# Patient Record
Sex: FEMALE | Race: BLACK OR AFRICAN AMERICAN | NOT HISPANIC OR LATINO | Employment: OTHER | URBAN - METROPOLITAN AREA
[De-identification: names, ages, dates, MRNs, and addresses within clinical notes are randomized per-mention and may not be internally consistent; named-entity substitution may affect disease eponyms.]

---

## 2019-02-19 ENCOUNTER — TELEPHONE (OUTPATIENT)
Dept: ONCOLOGY | Facility: CLINIC | Age: 64
End: 2019-02-19

## 2019-02-19 NOTE — TELEPHONE ENCOUNTER
ONCOLOGY INTAKE: Records Information      APPT INFORMATION: 3/11/19 at 11:15AM  Referring provider:  Dr. Maria Fernanda Eckert  Referring provider s clinic:  [facility]  Reason for visit/diagnosis:  Vaginal Bleeding, VIN3    Were the records received with the referral (via [employer])? No    Has patient been seen for any external appt for this diagnosis (enter clinic/location)? Pt was a previous patient of Dr. Abdalla's back in 2015 when she was initially diagnosed. Her surgery was done at [facility]. Pt has only been seen at [facility] for continuation of her gyn care post op. Please confirm any pertinent outside imaging done between 2015 - present.

## 2019-04-11 NOTE — PROGRESS NOTES
Consult Notes on Referred Patient        Dr. Maria Fernanda Eckert  [facility]  [address]  [place] [address]       RE: Alis Hartman  : 1955  SHERMAN: 2019    Dear Dr. Maria Fernanda Eckert:    I had the pleasure of seeing your patient Alis Hartman here at the Gynecologic Cancer Clinic at the [facility] on 2019.  As you know she is a very pleasant 63 year old woman with a recent diagnosis of HSIL on pap in the setting of a history of cervical cancer.  Given these findings she was subsequently sent to the Gynecologic Cancer Clinic for new patient consultation.  She is unaccompanied today.    She reports she has a history of cervical cancer sometime in the , however she is unsure exactly when (biopsy showing 1996 in Care Everywhere).  At that time she did not have a hysterectomy but was treated with primary radiation therapy, unsure if she also had chemotherapy or not.  [place] [facility] [place] records are unavailable.  Since that time she has intermittently followed with gynecologic oncology for paps and exams and has had a history of VAIN treated with CO2 laser in .  Recently she has had some vaginal bleeding and had another high grade pap smear in .  She also notes she has had persistent urinary symptoms since her radiation treatment and has had problems with recurrent UTI.  She feels she may have a UTI today and would like to be tested.    3/29/96:  Cervix biopsy:  Moderately differentiated squamous cell carcinoma-Dr Javi Morrow performed the biopsy    10/26/11:  EUA colposcopy and biopsy   Pathology: Cervix biopsy:  No dysplasia.  Uterus left apex:  Stroma hyalinization c/w irradiation.  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'clock    18:  Pap:  HSIL, HPV+      Review of Systems:  Systemic            no weight changes; no fever; no chills; no night sweats; no appetite changes  Skin           no rashes, or lesions  Eye           no irritation; no changes in vision  Jamir-Laryngeal           no dysphagia; no hoarseness   Pulmonary    no cough; no shortness of breath  Cardiovascular    no chest pain; no palpitations  Gastrointestinal    no diarrhea; no constipation; no abdominal pain; no changes in bowel  habits; no blood in stool  Genitourinary   + urinary frequency; + urinary urgency; no dysuria; no pain; no abnormal vaginal discharge; no abnormal vaginal bleeding  Breast    no breast discharge; no breast changes; no breast pain  Musculoskeletal    no myalgias; no arthralgias; no back pain; + joint pain  Psychiatric           no depressed mood; no anxiety    Hematologic            no tender lymph nodes; no noticeable swellings or lumps   Endocrine    no hot flashes; no heat/cold intolerance         Neurological   no tremor; no numbness and tingling; no headaches; no difficulty sleeping    Obstetrics and Gynecology History:  ,  x 2  No HRT      Past Medical History:  Past Medical History:   Diagnosis Date     Depression      Malignant neoplasm of endocervix (H)     Tx with radiation     Urinary incontinence        Past Surgical History:  Past Surgical History:   Procedure Laterality Date     ABDOMINOPLASTY       BIOPSY CERVICAL, LOCAL EXCISION, SINGLE/MULTIPLE  10/26/2011    Procedure:BIOPSY CERVICAL, LOCAL EXCISION, SINGLE/MULTIPLE; EUA, cervical biopsies; Surgeon:BETTY TINEO; Location: OR     GI SURGERY      gastric bypass     LASER CO2 VAGINA N/A 3/2/2015    Procedure: LASER CO2 VAGINA;  Surgeon: Mariela Abdalla MD;  Location:  OR       Health Maintenance:  Last Pap Smear: See [facility] due to history of cervical cancer    Last Mammogram: 19             Result: normal      She has not had a history of abnormal mammograms.    Last Colonoscopy: Never      Current Medications:   has a  current medication list which includes the following prescription(s): multi-vitamin, nystatin, and sertraline.     Allergies:   Shrimp and Sulfa drugs      Social History:  Social History     Socioeconomic History     Marital status: Single     Spouse name: Not on file     Number of children: Not on file     Years of education: Not on file     Highest education level: Not on file   Occupational History     Not on file   Social Needs     Financial resource strain: Not on file     Food insecurity:     Worry: Not on file     Inability: Not on file     Transportation needs:     Medical: Not on file     Non-medical: Not on file   Tobacco Use     Smoking status: Never Smoker     Smokeless tobacco: Never Used   Substance and Sexual Activity     Alcohol use: No     Drug use: No     Sexual activity: Not on file   Lifestyle     Physical activity:     Days per week: Not on file     Minutes per session: Not on file     Stress: Not on file   Relationships     Social connections:     Talks on phone: Not on file     Gets together: Not on file     Attends Religion service: Not on file     Active member of club or organization: Not on file     Attends meetings of clubs or organizations: Not on file     Relationship status: Not on file     Intimate partner violence:     Fear of current or ex partner: Not on file     Emotionally abused: Not on file     Physically abused: Not on file     Forced sexual activity: Not on file   Other Topics Concern     Parent/sibling w/ CABG, MI or angioplasty before 65F 55M? Not Asked   Social History Narrative     Not on file       Lives alone, feels safe at home.  On disability for mental illness.  Enjoys spending time with grandchildren.  Does not have an advanced directive on file and would like her son, Hemanth and sister Rebecca Landeros to be her POA.  Full code.    Family History:   The patient's family history is significant for.  Family History   Problem Relation Age of Onset     Heart Disease  "Father      Hypertension Sister          Physical Exam:   /84 (BP Location: Right arm)   Pulse 65   Temp 98.1  F (36.7  C) (Oral)   Resp 18   Ht 1.6 m (5' 2.99\")   Wt 95 kg (209 lb 8 oz)   SpO2 100%   BMI 37.12 kg/m    Body mass index is 37.12 kg/m .    General Appearance: healthy and alert, no distress     HEENT:  no thyromegaly, no palpable nodules or masses        Cardiovascular: regular rate and rhythm, no gallops, rubs or murmurs     Respiratory: lungs clear, no rales, rhonchi or wheezes, normal diaphragmatic excursion    Musculoskeletal: extremities non tender and without edema    Skin: no lesions or rashes     Neurological: normal gait, no gross defects     Psychiatric: appropriate mood and affect                               Hematological: normal cervical, supraclavicular and inguinal lymph nodes     Gastrointestinal:       abdomen soft, non-tender, non-distended, no organomegaly or masses    Genitourinary: External genitalia and urethral meatus appears normal.  Vagina is smooth without nodularity or masses.  Cervix is unable to be distinguished from the vagina due to radiation changes.  Bimanual exam reveal no masses, nodularity or fullness.  Recto-vaginal exam confirms these findings.    Procedure Note:  Consent was obtained.  Speculum was placed and the vagina/cervix was coated with acetic acid.  There were areas of punctation and vascularity at the right and left vaginal apices.  A biopsy was taken with the Tischler forceps from both sides of the vaginal apex.  Hemostasis was achieved with application of pressure.  Patient tolerated the procedure well.      Assessment:    Alis Hartman is a 63 year old woman with a new diagnosis of HSIL on pap in the setting of a history of cervical cancer.     A total of 60 minutes was spent with the patient, >50% of which were spent in counseling the patient and/or treatment planning, this was separate from the 5 minutes spent on post-operative " cares.      Plan:     1.)    HSIL in the setting of a history of cervical cancer and vaginal dysplasia.  We reviewed her history in detail.  We discussed her colposcopic impression which was high grade dysplasia.  Biopsies were taken today and I will call her with these results.  If high grade dysplasia is discovered, I will recommend EUA with CO2 laser of the dysplastic areas.     2.) Genetic risk factors were assessed and the patient does not meet the qualifications for a referral.      3.) Labs and/or tests ordered include:  Vaginal biopsies.     4.) Health maintenance issues addressed today include colonoscopy which will be addressed following treatment for her HSIL.          Thank you for allowing us to participate in the care of your patient.         Sincerely,    Lilliam Webb MD  Gynecologic Oncology  [facility]       OPAL JOHNSON

## 2019-04-12 ENCOUNTER — ONCOLOGY VISIT (OUTPATIENT)
Dept: ONCOLOGY | Facility: CLINIC | Age: 64
End: 2019-04-12
Payer: COMMERCIAL

## 2019-04-12 VITALS
TEMPERATURE: 98.1 F | DIASTOLIC BLOOD PRESSURE: 84 MMHG | SYSTOLIC BLOOD PRESSURE: 136 MMHG | RESPIRATION RATE: 18 BRPM | BODY MASS INDEX: 37.12 KG/M2 | HEART RATE: 65 BPM | OXYGEN SATURATION: 100 % | WEIGHT: 209.5 LBS | HEIGHT: 63 IN

## 2019-04-12 DIAGNOSIS — R30.0 DYSURIA: ICD-10-CM

## 2019-04-12 DIAGNOSIS — C53.9 MALIGNANT NEOPLASM OF CERVIX, UNSPECIFIED SITE (H): Primary | ICD-10-CM

## 2019-04-12 DIAGNOSIS — E66.9 OBESITY: ICD-10-CM

## 2019-04-12 LAB
ALBUMIN UR-MCNC: 10 MG/DL
APPEARANCE UR: ABNORMAL
BILIRUB UR QL STRIP: NEGATIVE
COLOR UR AUTO: ABNORMAL
GLUCOSE UR STRIP-MCNC: NEGATIVE MG/DL
HGB UR QL STRIP: ABNORMAL
KETONES UR STRIP-MCNC: NEGATIVE MG/DL
LEUKOCYTE ESTERASE UR QL STRIP: ABNORMAL
NITRATE UR QL: NEGATIVE
NON-SQ EPI CELLS #/AREA URNS LPF: ABNORMAL /LPF
PH UR STRIP: 5.5 PH (ref 5–7)
RBC #/AREA URNS AUTO: ABNORMAL /HPF
SOURCE: ABNORMAL
SP GR UR STRIP: 1.02 (ref 1–1.03)
UROBILINOGEN UR STRIP-MCNC: NORMAL MG/DL (ref 0–2)
WBC #/AREA URNS AUTO: ABNORMAL /HPF

## 2019-04-12 PROCEDURE — 57421 EXAM/BIOPSY OF VAG W/SCOPE: CPT | Performed by: OBSTETRICS & GYNECOLOGY

## 2019-04-12 PROCEDURE — 81001 URINALYSIS AUTO W/SCOPE: CPT | Performed by: OBSTETRICS & GYNECOLOGY

## 2019-04-12 PROCEDURE — 99207 ZZC NO CHARGE LOS: CPT | Performed by: DIETITIAN, REGISTERED

## 2019-04-12 PROCEDURE — 99205 OFFICE O/P NEW HI 60 MIN: CPT | Mod: 25 | Performed by: OBSTETRICS & GYNECOLOGY

## 2019-04-12 ASSESSMENT — PAIN SCALES - GENERAL: PAINLEVEL: EXTREME PAIN (8)

## 2019-04-12 ASSESSMENT — MIFFLIN-ST. JEOR: SCORE: 1464.29

## 2019-04-12 NOTE — LETTER
2019         RE: Alis Hartman  [address]  [place] [address]        Dear Colleague,    Thank you for referring your patient, Alis Hartman, to the [facility]. Please see a copy of my visit note below.    Consult Notes on Referred Patient        Dr. Maria Fernanda Eckert  [facility]  [address]  [place] [address]       RE: Alis Hartman  : 1955  SHERMAN: 2019    Dear Dr. Maria Fernanda Eckert:    I had the pleasure of seeing your patient Alis Hartman here at the Gynecologic Cancer Clinic at the [facility] on 2019.  As you know she is a very pleasant 63 year old woman with a recent diagnosis of HSIL on pap in the setting of a history of cervical cancer.  Given these findings she was subsequently sent to the Gynecologic Cancer Clinic for new patient consultation.  She is unaccompanied today.    She reports she has a history of cervical cancer sometime in the , however she is unsure exactly when (biopsy showing 1996 in Care Everywhere).  At that time she did not have a hysterectomy but was treated with primary radiation therapy, unsure if she also had chemotherapy or not.  This treatment was at [facility] and these records are unavailable.  Since that time she has intermittently followed with gynecologic oncology for paps and exams and has had a history of VAIN treated with CO2 laser in .  Recently she has had some vaginal bleeding and had another high grade pap smear in .  She also notes she has had persistent urinary symptoms since her radiation treatment and has had problems with recurrent UTI.  She feels she may have a UTI today and would like to be tested.    3/29/96:  Cervix biopsy:  Moderately differentiated squamous cell carcinoma-Dr Javi Morrow performed the biopsy    10/26/11:  EUA colposcopy and biopsy   Pathology: Cervix biopsy:  No dysplasia.  Uterus left apex:  Stroma hyalinization c/w  irradiation.  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'clock    18:  Pap:  HSIL, HPV+      Review of Systems:  Systemic           no weight changes; no fever; no chills; no night sweats; no appetite changes  Skin           no rashes, or lesions  Eye           no irritation; no changes in vision  Jamir-Laryngeal           no dysphagia; no hoarseness   Pulmonary    no cough; no shortness of breath  Cardiovascular    no chest pain; no palpitations  Gastrointestinal    no diarrhea; no constipation; no abdominal pain; no changes in bowel  habits; no blood in stool  Genitourinary   + urinary frequency; + urinary urgency; no dysuria; no pain; no abnormal vaginal discharge; no abnormal vaginal bleeding  Breast    no breast discharge; no breast changes; no breast pain  Musculoskeletal    no myalgias; no arthralgias; no back pain; + joint pain  Psychiatric           no depressed mood; no anxiety    Hematologic            no tender lymph nodes; no noticeable swellings or lumps   Endocrine    no hot flashes; no heat/cold intolerance         Neurological   no tremor; no numbness and tingling; no headaches; no difficulty sleeping    Obstetrics and Gynecology History:  ,  x 2  No HRT      Past Medical History:  Past Medical History:   Diagnosis Date     Depression      Malignant neoplasm of endocervix (H)     Tx with radiation     Urinary incontinence        Past Surgical History:  Past Surgical History:   Procedure Laterality Date     ABDOMINOPLASTY       BIOPSY CERVICAL, LOCAL EXCISION, SINGLE/MULTIPLE  10/26/2011    Procedure:BIOPSY CERVICAL, LOCAL EXCISION, SINGLE/MULTIPLE; EUA, cervical biopsies; Surgeon:BETTY TINEO; Location:[facility] OR     GI SURGERY      gastric bypass     LASER CO2 VAGINA N/A 3/2/2015    Procedure: LASER CO2 VAGINA;  Surgeon: Mariela Abdalla MD;  Location:  [facility] OR       Health Maintenance:  Last Pap Smear: See HPI due to history of cervical cancer    Last Mammogram: 1/23/19             Result: normal      She has not had a history of abnormal mammograms.    Last Colonoscopy: Never      Current Medications:   has a current medication list which includes the following prescription(s): multi-vitamin, nystatin, and sertraline.     Allergies:   Shrimp and Sulfa drugs      Social History:  Social History     Socioeconomic History     Marital status: Single     Spouse name: Not on file     Number of children: Not on file     Years of education: Not on file     Highest education level: Not on file   Occupational History     Not on file   Social Needs     Financial resource strain: Not on file     Food insecurity:     Worry: Not on file     Inability: Not on file     Transportation needs:     Medical: Not on file     Non-medical: Not on file   Tobacco Use     Smoking status: Never Smoker     Smokeless tobacco: Never Used   Substance and Sexual Activity     Alcohol use: No     Drug use: No     Sexual activity: Not on file   Lifestyle     Physical activity:     Days per week: Not on file     Minutes per session: Not on file     Stress: Not on file   Relationships     Social connections:     Talks on phone: Not on file     Gets together: Not on file     Attends Anabaptism service: Not on file     Active member of club or organization: Not on file     Attends meetings of clubs or organizations: Not on file     Relationship status: Not on file     Intimate partner violence:     Fear of current or ex partner: Not on file     Emotionally abused: Not on file     Physically abused: Not on file     Forced sexual activity: Not on file   Other Topics Concern     Parent/sibling w/ CABG, MI or angioplasty before 65F 55M? Not Asked   Social History Narrative     Not on file       Lives alone, feels safe at home.  On disability for mental illness.  Enjoys spending time with grandchildren.  Does  "not have an advanced directive on file and would like her son, Hemanth and sister Rebecca Landeros to be her POA.  Full code.    Family History:   The patient's family history is significant for.  Family History   Problem Relation Age of Onset     Heart Disease Father      Hypertension Sister          Physical Exam:   /84 (BP Location: Right arm)   Pulse 65   Temp 98.1  F (36.7  C) (Oral)   Resp 18   Ht 1.6 m (5' 2.99\")   Wt 95 kg (209 lb 8 oz)   SpO2 100%   BMI 37.12 kg/m     Body mass index is 37.12 kg/m .    General Appearance: healthy and alert, no distress     HEENT:  no thyromegaly, no palpable nodules or masses        Cardiovascular: regular rate and rhythm, no gallops, rubs or murmurs     Respiratory: lungs clear, no rales, rhonchi or wheezes, normal diaphragmatic excursion    Musculoskeletal: extremities non tender and without edema    Skin: no lesions or rashes     Neurological: normal gait, no gross defects     Psychiatric: appropriate mood and affect                               Hematological: normal cervical, supraclavicular and inguinal lymph nodes     Gastrointestinal:       abdomen soft, non-tender, non-distended, no organomegaly or masses    Genitourinary: External genitalia and urethral meatus appears normal.  Vagina is smooth without nodularity or masses.  Cervix is unable to be distinguished from the vagina due to radiation changes.  Bimanual exam reveal no masses, nodularity or fullness.  Recto-vaginal exam confirms these findings.    Procedure Note:  Consent was obtained.  Speculum was placed and the vagina/cervix was coated with acetic acid.  There were areas of punctation and vascularity at the right and left vaginal apices.  A biopsy was taken with the Tischler forceps from both sides of the vaginal apex.  Hemostasis was achieved with application of pressure.  Patient tolerated the procedure well.      Assessment:    Alis Hartman is a 63 year old woman with a new diagnosis of " HSIL on pap in the setting of a history of cervical cancer.     A total of 60 minutes was spent with the patient, >50% of which were spent in counseling the patient and/or treatment planning, this was separate from the 5 minutes spent on post-operative cares.      Plan:     1.)    HSIL in the setting of a history of cervical cancer and vaginal dysplasia.  We reviewed her history in detail.  We discussed her colposcopic impression which was high grade dysplasia.  Biopsies were taken today and I will call her with these results.  If high grade dysplasia is discovered, I will recommend EUA with CO2 laser of the dysplastic areas.     2.) Genetic risk factors were assessed and the patient does not meet the qualifications for a referral.      3.) Labs and/or tests ordered include:  Vaginal biopsies.     4.) Health maintenance issues addressed today include colonoscopy which will be addressed following treatment for her HSIL.          Thank you for allowing us to participate in the care of your patient.         Sincerely,    Lilliam Webb MD  Gynecologic Oncology  [facility]       OPAL JOHNSON

## 2019-04-12 NOTE — LETTER
"    4/12/2019         RE: Alis Hartman  [address]  [place] [address]        Dear Colleague,    Thank you for referring your patient, Alis Hartman, to the [facility]. Please see a copy of my visit note below.    Nutrition Services:    Met with patient today per her request from oncology distress screening.   She has a history of cervical cancer, here to see Dr. Webb today.   She had RNYGB ~ 20 years ago and is concerned about ongoing weight gain.    She has a good nutrition knowledge base but admits to grazing frequently on unhealthy foods such as chips, popcorn and candy bars.  She also admits to making unhealthy meal choices such as fried foods, fast food burgers etc.  She tends to eat chips out of the bag, unable to determine calories.    She admits to occasionally trying sweets such as ice cream then has dumping.     Wt Readings from Last 8 Encounters:   04/12/19 95 kg (209 lb 8 oz)   03/09/15 90.3 kg (199 lb)   01/26/15 88.5 kg (195 lb)   10/13/14 92.2 kg (203 lb 4.8 oz)   04/09/14 89.4 kg (197 lb 3.2 oz)   02/03/14 87.3 kg (192 lb 6.4 oz)   09/23/13 84.8 kg (186 lb 14.4 oz)   08/12/13 83.5 kg (184 lb)     She has gained ~9 lb x past 4 years and a total of 25 lbs in the past 6 years.    She expresses that this has been causing more joint pain, inhibiting her ability to adequately exercise.      Anthropometrics:   Height: 62\"  Weight: 290 lbs/95kg (190% of ideal wt)  IBW: 110 lbs   Adjusted wt: 134 lb/61kg    Nutrition needs:   Calorie needs: 1200-1400kcal/day (20-25kcal/kg)  Protein needs: 75g (1.2g/kg)      Interventions:  Discussed dietary and behavioral modifications to promote weight loss (portion control, meal consistency, measuring foods, protein sources, eating pace, exercise and avoidance of disordered eating patterns (grazing).   Discussed calorie needs for weight loss.  Encouraged pt to limit calories to <1400/day.  Suggested she start tracking her intake " for accountability.  Suggested she start measuring her foods to better gauge portion size for portion control.        Provided pt with corresponding education materials/handouts on:  1400kcal meal plan   Sources of Protein    Pt expressed appreciation of visit today upon immediate request.  She expresses her desire and motivation to make changes in her diet and eating habits to facilitate weight loss.      RD advised pt to call upon further nutrition questions/concerns.     Ella Londono RDN, LDN            Again, thank you for allowing me to participate in the care of your patient.        Sincerely,        Ella Londono RD

## 2019-04-12 NOTE — PROGRESS NOTES
"Nutrition Services:    Met with patient today per her request from oncology distress screening.   She has a history of cervical cancer, here to see Dr. Webb today.   She had RNYGB ~ 20 years ago and is concerned about ongoing weight gain.    She has a good nutrition knowledge base but admits to grazing frequently on unhealthy foods such as chips, popcorn and candy bars.  She also admits to making unhealthy meal choices such as fried foods, fast food burgers etc.  She tends to eat chips out of the bag, unable to determine calories.    She admits to occasionally trying sweets such as ice cream then has dumping.     Wt Readings from Last 8 Encounters:   04/12/19 95 kg (209 lb 8 oz)   03/09/15 90.3 kg (199 lb)   01/26/15 88.5 kg (195 lb)   10/13/14 92.2 kg (203 lb 4.8 oz)   04/09/14 89.4 kg (197 lb 3.2 oz)   02/03/14 87.3 kg (192 lb 6.4 oz)   09/23/13 84.8 kg (186 lb 14.4 oz)   08/12/13 83.5 kg (184 lb)     She has gained ~9 lb x past 4 years and a total of 25 lbs in the past 6 years.    She expresses that this has been causing more joint pain, inhibiting her ability to adequately exercise.      Anthropometrics:   Height: 62\"  Weight: 290 lbs/95kg (190% of ideal wt)  IBW: 110 lbs   Adjusted wt: 134 lb/61kg    Nutrition needs:   Calorie needs: 1200-1400kcal/day (20-25kcal/kg)  Protein needs: 75g (1.2g/kg)      Interventions:  Discussed dietary and behavioral modifications to promote weight loss (portion control, meal consistency, measuring foods, protein sources, eating pace, exercise and avoidance of disordered eating patterns (grazing).   Discussed calorie needs for weight loss.  Encouraged pt to limit calories to <1400/day.  Suggested she start tracking her intake for accountability.  Suggested she start measuring her foods to better gauge portion size for portion control.        Provided pt with corresponding education materials/handouts on:  1400kcal meal plan   Sources of Protein    Pt expressed appreciation of " visit today upon immediate request.  She expresses her desire and motivation to make changes in her diet and eating habits to facilitate weight loss.      RD advised pt to call upon further nutrition questions/concerns.     Ella Londono RDN, LDN

## 2019-04-12 NOTE — NURSING NOTE
"Oncology Rooming Note    April 12, 2019 9:01 AM   Alis Hartman is a 63 year old female who presents for:    Chief Complaint   Patient presents with     Oncology Clinic Visit     New Patient     Initial Vitals: /84 (BP Location: Right arm)   Pulse 65   Temp 98.1  F (36.7  C) (Oral)   Resp 18   Ht 1.6 m (5' 2.99\")   Wt 95 kg (209 lb 8 oz)   SpO2 100%   BMI 37.12 kg/m   Estimated body mass index is 37.12 kg/m  as calculated from the following:    Height as of this encounter: 1.6 m (5' 2.99\").    Weight as of this encounter: 95 kg (209 lb 8 oz). Body surface area is 2.05 meters squared.  Extreme Pain (8) Comment: Data Unavailable   No LMP recorded. Patient is postmenopausal.  Allergies reviewed: Yes  Medications reviewed: Yes    Medications: Medication refills not needed today.  Pharmacy name entered into Virtual DBS:    [id number] - [place], [place] - [address]  [id number] - [place], [place] - [address]  [id number] - [place] - [address]    Virginia Stern LPN              "

## 2019-04-16 LAB — COPATH REPORT: NORMAL

## 2019-04-30 ENCOUNTER — PATIENT OUTREACH (OUTPATIENT)
Dept: ONCOLOGY | Facility: CLINIC | Age: 64
End: 2019-04-30

## 2019-04-30 DIAGNOSIS — D07.2 VAIN III (VAGINAL INTRAEPITHELIAL NEOPLASIA III): Primary | ICD-10-CM

## 2019-04-30 RX ORDER — HEPARIN SODIUM 5000 [USP'U]/.5ML
5000 INJECTION, SOLUTION INTRAVENOUS; SUBCUTANEOUS
Status: CANCELLED | OUTPATIENT
Start: 2019-04-30

## 2019-04-30 NOTE — PROGRESS NOTES
[facility]:  Care Coordination Note    Received update from Dr. Webb regarding biopsy results from 4/12: based on final results, patient will need to have CO2 laser of this area in the OR.  Dr. Webb has called patient directly with the biopsy results, and recommendations.    Telephone call was placed to patient to discuss a possible date for the procedure.  Patient states she would like to have the procedure done with Dr. Webb in [place], on Friday 5/24/19 if possible.  Dr. Webb was updated.      Surgery packet will be mailed to patient.  Writer will plan to follow-up with patient via telephone in the next couple of weeks, to ensure she received the packet, and to review pre-op information.  Encouraged patient to call with any questions/concerns in the meantime.     Nicholas Jarrell, RN, BSN, OCN  Oncology Care Coordinator  [facility]

## 2019-05-01 ENCOUNTER — TELEPHONE (OUTPATIENT)
Dept: ONCOLOGY | Facility: CLINIC | Age: 64
End: 2019-05-01

## 2019-05-16 ENCOUNTER — TRANSFERRED RECORDS (OUTPATIENT)
Dept: HEALTH INFORMATION MANAGEMENT | Facility: CLINIC | Age: 64
End: 2019-05-16

## 2019-05-16 LAB
CREAT SERPL-MCNC: 0.64 MG/DL (ref 0.5–1)
GFR SERPL CREATININE-BSD FRML MDRD: 94 ML/MIN/1.73M2
GLUCOSE SERPL-MCNC: 92 MG/DL (ref 70–100)
POTASSIUM SERPL-SCNC: 4.3 MEQ/L (ref 3.5–5.3)

## 2019-05-17 ENCOUNTER — PATIENT OUTREACH (OUTPATIENT)
Dept: ONCOLOGY | Facility: CLINIC | Age: 64
End: 2019-05-17

## 2019-05-17 ENCOUNTER — TRANSFERRED RECORDS (OUTPATIENT)
Dept: HEALTH INFORMATION MANAGEMENT | Facility: CLINIC | Age: 64
End: 2019-05-17

## 2019-05-17 NOTE — PROGRESS NOTES
Patient called into clinic, verifying that she received the surgery packet in the mail and has reviewed the information, for upcoming surgical procedure with Dr. Webb on 5/24/19.  No questions or concerns at this time.  Patient reported she was able to have her pre-op appointment with her PCP yesterday, and will be having the EKG today.     Nicholas Jarrell RN, BSN, OCN  Oncology Care Coordinator  [facility]

## 2019-05-23 ENCOUNTER — ANESTHESIA EVENT (OUTPATIENT)
Dept: SURGERY | Facility: AMBULATORY SURGERY CENTER | Age: 64
End: 2019-05-23

## 2019-05-23 RX ORDER — HYDROCHLOROTHIAZIDE 25 MG/1
25 TABLET ORAL
Status: ON HOLD | COMMUNITY
Start: 2019-03-29 | End: 2024-07-16

## 2019-05-23 RX ORDER — LIDOCAINE/PRILOCAINE 2.5 %-2.5%
CREAM (GRAM) TOPICAL
COMMUNITY
Start: 2019-05-16 | End: 2024-05-15

## 2019-05-23 RX ORDER — ACETAMINOPHEN 500 MG
1000 TABLET ORAL
Status: ON HOLD | COMMUNITY
Start: 2017-09-27 | End: 2024-05-17

## 2019-05-23 RX ORDER — OXYCODONE HYDROCHLORIDE 10 MG/1
TABLET ORAL
Refills: 0 | COMMUNITY
Start: 2019-04-09 | End: 2020-02-28

## 2019-05-23 RX ORDER — NALOXONE HYDROCHLORIDE 4 MG/.1ML
SPRAY NASAL
Refills: 0 | COMMUNITY
Start: 2018-08-28 | End: 2024-07-21

## 2019-05-23 RX ORDER — OXYCODONE AND ACETAMINOPHEN TABLETS 10; 300 MG/1; MG/1
1 TABLET ORAL
COMMUNITY
End: 2020-02-28

## 2019-05-23 RX ORDER — ZOLPIDEM TARTRATE 10 MG/1
10 TABLET ORAL
COMMUNITY
Start: 2019-03-13

## 2019-05-23 RX ORDER — CYANOCOBALAMIN 1000 UG/ML
1000 INJECTION, SOLUTION INTRAMUSCULAR; SUBCUTANEOUS
COMMUNITY
Start: 2018-01-08 | End: 2019-12-31

## 2019-05-23 RX ORDER — METRONIDAZOLE 7.5 MG/G
GEL VAGINAL
Refills: 0 | COMMUNITY
Start: 2019-02-13 | End: 2020-02-28

## 2019-05-23 RX ORDER — PHENAZOPYRIDINE HYDROCHLORIDE 100 MG/1
TABLET, FILM COATED ORAL
Refills: 2 | COMMUNITY
Start: 2019-04-09 | End: 2020-02-28

## 2019-05-24 ENCOUNTER — SURGERY (OUTPATIENT)
Age: 64
End: 2019-05-24
Payer: MEDICARE

## 2019-05-24 ENCOUNTER — ANESTHESIA (OUTPATIENT)
Dept: SURGERY | Facility: AMBULATORY SURGERY CENTER | Age: 64
End: 2019-05-24
Payer: MEDICARE

## 2019-05-24 ENCOUNTER — HOSPITAL ENCOUNTER (OUTPATIENT)
Facility: AMBULATORY SURGERY CENTER | Age: 64
Discharge: HOME | End: 2019-05-24
Attending: OBSTETRICS & GYNECOLOGY | Admitting: OBSTETRICS & GYNECOLOGY
Payer: COMMERCIAL

## 2019-05-24 VITALS
DIASTOLIC BLOOD PRESSURE: 73 MMHG | TEMPERATURE: 97.1 F | OXYGEN SATURATION: 100 % | RESPIRATION RATE: 16 BRPM | SYSTOLIC BLOOD PRESSURE: 135 MMHG

## 2019-05-24 PROCEDURE — 57421 EXAM/BIOPSY OF VAG W/SCOPE: CPT | Performed by: OBSTETRICS & GYNECOLOGY

## 2019-05-24 PROCEDURE — 57421 EXAM/BIOPSY OF VAG W/SCOPE: CPT

## 2019-05-24 PROCEDURE — 57065 DESTRUCTION VAG LESION XTNSV: CPT

## 2019-05-24 PROCEDURE — G8918 PT W/O PREOP ORDER IV AB PRO: HCPCS

## 2019-05-24 PROCEDURE — G8907 PT DOC NO EVENTS ON DISCHARG: HCPCS

## 2019-05-24 PROCEDURE — 88305 TISSUE EXAM BY PATHOLOGIST: CPT | Performed by: OBSTETRICS & GYNECOLOGY

## 2019-05-24 RX ORDER — PROPOFOL 10 MG/ML
INJECTION, EMULSION INTRAVENOUS CONTINUOUS PRN
Status: DISCONTINUED | OUTPATIENT
Start: 2019-05-24 | End: 2019-05-24

## 2019-05-24 RX ORDER — LIDOCAINE 40 MG/G
CREAM TOPICAL
Status: DISCONTINUED | OUTPATIENT
Start: 2019-05-24 | End: 2019-05-25 | Stop reason: HOSPADM

## 2019-05-24 RX ORDER — ALBUTEROL SULFATE 0.83 MG/ML
2.5 SOLUTION RESPIRATORY (INHALATION) EVERY 4 HOURS PRN
Status: DISCONTINUED | OUTPATIENT
Start: 2019-05-24 | End: 2019-05-25 | Stop reason: HOSPADM

## 2019-05-24 RX ORDER — NALOXONE HYDROCHLORIDE 0.4 MG/ML
.1-.4 INJECTION, SOLUTION INTRAMUSCULAR; INTRAVENOUS; SUBCUTANEOUS
Status: DISCONTINUED | OUTPATIENT
Start: 2019-05-24 | End: 2019-05-25 | Stop reason: HOSPADM

## 2019-05-24 RX ORDER — SODIUM CHLORIDE, SODIUM LACTATE, POTASSIUM CHLORIDE, CALCIUM CHLORIDE 600; 310; 30; 20 MG/100ML; MG/100ML; MG/100ML; MG/100ML
INJECTION, SOLUTION INTRAVENOUS CONTINUOUS
Status: DISCONTINUED | OUTPATIENT
Start: 2019-05-24 | End: 2019-05-25 | Stop reason: HOSPADM

## 2019-05-24 RX ORDER — ACETAMINOPHEN 325 MG/1
975 TABLET ORAL ONCE
Status: COMPLETED | OUTPATIENT
Start: 2019-05-24 | End: 2019-05-24

## 2019-05-24 RX ORDER — ONDANSETRON 2 MG/ML
INJECTION INTRAMUSCULAR; INTRAVENOUS PRN
Status: DISCONTINUED | OUTPATIENT
Start: 2019-05-24 | End: 2019-05-24

## 2019-05-24 RX ORDER — PROPOFOL 10 MG/ML
INJECTION, EMULSION INTRAVENOUS PRN
Status: DISCONTINUED | OUTPATIENT
Start: 2019-05-24 | End: 2019-05-24

## 2019-05-24 RX ORDER — ONDANSETRON 4 MG/1
4 TABLET, ORALLY DISINTEGRATING ORAL EVERY 30 MIN PRN
Status: DISCONTINUED | OUTPATIENT
Start: 2019-05-24 | End: 2019-05-25 | Stop reason: HOSPADM

## 2019-05-24 RX ORDER — FENTANYL CITRATE 50 UG/ML
25-50 INJECTION, SOLUTION INTRAMUSCULAR; INTRAVENOUS
Status: DISCONTINUED | OUTPATIENT
Start: 2019-05-24 | End: 2019-05-25 | Stop reason: HOSPADM

## 2019-05-24 RX ORDER — FENTANYL CITRATE 50 UG/ML
INJECTION, SOLUTION INTRAMUSCULAR; INTRAVENOUS PRN
Status: DISCONTINUED | OUTPATIENT
Start: 2019-05-24 | End: 2019-05-24

## 2019-05-24 RX ORDER — MEPERIDINE HYDROCHLORIDE 25 MG/ML
12.5 INJECTION INTRAMUSCULAR; INTRAVENOUS; SUBCUTANEOUS
Status: DISCONTINUED | OUTPATIENT
Start: 2019-05-24 | End: 2019-05-25 | Stop reason: HOSPADM

## 2019-05-24 RX ORDER — ONDANSETRON 2 MG/ML
4 INJECTION INTRAMUSCULAR; INTRAVENOUS EVERY 30 MIN PRN
Status: DISCONTINUED | OUTPATIENT
Start: 2019-05-24 | End: 2019-05-25 | Stop reason: HOSPADM

## 2019-05-24 RX ORDER — HEPARIN SODIUM 5000 [USP'U]/.5ML
5000 INJECTION, SOLUTION INTRAVENOUS; SUBCUTANEOUS
Status: COMPLETED | OUTPATIENT
Start: 2019-05-24 | End: 2019-05-24

## 2019-05-24 RX ORDER — OXYCODONE HYDROCHLORIDE 5 MG/1
5-10 TABLET ORAL EVERY 4 HOURS PRN
Status: DISCONTINUED | OUTPATIENT
Start: 2019-05-24 | End: 2019-05-25 | Stop reason: HOSPADM

## 2019-05-24 RX ORDER — LIDOCAINE HYDROCHLORIDE 20 MG/ML
INJECTION, SOLUTION INFILTRATION; PERINEURAL PRN
Status: DISCONTINUED | OUTPATIENT
Start: 2019-05-24 | End: 2019-05-24

## 2019-05-24 RX ORDER — DEXAMETHASONE SODIUM PHOSPHATE 4 MG/ML
4 INJECTION, SOLUTION INTRA-ARTICULAR; INTRALESIONAL; INTRAMUSCULAR; INTRAVENOUS; SOFT TISSUE EVERY 10 MIN PRN
Status: DISCONTINUED | OUTPATIENT
Start: 2019-05-24 | End: 2019-05-25 | Stop reason: HOSPADM

## 2019-05-24 RX ORDER — HYDROMORPHONE HYDROCHLORIDE 1 MG/ML
.3-.5 INJECTION, SOLUTION INTRAMUSCULAR; INTRAVENOUS; SUBCUTANEOUS EVERY 10 MIN PRN
Status: DISCONTINUED | OUTPATIENT
Start: 2019-05-24 | End: 2019-05-25 | Stop reason: HOSPADM

## 2019-05-24 RX ORDER — DEXAMETHASONE SODIUM PHOSPHATE 4 MG/ML
INJECTION, SOLUTION INTRA-ARTICULAR; INTRALESIONAL; INTRAMUSCULAR; INTRAVENOUS; SOFT TISSUE PRN
Status: DISCONTINUED | OUTPATIENT
Start: 2019-05-24 | End: 2019-05-24

## 2019-05-24 RX ADMIN — LIDOCAINE HYDROCHLORIDE 60 MG: 20 INJECTION, SOLUTION INFILTRATION; PERINEURAL at 07:09

## 2019-05-24 RX ADMIN — DEXAMETHASONE SODIUM PHOSPHATE 4 MG: 4 INJECTION, SOLUTION INTRA-ARTICULAR; INTRALESIONAL; INTRAMUSCULAR; INTRAVENOUS; SOFT TISSUE at 07:12

## 2019-05-24 RX ADMIN — PROPOFOL 150 MCG/KG/MIN: 10 INJECTION, EMULSION INTRAVENOUS at 07:12

## 2019-05-24 RX ADMIN — FENTANYL CITRATE 25 MCG: 50 INJECTION, SOLUTION INTRAMUSCULAR; INTRAVENOUS at 07:09

## 2019-05-24 RX ADMIN — PROPOFOL 30 MG: 10 INJECTION, EMULSION INTRAVENOUS at 07:15

## 2019-05-24 RX ADMIN — SODIUM CHLORIDE, SODIUM LACTATE, POTASSIUM CHLORIDE, CALCIUM CHLORIDE: 600; 310; 30; 20 INJECTION, SOLUTION INTRAVENOUS at 06:38

## 2019-05-24 RX ADMIN — ONDANSETRON 4 MG: 2 INJECTION INTRAMUSCULAR; INTRAVENOUS at 07:19

## 2019-05-24 RX ADMIN — HEPARIN SODIUM 5000 UNITS: 5000 INJECTION, SOLUTION INTRAVENOUS; SUBCUTANEOUS at 07:05

## 2019-05-24 RX ADMIN — PROPOFOL 80 MG: 10 INJECTION, EMULSION INTRAVENOUS at 07:09

## 2019-05-24 RX ADMIN — PROPOFOL 20 MG: 10 INJECTION, EMULSION INTRAVENOUS at 07:12

## 2019-05-24 RX ADMIN — OXYCODONE HYDROCHLORIDE 10 MG: 5 TABLET ORAL at 07:38

## 2019-05-24 RX ADMIN — ACETAMINOPHEN 975 MG: 325 TABLET ORAL at 06:18

## 2019-05-24 NOTE — ANESTHESIA CARE TRANSFER NOTE
Patient: Alis Hartman    Procedure(s):  Exam under anesthesia, vaginal biopsies, CO2 laser of the vagina    Diagnosis: Vaginal dysplasia  Diagnosis Additional Information: No value filed.    Anesthesia Type:   MAC     Note:  Airway :Room Air  Patient transferred to:Phase II  Comments: Patient awake, alert, and oriented. SpO2 96%.  No apparent anesthesia complications. Handoff Report: Identifed the Patient, Identified the Reponsible Provider, Reviewed the pertinent medical history, Discussed the surgical course, Reviewed Intra-OP anesthesia mangement and issues during anesthesia, Set expectations for post-procedure period and Allowed opportunity for questions and acknowledgement of understanding      Vitals: (Last set prior to Anesthesia Care Transfer)    CRNA VITALS  5/24/2019 0659 - 5/24/2019 0733      5/24/2019             Pulse:  90    SpO2:  100 %                Electronically Signed By: ERINN Rios CRNA  May 24, 2019  7:33 AM

## 2019-05-24 NOTE — DISCHARGE INSTRUCTIONS
[facility]  Same-Day Surgery   Adult Discharge Orders & Instructions   For 24 hours after surgery  1. Get plenty of rest.  A responsible adult must stay with you for at least 24 hours after you leave the hospital.   2. Do not drive or use heavy equipment.  If you have weakness or tingling, don't drive or use heavy equipment until this feeling goes away.  3. Do not drink alcohol.  4. Avoid strenuous or risky activities.  Ask for help when climbing stairs.   5. You may feel lightheaded.  IF so, sit for a few minutes before standing.  Have someone help you get up.   6. If you have nausea (feel sick to your stomach): Drink only clear liquids such as apple juice, ginger ale, broth or 7-Up.  Rest may also help.  Be sure to drink enough fluids.  Move to a regular diet as you feel able.  7. You may have a slight fever. Call the doctor if your fever is over 100 F (37.7 C) (taken under the tongue) or lasts longer than 24 hours.  8. You may have a dry mouth, a sore throat, muscle aches or trouble sleeping.  These should go away after 24 hours.  9. Do not make important or legal decisions.   Call your doctor for any of the followin.  Signs of infection (fever, growing tenderness at the surgery site, a large amount of drainage or bleeding, severe pain, foul-smelling drainage, redness, swelling).    2. It has been over 8 to 10 hours since surgery and you are still not able to urinate (pass water).    3.  Headache for over 24 hours.    4.  Numbness, tingling or weakness the day after surgery (if you had spinal anesthesia).  To contact a doctor, call ___________________________

## 2019-05-24 NOTE — ANESTHESIA POSTPROCEDURE EVALUATION
Anesthesia POST Procedure Evaluation    Patient: Alis Hartman   MRN:     [id number] Gender:   female   Age:    63 year old :      1955        Preoperative Diagnosis: Vaginal dysplasia   Procedure(s):  Exam under anesthesia, vaginal biopsies, CO2 laser of the vagina   Postop Comments: No value filed.       Anesthesia Type:  MAC  MAC    Reportable Event: NO     PAIN: Uncomplicated   Sign Out status: Comfortable, Well controlled pain     PONV: No PONV   Sign Out status:  No Nausea or Vomiting     Neuro/Psych: Uneventful perioperative course   Sign Out Status: Preoperative baseline; Age appropriate mentation     Airway/Resp.: Uneventful perioperative course   Sign Out Status: Non labored breathing, age appropriate RR; Resp. Status within EXPECTED Parameters     CV: Uneventful perioperative course   Sign Out status: Appropriate BP and perfusion indices; Appropriate HR/Rhythm     Disposition:   Sign Out in:  Phase II  Recovery Course: Uneventful  Follow-Up: Not required           Last Anesthesia Record Vitals:  CRNA VITALS  2019 0659 - 2019 0759      2019             Pulse:  90    SpO2:  100 %          Last PACU Vitals:  Vitals Value Taken Time   /62 2019  7:30 AM   Temp 97.1  F (36.2  C) 2019  7:30 AM   Pulse     Resp 16 2019  7:30 AM   SpO2 100 % 2019  7:30 AM   Temp src Skin 2019  7:15 AM   NIBP 112/94 2019  7:27 AM   Pulse 90 2019  7:29 AM   SpO2 100 % 2019  7:29 AM   Resp     Temp 96.8  F (36  C) 2019  7:15 AM   Ht Rate     Temp 2           Electronically Signed By: Mian Salas MD, May 24, 2019, 8:22 AM

## 2019-05-24 NOTE — OP NOTE
Gynecologic Oncology Operative Report    5/24/2019  Alis Hartman  [id number]    PREOPERATIVE DIAGNOSIS: History of cervical cancer, VAIN3    POSTOPERATIVE DIAGNOSIS: Same, final pathology pending    PROCEDURES: Vaginal colposcopy, vaginal biopsy, CO2 laser of the vagina    SURGEON: Lilliam Webb MD     ANESTHESIA: MAC    ESTIMATED BLOOD LOSS: 10 cc     IV FLUIDS: See anesthesia record    URINE OUTPUT: not recorded     INDICATIONS: Alis Hartman is a 63 year old with a remote history of cervical cancer treated with radiation some time in the 1990's.  She has had intermittent follow up since that time and has had VAIN in the past for which she has been treated with CO2 laser, most recently in 2015.  She recently has had vaginal bleeding and a pap was obtained showing HSIL.  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  Following a thorough discussion of the risks, benefits, indications and alteratives she consented to the above procedure.    SPECIMENS:   Vaginal biopsy    COMPLICATIONS: None.     CONDITION: Stable to PACU.     FINDINGS/PROCEDURE:   Consent was reviewed with the patient in the preoperative setting and confirmed.  The patient was transferred to the operating room and placed in dorsal supine position. General anesthetic was obtained in the usual manner without noted difficulties. The patient was then positioned onto Aly stirrups and an exam under anesthesia was performed showing normal external genitalia and a shortened vagina with changes consistent with previous radiation therapy.  The patient was draped for the above-mentioned procedure.  Timeout was called at which point the patient's name, procedure and operative site was confirmed by the operative team. Initially, the instruments for the vaginal manipulator were positioned, a speculum was placed in the vagina. Lugol's was applied to the entire vagina.  There was a large  non-staining area encompassing the entire vaginal apex/cervix.  The cervix was unable to be distinguished from the vagina given the stenosis/radiation changes.  This non-staining area extended down the anterior vagina approximately 2 cm.  A biopsy from the center of the non-staining area was obtained and sent for pathology.  The entire non-staining area was then treated adequately with the CO2 laser.  The remainder of the vagina was again carefully inspected to the introitus without changes concerning for dysplasia/recurrence.  The patient tolerated the procedure well and was taken to the recovery room in stable condition.    Sponge, lap and needle counts were reported as correct x2 and instrument count was correct x1.     Lilliam Webb MD  Gynecologic Oncology  [facility]

## 2019-05-24 NOTE — ANESTHESIA PREPROCEDURE EVALUATION
Anesthesia Pre-Procedure Evaluation    Patient: Alis Hartman   MRN:     [id number] Gender:   female   Age:    63 year old :      1955        Preoperative Diagnosis: Vaginal dysplasia   Procedure(s):  Exam under anesthesia, vaginal biopsies, CO2 laser of the vagina     Past Medical History:   Diagnosis Date     Depression      Hypertension      Malignant neoplasm of endocervix (H)     Tx with radiation     Urinary incontinence       Past Surgical History:   Procedure Laterality Date     ABDOMINOPLASTY       BIOPSY CERVICAL, LOCAL EXCISION, SINGLE/MULTIPLE  10/26/2011    Procedure:BIOPSY CERVICAL, LOCAL EXCISION, SINGLE/MULTIPLE; EUA, cervical biopsies; Surgeon:BETTY TINEO; Location:[facility] OR     GI SURGERY      gastric bypass     LASER CO2 VAGINA N/A 3/2/2015    Procedure: LASER CO2 VAGINA;  Surgeon: Mariela Abdalla MD;  Location: [facility] OR          Anesthesia Evaluation     . Pt has had prior anesthetic. Type: MAC           ROS/MED HX    ENT/Pulmonary:  - neg pulmonary ROS     Neurologic:  - neg neurologic ROS     Cardiovascular:     (+) hypertension----. : . . . :. .       METS/Exercise Tolerance:     Hematologic:  - neg hematologic  ROS       Musculoskeletal: Comment: Low back pain        GI/Hepatic: Comment: Gastric Bypass        Renal/Genitourinary:  - ROS Renal section negative       Endo:  - neg endo ROS       Psychiatric:     (+) psychiatric history depression and schizophrenia      Infectious Disease:  - neg infectious disease ROS       Malignancy:      - no malignancy   Other:    (+) H/O Chronic Pain,H/O chronic opiod use ,                        PHYSICAL EXAM:   Mental Status/Neuro: A/A/O   Airway: Facies: Feasible  Mallampati: I  Mouth/Opening: Full  TM distance: > 6 cm  Neck ROM: Full   Respiratory: Auscultation: CTAB     Resp. Rate: Normal     Resp. Effort: Normal      CV: Rhythm: Regular  Rate: Age appropriate  Heart: Normal Sounds   Comments:      Dental: Normal           "        Lab Results   Component Value Date    WBC 3.9 (L) 01/26/2015    HGB 11.9 01/26/2015    HCT 37.5 01/26/2015     01/26/2015     01/26/2015    POTASSIUM 4.6 01/26/2015    CHLORIDE 106 01/26/2015    CO2 31 01/26/2015    BUN 8 01/26/2015    CR 0.63 01/26/2015    GLC 93 01/26/2015    SAMUEL 8.4 (L) 01/26/2015       Preop Vitals  BP Readings from Last 3 Encounters:   05/24/19 135/73   04/12/19 136/84   03/09/15 142/82    Pulse Readings from Last 3 Encounters:   04/12/19 65   03/09/15 72   01/26/15 85      Resp Readings from Last 3 Encounters:   05/24/19 16   04/12/19 18   03/09/15 18    SpO2 Readings from Last 3 Encounters:   05/24/19 100%   04/12/19 100%   03/09/15 100%      Temp Readings from Last 1 Encounters:   05/24/19 97.1  F (36.2  C) (Temporal)    Ht Readings from Last 1 Encounters:   04/12/19 1.6 m (5' 2.99\")      Wt Readings from Last 1 Encounters:   04/12/19 95 kg (209 lb 8 oz)    Estimated body mass index is 37.12 kg/m  as calculated from the following:    Height as of 4/12/19: 1.6 m (5' 2.99\").    Weight as of 4/12/19: 95 kg (209 lb 8 oz).     LDA:  Peripheral IV 05/24/19 Right Upper forearm (Active)   Site Assessment WDL 5/24/2019  6:37 AM   Line Status Infusing 5/24/2019  6:37 AM   Phlebitis Scale 0-->no symptoms 5/24/2019  6:37 AM   Infiltration Scale 0 5/24/2019  6:37 AM   Infiltration Site Treatment Method  None 5/24/2019  6:37 AM   Extravasation? No 5/24/2019  6:37 AM   Dressing Intervention New dressing  5/24/2019  6:37 AM   Number of days: 0            Assessment:   ASA SCORE: 3       Documentation: H&P complete; Preop Testing complete; Consents complete   Proceeding: Proceed without further delay  Tobacco Use:  NO Active use of Tobacco/UNKNOWN Tobacco use status     Plan:   Anes. Type:  MAC   Pre-Induction: Midazolam IV   Induction:  IV (Standard)   Airway: Native Airway   Access/Monitoring: PIV   Maintenance: Propofol; IV   Emergence: Procedure Site   Logistics: Same Day Surgery "     Postop Pain/Sedation Strategy:  Standard-Options: Opioids PRN     PONV Management:  Adult Risk Factors: Female, Non-Smoker, Postop Opioids  Prevention: Propofol Infusion; Ondansetron     CONSENT: Direct conversation   Plan and risks discussed with: Patient                            Mian Salas MD

## 2019-05-29 ENCOUNTER — TELEPHONE (OUTPATIENT)
Dept: ONCOLOGY | Facility: CLINIC | Age: 64
End: 2019-05-29

## 2019-05-29 LAB — COPATH REPORT: NORMAL

## 2019-05-29 NOTE — TELEPHONE ENCOUNTER
Post-Discharge Phone Call:    Pain:  1) Location: Pt reports only occasional throbbing vaginal pain.  2) Rate pain on scale 1-10:    3) Is your pain well controlled on your pain medication?:   4) How often are you taking your pain medication?:      GI:  5) Last bowel movement:   6) Are you having regular bowel movements? Yes  7) Eating/drinking well?: Yes  8) Nausea?: Yes after surgery for 1 day    Urinary:  9) Are you having problems or difficulty with urination? No Did have some burning with urination x 2days. This has resolved.      Lower Extremities:  10) Were lymph nodes removed during surgery?  N/A  11) If yes, have you been offered a lymphedema consult appointment?  N/A  12) Any pain, redness, or swelling in legs?  No  13) Any area on your legs that are warm to touch? No  14) Chest pain or severe shortness of breath? No    Wound:  15) Type of incision: CO2 laser and biopsy,vagina  Any of the following:  - Drainage (color, amount): Yes small amount of intermittent bleeding  - Odor: No  - Redness: N/A  - Chills: No  - Fever: No  16) Staples - Have you had your staples out yet? (staples should be removed 7-10 days post-op): N/A  17) Pt was reminded to wash incision (allow warm, soapy water to run over incision): N/A    Post-op:  18) Verify date and time of appointment: 6/3/19 6207  19) Pt was informed that pathology will be discussed at this appointment Yes    Any other questions or concerns at this time? No  Gypsy Cordero RN  BSN         normal...

## 2019-06-03 ENCOUNTER — ONCOLOGY VISIT (OUTPATIENT)
Dept: ONCOLOGY | Facility: CLINIC | Age: 64
End: 2019-06-03
Payer: COMMERCIAL

## 2019-06-03 VITALS
HEART RATE: 64 BPM | RESPIRATION RATE: 16 BRPM | TEMPERATURE: 98.1 F | SYSTOLIC BLOOD PRESSURE: 138 MMHG | OXYGEN SATURATION: 100 % | WEIGHT: 213.63 LBS | DIASTOLIC BLOOD PRESSURE: 77 MMHG | HEIGHT: 63 IN | BODY MASS INDEX: 37.85 KG/M2

## 2019-06-03 DIAGNOSIS — N39.0 URINARY TRACT INFECTION WITH HEMATURIA, SITE UNSPECIFIED: ICD-10-CM

## 2019-06-03 DIAGNOSIS — R82.90 NONSPECIFIC FINDING ON EXAMINATION OF URINE: ICD-10-CM

## 2019-06-03 DIAGNOSIS — R30.0 DYSURIA: Primary | ICD-10-CM

## 2019-06-03 DIAGNOSIS — R31.9 URINARY TRACT INFECTION WITH HEMATURIA, SITE UNSPECIFIED: ICD-10-CM

## 2019-06-03 DIAGNOSIS — R30.0 DYSURIA: ICD-10-CM

## 2019-06-03 LAB
ALBUMIN UR-MCNC: 10 MG/DL
APPEARANCE UR: ABNORMAL
BACTERIA #/AREA URNS HPF: ABNORMAL /HPF
BILIRUB UR QL STRIP: NEGATIVE
COLOR UR AUTO: YELLOW
GLUCOSE UR STRIP-MCNC: NEGATIVE MG/DL
HGB UR QL STRIP: ABNORMAL
KETONES UR STRIP-MCNC: NEGATIVE MG/DL
LEUKOCYTE ESTERASE UR QL STRIP: ABNORMAL
NITRATE UR QL: POSITIVE
NON-SQ EPI CELLS #/AREA URNS LPF: ABNORMAL /LPF
PH UR STRIP: 5.5 PH (ref 5–7)
RBC #/AREA URNS AUTO: ABNORMAL /HPF
SOURCE: ABNORMAL
SP GR UR STRIP: 1.01 (ref 1–1.03)
UROBILINOGEN UR STRIP-MCNC: NORMAL MG/DL (ref 0–2)
WBC #/AREA URNS AUTO: ABNORMAL /HPF

## 2019-06-03 PROCEDURE — 99215 OFFICE O/P EST HI 40 MIN: CPT | Performed by: OBSTETRICS & GYNECOLOGY

## 2019-06-03 PROCEDURE — 87088 URINE BACTERIA CULTURE: CPT | Performed by: OBSTETRICS & GYNECOLOGY

## 2019-06-03 PROCEDURE — 87186 SC STD MICRODIL/AGAR DIL: CPT | Performed by: OBSTETRICS & GYNECOLOGY

## 2019-06-03 PROCEDURE — 87086 URINE CULTURE/COLONY COUNT: CPT | Performed by: OBSTETRICS & GYNECOLOGY

## 2019-06-03 ASSESSMENT — MIFFLIN-ST. JEOR: SCORE: 1483

## 2019-06-03 ASSESSMENT — PAIN SCALES - GENERAL: PAINLEVEL: NO PAIN (0)

## 2019-06-03 NOTE — LETTER
6/3/2019         RE: Alis Hartman  [address]  [place] [address]        Dear Colleague,    Thank you for referring your patient, Alis Hartman, to the [facility]. Please see a copy of my visit note below.    Consult Notes on Referred Patient        Dr. Maria Fernanda Eckert  [facility]  [address]  [place] [address]       RE: Alis Hartman  : 1955  SHERMAN: Yesenia 3, 2019    Dear Dr. Maria Fernanda Eckert:    I had the pleasure of seeing your patient Alis Hartman here at the Gynecologic Cancer Clinic at the [facility] on Yesenia 3, 2019.  As you know she is a very pleasant 63 year old woman with a recent diagnosis of HSIL on pap in the setting of a history of cervical cancer and ÁLVARO 3 on biopsy s/p EUA, CO2 laser to vagina and colpo with Dr. Zuleta.   She is unaccompanied today.    She reports she has a history of cervical cancer sometime in the , however she is unsure exactly when (biopsy showing 1996 in Care Everywhere).  At that time she did not have a hysterectomy but was treated with primary radiation therapy, unsure if she also had chemotherapy or not.  This treatment was at [facility] [place] [place] and these records are unavailable.  Since that time she has intermittently followed with gynecologic oncology for paps and exams and has had a history of VAIN treated with CO2 laser in .  Recently she has had some vaginal bleeding and had another high grade pap smear in .  She also notes she has had persistent urinary symptoms since her radiation treatment and has had problems with recurrent UTI.  She feels she may have a UTI today and would like to be tested.    3/29/96:  Cervix biopsy:  Moderately differentiated squamous cell carcinoma-Dr Javi Morrow performed the biopsy    10/26/11:  EUA colposcopy and biopsy   Pathology: Cervix biopsy:  No dysplasia.  Uterus left apex:  Stroma hyalinization c/w irradiation.  Vagina upper biopsy:  no dysplasia     13:  Upper vaginal wall:  VAIN1    14:  EUA, CO2 laser to the vagina, colposcopy of vagina   Pathology:  VAIN3 at 3 o'clock    18:  Pap:  HSIL, HPV+    19: PROCEDURES: Vaginal colposcopy, vaginal biopsy, CO2 laser of the vagina  VAGINAL BIOPSY:   - Detached epithelium with high grade squamous intraepithelial lesion   (vaginal intraepithelial neoplasia 3)     Today: feels like she is itching inside her vagina, no bleeding or discharge per report.  No fevers or chills. + pain with urination and wants UA/Ucx today.     Review of Systems:  Systemic           no weight changes; no fever; no chills; no night sweats; no appetite changes  Skin           no rashes, or lesions  Eye           no irritation; no changes in vision  Jamir-Laryngeal           no dysphagia; no hoarseness   Pulmonary    no cough; no shortness of breath  Cardiovascular    no chest pain; no palpitations  Gastrointestinal    no diarrhea; no constipation; no abdominal pain; no changes in bowel  habits; no blood in stool  Genitourinary   + urinary frequency; + urinary urgency; no dysuria; no pain; no abnormal vaginal discharge; no abnormal vaginal bleeding  Breast    no breast discharge; no breast changes; no breast pain  Musculoskeletal    no myalgias; no arthralgias; no back pain; + joint pain  Psychiatric           no depressed mood; no anxiety    Hematologic            no tender lymph nodes; no noticeable swellings or lumps   Endocrine    no hot flashes; no heat/cold intolerance         Neurological   no tremor; no numbness and tingling; no headaches; no difficulty sleeping    Obstetrics and Gynecology History:  ,  x 2  No HRT      Past Medical History:  Past Medical History:   Diagnosis Date     Depression      Hypertension      Malignant neoplasm of endocervix (H)     Tx with radiation     Urinary incontinence        Past Surgical History:  Past Surgical History:   Procedure Laterality Date      ABDOMINOPLASTY       BIOPSY CERVICAL, LOCAL EXCISION, SINGLE/MULTIPLE  10/26/2011    Procedure:BIOPSY CERVICAL, LOCAL EXCISION, SINGLE/MULTIPLE; EUA, cervical biopsies; Surgeon:BETTY TINEO; Location: OR     GI SURGERY  2004    gastric bypass     LASER CO2 VAGINA N/A 3/2/2015    Procedure: LASER CO2 VAGINA;  Surgeon: Mariela Abdalla MD;  Location:  OR     LASER CO2 VAGINA N/A 5/24/2019    Procedure: Exam under anesthesia, vaginal biopsies, CO2 laser of the vagina;  Surgeon: Lilliam Roy MD;  Location:  OR       Health Maintenance:  Last Pap Smear: See HPI due to history of cervical cancer    Last Mammogram: 1/23/19             Result: normal      She has not had a history of abnormal mammograms.    Last Colonoscopy: Never      Current Medications:   has a current medication list which includes the following prescription(s): acetaminophen, calcium citrate-vitamin d, childrens multivitamin w/iron, cholecalciferol, cyanocobalamin, hydrochlorothiazide, lidocaine-prilocaine, metronidazole, multi-vitamin, narcan, oxycodone ir, oxycodone-acetaminophen, cvs chewable childrens vitamin, phenazopyridine, sertraline, and zolpidem.     Allergies:   Ibuprofen; Shrimp; and Sulfa drugs      Social History:  Social History     Socioeconomic History     Marital status: Single     Spouse name: Not on file     Number of children: Not on file     Years of education: Not on file     Highest education level: Not on file   Occupational History     Not on file   Social Needs     Financial resource strain: Not on file     Food insecurity:     Worry: Not on file     Inability: Not on file     Transportation needs:     Medical: Not on file     Non-medical: Not on file   Tobacco Use     Smoking status: Never Smoker     Smokeless tobacco: Never Used   Substance and Sexual Activity     Alcohol use: No     Drug use: No     Sexual activity: Not on file   Lifestyle     Physical activity:     Days per week: Not on file  "    Minutes per session: Not on file     Stress: Not on file   Relationships     Social connections:     Talks on phone: Not on file     Gets together: Not on file     Attends Mandaen service: Not on file     Active member of club or organization: Not on file     Attends meetings of clubs or organizations: Not on file     Relationship status: Not on file     Intimate partner violence:     Fear of current or ex partner: Not on file     Emotionally abused: Not on file     Physically abused: Not on file     Forced sexual activity: Not on file   Other Topics Concern     Parent/sibling w/ CABG, MI or angioplasty before 65F 55M? Not Asked   Social History Narrative     Not on file       Lives alone, feels safe at home.  On disability for mental illness.  Enjoys spending time with grandchildren.  Does not have an advanced directive on file and would like her son, Hemanth and sister Rebecca Landeros to be her POA.  Full code.    Family History:   The patient's family history is significant for.  Family History   Problem Relation Age of Onset     Heart Disease Father      Hypertension Sister          Physical Exam:   /77 (BP Location: Right arm)   Pulse 64   Temp 98.1  F (36.7  C) (Oral)   Resp 16   Ht 1.6 m (5' 2.99\")   Wt 96.9 kg (213 lb 10 oz)   SpO2 100%   BMI 37.85 kg/m     Body mass index is 37.85 kg/m .    General Appearance: healthy and alert, no distress     Neurological: normal gait, no gross defects     Psychiatric: appropriate mood and affect            Genitourinary: External genitalia and urethral meatus appears normal.  Vagina is smooth without nodularity or masses.  Cervix is unable to be distinguished from the vagina due to radiation changes.  Bimanual exam reveal no masses, nodularity or fullness.  Tissue healing william along anterior aspect of vagina, some discharge present at apex, not malodorous, appears to be healing well.      Assessment:    Alis Hartman is a 63 year old woman with a new " diagnosis of HSIL on pap in the setting of a history of cervical cancer now s/p biopsy showing ÁLVARO 3 and CO2 laser of vagina and EUA with Dr. Webb. In the healing process currently. Dysuria today.    A total of 60 minutes was spent with the patient, >50% of which were spent in counseling the patient and/or treatment planning, this was separate from the 5 minutes spent on post-operative cares.      Plan:     1.)    HSIL in the setting of a history of cervical cancer and vaginal dysplasia.  ÁLVARO 3 on most recent biopsy now s/p EUA with CO2 laser of the dysplastic areas.  RTC in 6 mos for repeat pap smear/colposcopy and to follow up with Dr. Webb. Ok to douche because patient wants to see if helps with itching, can use monistat OTC if no improvement.      2.) Genetic risk factors were assessed and the patient does not meet the qualifications for a referral.      3.) Labs and/or tests ordered include:  UA/Ucx for dysuria.     4.) Health maintenance issues addressed today include colonoscopy which will be addressed following treatment for her HSIL.          Thank you for allowing us to participate in the care of your patient.         Sincerely,  Mariela Abdalla MD    Department of Ob/Gyn and Women's Health  Division of Gynecologic Oncology  [facility]  [phone]    Of a 25 minute appointment, more than 50% was spent in counseling the patient.        OPAL JOHNSON         Again, thank you for allowing me to participate in the care of your patient.        Sincerely,        Mariela Abdalla MD

## 2019-06-03 NOTE — NURSING NOTE
"Oncology Rooming Note    Yesenia 3, 2019 12:32 PM   Alis Hartman is a 63 year old female who presents for:    Chief Complaint   Patient presents with     Oncology Clinic Visit     Follow Up     Initial Vitals: /77 (BP Location: Right arm)   Pulse 64   Temp 98.1  F (36.7  C) (Oral)   Resp 16   Ht 1.6 m (5' 2.99\")   Wt 96.9 kg (213 lb 10 oz)   SpO2 100%   BMI 37.85 kg/m   Estimated body mass index is 37.85 kg/m  as calculated from the following:    Height as of this encounter: 1.6 m (5' 2.99\").    Weight as of this encounter: 96.9 kg (213 lb 10 oz). Body surface area is 2.08 meters squared.  No Pain (0) Comment: Data Unavailable   No LMP recorded. Patient is postmenopausal.  Allergies reviewed: Yes  Medications reviewed: Yes    Medications: Medication refills not needed today.  Pharmacy name entered into [facility]:    [id number] - [place] [place], [place] - [address] [place]  [id number] - [facility] [place] - [address]  [id number] - [place] - [address]    Virginia Stern LPN              "

## 2019-06-03 NOTE — PROGRESS NOTES
Consult Notes on Referred Patient        Dr. Maria Fernanda Eckert  [facility]  [address]  [place] [address]       RE: Alis Hartman  : 1955  SHERMAN: Yesenia 3, 2019    Dear Dr. Maria Fernanda Eckert:    I had the pleasure of seeing your patient Alis Hartman here at the Gynecologic Cancer Clinic at the [facility] on Yesenia 3, 2019.  As you know she is a very pleasant 63 year old woman with a recent diagnosis of HSIL on pap in the setting of a history of cervical cancer and ÁLVARO 3 on biopsy s/p EUA, CO2 laser to vagina and colpo with Dr. Zuleta.   She is unaccompanied today.    She reports she has a history of cervical cancer sometime in the , however she is unsure exactly when (biopsy showing 1996 in Care Everywhere).  At that time she did not have a hysterectomy but was treated with primary radiation therapy, unsure if she also had chemotherapy or not.  This treatment was at [facility] and these records are unavailable.  Since that time she has intermittently followed with gynecologic oncology for paps and exams and has had a history of VAIN treated with CO2 laser in .  Recently she has had some vaginal bleeding and had another high grade pap smear in .  She also notes she has had persistent urinary symptoms since her radiation treatment and has had problems with recurrent UTI.  She feels she may have a UTI today and would like to be tested.    3/29/96:  Cervix biopsy:  Moderately differentiated squamous cell carcinoma-Dr Javi Morrow performed the biopsy    10/26/11:  EUA colposcopy and biopsy   Pathology: Cervix biopsy:  No dysplasia.  Uterus left apex:  Stroma hyalinization c/w irradiation.  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'clock    18:  Pap:  HSIL, HPV+    19: PROCEDURES: Vaginal colposcopy, vaginal biopsy, CO2 laser  of the vagina  VAGINAL BIOPSY:   - Detached epithelium with high grade squamous intraepithelial lesion   (vaginal intraepithelial neoplasia 3)     Today: feels like she is itching inside her vagina, no bleeding or discharge per report.  No fevers or chills. + pain with urination and wants UA/Ucx today.     Review of Systems:  Systemic           no weight changes; no fever; no chills; no night sweats; no appetite changes  Skin           no rashes, or lesions  Eye           no irritation; no changes in vision  Jamir-Laryngeal           no dysphagia; no hoarseness   Pulmonary    no cough; no shortness of breath  Cardiovascular    no chest pain; no palpitations  Gastrointestinal    no diarrhea; no constipation; no abdominal pain; no changes in bowel  habits; no blood in stool  Genitourinary   + urinary frequency; + urinary urgency; no dysuria; no pain; no abnormal vaginal discharge; no abnormal vaginal bleeding  Breast    no breast discharge; no breast changes; no breast pain  Musculoskeletal    no myalgias; no arthralgias; no back pain; + joint pain  Psychiatric           no depressed mood; no anxiety    Hematologic            no tender lymph nodes; no noticeable swellings or lumps   Endocrine    no hot flashes; no heat/cold intolerance         Neurological   no tremor; no numbness and tingling; no headaches; no difficulty sleeping    Obstetrics and Gynecology History:  ,  x 2  No HRT      Past Medical History:  Past Medical History:   Diagnosis Date     Depression      Hypertension      Malignant neoplasm of endocervix (H)     Tx with radiation     Urinary incontinence        Past Surgical History:  Past Surgical History:   Procedure Laterality Date     ABDOMINOPLASTY       BIOPSY CERVICAL, LOCAL EXCISION, SINGLE/MULTIPLE  10/26/2011    Procedure:BIOPSY CERVICAL, LOCAL EXCISION, SINGLE/MULTIPLE; EUA, cervical biopsies; Surgeon:BETTY TINEO; Location:[facility]     GI SURGERY      gastric bypass     LASER  CO2 VAGINA N/A 3/2/2015    Procedure: LASER CO2 VAGINA;  Surgeon: Mariela Abdalla MD;  Location:  OR     LASER CO2 VAGINA N/A 5/24/2019    Procedure: Exam under anesthesia, vaginal biopsies, CO2 laser of the vagina;  Surgeon: Lilliam Roy MD;  Location:  OR       Health Maintenance:  Last Pap Smear: See HPI due to history of cervical cancer    Last Mammogram: 1/23/19             Result: normal      She has not had a history of abnormal mammograms.    Last Colonoscopy: Never      Current Medications:   has a current medication list which includes the following prescription(s): acetaminophen, calcium citrate-vitamin d, childrens multivitamin w/iron, cholecalciferol, cyanocobalamin, hydrochlorothiazide, lidocaine-prilocaine, metronidazole, multi-vitamin, narcan, oxycodone ir, oxycodone-acetaminophen, cvs chewable childrens vitamin, phenazopyridine, sertraline, and zolpidem.     Allergies:   Ibuprofen; Shrimp; and Sulfa drugs      Social History:  Social History     Socioeconomic History     Marital status: Single     Spouse name: Not on file     Number of children: Not on file     Years of education: Not on file     Highest education level: Not on file   Occupational History     Not on file   Social Needs     Financial resource strain: Not on file     Food insecurity:     Worry: Not on file     Inability: Not on file     Transportation needs:     Medical: Not on file     Non-medical: Not on file   Tobacco Use     Smoking status: Never Smoker     Smokeless tobacco: Never Used   Substance and Sexual Activity     Alcohol use: No     Drug use: No     Sexual activity: Not on file   Lifestyle     Physical activity:     Days per week: Not on file     Minutes per session: Not on file     Stress: Not on file   Relationships     Social connections:     Talks on phone: Not on file     Gets together: Not on file     Attends Sikh service: Not on file     Active member of club or organization: Not on file  "    Attends meetings of clubs or organizations: Not on file     Relationship status: Not on file     Intimate partner violence:     Fear of current or ex partner: Not on file     Emotionally abused: Not on file     Physically abused: Not on file     Forced sexual activity: Not on file   Other Topics Concern     Parent/sibling w/ CABG, MI or angioplasty before 65F 55M? Not Asked   Social History Narrative     Not on file       Lives alone, feels safe at home.  On disability for mental illness.  Enjoys spending time with grandchildren.  Does not have an advanced directive on file and would like her son, Hemanth and sister Rebecca Landeros to be her POA.  Full code.    Family History:   The patient's family history is significant for.  Family History   Problem Relation Age of Onset     Heart Disease Father      Hypertension Sister          Physical Exam:   /77 (BP Location: Right arm)   Pulse 64   Temp 98.1  F (36.7  C) (Oral)   Resp 16   Ht 1.6 m (5' 2.99\")   Wt 96.9 kg (213 lb 10 oz)   SpO2 100%   BMI 37.85 kg/m    Body mass index is 37.85 kg/m .    General Appearance: healthy and alert, no distress     Neurological: normal gait, no gross defects     Psychiatric: appropriate mood and affect            Genitourinary: External genitalia and urethral meatus appears normal.  Vagina is smooth without nodularity or masses.  Cervix is unable to be distinguished from the vagina due to radiation changes.  Bimanual exam reveal no masses, nodularity or fullness.  Tissue healing william along anterior aspect of vagina, some discharge present at apex, not malodorous, appears to be healing well.      Assessment:    Alis Hartman is a 63 year old woman with a new diagnosis of HSIL on pap in the setting of a history of cervical cancer now s/p biopsy showing ÁLVARO 3 and CO2 laser of vagina and EUA with Dr. Webb. In the healing process currently. Dysuria today.    A total of 60 minutes was spent with the patient, >50% of " which were spent in counseling the patient and/or treatment planning, this was separate from the 5 minutes spent on post-operative cares.      Plan:     1.)    HSIL in the setting of a history of cervical cancer and vaginal dysplasia.  ÁLVARO 3 on most recent biopsy now s/p EUA with CO2 laser of the dysplastic areas.  RTC in 6 mos for repeat pap smear/colposcopy and to follow up with Dr. Webb. Ok to douche because patient wants to see if helps with itching, can use monistat OTC if no improvement.      2.) Genetic risk factors were assessed and the patient does not meet the qualifications for a referral.      3.) Labs and/or tests ordered include:  UA/Ucx for dysuria.     4.) Health maintenance issues addressed today include colonoscopy which will be addressed following treatment for her HSIL.          Thank you for allowing us to participate in the care of your patient.         Sincerely,  Mariela Abdalla MD    Department of Ob/Gyn and Women's Health  Division of Gynecologic Oncology  [facility]  [phone]    Of a 25 minute appointment, more than 50% was spent in counseling the patient.        CC  OPAL GONSALES

## 2019-06-04 NOTE — RESULT ENCOUNTER NOTE
Aren, UA looks positive please let her know we are awaiting culture.  Mariela Abdalla MD    Department of Ob/Gyn and Women's Health  Division of Gynecologic Oncology  [facility]  [phone]

## 2019-06-05 ENCOUNTER — TELEPHONE (OUTPATIENT)
Dept: ONCOLOGY | Facility: CLINIC | Age: 64
End: 2019-06-05

## 2019-06-05 LAB
BACTERIA SPEC CULT: ABNORMAL
SPECIMEN SOURCE: ABNORMAL

## 2019-06-05 RX ORDER — AMOXICILLIN AND CLAVULANATE POTASSIUM 500; 125 MG/1; MG/1
1 TABLET, FILM COATED ORAL 2 TIMES DAILY
Qty: 20 TABLET | Refills: 0 | Status: SHIPPED | OUTPATIENT
Start: 2019-06-05 | End: 2020-02-28

## 2019-06-05 NOTE — TELEPHONE ENCOUNTER
Patient urine culture final.    Antibiotic to be sent to patient pharmacy.  Called patient  And she will  and start today.    SHOAIB England/[place], [address]

## 2019-06-06 DIAGNOSIS — N39.0 URINARY TRACT INFECTION: Primary | ICD-10-CM

## 2019-06-06 RX ORDER — NITROFURANTOIN 25; 75 MG/1; MG/1
100 CAPSULE ORAL 2 TIMES DAILY
Qty: 10 CAPSULE | Refills: 0 | Status: SHIPPED | OUTPATIENT
Start: 2019-06-06 | End: 2019-10-14

## 2019-10-09 ENCOUNTER — TELEPHONE (OUTPATIENT)
Dept: ONCOLOGY | Facility: CLINIC | Age: 64
End: 2019-10-09

## 2019-10-09 NOTE — TELEPHONE ENCOUNTER
"Received vm from patient stating she was having \"very terrible\" pain in her vagina. Writer called patient and she reports pain started 3-4 days ago. [facility] to [facility] 10[facility]. She was started on Cipro but was called the next day to say her UA/UC was negative. Rates pain 10/10 and states it is constant. Worsens with voiding. States pain is higher inside her and then when she voids she feels it burning where urine is coming out.  Denies bleeding or discharge. Denies fevers/chills. Tried Tylenol without relief. She took Pyridium for a few days and felt it helped a little.     Discussed symptoms with Do Mendiola NP. Offered to evaluate patient in clinic today. Patient is in [place] today and unable to come in. Advised patient go to an Urgent care to be evaluated, as this would be better than a [facility]. Patient agreed and denies further questions.   Gypsy Cordero  RN, BSN, OCN    Left vm for patient. Dr Webb feels she should be evaluated by gyn/onc. Offered appt available next week. Provided scheduling phone number and this RN's.  Gypsy Cordero  RN, BSN, OCN        "

## 2019-10-10 NOTE — TELEPHONE ENCOUNTER
Called patient to follow up on how she was feeling and to verify she received vm yesterday regarding scheduling appt with gyn/onc. Patient states she went to [facility] ED yesterday. (Unable to see records in Care everywhere as not authorized)States she was started on another antibiotic and pyridium. Continues to have pain but pyridium is helping. Advised per Dr Webb, patient should follow up with gyn/onc. Patient agreed and stated she really feels she should have a gyn exam. Offered appointment on Monday with Do Mendiola NP.   Gypsy Cordero  RN, BSN, OCN

## 2019-10-14 ENCOUNTER — ONCOLOGY VISIT (OUTPATIENT)
Dept: ONCOLOGY | Facility: CLINIC | Age: 64
End: 2019-10-14
Payer: MEDICARE

## 2019-10-14 VITALS
TEMPERATURE: 97.9 F | WEIGHT: 220.6 LBS | SYSTOLIC BLOOD PRESSURE: 123 MMHG | DIASTOLIC BLOOD PRESSURE: 78 MMHG | OXYGEN SATURATION: 97 % | HEIGHT: 63 IN | BODY MASS INDEX: 39.09 KG/M2 | RESPIRATION RATE: 18 BRPM | HEART RATE: 82 BPM

## 2019-10-14 DIAGNOSIS — C53.9 MALIGNANT NEOPLASM OF CERVIX, UNSPECIFIED SITE (H): Primary | ICD-10-CM

## 2019-10-14 DIAGNOSIS — R10.9 FLANK PAIN: ICD-10-CM

## 2019-10-14 DIAGNOSIS — R31.9 URINARY TRACT INFECTION WITH HEMATURIA, SITE UNSPECIFIED: ICD-10-CM

## 2019-10-14 DIAGNOSIS — N39.0 URINARY TRACT INFECTION WITH HEMATURIA, SITE UNSPECIFIED: ICD-10-CM

## 2019-10-14 PROCEDURE — 87624 HPV HI-RISK TYP POOLED RSLT: CPT | Performed by: NURSE PRACTITIONER

## 2019-10-14 PROCEDURE — G0476 HPV COMBO ASSAY CA SCREEN: HCPCS | Performed by: NURSE PRACTITIONER

## 2019-10-14 PROCEDURE — 99213 OFFICE O/P EST LOW 20 MIN: CPT | Performed by: NURSE PRACTITIONER

## 2019-10-14 PROCEDURE — 88175 CYTOPATH C/V AUTO FLUID REDO: CPT | Performed by: NURSE PRACTITIONER

## 2019-10-14 RX ORDER — PHENAZOPYRIDINE HYDROCHLORIDE 200 MG/1
200 TABLET, FILM COATED ORAL 3 TIMES DAILY PRN
Qty: 6 TABLET | Refills: 0 | Status: SHIPPED | OUTPATIENT
Start: 2019-10-14 | End: 2020-02-28

## 2019-10-14 ASSESSMENT — PAIN SCALES - GENERAL: PAINLEVEL: EXTREME PAIN (8)

## 2019-10-14 ASSESSMENT — MIFFLIN-ST. JEOR: SCORE: 1509.64

## 2019-10-14 NOTE — LETTER
10/14/2019         RE: Alis Hartman  [address]  [place] [address]        Dear Colleague,    Thank you for referring your patient, Alis Hartman, to the [facility]. Please see a copy of my visit note below.                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   She is here today for an acute visit.       History:     She reports she has a history of cervical cancer sometime in the , however she is unsure exactly when (biopsy showing 1996 in Care Everywhere).  At that time she did not have a hysterectomy but was treated with primary radiation therapy, unsure if she also had chemotherapy or not.  This treatment was at [facility] and these records are unavailable.  Since that time she has intermittently followed with gynecologic oncology for paps and exams and has had a history of VAIN treated with CO2 laser in .  Recently she has had some vaginal bleeding and had another high grade pap smear in .  She also notes she has had persistent urinary symptoms since her radiation treatment and has had problems with recurrent UTI.  She feels she may have a UTI today and would like to be tested.     3/29/96:  Cervix biopsy:  Moderately differentiated squamous cell carcinoma-Dr Javi Morrow performed the biopsy     10/26/11:  EUA colposcopy and biopsy                 Pathology: Cervix biopsy:  No dysplasia.  Uterus left apex:  Stroma hyalinization c/w irradiation.  Vagina upper biopsy: no dysplasia     13:  Upper vaginal wall:  VAIN1     14:  EUA, CO2 laser to the vagina, colposcopy of vagina                 Pathology:  VAIN3 at 3 o'clock     18:  Pap:  HSIL, HPV+     19:  PROCEDURES: Vaginal colposcopy, vaginal biopsy, CO2 laser of the vagina  VAGINAL BIOPSY:   - Detached epithelium with high grade squamous intraepithelial lesion (vaginal intraepithelial neoplasia 3)     10/14/19: Pap/HPV pending.         Today she comes to clinic reporting she has been tested for a UTI on 10/6 at urgent care and was started on Cipro and then told to stop; she went to the ED on 10/9 and was restarted on Keflex and pyridium; she did have a large amount of blood in her urine. She does have some flank and mid back pain. Of note, she was treated for another UTI successfully about 3 weeks prior to the urgent care visit. She denies any vaginal discharge, no vaginal bleeding, and has not been sexually active for past 3-5 months. She states it has been easier to urinate since starting the Keflex and her pain has decreased as well with the Keflex and Pyridium; she would like more Pyridium. She reports she is able to empty her bladder but that her discomfort does increase with urination; she denies discomfort upon the urine leaving and coming into contact with her skin. She is otherwise the same and states she recovered well from her surgery in May.         Review of Systems:    Systemic           no weight changes; no fever; no chills; no night sweats; no appetite changes  Skin           no rashes, or lesions  Eye           no irritation; no changes in vision  Jamir-Laryngeal           no dysphagia; no hoarseness   Pulmonary    no cough; no shortness of breath  Cardiovascular    no chest pain; no palpitations  Gastrointestinal    no diarrhea; no constipation; no abdominal pain; no changes in bowel habits; no blood in stool  Genitourinary   no urinary frequency; no urinary urgency; no dysuria; no pain; no abnormal vaginal discharge; no abnormal vaginal bleeding  Breast    no breast discharge; no breast changes; no breast pain  Musculoskeletal    no myalgias; + arthralgias; + back pain  Psychiatric           no  depressed mood; no anxiety    Hematologic               no tender lymph nodes; no noticeable swellings or lumps   Endocrine    +hot flashes; no heat/cold intolerance         Neurological   no tremor; no numbness and tingling; no headaches; no difficulty sleeping      Past Medical History:    Past Medical History:   Diagnosis Date     Depression      Hypertension      Malignant neoplasm of endocervix (H)     Tx with radiation     Urinary incontinence          Past Surgical History:    Past Surgical History:   Procedure Laterality Date     ABDOMINOPLASTY       BIOPSY CERVICAL, LOCAL EXCISION, SINGLE/MULTIPLE  10/26/2011    Procedure:BIOPSY CERVICAL, LOCAL EXCISION, SINGLE/MULTIPLE; EUA, cervical biopsies; Surgeon:BETTY TINEO; Location:[facility]     GI SURGERY  2004    gastric bypass     LASER CO2 VAGINA N/A 3/2/2015    Procedure: LASER CO2 VAGINA;  Surgeon: Mariela Abdalla MD;  Location: [facility] OR     LASER CO2 VAGINA N/A 5/24/2019    Procedure: Exam under anesthesia, vaginal biopsies, CO2 laser of the vagina;  Surgeon: Lilliam Roy MD;  Location:  OR         Health Maintenance Due   Topic Date Due     DEXA  1955     HEPATITIS C SCREENING  1955     ADVANCE CARE PLANNING  1955     COLONOSCOPY  07/02/1963     DTAP/TDAP/TD IMMUNIZATION (1 - Tdap) 07/02/1978     LIPID  07/02/1998     ZOSTER IMMUNIZATION (1 of 2) 07/02/2003     MEDICARE ANNUAL WELLNESS VISIT  07/02/2018     PNEUMOCOCCAL IMMUNIZATION 65+ HIGH/HIGHEST RISK (1 of 2 - PCV13) 07/02/2018     INFLUENZA VACCINE (1) 09/01/2019       Current Medications:     Current Outpatient Medications   Medication Sig Dispense Refill     acetaminophen (TYLENOL) 500 MG tablet Take 1,000 mg by mouth       amoxicillin-clavulanate (AUGMENTIN) 500-125 MG tablet Take 1 tablet by mouth 2 times daily 20 tablet 0     calcium Citrate-vitamin D (CALCET CREAMY BITES) 500-400 MG-UNIT CHEW Take 1 tablet by mouth       childrens multivitamin w/iron  "(FLINTSTONES COMPLETE) 60 MG chewable tablet Take 2 tablets by mouth       cholecalciferol (D-3-5) 5000 units CAPS Take 5000 mg 4 times a week       cyanocobalamin (CYANOCOBALAMIN) 1000 MCG/ML injection Inject 1,000 mcg into the muscle       hydrochlorothiazide (HYDRODIURIL) 25 MG tablet Take 25 mg by mouth       lidocaine-prilocaine (EMLA) 2.5-2.5 % external cream        metroNIDAZOLE (METROGEL) 0.75 % vaginal gel INSERT 1 APPLICATORFUL BY VAGINAL ROUTE AT BEDTIME FOR 5 DAYS.  0     Multiple Vitamin (MULTI-VITAMIN) per tablet Take 1 tablet by mouth daily.       NARCAN 4 MG/0.1ML nasal spray TK UTD PER PACKAGE INSERT  0     oxyCODONE IR (ROXICODONE) 10 MG tablet TK 1 T PO  QID PRF SEVERE PAIN RELIEF  0     oxyCODONE-Acetaminophen  MG TABS Take 1 tablet by mouth       Pediatric Multivit-Minerals-C ([employer] CHEWABLE CHILDRENS VITAMIN) CHEW TAKE 2 TABLETS BY MOUTH TWICE A DAY  3     phenazopyridine (PYRIDIUM) 200 MG tablet Take 1 tablet (200 mg) by mouth 3 times daily as needed for irritation 6 tablet 0     sertraline (ZOLOFT) 25 MG tablet Take 1 tablet by mouth daily.       zolpidem (AMBIEN) 10 MG tablet Take 10 mg by mouth       phenazopyridine (PYRIDIUM) 100 MG tablet TK 1 T PO  TID PRN P  2         Allergies:        Allergies   Allergen Reactions     Ibuprofen Nausea and Vomiting     Shrimp      Sulfa Drugs Rash        Social History:     Social History     Tobacco Use     Smoking status: Never Smoker     Smokeless tobacco: Never Used   Substance Use Topics     Alcohol use: No       History   Drug Use No         Family History:       Family History   Problem Relation Age of Onset     Heart Disease Father      Hypertension Sister          Physical Exam:     /78 (BP Location: Right arm)   Pulse 82   Temp 97.9  F (36.6  C) (Oral)   Resp 18   Ht 1.6 m (5' 2.99\")   Wt 100.1 kg (220 lb 9.6 oz)   SpO2 97%   BMI 39.09 kg/m     Body mass index is 39.09 kg/m .    General Appearance: healthy and alert, no " distress     HEENT: no thyromegaly, no palpable nodules or masses        Cardiovascular: regular rate and rhythm, no gallops, rubs or murmurs     Respiratory: lungs clear, no rales, rhonchi or wheezes, normal diaphragmatic excursion    Musculoskeletal: extremities non tender and without edema    Skin: no lesions or rashes     Neurological: normal gait, no gross defects     Psychiatric: appropriate mood and affect                               Hematological: normal cervical, supraclavicular and inguinal lymph nodes     Gastrointestinal:       abdomen soft, non-tender, non-distended, no organomegaly or masses    Genitourinary: External genitalia and urethral meatus appears normal.  Vagina is smooth without nodularity or masses, foreshortened. No blood or discharge in vaginal vault. No pain/tenderness with palpation at anterior vagina. Pap collected .      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   She is here today for an acute visit.    20 minutes were spent with this patient, over 50% of that time was spent in symptom management, treatment planning and in counseling and coordination of care.      Plan:     1.)       Will collect a pap/HPV today and follow up as needed. Given she has had at least 3 UTIs since June, will refer to Urology for further evaluation. Given her UA have had large blood and she is having flank pain, will evaluate for kidney stones. Encouraged to continue on her antibiotic until completion. She will otherwise return for her scheduled visit in December with Dr. Webb.      2.) Rx Pyridium x 2 days to coincide with antibiotic completion.     3.) Labs and/or tests ordered include:  CT ap. Urology.      4.) Health maintenance issues addressed today include annual health maintenance and non-gynecologic issues with PCP.    Do Mendiola,  ERINN, WHNP-BC, ANP-BC  Women's Health Nurse Practitioner  Adult Nurse Pracitioner  Division of Gynecologic Oncology          CC  Patient Care Team:  Selisker, Helen, NP as PCP - General      Again, thank you for allowing me to participate in the care of your patient.        Sincerely,        ERINN Sweet CNP

## 2019-10-14 NOTE — NURSING NOTE
"Oncology Rooming Note    October 14, 2019 10:27 AM   Alis Hartman is a 64 year old female who presents for:    Chief Complaint   Patient presents with     Oncology Clinic Visit     Vaginal pain     Initial Vitals: /78 (BP Location: Right arm)   Pulse 82   Temp 97.9  F (36.6  C) (Oral)   Resp 18   Ht 1.6 m (5' 2.99\")   Wt 100.1 kg (220 lb 9.6 oz)   SpO2 97%   BMI 39.09 kg/m   Estimated body mass index is 39.09 kg/m  as calculated from the following:    Height as of this encounter: 1.6 m (5' 2.99\").    Weight as of this encounter: 100.1 kg (220 lb 9.6 oz). Body surface area is 2.11 meters squared.  Extreme Pain (8) Comment: Data Unavailable   No LMP recorded. Patient is postmenopausal.  Allergies reviewed: Yes  Medications reviewed: Yes    Medications: Medication refills not needed today.  Pharmacy name entered into [employer]:    [id number] [place] [facility] [address] [facility]  [id number] [place] [facility] [address] [id number] - [place] - [address] [facility] AT [address]              "

## 2019-10-14 NOTE — PROGRESS NOTES
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   She is here today for an acute visit.       History:     She reports she has a history of cervical cancer sometime in the , however she is unsure exactly when (biopsy showing 1996 in Care Everywhere).  At that time she did not have a hysterectomy but was treated with primary radiation therapy, unsure if she also had chemotherapy or not.  This treatment was at [facility] and these records are unavailable.  Since that time she has intermittently followed with gynecologic oncology for paps and exams and has had a history of VAIN treated with CO2 laser in .  Recently she has had some vaginal bleeding and had another high grade pap smear in .  She also notes she has had persistent urinary symptoms since her radiation treatment and has had problems with recurrent UTI.  She feels she may have a UTI today and would like to be tested.     3/29/96:  Cervix biopsy:  Moderately differentiated squamous cell carcinoma-Dr Javi Morrow performed the biopsy     10/26/11:  EUA colposcopy and biopsy                 Pathology: Cervix biopsy:  No dysplasia.  Uterus left apex:  Stroma hyalinization c/w irradiation.  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         Today she comes to clinic  reporting she has been tested for a UTI on 10/6 at urgent care and was started on Cipro and then told to stop; she went to the ED on 10/9 and was restarted on Keflex and pyridium; she did have a large amount of blood in her urine. She does have some flank and mid back pain. Of note, she was treated for another UTI successfully about 3 weeks prior to the urgent care visit. She denies any vaginal discharge, no vaginal bleeding, and has not been sexually active for past 3-5 months. She states it has been easier to urinate since starting the Keflex and her pain has decreased as well with the Keflex and Pyridium; she would like more Pyridium. She reports she is able to empty her bladder but that her discomfort does increase with urination; she denies discomfort upon the urine leaving and coming into contact with her skin. She is otherwise the same and states she recovered well from her surgery in May.         Review of Systems:    Systemic           no weight changes; no fever; no chills; no night sweats; no appetite changes  Skin           no rashes, or lesions  Eye           no irritation; no changes in vision  Jamir-Laryngeal           no dysphagia; no hoarseness   Pulmonary    no cough; no shortness of breath  Cardiovascular    no chest pain; no palpitations  Gastrointestinal    no diarrhea; no constipation; no abdominal pain; no changes in bowel habits; no blood in stool  Genitourinary   no urinary frequency; no urinary urgency; no dysuria; no pain; no abnormal vaginal discharge; no abnormal vaginal bleeding  Breast    no breast discharge; no breast changes; no breast pain  Musculoskeletal    no myalgias; + arthralgias; + back pain  Psychiatric           no depressed mood; no anxiety    Hematologic               no tender lymph nodes; no noticeable swellings or lumps   Endocrine    +hot flashes; no heat/cold intolerance         Neurological   no tremor; no numbness and tingling; no headaches; no difficulty  sleeping      Past Medical History:    Past Medical History:   Diagnosis Date     Depression      Hypertension      Malignant neoplasm of endocervix (H)     Tx with radiation     Urinary incontinence          Past Surgical History:    Past Surgical History:   Procedure Laterality Date     ABDOMINOPLASTY       BIOPSY CERVICAL, LOCAL EXCISION, SINGLE/MULTIPLE  10/26/2011    Procedure:BIOPSY CERVICAL, LOCAL EXCISION, SINGLE/MULTIPLE; EUA, cervical biopsies; Surgeon:BETTY TINEO; Location:[facility] OR     GI SURGERY  2004    gastric bypass     LASER CO2 VAGINA N/A 3/2/2015    Procedure: LASER CO2 VAGINA;  Surgeon: Mariela Abdalla MD;  Location: [facility] OR     LASER CO2 VAGINA N/A 5/24/2019    Procedure: Exam under anesthesia, vaginal biopsies, CO2 laser of the vagina;  Surgeon: Lilliam Roy MD;  Location: [facility] OR         Health Maintenance Due   Topic Date Due     DEXA  1955     HEPATITIS C SCREENING  1955     ADVANCE CARE PLANNING  1955     COLONOSCOPY  07/02/1963     DTAP/TDAP/TD IMMUNIZATION (1 - Tdap) 07/02/1978     LIPID  07/02/1998     ZOSTER IMMUNIZATION (1 of 2) 07/02/2003     MEDICARE ANNUAL WELLNESS VISIT  07/02/2018     PNEUMOCOCCAL IMMUNIZATION 65+ HIGH/HIGHEST RISK (1 of 2 - PCV13) 07/02/2018     INFLUENZA VACCINE (1) 09/01/2019       Current Medications:     Current Outpatient Medications   Medication Sig Dispense Refill     acetaminophen (TYLENOL) 500 MG tablet Take 1,000 mg by mouth       amoxicillin-clavulanate (AUGMENTIN) 500-125 MG tablet Take 1 tablet by mouth 2 times daily 20 tablet 0     calcium Citrate-vitamin D (CALCET CREAMY BITES) 500-400 MG-UNIT CHEW Take 1 tablet by mouth       childrens multivitamin w/iron (FLINTSTONES COMPLETE) 60 MG chewable tablet Take 2 tablets by mouth       cholecalciferol (D-3-5) 5000 units CAPS Take 5000 mg 4 times a week       cyanocobalamin (CYANOCOBALAMIN) 1000 MCG/ML injection Inject 1,000 mcg into the muscle       hydrochlorothiazide  "(HYDRODIURIL) 25 MG tablet Take 25 mg by mouth       lidocaine-prilocaine (EMLA) 2.5-2.5 % external cream        metroNIDAZOLE (METROGEL) 0.75 % vaginal gel INSERT 1 APPLICATORFUL BY VAGINAL ROUTE AT BEDTIME FOR 5 DAYS.  0     Multiple Vitamin (MULTI-VITAMIN) per tablet Take 1 tablet by mouth daily.       NARCAN 4 MG/0.1ML nasal spray TK UTD PER PACKAGE INSERT  0     oxyCODONE IR (ROXICODONE) 10 MG tablet TK 1 T PO  QID PRF SEVERE PAIN RELIEF  0     oxyCODONE-Acetaminophen  MG TABS Take 1 tablet by mouth       Pediatric Multivit-Minerals-C ([employer] CHEWABLE CHILDRENS VITAMIN) CHEW TAKE 2 TABLETS BY MOUTH TWICE A DAY  3     phenazopyridine (PYRIDIUM) 200 MG tablet Take 1 tablet (200 mg) by mouth 3 times daily as needed for irritation 6 tablet 0     sertraline (ZOLOFT) 25 MG tablet Take 1 tablet by mouth daily.       zolpidem (AMBIEN) 10 MG tablet Take 10 mg by mouth       phenazopyridine (PYRIDIUM) 100 MG tablet TK 1 T PO  TID PRN P  2         Allergies:        Allergies   Allergen Reactions     Ibuprofen Nausea and Vomiting     Shrimp      Sulfa Drugs Rash        Social History:     Social History     Tobacco Use     Smoking status: Never Smoker     Smokeless tobacco: Never Used   Substance Use Topics     Alcohol use: No       History   Drug Use No         Family History:       Family History   Problem Relation Age of Onset     Heart Disease Father      Hypertension Sister          Physical Exam:     /78 (BP Location: Right arm)   Pulse 82   Temp 97.9  F (36.6  C) (Oral)   Resp 18   Ht 1.6 m (5' 2.99\")   Wt 100.1 kg (220 lb 9.6 oz)   SpO2 97%   BMI 39.09 kg/m    Body mass index is 39.09 kg/m .    General Appearance: healthy and alert, no distress     HEENT: no thyromegaly, no palpable nodules or masses        Cardiovascular: regular rate and rhythm, no gallops, rubs or murmurs     Respiratory: lungs clear, no rales, rhonchi or wheezes, normal diaphragmatic excursion    Musculoskeletal: extremities non " tender and without edema    Skin: no lesions or rashes     Neurological: normal gait, no gross defects     Psychiatric: appropriate mood and affect                               Hematological: normal cervical, supraclavicular and inguinal lymph nodes     Gastrointestinal:       abdomen soft, non-tender, non-distended, no organomegaly or masses    Genitourinary: External genitalia and urethral meatus appears normal.  Vagina is smooth without nodularity or masses, foreshortened. No blood or discharge in vaginal vault. No pain/tenderness with palpation at anterior vagina. Pap collected .      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   She is here today for an acute visit.    20 minutes were spent with this patient, over 50% of that time was spent in symptom management, treatment planning and in counseling and coordination of care.      Plan:     1.)       Will collect a pap/HPV today and follow up as needed. Given she has had at least 3 UTIs since June, will refer to Urology for further evaluation. Given her UA have had large blood and she is having flank pain, will evaluate for kidney stones. Encouraged to continue on her antibiotic until completion. She will otherwise return for her scheduled visit in December with Dr. Webb.      2.) Rx Pyridium x 2 days to coincide with antibiotic completion.     3.) Labs and/or tests ordered include:  CT ap. Urology.      4.) Health maintenance issues addressed today include annual health maintenance and non-gynecologic issues with PCP.    ERINN Win, WHNP-BC, ANP-BC  Women's Health Nurse Practitioner  Adult Nurse Pracitioner  Division of Gynecologic Oncology          CC  Patient Care Team:  Selisker, Helen, NP as PCP - General

## 2019-10-18 LAB
COPATH REPORT: ABNORMAL
PAP: ABNORMAL

## 2019-10-22 LAB
FINAL DIAGNOSIS: ABNORMAL
HPV HR 12 DNA CVX QL NAA+PROBE: POSITIVE
HPV16 DNA SPEC QL NAA+PROBE: NEGATIVE
HPV18 DNA SPEC QL NAA+PROBE: NEGATIVE
SPECIMEN DESCRIPTION: ABNORMAL
SPECIMEN SOURCE CVX/VAG CYTO: ABNORMAL

## 2020-01-03 ENCOUNTER — TELEPHONE (OUTPATIENT)
Dept: ONCOLOGY | Facility: CLINIC | Age: 65
End: 2020-01-03

## 2020-01-03 NOTE — TELEPHONE ENCOUNTER
Writer called patient to check in as she was a No-Show for Dr. Webb. There was no answer, writer left a message asking the patient to call the main scheduling line to reschedule or get in touch with our Triage nurse.     Virginia Stern LPN on 1/3/2020 at 1:22 PM

## 2020-02-19 ENCOUNTER — TELEPHONE (OUTPATIENT)
Dept: ONCOLOGY | Facility: CLINIC | Age: 65
End: 2020-02-19

## 2020-02-19 NOTE — TELEPHONE ENCOUNTER
This patient had no-show to her appt with Dr. Webb on 1/31.  Please try calling patient to see if we could help get her rescheduled, and also if she is interested in speaking with our  (not sure if she has troubles getting rides, or if there is anything we could do to help her make her appointments).     Thanks!  Nicholas     Attempted to contact patient. No answer and voicemail states it is full. Passed this information onto [facility].   Mabel Landaverde LPN on 2/19/2020 at 1:39 PM

## 2020-02-21 ENCOUNTER — PATIENT OUTREACH (OUTPATIENT)
Dept: ONCOLOGY | Facility: CLINIC | Age: 65
End: 2020-02-21

## 2020-02-21 NOTE — PROGRESS NOTES
[facility]:  Care Coordination Note    Second attempt made at placing telephone call to patient, to follow-up on her missed appointments with Dr. Webb on 1/3/20 and 1/31/20.  Patient did not answer, and voicemail box was full so writer unable to leave message for patient.    Shortly after attempting telephone call to patient, patient called writer back.  Discussed recent missed appointments with Dr. Webb, and asked if patient would like to reschedule.  Patient states that yes, she would like to reschedule her visit with Dr. Webb, and agreed to set up appointment for Friday next week 2/28/20 at 10:30 am.  Writer also offered to mail an appointment reminder letter to the patient, which patient said she would appreciate - also verified that we have patient's current address on file.    [facility] also [facility] to [facility] , with request to reach out to the patient to discuss any barriers that the patient may have that are preventing her from coming to her clinic appointments (i.e. transportation issues), as patient has had several no-show appointments over the past several months.    Nicholas Jarrell, RN, BSN, OCN  Oncology Care Coordinator  [facility]

## 2020-02-24 ENCOUNTER — PATIENT OUTREACH (OUTPATIENT)
Dept: CARE COORDINATION | Facility: CLINIC | Age: 65
End: 2020-02-24

## 2020-02-24 NOTE — PROGRESS NOTES
"Social Work Note: Telephone Call  Oncology Clinic    Data/Intervention:  Patient Name:  Alis Hartman  /Age:  1955 (64 year old)    Call From:  JACQUELINE  Reason for Call:  History of missed visits    Assessment:  JACQUELINE received referral to call Aranza about any resource needs impacting her ability to get into her appointments. JACQUELINE called and introduced self and purpose of call. Aranza was pleasant on the phone, she reported that \"it was just a transportation issue.\" JACQUELINE followed up on whether that was still a concern, but Aranza states it is not and has been figured out. JACQUELINE asked if Aranza was aware that MA came with a transportation benefit, she sounded surprised and states she didn't know that. JACQUELINE explained the process and encouraged Aranza to use this resource if she has any ongoing transportation needs. JACQUELINE did explain that this would need to be done several days in advance, so Aranza was encouraged to get started with that process this afternoon or tomorrow if she may need it for Friday. Aranza agreed. She asked to confirm appointment time for Friday, which jacqueline did for her.     Plan:  JACQUELINE will remain available as needed and appropriate.     WILLIAM Casas, [facility]  Clinical , Adult Oncology  Pager: [phone]  Phone: [phone]      "

## 2020-02-28 ENCOUNTER — ONCOLOGY VISIT (OUTPATIENT)
Dept: ONCOLOGY | Facility: CLINIC | Age: 65
End: 2020-02-28
Payer: MEDICARE

## 2020-02-28 VITALS
HEIGHT: 63 IN | TEMPERATURE: 98.3 F | OXYGEN SATURATION: 98 % | WEIGHT: 217.7 LBS | RESPIRATION RATE: 16 BRPM | BODY MASS INDEX: 38.57 KG/M2 | HEART RATE: 70 BPM | DIASTOLIC BLOOD PRESSURE: 83 MMHG | SYSTOLIC BLOOD PRESSURE: 138 MMHG

## 2020-02-28 DIAGNOSIS — R82.90 NONSPECIFIC FINDING ON EXAMINATION OF URINE: ICD-10-CM

## 2020-02-28 DIAGNOSIS — C53.9 MALIGNANT NEOPLASM OF CERVIX, UNSPECIFIED SITE (H): Primary | ICD-10-CM

## 2020-02-28 DIAGNOSIS — N30.01 ACUTE CYSTITIS WITH HEMATURIA: Primary | ICD-10-CM

## 2020-02-28 DIAGNOSIS — D07.2 VAIN III (VAGINAL INTRAEPITHELIAL NEOPLASIA III): ICD-10-CM

## 2020-02-28 DIAGNOSIS — R30.0 DYSURIA: ICD-10-CM

## 2020-02-28 LAB
ALBUMIN UR-MCNC: 10 MG/DL
APPEARANCE UR: ABNORMAL
BACTERIA #/AREA URNS HPF: ABNORMAL /HPF
BILIRUB UR QL STRIP: NEGATIVE
COLOR UR AUTO: YELLOW
GLUCOSE UR STRIP-MCNC: NEGATIVE MG/DL
HGB UR QL STRIP: ABNORMAL
KETONES UR STRIP-MCNC: NEGATIVE MG/DL
LEUKOCYTE ESTERASE UR QL STRIP: ABNORMAL
NITRATE UR QL: POSITIVE
NON-SQ EPI CELLS #/AREA URNS LPF: ABNORMAL /LPF
PH UR STRIP: 5.5 PH (ref 5–7)
RBC #/AREA URNS AUTO: ABNORMAL /HPF
SOURCE: ABNORMAL
SP GR UR STRIP: 1.02 (ref 1–1.03)
UROBILINOGEN UR STRIP-MCNC: NORMAL MG/DL (ref 0–2)
WBC #/AREA URNS AUTO: >100 /HPF
WBC CLUMPS #/AREA URNS HPF: PRESENT /HPF

## 2020-02-28 PROCEDURE — 57100 BIOPSY VAGINAL MUCOSA SIMPLE: CPT | Performed by: OBSTETRICS & GYNECOLOGY

## 2020-02-28 PROCEDURE — 88305 TISSUE EXAM BY PATHOLOGIST: CPT | Performed by: OBSTETRICS & GYNECOLOGY

## 2020-02-28 PROCEDURE — 87086 URINE CULTURE/COLONY COUNT: CPT | Performed by: OBSTETRICS & GYNECOLOGY

## 2020-02-28 PROCEDURE — 99214 OFFICE O/P EST MOD 30 MIN: CPT | Mod: 25 | Performed by: OBSTETRICS & GYNECOLOGY

## 2020-02-28 PROCEDURE — 81001 URINALYSIS AUTO W/SCOPE: CPT | Performed by: OBSTETRICS & GYNECOLOGY

## 2020-02-28 PROCEDURE — 87088 URINE BACTERIA CULTURE: CPT | Performed by: OBSTETRICS & GYNECOLOGY

## 2020-02-28 PROCEDURE — 87186 SC STD MICRODIL/AGAR DIL: CPT | Performed by: OBSTETRICS & GYNECOLOGY

## 2020-02-28 RX ORDER — NITROFURANTOIN 25; 75 MG/1; MG/1
100 CAPSULE ORAL 2 TIMES DAILY
Qty: 6 CAPSULE | Refills: 0 | Status: SHIPPED | OUTPATIENT
Start: 2020-02-28 | End: 2021-01-19

## 2020-02-28 RX ORDER — PHENAZOPYRIDINE HYDROCHLORIDE 100 MG/1
100 TABLET, FILM COATED ORAL 3 TIMES DAILY PRN
Qty: 60 TABLET | Refills: 2 | Status: SHIPPED | OUTPATIENT
Start: 2020-02-28 | End: 2021-01-18

## 2020-02-28 ASSESSMENT — PAIN SCALES - GENERAL: PAINLEVEL: WORST PAIN (10)

## 2020-02-28 ASSESSMENT — MIFFLIN-ST. JEOR: SCORE: 1496.61

## 2020-02-28 NOTE — PROGRESS NOTES
Consult Notes on Referred Patient        Dr. Maria Fernanda Eckert  [facility]  [address]  [place] [address]       RE: Alis Hartman  : 1955  SHERMAN: 2020     HPI:  Alis Hartman is 64 year old with cervical cancer and VAIN3.  She is unaccompanied today.  She notes she has had vaginal pain and dysuria lately.  She also notes some light vaginal spotting and blood in her urine lately.    Cancer Course:    She reports she has a history of cervical cancer sometime in the , however she is unsure exactly when (biopsy showing 1996 in Care Everywhere).  At that time she did not have a hysterectomy but was treated with primary radiation therapy, unsure if she also had chemotherapy or not.  This treatment was at [facility] [place] and these records are unavailable.  Since that time she has intermittently followed with gynecologic oncology for paps and exams and has had a history of VAIN treated with CO2 laser in .  Recently she has had some vaginal bleeding and had another high grade pap smear in .  She also notes she has had persistent urinary symptoms since her radiation treatment and has had problems with recurrent UTI.  She feels she may have a UTI today and would like to be tested.    3/29/96:  Cervix biopsy:  Moderately differentiated squamous cell carcinoma-Dr Javi Morrow performed the biopsy    10/26/11:  EUA colposcopy and biopsy   Pathology: Cervix biopsy:  No dysplasia.  Uterus left apex:  Stroma hyalinization c/w irradiation.  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'clock    18:  Pap:  HSIL, HPV+    19:  Vaginal colposcopy, vaginal biopsy, CO2 laser of the vagina   Pathology:  VAIN3    10/14/19:  Pap:  HSIL, other HR HPV +      Review of Systems:  Systemic           no weight changes; no fever; no chills; no night sweats; no  appetite changes  Skin           no rashes, or lesions  Eye           no irritation; no changes in vision  Jamir-Laryngeal           no dysphagia; no hoarseness   Pulmonary    no cough; no shortness of breath  Cardiovascular    no chest pain; no palpitations  Gastrointestinal    no diarrhea; no constipation; no abdominal pain; no changes in bowel  habits; no blood in stool  Genitourinary   + urinary frequency; + urinary urgency; no dysuria; no pain; no abnormal vaginal discharge; no abnormal vaginal bleeding  Breast    no breast discharge; no breast changes; no breast pain  Musculoskeletal    no myalgias; no arthralgias; + back pain; no joint pain  Psychiatric           no depressed mood; no anxiety    Hematologic            no tender lymph nodes; no noticeable swellings or lumps   Endocrine    no hot flashes; no heat/cold intolerance         Neurological   no tremor; no numbness and tingling; no headaches; no difficulty sleeping    Obstetrics and Gynecology History:  ,  x 2  No HRT      Past Medical History:  Past Medical History:   Diagnosis Date     Depression      Hypertension      Malignant neoplasm of endocervix (H)     Tx with radiation     Urinary incontinence        Past Surgical History:  Past Surgical History:   Procedure Laterality Date     ABDOMINOPLASTY       BIOPSY CERVICAL, LOCAL EXCISION, SINGLE/MULTIPLE  10/26/2011    Procedure:BIOPSY CERVICAL, LOCAL EXCISION, SINGLE/MULTIPLE; EUA, cervical biopsies; Surgeon:BETTY TINEO; Location: OR     GI SURGERY      gastric bypass     LASER CO2 VAGINA N/A 3/2/2015    Procedure: LASER CO2 VAGINA;  Surgeon: Mariela Abdalla MD;  Location:  OR     LASER CO2 VAGINA N/A 2019    Procedure: Exam under anesthesia, vaginal biopsies, CO2 laser of the vagina;  Surgeon: Lilliam Roy MD;  Location:  OR       Health Maintenance:  Last Pap Smear: See HPI due to history of cervical cancer    Last Mammogram: 19              Result: normal      She has not had a history of abnormal mammograms.    Last Colonoscopy: Never      Current Medications:   has a current medication list which includes the following prescription(s): acetaminophen, calcium citrate-vitamin d, childrens multivitamin w/iron, cholecalciferol, hydrochlorothiazide, lidocaine-prilocaine, multi-vitamin, narcan, phenazopyridine, sertraline, zolpidem, and nitrofurantoin macrocrystal-monohydrate.     Allergies:   Ibuprofen; Shrimp; and Sulfa drugs      Social History:  Social History     Socioeconomic History     Marital status: Single     Spouse name: Not on file     Number of children: Not on file     Years of education: Not on file     Highest education level: Not on file   Occupational History     Not on file   Social Needs     Financial resource strain: Not on file     Food insecurity:     Worry: Not on file     Inability: Not on file     Transportation needs:     Medical: Not on file     Non-medical: Not on file   Tobacco Use     Smoking status: Never Smoker     Smokeless tobacco: Never Used   Substance and Sexual Activity     Alcohol use: No     Drug use: No     Sexual activity: Not on file   Lifestyle     Physical activity:     Days per week: Not on file     Minutes per session: Not on file     Stress: Not on file   Relationships     Social connections:     Talks on phone: Not on file     Gets together: Not on file     Attends Yazidi service: Not on file     Active member of club or organization: Not on file     Attends meetings of clubs or organizations: Not on file     Relationship status: Not on file     Intimate partner violence:     Fear of current or ex partner: Not on file     Emotionally abused: Not on file     Physically abused: Not on file     Forced sexual activity: Not on file   Other Topics Concern     Parent/sibling w/ CABG, MI or angioplasty before 65F 55M? Not Asked   Social History Narrative     Not on file       Lives alone, feels safe at home.   "On disability for mental illness.  Enjoys spending time with grandchildren.  Does not have an advanced directive on file and would like her son, Hemanth and sister Rebecca Landeros to be her POA.  Full code.    Family History:   The patient's family history is significant for.  Family History   Problem Relation Age of Onset     Heart Disease Father      Hypertension Sister          Physical Exam:   /83 (BP Location: Right arm)   Pulse 70   Temp 98.3  F (36.8  C) (Oral)   Resp 16   Ht 1.6 m (5' 3\")   Wt 98.7 kg (217 lb 11.2 oz)   SpO2 98%   BMI 38.56 kg/m    Body mass index is 38.56 kg/m .    General Appearance: healthy and alert, no distress     HEENT:  no thyromegaly, no palpable nodules or masses        Cardiovascular: regular rate and rhythm, no gallops, rubs or murmurs     Respiratory: lungs clear, no rales, rhonchi or wheezes, normal diaphragmatic excursion    Musculoskeletal: extremities non tender and without edema    Skin: no lesions or rashes     Neurological: normal gait, no gross defects     Psychiatric: appropriate mood and affect                               Hematological: normal cervical, supraclavicular and inguinal lymph nodes     Gastrointestinal:       abdomen soft, non-tender, non-distended, no organomegaly or masses    Genitourinary: External genitalia and urethral meatus appears normal.  Vagina is smooth without nodularity or masses.  Cervix is unable to be distinguished from the vagina due to radiation changes.  The anterior vagina is pale from 9 o'clock extending across the midline to 3 o'clock.  Bimanual exam reveal no masses, nodularity or fullness.  Recto-vaginal exam confirms these findings.    Procedure Note:  Consent was obtained.  Speculum was placed and the vagina/cervix was coated with acetic acid.  There were areas of punctation and vascularity along the entire vaginal cuff.  A biopsy was taken with the Tischler forceps from both sides of the vaginal apex.  Hemostasis was " achieved with application of pressure.  Patient tolerated the procedure well.      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     A total of 30 minutes was spent with the patient, >50% of which were spent in counseling the patient and/or treatment planning, this was separate from the 5 minutes spent on post-operative cares.      Plan:     1.)    HSIL in the setting of a history of cervical cancer and vaginal dysplasia.  We reviewed her history in detail.  We discussed her colposcopic impression which was high grade dysplasia.  Biopsies were taken today and I will call her with these results.  If high grade dysplasia is discovered, I will recommend EUA with CO2 laser of the dysplastic areas.     2.) Genetic risk factors were assessed and the patient does not meet the qualifications for a referral.      3.) Labs and/or tests ordered include:  Vaginal biopsies.     4.) Health maintenance issues addressed today include colonoscopy which will be addressed following treatment for her HSIL.    5.) Dysuria-UA today.  Script for pyridium refilled today          Thank you for allowing us to participate in the care of your patient.         Sincerely,    Lilliam Webb MD  Gynecologic Oncology  [facility]       OPAL JOHNSON

## 2020-02-28 NOTE — NURSING NOTE
"Oncology Rooming Note    February 28, 2020 10:08 AM   Alis Hartman is a 64 year old female who presents for:    Chief Complaint   Patient presents with     Oncology Clinic Visit     6 month follow up     Initial Vitals: /83 (BP Location: Right arm)   Pulse 70   Temp 98.3  F (36.8  C) (Oral)   Resp 16   Ht 1.6 m (5' 3\")   Wt 98.7 kg (217 lb 11.2 oz)   SpO2 98%   BMI 38.56 kg/m   Estimated body mass index is 38.56 kg/m  as calculated from the following:    Height as of this encounter: 1.6 m (5' 3\").    Weight as of this encounter: 98.7 kg (217 lb 11.2 oz). Body surface area is 2.09 meters squared.  Worst Pain (10) Comment: Data Unavailable   No LMP recorded. Patient is postmenopausal.  Allergies reviewed: Yes  Medications reviewed: Yes    Medications: MEDICATION REFILLS NEEDED TODAY. Provider was notified.  Pharmacy name entered into [facility]:    [id number] - [place] - [address] [facility]  [id number] [place] - [address] [id number] - [place] - [address] [facility] AT [address] LPN              "

## 2020-02-28 NOTE — LETTER
2020         RE: Alis Hartman  [address]  [place] [address]        Dear Colleague,    Thank you for referring your patient, Alis Hartman, to the [facility]. Please see a copy of my visit note below.    Consult Notes on Referred Patient        Dr. 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  She is unaccompanied today.  She notes she has had vaginal pain and dysuria lately.  She also notes some light vaginal spotting and blood in her urine lately.    Cancer Course:    She reports she has a history of cervical cancer sometime in the , however she is unsure exactly when (biopsy showing 1996 in Care Everywhere).  At that time she did not have a hysterectomy but was treated with primary radiation therapy, unsure if she also had chemotherapy or not.  This treatment was at [facility] [place] and these records are unavailable.  Since that time she has intermittently followed with gynecologic oncology for paps and exams and has had a history of VAIN treated with CO2 laser in .  Recently she has had some vaginal bleeding and had another high grade pap smear in .  She also notes she has had persistent urinary symptoms since her radiation treatment and has had problems with recurrent UTI.  She feels she may have a UTI today and would like to be tested.    3/29/96:  Cervix biopsy:  Moderately differentiated squamous cell carcinoma-Dr aJvi Morrow performed the biopsy    10/26/11:  EUA colposcopy and biopsy   Pathology: Cervix biopsy:  No dysplasia.  Uterus left apex:  Stroma hyalinization c/w irradiation.  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'clock    18:   Pap:  HSIL, HPV+    19:  Vaginal colposcopy, vaginal biopsy, CO2 laser of the vagina   Pathology:  VAIN3    10/14/19:  Pap:  HSIL, other HR HPV +      Review of Systems:  Systemic           no weight changes; no fever; no chills; no night sweats; no appetite changes  Skin           no rashes, or lesions  Eye           no irritation; no changes in vision  Jamir-Laryngeal           no dysphagia; no hoarseness   Pulmonary    no cough; no shortness of breath  Cardiovascular    no chest pain; no palpitations  Gastrointestinal    no diarrhea; no constipation; no abdominal pain; no changes in bowel  habits; no blood in stool  Genitourinary   + urinary frequency; + urinary urgency; no dysuria; no pain; no abnormal vaginal discharge; no abnormal vaginal bleeding  Breast    no breast discharge; no breast changes; no breast pain  Musculoskeletal    no myalgias; no arthralgias; + back pain; no joint pain  Psychiatric           no depressed mood; no anxiety    Hematologic            no tender lymph nodes; no noticeable swellings or lumps   Endocrine    no hot flashes; no heat/cold intolerance         Neurological   no tremor; no numbness and tingling; no headaches; no difficulty sleeping    Obstetrics and Gynecology History:  ,  x 2  No HRT      Past Medical History:  Past Medical History:   Diagnosis Date     Depression      Hypertension      Malignant neoplasm of endocervix (H)     Tx with radiation     Urinary incontinence        Past Surgical History:  Past Surgical History:   Procedure Laterality Date     ABDOMINOPLASTY       BIOPSY CERVICAL, LOCAL EXCISION, SINGLE/MULTIPLE  10/26/2011    Procedure:BIOPSY CERVICAL, LOCAL EXCISION, SINGLE/MULTIPLE; EUA, cervical biopsies; Surgeon:BETTY TINEO; Location: OR     GI SURGERY      gastric bypass     LASER CO2 VAGINA N/A 3/2/2015    Procedure: LASER CO2 VAGINA;  Surgeon: Mariela Abdalla MD;  Location:  OR     LASER CO2 VAGINA N/A 2019     Procedure: Exam under anesthesia, vaginal biopsies, CO2 laser of the vagina;  Surgeon: Lilliam Roy MD;  Location:  OR       Health Maintenance:  Last Pap Smear: See HPI due to history of cervical cancer    Last Mammogram: 1/23/19             Result: normal      She has not had a history of abnormal mammograms.    Last Colonoscopy: Never      Current Medications:   has a current medication list which includes the following prescription(s): acetaminophen, calcium citrate-vitamin d, childrens multivitamin w/iron, cholecalciferol, hydrochlorothiazide, lidocaine-prilocaine, multi-vitamin, narcan, phenazopyridine, sertraline, zolpidem, and nitrofurantoin macrocrystal-monohydrate.     Allergies:   Ibuprofen; Shrimp; and Sulfa drugs      Social History:  Social History     Socioeconomic History     Marital status: Single     Spouse name: Not on file     Number of children: Not on file     Years of education: Not on file     Highest education level: Not on file   Occupational History     Not on file   Social Needs     Financial resource strain: Not on file     Food insecurity:     Worry: Not on file     Inability: Not on file     Transportation needs:     Medical: Not on file     Non-medical: Not on file   Tobacco Use     Smoking status: Never Smoker     Smokeless tobacco: Never Used   Substance and Sexual Activity     Alcohol use: No     Drug use: No     Sexual activity: Not on file   Lifestyle     Physical activity:     Days per week: Not on file     Minutes per session: Not on file     Stress: Not on file   Relationships     Social connections:     Talks on phone: Not on file     Gets together: Not on file     Attends Alevism service: Not on file     Active member of club or organization: Not on file     Attends meetings of clubs or organizations: Not on file     Relationship status: Not on file     Intimate partner violence:     Fear of current or ex partner: Not on file     Emotionally abused: Not on  "file     Physically abused: Not on file     Forced sexual activity: Not on file   Other Topics Concern     Parent/sibling w/ CABG, MI or angioplasty before 65F 55M? Not Asked   Social History Narrative     Not on file       Lives alone, feels safe at home.  On disability for mental illness.  Enjoys spending time with grandchildren.  Does not have an advanced directive on file and would like her son, Hemanth and sister Rebecca Landeros to be her POA.  Full code.    Family History:   The patient's family history is significant for.  Family History   Problem Relation Age of Onset     Heart Disease Father      Hypertension Sister          Physical Exam:   /83 (BP Location: Right arm)   Pulse 70   Temp 98.3  F (36.8  C) (Oral)   Resp 16   Ht 1.6 m (5' 3\")   Wt 98.7 kg (217 lb 11.2 oz)   SpO2 98%   BMI 38.56 kg/m     Body mass index is 38.56 kg/m .    General Appearance: healthy and alert, no distress     HEENT:  no thyromegaly, no palpable nodules or masses        Cardiovascular: regular rate and rhythm, no gallops, rubs or murmurs     Respiratory: lungs clear, no rales, rhonchi or wheezes, normal diaphragmatic excursion    Musculoskeletal: extremities non tender and without edema    Skin: no lesions or rashes     Neurological: normal gait, no gross defects     Psychiatric: appropriate mood and affect                               Hematological: normal cervical, supraclavicular and inguinal lymph nodes     Gastrointestinal:       abdomen soft, non-tender, non-distended, no organomegaly or masses    Genitourinary: External genitalia and urethral meatus appears normal.  Vagina is smooth without nodularity or masses.  Cervix is unable to be distinguished from the vagina due to radiation changes.  The anterior vagina is pale from 9 o'clock extending across the midline to 3 o'clock.  Bimanual exam reveal no masses, nodularity or fullness.  Recto-vaginal exam confirms these findings.    Procedure Note:  Consent was " obtained.  Speculum was placed and the vagina/cervix was coated with acetic acid.  There were areas of punctation and vascularity along the entire vaginal cuff.  A biopsy was taken with the Tischler forceps from both sides of the vaginal apex.  Hemostasis was achieved with application of pressure.  Patient tolerated the procedure well.      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     A total of 30 minutes was spent with the patient, >50% of which were spent in counseling the patient and/or treatment planning, this was separate from the 5 minutes spent on post-operative cares.      Plan:     1.)    HSIL in the setting of a history of cervical cancer and vaginal dysplasia.  We reviewed her history in detail.  We discussed her colposcopic impression which was high grade dysplasia.  Biopsies were taken today and I will call her with these results.  If high grade dysplasia is discovered, I will recommend EUA with CO2 laser of the dysplastic areas.     2.) Genetic risk factors were assessed and the patient does not meet the qualifications for a referral.      3.) Labs and/or tests ordered include:  Vaginal biopsies.     4.) Health maintenance issues addressed today include colonoscopy which will be addressed following treatment for her HSIL.    5.) Dysuria-UA today.  Script for pyridium refilled today          Thank you for allowing us to participate in the care of your patient.         Sincerely,    Lilliam Webb MD  Gynecologic Oncology  [facility]       OPAL JOHNSON

## 2020-03-01 ENCOUNTER — TELEPHONE (OUTPATIENT)
Dept: ONCOLOGY | Facility: CLINIC | Age: 65
End: 2020-03-01

## 2020-03-01 ENCOUNTER — NURSE TRIAGE (OUTPATIENT)
Dept: NURSING | Facility: CLINIC | Age: 65
End: 2020-03-01

## 2020-03-01 LAB
BACTERIA SPEC CULT: ABNORMAL
BACTERIA SPEC CULT: ABNORMAL
SPECIMEN SOURCE: ABNORMAL

## 2020-03-01 NOTE — TELEPHONE ENCOUNTER
"Alis requests refill of Macrobid for UTI.       Disp Refills Start End JESSICA   nitroFURantoin macrocrystal-monohydrate (MACROBID) 100 MG capsule 6 capsule 0 2/28/2020  --   Sig - Route: Take 1 capsule (100 mg) by mouth 2 times daily - Oral     Reports that she continues to have dysuria - \"irritated throbbing sensation.\" Patient is also on Pyridium.    PCP Jon Tavarez     Per protocol, advised to call clinic within 24 hours. Care advice reviewed. Patient verbalizes understanding. Advised to call back with further questions/concerns.     Flor Rodriguez RN/[place] Nurse Advisor        Reason for Disposition    [1] Taking antibiotic > 72 hours (3 days) for UTI AND [2] painful urination or frequency not improved    Additional Information    Negative: Shock suspected (e.g., cold/pale/clammy skin, too weak to stand, low BP, rapid pulse)    Negative: Sounds like a life-threatening emergency to the triager    Negative: [1] Unable to urinate (or only a few drops) > 4 hours AND     [2] bladder feels very full (e.g., palpable bladder or strong urge to urinate)    Negative: Passing pure blood or large blood clots (i.e., size > a dime)  (Exceptions: flecks, small strands, or pinkish-red color)    Negative: Patient sounds very sick or weak to the triager    Negative: [1] SEVERE pain (e.g., excruciating) AND [2] no improvement 2 hours after pain medications    Negative: [1] Fever > 100.0 F (37.8 C) AND [2] new onset since starting antibiotics    Negative: [1] Side (flank) or lower back pain AND [2] new onset since starting antibiotics    Negative: [1] Taking antibiotic > 24 hours for UTI AND [2] flank or lower back pain worsening    Negative: [1] Vomiting 2 or more times AND [2] interferes with taking oral antibiotic    Negative: [1] Taking antibiotic > 24 hours for UTI (urinary tract or bladder infection) AND [2] fever persists    Protocols used: URINARY TRACT INFECTION ON ANTIBIOTIC FOLLOW-UP CALL - FEMALE-A-JUDD      "

## 2020-03-01 NOTE — TELEPHONE ENCOUNTER
"Clinic Action Needed: Yes, follow up  [facility] Triage Call  Presenting Problem:    Alis requests refill of Macrobid for UTI.       Disp Refills Start End JESSICA   nitroFURantoin macrocrystal-monohydrate (MACROBID) 100 MG capsule 6 capsule 0 2/28/2020  --   Sig - Route: Take 1 capsule (100 mg) by mouth 2 times daily - Oral     Reports that she continues to have dysuria - \"irritated throbbing sensation.\" Patient is also on Pyridium.    PCP Jon Tavarez     Per protocol, advised to call clinic within 24 hours. Care advice reviewed. Patient verbalizes understanding. Advised to call back with further questions/concerns.     Flor Rodriguez RN/[place] Nurse Advisor    Guideline Used: URINARY TRACT INFECTION ON ANTIBIOTIC FOLLOW-UP CALL - FEMALE-A-    Routed to:RN pool    Please be sure to close this encounter once this patient's issue/question has been addressed.        "

## 2020-03-04 ENCOUNTER — TELEPHONE (OUTPATIENT)
Dept: ONCOLOGY | Facility: CLINIC | Age: 65
End: 2020-03-04

## 2020-03-04 LAB — COPATH REPORT: NORMAL

## 2020-03-04 NOTE — TELEPHONE ENCOUNTER
"Telephone call placed to patient. States she continues to have pain \"down there\". Pain occurs at other times not just with urinating. Reports increased frequency and urgency. Urine volume unchanged.  States she can be sitting or standing. Pain is sometimes sharp, sometimes tingly. She is unsure how long it lasts, but states it is longer than 10 min. Rates pain 9/10. No fevers.   She is not sure if pain is from biopsy or UTI. States biopsy pain is usually improved after a few days. Although she states there are days she does not have pain.   Completed 3 days of antibiotic and continues to take pyridium. She would like a refill for pyridium.  Gypsy Cordero  RN, BSN, OCN    "

## 2020-03-05 ENCOUNTER — DOCUMENTATION ONLY (OUTPATIENT)
Dept: ONCOLOGY | Facility: CLINIC | Age: 65
End: 2020-03-05

## 2020-03-05 DIAGNOSIS — D07.2 VAIN III (VAGINAL INTRAEPITHELIAL NEOPLASIA III): Primary | ICD-10-CM

## 2020-03-05 DIAGNOSIS — C53.9 MALIGNANT NEOPLASM OF CERVIX, UNSPECIFIED SITE (H): ICD-10-CM

## 2020-03-05 RX ORDER — HEPARIN SODIUM 5000 [USP'U]/.5ML
5000 INJECTION, SOLUTION INTRAVENOUS; SUBCUTANEOUS
Status: CANCELLED | OUTPATIENT
Start: 2020-03-05

## 2020-03-05 NOTE — PROGRESS NOTES
Surgery is [place] with Dr. Webb [place] [facility]  Scheduled per MD.    H&P: to be completed.  POST-OP: Will be scheduled by patient/clinic.    The RN completed the education regarding the surgery.     The surgery packet was provided that contains surgical instructions and a map.     They are aware that they will receive a call  ~2 days prior to the scheduled procedure and will be given an exact arrival/start time.    Per communication - I did not need to call the patient to provide any extra additional information. I will follow up if anything changes.

## 2020-03-09 ENCOUNTER — PATIENT OUTREACH (OUTPATIENT)
Dept: ONCOLOGY | Facility: CLINIC | Age: 65
End: 2020-03-09

## 2020-03-09 NOTE — PROGRESS NOTES
[facility]:  Care Coordination Note    Telephone call was placed to patient with details for her upcoming surgical procedure with Dr. Webb, which is scheduled for Thursday this week 3/12/20 at the [facility] [place] (Exam Under Anesthesia, CO2 Laser).  Reviewed date of the procedure, location/address, and NPO instructions.  Informed patient that she will receive a call from a surgical nurse 1-2 days prior to the procedure, to confirm the procedure time and what time she should plan to check in that day.  Patient verbalized understanding, and agrees with this plan.    Also provided update to patient that Dr. Webb has requested that patient undergo a pelvic MRI - preferably prior to the procedure this week, if possible.  Patient agrees with this plan, but requests to be scheduled in an open-sided MRI machine due to her claustrophobia issues.  Scheduling team is working on assisting patient with this appointment, and patient will be notified of appointment information once scheduled.     Nicholas Jarrell, RN, BSN, OCN  Oncology Care Coordinator  [facility]

## 2020-03-10 ENCOUNTER — TELEPHONE (OUTPATIENT)
Dept: ONCOLOGY | Facility: CLINIC | Age: 65
End: 2020-03-10

## 2020-03-10 NOTE — TELEPHONE ENCOUNTER
Per In basket request Dr Webb requested a Pelvic MRI to be scheduled prior to the patients surgery on 03/12/2020. This patient is scheduled at [facility] in [place] on 03/10/2020 at  2:45. The patient is aware of the appointment.

## 2020-03-11 ENCOUNTER — PATIENT OUTREACH (OUTPATIENT)
Dept: ONCOLOGY | Facility: CLINIC | Age: 65
End: 2020-03-11

## 2020-03-11 ENCOUNTER — ANESTHESIA EVENT (OUTPATIENT)
Dept: SURGERY | Facility: AMBULATORY SURGERY CENTER | Age: 65
End: 2020-03-11

## 2020-03-11 NOTE — PROGRESS NOTES
[facility]:  Care Coordination Note    Received telephone call from patient, calling with update regarding her MRI Pelvis that was scheduled with [facility] yesterday in their open-sided MRI machine.  Patient shares that she went to the appointment, but was unable to have the MRI done due to her claustrophobia.  Patient is requesting to have the MRI done under sedation.  Patient wonders if she should still have her procedure with Dr. Webb tomorrow as scheduled, or if this will need to be postponed until after she is able to have the MRI completed.    Dr. Webb was updated - Dr. Webb recommends having patient go forward with her procedure as-scheduled tomorrow, despite patient being unable to have the MRI done.  Dr. Webb is unaware of locations that offer MRI under sedation, but will plan to discuss this further with patient tomorrow when she sees her.      Patient was updated with Dr. Webb's recommendations via telephone, and she verbalized understanding.  Again provided date/time of procedure, location of procedure and address, and check-in time.       Nicholas Jarrell, RN, BSN, OCN  Oncology Care Coordinator  [facility]

## 2020-03-12 ENCOUNTER — HOSPITAL ENCOUNTER (OUTPATIENT)
Facility: AMBULATORY SURGERY CENTER | Age: 65
End: 2020-03-12
Attending: OBSTETRICS & GYNECOLOGY
Payer: MEDICARE

## 2020-03-12 ENCOUNTER — ANESTHESIA (OUTPATIENT)
Dept: SURGERY | Facility: AMBULATORY SURGERY CENTER | Age: 65
End: 2020-03-12

## 2020-03-12 VITALS
HEART RATE: 73 BPM | TEMPERATURE: 97.8 F | DIASTOLIC BLOOD PRESSURE: 71 MMHG | OXYGEN SATURATION: 100 % | SYSTOLIC BLOOD PRESSURE: 127 MMHG | RESPIRATION RATE: 14 BRPM

## 2020-03-12 DIAGNOSIS — D07.2 VAIN III (VAGINAL INTRAEPITHELIAL NEOPLASIA III): ICD-10-CM

## 2020-03-12 DIAGNOSIS — D07.2 VAIN III (VAGINAL INTRAEPITHELIAL NEOPLASIA III): Primary | ICD-10-CM

## 2020-03-12 PROCEDURE — 88305 TISSUE EXAM BY PATHOLOGIST: CPT | Performed by: OBSTETRICS & GYNECOLOGY

## 2020-03-12 RX ORDER — LIDOCAINE 40 MG/G
CREAM TOPICAL
Status: DISCONTINUED | OUTPATIENT
Start: 2020-03-12 | End: 2020-03-12 | Stop reason: HOSPADM

## 2020-03-12 RX ORDER — ONDANSETRON 2 MG/ML
INJECTION INTRAMUSCULAR; INTRAVENOUS PRN
Status: DISCONTINUED | OUTPATIENT
Start: 2020-03-12 | End: 2020-03-12

## 2020-03-12 RX ORDER — ACETAMINOPHEN 325 MG/1
650 TABLET ORAL
Status: DISCONTINUED | OUTPATIENT
Start: 2020-03-12 | End: 2020-03-13 | Stop reason: HOSPADM

## 2020-03-12 RX ORDER — ONDANSETRON 2 MG/ML
4 INJECTION INTRAMUSCULAR; INTRAVENOUS EVERY 30 MIN PRN
Status: DISCONTINUED | OUTPATIENT
Start: 2020-03-12 | End: 2020-03-13 | Stop reason: HOSPADM

## 2020-03-12 RX ORDER — OXYCODONE HYDROCHLORIDE 5 MG/1
5 TABLET ORAL EVERY 4 HOURS PRN
Status: DISCONTINUED | OUTPATIENT
Start: 2020-03-12 | End: 2020-03-13 | Stop reason: HOSPADM

## 2020-03-12 RX ORDER — ONDANSETRON 4 MG/1
4 TABLET, ORALLY DISINTEGRATING ORAL EVERY 30 MIN PRN
Status: DISCONTINUED | OUTPATIENT
Start: 2020-03-12 | End: 2020-03-13 | Stop reason: HOSPADM

## 2020-03-12 RX ORDER — ACETAMINOPHEN 325 MG/1
975 TABLET ORAL ONCE
Status: COMPLETED | OUTPATIENT
Start: 2020-03-12 | End: 2020-03-12

## 2020-03-12 RX ORDER — PROPOFOL 10 MG/ML
INJECTION, EMULSION INTRAVENOUS CONTINUOUS PRN
Status: DISCONTINUED | OUTPATIENT
Start: 2020-03-12 | End: 2020-03-12

## 2020-03-12 RX ORDER — SODIUM CHLORIDE, SODIUM LACTATE, POTASSIUM CHLORIDE, CALCIUM CHLORIDE 600; 310; 30; 20 MG/100ML; MG/100ML; MG/100ML; MG/100ML
INJECTION, SOLUTION INTRAVENOUS CONTINUOUS
Status: DISCONTINUED | OUTPATIENT
Start: 2020-03-12 | End: 2020-03-13 | Stop reason: HOSPADM

## 2020-03-12 RX ORDER — LORAZEPAM 1 MG/1
1 TABLET ORAL EVERY 6 HOURS PRN
Qty: 2 TABLET | Refills: 0 | Status: SHIPPED | OUTPATIENT
Start: 2020-03-12 | End: 2024-05-15

## 2020-03-12 RX ORDER — SODIUM CHLORIDE, SODIUM LACTATE, POTASSIUM CHLORIDE, CALCIUM CHLORIDE 600; 310; 30; 20 MG/100ML; MG/100ML; MG/100ML; MG/100ML
INJECTION, SOLUTION INTRAVENOUS CONTINUOUS
Status: DISCONTINUED | OUTPATIENT
Start: 2020-03-12 | End: 2020-03-12 | Stop reason: HOSPADM

## 2020-03-12 RX ORDER — HYDROXYZINE HYDROCHLORIDE 25 MG/1
25 TABLET, FILM COATED ORAL
Status: COMPLETED | OUTPATIENT
Start: 2020-03-12 | End: 2020-03-12

## 2020-03-12 RX ORDER — HEPARIN SODIUM 10000 [USP'U]/ML
5000 INJECTION, SOLUTION INTRAVENOUS; SUBCUTANEOUS
Status: COMPLETED | OUTPATIENT
Start: 2020-03-12 | End: 2020-03-12

## 2020-03-12 RX ORDER — ONDANSETRON 4 MG/1
4 TABLET, ORALLY DISINTEGRATING ORAL
Status: DISCONTINUED | OUTPATIENT
Start: 2020-03-12 | End: 2020-03-13 | Stop reason: HOSPADM

## 2020-03-12 RX ORDER — PROPOFOL 10 MG/ML
INJECTION, EMULSION INTRAVENOUS PRN
Status: DISCONTINUED | OUTPATIENT
Start: 2020-03-12 | End: 2020-03-12

## 2020-03-12 RX ORDER — METHOCARBAMOL 750 MG/1
750 TABLET, FILM COATED ORAL
Status: DISCONTINUED | OUTPATIENT
Start: 2020-03-12 | End: 2020-03-13 | Stop reason: HOSPADM

## 2020-03-12 RX ORDER — FENTANYL CITRATE 50 UG/ML
25-50 INJECTION, SOLUTION INTRAMUSCULAR; INTRAVENOUS EVERY 5 MIN PRN
Status: DISCONTINUED | OUTPATIENT
Start: 2020-03-12 | End: 2020-03-12 | Stop reason: HOSPADM

## 2020-03-12 RX ORDER — NALOXONE HYDROCHLORIDE 0.4 MG/ML
.1-.4 INJECTION, SOLUTION INTRAMUSCULAR; INTRAVENOUS; SUBCUTANEOUS
Status: DISCONTINUED | OUTPATIENT
Start: 2020-03-12 | End: 2020-03-13 | Stop reason: HOSPADM

## 2020-03-12 RX ORDER — ACETIC ACID 5 %
LIQUID (ML) MISCELLANEOUS PRN
Status: DISCONTINUED | OUTPATIENT
Start: 2020-03-12 | End: 2020-03-12 | Stop reason: HOSPADM

## 2020-03-12 RX ORDER — FENTANYL CITRATE 50 UG/ML
INJECTION, SOLUTION INTRAMUSCULAR; INTRAVENOUS PRN
Status: DISCONTINUED | OUTPATIENT
Start: 2020-03-12 | End: 2020-03-12

## 2020-03-12 RX ADMIN — OXYCODONE HYDROCHLORIDE 5 MG: 5 TABLET ORAL at 15:07

## 2020-03-12 RX ADMIN — ACETAMINOPHEN 975 MG: 325 TABLET ORAL at 11:43

## 2020-03-12 RX ADMIN — FENTANYL CITRATE 25 MCG: 50 INJECTION, SOLUTION INTRAMUSCULAR; INTRAVENOUS at 14:17

## 2020-03-12 RX ADMIN — HEPARIN SODIUM 5000 UNITS: 10000 INJECTION, SOLUTION INTRAVENOUS; SUBCUTANEOUS at 11:48

## 2020-03-12 RX ADMIN — ONDANSETRON 4 MG: 2 INJECTION INTRAMUSCULAR; INTRAVENOUS at 14:23

## 2020-03-12 RX ADMIN — HYDROXYZINE HYDROCHLORIDE 25 MG: 25 TABLET, FILM COATED ORAL at 15:28

## 2020-03-12 RX ADMIN — PROPOFOL 100 MCG/KG/MIN: 10 INJECTION, EMULSION INTRAVENOUS at 14:18

## 2020-03-12 RX ADMIN — PROPOFOL 30 MG: 10 INJECTION, EMULSION INTRAVENOUS at 14:21

## 2020-03-12 RX ADMIN — SODIUM CHLORIDE, SODIUM LACTATE, POTASSIUM CHLORIDE, CALCIUM CHLORIDE: 600; 310; 30; 20 INJECTION, SOLUTION INTRAVENOUS at 11:59

## 2020-03-12 NOTE — BRIEF OP NOTE
[facility]  Gynecologic Oncology  Brief Operative Note      Name: Alis Hartman  MRN: [id number]  : 1955  Date of surgery: 3/12/2020    Preoperative diagnosis:  - VAIN 3  - History of cervical cancer (sqamous cell) s/p radiation  - Vaginal spotting  - Vaginal pain    Postoperative diagnosis:  - Same as above    Procedure(s):  - Exam under anesthesia  - Vaginal biopsy  - CO2 laser fulguration of vagina    Surgeon: Lilliam Webb MD  Resident: Miah Neal MD MPH, PGY1    Anesthesia: MAC  EBL: 2cccc  Urine output: Urine not drained  IV fluids: 400cc crystalloid    Specimens:  - Right lateral vagina  - Midline vagina  - Left lateral vagina    Findings:   - Exam under anesthesia revealed normal external female genitalia and urethral meatus and smooth vaginal mucosa. Unable to differentiate cervix from vaginal apex due to changes from radiation therapy. Anterior vaginal apex hypopigmented. Application of acetic acid reveals acetowhite change at the vaginal apex, predominately on the anterior wall, extending from 9 to 3 o'clock.  - CO2 laser fulguration of vaginal apex. Laser setting: super pulse at 8 [address].    Complications: None apparent    Miah Neal MD MPH  OB/Gyn PGY-1  20 3:06 PM

## 2020-03-12 NOTE — DISCHARGE INSTRUCTIONS
[facility]  Home Care Following Anesthesia  For 24 hours after surgery:  1. Get plenty of rest.  A responsible adult must stay with you for at least 24 hours after you leave the surgery center.  2. Do not drive or use heavy equipment.  If you have weakness or tingling, don't drive or use heavy equipment until this feeling goes away.   3. Do not drink alcohol.   4. Avoid strenuous or risky activities.  Ask for help when climbing stairs.  5. You may feel lightheaded.  IF so, sit for a few minutes before standing.  Have someone help you get up.   6. If you have nausea (feel sick to your stomach): Drink only clear liquids such as apple juice, ginger ale, broth or 7-Up.  Rest may also help.  Be sure to drink enough fluids.  Move to a regular diet as you feel able.   7. You may have a slight fever.  Call the doctor if your fever is over 100 F (37.7 C) (taken under the tongue) or lasts longer than 24 hours.  8. You may have a dry mouth, a sore throat, muscle aches or trouble sleeping. These should go away after 24 hours.  9. Do not make important or legal decisions.               Tips for taking pain medications  To get the best pain relief possible, remember these points:    Take pain medications as directed, before pain becomes severe.    Pain medication can upset your stomach: taking it with food may help.    Constipation is a common side effect of pain medication. Drink plenty of  fluids.    Eat foods high in fiber. Take a stool softener if recommended by your doctor or pharmacist.    Do not drink alcohol, drive or operate machinery while taking pain medications.    Ask about other ways to control pain, such as with heat, ice or relaxation.    Tylenol/Acetaminophen Consumption  To help encourage the safe use of acetaminophen, the makers of TYLENOL  have lowered the maximum daily dose for single-ingredient Extra Strength TYLENOL  (acetaminophen) products sold in the [place] from 8  pills per day (4,000 mg) to 6 pills per day (3,000 mg). The dosing interval has also changed from 2 pills every 4-6 hours to 2 pills every 6 hours.    If you feel your pain relief is insufficient, you may take Tylenol/Acetaminophen in addition to your narcotic pain medication.     Be careful not to exceed 3,000 mg of Tylenol/Acetaminophen in a 24 hour period from all sources.    If you are taking extra strength Tylenol/acetaminophen (500 mg), the maximum dose is 6 tablets in 24 hours.    If you are taking regular strength acetaminophen (325 mg), the maximum dose is 9 tablets in 24 hours.    Call a doctor for any of the followin. Signs of infection (fever, growing tenderness at the surgery site, a large amount of drainage or bleeding, severe pain, foul-smelling drainage, redness, swelling).  2. It has been over 8 to 10 hours since surgery and you are still not able to urinate (pass water).  3. Headache for over 24 hours.  Your doctor is:       Dr. Saima Tavarez, Gynecologic Oncology: [phone]               Or dial [phone] and ask for the resident on call for:  Gynecologic Oncology  For emergency care, call the:  [facility]:  [phone] (TTY for hearing impaired: [phone])

## 2020-03-12 NOTE — OP NOTE
Gynecologic Oncology Operative Report    3/12/2020  Alis Hartman  [id number]    PREOPERATIVE DIAGNOSIS: History of cervical cancer, high grade pap smear    POSTOPERATIVE DIAGNOSIS: Same, final pathology pending    PROCEDURES: Exam under anesthesia, multiple vaginal biopsies, CO2 laser of the vagina and cervix    SURGEON: Lilliam Webb MD     ASSISTANTS:  Miah Neal MD, PGY-1.     ANESTHESIA: MAC    ESTIMATED BLOOD LOSS: 10 cc     INDICATIONS: Alis Hartman is a 64 year old who had cervical cancer in the 1990's.  She underwent chemoradiation therapy.  She has then had a longstanding history of VAIN3 treated several times with CO2 laser therapy.  most recently she had a high grade pap smear with positive HPV.  Following a thorough discussion of the risks, benefits, indications and alternatives she consented to the above procedure.    SPECIMENS:   1.  Vaginal biopsies    COMPLICATIONS: None.     CONDITION: Stable to PACU.     PROCEDURE:   Consent was reviewed with the patient in the preoperative setting and confirmed. She received heparin for venous thrombosis prevention. The patient was transferred to the operating room and placed in dorsal supine position. MAC was obtained in the usual manner without noted difficulties. The patient was then positioned onto Aly stirrups.  The patient was prepped and draped for the above-mentioned procedure.  Timeout was called at which point the patient's name, procedure and operative site was confirmed by the operative team.  Speculum was placed and acetic acid was applied to the entire vagina/cervix.  It is difficult to determine where the cervix is as it is flush with the vagina and there are significant radiation changes.  There were diffuse acetowhite changes noted along the entire vaginal cuff/cervix extending from midline anteriorly 2-3 cm.  Three separate biopsies were obtained from each lateral margin and one in the midline and sent for pathology.  This  entire area was treated with CO2 laser on 8Wt Superpulse setting.  Hemostasis was assured.  Speculum was removed.  Patient tolerated the procedure well and was taken to the recovery room in stable condition.    Sponge, lap and needle counts were reported as correct x2 and instrument count was correct x1.     Lilliam Webb MD  Gynecologic Oncology  [facility]

## 2020-03-12 NOTE — ANESTHESIA POSTPROCEDURE EVALUATION
Anesthesia POST Procedure Evaluation    Patient: Alis Hartman   MRN:     [id number] Gender:   female   Age:    64 year old :      1955        Preoperative Diagnosis: VAIN III (vaginal intraepithelial neoplasia III) [D07.2]   Procedure(s):  Exam under anesthsia, vaginal biopsies, possible CO2 laser of the vagina   Postop Comments: No value filed.     Anesthesia Type: MAC       Disposition: Outpatient   Postop Pain Control: Uneventful            Sign Out: Well controlled pain   PONV: No   Neuro/Psych: Uneventful            Sign Out: Acceptable/Baseline neuro status   Airway/Respiratory: Uneventful            Sign Out: Acceptable/Baseline resp. status   CV/Hemodynamics: Uneventful            Sign Out: Acceptable CV status   Other NRE: NONE   DID A NON-ROUTINE EVENT OCCUR? No         Last Anesthesia Record Vitals:  CRNA VITALS  3/12/2020 1429 - 3/12/2020 1529      3/12/2020             NIBP:  101/68    Pulse:  67    NIBP Mean:  81    SpO2:  96 %          Last PACU Vitals:  Vitals Value Taken Time   BP     Temp     Pulse     Resp     SpO2     Temp src Axillary 3/12/2020  2:47 PM   NIBP 101/68 3/12/2020  2:57 PM   Pulse 67 3/12/2020  2:57 PM   SpO2 96 % 3/12/2020  2:57 PM   Resp     Temp 35.6  C (96.1  F) 3/12/2020  2:53 PM   Ht Rate 70 3/12/2020  2:56 PM   Temp 2 0  C (32  F) 3/12/2020  2:53 PM         Electronically Signed By: Mode Hackett MD, 2020, 4:25 PM

## 2020-03-12 NOTE — ANESTHESIA CARE TRANSFER NOTE
Patient: Alis Hartman    Procedure(s):  Exam under anesthsia, vaginal biopsies, possible CO2 laser of the vagina    Diagnosis: VAIN III (vaginal intraepithelial neoplasia III) [D07.2]  Diagnosis Additional Information: No value filed.    Anesthesia Type:   MAC     Note:  Airway :Room Air  Patient transferred to:Phase II  Comments: 125/73  99% 97.9-64-18  Handoff Report: Identifed the Patient, Identified the Reponsible Provider, Reviewed the pertinent medical history, Discussed the surgical course, Reviewed Intra-OP anesthesia mangement and issues during anesthesia, Set expectations for post-procedure period and Allowed opportunity for questions and acknowledgement of understanding      Vitals: (Last set prior to Anesthesia Care Transfer)    CRNA VITALS  3/12/2020 1429 - 3/12/2020 1504      3/12/2020             NIBP:  101/68    Pulse:  67    NIBP Mean:  81    SpO2:  96 %                Electronically Signed By: ERINN Ireland CRNA  March 12, 2020  3:04 PM

## 2020-03-16 LAB — COPATH REPORT: NORMAL

## 2020-03-26 ENCOUNTER — PATIENT OUTREACH (OUTPATIENT)
Dept: ONCOLOGY | Facility: CLINIC | Age: 65
End: 2020-03-26

## 2020-03-26 NOTE — PROGRESS NOTES
[facility]:  Care Coordination Note    Writer placed telephone call to [facility], to follow up on patient's MRI Pelvis results from 3/24/20.  Per [facility] team, patient ended up rescheduling her MRI appointment to 3/31/20.  Message was sent to Dr. Webb with this update.  Writer will plan to follow-up with [facility] after 3/31 to check on results status at that time.     Nicholas Jarrell, RN, BSN, OCN  Oncology Care Coordinator  [facility]

## 2020-04-03 ENCOUNTER — PATIENT OUTREACH (OUTPATIENT)
Dept: ONCOLOGY | Facility: CLINIC | Age: 65
End: 2020-04-03

## 2020-04-03 NOTE — PROGRESS NOTES
[facility]:  Care Coordination Note    Writer placed telephone call to [facility], to follow-up on MRI pelvis results from patient's appointment there on 3/31/20.  Per [facility] staff, patient was a no-show to this appointment.      Telephone call was then placed to patient to follow-up on her missed appointment.  Patient did not answer at this time, but writer was able to leave voicemail message on patient's phone.  Asked patient to return call to discuss rescheduling her MRI appointment.  Provided patient with call back numbers for this RN, our clinic scheduling team, and also the direct number for [facility] should patient wish to schedule her own appointment.    If no call back is received by patient, writer will plan to attempt following up with patient via telephone again early next week.    Note routed to Dr. Webb with update.     Nicholas Jarrell, RN, BSN, OCN  Oncology Care Coordinator  [facility]

## 2020-05-01 ENCOUNTER — TELEPHONE (OUTPATIENT)
Dept: ONCOLOGY | Facility: CLINIC | Age: 65
End: 2020-05-01

## 2020-05-01 NOTE — TELEPHONE ENCOUNTER
I have left the patient 3 messages to schedule the MRI. She does not answer the phone and does not return my calls to schedule.    Thanks    Arlene RAMOS

## 2020-07-01 ENCOUNTER — DOCUMENTATION ONLY (OUTPATIENT)
Dept: ONCOLOGY | Facility: CLINIC | Age: 65
End: 2020-07-01

## 2020-07-01 NOTE — PROGRESS NOTES
Noted patient has still not returned telephone calls to schedule her MRI that was recommended by Dr. Webb.  A total of 4 telephone calls were placed to patient, and 4 voicemail messages were left on the patient's phone asking her to return telephone call to schedule.  Patient has not returned calls as of yet.  Dr. Webb was notified, and requested that a certified no-show letter be sent to the patient.  Letter drafted and will be sent to patient via certified mail.    Nicholas Jarrell RN, BSN, OCN

## 2020-08-24 PROBLEM — Z53.9 NO SHOW: Status: ACTIVE | Noted: 2020-08-24

## 2020-09-30 ENCOUNTER — PATIENT OUTREACH (OUTPATIENT)
Dept: CARE COORDINATION | Facility: CLINIC | Age: 65
End: 2020-09-30

## 2020-09-30 NOTE — PROGRESS NOTES
Social Work Note: Telephone Call  Oncology Clinic    Data/Intervention:  Patient Name:  Alis Hartman  /Age:  1955 (65 year old)    Call From:  BANDAR  Reason for Call:  Missed visits    Assessment:  SW received referral that Aranza has missed several appointments. SW called and left message reintroducing self and explaining purpose of call. SW offered support and resources for any challenges that may be impacting her ability to get to her appointments. SW requested call back to discuss.     Plan:  BANDAR will await call back.     WILLIAM Casas, [facility]  Clinical , Adult Oncology  Phone: [phone]

## 2020-11-25 ENCOUNTER — TELEPHONE (OUTPATIENT)
Dept: ONCOLOGY | Facility: CLINIC | Age: 65
End: 2020-11-25
Payer: COMMERCIAL

## 2020-11-25 NOTE — TELEPHONE ENCOUNTER
Writer jean claude for patient to return call and change from a.m. to p.m. with Dr. Rosario on 12/10/2020 at either 2 pr 3 p.m.    [facility]  Cancer Center   [phone]

## 2020-11-26 ENCOUNTER — NURSE TRIAGE (OUTPATIENT)
Dept: NURSING | Facility: CLINIC | Age: 65
End: 2020-11-26

## 2020-11-26 NOTE — TELEPHONE ENCOUNTER
I checked appointments and found she has one on December 10th. She has no further questions.  Joanne Smith RN  [facility]    Additional Information    Negative: [1] Caller is not with the adult (patient) AND [2] reporting urgent symptoms    Negative: Lab result questions    Negative: Medication questions    Negative: Caller can't be reached by phone    Negative: Caller has already spoken to PCP or another triager    Negative: RN needs further essential information from caller in order to complete triage    Negative: Requesting regular office appointment    Negative: [1] Caller requesting NON-URGENT health information AND [2] PCP's office is the best resource    Health Information question, no triage required and triager able to answer question    Protocols used: INFORMATION ONLY CALL-A-AH

## 2020-12-30 ENCOUNTER — DOCUMENTATION ONLY (OUTPATIENT)
Dept: ONCOLOGY | Facility: CLINIC | Age: 65
End: 2020-12-30

## 2020-12-30 NOTE — PROGRESS NOTES
A certified letter was sent to this patient and has been returned to us unopened on 12/12/2020.-cap

## 2021-01-18 ENCOUNTER — ONCOLOGY VISIT (OUTPATIENT)
Dept: ONCOLOGY | Facility: CLINIC | Age: 66
End: 2021-01-18
Payer: MEDICARE

## 2021-01-18 VITALS
SYSTOLIC BLOOD PRESSURE: 142 MMHG | BODY MASS INDEX: 39.02 KG/M2 | TEMPERATURE: 98.2 F | HEART RATE: 74 BPM | DIASTOLIC BLOOD PRESSURE: 89 MMHG | OXYGEN SATURATION: 100 % | HEIGHT: 63 IN | WEIGHT: 220.2 LBS

## 2021-01-18 DIAGNOSIS — R82.90 NONSPECIFIC FINDING ON EXAMINATION OF URINE: ICD-10-CM

## 2021-01-18 DIAGNOSIS — N30.00 ACUTE CYSTITIS WITHOUT HEMATURIA: ICD-10-CM

## 2021-01-18 DIAGNOSIS — N90.89 VULVAR IRRITATION: ICD-10-CM

## 2021-01-18 DIAGNOSIS — C53.9 MALIGNANT NEOPLASM OF CERVIX, UNSPECIFIED SITE (H): ICD-10-CM

## 2021-01-18 DIAGNOSIS — D07.2 VAIN III (VAGINAL INTRAEPITHELIAL NEOPLASIA III): Primary | ICD-10-CM

## 2021-01-18 DIAGNOSIS — N39.0 RECURRENT UTI: ICD-10-CM

## 2021-01-18 DIAGNOSIS — R30.0 DYSURIA: ICD-10-CM

## 2021-01-18 DIAGNOSIS — N95.2 ATROPHIC VAGINITIS: ICD-10-CM

## 2021-01-18 LAB
ALBUMIN UR-MCNC: 10 MG/DL
APPEARANCE UR: ABNORMAL
BACTERIA #/AREA URNS HPF: ABNORMAL /HPF
BILIRUB UR QL STRIP: NEGATIVE
COLOR UR AUTO: YELLOW
GLUCOSE UR STRIP-MCNC: NEGATIVE MG/DL
HGB UR QL STRIP: ABNORMAL
KETONES UR STRIP-MCNC: NEGATIVE MG/DL
LEUKOCYTE ESTERASE UR QL STRIP: ABNORMAL
NITRATE UR QL: POSITIVE
NON-SQ EPI CELLS #/AREA URNS LPF: ABNORMAL /LPF
PH UR STRIP: 5.5 PH (ref 5–7)
RBC #/AREA URNS AUTO: ABNORMAL /HPF
SOURCE: ABNORMAL
SP GR UR STRIP: 1.02 (ref 1–1.03)
UROBILINOGEN UR STRIP-MCNC: NORMAL MG/DL (ref 0–2)
WBC #/AREA URNS AUTO: ABNORMAL /HPF

## 2021-01-18 PROCEDURE — 87186 SC STD MICRODIL/AGAR DIL: CPT | Performed by: OBSTETRICS & GYNECOLOGY

## 2021-01-18 PROCEDURE — 57420 EXAM OF VAGINA W/SCOPE: CPT | Performed by: OBSTETRICS & GYNECOLOGY

## 2021-01-18 PROCEDURE — 81001 URINALYSIS AUTO W/SCOPE: CPT | Performed by: OBSTETRICS & GYNECOLOGY

## 2021-01-18 PROCEDURE — 99214 OFFICE O/P EST MOD 30 MIN: CPT | Mod: 25 | Performed by: OBSTETRICS & GYNECOLOGY

## 2021-01-18 PROCEDURE — 87088 URINE BACTERIA CULTURE: CPT | Performed by: OBSTETRICS & GYNECOLOGY

## 2021-01-18 PROCEDURE — 87086 URINE CULTURE/COLONY COUNT: CPT | Performed by: OBSTETRICS & GYNECOLOGY

## 2021-01-18 RX ORDER — CEPHALEXIN 500 MG/1
500 CAPSULE ORAL EVERY 12 HOURS
Qty: 14 CAPSULE | Refills: 0 | Status: SHIPPED | OUTPATIENT
Start: 2021-01-18 | End: 2021-01-25

## 2021-01-18 RX ORDER — NYSTATIN 100000 U/G
OINTMENT TOPICAL
Qty: 30 G | Refills: 3 | Status: SHIPPED | OUTPATIENT
Start: 2021-01-18 | End: 2022-10-13

## 2021-01-18 RX ORDER — PHENAZOPYRIDINE HYDROCHLORIDE 100 MG/1
100 TABLET, FILM COATED ORAL 3 TIMES DAILY PRN
Qty: 60 TABLET | Refills: 2 | Status: SHIPPED | OUTPATIENT
Start: 2021-01-18 | End: 2022-08-22

## 2021-01-18 RX ORDER — TRIAMCINOLONE ACETONIDE 1 MG/G
OINTMENT TOPICAL
Qty: 30 G | Refills: 3 | Status: SHIPPED | OUTPATIENT
Start: 2021-01-18 | End: 2022-10-13

## 2021-01-18 RX ORDER — ESTRADIOL 0.1 MG/G
CREAM VAGINAL
Qty: 42.5 G | Refills: 3 | Status: SHIPPED | OUTPATIENT
Start: 2021-01-18 | End: 2022-01-24

## 2021-01-18 ASSESSMENT — PAIN SCALES - GENERAL: PAINLEVEL: NO PAIN (0)

## 2021-01-18 ASSESSMENT — MIFFLIN-ST. JEOR: SCORE: 1502.95

## 2021-01-18 NOTE — LETTER
1/18/2021         RE: Alis Hartman  [address]  [place] [address]        Dear Colleague,    Thank you for referring your patient, Alis Hartman, to the [facility]. Please see a copy of my visit note below.    Gynecologic Oncology Clinic - Established Patient Visit    Visit date: Jan 18, 2021     CC: VAIN III, h/o cervix cancer    Interval history: Alis Hartman is a 65 year old who has a history of cervix cancer treated with primary radiation who more recently has a history of VAIN III treated with laser. She has had some personal stressors, which has made clinical follow-up difficult and she has missed multiple appointments. Her last laser ablation was in 3/2020 with Dr. Webb. She has not had a Pap smear or exam since that time. She was treated for a UTI in December. She has had persistent urinary symptoms since radiation and recurrent UTIs. She felt symptoms resolved after treatment, but have recurred. She has burning primarily.     She denies any episodes of vaginal bleeding. She does not use any sort of vaginal cream. She doesn't use a vaginal dilator. She has been sexually active.      Relevant history:  She reports she has a history of cervical cancer around 1996.She was treated with primary radiation therapy, unsure if she also had chemotherapy or not.  This treatment was at [facility] [place].  3/29/96:  Cervix biopsy:  Moderately differentiated squamous cell carcinoma-Dr Javi Morrow performed the biopsy  10/26/11:  EUA colposcopy and biopsy                 Pathology: Cervix biopsy:  No dysplasia.  Uterus left apex:  Stroma hyalinization c/w irradiation.  Vagina upper biopsy: no dysplasia     8/12/13:  Upper vaginal wall:  VAIN1  12/22/14:  EUA, CO2 laser to the vagina, colposcopy of vagina                 Pathology:  VAIN3 at 3 o'clock  12/27/18:  Pap:  HSIL, HPV+  5/24/19: PROCEDURES: Vaginal colposcopy, vaginal biopsy, CO2 laser of the  "vagina  VAGINAL BIOPSY:   - Detached epithelium with high grade squamous intraepithelial lesion (vaginal intraepithelial neoplasia 3)   10/14/19: Pap HSIL/other HPV+  2/28/2020: biopsy of vagina Vain III; MRI recommended prior to OR; however, patient did not complete this  3/12/2020: EUA, biopsies, LASER: VAIN III  9/2020: Due for Pap smear with HPV-testing, patient cancelled appointment    Review of Systems:  Constitutional: weight gain, insomnia  Cv: no chest pain, palpitations,   Resp: no cough, shortness of breath  GI: no constipation, diarrhea, abdominal pain  : +dysuria, UTI, vaginal discomfort  MSK: ankle swelling and stiffness  Neuro: neg    Past Medical History:  Past Medical History:   Diagnosis Date     Depression      Hypertension      Malignant neoplasm of endocervix (H)     Tx with radiation     Other chronic pain     Low back     Urinary incontinence        Past Surgical History:  Past Surgical History:   Procedure Laterality Date     ABDOMINOPLASTY       BIOPSY CERVICAL, LOCAL EXCISION, SINGLE/MULTIPLE  10/26/2011    Procedure:BIOPSY CERVICAL, LOCAL EXCISION, SINGLE/MULTIPLE; EUA, cervical biopsies; Surgeon:BETTY TINEO; Location: OR     EXAM UNDER ANESTHESIA PELVIC N/A 3/12/2020    Procedure: Exam under anesthsia, vaginal biopsies, possible CO2 laser of the vagina;  Surgeon: Lilliam Roy MD;  Location: [facility] OR     GI SURGERY  2004    gastric bypass     LASER CO2 VAGINA N/A 3/2/2015    Procedure: LASER CO2 VAGINA;  Surgeon: Mariela Abdalla MD;  Location:  OR     LASER CO2 VAGINA N/A 5/24/2019    Procedure: Exam under anesthesia, vaginal biopsies, CO2 laser of the vagina;  Surgeon: Lilliam Roy MD;  Location:  OR        Physical Exam:  BP (!) 142/89 (BP Location: Right arm, Patient Position: Chair, Cuff Size: Adult Large)   Pulse 74   Temp 98.2  F (36.8  C) (Oral)   Ht 1.6 m (5' 3\")   Wt 99.9 kg (220 lb 3.2 oz)   SpO2 100%   BMI 39.01 kg/m     General " appearance: no acute distress, well groomed, sitting comfortably   Lymph: no inguinal adnopathy  :  External: vulva/labia with evidence of lichenification, otherwise no lesions  Vagina: significant pallor of the mucosa extending out to the keratinized epithelium, there is some erythema surrounding the urethra however, overall there is significant atrophy of the mucosal surfaces, within the vagina there is pallor of the skin and there is scarring with agglutiation of the upper vagina which does not separate on speculum exam. The cervix is not identified, there is evidence of scarring from prior procedures, see exam below.  Cervix: no visible  Uterus/adnexa: not palpable due to stenosis of the vagina, there is no nodularity noted    Colposcopy: The speculum is placed and Lugol's applied to the entire vagina. There is decreased uptake throughout the upper vagina in the areas of scarring; however, there are no areas of specific lesions or vascularity noted. The colposcope was used to exam this area in more detail and there are no hyperemic areas noted. No biopsies were taken.     Labs/Pathology:  Recent Results (from the past 48 hour(s))   Routine UA with micro reflex to culture    Collection Time: 01/18/21  2:52 PM    Specimen: Midstream Urine   Result Value Ref Range    Color Urine Yellow     Appearance Urine Slightly Cloudy     Glucose Urine Negative NEG^Negative mg/dL    Bilirubin Urine Negative NEG^Negative    Ketones Urine Negative NEG^Negative mg/dL    Specific Gravity Urine 1.024 1.003 - 1.035    Blood Urine Moderate (A) NEG^Negative    pH Urine 5.5 5.0 - 7.0 pH    Protein Albumin Urine 10 (A) NEG^Negative mg/dL    Urobilinogen mg/dL Normal 0.0 - 2.0 mg/dL    Nitrite Urine Positive (A) NEG^Negative    Leukocyte Esterase Urine Large (A) NEG^Negative    Source Midstream Urine     WBC Urine 10-25 (A) OTO5^0 - 5 /HPF    RBC Urine 2-5 (A) OTO2^O - 2 /HPF    Bacteria Urine Many (A) NEG^Negative /HPF    Squamous  Epithelial /LPF Urine Few FEW^Few /LPF   Urine Culture Aerobic Bacterial    Collection Time: 01/18/21  2:52 PM    Specimen: Midstream Urine   Result Value Ref Range    Specimen Description Midstream Urine     Special Requests Specimen received in preservative     Culture Micro (A)      50,000 to 100,000 colonies/mL  Gram negative rods  Susceptibility testing in progress      Culture Micro Culture in progress          Imaging review:  n/a    Assessment:  Aranza [Alis] AMINAH Hartman is a 65 year old who has a history of cervix cancer treated with primary radiation who more recently has a history of VAIN III treated with laser also with dysuria concerning for UTI and recurrent UTI and vaginal irritation secondary to severe vaginal atrophy    Plan:  - Per ASCCP guidelines for follow-up, she should have had HPV-based testing at 6 mos with colposcopy for any abnormal result and then HPV-based testing at 12 and 24mos. Given she presented today after multiple missed appointments, I elected to proceed with colposcopy based upon risk assessment. There were no visible lesions, thus no biopsies were taken. We will plan for her to return in 6 mos for HPV-based cytology testing.    - Dysuria: UA consistent with UTI. Will treat with cephalexin 500mg BID x 7 days given recently failed treatment. I suspect her atrophic vaginitis is contributing to her recurrent urinary tract infections. Culture sent     - Vaginal burning and irritation: Likely a combination of atrophy and radiation fibrosis. Based upon the appearance of her vaginal introitus and urethra, I think she may benefit from estrogen locally. Rx sent for local extrogen cream. She should use this daily for 1-2 weeks then 2 times weekly.     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 Rx for nystatin and triamcinolone. Advised to mix together and use once daily as needed for irritation.     Return to clinic in 6 mos for repeat Pap smear with HPV  cotesting as above.    In addition to her exam, I spent a total of 25 minutes with Aranza discussing her vaginal symptoms and options for management.     Dany Perez MD     Gynecologic Oncology       Again, thank you for allowing me to participate in the care of your patient.        Sincerely,        Dany Perez MD

## 2021-01-19 LAB
BACTERIA SPEC CULT: ABNORMAL
Lab: ABNORMAL
SPECIMEN SOURCE: ABNORMAL

## 2021-01-19 NOTE — PROGRESS NOTES
Gynecologic Oncology Clinic - Established Patient Visit    Visit date: Jan 18, 2021     CC: VAIN III, h/o cervix cancer    Interval history: Alis Hartman is a 65 year old who has a history of cervix cancer treated with primary radiation who more recently has a history of VAIN III treated with laser. She has had some personal stressors, which has made clinical follow-up difficult and she has missed multiple appointments. Her last laser ablation was in 3/2020 with Dr. Webb. She has not had a Pap smear or exam since that time. She was treated for a UTI in December. She has had persistent urinary symptoms since radiation and recurrent UTIs. She felt symptoms resolved after treatment, but have recurred. She has burning primarily.     She denies any episodes of vaginal bleeding. She does not use any sort of vaginal cream. She doesn't use a vaginal dilator. She has been sexually active.      Relevant history:  She reports she has a history of cervical cancer around 1996.She was treated with primary radiation therapy, unsure if she also had chemotherapy or not.  This treatment was at [facility] [place].  3/29/96:  Cervix biopsy:  Moderately differentiated squamous cell carcinoma-Dr Javi Morrow performed the biopsy  10/26/11:  EUA colposcopy and biopsy                 Pathology: Cervix biopsy:  No dysplasia.  Uterus left apex:  Stroma hyalinization c/w irradiation.  Vagina upper biopsy: no dysplasia     8/12/13:  Upper vaginal wall:  VAIN1  12/22/14:  EUA, CO2 laser to the vagina, colposcopy of vagina                 Pathology:  VAIN3 at 3 o'clock  12/27/18:  Pap:  HSIL, HPV+  5/24/19: PROCEDURES: Vaginal colposcopy, vaginal biopsy, CO2 laser of the vagina  VAGINAL BIOPSY:   - Detached epithelium with high grade squamous intraepithelial lesion (vaginal intraepithelial neoplasia 3)   10/14/19: Pap HSIL/other HPV+  2/28/2020: biopsy of vagina Vain III; MRI recommended prior to OR; however, patient did not complete this  3/12/2020:  "EUA, biopsies, LASER: VAIN III  9/2020: Due for Pap smear with HPV-testing, patient cancelled appointment    Review of Systems:  Constitutional: weight gain, insomnia  Cv: no chest pain, palpitations,   Resp: no cough, shortness of breath  GI: no constipation, diarrhea, abdominal pain  : +dysuria, UTI, vaginal discomfort  MSK: ankle swelling and stiffness  Neuro: neg    Past Medical History:  Past Medical History:   Diagnosis Date     Depression      Hypertension      Malignant neoplasm of endocervix (H)     Tx with radiation     Other chronic pain     Low back     Urinary incontinence        Past Surgical History:  Past Surgical History:   Procedure Laterality Date     ABDOMINOPLASTY       BIOPSY CERVICAL, LOCAL EXCISION, SINGLE/MULTIPLE  10/26/2011    Procedure:BIOPSY CERVICAL, LOCAL EXCISION, SINGLE/MULTIPLE; EUA, cervical biopsies; Surgeon:BETTY TINEO; Location:[facility] OR     EXAM UNDER ANESTHESIA PELVIC N/A 3/12/2020    Procedure: Exam under anesthsia, vaginal biopsies, possible CO2 laser of the vagina;  Surgeon: Lilliam Roy MD;  Location: [facility] OR     GI SURGERY  2004    gastric bypass     LASER CO2 VAGINA N/A 3/2/2015    Procedure: LASER CO2 VAGINA;  Surgeon: Mariela Abdalla MD;  Location: [facility] OR     LASER CO2 VAGINA N/A 5/24/2019    Procedure: Exam under anesthesia, vaginal biopsies, CO2 laser of the vagina;  Surgeon: Lilliam Roy MD;  Location: [facility] OR        Physical Exam:  BP (!) 142/89 (BP Location: Right arm, Patient Position: Chair, Cuff Size: Adult Large)   Pulse 74   Temp 98.2  F (36.8  C) (Oral)   Ht 1.6 m (5' 3\")   Wt 99.9 kg (220 lb 3.2 oz)   SpO2 100%   BMI 39.01 kg/m     General appearance: no acute distress, well groomed, sitting comfortably   Lymph: no inguinal adnopathy  :  External: vulva/labia with evidence of lichenification, otherwise no lesions  Vagina: significant pallor of the mucosa extending out to the keratinized epithelium, there is some " erythema surrounding the urethra however, overall there is significant atrophy of the mucosal surfaces, within the vagina there is pallor of the skin and there is scarring with agglutiation of the upper vagina which does not separate on speculum exam. The cervix is not identified, there is evidence of scarring from prior procedures, see exam below.  Cervix: no visible  Uterus/adnexa: not palpable due to stenosis of the vagina, there is no nodularity noted    Colposcopy: The speculum is placed and Lugol's applied to the entire vagina. There is decreased uptake throughout the upper vagina in the areas of scarring; however, there are no areas of specific lesions or vascularity noted. The colposcope was used to exam this area in more detail and there are no hyperemic areas noted. No biopsies were taken.     Labs/Pathology:  Recent Results (from the past 48 hour(s))   Routine UA with micro reflex to culture    Collection Time: 01/18/21  2:52 PM    Specimen: Midstream Urine   Result Value Ref Range    Color Urine Yellow     Appearance Urine Slightly Cloudy     Glucose Urine Negative NEG^Negative mg/dL    Bilirubin Urine Negative NEG^Negative    Ketones Urine Negative NEG^Negative mg/dL    Specific Gravity Urine 1.024 1.003 - 1.035    Blood Urine Moderate (A) NEG^Negative    pH Urine 5.5 5.0 - 7.0 pH    Protein Albumin Urine 10 (A) NEG^Negative mg/dL    Urobilinogen mg/dL Normal 0.0 - 2.0 mg/dL    Nitrite Urine Positive (A) NEG^Negative    Leukocyte Esterase Urine Large (A) NEG^Negative    Source Midstream Urine     WBC Urine 10-25 (A) OTO5^0 - 5 /HPF    RBC Urine 2-5 (A) OTO2^O - 2 /HPF    Bacteria Urine Many (A) NEG^Negative /HPF    Squamous Epithelial /LPF Urine Few FEW^Few /LPF   Urine Culture Aerobic Bacterial    Collection Time: 01/18/21  2:52 PM    Specimen: Midstream Urine   Result Value Ref Range    Specimen Description Midstream Urine     Special Requests Specimen received in preservative     Culture Micro (A)       50,000 to 100,000 colonies/mL  Gram negative rods  Susceptibility testing in progress      Culture Micro Culture in progress          Imaging review:  n/a    Assessment:  Aranza [Brandeehannah Hartman is a 65 year old who has a history of cervix cancer treated with primary radiation who more recently has a history of VAIN III treated with laser also with dysuria concerning for UTI and recurrent UTI and vaginal irritation secondary to severe vaginal atrophy    Plan:  - Per ASCCP guidelines for follow-up, she should have had HPV-based testing at 6 mos with colposcopy for any abnormal result and then HPV-based testing at 12 and 24mos. Given she presented today after multiple missed appointments, I elected to proceed with colposcopy based upon risk assessment. There were no visible lesions, thus no biopsies were taken. We will plan for her to return in 6 mos for HPV-based cytology testing.    - Dysuria: UA consistent with UTI. Will treat with cephalexin 500mg BID x 7 days given recently failed treatment. I suspect her atrophic vaginitis is contributing to her recurrent urinary tract infections. Culture sent     - Vaginal burning and irritation: Likely a combination of atrophy and radiation fibrosis. Based upon the appearance of her vaginal introitus and urethra, I think she may benefit from estrogen locally. Rx sent for local extrogen cream. She should use this daily for 1-2 weeks then 2 times weekly.     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 Rx for nystatin and triamcinolone. Advised to mix together and use once daily as needed for irritation.     Return to clinic in 6 mos for repeat Pap smear with HPV cotesting as above.    In addition to her exam, I spent a total of 25 minutes with Aranza discussing her vaginal symptoms and options for management.     Dany Perez MD     Gynecologic Oncology

## 2021-01-20 ENCOUNTER — PATIENT OUTREACH (OUTPATIENT)
Dept: ONCOLOGY | Facility: CLINIC | Age: 66
End: 2021-01-20

## 2021-01-20 DIAGNOSIS — N30.00 ACUTE CYSTITIS WITHOUT HEMATURIA: Primary | ICD-10-CM

## 2021-01-20 RX ORDER — CIPROFLOXACIN 250 MG/1
250 TABLET, FILM COATED ORAL EVERY 12 HOURS
Qty: 14 TABLET | Refills: 0 | Status: SHIPPED | OUTPATIENT
Start: 2021-01-20 | End: 2021-01-27

## 2021-01-20 NOTE — PROGRESS NOTES
Urine culture is final showing bladder infection  Pt was given keflex on 1/18/21  Per culture sensitivities keflex is no appropriate   MD reviewed  Will prescribe cipro and sent to pharmacy

## 2021-01-20 NOTE — PROGRESS NOTES
Spoke with pt  Inquired on symptoms  Per pt she is still having frequency, stinging/burning with urination   Pt thinks it might be a little better  Pt also states that she did not get the pyridium rx that md sent to pharmacy pt was told they did not get it  Explained to that culture showed that her antibiotic that she was given is not effective for her infection   Explained will need to send a new rx to her pharmacy  Will also call the pharmacy and check on the pyridim rx  Pt to  rx later today, advised to stop keflex and start cipro

## 2021-04-12 ENCOUNTER — TELEPHONE (OUTPATIENT)
Dept: ONCOLOGY | Facility: CLINIC | Age: 66
End: 2021-04-12
Payer: MEDICARE

## 2021-04-12 NOTE — TELEPHONE ENCOUNTER
Patient called and is requesting an order for a son mammogram, she was due in February. Her last one was Feb 2020, which was completed at [facility] in [place].    The patient is requesting a son mammo due to inconclusive results in 2020. She would like to do this mammogram in .    I informed the patient I would contact her once the order has been entered, to schedule the appointment.    Please call her at home with any questions. Thank you.    Arlene Pinon  [facility]   [phone]     DM (diabetes mellitus)    HTN (hypertension)

## 2021-05-12 ENCOUNTER — TELEPHONE (OUTPATIENT)
Dept: ONCOLOGY | Facility: CLINIC | Age: 66
End: 2021-05-12

## 2021-05-12 NOTE — TELEPHONE ENCOUNTER
Received vm from patient. Writer had difficulty understanding the reason for her call. Left vm for pt to return call.  Gypsy Cordero  RN, BSN, OCN

## 2021-05-12 NOTE — TELEPHONE ENCOUNTER
Patient reports it is like she is having a period. Bleeding started about 1 week ago. She is wearing a large pad and changes it twice/day. It is not soaked but more than a pantiliner could hold. States once she awakened and the pad was soaked. Sometimes has pain with wiping. Also reports dysuria, increased urgency and frequency.  Denies fevers and cramping.   Gypsy Cordero  RN, BSN, OCN    Returned call to patient to provide Mar PLASENCIA recommendation that patient should be evaluated by Dr Perez. Patient in agreement and appointment made for 5/17/21  Gypsy Cordero  RN, BSN, OCN

## 2021-05-17 ENCOUNTER — ONCOLOGY VISIT (OUTPATIENT)
Dept: ONCOLOGY | Facility: CLINIC | Age: 66
End: 2021-05-17
Payer: MEDICARE

## 2021-05-17 VITALS
WEIGHT: 220 LBS | BODY MASS INDEX: 38.97 KG/M2 | SYSTOLIC BLOOD PRESSURE: 140 MMHG | DIASTOLIC BLOOD PRESSURE: 82 MMHG | OXYGEN SATURATION: 100 % | HEART RATE: 75 BPM

## 2021-05-17 DIAGNOSIS — N93.9 VAGINAL BLEEDING: Primary | ICD-10-CM

## 2021-05-17 DIAGNOSIS — D07.2 VAIN III (VAGINAL INTRAEPITHELIAL NEOPLASIA III): ICD-10-CM

## 2021-05-17 DIAGNOSIS — C53.9 MALIGNANT NEOPLASM OF CERVIX, UNSPECIFIED SITE (H): ICD-10-CM

## 2021-05-17 PROCEDURE — 57100 BIOPSY VAGINAL MUCOSA SIMPLE: CPT | Performed by: OBSTETRICS & GYNECOLOGY

## 2021-05-17 PROCEDURE — 88305 TISSUE EXAM BY PATHOLOGIST: CPT | Performed by: OBSTETRICS & GYNECOLOGY

## 2021-05-17 PROCEDURE — 99214 OFFICE O/P EST MOD 30 MIN: CPT | Mod: 25 | Performed by: OBSTETRICS & GYNECOLOGY

## 2021-05-17 ASSESSMENT — PAIN SCALES - GENERAL: PAINLEVEL: NO PAIN (0)

## 2021-05-17 NOTE — Clinical Note
5/17/2021         RE: Alis Hartman  [address]  [place] [address]        Dear Colleague,    Thank you for referring your patient, Alis Hartman, to the [facility]. Please see a copy of my visit note below.    Gynecologic Oncology Clinic - Established Patient Visit    Visit date: May 17, 2021     CC: vaginal bleeding    Interval history: Alis Hartman is a 65 year old who has a history of cervix cancer treated with primary radiation who more recently has a history of VAIN III treated with laser. Her last laser ablation was in 3/2020 with Dr. Webb. She was last seen in our clinic in January 2021 for surveillance visit.     She presents today after episode of vaginal bleeding. She is not sure it was vaginal in source as she has history of recurrent UTI. She does have some tingling/dysuria. She noted pink tinged discharge when wiping, but she has also noted blood on her nightgown when she doesn't wear a pad. She is not currently sexually active. She is not using any types of creams to the vulva/vagina. She denies other irritation or symptoms.     Relevant history:  3/29/96:  Cervix biopsy:  Moderately differentiated squamous cell carcinoma. She was treated with primary radiation therapy, unsure if she also had chemotherapy or not.  This treatment was at [facility].    10/26/11:  EUA colposcopy and biopsy                 Pathology: Cervix biopsy:  No dysplasia.  Uterus left apex:  Stroma hyalinization c/w irradiation.  Vagina upper biopsy: no dysplasia  8/12/13:  Upper vaginal wall:  VAIN1  12/22/14:  EUA, CO2 laser to the vagina, colposcopy of vagina                 Pathology:  VAIN3 at 3 o'clock  12/27/18:  Pap:  HSIL, HPV+  5/24/19: PROCEDURES: Vaginal colposcopy, vaginal biopsy, CO2 laser of the vagina  VAGINAL BIOPSY:   - Detached epithelium with high grade squamous intraepithelial lesion (vaginal intraepithelial neoplasia 3)   10/14/19: Pap  HSIL/other HPV+  2/28/2020: biopsy of vagina Vain III; MRI recommended prior to OR; however, patient did not complete this  3/12/2020: EUA, biopsies, LASER: VAIN III  9/2020: Due for Pap smear with HPV-testing, patient cancelled appointment  1/2021: Vaginal colposcopy with no obvious areas of abnormality/lesions    Review of Systems:  As per HPI, otherwise non-contributory.     Past Medical History:  Past Medical History:   Diagnosis Date     Depression      Hypertension      Malignant neoplasm of endocervix (H)     Tx with radiation     Other chronic pain     Low back     Urinary incontinence        Past Surgical History:  Past Surgical History:   Procedure Laterality Date     ABDOMINOPLASTY       BIOPSY CERVICAL, LOCAL EXCISION, SINGLE/MULTIPLE  10/26/2011    Procedure:BIOPSY CERVICAL, LOCAL EXCISION, SINGLE/MULTIPLE; EUA, cervical biopsies; Surgeon:BETTY TINEO; Location: OR     EXAM UNDER ANESTHESIA PELVIC N/A 3/12/2020    Procedure: Exam under anesthsia, vaginal biopsies, possible CO2 laser of the vagina;  Surgeon: Lilliam Roy MD;  [facility]: [facility]     GI SURGERY  2004    gastric bypass     LASER CO2 VAGINA N/A 3/2/2015    Procedure: LASER CO2 VAGINA;  Surgeon: Mariela Abdalla MD;  Location:  OR     LASER CO2 VAGINA N/A 5/24/2019    Procedure: Exam under anesthesia, vaginal biopsies, CO2 laser of the vagina;  Surgeon: Lilliam Roy MD;  Location:  OR        Physical Exam:  BP (!) 140/82 (BP Location: Right arm, Patient Position: Chair, Cuff Size: Adult Large)   Pulse 75   Wt 99.8 kg (220 lb)   SpO2 100%   BMI 38.97 kg/m     General appearance: no acute distress, well groomed, sitting comfortably   Lymph: no cervical or supra-clavicular adenopathy  :  External: vulva/labia overall normal in appearance, some lichenification of the labia majora bilaterally  Vagina: significant pallor of the mucosa extending out to the keratinized epithelium, there is some  telangiectasias surrounding the urethra however, overall there is significant atrophy of the mucosal surfaces, within the vagina there is pallor of the skin and there is scarring with agglutiation of the upper vagina. Bleeding noted at the right apex, which initially I thought there was a lesion in this area; however, upon further examination, it was felt that perhaps this was just secondary to opening of the speculum. See below  Cervix: Unable to visualize due to agglutination of the upper vagina  Uterus/adnexa: not palpable due to stenosis of the vagina, there is no nodularity noted      Vaginal biopsy  Patient was consented for procedure. Area at the right vaginal apex was cleansed with betadine and then using Tischler biopsy forceps two biopsies were taken. This was very challenging due to scarring and likely only the second biopsy was diagnostic. Silver nitrate was used for hemostasis. No other areas or concern/lesions.    Labs/Pathology:  No results found for this or any previous visit (from the past 48 hour(s)).      Imaging review:  n/a    Assessment:  Aranza [Brandeeale] AMINAH Hartman is a 65 year old  who has a history of cervix cancer treated with primary radiation who more recently has a history of VAIN III treated with laser with vaginal bleeding and possible lesion within the vagina.    Plan:  - Biopsy obtained today. As noted, I cannot tell for sure if this is a lesion or just an area where the tissue has pulled open from the speculum. Regardless, I would recommend EUA for better biopsies and potential treatment if her biopsy returns showing VAIN III.    - Dysuria: UA at this time wouldn't be entirely accurate due to bleeding incited with exam. Thus I would recommend she contact us if the symptoms don't resolve and I can order UA for closer to home.     - Vaginal burning and irritation: Likely a combination of atrophy and radiation fibrosis. Based upon the appearance of her vaginal introitus and urethra, I  think she may benefit from estrogen locally, which she didn't start after last visit. However, given the findings on exam, we will first deal with this issue and then readdress.    - Lichen simplex chronicus: this appears improved from her prior exam.       In addition to exam and biopsy, I spent a total of 15 minutes in discussion of the findings and treatment options.    Dany Perez MD     Gynecologic Oncology       Again, thank you for allowing me to participate in the care of your patient.        Sincerely,        Dany Perez MD

## 2021-05-18 NOTE — PROGRESS NOTES
Gynecologic Oncology Clinic - Established Patient Visit    Visit date: May 17, 2021     CC: vaginal bleeding    Interval history: Alis Hartman is a 65 year old who has a history of cervix cancer treated with primary radiation who more recently has a history of VAIN III treated with laser. Her last laser ablation was in 3/2020 with Dr. Webb. She was last seen in our clinic in January 2021 for surveillance visit.     She presents today after episode of vaginal bleeding. She is not sure it was vaginal in source as she has history of recurrent UTI. She does have some tingling/dysuria. She noted pink tinged discharge when wiping, but she has also noted blood on her nightgown when she doesn't wear a pad. She is not currently sexually active. She is not using any types of creams to the vulva/vagina. She denies other irritation or symptoms.     Relevant history:  3/29/96:  Cervix biopsy:  Moderately differentiated squamous cell carcinoma. She was treated with primary radiation therapy, unsure if she also had chemotherapy or not.  [place] [facility]    10/26/11:  EUA colposcopy and biopsy                 Pathology: Cervix biopsy:  No dysplasia.  Uterus left apex:  Stroma hyalinization c/w irradiation.  Vagina upper biopsy: no dysplasia  8/12/13:  Upper vaginal wall:  VAIN1  12/22/14:  EUA, CO2 laser to the vagina, colposcopy of vagina                 Pathology:  VAIN3 at 3 o'clock  12/27/18:  Pap:  HSIL, HPV+  5/24/19: PROCEDURES: Vaginal colposcopy, vaginal biopsy, CO2 laser of the vagina  VAGINAL BIOPSY:   - Detached epithelium with high grade squamous intraepithelial lesion (vaginal intraepithelial neoplasia 3)   10/14/19: Pap HSIL/other HPV+  2/28/2020: biopsy of vagina Vain III; MRI recommended prior to OR; however, patient did not complete this  3/12/2020: EUA, biopsies, LASER: VAIN III  9/2020: Due for Pap smear with HPV-testing, patient cancelled appointment  1/2021: Vaginal colposcopy with no  obvious areas of abnormality/lesions    Review of Systems:  As per HPI, otherwise non-contributory.     Past Medical History:  Past Medical History:   Diagnosis Date     Depression      Hypertension      Malignant neoplasm of endocervix (H)     Tx with radiation     Other chronic pain     Low back     Urinary incontinence        Past Surgical History:  Past Surgical History:   Procedure Laterality Date     ABDOMINOPLASTY       BIOPSY CERVICAL, LOCAL EXCISION, SINGLE/MULTIPLE  10/26/2011    Procedure:BIOPSY CERVICAL, LOCAL EXCISION, SINGLE/MULTIPLE; EUA, cervical biopsies; Surgeon:BETTY TINEO; Location:[facility] OR     EXAM UNDER ANESTHESIA PELVIC N/A 3/12/2020    Procedure: Exam under anesthsia, vaginal biopsies, possible CO2 laser of the vagina;  Surgeon: Lilliam Roy MD;  [facility] OR     GI SURGERY  2004    gastric bypass     LASER CO2 VAGINA N/A 3/2/2015    Procedure: LASER CO2 VAGINA;  Surgeon: Mariela Abdalla MD;  Location:  OR     LASER CO2 VAGINA N/A 5/24/2019    Procedure: Exam under anesthesia, vaginal biopsies, CO2 laser of the vagina;  Surgeon: Lilliam Roy MD;  Location:  OR        Physical Exam:  BP (!) 140/82 (BP Location: Right arm, Patient Position: Chair, Cuff Size: Adult Large)   Pulse 75   Wt 99.8 kg (220 lb)   SpO2 100%   BMI 38.97 kg/m     General appearance: no acute distress, well groomed, sitting comfortably   Lymph: no cervical or supra-clavicular adenopathy  :  External: vulva/labia overall normal in appearance, some lichenification of the labia majora bilaterally  Vagina: significant pallor of the mucosa extending out to the keratinized epithelium, there is some telangiectasias surrounding the urethra however, overall there is significant atrophy of the mucosal surfaces, within the vagina there is pallor of the skin and there is scarring with agglutiation of the upper vagina. Bleeding noted at the right apex, which initially I thought there was  a lesion in this area; however, upon further examination, it was felt that perhaps this was just secondary to opening of the speculum. See below  Cervix: Unable to visualize due to agglutination of the upper vagina  Uterus/adnexa: not palpable due to stenosis of the vagina, there is no nodularity noted      Vaginal biopsy  Patient was consented for procedure. Area at the right vaginal apex was cleansed with betadine and then using Tischler biopsy forceps two biopsies were taken. This was very challenging due to scarring and likely only the second biopsy was diagnostic. Silver nitrate was used for hemostasis. No other areas or concern/lesions.    Labs/Pathology:  No results found for this or any previous visit (from the past 48 hour(s)).      Imaging review:  n/a    Assessment:  Aranza [Brandeeale] AMINAH Hartman is a 65 year old  who has a history of cervix cancer treated with primary radiation who more recently has a history of VAIN III treated with laser with vaginal bleeding and possible lesion within the vagina.    Plan:  - Biopsy obtained today. As noted, I cannot tell for sure if this is a lesion or just an area where the tissue has pulled open from the speculum. Regardless, I would recommend EUA for better biopsies and potential treatment if her biopsy returns showing VAIN III.    - Dysuria: UA at this time wouldn't be entirely accurate due to bleeding incited with exam. Thus I would recommend she contact us if the symptoms don't resolve and I can order UA for closer to home.     - Vaginal burning and irritation: Likely a combination of atrophy and radiation fibrosis. Based upon the appearance of her vaginal introitus and urethra, I think she may benefit from estrogen locally, which she didn't start after last visit. However, given the findings on exam, we will first deal with this issue and then readdress.    - Lichen simplex chronicus: this appears improved from her prior exam.       In addition to exam and  biopsy, I spent a total of 15 minutes in discussion of the findings and treatment options.    Dany Perez MD     Gynecologic Oncology

## 2021-05-20 ENCOUNTER — PATIENT OUTREACH (OUTPATIENT)
Dept: ONCOLOGY | Facility: CLINIC | Age: 66
End: 2021-05-20

## 2021-05-20 LAB — COPATH REPORT: NORMAL

## 2021-05-20 NOTE — PROGRESS NOTES
[facility]:  Care Coordination Note    Received voicemail message from patient this afternoon asking if her biopsy results from Monday 05/17/21 are done yet.  Writer attempted returning patient's call this afternoon to let her know the results are still pending at this time, and to reassure her that we will contact her once the results become available - however, patient did not answer call at this time.  This information was left on patient's voicemail.  Encouraged patient to call back with any other questions or concerns.     Nicholas Jarrell, RN, BSN, OCN  Oncology Care Coordinator  [facility]

## 2021-05-25 ENCOUNTER — TELEPHONE (OUTPATIENT)
Dept: ONCOLOGY | Facility: CLINIC | Age: 66
End: 2021-05-25

## 2021-05-26 DIAGNOSIS — D07.2 VAIN III (VAGINAL INTRAEPITHELIAL NEOPLASIA III): Primary | ICD-10-CM

## 2021-05-27 ENCOUNTER — TELEPHONE (OUTPATIENT)
Dept: ONCOLOGY | Facility: CLINIC | Age: 66
End: 2021-05-27

## 2021-05-27 NOTE — TELEPHONE ENCOUNTER
Surgery is scheduled with Dr. Perez on 6/8 at the [facility] .  Scheduled per patient.    H&P: to be completed by PCP or Surgeon    Additional appointments:   NO ADDITIONAL APPOINTMENTS NEEDED AT THIS TIME    COVID-19 test: 6/4 at [place]     Post-op: will be scheduled by the clinic.     The RN completed the education regarding the surgery.     Patient will receive a phone call from pre-admission nurses 1-2 days prior to surgery with arrival and start time.    Patient will complete COVID-19 test that was scheduled by surgical coordinator 2-4 days prior to surgery.     I called the patient and was able to confirm the scheduled information above.

## 2021-05-27 NOTE — TELEPHONE ENCOUNTER
Called Aranza to discuss dates for surgery with Dr. Perez at .    Let her know that 6/8 would be ideal as Dr. Perez will be there.    She would like to speak with her sister. Will call back around 3-4 PM today.

## 2021-05-28 DIAGNOSIS — Z11.59 ENCOUNTER FOR SCREENING FOR OTHER VIRAL DISEASES: ICD-10-CM

## 2021-06-01 ENCOUNTER — PATIENT OUTREACH (OUTPATIENT)
Dept: ONCOLOGY | Facility: CLINIC | Age: 66
End: 2021-06-01

## 2021-06-01 NOTE — PROGRESS NOTES
[facility]:  Care Coordination Note    Surgery packet mailed to patient's home address this morning, in preparation for patient's upcoming surgery with Dr. Perez which is scheduled for 06/08/2021 (Exam Under Anesthesia, Vaginal Biopsies, Laser Ablation of the Vagina).      Writer placed telephone call to patient this afternoon to review pre-op instructions, NPO guidelines, showering before surgery, and aftercare guidelines.  Patient verbalized understanding, and denies questions/concerns at this time.     Nicholas Jarrell RN, BSN, OCN  Oncology Care Coordinator  [facility]

## 2021-06-05 ENCOUNTER — MEDICAL CORRESPONDENCE (OUTPATIENT)
Dept: HEALTH INFORMATION MANAGEMENT | Facility: CLINIC | Age: 66
End: 2021-06-05

## 2021-06-07 ENCOUNTER — ANESTHESIA EVENT (OUTPATIENT)
Dept: SURGERY | Facility: AMBULATORY SURGERY CENTER | Age: 66
End: 2021-06-07
Payer: MEDICARE

## 2021-06-08 ENCOUNTER — HOSPITAL ENCOUNTER (OUTPATIENT)
Facility: AMBULATORY SURGERY CENTER | Age: 66
End: 2021-06-08
Attending: OBSTETRICS & GYNECOLOGY
Payer: MEDICARE

## 2021-06-08 ENCOUNTER — ANESTHESIA (OUTPATIENT)
Dept: SURGERY | Facility: AMBULATORY SURGERY CENTER | Age: 66
End: 2021-06-08
Payer: MEDICARE

## 2021-06-08 VITALS
OXYGEN SATURATION: 99 % | RESPIRATION RATE: 20 BRPM | DIASTOLIC BLOOD PRESSURE: 36 MMHG | TEMPERATURE: 97.4 F | BODY MASS INDEX: 38.94 KG/M2 | SYSTOLIC BLOOD PRESSURE: 91 MMHG | WEIGHT: 219.8 LBS | HEART RATE: 83 BPM

## 2021-06-08 DIAGNOSIS — D07.2 VAIN III (VAGINAL INTRAEPITHELIAL NEOPLASIA III): Primary | ICD-10-CM

## 2021-06-08 PROCEDURE — G8918 PT W/O PREOP ORDER IV AB PRO: HCPCS

## 2021-06-08 PROCEDURE — 88305 TISSUE EXAM BY PATHOLOGIST: CPT | Performed by: PATHOLOGY

## 2021-06-08 PROCEDURE — 57061 DESTRUCTION VAG LESIONS SMPL: CPT

## 2021-06-08 PROCEDURE — G8907 PT DOC NO EVENTS ON DISCHARG: HCPCS

## 2021-06-08 RX ORDER — AMLODIPINE BESYLATE 5 MG/1
10 TABLET ORAL DAILY
Status: ON HOLD | COMMUNITY
Start: 2021-05-06 | End: 2024-07-16

## 2021-06-08 RX ORDER — ALBUTEROL SULFATE 0.83 MG/ML
2.5 SOLUTION RESPIRATORY (INHALATION) EVERY 4 HOURS PRN
Status: DISCONTINUED | OUTPATIENT
Start: 2021-06-08 | End: 2021-06-09 | Stop reason: HOSPADM

## 2021-06-08 RX ORDER — LIDOCAINE HYDROCHLORIDE 20 MG/ML
INJECTION, SOLUTION INFILTRATION; PERINEURAL PRN
Status: DISCONTINUED | OUTPATIENT
Start: 2021-06-08 | End: 2021-06-08

## 2021-06-08 RX ORDER — OXYCODONE HYDROCHLORIDE 10 MG/1
10 TABLET ORAL 3 TIMES DAILY PRN
COMMUNITY
Start: 2021-05-04 | End: 2021-06-08

## 2021-06-08 RX ORDER — GLYCOPYRROLATE 0.2 MG/ML
INJECTION, SOLUTION INTRAMUSCULAR; INTRAVENOUS PRN
Status: DISCONTINUED | OUTPATIENT
Start: 2021-06-08 | End: 2021-06-08

## 2021-06-08 RX ORDER — ONDANSETRON 4 MG/1
4 TABLET, ORALLY DISINTEGRATING ORAL EVERY 30 MIN PRN
Status: DISCONTINUED | OUTPATIENT
Start: 2021-06-08 | End: 2021-06-09 | Stop reason: HOSPADM

## 2021-06-08 RX ORDER — NALOXONE HYDROCHLORIDE 0.4 MG/ML
0.2 INJECTION, SOLUTION INTRAMUSCULAR; INTRAVENOUS; SUBCUTANEOUS
Status: DISCONTINUED | OUTPATIENT
Start: 2021-06-08 | End: 2021-06-09 | Stop reason: HOSPADM

## 2021-06-08 RX ORDER — OXYCODONE HYDROCHLORIDE 5 MG/1
5-10 TABLET ORAL EVERY 4 HOURS PRN
Status: DISCONTINUED | OUTPATIENT
Start: 2021-06-08 | End: 2021-06-09 | Stop reason: HOSPADM

## 2021-06-08 RX ORDER — LIDOCAINE 40 MG/G
CREAM TOPICAL
Status: DISCONTINUED | OUTPATIENT
Start: 2021-06-08 | End: 2021-06-09 | Stop reason: HOSPADM

## 2021-06-08 RX ORDER — SODIUM CHLORIDE, SODIUM LACTATE, POTASSIUM CHLORIDE, CALCIUM CHLORIDE 600; 310; 30; 20 MG/100ML; MG/100ML; MG/100ML; MG/100ML
INJECTION, SOLUTION INTRAVENOUS CONTINUOUS
Status: DISCONTINUED | OUTPATIENT
Start: 2021-06-08 | End: 2021-06-09 | Stop reason: HOSPADM

## 2021-06-08 RX ORDER — DOCUSATE SODIUM 100 MG/1
100 CAPSULE, LIQUID FILLED ORAL 2 TIMES DAILY PRN
Status: ON HOLD | COMMUNITY
Start: 2021-05-06 | End: 2024-07-25

## 2021-06-08 RX ORDER — ACETAMINOPHEN 325 MG/1
975 TABLET ORAL ONCE
Status: COMPLETED | OUTPATIENT
Start: 2021-06-08 | End: 2021-06-08

## 2021-06-08 RX ORDER — OXYCODONE HYDROCHLORIDE 5 MG/1
10 TABLET ORAL
Status: DISCONTINUED | OUTPATIENT
Start: 2021-06-08 | End: 2021-06-09 | Stop reason: HOSPADM

## 2021-06-08 RX ORDER — MEPERIDINE HYDROCHLORIDE 25 MG/ML
12.5 INJECTION INTRAMUSCULAR; INTRAVENOUS; SUBCUTANEOUS
Status: DISCONTINUED | OUTPATIENT
Start: 2021-06-08 | End: 2021-06-09 | Stop reason: HOSPADM

## 2021-06-08 RX ORDER — DEXAMETHASONE SODIUM PHOSPHATE 4 MG/ML
4 INJECTION, SOLUTION INTRA-ARTICULAR; INTRALESIONAL; INTRAMUSCULAR; INTRAVENOUS; SOFT TISSUE EVERY 10 MIN PRN
Status: DISCONTINUED | OUTPATIENT
Start: 2021-06-08 | End: 2021-06-09 | Stop reason: HOSPADM

## 2021-06-08 RX ORDER — LABETALOL HYDROCHLORIDE 5 MG/ML
10 INJECTION, SOLUTION INTRAVENOUS
Status: DISCONTINUED | OUTPATIENT
Start: 2021-06-08 | End: 2021-06-09 | Stop reason: HOSPADM

## 2021-06-08 RX ORDER — ONDANSETRON 2 MG/ML
INJECTION INTRAMUSCULAR; INTRAVENOUS PRN
Status: DISCONTINUED | OUTPATIENT
Start: 2021-06-08 | End: 2021-06-08

## 2021-06-08 RX ORDER — FENTANYL CITRATE 50 UG/ML
25-50 INJECTION, SOLUTION INTRAMUSCULAR; INTRAVENOUS
Status: DISCONTINUED | OUTPATIENT
Start: 2021-06-08 | End: 2021-06-09 | Stop reason: HOSPADM

## 2021-06-08 RX ORDER — IODINE AND POTASSIUM IODIDE 50; 100 MG/ML; MG/ML
LIQUID ORAL PRN
Status: DISCONTINUED | OUTPATIENT
Start: 2021-06-08 | End: 2021-06-08 | Stop reason: HOSPADM

## 2021-06-08 RX ORDER — FENTANYL CITRATE 50 UG/ML
INJECTION, SOLUTION INTRAMUSCULAR; INTRAVENOUS PRN
Status: DISCONTINUED | OUTPATIENT
Start: 2021-06-08 | End: 2021-06-08

## 2021-06-08 RX ORDER — PROPOFOL 10 MG/ML
INJECTION, EMULSION INTRAVENOUS CONTINUOUS PRN
Status: DISCONTINUED | OUTPATIENT
Start: 2021-06-08 | End: 2021-06-08

## 2021-06-08 RX ORDER — ACETAMINOPHEN 325 MG/1
975 TABLET ORAL ONCE
Status: DISCONTINUED | OUTPATIENT
Start: 2021-06-08 | End: 2021-06-09 | Stop reason: HOSPADM

## 2021-06-08 RX ORDER — BUPIVACAINE HYDROCHLORIDE AND EPINEPHRINE 5; 5 MG/ML; UG/ML
INJECTION, SOLUTION PERINEURAL PRN
Status: DISCONTINUED | OUTPATIENT
Start: 2021-06-08 | End: 2021-06-08 | Stop reason: HOSPADM

## 2021-06-08 RX ORDER — NALOXONE HYDROCHLORIDE 0.4 MG/ML
0.4 INJECTION, SOLUTION INTRAMUSCULAR; INTRAVENOUS; SUBCUTANEOUS
Status: DISCONTINUED | OUTPATIENT
Start: 2021-06-08 | End: 2021-06-09 | Stop reason: HOSPADM

## 2021-06-08 RX ORDER — ONDANSETRON 2 MG/ML
4 INJECTION INTRAMUSCULAR; INTRAVENOUS EVERY 30 MIN PRN
Status: DISCONTINUED | OUTPATIENT
Start: 2021-06-08 | End: 2021-06-09 | Stop reason: HOSPADM

## 2021-06-08 RX ORDER — PROPOFOL 10 MG/ML
INJECTION, EMULSION INTRAVENOUS PRN
Status: DISCONTINUED | OUTPATIENT
Start: 2021-06-08 | End: 2021-06-08

## 2021-06-08 RX ORDER — OXYCODONE HYDROCHLORIDE 10 MG/1
10 TABLET ORAL 3 TIMES DAILY PRN
Status: ON HOLD
Start: 2021-06-08 | End: 2024-07-16

## 2021-06-08 RX ORDER — LIDOCAINE 50 MG/G
1 OINTMENT TOPICAL PRN
COMMUNITY
Start: 2020-11-08

## 2021-06-08 RX ORDER — LIDOCAINE HYDROCHLORIDE 20 MG/ML
JELLY TOPICAL PRN
Status: DISCONTINUED | OUTPATIENT
Start: 2021-06-08 | End: 2021-06-08 | Stop reason: HOSPADM

## 2021-06-08 RX ORDER — HYDROCODONE BITARTRATE AND ACETAMINOPHEN 5; 325 MG/1; MG/1
1-2 TABLET ORAL EVERY 6 HOURS PRN
Qty: 6 TABLET | Refills: 0 | Status: SHIPPED | OUTPATIENT
Start: 2021-06-08 | End: 2024-05-15

## 2021-06-08 RX ORDER — SILVER SULFADIAZINE 10 MG/G
CREAM TOPICAL PRN
Status: DISCONTINUED | OUTPATIENT
Start: 2021-06-08 | End: 2021-06-08 | Stop reason: HOSPADM

## 2021-06-08 RX ORDER — ALBUTEROL SULFATE 90 UG/1
1-2 AEROSOL, METERED RESPIRATORY (INHALATION) EVERY 4 HOURS PRN
COMMUNITY
Start: 2021-05-06

## 2021-06-08 RX ORDER — IBUPROFEN 600 MG/1
600 TABLET, FILM COATED ORAL ONCE
Status: DISCONTINUED | OUTPATIENT
Start: 2021-06-08 | End: 2021-06-09 | Stop reason: HOSPADM

## 2021-06-08 RX ADMIN — GLYCOPYRROLATE 0.2 MG: 0.2 INJECTION, SOLUTION INTRAMUSCULAR; INTRAVENOUS at 12:29

## 2021-06-08 RX ADMIN — OXYCODONE HYDROCHLORIDE 5 MG: 5 TABLET ORAL at 13:15

## 2021-06-08 RX ADMIN — PROPOFOL 100 MCG/KG/MIN: 10 INJECTION, EMULSION INTRAVENOUS at 12:22

## 2021-06-08 RX ADMIN — Medication 50 MCG: at 12:43

## 2021-06-08 RX ADMIN — ONDANSETRON 4 MG: 2 INJECTION INTRAMUSCULAR; INTRAVENOUS at 12:55

## 2021-06-08 RX ADMIN — SODIUM CHLORIDE, SODIUM LACTATE, POTASSIUM CHLORIDE, CALCIUM CHLORIDE: 600; 310; 30; 20 INJECTION, SOLUTION INTRAVENOUS at 12:06

## 2021-06-08 RX ADMIN — ACETAMINOPHEN 975 MG: 325 TABLET ORAL at 11:44

## 2021-06-08 RX ADMIN — PROPOFOL 50 MG: 10 INJECTION, EMULSION INTRAVENOUS at 12:22

## 2021-06-08 RX ADMIN — Medication 50 MCG: at 12:44

## 2021-06-08 RX ADMIN — PROPOFOL 30 MG: 10 INJECTION, EMULSION INTRAVENOUS at 12:46

## 2021-06-08 RX ADMIN — FENTANYL CITRATE 50 MCG: 50 INJECTION, SOLUTION INTRAMUSCULAR; INTRAVENOUS at 12:46

## 2021-06-08 RX ADMIN — LIDOCAINE HYDROCHLORIDE 40 MG: 20 INJECTION, SOLUTION INFILTRATION; PERINEURAL at 12:21

## 2021-06-08 RX ADMIN — FENTANYL CITRATE 50 MCG: 50 INJECTION, SOLUTION INTRAMUSCULAR; INTRAVENOUS at 12:20

## 2021-06-08 RX ADMIN — PROPOFOL 30 MG: 10 INJECTION, EMULSION INTRAVENOUS at 12:26

## 2021-06-08 NOTE — OP NOTE
Operative Note     Pre-operative diagnosis:   VAIN III (vaginal intraepithelial neoplasia III) [D07.2]   History of primary radiation therapy for cervical cancer    Post-operative diagnosis: same    Procedure(s):  Exam under anesthesia, vaginal biopsies, laser ablation of the upper vagina    Surgeon(s) and Role:     * Dany Perez MD - Primary    Anesthesia:   General    UOP: not measured    EBL: none    Drains: None    Specimen(s):  ID Type Source Tests Collected by Time Destination   A : right vaginal apex #1 Tissue Vagina SURGICAL PATHOLOGY EXAM Dany Perez MD 6/8/2021 12:38 PM    B : Right vaginal apex #2 Tissue Vagina SURGICAL PATHOLOGY EXAM aDny Perez MD 6/8/2021 12:38 PM    C : Midline vaginal apex Tissue Vagina SURGICAL PATHOLOGY EXAM Dany Perez MD 6/8/2021 12:39 PM    D : left vaginal apex Tissue Vagina SURGICAL PATHOLOGY EXAM Dany Perez MD 6/8/2021 12:40 PM        Findings: Vagina with significant radiation changes. It is pale and fibrotic. The vagina is foreshortened and cervix is not visible. There is necrotic tissue at the vaginal apex in the midline. There is evidence of prior biopsy at the right apex. There are no changes with application of Lugol's.      Indication: Alis Hartman is a 65 year old with a history of cervical cancer treated with primary radiation therapy with recurrent VAIN III presenting for treatment.     Description of procedure:   The patient was brought to the operating room. Anesthesia as above was administered. She was placed in dorsal lithotomy with stirrups. She was prepped and draped in the usual fashion. Surgical time out was performed as well as LASER time out. Chip/opaque speculum was placed and the vaginal cuff was visualized. The vagina was painted with Lugol's solution. A thorough vaginal exam was performed. This showed findings noted above. Biopsies were taken of the top of the vagina two at the right apex, one midline, and one at the  left. All areas were hemostatic. The areas of abnormality were then ablated using the CO2 LASER at 8-10W in a continuous fashion to the full thickness of the epithelium. After any areas of AWE were ablated, the upper 1cm of the vagina was ablated with focus at the right apex. Silvadene ointment was placed within the vagina and lidocaine jelly was also placed within the distal vagina. Local anesthetic was injected prior to biopsies. The speculum was removed. Patient was awakened, placed supine, and taken to the recovery area in stable condition. All counts were correct at the conclusion of the procedure. No complications were noted. The patient tolerated the procedure well.     Dany Perez MD     Gynecologic Oncology

## 2021-06-08 NOTE — ANESTHESIA POSTPROCEDURE EVALUATION
Patient: Alis Hartman    Procedure(s):  Exam under anesthesia, vaginal biopsies, laser ablation of the upper vagina  BIOPSY, VAGINA    Diagnosis:VAIN III (vaginal intraepithelial neoplasia III) [D07.2]  Diagnosis Additional Information: No value filed.    Anesthesia Type:  No value filed.    Note:  Disposition: Outpatient   Postop Pain Control: Uneventful            Sign Out: Well controlled pain   PONV: No   Neuro/Psych: Uneventful            Sign Out: Acceptable/Baseline neuro status   Airway/Respiratory: Uneventful            Sign Out: Acceptable/Baseline resp. status   CV/Hemodynamics: Uneventful            Sign Out: Acceptable CV status   Other NRE: NONE   DID A NON-ROUTINE EVENT OCCUR? No           Last vitals:  Vitals:    06/08/21 1306 06/08/21 1320 06/08/21 1338   BP: 109/52 (!) 91/36    Pulse:      Resp: 20 20 20   Temp: 36.3  C (97.4  F)     SpO2: 97% 98% 99%       Last vitals prior to Anesthesia Care Transfer:  CRNA VITALS  6/8/2021 1228 - 6/8/2021 1328      6/8/2021             NIBP:  (!) 81/46    NIBP Mean:  60          Electronically Signed By: Avery Washburn MD  June 8, 2021  2:46 PM

## 2021-06-08 NOTE — ANESTHESIA PREPROCEDURE EVALUATION
Anesthesia Pre-Procedure Evaluation    Patient: Alis Hartman   MRN: [id number] : 1955        Preoperative Diagnosis: VAIN III (vaginal intraepithelial neoplasia III) [D07.2]   Procedure : Procedure(s):  Exam under anesthesia, vaginal biopsies, laser ablation of the vagina  BIOPSY, VAGINA     Past Medical History:   Diagnosis Date     Depression      Hypertension      Malignant neoplasm of endocervix (H)     Tx with radiation     Other chronic pain     Low back     Urinary incontinence       Past Surgical History:   Procedure Laterality Date     ABDOMINOPLASTY       BIOPSY CERVICAL, LOCAL EXCISION, SINGLE/MULTIPLE  10/26/2011    Procedure:BIOPSY CERVICAL, LOCAL EXCISION, SINGLE/MULTIPLE; EUA, cervical biopsies; Surgeon:BETTY TINEO; Location:[facility] OR     EXAM UNDER ANESTHESIA PELVIC N/A 3/12/2020    Procedure: Exam under anesthsia, vaginal biopsies, possible CO2 laser of the vagina;  Surgeon: Lilliam Ryo MD;  [facility] OR     GI SURGERY      gastric bypass     LASER CO2 VAGINA N/A 3/2/2015    Procedure: LASER CO2 VAGINA;  Surgeon: Mariela Abdalla MD;  Location: [facility] OR     LASER CO2 VAGINA N/A 2019    Procedure: Exam under anesthesia, vaginal biopsies, CO2 laser of the vagina;  Surgeon: Lilliam Roy MD;  Location: [facility] OR      Allergies   Allergen Reactions     Ibuprofen Nausea and Vomiting     Shrimp      Sulfa Drugs Rash      Social History     Tobacco Use     Smoking status: Never Smoker     Smokeless tobacco: Never Used   Substance Use Topics     Alcohol use: Yes     Comment: Rare      Wt Readings from Last 1 Encounters:   21 99.7 kg (219 lb 12.8 oz)        Anesthesia Evaluation            ROS/MED HX  ENT/Pulmonary:  - neg pulmonary ROS     Neurologic:  - neg neurologic ROS     Cardiovascular: Comment: LE edema - neg cardiovascular ROS   (+) hypertension-----    METS/Exercise Tolerance:     Hematologic:  - neg hematologic  ROS     Musculoskeletal:  -  neg musculoskeletal ROS (+) arthritis,     GI/Hepatic:  - neg GI/hepatic ROS     Renal/Genitourinary:  - neg Renal ROS     Endo:  - neg endo ROS   (+) Obesity,     Psychiatric/Substance Use:  - neg psychiatric ROS     Infectious Disease:  - neg infectious disease ROS     Malignancy:  - neg malignancy ROS (+) Malignancy,     Other:  - neg other ROS    (+) , H/O Chronic Pain,        Physical Exam    Airway  airway exam normal      Mallampati: II   TM distance: > 3 FB   Neck ROM: full   Mouth opening: > 3 cm    Respiratory Devices and Support         Dental  no notable dental history         Cardiovascular          Rhythm and rate: regular and normal     Pulmonary   pulmonary exam normal        breath sounds clear to auscultation           OUTSIDE LABS:  CBC:   Lab Results   Component Value Date    WBC 3.9 (L) 01/26/2015    HGB 11.9 01/26/2015    HCT 37.5 01/26/2015     01/26/2015     BMP:   Lab Results   Component Value Date     01/26/2015    POTASSIUM 4.3 05/16/2019    POTASSIUM 4.6 01/26/2015    CHLORIDE 106 01/26/2015    CO2 31 01/26/2015    BUN 8 01/26/2015    CR 0.64 05/16/2019    CR 0.63 01/26/2015    GLC 92 05/16/2019    GLC 93 01/26/2015     COAGS: No results found for: PTT, INR, FIBR  POC: No results found for: BGM, HCG, HCGS  HEPATIC: No results found for: ALBUMIN, PROTTOTAL, ALT, AST, GGT, ALKPHOS, BILITOTAL, BILIDIRECT, STEWART  OTHER:   Lab Results   Component Value Date    SAMUEL 8.4 (L) 01/26/2015       Anesthesia Plan    ASA Status:  3   NPO Status:  NPO Appropriate    Anesthesia Type: MAC.     - Reason for MAC: immobility needed, straight local not clinically adequate   Induction: Propofol.   Maintenance: TIVA.        Consents    Anesthesia Plan(s) and associated risks, benefits, and realistic alternatives discussed. Questions answered and patient/representative(s) expressed understanding.     - Discussed with:  Patient      - Extended Intubation/Ventilatory Support Discussed: No.      -  Patient is DNR/DNI Status: No    Use of blood products discussed: No .     Postoperative Care    Pain management: Oral pain medications, IV analgesics, Multi-modal analgesia.   PONV prophylaxis: Ondansetron (or other 5HT-3), Background Propofol Infusion     Comments:                Avery Washburn MD

## 2021-06-08 NOTE — ANESTHESIA CARE TRANSFER NOTE
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    Anesthesia Type:   No value filed.     Note:    Oropharynx: oropharynx clear of all foreign objects  Level of Consciousness: awake  Oxygen Supplementation: room air    Independent Airway: airway patency satisfactory and stable  Dentition: dentition unchanged  Vital Signs Stable: post-procedure vital signs reviewed and stable  Report to RN Given: handoff report given  Patient transferred to: Phase II    Handoff Report: Identifed the Patient, Identified the Reponsible Provider, Reviewed the pertinent medical history, Discussed the surgical course, Reviewed Intra-OP anesthesia mangement and issues during anesthesia, Set expectations for post-procedure period and Allowed opportunity for questions and acknowledgement of understanding      Vitals: (Last set prior to Anesthesia Care Transfer)  CRNA VITALS  6/8/2021 1228 - 6/8/2021 1304      6/8/2021             NIBP:  (!) 81/46    NIBP Mean:  60        Electronically Signed By: ERINN Julio CRNA  June 8, 2021  1:04 PM

## 2021-06-08 NOTE — DISCHARGE INSTRUCTIONS
Gynecologic Discharge Instructions:  Light bleeding or spotting is normal after your procedure. You may have white or clear discharge, this is also normal. You may wear a pad as needed, do not use tampons or place anything within the vagina until cleared by your physician. Any bleeding that is heavy (like a heavy menstrual period) or heavy enough to soak through more than one pad, you should call our clinic or be seen in the nearest emergency room.     For pain, we recommend that you take scheduled ibuprofen for the first 48 hours after your procedure if you are able to take ibuprofen. If not, then you should take acetaminophen for the first 48 hrs. If these medications are not enough to keep your pain at a level that allows you to do usual activities (within reason), you may take opioid pain medication if some was prescribed to you. Do not take more than 3000mg of acetaminophen (combined between the Vicodin and Tylenol) in 24hrs.    You should avoid intense physical exercise first few days after your procedure. If you are feeling up to it, you may resume moderate physical activity 2 days after your procedure. If you notice any increase in pain or bleeding, you should decrease activity.     The results from surgery generally take 1-2 weeks to return. These will be discussed at your post-operative appointment unless another plan has been made with your physician. Be aware that due to recent national legislation requiring immediate access to medical results, you may receive your results via [employer] before your physician has had the chance to review them. We recognize that this can create stress or anxiety and lead to many questions. Your provider will discuss your results in detail with you at your post-operative appointment (unless another plan was made for results discussion before surgery). Our clinic nurses and staff generally do not have any additional information about the results that they are able to share  prior to that time.       [facility]  Nurse triage line: [phone]  Clinic scheduling: [phone]    For urgent or emergent concerns outside of clinic hours, either proceed to the nearest emergency room or contact the on-call physician by calling the [facility] at [phone] and asking to speak with the Ob/Gyn Resident Physician on call.        [facility]   Adult Discharge Orders & Instructions     For 24 hours after surgery    1. Get plenty of rest.  A responsible adult must stay with you for at least 24 hours after you leave the hospital.   2. Do not drive or use heavy equipment.  If you have weakness or tingling, don't drive or use heavy equipment until this feeling goes away.  3. Do not drink alcohol.  4. Avoid strenuous or risky activities.  Ask for help when climbing stairs.   5. You may feel lightheaded.  IF so, sit for a few minutes before standing.  Have someone help you get up.   6. If you have nausea (feel sick to your stomach): Drink only clear liquids such as apple juice, ginger ale, broth or 7-Up.  Rest may also help.  Be sure to drink enough fluids.  Move to a regular diet as you feel able.  7. You may have a slight fever. Call the doctor if your fever is over 100 F (37.7 C) (taken under the tongue) or lasts longer than 24 hours.  8. You may have a dry mouth, a sore throat, muscle aches or trouble sleeping.  These should go away after 24 hours.  9. Do not make important or legal decisions.     Call your doctor for any of the followin.  Signs of infection (fever, growing tenderness at the surgery site, a large amount of drainage or bleeding, severe pain, foul-smelling drainage, redness, swelling).    2. It has been over 8 to 10 hours since surgery and you are still not able to urinate (pass water).    3.  Headache for over 24 hours.    4.  Numbness, tingling or weakness the day after surgery (if you had spinal  anesthesia).                  5. Signs of Covid-19 infection (temperature over 100 degrees, shortness of breath, cough, loss of taste/smell, generalized body aches, persistent headache,                  chills, sore throat, nausea/vomiting/diarrhea).    Tylenol was given at 11:45 AM

## 2021-06-10 LAB — COPATH REPORT: NORMAL

## 2021-06-12 ENCOUNTER — HEALTH MAINTENANCE LETTER (OUTPATIENT)
Age: 66
End: 2021-06-12

## 2021-06-17 ENCOUNTER — PATIENT OUTREACH (OUTPATIENT)
Dept: ONCOLOGY | Facility: CLINIC | Age: 66
End: 2021-06-17

## 2021-06-17 ENCOUNTER — TELEPHONE (OUTPATIENT)
Dept: SURGERY | Facility: CLINIC | Age: 66
End: 2021-06-17

## 2021-06-17 NOTE — PROGRESS NOTES
[facility]:  Care Coordination Note    Referral to [facility] for hyperbaric oxygen therapy completed via telephone this afternoon (phone: [phone]) at Dr. Perez's request.  Recent office visit notes, telephone notes, operative report, and pathology reports faxed to this clinic at [phone].  They also requested copies of patient's previous radiation therapy treatments - however, appears this was done ~1996 at [facility], thus writer does not have access to these records (and they are not available in Care Everywhere).    Confirmed that someone from their scheduling office will call patient directly after they review her referral information.  Writer will continue to follow for further updates.     Nicholas Jarrell, RN, BSN, OCN  Oncology Care Coordinator  [facility]

## 2021-06-17 NOTE — TELEPHONE ENCOUNTER
"Gynecologic Oncology Established Patient Telephone Note  Enc Date: 21     Patient Name: Alis Hartman \"Aranza\"  Patient : 1955   [facility] MRN: [id number]     Aranza is a 65 year old with a history of cervical cancer treated with primary chemo-radiation many years ago. Since that time she has had multiple episodes of vaginal bleeding as well as recurrent high grade vaginal dysplasia, which has been treated with LASER ablation of the upper vagina. Biopsies have never shown cancer recurrence. Most recent episode of bleeding multiple biopsies of the upper vagina returned with vaginal necrosis and radiation fibrosis. Her VAIN III (pre-cancerous finding) was fully treated at the time of this procedure with laser ablation.    Given she has had multiple episodes of tissue breakdown and bleeding as well as pain, we discussed options of trying to treat this. We discussed the option of pentoxifylline and vitamin E, orally, which is a long-term (usually 2 years) treatment course with oral medications. We discussed no treatment with continued observation. We also discussed the option of referral for a consult with Hyperbaric Medicine to see if those physicians felt it might be beneficial for her condition.     She is interested in hyperbaric medicine referral. I will have our RN team initiate the referral. She has previously been treated at [facility], so I favor referral to their service.     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    - Return for surveillance exam in 6 months. She will need repeat Pap smear in 1 year.     Dany Perez MD    Gynecologic Oncology   "

## 2021-08-10 ENCOUNTER — PATIENT OUTREACH (OUTPATIENT)
Dept: ONCOLOGY | Facility: CLINIC | Age: 66
End: 2021-08-10

## 2021-08-10 NOTE — PROGRESS NOTES
[facility]:  Care Coordination Note    Writer placed telephone call to [facility] for Hyperbaric Oxygen Therapy this morning ([phone]), to follow-up on patient's referral and consult which was scheduled for 7/15/21.  Writer was informed that patient was a no-show to her consult appointment, and has not yet returned their phone calls to get rescheduled.      Writer then attempted telephone call to patient this afternoon, to follow-up on the referral and discuss if there is anything we can do to help her get the consult appointment rescheduled.  Patient did not answer, and voicemail box was full so writer was unable to leave message.  Will try reaching out to patient again later in the week.     Nicholas Jarrell, RN, BSN, OCN  Oncology Care Coordinator  [facility]

## 2021-08-17 NOTE — PROGRESS NOTES
[facility]:  Care Coordination Note    Writer was able to connect with patient via telephone this morning.  Patient states she forgot about the Hyperbaric Oxygen referral and consult that was scheduled with [facility].  Patient states she will call them now to get this rescheduled.  Writer provided patient with their scheduling phone number.  Writer will continue to follow for further updates.     Nicholas Jarrell RN, BSN, OCN  Oncology Care Coordinator  [facility]

## 2021-10-02 ENCOUNTER — HEALTH MAINTENANCE LETTER (OUTPATIENT)
Age: 66
End: 2021-10-02

## 2022-01-24 ENCOUNTER — ONCOLOGY VISIT (OUTPATIENT)
Dept: ONCOLOGY | Facility: CLINIC | Age: 67
End: 2022-01-24
Payer: MEDICARE

## 2022-01-24 VITALS
WEIGHT: 223.2 LBS | TEMPERATURE: 98.5 F | HEART RATE: 69 BPM | BODY MASS INDEX: 39.54 KG/M2 | OXYGEN SATURATION: 99 % | DIASTOLIC BLOOD PRESSURE: 91 MMHG | SYSTOLIC BLOOD PRESSURE: 175 MMHG | RESPIRATION RATE: 18 BRPM

## 2022-01-24 DIAGNOSIS — N39.0 URINARY TRACT INFECTION WITH HEMATURIA, SITE UNSPECIFIED: ICD-10-CM

## 2022-01-24 DIAGNOSIS — R31.9 URINARY TRACT INFECTION WITH HEMATURIA, SITE UNSPECIFIED: ICD-10-CM

## 2022-01-24 DIAGNOSIS — C53.9 MALIGNANT NEOPLASM OF CERVIX, UNSPECIFIED SITE (H): Primary | ICD-10-CM

## 2022-01-24 DIAGNOSIS — R30.0 DYSURIA: ICD-10-CM

## 2022-01-24 DIAGNOSIS — N95.2 ATROPHIC VAGINITIS: ICD-10-CM

## 2022-01-24 PROCEDURE — 99213 OFFICE O/P EST LOW 20 MIN: CPT | Performed by: OBSTETRICS & GYNECOLOGY

## 2022-01-24 RX ORDER — ESTRADIOL 0.1 MG/G
CREAM VAGINAL
Qty: 42.5 G | Refills: 3 | Status: SHIPPED | OUTPATIENT
Start: 2022-01-24 | End: 2023-02-21

## 2022-01-24 RX ORDER — PHENAZOPYRIDINE HYDROCHLORIDE 100 MG/1
100 TABLET, FILM COATED ORAL 3 TIMES DAILY PRN
Qty: 60 TABLET | Refills: 2 | Status: SHIPPED | OUTPATIENT
Start: 2022-01-24 | End: 2022-08-22

## 2022-01-24 ASSESSMENT — PAIN SCALES - GENERAL: PAINLEVEL: EXTREME PAIN (8)

## 2022-01-24 NOTE — PROGRESS NOTES
Gynecologic Oncology Clinic - Established Patient Visit    Visit date: Jan 24, 2022     CC: vaginal bleeding    Interval history: Alis Hartman is a 66 year old who has a history of cervix cancer treated with primary radiation who also has a history of VAIN III treated with laser. Her last laser ablation was in 6/2021.    She continues to have dysuria. She uses pyridium for this when needed. She also has vaginal dryness. She did not pursue hyperbaric treatment due to the difficulties with transportation to the [facility] which would be necessary for that therapy. She has no vaginal bleeding. No pain. No other discharge. She is not using any type of vaginal or vulvar cream/ointment, but she has used estradiol in the past and felt this was helpful at that time.     Relevant history:  3/29/96:  Cervix biopsy:  Moderately differentiated squamous cell carcinoma. She was treated with primary radiation therapy, unsure if she also had chemotherapy or not.  This treatment was at [facility].    10/26/11:  EUA colposcopy and biopsy                 Pathology: Cervix biopsy:  No dysplasia.  Uterus left apex:  Stroma hyalinization c/w irradiation.  Vagina upper biopsy: no dysplasia  8/12/13:  Upper vaginal wall:  VAIN1  12/22/14:  EUA, CO2 laser to the vagina, colposcopy of vagina                 Pathology:  VAIN3 at 3 o'clock  12/27/18:  Pap:  HSIL, HPV+  5/24/19: PROCEDURES: Vaginal colposcopy, vaginal biopsy, CO2 laser of the vagina  VAGINAL BIOPSY:   - Detached epithelium with high grade squamous intraepithelial lesion (vaginal intraepithelial neoplasia 3)   10/14/19: Pap HSIL/other HPV+  2/28/2020: biopsy of vagina Vain III; MRI recommended prior to OR; however, patient did not complete this  3/12/2020: EUA, biopsies, LASER: VAIN III  9/2020: Due for Pap smear with HPV-testing, patient cancelled appointment  1/2021: Vaginal colposcopy with no obvious areas of abnormality/lesions  6/8/2021: Exam under anesthesia, vaginal  biopsies, laser ablation of the upper vagina with Dr. Perez, biopsies returned showing necrosis. Hyperbaric oxygen therapy discussed and referral place. Patient elected not to proceed with consult.    Review of Systems:  As per HPI, otherwise non-contributory.     Past Medical History:  Past Medical History:   Diagnosis Date     Depression      Hypertension      Malignant neoplasm of endocervix (H)     Tx with radiation     Other chronic pain     Low back     Urinary incontinence        Past Surgical History:  Past Surgical History:   Procedure Laterality Date     ABDOMINOPLASTY       BIOPSY CERVICAL, LOCAL EXCISION, SINGLE/MULTIPLE  10/26/2011    Procedure:BIOPSY CERVICAL, LOCAL EXCISION, SINGLE/MULTIPLE; EUA, cervical biopsies; Surgeon:BETTY TINEO; Location:[facility] OR     BIOPSY VAGINAL N/A 6/8/2021    Procedure: BIOPSY, VAGINA;  Surgeon: Dany Perez MD;  Location: [facility] OR     EXAM UNDER ANESTHESIA PELVIC N/A 3/12/2020    Procedure: Exam under anesthsia, vaginal biopsies, possible CO2 laser of the vagina;  Surgeon: Lilliam Roy MD;  [facility]     GI SURGERY  2004    gastric bypass     LASER CO2 VAGINA N/A 3/2/2015    Procedure: LASER CO2 VAGINA;  Surgeon: Mariela Abdalla MD;  Location: [facility] OR     LASER CO2 VAGINA N/A 5/24/2019    Procedure: Exam under anesthesia, vaginal biopsies, CO2 laser of the vagina;  Surgeon: Lilliam Roy MD;  Location: [facility] OR     LASER CO2 VAGINA N/A 6/8/2021    Procedure: Exam under anesthesia, laser ablation of the upper vagina;  Surgeon: Dany Perez MD;  Location: [facility] OR        Physical Exam:  BP (!) 175/91 (BP Location: Right arm)   Pulse 69   Temp 98.5  F (36.9  C) (Oral)   Resp 18   Wt 101.2 kg (223 lb 3.2 oz)   SpO2 99%   BMI 39.54 kg/m     General appearance: no acute distress, well groomed, sitting comfortably   :  External: vulva/labia overall normal in appearance, some lichenification of the labia majora bilaterally  Vagina:  significant pallor of the mucosa extending out to the keratinized epithelium, there is some telangiectasias surrounding the urethra however, overall there is significant atrophy of the mucosal surfaces, within the vagina there is pallor of the skin and there is scarring with agglutiation of the upper vagina. No bleeding noted until further opening of the speculum, then some bleeding at right vaginal apex  Cervix: Unable to visualize due to agglutination of the upper vagina  Uterus/adnexa: not palpable due to stenosis of the vagina, there is no nodularity noted    Acetic acid was applied to the vagina and the area examined with no areas of concern noted.       Labs/Pathology:  none    Imaging review:  n/a    Assessment:  Aranza [Shira Hartman is a 66 year old  who has a history of cervix cancer treated with primary radiation who more recently has a history of recurrent VAIN III treated with laser on multiple occassions as well as vaginal necrosis.    Plan:  - No lesions on exam today. Return in 6 months.     - Vaginal burning and irritation: Likely a combination of atrophy and radiation fibrosis. Estradiol cream prescribed.     I spent a total of 20 minutes on the care of Alis Hartman on the day of service including 15 minutes face to face time and the remainder in chart review, care coordination, and documentation on the day of service.     Dany Perez MD     Gynecologic Oncology

## 2022-01-24 NOTE — NURSING NOTE
"Oncology Rooming Note    January 24, 2022 10:41 AM   Alis Hartman is a 66 year old female who presents for:    Chief Complaint   Patient presents with     Oncology Clinic Visit     6 month follow up     Initial Vitals: BP (!) 175/91 (BP Location: Right arm)   Pulse 69   Temp 98.5  F (36.9  C) (Oral)   Resp 18   Wt 101.2 kg (223 lb 3.2 oz)   SpO2 99%   BMI 39.54 kg/m   Estimated body mass index is 39.54 kg/m  as calculated from the following:    Height as of 1/18/21: 1.6 m (5' 3\").    Weight as of this encounter: 101.2 kg (223 lb 3.2 oz). Body surface area is 2.12 meters squared.  Extreme Pain (8) Comment: Data Unavailable   No LMP recorded. Patient is postmenopausal.  Allergies reviewed: Yes  Medications reviewed: Yes    Medications: Medication refills not needed today.  Pharmacy name entered into [employer] [facility]:    [id number] - [place], [place] - [address]  [id number] - [place] - [address] [id number] - [place], [place] - [address] [facility] [address] [facility] [address]    Clinical concerns: Concerned she may have UTI- burning with urination and urgency. No fevers.       Mabel Landaverde LPN              "

## 2022-03-24 ENCOUNTER — TRANSCRIBE ORDERS (OUTPATIENT)
Dept: OTHER | Age: 67
End: 2022-03-24

## 2022-03-24 DIAGNOSIS — G89.29 CHRONIC BILATERAL LOW BACK PAIN WITHOUT SCIATICA: Primary | ICD-10-CM

## 2022-03-24 DIAGNOSIS — M17.0 PRIMARY OSTEOARTHRITIS OF BOTH KNEES: Primary | ICD-10-CM

## 2022-03-24 DIAGNOSIS — M54.50 CHRONIC BILATERAL LOW BACK PAIN WITHOUT SCIATICA: Primary | ICD-10-CM

## 2022-05-27 NOTE — ANESTHESIA PREPROCEDURE EVALUATION
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]   Procedure(s):  Exam under anesthsia, vaginal biopsies, possible CO2 laser of the vagina     LABS:  CBC:   Lab Results   Component Value Date    WBC 3.9 (L) 2015    HGB 11.9 2015    HCT 37.5 2015     2015     BMP:   Lab Results   Component Value Date     2015    POTASSIUM 4.3 2019    POTASSIUM 4.6 2015    CHLORIDE 106 2015    CO2 31 2015    BUN 8 2015    CR 0.64 2019    CR 0.63 2015    GLC 92 2019    GLC 93 2015     COAGS: No results found for: PTT, INR, FIBR  POC: No results found for: BGM, HCG, HCGS  OTHER:   Lab Results   Component Value Date    SAMUEL 8.4 (L) 2015        Preop Vitals    BP Readings from Last 3 Encounters:   20 (!) 184/95   20 138/83   10/14/19 123/78    Pulse Readings from Last 3 Encounters:   20 64   20 70   10/14/19 82      Resp Readings from Last 3 Encounters:   20 18   20 16   10/14/19 18    SpO2 Readings from Last 3 Encounters:   20 100%   20 98%   10/14/19 97%      Temp Readings from Last 1 Encounters:   20 36.6  C (97.8  F) (Oral)    Ht Readings from Last 1 Encounters:   20 1.6 m (5' 3\")      Wt Readings from Last 1 Encounters:   20 98.7 kg (217 lb 11.2 oz)    Estimated body mass index is 38.56 kg/m  as calculated from the following:    Height as of 20: 1.6 m (5' 3\").    Weight as of 20: 98.7 kg (217 lb 11.2 oz).     LDA:  Peripheral IV 19 Right Upper forearm (Active)   Number of days: 293        Past Medical History:   Diagnosis Date     Depression      Hypertension      Malignant neoplasm of endocervix (H)     Tx with radiation     Other chronic pain     Low back     Urinary " [Normal] : soft, non-tender, non-distended, no masses palpated, no HSM and normal bowel sounds incontinence       Past Surgical History:   Procedure Laterality Date     ABDOMINOPLASTY       BIOPSY CERVICAL, LOCAL EXCISION, SINGLE/MULTIPLE  10/26/2011    Procedure:BIOPSY CERVICAL, LOCAL EXCISION, SINGLE/MULTIPLE; EUA, cervical biopsies; Surgeon:BETTY TINEO; Location:[facility]     GI SURGERY  2004    gastric bypass     LASER CO2 VAGINA N/A 3/2/2015    Procedure: LASER CO2 VAGINA;  Surgeon: Mariela Abdalla MD;  Location: [facility] OR     LASER CO2 VAGINA N/A 5/24/2019    Procedure: Exam under anesthesia, vaginal biopsies, CO2 laser of the vagina;  Surgeon: Lilliam Roy MD;  Location: [facility] OR      Allergies   Allergen Reactions     Ibuprofen Nausea and Vomiting     Shrimp      Sulfa Drugs Rash        Anesthesia Evaluation     . Pt has had prior anesthetic. Type: MAC           ROS/MED HX    ENT/Pulmonary:  - neg pulmonary ROS     Neurologic:  - neg neurologic ROS     Cardiovascular:     (+) hypertension----. : . . . :. .       METS/Exercise Tolerance:     Hematologic:  - neg hematologic  ROS       Musculoskeletal: Comment: Low back pain        GI/Hepatic: Comment: Gastric Bypass        Renal/Genitourinary:  - ROS Renal section negative       Endo:  - neg endo ROS       Psychiatric:     (+) psychiatric history depression and schizophrenia      Infectious Disease:  - neg infectious disease ROS       Malignancy:      - no malignancy   Other:    (+) H/O Chronic Pain,H/O chronic opiod use ,                        PHYSICAL EXAM:   Mental Status/Neuro: A/A/O   Airway: Facies: Feasible  Mallampati: I  Mouth/Opening: Full  TM distance: > 6 cm  Neck ROM: Full   Respiratory: Auscultation: CTAB     Resp. Rate: Normal     Resp. Effort: Normal      CV: Rhythm: Regular  Rate: Age appropriate  Heart: Normal Sounds  Edema: None   Comments:      Dental: Normal Dentition                Assessment:   ASA SCORE: 3    H&P: History and physical reviewed and following examination; no interval change.   Smoking Status:   Non-Smoker/Unknown   NPO Status: NPO Appropriate     Plan:   Anes. Type:  MAC   Pre-Medication: None   Induction:  N/a   Airway: Native Airway   Access/Monitoring: PIV   Maintenance: N/a     Postop Plan:   Postop Pain: None  Postop Sedation/Airway: Not planned  Disposition: Outpatient     PONV Management:   Adult Risk Factors: Female, Non-Smoker   Prevention: Ondansetron, Dexamethasone     CONSENT: Direct conversation   Plan and risks discussed with: Patient                      Terrence Lopez MD

## 2022-07-09 ENCOUNTER — HEALTH MAINTENANCE LETTER (OUTPATIENT)
Age: 67
End: 2022-07-09

## 2022-08-19 NOTE — PROGRESS NOTES
Gynecologic Oncology Clinic - Established Patient Visit    Visit date: Aug 22, 2022     Reason for visit: history of VAIN III    Interval history: Alis Hartman is a 67 year old who has a history of cervix cancer treated with primary radiation who also has a history of VAIN III treated with laser. Her last laser ablation was in 6/2021. She also has radiation induced necrosis.    Since her last visit she was evaluated due to hematuria. Per patient this is felt likely due to her radiation treatment. She said by the time she had work-up only microscopic blood was found in her urine. She has not had recurrence of the bleeding. No vaginal bleeding that she knows of, but due to this bleeding she wanted to be sure there wasn't anything occurring in the vagina.     She does report significant bladder pain. She reports that when her bladder is full she has severe burning sensation. This is present when the bladder is full, not necessarily burning as she urinates. She continues to take pyridium up to 3x daily when she has it available as she feels this helps. She has not tried anything else for this bladder pain. She does use the bathroom frequently. She drinks predominantly water. She doesn't feel any particular food or drink makes this worse it is really jsut related to having urine in the bladder.     Has some vaginal discharge. No vaginal bleeding. Aside from bladder pain she odesn't have any significant pain in the vaginal area.         Relevant history:  Oncology History   Cervical cancer (H)   3/1996 Initial Diagnosis    Cervical cancer    Moderately differentiated squamous cell carcinoma. She was treated with primary radiation therapy, unsure if she also had chemotherapy or not.  This treatment was at [facility].     12/22/2014 Procedure    Exam under anesthesia with LASER to the vagina for VAIN 3     5/24/2019 Procedure    12/27/18:  Pap:  HSIL, HPV+  5/24/19: PROCEDURES: Vaginal colposcopy, vaginal biopsy, CO2  laser of the vagina  VAGINAL BIOPSY:   - High grade squamous intraepithelial lesion (vaginal intraepithelial neoplasia 3)      3/12/2020 Procedure    10/14/19: Pap HSIL/other HPV+  2/28/2020: biopsy of vagina Vain III; MRI recommended prior to OR; however, patient did not complete this  3/12/2020: EUA, biopsies, LASER: VAIN III     6/8/2021 Procedure    6/8/2021: Exam under anesthesia, vaginal biopsies, laser ablation of the upper vagina with Dr. Perez, biopsies returned showing necrosis, no dysplasia. Hyperbaric oxygen therapy discussed and referral place. Patient was unable to pursue this due to need for transportation to the [facility].          Review of Systems:  As per HPI, otherwise non-contributory.     Past Surgical History:  Past Surgical History:   Procedure Laterality Date     ABDOMINOPLASTY       BIOPSY CERVICAL, LOCAL EXCISION, SINGLE/MULTIPLE  10/26/2011    Procedure:BIOPSY CERVICAL, LOCAL EXCISION, SINGLE/MULTIPLE; EUA, cervical biopsies; Surgeon:BETTY TINEO; [facility]     BIOPSY VAGINAL N/A 6/8/2021    Procedure: BIOPSY, VAGINA;  Surgeon: Dany Perez MD;  [facility]: [facility]     EXAM UNDER ANESTHESIA PELVIC N/A 3/12/2020    Procedure: Exam under anesthsia, vaginal biopsies, possible CO2 laser of the vagina;  Surgeon: Lilliam Roy MD;  [facility]     GI SURGERY  2004    gastric bypass     LASER CO2 VAGINA N/A 3/2/2015    Procedure: LASER CO2 VAGINA;  Surgeon: Mariela Abdalla MD;  [facility]     LASER CO2 VAGINA N/A 5/24/2019    Procedure: Exam under anesthesia, vaginal biopsies, CO2 laser of the vagina;  Surgeon: Lilliam Roy MD;  [facility]     LASER CO2 VAGINA N/A 6/8/2021    Procedure: Exam under anesthesia, laser ablation of the upper vagina;  Surgeon: Dany Perez MD;  [facility]        Physical Exam:  BP (!) 173/74 (BP Location: Right arm, Patient Position: Sitting)   Pulse 94   Temp 98.2  F (36.8  C) (Oral)   Wt 102.5 kg  (225 lb 14.4 oz)   SpO2 100%   BMI 40.02 kg/m     General appearance: no acute distress, well groomed, sitting comfortably   :  External: vulva/labia overall normal in appearance  Vagina: significant pallor of the mucosa extending out to the keratinized epithelium, there is some telangiectasias surrounding the urethra, within the vagina there is pallor of the skin and there is scarring with agglutiation of the upper vagina. Mild trauma from the speculum and Pap smear but otherwise no areas of bleeding  Cervix: Unable to visualize due to agglutination of the upper vagina  Uterus/adnexa: not palpable due to stenosis of the vagina, there is no nodularity noted      Vaginal colposcopy  The vagina was coated in Lugol's solution. The colposcope was used to examine all areas of the vaginal mucosa. Visualization is somewhat limited due to stenosis, but there are no areas of decreased uptake.     Vaginal Pap smear was collected.      Labs/Pathology:  none    Imaging review:  n/a    Assessment:  Aranza [Shira Hartman is a 67 year old  who has a history of cervix cancer treated with primary radiation who more recently has a history of recurrent VAIN III treated with laser on multiple occassions as well as vaginal necrosis.    Plan:  -History of cervical cancer/VAIN III: No lesions on exam today. Pap smear collected. Colposcopy without lesions. Provided Pap smear does not have high grade cells, no further work-up needed. If high grade, would discuss treatment.    -Bladder pain: This is likely due to radiation cystitis or effects of radiation. I have refilled pyridium, but since I do not know the safety of using this regularly/long term, I have placed a referral for Urology to discuss if there are other options for treating her pain.       Dany Perez MD     Gynecologic Oncology

## 2022-08-22 ENCOUNTER — ONCOLOGY VISIT (OUTPATIENT)
Dept: ONCOLOGY | Facility: CLINIC | Age: 67
End: 2022-08-22
Attending: OBSTETRICS & GYNECOLOGY
Payer: COMMERCIAL

## 2022-08-22 VITALS
TEMPERATURE: 98.2 F | OXYGEN SATURATION: 100 % | BODY MASS INDEX: 40.02 KG/M2 | WEIGHT: 225.9 LBS | DIASTOLIC BLOOD PRESSURE: 74 MMHG | HEART RATE: 94 BPM | SYSTOLIC BLOOD PRESSURE: 173 MMHG

## 2022-08-22 DIAGNOSIS — D07.2 VAIN III (VAGINAL INTRAEPITHELIAL NEOPLASIA III): Primary | ICD-10-CM

## 2022-08-22 DIAGNOSIS — R30.0 DYSURIA: ICD-10-CM

## 2022-08-22 PROCEDURE — G0145 SCR C/V CYTO,THINLAYER,RESCR: HCPCS | Performed by: OBSTETRICS & GYNECOLOGY

## 2022-08-22 PROCEDURE — 87624 HPV HI-RISK TYP POOLED RSLT: CPT | Performed by: OBSTETRICS & GYNECOLOGY

## 2022-08-22 PROCEDURE — G0124 SCREEN C/V THIN LAYER BY MD: HCPCS | Performed by: PATHOLOGY

## 2022-08-22 PROCEDURE — 57420 EXAM OF VAGINA W/SCOPE: CPT | Performed by: OBSTETRICS & GYNECOLOGY

## 2022-08-22 PROCEDURE — 99213 OFFICE O/P EST LOW 20 MIN: CPT | Mod: 25 | Performed by: OBSTETRICS & GYNECOLOGY

## 2022-08-22 RX ORDER — PHENAZOPYRIDINE HYDROCHLORIDE 100 MG/1
100 TABLET, FILM COATED ORAL 3 TIMES DAILY PRN
Qty: 90 TABLET | Refills: 3 | Status: SHIPPED | OUTPATIENT
Start: 2022-08-22 | End: 2022-12-14

## 2022-08-22 ASSESSMENT — PAIN SCALES - GENERAL: PAINLEVEL: EXTREME PAIN (9)

## 2022-08-22 NOTE — LETTER
8/22/2022         RE: Alis Hartman  [address]  [place] [address]        Dear Colleague,    Thank you for referring your patient, Alis Hartman, to the [facility]. Please see a copy of my visit note below.    Gynecologic Oncology Clinic - Established Patient Visit    Visit date: Aug 22, 2022     Reason for visit: history of VAIN III    Interval history: Alis Hartman is a 67 year old who has a history of cervix cancer treated with primary radiation who also has a history of VAIN III treated with laser. Her last laser ablation was in 6/2021. She also has radiation induced necrosis.    Since her last visit she was evaluated due to hematuria. Per patient this is felt likely due to her radiation treatment. She said by the time she had work-up only microscopic blood was found in her urine. She has not had recurrence of the bleeding. No vaginal bleeding that she knows of, but due to this bleeding she wanted to be sure there wasn't anything occurring in the vagina.     She does report significant bladder pain. She reports that when her bladder is full she has severe burning sensation. This is present when the bladder is full, not necessarily burning as she urinates. She continues to take pyridium up to 3x daily when she has it available as she feels this helps. She has not tried anything else for this bladder pain. She does use the bathroom frequently. She drinks predominantly water. She doesn't feel any particular food or drink makes this worse it is really jsut related to having urine in the bladder.     Has some vaginal discharge. No vaginal bleeding. Aside from bladder pain she odesn't have any significant pain in the vaginal area.         Relevant history:  Oncology History   Cervical cancer (H)   3/1996 Initial Diagnosis    Cervical cancer    Moderately differentiated squamous cell carcinoma. She was treated with primary radiation therapy, unsure  if she also had chemotherapy or not.  This treatment was at [facility].     12/22/2014 Procedure    Exam under anesthesia with LASER to the vagina for VAIN 3     5/24/2019 Procedure    12/27/18:  Pap:  HSIL, HPV+  5/24/19: PROCEDURES: Vaginal colposcopy, vaginal biopsy, CO2 laser of the vagina  VAGINAL BIOPSY:   - High grade squamous intraepithelial lesion (vaginal intraepithelial neoplasia 3)      3/12/2020 Procedure    10/14/19: Pap HSIL/other HPV+  2/28/2020: biopsy of vagina Vain III; MRI recommended prior to OR; however, patient did not complete this  3/12/2020: EUA, biopsies, LASER: VAIN III     6/8/2021 Procedure    6/8/2021: Exam under anesthesia, vaginal biopsies, laser ablation of the upper vagina with Dr. Perez, biopsies returned showing necrosis, no dysplasia. Hyperbaric oxygen therapy discussed and referral place. Patient was unable to pursue this due to need for transportation to the [facility].          Review of Systems:  As per HPI, otherwise non-contributory.     Past Surgical History:  Past Surgical History:   Procedure Laterality Date     ABDOMINOPLASTY       BIOPSY CERVICAL, LOCAL EXCISION, SINGLE/MULTIPLE  10/26/2011    Procedure:BIOPSY CERVICAL, LOCAL EXCISION, SINGLE/MULTIPLE; EUA, cervical biopsies; Surgeon:BETTY TINEO; [facility]     BIOPSY VAGINAL N/A 6/8/2021    Procedure: BIOPSY, VAGINA;  Surgeon: Dany Perez MD;  [facility]     EXAM UNDER ANESTHESIA PELVIC N/A 3/12/2020    Procedure: Exam under anesthsia, vaginal biopsies, possible CO2 laser of the vagina;  Surgeon: Lilliam Roy MD;  [facility]     GI SURGERY  2004    gastric bypass     LASER CO2 VAGINA N/A 3/2/2015    Procedure: LASER CO2 VAGINA;  Surgeon: Mariela Abdalla MD;  [facility]     LASER CO2 VAGINA N/A 5/24/2019    Procedure: Exam under anesthesia, vaginal biopsies, CO2 laser of the vagina;  Surgeon: Lilliam Roy MD;  [facility]     LASER CO2 VAGINA N/A 6/8/2021     Procedure: Exam under anesthesia, laser ablation of the upper vagina;  Surgeon: Dany Perez MD;  [facility]        Physical Exam:  BP (!) 173/74 (BP Location: Right arm, Patient Position: Sitting)   Pulse 94   Temp 98.2  F (36.8  C) (Oral)   Wt 102.5 kg (225 lb 14.4 oz)   SpO2 100%   BMI 40.02 kg/m     General appearance: no acute distress, well groomed, sitting comfortably   :  External: vulva/labia overall normal in appearance  Vagina: significant pallor of the mucosa extending out to the keratinized epithelium, there is some telangiectasias surrounding the urethra, within the vagina there is pallor of the skin and there is scarring with agglutiation of the upper vagina. Mild trauma from the speculum and Pap smear but otherwise no areas of bleeding  Cervix: Unable to visualize due to agglutination of the upper vagina  Uterus/adnexa: not palpable due to stenosis of the vagina, there is no nodularity noted      Vaginal colposcopy  The vagina was coated in Lugol's solution. The colposcope was used to examine all areas of the vaginal mucosa. Visualization is somewhat limited due to stenosis, but there are no areas of decreased uptake.     Vaginal Pap smear was collected.      Labs/Pathology:  none    Imaging review:  n/a    Assessment:  Aranza [Alis] AMINAH Hartman is a 67 year old  who has a history of cervix cancer treated with primary radiation who more recently has a history of recurrent VAIN III treated with laser on multiple occassions as well as vaginal necrosis.    Plan:  -History of cervical cancer/VAIN III: No lesions on exam today. Pap smear collected. Colposcopy without lesions. Provided Pap smear does not have high grade cells, no further work-up needed. If high grade, would discuss treatment.    -Bladder pain: This is likely due to radiation cystitis or effects of radiation. I have refilled pyridium, but since I do not know the safety of using this regularly/long term, I have placed a  referral for Urology to discuss if there are other options for treating her pain.       Dany Perez MD     Gynecologic Oncology       Again, thank you for allowing me to participate in the care of your patient.        Sincerely,        Dany Perez MD

## 2022-08-24 LAB
BKR LAB AP GYN ADEQUACY: ABNORMAL
BKR LAB AP GYN INTERPRETATION: ABNORMAL
BKR LAB AP HPV REFLEX: ABNORMAL
BKR LAB AP PREVIOUS ABNL DX: ABNORMAL
BKR LAB AP PREVIOUS ABNORMAL: ABNORMAL
PATH REPORT.COMMENTS IMP SPEC: ABNORMAL
PATH REPORT.COMMENTS IMP SPEC: ABNORMAL
PATH REPORT.RELEVANT HX SPEC: ABNORMAL

## 2022-08-26 LAB
HUMAN PAPILLOMA VIRUS 16 DNA: NEGATIVE
HUMAN PAPILLOMA VIRUS 18 DNA: NEGATIVE
HUMAN PAPILLOMA VIRUS FINAL DIAGNOSIS: ABNORMAL
HUMAN PAPILLOMA VIRUS OTHER HR: POSITIVE

## 2022-09-03 ENCOUNTER — HEALTH MAINTENANCE LETTER (OUTPATIENT)
Age: 67
End: 2022-09-03

## 2022-10-13 ENCOUNTER — PATIENT OUTREACH (OUTPATIENT)
Dept: ONCOLOGY | Facility: CLINIC | Age: 67
End: 2022-10-13

## 2022-10-13 DIAGNOSIS — N90.89 VULVAR IRRITATION: ICD-10-CM

## 2022-10-13 RX ORDER — NYSTATIN 100000 U/G
OINTMENT TOPICAL
Qty: 30 G | Refills: 3 | Status: SHIPPED | OUTPATIENT
Start: 2022-10-13 | End: 2023-04-19

## 2022-10-13 RX ORDER — TRIAMCINOLONE ACETONIDE 1 MG/G
OINTMENT TOPICAL
Qty: 30 G | Refills: 3 | Status: SHIPPED | OUTPATIENT
Start: 2022-10-13 | End: 2024-07-21

## 2022-10-13 NOTE — PROGRESS NOTES
Dr. Perez sent in 2 prescriptions for patient to help with her vaginal burning pain (Nystatin and Triamcinolone).  Prescriptions were sent to patient's [facility] pharmacy in [place].  Called pt and updated her on Dr. Perez's recommendations.  Pt was happy with plan to try the prescriptions.  Encouraged patient to call us back if the prescriptions don't help.  Pt voiced understanding.  No further questions or concerns at this time.    Deja Willams, RN, BSN    Triage Nurse Advisor  [facility]/[place]/Kathrin Stewart  [phone]

## 2022-10-13 NOTE — PROGRESS NOTES
Nicholas Jarrell, RN  P  Cancer Triage  Cc: Nicholas Jarrell, RN  Hello,     We received a voicemail message from this patient today (around 9:00 am), requesting a callback to discuss symptoms.  She reports a burning sensation in her vulva/vaginal area, worse with urination.  Would someone be able to call her and further assess?       She left a callback number of [phone].     Thank you,   Nicholas     Called patient to follow up with her regarding her symptoms.  Pt states that she has been having a burning sensation her her vulva/vaginal area since 8/22/22 when she last saw Dr. Perez.  Pt states that the pain is constant and rates it 8/10.  She denies any vaginal discharge or bleeding.  She said she does have itching in her vagina.  She denies any vaginal odors.  She said the vaginal/vulva burning pain is worse with urination.  Pt said she takes Pyridium for bladder pain and states that this pain is different than that.  Patient said she feels she needs a cream or ointment for her vagina/vulva.  She said she has tried vasoline to the area without relief.      Writer will route message to Dr. Perez for advise/recommendations and will call patient back with plan.  Writer will also route message to Dr. Ana Peterson's RNCC, to update.    Deja Willams, RN, BSN    Triage Nurse Advisor  [facility]/[place]/[facility]rin Stewart  [phone]

## 2022-12-13 DIAGNOSIS — R30.0 DYSURIA: ICD-10-CM

## 2022-12-13 NOTE — TELEPHONE ENCOUNTER
SUBJECTIVE/OBJECTIVE:                                                    Refill request receive for:  Phenazopyridine    Last refill per fax: 10/29/22  Date of LOV r/t Medication: 8/22/22  Future appt scheduled? Yes: 2/21/23   Date faxed to clinic: 12/9/22    PLAN:                                                      Sent to provider to advise.

## 2022-12-14 RX ORDER — PHENAZOPYRIDINE HYDROCHLORIDE 100 MG/1
100 TABLET, FILM COATED ORAL 3 TIMES DAILY PRN
Qty: 90 TABLET | Refills: 3 | Status: SHIPPED | OUTPATIENT
Start: 2022-12-14 | End: 2023-02-21

## 2023-02-15 ENCOUNTER — OFFICE VISIT (OUTPATIENT)
Dept: UROLOGY | Facility: CLINIC | Age: 68
End: 2023-02-15
Attending: OBSTETRICS & GYNECOLOGY
Payer: COMMERCIAL

## 2023-02-15 VITALS — SYSTOLIC BLOOD PRESSURE: 187 MMHG | OXYGEN SATURATION: 100 % | HEART RATE: 84 BPM | DIASTOLIC BLOOD PRESSURE: 79 MMHG

## 2023-02-15 DIAGNOSIS — R30.0 DYSURIA: ICD-10-CM

## 2023-02-15 DIAGNOSIS — Z85.41 HISTORY OF CERVICAL CANCER: ICD-10-CM

## 2023-02-15 DIAGNOSIS — N32.81 OAB (OVERACTIVE BLADDER): Primary | ICD-10-CM

## 2023-02-15 LAB
ALBUMIN UR-MCNC: NEGATIVE MG/DL
APPEARANCE UR: CLEAR
BACTERIA #/AREA URNS HPF: ABNORMAL /HPF
BILIRUB UR QL STRIP: NEGATIVE
COLOR UR AUTO: YELLOW
GLUCOSE UR STRIP-MCNC: NEGATIVE MG/DL
HGB UR QL STRIP: ABNORMAL
KETONES UR STRIP-MCNC: NEGATIVE MG/DL
LEUKOCYTE ESTERASE UR QL STRIP: ABNORMAL
NITRATE UR QL: POSITIVE
PH UR STRIP: 7 [PH] (ref 5–7)
RBC #/AREA URNS AUTO: ABNORMAL /HPF
SP GR UR STRIP: 1.01 (ref 1–1.03)
SQUAMOUS #/AREA URNS AUTO: ABNORMAL /LPF
UROBILINOGEN UR STRIP-ACNC: 1 E.U./DL
WBC #/AREA URNS AUTO: ABNORMAL /HPF
WBC CLUMPS #/AREA URNS HPF: PRESENT /HPF

## 2023-02-15 PROCEDURE — 52000 CYSTOURETHROSCOPY: CPT | Performed by: UROLOGY

## 2023-02-15 PROCEDURE — 81001 URINALYSIS AUTO W/SCOPE: CPT | Performed by: UROLOGY

## 2023-02-15 PROCEDURE — 99205 OFFICE O/P NEW HI 60 MIN: CPT | Mod: 25 | Performed by: UROLOGY

## 2023-02-15 RX ORDER — NITROFURANTOIN 25; 75 MG/1; MG/1
100 CAPSULE ORAL 2 TIMES DAILY
Qty: 10 CAPSULE | Refills: 0 | Status: SHIPPED | OUTPATIENT
Start: 2023-02-15 | End: 2023-04-19

## 2023-02-15 NOTE — PATIENT INSTRUCTIONS
Please call [facility] to schedule a CT scan. [phone]  We will contact you with results.  Please contact your insurance company to make sure the CT scan is covered under your insurance plan.   Please call our office if you have questions or need to report any information, [phone].

## 2023-02-15 NOTE — PROGRESS NOTES
"Alis Hartman is a 67 year old female seen in consultation for urinary frequency. Consult from Selisker, Helen.      Pt c/o voiding \"at least every hour\" during the day and \"up to every 5-10 minutes\" at nite. Very bothersome.    Also mixed UI, stress > urge, wears 3 pads during the day and one at nite.    Uses Pyridium TID for intermittent dysuria; has not taken it \"for some time.\" No dysuria today.    Denies gross hematuria today although she has hx vag bleeding.    Hx ÁLVARO III of cervix with SCCA rx with primary radiation followed by laser abalation in 6/21, also radiation-induced necrosis.     Denies prior  eval, hx bladder surgery, use of bladder control meds.    Hx 2 vag deliveries, no hyster. Not on HRT. Denies constiaption.    Drinks 2 caf coffee, 1 caf soda, 5-6 glasses of water + 5 glasses of ice; \"I like to eat.\" Hx morbid obesity and gastric bypass noted.     Reviewed PMH in detail including depression, HTN, chronic pain with opioid dependence, gastric bypass, paranoid schizophrenia, vaginal pain and bleeding, morbid obesity, low back pain, lower extremity edema      Past Medical History:   Diagnosis Date     Depression      Hypertension      Malignant neoplasm of endocervix (H)     Tx with radiation     Other chronic pain     Low back     Urinary incontinence        Past Surgical History:   Procedure Laterality Date     ABDOMINOPLASTY       BIOPSY CERVICAL, LOCAL EXCISION, SINGLE/MULTIPLE  10/26/2011    Procedure:BIOPSY CERVICAL, LOCAL EXCISION, SINGLE/MULTIPLE; EUA, cervical biopsies; Surgeon:BETTY TINEO; [facility]     BIOPSY VAGINAL N/A 6/8/2021    Procedure: BIOPSY, VAGINA;  Surgeon: Dany Perez MD;  [facility]: [facility] OR     EXAM UNDER ANESTHESIA PELVIC N/A 3/12/2020    Procedure: Exam under anesthsia, vaginal biopsies, possible CO2 laser of the vagina;  Surgeon: Lilliam Roy MD;  [facility]     GI SURGERY  2004    gastric bypass     LASER CO2 VAGINA N/A 3/2/2015 "    Procedure: LASER CO2 VAGINA;  Surgeon: Mariela Abdalla MD;  [facility] OR     LASER CO2 VAGINA N/A 5/24/2019    Procedure: Exam under anesthesia, vaginal biopsies, CO2 laser of the vagina;  Surgeon: Lilliam Roy MD;  [facility]     LASER CO2 VAGINA N/A 6/8/2021    Procedure: Exam under anesthesia, laser ablation of the upper vagina;  Surgeon: Dany Perez MD;  Location: [facility] OR       Social History     Socioeconomic History     Marital status: Single     Spouse name: Not on file     Number of children: Not on file     Years of education: Not on file     Highest education level: Not on file   Occupational History     Not on file   Tobacco Use     Smoking status: Never     Smokeless tobacco: Never   Substance and Sexual Activity     Alcohol use: Yes     Comment: Rare     Drug use: No     Sexual activity: Not on file   Other Topics Concern     Parent/sibling w/ CABG, MI or angioplasty before 65F 55M? Not Asked   Social History Narrative     Not on file     Social Determinants of Health     Financial Resource Strain: Not on file   Food Insecurity: Not on file   Transportation Needs: Not on file   Physical Activity: Not on file   Stress: Not on file   Social Connections: Not on file   Intimate Partner Violence: Not on file   Housing Stability: Not on file       Current Outpatient Medications   Medication Sig Dispense Refill     acetaminophen (TYLENOL) 500 MG tablet Take 1,000 mg by mouth       albuterol (PROAIR HFA/PROVENTIL HFA/VENTOLIN HFA) 108 (90 Base) MCG/ACT inhaler Inhale 1-2 puffs into the lungs as needed       amLODIPine (NORVASC) 5 MG tablet Take 10 mg by mouth daily       calcium Citrate-vitamin D 500-400 MG-UNIT CHEW Take 1 tablet by mouth       childrens multivitamin w/iron (FLINTSTONES COMPLETE) 60 MG chewable tablet Take 2 tablets by mouth       cholecalciferol 125 MCG (5000 UT) CAPS Take 5000 mg 4 times a week       docusate sodium (COLACE) 100 MG capsule Take 100 mg by mouth        estradiol (ESTRACE) 0.1 MG/GM vaginal cream Insert 1-2g into the vagina at bedtime every night for 2 weeks then 1-2 nights a week thereafter. 42.5 g 3     hydrochlorothiazide (HYDRODIURIL) 25 MG tablet Take 25 mg by mouth       HYDROcodone-acetaminophen (NORCO) 5-325 MG tablet Take 1-2 tablets by mouth every 6 hours as needed for moderate to severe pain 6 tablet 0     lidocaine (XYLOCAINE) 5 % external ointment Apply 1 Application topically as needed       lidocaine-prilocaine (EMLA) 2.5-2.5 % external cream        LORazepam (ATIVAN) 1 MG tablet Take 1 tablet (1 mg) by mouth every 6 hours as needed for anxiety 2 tablet 0     Multiple Vitamin (MULTI-VITAMIN) per tablet Take 1 tablet by mouth daily.       NARCAN 4 MG/0.1ML nasal spray TK UTD PER PACKAGE INSERT  0     nystatin (MYCOSTATIN) 263559 UNIT/GM external ointment Apply a pea sized amount to the external skin and vaginal opening as needed. May use up to 1-2 times daily. Mix with triamcinolone. 30 g 3     oxyCODONE IR (ROXICODONE) 10 MG tablet Take 1 tablet (10 mg) by mouth 3 times daily as needed Do not take while taking Vicodin.       phenazopyridine (PYRIDIUM) 100 MG tablet Take 1 tablet (100 mg) by mouth 3 times daily as needed for urinary tract discomfort 90 tablet 3     sertraline (ZOLOFT) 25 MG tablet Take 1 tablet by mouth daily.       triamcinolone (KENALOG) 0.1 % external ointment Apply a pea sized amount to the vulva and vaginal opening as needed for irritation. May mix with nystatin, but do not use more than 1x daily 30 g 3     zolpidem (AMBIEN) 10 MG tablet Take 10 mg by mouth         Physical Exam:    GENL: NAD.    ABD: Soft, non-tender, no masses.    EG: Poorly-estrogenized, no masses.    VAGINA: stenotic, no clear masses   BN HYPERMOBILITY: None.    CYSTOCELE: Grade 1.    APICAL PROLAPSE: Minimal.    RECTOCELE: Minimal.    BIMANUAL: No mass or tenderness.    Cysto:    (Informed consent obtained. Pause for cause performed)   Sterile prep.     17 Fr scope inserted through urethra. Systematic examination w 70 degree lens.   PVR: 15 cc   MUCOSA: Mild diffuse scattered erythema   ORIFICES: Normal location and morphology   CAPACITY: 150 cc; pt fairly uncomfortable at that point   Scope withdrawn without untoward effect.      (Pt tolerated procedure without difficulty).      Results for orders placed or performed in visit on 02/15/23   UA reflex to Microscopic     Status: Abnormal   Result Value Ref Range    Color Urine Yellow Colorless, Straw, Light Yellow, Yellow    Appearance Urine Clear Clear    Glucose Urine Negative Negative mg/dL    Bilirubin Urine Negative Negative    Ketones Urine Negative Negative mg/dL    Specific Gravity Urine 1.015 1.003 - 1.035    Blood Urine Large (A) Negative    pH Urine 7.0 5.0 - 7.0    Protein Albumin Urine Negative Negative mg/dL    Urobilinogen Urine 1.0 0.2, 1.0 E.U./dL    Nitrite Urine Positive (A) Negative    Leukocyte Esterase Urine Small (A) Negative   '        IMP:  1. Urinary urgency/frequency/mixed urinary incontinence 15 yrs after pelvic XRT for SCCA cervix; bladder capacity 150 ml today under cysto    2. Large fluid consumption    3. Probable UTI    4. Other medical issues        PLAN:  1. Discussed situation with patient in detail.  2. UC; emperic rx with Macrobid x 5 d  3. Consider some moderation of fluids  4. Consider trial Detrol LA4; discussed rationale, mech of action, potential side effect, etc; pt expresses understanding, elects trial  5. CT abd/pelvis (given hx SCCA and very small bladder capacity)  6. RTC 2 mos to review progress; pt may come to Botox at some point  7. Set realistic expectations  8. Total time spent in reviewing patient records, discussing history, performing exam, discussing diagnosis, outlining treatment plan and documentation: 60 minutes

## 2023-02-16 RX ORDER — TOLTERODINE 4 MG/1
4 CAPSULE, EXTENDED RELEASE ORAL DAILY
Qty: 90 CAPSULE | Refills: 0 | Status: ON HOLD | OUTPATIENT
Start: 2023-02-16 | End: 2024-07-16

## 2023-02-21 ENCOUNTER — ONCOLOGY VISIT (OUTPATIENT)
Dept: ONCOLOGY | Facility: CLINIC | Age: 68
End: 2023-02-21
Attending: OBSTETRICS & GYNECOLOGY
Payer: COMMERCIAL

## 2023-02-21 VITALS
DIASTOLIC BLOOD PRESSURE: 87 MMHG | HEIGHT: 63 IN | WEIGHT: 218 LBS | HEART RATE: 86 BPM | SYSTOLIC BLOOD PRESSURE: 190 MMHG | BODY MASS INDEX: 38.62 KG/M2 | OXYGEN SATURATION: 100 %

## 2023-02-21 DIAGNOSIS — R30.0 DYSURIA: ICD-10-CM

## 2023-02-21 DIAGNOSIS — N95.2 ATROPHIC VAGINITIS: ICD-10-CM

## 2023-02-21 PROCEDURE — 99214 OFFICE O/P EST MOD 30 MIN: CPT | Performed by: OBSTETRICS & GYNECOLOGY

## 2023-02-21 RX ORDER — ESTRADIOL 0.1 MG/G
CREAM VAGINAL
Qty: 42.5 G | Refills: 3 | Status: SHIPPED | OUTPATIENT
Start: 2023-02-21 | End: 2024-03-01

## 2023-02-21 RX ORDER — PHENAZOPYRIDINE HYDROCHLORIDE 100 MG/1
100 TABLET, FILM COATED ORAL 3 TIMES DAILY PRN
Qty: 90 TABLET | Refills: 3 | Status: ON HOLD | OUTPATIENT
Start: 2023-02-21 | End: 2024-08-09

## 2023-02-21 ASSESSMENT — PAIN SCALES - GENERAL: PAINLEVEL: NO PAIN (0)

## 2023-02-21 NOTE — PROGRESS NOTES
Gynecologic Oncology Clinic - Established Patient Visit    Visit date: Feb 21, 2023     Reason for visit: history of VAIN III    Interval history: Alis Hartman is a 67 year old who has a history of cervix cancer treated with primary radiation who also has a history of VAIN III treated with laser. Her last laser ablation was in 6/2021. She also has radiation induced necrosis.    Since her last visit Hannah has seen urology.  She is following the recommendations made by their team.  She does continue to use Pyridium.  She reports that expressed no concerns about this at her visit.  She has no episodes of vaginal bleeding.  She continues to have her baseline dysuria.  No vaginal discharge.  She is using topical steroid ointment as needed.  She is using this at most 1-2 times weekly.  She does also report use of vaginal estrogen.  She feels that this is made a significant difference.        Relevant history:  Oncology History   Cervical cancer (H)   3/1996 Initial Diagnosis    Cervical cancer    Moderately differentiated squamous cell carcinoma. She was treated with primary radiation therapy, unsure if she also had chemotherapy or not.  This treatment was at [facility].     12/22/2014 Procedure    Exam under anesthesia with LASER to the vagina for VAIN 3     5/24/2019 Procedure    12/27/18:  Pap:  HSIL, HPV+  5/24/19: PROCEDURES: Vaginal colposcopy, vaginal biopsy, CO2 laser of the vagina  VAGINAL BIOPSY:   - High grade squamous intraepithelial lesion (vaginal intraepithelial neoplasia 3)      3/12/2020 Procedure    10/14/19: Pap HSIL/other HPV+  2/28/2020: biopsy of vagina Vain III; MRI recommended prior to OR; however, patient did not complete this  3/12/2020: EUA, biopsies, LASER: VAIN III     6/8/2021 Procedure    6/8/2021: Exam under anesthesia, vaginal biopsies, laser ablation of the upper vagina with Dr. Perez, biopsies returned showing necrosis, no dysplasia. Hyperbaric oxygen therapy discussed and referral  "place. Patient was unable to pursue this due to need for transportation to the [facility].          Review of Systems:  As per HPI, otherwise non-contributory.     Past Surgical History:  Past Surgical History:   Procedure Laterality Date     ABDOMINOPLASTY       BIOPSY CERVICAL, LOCAL EXCISION, SINGLE/MULTIPLE  10/26/2011    Procedure:BIOPSY CERVICAL, LOCAL EXCISION, SINGLE/MULTIPLE; EUA, cervical biopsies; Surgeon:BETTY TINEO; Location:[facility]     BIOPSY VAGINAL N/A 6/8/2021    Procedure: BIOPSY, VAGINA;  Surgeon: Dany Perez MD;  Location: [facility] OR     EXAM UNDER ANESTHESIA PELVIC N/A 3/12/2020    Procedure: Exam under anesthsia, vaginal biopsies, possible CO2 laser of the vagina;  Surgeon: Lilliam Roy MD;  [facility]     GI SURGERY  2004    gastric bypass     LASER CO2 VAGINA N/A 3/2/2015    Procedure: LASER CO2 VAGINA;  Surgeon: Mariela Abdalla MD;  Location: [facility]     LASER CO2 VAGINA N/A 5/24/2019    Procedure: Exam under anesthesia, vaginal biopsies, CO2 laser of the vagina;  Surgeon: Lilliam Roy MD;  Location: [facility]     LASER CO2 VAGINA N/A 6/8/2021    Procedure: Exam under anesthesia, laser ablation of the upper vagina;  Surgeon: Dany Perez MD;  Location: [facility]        Physical Exam:  BP (!) 190/87 (BP Location: Right arm, Patient Position: Chair, Cuff Size: Adult Large)   Pulse 86   Ht 1.6 m (5' 3\")   Wt 98.9 kg (218 lb)   SpO2 100%   BMI 38.62 kg/m     General appearance: no acute distress, well groomed, sitting comfortably   :  External: vulva/labia overall normal in appearance  Vagina: significant pallor of the mucosa extending out to the keratinized epithelium, there is some telangiectasias surrounding the urethra, within the vagina there is pallor of the skin and there is scarring with agglutiation of the upper vagina.  No lesions  Cervix: Unable to visualize due to agglutination of the upper vagina  Uterus/adnexa: not palpable due to stenosis " of the vagina, there is no nodularity noted    Labs/Pathology:  Last Pap smear was ASCUS H/other high risk HPV positive.  Colposcopy was performed at that time which did not show any obvious lesions.  Biopsy has not been done.    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    Plan:  -History of cervical cancer/VAIN III: No lesions on exam today.  Although her most recent Pap smear did raise question of high-grade lesion, been unable to identify a source for this on colposcopy and thus no biopsies have yet been taken.  Overall her tissues look substantially better than they have previously.  We will continue with every 6-month follow-up with a Pap smear at her fall 2023 visit.    Vaginal necrosis: No evidence of necrosis on exam today.  Continue vaginal estrogen as prescribed.    Lichen simplex chronicus: Continue topical triamcinolone ointment as needed up to 2 times weekly.    -Bladder pain: Continue follow-up with urology.      Dany Perez MD     Gynecologic Oncology

## 2023-02-21 NOTE — NURSING NOTE
"Oncology Rooming Note    February 21, 2023 2:29 PM   Alis Hartman is a 67 year old female who presents for:    Chief Complaint   Patient presents with     Oncology Clinic Visit     Colpo     Initial Vitals: BP (!) 190/87 (BP Location: Right arm, Patient Position: Chair, Cuff Size: Adult Large)   Pulse 86   Ht 1.6 m (5' 3\")   Wt 98.9 kg (218 lb)   SpO2 100%   BMI 38.62 kg/m   Estimated body mass index is 38.62 kg/m  as calculated from the following:    Height as of this encounter: 1.6 m (5' 3\").    Weight as of this encounter: 98.9 kg (218 lb). Body surface area is 2.1 meters squared.  No Pain (0) Comment: Data Unavailable   No LMP recorded. Patient is postmenopausal.  Allergies reviewed: Yes  Medications reviewed: Yes    Medications: Medication refills not needed today.  Pharmacy name entered into Nightpro:    [id number] [address]  [id number] [address] [facility] [address] [id number] [address] [id number] [place], [place] - [address] AT [facility] [address]    Clinical concerns: NO Dr. Perez was notified.      Jewels Stone CMA              "

## 2023-02-21 NOTE — Clinical Note
2/21/2023         RE: Alis Hartman  [address]  [place] [address]        Dear Colleague,    Thank you for referring your patient, Alis Hartman, to the [facility]. Please see a copy of my visit note below.    Gynecologic Oncology Clinic - Established Patient Visit    Visit date: Feb 21, 2023     Reason for visit: history of VAIN III    Interval history: Alis Hartman is a 67 year old who has a history of cervix cancer treated with primary radiation who also has a history of VAIN III treated with laser. Her last laser ablation was in 6/2021. She also has radiation induced necrosis.    Since her last visit Hannah has seen urology.  She is following the recommendations made by their team.  She does continue to use Pyridium.  She reports that expressed no concerns about this at her visit.  She has no episodes of vaginal bleeding.  She continues to have her baseline dysuria.  No vaginal discharge.  She is using topical steroid ointment as needed.  She is using this at most 1-2 times weekly.  She does also report use of vaginal estrogen.  She feels that this is made a significant difference.        Relevant history:  Oncology History   Cervical cancer (H)   3/1996 Initial Diagnosis    Cervical cancer    Moderately differentiated squamous cell carcinoma. She was treated with primary radiation therapy, unsure if she also had chemotherapy or not.  This treatment was at [facility].     12/22/2014 Procedure    Exam under anesthesia with LASER to the vagina for VAIN 3     5/24/2019 Procedure    12/27/18:  Pap:  HSIL, HPV+  5/24/19: PROCEDURES: Vaginal colposcopy, vaginal biopsy, CO2 laser of the vagina  VAGINAL BIOPSY:   - High grade squamous intraepithelial lesion (vaginal intraepithelial neoplasia 3)      3/12/2020 Procedure    10/14/19: Pap HSIL/other HPV+  2/28/2020: biopsy of vagina Vain III; MRI recommended prior to OR; however, patient did not complete  "this  3/12/2020: EUA, biopsies, LASER: VAIN III     6/8/2021 Procedure    6/8/2021: Exam under anesthesia, vaginal biopsies, laser ablation of the upper vagina with Dr. Perez, biopsies returned showing necrosis, no dysplasia. Hyperbaric oxygen therapy discussed and referral place. Patient was unable to pursue this due to need for transportation to the [facility].          Review of Systems:  As per HPI, otherwise non-contributory.     Past Surgical History:  Past Surgical History:   Procedure Laterality Date     ABDOMINOPLASTY       BIOPSY CERVICAL, LOCAL EXCISION, SINGLE/MULTIPLE  10/26/2011    Procedure:BIOPSY CERVICAL, LOCAL EXCISION, SINGLE/MULTIPLE; EUA, cervical biopsies; Surgeon:BETTY TINEO; [facility]     BIOPSY VAGINAL N/A 6/8/2021    Procedure: BIOPSY, VAGINA;  Surgeon: Dany Perez MD;  Location: [facility] OR     EXAM UNDER ANESTHESIA PELVIC N/A 3/12/2020    Procedure: Exam under anesthsia, vaginal biopsies, possible CO2 laser of the vagina;  Surgeon: Lilliam Roy MD;  [facility]     GI SURGERY  2004    gastric bypass     LASER CO2 VAGINA N/A 3/2/2015    Procedure: LASER CO2 VAGINA;  Surgeon: Mariela Abdalla MD;  [facility]: [facility]     LASER CO2 VAGINA N/A 5/24/2019    Procedure: Exam under anesthesia, vaginal biopsies, CO2 laser of the vagina;  Surgeon: Lilliam Roy MD;  Location: [facility]     LASER CO2 VAGINA N/A 6/8/2021    Procedure: Exam under anesthesia, laser ablation of the upper vagina;  Surgeon: Dany Perez MD;  Location: [facility] OR        Physical Exam:  BP (!) 190/87 (BP Location: Right arm, Patient Position: Chair, Cuff Size: Adult Large)   Pulse 86   Ht 1.6 m (5' 3\")   Wt 98.9 kg (218 lb)   SpO2 100%   BMI 38.62 kg/m     General appearance: no acute distress, well groomed, sitting comfortably   :  External: vulva/labia overall normal in appearance  Vagina: significant pallor of the mucosa extending out to the keratinized epithelium, there is some " telangiectasias surrounding the urethra, within the vagina there is pallor of the skin and there is scarring with agglutiation of the upper vagina.  No lesions  Cervix: Unable to visualize due to agglutination of the upper vagina  Uterus/adnexa: not palpable due to stenosis of the vagina, there is no nodularity noted    Labs/Pathology:  Last Pap smear was ASCUS H/other high risk HPV positive.  Colposcopy was performed at that time which did not show any obvious lesions.  Biopsy has not been done.    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    Plan:  -History of cervical cancer/VAIN III: No lesions on exam today.  Although her most recent Pap smear did raise question of high-grade lesion, been unable to identify a source for this on colposcopy and thus no biopsies have yet been taken.  Overall her tissues look substantially better than they have previously.  We will continue with every 6-month follow-up with a Pap smear at her fall 2023 visit.    Vaginal necrosis: No evidence of necrosis on exam today.  Continue vaginal estrogen as prescribed.    Lichen simplex chronicus: Continue topical triamcinolone ointment as needed up to 2 times weekly.    -Bladder pain: Continue follow-up with urology.      Dany Perez MD     Gynecologic Oncology       Again, thank you for allowing me to participate in the care of your patient.        Sincerely,        Dany Perez MD

## 2023-04-06 ENCOUNTER — TELEPHONE (OUTPATIENT)
Dept: UROLOGY | Facility: CLINIC | Age: 68
End: 2023-04-06

## 2023-04-06 ENCOUNTER — ANCILLARY PROCEDURE (OUTPATIENT)
Dept: CT IMAGING | Facility: CLINIC | Age: 68
End: 2023-04-06
Attending: UROLOGY
Payer: COMMERCIAL

## 2023-04-06 DIAGNOSIS — Z85.41 HISTORY OF CERVICAL CANCER: ICD-10-CM

## 2023-04-06 LAB — RADIOLOGIST FLAGS: NORMAL

## 2023-04-06 PROCEDURE — 74178 CT ABD&PLV WO CNTR FLWD CNTR: CPT | Performed by: RADIOLOGY

## 2023-04-06 RX ORDER — IOPAMIDOL 755 MG/ML
77 INJECTION, SOLUTION INTRAVASCULAR ONCE
Status: DISCONTINUED | OUTPATIENT
Start: 2023-04-06 | End: 2023-04-06 | Stop reason: DRUGHIGH

## 2023-04-06 RX ORDER — IOPAMIDOL 755 MG/ML
134 INJECTION, SOLUTION INTRAVASCULAR ONCE
Status: COMPLETED | OUTPATIENT
Start: 2023-04-06 | End: 2023-04-06

## 2023-04-06 RX ADMIN — IOPAMIDOL 134 ML: 755 INJECTION, SOLUTION INTRAVASCULAR at 08:57

## 2023-04-06 NOTE — TELEPHONE ENCOUNTER
[facility] Call Center    Phone Message    May a detailed message be left on voicemail: yes     Reason for Call: Other: . Yoli with [facility] is calling to report an incidental finding from pts CT urogram done 4/6/23- please call imaging [phone], thank you     Action Taken: Message routed to:  Other: uro    Travel Screening: Not Applicable

## 2023-04-19 ENCOUNTER — VIRTUAL VISIT (OUTPATIENT)
Dept: UROLOGY | Facility: CLINIC | Age: 68
End: 2023-04-19
Payer: COMMERCIAL

## 2023-04-19 DIAGNOSIS — N32.81 OAB (OVERACTIVE BLADDER): Primary | ICD-10-CM

## 2023-04-19 PROCEDURE — 99213 OFFICE O/P EST LOW 20 MIN: CPT | Mod: 95 | Performed by: UROLOGY

## 2023-04-19 RX ORDER — TOLTERODINE 4 MG/1
4 CAPSULE, EXTENDED RELEASE ORAL DAILY
Qty: 90 CAPSULE | Refills: 1 | Status: SHIPPED | OUTPATIENT
Start: 2023-04-19 | End: 2024-04-02

## 2023-04-19 NOTE — PROGRESS NOTES
"F/u urge/freq/mixed UI 15 yrs after pelvic XRT for SCCA cervix, cysto capacity 150 ml, large fluids  (phone visit)    Intermittently compliant with the Detrol.    Control is now quite good. Wears pad day and nite but only drops, particularly \"when I'm busy and I wait too long.\"    Denies dysuria, gross hematuria, daytime frequency; noc x 3.     No drug side effects.      4/36/23: CT urogram:          IMP:  1. Urge/freq, complex, improving on Detrol  2. Large uterus, hx cervical ca      PLAN:  1. Discussed situation with patient in detail.  2. Continue Detrol; discussed importance of compliance for best result  3. Pt advised to contact Gyn today to set up apt for eval of uterus; she expresses understanding  4. Follow up 6 mos with phone visit or sooner prn  5. Total time spent in reviewing patient's previous records, discussion with patient and documentation: 20 minutes          "

## 2023-04-24 NOTE — TELEPHONE ENCOUNTER
writer called and was on hold for 22 minutes before [facility] picked up and was transferred to radiology read room.  Incidental finding noted in chart to be pelvic mass.  Writer called and spoke with Dr. Merritt regarding this.  Chart routed to him to advise.    Gretchen JOHNSTON RN Urology 4/6/2023 3:34 PM         Left arm;

## 2023-05-05 ENCOUNTER — TELEPHONE (OUTPATIENT)
Dept: UROLOGY | Facility: CLINIC | Age: 68
End: 2023-05-05
Payer: COMMERCIAL

## 2023-05-05 NOTE — TELEPHONE ENCOUNTER
"Called pt and spoke to her, she stated \"what is this medication really doing for me\". Her s/s have not changed she stated. RN went over irritants with her, scheduled toileting, and bladder capacity that's recorded. Advised she try the medication for another couple weeks as he has only started takign it consistently recently. She will do this.    KLAUDIA Gonzales RN 5/5/2023 12:59 PM    "

## 2023-05-05 NOTE — TELEPHONE ENCOUNTER
[facility] Call Center    Phone Message    May a detailed message be left on voicemail: yes     Reason for Call: Patient has questions regarding her medication tolterodine ER 4MG. Would like to speak with care team. Please reach out. Thank you    Action Taken: Message routed to:  [facility] URO    Travel Screening: Not Applicable

## 2023-05-18 ENCOUNTER — TELEPHONE (OUTPATIENT)
Dept: ONCOLOGY | Facility: CLINIC | Age: 68
End: 2023-05-18
Payer: COMMERCIAL

## 2023-05-18 NOTE — CONFIDENTIAL NOTE
"Pt calling in to report symptoms of pain in her vaginal area. She notes that this has been occurring for the past month, and is present all the time and it becomes worse upon urination. She described the pain as a \"burning sensation\". She denies having vaginal discharge, bleeding, discolored urine, fevers, shortness of breath, swelling, or malodorous urine.    Pt had visit with Dr. Perez on 2/21 and reported using kenalog cream with nystatin, as well as estradiol cream which was effective for treating her baseline dysuria at the time. She now reports \"these medications are not working anymore\". She is wondering if there is something else that can be done, or if she should schedule and appointment for examination.    Will route to provider for recommendations.  "

## 2023-05-19 NOTE — PROGRESS NOTES
Follow Up Notes on Referred Patient    Date: 2023      RE: Alis Hartman  : 1955  SHERMAN: 2023          Alis Hartman is a 67 year old woman with a history of cervix cancer treated with primary radiation who also has a history of VAIN III treated with laser. Her last laser ablation was in 2021. She also has radiation induced necrosis. She is here today for an acute visit.     Oncology History   Cervical cancer (H)   3/1996 Initial Diagnosis    Cervical cancer    Moderately differentiated squamous cell carcinoma. She was treated with primary radiation therapy, unsure if she also had chemotherapy or not.  This [place] was [place] [facility] [place]     2014 Procedure    Exam under anesthesia with LASER to the vagina for VAIN 3     2019 Procedure    18:  Pap:  HSIL, HPV+  19: PROCEDURES: Vaginal colposcopy, vaginal biopsy, CO2 laser of the vagina  VAGINAL BIOPSY:   - High grade squamous intraepithelial lesion (vaginal intraepithelial neoplasia 3)      3/12/2020 Procedure    10/14/19: Pap HSIL/other HPV+  2020: biopsy of vagina Vain III; MRI recommended prior to OR; however, patient did not complete this  3/12/2020: EUA, biopsies, LASER: VAIN III     2021 Procedure    2021: Exam under anesthesia, vaginal biopsies, laser ablation of the upper vagina with Dr. Perez, biopsies returned showing necrosis, no dysplasia. Hyperbaric oxygen therapy discussed and referral place. Patient was unable to pursue this due to need for transportation to the [facility].     2023 Imaging    2023 CT Urogram: IMPRESSION:  1.  Negative CT urogram. No urinary calculi, solid renal mass, or evidence of upper tract urothelial malignancy.   2.  Enlarged uterus, with marked distention of the endometrial canal by intermediate density material, possibly a mix of fluid and blood products. This may be related to cervical stenosis given the history of prior pelvic radiation.  Uterine enlargement may contribute to urinary frequency/urgency.   3.  Indeterminate left pelvic/perirectal mass with rim calcifications measuring up to 4.5 cm. Differentials include an old hematoma or an atypical enlarged lymph node. Consider pelvic MRI with contrast and subtraction imaging for further evaluation. The uterus and endometrium can also be further evaluated at that time.                    Today Aranza comes to the clinic for vaginal discomfort. Pain is in the vagina not vulva. Pain is dry irritated sensation. No odor or discharge. No vulvar pain. No new sexual partners. No vaginal bleeding.     Using Lume body wash at vulva (not in vagina). Started this about a month ago and noticed pain worsened.   Continues vaginal estrogen and triamcinolone cream as prescribed.     Still following with Urology. Continues Detrol for OAB however started this only a month ago. Continues to have urinary frequency. Continues to get up 7-8 times per night. No hematuria. She continues to have chronic dysuria.     She denies any vaginal bleeding, no changes in her bowel, no nausea/emesis, no lower extremity edema, and no difficulties eating or sleeping. She denies any abdominal discomfort/bloating, no fevers or chills, and no chest pain or shortness of breath.    Norvasc and hydrochlorothiazide has not taken medications this morning. Does not have machine at home but working with PCP to get this.     Weight loss intentional per pt. Has started Victoza last month with PCP for weight loss. Eating well per pt but has noticed appetite decrease with the medication.                     Review of Systems:    Systemic           no weight changes; no fever; no chills; no night sweats; no appetite changes  Skin           no rashes, or lesions  Eye           no irritation; no changes in vision  Jamir-Laryngeal           no dysphagia; no hoarseness   Pulmonary    no cough; no shortness of breath  Cardiovascular    no chest pain; no  palpitations, + HTN  Gastrointestinal    no diarrhea; no constipation; no abdominal pain; no changes in bowel  habits; no blood in stool  Genitourinary   + urinary frequency; no urinary urgency;+ dysuria; no pain; no abnormal vaginal discharge; no abnormal vaginal bleeding  Breast    no breast discharge; no breast changes; no breast pain  Musculoskeletal    no myalgias; no arthralgias; no back pain  Psychiatric           no depressed mood; no anxiety    Hematologic           no tender lymph nodes; no noticeable swellings or lumps   Endocrine    no hot flashes; no heat/cold intolerance         Neurological   no tremor; no numbness and tingling; no headaches; no difficulty  sleeping      Past Medical History:    Past Medical History:   Diagnosis Date     Depression      Hypertension      Malignant neoplasm of endocervix (H)     Tx with radiation     Other chronic pain     Low back     Urinary incontinence          Past Surgical History:    Past Surgical History:   Procedure Laterality Date     ABDOMINOPLASTY       BIOPSY CERVICAL, LOCAL EXCISION, SINGLE/MULTIPLE  10/26/2011    Procedure:BIOPSY CERVICAL, LOCAL EXCISION, SINGLE/MULTIPLE; EUA, cervical biopsies; Surgeon:BETTY TINEO; Location:[facility] OR     BIOPSY VAGINAL N/A 6/8/2021    Procedure: BIOPSY, VAGINA;  Surgeon: Dany Perez MD;  Location: [facility] OR     EXAM UNDER ANESTHESIA PELVIC N/A 3/12/2020    Procedure: Exam under anesthsia, vaginal biopsies, possible CO2 laser of the vagina;  Surgeon: Lilliam Roy MD;  Location: [facility] OR     GI SURGERY  2004    gastric bypass     LASER CO2 VAGINA N/A 3/2/2015    Procedure: LASER CO2 VAGINA;  Surgeon: Mariela Abdalla MD;  Location: [facility] OR     LASER CO2 VAGINA N/A 5/24/2019    Procedure: Exam under anesthesia, vaginal biopsies, CO2 laser of the vagina;  Surgeon: Lilliam Roy MD;  Location: [facility] OR     LASER CO2 VAGINA N/A 6/8/2021    Procedure: Exam under anesthesia, laser ablation of the upper  vagina;  Surgeon: Dany Perez MD;  Location:  OR         Health Maintenance Due   Topic Date Due     DEXA  Never done     ADVANCE CARE PLANNING  Never done     COLORECTAL CANCER SCREENING  Never done     HEPATITIS C SCREENING  Never done     LIPID  Never done     MEDICARE ANNUAL WELLNESS VISIT  Never done     Pneumococcal Vaccine: 65+ Years (3 - PPSV23 if available, else PCV20) 03/29/2020     PHQ-2 (once per calendar year)  01/01/2023     COVID-19 Vaccine (6 - Pfizer series) 01/22/2023     MAMMO SCREENING  04/21/2023       Current Medications:     Current Outpatient Medications   Medication Sig Dispense Refill     acetaminophen (TYLENOL) 500 MG tablet Take 1,000 mg by mouth       albuterol (PROAIR HFA/PROVENTIL HFA/VENTOLIN HFA) 108 (90 Base) MCG/ACT inhaler Inhale 1-2 puffs into the lungs as needed       amLODIPine (NORVASC) 5 MG tablet Take 10 mg by mouth daily       calcium Citrate-vitamin D 500-400 MG-UNIT CHEW Take 1 tablet by mouth       childrens multivitamin w/iron (FLINTSTONES COMPLETE) 60 MG chewable tablet Take 2 tablets by mouth       cholecalciferol 125 MCG (5000 UT) CAPS Take 5000 mg 4 times a week       docusate sodium (COLACE) 100 MG capsule Take 100 mg by mouth       estradiol (ESTRACE) 0.1 MG/GM vaginal cream Insert 1-2g into the vagina at bedtime 1-2 nights a week thereafter. 42.5 g 3     hydrochlorothiazide (HYDRODIURIL) 25 MG tablet Take 25 mg by mouth       HYDROcodone-acetaminophen (NORCO) 5-325 MG tablet Take 1-2 tablets by mouth every 6 hours as needed for moderate to severe pain 6 tablet 0     lidocaine (XYLOCAINE) 5 % external ointment Apply 1 Application topically as needed       lidocaine-prilocaine (EMLA) 2.5-2.5 % external cream        LORazepam (ATIVAN) 1 MG tablet Take 1 tablet (1 mg) by mouth every 6 hours as needed for anxiety 2 tablet 0     Multiple Vitamin (MULTI-VITAMIN) per tablet Take 1 tablet by mouth daily.       NARCAN 4 MG/0.1ML nasal spray TK UTD PER PACKAGE INSERT   0     oxyCODONE IR (ROXICODONE) 10 MG tablet Take 1 tablet (10 mg) by mouth 3 times daily as needed Do not take while taking Vicodin.       phenazopyridine (PYRIDIUM) 100 MG tablet Take 1 tablet (100 mg) by mouth 3 times daily as needed for urinary tract discomfort 90 tablet 3     sertraline (ZOLOFT) 25 MG tablet Take 1 tablet by mouth daily.       tolterodine ER (DETROL LA) 4 MG 24 hr capsule Take 1 capsule (4 mg) by mouth daily 90 capsule 1     tolterodine ER (DETROL LA) 4 MG 24 hr capsule Take 1 capsule (4 mg) by mouth daily 90 capsule 0     triamcinolone (KENALOG) 0.1 % external ointment Apply a pea sized amount to the vulva and vaginal opening as needed for irritation. May mix with nystatin, but do not use more than 1x daily 30 g 3     zolpidem (AMBIEN) 10 MG tablet Take 10 mg by mouth           Allergies:        Allergies   Allergen Reactions     Ibuprofen Nausea and Vomiting     Shrimp      Sulfa Antibiotics Rash        Social History:     Social History     Tobacco Use     Smoking status: Never     Smokeless tobacco: Never   Vaping Use     Vaping status: Not on file   Substance Use Topics     Alcohol use: Yes     Comment: Rare       History   Drug Use No         Family History:     The patient's family history is notable for     Family History   Problem Relation Age of Onset     Heart Disease Father      Hypertension Sister          Physical Exam:     BP (!) 148/87 (BP Location: Left arm, Patient Position: Sitting, Cuff Size: Adult Large)   Pulse 75   Temp 98  F (36.7  C) (Oral)   Resp 18   Wt 93.3 kg (205 lb 9.6 oz)   SpO2 99%   BMI 36.42 kg/m    Body mass index is 36.42 kg/m .    General Appearance: healthy and alert, no distress     HEENT: no thyromegaly, no palpable nodules or masses        Cardiovascular: regular rate and rhythm, no gallops, rubs or murmurs     Respiratory: lungs clear, no rales, rhonchi or wheezes    Musculoskeletal: extremities non tender    Skin: no lesions or rashes      Neurological: normal gait, no gross defects     Psychiatric: appropriate mood and affect                               Hematological: normal cervical, supraclavicular and inguinal lymph nodes     Gastrointestinal:       abdomen soft, non-tender, non-distended, no organomegaly or masses    Genitourinary: External genitalia and urethral meatus appears normal.  Vagina is smooth with pallor throughout vaginal mucosa , no lesions. Vagina with foreshortening from prior radiation. Unable to visrualize radiation due to agglutination at upper vagina. Vagina is without nodularity or masses. Vagina soft including pelvic side walls. Bimanual exam reveal no masses, nodularity or fullness.  Recto-vaginal exam omitted per pt request. Wet prep collected.         Assessment:    Alis Hartman is a 67 year old woman with a history of cervix cancer treated with primary radiation who also has a history of VAIN III treated with laser. Her last laser ablation was in 6/2021. She also has radiation induced necrosis. She is here today for an acute visit.     50 minutes spent on the date of the encounter doing chart review, history and exam, documentation, and further activities as noted above.        Plan:     1.)        Vaginal discomfort: JESSICA on exam today and no evidence of necrosis. Vaginal necrosis: Continue vaginal estrogen as prescribed. Encouraged pt to stop new Lume body wash. Reviewed no soap in vagina. Can try unscented Dove at vulva. Reviewed ok to try vaginal Replens OTC. Lichen simplex chronicus: Continue topical triamcinolone ointment as needed up to 2 times weekly. Pap due Fall 2023.     2.)        Pelvic mass: MRI pelvis needed and ordered today. Request sent to [facility] to schedule this and get pt in with [facility] within 1-2 weeks. Reviewed with pt today CT findings from 4/2023 with evidence of mass and need of MRI. Reviewed that MD will discuss next steps after MRI results.     3.)        Bladder pain: Continue  follow-up with urology.    4.) Labs and/or tests ordered include:  UA, Wet prep; pelvic mri    Wet prep: reviewed and negative for BV, trich, yeast.   UA: reviewed with evidence of UTI. Culture pending. Will rx appropriate abx once culture results if needed.     5.)        HTN: encouraged follow up with PCP for home machine and for BP >/140/90.            ERINN Hamilton, NP-BC  Women's Health Nurse Practitioner  Division of Gynecologic Oncology  [facility]        CC  Patient Care Team:  Maria Fernanda Eckert as PCP - General (Internal Medicine)  Dany Perez MD as Assigned Cancer Care Provider  Enoch Merritt MD as MD (Urology)  Dany Perez MD as MD (OB/Gyn)  Enoch Merritt MD as Assigned Surgical Provider

## 2023-05-19 NOTE — CONFIDENTIAL NOTE
Writer called pt with recommendations from Ella OSUNA and Dr. Perez.    Dany Perez MD  You; Ella Marino APRN CNP; Nicholas Jarrell, RN 11 minutes ago (8:51 AM)     AO  She also follows with Urology, so if it is just dysuria concerns she may also need to see them. She can see whomever in our clinic. My schedule is a nightmare since i've been out so she may have to see an [facility]. But again, if it's dysuria, she may also want to see Urology or see them instead.     Ella Ly APRN CNP  You; Nicholas Jarrell, RN; Dany Perez MD 15 hours ago (5:49 PM)     KK  You can offer the patient an appt with any NP at any location. Thanks      Pt stated that the pain is occurring most of the day, and not just when she is urinating. She would like to be evaluated by oncology clinic. Pt scheduled to see Ella OSUNA at [place] on 5/23 at 0800. Pt verbalized understanding.

## 2023-05-23 ENCOUNTER — ONCOLOGY VISIT (OUTPATIENT)
Dept: ONCOLOGY | Facility: CLINIC | Age: 68
End: 2023-05-23
Attending: OBSTETRICS & GYNECOLOGY
Payer: COMMERCIAL

## 2023-05-23 VITALS
RESPIRATION RATE: 18 BRPM | TEMPERATURE: 98 F | BODY MASS INDEX: 36.42 KG/M2 | WEIGHT: 205.6 LBS | SYSTOLIC BLOOD PRESSURE: 148 MMHG | OXYGEN SATURATION: 99 % | DIASTOLIC BLOOD PRESSURE: 87 MMHG | HEART RATE: 75 BPM

## 2023-05-23 DIAGNOSIS — N95.2 ATROPHIC VAGINITIS: ICD-10-CM

## 2023-05-23 DIAGNOSIS — R30.0 DYSURIA: ICD-10-CM

## 2023-05-23 DIAGNOSIS — R19.00 PELVIC MASS: ICD-10-CM

## 2023-05-23 DIAGNOSIS — C53.9 MALIGNANT NEOPLASM OF CERVIX, UNSPECIFIED SITE (H): ICD-10-CM

## 2023-05-23 DIAGNOSIS — D07.2 VAIN III (VAGINAL INTRAEPITHELIAL NEOPLASIA III): Primary | ICD-10-CM

## 2023-05-23 DIAGNOSIS — N90.89 VULVAR IRRITATION: ICD-10-CM

## 2023-05-23 DIAGNOSIS — N30.01 ACUTE CYSTITIS WITH HEMATURIA: ICD-10-CM

## 2023-05-23 LAB
ALBUMIN UR-MCNC: ABNORMAL MG/DL
APPEARANCE UR: ABNORMAL
BACTERIA #/AREA URNS HPF: ABNORMAL /HPF
BILIRUB UR QL STRIP: NEGATIVE
CLUE CELLS: NORMAL
COLOR UR AUTO: YELLOW
GLUCOSE UR STRIP-MCNC: NEGATIVE MG/DL
HGB UR QL STRIP: ABNORMAL
KETONES UR STRIP-MCNC: NEGATIVE MG/DL
LEUKOCYTE ESTERASE UR QL STRIP: ABNORMAL
MUCOUS THREADS #/AREA URNS LPF: PRESENT /LPF
NITRATE UR QL: POSITIVE
PH UR STRIP: 6 [PH] (ref 5–7)
RBC URINE: 12 /HPF
SP GR UR STRIP: 1.01 (ref 1–1.03)
SQUAMOUS EPITHELIAL: 2 /HPF
TRICHOMONAS, WET PREP: NORMAL
UROBILINOGEN UR STRIP-MCNC: NORMAL MG/DL
WBC CLUMPS #/AREA URNS HPF: PRESENT /HPF
WBC URINE: >182 /HPF
WBC'S/HIGH POWER FIELD, WET PREP: NORMAL
YEAST, WET PREP: NORMAL

## 2023-05-23 PROCEDURE — 87186 SC STD MICRODIL/AGAR DIL: CPT | Performed by: OBSTETRICS & GYNECOLOGY

## 2023-05-23 PROCEDURE — 87210 SMEAR WET MOUNT SALINE/INK: CPT | Performed by: OBSTETRICS & GYNECOLOGY

## 2023-05-23 PROCEDURE — G0463 HOSPITAL OUTPT CLINIC VISIT: HCPCS | Performed by: OBSTETRICS & GYNECOLOGY

## 2023-05-23 PROCEDURE — 81001 URINALYSIS AUTO W/SCOPE: CPT | Performed by: OBSTETRICS & GYNECOLOGY

## 2023-05-23 PROCEDURE — 99215 OFFICE O/P EST HI 40 MIN: CPT | Performed by: OBSTETRICS & GYNECOLOGY

## 2023-05-23 ASSESSMENT — PAIN SCALES - GENERAL: PAINLEVEL: EXTREME PAIN (9)

## 2023-05-23 NOTE — LETTER
2023         RE: Alis Hartman  [address]  [place] [address]        Dear Colleague,    Thank you for referring your patient, Alis Hartman, to the [facility]. Please see a copy of my visit note below.                    Follow Up Notes on Referred Patient    Date: 2023      RE: Alis Hartman  : 1955  SHERMAN: 2023          Alis Hartman is a 67 year old woman with a history of cervix cancer treated with primary radiation who also has a history of VAIN III treated with laser. Her last laser ablation was in 2021. She also has radiation induced necrosis. She is here today for an acute visit.     Oncology History   Cervical cancer (H)   3/1996 Initial Diagnosis    Cervical cancer    Moderately differentiated squamous cell carcinoma. She was treated with primary radiation therapy, unsure if she also had chemotherapy or not.  This treatment was at [facility].     2014 Procedure    Exam under anesthesia with LASER to the vagina for VAIN 3     2019 Procedure    18:  Pap:  HSIL, HPV+  19: PROCEDURES: Vaginal colposcopy, vaginal biopsy, CO2 laser of the vagina  VAGINAL BIOPSY:   - High grade squamous intraepithelial lesion (vaginal intraepithelial neoplasia 3)      3/12/2020 Procedure    10/14/19: Pap HSIL/other HPV+  2020: biopsy of vagina Vain III; MRI recommended prior to OR; however, patient did not complete this  3/12/2020: EUA, biopsies, LASER: VAIN III     2021 Procedure    2021: Exam under anesthesia, vaginal biopsies, laser ablation of the upper vagina with Dr. Perez, biopsies returned showing necrosis, no dysplasia. Hyperbaric oxygen therapy discussed and referral place. Patient was unable to pursue this due to need for transportation to the [facility].     2023 Imaging    2023 CT Urogram: IMPRESSION:  1.  Negative CT urogram. No urinary calculi, solid renal mass, or evidence of upper  tract urothelial malignancy.   2.  Enlarged uterus, with marked distention of the endometrial canal by intermediate density material, possibly a mix of fluid and blood products. This may be related to cervical stenosis given the history of prior pelvic radiation. Uterine enlargement may contribute to urinary frequency/urgency.   3.  Indeterminate left pelvic/perirectal mass with rim calcifications measuring up to 4.5 cm. Differentials include an old hematoma or an atypical enlarged lymph node. Consider pelvic MRI with contrast and subtraction imaging for further evaluation. The uterus and endometrium can also be further evaluated at that time.                    Today Aranza comes to the clinic for vaginal discomfort. Pain is in the vagina not vulva. Pain is dry irritated sensation. No odor or discharge. No vulvar pain. No new sexual partners. No vaginal bleeding.     Using Lume body wash at vulva (not in vagina). Started this about a month ago and noticed pain worsened.   Continues vaginal estrogen and triamcinolone cream as prescribed.     Still following with Urology. Continues Detrol for OAB however started this only a month ago. Continues to have urinary frequency. Continues to get up 7-8 times per night. No hematuria. She continues to have chronic dysuria.     She denies any vaginal bleeding, no changes in her bowel, no nausea/emesis, no lower extremity edema, and no difficulties eating or sleeping. She denies any abdominal discomfort/bloating, no fevers or chills, and no chest pain or shortness of breath.    Norvasc and hydrochlorothiazide has not taken medications this morning. Does not have machine at home but working with PCP to get this.     Weight loss intentional per pt. Has started Victoza last month with PCP for weight loss. Eating well per pt but has noticed appetite decrease with the medication.                     Review of Systems:    Systemic           no weight changes; no fever; no chills; no  night sweats; no appetite changes  Skin           no rashes, or lesions  Eye           no irritation; no changes in vision  Jamir-Laryngeal           no dysphagia; no hoarseness   Pulmonary    no cough; no shortness of breath  Cardiovascular    no chest pain; no palpitations, + HTN  Gastrointestinal    no diarrhea; no constipation; no abdominal pain; no changes in bowel  habits; no blood in stool  Genitourinary   + urinary frequency; no urinary urgency;+ dysuria; no pain; no abnormal vaginal discharge; no abnormal vaginal bleeding  Breast    no breast discharge; no breast changes; no breast pain  Musculoskeletal    no myalgias; no arthralgias; no back pain  Psychiatric           no depressed mood; no anxiety    Hematologic           no tender lymph nodes; no noticeable swellings or lumps   Endocrine    no hot flashes; no heat/cold intolerance         Neurological   no tremor; no numbness and tingling; no headaches; no difficulty  sleeping      Past Medical History:    Past Medical History:   Diagnosis Date     Depression      Hypertension      Malignant neoplasm of endocervix (H)     Tx with radiation     Other chronic pain     Low back     Urinary incontinence          Past Surgical History:    Past Surgical History:   Procedure Laterality Date     ABDOMINOPLASTY       BIOPSY CERVICAL, LOCAL EXCISION, SINGLE/MULTIPLE  10/26/2011    Procedure:BIOPSY CERVICAL, LOCAL EXCISION, SINGLE/MULTIPLE; EUA, cervical biopsies; Surgeon:BETTY TINEO; Location:[facility] OR     BIOPSY VAGINAL N/A 6/8/2021    Procedure: BIOPSY, VAGINA;  Surgeon: Dany Perez MD;  Location: [facility] OR     EXAM UNDER ANESTHESIA PELVIC N/A 3/12/2020    Procedure: Exam under anesthsia, vaginal biopsies, possible CO2 laser of the vagina;  Surgeon: Lilliam Roy MD;  [facility]     GI SURGERY  2004    gastric bypass     LASER CO2 VAGINA N/A 3/2/2015    Procedure: LASER CO2 VAGINA;  Surgeon: Mariela Abdalla MD;  Location: [facility] OR     LASER  CO2 VAGINA N/A 5/24/2019    Procedure: Exam under anesthesia, vaginal biopsies, CO2 laser of the vagina;  Surgeon: Lilliam Roy MD;  Location: [facility] OR     LASER CO2 VAGINA N/A 6/8/2021    Procedure: Exam under anesthesia, laser ablation of the upper vagina;  Surgeon: Dany Perez MD;  Location: [facility] OR         Health Maintenance Due   Topic Date Due     DEXA  Never done     ADVANCE CARE PLANNING  Never done     COLORECTAL CANCER SCREENING  Never done     HEPATITIS C SCREENING  Never done     LIPID  Never done     MEDICARE ANNUAL WELLNESS VISIT  Never done     Pneumococcal Vaccine: 65+ Years (3 - PPSV23 if available, else PCV20) 03/29/2020     PHQ-2 (once per calendar year)  01/01/2023     COVID-19 Vaccine (6 - Pfizer series) 01/22/2023     MAMMO SCREENING  04/21/2023       Current Medications:     Current Outpatient Medications   Medication Sig Dispense Refill     acetaminophen (TYLENOL) 500 MG tablet Take 1,000 mg by mouth       albuterol (PROAIR HFA/PROVENTIL HFA/VENTOLIN HFA) 108 (90 Base) MCG/ACT inhaler Inhale 1-2 puffs into the lungs as needed       amLODIPine (NORVASC) 5 MG tablet Take 10 mg by mouth daily       calcium Citrate-vitamin D 500-400 MG-UNIT CHEW Take 1 tablet by mouth       childrens multivitamin w/iron (FLINTSTONES COMPLETE) 60 MG chewable tablet Take 2 tablets by mouth       cholecalciferol 125 MCG (5000 UT) CAPS Take 5000 mg 4 times a week       docusate sodium (COLACE) 100 MG capsule Take 100 mg by mouth       estradiol (ESTRACE) 0.1 MG/GM vaginal cream Insert 1-2g into the vagina at bedtime 1-2 nights a week thereafter. 42.5 g 3     hydrochlorothiazide (HYDRODIURIL) 25 MG tablet Take 25 mg by mouth       HYDROcodone-acetaminophen (NORCO) 5-325 MG tablet Take 1-2 tablets by mouth every 6 hours as needed for moderate to severe pain 6 tablet 0     lidocaine (XYLOCAINE) 5 % external ointment Apply 1 Application topically as needed       lidocaine-prilocaine (EMLA) 2.5-2.5 % external  cream        LORazepam (ATIVAN) 1 MG tablet Take 1 tablet (1 mg) by mouth every 6 hours as needed for anxiety 2 tablet 0     Multiple Vitamin (MULTI-VITAMIN) per tablet Take 1 tablet by mouth daily.       NARCAN 4 MG/0.1ML nasal spray TK UTD PER PACKAGE INSERT  0     oxyCODONE IR (ROXICODONE) 10 MG tablet Take 1 tablet (10 mg) by mouth 3 times daily as needed Do not take while taking Vicodin.       phenazopyridine (PYRIDIUM) 100 MG tablet Take 1 tablet (100 mg) by mouth 3 times daily as needed for urinary tract discomfort 90 tablet 3     sertraline (ZOLOFT) 25 MG tablet Take 1 tablet by mouth daily.       tolterodine ER (DETROL LA) 4 MG 24 hr capsule Take 1 capsule (4 mg) by mouth daily 90 capsule 1     tolterodine ER (DETROL LA) 4 MG 24 hr capsule Take 1 capsule (4 mg) by mouth daily 90 capsule 0     triamcinolone (KENALOG) 0.1 % external ointment Apply a pea sized amount to the vulva and vaginal opening as needed for irritation. May mix with nystatin, but do not use more than 1x daily 30 g 3     zolpidem (AMBIEN) 10 MG tablet Take 10 mg by mouth           Allergies:        Allergies   Allergen Reactions     Ibuprofen Nausea and Vomiting     Shrimp      Sulfa Antibiotics Rash        Social History:     Social History     Tobacco Use     Smoking status: Never     Smokeless tobacco: Never   Vaping Use     Vaping status: Not on file   Substance Use Topics     Alcohol use: Yes     Comment: Rare       History   Drug Use No         Family History:     The patient's family history is notable for     Family History   Problem Relation Age of Onset     Heart Disease Father      Hypertension Sister          Physical Exam:     BP (!) 148/87 (BP Location: Left arm, Patient Position: Sitting, Cuff Size: Adult Large)   Pulse 75   Temp 98  F (36.7  C) (Oral)   Resp 18   Wt 93.3 kg (205 lb 9.6 oz)   SpO2 99%   BMI 36.42 kg/m    Body mass index is 36.42 kg/m .    General Appearance: healthy and alert, no distress     HEENT: no  thyromegaly, no palpable nodules or masses        Cardiovascular: regular rate and rhythm, no gallops, rubs or murmurs     Respiratory: lungs clear, no rales, rhonchi or wheezes    Musculoskeletal: extremities non tender    Skin: no lesions or rashes     Neurological: normal gait, no gross defects     Psychiatric: appropriate mood and affect                               Hematological: normal cervical, supraclavicular and inguinal lymph nodes     Gastrointestinal:       abdomen soft, non-tender, non-distended, no organomegaly or masses    Genitourinary: External genitalia and urethral meatus appears normal.  Vagina is smooth with pallor throughout vaginal mucosa , no lesions. Vagina with foreshortening from prior radiation. Unable to visrualize radiation due to agglutination at upper vagina. Vagina is without nodularity or masses. Vagina soft including pelvic side walls. Bimanual exam reveal no masses, nodularity or fullness.  Recto-vaginal exam omitted per pt request. Wet prep collected.         Assessment:    Alis Hartman is a 67 year old woman with a history of cervix cancer treated with primary radiation who also has a history of VAIN III treated with laser. Her last laser ablation was in 6/2021. She also has radiation induced necrosis. She is here today for an acute visit.     50 minutes spent on the date of the encounter doing chart review, history and exam, documentation, and further activities as noted above.        Plan:     1.)        Vaginal discomfort: JESSICA on exam today and no evidence of necrosis. Vaginal necrosis: Continue vaginal estrogen as prescribed. Encouraged pt to stop new Lume body wash. Reviewed no soap in vagina. Can try unscented Dove at vulva. Reviewed ok to try vaginal Replens OTC. Lichen simplex chronicus: Continue topical triamcinolone ointment as needed up to 2 times weekly. Pap due Fall 2023.     2.)        Pelvic mass: MRI pelvis needed and ordered today. Request sent to Nicholas  "RN[facility] to schedule this and get pt in with Ana within 1-2 weeks. Reviewed with pt today CT findings from 4/2023 with evidence of mass and need of MRI. Reviewed that MD will discuss next steps after MRI results.     3.)        Bladder pain: Continue follow-up with urology.    4.) Labs and/or tests ordered include:  UA, Wet prep; pelvic mri    Wet prep: reviewed and negative for BV, trich, yeast.   UA: reviewed with evidence of UTI. Culture pending. Will rx appropriate abx once culture results if needed.     5.)        HTN: encouraged follow up with PCP for home machine and for BP >/140/90.            ERINN Hamilton, NP-BC  Women's Health Nurse Practitioner  Division of Gynecologic Oncology  [facility]        CC  Patient Care Team:  Maria Fernanda Eckert as PCP - General (Internal Medicine)  Dany Perez MD as Assigned Cancer Care Provider  Enoch Merritt MD as MD (Urology)  Dany Perez MD as MD (OB/Gyn)  Enoch Merritt MD as Assigned Surgical Provider      Oncology Rooming Note    May 23, 2023 8:13 AM   Alis Hartman is a 67 year old female who presents for:    Chief Complaint   Patient presents with     Oncology Clinic Visit     VAIN III (vaginal intraepithelial neoplasia III)     Initial Vitals: BP (!) 148/87 (BP Location: Left arm, Patient Position: Sitting, Cuff Size: Adult Large)   Pulse 75   Temp 98  F (36.7  C) (Oral)   Resp 18   Wt 93.3 kg (205 lb 9.6 oz)   SpO2 99%   BMI 36.42 kg/m   Estimated body mass index is 36.42 kg/m  as calculated from the following:    Height as of 2/21/23: 1.6 m (5' 3\").    Weight as of this encounter: 93.3 kg (205 lb 9.6 oz). Body surface area is 2.04 meters squared.  Extreme Pain (9) Comment: Data Unavailable   No LMP recorded. Patient is postmenopausal.  Allergies reviewed: Yes  Medications reviewed: Yes    Medications: Medication refills not needed today.  Pharmacy name entered into [employer]:    [id number] - " [place], [place] - [address]  [id number]- CLOSED - [place], [place] - [address] [id number] - [facility], [place] - [address] [id number] - [facility], [place] - [address] AT [facility] [address] [place]    Clinical concerns: no       Jane Almanzar CMA                  Again, thank you for allowing me to participate in the care of your patient.        Sincerely,        ERINN Hirsch CNP

## 2023-05-23 NOTE — PROGRESS NOTES
"Oncology Rooming Note    May 23, 2023 8:13 AM   Alis Hartman is a 67 year old female who presents for:    Chief Complaint   Patient presents with     Oncology Clinic Visit     VAIN III (vaginal intraepithelial neoplasia III)     Initial Vitals: BP (!) 148/87 (BP Location: Left arm, Patient Position: Sitting, Cuff Size: Adult Large)   Pulse 75   Temp 98  F (36.7  C) (Oral)   Resp 18   Wt 93.3 kg (205 lb 9.6 oz)   SpO2 99%   BMI 36.42 kg/m   Estimated body mass index is 36.42 kg/m  as calculated from the following:    Height as of 2/21/23: 1.6 m (5' 3\").    Weight as of this encounter: 93.3 kg (205 lb 9.6 oz). Body surface area is 2.04 meters squared.  Extreme Pain (9) Comment: Data Unavailable   No LMP recorded. Patient is postmenopausal.  Allergies reviewed: Yes  Medications reviewed: Yes    Medications: Medication refills not needed today.  Pharmacy name entered into [employer]:    [id number] - [place], [place] - [address]  [id number]- [facility] [address] [id number] [place], [place] - [address] [id number] [address] [facility] & [place]    Clinical concerns: no       Jane Almanzar CMA              "

## 2023-05-24 ENCOUNTER — PATIENT OUTREACH (OUTPATIENT)
Dept: ONCOLOGY | Facility: CLINIC | Age: 68
End: 2023-05-24
Payer: COMMERCIAL

## 2023-05-24 LAB — BACTERIA UR CULT: ABNORMAL

## 2023-05-24 RX ORDER — NITROFURANTOIN 25; 75 MG/1; MG/1
100 CAPSULE ORAL 2 TIMES DAILY
Qty: 14 CAPSULE | Refills: 0 | Status: SHIPPED | OUTPATIENT
Start: 2023-05-24 | End: 2023-05-31

## 2023-05-24 RX ORDER — NITROFURANTOIN 25; 75 MG/1; MG/1
100 CAPSULE ORAL 2 TIMES DAILY
Qty: 10 CAPSULE | Refills: 0 | Status: SHIPPED | OUTPATIENT
Start: 2023-05-24 | End: 2023-05-24

## 2023-05-24 NOTE — PROGRESS NOTES
[facility]: Cancer Care Note    Writer placed telephone call to patient this afternoon, to share update regarding her recent urine culture results.  Reviewed with patient that Ella Marino CNP, has sent in a prescription for an antibiotic (Macrobid 100 mg BID x 7 days) for a UTI.  Per Ella, patient's final urine culture did not grow sensitivities - however, her UA clearly shows an infection.  Patient verbalized understanding, and stated she will plan to  the prescription from her pharmacy today or tomorrow and begin taking.    Also discussed MRI pelvis plan with patient.  Advised patient that we are trying to find a location that has a larger-bore MRI machine (and potentially a shorter machine) so that she doesn't feel quite so claustrophobic during the MRI exam (patient declines taking .  Reviewed with patient that, after speaking with MRI staff, the nearest locations that offer these larger MRI machines are [place] and the [facility] [place].  Patient states she is agreeable with going to either the [place] or [facility] locations for the MRI.    Writer spoke with imaging scheduling team, who was able to secure an appointment for patient's MRI pelvis on 5/31.  However, patient states that date won't work for her unfortunately, and requests an appointment date in early June sometime.  Scheduling team was tasked to work with patient to find a new MRI appointment date that works with her - and to also add her to Dr. Perez's schedule following the MRI, to review the results (in-person visit).      Patient aware that our scheduling team will be contacting her soon to help arrange these appointments, and denies questions or concerns at this time.    Nicholas Jarrell, RN, BSN, OCN  RN Care Coordinator - Oncology  [facility]

## 2023-07-22 ENCOUNTER — HEALTH MAINTENANCE LETTER (OUTPATIENT)
Age: 68
End: 2023-07-22

## 2023-07-26 ENCOUNTER — PATIENT OUTREACH (OUTPATIENT)
Dept: CARE COORDINATION | Facility: CLINIC | Age: 68
End: 2023-07-26
Payer: COMMERCIAL

## 2023-07-26 NOTE — PROGRESS NOTES
"Social Work - Intervention  [facility]  Data/Intervention:    Patient Name: Alis Hartman Goes By: Aranza    /Age: 1955 (68 year old)     Visit Type: telephone  Referral Source: Scheduling   Reason for Referral: Missed visits    Collaborated With:    Aranza  Care team updated     Psychosocial Information/Concerns:  SW received referral to discuss barriers to getting to appointments with Aranza: \"She has cancelled  and  with Dr. Perez and no-showed yesterday . She has also cancelled or no-showed six MRI appointments (, , , , , ).\"      Intervention/Education/Resources Provided:  BANDAR called and spoke to Aranza this morning, introducing self and reason for call. Aranza states she is doing ok today, not feeling the best, but managing. BANDAR asked about missed visits. Aranza does acknowledge she has missed some, said yesterday she didn't feel very well and additionally notes that it is hard to get to [place] and her MRIs have been scheduled there. She also worries about claustrophobia. Aranza does have [facility] which includes [facility] coverage which Aranza does utilize.     Aranza denies any SW resource needs or support needed to access care. BANDAR looked at upcoming appointments and told her about scheduled MRI and Dr. Perez visit on . BANDAR emphasized importance of following through with this appts, which Aranza acknowledged. Aranza states she doesn't anticipate issues getting there. Declines any resource or support needs at this time.      Assessment/Plan:  BANDAR updated care team. SW will remain available as needed and appropriate.      Provided patient/family with contact information and availability.    Ginger Leo, WILLIAM, LICSW, OSW-C (she/her)  Clinical , Adult Oncology  Phone: [phone]    [place] (M, W, F)  [place] (Thu)    " Wound Care/head injury

## 2024-02-19 ENCOUNTER — TELEPHONE (OUTPATIENT)
Dept: OBGYN | Facility: CLINIC | Age: 69
End: 2024-02-19
Payer: COMMERCIAL

## 2024-02-19 NOTE — TELEPHONE ENCOUNTER
Writer called pt to inform her of recommendations from Do Mendiola THAO.    Do Mendiola APRN CNP Barta, Cody, RN  Cc: Nicholas Jarrell RN  Caller: Unspecified (Today, 12:47 PM)  She should reach out to urology (they are managing her OAB). Looks like she cancelled quite a few appts last fall (was last seen w/gyn onc 5/23 and was due 11/23) and is not scheduled w/Ana until May--so I would try ot get her in sooner. Is she having any vaginal spotting/bleeding?    Pt verbalized understanding and will contact urology team for follow up of symptoms. She confirmed that she is not having any vaginal spotting/bleeding. Pt warm transferred to scheduling to inquire about seeing Dr. Perez sooner than the current May apt.

## 2024-02-19 NOTE — CONFIDENTIAL NOTE
Pt calling in to report symptoms of urinary incontinence and burning. She reports that she has had both of these symptoms for the past few months but that they have become more intense over the past week. She is leaking urine with position changes and during the night. She also reports that she is having vaginal discomfort both with and without urination.    She denies malodorous urine, hematuria, or cloudy urine. She denies fever, shortness of breath, chest pain, swelling, nausea, vomiting, diarrhea, or constipation. Taking all medications as directed.    She would like to know what the best next steps for her are regarding evaluation and treatment.    Will send to provider for recommendations.

## 2024-02-28 ENCOUNTER — OFFICE VISIT (OUTPATIENT)
Dept: UROLOGY | Facility: CLINIC | Age: 69
End: 2024-02-28
Payer: COMMERCIAL

## 2024-02-28 VITALS
DIASTOLIC BLOOD PRESSURE: 102 MMHG | WEIGHT: 167.4 LBS | SYSTOLIC BLOOD PRESSURE: 177 MMHG | OXYGEN SATURATION: 99 % | HEART RATE: 98 BPM | BODY MASS INDEX: 29.65 KG/M2

## 2024-02-28 DIAGNOSIS — N32.81 OAB (OVERACTIVE BLADDER): Primary | ICD-10-CM

## 2024-02-28 PROCEDURE — 99213 OFFICE O/P EST LOW 20 MIN: CPT | Performed by: UROLOGY

## 2024-02-28 RX ORDER — TOLTERODINE 4 MG/1
4 CAPSULE, EXTENDED RELEASE ORAL DAILY
Qty: 90 CAPSULE | Refills: 0 | Status: SHIPPED | OUTPATIENT
Start: 2024-02-28 | End: 2024-04-11

## 2024-02-28 NOTE — PROGRESS NOTES
"F/u urge/freq/mixed UI 15 yrs after pelvic XRT for SCCA cervix, cysto capacity 150 ml, large fluids      Reviewed PMH in detail including depression, HTN, chronic pain with opioid dependence, gastric bypass, paranoid schizophrenia, vaginal pain and bleeding, morbid obesity, low back pain, lower extremity edema       2/15/23 Advised to moderate fluids, start Detrol    4/19/23 Intermittently compliant with Detrol, CT shows large uterus >> advised to see GYN      Now with urinary frequency, voiding at least q hour day and nite.     \"When I drink water it goes right through me.\"    Denies dysuria, gross hematuria.     Drinks 1-2 soda, half cup coffee and a lot of water; \"Oh my God, I'm always drinking water;\" about 6-7 glasses per day; \"I have dry mouth.\"    Has not been taking Detrol; \"I tried it for a couple of days and it didn't make me better.\"    Continues on hydrochlorothiazide and has LE edema.    BM's now satisfactory.     Scheduled to see Gyn mid April.        Current Outpatient Medications   Medication    acetaminophen (TYLENOL) 500 MG tablet    albuterol (PROAIR HFA/PROVENTIL HFA/VENTOLIN HFA) 108 (90 Base) MCG/ACT inhaler    amLODIPine (NORVASC) 5 MG tablet    calcium Citrate-vitamin D 500-400 MG-UNIT CHEW    childrens multivitamin w/iron (FLINTSTONES COMPLETE) 60 MG chewable tablet    cholecalciferol 125 MCG (5000 UT) CAPS    docusate sodium (COLACE) 100 MG capsule    hydrochlorothiazide (HYDRODIURIL) 25 MG tablet    HYDROcodone-acetaminophen (NORCO) 5-325 MG tablet    lidocaine (XYLOCAINE) 5 % external ointment    lidocaine-prilocaine (EMLA) 2.5-2.5 % external cream    LORazepam (ATIVAN) 1 MG tablet    Multiple Vitamin (MULTI-VITAMIN) per tablet    NARCAN 4 MG/0.1ML nasal spray    oxyCODONE IR (ROXICODONE) 10 MG tablet    phenazopyridine (PYRIDIUM) 100 MG tablet    sertraline (ZOLOFT) 25 MG tablet    tolterodine ER (DETROL LA) 4 MG 24 hr capsule    tolterodine ER (DETROL LA) 4 MG 24 hr capsule    " triamcinolone (KENALOG) 0.1 % external ointment    zolpidem (AMBIEN) 10 MG tablet    estradiol (ESTRACE) 0.1 MG/GM vaginal cream     No current facility-administered medications for this visit.       IMP:  Complex mixed UI with urge/freq, small bladder, hx pelvic radiation  2.   Suboptimal compliance with bladder med  3.   Hx uterine cancer, large uterus on CT last year  4.   Other medical issues      PLAN:  1. Discussed situation with patient in detail.  2. Again suggested moderation of water; try sugarless gum/candy, Biotene spray  3. Stressed importance of Gyn follow up given her hx and CT  4. Re-try Detrol; discussed rationale, mech of action, expectations, etc; pt expresses understanding  5. RTC 3 mos to review progress  6. Again set realistic expectations  7. Total time spent in reviewing patient's previous records, discussion with patient and documentation: 20 minutes

## 2024-03-01 DIAGNOSIS — N95.2 ATROPHIC VAGINITIS: ICD-10-CM

## 2024-03-01 RX ORDER — ESTRADIOL 0.1 MG/G
CREAM VAGINAL
Qty: 42.5 G | Refills: 3 | Status: ON HOLD | OUTPATIENT
Start: 2024-03-01 | End: 2024-07-16

## 2024-03-26 ENCOUNTER — TELEPHONE (OUTPATIENT)
Dept: ONCOLOGY | Facility: CLINIC | Age: 69
End: 2024-03-26
Payer: COMMERCIAL

## 2024-03-26 DIAGNOSIS — N85.8 ABNORMAL COLLECTION OF FLUID IN UTERINE CAVITY: Primary | ICD-10-CM

## 2024-03-26 DIAGNOSIS — C53.8 MALIGNANT NEOPLASM OF OVERLAPPING SITES OF CERVIX (H): ICD-10-CM

## 2024-03-26 NOTE — TELEPHONE ENCOUNTER
Dany Perez MD  You; Nicholas Jarrell, RN; Do Mendiola APRN CNP49 minutes ago (11:45 AM)     AO  I have replied to this and similar messages multiple times.    The patient likely has a gynecologic reason for her symptoms because of enlarged uterus. We need to stop trying to send her to Urology. It is almost certainly not their issue. The message just has to be that she needs to make an appointment that she can keep. I am happy to keep rescheduling her as I know she has some challenges getting to appointments, but I'm not willing to do it on short notice given her history of no showing appointments. I think my next reasonable clinic option would be to overbook her into my April 23rd clinic if she would like to take that appointment. I also would like to have a pelvic ultrasound prior if possible. It could be same day.    Thanks,  Dany     Attempted to reach pt to discuss recommendations from Dr Perez, but pt did not answer.  Left message for pt to return call.    Zohra Peterson RN on 3/26/2024 at 12:39 PM

## 2024-03-26 NOTE — TELEPHONE ENCOUNTER
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 Her last laser ablation was in 6/2021. 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  States that she saw urology on 2/28/2024 for her overactive bladder.  She has been using detrol daily as was recommended.  Pt states that she continues to have symptoms of urinary incontinence, frequency, urgency, and pain and burning in her vaginal area.  She has tried estrace cream and this also causes pain and burning in vaginal area.   Feels like her symptoms are actually worse than they were one month ago when she was seen in urology clinic.   Denies fever, malodorous urine, or hematuria. States that she does have intermittent lower back pain.  Also reports intermittent abdominal bloating.  States that she has a soft bowel movement every day; denies problems with constipation.     Recommendations:     Writer advised pt to reach out to urology clinic with update on her symptoms.  Will also send to Gyn Onc team for review.  Pt is scheduled to see Dr Perez on  4/16/2024, but she is wondering if this appt could be moved up.    Patient verbalized understanding and agreement with plan.  Patient was instructed to call the clinic with any questions, concerns, or worsening symptoms.    Zohra Peterson RN on 3/26/2024 at 9:13 AM

## 2024-03-26 NOTE — PROGRESS NOTES
Gyn Onc Attending Note   Patient calling in with persistent bladder symptoms. Documentation provided for clarification of the patient's situation in case this arises in the future. Patient has imaging showing likely distension of the uterus which can cause bladder symptoms particularly urgency and frequency. These symptoms are very likely due to this gynecologic finding rather than something truly urologic (although the patient does have baseline dysuria which is unchanged for many years and is likely related to radiation therapy). As such, I would recommend pelvic imaging (ideally MRI, but patient hasn't been able to do that, so will do pelvic ultrasound due to the relative ease of obtaining this) and visit with me to discuss next steps. She has had multiple canceled and no show appointments for various reasons, but we will continue to try to see the patient to karen in work-up and symptom management.     Dany Perez MD   Gynecologic Oncology

## 2024-03-26 NOTE — TELEPHONE ENCOUNTER
Pt was notified with recommendations per Dr Perez, and pt agrees with this plan.  Pt states that she would prefer to see Dr Perez on 4/23/2024 in English and have the  ultrasound done on the same day. Pt states that she can come at any time on 4/23/24 for the appts.    Will route to care team for pelvic ultrasound orders.   Will also have RNCC assist to help determine best time to double book Dr Perez's 4/23/24 clinic.    Patient verbalized understanding and agreement with plan.  Patient was instructed to call the clinic with any questions, concerns, or worsening symptoms.    Zohra Peterson RN on 3/26/2024 at 1:28 PM

## 2024-03-27 NOTE — TELEPHONE ENCOUNTER
Nicholas Jarrell, RN  You; Dany Perez MD18 hours ago (1:50 PM)     AS  I know she doesn't like super early appts - and since she will need an ultrasound prior - I would say either 2:45 pm or 3:15 pm on 4/23 (if that sounds ok with Dr. Perez).  That way we can keep her New Patient slots open.    Thanks,  Dany Palmer MD  You; Nicholas Jarrell RN15 hours ago (5:00 PM)     AO  That sounds fine. Sorry I meant to put in the orders and must have gotten side tracked! I can put the orders in.    Thanks! Dany     Pt has been scheduled for pelvic ultrasound and appt with Dr Perez on 4/23/2024.  Appt with Dr Perez at [facility] [place] [facility] has been cancelled.    Left  message for pt to call back to review appt details and also to review prep for ultrasound.    Zohra Peterson RN on 3/27/2024 at 8:52 AM

## 2024-03-28 NOTE — TELEPHONE ENCOUNTER
Writer talked with pt today. She was notified with appt details for both appts on 4/23/2024. Writer also reviewed prep for ultrasound with pt.  Per pt request, will also send ultrasound prep instructions to pt via [employer] message.    Patient verbalized understanding and agreement with plan.  Patient was instructed to call the clinic with any questions, concerns, or worsening symptoms.    Zohra Peterson RN on 3/28/2024 at 9:33 AM

## 2024-04-02 DIAGNOSIS — N32.81 OAB (OVERACTIVE BLADDER): ICD-10-CM

## 2024-04-02 RX ORDER — TOLTERODINE 4 MG/1
4 CAPSULE, EXTENDED RELEASE ORAL DAILY
Qty: 90 CAPSULE | Refills: 3 | Status: SHIPPED | OUTPATIENT
Start: 2024-04-02 | End: 2024-04-11

## 2024-04-02 NOTE — TELEPHONE ENCOUNTER
Refill prescription approved per [facility] Refill Protocol. Last OV= 02/2024.    Pending Prescriptions:                       Disp   Refills    tolterodine ER (DETROL LA) 4 MG 24 hr cap*90 cap*3            Sig: Take 1 capsule (4 mg) by mouth daily      Gretchen JOHNSTON RN Urology 4/2/2024 3:46 PM     Patient on left arm precautions

## 2024-04-08 ENCOUNTER — NURSE TRIAGE (OUTPATIENT)
Dept: UROLOGY | Facility: CLINIC | Age: 69
End: 2024-04-08
Payer: COMMERCIAL

## 2024-04-08 NOTE — TELEPHONE ENCOUNTER
68 year old female calls with lower abdominal pain(R>L) and pain with urination   She states she has urgency and incontinence.  She is trying to drink fluids but then she has to urinate all the time   She is taking detrol as directed  She is no longer taking pyridium as she is out of medication   That has helped her in the past    She denies blood in urine and fever   Forwarding to urology team to follow-up                 Reason for Disposition   Patient wants to be seen    Additional Information   Negative: Shock suspected (e.g., cold/pale/clammy skin, too weak to stand, low BP, rapid pulse)   Negative: Sounds like a life-threatening emergency to the triager   Negative: Followed a female genital area injury (e.g., labia, vagina, vulva)   Negative: Followed a male genital area injury (penis, scrotum)   Negative: Vaginal discharge   Negative: Pus (white, yellow) or bloody discharge from end of penis   Negative: Pain or burning with passing urine (urination) and pregnant   Negative: Pain or burning with passing urine (urination) and female   Negative: Pain or burning with passing urine (urination) and male   Negative: Pain or itching in the vulvar area   Negative: Pain in scrotum is main symptom   Negative: Blood in the urine is main symptom   Negative: Symptoms arising from use of a urinary catheter (e.g., Coude, Shahid)   Negative: Decreased urination and drinking very little and dehydration suspected (e.g., dark urine, no urine > 12 hours, very dry mouth, very lightheaded)   Negative: Patient sounds very sick or weak to the triager   Negative: Unable to urinate (or only a few drops) > 4 hours and bladder feels very full (e.g., palpable bladder or strong urge to urinate)   Negative: Fever > 100.4 F  (38.0 C)   Negative: Side (flank) or lower back pain present   Negative: Bad or foul-smelling urine   Negative: Urinating more frequently than usual (i.e., frequency)   Negative: Can't control passage of urine (i.e.,  "urinary incontinence) and new-onset (< 2 weeks) or worsening    Answer Assessment - Initial Assessment Questions  1. SYMPTOM: \"What's the main symptom you're concerned about?\" (e.g., frequency, incontinence)      Lower abdominal pain and pain with urination  2. ONSET: \"When did the  pain  start?\"      Been ongoing for months but getting worse  3. PAIN: \"Is there any pain?\" If Yes, ask: \"How bad is it?\" (Scale: 1-10; mild, moderate, severe)      10  4. CAUSE: \"What do you think is causing the symptoms?\"      Not sure  5. OTHER SYMPTOMS: \"Do you have any other symptoms?\" (e.g., blood in urine, fever, flank pain, pain with urination)      Pain with urination   6. PREGNANCY: \"Is there any chance you are pregnant?\" \"When was your last menstrual period?\"      no    Protocols used: Urinary Symptoms-A-[place]    "

## 2024-04-08 NOTE — TELEPHONE ENCOUNTER
Called and spoke to patient.   Patient states that her bladder pain is making it difficult to walk.   Denies fever, chills, dysuria, or hematuria.   Offered next day appointment, patient declined stating it was to far to drive to [place].   Offered next available is [place], on Thursday, appointment scheduled.   In the mean time advised to use ibuprofen or tylenol for discomfort.   Has a prescription for pyridium with refills available.   Patient will reach out or seek medical attention if symptoms worsen.   Kitty Hill RN

## 2024-04-08 NOTE — TELEPHONE ENCOUNTER
Caller reporting the following red-flag symptom(s): Patient states that her bladder pain is so bad she can hardly walk.    Per the system red-flag symptom policy, patient was instructed to:  speak with a Registered Nurse    Action:  Patient warm transferred to a Registered Nurse

## 2024-04-11 ENCOUNTER — OFFICE VISIT (OUTPATIENT)
Dept: UROLOGY | Facility: CLINIC | Age: 69
End: 2024-04-11
Payer: COMMERCIAL

## 2024-04-11 VITALS
DIASTOLIC BLOOD PRESSURE: 86 MMHG | WEIGHT: 167.8 LBS | HEART RATE: 69 BPM | OXYGEN SATURATION: 97 % | BODY MASS INDEX: 29.72 KG/M2 | SYSTOLIC BLOOD PRESSURE: 162 MMHG

## 2024-04-11 DIAGNOSIS — N32.81 OAB (OVERACTIVE BLADDER): Primary | ICD-10-CM

## 2024-04-11 DIAGNOSIS — R82.90 NONSPECIFIC FINDING ON EXAMINATION OF URINE: ICD-10-CM

## 2024-04-11 LAB
ALBUMIN UR-MCNC: ABNORMAL MG/DL
APPEARANCE UR: CLEAR
BACTERIA #/AREA URNS HPF: ABNORMAL /HPF
BILIRUB UR QL STRIP: NEGATIVE
COLOR UR AUTO: YELLOW
GLUCOSE UR STRIP-MCNC: NEGATIVE MG/DL
HGB UR QL STRIP: ABNORMAL
KETONES UR STRIP-MCNC: NEGATIVE MG/DL
LEUKOCYTE ESTERASE UR QL STRIP: ABNORMAL
NITRATE UR QL: POSITIVE
PH UR STRIP: 7 [PH] (ref 5–7)
RBC #/AREA URNS AUTO: ABNORMAL /HPF
SP GR UR STRIP: 1.01 (ref 1–1.03)
UROBILINOGEN UR STRIP-ACNC: 1 E.U./DL
WBC #/AREA URNS AUTO: >100 /HPF

## 2024-04-11 PROCEDURE — 99213 OFFICE O/P EST LOW 20 MIN: CPT | Performed by: UROLOGY

## 2024-04-11 PROCEDURE — 81001 URINALYSIS AUTO W/SCOPE: CPT | Performed by: UROLOGY

## 2024-04-11 PROCEDURE — 87086 URINE CULTURE/COLONY COUNT: CPT | Performed by: UROLOGY

## 2024-04-11 RX ORDER — NITROFURANTOIN 25; 75 MG/1; MG/1
100 CAPSULE ORAL 2 TIMES DAILY
Qty: 10 CAPSULE | Refills: 0 | Status: SHIPPED | OUTPATIENT
Start: 2024-04-11 | End: 2024-04-16

## 2024-04-11 NOTE — PROGRESS NOTES
"F/u urge/freq/mixed UI 15 yrs after pelvic XRT for SCCA cervix, cysto capacity 150 ml, large fluids        Reviewed PMH in detail including depression, HTN, chronic pain with opioid dependence, gastric bypass, paranoid schizophrenia, vaginal pain and bleeding, morbid obesity, low back pain, lower extremity edema        2/15/23 Advised to moderate fluids, start Detrol     4/19/23 Intermittently compliant with Detrol, CT shows large uterus >> advised to see GYN    2/28/24 Moderate water, re-try Detrol, to Gyn for uterus      Compliant with Detrol.    Still with \"pains\" across her entire lower abdomen. Specifically denies dysuria, gross hematuria, daytime frequency; can go 2 hours between voids during the day but voids about q hour at nite.    Has been using sugarless candy.    Trying to limit fluids; now at half cup of coffee, 0-1 soda, 2 [place] per day.    BM's are satisfactory.     Still on hydrochlorothiazide.        Current Outpatient Medications   Medication Sig Dispense Refill    acetaminophen (TYLENOL) 500 MG tablet Take 1,000 mg by mouth      albuterol (PROAIR HFA/PROVENTIL HFA/VENTOLIN HFA) 108 (90 Base) MCG/ACT inhaler Inhale 1-2 puffs into the lungs as needed      amLODIPine (NORVASC) 5 MG tablet Take 10 mg by mouth daily      calcium Citrate-vitamin D 500-400 MG-UNIT CHEW Take 1 tablet by mouth      childrens multivitamin w/iron (FLINTSTONES COMPLETE) 60 MG chewable tablet Take 2 tablets by mouth      cholecalciferol 125 MCG (5000 UT) CAPS Take 5000 mg 4 times a week      docusate sodium (COLACE) 100 MG capsule Take 100 mg by mouth      estradiol (ESTRACE) 0.1 MG/GM vaginal cream Place vaginally twice a week 42.5 g 3    hydrochlorothiazide (HYDRODIURIL) 25 MG tablet Take 25 mg by mouth      HYDROcodone-acetaminophen (NORCO) 5-325 MG tablet Take 1-2 tablets by mouth every 6 hours as needed for moderate to severe pain 6 tablet 0    lidocaine (XYLOCAINE) 5 % external ointment Apply 1 Application topically as " needed      lidocaine-prilocaine (EMLA) 2.5-2.5 % external cream       LORazepam (ATIVAN) 1 MG tablet Take 1 tablet (1 mg) by mouth every 6 hours as needed for anxiety 2 tablet 0    Multiple Vitamin (MULTI-VITAMIN) per tablet Take 1 tablet by mouth daily.      NARCAN 4 MG/0.1ML nasal spray TK UTD PER PACKAGE INSERT  0    oxyCODONE IR (ROXICODONE) 10 MG tablet Take 1 tablet (10 mg) by mouth 3 times daily as needed Do not take while taking Vicodin.      phenazopyridine (PYRIDIUM) 100 MG tablet Take 1 tablet (100 mg) by mouth 3 times daily as needed for urinary tract discomfort 90 tablet 3    sertraline (ZOLOFT) 25 MG tablet Take 1 tablet by mouth daily.      tolterodine ER (DETROL LA) 4 MG 24 hr capsule Take 1 capsule (4 mg) by mouth daily 90 capsule 3    tolterodine ER (DETROL LA) 4 MG 24 hr capsule Take 1 capsule (4 mg) by mouth daily 90 capsule 0    tolterodine ER (DETROL LA) 4 MG 24 hr capsule Take 1 capsule (4 mg) by mouth daily 90 capsule 0    triamcinolone (KENALOG) 0.1 % external ointment Apply a pea sized amount to the vulva and vaginal opening as needed for irritation. May mix with nystatin, but do not use more than 1x daily 30 g 3    zolpidem (AMBIEN) 10 MG tablet Take 10 mg by mouth       No current facility-administered medications for this visit.       PE: Considerable LE edema      Results for orders placed or performed in visit on 04/11/24   UA reflex to Microscopic     Status: Abnormal   Result Value Ref Range    Color Urine Yellow Colorless, Straw, Light Yellow, Yellow    Appearance Urine Clear Clear    Glucose Urine Negative Negative mg/dL    Bilirubin Urine Negative Negative    Ketones Urine Negative Negative mg/dL    Specific Gravity Urine 1.010 1.003 - 1.035    Blood Urine Moderate (A) Negative    pH Urine 7.0 5.0 - 7.0    Protein Albumin Urine Trace (A) Negative mg/dL    Urobilinogen Urine 1.0 0.2, 1.0 E.U./dL    Nitrite Urine Positive (A) Negative    Leukocyte Esterase Urine Large (A) Negative    Urine Microscopic Exam     Status: Abnormal   Result Value Ref Range    Bacteria Urine Moderate (A) None Seen /HPF    RBC Urine 10-25 (A) 0-2 /HPF /HPF    WBC Urine >100 (A) 0-5 /HPF /HPF       IMP:  1. Probable UTI  2. OAB, on Detrol  3. Chronic abd/pelvic pain issues, other medical issues        PLAN:  Discussed situation with patient in detail.  UC; emperic Macrobid  Continue fluid moderation, hydrochlorothiazide, Detrol  RTC 3 mos to review progress  Set realistic expectations  Total time spent in reviewing patient's previous records, discussion with patient and documentation: 20 minutes

## 2024-04-12 ENCOUNTER — TELEPHONE (OUTPATIENT)
Dept: UROLOGY | Facility: CLINIC | Age: 69
End: 2024-04-12
Payer: COMMERCIAL

## 2024-04-12 LAB — BACTERIA UR CULT: NORMAL

## 2024-04-12 NOTE — TELEPHONE ENCOUNTER
Writer called and left VM for patient to stop ABX. Pt can call back with any questions. My Chart message sent to patient.    Gretchen JOHNSTON RN Urology 4/12/2024 4:01 PM  .

## 2024-04-12 NOTE — TELEPHONE ENCOUNTER
----- Message from Enoch Merritt MD sent at 4/12/2024 12:54 PM CDT -----  UC negative. Pls call pt to stop abx    SMB

## 2024-04-23 ENCOUNTER — ONCOLOGY VISIT (OUTPATIENT)
Dept: ONCOLOGY | Facility: CLINIC | Age: 69
End: 2024-04-23
Attending: OBSTETRICS & GYNECOLOGY
Payer: COMMERCIAL

## 2024-04-23 ENCOUNTER — ANCILLARY PROCEDURE (OUTPATIENT)
Dept: ULTRASOUND IMAGING | Facility: CLINIC | Age: 69
End: 2024-04-23
Attending: OBSTETRICS & GYNECOLOGY
Payer: COMMERCIAL

## 2024-04-23 VITALS
HEART RATE: 80 BPM | DIASTOLIC BLOOD PRESSURE: 78 MMHG | WEIGHT: 168 LBS | SYSTOLIC BLOOD PRESSURE: 163 MMHG | RESPIRATION RATE: 16 BRPM | OXYGEN SATURATION: 100 % | BODY MASS INDEX: 29.76 KG/M2

## 2024-04-23 DIAGNOSIS — N85.8 ABNORMAL COLLECTION OF FLUID IN UTERINE CAVITY: ICD-10-CM

## 2024-04-23 DIAGNOSIS — N85.8 UTERINE MASS: Primary | ICD-10-CM

## 2024-04-23 DIAGNOSIS — C53.8 MALIGNANT NEOPLASM OF OVERLAPPING SITES OF CERVIX (H): ICD-10-CM

## 2024-04-23 DIAGNOSIS — N39.45 CONTINUOUS LEAKAGE OF URINE: ICD-10-CM

## 2024-04-23 PROCEDURE — 76830 TRANSVAGINAL US NON-OB: CPT | Performed by: RADIOLOGY

## 2024-04-23 PROCEDURE — G0463 HOSPITAL OUTPT CLINIC VISIT: HCPCS | Performed by: OBSTETRICS & GYNECOLOGY

## 2024-04-23 PROCEDURE — 76856 US EXAM PELVIC COMPLETE: CPT | Performed by: RADIOLOGY

## 2024-04-23 PROCEDURE — 99214 OFFICE O/P EST MOD 30 MIN: CPT | Performed by: OBSTETRICS & GYNECOLOGY

## 2024-04-23 ASSESSMENT — PAIN SCALES - GENERAL: PAINLEVEL: EXTREME PAIN (9)

## 2024-04-23 NOTE — LETTER
4/23/2024         RE: Alis Hartman  [address]  [place] [address]        Dear Colleague,    Thank you for referring your patient, Alis Hartman, to the [facility]. Please see a copy of my visit note below.    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 Also with history of radiation induced necrosis.     She presents today for follow-up after a long absence from our care. About a year ago she had a CT urogram which showed fluid within the uterus. An MRI was recommended by our clinic, unfortunately she has not been able to have this completed. She had an ultrasound today prior to this visit to evaluate previously seen abnormalities.     Since last visit she reports significant worsening of her urinary incontinence with nearly constant leakage. She also notes new onset pelvic pain. No vaginal bleeding.     Relevant history:  Oncology History   Cervical cancer (H)   3/1996 Initial Diagnosis    Cervical cancer    Moderately differentiated squamous cell carcinoma. She was treated with primary radiation therapy, unsure if she also had chemotherapy or not.  [place] [facility] [place]     12/22/2014 Procedure    Exam under anesthesia with LASER to the vagina for VAIN 3     5/24/2019 Procedure    12/27/18:  Pap:  HSIL, HPV+  5/24/19: PROCEDURES: Vaginal colposcopy, vaginal biopsy, CO2 laser of the vagina  VAGINAL BIOPSY:   - High grade squamous intraepithelial lesion (vaginal intraepithelial neoplasia 3)      3/12/2020 Procedure    10/14/19: Pap HSIL/other HPV+  2/28/2020: biopsy of vagina Vain III; MRI recommended prior to OR; however, patient did not complete this  3/12/2020: EUA,  biopsies, LASER: VAIN III     6/8/2021 Procedure    6/8/2021: Exam under anesthesia, vaginal biopsies, laser ablation of the upper vagina with Dr. Perez, biopsies returned showing necrosis, no dysplasia. Hyperbaric oxygen therapy discussed and referral place. Patient was unable to pursue this due to need for transportation to the [facility].     4/6/2023 Imaging    4/6/2023 CT Urogram: IMPRESSION:  1.  Negative CT urogram. No urinary calculi, solid renal mass, or evidence of upper tract urothelial malignancy.   2.  Enlarged uterus, with marked distention of the endometrial canal by intermediate density material, possibly a mix of fluid and blood products. This may be related to cervical stenosis given the history of prior pelvic radiation. Uterine enlargement may contribute to urinary frequency/urgency.   3.  Indeterminate left pelvic/perirectal mass with rim calcifications measuring up to 4.5 cm. Differentials include an old hematoma or an atypical enlarged lymph node. Consider pelvic MRI with contrast and subtraction imaging for further evaluation. The uterus and endometrium can also be further evaluated at that time.             Physical Exam:  BP (!) 163/78 (BP Location: Right arm)   Pulse 80   Resp 16   Wt 76.2 kg (168 lb)   SpO2 100%   BMI 29.76 kg/m     General appearance: no acute distress, well groomed, sitting comfortably   Abd: large, firm mass palpable in the lower abdomen/pelvis which is immobile  Lymph: left inguinal region with 2-3 enlarged lymph nodes which are somewhat mobile, right groin without enlarged lymph nodes  :  External: vulva/labia overall normal in appearance with some skin changes of radiation therapy/scarring from radiation induced necrosis. No lesions. On exam there is near constant leakage of urine from the urethra  Vagina: significant pallor of the mucosa extending out to the keratinized epithelium, there is some telangiectasias surrounding the urethra, within the vagina  there is pallor of the skin and there is scarring with agglutiation of the upper vagina. Cervix is not visualized. The vagina has urine within it, but there is no obvious area of fistula and this is likely due to constant leakage from the urethra into the vagina on exam versus true fistula, although that remains a possibility. On bimanual exam it is very difficult to appreciate the   Cervix: Unable to visualize due to agglutination of the upper vagina  Uterus/adnexa: firmness appreciated, but true evaluation is limited by vaginal agglutination     Labs/Pathology:  Last Pap smear was ASCUS H/other high risk HPV positive.  Colposcopy was performed at that time which did not show any obvious lesions and patient did not return for subsequent follow-up    Imaging review:  Personally reviewed ultrasound images from today showing a heterogenous mostly solid mass within the uterus areas of fluid with likely blood but most of the endometrium is filled with soft tissue.     Assessment:  Aranza Hartman is a 68 year old  who has a history of cervix cancer treated with primary radiation who more recently has a history of recurrent VAIN III treated with laser now with new pelvic mass on imaging after a gap in care.    Plan:  -Uterine/pelvic mass: imaging as above. Concern for possible uterine malignancy. Will perform CT CAP to evaluate for any evidence of metastatic spread. If none, likely will need pelvic EUA with attempted biopsy in the OR.    - Urinary incontinence: likely worsened due to pressure from uterine process on the bladder. There is a possibility of vesicovaginal fistula, however, on exam she has constant leakage from the urethra with concern for intrinsic urethral sphincter insufficiency likely secondary to radiation therapy. Follows with Urology. Further follow-up pending results of work up for uterine mass as above.     Dany Perez MD   Gynecologic Oncology     I spent a total of 30 minutes on  the care of Juanmariana AMINAH Hartman on the day of service including face to face time, care coordination, and documentation on the day of service.       Again, thank you for allowing me to participate in the care of your patient.        Sincerely,        Dany Perez MD

## 2024-04-23 NOTE — PROGRESS NOTES
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 Also with history of radiation induced necrosis.     She presents today for follow-up after a long absence from our care. About a year ago she had a CT urogram which showed fluid within the uterus. An MRI was recommended by our clinic, unfortunately she has not been able to have this completed. She had an ultrasound today prior to this visit to evaluate previously seen abnormalities.     Since last visit she reports significant worsening of her urinary incontinence with nearly constant leakage. She also notes new onset pelvic pain. No vaginal bleeding.     Relevant history:  Oncology History   Cervical cancer (H)   3/1996 Initial Diagnosis    Cervical cancer    Moderately differentiated squamous cell carcinoma. She was treated with primary radiation therapy, unsure if she also had chemotherapy or not.  This treatment was at [facility].     12/22/2014 Procedure    Exam under anesthesia with LASER to the vagina for VAIN 3     5/24/2019 Procedure    12/27/18:  Pap:  HSIL, HPV+  5/24/19: PROCEDURES: Vaginal colposcopy, vaginal biopsy, CO2 laser of the vagina  VAGINAL BIOPSY:   - High grade squamous intraepithelial lesion (vaginal intraepithelial neoplasia 3)      3/12/2020 Procedure    10/14/19: Pap HSIL/other HPV+  2/28/2020: biopsy of vagina Vain III; MRI recommended prior to OR; however, patient did not complete this  3/12/2020: EUA, biopsies, LASER: VAIN III     6/8/2021 Procedure    6/8/2021: Exam under anesthesia, vaginal biopsies, laser ablation of the upper vagina with Dr. Perez, biopsies returned showing necrosis, no dysplasia. Hyperbaric oxygen therapy discussed and referral place. Patient was unable  to pursue this due to need for transportation to the [facility].     4/6/2023 Imaging    4/6/2023 CT Urogram: IMPRESSION:  1.  Negative CT urogram. No urinary calculi, solid renal mass, or evidence of upper tract urothelial malignancy.   2.  Enlarged uterus, with marked distention of the endometrial canal by intermediate density material, possibly a mix of fluid and blood products. This may be related to cervical stenosis given the history of prior pelvic radiation. Uterine enlargement may contribute to urinary frequency/urgency.   3.  Indeterminate left pelvic/perirectal mass with rim calcifications measuring up to 4.5 cm. Differentials include an old hematoma or an atypical enlarged lymph node. Consider pelvic MRI with contrast and subtraction imaging for further evaluation. The uterus and endometrium can also be further evaluated at that time.             Physical Exam:  BP (!) 163/78 (BP Location: Right arm)   Pulse 80   Resp 16   Wt 76.2 kg (168 lb)   SpO2 100%   BMI 29.76 kg/m     General appearance: no acute distress, well groomed, sitting comfortably   Abd: large, firm mass palpable in the lower abdomen/pelvis which is immobile  Lymph: left inguinal region with 2-3 enlarged lymph nodes which are somewhat mobile, right groin without enlarged lymph nodes  :  External: vulva/labia overall normal in appearance with some skin changes of radiation therapy/scarring from radiation induced necrosis. No lesions. On exam there is near constant leakage of urine from the urethra  Vagina: significant pallor of the mucosa extending out to the keratinized epithelium, there is some telangiectasias surrounding the urethra, within the vagina there is pallor of the skin and there is scarring with agglutiation of the upper vagina. Cervix is not visualized. The vagina has urine within it, but there is no obvious area of fistula and this is likely due to constant leakage from the urethra into the vagina on exam versus true  fistula, although that remains a possibility. On bimanual exam it is very difficult to appreciate the   Cervix: Unable to visualize due to agglutination of the upper vagina  Uterus/adnexa: firmness appreciated, but true evaluation is limited by vaginal agglutination     Labs/Pathology:  Last Pap smear was ASCUS H/other high risk HPV positive.  Colposcopy was performed at that time which did not show any obvious lesions and patient did not return for subsequent follow-up    Imaging review:  Personally reviewed ultrasound images from today showing a heterogenous mostly solid mass within the uterus areas of fluid with likely blood but most of the endometrium is filled with soft tissue.     Assessment:  Aranza [Shira Hartman is a 68 year old  who has a history of cervix cancer treated with primary radiation who more recently has a history of recurrent VAIN III treated with laser now with new pelvic mass on imaging after a gap in care.    Plan:  -Uterine/pelvic mass: imaging as above. Concern for possible uterine malignancy. Will perform CT CAP to evaluate for any evidence of metastatic spread. If none, likely will need pelvic EUA with attempted biopsy in the OR.    - Urinary incontinence: likely worsened due to pressure from uterine process on the bladder. There is a possibility of vesicovaginal fistula, however, on exam she has constant leakage from the urethra with concern for intrinsic urethral sphincter insufficiency likely secondary to radiation therapy. Follows with Urology. Further follow-up pending results of work up for uterine mass as above.     Dany Perez MD   Gynecologic Oncology     I spent a total of 30 minutes on the care of Alis Hartman on the day of service including face to face time, care coordination, and documentation on the day of service.

## 2024-04-23 NOTE — NURSING NOTE
"Oncology Rooming Note    April 23, 2024 3:12 PM   Alis Hartman is a 68 year old female who presents for:    Chief Complaint   Patient presents with    Oncology Clinic Visit     Follow up     Initial Vitals: BP (!) 163/78 (BP Location: Right arm)   Pulse 80   Resp 16   Wt 76.2 kg (168 lb)   SpO2 100%   BMI 29.76 kg/m   Estimated body mass index is 29.76 kg/m  as calculated from the following:    Height as of 2/21/23: 1.6 m (5' 3\").    Weight as of this encounter: 76.2 kg (168 lb). Body surface area is 1.84 meters squared.  Extreme Pain (9) Comment: Data Unavailable   No LMP recorded. Patient is postmenopausal.  Allergies reviewed: Yes  Medications reviewed: Yes    Medications: Medication refills not needed today.  Pharmacy name entered into [employer]:    [id number] - [facility], [place] - [address]  [id number], [place] - [address] [id number], [place] - [address] [id number], [place] - [address] AT [facility] [address]    Frailty Screening:   Is the patient here for a new oncology consult visit in cancer care? 2. No      Clinical concerns: vaginal pain persists       April CHARLINE Landaverde              "

## 2024-04-24 ENCOUNTER — PATIENT OUTREACH (OUTPATIENT)
Dept: ONCOLOGY | Facility: CLINIC | Age: 69
End: 2024-04-24
Payer: COMMERCIAL

## 2024-04-24 DIAGNOSIS — N39.498 OTHER URINARY INCONTINENCE: Primary | ICD-10-CM

## 2024-04-24 DIAGNOSIS — R35.0 URINARY FREQUENCY: ICD-10-CM

## 2024-04-24 DIAGNOSIS — R26.89 DECREASED MOBILITY: ICD-10-CM

## 2024-04-24 DIAGNOSIS — Z91.81 AT MODERATE RISK FOR FALL: ICD-10-CM

## 2024-04-24 NOTE — PROGRESS NOTES
[facility]: Cancer Care Note                                                          Received voicemail message from patient this morning, requesting a DME order for a commode to use at home.  Per patient, she has a difficult time getting up and going to the bathroom so frequently in her home, and also continues to have incontinence issues.  She is also worried about falling given how often she has to ambulate to the bathroom during the night.  She feels that a commode in her bedroom would be very helpful.    DME order was entered and pended, and encounter was routed for Dr. Perez to review and advise.      If DME order is approved by Dr. Perez, patient requests to have it sent to [employer] (phone number: [phone]; fax number: [phone]).    Nicholas Jarrell, RN, BSN, OCN  RN Care Coordinator - Oncology  [facility]

## 2024-04-26 NOTE — PROGRESS NOTES
[facility]: Cancer Care Note                                                          Writer placed telephone call to patient this morning, to inform her that the DME order for a beside commode has been signed by Dr. Perez - and [email or web address] (fax number: [phone]).    Patient did not answer call at this time - voicemail message was left providing above update.  Encouraged patient to reach back out if she has any further needs, questions, or concerns.      Nicholas Jarrell, RN, BSN, OCN  RN Care Coordinator - Oncology  [facility]

## 2024-05-03 ENCOUNTER — TELEPHONE (OUTPATIENT)
Dept: ONCOLOGY | Facility: CLINIC | Age: 69
End: 2024-05-03

## 2024-05-03 ENCOUNTER — ANCILLARY PROCEDURE (OUTPATIENT)
Dept: CT IMAGING | Facility: CLINIC | Age: 69
End: 2024-05-03
Attending: OBSTETRICS & GYNECOLOGY
Payer: COMMERCIAL

## 2024-05-03 DIAGNOSIS — N85.8 UTERINE MASS: ICD-10-CM

## 2024-05-03 LAB
CREAT BLD-MCNC: 1.4 MG/DL (ref 0.5–1)
EGFRCR SERPLBLD CKD-EPI 2021: 40 ML/MIN/1.73M2

## 2024-05-03 PROCEDURE — 71260 CT THORAX DX C+: CPT | Performed by: RADIOLOGY

## 2024-05-03 PROCEDURE — 82565 ASSAY OF CREATININE: CPT

## 2024-05-03 PROCEDURE — 74177 CT ABD & PELVIS W/CONTRAST: CPT | Performed by: RADIOLOGY

## 2024-05-03 RX ORDER — IOPAMIDOL 755 MG/ML
92 INJECTION, SOLUTION INTRAVASCULAR ONCE
Status: COMPLETED | OUTPATIENT
Start: 2024-05-03 | End: 2024-05-03

## 2024-05-03 RX ADMIN — IOPAMIDOL 92 ML: 755 INJECTION, SOLUTION INTRAVASCULAR at 13:23

## 2024-05-04 NOTE — TELEPHONE ENCOUNTER
Received urgent page about CT Chest/Abdomen completed today from on-call radiologist.    They noted newly identified bilateral moderate hydronephrosis, however previous imaging was completed 4/06/23 showing normal appearing kidneys so uncertain about acute versus chronic origin.     Patient saw primary care yesterday with no acute changes and non toxic appearance, Cr has doubled since 2023 0.67>1.4.    Discussed with fellow and did not feel this was an acute change in status, will defer to primary Oncologist Dr. Perez to follow up with patient and discuss final read.    Discussed with Dr. Fabrice Mohr DO, MA  N GYN ONC- PGY2  7:34 PM May 3, 2024

## 2024-05-07 ENCOUNTER — VIRTUAL VISIT (OUTPATIENT)
Dept: ONCOLOGY | Facility: CLINIC | Age: 69
End: 2024-05-07
Attending: OBSTETRICS & GYNECOLOGY
Payer: COMMERCIAL

## 2024-05-07 DIAGNOSIS — N39.498 OTHER URINARY INCONTINENCE: ICD-10-CM

## 2024-05-07 DIAGNOSIS — N85.8 UTERINE MASS: Primary | ICD-10-CM

## 2024-05-07 PROCEDURE — 99212 OFFICE O/P EST SF 10 MIN: CPT | Mod: 95 | Performed by: OBSTETRICS & GYNECOLOGY

## 2024-05-07 NOTE — LETTER
5/7/2024         RE: Alis Hartman  [address]  [place] [address]        Dear Colleague,    Thank you for referring your patient, Alis Hartman, to the [facility]. Please see a copy of my visit note below.    Gynecologic Oncology Clinic - Established Patient Visit    Visit date: May 7, 2024     Reason for visit: uterine distension    Interval history: Alis Hartman is a 68 year old who has a history of cervix cancer treated with primary radiation (1996) who also has a history of VAIN III treated with laser (last 6/2021) with uterine distension on recent ultrasound and now returns to review CT.     No changes to her symptoms from previous note.    Imaging review:  I personally reviewed imaging which shows enlarged uterus with what appears to be fluid density. She also has significant hydroureteronephrosis which continues to the bladder and bladder is quite distended. Ureter is distended to insertion into the bladder with no evidence of ureteral obstruction. Left side is somewhat obscured by uterus.     Assessment:  Aranza [Shira Hartman is a 68 year old  who has a history of cervix cancer treated with primary radiation (1996) with uterine distension and new onset hydroureteronephrosis.    Plan:  No evidence of distant metastases. Unclear etiology. Possibilities include uterine malignancy with bleeding, other fluid build-up, vesicouterine fistula. I'm not sure I'll be able to access the uterus through the vagina, but this is the best option, so will try to do D&C with drainage of the uterus under ultrasound guidance.  Also with cystoscopy and possible backfill of the bladder with methylene blue to assess for evidence of vesicovaginal fistula given significant urinary leakage    Urinary retention?/hydroureteronephrosis: Unclear etiology given there doesn't apaer to be ureteral obstruction and urethral obstruction would be quite rare with  uterine mass. Additionally she has profound leakage from the urethra as noted in clinic las visit, so urethral obstruction seems unlikely. Could be primary bladder dysfunction with incomplete emptying and overflow incontinence. She is on Detrol, but reports only taking it occassionally. Message sent to Urology for recommendations. She is not amenable to catheter placement at this time, but will discuss again after speaking with Urology if indicated.     Repeat BMP next week.     Dany Perez MD     I spent a total of 15 minutes on the care of Alis Hartman on the day of service including face to face time, care coordination, and documentation on the day of service.   Gynecologic Oncology       Again, thank you for allowing me to participate in the care of your patient.        Sincerely,        Dany Perez MD

## 2024-05-07 NOTE — PROGRESS NOTES
Gynecologic Oncology Clinic - Established Patient Visit    Visit date: May 7, 2024     Reason for visit: uterine distension    Interval history: Alis Hartman is a 68 year old who has a history of cervix cancer treated with primary radiation (1996) who also has a history of VAIN III treated with laser (last 6/2021) with uterine distension on recent ultrasound and now returns to review CT.     No changes to her symptoms from previous note.    Imaging review:  I personally reviewed imaging which shows enlarged uterus with what appears to be fluid density. She also has significant hydroureteronephrosis which continues to the bladder and bladder is quite distended. Ureter is distended to insertion into the bladder with no evidence of ureteral obstruction. Left side is somewhat obscured by uterus.     Assessment:  Aranza [Shira Hartman is a 68 year old  who has a history of cervix cancer treated with primary radiation (1996) with uterine distension and new onset hydroureteronephrosis.    Plan:  No evidence of distant metastases. Unclear etiology. Possibilities include uterine malignancy with bleeding, other fluid build-up, vesicouterine fistula. I'm not sure I'll be able to access the uterus through the vagina, but this is the best option, so will try to do D&C with drainage of the uterus under ultrasound guidance.  Also with cystoscopy and possible backfill of the bladder with methylene blue to assess for evidence of vesicovaginal fistula given significant urinary leakage    Urinary retention?/hydroureteronephrosis: Unclear etiology given there doesn't apaer to be ureteral obstruction and urethral obstruction would be quite rare with uterine mass. Additionally she has profound leakage from the urethra as noted in clinic las visit, so urethral obstruction seems unlikely. Could be primary bladder dysfunction with incomplete emptying and overflow incontinence. She is on Detrol, but reports only taking it  occassionally. Message sent to Urology for recommendations. She is not amenable to catheter placement at this time, but will discuss again after speaking with Urology if indicated.     Repeat BMP next week.     Dany Perez MD     I spent a total of 15 minutes on the care of Alis Hartman on the day of service including face to face time, care coordination, and documentation on the day of service.   Gynecologic Oncology

## 2024-05-09 ENCOUNTER — TELEPHONE (OUTPATIENT)
Dept: ONCOLOGY | Facility: CLINIC | Age: 69
End: 2024-05-09
Payer: COMMERCIAL

## 2024-05-09 RX ORDER — HEPARIN SODIUM 10000 [USP'U]/ML
5000 INJECTION, SOLUTION INTRAVENOUS; SUBCUTANEOUS
Status: CANCELLED | OUTPATIENT
Start: 2024-05-09

## 2024-05-09 RX ORDER — PHENAZOPYRIDINE HYDROCHLORIDE 100 MG/1
200 TABLET, FILM COATED ORAL ONCE
Status: CANCELLED | OUTPATIENT
Start: 2024-05-09 | End: 2024-05-09

## 2024-05-09 NOTE — TELEPHONE ENCOUNTER
Spoke with patient, confirmed all scheduled information.       Patient is schedule for surgery with: Dr. Perez    Surgery Date: 5/17/2024     Location: [facility]    H&P: to be completed by surgeon will complete and/or update on day of surgery as needed      Post-op: will be scheduled by the clinic    Patient will receive a phone call from hospital pre-admission nurses 1-2 days prior to surgery with arrival time and NPO instructions. Patient aware times are subject to change up until day before surgery.     Patient questions/concerns: N/A     Surgery packet to be sent via US mail and via [email or web address]  Patient requested surgery packet be mailed to:  [address] [place] [address]  [address]  [place] [address]      Judd Londono on 5/9/2024 at 1:33 PM

## 2024-05-09 NOTE — TELEPHONE ENCOUNTER
Talked to Aranza about her bladder and hydroureteronephrosis on imaging and elevated creatine. She is taking Detrol but not everyday. Discussed if she would be open to catheter placement until her surgery to see if her creatinine improved with this intervention. She is considering this, but in the interim I told her I'd like to reach out to Dr. Merritt her Urologist for his thoughts. Will follow-up.     Dany Perez MD   Gynecologic Oncology

## 2024-05-10 ENCOUNTER — PATIENT OUTREACH (OUTPATIENT)
Dept: ONCOLOGY | Facility: CLINIC | Age: 69
End: 2024-05-10
Payer: COMMERCIAL

## 2024-05-10 DIAGNOSIS — N39.498 OTHER URINARY INCONTINENCE: ICD-10-CM

## 2024-05-10 DIAGNOSIS — N85.8 UTERINE MASS: Primary | ICD-10-CM

## 2024-05-10 NOTE — PROGRESS NOTES
[facility]: Cancer Care Follow-Up Note                                    Situation:                                                      Attempted reaching patient via telephone this afternoon, to provide information and education regarding her upcoming surgery with Dr. Perez on 5/17/24 - and to review recommendations from Dr. Perez.    Recommendations and instructions per Dr. Perez:    Stop Detrol per recommendation from Urology.  Urology recommended kent catheter placement until surgery (Dr. Perez feels this would really make patient's quality of life better, since she wouldn't be leaking urine all the time for the whole week leading up until surgery - Urology agreed).  Hold hydrochlorothiazide and Pyridium the night before and morning of surgery.    No aspirin, ibuprofen, naproxen, vitamins, or supplements for 1 week prior to surgery as per usual pre-op recommendations.  Per Dr. Perez, patient may continue taking her other prescription medications as usual.  CBC, CMP, and EKG prior to surgery.  Patient could meet with Marcia Painting CNP, for a pre-op exam next week if she is willing (and could have her labs and EKG done at that time).  Otherwise, if patient just wants to do the labs and EKG, Dr. Perez states she would be willing to update patient's H&P exam the morning of surgery.    If patient is willing to have the kent catheter placed, this could either be done during her appointment with Marcia BURR if she accepts that - otherwise, it could be a nurse-only visit for kent insertion and education on kent cares/emptying.    Assessment:                                                      Pre/Post Procedure:   Surgery/Procedure plan reviewed with patient  Planned Surgery or Procedure: Dilation and curettage of the uterus with drainage of uterine fluid under ultrasound guidance; Cystoscopy with backfill of the bladder  .  Anesthesia Type: other (see comment) (MAC)  Relevant Diagnosis:  Uterine mass; Urinary incontinence.  Preoperative Surgical Consult Date: 05/07/24  Pre-Op Physical to be completed by (e.g.: PCP, Pre-Assessment Center, etc.):: Surgeon  Post-op Appointment Date: 05/28/24    Pre-op Checklist Reviewed  Labs: needs scheduling (CBC, BMP)  Chest X-Ray: n/a  EKG: needs scheduling  Anticoagulation plan: n/a  Bowel Prep: n/a     Plan:                                                       Patient did not answer telephone call.  Voicemail message was left, requesting callback from patient.  Direct phone number for this RN was provided in the message.    Writer also sent patient a Etece message with above information and recommendations - and asked her to let us know how she would like to proceed regarding the kent catheter placement.      Request was sent to schedule team to assist patient with setting up her appointments for pre-op labs, EKG - and office visit with Marcia Painting CNP, if patient is interested.    Nicholas Jarrell, RN, BSN, OCN  RN Care Coordinator - Oncology  [facility]

## 2024-05-14 NOTE — PROGRESS NOTES
ADDENDUM:  Reviewed case with Dr. Perez. Aranza had cardiology eval yesterday- recommendation for a Lexiscan given her poor exercise tolerance- per cardiology, the atrial fibrillation itself is not a contraindication to her surgery. Discussed risks and benefits with Dr. Perez, and given that she will be having MAC sedation and that her current symptomatic mass may be contributing to her renal function and anemia, will proceed with surgery on 5/17/24 as planned.    Hold furosemide, hydrochlorothiazide, apixaban (has not yet started), Detrol, and Cogentin morning of procedure.    ERINN Hammond CNP on 5/16/2024 at 11:46 AM      Preoperative Evaluation  [address] [address]  Phone: [phone]  Fax: [phone]  Primary Provider: Maria Fernanda Eckert  Pre-op Performing Provider: DAVINA TREVINO  May 15, 2024   Aranza is a 68 year old female presenting for the following: pre-op evaluation       Surgical Information  Surgery/Procedure: D&C with drainage of the uterus under ultrasound guidance; cystoscopy and possible backfill of the bladder with methylene blue to assess for evidence of vesicovaginal fistula   Surgery Location: [facility]  Surgeon: Dany Perez MD  Surgery Date: 5/17/2024  Time of Surgery: 11:40  Where patient plans to recover: At home with family  Fax number for surgical facility: Note does not need to be faxed, will be available electronically in Epic.    Assessment & Plan     The proposed surgical procedure is considered INTERMEDIATE risk.    Preop general physical exam  Uterine mass  Hydroureteronephrosis  History of malignant neoplasm of cervix  VAIN III (vaginal intraepithelial neoplasia III)  Other urinary incontinence  Iron deficiency anemia, unspecified iron deficiency anemia type  Atrial fibrillation, unspecified type (H)    - Magnesium  - Adult Cardiology Eval  Referral  - Echocardiogram  Limited    Presenting for pre-op evaluation prior to US guided D&C for uterine distention and cystoscopy- concern for new onset atrial fibrillation and persistent iron deficiency anemia. Will need cardiology evaluation prior to surgery- expediting work up given possibility of recurrent cancer as a source of her uterine distention. Will addend note with formal pre-op recommendations pending results of cardiology evaluation and echocardiogram.    Atrial fibrillation noted on EKG and exam today- previous EKG from 2019 with NSR, no recent EKGs since then. Reviewed PCP's most recent note from 5/2/24, noted to be regular rate and rhythm on exam, no mention of atrial fibrillation. I do question if this is new or if she has had episodes of paroxysmal atrial fibrillation in the past given previous episodes of dizziness. Magnesium and TSH WNL. Today, she is hemodynamically stable, rate controlled, and asymptomatic. She already is on atenolol for hypertension. Given potential new diagnosis of atrial fibrillation, I cannot safely clear her for surgery until she has had further evaluation by cardiology. Will expedite work up- has echocardiogram scheduled for tomorrow. STAT cardiology referral placed. I have a message out to her PCP as well to discuss further. Given that she is hemodynamically stable and generally feeling well from a cardiovascular perspective, will hold off on ED transfer unless she becomes symptomatic- will try to get her in with her PCP today. Reviewed atrial fibrillation with Aranza, risks (including VTE), and when to seek further care.    Additionally, her hemoglobin is low today at 7.9- this is stable compared to her last draw 5/2/24 at her PCP's office. Unclear etiology, possible related to malabsorption after gastric bypass, but she has never had a colonoscopy. She denies black tarry stools, melena, vaginal bleeding, or hematuria. Question if anemia is contributing to atrial fibrillation. She is taking  iron supplement per her PCP, however, she is not taking this daily as ordered. May benefit from 1-2 units PRBCs prior to surgery- will discuss with Dr. Perez.    Regarding the urinary incontinence, she declines a Shahid catheter insertion today due to concerns about discomfort. Reviewed insertion technique, topical lidocaine, etc- she declines despite this and states that she will continue to use incontinence pads. She does have continuous leakage of urine.    Hope is to ultimately get her to surgery as scheduled on 5/17/24 as she is fairly symptomatic and further evaluation needs to be performed from a cancer perspective. That being said, if unable to complete work up prior to scheduled date, will need to reschedule. Dr. Perez notified.      No known MARCELINO  Implanted Device  None    Risks and Recommendations  The patient has the following additional risks and recommendations for perioperative complications:  Cardiovascular:   - Cardiology consulted- atrial fibrillation  Anemia/Bleeding/Clotting:    - Will discuss if blood transfusion is appropriate with Dr. Perez  Social and Substance:    - Patient is taking medications for chronic pain    Antiplatelet or Anticoagulation Medication Instructions   - Patient is on no antiplatelet or anticoagulation medications.    Additional Medication Instructions  Take all scheduled medications on the day of surgery EXCEPT for modifications listed below:   - ACE/ARB: Continue without modification (e.g., MAC anesthesia, neurosurgery, spine surgery, heart failure, or labile hypertension with risk of hypertension).   - Beta Blockers: Continue taking on the day of surgery.   - Calcium Channel Blockers: May be continued on the day of surgery.   - Diuretics: DO NOT TAKE on the day of surgery.   - Opioids: Continue without modification.   - pregabalin, gabapentin: Continue without modification.   - SSRIs, SNRIs, TCAs, Antipsychotics: Continue without modification.    - rescue Inhaler:  Continue PRN. Bring to hospital on the day of surgery.   - anticholinergics: DO NOT TAKE anticholingeric medication in older patient at risk of delirium.    - GLP-1 Injectable (exenitide, liraglutide, semaglutide, dulaglutide, etc.): DO NOT TAKE 7 days before surgery    - Topicals: DO NOT TAKE day of surgery.    Recommendation  Patient referred to cardiology for evaluation before surgery. Surgery approval pending completion of consultation. Will addend note with updated recommendations at that time. Dr. Perez updated.    65 minutes spent by me on the date of the encounter doing chart review, history and exam, documentation and further activities per the note    Subjective       HPI related to upcoming procedure:  Alis Hartman is a 68 year old who has a history of cervix cancer treated with primary radiation (1996) and a history of VAIN III treated with laser (last 6/2021) with uterine distension on imaging and urinary incontinence. She notes pelvic discomfort and pressure, but no vaginal bleeding. Does have chronic urinary incontinence- continuous leakage, wearing a pad. Offered a Shahid catheter prior to her surgery and she declines.        Health Care Directive  Patient does not have a Health Care Directive or Living Will: Discussed advance care planning with patient; however, patient declined at this time. She states she would want to be resuscitated and would like her son to be her decision maker, declines formal paperwork at this time.    Preoperative Review of    reviewed - controlled substances prescribed by other outside provider(s). Oxycodone 15mg four times daily, zolpidem at HS, gabapentin      Status of Chronic Conditions:  HTN: On multidrug regimen with amlodipine, losartan, hydrochlorothiazide, furosemide, and atenolol. Blood pressure control remains suboptimal. Admits she does not take the furosemide as regularly as recommended. Had been recommended to have an echocardiogram per her PCP,  but this has not been performed. Chronic bilateral lower extremity edema. Denies chest pain, palpitations, shortness of breath at rest, or severe headaches. She does have some RIZVI. Able to stand and do light chores, mostly limited by pain from her lower back and her knees rather than from cardiovascular/respiratory concerns.    Renal insufficiency: New in the last year. Unclear etiology. Creatinine stable from last draw.    Hx of HCV: She states she does not have this anymore. Recent hepatic panel without significant abnormalities.    Anemia: Multifactorial- has both B12 deficiency and iron deficiency. Recently received B12 injection. Started on an iron supplement per her PCP but has not been taking this routinely. Has not had colonoscopy. Denies black tarry stools, melena, vaginal bleeding, gross hematuria, or other visible blood loss. Did report some dizziness at her PCP's office 5/2/24- Aranza feels it was due to moving too quickly.    Schizophrenia, depression, anxiety: Follows with psychiatry. On multidrug regimen with quetiapine 400mg BID ordered (states she takes this once a day instead), sertraline.    Insomnia: Taking zolpidem per her PCP    Chronic back and knee pain: Known OA, has steroid injections when needed. Using diclofenac gel to the knees. Taking oxycodone 15mg four times daily per her PCP. Taking gabapentin as well, but she is unsure of the dosing.    Urinary incontinence: Continuous leakage. Follows with urology. Prescribed Detrol- she is unsure if she is actually still taking this. Declines a Shahid catheter today- she would prefer to continue using incontinence pads at this time.    Overweight: Hx of obesity, treated with gastric bypass in the past. Off and on liraglutide due to availability of medication- has not been able to obtain this and therefore has not taken in awhile.        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 Provider to review      Cervical cancer (H) 08/01/2012     Priority: Medium    HSIL on Pap smear 10/05/2011     Priority: Medium     Hx of cervical CA treated with radiation.  Pap in fall 2011 showed HSIL   10/26/11 biopsies show radiation changes- no dysplasia or precancerous changes.  Plan-- return for pap in 3 months.  2/29/12 pap ASCUS neg HR HPV (53). Plan-- follow up in 6 months with repeat pap  8/1/12 pap LSIL. Plan-repeat pap smear in 6 months.  If LSIL or higher at that time, will colp. (due 2/1/13) reminder placed  2/11/13 pap LSIL. Plan: If today's pap is persistent LSIL or higher degree of abnormality, then the plan will be to repeat colposcopy and biopsies  4/3/13 vulvar biopsy scheduled. Cancelled.  8/12/13 pap ASCUS neg for HR HPV.  Vaginal wall biopsy- VAIN 1.  Plan-- colposcopy  9/11/13 appt scheduled/cancelled  9/23/13 colposcopy. Findings: No evidence of recurrent cancer. Plan-- continue with routine surveillance  4/9/14 pap LSIL/neg HR HPV. Plan: repeat pap at next visit  10/13/14 pap HSIL. Plan--colp  12/22/14 colposcopy. CIS/VIN3.   1/26/15 follow up visit. Plan: Proceed with colposcopy, CO2 laser of vagina   3/2/15 CO2 laser surgery   3/9/15 post op. Plan: RTC in 6 months for a repeat pap smear and pelvic exam with NP. Discussed importance of pap smears and following this schedule, if pap abnormal will need colposcopy again in future. Continue with regular preventative screening with pcp.   9/14/15 scheduled. Tracking  06/20/18 Patient is lost to pap tracking follow-up.          Past Medical History:   Diagnosis Date    Depression     Hypertension     Malignant neoplasm of endocervix (H)      Tx with radiation    Other chronic pain     Low back    Urinary incontinence      Past Surgical History:   Procedure Laterality Date    ABDOMINOPLASTY      BIOPSY CERVICAL, LOCAL EXCISION, SINGLE/MULTIPLE  10/26/2011    Procedure:BIOPSY CERVICAL, LOCAL EXCISION, SINGLE/MULTIPLE; EUA, cervical biopsies; Surgeon:BETTY TINEO; Location:[facility] OR    BIOPSY VAGINAL N/A 6/8/2021    Procedure: BIOPSY, VAGINA;  Surgeon: Dany Perez MD;  Location: [facility] OR    EXAM UNDER ANESTHESIA PELVIC N/A 3/12/2020    Procedure: Exam under anesthsia, vaginal biopsies, possible CO2 laser of the vagina;  Surgeon: Lilliam Roy MD;  Location: [facility] OR    GI SURGERY  2004    gastric bypass    LASER CO2 VAGINA N/A 3/2/2015    Procedure: LASER CO2 VAGINA;  Surgeon: Mariela Abdalla MD;  Location: [facility] OR    LASER CO2 VAGINA N/A 5/24/2019    Procedure: Exam under anesthesia, vaginal biopsies, CO2 laser of the vagina;  Surgeon: Lilliam Roy MD;  Location: [facility] OR    LASER CO2 VAGINA N/A 6/8/2021    Procedure: Exam under anesthesia, laser ablation of the upper vagina;  Surgeon: Dany Perez MD;  Location: [facility] OR     Current Outpatient Medications   Medication Sig Dispense Refill    acetaminophen (TYLENOL) 500 MG tablet Take 1,000 mg by mouth      albuterol (PROAIR HFA/PROVENTIL HFA/VENTOLIN HFA) 108 (90 Base) MCG/ACT inhaler Inhale 1-2 puffs into the lungs as needed      amLODIPine (NORVASC) 5 MG tablet Take 10 mg by mouth daily      atenolol (TENORMIN) 100 MG tablet TAKE 1 TABLET(100 MG) BY MOUTH DAILY FOR HIGH BLOOD PRESSURE      benztropine (COGENTIN) 1 MG tablet Take 1 tablet by mouth daily      calcium Citrate-vitamin D 500-400 MG-UNIT CHEW Take 1 tablet by mouth      childrens multivitamin w/iron (FLINTSTONES COMPLETE) 60 MG chewable tablet Take 2 tablets by mouth      cholecalciferol 125 MCG (5000 UT) CAPS Take 5000 mg 4 times a week      diclofenac (VOLTAREN) 1 % topical gel Apply to: Skin on  Both/All Knee for  pain. Topical 1% gel, 4 g applied TOPICALLY to lower extremities 3 times daily PRN ;      docusate sodium (COLACE) 100 MG capsule Take 100 mg by mouth      estradiol (ESTRACE) 0.1 MG/GM vaginal cream Place vaginally twice a week 42.5 g 3    ferrous sulfate (CASSANDRA-IN-SOL) 75 (15 FE) MG/ML oral drops Take 15 mg by mouth daily      furosemide (LASIX) 40 MG tablet Take 40 mg by mouth daily      hydrochlorothiazide (HYDRODIURIL) 25 MG tablet Take 25 mg by mouth      lidocaine (XYLOCAINE) 5 % external ointment Apply 1 Application topically as needed      liraglutide - Weight Management (SAXENDA) 18 MG/3ML pen Inject 2.4 mg Subcutaneous      losartan (COZAAR) 50 MG tablet Take 50 mg by mouth daily      NARCAN 4 MG/0.1ML nasal spray TK UTD PER PACKAGE INSERT  0    oxyCODONE IR (ROXICODONE) 10 MG tablet Take 1 tablet (10 mg) by mouth 3 times daily as needed Do not take while taking Vicodin.      phenazopyridine (PYRIDIUM) 100 MG tablet Take 1 tablet (100 mg) by mouth 3 times daily as needed for urinary tract discomfort 90 tablet 3    QUEtiapine (SEROQUEL) 400 MG tablet Take 400 mg by mouth      sertraline (ZOLOFT) 25 MG tablet Take 1 tablet by mouth daily.      tolterodine ER (DETROL LA) 4 MG 24 hr capsule Take 1 capsule (4 mg) by mouth daily 90 capsule 0    triamcinolone (KENALOG) 0.1 % external ointment Apply a pea sized amount to the vulva and vaginal opening as needed for irritation. May mix with nystatin, but do not use more than 1x daily 30 g 3    zolpidem (AMBIEN) 10 MG tablet Take 10 mg by mouth         Allergies   Allergen Reactions    Ibuprofen Nausea and Vomiting    Shrimp     Sulfa Antibiotics Rash        Social History     Tobacco Use    Smoking status: Never    Smokeless tobacco: Never   Substance Use Topics    Alcohol use: Yes     Comment: Rare     Denies family history of post-operative complications or anesthesia complications  History   Drug Use No             Objective    BP (!) 142/85 (BP Location: Right  "arm, Patient Position: Chair, Cuff Size: Adult Regular)   Pulse 92   Ht 1.522 m (4' 11.92\")   Wt 74.8 kg (165 lb)   SpO2 99%   BMI 32.31 kg/m     Estimated body mass index is 29.76 kg/m  as calculated from the following:    Height as of 2/21/23: 1.6 m (5' 3\").    Weight as of 4/23/24: 76.2 kg (168 lb).  Physical Exam  CONSTITUTIONAL: Alert non-toxic appearing female in no acute distress  HEAD: Normocephalic, atraumatic  EYES: PERRLA; no scleral icterus  ENT: Oropharynx pink without lesions  NECK: Neck supple without lymphadenopathy  RESPIRATORY: Lungs clear to auscultation, respirations unlabored  CV: Irregularly irregular rhythm, no clicks, murmurs, rubs, or gallops; bilateral lower extremities with 1-2+ pitting edema, equal bilaterally  GASTROINTESTINAL: Normoactive bowel sounds x4 quadrants, abdomen soft, non-distended- slightly tender to palpation in bilateral lower quadrants  LYMPHATIC: Cervical, supraclavicular without lymphadenopathy  MUSCULOSKELETAL: Moves all extremities, no obvious muscle wasting  NEUROLOGIC: No gross deficits, antalgic gait, using cane  SKIN: Appropriate color for race, warm and dry, no rashes or lesions to unclothed skin  PSYCHIATRIC: Pleasant and interactive, affect bright, makes appropriate eye contact, thought process linear        Diagnostics  Recent Results (from the past 24 hour(s))   Basic metabolic panel    Collection Time: 05/15/24  7:33 AM   Result Value Ref Range    Sodium 141 135 - 145 mmol/L    Potassium 3.8 3.4 - 5.3 mmol/L    Chloride 105 98 - 107 mmol/L    Carbon Dioxide (CO2) 25 22 - 29 mmol/L    Anion Gap 11 7 - 15 mmol/L    Urea Nitrogen 16.6 8.0 - 23.0 mg/dL    Creatinine 1.24 (H) 0.51 - 0.95 mg/dL    GFR Estimate 47 (L) >60 mL/min/1.73m2    Calcium 9.0 8.8 - 10.2 mg/dL    Glucose 111 (H) 70 - 99 mg/dL   CBC with platelets and differential    Collection Time: 05/15/24  7:33 AM   Result Value Ref Range    WBC Count 10.2 4.0 - 11.0 10e3/uL    RBC Count 3.83 3.80 - " 5.20 10e6/uL    Hemoglobin 7.9 (LL) 11.7 - 15.7 g/dL    Hematocrit 27.7 (L) 35.0 - 47.0 %    MCV 72 (L) 78 - 100 fL    MCH 20.6 (L) 26.5 - 33.0 pg    MCHC 28.5 (L) 31.5 - 36.5 g/dL    RDW 19.9 (H) 10.0 - 15.0 %    Platelet Count 455 (H) 150 - 450 10e3/uL    % Neutrophils 66 %    % Lymphocytes 25 %    % Monocytes 7 %    % Eosinophils 1 %    % Basophils 1 %    % Immature Granulocytes 1 %    NRBCs per 100 WBC 0 <1 /100    Absolute Neutrophils 6.7 1.6 - 8.3 10e3/uL    Absolute Lymphocytes 2.6 0.8 - 5.3 10e3/uL    Absolute Monocytes 0.7 0.0 - 1.3 10e3/uL    Absolute Eosinophils 0.1 0.0 - 0.7 10e3/uL    Absolute Basophils 0.1 0.0 - 0.2 10e3/uL    Absolute Immature Granulocytes 0.1 <=0.4 10e3/uL    Absolute NRBCs 0.0 10e3/uL   Magnesium    Collection Time: 05/15/24  7:33 AM   Result Value Ref Range    Magnesium 2.0 1.7 - 2.3 mg/dL      EKG required for per request of Dr. Perez and not completed in the last 90 days.   EKG: atrial fibrillation, rate controlled    Revised Cardiac Risk Index (RCRI)  The patient has the following serious cardiovascular risks for perioperative complications:   - No serious cardiac risks = 0 points   - 0 points per RCRI calculator- however, given concern for new atrial fibrillation, referred to cardiology  RCRI Interpretation: 0 points: Class I (very low risk - 0.4% complication rate)         Signed Electronically by: ERINN Hammond CNP  Copy of this evaluation report is provided to requesting physician.      I discussed the findings and plan with Marcia after the visit, and I agree with her plan and assessment. I did not see the patient on the day of service. If cleared by cardiology, plan to proceed with surgery as planned. Dany Perez MD

## 2024-05-14 NOTE — PATIENT INSTRUCTIONS
We need urgent work up of the irregular heart rhythm  Echocardiogram tomorrow at [place] at 11 AM  We need to get you into the heart doctor urgently- I will let you know these details  I need you to see Dr. Eckert again as soon as possible as well  If you develop ANY lightheadedness, dizziness, palpitations, weakness, chest pain, shortness of breath, confusion, or anything else strange- you would need to go to the ER  We may need to push back the procedure with Dr. Perez, but I want to see if we can get you in with cardiology today or tomorrow before we change this time as you do need the procedure

## 2024-05-15 ENCOUNTER — PRE VISIT (OUTPATIENT)
Dept: CARDIOLOGY | Facility: CLINIC | Age: 69
End: 2024-05-15

## 2024-05-15 ENCOUNTER — LAB (OUTPATIENT)
Dept: INFUSION THERAPY | Facility: CLINIC | Age: 69
End: 2024-05-15
Attending: OBSTETRICS & GYNECOLOGY
Payer: COMMERCIAL

## 2024-05-15 ENCOUNTER — TELEPHONE (OUTPATIENT)
Dept: CARDIOLOGY | Facility: CLINIC | Age: 69
End: 2024-05-15

## 2024-05-15 ENCOUNTER — ONCOLOGY VISIT (OUTPATIENT)
Dept: ONCOLOGY | Facility: CLINIC | Age: 69
End: 2024-05-15
Attending: OBSTETRICS & GYNECOLOGY
Payer: COMMERCIAL

## 2024-05-15 ENCOUNTER — OFFICE VISIT (OUTPATIENT)
Dept: CARDIOLOGY | Facility: CLINIC | Age: 69
End: 2024-05-15
Attending: INTERNAL MEDICINE
Payer: COMMERCIAL

## 2024-05-15 VITALS
HEART RATE: 92 BPM | HEIGHT: 60 IN | BODY MASS INDEX: 32.39 KG/M2 | WEIGHT: 165 LBS | SYSTOLIC BLOOD PRESSURE: 142 MMHG | DIASTOLIC BLOOD PRESSURE: 85 MMHG | OXYGEN SATURATION: 99 %

## 2024-05-15 VITALS
BODY MASS INDEX: 32.52 KG/M2 | HEART RATE: 75 BPM | WEIGHT: 166.1 LBS | DIASTOLIC BLOOD PRESSURE: 101 MMHG | OXYGEN SATURATION: 100 % | SYSTOLIC BLOOD PRESSURE: 143 MMHG

## 2024-05-15 DIAGNOSIS — D75.839 THROMBOCYTOSIS: ICD-10-CM

## 2024-05-15 DIAGNOSIS — I48.91 ATRIAL FIBRILLATION, UNSPECIFIED TYPE (H): ICD-10-CM

## 2024-05-15 DIAGNOSIS — Z01.818 PREOP GENERAL PHYSICAL EXAM: Primary | ICD-10-CM

## 2024-05-15 DIAGNOSIS — N18.31 STAGE 3A CHRONIC KIDNEY DISEASE (H): Primary | ICD-10-CM

## 2024-05-15 DIAGNOSIS — D63.1 ANEMIA DUE TO STAGE 3A CHRONIC KIDNEY DISEASE (H): ICD-10-CM

## 2024-05-15 DIAGNOSIS — D50.9 IRON DEFICIENCY ANEMIA, UNSPECIFIED IRON DEFICIENCY ANEMIA TYPE: Chronic | ICD-10-CM

## 2024-05-15 DIAGNOSIS — I48.0 PAROXYSMAL ATRIAL FIBRILLATION (H): ICD-10-CM

## 2024-05-15 DIAGNOSIS — N39.498 OTHER URINARY INCONTINENCE: ICD-10-CM

## 2024-05-15 DIAGNOSIS — N85.8 UTERINE MASS: ICD-10-CM

## 2024-05-15 DIAGNOSIS — Z85.41 HISTORY OF MALIGNANT NEOPLASM OF CERVIX: ICD-10-CM

## 2024-05-15 DIAGNOSIS — N18.31 ANEMIA DUE TO STAGE 3A CHRONIC KIDNEY DISEASE (H): ICD-10-CM

## 2024-05-15 DIAGNOSIS — Z01.810 PRE-OPERATIVE CARDIOVASCULAR EXAMINATION: ICD-10-CM

## 2024-05-15 DIAGNOSIS — D07.2 VAIN III (VAGINAL INTRAEPITHELIAL NEOPLASIA III): ICD-10-CM

## 2024-05-15 DIAGNOSIS — N13.30 HYDROURETERONEPHROSIS: ICD-10-CM

## 2024-05-15 DIAGNOSIS — I10 BENIGN ESSENTIAL HYPERTENSION: ICD-10-CM

## 2024-05-15 PROBLEM — N18.9 ANEMIA DUE TO CHRONIC KIDNEY DISEASE: Status: ACTIVE | Noted: 2024-05-15

## 2024-05-15 PROBLEM — F41.1 GENERALIZED ANXIETY DISORDER: Status: ACTIVE | Noted: 2017-05-24

## 2024-05-15 PROBLEM — E53.8 VITAMIN B12 DEFICIENCY: Status: ACTIVE | Noted: 2018-01-09

## 2024-05-15 LAB
ANION GAP SERPL CALCULATED.3IONS-SCNC: 11 MMOL/L (ref 7–15)
BASOPHILS # BLD AUTO: 0.1 10E3/UL (ref 0–0.2)
BASOPHILS NFR BLD AUTO: 1 %
BUN SERPL-MCNC: 16.6 MG/DL (ref 8–23)
CALCIUM SERPL-MCNC: 9 MG/DL (ref 8.8–10.2)
CHLORIDE SERPL-SCNC: 105 MMOL/L (ref 98–107)
CREAT SERPL-MCNC: 1.24 MG/DL (ref 0.51–0.95)
DEPRECATED HCO3 PLAS-SCNC: 25 MMOL/L (ref 22–29)
EGFRCR SERPLBLD CKD-EPI 2021: 47 ML/MIN/1.73M2
EOSINOPHIL # BLD AUTO: 0.1 10E3/UL (ref 0–0.7)
EOSINOPHIL NFR BLD AUTO: 1 %
ERYTHROCYTE [DISTWIDTH] IN BLOOD BY AUTOMATED COUNT: 19.9 % (ref 10–15)
GLUCOSE SERPL-MCNC: 111 MG/DL (ref 70–99)
HCT VFR BLD AUTO: 27.7 % (ref 35–47)
HGB BLD-MCNC: 7.9 G/DL (ref 11.7–15.7)
IMM GRANULOCYTES # BLD: 0.1 10E3/UL
IMM GRANULOCYTES NFR BLD: 1 %
LYMPHOCYTES # BLD AUTO: 2.6 10E3/UL (ref 0.8–5.3)
LYMPHOCYTES NFR BLD AUTO: 25 %
MAGNESIUM SERPL-MCNC: 2 MG/DL (ref 1.7–2.3)
MCH RBC QN AUTO: 20.6 PG (ref 26.5–33)
MCHC RBC AUTO-ENTMCNC: 28.5 G/DL (ref 31.5–36.5)
MCV RBC AUTO: 72 FL (ref 78–100)
MONOCYTES # BLD AUTO: 0.7 10E3/UL (ref 0–1.3)
MONOCYTES NFR BLD AUTO: 7 %
NEUTROPHILS # BLD AUTO: 6.7 10E3/UL (ref 1.6–8.3)
NEUTROPHILS NFR BLD AUTO: 66 %
NRBC # BLD AUTO: 0 10E3/UL
NRBC BLD AUTO-RTO: 0 /100
PLATELET # BLD AUTO: 455 10E3/UL (ref 150–450)
POTASSIUM SERPL-SCNC: 3.8 MMOL/L (ref 3.4–5.3)
RBC # BLD AUTO: 3.83 10E6/UL (ref 3.8–5.2)
SODIUM SERPL-SCNC: 141 MMOL/L (ref 135–145)
WBC # BLD AUTO: 10.2 10E3/UL (ref 4–11)

## 2024-05-15 PROCEDURE — 99215 OFFICE O/P EST HI 40 MIN: CPT | Performed by: NURSE PRACTITIONER

## 2024-05-15 PROCEDURE — 82374 ASSAY BLOOD CARBON DIOXIDE: CPT

## 2024-05-15 PROCEDURE — G0463 HOSPITAL OUTPT CLINIC VISIT: HCPCS | Mod: 27 | Performed by: INTERNAL MEDICINE

## 2024-05-15 PROCEDURE — G0463 HOSPITAL OUTPT CLINIC VISIT: HCPCS | Performed by: NURSE PRACTITIONER

## 2024-05-15 PROCEDURE — 83735 ASSAY OF MAGNESIUM: CPT

## 2024-05-15 PROCEDURE — 85025 COMPLETE CBC W/AUTO DIFF WBC: CPT

## 2024-05-15 PROCEDURE — 93000 ELECTROCARDIOGRAM COMPLETE: CPT | Performed by: NURSE PRACTITIONER

## 2024-05-15 PROCEDURE — 99417 PROLNG OP E/M EACH 15 MIN: CPT | Performed by: NURSE PRACTITIONER

## 2024-05-15 PROCEDURE — 36415 COLL VENOUS BLD VENIPUNCTURE: CPT

## 2024-05-15 PROCEDURE — 82565 ASSAY OF CREATININE: CPT

## 2024-05-15 PROCEDURE — 99204 OFFICE O/P NEW MOD 45 MIN: CPT | Performed by: INTERNAL MEDICINE

## 2024-05-15 RX ORDER — LOSARTAN POTASSIUM 50 MG/1
50 TABLET ORAL DAILY
Status: ON HOLD | COMMUNITY
Start: 2023-04-13 | End: 2024-07-16

## 2024-05-15 RX ORDER — QUETIAPINE FUMARATE 400 MG/1
400 TABLET, FILM COATED ORAL AT BEDTIME
Status: ON HOLD | COMMUNITY
Start: 2024-04-29 | End: 2024-08-09

## 2024-05-15 RX ORDER — LIRAGLUTIDE 6 MG/ML
2.4 INJECTION, SOLUTION SUBCUTANEOUS
Status: ON HOLD | COMMUNITY
Start: 2024-05-02 | End: 2024-07-16

## 2024-05-15 RX ORDER — FUROSEMIDE 40 MG
40 TABLET ORAL DAILY
Status: ON HOLD | COMMUNITY
Start: 2024-01-23 | End: 2024-07-16

## 2024-05-15 RX ORDER — FERROUS SULFATE 7.5 MG/0.5
15 SYRINGE (EA) ORAL DAILY
COMMUNITY
Start: 2024-05-07

## 2024-05-15 RX ORDER — ATENOLOL 100 MG/1
TABLET ORAL
Status: ON HOLD | COMMUNITY
Start: 2024-04-19 | End: 2024-08-09

## 2024-05-15 RX ORDER — BENZTROPINE MESYLATE 1 MG/1
1 TABLET ORAL DAILY
Status: ON HOLD | COMMUNITY
Start: 2024-04-29 | End: 2024-08-09

## 2024-05-15 ASSESSMENT — PAIN SCALES - GENERAL
PAINLEVEL: EXTREME PAIN (9)
PAINLEVEL: NO PAIN (0)

## 2024-05-15 NOTE — PROGRESS NOTES
I am delighted to see Alis AMINAH Washington in consultation.The primary encounter diagnosis was Stage 3a chronic kidney disease (H). Diagnoses of Atrial fibrillation, unspecified type (H), Anemia due to stage 3a chronic kidney disease (H), Thrombocytosis, Benign essential hypertension, Pre-operative cardiovascular examination, and Paroxysmal atrial fibrillation (H) were also pertinent to this visit.   As you know, the patient is a 68 year old -American female. She   has a past medical history of Atrial fibrillation (H), Depression, Hypertension, Malignant neoplasm of endocervix (H), Other chronic pain, and Urinary incontinence..    On this visit, the patient states that she has limited exercise tolerance because of bilat knee pain.  The patient denies chest pressure/discomfort, dyspnea, palpitations, near-syncope, syncope, orthopnea, and paroxysmal nocturnal dyspnea.    The patient's cardiovascular risk factors include arrhythmia and hypertension.    The following portions of the patient's history were reviewed and updated as appropriate: allergies, current medications, past family history, past medical history, past social history, past surgical history, and the problem list.    PMH: The patient's past medical history includes:    Past Medical History:   Diagnosis Date    Atrial fibrillation (H)     Depression     Hypertension     Malignant neoplasm of endocervix (H)     Tx with radiation    Other chronic pain     Low back    Urinary incontinence       Past Surgical History:   Procedure Laterality Date    ABDOMINOPLASTY      BIOPSY CERVICAL, LOCAL EXCISION, SINGLE/MULTIPLE  10/26/2011    Procedure:BIOPSY CERVICAL, LOCAL EXCISION, SINGLE/MULTIPLE; EUA, cervical biopsies; Surgeon:BETTY TINEO; Location:[facility] OR    BIOPSY VAGINAL N/A 6/8/2021    Procedure: BIOPSY, VAGINA;  Surgeon: Dany Perez MD;  Location: [facility] OR    EXAM UNDER ANESTHESIA PELVIC N/A 3/12/2020    Procedure: Exam under anesthsia, vaginal  biopsies, possible CO2 laser of the vagina;  Surgeon: Lilliam Roy MD;  Location: [facility]    GI SURGERY  2004    gastric bypass    LASER CO2 VAGINA N/A 3/2/2015    Procedure: LASER CO2 VAGINA;  Surgeon: Mariela Abdalla MD;  Location: [facility] OR    LASER CO2 VAGINA N/A 5/24/2019    Procedure: Exam under anesthesia, vaginal biopsies, CO2 laser of the vagina;  Surgeon: Lilliam Roy MD;  Location: [facility] OR    LASER CO2 VAGINA N/A 6/8/2021    Procedure: Exam under anesthesia, laser ablation of the upper vagina;  Surgeon: Dany Perez MD;  Location: [facility] OR       The patient's medications as of the current encounter are:     Current Outpatient Medications   Medication Sig Dispense Refill    acetaminophen (TYLENOL) 500 MG tablet Take 1,000 mg by mouth      albuterol (PROAIR HFA/PROVENTIL HFA/VENTOLIN HFA) 108 (90 Base) MCG/ACT inhaler Inhale 1-2 puffs into the lungs as needed      amLODIPine (NORVASC) 5 MG tablet Take 10 mg by mouth daily      apixaban ANTICOAGULANT (ELIQUIS) 5 MG tablet Take 1 tablet (5 mg) by mouth 2 times daily 60 tablet 2    atenolol (TENORMIN) 100 MG tablet TAKE 1 TABLET(100 MG) BY MOUTH DAILY FOR HIGH BLOOD PRESSURE      benztropine (COGENTIN) 1 MG tablet Take 1 tablet by mouth daily      calcium Citrate-vitamin D 500-400 MG-UNIT CHEW Take 1 tablet by mouth      childrens multivitamin w/iron (FLINTSTONES COMPLETE) 60 MG chewable tablet Take 2 tablets by mouth      cholecalciferol 125 MCG (5000 UT) CAPS Take 5000 mg 4 times a week      diclofenac (VOLTAREN) 1 % topical gel Apply to: Skin on  Both/All Knee for pain. Topical 1% gel, 4 g applied TOPICALLY to lower extremities 3 times daily PRN ;      docusate sodium (COLACE) 100 MG capsule Take 100 mg by mouth      estradiol (ESTRACE) 0.1 MG/GM vaginal cream Place vaginally twice a week 42.5 g 3    ferrous sulfate (CASSANDRA-IN-SOL) 75 (15 FE) MG/ML oral drops Take 15 mg by mouth daily      furosemide (LASIX) 40 MG tablet Take 40 mg by  mouth daily      hydrochlorothiazide (HYDRODIURIL) 25 MG tablet Take 25 mg by mouth      lidocaine (XYLOCAINE) 5 % external ointment Apply 1 Application topically as needed      liraglutide - Weight Management (SAXENDA) 18 MG/3ML pen Inject 2.4 mg Subcutaneous      losartan (COZAAR) 50 MG tablet Take 50 mg by mouth daily      NARCAN 4 MG/0.1ML nasal spray TK UTD PER PACKAGE INSERT  0    oxyCODONE IR (ROXICODONE) 10 MG tablet Take 1 tablet (10 mg) by mouth 3 times daily as needed Do not take while taking Vicodin.      phenazopyridine (PYRIDIUM) 100 MG tablet Take 1 tablet (100 mg) by mouth 3 times daily as needed for urinary tract discomfort 90 tablet 3    QUEtiapine (SEROQUEL) 400 MG tablet Take 400 mg by mouth      sertraline (ZOLOFT) 25 MG tablet Take 1 tablet by mouth daily.      tolterodine ER (DETROL LA) 4 MG 24 hr capsule Take 1 capsule (4 mg) by mouth daily 90 capsule 0    triamcinolone (KENALOG) 0.1 % external ointment Apply a pea sized amount to the vulva and vaginal opening as needed for irritation. May mix with nystatin, but do not use more than 1x daily 30 g 3    zolpidem (AMBIEN) 10 MG tablet Take 10 mg by mouth         Labs:     Lab on 05/15/2024   Component Date Value Ref Range Status    Sodium 05/15/2024 141  135 - 145 mmol/L Final    Reference intervals for this test were updated on 09/26/2023 to more accurately reflect our healthy population. There may be differences in the flagging of prior results with similar values performed with this method. Interpretation of those prior results can be made in the context of the updated reference intervals.     Potassium 05/15/2024 3.8  3.4 - 5.3 mmol/L Final    Chloride 05/15/2024 105  98 - 107 mmol/L Final    Carbon Dioxide (CO2) 05/15/2024 25  22 - 29 mmol/L Final    Anion Gap 05/15/2024 11  7 - 15 mmol/L Final    Urea Nitrogen 05/15/2024 16.6  8.0 - 23.0 mg/dL Final    Creatinine 05/15/2024 1.24 (H)  0.51 - 0.95 mg/dL Final    GFR Estimate 05/15/2024 47  (L)  >60 mL/min/1.73m2 Final    Calcium 05/15/2024 9.0  8.8 - 10.2 mg/dL Final    Glucose 05/15/2024 111 (H)  70 - 99 mg/dL Final    WBC Count 05/15/2024 10.2  4.0 - 11.0 10e3/uL Final    RBC Count 05/15/2024 3.83  3.80 - 5.20 10e6/uL Final    Hemoglobin 05/15/2024 7.9 (LL)  11.7 - 15.7 g/dL Final    Hematocrit 05/15/2024 27.7 (L)  35.0 - 47.0 % Final    MCV 05/15/2024 72 (L)  78 - 100 fL Final    MCH 05/15/2024 20.6 (L)  26.5 - 33.0 pg Final    MCHC 05/15/2024 28.5 (L)  31.5 - 36.5 g/dL Final    RDW 05/15/2024 19.9 (H)  10.0 - 15.0 % Final    Platelet Count 05/15/2024 455 (H)  150 - 450 10e3/uL Final    % Neutrophils 05/15/2024 66  % Final    % Lymphocytes 05/15/2024 25  % Final    % Monocytes 05/15/2024 7  % Final    % Eosinophils 05/15/2024 1  % Final    % Basophils 05/15/2024 1  % Final    % Immature Granulocytes 05/15/2024 1  % Final    NRBCs per 100 WBC 05/15/2024 0  <1 /100 Final    Absolute Neutrophils 05/15/2024 6.7  1.6 - 8.3 10e3/uL Final    Absolute Lymphocytes 05/15/2024 2.6  0.8 - 5.3 10e3/uL Final    Absolute Monocytes 05/15/2024 0.7  0.0 - 1.3 10e3/uL Final    Absolute Eosinophils 05/15/2024 0.1  0.0 - 0.7 10e3/uL Final    Absolute Basophils 05/15/2024 0.1  0.0 - 0.2 10e3/uL Final    Absolute Immature Granulocytes 05/15/2024 0.1  <=0.4 10e3/uL Final    Absolute NRBCs 05/15/2024 0.0  10e3/uL Final    Magnesium 05/15/2024 2.0  1.7 - 2.3 mg/dL Final       Allergies:    Allergies   Allergen Reactions    Ibuprofen Nausea and Vomiting    Shrimp     Sulfa Antibiotics Rash       Family History:   Family History   Problem Relation Age of Onset    Heart Disease Father     Heart Failure Father     No Known Problems Brother     No Known Problems Brother     No Known Problems Brother     Pancreatitis Brother     Hypertension Sister     Peripheral Vascular Disease Sister     No Known Problems Sister     No Known Problems Son     No Known Problems Daughter        Psychosocial history:  reports that she has never  smoked. She has never used smokeless tobacco. She reports that she does not currently use alcohol. She reports that she does not use drugs.    Review of systems: negative for, palpitations, exertional chest pain or pressure, paroxysmal nocturnal dyspnea, dyspnea on exertion, orthopnea, and syncope or near-syncope    In addition,   General: No change in weight, sleep or appetite.  Normal energy.  No fever or chills  Eyes: Negative for vision changes or eye problems  ENT: No problems with ears, nose or throat.  No difficulty swallowing.  Resp: No coughing, wheezing or shortness of breath  GI: No nausea, vomiting,  heartburn, abdominal pain, diarrhea, constipation or change in bowel habits  : No urinary frequency or dysuria, bladder or kidney problems  Musculoskeletal: bilat knee pain  Neurologic: No headaches, numbness, tingling, weakness, problems with balance or coordination  Psychiatric: stable axis 1 disorder  Heme/immune/allergy: anemia  Endocrine: No history of thyroid disease, diabetes or other endocrine disorders  Skin: No rashes,worrisome lesions or skin problems  Vascular:  No claudication, lifestyle limiting or otherwise; no ischemic rest pain; no non-healing ulcers. No weakness, No loss of sensation    Cervical Ca    Physical examination  Vitals: BP (!) 143/101 (BP Location: Left arm, Patient Position: Chair, Cuff Size: Adult Regular)   Pulse 75   Wt 75.3 kg (166 lb 1.6 oz)   SpO2 100%   BMI 32.52 kg/m    BMI= Body mass index is 32.52 kg/m .    In general, the patient is a pleasant female in no apparent distress.    HEENT: Normiocephalic and atraumatic.  PERRLA.  EOMI.  Sclerae white, not injected.  Nares clear.  Pharynx without erythema or exudate.  Dentition intact.    Neck: No adenopathy.  No thyromegaly. Carotids +2/2 bilaterally without bruits.  No jugular venous distension.   Heart:  The PMI is in the 5th ICS in the midclavicular line. There is no heave. Regular rate and rhythm. Normal S1, S2  splits physiologically. No murmur, rub, click, or gallop.    Lungs: Clear to asculation.  No ronchi, wheezes, rales.  No dullness to percussion.   Abdomen: Soft, nontender, nondistended. No organomegaly. No AAA.  No bruits.   Extremities: mild edema bilat. The pulses were intact bilaterally.   Neurological: The neurological examination reveal a patient who was oriented to person, place, and time.  The remainder of the examination was nonfocal.    Cardiac tests include:    5/15/24 - atrial fib  bpm    Assessment and Plan    Preop - check lexiscan  PAF - start DOAC  - check echo  - may hold DOAC for surgery  - now in sinus  3. HTN - atenolol, amlodipine, hydrochlorothiazide, losartan  - patient says her BP is controlled at home    The patient is to return  in 3 months. The patient understood the treatment plan as outlined above.  There were no barriers to learning.      Robin Herrera MD

## 2024-05-15 NOTE — TELEPHONE ENCOUNTER
RECORDS RECEIVED FROM:    DATE RECEIVED:    NOTES STATUS DETAILS   OFFICE NOTE from referring provider  Internal 05/15/24 - Marcia Painting CNP [facility]    OFFICE NOTE from other cardiologists  Internal    MEDICATION LIST Internal    GENERAL CARDIO RECORDS   (ALL APPOINTMENT TYPES)     LABS (CBC,BMP,CMP, TSH) Internal    EKG (STRIPS & REPORTS) In process 05/15/24   ECHOS (IMAGES AND REPORTS) In process 05/15/24   CT/CTA (IMAGES AND REPORTS) Internal 05/03/24

## 2024-05-15 NOTE — TELEPHONE ENCOUNTER
5/15 Patient confirmed scheduled appointment:  Date: 5/15/2024  Time: 3:00 pm  Visit type: New Cardiology  Provider: Javier   [place] [facility] [place]imaging: [place]/A  Additional notes: Cardiac Clearance

## 2024-05-15 NOTE — PATIENT INSTRUCTIONS
Complete an echocardiogram (ultrasound of heart)    Complete a Lexiscan (see below)    Begin apixaban - RX sent to pharmacy    Follow up in 3 months with Nurse Practitioner    Lexiscan (nuclear stress test)     Why do I need this test?  Your doctor has ordered a nuclear stress test to check how well blood is flowing through your heart. You will either exercise or take a medicine that mimics exercise; we will watch your heart. Please allow 2 to 4 hours for this test.      What happens during this test?      1. We will place an IV (small needle) in the vein of your arm or hand.  2. We will inject a liquid through the IV. The liquid contains radioactivity. This helps your heart show up better on the pictures we will take.  3. About 30 to 60 minutes later, you will lie down on a table. A special camera will rotate around your chest taking pictures of your heart. This lasts less than 30 minutes.  4. To prepare for the stress test, we will check your blood pressure. We will also attach small pads to your chest. The pads are hooked to an EKG (electrocardiogram) machine. The machine shows how your heart is working during the test.  5. You will begin the stress test.    If you can exercise, you will walk on a treadmill for 5 to 15 minutes. You will start at a slow speed. We will slowly increase the speed and level of incline.    If you cannot exercise, we will give you medicine to mimic the effects of exercise. The medicine goes through the IV slowly.  6. During the stress test, we will again inject liquid through your IV. (See Step 2.)  7. After the stress test, you will wait 30 to 60 minutes. We will then take more pictures of your heart.    Preparation :    NO FOOD 3 hours prior, Water is ok and encouraged  NO alcohol, smoking, caffeine, or decaf products 12 hours prior    TAKE ALL medications as prescribed, hold the following medications if they pertain to you:  If you take Aggrenox or dipyridamole (Persantine, Permole),  stop taking it 48 hours before your test.  If you take Viagra, Cialis or Levitra, stop taking it 48 hours before your test.  If you take theophylline or aminophylline, stop taking it 12 hours before your test.  For patients with diabetes:If you take insulin, call your diabetes care team. Ask if you should take a 1/2 dose the morning of your test  If you take diabetes medicine by mouth, don't take it on the morning of your test. Bring it with you to take after the test. (If you have questions, call your diabetes care team.)  Do not take nitrates on the day of your test. Do not wear a Nitro-Patch.

## 2024-05-15 NOTE — PROGRESS NOTES
DATE/TIME OF CALL RECEIVED FROM LAB:  05/15/24 at 7:52 AM   LAB TEST:  Hgb  LAB VALUE:  7.9  PROVIDER NOTIFIED?: Yes  PROVIDER NAME: Marcia Painting NP  DATE/TIME LAB VALUE REPORTED TO PROVIDER: 0753  MECHANISM OF PROVIDER NOTIFICATION: Face-To-Face  PROVIDER RESPONSE: Patient is being seen by provider in clinic today.     Monika Gaston RN-BSN, PHN, OCN  [facility]

## 2024-05-15 NOTE — PROGRESS NOTES
Aranza was on incorrect lab schedule. Adrianne, lab phlebotomist, collected cbc, bmp via venipuncture.    Veronica Payton RN

## 2024-05-15 NOTE — NURSING NOTE
Chief Complaint   Patient presents with    New Patient     new - referral from oncology - [facility]       Vitals were taken, medications reconciled.    Paige Thurner, Facilitator   3:02 PM

## 2024-05-15 NOTE — LETTER
5/15/2024      RE: Alis Hartman  [address]  [facility] [address]       Dear Colleague,    Thank you for the opportunity to participate in the care of your patient, Alis Hartman, at the [facility] [place] at [facility]. Please see a copy of my visit note below.    I am delighted to see Alis Hartman in consultation.The primary encounter diagnosis was Stage 3a chronic kidney disease (H). Diagnoses of Atrial fibrillation, unspecified type (H), Anemia due to stage 3a chronic kidney disease (H), Thrombocytosis, Benign essential hypertension, Pre-operative cardiovascular examination, and Paroxysmal atrial fibrillation (H) were also pertinent to this visit.   As you know, the patient is a 68 year old -American female. She   has a past medical history of Atrial fibrillation (H), Depression, Hypertension, Malignant neoplasm of endocervix (H), Other chronic pain, and Urinary incontinence..    On this visit, the patient states that she has limited exercise tolerance because of bilat knee pain.  The patient denies chest pressure/discomfort, dyspnea, palpitations, near-syncope, syncope, orthopnea, and paroxysmal nocturnal dyspnea.    The patient's cardiovascular risk factors include arrhythmia and hypertension.    The following portions of the patient's history were reviewed and updated as appropriate: allergies, current medications, past family history, past medical history, past social history, past surgical history, and the problem list.    PMH: The patient's past medical history includes:    Past Medical History:   Diagnosis Date     Atrial fibrillation (H)      Depression      Hypertension      Malignant neoplasm of endocervix (H)     Tx with radiation     Other chronic pain     Low back     Urinary incontinence       Past Surgical History:   Procedure Laterality Date     ABDOMINOPLASTY       BIOPSY CERVICAL, LOCAL  EXCISION, SINGLE/MULTIPLE  10/26/2011    Procedure:BIOPSY CERVICAL, LOCAL EXCISION, SINGLE/MULTIPLE; EUA, cervical biopsies; Surgeon:BETTY TINEO; Location:[facility] OR     BIOPSY VAGINAL N/A 6/8/2021    Procedure: BIOPSY, VAGINA;  Surgeon: Dany Perez MD;  Location: [facility] OR     EXAM UNDER ANESTHESIA PELVIC N/A 3/12/2020    Procedure: Exam under anesthsia, vaginal biopsies, possible CO2 laser of the vagina;  Surgeon: Lilliam Roy MD;  Location: [facility] OR     GI SURGERY  2004    gastric bypass     LASER CO2 VAGINA N/A 3/2/2015    Procedure: LASER CO2 VAGINA;  Surgeon: Mariela Abdalla MD;  Location: [facility] OR     LASER CO2 VAGINA N/A 5/24/2019    Procedure: Exam under anesthesia, vaginal biopsies, CO2 laser of the vagina;  Surgeon: Lilliam Roy MD;  Location: [facility] OR     LASER CO2 VAGINA N/A 6/8/2021    Procedure: Exam under anesthesia, laser ablation of the upper vagina;  Surgeon: Dany Perez MD;  Location: [facility] OR       The patient's medications as of the current encounter are:     Current Outpatient Medications   Medication Sig Dispense Refill     acetaminophen (TYLENOL) 500 MG tablet Take 1,000 mg by mouth       albuterol (PROAIR HFA/PROVENTIL HFA/VENTOLIN HFA) 108 (90 Base) MCG/ACT inhaler Inhale 1-2 puffs into the lungs as needed       amLODIPine (NORVASC) 5 MG tablet Take 10 mg by mouth daily       apixaban ANTICOAGULANT (ELIQUIS) 5 MG tablet Take 1 tablet (5 mg) by mouth 2 times daily 60 tablet 2     atenolol (TENORMIN) 100 MG tablet TAKE 1 TABLET(100 MG) BY MOUTH DAILY FOR HIGH BLOOD PRESSURE       benztropine (COGENTIN) 1 MG tablet Take 1 tablet by mouth daily       calcium Citrate-vitamin D 500-400 MG-UNIT CHEW Take 1 tablet by mouth       childrens multivitamin w/iron (FLINTSTONES COMPLETE) 60 MG chewable tablet Take 2 tablets by mouth       cholecalciferol 125 MCG (5000 UT) CAPS Take 5000 mg 4 times a week       diclofenac (VOLTAREN) 1 % topical gel Apply to: Skin on  Both/All Knee  for pain. Topical 1% gel, 4 g applied TOPICALLY to lower extremities 3 times daily PRN ;       docusate sodium (COLACE) 100 MG capsule Take 100 mg by mouth       estradiol (ESTRACE) 0.1 MG/GM vaginal cream Place vaginally twice a week 42.5 g 3     ferrous sulfate (CASSANDRA-IN-SOL) 75 (15 FE) MG/ML oral drops Take 15 mg by mouth daily       furosemide (LASIX) 40 MG tablet Take 40 mg by mouth daily       hydrochlorothiazide (HYDRODIURIL) 25 MG tablet Take 25 mg by mouth       lidocaine (XYLOCAINE) 5 % external ointment Apply 1 Application topically as needed       liraglutide - Weight Management (SAXENDA) 18 MG/3ML pen Inject 2.4 mg Subcutaneous       losartan (COZAAR) 50 MG tablet Take 50 mg by mouth daily       NARCAN 4 MG/0.1ML nasal spray TK UTD PER PACKAGE INSERT  0     oxyCODONE IR (ROXICODONE) 10 MG tablet Take 1 tablet (10 mg) by mouth 3 times daily as needed Do not take while taking Vicodin.       phenazopyridine (PYRIDIUM) 100 MG tablet Take 1 tablet (100 mg) by mouth 3 times daily as needed for urinary tract discomfort 90 tablet 3     QUEtiapine (SEROQUEL) 400 MG tablet Take 400 mg by mouth       sertraline (ZOLOFT) 25 MG tablet Take 1 tablet by mouth daily.       tolterodine ER (DETROL LA) 4 MG 24 hr capsule Take 1 capsule (4 mg) by mouth daily 90 capsule 0     triamcinolone (KENALOG) 0.1 % external ointment Apply a pea sized amount to the vulva and vaginal opening as needed for irritation. May mix with nystatin, but do not use more than 1x daily 30 g 3     zolpidem (AMBIEN) 10 MG tablet Take 10 mg by mouth         Labs:     Lab on 05/15/2024   Component Date Value Ref Range Status     Sodium 05/15/2024 141  135 - 145 mmol/L Final    Reference intervals for this test were updated on 09/26/2023 to more accurately reflect our healthy population. There may be differences in the flagging of prior results with similar values performed with this method. Interpretation of those prior results can be made in the context  of the updated reference intervals.      Potassium 05/15/2024 3.8  3.4 - 5.3 mmol/L Final     Chloride 05/15/2024 105  98 - 107 mmol/L Final     Carbon Dioxide (CO2) 05/15/2024 25  22 - 29 mmol/L Final     Anion Gap 05/15/2024 11  7 - 15 mmol/L Final     Urea Nitrogen 05/15/2024 16.6  8.0 - 23.0 mg/dL Final     Creatinine 05/15/2024 1.24 (H)  0.51 - 0.95 mg/dL Final     GFR Estimate 05/15/2024 47 (L)  >60 mL/min/1.73m2 Final     Calcium 05/15/2024 9.0  8.8 - 10.2 mg/dL Final     Glucose 05/15/2024 111 (H)  70 - 99 mg/dL Final     WBC Count 05/15/2024 10.2  4.0 - 11.0 10e3/uL Final     RBC Count 05/15/2024 3.83  3.80 - 5.20 10e6/uL Final     Hemoglobin 05/15/2024 7.9 (LL)  11.7 - 15.7 g/dL Final     Hematocrit 05/15/2024 27.7 (L)  35.0 - 47.0 % Final     MCV 05/15/2024 72 (L)  78 - 100 fL Final     MCH 05/15/2024 20.6 (L)  26.5 - 33.0 pg Final     MCHC 05/15/2024 28.5 (L)  31.5 - 36.5 g/dL Final     RDW 05/15/2024 19.9 (H)  10.0 - 15.0 % Final     Platelet Count 05/15/2024 455 (H)  150 - 450 10e3/uL Final     % Neutrophils 05/15/2024 66  % Final     % Lymphocytes 05/15/2024 25  % Final     % Monocytes 05/15/2024 7  % Final     % Eosinophils 05/15/2024 1  % Final     % Basophils 05/15/2024 1  % Final     % Immature Granulocytes 05/15/2024 1  % Final     NRBCs per 100 WBC 05/15/2024 0  <1 /100 Final     Absolute Neutrophils 05/15/2024 6.7  1.6 - 8.3 10e3/uL Final     Absolute Lymphocytes 05/15/2024 2.6  0.8 - 5.3 10e3/uL Final     Absolute Monocytes 05/15/2024 0.7  0.0 - 1.3 10e3/uL Final     Absolute Eosinophils 05/15/2024 0.1  0.0 - 0.7 10e3/uL Final     Absolute Basophils 05/15/2024 0.1  0.0 - 0.2 10e3/uL Final     Absolute Immature Granulocytes 05/15/2024 0.1  <=0.4 10e3/uL Final     Absolute NRBCs 05/15/2024 0.0  10e3/uL Final     Magnesium 05/15/2024 2.0  1.7 - 2.3 mg/dL Final       Allergies:    Allergies   Allergen Reactions     Ibuprofen Nausea and Vomiting     Shrimp      Sulfa Antibiotics Rash       Family  History:   Family History   Problem Relation Age of Onset     Heart Disease Father      Heart Failure Father      No Known Problems Brother      No Known Problems Brother      No Known Problems Brother      Pancreatitis Brother      Hypertension Sister      Peripheral Vascular Disease Sister      No Known Problems Sister      No Known Problems Son      No Known Problems Daughter        Psychosocial history:  reports that she has never smoked. She has never used smokeless tobacco. She reports that she does not currently use alcohol. She reports that she does not use drugs.    Review of systems: negative for, palpitations, exertional chest pain or pressure, paroxysmal nocturnal dyspnea, dyspnea on exertion, orthopnea, and syncope or near-syncope    In addition,   General: No change in weight, sleep or appetite.  Normal energy.  No fever or chills  Eyes: Negative for vision changes or eye problems  ENT: No problems with ears, nose or throat.  No difficulty swallowing.  Resp: No coughing, wheezing or shortness of breath  GI: No nausea, vomiting,  heartburn, abdominal pain, diarrhea, constipation or change in bowel habits  : No urinary frequency or dysuria, bladder or kidney problems  Musculoskeletal: bilat knee pain  Neurologic: No headaches, numbness, tingling, weakness, problems with balance or coordination  Psychiatric: stable axis 1 disorder  Heme/immune/allergy: anemia  Endocrine: No history of thyroid disease, diabetes or other endocrine disorders  Skin: No rashes,worrisome lesions or skin problems  Vascular:  No claudication, lifestyle limiting or otherwise; no ischemic rest pain; no non-healing ulcers. No weakness, No loss of sensation    Cervical Ca    Physical examination  Vitals: BP (!) 143/101 (BP Location: Left arm, Patient Position: Chair, Cuff Size: Adult Regular)   Pulse 75   Wt 75.3 kg (166 lb 1.6 oz)   SpO2 100%   BMI 32.52 kg/m    BMI= Body mass index is 32.52 kg/m .    In general, the patient  is a pleasant female in no apparent distress.    HEENT: Normiocephalic and atraumatic.  PERRLA.  EOMI.  Sclerae white, not injected.  Nares clear.  Pharynx without erythema or exudate.  Dentition intact.    Neck: No adenopathy.  No thyromegaly. Carotids +2/2 bilaterally without bruits.  No jugular venous distension.   Heart:  The PMI is in the 5th ICS in the midclavicular line. There is no heave. Regular rate and rhythm. Normal S1, S2 splits physiologically. No murmur, rub, click, or gallop.    Lungs: Clear to asculation.  No ronchi, wheezes, rales.  No dullness to percussion.   Abdomen: Soft, nontender, nondistended. No organomegaly. No AAA.  No bruits.   Extremities: mild edema bilat. The pulses were intact bilaterally.   Neurological: The neurological examination reveal a patient who was oriented to person, place, and time.  The remainder of the examination was nonfocal.    Cardiac tests include:    5/15/24 - atrial fib  bpm    Assessment and Plan    Preop - check lexiscan  PAF - start DOAC  - check echo  - may hold DOAC for surgery  - now in sinus  3. HTN - atenolol, amlodipine, hydrochlorothiazide, losartan  - patient says her BP is controlled at home    The patient is to return  in 3 months. The patient understood the treatment plan as outlined above.  There were no barriers to learning.      Robin Herrera MD

## 2024-05-15 NOTE — LETTER
5/15/2024         RE: Alis Hartman  [address]  [place] [address]        Dear Colleague,    Thank you for referring your patient, Alis Hartman, to the [facility]. Please see a copy of my visit note below.    Preoperative Evaluation  [facility]  [id number] [place] [id number]  Phone: [phone]  Fax: [phone]  Primary Provider: Maria Fernanda Eckert  Pre-op Performing Provider: DAVINA TREVINO  May 15, 2024   Aranza is a 68 year old female presenting for the following: pre-op evaluation       Surgical Information  Surgery/Procedure: D&C with drainage of the uterus under ultrasound guidance; cystoscopy and possible backfill of the bladder with methylene blue to assess for evidence of vesicovaginal fistula   Surgery Location: [facility]  Surgeon: Dany Perez MD  Surgery Date: 5/17/2024  Time of Surgery: 11:40  Where patient plans to recover: At home with family  Fax number for surgical facility: Note does not need to be faxed, will be available electronically in Epic.    Assessment & Plan    The proposed surgical procedure is considered INTERMEDIATE risk.    Preop general physical exam  Uterine mass  Hydroureteronephrosis  History of malignant neoplasm of cervix  VAIN III (vaginal intraepithelial neoplasia III)  Other urinary incontinence  Iron deficiency anemia, unspecified iron deficiency anemia type  Atrial fibrillation, unspecified type (H)    - Magnesium  - Adult Cardiology Eval  Referral  - Echocardiogram Limited    Presenting for pre-op evaluation prior to US guided D&C for uterine distention and cystoscopy- concern for new onset atrial fibrillation and persistent iron deficiency anemia. Will need cardiology evaluation prior to surgery- expediting work up given possibility of recurrent cancer as a source of her uterine distention. Will addend note with formal pre-op  recommendations pending results of cardiology evaluation and echocardiogram.    Atrial fibrillation noted on EKG and exam today- previous EKG from 2019 with NSR, no recent EKGs since then. Reviewed PCP's most recent note from 5/2/24, noted to be regular rate and rhythm on exam, no mention of atrial fibrillation. I do question if this is new or if she has had episodes of paroxysmal atrial fibrillation in the past given previous episodes of dizziness. Magnesium and TSH WNL. Today, she is hemodynamically stable, rate controlled, and asymptomatic. She already is on atenolol for hypertension. Given potential new diagnosis of atrial fibrillation, I cannot safely clear her for surgery until she has had further evaluation by cardiology. Will expedite work up- has echocardiogram scheduled for tomorrow. STAT cardiology referral placed. I have a message out to her PCP as well to discuss further. Given that she is hemodynamically stable and generally feeling well from a cardiovascular perspective, will hold off on ED transfer unless she becomes symptomatic- will try to get her in with her PCP today. Reviewed atrial fibrillation with Aranza, risks (including VTE), and when to seek further care.    Additionally, her hemoglobin is low today at 7.9- this is stable compared to her last draw 5/2/24 at her PCP's office. Unclear etiology, possible related to malabsorption after gastric bypass, but she has never had a colonoscopy. She denies black tarry stools, melena, vaginal bleeding, or hematuria. Question if anemia is contributing to atrial fibrillation. She is taking iron supplement per her PCP, however, she is not taking this daily as ordered. May benefit from 1-2 units PRBCs prior to surgery- will discuss with Dr. Perez.    Regarding the urinary incontinence, she declines a Shahid catheter insertion today due to concerns about discomfort. Reviewed insertion technique, topical lidocaine, etc- she declines despite this and states  that she will continue to use incontinence pads. She does have continuous leakage of urine.    Hope is to ultimately get her to surgery as scheduled on 5/17/24 as she is fairly symptomatic and further evaluation needs to be performed from a cancer perspective. That being said, if unable to complete work up prior to scheduled date, will need to reschedule. Dr. Perez notified.      No known MARCELINO  Implanted Device  None    Risks and Recommendations  The patient has the following additional risks and recommendations for perioperative complications:  Cardiovascular:   - Cardiology consulted- atrial fibrillation  Anemia/Bleeding/Clotting:    - Will discuss if blood transfusion is appropriate with Dr. Perez  Social and Substance:    - Patient is taking medications for chronic pain    Antiplatelet or Anticoagulation Medication Instructions   - Patient is on no antiplatelet or anticoagulation medications.    Additional Medication Instructions  Take all scheduled medications on the day of surgery EXCEPT for modifications listed below:   - ACE/ARB: Continue without modification (e.g., MAC anesthesia, neurosurgery, spine surgery, heart failure, or labile hypertension with risk of hypertension).   - Beta Blockers: Continue taking on the day of surgery.   - Calcium Channel Blockers: May be continued on the day of surgery.   - Diuretics: DO NOT TAKE on the day of surgery.   - Opioids: Continue without modification.   - pregabalin, gabapentin: Continue without modification.   - SSRIs, SNRIs, TCAs, Antipsychotics: Continue without modification.    - rescue Inhaler: Continue PRN. Bring to hospital on the day of surgery.   - anticholinergics: DO NOT TAKE anticholingeric medication in older patient at risk of delirium.    - GLP-1 Injectable (exenitide, liraglutide, semaglutide, dulaglutide, etc.): DO NOT TAKE 7 days before surgery    - Topicals: DO NOT TAKE day of surgery.    Recommendation  Patient referred to cardiology for  evaluation before surgery. Surgery approval pending completion of consultation. Will addend note with updated recommendations at that time. Dr. Perez updated.    65 minutes spent by me on the date of the encounter doing chart review, history and exam, documentation and further activities per the note    Subjective      HPI related to upcoming procedure:  Alis Hartman is a 68 year old who has a history of cervix cancer treated with primary radiation (1996) and a history of VAIN III treated with laser (last 6/2021) with uterine distension on imaging and urinary incontinence. She notes pelvic discomfort and pressure, but no vaginal bleeding. Does have chronic urinary incontinence- continuous leakage, wearing a pad. Offered a Shahid catheter prior to her surgery and she declines.        Health Care Directive  Patient does not have a Health Care Directive or Living Will: Discussed advance care planning with patient; however, patient declined at this time. She states she would want to be resuscitated and would like her son to be her decision maker, declines formal paperwork at this time.    Preoperative Review of    reviewed - controlled substances prescribed by other outside provider(s). Oxycodone 15mg four times daily, zolpidem at HS, gabapentin      Status of Chronic Conditions:  HTN: On multidrug regimen with amlodipine, losartan, hydrochlorothiazide, furosemide, and atenolol. Blood pressure control remains suboptimal. Admits she does not take the furosemide as regularly as recommended. Had been recommended to have an echocardiogram per her PCP, but this has not been performed. Chronic bilateral lower extremity edema. Denies chest pain, palpitations, shortness of breath at rest, or severe headaches. She does have some RIZVI. Able to stand and do light chores, mostly limited by pain from her lower back and her knees rather than from cardiovascular/respiratory concerns.    Renal insufficiency: New in the  last year. Unclear etiology. Creatinine stable from last draw.    Hx of HCV: She states she does not have this anymore. Recent hepatic panel without significant abnormalities.    Anemia: Multifactorial- has both B12 deficiency and iron deficiency. Recently received B12 injection. Started on an iron supplement per her PCP but has not been taking this routinely. Has not had colonoscopy. Denies black tarry stools, melena, vaginal bleeding, gross hematuria, or other visible blood loss. Did report some dizziness at her PCP's office 5/2/24- Aranza feels it was due to moving too quickly.    Schizophrenia, depression, anxiety: Follows with psychiatry. On multidrug regimen with quetiapine 400mg BID ordered (states she takes this once a day instead), sertraline.    Insomnia: Taking zolpidem per her PCP    Chronic back and knee pain: Known OA, has steroid injections when needed. Using diclofenac gel to the knees. Taking oxycodone 15mg four times daily per her PCP. Taking gabapentin as well, but she is unsure of the dosing.    Urinary incontinence: Continuous leakage. Follows with urology. Prescribed Detrol- she is unsure if she is actually still taking this. Declines a Shahid catheter today- she would prefer to continue using incontinence pads at this time.    Overweight: Hx of obesity, treated with gastric bypass in the past. Off and on liraglutide due to availability of medication- has not been able to obtain this and therefore has not taken in awhile.        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 Provider to review       Cervical cancer (H) 08/01/2012      Priority: Medium     HSIL on Pap smear 10/05/2011     Priority: Medium     Hx of cervical CA treated with radiation.  Pap in fall 2011 showed HSIL   10/26/11 biopsies show radiation changes- no dysplasia or precancerous changes.  Plan-- return for pap in 3 months.  2/29/12 pap ASCUS neg HR HPV (53). Plan-- follow up in 6 months with repeat pap  8/1/12 pap LSIL. Plan-repeat pap smear in 6 months.  If LSIL or higher at that time, will colp. (due 2/1/13) reminder placed  2/11/13 pap LSIL. Plan: If today's pap is persistent LSIL or higher degree of abnormality, then the plan will be to repeat colposcopy and biopsies  4/3/13 vulvar biopsy scheduled. Cancelled.  8/12/13 pap ASCUS neg for HR HPV.  Vaginal wall biopsy- VAIN 1.  Plan-- colposcopy  9/11/13 appt scheduled/cancelled  9/23/13 colposcopy. Findings: No evidence of recurrent cancer. Plan-- continue with routine surveillance  4/9/14 pap LSIL/neg HR HPV. Plan: repeat pap at next visit  10/13/14 pap HSIL. Plan--colp  12/22/14 colposcopy. CIS/VIN3.   1/26/15 follow up visit. Plan: Proceed with colposcopy, CO2 laser of vagina   3/2/15 CO2 laser surgery   3/9/15 post op. Plan: RTC in 6 months for a repeat pap smear and pelvic exam with NP. Discussed importance of pap smears and following this schedule, if pap abnormal will need colposcopy again in future. Continue with regular preventative screening with pcp.   9/14/15 scheduled. Tracking  06/20/18 Patient is lost to pap tracking follow-up.          Past Medical History:   Diagnosis Date     Depression      Hypertension      Malignant neoplasm of endocervix (H)     Tx with radiation     Other chronic pain     Low back     Urinary incontinence      Past Surgical History:   Procedure Laterality Date     ABDOMINOPLASTY       BIOPSY CERVICAL, LOCAL EXCISION, SINGLE/MULTIPLE  10/26/2011    Procedure:BIOPSY CERVICAL, LOCAL EXCISION, SINGLE/MULTIPLE; EUA, cervical biopsies; Surgeon:BETTY TINEO; Location:[facility] OR     BIOPSY  VAGINAL N/A 6/8/2021    Procedure: BIOPSY, VAGINA;  Surgeon: Dany Perez MD;  Location: [facility] OR     EXAM UNDER ANESTHESIA PELVIC N/A 3/12/2020    Procedure: Exam under anesthsia, vaginal biopsies, possible CO2 laser of the vagina;  Surgeon: Lilliam Roy MD;  Location: [facility]     GI SURGERY  2004    gastric bypass     LASER CO2 VAGINA N/A 3/2/2015    Procedure: LASER CO2 VAGINA;  Surgeon: Mariela Abdalla MD;  Location: [facility] OR     LASER CO2 VAGINA N/A 5/24/2019    Procedure: Exam under anesthesia, vaginal biopsies, CO2 laser of the vagina;  Surgeon: Lilliam Roy MD;  Location: [facility] OR     LASER CO2 VAGINA N/A 6/8/2021    Procedure: Exam under anesthesia, laser ablation of the upper vagina;  Surgeon: Dany Perez MD;  Location: [facility] OR     Current Outpatient Medications   Medication Sig Dispense Refill     acetaminophen (TYLENOL) 500 MG tablet Take 1,000 mg by mouth       albuterol (PROAIR HFA/PROVENTIL HFA/VENTOLIN HFA) 108 (90 Base) MCG/ACT inhaler Inhale 1-2 puffs into the lungs as needed       amLODIPine (NORVASC) 5 MG tablet Take 10 mg by mouth daily       atenolol (TENORMIN) 100 MG tablet TAKE 1 TABLET(100 MG) BY MOUTH DAILY FOR HIGH BLOOD PRESSURE       benztropine (COGENTIN) 1 MG tablet Take 1 tablet by mouth daily       calcium Citrate-vitamin D 500-400 MG-UNIT CHEW Take 1 tablet by mouth       childrens multivitamin w/iron (FLINTSTONES COMPLETE) 60 MG chewable tablet Take 2 tablets by mouth       cholecalciferol 125 MCG (5000 UT) CAPS Take 5000 mg 4 times a week       diclofenac (VOLTAREN) 1 % topical gel Apply to: Skin on  Both/All Knee for pain. Topical 1% gel, 4 g applied TOPICALLY to lower extremities 3 times daily PRN ;       docusate sodium (COLACE) 100 MG capsule Take 100 mg by mouth       estradiol (ESTRACE) 0.1 MG/GM vaginal cream Place vaginally twice a week 42.5 g 3     ferrous sulfate (CASSANDRA-IN-SOL) 75 (15 FE) MG/ML oral drops Take 15 mg by mouth daily       furosemide  "(LASIX) 40 MG tablet Take 40 mg by mouth daily       hydrochlorothiazide (HYDRODIURIL) 25 MG tablet Take 25 mg by mouth       lidocaine (XYLOCAINE) 5 % external ointment Apply 1 Application topically as needed       liraglutide - Weight Management (SAXENDA) 18 MG/3ML pen Inject 2.4 mg Subcutaneous       losartan (COZAAR) 50 MG tablet Take 50 mg by mouth daily       NARCAN 4 MG/0.1ML nasal spray TK UTD PER PACKAGE INSERT  0     oxyCODONE IR (ROXICODONE) 10 MG tablet Take 1 tablet (10 mg) by mouth 3 times daily as needed Do not take while taking Vicodin.       phenazopyridine (PYRIDIUM) 100 MG tablet Take 1 tablet (100 mg) by mouth 3 times daily as needed for urinary tract discomfort 90 tablet 3     QUEtiapine (SEROQUEL) 400 MG tablet Take 400 mg by mouth       sertraline (ZOLOFT) 25 MG tablet Take 1 tablet by mouth daily.       tolterodine ER (DETROL LA) 4 MG 24 hr capsule Take 1 capsule (4 mg) by mouth daily 90 capsule 0     triamcinolone (KENALOG) 0.1 % external ointment Apply a pea sized amount to the vulva and vaginal opening as needed for irritation. May mix with nystatin, but do not use more than 1x daily 30 g 3     zolpidem (AMBIEN) 10 MG tablet Take 10 mg by mouth         Allergies   Allergen Reactions     Ibuprofen Nausea and Vomiting     Shrimp      Sulfa Antibiotics Rash        Social History     Tobacco Use     Smoking status: Never     Smokeless tobacco: Never   Substance Use Topics     Alcohol use: Yes     Comment: Rare     Denies family history of post-operative complications or anesthesia complications  History   Drug Use No             Objective   BP (!) 142/85 (BP Location: Right arm, Patient Position: Chair, Cuff Size: Adult Regular)   Pulse 92   Ht 1.522 m (4' 11.92\")   Wt 74.8 kg (165 lb)   SpO2 99%   BMI 32.31 kg/m     Estimated body mass index is 29.76 kg/m  as calculated from the following:    Height as of 2/21/23: 1.6 m (5' 3\").    Weight as of 4/23/24: 76.2 kg (168 lb).  Physical " Exam  CONSTITUTIONAL: Alert non-toxic appearing female in no acute distress  HEAD: Normocephalic, atraumatic  EYES: PERRLA; no scleral icterus  ENT: Oropharynx pink without lesions  NECK: Neck supple without lymphadenopathy  RESPIRATORY: Lungs clear to auscultation, respirations unlabored  CV: Irregularly irregular rhythm, no clicks, murmurs, rubs, or gallops; bilateral lower extremities with 1-2+ pitting edema, equal bilaterally  GASTROINTESTINAL: Normoactive bowel sounds x4 quadrants, abdomen soft, non-distended- slightly tender to palpation in bilateral lower quadrants  LYMPHATIC: Cervical, supraclavicular without lymphadenopathy  MUSCULOSKELETAL: Moves all extremities, no obvious muscle wasting  NEUROLOGIC: No gross deficits, antalgic gait, using cane  SKIN: Appropriate color for race, warm and dry, no rashes or lesions to unclothed skin  PSYCHIATRIC: Pleasant and interactive, affect bright, makes appropriate eye contact, thought process linear        Diagnostics  Recent Results (from the past 24 hour(s))   Basic metabolic panel    Collection Time: 05/15/24  7:33 AM   Result Value Ref Range    Sodium 141 135 - 145 mmol/L    Potassium 3.8 3.4 - 5.3 mmol/L    Chloride 105 98 - 107 mmol/L    Carbon Dioxide (CO2) 25 22 - 29 mmol/L    Anion Gap 11 7 - 15 mmol/L    Urea Nitrogen 16.6 8.0 - 23.0 mg/dL    Creatinine 1.24 (H) 0.51 - 0.95 mg/dL    GFR Estimate 47 (L) >60 mL/min/1.73m2    Calcium 9.0 8.8 - 10.2 mg/dL    Glucose 111 (H) 70 - 99 mg/dL   CBC with platelets and differential    Collection Time: 05/15/24  7:33 AM   Result Value Ref Range    WBC Count 10.2 4.0 - 11.0 10e3/uL    RBC Count 3.83 3.80 - 5.20 10e6/uL    Hemoglobin 7.9 (LL) 11.7 - 15.7 g/dL    Hematocrit 27.7 (L) 35.0 - 47.0 %    MCV 72 (L) 78 - 100 fL    MCH 20.6 (L) 26.5 - 33.0 pg    MCHC 28.5 (L) 31.5 - 36.5 g/dL    RDW 19.9 (H) 10.0 - 15.0 %    Platelet Count 455 (H) 150 - 450 10e3/uL    % Neutrophils 66 %    % Lymphocytes 25 %    % Monocytes 7 %     % Eosinophils 1 %    % Basophils 1 %    % Immature Granulocytes 1 %    NRBCs per 100 WBC 0 <1 /100    Absolute Neutrophils 6.7 1.6 - 8.3 10e3/uL    Absolute Lymphocytes 2.6 0.8 - 5.3 10e3/uL    Absolute Monocytes 0.7 0.0 - 1.3 10e3/uL    Absolute Eosinophils 0.1 0.0 - 0.7 10e3/uL    Absolute Basophils 0.1 0.0 - 0.2 10e3/uL    Absolute Immature Granulocytes 0.1 <=0.4 10e3/uL    Absolute NRBCs 0.0 10e3/uL   Magnesium    Collection Time: 05/15/24  7:33 AM   Result Value Ref Range    Magnesium 2.0 1.7 - 2.3 mg/dL      EKG required for per request of Dr. Perez and not completed in the last 90 days.   EKG: atrial fibrillation, rate controlled    Revised Cardiac Risk Index (RCRI)  The patient has the following serious cardiovascular risks for perioperative complications:   - No serious cardiac risks = 0 points   - 0 points per RCRI calculator- however, given concern for new atrial fibrillation, referred to cardiology  RCRI Interpretation: 0 points: Class I (very low risk - 0.4% complication rate)         Signed Electronically by: ERINN Hammond CNP  Copy of this evaluation report is provided to requesting physician.      DATE/TIME OF CALL RECEIVED FROM LAB:  05/15/24 at 7:52 AM   LAB TEST:  Hgb  LAB VALUE:  7.9  PROVIDER NOTIFIED?: Yes  PROVIDER NAME: Marcia Painting NP  DATE/TIME LAB VALUE REPORTED TO PROVIDER: 0753  MECHANISM OF PROVIDER NOTIFICATION: Face-To-Face  PROVIDER RESPONSE: Patient is being seen by provider in clinic today.     Monika Gaston, RN-BSN, PHN, OCN  [facility]        Again, thank you for allowing me to participate in the care of your patient.        Sincerely,        ERINN Hammond CNP

## 2024-05-15 NOTE — NURSING NOTE
"Oncology Rooming Note    May 15, 2024 8:08 AM   Alis Hartman is a 68 year old female who presents for:    Chief Complaint   Patient presents with    Oncology Clinic Visit     Initial Vitals: BP (!) 142/85 (BP Location: Right arm, Patient Position: Chair, Cuff Size: Adult Regular)   Pulse 92   Ht 1.522 m (4' 11.92\")   Wt 74.8 kg (165 lb)   SpO2 99%   BMI 32.31 kg/m   Estimated body mass index is 32.31 kg/m  as calculated from the following:    Height as of this encounter: 1.522 m (4' 11.92\").    Weight as of this encounter: 74.8 kg (165 lb). Body surface area is 1.78 meters squared.  Extreme Pain (9) Comment: Data Unavailable   No LMP recorded. Patient is postmenopausal.  Allergies reviewed: Yes  Medications reviewed: Yes    Medications: Medication refills not needed today.  Pharmacy name entered into SeeJay:    [id number] [place], [place] - [address]  [id number], [place] - [id number] [address] [id number] [place], [place] - [address] AT [facility] & [address]    Frailty Screening:   Is the patient here for a new oncology consult visit in cancer care? 2. No      Clinical concerns: NO       Jewels Stone CMA              "

## 2024-05-16 ENCOUNTER — PATIENT OUTREACH (OUTPATIENT)
Dept: ONCOLOGY | Facility: CLINIC | Age: 69
End: 2024-05-16

## 2024-05-16 ENCOUNTER — ANCILLARY PROCEDURE (OUTPATIENT)
Dept: CARDIOLOGY | Facility: CLINIC | Age: 69
End: 2024-05-16
Attending: NURSE PRACTITIONER
Payer: COMMERCIAL

## 2024-05-16 DIAGNOSIS — I48.91 ATRIAL FIBRILLATION, UNSPECIFIED TYPE (H): ICD-10-CM

## 2024-05-16 LAB — LVEF ECHO: NORMAL

## 2024-05-16 PROCEDURE — 93306 TTE W/DOPPLER COMPLETE: CPT | Performed by: INTERNAL MEDICINE

## 2024-05-16 NOTE — PROGRESS NOTES
[facility]: Cancer Care Note                                                          Writer received the following message from Marcia Painting CNP, regarding patient's surgery for tomorrow (5/17/24) - after discussion with Cardiology and Dr. Perez:    Marcia Painting, Nicholas Doshi CNP, RN; Precious Moe  It's a go! We are pushing forward after talking with cards and Dr. Perez-  Nicholas, could you call her and let her know? Also, I have the following meds that she should HOLD prior to surgery:  Hold furosemide, hydrochlorothiazide, apixaban (has not yet started), Detrol, and Cogentin morning of procedure.    Thanks!  Marcia     Attempted calling patient x 2 this afternoon, but it went to voicemail.  Message was left.      Also sent patient a detailed [employer] message with above information, and encouraged patient to reach out with any questions or concerns.      Nicholas Jarrell, RN, BSN, OCN  RN Care Coordinator - Oncology  [facility]

## 2024-05-16 NOTE — OR NURSING
The following message was sent, via Secure Chat, with high importance to Marcia Painting APRN CNP (Also cc'ed Nicholas Love): Yobany Kennedy, I will be doing pt's pre-op call for her surgery tomorrow. I noticed your note from yesterday says co-sign needed, Can you please arrange for your note to be cosigned today? Also should pt take or hold Losartan on DOS?     The following message from Marcia Painting was received : she'll take the losartan (I believe I have that in my note)- Dr. Perez will be signing her note this afternoon. Thanks!

## 2024-05-16 NOTE — PROGRESS NOTES
Spoke with Marcia Painting CNP, regarding pre-op exam and follow-up. Patient was in atrial fibrillation with rate control at her recent visit. She was seen by cardiology urgently and was in normal sinus rhythm at that visit. Cardiology felt it was safe to proceed with planned procedure and made recommendations for subsequent management following surgery. Given the urgent nature of her procedure, we will proceed as scheduled, and ensure appropriate follow-up post-operative.    Dany Perez MD   Gynecologic Oncology

## 2024-05-16 NOTE — OR NURSING
RE: AA: Hemoglobin 7.9.  Received: Today  Solomon De Souza MD Johnson, Judy, RN; Dany Perez MD; [facility]  Cc: Nicholas Jarrell, RN  Thanks, as always we are happy to see folks in PAC virtually or in person.  GYN onc and CV medicine are aware of her anemia.  She was seen by CV medicine today, had a relatigvely normal TTE and was to be started on a NOAC for a fib, although she was found to be in NSR today. She is on  Fe and she has multiple issues that may be contributing to her anemia and the proposed procedure under MAC is felt to be indicated to further evaluate things that may be contributing to her anemia.      I am copying Dr. Delio Ryan who is the AIC for tomorrow at the  so that the team there is aware of her Hgb.    Buck De Souza MD  PAC attending  C - [phone]          Previous Messages       ----- Message -----  From: Ying Escobar RN  Sent: 5/16/2024   2:10 PM CDT  To: Solomon De Souza MD; Nicholas Jarrell RN; *  Subject: AA: Hemoglobin 7.9.                              Hello,    Pt is scheduled for Dilation and curettage of the uterus with drainage of uterine fluid under ultrasound guidance,MAC,  Cystoscopy with backfill of the bladder, MAC, tomorrow.    Per Anesthesia Guidelines we notify the provider if hemoglobin is < 8.  Pt's Hemoglobin yesterday was 7.9.    Kind regards,    Ying Escobar RN, BSN  Pre-Anesthesia Screening  [phone] Office

## 2024-05-17 ENCOUNTER — HOSPITAL ENCOUNTER (OUTPATIENT)
Dept: ULTRASOUND IMAGING | Facility: CLINIC | Age: 69
Discharge: HOME | End: 2024-05-17
Attending: OBSTETRICS & GYNECOLOGY | Admitting: OBSTETRICS & GYNECOLOGY
Payer: COMMERCIAL

## 2024-05-17 ENCOUNTER — HOSPITAL ENCOUNTER (OUTPATIENT)
Facility: CLINIC | Age: 69
Discharge: HOME | End: 2024-05-17
Attending: OBSTETRICS & GYNECOLOGY | Admitting: OBSTETRICS & GYNECOLOGY
Payer: COMMERCIAL

## 2024-05-17 ENCOUNTER — ANESTHESIA EVENT (OUTPATIENT)
Dept: SURGERY | Facility: CLINIC | Age: 69
End: 2024-05-17
Payer: COMMERCIAL

## 2024-05-17 ENCOUNTER — ANESTHESIA (OUTPATIENT)
Dept: SURGERY | Facility: CLINIC | Age: 69
End: 2024-05-17
Payer: COMMERCIAL

## 2024-05-17 VITALS
HEART RATE: 71 BPM | DIASTOLIC BLOOD PRESSURE: 73 MMHG | WEIGHT: 164.02 LBS | SYSTOLIC BLOOD PRESSURE: 149 MMHG | BODY MASS INDEX: 29.06 KG/M2 | OXYGEN SATURATION: 96 % | HEIGHT: 63 IN | TEMPERATURE: 98.1 F | RESPIRATION RATE: 16 BRPM

## 2024-05-17 DIAGNOSIS — N85.8 UTERINE MASS: ICD-10-CM

## 2024-05-17 DIAGNOSIS — C53.8 MALIGNANT NEOPLASM OF OVERLAPPING SITES OF CERVIX (H): Primary | ICD-10-CM

## 2024-05-17 DIAGNOSIS — R32 URINARY INCONTINENCE, UNSPECIFIED TYPE: ICD-10-CM

## 2024-05-17 LAB
ABO/RH(D): NORMAL
ANTIBODY SCREEN: NEGATIVE
GLUCOSE BLDC GLUCOMTR-MCNC: 79 MG/DL (ref 70–99)
HGB BLD-MCNC: 7.7 G/DL (ref 11.7–15.7)
SPECIMEN EXPIRATION DATE: NORMAL

## 2024-05-17 PROCEDURE — 88112 CYTOPATH CELL ENHANCE TECH: CPT | Mod: TC | Performed by: OBSTETRICS & GYNECOLOGY

## 2024-05-17 PROCEDURE — 999N000141 HC STATISTIC PRE-PROCEDURE NURSING ASSESSMENT: Performed by: OBSTETRICS & GYNECOLOGY

## 2024-05-17 PROCEDURE — 85018 HEMOGLOBIN: CPT | Performed by: ANESTHESIOLOGY

## 2024-05-17 PROCEDURE — 999N000063 US INTRAOPERATIVE: Mod: TC

## 2024-05-17 PROCEDURE — 52005 CYSTO W/URTRL CATHJ: CPT | Performed by: ANESTHESIOLOGY

## 2024-05-17 PROCEDURE — 250N000011 HC RX IP 250 OP 636: Performed by: NURSE ANESTHETIST, CERTIFIED REGISTERED

## 2024-05-17 PROCEDURE — 250N000013 HC RX MED GY IP 250 OP 250 PS 637: Performed by: OBSTETRICS & GYNECOLOGY

## 2024-05-17 PROCEDURE — 88112 CYTOPATH CELL ENHANCE TECH: CPT | Mod: 26 | Performed by: PATHOLOGY

## 2024-05-17 PROCEDURE — 88341 IMHCHEM/IMCYTCHM EA ADD ANTB: CPT | Mod: 26 | Performed by: PATHOLOGY

## 2024-05-17 PROCEDURE — 250N000011 HC RX IP 250 OP 636: Performed by: OBSTETRICS & GYNECOLOGY

## 2024-05-17 PROCEDURE — 88305 TISSUE EXAM BY PATHOLOGIST: CPT | Mod: 26 | Performed by: PATHOLOGY

## 2024-05-17 PROCEDURE — 82962 GLUCOSE BLOOD TEST: CPT

## 2024-05-17 PROCEDURE — 88342 IMHCHEM/IMCYTCHM 1ST ANTB: CPT | Mod: TC,XU | Performed by: OBSTETRICS & GYNECOLOGY

## 2024-05-17 PROCEDURE — 710N000012 HC RECOVERY PHASE 2, PER MINUTE: Performed by: OBSTETRICS & GYNECOLOGY

## 2024-05-17 PROCEDURE — 88360 TUMOR IMMUNOHISTOCHEM/MANUAL: CPT | Mod: 26 | Performed by: PATHOLOGY

## 2024-05-17 PROCEDURE — 250N000013 HC RX MED GY IP 250 OP 250 PS 637: Performed by: ANESTHESIOLOGY

## 2024-05-17 PROCEDURE — 88342 IMHCHEM/IMCYTCHM 1ST ANTB: CPT | Mod: 26 | Performed by: PATHOLOGY

## 2024-05-17 PROCEDURE — 52005 CYSTO W/URTRL CATHJ: CPT | Performed by: NURSE ANESTHETIST, CERTIFIED REGISTERED

## 2024-05-17 PROCEDURE — 370N000017 HC ANESTHESIA TECHNICAL FEE, PER MIN: Performed by: OBSTETRICS & GYNECOLOGY

## 2024-05-17 PROCEDURE — 258N000003 HC RX IP 258 OP 636: Performed by: NURSE ANESTHETIST, CERTIFIED REGISTERED

## 2024-05-17 PROCEDURE — 272N000001 HC OR GENERAL SUPPLY STERILE: Performed by: OBSTETRICS & GYNECOLOGY

## 2024-05-17 PROCEDURE — 250N000009 HC RX 250: Performed by: NURSE ANESTHETIST, CERTIFIED REGISTERED

## 2024-05-17 PROCEDURE — 250N000011 HC RX IP 250 OP 636: Performed by: ANESTHESIOLOGY

## 2024-05-17 PROCEDURE — 86900 BLOOD TYPING SEROLOGIC ABO: CPT | Performed by: ANESTHESIOLOGY

## 2024-05-17 PROCEDURE — 36415 COLL VENOUS BLD VENIPUNCTURE: CPT | Performed by: ANESTHESIOLOGY

## 2024-05-17 PROCEDURE — 360N000075 HC SURGERY LEVEL 2, PER MIN: Performed by: OBSTETRICS & GYNECOLOGY

## 2024-05-17 RX ORDER — FENTANYL CITRATE 50 UG/ML
25 INJECTION, SOLUTION INTRAMUSCULAR; INTRAVENOUS EVERY 5 MIN PRN
Status: DISCONTINUED | OUTPATIENT
Start: 2024-05-17 | End: 2024-05-17 | Stop reason: HOSPADM

## 2024-05-17 RX ORDER — LIDOCAINE HYDROCHLORIDE 20 MG/ML
INJECTION, SOLUTION INFILTRATION; PERINEURAL PRN
Status: DISCONTINUED | OUTPATIENT
Start: 2024-05-17 | End: 2024-05-17

## 2024-05-17 RX ORDER — ACETAMINOPHEN 325 MG/1
975 TABLET ORAL ONCE
Status: DISCONTINUED | OUTPATIENT
Start: 2024-05-17 | End: 2024-05-17 | Stop reason: HOSPADM

## 2024-05-17 RX ORDER — NALOXONE HYDROCHLORIDE 0.4 MG/ML
0.1 INJECTION, SOLUTION INTRAMUSCULAR; INTRAVENOUS; SUBCUTANEOUS
Status: DISCONTINUED | OUTPATIENT
Start: 2024-05-17 | End: 2024-05-17 | Stop reason: HOSPADM

## 2024-05-17 RX ORDER — ACETAMINOPHEN 325 MG/1
975 TABLET ORAL
Status: COMPLETED | OUTPATIENT
Start: 2024-05-17 | End: 2024-05-17

## 2024-05-17 RX ORDER — SODIUM CHLORIDE, SODIUM LACTATE, POTASSIUM CHLORIDE, CALCIUM CHLORIDE 600; 310; 30; 20 MG/100ML; MG/100ML; MG/100ML; MG/100ML
INJECTION, SOLUTION INTRAVENOUS CONTINUOUS
Status: DISCONTINUED | OUTPATIENT
Start: 2024-05-17 | End: 2024-05-17 | Stop reason: HOSPADM

## 2024-05-17 RX ORDER — PROPOFOL 10 MG/ML
INJECTION, EMULSION INTRAVENOUS CONTINUOUS PRN
Status: DISCONTINUED | OUTPATIENT
Start: 2024-05-17 | End: 2024-05-17

## 2024-05-17 RX ORDER — ONDANSETRON 2 MG/ML
INJECTION INTRAMUSCULAR; INTRAVENOUS PRN
Status: DISCONTINUED | OUTPATIENT
Start: 2024-05-17 | End: 2024-05-17

## 2024-05-17 RX ORDER — HYDROMORPHONE HYDROCHLORIDE 1 MG/ML
0.4 INJECTION, SOLUTION INTRAMUSCULAR; INTRAVENOUS; SUBCUTANEOUS EVERY 5 MIN PRN
Status: DISCONTINUED | OUTPATIENT
Start: 2024-05-17 | End: 2024-05-17 | Stop reason: HOSPADM

## 2024-05-17 RX ORDER — PHENAZOPYRIDINE HYDROCHLORIDE 200 MG/1
200 TABLET, FILM COATED ORAL ONCE
Status: COMPLETED | OUTPATIENT
Start: 2024-05-17 | End: 2024-05-17

## 2024-05-17 RX ORDER — SODIUM CHLORIDE, SODIUM LACTATE, POTASSIUM CHLORIDE, CALCIUM CHLORIDE 600; 310; 30; 20 MG/100ML; MG/100ML; MG/100ML; MG/100ML
INJECTION, SOLUTION INTRAVENOUS CONTINUOUS PRN
Status: DISCONTINUED | OUTPATIENT
Start: 2024-05-17 | End: 2024-05-17

## 2024-05-17 RX ORDER — OXYCODONE HYDROCHLORIDE 5 MG/1
5 TABLET ORAL
Status: DISCONTINUED | OUTPATIENT
Start: 2024-05-17 | End: 2024-05-17 | Stop reason: HOSPADM

## 2024-05-17 RX ORDER — MEPERIDINE HYDROCHLORIDE 25 MG/ML
12.5 INJECTION INTRAMUSCULAR; INTRAVENOUS; SUBCUTANEOUS EVERY 5 MIN PRN
Status: DISCONTINUED | OUTPATIENT
Start: 2024-05-17 | End: 2024-05-17 | Stop reason: HOSPADM

## 2024-05-17 RX ORDER — HYDROMORPHONE HYDROCHLORIDE 1 MG/ML
0.2 INJECTION, SOLUTION INTRAMUSCULAR; INTRAVENOUS; SUBCUTANEOUS EVERY 5 MIN PRN
Status: DISCONTINUED | OUTPATIENT
Start: 2024-05-17 | End: 2024-05-17 | Stop reason: HOSPADM

## 2024-05-17 RX ORDER — CEFAZOLIN SODIUM/WATER 2 G/20 ML
2 SYRINGE (ML) INTRAVENOUS SEE ADMIN INSTRUCTIONS
Status: DISCONTINUED | OUTPATIENT
Start: 2024-05-17 | End: 2024-05-17 | Stop reason: HOSPADM

## 2024-05-17 RX ORDER — HYDROXYZINE HYDROCHLORIDE 10 MG/1
10 TABLET, FILM COATED ORAL EVERY 6 HOURS PRN
Status: DISCONTINUED | OUTPATIENT
Start: 2024-05-17 | End: 2024-05-17 | Stop reason: HOSPADM

## 2024-05-17 RX ORDER — OXYCODONE HYDROCHLORIDE 10 MG/1
10 TABLET ORAL
Status: COMPLETED | OUTPATIENT
Start: 2024-05-17 | End: 2024-05-17

## 2024-05-17 RX ORDER — CEFAZOLIN SODIUM/WATER 2 G/20 ML
2 SYRINGE (ML) INTRAVENOUS
Status: COMPLETED | OUTPATIENT
Start: 2024-05-17 | End: 2024-05-17

## 2024-05-17 RX ORDER — PROPOFOL 10 MG/ML
INJECTION, EMULSION INTRAVENOUS PRN
Status: DISCONTINUED | OUTPATIENT
Start: 2024-05-17 | End: 2024-05-17

## 2024-05-17 RX ORDER — ONDANSETRON 4 MG/1
4 TABLET, ORALLY DISINTEGRATING ORAL EVERY 30 MIN PRN
Status: DISCONTINUED | OUTPATIENT
Start: 2024-05-17 | End: 2024-05-17 | Stop reason: HOSPADM

## 2024-05-17 RX ORDER — FENTANYL CITRATE 50 UG/ML
50 INJECTION, SOLUTION INTRAMUSCULAR; INTRAVENOUS EVERY 5 MIN PRN
Status: DISCONTINUED | OUTPATIENT
Start: 2024-05-17 | End: 2024-05-17 | Stop reason: HOSPADM

## 2024-05-17 RX ORDER — ALBUTEROL SULFATE 0.83 MG/ML
2.5 SOLUTION RESPIRATORY (INHALATION) EVERY 4 HOURS PRN
Status: DISCONTINUED | OUTPATIENT
Start: 2024-05-17 | End: 2024-05-17 | Stop reason: HOSPADM

## 2024-05-17 RX ORDER — FENTANYL CITRATE 50 UG/ML
INJECTION, SOLUTION INTRAMUSCULAR; INTRAVENOUS PRN
Status: DISCONTINUED | OUTPATIENT
Start: 2024-05-17 | End: 2024-05-17

## 2024-05-17 RX ORDER — LORAZEPAM 2 MG/ML
.5-1 INJECTION INTRAMUSCULAR
Status: DISCONTINUED | OUTPATIENT
Start: 2024-05-17 | End: 2024-05-17 | Stop reason: HOSPADM

## 2024-05-17 RX ORDER — ONDANSETRON 2 MG/ML
4 INJECTION INTRAMUSCULAR; INTRAVENOUS EVERY 30 MIN PRN
Status: DISCONTINUED | OUTPATIENT
Start: 2024-05-17 | End: 2024-05-17 | Stop reason: HOSPADM

## 2024-05-17 RX ORDER — ACETAMINOPHEN 325 MG/1
650 TABLET ORAL EVERY 6 HOURS PRN
Qty: 24 TABLET | Refills: 0 | Status: SHIPPED | OUTPATIENT
Start: 2024-05-17

## 2024-05-17 RX ORDER — DEXAMETHASONE SODIUM PHOSPHATE 4 MG/ML
4 INJECTION, SOLUTION INTRA-ARTICULAR; INTRALESIONAL; INTRAMUSCULAR; INTRAVENOUS; SOFT TISSUE
Status: DISCONTINUED | OUTPATIENT
Start: 2024-05-17 | End: 2024-05-17 | Stop reason: HOSPADM

## 2024-05-17 RX ORDER — HEPARIN SODIUM 5000 [USP'U]/.5ML
5000 INJECTION, SOLUTION INTRAVENOUS; SUBCUTANEOUS
Status: COMPLETED | OUTPATIENT
Start: 2024-05-17 | End: 2024-05-17

## 2024-05-17 RX ADMIN — FENTANYL CITRATE 50 MCG: 50 INJECTION INTRAMUSCULAR; INTRAVENOUS at 13:05

## 2024-05-17 RX ADMIN — HEPARIN SODIUM 5000 UNITS: 5000 INJECTION, SOLUTION INTRAVENOUS; SUBCUTANEOUS at 10:49

## 2024-05-17 RX ADMIN — FENTANYL CITRATE 25 MCG: 50 INJECTION INTRAMUSCULAR; INTRAVENOUS at 11:51

## 2024-05-17 RX ADMIN — Medication 2 G: at 11:40

## 2024-05-17 RX ADMIN — SODIUM CHLORIDE, POTASSIUM CHLORIDE, SODIUM LACTATE AND CALCIUM CHLORIDE: 600; 310; 30; 20 INJECTION, SOLUTION INTRAVENOUS at 11:34

## 2024-05-17 RX ADMIN — FENTANYL CITRATE 25 MCG: 50 INJECTION INTRAMUSCULAR; INTRAVENOUS at 11:38

## 2024-05-17 RX ADMIN — MIDAZOLAM 1 MG: 1 INJECTION INTRAMUSCULAR; INTRAVENOUS at 11:38

## 2024-05-17 RX ADMIN — PROPOFOL 30 MG: 10 INJECTION, EMULSION INTRAVENOUS at 11:41

## 2024-05-17 RX ADMIN — FENTANYL CITRATE 50 MCG: 50 INJECTION INTRAMUSCULAR; INTRAVENOUS at 12:49

## 2024-05-17 RX ADMIN — PHENYLEPHRINE HYDROCHLORIDE 100 MCG: 10 INJECTION INTRAVENOUS at 11:57

## 2024-05-17 RX ADMIN — HYDROMORPHONE HYDROCHLORIDE 0.4 MG: 1 INJECTION, SOLUTION INTRAMUSCULAR; INTRAVENOUS; SUBCUTANEOUS at 15:16

## 2024-05-17 RX ADMIN — ONDANSETRON 4 MG: 2 INJECTION INTRAMUSCULAR; INTRAVENOUS at 12:16

## 2024-05-17 RX ADMIN — PHENYLEPHRINE HYDROCHLORIDE 100 MCG: 10 INJECTION INTRAVENOUS at 12:04

## 2024-05-17 RX ADMIN — MIDAZOLAM 1 MG: 1 INJECTION INTRAMUSCULAR; INTRAVENOUS at 11:34

## 2024-05-17 RX ADMIN — FENTANYL CITRATE 25 MCG: 50 INJECTION INTRAMUSCULAR; INTRAVENOUS at 12:11

## 2024-05-17 RX ADMIN — ACETAMINOPHEN 975 MG: 325 TABLET, FILM COATED ORAL at 15:32

## 2024-05-17 RX ADMIN — HYDROMORPHONE HYDROCHLORIDE 0.4 MG: 1 INJECTION, SOLUTION INTRAMUSCULAR; INTRAVENOUS; SUBCUTANEOUS at 14:52

## 2024-05-17 RX ADMIN — PHENAZOPYRIDINE 200 MG: 200 TABLET ORAL at 10:48

## 2024-05-17 RX ADMIN — HYDROMORPHONE HYDROCHLORIDE 0.4 MG: 1 INJECTION, SOLUTION INTRAMUSCULAR; INTRAVENOUS; SUBCUTANEOUS at 14:41

## 2024-05-17 RX ADMIN — PROPOFOL 100 MCG/KG/MIN: 10 INJECTION, EMULSION INTRAVENOUS at 11:41

## 2024-05-17 RX ADMIN — FENTANYL CITRATE 25 MCG: 50 INJECTION INTRAMUSCULAR; INTRAVENOUS at 12:07

## 2024-05-17 RX ADMIN — PHENYLEPHRINE HYDROCHLORIDE 100 MCG: 10 INJECTION INTRAVENOUS at 12:15

## 2024-05-17 RX ADMIN — OXYCODONE HYDROCHLORIDE 10 MG: 5 TABLET ORAL at 14:50

## 2024-05-17 RX ADMIN — HYDROMORPHONE HYDROCHLORIDE 0.4 MG: 1 INJECTION, SOLUTION INTRAMUSCULAR; INTRAVENOUS; SUBCUTANEOUS at 13:29

## 2024-05-17 RX ADMIN — LIDOCAINE HYDROCHLORIDE 80 MG: 20 INJECTION, SOLUTION INFILTRATION; PERINEURAL at 11:40

## 2024-05-17 ASSESSMENT — ACTIVITIES OF DAILY LIVING (ADL)
ADLS_ACUITY_SCORE: 40
ADLS_ACUITY_SCORE: 40
ADLS_ACUITY_SCORE: 33
ADLS_ACUITY_SCORE: 35
ADLS_ACUITY_SCORE: 40

## 2024-05-17 NOTE — PROVIDER NOTIFICATION
Paged 1921:Mayda Hartmanwamariana 3C 1. pt is still having 7/10 pain but she said it is tolerable has urinated. Please advise thanks Anita fan *[phone]    OB resident came to bedside and discussed pt going home today after another 30 minutes. Pt said she wanted to go home and she thinks her pain is well controlled. Anita Fan RN on 5/17/2024 at 4:29 PM

## 2024-05-17 NOTE — ANESTHESIA CARE TRANSFER NOTE
Patient: Alis Hartman    Procedure: Procedure(s):  Dilation and curettage of the uterus with drainage of uterine fluid under ultrasound guidance, lysis of vaginal adhesions  Cystoscopy       Diagnosis: Uterine mass [N85.8]  Other urinary incontinence [N39.498]  Diagnosis Additional Information: No value filed.    Anesthesia Type:   MAC     Note:    Oropharynx: spontaneously breathing  Level of Consciousness: awake  Oxygen Supplementation: room air    Independent Airway: airway patency satisfactory and stable  Dentition: dentition unchanged  Vital Signs Stable: post-procedure vital signs reviewed and stable  Report to RN Given: handoff report given  Patient transferred to: Phase II  Comments: VSS. Report to RN. Patient awake. Airway patent.  Handoff Report: Identifed the Patient, Identified the Reponsible Provider, Reviewed the pertinent medical history, Discussed the surgical course, Reviewed Intra-OP anesthesia mangement and issues during anesthesia, Set expectations for post-procedure period and Allowed opportunity for questions and acknowledgement of understanding      Vitals:  Vitals Value Taken Time   BP     Temp     Pulse     Resp     SpO2         Electronically Signed By: ERINN Esteban CRNA  May 17, 2024  12:42 PM

## 2024-05-17 NOTE — OR NURSING
Pt arrived as MAC sedation to 3c; reporting pain 10/10 vaginal cramping.  Anes Beherns informed and order for IV narc obtained.  PT medicated - SEE Emar; presently reporting pain 8/10; does report is improving.  Pt states pain on arrival to 3c this AM 7/10 - also noted home pain level 5-7/10.  Minimal vaginal discharge noted.  Will cont to monitor and medicate pt as appropriate.

## 2024-05-17 NOTE — ANESTHESIA POSTPROCEDURE EVALUATION
Patient: Alis Hartman    Procedure: Procedure(s):  Dilation and curettage of the uterus with drainage of uterine fluid under ultrasound guidance, lysis of vaginal adhesions  Cystoscopy       Anesthesia Type:  MAC    Note:  Disposition: Outpatient   Postop Pain Control: Uneventful            Sign Out: Well controlled pain   PONV: No   Neuro/Psych: Uneventful            Sign Out: Acceptable/Baseline neuro status   Airway/Respiratory: Uneventful            Sign Out: Acceptable/Baseline resp. status   CV/Hemodynamics: Uneventful            Sign Out: Acceptable CV status   Other NRE: NONE   DID A NON-ROUTINE EVENT OCCUR? No           Last vitals:  Vitals:    05/17/24 1031 05/17/24 1100   BP: (!) 140/41 131/80   Pulse: 64    Resp: 16    Temp: 37.3  C (99.2  F)    SpO2: 100%        Electronically Signed By: Christopher J. Behrens, MD  May 17, 2024  12:49 PM

## 2024-05-17 NOTE — DISCHARGE INSTRUCTIONS
Contacting your Doctor -   To contact a doctor, call Dr[facility] at [phone] (Mon-Fri, 8:00-4:30) or:  [phone] and ask for the resident on call for Gynecology (answered 24 hours a day)  [phone] if you are in need of immediate or emergent help

## 2024-05-17 NOTE — PROGRESS NOTES
Gynecologic Oncology Postoperative Check Note  5/17/2024    S: Patient reports she is doing well postoperatively. Pain IS controlled to her baseline of chronic pain with oral pain medication. Ambulating without pain. Voiding spontaneously. Tolerating crackers and water without nausea or vomiting. Denies chest pain, shortness of breath, dizziness, or other concerns at this time.     O:  Vitals:    05/17/24 1520 05/17/24 1532 05/17/24 1546 05/17/24 1555   BP:  (!) 149/73     BP Location:       Pulse: 71      Resp:  16     Temp:       TempSrc:       SpO2: 98% 97% 99% 96%   Weight:       Height:           Gen: In NAD distress, sitting up in chair talking  Cardio: RRR, S1/S2, no murmurs  Resp: CTAB, no wheezing or crackles  Abdomen: soft, appropriately tender  Extremities: Non-tender, trace edema    A: 68 year old POD#0 s/p D&C, lysis of vaginal adhesions. Doing well in the post-op period and cleared for discharge.    Dz: Pending pathology  FEN: Advance as tolerated  Pain: tylenol PRN  GI: PTA bowel regimen  : voiding spontaneously    Dispo: To home    Ashwin Mathis MD  Gynecologic-Oncology service  Obstetrics and Gynecology Resident, PGY-1  May 17, 2024, 5:06 PM

## 2024-05-17 NOTE — OP NOTE
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  Distended uterus on imaging  Uterine mass  Urinary incontinence  Hypertension      Post-operative diagnosis: Same with evacuation of tissue    Procedure(s):  Dilation and curettage of the uterus with drainage of uterine fluid under ultrasound guidance, lysis of vaginal adhesions  Cystoscopy    Surgeons and Role:     * Dnay Perez MD - Primary     * Zoey Agustin MD - Resident - Assisting    Anesthesia:   MAC     EBL: 20ml    Drains: None    Specimen(s):  ID Type Source Tests Collected by Time Destination   1 : endometrial curretings Tissue Endometrium SURGICAL PATHOLOGY EXAM Dany Perez MD 5/17/2024 12:04 PM    2 : Bladder cytology Urine Urine, Catheter NON-GYNECOLOGIC CYTOLOGY Dany Perez MD 5/17/2024 12:25 PM        Findings: On ultrasound tissue and fluid within the distended uterus. Bladder somewhat distended. Vagina was agglutinated beginning about 3-4cm within the vaginal introitus. Bluntly this was opened. There was no discernible cervix but within the uterus and likely distended cervix there was a large amount of necrotic appearing tissue and blood and fluid that was evacuated using ultrasound.    Cystoscopy was inadquate. Large amount of debris within the bladder even after irrigation and drainage several times still inadequate visualization. As in clinic the large volume urinary leakage all coming from the urethra with no evidence of drainage from the vagina to suggest fistula.    Complications: None apparent    Implants: None     Indication: Alis Hartman is a 68 year old with history of pelvic radiation for a remote history of cervical cancer who presented with worsening urinary frequency and leakage and had imaging showing uterus distended with fluid. She was  lost to follow-up and returned about 1 year later with worsening symptoms and was found to have further increase in the fluid within the uterus with tissue density on imaging. There was no blood flow within the tissue on ultrasound. Of note she also had continuous bladder leakage and new elevated creatinine.    Description of procedure:  The patient was taken to the OR. Anesthesia as above was administered. She was positioned in dorsal lithotomy in stirrups. Exam revealed findings above. She was prepped and draped. The vagina was bluntly opened until the uterus was reached. There was drainage of dark red blood and large amounts of tissue. This was further evacuated using a sharp curette and suction. The tissue was sent for pathology. Additional curettage of the endometrium was performed until there was a gritty texture noted. Tissue was all sent. There was minimal tissue with curettage.     The bladder was drained and then filled with the cystoscope. Visualization was very poor due to significant debris within the bladder. Thus the bladder was drained and filled a few times to attempt to irrigate. Some of this fluid was sent for cytology. The bladder was again filled with the cystoscope but visualization was still quite poor thus we abandoned our attempts. Immediate leakage was noted from the urethra with removal of the scope consistent with prior exam. There was no evidence of leakage within the vagina to suggest urinary fistula. As in clinic the urinary leakage was all coming from the urethra. Procedure concluded. No complications.    Dany Perez MD   Gynecologic Oncology

## 2024-05-17 NOTE — ANESTHESIA PREPROCEDURE EVALUATION
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]   Procedure : Procedure(s):  Exam under anesthesia, vaginal biopsies, laser ablation of the vagina  BIOPSY, VAGINA     Past Medical History:   Diagnosis Date    Atrial fibrillation (H)     Depression     Hypertension     Malignant neoplasm of endocervix (H)     Tx with radiation    Other chronic pain     Low back    Urinary incontinence       Past Surgical History:   Procedure Laterality Date    ABDOMINOPLASTY      BIOPSY CERVICAL, LOCAL EXCISION, SINGLE/MULTIPLE  10/26/2011    Procedure:BIOPSY CERVICAL, LOCAL EXCISION, SINGLE/MULTIPLE; EUA, cervical biopsies; Surgeon:BETTY TINEO; Location:[facility] OR    BIOPSY VAGINAL N/A 2021    Procedure: BIOPSY, VAGINA;  Surgeon: Dany Perez MD;  Location: [facility] OR    EXAM UNDER ANESTHESIA PELVIC N/A 3/12/2020    Procedure: Exam under anesthsia, vaginal biopsies, possible CO2 laser of the vagina;  Surgeon: Lilliam Roy MD;  Location: [facility] OR    GI SURGERY      gastric bypass    LASER CO2 VAGINA N/A 3/2/2015    Procedure: LASER CO2 VAGINA;  Surgeon: Mariela Abdalla MD;  Location: [facility] OR    LASER CO2 VAGINA N/A 2019    Procedure: Exam under anesthesia, vaginal biopsies, CO2 laser of the vagina;  Surgeon: Lilliam Roy MD;  Location: [facility] OR    LASER CO2 VAGINA N/A 2021    Procedure: Exam under anesthesia, laser ablation of the upper vagina;  Surgeon: Dany Perez MD;  Location: [facility] OR      Allergies   Allergen Reactions    Ibuprofen Nausea and Vomiting    Shrimp     Sulfa Antibiotics Rash      Social History     Tobacco Use    Smoking status: Never    Smokeless tobacco: Never   Substance Use Topics    Alcohol use: Not Currently      Wt Readings from Last 1 Encounters:   05/15/24 75.3 kg (166 lb 1.6 oz)        Anesthesia Evaluation            ROS/MED  "HX  ENT/Pulmonary:  - neg pulmonary ROS     Neurologic:  - neg neurologic ROS     Cardiovascular: Comment: LE edema - neg cardiovascular ROS   (+)  hypertension- -   -  - -                                      METS/Exercise Tolerance:     Hematologic:  - neg hematologic  ROS     Musculoskeletal:  - neg musculoskeletal ROS (+)  arthritis,             GI/Hepatic:  - neg GI/hepatic ROS     Renal/Genitourinary:  - neg Renal ROS     Endo:  - neg endo ROS   (+)               Obesity,       Psychiatric/Substance Use:  - neg psychiatric ROS     Infectious Disease:  - neg infectious disease ROS     Malignancy:  - neg malignancy ROS (+) Malignancy,     Other:  - neg other ROS    (+)  , H/O Chronic Pain,         Physical Exam    Airway  airway exam normal      Mallampati: II   TM distance: > 3 FB   Neck ROM: full   Mouth opening: > 3 cm    Respiratory Devices and Support         Dental  no notable dental history         Cardiovascular          Rhythm and rate: regular and normal     Pulmonary   pulmonary exam normal        breath sounds clear to auscultation           OUTSIDE LABS:  CBC:   Lab Results   Component Value Date    WBC 10.2 05/15/2024    WBC 3.9 (L) 01/26/2015    HGB 7.9 (LL) 05/15/2024    HGB 11.9 01/26/2015    HCT 27.7 (L) 05/15/2024    HCT 37.5 01/26/2015     (H) 05/15/2024     01/26/2015     BMP:   Lab Results   Component Value Date     05/15/2024     01/26/2015    POTASSIUM 3.8 05/15/2024    POTASSIUM 4.3 05/16/2019    CHLORIDE 105 05/15/2024    CHLORIDE 106 01/26/2015    CO2 25 05/15/2024    CO2 31 01/26/2015    BUN 16.6 05/15/2024    BUN 8 01/26/2015    CR 1.24 (H) 05/15/2024    CR 1.4 (H) 05/03/2024     (H) 05/15/2024    GLC 92 05/16/2019     COAGS: No results found for: \"PTT\", \"INR\", \"FIBR\"  POC: No results found for: \"BGM\", \"HCG\", \"HCGS\"  HEPATIC: No results found for: \"ALBUMIN\", \"PROTTOTAL\", \"ALT\", \"AST\", \"GGT\", \"ALKPHOS\", \"BILITOTAL\", \"BILIDIRECT\", \"STEWART\"  OTHER:   Lab " Results   Component Value Date    SAMUEL 9.0 05/15/2024    MAG 2.0 05/15/2024       Anesthesia Plan    ASA Status:  3    NPO Status:  NPO Appropriate    Anesthesia Type: MAC.     - Reason for MAC: immobility needed, straight local not clinically adequate   Induction: Propofol.   Maintenance: TIVA.        Consents    Anesthesia Plan(s) and associated risks, benefits, and realistic alternatives discussed. Questions answered and patient/representative(s) expressed understanding.     - Discussed:     - Discussed with:  Patient      - Extended Intubation/Ventilatory Support Discussed: No.      - Patient is DNR/DNI Status: No     Use of blood products discussed: No .     Postoperative Care    Pain management: Oral pain medications, IV analgesics, Multi-modal analgesia.   PONV prophylaxis: Ondansetron (or other 5HT-3), Background Propofol Infusion     Comments:                Christopher J. Behrens, MD

## 2024-05-20 ENCOUNTER — PATIENT OUTREACH (OUTPATIENT)
Dept: ONCOLOGY | Facility: CLINIC | Age: 69
End: 2024-05-20
Payer: COMMERCIAL

## 2024-05-20 LAB
PATH REPORT.COMMENTS IMP SPEC: NORMAL
PATH REPORT.FINAL DX SPEC: NORMAL
PATH REPORT.GROSS SPEC: NORMAL
PATH REPORT.MICROSCOPIC SPEC OTHER STN: NORMAL
PATH REPORT.RELEVANT HX SPEC: NORMAL

## 2024-05-20 NOTE — PROGRESS NOTES
[facility], Gynecologic Oncology: Post-op call    Surgery Date:  5/17/24    Description of Surgery:  D&C of the uterus with drainage of uterine fluid under ultrasound guidance; lysis of vaginal adhesions; cystoscopy.    Pain:  1) Location: vaginal area  2) Rate pain on scale 1-10: 6 or 7  3) Is your pain well controlled on your pain medication?: She has been taking Tylenol sporadically, and using some oxycodone PRN.  She was unable to say for sure how often.  Encouraged her to try alternating these medications to provide better pain relief - she verbalized understanding.  She will call back if pain is not improving or if it is worsening.    4) How often are you taking your pain medication?: See above.    GI:  5) Last bowel movement: Today - she has had a couple of bowel movements today, and states her stools are on the looser side at the moment.    6) Are you having regular bowel movements?: Yes  7) Eating/drinking well?: Yes  8) Nausea/vomiting?: No    Urinary:  9) Are you having problems or difficulty with urination?: She is still leaking urine - however, she doesn't think it's as bad as it it was before the surgery.      Lower Extremities:  10) Were lymph nodes removed during surgery?: No  11) If yes, have you been offered a lymphedema consult appointment?: N/A  12) Any pain, redness, or swelling in legs?: No  13) Any area on your legs that are warm to touch?: No  14) Chest pain or severe shortness of breath?: No    Wound:  15) Type of incision: N/A  Any of the following:     - Drainage (color, amount): None  - Odor: None  - Redness: N/A  - Chills: No  - Fever: No  16) Vaginal bleeding or discharge?: She had some small amounts of vaginal bleeding that stopped yesterday.  No vaginal discharge or bleeding today.  17) Staples - Have you had your staples out yet? (staples should be removed 7-10 days post-op): N/A  18) Patient was reminded to wash incision (allow warm, soapy water to run over incision): N/A -  no incisions present.    Post-op:  19) Verify date and time of appointment: 5/28/24 at 11:30 am with Dr. Perez ([facility]).  20) Patient was informed that pathology will be discussed at this appointment: Yes    Any other questions or concerns at this time?: No    Note routed to Dr. Perez to provide updates from patient.    Nicholas Jarrell, RN, BSN, OCN  RN Care Coordinator - Oncology  [facility]

## 2024-05-22 LAB
PATH REPORT.COMMENTS IMP SPEC: ABNORMAL
PATH REPORT.COMMENTS IMP SPEC: YES
PATH REPORT.FINAL DX SPEC: ABNORMAL
PATH REPORT.GROSS SPEC: ABNORMAL
PATH REPORT.RELEVANT HX SPEC: ABNORMAL
PATHOLOGY SYNOPTIC REPORT: ABNORMAL
PHOTO IMAGE: ABNORMAL

## 2024-05-23 ENCOUNTER — TELEPHONE (OUTPATIENT)
Dept: ONCOLOGY | Facility: CLINIC | Age: 69
End: 2024-05-23
Payer: COMMERCIAL

## 2024-05-28 ENCOUNTER — ONCOLOGY VISIT (OUTPATIENT)
Dept: ONCOLOGY | Facility: CLINIC | Age: 69
End: 2024-05-28
Attending: OBSTETRICS & GYNECOLOGY
Payer: COMMERCIAL

## 2024-05-28 VITALS
SYSTOLIC BLOOD PRESSURE: 163 MMHG | DIASTOLIC BLOOD PRESSURE: 84 MMHG | HEIGHT: 63 IN | WEIGHT: 161 LBS | OXYGEN SATURATION: 98 % | BODY MASS INDEX: 28.53 KG/M2 | HEART RATE: 75 BPM

## 2024-05-28 DIAGNOSIS — G89.18 ACUTE POST-OPERATIVE PAIN: Primary | ICD-10-CM

## 2024-05-28 DIAGNOSIS — C54.9 SEROUS CARCINOMA OF BODY OF UTERUS (H): ICD-10-CM

## 2024-05-28 PROCEDURE — 99214 OFFICE O/P EST MOD 30 MIN: CPT | Performed by: OBSTETRICS & GYNECOLOGY

## 2024-05-28 PROCEDURE — G0463 HOSPITAL OUTPT CLINIC VISIT: HCPCS | Performed by: OBSTETRICS & GYNECOLOGY

## 2024-05-28 ASSESSMENT — PAIN SCALES - GENERAL: PAINLEVEL: MILD PAIN (3)

## 2024-05-28 NOTE — LETTER
5/28/2024         RE: Alis Hartman  [address]  [place] [address]        Dear Colleague,    Thank you for referring your patient, Alis Hartman, to the [facility]. Please see a copy of my visit note below.    Gynecologic Oncology Clinic - Established Patient Visit    Visit date: May 28, 2024     Reason for visit: pathology review and treatment planning, uterine cancer    Interval history: Aranza is a 68 year old with depression, HTN, chronic pain with opioid dependence, gastric bypass, paranoid schizophrenia, remote history of cervical cancer treated with chemo-radiation in the 1990s and history of VaIN treated with LASER who more recently was found to have fluid distended uterus. After a period of loss to follow-up she recently underwent exam under anesthesia with lysis of vaginal adhesions, with D&C and drainage of uterine fluid. 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     CT scan didn't show areas of concern for metastatic disease. She has severe urinary incontinence likely secondary to combination of overflow incontinence (possibly exacerbated by medications) as well as urethral insuficiency (based on constant urinary leakage from the urethra on exam).    Urinary incontinence has improved somewhat since her procedure.    Past Surgical History:  Past Surgical History:   Procedure Laterality Date     ABDOMINOPLASTY       BIOPSY CERVICAL, LOCAL EXCISION, SINGLE/MULTIPLE  10/26/2011    Procedure:BIOPSY CERVICAL, LOCAL EXCISION, SINGLE/MULTIPLE; EUA, cervical biopsies; Surgeon:BETTY TINEO; [facility]     BIOPSY VAGINAL N/A 6/8/2021    Procedure: BIOPSY, VAGINA;  Surgeon: Dany Perez MD;  [facility]: [facility]     CYSTOSCOPY N/A 5/17/2024    Procedure: Cystoscopy;  Surgeon: Dany Peerz MD;  [facility]     DILATION AND CURETTAGE, WITH ULTRASOUND GUIDANCE N/A 5/17/2024    Procedure: Dilation and curettage of the uterus  "with drainage of uterine fluid under ultrasound guidance, lysis of vaginal adhesions;  Surgeon: Dany Perez MD;  [facility]     EXAM UNDER ANESTHESIA PELVIC N/A 3/12/2020    Procedure: Exam under anesthsia, vaginal biopsies, possible CO2 laser of the vagina;  Surgeon: Lilliam Roy MD;  [facility]     GI SURGERY  2004    gastric bypass     LASER CO2 VAGINA N/A 3/2/2015    Procedure: LASER CO2 VAGINA;  Surgeon: Mariela Abdalla MD;  [facility]     LASER CO2 VAGINA N/A 5/24/2019    Procedure: Exam under anesthesia, vaginal biopsies, CO2 laser of the vagina;  Surgeon: Lilliam Roy MD;  [facility]     LASER CO2 VAGINA N/A 6/8/2021    Procedure: Exam under anesthesia, laser ablation of the upper vagina;  Surgeon: Dany Perez MD;  [facility]        Physical Exam:  BP (!) 163/84 (BP Location: Right arm, Patient Position: Chair, Cuff Size: Adult Regular)   Pulse 75   Ht 1.6 m (5' 3\")   Wt 73 kg (161 lb)   SpO2 98%   BMI 28.52 kg/m     General appearance: no acute distress, well groomed, sitting comfortably    Imaging: personally reviewed CT images. There is a calcified area in the left which is read as calcified perirectal lymph node but which could also representa calcified ovarian lesion.       Pathology:  Surgical Pathology Report                         Case: [id number]                                   Authorizing Provider:  Dany Perez MD         Collected:           05/17/2024 12:04 PM           [facility]ing Location:     [facility]                 Received:            05/17/2024 01:21 PM           Pathologist:           Marleni Paul MD                                                           Specimen:    Endometrium, endometrial curretings                                                        Final Diagnosis   A. endometrial curretings:  -Endometrial serous carcinoma with extensive necrosis   Electronically signed by Marleni Paul MD on " 5/22/2024 at 10:15 AM   Comment  [facility]   The carcinoma cells are positive for p16, negative for HER2 (0-1+, 10%).  TP53 is a mutant pattern.  IHC stains revealed intact MMR protein expression.  Please see biomarker report for the detail.   Synoptic Checklist   Gynecologic Biomarker Reporting Template   Protocol posted: 3/22/2023GYNECOLOGIC BIOMARKER REPORTING TEMPLATE - All Specimens  TEST(S) PERFORMED   Specimen Type  Biopsy / curettage   Appropriate Controls Verified  Yes        HER2 Status  Negative (score 1+)        Mismatch Repair (MMR) Protein Status     Nuclear MLH1 Expression  Intact   Mismatch Repair (MMR) Protein Status     Nuclear PMS2 Expression  Intact   Mismatch Repair (MMR) Protein Status     Nuclear MSH2 Expression  Intact   Mismatch Repair (MMR) Protein Status     Nuclear MSH6 Expression  Intact   Mismatch Repair (MMR) Protein Status  Background non-neoplastic tissue / internal control shows intact nuclear expression   Immunohistochemistry (IHC) Interpretation for Mismatch Repair (MMR) Proteins  No loss of nuclear expression of MMR proteins: low probability of microsatellite instability-high (MSI-H) phenotype        p53 Status  Abnormal expression (mutated)     Overexpression (strong, diffuse nuclear expression in greater than 90% of cells)          Assessment:  Aranza is a 68 year old with newly diagnosed atrial fibrillation (paroxysmla), depression, HTN, chronic pain with opioid dependence, gastric bypass, paranoid schizophrenia, remote history of cervical cancer treated with chemo-radiation in the 1990s and history of VaIN treated with LASER who more recently was found to have fluid distended uterus with D&C showing uterine serous carcinoma.    Plan:  -Uterine serous carcinoma: reviewed this diagnosis. Treatment recommendations include total hysterectomy, bilateral salpingo-oophorectomy. Generally lymph node assessment is completed for prognostication, but in her case, the surgery would be  complicated due to history of pelvic radiation. Because systemic therapy will be recommended regardless of lymph node involvement, I would favor hysterectomy without addition of retroperitoneal dissection to decrease risks of surgery.   - We had an extensive discussion about the risks of surgery in the setting of history of radiation therapy. The biggest concern is for bladder injury and also concern for bowel injury. We discussed it's very likely her surgery would require open approach. It is reasonable to start with laparoscopic assessment in the small likelihood that laparoscopic surgery could be accomplished, but I think open surgery is quite likely.   -Imaging with quang-rectal calcified lesion (no change in long interval imaging, so unliekly related to malignancy), this could be adnexal in origin. If safely able to access this during surgery, we will plan for surgical resection.   - Plan for surgical resection and adjuvant chemotherapy provided diagnosis is confirmed given inability to assess the lymph nodes safely      Acute kidney injury: likely due to hydroureteronephrosis from urinary retention. Monitor creatinine.     Atrial fibrillation, decreased exercise tolerance: plan to complete cardiac work-up prior to surgery. Has not started oral anticoagulant. Prefer she defers this to after surgery if possible. If needed prior, hold 2 days pre-operatively.     In addition to the time spent on routine post-op care, I spent a total of 25 minutes on the date of service related to counseling and formulation of a plan related to disease management that was unrelated to the post-op care. The longitudinal plan of care for the diagnosis(es)/condition(s) as documented were addressed during this visit. Due to the added complexity in care, I will continue to support Aranza in the subsequent management and with ongoing continuity of care.    Dany Perez MD     Gynecologic Oncology       Again, thank  you for allowing me to participate in the care of your patient.        Sincerely,        Dany Perez MD

## 2024-05-28 NOTE — NURSING NOTE
"Oncology Rooming Note    May 28, 2024 12:03 PM   Alis Hartman is a 68 year old female who presents for:    Chief Complaint   Patient presents with    Oncology Clinic Visit     Post-Op     Initial Vitals: BP (!) 163/84 (BP Location: Right arm, Patient Position: Chair, Cuff Size: Adult Regular)   Pulse 75   Ht 1.6 m (5' 3\")   Wt 73 kg (161 lb)   SpO2 98%   BMI 28.52 kg/m   Estimated body mass index is 28.52 kg/m  as calculated from the following:    Height as of this encounter: 1.6 m (5' 3\").    Weight as of this encounter: 73 kg (161 lb). Body surface area is 1.8 meters squared.  Mild Pain (3) Comment: Data Unavailable   No LMP recorded. Patient is postmenopausal.  Allergies reviewed: Yes  Medications reviewed: Yes    Medications: Medication refills not needed today.  Pharmacy name entered into [employer]:    [id number] [place], [place] - [address] [facility]  [id number] [address] [id number] [address] [id number] [address] AT [facility] [address]    Frailty Screening:   Is the patient here for a new oncology consult visit in cancer care? 2. No      Clinical concerns: Yes       Jewels Stone CMA              "

## 2024-05-29 ENCOUNTER — PATIENT OUTREACH (OUTPATIENT)
Dept: ONCOLOGY | Facility: CLINIC | Age: 69
End: 2024-05-29

## 2024-05-29 NOTE — PROGRESS NOTES
[facility]: Cancer Care Note                                                          Received voicemail message from patient this morning, calling to inquire about a refill that she thought Dr. Perez was going to send in to her pharmacy.     Writer placed callback to patient this afternoon to discuss.  Patient states she was under the impression that Dr. Perez was planning to send in a prescription for pain medication for her - patient believes it may have been Percocet 10/325 mg tablets.  She would like to have this sent to the [facility] in [place], if possible.    Note routed to Dr. Perez with patient's request.    Patient also apologized for not being able to stay in clinic yesterday for pre-op education - she states she couldn't stay because her ride was waiting for her.  Provided reassurance to patient that we will mail her a new surgery packet, and that once her surgery is scheduled writer will plan to call her with pre-op education and to reinforce prep instructions as well.  Patient verbalized understanding, and stated agreement with this plan.    Nicholas Jarrell, RN, BSN, OCN  RN Care Coordinator - Oncology  [facility]

## 2024-05-30 RX ORDER — OXYCODONE HYDROCHLORIDE 5 MG/1
10 TABLET ORAL EVERY 6 HOURS PRN
Qty: 30 TABLET | Refills: 0 | Status: ON HOLD | OUTPATIENT
Start: 2024-05-30 | End: 2024-07-16

## 2024-05-30 NOTE — PROGRESS NOTES
[facility]: Cancer Care Note                                                          Telephone call placed to patient this afternoon, informing her that Dr. Perez has sent in a prescription for oxycodone 10 mg tablets to her pharmacy this afternoon.  Asked patient to let us know if she has any difficulties picking up the prescription from her pharmacy.  Patient verbalized understanding.      Nicholas Jarrell, RN, BSN, OCN  RN Care Coordinator - Oncology  [facility]

## 2024-06-03 RX ORDER — METRONIDAZOLE 500 MG/100ML
500 INJECTION, SOLUTION INTRAVENOUS
Status: CANCELLED | OUTPATIENT
Start: 2024-06-03

## 2024-06-03 RX ORDER — HEPARIN SODIUM 10000 [USP'U]/ML
5000 INJECTION, SOLUTION INTRAVENOUS; SUBCUTANEOUS
Status: CANCELLED | OUTPATIENT
Start: 2024-06-03

## 2024-06-03 NOTE — PROGRESS NOTES
Gynecologic Oncology Clinic - Established Patient Visit    Visit date: May 28, 2024     Reason for visit: pathology review and treatment planning, uterine cancer    Interval history: Aranza is a 68 year old with depression, HTN, chronic pain with opioid dependence, gastric bypass, paranoid schizophrenia, remote history of cervical cancer treated with chemo-radiation in the 1990s and history of VaIN treated with LASER who more recently was found to have fluid distended uterus. After a period of loss to follow-up she recently underwent exam under anesthesia with lysis of vaginal adhesions, with D&C and drainage of uterine fluid. 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     CT scan didn't show areas of concern for metastatic disease. She has severe urinary incontinence likely secondary to combination of overflow incontinence (possibly exacerbated by medications) as well as urethral insuficiency (based on constant urinary leakage from the urethra on exam).    Urinary incontinence has improved somewhat since her procedure.    Past Surgical History:  Past Surgical History:   Procedure Laterality Date    ABDOMINOPLASTY      BIOPSY CERVICAL, LOCAL EXCISION, SINGLE/MULTIPLE  10/26/2011    Procedure:BIOPSY CERVICAL, LOCAL EXCISION, SINGLE/MULTIPLE; EUA, cervical biopsies; Surgeon:BETTY TINEO; [facility]    BIOPSY VAGINAL N/A 6/8/2021    Procedure: BIOPSY, VAGINA;  Surgeon: Dany Perez MD;  [facility]: [facility]    CYSTOSCOPY N/A 5/17/2024    Procedure: Cystoscopy;  Surgeon: Dany Perez MD;  [facility]    DILATION AND CURETTAGE, WITH ULTRASOUND GUIDANCE N/A 5/17/2024    Procedure: Dilation and curettage of the uterus with drainage of uterine fluid under ultrasound guidance, lysis of vaginal adhesions;  Surgeon: Dany Perez MD;  [facility]    EXAM UNDER ANESTHESIA PELVIC N/A 3/12/2020    Procedure: Exam under anesthsia, vaginal biopsies, possible CO2 laser of the vagina;  Surgeon:  "Lilliam Roy MD;  [facility]    GI SURGERY  2004    gastric bypass    LASER CO2 VAGINA N/A 3/2/2015    Procedure: LASER CO2 VAGINA;  Surgeon: Mariela Abdalla MD;  Location:  OR    LASER CO2 VAGINA N/A 5/24/2019    Procedure: Exam under anesthesia, vaginal biopsies, CO2 laser of the vagina;  Surgeon: Lilliam Roy MD;  Location:  OR    LASER CO2 VAGINA N/A 6/8/2021    Procedure: Exam under anesthesia, laser ablation of the upper vagina;  Surgeon: Dany Perez MD;  Location:  OR        Physical Exam:  BP (!) 163/84 (BP Location: Right arm, Patient Position: Chair, Cuff Size: Adult Regular)   Pulse 75   Ht 1.6 m (5' 3\")   Wt 73 kg (161 lb)   SpO2 98%   BMI 28.52 kg/m     General appearance: no acute distress, well groomed, sitting comfortably    Imaging: personally reviewed CT images. There is a calcified area in the left which is read as calcified perirectal lymph node but which could also representa calcified ovarian lesion.       Pathology:  Surgical Pathology Report                         Case: [id number]                                   Authorizing Provider:  Dany Perez MD         Collected:           05/17/2024 12:04 PM           Ordering Location:     [facility] OR                 Received:            05/17/2024 01:21 PM           Pathologist:           Marleni Paul MD                                                           Specimen:    Endometrium, endometrial curretings                                                        Final Diagnosis   A. endometrial curretings:  -Endometrial serous carcinoma with extensive necrosis   Electronically signed by Marleni Paul MD on 5/22/2024 at 10:15 AM   Comment  [facility]   The carcinoma cells are positive for p16, negative for HER2 (0-1+, 10%).  TP53 is a mutant pattern.  IHC stains revealed intact MMR protein expression.  Please see biomarker report for the detail.   Synoptic Checklist   Gynecologic Biomarker " Reporting Template   Protocol posted: 3/22/2023GYNECOLOGIC BIOMARKER REPORTING TEMPLATE - All Specimens  TEST(S) PERFORMED   Specimen Type  Biopsy / curettage   Appropriate Controls Verified  Yes        HER2 Status  Negative (score 1+)        Mismatch Repair (MMR) Protein Status     Nuclear MLH1 Expression  Intact   Mismatch Repair (MMR) Protein Status     Nuclear PMS2 Expression  Intact   Mismatch Repair (MMR) Protein Status     Nuclear MSH2 Expression  Intact   Mismatch Repair (MMR) Protein Status     Nuclear MSH6 Expression  Intact   Mismatch Repair (MMR) Protein Status  Background non-neoplastic tissue / internal control shows intact nuclear expression   Immunohistochemistry (IHC) Interpretation for Mismatch Repair (MMR) Proteins  No loss of nuclear expression of MMR proteins: low probability of microsatellite instability-high (MSI-H) phenotype        p53 Status  Abnormal expression (mutated)     Overexpression (strong, diffuse nuclear expression in greater than 90% of cells)          Assessment:  Aranza is a 68 year old with newly diagnosed atrial fibrillation (paroxysmla), depression, HTN, chronic pain with opioid dependence, gastric bypass, paranoid schizophrenia, remote history of cervical cancer treated with chemo-radiation in the 1990s and history of VaIN treated with LASER who more recently was found to have fluid distended uterus with D&C showing uterine serous carcinoma.    Plan:  -Uterine serous carcinoma: reviewed this diagnosis. Treatment recommendations include total hysterectomy, bilateral salpingo-oophorectomy. Generally lymph node assessment is completed for prognostication, but in her case, the surgery would be complicated due to history of pelvic radiation. Because systemic therapy will be recommended regardless of lymph node involvement, I would favor hysterectomy without addition of retroperitoneal dissection to decrease risks of surgery.   - We had an extensive discussion about the risks of  surgery in the setting of history of radiation therapy. The biggest concern is for bladder injury and also concern for bowel injury. We discussed it's very likely her surgery would require open approach. It is reasonable to start with laparoscopic assessment in the small likelihood that laparoscopic surgery could be accomplished, but I think open surgery is quite likely.   -Imaging with quang-rectal calcified lesion (no change in long interval imaging, so unliekly related to malignancy), this could be adnexal in origin. If safely able to access this during surgery, we will plan for surgical resection.   - Plan for surgical resection and adjuvant chemotherapy provided diagnosis is confirmed given inability to assess the lymph nodes safely      Acute kidney injury: likely due to hydroureteronephrosis from urinary retention. Monitor creatinine.     Atrial fibrillation, decreased exercise tolerance: plan to complete cardiac work-up prior to surgery. Has not started oral anticoagulant. Prefer she defers this to after surgery if possible. If needed prior, hold 2 days pre-operatively.     In addition to the time spent on routine post-op care, I spent a total of 25 minutes on the date of service related to counseling and formulation of a plan related to disease management that was unrelated to the post-op care. The longitudinal plan of care for the diagnosis(es)/condition(s) as documented were addressed during this visit. Due to the added complexity in care, I will continue to support Aranza in the subsequent management and with ongoing continuity of care.    Dany Perez MD     Gynecologic Oncology

## 2024-06-04 ENCOUNTER — TELEPHONE (OUTPATIENT)
Dept: ONCOLOGY | Facility: CLINIC | Age: 69
End: 2024-06-04
Payer: COMMERCIAL

## 2024-06-04 NOTE — TELEPHONE ENCOUNTER
Spoke with patient, confirmed all scheduled information.     Patient is schedule for surgery with: Dr. Perez    Surgery Date: 7/12/2024     Location: [facility]    H&P: to be completed by surgeon will complete and/or update on day of surgery as needed      Post-op: will be scheduled by the clinic    Patient will receive a phone call from hospital pre-admission nurses 1-2 days prior to surgery with arrival time and NPO instructions. Patient aware times are subject to change up until day before surgery.     Patient questions/concerns: N/A     Surgery packet to be sent via US mail      Judd Londono on 6/4/2024 at 2:47 PM

## 2024-06-04 NOTE — TELEPHONE ENCOUNTER
Called and spoke with patient regarding scheduling surgery with Dr. Perez.    Patient was offered 6/21/24 and declined. Patient prefers 7/12/24 if possible; awaiting response/approval from Dr. Perez. Writer stated they will call patient when response is received. Patient confirmed understanding.    Judd Londono on 6/4/2024 at 10:43 AM

## 2024-06-11 ENCOUNTER — PATIENT OUTREACH (OUTPATIENT)
Dept: ONCOLOGY | Facility: CLINIC | Age: 69
End: 2024-06-11
Payer: COMMERCIAL

## 2024-06-11 NOTE — PROGRESS NOTES
[facility]: Cancer Care Note                                                          MyChart message was sent to patient this afternoon, providing her with surgery information and handouts - in preparation for her upcoming surgery with Dr. Perez on 7/12/24 (Diagnostic laparoscopy, possible total laparoscopic hysterectomy, bilateral salpingo-oophorectomy, possible conversion to open surgery).    Advised patient that writer will plan to call her to review the information via telephone when it gets closer to her surgery date.      Encouraged patient to reach out if she has any questions or concerns in the meantime.    Also reminded patient that she will need to have her Lexiscan completed prior to surgery (this is scheduled for 6/19/24 currently).  Per Dr. Perez, no additional pre-op exam will be needed as long as patient completes her necessary cardiac testing/workup prior to surgery.    Nicholas Jarrell, RN, BSN, OCN  RN Care Coordinator - Oncology  [facility]

## 2024-07-05 ENCOUNTER — PATIENT OUTREACH (OUTPATIENT)
Dept: ONCOLOGY | Facility: CLINIC | Age: 69
End: 2024-07-05
Payer: COMMERCIAL

## 2024-07-05 NOTE — PROGRESS NOTES
[facility]: Cancer Care Follow-Up Note                                    Situation:                                                      Writer attempted reaching patient via telephone this afternoon, to review pre-op education for her upcoming surgery with Dr. Perez on 7/12/24 (Diagnostic laparoscopy, possible total laparoscopic hysterectomy, bilateral salpingo-oophorectomy, possible conversion to open surgery).      Surgery handouts and medication instructions were sent to patient via [employer] previously.     Assessment:                                                      Patient did not answer telephone call at this time.  Voicemail message was left, requesting callback.  Direct phone number for this RN was provided in message.      Plan:                                                       If no callback is received from patient by Monday next week, writer will plan to try reaching out to patient again that day.      Also noted patient was supposed to have completed her echocardiogram and Lexiscan test on 7/1/24, but she did not show for the testing.  Our scheduling team was able to connect with patient earlier today, and assisted her with rescheduling the appointments for 7/10/24.  However, since this will be just 2 days before her planned surgery, a message was sent to Dr. Perez to verify if this will be acceptable.      Future Appointments   Date Time Provider Department Center   7/10/2024 10:00 AM [id number] [facility] O   7/10/2024 12:00 PM [id number] [facility] O   7/30/2024  1:30 PM Dany Perez MD [facility]   8/20/2024  9:30 AM Robin Herrera MD [facility]     Nicholas Jarrell, RN, BSN, OCN  RN Care Coordinator - Oncology  [facility]

## 2024-07-08 NOTE — PROGRESS NOTES
[facility]: Cancer Care Note                                                          Attempted reaching patient via telephone this afternoon, to review surgery education.  Patient did not answer, second voicemail message was left requesting that patient return call.  Direct phone number for this RN was provided in message.    Writer also received response from Dr. Perez, in regards to patient's echocardiogram and Lexiscan appointments which were moved to 7/10/24 (2 days before surgery) as patient missed her previous appointments.  Dr. Perez states the timing of these tests is fine, as long as patient has them completed before surgery.      Nicholas Jarrell, RN, BSN, OCN  RN Care Coordinator - Oncology  [facility]

## 2024-07-09 NOTE — PROGRESS NOTES
"[facility]: Cancer Care Note                              Discussion with Patient:                                                      RN was able to speak with patient via telephone this afternoon to review pre-op education prior to her upcoming surgery with Dr. Perez.  See below Assessment section for details.    Assessment:                                                      Pre/Post Procedure:   Surgery/Procedure plan reviewed with patient  Planned Surgery or Procedure: Diagnostic laparoscopy, possible total laparoscopic hysterectomy, bilateral salpingo-oophorectomy, possible conversion to open surgery.  Anesthesia Type: general anesthesia  Relevant Diagnosis: Serous carcinoma of body of uterus  Preoperative Surgical Consult Date: 05/28/24  Pre-Op Physical to be completed by (e.g.: PCP, Pre-Assessment Center, etc.):: Surgeon (Dr. Perez will update patient's H&P exam the morning of surgery as needed.)  Post-op Appointment Date: 07/30/24     Education  Person Taught: patient  Learning Readiness and Ability: no barriers identified  Pre-op Care Instructions: proper use of medications, when to take, and when to hold;when to call provider;pain management  Pre-op Infection Prevention Reviewed: pre-op CHG bathing instructions;bathing care after procedure  Pre-op Planning Reviewed:  arrangements, if indicated;how to get to procedure location;post-op support plan (\"who will help care for you after your surgery/procedure?\")  Pre-op Education/Instructions provided: how to prepare for surgery;what to expect on surgery day;surgery location specifics (map, parking, phone number);showering before surgery;eating before and after surgery  Post-op Care Instructions: proper use of medications, when to take, and when to hold;when to call provider;home care/follow-up care;pain management;bowel management;diet;sexual activity restriction;physical activity restriction;incision care/wound management  Education " Outcome Evaluation: acceptance expressed       Pre-op Checklist Reviewed  Labs: n/a  Chest X-Ray: n/a  EKG: n/a  Bowel Prep: n/a  Other (see comment):  (Patient has Cardiology testing scheduled for 7/10/24 (echocardiogram and Lexiscan), and she is aware of the appointments.)     Notes:  Also reinforced the following pre-operative medication recommendations per Dr. Perez, which were sent to patient via [employer] previously:    Stop taking vitamins, supplements, aspirin, ibuprofen, and naproxen starting 7 days before surgery (confirmed patient is not taking any of these medications).     Do not take these medications the morning of surgery:   Furosemide, hydrochlorothiazide, apixaban, Detrol, and Cogentin.    Plan:                                                       Patient will complete her Cardiology testing tomorrow (7/10/24).    Patient will plan to proceed with surgery on Friday this week (7/12/24) with Dr. Perez.    Patient's post-op appointment with Dr. Perez is scheduled for 7/30/24.    Patient verbalized understanding of all information and education that was provided to her, and stated agreement with plan.  Encouraged her to reach out with any questions or concerns.      Future Appointments   Date Time Provider Department Center   7/10/2024 10:00 AM [email or web address] [facility] O   7/10/2024 12:00 PM [email or web address] [facility] O   7/30/2024  1:30 PM Dany Perez MD [facility]   8/20/2024  9:30 AM Robin Herrera MD [facility]     Nicholas Jarrell, RN, BSN, OCN  RN Care Coordinator - Oncology  [facility]

## 2024-07-10 ENCOUNTER — HOSPITAL ENCOUNTER (OUTPATIENT)
Dept: CARDIOLOGY | Facility: CLINIC | Age: 69
Discharge: HOME | DRG: 740 | End: 2024-07-10
Attending: INTERNAL MEDICINE
Payer: COMMERCIAL

## 2024-07-10 ENCOUNTER — HOSPITAL ENCOUNTER (OUTPATIENT)
Dept: NUCLEAR MEDICINE | Facility: CLINIC | Age: 69
Setting detail: NUCLEAR MEDICINE
Discharge: HOME | DRG: 740 | End: 2024-07-10
Attending: INTERNAL MEDICINE
Payer: COMMERCIAL

## 2024-07-10 ENCOUNTER — HOSPITAL ENCOUNTER (OUTPATIENT)
Dept: NUCLEAR MEDICINE | Facility: CLINIC | Age: 69
Setting detail: NUCLEAR MEDICINE
Discharge: HOME | DRG: 740 | End: 2024-07-10
Attending: INTERNAL MEDICINE | Admitting: INTERNAL MEDICINE
Payer: COMMERCIAL

## 2024-07-10 DIAGNOSIS — N18.31 ANEMIA DUE TO STAGE 3A CHRONIC KIDNEY DISEASE (H): ICD-10-CM

## 2024-07-10 DIAGNOSIS — Z01.810 PRE-OPERATIVE CARDIOVASCULAR EXAMINATION: ICD-10-CM

## 2024-07-10 DIAGNOSIS — I48.0 PAROXYSMAL ATRIAL FIBRILLATION (H): ICD-10-CM

## 2024-07-10 DIAGNOSIS — I10 BENIGN ESSENTIAL HYPERTENSION: ICD-10-CM

## 2024-07-10 DIAGNOSIS — D75.839 THROMBOCYTOSIS: ICD-10-CM

## 2024-07-10 DIAGNOSIS — N18.31 STAGE 3A CHRONIC KIDNEY DISEASE (H): ICD-10-CM

## 2024-07-10 DIAGNOSIS — D63.1 ANEMIA DUE TO STAGE 3A CHRONIC KIDNEY DISEASE (H): ICD-10-CM

## 2024-07-10 LAB — LVEF ECHO: NORMAL

## 2024-07-10 PROCEDURE — 93017 CV STRESS TEST TRACING ONLY: CPT | Mod: 52

## 2024-07-10 PROCEDURE — 93018 CV STRESS TEST I&R ONLY: CPT | Mod: 52 | Performed by: INTERNAL MEDICINE

## 2024-07-10 PROCEDURE — 93306 TTE W/DOPPLER COMPLETE: CPT

## 2024-07-10 PROCEDURE — A9502 TC99M TETROFOSMIN: HCPCS | Performed by: INTERNAL MEDICINE

## 2024-07-10 PROCEDURE — 250N000011 HC RX IP 250 OP 636: Performed by: INTERNAL MEDICINE

## 2024-07-10 PROCEDURE — 93016 CV STRESS TEST SUPVJ ONLY: CPT | Performed by: INTERNAL MEDICINE

## 2024-07-10 PROCEDURE — 78452 HT MUSCLE IMAGE SPECT MULT: CPT | Mod: 52

## 2024-07-10 PROCEDURE — 93306 TTE W/DOPPLER COMPLETE: CPT | Mod: 26 | Performed by: INTERNAL MEDICINE

## 2024-07-10 PROCEDURE — 343N000001 HC RX 343: Performed by: INTERNAL MEDICINE

## 2024-07-10 PROCEDURE — 78452 HT MUSCLE IMAGE SPECT MULT: CPT | Mod: 26 | Performed by: RADIOLOGY

## 2024-07-10 RX ORDER — CAFFEINE CITRATE 20 MG/ML
60 SOLUTION INTRAVENOUS
Status: ACTIVE | OUTPATIENT
Start: 2024-07-10 | End: 2024-07-10

## 2024-07-10 RX ORDER — CAFFEINE 200 MG
200 TABLET ORAL
Status: SHIPPED | OUTPATIENT
Start: 2024-07-10 | End: 2024-07-10

## 2024-07-10 RX ORDER — REGADENOSON 0.08 MG/ML
0.4 INJECTION, SOLUTION INTRAVENOUS ONCE
Status: COMPLETED | OUTPATIENT
Start: 2024-07-10 | End: 2024-07-10

## 2024-07-10 RX ORDER — ALBUTEROL SULFATE 90 UG/1
2 AEROSOL, METERED RESPIRATORY (INHALATION) EVERY 5 MIN PRN
Status: DISCONTINUED | OUTPATIENT
Start: 2024-07-10 | End: 2024-07-11 | Stop reason: HOSPADM

## 2024-07-10 RX ORDER — AMINOPHYLLINE 25 MG/ML
50-100 INJECTION, SOLUTION INTRAVENOUS
Status: DISCONTINUED | OUTPATIENT
Start: 2024-07-10 | End: 2024-07-11 | Stop reason: HOSPADM

## 2024-07-10 RX ADMIN — REGADENOSON 0.4 MG: 0.08 INJECTION, SOLUTION INTRAVENOUS at 11:33

## 2024-07-10 RX ADMIN — TETROFOSMIN 10.04 MILLICURIE: 1.38 INJECTION, POWDER, LYOPHILIZED, FOR SOLUTION INTRAVENOUS at 10:24

## 2024-07-10 RX ADMIN — TETROFOSMIN 38.2 MILLICURIE: 1.38 INJECTION, POWDER, LYOPHILIZED, FOR SOLUTION INTRAVENOUS at 11:36

## 2024-07-10 NOTE — PROGRESS NOTES
Pt here for Lexiscan nuclear stress test. Medication and side effects reviewed with patient. Lung sounds clear to auscultation bilaterally. Denied caffeine use. Patient tolerated Lexiscan dose without any adverse reactions. VSS. Monitored post injection and then taken to nuclear medicine for follow up imaging.    
Never smoker

## 2024-07-11 ENCOUNTER — PATIENT OUTREACH (OUTPATIENT)
Dept: ONCOLOGY | Facility: CLINIC | Age: 69
End: 2024-07-11
Payer: COMMERCIAL

## 2024-07-11 ENCOUNTER — ANESTHESIA EVENT (OUTPATIENT)
Dept: SURGERY | Facility: CLINIC | Age: 69
DRG: 740 | End: 2024-07-11
Payer: COMMERCIAL

## 2024-07-11 NOTE — PROGRESS NOTES
[facility]: Cancer Care Note                                                          Received voicemail message from patient this afternoon, calling to follow up regarding her echocardiogram and Lexiscan tests from yesterday (7/10/24).  Patient wondering if Dr. Perez has had a chance to review the results yet (noted Lexiscan final report is still in process), and if she is still able to have her surgery tomorrow.    Reviewed with patient that her final echocardiogram results came in, and that Dr. Perez is aware. Also advised her that the Lexiscan test results are still in process, but that Dr. Perez was aware and planned to reach out to the Nuclear Medicine team to discuss.  Advised patient that she should plan to have surgery tomorrow as currently scheduled, and verified her arrival time of 10:40 am.    Patient did request to share a note with Dr. Perez that during a portion of her testing yesterday, while she was in the CT scanner, she became very nervous and had to stop early.  She wanted to make sure this wouldn't impact her ability to have surgery.  Advised patient writer doesn't believe this is the case, but a note will be sent over to Dr. Perez to advise.      Patient denied having any further questions or concerns at this time.      Nicholas Jarrell, RN, BSN, OCN  RN Care Coordinator - Oncology  [employer]

## 2024-07-12 ENCOUNTER — HOSPITAL ENCOUNTER (INPATIENT)
Facility: CLINIC | Age: 69
LOS: 5 days | Discharge: HOME | DRG: 740 | End: 2024-07-17
Attending: OBSTETRICS & GYNECOLOGY | Admitting: OBSTETRICS & GYNECOLOGY
Payer: COMMERCIAL

## 2024-07-12 ENCOUNTER — ANESTHESIA (OUTPATIENT)
Dept: SURGERY | Facility: CLINIC | Age: 69
DRG: 740 | End: 2024-07-12
Payer: COMMERCIAL

## 2024-07-12 DIAGNOSIS — N32.89 BLADDER SPASM: ICD-10-CM

## 2024-07-12 DIAGNOSIS — I10 HYPERTENSION, UNSPECIFIED TYPE: ICD-10-CM

## 2024-07-12 DIAGNOSIS — N95.2 ATROPHIC VAGINITIS: ICD-10-CM

## 2024-07-12 DIAGNOSIS — I48.0 PAROXYSMAL ATRIAL FIBRILLATION (H): ICD-10-CM

## 2024-07-12 DIAGNOSIS — C53.8 MALIGNANT NEOPLASM OF OVERLAPPING SITES OF CERVIX (H): ICD-10-CM

## 2024-07-12 DIAGNOSIS — Z90.710 S/P HYSTERECTOMY: ICD-10-CM

## 2024-07-12 DIAGNOSIS — Z96.0 PRESENCE OF INDWELLING URETHRAL CATHETER: ICD-10-CM

## 2024-07-12 DIAGNOSIS — Z93.59 SUPRAPUBIC CATHETER (H): Primary | ICD-10-CM

## 2024-07-12 LAB
ABO/RH(D): NORMAL
ANION GAP SERPL CALCULATED.3IONS-SCNC: 12 MMOL/L (ref 7–15)
ANTIBODY SCREEN: NEGATIVE
BASE EXCESS BLDA CALC-SCNC: 6.9 MMOL/L (ref -3–3)
BASE EXCESS BLDA CALC-SCNC: 7.2 MMOL/L (ref -3–3)
BLD PROD TYP BPU: NORMAL
BLD PROD TYP BPU: NORMAL
BLOOD COMPONENT TYPE: NORMAL
BLOOD COMPONENT TYPE: NORMAL
BUN SERPL-MCNC: 21.3 MG/DL (ref 8–23)
CA-I BLD-MCNC: 4.4 MG/DL (ref 4.4–5.2)
CA-I BLD-MCNC: 4.5 MG/DL (ref 4.4–5.2)
CALCIUM SERPL-MCNC: 8.4 MG/DL (ref 8.8–10.2)
CHLORIDE SERPL-SCNC: 93 MMOL/L (ref 98–107)
CODING SYSTEM: NORMAL
CODING SYSTEM: NORMAL
CREAT SERPL-MCNC: 1.64 MG/DL (ref 0.51–0.95)
CROSSMATCH: NORMAL
CROSSMATCH: NORMAL
CV STRESS MAX HR HE: 120
DEPRECATED HCO3 PLAS-SCNC: 29 MMOL/L (ref 22–29)
EGFRCR SERPLBLD CKD-EPI 2021: 33 ML/MIN/1.73M2
ERYTHROCYTE [DISTWIDTH] IN BLOOD BY AUTOMATED COUNT: 20.4 % (ref 10–15)
GLUCOSE BLD-MCNC: 119 MG/DL (ref 70–99)
GLUCOSE BLD-MCNC: 122 MG/DL (ref 70–99)
GLUCOSE BLDC GLUCOMTR-MCNC: 99 MG/DL (ref 70–99)
GLUCOSE SERPL-MCNC: 92 MG/DL (ref 70–99)
HCO3 BLDA-SCNC: 30 MMOL/L (ref 21–28)
HCO3 BLDA-SCNC: 31 MMOL/L (ref 21–28)
HCT VFR BLD AUTO: 25.4 % (ref 35–47)
HGB BLD-MCNC: 7.2 G/DL (ref 11.7–15.7)
HGB BLD-MCNC: 7.4 G/DL (ref 11.7–15.7)
HGB BLD-MCNC: 7.7 G/DL (ref 11.7–15.7)
ISSUE DATE AND TIME: NORMAL
LACTATE BLD-SCNC: 0.8 MMOL/L (ref 0.7–2)
LACTATE BLD-SCNC: 0.8 MMOL/L (ref 0.7–2)
MCH RBC QN AUTO: 21.4 PG (ref 26.5–33)
MCHC RBC AUTO-ENTMCNC: 30.3 G/DL (ref 31.5–36.5)
MCV RBC AUTO: 71 FL (ref 78–100)
O2/TOTAL GAS SETTING VFR VENT: 33 %
O2/TOTAL GAS SETTING VFR VENT: 42 %
OXYHGB MFR BLDA: 96 % (ref 92–100)
OXYHGB MFR BLDA: 96 % (ref 92–100)
OXYHGB MFR BLDA: 97 % (ref 92–100)
OXYHGB MFR BLDA: 97 % (ref 92–100)
PCO2 BLDA: 34 MM HG (ref 35–45)
PCO2 BLDA: 36 MM HG (ref 35–45)
PH BLDA: 7.53 [PH] (ref 7.35–7.45)
PH BLDA: 7.55 [PH] (ref 7.35–7.45)
PLATELET # BLD AUTO: 364 10E3/UL (ref 150–450)
PO2 BLDA: 168 MM HG (ref 80–105)
PO2 BLDA: 215 MM HG (ref 80–105)
POTASSIUM BLD-SCNC: 2.7 MMOL/L (ref 3.4–5.3)
POTASSIUM BLD-SCNC: 3.2 MMOL/L (ref 3.4–5.3)
POTASSIUM SERPL-SCNC: 3.1 MMOL/L (ref 3.4–5.3)
RATE PRESSURE PRODUCT: NORMAL
RBC # BLD AUTO: 3.59 10E6/UL (ref 3.8–5.2)
SAO2 % BLDA: 99 % (ref 95–96)
SAO2 % BLDA: 99 % (ref 95–96)
SODIUM BLD-SCNC: 136 MMOL/L (ref 135–145)
SODIUM BLD-SCNC: 140 MMOL/L (ref 135–145)
SODIUM SERPL-SCNC: 134 MMOL/L (ref 135–145)
SPECIMEN EXPIRATION DATE: NORMAL
STRESS ECHO BASELINE DIASTOLIC HE: 89
STRESS ECHO BASELINE HR: 99 BPM
STRESS ECHO BASELINE SYSTOLIC BP: 155
STRESS ECHO CALCULATED PERCENT HR: 81 %
STRESS ECHO LAST STRESS DIASTOLIC BP: 86
STRESS ECHO LAST STRESS SYSTOLIC BP: 162
STRESS ECHO TARGET HR: 149
UNIT ABO/RH: NORMAL
UNIT ABO/RH: NORMAL
UNIT NUMBER: NORMAL
UNIT NUMBER: NORMAL
UNIT STATUS: NORMAL
UNIT STATUS: NORMAL
UNIT TYPE ISBT: 5100
UNIT TYPE ISBT: 5100
WBC # BLD AUTO: 11.6 10E3/UL (ref 4–11)

## 2024-07-12 PROCEDURE — 36415 COLL VENOUS BLD VENIPUNCTURE: CPT | Performed by: OBSTETRICS & GYNECOLOGY

## 2024-07-12 PROCEDURE — 86923 COMPATIBILITY TEST ELECTRIC: CPT | Performed by: STUDENT IN AN ORGANIZED HEALTH CARE EDUCATION/TRAINING PROGRAM

## 2024-07-12 PROCEDURE — 86923 COMPATIBILITY TEST ELECTRIC: CPT

## 2024-07-12 PROCEDURE — 250N000025 HC SEVOFLURANE, PER MIN: Performed by: OBSTETRICS & GYNECOLOGY

## 2024-07-12 PROCEDURE — 250N000011 HC RX IP 250 OP 636: Performed by: OBSTETRICS & GYNECOLOGY

## 2024-07-12 PROCEDURE — 86900 BLOOD TYPING SEROLOGIC ABO: CPT | Performed by: OBSTETRICS & GYNECOLOGY

## 2024-07-12 PROCEDURE — 84295 ASSAY OF SERUM SODIUM: CPT

## 2024-07-12 PROCEDURE — 258N000003 HC RX IP 258 OP 636: Performed by: NURSE ANESTHETIST, CERTIFIED REGISTERED

## 2024-07-12 PROCEDURE — 0UT90ZZ RESECTION OF UTERUS, OPEN APPROACH: ICD-10-PCS | Performed by: OBSTETRICS & GYNECOLOGY

## 2024-07-12 PROCEDURE — 88305 TISSUE EXAM BY PATHOLOGIST: CPT | Mod: 26 | Performed by: PATHOLOGY

## 2024-07-12 PROCEDURE — 58150 TOTAL HYSTERECTOMY: CPT | Performed by: NURSE ANESTHETIST, CERTIFIED REGISTERED

## 2024-07-12 PROCEDURE — 0T1B0ZD BYPASS BLADDER TO CUTANEOUS, OPEN APPROACH: ICD-10-PCS | Performed by: UROLOGY

## 2024-07-12 PROCEDURE — 82374 ASSAY BLOOD CARBON DIOXIDE: CPT | Performed by: NURSE ANESTHETIST, CERTIFIED REGISTERED

## 2024-07-12 PROCEDURE — 250N000009 HC RX 250: Performed by: ANESTHESIOLOGY

## 2024-07-12 PROCEDURE — 85027 COMPLETE CBC AUTOMATED: CPT

## 2024-07-12 PROCEDURE — 250N000013 HC RX MED GY IP 250 OP 250 PS 637: Performed by: OBSTETRICS & GYNECOLOGY

## 2024-07-12 PROCEDURE — 258N000003 HC RX IP 258 OP 636: Performed by: OBSTETRICS & GYNECOLOGY

## 2024-07-12 PROCEDURE — 250N000011 HC RX IP 250 OP 636: Performed by: NURSE ANESTHETIST, CERTIFIED REGISTERED

## 2024-07-12 PROCEDURE — 0TQB0ZZ REPAIR BLADDER, OPEN APPROACH: ICD-10-PCS | Performed by: UROLOGY

## 2024-07-12 PROCEDURE — 58150 TOTAL HYSTERECTOMY: CPT | Performed by: ANESTHESIOLOGY

## 2024-07-12 PROCEDURE — 82810 BLOOD GASES O2 SAT ONLY: CPT

## 2024-07-12 PROCEDURE — 88309 TISSUE EXAM BY PATHOLOGIST: CPT | Mod: TC | Performed by: OBSTETRICS & GYNECOLOGY

## 2024-07-12 PROCEDURE — 0UT10ZZ RESECTION OF LEFT OVARY, OPEN APPROACH: ICD-10-PCS | Performed by: OBSTETRICS & GYNECOLOGY

## 2024-07-12 PROCEDURE — 99100 ANES PT EXTEME AGE<1 YR&>70: CPT | Performed by: NURSE ANESTHETIST, CERTIFIED REGISTERED

## 2024-07-12 PROCEDURE — 0DXU0ZW TRANSFER OMENTUM TO ABDOMINAL REGION, OPEN APPROACH: ICD-10-PCS | Performed by: UROLOGY

## 2024-07-12 PROCEDURE — 250N000011 HC RX IP 250 OP 636

## 2024-07-12 PROCEDURE — 0DBU0ZX EXCISION OF OMENTUM, OPEN APPROACH, DIAGNOSTIC: ICD-10-PCS | Performed by: OBSTETRICS & GYNECOLOGY

## 2024-07-12 PROCEDURE — 250N000011 HC RX IP 250 OP 636: Performed by: ANESTHESIOLOGY

## 2024-07-12 PROCEDURE — 99100 ANES PT EXTEME AGE<1 YR&>70: CPT | Performed by: ANESTHESIOLOGY

## 2024-07-12 PROCEDURE — 88112 CYTOPATH CELL ENHANCE TECH: CPT | Mod: 26 | Performed by: PATHOLOGY

## 2024-07-12 PROCEDURE — 88309 TISSUE EXAM BY PATHOLOGIST: CPT | Mod: 26 | Performed by: PATHOLOGY

## 2024-07-12 PROCEDURE — 0DJW4ZZ INSPECTION OF PERITONEUM, PERCUTANEOUS ENDOSCOPIC APPROACH: ICD-10-PCS | Performed by: OBSTETRICS & GYNECOLOGY

## 2024-07-12 PROCEDURE — 258N000003 HC RX IP 258 OP 636: Performed by: ANESTHESIOLOGY

## 2024-07-12 PROCEDURE — 0UT60ZZ RESECTION OF LEFT FALLOPIAN TUBE, OPEN APPROACH: ICD-10-PCS | Performed by: OBSTETRICS & GYNECOLOGY

## 2024-07-12 PROCEDURE — 710N000010 HC RECOVERY PHASE 1, LEVEL 2, PER MIN: Performed by: OBSTETRICS & GYNECOLOGY

## 2024-07-12 PROCEDURE — 272N000001 HC OR GENERAL SUPPLY STERILE: Performed by: OBSTETRICS & GYNECOLOGY

## 2024-07-12 PROCEDURE — 88305 TISSUE EXAM BY PATHOLOGIST: CPT | Mod: TC | Performed by: OBSTETRICS & GYNECOLOGY

## 2024-07-12 PROCEDURE — 250N000009 HC RX 250: Performed by: NURSE ANESTHETIST, CERTIFIED REGISTERED

## 2024-07-12 PROCEDURE — 360N000076 HC SURGERY LEVEL 3, PER MIN: Performed by: OBSTETRICS & GYNECOLOGY

## 2024-07-12 PROCEDURE — 120N000002 HC R&B MED SURG/OB UMMC

## 2024-07-12 PROCEDURE — 370N000017 HC ANESTHESIA TECHNICAL FEE, PER MIN: Performed by: OBSTETRICS & GYNECOLOGY

## 2024-07-12 PROCEDURE — 0DNW0ZZ RELEASE PERITONEUM, OPEN APPROACH: ICD-10-PCS | Performed by: OBSTETRICS & GYNECOLOGY

## 2024-07-12 PROCEDURE — 999N000141 HC STATISTIC PRE-PROCEDURE NURSING ASSESSMENT: Performed by: OBSTETRICS & GYNECOLOGY

## 2024-07-12 RX ORDER — ONDANSETRON 2 MG/ML
INJECTION INTRAMUSCULAR; INTRAVENOUS PRN
Status: DISCONTINUED | OUTPATIENT
Start: 2024-07-12 | End: 2024-07-12

## 2024-07-12 RX ORDER — ZOLPIDEM TARTRATE 5 MG/1
5 TABLET ORAL
Status: DISCONTINUED | OUTPATIENT
Start: 2024-07-12 | End: 2024-07-17 | Stop reason: HOSPADM

## 2024-07-12 RX ORDER — FENTANYL CITRATE 50 UG/ML
25-50 INJECTION, SOLUTION INTRAMUSCULAR; INTRAVENOUS
Status: DISCONTINUED | OUTPATIENT
Start: 2024-07-12 | End: 2024-07-12 | Stop reason: HOSPADM

## 2024-07-12 RX ORDER — ONDANSETRON 4 MG/1
4 TABLET, ORALLY DISINTEGRATING ORAL EVERY 30 MIN PRN
Status: DISCONTINUED | OUTPATIENT
Start: 2024-07-12 | End: 2024-07-12 | Stop reason: HOSPADM

## 2024-07-12 RX ORDER — KETAMINE HYDROCHLORIDE 10 MG/ML
INJECTION INTRAMUSCULAR; INTRAVENOUS PRN
Status: DISCONTINUED | OUTPATIENT
Start: 2024-07-12 | End: 2024-07-12

## 2024-07-12 RX ORDER — SIMETHICONE 80 MG
80 TABLET,CHEWABLE ORAL 4 TIMES DAILY PRN
Status: DISCONTINUED | OUTPATIENT
Start: 2024-07-12 | End: 2024-07-17 | Stop reason: HOSPADM

## 2024-07-12 RX ORDER — AMOXICILLIN 250 MG
2 CAPSULE ORAL 2 TIMES DAILY
Status: DISCONTINUED | OUTPATIENT
Start: 2024-07-12 | End: 2024-07-17 | Stop reason: HOSPADM

## 2024-07-12 RX ORDER — NALOXONE HYDROCHLORIDE 0.4 MG/ML
0.2 INJECTION, SOLUTION INTRAMUSCULAR; INTRAVENOUS; SUBCUTANEOUS
Status: DISCONTINUED | OUTPATIENT
Start: 2024-07-12 | End: 2024-07-17 | Stop reason: HOSPADM

## 2024-07-12 RX ORDER — FLUMAZENIL 0.1 MG/ML
0.2 INJECTION, SOLUTION INTRAVENOUS
Status: DISCONTINUED | OUTPATIENT
Start: 2024-07-12 | End: 2024-07-12 | Stop reason: HOSPADM

## 2024-07-12 RX ORDER — DEXMEDETOMIDINE HYDROCHLORIDE 4 UG/ML
INJECTION, SOLUTION INTRAVENOUS
Status: COMPLETED | OUTPATIENT
Start: 2024-07-12 | End: 2024-07-12

## 2024-07-12 RX ORDER — SODIUM CHLORIDE, SODIUM LACTATE, POTASSIUM CHLORIDE, CALCIUM CHLORIDE 600; 310; 30; 20 MG/100ML; MG/100ML; MG/100ML; MG/100ML
INJECTION, SOLUTION INTRAVENOUS CONTINUOUS
Status: DISCONTINUED | OUTPATIENT
Start: 2024-07-12 | End: 2024-07-12 | Stop reason: HOSPADM

## 2024-07-12 RX ORDER — BENZTROPINE MESYLATE 1 MG/1
1 TABLET ORAL DAILY
Status: DISCONTINUED | OUTPATIENT
Start: 2024-07-13 | End: 2024-07-17 | Stop reason: HOSPADM

## 2024-07-12 RX ORDER — SODIUM CHLORIDE, SODIUM LACTATE, POTASSIUM CHLORIDE, CALCIUM CHLORIDE 600; 310; 30; 20 MG/100ML; MG/100ML; MG/100ML; MG/100ML
INJECTION, SOLUTION INTRAVENOUS CONTINUOUS PRN
Status: DISCONTINUED | OUTPATIENT
Start: 2024-07-12 | End: 2024-07-12

## 2024-07-12 RX ORDER — AMOXICILLIN 250 MG
1 CAPSULE ORAL 2 TIMES DAILY
Status: DISCONTINUED | OUTPATIENT
Start: 2024-07-12 | End: 2024-07-17 | Stop reason: HOSPADM

## 2024-07-12 RX ORDER — NALOXONE HYDROCHLORIDE 0.4 MG/ML
0.1 INJECTION, SOLUTION INTRAMUSCULAR; INTRAVENOUS; SUBCUTANEOUS
Status: DISCONTINUED | OUTPATIENT
Start: 2024-07-12 | End: 2024-07-12 | Stop reason: HOSPADM

## 2024-07-12 RX ORDER — HYDROMORPHONE HCL IN WATER/PF 6 MG/30 ML
0.2 PATIENT CONTROLLED ANALGESIA SYRINGE INTRAVENOUS EVERY 5 MIN PRN
Status: DISCONTINUED | OUTPATIENT
Start: 2024-07-12 | End: 2024-07-12 | Stop reason: HOSPADM

## 2024-07-12 RX ORDER — DEXAMETHASONE SODIUM PHOSPHATE 4 MG/ML
INJECTION, SOLUTION INTRA-ARTICULAR; INTRALESIONAL; INTRAMUSCULAR; INTRAVENOUS; SOFT TISSUE PRN
Status: DISCONTINUED | OUTPATIENT
Start: 2024-07-12 | End: 2024-07-12

## 2024-07-12 RX ORDER — ACETAMINOPHEN 325 MG/1
650 TABLET ORAL EVERY 6 HOURS
Status: DISCONTINUED | OUTPATIENT
Start: 2024-07-12 | End: 2024-07-17 | Stop reason: HOSPADM

## 2024-07-12 RX ORDER — SODIUM CHLORIDE, SODIUM LACTATE, POTASSIUM CHLORIDE, CALCIUM CHLORIDE 600; 310; 30; 20 MG/100ML; MG/100ML; MG/100ML; MG/100ML
INJECTION, SOLUTION INTRAVENOUS CONTINUOUS
Status: DISCONTINUED | OUTPATIENT
Start: 2024-07-12 | End: 2024-07-15

## 2024-07-12 RX ORDER — LABETALOL HYDROCHLORIDE 5 MG/ML
10 INJECTION, SOLUTION INTRAVENOUS
Status: DISCONTINUED | OUTPATIENT
Start: 2024-07-12 | End: 2024-07-12 | Stop reason: HOSPADM

## 2024-07-12 RX ORDER — NALOXONE HYDROCHLORIDE 0.4 MG/ML
0.4 INJECTION, SOLUTION INTRAMUSCULAR; INTRAVENOUS; SUBCUTANEOUS
Status: DISCONTINUED | OUTPATIENT
Start: 2024-07-12 | End: 2024-07-17 | Stop reason: HOSPADM

## 2024-07-12 RX ORDER — NALOXONE HYDROCHLORIDE 0.4 MG/ML
0.2 INJECTION, SOLUTION INTRAMUSCULAR; INTRAVENOUS; SUBCUTANEOUS
Status: DISCONTINUED | OUTPATIENT
Start: 2024-07-12 | End: 2024-07-12 | Stop reason: HOSPADM

## 2024-07-12 RX ORDER — OXYCODONE HYDROCHLORIDE 5 MG/1
5-10 TABLET ORAL EVERY 4 HOURS PRN
Status: DISCONTINUED | OUTPATIENT
Start: 2024-07-12 | End: 2024-07-17 | Stop reason: HOSPADM

## 2024-07-12 RX ORDER — FAMOTIDINE 20 MG/1
20 TABLET, FILM COATED ORAL
Status: DISCONTINUED | OUTPATIENT
Start: 2024-07-13 | End: 2024-07-17 | Stop reason: HOSPADM

## 2024-07-12 RX ORDER — ONDANSETRON 2 MG/ML
4 INJECTION INTRAMUSCULAR; INTRAVENOUS EVERY 30 MIN PRN
Status: DISCONTINUED | OUTPATIENT
Start: 2024-07-12 | End: 2024-07-12 | Stop reason: HOSPADM

## 2024-07-12 RX ORDER — SODIUM CHLORIDE, SODIUM GLUCONATE, SODIUM ACETATE, POTASSIUM CHLORIDE AND MAGNESIUM CHLORIDE 526; 502; 368; 37; 30 MG/100ML; MG/100ML; MG/100ML; MG/100ML; MG/100ML
INJECTION, SOLUTION INTRAVENOUS CONTINUOUS PRN
Status: DISCONTINUED | OUTPATIENT
Start: 2024-07-12 | End: 2024-07-12

## 2024-07-12 RX ORDER — FENTANYL CITRATE 50 UG/ML
25 INJECTION, SOLUTION INTRAMUSCULAR; INTRAVENOUS EVERY 5 MIN PRN
Status: DISCONTINUED | OUTPATIENT
Start: 2024-07-12 | End: 2024-07-12 | Stop reason: HOSPADM

## 2024-07-12 RX ORDER — BISACODYL 10 MG
10 SUPPOSITORY, RECTAL RECTAL DAILY
Status: DISCONTINUED | OUTPATIENT
Start: 2024-07-13 | End: 2024-07-17 | Stop reason: HOSPADM

## 2024-07-12 RX ORDER — SERTRALINE HYDROCHLORIDE 25 MG/1
25 TABLET, FILM COATED ORAL DAILY
Status: DISCONTINUED | OUTPATIENT
Start: 2024-07-13 | End: 2024-07-17 | Stop reason: HOSPADM

## 2024-07-12 RX ORDER — QUETIAPINE FUMARATE 400 MG/1
400 TABLET, FILM COATED ORAL AT BEDTIME
Status: DISCONTINUED | OUTPATIENT
Start: 2024-07-12 | End: 2024-07-17 | Stop reason: HOSPADM

## 2024-07-12 RX ORDER — POTASSIUM CHLORIDE 7.45 MG/ML
INJECTION INTRAVENOUS PRN
Status: DISCONTINUED | OUTPATIENT
Start: 2024-07-12 | End: 2024-07-12

## 2024-07-12 RX ORDER — CEFAZOLIN SODIUM/WATER 2 G/20 ML
2 SYRINGE (ML) INTRAVENOUS
Status: COMPLETED | OUTPATIENT
Start: 2024-07-12 | End: 2024-07-12

## 2024-07-12 RX ORDER — LIDOCAINE HYDROCHLORIDE 20 MG/ML
INJECTION, SOLUTION INFILTRATION; PERINEURAL PRN
Status: DISCONTINUED | OUTPATIENT
Start: 2024-07-12 | End: 2024-07-12

## 2024-07-12 RX ORDER — LIDOCAINE 40 MG/G
CREAM TOPICAL
Status: DISCONTINUED | OUTPATIENT
Start: 2024-07-12 | End: 2024-07-17 | Stop reason: HOSPADM

## 2024-07-12 RX ORDER — PROPOFOL 10 MG/ML
INJECTION, EMULSION INTRAVENOUS PRN
Status: DISCONTINUED | OUTPATIENT
Start: 2024-07-12 | End: 2024-07-12

## 2024-07-12 RX ORDER — NALOXONE HYDROCHLORIDE 0.4 MG/ML
0.4 INJECTION, SOLUTION INTRAMUSCULAR; INTRAVENOUS; SUBCUTANEOUS
Status: DISCONTINUED | OUTPATIENT
Start: 2024-07-12 | End: 2024-07-12 | Stop reason: HOSPADM

## 2024-07-12 RX ORDER — ATENOLOL 25 MG/1
100 TABLET ORAL DAILY
Status: DISCONTINUED | OUTPATIENT
Start: 2024-07-13 | End: 2024-07-17 | Stop reason: HOSPADM

## 2024-07-12 RX ORDER — GLYCOPYRROLATE 0.2 MG/ML
INJECTION, SOLUTION INTRAMUSCULAR; INTRAVENOUS PRN
Status: DISCONTINUED | OUTPATIENT
Start: 2024-07-12 | End: 2024-07-12

## 2024-07-12 RX ORDER — CEFAZOLIN SODIUM/WATER 2 G/20 ML
2 SYRINGE (ML) INTRAVENOUS SEE ADMIN INSTRUCTIONS
Status: DISCONTINUED | OUTPATIENT
Start: 2024-07-12 | End: 2024-07-12 | Stop reason: HOSPADM

## 2024-07-12 RX ORDER — BUPIVACAINE HYDROCHLORIDE 2.5 MG/ML
INJECTION, SOLUTION EPIDURAL; INFILTRATION; INTRACAUDAL
Status: COMPLETED | OUTPATIENT
Start: 2024-07-12 | End: 2024-07-12

## 2024-07-12 RX ORDER — HEPARIN SODIUM 5000 [USP'U]/.5ML
5000 INJECTION, SOLUTION INTRAVENOUS; SUBCUTANEOUS
Status: COMPLETED | OUTPATIENT
Start: 2024-07-12 | End: 2024-07-12

## 2024-07-12 RX ORDER — EPHEDRINE SULFATE 50 MG/ML
INJECTION, SOLUTION INTRAMUSCULAR; INTRAVENOUS; SUBCUTANEOUS PRN
Status: DISCONTINUED | OUTPATIENT
Start: 2024-07-12 | End: 2024-07-12

## 2024-07-12 RX ORDER — HYDRALAZINE HYDROCHLORIDE 20 MG/ML
2.5-5 INJECTION INTRAMUSCULAR; INTRAVENOUS EVERY 10 MIN PRN
Status: DISCONTINUED | OUTPATIENT
Start: 2024-07-12 | End: 2024-07-12 | Stop reason: HOSPADM

## 2024-07-12 RX ORDER — BUPIVACAINE HYDROCHLORIDE AND EPINEPHRINE 2.5; 5 MG/ML; UG/ML
INJECTION, SOLUTION INFILTRATION; PERINEURAL
Status: COMPLETED | OUTPATIENT
Start: 2024-07-12 | End: 2024-07-12

## 2024-07-12 RX ORDER — DEXAMETHASONE SODIUM PHOSPHATE 10 MG/ML
INJECTION, SOLUTION INTRAMUSCULAR; INTRAVENOUS
Status: COMPLETED | OUTPATIENT
Start: 2024-07-12 | End: 2024-07-12

## 2024-07-12 RX ORDER — ALBUTEROL SULFATE 90 UG/1
1-2 AEROSOL, METERED RESPIRATORY (INHALATION) EVERY 4 HOURS PRN
Status: DISCONTINUED | OUTPATIENT
Start: 2024-07-12 | End: 2024-07-17 | Stop reason: HOSPADM

## 2024-07-12 RX ORDER — METRONIDAZOLE 500 MG/100ML
500 INJECTION, SOLUTION INTRAVENOUS
Status: COMPLETED | OUTPATIENT
Start: 2024-07-12 | End: 2024-07-12

## 2024-07-12 RX ADMIN — PROPOFOL 60 MG: 10 INJECTION, EMULSION INTRAVENOUS at 13:39

## 2024-07-12 RX ADMIN — EPHEDRINE SULFATE 5 MG: 5 INJECTION INTRAVENOUS at 15:03

## 2024-07-12 RX ADMIN — PHENYLEPHRINE HYDROCHLORIDE 0.4 MCG/KG/MIN: 10 INJECTION INTRAVENOUS at 15:25

## 2024-07-12 RX ADMIN — LIDOCAINE HYDROCHLORIDE 100 MG: 20 INJECTION, SOLUTION INFILTRATION; PERINEURAL at 13:39

## 2024-07-12 RX ADMIN — FENTANYL CITRATE 25 MCG: 50 INJECTION INTRAMUSCULAR; INTRAVENOUS at 18:00

## 2024-07-12 RX ADMIN — HYDROMORPHONE HYDROCHLORIDE 0.2 MG: 0.2 INJECTION, SOLUTION INTRAMUSCULAR; INTRAVENOUS; SUBCUTANEOUS at 19:29

## 2024-07-12 RX ADMIN — HYDROMORPHONE HYDROCHLORIDE 0.5 MG: 1 INJECTION, SOLUTION INTRAMUSCULAR; INTRAVENOUS; SUBCUTANEOUS at 17:48

## 2024-07-12 RX ADMIN — DEXAMETHASONE SODIUM PHOSPHATE 2 MG: 10 INJECTION, SOLUTION INTRAMUSCULAR; INTRAVENOUS at 13:45

## 2024-07-12 RX ADMIN — HYDROMORPHONE HYDROCHLORIDE 0.5 MG: 1 INJECTION, SOLUTION INTRAMUSCULAR; INTRAVENOUS; SUBCUTANEOUS at 16:54

## 2024-07-12 RX ADMIN — SODIUM CHLORIDE, POTASSIUM CHLORIDE, SODIUM LACTATE AND CALCIUM CHLORIDE: 600; 310; 30; 20 INJECTION, SOLUTION INTRAVENOUS at 13:28

## 2024-07-12 RX ADMIN — MIDAZOLAM 1 MG: 1 INJECTION INTRAMUSCULAR; INTRAVENOUS at 13:28

## 2024-07-12 RX ADMIN — HYDROMORPHONE HYDROCHLORIDE 0.5 MG: 1 INJECTION, SOLUTION INTRAMUSCULAR; INTRAVENOUS; SUBCUTANEOUS at 15:09

## 2024-07-12 RX ADMIN — HYDROMORPHONE HYDROCHLORIDE 0.2 MG: 0.2 INJECTION, SOLUTION INTRAMUSCULAR; INTRAVENOUS; SUBCUTANEOUS at 19:41

## 2024-07-12 RX ADMIN — FENTANYL CITRATE 50 MCG: 50 INJECTION INTRAMUSCULAR; INTRAVENOUS at 13:54

## 2024-07-12 RX ADMIN — Medication 30 MG: at 13:39

## 2024-07-12 RX ADMIN — EPHEDRINE SULFATE 5 MG: 5 INJECTION INTRAVENOUS at 14:56

## 2024-07-12 RX ADMIN — GLYCOPYRROLATE 0.1 MG: 0.2 INJECTION, SOLUTION INTRAMUSCULAR; INTRAVENOUS at 14:14

## 2024-07-12 RX ADMIN — EPHEDRINE SULFATE 5 MG: 5 INJECTION INTRAVENOUS at 15:17

## 2024-07-12 RX ADMIN — SODIUM CHLORIDE, POTASSIUM CHLORIDE, SODIUM LACTATE AND CALCIUM CHLORIDE: 600; 310; 30; 20 INJECTION, SOLUTION INTRAVENOUS at 19:28

## 2024-07-12 RX ADMIN — METRONIDAZOLE 500 MG: 500 INJECTION, SOLUTION INTRAVENOUS at 12:06

## 2024-07-12 RX ADMIN — SENNOSIDES AND DOCUSATE SODIUM 1 TABLET: 50; 8.6 TABLET ORAL at 23:03

## 2024-07-12 RX ADMIN — FENTANYL CITRATE 50 MCG: 50 INJECTION INTRAMUSCULAR; INTRAVENOUS at 18:13

## 2024-07-12 RX ADMIN — FENTANYL CITRATE 25 MCG: 50 INJECTION, SOLUTION INTRAMUSCULAR; INTRAVENOUS at 18:17

## 2024-07-12 RX ADMIN — PHENYLEPHRINE HYDROCHLORIDE 100 MCG: 10 INJECTION INTRAVENOUS at 15:04

## 2024-07-12 RX ADMIN — MIDAZOLAM 1 MG: 1 INJECTION INTRAMUSCULAR; INTRAVENOUS at 15:14

## 2024-07-12 RX ADMIN — Medication 50 MG: at 13:39

## 2024-07-12 RX ADMIN — SODIUM CHLORIDE, POTASSIUM CHLORIDE, SODIUM LACTATE AND CALCIUM CHLORIDE: 600; 310; 30; 20 INJECTION, SOLUTION INTRAVENOUS at 13:39

## 2024-07-12 RX ADMIN — FENTANYL CITRATE 25 MCG: 50 INJECTION, SOLUTION INTRAMUSCULAR; INTRAVENOUS at 18:28

## 2024-07-12 RX ADMIN — POTASSIUM CHLORIDE 10 MEQ: 7.46 INJECTION, SOLUTION INTRAVENOUS at 15:51

## 2024-07-12 RX ADMIN — Medication 20 MG: at 15:14

## 2024-07-12 RX ADMIN — FENTANYL CITRATE 25 MCG: 50 INJECTION, SOLUTION INTRAMUSCULAR; INTRAVENOUS at 18:59

## 2024-07-12 RX ADMIN — POTASSIUM CHLORIDE 10 MEQ: 7.46 INJECTION, SOLUTION INTRAVENOUS at 14:52

## 2024-07-12 RX ADMIN — PHENYLEPHRINE HYDROCHLORIDE 100 MCG: 10 INJECTION INTRAVENOUS at 15:20

## 2024-07-12 RX ADMIN — Medication 10 MG: at 15:54

## 2024-07-12 RX ADMIN — Medication 2 G: at 13:36

## 2024-07-12 RX ADMIN — EPHEDRINE SULFATE 10 MG: 5 INJECTION INTRAVENOUS at 13:54

## 2024-07-12 RX ADMIN — FENTANYL CITRATE 25 MCG: 50 INJECTION, SOLUTION INTRAMUSCULAR; INTRAVENOUS at 18:33

## 2024-07-12 RX ADMIN — Medication 100 MG: at 17:41

## 2024-07-12 RX ADMIN — DEXAMETHASONE SODIUM PHOSPHATE 10 MG: 4 INJECTION, SOLUTION INTRA-ARTICULAR; INTRALESIONAL; INTRAMUSCULAR; INTRAVENOUS; SOFT TISSUE at 13:39

## 2024-07-12 RX ADMIN — Medication 20 MG: at 14:14

## 2024-07-12 RX ADMIN — PHENYLEPHRINE HYDROCHLORIDE 100 MCG: 10 INJECTION INTRAVENOUS at 16:59

## 2024-07-12 RX ADMIN — SODIUM CHLORIDE, SODIUM GLUCONATE, SODIUM ACETATE, POTASSIUM CHLORIDE AND MAGNESIUM CHLORIDE: 526; 502; 368; 37; 30 INJECTION, SOLUTION INTRAVENOUS at 16:19

## 2024-07-12 RX ADMIN — FENTANYL CITRATE 25 MCG: 50 INJECTION, SOLUTION INTRAMUSCULAR; INTRAVENOUS at 18:23

## 2024-07-12 RX ADMIN — FENTANYL CITRATE 25 MCG: 50 INJECTION, SOLUTION INTRAMUSCULAR; INTRAVENOUS at 18:42

## 2024-07-12 RX ADMIN — ACETAMINOPHEN 650 MG: 325 TABLET, FILM COATED ORAL at 23:03

## 2024-07-12 RX ADMIN — Medication 100 MG: at 17:44

## 2024-07-12 RX ADMIN — Medication 40 MCG: at 13:45

## 2024-07-12 RX ADMIN — HYDROMORPHONE HYDROCHLORIDE 0.2 MG: 0.2 INJECTION, SOLUTION INTRAMUSCULAR; INTRAVENOUS; SUBCUTANEOUS at 20:23

## 2024-07-12 RX ADMIN — Medication 2 G: at 17:35

## 2024-07-12 RX ADMIN — ONDANSETRON 4 MG: 2 INJECTION INTRAMUSCULAR; INTRAVENOUS at 17:35

## 2024-07-12 RX ADMIN — BUPIVACAINE HYDROCHLORIDE AND EPINEPHRINE BITARTRATE 40 ML: 2.5; .005 INJECTION, SOLUTION INFILTRATION; PERINEURAL at 13:45

## 2024-07-12 RX ADMIN — HEPARIN SODIUM 5000 UNITS: 5000 INJECTION, SOLUTION INTRAVENOUS; SUBCUTANEOUS at 12:55

## 2024-07-12 RX ADMIN — BUPIVACAINE HYDROCHLORIDE 10 ML: 2.5 INJECTION, SOLUTION EPIDURAL; INFILTRATION; INTRACAUDAL at 13:45

## 2024-07-12 RX ADMIN — Medication 10 MG: at 16:36

## 2024-07-12 RX ADMIN — Medication 10 MG: at 14:48

## 2024-07-12 RX ADMIN — OXYCODONE HYDROCHLORIDE 5 MG: 5 TABLET ORAL at 23:03

## 2024-07-12 RX ADMIN — PHENYLEPHRINE HYDROCHLORIDE 50 MCG: 10 INJECTION INTRAVENOUS at 15:19

## 2024-07-12 RX ADMIN — FENTANYL CITRATE 25 MCG: 50 INJECTION INTRAMUSCULAR; INTRAVENOUS at 18:04

## 2024-07-12 RX ADMIN — FENTANYL CITRATE 50 MCG: 50 INJECTION INTRAMUSCULAR; INTRAVENOUS at 13:39

## 2024-07-12 ASSESSMENT — ACTIVITIES OF DAILY LIVING (ADL)
ADLS_ACUITY_SCORE: 37
ADLS_ACUITY_SCORE: 35
ADLS_ACUITY_SCORE: 37
ADLS_ACUITY_SCORE: 37
ADLS_ACUITY_SCORE: 35
FALL_HISTORY_WITHIN_LAST_SIX_MONTHS: NO
ADLS_ACUITY_SCORE: 33
ADLS_ACUITY_SCORE: 37
ADLS_ACUITY_SCORE: 37

## 2024-07-12 ASSESSMENT — ENCOUNTER SYMPTOMS: DYSRHYTHMIAS: 1

## 2024-07-12 NOTE — ANESTHESIA PROCEDURE NOTES
Airway       Patient location during procedure: OR       Procedure Start/Stop Times: 7/12/2024 1:43 PM  Staff -        CRNA: Chelita Fabian APRN CRNA       Performed By: CRNA  Consent for Airway        Urgency: elective  Indications and Patient Condition       Indications for airway management: quang-procedural       Induction type:intravenous       Mask difficulty assessment: 1 - vent by mask    Final Airway Details       Final airway type: endotracheal airway       Successful airway: ETT - single and Oral  Endotracheal Airway Details        ETT size (mm): 7.0       Cuffed: yes       Successful intubation technique: direct laryngoscopy       DL Blade Type: Chua 2       Grade View of Cords: 1       Adjucts: stylet       Position: Right       Measured from: gums/teeth       Secured at (cm): 22       Bite block used: None    Post intubation assessment        Placement verified by: capnometry, equal breath sounds and chest rise        Number of attempts at approach: 1       Number of other approaches attempted: 0       Secured with: tape       Ease of procedure: easy       Dentition: Unchanged (edentulous)    Medication(s) Administered   Medication Administration Time: 7/12/2024 1:43 PM

## 2024-07-12 NOTE — ANESTHESIA PREPROCEDURE EVALUATION
Anesthesia Pre-Procedure Evaluation    Patient: Alis Hartman   MRN: [id number] : 1955        Procedure : Procedure(s):  Diagnostic laparoscopy, possible total laparoscopic hysterectomy, bilateral salpingo-oophorectomy  possible conversion to open surgery          Past Medical History:   Diagnosis Date    Atrial fibrillation (H)     Depression     Hypertension     Malignant neoplasm of endocervix (H)     Tx with radiation    Other chronic pain     Low back    Schizophrenia (H)     Urinary incontinence       Past Surgical History:   Procedure Laterality Date    ABDOMINOPLASTY      BIOPSY CERVICAL, LOCAL EXCISION, SINGLE/MULTIPLE  10/26/2011    Procedure:BIOPSY CERVICAL, LOCAL EXCISION, SINGLE/MULTIPLE; EUA, cervical biopsies; Surgeon:BETTY TINEO; Location:[facility] OR    BIOPSY VAGINAL N/A 2021    Procedure: BIOPSY, VAGINA;  Surgeon: Dany Perez MD;  Location: [facility] OR    CYSTOSCOPY N/A 2024    Procedure: Cystoscopy;  Surgeon: Dany Perez MD;  Location: [facility] OR    DILATION AND CURETTAGE, WITH ULTRASOUND GUIDANCE N/A 2024    Procedure: Dilation and curettage of the uterus with drainage of uterine fluid under ultrasound guidance, lysis of vaginal adhesions;  Surgeon: Dany Perez MD;  Location: [facility] OR    EXAM UNDER ANESTHESIA PELVIC N/A 3/12/2020    Procedure: Exam under anesthsia, vaginal biopsies, possible CO2 laser of the vagina;  Surgeon: Lilliam Roy MD;  Location: [facility] OR    GI SURGERY      gastric bypass    LASER CO2 VAGINA N/A 3/2/2015    Procedure: LASER CO2 VAGINA;  Surgeon: Mariela Abdalla MD;  Location: [facility] OR    LASER CO2 VAGINA N/A 2019    Procedure: Exam under anesthesia, vaginal biopsies, CO2 laser of the vagina;  Surgeon: Lilliam Roy MD;  Location: [facility] OR    LASER CO2 VAGINA N/A 2021    Procedure: Exam under anesthesia, laser ablation of the upper vagina;  Surgeon: Dany Perez MD;  Location: [facility] OR      Allergies   Allergen  Reactions    Ibuprofen Nausea and Vomiting    Shrimp     Sulfa Antibiotics Rash      Social History     Tobacco Use    Smoking status: Never    Smokeless tobacco: Never   Substance Use Topics    Alcohol use: Not Currently      Wt Readings from Last 1 Encounters:   05/28/24 73 kg (161 lb)        Anesthesia Evaluation   Pt has had prior anesthetic. Type: General and MAC.        ROS/MED HX  ENT/Pulmonary:  - neg pulmonary ROS     Neurologic:  - neg neurologic ROS     Cardiovascular: Comment: LE edema - neg cardiovascular ROS   (+)  hypertension- -   -  - -                        dysrhythmias, a-fib,             METS/Exercise Tolerance:     Hematologic:  - neg hematologic  ROS   (+)      anemia,          Musculoskeletal:  - neg musculoskeletal ROS (+)  arthritis,             GI/Hepatic:  - neg GI/hepatic ROS     Renal/Genitourinary:  - neg Renal ROS   (+) renal disease, type: CRI,            Endo:  - neg endo ROS   (+)               Obesity,       Psychiatric/Substance Use:  - neg psychiatric ROS   (+) psychiatric history depression and schizophrenia       Infectious Disease:  - neg infectious disease ROS     Malignancy:  - neg malignancy ROS (+) Malignancy, History of Other.Other CA Malignant neoplasm of status post.    Other:  - neg other ROS    (+)  , H/O Chronic Pain,         Physical Exam    Airway  airway exam normal      Mallampati: II   TM distance: > 3 FB   Neck ROM: full   Mouth opening: > 3 cm    Respiratory Devices and Support         Dental       (+) Edentulous      Cardiovascular          Rhythm and rate: regular and normal     Pulmonary   pulmonary exam normal        breath sounds clear to auscultation           OUTSIDE LABS:  CBC:   Lab Results   Component Value Date    WBC 10.2 05/15/2024    WBC 3.9 (L) 01/26/2015    HGB 7.7 (L) 05/17/2024    HGB 7.9 (LL) 05/15/2024    HCT 27.7 (L) 05/15/2024    HCT 37.5 01/26/2015     (H) 05/15/2024     01/26/2015     BMP:   Lab Results   Component Value  "Date     05/15/2024     01/26/2015    POTASSIUM 3.8 05/15/2024    POTASSIUM 4.3 05/16/2019    CHLORIDE 105 05/15/2024    CHLORIDE 106 01/26/2015    CO2 25 05/15/2024    CO2 31 01/26/2015    BUN 16.6 05/15/2024    BUN 8 01/26/2015    CR 1.24 (H) 05/15/2024    CR 1.4 (H) 05/03/2024    GLC 79 05/17/2024     (H) 05/15/2024     COAGS: No results found for: \"PTT\", \"INR\", \"FIBR\"  POC: No results found for: \"BGM\", \"HCG\", \"HCGS\"  HEPATIC: No results found for: \"ALBUMIN\", \"PROTTOTAL\", \"ALT\", \"AST\", \"GGT\", \"ALKPHOS\", \"BILITOTAL\", \"BILIDIRECT\", \"STEWART\"  OTHER:   Lab Results   Component Value Date    SAMUEL 9.0 05/15/2024    MAG 2.0 05/15/2024       Anesthesia Plan    ASA Status:  3    NPO Status:  NPO Appropriate    Anesthesia Type: General.     - Airway: ETT   Induction: Intravenous, Propofol.   Maintenance: Balanced.   Techniques and Equipment:     - Airway: Video-Laryngoscope     - Lines/Monitors: 2nd IV, BIS     - Blood: T&C     - Drips/Meds: Phenylephrine     Consents    Anesthesia Plan(s) and associated risks, benefits, and realistic alternatives discussed. Questions answered and patient/representative(s) expressed understanding.     - Discussed: Risks, Benefits and Alternatives for the PROCEDURE were discussed     - Discussed with:  Patient      - Extended Intubation/Ventilatory Support Discussed: No.      - Patient is DNR/DNI Status: No     Use of blood products discussed: No .     Postoperative Care    Pain management: Oral pain medications, IV analgesics, Multi-modal analgesia.   PONV prophylaxis: Ondansetron (or other 5HT-3), Background Propofol Infusion     Comments:               Matt Whaley MD    I have reviewed the pertinent notes and labs in the chart from the past 30 days and (re)examined the patient.  Any updates or changes from those notes are reflected in this note.            # Drug Induced Coagulation Defect: home medication list includes an anticoagulant medication       "

## 2024-07-12 NOTE — OP NOTE
[facility] - Operative Note    Pre-operative diagnosis:   Serous carcinoma of the uterus  History of cervical cancer   History of pelvic radiation therapy  Bilateral hydroureteronephrosis  Urinary incontinence  Elevated creatinine    Post-operative diagnosis:   Same  Radiation fibrosis  Cystotomy    Procedure(s):  Diagnostic laparoscopy converted to exploratory laparotomy, total abdominal hysterectomy, bilateral salpingo-oophorectomy, lysis of adhesions >60 min  Repair of cystotomy  Insertion of supra-pubic catheter     Surgeons and Role:     * Dany Perez MD - Primary     * Black Monroy MD - Assisting     * Odilia Helm MD - Resident - Assisting     * Yeny Wolf MD - Resident - Assisting     * Abigail Melo MD - Resident - Assisting     * Anatoly Avina MD - Fellow - Assisting    Anesthesia:   General     EBL: 50ml from our portion    Drains: None    Specimen(s):  ID Type Source Tests Collected by Time Destination   1 : pelvic washings Washings Pelvis NON-GYNECOLOGIC CYTOLOGY Dany Perez MD 7/12/2024  2:28 PM    2 : Pelvic Calcified mass Tissue Pelvis SURGICAL PATHOLOGY EXAM Dany Perez MD 7/12/2024  3:16 PM    3 : Uterus, cervix, left fallopian tube and ovary Tissue Uterus, Left Fallopian Tube and Ovary SURGICAL PATHOLOGY EXAM Dany Perez MD 7/12/2024  3:35 PM    4 : Cervical Remnant Tissue Cervix SURGICAL PATHOLOGY EXAM Dany Perez MD 7/12/2024  3:43 PM    5 : Omentum biopsy Biopsy Omentum SURGICAL PATHOLOGY EXAM Dany Perez MD 7/12/2024  5:00 PM        Findings: Vaginal agglutination with no overt cervix but 1cm opening into the uterine cavity with drainage of bloody fluid. Constant urinary leakage from the urethra. Upon entry into the abdomen, normal upper abdomen with adhesions of the omentum to the anterior abdominal wall in the left upper quadrant. The bowel was normal as were the peritoneal surfaces. The pelvic tissues are pale c/w  "prior radiation therapy. The right tube and ovary are surgically absent. The left tube and ovary were normal in appearance. Uterus overall normal. In the posterior cul-de-sac was a calcified mass which was not attached to any other structure. Tissue was fibrotic and friable. The bladder was densely adherent to the anterior lower uterus and cervix and extended bilaterally up to nearly the level of the round ligaments. Due to inability to dissect the bladder, the decision was made to convert to open procedure.    Complications: None apparent, there was a cystotomy which was inherent to the risks of the procedure    Implants: None     Indication:  Luwanna \"Aranza\" Washington is a 69 year old with a remote history of cervical cancer treated with radiation therapy who was incidentally noted to have a distended uterus on imaging. Further work-up was recommended but she was lost to follow-up. She re-presented after noting worsening symptoms of urinary leakage and was found to have significant enlargement of the uterus in addition to hydroureteronephrosis. She underwent EUA to open agglutinated vagina and drain the uterus as well as obtain tissue for diagnosis. D&C specimen showed serous carcinoma of the uterus. She was consented for hysterectomy procedure and counseled on the additional risks due to history of radiation therapy. Because of this, plan was made to forego retroperitoneal lymph node assessment due to significant risk.    Description of Procedure:  Patient was brought to the operating room. Informed consent had previously been obtained. Surgical time out was performed. General anesthesia was induced. She was placed in the dorsal lithotomy position with the aid of Aly stirrups. She was prepped and draped in the usual sterile fashion and a Shahid catheter was placed in the bladder. An exam revealed agglutinated vagina which was opened. A long, large Case dilator was placed into the uterus to allow manipulation. " An incision was made in the middle of the abdomen (absent umbilicus from prior abdominoplasty) and the abdomen was insufflated using a Veress. A 5mm port was placed with direct visualization. Three additional 5 mm ports were placed. We attempted to begin the procedure and were able to transect the round ligament and the left ovarian vessels, however, when trying to dissect towards the vesicouterine peritoneum, we were unable to identify the plane without digging into the uterus on the right side. We were able to take the bladder down a small amount, however, due to significant adhesions we were cautious in this dissection. The bladder was backfilled and was noted to extend quite lateral on both sides. There was a pinpoint area where a small amount of methylene blue saline was noted with the backfill concerning for a small bladder defect, although we were not able to reproduce this or isolate a defect. At this point it was apparent that to do any further dissection of the bladder, we would need to convert to open laparotomy.     A transverse incision was made through the site of her prior abdominoplasty and this was carried to fascia which was opened. The rectus muscles were transected to allow adequate visualization. Ye retractor was placed and the bowel was packed. The calcified mass was removed from the pelvis. This was of unclear etiology but was not connected to other tissues aside from some filmy adhesions within the pelvis to the rectum. We turned our attention to the hysterectomy again and we continued the dissection through the broad ligaments reaching dense adhesions on the right side we proceeded to the left. We began to develop the bladder plane and almost immediately entered the bladder between the cervix and the bladder. The bladder was significantly thickened and fibrotic on its anterior and superior aspect but the posterior wall was quite thin. At this point I called the on call Urologist given  the radiated tissue and need for cystotomy repair. While awaiting their arrival, we were able to find a plane on the left and were able to clamp and transect the cardinal and uterosacral ligaments and enter the vagina. We then worked our way retrograde from the vagina to identify the appropriate location on the right to take a similar bite, although we knowingly left a small area at this time on the bladdder. After the uterus was removed we returned to remove this portion. The vagina was closed with interrupted suture of 2-0 PDS. The case was then handed over to Dr. Monroy with the Urology team. He and I did discuss the patient's other bladder concerns which had been long-standing but worsening in recent months. We reviewed her CT images and history together and through this discussion and my knowledge of the patients' symptomatic concerns we elected to proceed with suprapubic catheter placement in conjunction with the cystotomy repair. Dr. Monroy then performed the cystotomy repair, suprapubic catheter placement, and omental flap, see separate documentation for details. An omental biopsy was taken during this. A Nabeel channel drain was placed in the pelvis posterior to the bladder and pulled through the RLQ prior port site incision and secured with a drain stitch. The fascial incision was closed with 2-0 PDS and the subcutaneous tissue was reapproximated. The skin was closed with suture. The port sites were closed and skin glue placed. A bulb suction was placed on the drain and the catheter was attached to a drainage bag. All counts were correct prior to closure. The patient was awakened and taken to recovery in stable condition.     Dany Perez MD  Gynecologic Oncology

## 2024-07-12 NOTE — DISCHARGE SUMMARY
Gynecologic Oncology Discharge Summary    Alis Hartman  [id number]    Admit Date: 7/12/2024  Discharge Date: 7/17/2024  Admitting Provider: Dr. Dany Hyde MD  Discharge Provider: Dr. Santos Zamorano MD    Admission Dx:   -Uterine sarcoma  -Hx of cervical cancer  -Hx of pelvic radiation therapy  -Bilateral hydroureteronephrosis  -Urinary incontinence  -Elevated creatinine  -Radiation fibrosis  -Atrial fibrillation  -HTN  -S/p gastric gypass  -CKD w/ recent MADHU  -Chronic pain  -OA  -LE edema  -Paranoid schizophrenia    Discharge Dx:  -Uterine sarcoma  -Hx of cervical cancer  -Hx of pelvic radiation therapy  -Bilateral hydroureteronephrosis  -Urinary incontinence  -Elevated creatinine  -Radiation fibrosis  -Atrial fibrillation  -HTN  -S/p gastric gypass  -CKD w/ recent MADHU  -Chronic pain  -OA  -LE edema  -Paranoid schizophrenia  - Suprapubic catheter placement  - Urethral kent in place    Patient Active Problem List   Diagnosis    HSIL on Pap smear    Cervical cancer (H)    History of urinary tract infection    Vaginal dysplasia    VAIN III (vaginal intraepithelial neoplasia III)    NO SHOW    Carpal tunnel syndrome    Chronic low back pain    Gastric bypass status for obesity    Generalized anxiety disorder    Iron deficiency anemia    Knee pain    Opiate dependence, continuous (H)    Obesity    Osteoarthrosis    Paranoid schizophrenia, chronic condition (H)    Peripheral edema    Vitamin B12 deficiency    Vitamin D deficiency    Stage 3a chronic kidney disease (H)    Anemia due to chronic kidney disease    Thrombocytosis    Benign essential hypertension    Pre-operative cardiovascular examination    Paroxysmal atrial fibrillation (H)    S/P hysterectomy       Procedures:   -  dx lap conversion to open, SNEHA, left salpingo-oophorectomy, omentum biopsy, omental flap, open bladder repair, suprapubic catheter insertion     Prior to Admission Medications:  Medications Prior to Admission   Medication Sig  Dispense Refill Last Dose    acetaminophen (TYLENOL) 325 MG tablet Take 2 tablets (650 mg) by mouth every 6 hours as needed for mild pain 24 tablet 0 Unknown    albuterol (PROAIR HFA/PROVENTIL HFA/VENTOLIN HFA) 108 (90 Base) MCG/ACT inhaler Inhale 1-2 puffs into the lungs every 4 hours as needed for shortness of breath   7/11/2024    atenolol (TENORMIN) 100 MG tablet TAKE 1 TABLET(100 MG) BY MOUTH DAILY FOR HIGH BLOOD PRESSURE   7/12/2024 at 030    benztropine (COGENTIN) 1 MG tablet Take 1 tablet by mouth daily       calcium Citrate-vitamin D 500-400 MG-UNIT CHEW Take 1 tablet by mouth   7/11/2024    childrens multivitamin w/iron (FLINTSTONES COMPLETE) 60 MG chewable tablet Take 2 tablets by mouth daily   7/11/2024    cholecalciferol 125 MCG (5000 UT) CAPS Take 5000 mg 4 times a week   7/11/2024    diclofenac (VOLTAREN) 1 % topical gel Apply to: Skin on  Both/All Knee for pain. Topical 1% gel, 4 g applied TOPICALLY to lower extremities 3 times daily PRN ;   7/11/2024    docusate sodium (COLACE) 100 MG capsule Take 100 mg by mouth 2 times daily as needed   7/11/2024    ferrous sulfate (CASSANDRA-IN-SOL) 75 (15 FE) MG/ML oral drops Take 15 mg by mouth daily   7/11/2024    lidocaine (XYLOCAINE) 5 % external ointment Apply 1 Application topically as needed   7/11/2024    NARCAN 4 MG/0.1ML nasal spray TK UTD PER PACKAGE INSERT  0 7/11/2024    phenazopyridine (PYRIDIUM) 100 MG tablet Take 1 tablet (100 mg) by mouth 3 times daily as needed for urinary tract discomfort 90 tablet 3     QUEtiapine (SEROQUEL) 400 MG tablet Take 400 mg by mouth at bedtime   7/11/2024    sertraline (ZOLOFT) 25 MG tablet Take 1 tablet by mouth daily.   7/11/2024    triamcinolone (KENALOG) 0.1 % external ointment Apply a pea sized amount to the vulva and vaginal opening as needed for irritation. May mix with nystatin, but do not use more than 1x daily 30 g 3 Unknown    zolpidem (AMBIEN) 10 MG tablet Take 10 mg by mouth nightly as needed   7/11/2024     [DISCONTINUED] amLODIPine (NORVASC) 5 MG tablet Take 10 mg by mouth daily   7/11/2024    [DISCONTINUED] apixaban ANTICOAGULANT (ELIQUIS) 5 MG tablet Take 1 tablet (5 mg) by mouth 2 times daily 60 tablet 2     [DISCONTINUED] estradiol (ESTRACE) 0.1 MG/GM vaginal cream Place vaginally twice a week 42.5 g 3 7/11/2024    [DISCONTINUED] furosemide (LASIX) 40 MG tablet Take 40 mg by mouth daily   7/11/2024    [DISCONTINUED] hydrochlorothiazide (HYDRODIURIL) 25 MG tablet Take 25 mg by mouth   7/11/2024    [DISCONTINUED] liraglutide - Weight Management (SAXENDA) 18 MG/3ML pen Inject 2.4 mg subcutaneously   7/11/2024    [DISCONTINUED] losartan (COZAAR) 50 MG tablet Take 50 mg by mouth daily   7/11/2024    [DISCONTINUED] oxyCODONE (ROXICODONE) 5 MG tablet Take 2 tablets (10 mg) by mouth every 6 hours as needed for severe pain 30 tablet 0 7/11/2024    [DISCONTINUED] oxyCODONE IR (ROXICODONE) 10 MG tablet Take 1 tablet (10 mg) by mouth 3 times daily as needed Do not take while taking Vicodin.   7/12/2024 at 0830    [DISCONTINUED] tolterodine ER (DETROL LA) 4 MG 24 hr capsule Take 1 capsule (4 mg) by mouth daily 90 capsule 0 7/11/2024       Discharge Medications:     Review of your medicines        START taking        Dose / Directions   oxyBUTYnin 5 MG tablet  Commonly known as: DITROPAN  Used for: Bladder spasm      Dose: 5 mg  Take 1 tablet (5 mg) by mouth 3 times daily for 13 days  Quantity: 39 tablet  Refills: 0     senna-docusate 8.6-50 MG tablet  Commonly known as: SENOKOT-S/PERICOLACE      Dose: 1 tablet  Take 1 tablet by mouth 2 times daily  Quantity: 28 tablet  Refills: 0            CONTINUE these medicines which may have CHANGED, or have new prescriptions. If we are uncertain of the size of tablets/capsules you have at home, strength may be listed as something that might have changed.        Dose / Directions   apixaban ANTICOAGULANT 2.5 MG tablet  Commonly known as: ELIQUIS  Indication: Atrial Fibrillation Not Caused  By A Heart Valve Problem  This may have changed:   medication strength  how much to take  Used for: Paroxysmal atrial fibrillation (H), Malignant neoplasm of overlapping sites of cervix (H)      Dose: 2.5 mg  Take 1 tablet (2.5 mg) by mouth 2 times daily  Quantity: 20 tablet  Refills: 0     furosemide 20 MG tablet  Commonly known as: LASIX  This may have changed:   medication strength  how much to take  Used for: Hypertension, unspecified type      Dose: 20 mg  Take 1 tablet (20 mg) by mouth daily  Quantity: 14 tablet  Refills: 0     * Narcan 4 MG/0.1ML nasal spray  This may have changed: Another medication with the same name was added. Make sure you understand how and when to take each.  Generic drug: naloxone      TK UTD PER PACKAGE INSERT  Refills: 0     * naloxone 4 MG/0.1ML nasal spray  Commonly known as: NARCAN  This may have changed: You were already taking a medication with the same name, and this prescription was added. Make sure you understand how and when to take each.  Used for: Malignant neoplasm of overlapping sites of cervix (H)      Dose: 4 mg  Spray 1 spray (4 mg) into one nostril alternating nostrils as needed for opioid reversal every 2-3 minutes until assistance arrives  Quantity: 2 each  Refills: 0     oxyCODONE 5 MG tablet  Commonly known as: ROXICODONE  This may have changed:   medication strength  how much to take  when to take this  reasons to take this  additional instructions  Another medication with the same name was removed. Continue taking this medication, and follow the directions you see here.      Dose: 5 mg  Take 1 tablet (5 mg) by mouth every 4 hours as needed for breakthrough pain (pain control or improvement in physical function. Hold dose for analgesic side effects.)  Quantity: 70 tablet  Refills: 0           * This list has 2 medication(s) that are the same as other medications prescribed for you. Read the directions carefully, and ask your doctor or other care provider to  review them with you.                CONTINUE these medicines which have NOT CHANGED        Dose / Directions   acetaminophen 325 MG tablet  Commonly known as: TYLENOL  Used for: Malignant neoplasm of overlapping sites of cervix (H)      Dose: 650 mg  Take 2 tablets (650 mg) by mouth every 6 hours as needed for mild pain  Quantity: 24 tablet  Refills: 0     albuterol 108 (90 Base) MCG/ACT inhaler  Commonly known as: PROAIR HFA/PROVENTIL HFA/VENTOLIN HFA      Dose: 1-2 puff  Inhale 1-2 puffs into the lungs every 4 hours as needed for shortness of breath  Refills: 0     atenolol 100 MG tablet  Commonly known as: TENORMIN      TAKE 1 TABLET(100 MG) BY MOUTH DAILY FOR HIGH BLOOD PRESSURE  Refills: 0     benztropine 1 MG tablet  Commonly known as: COGENTIN      Dose: 1 tablet  Take 1 tablet by mouth daily  Refills: 0     calcium Citrate-vitamin D 500-400 MG-UNIT Chew      Dose: 1 tablet  Take 1 tablet by mouth  Refills: 0     childrens multivitamin w/iron 60 MG chewable tablet      Dose: 2 tablet  Take 2 tablets by mouth daily  Refills: 0     cholecalciferol 125 MCG (5000 UT) Caps      Take 5000 mg 4 times a week  Refills: 0     diclofenac 1 % topical gel  Commonly known as: VOLTAREN      Apply to: Skin on  Both/All Knee for pain. Topical 1% gel, 4 g applied TOPICALLY to lower extremities 3 times daily PRN ;  Refills: 0     docusate sodium 100 MG capsule  Commonly known as: COLACE      Dose: 100 mg  Take 100 mg by mouth 2 times daily as needed  Refills: 0     ferrous sulfate 75 (15 FE) MG/ML oral drops  Commonly known as: CASSANDRA-IN-SOL      Dose: 15 mg  Take 15 mg by mouth daily  Refills: 0     lidocaine 5 % external ointment  Commonly known as: XYLOCAINE      Dose: 1 Application.  Apply 1 Application topically as needed  Refills: 0     phenazopyridine 100 MG tablet  Commonly known as: PYRIDIUM  Used for: Dysuria      Dose: 100 mg  Take 1 tablet (100 mg) by mouth 3 times daily as needed for urinary tract  discomfort  Quantity: 90 tablet  Refills: 3     QUEtiapine 400 MG tablet  Commonly known as: SEROquel      Dose: 400 mg  Take 400 mg by mouth at bedtime  Refills: 0     sertraline 25 MG tablet  Commonly known as: ZOLOFT      Dose: 1 tablet  Take 1 tablet by mouth daily.  Refills: 0     triamcinolone 0.1 % external ointment  Commonly known as: KENALOG  Used for: Vulvar irritation      Apply a pea sized amount to the vulva and vaginal opening as needed for irritation. May mix with nystatin, but do not use more than 1x daily  Quantity: 30 g  Refills: 3     zolpidem 10 MG tablet  Commonly known as: AMBIEN      Dose: 10 mg  Take 10 mg by mouth nightly as needed  Refills: 0            STOP taking      amLODIPine 5 MG tablet  Commonly known as: NORVASC        estradiol 0.1 MG/GM vaginal cream  Commonly known as: ESTRACE        hydrochlorothiazide 25 MG tablet  Commonly known as: HYDRODIURIL        losartan 50 MG tablet  Commonly known as: COZAAR        Saxenda 18 MG/3ML pen  Generic drug: liraglutide - Weight Management        tolterodine ER 4 MG 24 hr capsule  Commonly known as: DETROL LA                  Where to get your medicines        These medications were sent to [facility] - [place], [place] - [address]  [address], [facility] [address]      Phone: [phone]   apixaban ANTICOAGULANT 2.5 MG tablet  furosemide 20 MG tablet  naloxone 4 MG/0.1ML nasal spray  oxyBUTYnin 5 MG tablet  oxyCODONE 5 MG tablet  senna-docusate 8.6-50 MG tablet         Consultations:  - Urology  -     Brief History of Illness:  Alis Hartman is a 69 year old with a serous uterine CA with a history of cervical cancer treated with pelvic radiation therapy and a history of VaIN treated with LASER. She was admitted for surgical management.    Hospital Course:  Dz:   - Preoperative diagnosis was serous uterine carcinoma.  Frozen section of endometrial curretings at the time of the surgery showed  endometrial serous carcinoma with extensive necrosis.  Final pathology is pending at the time of discharge. P16+, negative HER2. Intact MMR protein expression. A TINY drain was left in place given elevated urine Cr : serum Cr, per urology. She will follow-up postoperatively for a care plan with urology and gyn onc.   FEN:   - She was maintained on IVF until POD#1, when her diet was slowly advanced.  By discharge, she was tolerating a regular diet without nausea and vomiting and able to maintain her hydration without IVF supplementation.  Pain:   - Her pain was initially controlled on IV pain medications.  Once tolerating PO pain meds, she was transitioned to a PO pain regimen.  Her pain was well controlled on this and she was discharged home with these medications. She was discharged home with oxycodone for 1 week with a plan for call the clinic if she needs a refill.   CV:   - She has a history of atrial fibrillation and hypertension. Her HTN medications (atenolol, amlodipine, losartan, and lasix) and apixiban were held pre and post operatively. Her atenolol was restarted at her PTA dosage as her pressures improved and UOP improved. Her lasix was restarted at half her PTA dosage. Her amlodipine, hydrochlorothiazide, and losartan were held post operatively and at the time of discharge. Her apixiban was restart at a DVT prophylactic dose with plans to increase to her baseline therapeutic dose 2 weeks after surgery. Her vital signs were stable while in house and she had no acute CV issues. These medication changes were discussed with the patient's daughter on 7/16 and stated in discharge instructions. She will follow up with her PCP 1 week after discharge for medication management. Clinic aware and will call her on 7/17.  PULM:   - She has no history of pulmonary issues.  She was initially given O2 supplementation in to maintain her O2 sats in the immediate postop period and was transitioned off of this without  "difficulty.  By discharge, her O2 sats were greater than 94% on RA.  She was encouraged to use her bedside IS while in house.  She had no acute pulmonary issues while in house.   HEME:   - She had an appropriate drop in her hemoglobin after surgery and it was stable at the time of discharge.  She had no acute heme issues while in house.  GI:   - She was made NPO prior to the procedure.  On POD#0, her diet was advanced slowly as tolerated.  At the time of discharge, she was tolerating a regular diet without nausea and vomiting.  She was passing flatus prior to discharge. She will be discharged with a bowel regimen to prevent constipation in the postoperative period.  She had no acute GI issues while in house.   :    - A kent catheter was placed at the time of the surgery. Intraoperatively, during the development of the bladder plane, the \"almost immediately entered the bladder between the cervix and the bladder. The bladder was significantly thickened and fibrotic on its anterior and superior aspect but the posterior wall was quite thin.\"  Urology repaired the cystotomy with placement of a suprapubic catheter given patients pre-operative  concerns and CT imaging. She developed an MADHU with low UOP on POD#2, most likely related to a pre-renal etiology given low blood pressures. UOP improved with lasix. Post-renal source was explored given recent bladder surgery. Renal US with no hydronephrosis. FeUrea suggestive of pre-renal source. At the time of discharge, Urology recommended maintaining both catheters with plans to reassess at her first follow up appointment. Given her radiation history and small high pressure bladder with incontinence pre-operatively, plan will be to keep the suprapubic catheter for long-term. On POD#3, her dressings were saturated around her suprapubic and drain site. Urology noted this is most likely urine from a kinked catheter and not infectious given exam findings. Dressings changed and " urology started patient on oxybutynin for bladder spasms. Urology wants oxybutynin held 24 hours before her post op appointment for kent removal. Urology will schedule follow up appointment.   ID:   - The patient was AF during her hospitalization.  She received standard preoperative antibiotics without incident.    ENDO:   - No issues  PSYCH/NEURO:   - She has a paranoid schizophrenia and was continued on her home regimen of seroquel, zoloft, cogentin, visttaril, ambien. No issues while admitted.   PPX:    -  She was given SCDs, IS, and apixiban during her hospital course.  She tolerated these prophylactic interventions without incident.  At discharge, she will be continued on her apixiban at a DVT prophylaxis dosage (2.5mg BID) and up-titrated to her therapeutic dosage (5mg BID) at 2 weeks post op.     Discharge Instructions and Follow up:  Ms. Alis Hartman was discharged from the hospital.  - Follow up with  Dr. Perez on 8/20.  - Follow up with PCP (Dr. Eckert) next week. Gretchen (scheduling) will call her on 7/17 to finalize appointment for next week. Dr. Eckert aware.    - Follow up with Urology in 2-3 weeks. Urology to schedule.   - Follow up with cardiology on 8/20.     Discharge Diet: Regular  Discharge Activity: Lifting restricted to 15 pounds, no driving while on narcotics, nothing per vagina for 6 weeks.    Discharge Disposition:  Discharged to home    Discharge Staff: MD Heather Weller M.D.  [facility]  Obstetrics and Gynecology, PGY1  7/17/2024    Addendum 7/23/24:    I personally reviewed the following pathology consistent with endometrial serous carcinoma with cervical remnant positive for serous carcinoma:     07/12/24: Surgical Pathology Exam:  A. Pelvic mass, excision:  - Hyalinized and calcified fat necrosis  - Negative for malignancy     B. Uterus, cervix, left fallopian tube and ovary, hysterectomy with left salpingo-oophorectomy:  -  Endometrial serous carcinoma, MMR-intact/r64-edvcriep/Her2-negative  - Uterine serosal fibrous adhesions  - Left chronic salpingitis  - Ovary with inclusion cysts, negative for malignancy     C. Cervical Remnant:  - Necrotic cervical tissue, positive for serous carcinoma     D. Omentum, biopsy:  - Fibroadipose tissue with no significant histologic abnormality

## 2024-07-12 NOTE — PROGRESS NOTES
On 7/10 patient was scheduled for a Nuclear Stress Test. She was able to get through the entire test besides the last set of images (stress images). The stress test portion was completed, but the stress images were not able to be acquired due to the patient having a panic attack due to her claustrophobia. She was not able to calm herself down and therefore the stress images were never obtained.

## 2024-07-12 NOTE — LETTER
Alis Hartman MRN# [id number]   YOB: 1955 Age: 69 year old     Date of Admission:  7/12/24  Date of Discharge:  7/16/2024  Admitting Physician:  Dany Perez MD    Primary Care Provider: Maria Fernanda Eckert     To Whom it May Concern:            You have been identified as the Primary Care Provider for Alis Hartman, who was recently admitted to the [facility].  Thank you for the referral to our hospital.  It is our goal to provide the highest quality of care for our patients, including planning for seamless continuity of care by providing you with timely, accurate and concise information.  After reviewing the following combined discharge summary and final progress note, please contact us if you have any remaining questions.  The Discharging Physician will be the best informed, with their contact information listed above.  If unable to reach them, or if you have received this letter in error, please call [phone] and someone will try to help you.

## 2024-07-12 NOTE — ANESTHESIA PROCEDURE NOTES
Arterial Line Procedure Note    Pre-Procedure   Staff -        Anesthesiologist:  Matt Whaley MD       Performed By: anesthesiologist       Pre-Anesthestic Checklist: patient identified, IV checked, risks and benefits discussed, informed consent, monitors and equipment checked, pre-op evaluation and at physician/surgeon's request  Timeout:       Correct Patient: Yes        Correct Procedure: Yes        Correct Site: Yes        Correct Position: Yes   Line Placement:   This line was placed Post Induction  Procedure   Procedure: arterial line       Laterality: left       Insertion Site: radial.  Sterile Prep        Standard elements of sterile barrier followed       Skin prep: Chloraprep  Insertion/Injection        Technique: ultrasound guided        1. Ultrasound was used to evaluate the access site.       2. Artery evaluated via ultrasound for patency/adequacy.       3. Using real-time ultrasound the needle/catheter was observed entering the artery/vein.       Catheter Type/Size: 20 G, 1.75 in/4.5 cm quick cath (integral wire)  Narrative         Secured by: other       Tegaderm dressing used.       Complications: None apparent,        Arterial waveform: Yes        IBP within 10% of NIBP: Yes

## 2024-07-12 NOTE — BRIEF OP NOTE
[facility] - Brief Operative Note    Pre-operative diagnosis:   Serous carcinoma of the uterus  History of cervical cancer   History of pelvic radiation therapy  Bilateral hydroureteronephrosis  Urinary incontinence  Elevated creatinine    Post-operative diagnosis:   Same  Radiation fibrosis  Cystotomy    Procedure(s):  Diagnostic laparoscopy converted to exploratory laparotomy, total abdominal hysterectomy, bilateral salpingo-oophorectomy, lysis of adhesions >60 min  Repair of cystotomy  Insertion of supra-pubic catheter     Surgeons and Role:     * Dany Perez MD - Primary     * Black Monroy MD - Assisting     * Odilia Helm MD - Resident - Assisting     * Yeny Wolf MD - Resident - Assisting     * Abigail Melo MD - Resident - Assisting     * Anatoly Avina MD - Fellow - Assisting    Anesthesia:   General     EBL: 50ml from our portion    Drains: None    Specimen(s):  ID Type Source Tests Collected by Time Destination   1 : pelvic washings Washings Pelvis NON-GYNECOLOGIC CYTOLOGY Dany Perez MD 7/12/2024  2:28 PM    2 : Pelvic Calcified mass Tissue Pelvis SURGICAL PATHOLOGY EXAM Dany Perez MD 7/12/2024  3:16 PM    3 : Uterus, cervix, left fallopian tube and ovary Tissue Uterus, Left Fallopian Tube and Ovary SURGICAL PATHOLOGY EXAM Dany Perez MD 7/12/2024  3:35 PM    4 : Cervical Remnant Tissue Cervix SURGICAL PATHOLOGY EXAM Dany Perez MD 7/12/2024  3:43 PM    5 : Omentum biopsy Biopsy Omentum SURGICAL PATHOLOGY EXAM Dany Perez MD 7/12/2024  5:00 PM        Findings: Vaginal agglutination with no overt cervix but 1cm opening into the uterine cavity with drainage of bloody fluid. Constant urinary leakage from the urethra. Upon entry into the abdomen, the pelvic tissues are pale c/w prior radiation therapy. The right tube and ovary are surgically absent. The left tube and ovary were normal in appearance. Uterus overall normal. In the  posterior cul-de-sac was a calcified mass which was not attached to any other structure. Tissue was fibrotic and friable. The bladder was densely adherent to the anterior lower uterus and cervix and extended bilaterally up to nearly the level of the round ligaments. Due to inability to dissect the bladder, the decision was made to convert to open procedure. Prior to this we backfilled the bladder with methylene blue and small defect was noted but not visible.     Complications: None apparent, there was a cystotomy which was inherent to the risksof the procedure    Implants: None     Disposition: Stable to PACU

## 2024-07-12 NOTE — PROGRESS NOTES
Gynecologic Oncology Postoperative Progress Note  7/12/2024    S: Patient reports she is doing okay postoperatively. Pain is best right after medications, unsure about her next dose timing. Ambulating without difficulty. Voiding spontaneously. Tolerating PO without nausea or vomiting although has only had liquids thus far. Denies chest pain, shortness of breath, dizziness, or other concerns at this time.     O:  Vitals:    07/12/24 2102 07/12/24 2143 07/12/24 2228 07/12/24 2229   BP:  132/73 (!) 146/79 138/82   BP Location:  Right arm Right arm Right arm   Pulse:  63 63 60   Resp:  11 11 15   Temp:  97.4  F (36.3  C) 97.7  F (36.5  C)    TempSrc:  Oral Oral    SpO2: 100% 100% 100% 100%   Weight:       Height:           Gen: NAD  Cardio: RRR, no murmurs  Resp: CTAB, non-labored breathing  Abdomen: Soft, appropriately tender, incision c/d/i  Extremities: Non-tender, SCDs in place    A: Alis Hartman is a 69 year old POD#0 s/p dx lap conversion to open, SNEHA, left salpingo-oophorectomy, omentum biopsy, omental flap, open bladder repair, suprapubic catheter insertion . Doing well. Appropriate for admission to floor.      Dz: Uterine serous carcinoma, Hx of cervical cancer, Hx of pelvic radiation    #Postoperative state:  - FEN: ADAT' PRN antiemetics  - Pain: PTA oxy 10 mg QID  - Heme: 7.7 > EBL 50 from gyn onc portion > 7.2  - kent in place + suprapubic kent  - TINY Drain in place    #Bladder injury intraoperative s/p bladder repair  - Urology consulted in OR & following    #Atrial fibrillation  - Telemetry    #HTN  - Hold PTA amlodipine and losartan  - Continue PTA atenolol    #S/p gastric bypass  - PRN antiemetics  - Bowel regimen    #CKD w/ recent MADHU  #History of urinary retention  #Urinary incontinence  - Preop Cr 1.64  - bottom kent + suprapubic kent in place    #Chronic pain  #OA  - Continue PTA oxy    #LE edema  - Continue PTA lasix    #Paranoid schizophrenia  - Continue PTA seorquel, zoloft, cogentin,  vistaril, ambien    #PPx:   - SCDs, intra-op ancef, and preop heparin    Dispo: Admission to floor      Dany Lopez MD  [facility]  Gynecology Oncology Resident, PGY-2  07/12/2024 11:11 PM    To reach the GYNECOLOGY ONCOLOGY team for this patient, please page [phone]

## 2024-07-12 NOTE — ANESTHESIA PROCEDURE NOTES
TAP Procedure Note    Pre-Procedure   Staff -        Anesthesiologist:  Avery Washburn MD       Resident/Fellow: Matthew Ryan MD       Performed By: resident       Location: OR       Procedure Start/Stop Times: 7/12/2024 1:45 PM and 7/12/2024 2:00 PM       Pre-Anesthestic Checklist: patient identified, IV checked, site marked, risks and benefits discussed, informed consent, monitors and equipment checked, pre-op evaluation, at physician/surgeon's request and post-op pain management  Timeout:       Correct Patient: Yes        Correct Procedure: Yes        Correct Site: Yes        Correct Position: Yes        Correct Laterality: Yes        Site Marked: Yes  Procedure Documentation  Procedure: TAP       Laterality: bilateral       Patient Position: supine       Patient Prep/Sterile Barriers: sterile gloves, mask       Skin prep: Chloraprep       Needle Type: short bevel       Needle Gauge: 21.        Needle Length (millimeters): 110        Ultrasound guided       1. Ultrasound was used to identify targeted nerve, plexus, vascular marker, or fascial plane and place a needle adjacent to it in real-time.       2. Ultrasound was used to visualize the spread of anesthetic in close proximity to the above referenced structure.       3. A permanent image is entered into the patient's record.    Assessment/Narrative         The placement was negative for: blood aspirated, painful injection and site bleeding       Paresthesias: No.       Bolus given via needle. no blood aspirated via catheter.        Secured via.        Insertion/Infusion Method: Single Shot       Complications: none    Medication(s) Administered   Bupivacaine 0.25% w/ 1:200K Epi (Injection) - Injection   40 mL - 7/12/2024 1:45:00 PM  Bupivacaine 0.25% PF (Infiltration) - Infiltration   10 mL - 7/12/2024 1:45:00 PM  Dexmedetomidine 4 mcg/mL (Perineural) - Perineural   40 mcg - 7/12/2024 1:45:00 PM  Dexamethasone 10 mg/mL PF (Perineural) -  "Perineural   2 mg - 7/12/2024 1:45:00 PM  Medication Administration Time: 7/12/2024 1:45 PM      FOR [facility] ONLY:   Pain Team Contact information: please page the Pain Team Via [employer]. Search \"Pain\". During daytime hours, please page the attending first. At night please page the resident first.      "

## 2024-07-12 NOTE — H&P
Gynecologic Oncology Clinic - Pre-operative History & Physical    Patient: Alis Hartman  : 1955    Date of Visit: 2024     Reason for visit: pre-operative H&P    History of Present Illness:  Aranza is a 69 year old patient with a history of cervical cancer treated with radiation therapy many decades ago. She now has a history notable for schizophrenia, hypertension, single episode of atrial fibrillation with spontaneous resolution, hydroureteronephrosis thought secondary to incomplete bladder emptying and overflow incontinence who presented with uterine mass which was found to be serous uterine carcinoma after D&C. She presented for definitive surgery.    She underwent stress test this week. Imaging wasn't able to be completed due to patient anxiety, however, EKG showed no signs of inducible ischemia with pharmacologic stress.       Past Medical History:   Diagnosis Date    Atrial fibrillation (H)     Depression     Hypertension     Malignant neoplasm of endocervix (H)     Tx with radiation    Other chronic pain     Low back    Schizophrenia (H)     Urinary incontinence        Past Surgical History:   Procedure Laterality Date    ABDOMINOPLASTY      BIOPSY CERVICAL, LOCAL EXCISION, SINGLE/MULTIPLE  10/26/2011    Procedure:BIOPSY CERVICAL, LOCAL EXCISION, SINGLE/MULTIPLE; EUA, cervical biopsies; Surgeon:BETTY TINEO; Location:[facility] OR    BIOPSY VAGINAL N/A 2021    Procedure: BIOPSY, VAGINA;  Surgeon: Dany Perez MD;  Location: [facility] OR    CYSTOSCOPY N/A 2024    Procedure: Cystoscopy;  Surgeon: Dany Perez MD;  Location: [facility] OR    DILATION AND CURETTAGE, WITH ULTRASOUND GUIDANCE N/A 2024    Procedure: Dilation and curettage of the uterus with drainage of uterine fluid under ultrasound guidance, lysis of vaginal adhesions;  Surgeon: Dany Perez MD;  Location: [facility] OR    EXAM UNDER ANESTHESIA PELVIC N/A 3/12/2020    Procedure: Exam under anesthsia, vaginal biopsies, possible  "CO2 laser of the vagina;  Surgeon: Lilliam Roy MD;  Location: [facility]    GI SURGERY  2004    gastric bypass    LASER CO2 VAGINA N/A 3/2/2015    Procedure: LASER CO2 VAGINA;  Surgeon: Mariela Abdalla MD;  Location: [facility] OR    LASER CO2 VAGINA N/A 5/24/2019    Procedure: Exam under anesthesia, vaginal biopsies, CO2 laser of the vagina;  Surgeon: Lilliam Roy MD;  Location: [facility] OR    LASER CO2 VAGINA N/A 6/8/2021    Procedure: Exam under anesthesia, laser ablation of the upper vagina;  Surgeon: Dany Perez MD;  Location: [facility] OR        Social History     Tobacco Use    Smoking status: Never    Smokeless tobacco: Never   Substance Use Topics    Alcohol use: Not Currently    Drug use: No        Family History   Problem Relation Age of Onset    Heart Disease Father     Heart Failure Father     No Known Problems Brother     No Known Problems Brother     No Known Problems Brother     Pancreatitis Brother     Hypertension Sister     Peripheral Vascular Disease Sister     No Known Problems Sister     No Known Problems Son     No Known Problems Daughter        No current outpatient medications on file.        Allergies   Allergen Reactions    Ibuprofen Nausea and Vomiting    Shrimp     Sulfa Antibiotics Rash         Physical Exam:   /74 (BP Location: Left arm)   Pulse 63   Temp 99.2  F (37.3  C) (Oral)   Resp 16   Ht 1.6 m (5' 3\")   Wt 70.5 kg (155 lb 6.8 oz)   BMI 27.53 kg/m    General appearance: Alert and oriented, no acute distress, well groomed  CV: Regular rate, rhythm  Lungs: Clear to auscultation bilaterally     Labs/Pathology:   Lab Results   Component Value Date    CASEREPORT  05/17/2024     Surgical Pathology Report                         Case: [id number]                                  Authorizing Provider:  Dany Perez MD         Collected:           05/17/2024 12:04 PM          Ordering Location:     [facility]                 Received:            05/17/2024 01:21 PM "          Pathologist:           Marleni Paul MD                                                           Specimen:    Endometrium, endometrial curretings                                                        FINALDX  05/17/2024     Final Diagnosis   A. endometrial curretings:  -Endometrial serous carcinoma with extensive necrosis   Electronically signed by Marleni Paul MD on 5/22/2024 at 10:15 AM   Comment  [facility]   The carcinoma cells are positive for p16, negative for HER2 (0-1+, 10%).  TP53 is a mutant pattern.  IHC stains revealed intact MMR protein expression.           SYNOPTIC  05/17/2024     Gynecologic Biomarker Reporting Template  GYNECOLOGIC BIOMARKER REPORTING TEMPLATE - All Specimens  Protocol posted: 3/22/2023    TEST(S) PERFORMED     Specimen Type:    Biopsy / curettage      Appropriate Controls Verified:    Yes      HER2 Status:    Negative (score 1+)      Mismatch Repair (MMR) Protein Status:           Nuclear MLH1 Expression:    Intact      Mismatch Repair (MMR) Protein Status:           Nuclear PMS2 Expression:    Intact      Mismatch Repair (MMR) Protein Status:           Nuclear MSH2 Expression:    Intact      Mismatch Repair (MMR) Protein Status:           Nuclear MSH6 Expression:    Intact      Mismatch Repair (MMR) Protein Status:    Background non-neoplastic tissue / internal control shows intact nuclear expression      Immunohistochemistry (IHC) Interpretation for Mismatch Repair (MMR) Proteins:    No loss of nuclear expression of MMR proteins: low probability of microsatellite instability-high (MSI-H) phenotype      p53 Status:    Abnormal expression (mutated)        :    Overexpression (strong, diffuse nuclear expression in greater than 90% of cells)             Imaging:   IMPRESSION:     1. New moderate bilateral hydroureteronephrosis, which may be  secondary to the mass effect of the adjacent enlarged uterus with  marked distention of the endometrial canal by intermediate  density  material, which has increased since CT 4/6/2023. Findings may be  related to cervical stenosis secondary to prior pelvic radiation.     2. Similar left pelvic/perirectal mass with peripheral calcification.      3. Stable 4 mm left lower lobe solitary pulmonary nodule.     4. No evidence of metastatic disease.      Assessment:  Alis Hartman is a 69 year old here for planned Total laparoscopic hysterectomy, bilateral salpingo-oophorectomy with high likelihood of conversion to open laparotomy for serous uterine carcinoma.    Plan:   I previously had a lengthy discussion with the patient about the high risks of surgery due to history of radiation therapy. See note from 5/28/2024 for details. Given her history of pelvic radiation, we have elected to forego lymph node dissection to minimize risks. Again reviewed high risk of vaginal cuff dehiscence and risks for bladder or bowel injury.   Cardiac clearance: has seen cardiology. ekG from stress test showed no evidence of induced ischemia. Nuclear medicine images post-stress were not obtained due to patient anxiety and request to terminate the procedure.   Anemia: blood on call. Transfusion consent completed  History of atrial fibrillation, single episode: is not on anticoagulation. High risk for quang-operative arrhythmia. If admitted will likely do telemetry.   Urinary retention: Given profound hydroureter to the level of the kidney and persistent large volume urinary incontinence, I don't think this is due to either ureteral or urethral obstruction. I think it is most likely secondary to incomplete bladder emptying possibly medication induced with overflow incontinence. Monitor creatinine due to recent MADHU.  Pre-op: proceed with planned procedure.     Dany Perez MD   Gynecologic Oncology

## 2024-07-12 NOTE — ANESTHESIA CARE TRANSFER NOTE
Patient: Alis Hartman    Procedure: Procedure(s):  Diagnostic laparoscopy converted to exploratory laparotomy, total abdominal hysterectomy, bilateral salpingo-oophorectomy, lysis of adhesions >60 min  Repair bladder  Insertion of supra-pubic catheter       Diagnosis: Serous carcinoma of body of uterus (H) [C54.9]  Diagnosis Additional Information: No value filed.    Anesthesia Type:   General     Note:    Oropharynx: oropharynx clear of all foreign objects and spontaneously breathing  Level of Consciousness: drowsy  Oxygen Supplementation: face mask  Level of Supplemental Oxygen (L/min / FiO2): 6  Independent Airway: airway patency satisfactory and stable  Dentition: dentition unchanged  Vital Signs Stable: post-procedure vital signs reviewed and stable  Report to RN Given: handoff report given  Patient transferred to: PACU    Handoff Report: Identifed the Patient, Identified the Reponsible Provider, Reviewed the pertinent medical history, Discussed the surgical course, Reviewed Intra-OP anesthesia mangement and issues during anesthesia, Set expectations for post-procedure period and Allowed opportunity for questions and acknowledgement of understanding      Vitals:  Vitals Value Taken Time   /68    Temp     Pulse 66 07/12/24 1815   Resp 12 07/12/24 1815   SpO2 100 % 07/12/24 1815   Vitals shown include unfiled device data.    Electronically Signed By: ERINN Patel CRNA  July 12, 2024  6:15 PM

## 2024-07-13 LAB
ANION GAP SERPL CALCULATED.3IONS-SCNC: 12 MMOL/L (ref 7–15)
BUN SERPL-MCNC: 18.3 MG/DL (ref 8–23)
CALCIUM SERPL-MCNC: 8.2 MG/DL (ref 8.8–10.2)
CHLORIDE SERPL-SCNC: 99 MMOL/L (ref 98–107)
CREAT SERPL-MCNC: 1.33 MG/DL (ref 0.51–0.95)
DEPRECATED HCO3 PLAS-SCNC: 28 MMOL/L (ref 22–29)
EGFRCR SERPLBLD CKD-EPI 2021: 43 ML/MIN/1.73M2
ERYTHROCYTE [DISTWIDTH] IN BLOOD BY AUTOMATED COUNT: 20.5 % (ref 10–15)
GLUCOSE BLDC GLUCOMTR-MCNC: 128 MG/DL (ref 70–99)
GLUCOSE BLDC GLUCOMTR-MCNC: 160 MG/DL (ref 70–99)
GLUCOSE SERPL-MCNC: 140 MG/DL (ref 70–99)
HCT VFR BLD AUTO: 24.8 % (ref 35–47)
HGB BLD-MCNC: 7.2 G/DL (ref 11.7–15.7)
MCH RBC QN AUTO: 21.2 PG (ref 26.5–33)
MCHC RBC AUTO-ENTMCNC: 29 G/DL (ref 31.5–36.5)
MCV RBC AUTO: 73 FL (ref 78–100)
PLATELET # BLD AUTO: 347 10E3/UL (ref 150–450)
POTASSIUM SERPL-SCNC: 3.2 MMOL/L (ref 3.4–5.3)
RBC # BLD AUTO: 3.39 10E6/UL (ref 3.8–5.2)
SODIUM SERPL-SCNC: 139 MMOL/L (ref 135–145)
WBC # BLD AUTO: 12.9 10E3/UL (ref 4–11)

## 2024-07-13 PROCEDURE — 85027 COMPLETE CBC AUTOMATED: CPT

## 2024-07-13 PROCEDURE — 80048 BASIC METABOLIC PNL TOTAL CA: CPT

## 2024-07-13 PROCEDURE — 250N000013 HC RX MED GY IP 250 OP 250 PS 637: Performed by: OBSTETRICS & GYNECOLOGY

## 2024-07-13 PROCEDURE — 120N000002 HC R&B MED SURG/OB UMMC

## 2024-07-13 PROCEDURE — 250N000013 HC RX MED GY IP 250 OP 250 PS 637

## 2024-07-13 PROCEDURE — 258N000003 HC RX IP 258 OP 636: Performed by: OBSTETRICS & GYNECOLOGY

## 2024-07-13 PROCEDURE — 36415 COLL VENOUS BLD VENIPUNCTURE: CPT

## 2024-07-13 PROCEDURE — 250N000011 HC RX IP 250 OP 636

## 2024-07-13 PROCEDURE — 99024 POSTOP FOLLOW-UP VISIT: CPT | Performed by: OBSTETRICS & GYNECOLOGY

## 2024-07-13 RX ORDER — AMLODIPINE BESYLATE 10 MG/1
10 TABLET ORAL DAILY
Status: DISCONTINUED | OUTPATIENT
Start: 2024-07-13 | End: 2024-07-17 | Stop reason: HOSPADM

## 2024-07-13 RX ORDER — HYDROCHLOROTHIAZIDE 25 MG/1
25 TABLET ORAL
Status: CANCELLED | OUTPATIENT
Start: 2024-07-13

## 2024-07-13 RX ORDER — ENOXAPARIN SODIUM 100 MG/ML
40 INJECTION SUBCUTANEOUS EVERY 24 HOURS
Status: DISCONTINUED | OUTPATIENT
Start: 2024-07-13 | End: 2024-07-14

## 2024-07-13 RX ADMIN — SENNOSIDES AND DOCUSATE SODIUM 2 TABLET: 50; 8.6 TABLET ORAL at 20:06

## 2024-07-13 RX ADMIN — SENNOSIDES AND DOCUSATE SODIUM 1 TABLET: 50; 8.6 TABLET ORAL at 08:12

## 2024-07-13 RX ADMIN — ACETAMINOPHEN 650 MG: 325 TABLET, FILM COATED ORAL at 16:48

## 2024-07-13 RX ADMIN — ATENOLOL 100 MG: 25 TABLET ORAL at 08:13

## 2024-07-13 RX ADMIN — ACETAMINOPHEN 650 MG: 325 TABLET, FILM COATED ORAL at 23:47

## 2024-07-13 RX ADMIN — OXYCODONE HYDROCHLORIDE 5 MG: 5 TABLET ORAL at 17:08

## 2024-07-13 RX ADMIN — FAMOTIDINE 20 MG: 20 TABLET ORAL at 08:00

## 2024-07-13 RX ADMIN — SODIUM CHLORIDE, POTASSIUM CHLORIDE, SODIUM LACTATE AND CALCIUM CHLORIDE: 600; 310; 30; 20 INJECTION, SOLUTION INTRAVENOUS at 14:00

## 2024-07-13 RX ADMIN — SODIUM CHLORIDE, POTASSIUM CHLORIDE, SODIUM LACTATE AND CALCIUM CHLORIDE: 600; 310; 30; 20 INJECTION, SOLUTION INTRAVENOUS at 23:46

## 2024-07-13 RX ADMIN — SODIUM CHLORIDE, POTASSIUM CHLORIDE, SODIUM LACTATE AND CALCIUM CHLORIDE: 600; 310; 30; 20 INJECTION, SOLUTION INTRAVENOUS at 03:57

## 2024-07-13 RX ADMIN — OXYCODONE HYDROCHLORIDE 10 MG: 5 TABLET ORAL at 08:13

## 2024-07-13 RX ADMIN — ACETAMINOPHEN 650 MG: 325 TABLET, FILM COATED ORAL at 04:42

## 2024-07-13 RX ADMIN — ACETAMINOPHEN 650 MG: 325 TABLET, FILM COATED ORAL at 10:47

## 2024-07-13 RX ADMIN — OXYCODONE HYDROCHLORIDE 5 MG: 5 TABLET ORAL at 23:48

## 2024-07-13 RX ADMIN — AMLODIPINE BESYLATE 10 MG: 10 TABLET ORAL at 04:42

## 2024-07-13 RX ADMIN — SERTRALINE HYDROCHLORIDE 25 MG: 25 TABLET ORAL at 08:00

## 2024-07-13 RX ADMIN — ENOXAPARIN SODIUM 40 MG: 40 INJECTION SUBCUTANEOUS at 12:30

## 2024-07-13 RX ADMIN — OXYCODONE HYDROCHLORIDE 5 MG: 5 TABLET ORAL at 12:36

## 2024-07-13 ASSESSMENT — ACTIVITIES OF DAILY LIVING (ADL)
ADLS_ACUITY_SCORE: 45

## 2024-07-13 NOTE — PLAN OF CARE
Goal Outcome Evaluation:    POD# 0 s/p Ex lap, SNEHA, BSO. Patient is A&O x 4, pain well controlled with Oxycodone and Tylenol, denies nausea. Tolerating sips of water. PIV infusing. TINY in place. Shahid and SP catheter patent. SCDs in place, IS at bedside. Continuous capnography. On telemetry monitoring.     Update: low temp despite warm blankets, /85. MD notified about temp and BP. Amlodipine given as ordered. TERRIE Solorzano on with good results.

## 2024-07-13 NOTE — PROVIDER NOTIFICATION
Gyn/Onc Resident paged around 1800 -   FYI urine output of 70 mL over 5 hrs and urine is quite pink/concentrated. Also wondering if PT/OT could be ordered for this patient. Awaiting call back.

## 2024-07-13 NOTE — PROGRESS NOTES
"[facility]  Gyn Onc Brief Progress Note    Paged by RN pt temp 92.6 on axillary read; callback to RN requesting sublingual however this is not working as patient is chewing ice, to bedside to assess    S: pt feeling \"fine\", unsure why we are checking on her. Denies feeling cold, chills, muscle, aches, unwell in general. Wanting to watch her TV.    O:  Vitals:    07/13/24 0100 07/13/24 0131 07/13/24 0142 07/13/24 0159   BP: (!) 155/80 (!) 157/83     BP Location: Right arm Right arm     Pulse: 59 59     Resp: 18 19     Temp:   (!) 92.6  F (33.7  C) (!) 93.8  F (34.3  C)   TempSrc:   Axillary Axillary   SpO2: 100% 100%     Weight:       Height:            Gen: NAD  CV: RRR, no MRG  Resp: CTAB  Abd: appropriately tender  Ext: well perfused, radial pulses palpated with ease  Neuro: grossly in tact, aler    A/P: 69 year old POD#1 s/p dx lap conversion to open, SNEHA, left salpingo-oophorectomy, omentum biopsy, omental flap, open bladder repair, suprapubic catheter insertion.     # low temp  - patient well appearing, suspect inaccurate temperature given axial temp taken with a disposable thermometer  - POC BG now  - retime AM labs to now   - heating pad  - no further ice water for pt, room temp or warm water only  - recheck temp in 1 hour    Discussed this plan with RN who endorsed understanding    Dany Lopez MD  [facility]  Gynecology Oncology Resident, PGY-2  07/13/2024 2:10 AM    To reach the GYNECOLOGY ONCOLOGY team for this patient, please page [phone]   "

## 2024-07-13 NOTE — PROGRESS NOTES
"Urology  Progress Note    NAEO  Pain well controlled  Kent in place, SPT in place     Exam  /65 (BP Location: Right arm)   Pulse 65   Temp 97.5  F (36.4  C) (Oral)   Resp 17   Ht 1.6 m (5' 3\")   Wt 70.5 kg (155 lb 6.8 oz)   SpO2 100%   BMI 27.53 kg/m    No acute distress  Unlabored breathing  Abdomen soft,  appropriately tender, nondistended. SPT draining light pink urine  TINY serosanguinous  Kent with light pink urine in tubing      UOP 1710/200  /50  Catheter: 450/150  TINY 45/15    Labs  Recent Labs   Lab Test 07/13/24  0218 07/12/24  1647 07/12/24  1444 07/12/24  1137 05/17/24  1122 05/15/24  0733   WBC 12.9*  --   --  11.6*  --  10.2   HGB 7.2* 7.2* 7.4* 7.7*   < > 7.9*   CR 1.33*  --   --  1.64*  --  1.24*    < > = values in this interval not displayed.        AM labs pending    Assessment/Plan  69 year old female with history of cervical cancer and radiation POD#1 s/p hysterectomy (GYN ONC) c/b cystotomy s/p intra op repair now with SPT and kent in place.  Doing well with low drain outputs.     -  catheter and SPT to stay for now. Patient will need bladder decompression for at least 10-14 days. Will confirm final catheter plan with Dr. Monroy and update.     Seen and examined with the chief resident. Will discuss with Dr. Monroy.    Gordon Corey MD PGY4  Urology Resident     Contacting the Urology Team     Please use the following job codes to reach the Urology Team. Note that you must use an in house phone and that job codes cannot receive text pages.   On weekdays, dial [phone] (or star-star-star [phone] on the new [employer] telephones) then [phone] to reach the Adult Urology resident or PA on call  On weekdays, dial [phone] (or star-star-star [phone] on the new [employer] telephones) then [phone] to reach the Pediatric Urology resident  On weeknights and weekends, dial [phone] (or star-star-star [phone] on the new [employer] telephones) then [phone] to reach the Urology resident on call (for both Adult and Pediatrics)            "

## 2024-07-13 NOTE — PLAN OF CARE
Goal Outcome Evaluation:      Plan of Care Reviewed With: patient    Overall Patient Progress: no changeOverall Patient Progress: no change    Outcome Evaluation: pt denies nausea, but in consistant pain    [id number]  Pt Alert and in consistent pain rated 7-9 that is being controlled medically by scheduled Tylenol and 2 doses Oxycodone. Pain is centralized in abdomen and denies nausea. Suprapubic cath. Dressing changed this morning due to small drainage. LLE & RLE edema and RLE cellulitis with bandage over some blistering sores. Pt on TELE, bradycardia in the 50's but asymptomatic. Team notified. Pt has not ambulated yet, but will plan to this afternoon, baseline is with cane. Minimal kent output, provider is aware.

## 2024-07-13 NOTE — PROGRESS NOTES
Admitted/transferred from: PACU  2 RN full   skin assessment completed by Shauna Fan, SHOAIB and Virginia Barahona.  Skin assessment finding: skin intact, no problems. Except for quarter size bruise on left and right buttocks.   Interventions/actions: Education on pressure ulcer prevention reviewed, pt verbalizes understanding.      Will continue to monitor.

## 2024-07-13 NOTE — OP NOTE
Pre-operative diagnosis:  Serous carcinoma of the uterus  History of cervical cancer   History of pelvic radiation therapy  Bilateral hydroureteronephrosis  Urinary incontinence  Elevated creatinine  Bladder injury   Post-operative diagnosis:  Serous carcinoma of the uterus  History of cervical cancer   History of pelvic radiation therapy  Bilateral hydroureteronephrosis  Urinary incontinence  Elevated creatinine  Bladder injury   Procedure:  Cystorrhaphy, suture of bladder injury - complicated  Omental flap (intraabdominal)  Cystostomy and cystostomy with drainage (open suprapubic tube insertion)   Surgeon:  Black Monroy MD   Assistant(s):  Yeny Wolf MD and Korina Melo MD   Anesthesia:  General endotracheal anesthesia    Estimated blood loss:  50cc  for my portion               Drains:  Angel-Case   Shahid, Suprapubic Tube     Indication:    Alis Hartman is a 69 year old female history of cervical cancer s/p pelvic radiation. She now has serous carcinoma of the uterus. She is undergoing hysterectomy by gynecologic oncology service when a cystotomy was made due to extent of disease in a radiated field. Thus, I was called to repair this injury given my expertise in urologic reconstruction.       Procedure:     Patient was already prepped and draped with an Ye retractor and in lithotomy position.  The uterus and ovaries were previously removed.  The vaginal cuff was closed with PDS.  There was an apparent cystotomy in the dome which wrapped to the patient's left towards the pubic symphysis. We visualized in the interior of the bladder. The right ureteral orifice was effluxing urine. The left was also effluxing urine. However, the cystotomy was close to the left ureteral orifice.   We reviewed her prior imaging which revealed bilateral hydroureteronephrosis. In discussion with the team, she had preoperative incontinence.   We initially attempted to gain some mobility of bladder tissues, but her  prior radiation made this nearly impossible.   However, with a small amount of mobilization, we were able to gain enough mobilization to close the bladder.  Given the challenge of the repair and her preoperative bladder findings, we elected to place a suprapubic tube, which served two purposes - 1) improve bladder drainage and 2) perhaps be a long-term urinary management strategy for her poor bladder/hydronephrosis. We made a stab incision on the uninjured portion of the bladder. Through this, we placed a 16 Fr suprapubic tube. We tunnelled this through the fascia in a suprapubic location. 10cc was placed in the balloon. The entry to the suprapubic tube to the bladder was then closed slightly with vicryl suture.  We then performed bladder closure using interrupted 3-0 PDS sutures. Initial closure was performed, but on kent irrigation, we did reveal an additional portion of the bladder injury beneath the pubic symphysis. Given the patient's radiation, this was extremely challenging to access, but ultimately we were able to close this. We filled the urethral kent catheter, and it came out of the suprapubic tube without significant leak. We filled the suprapubic tube, and the fluid came out of the kent without significant leak.  Given the difficulty of this repair in a radiated field, we elected to also perform omental flap.  We identified the omentum in the upper abdomen.  We freed it from other organs and the abdominal wall adhesions.  We then used ligasure to gain some mobility and form a tongue of omentum that would reach the pelvis.  We then preplaced 4 vicryl sutures in a fan like pattern to protect our entire bladder closure with the omental flap. We also wrapped the omental flap around our suprapubic tube site insertion site on the bladder.  A 15 Fr drain was placed in this location.  We then turned the case back over to Dr. Perez's team for abdomen closure.  As attending surgeon, Black BRUCE MD, was  scrubbed and present for the entire urologic portion of the procedure.

## 2024-07-13 NOTE — PROVIDER NOTIFICATION
Spoke with Gyn/Onc around 1925 regarding low urine output from kent/suprapubic catheter and small amount of leakage at suprapubic catheter site. Also notified team of low urine output via page around [phone]. No new orders at this time, continue to monitor urine and drain output per team.

## 2024-07-13 NOTE — PROGRESS NOTES
Gynecology Oncology Progress Note  07/13/2024      HD# 2/POD# 1 s/p s/p dsc lsc> SNEHA, LSO, omental biopsy, cystotomy repair, suprapubic bladder insertion, omental flap     Disease: Serous uterine carcinoma; hx of cervical cancer s/p chemo/rad '90s; hx VAIN s/p laser      24 hour events:   - to OR  - dsc lsc converted to SNEHA due to intraabdominal adhesive disease  - cystotomy s/p repair with urology including suprapubic and urethral kent placement (both indwelling)  - low temp overnight, resolved--likely due to eating ice chips.  - increased BP, amlodipine restarted with early dose given ~0445    Subjective: Patient is doing well this morning.  Pain is well controlled although patient appears uncomfortable when moving around in bed.  Tolerating PO, passing flatus, Kent in place, has not yet ambulated. Denies chest pain, SOB, nausea/vomiting, fevers/chills, dizziness. Razia hugger in place given low temperatures overnight. Patient again reports she never felt cold or unwell. Feels overheated w razia hugger on and wants it off soon. Requesting ice water as well.    Objective:   Patient Vitals for the past 24 hrs:   BP Temp Temp src Pulse Resp SpO2 Height Weight   07/13/24 0447 -- 97.3  F (36.3  C) Oral -- -- -- -- --   07/13/24 0402 (!) 186/85 (!) 94.8  F (34.9  C) Axillary 62 14 100 % -- --   07/13/24 0355 (!) 183/96 -- -- 68 17 100 % -- --   07/13/24 0300 -- -- -- 59 20 100 % -- --   07/13/24 0259 -- (!) 94.3  F (34.6  C) Axillary -- -- -- -- --   07/13/24 0230 133/85 -- -- 56 15 100 % -- --   07/13/24 0200 (!) 162/87 -- -- 59 13 100 % -- --   07/13/24 0159 -- (!) 93.8  F (34.3  C) Axillary -- -- -- -- --   07/13/24 0142 -- (!) 92.6  F (33.7  C) Axillary -- -- -- -- --   07/13/24 0131 (!) 157/83 -- -- 59 19 100 % -- --   07/13/24 0130 -- -- -- 64 12 100 % -- --   07/13/24 0100 (!) 155/80 -- -- 59 18 100 % -- --   07/13/24 0030 -- -- -- 57 10 100 % -- --   07/13/24 0000 -- -- -- 59 14 100 % -- --   07/12/24 2316 (!)  "147/82 -- -- 60 14 100 % -- --   07/12/24 2300 132/71 -- -- 58 11 100 % -- --   07/12/24 2229 138/82 -- -- 60 15 100 % -- --   07/12/24 2228 (!) 146/79 97.7  F (36.5  C) Oral 63 11 100 % -- --   07/12/24 2143 132/73 97.4  F (36.3  C) Oral 63 11 100 % -- --   07/12/24 2102 -- -- -- -- -- 100 % -- --   07/12/24 2100 109/62 -- -- 58 12 100 % -- --   07/12/24 2045 112/65 -- -- 58 10 100 % -- --   07/12/24 2030 113/67 -- -- 60 12 100 % -- --   07/12/24 2015 129/73 -- -- 62 14 100 % -- --   07/12/24 2000 103/60 98  F (36.7  C) Axillary 61 11 100 % -- --   07/12/24 1945 114/63 -- -- 63 14 100 % -- --   07/12/24 1941 114/63 -- -- 65 11 100 % -- --   07/12/24 1930 133/75 -- -- 63 13 100 % -- --   07/12/24 1915 132/76 -- -- 62 12 100 % -- --   07/12/24 1900 138/78 -- -- 67 19 100 % -- --   07/12/24 1859 138/78 -- -- 67 16 100 % -- --   07/12/24 1845 (!) 149/78 -- -- 65 15 100 % -- --   07/12/24 1842 (!) 149/78 -- -- 63 17 100 % -- --   07/12/24 1833 -- -- -- -- -- 100 % -- --   07/12/24 1830 (!) 147/81 -- -- 65 10 100 % -- --   07/12/24 1828 (!) 147/81 -- -- 66 10 100 % -- --   07/12/24 1823 -- -- -- -- -- 100 % -- --   07/12/24 1817 -- -- -- -- -- 100 % -- --   07/12/24 1815 -- -- -- 66 11 100 % -- --   07/12/24 1807 -- 98.2  F (36.8  C) Axillary 67 13 100 % -- --   07/12/24 1103 123/74 99.2  F (37.3  C) Oral 63 16 -- 1.6 m (5' 3\") 70.5 kg (155 lb 6.8 oz)       General: NAD, appears comfortable in bed  CV: RRR, no m/r/g  Resp: CTAB, no wheezes, shallow breathing suspected d/t splinting  Abdomen: soft, appropriately tender, non distended  Incision:  c/d/i, dressing with unchanged shadowing from prior checks  Extremities: warm, well-perfused, nontender, no edema, SCDs in place    I/Os  (Yesterday // Since Midnight)  PO not documented  IVF 2000cc // 600cc  Urine 1710cc // 215cc    New Labs/Imaging-   Results for orders placed or performed during the hospital encounter of 07/12/24 (from the past 24 hour(s))   Prepare red blood " cells (unit)   Result Value Ref Range    Blood Component Type Red Blood Cells     Product Code D0036O65     Unit Status Ready for issue     Unit Number [id number]     CROSSMATCH Compatible     CODING SYSTEM [id number]    Prepare red blood cells (unit)   Result Value Ref Range    Blood Component Type Red Blood Cells     Product Code A9361T13     Unit Status Ready for issue     Unit Number [id number]     CROSSMATCH Compatible     CODING SYSTEM [id number]    Glucose by meter   Result Value Ref Range    GLUCOSE BY METER POCT 99 70 - 99 mg/dL   CBC with platelets   Result Value Ref Range    WBC Count 11.6 (H) 4.0 - 11.0 10e3/uL    RBC Count 3.59 (L) 3.80 - 5.20 10e6/uL    Hemoglobin 7.7 (L) 11.7 - 15.7 g/dL    Hematocrit 25.4 (L) 35.0 - 47.0 %    MCV 71 (L) 78 - 100 fL    MCH 21.4 (L) 26.5 - 33.0 pg    MCHC 30.3 (L) 31.5 - 36.5 g/dL    RDW 20.4 (H) 10.0 - 15.0 %    Platelet Count 364 150 - 450 10e3/uL   ABO/Rh type and screen    Narrative    The following orders were created for panel order ABO/Rh type and screen.  Procedure                               Abnormality         Status                     ---------                               -----------         ------                     Adult Type and Screen[[id number]]                            Final result                 Please view results for these tests on the individual orders.   Adult Type and Screen   Result Value Ref Range    ABO/RH(D) O POS     Antibody Screen Negative Negative    SPECIMEN EXPIRATION DATE [id number]    Basic metabolic panel   Result Value Ref Range    Sodium 134 (L) 135 - 145 mmol/L    Potassium 3.1 (L) 3.4 - 5.3 mmol/L    Chloride 93 (L) 98 - 107 mmol/L    Carbon Dioxide (CO2) 29 22 - 29 mmol/L    Anion Gap 12 7 - 15 mmol/L    Urea Nitrogen 21.3 8.0 - 23.0 mg/dL    Creatinine 1.64 (H) 0.51 - 0.95 mg/dL    GFR Estimate 33 (L) >60 mL/min/1.73m2    Calcium 8.4 (L) 8.8 - 10.2 mg/dL    Glucose 92 70 - 99 mg/dL   Arterial Panel POCT   Result  Value Ref Range    pH Arterial POCT 7.53 (H) 7.35 - 7.45    pCO2 Arterial POCT 36 35 - 45 mm Hg    pO2 Arterial POCT 215 (H) 80 - 105 mm Hg    Bicarbonate Arterial POCT 31 (H) 21 - 28 mmol/L    Sodium POCT 136 135 - 145 mmol/L    Potassium POCT 2.7 (L) 3.4 - 5.3 mmol/L    Hemoglobin POCT 7.4 (L) 11.7 - 15.7 g/dL    Glucose Whole Blood POCT 122 (H) 70 - 99 mg/dL    Calcium, Ionized Whole Blood POCT 4.4 4.4 - 5.2 mg/dL    Base Excess/Deficit (+/-) POCT 7.2 (H) -3.0 - 3.0 mmol/L    FIO2 POCT 42.0 %    O2 Sat, Arterial POCT 99 (H) 95 - 96 %    Lactic Acid POCT 0.8 0.7 - 2.0 mmol/L    Oxyhemoglobin Arterial POCT 97 92 - 100 %    Narrative    In healthy individuals, oxyhemoglobin (O2Hb) and oxygen saturation (SO2) are approximately equal. In the presence of dyshemoglobins, oxyhemoglobin can be considerably lower than oxygen saturation.   Oxyhemoglobin, arterial POCT   Result Value Ref Range    Oxyhemoglobin Arterial POCT 97 92 - 100 %   Arterial Panel POCT   Result Value Ref Range    pH Arterial POCT 7.55 (H) 7.35 - 7.45    pCO2 Arterial POCT 34 (L) 35 - 45 mm Hg    pO2 Arterial POCT 168 (H) 80 - 105 mm Hg    Bicarbonate Arterial POCT 30 (H) 21 - 28 mmol/L    Sodium POCT 140 135 - 145 mmol/L    Potassium POCT 3.2 (L) 3.4 - 5.3 mmol/L    Hemoglobin POCT 7.2 (L) 11.7 - 15.7 g/dL    Glucose Whole Blood POCT 119 (H) 70 - 99 mg/dL    Calcium, Ionized Whole Blood POCT 4.5 4.4 - 5.2 mg/dL    Base Excess/Deficit (+/-) POCT 6.9 (H) -3.0 - 3.0 mmol/L    FIO2 POCT 33.0 %    O2 Sat, Arterial POCT 99 (H) 95 - 96 %    Lactic Acid POCT 0.8 0.7 - 2.0 mmol/L    Oxyhemoglobin Arterial POCT 96 92 - 100 %    Narrative    In healthy individuals, oxyhemoglobin (O2Hb) and oxygen saturation (SO2) are approximately equal. In the presence of dyshemoglobins, oxyhemoglobin can be considerably lower than oxygen saturation.   Oxyhemoglobin, arterial POCT   Result Value Ref Range    Oxyhemoglobin Arterial POCT 96 92 - 100 %   Glucose by meter   Result  Value Ref Range    GLUCOSE BY METER POCT 160 (H) 70 - 99 mg/dL   Basic metabolic panel   Result Value Ref Range    Sodium 139 135 - 145 mmol/L    Potassium 3.2 (L) 3.4 - 5.3 mmol/L    Chloride 99 98 - 107 mmol/L    Carbon Dioxide (CO2) 28 22 - 29 mmol/L    Anion Gap 12 7 - 15 mmol/L    Urea Nitrogen 18.3 8.0 - 23.0 mg/dL    Creatinine 1.33 (H) 0.51 - 0.95 mg/dL    GFR Estimate 43 (L) >60 mL/min/1.73m2    Calcium 8.2 (L) 8.8 - 10.2 mg/dL    Glucose 140 (H) 70 - 99 mg/dL   CBC with platelets   Result Value Ref Range    WBC Count 12.9 (H) 4.0 - 11.0 10e3/uL    RBC Count 3.39 (L) 3.80 - 5.20 10e6/uL    Hemoglobin 7.2 (L) 11.7 - 15.7 g/dL    Hematocrit 24.8 (L) 35.0 - 47.0 %    MCV 73 (L) 78 - 100 fL    MCH 21.2 (L) 26.5 - 33.0 pg    MCHC 29.0 (L) 31.5 - 36.5 g/dL    RDW 20.5 (H) 10.0 - 15.0 %    Platelet Count 347 150 - 450 10e3/uL   Glucose by meter   Result Value Ref Range    GLUCOSE BY METER POCT 128 (H) 70 - 99 mg/dL       Pending cultures (date obtained):   none    Assessment:     #Postoperative state:  Dz: Serous uterine carcinoma; hx of cervical cancer s/p chemo/rad '90s; hx VAIN s/p laser  FEN: ADAT, mIVF  Pain: Chronic pain - PTA oxy; here 5-10 Q4H, Tylenol   Heme: Chronic anemia, likely due to history of radiation therapy. Hemoglobin appropriate postop, may need transfusion if hemoglobin decreases to <7.   CV: Afib-not started Abixaban, HTN- PTA Amlodipine 10, Losartan [HELD], Atenolol 100 (ord'd)   Pulm: NI  GI: Antemetics, stool softener; s/p gastric bypass   ID: S/p Ancef/Flagyl   Endo: NI  Psych/Neuro/MSK: Schizophrenia- PTA Seroquel, Zoloft, cogentin, ambien (ord'd half); osteoarthritis; LE edema- PTA lasix 40 every day [HELD]   PPX: SCDs, IS  Dispo: TBD  Disposition: pending post operative course     #Bladder injury intraoperative s/p bladder repair  #CKD w/ recent MADHU  #History of urinary retention  #Urinary incontinence  - Chronic kidney disease, baseline creatinine 1.3-1.6.   - kent in place. Plan  for removal of transurethral catheter prior to discharge if drain creatinine normal. If kent catheter naturally falls out due to urethral laxity, do NOT need to replace.   - TINY Drain in place during hospitalization--check drain creatinine prior to discharge and if normal will discontinue prior to discharge.   -Suprapubic catheter: Plan for discharge to home with suprapubic catheter in place.   - Urology consulted in OR & following     #Atrial fibrillation  - Telemetry     #HTN  - Hold PTA losartan  - Continue PTA atenolol  - PTA amlodipine restarted early this morning     #S/p gastric bypass  - PRN antiemetics  - Bowel regimen     #Chronic pain  #OA  - Continue PTA oxy     #LE edema  - Continue PTA lasix     #Paranoid schizophrenia  - Continue PTA seorquel, zoloft, cogentin, vistaril, ambien     #PPx:   - SCDs, intra-op ancef, and preop heparin        Dany Lopez MD  [facility]  Gynecology Oncology Resident, PGY-2  07/13/2024 6:33 AM    To reach the GYNECOLOGY ONCOLOGY team for this patient, please page [phone]      I have seen and examined the patient.  I have reviewed and edited Dr. Lopez's note above.      Lisa Rice MD, MS, FACOG, FACS  7/13/2024  7:36 AM

## 2024-07-13 NOTE — ANESTHESIA POSTPROCEDURE EVALUATION
Patient: Alis Hartman    Procedure: Procedure(s):  Diagnostic laparoscopy converted to exploratory laparotomy, total abdominal hysterectomy, bilateral salpingo-oophorectomy, lysis of adhesions >60 min  Repair bladder  Insertion of supra-pubic catheter       Anesthesia Type:  General    Note:  Disposition: Admission   Postop Pain Control: Uneventful            Sign Out: Well controlled pain   PONV: No   Neuro/Psych: Uneventful            Sign Out: Acceptable/Baseline neuro status   Airway/Respiratory: Uneventful            Sign Out: Acceptable/Baseline resp. status   CV/Hemodynamics: Uneventful            Sign Out: Acceptable CV status; No obvious hypovolemia; No obvious fluid overload   Other NRE: NONE   DID A NON-ROUTINE EVENT OCCUR? No           Last vitals:  Vitals Value Taken Time   /67 07/12/24 2030   Temp 36.7  C (98  F) 07/12/24 2000   Pulse 62 07/12/24 2037   Resp 19 07/12/24 2037   SpO2 100 % 07/12/24 2037   Vitals shown include unfiled device data.    Electronically Signed By: Hemanth Nicole MD  July 12, 2024  8:37 PM

## 2024-07-14 ENCOUNTER — APPOINTMENT (OUTPATIENT)
Dept: ULTRASOUND IMAGING | Facility: CLINIC | Age: 69
DRG: 740 | End: 2024-07-14
Attending: STUDENT IN AN ORGANIZED HEALTH CARE EDUCATION/TRAINING PROGRAM
Payer: COMMERCIAL

## 2024-07-14 ENCOUNTER — APPOINTMENT (OUTPATIENT)
Dept: OCCUPATIONAL THERAPY | Facility: CLINIC | Age: 69
DRG: 740 | End: 2024-07-14
Attending: OBSTETRICS & GYNECOLOGY
Payer: COMMERCIAL

## 2024-07-14 LAB
ALBUMIN SERPL BCG-MCNC: 2 G/DL (ref 3.5–5.2)
ALP SERPL-CCNC: 69 U/L (ref 40–150)
ALT SERPL W P-5'-P-CCNC: <5 U/L (ref 0–50)
ANION GAP SERPL CALCULATED.3IONS-SCNC: 13 MMOL/L (ref 7–15)
AST SERPL W P-5'-P-CCNC: 23 U/L (ref 0–45)
BILIRUB SERPL-MCNC: 0.2 MG/DL
BLD PROD TYP BPU: NORMAL
BLD PROD TYP BPU: NORMAL
BLOOD COMPONENT TYPE: NORMAL
BLOOD COMPONENT TYPE: NORMAL
BUN SERPL-MCNC: 26.7 MG/DL (ref 8–23)
CALCIUM SERPL-MCNC: 8 MG/DL (ref 8.8–10.2)
CHLORIDE SERPL-SCNC: 96 MMOL/L (ref 98–107)
CODING SYSTEM: NORMAL
CODING SYSTEM: NORMAL
CREAT SERPL-MCNC: 2.28 MG/DL (ref 0.51–0.95)
CROSSMATCH: NORMAL
CROSSMATCH: NORMAL
DEPRECATED HCO3 PLAS-SCNC: 22 MMOL/L (ref 22–29)
EGFRCR SERPLBLD CKD-EPI 2021: 22 ML/MIN/1.73M2
ERYTHROCYTE [DISTWIDTH] IN BLOOD BY AUTOMATED COUNT: 20.6 % (ref 10–15)
GLUCOSE BLDC GLUCOMTR-MCNC: 91 MG/DL (ref 70–99)
GLUCOSE SERPL-MCNC: 95 MG/DL (ref 70–99)
HCT VFR BLD AUTO: 22.1 % (ref 35–47)
HGB BLD-MCNC: 6.5 G/DL (ref 11.7–15.7)
MCH RBC QN AUTO: 21.7 PG (ref 26.5–33)
MCHC RBC AUTO-ENTMCNC: 29.4 G/DL (ref 31.5–36.5)
MCV RBC AUTO: 74 FL (ref 78–100)
PLATELET # BLD AUTO: 373 10E3/UL (ref 150–450)
POTASSIUM SERPL-SCNC: 3.9 MMOL/L (ref 3.4–5.3)
PROT SERPL-MCNC: 5.7 G/DL (ref 6.4–8.3)
RBC # BLD AUTO: 3 10E6/UL (ref 3.8–5.2)
SODIUM SERPL-SCNC: 131 MMOL/L (ref 135–145)
UNIT ABO/RH: NORMAL
UNIT ABO/RH: NORMAL
UNIT NUMBER: NORMAL
UNIT NUMBER: NORMAL
UNIT STATUS: NORMAL
UNIT STATUS: NORMAL
UNIT TYPE ISBT: 5100
UNIT TYPE ISBT: 5100
WBC # BLD AUTO: 12.4 10E3/UL (ref 4–11)

## 2024-07-14 PROCEDURE — 99024 POSTOP FOLLOW-UP VISIT: CPT | Performed by: OBSTETRICS & GYNECOLOGY

## 2024-07-14 PROCEDURE — P9016 RBC LEUKOCYTES REDUCED: HCPCS

## 2024-07-14 PROCEDURE — 250N000011 HC RX IP 250 OP 636

## 2024-07-14 PROCEDURE — 999N000248 HC STATISTIC IV INSERT WITH US BY RN

## 2024-07-14 PROCEDURE — 120N000002 HC R&B MED SURG/OB UMMC

## 2024-07-14 PROCEDURE — 80053 COMPREHEN METABOLIC PANEL: CPT

## 2024-07-14 PROCEDURE — 76770 US EXAM ABDO BACK WALL COMP: CPT

## 2024-07-14 PROCEDURE — 97530 THERAPEUTIC ACTIVITIES: CPT | Mod: GO

## 2024-07-14 PROCEDURE — 250N000013 HC RX MED GY IP 250 OP 250 PS 637: Performed by: OBSTETRICS & GYNECOLOGY

## 2024-07-14 PROCEDURE — 258N000003 HC RX IP 258 OP 636

## 2024-07-14 PROCEDURE — 258N000003 HC RX IP 258 OP 636: Performed by: OBSTETRICS & GYNECOLOGY

## 2024-07-14 PROCEDURE — 36415 COLL VENOUS BLD VENIPUNCTURE: CPT

## 2024-07-14 PROCEDURE — 85027 COMPLETE CBC AUTOMATED: CPT

## 2024-07-14 PROCEDURE — 76770 US EXAM ABDO BACK WALL COMP: CPT | Mod: 26 | Performed by: RADIOLOGY

## 2024-07-14 PROCEDURE — 97165 OT EVAL LOW COMPLEX 30 MIN: CPT | Mod: GO

## 2024-07-14 RX ORDER — ENOXAPARIN SODIUM 100 MG/ML
30 INJECTION SUBCUTANEOUS EVERY 24 HOURS
Status: DISCONTINUED | OUTPATIENT
Start: 2024-07-15 | End: 2024-07-15

## 2024-07-14 RX ORDER — FUROSEMIDE 10 MG/ML
20 INJECTION INTRAMUSCULAR; INTRAVENOUS ONCE
Status: COMPLETED | OUTPATIENT
Start: 2024-07-14 | End: 2024-07-14

## 2024-07-14 RX ADMIN — FUROSEMIDE 20 MG: 10 INJECTION, SOLUTION INTRAMUSCULAR; INTRAVENOUS at 12:38

## 2024-07-14 RX ADMIN — SENNOSIDES AND DOCUSATE SODIUM 2 TABLET: 50; 8.6 TABLET ORAL at 20:23

## 2024-07-14 RX ADMIN — OXYCODONE HYDROCHLORIDE 10 MG: 5 TABLET ORAL at 20:23

## 2024-07-14 RX ADMIN — OXYCODONE HYDROCHLORIDE 10 MG: 5 TABLET ORAL at 15:35

## 2024-07-14 RX ADMIN — ATENOLOL 100 MG: 25 TABLET ORAL at 08:54

## 2024-07-14 RX ADMIN — SODIUM CHLORIDE, POTASSIUM CHLORIDE, SODIUM LACTATE AND CALCIUM CHLORIDE: 600; 310; 30; 20 INJECTION, SOLUTION INTRAVENOUS at 19:25

## 2024-07-14 RX ADMIN — BENZTROPINE MESYLATE 1 MG: 1 TABLET ORAL at 08:55

## 2024-07-14 RX ADMIN — ACETAMINOPHEN 650 MG: 325 TABLET, FILM COATED ORAL at 10:46

## 2024-07-14 RX ADMIN — SENNOSIDES AND DOCUSATE SODIUM 1 TABLET: 50; 8.6 TABLET ORAL at 08:54

## 2024-07-14 RX ADMIN — SODIUM CHLORIDE, POTASSIUM CHLORIDE, SODIUM LACTATE AND CALCIUM CHLORIDE 500 ML: 600; 310; 30; 20 INJECTION, SOLUTION INTRAVENOUS at 16:03

## 2024-07-14 RX ADMIN — ENOXAPARIN SODIUM 40 MG: 40 INJECTION SUBCUTANEOUS at 11:38

## 2024-07-14 RX ADMIN — OXYCODONE HYDROCHLORIDE 10 MG: 5 TABLET ORAL at 10:46

## 2024-07-14 RX ADMIN — OXYCODONE HYDROCHLORIDE 10 MG: 5 TABLET ORAL at 06:04

## 2024-07-14 RX ADMIN — ACETAMINOPHEN 650 MG: 325 TABLET, FILM COATED ORAL at 04:41

## 2024-07-14 RX ADMIN — BISACODYL 10 MG: 10 SUPPOSITORY RECTAL at 08:53

## 2024-07-14 RX ADMIN — SODIUM CHLORIDE, POTASSIUM CHLORIDE, SODIUM LACTATE AND CALCIUM CHLORIDE: 600; 310; 30; 20 INJECTION, SOLUTION INTRAVENOUS at 12:38

## 2024-07-14 RX ADMIN — ACETAMINOPHEN 650 MG: 325 TABLET, FILM COATED ORAL at 16:05

## 2024-07-14 ASSESSMENT — ACTIVITIES OF DAILY LIVING (ADL)
ADLS_ACUITY_SCORE: 45
ADLS_ACUITY_SCORE: 44
ADLS_ACUITY_SCORE: 44
ADLS_ACUITY_SCORE: 45
ADLS_ACUITY_SCORE: 45
ADLS_ACUITY_SCORE: 44
ADLS_ACUITY_SCORE: 45
ADLS_ACUITY_SCORE: 44
ADLS_ACUITY_SCORE: 45
ADLS_ACUITY_SCORE: 44
ADLS_ACUITY_SCORE: 45
ADLS_ACUITY_SCORE: 45
ADLS_ACUITY_SCORE: 44
ADLS_ACUITY_SCORE: 45
ADLS_ACUITY_SCORE: 44

## 2024-07-14 NOTE — PLAN OF CARE
Goal Outcome Evaluation:    POD# 1 s/p Ex lap, SNEHA, BSO. Patient is A&O x 4, pain well controlled with Oxycodone and Tylenol, denies nausea. On Regular diet. PIV infusing. TINY in place. Shahid and SP catheter with low urine output, MD notified. See MD note for details. SCDs in place, IS at bedside. Continuous pulse oxymetry. Call light in reach.

## 2024-07-14 NOTE — PROGRESS NOTES
"    Goal Outcome Evaluation: POD#2 s/p Ex lap, SNEHA, BSO.     Patient is A&Ox4, slightly forgetful, but makes her needs known and using call light appropriately. VSS, zenobia ALLAN--50s asymptomatic, MD aware. C/O moderate-severe incisional pain, managed with PRN Oxycodone 10mg Q4hrs and scheduled Tylenol. Patient tolerating Regular diet, but eating small amt and drinking sips of fluids, denies nausea. Received 1unit of RBC today and pt tolerated. L PIV removed as pt c/o pain around the iv sites, new PIV on right arm IVF LR infusing 100ml/hr, received a bolus of 500ml LR. Pt has generalized edema, more on lower body. TINY in place with minimal outputs. Shahid and SP catheter in place--I&Os recorded Q4hrs in the flowsheet. Ambulated in the room x2 with A1/walker/GB, refusing to ambulate out of the room stating \"its too much work.\" Encouraged pt to increase mobility, and to use IS Q1-2hrs while awake. Labs and urine sample need to be collected at 4am per provider. Will continue to monitor and assess.   "

## 2024-07-14 NOTE — PLAN OF CARE
[id number]. Bradycardic in 50's, otherwise VSS on RA. Pain tolerable with scheduled Tylenol/PRN Oxycodone. Up to chair with 2 assist and gait belt/walker. Encouraged pt to try to ambulate more tonight. PT/OT orders placed by team. Low urine output from kent and suprapubic catheter. TINY drain with small amount of bloody drainage out. Regular diet, not eating much because she doesn't like the food here. IVF infusing into PIV @ 100 mL/hr. Continue to monitor and with POC.

## 2024-07-14 NOTE — PROGRESS NOTES
"Urology  Progress Note    NAEO  Pain well controlled  Kent in place, SPT in place     Exam  /56 (BP Location: Right arm)   Pulse 53   Temp 97.5  F (36.4  C) (Oral)   Resp 16   Ht 1.6 m (5' 3\")   Wt 74 kg (163 lb 2.3 oz)   SpO2 100%   BMI 28.90 kg/m    No acute distress  Unlabored breathing  Abdomen soft,  appropriately tender, nondistended. SPT draining light pink urine  TNIY serosanguinous  Kent with light pink urine in tubing    Irrigated urethral kent with drainage into SPT and easily able to draw back fluid     /85  /75  Catheter: 315/10  TINY 45/0    Labs  Recent Labs   Lab Test 07/14/24  0609 07/13/24  0218 07/12/24  1647 07/12/24  1444 07/12/24  1137 05/17/24  1122 05/15/24  0733   WBC 12.4* 12.9*  --   --  11.6*  --  10.2   HGB 6.5* 7.2* 7.2*   < > 7.7*   < > 7.9*   CR  --  1.33*  --   --  1.64*  --  1.24*    < > = values in this interval not displayed.       AM labs pending    Assessment/Plan  69 year old female with history of cervical cancer and radiation POD#2 s/p hysterectomy (GYN ONC) c/b cystotomy s/p intra op repair now with SPT and kent in place. Low drain outputs, but also low UOP. Catheters easy to flush. Patient on lasix at home but not receiving here.     -  catheter and SPT to stay while admitted. Patient will need bladder decompression for at least 10-14 days.   - agree with ASAF; will follow (noted to have hydro pre procedurally however)  - could consider restarting her PTA lasix as her oliguira may be related to this       Addendum:   - reviewed RBUS. No hydro and bladder decompressed. MADHU and low UOP likely pre renal. Would recommend fluid resuscitation as well as giving lasix as she is on this at baseline     Seen and examined with the chief resident. Will discuss with Dr. Monroy.    Gordon Corey MD PGY4  Urology Resident     Contacting the Urology Team     Please use the following job codes to reach the Urology Team. Note that you must use an in house phone and " that job codes cannot receive text pages.   On weekdays, dial [phone] (or star-star-star [phone] on the new [employer] telephones) then [phone] to reach the Adult Urology resident or PA on call  On weekdays, dial [phone] (or star-star-star [phone] on the new [employer] telephones) then [phone] to reach the Pediatric Urology resident  On weeknights and weekends, dial [phone] (or star-star-star [phone] on the new [employer] telephones) then [phone] to reach the Urology resident on call (for both Adult and Pediatrics)

## 2024-07-14 NOTE — PROGRESS NOTES
Gynecology Oncology Progress Note  07/14/2024      HD#3/POD#2 s/p s/p dsc lsc> SNEHA, LSO, omental biopsy, cystotomy repair, suprapubic bladder insertion, omental flap     Disease: Serous uterine carcinoma; hx of cervical cancer s/p chemo/rad '90s; hx VAIN s/p laser      24 hour events:   - increased BP, amlodipine restarted and titrated  - Lovenox started     Subjective: Patient is doing well this morning.  Pain is well moderately controlled although patient appears uncomfortable when moving around in bed.  Tolerating PO. Not sure if she is passing flatus, Shahid in place, was able to ambulate to chair yesterday and sat there for a while. Denies chest pain, SOB, nausea/vomiting, fevers/chills, dizziness.    Objective:   Patient Vitals for the past 24 hrs:   BP Temp Temp src Pulse Resp SpO2 Weight   07/14/24 0815 96/53 97.5  F (36.4  C) Oral 53 16 100 % --   07/14/24 0037 104/56 97.5  F (36.4  C) Oral 53 16 100 % --   07/13/24 2021 99/60 97.3  F (36.3  C) Oral 57 16 100 % --   07/13/24 1537 102/58 97.5  F (36.4  C) Oral 58 20 100 % --   07/13/24 1241 94/64 97.5  F (36.4  C) Oral 63 20 100 % --   07/13/24 1048 -- -- -- 55 -- -- --   07/13/24 0837 117/65 97.4  F (36.3  C) Oral 63 18 100 % 74 kg (163 lb 2.3 oz)       General: NAD, appears comfortable in bed   CV: RRR, no m/r/g  Resp: CTAB, no wheezes, shallow breathing suspected d/t splinting  Abdomen: soft, appropriately tender, non distended  Incision:  c/d/i, dressing without shadowing  Extremities: warm, well-perfused, nontender, no edema, SCDs in place  Lines: urethral catheter with scant yellow  urine, suprapubic catheter with serosanguinous stained urine, left justin drain with scant serosanguinous fluid    I/Os  (Yesterday // Since Midnight)  PO 625cc //   IVF 1800cc // 1200cc  Urine 470cc // 85cc  Drain 45cc // 0 cc    New Labs/Imaging-   Results for orders placed or performed during the hospital encounter of 07/12/24 (from the past 24 hour(s))   Glucose by meter    Result Value Ref Range    GLUCOSE BY METER POCT 91 70 - 99 mg/dL   CBC with platelets   Result Value Ref Range    WBC Count 12.4 (H) 4.0 - 11.0 10e3/uL    RBC Count 3.00 (L) 3.80 - 5.20 10e6/uL    Hemoglobin 6.5 (LL) 11.7 - 15.7 g/dL    Hematocrit 22.1 (L) 35.0 - 47.0 %    MCV 74 (L) 78 - 100 fL    MCH 21.7 (L) 26.5 - 33.0 pg    MCHC 29.4 (L) 31.5 - 36.5 g/dL    RDW 20.6 (H) 10.0 - 15.0 %    Platelet Count 373 150 - 450 10e3/uL   Comprehensive metabolic panel   Result Value Ref Range    Sodium 131 (L) 135 - 145 mmol/L    Potassium 3.9 3.4 - 5.3 mmol/L    Carbon Dioxide (CO2) 22 22 - 29 mmol/L    Anion Gap 13 7 - 15 mmol/L    Urea Nitrogen 26.7 (H) 8.0 - 23.0 mg/dL    Creatinine 2.28 (H) 0.51 - 0.95 mg/dL    GFR Estimate 22 (L) >60 mL/min/1.73m2    Calcium 8.0 (L) 8.8 - 10.2 mg/dL    Chloride 96 (L) 98 - 107 mmol/L    Glucose 95 70 - 99 mg/dL    Alkaline Phosphatase 69 40 - 150 U/L    AST 23 0 - 45 U/L    ALT <5 0 - 50 U/L    Protein Total 5.7 (L) 6.4 - 8.3 g/dL    Albumin 2.0 (L) 3.5 - 5.2 g/dL    Bilirubin Total 0.2 <=1.2 mg/dL     Assessment: Alis Hartman is a 69 year old who is HD#3/POD#2 s/p s/p dsc lsc> SNEHA, LSO, omental biopsy, cystotomy repair, suprapubic bladder insertion, omental flap        #Bladder injury intraoperative s/p bladder repair  #CKD w/ recent MADHU  #History of urinary retention  #Urinary incontinence  #low uop   - Chronic kidney disease, baseline creatinine 1.3-1.6.  - MADHU with Cr 1.64>1.33>2.28. Most likely secondary to post-operative state in the setting of chronic lasix use. Will get renal ultrasound to evaluate for hydronephrosis in the setting of extensive bladder repair.   - Will plan to restart lasix.   - kent in place. Plan for removal of transurethral catheter prior to discharge if drain creatinine normal. If kent catheter naturally falls out due to urethral laxity, do NOT need to replace.   - TINY Drain in place during hospitalization--check drain creatinine prior to  discharge and if normal will discontinue prior to discharge.   -Suprapubic catheter: Plan for discharge to home with suprapubic catheter in place.   - Urology consulted in OR & following     Acute on chronic anemia   Transfuse 1 unit prbc's due to hemoglobin <7.  Likely due to equilibration from surgery.   -Patient had low hgb prior to surgery and currently there is low concern for intraabdominal bleeding,     #Atrial fibrillation  - s/p Telemetry  -Transition from Lovenox.      #HTN  - Hold PTA losartan  - PTA atenolol 100mg qd  - PTA amlodipine 10mg  qd     #S/p gastric bypass  - PRN antiemetics  - Bowel regimen     #LE edema  -PTA lasix currently held, will restart prn pending Cr and uop.      #Paranoid schizophrenia  - Continue PTA seorquel, zoloft, cogentin, vistaril, ambien     #Postoperative state:  Dz: Serous uterine carcinoma; hx of cervical cancer s/p chemo/rad '90s; hx VAIN s/p laser  FEN: ADAT, mIVF   Pain: Chronic pain - PTA oxy; here 5-10 Q4H, Tylenol   CV: Afib-not started Abixaban, HTN- PTA Amlodipine 10, Losartan [HELD], Atenolol 100 (ord'd)   Pulm: NI  GI: Antemetics, stool softener; s/p gastric bypass   ID: No issues.  Endo: NI  Psych/Neuro/MSK: Schizophrenia- PTA Seroquel, Zoloft, cogentin, ambien (ord'd half); osteoarthritis; LE edema- PTA lasix 40 every day [HELD]   PPX: SCDs, IS, lovenox.  Dispo: TBD  Disposition: pending post operative course    Kristy Chris MD MPH  Obstetric & Gynecology, PGY-2  07/14/2024 8:27 AM    To reach the GYNECOLOGY ONCOLOGY team for this patient, please page [phone]      I have seen and examined the patient.  I have reviewed and edited Dr. Chris's note above.  I have personally reviewed the labs.   Aranza looks well despite decreased  BP over the past 24h, and decreased UOP. Question whether decreased BP could be false readings due to patient preference for wrist BP checks vs upper arm.   Plan for renal ultrasound today to rule-out ureteral obstruction as cause  of increased creatinine given extensive bladder reconstruction.   Transfuse 1 unit of prbc's due to chronic radiation-induced anemia with appropriate post-op hemoglobin drop resulting in hgb <7. No concern for ongoing bleeding based on clinical exam. Prbcs may also increase UOP.   Continue to monitor clinical exam, labs.       Lisa Rice MD, MS, FACOG, FACS  7/14/2024  10:15 AM

## 2024-07-14 NOTE — PROGRESS NOTES
07/14/24 1212   Appointment Info   Signing Clinician's Name / Credentials (OT) Mary Carmen Talley OTR/L   Rehab Comments (OT) abdominal prec   Living Environment   People in Home grandchild(vernell);child(vernell), adult   Current Living Arrangements house   Home Accessibility no concerns   Transportation Anticipated family or friend will provide   Living Environment Comments Pt reports living in house with many adult family members who assist her. Has shower chair,   Self-Care   Usual Activity Tolerance moderate   Current Activity Tolerance fair   Regular Exercise No   Equipment Currently Used at Home cane, straight;walker, standard;shower chair   Fall history within last six months no   Activity/Exercise/Self-Care Comment Pt reports receives assist for  bathing, occasionally receives assist for LB dressing. Reports family can assist at discharge.   Instrumental Activities of Daily Living (IADL)   IADL Comments Pt reports family completes IADLs including meal prep and cleaning.   General Information   Onset of Illness/Injury or Date of Surgery 07/12/24   Referring Physician Lelo Shah MD   Patient/Family Therapy Goal Statement (OT) to go home   Additional Occupational Profile Info/Pertinent History of Current Problem Per chart: 69 year old female with history of cervical cancer and radiation POD#2 s/p hysterectomy (GYN ONC) c/b cystotomy s/p intra op repair now with SPT and kent in place.   Existing Precautions/Restrictions abdominal;fall   Left Upper Extremity (Weight-bearing Status) other (see comments)  (<10#s)   Right Upper Extremity (Weight-bearing Status) other (see comments)  (<10#s)   Left Lower Extremity (Weight-bearing Status) full weight-bearing (FWB)   Right Lower Extremity (Weight-bearing Status) full weight-bearing (FWB)   General Observations and Info Activity Ambulate with assist   Cognitive Status Examination   Orientation Status orientation to person, place and time   Affect/Mental Status  (Cognitive) low arousal/lethargic   Cognitive Status Comments Pt grossly cognitively intact. Will continue to monitor.   Visual Perception   Visual Impairment/Limitations WFL   Sensory   Sensory Quick Adds sensation intact   Pain Assessment   Patient Currently in Pain Yes, see Vital Sign flowsheet  (in abdomen)   Posture   Posture not impaired   Range of Motion Comprehensive   General Range of Motion no range of motion deficits identified   Strength Comprehensive (MMT)   Comment, General Manual Muscle Testing (MMT) Assessment Not formally assessed 2/2 precautions, observed WFL for ADLs   Muscle Tone Assessment   Muscle Tone Quick Adds No deficits were identified   Coordination   Upper Extremity Coordination No deficits were identified   Bed Mobility   Comment (Bed Mobility) MinAx1   Transfers   Transfer Comments CGAx1 with FWW   Balance   Balance Comments CGA with FWW   Activities of Daily Living   BADL Assessment/Intervention bathing;upper body dressing;lower body dressing;grooming;toileting   Bathing Assessment/Intervention   [place] Level (Bathing) moderate assist (50% patient effort)   Comment, (Bathing) Per clinical reasoning   Upper Body Dressing Assessment/Training   Comment, (Upper Body Dressing) Per clinical reasoning   [place] Level (Upper Body Dressing) set up   Lower Body Dressing Assessment/Training   Comment, (Lower Body Dressing) doffing socks   [place] Level (Lower Body Dressing) dependent (less than 25% patient effort)   Grooming Assessment/Training   [place] Level (Grooming) set up   Comment, (Grooming) Per clinical reasoning   Toileting   Comment, (Toileting) Per clinical reasoning   [place] Level (Toileting) moderate assist (50% patient effort)   Clinical Impression   Criteria for Skilled Therapeutic Interventions Met (OT) Yes, treatment indicated   OT Diagnosis Decreased safety/engagment in ADLs/IADLs now with post surgical precautions   OT Problem List-Impairments  impacting ADL problems related to;activity tolerance impaired;post-surgical precautions;pain;mobility   Assessment of Occupational Performance 3-5 Performance Deficits   Identified Performance Deficits dressing, bathing, toileting, functional mobility   Planned Therapy Interventions (OT) ADL retraining;IADL retraining;home program guidelines;progressive activity/exercise;risk factor education;transfer training;strengthening   Clinical Decision Making Complexity (OT) problem focused assessment/low complexity   Risk & Benefits of therapy have been explained evaluation/treatment results reviewed;care plan/treatment goals reviewed;risks/benefits reviewed;current/potential barriers reviewed;participants voiced agreement with care plan;participants included;patient   Clinical Impression Comments Pt will benefit from continued skilled OT services to progress IND and safety with ADLs/IADLs.   OT Total Evaluation Time   OT Eval, Low Complexity Minutes (99686) 10   OT Goals   Therapy Frequency (OT) 6 times/week   OT Predicted Duration/Target Date for Goal Attainment 07/21/24   OT Goals Hygiene/Grooming;Upper Body Dressing;Lower Body Dressing;Upper Body Bathing;Lower Body Bathing;Toilet Transfer/Toileting;OT Goal 1;Transfers   OT: Hygiene/Grooming modified independent   OT: Upper Body Dressing Modified independent   OT: Lower Body Dressing Minimal assist;using adaptive equipment   OT: Upper Body Bathing Modified independent   OT: Lower Body Bathing Minimal assist;using adaptive equipment   OT: Transfer Modified independent  (shower tx)   OT: Toilet Transfer/Toileting Modified independent   OT: Goal 1 Pt will verbalize 100% of precautions to progress safety with ADLs/IADLs.   Interventions   Interventions Quick Adds Therapeutic Activity   Therapeutic Activities   Therapeutic Activity Minutes (46367) 24   Symptoms noted during/after treatment fatigue;increased pain   Treatment Detail/Skilled Intervention Pt greeted in bed.[facility] pt  "on post surgical abd prec including use of \"BLT\" acronym to increase carryover. Observed HR to be 47-49 BPM, RN notified and ok'd activity. Increased time for line mgmt. Edu on use of log roll for bed mobility. With encouragement and education on importance of OOB activity, agreeable to repositioning via STS at EOB. Pt progressing to completing supine>sit MinAx1 with use of bed rail and HOB slightly elevated, cues for log roll throughout. STS with CGA and FWW, progresses to complete ~4 steps with SBA before returning to sitting. Returned to supine with Priti, left with call light in reach, all current needs met.   OT Discharge Planning   OT Plan toilet transfers in bathroom, standing at sink ADLs, LB dressing with AE   OT Discharge Recommendation (DC Rec) home with assist   OT Rationale for DC Rec Pt below baseline but has good support at home. Rec d/c to home with assist from family once medically cleared.   OT Brief overview of current status CGA/SBA with FWW   Total Session Time   Timed Code Treatment Minutes 24   Total Session Time (sum of timed and untimed services) 34     "

## 2024-07-14 NOTE — PROGRESS NOTES
Brief Interval Progress Note:    Checked  in on patient     S: Patient is doing okay, tired and sleepy. She states her pain is okay with the current regime. She had some tacos today without nausea or vomiting. She was able to walk in her room without feeling dizzy and she is passing flatus. Denies chest pain, sob, or palpitations.     O:  Patient Vitals for the past 24 hrs:   BP Temp Temp src Pulse Resp SpO2 Weight   07/13/24 2021 99/60 97.3  F (36.3  C) Oral 57 16 100 % --   07/13/24 1537 102/58 97.5  F (36.4  C) Oral 58 20 100 % --   07/13/24 1241 94/64 97.5  F (36.4  C) Oral 63 20 100 % --   07/13/24 1048 -- -- -- 55 -- -- --   07/13/24 0837 117/65 97.4  F (36.3  C) Oral 63 18 100 % 74 kg (163 lb 2.3 oz)   07/13/24 0745 114/65 97.5  F (36.4  C) Oral 65 17 100 % --   07/13/24 0447 -- 97.3  F (36.3  C) Oral -- -- -- --   07/13/24 0402 (!) 186/85 (!) 94.8  F (34.9  C) Axillary 62 14 100 % --   07/13/24 0355 (!) 183/96 -- -- 68 17 100 % --   07/13/24 0300 -- -- -- 59 20 100 % --   07/13/24 0259 -- (!) 94.3  F (34.6  C) Axillary -- -- -- --   07/13/24 0230 133/85 -- -- 56 15 100 % --   07/13/24 0200 (!) 162/87 -- -- 59 13 100 % --   07/13/24 0159 -- (!) 93.8  F (34.3  C) Axillary -- -- -- --   07/13/24 0142 -- (!) 92.6  F (33.7  C) Axillary -- -- -- --   07/13/24 0131 (!) 157/83 -- -- 59 19 100 % --   07/13/24 0130 -- -- -- 64 12 100 % --   07/13/24 0100 (!) 155/80 -- -- 59 18 100 % --   07/13/24 0030 -- -- -- 57 10 100 % --       Gen: Laying in bed sleeping.   Abd:  Soft, tender to mild palpation, non-distended. Drain on RLQ, super pubic catheter in place with minimum serosanguinous fluid on overlying dressing.   : kent in place.     Suprapubic catheter: 25cc (in 5hrs)  Urethral catheter: 25cc (in 5hrs)   TINY drain: 10cc (in 5 hours)     Cr: 1.64>1.33    A/P:  Alis Hartman is a 69 year old POD#1 s/p dx lap conversion to open, SNEHA, left salpingo-oophorectomy, omentum biopsy, omental flap, open bladder  repair, suprapubic catheter insertion. Who is now experience low uop post operatively     CKD  MADHU  Low uop  -Patient with history of chronic lasix and HCTZ use and CKD (b/l 1.2) with post operative MADHU that is now down trending, is found to have low uop despite maintenance fluid. Vitals currently notable for normal heart rate (although patient is on a beta blocker atenolol for htn), and softer blood pressure from patient's baseline hypertensive state. Low uop most likely secondary to chronic lasix and hydrochlorothiazide dependency rather than an intraabdominal leak which will result in a more significant matilde drain output. Less likely prerenal given bun/cr ratio (18.3/1.33) is less than 20.   -Will continue to monitor, if continue low uop, will plan to restart lasix and hydrochlorothiazide as needed pending blood pressures.   -Discussed with patient if no further improved of uop with lasix or she is having worsening intraabdominal pain, a CT may be needed for further assessment     Afib  -Tele discontinued, received a total of 24 hours of tele monitoring. Denies symptoms  - currently on lovenox with plan to transition to apixiban on POD#2    Kristy Chris MD MPH  Obstetric & Gynecology, PGY-2  July 13, 2024 , 11:51 PM

## 2024-07-15 LAB
ANION GAP SERPL CALCULATED.3IONS-SCNC: 11 MMOL/L (ref 7–15)
BUN SERPL-MCNC: 29.7 MG/DL (ref 8–23)
CALCIUM SERPL-MCNC: 8.2 MG/DL (ref 8.8–10.2)
CHLORIDE SERPL-SCNC: 97 MMOL/L (ref 98–107)
CREAT FLD-MCNC: 4.8 MG/DL
CREAT SERPL-MCNC: 2.14 MG/DL (ref 0.51–0.95)
CREAT UR-MCNC: 69.5 MG/DL
CREATININE BODY FLUID SOURCE: NORMAL
DEPRECATED HCO3 PLAS-SCNC: 25 MMOL/L (ref 22–29)
EGFRCR SERPLBLD CKD-EPI 2021: 24 ML/MIN/1.73M2
ERYTHROCYTE [DISTWIDTH] IN BLOOD BY AUTOMATED COUNT: 22.1 % (ref 10–15)
GLUCOSE SERPL-MCNC: 82 MG/DL (ref 70–99)
HCT VFR BLD AUTO: 28.7 % (ref 35–47)
HGB BLD-MCNC: 8.9 G/DL (ref 11.7–15.7)
MCH RBC QN AUTO: 23.2 PG (ref 26.5–33)
MCHC RBC AUTO-ENTMCNC: 31 G/DL (ref 31.5–36.5)
MCV RBC AUTO: 75 FL (ref 78–100)
PLATELET # BLD AUTO: 385 10E3/UL (ref 150–450)
POTASSIUM SERPL-SCNC: 3.7 MMOL/L (ref 3.4–5.3)
RBC # BLD AUTO: 3.83 10E6/UL (ref 3.8–5.2)
SODIUM SERPL-SCNC: 133 MMOL/L (ref 135–145)
UUN UR-MCNC: 309 MG/DL (ref 801–1666)
WBC # BLD AUTO: 11.2 10E3/UL (ref 4–11)

## 2024-07-15 PROCEDURE — 82570 ASSAY OF URINE CREATININE: CPT | Performed by: STUDENT IN AN ORGANIZED HEALTH CARE EDUCATION/TRAINING PROGRAM

## 2024-07-15 PROCEDURE — 82570 ASSAY OF URINE CREATININE: CPT

## 2024-07-15 PROCEDURE — 80048 BASIC METABOLIC PNL TOTAL CA: CPT | Performed by: OBSTETRICS & GYNECOLOGY

## 2024-07-15 PROCEDURE — 250N000013 HC RX MED GY IP 250 OP 250 PS 637

## 2024-07-15 PROCEDURE — 36415 COLL VENOUS BLD VENIPUNCTURE: CPT | Performed by: OBSTETRICS & GYNECOLOGY

## 2024-07-15 PROCEDURE — 85014 HEMATOCRIT: CPT | Performed by: OBSTETRICS & GYNECOLOGY

## 2024-07-15 PROCEDURE — 258N000003 HC RX IP 258 OP 636: Performed by: OBSTETRICS & GYNECOLOGY

## 2024-07-15 PROCEDURE — 84540 ASSAY OF URINE/UREA-N: CPT | Performed by: OBSTETRICS & GYNECOLOGY

## 2024-07-15 PROCEDURE — 250N000013 HC RX MED GY IP 250 OP 250 PS 637: Performed by: OBSTETRICS & GYNECOLOGY

## 2024-07-15 PROCEDURE — 120N000002 HC R&B MED SURG/OB UMMC

## 2024-07-15 RX ORDER — FUROSEMIDE 20 MG
20 TABLET ORAL DAILY
Status: DISCONTINUED | OUTPATIENT
Start: 2024-07-15 | End: 2024-07-17 | Stop reason: HOSPADM

## 2024-07-15 RX ADMIN — SERTRALINE HYDROCHLORIDE 25 MG: 25 TABLET ORAL at 09:04

## 2024-07-15 RX ADMIN — OXYCODONE HYDROCHLORIDE 10 MG: 5 TABLET ORAL at 01:07

## 2024-07-15 RX ADMIN — APIXABAN 2.5 MG: 2.5 TABLET, FILM COATED ORAL at 09:03

## 2024-07-15 RX ADMIN — APIXABAN 2.5 MG: 2.5 TABLET, FILM COATED ORAL at 20:21

## 2024-07-15 RX ADMIN — ACETAMINOPHEN 650 MG: 325 TABLET, FILM COATED ORAL at 09:02

## 2024-07-15 RX ADMIN — SENNOSIDES AND DOCUSATE SODIUM 2 TABLET: 50; 8.6 TABLET ORAL at 20:22

## 2024-07-15 RX ADMIN — OXYCODONE HYDROCHLORIDE 10 MG: 5 TABLET ORAL at 06:01

## 2024-07-15 RX ADMIN — ACETAMINOPHEN 650 MG: 325 TABLET, FILM COATED ORAL at 20:21

## 2024-07-15 RX ADMIN — SODIUM CHLORIDE, POTASSIUM CHLORIDE, SODIUM LACTATE AND CALCIUM CHLORIDE: 600; 310; 30; 20 INJECTION, SOLUTION INTRAVENOUS at 05:05

## 2024-07-15 RX ADMIN — OXYCODONE HYDROCHLORIDE 5 MG: 5 TABLET ORAL at 22:56

## 2024-07-15 RX ADMIN — FUROSEMIDE 20 MG: 20 TABLET ORAL at 09:04

## 2024-07-15 RX ADMIN — OXYCODONE HYDROCHLORIDE 10 MG: 5 TABLET ORAL at 12:05

## 2024-07-15 RX ADMIN — ACETAMINOPHEN 650 MG: 325 TABLET, FILM COATED ORAL at 01:07

## 2024-07-15 RX ADMIN — OXYCODONE HYDROCHLORIDE 5 MG: 5 TABLET ORAL at 17:07

## 2024-07-15 RX ADMIN — ATENOLOL 100 MG: 25 TABLET ORAL at 09:04

## 2024-07-15 ASSESSMENT — ACTIVITIES OF DAILY LIVING (ADL)
ADLS_ACUITY_SCORE: 49
ADLS_ACUITY_SCORE: 45
ADLS_ACUITY_SCORE: 44
ADLS_ACUITY_SCORE: 44
ADLS_ACUITY_SCORE: 45
ADLS_ACUITY_SCORE: 44
ADLS_ACUITY_SCORE: 44
ADLS_ACUITY_SCORE: 45
DEPENDENT_IADLS:: CLEANING;COOKING;LAUNDRY;TRANSPORTATION;MEAL PREPARATION
ADLS_ACUITY_SCORE: 44
ADLS_ACUITY_SCORE: 45
ADLS_ACUITY_SCORE: 44
ADLS_ACUITY_SCORE: 45
ADLS_ACUITY_SCORE: 44
ADLS_ACUITY_SCORE: 45
ADLS_ACUITY_SCORE: 44
ADLS_ACUITY_SCORE: 45

## 2024-07-15 NOTE — PROGRESS NOTES
"Urology  Progress Note    NAEO  Complains of abdominal and suprapubic discomfort; catheter was unsecured  Tolerating regular diet  Kent in place, SPT in place     Exam  /80 (BP Location: Left arm)   Pulse 58   Temp 97.5  F (36.4  C) (Oral)   Resp 16   Ht 1.6 m (5' 3\")   Wt 77.1 kg (169 lb 15.6 oz)   SpO2 98%   BMI 30.11 kg/m    No acute distress  Unlabored breathing  Abdomen soft,  appropriately tender, nondistended. SPT draining clear yellow urine, incisions CDI  TINY serosanguinous  Kent with clear yellow    UOP 1175/375  /250  Catheter: 375/125  TINY 45/0    Labs  Recent Labs   Lab Test 07/14/24  0609 07/13/24  0218 07/12/24  1647 07/12/24  1444 07/12/24  1137   WBC 12.4* 12.9*  --   --  11.6*   HGB 6.5* 7.2* 7.2*   < > 7.7*   CR 2.28* 1.33*  --   --  1.64*    < > = values in this interval not displayed.       AM labs pending    Assessment/Plan  69 year old female with history of cervical cancer and radiation POD#3 s/p hysterectomy (GYN ONC) c/b cystotomy s/p intra op repair now with SPT and kent in place. Low drain outputs, but also low UOP. Catheters easy to flush. Patient on lasix at home but not receiving here. RBUS without hydro and bladder decompressed    Plan:  - Recommend nephrology consultation for MADHU and low UOP.  -  Catheter and SPT to stay while admitted and remain to drainage on discharge, until first follow-up  - Given radiation history and small high-pressure bladder with incontinence preop, plan for suprapubic tube to reman in place for bladder drainage long-term, per patient preference  - TINY Cr ordered today; if serum, will remove prior to discharge  - Plan for follow-up in 2-3 weeks    Seen and examined with the chief resident. Will discuss with Dr. Monroy.    Gi Del Cid MD, MPH  Urology Resident, PGY-2     Contacting the Urology Team     Please use the following job codes to reach the Urology Team. Note that you must use an in house phone and that job codes cannot " receive text pages.   On weekdays, dial [phone] (or star-star-star [phone] on the new [place] telephones) then [phone] to reach the Adult Urology resident or PA on call  On weekdays, dial [phone] (or star-star-star [phone] on the new [place] telephones) then [phone] to reach the Pediatric Urology resident  On weeknights and weekends, dial [phone] (or star-star-star [phone] on the new [place] telephones) then [phone] to reach the Urology resident on call (for both Adult and Pediatrics)

## 2024-07-15 NOTE — PROGRESS NOTES
"Brief Progress Note    S: In to see patient. She reports feeling well after pain medications. Denies acute concerns.    O:  BP 98/58 (BP Location: Right arm)   Pulse 53   Temp 97.6  F (36.4  C) (Oral)   Resp 16   Ht 1.6 m (5' 3\")   Wt 77.1 kg (169 lb 15.6 oz)   SpO2 100%   BMI 30.11 kg/m      Exam  Gen: NAD  Extremities: chronic edema, +1 to mid calves, ulcer on right calf covered with mepilex    A/P: Alis Hartman is a 69 year old POD#3 s/p dsc lsc> SNEHA, LSO, omental biopsy, cystotomy repair, suprapubic catheter insertion, omental flap after bladder injury. Doing well, UOP improved to 0.9 ml/kg/hr.    - AM FeUrea  - Monitor UOP overnight  - Transfusing 1u pRBCs, f/up post-transfusion Hgb    Coral Plaza MD  Ob/Gyn Resident, PGY-4  07/14/2024 10:13 PM  "

## 2024-07-15 NOTE — PROGRESS NOTES
Gynecology Oncology Progress Note  07/16/24       POD#4 s/p s/p dsc lsc> SNEHA, LSO, omental biopsy, cystotomy repair, suprapubic bladder insertion, omental flap     Disease: Serous uterine carcinoma; hx of cervical cancer s/p chemo/rad '90s; hx VAIN s/p laser    24 hour events:   - Hgb 6.5 > 1u pRBC > 8.9  - lasix x1 & 500 bolus  - renal US: Normal renal ultrasound. No hydronephrosis.     Subjective: Patient is doing okay this AM. Reports her catheters were pulling all night and is feeling uncomfortable. Pain is currently moderately well controlled. Tolerating PO. Passing flatus, and having BM. Shahid in place. Ambulating without dizziness. Denies chest pain, SOB, nausea/vomiting, fevers/chills, dizziness.    Objective:   Patient Vitals for the past 24 hrs:   BP Temp Temp src Pulse Resp SpO2 Weight   07/16/24 0019 110/63 97.9  F (36.6  C) Oral 54 16 100 % --   07/15/24 1601 104/58 98  F (36.7  C) Oral 56 16 100 % --   07/15/24 0848 106/67 97.5  F (36.4  C) Oral 56 16 100 % 81.3 kg (179 lb 3.7 oz)       General: NAD, appears uncomfortable in bed   CV: RRR, no m/r/g  Resp: CTAB, no wheezes, shallow breathing suspected d/t splinting  Abdomen: soft, appropriately tender, non distended  Incision:  c/d/i, dressing without shadowing  Extremities: warm, well-perfused, nontender, chronic edema, 1+, SCDs not in place  Lines: urethral catheter with scant yellow  urine, suprapubic catheter site saturated with serosanguinous fluid and line with serosanguinous stained urine, left justin drain site stained with serosanguinous drainage and drain with scant bloody fluid    I/Os  (Yesterday // Since Midnight)   mL // NR    mL // NR  Urine 1150 mL // 450 mL  Drain 45 mL // 30 mL  Stool x0    New Labs/Imaging-   Results for orders placed or performed during the hospital encounter of 07/12/24 (from the past 24 hour(s))   Creatinine fluid   Result Value Ref Range    Creatinine Fluid Source Drain     Creatinine fluid 4.8 mg/dL     Narrative    No reference ranges have been established. This result should be interpreted in the context of the patient's clinical condition and compared to simultaneous measurement in the patient's blood. This is a lab developed test. It has not been cleared or approved by the FDA. FDA clearance is not required for clinical use.     Assessment: Alis Hartman is a 69 year old who is POD#4 s/p s/p dsc lsc> SNEHA, LSO, omental biopsy, cystotomy repair with urology, suprapubic bladder insertion, omental flap        #Bladder injury intraoperative s/p bladder repair  #CKD w/ recent MADHU  #History of urinary retention  #Urinary incontinence  #low uop   - Drain site and suprapubic site with saturated dressings, appreciate urology recs  - Chronic kidney disease, baseline creatinine 1.3-1.6.  - MADHU with Cr 1.64>1.33>2.28>2.14. Most likely secondary to post-operative state in the setting of chronic lasix use. Renal ultrasound without evidence of hydronephrosis in the setting of extensive bladder repair.   - AM FeUrea suggesting prerenal etiology  - S/p 1 dose IV lasix 20 mg and restarted PTA lasix   - kent in place. Will discuss with Urology regarding removal.   - TINY Drain in place during hospitalization--drain creatinine is 4.8. Will discuss with urology prior to discharge.  - Suprapubic catheter: Plan for discharge to home with suprapubic catheter in place.   - Urology consulted in OR & following     Acute on chronic anemia   - Hgb 6.5 > 1u pRBC > AM Hgb 8.9  - Patient had low hgb prior to surgery and currently there is low concern for intraabdominal bleeding,     #Atrial fibrillation  - s/p Telemetry  - Ppx with apixaban 2.5 BID and plan to titrate up to 5 mg BID outpatient in 2 weeks     #HTN  - Hold PTA losartan  - PTA atenolol 100mg qd  - PTA amlodipine 10mg and losartan held. Will plan for patient to follow up with her PCP in 1 week for BP check and discussion on restarting PTA meds given low BP in house.       #S/p gastric bypass  - PRN antiemetics  - Bowel regimen     #LE edema  - restarted PTA lasix     #Paranoid schizophrenia  - Continue PTA seorquel, zoloft, cogentin, vistaril, ambien     #Postoperative state:  Dz: Serous uterine carcinoma; hx of cervical cancer s/p chemo/rad '90s; hx VAIN s/p laser  FEN: Regular diet  Pain: Chronic pain - PTA oxy; here 5-10 Q4H, Tylenol   CV: Afib-restart Abixaban at prophylactic dose with plan to start at therapeutic dose in 2 weeks, HTN- PTA Amlodipine 10, Losartan [HELD] as above, Atenolol 100 (ord'd)   Pulm: NI  GI: Antemetics, stool softener; s/p gastric bypass   ID: No issues.  Endo: NI  Psych/Neuro/MSK: Schizophrenia- PTA Seroquel, Zoloft, cogentin, ambien (ord'd half); osteoarthritis; LE edema- PTA lasix 40 every day [HELD]   PPX: SCDs, IS, apixaban  Dispo: TBD  Disposition: pending post operative course    Patient discussed and seen with Dr. Lona Plaza MD  Ob/Gyn Resident, PGY-4  07/16/2024 6:18 AM     To reach the GYNECOLOGY ONCOLOGY team for this patient, please page [phone]

## 2024-07-15 NOTE — PLAN OF CARE
Goal Outcome Evaluation:      Plan of Care Reviewed With: patient    Overall Patient Progress: no changeOverall Patient Progress: no change    Px alert and oriented x4, able to make needs known. C/o pain on abdomen rated 7-10 on pain scale, PRN oxycodone given. No c/o nausea. Will tell how she wants to be positioned. Did not ate breakfast this morning. Refused some meds. Repositioned per request. Slept in between cares. Still needs teaching on suprapubic cath cares. Continue with POC.

## 2024-07-15 NOTE — PROGRESS NOTES
Gynecology Oncology Progress Note  07/15/24       POD#3 s/p s/p dsc lsc> SNEHA, LSO, omental biopsy, cystotomy repair, suprapubic bladder insertion, omental flap     Disease: Serous uterine carcinoma; hx of cervical cancer s/p chemo/rad '90s; hx VAIN s/p laser    24 hour events:   - Hgb 6.5 > 1u pRBC > 8.9  - lasix x1 & 500 bolus  - renal US: Normal renal ultrasound. No hydronephrosis.     Subjective: Patient is doing well this morning.  Pain is moderately controlled, taking oxycodone at this moment. Tolerating PO. Passing flatus, Shahid in place. Ambulating without dizziness. Denies chest pain, SOB, nausea/vomiting, fevers/chills, dizziness.    Objective:   Patient Vitals for the past 24 hrs:   BP Temp Temp src Pulse Resp SpO2 Weight   07/15/24 0848 106/67 97.5  F (36.4  C) Oral 56 16 100 % 81.3 kg (179 lb 3.7 oz)   07/15/24 0111 120/80 97.5  F (36.4  C) Oral 58 16 98 % --   07/14/24 2104 98/58 97.6  F (36.4  C) Oral 53 16 100 % --   07/14/24 1559 116/67 97.3  F (36.3  C) Oral 53 16 100 % --   07/14/24 1500 99/59 98  F (36.7  C) Oral 50 16 100 % --   07/14/24 1300 -- -- -- -- -- 100 % --   07/14/24 1239 99/58 -- -- (!) 48 -- 100 % --   07/14/24 1220 96/75 97.4  F (36.3  C) Oral (!) 49 16 100 % --   07/14/24 1143 104/69 97.4  F (36.3  C) Oral 50 16 100 % --   07/14/24 1046 119/70 -- -- -- -- 100 % --   07/14/24 1044 117/70 97  F (36.1  C) Oral 54 17 100 % --   07/14/24 1015 106/67 -- -- -- -- 99 % --   07/14/24 0931 103/65 97.8  F (36.6  C) Oral -- 16 100 % --   07/14/24 0916 104/70 97.9  F (36.6  C) Oral 53 16 -- --       General: NAD, appears comfortable in bed   CV: RRR, no m/r/g  Resp: CTAB, no wheezes, shallow breathing suspected d/t splinting  Abdomen: soft, appropriately tender, non distended  Incision:  c/d/i, dressing without shadowing  Extremities: warm, well-perfused, nontender, chronic edema, 1+, SCDs not in place  Lines: urethral catheter with scant yellow  urine, suprapubic catheter with serosanguinous  stained urine, left justin drain with scant serosanguinous fluid    I/Os  (Yesterday // Since Midnight)   mL // NR mL  IVF 1500 mL // NR mL  Urine 1175 mL // 375 mL  Drain 10.5 mL // 5 mL  Stool x2    New Labs/Imaging-   Results for orders placed or performed during the hospital encounter of 07/12/24 (from the past 24 hour(s))   US Renal Complete Non-Vascular    Narrative    EXAM: Renal ultrasound 7/14/2024 10:44 AM     HISTORY: SNEHA, BSO for uterine cancer in prior radiated field large  bladder injury with repair from urology near trigone, closer to right  to evaluate for hydronephrosis in setting of potential UO compromise.    COMPARISON: CT 5/3/2024, 4/16/2023.    TECHNIQUE: The kidneys and bladder were scanned in the standard  fashion with specialized ultrasound transducer(s) using both gray  scale and limited color/spectral Doppler techniques.    FINDINGS:    RIGHT KIDNEY: Measures 10.5 cm in length. Parenchyma is of normal  thickness and echogenicity. No focal mass or calculus. No  hydronephrosis.     LEFT KIDNEY: Measures 9.9 cm in length. Parenchyma is of normal  thickness and echogenicity. No focal mass or calculus. No  hydronephrosis.     BLADDER: Decompressed with a Shahid catheter in place.      Impression    IMPRESSION: Normal renal ultrasound. No hydronephrosis.    I have personally reviewed the examination and initial interpretation  and I agree with the findings.    BERNARD SOLORIO MD         SYSTEM ID:  [id number]   Urea nitrogen random urine with Creat Ratio   Result Value Ref Range    Urea Nitrogen Urine mg/dL 309.0 (L) 801.0 - 1,666.0 mg/dL   Creatinine random urine   Result Value Ref Range    Creatinine Urine mg/dL 69.5 mg/dL   Basic metabolic panel   Result Value Ref Range    Sodium 133 (L) 135 - 145 mmol/L    Potassium 3.7 3.4 - 5.3 mmol/L    Chloride 97 (L) 98 - 107 mmol/L    Carbon Dioxide (CO2) 25 22 - 29 mmol/L    Anion Gap 11 7 - 15 mmol/L    Urea Nitrogen 29.7 (H) 8.0 - 23.0 mg/dL     Creatinine 2.14 (H) 0.51 - 0.95 mg/dL    GFR Estimate 24 (L) >60 mL/min/1.73m2    Calcium 8.2 (L) 8.8 - 10.2 mg/dL    Glucose 82 70 - 99 mg/dL   CBC with platelets   Result Value Ref Range    WBC Count 11.2 (H) 4.0 - 11.0 10e3/uL    RBC Count 3.83 3.80 - 5.20 10e6/uL    Hemoglobin 8.9 (L) 11.7 - 15.7 g/dL    Hematocrit 28.7 (L) 35.0 - 47.0 %    MCV 75 (L) 78 - 100 fL    MCH 23.2 (L) 26.5 - 33.0 pg    MCHC 31.0 (L) 31.5 - 36.5 g/dL    RDW 22.1 (H) 10.0 - 15.0 %    Platelet Count 385 150 - 450 10e3/uL     Assessment: Alis Hartman is a 69 year old who is POD#3 s/p s/p dsc lsc> SNEHA, LSO, omental biopsy, cystotomy repair with urology, suprapubic bladder insertion, omental flap        #Bladder injury intraoperative s/p bladder repair  #CKD w/ recent MADHU  #History of urinary retention  #Urinary incontinence  #low uop   - Chronic kidney disease, baseline creatinine 1.3-1.6.  - MADHU with Cr 1.64>1.33>2.28>2.14. Most likely secondary to post-operative state in the setting of chronic lasix use. Renal ultrasound without evidence of hydronephrosis in the setting of extensive bladder repair.   - AM FeUrea suggesting prerenal etiology  - S/p 1 dose IV lasix 20 mg.  Restart PTA lasix today.   - kent in place. Plan for removal of transurethral catheter prior to discharge if drain creatinine normal. If kent catheter naturally falls out due to urethral laxity, do NOT need to replace.   - TINY Drain in place during hospitalization--check drain creatinine prior to discharge and if normal will discontinue prior to discharge.   -Suprapubic catheter: Plan for discharge to home with suprapubic catheter in place.   - Urology consulted in OR & following     Acute on chronic anemia   - Hgb 6.5 > 1u pRBC > AM Hgb 8.9  - Patient had low hgb prior to surgery and currently there is low concern for intraabdominal bleeding,     #Atrial fibrillation  - s/p Telemetry  - Transition from Lovenox to apixaban this AM     #HTN  - Hold PTA  losartan  - PTA atenolol 100mg qd  - PTA amlodipine 10mg and losartan held. Will plan for patient to follow up with her PCP in 1 week for BP check and discussion on restarting PTA meds given low BP in house.      #S/p gastric bypass  - PRN antiemetics  - Bowel regimen     #LE edema  - restarted PTA lasix today     #Paranoid schizophrenia  - Continue PTA seorquel, zoloft, cogentin, vistaril, ambien     #Postoperative state:  Dz: Serous uterine carcinoma; hx of cervical cancer s/p chemo/rad '90s; hx VAIN s/p laser  FEN: ADAT, mIVF   Pain: Chronic pain - PTA oxy; here 5-10 Q4H, Tylenol   CV: Afib-restart Abixaban at prophylactic dose today with plan to start at therapeutic dose in 2 weeks, HTN- PTA Amlodipine 10, Losartan [HELD] as above, Atenolol 100 (ord'd)   Pulm: NI  GI: Antemetics, stool softener; s/p gastric bypass   ID: No issues.  Endo: NI  Psych/Neuro/MSK: Schizophrenia- PTA Seroquel, Zoloft, cogentin, ambien (ord'd half); osteoarthritis; LE edema- PTA lasix 40 every day [HELD]   PPX: SCDs, IS, lovenox.  Dispo: TBD  Disposition: pending post operative course, likely tomorrow    Patient discussed and seen with Dr. Lona Plaza MD  Ob/Gyn Resident, PGY-4  07/15/2024 9:06 AM     To reach the GYNECOLOGY ONCOLOGY team for this patient, please page [phone]

## 2024-07-15 NOTE — PLAN OF CARE
Goal Outcome Evaluation:    POD# 2 s/p Ex lap, SNEHA, BSO. Patient is A&O x 4, pain well controlled with Oxycodone and Tylenol, denies nausea. PIV infusing. TINY in place. Shahid and SP catheter patent. SCDs in place, IS at bedside. Continuous pulse oxymetry. Call light in reach.

## 2024-07-15 NOTE — CONSULTS
Care Management Initial Consult    General Information  Assessment completed with: Patient,    Type of CM/SW Visit: Initial Assessment  Primary Care Provider verified and updated as needed: Yes   Readmission within the last 30 days: no previous admission in last 30 days      Reason for Consult: discharge planning  Advance Care Planning: Advance Care Planning Reviewed: no concerns identified        Communication Assessment  Patient's communication style: spoken language (English or Bilingual)    Hearing Difficulty or Deaf: no   Wear Glasses or Blind: yes    Cognitive  Cognitive/Neuro/Behavioral: WDL  Level of Consciousness: alert  Arousal Level: opens eyes spontaneously  Orientation: oriented x 4  Mood/Behavior: calm, cooperative  Best Language: 0 - No aphasia  Speech: clear, spontaneous, logical    Living Environment:   People in home: grandchild(vernell), child(vernell), adult  lives with grandchildren  Current living Arrangements: house      Able to return to prior arrangements: yes     Family/Social Support:  Care provided by: child(vernell)  Provides care for: no one  Marital Status: Single  Children, Other (specify) (grandchildren)          Description of Support System: Supportive    Support Assessment: Adequate family and caregiver support, Adequate social supports    Current Resources:   Patient receiving home care services: No  Community Resources: County Worker  Equipment currently used at home: cane, straight, walker, standard, shower chair  Supplies currently used at home: None    Employment/Financial:  Employment Status: retired     Financial Concerns: none   Referral to Financial Worker: No       Does the patient's insurance plan have a 3 day qualifying hospital stay waiver?  No    Lifestyle & Psychosocial Needs: (pulled from chart)  Social Determinants of Health     Food Insecurity: No Food Insecurity (4/13/2023)    [facility], [facility]    Hunger Vital Sign     Worried About  Running Out of Food in the Last Year: Never true     Ran Out of Food in the Last Year: Never true   Depression: Not at risk (2/28/2024)    PHQ-2     PHQ-2 Score: 2   Housing Stability: Not on file   Tobacco Use: Low Risk  (7/11/2024)    Patient History     Smoking Tobacco Use: Never     Smokeless Tobacco Use: Never     Passive Exposure: Not on file   Financial Resource Strain: Not on file   Alcohol Use: Not on file   Transportation Needs: Not on file   Physical Activity: Not on file   Interpersonal Safety: Not on file   Stress: Not on file   Social Connections: Not on file   Health Literacy: Not on file       Functional Status:  Prior to admission patient needed assistance:   Dependent ADLs:: Independent  Dependent IADLs:: Cleaning, Cooking, Laundry, Transportation, Meal Preparation       Mental Health Status:  Mental Health Status: Current Concern  Mental Health Management: Medication    Chemical Dependency Status:  Chemical Dependency Status: No Current Concerns             Values/Beliefs:  Spiritual, Cultural Beliefs, Hoahaoism Practices, Values that affect care: no               Additional Information:  Pt is a 69 year old female w/ serous uterine carcinoma who is is now POD#3 s/p dsc lsc> SNEHA, LSO, omental biopsy, cystotomy repair with urology, suprapubic catheter insertion, and omental flap.    RNCC completed CMA due to elevated risk score and need for discharge planning. RNCC met with pt at bedside and introduced RNCC role. Pt states she live in a home with her daughter and 2 adult grandchildren. Pt's grandchildren help her with all ADL and IADLs as needed. Pt has canes, walkers, grab bar, and shower chairs in the home. Pt does not have any skilled home care but states she is working on getting set up with PCA services through [facility]. Pt states she also had additional support from her sister and niece. Pt is retired on social security. Family will provide discharge transportation when pt is  medically ready.    RNCC spoke with pt regarding getting SN to assist with new superpubic catheter. Pt agreeable to home care. RNCC sent referral to [facility] and [facility] has accepted. RNCC updated pt regarding acceptance and added home care order. Pt told RNCC that her family was in the process of moving and provided RNCC was her new address. RNCC updated [facility] that pt will not be ready for discharge until tomorrow and provided them with her new address. No other discharge needs identified. RNCC will continue to follow and assist with needs as they arise.    New address:  [address] [place] [id number]    Dre Shin RN BSN  5A RN Care Coordinator   Ph: [phone]  Pager: [phone]

## 2024-07-16 ENCOUNTER — APPOINTMENT (OUTPATIENT)
Dept: OCCUPATIONAL THERAPY | Facility: CLINIC | Age: 69
DRG: 740 | End: 2024-07-16
Attending: OBSTETRICS & GYNECOLOGY
Payer: COMMERCIAL

## 2024-07-16 ENCOUNTER — TELEPHONE (OUTPATIENT)
Dept: UROLOGY | Facility: CLINIC | Age: 69
End: 2024-07-16
Payer: COMMERCIAL

## 2024-07-16 LAB
CREAT SERPL-MCNC: 1.59 MG/DL (ref 0.51–0.95)
EGFRCR SERPLBLD CKD-EPI 2021: 34 ML/MIN/1.73M2
PATH REPORT.COMMENTS IMP SPEC: NORMAL
PATH REPORT.COMMENTS IMP SPEC: NORMAL
PATH REPORT.FINAL DX SPEC: NORMAL
PATH REPORT.GROSS SPEC: NORMAL
PATH REPORT.MICROSCOPIC SPEC OTHER STN: NORMAL

## 2024-07-16 PROCEDURE — 120N000002 HC R&B MED SURG/OB UMMC

## 2024-07-16 PROCEDURE — 250N000013 HC RX MED GY IP 250 OP 250 PS 637

## 2024-07-16 PROCEDURE — 97530 THERAPEUTIC ACTIVITIES: CPT | Mod: GO

## 2024-07-16 PROCEDURE — 36415 COLL VENOUS BLD VENIPUNCTURE: CPT | Performed by: STUDENT IN AN ORGANIZED HEALTH CARE EDUCATION/TRAINING PROGRAM

## 2024-07-16 PROCEDURE — 82565 ASSAY OF CREATININE: CPT | Performed by: STUDENT IN AN ORGANIZED HEALTH CARE EDUCATION/TRAINING PROGRAM

## 2024-07-16 PROCEDURE — 250N000013 HC RX MED GY IP 250 OP 250 PS 637: Performed by: OBSTETRICS & GYNECOLOGY

## 2024-07-16 RX ORDER — FUROSEMIDE 20 MG
20 TABLET ORAL DAILY
Qty: 14 TABLET | Refills: 0 | Status: SHIPPED | OUTPATIENT
Start: 2024-07-17 | End: 2024-07-21

## 2024-07-16 RX ORDER — ESTRADIOL 0.1 MG/G
CREAM VAGINAL
Qty: 42.5 G | Refills: 3 | Status: CANCELLED | OUTPATIENT
Start: 2024-07-18

## 2024-07-16 RX ORDER — OXYCODONE HYDROCHLORIDE 5 MG/1
5 TABLET ORAL EVERY 4 HOURS PRN
Qty: 70 TABLET | Refills: 0 | Status: ON HOLD | OUTPATIENT
Start: 2024-07-16 | End: 2024-07-25

## 2024-07-16 RX ORDER — OXYBUTYNIN CHLORIDE 5 MG/1
5 TABLET ORAL 3 TIMES DAILY
Status: DISCONTINUED | OUTPATIENT
Start: 2024-07-16 | End: 2024-07-17 | Stop reason: HOSPADM

## 2024-07-16 RX ORDER — AMOXICILLIN 250 MG
1 CAPSULE ORAL 2 TIMES DAILY
Qty: 28 TABLET | Refills: 0 | Status: ON HOLD | OUTPATIENT
Start: 2024-07-16 | End: 2024-08-09

## 2024-07-16 RX ORDER — OXYBUTYNIN CHLORIDE 5 MG/1
5 TABLET ORAL 3 TIMES DAILY
Qty: 39 TABLET | Refills: 0 | Status: ON HOLD | OUTPATIENT
Start: 2024-07-17 | End: 2024-08-09

## 2024-07-16 RX ADMIN — ZOLPIDEM TARTRATE 5 MG: 5 TABLET ORAL at 21:52

## 2024-07-16 RX ADMIN — OXYCODONE HYDROCHLORIDE 5 MG: 5 TABLET ORAL at 03:21

## 2024-07-16 RX ADMIN — OXYBUTYNIN CHLORIDE 5 MG: 5 TABLET ORAL at 15:27

## 2024-07-16 RX ADMIN — APIXABAN 2.5 MG: 2.5 TABLET, FILM COATED ORAL at 20:58

## 2024-07-16 RX ADMIN — SERTRALINE HYDROCHLORIDE 25 MG: 25 TABLET ORAL at 07:44

## 2024-07-16 RX ADMIN — OXYCODONE HYDROCHLORIDE 5 MG: 5 TABLET ORAL at 17:54

## 2024-07-16 RX ADMIN — OXYCODONE HYDROCHLORIDE 5 MG: 5 TABLET ORAL at 07:46

## 2024-07-16 RX ADMIN — APIXABAN 2.5 MG: 2.5 TABLET, FILM COATED ORAL at 07:44

## 2024-07-16 RX ADMIN — OXYCODONE HYDROCHLORIDE 10 MG: 5 TABLET ORAL at 12:02

## 2024-07-16 RX ADMIN — FUROSEMIDE 20 MG: 20 TABLET ORAL at 07:44

## 2024-07-16 RX ADMIN — OXYCODONE HYDROCHLORIDE 5 MG: 5 TABLET ORAL at 21:48

## 2024-07-16 RX ADMIN — BISACODYL 10 MG: 10 SUPPOSITORY RECTAL at 07:46

## 2024-07-16 RX ADMIN — BENZTROPINE MESYLATE 1 MG: 1 TABLET ORAL at 07:44

## 2024-07-16 RX ADMIN — ACETAMINOPHEN 650 MG: 325 TABLET, FILM COATED ORAL at 15:27

## 2024-07-16 RX ADMIN — ACETAMINOPHEN 650 MG: 325 TABLET, FILM COATED ORAL at 07:46

## 2024-07-16 RX ADMIN — ATENOLOL 100 MG: 25 TABLET ORAL at 07:50

## 2024-07-16 RX ADMIN — SENNOSIDES AND DOCUSATE SODIUM 2 TABLET: 50; 8.6 TABLET ORAL at 07:44

## 2024-07-16 RX ADMIN — OXYBUTYNIN CHLORIDE 5 MG: 5 TABLET ORAL at 20:58

## 2024-07-16 RX ADMIN — ACETAMINOPHEN 650 MG: 325 TABLET, FILM COATED ORAL at 20:58

## 2024-07-16 ASSESSMENT — ACTIVITIES OF DAILY LIVING (ADL)
ADLS_ACUITY_SCORE: 46
ADLS_ACUITY_SCORE: 44
ADLS_ACUITY_SCORE: 44
ADLS_ACUITY_SCORE: 46
ADLS_ACUITY_SCORE: 46
ADLS_ACUITY_SCORE: 45
ADLS_ACUITY_SCORE: 46
ADLS_ACUITY_SCORE: 44
ADLS_ACUITY_SCORE: 45
ADLS_ACUITY_SCORE: 46
ADLS_ACUITY_SCORE: 46
ADLS_ACUITY_SCORE: 44
ADLS_ACUITY_SCORE: 46
ADLS_ACUITY_SCORE: 45
ADLS_ACUITY_SCORE: 46
ADLS_ACUITY_SCORE: 44
ADLS_ACUITY_SCORE: 44
ADLS_ACUITY_SCORE: 46
ADLS_ACUITY_SCORE: 44
ADLS_ACUITY_SCORE: 46
ADLS_ACUITY_SCORE: 46

## 2024-07-16 NOTE — PROGRESS NOTES
"Brief Urology Progress Note     /63 (BP Location: Left arm)   Pulse 54   Temp 97.9  F (36.6  C) (Oral)   Resp 16   Ht 1.6 m (5' 3\")   Wt 81.3 kg (179 lb 3.7 oz)   SpO2 100%   BMI 31.75 kg/m      Recent Labs   Lab Test 07/15/24  0500 07/14/24  0609 07/13/24  0218   WBC 11.2* 12.4* 12.9*   HGB 8.9* 6.5* 7.2*   CR 2.14* 2.28* 1.33*      TINY Cr 4.8  TINY outputs 45/30    Assessment/Plan  69 year old female with history of cervical cancer and radiation POD#4 s/p hysterectomy (GYN ONC) c/b cystotomy s/p intraop repair now with SPT and kent in place. Patient with Cr elevation, now downtrending with improved UOP. TINY Cr elevated on check yesterday.     Plan:  -  Catheter and SPT to both remain to drainage on discharge, until first follow-up  - Given radiation history and small high-pressure bladder with incontinence preop, plan for suprapubic tube to reman in place for bladder drainage long-term, per patient preference  - Given elevated Cr and current output level, would recommend leaving TINY in place on discharge  - Plan for follow-up in 2-3 weeks; cystogram likely in 6-8 weeks   - Urology to schedule follow-up and will sign off at this time; please page with questions or concerns     Plan discussed with staff, Dr. Mornoy.    Yeny Wolf MD  Urology PGY-4     "

## 2024-07-16 NOTE — PROGRESS NOTES
"Brief gyn onc note  7/16/2024  12:30 PM    Patient has been having lower abdominal pain around the sites of her suprapubic catheter and drain.      /75 (BP Location: Left arm)   Pulse 59   Temp 97.6  F (36.4  C) (Oral)   Resp 17   Ht 1.6 m (5' 3\")   Wt 81.3 kg (179 lb 3.7 oz)   SpO2 98%   BMI 31.75 kg/m    Gen: NAD, Uncomfortable during dressing changes and while lying flat for boost up in bed  Abd: soft, tender to light palpation in the lower quadrants, non distended  Extremities: 1+ edema bilaterally  Incision: Dressings around drain and suprapubic catheter site are saturated with serosanguinous fluid.   Lines/Drains: Drain with small amount of blood fluid.      - Dressings changed at suprapubic and drain site. Will monitor for saturation.  - Urology notified this morning about dressings being saturated. Will notify again.     Addendum:   Talked to Urology: Low concern for infection at site given exam findings. Most likely this is a leakage of urine at the site. Will have nursing check for kinks in catheter lines. Urology will also order oxybutynin for bladder spasms.     Heather Cruz M.D.  [facility]  Obstetrics and Gynecology, PGY1  7/16/2024    "

## 2024-07-16 NOTE — PLAN OF CARE
[id number].    VSS on room air. A&Ox4. Abdominal pain managed with Tylenol and oxycodone. Denies nausea and vomiting. Assist x2 with gait belt and walker but not OOB during shift. Wound on right shin, UTV due to dressing. Moderate edema of extremities. TINY x1 to bulb, serosanguinous output, WNL. Low abdominal incision, WNL. R shin blsiter, UTV, dressing CDI. Suprapubic and kent catheter, WNL. No BM today but passing flatus. Regular diet, fair intake. R-PIV, SL. Plan for discharge tomorrow.

## 2024-07-16 NOTE — TELEPHONE ENCOUNTER
This writer called patient to let her know about her follow up appointment with Dr. Monroy.  Call rang and then went to a busy signal.  [employer] message sent.     Ayaka Weir RN

## 2024-07-16 NOTE — PLAN OF CARE
Goal Outcome Evaluation:      Plan of Care Reviewed With: patient    Overall Patient Progress: no changeOverall Patient Progress: no change    POD 3->4  VSS on RA. Pain managed by oxycodone x1. Denies n/v, SOB. PIV saline locked. Suprapubic and kent catheters in place with AUOP. TINY to bulb suction with minimal serosang output. Ax1-2 when up. Passing flatus, LBM 7/14. Continue with POC.

## 2024-07-16 NOTE — PROGRESS NOTES
"  Goal Outcome Evaluation:     Vital Signs: /62   Pulse 52   Temp 97.3  F (36.3  C) (Oral)   Resp 16   Ht 1.6 m (5' 3\")   Wt 81.3 kg (179 lb 3.7 oz)   SpO2 100%   BMI 31.75 kg/m         Patient is A&Ox4, forget, but able to make needs known. VSS on RA, denies cardiac chest pain or SOB. C/O abd/incision pain, managed with PRN Oxycodone 5-10mg q4hrs, and scheduled Tylenol. Tolerating regular diet, denies nausea.  Suprapubic and kent catheters in place with AUOP, TINY to bulb suction with minimal serosang output. SP cath and TINY site leaking, dressing changed x2 during this shift. Pt is up with A1 to the recliner chair or the bedside commode. Had BM today. Unable to discharge this cassi per MD due to post op PCP appointment confirmation with all the med changes, but will be ready tomorrow am. Will continue to monitor and assess.   "

## 2024-07-16 NOTE — PROGRESS NOTES
Brief gyn onc note  7/16/2024  9:03 AM    0840Called [facility] for follow up with Dr. Eckert in 1 week. Scheduling notified that Dr. Eckert has a full schedule and will need to talk to Dr. Eckert about adding patient on. Scheduling will call back or page team with plan. They will also contact the patient about timing.    0855: Called patient's daughter (Joy). Discussed that patient has been doing well post operatively and that planning for discharge soon. Reviewed medication changes to PTA BP meds and anticoagulation.   - Atenolol restarted at PTA dosage  - Amlodipine, hydrochlorothiazide, and losartan held  - Lasix restarted at 1/2 PTA dosage   - Apixiban restarted at 1/2 PTA dosage for DVT prophylaxis. With plan that it is safe to increase this back to PTA dosage (5mg BID) for therapeutic effects at about 2 weeks post op.    Will also have these medication instructions listed in her discharge instructions. Discussed that patient will be scheduled for follow up with PCP to manage these medication adjustments. She will also follow up with urology to manage catheters and drain. She will also follow up with Dr. Hyde. No questions at this time.    Heather Cruz M.D.  [facility]  Obstetrics and Gynecology, PGY1  7/16/2024

## 2024-07-16 NOTE — PROGRESS NOTES
Gynecology Oncology Progress Note  07/17/24       POD#5 s/p s/p dsc lsc> SNEHA, LSO, omental biopsy, cystotomy repair, suprapubic bladder insertion, omental flap     Disease: Serous uterine carcinoma; hx of cervical cancer s/p chemo/rad '90s; hx VAIN s/p laser    24 hour events:   - Urology recommends leaving drain in at discharge due to Cr 4.8 (serum 2.28)   - Serum now Cr 1.59    Subjective: Patient is doing okay this AM. Her lower left quadrant abdominal pain is currently moderately well controlled. Tolerating PO. Passing flatus, and having BM. Shahid in place. Ambulating without dizziness. Denies chest pain, SOB, nausea/vomiting, fevers/chills, dizziness.    Objective:   Patient Vitals for the past 24 hrs:   BP Temp Temp src Pulse Resp SpO2   07/17/24 0437 125/58 97.3  F (36.3  C) Oral 54 16 100 %   07/17/24 0025 127/63 97.8  F (36.6  C) Oral 53 16 100 %   07/16/24 1611 109/62 -- -- 52 -- --   07/16/24 1543 -- 97.3  F (36.3  C) Oral 50 16 100 %   07/16/24 0751 119/75 97.6  F (36.4  C) Oral 59 17 98 %       General: NAD  CV: RRR, no m/r/g  Resp: CTAB, no wheezes, shallow breathing suspected d/t splinting  Abdomen: soft, appropriately tender, non distended  Incision:  c/d/i, dressing without shadowing  Extremities: warm, well-perfused, nontender, chronic edema, 1+, SCDs not in place  Lines: urethral catheter with scant yellow  urine, suprapubic catheter clear yellow urine, left justin drain with  scant bloody fluid    I/Os  (Yesterday // Since Midnight)   mL // NR   IVF 0 mL // 0 mL  Urine 1377 mL // 235 mL  Drain 50 mL // 5 mL  Stool x1    New Labs/Imaging-   Results for orders placed or performed during the hospital encounter of 07/12/24 (from the past 24 hour(s))   Creatinine   Result Value Ref Range    Creatinine 1.59 (H) 0.51 - 0.95 mg/dL    GFR Estimate 34 (L) >60 mL/min/1.73m2     Assessment: Alis Hartman is a 69 year old who is POD#5 s/p s/p dsc lsc> SNEHA, LSO, omental biopsy, cystotomy repair  with urology, suprapubic bladder insertion, omental flap. Plan for discharge to home today.     #Bladder injury intraoperative s/p bladder repair  #CKD w/ recent MADHU  #History of urinary retention  #Urinary incontinence  #low uop   - Drain site and suprapubic site with saturated dressings, appreciate urology recs  - Chronic kidney disease, baseline creatinine 1.3-1.6.  - MADHU with Cr 1.64>1.33>2.28>2.14>1.59. Most likely secondary to post-operative state in the setting of chronic lasix use. Renal ultrasound without evidence of hydronephrosis in the setting of extensive bladder repair.   - AM FeUrea suggesting prerenal etiology  - S/p 1 dose IV lasix 20 mg and restarted PTA lasix   - kent in place. Kent to remain in place at discharge, per urology. Will follow up.   - TINY Drain in place during hospitalization--drain creatinine is 4.8. Leave in place at discharge and urology will see her at follow up.   - Suprapubic catheter: Plan for discharge to home with suprapubic catheter in place.   - Urology consulted in OR & following  - Patient was given dressings (gauze, drain sponges, medipore tape) for suprapubic catheter and drain site. Dressing changed prior to discharge this morning.      Acute on chronic anemia   - Hgb 6.5 > 1u pRBC > AM Hgb 8.9  - Patient had low hgb prior to surgery and currently there is low concern for intraabdominal bleeding,     #Atrial fibrillation  - s/p Telemetry  - Ppx with apixaban 2.5 BID and plan to titrate up to 5 mg BID outpatient in 2 weeks  - Follow up with PCP to titrate up     #HTN  - Hold PTA losartan  - PTA atenolol 100mg qd  - PTA amlodipine 10mg and losartan held. Will plan for patient to follow up with her PCP in 1 week for BP check and discussion on restarting PTA meds given low BP in house.   - Follow up with PCP for management as OP     #S/p gastric bypass  - PRN antiemetics  - Bowel regimen     #LE edema  - restarted PTA lasix at 20 mg with plan to increase to 40 mg  outpatient     #Paranoid schizophrenia  - Continue PTA seorquel, zoloft, cogentin, vistaril, ambien     #Postoperative state:  Dz: Serous uterine carcinoma; hx of cervical cancer s/p chemo/rad '90s; hx VAIN s/p laser  FEN: Regular diet  Pain: Chronic pain - PTA oxy; here 5-10 Q4H, Tylenol   CV: Afib-restart Abixaban at prophylactic dose with plan to start at therapeutic dose in 2 weeks, HTN- PTA Amlodipine 10, Losartan [HELD] as above, Atenolol 100 (ord'd)   Pulm: NI  GI: Antemetics, stool softener; s/p gastric bypass   ID: No issues.  Endo: NI  Psych/Neuro/MSK: Schizophrenia- PTA Seroquel, Zoloft, cogentin, ambien (ord'd half); osteoarthritis; LE edema- PTA lasix 40 every day [HELD] > lasix 20 mg  PPX: SCDs, IS, apixaban  Disposition: Today    Patient discussed and seen with Dr. Lona Cruz M.D.  [facility]  Obstetrics and Gynecology, PGY1  7/17/2024      To reach the GYNECOLOGY ONCOLOGY team for this patient, please page [phone]

## 2024-07-16 NOTE — PROGRESS NOTES
Care Management Discharge Note    Discharge Date: 07/16/2024       Discharge Disposition: Home, Home Care    Discharge Services: PCA    Discharge DME: None    Discharge Transportation: family or friend will provide    Private pay costs discussed: Not applicable    Does the patient's insurance plan have a 3 day qualifying hospital stay waiver?  No    PAS Confirmation Code: NA  Patient/family educated on Medicare website which has current facility and service quality ratings: yes    Education Provided on the Discharge Plan: Yes  Persons Notified of Discharge Plans: Provider, Home Care Agency  Patient/Family in Agreement with the Plan: yes    Handoff Referral Completed: Yes    Additional Information:  Per providers, pt medically ready for discharge. Writer updated pt's accepting home care agency and verified they had pt's updated address. Per providers, pt will be seen by her PCP in necessary timeframe. No other discharge needs or concerns.     Discharge Resources  Home Care with [facility]/ RN  Phone [phone]  Fax [phone]    New address:  [address] [place] [address]    ADDENDUM: Writer notified by providers, that pt will stay one more night in the hospital and discharge 7/17am. Writer updated home care agency.     Rocio Redding RNCC  Covering for 5A  Phone [phone]    RN Care Coordinator     Social Work and Care Management Department      SEARCHABLE in [facility] - search CARE COORDINATOR       [place] ([phone]) Saturday & Sunday; ([phone]) [facility] Recognized Holidays     Units: 5A Onc Vocera & 5C Vocera Pager: [phone]    Units: 6B Vocera & 6C Vocera  Pager: [phone]    Units: 7A SOT RNCC Vocera, 7B Med Surg Vocera, & 7C Med Surg Vocera  Pager: [phone]    Units: 6A Vocera & 4A CVICU Vocera, 4C MICU Vocera, and 4E SICU Vocera   Pager: [phone]    Units: 5 Ortho Vocera & 5 Med Surg Vocera  Pager: [phone]    Units: 6 Med Surg Vocera & 8  Med Surg Vocera  Pager [phone]

## 2024-07-17 ENCOUNTER — APPOINTMENT (OUTPATIENT)
Dept: PHYSICAL THERAPY | Facility: CLINIC | Age: 69
DRG: 740 | End: 2024-07-17
Attending: OBSTETRICS & GYNECOLOGY
Payer: COMMERCIAL

## 2024-07-17 VITALS
HEIGHT: 63 IN | TEMPERATURE: 97.4 F | WEIGHT: 179.23 LBS | DIASTOLIC BLOOD PRESSURE: 54 MMHG | BODY MASS INDEX: 31.76 KG/M2 | OXYGEN SATURATION: 100 % | SYSTOLIC BLOOD PRESSURE: 120 MMHG | RESPIRATION RATE: 16 BRPM | HEART RATE: 52 BPM

## 2024-07-17 LAB
PATH REPORT.COMMENTS IMP SPEC: ABNORMAL
PATH REPORT.COMMENTS IMP SPEC: YES
PATH REPORT.FINAL DX SPEC: ABNORMAL
PATH REPORT.GROSS SPEC: ABNORMAL
PATH REPORT.MICROSCOPIC SPEC OTHER STN: ABNORMAL
PATH REPORT.RELEVANT HX SPEC: ABNORMAL
PATHOLOGY SYNOPTIC REPORT: ABNORMAL
PHOTO IMAGE: ABNORMAL

## 2024-07-17 PROCEDURE — 250N000013 HC RX MED GY IP 250 OP 250 PS 637: Performed by: OBSTETRICS & GYNECOLOGY

## 2024-07-17 PROCEDURE — 97530 THERAPEUTIC ACTIVITIES: CPT | Mod: GP | Performed by: PHYSICAL THERAPIST

## 2024-07-17 PROCEDURE — 97161 PT EVAL LOW COMPLEX 20 MIN: CPT | Mod: GP | Performed by: PHYSICAL THERAPIST

## 2024-07-17 PROCEDURE — 250N000013 HC RX MED GY IP 250 OP 250 PS 637

## 2024-07-17 PROCEDURE — 97116 GAIT TRAINING THERAPY: CPT | Mod: GP | Performed by: PHYSICAL THERAPIST

## 2024-07-17 RX ADMIN — FAMOTIDINE 20 MG: 20 TABLET ORAL at 08:26

## 2024-07-17 RX ADMIN — BENZTROPINE MESYLATE 1 MG: 1 TABLET ORAL at 08:34

## 2024-07-17 RX ADMIN — APIXABAN 2.5 MG: 2.5 TABLET, FILM COATED ORAL at 08:26

## 2024-07-17 RX ADMIN — ACETAMINOPHEN 650 MG: 325 TABLET, FILM COATED ORAL at 08:26

## 2024-07-17 RX ADMIN — OXYBUTYNIN CHLORIDE 5 MG: 5 TABLET ORAL at 13:46

## 2024-07-17 RX ADMIN — ATENOLOL 100 MG: 25 TABLET ORAL at 08:34

## 2024-07-17 RX ADMIN — SENNOSIDES AND DOCUSATE SODIUM 2 TABLET: 50; 8.6 TABLET ORAL at 08:26

## 2024-07-17 RX ADMIN — SERTRALINE HYDROCHLORIDE 25 MG: 25 TABLET ORAL at 08:26

## 2024-07-17 RX ADMIN — ACETAMINOPHEN 650 MG: 325 TABLET, FILM COATED ORAL at 13:46

## 2024-07-17 RX ADMIN — FUROSEMIDE 20 MG: 20 TABLET ORAL at 08:26

## 2024-07-17 RX ADMIN — OXYCODONE HYDROCHLORIDE 5 MG: 5 TABLET ORAL at 13:46

## 2024-07-17 RX ADMIN — ACETAMINOPHEN 650 MG: 325 TABLET, FILM COATED ORAL at 01:08

## 2024-07-17 RX ADMIN — OXYBUTYNIN CHLORIDE 5 MG: 5 TABLET ORAL at 08:34

## 2024-07-17 ASSESSMENT — ACTIVITIES OF DAILY LIVING (ADL)
ADLS_ACUITY_SCORE: 47
ADLS_ACUITY_SCORE: 46
ADLS_ACUITY_SCORE: 47
ADLS_ACUITY_SCORE: 46
ADLS_ACUITY_SCORE: 47
ADLS_ACUITY_SCORE: 46
ADLS_ACUITY_SCORE: 47
ADLS_ACUITY_SCORE: 47
ADLS_ACUITY_SCORE: 46
ADLS_ACUITY_SCORE: 47

## 2024-07-17 NOTE — DISCHARGE SUMMARY
Discharge home @ 174    Discharge paperwork was reviewed with patient daughter. Daughter expressed understanding. A copy was given to her. Pt discharge home, daughter picked her up from the unit with staff escort via w/c. Discharge medications pickup up earlier by nursing staff per pt daughter request. Pt daughter cross checked the picked up medication in the presence of a staff and acknowledged all the meds were in there.  All patient belongings were taken with patient.

## 2024-07-17 NOTE — PROGRESS NOTES
Care Management Discharge Note    Discharge Date: 07/17/2024  Discharge Disposition: Home  Discharge Services: PCA, Home Care  Discharge DME: None  Discharge Transportation: family or friend will provide  Private pay costs discussed: Not applicable  Does the patient's insurance plan have a 3 day qualifying hospital stay waiver?  No  PAS Confirmation Code: NA  Patient/family educated on Medicare website which has current facility and service quality ratings: yes  Education Provided on the Discharge Plan: Yes  Persons Notified of Discharge Plans: Patient  Patient/Family in Agreement with the Plan: yes    Handoff Referral Completed: Yes    Additional Information:  RNCC was notified by primary team that pt will be medically ready for discharge today. RNCC faxed home care orders to [facility]. Family will be providing discharge transportation. No other discharge needs.    Dre Shin RN BSN  5A RN Care Coordinator   Ph: [phone]  Pager: [phone]

## 2024-07-17 NOTE — PLAN OF CARE
Goal Outcome Evaluation:      Plan of Care Reviewed With: patient    Overall Patient Progress: improvingOverall Patient Progress: improving     Patient is discharging today to home with family. Originally patient was going to be picked up by her sister this morning, however, her sister drives a large truck and per PT patient would not be able to get in and out of that vehicle. Patients cousin is not coming to  patient and will chat with PT before she goes. She will be here after work, some time around 3 pm. Patient denies pain, VSS, and will be going home with all drains.

## 2024-07-17 NOTE — PLAN OF CARE
Goal Outcome Evaluation:                      Nursing note    Pain/Comfort: Patient having acute abdominal and chronic back pain 7/10 - 9/10 treated with therapeutic touch, massage and PRN oxycodone 5mg oral tablet x1    Primary problem: Post op hysterectomy  Assessments/Progress: Patient is A&Ox4, making needs known. HR bradycardic, otherwise VSS. Catheter cares done. Patient's suprapubic catheter having copious serous drainage from incision site. I did dressing change this morning, with teaching patient by verbal and visual demonstration. I gave patient drain 2x2 gauzes and adhesive to bring home for dressing changes.     Priority nursing care for next shift: plan of care  Discharge plan/ barriers to discharge: pending

## 2024-07-17 NOTE — PROGRESS NOTES
07/17/24 1000   Appointment Info   Signing Clinician's Name / Credentials (PT) NUVIA Mac   Student Supervision Direct Patient Contact Provided;Therapy services provided with the co-signing licensed therapist guiding and directing the services, and providing the skilled judgement and assessment throughout the session   Living Environment   People in Home grandchild(vernell);child(vernell), adult   Current Living Arrangements house   Home Accessibility stairs to enter home   Number of Stairs, Main Entrance 2;5   Stair Railings, Main Entrance none;railings on both sides of stairs   Transportation Anticipated family or friend will provide   Living Environment Comments Pt reports living in house with many adult family members who assist her. Has shower chair. Pt has 5 steps with B railings to enter through the front door, 2 steps without a railing to enter the backdoor.   Self-Care   Usual Activity Tolerance moderate   Current Activity Tolerance fair   Regular Exercise No   Equipment Currently Used at Home cane, straight;walker, standard;shower chair   Activity/Exercise/Self-Care Comment Pt reports receives assist for  bathing, occasionally receives assist for LB dressing. Reports family can assist at discharge.   General Information   Onset of Illness/Injury or Date of Surgery 07/12/24   Referring Physician Dany Perez MD   Patient/Family Therapy Goals Statement (PT) Return home   Pertinent History of Current Problem (include personal factors and/or comorbidities that impact the POC) Pt is a 69 year old who is POD#5 s/p s/p dsc lsc> SNEHA, LSO, omental biopsy, cystotomy repair with urology, suprapubic bladder insertion, omental flap. Plan for discharge to home today.   Existing Precautions/Restrictions abdominal;fall   Cognition   Affect/Mental Status (Cognition) WFL   Orientation Status (Cognition) oriented x 3  (Disoriented to place)   Follows Commands (Cognition) WFL  (Needs reptetive encouragement to respond.  "Gave nonverbal responses initially, eventually was able to verbally respond)   Pain Assessment   Patient Currently in Pain Yes, see Vital Sign flowsheet   Integumentary/Edema   Integumentary/Edema Comments Bilateral LE edema, has compression socks at home and doesn't wear them \"even through she should\"   Posture    Posture Forward head position;Protracted shoulders  (Flexed posture)   Range of Motion (ROM)   ROM Comment Not formally assessed   Strength (Manual Muscle Testing)   Strength Comments Not formally assessed, grossly >3/5 per observation of functional mobility   Bed Mobility   Comment, (Bed Mobility) ModA x1 with Sit EOB>sidelying and sidelying >supine   Transfers   Comment, (Transfers) Sit>stand CGA with FWW   Gait/Stairs (Locomotion)   Comment, (Gait/Stairs) Ambulated 5' with CGA and FWW, flexed trunk, short step length, decreased speed, bilateral compensated trendelenberg gait   Balance   Balance Comments Good seated balance, impaired standing and dynamic balance   Clinical Impression   Criteria for Skilled Therapeutic Intervention Yes, treatment indicated   PT Diagnosis (PT) Impaired functional mobility   Influenced by the following impairments Pain, impaired balance, strength, activity tolerance   Functional limitations due to impairments Bed mobility, transfers, gait, stairs   Clinical Presentation (PT Evaluation Complexity) stable   Clinical Presentation Rationale Clinical judgement   Clinical Decision Making (Complexity) low complexity   Planned Therapy Interventions (PT) balance training;bed mobility training;gait training;home exercise program;neuromuscular re-education;patient/family education;stair training;strengthening;transfer training;progressive activity/exercise;risk factor education;home program guidelines   Risk & Benefits of therapy have been explained evaluation/treatment results reviewed;care plan/treatment goals reviewed;risks/benefits reviewed;patient   PT Total Evaluation Time   PT " [place], Low Complexity Minutes (18523) 10   Physical Therapy Goals   PT Frequency 6x/week   PT Predicted Duration/Target Date for Goal Attainment 07/17/24   PT Goals Bed Mobility;Transfers;Gait;Stairs   PT: Bed Mobility Independent;Within precautions   PT: Transfers Modified independent;Within precautions   PT: Gait Modified independent;Standard walker;Within precautions;150 feet   PT: Stairs Modified independent;Within precautions;5 stairs;Rail on both sides   PT Discharge Planning   PT Plan Caregiver training with: Bed mobility, transfers, stairs   PT Discharge Recommendation (DC Rec) Transitional Care Facility   PT Rationale for DC Rec Pt is presenting below baseline 2/2 recent hospital admission. Current d/c recommendation is TCU to address significant deficits in functional mobility. If pt were to d/c home today, pt would need 24/7 assist and Ax2 on stairs and Ax1 fo bed mobility.   PT Brief overview of current status Ax2 stairs, Ax1 bed mobility and gait   Total Session Time   Timed Code Treatment Minutes 45   Total Session Time (sum of timed and untimed services) 55

## 2024-07-18 ENCOUNTER — PATIENT OUTREACH (OUTPATIENT)
Dept: CARE COORDINATION | Facility: CLINIC | Age: 69
End: 2024-07-18
Payer: COMMERCIAL

## 2024-07-18 ENCOUNTER — PATIENT OUTREACH (OUTPATIENT)
Dept: ONCOLOGY | Facility: CLINIC | Age: 69
End: 2024-07-18
Payer: COMMERCIAL

## 2024-07-18 NOTE — PLAN OF CARE
Occupational Therapy Discharge Summary    Reason for therapy discharge:    Discharged to home with home therapy.    Progress towards therapy goal(s). See goals on Care Plan in [facility] electronic health record for goal details.  Goals partially met.  Barriers to achieving goals:   limited tolerance for therapy.    Therapy recommendation(s):    Continued therapy is recommended.  Rationale/Recommendations:  Strongly recommending TCU stay at discharge to progress pt IND/safety with mobility and ADLs. Pt preference for home discharge, therefore recommending Ax1 w/FWW for mobility and ADLs, and sponge bathing until pt able to work with  therapies on tub transfer.

## 2024-07-18 NOTE — PROGRESS NOTES
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 Alis Hartman was discharged from the hospital.  - Follow up with  Dr. Perez on 7/30/24.  - Follow up with PCP (Dr. Eckert) next week. Gretchen (scheduling) will call her on 7/17 to finalize appointment for next week. Dr. Eckert aware.    - Follow up with Urology in 2-3 weeks (scheduled on 8/7/24).  - Follow up with cardiology on 8/20/24.     Notes:  Telephone call from writer not indicated at this time, due to the following reason(s):  Patient's post-hospital telephone call was already completed earlier today, and patient reported no concerns - see chart notes for details.      Confirmed patient is scheduled for a post-op appointment with Dr. Perez on 7/30/24.    Future Appointments   Date Time Provider Department Center   7/30/2024  1:30 PM Dany Perez MD [facility]   8/7/2024  8:45 AM [facility]   8/20/2024  9:30 AM Robin Herrera MD [facility]     Nicholas Jarrell, RN, BSN, OCN  RN Care Coordinator - Oncology  [facility]

## 2024-07-18 NOTE — PROGRESS NOTES
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 (Red Flag symptoms escalate to triage hotline per guidelines): Improved  Do you know how to contact your clinic care team if you have future questions or changes to your health status? : Yes  Does the patient have their discharge instructions? : Yes  Does the patient have questions regarding their discharge instructions? : No  Were you started on any new medications or were there changes to any of your previous medications? : Yes  Does the patient have all of their medications?: Yes  Do you have questions regarding any of your medications? : No  Do you have all of your needed medical supplies or equipment (DME)?  (i.e. oxygen tank, CPAP, cane, etc.): Yes    Post-op ([facility] CTA Only)  If the patient had a surgery or procedure, do they have any questions for a nurse?: No         Follow up Plan     GENERAL POST-OPERATIVE PATIENT INSTRUCTIONS FOLLOW-UP:  If you have any of the following, call the gynecology oncology office (if  the clinic is closed, you can call the hospital at [phone] and ask for  the GYN Oncology resident on call):  - Temperature greater than 100.4  - Persistent nausea and vomiting  - Severe uncontrolled pain  -  Redness, tenderness, or signs of infection (pain, swelling, redness, odor  or green/yellow discharge around the site)  - Difficulty breathing, headache or visual disturbances  - Hives  - Persistent dizziness or light-headedness  - Extreme fatigue  In an emergency, call 911 or go to an Emergency Department at a  nearby hospital  QUESTIONS: Please feel free to call your physician or the hospital   if you have any questions, and they will be glad to assist you.  Phone numbers to gynecologic oncology clinics:  [facility]: [phone]  [facility]: [phone]  [facility]: [phone]  [facility]: [phone]  [facility]: [phone]  [facility]:   [facility]: [phone]    Future Appointments   Date Time Provider Department Center   7/30/2024  1:30 PM Dany Perez MD [facility]   8/7/2024  8:45 AM Black Monroy MD [facility]   8/20/2024  9:30 AM Robin Herrera MD [facility]     Outpatient Plan as outlined on AVS reviewed with patient.    For any urgent concerns, please contact our 24 hour nurse triage line: [phone]     KERMIT Alejandra  [phone]  [facility]

## 2024-07-18 NOTE — PROGRESS NOTES
Physical Therapy Discharge Summary    Reason for therapy discharge:    Discharged to home with home therapy.    Progress towards therapy goal(s). See goals on Care Plan in [facility] electronic health record for goal details.  Goals not met.  Barriers to achieving goals:   discharge from facility.    Therapy recommendation(s):    Continued therapy is recommended.  Rationale/Recommendations:  Pt is presenting below baseline 2/2 recent hospital admission. Current d/c recommendation is TCU to address significant deficits in functional mobility. If pt were to d/c home today, pt would need 24/7 assist and Ax2 on stairs and Ax1 fo bed mobility..

## 2024-07-21 ENCOUNTER — HOSPITAL ENCOUNTER (INPATIENT)
Facility: CLINIC | Age: 69
LOS: 5 days | Discharge: NURSING, REHAB OR LONG-TERM CARE | DRG: 863 | End: 2024-07-26
Attending: EMERGENCY MEDICINE | Admitting: OBSTETRICS & GYNECOLOGY
Payer: COMMERCIAL

## 2024-07-21 ENCOUNTER — APPOINTMENT (OUTPATIENT)
Dept: CT IMAGING | Facility: CLINIC | Age: 69
DRG: 863 | End: 2024-07-21
Attending: PHYSICIAN ASSISTANT
Payer: COMMERCIAL

## 2024-07-21 ENCOUNTER — TELEPHONE (OUTPATIENT)
Dept: ONCOLOGY | Facility: CLINIC | Age: 69
End: 2024-07-21
Payer: COMMERCIAL

## 2024-07-21 ENCOUNTER — NURSE TRIAGE (OUTPATIENT)
Dept: NURSING | Facility: CLINIC | Age: 69
End: 2024-07-21
Payer: COMMERCIAL

## 2024-07-21 DIAGNOSIS — R10.30 LOWER ABDOMINAL PAIN: ICD-10-CM

## 2024-07-21 DIAGNOSIS — N30.00 ACUTE CYSTITIS WITHOUT HEMATURIA: ICD-10-CM

## 2024-07-21 DIAGNOSIS — T14.8XXA OPEN WOUND: Primary | ICD-10-CM

## 2024-07-21 DIAGNOSIS — Z90.710 S/P HYSTERECTOMY: ICD-10-CM

## 2024-07-21 LAB
ALBUMIN SERPL BCG-MCNC: 2.5 G/DL (ref 3.5–5.2)
ALBUMIN UR-MCNC: 100 MG/DL
ALBUMIN UR-MCNC: 100 MG/DL
ALP SERPL-CCNC: 66 U/L (ref 40–150)
ALT SERPL W P-5'-P-CCNC: 5 U/L (ref 0–50)
ANION GAP SERPL CALCULATED.3IONS-SCNC: 10 MMOL/L (ref 7–15)
APPEARANCE UR: ABNORMAL
APPEARANCE UR: ABNORMAL
AST SERPL W P-5'-P-CCNC: 12 U/L (ref 0–45)
ATRIAL RATE - MUSE: 69 BPM
BACTERIA #/AREA URNS HPF: ABNORMAL /HPF
BACTERIA #/AREA URNS HPF: ABNORMAL /HPF
BASOPHILS # BLD AUTO: 0 10E3/UL (ref 0–0.2)
BASOPHILS NFR BLD AUTO: 0 %
BILIRUB SERPL-MCNC: 0.4 MG/DL
BILIRUB UR QL STRIP: NEGATIVE
BILIRUB UR QL STRIP: NEGATIVE
BUN SERPL-MCNC: 15.7 MG/DL (ref 8–23)
CALCIUM SERPL-MCNC: 8.3 MG/DL (ref 8.8–10.4)
CHLORIDE SERPL-SCNC: 100 MMOL/L (ref 98–107)
COLOR UR AUTO: ABNORMAL
COLOR UR AUTO: ABNORMAL
CREAT SERPL-MCNC: 0.86 MG/DL (ref 0.51–0.95)
DIASTOLIC BLOOD PRESSURE - MUSE: NORMAL MMHG
EGFRCR SERPLBLD CKD-EPI 2021: 72 ML/MIN/1.73M2
EOSINOPHIL # BLD AUTO: 0 10E3/UL (ref 0–0.7)
EOSINOPHIL NFR BLD AUTO: 0 %
ERYTHROCYTE [DISTWIDTH] IN BLOOD BY AUTOMATED COUNT: 23.9 % (ref 10–15)
GLUCOSE BLDC GLUCOMTR-MCNC: 86 MG/DL (ref 70–99)
GLUCOSE SERPL-MCNC: 105 MG/DL (ref 70–99)
GLUCOSE UR STRIP-MCNC: NEGATIVE MG/DL
GLUCOSE UR STRIP-MCNC: NEGATIVE MG/DL
HCO3 SERPL-SCNC: 29 MMOL/L (ref 22–29)
HCT VFR BLD AUTO: 27.8 % (ref 35–47)
HGB BLD-MCNC: 8.5 G/DL (ref 11.7–15.7)
HGB UR QL STRIP: ABNORMAL
HGB UR QL STRIP: ABNORMAL
HOLD SPECIMEN: NORMAL
IMM GRANULOCYTES # BLD: 0.1 10E3/UL
IMM GRANULOCYTES NFR BLD: 1 %
INTERPRETATION ECG - MUSE: NORMAL
KETONES UR STRIP-MCNC: 10 MG/DL
KETONES UR STRIP-MCNC: 10 MG/DL
LEUKOCYTE ESTERASE UR QL STRIP: ABNORMAL
LEUKOCYTE ESTERASE UR QL STRIP: ABNORMAL
LIPASE SERPL-CCNC: 53 U/L (ref 13–60)
LYMPHOCYTES # BLD AUTO: 0.8 10E3/UL (ref 0.8–5.3)
LYMPHOCYTES NFR BLD AUTO: 6 %
MCH RBC QN AUTO: 23.4 PG (ref 26.5–33)
MCHC RBC AUTO-ENTMCNC: 30.6 G/DL (ref 31.5–36.5)
MCV RBC AUTO: 76 FL (ref 78–100)
MONOCYTES # BLD AUTO: 0.8 10E3/UL (ref 0–1.3)
MONOCYTES NFR BLD AUTO: 6 %
NEUTROPHILS # BLD AUTO: 11 10E3/UL (ref 1.6–8.3)
NEUTROPHILS NFR BLD AUTO: 87 %
NITRATE UR QL: POSITIVE
NITRATE UR QL: POSITIVE
NRBC # BLD AUTO: 0 10E3/UL
NRBC BLD AUTO-RTO: 0 /100
P AXIS - MUSE: 37 DEGREES
PH UR STRIP: 6.5 [PH] (ref 5–7)
PH UR STRIP: 6.5 [PH] (ref 5–7)
PLATELET # BLD AUTO: 474 10E3/UL (ref 150–450)
POTASSIUM SERPL-SCNC: 3.7 MMOL/L (ref 3.4–5.3)
PR INTERVAL - MUSE: 138 MS
PROT SERPL-MCNC: 6 G/DL (ref 6.4–8.3)
QRS DURATION - MUSE: 72 MS
QT - MUSE: 400 MS
QTC - MUSE: 428 MS
R AXIS - MUSE: -14 DEGREES
RBC # BLD AUTO: 3.64 10E6/UL (ref 3.8–5.2)
RBC URINE: 84 /HPF
RBC URINE: 84 /HPF
SODIUM SERPL-SCNC: 139 MMOL/L (ref 135–145)
SP GR UR STRIP: 1.01 (ref 1–1.03)
SP GR UR STRIP: 1.01 (ref 1–1.03)
SQUAMOUS EPITHELIAL: 3 /HPF
SQUAMOUS EPITHELIAL: 8 /HPF
SYSTOLIC BLOOD PRESSURE - MUSE: NORMAL MMHG
T AXIS - MUSE: -7 DEGREES
UROBILINOGEN UR STRIP-MCNC: NORMAL MG/DL
UROBILINOGEN UR STRIP-MCNC: NORMAL MG/DL
VENTRICULAR RATE- MUSE: 69 BPM
WBC # BLD AUTO: 12.7 10E3/UL (ref 4–11)
WBC CLUMPS #/AREA URNS HPF: PRESENT /HPF
WBC CLUMPS #/AREA URNS HPF: PRESENT /HPF
WBC URINE: >182 /HPF
WBC URINE: >182 /HPF

## 2024-07-21 PROCEDURE — 99285 EMERGENCY DEPT VISIT HI MDM: CPT | Performed by: EMERGENCY MEDICINE

## 2024-07-21 PROCEDURE — 120N000002 HC R&B MED SURG/OB UMMC

## 2024-07-21 PROCEDURE — 80053 COMPREHEN METABOLIC PANEL: CPT | Performed by: PHYSICIAN ASSISTANT

## 2024-07-21 PROCEDURE — 93005 ELECTROCARDIOGRAM TRACING: CPT | Performed by: EMERGENCY MEDICINE

## 2024-07-21 PROCEDURE — 96375 TX/PRO/DX INJ NEW DRUG ADDON: CPT | Performed by: EMERGENCY MEDICINE

## 2024-07-21 PROCEDURE — 74177 CT ABD & PELVIS W/CONTRAST: CPT

## 2024-07-21 PROCEDURE — 250N000011 HC RX IP 250 OP 636: Performed by: PHYSICIAN ASSISTANT

## 2024-07-21 PROCEDURE — 87076 CULTURE ANAEROBE IDENT EACH: CPT | Performed by: OBSTETRICS & GYNECOLOGY

## 2024-07-21 PROCEDURE — 93010 ELECTROCARDIOGRAM REPORT: CPT | Performed by: EMERGENCY MEDICINE

## 2024-07-21 PROCEDURE — 96361 HYDRATE IV INFUSION ADD-ON: CPT | Performed by: EMERGENCY MEDICINE

## 2024-07-21 PROCEDURE — 258N000003 HC RX IP 258 OP 636: Performed by: PHYSICIAN ASSISTANT

## 2024-07-21 PROCEDURE — 87075 CULTR BACTERIA EXCEPT BLOOD: CPT | Performed by: OBSTETRICS & GYNECOLOGY

## 2024-07-21 PROCEDURE — 81001 URINALYSIS AUTO W/SCOPE: CPT | Performed by: PHYSICIAN ASSISTANT

## 2024-07-21 PROCEDURE — 74177 CT ABD & PELVIS W/CONTRAST: CPT | Mod: 26 | Performed by: RADIOLOGY

## 2024-07-21 PROCEDURE — 85025 COMPLETE CBC W/AUTO DIFF WBC: CPT | Performed by: PHYSICIAN ASSISTANT

## 2024-07-21 PROCEDURE — 99285 EMERGENCY DEPT VISIT HI MDM: CPT | Mod: 25 | Performed by: EMERGENCY MEDICINE

## 2024-07-21 PROCEDURE — 96374 THER/PROPH/DIAG INJ IV PUSH: CPT | Mod: 59 | Performed by: EMERGENCY MEDICINE

## 2024-07-21 PROCEDURE — 250N000011 HC RX IP 250 OP 636: Performed by: OBSTETRICS & GYNECOLOGY

## 2024-07-21 PROCEDURE — 250N000011 HC RX IP 250 OP 636: Performed by: EMERGENCY MEDICINE

## 2024-07-21 PROCEDURE — 258N000003 HC RX IP 258 OP 636: Performed by: OBSTETRICS & GYNECOLOGY

## 2024-07-21 PROCEDURE — 83690 ASSAY OF LIPASE: CPT | Performed by: PHYSICIAN ASSISTANT

## 2024-07-21 PROCEDURE — 87186 SC STD MICRODIL/AGAR DIL: CPT | Performed by: PHYSICIAN ASSISTANT

## 2024-07-21 PROCEDURE — 87205 SMEAR GRAM STAIN: CPT | Performed by: OBSTETRICS & GYNECOLOGY

## 2024-07-21 PROCEDURE — 36415 COLL VENOUS BLD VENIPUNCTURE: CPT | Performed by: PHYSICIAN ASSISTANT

## 2024-07-21 PROCEDURE — 87086 URINE CULTURE/COLONY COUNT: CPT | Performed by: PHYSICIAN ASSISTANT

## 2024-07-21 RX ORDER — NALOXONE HYDROCHLORIDE 0.4 MG/ML
0.2 INJECTION, SOLUTION INTRAMUSCULAR; INTRAVENOUS; SUBCUTANEOUS
Status: DISCONTINUED | OUTPATIENT
Start: 2024-07-21 | End: 2024-07-26 | Stop reason: HOSPADM

## 2024-07-21 RX ORDER — HYDROCORTISONE 2.5 %
CREAM (GRAM) TOPICAL PRN
COMMUNITY
Start: 2024-07-02

## 2024-07-21 RX ORDER — OXYBUTYNIN CHLORIDE 5 MG/1
5 TABLET ORAL 3 TIMES DAILY
Status: DISCONTINUED | OUTPATIENT
Start: 2024-07-21 | End: 2024-07-26 | Stop reason: HOSPADM

## 2024-07-21 RX ORDER — PHENAZOPYRIDINE HYDROCHLORIDE 100 MG/1
100 TABLET, FILM COATED ORAL 3 TIMES DAILY PRN
Status: DISCONTINUED | OUTPATIENT
Start: 2024-07-21 | End: 2024-07-26 | Stop reason: HOSPADM

## 2024-07-21 RX ORDER — ALBUTEROL SULFATE 90 UG/1
1-2 AEROSOL, METERED RESPIRATORY (INHALATION) EVERY 4 HOURS PRN
Status: DISCONTINUED | OUTPATIENT
Start: 2024-07-21 | End: 2024-07-26 | Stop reason: HOSPADM

## 2024-07-21 RX ORDER — POLYETHYLENE GLYCOL 3350 17 G/17G
17 POWDER, FOR SOLUTION ORAL DAILY
Status: DISCONTINUED | OUTPATIENT
Start: 2024-07-22 | End: 2024-07-21

## 2024-07-21 RX ORDER — BENZTROPINE MESYLATE 1 MG/1
1 TABLET ORAL DAILY
Status: DISCONTINUED | OUTPATIENT
Start: 2024-07-22 | End: 2024-07-26 | Stop reason: HOSPADM

## 2024-07-21 RX ORDER — ONDANSETRON 2 MG/ML
4 INJECTION INTRAMUSCULAR; INTRAVENOUS EVERY 6 HOURS PRN
Status: DISCONTINUED | OUTPATIENT
Start: 2024-07-21 | End: 2024-07-26 | Stop reason: HOSPADM

## 2024-07-21 RX ORDER — HYDROMORPHONE HCL IN WATER/PF 6 MG/30 ML
0.2 PATIENT CONTROLLED ANALGESIA SYRINGE INTRAVENOUS
Status: DISCONTINUED | OUTPATIENT
Start: 2024-07-21 | End: 2024-07-25

## 2024-07-21 RX ORDER — ACETAMINOPHEN 325 MG/1
975 TABLET ORAL EVERY 6 HOURS
Status: DISCONTINUED | OUTPATIENT
Start: 2024-07-21 | End: 2024-07-21

## 2024-07-21 RX ORDER — ERTAPENEM 1 G/1
1 INJECTION, POWDER, LYOPHILIZED, FOR SOLUTION INTRAMUSCULAR; INTRAVENOUS EVERY 24 HOURS
Status: DISCONTINUED | OUTPATIENT
Start: 2024-07-22 | End: 2024-07-24

## 2024-07-21 RX ORDER — OXYCODONE HYDROCHLORIDE 5 MG/1
5 TABLET ORAL EVERY 4 HOURS PRN
Status: DISCONTINUED | OUTPATIENT
Start: 2024-07-21 | End: 2024-07-26 | Stop reason: HOSPADM

## 2024-07-21 RX ORDER — HYDROMORPHONE HCL IN WATER/PF 6 MG/30 ML
0.4 PATIENT CONTROLLED ANALGESIA SYRINGE INTRAVENOUS
Status: DISCONTINUED | OUTPATIENT
Start: 2024-07-21 | End: 2024-07-21

## 2024-07-21 RX ORDER — ATENOLOL 50 MG/1
100 TABLET ORAL DAILY
Status: DISCONTINUED | OUTPATIENT
Start: 2024-07-22 | End: 2024-07-26 | Stop reason: HOSPADM

## 2024-07-21 RX ORDER — ONDANSETRON 2 MG/ML
4 INJECTION INTRAMUSCULAR; INTRAVENOUS ONCE
Status: COMPLETED | OUTPATIENT
Start: 2024-07-21 | End: 2024-07-21

## 2024-07-21 RX ORDER — HYDROMORPHONE HYDROCHLORIDE 1 MG/ML
0.5 INJECTION, SOLUTION INTRAMUSCULAR; INTRAVENOUS; SUBCUTANEOUS ONCE
Status: COMPLETED | OUTPATIENT
Start: 2024-07-21 | End: 2024-07-21

## 2024-07-21 RX ORDER — HYDRALAZINE HYDROCHLORIDE 20 MG/ML
10 INJECTION INTRAMUSCULAR; INTRAVENOUS EVERY 4 HOURS PRN
Status: DISCONTINUED | OUTPATIENT
Start: 2024-07-21 | End: 2024-07-26 | Stop reason: HOSPADM

## 2024-07-21 RX ORDER — FUROSEMIDE 40 MG
40 TABLET ORAL DAILY
Status: ON HOLD | COMMUNITY
Start: 2024-07-19 | End: 2024-08-09

## 2024-07-21 RX ORDER — LIDOCAINE 40 MG/G
CREAM TOPICAL
Status: DISCONTINUED | OUTPATIENT
Start: 2024-07-21 | End: 2024-07-26 | Stop reason: HOSPADM

## 2024-07-21 RX ORDER — PROCHLORPERAZINE MALEATE 5 MG
5 TABLET ORAL EVERY 6 HOURS PRN
Status: DISCONTINUED | OUTPATIENT
Start: 2024-07-21 | End: 2024-07-26 | Stop reason: HOSPADM

## 2024-07-21 RX ORDER — DEXTROSE MONOHYDRATE 25 G/50ML
25-50 INJECTION, SOLUTION INTRAVENOUS
Status: DISCONTINUED | OUTPATIENT
Start: 2024-07-21 | End: 2024-07-26 | Stop reason: HOSPADM

## 2024-07-21 RX ORDER — CEFTRIAXONE 1 G/1
1 INJECTION, POWDER, FOR SOLUTION INTRAMUSCULAR; INTRAVENOUS ONCE
Status: DISCONTINUED | OUTPATIENT
Start: 2024-07-21 | End: 2024-07-21

## 2024-07-21 RX ORDER — METHOCARBAMOL 500 MG/1
500 TABLET, FILM COATED ORAL EVERY 6 HOURS PRN
Status: DISCONTINUED | OUTPATIENT
Start: 2024-07-21 | End: 2024-07-26 | Stop reason: HOSPADM

## 2024-07-21 RX ORDER — FUROSEMIDE 40 MG
40 TABLET ORAL DAILY
Status: DISCONTINUED | OUTPATIENT
Start: 2024-07-22 | End: 2024-07-26 | Stop reason: HOSPADM

## 2024-07-21 RX ORDER — GABAPENTIN 800 MG/1
2 TABLET ORAL 2 TIMES DAILY
Status: ON HOLD | COMMUNITY
Start: 2024-07-16 | End: 2024-08-09

## 2024-07-21 RX ORDER — CALCIUM CARBONATE 500 MG/1
1000 TABLET, CHEWABLE ORAL 4 TIMES DAILY PRN
Status: DISCONTINUED | OUTPATIENT
Start: 2024-07-21 | End: 2024-07-26 | Stop reason: HOSPADM

## 2024-07-21 RX ORDER — NALOXONE HYDROCHLORIDE 0.4 MG/ML
0.4 INJECTION, SOLUTION INTRAMUSCULAR; INTRAVENOUS; SUBCUTANEOUS
Status: DISCONTINUED | OUTPATIENT
Start: 2024-07-21 | End: 2024-07-26 | Stop reason: HOSPADM

## 2024-07-21 RX ORDER — ERTAPENEM 1 G/1
1 INJECTION, POWDER, LYOPHILIZED, FOR SOLUTION INTRAMUSCULAR; INTRAVENOUS ONCE
Status: COMPLETED | OUTPATIENT
Start: 2024-07-21 | End: 2024-07-21

## 2024-07-21 RX ORDER — CEFTRIAXONE 1 G/1
1 INJECTION, POWDER, FOR SOLUTION INTRAMUSCULAR; INTRAVENOUS EVERY 24 HOURS
Status: DISCONTINUED | OUTPATIENT
Start: 2024-07-21 | End: 2024-07-21

## 2024-07-21 RX ORDER — CYANOCOBALAMIN 1000 UG/ML
INJECTION, SOLUTION INTRAMUSCULAR; SUBCUTANEOUS
COMMUNITY
Start: 2024-06-11

## 2024-07-21 RX ORDER — SODIUM CHLORIDE 9 MG/ML
INJECTION, SOLUTION INTRAVENOUS CONTINUOUS
Status: DISCONTINUED | OUTPATIENT
Start: 2024-07-21 | End: 2024-07-22

## 2024-07-21 RX ORDER — IOPAMIDOL 755 MG/ML
109 INJECTION, SOLUTION INTRAVASCULAR ONCE
Status: COMPLETED | OUTPATIENT
Start: 2024-07-21 | End: 2024-07-21

## 2024-07-21 RX ORDER — BISACODYL 10 MG
10 SUPPOSITORY, RECTAL RECTAL DAILY PRN
Status: DISCONTINUED | OUTPATIENT
Start: 2024-07-21 | End: 2024-07-26 | Stop reason: HOSPADM

## 2024-07-21 RX ORDER — ONDANSETRON 4 MG/1
4 TABLET, ORALLY DISINTEGRATING ORAL EVERY 6 HOURS PRN
Status: DISCONTINUED | OUTPATIENT
Start: 2024-07-21 | End: 2024-07-26 | Stop reason: HOSPADM

## 2024-07-21 RX ORDER — HYDRALAZINE HYDROCHLORIDE 10 MG/1
10 TABLET, FILM COATED ORAL EVERY 4 HOURS PRN
Status: DISCONTINUED | OUTPATIENT
Start: 2024-07-21 | End: 2024-07-26 | Stop reason: HOSPADM

## 2024-07-21 RX ORDER — NICOTINE POLACRILEX 4 MG
15-30 LOZENGE BUCCAL
Status: DISCONTINUED | OUTPATIENT
Start: 2024-07-21 | End: 2024-07-26 | Stop reason: HOSPADM

## 2024-07-21 RX ORDER — PROCHLORPERAZINE 25 MG
12.5 SUPPOSITORY, RECTAL RECTAL EVERY 12 HOURS PRN
Status: DISCONTINUED | OUTPATIENT
Start: 2024-07-21 | End: 2024-07-26 | Stop reason: HOSPADM

## 2024-07-21 RX ORDER — ACETAMINOPHEN 325 MG/1
650 TABLET ORAL 4 TIMES DAILY
Status: DISCONTINUED | OUTPATIENT
Start: 2024-07-21 | End: 2024-07-26 | Stop reason: HOSPADM

## 2024-07-21 RX ORDER — ZOLPIDEM TARTRATE 5 MG/1
10 TABLET ORAL
Status: DISCONTINUED | OUTPATIENT
Start: 2024-07-21 | End: 2024-07-26 | Stop reason: HOSPADM

## 2024-07-21 RX ORDER — AMOXICILLIN 250 MG
2 CAPSULE ORAL 2 TIMES DAILY PRN
Status: DISCONTINUED | OUTPATIENT
Start: 2024-07-21 | End: 2024-07-26 | Stop reason: HOSPADM

## 2024-07-21 RX ORDER — AMOXICILLIN 250 MG
1 CAPSULE ORAL 2 TIMES DAILY PRN
Status: DISCONTINUED | OUTPATIENT
Start: 2024-07-21 | End: 2024-07-26 | Stop reason: HOSPADM

## 2024-07-21 RX ORDER — GABAPENTIN 800 MG/1
1600 TABLET ORAL 2 TIMES DAILY
Status: DISCONTINUED | OUTPATIENT
Start: 2024-07-21 | End: 2024-07-26 | Stop reason: HOSPADM

## 2024-07-21 RX ORDER — QUETIAPINE FUMARATE 400 MG/1
400 TABLET, FILM COATED ORAL AT BEDTIME
Status: DISCONTINUED | OUTPATIENT
Start: 2024-07-21 | End: 2024-07-26 | Stop reason: HOSPADM

## 2024-07-21 RX ORDER — MAGNESIUM HYDROXIDE/ALUMINUM HYDROXICE/SIMETHICONE 120; 1200; 1200 MG/30ML; MG/30ML; MG/30ML
30 SUSPENSION ORAL EVERY 4 HOURS PRN
Status: DISCONTINUED | OUTPATIENT
Start: 2024-07-21 | End: 2024-07-26 | Stop reason: HOSPADM

## 2024-07-21 RX ADMIN — HYDROMORPHONE HYDROCHLORIDE 0.5 MG: 1 INJECTION, SOLUTION INTRAMUSCULAR; INTRAVENOUS; SUBCUTANEOUS at 21:31

## 2024-07-21 RX ADMIN — ERTAPENEM SODIUM 1 G: 1 INJECTION, POWDER, LYOPHILIZED, FOR SOLUTION INTRAMUSCULAR; INTRAVENOUS at 20:57

## 2024-07-21 RX ADMIN — HYDROMORPHONE HYDROCHLORIDE 0.5 MG: 1 INJECTION, SOLUTION INTRAMUSCULAR; INTRAVENOUS; SUBCUTANEOUS at 15:38

## 2024-07-21 RX ADMIN — IOPAMIDOL 109 ML: 755 INJECTION, SOLUTION INTRAVENOUS at 18:16

## 2024-07-21 RX ADMIN — ONDANSETRON 4 MG: 2 INJECTION INTRAMUSCULAR; INTRAVENOUS at 15:38

## 2024-07-21 RX ADMIN — ONDANSETRON 4 MG: 2 INJECTION INTRAMUSCULAR; INTRAVENOUS at 21:37

## 2024-07-21 RX ADMIN — SODIUM CHLORIDE: 9 INJECTION, SOLUTION INTRAVENOUS at 22:58

## 2024-07-21 RX ADMIN — SODIUM CHLORIDE, POTASSIUM CHLORIDE, SODIUM LACTATE AND CALCIUM CHLORIDE 1000 ML: 600; 310; 30; 20 INJECTION, SOLUTION INTRAVENOUS at 15:39

## 2024-07-21 ASSESSMENT — ACTIVITIES OF DAILY LIVING (ADL)
ADLS_ACUITY_SCORE: 38

## 2024-07-21 ASSESSMENT — COLUMBIA-SUICIDE SEVERITY RATING SCALE - C-SSRS
2. HAVE YOU ACTUALLY HAD ANY THOUGHTS OF KILLING YOURSELF IN THE PAST MONTH?: NO
1. IN THE PAST MONTH, HAVE YOU WISHED YOU WERE DEAD OR WISHED YOU COULD GO TO SLEEP AND NOT WAKE UP?: NO
6. HAVE YOU EVER DONE ANYTHING, STARTED TO DO ANYTHING, OR PREPARED TO DO ANYTHING TO END YOUR LIFE?: NO

## 2024-07-21 NOTE — LETTER
Transition Communication Hand-off for Care Transitions to Next Level of Care Provider    Name: Alis Hartman  : 1955  MRN #: [id number]  Primary Care Provider: Maria Fernanda Eckert     Primary Clinic: [address]  [place] [address]     Reason for Hospitalization:  Lower abdominal pain [R10.30]  Acute cystitis without hematuria [N30.00]  Admit Date/Time: 2024  3:02 PM  Discharge Date: 24  Payor Source: Payor: [facility] / Plan: [facility] DUAL / Product Type: HMO /     Readmission Assessment Measure (LISA) Risk Score/category: 45%    Reason for Communication Hand-off Referral: Avoidable readmission within 30 days    Discharge Plan:       Concern for non-adherence with plan of care:   Y/N no  Discharge Needs Assessment:  Needs      Flowsheet Row Most Recent Value   Equipment Currently Used at Home cane, straight, walker, standard, shower chair   # of Referrals Placed by CM External Care Coordination            Follow-up specialty is recommended: Yes    Follow-up plan:    Future Appointments   Date Time Provider Department Center   2024  9:30 PM Anita Arrington, PT [facility] O   2024 10:15 AM Wendy Real, OT [facility] O   2024  1:30 PM Dany Perez MD [facility]   2024  8:45 AM Black Monroy MD [facility]   2024  9:30 AM Robin Herrera MD [facility]       Any outstanding tests or procedures:        Referrals       Future Labs/Procedures    Occupational Therapy Adult Consult     Comments:    Evaluate and treat as clinically indicated.    Reason:  Weakness    Physical Therapy Adult Consult     Comments:    Evaluate and treat as clinically indicated.    Reason:  Weakness              Key Recommendations:      PHAM Saavedra    AVS/Discharge Summary is the source of truth; this is a helpful guide for improved communication of patient story

## 2024-07-21 NOTE — ED PROVIDER NOTES
ED Provider Note  [facility]      History     Chief Complaint   Patient presents with    Abdominal Pain    Nausea & Vomiting     HPI  Alis Hartman is a 69 year old female past medical history significant for intermittent A-fib anticoagulated on Eliquis, history of recent hospitalization 7/12/2024 through 7/17/2024 with diagnoses of uterine sarcoma bilateral hydro ureter, status post diagnostic laparoscopy converted to exploratory laparotomyWith hysterectomy bilateral salpingo-oophorectomy, lysis of adhesions and repair of cystotomy, insertion of suprapubic catheter 7/12/2024 who presents emergency department tonight with concerns for abdominal pain and nausea/vomiting.    Patient recently discharged with indwelling suprapubic catheter and Shahid catheter7/17/2024.    She notes about 2 to 3 days ago she started experiencing lower abdominal pain bilaterally, and starting today has had increasing nausea vomiting with her symptoms.  This is associated with no bowel movement in 4 days, no passing gas in 4 days.  She denies any associated fevers, cough dyspnea chest pain.  She denies any change in her urine output from her suprapubic catheter as well as Shahid.  She is not having significant vaginal bleeding.  She notes she has had poor oral intake since symptoms started.    Past Medical History  Past Medical History:   Diagnosis Date    Atrial fibrillation (H)     Depression     Hypertension     Malignant neoplasm of endocervix (H)     Tx with radiation    Other chronic pain     Low back    Schizophrenia (H)     Urinary incontinence      Past Surgical History:   Procedure Laterality Date    ABDOMINOPLASTY      BIOPSY CERVICAL, LOCAL EXCISION, SINGLE/MULTIPLE  10/26/2011    Procedure:BIOPSY CERVICAL, LOCAL EXCISION, SINGLE/MULTIPLE; EUA, cervical biopsies; Surgeon:BETTY TINEO; Location:[facility] OR    BIOPSY VAGINAL N/A 6/8/2021    Procedure: BIOPSY, VAGINA;  Surgeon: Dany Perez MD;   Location: [facility]    CYSTOSCOPY N/A 5/17/2024    Procedure: Cystoscopy;  Surgeon: Dany Perez MD;  Location: [facility]    CYSTOSTOMY, INSERT TUBE SUPRAPUBIC, COMBINED  7/12/2024    Procedure: Insertion of supra-pubic catheter;  Surgeon: Dany Perez MD;  Location: [facility]    DILATION AND CURETTAGE, WITH ULTRASOUND GUIDANCE N/A 5/17/2024    Procedure: Dilation and curettage of the uterus with drainage of uterine fluid under ultrasound guidance, lysis of vaginal adhesions;  Surgeon: Dany Perez MD;  Location: [facility]    EXAM UNDER ANESTHESIA PELVIC N/A 3/12/2020    Procedure: Exam under anesthsia, vaginal biopsies, possible CO2 laser of the vagina;  Surgeon: Lilliam Roy MD;  [facility]    GI SURGERY  2004    gastric bypass    HYSTERECTOMY TOTAL ABDOMINAL, BILATERAL SALPINGO-OOPHORECTOMY, COMBINED Bilateral 7/12/2024    Procedure: Diagnostic laparoscopy converted to exploratory laparotomy, total abdominal hysterectomy, bilateral salpingo-oophorectomy, lysis of adhesions >60 min;  Surgeon: Dany Perez MD;  Location: [facility]    LASER CO2 VAGINA N/A 3/2/2015    Procedure: LASER CO2 VAGINA;  Surgeon: Mariela Abdalla MD;  Location: [facility]    LASER CO2 VAGINA N/A 5/24/2019    Procedure: Exam under anesthesia, vaginal biopsies, CO2 laser of the vagina;  Surgeon: Lilliam Roy MD;  Location: [facility]    LASER CO2 VAGINA N/A 6/8/2021    Procedure: Exam under anesthesia, laser ablation of the upper vagina;  Surgeon: Dany Perez MD;  Location: [facility]    REPAIR BLADDER N/A 7/12/2024    Procedure: Repair bladder;  Surgeon: Dany Perez MD;  Location: [facility]     No current outpatient medications on file.    Allergies   Allergen Reactions    Ibuprofen Nausea and Vomiting    Shrimp     Sulfa Antibiotics Rash     Family History  Family History   Problem Relation Age of Onset    Heart Disease Father     Heart Failure Father     No Known Problems Brother     No Known Problems Brother     No Known  Problems Brother     Pancreatitis Brother     Hypertension Sister     Peripheral Vascular Disease Sister     No Known Problems Sister     No Known Problems Son     No Known Problems Daughter      Social History   Social History     Tobacco Use    Smoking status: Never    Smokeless tobacco: Never   Substance Use Topics    Alcohol use: Not Currently    Drug use: No      A medically appropriate review of systems was performed with pertinent positives and negatives noted in the HPI, and all other systems negative.    Physical Exam   BP: 127/72  Pulse: 72  Temp: 97.8  F (36.6  C)  Resp: 18  SpO2: 100 %  Physical Exam  GENERAL APPEARANCE: The patient is well developed, well appearing, and in no acute distress.  HEAD:  Normocephalic and atraumatic.   EENT: Voice normal.  Oral mucosa dry.  NECK: Trachea is midline.No lymphadenopathy or tenderness.  LUNGS: Breath sounds are equal and clear bilaterally. No wheezes, rhonchi, or rales.  HEART: Regular rate and normal rhythm.  Radial pulses 2+ bilaterally.  ABDOMEN: Exam of the abdomen shows indwelling suprapubic catheter without skin changes suggest skin infection.  Patient also has indwelling grenade drain, and Shahid catheter.  Patient is nondistended she does have focal tenderness to palpation over the lower abdomen without rebound tenderness, no upper abdominal tenderness.  EXTREMITIES: No cyanosis, clubbing, or edema.  NEUROLOGIC: No focal sensory or motor deficits are noted.  PSYCHIATRIC: The patient is awake, alert.  Appropriate mood and affect.  SKIN: Warm, dry, and well perfused. Good turgor.      ED Course, Procedures, & Data      EKG 7/21/2024:  Normal sinus rhythm, ventricular rate 69 QTc 428.  On my interpretation rhythm is regular.  There is low voltage.  There is a P wave of every QRS complex.  No visible ST elevation or depression to suggest ischemia.     Results for orders placed or performed during the hospital encounter of 07/21/24   CT Abdomen Pelvis w  Contrast     Status: None    Narrative    EXAMINATION: CT ABDOMEN PELVIS W CONTRAST, 7/21/2024 6:27 PM    TECHNIQUE:  Helical CT images from the thoracic inlet through the  symphysis pubis were obtained with contrast. Contrast dose: 109ml  isovue 370    COMPARISON: CT chest abdomen and pelvis 5/3/2024, multiple priors.    HISTORY: Recent hysterectomy oophorectomy and surgery, new nausea and  vomiting leukocytosis evaluate for pelvic infection, bowel obstruction    FINDINGS:    Lower chest:  Partially visualized heart unremarkable. Visualized lung bases  unremarkable. Calcific density of the left lung base.    Abdomen and pelvis:  Liver: Stable 8 mm hypoattenuating hepatic cyst in the left lobe.  Remainder of liver unremarkable.  Biliary System: No intrahepatic or extrahepatic biliary ductal  dilation. Minimally distended gallbladder, no wall thickening. No  radiopaque stones.  Pancreas: Mildly prominent pancreatic duct without definitive  dilation. Otherwise unremarkable.  Adrenal glands: Unremarkable  Spleen: Multiple calcified splenic granulomas, otherwise unremarkable.  Kidneys: Improved, now mild bilateral hydronephrosis. Symmetric  slightly heterogeneous nephrogram, no stones. Mild reactive urothelial  enhancement intermittently along the ureters otherwise unremarkable.   Pelvis: Surgical changes of abdominal hysterectomy and bilateral  salpingo-oophorectomy. Shahid catheter and suprapubic catheter seen  within the bladder with balloons inflated. Multiple foci of air are  seen within and surrounding the bladder, there are diffuse  inflammatory changes surrounding the bladder. The bladder is not  well-defined, collapsed. The wall posteriorly to the right of midline,  series 4 image 307 is not well visualized with a small amount of fluid  and air in this region.    Gastrointestinal tract: Surgical changes of Kiko-en-Y gastric bypass,  unchanged small hiatal hernia with some fluid in the esophagus.  Multiple  fluid-filled loops of small and large bowel, fluid and stool  is seen down to level of the rectum. Pseudothickening appearance of  several loops of small bowel due to decompression, decompressed ileum  down to the level of the IC junction. In the context of possible early  obstruction, transition point may be seen in the anterior abdomen  (sagittal series 7 image 80) and coronal series 4 images 223 through  258. Small bowel loops downstream from this region are collapsed to  the ileocecal valve however there is still stool and fluid throughout  the colon as discussed above. Reactive changes in pelvic bowel loops  noted.    Mesentery/peritoneum/retroperitoneum: Inflammatory changes without  definitive organized fluid collection in the pelvis. Scattered foci of  intra-abdominal air, noted along the liver (series 3 image 16), for  example. Several foci of air within the pelvis, multiple which appear  external to the bladder in the right hemipelvis (series 3 image 64).  Peroneal thickening along the anterior low abdomen. Perirectal and  presacral edema.    Lymph nodes: Multiple enlarged reactive retroperitoneal lymph nodes,  and no mesenteric adenopathy.  Vasculature: Patent abdominal aorta, nonaneurysmal, patent branch  vasculature. The portal vein, splenic vein, and superior mesenteric  vein are patent without obstruction.    Bones: No acute fractures, no high-grade osseous lesions. Degenerative  changes of the lumbar spine unchanged grade 1 anterolisthesis L3 on  L4, and grade 2 anterolisthesis of L4 on L5. Multilevel disc height  loss with vacuum disc phenomenon. Posterior spondylosis, moderate to  severe.    Soft Tissues: Soft tissue anasarca along the anterior low abdomen,  soft tissue gas noted at the site of the suprapubic catheter entrance.  No organized soft tissue fluid collection. Anasarca along the  bilateral hips.        Impression    IMPRESSION:   1. Surgical changes of hysterectomy and  bilateral  salpingo-oophorectomy. Ill-defined inflammatory changes throughout the  low pelvis without definitive organized fluid collection such as  abscess identified.  2. Multiple foci of air seen within the abdomen, this may be due to  the recent surgery or the percutaneous abdominal drain.  3. Multiple fluid-filled loops of bowel. Fluid-filled colon is seen  down to level of the rectum suggestive of slow motility. However the  distal ileum is decompressed with possible transition point in the  anterior lower abdomen as detailed above. May represent mixed etiology  of ileus with partial obstruction. Radiographic follow-up suggested.  4. Improved now mild bilateral hydronephrosis. Slightly heterogeneous  enhancement of the kidneys. Mild urothelial enhancement of the  ureters, this is likely reactive.   5. Multiple enlarged retroperitoneal lymph nodes which are likely  reactive.  6. Soft tissue anasarca. There is also edema involving the mesentery  and pelvic soft tissues.  7. Soft tissue gas and inflammatory changes within the abdominal wall  at the site of the suprapubic catheter, no organized fluid collection  at this location.   8. Bladder is collapsed with Shahid catheter and suprapubic catheter in  place. 9. Bladder wall is difficult to discern which may be due to  adjacent inflammatory changes and timing of contrast bolus which is  slightly early. Possible defect along the right posterior bladder wall  with adjacent fluid and air. Of note, a bladder repair was made during  surgery. Could consider follow-up CT cystogram to evaluate for any  active leak in this region.      I have personally reviewed the examination and initial interpretation  and I agree with the findings.    FORREST RAMIREZ MD         SYSTEM ID:  [id number]   Comprehensive metabolic panel     Status: Abnormal   Result Value Ref Range    Sodium 139 135 - 145 mmol/L    Potassium 3.7 3.4 - 5.3 mmol/L    Carbon Dioxide (CO2) 29 22 - 29 mmol/L     Anion Gap 10 7 - 15 mmol/L    Urea Nitrogen 15.7 8.0 - 23.0 mg/dL    Creatinine 0.86 0.51 - 0.95 mg/dL    GFR Estimate 72 >60 mL/min/1.73m2    Calcium 8.3 (L) 8.8 - 10.4 mg/dL    Chloride 100 98 - 107 mmol/L    Glucose 105 (H) 70 - 99 mg/dL    Alkaline Phosphatase 66 40 - 150 U/L    AST 12 0 - 45 U/L    ALT 5 0 - 50 U/L    Protein Total 6.0 (L) 6.4 - 8.3 g/dL    Albumin 2.5 (L) 3.5 - 5.2 g/dL    Bilirubin Total 0.4 <=1.2 mg/dL   Lipase     Status: Normal   Result Value Ref Range    Lipase 53 13 - 60 U/L   UA with Microscopic reflex to Culture     Status: Abnormal    Specimen: Urine, Catheter   Result Value Ref Range    Color Urine Orange (A) Colorless, Straw, Light Yellow, Yellow    Appearance Urine Cloudy (A) Clear    Glucose Urine Negative Negative mg/dL    Bilirubin Urine Negative Negative    Ketones Urine 10 (A) Negative mg/dL    Specific Gravity Urine 1.009 1.003 - 1.035    Blood Urine Large (A) Negative    pH Urine 6.5 5.0 - 7.0    Protein Albumin Urine 100 (A) Negative mg/dL    Urobilinogen Urine Normal Normal, 2.0 mg/dL    Nitrite Urine Positive (A) Negative    Leukocyte Esterase Urine Large (A) Negative    Bacteria Urine Many (A) None Seen /HPF    WBC Clumps Urine Present (A) None Seen /HPF    RBC Urine 84 (H) <=2 /HPF    WBC Urine >182 (H) <=5 /HPF    Squamous Epithelials Urine 8 (H) <=1 /HPF    Narrative    Urine Culture ordered based on laboratory criteria   CBC with platelets and differential     Status: Abnormal   Result Value Ref Range    WBC Count 12.7 (H) 4.0 - 11.0 10e3/uL    RBC Count 3.64 (L) 3.80 - 5.20 10e6/uL    Hemoglobin 8.5 (L) 11.7 - 15.7 g/dL    Hematocrit 27.8 (L) 35.0 - 47.0 %    MCV 76 (L) 78 - 100 fL    MCH 23.4 (L) 26.5 - 33.0 pg    MCHC 30.6 (L) 31.5 - 36.5 g/dL    RDW 23.9 (H) 10.0 - 15.0 %    Platelet Count 474 (H) 150 - 450 10e3/uL    % Neutrophils 87 %    % Lymphocytes 6 %    % Monocytes 6 %    % Eosinophils 0 %    % Basophils 0 %    % Immature Granulocytes 1 %    NRBCs per 100  WBC 0 <1 /100    Absolute Neutrophils 11.0 (H) 1.6 - 8.3 10e3/uL    Absolute Lymphocytes 0.8 0.8 - 5.3 10e3/uL    Absolute Monocytes 0.8 0.0 - 1.3 10e3/uL    Absolute Eosinophils 0.0 0.0 - 0.7 10e3/uL    Absolute Basophils 0.0 0.0 - 0.2 10e3/uL    Absolute Immature Granulocytes 0.1 <=0.4 10e3/uL    Absolute NRBCs 0.0 10e3/uL   [place] Draw     Status: None    Narrative    The following orders were created for panel order [place] Draw.  Procedure                               Abnormality         Status                     ---------                               -----------         ------                     Extra Red Top Tube[[id number]]                               Final result                 Please view results for these tests on the individual orders.   Extra Red Top Tube     Status: None   Result Value Ref Range    Hold Specimen JI    UA with Microscopic reflex to Culture     Status: Abnormal    Specimen: Urine, Catheter   Result Value Ref Range    Color Urine Orange (A) Colorless, Straw, Light Yellow, Yellow    Appearance Urine Cloudy (A) Clear    Glucose Urine Negative Negative mg/dL    Bilirubin Urine Negative Negative    Ketones Urine 10 (A) Negative mg/dL    Specific Gravity Urine 1.010 1.003 - 1.035    Blood Urine Large (A) Negative    pH Urine 6.5 5.0 - 7.0    Protein Albumin Urine 100 (A) Negative mg/dL    Urobilinogen Urine Normal Normal, 2.0 mg/dL    Nitrite Urine Positive (A) Negative    Leukocyte Esterase Urine Large (A) Negative    Bacteria Urine Many (A) None Seen /HPF    WBC Clumps Urine Present (A) None Seen /HPF    RBC Urine 84 (H) <=2 /HPF    WBC Urine >182 (H) <=5 /HPF    Squamous Epithelials Urine 3 (H) <=1 /HPF    Narrative    Urine Culture ordered based on laboratory criteria   Glucose by meter     Status: Normal   Result Value Ref Range    GLUCOSE BY METER POCT 86 70 - 99 mg/dL   EKG 12 lead     Status: None   Result Value Ref Range    Systolic Blood Pressure  mmHg    Diastolic Blood  Pressure  mmHg    Ventricular Rate 69 BPM    Atrial Rate 69 BPM    WA Interval 138 ms    QRS Duration 72 ms     ms    QTc 428 ms    P Axis 37 degrees    R AXIS -14 degrees    T Axis -7 degrees    Interpretation ECG       Sinus rhythm  Possible Left atrial enlargement  Septal infarct , age undetermined  Possible Lateral infarct , age undetermined  Abnormal ECG  Unconfirmed report - interpretation of this ECG is computer generated - see medical record for final interpretation  Confirmed by - EMERGENCY ROOM, PHYSICIAN ([id number]),  ARJUN CHAUDHARY ([id number]) on 7/21/2024 7:17:58 PM     Wound Aerobic Bacterial Culture Routine With Gram Stain     Status: Abnormal (Preliminary result)    Specimen: Abdomen; Wound   Result Value Ref Range    Gram Stain Result 2+ Gram positive cocci (A)     Gram Stain Result 1+ Gram negative bacilli (A)     Gram Stain Result 3+ WBC seen (A)    CBC with platelets differential     Status: Abnormal    Narrative    The following orders were created for panel order CBC with platelets differential.  Procedure                               Abnormality         Status                     ---------                               -----------         ------                     CBC with platelets and d...[[id number]]  Abnormal            Final result                 Please view results for these tests on the individual orders.     Medications   albuterol (PROVENTIL HFA/VENTOLIN HFA) inhaler (has no administration in time range)   apixaban ANTICOAGULANT (ELIQUIS) tablet 2.5 mg ( Oral Automatically Held 7/24/24 2000)   atenolol (TENORMIN) tablet 100 mg (has no administration in time range)   benztropine (COGENTIN) tablet 1 mg (has no administration in time range)   diclofenac (VOLTAREN) 1 % topical gel 2 g (has no administration in time range)   furosemide (LASIX) tablet 40 mg (has no administration in time range)   gabapentin (NEURONTIN) tablet 1,600 mg (has no administration in time range)   oxyBUTYnin  (DITROPAN) tablet 5 mg (has no administration in time range)   oxyCODONE (ROXICODONE) tablet 5 mg (has no administration in time range)   phenazopyridine (PYRIDIUM) tablet 100 mg (has no administration in time range)   QUEtiapine (SEROquel) tablet 400 mg (400 mg Oral Not Given 7/21/24 2256)   sertraline (ZOLOFT) tablet 150 mg (has no administration in time range)   zolpidem (AMBIEN) tablet 10 mg (has no administration in time range)   lidocaine 1 % 0.1-1 mL (has no administration in time range)   lidocaine (LMX4) cream (has no administration in time range)   sodium chloride (PF) 0.9% PF flush 3 mL (3 mLs Intracatheter $Given 7/21/24 2257)   sodium chloride (PF) 0.9% PF flush 3 mL (has no administration in time range)   hydrALAZINE (APRESOLINE) tablet 10 mg (has no administration in time range)     Or   hydrALAZINE (APRESOLINE) injection 10 mg (has no administration in time range)   melatonin tablet 1 mg (has no administration in time range)   senna-docusate (SENOKOT-S/PERICOLACE) 8.6-50 MG per tablet 1 tablet (has no administration in time range)     Or   senna-docusate (SENOKOT-S/PERICOLACE) 8.6-50 MG per tablet 2 tablet (has no administration in time range)   ondansetron (ZOFRAN ODT) ODT tab 4 mg ( Oral See Alternative 7/21/24 2137)     Or   ondansetron (ZOFRAN) injection 4 mg (4 mg Intravenous $Given 7/21/24 2137)   prochlorperazine (COMPAZINE) injection 5 mg (has no administration in time range)     Or   prochlorperazine (COMPAZINE) tablet 5 mg (has no administration in time range)     Or   prochlorperazine (COMPAZINE) suppository 12.5 mg (has no administration in time range)   calcium carbonate (TUMS) chewable tablet 1,000 mg (has no administration in time range)   alum & mag hydroxide-simethicone (MAALOX) suspension 30 mL (has no administration in time range)   benzocaine-menthol (CHLORASEPTIC MAX) 15-10 MG lozenge 1 lozenge (has no administration in time range)   glucose gel 15-30 g (has no administration in  time range)     Or   dextrose 50 % injection 25-50 mL (has no administration in time range)     Or   glucagon injection 1 mg (has no administration in time range)   HYDROmorphone (DILAUDID) injection 0.2 mg (has no administration in time range)   bisacodyl (DULCOLAX) suppository 10 mg (has no administration in time range)   sodium phosphate (FLEET ENEMA) 1 enema (has no administration in time range)   sodium chloride 0.9 % infusion ( Intravenous $New Bag 7/21/24 2258)   acetaminophen (TYLENOL) tablet 650 mg (650 mg Oral Not Given 7/21/24 2303)   methocarbamol (ROBAXIN) tablet 500 mg (has no administration in time range)   naloxone (NARCAN) injection 0.2 mg (has no administration in time range)     Or   naloxone (NARCAN) injection 0.4 mg (has no administration in time range)     Or   naloxone (NARCAN) injection 0.2 mg (has no administration in time range)     Or   naloxone (NARCAN) injection 0.4 mg (has no administration in time range)   ertapenem (INVanz) 1 g vial to attach to  mL bag (has no administration in time range)   lactated ringers BOLUS 1,000 mL (0 mLs Intravenous Stopped 7/21/24 1720)   ondansetron (ZOFRAN) injection 4 mg (4 mg Intravenous $Given 7/21/24 1538)   HYDROmorphone (PF) (DILAUDID) injection 0.5 mg (0.5 mg Intravenous $Given 7/21/24 1538)   iopamidol (ISOVUE-370) solution 109 mL (109 mLs Intravenous $Given 7/21/24 1816)   sodium chloride (PF) 0.9% PF flush 77 mL (77 mLs Intravenous $Given 7/21/24 1816)   ertapenem (INVanz) 1 g vial to attach to  mL bag (1 g Intravenous $New Bag 7/21/24 2057)   HYDROmorphone (PF) (DILAUDID) injection 0.5 mg (0.5 mg Intravenous $Given 7/21/24 2131)     Labs Ordered and Resulted from Time of ED Arrival to Time of ED Departure   COMPREHENSIVE METABOLIC PANEL - Abnormal       Result Value    Sodium 139      Potassium 3.7      Carbon Dioxide (CO2) 29      Anion Gap 10      Urea Nitrogen 15.7      Creatinine 0.86      GFR Estimate 72      Calcium 8.3 (*)      Chloride 100      Glucose 105 (*)     Alkaline Phosphatase 66      AST 12      ALT 5      Protein Total 6.0 (*)     Albumin 2.5 (*)     Bilirubin Total 0.4     ROUTINE UA WITH MICROSCOPIC REFLEX TO CULTURE - Abnormal    Color Urine Orange (*)     Appearance Urine Cloudy (*)     Glucose Urine Negative      Bilirubin Urine Negative      Ketones Urine 10 (*)     Specific Gravity Urine 1.009      Blood Urine Large (*)     pH Urine 6.5      Protein Albumin Urine 100 (*)     Urobilinogen Urine Normal      Nitrite Urine Positive (*)     Leukocyte Esterase Urine Large (*)     Bacteria Urine Many (*)     WBC Clumps Urine Present (*)     RBC Urine 84 (*)     WBC Urine >182 (*)     Squamous Epithelials Urine 8 (*)    CBC WITH PLATELETS AND DIFFERENTIAL - Abnormal    WBC Count 12.7 (*)     RBC Count 3.64 (*)     Hemoglobin 8.5 (*)     Hematocrit 27.8 (*)     MCV 76 (*)     MCH 23.4 (*)     MCHC 30.6 (*)     RDW 23.9 (*)     Platelet Count 474 (*)     % Neutrophils 87      % Lymphocytes 6      % Monocytes 6      % Eosinophils 0      % Basophils 0      % Immature Granulocytes 1      NRBCs per 100 WBC 0      Absolute Neutrophils 11.0 (*)     Absolute Lymphocytes 0.8      Absolute Monocytes 0.8      Absolute Eosinophils 0.0      Absolute Basophils 0.0      Absolute Immature Granulocytes 0.1      Absolute NRBCs 0.0     ROUTINE UA WITH MICROSCOPIC REFLEX TO CULTURE - Abnormal    Color Urine Orange (*)     Appearance Urine Cloudy (*)     Glucose Urine Negative      Bilirubin Urine Negative      Ketones Urine 10 (*)     Specific Gravity Urine 1.010      Blood Urine Large (*)     pH Urine 6.5      Protein Albumin Urine 100 (*)     Urobilinogen Urine Normal      Nitrite Urine Positive (*)     Leukocyte Esterase Urine Large (*)     Bacteria Urine Many (*)     WBC Clumps Urine Present (*)     RBC Urine 84 (*)     WBC Urine >182 (*)     Squamous Epithelials Urine 3 (*)    LIPASE - Normal    Lipase 53     GLUCOSE MONITOR NURSING POCT    GLUCOSE MONITOR NURSING POCT   GLUCOSE MONITOR NURSING POCT   URINE CULTURE   URINE CULTURE   ANAEROBIC BACTERIAL CULTURE ROUTINE     CT Abdomen Pelvis w Contrast   Final Result   IMPRESSION:    1. Surgical changes of hysterectomy and bilateral   salpingo-oophorectomy. Ill-defined inflammatory changes throughout the   low pelvis without definitive organized fluid collection such as   abscess identified.   2. Multiple foci of air seen within the abdomen, this may be due to   the recent surgery or the percutaneous abdominal drain.   3. Multiple fluid-filled loops of bowel. Fluid-filled colon is seen   down to level of the rectum suggestive of slow motility. However the   distal ileum is decompressed with possible transition point in the   anterior lower abdomen as detailed above. May represent mixed etiology   of ileus with partial obstruction. Radiographic follow-up suggested.   4. Improved now mild bilateral hydronephrosis. Slightly heterogeneous   enhancement of the kidneys. Mild urothelial enhancement of the   ureters, this is likely reactive.    5. Multiple enlarged retroperitoneal lymph nodes which are likely   reactive.   6. Soft tissue anasarca. There is also edema involving the mesentery   and pelvic soft tissues.   7. Soft tissue gas and inflammatory changes within the abdominal wall   at the site of the suprapubic catheter, no organized fluid collection   at this location.    8. Bladder is collapsed with Shahid catheter and suprapubic catheter in   place. 9. Bladder wall is difficult to discern which may be due to   adjacent inflammatory changes and timing of contrast bolus which is   slightly early. Possible defect along the right posterior bladder wall   with adjacent fluid and air. Of note, a bladder repair was made during   surgery. Could consider follow-up CT cystogram to evaluate for any   active leak in this region.         I have personally reviewed the examination and initial interpretation   and  I agree with the findings.      FORREST RAMIREZ MD            SYSTEM ID:  [id number]             Critical care was not performed.     Medical Decision Making  The patient's presentation was of high complexity (a chronic illness severe exacerbation, progression, or side effect of treatment).    The patient's evaluation involved:  review of external note(s) from 1 sources (see separate area of note for details)  review of 3+ test result(s) ordered prior to this encounter (see separate area of note for details)  ordering and/or review of 3+ test(s) in this encounter (see separate area of note for details)  discussion of management or test interpretation with another health professional (gynecology oncology)    The patient's management necessitated moderate risk (prescription drug management including medications given in the ED), moderate risk (IV contrast administration), and high risk (a decision regarding hospitalization).    Assessment & Plan    This is a 69-year-old female with complex gynecologic history presenting with concerns for several days of lower abdominal pain along with new onset nausea and vomiting in the setting of recent gynecologic procedure 7/12/2024.  On presentation vital signs reviewed within normal limits she is afebrile normoxic is not tachycardic.  On exam she has focal tenderness to palpation of the lower abdomen without rebound tenderness or any upper abdominal tenderness.  Broad differential considered includes possibility of constipation bowel obstruction perforated viscus, pelvic infection, UTI, diverticulitis, amongst others.  Will initiate broad workup including screening labs, fluids antiemetics pain meds as well as 2 urine samples from the suprapubic catheter and Shahid catheter.  Will initiate CT imaging with contrast of the abdomen and pelvis to further evaluate pending creatinine.      Initial EKG without findings for ischemia or other arrhythmias.  Labs returned showing  leukocytosis white count 12.7 uptrending from 6 days ago.  Patient hemic 8.5 hemoglobin similar to 6 days ago.Chemistry shows normal creatinine without anion gap.  Lipase normal suggest against pancreatitis.  Both urine samples return suggestive of infection with positive nitrites on each large leukocyte esterase on each and clumps of WBCs on each.  CT results returned showing surgical changes of the hysterectomy without fluid collection, multiple foci of the air within the abdomen, multiple fluid-filled loops of bowel, improved bilateral hydronephrosis multiple enlarged lymph nodes patient was seen by gynecology oncology down in the emergency department and recommend admission to their service for further management of pain.    Based on prior urine cultures ertapenem was initiated from the ED to cover for prior resistant organisms.    Patient seen and discussed with attending physician , who agrees with my plan of care.    I have reviewed the nursing notes. I have reviewed the findings, diagnosis, plan and need for follow up with the patient.    Current Discharge Medication List          Final diagnoses:   Lower abdominal pain   Acute cystitis without hematuria       ALYX Dias  [facility] EMERGENCY DEPARTMENT  7/21/2024     Melissa Brown PA-C  07/21/24 [id number]

## 2024-07-21 NOTE — TELEPHONE ENCOUNTER
Nurse Triage SBAR    Is this a 2nd Level Triage? Yes    Situation: Patient is not able to eat, hold in food, vomiting liquids for 2.5 days. Has also been vomiting medication. Patient is not able to hold in sips of water, patient vomits soon after taking the medication. Patient is having urine output, daughter is not concerned about amount of urine in the drainage bag, urine looks orange.     Patient gave verbal consent to speak with daughter.     Background: Surgery on 7/12/24. Patient has a drain and a urinary catheter in place.     Assessment:   Increasing pain throughout abdomen pain is 8 at time of the call, is taking pain medicine but not staying down  Is having some lightheadedness today - able to walk without issues    Recommendation: Per disposition, Go to ED now (or PCP Triage). Advised daughter that on-call Provider will be consulted for recommendation. Advised patient/daughter to call back with any new or worsening symptoms. Daughter verbalized understanding and agrees with plan.    PCP is Dany Perez, located at [facility]  Paged On-Call Provider at: 12:34 PM    Provider, Dr. Cassidy, returning page to Nurse Advisors at 12:58 PM    Provider Recommendations/Plan:  Is patient having bowel movement, passing flatus?  Provider will call and speak with daughter, patient directly       Daughter's phone number is [phone].     Provider Recommendation Follow Up: Provider will call and speak with daughter, patient directly.. No additional follow up needed by FNA Triage at this time.     Protocol Recommended Disposition: Paged/Called Provider    Mell Rosario RN on 7/21/2024 at 12:12 PM  [facility]  Reason for Disposition   [1] Vomiting AND [2] persists > 4 hours    Additional Information   Negative: Sounds like a life-threatening emergency to the triager   Negative: Chest pain   Negative: Difficulty breathing   Negative: Acting confused (e.g., disoriented, slurred speech)  or excessively sleepy   Negative: Post-Op tonsil and adenoid surgery, symptoms or questions about   Negative: Surgical incision symptoms and questions   Negative: [1] Pain or burning with passing urine (urination) AND [2] male   Negative: [1] Pain or burning with passing urine (urination) AND [2] female   Negative: Constipation   Negative: New or worsening leg (calf, thigh) pain   Negative: New or worsening leg swelling   Negative: Dizziness is severe, or persists > 24 hours after surgery   Negative: Pain, redness, swelling, or pus at IV Site   Negative: Symptoms arising from use of a urinary catheter (e.g., Coude, Shahid)   Negative: Cast problems or questions   Negative: Medication question   Negative: [1] Widespread rash AND [2] bright red, sunburn-like   Negative: [1] SEVERE headache AND [2] after spinal (epidural) anesthesia    Protocols used: Post-Op Symptoms and [id number]

## 2024-07-21 NOTE — MEDICATION SCRIBE - ADMISSION MEDICATION HISTORY
Medication Scribe Admission Medication History    Admission medication history is complete. The information provided in this note is only as accurate as the sources available at the time of the update.    Information Source(s): Patient and CareEverywhere/SureScripts via in-person    Pertinent Information:   -Pt stated she threw up her medications his morning and last night  -Per dispense history Pt's Oxybutynin 5 mg started on 7/16/24  -Pt stated she does not take her Vitamin D3 125 mcg on Monday, Wednesday, and Thursday    Changes made to PTA medication list:  Added: Tizanidine 4 mg, Gabapentin 800 mg, Vitamin B-12 injection, Hydrocortisone 2.5% cream  Deleted: Kenalog 0.1% oint, Narcan spray,   Changed: zoloft 25 mg (every day) --> Zoloft 100 mg (1.5 tab every day), Lasix 20 mg --> lasix 40 mg, Calcium w/ Vitamin D3 (no frequency) --> Calcium w/ Vitamin D3 (every day)    Allergies reviewed with patient and updates made in EHR: yes    Medication History Completed By: Emmanuelle Goode 7/21/2024 5:02 PM    PTA Med List   Medication Sig Last Dose    acetaminophen (TYLENOL) 325 MG tablet Take 2 tablets (650 mg) by mouth every 6 hours as needed for mild pain Past Week at unknown    albuterol (PROAIR HFA/PROVENTIL HFA/VENTOLIN HFA) 108 (90 Base) MCG/ACT inhaler Inhale 1-2 puffs into the lungs every 4 hours as needed for shortness of breath Unknown at unknown    apixaban ANTICOAGULANT (ELIQUIS) 2.5 MG tablet Take 1 tablet (2.5 mg) by mouth 2 times daily 7/21/2024 at am    atenolol (TENORMIN) 100 MG tablet TAKE 1 TABLET(100 MG) BY MOUTH DAILY FOR HIGH BLOOD PRESSURE 7/21/2024 at am    benztropine (COGENTIN) 1 MG tablet Take 1 tablet by mouth daily 7/21/2024 at am    calcium Citrate-vitamin D 500-400 MG-UNIT CHEW Take 1 tablet by mouth daily 7/21/2024 at am    childrens multivitamin w/iron (FLINTSTONES COMPLETE) 60 MG chewable tablet Take 2 tablets by mouth daily 7/21/2024 at am    cholecalciferol 125 MCG (5000 UT) CAPS Take  5000 mg 4 times a week 7/21/2024 at am    cyanocobalamin (CYANOCOBALAMIN) 1000 MCG/ML injection Inject 1 mL (1,000 mcg) into a muscle every month. More than a month at unknown    diclofenac (VOLTAREN) 1 % topical gel Apply to: Skin on  Both/All Knee for pain. Topical 1% gel, 4 g applied TOPICALLY to lower extremities 3 times daily PRN ; Past Week at unknown    docusate sodium (COLACE) 100 MG capsule Take 100 mg by mouth 2 times daily as needed Past Week at unknown    ferrous sulfate (CASSANDRA-IN-SOL) 75 (15 FE) MG/ML oral drops Take 15 mg by mouth daily 7/21/2024 at am    furosemide (LASIX) 40 MG tablet Take 40 mg by mouth daily 7/21/2024 at am    gabapentin (NEURONTIN) 800 MG tablet Take 2 tablets by mouth 2 times daily 7/21/2024 at am    hydrocortisone 2.5 % cream Apply topically as needed Unknown at unknown    lidocaine (XYLOCAINE) 5 % external ointment Apply 1 Application topically as needed Unknown at unknown    naloxone (NARCAN) 4 MG/0.1ML nasal spray Spray 1 spray (4 mg) into one nostril alternating nostrils as needed for opioid reversal every 2-3 minutes until assistance arrives     oxyBUTYnin (DITROPAN) 5 MG tablet Take 1 tablet (5 mg) by mouth 3 times daily for 13 days 7/21/2024 at am    oxyCODONE (ROXICODONE) 5 MG tablet Take 1 tablet (5 mg) by mouth every 4 hours as needed for breakthrough pain (pain control or improvement in physical function. Hold dose for analgesic side effects.) 7/21/2024 at am    phenazopyridine (PYRIDIUM) 100 MG tablet Take 1 tablet (100 mg) by mouth 3 times daily as needed for urinary tract discomfort Unknown at unknown    QUEtiapine (SEROQUEL) 400 MG tablet Take 400 mg by mouth at bedtime 7/20/2024 at pm    senna-docusate (SENOKOT-S/PERICOLACE) 8.6-50 MG tablet Take 1 tablet by mouth 2 times daily 7/21/2024 at am    sertraline (ZOLOFT) 100 MG tablet Take 1.5 tablets by mouth daily 7/21/2024 at am    tiZANidine (ZANAFLEX) 4 MG tablet Take 4 mg by mouth 3 times daily as needed Unknown  at unknown    zolpidem (AMBIEN) 10 MG tablet Take 10 mg by mouth nightly as needed Past Week at unknown

## 2024-07-21 NOTE — TELEPHONE ENCOUNTER
Telephone Progress Note    Returned patient's call to check-in. Spoke with patient's daughter, Joy Hartman, with patient's permission after verifying patient identifiers. Joy reports that for the past 2 and a half days Aranza has been having multiple episodes of emesis. She describes the emesis as dark brown and malodorous. Reports no hematemesis. She additionally reports that about 4 days ago, Aranza went from tolerating a regular diet to tolerating chicken broth and fluids. She  states that she has not been able to eat or drink anything for the past two days. Reports that she has not passed flatus for the past day and has not had a bowel movement since 7/17. She additionally reports increased diffuse abdominal pain as Aranza has not been able to take any of her oral pain medications due to her emesis. Reports light vaginal spotting when using the restroom, but no other vaginal bleeding. Endorses her urine output in her kent is appropriate.     Discussed with Joy and Aranza that being she has been unable to tolerate anything by mouth, is no longer passing flatus, and has not had a bowel movement in many days, that she should present to the [place] [facility] emergency department for evaluation of possible bowel obstruction. They express understanding and state that they will present shortly. No further questions or concerns.     Neisha Morris MD  Obstetrics and Gynecology, PGY-2  07/21/24 1:57 PM

## 2024-07-21 NOTE — H&P
"[facility] History and Physical    Alis Hartman MRN# [id number]   Age: 69 year old YOB: 1955     Date of Admission:  7/21/2024    Primary care provider: Maria Fernanda Eckert             Chief Complaint:   Nausea, vomiting, constipation c/f small bowel obstruction         History of Present Illness:   This patient is a 69 year old female with serous uterine carcinoma who is POD#9 following exploratory laparotomy, total abdominal hysterectomy, bilateral salpingo-oophorectomy, and lysis of adhesions with Dr. Perez on 7/12/24. Her operation was complicated by a cystotomy which was repaired at the time of her operation with subsequent insertion of a supra-pubic catheter. 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 She presents to the emergency department for concerns of a small bowel obstruction given new-onset nausea and vomiting, inability to pass flatus, and no bowel movement since her discharge from the hospital on 7/17.     Aranza endorses that she has been having multiple episodes of emesis for the past 3 days. Additionally endorses significant decrease in PO intake since this time. She states she was able to eat some solid food up until 2 days ago, but states that yesterday she was only able to tolerate broth and today she hasn't been able to keep anything down. Reports that her emesis is brown in color and malodorous. Denies hematemesis. Reports that she has not had a bowel movement since 7/17, however, states that she was able to \"pass a lot of gas\" earlier this afternoon. She endorses that her both her suprapubic and " "kent catheters as well as her TINY drain have been having adequate drainage that has not changed since the time of her discharge. Endorses that her low abdominal incision has started to hurt more over the past few days and has been draining some moisture. Reports she has been placing baby powder over it to help keep it dry.     Aranza additionally reports that she has noticed some light vaginal spotting when wiping after using the restroom. Reports that she has additionally been \"damp\" in her vaginal area. She does not endorse any fevers, but states she is \"always chilled\".          Cancer Treatment History:     3/1996 Initial Diagnosis     Cervical cancer     Moderately differentiated squamous cell carcinoma. She was treated with primary radiation therapy, unsure if she also had chemotherapy or not.  [place] [facility] [place]/22/2014 Procedure     Exam under anesthesia with LASER to the vagina for VAIN 3      5/24/2019 Procedure     12/27/18:  Pap: HSIL, HPV+  5/24/19: PROCEDURES: Vaginal colposcopy, vaginal biopsy, CO2 laser of the vagina  VAGINAL BIOPSY:   - High grade squamous intraepithelial lesion (vaginal intraepithelial neoplasia 3)       3/12/2020 Procedure     10/14/19: Pap HSIL/other HPV+  2/28/2020: biopsy of vagina Vain III; MRI recommended prior to OR; however, patient did not complete this  3/12/2020: EUA, biopsies, LASER: VAIN III      6/8/2021 Procedure     6/8/2021: Exam under anesthesia, vaginal biopsies, laser ablation of the upper vagina with Dr. Perez, biopsies returned showing necrosis, no dysplasia. Hyperbaric oxygen therapy discussed and referral place. Patient was unable to pursue this due to need for transportation to the [facility].      4/6/2023 Imaging     4/6/2023 CT Urogram: IMPRESSION:  1.  Negative CT urogram. No urinary calculi, solid renal mass, or evidence of upper tract urothelial malignancy.   2.  Enlarged uterus, with marked distention of the endometrial canal by " intermediate density material, possibly a mix of fluid and blood products. This may be related to cervical stenosis given the history of prior pelvic radiation. Uterine enlargement may contribute to urinary frequency/urgency.   3.  Indeterminate left pelvic/perirectal mass with rim calcifications measuring up to 4.5 cm. Differentials include an old hematoma or an atypical enlarged lymph node. Consider pelvic MRI with contrast and subtraction imaging for further evaluation. The uterus and endometrium can also be further evaluated at that time.        Pelvic MRI recommended (4/6/2023), however, patient lost to follow-up    Outpatient visit (4/23/2024): Urinary incontinence likely worsened due to pressure from uterine process on the bladder. There is a possibility of vesicovaginal fistula, however, on exam she has constant leakage from the urethra with concern for intrinsic urethral sphincter insufficiency likely secondary to radiation therapy.     Pelvic Ultrasound (4/23/2024): The uterus measures 14.4 x 7.8 x 11.8 cm and contains both cystic and solid components.     CT Chest/Abdomen/Pelvis (5/3/2024): Enlarged uterus with increased size of the intermediate  density distended endometrial canal measuring 9.0 x 8.5 x 14.8 cm. New moderate bilateral hydroureteronephrosis, which may be secondary to the mass effect of the adjacent enlarged uterus.     Procedure (5/17/2024) Dilation and curettage of the uterus with drainage of uterine fluid under ultrasound guidance, lysis of vaginal adhesions, cystoscopy                   Final Pathology: Endometrial serous carcinoma with extensive necrosis     Procedure (7/12/2024) Diagnostic laparoscopy converted to exploratory laparotomy, total abdominal hysterectomy, bilateral salpingo-oophorectomy, lysis of adhesions >60 minutes, repair of bladder, insertion of supra-pubic catheter                    Final Pathology:                               A. Pelvic mass, excision:                                   - Hyalinized and calcified fat necrosis                                  - Negative for malignancy                                  B. Uterus, cervix, left fallopian tube and ovary, hysterectomy with left                                     salpingo-oophorectomy:                                   - Endometrial serous carcinoma, [id number]/[id number]-negative                                   - Uterine serosal fibrous adhesions                                   - Left chronic salpingitis                                   - Ovary with inclusion cysts, negative for malignancy                                  C. Cervical Remnant:                                   - Necrotic cervical tissue, positive for serous carcinoma                                  D. Omentum, biopsy:                                   - Fibroadipose tissue with no significant histologic abnormality          Past Medical History:     Past Medical History:   Diagnosis Date    Atrial fibrillation (H)     Depression     Hypertension     Malignant neoplasm of endocervix (H)     Tx with radiation    Other chronic pain     Low back    Schizophrenia (H)     Urinary incontinence           Past Surgical History:      Past Surgical History:   Procedure Laterality Date    ABDOMINOPLASTY      BIOPSY CERVICAL, LOCAL EXCISION, SINGLE/MULTIPLE  10/26/2011    Procedure:BIOPSY CERVICAL, LOCAL EXCISION, SINGLE/MULTIPLE; EUA, cervical biopsies; Surgeon:BETTY TINEO; Location:[facility] OR    BIOPSY VAGINAL N/A 6/8/2021    Procedure: BIOPSY, VAGINA;  Surgeon: Dany Perez MD;  Location: [facility] OR    CYSTOSCOPY N/A 5/17/2024    Procedure: Cystoscopy;  Surgeon: Dany Perez MD;  Location: [facility] OR    CYSTOSTOMY, INSERT TUBE SUPRAPUBIC, COMBINED  7/12/2024    Procedure: Insertion of supra-pubic catheter;  Surgeon: Dany Perez MD;  Location: [facility]    DILATION AND CURETTAGE, WITH ULTRASOUND GUIDANCE N/A 5/17/2024    Procedure: Dilation and  curettage of the uterus with drainage of uterine fluid under ultrasound guidance, lysis of vaginal adhesions;  Surgeon: Dany Perez MD;  Location: [facility] OR    EXAM UNDER ANESTHESIA PELVIC N/A 3/12/2020    Procedure: Exam under anesthsia, vaginal biopsies, possible CO2 laser of the vagina;  Surgeon: Lilliam Roy MD;  Location: [facility] OR    GI SURGERY  2004    gastric bypass    HYSTERECTOMY TOTAL ABDOMINAL, BILATERAL SALPINGO-OOPHORECTOMY, COMBINED Bilateral 7/12/2024    Procedure: Diagnostic laparoscopy converted to exploratory laparotomy, total abdominal hysterectomy, bilateral salpingo-oophorectomy, lysis of adhesions >60 min;  Surgeon: Dany Perez MD;  Location: [facility]    LASER CO2 VAGINA N/A 3/2/2015    Procedure: LASER CO2 VAGINA;  Surgeon: Mariela Abdalla MD;  Location: [facility] OR    LASER CO2 VAGINA N/A 5/24/2019    Procedure: Exam under anesthesia, vaginal biopsies, CO2 laser of the vagina;  Surgeon: Lilliam Roy MD;  Location: [facility] OR    LASER CO2 VAGINA N/A 6/8/2021    Procedure: Exam under anesthesia, laser ablation of the upper vagina;  Surgeon: Dany Perez MD;  Location: [facility] OR    REPAIR BLADDER N/A 7/12/2024    Procedure: Repair bladder;  Surgeon: Dany Perez MD;  Location: [facility]          Social History:     Social History     Tobacco Use    Smoking status: Never    Smokeless tobacco: Never   Substance Use Topics    Alcohol use: Not Currently          Family History:     Family History   Problem Relation Age of Onset    Heart Disease Father     Heart Failure Father     No Known Problems Brother     No Known Problems Brother     No Known Problems Brother     Pancreatitis Brother     Hypertension Sister     Peripheral Vascular Disease Sister     No Known Problems Sister     No Known Problems Son     No Known Problems Daughter           Immunizations:     Immunization History   Administered Date(s) Administered    COVID-19 12+ (2023-24) (Pfizer) 11/09/2023    COVID-19  Bivalent 12+ (Pfizer) 09/22/2022    COVID-19 MONOVALENT 12+ (Pfizer) 04/19/2021, 05/10/2021, 12/03/2021    COVID-19 Monovalent 12+ (Pfizer 2022) 07/07/2022    DTAP (<7y) 08/05/2008    Hepatitis A (ADULT 19+) 08/05/2008    Hepatitis B, Adult 05/11/2007, 08/05/2008    Influenza (H1N1) 01/14/2010    Influenza (High Dose) 3 valent vaccine 11/07/2019, 10/27/2020, 10/15/2021, 09/22/2022    Influenza (IIV3) PF 10/24/2008, 10/05/2011    Influenza Vaccine 65+ (Fluzone HD) 10/15/2021, 09/28/2023    Influenza Vaccine >6 months,quad, PF 09/17/2015, 11/02/2016, 08/23/2017, 10/01/2018    Influenza, seasonal, injectable, PF 10/27/2008, 01/14/2010    Pneumo Conj 13-V (2010&after) 03/29/2019    Pneumococcal 20 valent Conjugate (Prevnar 20) 05/02/2024    Pneumococcal 23 valent 12/17/2008    TDAP (Adacel,Boostrix) 08/05/2008, 11/10/2022    Zoster recombinant adjuvanted (SHINGRIX) 11/05/2020, 10/15/2021          Allergies:     Allergies   Allergen Reactions    Ibuprofen Nausea and Vomiting    Shrimp     Sulfa Antibiotics Rash          Medications:     Current Outpatient Medications   Medication Instructions    acetaminophen (TYLENOL) 650 mg, Oral, EVERY 6 HOURS PRN    albuterol (PROAIR HFA/PROVENTIL HFA/VENTOLIN HFA) 108 (90 Base) MCG/ACT inhaler 1-2 puffs, Inhalation, EVERY 4 HOURS PRN    apixaban ANTICOAGULANT (ELIQUIS) 2.5 mg, Oral, 2 TIMES DAILY    atenolol (TENORMIN) 100 MG tablet TAKE 1 TABLET(100 MG) BY MOUTH DAILY FOR HIGH BLOOD PRESSURE    benztropine (COGENTIN) 1 MG tablet 1 tablet, Oral, DAILY    calcium Citrate-vitamin D 500-400 MG-UNIT CHEW 1 tablet, Oral, DAILY    childrens multivitamin w/iron (FLINTSTONES COMPLETE) 60 MG chewable tablet 2 tablets, Oral, DAILY    cholecalciferol 125 MCG (5000 UT) CAPS Take 5000 mg 4 times a week    cyanocobalamin (CYANOCOBALAMIN) 1000 MCG/ML injection Inject 1 mL (1,000 mcg) into a muscle every month.    diclofenac (VOLTAREN) 1 % topical gel Apply to: Skin on  Both/All Knee for pain.  Topical 1% gel, 4 g applied TOPICALLY to lower extremities 3 times daily PRN ;    docusate sodium (COLACE) 100 mg, Oral, 2 TIMES DAILY PRN    ferrous sulfate (CASSANDRA-IN-SOL) 15 mg, Oral, DAILY    furosemide (LASIX) 40 mg, Oral, DAILY    gabapentin (NEURONTIN) 800 MG tablet 2 tablets, Oral, 2 TIMES DAILY    hydrocortisone 2.5 % cream Topical, PRN    lidocaine (XYLOCAINE) 5 % external ointment 1 Application., Topical, PRN    naloxone (NARCAN) 4 mg, Alternating Nostrils, PRN, every 2-3 minutes until assistance arrives    oxyBUTYnin (DITROPAN) 5 mg, Oral, 3 TIMES DAILY    oxyCODONE (ROXICODONE) 5 mg, Oral, EVERY 4 HOURS PRN    phenazopyridine (PYRIDIUM) 100 mg, Oral, 3 TIMES DAILY PRN    QUEtiapine (SEROQUEL) 400 mg, Oral, AT BEDTIME    senna-docusate (SENOKOT-S/PERICOLACE) 8.6-50 MG tablet 1 tablet, Oral, 2 TIMES DAILY    sertraline (ZOLOFT) 100 MG tablet 1.5 tablets, Oral, DAILY    tiZANidine (ZANAFLEX) 4 mg, Oral, 3 TIMES DAILY PRN    zolpidem (AMBIEN) 10 mg, Oral, AT BEDTIME PRN             Review of Systems:   Negative unless stated in the HPI.          Physical Exam:     Vitals:    07/21/24 1600 07/21/24 1700 07/21/24 1800 07/21/24 1900   BP: 139/69 135/76 (!) 148/81 122/69   Pulse:       Resp:       Temp:       TempSrc:       SpO2: 100% 100% 100% 99%     General: Alert and oriented in no acute distress, sitting up in bed   CV: Regular rate, well perfused   Resp: Non-labored breathing on room air   GI: Abdomen soft, slightly distended. Generalized tenderness to palpation without rebound or guarding. TINY drain in place with surrounding bandages. Lower abdominal incision with evidence of superficial skin separation and serosanguinous drainage, malodorous; white powder overlying incision consistent with baby powder.   : Suprapubic and kent catheters in place draining orange urine. Evidence of urinary leakage near vaginal vault c/f vesicovaginal fistula; finger in vagina with appreciation of kent bulb; unable to  visualize vaginal cuff with speculum 2/2 patient discomfort/positioning  Extremities: Non-tender, non-erythematous. 1+ bilateral lower extremity edema  Neuro: Moving all extremities without difficulties, no noticeable deficits  Psych: Appropriate mood and affect    Labs/Imaging/Diagnostic Tests     Results for orders placed or performed during the hospital encounter of 07/21/24 (from the past 24 hour(s))   EKG 12 lead   Result Value Ref Range    Systolic Blood Pressure  mmHg    Diastolic Blood Pressure  mmHg    Ventricular Rate 69 BPM    Atrial Rate 69 BPM    NV Interval 138 ms    QRS Duration 72 ms     ms    QTc 428 ms    P Axis 37 degrees    R AXIS -14 degrees    T Axis -7 degrees    Interpretation ECG       Sinus rhythm  Possible Left atrial enlargement  Septal infarct , age undetermined  Possible Lateral infarct , age undetermined  Abnormal ECG  Unconfirmed report - interpretation of this ECG is computer generated - see medical record for final interpretation  Confirmed by - EMERGENCY ROOM, PHYSICIAN ([id number]),  ARJUN CHAUDHARY ([id number]) on 7/21/2024 7:17:58 PM     CBC with platelets differential    Narrative    The following orders were created for panel order CBC with platelets differential.  Procedure                               Abnormality         Status                     ---------                               -----------         ------                     CBC with platelets and d...[[id number]]  Abnormal            Final result                 Please view results for these tests on the individual orders.   Comprehensive metabolic panel   Result Value Ref Range    Sodium 139 135 - 145 mmol/L    Potassium 3.7 3.4 - 5.3 mmol/L    Carbon Dioxide (CO2) 29 22 - 29 mmol/L    Anion Gap 10 7 - 15 mmol/L    Urea Nitrogen 15.7 8.0 - 23.0 mg/dL    Creatinine 0.86 0.51 - 0.95 mg/dL    GFR Estimate 72 >60 mL/min/1.73m2    Calcium 8.3 (L) 8.8 - 10.4 mg/dL    Chloride 100 98 - 107 mmol/L    Glucose 105 (H) 70 -  99 mg/dL    Alkaline Phosphatase 66 40 - 150 U/L    AST 12 0 - 45 U/L    ALT 5 0 - 50 U/L    Protein Total 6.0 (L) 6.4 - 8.3 g/dL    Albumin 2.5 (L) 3.5 - 5.2 g/dL    Bilirubin Total 0.4 <=1.2 mg/dL   Lipase   Result Value Ref Range    Lipase 53 13 - 60 U/L   CBC with platelets and differential   Result Value Ref Range    WBC Count 12.7 (H) 4.0 - 11.0 10e3/uL    RBC Count 3.64 (L) 3.80 - 5.20 10e6/uL    Hemoglobin 8.5 (L) 11.7 - 15.7 g/dL    Hematocrit 27.8 (L) 35.0 - 47.0 %    MCV 76 (L) 78 - 100 fL    MCH 23.4 (L) 26.5 - 33.0 pg    MCHC 30.6 (L) 31.5 - 36.5 g/dL    RDW 23.9 (H) 10.0 - 15.0 %    Platelet Count 474 (H) 150 - 450 10e3/uL    % Neutrophils 87 %    % Lymphocytes 6 %    % Monocytes 6 %    % Eosinophils 0 %    % Basophils 0 %    % Immature Granulocytes 1 %    NRBCs per 100 WBC 0 <1 /100    Absolute Neutrophils 11.0 (H) 1.6 - 8.3 10e3/uL    Absolute Lymphocytes 0.8 0.8 - 5.3 10e3/uL    Absolute Monocytes 0.8 0.0 - 1.3 10e3/uL    Absolute Eosinophils 0.0 0.0 - 0.7 10e3/uL    Absolute Basophils 0.0 0.0 - 0.2 10e3/uL    Absolute Immature Granulocytes 0.1 <=0.4 10e3/uL    Absolute NRBCs 0.0 10e3/uL   Bruno Draw    Narrative    The following orders were created for panel order Bruno Draw.  Procedure                               Abnormality         Status                     ---------                               -----------         ------                     Extra Red Top Tube[[id number]]                               Final result                 Please view results for these tests on the individual orders.   Extra Red Top Tube   Result Value Ref Range    Hold Specimen JIC    UA with Microscopic reflex to Culture    Specimen: Urine, Catheter   Result Value Ref Range    Color Urine Orange (A) Colorless, Straw, Light Yellow, Yellow    Appearance Urine Cloudy (A) Clear    Glucose Urine Negative Negative mg/dL    Bilirubin Urine Negative Negative    Ketones Urine 10 (A) Negative mg/dL    Specific Gravity Urine  1.009 1.003 - 1.035    Blood Urine Large (A) Negative    pH Urine 6.5 5.0 - 7.0    Protein Albumin Urine 100 (A) Negative mg/dL    Urobilinogen Urine Normal Normal, 2.0 mg/dL    Nitrite Urine Positive (A) Negative    Leukocyte Esterase Urine Large (A) Negative    Bacteria Urine Many (A) None Seen /HPF    WBC Clumps Urine Present (A) None Seen /HPF    RBC Urine 84 (H) <=2 /HPF    WBC Urine >182 (H) <=5 /HPF    Squamous Epithelials Urine 8 (H) <=1 /HPF    Narrative    Urine Culture ordered based on laboratory criteria   UA with Microscopic reflex to Culture    Specimen: Urine, Catheter   Result Value Ref Range    Color Urine Orange (A) Colorless, Straw, Light Yellow, Yellow    Appearance Urine Cloudy (A) Clear    Glucose Urine Negative Negative mg/dL    Bilirubin Urine Negative Negative    Ketones Urine 10 (A) Negative mg/dL    Specific Gravity Urine 1.010 1.003 - 1.035    Blood Urine Large (A) Negative    pH Urine 6.5 5.0 - 7.0    Protein Albumin Urine 100 (A) Negative mg/dL    Urobilinogen Urine Normal Normal, 2.0 mg/dL    Nitrite Urine Positive (A) Negative    Leukocyte Esterase Urine Large (A) Negative    Bacteria Urine Many (A) None Seen /HPF    WBC Clumps Urine Present (A) None Seen /HPF    RBC Urine 84 (H) <=2 /HPF    WBC Urine >182 (H) <=5 /HPF    Squamous Epithelials Urine 3 (H) <=1 /HPF    Narrative    Urine Culture ordered based on laboratory criteria   CT Abdomen Pelvis w Contrast    Narrative    EXAMINATION: CT ABDOMEN PELVIS W CONTRAST, 7/21/2024 6:27 PM    TECHNIQUE:  Helical CT images from the thoracic inlet through the  symphysis pubis were obtained with contrast. Contrast dose: 109ml  isovue 370    COMPARISON: CT chest abdomen and pelvis 5/3/2024, multiple priors.    HISTORY: Recent hysterectomy oophorectomy and surgery, new nausea and  vomiting leukocytosis evaluate for pelvic infection, bowel obstruction    FINDINGS:    Lower chest:  Partially visualized heart unremarkable. Visualized lung  bases  unremarkable. Calcific density of the left lung base.    Abdomen and pelvis:  Liver: Stable 8 mm hypoattenuating hepatic cyst in the left lobe.  Remainder of liver unremarkable.  Biliary System: No intrahepatic or extrahepatic biliary ductal  dilation. Minimally distended gallbladder, no wall thickening. No  radiopaque stones.  Pancreas: Mildly prominent pancreatic duct without definitive  dilation. Otherwise unremarkable.  Adrenal glands: Unremarkable  Spleen: Multiple calcified splenic granulomas, otherwise unremarkable.  Kidneys: Improved, now mild bilateral hydronephrosis. Symmetric  slightly heterogeneous nephrogram, no stones. Mild reactive urothelial  enhancement intermittently along the ureters otherwise unremarkable.   Pelvis: Surgical changes of abdominal hysterectomy and bilateral  salpingo-oophorectomy. Shahid catheter and suprapubic catheter seen  within the bladder with balloons inflated. Multiple foci of air are  seen within and surrounding the bladder, there are diffuse  inflammatory changes surrounding the bladder. The bladder is not  well-defined, collapsed. The wall posteriorly to the right of midline,  series 4 image 307 is not well visualized with a small amount of fluid  and air in this region.    Gastrointestinal tract: Surgical changes of Kiko-en-Y gastric bypass,  unchanged small hiatal hernia with some fluid in the esophagus.  Multiple fluid-filled loops of small and large bowel, fluid and stool  is seen down to level of the rectum. Pseudothickening appearance of  several loops of small bowel due to decompression, decompressed ileum  down to the level of the IC junction. In the context of possible early  obstruction, transition point may be seen in the anterior abdomen  (sagittal series 7 image 80) and coronal series 4 images 223 through  258. Small bowel loops downstream from this region are collapsed to  the ileocecal valve however there is still stool and fluid throughout  the  colon as discussed above. Reactive changes in pelvic bowel loops  noted.    Mesentery/peritoneum/retroperitoneum: Inflammatory changes without  definitive organized fluid collection in the pelvis. Scattered foci of  intra-abdominal air, noted along the liver (series 3 image 16), for  example. Several foci of air within the pelvis, multiple which appear  external to the bladder in the right hemipelvis (series 3 image 64).  Peroneal thickening along the anterior low abdomen. Perirectal and  presacral edema.    Lymph nodes: Multiple enlarged reactive retroperitoneal lymph nodes,  and no mesenteric adenopathy.  Vasculature: Patent abdominal aorta, nonaneurysmal, patent branch  vasculature. The portal vein, splenic vein, and superior mesenteric  vein are patent without obstruction.    Bones: No acute fractures, no high-grade osseous lesions. Degenerative  changes of the lumbar spine unchanged grade 1 anterolisthesis L3 on  L4, and grade 2 anterolisthesis of L4 on L5. Multilevel disc height  loss with vacuum disc phenomenon. Posterior spondylosis, moderate to  severe.    Soft Tissues: Soft tissue anasarca along the anterior low abdomen,  soft tissue gas noted at the site of the suprapubic catheter entrance.  No organized soft tissue fluid collection. Anasarca along the  bilateral hips.        Impression    IMPRESSION:   1. Surgical changes of hysterectomy and bilateral  salpingo-oophorectomy. Ill-defined inflammatory changes throughout the  low pelvis without definitive organized fluid collection such as  abscess identified.  2. Multiple foci of air seen within the abdomen, this may be due to  the recent surgery or the percutaneous abdominal drain.  3. Multiple fluid-filled loops of bowel. Fluid-filled colon is seen  down to level of the rectum suggestive of slow motility. However the  distal ileum is decompressed with possible transition point in the  anterior lower abdomen as detailed above. May represent mixed  etiology  of ileus with partial obstruction. Radiographic follow-up suggested.  4. Improved now mild bilateral hydronephrosis. Slightly heterogeneous  enhancement of the kidneys. Mild urothelial enhancement of the  ureters, this is likely reactive.   5. Multiple enlarged retroperitoneal lymph nodes which are likely  reactive.  6. Soft tissue anasarca. There is also edema involving the mesentery  and pelvic soft tissues.  7. Soft tissue gas and inflammatory changes within the abdominal wall  at the site of the suprapubic catheter, no organized fluid collection  at this location.   8. Bladder is collapsed with Shahid catheter and suprapubic catheter in  place. 9. Bladder wall is difficult to discern which may be due to  adjacent inflammatory changes and timing of contrast bolus which is  slightly early. Possible defect along the right posterior bladder wall  with adjacent fluid and air. Of note, a bladder repair was made during  surgery. Could consider follow-up CT cystogram to evaluate for any  active leak in this region.      I have personally reviewed the examination and initial interpretation  and I agree with the findings.    FORREST RAMIREZ MD         SYSTEM ID:  [id number]          Assessment and Plan:   Assessment & Plan: This patient is a 69 year old female with serous uterine carcinoma who is POD#9 following exploratory laparotomy, total abdominal hysterectomy, bilateral salpingo-oophorectomy, and lysis of adhesions with Dr. Perez on 7/12/24. Her operation was complicated by a cystotomy which was repaired at the time of her operation with subsequent insertion of a supra-pubic catheter. 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 Presentation most consistent with small bowel obstruction with CT of the abdomen and pelvis remains in process. Physical examination findings also significant for evidence of likely surgical site infection at the level of her lower abdominal incision and evidence of possible vesicovaginal fistula given appreciation of kent bulb on attempted bimanual examination as well as patient reported ongoing dampness from below despite suprapubic and kent catheter placement.     #R/out SBO  Leading diagnosis given ongoing nausea, vomiting and inability to have a bowel movement since discharge from the hospital in the setting of recent post-operative state.   - CT abdomen pelvis ordered to assess for evidence of transition point, final read pending at this time  - NPO w/ mIVF   - Consider placement of NG tube if symptoms do not improve with bowel rest     #Endometrial serous carcinoma  - Patient previously scheduled to follow-up with Dr. Perez on 7/30/24 to discuss next steps; will reschedule and/or discuss next steps while inpatient     #Suprapubic catheter  #UTI   #C/f Vesicovaginal fistula  - Suprapubic catheter placed at the time of surgery to be kept in place until patient sees urology, consider consult inpatient (patient was to see them 8/7/24)  - UA with large leukocyte esterase and nitrite positivity; will initiate on Unasyn; urine cultures remain in process and will narrow therapies following results   - Physical examination concerning for possible vesicovaginal fistula given evidence of possible urinary leakage around kent and ability to palpate structure that felt like kent bulb, however, bimanual examination technically challenging given patient positioning, discomfort, and known vaginal adhesions 2/2 to prior pelvic radiation    #Surgical site infection  - Physical examination with evidence of superficial skin  separation and serosanguinous drainage around lower abdominal incision  - Plan to probe incision to assess for possible fascial involvement and/or dehiscence  - Initiate on Unasyn as above     #Chronic pain  #Osteoarthritis  - Scheduled tylenol, PRN oxycodone    #Hypertension  - Continue PTA losartan, amlodipine, atenolol, and lasix     #Atrial fibrillation  #Chronic anticoagulation  - Patient currently in normal sinus rhythm   - Hold PTA apixaban     #Paranoid schizophrenia  - Continue PTA Seroquel, Zoloft, cogentin, vistaril, and Ambien     #History of gastric bypass  - Avoid NSAIDs    Neisha Morris MD  Obstetrics and Gynecology, PGY-2  07/21/24 8:34 PM            Attending Attestation  I agree with the history and plan as documented in the note above, which I have edited as necessary.     Santos Zamorano MD    Gynecologic Oncology

## 2024-07-21 NOTE — ED TRIAGE NOTES
BIBA from [facility]. Hysterectomy on 7/12, reports nausea/vomiting since procedure. No BM ~4 days. Bs 122. Shahid catheter in place.     Triage Assessment (Adult)       Row Name 07/21/24 1500          Triage Assessment    Airway WDL WDL        Respiratory WDL    Respiratory WDL WDL        Skin Circulation/Temperature WDL    Skin Circulation/Temperature WDL WDL        Cardiac WDL    Cardiac WDL WDL        Peripheral/Neurovascular WDL    Peripheral Neurovascular WDL WDL        Cognitive/Neuro/Behavioral WDL    Cognitive/Neuro/Behavioral WDL WDL

## 2024-07-22 ENCOUNTER — APPOINTMENT (OUTPATIENT)
Dept: OCCUPATIONAL THERAPY | Facility: CLINIC | Age: 69
DRG: 863 | End: 2024-07-22
Payer: COMMERCIAL

## 2024-07-22 ENCOUNTER — APPOINTMENT (OUTPATIENT)
Dept: PHYSICAL THERAPY | Facility: CLINIC | Age: 69
DRG: 863 | End: 2024-07-22
Payer: COMMERCIAL

## 2024-07-22 LAB
GLUCOSE BLDC GLUCOMTR-MCNC: 100 MG/DL (ref 70–99)
GLUCOSE BLDC GLUCOMTR-MCNC: 173 MG/DL (ref 70–99)
GLUCOSE BLDC GLUCOMTR-MCNC: 80 MG/DL (ref 70–99)
GLUCOSE BLDC GLUCOMTR-MCNC: 94 MG/DL (ref 70–99)
GLUCOSE BLDC GLUCOMTR-MCNC: 95 MG/DL (ref 70–99)

## 2024-07-22 PROCEDURE — 97165 OT EVAL LOW COMPLEX 30 MIN: CPT | Mod: GO

## 2024-07-22 PROCEDURE — 250N000013 HC RX MED GY IP 250 OP 250 PS 637: Performed by: NURSE PRACTITIONER

## 2024-07-22 PROCEDURE — 97161 PT EVAL LOW COMPLEX 20 MIN: CPT | Mod: GP | Performed by: PHYSICAL THERAPIST

## 2024-07-22 PROCEDURE — 97116 GAIT TRAINING THERAPY: CPT | Mod: GP | Performed by: PHYSICAL THERAPIST

## 2024-07-22 PROCEDURE — 250N000013 HC RX MED GY IP 250 OP 250 PS 637: Performed by: OBSTETRICS & GYNECOLOGY

## 2024-07-22 PROCEDURE — 120N000002 HC R&B MED SURG/OB UMMC

## 2024-07-22 PROCEDURE — 250N000011 HC RX IP 250 OP 636: Performed by: OBSTETRICS & GYNECOLOGY

## 2024-07-22 PROCEDURE — 99232 SBSQ HOSP IP/OBS MODERATE 35: CPT | Mod: 24 | Performed by: OBSTETRICS & GYNECOLOGY

## 2024-07-22 PROCEDURE — 97530 THERAPEUTIC ACTIVITIES: CPT | Mod: GP | Performed by: PHYSICAL THERAPIST

## 2024-07-22 PROCEDURE — 97535 SELF CARE MNGMENT TRAINING: CPT | Mod: GO

## 2024-07-22 RX ADMIN — HYDROMORPHONE HYDROCHLORIDE 0.2 MG: 0.2 INJECTION, SOLUTION INTRAMUSCULAR; INTRAVENOUS; SUBCUTANEOUS at 00:22

## 2024-07-22 RX ADMIN — OXYBUTYNIN CHLORIDE 5 MG: 5 TABLET ORAL at 15:42

## 2024-07-22 RX ADMIN — ATENOLOL 100 MG: 50 TABLET ORAL at 08:57

## 2024-07-22 RX ADMIN — DICLOFENAC SODIUM 2 G: 10 GEL TOPICAL at 21:09

## 2024-07-22 RX ADMIN — DICLOFENAC SODIUM 2 G: 10 GEL TOPICAL at 09:08

## 2024-07-22 RX ADMIN — ERTAPENEM SODIUM 1 G: 1 INJECTION, POWDER, LYOPHILIZED, FOR SOLUTION INTRAMUSCULAR; INTRAVENOUS at 21:06

## 2024-07-22 RX ADMIN — FUROSEMIDE 40 MG: 40 TABLET ORAL at 08:56

## 2024-07-22 RX ADMIN — OXYBUTYNIN CHLORIDE 5 MG: 5 TABLET ORAL at 21:08

## 2024-07-22 RX ADMIN — PROCHLORPERAZINE EDISYLATE 5 MG: 5 INJECTION INTRAMUSCULAR; INTRAVENOUS at 00:22

## 2024-07-22 RX ADMIN — DICLOFENAC SODIUM 2 G: 10 GEL TOPICAL at 18:18

## 2024-07-22 RX ADMIN — HYDROMORPHONE HYDROCHLORIDE 0.2 MG: 0.2 INJECTION, SOLUTION INTRAMUSCULAR; INTRAVENOUS; SUBCUTANEOUS at 12:17

## 2024-07-22 RX ADMIN — OXYBUTYNIN CHLORIDE 5 MG: 5 TABLET ORAL at 08:56

## 2024-07-22 RX ADMIN — HYDROMORPHONE HYDROCHLORIDE 0.2 MG: 0.2 INJECTION, SOLUTION INTRAMUSCULAR; INTRAVENOUS; SUBCUTANEOUS at 23:24

## 2024-07-22 RX ADMIN — APIXABAN 2.5 MG: 2.5 TABLET, FILM COATED ORAL at 21:08

## 2024-07-22 RX ADMIN — APIXABAN 2.5 MG: 2.5 TABLET, FILM COATED ORAL at 08:57

## 2024-07-22 RX ADMIN — HYDROMORPHONE HYDROCHLORIDE 0.2 MG: 0.2 INJECTION, SOLUTION INTRAMUSCULAR; INTRAVENOUS; SUBCUTANEOUS at 18:10

## 2024-07-22 ASSESSMENT — ACTIVITIES OF DAILY LIVING (ADL)
ADLS_ACUITY_SCORE: 46
ADLS_ACUITY_SCORE: 46
DEPENDENT_IADLS:: CLEANING;COOKING;LAUNDRY;TRANSPORTATION;MEAL PREPARATION
ADLS_ACUITY_SCORE: 46
ADLS_ACUITY_SCORE: 46
ADLS_ACUITY_SCORE: 47
ADLS_ACUITY_SCORE: 46
ADLS_ACUITY_SCORE: 47
ADLS_ACUITY_SCORE: 47
ADLS_ACUITY_SCORE: 38
ADLS_ACUITY_SCORE: 47
ADLS_ACUITY_SCORE: 40
ADLS_ACUITY_SCORE: 46
ADLS_ACUITY_SCORE: 47
ADLS_ACUITY_SCORE: 46
ADLS_ACUITY_SCORE: 40
ADLS_ACUITY_SCORE: 47
ADLS_ACUITY_SCORE: 46
ADLS_ACUITY_SCORE: 47
ADLS_ACUITY_SCORE: 46
ADLS_ACUITY_SCORE: 47
ADLS_ACUITY_SCORE: 38

## 2024-07-22 NOTE — PROGRESS NOTES
07/22/24 1500   Appointment Info   Signing Clinician's Name / Credentials (PT) NUVIA Mac   Student Supervision Direct Patient Contact Provided;Therapy services provided with the co-signing licensed therapist guiding and directing the services, and providing the skilled judgement and assessment throughout the session   Living Environment   People in Home grandchild(vernell);child(vernell), adult   Current Living Arrangements house   Home Accessibility stairs to enter home   Number of Stairs, Main Entrance 2;5   Stair Railings, Main Entrance none;railings on both sides of stairs   Transportation Anticipated family or friend will provide   Living Environment Comments Pt reports living in house with many adult family members who assist her. Has shower chair   Self-Care   Usual Activity Tolerance moderate   Current Activity Tolerance fair   Regular Exercise No   Equipment Currently Used at Home cane, straight;walker, standard;shower chair   Fall history within last six months no   Activity/Exercise/Self-Care Comment Pt reports receives assist for  bathing, occasionally receives assist for LB dressing. Reports family can assist at discharge.   General Information   Onset of Illness/Injury or Date of Surgery 07/21/24   Referring Physician Santos Zamorano MD   Patient/Family Therapy Goals Statement (PT) Return home   Pertinent History of Current Problem (include personal factors and/or comorbidities that impact the POC) Pt is a 69 year old POD#10 s/p XL, SNEHA, BSO, lysis of adhesions complicated by cystotomy w/ suprapubic catheter placement in the setting of 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   Existing Precautions/Restrictions abdominal;fall   General Observations Pt needing a lot of encouraging to participate in therapy   Cognition   Affect/Mental Status (Cognition) WFL   Orientation Status (Cognition)   (not  formally assessed)   Follows Commands (Cognition) WFL   Pain Assessment   Patient Currently in Pain Yes, see Vital Sign flowsheet   Posture    Posture Forward head position;Protracted shoulders  (flexed trunk)   Range of Motion (ROM)   ROM Comment Not formally assessed   Strength (Manual Muscle Testing)   Strength Comments Not formally assessed, grossly >3/5 per observation of functional mobility   Bed Mobility   Comment, (Bed Mobility) SBA with supine>sit EOB   Transfers   Comment, (Transfers) CGA with sit>stand, momentum needed and head over toes   Gait/Stairs (Locomotion)   Comment, (Gait/Stairs) Ambulated 5' with CGA and FWW, flexed trunk, short step length, decreased speed, bilateral compensated trendelenberg gait   Balance   Balance Comments Good seated balance, impaired standing and dynamic balance   Clinical Impression   Criteria for Skilled Therapeutic Intervention Yes, treatment indicated   PT Diagnosis (PT) Impaired functional mobility   Influenced by the following impairments Pain, impaired balance, strength, and activity tolerance   Functional limitations due to impairments Bed mobility, transfers, gait. stairs   Clinical Presentation (PT Evaluation Complexity) stable   Clinical Presentation Rationale Clinical judgement   Clinical Decision Making (Complexity) low complexity   Planned Therapy Interventions (PT) balance training;bed mobility training;gait training;home exercise program;neuromuscular re-education;patient/family education;stair training;strengthening;transfer training;progressive activity/exercise;risk factor education;home program guidelines   Risk & Benefits of therapy have been explained evaluation/treatment results reviewed;care plan/treatment goals reviewed;risks/benefits reviewed;patient   PT Total Evaluation Time   PT Eval, Low Complexity Minutes (17702) 5   Physical Therapy Goals   PT Frequency 6x/week   PT Predicted Duration/Target Date for Goal Attainment 07/27/24   PT: Bed  Mobility Independent;Within precautions   PT: Transfers Modified independent;Within precautions   PT: Gait Modified independent;Standard walker;Within precautions;150 feet   PT: Stairs Modified independent;Within precautions;5 stairs;Rail on both sides   Therapeutic Activity   Therapeutic Activities: dynamic activities to improve functional performance Minutes (68998) 25   Symptoms Noted During/After Treatment Fatigue   Treatment Detail/Skilled Intervention Pt greeted supine in bed, initially declined PT 2/2 too many people going in and out of her room. OT went in to give pt a shower and encouraged pt to get up. Pt agreed to PT. Pt performed bed mobility with SBA and verbal cues to scoot to EOB. Pt cued to push up from her bed and not to pull up from FWW. Pt performed sit>stand with FWW and CGA. Pivot transferred to camode. After using camode, pt performed sit>stand with CGA and FWW, pulling up from FWW. Pt ambulated to 6' to bathroom with flexed trunk, short step length, slowed gait speed, and bilateral compensated trendelenberg gait. Once in bathroom, pt pivot transferred to shower chair with CGA and use of grab bar in shower. OT took over from there to assist pt with showering. After OT, pt greeted seated in recliner. Pt performed sit>stand with CGA and FWW. verbal cues needed to push up from chair and not pull on FWW.  After gait, pt transferred from w/c to walk back into room. Sit>stand performed with CGA and FWW with increased momentum to assist with standing. Pt performed transfer into bed with CGA. Verbal cues needed to educate pt on performing log roll into bed. MinAx1 needed at legs to help lift them up into bed. Pt assisted in scooting up higher in bed. Pt left R sidelying in bed with call light and all needs met.   Gait Training   Gait Training Minutes (00277) 15   Symptoms Noted During/After Treatment (Gait Training) fatigue;increased pain   Treatment Detail/Skilled Intervention Pt ambulated 40' with  flexed trunk, short step length, slowed gait speed, and accentuated bilateral compensated trendelenberg gait. Pt ambulated with L forearm on FWW rather than hand on handle. Needed verbal cues to keep both hands on FWW handles. Pt c/o stomach and knee pain with ambulation. After ~40' pt needed seated rest break in w/c.   PT Discharge Planning   PT Plan Bed mobility, transfers, gait, stairs   PT Discharge Recommendation (DC Rec) Transitional Care Facility   PT Rationale for DC Rec Pt presenting below baseline 2/2 recent hospital admission. Pt requiring Ax1 with all mobility and unable to ambulate safe household distances. Current d/c recommendation is TCU. Although if pt and family were to take her home, pt would require 24/7 assist and Ax1-2 for all mobility.   PT Brief overview of current status Ax1 and FWW   Total Session Time   Timed Code Treatment Minutes 40   Total Session Time (sum of timed and untimed services) 45

## 2024-07-22 NOTE — PLAN OF CARE
Goal Outcome Evaluation:  Pt transferred from ED 2200. A&O, VSS on RA, afebrile. Nausea intermittent, compazine effective. Denies emesis or SOB. Pain managed with repositioning, heat/ice packs, and PRN dilaudid x1.1 loose BM, passing flatus. NPO, ice chips. Mild BLE edema. Suprapubic catheter and TINY dressings changed. TINY with serous drainage.

## 2024-07-22 NOTE — PROGRESS NOTES
07/22/24 1542   Appointment Info   Signing Clinician's Name / Credentials (OT) TRAVIS Cortés   Student Supervision Line of sight supervision provided   Living Environment   People in Home grandchild(vernell);child(vernell), adult   Current Living Arrangements house   Home Accessibility stairs to enter home   Number of Stairs, Main Entrance 2;5   Stair Railings, Main Entrance none;railings on both sides of stairs   Transportation Anticipated family or friend will provide   Living Environment Comments Pt reports living in house with many adult family members who assist her. Pt reported needing Ax2 for getting out of the car, getting into the house, and transfers. Has shower chair and tub shower.   Self-Care   Usual Activity Tolerance moderate   Current Activity Tolerance fair   Regular Exercise No   Equipment Currently Used at Home cane, straight;walker, standard;shower chair   Fall history within last six months no   Activity/Exercise/Self-Care Comment Pt reports receives assist for bathing, occasionally receives assist for LB dressing. Reports family can assist at discharge.   Instrumental Activities of Daily Living (IADL)   IADL Comments Pt reports family completes IADLs including meal prep and cleaning.   General Information   Onset of Illness/Injury or Date of Surgery 07/21/24   Referring Physician Krupa Escobar MD   Patient/Family Therapy Goal Statement (OT) To return home   Additional Occupational Profile Info/Pertinent History of Current Problem Alis Hartman is a 69 year old female past medical history significant for intermittent A-fib anticoagulated on Eliquis, history of recent hospitalization 7/12/2024 through 7/17/2024 with diagnoses of uterine sarcoma bilateral hydro ureter, status post diagnostic laparoscopy converted to exploratory laparotomyWith hysterectomy bilateral salpingo-oophorectomy, lysis of adhesions and repair of cystotomy, insertion of suprapubic catheter 7/12/2024 who presents  emergency department tonight with concerns for abdominal pain and nausea/vomiting.   Existing Precautions/Restrictions abdominal;fall   Left Upper Extremity (Weight-bearing Status) full weight-bearing (FWB)   Right Upper Extremity (Weight-bearing Status) full weight-bearing (FWB)   Left Lower Extremity (Weight-bearing Status) full weight-bearing (FWB)   Right Lower Extremity (Weight-bearing Status) full weight-bearing (FWB)   General Observations and Info Activity Ambulate with assist   Cognitive Status Examination   Orientation Status orientation to person, place and time   Visual Perception   Visual Impairment/Limitations WFL   Sensory   Sensory Quick Adds sensation intact   Posture   Posture forward head position;protracted shoulders   Range of Motion Comprehensive   General Range of Motion no range of motion deficits identified   Strength Comprehensive (MMT)   Comment, General Manual Muscle Testing (MMT) Assessment Not formally assessed 2/2 precautions, observed WFL for ADLs   Muscle Tone Assessment   Muscle Tone Quick Adds No deficits were identified   Coordination   Upper Extremity Coordination No deficits were identified   Bed Mobility   Bed Mobility supine-sit   Supine-Sit [place] (Bed Mobility) minimum assist (75% patient effort)   Comment (Bed Mobility) per clinical judgement   Transfers   Transfers sit-stand transfer;toilet transfer;shower transfer   Sit-Stand Transfer   Sit-Stand [place] (Transfers) contact guard   Assistive Device (Sit-Stand Transfers) walker, 4-wheeled   Shower Transfer   [place] Level (Shower Transfer) minimum assist (75% patient effort)   Shower Transfer Comments Charly x1 to get into shower; minAx2 for out of shower w/ FWW   Toilet Transfer   Type (Toilet Transfer) sit-stand   [place] Level (Toilet Transfer) contact guard   Assistive Device (Toilet Transfer) walker, 4-wheeled   Balance   Balance Assessment no deficits identified   Activities of Daily Living   BADL  Assessment/Intervention bathing;lower body dressing;toileting;grooming   Bathing Assessment/Intervention   [place] Level (Bathing) minimum assist (75% patient effort)   Comment, (Bathing) A for LE and backside   Assistive Devices (Bathing) shower chair   Upper Body Dressing Assessment/Training   [place] Level (Upper Body Dressing) set up   Comment, (Upper Body Dressing) Per clincial judgement   Lower Body Dressing Assessment/Training   [place] Level (Lower Body Dressing) dependent (less than 25% patient effort)   Comment, (Lower Body Dressing) don/dof socks   Grooming Assessment/Training   [place] Level (Grooming) set up   Comment, (Grooming) Per clinical judgement   Toileting   [place] Level (Toileting) minimum assist (75% patient effort)   Comment, (Toileting) min A x2   Clinical Impression   Criteria for Skilled Therapeutic Interventions Met (OT) Yes, treatment indicated   OT Diagnosis Decreased safety/engagment in ADLs/IADLs now with post surgical precautions   OT Problem List-Impairments impacting ADL problems related to;activity tolerance impaired;mobility;strength;pain;post-surgical precautions   Assessment of Occupational Performance 3-5 Performance Deficits   Identified Performance Deficits LB dressing, bathing, toileting, functional mobility/transfers   Planned Therapy Interventions (OT) ADL retraining;IADL retraining;transfer training;home program guidelines;progressive activity/exercise;strengthening;bed mobility training   Clinical Decision Making Complexity (OT) problem focused assessment/low complexity   Risk & Benefits of therapy have been explained evaluation/treatment results reviewed;care plan/treatment goals reviewed;risks/benefits reviewed;current/potential barriers reviewed;participants voiced agreement with care plan;participants included;patient   Clinical Impression Comments Pt will benefit from continued skilled OT services to progress IND and safety with  ADLs/IADLs.   OT Total Evaluation Time   OT Eval, Low Complexity Minutes (75232) 5   OT Goals   Therapy Frequency (OT) Daily   OT Predicted Duration/Target Date for Goal Attainment 08/02/24   OT Goals Hygiene/Grooming;Upper Body Dressing;Lower Body Dressing;Upper Body Bathing;Lower Body Bathing;Toilet Transfer/Toileting   OT: Hygiene/Grooming modified independent   OT: Upper Body Dressing Modified independent;within precautions   OT: Lower Body Dressing Minimal assist;within precautions   OT: Upper Body Bathing Modified independent;within precautions   OT: Lower Body Bathing Modified independent;with precautions   OT: Toilet Transfer/Toileting Modified independent;within precautions   Self-Care/Home Management   Self-Care/Home Mgmt/ADL, Compensatory, Meal Prep Minutes (80599) 45   Symptoms Noted During/After Treatment (Meal Preparation/Planning Training) fatigue   Treatment Detail/Skilled Intervention OT eval completed and treatment initiated. Co-treat w/ PT for first 10 mins of session. Pt greeted lying supine in bed, pt agreeable to therapy session with encouragment. Facilitated functional mobility for increased activity tolerance with ind w/ ADLs. Pt required Charly for bed mobility, pt utilized log roll technique. Pt was dependent for donning socks. Pt requested needing to use commode. Pt completed STS with CGA and FWW. Pt took a few steps to commode and sat down with CGA. Pt completed STS with CGA and FWW and walked into bathroom to shower. Pt completed transfer into shower with Charly x1 . Pt sat on shower chair and was able to scoot self further on utilizing grab bars. Th assisted with line mngmt and doffing gown and socks. Pt required Charly for bathing, pt tolerated standing for ~2 minutes while simulatenously wiping bottom and vaginal area. Pt required A for washing LEs and back. Pt required Charly for drying off, LEs and back. Pt was dependent for donning socks. Pt required Charly x2 for transferring out of shower  with FWW.  Pt walked to recliner w/ FWW and CGA. Pt left sitting in recliner with all needs met, VSS on RA.   OT Discharge Planning   OT Plan standing ADLs, toilet transfer in bathroom, LB dressing   OT Discharge Recommendation (DC Rec) Transitional Care Facility   OT Rationale for DC Rec Pt below functional baseline. Pt would benefit from TCU as pt stated she needs Ax2 at home. Pt would benefit from TCU to increase strength/endurance to increase ind with I/ADLs.   OT Brief overview of current status Charly for shower, CGA w/ FWW for ambulation   Total Session Time   Timed Code Treatment Minutes 45   Total Session Time (sum of timed and untimed services) 50

## 2024-07-22 NOTE — PROGRESS NOTES
Gynecology Oncology Progress Note  07/22/2024    24 hour events:   - Admit  - Initiation of ertapenem for treatment of UTI, suspected superficial surgical site infection in the setting of prior urine ESBL    Subjective: Patient is doing well.  Pain is well controlled.  Eager to have breakfast. Passing flatus and had bowel movement last night. Not yet ambulating. Has indwelling suprapubic catheter and kent. Denies chest pain, SOB, nausea/vomiting, fevers/chills, dizziness.    Objective:   Patient Vitals for the past 24 hrs:   BP Temp Temp src Pulse Resp SpO2   07/21/24 2255 -- -- -- -- -- 100 %   07/21/24 2219 102/65 98.6  F (37  C) Oral 88 18 100 %   07/21/24 1900 122/69 -- -- -- -- 99 %   07/21/24 1800 (!) 148/81 -- -- -- -- 100 %   07/21/24 1700 135/76 -- -- -- -- 100 %   07/21/24 1600 139/69 -- -- -- -- 100 %   07/21/24 1446 127/72 97.8  F (36.6  C) Oral 72 18 100 %     General: NAD, comfortable  Cardiovascular: regular rate and rhythm  Respiratory: normal rate and work of breathing on room air  Abdomen: soft, appropriately tender, non-distended  Incision: superficial dehiscence with malodorous pus  Extremities: warm, well-perfused, nontender, SCDs in place      Assessment/Plan: 69 year old POD#10 s/p XL, SNEHA, BSO, lysis of adhesions complicated by cystotomy w/ suprapubic catheter placement in the setting of 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     # C/f small bowel obstruction  -Pt initially with nausea and emesis, however this has resolved.  Will allow for general diet this AM as pt reports she is hungry and has no nausea.  At presentation, patient reported ongoing nausea, vomiting and inability to have a     # Endometrial serous carcinoma  - Scheduled to follow-up with Dr. Perez on 7/30/24 to discuss chemotherapy     # Suprapubic catheter  # UTI   # Possible bladder lleak vs vesico-vaginal fistula  -  UA highly concerning for UTI.  Culture pending but currently on empiric ertapenem given previous ESBL.  Await final culture/sensitivities for final ABx plan  -Will discuss with urology if further evaluation of possible bladder leak/fistula is required at this time.  Given her bladder is decompressed and her catheters are draining well will defer PNT placement at this time.    # Superficial surgical site infection  - Wound culture pending.  On ertapenem for UTI which should have adequate coverage for SSI as well.     # Chronic pain  # Osteoarthritis  - Scheduled tylenol, PRN oxycodone     # Hypertension  - Continue PTA atenolol and lasix      # Atrial fibrillation  # Chronic anticoagulation  - Patient currently in normal sinus rhythm   - PTA apixaban      # Paranoid schizophrenia  - Continue PTA Seroquel, Zoloft, cogentin, and Ambien      # History of gastric bypass  - Avoid NSAIDs    Code: Full  PPx: SCDs, PTA apixaban  Dispo: pending clinical course    Please page the Gynecology Oncology team [phone] with questions or concerns.    Nakita Mcfarlane MD  Obstetrics & Gynecology, PGY-3  7/22/2024 6:18 AM        I, Corby Webb MD personally examined and evaluated this patient on 07/22/24.  I discussed the patient with the resident and care team, and agree with the assessment and plan of care as documented in the residents note above.    I personally reviewed vital signs, laboratory values and imaging results.    Pt with UTI, SSI and concern for ileus.  Ileus seems to be resolved and will advance diet this AM.  On empiric ertapenem for previous ESBL and await culture results for final ABx plan.  Will consult with urology regarding concern for persistent bladder injury vs fistula and need for any other diversion (PNT?).    Lilliam Webb MD  Gynecologic Oncology  [facility]

## 2024-07-22 NOTE — DISCHARGE SUMMARY
"Gynecologic Oncology Discharge Summary    Alis \"Aranza\" AMINAH Hartman  [id number]    Admit Date: 7/21/2024  Discharge Date: 7/26/2024  Admitting Provider: Santos Zamorano MD  Discharge Provider: Lilliam Tavarez MD    Admission Dx:   -Lower abdominal pain  -Nausea and vomiting  -Constipation  -Concern for SBO  -Endometrial serous carcinoma  -Cervical cancer, s/p radiation therapy  -Suprapubic catheter  -UTI  -History of ESBL  -C/f vesicovaginal fistula  -Surgical site infection  -Chronic pain  -Osteoarthritis  -HTN  -CKD  -AFib on chronic anticoagulation  -Paranoid schizophrenia  -Hx of gastric bypass  -Hypokalemia  -Hypomagnesia   -Acute on Chronic Anemia    Discharge Dx:  -Lower abdominal pain, improved  -Nausea and vomiting, resolved  -Constipation, resolved  -C/f SBO, resolved  -Endometrial serous carcinoma  -Cervical cancer, s/p radiation therapy  -Suprapubic catheter  -UTI, resolving  -History of ESBL  -Surgical site infection, resolving   -Chronic pain  -Osteoarthritis  -HTN  -CKD  -AFib on chronic anticoagulation  -Paranoid schizophrenia  -Hx of gastric bypass  -Hypokalemia, resolved  -Hypomagnesia, resolved  -Chronic Anemia  -Yaa-rectum skin breakdown     Patient Active Problem List   Diagnosis    HSIL on Pap smear    Cervical cancer (H)    History of urinary tract infection    Vaginal dysplasia    VAIN III (vaginal intraepithelial neoplasia III)    NO SHOW    Carpal tunnel syndrome    Chronic low back pain    Gastric bypass status for obesity    Generalized anxiety disorder    Iron deficiency anemia    Knee pain    Opiate dependence, continuous (H)    Obesity    Osteoarthrosis    Paranoid schizophrenia, chronic condition (H)    Peripheral edema    Vitamin B12 deficiency    Vitamin D deficiency    Stage 3a chronic kidney disease (H)    Anemia due to chronic kidney disease    Thrombocytosis    Benign essential hypertension    Pre-operative cardiovascular examination    Paroxysmal atrial fibrillation " (H)    S/P hysterectomy    Lower abdominal pain    Acute cystitis without hematuria     Procedures:   - None    Prior to Admission Medications:  Medications Prior to Admission   Medication Sig Dispense Refill Last Dose    acetaminophen (TYLENOL) 325 MG tablet Take 2 tablets (650 mg) by mouth every 6 hours as needed for mild pain 24 tablet 0 Past Week at unknown    albuterol (PROAIR HFA/PROVENTIL HFA/VENTOLIN HFA) 108 (90 Base) MCG/ACT inhaler Inhale 1-2 puffs into the lungs every 4 hours as needed for shortness of breath   Unknown at unknown    apixaban ANTICOAGULANT (ELIQUIS) 2.5 MG tablet Take 1 tablet (2.5 mg) by mouth 2 times daily 20 tablet 0 7/21/2024 at am    atenolol (TENORMIN) 100 MG tablet TAKE 1 TABLET(100 MG) BY MOUTH DAILY FOR HIGH BLOOD PRESSURE   7/21/2024 at am    benztropine (COGENTIN) 1 MG tablet Take 1 tablet by mouth daily   7/21/2024 at am    calcium Citrate-vitamin D 500-400 MG-UNIT CHEW Take 1 tablet by mouth daily   7/21/2024 at am    childrens multivitamin w/iron (FLINTSTONES COMPLETE) 60 MG chewable tablet Take 2 tablets by mouth daily   7/21/2024 at am    cholecalciferol 125 MCG (5000 UT) CAPS Take 5000 mg 4 times a week   7/21/2024 at am    cyanocobalamin (CYANOCOBALAMIN) 1000 MCG/ML injection Inject 1 mL (1,000 mcg) into a muscle every month.   More than a month at unknown    diclofenac (VOLTAREN) 1 % topical gel Apply to: Skin on  Both/All Knee for pain. Topical 1% gel, 4 g applied TOPICALLY to lower extremities 3 times daily PRN ;   Past Week at unknown    ferrous sulfate (CASSANDRA-IN-SOL) 75 (15 FE) MG/ML oral drops Take 15 mg by mouth daily   7/21/2024 at am    furosemide (LASIX) 40 MG tablet Take 40 mg by mouth daily   7/21/2024 at am    gabapentin (NEURONTIN) 800 MG tablet Take 2 tablets by mouth 2 times daily   7/21/2024 at am    hydrocortisone 2.5 % cream Apply topically as needed   Unknown at unknown    lidocaine (XYLOCAINE) 5 % external ointment Apply 1 Application topically as  needed   Unknown at unknown    naloxone (NARCAN) 4 MG/0.1ML nasal spray Spray 1 spray (4 mg) into one nostril alternating nostrils as needed for opioid reversal every 2-3 minutes until assistance arrives 2 each 0     oxyBUTYnin (DITROPAN) 5 MG tablet Take 1 tablet (5 mg) by mouth 3 times daily for 13 days 39 tablet 0 7/21/2024 at am    phenazopyridine (PYRIDIUM) 100 MG tablet Take 1 tablet (100 mg) by mouth 3 times daily as needed for urinary tract discomfort 90 tablet 3 Unknown at unknown    QUEtiapine (SEROQUEL) 400 MG tablet Take 400 mg by mouth at bedtime   7/20/2024 at pm    senna-docusate (SENOKOT-S/PERICOLACE) 8.6-50 MG tablet Take 1 tablet by mouth 2 times daily 28 tablet 0 7/21/2024 at am    sertraline (ZOLOFT) 100 MG tablet Take 1.5 tablets by mouth daily   7/21/2024 at am    tiZANidine (ZANAFLEX) 4 MG tablet Take 4 mg by mouth 3 times daily as needed   Unknown at unknown    zolpidem (AMBIEN) 10 MG tablet Take 10 mg by mouth nightly as needed   Past Week at unknown    [DISCONTINUED] docusate sodium (COLACE) 100 MG capsule Take 100 mg by mouth 2 times daily as needed   Past Week at unknown    [DISCONTINUED] oxyCODONE (ROXICODONE) 5 MG tablet Take 1 tablet (5 mg) by mouth every 4 hours as needed for breakthrough pain (pain control or improvement in physical function. Hold dose for analgesic side effects.) 70 tablet 0 7/21/2024 at am     Discharge Medications:     Review of your medicines        START taking        Dose / Directions   methocarbamol 500 MG tablet  Commonly known as: ROBAXIN  Used for: S/P hysterectomy      Dose: 500 mg  Take 1 tablet (500 mg) by mouth every 6 hours as needed for muscle spasms or other (abdominal pain/soreness)  Refills: 0     metroNIDAZOLE 500 MG tablet  Commonly known as: FLAGYL  Indication: Infection of the Skin and/or Soft Tissue  Used for: S/P hysterectomy      Dose: 500 mg  Take 1 tablet (500 mg) by mouth 3 times daily for 6 days  Refills: 0     polyethylene glycol 17  GM/Dose powder  Commonly known as: MIRALAX  Used for: S/P hysterectomy      Dose: 17 g  Take 17 g by mouth 2 times daily as needed for constipation  Refills: 0     sulfamethoxazole-trimethoprim 800-160 MG tablet  Commonly known as: BACTRIM DS  Indication: Infection of the Skin and/or Soft Tissue  Used for: S/P hysterectomy      Dose: 1 tablet  Take 1 tablet by mouth 2 times daily  Refills: 0            CONTINUE these medicines which have NOT CHANGED        Dose / Directions   acetaminophen 325 MG tablet  Commonly known as: TYLENOL  Used for: Malignant neoplasm of overlapping sites of cervix (H)      Dose: 650 mg  Take 2 tablets (650 mg) by mouth every 6 hours as needed for mild pain  Quantity: 24 tablet  Refills: 0     albuterol 108 (90 Base) MCG/ACT inhaler  Commonly known as: PROAIR HFA/PROVENTIL HFA/VENTOLIN HFA      Dose: 1-2 puff  Inhale 1-2 puffs into the lungs every 4 hours as needed for shortness of breath  Refills: 0     apixaban ANTICOAGULANT 2.5 MG tablet  Commonly known as: ELIQUIS  Indication: Atrial Fibrillation Not Caused By A Heart Valve Problem  Used for: S/P hysterectomy, Paroxysmal atrial fibrillation (H), Malignant neoplasm of overlapping sites of cervix (H)      Dose: 2.5 mg  Take 1 tablet (2.5 mg) by mouth 2 times daily  Quantity: 20 tablet  Refills: 0     atenolol 100 MG tablet  Commonly known as: TENORMIN      TAKE 1 TABLET(100 MG) BY MOUTH DAILY FOR HIGH BLOOD PRESSURE  Refills: 0     benztropine 1 MG tablet  Commonly known as: COGENTIN      Dose: 1 tablet  Take 1 tablet by mouth daily  Refills: 0     calcium Citrate-vitamin D 500-400 MG-UNIT Chew      Dose: 1 tablet  Take 1 tablet by mouth daily  Refills: 0     childrens multivitamin w/iron 60 MG chewable tablet      Dose: 2 tablet  Take 2 tablets by mouth daily  Refills: 0     cholecalciferol 125 MCG (5000 UT) Caps      Take 5000 mg 4 times a week  Refills: 0     cyanocobalamin 1000 MCG/ML injection  Commonly known as: CYANOCOBALAMIN       Inject 1 mL (1,000 mcg) into a muscle every month.  Refills: 0     diclofenac 1 % topical gel  Commonly known as: VOLTAREN      Apply to: Skin on  Both/All Knee for pain. Topical 1% gel, 4 g applied TOPICALLY to lower extremities 3 times daily PRN ;  Refills: 0     ferrous sulfate 75 (15 FE) MG/ML oral drops  Commonly known as: CASSANDRA-IN-SOL      Dose: 15 mg  Take 15 mg by mouth daily  Refills: 0     furosemide 40 MG tablet  Commonly known as: LASIX      Dose: 40 mg  Take 40 mg by mouth daily  Refills: 0     gabapentin 800 MG tablet  Commonly known as: NEURONTIN      Dose: 2 tablet  Take 2 tablets by mouth 2 times daily  Refills: 0     hydrocortisone 2.5 % cream      Apply topically as needed  Refills: 0     lidocaine 5 % external ointment  Commonly known as: XYLOCAINE      Dose: 1 Application.  Apply 1 Application topically as needed  Refills: 0     naloxone 4 MG/0.1ML nasal spray  Commonly known as: NARCAN  Used for: Malignant neoplasm of overlapping sites of cervix (H)      Dose: 4 mg  Spray 1 spray (4 mg) into one nostril alternating nostrils as needed for opioid reversal every 2-3 minutes until assistance arrives  Quantity: 2 each  Refills: 0     oxyBUTYnin 5 MG tablet  Commonly known as: DITROPAN  Used for: Bladder spasm      Dose: 5 mg  Take 1 tablet (5 mg) by mouth 3 times daily for 13 days  Quantity: 39 tablet  Refills: 0     oxyCODONE 5 MG tablet  Commonly known as: ROXICODONE  Used for: S/P hysterectomy      Dose: 5 mg  Take 1 tablet (5 mg) by mouth every 4 hours as needed for breakthrough pain (pain control or improvement in physical function. Hold dose for analgesic side effects.)  Quantity: 18 tablet  Refills: 0     phenazopyridine 100 MG tablet  Commonly known as: PYRIDIUM  Used for: Dysuria      Dose: 100 mg  Take 1 tablet (100 mg) by mouth 3 times daily as needed for urinary tract discomfort  Quantity: 90 tablet  Refills: 3     QUEtiapine 400 MG tablet  Commonly known as: SEROquel      Dose: 400  "mg  Take 400 mg by mouth at bedtime  Refills: 0     senna-docusate 8.6-50 MG tablet  Commonly known as: SENOKOT-S/PERICOLACE  Used for: S/P hysterectomy      Dose: 1 tablet  Take 1 tablet by mouth 2 times daily  Quantity: 28 tablet  Refills: 0     sertraline 100 MG tablet  Commonly known as: ZOLOFT      Dose: 1.5 tablet  Take 1.5 tablets by mouth daily  Refills: 0     tiZANidine 4 MG tablet  Commonly known as: ZANAFLEX      Dose: 4 mg  Take 4 mg by mouth 3 times daily as needed  Refills: 0     zolpidem 10 MG tablet  Commonly known as: AMBIEN      Dose: 10 mg  Take 10 mg by mouth nightly as needed  Refills: 0            STOP taking      docusate sodium 100 MG capsule  Commonly known as: COLACE                  Where to get your medicines        Some of these will need a paper prescription and others can be bought over the counter. Ask your nurse if you have questions.    Bring a paper prescription for each of these medications  oxyCODONE 5 MG tablet       Consultations:  WOC, PT/OT, SW, ID    Brief History of Illness:  From H&P:   \"This patient is a 69 year old female with serous uterine carcinoma who is POD#9 following exploratory laparotomy, total abdominal hysterectomy, bilateral salpingo-oophorectomy, and lysis of adhesions with Dr. Perez on 7/12/24. Her operation was complicated by a cystotomy which was repaired at the time of her operation with subsequent insertion of a supra-pubic catheter. 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 She presents to the emergency department for concerns of a small bowel obstruction given new-onset nausea " "and vomiting, inability to pass flatus, and no bowel movement since her discharge from the hospital on 7/17.      Aranza endorses that she has been having multiple episodes of emesis for the past 3 days. Additionally endorses significant decrease in PO intake since this time. She states she was able to eat some solid food up until 2 days ago, but states that yesterday she was only able to tolerate broth and today she hasn't been able to keep anything down. Reports that her emesis is brown in color and malodorous. Denies hematemesis. Reports that she has not had a bowel movement since 7/17, however, states that she was able to \"pass a lot of gas\" earlier this afternoon. She endorses that her both her suprapubic and kent catheters as well as her TINY drain have been having adequate drainage that has not changed since the time of her discharge. Endorses that her low abdominal incision has started to hurt more over the past few days and has been draining some moisture. Reports she has been placing baby powder over it to help keep it dry.      rAanza additionally reports that she has noticed some light vaginal spotting when wiping after using the restroom. Reports that she has additionally been \"damp\" in her vaginal area. She does not endorse any fevers, but states she is \"always chilled\". \"    Hospital Course:  Dz:   - Serous uterine carcinoma s/p XL, SNEHA, BSO, EDIE and cystotomy repair with suprapubic bladder insertion and omental flap on 7/12/24. TINY drain present on admission.  FEN/GI:   - She was made NPO with mIVF upon admission for suspected SBO. She had return of bowel function on HD#2. Her diet was then slowly advanced to regular diet. By discharge, she was tolerating a regular diet without nausea and vomiting and able to maintain her hydration without IVF supplementation.   Pain:   - She has history of chronic pain managed with tylenol and prn oxycodone. Her PTA pain regimen was continued while admitted.  CV:   - " She has chronic paroxysmal atrial fibrillation managed with apixaban and atenolol. Her CV medications were continued while admitted. She had an EKG upon presentation which was SR. Her vitals were within normal limits, excluding asymptomatic mild hypotension. Her vitals were monitored, and she had no acute CV issues.  PULM:   - She has no history of pulmonary issues and did not require any supplemental O2 while admitted. She had no acute pulmonary issues while in house.  HEME:   - She has chronic anemia that was at her baseline by the time of discharge. She had no acute heme issues while in house.  :    - She has a urethral kent and a suprapubc kent that was present on admission. She also a history of Klebsiella pneumoniae ESBL on urine cx in 5/2023. A UA was collected and positive for nitrates and leukocyte esterase. A Ucx was collected. Her urine culture grew Klebsiella. While awaiting culture sensitivities, she was treated empirically with ertapenem (see below). Final urine culture was Klebsiella pneumonia ESBL and was switched to bactrim. She was discharged with both foleys in place.   ID:   - The patient was AF during her hospitalization. She presented with a UTI and a superficial surgical site infection. She was started on ertapenem given history of ESBL infection. Aerobic wound cx grew Klebsiella and proteus. Anaerobic wound cx grew bacteroides. Once her culture sensitivities resulted, she was transitioned from etrapenem to 7 days of bactrim and metronidazole, which she was discharged with.  ENDO:   - No issues.   PSYCH/NEURO:   - She has paranoid schizophrenia managed with seroquel, zoloft, cogentin, and ambien. Her psych medications were continued throughout her admission. No acute psych/neuro issues while admitted.   SKIN:  - She presented with a surgical site infection on admission. See above for cultures and antibioitcs. She also was found to have quang-rectal skin breakdown in the setting of previous  radiation and chronic incontinence. WOC was consulted, recommendations were followed while inpatient and will continue upon discharge.  PPX:    - She was given SCDs and continued on her PTA apixaban during her hospital course. She tolerated the SCDs without incident. The SCDs were discontinued at the time of her discharge.    Discharge Instructions and Follow up:  Ms. Alis Hartman was discharged from the hospital with follow up for ongoing cancer management. She has a follow-up scheduled with Dr. Perez on 7/30.    Discharge Diet: Regular  Discharge Activity: Lifting restricted to 15 pounds, no driving while on narcotics, nothing per vagina for 6 weeks.  Discharge Follow up: 7/30 with Dr. Perez    Discharge Disposition:  Discharged to short-term care facility    Discharge Staff: Jon Mcfarlane MD  Obstetrics & Gynecology, PGY-3  7/26/2024 6:55 AM     Lilliam Webb MD  Gynecologic Oncology  [facility]

## 2024-07-22 NOTE — CONSULTS
Care Management Initial Consult    General Information  Assessment completed with: Patient, VM-chart review,    Type of CM/SW Visit: Chart Assessment  Primary Care Provider verified and updated as needed: Yes   Readmission within the last 30 days: previous discharge plan unsuccessful   Return Category: Exacerbation of disease  Reason for Consult: discharge planning (elevated risk score)  Advance Care Planning: Advance Care Planning Reviewed:  (Gave pt HCD)        Communication Assessment  Patient's communication style: spoken language (English or Bilingual)        Cognitive  Cognitive/Neuro/Behavioral: (P) WDL                      Living Environment:   People in home: grandchild(vernell), child(vernell), adult     Current living Arrangements: house      Able to return to prior arrangements: yes   Pt will discharge to daughters home  [address] [place] [address]    Family/Social Support:  Care provided by: child(vernell)  Provides care for: no one  Marital Status: Single  Children          Description of Support System: Supportive, Involved    Support Assessment: Adequate family and caregiver support, Adequate social supports    Current Resources:   Patient receiving home care services: Yes - Home Care w/[facility]/RN - They met with pt but did not schedule care before pt was readmitted   Community Resources: County Worker  Equipment currently used at home: cane, straight, walker, standard, shower chair  Supplies currently used at home: None    Employment/Financial:  Employment Status: retired      Financial Concerns: none   Referral to Financial Worker: No  Does the patient's insurance plan have a 3 day qualifying hospital stay waiver?  No    Lifestyle & Psychosocial Needs:  Social Determinants of Health     Food Insecurity: No Food Insecurity (4/13/2023)    [facility]    Hunger Vital Sign     Worried About Running Out of Food in the Last Year: Never true     Ran  Out of Food in the Last Year: Never true   Depression: Not at risk (2/28/2024)    PHQ-2     PHQ-2 Score: 2   Housing Stability: Not on file   Tobacco Use: Low Risk  (7/11/2024)    Patient History     Smoking Tobacco Use: Never     Smokeless Tobacco Use: Never     Passive Exposure: Not on file   Financial Resource Strain: Not on file   Alcohol Use: Not on file   Transportation Needs: Not on file   Physical Activity: Not on file   Interpersonal Safety: Not on file   Stress: Not on file   Social Connections: Not on file   Health Literacy: Not on file     Functional Status:  Prior to admission patient needed assistance:   Dependent ADLs:: Independent  Dependent IADLs:: Cleaning, Cooking, Laundry, Transportation, Meal Preparation     Mental Health Status:  Mental Health Status: No Current Concerns       Chemical Dependency Status:  Chemical Dependency Status: No Current Concerns           Values/Beliefs:  Spiritual, Cultural Beliefs, Tenriism Practices, Values that affect care: no               Additional Information:  Per MD note: This patient is a 69 year old female with serous uterine carcinoma who is POD#9 following exploratory laparotomy, total abdominal hysterectomy, bilateral salpingo-oophorectomy, and lysis of adhesions with Dr. Perez on 7/12/24. Her operation was complicated by a cystotomy which was repaired at the time of her operation with subsequent insertion of a supra-pubic catheter. 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 She presents to the emergency department for concerns of a small bowel obstruction given new-onset nausea and  vomiting, inability to pass flatus, and no bowel movement since her discharge from the hospital on 7/17.     SW reviewed chart and met with pt to clarify PCA services and Home care for SN,  Pt said that [facility] met with her about skilled nursing for new superpubic catheter, but hadn't scheduled days to come out before she was readmitted to the hospital.   Pt has PCA services. SW will follow up to find out name of company.     Pt get a assistance from her children and grandchildren. Her PCA services follow her to her daughters home. She was discharged there last time.     Social work will continue to follow and provide assistance to ensure a safe and timely discharge   BRIANNA Sanderson   5A Oncology beds:5220-40 & 5C  beds 5417-32 (non BMT )      Vocera:  Phone: [phone]  Pager: [phone]

## 2024-07-22 NOTE — PROGRESS NOTES
Admitted/transferred from:   2 RN full skin assessment completed by Steff Bill RN and Sonia HARDING RN  Skin assessment finding: Lower abdominal skin redness in skinfold. Skin breakdown/redness on coccyx. Suprapubic kent, kent catheter, PIV, and TINY on RLQ.  Interventions/actions: barrier cream applied to coccyx.     Will continue to monitor.

## 2024-07-23 ENCOUNTER — APPOINTMENT (OUTPATIENT)
Dept: PHYSICAL THERAPY | Facility: CLINIC | Age: 69
DRG: 863 | End: 2024-07-23
Payer: COMMERCIAL

## 2024-07-23 ENCOUNTER — APPOINTMENT (OUTPATIENT)
Dept: OCCUPATIONAL THERAPY | Facility: CLINIC | Age: 69
DRG: 863 | End: 2024-07-23
Payer: COMMERCIAL

## 2024-07-23 LAB
ANION GAP SERPL CALCULATED.3IONS-SCNC: 8 MMOL/L (ref 7–15)
BACTERIA UR CULT: ABNORMAL
BACTERIA UR CULT: ABNORMAL
BASOPHILS # BLD AUTO: 0 10E3/UL (ref 0–0.2)
BASOPHILS NFR BLD AUTO: 0 %
BUN SERPL-MCNC: 10.6 MG/DL (ref 8–23)
CALCIUM SERPL-MCNC: 7.7 MG/DL (ref 8.8–10.4)
CHLORIDE SERPL-SCNC: 105 MMOL/L (ref 98–107)
CREAT FLD-MCNC: 0.8 MG/DL
CREAT SERPL-MCNC: 0.78 MG/DL (ref 0.51–0.95)
CREATININE BODY FLUID SOURCE: NORMAL
EGFRCR SERPLBLD CKD-EPI 2021: 81 ML/MIN/1.73M2
EOSINOPHIL # BLD AUTO: 0 10E3/UL (ref 0–0.7)
EOSINOPHIL NFR BLD AUTO: 0 %
ERYTHROCYTE [DISTWIDTH] IN BLOOD BY AUTOMATED COUNT: 24.3 % (ref 10–15)
GLUCOSE BLDC GLUCOMTR-MCNC: 101 MG/DL (ref 70–99)
GLUCOSE BLDC GLUCOMTR-MCNC: 116 MG/DL (ref 70–99)
GLUCOSE BLDC GLUCOMTR-MCNC: 142 MG/DL (ref 70–99)
GLUCOSE BLDC GLUCOMTR-MCNC: 92 MG/DL (ref 70–99)
GLUCOSE BLDC GLUCOMTR-MCNC: 97 MG/DL (ref 70–99)
GLUCOSE SERPL-MCNC: 97 MG/DL (ref 70–99)
HCO3 SERPL-SCNC: 27 MMOL/L (ref 22–29)
HCT VFR BLD AUTO: 23.3 % (ref 35–47)
HGB BLD-MCNC: 7.2 G/DL (ref 11.7–15.7)
IMM GRANULOCYTES # BLD: 0.1 10E3/UL
IMM GRANULOCYTES NFR BLD: 1 %
LYMPHOCYTES # BLD AUTO: 1.5 10E3/UL (ref 0.8–5.3)
LYMPHOCYTES NFR BLD AUTO: 20 %
MAGNESIUM SERPL-MCNC: 1.4 MG/DL (ref 1.7–2.3)
MCH RBC QN AUTO: 23.8 PG (ref 26.5–33)
MCHC RBC AUTO-ENTMCNC: 30.9 G/DL (ref 31.5–36.5)
MCV RBC AUTO: 77 FL (ref 78–100)
MONOCYTES # BLD AUTO: 0.9 10E3/UL (ref 0–1.3)
MONOCYTES NFR BLD AUTO: 12 %
NEUTROPHILS # BLD AUTO: 5 10E3/UL (ref 1.6–8.3)
NEUTROPHILS NFR BLD AUTO: 67 %
NRBC # BLD AUTO: 0 10E3/UL
NRBC BLD AUTO-RTO: 0 /100
PHOSPHATE SERPL-MCNC: 1.7 MG/DL (ref 2.5–4.5)
PLATELET # BLD AUTO: 382 10E3/UL (ref 150–450)
POTASSIUM SERPL-SCNC: 3.1 MMOL/L (ref 3.4–5.3)
POTASSIUM SERPL-SCNC: 3.8 MMOL/L (ref 3.4–5.3)
RBC # BLD AUTO: 3.03 10E6/UL (ref 3.8–5.2)
SODIUM SERPL-SCNC: 140 MMOL/L (ref 135–145)
WBC # BLD AUTO: 7.4 10E3/UL (ref 4–11)

## 2024-07-23 PROCEDURE — 36415 COLL VENOUS BLD VENIPUNCTURE: CPT | Performed by: OBSTETRICS & GYNECOLOGY

## 2024-07-23 PROCEDURE — 250N000011 HC RX IP 250 OP 636: Performed by: OBSTETRICS & GYNECOLOGY

## 2024-07-23 PROCEDURE — 250N000013 HC RX MED GY IP 250 OP 250 PS 637: Performed by: OBSTETRICS & GYNECOLOGY

## 2024-07-23 PROCEDURE — 97116 GAIT TRAINING THERAPY: CPT | Mod: GP | Performed by: PHYSICAL THERAPIST

## 2024-07-23 PROCEDURE — 84100 ASSAY OF PHOSPHORUS: CPT | Performed by: OBSTETRICS & GYNECOLOGY

## 2024-07-23 PROCEDURE — 84132 ASSAY OF SERUM POTASSIUM: CPT | Performed by: OBSTETRICS & GYNECOLOGY

## 2024-07-23 PROCEDURE — 97530 THERAPEUTIC ACTIVITIES: CPT | Mod: GP | Performed by: PHYSICAL THERAPIST

## 2024-07-23 PROCEDURE — 82570 ASSAY OF URINE CREATININE: CPT | Performed by: UROLOGY

## 2024-07-23 PROCEDURE — 99232 SBSQ HOSP IP/OBS MODERATE 35: CPT | Mod: 24 | Performed by: OBSTETRICS & GYNECOLOGY

## 2024-07-23 PROCEDURE — 80048 BASIC METABOLIC PNL TOTAL CA: CPT | Performed by: OBSTETRICS & GYNECOLOGY

## 2024-07-23 PROCEDURE — 85025 COMPLETE CBC W/AUTO DIFF WBC: CPT | Performed by: OBSTETRICS & GYNECOLOGY

## 2024-07-23 PROCEDURE — 999N000248 HC STATISTIC IV INSERT WITH US BY RN

## 2024-07-23 PROCEDURE — 120N000002 HC R&B MED SURG/OB UMMC

## 2024-07-23 PROCEDURE — 97110 THERAPEUTIC EXERCISES: CPT | Mod: GP | Performed by: PHYSICAL THERAPIST

## 2024-07-23 PROCEDURE — G0463 HOSPITAL OUTPT CLINIC VISIT: HCPCS

## 2024-07-23 PROCEDURE — 83735 ASSAY OF MAGNESIUM: CPT | Performed by: OBSTETRICS & GYNECOLOGY

## 2024-07-23 PROCEDURE — 97535 SELF CARE MNGMENT TRAINING: CPT | Mod: GO

## 2024-07-23 PROCEDURE — 250N000013 HC RX MED GY IP 250 OP 250 PS 637: Performed by: NURSE PRACTITIONER

## 2024-07-23 RX ORDER — MAGNESIUM SULFATE HEPTAHYDRATE 40 MG/ML
4 INJECTION, SOLUTION INTRAVENOUS ONCE
Status: COMPLETED | OUTPATIENT
Start: 2024-07-23 | End: 2024-07-23

## 2024-07-23 RX ORDER — POTASSIUM CHLORIDE 750 MG/1
40 TABLET, EXTENDED RELEASE ORAL ONCE
Status: COMPLETED | OUTPATIENT
Start: 2024-07-23 | End: 2024-07-23

## 2024-07-23 RX ADMIN — DICLOFENAC SODIUM 2 G: 10 GEL TOPICAL at 14:08

## 2024-07-23 RX ADMIN — POTASSIUM & SODIUM PHOSPHATES POWDER PACK 280-160-250 MG 2 PACKET: 280-160-250 PACK at 17:52

## 2024-07-23 RX ADMIN — OXYBUTYNIN CHLORIDE 5 MG: 5 TABLET ORAL at 20:06

## 2024-07-23 RX ADMIN — OXYCODONE HYDROCHLORIDE 5 MG: 5 TABLET ORAL at 16:04

## 2024-07-23 RX ADMIN — POTASSIUM & SODIUM PHOSPHATES POWDER PACK 280-160-250 MG 2 PACKET: 280-160-250 PACK at 22:06

## 2024-07-23 RX ADMIN — APIXABAN 2.5 MG: 2.5 TABLET, FILM COATED ORAL at 09:00

## 2024-07-23 RX ADMIN — HYDROMORPHONE HYDROCHLORIDE 0.2 MG: 0.2 INJECTION, SOLUTION INTRAMUSCULAR; INTRAVENOUS; SUBCUTANEOUS at 13:37

## 2024-07-23 RX ADMIN — POTASSIUM CHLORIDE 40 MEQ: 750 TABLET, EXTENDED RELEASE ORAL at 17:52

## 2024-07-23 RX ADMIN — FUROSEMIDE 40 MG: 40 TABLET ORAL at 09:01

## 2024-07-23 RX ADMIN — HYDROMORPHONE HYDROCHLORIDE 0.2 MG: 0.2 INJECTION, SOLUTION INTRAMUSCULAR; INTRAVENOUS; SUBCUTANEOUS at 04:34

## 2024-07-23 RX ADMIN — HYDROMORPHONE HYDROCHLORIDE 0.2 MG: 0.2 INJECTION, SOLUTION INTRAMUSCULAR; INTRAVENOUS; SUBCUTANEOUS at 23:13

## 2024-07-23 RX ADMIN — MAGNESIUM SULFATE IN WATER 4 G: 40 INJECTION, SOLUTION INTRAVENOUS at 17:53

## 2024-07-23 RX ADMIN — PROCHLORPERAZINE EDISYLATE 5 MG: 5 INJECTION INTRAMUSCULAR; INTRAVENOUS at 04:56

## 2024-07-23 RX ADMIN — ONDANSETRON 4 MG: 4 TABLET, ORALLY DISINTEGRATING ORAL at 13:42

## 2024-07-23 RX ADMIN — ATENOLOL 100 MG: 50 TABLET ORAL at 09:02

## 2024-07-23 RX ADMIN — DICLOFENAC SODIUM 2 G: 10 GEL TOPICAL at 09:03

## 2024-07-23 RX ADMIN — ONDANSETRON 4 MG: 2 INJECTION INTRAMUSCULAR; INTRAVENOUS at 00:29

## 2024-07-23 RX ADMIN — APIXABAN 2.5 MG: 2.5 TABLET, FILM COATED ORAL at 20:06

## 2024-07-23 RX ADMIN — ONDANSETRON 4 MG: 2 INJECTION INTRAMUSCULAR; INTRAVENOUS at 23:16

## 2024-07-23 RX ADMIN — OXYBUTYNIN CHLORIDE 5 MG: 5 TABLET ORAL at 09:01

## 2024-07-23 RX ADMIN — ERTAPENEM SODIUM 1 G: 1 INJECTION, POWDER, LYOPHILIZED, FOR SOLUTION INTRAMUSCULAR; INTRAVENOUS at 20:06

## 2024-07-23 RX ADMIN — OXYCODONE HYDROCHLORIDE 5 MG: 5 TABLET ORAL at 20:06

## 2024-07-23 RX ADMIN — DICLOFENAC SODIUM 2 G: 10 GEL TOPICAL at 19:15

## 2024-07-23 RX ADMIN — OXYBUTYNIN CHLORIDE 5 MG: 5 TABLET ORAL at 14:08

## 2024-07-23 ASSESSMENT — ACTIVITIES OF DAILY LIVING (ADL)
ADLS_ACUITY_SCORE: 45
FALL_HISTORY_WITHIN_LAST_SIX_MONTHS: NO
HEARING_DIFFICULTY_OR_DEAF: NO
ADLS_ACUITY_SCORE: 45
ADLS_ACUITY_SCORE: 45
ADLS_ACUITY_SCORE: 47
DRESSING/BATHING: BATHING DIFFICULTY, ASSISTANCE 1 PERSON
WALKING_OR_CLIMBING_STAIRS_DIFFICULTY: YES
CONCENTRATING,_REMEMBERING_OR_MAKING_DECISIONS_DIFFICULTY: NO
DRESSING/BATHING_DIFFICULTY: YES
ADLS_ACUITY_SCORE: 47
ADLS_ACUITY_SCORE: 45
ADLS_ACUITY_SCORE: 48
WALKING_OR_CLIMBING_STAIRS: AMBULATION DIFFICULTY, REQUIRES EQUIPMENT
DIFFICULTY_EATING/SWALLOWING: NO
ADLS_ACUITY_SCORE: 45
ADLS_ACUITY_SCORE: 45
ADLS_ACUITY_SCORE: 47
ADLS_ACUITY_SCORE: 45
EQUIPMENT_CURRENTLY_USED_AT_HOME: CANE, STRAIGHT;WALKER, STANDARD;SHOWER CHAIR
DIFFICULTY_COMMUNICATING: NO
ADLS_ACUITY_SCORE: 48
TOILETING_ASSISTANCE: TOILETING DIFFICULTY, ASSISTANCE 1 PERSON
ADLS_ACUITY_SCORE: 45
ADLS_ACUITY_SCORE: 45
ADLS_ACUITY_SCORE: 47
ADLS_ACUITY_SCORE: 45
DOING_ERRANDS_INDEPENDENTLY_DIFFICULTY: YES
ADLS_ACUITY_SCORE: 47
ADLS_ACUITY_SCORE: 45
TOILETING_ISSUES: YES
ADLS_ACUITY_SCORE: 47
WEAR_GLASSES_OR_BLIND: YES
ADLS_ACUITY_SCORE: 47

## 2024-07-23 NOTE — PROGRESS NOTES
Brief Progress Note    In to see patient for PM rounds. Doing ok. Got a shower today. Passing flatus. Had BM. Ate large meal and feels like she over ate. Pain well managed.  No fever, chills, other complaints.    Incision stable in appearance. Gently irrigated with saline and cleaned. Less purulent, though still malodorous.     No draining around suprapubic cath, which continues to drain well. Shahid draining well.     No changes to plan of care.     Nakita Mcfarlane MD  Obstetrics & Gynecology, PGY-3  7/22/2024 8:28 PM

## 2024-07-23 NOTE — PROGRESS NOTES
Brief Progress Note    Alerted by RN of low blood pressure of 88/49. Repeat BP up to 101/63. Went to check on patient. She was sitting up in the chair watching TV. She shares that her only source of pain right now is the wounds inbetween her buttocks and under her abdominal pannus. WOC team in to see her earlier today. She tolerated breakfast this morning without nausea or vomiting, and had just ordered lunch. She worked with OT on doing steps today and felt that their visit was very helpful. She does not have fever or chills. No abdominal pain. Abdomen is soft, mild diffuse tenderness, non-distended.     Krupa Escobar MD  [facility]  Obstetrics, Gynecology, & Women's Health PGY1

## 2024-07-23 NOTE — PLAN OF CARE
[id number]  BP 95/60 (BP Location: Left arm)   Pulse 60   Temp 98.7  F (37.1  C) (Oral)   Resp 16   SpO2 99%     Afebrile, VSS.  C/O abdominal pain, 2 x IV dilaudid given.   Walked in the hallway, showered. Up to on chair.         NEURO: Alert and oriented x4.      RESPIRATORY: Room Air, denies dizziness, and SOB.     CARDIO: VSS, denies dizziness,      GI/: Denies N/V, BS active, BM x 2, AUOP via kent, and suprapubic catheter.       SKIN: Breakdown at Coccyx, skinfold at lower abdominal, and at butt crack.      ACTIVITY: Assist 1-2     PAIN: Yes, abdominal and lower extremities.     DLA: PIV, NS locked.     BG: Yes.      PLAN: Continue monitoring.     * Italic, from provider's note.

## 2024-07-23 NOTE — PROGRESS NOTES
Care Management Follow Up    Length of Stay (days): 2    Expected Discharge Date: 07/24/2024     Concerns to be Addressed: discharge planning     Patient plan of care discussed at interdisciplinary rounds: Yes    Anticipated Discharge Disposition: Home Care     Anticipated Discharge Services: PCA  Anticipated Discharge DME: None    Patient/family educated on Medicare website which has current facility and service quality ratings: yes  Education Provided on the Discharge Plan:    Patient/Family in Agreement with the Plan: yes    Referrals Placed by CM/SW: External Care Coordination  Private pay costs discussed: Not applicable    Additional Information:   met with patient at bedside.   went over her therapy recommendations from PT/OT for TCU.  Patient would prefer to go back to her daughter's home.   went over the Medicare.gov list of TCU's  explaining the start rating.   asked patient if she can look through the list and [place] [facility] facilities that social work could send referrals to.   let her know sending the referrals did not mean that she had to go.  Therapy recs could change while she is in the hospital.    PT talked with SW about her concerns for patient returning home.  Patient was recently discharged and returned with extreme weakness and possible infection and sores on her bottom.  PT is worried that patient is not getting enough when she is at home.  Patient has 5 large steps to get into the home, as her last discharge she had to be carried because she could not walk the steps.  told PT that she will continue talking with patient, encouraging her that a short-term stay at transitional care would be very beneficial for her before returning home.  PT is trying to get a hold of patient's daughter to do some training in case patient does go home.  Unfortunately patient's daughter arrived too late to receive training the last time patient  was discharged.    Pt said she uses [facility] for PCA services. She is gong to call them today.    Social work will continue to follow and provide assistance to ensure a safe and timely discharge   BRIANNA Sanderson   5A Oncology beds:5220-40 & 5C  beds 5417-32 (non BMT )      Vocera:  Phone: [phone]  Pager: [phone]

## 2024-07-23 NOTE — PLAN OF CARE
/63 (BP Location: Left arm)   Pulse 56   Temp 97.9  F (36.6  C) (Oral)   Resp 15   SpO2 98%      Goal Outcome Evaluation: no change       Plan of Care Reviewed With: patient    Overall Patient Progress: no changeOverall Patient Progress: no change    Outcome Evaluation: Pt alert and oriented x4. Makes needs know. BP was slightly low earlier in the day SBP in the 80's. MD aware. Recent check was 101/63. Stable. Pt worked with PT an OT. Ate well. Up in the chair. Is passing gas, no bm. Shahid intact. See flowsheet for output. No further concerns at this time.will continue to monitor closely.

## 2024-07-23 NOTE — PROVIDER NOTIFICATION
Paged: Dr. Nakita Mcfarlane    Page: Hi doc. FYTEO pt only had a total of 150 UOP overnight from her kent and suprapubic cath    Response: Ok thanks. Did it look concentrated? I'm ending my shift but the day time team is now rounding    Paged @[phone]: A little bit, but not rahel. I'll let the day RN know as well    Response: Thank  you

## 2024-07-23 NOTE — CONSULTS
"[facility] Nurse Inpatient Assessment     Consulted for: eval coccyx. eval low abd wound    Patient History (according to provider note(s):      This patient is a 69 year old female with serous uterine carcinoma who is POD#9 following exploratory laparotomy, total abdominal hysterectomy, bilateral salpingo-oophorectomy, and lysis of adhesions with Dr. Perez on 7/12/24. Her operation was complicated by a cystotomy which was repaired at the time of her operation with subsequent insertion of a supra-pubic catheter. 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 She presents to the emergency department for concerns of a small bowel obstruction given new-onset nausea and vomiting, inability to pass flatus, and no bowel movement since her discharge from the hospital on 7/17.      Aranza endorses that she has been having multiple episodes of emesis for the past 3 days. Additionally endorses significant decrease in PO intake since this time. She states she was able to eat some solid food up until 2 days ago, but states that yesterday she was only able to tolerate broth and today she hasn't been able to keep anything down. Reports that her emesis is brown in color and malodorous. Denies hematemesis. Reports that she has not had a bowel movement since 7/17, however, states that she was able to \"pass a lot of gas\" earlier this afternoon. She endorses that her both her suprapubic and kent catheters as well as her TINY drain have been having adequate drainage that has not changed since the time of her discharge. " "Endorses that her low abdominal incision has started to hurt more over the past few days and has been draining some moisture. Reports she has been placing baby powder over it to help keep it dry.      Aranza additionally reports that she has noticed some light vaginal spotting when wiping after using the restroom. Reports that she has additionally been \"damp\" in her vaginal area. She does not endorse any fevers, but states she is \"always chilled\"    Assessment:      Areas visualized during today's visit: Sacrum/coccyx and Abdomen    Wound location: Lower abdomen    Right pannus crease     Left Pannus crease  Last photo: 7/23/24  Wound due to: Surgical Wound  Wound history/plan of care: dehisced surgical incision. See Pt history above. Pt did not like to be fully reclined and has pain while attempting to assess the incision. Wound is deeper than it appears in the picture.    Wound base: 30 % granulation tissue, 70 % slough     Palpation of the wound bed: boggy      Drainage: small     Description of drainage: serosanguinous     Measurements (length x width x depth, in cm): Right side 7  x 1  x  1 cm Left side 3 x 0.5 x 0.5     Tunneling: N/A not able to fully assess due to pain     Undermining: N/A  Periwound skin: Intact      Color: normal and consistent with surrounding tissue      Temperature: normal   Odor: mild  Pain: moderate, sharp and tender  Pain interventions prior to dressing change: slow and gentle cares  and distraction  Treatment goal: Heal , Infection control/prevention, and Remove necrotic tissue  STATUS: initial assessment  Supplies ordered: gathered and ordered hydrofera blue       Wound location: Yaa rectum    Last photo: 7/23  Wound due to: Incontinence Associated Dermatitis (IAD)  Wound history/plan of care: Had loose stools  Wound base: 100 % dermis, 2 areas that are open     Palpation of the wound bed: normal      Drainage: scant     Description of drainage: serosanguinous     Measurements " (length x width x depth, in cm):Right side 0.5  x 0.7  x  0.2 cm left side pinpoint     Tunneling: N/A     Undermining: N/A  Periwound skin: Intact      Color: normal and consistent with surrounding tissue and unable to see all skin due to paste      Temperature: normal   Odor: mild  Pain: moderate, sharp when open areas were touched.  Pain interventions prior to dressing change: slow and gentle cares   Treatment goal: Heal  and Protection  STATUS: initial assessment  Supplies ordered: at bedside and ordered hydrofera blue      Treatment Plan:     Lower abdominal incision wound(s): Every other day and PRN  With dressing removal moisten purple foam with NS to soften prior to removal.  Cleanse wound with MicroKlenz and pat dry.  Paint quang-wound skin with No Sting barrier film  Cut a piece of hydrofera blue foam (435756) slightly smaller than wound bed and soften with sterile NS solution.  Wring out excess saline and place foam on wound bed, cover with Mepilex flex 4x4 ([id number]).     Buttocks BID and as needed.   Cleanse the area with Marry cleanse and protect, very gently with soft cloth.  Apply ostomy powder (#438209) on all open and denuded skin.  Apply thin layer of Barrier paste (ex: Critic aid) on top of it.  With repeat application, do not scrub the paste, only remove soiled paste and reapply.  If complete removal of paste is necessary use baby oil/mineral oil (#734900) and soft wash cloth.  Ensure pt has chair cushion (#972172) while sitting up in the chair.  Use only one blue pad in between mattress and pt. No brief in bed.     Orders: Written    RECOMMEND PRIMARY TEAM ORDER: None, at this time  Education provided: importance of repositioning and Infection prevention   Discussed plan of care with: Patient  WOC nurse follow-up plan: weekly  Notify WOC if wound(s) deteriorate.  Nursing to notify the Provider(s) and re-consult the WOC Nurse if new skin concern.    DATA:     Current support surface: Standard   Standard Isoflex gel  Containment of urine/stool: Incontinence Protocol  BMI: There is no height or weight on file to calculate BMI.   Active diet order: Orders Placed This Encounter      Regular Diet Adult     Output: I/O last 3 completed shifts:  In: 997 [P.O.:997]  Out: 1675 [Urine:1625; Drains:50]     Labs:   Recent Labs   Lab 07/23/24  0636 07/21/24  1542   ALBUMIN  --  2.5*   HGB 7.2* 8.5*   WBC 7.4 12.7*     Pressure injury risk assessment:   Sensory Perception: 3-->slightly limited  Moisture: 3-->occasionally moist  Activity: 2-->chairfast  Mobility: 3-->slightly limited  Nutrition: 2-->probably inadequate  Friction and Shear: 2-->potential problem  Yogi Score: 15    Kathy Gold RN CWOCN  Please contact through Radha Garcia group: [facility] Nurse [place]  Dept. Office Number: [phone]

## 2024-07-23 NOTE — PROGRESS NOTES
Gynecology Oncology Progress Note  07/23/2024    24 hour events:   - restarted PTA eliquis  - tolerating regular diet without n/v, passing flatus, had BM  - aerobic wound cx: Klebsiella, proteus  - PT/OT consulted> recommended TCU  - urine culture: >100k klebsiella    Subjective: Patient is doing well.  Pain is well controlled.  Tolerating regular diet. Says she ate too much yesterday and felt briefly nauseous after large meal. Otherwise has been feeling fine without nausea or vomiting. Passing flatus but no bowel movement since yesterday morning. Was out of bed yesterday with assistance, no concerns. Was able to shower. Has indwelling suprapubic catheter and kent. Denies chest pain, SOB, fevers/chills, dizziness. She notes her butt is sore this morning. Tried Mepilex dressing yesterday with improvement, though the dressing came off shortly after placement since she has barrier cream on.     Objective:   Patient Vitals for the past 24 hrs:   BP Temp Temp src Pulse Resp SpO2   07/23/24 0443 108/66 98.3  F (36.8  C) -- 105 16 100 %   07/22/24 2343 122/66 98  F (36.7  C) Oral 58 16 100 %   07/22/24 1940 95/60 98.7  F (37.1  C) Oral 60 16 99 %   07/22/24 1636 105/72 98.1  F (36.7  C) Oral 68 16 100 %   07/22/24 1209 103/68 98.2  F (36.8  C) Oral 72 16 100 %   07/22/24 0824 98/60 97.9  F (36.6  C) Oral 77 18 100 %     General: NAD, comfortable  Cardiovascular: regular rate and rhythm  Respiratory: normal rate and work of breathing on room air  Abdomen: soft, appropriately tender, non-distended  Incision: superficial dehiscence with malodorous pus  Drains: suprapubic catheter draining clear yellow urine, kent draining clear yellow urine, TINY drain with small serosanguinous output  Extremities: warm, well-perfused, nontender, SCDs in place      Assessment/Plan: 69 year old POD#11 s/p XL, SNEHA, BSO, lysis of adhesions complicated by cystotomy w/ suprapubic catheter placement in the setting of stage II endometrial serous  carcinoma now HD#3 admitted due to concern for small bowel obstruction, UTI, and superficial surgical site infection.         # Endometrial serous carcinoma  - Scheduled to follow-up with Dr. Perez on 7/30/24 to discuss chemotherapy     # Suprapubic catheter  # UTI   # Possible bladder leak vs vesico-vaginal fistula  - Urine culture with >100k Klebsiella and 10-50k gram negative bacilli, currently on empiric ertapenem given previous ESBL.  Await culture sensitivities for final ABx plan.  - Per urology no further workup/intervention for possible persistent cystotomy/fistula given her catheters are draining well     # Superficial surgical site infection  - Wound culture with Klebsiella and Proteus.  On ertapenem for empiric coverage.  Await culture sensitivities for final ABx plan.     # Chronic pain  # Osteoarthritis  - Scheduled tylenol, PRN oxycodone     # Hypertension  - Continue PTA atenolol and lasix      # Atrial fibrillation  # Chronic anticoagulation  - Patient currently in normal sinus rhythm   - PTA apixaban      # Paranoid schizophrenia  - Continue PTA Seroquel, Zoloft, cogentin, and Ambien      # History of gastric bypass  - Avoid NSAIDs    Code: Full  PPx: SCDs, PTA Eliquis    Dispo: to TCU pending bed availability and clinical course    Please page the Gynecology Oncology team [phone] with questions or concerns.    Nakita Mcfarlane MD  Obstetrics & Gynecology, PGY-3  7/23/2024 6:07 AM     I, Corby Webb MD personally examined and evaluated this patient on 07/23/24.  I discussed the patient with the resident and care team, and agree with the assessment and plan of care as documented in the residents note above.    I personally reviewed vital signs, laboratory values and imaging results.    Pt with cultures growing proteus and klebsiella.  Await sensitivities to tailor antibiotic regimen.  Needs TCU at discharge and will have SW assist with safe discharge plan    Lilliam Webb  MD  Gynecologic Oncology  [facility]

## 2024-07-23 NOTE — PLAN OF CARE
/61 (BP Location: Left arm)   Pulse 56   Temp 97.6  F (36.4  C) (Oral)   Resp 15   SpO2 100%     [id number]    Goal Outcome Evaluation:      Plan of Care Reviewed With: patient    Overall Patient Progress: no changeOverall Patient Progress: no change     Pt is A/O x4; able to make needs known. VSS on RA. Abd pain mgd with IV dilaudid x2; offered PO pain meds as well, but pt would rather wait for IV meds; encourage PO pain meds as well. Zofran x1 and Compazine x1 for intermittent nausea. Up with 1-2x assist. No BM this shift; BS+, passing gas. Lower abd incision SHAILESH; no drainage noted; skin breakdown at coccyx/buttcrack; barrier cream applied. BLE 2+ edema; legs elevated on pillows. PIV SL. R. TINY with serous output and some clot; 20 ml output this shift. Suprapubic and kent catheters in place; low UOP this shift; MD notified. Day shift RN also notified to follow up with team this morning. Continue to follow POC.

## 2024-07-24 ENCOUNTER — PRE VISIT (OUTPATIENT)
Dept: UROLOGY | Facility: CLINIC | Age: 69
End: 2024-07-24
Payer: COMMERCIAL

## 2024-07-24 ENCOUNTER — APPOINTMENT (OUTPATIENT)
Dept: OCCUPATIONAL THERAPY | Facility: CLINIC | Age: 69
DRG: 863 | End: 2024-07-24
Payer: COMMERCIAL

## 2024-07-24 LAB
ALBUMIN SERPL BCG-MCNC: 2.1 G/DL (ref 3.5–5.2)
ALP SERPL-CCNC: 62 U/L (ref 40–150)
ALT SERPL W P-5'-P-CCNC: <5 U/L (ref 0–50)
ANION GAP SERPL CALCULATED.3IONS-SCNC: 8 MMOL/L (ref 7–15)
AST SERPL W P-5'-P-CCNC: 12 U/L (ref 0–45)
BACTERIA WND CULT: ABNORMAL
BILIRUB SERPL-MCNC: <0.2 MG/DL
BUN SERPL-MCNC: 8.5 MG/DL (ref 8–23)
CALCIUM SERPL-MCNC: 7.8 MG/DL (ref 8.8–10.4)
CHLORIDE SERPL-SCNC: 103 MMOL/L (ref 98–107)
CREAT SERPL-MCNC: 0.77 MG/DL (ref 0.51–0.95)
EGFRCR SERPLBLD CKD-EPI 2021: 82 ML/MIN/1.73M2
ERYTHROCYTE [DISTWIDTH] IN BLOOD BY AUTOMATED COUNT: 24.7 % (ref 10–15)
GLUCOSE BLDC GLUCOMTR-MCNC: 82 MG/DL (ref 70–99)
GLUCOSE BLDC GLUCOMTR-MCNC: 92 MG/DL (ref 70–99)
GLUCOSE SERPL-MCNC: 103 MG/DL (ref 70–99)
GRAM STAIN RESULT: ABNORMAL
HCO3 SERPL-SCNC: 26 MMOL/L (ref 22–29)
HCT VFR BLD AUTO: 27.1 % (ref 35–47)
HGB BLD-MCNC: 8.1 G/DL (ref 11.7–15.7)
MAGNESIUM SERPL-MCNC: 2.3 MG/DL (ref 1.7–2.3)
MCH RBC QN AUTO: 23.3 PG (ref 26.5–33)
MCHC RBC AUTO-ENTMCNC: 29.9 G/DL (ref 31.5–36.5)
MCV RBC AUTO: 78 FL (ref 78–100)
PHOSPHATE SERPL-MCNC: 2.1 MG/DL (ref 2.5–4.5)
PLATELET # BLD AUTO: 435 10E3/UL (ref 150–450)
POTASSIUM SERPL-SCNC: 4.1 MMOL/L (ref 3.4–5.3)
PROT SERPL-MCNC: 5.6 G/DL (ref 6.4–8.3)
RBC # BLD AUTO: 3.48 10E6/UL (ref 3.8–5.2)
SODIUM SERPL-SCNC: 137 MMOL/L (ref 135–145)
WBC # BLD AUTO: 9.1 10E3/UL (ref 4–11)

## 2024-07-24 PROCEDURE — 80053 COMPREHEN METABOLIC PANEL: CPT | Performed by: OBSTETRICS & GYNECOLOGY

## 2024-07-24 PROCEDURE — 250N000011 HC RX IP 250 OP 636: Performed by: OBSTETRICS & GYNECOLOGY

## 2024-07-24 PROCEDURE — 250N000013 HC RX MED GY IP 250 OP 250 PS 637: Performed by: NURSE PRACTITIONER

## 2024-07-24 PROCEDURE — 83735 ASSAY OF MAGNESIUM: CPT | Performed by: OBSTETRICS & GYNECOLOGY

## 2024-07-24 PROCEDURE — 85027 COMPLETE CBC AUTOMATED: CPT | Performed by: OBSTETRICS & GYNECOLOGY

## 2024-07-24 PROCEDURE — 99255 IP/OBS CONSLTJ NEW/EST HI 80: CPT | Mod: 24 | Performed by: STUDENT IN AN ORGANIZED HEALTH CARE EDUCATION/TRAINING PROGRAM

## 2024-07-24 PROCEDURE — 250N000013 HC RX MED GY IP 250 OP 250 PS 637

## 2024-07-24 PROCEDURE — 250N000013 HC RX MED GY IP 250 OP 250 PS 637: Performed by: OBSTETRICS & GYNECOLOGY

## 2024-07-24 PROCEDURE — 99232 SBSQ HOSP IP/OBS MODERATE 35: CPT | Mod: 24 | Performed by: OBSTETRICS & GYNECOLOGY

## 2024-07-24 PROCEDURE — 97140 MANUAL THERAPY 1/> REGIONS: CPT | Mod: GO | Performed by: OCCUPATIONAL THERAPIST

## 2024-07-24 PROCEDURE — 36415 COLL VENOUS BLD VENIPUNCTURE: CPT | Performed by: OBSTETRICS & GYNECOLOGY

## 2024-07-24 PROCEDURE — 84100 ASSAY OF PHOSPHORUS: CPT | Performed by: OBSTETRICS & GYNECOLOGY

## 2024-07-24 PROCEDURE — 120N000002 HC R&B MED SURG/OB UMMC

## 2024-07-24 RX ORDER — METRONIDAZOLE 500 MG/1
500 TABLET ORAL 3 TIMES DAILY
Status: DISCONTINUED | OUTPATIENT
Start: 2024-07-24 | End: 2024-07-26 | Stop reason: HOSPADM

## 2024-07-24 RX ORDER — POLYETHYLENE GLYCOL 3350 17 G/17G
17 POWDER, FOR SOLUTION ORAL 2 TIMES DAILY
Status: DISCONTINUED | OUTPATIENT
Start: 2024-07-24 | End: 2024-07-26 | Stop reason: HOSPADM

## 2024-07-24 RX ORDER — SULFAMETHOXAZOLE/TRIMETHOPRIM 800-160 MG
1 TABLET ORAL 2 TIMES DAILY
Status: DISCONTINUED | OUTPATIENT
Start: 2024-07-24 | End: 2024-07-26 | Stop reason: HOSPADM

## 2024-07-24 RX ADMIN — DICLOFENAC SODIUM 2 G: 10 GEL TOPICAL at 16:33

## 2024-07-24 RX ADMIN — OXYCODONE HYDROCHLORIDE 5 MG: 5 TABLET ORAL at 13:21

## 2024-07-24 RX ADMIN — OXYBUTYNIN CHLORIDE 5 MG: 5 TABLET ORAL at 20:37

## 2024-07-24 RX ADMIN — OXYCODONE HYDROCHLORIDE 5 MG: 5 TABLET ORAL at 20:37

## 2024-07-24 RX ADMIN — FUROSEMIDE 40 MG: 40 TABLET ORAL at 09:16

## 2024-07-24 RX ADMIN — OXYBUTYNIN CHLORIDE 5 MG: 5 TABLET ORAL at 09:30

## 2024-07-24 RX ADMIN — DICLOFENAC SODIUM 2 G: 10 GEL TOPICAL at 13:21

## 2024-07-24 RX ADMIN — ATENOLOL 100 MG: 50 TABLET ORAL at 09:16

## 2024-07-24 RX ADMIN — POTASSIUM & SODIUM PHOSPHATES POWDER PACK 280-160-250 MG 1 PACKET: 280-160-250 PACK at 09:16

## 2024-07-24 RX ADMIN — APIXABAN 2.5 MG: 2.5 TABLET, FILM COATED ORAL at 20:37

## 2024-07-24 RX ADMIN — OXYCODONE HYDROCHLORIDE 5 MG: 5 TABLET ORAL at 05:15

## 2024-07-24 RX ADMIN — OXYBUTYNIN CHLORIDE 5 MG: 5 TABLET ORAL at 13:21

## 2024-07-24 RX ADMIN — SULFAMETHOXAZOLE AND TRIMETHOPRIM 1 TABLET: 800; 160 TABLET ORAL at 20:37

## 2024-07-24 RX ADMIN — POTASSIUM & SODIUM PHOSPHATES POWDER PACK 280-160-250 MG 1 PACKET: 280-160-250 PACK at 16:14

## 2024-07-24 RX ADMIN — DICLOFENAC SODIUM 2 G: 10 GEL TOPICAL at 20:40

## 2024-07-24 RX ADMIN — ACETAMINOPHEN 650 MG: 325 TABLET, FILM COATED ORAL at 16:14

## 2024-07-24 RX ADMIN — APIXABAN 2.5 MG: 2.5 TABLET, FILM COATED ORAL at 09:16

## 2024-07-24 RX ADMIN — POTASSIUM & SODIUM PHOSPHATES POWDER PACK 280-160-250 MG 2 PACKET: 280-160-250 PACK at 01:43

## 2024-07-24 RX ADMIN — HYDROMORPHONE HYDROCHLORIDE 0.2 MG: 0.2 INJECTION, SOLUTION INTRAMUSCULAR; INTRAVENOUS; SUBCUTANEOUS at 16:14

## 2024-07-24 RX ADMIN — ONDANSETRON 4 MG: 2 INJECTION INTRAMUSCULAR; INTRAVENOUS at 16:29

## 2024-07-24 RX ADMIN — DICLOFENAC SODIUM 2 G: 10 GEL TOPICAL at 09:20

## 2024-07-24 RX ADMIN — METRONIDAZOLE 500 MG: 500 TABLET ORAL at 20:37

## 2024-07-24 RX ADMIN — OXYCODONE HYDROCHLORIDE 5 MG: 5 TABLET ORAL at 09:24

## 2024-07-24 RX ADMIN — POTASSIUM & SODIUM PHOSPHATES POWDER PACK 280-160-250 MG 1 PACKET: 280-160-250 PACK at 13:20

## 2024-07-24 ASSESSMENT — ACTIVITIES OF DAILY LIVING (ADL)
ADLS_ACUITY_SCORE: 45
ADLS_ACUITY_SCORE: 45
ADLS_ACUITY_SCORE: 46
ADLS_ACUITY_SCORE: 45
ADLS_ACUITY_SCORE: 46
ADLS_ACUITY_SCORE: 46
ADLS_ACUITY_SCORE: 45
ADLS_ACUITY_SCORE: 45
ADLS_ACUITY_SCORE: 46
ADLS_ACUITY_SCORE: 45
ADLS_ACUITY_SCORE: 46
ADLS_ACUITY_SCORE: 46
ADLS_ACUITY_SCORE: 45
ADLS_ACUITY_SCORE: 46

## 2024-07-24 NOTE — PLAN OF CARE
"Goal Outcome Evaluation:      Plan of Care Reviewed With: patient    Overall Patient Progress: no changeOverall Patient Progress: no change    0700 - 1100:   /67 (BP Location: Right arm)   Pulse 53   Temp 97.6  F (36.4  C) (Oral)   Resp 16   Ht 1.54 m (5' 0.63\")   Wt 72.2 kg (159 lb 2.8 oz)   SpO2 100%   BMI 30.44 kg/m      A&O x 4, AVSS on RA.  Gave oxycodone 5 mg x 1 for lower anterior abdomen pain.  No c/o nausea, poor appetite, only ate 25% form breakfast tray.  On BID buttock wound care, wound care done for this shift.  Up assist 1 - 2 to bathroom, supra pubic catheter patent with 400 ml output, TINY with 0 output, had a soft bm this morning.  Anticipate TCU discharge after discussion with pt & families.  Continue with poc...      "

## 2024-07-24 NOTE — PROGRESS NOTES
"Gynecology Oncology Progress Note  07/24/2024    24 hour events:   - WOC consult  - Daughter updated 7/23, in support of TCU   - Pt undecided re: which TCU referrals  - Ucx, wound Cx with Klebsiella ESBL, sensitivities back    Subjective: Patient is doing well.  Pain is well controlled with oral pain medications.  Tolerating PO, had a small BM yesterday (not documented). Feeling constipated. No acute events overnight.     Objective:   Patient Vitals for the past 24 hrs:   BP Temp Temp src Pulse Resp SpO2 Height Weight   07/24/24 0423 118/67 97.9  F (36.6  C) Oral 53 16 100 % -- --   07/24/24 0014 114/66 98.2  F (36.8  C) Oral 57 16 100 % -- --   07/23/24 2045 128/75 98  F (36.7  C) Oral 60 16 98 % -- --   07/23/24 1726 -- -- -- -- -- -- 1.54 m (5' 0.63\") 72.2 kg (159 lb 3.2 oz)   07/23/24 1551 102/61 97.6  F (36.4  C) Oral 61 16 93 % -- --   07/23/24 1440 101/63 -- -- 56 -- -- -- --   07/23/24 1213 (!) 88/49 97.9  F (36.6  C) Oral 55 15 98 % -- --   07/23/24 0757 101/61 97.6  F (36.4  C) Oral 56 15 100 % -- --     General: NAD, comfortable  Cardiovascular: regular rate  Respiratory: normal rate and work of breathing on room air  Abdomen: soft, appropriately tender, non-distended  Incision: superficial dehiscence with small granulation tissue  Drains: suprapubic catheter draining clear yellow urine, kent draining clear yellow urine, TINY drain with small serosanguinous output  Extremities: warm, well-perfused, nontender, SCDs in place      Assessment/Plan: 69 year old POD#12 s/p XL, SNEHA, BSO, lysis of adhesions complicated by cystotomy w/ suprapubic catheter placement in the setting of 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      # Endometrial serous carcinoma  - Scheduled to follow-up with Dr. Perez on 7/30/24 to discuss chemotherapy     # Suprapubic catheter  # UTI   # Possible bladder leak vs vesico-vaginal " fistula  - Urine culture with >100k ESBL Klebsiella, currently on empiric ertapenem given previous ESBL. Culture sensitivities returned and bactrim is the only PO option to cover both pathogens.  Will consult with ID if there may be an alternative PO regimen, otherwise will complete 10 day course of IV antibiotics   - Per urology no further workup/intervention for possible persistent cystotomy/fistula given her catheters are draining well     # Superficial surgical site infection  - Wound culture with ESBL Klebsiella and Proteus. Abx plan as above     # Chronic pain  # Osteoarthritis  - Scheduled tylenol, PRN oxycodone     # Hypertension  - Continue PTA atenolol and lasix      # Atrial fibrillation  # Chronic anticoagulation  - Patient currently in normal sinus rhythm   - PTA apixaban      # Paranoid schizophrenia  - Continue PTA Seroquel, Zoloft, cogentin, and Ambien      # History of gastric bypass  - Avoid NSAIDs    # Constipation  - Scheduled Miralax added     Code: Full  PPx: SCDs, PTA Eliquis    Dispo: to TCU pending bed availability     Please page the Gynecology Oncology team [phone] with questions or concerns.    Nakita Mcfarlane MD  Obstetrics & Gynecology, PGY-3  7/24/2024 7:17 AM     ICorby MD personally examined and evaluated this patient on 07/24/24.  I discussed the patient with the resident and care team, and agree with the assessment and plan of care as documented in the residents note above.    I personally reviewed vital signs, laboratory values and imaging results.    Pt doing better and medically ready for discharge once TCU placement can be arranged.      Lilliam Webb MD  Gynecologic Oncology  [facility]

## 2024-07-24 NOTE — CARE PLAN
Patient cares assumed from [id number]. Patient alert and oriented x 4, VSS, c/o pain rating 8/10 PRN oxycodone given. Up with assist of 1-2 to bathroom. Suprapubic cath present, patent, draining, TINY drain with no output. Buttock wound dressing change BID. Able to make needs known.

## 2024-07-24 NOTE — PROGRESS NOTES
Care Management Follow Up    Length of Stay (days): 3    Expected Discharge Date: 07/26/2024     Concerns to be Addressed: discharge planning     Patient plan of care discussed at interdisciplinary rounds: Yes    Anticipated Discharge Disposition: TBD      Anticipated Discharge Services: TBD  Anticipated Discharge DME: TBD    Patient/family educated on Medicare website which has current facility and service quality ratings: yes  Education Provided on the Discharge Plan:  TBD  Patient/Family in Agreement with the Plan: yes    Referrals Placed by CM/SW: n/a at this time  Private pay costs discussed: Not applicable    Additional Information:  [facility] for home nursing on 7/17/24.  Writer called [facility] to inquire if patient still open to them for home care.  Per [facility] intake, patient still open to them, episode ends 9/15/24.        Care management will continue to follow for discharge planning.         Home Care with [facility]  Phone [phone]  Fax [phone]          Veena Read RN, BSN  RN Care Coordinator    5A Medical Oncology/5C non-BMT  5A beds [id number]  5C beds 5520-3856 (non-BMT)  [address] [address]  [email or web address]  Gender pronouns: she/her  Units: 5A Onc Vocera & 5C Vocera

## 2024-07-24 NOTE — PROGRESS NOTES
[id number] VSS. A&Ox4. C/O moderate pain, managed w/ PRN dilaudid and oxycodone. Denies nausea. Pt currently resting comfortably, no other complaints at this time. Will continue w/ POC.    72 yo M admitted with acute hypoxemic respiratory failure + VERENA on CKD. Baseline sCr 2, sCr 3.5 on admission. Patient has CKD IIIb with nephrotic range proteinuria. He sees Dr. Leonardo, last visit was earlier this month. Per her notes, CKD etiology suspected to be multifactorial (periodic NSAID use, multiple CTs w/ contast, HTN, DM). At that time, sCr 2 and eGFR 30. Has had significant proteinuria in past (Pr:Cr 7.5g earlier this year, down to 4 earlier this month).    Cr improved with diuresis, bumped when diuresis was held  No indications for HD at this time    Plan:  - continue PO lasix 40mg BID  - Trend Cr 3.3-->2.9--> 2.8-->2.8  - UOP: 550/24 hrs  - Strict I&Os  - Avoid nephrotoxic agents  - Renally adjust medications

## 2024-07-24 NOTE — PROGRESS NOTES
Brief Progress Note    Patient doing well today. Two nieces and sister at bedside. Actively eating dinner throughout encounter. Has been tolerating good PO intake today without nausea or vomiting. Had small hard BM earlier today. Pain well managed with PO oxycodone. Saw [facility] team who cleaned her abdominal incision and butt sore which caused a bit of increased pain. Ambulated on stairs with PT. Discussed TCUs with SW. Planning to pick 5 places this evening to send referrals to. Plans to walk around the unit this evening with her family at bedside. No questions or concerns.     No changes to plan of care this evening.     Nakita Mcfarlane MD  Obstetrics & Gynecology, PGY-3  7/23/2024 8:45 PM

## 2024-07-24 NOTE — CONSULTS
[facility]  General ID Service: Initial Consultation     Patient:  Alis Hartman, Date of birth 1955, Medical record number [id number]  Date of Visit:  July 24, 2024  Consult Requested by: Santos Zamorano MD         Assessment and Recommendations:   ID Problem List:  Surgical site infection  ESBL Klebsiella pneumoniae, Proteus mirability  UTI  Similar organisms as #1  SBO, resolved  Vesicovaginal fistula  Suprapubic catheter  Recent SNEHA-BSO with EDIE 2/2 serous uterine carcinoma (7/12/24)  Prior cervical cancer s/p radiation (1996)  Subsequent radiation fibrosis    Recommendations:  Discontinue ertapenem  Start TMP-SMX 1DS PO BID and monitor overnight/tomorrow for rash/other reaction (pt agreeable to trial of this drug)  Pt has listed rash reaction to sulfa drugs noted in 2006 - looking through chart, she has received TMP-SMX at least twice since then for UTI (2013 and 2019) and denies having a rash that she can recall  Would treat for a further 7 days from today for SSTI. UTI has been fully treated at this point  Would also start metronidazole 500mg PO q8h for Bacteroides growth in anaerobic culture from wound    Discussion:  70yo F with h/o cervical cancer s/p radiation therapy with subsequent radiation fibrosis (1996), serous uterine carcinoma s/p ex-lap/SNEHA-BSO/EDIE (7/12/24), vesicovaginal fistula with suprapubic catheter placed during 7/12/24 surgery, afib, paranoid schizophrenia, HTN CKD, OA, prior gastric bypass who presented 7/21/24 with partial SBO, UTI, and SSTI after recent abdominal surgery.    Likely low risk to try TMP-SMX given apparent tolerance of this agent in 2019 and 2013. Would start it while she is inpatient so we can monitor tonight and tomorrow for any reaction. Would also start metronidazole 500mg PO q8h. If she doesn't tolerate this PO regimen, she will likely need to be continued on ertapenem for the remaining 7 days or so of therapy - given the short anticipated course, this  could be done via PIV at TCU or via infusion center daily visits (dosed q24h).    ID will continue to follow.    Ronald Rodriguez MD  Division of Infectious Diseases and International Medicine  P: [phone]       History of Present Illness:     70yo F with h/o cervical cancer s/p radiation therapy with subsequent radiation fibrosis (1996), serous uterine carcinoma s/p ex-lap/SNEHA-BSO/EDIE (7/12/24), vesicovaginal fistula with suprapubic catheter placed during 7/12/24 surgery, afib, paranoid schizophrenia, HTN CKD, OA, prior gastric bypass who presented 7/21/24 with N/V, inability to pass flatus, and lack of BM after recent discharge, concerning for SBO. She also endorsed progressive pain in her low abdominal incision from 7/12 with some serous drainage, as well as dysuria for 3-4 prior to presentation. She denied fevers, chills, hematuria, or purulence at the wound side.     On presentation, pt was afebrile with WBC 12.6. CT showed possible partial bowel obstruction, possible soft tissue gas and inflammatory changes at the suprapubic catheter (placed during her recent surgery), but no fluid collections. She was admitted, made NPO for SBO, and started on ertapenem. A wound swab culture was collected from her abdominal surgical site, with cultures now growing ESBL Klebsiella pneumoniae, Proteus mirabilis, and Bacteroides thetaiotamicron. A UA was also collected on admission, with reflex urine culture growing >100k ESBL Klebsiella pneumoniae and 10-50k non-ESBL Klebsiella pneumoniae.     Pt now denies any dysuria, says pain at surgical site is improved and drainage has decreased, and is having bowel movements. In discussing prior TMP-SMX rash reaction, she says she remembers having a rash many years ago (20+ years), but denies having any rash in the last decade from medications. Per records, she received TMP-SMX in the outpatient setting for UTI in 2019 and 2013 - no mention of any reaction or stopping the source due to  a reaction.         Review of Systems:   Full 10 point ROS obtained, pertinent positives and negatives as above.       Past Medical History:     Past Medical History:   Diagnosis Date    Atrial fibrillation (H)     Depression     Hypertension     Malignant neoplasm of endocervix (H)     Tx with radiation    Other chronic pain     Low back    Schizophrenia (H)     Urinary incontinence      Past Surgical History:   Procedure Laterality Date    ABDOMINOPLASTY      BIOPSY CERVICAL, LOCAL EXCISION, SINGLE/MULTIPLE  10/26/2011    Procedure:BIOPSY CERVICAL, LOCAL EXCISION, SINGLE/MULTIPLE; EUA, cervical biopsies; Surgeon:BETTY TINEO; Location:[facility] OR    BIOPSY VAGINAL N/A 6/8/2021    Procedure: BIOPSY, VAGINA;  Surgeon: Dany Perez MD;  Location: [facility] OR    CYSTOSCOPY N/A 5/17/2024    Procedure: Cystoscopy;  Surgeon: Dany Perez MD;  [facility]    CYSTOSTOMY, INSERT TUBE SUPRAPUBIC, COMBINED  7/12/2024    Procedure: Insertion of supra-pubic catheter;  Surgeon: Dany Perez MD;  [facility]    DILATION AND CURETTAGE, WITH ULTRASOUND GUIDANCE N/A 5/17/2024    Procedure: Dilation and curettage of the uterus with drainage of uterine fluid under ultrasound guidance, lysis of vaginal adhesions;  Surgeon: Dany Perez MD;  [facility]    EXAM UNDER ANESTHESIA PELVIC N/A 3/12/2020    Procedure: Exam under anesthsia, vaginal biopsies, possible CO2 laser of the vagina;  Surgeon: Lilliam Roy MD;  [facility]    GI SURGERY  2004    gastric bypass    HYSTERECTOMY TOTAL ABDOMINAL, BILATERAL SALPINGO-OOPHORECTOMY, COMBINED Bilateral 7/12/2024    Procedure: Diagnostic laparoscopy converted to exploratory laparotomy, total abdominal hysterectomy, bilateral salpingo-oophorectomy, lysis of adhesions >60 min;  Surgeon: Dany Perez MD;  [facility]    LASER CO2 VAGINA N/A 3/2/2015    Procedure: LASER CO2 VAGINA;  Surgeon: Mariela Abdalla MD;  Location: [facility] OR    LASER CO2 VAGINA N/A  5/24/2019    Procedure: Exam under anesthesia, vaginal biopsies, CO2 laser of the vagina;  Surgeon: Lilliam Roy MD;  Location: [facility]    LASER CO2 VAGINA N/A 6/8/2021    Procedure: Exam under anesthesia, laser ablation of the upper vagina;  Surgeon: Dany Perez MD;  Location: [facility]    REPAIR BLADDER N/A 7/12/2024    Procedure: Repair bladder;  Surgeon: Dany Perez MD;  [facility]       Allergies:      Allergies   Allergen Reactions    Ibuprofen Nausea and Vomiting    Shrimp     Sulfa Antibiotics Rash          Current Antimicrobials:     Ertapenem       Family History:     Family History   Problem Relation Age of Onset    Heart Disease Father     Heart Failure Father     No Known Problems Brother     No Known Problems Brother     No Known Problems Brother     Pancreatitis Brother     Hypertension Sister     Peripheral Vascular Disease Sister     No Known Problems Sister     No Known Problems Son     No Known Problems Daughter         Social History:     Social History     Socioeconomic History    Marital status: Single     Spouse name: Not on file    Number of children: Not on file    Years of education: Not on file    Highest education level: Not on file   Occupational History    Not on file   Tobacco Use    Smoking status: Never    Smokeless tobacco: Never   Substance and Sexual Activity    Alcohol use: Not Currently    Drug use: No    Sexual activity: Not on file   Other Topics Concern    Parent/sibling w/ CABG, MI or angioplasty before 65F 55M? Not Asked   Social History Narrative    Not on file     Social Determinants of Health     Financial Resource Strain: Not on file   Food Insecurity: No Food Insecurity (4/13/2023)    [facility]    Hunger Vital Sign     Worried About Running Out of Food in the Last Year: Never true     Ran Out of Food in the Last Year: Never true   Transportation Needs: Not on file   Physical Activity: Not on file    Stress: Not on file   Social Connections: Not on file   Interpersonal Safety: Not on file   Housing Stability: Not on file          Physical Exam:   Ranges forvital signs:  Temp:  [97.6  F (36.4  C)-98.2  F (36.8  C)] 97.6  F (36.4  C)  Pulse:  [53-61] 53  Resp:  [16] 16  BP: ()/(61-75) 109/67  SpO2:  [93 %-100 %] 100 %    Intake/Output Summary (Last 24 hours) at 7/24/2024 1416  Last data filed at 7/24/2024 1330  Gross per 24 hour   Intake 170 ml   Output 2004 ml   Net -1834 ml     Exam:  GENERAL:  well-developed, well-nourished, sitting in bed in no acute distress.   ENT:  Head is normocephalic, atraumatic. Oropharynx is moist without exudates or ulcers.  EYES:  Eyes have anicteric sclerae.    NECK:  Supple.  LUNGS:  Clear to auscultation.  CARDIOVASCULAR:  Regular rate and rhythm with no murmurs, gallops or rubs.  ABDOMEN:  Normal bowel sounds, soft. Slight TTP over suprapubic region. Suprapubic catheter and abdominal drain in place. Wound c/d/I, no rich erythema or drainage.  EXT: Extremities warm. 2+ BLE - currently wrapped and compressed.  SKIN:  No acute rashes.    NEUROLOGIC:  Grossly nonfocal.         Laboratory Data:     Reviewed.  Pertinent for:    Microbiology:  Culture   Date Value Ref Range Status   07/21/2024 2+ Bacteroides thetaiotaomicron (A)  Final     Comment:     Susceptibilities not routinely done, refer to antibiogram to view typical susceptibility profiles   07/21/2024 3+ Mixed Aerobic and Anaerobic elisa  Final   07/21/2024 2+ Klebsiella pneumoniae ESBL (A)  Final   07/21/2024 2+ Proteus mirabilis (A)  Final   07/21/2024 2+ Normal elisa  Final   07/21/2024 >100,000 CFU/mL Klebsiella pneumoniae ESBL (A)  Final   07/21/2024 10,000-50,000 CFU/mL Klebsiella pneumoniae (A)  Final   04/11/2024 >100,000 CFU/mL Mixture of Urogenital Elisa  Final   05/23/2023 >100,000 CFU/mL Escherichia coli (A)  Final     Metabolic Studies       Recent Labs   Lab Test 07/24/24  0554 07/24/24  0510  07/24/24  0136 07/23/24  2224 07/23/24  2123 07/23/24  1733 07/23/24  1216 07/23/24  0924 07/23/24  0636 07/21/24  2311 07/21/24  1542 07/16/24  0854 07/15/24  0500 07/14/24  0609 07/13/24  0615 07/13/24  0218 07/13/24  0211 07/12/24  1647 07/12/24  1444     --   --   --   --   --   --   --  140  --  139  --  133* 131*  --  139  --  140 136   POTASSIUM 4.1  --   --   --  3.8  --   --   --  3.1*  --  3.7  --  3.7 3.9  --  3.2*  --  3.2* 2.7*   CHLORIDE 103  --   --   --   --   --   --   --  105  --  100  --  97* 96*  --  99  --   --   --    CO2 26  --   --   --   --   --   --   --  27  --  29  --  25 22  --  28  --   --   --    ANIONGAP 8  --   --   --   --   --   --   --  8  --  10  --  11 13  --  12  --   --   --    BUN 8.5  --   --   --   --   --   --   --  10.6  --  15.7  --  29.7* 26.7*  --  18.3  --   --   --    CR 0.77  --   --   --   --   --   --   --  0.78  --  0.86 1.59* 2.14* 2.28*  --  1.33*  --   --   --    GFRESTIMATED 82  --   --   --   --   --   --   --  81  --  72 34* 24* 22*  --  43*  --   --   --    * 92 82 101*  --  97 142*   < > 97   < > 105*  --  82 95   < > 140*   < > 119* 122*   SAMUEL 7.8*  --   --   --   --   --   --   --  7.7*  --  8.3*  --  8.2* 8.0*  --  8.2*  --   --   --    PHOS 2.1*  --   --   --   --   --   --   --  1.7*  --   --   --   --   --   --   --   --   --   --    MAG 2.3  --   --   --   --   --   --   --  1.4*  --   --   --   --   --   --   --   --   --   --    LACT  --   --   --   --   --   --   --   --   --   --   --   --   --   --   --   --   --  0.8 0.8    < > = values in this interval not displayed.     Hepatic Studies    Recent Labs   Lab Test 07/24/24  0554 07/21/24  1542 07/14/24  0609   BILITOTAL <0.2 0.4 0.2   ALKPHOS 62 66 69   ALBUMIN 2.1* 2.5* 2.0*   AST 12 12 23   ALT <5 5 <5     Pancreatitis testing    Recent Labs   Lab Test 07/21/24  1542   LIPASE 53     Hematology Studies      Recent Labs   Lab Test 07/24/24  0554 07/23/24  0636 07/21/24  1542  07/15/24  0500 07/14/24  0609 07/13/24  0218   WBC 9.1 7.4 12.7* 11.2* 12.4* 12.9*   HGB 8.1* 7.2* 8.5* 8.9* 6.5* 7.2*   HCT 27.1* 23.3* 27.8* 28.7* 22.1* 24.8*    382 474* 385 373 347     Arterial Blood Gas Testing    Recent Labs   Lab Test 07/12/24  1647 07/12/24  1444   PH 7.55* 7.53*   PCO2 34* 36   PO2 168* 215*   HCO3 30* 31*   O2PER 33.0 42.0      Urine Studies     Recent Labs   Lab Test 07/21/24  1721 07/21/24  1705 04/11/24  1152 05/23/23  0820 02/15/23  0927   URINEPH 6.5 6.5 7.0 6.0 7.0   NITRITE Positive* Positive* Positive* Positive* Positive*   LEUKEST Large* Large* Large* Large* Small*   WBCU >182* >182* >100* >182* 10-25*         Latest Ref Rng & Units 7/24/2024     5:54 AM 7/23/2024     6:36 AM 7/21/2024     3:42 PM 7/16/2024     8:54 AM 7/15/2024     5:00 AM   Transplant Immunosuppression Labs   Creat 0.51 - 0.95 mg/dL 0.77  0.78  0.86  1.59  2.14    Urea Nitrogen 8.0 - 23.0 mg/dL 8.5  10.6  15.7   29.7    WBC 4.0 - 11.0 10e3/uL 9.1  7.4  12.7   11.2    Neutrophil %  67  87             Imaging:   CT Abdomen Pelvis w Contrast  Result Date: 7/21/2024  IMPRESSION: 1. Surgical changes of hysterectomy and bilateral salpingo-oophorectomy. Ill-defined inflammatory changes throughout the low pelvis without definitive organized fluid collection such as abscess identified. 2. Multiple foci of air seen within the abdomen, this may be due to the recent surgery or the percutaneous abdominal drain. 3. Multiple fluid-filled loops of bowel. Fluid-filled colon is seen down to level of the rectum suggestive of slow motility. However the distal ileum is decompressed with possible transition point in the anterior lower abdomen as detailed above. May represent mixed etiology of ileus with partial obstruction. Radiographic follow-up suggested. 4. Improved now mild bilateral hydronephrosis. Slightly heterogeneous enhancement of the kidneys. Mild urothelial enhancement of the ureters, this is likely reactive. 5.  Multiple enlarged retroperitoneal lymph nodes which are likely reactive. 6. Soft tissue anasarca. There is also edema involving the mesentery and pelvic soft tissues. 7. Soft tissue gas and inflammatory changes within the abdominal wall at the site of the suprapubic catheter, no organized fluid collection at this location. 8. Bladder is collapsed with Shahid catheter and suprapubic catheter in place. 9. Bladder wall is difficult to discern which may be due to adjacent inflammatory changes and timing of contrast bolus which is slightly early. Possible defect along the right posterior bladder wall with adjacent fluid and air. Of note, a bladder repair was made during surgery. Could consider follow-up CT cystogram to evaluate for any active leak in this region. I have personally reviewed the examination and initial interpretation and I agree with the findings. FORREST RAMIREZ MD   SYSTEM ID:  [id number]

## 2024-07-24 NOTE — PROGRESS NOTES
Care Management Follow Up    Length of Stay (days): 3    Expected Discharge Date: 07/26/2024     Concerns to be Addressed: discharge planning     Patient plan of care discussed at interdisciplinary rounds: Yes    Anticipated Discharge Disposition: Home Care     Anticipated Discharge Services: PCA  Anticipated Discharge DME: None    Patient/family educated on Medicare website which has current facility and service quality ratings: yes  Education Provided on the Discharge Plan:    Patient/Family in Agreement with the Plan: yes    Referrals Placed by CM/SW:  Facilities in order of preference (first, second...etc.)    [facility]  [address], [place] [address]  P: [phone]  7/24: Initial SNF sent via [facility]    [facility]  [address] [address]  P: [phone]  7/24: Initial SNF sent via Destinations    [facility]  [address], [place] [address]  P: [phone]  7/24: Initial SNF sent via Destinations    [facility]  [address] [address]  P: [phone]  7/24: Initial SNF sent via Destinations    [facility]  [address] [address]  P: [phone]  7/24: Initial SNF sent via Destinations    [facility]  [address] [address]  P: [phone]  7/24: Initial SNF sent via Destinations    Private pay costs discussed: Not applicable    Additional Information:    CHW delegated to  list of facilities from [facility] and to send in new referrals on her behalf. List of facilities provided above and all are written in order of preference. CHW to continue to follow up until an accepting facility is found.        Jeanette De Santiago  5A/5C Community Health Worker  [place], [place]  [email or web address]  [phone]

## 2024-07-24 NOTE — TELEPHONE ENCOUNTER
Reason for visit: post op --per nelson message to rod     Dx/Hx/Sx: hysterectomy    Records/imaging/labs/orders: in epic     At Rooming: standard

## 2024-07-24 NOTE — PLAN OF CARE
"[id number]    /75 (BP Location: Left arm)   Pulse 60   Temp 98  F (36.7  C) (Oral)   Resp 16   Ht 1.54 m (5' 0.63\")   Wt 72.2 kg (159 lb 3.2 oz)   SpO2 98%   BMI 30.45 kg/m      Afebrile, VSS on RA. Pain managed with PRN oxycodone x2. Denied N/V and SOB. All electrolyte replacements done on eves, ok by provider to draw magnesium and phosphorus with morning labs due to delay of magnesium with PIV loss. Potassium recheck came back 3.8 with no further replacements needed. Dressing replacements done on matilde drain and suprapubic catheter. Poor PO intake. Shahid positional and AUOP from it and suprapubic catheter. LBM 7/23. Continue with POC.     Goal Outcome Evaluation:      Plan of Care Reviewed With: patient    Overall Patient Progress: no changeOverall Patient Progress: no change           "

## 2024-07-25 ENCOUNTER — APPOINTMENT (OUTPATIENT)
Dept: OCCUPATIONAL THERAPY | Facility: CLINIC | Age: 69
DRG: 863 | End: 2024-07-25
Payer: COMMERCIAL

## 2024-07-25 ENCOUNTER — APPOINTMENT (OUTPATIENT)
Dept: PHYSICAL THERAPY | Facility: CLINIC | Age: 69
DRG: 863 | End: 2024-07-25
Payer: COMMERCIAL

## 2024-07-25 LAB
ANION GAP SERPL CALCULATED.3IONS-SCNC: 6 MMOL/L (ref 7–15)
BUN SERPL-MCNC: 8.2 MG/DL (ref 8–23)
CALCIUM SERPL-MCNC: 7.7 MG/DL (ref 8.8–10.4)
CHLORIDE SERPL-SCNC: 102 MMOL/L (ref 98–107)
CREAT FLD-MCNC: 0.9 MG/DL
CREAT SERPL-MCNC: 0.96 MG/DL (ref 0.51–0.95)
CREATININE BODY FLUID SOURCE: NORMAL
EGFRCR SERPLBLD CKD-EPI 2021: 63 ML/MIN/1.73M2
ERYTHROCYTE [DISTWIDTH] IN BLOOD BY AUTOMATED COUNT: 24.5 % (ref 10–15)
GLUCOSE SERPL-MCNC: 93 MG/DL (ref 70–99)
HCO3 SERPL-SCNC: 27 MMOL/L (ref 22–29)
HCT VFR BLD AUTO: 25.7 % (ref 35–47)
HGB BLD-MCNC: 7.8 G/DL (ref 11.7–15.7)
MAGNESIUM SERPL-MCNC: 1.8 MG/DL (ref 1.7–2.3)
MCH RBC QN AUTO: 23.4 PG (ref 26.5–33)
MCHC RBC AUTO-ENTMCNC: 30.4 G/DL (ref 31.5–36.5)
MCV RBC AUTO: 77 FL (ref 78–100)
PHOSPHATE SERPL-MCNC: 3.1 MG/DL (ref 2.5–4.5)
PLATELET # BLD AUTO: 400 10E3/UL (ref 150–450)
POTASSIUM SERPL-SCNC: 4.1 MMOL/L (ref 3.4–5.3)
RBC # BLD AUTO: 3.33 10E6/UL (ref 3.8–5.2)
SODIUM SERPL-SCNC: 135 MMOL/L (ref 135–145)
WBC # BLD AUTO: 9 10E3/UL (ref 4–11)

## 2024-07-25 PROCEDURE — 97530 THERAPEUTIC ACTIVITIES: CPT | Mod: GP | Performed by: PHYSICAL THERAPIST

## 2024-07-25 PROCEDURE — 97140 MANUAL THERAPY 1/> REGIONS: CPT | Mod: GO | Performed by: OCCUPATIONAL THERAPIST

## 2024-07-25 PROCEDURE — 36415 COLL VENOUS BLD VENIPUNCTURE: CPT

## 2024-07-25 PROCEDURE — 82570 ASSAY OF URINE CREATININE: CPT

## 2024-07-25 PROCEDURE — 85027 COMPLETE CBC AUTOMATED: CPT

## 2024-07-25 PROCEDURE — 80048 BASIC METABOLIC PNL TOTAL CA: CPT

## 2024-07-25 PROCEDURE — 250N000013 HC RX MED GY IP 250 OP 250 PS 637: Performed by: OBSTETRICS & GYNECOLOGY

## 2024-07-25 PROCEDURE — 97116 GAIT TRAINING THERAPY: CPT | Mod: GP | Performed by: PHYSICAL THERAPIST

## 2024-07-25 PROCEDURE — 83735 ASSAY OF MAGNESIUM: CPT

## 2024-07-25 PROCEDURE — 99231 SBSQ HOSP IP/OBS SF/LOW 25: CPT | Mod: 24 | Performed by: OBSTETRICS & GYNECOLOGY

## 2024-07-25 PROCEDURE — 84100 ASSAY OF PHOSPHORUS: CPT

## 2024-07-25 PROCEDURE — 120N000002 HC R&B MED SURG/OB UMMC

## 2024-07-25 PROCEDURE — 99232 SBSQ HOSP IP/OBS MODERATE 35: CPT | Mod: 24 | Performed by: STUDENT IN AN ORGANIZED HEALTH CARE EDUCATION/TRAINING PROGRAM

## 2024-07-25 PROCEDURE — 250N000013 HC RX MED GY IP 250 OP 250 PS 637: Performed by: NURSE PRACTITIONER

## 2024-07-25 PROCEDURE — 97535 SELF CARE MNGMENT TRAINING: CPT | Mod: GO

## 2024-07-25 PROCEDURE — 250N000013 HC RX MED GY IP 250 OP 250 PS 637

## 2024-07-25 RX ORDER — SULFAMETHOXAZOLE/TRIMETHOPRIM 800-160 MG
1 TABLET ORAL 2 TIMES DAILY
Status: ON HOLD | DISCHARGE
Start: 2024-07-25 | End: 2024-08-09

## 2024-07-25 RX ORDER — POLYETHYLENE GLYCOL 3350 17 G/17G
17 POWDER, FOR SOLUTION ORAL 2 TIMES DAILY PRN
Status: ON HOLD | DISCHARGE
Start: 2024-07-25 | End: 2024-08-09

## 2024-07-25 RX ORDER — METHOCARBAMOL 500 MG/1
500 TABLET, FILM COATED ORAL EVERY 6 HOURS PRN
Status: ON HOLD | DISCHARGE
Start: 2024-07-25 | End: 2024-08-09

## 2024-07-25 RX ORDER — METRONIDAZOLE 500 MG/1
500 TABLET ORAL 3 TIMES DAILY
Status: ON HOLD | DISCHARGE
Start: 2024-07-25 | End: 2024-08-09

## 2024-07-25 RX ORDER — OXYCODONE HYDROCHLORIDE 5 MG/1
5 TABLET ORAL EVERY 4 HOURS PRN
Qty: 18 TABLET | Refills: 0 | Status: ON HOLD | OUTPATIENT
Start: 2024-07-25 | End: 2024-08-09

## 2024-07-25 RX ADMIN — OXYCODONE HYDROCHLORIDE 5 MG: 5 TABLET ORAL at 21:17

## 2024-07-25 RX ADMIN — METRONIDAZOLE 500 MG: 500 TABLET ORAL at 20:27

## 2024-07-25 RX ADMIN — OXYCODONE HYDROCHLORIDE 5 MG: 5 TABLET ORAL at 07:56

## 2024-07-25 RX ADMIN — OXYCODONE HYDROCHLORIDE 5 MG: 5 TABLET ORAL at 12:45

## 2024-07-25 RX ADMIN — OXYBUTYNIN CHLORIDE 5 MG: 5 TABLET ORAL at 13:53

## 2024-07-25 RX ADMIN — SULFAMETHOXAZOLE AND TRIMETHOPRIM 1 TABLET: 800; 160 TABLET ORAL at 08:01

## 2024-07-25 RX ADMIN — APIXABAN 2.5 MG: 2.5 TABLET, FILM COATED ORAL at 07:57

## 2024-07-25 RX ADMIN — METRONIDAZOLE 500 MG: 500 TABLET ORAL at 07:59

## 2024-07-25 RX ADMIN — SULFAMETHOXAZOLE AND TRIMETHOPRIM 1 TABLET: 800; 160 TABLET ORAL at 20:26

## 2024-07-25 RX ADMIN — APIXABAN 2.5 MG: 2.5 TABLET, FILM COATED ORAL at 20:26

## 2024-07-25 RX ADMIN — DICLOFENAC SODIUM 2 G: 10 GEL TOPICAL at 16:05

## 2024-07-25 RX ADMIN — METRONIDAZOLE 500 MG: 500 TABLET ORAL at 13:53

## 2024-07-25 RX ADMIN — OXYBUTYNIN CHLORIDE 5 MG: 5 TABLET ORAL at 07:57

## 2024-07-25 RX ADMIN — OXYBUTYNIN CHLORIDE 5 MG: 5 TABLET ORAL at 20:27

## 2024-07-25 RX ADMIN — DICLOFENAC SODIUM 2 G: 10 GEL TOPICAL at 21:17

## 2024-07-25 RX ADMIN — ATENOLOL 100 MG: 50 TABLET ORAL at 08:01

## 2024-07-25 RX ADMIN — FUROSEMIDE 40 MG: 40 TABLET ORAL at 07:57

## 2024-07-25 ASSESSMENT — ACTIVITIES OF DAILY LIVING (ADL)
ADLS_ACUITY_SCORE: 44
ADLS_ACUITY_SCORE: 43
ADLS_ACUITY_SCORE: 44
ADLS_ACUITY_SCORE: 43
ADLS_ACUITY_SCORE: 45
ADLS_ACUITY_SCORE: 44
ADLS_ACUITY_SCORE: 45
ADLS_ACUITY_SCORE: 44
ADLS_ACUITY_SCORE: 44
ADLS_ACUITY_SCORE: 45
ADLS_ACUITY_SCORE: 45
ADLS_ACUITY_SCORE: 44
ADLS_ACUITY_SCORE: 43
ADLS_ACUITY_SCORE: 45
ADLS_ACUITY_SCORE: 43
ADLS_ACUITY_SCORE: 43
ADLS_ACUITY_SCORE: 44
ADLS_ACUITY_SCORE: 45
ADLS_ACUITY_SCORE: 43

## 2024-07-25 NOTE — PROGRESS NOTES
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     Tolerating TMP-SMX and metronidazole well apart from some concerns about pill size - able to take them with water/food so far. No apparent rash, so low concern for developing reaction to TMP-SMX. Would continue both abx through 7/31 - this will make total abx therapy (including ertapenem) of 10 days for SSTI and also fully treat UTI.    Ronald Rodriguez MD  Division of Infectious Diseases and International Medicine  P: [phone]        Interval History:     Seen and examined. No acute events overnight. She has some reservations about the size of the abx pills but says she was able to get them down with some water and/or food.         Review of Systems:     Full 9 pt ROS  obtained and negative unless noted above in assessment and interval history.         Current Antimicrobials     TMP-SMX  Metronidazole         Physical Exam:   Ranges for vital signs:  Temp:  [97.9  F (36.6  C)-98.6  F (37  C)] 98.6  F (37  C)  Pulse:  [52-67] 67  Resp:  [16-18] 16  BP: ()/(55-78) 111/78  SpO2:  [100 %] 100 %    Intake/Output Summary (Last 24 hours) at 7/25/2024 1248  Last data filed at 7/25/2024 1148  Gross per 24 hour   Intake 1040 ml   Output 1305 ml   Net -265 ml     Exam:  GENERAL:  well-developed, well-nourished, sitting in bed in no acute distress.   ENT:  Head is normocephalic, atraumatic. Oropharynx is moist without exudates or ulcers.  EYES:  Eyes have anicteric sclerae.    LUNGS:  Breathing easily on room air  ABDOMEN:  Normal bowel sounds, soft.  EXT: Extremities warm. 2+ BLE  SKIN:  No acute rashes.    NEUROLOGIC:  Grossly nonfocal.         Laboratory Data:   Reviewed.  Pertinent for:    Microbiology:  Culture   Date Value Ref Range Status   07/21/2024 2+ Bacteroides thetaiotaomicron (A)  Final     Comment:     Susceptibilities not routinely done, refer to antibiogram to view typical susceptibility profiles   07/21/2024 3+ Mixed Aerobic and Anaerobic elisa  Final   07/21/2024 2+ Klebsiella pneumoniae ESBL (A)  Final   07/21/2024 2+ Proteus mirabilis (A)  Final   07/21/2024 2+ Normal elisa  Final   07/21/2024 >100,000 CFU/mL Klebsiella pneumoniae ESBL (A)  Final   07/21/2024 10,000-50,000 CFU/mL Klebsiella pneumoniae (A)  Final   04/11/2024 >100,000 CFU/mL Mixture of Urogenital Elisa  Final   05/23/2023 >100,000 CFU/mL Escherichia coli (A)  Final     Metabolic Studies       Recent Labs   Lab Test 07/25/24  0704 07/24/24  0554 07/24/24  0510 07/24/24  0136 07/23/24  2224 07/23/24  2123 07/23/24  1733 07/23/24  0924 07/23/24  0636 07/21/24  2311 07/21/24  1542 07/16/24  0854 07/15/24  0500 07/14/24  0609 07/13/24  0211 07/12/24  1647 07/12/24  1444    137  --   --   --   --    --   --  140  --  139  --  133* 131*   < > 140 136   POTASSIUM 4.1 4.1  --   --   --  3.8  --   --  3.1*  --  3.7  --  3.7 3.9   < > 3.2* 2.7*   CHLORIDE 102 103  --   --   --   --   --   --  105  --  100  --  97* 96*   < >  --   --    CO2 27 26  --   --   --   --   --   --  27  --  29  --  25 22   < >  --   --    ANIONGAP 6* 8  --   --   --   --   --   --  8  --  10  --  11 13   < >  --   --    BUN 8.2 8.5  --   --   --   --   --   --  10.6  --  15.7  --  29.7* 26.7*   < >  --   --    CR 0.96* 0.77  --   --   --   --   --   --  0.78  --  0.86 1.59* 2.14* 2.28*   < >  --   --    GFRESTIMATED 63 82  --   --   --   --   --   --  81  --  72 34* 24* 22*   < >  --   --    GLC 93 103* 92 82 101*  --  97   < > 97   < > 105*  --  82 95   < > 119* 122*   SAMUEL 7.7* 7.8*  --   --   --   --   --   --  7.7*  --  8.3*  --  8.2* 8.0*   < >  --   --    PHOS 3.1 2.1*  --   --   --   --   --   --  1.7*   < >  --   --   --   --   --   --   --    MAG 1.8 2.3  --   --   --   --   --   --  1.4*  --   --   --   --   --   --   --   --    LACT  --   --   --   --   --   --   --   --   --   --   --   --   --   --   --  0.8 0.8    < > = values in this interval not displayed.     Hepatic Studies    Recent Labs   Lab Test 07/24/24  0554 07/21/24  1542 07/14/24  0609   BILITOTAL <0.2 0.4 0.2   ALKPHOS 62 66 69   ALBUMIN 2.1* 2.5* 2.0*   AST 12 12 23   ALT <5 5 <5     Pancreatitis testing    Recent Labs   Lab Test 07/21/24  1542   LIPASE 53     Hematology Studies      Recent Labs   Lab Test 07/25/24  0704 07/24/24  0554 07/23/24  0636 07/21/24  1542 07/15/24  0500 07/14/24  0609   WBC 9.0 9.1 7.4 12.7* 11.2* 12.4*   HGB 7.8* 8.1* 7.2* 8.5* 8.9* 6.5*   HCT 25.7* 27.1* 23.3* 27.8* 28.7* 22.1*    435 382 474* 385 373     Arterial Blood Gas Testing    Recent Labs   Lab Test 07/12/24  1647 07/12/24  1444   PH 7.55* 7.53*   PCO2 34* 36   PO2 168* 215*   HCO3 30* 31*   O2PER 33.0 42.0      Urine Studies     Recent Labs   Lab Test 07/21/24  1721  07/21/24  1705 04/11/24  1152 05/23/23  0820 02/15/23  0927   URINEPH 6.5 6.5 7.0 6.0 7.0   NITRITE Positive* Positive* Positive* Positive* Positive*   LEUKEST Large* Large* Large* Large* Small*   WBCU >182* >182* >100* >182* 10-25*         Latest Ref Rng & Units 7/25/2024     7:04 AM 7/24/2024     5:54 AM 7/23/2024     6:36 AM 7/21/2024     3:42 PM 7/16/2024     8:54 AM   Transplant Immunosuppression Labs   Creat 0.51 - 0.95 mg/dL 0.96  0.77  0.78  0.86  1.59    Urea Nitrogen 8.0 - 23.0 mg/dL 8.2  8.5  10.6  15.7     WBC 4.0 - 11.0 10e3/uL 9.0  9.1  7.4  12.7     Neutrophil %   67  87

## 2024-07-25 NOTE — PROGRESS NOTES
Care Management Follow Up    Length of Stay (days): 4    Expected Discharge Date: 07/26/2024     Concerns to be Addressed: discharge planning     Patient plan of care discussed at interdisciplinary rounds: Yes    Anticipated Discharge Disposition: Home Care     Anticipated Discharge Services: PCA  Anticipated Discharge DME: None    Patient/family educated on Medicare website which has current facility and service quality ratings: yes  Education Provided on the Discharge Plan:    Patient/Family in Agreement with the Plan: yes    Referrals Placed by CM/SW:    ACCEPTED:  [facility] [address], [place] [address]  P: [phone]    Private pay costs discussed: Not applicable    Additional Information:    CHW called the facilities for follow up and 2 facilities accepted, but since [facility] was patient's first choice, she chose that TCU. [facility] can take [facility] on Friday 7/26, in which [facility] is aware of. They said they can take her between the times of 10:30 am-5:30 pm. Transportation will be scheduled on behalf of [facility] on the day of discharge. Provider and bedside nurse made aware. CHW to follow up.         Jeanette De Santiago  5A/5C Community Health Worker  [place], [place]  [email or web address]  [phone]

## 2024-07-25 NOTE — PROGRESS NOTES
Care Management Follow Up    Length of Stay (days): 4    Expected Discharge Date: 07/26/2024     Concerns to be Addressed: discharge planning     Patient plan of care discussed at interdisciplinary rounds: Yes    Anticipated Discharge Disposition: Home Care     Anticipated Discharge Services: PCA  Anticipated Discharge DME: None    Patient/family educated on Medicare website which has current facility and service quality ratings: yes  Education Provided on the Discharge Plan:    Patient/Family in Agreement with the Plan: yes    Referrals Placed by CM/SW: External Care Coordination  Private pay costs discussed: Not applicable    Additional Information:    CHW completed a preadmission screening on [facility] [employer] Line for the accepting facility. PAS number provided to SW.    [id number]    CHW also delegated to schedule transportation on behalf of the patient. CHW to provide patient and beside nurse's phone number to the medical transportation company. CHW to inform both patient and bedside/charge nurse about transportation. CHW to continue to follow up if needed.     Destination:  [facility] [address], [place] [address]  [phone]    Cab Company:   [employer] [phone]    Date/Time of Arrival:  Friday 7/26/2024  Service window 1:10-1:50 pm      Jeanette De Santiago  5A/5C Community Health Worker  [place], [place]  [email or web address]  [phone]

## 2024-07-25 NOTE — PROGRESS NOTES
Brief Progress Note    In to see patient for PM rounds. Patient has been doing ok. Has been eating well without nausea or vomiting. Notes she felt like she had difficulty keeping her oral antibiotics down with swallowing this evening since the pills are large. This was her first dose of Flagyl and bactrim. Encouraged her to take the pills with a meal and to consider ask for antiemetic before taking the pills if needed. No other concerns or new symptoms since taking these medications. Had a BM today. Small pain well managed with PO medication. No other concerns.     Reviewed plan of care to continue oral antibiotics for likely a week, though full duration length to be finalized with ID team. SW placed referrals today; will follow-up tomorrow for possible TCU placement.     No changes to plan of care this evening.     Nakita Mcfarlane MD  Obstetrics & Gynecology, PGY-3  7/24/2024 11:19 PM

## 2024-07-25 NOTE — PLAN OF CARE
Goal Outcome Evaluation:      Plan of Care Reviewed With: patient    Overall Patient Progress: no changeOverall Patient Progress: no change    Outcome Evaluation: [id number]    HD#4. Slightly soft BPs, asymptomatic. Other VSS. Most of Pt's pain is to coccyx and abdomen. PRN IV dilaudid given x1 and PRN oxycodone given x1. Had nausea after getting up to the chair, when pain was a 9/10. PRN IV zofran given with relief. Denies SOB/cough. Up with 1, GB and walker. Fair PO intake. LBM today, 7/24. Sahhid and suprapubic remain in place. Has x1 TINY drain in minimal output. Paste reapplied to wound on coccyx. Pt cooperative with repositioning at the beginning of the shift, but after finding a position where she was comfortable, she refused repositioning afterwards. Pt reported lymph wraps started to bother her and requested to have them removed. Feet elevated with a pillow in bed. Education completed about importance of repositioning and lymph wraps. Pt stated she understood, but still declined those interventions. IV Invanz discontinued, PO bactrim and flagyl ordered. Pt struggled to get antibotic pills down d/t their size. Pt took pills with applesauce. Continue with POC.

## 2024-07-25 NOTE — PROGRESS NOTES
Gynecology Oncology Progress Note  07/25/2024    24 hour events:   - Transitioned from ertrapenem to PO Bactrim, Flagyl  - TCU referrals placed by BANDAR    Subjective: Patient is doing well.  Pain is well controlled with oral pain medications.  Tolerating PO, had a small BM yesterday. No nausea or vomiting. No concerns or new symptoms. No fevers or chills. Excited to shower this morning.     Objective:   Patient Vitals for the past 24 hrs:   BP Temp Temp src Pulse Resp SpO2 Weight   07/25/24 0401 93/55 98.1  F (36.7  C) Oral 58 16 -- --   07/25/24 0000 106/70 97.9  F (36.6  C) Oral 52 16 100 % --   07/24/24 1635 113/63 98  F (36.7  C) Oral -- 16 100 % --   07/24/24 1105 109/67 97.6  F (36.4  C) Oral 53 16 100 % --   07/24/24 0807 98/64 97.9  F (36.6  C) Oral 53 16 100 % 72.2 kg (159 lb 2.8 oz)     General: NAD, comfortable  Cardiovascular: regular rate  Respiratory: normal rate and work of breathing on room air  Abdomen: soft, appropriately tender, non-distended  Incision: superficial dehiscence with small granulation tissue  Drains: suprapubic catheter draining clear yellow urine, kent draining clear yellow urine, TINY drain with small serosanguinous output  Extremities: warm, well-perfused, nontender, SCDs in place    Assessment/Plan: 69 year old POD#13 s/p XL, SNEHA, BSO, lysis of adhesions complicated by cystotomy w/ suprapubic catheter placement in the setting of stage II endometrial serous carcinoma now HD#5 admitted due to concern for small bowel obstruction, UTI, and superficial surgical site infection.      # Endometrial serous carcinoma  - Scheduled to follow-up with Dr. Perez on 7/30/24 to discuss chemotherapy     # Suprapubic catheter  # UTI   # Possible bladder leak vs vesico-vaginal fistula  - Urine culture with >100k ESBL Klebsiella. Transitioned from ertapenem to PO Bactrim and Flagyl per ID team recommendation with culture sensitivities. Will complete 10 day course of antibiotics.  - Per urology no  further workup/intervention for possible persistent cystotomy/fistula given her catheters are draining well  -Will send TINY drain creatinine and discuss with urology removal of TINY prior to discharge     # Superficial surgical site infection  - Wound culture with ESBL Klebsiella, Proteus, and Bacteroides. Abx plan as above.     # Chronic pain  # Osteoarthritis  - Scheduled tylenol, PRN oxycodone     # Hypertension  - Continue PTA atenolol and lasix      # Atrial fibrillation  # Chronic anticoagulation  - Patient currently in normal sinus rhythm   - PTA apixaban      # Paranoid schizophrenia  - Continue PTA Seroquel, Zoloft, cogentin, and Ambien      # History of gastric bypass  - Avoid NSAIDs    # Constipation  - Scheduled Miralax added     Code: Full  PPx: SCDs, PTA Eliquis    Dispo: to TCU pending bed availability     Please page the Gynecology Oncology team [phone] with questions or concerns.    Nakita Mcfarlane MD  Obstetrics & Gynecology, PGY-3  7/25/2024 6:15 AM     ICorby MD personally examined and evaluated this patient on 07/25/24.  I discussed the patient with the resident and care team, and agree with the assessment and plan of care as documented in the residents note above.    I personally reviewed vital signs, laboratory values and imaging results.        Lilliam Webb MD  Gynecologic Oncology  [facility]

## 2024-07-25 NOTE — PROGRESS NOTES
[id number] VSS. A&Ox4. Denies pain or nausea. Pt currently resting comfortably, no other complaints at this time. Will continue w/ POC.

## 2024-07-25 NOTE — PLAN OF CARE
"BP 98/66 (BP Location: Right arm)   Pulse 57   Temp 98.7  F (37.1  C) (Oral)   Resp 16   Ht 1.54 m (5' 0.63\")   Wt 75 kg (165 lb 6.4 oz)   SpO2 100%   BMI 31.63 kg/m      Afebrile; VSS on RA. Lower abdominal/vaginal pain controlled with PRN Oxycodone x2 and Tucks pads in use. No complaints of nausea. Eating and drinking. Shahid and suprapubic catheters patent with adequate UOP. TINY with minimal output; sent for testing per orders. Worked with PT and OT today. Showered. Plan to discharge to TCU tomorrow; see SW note for more information. Continue POC.     Goal Outcome Evaluation:    Plan of Care Reviewed With: patient  Overall Patient Progress: improvingOverall Patient Progress: improving  Problem: Adult Inpatient Plan of Care  Goal: Plan of Care Review  Description: The Plan of Care Review/Shift note should be completed every shift.  The Outcome Evaluation is a brief statement about your assessment that the patient is improving, declining, or no change.  This information will be displayed automatically on your shift  note.  Outcome: Progressing  Flowsheets (Taken 7/25/2024 1607)  Plan of Care Reviewed With: patient  Overall Patient Progress: improving  Goal: Patient-Specific Goal (Individualized)  Description: You can add care plan individualizations to a care plan. Examples of Individualization might be:  \"Parent requests to be called daily at 9am for status\", \"I have a hard time hearing out of my right ear\", or \"Do not touch me to wake me up as it startles  me\".  Outcome: Progressing  Goal: Absence of Hospital-Acquired Illness or Injury  Outcome: Progressing  Intervention: Identify and Manage Fall Risk  Recent Flowsheet Documentation  Taken 7/25/2024 0900 by Alicia Perez RN  Safety Promotion/Fall Prevention:   activity supervised   nonskid shoes/slippers when out of bed   patient and family education   safety round/check completed   room organization consistent  Intervention: Prevent Skin Injury  Recent " Flowsheet Documentation  Taken 7/25/2024 0900 by Alicia Perez RN  Skin Protection: adhesive use limited  Device Skin Pressure Protection: adhesive use limited  Intervention: Prevent Infection  Recent Flowsheet Documentation  Taken 7/25/2024 0900 by Alicia Perez RN  Infection Prevention:   cohorting utilized   hand hygiene promoted   rest/sleep promoted   single patient room provided  Goal: Optimal Comfort and Wellbeing  Outcome: Progressing  Intervention: Monitor Pain and Promote Comfort  Recent Flowsheet Documentation  Taken 7/25/2024 0753 by Alicia Perez RN  Pain Management Interventions:   medication (see MAR)   repositioned  Goal: Readiness for Transition of Care  Outcome: Progressing     Problem: Risk for Delirium  Goal: Optimal Coping  Outcome: Progressing  Goal: Improved Behavioral Control  Outcome: Progressing  Intervention: Minimize Safety Risk  Recent Flowsheet Documentation  Taken 7/25/2024 0900 by Alicia Perez RN  Enhanced Safety Measures:   room near unit station   review medications for side effects with activity  Goal: Improved Attention and Thought Clarity  Outcome: Progressing  Goal: Improved Sleep  Outcome: Progressing     Problem: Intestinal Obstruction  Goal: Optimal Bowel Function  Outcome: Progressing  Goal: Fluid and Electrolyte Balance  Outcome: Progressing  Goal: Absence of Infection Signs and Symptoms  Outcome: Progressing  Intervention: Prevent or Manage Infection  Recent Flowsheet Documentation  Taken 7/25/2024 0900 by Alicia Perez RN  Isolation Precautions: contact precautions maintained  Goal: Optimize Nutrition Status  Outcome: Progressing  Goal: Optimal Pain Control and Function  Outcome: Progressing  Intervention: Prevent or Manage Pain  Recent Flowsheet Documentation  Taken 7/25/2024 0753 by Alicia Perez RN  Pain Management Interventions:   medication (see MAR)   repositioned     Problem: Care Coordination Assessment  Goal: Care Coordinator  Assessment  Description: -Safe mobility plan  -Pain managed for post acute setting (IV or oral)  -Eliminating regularly or plan for management  -Eliminating regularly or plan for management  -Tolerates diet  -Safe discharge placement  -Meds/fluids managed appropriately post acute setting  -Vital signs stable for 24 hours - Labs/test results in acceptable range or post discharge follow up  -Oxygen needs are met for post discharge setting  -Skin integrity or plan for managing in post discharge setting  Outcome: Progressing  Flowsheets  Taken 7/25/2024 0900  Last Bowel Movement: 07/24/24  Passing flatus: yes  Taken 7/25/2024 0753  O2 Device: None (Room air)

## 2024-07-26 ENCOUNTER — APPOINTMENT (OUTPATIENT)
Dept: OCCUPATIONAL THERAPY | Facility: CLINIC | Age: 69
DRG: 863 | End: 2024-07-26
Payer: COMMERCIAL

## 2024-07-26 VITALS
BODY MASS INDEX: 31.23 KG/M2 | HEIGHT: 61 IN | SYSTOLIC BLOOD PRESSURE: 102 MMHG | WEIGHT: 165.4 LBS | HEART RATE: 58 BPM | DIASTOLIC BLOOD PRESSURE: 61 MMHG | RESPIRATION RATE: 17 BRPM | OXYGEN SATURATION: 100 % | TEMPERATURE: 97.9 F

## 2024-07-26 LAB
ANION GAP SERPL CALCULATED.3IONS-SCNC: 7 MMOL/L (ref 7–15)
BUN SERPL-MCNC: 9.6 MG/DL (ref 8–23)
CALCIUM SERPL-MCNC: 7.8 MG/DL (ref 8.8–10.4)
CHLORIDE SERPL-SCNC: 102 MMOL/L (ref 98–107)
CREAT SERPL-MCNC: 1.05 MG/DL (ref 0.51–0.95)
EGFRCR SERPLBLD CKD-EPI 2021: 57 ML/MIN/1.73M2
GLUCOSE BLDC GLUCOMTR-MCNC: 112 MG/DL (ref 70–99)
GLUCOSE SERPL-MCNC: 92 MG/DL (ref 70–99)
HCO3 SERPL-SCNC: 25 MMOL/L (ref 22–29)
MAGNESIUM SERPL-MCNC: 1.7 MG/DL (ref 1.7–2.3)
PHOSPHATE SERPL-MCNC: 3 MG/DL (ref 2.5–4.5)
POTASSIUM SERPL-SCNC: 4.3 MMOL/L (ref 3.4–5.3)
SODIUM SERPL-SCNC: 134 MMOL/L (ref 135–145)

## 2024-07-26 PROCEDURE — 250N000013 HC RX MED GY IP 250 OP 250 PS 637: Performed by: NURSE PRACTITIONER

## 2024-07-26 PROCEDURE — 250N000013 HC RX MED GY IP 250 OP 250 PS 637: Performed by: OBSTETRICS & GYNECOLOGY

## 2024-07-26 PROCEDURE — 250N000013 HC RX MED GY IP 250 OP 250 PS 637

## 2024-07-26 PROCEDURE — 99231 SBSQ HOSP IP/OBS SF/LOW 25: CPT | Mod: 24 | Performed by: OBSTETRICS & GYNECOLOGY

## 2024-07-26 PROCEDURE — 250N000011 HC RX IP 250 OP 636: Performed by: OBSTETRICS & GYNECOLOGY

## 2024-07-26 PROCEDURE — 83735 ASSAY OF MAGNESIUM: CPT

## 2024-07-26 PROCEDURE — 36415 COLL VENOUS BLD VENIPUNCTURE: CPT

## 2024-07-26 PROCEDURE — 84100 ASSAY OF PHOSPHORUS: CPT

## 2024-07-26 PROCEDURE — 80048 BASIC METABOLIC PNL TOTAL CA: CPT

## 2024-07-26 PROCEDURE — 97535 SELF CARE MNGMENT TRAINING: CPT | Mod: GO | Performed by: OCCUPATIONAL THERAPIST

## 2024-07-26 RX ADMIN — OXYCODONE HYDROCHLORIDE 5 MG: 5 TABLET ORAL at 02:59

## 2024-07-26 RX ADMIN — OXYBUTYNIN CHLORIDE 5 MG: 5 TABLET ORAL at 09:07

## 2024-07-26 RX ADMIN — APIXABAN 2.5 MG: 2.5 TABLET, FILM COATED ORAL at 09:06

## 2024-07-26 RX ADMIN — ACETAMINOPHEN 650 MG: 325 TABLET, FILM COATED ORAL at 09:08

## 2024-07-26 RX ADMIN — METRONIDAZOLE 500 MG: 500 TABLET ORAL at 09:06

## 2024-07-26 RX ADMIN — DICLOFENAC SODIUM 2 G: 10 GEL TOPICAL at 09:12

## 2024-07-26 RX ADMIN — ATENOLOL 100 MG: 50 TABLET ORAL at 09:05

## 2024-07-26 RX ADMIN — OXYCODONE HYDROCHLORIDE 5 MG: 5 TABLET ORAL at 09:04

## 2024-07-26 RX ADMIN — OXYCODONE HYDROCHLORIDE 5 MG: 5 TABLET ORAL at 12:59

## 2024-07-26 RX ADMIN — ONDANSETRON 4 MG: 2 INJECTION INTRAMUSCULAR; INTRAVENOUS at 08:46

## 2024-07-26 RX ADMIN — ACETAMINOPHEN 650 MG: 325 TABLET, FILM COATED ORAL at 12:59

## 2024-07-26 RX ADMIN — FUROSEMIDE 40 MG: 40 TABLET ORAL at 09:07

## 2024-07-26 RX ADMIN — SULFAMETHOXAZOLE AND TRIMETHOPRIM 1 TABLET: 800; 160 TABLET ORAL at 09:06

## 2024-07-26 ASSESSMENT — ACTIVITIES OF DAILY LIVING (ADL)
ADLS_ACUITY_SCORE: 44

## 2024-07-26 NOTE — PLAN OF CARE
"Goal Outcome Evaluation:    Time    BP 98/65 (BP Location: Right arm)   Pulse 57   Temp 98.1  F (36.7  C) (Oral)   Resp 16   Ht 1.54 m (5' 0.63\")   Wt 75 kg (165 lb 6.4 oz)   SpO2 100%   BMI 31.63 kg/m      Reason for admission: concern for small bowel obstruction, UTI, and superficial surgical site infection.   Activity: A!, did not get OOB  Pain: abdominal pain, managed with Oxy  Neuro: alert and oriented.   Cardiac: wnl, denies chest pan  Respiratory: denies SOB, no distress observed, on RA  GI/: no BM reported, kent and suprapubic cath. TINY drain  Diet:   Lines: PIV  Wounds: declined skin assessment, stated \"everything is fine\"  Labs/imaging:       New changes this shift:     Plan:       Continue to monitor and follow POC    "

## 2024-07-26 NOTE — PLAN OF CARE
Occupational Therapy Discharge Summary    Reason for therapy discharge:    Discharged to transitional care facility.    Progress towards therapy goal(s). See goals on Care Plan in [facility] electronic health record for goal details.  Goals partially met.  Barriers to achieving goals:   discharge from facility.    Therapy recommendation(s):    Continued therapy is recommended.  Rationale/Recommendations:  Pt to benefit from further therapy to progress functional strength needed for all mobility and ADLs..

## 2024-07-26 NOTE — PLAN OF CARE
Goal Outcome Evaluation:      Plan of Care Reviewed With: patient          Outcome Evaluation: Pt agreeable to TCU placement today Destination:  [facility] [address], [place] [id number] [phone]

## 2024-07-26 NOTE — PROGRESS NOTES
Physical Therapy Discharge Summary    Reason for therapy discharge:    Discharged to transitional care facility.    Progress towards therapy goal(s). See goals on Care Plan in [facility] electronic health record for goal details.  Goals not met.  Barriers to achieving goals:   discharge from facility.    Therapy recommendation(s):    Pt presenting below baseline 2/2 recent admssion. Pt has deficits in functional mobility but is progressing well and is howing strong motivation to get better. Current d/c recommendation is TCU to address deficits in functional mobility and to ensure safety with mobility. If pt were to d/c home, pt would need 24/7 assist and home PT/OT

## 2024-07-26 NOTE — PLAN OF CARE
"[id number]    BP 92/54 (BP Location: Right arm)   Pulse 56   Temp 98.1  F (36.7  C) (Oral)   Resp 16   Ht 1.54 m (5' 0.63\")   Wt 75 kg (165 lb 6.4 oz)   SpO2 100%   BMI 31.63 kg/m      Afebrile, hypotensive within parameters, OVSS on RA. Pain managed with PRN oxycodone x1. Denied N/V, and SOB. Assist x1 with activity. Poor PO intake this shift. Shahid positional with low output same with suprapubic catheter, educated patient on importance of oral fluids. LBM 7/25. Continue with POC.     Goal Outcome Evaluation:      Plan of Care Reviewed With: patient    Overall Patient Progress: no changeOverall Patient Progress: no change           "

## 2024-07-26 NOTE — PROGRESS NOTES
Gynecology Oncology Progress Note  07/26/2024    24 hour events:   - Accepted to TCU    Subjective: Patient is doing well.  Pain is well controlled with oral pain medications.  Tolerating PO, had a BM yesterday. No nausea or vomiting. No concerns or new symptoms. No fevers or chills.     Objective:   Patient Vitals for the past 24 hrs:   BP Temp Temp src Pulse Resp SpO2 Weight   07/26/24 0329 98/65 98.1  F (36.7  C) Oral 57 16 100 % --   07/25/24 2314 93/60 98  F (36.7  C) Oral 55 16 100 % --   07/25/24 2137 92/54 98.1  F (36.7  C) Oral 56 16 100 % --   07/25/24 1540 98/66 98.7  F (37.1  C) Oral 57 16 100 % --   07/25/24 1509 -- -- -- -- -- -- 75 kg (165 lb 6.4 oz)   07/25/24 1148 111/78 98.6  F (37  C) Oral 67 16 100 % --   07/25/24 0753 91/60 98  F (36.7  C) Oral -- 18 100 % --     General: NAD, comfortable  Cardiovascular: regular rate  Respiratory: normal rate and work of breathing on room air  Abdomen: soft, appropriately tender, non-distended  Incision: superficial dehiscence with small granulation tissue  Drains: suprapubic catheter draining clear yellow urine, kent draining clear yellow urine, TINY drain with small serosanguinous output  Extremities: warm, well-perfused, nontender, SCDs in place    Assessment/Plan: 69 year old POD#14 s/p XL, SNEHA, BSO, lysis of adhesions complicated by cystotomy w/ suprapubic catheter placement in the setting of stage II endometrial serous carcinoma now HD#6 admitted due to concern for small bowel obstruction (resolved), UTI, and superficial surgical site infection.      # Endometrial serous carcinoma  - Scheduled to follow-up with Dr. Perez on 7/30/24 to discuss chemotherapy     # Suprapubic catheter  # UTI   # Possible bladder leak vs vesico-vaginal fistula  - Urine culture with >100k ESBL Klebsiella. Plan to complete a 10 day total course of antibiotics.  - Per Urology, no further workup/intervention for possible persistent cystotomy/fistula given her catheters are  draining well. TINY drain creatinine has been appropriate. Will plan to leave drain in place until patient follows up with Urology outpatient.     # Superficial surgical site infection  - Wound culture with ESBL Klebsiella, Proteus, and Bacteroides. Abx plan as above.     # Chronic pain  # Osteoarthritis  - Scheduled tylenol, PRN oxycodone     # Hypertension  - Continue PTA atenolol and lasix      # Atrial fibrillation  # Chronic anticoagulation  - Patient currently in normal sinus rhythm   - PTA apixaban      # Paranoid schizophrenia  - Continue PTA Seroquel, Zoloft, cogentin, and Ambien      # History of gastric bypass  - Avoid NSAIDs    # Constipation  - Scheduled Miralax added     Code: Full  PPx: SCDs, PTA Eliquis    Dispo: to TCU later today    Please page the Gynecology Oncology team [phone] with questions or concerns.    Nakita Mcfarlane MD  Obstetrics & Gynecology, PGY-3  7/26/2024 6:31 AM       ICorby MD personally examined and evaluated this patient on 07/26/24.  I discussed the patient with the resident and care team, and agree with the assessment and plan of care as documented in the residents note above.    I personally reviewed vital signs, laboratory values and imaging results.        Lilliam Webb MD  Gynecologic Oncology  [facility]

## 2024-07-26 NOTE — PROGRESS NOTES
Brief progress note    Went in to see patient for evening rounds.  Family at bedside.  Patient is feeling well and actively eating dinner.  States she ate quite a bit of food today.  Still no nausea or vomiting.  Continues to have regular bowel movements.     Her only concern this evening is intermittent vaginal irritation.  On previous exams, it had been noted that patient intermittently leaks a small amount of urine.  Reviewed that urine is irritating to the vaginal mucosa.  Reviewed strategies to help reduce the intermittent irritation including regularly changing her underwear or pad to keep her vagina dry.  Also discussed option for application of barrier cream/ointment to the outside of the vagina and vulva to additionally reduce any kind of irritation.  Patient and her family expressed understanding.    No changes to plan of care this evening.  Anticipate discharge to TCU tomorrow.    Nakita Mcfarlane MD  Obstetrics & Gynecology, PGY-3  7/25/2024 9:11 PM

## 2024-07-26 NOTE — PROGRESS NOTES
Pt declined assessment. She refused me checking her dressings.    Timmy Soriano RN on 7/25/2024 at 11:55 PM

## 2024-07-26 NOTE — PROGRESS NOTES
Pt discharged to: [facility]  Via: medical transportation company  Time: 1345  Reason not before 11am or 2 hrs after order written: transportation had a pre-set time  Accompanied by: medical transportation company staff  Belongings: remain with patient  Teaching: completed per unit protocol.   Clinic appointment: July 30th, 2024  Report called/faxed: Yes to RN taking care of patient at [facility]. Paperwork sent with transport.   Local housing: [facility]

## 2024-07-26 NOTE — PROGRESS NOTES
Care Management Discharge Note    Discharge Date: 2024  Destination:  [facility] [address], [place] [address]  [phone]    Discharge Disposition: TCU    Discharge Services: N/A    Discharge DME: N/A    Discharge Transportation:     [facility] Company:   [employer] [phone]     Date/Time of Arrival:  2024  Service window 1:10-1:50 pm    Private pay costs discussed: transportation costs    Does the patient's insurance plan have a 3 day qualifying hospital stay waiver?  No    PAS Confirmation Code: [id number]  Patient/family educated on Medicare website which has current facility and service quality ratings: yes    Education Provided on the Discharge Plan:    Persons Notified of Discharge Plans: Provider, bed side nurse, patient, patient [facility], PCP, TCU, EMS     Patient/Family in Agreement with the Plan: yes    Handoff Referral Completed: Yes    Additional Information:  Writer spoke with pt who is agreeable to go to a TCU. Pt wished she didn't have to go to rehab but understands the importance of it.     Destination:  [facility] [address], [place] [address]  [phone]     [facility] Company:   [employer] [facility] [phone]     Date/Time of Arrival:  2024  Service window 1:10-1:50 pm      BRIANNA Ramírez   Coverin A SW    Ph: [phone]    Care Management Department    Securely message me on Vocera

## 2024-07-26 NOTE — PLAN OF CARE
VSS on room air. Assist x1 with walker and gaitbelt. Patient reports abdominal pain. Pain is managed with scheduled tylenol and oxycodone. Alert and oriented x4. No BM reported. Patient declined skin assessment. Patient will discharge today to a [facility].

## 2024-07-29 ENCOUNTER — PATIENT OUTREACH (OUTPATIENT)
Dept: ONCOLOGY | Facility: CLINIC | Age: 69
End: 2024-07-29
Payer: COMMERCIAL

## 2024-07-29 ENCOUNTER — HOSPITAL ENCOUNTER (INPATIENT)
Facility: CLINIC | Age: 69
LOS: 10 days | Discharge: HOME HEALTH | DRG: 091 | End: 2024-08-09
Attending: EMERGENCY MEDICINE | Admitting: OBSTETRICS & GYNECOLOGY
Payer: COMMERCIAL

## 2024-07-29 ENCOUNTER — MEDICAL CORRESPONDENCE (OUTPATIENT)
Dept: HEALTH INFORMATION MANAGEMENT | Facility: CLINIC | Age: 69
End: 2024-07-29

## 2024-07-29 ENCOUNTER — APPOINTMENT (OUTPATIENT)
Dept: CT IMAGING | Facility: CLINIC | Age: 69
DRG: 091 | End: 2024-07-29
Payer: COMMERCIAL

## 2024-07-29 DIAGNOSIS — I48.0 PAROXYSMAL ATRIAL FIBRILLATION (H): ICD-10-CM

## 2024-07-29 DIAGNOSIS — Z90.710 S/P HYSTERECTOMY: ICD-10-CM

## 2024-07-29 DIAGNOSIS — R41.82 AMS (ALTERED MENTAL STATUS): ICD-10-CM

## 2024-07-29 DIAGNOSIS — N17.9 AKI (ACUTE KIDNEY INJURY) (H): ICD-10-CM

## 2024-07-29 DIAGNOSIS — R00.1 SINUS BRADYCARDIA: ICD-10-CM

## 2024-07-29 DIAGNOSIS — C53.8 MALIGNANT NEOPLASM OF OVERLAPPING SITES OF CERVIX (H): ICD-10-CM

## 2024-07-29 DIAGNOSIS — C54.9 CARCINOSARCOMA OF BODY OF UTERUS (H): ICD-10-CM

## 2024-07-29 DIAGNOSIS — R41.82 ALTERED MENTAL STATUS, UNSPECIFIED ALTERED MENTAL STATUS TYPE: Primary | ICD-10-CM

## 2024-07-29 DIAGNOSIS — T81.30XA WOUND DEHISCENCE: ICD-10-CM

## 2024-07-29 DIAGNOSIS — R47.1 DYSARTHRIA: ICD-10-CM

## 2024-07-29 LAB
ALBUMIN SERPL BCG-MCNC: 2.5 G/DL (ref 3.5–5.2)
ALBUMIN UR-MCNC: 50 MG/DL
ALP SERPL-CCNC: 66 U/L (ref 40–150)
ALT SERPL W P-5'-P-CCNC: <5 U/L (ref 0–50)
ANION GAP SERPL CALCULATED.3IONS-SCNC: 7 MMOL/L (ref 7–15)
APPEARANCE UR: ABNORMAL
AST SERPL W P-5'-P-CCNC: 16 U/L (ref 0–45)
BASE EXCESS BLDV CALC-SCNC: 2 MMOL/L (ref -3–3)
BASOPHILS # BLD AUTO: 0.1 10E3/UL (ref 0–0.2)
BASOPHILS NFR BLD AUTO: 1 %
BILIRUB SERPL-MCNC: 0.2 MG/DL
BILIRUB UR QL STRIP: NEGATIVE
BUN SERPL-MCNC: 11.6 MG/DL (ref 8–23)
CALCIUM SERPL-MCNC: 8.4 MG/DL (ref 8.8–10.4)
CHLORIDE SERPL-SCNC: 106 MMOL/L (ref 98–107)
COLOR UR AUTO: YELLOW
CREAT SERPL-MCNC: 1.26 MG/DL (ref 0.51–0.95)
EGFRCR SERPLBLD CKD-EPI 2021: 45 ML/MIN/1.73M2
EOSINOPHIL # BLD AUTO: 0.1 10E3/UL (ref 0–0.7)
EOSINOPHIL NFR BLD AUTO: 1 %
ERYTHROCYTE [DISTWIDTH] IN BLOOD BY AUTOMATED COUNT: 25.6 % (ref 10–15)
FLUAV RNA SPEC QL NAA+PROBE: NEGATIVE
FLUBV RNA RESP QL NAA+PROBE: NEGATIVE
GLUCOSE SERPL-MCNC: 94 MG/DL (ref 70–99)
GLUCOSE UR STRIP-MCNC: NEGATIVE MG/DL
GRANULAR CAST: 1 /LPF
HCO3 BLDV-SCNC: 28 MMOL/L (ref 21–28)
HCO3 SERPL-SCNC: 25 MMOL/L (ref 22–29)
HCT VFR BLD AUTO: 26.2 % (ref 35–47)
HGB BLD-MCNC: 8.1 G/DL (ref 11.7–15.7)
HGB UR QL STRIP: ABNORMAL
HOLD SPECIMEN: NORMAL
HYALINE CASTS: 2 /LPF
IMM GRANULOCYTES # BLD: 0.1 10E3/UL
IMM GRANULOCYTES NFR BLD: 1 %
KETONES UR STRIP-MCNC: NEGATIVE MG/DL
LACTATE SERPL-SCNC: 1 MMOL/L (ref 0.7–2)
LEUKOCYTE ESTERASE UR QL STRIP: ABNORMAL
LIPASE SERPL-CCNC: 48 U/L (ref 13–60)
LYMPHOCYTES # BLD AUTO: 1.6 10E3/UL (ref 0.8–5.3)
LYMPHOCYTES NFR BLD AUTO: 22 %
MAGNESIUM SERPL-MCNC: 1.9 MG/DL (ref 1.7–2.3)
MCH RBC QN AUTO: 24.1 PG (ref 26.5–33)
MCHC RBC AUTO-ENTMCNC: 30.9 G/DL (ref 31.5–36.5)
MCV RBC AUTO: 78 FL (ref 78–100)
MONOCYTES # BLD AUTO: 0.7 10E3/UL (ref 0–1.3)
MONOCYTES NFR BLD AUTO: 9 %
MUCOUS THREADS #/AREA URNS LPF: PRESENT /LPF
NEUTROPHILS # BLD AUTO: 5.1 10E3/UL (ref 1.6–8.3)
NEUTROPHILS NFR BLD AUTO: 66 %
NITRATE UR QL: NEGATIVE
NRBC # BLD AUTO: 0 10E3/UL
NRBC BLD AUTO-RTO: 0 /100
O2/TOTAL GAS SETTING VFR VENT: 38 %
OXYHGB MFR BLDV: 19 % (ref 70–75)
PCO2 BLDV: 53 MM HG (ref 40–50)
PH BLDV: 7.34 [PH] (ref 7.32–7.43)
PH UR STRIP: 7.5 [PH] (ref 5–7)
PLATELET # BLD AUTO: 352 10E3/UL (ref 150–450)
PO2 BLDV: 19 MM HG (ref 25–47)
POTASSIUM SERPL-SCNC: 4.6 MMOL/L (ref 3.4–5.3)
PROT SERPL-MCNC: 5.8 G/DL (ref 6.4–8.3)
RBC # BLD AUTO: 3.36 10E6/UL (ref 3.8–5.2)
RBC URINE: 12 /HPF
RSV RNA SPEC NAA+PROBE: NEGATIVE
SAO2 % BLDV: 19.4 % (ref 70–75)
SARS-COV-2 RNA RESP QL NAA+PROBE: NEGATIVE
SODIUM SERPL-SCNC: 138 MMOL/L (ref 135–145)
SP GR UR STRIP: 1.01 (ref 1–1.03)
SQUAMOUS EPITHELIAL: 2 /HPF
TRANSITIONAL EPI: <1 /HPF
TROPONIN T SERPL HS-MCNC: 18 NG/L
TROPONIN T SERPL HS-MCNC: 21 NG/L
UROBILINOGEN UR STRIP-MCNC: NORMAL MG/DL
WBC # BLD AUTO: 7.6 10E3/UL (ref 4–11)
WBC CLUMPS #/AREA URNS HPF: PRESENT /HPF
WBC URINE: 78 /HPF

## 2024-07-29 PROCEDURE — 93005 ELECTROCARDIOGRAM TRACING: CPT | Performed by: EMERGENCY MEDICINE

## 2024-07-29 PROCEDURE — 93005 ELECTROCARDIOGRAM TRACING: CPT | Mod: 76 | Performed by: EMERGENCY MEDICINE

## 2024-07-29 PROCEDURE — 96365 THER/PROPH/DIAG IV INF INIT: CPT | Performed by: EMERGENCY MEDICINE

## 2024-07-29 PROCEDURE — 83735 ASSAY OF MAGNESIUM: CPT

## 2024-07-29 PROCEDURE — 87086 URINE CULTURE/COLONY COUNT: CPT

## 2024-07-29 PROCEDURE — 83605 ASSAY OF LACTIC ACID: CPT

## 2024-07-29 PROCEDURE — 70450 CT HEAD/BRAIN W/O DYE: CPT | Mod: 26 | Performed by: RADIOLOGY

## 2024-07-29 PROCEDURE — 250N000011 HC RX IP 250 OP 636

## 2024-07-29 PROCEDURE — 84484 ASSAY OF TROPONIN QUANT: CPT

## 2024-07-29 PROCEDURE — 258N000003 HC RX IP 258 OP 636

## 2024-07-29 PROCEDURE — 999N000127 HC STATISTIC PERIPHERAL IV START W US GUIDANCE

## 2024-07-29 PROCEDURE — 87040 BLOOD CULTURE FOR BACTERIA: CPT

## 2024-07-29 PROCEDURE — 99285 EMERGENCY DEPT VISIT HI MDM: CPT | Mod: 25 | Performed by: EMERGENCY MEDICINE

## 2024-07-29 PROCEDURE — 99285 EMERGENCY DEPT VISIT HI MDM: CPT | Mod: FS | Performed by: EMERGENCY MEDICINE

## 2024-07-29 PROCEDURE — 80053 COMPREHEN METABOLIC PANEL: CPT

## 2024-07-29 PROCEDURE — 85025 COMPLETE CBC W/AUTO DIFF WBC: CPT

## 2024-07-29 PROCEDURE — 81001 URINALYSIS AUTO W/SCOPE: CPT

## 2024-07-29 PROCEDURE — 87637 SARSCOV2&INF A&B&RSV AMP PRB: CPT

## 2024-07-29 PROCEDURE — 36415 COLL VENOUS BLD VENIPUNCTURE: CPT | Performed by: EMERGENCY MEDICINE

## 2024-07-29 PROCEDURE — 82805 BLOOD GASES W/O2 SATURATION: CPT | Performed by: EMERGENCY MEDICINE

## 2024-07-29 PROCEDURE — 70450 CT HEAD/BRAIN W/O DYE: CPT

## 2024-07-29 PROCEDURE — 96361 HYDRATE IV INFUSION ADD-ON: CPT | Performed by: EMERGENCY MEDICINE

## 2024-07-29 PROCEDURE — 36415 COLL VENOUS BLD VENIPUNCTURE: CPT

## 2024-07-29 PROCEDURE — 83690 ASSAY OF LIPASE: CPT

## 2024-07-29 PROCEDURE — 93010 ELECTROCARDIOGRAM REPORT: CPT

## 2024-07-29 PROCEDURE — 258N000003 HC RX IP 258 OP 636: Performed by: EMERGENCY MEDICINE

## 2024-07-29 PROCEDURE — 99255 IP/OBS CONSLTJ NEW/EST HI 80: CPT | Mod: 24 | Performed by: OBSTETRICS & GYNECOLOGY

## 2024-07-29 RX ORDER — PIPERACILLIN SODIUM, TAZOBACTAM SODIUM 2; .25 G/10ML; G/10ML
2.25 INJECTION, POWDER, LYOPHILIZED, FOR SOLUTION INTRAVENOUS ONCE
Status: COMPLETED | OUTPATIENT
Start: 2024-07-29 | End: 2024-07-30

## 2024-07-29 RX ADMIN — SODIUM CHLORIDE 500 ML: 9 INJECTION, SOLUTION INTRAVENOUS at 22:43

## 2024-07-29 RX ADMIN — SODIUM CHLORIDE 1000 ML: 9 INJECTION, SOLUTION INTRAVENOUS at 18:01

## 2024-07-29 RX ADMIN — PIPERACILLIN AND TAZOBACTAM 2.25 G: 2; .25 INJECTION, POWDER, LYOPHILIZED, FOR SOLUTION INTRAVENOUS; PARENTERAL at 23:11

## 2024-07-29 ASSESSMENT — ACTIVITIES OF DAILY LIVING (ADL)
ADLS_ACUITY_SCORE: 38

## 2024-07-29 NOTE — LETTER
Alis Hartman MRN# [id number]   YOB: 1955 Age: 69 year old     Date of Admission:  7/30/24  Date of Discharge:  8/9/2024  Admitting Physician:  Yeimy Muniz MD  Discharging Physician: Yeimy Muniz MD   Discharging Service:  Gynecology / Oncology  Hospitalization Status: Inpatient     Primary Care Clinic:  [facility]  Primary Care Provider: Maria Fernanda Eckert     Dear Dr. Eckert:            You have been identified as the Primary Care Provider for Alis Hartman, who was recently admitted to the [facility].  Thank you for the referral to our hospital.  It is our goal to provide the highest quality of care for our patients, including planning for seamless continuity of care by providing you with timely, accurate and concise information.  After reviewing the following combined discharge summary and final progress note, please contact us if you have any remaining questions.  The Discharging Physician will be the best informed, with their contact information listed above.  If unable to reach them, or if you have received this letter in error, please call [phone] and someone will try to help you.

## 2024-07-29 NOTE — PROGRESS NOTES
[facility]: Transitions of Care Note  Chief Complaint   Patient presents with    Clinic Care Coordination - Post Hospital     Most Recent Admission Date: 7/21/2024   Most Recent Admission Diagnosis: Lower abdominal pain - R10.30  Acute cystitis without hematuria - N30.00     Most Recent Discharge Date: 7/26/2024   Most Recent Discharge Diagnosis: Lower abdominal pain - R10.30  Acute cystitis without hematuria - N30.00  Open wound - T14.8XXA  S/P hysterectomy - Z90.710     Transitions of Care Assessment    Telephone call from writer not indicated at this time, due to the following reason(s): Patient was discharged from the hospital to a TCU ([facility], [place]) - and has an office visit scheduled within 48 business hours of hospital discharge.      Follow up Plan     Patient has post-op appointment scheduled with Dr. Perez for tomorrow (7/30/24).    Future Appointments   Date Time Provider Department Center   7/30/2024  1:30 PM Dany Perez MD [facility]   8/7/2024  8:45 AM [facility]   8/20/2024  9:30 AM Robin Herrera MD [facility]     Nicholas Jarrell, RN, BSN, OCN  RN Care Coordinator - Oncology  [facility] [place]

## 2024-07-29 NOTE — ED TRIAGE NOTES
Patient BIBA due to abdominal pain and confusion. Had recent nephrotomy tube surgery. Has A-fib on Eliquis.

## 2024-07-29 NOTE — ED PROVIDER NOTES
ED Provider Note  [facility]      History     Chief Complaint   Patient presents with    Abdominal Pain     The history is provided by the patient, the EMS personnel, the nursing home and medical records. The history is limited by the condition of the patient. No  was used.     Alis Hartman is a 69 year old female with a history of atrial fibrillation on Eliquis, hypertension, schizophrenia, cervical cancer, and uterine cancer s/p diagnostic laparoscopy converted to exploratory laparotomy with hysterectomy bilateral salpingo-oophorectomy, lysis of adhesions and repair of cystotomy, insertion of suprapubic catheter (7/12/2024) who presents to the emergency department with abdominal pain and altered mental status.  [facility] after her most recent admission.  She was recommended to seek evaluation in the ED by the TCU physician who had concerns with her declining mental status over the past 2 days.  According to chart review of the TCU provider's note, patient's family was having concerns and suspected it was due to her Seroquel medication that was making her act differently.  They state that she is usually very upbeat and chatty and she has not been for at least the past 2 days since discharge from the hospital.  On initial exam, it is difficult to fully assess the patient due to difficulty speaking.  She is able to state her full name and birthday, where she currently is, the current month and the current year.  When asked other questions about how she has been feeling, it appears she loses her train of thought and is unable to answer.  She is also very difficult to understand due to muffled versus slurred speech versus dysarthria.  She states that she continues to have abdominal pain.  When asked if she has any other symptoms, she denies shortness of breath or chest discomfort.  Review of systems otherwise difficult to  obtain fully due to patient's condition on presentation.    According to chart review, she is taking Eliquis for history of atrial fibrillation.    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 Patient recently had XL, SNEHA , BSO, EDIE and cytotomy repair with suprapubic bladder insertion and omental flap on 7/12/2024.  TINY drain was present upon admission.  She was discharged on 7/26/2024 with new prescriptions for Robaxin 500 mg every 6 hours as needed for muscle spasms or abdominal pain/soreness, metronidazole for skin and/or soft tissue infectious s/p hysterectomy and Bactrim for skin and/or soft tissue infection s/p hysterectomy.    Past Medical History  Past Medical History:   Diagnosis Date    Atrial fibrillation (H)     Depression     Hypertension     Malignant neoplasm of endocervix (H)     Tx with radiation    Other chronic pain     Low back    Schizophrenia (H)     Urinary incontinence      Past Surgical History:   Procedure Laterality Date    ABDOMINOPLASTY      BIOPSY CERVICAL, LOCAL EXCISION, SINGLE/MULTIPLE  10/26/2011    Procedure:BIOPSY CERVICAL, LOCAL EXCISION, SINGLE/MULTIPLE; EUA, cervical biopsies; Surgeon:BETTY TINEO; Location:[facility] OR    BIOPSY VAGINAL N/A 6/8/2021    Procedure: BIOPSY, VAGINA;  Surgeon: Dany Perez MD;  Location: [facility] OR    CYSTOSCOPY N/A 5/17/2024    Procedure: Cystoscopy;  Surgeon: Dany Perez MD;  [facility]    CYSTOSTOMY, INSERT TUBE SUPRAPUBIC, COMBINED  7/12/2024    Procedure: Insertion of supra-pubic catheter;  Surgeon: Dany Perez MD;  [facility]    DILATION AND CURETTAGE, WITH ULTRASOUND GUIDANCE N/A 5/17/2024    Procedure: Dilation and curettage of the uterus with drainage of uterine fluid under ultrasound guidance, lysis of vaginal adhesions;  Surgeon: Dany Perez MD;  Location:  [facility] OR    EXAM UNDER ANESTHESIA PELVIC N/A 3/12/2020    Procedure: Exam under anesthsia, vaginal biopsies, possible CO2 laser of the vagina;  Surgeon: Lilliam Roy MD;  Location: [facility]    GI SURGERY  2004    gastric bypass    HYSTERECTOMY TOTAL ABDOMINAL, BILATERAL SALPINGO-OOPHORECTOMY, COMBINED Bilateral 7/12/2024    Procedure: Diagnostic laparoscopy converted to exploratory laparotomy, total abdominal hysterectomy, bilateral salpingo-oophorectomy, lysis of adhesions >60 min;  Surgeon: Dany Perez MD;  [facility]    LASER CO2 VAGINA N/A 3/2/2015    Procedure: LASER CO2 VAGINA;  Surgeon: Mariela Abdalla MD;  Location: [facility] OR    LASER CO2 VAGINA N/A 5/24/2019    Procedure: Exam under anesthesia, vaginal biopsies, CO2 laser of the vagina;  Surgeon: Lilliam Roy MD;  Location: [facility]    LASER CO2 VAGINA N/A 6/8/2021    Procedure: Exam under anesthesia, laser ablation of the upper vagina;  Surgeon: Dany Perez MD;  Location: [facility]    REPAIR BLADDER N/A 7/12/2024    Procedure: Repair bladder;  Surgeon: Dany Perez MD;  [facility]: [facility]     acetaminophen (TYLENOL) 325 MG tablet  albuterol (PROAIR HFA/PROVENTIL HFA/VENTOLIN HFA) 108 (90 Base) MCG/ACT inhaler  apixaban ANTICOAGULANT (ELIQUIS) 2.5 MG tablet  atenolol (TENORMIN) 100 MG tablet  benztropine (COGENTIN) 1 MG tablet  calcium Citrate-vitamin D 500-400 MG-UNIT CHEW  childrens multivitamin w/iron (FLINTSTONES COMPLETE) 60 MG chewable tablet  cholecalciferol 125 MCG (5000 UT) CAPS  cyanocobalamin (CYANOCOBALAMIN) 1000 MCG/ML injection  diclofenac (VOLTAREN) 1 % topical gel  ferrous sulfate (CASSANDRA-IN-SOL) 75 (15 FE) MG/ML oral drops  furosemide (LASIX) 40 MG tablet  gabapentin (NEURONTIN) 800 MG tablet  hydrocortisone 2.5 % cream  lidocaine (XYLOCAINE) 5 % external ointment  methocarbamol (ROBAXIN) 500 MG tablet  metroNIDAZOLE (FLAGYL) 500 MG tablet  naloxone (NARCAN) 4 MG/0.1ML nasal spray  oxyBUTYnin (DITROPAN) 5 MG  tablet  oxyCODONE (ROXICODONE) 5 MG tablet  phenazopyridine (PYRIDIUM) 100 MG tablet  polyethylene glycol (MIRALAX) 17 GM/Dose powder  QUEtiapine (SEROQUEL) 400 MG tablet  senna-docusate (SENOKOT-S/PERICOLACE) 8.6-50 MG tablet  sertraline (ZOLOFT) 100 MG tablet  sulfamethoxazole-trimethoprim (BACTRIM DS) 800-160 MG tablet  tiZANidine (ZANAFLEX) 4 MG tablet  zolpidem (AMBIEN) 10 MG tablet      Allergies   Allergen Reactions    Ibuprofen Nausea and Vomiting    Shrimp     Sulfa Antibiotics Rash     Patient started on bactrim 7/24/24 tolerating medication without rash on 7/25      Family History  Family History   Problem Relation Age of Onset    Heart Disease Father     Heart Failure Father     No Known Problems Brother     No Known Problems Brother     No Known Problems Brother     Pancreatitis Brother     Hypertension Sister     Peripheral Vascular Disease Sister     No Known Problems Sister     No Known Problems Son     No Known Problems Daughter      Social History   Social History     Tobacco Use    Smoking status: Never    Smokeless tobacco: Never   Substance Use Topics    Alcohol use: Not Currently    Drug use: No      Past medical history, past surgical history, medications, allergies, family history, and social history were reviewed with the patient. No additional pertinent items.     A medically appropriate review of systems was performed with pertinent positives and negatives noted in the HPI, and all other systems negative.    Physical Exam   BP: (!) 141/113  Pulse: (!) 49  Temp: 97.6  F (36.4  C)  Resp: 18  SpO2: 100 %  Vitals:    07/29/24 1640 07/29/24 1700 07/29/24 1954 07/29/24 2310   BP:  102/76 98/83 92/68   BP Location:       Pulse: (!) 49 (!) 48 54 53   Resp:  18 12 12   Temp:  97.6  F (36.4  C)  97.2  F (36.2  C)   TempSrc:  Oral  Axillary   SpO2:  96% 100% 100%     Physical Exam  Constitutional:       General: She is awake. She is not in acute distress.     Appearance: She is normal weight. She is  ill-appearing. She is not toxic-appearing or diaphoretic.   HENT:      Head: Normocephalic and atraumatic.      Mouth/Throat:      Mouth: Mucous membranes are moist.      Pharynx: Oropharynx is clear.   Eyes:      Pupils: Pupils are equal, round, and reactive to light.   Cardiovascular:      Rate and Rhythm: Regular rhythm. Bradycardia present.   Pulmonary:      Effort: Pulmonary effort is normal. No respiratory distress.      Breath sounds: Normal breath sounds. No stridor. No wheezing, rhonchi or rales.   Abdominal:      General: Bowel sounds are normal.      Palpations: Abdomen is soft.      Tenderness: There is abdominal tenderness in the epigastric area. There is no guarding or rebound.       Genitourinary:     Comments: Indwelling Shahid catheter present  Neurological:      Mental Status: She is oriented to person, place, and time. She is confused.      Cranial Nerves: Dysarthria present. No facial asymmetry.      Comments: Difficult exam   Psychiatric:         Mood and Affect: Mood normal.         Behavior: Behavior normal. Behavior is cooperative.       ED Course, Procedures, & Data     ED Course as of 07/30/24 0045   Mon Jul 29, 2024   2130 GYN/ONC to see patient in the ED     Procedures            EKG Interpretation:      Interpreted by Padmini Fuentes PA-C  Time reviewed: 1655  Symptoms at time of EKG: AMS   Rhythm: sinus bradycardia  Rate: Bradycardia and 40-50  Axis: Left Axis Deviation  Ectopy: none  Conduction: normal  ST Segments/ T Waves: No acute ischemic changes and Non-specific ST-T wave changes  Q Waves: none  Comparison to prior: Changes noted from recent previous on 7/21/2024    Clinical Impression: sinus bradycardia           Results for orders placed or performed during the hospital encounter of 07/29/24   CT Head w/o Contrast     Status: None    Narrative    EXAM: CT HEAD W/O CONTRAST  LOCATION: [facility]  DATE: 7/29/2024    INDICATION:  AMS/confusion  COMPARISON: None.  TECHNIQUE: Routine CT Head without IV contrast. Multiplanar reformats. Dose reduction techniques were used.    FINDINGS:  INTRACRANIAL CONTENTS: No intracranial hemorrhage, extraaxial collection, or mass effect.  No CT evidence of acute infarct. Normal parenchymal attenuation. Normal ventricles and sulci.     VISUALIZED ORBITS/SINUSES/MASTOIDS: No intraorbital abnormality. No paranasal sinus mucosal disease. No middle ear or mastoid effusion.    BONES/SOFT TISSUES: No acute abnormality. Calvarium demonstrates scattered small lucencies non specific likely incidental hemangiomas in the absence of a known malignancy.      Impression    IMPRESSION:  1.  No acute intracranial process.   [place] Draw     Status: None    Narrative    The following orders were created for panel order [place] Draw.  Procedure                               Abnormality         Status                     ---------                               -----------         ------                     Extra Blue Top Tube[[id number]]                              Final result               Extra Red Top Tube[[id number]]                               Final result               Extra Green Top (Lithium...[[id number]]                      Final result               Extra Purple Top Tube[[id number]]                            Final result               Extra Green Top (Lithium...[[id number]]                      Final result                 Please view results for these tests on the individual orders.   Extra Blue Top Tube     Status: None   Result Value Ref Range    Hold Specimen JIC    Extra Red Top Tube     Status: None   Result Value Ref Range    Hold Specimen JIC    Extra Green Top (Lithium Heparin) Tube     Status: None   Result Value Ref Range    Hold Specimen JIC    Extra Purple Top Tube     Status: None   Result Value Ref Range    Hold Specimen JIC    Extra Green Top (Lithium Heparin) ON ICE     Status: None   Result Value  Ref Range    Hold Specimen [facility]    Blood gas venous     Status: Abnormal   Result Value Ref Range    pH Venous 7.34 7.32 - 7.43    pCO2 Venous 53 (H) 40 - 50 mm Hg    pO2 Venous 19 (L) 25 - 47 mm Hg    Bicarbonate Venous 28 21 - 28 mmol/L    Base Excess/Deficit Venous 2.0 -3.0 - 3.0 mmol/L    FIO2 38     Oxyhemoglobin Venous 19 (L) 70 - 75 %    O2 Sat, Venous 19.4 (L) 70.0 - 75.0 %    Narrative    In healthy individuals, oxyhemoglobin (O2Hb) and oxygen saturation (SO2) are approximately equal. In the presence of dyshemoglobins, oxyhemoglobin can be considerably lower than oxygen saturation.   Comprehensive metabolic panel     Status: Abnormal   Result Value Ref Range    Sodium 138 135 - 145 mmol/L    Potassium 4.6 3.4 - 5.3 mmol/L    Carbon Dioxide (CO2) 25 22 - 29 mmol/L    Anion Gap 7 7 - 15 mmol/L    Urea Nitrogen 11.6 8.0 - 23.0 mg/dL    Creatinine 1.26 (H) 0.51 - 0.95 mg/dL    GFR Estimate 45 (L) >60 mL/min/1.73m2    Calcium 8.4 (L) 8.8 - 10.4 mg/dL    Chloride 106 98 - 107 mmol/L    Glucose 94 70 - 99 mg/dL    Alkaline Phosphatase 66 40 - 150 U/L    AST 16 0 - 45 U/L    ALT <5 0 - 50 U/L    Protein Total 5.8 (L) 6.4 - 8.3 g/dL    Albumin 2.5 (L) 3.5 - 5.2 g/dL    Bilirubin Total 0.2 <=1.2 mg/dL   Lipase     Status: Normal   Result Value Ref Range    Lipase 48 13 - 60 U/L   Magnesium     Status: Normal   Result Value Ref Range    Magnesium 1.9 1.7 - 2.3 mg/dL   Troponin T, High Sensitivity     Status: Abnormal   Result Value Ref Range    Troponin T, High Sensitivity 21 (H) <=14 ng/L   UA with Microscopic reflex to Culture     Status: Abnormal    Specimen: Urine, Shahid Catheter   Result Value Ref Range    Color Urine Yellow Colorless, Straw, Light Yellow, Yellow    Appearance Urine Slightly Cloudy (A) Clear    Glucose Urine Negative Negative mg/dL    Bilirubin Urine Negative Negative    Ketones Urine Negative Negative mg/dL    Specific Gravity Urine 1.011 1.003 - 1.035    Blood Urine Small (A) Negative    pH  Urine 7.5 (H) 5.0 - 7.0    Protein Albumin Urine 50 (A) Negative mg/dL    Urobilinogen Urine Normal Normal, 2.0 mg/dL    Nitrite Urine Negative Negative    Leukocyte Esterase Urine Large (A) Negative    WBC Clumps Urine Present (A) None Seen /HPF    Mucus Urine Present (A) None Seen /LPF    RBC Urine 12 (H) <=2 /HPF    WBC Urine 78 (H) <=5 /HPF    Squamous Epithelials Urine 2 (H) <=1 /HPF    Transitional Epithelials Urine <1 <=1 /HPF    Hyaline Casts Urine 2 <=2 /LPF    Granular Casts Urine 1 (H) None Seen /LPF    Narrative    Urine Culture ordered based on laboratory criteria   Asymptomatic Influenza A/B, RSV, & SARS-CoV2 PCR (COVID-19) Nasopharyngeal     Status: Normal    Specimen: Nasopharyngeal; Swab   Result Value Ref Range    Influenza A PCR Negative Negative    Influenza B PCR Negative Negative    RSV PCR Negative Negative    SARS CoV2 PCR Negative Negative    Narrative    Testing was performed using the Xpert Xpress CoV2/Flu/RSV Assay on the Cepheid GeneXpert Instrument. This test should be ordered for the detection of SARS-CoV2, influenza, and RSV viruses in individuals with signs and symptoms of respiratory tract infection. This test is for in vitro diagnostic use under the [place] FDA for laboratories certified under CLIA to perform high or moderate complexity testing. This test has been [place] FDA cleared. A negative result does not rule out the presence of PCR inhibitors in the specimen or target RNA in concentration below the limit of detection for the assay. If only one viral target is positive but coinfection with multiple targets is suspected, the sample should be re-tested with another FDA cleared, approved, or authorized test, if coninfection would change clinical management. This test was validated by the [facility]. These laboratories are certified under the Clinical Laboratory Improvement Amendments of 1988 (CLIA-88) as qualified to perfom high complexity laboratory testing.   Lactic  acid whole blood with 1x repeat in 2 hr when >2     Status: Normal   Result Value Ref Range    Lactic Acid, Initial 1.0 0.7 - 2.0 mmol/L   CBC with platelets and differential     Status: Abnormal   Result Value Ref Range    WBC Count 7.6 4.0 - 11.0 10e3/uL    RBC Count 3.36 (L) 3.80 - 5.20 10e6/uL    Hemoglobin 8.1 (L) 11.7 - 15.7 g/dL    Hematocrit 26.2 (L) 35.0 - 47.0 %    MCV 78 78 - 100 fL    MCH 24.1 (L) 26.5 - 33.0 pg    MCHC 30.9 (L) 31.5 - 36.5 g/dL    RDW 25.6 (H) 10.0 - 15.0 %    Platelet Count 352 150 - 450 10e3/uL    % Neutrophils 66 %    % Lymphocytes 22 %    % Monocytes 9 %    % Eosinophils 1 %    % Basophils 1 %    % Immature Granulocytes 1 %    NRBCs per 100 WBC 0 <1 /100    Absolute Neutrophils 5.1 1.6 - 8.3 10e3/uL    Absolute Lymphocytes 1.6 0.8 - 5.3 10e3/uL    Absolute Monocytes 0.7 0.0 - 1.3 10e3/uL    Absolute Eosinophils 0.1 0.0 - 0.7 10e3/uL    Absolute Basophils 0.1 0.0 - 0.2 10e3/uL    Absolute Immature Granulocytes 0.1 <=0.4 10e3/uL    Absolute NRBCs 0.0 10e3/uL   Troponin T, High Sensitivity     Status: Abnormal   Result Value Ref Range    Troponin T, High Sensitivity 18 (H) <=14 ng/L   EKG 12-lead, tracing only     Status: None (Preliminary result)   Result Value Ref Range    Systolic Blood Pressure  mmHg    Diastolic Blood Pressure  mmHg    Ventricular Rate 47 BPM    Atrial Rate 47 BPM    MA Interval 192 ms    QRS Duration 72 ms     ms    QTc 417 ms    P Axis 6 degrees    R AXIS -10 degrees    T Axis -2 degrees    Interpretation ECG       Sinus bradycardia  Minimal voltage criteria for LVH, may be normal variant  Anterolateral infarct , age undetermined  Abnormal ECG     CBC with platelets differential     Status: Abnormal    Narrative    The following orders were created for panel order CBC with platelets differential.  Procedure                               Abnormality         Status                     ---------                               -----------         ------                      CBC with platelets and d...[[id number]]  Abnormal            Final result                 Please view results for these tests on the individual orders.     Medications   pharmacy alert - intermittent dosing (has no administration in time range)   sodium chloride 0.9% BOLUS 1,000 mL (0 mLs Intravenous Stopped 7/29/24 1950)   piperacillin-tazobactam (ZOSYN) 2.25 g vial to attach to  ml bag (0 g Intravenous Stopped 7/30/24 0004)   sodium chloride 0.9% BOLUS 500 mL (0 mLs Intravenous Stopped 7/30/24 0004)     Labs Ordered and Resulted from Time of ED Arrival to Time of ED Departure   BLOOD GAS VENOUS - Abnormal       Result Value    pH Venous 7.34      pCO2 Venous 53 (*)     pO2 Venous 19 (*)     Bicarbonate Venous 28      Base Excess/Deficit Venous 2.0      FIO2 38      Oxyhemoglobin Venous 19 (*)     O2 Sat, Venous 19.4 (*)    COMPREHENSIVE METABOLIC PANEL - Abnormal    Sodium 138      Potassium 4.6      Carbon Dioxide (CO2) 25      Anion Gap 7      Urea Nitrogen 11.6      Creatinine 1.26 (*)     GFR Estimate 45 (*)     Calcium 8.4 (*)     Chloride 106      Glucose 94      Alkaline Phosphatase 66      AST 16      ALT <5      Protein Total 5.8 (*)     Albumin 2.5 (*)     Bilirubin Total 0.2     TROPONIN T, HIGH SENSITIVITY - Abnormal    Troponin T, High Sensitivity 21 (*)    ROUTINE UA WITH MICROSCOPIC REFLEX TO CULTURE - Abnormal    Color Urine Yellow      Appearance Urine Slightly Cloudy (*)     Glucose Urine Negative      Bilirubin Urine Negative      Ketones Urine Negative      Specific Gravity Urine 1.011      Blood Urine Small (*)     pH Urine 7.5 (*)     Protein Albumin Urine 50 (*)     Urobilinogen Urine Normal      Nitrite Urine Negative      Leukocyte Esterase Urine Large (*)     WBC Clumps Urine Present (*)     Mucus Urine Present (*)     RBC Urine 12 (*)     WBC Urine 78 (*)     Squamous Epithelials Urine 2 (*)     Transitional Epithelials Urine <1      Hyaline Casts Urine 2      Granular  Casts Urine 1 (*)    CBC WITH PLATELETS AND DIFFERENTIAL - Abnormal    WBC Count 7.6      RBC Count 3.36 (*)     Hemoglobin 8.1 (*)     Hematocrit 26.2 (*)     MCV 78      MCH 24.1 (*)     MCHC 30.9 (*)     RDW 25.6 (*)     Platelet Count 352      % Neutrophils 66      % Lymphocytes 22      % Monocytes 9      % Eosinophils 1      % Basophils 1      % Immature Granulocytes 1      NRBCs per 100 WBC 0      Absolute Neutrophils 5.1      Absolute Lymphocytes 1.6      Absolute Monocytes 0.7      Absolute Eosinophils 0.1      Absolute Basophils 0.1      Absolute Immature Granulocytes 0.1      Absolute NRBCs 0.0     TROPONIN T, HIGH SENSITIVITY - Abnormal    Troponin T, High Sensitivity 18 (*)    LIPASE - Normal    Lipase 48     MAGNESIUM - Normal    Magnesium 1.9     INFLUENZA A/B, RSV, & SARS-COV2 PCR - Normal    Influenza A PCR Negative      Influenza B PCR Negative      RSV PCR Negative      SARS CoV2 PCR Negative     LACTIC ACID WHOLE BLOOD WITH 1X REPEAT IN 2 HR WHEN >2 - Normal    Lactic Acid, Initial 1.0     BLOOD CULTURE   URINE CULTURE     CT Head w/o Contrast   Final Result   IMPRESSION:   1.  No acute intracranial process.             Critical care was not performed.     Medical Decision Making  The patient's presentation was of high complexity (an acute health issue posing potential threat to life or bodily function).    The patient's evaluation involved:  review of external note(s) from 1 sources (ED encounter 7/21/2024, discharge summary 7/26/2024)  review of 2 test result(s) ordered prior to this encounter (CT abdomen and pelvis 7/21/2024, labs 7/21/2024)  ordering and/or review of 3+ test(s) in this encounter (see separate area of note for details)  discussion of management or test interpretation with another health professional (GYN/ONC)    The patient's management necessitated high risk (a decision regarding hospitalization) and further care after sign-out to Dr. Corrales  (see their note for further  management).    Assessment & Plan    Aranza is a 69-year-old female that presented to the ED from TCU with concerns of altered mental status as well as abdominal pain.  Vital signs initially concerning for hypoxia but on evaluation, patient not showing any signs of acute respiratory distress or difficulties breathing and was noted to be having difficulties with pulse oximetry probe due to patient's gel manicure.  Most consistent reading with probe on the earlobe.  Patient back and forth between needing no supplemental oxygen to 2 L via nasal cannula to 10 L via oxygen mask but again, poor waveform for O2 monitoring was noted throughout the entire ED visit.  Patient otherwise afebrile without hypotension and somewhat bradycardic heart rate between 47 and the mid to upper 50s.  Initially on exam, patient is oriented to person, place, and time but in some ways difficult to understand due to some mild dysarthric speech.  Patient reported abdominal pain mostly elicited in the epigastric area without guarding, rebound, or rigidity.  No adventitious lung sounds on auscultation.  Difficult neuroexam otherwise no gross neurodeficit or facial asymmetry but otherwise just generalized weakness noted in the patient.    Broad workup initiated including CT head imaging, labs with blood culture x 1, and UA.  Initial troponin 23 with 2-hour repeat at 18 in the setting of no acute chest pain and otherwise reassuring EKG sinus bradycardia.  Lactic acid 1.0.  UA notable for slightly cloudy appearance with small amount of blood, large amount of leukocyte esterase with WBC in clumps.  This UA sample was obtained from the Shahid catheter.  COVID, influenza, RSV negative.  VBG reassuring.  Notable continued elevation of serum creatinine over the past 5 days with baseline around 0.7 and creatinine today at 1.26.  Hemoglobin stable at 8.1.  CT head without contrast negative for acute intracranial abnormality.  Due to patient's UA and unknown  if patient is chronically colonized due to her nephrostomy, suprapubic catheter, and indwelling Shahid catheter, will treat for UTI at this time.  Most recent previous UA culture from 7/21/2024 notable for Klebsiella pneumoniae and Klebsiella pneumoniae ESBL.  Discussed culture results with pharmacy who was agreeable to the decision of Jolene at this time.  Recommend patient be admitted for altered mental status and UTI.  Discussed patient case with GYN/ONC who had admitted the patient her most recent 2 admissions this month of July.  They were agreeable to evaluate the patient in the ED and give recommendations whether they would admit to their service or deferred to general medicine.  Awaiting GYN/ONC evaluation and recommendations at the end of this ED THAO shift.  Patient handoff was given to staff physician pending recommendations from GYN/ONC.    --    ED Attending Physician Attestation    I EMMANUEL AYALA MD, cared for this patient with the Advanced Practice Provider (THAO). I personally provided a substantive portion of the care for this patient, including approving the care plan for the number and complexity of problems addressed and taking responsibility related to the risk of complications and/or morbidity or mortality of patient management. Please see the THAO's documentation for full details.    Summary of HPI, PE, ED Course   Patient is a 69 year old female evaluated in the emergency department for abdominal pain, AMS and dysarthria. Exam and ED course notable for likely toxicity from CNS depressant medications and possible infection. After the completion of care in the emergency department, the patient was admitted to inpatient.      EMMANUEL AYALA MD  Emergency Medicine       I have reviewed the nursing notes. I have reviewed the findings, diagnosis, plan and need for follow up with the patient.    New Prescriptions    No medications on file       Final diagnoses:   MADHU (acute kidney injury) (H24)    AMS (altered mental status)   Dysarthria       Padmini Fuentes PA-C    [facility] EMERGENCY DEPARTMENT  7/29/2024     Padmini Fuentes PA-C  07/29/24 2312       Gustavo Corrales MD  07/30/24 0046

## 2024-07-30 PROBLEM — N17.9 AKI (ACUTE KIDNEY INJURY) (H): Status: ACTIVE | Noted: 2024-07-30

## 2024-07-30 PROBLEM — R47.1 DYSARTHRIA: Status: ACTIVE | Noted: 2024-07-30

## 2024-07-30 PROBLEM — R41.82 AMS (ALTERED MENTAL STATUS): Status: ACTIVE | Noted: 2024-07-30

## 2024-07-30 LAB
ABO/RH(D): NORMAL
ALBUMIN SERPL BCG-MCNC: 2.1 G/DL (ref 3.5–5.2)
ALP SERPL-CCNC: 54 U/L (ref 40–150)
ALT SERPL W P-5'-P-CCNC: <5 U/L (ref 0–50)
ANION GAP SERPL CALCULATED.3IONS-SCNC: 5 MMOL/L (ref 7–15)
ANTIBODY SCREEN: NEGATIVE
AST SERPL W P-5'-P-CCNC: 11 U/L (ref 0–45)
ATRIAL RATE - MUSE: 47 BPM
ATRIAL RATE - MUSE: 56 BPM
BACTERIA UR CULT: NO GROWTH
BILIRUB SERPL-MCNC: <0.2 MG/DL
BUN SERPL-MCNC: 9.6 MG/DL (ref 8–23)
CALCIUM SERPL-MCNC: 7.9 MG/DL (ref 8.8–10.4)
CHLORIDE SERPL-SCNC: 113 MMOL/L (ref 98–107)
CREAT SERPL-MCNC: 1.28 MG/DL (ref 0.51–0.95)
DIASTOLIC BLOOD PRESSURE - MUSE: NORMAL MMHG
DIASTOLIC BLOOD PRESSURE - MUSE: NORMAL MMHG
EGFRCR SERPLBLD CKD-EPI 2021: 45 ML/MIN/1.73M2
ERYTHROCYTE [DISTWIDTH] IN BLOOD BY AUTOMATED COUNT: 25.4 % (ref 10–15)
ERYTHROCYTE [DISTWIDTH] IN BLOOD BY AUTOMATED COUNT: 25.6 % (ref 10–15)
GLUCOSE SERPL-MCNC: 78 MG/DL (ref 70–99)
HCO3 SERPL-SCNC: 24 MMOL/L (ref 22–29)
HCT VFR BLD AUTO: 22.4 % (ref 35–47)
HCT VFR BLD AUTO: 23.7 % (ref 35–47)
HGB BLD-MCNC: 6.7 G/DL (ref 11.7–15.7)
HGB BLD-MCNC: 7.2 G/DL (ref 11.7–15.7)
INTERPRETATION ECG - MUSE: NORMAL
INTERPRETATION ECG - MUSE: NORMAL
MAGNESIUM SERPL-MCNC: 1.8 MG/DL (ref 1.7–2.3)
MCH RBC QN AUTO: 23.7 PG (ref 26.5–33)
MCH RBC QN AUTO: 24 PG (ref 26.5–33)
MCHC RBC AUTO-ENTMCNC: 29.9 G/DL (ref 31.5–36.5)
MCHC RBC AUTO-ENTMCNC: 30.4 G/DL (ref 31.5–36.5)
MCV RBC AUTO: 79 FL (ref 78–100)
MCV RBC AUTO: 79 FL (ref 78–100)
P AXIS - MUSE: 20 DEGREES
P AXIS - MUSE: 6 DEGREES
PHOSPHATE SERPL-MCNC: 3.1 MG/DL (ref 2.5–4.5)
PLATELET # BLD AUTO: 265 10E3/UL (ref 150–450)
PLATELET # BLD AUTO: 271 10E3/UL (ref 150–450)
POTASSIUM SERPL-SCNC: 4.3 MMOL/L (ref 3.4–5.3)
PR INTERVAL - MUSE: 120 MS
PR INTERVAL - MUSE: 192 MS
PROT SERPL-MCNC: 4.9 G/DL (ref 6.4–8.3)
QRS DURATION - MUSE: 72 MS
QRS DURATION - MUSE: 76 MS
QT - MUSE: 452 MS
QT - MUSE: 472 MS
QTC - MUSE: 417 MS
QTC - MUSE: 436 MS
R AXIS - MUSE: -10 DEGREES
R AXIS - MUSE: -16 DEGREES
RBC # BLD AUTO: 2.83 10E6/UL (ref 3.8–5.2)
RBC # BLD AUTO: 3 10E6/UL (ref 3.8–5.2)
SODIUM SERPL-SCNC: 142 MMOL/L (ref 135–145)
SPECIMEN EXPIRATION DATE: NORMAL
SYSTOLIC BLOOD PRESSURE - MUSE: NORMAL MMHG
SYSTOLIC BLOOD PRESSURE - MUSE: NORMAL MMHG
T AXIS - MUSE: -2 DEGREES
T AXIS - MUSE: -8 DEGREES
VENTRICULAR RATE- MUSE: 47 BPM
VENTRICULAR RATE- MUSE: 56 BPM
WBC # BLD AUTO: 5.7 10E3/UL (ref 4–11)
WBC # BLD AUTO: 6 10E3/UL (ref 4–11)

## 2024-07-30 PROCEDURE — 86900 BLOOD TYPING SEROLOGIC ABO: CPT | Performed by: NURSE PRACTITIONER

## 2024-07-30 PROCEDURE — 120N000002 HC R&B MED SURG/OB UMMC

## 2024-07-30 PROCEDURE — 99233 SBSQ HOSP IP/OBS HIGH 50: CPT | Mod: 24 | Performed by: OBSTETRICS & GYNECOLOGY

## 2024-07-30 PROCEDURE — 86923 COMPATIBILITY TEST ELECTRIC: CPT

## 2024-07-30 PROCEDURE — 85014 HEMATOCRIT: CPT | Performed by: NURSE PRACTITIONER

## 2024-07-30 PROCEDURE — 85027 COMPLETE CBC AUTOMATED: CPT

## 2024-07-30 PROCEDURE — 36415 COLL VENOUS BLD VENIPUNCTURE: CPT

## 2024-07-30 PROCEDURE — 250N000013 HC RX MED GY IP 250 OP 250 PS 637

## 2024-07-30 PROCEDURE — 83735 ASSAY OF MAGNESIUM: CPT

## 2024-07-30 PROCEDURE — G0463 HOSPITAL OUTPT CLINIC VISIT: HCPCS

## 2024-07-30 PROCEDURE — 258N000003 HC RX IP 258 OP 636

## 2024-07-30 PROCEDURE — 99253 IP/OBS CNSLTJ NEW/EST LOW 45: CPT | Mod: 24 | Performed by: PSYCHIATRY & NEUROLOGY

## 2024-07-30 PROCEDURE — 84100 ASSAY OF PHOSPHORUS: CPT

## 2024-07-30 PROCEDURE — 99254 IP/OBS CNSLTJ NEW/EST MOD 60: CPT | Mod: GC | Performed by: PSYCHIATRY & NEUROLOGY

## 2024-07-30 PROCEDURE — 36415 COLL VENOUS BLD VENIPUNCTURE: CPT | Performed by: NURSE PRACTITIONER

## 2024-07-30 PROCEDURE — 80053 COMPREHEN METABOLIC PANEL: CPT

## 2024-07-30 RX ORDER — FUROSEMIDE 40 MG
40 TABLET ORAL DAILY
Status: DISCONTINUED | OUTPATIENT
Start: 2024-07-30 | End: 2024-08-01

## 2024-07-30 RX ORDER — QUETIAPINE FUMARATE 400 MG/1
400 TABLET, FILM COATED ORAL AT BEDTIME
Status: DISCONTINUED | OUTPATIENT
Start: 2024-07-30 | End: 2024-07-30

## 2024-07-30 RX ORDER — OXYCODONE HYDROCHLORIDE 5 MG/1
5 TABLET ORAL EVERY 4 HOURS PRN
Status: CANCELLED | OUTPATIENT
Start: 2024-07-30

## 2024-07-30 RX ORDER — PHENAZOPYRIDINE HYDROCHLORIDE 100 MG/1
100 TABLET, FILM COATED ORAL 3 TIMES DAILY PRN
Status: DISCONTINUED | OUTPATIENT
Start: 2024-07-30 | End: 2024-08-09 | Stop reason: HOSPADM

## 2024-07-30 RX ORDER — POLYETHYLENE GLYCOL 3350 17 G/17G
17 POWDER, FOR SOLUTION ORAL DAILY
Status: DISCONTINUED | OUTPATIENT
Start: 2024-07-30 | End: 2024-08-09 | Stop reason: HOSPADM

## 2024-07-30 RX ORDER — GABAPENTIN 800 MG/1
1600 TABLET ORAL 2 TIMES DAILY
Status: DISCONTINUED | OUTPATIENT
Start: 2024-07-30 | End: 2024-07-30

## 2024-07-30 RX ORDER — QUETIAPINE FUMARATE 25 MG/1
25 TABLET, FILM COATED ORAL ONCE
Status: DISCONTINUED | OUTPATIENT
Start: 2024-07-30 | End: 2024-07-30

## 2024-07-30 RX ORDER — QUETIAPINE FUMARATE 100 MG/1
100 TABLET, FILM COATED ORAL ONCE
Status: DISCONTINUED | OUTPATIENT
Start: 2024-07-30 | End: 2024-07-30

## 2024-07-30 RX ORDER — ACETAMINOPHEN 325 MG/1
650 TABLET ORAL EVERY 6 HOURS
Status: DISCONTINUED | OUTPATIENT
Start: 2024-07-30 | End: 2024-08-04

## 2024-07-30 RX ORDER — GABAPENTIN 600 MG/1
300 TABLET ORAL 3 TIMES DAILY
Status: DISCONTINUED | OUTPATIENT
Start: 2024-07-31 | End: 2024-07-31

## 2024-07-30 RX ORDER — METRONIDAZOLE 500 MG/1
500 TABLET ORAL 3 TIMES DAILY
Status: COMPLETED | OUTPATIENT
Start: 2024-07-30 | End: 2024-07-31

## 2024-07-30 RX ORDER — SODIUM CHLORIDE 9 MG/ML
INJECTION, SOLUTION INTRAVENOUS CONTINUOUS
Status: DISCONTINUED | OUTPATIENT
Start: 2024-07-30 | End: 2024-07-31

## 2024-07-30 RX ORDER — GABAPENTIN 600 MG/1
300 TABLET ORAL 3 TIMES DAILY
Status: DISCONTINUED | OUTPATIENT
Start: 2024-07-30 | End: 2024-07-30

## 2024-07-30 RX ORDER — LIDOCAINE 40 MG/G
CREAM TOPICAL
Status: DISCONTINUED | OUTPATIENT
Start: 2024-07-30 | End: 2024-08-09 | Stop reason: HOSPADM

## 2024-07-30 RX ORDER — ZOLPIDEM TARTRATE 5 MG/1
10 TABLET ORAL
Status: DISCONTINUED | OUTPATIENT
Start: 2024-07-30 | End: 2024-08-09 | Stop reason: HOSPADM

## 2024-07-30 RX ORDER — ATENOLOL 25 MG/1
100 TABLET ORAL DAILY
Status: DISCONTINUED | OUTPATIENT
Start: 2024-07-30 | End: 2024-07-31

## 2024-07-30 RX ORDER — SULFAMETHOXAZOLE/TRIMETHOPRIM 800-160 MG
1 TABLET ORAL 2 TIMES DAILY
Status: COMPLETED | OUTPATIENT
Start: 2024-07-30 | End: 2024-07-31

## 2024-07-30 RX ORDER — BENZTROPINE MESYLATE 1 MG/1
1 TABLET ORAL DAILY
Status: DISCONTINUED | OUTPATIENT
Start: 2024-07-30 | End: 2024-08-01

## 2024-07-30 RX ORDER — QUETIAPINE FUMARATE 200 MG/1
200 TABLET, FILM COATED ORAL AT BEDTIME
Status: DISCONTINUED | OUTPATIENT
Start: 2024-07-31 | End: 2024-08-02

## 2024-07-30 RX ORDER — QUETIAPINE FUMARATE 25 MG/1
100 TABLET, FILM COATED ORAL ONCE
Status: COMPLETED | OUTPATIENT
Start: 2024-07-30 | End: 2024-07-30

## 2024-07-30 RX ORDER — LIDOCAINE 4 G/G
1-2 PATCH TOPICAL
Status: DISCONTINUED | OUTPATIENT
Start: 2024-07-30 | End: 2024-08-09 | Stop reason: HOSPADM

## 2024-07-30 RX ADMIN — SERTRALINE HYDROCHLORIDE 150 MG: 50 TABLET ORAL at 09:37

## 2024-07-30 RX ADMIN — ATENOLOL 100 MG: 25 TABLET ORAL at 09:37

## 2024-07-30 RX ADMIN — QUETIAPINE FUMARATE 100 MG: 25 TABLET ORAL at 22:40

## 2024-07-30 RX ADMIN — ACETAMINOPHEN 650 MG: 325 TABLET, FILM COATED ORAL at 15:44

## 2024-07-30 RX ADMIN — Medication 1 MG: at 22:40

## 2024-07-30 RX ADMIN — SULFAMETHOXAZOLE AND TRIMETHOPRIM 1 TABLET: 800; 160 TABLET ORAL at 09:38

## 2024-07-30 RX ADMIN — APIXABAN 2.5 MG: 2.5 TABLET, FILM COATED ORAL at 09:38

## 2024-07-30 RX ADMIN — ACETAMINOPHEN 650 MG: 325 TABLET, FILM COATED ORAL at 09:37

## 2024-07-30 RX ADMIN — METRONIDAZOLE 500 MG: 500 TABLET ORAL at 09:38

## 2024-07-30 RX ADMIN — SODIUM CHLORIDE: 9 INJECTION, SOLUTION INTRAVENOUS at 09:39

## 2024-07-30 RX ADMIN — SODIUM CHLORIDE: 9 INJECTION, SOLUTION INTRAVENOUS at 03:25

## 2024-07-30 RX ADMIN — ACETAMINOPHEN 650 MG: 325 TABLET, FILM COATED ORAL at 20:28

## 2024-07-30 RX ADMIN — SULFAMETHOXAZOLE AND TRIMETHOPRIM 1 TABLET: 800; 160 TABLET ORAL at 20:28

## 2024-07-30 RX ADMIN — METRONIDAZOLE 500 MG: 500 TABLET ORAL at 20:28

## 2024-07-30 RX ADMIN — APIXABAN 2.5 MG: 2.5 TABLET, FILM COATED ORAL at 20:28

## 2024-07-30 RX ADMIN — SODIUM CHLORIDE 500 ML: 9 INJECTION, SOLUTION INTRAVENOUS at 04:39

## 2024-07-30 RX ADMIN — METRONIDAZOLE 500 MG: 500 TABLET ORAL at 15:45

## 2024-07-30 ASSESSMENT — ACTIVITIES OF DAILY LIVING (ADL)
ADLS_ACUITY_SCORE: 38
ADLS_ACUITY_SCORE: 45
ADLS_ACUITY_SCORE: 38
ADLS_ACUITY_SCORE: 38
DEPENDENT_IADLS:: CLEANING;COOKING;LAUNDRY;TRANSPORTATION;MEAL PREPARATION
ADLS_ACUITY_SCORE: 38
ADLS_ACUITY_SCORE: 45
ADLS_ACUITY_SCORE: 38
ADLS_ACUITY_SCORE: 45
ADLS_ACUITY_SCORE: 38
ADLS_ACUITY_SCORE: 38
ADLS_ACUITY_SCORE: 45
ADLS_ACUITY_SCORE: 38

## 2024-07-30 NOTE — CONSULTS
[facility] Nurse Inpatient Assessment     Consulted for: perianal skin breakdown, pannus skin fold incision    Summary: Patient recently discharged 7/26/2024. Perianal skin breakdown related to loose stools and IAD/MASD. Pannus skin fold with incisional dehiscence    Patient History (according to Emergency Medicine provider note(s) 7/29/2024:      Alis Hartman is a 69 year old female with a history of atrial fibrillation on Eliquis, hypertension, schizophrenia, cervical cancer, and uterine cancer s/p diagnostic laparoscopy converted to exploratory laparotomy with hysterectomy bilateral salpingo-oophorectomy, lysis of adhesions and repair of cystotomy, insertion of suprapubic catheter (7/12/2024) who presents to the emergency department with abdominal pain and altered mental status.  She presents from TCU where she was discharged to after her most recent admission.  She was recommended to seek evaluation in the ED by the TCU physician who had concerns with her declining mental status over the past 2 days.  According to chart review of the TCU provider's note, patient's family was having concerns and suspected it was due to her Seroquel medication that was making her act differently.  They state that she is usually very upbeat and chatty and she has not been for at least the past 2 days since discharge from the hospital.  On initial exam, it is difficult to fully assess the patient due to difficulty speaking.  She is able to state her full name and birthday, where she currently is, the current month and the current year.  When asked other questions about how she has been feeling, it appears she loses her train of thought and is unable to answer.  She is also very difficult to understand due to muffled versus slurred speech versus dysarthria.  She states that she continues to have abdominal pain.  When asked if she has any other symptoms, she denies shortness  of breath or chest discomfort.  Review of systems otherwise difficult to obtain fully due to patient's condition on presentation.     According to chart review, she is taking Eliquis for history of atrial fibrillation.     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 Patient recently had XL, SNEHA , BSO, EDIE and cytotomy repair with suprapubic bladder insertion and omental flap on 7/12/2024.  TINY drain was present upon admission.  She was discharged on 7/26/2024 with new prescriptions for Robaxin 500 mg every 6 hours as needed for muscle spasms or abdominal pain/soreness, metronidazole for skin and/or soft tissue infectious s/p hysterectomy and Bactrim for skin and/or soft tissue infection s/p hysterectomy.       Assessment:      Areas visualized during today's visit: Focused: and perianal skin     Skin Injury Location: perianal      7/23/2024 coccyx     7/29 coccyx  Last photo: 7/30/2024  Skin injury due to: Incontinence associated dermatitis (IAD) and Moisture associated skin damage (MASD)  Skin history and plan of care:   loose stools, moisture damage related to incontinence and moisture trapping and skin to skin contact, multiple scattered shallow areas with dermis exposed. Pigmentation is darker to the surrounding tissue, pressure component, temp is normal to touch, non tender on palpation. Patient found wearing adult brief from home, discussed indication for brief use and effects of increased temperature and moisture trapping on skin, she reported she would rather be in a hospital gown and no adult brief. Recommend to add RADU pump to isoflex support surface for additional moisture management    Affected area:      Skin assessment: Denudement and Erosion of epidermis     Measurements (length x width x depth, in cm) larger open areas: 2  x 1  x  0.2 cm       Color:  hyperpigmentation, darker than surrounding skin     Temperature  normal      Drainage: scant .      Color: serosanguinous      Odor: none  Pain: absent and denies , none  Pain interventions prior to dressing change: patient tolerated well, no significant pain present , and slow and gentle cares   Treatment goal: Heal , Drainage control, Infection control/prevention, Maintain (prevention of deterioration), Protection, Promote epidermal migration, and Simplify plan of care  STATUS: initial assessment  Supplies ordered: gathered and discussed with patient     Wound location: pannus skin fold      Last photo: 7/30/2024  Wound due to: Surgical Wound  Wound history/plan of care: dehiscence of transverse abdominal incision under panus skin fold. Mild odor eminating from skin fold, discussed hygiene with patient. Sitter at bedside reports she will bathe after WOC visit. Recommend to add RADU pump to isoflex support surface for additional moisture management as well as Interdry moisture wicking textile  Wound base: 90 % non-granular tissue and adipose tissue, 10 % dermis and granulation tissue     Palpation of the wound bed: normal      Drainage: scant     Description of drainage: serosanguinous     Measurements (length x width x depth, in cm): 16  x 1  x  0.2 cm      Tunneling: N/A     Undermining: N/A  Periwound skin: Intact and Scar tissue      Color: normal and consistent with surrounding tissue      Temperature: normal   Odor: mild  Pain: denies , none  Pain interventions prior to dressing change: patient tolerated well, no significant pain present , and slow and gentle cares   Treatment goal: Heal , Drainage control, Infection control/prevention, Maintain (prevention of deterioration), Protection, Promote epidermal migration, and Promote maturation of scar tissue  STATUS: initial assessment  Supplies ordered: gathered, ordered Polymem roll (#[id number]), and discussed with patient        Treatment Plan:      Buttocks BID and as needed.   Cleanse the area with Marry cleanse and protect, very gently with soft cloth.  Apply ostomy powder (#367147) on all open and denuded skin.  Apply thin layer of Barrier paste (ex: Critic aid) on top of it.  With repeat application, do not scrub the paste, only remove soiled paste and reapply.  If complete removal of paste is necessary use baby oil/mineral oil (#394718) and soft wash cloth.  Ensure pt has chair cushion (#032881) while sitting up in the chair.  Use only one blue pad in between mattress and pt. No brief in bed.     Pannus skin fold incision: Every three days  Cleanse area with normal saline, pat dry  Apply no sting barrier wipe and allow to dry.  Cover incision with a strip of polymem that fits 1/4-1/2 inch larger than the incision width.  Place polymem strip with printing facing outward (read directions on dressing). Solid pink foma should make contact with the wound bed.   Secure with medipore tape only.  If needing additional moisture management, apply Interdry within skin folds and follow additional guidelines below for use.    Interdry(order#[id number]):   1.  Wash skin gently. Pat, do not rub.  2.  With clean scissors, cut enough fabric to cover the affected area, allowing for a minimum of 2 inches to extend beyond the skin fold for moisture evaporation.  3.  Lay a single layer of fabric in the skin fold, placing one edge into the base of the fold. Gently smooth the rest of the fabric over the skin, keeping it flat.  4.  Leave at least 2 inches of fabric exposed outside the skin fold.  5.  Secure the fabric in one of several ways: with the skin fold, with a small amount of skin-friendly tape, or tucked under clothing.  6.  Remove the fabric before bathing and reuse it when finished. When removing, gently separate the skin fold and lift away.  Helpful Hints  1. InterDry  can be written on with a ballpoint pen. It may be helpful to label each piece of InterDry  with the  "date you started using it.  2.  Each piece of InterDry  may be used up to 5 days, depending on fabric soiling, odor, amount of moisture and general skin condition. Replace InterDry  if it becomes soiled with blood, urine or stool.  3.  Do not use creams, ointments, or powders with InterDry  as it may interfere with product efficacy.     Pressure Injury Prevention (PIP) Plan:  If patient is declining pressure injury prevention interventions: Explore reason why and address patient's concerns, Educate on pressure injury risk and prevention intervention(s), If patient is still declining, document \"informed refusal\" , and Ensure Care team is aware ( provider, charge nurse, etc)  Mattress: Follow bed algorithm, reassess daily and order specialty mattress, if indicated.  HOB: Maintain at or below 30 degrees, unless contraindicated  Repositioning in bed: Every 1-2 hours , Left/right positioning; avoid supine, Raise foot of bed prior to raising head of bed, to reduce patient sliding down (shear), and Frequent microturns using positioning wedges, as patient tolerates  Heels: Keep elevated off mattress and Pillows under calves  Protective Dressing: Sacral Mepilex for prevention (#969609),  especially for the agitated patient   Positioning Equipment:None  Chair positioning: Chair cushion (#843945) , Assist patient to reposition hourly, and Do NOT use a donut for sitting (this increases pressure to smaller area and creates a higher potential for injury)    If patient has a buttock pressure injury, or high risk for PI use chair cushion or SPS.  Moisture Management: Perineal cleansing /protection: Follow Incontinence Protocol, Avoid brief in bed, Clean and dry skin folds with bathing , Consider InterDry (#046761) between folds, and Moisturize dry skin  Under Devices: Inspect skin under all medical devices during skin inspection , Ensure tubes are stabilized without tension, and Ensure patient is not lying on medical devices or " equipment when repositioned  Ask provider to discontinue device when no longer needed.         Orders: Written    RECOMMEND PRIMARY TEAM ORDER: None, at this time  Education provided: importance of repositioning, plan of care, wound progress, Infection prevention , Moisture management, Hygiene, and Off-loading pressure  Discussed plan of care with: Patient  WOC nurse follow-up plan: weekly  Notify WOC if wound(s) deteriorate.  Nursing to notify the Provider(s) and re-consult the WOC Nurse if new skin concern.    DATA:     Current support surface: Standard  Stretcher  Containment of urine/stool: Incontinence Protocol, Incontinent pad in bed, and Suprapubic catheter  BMI: There is no height or weight on file to calculate BMI.   Active diet order: Orders Placed This Encounter      Combination Diet Regular Diet Adult     Output: I/O last 3 completed shifts:  In: -   Out: 1000 [Urine:1000]     Labs:   Recent Labs   Lab 07/30/24  0817 07/30/24  0551   ALBUMIN  --  2.1*   HGB 7.2* 6.7*   WBC 6.0 5.7     Pressure injury risk assessment:                          Yoli Montes RN, BSN, CWOCN   Pager no longer is use, please contact through [employer] group: [facility] Nurse [place]  Dept. Office Number: [phone]

## 2024-07-30 NOTE — PROGRESS NOTES
Pt arrived on unit around 5:30PM    Pt now Aox4. Reg diet. Uses commode and per sitter wiped off some streaks of blood but no BM. Pt had WOC consult earlier on sacral wounds. Resting comfortably in bed and ordered supper.

## 2024-07-30 NOTE — CONSULTS
"      Initial Psychiatric Consult   Consult date: July 30, 2024         Reason for Consult, requesting source:    \"neurological symtpoms - confusion, dysarthria/dyskinesia. Hx schizophrenia. medication toxicity in the setting of multiple CNS acting medications. assist with medication management \"  Requesting source: Yeimy Muniz    Labs and imaging reviewed. Discussed with nursing and attendant     Total time spent in chart review, patient interview and coordination of care; 65 min         HPI:   Per Neurology's Note from today:  \"Aranza is a 69 year old female with past medical history significant for paranoid schizophrenia, depression/anxiety, and serous uterine carcinoma who recently underwent exploratory laparotomy, total abdominal hysterectomy, bilateral salpingo-oophorectomy, and lysis of adhesions on 7/12/24.  Course was complicated by small bowel obstruction and UTI, now treated and resolved.     Patient was discharged to TCU after hospital stay for her surgery.  Patient has been started on several CNS acting medications since her discharge on 7/17/2024 including tizanidine, Robaxin, scheduled Seroquel, oxybutynin, benztropine and Ambien.  Per daughter, Joy, patient was only taking Seroquel as needed (and per daughter was not taking it at all recently) prior to her surgery on 7/12/2024.  Since discharge to the TCU however she has been receiving 400 mg nightly scheduled.  Daughter states that has been making the patient sick (nausea and vomiting) and sleepy, however she still been getting the medication in her applesauce.  Was seen by an outpatient family physician for this gradually progressive somnolence/altered mental status.  There was concern brought up for potential aphasia, so patient was sent to the emergency department for further evaluation.     Workup in the emergency department included CT head, which was within normal limits, as well as some basic labs which were negative for an elevated " "white count.  She did however have an elevated creatinine at 1.26 (her baseline appears to be approximately 0.7 - 0.8) with a GFR 46.  Otherwise no other infectious or metabolic abnormalities were identified.  Her UA did show some abnormalities, however her nitrate was negative (was positive on her last check for which she was treated with antibiotics).     During my visit with the patient, her eyes are penitentiary open, she is intermittently participatory in conversation and exam.  She mumbles most of her words, but is however able to speak more clearly after noxious stimulation is applied.  She is able to tell me her name, age and where she is (she is at hospital).  She is able to follow all simple commands with persistent prompting.\"    At bedside, patient is a bit sleepy, collaborative, Aranza states to us that she is not having SI, suicide planning, no paranoia / AH / VH, depressed or having mood swings. She is able to state the day and place correctly, unsure of why she came to the ICU. She has her niece at bedside that is also unclear of why she was taken to the ED.    Current Medication  Holding PTA    PTA Medication  Benztropine 1 mg daily  Gabapentin 800 mg bid  Oxycodone   Quetiapine  Setraline  Tiazinidine  Zolpidem          Past Psychiatric History:   Schizophrenia  RAFAEL    Patient is seen by [facility] ERINN Coffey.  Never been admitted for paranoia or voices.        Substance Use and History:   Not reported per chart review        Past Medical History:   PAST MEDICAL HISTORY:   Past Medical History:   Diagnosis Date    Atrial fibrillation (H)     Depression     Hypertension     Malignant neoplasm of endocervix (H)     Tx with radiation    Other chronic pain     Low back    Schizophrenia (H)     Urinary incontinence        PAST SURGICAL HISTORY:   Past Surgical History:   Procedure Laterality Date    ABDOMINOPLASTY      BIOPSY CERVICAL, LOCAL EXCISION, SINGLE/MULTIPLE  10/26/2011    Procedure:BIOPSY " CERVICAL, LOCAL EXCISION, SINGLE/MULTIPLE; EUA, cervical biopsies; Surgeon:BETTY TINEO; Location:[facility] OR    BIOPSY VAGINAL N/A 6/8/2021    Procedure: BIOPSY, VAGINA;  Surgeon: Dany Perez MD;  Location: [facility] OR    CYSTOSCOPY N/A 5/17/2024    Procedure: Cystoscopy;  Surgeon: Dany Perez MD;  [facility]: [facility]    CYSTOSTOMY, INSERT TUBE SUPRAPUBIC, COMBINED  7/12/2024    Procedure: Insertion of supra-pubic catheter;  Surgeon: Dany Perez MD;  [facility]: [facility]    DILATION AND CURETTAGE, WITH ULTRASOUND GUIDANCE N/A 5/17/2024    Procedure: Dilation and curettage of the uterus with drainage of uterine fluid under ultrasound guidance, lysis of vaginal adhesions;  Surgeon: Dany Perez MD;  Location: [facility]    EXAM UNDER ANESTHESIA PELVIC N/A 3/12/2020    Procedure: Exam under anesthsia, vaginal biopsies, possible CO2 laser of the vagina;  Surgeon: Lilliam Roy MD;  [facility]    GI SURGERY  2004    gastric bypass    HYSTERECTOMY TOTAL ABDOMINAL, BILATERAL SALPINGO-OOPHORECTOMY, COMBINED Bilateral 7/12/2024    Procedure: Diagnostic laparoscopy converted to exploratory laparotomy, total abdominal hysterectomy, bilateral salpingo-oophorectomy, lysis of adhesions >60 min;  Surgeon: Dany Perez MD;  [facility]    LASER CO2 VAGINA N/A 3/2/2015    Procedure: LASER CO2 VAGINA;  Surgeon: Mariela Abdalla MD;  Location: [facility]    LASER CO2 VAGINA N/A 5/24/2019    Procedure: Exam under anesthesia, vaginal biopsies, CO2 laser of the vagina;  Surgeon: Lilliam Roy MD;  Location: [facility]    LASER CO2 VAGINA N/A 6/8/2021    Procedure: Exam under anesthesia, laser ablation of the upper vagina;  Surgeon: Dany Perez MD;  Location: [facility]    REPAIR BLADDER N/A 7/12/2024    Procedure: Repair bladder;  Surgeon: Dany Perez MD;  [facility]             Family History:   FAMILY HISTORY:   Family History   Problem Relation Age of Onset    Heart Disease Father     Heart Failure  Father     No Known Problems Brother     No Known Problems Brother     No Known Problems Brother     Pancreatitis Brother     Hypertension Sister     Peripheral Vascular Disease Sister     No Known Problems Sister     No Known Problems Son     No Known Problems Daughter            Social History:   No recent history regarding her social status per chart review, patient unable to clarify social history for now given her ongoing encephalopathy.  She has been admitted to TCU recently.         Physical ROS:   The 10 point Review of Systems is negative other than noted in the HPI or here.           Medications:     Current Facility-Administered Medications   Medication Dose Route Frequency Provider Last Rate Last Admin    acetaminophen (TYLENOL) tablet 650 mg  650 mg Oral Q6H Anali Rodríguez MD   650 mg at 07/30/24 0937    apixaban ANTICOAGULANT (ELIQUIS) tablet 2.5 mg  2.5 mg Oral BID Anali Rodríguez MD   2.5 mg at 07/30/24 0938    atenolol (TENORMIN) tablet 100 mg  100 mg Oral Daily Anali Rodríguez MD   100 mg at 07/30/24 0937    [Held by provider] benztropine (COGENTIN) tablet 1 mg  1 mg Oral Daily Anali Rodríguez MD        [Held by provider] furosemide (LASIX) tablet 40 mg  40 mg Oral Daily Anali Rodríguez MD        [Held by provider] gabapentin (NEURONTIN) tablet 1,600 mg  1,600 mg Oral BID Anali Rodríguez MD        melatonin tablet 1 mg  1 mg Oral At Bedtime Anali Rodríguez MD        metroNIDAZOLE (FLAGYL) tablet 500 mg  500 mg Oral TID Anali Rodríguez MD   500 mg at 07/30/24 0938    polyethylene glycol (MIRALAX) Packet 17 g  17 g Oral Daily Anali Rodríguez MD        [Held by provider] QUEtiapine (SEROquel) tablet 400 mg  400 mg Oral At Bedtime Anali Rodríguez MD        sertraline (ZOLOFT) tablet 150 mg  150 mg Oral Daily Anali Rodríguez MD   150 mg at 07/30/24 0937    sodium chloride (PF) 0.9% PF flush 3 mL  3 mL Intracatheter Q8H Anali Rodríguez MD   3 mL at 07/30/24 0324    sulfamethoxazole-trimethoprim (BACTRIM DS) 800-160  MG per tablet 1 tablet  1 tablet Oral BID Anali Rodríguez MD   1 tablet at 07/30/24 0938              Allergies:     Allergies   Allergen Reactions    Ibuprofen Nausea and Vomiting    Shrimp     Sulfa Antibiotics Rash     Patient started on bactrim 7/24/24 tolerating medication without rash on 7/25           Labs:     Recent Results (from the past 48 hour(s))   Extra Blue Top Tube    Collection Time: 07/29/24  4:48 PM   Result Value Ref Range    Hold Specimen JIC    Extra Red Top Tube    Collection Time: 07/29/24  4:48 PM   Result Value Ref Range    Hold Specimen JIC    Extra Green Top (Lithium Heparin) Tube    Collection Time: 07/29/24  4:48 PM   Result Value Ref Range    Hold Specimen JIC    Extra Purple Top Tube    Collection Time: 07/29/24  4:48 PM   Result Value Ref Range    Hold Specimen JIC    Extra Green Top (Lithium Heparin) ON ICE    Collection Time: 07/29/24  4:48 PM   Result Value Ref Range    Hold Specimen JIC    Comprehensive metabolic panel    Collection Time: 07/29/24  4:48 PM   Result Value Ref Range    Sodium 138 135 - 145 mmol/L    Potassium 4.6 3.4 - 5.3 mmol/L    Carbon Dioxide (CO2) 25 22 - 29 mmol/L    Anion Gap 7 7 - 15 mmol/L    Urea Nitrogen 11.6 8.0 - 23.0 mg/dL    Creatinine 1.26 (H) 0.51 - 0.95 mg/dL    GFR Estimate 45 (L) >60 mL/min/1.73m2    Calcium 8.4 (L) 8.8 - 10.4 mg/dL    Chloride 106 98 - 107 mmol/L    Glucose 94 70 - 99 mg/dL    Alkaline Phosphatase 66 40 - 150 U/L    AST 16 0 - 45 U/L    ALT <5 0 - 50 U/L    Protein Total 5.8 (L) 6.4 - 8.3 g/dL    Albumin 2.5 (L) 3.5 - 5.2 g/dL    Bilirubin Total 0.2 <=1.2 mg/dL   Lipase    Collection Time: 07/29/24  4:48 PM   Result Value Ref Range    Lipase 48 13 - 60 U/L   Magnesium    Collection Time: 07/29/24  4:48 PM   Result Value Ref Range    Magnesium 1.9 1.7 - 2.3 mg/dL   Troponin T, High Sensitivity    Collection Time: 07/29/24  4:48 PM   Result Value Ref Range    Troponin T, High Sensitivity 21 (H) <=14 ng/L   CBC with platelets and  differential    Collection Time: 07/29/24  4:48 PM   Result Value Ref Range    WBC Count 7.6 4.0 - 11.0 10e3/uL    RBC Count 3.36 (L) 3.80 - 5.20 10e6/uL    Hemoglobin 8.1 (L) 11.7 - 15.7 g/dL    Hematocrit 26.2 (L) 35.0 - 47.0 %    MCV 78 78 - 100 fL    MCH 24.1 (L) 26.5 - 33.0 pg    MCHC 30.9 (L) 31.5 - 36.5 g/dL    RDW 25.6 (H) 10.0 - 15.0 %    Platelet Count 352 150 - 450 10e3/uL    % Neutrophils 66 %    % Lymphocytes 22 %    % Monocytes 9 %    % Eosinophils 1 %    % Basophils 1 %    % Immature Granulocytes 1 %    NRBCs per 100 WBC 0 <1 /100    Absolute Neutrophils 5.1 1.6 - 8.3 10e3/uL    Absolute Lymphocytes 1.6 0.8 - 5.3 10e3/uL    Absolute Monocytes 0.7 0.0 - 1.3 10e3/uL    Absolute Eosinophils 0.1 0.0 - 0.7 10e3/uL    Absolute Basophils 0.1 0.0 - 0.2 10e3/uL    Absolute Immature Granulocytes 0.1 <=0.4 10e3/uL    Absolute NRBCs 0.0 10e3/uL   EKG 12-lead, tracing only    Collection Time: 07/29/24  4:50 PM   Result Value Ref Range    Systolic Blood Pressure  mmHg    Diastolic Blood Pressure  mmHg    Ventricular Rate 47 BPM    Atrial Rate 47 BPM    TX Interval 192 ms    QRS Duration 72 ms     ms    QTc 417 ms    P Axis 6 degrees    R AXIS -10 degrees    T Axis -2 degrees    Interpretation ECG       Sinus bradycardia  Minimal voltage criteria for LVH, may be normal variant  Anterolateral infarct , age undetermined  Abnormal ECG     Blood gas venous    Collection Time: 07/29/24  5:09 PM   Result Value Ref Range    pH Venous 7.34 7.32 - 7.43    pCO2 Venous 53 (H) 40 - 50 mm Hg    pO2 Venous 19 (L) 25 - 47 mm Hg    Bicarbonate Venous 28 21 - 28 mmol/L    Base Excess/Deficit Venous 2.0 -3.0 - 3.0 mmol/L    FIO2 38     Oxyhemoglobin Venous 19 (L) 70 - 75 %    O2 Sat, Venous 19.4 (L) 70.0 - 75.0 %   Asymptomatic Influenza A/B, RSV, & SARS-CoV2 PCR (COVID-19) Nasopharyngeal    Collection Time: 07/29/24  6:01 PM    Specimen: Nasopharyngeal; Swab   Result Value Ref Range    Influenza A PCR Negative Negative     Influenza B PCR Negative Negative    RSV PCR Negative Negative    SARS CoV2 PCR Negative Negative   Blood Culture Peripheral Blood    Collection Time: 07/29/24  6:10 PM    Specimen: Peripheral Blood   Result Value Ref Range    Culture No growth after 12 hours    UA with Microscopic reflex to Culture    Collection Time: 07/29/24  7:46 PM    Specimen: Urine, Shahid Catheter   Result Value Ref Range    Color Urine Yellow Colorless, Straw, Light Yellow, Yellow    Appearance Urine Slightly Cloudy (A) Clear    Glucose Urine Negative Negative mg/dL    Bilirubin Urine Negative Negative    Ketones Urine Negative Negative mg/dL    Specific Gravity Urine 1.011 1.003 - 1.035    Blood Urine Small (A) Negative    pH Urine 7.5 (H) 5.0 - 7.0    Protein Albumin Urine 50 (A) Negative mg/dL    Urobilinogen Urine Normal Normal, 2.0 mg/dL    Nitrite Urine Negative Negative    Leukocyte Esterase Urine Large (A) Negative    WBC Clumps Urine Present (A) None Seen /HPF    Mucus Urine Present (A) None Seen /LPF    RBC Urine 12 (H) <=2 /HPF    WBC Urine 78 (H) <=5 /HPF    Squamous Epithelials Urine 2 (H) <=1 /HPF    Transitional Epithelials Urine <1 <=1 /HPF    Hyaline Casts Urine 2 <=2 /LPF    Granular Casts Urine 1 (H) None Seen /LPF   Lactic acid whole blood with 1x repeat in 2 hr when >2    Collection Time: 07/29/24  7:49 PM   Result Value Ref Range    Lactic Acid, Initial 1.0 0.7 - 2.0 mmol/L   Troponin T, High Sensitivity    Collection Time: 07/29/24  7:49 PM   Result Value Ref Range    Troponin T, High Sensitivity 18 (H) <=14 ng/L   Comprehensive metabolic panel    Collection Time: 07/30/24  5:51 AM   Result Value Ref Range    Sodium 142 135 - 145 mmol/L    Potassium 4.3 3.4 - 5.3 mmol/L    Carbon Dioxide (CO2) 24 22 - 29 mmol/L    Anion Gap 5 (L) 7 - 15 mmol/L    Urea Nitrogen 9.6 8.0 - 23.0 mg/dL    Creatinine 1.28 (H) 0.51 - 0.95 mg/dL    GFR Estimate 45 (L) >60 mL/min/1.73m2    Calcium 7.9 (L) 8.8 - 10.4 mg/dL    Chloride 113 (H) 98  - 107 mmol/L    Glucose 78 70 - 99 mg/dL    Alkaline Phosphatase 54 40 - 150 U/L    AST 11 0 - 45 U/L    ALT <5 0 - 50 U/L    Protein Total 4.9 (L) 6.4 - 8.3 g/dL    Albumin 2.1 (L) 3.5 - 5.2 g/dL    Bilirubin Total <0.2 <=1.2 mg/dL   CBC with platelets    Collection Time: 07/30/24  5:51 AM   Result Value Ref Range    WBC Count 5.7 4.0 - 11.0 10e3/uL    RBC Count 2.83 (L) 3.80 - 5.20 10e6/uL    Hemoglobin 6.7 (LL) 11.7 - 15.7 g/dL    Hematocrit 22.4 (L) 35.0 - 47.0 %    MCV 79 78 - 100 fL    MCH 23.7 (L) 26.5 - 33.0 pg    MCHC 29.9 (L) 31.5 - 36.5 g/dL    RDW 25.6 (H) 10.0 - 15.0 %    Platelet Count 271 150 - 450 10e3/uL   Phosphorus    Collection Time: 07/30/24  5:51 AM   Result Value Ref Range    Phosphorus 3.1 2.5 - 4.5 mg/dL   Magnesium    Collection Time: 07/30/24  5:51 AM   Result Value Ref Range    Magnesium 1.8 1.7 - 2.3 mg/dL   CBC with platelets    Collection Time: 07/30/24  8:17 AM   Result Value Ref Range    WBC Count 6.0 4.0 - 11.0 10e3/uL    RBC Count 3.00 (L) 3.80 - 5.20 10e6/uL    Hemoglobin 7.2 (L) 11.7 - 15.7 g/dL    Hematocrit 23.7 (L) 35.0 - 47.0 %    MCV 79 78 - 100 fL    MCH 24.0 (L) 26.5 - 33.0 pg    MCHC 30.4 (L) 31.5 - 36.5 g/dL    RDW 25.4 (H) 10.0 - 15.0 %    Platelet Count 265 150 - 450 10e3/uL   Adult Type and Screen    Collection Time: 07/30/24  8:17 AM   Result Value Ref Range    ABO/RH(D) O POS     Antibody Screen Negative Negative    SPECIMEN EXPIRATION DATE [id number]           Physical and Psychiatric Examination:     BP 94/49 (BP Location: Right arm)   Pulse 57   Temp 97.3  F (36.3  C) (Oral)   Resp 16   SpO2 98%   Weight is 0 lbs 0 oz  There is no height or weight on file to calculate BMI.    Physical Exam:  I have reviewed the physical exam as documented by by the medical team and agree with findings and assessment and have no additional findings to add at this time.         MSE:   Appearance: adequately groomed, dressed in hospital scrubs, appeared as age stated, well  "groomed, and sleepy  Attitude:  cooperative  Eye Contact:  good  Mood:  \"OK\"  Affect:  mood congruent  Speech:  clear, coherent and mild on and off dysarthria  Psychomotor Behavior:  no evidence of tardive dyskinesia, dystonia, or tics  Muscle strength and tone: intact   Thought Process:  disorganized  Associations:  loosening of associations present  Thought Content:  no evidence of suicidal ideation or homicidal ideation, no evidence of psychotic thought, no auditory hallucinations present, and no visual hallucinations present  Insight:  partial  Judgement:  limited  Oriented to:  time, person, and place  Attention Span and Concentration:  limited  Recent and Remote Memory:  limited      QTc: 417 ms (on 7/29)         DSM-5 Diagnosis:   Past diagnosis of Schizophrenia  RAFAEL   Unspecified neurocognitive Disorder  Recent Post-op of abdominal surgery  Recent UTI          Assessment:   Aranza is 69 year old with past medical history of schizophrenia recently hospitalized for abdominal surgery from 7/12 to 7/17 and readmitted from 7/21 to 7/26 for post-surgical pain management and UTI that was transferred to TCU. She was brought in yesterday from TCU given concern for altered mental status. It was noticed that during hospitalization she was not being given the CNS depressing medications, but after coming to TCU she has been probably receiving them around the clock. Our suspicion is that the AMS can be medication induced / toxicity. Would resume Quetiapine at lower dose.          Summary of Recommendations:   Continue to hold Gabapentin until tomorrow and then resume at 300 mg TID (considering low GFR)  Would resume Quetiapine 100 mg at bedtime with plan to increase 200 mg tomorrow night  Discontinue the Benztropine for now, if she develops EP side effects can consider to resume it  Resume Ambien 10 mg at bedtime as needed    Shorty Graham MD  Fellow    I Mian Serna MD personally saw and examined the " "patient along with the resident.  I discussed the case and agree with the findings and plan as documented in this note.      Contact me as needed.  Mian Serna M.D.   Consult Liaison Psychiatry   [facility]   Contact on Vocera  If I am not available, then [facility] CL line ([phone]) should know who   Is covering our consult service.         \"This dictation was performed with voice recognition software and may contain errors,  omissions and inadvertent word substitution.\"           "

## 2024-07-30 NOTE — CONSULTS
[facility]  Neurology Consultation    Patient Name:  Alis Hartman  MRN:  [id number]    :  1955  Date of Service:  2024  Primary care provider:  Maria Fernanda Eckert          Chief Complaint: Encephalopathy      History of Present Illness:   Aranza is a 69 year old female with past medical history significant for paranoid schizophrenia, depression/anxiety, and serous uterine carcinoma who recently underwent exploratory laparotomy, total abdominal hysterectomy, bilateral salpingo-oophorectomy, and lysis of adhesions on 24.  Course was complicated by small bowel obstruction and UTI, now treated and resolved.    Patient was discharged to TCU after hospital stay for her surgery.  Patient has been started on several CNS acting medications since her discharge on 2024 including tizanidine, Robaxin, scheduled Seroquel, oxybutynin, benztropine and Ambien.  Per daughter, Joy, patient was only taking Seroquel as needed (and per daughter was not taking it at all recently) prior to her surgery on 2024.  Since discharge to the TCU however she has been receiving 400 mg nightly scheduled.  Daughter states that has been making the patient sick (nausea and vomiting) and sleepy, however she still been getting the medication in her applesauce.  Was seen by an outpatient family physician for this gradually progressive somnolence/altered mental status.  There was concern brought up for potential aphasia, so patient was sent to the emergency department for further evaluation.    Workup in the emergency department included CT head, which was within normal limits, as well as some basic labs which were negative for an elevated white count.  She did however have an elevated creatinine at 1.26 (her baseline appears to be approximately 0.7 - 0.8) with a GFR 46.  Otherwise no other infectious or metabolic abnormalities were identified.  Her UA did show some abnormalities,  however her nitrate was negative (was positive on her last check for which she was treated with antibiotics).    During my visit with the patient, her eyes are residential open, she is intermittently participatory in conversation and exam.  She mumbles most of her words, but is however able to speak more clearly after noxious stimulation is applied.  She is able to tell me her name, age and where she is (she is at hospital).  She is able to follow all simple commands with persistent prompting.      ROS  A 10-point ROS was performed as per HPI.     PMH  Past Medical History:   Diagnosis Date    Atrial fibrillation (H)     Depression     Hypertension     Malignant neoplasm of endocervix (H)     Tx with radiation    Other chronic pain     Low back    Schizophrenia (H)     Urinary incontinence      Past Surgical History:   Procedure Laterality Date    ABDOMINOPLASTY      BIOPSY CERVICAL, LOCAL EXCISION, SINGLE/MULTIPLE  10/26/2011    Procedure:BIOPSY CERVICAL, LOCAL EXCISION, SINGLE/MULTIPLE; EUA, cervical biopsies; Surgeon:BETTY TINEO; Location:[facility] OR    BIOPSY VAGINAL N/A 6/8/2021    Procedure: BIOPSY, VAGINA;  Surgeon: Dany Perez MD;  Location: [facility] OR    CYSTOSCOPY N/A 5/17/2024    Procedure: Cystoscopy;  Surgeon: Dany Perez MD;  Location: [facility] OR    CYSTOSTOMY, INSERT TUBE SUPRAPUBIC, COMBINED  7/12/2024    Procedure: Insertion of supra-pubic catheter;  Surgeon: Dany Perez MD;  Location: [facility]    DILATION AND CURETTAGE, WITH ULTRASOUND GUIDANCE N/A 5/17/2024    Procedure: Dilation and curettage of the uterus with drainage of uterine fluid under ultrasound guidance, lysis of vaginal adhesions;  Surgeon: Dany Perez MD;  Location: [facility]    EXAM UNDER ANESTHESIA PELVIC N/A 3/12/2020    Procedure: Exam under anesthsia, vaginal biopsies, possible CO2 laser of the vagina;  Surgeon: Lilliam Roy MD;  Location: [facility]    GI SURGERY  2004    gastric bypass    HYSTERECTOMY TOTAL ABDOMINAL,  BILATERAL SALPINGO-OOPHORECTOMY, COMBINED Bilateral 7/12/2024    Procedure: Diagnostic laparoscopy converted to exploratory laparotomy, total abdominal hysterectomy, bilateral salpingo-oophorectomy, lysis of adhesions >60 min;  Surgeon: Dany Perez MD;  Location: [facility]    LASER CO2 VAGINA N/A 3/2/2015    Procedure: LASER CO2 VAGINA;  Surgeon: Mariela Abdalla MD;  Location: [facility]    LASER CO2 VAGINA N/A 5/24/2019    Procedure: Exam under anesthesia, vaginal biopsies, CO2 laser of the vagina;  Surgeon: Lilliam Roy MD;  Location: [facility]    LASER CO2 VAGINA N/A 6/8/2021    Procedure: Exam under anesthesia, laser ablation of the upper vagina;  Surgeon: Dany Perez MD;  Location: [facility]    REPAIR BLADDER N/A 7/12/2024    Procedure: Repair bladder;  Surgeon: Dany Perez MD;  Location: [facility]       Medications   I have personally reviewed the patient's medication list.     Allergies  I have personally reviewed the patient's allergy list.     Social History  Did not assess    Family History    Did not assess      Physical Examination   Vitals: BP 92/68   Pulse 53   Temp 97.2  F (36.2  C) (Axillary)   Resp 12   SpO2 100%   General: Lying in bed, NAD, tired appearing  HEENT: NC/AT, no icterus, op pink and moist  Cardiac: RRR  Chest: non-labored on RA  Abdomen: ND  Extremities: Warm, no edema  Skin: No rash or lesion   Neuro:  Mental status: Awake, eyes FPC open, tired appearing, oriented to self, time, place, and circumstance after asking several times. Language is fluent, naming is intact, repetition is intact (though speech is not always clear), she follows all simple commands with variable amount of prompting.  Cranial nerves: VFF, PERRL, conjugate gaze, EOMI, face grossly symmetric, shoulder shrug strong, tongue/uvula midline, no dysarthria.  She has intermittent uncontrolled movements of the jaw and tongue.  Motor: Normal bulk and tone.  Patient is intermittently fidgety, but no  rigidity or spasticity noted. No drift in the upper extremities bilaterally, bilateral lower extremities drift quickly to bed.    Reflexes: Normal reflexes throughout.  Negative Bender, no clonus, toes down-going.  Sensory: Intact to light touch, and noxious stimulation in all 4 extremities  Coordination: Finger-nose-finger intact in the bilateral upper extremities.  Gait: Deferred      Labs  BMP  Recent Labs   Lab 07/29/24  1648 07/26/24  0547 07/25/24  0704 07/24/24  0554    134* 135 137   POTASSIUM 4.6 4.3 4.1 4.1   CHLORIDE 106 102 102 103   CO2 25 25 27 26   BUN 11.6 9.6 8.2 8.5   CR 1.26* 1.05* 0.96* 0.77   SAMUEL 8.4* 7.8* 7.7* 7.8*       CBC  Recent Labs   Lab 07/29/24  1648 07/25/24  0704 07/24/24  0554 07/23/24  0636   WBC 7.6 9.0 9.1 7.4   HGB 8.1* 7.8* 8.1* 7.2*    400 435 382         Radiological Data  Recent Results (from the past 48 hour(s))   CT Head w/o Contrast    Narrative    EXAM: CT HEAD W/O CONTRAST  LOCATION: [facility]  DATE: 7/29/2024    INDICATION: AMS/confusion  COMPARISON: None.  TECHNIQUE: Routine CT Head without IV contrast. Multiplanar reformats. Dose reduction techniques were used.    FINDINGS:  INTRACRANIAL CONTENTS: No intracranial hemorrhage, extraaxial collection, or mass effect.  No CT evidence of acute infarct. Normal parenchymal attenuation. Normal ventricles and sulci.     VISUALIZED ORBITS/SINUSES/MASTOIDS: No intraorbital abnormality. No paranasal sinus mucosal disease. No middle ear or mastoid effusion.    BONES/SOFT TISSUES: No acute abnormality. Calvarium demonstrates scattered small lucencies non specific likely incidental hemangiomas in the absence of a known malignancy.      Impression    IMPRESSION:  1.  No acute intracranial process.          Investigations   I have personally reviewed pertinent labs, tests, and radiological imaging. Discussion of notable findings is included under Impression.     Was patient  transferred from outside hospital?   No    Impression    # Toxic/metabolic encephalopathy in the setting of polypharmacy  # MADHU    This patient is a 69-year-old female with past medical history of recent pelvic surgery complicated by small bowel obstruction and UTI.  She is presenting to the emergency department today for concern for increasing somnolence and altered mental status.  Oncologic gynecology saw the patient and feel that underlying infection in the setting of her recent surgery is less likely the cause of her presentation at this time.  Neurology therefore consulted to assist with diagnosis and management of altered mental status.    Overall impression at this time is that this patient is experiencing medication toxicity in the setting of multiple CNS acting medications on board.  High suspicion that high-dose Seroquel (without titration) is the main culprit at this time.  Oxybutynin, Ambien, Cogentin, Robaxin, tizanidine, and gabapentin may also be contributing, but difficult to know how much of each she has been receiving as some of these are currently prescribed as needed.  At this time, recommend holding all CNS acting medications and waiting for the patient to clear.  Suspect that she will start to improve over the course of the day tomorrow, but unknown at this time how long it will take.  She currently has evidence of an acute kidney injury, with a GFR of 46.  This may impact her ability to quickly clear the medications that are currently on board.  We will continue to follow the patient.  If she is not clearing over the course of the next few days, would at that time consider further dedicated neurologic workup to rule out other more rare causes of encephalopathy.    Recommendations  -Hold Seroquel, Tizanidine, Robaxin, Ambien, Cogentin, oxybutynin, oxycodone and gabapentin  -Recommend Tylenol only for pain control for now  -Okay to continue Zoloft  -Delirium precautions  -Frequent reorientation,  "lights on during the day/windows open, melatonin scheduled nightly  -We will continue to follow and monitor for clearance  -If not clearing over the course of the next few days, will consider further neurological workup/investigation at that time    Patient was discussed with Dr. Archibald.    MEHDI Escobar D.O.  Resident Physician of Neurology  [facility]    DAY TEAM ADDENDUM:   Subjective:   Patient appears much less somnolent than she was overnight. However, still somewhat confused - thought she came to the hospital straight from home and thought she was here for a surgery    Exam:   Mental status: Awake, alert, oriented to month, year, and location (hospital) but with somewhat slowed responses. Not completely oriented to situation per above. Spells WORLD forwards but not backwards. Unable to count down from 100 by serial 7's. Follows complex 2 step and intermittently 3 step commands .    Motor: 5/5 strength in all extremities except 3/5 strength with R hip flexion, limited by buttock and \"achy\" pain from her hip radiating down to her thigh. Denies any numbness, tingling, or paresthesias  Sensory: Intact to light touch in all extremities   Coordination: FNF intact     Assessment:   Agree with Dr. Escobar's assessment that, overnight, patient likely was overly somnolent due to polypharmacy. Very unclear what dose of seroquel patient is supposed to be on - has not seen a psychiatrist since April (telemedicine visit). Notes state seroquel 400 mg BID; however, patient reports 400 at bedtime; but then other notes state she had not been taking it at all. R hip flexion weakness appears to be baseline in setting of chronic back pain; however, it does not appear that patient's description of pain is consistent with radiculopathy or neuropathy. She endorses no numbness of paresthesia's. Rather, she endorses \"achy\" pain suggestive of musculoskeletal pain, possibly in setting of osteoarthritis and " known buttock wound.    Plan:   -Discuss with patient's psychiatrist v.s. psychiatry consult to clarify seroquel dose that would be helpful for patient's history of paranoid schizophrenia   -Continue PTA zoloft  -Stop benztropine, no extrapyramidal symptoms on our exam  -Stop tizanidine and robaxin as patient has no significant spasticity on exam and it appears she has not required it at TCU  -Stop ambien as it does not appear she has required it at TCU  - Stop gabapentin, as patient does not endorse neuropathic pain  - Defer management of oxycodone / oxybutynin to primary team.     -Neurology will sign off. Please do not hesitate to contact us with further questions or concerns.    Deborah Chan MD  PGY-4 Neurology Resident

## 2024-07-30 NOTE — PROGRESS NOTES
Spoke with patient's daughter Joy over the phone. Provided updates to her. Admitted for neurologic symptoms/confusion. Continued work up BC and UC pending. Discussed concern regarding possible medication side effects. Neurology consulted to assist with work up. Medications adjusted. Anemic hgb currently 7.2. Discussed if hgb <7.0 would recommend [facility] transfusion. Discussed possible need for blood transfusion and possible risks of blood transfusion ie risk of reaction, blood borne pathogen, fluid overload, ect. Patient signed consent form. Daughter agrees to blood transfusion if needed and would like to be called if she does need blood. OT/PT consulted. Discussed likely need to return to TCU on discharge. Patients daughter would like her closer to [facility]. Care coordinator/social work consult placed.   Kelsey Mccracken, APRN, DNP, CNP  7/30/2024 9:30 AM

## 2024-07-30 NOTE — DISCHARGE SUMMARY
Gynecologic Oncology Discharge Summary    Alis Hartman  [id number]    Admit Date: 7/29/2024  Discharge Date: 8/9/2024  Admitting Provider: Yeimy Muniz MD  Discharge Provider: Dany Perez MD    Admission Dx:   - Serous uterine carcinoma, s/p SNEHA/BSO  - History of REMA-III, s/p laser therapy  - History of cevical cancer, s/p radiation therapy  - Confusion  - Dysarthria  - Dyskinesia  - Schizophrenia  - Depression  - Recent urinary tract infection  - Recent superficial surgical site infection  - Recent intraoperative cystotomy  - Suprapubic catheter in place  - Shahid catheter in place  - MADHU  - Chronic kidney disease  - Perianal skin breakdown  - History of gastric bypass  - Chronic pain  - Osteoarthritis  - Hypertension  - Atrial fibrillation, on chronic anticoagulation  - History of gastric bypass  - Constipation  - Acute on chronic anemia of chronic disease    Discharge Dx:  - Serous uterine carcinoma, s/p SNEHA/BSO  - History of REMA-III, s/p laser therapy  - History of cevical cancer, s/p radiation therapy  - Encephalopathy in setting of polypharmacy, resolved  - Dysarthria, resolved  - Dyskinesia, resolved   - Schizophrenia  - Depression  - Recent urinary tract infection  - Recent superficial surgical site infection  - Recent intraoperative cystotomy  - Suprapubic catheter in place  - Shahid catheter in place  - Acute on Chronic kidney disease  - Perianal skin breakdown  - History of gastric bypass  - Chronic pain  - Osteoarthritis  - Hypertension  - Atrial fibrillation, on chronic anticoagulation  - History of gastric bypass  - Constipation  - Acute on chronic anemia of chronic disease, s/p blood transfusion     Patient Active Problem List   Diagnosis    HSIL on Pap smear    Cervical cancer (H)    History of urinary tract infection    Vaginal dysplasia    VAIN III (vaginal intraepithelial neoplasia III)    NO SHOW    Carpal tunnel syndrome    Chronic low back pain    Gastric bypass status for obesity     Generalized anxiety disorder    Iron deficiency anemia    Knee pain    Opiate dependence, continuous (H)    Obesity    Osteoarthrosis    Paranoid schizophrenia, chronic condition (H)    Peripheral edema    Vitamin B12 deficiency    Vitamin D deficiency    Stage 3a chronic kidney disease (H)    Anemia due to chronic kidney disease    Thrombocytosis    Benign essential hypertension    Pre-operative cardiovascular examination    Paroxysmal atrial fibrillation (H)    S/P hysterectomy    Lower abdominal pain    Acute cystitis without hematuria    Dysarthria    MADHU (acute kidney injury) (H24)    AMS (altered mental status)    Carcinosarcoma of body of uterus (H)       Procedures:   - None     Prior to Admission Medications:  Medications Prior to Admission   Medication Sig Dispense Refill Last Dose    acetaminophen (TYLENOL) 325 MG tablet Take 2 tablets (650 mg) by mouth every 6 hours as needed for mild pain 24 tablet 0     albuterol (PROAIR HFA/PROVENTIL HFA/VENTOLIN HFA) 108 (90 Base) MCG/ACT inhaler Inhale 1-2 puffs into the lungs every 4 hours as needed for shortness of breath       apixaban ANTICOAGULANT (ELIQUIS) 2.5 MG tablet Take 1 tablet (2.5 mg) by mouth 2 times daily 20 tablet 0     atenolol (TENORMIN) 100 MG tablet TAKE 1 TABLET(100 MG) BY MOUTH DAILY FOR HIGH BLOOD PRESSURE       benztropine (COGENTIN) 1 MG tablet Take 1 tablet by mouth daily       calcium Citrate-vitamin D 500-400 MG-UNIT CHEW Take 1 tablet by mouth daily       childrens multivitamin w/iron (FLINTSTONES COMPLETE) 60 MG chewable tablet Take 2 tablets by mouth daily       cholecalciferol 125 MCG (5000 UT) CAPS Take 5000 mg 4 times a week       cyanocobalamin (CYANOCOBALAMIN) 1000 MCG/ML injection Inject 1 mL (1,000 mcg) into a muscle every month.       diclofenac (VOLTAREN) 1 % topical gel Apply to: Skin on  Both/All Knee for pain. Topical 1% gel, 4 g applied TOPICALLY to lower extremities 3 times daily PRN ;       ferrous sulfate (CASSANDRA-IN-SOL)  75 (15 FE) MG/ML oral drops Take 15 mg by mouth daily       furosemide (LASIX) 40 MG tablet Take 40 mg by mouth daily       gabapentin (NEURONTIN) 800 MG tablet Take 2 tablets by mouth 2 times daily       hydrocortisone 2.5 % cream Apply topically as needed       lidocaine (XYLOCAINE) 5 % external ointment Apply 1 Application topically as needed       methocarbamol (ROBAXIN) 500 MG tablet Take 1 tablet (500 mg) by mouth every 6 hours as needed for muscle spasms or other (abdominal pain/soreness)       [] metroNIDAZOLE (FLAGYL) 500 MG tablet Take 1 tablet (500 mg) by mouth 3 times daily for 6 days       naloxone (NARCAN) 4 MG/0.1ML nasal spray Spray 1 spray (4 mg) into one nostril alternating nostrils as needed for opioid reversal every 2-3 minutes until assistance arrives 2 each 0     [] oxyBUTYnin (DITROPAN) 5 MG tablet Take 1 tablet (5 mg) by mouth 3 times daily for 13 days 39 tablet 0     oxyCODONE (ROXICODONE) 5 MG tablet Take 1 tablet (5 mg) by mouth every 4 hours as needed for breakthrough pain (pain control or improvement in physical function. Hold dose for analgesic side effects.) 18 tablet 0     phenazopyridine (PYRIDIUM) 100 MG tablet Take 1 tablet (100 mg) by mouth 3 times daily as needed for urinary tract discomfort 90 tablet 3     polyethylene glycol (MIRALAX) 17 GM/Dose powder Take 17 g by mouth 2 times daily as needed for constipation       QUEtiapine (SEROQUEL) 400 MG tablet Take 400 mg by mouth at bedtime       senna-docusate (SENOKOT-S/PERICOLACE) 8.6-50 MG tablet Take 1 tablet by mouth 2 times daily 28 tablet 0     sertraline (ZOLOFT) 100 MG tablet Take 1.5 tablets by mouth daily       sulfamethoxazole-trimethoprim (BACTRIM DS) 800-160 MG tablet Take 1 tablet by mouth 2 times daily       tiZANidine (ZANAFLEX) 4 MG tablet Take 4 mg by mouth 3 times daily as needed       zolpidem (AMBIEN) 10 MG tablet Take 10 mg by mouth nightly as needed          Discharge Medications:     Review of  your medicines        UNREVIEWED medicines. Ask your doctor about these medicines        Dose / Directions   acetaminophen 325 MG tablet  Commonly known as: TYLENOL  Used for: Malignant neoplasm of overlapping sites of cervix (H)      Dose: 650 mg  Take 2 tablets (650 mg) by mouth every 6 hours as needed for mild pain  Quantity: 24 tablet  Refills: 0     albuterol 108 (90 Base) MCG/ACT inhaler  Commonly known as: PROAIR HFA/PROVENTIL HFA/VENTOLIN HFA      Dose: 1-2 puff  Inhale 1-2 puffs into the lungs every 4 hours as needed for shortness of breath  Refills: 0     apixaban ANTICOAGULANT 2.5 MG tablet  Commonly known as: ELIQUIS  Indication: Atrial Fibrillation Not Caused By A Heart Valve Problem  Used for: S/P hysterectomy, Paroxysmal atrial fibrillation (H), Malignant neoplasm of overlapping sites of cervix (H)      Dose: 2.5 mg  Take 1 tablet (2.5 mg) by mouth 2 times daily  Quantity: 20 tablet  Refills: 0     atenolol 100 MG tablet  Commonly known as: TENORMIN      TAKE 1 TABLET(100 MG) BY MOUTH DAILY FOR HIGH BLOOD PRESSURE  Refills: 0     benztropine 1 MG tablet  Commonly known as: COGENTIN      Dose: 1 tablet  Take 1 tablet by mouth daily  Refills: 0     calcium Citrate-vitamin D 500-400 MG-UNIT Chew      Dose: 1 tablet  Take 1 tablet by mouth daily  Refills: 0     childrens multivitamin w/iron 60 MG chewable tablet      Dose: 2 tablet  Take 2 tablets by mouth daily  Refills: 0     cholecalciferol 125 MCG (5000 UT) Caps      Take 5000 mg 4 times a week  Refills: 0     cyanocobalamin 1000 MCG/ML injection  Commonly known as: CYANOCOBALAMIN      Inject 1 mL (1,000 mcg) into a muscle every month.  Refills: 0     diclofenac 1 % topical gel  Commonly known as: VOLTAREN      Apply to: Skin on  Both/All Knee for pain. Topical 1% gel, 4 g applied TOPICALLY to lower extremities 3 times daily PRN ;  Refills: 0     ferrous sulfate 75 (15 FE) MG/ML oral drops  Commonly known as: CASSANDRA-IN-SOL      Dose: 15 mg  Take 15 mg by  mouth daily  Refills: 0     furosemide 40 MG tablet  Commonly known as: LASIX      Dose: 40 mg  Take 40 mg by mouth daily  Refills: 0     gabapentin 800 MG tablet  Commonly known as: NEURONTIN      Dose: 2 tablet  Take 2 tablets by mouth 2 times daily  Refills: 0     hydrocortisone 2.5 % cream      Apply topically as needed  Refills: 0     lidocaine 5 % external ointment  Commonly known as: XYLOCAINE      Dose: 1 Application.  Apply 1 Application topically as needed  Refills: 0     methocarbamol 500 MG tablet  Commonly known as: ROBAXIN  Used for: S/P hysterectomy      Dose: 500 mg  Take 1 tablet (500 mg) by mouth every 6 hours as needed for muscle spasms or other (abdominal pain/soreness)  Refills: 0     metroNIDAZOLE 500 MG tablet  Commonly known as: FLAGYL  Indication: Infection of the Skin and/or Soft Tissue  Used for: S/P hysterectomy  Ask about: Should I take this medication?      Dose: 500 mg  Take 1 tablet (500 mg) by mouth 3 times daily for 6 days  Refills: 0     naloxone 4 MG/0.1ML nasal spray  Commonly known as: NARCAN  Used for: Malignant neoplasm of overlapping sites of cervix (H)      Dose: 4 mg  Spray 1 spray (4 mg) into one nostril alternating nostrils as needed for opioid reversal every 2-3 minutes until assistance arrives  Quantity: 2 each  Refills: 0     oxyBUTYnin 5 MG tablet  Commonly known as: DITROPAN  Used for: Bladder spasm  Ask about: Should I take this medication?      Dose: 5 mg  Take 1 tablet (5 mg) by mouth 3 times daily for 13 days  Quantity: 39 tablet  Refills: 0     oxyCODONE 5 MG tablet  Commonly known as: ROXICODONE  Used for: S/P hysterectomy      Dose: 5 mg  Take 1 tablet (5 mg) by mouth every 4 hours as needed for breakthrough pain (pain control or improvement in physical function. Hold dose for analgesic side effects.)  Quantity: 18 tablet  Refills: 0     phenazopyridine 100 MG tablet  Commonly known as: PYRIDIUM  Used for: Dysuria      Dose: 100 mg  Take 1 tablet (100 mg) by  mouth 3 times daily as needed for urinary tract discomfort  Quantity: 90 tablet  Refills: 3     polyethylene glycol 17 GM/Dose powder  Commonly known as: MIRALAX  Used for: S/P hysterectomy      Dose: 17 g  Take 17 g by mouth 2 times daily as needed for constipation  Refills: 0     QUEtiapine 400 MG tablet  Commonly known as: SEROquel      Dose: 400 mg  Take 400 mg by mouth at bedtime  Refills: 0     senna-docusate 8.6-50 MG tablet  Commonly known as: SENOKOT-S/PERICOLACE  Used for: S/P hysterectomy      Dose: 1 tablet  Take 1 tablet by mouth 2 times daily  Quantity: 28 tablet  Refills: 0     sertraline 100 MG tablet  Commonly known as: ZOLOFT      Dose: 1.5 tablet  Take 1.5 tablets by mouth daily  Refills: 0     sulfamethoxazole-trimethoprim 800-160 MG tablet  Commonly known as: BACTRIM DS  Indication: Infection of the Skin and/or Soft Tissue  Used for: S/P hysterectomy      Dose: 1 tablet  Take 1 tablet by mouth 2 times daily  Refills: 0     tiZANidine 4 MG tablet  Commonly known as: ZANAFLEX      Dose: 4 mg  Take 4 mg by mouth 3 times daily as needed  Refills: 0     zolpidem 10 MG tablet  Commonly known as: AMBIEN      Dose: 10 mg  Take 10 mg by mouth nightly as needed  Refills: 0            START taking        Dose / Directions   dexAMETHasone 4 MG tablet  Commonly known as: DECADRON      Dose: 8 mg  Start taking on: August 10, 2024  Take 2 tablets (8 mg) by mouth daily (with breakfast) for 3 days Take daily x 3 days following chemo  Quantity: 6 tablet  Refills: 0     ondansetron 8 MG tablet  Commonly known as: ZOFRAN      Dose: 8 mg  Take 1 tablet (8 mg) by mouth every 8 hours as needed for nausea  Quantity: 20 tablet  Refills: 0     prochlorperazine 5 MG tablet  Commonly known as: COMPAZINE      Dose: 5-10 mg  Take 1-2 tablets (5-10 mg) by mouth every 6 hours as needed for nausea or vomiting  Quantity: 40 tablet  Refills: 0               Where to get your medicines        These medications were sent to  "[facility] - [place], [place] - [address]  [address], [facility] [address]      Phone: [phone]   dexAMETHasone 4 MG tablet  ondansetron 8 MG tablet  prochlorperazine 5 MG tablet         Consultations:  - Neurology  - WOC  - Social Work  - Psychiatry  - Nutrition   - OT/PT  - Pharmacy     Brief History of Illness:  From H&P: \"69 year old female with serous uterine carcinoma s/p recent XL, SNEHA, BSO, EDIE with Dr. Perez c/b cystotomy on 7/12/2024 with suprapubic and urethral catheters in place. Also underwent laser treatment for REMA-III in 2021 and remote pelvic radiation for cervical cancer. Readmitted to Gynecologic Oncology service 7/21/2024 for management of SBO and UTI, discharged to TCU 7/26/2024. Now re-presenting to ED for evaluation of new/progressive neurological symptoms including confusion and dysarthria over the past two days. Also new dyskinetic movements appreciated on Gynecologic Oncology team evaluation that are new since recent admissions. Patient is on a second-generation antipsychotic in the setting of paranoid schizophrenia diagnosis, and exact recent dosing/outpatient compliance with this therapy is unclear. However, administration of this anti-dopaminergic agent in combination with several anti-cholinergics both chronically (Cogentin/benztropine, Vistaril/hydroxyzine) and acutely (Ditropan/oxybutynin course given with recent cystotomy management) raise concern for tardive dyskinesia in this elderly patient. Requested urgent Neurology evaluation in the ED to assess for this versus another acute neurologic condition. Their assessment was consistent with a dystonic reaction potentially due to multiple medications, which they recommended holding overnight.\"    Hospital Course:  Dz:   - Serous uterine carcinoma s/p XL, SNEHA, BSO, EDIE and cystotomy repair with suprapubic bladder insertion and omental flap on 7/12/24. TINY drain in place on admission and removed " per urology recommendations. Hx REMA-III s/p laser tx. Remote hx cervical cancer s/p radiation. Dr Perez recommended chemotherapy. She received chemotherapy education and cycle #1 of carboplatin/paclitaxel on 8/9/24. She will follow-up in clinic for close monitoring and prior to cycle #2 of treatment.   - Initially maintained on IVF, which were discontinued when PO fluid intake increased and MADHU resolved.    - Regular diet. Nutrition was consulted in the setting of suspected malnutrition, added supplements with meals. By discharge, she was tolerating a regular diet without nausea and vomiting and able to maintain her hydration without IVF supplementation.  Pain:   - Patient has a history of chronic pain and is on gabapentin and oxycodone. Oxycodone was initially held in the setting of new neurologic symptoms, but was resumed when she returned to baseline mental status. Gabapentin was also initially held, then resumed at a lower dose, but was ultimately discontinued per patient preference. In addition, she used Tylenol for pain management in house. Her pain was well controlled on this and she was discharged home with these medications.  CV:    - Patient has a history of hypertension and was continued on PTA atenolol during this hospitalization. PTA Lasix was held during this admission in the setting of MADHU, and was discontinued upon discharge. Her vital signs were stable while in house and she had no acute CV issues.  PULM:   - She has no history of pulmonary issues. Patient was supported with supplemental oxygen upon admission in the setting of altered mental status, which was weaned without difficulty. She had no acute pulmonary issues while in house.  HEME:   - Hgb was 8.1 on presentation in the setting of anemia of chronic disease. Hgb dropped to 6.6 thought to be dilutional. She was given 1 unit of PRBC and her Hgb robert appropriately. Her Hgb remained stable for the remainder of her hospital course and at time  of discharge.    - She had no acute heme issues while in house.  - During this admission she completed a full 28 day postoperative course of apixaban for anticoagulation, completed on 8/9/24.   GI:   - Patient received a scheduled bowel regimen of MiraLax in the setting of recent admission for an SBO.   - She had no acute GI issues while in house, continued to have regular bowel movements without diarrhea or constipation.   - Prophylactic and PRN antiemetics provided with chemo regimen. Discussed PRN antiemetics to take on discharge as needed.  :    - In the setting of a recent intraoperative cystotomy, the patient had a suprapubic catheter and urethral kent catheter in place. These had appropriate output throughout her admission. .   - Shortly following the time of initial cystotomy, patient was given an approximately 14-day course of oxybutynin for bladder spasms. This was continued at the time of recent hospital discharge 7/26/2024. It was discontinued on admission due to concern that anti-cholinergic effects might be contributing to current neurologic picture.    - Patient with underlying CKD found to have Cr elevation to 1.26 on presentation to ED thought to be secondary to dehydration. Cr downtrending by 7/31, returned to baseline by time of discharge 0.83 and maintained appropriate UOP while in house. Kent catheter removed on 8/9/24, discharged with suprapubic catheter in place with plan for outpatient follow-up with Urology.  ID:   - The patient was afebrile and without leukocytosis, lactic acidosis, or vital signs suggestive of sepsis during her hospitalization. Completed planned PO antibiotic regimen for recent UTI, as well as superficial surgical site infection (Bactrim/Flagyl x7 days ending 7/31/2024; did receive Zosyn x1 in the ED).   - Blood and urine cultures collected in the ED showed NGTD.   ENDO:   - No issues.  PSYCH/NEURO:   - Patient presented with new neurological symptoms including  confusion and dysarthria/dyskinesia. These symptoms were thought to be due to polypharmacy, specifically the combination of high-dose antipsychotics and anti-cholinergic/sedating agents. Suspected offending agents were held under the guidance of the consulting Neurology team, including:   - Cogentin/benztropine  - Seroquel/quetiapine  - Neurontin/gabapentin   - Ditropan/oxybutynin  - Oxycodone  - Robaxin/methocarbamol  - Zanaflex/tizanidine  - Ambien/zolpidem   - All of the above medications held on admission. Psychiatry was consulted and medications were adjusted and resumed per their recommendations. Patient declined medications due to concern for return of neurologic symptoms, and following discussion with psychiatry ultimately decdied to discontinue Seroquel, Sertraline, and Gabapentin per patient request. She will follow up with Psychiatry for further management as an outpatient.    - Additional Neurology team recommendations included administration of melatonin to support sleep and implementation of delirium precautions. With these interventions, the patient's symptoms improved and she returned to her baseline mentation by HD#4.  MSK/Skin:   - WOC team consulted during this admission for care of perianal skin breakdown and low incision wound recommendations, received routine wound cares while in house. She will continue wound care at home. OT/PT followed patient through out her hospital stay and was discharged home with home health care.  PPX:    - She was given SCDs and IS during her hospital course.  She tolerated these prophylactic interventions without incident.  They were discontinued at the time of her discharge. PTA prophylactic apixaban was given throughout her admission and discontinued on discharge (total of 28-day course planned post-op).     Discharge Instructions and Follow up:  Ms. Alis Hartman was discharged from the hospital with follow up for:     Discharge Diet: Regular  Discharge  Activity: Lifting restricted to 15 pounds, no driving while on narcotics, nothing per vagina for 6 weeks.  Discharge Follow up: Urology as scheduled. Gyn Onc NP 1-2 weeks. PCP and Psychiatry. Cycle #2 chemo.     Discharge Disposition:  Home with family and home health care OT/PT/RN    Discharge Staff: MD Robert Douglas MD   [facility] [employer]   Gynecologic Oncology, PGY-2     Gynecologic Oncology Attending Attestation  I have seen the patient day of discharge and agree with plan as documented in the note above.     Dany Perez MD  Gynecologic Oncology

## 2024-07-30 NOTE — CONSULTS
Care Management Initial Consult    General Information  Assessment completed with: -chart review, Children, [facility]  Type of CM/SW Visit: Initial Assessment    Primary Care Provider verified and updated as needed: Yes   Readmission within the last 30 days: previous discharge plan unsuccessful   Return Category: Progression of disease  Reason for Consult: discharge planning  Advance Care Planning: Advance Care Planning Reviewed: no concerns identified          Communication Assessment  Patient's communication style: spoken language (English or Bilingual)             Cognitive  Cognitive/Neuro/Behavioral:                        Living Environment:   People in home: grandchild(vernell), child(vernell), adult     Current living Arrangements: house      Able to return to prior arrangements:  (Anticipate will return to TCU)       Family/Social Support:  Care provided by: child(vernell)  Provides care for: no one  Marital Status: Single  Children          Description of Support System: Supportive, Involved    Support Assessment: Adequate family and caregiver support, Adequate social supports    Current Resources:   Patient receiving home care services: No     Community Resources: County Worker  Equipment currently used at home: cane, straight, walker, standard, shower chair  Supplies currently used at home: None    Employment/Financial:  Employment Status: retired        Financial Concerns: none   Referral to Financial Worker: No       Does the patient's insurance plan have a 3 day qualifying hospital stay waiver?  No    Lifestyle & Psychosocial Needs:  Social Determinants of Health     Food Insecurity: No Food Insecurity (4/13/2023)    [facility] from [facility], [facility]    Hunger Vital Sign     Worried About Running Out of Food in the Last Year: Never true     Ran Out of Food in the Last Year: Never true   Depression: Not at risk (2/28/2024)    PHQ-2     PHQ-2 Score: 2   Housing Stability: Not on file   Tobacco  Use: Low Risk  (7/26/2024)    Received from [facility]    Patient History     Smoking Tobacco Use: Never     Smokeless Tobacco Use: Never     Passive Exposure: Not on file   Financial Resource Strain: Not on file   Alcohol Use: Not on file   Transportation Needs: Not on file   Physical Activity: Not on file   Interpersonal Safety: Not on file   Stress: Not on file   Social Connections: Not on file   Health Literacy: Not on file       Functional Status:  Prior to admission patient needed assistance:   Dependent ADLs::  (Not assessed)  Dependent IADLs:: Cleaning, Cooking, Laundry, Transportation, Meal Preparation (Not assessed)       Mental Health Status:  Mental Health Status: Other (see comment) (Confusion noted)       Chemical Dependency Status:  Chemical Dependency Status: No Current Concerns             Values/Beliefs:  Spiritual, Cultural Beliefs, Buddhism Practices, Values that affect care: no               Additional Information:  Aranza is a 69 year old 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 Perez on 7/12/24. Her operation was complicated by a cystotomy which was repaired at the time of her operation with subsequent insertion of a supra-pubic catheter (which remains in place). She was recently readmitted 7/21-26/2024 with concern for a small bowel obstruction that resolved with conservative management. During that readmission, she also underwent treatment of a UTI and management of perianal skin breakdown.     Chart reviewed. Pt brought in from TCU. Pt confused at this time. Writer spoke with pt's daughter, Joy, (P: [phone]). Joy reports being unsatisfied with the current facility and wants to know if pt could be placed. Depending on how long pt will be here this may be able to be completed. Writer printed out a medicare.gov list and left it in pt's room  for Joy to review when she returns to [facility]. Joy reported be unsatisfied with the current facility, which is [facility].       Social work will continue to follow and provide assistance to ensure a safe and timely discharge.     ________________    WILLIAM Fritz, [facility]  ED/Observation   [facility]  Phone: [phone]  Fax: [phone]    After hours [facility] [place] and After Hours [facility] [place]  Available from 4:00pm - Midnight

## 2024-07-30 NOTE — PROGRESS NOTES
"Brief Gyn Onc Progress Note    Given concern for \"medication toxicity in the setting of multiple CNS medications\" per Neurology consultation, called TCU where patient was recently housed from 7/26-29 to confirm exactly which medications she did and did not receive from her medication list over the past 2-3 days.    Italicized medications are currently held per Neurology recommendations.    CNS medications confirmed administered at TCU:  - Cogentin/benztropine  - Seroquel/quetiapine  - Zoloft/sertraline  - Neurontin/gabapentin  - Ditropan/oxybutynin  - Oxycodone    CNS medication confirmed NOT administered at TCU:  - Robaxin/methocarbamol  - Zanaflex/tizanidine  - Ambien/zolpidem    Continue to appreciate Neurology team recommendations regarding when/if to resume held medications.    Anali Rodríguez MD, PGY-4  1:44 AM  [facility] Obstetrics & Gynecology Residency    "

## 2024-07-30 NOTE — PROGRESS NOTES
GYN ONC PROGRESS NOTE  07/30/2024    HD#2 new neurologic symptoms (confusion, dysarthria/dyskinesia)  Disease: Serous uterine carcinoma s/p XL, SNEHA, BSO, EDIE and cystotomy repair with suprapubic bladder insertion and omental flap on 7/12/24. TINY drain in place. Hx REMA-III s/p laser tx. Remote hx cervical cancer s/p radiation.    24 hour events:   - presented to ED from TCU w/ neuro symptoms  - Neurology evaluation in ED, admission to Gyn Onc  - some hypoTN ON, NS bolus, atenolol held    SUBJECTIVE:   Patient is sleeping at the time of pre-rounds, wakes to voice/touch. Denies pain, nausea, shortness of breath, or other concerns. Declined morning Tylenol dose.    OBJECTIVE:   Patient Vitals for the past 24 hrs:   BP Temp Temp src Pulse Resp SpO2   07/30/24 0552 -- 97.3  F (36.3  C) Oral -- -- --   07/30/24 0546 92/51 (!) 96.4  F (35.8  C) Axillary 54 12 100 %   07/30/24 0330 98/83 -- -- 52 18 100 %   07/29/24 2310 92/68 97.2  F (36.2  C) Axillary 53 12 100 %   07/29/24 1954 98/83 -- -- 54 12 100 %   07/29/24 1700 102/76 97.6  F (36.4  C) Oral (!) 48 18 96 %   07/29/24 1640 -- -- -- (!) 49 -- --   07/29/24 1635 -- -- -- (!) 47 -- --   07/29/24 1620 -- -- -- (!) 49 -- --   07/29/24 1615 (!) 141/113 97.6  F (36.4  C) Oral (!) 49 18 100 %     Constitutional: chronically ill-appearing female in no acute distress, laying on gurney  Cardiovascular: Bradycardia with regular rhythm; no murmurs, clicks, gallops or rub appreciated  Respiratory: Clear to auscultation bilaterally without crackles or wheeze across upper and lower lung fields  Gastrointestinal: Abdomen soft, non-tender. Suprapubic catheter and TINY drain sites are C/D/I. Drain sponge placed loosely over TINY site to protect from nearby EKG/tele lead. Moderate amount of serous drainage present in TINY drain bulb.  : Clear yellow urine present in both urinary kent and suprapubic catheter drainage bags.  Neuro: A&O to person, place only. Speech if muffled/dysarthric.  Intermittently tremulous versus jerking motions of bilateral distal upper extremities.  Extremities: BLE covered, without significant appreciable edema.     New Labs/Imaging-   Results for orders placed or performed during the hospital encounter of 07/29/24 (from the past 24 hour(s))   [place] Draw    Narrative    The following orders were created for panel order [place] Draw.  Procedure                               Abnormality         Status                     ---------                               -----------         ------                     Extra Blue Top Tube[[id number]]                              Final result               Extra Red Top Tube[[id number]]                               Final result               Extra Green Top (Lithium...[[id number]]                      Final result               Extra Purple Top Tube[[id number]]                            Final result               Extra Green Top (Lithium...[[id number]]                      Final result                 Please view results for these tests on the individual orders.   Extra Blue Top Tube   Result Value Ref Range    Hold Specimen JIC    Extra Red Top Tube   Result Value Ref Range    Hold Specimen JIC    Extra Green Top (Lithium Heparin) Tube   Result Value Ref Range    Hold Specimen JIC    Extra Purple Top Tube   Result Value Ref Range    Hold Specimen JIC    Extra Green Top (Lithium Heparin) ON ICE   Result Value Ref Range    Hold Specimen JIC    CBC with platelets differential    Narrative    The following orders were created for panel order CBC with platelets differential.  Procedure                               Abnormality         Status                     ---------                               -----------         ------                     CBC with platelets and d...[[id number]]  Abnormal            Final result                 Please view results for these tests on the individual orders.   Comprehensive metabolic panel   Result Value Ref  Range    Sodium 138 135 - 145 mmol/L    Potassium 4.6 3.4 - 5.3 mmol/L    Carbon Dioxide (CO2) 25 22 - 29 mmol/L    Anion Gap 7 7 - 15 mmol/L    Urea Nitrogen 11.6 8.0 - 23.0 mg/dL    Creatinine 1.26 (H) 0.51 - 0.95 mg/dL    GFR Estimate 45 (L) >60 mL/min/1.73m2    Calcium 8.4 (L) 8.8 - 10.4 mg/dL    Chloride 106 98 - 107 mmol/L    Glucose 94 70 - 99 mg/dL    Alkaline Phosphatase 66 40 - 150 U/L    AST 16 0 - 45 U/L    ALT <5 0 - 50 U/L    Protein Total 5.8 (L) 6.4 - 8.3 g/dL    Albumin 2.5 (L) 3.5 - 5.2 g/dL    Bilirubin Total 0.2 <=1.2 mg/dL   Lipase   Result Value Ref Range    Lipase 48 13 - 60 U/L   Magnesium   Result Value Ref Range    Magnesium 1.9 1.7 - 2.3 mg/dL   Troponin T, High Sensitivity   Result Value Ref Range    Troponin T, High Sensitivity 21 (H) <=14 ng/L   CBC with platelets and differential   Result Value Ref Range    WBC Count 7.6 4.0 - 11.0 10e3/uL    RBC Count 3.36 (L) 3.80 - 5.20 10e6/uL    Hemoglobin 8.1 (L) 11.7 - 15.7 g/dL    Hematocrit 26.2 (L) 35.0 - 47.0 %    MCV 78 78 - 100 fL    MCH 24.1 (L) 26.5 - 33.0 pg    MCHC 30.9 (L) 31.5 - 36.5 g/dL    RDW 25.6 (H) 10.0 - 15.0 %    Platelet Count 352 150 - 450 10e3/uL    % Neutrophils 66 %    % Lymphocytes 22 %    % Monocytes 9 %    % Eosinophils 1 %    % Basophils 1 %    % Immature Granulocytes 1 %    NRBCs per 100 WBC 0 <1 /100    Absolute Neutrophils 5.1 1.6 - 8.3 10e3/uL    Absolute Lymphocytes 1.6 0.8 - 5.3 10e3/uL    Absolute Monocytes 0.7 0.0 - 1.3 10e3/uL    Absolute Eosinophils 0.1 0.0 - 0.7 10e3/uL    Absolute Basophils 0.1 0.0 - 0.2 10e3/uL    Absolute Immature Granulocytes 0.1 <=0.4 10e3/uL    Absolute NRBCs 0.0 10e3/uL   EKG 12-lead, tracing only   Result Value Ref Range    Systolic Blood Pressure  mmHg    Diastolic Blood Pressure  mmHg    Ventricular Rate 47 BPM    Atrial Rate 47 BPM    MS Interval 192 ms    QRS Duration 72 ms     ms    QTc 417 ms    P Axis 6 degrees    R AXIS -10 degrees    T Axis -2 degrees     Interpretation ECG       Sinus bradycardia  Minimal voltage criteria for LVH, may be normal variant  Anterolateral infarct , age undetermined  Abnormal ECG     Blood gas venous   Result Value Ref Range    pH Venous 7.34 7.32 - 7.43    pCO2 Venous 53 (H) 40 - 50 mm Hg    pO2 Venous 19 (L) 25 - 47 mm Hg    Bicarbonate Venous 28 21 - 28 mmol/L    Base Excess/Deficit Venous 2.0 -3.0 - 3.0 mmol/L    FIO2 38     Oxyhemoglobin Venous 19 (L) 70 - 75 %    O2 Sat, Venous 19.4 (L) 70.0 - 75.0 %    Narrative    In healthy individuals, oxyhemoglobin (O2Hb) and oxygen saturation (SO2) are approximately equal. In the presence of dyshemoglobins, oxyhemoglobin can be considerably lower than oxygen saturation.   Asymptomatic Influenza A/B, RSV, & SARS-CoV2 PCR (COVID-19) Nasopharyngeal    Specimen: Nasopharyngeal; Swab   Result Value Ref Range    Influenza A PCR Negative Negative    Influenza B PCR Negative Negative    RSV PCR Negative Negative    SARS CoV2 PCR Negative Negative    Narrative    Testing was performed using the Xpert Xpress CoV2/Flu/RSV Assay on the Cepheid GeneXpert Instrument. This test should be ordered for the detection of SARS-CoV2, influenza, and RSV viruses in individuals with signs and symptoms of respiratory tract infection. This test is for in vitro diagnostic use under the [place] FDA for laboratories certified under CLIA to perform high or moderate complexity testing. This test has been [place] FDA cleared. A negative result does not rule out the presence of PCR inhibitors in the specimen or target RNA in concentration below the limit of detection for the assay. If only one viral target is positive but coinfection with multiple targets is suspected, the sample should be re-tested with another FDA cleared, approved, or authorized test, if coninfection would change clinical management. This test was validated by the [facility] Quotations Book. These laboratories are certified under the Clinical Laboratory  Improvement Amendments of 1988 (CLIA-88) as qualified to perfom high complexity laboratory testing.   UA with Microscopic reflex to Culture    Specimen: Urine, Shahid Catheter   Result Value Ref Range    Color Urine Yellow Colorless, Straw, Light Yellow, Yellow    Appearance Urine Slightly Cloudy (A) Clear    Glucose Urine Negative Negative mg/dL    Bilirubin Urine Negative Negative    Ketones Urine Negative Negative mg/dL    Specific Gravity Urine 1.011 1.003 - 1.035    Blood Urine Small (A) Negative    pH Urine 7.5 (H) 5.0 - 7.0    Protein Albumin Urine 50 (A) Negative mg/dL    Urobilinogen Urine Normal Normal, 2.0 mg/dL    Nitrite Urine Negative Negative    Leukocyte Esterase Urine Large (A) Negative    WBC Clumps Urine Present (A) None Seen /HPF    Mucus Urine Present (A) None Seen /LPF    RBC Urine 12 (H) <=2 /HPF    WBC Urine 78 (H) <=5 /HPF    Squamous Epithelials Urine 2 (H) <=1 /HPF    Transitional Epithelials Urine <1 <=1 /HPF    Hyaline Casts Urine 2 <=2 /LPF    Granular Casts Urine 1 (H) None Seen /LPF    Narrative    Urine Culture ordered based on laboratory criteria   Lactic acid whole blood with 1x repeat in 2 hr when >2   Result Value Ref Range    Lactic Acid, Initial 1.0 0.7 - 2.0 mmol/L   Troponin T, High Sensitivity   Result Value Ref Range    Troponin T, High Sensitivity 18 (H) <=14 ng/L   CT Head w/o Contrast    Narrative    EXAM: CT HEAD W/O CONTRAST  LOCATION: [facility]  DATE: 7/29/2024    INDICATION: AMS/confusion  COMPARISON: None.  TECHNIQUE: Routine CT Head without IV contrast. Multiplanar reformats. Dose reduction techniques were used.    FINDINGS:  INTRACRANIAL CONTENTS: No intracranial hemorrhage, extraaxial collection, or mass effect.  No CT evidence of acute infarct. Normal parenchymal attenuation. Normal ventricles and sulci.     VISUALIZED ORBITS/SINUSES/MASTOIDS: No intraorbital abnormality. No paranasal sinus mucosal disease. No middle  "ear or mastoid effusion.    BONES/SOFT TISSUES: No acute abnormality. Calvarium demonstrates scattered small lucencies non specific likely incidental hemangiomas in the absence of a known malignancy.      Impression    IMPRESSION:  1.  No acute intracranial process.       Pending cultures (date obtained):   - Blood culture (7/29): in process  - Urine culture (7/29): in process        ASSESSMENT:   69 year old female with serous uterine carcinoma s/p recent XL, SNEHA, BSO, EDIE with Dr. Perez c/b cystotomy on 7/12/2024 with suprapubic and urethral catheters in place. Also underwent laser treatment for REMA-III in 2021 and remote pelvic radiation for cervical cancer. Readmitted to Gynecologic Oncology service 7/21/2024 for management of SBO and UTI, discharged to TCU 7/26/2024. Now re-presenting to ED for evaluation of new/progressive neurological symptoms including confusion and dysarthria over the past two days. Also new dyskinetic movements appreciated on Gynecologic Oncology team evaluation that are new since recent admissions. Per Neurology consultation, \"Overall impression at this time is that this patient is experiencing medication toxicity in the setting of multiple CNS medications,\" exacerbated by worsening renal function.     # Confusion  # Dysarthria  # Dyskinesia  - Appreciate Neurology recommendations  - Holding medications that could be contributing to dystonic reaction, including: Seroquel, Tizanidine, Robaxin, Ambien, Cogentin, oxybutynin, oxycodone, gabapentin  - Scheduled melatonin at bedtime per Neurology, delirium precautions     # Schizophrenia  # Depression  - PTA Zoloft continued  - PTA Seroquel, cogentin, vistaril, and Ambien; appreciate Neurology recommendations regarding discontinuation of these agents with new neurological symptoms (see above)  - Unclear outpatient compliance, recent TCU administration of above agents; working to clarify (see separate progress note)  - Consider inpatient " Psychiatry consultation for any indicated long-term medication regimen adjustments     # Intermittent hypotension  # Supplemental O2 requirement  - NS bolus overnight (total of 1L since presentation to ED)  - Although no clear localizing chest symptoms, consider chest imaging to evaluate for pneumonia if vital sign changes are persistent    # Recent UTI  # Recent superficial SSI  - s/p Zosyn x1 in ED 7/29  - complete Bactrim/Flagyl course through 7/31  - f/up repeat urine, blood cultures collected in ED     # Recent intraoperative cystotomy  # Suprapubic catheter, urethral kent catheter in place  - Outpatient visit with Urology scheduled 8/7/2024; contact if still inpatient at that time  - Recent ~14d course of oxybutynin, held/will not resume given neurologic symptoms  - Pyridium prn available     # Cr elevation  # CKD  - Continue to trend serum Cr, UOP  - If persistent c/f MADHU, further laboratory/imaging workup as indicated  - Consider Urology and/or Nephrology involvement pending clinical course     # Perianal skin breakdown  - WOC consultation     # Chronic pain  # Osteoarthritis  - Scheduled tylenol  - PTA gabapentin held given neurologic symptoms  - lidocaine patches prn     # Hypertension  - PTA lasix held given Cr elevation      # Atrial fibrillation  - Patient currently in sinus bradycardia  - Continue PTA atenolol     # History of gastric bypass  - Avoid NSAIDs     # Constipation  - Scheduled Miralax     # Serous uterine carcinoma, s/p SNEHA/BSO  # History of REMA-III, s/p laser therapy  # History of cevical cancer, s/p radiation therapy  - Plan to follow-up outpatient in clinic with primary oncologist Dr. Perez for long term treatment planning     # Inpatient status  - PPX: PTA abixaban (on 28d post-op course), SCDs, IS; consider PPI if prolonged admission    Anali Rodríguez MD, PGY-4  6:15 AM  [facility] Obstetrics & Gynecology Residency    Gyn Onc Pager: [phone]       The patient was seen and examined  with the resident, the labs and vital signs were reviewed. The medical care plan outlined above was provided under my directives and direct supervision. Improving neuro function, more alert today. Will hold on medications until further conversation with Neurology and will reach our to her primary psychiatrist about plan to resume critical psych meds.     Yeimy Muniz MD, MPH  Gynecologic Oncology

## 2024-07-30 NOTE — CONSULTS
[facility] History and Physical    Alis Hartman MRN# [id number]   Age: 69 year old YOB: 1955     Date of Admission:  7/29/2024    Primary care provider: Maria Fernanda Eckert             Chief Complaint:   New neurological symptoms         History of Present Illness:   Aranza is a 69 year old 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 Perez on 7/12/24. Her operation was complicated by a cystotomy which was repaired at the time of her operation with subsequent insertion of a supra-pubic catheter (which remains in place). She was recently readmitted 7/21-26/2024 with concern for a small bowel obstruction that resolved with conservative management. During that readmission, she also underwent treatment of a UTI and management of perianal skin breakdown.    Her past medical history is additionally notable for a remote history of cervical cancer s/p radiation therapy (1996) with subsequent radiation fibrosis, REMA-III (last treated with laser in 6/2021), as well as schizophrenia, hypertension, single episode of atrial fibrillation with spontaneous resolution (on anticoagulation), history of gastric bypass, chronic kidney disease, chronic pain, and osteoarthritis.    Aranza was discharged to a TCU, where she reportedly developed confusion and difficulty speaking over the past couple of day. Per review of TCU physician notes, her family expressed concern that this change was related to Seroquel administration. Per her family members, the patient usually only takes this medication as-needed, a couple of times per week, however was receiving it nightly in the hospital and at the TCU. Per recent psychiatry notes, would have been taking this medication twice daily. Patient was sent from TCU to ED for further evaluation of these neurological changes.    On arrival to the ED, CT head  without contrast was normal/negative. Workup including CBC, lactate, and UA are overall not concerning for acute/worsening infection but Zosyn x1 was given in the ED. The patient was found to be overall vitally stable without evidence of sepsis (did have bradycardia in the setting of known beta blocker therapy and mild hypotension treated with IV fluids). Labs further notable for mild Cr elevation in the setting of known CKD.    On Gynecologic Oncology team evaluation in the ED, history taking was very limited by patient's muffled/dysarthric speech (often had to prompt the patient to speak up/repeat herself, which is a change from her baseline). Regarding concern for confusion, she correctly stated her name and confirmed that she is currently at a hospital. However, she could not correctly state the year, and she thought that she had recently been staying at her daughter's house rather than a TCU. She points to the site of her suprapubic catheter when asked about pain and further states that her bottom hurts. Denies nausea and says that she is feeling hungry right now. Patient is making a combination of tremulous and jerking/repetitive motions with her hands throughout our conversation. This, as well as expressive speech difficulties, constitutes a change from her baseline.    In light of new neurological concerns, Gynecologic Oncology team requests urgent Neurology evaluation prior to further consideration of admission to our service. Their assessment was consistent with a dystonic reaction potentially due to multiple medications, which they recommended holding overnight.         Cancer Treatment History:     3/1996 Initial Diagnosis     Cervical cancer     Moderately differentiated squamous cell carcinoma. She was treated with primary radiation therapy, unsure if she also had chemotherapy or not.  This treatment was at [facility].      12/22/2014 Procedure     Exam under anesthesia with LASER to the vagina for VAIN 3       5/24/2019 Procedure     12/27/18:  Pap: HSIL, HPV+  5/24/19: PROCEDURES: Vaginal colposcopy, vaginal biopsy, CO2 laser of the vagina  VAGINAL BIOPSY:   - High grade squamous intraepithelial lesion (vaginal intraepithelial neoplasia 3)       3/12/2020 Procedure     10/14/19: Pap HSIL/other HPV+  2/28/2020: biopsy of vagina Vain III; MRI recommended prior to OR; however, patient did not complete this  3/12/2020: EUA, biopsies, LASER: VAIN III      6/8/2021 Procedure     6/8/2021: Exam under anesthesia, vaginal biopsies, laser ablation of the upper vagina with Dr. Perez, biopsies returned showing necrosis, no dysplasia. Hyperbaric oxygen therapy discussed and referral place. Patient was unable to pursue this due to need for transportation to the [facility].      4/6/2023 Imaging     4/6/2023 CT Urogram: IMPRESSION:  1.  Negative CT urogram. No urinary calculi, solid renal mass, or evidence of upper tract urothelial malignancy.   2.  Enlarged uterus, with marked distention of the endometrial canal by intermediate density material, possibly a mix of fluid and blood products. This may be related to cervical stenosis given the history of prior pelvic radiation. Uterine enlargement may contribute to urinary frequency/urgency.   3.  Indeterminate left pelvic/perirectal mass with rim calcifications measuring up to 4.5 cm. Differentials include an old hematoma or an atypical enlarged lymph node. Consider pelvic MRI with contrast and subtraction imaging for further evaluation. The uterus and endometrium can also be further evaluated at that time.         Pelvic MRI recommended (4/6/2023), however, patient lost to follow-up     Outpatient visit (4/23/2024): Urinary incontinence likely worsened due to pressure from uterine process on the bladder. There is a possibility of vesicovaginal fistula, however, on exam she has constant leakage from the urethra with concern for intrinsic urethral sphincter insufficiency likely  secondary to radiation therapy.      Pelvic Ultrasound (4/23/2024): The uterus measures 14.4 x 7.8 x 11.8 cm and contains both cystic and solid components.      CT Chest/Abdomen/Pelvis (5/3/2024): Enlarged uterus with increased size of the intermediate  density distended endometrial canal measuring 9.0 x 8.5 x 14.8 cm. New moderate bilateral hydroureteronephrosis, which may be secondary to the mass effect of the adjacent enlarged uterus.      Procedure (5/17/2024) Dilation and curettage of the uterus with drainage of uterine fluid under ultrasound guidance, lysis of vaginal adhesions, cystoscopy                   Final Pathology: Endometrial serous carcinoma with extensive necrosis      Procedure (7/12/2024) Diagnostic laparoscopy converted to exploratory laparotomy, total abdominal hysterectomy, bilateral salpingo-oophorectomy, lysis of adhesions >60 minutes, repair of bladder, insertion of supra-pubic catheter                    Final Pathology:                               A. Pelvic mass, excision:                                  - Hyalinized and calcified fat necrosis                                  - Negative for malignancy                                  B. Uterus, cervix, left fallopian tube and ovary, hysterectomy with left                                     salpingo-oophorectomy:                                   - Endometrial serous carcinoma, MMR-intact/m68-spbixbfe/Her2-negative                                   - Uterine serosal fibrous adhesions                                   - Left chronic salpingitis                                   - Ovary with inclusion cysts, negative for malignancy                                  C. Cervical Remnant:                                   - Necrotic cervical tissue, positive for serous carcinoma                                  D. Omentum, biopsy:                                   - Fibroadipose tissue with no significant histologic abnormality      Postoperative Readmission (7/21-26/2024): SBO, UTI, perianal skin breakdown         Past Medical History:     Past Medical History:   Diagnosis Date    Atrial fibrillation (H)     Depression     Hypertension     Malignant neoplasm of endocervix (H)     Tx with radiation    Other chronic pain     Low back    Schizophrenia (H)     Urinary incontinence             Past Surgical History:      Past Surgical History:   Procedure Laterality Date    ABDOMINOPLASTY      BIOPSY CERVICAL, LOCAL EXCISION, SINGLE/MULTIPLE  10/26/2011    Procedure:BIOPSY CERVICAL, LOCAL EXCISION, SINGLE/MULTIPLE; EUA, cervical biopsies; Surgeon:BETTY TINEO; Location:[facility] OR    BIOPSY VAGINAL N/A 6/8/2021    Procedure: BIOPSY, VAGINA;  Surgeon: Dany Perez MD;  Location: [facility] OR    CYSTOSCOPY N/A 5/17/2024    Procedure: Cystoscopy;  Surgeon: Dany Perez MD;  Location: [facility]    CYSTOSTOMY, INSERT TUBE SUPRAPUBIC, COMBINED  7/12/2024    Procedure: Insertion of supra-pubic catheter;  Surgeon: Dany Perez MD;  [facility]    DILATION AND CURETTAGE, WITH ULTRASOUND GUIDANCE N/A 5/17/2024    Procedure: Dilation and curettage of the uterus with drainage of uterine fluid under ultrasound guidance, lysis of vaginal adhesions;  Surgeon: Dany Perez MD;  Location: [facility]    EXAM UNDER ANESTHESIA PELVIC N/A 3/12/2020    Procedure: Exam under anesthsia, vaginal biopsies, possible CO2 laser of the vagina;  Surgeon: Lilliam Roy MD;  [facility]: [facility]    GI SURGERY  2004    gastric bypass    HYSTERECTOMY TOTAL ABDOMINAL, BILATERAL SALPINGO-OOPHORECTOMY, COMBINED Bilateral 7/12/2024    Procedure: Diagnostic laparoscopy converted to exploratory laparotomy, total abdominal hysterectomy, bilateral salpingo-oophorectomy, lysis of adhesions >60 min;  Surgeon: Dany Perez MD;  [facility]    LASER CO2 VAGINA N/A 3/2/2015    Procedure: LASER CO2 VAGINA;  Surgeon: Mariela Abdalla MD;  Location: [facility] OR    LASER CO2 VAGINA  N/A 5/24/2019    Procedure: Exam under anesthesia, vaginal biopsies, CO2 laser of the vagina;  Surgeon: Lilliam Roy MD;  Location: [facility] OR    LASER CO2 VAGINA N/A 6/8/2021    Procedure: Exam under anesthesia, laser ablation of the upper vagina;  Surgeon: Dany Perez MD;  Location: [facility] OR    REPAIR BLADDER N/A 7/12/2024    Procedure: Repair bladder;  Surgeon: Dany Perez MD;  Location: [facility]            Social History:     Social History     Tobacco Use    Smoking status: Never    Smokeless tobacco: Never   Substance Use Topics    Alcohol use: Not Currently            Family History:     Family History   Problem Relation Age of Onset    Heart Disease Father     Heart Failure Father     No Known Problems Brother     No Known Problems Brother     No Known Problems Brother     Pancreatitis Brother     Hypertension Sister     Peripheral Vascular Disease Sister     No Known Problems Sister     No Known Problems Son     No Known Problems Daughter             Allergies:     Allergies   Allergen Reactions    Ibuprofen Nausea and Vomiting    Shrimp     Sulfa Antibiotics Rash     Patient started on bactrim 7/24/24 tolerating medication without rash on 7/25             Medications:     Current Facility-Administered Medications   Medication Dose Route Frequency Provider Last Rate Last Admin    pharmacy alert - intermittent dosing  1 each Other See Admin Instructions Gustavo Corrales MD        piperacillin-tazobactam (ZOSYN) 2.25 g vial to attach to  ml bag  2.25 g Intravenous Once Padmini Fuentes PA-C        sodium chloride 0.9% BOLUS 500 mL  500 mL Intravenous Once Gustavo Corrales  mL/hr at 07/29/24 2243 500 mL at 07/29/24 2243     Current Outpatient Medications   Medication Sig Dispense Refill    acetaminophen (TYLENOL) 325 MG tablet Take 2 tablets (650 mg) by mouth every 6 hours as needed for mild pain 24 tablet 0    albuterol (PROAIR HFA/PROVENTIL HFA/VENTOLIN HFA) 108 (90 Base) MCG/ACT  inhaler Inhale 1-2 puffs into the lungs every 4 hours as needed for shortness of breath      apixaban ANTICOAGULANT (ELIQUIS) 2.5 MG tablet Take 1 tablet (2.5 mg) by mouth 2 times daily 20 tablet 0    atenolol (TENORMIN) 100 MG tablet TAKE 1 TABLET(100 MG) BY MOUTH DAILY FOR HIGH BLOOD PRESSURE      benztropine (COGENTIN) 1 MG tablet Take 1 tablet by mouth daily      calcium Citrate-vitamin D 500-400 MG-UNIT CHEW Take 1 tablet by mouth daily      childrens multivitamin w/iron (FLINTSTONES COMPLETE) 60 MG chewable tablet Take 2 tablets by mouth daily      cholecalciferol 125 MCG (5000 UT) CAPS Take 5000 mg 4 times a week      cyanocobalamin (CYANOCOBALAMIN) 1000 MCG/ML injection Inject 1 mL (1,000 mcg) into a muscle every month.      diclofenac (VOLTAREN) 1 % topical gel Apply to: Skin on  Both/All Knee for pain. Topical 1% gel, 4 g applied TOPICALLY to lower extremities 3 times daily PRN ;      ferrous sulfate (CASSANDRA-IN-SOL) 75 (15 FE) MG/ML oral drops Take 15 mg by mouth daily      furosemide (LASIX) 40 MG tablet Take 40 mg by mouth daily      gabapentin (NEURONTIN) 800 MG tablet Take 2 tablets by mouth 2 times daily      hydrocortisone 2.5 % cream Apply topically as needed      lidocaine (XYLOCAINE) 5 % external ointment Apply 1 Application topically as needed      methocarbamol (ROBAXIN) 500 MG tablet Take 1 tablet (500 mg) by mouth every 6 hours as needed for muscle spasms or other (abdominal pain/soreness)      metroNIDAZOLE (FLAGYL) 500 MG tablet Take 1 tablet (500 mg) by mouth 3 times daily for 6 days      naloxone (NARCAN) 4 MG/0.1ML nasal spray Spray 1 spray (4 mg) into one nostril alternating nostrils as needed for opioid reversal every 2-3 minutes until assistance arrives 2 each 0    oxyBUTYnin (DITROPAN) 5 MG tablet Take 1 tablet (5 mg) by mouth 3 times daily for 13 days 39 tablet 0    oxyCODONE (ROXICODONE) 5 MG tablet Take 1 tablet (5 mg) by mouth every 4 hours as needed for breakthrough pain (pain  control or improvement in physical function. Hold dose for analgesic side effects.) 18 tablet 0    phenazopyridine (PYRIDIUM) 100 MG tablet Take 1 tablet (100 mg) by mouth 3 times daily as needed for urinary tract discomfort 90 tablet 3    polyethylene glycol (MIRALAX) 17 GM/Dose powder Take 17 g by mouth 2 times daily as needed for constipation      QUEtiapine (SEROQUEL) 400 MG tablet Take 400 mg by mouth at bedtime      senna-docusate (SENOKOT-S/PERICOLACE) 8.6-50 MG tablet Take 1 tablet by mouth 2 times daily 28 tablet 0    sertraline (ZOLOFT) 100 MG tablet Take 1.5 tablets by mouth daily      sulfamethoxazole-trimethoprim (BACTRIM DS) 800-160 MG tablet Take 1 tablet by mouth 2 times daily      tiZANidine (ZANAFLEX) 4 MG tablet Take 4 mg by mouth 3 times daily as needed      zolpidem (AMBIEN) 10 MG tablet Take 10 mg by mouth nightly as needed              Physical Exam:   Patient Vitals for the past 24 hrs:   BP Temp Temp src Pulse Resp SpO2   07/29/24 2310 92/68 97.2  F (36.2  C) Axillary 53 12 100 %   07/29/24 1954 98/83 -- -- 54 12 100 %   07/29/24 1700 102/76 97.6  F (36.4  C) Oral (!) 48 18 96 %   07/29/24 1640 -- -- -- (!) 49 -- --   07/29/24 1635 -- -- -- (!) 47 -- --   07/29/24 1620 -- -- -- (!) 49 -- --   07/29/24 1615 (!) 141/113 97.6  F (36.4  C) Oral (!) 49 18 100 %     Constitutional: chronically ill-appearing female in no acute distress, laying on gurney  Cardiovascular: Bradycardia with regular rhythm; no murmurs, clicks, gallops or rub appreciated  Respiratory: Clear to auscultation bilaterally without crackles or wheeze  Gastrointestinal: Abdomen soft, non-tender. Suprapubic catheter and TINY drain sites are C/D/I.  : Clear yellow urine present in both urinary kent and suprapubic catheter drainage bags.  Neuro: A&O to person, place only. Speech if muffled/dysarthric. Intermittently tremulous versus jerking motions of bilateral distal upper extremities.  Extremities: BLE wrapped, without  significant appreciable edema.          Data:     Results for orders placed or performed during the hospital encounter of 07/29/24 (from the past 24 hour(s))   [place] Draw    Narrative    The following orders were created for panel order [place] Draw.  Procedure                               Abnormality         Status                     ---------                               -----------         ------                     Extra Blue Top Tube[[id number]]                              Final result               Extra Red Top Tube[[id number]]                               Final result               Extra Green Top (Lithium...[[id number]]                      Final result               Extra Purple Top Tube[[id number]]                            Final result               Extra Green Top (Lithium...[[id number]]                      Final result                 Please view results for these tests on the individual orders.   Extra Blue Top Tube   Result Value Ref Range    Hold Specimen JIC    Extra Red Top Tube   Result Value Ref Range    Hold Specimen JIC    Extra Green Top (Lithium Heparin) Tube   Result Value Ref Range    Hold Specimen JIC    Extra Purple Top Tube   Result Value Ref Range    Hold Specimen JIC    Extra Green Top (Lithium Heparin) ON ICE   Result Value Ref Range    Hold Specimen JIC    CBC with platelets differential    Narrative    The following orders were created for panel order CBC with platelets differential.  Procedure                               Abnormality         Status                     ---------                               -----------         ------                     CBC with platelets and d...[[id number]]  Abnormal            Final result                 Please view results for these tests on the individual orders.   Comprehensive metabolic panel   Result Value Ref Range    Sodium 138 135 - 145 mmol/L    Potassium 4.6 3.4 - 5.3 mmol/L    Carbon Dioxide (CO2) 25 22 - 29 mmol/L    Anion Gap  7 7 - 15 mmol/L    Urea Nitrogen 11.6 8.0 - 23.0 mg/dL    Creatinine 1.26 (H) 0.51 - 0.95 mg/dL    GFR Estimate 45 (L) >60 mL/min/1.73m2    Calcium 8.4 (L) 8.8 - 10.4 mg/dL    Chloride 106 98 - 107 mmol/L    Glucose 94 70 - 99 mg/dL    Alkaline Phosphatase 66 40 - 150 U/L    AST 16 0 - 45 U/L    ALT <5 0 - 50 U/L    Protein Total 5.8 (L) 6.4 - 8.3 g/dL    Albumin 2.5 (L) 3.5 - 5.2 g/dL    Bilirubin Total 0.2 <=1.2 mg/dL   Lipase   Result Value Ref Range    Lipase 48 13 - 60 U/L   Magnesium   Result Value Ref Range    Magnesium 1.9 1.7 - 2.3 mg/dL   Troponin T, High Sensitivity   Result Value Ref Range    Troponin T, High Sensitivity 21 (H) <=14 ng/L   CBC with platelets and differential   Result Value Ref Range    WBC Count 7.6 4.0 - 11.0 10e3/uL    RBC Count 3.36 (L) 3.80 - 5.20 10e6/uL    Hemoglobin 8.1 (L) 11.7 - 15.7 g/dL    Hematocrit 26.2 (L) 35.0 - 47.0 %    MCV 78 78 - 100 fL    MCH 24.1 (L) 26.5 - 33.0 pg    MCHC 30.9 (L) 31.5 - 36.5 g/dL    RDW 25.6 (H) 10.0 - 15.0 %    Platelet Count 352 150 - 450 10e3/uL    % Neutrophils 66 %    % Lymphocytes 22 %    % Monocytes 9 %    % Eosinophils 1 %    % Basophils 1 %    % Immature Granulocytes 1 %    NRBCs per 100 WBC 0 <1 /100    Absolute Neutrophils 5.1 1.6 - 8.3 10e3/uL    Absolute Lymphocytes 1.6 0.8 - 5.3 10e3/uL    Absolute Monocytes 0.7 0.0 - 1.3 10e3/uL    Absolute Eosinophils 0.1 0.0 - 0.7 10e3/uL    Absolute Basophils 0.1 0.0 - 0.2 10e3/uL    Absolute Immature Granulocytes 0.1 <=0.4 10e3/uL    Absolute NRBCs 0.0 10e3/uL   EKG 12-lead, tracing only   Result Value Ref Range    Systolic Blood Pressure  mmHg    Diastolic Blood Pressure  mmHg    Ventricular Rate 47 BPM    Atrial Rate 47 BPM    MS Interval 192 ms    QRS Duration 72 ms     ms    QTc 417 ms    P Axis 6 degrees    R AXIS -10 degrees    T Axis -2 degrees    Interpretation ECG       Sinus bradycardia  Minimal voltage criteria for LVH, may be normal variant  Anterolateral infarct , age  undetermined  Abnormal ECG     Blood gas venous   Result Value Ref Range    pH Venous 7.34 7.32 - 7.43    pCO2 Venous 53 (H) 40 - 50 mm Hg    pO2 Venous 19 (L) 25 - 47 mm Hg    Bicarbonate Venous 28 21 - 28 mmol/L    Base Excess/Deficit Venous 2.0 -3.0 - 3.0 mmol/L    FIO2 38     Oxyhemoglobin Venous 19 (L) 70 - 75 %    O2 Sat, Venous 19.4 (L) 70.0 - 75.0 %    Narrative    In healthy individuals, oxyhemoglobin (O2Hb) and oxygen saturation (SO2) are approximately equal. In the presence of dyshemoglobins, oxyhemoglobin can be considerably lower than oxygen saturation.   Asymptomatic Influenza A/B, RSV, & SARS-CoV2 PCR (COVID-19) Nasopharyngeal    Specimen: Nasopharyngeal; Swab   Result Value Ref Range    Influenza A PCR Negative Negative    Influenza B PCR Negative Negative    RSV PCR Negative Negative    SARS CoV2 PCR Negative Negative    Narrative    Testing was performed using the Xpert Xpress CoV2/Flu/RSV Assay on the [employer] GeneXpert Instrument. This test should be ordered for the detection of SARS-CoV2, influenza, and RSV viruses in individuals with signs and symptoms of respiratory tract infection. This test is for in vitro diagnostic use under the [place] FDA for laboratories certified under CLIA to perform high or moderate complexity testing. This test has been [place] FDA cleared. A negative result does not rule out the presence of PCR inhibitors in the specimen or target RNA in concentration below the limit of detection for the assay. If only one viral target is positive but coinfection with multiple targets is suspected, the sample should be re-tested with another FDA cleared, approved, or authorized test, if coninfection would change clinical management. This test was validated by the [facility]. These laboratories are certified under the Clinical Laboratory Improvement Amendments of 1988 (CLIA-88) as qualified to perfom high complexity laboratory testing.   UA with Microscopic reflex to  Culture    Specimen: Urine, Shahid Catheter   Result Value Ref Range    Color Urine Yellow Colorless, Straw, Light Yellow, Yellow    Appearance Urine Slightly Cloudy (A) Clear    Glucose Urine Negative Negative mg/dL    Bilirubin Urine Negative Negative    Ketones Urine Negative Negative mg/dL    Specific Gravity Urine 1.011 1.003 - 1.035    Blood Urine Small (A) Negative    pH Urine 7.5 (H) 5.0 - 7.0    Protein Albumin Urine 50 (A) Negative mg/dL    Urobilinogen Urine Normal Normal, 2.0 mg/dL    Nitrite Urine Negative Negative    Leukocyte Esterase Urine Large (A) Negative    WBC Clumps Urine Present (A) None Seen /HPF    Mucus Urine Present (A) None Seen /LPF    RBC Urine 12 (H) <=2 /HPF    WBC Urine 78 (H) <=5 /HPF    Squamous Epithelials Urine 2 (H) <=1 /HPF    Transitional Epithelials Urine <1 <=1 /HPF    Hyaline Casts Urine 2 <=2 /LPF    Granular Casts Urine 1 (H) None Seen /LPF    Narrative    Urine Culture ordered based on laboratory criteria   Lactic acid whole blood with 1x repeat in 2 hr when >2   Result Value Ref Range    Lactic Acid, Initial 1.0 0.7 - 2.0 mmol/L   Troponin T, High Sensitivity   Result Value Ref Range    Troponin T, High Sensitivity 18 (H) <=14 ng/L   CT Head w/o Contrast    Narrative    EXAM: CT HEAD W/O CONTRAST  LOCATION: [facility]  DATE: 7/29/2024    INDICATION: AMS/confusion  COMPARISON: None.  TECHNIQUE: Routine CT Head without IV contrast. Multiplanar reformats. Dose reduction techniques were used.    FINDINGS:  INTRACRANIAL CONTENTS: No intracranial hemorrhage, extraaxial collection, or mass effect.  No CT evidence of acute infarct. Normal parenchymal attenuation. Normal ventricles and sulci.     VISUALIZED ORBITS/SINUSES/MASTOIDS: No intraorbital abnormality. No paranasal sinus mucosal disease. No middle ear or mastoid effusion.    BONES/SOFT TISSUES: No acute abnormality. Calvarium demonstrates scattered small lucencies non specific  likely incidental hemangiomas in the absence of a known malignancy.      Impression    IMPRESSION:  1.  No acute intracranial process.             Assessment and Plan:   Assessment: 69 year old female with serous uterine carcinoma s/p recent XL, SNEHA, BSO, EDIE with Dr. Perez c/b cystotomy on 7/12/2024 with suprapubic and urethral catheters in place. Also underwent laser treatment for REMA-III in 2021 and remote pelvic radiation for cervical cancer. Readmitted to Gynecologic Oncology service 7/21/2024 for management of SBO and UTI, discharged to TCU 7/26/2024. Now re-presenting to ED for evaluation of new/progressive neurological symptoms including confusion and dysarthria over the past two days. Also new dyskinetic movements appreciated on Gynecologic Oncology team evaluation that are new since recent admissions. Patient is on a second-generation antipsychotic in the setting of a schizophrenia diagnosis, and exact recent dosing/outpatient compliance with this therapy is unclear. However, administration of this anti-dopaminergic agent in combination with several anti-cholinergics both chronically (Cogentin/benztropine, Vistaril/hydroxyzine) and acutely (Ditropan/oxybutynin course given with recent cystotomy management) raise concern for tardive dyskinesia in this elderly patient. Requested urgent Neurology evaluation in the ED to assess for this versus another acute neurologic condition. Their assessment was consistent with a dystonic reaction potentially due to multiple medications, which they recommended holding overnight.     # Confusion  # Dysarthria  # Dyskinesia  - Appreciate Neurology recommendations  - Holding medications that could be contributing to dystonic reaction, including: Seroquel, Tizanidine, Robaxin, Ambien, Cogentin, oxybutynin, oxycodone, gabapentin     # Schizophrenia  - PTA Seroquel, Zoloft, cogentin, vistaril, and Ambien; appreciate Neurology recommendations regarding discontinuation of these  agents with new neurological symptoms  - Unclear outpatient compliance, recent TCU administration of above agents; working to clarify   - Consider inpatient Psychiatry consultation for any indicated long-term medication regimen adjustments    # Recent UTI  # Recent superficial SSI  - complete Bactrim/Flagyl course through 7/31  - f/up repeat urine, blood cultures collected in ED    # Recent intraoperative cystotomy  # Suprapubic catheter, urethral kent catheter in place  - Outpatient visit with Urology scheduled 8/7/2024; contact if still inpatient at that time  - Recent ~14d course of oxybutynin, held/will not resume given neurologic symptoms  - Pyridium prn available    # Cr elevation  # CKD  - Continue to trend serum Cr, UOP  - If c/f persistent MADHU, further laboratory/imaging workup as indicated  - Consider Urology and/or Nephrology involvement pending clinical course    # Perianal skin breakdown  - WOC consultation     # Chronic pain  # Osteoarthritis  - Scheduled tylenol  - PTA gabapentin held given neurologic symptoms  - lidocaine patches prn     # Hypertension  - PTA lasix held given Cr elevation      # Atrial fibrillation  - Patient currently in sinus bradycardia  - Continue PTA atenolol     # History of gastric bypass  - Avoid NSAIDs     # Constipation  - Scheduled Miralax added    # Serous uterine carcinoma, s/p SNEHA/BSO  # History of REMA-III, s/p laser therapy  # History of cevical cancer, s/p radiation therapy  - Plan to follow-up outpatient in clinic with primary oncologist Dr. Perez for long term treatment planning    # Inpatient status  - PPX: PTA abixaban (on 28d post-op course), SCDs, IS; consider PPI if prolonged admission    Discussed with on-call Gynecologic Oncology fellow Dr. Avina.    Anali Rodríguez MD, PGY-4  12:50 AM  [facility] Obstetrics & Gynecology Residency    The patient was seen and examined with the resident, the labs and vital signs were reviewed. The medical care plan outlined above  was provided under my directives and direct supervision. Admit for evaluation and management of acute neurologic changes and confusion, likely medication related.    Yeimy Muniz MD, MPH  Gynecologic Oncology

## 2024-07-31 ENCOUNTER — APPOINTMENT (OUTPATIENT)
Dept: PHYSICAL THERAPY | Facility: CLINIC | Age: 69
DRG: 091 | End: 2024-07-31
Attending: NURSE PRACTITIONER
Payer: COMMERCIAL

## 2024-07-31 LAB
ALBUMIN SERPL BCG-MCNC: 1.8 G/DL (ref 3.5–5.2)
ALP SERPL-CCNC: 51 U/L (ref 40–150)
ALT SERPL W P-5'-P-CCNC: <5 U/L (ref 0–50)
ANION GAP SERPL CALCULATED.3IONS-SCNC: 7 MMOL/L (ref 7–15)
AST SERPL W P-5'-P-CCNC: 13 U/L (ref 0–45)
BILIRUB SERPL-MCNC: <0.2 MG/DL
BLD PROD TYP BPU: NORMAL
BLOOD COMPONENT TYPE: NORMAL
BUN SERPL-MCNC: 10.4 MG/DL (ref 8–23)
CALCIUM SERPL-MCNC: 7.7 MG/DL (ref 8.8–10.4)
CHLORIDE SERPL-SCNC: 115 MMOL/L (ref 98–107)
CODING SYSTEM: NORMAL
CREAT SERPL-MCNC: 1.2 MG/DL (ref 0.51–0.95)
CROSSMATCH: NORMAL
EGFRCR SERPLBLD CKD-EPI 2021: 48 ML/MIN/1.73M2
ERYTHROCYTE [DISTWIDTH] IN BLOOD BY AUTOMATED COUNT: 26.3 % (ref 10–15)
GLUCOSE SERPL-MCNC: 75 MG/DL (ref 70–99)
HCO3 SERPL-SCNC: 19 MMOL/L (ref 22–29)
HCT VFR BLD AUTO: 23.4 % (ref 35–47)
HGB BLD-MCNC: 6.6 G/DL (ref 11.7–15.7)
ISSUE DATE AND TIME: NORMAL
MAGNESIUM SERPL-MCNC: 1.7 MG/DL (ref 1.7–2.3)
MCH RBC QN AUTO: 23.5 PG (ref 26.5–33)
MCHC RBC AUTO-ENTMCNC: 28.2 G/DL (ref 31.5–36.5)
MCV RBC AUTO: 83 FL (ref 78–100)
PHOSPHATE SERPL-MCNC: 2.6 MG/DL (ref 2.5–4.5)
PLATELET # BLD AUTO: 232 10E3/UL (ref 150–450)
POTASSIUM SERPL-SCNC: 4.4 MMOL/L (ref 3.4–5.3)
PROT SERPL-MCNC: 4.6 G/DL (ref 6.4–8.3)
RBC # BLD AUTO: 2.81 10E6/UL (ref 3.8–5.2)
SODIUM SERPL-SCNC: 141 MMOL/L (ref 135–145)
UNIT ABO/RH: NORMAL
UNIT NUMBER: NORMAL
UNIT STATUS: NORMAL
UNIT TYPE ISBT: 5100
WBC # BLD AUTO: 5.6 10E3/UL (ref 4–11)

## 2024-07-31 PROCEDURE — P9016 RBC LEUKOCYTES REDUCED: HCPCS

## 2024-07-31 PROCEDURE — 85027 COMPLETE CBC AUTOMATED: CPT

## 2024-07-31 PROCEDURE — 99233 SBSQ HOSP IP/OBS HIGH 50: CPT | Mod: 24 | Performed by: OBSTETRICS & GYNECOLOGY

## 2024-07-31 PROCEDURE — 120N000002 HC R&B MED SURG/OB UMMC

## 2024-07-31 PROCEDURE — 82040 ASSAY OF SERUM ALBUMIN: CPT

## 2024-07-31 PROCEDURE — 999N000111 HC STATISTIC OT IP EVAL DEFER

## 2024-07-31 PROCEDURE — 83735 ASSAY OF MAGNESIUM: CPT

## 2024-07-31 PROCEDURE — 36415 COLL VENOUS BLD VENIPUNCTURE: CPT

## 2024-07-31 PROCEDURE — 97161 PT EVAL LOW COMPLEX 20 MIN: CPT | Mod: GP

## 2024-07-31 PROCEDURE — 250N000013 HC RX MED GY IP 250 OP 250 PS 637

## 2024-07-31 PROCEDURE — 84100 ASSAY OF PHOSPHORUS: CPT

## 2024-07-31 PROCEDURE — 97530 THERAPEUTIC ACTIVITIES: CPT | Mod: GP

## 2024-07-31 RX ORDER — OXYCODONE HYDROCHLORIDE 5 MG/1
5 TABLET ORAL EVERY 4 HOURS PRN
Status: DISCONTINUED | OUTPATIENT
Start: 2024-07-31 | End: 2024-08-09 | Stop reason: HOSPADM

## 2024-07-31 RX ORDER — GABAPENTIN 100 MG/1
100 CAPSULE ORAL 3 TIMES DAILY
Status: DISCONTINUED | OUTPATIENT
Start: 2024-07-31 | End: 2024-08-01

## 2024-07-31 RX ORDER — PANTOPRAZOLE SODIUM 40 MG/1
40 TABLET, DELAYED RELEASE ORAL
Status: DISCONTINUED | OUTPATIENT
Start: 2024-08-01 | End: 2024-08-09 | Stop reason: HOSPADM

## 2024-07-31 RX ORDER — ATENOLOL 25 MG/1
25 TABLET ORAL DAILY
Status: DISCONTINUED | OUTPATIENT
Start: 2024-08-01 | End: 2024-08-09 | Stop reason: HOSPADM

## 2024-07-31 RX ADMIN — METRONIDAZOLE 500 MG: 500 TABLET ORAL at 09:31

## 2024-07-31 RX ADMIN — APIXABAN 2.5 MG: 2.5 TABLET, FILM COATED ORAL at 09:31

## 2024-07-31 RX ADMIN — SULFAMETHOXAZOLE AND TRIMETHOPRIM 1 TABLET: 800; 160 TABLET ORAL at 09:31

## 2024-07-31 RX ADMIN — OXYCODONE HYDROCHLORIDE 5 MG: 5 TABLET ORAL at 10:24

## 2024-07-31 RX ADMIN — POLYETHYLENE GLYCOL 3350 17 G: 17 POWDER, FOR SOLUTION ORAL at 09:32

## 2024-07-31 RX ADMIN — ACETAMINOPHEN 650 MG: 325 TABLET, FILM COATED ORAL at 09:32

## 2024-07-31 RX ADMIN — APIXABAN 2.5 MG: 2.5 TABLET, FILM COATED ORAL at 20:03

## 2024-07-31 RX ADMIN — METRONIDAZOLE 500 MG: 500 TABLET ORAL at 15:09

## 2024-07-31 RX ADMIN — ATENOLOL 100 MG: 25 TABLET ORAL at 09:31

## 2024-07-31 RX ADMIN — SULFAMETHOXAZOLE AND TRIMETHOPRIM 1 TABLET: 800; 160 TABLET ORAL at 20:03

## 2024-07-31 RX ADMIN — ACETAMINOPHEN 650 MG: 325 TABLET, FILM COATED ORAL at 15:09

## 2024-07-31 RX ADMIN — METRONIDAZOLE 500 MG: 500 TABLET ORAL at 20:03

## 2024-07-31 RX ADMIN — Medication 1 MG: at 21:52

## 2024-07-31 ASSESSMENT — ACTIVITIES OF DAILY LIVING (ADL)
ADLS_ACUITY_SCORE: 48
ADLS_ACUITY_SCORE: 48
ADLS_ACUITY_SCORE: 47
ADLS_ACUITY_SCORE: 47
ADLS_ACUITY_SCORE: 48
ADLS_ACUITY_SCORE: 47
ADLS_ACUITY_SCORE: 48
ADLS_ACUITY_SCORE: 45
ADLS_ACUITY_SCORE: 48
ADLS_ACUITY_SCORE: 47
ADLS_ACUITY_SCORE: 48
ADLS_ACUITY_SCORE: 47

## 2024-07-31 NOTE — PROGRESS NOTES
"   07/31/24 0930   Appointment Info   Signing Clinician's Name / Credentials (PT) Gissel Xavier, PT, DPT   Living Environment   People in Home grandchild(vernell);child(vernell), adult   Current Living Arrangements apartment   Home Accessibility stairs to enter home   Number of Stairs, Main Entrance 3   Stair Railings, Main Entrance railings safe and in good condition   Transportation Anticipated agency   Living Environment Comments Per chart review, patient from TCU, but patient denied that and said she was at home with her son and daughter-in-law. She reports having 24/7 support as needed.   Self-Care   Usual Activity Tolerance fair   Current Activity Tolerance poor   Regular Exercise No   Equipment Currently Used at Home cane, straight;walker, standard;shower chair   Fall history within last six months no   Activity/Exercise/Self-Care Comment Patient reports having a PCA \"every day\" for a variety of hours each stay: \"They stay until they're done.\" They help with showering, cooking, cleaning, med set up   General Information   Onset of Illness/Injury or Date of Surgery 07/29/24   Referring Physician Kelsey Mccracken, ERINN CNP   Patient/Family Therapy Goals Statement (PT) to go home.   Pertinent History of Current Problem (include personal factors and/or comorbidities that impact the POC) 69 year old female with serous uterine carcinoma s/p recent SATURNINO, SNEHA, SIENA, EDIE with Dr. Perez c/b cystotomy on 7/12/2024 with suprapubic and urethral catheters in place. Also underwent laser treatment for REMA-III in 2021 and remote pelvic radiation for cervical cancer. Readmitted to Gynecologic Oncology service 7/21/2024 for management of SBO and UTI, discharged to TCU 7/26/2024. Now re-presenting to ED for evaluation of new/progressive neurological symptoms including confusion and dysarthria over the past two days. Also new dyskinetic movements appreciated on Gynecologic Oncology team evaluation that are new since recent " "admissions. Per Neurology consultation, \"Overall impression at this time is that this patient is experiencing medication toxicity in the setting of multiple CNS medications,\" exacerbated by worsening renal function. Symptoms gradually improving with discontinuation of suspected anti-dopaminergic and anti-cholinergic agents; restarting indicated medications under guidance of Neurology and Psychiatry consultants.   Existing Precautions/Restrictions abdominal;fall   Cognition   Affect/Mental Status (Cognition) confused   Cognitive Status Comments Oriented to self but was not able to recall being at TCU before hospital stay.   Pain Assessment   Patient Currently in Pain Yes, see Vital Sign flowsheet   Integumentary/Edema   Integumentary/Edema no deficits were identifed   Posture    Posture Forward head position;Protracted shoulders   Range of Motion (ROM)   Range of Motion ROM is WFL   Strength (Manual Muscle Testing)   Strength Comments Grossly deconditioned; able to lift all four extremities anti-gravity but struggled to lift her legs completely on to the bed without physical assist.   Bed Mobility   Comment, (Bed Mobility) Min A   Transfers   Comment, (Transfers) Min A   Gait/Stairs (Locomotion)   Comment, (Gait/Stairs) Min A; shuffling steps with uneven step/stride length   Balance   Balance Comments Minimal reliance on UE for support.   Clinical Impression   Criteria for Skilled Therapeutic Intervention Yes, treatment indicated   PT Diagnosis (PT) impaired mobility   Influenced by the following impairments weakness   Functional limitations due to impairments bed mobility; transfers and gait; stairs   Clinical Presentation (PT Evaluation Complexity) stable   Clinical Presentation Rationale clinical judgement   Clinical Decision Making (Complexity) low complexity   Planned Therapy Interventions (PT) balance training;bed mobility training;gait training;home exercise program;neuromuscular re-education;stair " training;strengthening;transfer training   Risk & Benefits of therapy have been explained evaluation/treatment results reviewed;care plan/treatment goals reviewed;risks/benefits reviewed   PT Total Evaluation Time   PT Chevy, Low Complexity Minutes (20387) 10   Physical Therapy Goals   PT Frequency 5x/week   PT Predicted Duration/Target Date for Goal Attainment 08/14/24   PT Goals Bed Mobility;Transfers;Gait;Stairs   PT: Bed Mobility Independent   PT: Transfers Independent   PT: Gait Supervision/stand-by assist   PT: Stairs Minimal assist;3 stairs;Rail on both sides   Interventions   Interventions Quick Adds Therapeutic Activity   Therapeutic Activity   Therapeutic Activities: dynamic activities to improve functional performance Minutes (21637) 10   Symptoms Noted During/After Treatment None   Treatment Detail/Skilled Intervention Cues given for log rolling in and out of bed to follow abdominal precautions. Patient seemed to be having a hard time recalling the surgery she had and how she was moving just prior to coming to the hospital. Able to stand at the edge of the bed and take a couple of steps to move up higher before sitting, but needed extensive cues for direction and safety awareness. Low O2 reading but RN in room for poor reading on pulse ox; patient reported no shortness of breath or concerns. Needed min A to lift LEs back in to bed with cues again for log rolling for safety.   PT Discharge Planning   PT Plan bed mobility; transfers and gait as able   PT Discharge Recommendation (DC Rec) Transitional Care Facility   PT Rationale for DC Rec Patient unable to mobilize without physical assist; not safe to discharge home at this time.   PT Brief overview of current status A x 1   Total Session Time   Timed Code Treatment Minutes 10   Total Session Time (sum of timed and untimed services) 20

## 2024-07-31 NOTE — PLAN OF CARE
Goal Outcome Evaluation:      Plan of Care Reviewed With: patient    Overall Patient Progress: no changeOverall Patient Progress: no change     [id number]  Afebrile. VSS on RA. A&O x3, disoriented to time, alert. Denies shortness of breath or chest pain. CMS intact. Suprapubic + Shahid Catheter w/ marginal UOP. MD notified when rounding on pt. LBM 7/30. TINY w/ scant serous output. Pt denies pain/nausea. Poor PO intake. L PIV infusing MIVF @ 100 mL/hr. Abdominal folds w/ interdry in place. Continue POC

## 2024-07-31 NOTE — PLAN OF CARE
"Goal Outcome Evaluation:      Plan of Care Reviewed With: patient    Overall Patient Progress: improvingOverall Patient Progress: improving     BP 96/58 (BP Location: Right arm)   Pulse 54   Temp 97.9  F (36.6  C) (Oral)   Resp 16   SpO2 100%     Neuro: A&Ox3. Arouses to voice.   Cardiac: Afebrile, VSS.   Respiratory: RA   GI/: SPC and kent- minimal output from Kent catheter, adequate UOP from SPC. No BM this shift.   Diet/appetite: Tolerating diet. Denies nausea   Activity: not oob through shift. Assist of 1. Repositioned with assist of 1- turned x2 through shift  Pain: . Denies   Skin: pt declined full skin assessment. Has personal clothing on and did not want to remove them because she stated it was too \"cold\"  warm blankets applied through night.  Lines: piv sl'd  Drains: TINY to bulb suction: dressing CDI. No output through night. SPC; Site CDI- patent to drainage bag. Kent catheter- patent. Declined kent cares (see skin above)  Replacement: no RN managed replacement protocols in place.    Rested btwn cares. Able to make needs known. Continue to monitor. Notify MD of changes/concerns        "

## 2024-07-31 NOTE — PLAN OF CARE
Occupational Therapy: Orders received. Chart reviewed and discussed with Physical Therapy.? Occupational Therapy not indicated due to pt from TCU with plans to return once medically cleared. More appropriate for 1 discipline to initiated and follow while IP which PT will take lead on.? Defer discharge recommendations to PT.? Will complete orders. Please re-consult if status should change.

## 2024-07-31 NOTE — PROGRESS NOTES
Brief Gyn Onc Progress Note    At bedside for evening check-in. Patient is sleeping soundly and was not disturbed. Now weaned off supplemental O2 to room air. Per discussion with RN patient has been overall more interactive today but is still not quite oriented to time. Discussed continuing mIVF for limited PO intake and ongoing low-normal BP.    Anali Rodríguez MD, PGY-4  9:48 PM  [facility] Obstetrics & Gynecology Residency

## 2024-07-31 NOTE — CONSULTS
Care Management Follow Up    Length of Stay (days): 1    Expected Discharge Date: 08/01/2024     Concerns to be Addressed: discharge planning     Patient plan of care discussed at interdisciplinary rounds:  unknown    Anticipated Discharge Disposition: Skilled Nursing Facility, Transitional Care     Anticipated Discharge Services:    Anticipated Discharge DME: None    Patient/family educated on Medicare website which has current facility and service quality ratings: yes  Education Provided on the Discharge Plan: Yes  Patient/Family in Agreement with the Plan: yes    Referrals Placed by CM/SW: Post Acute Facilities-see below  Private pay costs discussed: Not applicable    Additional Information:    1043 BANDAR spoke with Joy     1043 BANDAR called pt's daughter Joy  ([phone]) to discuss TCU choices. BANDAR introduced self and explained the reason for the call. Joy agreed to speak with BANDAR and asked for another copy of the Medicare list. BANDAR offered to provide copy via email. Joy agreed and provided email (Email: [email or web address])     2802 BANDAR emailed the Medicare list and link to Medicare.gov    Per email communication, Joy requested referrals be made to the following facilities    [facility]  [address] [place] [address].  [place] [address]  Ph: [phone]  Adm: [phone]  Fax: [phone]  1529 Initial SNF Referral sent via [employer] to SNF for review.      [facility]  [address] [place] [address]  Ph: [phone]   Adm: [phone]  Fax: [phone]   1522 Initial SNF Referral sent via [employer] to SNF for review.      [facility]  [address] [place] [address]  Ph: [phone]   Adm: [phone]  Fax: [phone] 1528 Initial SNF Referral sent via [employer] to SNF for review.      [facility]  [address] [place]  [address]  Ph: [phone]  Adm: [phone]  Fax: [phone]  1551  Initial SNF Referral sent via Epic to SNF for review.     [facility]  [address] [place] [address]  Ph: [phone]  Adm: [phone]  Fax: [phone]  1557 Initial SNF Referral sent via Epic to SNF for review.     [facility]  [address] [place] [address]  Ph: [phone]   Adm: [phone]  Fax: [phone]  1554 Initial SNF Referral sent via [facility] to SNF for review.       [facility]  [address] [place] [address]  Ph: [phone]  Adm: [phone]  Fax: [phone]   [facility] Global Referral (Liaison Lissa Rojas; P: [phone]; F: [phone]; E: [email or web address])  1552 Initial SNF Referral sent via Epic to [facility] Intake for review.    SW will continue to follow as needed.    WILLIAM Lares, LGSW  ED/OBS   [facility]  Phone: [phone]  Pager: [phone]  Fax: [phone]     After hours [employer] and After Hours [employer] [place]     On-call pager, [phone], 4:00 pm to midnight

## 2024-07-31 NOTE — PROGRESS NOTES
Brief Progress Note    Notified by RN that patient now with 8/10 abdominal pain and requesting pain medication. She has tried Tylenol, but continues to have breakthrough pain. Team attempted to visit patient during rounds, but patient sleeping and no team had no urgent updates so let her continue to sleep. Went  to check in on patient now. Room was dark but Aranza awake laying in bed. Turned lights on in room. Aranza notes pain in her LLQ and around her suprapubic catheter, which she feels is at her baseline. Abdomen is soft, non-distended, and mildly tender to palpation. Discussed with Aranza that we were holding her PTA oxycodone in the setting of new neurologic symptoms and concern for dystonic reaction. Aranza interacting and answering questions appropriately during our conversation, seems to be back at her baseline from her previous admission 7/21-7/26. Will re-start PTA oxycodone now. Also discussed blood transfusion ordered for today in the setting of hemoglobin of 6.6 this morning. Discussed delirium precautions with bedside RN and to keep lights on in patient room during the day.     Krupa Escobar MD  [facility]  Obstetrics, Gynecology, & Women's Health PGY1

## 2024-07-31 NOTE — PLAN OF CARE
Goal Outcome Evaluation:      Plan of Care Reviewed With: patient    Overall Patient Progress: no changeOverall Patient Progress: no change    Temp: 98.6  F (37  C) Temp src: Oral BP: 98/51 Pulse: 51   Resp: 16 SpO2: 100 % O2 Device: None (Room air)    NEURO: Alert, disoriented to time, pleasant/cooperative, able to make needs known  RESPIRATORY: WNL on RA, continuous pulse ox, denies SOB  CARDIAC: Bradycardic not within notifying parameters, hypotensive not within notifying parameters, denies chest pain  GI/: Voiding via suprapubic catheter and kent, no BM this shift, denies nausea, denies abd pain  SKIN: Pt refused assessments of sacral wounds and abdominal wounds this shift, RLQ TINY to bulb suction w/ scant serous drainage   DIET: Regular, needs assistance/reminders w/ ordering meals  PAIN/NAUSEA: 8/10 abdominal pain, adequate relief per pt w/ scheduled tylenol and PRN oxycodone x1 this shift  IV ACCESS: L-PIV SL  ACTIVITY: Assist x1 w/ gait belt & walker  LAB: Critical low Hgb 6.6 this A.M., 1u RBCs given this shift w/ no transfusion reaction, recheck Hgb w/ A.M. labs  PLAN: Continue w/ POC, CC/SW consult ordered, plan to transfer to  when bed available

## 2024-07-31 NOTE — PROGRESS NOTES
GYN ONC PROGRESS NOTE  07/31/2024    HD#3 new neurologic symptoms (confusion, dysarthria/dyskinesia)  Disease: Serous uterine carcinoma s/p XL, SNEHA, BSO, EDIE and cystotomy repair with suprapubic bladder insertion and omental flap on 7/12/24. TINY drain in place. Hx REMA-III s/p laser tx. Remote hx cervical cancer s/p radiation.    24 hour events:   - Blood transfusion consent completed given low Hgb  - Psych recs: restart Seroquel at lower dose, discontinue benztropine, restart gabapentin 300 TID 7/31   - S/p WOC, SW consultations  - More oriented/interactive, some ongoing motor symptoms    SUBJECTIVE:   Patient is sleeping at the time of pre-rounds, wakes to voice and light touch. Endorses mild pain across her belly this morning. Denies nausea or other concerns.    OBJECTIVE:   Patient Vitals for the past 24 hrs:   BP Temp Temp src Pulse Resp SpO2   07/31/24 0600 -- -- -- 54 16 100 %   07/30/24 2250 94/62 97.7  F (36.5  C) Oral 50 18 99 %   07/30/24 1840 102/65 -- -- 73 18 98 %   07/30/24 1546 91/52 97.9  F (36.6  C) Oral -- 20 100 %   07/30/24 1315 94/49 -- -- -- -- --   07/30/24 1145 -- 97.3  F (36.3  C) Oral 57 16 98 %   07/30/24 0815 128/86 -- -- 54 16 100 %   07/30/24 0800 (!) 81/50 -- -- 53 -- --   07/30/24 0700 (!) 89/52 -- -- 56 -- --     Constitutional: chronically ill-appearing female in no acute distress, sleeping in bed  Cardiovascular: Bradycardia with regular rhythm; no murmurs, clicks, gallops or rub appreciated  Respiratory: Clear to auscultation bilaterally without crackles or wheeze  Gastrointestinal: Abdomen soft, non-tender. Suprapubic catheter and TINY drain sites are C/D/I. Drain sponge taped in place. Moderate amount of serous drainage present in TINY drain bulb.  : Clear, dark yellow urine present in both urinary kent and suprapubic catheter drainage bags.  Neuro: Alert and interactive. Speech is much more clear this morning. Intermittently tremulous versus jerking motions of bilateral distal  upper extremities.  Extremities: BLE without significant appreciable edema.     New Labs/Imaging-   Results for orders placed or performed during the hospital encounter of 07/29/24 (from the past 24 hour(s))   ABO/Rh type and screen *Canceled*    Narrative    The following orders were created for panel order ABO/Rh type and screen.  Procedure                               Abnormality         Status                     ---------                               -----------         ------                       Please view results for these tests on the individual orders.   ABO/Rh type and screen *Canceled*    Narrative    The following orders were created for panel order ABO/Rh type and screen.  Procedure                               Abnormality         Status                     ---------                               -----------         ------                       Please view results for these tests on the individual orders.   ABO/Rh type and screen    Narrative    The following orders were created for panel order ABO/Rh type and screen.  Procedure                               Abnormality         Status                     ---------                               -----------         ------                     Adult Type and Screen[[id number]]                            Final result                 Please view results for these tests on the individual orders.   CBC with platelets   Result Value Ref Range    WBC Count 6.0 4.0 - 11.0 10e3/uL    RBC Count 3.00 (L) 3.80 - 5.20 10e6/uL    Hemoglobin 7.2 (L) 11.7 - 15.7 g/dL    Hematocrit 23.7 (L) 35.0 - 47.0 %    MCV 79 78 - 100 fL    MCH 24.0 (L) 26.5 - 33.0 pg    MCHC 30.4 (L) 31.5 - 36.5 g/dL    RDW 25.4 (H) 10.0 - 15.0 %    Platelet Count 265 150 - 450 10e3/uL   Adult Type and Screen   Result Value Ref Range    ABO/RH(D) O POS     Antibody Screen Negative Negative    SPECIMEN EXPIRATION DATE [id number]        Pending cultures (date obtained):   - Blood culture  "(7/29): NGTD  - Urine culture (7/29): negative        ASSESSMENT:   69 year old female with serous uterine carcinoma s/p recent XL, SNEHA, BSO, EDIE with Dr. Perez c/b cystotomy on 7/12/2024 with suprapubic and urethral catheters in place. Also underwent laser treatment for REMA-III in 2021 and remote pelvic radiation for cervical cancer. Readmitted to Gynecologic Oncology service 7/21/2024 for management of SBO and UTI, discharged to TCU 7/26/2024. Now re-presenting to ED for evaluation of new/progressive neurological symptoms including confusion and dysarthria over the past two days. Also new dyskinetic movements appreciated on Gynecologic Oncology team evaluation that are new since recent admissions. Per Neurology consultation, \"Overall impression at this time is that this patient is experiencing medication toxicity in the setting of multiple CNS medications,\" exacerbated by worsening renal function. Symptoms gradually improving with discontinuation of suspected anti-dopaminergic and anti-cholinergic agents; restarting indicated medications under guidance of Neurology and Psychiatry consultants.    # Confusion  # Dysarthria  # Dyskinesia  - Appreciate Neurology, Psychiatry recommendations:  - Initially held all medications that could be contributing to dystonic reaction, including: Seroquel, Tizanidine, Robaxin, Ambien, Cogentin, oxybutynin, oxycodone, gabapentin   - Gradually restarting scheduled Seroquel, gabapentin with appropriate PRNs available  - Scheduled melatonin at bedtime per Neurology, delirium precautions     # Schizophrenia  # Depression  - PTA Zoloft continued  - PTA Seroquel, cogentin, vistaril, and Ambien; appreciate Neurology and Psychiatry recommendations regarding temporary vs permanent discontinuation of these agents with new neurological symptoms (see above)  - Unclear outpatient compliance, recent TCU administration of above agents; working to clarify (see separate progress notes from 7/30 " detailing conversations with TCU staff, patient's family members)     # Intermittent hypotension  # Supplemental O2 requirement  - S/p NS bolus overnight HD#1-2 (total of 1L since presentation to ED)  - Although no clear localizing chest symptoms, consider chest imaging to evaluate for pneumonia if vital sign changes are persistent    # Recent UTI, negative urine culture (7/29)  # Recent superficial SSI  - s/p Zosyn x1 in ED 7/29  - complete Bactrim/Flagyl course through 7/31  - f/up blood cultures collected in ED (NGTD)     # Recent intraoperative cystotomy  # Suprapubic catheter, urethral kent catheter in place  - Outpatient visit with Urology scheduled 8/7/2024; contact if still inpatient at that time  - Recent ~14d course of oxybutynin, held/will not resume given neurologic symptoms  - Pyridium prn available     # Cr elevation  # Possible underlying CKD  - Continue to trend serum Cr, UOP  - Suspect Cr elevation constitutes prerenal MADHU d/t dehydration   - If persistent c/f MADHU, further laboratory/imaging workup as indicated  - Consider Urology and/or Nephrology involvement pending clinical course     # Anemia  - Goal Hgb >7  - Blood transfusion consent signed    # Perianal skin breakdown  - WOC following     # Chronic pain  # Osteoarthritis  - Scheduled tylenol  - PTA gabapentin held given neurologic symptoms, will restart per Neurology/Psychiatry guidance  - lidocaine patches prn     # Hypertension  - PTA lasix held given Cr elevation, resume pending labs/BP     # Atrial fibrillation  - Patient currently in sinus bradycardia  - Continue PTA atenolol     # History of gastric bypass  - Avoid NSAIDs     # Constipation  - Scheduled Miralax     # Serous uterine carcinoma, s/p SNEHA/BSO  # History of REMA-III, s/p laser therapy  # History of cevical cancer, s/p radiation therapy  - Plan to follow-up outpatient in clinic with primary oncologist Dr. Perez for long term treatment planning     # Inpatient status  - PPX:  PTA abixaban (on 28d post-op course to be completed 8/9), SCDs, IS; consider PPI if prolonged admission  - PT/OT consults ordered for deconditioning, dispo recommendations  - SW consult placed for dispo planning  - Pharmacy consult placed for reconciliation after presentation with polypharmacy     Anali Rodríguez MD, PGY-4  6:05 AM  [facility]etric[facility] & Gynecology Residency    Gyn Onc Pager: [phone]       The patient was seen and examined with the resident, the labs and vital signs were reviewed. The medical care plan outlined above was provided under my directives and direct supervision.     Yeimy Muniz MD, MPH  Gynecologic Oncology

## 2024-07-31 NOTE — PROGRESS NOTES
"CLINICAL NUTRITION SERVICES - ASSESSMENT NOTE     Nutrition Prescription    Malnutrition Status:    Patient does not meet two of the established criteria necessary for diagnosing malnutrition but is at risk for malnutrition    Recommendations already ordered by Registered Dietitian (RD):  Continue Ensure Enlive     Future/Additional Recommendations:  -- monitor oral intake of meals and supplements      REASON FOR ASSESSMENT  Alis Hartman is a/an 69 year old female assessed by the dietitian for Provider Order - malnutrition     NUTRITION HISTORY  Aranza reports her appetite is fair. She likes chocolate milk. Denies any recent weight loss.     CURRENT NUTRITION ORDERS  Diet: Regular with ensure enlive with meals   Intake/Tolerance: AM tray ~ 75%    LABS  Labs reviewed  (7/31): BUN 10.4 mg/dL, Cr 1.2 mg/dL (H)    MEDICATIONS  Medications reviewed  Miralax    ANTHROPOMETRICS  Height: 154 cm (5' 0.63\")  Most Recent Weight: 75 kg (7/25/24)     IBW: 46.9 kg  BMI: Obesity Grade I BMI 30-34.9  Weight History:   Wt Readings from Last 10 Encounters:   07/25/24 75 kg (165 lb 6.4 oz)   07/15/24 81.3 kg (179 lb 3.7 oz)   05/28/24 73 kg (161 lb)   05/17/24 74.4 kg (164 lb 0.4 oz)   05/15/24 75.3 kg (166 lb 1.6 oz)   05/15/24 74.8 kg (165 lb)   04/23/24 76.2 kg (168 lb)   04/11/24 76.1 kg (167 lb 12.8 oz)   02/28/24 75.9 kg (167 lb 6.4 oz)   05/23/23 93.3 kg (205 lb 9.6 oz)   Weight appears stable over the last 5 months. + weight loss in > 1 year of 19.7%  Dosing Weight: 54 kg (adjBW based on IBW and last weight)    ASSESSED NUTRITION NEEDS  Estimated Energy Needs: 6802-7892 kcals/day (25 - 30 kcals/kg)  Justification: Increased needs  Estimated Protein Needs: 65-81 grams protein/day (1.2 - 1.5 grams of pro/kg)  Justification: Increased needs  Estimated Fluid Needs: 3148-6480 mL/day (25 - 30 mL/kg)   Justification: Maintenance    PHYSICAL FINDINGS  See malnutrition section below.  Skin: WOC nurse following (7/30): surgical " wound and coccyx skin injury due to MASD    MALNUTRITION  % Intake: No decreased intake noted  % Weight Loss: None noted  Subcutaneous Fat Loss: None observed  Muscle Loss: None observed  Fluid Accumulation/Edema: Mild  Malnutrition Diagnosis: Patient does not meet two of the established criteria necessary for diagnosing malnutrition but is at risk for malnutrition    NUTRITION DIAGNOSIS  Increased nutrient needs energy/protein related to skin injuries, s/p surgery as evidenced by increased calorie/protein needs       INTERVENTIONS  Implementation  Nutrition Education: Provided education on RD role in nutrition POC, nutritional supplements      Goals  Patient to consume % of nutritionally adequate meal trays TID, or the equivalent with supplements/snacks.     Monitoring/Evaluation  Progress toward goals will be monitored and evaluated per protocol.  Lizbeth Coyle MS/RD/LD/CNSC  6C (beds 8061-3368) + 7C (beds 2624-9535) + ED   Available in Vocera by name or unit dietitian  No longer available via pager

## 2024-08-01 ENCOUNTER — APPOINTMENT (OUTPATIENT)
Dept: PHYSICAL THERAPY | Facility: CLINIC | Age: 69
DRG: 091 | End: 2024-08-01
Payer: COMMERCIAL

## 2024-08-01 LAB
ALBUMIN SERPL BCG-MCNC: 2.1 G/DL (ref 3.5–5.2)
ALP SERPL-CCNC: 53 U/L (ref 40–150)
ALT SERPL W P-5'-P-CCNC: <5 U/L (ref 0–50)
ANION GAP SERPL CALCULATED.3IONS-SCNC: 7 MMOL/L (ref 7–15)
AST SERPL W P-5'-P-CCNC: 9 U/L (ref 0–45)
BILIRUB SERPL-MCNC: 0.2 MG/DL
BUN SERPL-MCNC: 10.9 MG/DL (ref 8–23)
CALCIUM SERPL-MCNC: 8.2 MG/DL (ref 8.8–10.4)
CHLORIDE SERPL-SCNC: 115 MMOL/L (ref 98–107)
CREAT SERPL-MCNC: 1.19 MG/DL (ref 0.51–0.95)
EGFRCR SERPLBLD CKD-EPI 2021: 49 ML/MIN/1.73M2
ERYTHROCYTE [DISTWIDTH] IN BLOOD BY AUTOMATED COUNT: 26 % (ref 10–15)
GLUCOSE SERPL-MCNC: 71 MG/DL (ref 70–99)
HCO3 SERPL-SCNC: 20 MMOL/L (ref 22–29)
HCT VFR BLD AUTO: 27.3 % (ref 35–47)
HGB BLD-MCNC: 8.6 G/DL (ref 11.7–15.7)
MCH RBC QN AUTO: 25.2 PG (ref 26.5–33)
MCHC RBC AUTO-ENTMCNC: 31.5 G/DL (ref 31.5–36.5)
MCV RBC AUTO: 80 FL (ref 78–100)
PLATELET # BLD AUTO: 248 10E3/UL (ref 150–450)
POTASSIUM SERPL-SCNC: 4.6 MMOL/L (ref 3.4–5.3)
PROT SERPL-MCNC: 5 G/DL (ref 6.4–8.3)
RBC # BLD AUTO: 3.41 10E6/UL (ref 3.8–5.2)
SODIUM SERPL-SCNC: 142 MMOL/L (ref 135–145)
WBC # BLD AUTO: 5.8 10E3/UL (ref 4–11)

## 2024-08-01 PROCEDURE — 80053 COMPREHEN METABOLIC PANEL: CPT

## 2024-08-01 PROCEDURE — 97116 GAIT TRAINING THERAPY: CPT | Mod: GP | Performed by: PHYSICAL THERAPIST

## 2024-08-01 PROCEDURE — 250N000013 HC RX MED GY IP 250 OP 250 PS 637

## 2024-08-01 PROCEDURE — 250N000013 HC RX MED GY IP 250 OP 250 PS 637: Performed by: NURSE PRACTITIONER

## 2024-08-01 PROCEDURE — 97530 THERAPEUTIC ACTIVITIES: CPT | Mod: GP | Performed by: PHYSICAL THERAPIST

## 2024-08-01 PROCEDURE — 85027 COMPLETE CBC AUTOMATED: CPT

## 2024-08-01 PROCEDURE — 120N000002 HC R&B MED SURG/OB UMMC

## 2024-08-01 PROCEDURE — 36415 COLL VENOUS BLD VENIPUNCTURE: CPT

## 2024-08-01 PROCEDURE — 99233 SBSQ HOSP IP/OBS HIGH 50: CPT | Mod: 24 | Performed by: OBSTETRICS & GYNECOLOGY

## 2024-08-01 RX ORDER — FUROSEMIDE 20 MG
20 TABLET ORAL DAILY
Status: DISCONTINUED | OUTPATIENT
Start: 2024-08-02 | End: 2024-08-01

## 2024-08-01 RX ORDER — GABAPENTIN 100 MG/1
200 CAPSULE ORAL 3 TIMES DAILY
Status: DISCONTINUED | OUTPATIENT
Start: 2024-08-01 | End: 2024-08-09 | Stop reason: HOSPADM

## 2024-08-01 RX ORDER — FUROSEMIDE 20 MG
20 TABLET ORAL DAILY
Status: DISCONTINUED | OUTPATIENT
Start: 2024-08-01 | End: 2024-08-09 | Stop reason: HOSPADM

## 2024-08-01 RX ADMIN — ACETAMINOPHEN 650 MG: 325 TABLET, FILM COATED ORAL at 21:12

## 2024-08-01 RX ADMIN — FUROSEMIDE 20 MG: 20 TABLET ORAL at 11:26

## 2024-08-01 RX ADMIN — APIXABAN 2.5 MG: 2.5 TABLET, FILM COATED ORAL at 21:12

## 2024-08-01 RX ADMIN — APIXABAN 2.5 MG: 2.5 TABLET, FILM COATED ORAL at 08:34

## 2024-08-01 RX ADMIN — ATENOLOL 25 MG: 25 TABLET ORAL at 08:34

## 2024-08-01 RX ADMIN — ACETAMINOPHEN 650 MG: 325 TABLET, FILM COATED ORAL at 08:34

## 2024-08-01 RX ADMIN — Medication 1 MG: at 21:12

## 2024-08-01 ASSESSMENT — ACTIVITIES OF DAILY LIVING (ADL)
ADLS_ACUITY_SCORE: 42

## 2024-08-01 NOTE — PROGRESS NOTES
Care Management Follow Up    Length of Stay (days): 2    Expected Discharge Date: 2024     Concerns to be Addressed: discharge planning     Patient plan of care discussed at interdisciplinary rounds: Yes    Anticipated Discharge Disposition: Skilled Nursing Facility, Transitional Care     Anticipated Discharge Services:    Anticipated Discharge DME: None    Patient/family educated on Medicare website which has current facility and service quality ratings: yes  Education Provided on the Discharge Plan: Yes  Patient/Family in Agreement with the Plan: yes    Referrals Placed by CM/SW:    [facility] at [address].  [place] [address]  Ph: [phone]  Adm: [phone]  Fax: [phone]  1523 Initial SNF Referral sent via Epic to SNF for review.    : Still reviewing and will call back once done.    [facility]  [address] [place] [address]  Ph: [phone]  Adm: [phone]  Fax: [phone]  1550 Initial SNF Referral sent via [employer] to SNF for review.   : CHW left a voicemail for admissions and will continue to follow up     [facility]  [address] ,  [place] [address]  Ph: [phone]  Adm: [phone]  Fax: [phone]   [facility] Global Referral (Liaison Lissa Rojas; P: [phone]; F: [phone]; E: [email or web address])  8034 Initial SNF Referral sent via [employer] to [facility] Intake for review.  : Lissa relayed that Aranza's insurance  on 24 and will need to be renewed/reinstated before being able to accept..      [facility]  [facility]  [facility] - cannot meet pt's needs  [facility] - [facility] not available  [facility] - cannot meet pt's needs    Private pay costs discussed: Not applicable    Additional Information:    CHW delegated to follow up. CHW found out from one of the facilities that [place] [facility]   [place] of [place] and will need to be re-instated via the [facility]. She will need to have it renewed before the end of the 90 frankie period she is currently under. CHW informed Aranza and attempted to call her daughter, Joy, twice but could not get ahold of her and could not leave voicemail due to her mailbox being full. [facility] agent shared that the renewal could take up to 30 days or longer, as they have to wait for the [facility] to approve. CHW to make sure to continue to follow up Friday morning 8/2, to assist in the process of renewing her insurance.      Jeanette De Santiago  5A/5C Community Health Worker  [place], [place]  [email or web address]  [phone]

## 2024-08-01 NOTE — PLAN OF CARE
Goal Outcome Evaluation:      Plan of Care Reviewed With: patient, child    Overall Patient Progress: improvingOverall Patient Progress: improving    Temp: 98.1  F (36.7  C) Temp src: Oral BP: 110/57 Pulse: 63   Resp: 18 SpO2: 100 % O2 Device: None (Room air)    NEURO: A&Ox4, pleasant/cooperative, able to make needs known  RESPIRATORY: WNL on RA, denies SOB  CARDIAC: WNL, intermittent bradycardia mid-50s at baseline, denies chest pain  GI/: Kent and suprapubic catheters in place, AUOP, output from suprapubic>kent, large/formed BMx1 this shift, denies nausea, denies abd discomfort  SKIN: New interdry to abdominal fold skin tear today, sacral wound cares done x2 this shift- ordered BID, RLQ TINY to bulb suction w scant serous output,+2 BLE edema  DIET: Regular, assistance w/ ordering and tray setup provided  PAIN/NAUSEA: Denies this shift  IV ACCESS: L-PIV SL  ACTIVITY: Assist x1 w/ gait belt & walker  LAB: Reviewed, unremarkable  PLAN: Continue w/ POC, PT following, CC following, plan to discharge to TCU when placement found

## 2024-08-01 NOTE — PROGRESS NOTES
ADMISSION to [facility] [place] UNIT: ED     Alis Hartman was admitted from TCU for new/progressive neurological symptoms including confusion and dysarthria  .  2 RN skin assessment: completed by Ginger Mckeon RN and Gina Cooper RN.  Result of skin assessment and interventions/actions: Sacral wound, abdominal wound, suprapubic catheter, kent catherter, RUQ TINY x1, PIV x 1.   Height, weight: completed? No   Patient belongings & admission documents: see Flowsheets, completed? yes  MDRO education added to care plan: no

## 2024-08-01 NOTE — PROGRESS NOTES
GYN ONC PROGRESS NOTE  07/31/2024    HD#4 new neurologic symptoms (confusion, dysarthria/dyskinesia)  Disease: Serous uterine carcinoma s/p XL, SNEHA, BSO, EDIE and cystotomy repair with suprapubic bladder insertion and omental flap on 7/12/24. TINY drain in place. Hx REMA-III s/p laser tx. Remote hx cervical cancer s/p radiation.    24 hour events:   - Hgb 6.6> 1U pRBC; daughter updated 0800 w/ plan  - Mentation improved, more interactive, no motor symptoms   - LLQ abd pain>restarted PTA oxy 5mg q4h prn  - SW discussed TCU placement w/ Aranza and Joy- referrals placed  - Bradycardia and hypotension> decrease atenolol to 25mg  - PT: discharge to TCU  - Nutrition: continue Ensure Enlive and monitor PO intake     SUBJECTIVE:   Patient is very happy with her new room this morning and states that it's much more comfortable than the last one temperature-wise. Denies pain, nausea or other concerns. Requesting help up to the bathroom, RN informed.    OBJECTIVE:   Patient Vitals for the past 24 hrs:   BP Temp Temp src Pulse Resp SpO2   07/31/24 1957 102/70 97.5  F (36.4  C) Oral 58 16 100 %   07/31/24 1449 98/51 98.6  F (37  C) Oral 51 16 100 %   07/31/24 1232 91/58 98.1  F (36.7  C) Oral 64 16 100 %   07/31/24 1214 104/52 98.6  F (37  C) Oral 69 16 100 %   07/31/24 0600 96/58 97.9  F (36.6  C) Oral 54 16 100 %   07/30/24 2250 94/62 97.7  F (36.5  C) Oral 50 18 99 %     Constitutional: chronically ill-appearing female in no acute distress, resting in bed  Cardiovascular: Appears well perfused  Respiratory: Normal work of breathing on room air  Gastrointestinal: Abdomen soft, non-tender. Suprapubic catheter and TINY drain sites are C/D/I. Drain sponge taped in place. Moderate amount of serous drainage present in TINY drain bulb.  : Clear, dark yellow urine present in both urinary kent and suprapubic catheter drainage bags.  Neuro: Alert and interactive. Speech is much more clear this morning. Tremulous versus jerking motions of  bilateral distal upper extremities are no longer seen.  Extremities: BLE without significant appreciable edema.     New Labs/Imaging-   Results for orders placed or performed during the hospital encounter of 07/29/24 (from the past 24 hour(s))   CBC with platelets   Result Value Ref Range    WBC Count 5.6 4.0 - 11.0 10e3/uL    RBC Count 2.81 (L) 3.80 - 5.20 10e6/uL    Hemoglobin 6.6 (LL) 11.7 - 15.7 g/dL    Hematocrit 23.4 (L) 35.0 - 47.0 %    MCV 83 78 - 100 fL    MCH 23.5 (L) 26.5 - 33.0 pg    MCHC 28.2 (L) 31.5 - 36.5 g/dL    RDW 26.3 (H) 10.0 - 15.0 %    Platelet Count 232 150 - 450 10e3/uL   Comprehensive metabolic panel   Result Value Ref Range    Sodium 141 135 - 145 mmol/L    Potassium 4.4 3.4 - 5.3 mmol/L    Carbon Dioxide (CO2) 19 (L) 22 - 29 mmol/L    Anion Gap 7 7 - 15 mmol/L    Urea Nitrogen 10.4 8.0 - 23.0 mg/dL    Creatinine 1.20 (H) 0.51 - 0.95 mg/dL    GFR Estimate 48 (L) >60 mL/min/1.73m2    Calcium 7.7 (L) 8.8 - 10.4 mg/dL    Chloride 115 (H) 98 - 107 mmol/L    Glucose 75 70 - 99 mg/dL    Alkaline Phosphatase 51 40 - 150 U/L    AST 13 0 - 45 U/L    ALT <5 0 - 50 U/L    Protein Total 4.6 (L) 6.4 - 8.3 g/dL    Albumin 1.8 (L) 3.5 - 5.2 g/dL    Bilirubin Total <0.2 <=1.2 mg/dL   Magnesium   Result Value Ref Range    Magnesium 1.7 1.7 - 2.3 mg/dL   Phosphorus   Result Value Ref Range    Phosphorus 2.6 2.5 - 4.5 mg/dL   Prepare red blood cells (unit)   Result Value Ref Range    Blood Component Type Red Blood Cells     Product Code [id number]     Unit Status Transfused     Unit Number [id number]     CROSSMATCH Compatible     CODING SYSTEM [id number]     ISSUE DATE AND TIME [id number]     UNIT ABO/RH O+     UNIT TYPE ISBT 5100        Pending cultures (date obtained):   - Blood culture (7/29): NGTD  - Urine culture (7/29): negative        ASSESSMENT:   69 year old female with serous uterine carcinoma s/p recent SATURNINO, SNEHA, SEINA, EDIE with Dr. Perez c/b cystotomy on 7/12/2024 with suprapubic and urethral  "catheters in place. Also underwent laser treatment for REMA-III in 2021 and remote pelvic radiation for cervical cancer. Readmitted to Gynecologic Oncology service 7/21/2024 for management of SBO and UTI, discharged to TCU 7/26/2024. Now re-presenting to ED for evaluation of new/progressive neurological symptoms including confusion and dysarthria over the past two days. Also new dyskinetic movements appreciated on Gynecologic Oncology team evaluation that are new since recent admissions. Per Neurology consultation, \"Overall impression at this time is that this patient is experiencing medication toxicity in the setting of multiple CNS medications,\" exacerbated by worsening renal function. Symptoms gradually improving with discontinuation of suspected anti-dopaminergic and anti-cholinergic agents; restarting indicated medications under guidance of Neurology and Psychiatry consultants. See italicized text for current regimen. Nearing medical stability for discharge, awaiting TCU placement.    # Confusion  # Dysarthria  # Dyskinesia  - Appreciate Neurology, Psychiatry recommendations:  - Initially held all medications that could be contributing to dystonic reaction, including: Seroquel, Tizanidine, Robaxin, Ambien, Cogentin, oxybutynin, oxycodone, gabapentin   - Gradually restarting scheduled Seroquel, gabapentin with appropriate PRNs available  - Scheduled melatonin at bedtime per Neurology, delirium precautions  - Current scheduled medications: gabapentin 100 mg TID, Seroquel 200 mg at bedtime, melatonin 1 mg at bedtime   - Current PRN medications: Cogentin 1 mg daily as needed for extrapyramidal symptoms, Ambien 10 mg at bedtime as needed for insomnia, oxycodone 5 mg [id number] as needed for pain     # Schizophrenia  # Depression  - PTA Zoloft 150 mg continued  - PTA Seroquel, cogentin, vistaril, and Ambien; appreciate Neurology and Psychiatry recommendations regarding temporary vs permanent discontinuation of these " agents with new neurological symptoms (see above)  - Unclear outpatient compliance, recent TCU administration of above agents; worked to clarify (see separate progress notes from 7/30 detailing conversations with TCU staff, patient's family members)     # Intermittent hypotension, improved  # Supplemental O2 requirement, now weaned  - S/p NS bolus overnight HD#1-2 (total of 1L since presentation to ED)  - Although no clear localizing chest symptoms, consider chest imaging to evaluate for pneumonia if vital sign changes recur    # Recent UTI, negative urine culture (7/29)  # Recent superficial SSI  - s/p Zosyn x1 in ED 7/29  - completed Bactrim/Flagyl course on 7/31  - f/up blood cultures collected in ED (NGTD)     # Recent intraoperative cystotomy  # Suprapubic catheter, urethral kent catheter in place  - Outpatient visit with Urology scheduled 8/7/2024; contact if still inpatient at that time  - Recent ~14d course of oxybutynin, held/will not resume given neurologic symptoms  - Pyridium prn inially available, held as she had not required this     # Cr elevation, improving  # Possible underlying CKD  - Continue to trend serum Cr, UOP  - Suspect Cr elevation constitutes prerenal MADHU d/t dehydration with disorientation  - If persistent c/f MADHU, further laboratory/imaging workup as indicated  - Consider Urology and/or Nephrology involvement pending clinical course     # Anemia  - Goal Hgb >7, continue to trend  - received 1u pRBCs 7/31    # Perianal skin breakdown  - WOC following     # Chronic pain  # Osteoarthritis  - Scheduled tylenol  - PTA initially held gabapentin held given neurologic symptoms, restarted per Neurology/Psychiatry guidance   - PTA dose = 1600 mg per day, currently on 300 mg total, so could up-titrate as needed  - PTA prn oxycodone also initially held but now restarted; lidocaine patches prn also available     # Hypertension  - PTA lasix held given Cr elevation, resume pending labs/BP     # Atrial  fibrillation  - Patient currently in sinus bradycardia  - Continue PTA atenolol   - Dose reduced from 100 to 25 mg daily given persistent hypotension and bradycardia, now improved     # History of gastric bypass  - Avoid NSAIDs     # Constipation  - Scheduled Miralax     # Serous uterine carcinoma, s/p SNEHA/BSO  # History of REMA-III, s/p laser therapy  # History of cevical cancer, s/p radiation therapy  - Plan to follow-up outpatient in clinic with primary oncologist Dr. Perez for long term treatment planning     # Inpatient status  - PPX: PTA abixaban (on 28d post-op course to be completed 8/9), SCDs, IS; PPI now added for prolonged admission  - PT/OT consults ordered for deconditioning, dispo recommendations  - SW consult placed for dispo planning; TCU placement pending  - Pharmacy consult placed for reconciliation after presentation with polypharmacy     Anali Rodríguez MD, PGY-4  6:28 AM  [facility] Obstetrics & Gynecology Residency    Gyn Onc Pager: [phone]       The patient was seen and examined with the resident, the labs and vital signs were reviewed. The medical care plan outlined above was provided under my directives and direct supervision.     Yeimy Muniz MD, MPH  Gynecologic Oncology

## 2024-08-01 NOTE — PROGRESS NOTES
"Brief Progress Note     Informed by RN that Aranza has been consistently declining some of her medications including gabapentin, pantoprazole, Seroquel, sertraline, and Tylenol. Went to discuss with patient why she has been declining to take these medications. Aranza expresses that her main reason is that she is scared and confused about what happened to her this weekend when she felt \"loopy,\" and that she has been declining medications because she is not sure which medication caused her to feel like \"she wasn't herself.\" She also has questions about why she was brought into the ED in the first place. Reviewed with patient that she was brought to the ED over the weekend because her family and staff at the TCU noticed a change in her mental status and some abnormal movements. Aranza is surprised and appropriately upset when hearing this. Further discussed that this was likely a cumulative effect of the may different CNS acting medications she was taking, including some added during her previous admission and change in dosing to how she was taking them previously, rather than the effect of one single medication. Discussed that we had consulted our neurology and psychiatry colleagues and to optimize her medications, and have discontinued no longer needed medications and decreased dosing of essential medications so to avoid this reaction from happening again.    At the end of our discussion, again asked patient if she would be open to resume some of her PTA medications which we recommended while she is here in the hospital so we can monitor her closely. She shared that she is unsure at this time because she is confused about the changes our team has made to her medications. Provided patient with a list of medications give to her at [facility] prior to this admission and what medications/dosages we would recommend that she now resume. Aranza is going to review this list and plan to touch base again with the team later " today.     Krupa Escobar MD  [facility]  Obstetrics, Gynecology, & Women's Health PGY1

## 2024-08-01 NOTE — PLAN OF CARE
Goal Outcome Evaluation:  Arrived from ED approx 2240  A&Ox4. Contin pulse ox. Bradycardic. RA. Bed alarm on. Assist x 1 w/ walker. Not OOB. Reg diet. Denied nausea. Denied pain. Suprapubic catheter. Kent catheter. Urine output from both Suprapubic and kent cath. Passing gas. No BM. Abdominal wound -SHAILESH. Sacral wound - new mepilex in place, wound care BID. RLQ TINY x 1 - bulb suction, serous output. PIV x 1 - SL.     Plan of Care Reviewed With: patient    Overall Patient Progress: no changeOverall Patient Progress: no change

## 2024-08-01 NOTE — PROGRESS NOTES
Brief Gyn Onc Progress Note    At bedside for evening check-in with patient. She is awake and interactive with several questions/concerns. States that her room is very cold and inquired about the possibility of moving to another room. Shared with her that moving rooms is not likely, but that we will do our best to keep her warm in this space. Warm blankets provided. Patient also said that her right sock is uncomfortable on her ankle; adjusted this for her as requested. Also let me know that her bottom is sore and that she would like to change positions; confirmed with bedside nursing team that they are planning to come in shortly and provide wound cares and repositioning. No other questions/concerns. Encouraged patient to continue making her needs known overnight.    Anali Rodríguez MD, PGY-4  8:18 PM  [facility] Obstetrics & Gynecology Residency

## 2024-08-02 LAB
ANION GAP SERPL CALCULATED.3IONS-SCNC: 6 MMOL/L (ref 7–15)
BUN SERPL-MCNC: 12 MG/DL (ref 8–23)
CALCIUM SERPL-MCNC: 8.2 MG/DL (ref 8.8–10.4)
CHLORIDE SERPL-SCNC: 112 MMOL/L (ref 98–107)
CREAT SERPL-MCNC: 1.37 MG/DL (ref 0.51–0.95)
EGFRCR SERPLBLD CKD-EPI 2021: 41 ML/MIN/1.73M2
ERYTHROCYTE [DISTWIDTH] IN BLOOD BY AUTOMATED COUNT: 26.1 % (ref 10–15)
GLUCOSE SERPL-MCNC: 98 MG/DL (ref 70–99)
HCO3 SERPL-SCNC: 20 MMOL/L (ref 22–29)
HCT VFR BLD AUTO: 26.8 % (ref 35–47)
HGB BLD-MCNC: 8.6 G/DL (ref 11.7–15.7)
MCH RBC QN AUTO: 25.7 PG (ref 26.5–33)
MCHC RBC AUTO-ENTMCNC: 32.1 G/DL (ref 31.5–36.5)
MCV RBC AUTO: 80 FL (ref 78–100)
PLATELET # BLD AUTO: 231 10E3/UL (ref 150–450)
POTASSIUM SERPL-SCNC: 4.5 MMOL/L (ref 3.4–5.3)
RBC # BLD AUTO: 3.35 10E6/UL (ref 3.8–5.2)
SODIUM SERPL-SCNC: 138 MMOL/L (ref 135–145)
WBC # BLD AUTO: 7.1 10E3/UL (ref 4–11)

## 2024-08-02 PROCEDURE — 99232 SBSQ HOSP IP/OBS MODERATE 35: CPT | Mod: 24 | Performed by: OBSTETRICS & GYNECOLOGY

## 2024-08-02 PROCEDURE — 250N000013 HC RX MED GY IP 250 OP 250 PS 637: Performed by: NURSE PRACTITIONER

## 2024-08-02 PROCEDURE — 120N000002 HC R&B MED SURG/OB UMMC

## 2024-08-02 PROCEDURE — 258N000003 HC RX IP 258 OP 636

## 2024-08-02 PROCEDURE — 250N000013 HC RX MED GY IP 250 OP 250 PS 637

## 2024-08-02 PROCEDURE — 99418 PROLNG IP/OBS E/M EA 15 MIN: CPT | Mod: GC | Performed by: PSYCHIATRY & NEUROLOGY

## 2024-08-02 PROCEDURE — 99233 SBSQ HOSP IP/OBS HIGH 50: CPT | Mod: GC | Performed by: PSYCHIATRY & NEUROLOGY

## 2024-08-02 PROCEDURE — 85027 COMPLETE CBC AUTOMATED: CPT

## 2024-08-02 PROCEDURE — 80048 BASIC METABOLIC PNL TOTAL CA: CPT

## 2024-08-02 PROCEDURE — 999N000128 HC STATISTIC PERIPHERAL IV START W/O US GUIDANCE

## 2024-08-02 PROCEDURE — 36415 COLL VENOUS BLD VENIPUNCTURE: CPT

## 2024-08-02 RX ADMIN — ATENOLOL 25 MG: 25 TABLET ORAL at 08:57

## 2024-08-02 RX ADMIN — OXYCODONE HYDROCHLORIDE 5 MG: 5 TABLET ORAL at 16:46

## 2024-08-02 RX ADMIN — PANTOPRAZOLE SODIUM 40 MG: 40 TABLET, DELAYED RELEASE ORAL at 08:57

## 2024-08-02 RX ADMIN — SODIUM CHLORIDE, POTASSIUM CHLORIDE, SODIUM LACTATE AND CALCIUM CHLORIDE 500 ML: 600; 310; 30; 20 INJECTION, SOLUTION INTRAVENOUS at 10:47

## 2024-08-02 RX ADMIN — FUROSEMIDE 20 MG: 20 TABLET ORAL at 08:57

## 2024-08-02 RX ADMIN — Medication 1 MG: at 21:52

## 2024-08-02 RX ADMIN — APIXABAN 2.5 MG: 2.5 TABLET, FILM COATED ORAL at 08:57

## 2024-08-02 RX ADMIN — APIXABAN 2.5 MG: 2.5 TABLET, FILM COATED ORAL at 20:29

## 2024-08-02 RX ADMIN — OXYCODONE HYDROCHLORIDE 5 MG: 5 TABLET ORAL at 11:16

## 2024-08-02 RX ADMIN — ACETAMINOPHEN 650 MG: 325 TABLET, FILM COATED ORAL at 02:55

## 2024-08-02 ASSESSMENT — ACTIVITIES OF DAILY LIVING (ADL)
ADLS_ACUITY_SCORE: 41
ADLS_ACUITY_SCORE: 42
ADLS_ACUITY_SCORE: 41
ADLS_ACUITY_SCORE: 42
ADLS_ACUITY_SCORE: 41
ADLS_ACUITY_SCORE: 41
ADLS_ACUITY_SCORE: 42
ADLS_ACUITY_SCORE: 41
ADLS_ACUITY_SCORE: 41
ADLS_ACUITY_SCORE: 42
ADLS_ACUITY_SCORE: 41
ADLS_ACUITY_SCORE: 41
ADLS_ACUITY_SCORE: 42
ADLS_ACUITY_SCORE: 42
ADLS_ACUITY_SCORE: 41

## 2024-08-02 NOTE — CONSULTS
"      Psychiatry Consultation; Follow up              Reason for Consult, requesting source:    \"hx schizophrenia, anxiety, depression- recent medciation toxicity/CNS side effects, now declining many psych meds\"  Initially and last time seen on 7/30 by us (Dr. Mian Serna).   Requesting source: Yeimy Muniz    Labs and imaging reviewed, discussed with Multidisciplinary Rounds Team.    Total time spent in chart review, patient interview and coordination of care; 65 min           Interim history:    See full history in the Note from 7/30.    Per Primary Team, patient and daughter are concerned regarding her psychiatric medications (gabapentin, seroquel and sertraline), would like to touch base with Psychiatry before resuming them. Daughter shared concern that she has been on and off Seroquel and Zoloft for the past year, which has been ordered per Primary Psychiatrist but that she is not taking them on a regular base. She demands that her mother wishes should be respected. There is a report that the patient feels like when she takes Seroquel and Zoloft she experiences increase hallucinations. Primary Team  and Staff shared medical concerns of possible risks of abrupt discontinuation, they also re-explained to the patient what happened in the past few days and what is the current plan for her psychiatric treatment.    At bedside, Aranza pleasantly talked to us regarding her concern of being on the current medication given the experience of altered mental status over the last weekend. She states that she had experienced paranoia (hearing voices) in the past but that''s not currently upsetting her. When questioned how she would feel if the paranoia comes back by the discontinuation of the antipsychotic / zoloft, she is unsure about what would be best for her. She agreed to us that we could go ahead and talk to her daughter regarding further plan for medications.    Of note, patient did not take Seroquel nor Zoloft for " the past 2 days.    Discussion with daughter over the phone, she told us that patient has not been experiencing the paranoia symptoms or voices for a couple of years now and that she has not been very adherent to the antipsychotics. She would like us to stop the medication for now and watch her clinically. She understood the risk of the possibility of recurrent thought disorder symptoms.   (Although they say that Aranza is not consistent in taking the meds, she was filling them in reviewed with pharmacist)         Current Medications:     Current Facility-Administered Medications   Medication Dose Route Frequency Provider Last Rate Last Admin    acetaminophen (TYLENOL) tablet 650 mg  650 mg Oral Q6H Anali Rodríguez MD   650 mg at 08/02/24 0255    apixaban ANTICOAGULANT (ELIQUIS) tablet 2.5 mg  2.5 mg Oral BID Anali Rodríguez MD   2.5 mg at 08/02/24 0857    atenolol (TENORMIN) tablet 25 mg  25 mg Oral Daily Krupa Escobar MD   25 mg at 08/02/24 0857    furosemide (LASIX) tablet 20 mg  20 mg Oral Daily Kelsey Mccracken APRN CNP   20 mg at 08/02/24 0857    [Held by provider] gabapentin (NEURONTIN) capsule 200 mg  200 mg Oral TID Krupa Escobar MD        melatonin tablet 1 mg  1 mg Oral At Bedtime Anali Rodríguez MD   1 mg at 08/01/24 2112    pantoprazole (PROTONIX) EC tablet 40 mg  40 mg Oral QAM AC Anali Rodríguez MD   40 mg at 08/02/24 0857    polyethylene glycol (MIRALAX) Packet 17 g  17 g Oral Daily Anali Rodríguez MD   17 g at 07/31/24 0932    QUEtiapine (SEROquel) tablet 200 mg  200 mg Oral At Bedtime Anali Rodríguez MD        sertraline (ZOLOFT) tablet 150 mg  150 mg Oral Daily Anali Rodríguez MD   150 mg at 07/30/24 0937    sodium chloride (PF) 0.9% PF flush 3 mL  3 mL Intracatheter Q8H Anali Rodríguez MD   3 mL at 08/02/24 0859              MSE:   Appearance: awake, alert, adequately groomed, and dressed in hospital scrubs  Attitude:  cooperative  Eye Contact:  good  Mood:  good  Affect:  mood congruent and somewhat  "cautious  Speech:  clear, coherent  Psychomotor Behavior:  no evidence of tardive dyskinesia, dystonia, or tics  Muscle strength and tone: intact   Thought Process:  logical and linear  Associations:  no loose associations  Thought Content:  no evidence of suicidal ideation or homicidal ideation, no evidence of psychotic thought, no auditory hallucinations present, and no visual hallucinations present  Insight:  good  Judgement:  intact  Oriented to:  time, person, and place  Attention Span and Concentration:  intact  Recent and Remote Memory:  fair    Vital signs:  Temp: 98  F (36.7  C) Temp src: Oral BP: (!) 88/58 Pulse: 58   Resp: 14 SpO2: 99 % O2 Device: None (Room air) Oxygen Delivery: 1 LPM   Weight: 68.6 kg (151 lb 3.2 oz)  Estimated body mass index is 28.92 kg/m  as calculated from the following:    Height as of 7/23/24: 1.54 m (5' 0.63\").    Weight as of this encounter: 68.6 kg (151 lb 3.2 oz).    Qtc: 436 ms (on 7/29/2024)         DSM-5 Diagnosis:   Past diagnosis of Schizophrenia  RAFAEL   Unspecified neurocognitive Disorder  Recent Post-op of abdominal surgery  Recent UTI          Assessment:   Our re-involvement in Aranza's care is regarding her current concern about the possibility of Seroquel and Zoloft to cause recurrent CNS depression and/or AMS. Her symptoms during her recent admission to TCU probably happened in the setting of resuming her PTA medications in the high-dose without up titration. We would believe that she could continue to take her PTA medication in a lower dose with slow titration as outpatient.   During our conversation with her daughter, Joy, was very pleasant and understandable of the risk for paranoia recurrence off medications, but they were still insistent on her going off of them for now.             Summary of Recommendations:   Discontinue Seroquel and Zoloft.  Would monitor clinically for possible recurrence of the paranoia symptoms (she does have an outpatient " "psychiatrist that she can see as needed).     Contact me or re-consult psychiatry as needed (psychiatry is signing off).     I Mian Serna MD personally saw and examined the patient along with the resident.  I discussed the case and agree with the findings and plan as documented in this note.        Mian Serna M.D.   Consult liaison psychiatry   [facility]   Can message me with Vocera  If I am not available, then [facility] intake ([phone]) should know who   Is on call      \"Much or all of the text in this note was generated through the use of Dragon Dictate voice to text software. Errors in spelling or words which appear to be out of contact are unintentional, may be present due having escaped editing\"           "

## 2024-08-02 NOTE — PLAN OF CARE
BP 91/56 (BP Location: Right arm)   Pulse 59   Temp 97.9  F (36.6  C) (Oral)   Resp 16   Wt 78.9 kg (173 lb 15.1 oz)   SpO2 100%   BMI 33.27 kg/m     Neuro: A&Ox4. No new neuro deficit   Cardiac: Afebrile VSS.   Respiratory: Sating >95 % on RA.  GI/: Adequate urine output via knet and suprapubic cath. No BM this shift  Diet/appetite: Tolerating regular diet. Poor appetite.   Activity:  Assist of 1 up to chair and bedside commode   Pain: At acceptable level on current regimen. Denies pain   Skin: No new deficits noted.  LDA's: Kent and suprapubic cath    Plan: Continue with POC. Notify primary team with changes.   Goal Outcome Evaluation:      Plan of Care Reviewed With: patient, child    Overall Patient Progress: improvingOverall Patient Progress: improving

## 2024-08-02 NOTE — PHARMACY-ADMISSION MEDICATION HISTORY
Pharmacist Admission Medication History    Admission medication history is complete. The information provided in this note is only as accurate as the sources available at the time of the update.    Information Source(s): Discharge summary from 7/26. Pt was then readmitted 3 days later.      Medication History Completed By: Jane Mahan [facility] 8/1/2024 8:47 PM    Prior to Admission medications    Medication Sig Last Dose Taking? Auth Provider Long Term End Date   acetaminophen (TYLENOL) 325 MG tablet Take 2 tablets (650 mg) by mouth every 6 hours as needed for mild pain   Dany Perez MD No    albuterol (PROAIR HFA/PROVENTIL HFA/VENTOLIN HFA) 108 (90 Base) MCG/ACT inhaler Inhale 1-2 puffs into the lungs every 4 hours as needed for shortness of breath   Reported, Patient     apixaban ANTICOAGULANT (ELIQUIS) 2.5 MG tablet Take 1 tablet (2.5 mg) by mouth 2 times daily   Anatoly Avina MD No    atenolol (TENORMIN) 100 MG tablet TAKE 1 TABLET(100 MG) BY MOUTH DAILY FOR HIGH BLOOD PRESSURE   Reported, Patient Yes    benztropine (COGENTIN) 1 MG tablet Take 1 tablet by mouth daily   Reported, Patient Yes    calcium Citrate-vitamin D 500-400 MG-UNIT CHEW Take 1 tablet by mouth daily   Reported, Patient     childrens multivitamin w/iron (FLINTSTONES COMPLETE) 60 MG chewable tablet Take 2 tablets by mouth daily   Reported, Patient     cholecalciferol 125 MCG (5000 UT) CAPS Take 5000 mg 4 times a week   Reported, Patient     cyanocobalamin (CYANOCOBALAMIN) 1000 MCG/ML injection Inject 1 mL (1,000 mcg) into a muscle every month.   Reported, Patient     diclofenac (VOLTAREN) 1 % topical gel Apply to: Skin on  Both/All Knee for pain. Topical 1% gel, 4 g applied TOPICALLY to lower extremities 3 times daily PRN ;   Reported, Patient     ferrous sulfate (CASSANDRA-IN-SOL) 75 (15 FE) MG/ML oral drops Take 15 mg by mouth daily   Reported, Patient     furosemide (LASIX) 40 MG tablet Take 40 mg by mouth daily   Reported, Patient Yes     gabapentin (NEURONTIN) 800 MG tablet Take 2 tablets by mouth 2 times daily   Reported, Patient Yes    hydrocortisone 2.5 % cream Apply topically as needed   Reported, Patient     lidocaine (XYLOCAINE) 5 % external ointment Apply 1 Application topically as needed   Reported, Patient     methocarbamol (ROBAXIN) 500 MG tablet Take 1 tablet (500 mg) by mouth every 6 hours as needed for muscle spasms or other (abdominal pain/soreness)   Kelsey Mccracken APRN CNP     naloxone (NARCAN) 4 MG/0.1ML nasal spray Spray 1 spray (4 mg) into one nostril alternating nostrils as needed for opioid reversal every 2-3 minutes until assistance arrives   Gaurang Guajardo MD Yes    oxyCODONE (ROXICODONE) 5 MG tablet Take 1 tablet (5 mg) by mouth every 4 hours as needed for breakthrough pain (pain control or improvement in physical function. Hold dose for analgesic side effects.)   Kelsey Mccracken APRN CNP No    phenazopyridine (PYRIDIUM) 100 MG tablet Take 1 tablet (100 mg) by mouth 3 times daily as needed for urinary tract discomfort   Dany Perez MD     polyethylene glycol (MIRALAX) 17 GM/Dose powder Take 17 g by mouth 2 times daily as needed for constipation   Kelsey Mccracken APRN CNP     QUEtiapine (SEROQUEL) 400 MG tablet Take 400 mg by mouth at bedtime   Reported, Patient Yes    senna-docusate (SENOKOT-S/PERICOLACE) 8.6-50 MG tablet Take 1 tablet by mouth 2 times daily   Anatoly Avina MD     sertraline (ZOLOFT) 100 MG tablet Take 1.5 tablets by mouth daily   Reported, Patient     sulfamethoxazole-trimethoprim (BACTRIM DS) 800-160 MG tablet Take 1 tablet by mouth 2 times daily   Kelsey Mccracken APRN CNP     tiZANidine (ZANAFLEX) 4 MG tablet Take 4 mg by mouth 3 times daily as needed   Reported, Patient     zolpidem (AMBIEN) 10 MG tablet Take 10 mg by mouth nightly as needed   Reported, Patient

## 2024-08-02 NOTE — PROGRESS NOTES
"Brief Gyn Onc Progress Note    At bedside for evening check-in and also to discuss medication concerns in the context of current readmission from TCU with sedation and tardive dyskinesia thought to be due to polypharmacy (high dose anti-dopaminergic agent plus multiple anti-cholinergic agents; now dose-decreased and/or discontinued).     Patient states that she is feeling fine overall, much better than a few days ago, and denies pain. When asked about specific medications she does or does not want to take, she states that she is concerned that Seroquel and Zoloft because, \"they make me hallucinate.\"    On-call Gyn Onc G4 called patient's daughter Joy for further discussion of medication-related concerns. She conveyed her hope that her mother's wishes be respected regarding medications that she does or does not want to take. Daughter feels strongly that it is important that her mother knows what she taking; I affirmed the shared importance of these values by our team. Specifically, Joy requests discontinuation of Seroquel and Zoloft (which she states that her mother has been on and off over the years with the guidance of her primary psychiatrist but has not taken at home recently) and gabapentin (which is currently scheduled here in the hospital but has reportedly been an as-needed medication in the past that her mother did not like the way she felt when she took it). I affirmed the importance of Joy's concerns, including avoiding oversedation with administration of too high of doses of medication and/or too many medications that interact with each other, but also balancing such concerns with the potential dangers of sudden discontinuation of mood medications.    Thus, plan made overnight to hold gabapentin as the dose is currently relatively low and the patient's pain is currently well controlled. However, will maintain orders for Seroquel and Zoloft pending further discussion with the Psychiatry team " (plan to re-contact 8/2 AM). Also spoke with bedside RN to reiterate patient/family concerns about medications. RN to make sure the patient understands what medications are being offered to her before taking them, and she, of course, has the right to decline anything she doesn't feel comfortable taking at any time.    Anali Rodríguez MD, PGY-4  9:20 PM  [facility] Obstetrics & Gynecology Residency

## 2024-08-02 NOTE — PROGRESS NOTES
GYN ONC PROGRESS NOTE  08/02/2024    HD#4 new neurologic symptoms (confusion, dysarthria/dyskinesia)  Disease: Serous uterine carcinoma s/p XL, SNEHA, BSO, EDIE and cystotomy repair with suprapubic bladder insertion and omental flap on 7/12/24. TINY drain in place. Hx REMA-III s/p laser tx. Remote hx cervical cancer s/p radiation.    24 hour events:   - restarted Lasix at 20mg daily   - patient/daughter shared concerns about gabapentin, Seroquel, and sertraline> held gabapentin, plan further discussion w/ Pscyh re Seroquel and sertraline    SUBJECTIVE:   Patient is feeling ok this morning. Did not sleep well last night. Discussed that there are available scheduled and PRN medications available to assist with this that she did decline last evening. Patient expressed understanding that there will be ongoing interdisciplinary discussion of her psychiatric medications.    OBJECTIVE:   Patient Vitals for the past 24 hrs:   BP Temp Temp src Pulse Resp SpO2 Weight   08/01/24 2048 91/56 97.9  F (36.6  C) Oral 59 16 100 % --   08/01/24 1611 110/57 98.1  F (36.7  C) Oral 63 18 -- --   08/01/24 1231 90/52 98.2  F (36.8  C) Oral 80 18 100 % --   08/01/24 0936 -- -- -- -- -- -- 78.9 kg (173 lb 15.1 oz)   08/01/24 0850 94/48 -- -- -- -- -- --   08/01/24 0847 (!) 84/71 -- -- -- -- -- --   08/01/24 0845 (!) 89/58 98.3  F (36.8  C) Oral 56 18 100 % --   08/01/24 0426 107/60 97.8  F (36.6  C) Oral 53 17 100 % --   08/01/24 0054 106/63 98.6  F (37  C) Oral 55 16 100 % --     Constitutional: chronically ill-appearing female in no acute distress, resting in bed  Cardiovascular: Appears well perfused  Respiratory: Normal work of breathing on room air  Gastrointestinal: Abdomen non-distended. Suprapubic catheter and TINY drain sites are C/D/I. Small amount of serous drainage present in TNIY drain bulb.  : Clear, dark yellow urine present in both urinary kent and suprapubic catheter drainage bags.  Neuro: Alert and interactive. Speech is clear  this morning. Tremulous versus jerking motions of bilateral distal upper extremities are no longer seen.  Extremities: BLE without significant appreciable edema.     New Labs/Imaging-   Results for orders placed or performed during the hospital encounter of 07/29/24 (from the past 24 hour(s))   CBC with platelets   Result Value Ref Range    WBC Count 5.8 4.0 - 11.0 10e3/uL    RBC Count 3.41 (L) 3.80 - 5.20 10e6/uL    Hemoglobin 8.6 (L) 11.7 - 15.7 g/dL    Hematocrit 27.3 (L) 35.0 - 47.0 %    MCV 80 78 - 100 fL    MCH 25.2 (L) 26.5 - 33.0 pg    MCHC 31.5 31.5 - 36.5 g/dL    RDW 26.0 (H) 10.0 - 15.0 %    Platelet Count 248 150 - 450 10e3/uL   Comprehensive metabolic panel   Result Value Ref Range    Sodium 142 135 - 145 mmol/L    Potassium 4.6 3.4 - 5.3 mmol/L    Carbon Dioxide (CO2) 20 (L) 22 - 29 mmol/L    Anion Gap 7 7 - 15 mmol/L    Urea Nitrogen 10.9 8.0 - 23.0 mg/dL    Creatinine 1.19 (H) 0.51 - 0.95 mg/dL    GFR Estimate 49 (L) >60 mL/min/1.73m2    Calcium 8.2 (L) 8.8 - 10.4 mg/dL    Chloride 115 (H) 98 - 107 mmol/L    Glucose 71 70 - 99 mg/dL    Alkaline Phosphatase 53 40 - 150 U/L    AST 9 0 - 45 U/L    ALT <5 0 - 50 U/L    Protein Total 5.0 (L) 6.4 - 8.3 g/dL    Albumin 2.1 (L) 3.5 - 5.2 g/dL    Bilirubin Total 0.2 <=1.2 mg/dL       Pending cultures (date obtained):   - Blood culture (7/29): NG x3D  - Urine culture (7/29): negative        ASSESSMENT:   69 year old female with serous uterine carcinoma s/p recent XL, SNEHA, BSO, EDIE with Dr. Perez c/b cystotomy on 7/12/2024 with suprapubic and urethral catheters in place. Also underwent laser treatment for REMA-III in 2021 and remote pelvic radiation for cervical cancer. Readmitted to Gynecologic Oncology service 7/21/2024 for management of SBO and UTI, discharged to TCU 7/26/2024. Now re-presenting to ED for evaluation of new/progressive neurological symptoms including confusion and dysarthria over the past two days. Also new dyskinetic movements appreciated on  "Gynecologic Oncology team evaluation that are new since recent admissions. Per Neurology consultation, \"Overall impression at this time is that this patient is experiencing medication toxicity in the setting of multiple CNS medications,\" exacerbated by worsening renal function. Symptoms gradually improving with discontinuation of suspected anti-dopaminergic and anti-cholinergic agents; restarting indicated medications under guidance of Neurology and Psychiatry consultants. See italicized text for current regimen. Nearing medical stability for discharge, awaiting TCU placement. Plan discussion with Psychiatry regarding patient/family request to stop mood medications.    # Confusion  # Dysarthria  # Dyskinesia  - Appreciate Neurology, Psychiatry recommendations:  - Initially held all medications that could be contributing to dystonic reaction, including: Seroquel, Tizanidine, Robaxin, Ambien, Cogentin, oxybutynin, oxycodone, gabapentin   - Gradually restarting scheduled Seroquel, gabapentin with appropriate PRNs available    - Gabapentin held per patient/family request  - Scheduled melatonin at bedtime per Neurology, delirium precautions  - Current scheduled medications: Seroquel 200 mg at bedtime, melatonin 1 mg at bedtime   - Current PRN medications: Cogentin 1 mg daily as needed for extrapyramidal symptoms, Ambien 10 mg at bedtime as needed for insomnia, oxycodone 5 mg f8mmvkg as needed for pain     # Schizophrenia  # Depression  - PTA Zoloft 150 mg continued  - PTA Seroquel, cogentin, vistaril, and Ambien; appreciate Neurology and Psychiatry recommendations regarding temporary vs permanent discontinuation of these agents with new neurological symptoms (see above)  - Unclear outpatient compliance, recent TCU administration of above agents; worked to clarify (see separate progress notes from 7/30 detailing conversations with TCU staff, patient's family members)     # Intermittent hypotension, improved  # " Supplemental O2 requirement, now weaned  - S/p NS bolus overnight HD#1-2 (total of 1L since presentation to ED)  - Although no clear localizing chest symptoms, consider chest imaging to evaluate for pneumonia if vital sign changes recur    # Recent UTI, negative urine culture (7/29)  # Recent superficial SSI  - s/p Zosyn x1 in ED 7/29  - completed Bactrim/Flagyl course on 7/31  - f/up blood cultures collected in ED (NGTD)     # Recent intraoperative cystotomy  # Suprapubic catheter, urethral kent catheter in place  - Outpatient visit with Urology scheduled 8/7/2024; contact if still inpatient at that time  - Recent ~14d course of oxybutynin, held/will not resume given neurologic symptoms  - Pyridium prn inially available, held as she had not required this     # Cr elevation, improving  # Possible underlying CKD  - Continue to trend serum Cr, UOP  - Suspect Cr elevation constitutes prerenal MADHU d/t dehydration with disorientation  - If persistent c/f MADHU, further laboratory/imaging workup as indicated  - Consider Urology and/or Nephrology involvement pending clinical course     # Anemia  - Goal Hgb >7, continue to trend  - received 1u pRBCs 7/31 with appropriate Hgb rise    # Perianal skin breakdown  - WOC following     # Chronic pain  # Osteoarthritis  - Scheduled tylenol  - PTA initially held gabapentin held given neurologic symptoms, restarted per Neurology/Psychiatry guidance   - PTA dose = 1600 mg per day, currently on 300 mg total, so could up-titrate as needed  - PTA prn oxycodone also initially held but now restarted; lidocaine patches prn also available     # Hypertension  - PTA lasix held initially given Cr elevation, PTA Lasix 20 mg daily resumed 8/1     # Atrial fibrillation  - Patient currently in sinus bradycardia  - Continue PTA atenolol   - Dose reduced from 100 to 25 mg daily given persistent hypotension and bradycardia, now improved     # History of gastric bypass  - Avoid NSAIDs     #  Constipation  - Scheduled Miralax     # Serous uterine carcinoma, s/p SNEHA/BSO  # History of REMA-III, s/p laser therapy  # History of cevical cancer, s/p radiation therapy  - Plan to follow-up outpatient in clinic with primary oncologist Dr. Perez for long term treatment planning     # Inpatient status  - PPX: PTA abixaban (on 28d post-op course to be completed 8/9), SCDs, IS; PPI now added for prolonged admission  - PT/OT consults ordered for deconditioning, dispo recommendations  - SW consult placed for dispo planning; TCU placement pending  - Pharmacy consult placed for reconciliation after presentation with polypharmacy     Anali Rodríguez MD, PGY-4  6:26 AM  [facility] Obstetrics & Gynecology Residency    Gyn Onc Pager: [phone]     The patient was seen and examined with the resident, the labs and vital signs were reviewed. The medical care plan outlined above was provided under my directives and direct supervision.     Yeimy Muniz MD, MPH  Gynecologic Oncology

## 2024-08-02 NOTE — PLAN OF CARE
Goal Outcome Evaluation:  Cared for pt from 0300 - 0700   A&Ox4. Denied auditory and visual hallucinations. Contin pulse ox. Bradycardic. Soft Bps. RA. Assist x 1 w/ GB and walker. Not OOB. Reg diet. Denied nausea. Denied pain. Suprapubic catheter. Kent catheter. Urine output from both Suprapubic and kent cath. Sacral wound care BID. RLQ TINY x 1.  PIV x 1 - SL.     Plan of Care Reviewed With: patient    Overall Patient Progress: no changeOverall Patient Progress: no change

## 2024-08-02 NOTE — PROVIDER NOTIFICATION
Time of notification: 8:20 PM  Provider notified:  Patient status:  Pt daughter would like to speak with you pertaining her mom's medications ( gabapentin, Seroquel and sertraline) she's available by cell on [phone] at anytime   Temp:  [97.8  F (36.6  C)-98.6  F (37  C)] 98.1  F (36.7  C)  Pulse:  [51-80] 63  Resp:  [15-18] 18  BP: ()/(48-71) 110/57  SpO2:  [100 %] 100 %  Orders received:

## 2024-08-02 NOTE — PROGRESS NOTES
Care Management Follow Up    Length of Stay (days): 3    Expected Discharge Date: 08/02/2024     Concerns to be Addressed: discharge planning     Patient plan of care discussed at interdisciplinary rounds: Yes    Anticipated Discharge Disposition: Skilled Nursing Facility, Transitional Care     Anticipated Discharge Services:  therapy services  Anticipated Discharge DME: None    Patient/family educated on Medicare website which has current facility and service quality ratings: yes  Education Provided on the Discharge Plan: Yes  Patient/Family in Agreement with the Plan: yes    Referrals Placed by CM/SW: Post Acute Facilities  Private pay costs discussed: Not applicable    Additional Information:    CHW called Aranza Hamilton's daughter, this morning to update her about her mother's inactive insurance. She said that she will help her mom call the [facility] todayand that she will be coming to visit the hospital to do that with her. She shared that she will make sure to keep SW/CHW up to date on her mother's insurance progress. CHW will continue to follow up with Joy.       Jeanette De Santiago  5A/5C Community Health Worker  [place], [place]  [email or web address]  [phone]

## 2024-08-02 NOTE — PROGRESS NOTES
Status: serous uterine carcinoma who recently underwent exploratory laparotomy, total abdominal hysterectomy, bilateral salpingo-oophorectomy, and lysis of adhesions on 7/12/24. Course was complicated by small bowel obstruction and UTI, now treated and resolved.   NEURO: A&Ox4, pleasant/cooperative, able to make needs known  RESPIRATORY: WNL on RA, denies SOB  CARDIAC: WNL, intermittent bradycardia mid-50s at baseline, denies chest pain  GI/: Kent and suprapubic catheters in place, AUOP, output from suprapubic>kent, large/formed BMx1 this shift, denies nausea, denies abd discomfort  SKIN: New interdry to abdominal fold skin tear today, sacral wound cares done x1 this shift- ordered BID, RLQ TINY to bulb suction w scant serous output,+2 BLE edema  DIET: Regular, assistance w/ ordering and tray setup provided  PAIN/NAUSEA: Denies this shift  IV ACCESS: R-PIV SL  ACTIVITY: Assist x1 w/ gait belt & walker  LAB: Reviewed  PLAN: Continue w/ POC, plan to discharge to TCU when placement found

## 2024-08-03 LAB
ANION GAP SERPL CALCULATED.3IONS-SCNC: 7 MMOL/L (ref 7–15)
BACTERIA BLD CULT: NO GROWTH
BUN SERPL-MCNC: 9.9 MG/DL (ref 8–23)
CALCIUM SERPL-MCNC: 8.1 MG/DL (ref 8.8–10.4)
CHLORIDE SERPL-SCNC: 112 MMOL/L (ref 98–107)
CREAT SERPL-MCNC: 1.09 MG/DL (ref 0.51–0.95)
EGFRCR SERPLBLD CKD-EPI 2021: 54 ML/MIN/1.73M2
GLUCOSE SERPL-MCNC: 89 MG/DL (ref 70–99)
HCO3 SERPL-SCNC: 18 MMOL/L (ref 22–29)
POTASSIUM SERPL-SCNC: 4.7 MMOL/L (ref 3.4–5.3)
SODIUM SERPL-SCNC: 137 MMOL/L (ref 135–145)

## 2024-08-03 PROCEDURE — 99232 SBSQ HOSP IP/OBS MODERATE 35: CPT | Mod: 24 | Performed by: OBSTETRICS & GYNECOLOGY

## 2024-08-03 PROCEDURE — 250N000013 HC RX MED GY IP 250 OP 250 PS 637

## 2024-08-03 PROCEDURE — 120N000002 HC R&B MED SURG/OB UMMC

## 2024-08-03 PROCEDURE — 36415 COLL VENOUS BLD VENIPUNCTURE: CPT

## 2024-08-03 PROCEDURE — 80048 BASIC METABOLIC PNL TOTAL CA: CPT

## 2024-08-03 RX ADMIN — ACETAMINOPHEN 650 MG: 325 TABLET, FILM COATED ORAL at 08:46

## 2024-08-03 RX ADMIN — Medication 1 MG: at 21:37

## 2024-08-03 RX ADMIN — POLYETHYLENE GLYCOL 3350 17 G: 17 POWDER, FOR SOLUTION ORAL at 08:46

## 2024-08-03 RX ADMIN — APIXABAN 2.5 MG: 2.5 TABLET, FILM COATED ORAL at 19:45

## 2024-08-03 RX ADMIN — OXYCODONE HYDROCHLORIDE 5 MG: 5 TABLET ORAL at 21:43

## 2024-08-03 RX ADMIN — OXYCODONE HYDROCHLORIDE 5 MG: 5 TABLET ORAL at 08:54

## 2024-08-03 RX ADMIN — PANTOPRAZOLE SODIUM 40 MG: 40 TABLET, DELAYED RELEASE ORAL at 08:46

## 2024-08-03 RX ADMIN — APIXABAN 2.5 MG: 2.5 TABLET, FILM COATED ORAL at 08:46

## 2024-08-03 RX ADMIN — ATENOLOL 25 MG: 25 TABLET ORAL at 08:46

## 2024-08-03 ASSESSMENT — ACTIVITIES OF DAILY LIVING (ADL)
ADLS_ACUITY_SCORE: 43
ADLS_ACUITY_SCORE: 41
ADLS_ACUITY_SCORE: 43
ADLS_ACUITY_SCORE: 41
ADLS_ACUITY_SCORE: 41
ADLS_ACUITY_SCORE: 43
ADLS_ACUITY_SCORE: 41
ADLS_ACUITY_SCORE: 41
ADLS_ACUITY_SCORE: 43
ADLS_ACUITY_SCORE: 41
ADLS_ACUITY_SCORE: 41
ADLS_ACUITY_SCORE: 43
ADLS_ACUITY_SCORE: 41
ADLS_ACUITY_SCORE: 43
ADLS_ACUITY_SCORE: 43
ADLS_ACUITY_SCORE: 41
ADLS_ACUITY_SCORE: 43
ADLS_ACUITY_SCORE: 41
ADLS_ACUITY_SCORE: 41

## 2024-08-03 NOTE — PROGRESS NOTES
GYN ONC PROGRESS NOTE  08/03/24    HD#5 new neurologic symptoms (confusion, dysarthria/dyskinesia)  Disease: Serous uterine carcinoma s/p XL, SNEHA, BSO, EDIE and cystotomy repair with suprapubic bladder insertion and omental flap on 7/12/24. TINY drain in place. Hx REMA-III s/p laser tx. Remote hx cervical cancer s/p radiation.    24 hour events:   - Cr bump to 1.37- held lasix and 500mL LR bolus > Cr 1.05 today   - Psych re-consulted: discontinue Zoloft and Seroquel per patient and daughter preference, monitor for recurrence of paranoia, follow-up with outpatient psych.  - Joy (daughter) brought insurance forms to [facility] office 8/2    SUBJECTIVE:   Feeling well this AM. Slept much better overnight last night. No new concerns this AM. No pain at sites of drain/catheters. Feels that mentation is back to baseline.     OBJECTIVE:   Patient Vitals for the past 24 hrs:   BP Temp Temp src Pulse Resp SpO2 Weight   08/03/24 0434 108/66 -- -- -- -- -- --   08/03/24 0432 -- 98.5  F (36.9  C) Oral 58 -- 100 % --   08/02/24 2341 92/66 98.4  F (36.9  C) Oral -- -- 100 % --   08/02/24 1558 109/56 97.9  F (36.6  C) Oral 62 18 96 % --   08/02/24 0833 (!) 88/58 98  F (36.7  C) Oral 58 14 99 % 68.6 kg (151 lb 3.2 oz)     Constitutional: chronically ill-appearing female in no acute distress, resting in bed  Cardiovascular: Appears well perfused  Respiratory: Normal work of breathing on room air  Gastrointestinal: Abdomen non-distended. Suprapubic catheter and TINY drain sites are C/D/I. Small amount of serous drainage present in TINY drain bulb.  : Clear, dark yellow urine present in both urinary kent and suprapubic catheter drainage bags.  Neuro: Alert and interactive. Speech is clear this morning. No dyskinetic movement on exam   Extremities: BLE without significant appreciable edema.     New Labs/Imaging-   Results for orders placed or performed during the hospital encounter of 07/29/24 (from the past 24 hour(s))   Basic metabolic  "panel   Result Value Ref Range    Sodium 137 135 - 145 mmol/L    Potassium 4.7 3.4 - 5.3 mmol/L    Chloride 112 (H) 98 - 107 mmol/L    Carbon Dioxide (CO2) 18 (L) 22 - 29 mmol/L    Anion Gap 7 7 - 15 mmol/L    Urea Nitrogen 9.9 8.0 - 23.0 mg/dL    Creatinine 1.09 (H) 0.51 - 0.95 mg/dL    GFR Estimate 54 (L) >60 mL/min/1.73m2    Calcium 8.1 (L) 8.8 - 10.4 mg/dL    Glucose 89 70 - 99 mg/dL       Pending cultures (date obtained):   - Blood culture (7/29): NG x3D  - Urine culture (7/29): negative        ASSESSMENT:   69 year old female with serous uterine carcinoma s/p recent XL, SNEHA, BSO, EDIE with Dr. Perez c/b cystotomy on 7/12/2024 with suprapubic and urethral catheters in place. Also underwent laser treatment for REMA-III in 2021 and remote pelvic radiation for cervical cancer. Readmitted to Gynecologic Oncology service 7/21/2024 for management of SBO and UTI, discharged to TCU 7/26/2024. Now re-presenting to ED for evaluation of new/progressive neurological symptoms including confusion and dysarthria over the past two days. Also new dyskinetic movements appreciated on Gynecologic Oncology team evaluation that are new since recent admissions. Per Neurology consultation, \"Overall impression at this time is that this patient is experiencing medication toxicity in the setting of multiple CNS medications,\" exacerbated by worsening renal function. Symptoms gradually improving with discontinuation of suspected anti-dopaminergic and anti-cholinergic agents; restarting indicated medications under guidance of Neurology and Psychiatry consultants. After further discussion with psychiatry yesterday, 8/2, patient and daughter ultimately elected to hold. Remains inpatient while awaiting TCU placement, no concerns for schizophrenia exacerbation on exam this AM.     # Confusion  # Dysarthria  # Dyskinesia  - Appreciate Neurology, Psychiatry recommendations:  - Initially held all medications that could be contributing to dystonic " reaction, including: Seroquel, Tizanidine, Robaxin, Ambien, Cogentin, oxybutynin, oxycodone, gabapentin   - Gradually restarting scheduled Seroquel, gabapentin with appropriate PRNs available    - Gabapentin held per patient/family request  - Scheduled melatonin at bedtime per Neurology, delirium precautions     # Schizophrenia  # Depression  - PTA Zoloft 150 mg, seroquel, gabapentin discontinued   - Will continue to monitor patient for changes in mentation, evidence of response to internal stimuli, or paranoia   - PTA vistaril, and Ambien PRN   - Unclear outpatient compliance, recent TCU administration of above agents; worked to clarify (see separate progress notes from 7/30 detailing conversations with TCU staff, patient's family members)     # Intermittent hypotension, improved  # Supplemental O2 requirement, now weaned  - S/p NS bolus overnight HD#1-2 (total of 1L since presentation to ED)  - Although no clear localizing chest symptoms, consider chest imaging to evaluate for pneumonia if vital sign changes recur    # Recent UTI, negative urine culture (7/29)  # Recent superficial SSI  - s/p Zosyn x1 in ED 7/29  - completed Bactrim/Flagyl course on 7/31  - f/up blood cultures collected in ED (NGTD)     # Recent intraoperative cystotomy  # Suprapubic catheter, urethral kent catheter in place  - Outpatient visit with Urology scheduled 8/7/2024; contact if still inpatient at that time  - Recent ~14d course of oxybutynin, held/will not resume given neurologic symptoms  - Pyridium prn inially available, held as she had not required this     # Cr elevation  # Possible underlying CKD  - Cr bump to 1.37 8/2, lasix discontinued   - Continue to trend serum Cr, UOP  - Suspect Cr elevation constitutes prerenal MADHU d/t dehydration with disorientation  - If persistent c/f MADHU, further laboratory/imaging workup as indicated  - Consider Urology and/or Nephrology involvement pending clinical course     # Anemia  - Goal Hgb >7,  continue to trend  - received 1u pRBCs 7/31 with appropriate Hgb rise    # Perianal skin breakdown  - WOC following     # Chronic pain  # Osteoarthritis  - Scheduled tylenol  - PTA initially held gabapentin held given neurologic symptoms, restarted per Neurology/Psychiatry guidance   - PTA dose = 1600 mg per day, currently on 300 mg total, so could up-titrate as needed  - PTA prn oxycodone also initially held but now restarted; lidocaine patches prn also available     # Hypertension  - PTA lasix held as above given Cr elevation      # Atrial fibrillation  - Patient currently in sinus bradycardia  - Continue PTA atenolol   - Dose reduced from 100 to 25 mg daily given persistent hypotension and bradycardia, now improved     # History of gastric bypass  - Avoid NSAIDs     # Constipation  - Scheduled Miralax     # Serous uterine carcinoma, s/p SNEHA/BSO  # History of REMA-III, s/p laser therapy  # History of cevical cancer, s/p radiation therapy  - Plan to follow-up outpatient in clinic with primary oncologist Dr. Perez for long term treatment planning     # Inpatient status  - PPX: PTA abixaban (on 28d post-op course to be completed 8/9), SCDs, IS; PPI now added for prolonged admission  - PT/OT consults ordered for deconditioning, dispo recommendations  - SW consult placed for dispo planning; TCU placement pending  - Pharmacy consult placed for reconciliation after presentation with polypharmacy     Robert Coulter MD   [employer] [place] [employer]   Gynecologic Oncology, PGY-2  Gyn Onc Pager: [phone]    The patient was seen and examined with the resident, the labs and vital signs were reviewed. The medical care plan outlined above was provided under my directives and direct supervision.     Yeimy Muniz MD, MPH  Gynecologic Oncology

## 2024-08-03 NOTE — CONSULTS
Care Management Follow Up    Length of Stay (days): 4    Expected Discharge Date: 08/05/2024     Concerns to be Addressed: discharge planning (advanced care directive)     Patient plan of care discussed at interdisciplinary rounds: Yes    Anticipated Discharge Disposition: Skilled Nursing Facility, Transitional Care     Anticipated Discharge Services:  None   Anticipated Discharge DME: None    Patient/family educated on Medicare website which has current facility and service quality ratings: yes  Education Provided on the Discharge Plan: Yes  Patient/Family in Agreement with the Plan: yes    Referrals Placed by CM/SW: Post Acute Facilities  Private pay costs discussed: Not applicable    Additional Information:Social work received consult for Advanced Care Planning Document. Social work met with the patient at bedside to discuss advanced care planning. SW discussed the purpose of an advanced care directive and provided patient with a blank Health Care Directive. Writer explained the form. Writer offered to arrange to have a notary come during the week (Monday-Friday) to notarize the document if the patient completes it while she is in the hospital.     WILLIAM Lopez, LGSW   8/3/2024       Social Work and Care Management Department       SEARCHABLE in [facility] - search SOCIAL WORK       [place] (0800 - 1630) Saturday and Sunday     Units: 4A Vocera, 4C Vocera, & 4E Vocera        Units: 5A 0192-1398 Vocera, 5A 4138-1045 Vocera , BMT SW 1 BMT SW 2, BMT SW 3 & BMT SW 4  5C Off Service 5401 - 5416  5C Off Service [phone]     Units: 6A Vocera & 6B Vocera      Units: 6C Vocera     Units: 7A Vocera & 7B Vocera      Units: 7C Med Surg 7401 thru 7418 and 7C Med Surg 7502 thru 7521      Unit: [place] ED Vocera & [place] Obs Vocera     [facility] ([phone]) Saturday and Sunday      Units: 5 Ortho Vocera, 5 Med Surg Vocera & WB ED Vocera     Units: 6 Med Surg Vocera, 8 Med Surg Vocera, & 10 ICU Vocera       After hours Vocera [place] and After Hours Vocera [place]     Saturday & Sunday (1630 - 0000)    Mon-Fri (0693-1795)     [facility] Recognized Holidays  ([id number])    Units: ALL   - see above VOCERA links to units and after hours

## 2024-08-03 NOTE — PLAN OF CARE
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 Course was complicated by small bowel obstruction and UTI, now treated and resolved.   NEURO: A&Ox4, pleasant/cooperative, able to make needs known  RESPIRATORY: WNL on RA, denies SOB  CARDIAC: WNL, intermittent bradycardia mid-50s at baseline, denies chest pain  GI/: Kent and suprapubic catheters in place, AUOP, output from suprapubic>kent, large/formed BMx1 this shift, denies nausea, denies abd discomfort  SKIN: New interdry to abdominal fold skin tear today, sacral wound cares done x1 this shift- ordered BID, RLQ TINY to bulb suction w scant serous output,+2 BLE edema  DIET: Regular, assistance w/ ordering and tray setup provided  PAIN/NAUSEA: Denies this shift  IV ACCESS: R-PIV SL  ACTIVITY: Assist x1 w/ gait belt & walker  LAB: Reviewed  PLAN: Continue w/ POC, plan to discharge to TCU when placement found

## 2024-08-03 NOTE — PROGRESS NOTES
Care Management Follow Up    Length of Stay (days): 4    Expected Discharge Date: 08/05/2024     Concerns to be Addressed: discharge planning     Patient plan of care discussed at interdisciplinary rounds: Yes    Anticipated Discharge Disposition: Skilled Nursing Facility, Transitional Care     Anticipated Discharge Services: None   Anticipated Discharge DME: None    Patient/family educated on Medicare website which has current facility and service quality ratings: yes  Education Provided on the Discharge Plan: Yes  Patient/Family in Agreement with the Plan: yes    Referrals Placed by CM/SW: Post Acute Facilities  Private pay costs discussed: Not applicable    Additional Information: Social work called and spoke with the patient's daughter Joy today at 12:50 pm regarding the patient's insurance expiring. Joy indicated she went to the [facility] yesterday and there was issues renewing the patient's insurance. Joy indicates she plans to go back on Monday to the [facility] to submit the renewal paperwork.     Social work sent finanicall counseling referral via [employer] today to help assist the patient's daughter. TCU follow up and additional referrals are pending an approved payer source.    Yolanda Rivas, WILLIAM, LGSW   8/3/2024       Social Work and Care Management Department       SEARCHABLE in [employer] - search SOCIAL WORK       [place] (0800 - 1630) Saturday and Sunday     Units: 4A Vocera, 4C Vocera, & 4E Vocera        Units: 5A 2635-1280 Vocera, 5A 1236-6767 Vocera , BMT SW 1 BMT SW 2, BMT SW 3 & BMT SW 4  5C Off Service 5401 - 5416  5C Off Service 1240-5712     Units: 6A Vocera & 6B Vocera      Units: 6C Vocera     Units: 7A Vocera & 7B Vocera      Units: 7C Med Surg 7401 thru 7418 and 7C Med Surg 7502 thru 7521      Unit: [place] ED Vocera & [place] Obs Vocera     [facility] ([phone]) Saturday and Sunday      Units: 5 Ortho Vocera, 5 Med Surg Vocera & WB ED Vocera     Units: 6 Med Surg  Vocera, 8 Med Surg Vocera, & 10 ICU Vocera      After hours Vocera [place] and After Hours Vocera [place]     Saturday & Sunday (1630 - 0000)    Mon-Fri (3169-0261)     [facility] Recognized Holidays  ([id number])    Units: ALL   - see above VOCERA links to units and after hours

## 2024-08-03 NOTE — PLAN OF CARE
Goal Outcome Evaluation:      Plan of Care Reviewed With: patient    Overall Patient Progress: no changeOverall Patient Progress: no change    Outcome Evaluation: 4083-1025    HD#4. Soft Bps. Asymptomatic. Other VSS. Pain 6-8 to abdomen. Pt declining scheduled tylenol. PRN oxycodone given x1 with some relief. Pt has been agreeable to repositioning. Barrier cream applied this evening to buttock wound. Interdry used to pannus skinfold incision. Compression stockings placed on Pt this evening. Denies nausea/vomiting or SOB. Up 1, GB and walker. Poor PO intake. LBM 8/2. Shahid and suprapubic catheter remain in place with adequate output. TINY drain with minimal output. Alert and oriented, no delirium or confusion. No acute events this shift. Continue with POC.

## 2024-08-03 NOTE — PLAN OF CARE
Goal Outcome Evaluation:      Plan of Care Reviewed With: patient    Overall Patient Progress: no changeOverall Patient Progress: no change    VSS on RA. Denies pain, n/v, SOB. Shahid & Suprapubic catheter in place, AUOP overnight. TINY to bulb suction with minimal output. PIV saline locked. Regular diet. A1 when out of bed. Continue with POC.

## 2024-08-03 NOTE — PROGRESS NOTES
Brief Interval Progress Notes     MD to bedside for PM rounds. Reports feeling overall well, no new concerns. Worked with PT today and feels that she is getting stronger. On exam mentation at baseline, not exhibiting evidence of responding to internal stimuli or paranoia. Will continue to hold psych meds per patient and daughter request. Dispo pending TCU placement.       Robert Coulter MD   [facility] [employer]   Gynecologic Oncology, PGY-2  Gyn Onc Pager: [phone]

## 2024-08-04 ENCOUNTER — APPOINTMENT (OUTPATIENT)
Dept: PHYSICAL THERAPY | Facility: CLINIC | Age: 69
DRG: 091 | End: 2024-08-04
Payer: COMMERCIAL

## 2024-08-04 LAB
ANION GAP SERPL CALCULATED.3IONS-SCNC: 5 MMOL/L (ref 7–15)
BUN SERPL-MCNC: 11.5 MG/DL (ref 8–23)
CALCIUM SERPL-MCNC: 8.1 MG/DL (ref 8.8–10.4)
CHLORIDE SERPL-SCNC: 113 MMOL/L (ref 98–107)
CREAT FLD-MCNC: 1.3 MG/DL
CREAT SERPL-MCNC: 0.99 MG/DL (ref 0.51–0.95)
CREATININE BODY FLUID SOURCE: NORMAL
EGFRCR SERPLBLD CKD-EPI 2021: 61 ML/MIN/1.73M2
GLUCOSE SERPL-MCNC: 99 MG/DL (ref 70–99)
HCO3 SERPL-SCNC: 20 MMOL/L (ref 22–29)
HOLD SPECIMEN: NORMAL
POTASSIUM SERPL-SCNC: 4.5 MMOL/L (ref 3.4–5.3)
SODIUM SERPL-SCNC: 138 MMOL/L (ref 135–145)

## 2024-08-04 PROCEDURE — 250N000013 HC RX MED GY IP 250 OP 250 PS 637

## 2024-08-04 PROCEDURE — 82570 ASSAY OF URINE CREATININE: CPT

## 2024-08-04 PROCEDURE — 97116 GAIT TRAINING THERAPY: CPT | Mod: GP

## 2024-08-04 PROCEDURE — 120N000002 HC R&B MED SURG/OB UMMC

## 2024-08-04 PROCEDURE — 80048 BASIC METABOLIC PNL TOTAL CA: CPT

## 2024-08-04 PROCEDURE — 97530 THERAPEUTIC ACTIVITIES: CPT | Mod: GP

## 2024-08-04 PROCEDURE — 36415 COLL VENOUS BLD VENIPUNCTURE: CPT

## 2024-08-04 RX ORDER — NALOXONE HYDROCHLORIDE 0.4 MG/ML
0.2 INJECTION, SOLUTION INTRAMUSCULAR; INTRAVENOUS; SUBCUTANEOUS
Status: DISCONTINUED | OUTPATIENT
Start: 2024-08-04 | End: 2024-08-09 | Stop reason: HOSPADM

## 2024-08-04 RX ORDER — ACETAMINOPHEN 325 MG/1
650 TABLET ORAL EVERY 6 HOURS PRN
Status: DISCONTINUED | OUTPATIENT
Start: 2024-08-04 | End: 2024-08-04

## 2024-08-04 RX ORDER — NALOXONE HYDROCHLORIDE 0.4 MG/ML
0.4 INJECTION, SOLUTION INTRAMUSCULAR; INTRAVENOUS; SUBCUTANEOUS
Status: DISCONTINUED | OUTPATIENT
Start: 2024-08-04 | End: 2024-08-09 | Stop reason: HOSPADM

## 2024-08-04 RX ORDER — ACETAMINOPHEN 325 MG/1
650 TABLET ORAL 4 TIMES DAILY
Status: DISCONTINUED | OUTPATIENT
Start: 2024-08-04 | End: 2024-08-09 | Stop reason: HOSPADM

## 2024-08-04 RX ADMIN — APIXABAN 2.5 MG: 2.5 TABLET, FILM COATED ORAL at 20:13

## 2024-08-04 RX ADMIN — APIXABAN 2.5 MG: 2.5 TABLET, FILM COATED ORAL at 08:46

## 2024-08-04 RX ADMIN — ATENOLOL 25 MG: 25 TABLET ORAL at 08:46

## 2024-08-04 RX ADMIN — PANTOPRAZOLE SODIUM 40 MG: 40 TABLET, DELAYED RELEASE ORAL at 08:46

## 2024-08-04 RX ADMIN — Medication 5 MG: at 20:13

## 2024-08-04 RX ADMIN — OXYCODONE HYDROCHLORIDE 5 MG: 5 TABLET ORAL at 08:22

## 2024-08-04 RX ADMIN — OXYCODONE HYDROCHLORIDE 5 MG: 5 TABLET ORAL at 14:19

## 2024-08-04 RX ADMIN — OXYCODONE HYDROCHLORIDE 5 MG: 5 TABLET ORAL at 20:13

## 2024-08-04 RX ADMIN — POLYETHYLENE GLYCOL 3350 17 G: 17 POWDER, FOR SOLUTION ORAL at 14:23

## 2024-08-04 ASSESSMENT — ACTIVITIES OF DAILY LIVING (ADL)
ADLS_ACUITY_SCORE: 42
ADLS_ACUITY_SCORE: 38
ADLS_ACUITY_SCORE: 43
ADLS_ACUITY_SCORE: 43
ADLS_ACUITY_SCORE: 42
ADLS_ACUITY_SCORE: 38
ADLS_ACUITY_SCORE: 43
ADLS_ACUITY_SCORE: 42
ADLS_ACUITY_SCORE: 43
ADLS_ACUITY_SCORE: 43
ADLS_ACUITY_SCORE: 42
ADLS_ACUITY_SCORE: 42
ADLS_ACUITY_SCORE: 38
ADLS_ACUITY_SCORE: 43
ADLS_ACUITY_SCORE: 38
ADLS_ACUITY_SCORE: 38
ADLS_ACUITY_SCORE: 42
ADLS_ACUITY_SCORE: 42
ADLS_ACUITY_SCORE: 38

## 2024-08-04 NOTE — PROGRESS NOTES
Brief Progress Note     In to check on patient. States she is feeling well. Was able to ambulate and go to the bathroom without assistance. Is ready for a shower.    Discussed plan to remove TINY drain per Urology recommendation, and patient is amenable.     TINY drain removed uneventfully and site covered with sterile bandage.    Applauded patients efforts with ambulating and encouraged her to take shower this afternoon.    Nakita Mcfarlane MD  Obstetrics & Gynecology, PGY-3  8/4/2024 1:42 PM

## 2024-08-04 NOTE — CONSULTS
Urologic Surgery Consultation Note  [facility]    Alis Hartman MRN# [id number]   Age: 69 year old YOB: 1955     Date of Admission:  7/29/2024    Reason for consult:   Hx of iatragenic cystotomy, repaired primarily       Requesting physician: Yeimy Muniz MD        Level of consult: Consult, follow and place orders           Assessment:   Alis Hartman is a 69 year old female with uterine and cervical cancer, s/p resection on 7/12, with cystotomy repair by Dr. Monroy. Also hx of radiation. Original plan was to keep SPT and kent, and TINY on prior hospital discharge.    Now TINY output low, checking a TINY Cr    SPT and kent both to drainage rn, tolerating well    Cr downtrending, overall doing well             Recommendations:   Ok to remove TINY drain  SPT to remain to drainage  Kent to drainage for now, at 30 day post op, can remove in house if still here.   We will re-arrange follow up to about one month post op          History of Present Illness:   CC: Q about tubes    Aranza is 69 year old female with the above medical and surgical history, here after her recent gyn onc surgery, and is evaluated for some neurological symptoms. Urology consulted for tube plans for recent cystotomy repair    No fever chills  No nausea vomiting  Tolerating Kent and SPT  Reports TINY output relative low          Past Medical History:     Past Medical History:   Diagnosis Date    Atrial fibrillation (H)     Depression     Hypertension     Malignant neoplasm of endocervix (H)     Tx with radiation    Other chronic pain     Low back    Schizophrenia (H)     Urinary incontinence              Past Surgical History:     Past Surgical History:   Procedure Laterality Date    ABDOMINOPLASTY      BIOPSY CERVICAL, LOCAL EXCISION, SINGLE/MULTIPLE  10/26/2011    Procedure:BIOPSY CERVICAL, LOCAL EXCISION, SINGLE/MULTIPLE; EUA, cervical biopsies; Surgeon:BETTY TINEO; Location:[facility] OR    BIOPSY  VAGINAL N/A 6/8/2021    Procedure: BIOPSY, VAGINA;  Surgeon: Dany Perez MD;  Location: [facility] OR    CYSTOSCOPY N/A 5/17/2024    Procedure: Cystoscopy;  Surgeon: Dayn Perez MD;  Location: [facility] OR    CYSTOSTOMY, INSERT TUBE SUPRAPUBIC, COMBINED  7/12/2024    Procedure: Insertion of supra-pubic catheter;  Surgeon: Dany Perez MD;  Location: [facility]    DILATION AND CURETTAGE, WITH ULTRASOUND GUIDANCE N/A 5/17/2024    Procedure: Dilation and curettage of the uterus with drainage of uterine fluid under ultrasound guidance, lysis of vaginal adhesions;  Surgeon: Dany Perez MD;  [facility]: [facility]    EXAM UNDER ANESTHESIA PELVIC N/A 3/12/2020    Procedure: Exam under anesthsia, vaginal biopsies, possible CO2 laser of the vagina;  Surgeon: Lilliam Roy MD;  [facility]: [facility]    GI SURGERY  2004    gastric bypass    HYSTERECTOMY TOTAL ABDOMINAL, BILATERAL SALPINGO-OOPHORECTOMY, COMBINED Bilateral 7/12/2024    Procedure: Diagnostic laparoscopy converted to exploratory laparotomy, total abdominal hysterectomy, bilateral salpingo-oophorectomy, lysis of adhesions >60 min;  Surgeon: Dany Perez MD;  Location: [facility]    LASER CO2 VAGINA N/A 3/2/2015    Procedure: LASER CO2 VAGINA;  Surgeon: Mariela Abdalla MD;  Location: [facility] OR    LASER CO2 VAGINA N/A 5/24/2019    Procedure: Exam under anesthesia, vaginal biopsies, CO2 laser of the vagina;  Surgeon: Lilliam Roy MD;  Location: [facility] OR    LASER CO2 VAGINA N/A 6/8/2021    Procedure: Exam under anesthesia, laser ablation of the upper vagina;  Surgeon: Dany Perez MD;  Location: [facility]    REPAIR BLADDER N/A 7/12/2024    Procedure: Repair bladder;  Surgeon: Dany Perez MD;  Location: [facility]             Social History:     Social History     Socioeconomic History    Marital status: Single     Spouse name: Not on file    Number of children: Not on file    Years of education: Not on file    Highest education level: Not on file   Occupational  History    Not on file   Tobacco Use    Smoking status: Never    Smokeless tobacco: Never   Substance and Sexual Activity    Alcohol use: Not Currently    Drug use: No    Sexual activity: Not on file   Other Topics Concern    Parent/sibling w/ CABG, MI or angioplasty before 65F 55M? Not Asked   Social History Narrative    Not on file     Social Determinants of Health     Financial Resource Strain: Not on file   Food Insecurity: No Food Insecurity (4/13/2023)    [facility]    Hunger Vital Sign     Worried About Running Out of Food in the Last Year: Never true     Ran Out of Food in the Last Year: Never true   Transportation Needs: Not on file   Physical Activity: Not on file   Stress: Not on file   Social Connections: Not on file   Interpersonal Safety: Not on file   Housing Stability: Not on file             Family History:     Family History   Problem Relation Age of Onset    Heart Disease Father     Heart Failure Father     No Known Problems Brother     No Known Problems Brother     No Known Problems Brother     Pancreatitis Brother     Hypertension Sister     Peripheral Vascular Disease Sister     No Known Problems Sister     No Known Problems Son     No Known Problems Daughter              Allergies:      Allergies   Allergen Reactions    Ibuprofen Nausea and Vomiting    Shrimp     Sulfa Antibiotics Rash     Patient started on bactrim 7/24/24 tolerating medication without rash on 7/25              Medications:     Current Facility-Administered Medications   Medication Dose Route Frequency Provider Last Rate Last Admin    acetaminophen (TYLENOL) tablet 650 mg  650 mg Oral 4x Daily Nakita Mcfarlane MD        apixaban ANTICOAGULANT (ELIQUIS) tablet 2.5 mg  2.5 mg Oral BID Anali Rodríguez MD   2.5 mg at 08/04/24 0846    atenolol (TENORMIN) tablet 25 mg  25 mg Oral Daily Krupa Escobar MD   25 mg at 08/04/24 0846    [Held by provider] furosemide (LASIX) tablet 20 mg  20 mg  Oral Daily Kelsey Mccracken APRN CNP   20 mg at 08/02/24 0857    [Held by provider] gabapentin (NEURONTIN) capsule 200 mg  200 mg Oral TID Krupa Escobar MD        Lidocaine (LIDOCARE) 4 % Patch 1-2 patch  1-2 patch Transdermal Q24H PRN Anali Rodríguez MD        lidocaine (LMX4) cream   Topical Q1H PRN Anali Rodríguez MD        lidocaine 1 % 0.1-1 mL  0.1-1 mL Other Q1H PRN Anali Rodríguez MD        melatonin tablet 5 mg  5 mg Oral At Bedtime Nakita Mcfarlane MD        naloxone (NARCAN) injection 0.2 mg  0.2 mg Intravenous Q2 Min PRN Yeimy Muniz MD        Or    naloxone (NARCAN) injection 0.4 mg  0.4 mg Intravenous Q2 Min PRN Yeimy Muniz MD        Or    naloxone (NARCAN) injection 0.2 mg  0.2 mg Intramuscular Q2 Min PRN Yeimy Muniz MD        Or    naloxone (NARCAN) injection 0.4 mg  0.4 mg Intramuscular Q2 Min PRN Yeimy Muniz MD        oxyCODONE (ROXICODONE) tablet 5 mg  5 mg Oral Q4H PRN Krupa Escobar MD   5 mg at 08/04/24 0822    pantoprazole (PROTONIX) EC tablet 40 mg  40 mg Oral QAM AC Anali Rodríguez MD   40 mg at 08/04/24 0846    Patient is already receiving anticoagulation with heparin, enoxaparin (LOVENOX), warfarin (COUMADIN)  or other anticoagulant medication   Does not apply Continuous PRN Anali Rodríguez MD        [Held by provider] phenazopyridine (PYRIDIUM) tablet 100 mg  100 mg Oral TID PRN Anali Rodríguez MD        polyethylene glycol (MIRALAX) Packet 17 g  17 g Oral Daily Anali Rodríguez MD   17 g at 08/03/24 0846    sodium chloride (PF) 0.9% PF flush 3 mL  3 mL Intracatheter Q8H Anali Rodríguez MD   3 mL at 08/04/24 0023    sodium chloride (PF) 0.9% PF flush 3 mL  3 mL Intracatheter q1 min prn Anali Rodríguez MD   3 mL at 08/02/24 1647    [Held by provider] tiZANidine (ZANAFLEX) tablet 4 mg  4 mg Oral TID PRN Anali Rodríguez MD        traZODone (DESYREL) half-tab 25 mg  25 mg Oral At Bedtime PRN Nakita Mcfarlane MD        zolpidem (AMBIEN) tablet 10 mg  10 mg Oral At Bedtime PRN  Robert Coulter MD                 Review of Systems:      All other review of systems negative, except for what is mentioned above        Physical Exam:   /73 (BP Location: Left arm)   Pulse 55   Temp 98.4  F (36.9  C) (Oral)   Resp 14   Wt 76.2 kg (167 lb 15.9 oz)   SpO2 100%   BMI 32.13 kg/m    GEN: AAO*3, not in acute distress, lying comfortably  HEENT: atraumatic, mucosa moist  CVS: normal heart rate, perfusing extremeties  RESP: no resp distress, satting well on RA  ABD: Appears nondistended  : Shahid and SPT in place with clear yellow urine return  EXT: Extremities atraumatic, grossly normal range of motion  NEURO/PSYCH: CN grossly normal, no focal weakness, normal mood and thought process              Data:     Recent Labs   Lab Test 08/04/24  0557 08/03/24  0513 08/02/24  0605 08/01/24  0554 07/31/24  0655   WBC  --   --  7.1 5.8 5.6   HGB  --   --  8.6* 8.6* 6.6*   CR 0.99* 1.09* 1.37* 1.19* 1.20*         CT Abdomen Pelvis w Contrast 07/21/2024    Narrative  EXAMINATION: CT ABDOMEN PELVIS W CONTRAST, 7/21/2024 6:27 PM    TECHNIQUE:  Helical CT images from the thoracic inlet through the  symphysis pubis were obtained with contrast. Contrast dose: 109ml  isovue 370    COMPARISON: CT chest abdomen and pelvis 5/3/2024, multiple priors.    HISTORY: Recent hysterectomy oophorectomy and surgery, new nausea and  vomiting leukocytosis evaluate for pelvic infection, bowel obstruction    FINDINGS:    Lower chest:  Partially visualized heart unremarkable. Visualized lung bases  unremarkable. Calcific density of the left lung base.    Abdomen and pelvis:  Liver: Stable 8 mm hypoattenuating hepatic cyst in the left lobe.  Remainder of liver unremarkable.  Biliary System: No intrahepatic or extrahepatic biliary ductal  dilation. Minimally distended gallbladder, no wall thickening. No  radiopaque stones.  Pancreas: Mildly prominent pancreatic duct without definitive  dilation. Otherwise  unremarkable.  Adrenal glands: Unremarkable  Spleen: Multiple calcified splenic granulomas, otherwise unremarkable.  Kidneys: Improved, now mild bilateral hydronephrosis. Symmetric  slightly heterogeneous nephrogram, no stones. Mild reactive urothelial  enhancement intermittently along the ureters otherwise unremarkable.  Pelvis: Surgical changes of abdominal hysterectomy and bilateral  salpingo-oophorectomy. Shahid catheter and suprapubic catheter seen  within the bladder with balloons inflated. Multiple foci of air are  seen within and surrounding the bladder, there are diffuse  inflammatory changes surrounding the bladder. The bladder is not  well-defined, collapsed. The wall posteriorly to the right of midline,  series 4 image 307 is not well visualized with a small amount of fluid  and air in this region.    Gastrointestinal tract: Surgical changes of Kiko-en-Y gastric bypass,  unchanged small hiatal hernia with some fluid in the esophagus.  Multiple fluid-filled loops of small and large bowel, fluid and stool  is seen down to level of the rectum. Pseudothickening appearance of  several loops of small bowel due to decompression, decompressed ileum  down to the level of the IC junction. In the context of possible early  obstruction, transition point may be seen in the anterior abdomen  (sagittal series 7 image 80) and coronal series 4 images 223 through  258. Small bowel loops downstream from this region are collapsed to  the ileocecal valve however there is still stool and fluid throughout  the colon as discussed above. Reactive changes in pelvic bowel loops  noted.    Mesentery/peritoneum/retroperitoneum: Inflammatory changes without  definitive organized fluid collection in the pelvis. Scattered foci of  intra-abdominal air, noted along the liver (series 3 image 16), for  example. Several foci of air within the pelvis, multiple which appear  external to the bladder in the right hemipelvis (series 3 image  64).  Peroneal thickening along the anterior low abdomen. Perirectal and  presacral edema.    Lymph nodes: Multiple enlarged reactive retroperitoneal lymph nodes,  and no mesenteric adenopathy.  Vasculature: Patent abdominal aorta, nonaneurysmal, patent branch  vasculature. The portal vein, splenic vein, and superior mesenteric  vein are patent without obstruction.    Bones: No acute fractures, no high-grade osseous lesions. Degenerative  changes of the lumbar spine unchanged grade 1 anterolisthesis L3 on  L4, and grade 2 anterolisthesis of L4 on L5. Multilevel disc height  loss with vacuum disc phenomenon. Posterior spondylosis, moderate to  severe.    Soft Tissues: Soft tissue anasarca along the anterior low abdomen,  soft tissue gas noted at the site of the suprapubic catheter entrance.  No organized soft tissue fluid collection. Anasarca along the  bilateral hips.    Impression  IMPRESSION:  1. Surgical changes of hysterectomy and bilateral  salpingo-oophorectomy. Ill-defined inflammatory changes throughout the  low pelvis without definitive organized fluid collection such as  abscess identified.  2. Multiple foci of air seen within the abdomen, this may be due to  the recent surgery or the percutaneous abdominal drain.  3. Multiple fluid-filled loops of bowel. Fluid-filled colon is seen  down to level of the rectum suggestive of slow motility. However the  distal ileum is decompressed with possible transition point in the  anterior lower abdomen as detailed above. May represent mixed etiology  of ileus with partial obstruction. Radiographic follow-up suggested.  4. Improved now mild bilateral hydronephrosis. Slightly heterogeneous  enhancement of the kidneys. Mild urothelial enhancement of the  ureters, this is likely reactive.  5. Multiple enlarged retroperitoneal lymph nodes which are likely  reactive.  6. Soft tissue anasarca. There is also edema involving the mesentery  and pelvic soft tissues.  7. Soft  tissue gas and inflammatory changes within the abdominal wall  at the site of the suprapubic catheter, no organized fluid collection  at this location.  8. Bladder is collapsed with Kent catheter and suprapubic catheter in  place. 9. Bladder wall is difficult to discern which may be due to  adjacent inflammatory changes and timing of contrast bolus which is  slightly early. Possible defect along the right posterior bladder wall  with adjacent fluid and air. Of note, a bladder repair was made during  surgery. Could consider follow-up CT cystogram to evaluate for any  active leak in this region.      I have personally reviewed the examination and initial interpretation  and I agree with the findings.    FORREST RAMIREZ MD      SYSTEM ID:  [id number]           Discussed with staff surgeon Dr. Monroy, who agrees with the assessment and plans    Kristian Rivera MD   PGY-2 Urology  [facility]    I agree with remove TINY  Plan for kent removal around 4 weeks postop. Until then, keep two catheters draining given extensive bladder repair.  Plan for ongoing suprapubic tube to drainage (chronic retention, hydronephrosis, incontinence) though we could consider other bladder management strategies in the future.    Black Monroy MD

## 2024-08-04 NOTE — PLAN OF CARE
Goal Outcome Evaluation:      Plan of Care Reviewed With: patient    Overall Patient Progress: no changeOverall Patient Progress: no change    Outcome Evaluation: [id number]    AVSS. Continues to have lower abdominal pain. PRN oxycodone given x1 with relief. Denies nausea/vomiting and SOB. Not out of bed this shift. Asked Pt several times about getting up for a walk. Pt said she would do a couple of walks tomorrow. Fair PO intake. LBM 8/2. Shahid and suprapubic catheter remain in place with adequate output. Buttock wound care done this evening. Wound seems to be improving. Pt has been repositioning when asked. TINY drain x1 has minimal output. Pt declined compression stocking tonight. No acute events this shift. Continue with POC.

## 2024-08-04 NOTE — PROGRESS NOTES
GYN ONC PROGRESS NOTE  08/04/24    HD#6 new neurologic symptoms (confusion, dysarthria/dyskinesia)  Disease: Serous uterine carcinoma s/p XL, SNEHA, BSO, EDIE and cystotomy repair with suprapubic bladder insertion and omental flap on 7/12/24. TINY drain in place. Hx REMA-III s/p laser tx. Remote hx cervical cancer s/p radiation.    24 hour events:   - Cr bump to 1.37 - held lasix and 500mL LR bolus > Cr 1.05 yesterday, > Cr 0.99 today  - Zoloft/seroquel d/c'd, no recurrence of paranoia since.     SUBJECTIVE:   Feeling sleepy this morning. States she had a hard time sleeping last night and has no new concerns this morning. Was able to drink more fluids last night than usual. She reports no pain at sites of drains/catheters. Patient believes she's at baseline mentation.    OBJECTIVE:   Patient Vitals for the past 24 hrs:   BP Temp Temp src Pulse Resp SpO2 Weight   08/04/24 0741 113/73 98.4  F (36.9  C) Oral 55 14 100 % 76.2 kg (167 lb 15.9 oz)   08/04/24 0415 114/64 98  F (36.7  C) Oral 58 16 100 % --   08/04/24 0034 100/62 98.3  F (36.8  C) Oral 60 16 100 % --   08/03/24 1600 108/55 98  F (36.7  C) Oral 63 16 100 % --   08/03/24 1207 105/75 98  F (36.7  C) Oral -- 16 100 % --   08/03/24 0849 -- 97.2  F (36.2  C) Axillary 57 16 -- 76.1 kg (167 lb 12.3 oz)   08/03/24 0846 117/80 -- -- -- -- -- --     Constitutional: chronically ill-appearing female in no acute distress, resting in bed  Cardiovascular: Appears well perfused  Respiratory: Normal work of breathing on room air  Gastrointestinal: Abdomen non-distended. Suprapubic catheter and TINY drain sites are C/D/I. Small amount of serous drainage present in TINY drain bulb.  : Clear, dark yellow urine present in both urinary kent and suprapubic catheter drainage bags.  Neuro: Tired but able to answer questions with clear speech. No dyskinetic movement on exam.   Extremities: BLE without significant appreciable edema.     New Labs/Imaging-   Results for orders placed or  "performed during the hospital encounter of 07/29/24 (from the past 24 hour(s))   Basic metabolic panel   Result Value Ref Range    Sodium 138 135 - 145 mmol/L    Potassium 4.5 3.4 - 5.3 mmol/L    Chloride 113 (H) 98 - 107 mmol/L    Carbon Dioxide (CO2) 20 (L) 22 - 29 mmol/L    Anion Gap 5 (L) 7 - 15 mmol/L    Urea Nitrogen 11.5 8.0 - 23.0 mg/dL    Creatinine 0.99 (H) 0.51 - 0.95 mg/dL    GFR Estimate 61 >60 mL/min/1.73m2    Calcium 8.1 (L) 8.8 - 10.4 mg/dL    Glucose 99 70 - 99 mg/dL   Extra Tube    Narrative    The following orders were created for panel order Extra Tube.  Procedure                               Abnormality         Status                     ---------                               -----------         ------                     Extra Purple Top Tube[[id number]]                            Final result                 Please view results for these tests on the individual orders.   Extra Purple Top Tube   Result Value Ref Range    Hold Specimen [facility]        Pending cultures (date obtained):   - Blood culture (7/29): NG x3D (final)  - Urine culture (7/29): negative (final)    Urine output:  Adequate at 0.96 ml/kg/hr    ASSESSMENT:   69 year old female with serous uterine carcinoma s/p recent XL, SNEHA, BSO, EDIE with Dr. Perez c/b cystotomy on 7/12/2024 with suprapubic and urethral catheters in place. Also underwent laser treatment for REMA-III in 2021 and remote pelvic radiation for cervical cancer. Readmitted to Gynecologic Oncology service 7/21/2024 for management of SBO and UTI, discharged to TCU 7/26/2024. Now re-presented to ED for evaluation of new/progressive neurological symptoms including confusion and dysarthria over the two days prior to admission. Also new dyskinetic movements appreciated on Gynecologic Oncology team evaluation that are new since recent admissions. Per Neurology consultation, \"Overall impression at this time is that this patient is experiencing medication toxicity in the setting " "of multiple CNS medications,\" exacerbated by worsening renal function. Symptoms gradually improving with discontinuation of suspected anti-dopaminergic and anti-cholinergic agents; restarting indicated medications under guidance of Neurology and Psychiatry consultants. After further discussion with psychiatry on 8/2, patient and daughter ultimately elected to hold. Remains inpatient while awaiting TCU placement, no concerns for schizophrenia exacerbation or increased paranoia on exam this AM.     # Confusion  # Dysarthria  # Dyskinesia  - Appreciate Neurology, Psychiatry recommendations:  - Initially held all medications that could be contributing to dystonic reaction, including: Seroquel, Tizanidine, Robaxin, Ambien, Cogentin, oxybutynin, oxycodone, gabapentin   - Patient declined restart of seroquel, gabapentin, zoloft.  - Scheduled melatonin at bedtime per Neurology, delirium precautions     # Schizophrenia  # Depression  - PTA Zoloft 150 mg, seroquel, gabapentin discontinued   - Will continue to monitor patient for changes in mentation, evidence of response to internal stimuli, or paranoia. None apparent this morning   - PTA vistaril, consider holding Ambien for risk of delirium.   - Unclear outpatient compliance, recent TCU administration of above agents; worked to clarify (see separate progress notes from 7/30 detailing conversations with TCU staff, patient's family members)     # Intermittent hypotension, improved  # Supplemental O2 requirement, now weaned  - S/p NS bolus overnight HD#1-2 (total of 1L since presentation to ED)  - Although no clear localizing chest symptoms, consider chest imaging to evaluate for pneumonia if vital sign changes recur. VSS overnight    # Recent UTI, negative urine culture (7/29)  # Recent superficial SSI  - s/p Zosyn x1 in ED 7/29  - completed Bactrim/Flagyl course on 7/31  - f/up blood cultures collected in ED (NGTD - final result)     # Recent intraoperative cystotomy  # " Suprapubic catheter, urethral kent catheter in place  - Urology contacted last night, was advised to place consult in the morning for recommendations for catheter cares, which was ordered. Will follow-up with recs.  - Outpatient visit with Urology scheduled 8/7/2024; contact if still inpatient at that time  - Recent ~14d course of oxybutynin, held/will not resume given neurologic symptoms  - Pyridium prn inially available, held as she had not required this     # Cr elevation  # Possible underlying CKD  - Cr bump to 1.37 8/2, lasix discontinued. Downtrended to 1.09 yesterday. Today's Cr 0.99  - Encouraged PO intake  - Continue to trend serum Cr, UOP  - Suspect Cr elevation constitutes prerenal MADHU d/t dehydration with disorientation  - If persistent c/f MADHU, further laboratory/imaging workup as indicated  - Consider Nephrology involvement pending clinical course     # Anemia  - Goal Hgb >7, continue to trend  - received 1u pRBCs 7/31 with appropriate Hgb rise    # Perianal skin breakdown  - WOC following     # Chronic pain  # Osteoarthritis  - Scheduled tylenol  - PTA initially held gabapentin held given neurologic symptoms, despite Neurology/Psychiatry recommendation to restart, patient/family requested it held.  - PTA prn oxycodone also initially held but now restarted; lidocaine patches prn also available     # Hypertension  - PTA lasix held as above given Cr elevation   - Patient has been normotensive overnight     # Atrial fibrillation  - Patient currently in sinus bradycardia  - Continue PTA atenolol   - Dose reduced from 100 to 25 mg daily given persistent hypotension and bradycardia, now improved     # History of gastric bypass  - Avoid NSAIDs     # Constipation  - Scheduled Miralax     # Serous uterine carcinoma, s/p SNEHA/BSO  # History of REMA-III, s/p laser therapy  # History of cevical cancer, s/p radiation therapy  - Plan to follow-up outpatient in clinic with primary oncologist Dr. Perez for long term  treatment planning     # Inpatient status  - PPX: PTA abixaban (on 28d post-op course to be completed 8/9), SCDs, IS; PPI now added for prolonged admission  - PT/OT consults ordered for deconditioning, dispo recommendations. To see PT today  - SW consult placed for dispo planning; TCU placement pending/insurance status  - Pharmacy consult placed for reconciliation after presentation with polypharmacy     Patient discussed with Dr. Aleida Mathis MD  Gynecologic-Oncology service  Obstetrics and Gynecology Resident, PGY-2  Gyn-Onc pager: [phone]   08/04/2024 8:02 AM

## 2024-08-04 NOTE — PLAN OF CARE
Goal Outcome Evaluation:      Plan of Care Reviewed With: patient    Overall Patient Progress: improvingOverall Patient Progress: improving       VSS on RA. Abd pain controlled with oxycodone twice this shift. TINY removed by provider. SP cath drsg changed, good output. Shahid also with good output. Sacral wound cares done. Some new redness between thighs by perineum, barrier cream applied. Up with assist of 1 and walker. Encourage walking 4 times a day

## 2024-08-04 NOTE — PLAN OF CARE
Goal Outcome Evaluation:      Plan of Care Reviewed With: patient    Overall Patient Progress: improvingOverall Patient Progress: improving    VSS on RA. Denies pain, n/v, SOB. PIV saline locked. Shahid and suprapubic cath in place, AUOP. TINY to bulb suction with very minimal output. Assist of 1. Regular diet. Awaiting TCU placement. No acute events overnight. Continue with POC.

## 2024-08-04 NOTE — PROGRESS NOTES
Brief Interval Progress Note    MD to bedside for PM rounds. Patient with a bit of abdominal incisional discomfort, but otherwise feeling good. Mildly elevated Creatinine today, encouraged PO hydration and lasix held. Will recheck in the AM. Patient continues to remain at reported baseline without evidence of paranoia or responding to internal stimuli. Dispo pending TCU placement    Ashwin Mathis MD  Gynecologic-Oncology service  Obstetrics and Gynecology Resident, PGY-2  08/03/2024 9:20 PM

## 2024-08-05 LAB
ANION GAP SERPL CALCULATED.3IONS-SCNC: 5 MMOL/L (ref 7–15)
BUN SERPL-MCNC: 11.5 MG/DL (ref 8–23)
CALCIUM SERPL-MCNC: 8.1 MG/DL (ref 8.8–10.4)
CHLORIDE SERPL-SCNC: 111 MMOL/L (ref 98–107)
CREAT SERPL-MCNC: 0.91 MG/DL (ref 0.51–0.95)
EGFRCR SERPLBLD CKD-EPI 2021: 67 ML/MIN/1.73M2
GLUCOSE SERPL-MCNC: 98 MG/DL (ref 70–99)
HCO3 SERPL-SCNC: 22 MMOL/L (ref 22–29)
POTASSIUM SERPL-SCNC: 4.2 MMOL/L (ref 3.4–5.3)
SODIUM SERPL-SCNC: 138 MMOL/L (ref 135–145)

## 2024-08-05 PROCEDURE — 250N000013 HC RX MED GY IP 250 OP 250 PS 637

## 2024-08-05 PROCEDURE — 99232 SBSQ HOSP IP/OBS MODERATE 35: CPT | Mod: 24 | Performed by: OBSTETRICS & GYNECOLOGY

## 2024-08-05 PROCEDURE — 80048 BASIC METABOLIC PNL TOTAL CA: CPT

## 2024-08-05 PROCEDURE — 999N000128 HC STATISTIC PERIPHERAL IV START W/O US GUIDANCE

## 2024-08-05 PROCEDURE — 36415 COLL VENOUS BLD VENIPUNCTURE: CPT

## 2024-08-05 PROCEDURE — 120N000002 HC R&B MED SURG/OB UMMC

## 2024-08-05 RX ORDER — LIDOCAINE 40 MG/G
CREAM TOPICAL
Status: DISCONTINUED | OUTPATIENT
Start: 2024-08-05 | End: 2024-08-08

## 2024-08-05 RX ADMIN — PANTOPRAZOLE SODIUM 40 MG: 40 TABLET, DELAYED RELEASE ORAL at 07:57

## 2024-08-05 RX ADMIN — POLYETHYLENE GLYCOL 3350 17 G: 17 POWDER, FOR SOLUTION ORAL at 07:57

## 2024-08-05 RX ADMIN — APIXABAN 2.5 MG: 2.5 TABLET, FILM COATED ORAL at 07:57

## 2024-08-05 RX ADMIN — ATENOLOL 25 MG: 25 TABLET ORAL at 07:57

## 2024-08-05 RX ADMIN — OXYCODONE HYDROCHLORIDE 5 MG: 5 TABLET ORAL at 20:38

## 2024-08-05 RX ADMIN — OXYCODONE HYDROCHLORIDE 5 MG: 5 TABLET ORAL at 16:13

## 2024-08-05 RX ADMIN — OXYCODONE HYDROCHLORIDE 5 MG: 5 TABLET ORAL at 11:28

## 2024-08-05 RX ADMIN — Medication 5 MG: at 21:41

## 2024-08-05 RX ADMIN — ZOLPIDEM TARTRATE 10 MG: 5 TABLET ORAL at 00:07

## 2024-08-05 RX ADMIN — APIXABAN 2.5 MG: 2.5 TABLET, FILM COATED ORAL at 20:38

## 2024-08-05 ASSESSMENT — ACTIVITIES OF DAILY LIVING (ADL)
ADLS_ACUITY_SCORE: 42
ADLS_ACUITY_SCORE: 38
ADLS_ACUITY_SCORE: 42
ADLS_ACUITY_SCORE: 38
ADLS_ACUITY_SCORE: 42
ADLS_ACUITY_SCORE: 38
ADLS_ACUITY_SCORE: 38
ADLS_ACUITY_SCORE: 42
ADLS_ACUITY_SCORE: 42
ADLS_ACUITY_SCORE: 38
ADLS_ACUITY_SCORE: 38
ADLS_ACUITY_SCORE: 42
ADLS_ACUITY_SCORE: 42
ADLS_ACUITY_SCORE: 38
ADLS_ACUITY_SCORE: 42
ADLS_ACUITY_SCORE: 38
ADLS_ACUITY_SCORE: 42
ADLS_ACUITY_SCORE: 42

## 2024-08-05 NOTE — PROGRESS NOTES
Care Management Follow Up    Length of Stay (days): 6    Expected Discharge Date: 08/12/2024     Concerns to be Addressed: discharge planning (advanced care directive)     Patient plan of care discussed at interdisciplinary rounds: Yes    Anticipated Discharge Disposition: Skilled Nursing Facility, Transitional Care     Anticipated Discharge Services:    Anticipated Discharge DME: None    Patient/family educated on Medicare website which has current facility and service quality ratings: yes  Education Provided on the Discharge Plan: Yes  Patient/Family in Agreement with the Plan: yes    Referrals Placed by CM/SW: Post Acute Facilities  Private pay costs discussed: Not applicable    Additional Information:  BANDAR asked KERMIT Reid, to follow up with pt's daughter on pt's renewal application with the [facility]. Per note on 8/3  pt's daughter is going to the [facility] to resubmit renewal application.    Social work will continue to follow and provide assistance to ensure a safe and timely discharge   BRIANNA Sanderson   5A Oncology beds:5220[address]40 & 5C  beds 5417-32 (non BMT )      Vocera:  Phone: [phone]  Pager: [phone]

## 2024-08-05 NOTE — PROGRESS NOTES
GYN ONC PROGRESS NOTE  08/06/2024    HD#9 new neurologic symptoms (confusion, dysarthria/dyskinesia)  Disease: Serous uterine carcinoma s/p XL, SNEHA, BSO, EDIE and cystotomy repair with suprapubic bladder insertion and omental flap on 7/12/24. TINY drain in place. Hx REMA-III s/p laser tx. Remote hx cervical cancer s/p radiation.    24 hour events:   - SW coordinating with patient's family for insurance reactivation (cannot place TCU referrals until this is done)    SUBJECTIVE:   Patient is awake, pleasant and interactive this morning. States she slept fine. Has mild pain in her lower belly this morning but no new symptoms, questions, or other concerns.    OBJECTIVE:   Patient Vitals for the past 24 hrs:   BP Temp Temp src Pulse Resp SpO2 Weight   08/06/24 0124 117/69 98  F (36.7  C) Oral 58 18 100 % --   08/05/24 2028 120/57 98.2  F (36.8  C) Oral 71 18 -- --   08/05/24 1142 105/59 98.3  F (36.8  C) Oral 57 20 100 % --   08/05/24 1046 -- -- -- -- -- -- 70.9 kg (156 lb 3.2 oz)   08/05/24 0810 126/62 98.4  F (36.9  C) Oral 86 20 100 % --     Constitutional: chronically ill-appearing female in no acute distress, resting in bed  Cardiovascular: Appears well perfused  Respiratory: Normal work of breathing on room air  Gastrointestinal: Abdomen non-distended. Suprapubic catheter site and former TINY drain site are C/D/I.  : Clear yellow urine present in both urinary kent and suprapubic catheter drainage bags.  Neuro: Clear speech. No dyskinetic movement.   Extremities: BLE without significant appreciable edema.     New Labs/Imaging-    Latest Reference Range & Units 08/01/24 05:54 08/02/24 06:05 08/03/24 05:13 08/04/24 05:57 08/05/24 05:29   Creatinine 0.51 - 0.95 mg/dL 1.19 (H) 1.37 (H) 1.09 (H) 0.99 (H) 0.91     Pending cultures (date obtained):   - Blood culture (7/29): NG x3D (final)  - Urine culture (7/29): negative (final)        ASSESSMENT:   69 year old female with serous uterine carcinoma s/p recent XL, SNEHA, BSO,  "EDIE with Dr. Perez c/b cystotomy on 7/12/2024 with suprapubic and urethral catheters in place. Also underwent laser treatment for REMA-III in 2021 and remote pelvic radiation for cervical cancer. Readmitted to Gynecologic Oncology service 7/21/2024 for management of SBO and UTI, discharged to TCU 7/26/2024. Now re-presented to ED for evaluation of new/progressive neurological symptoms including confusion and dysarthria over the two days prior to admission. Also new dyskinetic movements appreciated on Gynecologic Oncology team evaluation that are new since recent admissions. Per Neurology consultation, \"Overall impression at this time is that this patient is experiencing medication toxicity in the setting of multiple CNS medications,\" exacerbated by worsening renal function. Symptoms gradually improving with discontinuation of suspected anti-dopaminergic and anti-cholinergic agents; restarting indicated medications under guidance of Neurology and Psychiatry consultants. After further discussion with psychiatry on 8/2, patient and daughter ultimately elected to discontinue essentially all PTA psychiatric medications; no concerns for schizophrenia exacerbation or increased paranoia at this time. Remains inpatient while awaiting TCU placement.    # Confusion, improved  # Dysarthria, resolved  # Dyskinesia, resolved  - Appreciate Neurology, Psychiatry recommendations:  - Initially held all medications that could be contributing to dystonic reaction, including: Seroquel, Tizanidine, Robaxin, Ambien, Cogentin, oxybutynin, oxycodone, gabapentin   - Patient ultimately declined recommended re-initiation of seroquel, gabapentin, zoloft.  - Scheduled melatonin at bedtime per Neurology (recently increased given trouble sleeping), delirium precautions     # Schizophrenia  # Depression  # Insomnia  - PTA Zoloft 150 mg, seroquel, gabapentin now discontinued per family and patient as above. Our recommendation is for reinitiation of " these medications.   - Will continue to monitor patient for changes in mentation, evidence of response to internal stimuli, or paranoia. None apparent at this time.  - PTA vistaril held; Trazodone and PTA Ambien currently available PRN to support sleep but patient/family also declining these   - Patient did accept Ambien 10 mg to promote sleep overnight 8/4-8/5 with good effect     # Intermittent hypotension, improved  # Supplemental O2 requirement, now weaned  - S/p NS bolus overnight HD#1-2 (total of 1L since presentation to ED)  - Although no clear localizing chest symptoms, consider chest imaging to evaluate for pneumonia if vital sign changes recur. VSS overnight.    # Recent UTI, negative urine culture (7/29)  # Recent superficial SSI  - s/p Zosyn x1 in ED 7/29  - completed Bactrim/Flagyl course on 7/31  - blood cultures collected in ED resulted negative     # Recent intraoperative cystotomy  # Suprapubic catheter, urethral kent catheter in place  - Per discussion with Urology: TINY drain removed; remove urethral kent if inpatient 8/11, plan suprapubic catheter management outpatient in Urology clinic  - Outpatient visit with Urology scheduled 8/7/2024; their team is working to reschedule this for next month  - Recent ~14d course of oxybutynin, held/will not resume given neurologic symptoms  - Pyridium prn inially available, held as she had not required this     # Cr elevation  # Possible underlying CKD  - Cr increased to 1.37 8/2, lasix held. Downtrended to 1.09> 0.99> 0.91  - Continue to trend serum UOP, Cr as indicated  - Suspect Cr elevation constitutes prerenal MADHU d/t dehydration with disorientation   - Consider to encourage PO intake  - If persistent c/f MADHU, further laboratory/imaging workup as indicated  - Consider Nephrology involvement pending clinical course     # Anemia  - Goal Hgb >7, continue to trend as indicated  - received 1u pRBCs 7/31 with appropriate Hgb rise    # Perianal skin breakdown  -  WOC following     # Chronic pain  # Osteoarthritis  - Scheduled tylenol  - PTA initially held gabapentin held given neurologic symptoms, despite Neurology/Psychiatry recommendation to restart, patient/family declined  - PTA prn oxycodone also initially held but now restarted; lidocaine patches prn also available     # Hypertension  - PTA lasix held as above given Cr elevation   - Patient has been normotensive     # Atrial fibrillation  - Patient currently in sinus bradycardia  - Continue PTA atenolol   - Dose reduced from 100 to 25 mg daily given persistent hypotension and bradycardia, now improved     # History of gastric bypass  - Avoid NSAIDs     # Constipation  - Scheduled Miralax     # Serous uterine carcinoma, s/p SNEHA/BSO  # History of REMA-III, s/p laser therapy  # History of cevical cancer, s/p radiation therapy  - Plan to follow-up outpatient in clinic with primary oncologist Dr. Perez for long term treatment planning  - Will need a plan for chemotherapy initiation pending discharge planning, may need carboplatin/paclitaxel while inpatient     # Inpatient status  - PPX: PTA abixaban (on 28d post-op course to be completed 8/9), SCDs, IS; PPI now added for prolonged admission  - PT/OT following for deconditioning, dispo recommendations (TCU)  - SW consult placed for dispo planning; TCU placement pending/insurance status  - Pharmacy consult placed for reconciliation after presentation with polypharmacy     Anali Rodríguez MD, PGY-4  6:15 AM  [facility] Obstetrics & Gynecology Residency    Gyn Onc Pager: [phone]    Gynecologic Oncology Attending Attestation  I have seen the patient on rounds with the team. Working on coverage and placement. Also need plan for chemotherapy pending discussions regarding coverage plan. I have discussed the patient and plan of care with the resident as documented in the note above, which I have edited as necessary.    Dany Perez MD    Gynecologic Oncology

## 2024-08-05 NOTE — PLAN OF CARE
Goal Outcome Evaluation:      Plan of Care Reviewed With: patient    Overall Patient Progress: improvingOverall Patient Progress: improving    Outcome Evaluation: [id number]    HD#6. AVSS. Pain was a 7/10 at the beginning of the shift. PRN oxycodone given x1 and Pt said pain went completely away afterwards. Denies nausea/vomiting or SOB. Up SBA-1 with walker, gait belt. Fair PO intake. LBM today 8/4, but Pt reported it was hard and small. Pt took miralax this morning and declined other pharm interventions. Pt took a total of 3 walks today and tolerated it well. Shahid and suprapubic catheter remain in place with adequate output. TINY drain removed during day shift. Wound dressing change to buttocks and pannus skin fold completed per POC. Reeducated Pt about repositioning and preferred elevation limits for HOB. Pt showed more motivation tonight. Continue with POC.

## 2024-08-05 NOTE — PLAN OF CARE
/59 (BP Location: Right arm)   Pulse 57   Temp 98.3  F (36.8  C) (Oral)   Resp 20   Wt 70.9 kg (156 lb 3.2 oz)   SpO2 100%   BMI 29.88 kg/m      Pt is A&Ox4. VSS. On room air. Tolerating regular diet with no reports of nausea. Pain managed with prn oxycodone; pt declines tylenol. Q2 repositioning. Perineal wound care done x1 during shift. Will continue plan of care.

## 2024-08-05 NOTE — PROGRESS NOTES
Care Management Follow Up    Length of Stay (days): 6    Expected Discharge Date: 08/12/2024     Concerns to be Addressed: discharge planning (advanced care directive)     Patient plan of care discussed at interdisciplinary rounds: Yes    Anticipated Discharge Disposition: Skilled Nursing Facility, Transitional Care     Anticipated Discharge Services:  therapy  Anticipated Discharge DME: None    Patient/family educated on Medicare website which has current facility and service quality ratings: yes  Education Provided on the Discharge Plan: Yes  Patient/Family in Agreement with the Plan: yes    Referrals Placed by CM/SW: Post Acute Facilities  Private pay costs discussed: Not applicable    Additional Information:    CHW spoke with Joy and she confirmed that she was able to fax over the [place] renewal forms on behalf of Aranza, her mother. She said she will also try uploading the documents to an online portal just in case and will reach back out when she hears something from the [facility]. CHW and Joy agreed to check in within the next couple of days, as it takes time for the [facility] to receive all forms and documents. CHW to continue to follow up.      Jeanette De Santiago  5A/5C Community Health Worker  [place], [place]  [email or web address]  [phone]

## 2024-08-05 NOTE — PROGRESS NOTES
GYN ONC PROGRESS NOTE  08/04/2024    HD#8 new neurologic symptoms (confusion, dysarthria/dyskinesia)  Disease: Serous uterine carcinoma s/p XL, SNEHA, BSO, EDIE and cystotomy repair with suprapubic bladder insertion and omental flap on 7/12/24. TINY drain in place. Hx REMA-III s/p laser tx. Remote hx cervical cancer s/p radiation.    24 hour events:   - Per discussion with Urology: TINY drain removed; remove urethral kent if inpatient 8/11, plan suprapubic catheter management in Urology clinic  - Difficulty sleeping> melatonin increased, tried Ambien with good effect    SUBJECTIVE:   At the time of pre-rounds, patient is sleeping soundly. Since she has had quite a lot of difficultly sleeping over the past few nights, so she was not disturbed. No concerns overnight per RN.    OBJECTIVE:   Patient Vitals for the past 24 hrs:   BP Temp Temp src Pulse Resp SpO2 Weight   08/04/24 1626 128/69 98.4  F (36.9  C) Oral 52 16 100 % --   08/04/24 0741 113/73 98.4  F (36.9  C) Oral 55 14 100 % 76.2 kg (167 lb 15.9 oz)   08/04/24 0415 114/64 98  F (36.7  C) Oral 58 16 100 % --   08/04/24 0034 100/62 98.3  F (36.8  C) Oral 60 16 100 % --     Per prior assessment:  Constitutional: chronically ill-appearing female in no acute distress, resting in bed  Cardiovascular: Appears well perfused  Respiratory: Normal work of breathing on room air  Gastrointestinal: Abdomen non-distended. Suprapubic catheter site and former TINY drain site are C/D/I.  : Clear yellow urine present in both urinary kent and suprapubic catheter drainage bags.  Neuro: Clear speech. No dyskinetic movement.   Extremities: BLE without significant appreciable edema.     New Labs/Imaging-   Results for orders placed or performed during the hospital encounter of 07/29/24 (from the past 24 hour(s))   Basic metabolic panel   Result Value Ref Range    Sodium 138 135 - 145 mmol/L    Potassium 4.5 3.4 - 5.3 mmol/L    Chloride 113 (H) 98 - 107 mmol/L    Carbon Dioxide (CO2) 20 (L)  22 - 29 mmol/L    Anion Gap 5 (L) 7 - 15 mmol/L    Urea Nitrogen 11.5 8.0 - 23.0 mg/dL    Creatinine 0.99 (H) 0.51 - 0.95 mg/dL    GFR Estimate 61 >60 mL/min/1.73m2    Calcium 8.1 (L) 8.8 - 10.4 mg/dL    Glucose 99 70 - 99 mg/dL   Extra Tube    Narrative    The following orders were created for panel order Extra Tube.  Procedure                               Abnormality         Status                     ---------                               -----------         ------                     Extra Purple Top Tube[[id number]]                            Final result                 Please view results for these tests on the individual orders.   Extra Purple Top Tube   Result Value Ref Range    Hold Specimen JI    Creatinine fluid   Result Value Ref Range    Creatinine Fluid Source Drain     Creatinine fluid 1.3 mg/dL    Narrative    No reference ranges have been established. This result should be interpreted in the context of the patient's clinical condition and compared to simultaneous measurement in the patient's blood. This is a lab developed test. It has not been cleared or approved by the FDA. FDA clearance is not required for clinical use.       Pending cultures (date obtained):   - Blood culture (7/29): NG x3D (final)  - Urine culture (7/29): negative (final)        ASSESSMENT:   69 year old female with serous uterine carcinoma s/p recent SATURNINO, SNEHA, SIENA, EDIE with Dr. Perez c/b cystotomy on 7/12/2024 with suprapubic and urethral catheters in place. Also underwent laser treatment for REMA-III in 2021 and remote pelvic radiation for cervical cancer. Readmitted to Gynecologic Oncology service 7/21/2024 for management of SBO and UTI, discharged to TCU 7/26/2024. Now re-presented to ED for evaluation of new/progressive neurological symptoms including confusion and dysarthria over the two days prior to admission. Also new dyskinetic movements appreciated on Gynecologic Oncology team evaluation that are new since recent  "admissions. Per Neurology consultation, \"Overall impression at this time is that this patient is experiencing medication toxicity in the setting of multiple CNS medications,\" exacerbated by worsening renal function. Symptoms gradually improving with discontinuation of suspected anti-dopaminergic and anti-cholinergic agents; restarting indicated medications under guidance of Neurology and Psychiatry consultants. After further discussion with psychiatry on 8/2, patient and daughter ultimately elected to discontinue essentially all PTA psychiatric medications; no concerns for schizophrenia exacerbation or increased paranoia at this time. Remains inpatient while awaiting TCU placement.    # Confusion, improved  # Dysarthria, resolved  # Dyskinesia, resolved  - Appreciate Neurology, Psychiatry recommendations:  - Initially held all medications that could be contributing to dystonic reaction, including: Seroquel, Tizanidine, Robaxin, Ambien, Cogentin, oxybutynin, oxycodone, gabapentin   - Patient ultimately declined recommended re-initiation of seroquel, gabapentin, zoloft.  - Scheduled melatonin at bedtime per Neurology (recently increased given trouble sleeping), delirium precautions     # Schizophrenia  # Depression  # Insomnia  - PTA Zoloft 150 mg, seroquel, gabapentin now discontinued per family and patient as above.Our recommendation is for reinitiation of these medications.   - Will continue to monitor patient for changes in mentation, evidence of response to internal stimuli, or paranoia. None apparent at this time.  - PTA vistaril held; Trazodone and PTA Ambien currently available PRN to support sleep but patient/family also declining these     # Intermittent hypotension, improved  # Supplemental O2 requirement, now weaned  - S/p NS bolus overnight HD#1-2 (total of 1L since presentation to ED)  - Although no clear localizing chest symptoms, consider chest imaging to evaluate for pneumonia if vital sign changes " recur. VSS overnight    # Recent UTI, negative urine culture (7/29)  # Recent superficial SSI  - s/p Zosyn x1 in ED 7/29  - completed Bactrim/Flagyl course on 7/31  - blood cultures collected in ED resulted negative     # Recent intraoperative cystotomy  # Suprapubic catheter, urethral kent catheter in place  - Per discussion with Urology: TINY drain removed; remove urethral kent if inpatient 8/11, plan suprapubic catheter management outpatient in Urology clinic  - Outpatient visit with Urology scheduled 8/7/2024; their team is working to reschedule this for next month  - Recent ~14d course of oxybutynin, held/will not resume given neurologic symptoms  - Pyridium prn inially available, held as she had not required this     # Cr elevation  # Possible underlying CKD  - Cr increased to 1.37 8/2, lasix held. Downtrended to 1.09> 0.99> 0.91  - Encouraged PO intake  - Continue to trend serum Cr, UOP  - Suspect Cr elevation constitutes prerenal MADHU d/t dehydration with disorientation  - If persistent c/f MADHU, further laboratory/imaging workup as indicated  - Consider Nephrology involvement pending clinical course     # Anemia  - Goal Hgb >7, continue to trend  - received 1u pRBCs 7/31 with appropriate Hgb rise    # Perianal skin breakdown  - WOC following     # Chronic pain  # Osteoarthritis  - Scheduled tylenol  - PTA initially held gabapentin held given neurologic symptoms, despite Neurology/Psychiatry recommendation to restart, patient/family declined  - PTA prn oxycodone also initially held but now restarted; lidocaine patches prn also available     # Hypertension  - PTA lasix held as above given Cr elevation   - Patient has been normotensive     # Atrial fibrillation  - Patient currently in sinus bradycardia  - Continue PTA atenolol   - Dose reduced from 100 to 25 mg daily given persistent hypotension and bradycardia, now improved     # History of gastric bypass  - Avoid NSAIDs     # Constipation  - Scheduled  Miralax     # Serous uterine carcinoma, s/p SNEHA/BSO  # History of REMA-III, s/p laser therapy  # History of cevical cancer, s/p radiation therapy  - Plan to follow-up outpatient in clinic with primary oncologist Dr. Perez for long term treatment planning     # Inpatient status  - PPX: PTA abixaban (on 28d post-op course to be completed 8/9), SCDs, IS; PPI now added for prolonged admission  - PT/OT following for deconditioning, dispo recommendations (TCU)  - SW consult placed for dispo planning; TCU placement pending/insurance status  - Pharmacy consult placed for reconciliation after presentation with polypharmacy     Anali Rodríguez MD, PGY-4  6:26 AM  [facility] Obstetrics & Gynecology Residency    Gyn Onc Pager: [phone]    Gynecologic Oncology Attending Attestation  I have seen the patient on rounds with the team. Doing well. TINY drain removed yesterday. Draining from both catheters. Incision healing well. Main concern is placement. Also family and patient decline reinitiating psychiatric medications. Currently no concerns. Will need a plan for chemotherapy initiation pending discharge planning, may need carboplatin/paclitaxel while inpatient.  I have discussed the patient and plan of care with the resident as documented in the note above, which I have edited as necessary.    Dany Perez MD    Gynecologic Oncology

## 2024-08-05 NOTE — PLAN OF CARE
"Goal Outcome Evaluation:  AVSS. A&Ox4. No pain or nausea. Ambien given prn for sleep. Sleeping comfortaby, shifting weight on her own. R PIV was leaking so a new PIV was placed. Continue with POC.      Problem: Adult Inpatient Plan of Care  Goal: Plan of Care Review  Description: The Plan of Care Review/Shift note should be completed every shift.  The Outcome Evaluation is a brief statement about your assessment that the patient is improving, declining, or no change.  This information will be displayed automatically on your shift  note.  Outcome: Progressing  Goal: Patient-Specific Goal (Individualized)  Description: You can add care plan individualizations to a care plan. Examples of Individualization might be:  \"Parent requests to be called daily at 9am for status\", \"I have a hard time hearing out of my right ear\", or \"Do not touch me to wake me up as it startles  me\".  Outcome: Progressing  Goal: Absence of Hospital-Acquired Illness or Injury  Outcome: Progressing  Intervention: Identify and Manage Fall Risk  Recent Flowsheet Documentation  Taken 8/5/2024 0404 by Mesha Feng, RN  Safety Promotion/Fall Prevention:   activity supervised   assistive device/personal items within reach   clutter free environment maintained   nonskid shoes/slippers when out of bed   mobility aid in reach   patient and family education   room near nurse's station   room organization consistent   safety round/check completed  Taken 8/5/2024 0248 by Mesha Feng, RN  Safety Promotion/Fall Prevention:   activity supervised   assistive device/personal items within reach   clutter free environment maintained   nonskid shoes/slippers when out of bed   mobility aid in reach   patient and family education   room near nurse's station   room organization consistent   safety round/check completed  Taken 8/5/2024 0000 by Mesha Feng, RN  Safety Promotion/Fall Prevention:   activity supervised   assistive device/personal items " within reach   clutter free environment maintained   nonskid shoes/slippers when out of bed   mobility aid in reach   patient and family education   room near nurse's station   room organization consistent   safety round/check completed  Intervention: Prevent Skin Injury  Recent Flowsheet Documentation  Taken 8/5/2024 0000 by Mesha Feng RN  Skin Protection: adhesive use limited  Device Skin Pressure Protection:   absorbent pad utilized/changed   tubing/devices free from skin contact  Taken 8/4/2024 2349 by Mesha Feng RN  Body Position: position changed independently  Intervention: Prevent Infection  Recent Flowsheet Documentation  Taken 8/5/2024 0000 by Mesha Feng RN  Infection Prevention:   environmental surveillance performed   hand hygiene promoted   personal protective equipment utilized   rest/sleep promoted   single patient room provided  Goal: Optimal Comfort and Wellbeing  Outcome: Progressing  Goal: Readiness for Transition of Care  Outcome: Progressing     Problem: Risk for Delirium  Goal: Optimal Coping  Outcome: Progressing  Intervention: Optimize Psychosocial Adjustment to Delirium  Recent Flowsheet Documentation  Taken 8/5/2024 0000 by Mesha Feng RN  Supportive Measures:   active listening utilized   decision-making supported   goal-setting facilitated   positive reinforcement provided   self-care encouraged  Goal: Improved Behavioral Control  Outcome: Progressing  Intervention: Prevent and Manage Agitation  Recent Flowsheet Documentation  Taken 8/5/2024 0000 by Mesha Feng RN  Environment Familiarity/Consistency: daily routine followed  Intervention: Minimize Safety Risk  Recent Flowsheet Documentation  Taken 8/5/2024 0000 by Mesha Feng RN  Communication Enhancement Strategies:   call light answered in person   extra time allowed for response  Enhanced Safety Measures:   pain management   room near unit station   review medications for side effects with  activity  Goal: Improved Attention and Thought Clarity  Outcome: Progressing  Intervention: Maximize Cognitive Function  Recent Flowsheet Documentation  Taken 8/5/2024 0000 by Mesha Feng RN  Sensory Stimulation Regulation: quiet environment promoted  Reorientation Measures:   familiar social contact encouraged   calendar in view   clock in view  Goal: Improved Sleep  Outcome: Progressing  Intervention: Promote Sleep  Recent Flowsheet Documentation  Taken 8/5/2024 0000 by Mesha Feng RN  Sleep/Rest Enhancement:   consistent schedule promoted   regular sleep/rest pattern promoted     Problem: Wound  Goal: Skin Health and Integrity  Outcome: Progressing  Intervention: Optimize Skin Protection  Recent Flowsheet Documentation  Taken 8/5/2024 0000 by Mesha Feng RN  Pressure Reduction Techniques: frequent weight shift encouraged  Skin Protection: adhesive use limited  Activity Management: activity adjusted per tolerance  Taken 8/4/2024 2349 by Mesha Feng RN  Activity Management: activity adjusted per tolerance  Head of Bed (HOB) Positioning: HOB at 30-45 degrees  Goal: Optimal Wound Healing  Outcome: Progressing  Intervention: Promote Wound Healing  Recent Flowsheet Documentation  Taken 8/5/2024 0000 by Mesha Feng RN  Sleep/Rest Enhancement:   consistent schedule promoted   regular sleep/rest pattern promoted

## 2024-08-05 NOTE — PROGRESS NOTES
Brief Gyn Onc Progress Note    At bedside for evening check-in with patient. She is resting comfortably. Inquires about flaky skin she noticed between her thighs when she touches/wipes this area. Inspected this area of concern, which appears white/scaly on the L>R inner thigh. The area is without erythema or drainage, or other signs concerning for skin/soft tissue infection. She has InterDry in place between her thighs to minimize irritation. WOC RN team is following. Provided reassurance that care team will continue to monitor this area and other skin concerns. Patient also shared that she has not been sleeping well after stopping all PTA sedating medications (some stopped per care team recommendation, others per patient/family preference). Consulting Neurology/Psychiatry teams recommended melatonin, and the patient is comfortable with us increasing the dosage of this tonight. She is also more open to trying PTA Ambien again, which she says has worked well for her in the past (conveyed to bedside RN).    Please page Gyn Onc Pager: [phone] for any questions or concerns overnight.    Anali Rodríguez MD, PGY-4  11:06 PM  [facility] Obstetrics & Gynecology Residency

## 2024-08-06 ENCOUNTER — APPOINTMENT (OUTPATIENT)
Dept: PHYSICAL THERAPY | Facility: CLINIC | Age: 69
DRG: 091 | End: 2024-08-06
Payer: COMMERCIAL

## 2024-08-06 PROCEDURE — 97116 GAIT TRAINING THERAPY: CPT | Mod: GP

## 2024-08-06 PROCEDURE — 99232 SBSQ HOSP IP/OBS MODERATE 35: CPT | Mod: 24 | Performed by: OBSTETRICS & GYNECOLOGY

## 2024-08-06 PROCEDURE — 120N000002 HC R&B MED SURG/OB UMMC

## 2024-08-06 PROCEDURE — 250N000013 HC RX MED GY IP 250 OP 250 PS 637

## 2024-08-06 PROCEDURE — 97530 THERAPEUTIC ACTIVITIES: CPT | Mod: GP

## 2024-08-06 RX ORDER — AMOXICILLIN 250 MG
1 CAPSULE ORAL 2 TIMES DAILY
Status: DISCONTINUED | OUTPATIENT
Start: 2024-08-06 | End: 2024-08-09 | Stop reason: HOSPADM

## 2024-08-06 RX ORDER — AMOXICILLIN 250 MG
1 CAPSULE ORAL 2 TIMES DAILY PRN
Status: DISCONTINUED | OUTPATIENT
Start: 2024-08-06 | End: 2024-08-06

## 2024-08-06 RX ADMIN — OXYCODONE HYDROCHLORIDE 5 MG: 5 TABLET ORAL at 16:51

## 2024-08-06 RX ADMIN — APIXABAN 2.5 MG: 2.5 TABLET, FILM COATED ORAL at 21:40

## 2024-08-06 RX ADMIN — SENNOSIDES AND DOCUSATE SODIUM 1 TABLET: 8.6; 5 TABLET ORAL at 21:40

## 2024-08-06 RX ADMIN — Medication 5 MG: at 21:40

## 2024-08-06 RX ADMIN — OXYCODONE HYDROCHLORIDE 5 MG: 5 TABLET ORAL at 09:24

## 2024-08-06 RX ADMIN — ATENOLOL 25 MG: 25 TABLET ORAL at 09:24

## 2024-08-06 RX ADMIN — APIXABAN 2.5 MG: 2.5 TABLET, FILM COATED ORAL at 09:24

## 2024-08-06 RX ADMIN — OXYCODONE HYDROCHLORIDE 5 MG: 5 TABLET ORAL at 01:30

## 2024-08-06 RX ADMIN — PANTOPRAZOLE SODIUM 40 MG: 40 TABLET, DELAYED RELEASE ORAL at 09:24

## 2024-08-06 RX ADMIN — SENNOSIDES AND DOCUSATE SODIUM 1 TABLET: 8.6; 5 TABLET ORAL at 11:27

## 2024-08-06 RX ADMIN — ACETAMINOPHEN 650 MG: 325 TABLET, FILM COATED ORAL at 09:24

## 2024-08-06 RX ADMIN — POLYETHYLENE GLYCOL 3350 17 G: 17 POWDER, FOR SOLUTION ORAL at 09:24

## 2024-08-06 ASSESSMENT — ACTIVITIES OF DAILY LIVING (ADL)
ADLS_ACUITY_SCORE: 42

## 2024-08-06 NOTE — PLAN OF CARE
Goal Outcome Evaluation:      Plan of Care Reviewed With: patient    Overall Patient Progress: improvingOverall Patient Progress: improving    Outcome Evaluation: [id number]    AVSS. Pain 6-8/10 to abdomen. PRN oxycodone given x1 with relief. Denies nausea/vomiting and SOB. Up SBA-1 with walker. Poor PO intake. Pt feeling constipated and has been taking scheduled miralax. Provider notified and senna ordered. Shahid and suprapubic catheter remain in place with adequate output. WOC dressing changes done to buttocks and pannus per POC. No acute events this shift. Continue with POC.

## 2024-08-06 NOTE — PLAN OF CARE
Goal Outcome Evaluation: HD# 9. Pt afebrile, vitals stable. Pt slept at long intervals tonight. Med x1 for abd pain with oxycodone 5mg x1. Refused any pain med this am. Abd round, +bs, Lungs clear but dim in bases. Suprapubic cath and kent both with adeq uo. PIV x2 saline locked. No confusion noted. Pt A/O x4.

## 2024-08-06 NOTE — PLAN OF CARE
Goal Outcome Evaluation:      Plan of Care Reviewed With: patient    Overall Patient Progress: improving    A&Ox4, AVSS, RA. Pain 6/10 in abdomen, given oxy x1, refuses scheduled tylenol. PIV x2 SL. Suprapubic catheter & kent catheter both in place w/ AUOP. C/o constipation, given senna, (+) flatus. Ax1 w/ walker. Abd skin fold dressing intact. Sacral wound care BID, mepilex in place on coccyx. Regular diet, tolerating well, denies N/V. Discharge pending insurance auth. Continue w/ POC.

## 2024-08-06 NOTE — PROGRESS NOTES
Brief Gyn Onc Progress Note    At bedside for evening check-in with patient. She is currently on a phone call, so will attempt to see again as able before bedtime to address any needs and/or questions.    Bedside RN provided update that a small amount of serosanguinous fluid of unclear origin dripped onto the floor the last time the patient was up out of bed. Dressing over TINY drain removal site was reportedly C/D/I at this time, and both urethral kent catheter and suprapubic catheter are draining urine appropriately. Will continue to monitor overnight.    Please page Gyn Onc Pager: [phone] for any questions or concerns overnight.     Anali Rodríguez MD, PGY-4  9:30 PM  [facility] Obstetrics & Gynecology Residency    --    2220: Re-presented to bedside for evening check in. Patient is still on the phone. Says she does not need anything right now. On-call G4 resident from primary team available should this change overnight.    Anali Rodríguez MD, PGY-4  10:30 PM  [facility] Obstetrics & Gynecology Residency

## 2024-08-06 NOTE — PLAN OF CARE
Pt A/Ox4. VSS on RA. Up assist 1 with walker. Declined to go on a walk this evening stating she will go tomorrow. Regular diet, tolerating well. Pain controlled with PRN oxycodone x2. Gluteal wound care completed per orders. Shahid and SP cath in place with adequate urine output. No BM on shift. PIV x2 SL. Several drops of serosanguinous fluid noted on floor after pt got up, no new s/sx, provider aware.

## 2024-08-06 NOTE — PROGRESS NOTES
Care Management Follow Up    Length of Stay (days): 7    Expected Discharge Date: 08/12/2024     Concerns to be Addressed: discharge planning   Patient plan of care discussed at interdisciplinary rounds: Yes    Anticipated Discharge Disposition: Skilled Nursing Facility, Transitional Care     Anticipated Discharge Services:    Anticipated Discharge DME: None    Patient/family educated on Medicare website which has current facility and service quality ratings: yes  Education Provided on the Discharge Plan: Yes  Patient/Family in Agreement with the Plan: yes    Referrals Placed by CM/SW: Post Acute Facilities  Private pay costs discussed: Not applicable    Additional Information:  SW reviewed notes and attended rounds. Per MD, gyn/onc, pt is medically ready. Pt's insurance is currently inactive due to pt not completing renewal. Pt's daughter faxed in the renewal  forms on 8/5/24.   CHW will follow up and resend referrals to facilities close to [place] once daughter, Joy, or pt receives a verification that the county received the renewal.    Social work will continue to follow and provide assistance to ensure a safe and timely discharge   BRIANNA Sanderson   5A Oncology beds:5220-40 & 5C  beds 5417-32 (non BMT )      Vocera:  Phone: [phone]  Pager: [phone]

## 2024-08-07 ENCOUNTER — APPOINTMENT (OUTPATIENT)
Dept: PHYSICAL THERAPY | Facility: CLINIC | Age: 69
DRG: 091 | End: 2024-08-07
Payer: COMMERCIAL

## 2024-08-07 PROCEDURE — 99231 SBSQ HOSP IP/OBS SF/LOW 25: CPT | Mod: 24 | Performed by: OBSTETRICS & GYNECOLOGY

## 2024-08-07 PROCEDURE — 250N000013 HC RX MED GY IP 250 OP 250 PS 637

## 2024-08-07 PROCEDURE — 97530 THERAPEUTIC ACTIVITIES: CPT | Mod: GP | Performed by: PHYSICAL THERAPIST

## 2024-08-07 PROCEDURE — 97110 THERAPEUTIC EXERCISES: CPT | Mod: GP | Performed by: PHYSICAL THERAPIST

## 2024-08-07 PROCEDURE — 120N000002 HC R&B MED SURG/OB UMMC

## 2024-08-07 RX ADMIN — APIXABAN 2.5 MG: 2.5 TABLET, FILM COATED ORAL at 21:25

## 2024-08-07 RX ADMIN — ATENOLOL 25 MG: 25 TABLET ORAL at 08:10

## 2024-08-07 RX ADMIN — APIXABAN 2.5 MG: 2.5 TABLET, FILM COATED ORAL at 08:09

## 2024-08-07 RX ADMIN — OXYCODONE HYDROCHLORIDE 5 MG: 5 TABLET ORAL at 08:09

## 2024-08-07 RX ADMIN — PANTOPRAZOLE SODIUM 40 MG: 40 TABLET, DELAYED RELEASE ORAL at 08:09

## 2024-08-07 RX ADMIN — OXYCODONE HYDROCHLORIDE 5 MG: 5 TABLET ORAL at 00:27

## 2024-08-07 RX ADMIN — Medication 5 MG: at 21:25

## 2024-08-07 RX ADMIN — POLYETHYLENE GLYCOL 3350 17 G: 17 POWDER, FOR SOLUTION ORAL at 08:10

## 2024-08-07 RX ADMIN — OXYCODONE HYDROCHLORIDE 5 MG: 5 TABLET ORAL at 21:25

## 2024-08-07 RX ADMIN — OXYCODONE HYDROCHLORIDE 5 MG: 5 TABLET ORAL at 16:25

## 2024-08-07 RX ADMIN — SENNOSIDES AND DOCUSATE SODIUM 1 TABLET: 8.6; 5 TABLET ORAL at 08:09

## 2024-08-07 RX ADMIN — SENNOSIDES AND DOCUSATE SODIUM 1 TABLET: 8.6; 5 TABLET ORAL at 21:25

## 2024-08-07 ASSESSMENT — ACTIVITIES OF DAILY LIVING (ADL)
ADLS_ACUITY_SCORE: 40
ADLS_ACUITY_SCORE: 42

## 2024-08-07 NOTE — PROGRESS NOTES
CLINICAL NUTRITION SERVICES - REASSESSMENT NOTE     Nutrition Prescription    RECOMMENDATIONS FOR MDs/PROVIDERS TO ORDER:  None at this time     Malnutrition Status:    Patient does not meet two of the established criteria necessary for diagnosing malnutrition but is at risk for malnutrition    Recommendations already ordered by Registered Dietitian (RD):  Continue Ensure Enlive between meals    Future/Additional Recommendations:  Monitor PO intakes, wt trends; inpatient RD will continue to follow per protocol     EVALUATION OF THE PROGRESS TOWARD GOALS   Diet: Regular  - Supplements: Ensure Enlive with meals    Intake: Eating mostly 50% of meals documented in flowsheets the past week, at times 0-25%.  Tolerating diet well per review of progress notes. Ordering mostly 1-2 meals/day the past week from kitchen (no meals ordered on 8/4 -however RN note noted fair appetite that day), unsure if pt eating food from outside of hospital as well.     NEW FINDINGS   Weight: lowest wts this admit of 151 lb on 8/2 is down 10 lb from ~2 months ago (6%) and down 14 lb from ~3 months ago (8%)  Date/Time Weight Weight Method   08/06/24 1101 69.5 kg (153 lb 3.2 oz) Standing scale   08/05/24 1046 70.9 kg (156 lb 3.2 oz) Standing scale   08/04/24 0741 76.2 kg (167 lb 15.9 oz) Bed scale   08/03/24 0849 76.1 kg (167 lb 12.3 oz) Bed scale   08/02/24 0833 68.6 kg (151 lb 3.2 oz) Standing scale   08/01/24 0936 78.9 kg (173 lb 15.1 oz) Bed scale     Wt Readings from Last 10 Encounters:   08/06/24 69.5 kg (153 lb 3.2 oz)   07/25/24 75 kg (165 lb 6.4 oz)   07/15/24 81.3 kg (179 lb 3.7 oz)   05/28/24 73 kg (161 lb)   05/17/24 74.4 kg (164 lb 0.4 oz)   05/15/24 75.3 kg (166 lb 1.6 oz)   05/15/24 74.8 kg (165 lb)   04/23/24 76.2 kg (168 lb)   04/11/24 76.1 kg (167 lb 12.8 oz)   02/28/24 75.9 kg (167 lb 6.4 oz)     MALNUTRITION  % Intake: <75% for > 7 days (moderate)  % Weight Loss: > 7.5% in 3 months (severe)  Subcutaneous Fat Loss: None  observed per RD note on 7/31  Muscle Loss: None observed per RD note on 7/31  Fluid Accumulation/Edema: None noted per provider note today  Malnutrition Diagnosis: Patient does not meet two of the established criteria necessary for diagnosing malnutrition but is at risk for malnutrition     Previous Goals   Patient to consume % of nutritionally adequate meal trays TID, or the equivalent with supplements/snacks   Evaluation: Difficult to accurately evaluate    Previous Nutrition Diagnosis  Increased nutrient needs energy/protein related to skin injuries, s/p surgery as evidenced by increased calorie/protein needs     Evaluation: No change, update    CURRENT NUTRITION DIAGNOSIS  Inadequate oral intake related to suspect variable appetite as evidenced by eating mostly 50% of meals documented in flowsheets the past week, at times 0-25%      INTERVENTIONS  Implementation  Chart review, pt with other cares during attempts to visit    Goals  Patient to consume % of nutritionally adequate meal trays TID, or the equivalent with supplements/snacks.    Monitoring/Evaluation  Progress toward goals will be monitored and evaluated per protocol.     Temitope Vasquez, RD, , LD  Weekday Units covered: 5A (non-Heme Onc pts) and 7B ([id number])  Available by 5A or 7B Clinical Dietjose Garcia  Weekend Coverage: Weekend Clinical Dietjose Garcia    Clinical Dietitians no longer available via paging

## 2024-08-07 NOTE — PROGRESS NOTES
[id number] Aox4. VSS. On RA. Abdominal pain managed with prn oxycodone. Denies nausea. Lower abdominal wound cares completed per POC. Suprapubic and kent catheter with urine, AUOP. BM x1 today. PIV Sl'd. Tolerating diet. Up with A1, GB and a walker.

## 2024-08-07 NOTE — PROGRESS NOTES
Brief Gyn Onc Progress Note    At bedside for evening check-in with patient. She is sleeping soundly and was thus not disturbed. Bedside RN not immediately available, but no acute concerns per charge RN.    Please contact the primary team via the Gyn Onc Pager: [phone] with any questions or concerns overnight.     Anali Rodríguez MD, PGY-4  9:43 PM  [facility] Obstetrics & Gynecology Residency

## 2024-08-07 NOTE — PLAN OF CARE
Goal Outcome Evaluation:      Plan of Care Reviewed With: patient    Overall Patient Progress: improvingOverall Patient Progress: improving     /76 (BP Location: Right arm)   Pulse 63   Temp 97.7  F (36.5  C) (Oral)   Resp 18   Wt 69.5 kg (153 lb 3.2 oz)   SpO2 100%   BMI 29.30 kg/m      Neuro: A&Ox4.   Cardiac: Afebrile, VSS.   Respiratory: RA   GI/: eknt and SPC in place. Kent bag leaked. Passing gas. BM x1  Diet/appetite: Tolerating diet. Denies nausea   Activity: Up with stand by assist    Pain: .pain maintained at tolerable level with prn oxycodone  Skin: dressings CDI.  Lines: piv x2: sl'd  Drains: PSC and kent.   Replacement: No RN managed replacement protocols in place    Rested btwn cares. Able to make needs known. Continue to monitor. Notify MD of changes/concerns

## 2024-08-07 NOTE — PROGRESS NOTES
GYN ONC PROGRESS NOTE  08/07/2024    HD#10 new neurologic symptoms (confusion, dysarthria/dyskinesia)  Disease: Serous uterine carcinoma s/p XL, SNEHA, BSO, EDIE and cystotomy repair with suprapubic bladder insertion and omental flap on 7/12/24. TINY drain in place. Hx REMA-III s/p laser tx. Remote hx cervical cancer s/p radiation.    24 hour events:   - SW beginning to initiate TCU referrals while waiting for insurance reactivation  - Reconfirmed code status: Full (ordered)    SUBJECTIVE:   Patient slept soundly overnight, still dozing at the time of pre-rounds. No new symptoms, questions, or other concerns.    OBJECTIVE:   Patient Vitals for the past 24 hrs:   BP Temp Temp src Pulse Resp SpO2 Weight   08/07/24 0011 126/76 97.7  F (36.5  C) Oral 63 18 100 % --   08/06/24 2033 133/71 97.9  F (36.6  C) Oral 61 16 100 % --   08/06/24 1427 123/71 98.3  F (36.8  C) Oral 63 18 100 % --   08/06/24 1101 126/76 97.9  F (36.6  C) Oral 65 20 100 % 69.5 kg (153 lb 3.2 oz)   08/06/24 0847 123/68 97.8  F (36.6  C) Oral 54 18 100 % --     Constitutional: chronically ill-appearing female in no acute distress, resting in bed  Cardiovascular: Appears well perfused  Respiratory: Normal work of breathing on room air  Gastrointestinal: Abdomen non-distended. Suprapubic catheter site and former TINY drain site are C/D/I.  : Clear yellow urine present in both urinary kent and suprapubic catheter drainage bags.  Neuro: Clear speech. No dyskinetic movement.   Extremities: BLE without significant appreciable edema.     New Labs/Imaging-    Latest Reference Range & Units 08/01/24 05:54 08/02/24 06:05 08/03/24 05:13 08/04/24 05:57 08/05/24 05:29   Creatinine 0.51 - 0.95 mg/dL 1.19 (H) 1.37 (H) 1.09 (H) 0.99 (H) 0.91     Pending cultures (date obtained):   - Blood culture (7/29): no growth (final)  - Urine culture (7/29): no growth (final)        ASSESSMENT:   69 year old female with serous uterine carcinoma s/p recent SNEHA MATAMOROS, SIENA, EDIE with   "[place] c/b cystotomy on 7/12/2024 with suprapubic and urethral catheters in place. Also underwent laser treatment for REMA-III in 2021 and remote pelvic radiation for cervical cancer. Readmitted to Gynecologic Oncology service 7/21/2024 for management of SBO and UTI, discharged to TCU 7/26/2024. Now re-presented to ED for evaluation of new/progressive neurological symptoms including confusion and dysarthria over the two days prior to admission. Also new dyskinetic movements appreciated on Gynecologic Oncology team evaluation that are new since recent admissions. Per Neurology consultation, \"Overall impression at this time is that this patient is experiencing medication toxicity in the setting of multiple CNS medications,\" exacerbated by worsening renal function. Symptoms gradually improving with discontinuation of suspected anti-dopaminergic and anti-cholinergic agents; restarting indicated medications under guidance of Neurology and Psychiatry consultants. After further discussion with psychiatry on 8/2, patient and daughter ultimately elected to discontinue essentially all PTA psychiatric medications; no concerns for schizophrenia exacerbation or increased paranoia at this time. Remains inpatient while awaiting TCU placement.    # Confusion, improved  # Dysarthria, resolved  # Dyskinesia, resolved  - Appreciate Neurology, Psychiatry recommendations:  - Initially held all medications that could be contributing to dystonic reaction, including: Seroquel, Tizanidine, Robaxin, Ambien, Cogentin, oxybutynin, oxycodone, gabapentin   - Patient ultimately declined recommended re-initiation of seroquel, gabapentin, zoloft.  - Scheduled melatonin at bedtime per Neurology (recently increased given trouble sleeping), delirium precautions     # Schizophrenia  # Depression  # Insomnia  - PTA Zoloft 150 mg, seroquel, gabapentin now discontinued per family and patient as above. Our recommendation is for reinitiation of these " medications.   - Will continue to monitor patient for changes in mentation, evidence of response to internal stimuli, or paranoia. None apparent at this time.  - PTA vistaril held; Trazodone and PTA Ambien currently available PRN to support sleep but patient/family also declining these   - Patient did accept Ambien 10 mg to promote sleep overnight 8/4-8/5 with good effect     # Intermittent hypotension, improved  # Supplemental O2 requirement, now weaned  - S/p NS bolus overnight HD#1-2 (total of 1L since presentation to ED)  - Although no clear localizing chest symptoms, consider chest imaging to evaluate for pneumonia if vital sign changes recur. VSS overnight.    # Recent UTI, negative urine culture (7/29)  # Recent superficial SSI  - s/p Zosyn x1 in ED 7/29  - completed Bactrim/Flagyl course on 7/31  - blood cultures collected in ED resulted negative     # Recent intraoperative cystotomy  # Suprapubic catheter, urethral kent catheter in place  - Per discussion with Urology: TINY drain removed; remove urethral kent if inpatient 8/11, plan suprapubic catheter management outpatient in Urology clinic  - Outpatient visit with Urology scheduled 8/7/2024; their team is working to reschedule this for next month  - Recent ~14d course of oxybutynin, held/will not resume given neurologic symptoms  - Pyridium prn inially available, held as she had not required this     # Cr elevation  # Possible underlying CKD  - Cr increased to 1.37 8/2, lasix held. Downtrended to 1.09> 0.99> 0.91  - Continue to trend serum UOP, Cr as indicated  - Suspect Cr elevation constitutes prerenal MADHU d/t dehydration with disorientation   - Consider to encourage PO intake  - If persistent c/f MADHU, further laboratory/imaging workup as indicated  - Consider Nephrology involvement pending clinical course     # Anemia  - Goal Hgb >7, continue to trend as indicated  - received 1u pRBCs 7/31 with appropriate Hgb rise    # Perianal skin breakdown  - WO  following     # Chronic pain  # Osteoarthritis  - Scheduled tylenol  - PTA initially held gabapentin held given neurologic symptoms, despite Neurology/Psychiatry recommendation to restart, patient/family declined  - PTA prn oxycodone also initially held but now restarted; lidocaine patches prn also available     # Hypertension  - PTA lasix held as above given Cr elevation   - Patient has been normotensive     # Atrial fibrillation  - Patient currently in sinus bradycardia  - Continue PTA atenolol   - Dose reduced from 100 to 25 mg daily given persistent hypotension and bradycardia, now improved     # History of gastric bypass  - Avoid NSAIDs     # Constipation  - Scheduled Miralax     # Serous uterine carcinoma, s/p SNEHA/BSO  # History of REMA-III, s/p laser therapy  # History of cevical cancer, s/p radiation therapy  - Plan to follow-up outpatient in clinic with primary oncologist Dr. Perez for long term treatment planning  - Will need a plan for chemotherapy initiation pending discharge planning, may need carboplatin/paclitaxel while inpatient     # Inpatient status  - PPX: PTA abixaban (on 28d post-op course to be completed 8/9; will continue while inpatient), SCDs, IS; PPI now added for prolonged admission  - PT/OT following for deconditioning, dispo recommendations (TCU)  - SW consult placed for dispo planning; TCU placement pending/insurance status  - Pharmacy consult placed for reconciliation after presentation with polypharmacy     Anali Rodríguez MD, PGY-4  6:41 AM  [facility] Obstetrics & Gynecology Residency    Gyn Onc Pager: [phone]    Gynecologic Oncology Attending Attestation  I have seen the patient on rounds with the team. Doing well. No changes.  I have discussed the patient and plan of care with the resident as documented in the note above, which I have edited as necessary.    Dany Perez MD    Gynecologic Oncology

## 2024-08-08 ENCOUNTER — DOCUMENTATION ONLY (OUTPATIENT)
Dept: OTHER | Facility: CLINIC | Age: 69
End: 2024-08-08
Payer: COMMERCIAL

## 2024-08-08 PROBLEM — C54.9 CARCINOSARCOMA OF BODY OF UTERUS (H): Status: ACTIVE | Noted: 2024-08-08

## 2024-08-08 LAB
ALBUMIN SERPL BCG-MCNC: 2.6 G/DL (ref 3.5–5.2)
ALBUMIN SERPL BCG-MCNC: 2.7 G/DL (ref 3.5–5.2)
ALP SERPL-CCNC: 68 U/L (ref 40–150)
ALP SERPL-CCNC: 75 U/L (ref 40–150)
ALT SERPL W P-5'-P-CCNC: 8 U/L (ref 0–50)
ALT SERPL W P-5'-P-CCNC: <5 U/L (ref 0–50)
ANION GAP SERPL CALCULATED.3IONS-SCNC: 6 MMOL/L (ref 7–15)
ANION GAP SERPL CALCULATED.3IONS-SCNC: 8 MMOL/L (ref 7–15)
AST SERPL W P-5'-P-CCNC: 17 U/L (ref 0–45)
AST SERPL W P-5'-P-CCNC: 18 U/L (ref 0–45)
BASOPHILS # BLD AUTO: 0 10E3/UL (ref 0–0.2)
BASOPHILS # BLD AUTO: 0.1 10E3/UL (ref 0–0.2)
BASOPHILS NFR BLD AUTO: 1 %
BASOPHILS NFR BLD AUTO: 1 %
BILIRUB SERPL-MCNC: 0.3 MG/DL
BILIRUB SERPL-MCNC: 0.5 MG/DL
BUN SERPL-MCNC: 8.2 MG/DL (ref 8–23)
BUN SERPL-MCNC: 8.9 MG/DL (ref 8–23)
CALCIUM SERPL-MCNC: 8.4 MG/DL (ref 8.8–10.4)
CALCIUM SERPL-MCNC: 8.7 MG/DL (ref 8.8–10.4)
CHLORIDE SERPL-SCNC: 108 MMOL/L (ref 98–107)
CHLORIDE SERPL-SCNC: 109 MMOL/L (ref 98–107)
CREAT SERPL-MCNC: 0.83 MG/DL (ref 0.51–0.95)
CREAT SERPL-MCNC: 0.86 MG/DL (ref 0.51–0.95)
EGFRCR SERPLBLD CKD-EPI 2021: 72 ML/MIN/1.73M2
EGFRCR SERPLBLD CKD-EPI 2021: 75 ML/MIN/1.73M2
EOSINOPHIL # BLD AUTO: 0.1 10E3/UL (ref 0–0.7)
EOSINOPHIL # BLD AUTO: 0.1 10E3/UL (ref 0–0.7)
EOSINOPHIL NFR BLD AUTO: 1 %
EOSINOPHIL NFR BLD AUTO: 1 %
ERYTHROCYTE [DISTWIDTH] IN BLOOD BY AUTOMATED COUNT: 27.2 % (ref 10–15)
ERYTHROCYTE [DISTWIDTH] IN BLOOD BY AUTOMATED COUNT: 27.6 % (ref 10–15)
GLUCOSE SERPL-MCNC: 102 MG/DL (ref 70–99)
GLUCOSE SERPL-MCNC: 96 MG/DL (ref 70–99)
HCO3 SERPL-SCNC: 20 MMOL/L (ref 22–29)
HCO3 SERPL-SCNC: 22 MMOL/L (ref 22–29)
HCT VFR BLD AUTO: 28.2 % (ref 35–47)
HCT VFR BLD AUTO: 36.2 % (ref 35–47)
HGB BLD-MCNC: 10.5 G/DL (ref 11.7–15.7)
HGB BLD-MCNC: 8.7 G/DL (ref 11.7–15.7)
IMM GRANULOCYTES # BLD: 0 10E3/UL
IMM GRANULOCYTES # BLD: 0 10E3/UL
IMM GRANULOCYTES NFR BLD: 1 %
IMM GRANULOCYTES NFR BLD: 1 %
LYMPHOCYTES # BLD AUTO: 1.8 10E3/UL (ref 0.8–5.3)
LYMPHOCYTES # BLD AUTO: 1.9 10E3/UL (ref 0.8–5.3)
LYMPHOCYTES NFR BLD AUTO: 29 %
LYMPHOCYTES NFR BLD AUTO: 30 %
MAGNESIUM SERPL-MCNC: 1.7 MG/DL (ref 1.7–2.3)
MAGNESIUM SERPL-MCNC: 1.8 MG/DL (ref 1.7–2.3)
MCH RBC QN AUTO: 25.2 PG (ref 26.5–33)
MCH RBC QN AUTO: 25.5 PG (ref 26.5–33)
MCHC RBC AUTO-ENTMCNC: 29 G/DL (ref 31.5–36.5)
MCHC RBC AUTO-ENTMCNC: 30.9 G/DL (ref 31.5–36.5)
MCV RBC AUTO: 83 FL (ref 78–100)
MCV RBC AUTO: 87 FL (ref 78–100)
MONOCYTES # BLD AUTO: 0.6 10E3/UL (ref 0–1.3)
MONOCYTES # BLD AUTO: 0.6 10E3/UL (ref 0–1.3)
MONOCYTES NFR BLD AUTO: 10 %
MONOCYTES NFR BLD AUTO: 9 %
NEUTROPHILS # BLD AUTO: 3.3 10E3/UL (ref 1.6–8.3)
NEUTROPHILS # BLD AUTO: 4 10E3/UL (ref 1.6–8.3)
NEUTROPHILS NFR BLD AUTO: 57 %
NEUTROPHILS NFR BLD AUTO: 59 %
NRBC # BLD AUTO: 0 10E3/UL
NRBC # BLD AUTO: 0 10E3/UL
NRBC BLD AUTO-RTO: 0 /100
NRBC BLD AUTO-RTO: 0 /100
PLATELET # BLD AUTO: 208 10E3/UL (ref 150–450)
PLATELET # BLD AUTO: 233 10E3/UL (ref 150–450)
POTASSIUM SERPL-SCNC: 4 MMOL/L (ref 3.4–5.3)
POTASSIUM SERPL-SCNC: 4.1 MMOL/L (ref 3.4–5.3)
PROT SERPL-MCNC: 5.6 G/DL (ref 6.4–8.3)
PROT SERPL-MCNC: 6.3 G/DL (ref 6.4–8.3)
RBC # BLD AUTO: 3.41 10E6/UL (ref 3.8–5.2)
RBC # BLD AUTO: 4.17 10E6/UL (ref 3.8–5.2)
SODIUM SERPL-SCNC: 136 MMOL/L (ref 135–145)
SODIUM SERPL-SCNC: 137 MMOL/L (ref 135–145)
WBC # BLD AUTO: 5.8 10E3/UL (ref 4–11)
WBC # BLD AUTO: 6.6 10E3/UL (ref 4–11)

## 2024-08-08 PROCEDURE — 36415 COLL VENOUS BLD VENIPUNCTURE: CPT | Performed by: NURSE PRACTITIONER

## 2024-08-08 PROCEDURE — 85025 COMPLETE CBC W/AUTO DIFF WBC: CPT | Performed by: OBSTETRICS & GYNECOLOGY

## 2024-08-08 PROCEDURE — 250N000013 HC RX MED GY IP 250 OP 250 PS 637

## 2024-08-08 PROCEDURE — 83735 ASSAY OF MAGNESIUM: CPT | Performed by: OBSTETRICS & GYNECOLOGY

## 2024-08-08 PROCEDURE — 36415 COLL VENOUS BLD VENIPUNCTURE: CPT | Performed by: OBSTETRICS & GYNECOLOGY

## 2024-08-08 PROCEDURE — 84450 TRANSFERASE (AST) (SGOT): CPT | Performed by: NURSE PRACTITIONER

## 2024-08-08 PROCEDURE — 120N000002 HC R&B MED SURG/OB UMMC

## 2024-08-08 PROCEDURE — 85004 AUTOMATED DIFF WBC COUNT: CPT | Performed by: NURSE PRACTITIONER

## 2024-08-08 PROCEDURE — 84450 TRANSFERASE (AST) (SGOT): CPT | Performed by: OBSTETRICS & GYNECOLOGY

## 2024-08-08 PROCEDURE — 83735 ASSAY OF MAGNESIUM: CPT | Performed by: NURSE PRACTITIONER

## 2024-08-08 PROCEDURE — 84155 ASSAY OF PROTEIN SERUM: CPT | Performed by: OBSTETRICS & GYNECOLOGY

## 2024-08-08 PROCEDURE — 84155 ASSAY OF PROTEIN SERUM: CPT | Performed by: NURSE PRACTITIONER

## 2024-08-08 PROCEDURE — G0463 HOSPITAL OUTPT CLINIC VISIT: HCPCS

## 2024-08-08 PROCEDURE — 99232 SBSQ HOSP IP/OBS MODERATE 35: CPT | Mod: 24 | Performed by: OBSTETRICS & GYNECOLOGY

## 2024-08-08 RX ORDER — ALBUTEROL SULFATE 90 UG/1
1-2 AEROSOL, METERED RESPIRATORY (INHALATION)
Status: DISCONTINUED | OUTPATIENT
Start: 2024-08-08 | End: 2024-08-09 | Stop reason: HOSPADM

## 2024-08-08 RX ORDER — HEPARIN SODIUM (PORCINE) LOCK FLUSH IV SOLN 100 UNIT/ML 100 UNIT/ML
5 SOLUTION INTRAVENOUS
Status: DISCONTINUED | OUTPATIENT
Start: 2024-08-08 | End: 2024-08-09 | Stop reason: HOSPADM

## 2024-08-08 RX ORDER — LORAZEPAM 2 MG/ML
0.5 INJECTION INTRAMUSCULAR EVERY 4 HOURS PRN
Status: DISCONTINUED | OUTPATIENT
Start: 2024-08-08 | End: 2024-08-09 | Stop reason: HOSPADM

## 2024-08-08 RX ORDER — DIPHENHYDRAMINE HYDROCHLORIDE 50 MG/ML
50 INJECTION INTRAMUSCULAR; INTRAVENOUS
Status: DISCONTINUED | OUTPATIENT
Start: 2024-08-08 | End: 2024-08-09 | Stop reason: HOSPADM

## 2024-08-08 RX ORDER — HEPARIN SODIUM,PORCINE 10 UNIT/ML
5-20 VIAL (ML) INTRAVENOUS DAILY PRN
Status: DISCONTINUED | OUTPATIENT
Start: 2024-08-08 | End: 2024-08-09 | Stop reason: HOSPADM

## 2024-08-08 RX ORDER — ALBUTEROL SULFATE 0.83 MG/ML
2.5 SOLUTION RESPIRATORY (INHALATION)
Status: DISCONTINUED | OUTPATIENT
Start: 2024-08-08 | End: 2024-08-09 | Stop reason: HOSPADM

## 2024-08-08 RX ORDER — METHYLPREDNISOLONE SODIUM SUCCINATE 125 MG/2ML
125 INJECTION, POWDER, LYOPHILIZED, FOR SOLUTION INTRAMUSCULAR; INTRAVENOUS
Status: DISCONTINUED | OUTPATIENT
Start: 2024-08-08 | End: 2024-08-09 | Stop reason: HOSPADM

## 2024-08-08 RX ORDER — ONDANSETRON 8 MG/1
16 TABLET, FILM COATED ORAL ONCE
Status: COMPLETED | OUTPATIENT
Start: 2024-08-09 | End: 2024-08-09

## 2024-08-08 RX ORDER — EPINEPHRINE 1 MG/ML
0.3 INJECTION, SOLUTION, CONCENTRATE INTRAVENOUS EVERY 5 MIN PRN
Status: DISCONTINUED | OUTPATIENT
Start: 2024-08-08 | End: 2024-08-09 | Stop reason: HOSPADM

## 2024-08-08 RX ORDER — MEPERIDINE HYDROCHLORIDE 25 MG/ML
25 INJECTION INTRAMUSCULAR; INTRAVENOUS; SUBCUTANEOUS EVERY 30 MIN PRN
Status: DISCONTINUED | OUTPATIENT
Start: 2024-08-08 | End: 2024-08-09 | Stop reason: HOSPADM

## 2024-08-08 RX ORDER — DIPHENHYDRAMINE HCL 25 MG
50 CAPSULE ORAL ONCE
Status: COMPLETED | OUTPATIENT
Start: 2024-08-09 | End: 2024-08-09

## 2024-08-08 RX ADMIN — POLYETHYLENE GLYCOL 3350 17 G: 17 POWDER, FOR SOLUTION ORAL at 08:14

## 2024-08-08 RX ADMIN — ATENOLOL 25 MG: 25 TABLET ORAL at 08:14

## 2024-08-08 RX ADMIN — SENNOSIDES AND DOCUSATE SODIUM 1 TABLET: 8.6; 5 TABLET ORAL at 08:13

## 2024-08-08 RX ADMIN — OXYCODONE HYDROCHLORIDE 5 MG: 5 TABLET ORAL at 08:14

## 2024-08-08 RX ADMIN — OXYCODONE HYDROCHLORIDE 5 MG: 5 TABLET ORAL at 21:39

## 2024-08-08 RX ADMIN — Medication 5 MG: at 21:38

## 2024-08-08 RX ADMIN — APIXABAN 2.5 MG: 2.5 TABLET, FILM COATED ORAL at 21:39

## 2024-08-08 RX ADMIN — SENNOSIDES AND DOCUSATE SODIUM 1 TABLET: 8.6; 5 TABLET ORAL at 21:39

## 2024-08-08 RX ADMIN — OXYCODONE HYDROCHLORIDE 5 MG: 5 TABLET ORAL at 01:39

## 2024-08-08 RX ADMIN — OXYCODONE HYDROCHLORIDE 5 MG: 5 TABLET ORAL at 17:10

## 2024-08-08 RX ADMIN — PANTOPRAZOLE SODIUM 40 MG: 40 TABLET, DELAYED RELEASE ORAL at 08:14

## 2024-08-08 RX ADMIN — APIXABAN 2.5 MG: 2.5 TABLET, FILM COATED ORAL at 08:13

## 2024-08-08 RX ADMIN — ACETAMINOPHEN 650 MG: 325 TABLET, FILM COATED ORAL at 21:38

## 2024-08-08 ASSESSMENT — ACTIVITIES OF DAILY LIVING (ADL)
ADLS_ACUITY_SCORE: 39
ADLS_ACUITY_SCORE: 40
ADLS_ACUITY_SCORE: 39
ADLS_ACUITY_SCORE: 38
ADLS_ACUITY_SCORE: 40
ADLS_ACUITY_SCORE: 39
ADLS_ACUITY_SCORE: 40
ADLS_ACUITY_SCORE: 39
ADLS_ACUITY_SCORE: 40
ADLS_ACUITY_SCORE: 39
ADLS_ACUITY_SCORE: 38

## 2024-08-08 NOTE — PROGRESS NOTES
Care Management Follow Up    Length of Stay (days): 9    Expected Discharge Date: 08/12/2024     Concerns to be Addressed: discharge planning (advanced care directive)     Patient plan of care discussed at interdisciplinary rounds: Yes    Anticipated Discharge Disposition: Skilled Nursing Facility, Transitional Care     Anticipated Discharge Services:    Anticipated Discharge DME: None    Patient/family educated on Medicare website which has current facility and service quality ratings: yes  Education Provided on the Discharge Plan: Yes  Patient/Family in Agreement with the Plan: yes    Referrals Placed by CM/SW: Post Acute Facilities  Private pay costs discussed: Not applicable    Additional Information:    CHW called Aranza's daughter, Joy, in order to confirm Aranza's insurance re-instatement. Joy said that she heard from her mom that everything should be fine, so CHW called DAVIDLOURDES to confirm again. HAILEE said that he insurance is currently active and CHW made sure to relay that information back to the daughter.     CHW also informed the RNCC, who will be assisting the patient in finding home care. CHW to continue to follow up if needed.      Jeanette De Santiago  5A/5C Community Health Worker  [place], [place]  [email or web address]  [phone]

## 2024-08-08 NOTE — PROGRESS NOTES
GYN ONC PROGRESS NOTE  08/08/2024    HD#11 new neurologic symptoms (confusion, dysarthria/dyskinesia)  Disease: Serous uterine carcinoma s/p XL, SNEHA, BSO, EDIE and cystotomy repair with suprapubic bladder insertion and omental flap on 7/12/24. TINY drain in place. Hx REMA-III s/p laser tx. Remote hx cervical cancer s/p radiation.    24 hour events:   - SW beginning to initiate TCU referrals while waiting for insurance reactivation  - Reconfirmed code status: Full (ordered)    SUBJECTIVE:   Patient slept soundly overnight, continues dozing at the time of pre-rounds with adequate pain control overnight. Per discussion, thinks that small amount of bleeding she noticed is not rectal in origin, more likely vaginal, expressed when straining for BM. No other new symptoms, questions, or other concerns.    OBJECTIVE:   Patient Vitals for the past 24 hrs:   BP Temp Temp src Pulse Resp SpO2   08/08/24 0020 110/59 98  F (36.7  C) Oral 72 18 100 %   08/07/24 1540 139/71 98.2  F (36.8  C) Oral 69 18 100 %   08/07/24 1407 129/64 97.7  F (36.5  C) Oral 84 18 100 %   08/07/24 0807 117/66 98.2  F (36.8  C) Oral -- 17 100 %     Constitutional: chronically ill-appearing female in no acute distress, resting in bed  Cardiovascular: Appears well perfused  Respiratory: Normal work of breathing on room air  Gastrointestinal: Abdomen non-distended. Suprapubic catheter site and former TINY drain site are C/D/I.  : Clear yellow urine present in both urinary kent and suprapubic catheter drainage bags.  Neuro: Clear speech. No dyskinetic movement.   Extremities: BLE without significant appreciable edema.     New Labs/Imaging-    Latest Reference Range & Units 08/01/24 05:54 08/02/24 06:05 08/03/24 05:13 08/04/24 05:57 08/05/24 05:29   Creatinine 0.51 - 0.95 mg/dL 1.19 (H) 1.37 (H) 1.09 (H) 0.99 (H) 0.91     Hgb 8.1>>6.6>1u pRBCs>>8.6 (8/2)      Pending cultures (date obtained):   - Blood culture (7/29): no growth (final)  - Urine culture (7/29): no  "growth (final)        ASSESSMENT:   69 year old female with serous uterine carcinoma s/p recent XL, SNEHA, BSO, EDIE with Dr. Perez c/b cystotomy on 7/12/2024 with suprapubic and urethral catheters in place. Also underwent laser treatment for REMA-III in 2021 and remote pelvic radiation for cervical cancer. Readmitted to Gynecologic Oncology service 7/21/2024 for management of SBO and UTI, discharged to TCU 7/26/2024. Now re-presented to ED for evaluation of new/progressive neurological symptoms including confusion and dysarthria over the two days prior to admission. Also new dyskinetic movements appreciated on Gynecologic Oncology team evaluation that are new since recent admissions. Per Neurology consultation, \"Overall impression at this time is that this patient is experiencing medication toxicity in the setting of multiple CNS medications,\" exacerbated by worsening renal function. Symptoms gradually improving with discontinuation of suspected anti-dopaminergic and anti-cholinergic agents; restarting indicated medications under guidance of Neurology and Psychiatry consultants. After further discussion with psychiatry on 8/2, patient and daughter ultimately elected to discontinue essentially all PTA psychiatric medications; no concerns for schizophrenia exacerbation or increased paranoia at this time. Remains inpatient while awaiting TCU placement.    # Confusion, improved  # Dysarthria, resolved  # Dyskinesia, resolved  - Appreciate Neurology, Psychiatry recommendations:  - Initially held all medications that could be contributing to dystonic reaction, including: Seroquel, Tizanidine, Robaxin, Ambien, Cogentin, oxybutynin, oxycodone, gabapentin   - Patient ultimately declined recommended re-initiation of seroquel, gabapentin, zoloft.  - Scheduled melatonin at bedtime per Neurology (recently increased given trouble sleeping), delirium precautions     # Schizophrenia  # Depression  # Insomnia  - PTA Zoloft 150 mg, " seroquel, gabapentin now discontinued per family and patient as above. Our recommendation is for reinitiation of these medications.   - Will continue to monitor patient for changes in mentation, evidence of response to internal stimuli, or paranoia. None apparent at this time.  - PTA vistaril held; Trazodone and PTA Ambien currently available PRN to support sleep but patient/family also declining these   - Patient did accept Ambien 10 mg to promote sleep overnight 8/4-8/5 with good effect     # Intermittent hypotension, improved  # Supplemental O2 requirement, now weaned  - S/p NS bolus overnight HD#1-2 (total of 1L since presentation to ED)  - Although no clear localizing chest symptoms, consider chest imaging to evaluate for pneumonia if vital sign changes recur. VSS overnight.    # Recent UTI, negative urine culture (7/29)  # Recent superficial SSI  - s/p Zosyn x1 in ED 7/29  - completed Bactrim/Flagyl course on 7/31  - blood cultures collected in ED resulted negative     # Recent intraoperative cystotomy  # Suprapubic catheter, urethral kent catheter in place  - Per discussion with Urology: TINY drain removed; remove urethral kent if inpatient 8/11, plan suprapubic catheter management outpatient in Urology clinic  - Outpatient visit with Urology initially scheduled 8/7/2024; now 8/14/2024 -- contact if still inpatient to reschedule  - Recent ~14d course of oxybutynin, held/will not resume given neurologic symptoms  - Pyridium prn inially available, held as she had not required this     # Cr elevation  # Possible underlying CKD  - Cr increased to 1.37 8/2, lasix held. Downtrended to 1.09> 0.99> 0.91  - Continue to trend serum UOP (has been adequate), Cr as indicated  - Suspect Cr elevation constitutes prerenal MADHU d/t dehydration with disorientation   - Consider to encourage PO intake  - If persistent c/f MADHU, further laboratory/imaging workup as indicated  - Consider Nephrology involvement pending clinical  course, not indicated to date     # Anemia  - Goal Hgb >7, continue to trend as indicated  - received 1u pRBCs 7/31 with appropriate Hgb rise    # Perianal skin breakdown  - WOC following     # Chronic pain  # Osteoarthritis  - Scheduled tylenol  - PTA gabapentin initially held given neurologic symptoms, despite Neurology/Psychiatry recommendation to restart, patient/family declined  - PTA prn oxycodone also initially held but now restarted; lidocaine patches prn also available     # Hypertension  - PTA lasix held as above given Cr elevation   - Patient has been normotensive     # Atrial fibrillation  - Patient currently in sinus bradycardia  - Continue PTA atenolol   - Dose reduced from 100 to 25 mg daily given persistent hypotension and bradycardia, now improved     # History of gastric bypass  - Avoid NSAIDs     # Constipation  - Scheduled Miralax     # Serous uterine carcinoma, s/p SNEHA/BSO  # History of REMA-III, s/p laser therapy  # History of cevical cancer, s/p radiation therapy  - Plan to follow-up outpatient in clinic with primary oncologist Dr. Perez for long term treatment planning  - Will need a plan for chemotherapy initiation pending discharge planning, will likely do Cycle 1 carboplatin/paclitaxel prior to discharge and then resume chemotherapy after discharge from TCU.  - Continue to monitor for any increased vaginal bleeding     # Inpatient status  - PPX: PTA abixaban (on 28d post-op course to be completed 8/9; will continue while inpatient), SCDs, IS; PPI now added for prolonged admission  - PT/OT following for deconditioning, dispo recommendations (TCU)  - SW consult placed for dispo planning; TCU placement pending/insurance status  - Pharmacy consult placed for reconciliation after presentation with polypharmacy     Anali Rodríguez MD, PGY-4  6:30 AM  [facility] Obstetrics & Gynecology Residency    Gyn Onc Pager: [phone]     Gynecologic Oncology Attending Attestation  I have seen the patient on  rounds with the team. Feeling well. Working on placement. Likely will try to give chemotherapy prior to discharge and then will resume chemotherapy after discharging from TCU.   I have discussed the patient and plan of care with the resident as documented in the note above, which I have edited as necessary.    Dany Perez MD    Gynecologic Oncology

## 2024-08-08 NOTE — PROGRESS NOTES
Care Management Follow Up    Length of Stay (days): 9    Expected Discharge Date: 2024     Concerns to be Addressed: discharge planning (advanced care directive)     Patient plan of care discussed at interdisciplinary rounds: Yes    Anticipated Discharge Disposition: Home, Home Care   Anticipated Discharge Services:  None  Anticipated Discharge DME: None    Patient/family educated on Medicare website which has current facility and service quality ratings: yes  Education Provided on the Discharge Plan: Yes  Patient/Family in Agreement with the Plan: yes    Referrals Placed by CM/SW: Post Acute Facilities  Private pay costs discussed: Not applicable    Additional Information:  RNCC was notified that patient's recs are now home with home care. Prior issue was insurance had .   KERMIT Reid was able to confirm that insurance was reactivated.   -RNCC sent home care referrals for SN (wound care) PT/OT.    NEXT: RNCC to follow-up on home care referrals. IZABELLA     Antonette Aldridge RNCC float  Nurse Coordinator    Social Work and Care Management Department   SE[facility]ABLE in [facility] - search CARE COORDINATOR     [employer] ([phone]) Saturday & ; ([phone]) [facility] Recognized Holidays     Units: 5A Onc 5201 thru 5219 RNCC, 5A Onc 5220 thru 5240 RNCC, 5C OFFSERVICE [phone] RNCC & 5C OFF SERVICE [phone] RNCC Pager: [phone]    Units: 6B Vocera, 6C Card 6401 thru 6420 RNCC, 6C Card 6502 thru 6514 RNCC, & 6C Card 6515 thru 6519 RNCC  Pager: [phone]    Units: 7A SOT RNCC Vocera, 7B Med Surg Vocera, 7C Med Surg 7401 thru 7418 RNCC & 7C Med Surg 7502 thru 7521 RNCC Pager: [phone]    Units: 6A Vocera & 4A CVICU Vocera, 4C MICU Vocera, and 4E SICU Vocera   Pager: [phone]    Units: 5 Ortho Vocera & 5 Med Surg Vocera  Pager: [phone]    Units: 6 Med Surg Vocera & 8 Med Surg Vocera  Pager [phone]

## 2024-08-08 NOTE — PROGRESS NOTES
Care Management Follow Up    Length of Stay (days): 9    Expected Discharge Date: 08/12/2024     Concerns to be Addressed: discharge planning (advanced care directive)     Patient plan of care discussed at interdisciplinary rounds: Yes    Anticipated Discharge Disposition: Home, Home Care     Anticipated Discharge Services: None  Anticipated Discharge DME: None    Patient/family educated on Medicare website which has current facility and service quality ratings: yes  Education Provided on the Discharge Plan: Yes  Patient/Family in Agreement with the Plan: yes    Referrals Placed by CM/SW: Homecare  Private pay costs discussed: Not applicable    Additional Information:  CM team following for discharge planning. Pt's health insurance is now active and extended. PT changed dispo recs to home with home care. Please see RNCC Antonette note from today for more information on discharge planning for home care.     BANDAR met with pt at bedside to discuss change in discharge plan. Pt agreeable to discharging home and reports that she feels she has a good support system at home. SW shared that CM team sent home care referrals and a team member would follow up with her tomorrow regarding status of this. Pt had no further questions for BANDAR at this time.     BANDAR received VM from Ashlyn in Admissions at [facility] (ph: [phone]) requesting call back noting that pt came from their facility and looking for an update. BANDAR attempted to call Ashlyn but no answer. BANDAR left VM updating Ashlyn on change in dispo plan and leaving call back phone number if there were questions.     ALEKS Shah   [facility]   Available via [employer]  Covering 5A BANDAR, ph: [phone]

## 2024-08-08 NOTE — PROGRESS NOTES
[facility] Nurse Inpatient Assessment     Consulted for: perianal skin breakdown, pannus skin fold incision    Summary: Patient recently discharged 7/26/2024. Perianal skin breakdown related to loose stools and IAD/MASD. Pannus skin fold with incisional dehiscence    Patient History (according to Emergency Medicine provider note(s) 7/29/2024:      Alis Hatrman is a 69 year old female with a history of atrial fibrillation on Eliquis, hypertension, schizophrenia, cervical cancer, and uterine cancer s/p diagnostic laparoscopy converted to exploratory laparotomy with hysterectomy bilateral salpingo-oophorectomy, lysis of adhesions and repair of cystotomy, insertion of suprapubic catheter (7/12/2024) who presents to the emergency department with abdominal pain and altered mental status.  She presents from TCU where she was discharged to after her most recent admission.  She was recommended to seek evaluation in the ED by the TCU physician who had concerns with her declining mental status over the past 2 days.  According to chart review of the TCU provider's note, patient's family was having concerns and suspected it was due to her Seroquel medication that was making her act differently.  They state that she is usually very upbeat and chatty and she has not been for at least the past 2 days since discharge from the hospital.  On initial exam, it is difficult to fully assess the patient due to difficulty speaking.  She is able to state her full name and birthday, where she currently is, the current month and the current year.  When asked other questions about how she has been feeling, it appears she loses her train of thought and is unable to answer.  She is also very difficult to understand due to muffled versus slurred speech versus dysarthria.  She states that she continues to have abdominal pain.  When asked if she has any other symptoms, she denies shortness  of breath or chest discomfort.  Review of systems otherwise difficult to obtain fully due to patient's condition on presentation.     According to chart review, she is taking Eliquis for history of atrial fibrillation.     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 Patient recently had XL, SNEHA , BSO, EDIE and cytotomy repair with suprapubic bladder insertion and omental flap on 7/12/2024.  TINY drain was present upon admission.  She was discharged on 7/26/2024 with new prescriptions for Robaxin 500 mg every 6 hours as needed for muscle spasms or abdominal pain/soreness, metronidazole for skin and/or soft tissue infectious s/p hysterectomy and Bactrim for skin and/or soft tissue infection s/p hysterectomy.       Assessment:      Areas visualized during today's visit: Focused: and perianal skin     Skin Injury Location: perianal      7/30/2024 coccyx         8/8  Last photo: 8/8/2024  Skin injury due to: Incontinence associated dermatitis (IAD) and Moisture associated skin damage (MASD)  Skin history and plan of care:   loose stools, moisture damage related to incontinence and moisture trapping and skin to skin contact, multiple scattered shallow areas with dermis exposed. Pigmentation is darker to the surrounding tissue, pressure component, temp is normal to touch, non tender on palpation. Patient found wearing adult brief from home, discussed indication for brief use and effects of increased temperature and moisture trapping on skin, she reported she would rather be in a hospital gown and no adult brief. Recommend to add RADU pump to isoflex support surface for additional moisture management    Affected area:      Skin assessment: Denudement and Erosion of epidermis     Measurements (length x width x depth, in cm) larger open areas: 1.5  x 1  x  0.2 cm       Color:  hyperpigmentation, darker than surrounding skin     Temperature  normal      Drainage: scant .      Color: serosanguinous      Odor: none  Pain: absent and denies , none  Pain interventions prior to dressing change: patient tolerated well, no significant pain present , and slow and gentle cares   Treatment goal: Heal , Drainage control, Infection control/prevention, Maintain (prevention of deterioration), Protection, Promote epidermal migration, and Simplify plan of care  STATUS: healing  Supplies ordered: gathered and discussed with patient     Wound location: pannus skin fold    7/30 8/8  Last photo: 8/8/2024  Wound due to: Surgical Wound  Wound history/plan of care: dehiscence of transverse abdominal incision under panus skin fold. Mild odor eminating from skin fold, discussed hygiene with patient. Sitter at bedside reports she will bathe after WOC visit. Recommend to add RADU pump to isoflex support surface for additional moisture management as well as Interdry moisture wicking textile  Wound base: 30 % non-granular tissue and adipose tissue, 70 % dermis and granulation tissue     Palpation of the wound bed: normal      Drainage: scant     Description of drainage: serosanguinous     Measurements (length x width x depth, in cm): 11  x 0.5  x  0.2 cm      Tunneling: N/A     Undermining: N/A  Periwound skin: Intact and Scar tissue      Color: normal and consistent with surrounding tissue      Temperature: normal   Odor: mild  Pain: denies , none  Pain interventions prior to dressing change: patient tolerated well, no significant pain present , and slow and gentle cares   Treatment goal: Heal , Drainage control, Infection control/prevention, Maintain (prevention of deterioration), Protection, Promote epidermal migration, and Promote maturation of scar tissue  STATUS: healing  Supplies ordered: gathered, at bedside, and discussed with patient         Treatment Plan:     Buttocks BID and as needed.   Cleanse the area with Marry cleanse and protect, very gently with soft cloth.  Apply ostomy powder (#845389) on all open and denuded skin.  Apply thin layer of Barrier paste (ex: Critic aid) on top of it.  With repeat application, do not scrub the paste, only remove soiled paste and reapply.  If complete removal of paste is necessary use baby oil/mineral oil (#718172) and soft wash cloth.  Ensure pt has chair cushion (#527184) while sitting up in the chair.  Use only one blue pad in between mattress and pt. No brief in bed.     Pannus skin fold incision: Every three days  Cleanse area with normal saline, pat dry  Apply no sting barrier wipe and allow to dry.  Cover incision with a strip of polymem that fits 1/4-1/2 inch larger than the incision width.  Place polymem strip with printing facing outward (read directions on dressing). Solid pink foma should make contact with the wound bed.   Secure with medipore tape only.  If needing additional moisture management, apply Interdry within skin folds and follow additional guidelines below for use.    Interdry(order#[id number]):   1.  Wash skin gently. Pat, do not rub.  2.  With clean scissors, cut enough fabric to cover the affected area, allowing for a minimum of 2 inches to extend beyond the skin fold for moisture evaporation.  3.  Lay a single layer of fabric in the skin fold, placing one edge into the base of the fold. Gently smooth the rest of the fabric over the skin, keeping it flat.  4.  Leave at least 2 inches of fabric exposed outside the skin fold.  5.  Secure the fabric in one of several ways: with the skin fold, with a small amount of skin-friendly tape, or tucked under clothing.  6.  Remove the fabric before bathing and reuse it when finished. When removing, gently separate the skin fold and lift away.  Helpful Hints  1. InterDry  can be written on with a ballpoint pen. It may be helpful to label each piece of  "InterDry  with the date you started using it.  2.  Each piece of InterDry  may be used up to 5 days, depending on fabric soiling, odor, amount of moisture and general skin condition. Replace InterDry  if it becomes soiled with blood, urine or stool.  3.  Do not use creams, ointments, or powders with InterDry  as it may interfere with product efficacy.     Pressure Injury Prevention (PIP) Plan:  If patient is declining pressure injury prevention interventions: Explore reason why and address patient's concerns, Educate on pressure injury risk and prevention intervention(s), If patient is still declining, document \"informed refusal\" , and Ensure Care team is aware ( provider, charge nurse, etc)  Mattress: Follow bed algorithm, reassess daily and order specialty mattress, if indicated.  HOB: Maintain at or below 30 degrees, unless contraindicated  Repositioning in bed: Every 1-2 hours , Left/right positioning; avoid supine, Raise foot of bed prior to raising head of bed, to reduce patient sliding down (shear), and Frequent microturns using positioning wedges, as patient tolerates  Heels: Keep elevated off mattress and Pillows under calves  Protective Dressing: Sacral Mepilex for prevention (#068560),  especially for the agitated patient   Positioning Equipment:None  Chair positioning: Chair cushion (#887451) , Assist patient to reposition hourly, and Do NOT use a donut for sitting (this increases pressure to smaller area and creates a higher potential for injury)    If patient has a buttock pressure injury, or high risk for PI use chair cushion or SPS.  Moisture Management: Perineal cleansing /protection: Follow Incontinence Protocol, Avoid brief in bed, Clean and dry skin folds with bathing , Consider InterDry (#121602) between folds, and Moisturize dry skin  Under Devices: Inspect skin under all medical devices during skin inspection , Ensure tubes are stabilized without tension, and Ensure patient is not lying on " medical devices or equipment when repositioned  Ask provider to discontinue device when no longer needed.         Orders: Written    RECOMMEND PRIMARY TEAM ORDER: None, at this time  Education provided: importance of repositioning, plan of care, wound progress, Infection prevention , Moisture management, Hygiene, and Off-loading pressure  Discussed plan of care with: Patient  WO nurse follow-up plan: weekly  Notify WOC if wound(s) deteriorate.  Nursing to notify the Provider(s) and re-consult the WOC Nurse if new skin concern.    DATA:     Current support surface: Standard  Stretcher  Containment of urine/stool: Incontinence Protocol, Incontinent pad in bed, and Suprapubic catheter  BMI: Body mass index is 29.3 kg/m .   Active diet order: Orders Placed This Encounter      Combination Diet Regular Diet Adult     Output: I/O last 3 completed shifts:  In: 225 [P.O.:225]  Out: 1850 [Urine:1850]     Labs:   Recent Labs   Lab 08/02/24  0605   HGB 8.6*   WBC 7.1     Pressure injury risk assessment:   Sensory Perception: 3-->slightly limited  Moisture: 4-->rarely moist  Activity: 3-->walks occasionally  Mobility: 3-->slightly limited  Nutrition: 2-->probably inadequate  Friction and Shear: 2-->potential problem  Yogi Score: 17      Pager no longer is use, please contact through [employer] group: [facility] Nurse [place]  Dept. Office Number: [phone]

## 2024-08-08 NOTE — PLAN OF CARE
Goal Outcome Evaluation:      Plan of Care Reviewed With: patient    Overall Patient Progress: improving    Alert, oriented, up with SBA/W/GB.  AVSS.  Tolerating regular diet, denies nausea.  Oxycodone given for abdominal pain.  LBM yesterday.    Adequate UOP via kent and SP cath.   PLAN: Awaiting TCU placement.

## 2024-08-08 NOTE — PROGRESS NOTES
Brief Gyn Onc Progress Note    In light of anticipated discharge home with Home Health in the coming days for patient now nearly one month out from her surgery for serous uterine carcinoma, Gyn Onc care team began working to coordinate first chemotherapy infusion prior to discharge.     Per sign out from Gyn Onc day team, they reviewed the recommended carboplatin and paclitaxel infusions with the patient in person and with her daughter on speaker phone in the patient's room earlier today (patient education materials are present at bedside). Chemotherapy orders were signed by Gyn Onc staff midday, released by bedside nursing team shortly ago, and Pharmacy indicated that they could prepare the infusions to be given this evening, if needed.    When confirming the above plan with the patient's bedside RN over the phone, they shared that the patient had expressed some hesitation and uncertainty regarding her comfort receiving the chemotherapy infusions this evening. Gyn Onc G1 and G4 presented to bedside to seek clarification and address any patient questions/concerns.    At that time, Aranza stated that she understands that chemotherapy is the next step in her cancer treatment, and she knows that she will need to start it at some point soon. She is worried that it will be painful, and we talked about potential momentary discomfort associated with obtaining appropriate vascular access, but that the infusion itself isn't usually painful for patients. We also reviewed potential sources of discomfort in the days after chemotherapy, including nausea and fatigue. Aranza was also seeking clarification about what a port was and whether she'll need one; described it to her as a sort of long-term IV and clarified that whether or not she would need one placed would depend on how this first cycle goes and her long-term planned chemotherapy regimen. Informed her that she and her primary Gynecologic Oncologist Dr. Perez will continue  to discuss these and other details at follow-up visits in the clinic.    Ultimately, when presented with the potential plans of beginning the chemotherapy infusions this evening, or receiving them tomorrow morning, the patient states that she would feel much more comfortable holding off until tomorrow. She would like a little more time to go over the information discussed today, and talk through things with her daughter Joy. Gyn Onc G1 and G4 offered to call Joy to provide these updates this evening, but Aranza said that she would rather call her daughter herself jose. Encouraged Aranza to please let us know if Joy has any questions for us, and we would be happy to give her a call at any time.    Above updates conveyed to 5A charge RN, who contacted Pharmacy; Pharmacy indicated that they will re-time chemo infusions for tomorrow morning 8/9. Bedside RN and on-call Gyn Onc fellow Dr. Shin also updated.    Anali Rodríguez MD, PGY-4  7:19 PM  [facility] Obstetrics [facility] Gynecology Residency

## 2024-08-08 NOTE — PROGRESS NOTES
Brief Gyn Onc Progress Note    At bedside for evening check-in with patient. She reports more bothersome pain across her lower belly, in the area of her suprapubic catheter; just received oxycodone for this. On exam, site is C/D/I, without any drainage, erythema, or fluctuance. The area is also non-tender to palpation. Encouraged patient to continue requesting medications for discomfort as needed.    Patient also shared that she has noticed some bleeding when she was using the bathroom. Attributes this to straining for a BM, so she took some Sennakot-S this evening. Not sure exactly where blood is coming from. Reviewed that small amounts of vaginal bleeding can be normal after recent hysterectomy. Called bedside RN, who has not noted any such bleeding this shift, but will inform the primary team if it recurs.    Please contact the primary team via the Gyn Onc Pager: [phone] with any questions or concerns.    Anali Rodríguez MD, PGY-4  11:44 PM  [facility] Obstetrics & Gynecology Residency

## 2024-08-09 ENCOUNTER — APPOINTMENT (OUTPATIENT)
Dept: PHYSICAL THERAPY | Facility: CLINIC | Age: 69
DRG: 091 | End: 2024-08-09
Payer: COMMERCIAL

## 2024-08-09 VITALS
DIASTOLIC BLOOD PRESSURE: 78 MMHG | HEART RATE: 63 BPM | WEIGHT: 149.2 LBS | SYSTOLIC BLOOD PRESSURE: 141 MMHG | BODY MASS INDEX: 28.54 KG/M2 | TEMPERATURE: 97.5 F | RESPIRATION RATE: 17 BRPM | OXYGEN SATURATION: 100 %

## 2024-08-09 PROCEDURE — 250N000013 HC RX MED GY IP 250 OP 250 PS 637: Performed by: OBSTETRICS & GYNECOLOGY

## 2024-08-09 PROCEDURE — 250N000013 HC RX MED GY IP 250 OP 250 PS 637

## 2024-08-09 PROCEDURE — 999N000128 HC STATISTIC PERIPHERAL IV START W/O US GUIDANCE

## 2024-08-09 PROCEDURE — 999N000206 HC STATISTICAL VASC ACCESS NURSE TIME, 61+ MINUTES

## 2024-08-09 PROCEDURE — 3E04305 INTRODUCTION OF OTHER ANTINEOPLASTIC INTO CENTRAL VEIN, PERCUTANEOUS APPROACH: ICD-10-PCS | Performed by: OBSTETRICS & GYNECOLOGY

## 2024-08-09 PROCEDURE — 97530 THERAPEUTIC ACTIVITIES: CPT | Mod: GP | Performed by: PHYSICAL THERAPIST

## 2024-08-09 PROCEDURE — 99238 HOSP IP/OBS DSCHRG MGMT 30/<: CPT | Mod: 24 | Performed by: OBSTETRICS & GYNECOLOGY

## 2024-08-09 PROCEDURE — 97110 THERAPEUTIC EXERCISES: CPT | Mod: GP | Performed by: PHYSICAL THERAPIST

## 2024-08-09 PROCEDURE — 250N000011 HC RX IP 250 OP 636: Performed by: OBSTETRICS & GYNECOLOGY

## 2024-08-09 PROCEDURE — 258N000003 HC RX IP 258 OP 636: Performed by: OBSTETRICS & GYNECOLOGY

## 2024-08-09 RX ORDER — POLYETHYLENE GLYCOL 3350 17 G/17G
17 POWDER, FOR SOLUTION ORAL DAILY PRN
Qty: 510 G | Refills: 0 | Status: SHIPPED | OUTPATIENT
Start: 2024-08-09

## 2024-08-09 RX ORDER — ATENOLOL 25 MG/1
25 TABLET ORAL DAILY
Qty: 30 TABLET | Refills: 0 | Status: ON HOLD | OUTPATIENT
Start: 2024-08-10 | End: 2024-09-27

## 2024-08-09 RX ORDER — AMOXICILLIN 250 MG
1 CAPSULE ORAL 2 TIMES DAILY
Qty: 60 TABLET | Refills: 0 | Status: SHIPPED | OUTPATIENT
Start: 2024-08-09

## 2024-08-09 RX ORDER — DEXAMETHASONE 4 MG/1
8 TABLET ORAL
Qty: 6 TABLET | Refills: 0 | Status: SHIPPED | OUTPATIENT
Start: 2024-08-10 | End: 2024-09-09

## 2024-08-09 RX ORDER — PROCHLORPERAZINE MALEATE 5 MG
5-10 TABLET ORAL EVERY 6 HOURS PRN
Qty: 40 TABLET | Refills: 0 | Status: SHIPPED | OUTPATIENT
Start: 2024-08-09

## 2024-08-09 RX ORDER — ONDANSETRON 8 MG/1
8 TABLET, FILM COATED ORAL EVERY 8 HOURS PRN
Qty: 20 TABLET | Refills: 0 | Status: SHIPPED | OUTPATIENT
Start: 2024-08-09

## 2024-08-09 RX ORDER — OXYCODONE HYDROCHLORIDE 5 MG/1
5 TABLET ORAL EVERY 4 HOURS PRN
Qty: 18 TABLET | Refills: 0 | Status: SHIPPED | OUTPATIENT
Start: 2024-08-09 | End: 2024-08-12

## 2024-08-09 RX ADMIN — FAMOTIDINE 20 MG: 10 INJECTION, SOLUTION INTRAVENOUS at 08:36

## 2024-08-09 RX ADMIN — PANTOPRAZOLE SODIUM 40 MG: 40 TABLET, DELAYED RELEASE ORAL at 08:36

## 2024-08-09 RX ADMIN — SENNOSIDES AND DOCUSATE SODIUM 1 TABLET: 8.6; 5 TABLET ORAL at 08:36

## 2024-08-09 RX ADMIN — DIPHENHYDRAMINE HYDROCHLORIDE 50 MG: 25 CAPSULE ORAL at 08:36

## 2024-08-09 RX ADMIN — APIXABAN 2.5 MG: 2.5 TABLET, FILM COATED ORAL at 08:36

## 2024-08-09 RX ADMIN — CARBOPLATIN 390 MG: 10 INJECTION, SOLUTION INTRAVENOUS at 13:34

## 2024-08-09 RX ADMIN — OXYCODONE HYDROCHLORIDE 5 MG: 5 TABLET ORAL at 16:18

## 2024-08-09 RX ADMIN — PACLITAXEL 300 MG: 6 INJECTION, SOLUTION INTRAVENOUS at 09:38

## 2024-08-09 RX ADMIN — POLYETHYLENE GLYCOL 3350 17 G: 17 POWDER, FOR SOLUTION ORAL at 08:36

## 2024-08-09 RX ADMIN — FOSAPREPITANT: 150 INJECTION, POWDER, LYOPHILIZED, FOR SOLUTION INTRAVENOUS at 08:55

## 2024-08-09 RX ADMIN — OXYCODONE HYDROCHLORIDE 5 MG: 5 TABLET ORAL at 06:04

## 2024-08-09 RX ADMIN — ATENOLOL 25 MG: 25 TABLET ORAL at 08:36

## 2024-08-09 RX ADMIN — ONDANSETRON HYDROCHLORIDE 16 MG: 8 TABLET, FILM COATED ORAL at 08:36

## 2024-08-09 ASSESSMENT — ACTIVITIES OF DAILY LIVING (ADL)
ADLS_ACUITY_SCORE: 38

## 2024-08-09 NOTE — PROGRESS NOTES
"Care Management Discharge Note    Discharge Date: 08/09/2024       Discharge Disposition: Home    Discharge Services: Home Care (RN, PT, OT)    Discharge DME: None    Discharge Transportation: friend/family to provide    Private pay costs discussed: Not applicable    Does the patient's insurance plan have a 3 day qualifying hospital stay waiver?  No    PAS Confirmation Code:  n/a  Patient/family educated on Medicare website which has current facility and service quality ratings: yes    Education Provided on the Discharge Plan: Yes  Persons Notified of Discharge Plans: [employer] and patient's daughter Joy.  Patient/Family in Agreement with the Plan: yes    Handoff Referral Completed: Yes    Additional Information:  Per oncology note today:   \"69 year old female with serous uterine carcinoma s/p recent XL, SNEHA, BSO, EDIE with Dr. Perez c/b cystotomy on 7/12/2024 with suprapubic and urethral catheters in place. Also underwent laser treatment for REMA-III in 2021 and remote pelvic radiation for cervical cancer. Readmitted to Gynecologic Oncology service 7/21/2024 for management of SBO and UTI, discharged to TCU 7/26/2024. Now re-presented to ED for evaluation of new/progressive neurological symptoms including confusion and dysarthria over the two days prior to admission. Also new dyskinetic movements appreciated on Gynecologic Oncology team evaluation that are new since recent admissions. Per Neurology consultation, \"Overall impression at this time is that this patient is experiencing medication toxicity in the setting of multiple CNS medications,\" exacerbated by worsening renal function. Symptoms gradually improving with discontinuation of suspected anti-dopaminergic and anti-cholinergic agents; restarting indicated medications under guidance of Neurology and Psychiatry consultants. After further discussion with psychiatry on 8/2, patient and daughter ultimately elected to discontinue essentially all PTA " "psychiatric medications; no concerns for schizophrenia exacerbation or increased paranoia at this time. Remains inpatient while awaiting coordination of Home Health services. Plan first chemotherapy infusion prior to discharge.\"    Patient accepted for home nursing, PT, and OT with [facility].  Writer called them and updated them that patient is discharging this afternoon.  Per gyn/onc provider, home care start of care should be this weekend, and should not wait til Monday.  Writer communicated this to [facility].  They stated they are starting care tomorrow.        [employer].      Address   [address] [place] [id number]             Contact Information    [phone]              Veena Read, RN, BSN  RN Care Coordinator    5A Medical Oncology/5C non-BMT  5A beds [phone]  5C beds [phone] (non-BMT)  [address] [address]  [email or web address]  Gender pronouns: she/her  Units: 5A Onc Vocera & 5C Vocera     "

## 2024-08-09 NOTE — PROGRESS NOTES
GYN ONC PROGRESS NOTE  08/09/2024    HD#12 new neurologic symptoms (confusion, dysarthria/dyskinesia)  Disease: Serous uterine carcinoma s/p XL, SNEHA, BSO, EDIE and cystotomy repair with suprapubic bladder insertion and omental flap on 7/12/24. TINY drain in place. Hx REMA-III s/p laser tx. Remote hx cervical cancer s/p radiation.    24 hour events:   - Insurance reinstated  - PT: home with home PT   - Plan to start chemo prior to discharge, ordered/released/verified for 0900 8/9    SUBJECTIVE:   Patient slept soundly overnight, continues dozing at the time of pre-rounds with adequate pain control overnight. Per discussion with RN outside of room, patient did not share any further questions/concerns about starting chemo this morning.    OBJECTIVE:   Patient Vitals for the past 24 hrs:   BP Temp Temp src Pulse Resp SpO2 Weight   08/09/24 0059 123/71 97.4  F (36.3  C) Axillary 66 17 100 % --   08/08/24 1605 115/63 98.2  F (36.8  C) Oral 63 17 100 % --   08/08/24 1147 126/73 97.9  F (36.6  C) -- 67 17 100 % 69.8 kg (153 lb 12.8 oz)   08/08/24 0805 129/70 97.9  F (36.6  C) -- 64 17 98 % --     Constitutional: chronically ill-appearing female in no acute distress, resting in bed  Cardiovascular: Appears well perfused  Respiratory: Normal work of breathing on room air  Gastrointestinal: Abdomen non-distended. Suprapubic catheter site and former TINY drain site are C/D/I.  : Clear yellow urine present in both urinary kent and suprapubic catheter drainage bags.  Neuro: Clear speech. No dyskinetic movement.   Extremities: BLE without significant appreciable edema.     New Labs/Imaging-   Recent Results (from the past 24 hour(s))   Comprehensive metabolic panel    Collection Time: 08/08/24 12:11 PM   Result Value Ref Range    Sodium 136 135 - 145 mmol/L    Potassium 4.0 3.4 - 5.3 mmol/L    Carbon Dioxide (CO2) 20 (L) 22 - 29 mmol/L    Anion Gap 8 7 - 15 mmol/L    Urea Nitrogen 8.9 8.0 - 23.0 mg/dL    Creatinine 0.86 0.51 - 0.95  mg/dL    GFR Estimate 72 >60 mL/min/1.73m2    Calcium 8.7 (L) 8.8 - 10.4 mg/dL    Chloride 108 (H) 98 - 107 mmol/L    Glucose 96 70 - 99 mg/dL    Alkaline Phosphatase 75 40 - 150 U/L    AST 18 0 - 45 U/L    ALT <5 0 - 50 U/L    Protein Total 6.3 (L) 6.4 - 8.3 g/dL    Albumin 2.7 (L) 3.5 - 5.2 g/dL    Bilirubin Total 0.5 <=1.2 mg/dL   Magnesium    Collection Time: 08/08/24 12:11 PM   Result Value Ref Range    Magnesium 1.8 1.7 - 2.3 mg/dL   CBC with platelets and differential    Collection Time: 08/08/24 12:11 PM   Result Value Ref Range    WBC Count 6.6 4.0 - 11.0 10e3/uL    RBC Count 4.17 3.80 - 5.20 10e6/uL    Hemoglobin 10.5 (L) 11.7 - 15.7 g/dL    Hematocrit 36.2 35.0 - 47.0 %    MCV 87 78 - 100 fL    MCH 25.2 (L) 26.5 - 33.0 pg    MCHC 29.0 (L) 31.5 - 36.5 g/dL    RDW 27.6 (H) 10.0 - 15.0 %    Platelet Count 233 150 - 450 10e3/uL    % Neutrophils 59 %    % Lymphocytes 29 %    % Monocytes 9 %    % Eosinophils 1 %    % Basophils 1 %    % Immature Granulocytes 1 %    NRBCs per 100 WBC 0 <1 /100    Absolute Neutrophils 4.0 1.6 - 8.3 10e3/uL    Absolute Lymphocytes 1.9 0.8 - 5.3 10e3/uL    Absolute Monocytes 0.6 0.0 - 1.3 10e3/uL    Absolute Eosinophils 0.1 0.0 - 0.7 10e3/uL    Absolute Basophils 0.1 0.0 - 0.2 10e3/uL    Absolute Immature Granulocytes 0.0 <=0.4 10e3/uL    Absolute NRBCs 0.0 10e3/uL   Comprehensive metabolic panel    Collection Time: 08/08/24  5:50 PM   Result Value Ref Range    Sodium 137 135 - 145 mmol/L    Potassium 4.1 3.4 - 5.3 mmol/L    Carbon Dioxide (CO2) 22 22 - 29 mmol/L    Anion Gap 6 (L) 7 - 15 mmol/L    Urea Nitrogen 8.2 8.0 - 23.0 mg/dL    Creatinine 0.83 0.51 - 0.95 mg/dL    GFR Estimate 75 >60 mL/min/1.73m2    Calcium 8.4 (L) 8.8 - 10.4 mg/dL    Chloride 109 (H) 98 - 107 mmol/L    Glucose 102 (H) 70 - 99 mg/dL    Alkaline Phosphatase 68 40 - 150 U/L    AST 17 0 - 45 U/L    ALT 8 0 - 50 U/L    Protein Total 5.6 (L) 6.4 - 8.3 g/dL    Albumin 2.6 (L) 3.5 - 5.2 g/dL    Bilirubin Total 0.3  "<=1.2 mg/dL   Magnesium    Collection Time: 08/08/24  5:50 PM   Result Value Ref Range    Magnesium 1.7 1.7 - 2.3 mg/dL   CBC with platelets and differential    Collection Time: 08/08/24  5:50 PM   Result Value Ref Range    WBC Count 5.8 4.0 - 11.0 10e3/uL    RBC Count 3.41 (L) 3.80 - 5.20 10e6/uL    Hemoglobin 8.7 (L) 11.7 - 15.7 g/dL    Hematocrit 28.2 (L) 35.0 - 47.0 %    MCV 83 78 - 100 fL    MCH 25.5 (L) 26.5 - 33.0 pg    MCHC 30.9 (L) 31.5 - 36.5 g/dL    RDW 27.2 (H) 10.0 - 15.0 %    Platelet Count 208 150 - 450 10e3/uL    % Neutrophils 57 %    % Lymphocytes 30 %    % Monocytes 10 %    % Eosinophils 1 %    % Basophils 1 %    % Immature Granulocytes 1 %    NRBCs per 100 WBC 0 <1 /100    Absolute Neutrophils 3.3 1.6 - 8.3 10e3/uL    Absolute Lymphocytes 1.8 0.8 - 5.3 10e3/uL    Absolute Monocytes 0.6 0.0 - 1.3 10e3/uL    Absolute Eosinophils 0.1 0.0 - 0.7 10e3/uL    Absolute Basophils 0.0 0.0 - 0.2 10e3/uL    Absolute Immature Granulocytes 0.0 <=0.4 10e3/uL    Absolute NRBCs 0.0 10e3/uL       Pending cultures (date obtained):   - Blood culture (7/29): no growth (final)  - Urine culture (7/29): no growth (final)        ASSESSMENT:   69 year old female with serous uterine carcinoma s/p recent XL, SNEHA, BSO, EDIE with Dr. Perez c/b cystotomy on 7/12/2024 with suprapubic and urethral catheters in place. Also underwent laser treatment for REMA-III in 2021 and remote pelvic radiation for cervical cancer. Readmitted to Gynecologic Oncology service 7/21/2024 for management of SBO and UTI, discharged to TCU 7/26/2024. Now re-presented to ED for evaluation of new/progressive neurological symptoms including confusion and dysarthria over the two days prior to admission. Also new dyskinetic movements appreciated on Gynecologic Oncology team evaluation that are new since recent admissions. Per Neurology consultation, \"Overall impression at this time is that this patient is experiencing medication toxicity in the setting of " "multiple CNS medications,\" exacerbated by worsening renal function. Symptoms gradually improving with discontinuation of suspected anti-dopaminergic and anti-cholinergic agents; restarting indicated medications under guidance of Neurology and Psychiatry consultants. After further discussion with psychiatry on 8/2, patient and daughter ultimately elected to discontinue essentially all PTA psychiatric medications; no concerns for schizophrenia exacerbation or increased paranoia at this time. Remains inpatient while awaiting coordination of Home Health services. Plan first chemotherapy infusion prior to discharge.    # Confusion, improved  # Dysarthria, resolved  # Dyskinesia, resolved  - Appreciate Neurology, Psychiatry recommendations:  - Initially held all medications that could be contributing to dystonic reaction, including: Seroquel, Tizanidine, Robaxin, Ambien, Cogentin, oxybutynin, oxycodone, gabapentin   - Patient ultimately declined recommended re-initiation of seroquel, gabapentin, zoloft.  - Scheduled melatonin at bedtime per Neurology (recently increased given trouble sleeping), delirium precautions     # Schizophrenia  # Depression  # Insomnia  - PTA Zoloft 150 mg, seroquel, gabapentin now discontinued per family and patient as above. Our recommendation is for reinitiation of these medications.   - Will continue to monitor patient for changes in mentation, evidence of response to internal stimuli, or paranoia. None apparent at this time.  - PTA vistaril held; Trazodone and PTA Ambien currently available PRN to support sleep but patient/family also declining these   - Patient did accept Ambien 10 mg to promote sleep overnight 8/4-8/5 with good effect     # Intermittent hypotension, improved  # Supplemental O2 requirement, now weaned  - S/p NS bolus overnight HD#1-2 (total of 1L since presentation to ED)  - Although no clear localizing chest symptoms, consider chest imaging to evaluate for pneumonia if " vital sign changes recur. VSS overnight.    # Recent UTI, negative urine culture (7/29)  # Recent superficial SSI  - s/p Zosyn x1 in ED 7/29  - completed Bactrim/Flagyl course on 7/31  - blood cultures collected in ED resulted negative     # Recent intraoperative cystotomy  # Suprapubic catheter, urethral kent catheter in place  - Per discussion with Urology: TINY drain removed; remove urethral kent if inpatient 8/11   - Will confirm with Urology whether patient requires cystogram prior to this  - Plan suprapubic catheter management outpatient in Urology clinic  - Outpatient visit with Urology initially scheduled 8/7/2024; now 8/14/2024 -- contact if still inpatient to reschedule  - Recent ~14d course of oxybutynin, held/will not resume given neurologic symptoms  - Pyridium prn inially available, held as she had not required this     # Cr elevation  # Possible underlying CKD  - Cr increased to 1.37 8/2, lasix held. Downtrended to 1.09> 0.99> 0.91> 0.83  - Continue to trend serum UOP (has been adequate), Cr as indicated  - Suspect Cr elevation constitutes prerenal MADHU d/t dehydration with disorientation   - Consider to encourage PO intake  - If persistent c/f MADHU, further laboratory/imaging workup as indicated  - Consider Nephrology involvement pending clinical course, not indicated to date     # Anemia  - Goal Hgb >7, continue to trend as indicated  - received 1u pRBCs 7/31 with appropriate Hgb rise    # Perianal skin breakdown  - WOC following     # Chronic pain  # Osteoarthritis  - Scheduled tylenol  - PTA gabapentin initially held given neurologic symptoms, despite Neurology/Psychiatry recommendation to restart, patient/family declined  - PTA prn oxycodone also initially held but now restarted; lidocaine patches prn also available     # Hypertension  - PTA lasix held as above given Cr elevation   - Patient has been normotensive     # Atrial fibrillation  - Patient currently in sinus bradycardia  - Continue PTA  atenolol   - Dose reduced from 100 to 25 mg daily given persistent hypotension and bradycardia, now improved     # History of gastric bypass  - Avoid NSAIDs     # Constipation  - Scheduled Miralax     # Serous uterine carcinoma, s/p SNEHA/BSO  # History of REMA-III, s/p laser therapy  # History of cevical cancer, s/p radiation therapy  - Plan to follow-up outpatient in clinic with primary oncologist Dr. Perez for long term treatment planning  - Will need a plan for chemotherapy initiation pending discharge planning, plan Cycle 1 carboplatin/paclitaxel today 8/9  - Continue to monitor for any increased vaginal bleeding     # Inpatient status  - PPX: PTA abixaban (on 28d post-op course to be completed 8/9; will continue while inpatient), SCDs, IS; PPI now added for prolonged admission  - PT/OT following for deconditioning, dispo recommendations (previously TCU, now home with Home Health)  - SW consult placed for dispo planning; Home Health referrals completed.  - Pharmacy consult placed for reconciliation after presentation with polypharmacy     Anali Rodríguez MD, PGY-4  7:00 AM  [facility] Obstetrics & Gynecology Residency    Gyn Onc Pager: [phone]    Gynecologic Oncology Attending Attestation  I have seen the patient on rounds with the team. Feeling well. Discussed chemotherapy and ok to proceed. Discharge planned for home with home care. Will need next cycle scheduled, message sent to nursing pool.   I have discussed the patient and plan of care with the resident as documented in the note above, which I have edited as necessary.    Dany Perez MD    Gynecologic Oncology

## 2024-08-09 NOTE — PROGRESS NOTES
[id number] Aox4. VSS. On RA. Abdominal pain managed with prn oxycodone x2. Denies nausea. Lower abdominal wound cares completed per POC. Suprapubic and kent catheter with urine, AUOP. BM x2 today. PIV Sl'd. Tolerating diet. Up with A1, GB and a walker. Plan is to start chemotherapy tomorrow. Will discharge home with [facility].

## 2024-08-09 NOTE — CARE PLAN
Pt discharged from unit 5A at 1839 to home with family. All discharge instructions were given to the patient, medication orders were sent to discontinue pharmacy, and all belongings remained with the patient.     Wound care was completed  Shahid was removed  PIV was removed after chemo completed

## 2024-08-09 NOTE — DISCHARGE INSTRUCTIONS
You have been accepted for home nursing, home physical therapy, and home occupational therapy with:     [facility]      Address   [address] [facility] [address]             Contact Information    [phone]

## 2024-08-09 NOTE — PLAN OF CARE
Goal Outcome Evaluation:    2299-2113  VSS on RA. Pain managed by scheduled meds and PRN oxycodone x2. Denies n/v, SOB. Abd surgical incision dressing CDI. PIV saline locked. Suprapubic and kent catheters with AUOP. A1 when up. Regular diet. Plan to discharge home with [facility] after chemotherapy 8/9. Continue with POC.

## 2024-08-09 NOTE — PROGRESS NOTES
Brief Gyn Onc Progress Note    At patient bedside to evening check-in to see if she had any further questions or additional concerns after our conversation regarding chemotherapy initiation earlier in the evening. Patient sleeping soundly, thus not disturbed.    Please contact the primary team via the Gyn Onc Pager: [phone] with any questions or concerns.     Anali Rodríguez MD, PGY-4  10:09 PM  [facility] Obstetrics & Gynecology Residency

## 2024-08-10 ENCOUNTER — NURSE TRIAGE (OUTPATIENT)
Dept: NURSING | Facility: CLINIC | Age: 69
End: 2024-08-10
Payer: COMMERCIAL

## 2024-08-10 NOTE — TELEPHONE ENCOUNTER
Patient calling for refill of pain medication. Notes that she has a 3 day prescription at the pharmacy but would like to have the whole prescription filled instead.  Writer advised patient to call clinic/specialist on Monday when office is open to have medication filled.  No triage.     Reason for Disposition   Caller requesting a CONTROLLED substance prescription refill (e.g., narcotics, ADHD medicines)    Protocols used: Medication Refill and Renewal Call-A-

## 2024-08-12 DIAGNOSIS — Z90.710 S/P HYSTERECTOMY: ICD-10-CM

## 2024-08-12 RX ORDER — OXYCODONE HYDROCHLORIDE 5 MG/1
5 TABLET ORAL EVERY 6 HOURS PRN
Qty: 30 TABLET | Refills: 0 | Status: SHIPPED | OUTPATIENT
Start: 2024-08-12

## 2024-08-12 NOTE — TELEPHONE ENCOUNTER
Narcotic Refill Request  Medication(s) requested:  Oxycodone 5mg tab  Person Requesting Refill: indra Hamilton  What pain is the medication treating: cancer pain  How is the medication being taken?: 1 tab q4h   Does pt have enough for today? Will be out by end of day  Is pain being adequately controlled on the current regimen?: yes  Experiencing any side effects from medication?: none    Date of most recent appointment:  5/28/24  w/ Dr. Perez; hospital from 7/29-8/9/24  Any No Show Visits: none  Next appointment:   none scheduled  Last fill date and by whom:  Dr. Anatoly Avina on 8/9/24 for 3 day supply    Reviewed: no access    Send to provider: Mar Escobar

## 2024-08-12 NOTE — TELEPHONE ENCOUNTER
Gyn Onc Attending Note   Patient calling with request for refill of pain medication. Patient has received recent pain medication refills from our service due to recent surgery followed by hospitalizations, however, she was previously on chronic opioid therapy prior to cancer surgery. Back to baseline pain medication needs. Review of PDMP suggests that chronic opioids were prescribed by her PCP, Maru Bower. I will refill a few more days worth so she doesn't run out before reaching out to their office, but she needs to resume prescribing of her chronic opioid therapy with her primary provider.     Dany Perez MD

## 2024-08-12 NOTE — TELEPHONE ENCOUNTER
Pt daughter Joy calling to check on status of refill request. Informed Joy refill request has been pended and routed to team for review.

## 2024-08-12 NOTE — PROGRESS NOTES
Physical Therapy Discharge Summary    Reason for therapy discharge:    Discharged to home with home therapy.    Progress towards therapy goal(s). See goals on Care Plan in [facility] electronic health record for goal details.  Goals not met.  Barriers to achieving goals:   discharge from facility.    Therapy recommendation(s):    Continued therapy is recommended.  Rationale/Recommendations:  Pt is safe to discharge to home with assist from daughter, home PT/OT.. Home PT/OT for home safety evaluation and continue to progress activity tolerance and strength.

## 2024-08-15 ENCOUNTER — PATIENT OUTREACH (OUTPATIENT)
Dept: ONCOLOGY | Facility: CLINIC | Age: 69
End: 2024-08-15
Payer: COMMERCIAL

## 2024-08-15 NOTE — PROGRESS NOTES
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  Discharge Follow up: Urology as scheduled. Gyn Onc NP 1-2 weeks. PCP and Psychiatry. Cycle #2 chemo.     Discharge Disposition:  Home with family and home health care OT/PT/RN    Notes:   Attempted reaching patient via telephone x 2 this afternoon for post-hospital follow-up.  No answer, and voicemail box was full so unable to leave a message.  Confirmed patient has future appointments scheduled as below.    Future Appointments   Date Time Provider Department Center   8/20/2024  9:30 AM Robin Herrera MD [facility]   8/22/2024  2:00 PM Marcia Painting APRN [facility]   8/26/2024 10:00 AM [id number]   8/27/2024  2:30 PM Do Mendiola APRN [facility]   8/29/2024 10:30 AM [facility]ST [facility]   8/29/2024 11:00 AM [facility] INFUSION [facility]     Outpatient Plan as outlined on AVS reviewed with patient.    For any urgent concerns, please contact our 24 hour clinic  line:   [place]: [phone]     Nicholas Jarrell, RN, BSN, OCN  RN Care Coordinator - Oncology  [facility]

## 2024-08-19 NOTE — PROGRESS NOTES
"Gynecologic Oncology Follow Up Note    RE: Alis Hartman   : 1955  PRONOUNS: she/her/hers  SHERMAN: 2024  GYNECOLOGIC ONCOLOGIST: Dany Perez MD    CC: Alis Hartman is a 69 year old female with uterine carcinosarcoma presenting for post-hospital follow up    INTERVAL HISTORY:  Aranza was admitted to [facility] -24 with acute mental status changes, concern for encephalopathy due to polypharmacy. Her medication regimen was adjust while inpatient. A Shahid was removed and she was discharged with a suprapubic catheter draining into a leg bag. She was given C1D1 carboplatin/paclitaxel while inpatient 24.    Since her discharge, she feels \"like I have good days and bad days.\" She is not feeling ill from chemotherapy, but is having pain- see below.    Continues to have discomfort at the suprapubic catheter site- feels it is improving though over time though, denies infectious symptoms. Draining into a leg bag. Forgot her urology appointments and needs to follow up.    Had one spot of vaginal bleeding, felt it occurred after straining and has not returned.    Chronic back pain- follows with a pain provider. She notes \"breakthrough\" pain and feels this had improved with additional oxycodone 5mg prescribed per gynecologic oncology. On further discussion, breakthrough pain is not worsening abdominal or pelvic pain, but rather her chronic back pain.     She has not yet seen her PCP or her mental health provider after her hospital discharge.     Established with cardiology yesterday- PAF, to start apixaban after her port placement (scheduled 24).    Denies fevers, chills, nausea/vomiting, constipation (preventing this with Miralax), severe/persistent abdominal or pelvic pain, paresthesias/neuropathy, difficulty breathing, rashes, or new swelling. Eating and drinking well per her report.    ONCOLOGY HISTORY:  Oncology History   Cervical cancer (H)   3/1996 Initial Diagnosis    Cervical " cancer    Moderately differentiated squamous cell carcinoma. She was treated with primary radiation therapy, unsure if she also had chemotherapy or not.  This treatment was at [facility] [place].     12/22/2014 Procedure    Exam under anesthesia with LASER to the vagina for VAIN 3     5/24/2019 Procedure    12/27/18:  Pap:  HSIL, HPV+  5/24/19: PROCEDURES: Vaginal colposcopy, vaginal biopsy, CO2 laser of the vagina  VAGINAL BIOPSY:   - High grade squamous intraepithelial lesion (vaginal intraepithelial neoplasia 3)      3/12/2020 Procedure    10/14/19: Pap HSIL/other HPV+  2/28/2020: biopsy of vagina Vain III; MRI recommended prior to OR; however, patient did not complete this  3/12/2020: EUA, biopsies, LASER: VAIN III     6/8/2021 Procedure    6/8/2021: Exam under anesthesia, vaginal biopsies, laser ablation of the upper vagina with Dr. Perez, biopsies returned showing necrosis, no dysplasia. Hyperbaric oxygen therapy discussed and referral place. Patient was unable to pursue this due to need for transportation to the [facility].     4/6/2023 Imaging    4/6/2023 CT Urogram: IMPRESSION:  1.  Negative CT urogram. No urinary calculi, solid renal mass, or evidence of upper tract urothelial malignancy.   2.  Enlarged uterus, with marked distention of the endometrial canal by intermediate density material, possibly a mix of fluid and blood products. This may be related to cervical stenosis given the history of prior pelvic radiation. Uterine enlargement may contribute to urinary frequency/urgency.   3.  Indeterminate left pelvic/perirectal mass with rim calcifications measuring up to 4.5 cm. Differentials include an old hematoma or an atypical enlarged lymph node. Consider pelvic MRI with contrast and subtraction imaging for further evaluation. The uterus and endometrium can also be further evaluated at that time.        Carcinosarcoma of body of uterus (H)   4/23/2024 Imaging    Pelvic US obtained due to concern for uterine  distention  FINDINGS:  The uterus measures 14.4 x 7.8 x 11.8 cm. There is soft tissue  material within the uterus without blood flow. The ovaries were not visualized by pelvic ultrasound. The previously described left adnexal mass is not visualized by ultrasound. There is no simple free fluid in the pelvis.                                                                       IMPRESSION:  1. Limited evaluation of the pelvis by ultrasound. The uterus contains  both cystic and solid components and the endometrium is not defined.  Further evaluation with pelvic MRI is recommended.     5/3/2024 Imaging    CT CAP: IMPRESSION:     1. New moderate bilateral hydroureteronephrosis, which may be  secondary to the mass effect of the adjacent enlarged uterus with  marked distention of the endometrial canal by intermediate density  material, which has increased since CT 4/6/2023. Findings may be  related to cervical stenosis secondary to prior pelvic radiation.     2. Similar left pelvic/perirectal mass with peripheral calcification.      3. Stable 4 mm left lower lobe solitary pulmonary nodule.     4. No evidence of metastatic disease.        5/17/2024 Surgery    D&C of the uterus with drainage of the uterine fluid under US guidance, lysis of vaginal adhesions, cystoscopy     5/17/2024 Pathology    Endometrial curettings: Endometrial serous carcinoma with extensive necrosis     7/12/2024 Surgery    Diagnostic laparoscopy converted to exploratory laparotomy, total abdominal hysterectomy, bilateral salpingo-oophorectomy, lysis of adhesions, repair bladder, insertion of suprapubic catheter     7/12/2024 Pathology    A. Pelvic mass, excision:  - Hyalinized and calcified fat necrosis  - Negative for malignancy     B. 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 Cervical Remnant:  - Necrotic cervical tissue, positive for serous carcinoma     D. Omentum, biopsy:  - Fibroadipose tissue with no significant histologic abnormality      8/8/2024 Initial Diagnosis    Carcinosarcoma of body of uterus (H)     8/8/2024 -  Chemotherapy    OP GYN/ONC Ovarian or Uterine Cancer - PACLitaxel / CARBOplatin  Plan Provider: Dany Perez MD  Treatment goal: Curative  Line of treatment: Adjuvant               Past Medical History:   Diagnosis Date    Atrial fibrillation (H)     Depression     Hypertension     Malignant neoplasm of endocervix (H)     Tx with radiation    Other chronic pain     Low back    Schizophrenia (H)     Urinary incontinence       Past Surgical History:   Procedure Laterality Date    ABDOMINOPLASTY      BIOPSY CERVICAL, LOCAL EXCISION, SINGLE/MULTIPLE  10/26/2011    Procedure:BIOPSY CERVICAL, LOCAL EXCISION, SINGLE/MULTIPLE; EUA, cervical biopsies; Surgeon:BETTY TINEO; [facility]    BIOPSY VAGINAL N/A 6/8/2021    Procedure: BIOPSY, VAGINA;  Surgeon: Dany Perez MD;  [facility]: [facility]    CYSTOSCOPY N/A 5/17/2024    Procedure: Cystoscopy;  Surgeon: Dany Perez MD;  [facility]    CYSTOSTOMY, INSERT TUBE SUPRAPUBIC, COMBINED  7/12/2024    Procedure: Insertion of supra-pubic catheter;  Surgeon: Dany Perez MD;  [facility]    DILATION AND CURETTAGE, WITH ULTRASOUND GUIDANCE N/A 5/17/2024    Procedure: Dilation and curettage of the uterus with drainage of uterine fluid under ultrasound guidance, lysis of vaginal adhesions;  Surgeon: Dany Perze MD;  [facility]    EXAM UNDER ANESTHESIA PELVIC N/A 3/12/2020    Procedure: Exam under anesthsia, vaginal biopsies, possible CO2 laser of the vagina;  Surgeon: Lilliam Roy MD;  [facility]    GI SURGERY  2004    gastric bypass    HYSTERECTOMY TOTAL ABDOMINAL, BILATERAL SALPINGO-OOPHORECTOMY, COMBINED  Bilateral 7/12/2024    Procedure: Diagnostic laparoscopy converted to exploratory laparotomy, total abdominal hysterectomy, bilateral salpingo-oophorectomy, lysis of adhesions >60 min;  Surgeon: Dany Perez MD;  [facility]    LASER CO2 VAGINA N/A 3/2/2015    Procedure: LASER CO2 VAGINA;  Surgeon: Mariela Abdalla MD;  [facility]: [facility]    LASER CO2 VAGINA N/A 5/24/2019    Procedure: Exam under anesthesia, vaginal biopsies, CO2 laser of the vagina;  Surgeon: Lilliam Roy MD;  [facility]: [facility]    LASER CO2 VAGINA N/A 6/8/2021    Procedure: Exam under anesthesia, laser ablation of the upper vagina;  Surgeon: Dany Perez MD;  [facility]    REPAIR BLADDER N/A 7/12/2024    Procedure: Repair bladder;  Surgeon: Dany Perez MD;  [facility]     Social History     Socioeconomic History    Marital status: Single   Tobacco Use    Smoking status: Never    Smokeless tobacco: Never   Substance and Sexual Activity    Alcohol use: Not Currently    Drug use: No     Social Determinants of Health     Food Insecurity: No Food Insecurity (4/13/2023)    [facility]    Hunger Vital Sign     Worried About Running Out of Food in the Last Year: Never true     Ran Out of Food in the Last Year: Never true      Family History   Problem Relation Age of Onset    Heart Disease Father     Heart Failure Father     No Known Problems Brother     No Known Problems Brother     No Known Problems Brother     Pancreatitis Brother     Hypertension Sister     Peripheral Vascular Disease Sister     No Known Problems Sister     No Known Problems Son     No Known Problems Daughter       Current Outpatient Medications   Medication Sig Dispense Refill    acetaminophen (TYLENOL) 325 MG tablet Take 2 tablets (650 mg) by mouth every 6 hours as needed for mild pain 24 tablet 0    albuterol (PROAIR HFA/PROVENTIL HFA/VENTOLIN HFA) 108 (90 Base) MCG/ACT inhaler Inhale 1-2 puffs into the  lungs every 4 hours as needed for shortness of breath      apixaban ANTICOAGULANT (ELIQUIS) 5 MG tablet Take 1 tablet (5 mg) by mouth 2 times daily 60 tablet 3    atenolol (TENORMIN) 25 MG tablet Take 1 tablet (25 mg) by mouth daily 30 tablet 0    calcium Citrate-vitamin D 500-400 MG-UNIT CHEW Take 1 tablet by mouth daily      childrens multivitamin w/iron (FLINTSTONES COMPLETE) 60 MG chewable tablet Take 2 tablets by mouth daily      cholecalciferol 125 MCG (5000 UT) CAPS Take 5000 mg 4 times a week      cyanocobalamin (CYANOCOBALAMIN) 1000 MCG/ML injection Inject 1 mL (1,000 mcg) into a muscle every month.      dexAMETHasone (DECADRON) 4 MG tablet Take 2 tablets (8 mg) by mouth daily (with breakfast) for 3 days Take daily x 3 days following chemo 6 tablet 0    diclofenac (VOLTAREN) 1 % topical gel Apply to: Skin on  Both/All Knee for pain. Topical 1% gel, 4 g applied TOPICALLY to lower extremities 3 times daily PRN ;      ferrous sulfate (CASSANDRA-IN-SOL) 75 (15 FE) MG/ML oral drops Take 15 mg by mouth daily      hydrocortisone 2.5 % cream Apply topically as needed      lidocaine (XYLOCAINE) 5 % external ointment Apply 1 Application topically as needed      naloxone (NARCAN) 4 MG/0.1ML nasal spray Spray 1 spray (4 mg) into one nostril alternating nostrils as needed for opioid reversal every 2-3 minutes until assistance arrives 2 each 0    ondansetron (ZOFRAN) 8 MG tablet Take 1 tablet (8 mg) by mouth every 8 hours as needed for nausea 20 tablet 0    oxyCODONE (ROXICODONE) 5 MG tablet Take 1 tablet (5 mg) by mouth every 6 hours as needed for breakthrough pain 30 tablet 0    polyethylene glycol (MIRALAX) 17 GM/Dose powder Take 17 g by mouth daily as needed for constipation 510 g 0    prochlorperazine (COMPAZINE) 5 MG tablet Take 1-2 tablets (5-10 mg) by mouth every 6 hours as needed for nausea or vomiting 40 tablet 0    senna-docusate (SENOKOT-S/PERICOLACE) 8.6-50 MG tablet Take 1 tablet by mouth 2 times daily 60 tablet  0    zolpidem (AMBIEN) 10 MG tablet Take 10 mg by mouth nightly as needed       No current facility-administered medications for this visit.     Facility-Administered Medications Ordered in Other Visits   Medication Dose Route Frequency Provider Last Rate Last Admin    sodium chloride 0.9 % bag TABLE SOLN  1 Bag TABLE SOLN Q5 Min PRN Avery Gregory MD          Allergies   Allergen Reactions    Ibuprofen Nausea and Vomiting    Shrimp     Sulfa Antibiotics Rash     Patient started on bactrim 7/24/24 tolerating medication without rash on 7/25           OBJECTIVE:    PHYSICAL EXAM:  No vitals- virtual visit    Constitutional: Alert and oriented non-toxic appearing female in no acute distress  HEENT: No periorbital edema or perioral cyanosis  Resp: Respirations unlabored, speaking in full sentences without apparent dyspnea, wheezing, or cough  Psych: Pleasant and interactive, affect euthymic, makes appropriate eye contact, answers questions appropriately, thought content logical      DATA:    Weight trend:  Wt Readings from Last 5 Encounters:   08/22/24 63.5 kg (140 lb)   08/20/24 62.3 kg (137 lb 6.4 oz)   08/09/24 67.7 kg (149 lb 3.2 oz)   07/25/24 75 kg (165 lb 6.4 oz)   07/15/24 81.3 kg (179 lb 3.7 oz)          ASSESSMENT/PLAN:    Uterine serous carcinoma: S/p C1 carboplatin and paclitaxel. Generally doing well from a chemotherapy tolerance perspective, denies neuropathy, constipation, nausea/vomiting, etc. No persistent abdominal or pelvic pain, one isolated episode of vaginal spotting. Port placement next week. Scheduled for toxicity check next week prior to C2 carboplatin and paclitaxel- reviewed schedule with Aranza.  Suprapubic catheter: Draining well, denies concerns regarding her urine. Missed urology appointment- needs to establish care, message sent to RNCC to help facilitate this ASAP.  Reviewed recommendations to follow up with her mental health care provider after recent hospitalization as well as her  PCP  Back pain: Chronic, following with pain provider. Discussed that all opioid prescriptions unrelated to new/acute neoplastic pain will need to come from her pain provider. Reviewed that if she develops any new or worsening abdominal or pelvic pain that she needs to be evaluated in person by a member of the gynecologic oncology team, given triage number.  Reviewed alarm s/sxs and when to seek further care  Patient verbalized understanding of and agreement with plan, daughter present during visit as well    MDM:  32 minutes spent on date of service on visit, including chart review, face to face visit, documentation, and coordination of care.    ERINN Hammond, CNP  Division of Gynecologic Oncology

## 2024-08-20 ENCOUNTER — OFFICE VISIT (OUTPATIENT)
Dept: CARDIOLOGY | Facility: CLINIC | Age: 69
End: 2024-08-20
Attending: INTERNAL MEDICINE
Payer: COMMERCIAL

## 2024-08-20 VITALS
HEART RATE: 94 BPM | OXYGEN SATURATION: 100 % | DIASTOLIC BLOOD PRESSURE: 99 MMHG | SYSTOLIC BLOOD PRESSURE: 151 MMHG | WEIGHT: 137.4 LBS | BODY MASS INDEX: 26.28 KG/M2

## 2024-08-20 DIAGNOSIS — D63.1 ANEMIA DUE TO STAGE 3A CHRONIC KIDNEY DISEASE (H): ICD-10-CM

## 2024-08-20 DIAGNOSIS — C54.9 CARCINOSARCOMA OF BODY OF UTERUS (H): ICD-10-CM

## 2024-08-20 DIAGNOSIS — I48.0 PAROXYSMAL ATRIAL FIBRILLATION (H): ICD-10-CM

## 2024-08-20 DIAGNOSIS — F20.0 PARANOID SCHIZOPHRENIA, CHRONIC CONDITION (H): Chronic | ICD-10-CM

## 2024-08-20 DIAGNOSIS — N18.31 STAGE 3A CHRONIC KIDNEY DISEASE (H): Primary | ICD-10-CM

## 2024-08-20 DIAGNOSIS — I10 BENIGN ESSENTIAL HYPERTENSION: ICD-10-CM

## 2024-08-20 DIAGNOSIS — N18.31 ANEMIA DUE TO STAGE 3A CHRONIC KIDNEY DISEASE (H): ICD-10-CM

## 2024-08-20 PROCEDURE — 93005 ELECTROCARDIOGRAM TRACING: CPT

## 2024-08-20 PROCEDURE — 93010 ELECTROCARDIOGRAM REPORT: CPT | Mod: GC | Performed by: INTERNAL MEDICINE

## 2024-08-20 PROCEDURE — G0463 HOSPITAL OUTPT CLINIC VISIT: HCPCS | Performed by: INTERNAL MEDICINE

## 2024-08-20 PROCEDURE — 99214 OFFICE O/P EST MOD 30 MIN: CPT | Mod: 24 | Performed by: INTERNAL MEDICINE

## 2024-08-20 ASSESSMENT — PAIN SCALES - GENERAL: PAINLEVEL: NO PAIN (0)

## 2024-08-20 NOTE — PROGRESS NOTES
I am delighted to see Alis AMINAH Washington in consultation.The primary encounter diagnosis was Atrial fibrillation (H). Diagnoses of Stage 3a chronic kidney disease (H), Anemia due to stage 3a chronic kidney disease (H), Benign essential hypertension, Paroxysmal atrial fibrillation (H), Carcinosarcoma of body of uterus (H), and Paranoid schizophrenia, chronic condition (H) were also pertinent to this visit.   As you know, the patient is a 69 year old -American female. She   has a past medical history of Atrial fibrillation (H), Depression, Hypertension, Malignant neoplasm of endocervix (H), Other chronic pain, Schizophrenia (H), and Urinary incontinence..    On this visit, the patient states that she was recently admitted for complications from her psych meds.  Currently, she is not taking her anticoagulation or beta blocker. She recently underwent surgery for uterine cancer. The patient denies chest pressure/discomfort, dyspnea, palpitations, near-syncope, syncope, orthopnea, and paroxysmal nocturnal dyspnea.    The patient's cardiovascular risk factors include arrhythmia and hypertension.    The following portions of the patient's history were reviewed and updated as appropriate: allergies, current medications, past family history, past medical history, past social history, past surgical history, and the problem list.    PMH: The patient's past medical history includes:    Past Medical History:   Diagnosis Date    Atrial fibrillation (H)     Depression     Hypertension     Malignant neoplasm of endocervix (H)     Tx with radiation    Other chronic pain     Low back    Schizophrenia (H)     Urinary incontinence       Past Surgical History:   Procedure Laterality Date    ABDOMINOPLASTY      BIOPSY CERVICAL, LOCAL EXCISION, SINGLE/MULTIPLE  10/26/2011    Procedure:BIOPSY CERVICAL, LOCAL EXCISION, SINGLE/MULTIPLE; EUA, cervical biopsies; Surgeon:BETTY TINEO; [facility]    BIOPSY VAGINAL N/A 6/8/2021     Procedure: BIOPSY, VAGINA;  Surgeon: Dany Perez MD;  [facility]    CYSTOSCOPY N/A 5/17/2024    Procedure: Cystoscopy;  Surgeon: Dayn Perez MD;  [facility]    CYSTOSTOMY, INSERT TUBE SUPRAPUBIC, COMBINED  7/12/2024    Procedure: Insertion of supra-pubic catheter;  Surgeon: Dany Perez MD;  [facility]    DILATION AND CURETTAGE, WITH ULTRASOUND GUIDANCE N/A 5/17/2024    Procedure: Dilation and curettage of the uterus with drainage of uterine fluid under ultrasound guidance, lysis of vaginal adhesions;  Surgeon: Dany Perez MD;  [facility]    EXAM UNDER ANESTHESIA PELVIC N/A 3/12/2020    Procedure: Exam under anesthsia, vaginal biopsies, possible CO2 laser of the vagina;  Surgeon: Lilliam Roy MD;  [facility]    GI SURGERY  2004    gastric bypass    HYSTERECTOMY TOTAL ABDOMINAL, BILATERAL SALPINGO-OOPHORECTOMY, COMBINED Bilateral 7/12/2024    Procedure: Diagnostic laparoscopy converted to exploratory laparotomy, total abdominal hysterectomy, bilateral salpingo-oophorectomy, lysis of adhesions >60 min;  Surgeon: Dany Perez MD;  [facility]    LASER CO2 VAGINA N/A 3/2/2015    Procedure: LASER CO2 VAGINA;  Surgeon: Mariela Abdalla MD;  [facility]    LASER CO2 VAGINA N/A 5/24/2019    Procedure: Exam under anesthesia, vaginal biopsies, CO2 laser of the vagina;  Surgeon: Lilliam Roy MD;  [facility]    LASER CO2 VAGINA N/A 6/8/2021    Procedure: Exam under anesthesia, laser ablation of the upper vagina;  Surgeon: Dany Perez MD;  [facility]    REPAIR BLADDER N/A 7/12/2024    Procedure: Repair bladder;  Surgeon: Dany Perez MD;  [facility]       The patient's medications as of the current encounter are:     Current Outpatient Medications   Medication Sig Dispense Refill    acetaminophen (TYLENOL) 325 MG tablet Take 2 tablets (650 mg) by mouth every 6 hours as needed for mild pain 24 tablet 0    albuterol (PROAIR  HFA/PROVENTIL HFA/VENTOLIN HFA) 108 (90 Base) MCG/ACT inhaler Inhale 1-2 puffs into the lungs every 4 hours as needed for shortness of breath      apixaban ANTICOAGULANT (ELIQUIS) 5 MG tablet Take 1 tablet (5 mg) by mouth 2 times daily 60 tablet 3    atenolol (TENORMIN) 25 MG tablet Take 1 tablet (25 mg) by mouth daily 30 tablet 0    calcium Citrate-vitamin D 500-400 MG-UNIT CHEW Take 1 tablet by mouth daily      childrens multivitamin w/iron (FLINTSTONES COMPLETE) 60 MG chewable tablet Take 2 tablets by mouth daily      cholecalciferol 125 MCG (5000 UT) CAPS Take 5000 mg 4 times a week      cyanocobalamin (CYANOCOBALAMIN) 1000 MCG/ML injection Inject 1 mL (1,000 mcg) into a muscle every month.      dexAMETHasone (DECADRON) 4 MG tablet Take 2 tablets (8 mg) by mouth daily (with breakfast) for 3 days Take daily x 3 days following chemo 6 tablet 0    diclofenac (VOLTAREN) 1 % topical gel Apply to: Skin on  Both/All Knee for pain. Topical 1% gel, 4 g applied TOPICALLY to lower extremities 3 times daily PRN ;      ferrous sulfate (CASSANDRA-IN-SOL) 75 (15 FE) MG/ML oral drops Take 15 mg by mouth daily      hydrocortisone 2.5 % cream Apply topically as needed      lidocaine (XYLOCAINE) 5 % external ointment Apply 1 Application topically as needed      naloxone (NARCAN) 4 MG/0.1ML nasal spray Spray 1 spray (4 mg) into one nostril alternating nostrils as needed for opioid reversal every 2-3 minutes until assistance arrives 2 each 0    ondansetron (ZOFRAN) 8 MG tablet Take 1 tablet (8 mg) by mouth every 8 hours as needed for nausea 20 tablet 0    oxyCODONE (ROXICODONE) 5 MG tablet Take 1 tablet (5 mg) by mouth every 6 hours as needed for breakthrough pain 30 tablet 0    polyethylene glycol (MIRALAX) 17 GM/Dose powder Take 17 g by mouth daily as needed for constipation 510 g 0    prochlorperazine (COMPAZINE) 5 MG tablet Take 1-2 tablets (5-10 mg) by mouth every 6 hours as needed for nausea or vomiting 40 tablet 0    senna-docusate  (SENOKOT-S/PERICOLACE) 8.6-50 MG tablet Take 1 tablet by mouth 2 times daily 60 tablet 0    zolpidem (AMBIEN) 10 MG tablet Take 10 mg by mouth nightly as needed         Labs:     No results displayed because visit has over 200 results.          Allergies:    Allergies   Allergen Reactions    Ibuprofen Nausea and Vomiting    Shrimp     Sulfa Antibiotics Rash     Patient started on bactrim 7/24/24 tolerating medication without rash on 7/25        Family History:   Family History   Problem Relation Age of Onset    Heart Disease Father     Heart Failure Father     No Known Problems Brother     No Known Problems Brother     No Known Problems Brother     Pancreatitis Brother     Hypertension Sister     Peripheral Vascular Disease Sister     No Known Problems Sister     No Known Problems Son     No Known Problems Daughter        Psychosocial history:  reports that she has never smoked. She has never used smokeless tobacco. She reports that she does not currently use alcohol. She reports that she does not use drugs.    Review of systems: negative for, palpitations, exertional chest pain or pressure, paroxysmal nocturnal dyspnea, dyspnea on exertion, orthopnea, and syncope or near-syncope    In addition,   General: No change in weight, sleep or appetite.  Normal energy.  No fever or chills  Eyes: Negative for vision changes or eye problems  ENT: No problems with ears, nose or throat.  No difficulty swallowing.  Resp: No coughing, wheezing or shortness of breath  GI: No nausea, vomiting,  heartburn, abdominal pain, diarrhea, constipation or change in bowel habits  : No urinary frequency or dysuria, bladder or kidney problems  Musculoskeletal: bilat knee pain  Neurologic: No headaches, numbness, tingling, weakness, problems with balance or coordination  Psychiatric: stable axis 1 disorder  Heme/immune/allergy: anemia  Endocrine: No history of thyroid disease, diabetes or other endocrine disorders  Skin: No rashes,worrisome  lesions or skin problems  Vascular:  No claudication, lifestyle limiting or otherwise; no ischemic rest pain; no non-healing ulcers. No weakness, No loss of sensation    Cervical Ca, uterine cancer      Physical examination  Vitals: BP (!) 151/99   Pulse 94   Wt 62.3 kg (137 lb 6.4 oz)   SpO2 100%   BMI 26.28 kg/m    BMI= Body mass index is 26.28 kg/m .    In general, the patient is a pleasant female in no apparent distress.    HEENT: Normiocephalic and atraumatic.  PERRLA.  EOMI.  Sclerae white, not injected.    Neck: No adenopathy.  No thyromegaly. Carotids +2/2 bilaterally without bruits.  No jugular venous distension.   Heart:  The PMI is in the 5th ICS in the midclavicular line. There is no heave. Regular rate and rhythm. Normal S1, S2 splits physiologically. No murmur, rub, click, or gallop.    Lungs: Clear to asculation.  No ronchi, wheezes, rales.  No dullness to percussion.   Abdomen: Soft, nontender, nondistended. No organomegaly. No AAA.  No bruits.   Extremities: Bilat edema to ankles. The pulses were intact bilaterally.   Neurological: The neurological examination reveal a patient who was oriented to person, place, and time.  The remainder of the examination was nonfocal.    Cardiac tests include:    8/20/24 - ecg - NSR,  AMI age undetermined  7/10/24 - echo - normal EF, no RWMA  7/10/24 - lexiscan - no ischemia on rest images    Assessment and Plan    PAF - restart DOAC after port placement for chemo this Thursday  - restart beta blocker  3. HTN - restart atenolol    The patient is to return  in 6 months. The patient understood the treatment plan as outlined above.  There were no barriers to learning. Patient accompanied by caregiver.      Robin Herrera MD

## 2024-08-20 NOTE — PATIENT INSTRUCTIONS
AFTER your port placement this week, restart apixaban.  RX sent to pharmacy.    Restart atenolol that was prescribed by another provider previously.    Follow up in 6 months with Cardiology THAO.

## 2024-08-20 NOTE — TELEPHONE ENCOUNTER
DATE OF CONSULTATION:  08/20/2024    REASON FOR CONSULTATION:  Dr. Max requested evaluation of suicide attempt.    HISTORY OF PRESENT ILLNESS:  The patient is a 51-year-old man who was brought to the hospital by EMS yesterday after he overdosed on his blood pressure medicines in a suicide attempt.  The patient's alcohol level in the emergency room was 207 and his urine drug screen was negative.  He was seen by the crisis worker, who documented patient was guarded and irritable throughout the assessment.  He talked about the stressors of having had a stroke about 2 years ago and anger towards his father as his father has an order of protection against him.  This interview was conducted via Telehealth video visit.  The patient was located at [facility] and writer was remote in [place].  Sitter was at the bedside.    The patient was alert and oriented x3.  He stated his \"life has not been the same\" since the stroke 2-1/2 years ago.  It has been hard to readjust to everything.  The patient stated because of the stroke, he can barely walk and he needs to use a cane.  He reported difficulty with the volume of his speech as well as getting the words out.  He does not drive.  He denied any problems with his vision, memory, or concentration.  The patient told me he only had thoughts of suicide yesterday.  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  He recalled he was on the phone with his boss and told his boss he was having thoughts of suicide.  He assumed his boss called 911.  The patient denied thoughts of suicide this morning.  He was irritated about being in the hospital and not having his phone.  The patient minimized and actually denied feeling sad or depressed any significant amount of time.  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  He reported good  Call from pt to check Oxycodone refill status. Writer informed this has not been signed yet.   1637 paged Dr. Perez and informed pt will call her back.   1638 return call from Dr. Perez, who states she will fill a small quantity of Oxycodone to get through a few days.   Per Dr. Perez, pt was on chronic opioid use prior to cancer and taking Oxycodone prescribed by another provider. If pain is the same as she had previously, pt should contact whomever was prescribing (notes say PCP), but if new pain related to cancer, she should be evaluated, but does not suspect recent procedure to still be causing pain.     1644 return call to pt, who states she is taking Oxycodone now for stomach pain since she had surgery, not previous back and knee pain. Writer informed per Dr. Perez, pain should be improving, suggested pt try to use oxycodone PRN instead of around the clock, informed a few day supply would be sent to pharmacy, but if pain is continuing, she should make an appt to be evaluated in clinic. Pt voiced understanding.    appetite, sleep, and energy.  He denied homicidal ideation.  He denied hallucinations.  He denied problems with anxiety or excessive worry.  He did not want to talk about the situation with his father.    MEDICATIONS:  Folic acid 1 mg daily, magnesium, and thiamine.    PAST PSYCHIATRIC HISTORY:  The patient has never seen a psychiatrist or mental health therapist.  He denied history of psychiatric diagnosis, medication, hospitalization, or prior suicide attempt.  He confirmed he had thoughts of suicide by shooting himself in 2008.  The patient stated that was after he lost his mother to cancer.  The patient has been taking Wellbutrin intermittently over the last month-and-a-half for smoking cessation.  The patient stated he takes it once or twice a week because it makes him sick when he smokes on it.    PAST MEDICAL HISTORY:  Chronic kidney disease, hypertension, stroke, atrial fibrillation, and NSTEMI.    SOCIAL HISTORY:  The patient lives alone.  He is not in a relationship currently.  The patient has 3 adult children and he said they are supportive.  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  The patient smokes about a pack a day.  He averages 1-2 glasses of alcohol once a month.  The patient used marijuana when younger.  He denied illicit drug use.    FAMILY HISTORY:  He denied any family psychiatric history.    REVIEW OF SYSTEMS:  He denied any pain, headache, dizziness, or vision changes following the overdose.  He feels cold.    MENTAL STATUS EXAMINATION:  The patient was alert and oriented x3.  He appeared a little older than his recorded age, sitting up in bed, dressed in hospital gown.  No psychomotor abnormalities or apparent weakness.  He was cooperative with questions, but guarded at times.  He made decreased eye contact.  His mood today was \"good.\"  Affect was blunted.  Speech was regular rate, rhythm, and  volume.  There were a couple of times he trailed off on what he was saying.  He denied suicidal or homicidal ideation today.  No hallucinations.  Thought form was linear and coherent.  Insight and judgment were poor.    DIAGNOSIS:  Major depressive disorder, recurrent, severe.    ASSESSMENT:  This is a 51-year-old man with history of suicidal ideation after his mother , who was admitted following a suicide attempt by overdose.  The patient admitted this was a suicide attempt.  He reported stress with work and difficulty coping with the sequelae of his stroke.  He does not want to talk about the situation with his father, which is likely a major stressor.  This morning, the patient denied depressed mood or anhedonia.  I believe he has been depressed following his stroke.  He was drinking at the time of the overdose.  There is no history of psychiatric contacts or treatment.  His suicidal thoughts years ago occurred after his mother .  The patient denied family psychiatric history.  Substance abuse may be contributory as patient was intoxicated upon presentation.    PLAN:  Continue one-to-one sitter and suicide precautions.  The patient is certified and may not leave [place].  I agree with the crisis worker that he needs transfer to inpatient psychiatry for further evaluation and treatment.  The patient was amenable to psychotherapy.  He was also open to medication.  After discussion, he elected to focus on compliance with Wellbutrin, which he is already taking for smoking cessation.  I reordered this at 150     mg twice a day.  The Integrated Behavioral Health Team may be reached through [facility] for continued collaboration.        Dictated By:  Sandra Prakash MD        D:  2024 09:10:34  T:  2024 10:01:44  MICHAEL/[email or web address]  Job:  [id number]      cc:

## 2024-08-20 NOTE — LETTER
8/20/2024      RE: Alis Hartman  [address]  [facility] [address]       Dear Colleague,    Thank you for the opportunity to participate in the care of your patient, Alis Hartman, at the [facility] [place] [facility]. Please see a copy of my visit note below.    I am delighted to see Alis Hartman in consultation.The primary encounter diagnosis was Atrial fibrillation (H). Diagnoses of Stage 3a chronic kidney disease (H), Anemia due to stage 3a chronic kidney disease (H), Benign essential hypertension, Paroxysmal atrial fibrillation (H), Carcinosarcoma of body of uterus (H), and Paranoid schizophrenia, chronic condition (H) were also pertinent to this visit.   As you know, the patient is a 69 year old -American female. She   has a past medical history of Atrial fibrillation (H), Depression, Hypertension, Malignant neoplasm of endocervix (H), Other chronic pain, Schizophrenia (H), and Urinary incontinence..    On this visit, the patient states that she was recently admitted for complications from her psych meds.  Currently, she is not taking her anticoagulation or beta blocker. She recently underwent surgery for uterine cancer. The patient denies chest pressure/discomfort, dyspnea, palpitations, near-syncope, syncope, orthopnea, and paroxysmal nocturnal dyspnea.    The patient's cardiovascular risk factors include arrhythmia and hypertension.    The following portions of the patient's history were reviewed and updated as appropriate: allergies, current medications, past family history, past medical history, past social history, past surgical history, and the problem list.    PMH: The patient's past medical history includes:    Past Medical History:   Diagnosis Date     Atrial fibrillation (H)      Depression      Hypertension      Malignant neoplasm of endocervix (H)     Tx with radiation     Other chronic pain      Low back     Schizophrenia (H)      Urinary incontinence       Past Surgical History:   Procedure Laterality Date     ABDOMINOPLASTY       BIOPSY CERVICAL, LOCAL EXCISION, SINGLE/MULTIPLE  10/26/2011    Procedure:BIOPSY CERVICAL, LOCAL EXCISION, SINGLE/MULTIPLE; EUA, cervical biopsies; Surgeon:BETTY TINEO; Location:[facility] OR     BIOPSY VAGINAL N/A 6/8/2021    Procedure: BIOPSY, VAGINA;  Surgeon: Dany Perez MD;  Location: [facility] OR     CYSTOSCOPY N/A 5/17/2024    Procedure: Cystoscopy;  Surgeon: Dany Perez MD;  Location: [facility] OR     CYSTOSTOMY, INSERT TUBE SUPRAPUBIC, COMBINED  7/12/2024    Procedure: Insertion of supra-pubic catheter;  Surgeon: Dany Perez MD;  Location: [facility]     DILATION AND CURETTAGE, WITH ULTRASOUND GUIDANCE N/A 5/17/2024    Procedure: Dilation and curettage of the uterus with drainage of uterine fluid under ultrasound guidance, lysis of vaginal adhesions;  Surgeon: Dany Perez MD;  Location: [facility] OR     EXAM UNDER ANESTHESIA PELVIC N/A 3/12/2020    Procedure: Exam under anesthsia, vaginal biopsies, possible CO2 laser of the vagina;  Surgeon: Lilliam Roy MD;  [facility]     GI SURGERY  2004    gastric bypass     HYSTERECTOMY TOTAL ABDOMINAL, BILATERAL SALPINGO-OOPHORECTOMY, COMBINED Bilateral 7/12/2024    Procedure: Diagnostic laparoscopy converted to exploratory laparotomy, total abdominal hysterectomy, bilateral salpingo-oophorectomy, lysis of adhesions >60 min;  Surgeon: Dany Perez MD;  Location: [facility] OR     LASER CO2 VAGINA N/A 3/2/2015    Procedure: LASER CO2 VAGINA;  Surgeon: Mariela Abdalla MD;  Location: [facility] OR     LASER CO2 VAGINA N/A 5/24/2019    Procedure: Exam under anesthesia, vaginal biopsies, CO2 laser of the vagina;  Surgeon: Lilliam Roy MD;  Location: [facility] OR     LASER CO2 VAGINA N/A 6/8/2021    Procedure: Exam under anesthesia, laser ablation of the upper vagina;  Surgeon: Dany Perez MD;  Location: [facility] OR     REPAIR  BLADDER N/A 7/12/2024    Procedure: Repair bladder;  Surgeon: Dany Perez MD;  Location: [facility] OR       The patient's medications as of the current encounter are:     Current Outpatient Medications   Medication Sig Dispense Refill     acetaminophen (TYLENOL) 325 MG tablet Take 2 tablets (650 mg) by mouth every 6 hours as needed for mild pain 24 tablet 0     albuterol (PROAIR HFA/PROVENTIL HFA/VENTOLIN HFA) 108 (90 Base) MCG/ACT inhaler Inhale 1-2 puffs into the lungs every 4 hours as needed for shortness of breath       apixaban ANTICOAGULANT (ELIQUIS) 5 MG tablet Take 1 tablet (5 mg) by mouth 2 times daily 60 tablet 3     atenolol (TENORMIN) 25 MG tablet Take 1 tablet (25 mg) by mouth daily 30 tablet 0     calcium Citrate-vitamin D 500-400 MG-UNIT CHEW Take 1 tablet by mouth daily       childrens multivitamin w/iron (FLINTSTONES COMPLETE) 60 MG chewable tablet Take 2 tablets by mouth daily       cholecalciferol 125 MCG (5000 UT) CAPS Take 5000 mg 4 times a week       cyanocobalamin (CYANOCOBALAMIN) 1000 MCG/ML injection Inject 1 mL (1,000 mcg) into a muscle every month.       dexAMETHasone (DECADRON) 4 MG tablet Take 2 tablets (8 mg) by mouth daily (with breakfast) for 3 days Take daily x 3 days following chemo 6 tablet 0     diclofenac (VOLTAREN) 1 % topical gel Apply to: Skin on  Both/All Knee for pain. Topical 1% gel, 4 g applied TOPICALLY to lower extremities 3 times daily PRN ;       ferrous sulfate (CASSANDRA-IN-SOL) 75 (15 FE) MG/ML oral drops Take 15 mg by mouth daily       hydrocortisone 2.5 % cream Apply topically as needed       lidocaine (XYLOCAINE) 5 % external ointment Apply 1 Application topically as needed       naloxone (NARCAN) 4 MG/0.1ML nasal spray Spray 1 spray (4 mg) into one nostril alternating nostrils as needed for opioid reversal every 2-3 minutes until assistance arrives 2 each 0     ondansetron (ZOFRAN) 8 MG tablet Take 1 tablet (8 mg) by mouth every 8 hours as needed for nausea 20 tablet 0      oxyCODONE (ROXICODONE) 5 MG tablet Take 1 tablet (5 mg) by mouth every 6 hours as needed for breakthrough pain 30 tablet 0     polyethylene glycol (MIRALAX) 17 GM/Dose powder Take 17 g by mouth daily as needed for constipation 510 g 0     prochlorperazine (COMPAZINE) 5 MG tablet Take 1-2 tablets (5-10 mg) by mouth every 6 hours as needed for nausea or vomiting 40 tablet 0     senna-docusate (SENOKOT-S/PERICOLACE) 8.6-50 MG tablet Take 1 tablet by mouth 2 times daily 60 tablet 0     zolpidem (AMBIEN) 10 MG tablet Take 10 mg by mouth nightly as needed         Labs:     No results displayed because visit has over 200 results.          Allergies:    Allergies   Allergen Reactions     Ibuprofen Nausea and Vomiting     Shrimp      Sulfa Antibiotics Rash     Patient started on bactrim 7/24/24 tolerating medication without rash on 7/25        Family History:   Family History   Problem Relation Age of Onset     Heart Disease Father      Heart Failure Father      No Known Problems Brother      No Known Problems Brother      No Known Problems Brother      Pancreatitis Brother      Hypertension Sister      Peripheral Vascular Disease Sister      No Known Problems Sister      No Known Problems Son      No Known Problems Daughter        Psychosocial history:  reports that she has never smoked. She has never used smokeless tobacco. She reports that she does not currently use alcohol. She reports that she does not use drugs.    Review of systems: negative for, palpitations, exertional chest pain or pressure, paroxysmal nocturnal dyspnea, dyspnea on exertion, orthopnea, and syncope or near-syncope    In addition,   General: No change in weight, sleep or appetite.  Normal energy.  No fever or chills  Eyes: Negative for vision changes or eye problems  ENT: No problems with ears, nose or throat.  No difficulty swallowing.  Resp: No coughing, wheezing or shortness of breath  GI: No nausea, vomiting,  heartburn, abdominal pain,  diarrhea, constipation or change in bowel habits  : No urinary frequency or dysuria, bladder or kidney problems  Musculoskeletal: bilat knee pain  Neurologic: No headaches, numbness, tingling, weakness, problems with balance or coordination  Psychiatric: stable axis 1 disorder  Heme/immune/allergy: anemia  Endocrine: No history of thyroid disease, diabetes or other endocrine disorders  Skin: No rashes,worrisome lesions or skin problems  Vascular:  No claudication, lifestyle limiting or otherwise; no ischemic rest pain; no non-healing ulcers. No weakness, No loss of sensation    Cervical Ca, uterine cancer      Physical examination  Vitals: BP (!) 151/99   Pulse 94   Wt 62.3 kg (137 lb 6.4 oz)   SpO2 100%   BMI 26.28 kg/m    BMI= Body mass index is 26.28 kg/m .    In general, the patient is a pleasant female in no apparent distress.    HEENT: Normiocephalic and atraumatic.  PERRLA.  EOMI.  Sclerae white, not injected.    Neck: No adenopathy.  No thyromegaly. Carotids +2/2 bilaterally without bruits.  No jugular venous distension.   Heart:  The PMI is in the 5th ICS in the midclavicular line. There is no heave. Regular rate and rhythm. Normal S1, S2 splits physiologically. No murmur, rub, click, or gallop.    Lungs: Clear to asculation.  No ronchi, wheezes, rales.  No dullness to percussion.   Abdomen: Soft, nontender, nondistended. No organomegaly. No AAA.  No bruits.   Extremities: Bilat edema to ankles. The pulses were intact bilaterally.   Neurological: The neurological examination reveal a patient who was oriented to person, place, and time.  The remainder of the examination was nonfocal.    Cardiac tests include:    8/20/24 - ecg - NSR,  AMI age undetermined  7/10/24 - echo - normal EF, no RWMA  7/10/24 - lexiscan - no ischemia on rest images    Assessment and Plan    PAF - restart DOAC after port placement for chemo this Thursday  - restart beta blocker  3. HTN - restart atenolol    The patient is to  return  in 6 months. The patient understood the treatment plan as outlined above.  There were no barriers to learning. Patient accompanied by caregiver.      Robin Herrera MD     Please do not hesitate to contact me if you have any questions/concerns.     Sincerely,     Robin Herrera MD

## 2024-08-21 LAB
ATRIAL RATE - MUSE: 97 BPM
DIASTOLIC BLOOD PRESSURE - MUSE: NORMAL MMHG
INTERPRETATION ECG - MUSE: NORMAL
P AXIS - MUSE: 76 DEGREES
PR INTERVAL - MUSE: 126 MS
QRS DURATION - MUSE: 68 MS
QT - MUSE: 364 MS
QTC - MUSE: 462 MS
R AXIS - MUSE: 0 DEGREES
SYSTOLIC BLOOD PRESSURE - MUSE: NORMAL MMHG
T AXIS - MUSE: 29 DEGREES
VENTRICULAR RATE- MUSE: 97 BPM

## 2024-08-22 ENCOUNTER — VIRTUAL VISIT (OUTPATIENT)
Dept: ONCOLOGY | Facility: CLINIC | Age: 69
End: 2024-08-22
Attending: OBSTETRICS & GYNECOLOGY
Payer: COMMERCIAL

## 2024-08-22 VITALS — BODY MASS INDEX: 24.8 KG/M2 | HEIGHT: 63 IN | WEIGHT: 140 LBS

## 2024-08-22 DIAGNOSIS — Z93.59 SUPRAPUBIC CATHETER (H): ICD-10-CM

## 2024-08-22 DIAGNOSIS — C54.9 CARCINOSARCOMA OF BODY OF UTERUS (H): Primary | ICD-10-CM

## 2024-08-22 PROCEDURE — 99214 OFFICE O/P EST MOD 30 MIN: CPT | Mod: 95 | Performed by: NURSE PRACTITIONER

## 2024-08-22 ASSESSMENT — PAIN SCALES - GENERAL: PAINLEVEL: EXTREME PAIN (8)

## 2024-08-22 NOTE — PROGRESS NOTES
Virtual Visit Details    Type of service:  Video Visit   Video Start Time:  2pm  Video End Time: 2:27pm    Originating Location (pt. Location): Home  Distant Location (provider location):  On-site  Platform used for Video Visit: Allyssa

## 2024-08-22 NOTE — PATIENT INSTRUCTIONS
Your visit today was with Marcia Painting CNP    Plan:    I have requested a new urology appointment be made- this needs to be done as soon as possible  I would like you to follow up with your mental health provider (Yolanda) and your primary care provider for a post-hospital follow up visit  Your port is scheduled for Monday- you are supposed to start your blood thinner from the cardiologist AFTER your port has been ploaced  You have a pre-chemo check in with [facility] on Tuesday (virtual), and then chemo on Thursday at [facility]  All pain medication should come from your pain provider (Maru)- if you are developing new or worsening abdominal pain or pelvic pain, we (Marcia or Dr. Perez) need to evaluate you in person to make sure there aren't any complications      WHEN TO CALL FOR HELP:   A fever of 100.4 F or greater   Shaking or chills   Shortness of breath   Repeated signs of bleeding, such as nose bleeds, easy bruising or black tarry stools   Mouth sores that stop you from eating or cause pain when you swallow   Nausea and vomiting that do not get better with your medications   Diarrhea that does not get better (particularly three or more loose watery stools in one day)   Extreme weakness   Feeling confused or extremely sleepy   Constipation for more than 48 hours that does not respond to laxatives   Swelling of feet or arms   Any new symptoms that you did not have before your treatments    WHO TO CALL:  Triage: [facility] triage RN: [phone]     If you have difficulty reaching triage during off hours, you can call [phone] and ask to speak to the gynecologic oncology resident on call    For urgent or emergent concerns, please call rather than sending a medical message

## 2024-08-22 NOTE — LETTER
"2024      Alis Hartman  [address]  [place] [address]      Dear Colleague,    Thank you for referring your patient, Alis Hartman, to the [facility]. Please see a copy of my visit note below.    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 -24 with acute mental status changes, concern for encephalopathy due to polypharmacy. Her medication regimen was adjust while inpatient. A Shahid was removed and she was discharged with a suprapubic catheter draining into a leg bag. She was given C1D1 carboplatin/paclitaxel while inpatient 24.    Since her discharge, she feels \"like I have good days and bad days.\" She is not feeling ill from chemotherapy, but is having pain- see below.    Continues to have discomfort at the suprapubic catheter site- feels it is improving though over time though, denies infectious symptoms. Draining into a leg bag. Forgot her urology appointments and needs to follow up.    Had one spot of vaginal bleeding, felt it occurred after straining and has not returned.    Chronic back pain- follows with a pain provider. She notes \"breakthrough\" pain and feels this had improved with additional oxycodone 5mg prescribed per gynecologic oncology. On further discussion, breakthrough pain is not worsening abdominal or pelvic pain, but rather her chronic back pain.     She has not yet seen her PCP or her mental health provider after her hospital discharge.     Established with cardiology yesterday- PAF, to start apixaban after her port placement (scheduled 24).    Denies fevers, chills, nausea/vomiting, constipation (preventing this with Miralax), " severe/persistent abdominal or pelvic pain, paresthesias/neuropathy, difficulty breathing, rashes, or new swelling. Eating and drinking well per her report.    ONCOLOGY HISTORY:  Oncology History   Cervical cancer (H)   3/1996 Initial Diagnosis    Cervical cancer    Moderately differentiated squamous cell carcinoma. She was treated with primary radiation therapy, unsure if she also had chemotherapy or not.  This treatment was at [facility] [place].     12/22/2014 Procedure    Exam under anesthesia with LASER to the vagina for VAIN 3     5/24/2019 Procedure    12/27/18:  Pap:  HSIL, HPV+  5/24/19: PROCEDURES: Vaginal colposcopy, vaginal biopsy, CO2 laser of the vagina  VAGINAL BIOPSY:   - High grade squamous intraepithelial lesion (vaginal intraepithelial neoplasia 3)      3/12/2020 Procedure    10/14/19: Pap HSIL/other HPV+  2/28/2020: biopsy of vagina Vain III; MRI recommended prior to OR; however, patient did not complete this  3/12/2020: EUA, biopsies, LASER: VAIN III     6/8/2021 Procedure    6/8/2021: Exam under anesthesia, vaginal biopsies, laser ablation of the upper vagina with Dr. Perez, biopsies returned showing necrosis, no dysplasia. Hyperbaric oxygen therapy discussed and referral place. Patient was unable to pursue this due to need for transportation to the [facility].     4/6/2023 Imaging    4/6/2023 CT Urogram: IMPRESSION:  1.  Negative CT urogram. No urinary calculi, solid renal mass, or evidence of upper tract urothelial malignancy.   2.  Enlarged uterus, with marked distention of the endometrial canal by intermediate density material, possibly a mix of fluid and blood products. This may be related to cervical stenosis given the history of prior pelvic radiation. Uterine enlargement may contribute to urinary frequency/urgency.   3.  Indeterminate left pelvic/perirectal mass with rim calcifications measuring up to 4.5 cm. Differentials include an old hematoma or an atypical enlarged lymph node. Consider pelvic  MRI with contrast and subtraction imaging for further evaluation. The uterus and endometrium can also be further evaluated at that time.        Carcinosarcoma of body of uterus (H)   4/23/2024 Imaging    Pelvic US obtained due to concern for uterine distention  FINDINGS:  The uterus measures 14.4 x 7.8 x 11.8 cm. There is soft tissue  material within the uterus without blood flow. The ovaries were not visualized by pelvic ultrasound. The previously described left adnexal mass is not visualized by ultrasound. There is no simple free fluid in the pelvis.                                                                       IMPRESSION:  1. Limited evaluation of the pelvis by ultrasound. The uterus contains  both cystic and solid components and the endometrium is not defined.  Further evaluation with pelvic MRI is recommended.     5/3/2024 Imaging    CT CAP: IMPRESSION:     1. New moderate bilateral hydroureteronephrosis, which may be  secondary to the mass effect of the adjacent enlarged uterus with  marked distention of the endometrial canal by intermediate density  material, which has increased since CT 4/6/2023. Findings may be  related to cervical stenosis secondary to prior pelvic radiation.     2. Similar left pelvic/perirectal mass with peripheral calcification.      3. Stable 4 mm left lower lobe solitary pulmonary nodule.     4. No evidence of metastatic disease.        5/17/2024 Surgery    D&C of the uterus with drainage of the uterine fluid under US guidance, lysis of vaginal adhesions, cystoscopy     5/17/2024 Pathology    Endometrial curettings: Endometrial serous carcinoma with extensive necrosis     7/12/2024 Surgery    Diagnostic laparoscopy converted to exploratory laparotomy, total abdominal hysterectomy, bilateral salpingo-oophorectomy, lysis of adhesions, repair bladder, insertion of suprapubic catheter     7/12/2024 Pathology    A. Pelvic mass, excision:  - Hyalinized and calcified fat necrosis  -  Negative for malignancy     B. 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 Cervical Remnant:  - Necrotic cervical tissue, positive for serous carcinoma     D. Omentum, biopsy:  - Fibroadipose tissue with no significant histologic abnormality      8/8/2024 Initial Diagnosis    Carcinosarcoma of body of uterus (H)     8/8/2024 -  Chemotherapy    OP GYN/ONC Ovarian or Uterine Cancer - PACLitaxel / CARBOplatin  Plan Provider: Dany Perez MD  Treatment goal: Curative  Line of treatment: Adjuvant               Past Medical History:   Diagnosis Date     Atrial fibrillation (H)      Depression      Hypertension      Malignant neoplasm of endocervix (H)     Tx with radiation     Other chronic pain     Low back     Schizophrenia (H)      Urinary incontinence       Past Surgical History:   Procedure Laterality Date     ABDOMINOPLASTY       BIOPSY CERVICAL, LOCAL EXCISION, SINGLE/MULTIPLE  10/26/2011    Procedure:BIOPSY CERVICAL, LOCAL EXCISION, SINGLE/MULTIPLE; EUA, cervical biopsies; Surgeon:BETTY TINEO; Location:[facility] OR     BIOPSY VAGINAL N/A 6/8/2021    Procedure: BIOPSY, VAGINA;  Surgeon: Dany Perez MD;  Location: [facility] OR     CYSTOSCOPY N/A 5/17/2024    Procedure: Cystoscopy;  Surgeon: Dany Perez MD;  [facility]     CYSTOSTOMY, INSERT TUBE SUPRAPUBIC, COMBINED  7/12/2024    Procedure: Insertion of supra-pubic catheter;  Surgeon: Dany Perez MD;  [facility]     DILATION AND CURETTAGE, WITH ULTRASOUND GUIDANCE N/A 5/17/2024    Procedure: Dilation and curettage of the uterus with drainage of uterine fluid under ultrasound guidance, lysis of vaginal adhesions;  Surgeon: Dany Perez MD;  [facility]     EXAM UNDER ANESTHESIA PELVIC N/A 3/12/2020     Procedure: Exam under anesthsia, vaginal biopsies, possible CO2 laser of the vagina;  Surgeon: Lilliam Roy MD;  [facility]     GI SURGERY  2004    gastric bypass     HYSTERECTOMY TOTAL ABDOMINAL, BILATERAL SALPINGO-OOPHORECTOMY, COMBINED Bilateral 7/12/2024    Procedure: Diagnostic laparoscopy converted to exploratory laparotomy, total abdominal hysterectomy, bilateral salpingo-oophorectomy, lysis of adhesions >60 min;  Surgeon: Dany Perez MD;  [facility]     LASER CO2 VAGINA N/A 3/2/2015    Procedure: LASER CO2 VAGINA;  Surgeon: Mariela Abdalla MD;  Location: [facility] OR     LASER CO2 VAGINA N/A 5/24/2019    Procedure: Exam under anesthesia, vaginal biopsies, CO2 laser of the vagina;  Surgeon: Lilliam Roy MD;  Location: [facility]     LASER CO2 VAGINA N/A 6/8/2021    Procedure: Exam under anesthesia, laser ablation of the upper vagina;  Surgeon: Dany Perez MD;  Location: [facility]     REPAIR BLADDER N/A 7/12/2024    Procedure: Repair bladder;  Surgeon: Dany Perez MD;  Location: [facility]     Social History     Socioeconomic History     Marital status: Single   Tobacco Use     Smoking status: Never     Smokeless tobacco: Never   Substance and Sexual Activity     Alcohol use: Not Currently     Drug use: No     Social Determinants of Health     Food Insecurity: No Food Insecurity (4/13/2023)    [place] [facility]    Hunger Vital Sign      Worried About Running Out of Food in the Last Year: Never true      Ran Out of Food in the Last Year: Never true      Family History   Problem Relation Age of Onset     Heart Disease Father      Heart Failure Father      No Known Problems Brother      No Known Problems Brother      No Known Problems Brother      Pancreatitis Brother      Hypertension Sister      Peripheral Vascular Disease Sister      No Known Problems Sister      No Known Problems Son      No Known Problems Daughter       Current Outpatient  Medications   Medication Sig Dispense Refill     acetaminophen (TYLENOL) 325 MG tablet Take 2 tablets (650 mg) by mouth every 6 hours as needed for mild pain 24 tablet 0     albuterol (PROAIR HFA/PROVENTIL HFA/VENTOLIN HFA) 108 (90 Base) MCG/ACT inhaler Inhale 1-2 puffs into the lungs every 4 hours as needed for shortness of breath       apixaban ANTICOAGULANT (ELIQUIS) 5 MG tablet Take 1 tablet (5 mg) by mouth 2 times daily 60 tablet 3     atenolol (TENORMIN) 25 MG tablet Take 1 tablet (25 mg) by mouth daily 30 tablet 0     calcium Citrate-vitamin D 500-400 MG-UNIT CHEW Take 1 tablet by mouth daily       childrens multivitamin w/iron (FLINTSTONES COMPLETE) 60 MG chewable tablet Take 2 tablets by mouth daily       cholecalciferol 125 MCG (5000 UT) CAPS Take 5000 mg 4 times a week       cyanocobalamin (CYANOCOBALAMIN) 1000 MCG/ML injection Inject 1 mL (1,000 mcg) into a muscle every month.       dexAMETHasone (DECADRON) 4 MG tablet Take 2 tablets (8 mg) by mouth daily (with breakfast) for 3 days Take daily x 3 days following chemo 6 tablet 0     diclofenac (VOLTAREN) 1 % topical gel Apply to: Skin on  Both/All Knee for pain. Topical 1% gel, 4 g applied TOPICALLY to lower extremities 3 times daily PRN ;       ferrous sulfate (CASSANDRA-IN-SOL) 75 (15 FE) MG/ML oral drops Take 15 mg by mouth daily       hydrocortisone 2.5 % cream Apply topically as needed       lidocaine (XYLOCAINE) 5 % external ointment Apply 1 Application topically as needed       naloxone (NARCAN) 4 MG/0.1ML nasal spray Spray 1 spray (4 mg) into one nostril alternating nostrils as needed for opioid reversal every 2-3 minutes until assistance arrives 2 each 0     ondansetron (ZOFRAN) 8 MG tablet Take 1 tablet (8 mg) by mouth every 8 hours as needed for nausea 20 tablet 0     oxyCODONE (ROXICODONE) 5 MG tablet Take 1 tablet (5 mg) by mouth every 6 hours as needed for breakthrough pain 30 tablet 0     polyethylene glycol (MIRALAX) 17 GM/Dose powder Take 17 g  by mouth daily as needed for constipation 510 g 0     prochlorperazine (COMPAZINE) 5 MG tablet Take 1-2 tablets (5-10 mg) by mouth every 6 hours as needed for nausea or vomiting 40 tablet 0     senna-docusate (SENOKOT-S/PERICOLACE) 8.6-50 MG tablet Take 1 tablet by mouth 2 times daily 60 tablet 0     zolpidem (AMBIEN) 10 MG tablet Take 10 mg by mouth nightly as needed       No current facility-administered medications for this visit.     Facility-Administered Medications Ordered in Other Visits   Medication Dose Route Frequency Provider Last Rate Last Admin     sodium chloride 0.9 % bag TABLE SOLN  1 Bag TABLE SOLN Q5 Min PRN Avery Gregory MD          Allergies   Allergen Reactions     Ibuprofen Nausea and Vomiting     Shrimp      Sulfa Antibiotics Rash     Patient started on bactrim 7/24/24 tolerating medication without rash on 7/25           OBJECTIVE:    PHYSICAL EXAM:  No vitals- virtual visit    Constitutional: Alert and oriented non-toxic appearing female in no acute distress  HEENT: No periorbital edema or perioral cyanosis  Resp: Respirations unlabored, speaking in full sentences without apparent dyspnea, wheezing, or cough  Psych: Pleasant and interactive, affect euthymic, makes appropriate eye contact, answers questions appropriately, thought content logical      DATA:    Weight trend:  Wt Readings from Last 5 Encounters:   08/22/24 63.5 kg (140 lb)   08/20/24 62.3 kg (137 lb 6.4 oz)   08/09/24 67.7 kg (149 lb 3.2 oz)   07/25/24 75 kg (165 lb 6.4 oz)   07/15/24 81.3 kg (179 lb 3.7 oz)          ASSESSMENT/PLAN:    Uterine serous carcinoma: S/p C1 carboplatin and paclitaxel. Generally doing well from a chemotherapy tolerance perspective, denies neuropathy, constipation, nausea/vomiting, etc. No persistent abdominal or pelvic pain, one isolated episode of vaginal spotting. Port placement next week. Scheduled for toxicity check next week prior to C2 carboplatin and paclitaxel- reviewed schedule with  Aranza.  Suprapubic catheter: Draining well, denies concerns regarding her urine. Missed urology appointment- needs to establish care, message sent to RNCC to help facilitate this ASAP.  Reviewed recommendations to follow up with her mental health care provider after recent hospitalization as well as her PCP  Back pain: Chronic, following with pain provider. Discussed that all opioid prescriptions unrelated to new/acute neoplastic pain will need to come from her pain provider. Reviewed that if she develops any new or worsening abdominal or pelvic pain that she needs to be evaluated in person by a member of the gynecologic oncology team, given triage number.  Reviewed alarm s/sxs and when to seek further care  Patient verbalized understanding of and agreement with plan, daughter present during visit as well    MDM:  32 minutes spent on date of service on visit, including chart review, face to face visit, documentation, and coordination of care.    ERINN Hammond, CNP  Division of Gynecologic Oncology         Virtual Visit Details    Type of service:  Video Visit   Video Start Time:  2pm  Video End Time: 2:27pm    Originating Location (pt. Location): Home  Distant Location (provider location):  On-site  Platform used for Video Visit: AmWell      Again, thank you for allowing me to participate in the care of your patient.        Sincerely,        ERINN Hammond CNP

## 2024-08-22 NOTE — NURSING NOTE
Patient confirms medications and allergies are accurate via patients echeck in completion, and or denies any changes since last reviewed/verified.       Current patient location: [facility]    Is the patient currently in the state of [place]? YES    Visit mode:VIDEO    If the visit is dropped, the patient can be reconnected by: VIDEO VISIT: Text to cell phone:   Telephone Information:   Mobile [phone]       Will anyone else be joining the visit? NO  (If patient encounters technical issues they should call [phone]    How would you like to obtain your AVS? MyChart    Are changes needed to the allergy or medication list? No    Are refills needed on medications prescribed by this physician? NO    Rooming Documentation:  Questionnaire(s) completed      Reason for visit: RECHECK    Ella KEITA

## 2024-08-26 ENCOUNTER — HOSPITAL ENCOUNTER (OUTPATIENT)
Facility: AMBULATORY SURGERY CENTER | Age: 69
Discharge: HOME | End: 2024-08-26
Attending: RADIOLOGY | Admitting: RADIOLOGY
Payer: COMMERCIAL

## 2024-08-26 ENCOUNTER — ANCILLARY PROCEDURE (OUTPATIENT)
Dept: RADIOLOGY | Facility: AMBULATORY SURGERY CENTER | Age: 69
End: 2024-08-26
Attending: OBSTETRICS & GYNECOLOGY
Payer: COMMERCIAL

## 2024-08-26 VITALS
RESPIRATION RATE: 16 BRPM | DIASTOLIC BLOOD PRESSURE: 86 MMHG | WEIGHT: 140 LBS | HEIGHT: 63 IN | OXYGEN SATURATION: 100 % | HEART RATE: 77 BPM | TEMPERATURE: 98.4 F | BODY MASS INDEX: 24.8 KG/M2 | SYSTOLIC BLOOD PRESSURE: 133 MMHG

## 2024-08-26 DIAGNOSIS — C54.9 CARCINOSARCOMA OF BODY OF UTERUS (H): ICD-10-CM

## 2024-08-26 LAB — INR PPP: 1.07 (ref 0.85–1.15)

## 2024-08-26 PROCEDURE — 85610 PROTHROMBIN TIME: CPT | Performed by: PATHOLOGY

## 2024-08-26 PROCEDURE — 36561 INSERT TUNNELED CV CATH: CPT | Performed by: RADIOLOGY

## 2024-08-26 PROCEDURE — 76937 US GUIDE VASCULAR ACCESS: CPT | Mod: 26 | Performed by: RADIOLOGY

## 2024-08-26 PROCEDURE — 36415 COLL VENOUS BLD VENIPUNCTURE: CPT

## 2024-08-26 PROCEDURE — 99152 MOD SED SAME PHYS/QHP 5/>YRS: CPT | Performed by: RADIOLOGY

## 2024-08-26 PROCEDURE — 36561 INSERT TUNNELED CV CATH: CPT

## 2024-08-26 PROCEDURE — 77001 FLUOROGUIDE FOR VEIN DEVICE: CPT | Mod: 26 | Performed by: RADIOLOGY

## 2024-08-26 DEVICE — IMPLANTABLE DEVICE: Type: IMPLANTABLE DEVICE | Site: CHEST | Status: FUNCTIONAL

## 2024-08-26 RX ORDER — FENTANYL CITRATE 50 UG/ML
INJECTION, SOLUTION INTRAMUSCULAR; INTRAVENOUS DAILY PRN
Status: DISCONTINUED | OUTPATIENT
Start: 2024-08-26 | End: 2024-08-26 | Stop reason: HOSPADM

## 2024-08-26 RX ORDER — NALOXONE HYDROCHLORIDE 0.4 MG/ML
0.2 INJECTION, SOLUTION INTRAMUSCULAR; INTRAVENOUS; SUBCUTANEOUS
Status: DISCONTINUED | OUTPATIENT
Start: 2024-08-26 | End: 2024-08-27 | Stop reason: HOSPADM

## 2024-08-26 RX ORDER — LIDOCAINE 40 MG/G
CREAM TOPICAL
Status: DISCONTINUED | OUTPATIENT
Start: 2024-08-26 | End: 2024-08-27 | Stop reason: HOSPADM

## 2024-08-26 RX ORDER — FENTANYL CITRATE 50 UG/ML
25-50 INJECTION, SOLUTION INTRAMUSCULAR; INTRAVENOUS EVERY 5 MIN PRN
Status: DISCONTINUED | OUTPATIENT
Start: 2024-08-26 | End: 2024-08-27 | Stop reason: HOSPADM

## 2024-08-26 RX ORDER — SODIUM CHLORIDE 9 MG/ML
INJECTION, SOLUTION INTRAVENOUS CONTINUOUS
Status: DISCONTINUED | OUTPATIENT
Start: 2024-08-26 | End: 2024-08-27 | Stop reason: HOSPADM

## 2024-08-26 RX ORDER — FLUMAZENIL 0.1 MG/ML
0.2 INJECTION, SOLUTION INTRAVENOUS
Status: DISCONTINUED | OUTPATIENT
Start: 2024-08-26 | End: 2024-08-27 | Stop reason: HOSPADM

## 2024-08-26 RX ORDER — LIDOCAINE HYDROCHLORIDE 10 MG/ML
INJECTION, SOLUTION EPIDURAL; INFILTRATION; INTRACAUDAL; PERINEURAL DAILY PRN
Status: DISCONTINUED | OUTPATIENT
Start: 2024-08-26 | End: 2024-08-26 | Stop reason: HOSPADM

## 2024-08-26 RX ORDER — NALOXONE HYDROCHLORIDE 0.4 MG/ML
0.4 INJECTION, SOLUTION INTRAMUSCULAR; INTRAVENOUS; SUBCUTANEOUS
Status: DISCONTINUED | OUTPATIENT
Start: 2024-08-26 | End: 2024-08-27 | Stop reason: HOSPADM

## 2024-08-26 RX ORDER — HEPARIN SODIUM (PORCINE) LOCK FLUSH IV SOLN 100 UNIT/ML 100 UNIT/ML
SOLUTION INTRAVENOUS DAILY PRN
Status: DISCONTINUED | OUTPATIENT
Start: 2024-08-26 | End: 2024-08-26 | Stop reason: HOSPADM

## 2024-08-26 RX ORDER — CEFAZOLIN SODIUM 2 G/50ML
2 SOLUTION INTRAVENOUS
Status: COMPLETED | OUTPATIENT
Start: 2024-08-26 | End: 2024-08-26

## 2024-08-26 RX ADMIN — CEFAZOLIN SODIUM 2 G: 2 SOLUTION INTRAVENOUS at 10:03

## 2024-08-26 NOTE — PROGRESS NOTES
Vascular Access Services Notes:    Ultrasound-guided lab draw done without complication. Attempted x1 in the left upper forearm with a 22 gauge x 1.75 inch B Dodd PIV needle. Primary RN was present to assist.    Time spent with pt - 15 minutes      KATHE BarriosN, RN VA-BC  Vascular Access Services  [facility]  [phone]

## 2024-08-26 NOTE — DISCHARGE INSTRUCTIONS
A collaboration between [facility] and [facility]  Experts in minimally invasive, targeted treatments performed using imaging guidance    Venous Access Device,  Port Catheter or Tunneled or Non-Tunneled Central Line Placement    Today you had a procedure today to install a venous access device; either a tunneled central vein catheter or a subcutaneous port catheter.    After you go home:  Drink plenty of fluids.  Generally 6-8 (8 ounce) glasses a day is recommended.  Resume your regular diet unless otherwise ordered by a medical provider.  Keep any applied tape/gauze dressings clean and dry.  Change tape/gauze dressings if they get wet or soiled.  You may shower the following day after procedure, however cover and protect from moisture any tape/gauze dressings.  You may let water hit and run over dried skin glue, but do not scrub.  Pat the area dry after showering.  Port placement incisions are closed with absorbable suture, meaning they do not need to be removed at a later date, and a topical skin adhesive (skin glue).  This glue will wear off in 7-14 days.  Do not remove before this time.  If 14 days have passed and residual glue is present, you may gently remove it.  Do not apply gels, lotions, or ointments to the glue site for the first 10 days as this may cause the glue to prematurely soften and fail.  Do not perform strenuous activities or lift greater than 10 pounds for the next three days.  If there is bleeding or oozing from the procedure site, apply firm pressure to the area for 5-10 minutes.  If the bleeding continues seek medical advice at the numbers below.  Mild procedure site discomfort can be treated with an ice pack and over-the-counter pain relievers.        For 24 hours after any sedation used:  Relax and take it easy.  No strenuous activities.  Do not drive or operate machines at home or at work.  No alcohol consumption.  Do not make any  important or legal decisions.    Call our Interventional Radiology (IR) service if:  If you start bleeding from the procedure site.  If you do start to bleed from the site, lie down and hold some pressure on the site.  Our radiology provider can help you decide if you need to return to the hospital.  If you have new or worsening pain related to the procedure.  If you have concerning swelling at the procedure site.  If you develop persistent nausea or vomiting.  If you develop hives or a rash or any unexplained itching.  If you have a fever of greater than 100.5  F and chills in the first 5 days after procedure.  Any other concerns related to your procedure.      [facility]  Interventional Radiology (IR)  [address]  [address] [facility] [place] [address]    Contact Number:  [phone]  (IR Nurse Triage)  Monday - Friday 7am - 4pm    After hours for urgent concerns:  [phone]  After 4pm Monday - Friday, Weekends and Holidays.   Ask for Interventional Radiology on-call.  Someone is available 24 hours a day.  [facility] toll free number:  [phone]

## 2024-08-26 NOTE — BRIEF OP NOTE
[facility]    Brief Operative Note    Pre-operative diagnosis: Carcinoma of body of uterus (H) [C54.9]  Post-operative diagnosis Same as pre-operative diagnosis    Procedure: Insert port vascular access, N/A - Chest    Surgeon: Surgeons and Role:     * Avery Gregory MD - Primary     * Iza Contreras PA-C - Fellow - Observing  Anesthesia: Moderate Sedation   Estimated Blood Loss: MInimal    Drains: None  Specimens: * No specimens in log *  Findings:   Image guided right internal jugular 6Fr 19.5cm port placement. Catheter tip in the high right atrium, catheter flushes and aspirates appropriately. Port is heparinized and ready for immediate use.  .  Complications: None.  Implants:   Implant Name Type Inv. Item Serial No.  Lot No. LRB No. Used Action   Rare PinkRT PORT POWER PORT Port   [id number] Implanted

## 2024-08-26 NOTE — CONSULTS
Interventional Radiology Pre-Procedure Focused H&P Assessment     Reason for procedure: Endometrial ca    History: 68 y/o F with a PMH of endometrial ca, a-fib on eliquis, CKDIIIa, anemia, HTN and paranoid schizophrenia who presents for port placement. Patient has been appropriately NPO and has a . She denies any lightheadedness, dizziness, n/v, f/c, cp, SOB, abdominal pain or orthopnea. She does have LE edema that is chronic.     Surgical: see Epic for surgical history    Allergies:      Allergies   Allergen Reactions    Ibuprofen Nausea and Vomiting    Shrimp     Sulfa Antibiotics Rash     Patient started on bactrim 7/24/24 tolerating medication without rash on 7/25          Medications:   Current Outpatient Medications   Medication Sig Dispense Refill    acetaminophen (TYLENOL) 325 MG tablet Take 2 tablets (650 mg) by mouth every 6 hours as needed for mild pain 24 tablet 0    albuterol (PROAIR HFA/PROVENTIL HFA/VENTOLIN HFA) 108 (90 Base) MCG/ACT inhaler Inhale 1-2 puffs into the lungs every 4 hours as needed for shortness of breath      apixaban ANTICOAGULANT (ELIQUIS) 5 MG tablet Take 1 tablet (5 mg) by mouth 2 times daily 60 tablet 3    atenolol (TENORMIN) 25 MG tablet Take 1 tablet (25 mg) by mouth daily 30 tablet 0    calcium Citrate-vitamin D 500-400 MG-UNIT CHEW Take 1 tablet by mouth daily      childrens multivitamin w/iron (FLINTSTONES COMPLETE) 60 MG chewable tablet Take 2 tablets by mouth daily      cholecalciferol 125 MCG (5000 UT) CAPS Take 5000 mg 4 times a week      cyanocobalamin (CYANOCOBALAMIN) 1000 MCG/ML injection Inject 1 mL (1,000 mcg) into a muscle every month.      dexAMETHasone (DECADRON) 4 MG tablet Take 2 tablets (8 mg) by mouth daily (with breakfast) for 3 days Take daily x 3 days following chemo 6 tablet 0    diclofenac (VOLTAREN) 1 % topical gel Apply to: Skin on  Both/All Knee for pain. Topical 1% gel, 4 g applied TOPICALLY to lower extremities 3 times daily PRN ;      ferrous  sulfate (CASSANDRA-IN-SOL) 75 (15 FE) MG/ML oral drops Take 15 mg by mouth daily      hydrocortisone 2.5 % cream Apply topically as needed      lidocaine (XYLOCAINE) 5 % external ointment Apply 1 Application topically as needed      naloxone (NARCAN) 4 MG/0.1ML nasal spray Spray 1 spray (4 mg) into one nostril alternating nostrils as needed for opioid reversal every 2-3 minutes until assistance arrives 2 each 0    ondansetron (ZOFRAN) 8 MG tablet Take 1 tablet (8 mg) by mouth every 8 hours as needed for nausea 20 tablet 0    oxyCODONE (ROXICODONE) 5 MG tablet Take 1 tablet (5 mg) by mouth every 6 hours as needed for breakthrough pain 30 tablet 0    polyethylene glycol (MIRALAX) 17 GM/Dose powder Take 17 g by mouth daily as needed for constipation 510 g 0    prochlorperazine (COMPAZINE) 5 MG tablet Take 1-2 tablets (5-10 mg) by mouth every 6 hours as needed for nausea or vomiting 40 tablet 0    senna-docusate (SENOKOT-S/PERICOLACE) 8.6-50 MG tablet Take 1 tablet by mouth 2 times daily 60 tablet 0    zolpidem (AMBIEN) 10 MG tablet Take 10 mg by mouth nightly as needed       Current Facility-Administered Medications   Medication Dose Route Frequency Provider Last Rate Last Admin    ceFAZolin (ANCEF) 2 g in dextrose 50 mL intermittent infusion  2 g Intravenous Pre-Op/Pre-procedure x 1 dose Avery Gregory MD        lidocaine (LMX4) kit   Topical Q1H PRN Avery Gregory MD        lidocaine 1 % 0.1-1 mL  0.1-1 mL Other Q1H PRN Avery Gregory MD        sodium chloride (PF) 0.9% PF flush 3 mL  3 mL Intracatheter Q8H Avery Gregory MD        sodium chloride (PF) 0.9% PF flush 3 mL  3 mL Intracatheter q1 min prn Avery Gregory MD        sodium chloride 0.9 % bag TABLE SOLN  1 Bag TABLE SOLN Q5 Min PRN Avery Gregory MD        sodium chloride 0.9 % infusion   Intravenous Continuous Avery Gregory MD             Family History:  Family History   Problem Relation Age of Onset    Heart Disease Father   "   Heart Failure Father     No Known Problems Brother     No Known Problems Brother     No Known Problems Brother     Pancreatitis Brother     Hypertension Sister     Peripheral Vascular Disease Sister     No Known Problems Sister     No Known Problems Son     No Known Problems Daughter      Social History:  Social History     Socioeconomic History    Marital status: Single   Tobacco Use    Smoking status: Never    Smokeless tobacco: Never   Substance and Sexual Activity    Alcohol use: Not Currently    Drug use: No     Social Determinants of Health     Food Insecurity: No Food Insecurity (4/13/2023)    [facility]    Hunger Vital Sign     Worried About Running Out of Food in the Last Year: Never true     Ran Out of Food in the Last Year: Never true   Interpersonal Safety: Low Risk  (8/26/2024)    Interpersonal Safety     Do you feel physically and emotionally safe where you currently live?: Yes     Within the past 12 months, have you been hit, slapped, kicked or otherwise physically hurt by someone?: No     Within the past 12 months, have you been humiliated or emotionally abused in other ways by your partner or ex-partner?: No         ROS: 10 point ROS negative other than noted above    Exam:  /85 (BP Location: Right arm)   Pulse 73   Temp 96.8  F (36  C) (Temporal)   Resp 18   Ht 1.6 m (5' 3\")   Wt 63.5 kg (140 lb)   SpO2 100%   BMI 24.80 kg/m    General: Pleasant, in no apparent distress  Skin: No redness, swelling, rashes, or drainage noted   Mallampati: Grade 2:  Soft palate, base of uvula, tonsillar pillars, and portion of posterior pharyngeal wall visible  Lungs: Lungs Clear with good breath sounds bilaterally  Heart: Normal heart sounds and rate    Iza Contreras PA-C   "

## 2024-08-27 ENCOUNTER — TELEPHONE (OUTPATIENT)
Dept: ONCOLOGY | Facility: CLINIC | Age: 69
End: 2024-08-27

## 2024-08-27 ENCOUNTER — TELEPHONE (OUTPATIENT)
Dept: UROLOGY | Facility: CLINIC | Age: 69
End: 2024-08-27
Payer: COMMERCIAL

## 2024-08-27 ENCOUNTER — VIRTUAL VISIT (OUTPATIENT)
Dept: ONCOLOGY | Facility: CLINIC | Age: 69
End: 2024-08-27
Attending: OBSTETRICS & GYNECOLOGY
Payer: COMMERCIAL

## 2024-08-27 VITALS — HEIGHT: 63 IN | BODY MASS INDEX: 24.8 KG/M2 | WEIGHT: 140 LBS

## 2024-08-27 DIAGNOSIS — C54.9 CARCINOSARCOMA OF BODY OF UTERUS (H): Primary | ICD-10-CM

## 2024-08-27 DIAGNOSIS — Z79.899 ENCOUNTER FOR LONG-TERM (CURRENT) USE OF MEDICATIONS: ICD-10-CM

## 2024-08-27 DIAGNOSIS — Z51.11 ENCOUNTER FOR ANTINEOPLASTIC CHEMOTHERAPY: ICD-10-CM

## 2024-08-27 DIAGNOSIS — D07.2 VAIN III (VAGINAL INTRAEPITHELIAL NEOPLASIA III): ICD-10-CM

## 2024-08-27 DIAGNOSIS — C53.8 MALIGNANT NEOPLASM OF OVERLAPPING SITES OF CERVIX (H): ICD-10-CM

## 2024-08-27 DIAGNOSIS — Z95.828 PORT-A-CATH IN PLACE: ICD-10-CM

## 2024-08-27 PROCEDURE — 99214 OFFICE O/P EST MOD 30 MIN: CPT | Mod: 24 | Performed by: NURSE PRACTITIONER

## 2024-08-27 PROCEDURE — G2211 COMPLEX E/M VISIT ADD ON: HCPCS | Mod: 95 | Performed by: NURSE PRACTITIONER

## 2024-08-27 RX ORDER — HEPARIN SODIUM,PORCINE 10 UNIT/ML
5-20 VIAL (ML) INTRAVENOUS DAILY PRN
Status: CANCELLED | OUTPATIENT
Start: 2024-08-30

## 2024-08-27 RX ORDER — ALBUTEROL SULFATE 90 UG/1
1-2 AEROSOL, METERED RESPIRATORY (INHALATION)
Status: CANCELLED
Start: 2024-08-30

## 2024-08-27 RX ORDER — EPINEPHRINE 1 MG/ML
0.3 INJECTION, SOLUTION INTRAMUSCULAR; SUBCUTANEOUS EVERY 5 MIN PRN
Status: CANCELLED | OUTPATIENT
Start: 2024-08-30

## 2024-08-27 RX ORDER — HEPARIN SODIUM (PORCINE) LOCK FLUSH IV SOLN 100 UNIT/ML 100 UNIT/ML
5 SOLUTION INTRAVENOUS
Status: CANCELLED | OUTPATIENT
Start: 2024-08-30

## 2024-08-27 RX ORDER — ALBUTEROL SULFATE 0.83 MG/ML
2.5 SOLUTION RESPIRATORY (INHALATION)
Status: CANCELLED | OUTPATIENT
Start: 2024-08-30

## 2024-08-27 RX ORDER — DIPHENHYDRAMINE HCL 25 MG
50 CAPSULE ORAL ONCE
Status: CANCELLED
Start: 2024-08-30

## 2024-08-27 RX ORDER — ATENOLOL 25 MG/1
25 TABLET ORAL DAILY
Qty: 30 TABLET | Refills: 0 | Status: CANCELLED | OUTPATIENT
Start: 2024-08-27

## 2024-08-27 RX ORDER — MEPERIDINE HYDROCHLORIDE 25 MG/ML
25 INJECTION INTRAMUSCULAR; INTRAVENOUS; SUBCUTANEOUS EVERY 30 MIN PRN
Status: CANCELLED | OUTPATIENT
Start: 2024-08-30

## 2024-08-27 RX ORDER — METHYLPREDNISOLONE SODIUM SUCCINATE 125 MG/2ML
125 INJECTION, POWDER, LYOPHILIZED, FOR SOLUTION INTRAMUSCULAR; INTRAVENOUS
Status: CANCELLED
Start: 2024-08-30

## 2024-08-27 RX ORDER — DIPHENHYDRAMINE HYDROCHLORIDE 50 MG/ML
50 INJECTION INTRAMUSCULAR; INTRAVENOUS
Status: CANCELLED
Start: 2024-08-30

## 2024-08-27 RX ORDER — LORAZEPAM 2 MG/ML
0.5 INJECTION INTRAMUSCULAR EVERY 4 HOURS PRN
Status: CANCELLED | OUTPATIENT
Start: 2024-08-30

## 2024-08-27 RX ORDER — PALONOSETRON 0.05 MG/ML
0.25 INJECTION, SOLUTION INTRAVENOUS ONCE
Status: CANCELLED | OUTPATIENT
Start: 2024-08-30

## 2024-08-27 ASSESSMENT — PAIN SCALES - GENERAL: PAINLEVEL: EXTREME PAIN (8)

## 2024-08-27 NOTE — PROGRESS NOTES
Follow Up Notes on Referred Patient    Date: 2024      RE: Alis Hartman  : 1955  SHERMAN: 2024    Alis Hartman is a 69 year old woman with a diagnosis of serous uterine carcinoma.  She has a remote history of cervical cancer and completed chemoradiation in the ; she also has a history of VAIN treated with laser. She is here today for follow up and disease management.    Oncology history:   24: US pelvis IMPRESSION:  1. Limited evaluation of the pelvis by ultrasound. The uterus contains  both cystic and solid components and the endometrium is not defined.  Further evaluation with pelvic MRI is recommended..    5/3/24: CT cap IMPRESSION:     1. New moderate bilateral hydroureteronephrosis, which may be  secondary to the mass effect of the adjacent enlarged uterus with  marked distention of the endometrial canal by intermediate density  material, which has increased since CT 2023. Findings may be  related to cervical stenosis secondary to prior pelvic radiation.   2. Similar left pelvic/perirectal mass with peripheral calcification.   3. Stable 4 mm left lower lobe solitary pulmonary nodule.  4. No evidence of metastatic disease.    24: Endometrial curretings:  -Endometrial serous carcinoma with extensive necrosis    24: Diagnostic laparoscopy converted to exploratory laparotomy, total abdominal hysterectomy, bilateral salpingo-oophorectomy, lysis of adhesions >60 min. Repair bladder; insertion of supra-pubic catheter  A. Pelvic mass, excision:  - Hyalinized and calcified fat necrosis  - Negative for malignancy     B. Uterus, cervix, left fallopian tube and ovary, hysterectomy with left salpingo-oophorectomy:  - Endometrial serous carcinoma, MMR-intact/o91-fhwtseax/Her2-negative  - Uterine serosal fibrous adhesions  - Left chronic salpingitis  - Ovary with inclusion cysts, negative for malignancy     C. Cervical Remnant:  - Necrotic cervical  tissue, positive for serous carcinoma     D. Omentum, biopsy:  - Fibroadipose tissue with no significant histologic abnormality     7/29/24-8/9/24: Admission for altered mental status  -7/21: CT ap IMPRESSION:   1. Surgical changes of hysterectomy and bilateral  salpingo-oophorectomy. Ill-defined inflammatory changes throughout the  low pelvis without definitive organized fluid collection such as  abscess identified.  2. Multiple foci of air seen within the abdomen, this may be due to  the recent surgery or the percutaneous abdominal drain.  3. Multiple fluid-filled loops of bowel. Fluid-filled colon is seen  down to level of the rectum suggestive of slow motility. However the  distal ileum is decompressed with possible transition point in the  anterior lower abdomen as detailed above. May represent mixed etiology  of ileus with partial obstruction. Radiographic follow-up suggested.  4. Improved now mild bilateral hydronephrosis. Slightly heterogeneous  enhancement of the kidneys. Mild urothelial enhancement of the  ureters, this is likely reactive.   5. Multiple enlarged retroperitoneal lymph nodes which are likely  reactive.  6. Soft tissue anasarca. There is also edema involving the mesentery  and pelvic soft tissues.  7. Soft tissue gas and inflammatory changes within the abdominal wall  at the site of the suprapubic catheter, no organized fluid collection  at this location.   8. Bladder is collapsed with Shahid catheter and suprapubic catheter in  place. 9. Bladder wall is difficult to discern which may be due to  adjacent inflammatory changes and timing of contrast bolus which is  slightly early. Possible defect along the right posterior bladder wall  with adjacent fluid and air. Of note, a bladder repair was made during  surgery. Could consider follow-up CT cystogram to evaluate for any  active leak in this region.  -7/29: CT head IMPRESSION:  1.  No acute intracranial process.    8/8/24: Cycle #1  Paclitaxel/Carboplatin given inpatient.  8/29/24: Cycle #2 Paclitaxel/Carboplatin planned.         Cervical cancer (H) and VAIN   3/1996 Initial Diagnosis     Cervical cancer     Moderately differentiated squamous cell carcinoma. She was treated with primary radiation therapy, unsure if she also had chemotherapy or not.  This treatment was at [facility].      12/22/2014 Procedure     Exam under anesthesia with LASER to the vagina for VAIN 3      5/24/2019 Procedure     12/27/18:  Pap:  HSIL, HPV+  5/24/19: PROCEDURES: Vaginal colposcopy, vaginal biopsy, CO2 laser of the vagina  VAGINAL BIOPSY:   - High grade squamous intraepithelial lesion (vaginal intraepithelial neoplasia 3)       3/12/2020 Procedure     10/14/19: Pap HSIL/other HPV+  2/28/2020: biopsy of vagina Vain III; MRI recommended prior to OR; however, patient did not complete this  3/12/2020: EUA, biopsies, LASER: VAIN III      6/8/2021 Procedure     6/8/2021: Exam under anesthesia, vaginal biopsies, laser ablation of the upper vagina with Dr. Perez, biopsies returned showing necrosis, no dysplasia. Hyperbaric oxygen therapy discussed and referral place. Patient was unable to pursue this due to need for transportation to the [facility].             Today she reports the following:     Suprapubic cath: has follow up with Urology 9/4  Follows with clinic for LBP  Nausea: took Decadron x 3 days; no nausea/emesis; did not take any other  Bowels: constipation; taking Miralax prn  Hydration: doing well  Appetite: good; thinks eats too much  Denies any mental status changes/confusion/memory issues  Weight: stable; reports ~140 lbs  Neuropathy: denies  Chest pain/SOB: denies  Leg swelling: denies  Fevers/chills: denies  Abdominal pain/bloating: denies  Vaginal spotting: one time with constipation          Review of Systems  See HPI      Past Medical History:    Past Medical History:   Diagnosis Date    Atrial fibrillation (H)     Depression     Hypertension      Malignant neoplasm of endocervix (H)     Tx with radiation    Other chronic pain     Low back    Schizophrenia (H)     Urinary incontinence          Past Surgical History:    Past Surgical History:   Procedure Laterality Date    ABDOMINOPLASTY      BIOPSY CERVICAL, LOCAL EXCISION, SINGLE/MULTIPLE  10/26/2011    Procedure:BIOPSY CERVICAL, LOCAL EXCISION, SINGLE/MULTIPLE; EUA, cervical biopsies; Surgeon:BETTY TINEO; Location:[facility] OR    BIOPSY VAGINAL N/A 6/8/2021    Procedure: BIOPSY, VAGINA;  Surgeon: Dany Perez MD;  Location: [facility] OR    CYSTOSCOPY N/A 5/17/2024    Procedure: Cystoscopy;  Surgeon: Dany Perez MD;  Location: [facility]    CYSTOSTOMY, INSERT TUBE SUPRAPUBIC, COMBINED  7/12/2024    Procedure: Insertion of supra-pubic catheter;  Surgeon: Dany Perez MD;  Location: [facility]    DILATION AND CURETTAGE, WITH ULTRASOUND GUIDANCE N/A 5/17/2024    Procedure: Dilation and curettage of the uterus with drainage of uterine fluid under ultrasound guidance, lysis of vaginal adhesions;  Surgeon: Dany Perez MD;  Location: [facility]    EXAM UNDER ANESTHESIA PELVIC N/A 3/12/2020    Procedure: Exam under anesthsia, vaginal biopsies, possible CO2 laser of the vagina;  Surgeon: Lilliam Roy MD;  Location: [facility]    GI SURGERY  2004    gastric bypass    HYSTERECTOMY TOTAL ABDOMINAL, BILATERAL SALPINGO-OOPHORECTOMY, COMBINED Bilateral 7/12/2024    Procedure: Diagnostic laparoscopy converted to exploratory laparotomy, total abdominal hysterectomy, bilateral salpingo-oophorectomy, lysis of adhesions >60 min;  Surgeon: Dany Perez MD;  Location: [facility]    INSERT PORT VASCULAR ACCESS N/A 8/26/2024    Procedure: Insert port vascular access;  Surgeon: Avery Gregory MD;  Location: [facility] OR    IR CHEST PORT PLACEMENT > 5 YRS OF AGE  8/26/2024    LASER CO2 VAGINA N/A 3/2/2015    Procedure: LASER CO2 VAGINA;  Surgeon: Mariela Abdalla MD;  Location: [facility] OR    LASER CO2 VAGINA N/A 5/24/2019    Procedure:  Exam under anesthesia, vaginal biopsies, CO2 laser of the vagina;  Surgeon: Lilliam Roy MD;  Location: [facility] OR    LASER CO2 VAGINA N/A 6/8/2021    Procedure: Exam under anesthesia, laser ablation of the upper vagina;  Surgeon: Dany Perez MD;  Location: [facility]    REPAIR BLADDER N/A 7/12/2024    Procedure: Repair bladder;  Surgeon: Dany Perez MD;  Location: [facility]       Current Medications:     Current Outpatient Medications   Medication Sig Dispense Refill    acetaminophen (TYLENOL) 325 MG tablet Take 2 tablets (650 mg) by mouth every 6 hours as needed for mild pain 24 tablet 0    albuterol (PROAIR HFA/PROVENTIL HFA/VENTOLIN HFA) 108 (90 Base) MCG/ACT inhaler Inhale 1-2 puffs into the lungs every 4 hours as needed for shortness of breath      apixaban ANTICOAGULANT (ELIQUIS) 5 MG tablet Take 1 tablet (5 mg) by mouth 2 times daily 60 tablet 3    atenolol (TENORMIN) 25 MG tablet Take 1 tablet (25 mg) by mouth daily 30 tablet 0    calcium Citrate-vitamin D 500-400 MG-UNIT CHEW Take 1 tablet by mouth daily      childrens multivitamin w/iron (FLINTSTONES COMPLETE) 60 MG chewable tablet Take 2 tablets by mouth daily      cholecalciferol 125 MCG (5000 UT) CAPS Take 5000 mg 4 times a week      cyanocobalamin (CYANOCOBALAMIN) 1000 MCG/ML injection Inject 1 mL (1,000 mcg) into a muscle every month.      dexAMETHasone (DECADRON) 4 MG tablet Take 2 tablets (8 mg) by mouth daily (with breakfast) for 3 days Take daily x 3 days following chemo 6 tablet 0    diclofenac (VOLTAREN) 1 % topical gel Apply to: Skin on  Both/All Knee for pain. Topical 1% gel, 4 g applied TOPICALLY to lower extremities 3 times daily PRN ;      ferrous sulfate (CASSANDRA-IN-SOL) 75 (15 FE) MG/ML oral drops Take 15 mg by mouth daily      hydrocortisone 2.5 % cream Apply topically as needed      lidocaine (XYLOCAINE) 5 % external ointment Apply 1 Application topically as needed      naloxone (NARCAN) 4 MG/0.1ML nasal spray Spray 1 spray (4 mg)  into one nostril alternating nostrils as needed for opioid reversal every 2-3 minutes until assistance arrives 2 each 0    ondansetron (ZOFRAN) 8 MG tablet Take 1 tablet (8 mg) by mouth every 8 hours as needed for nausea 20 tablet 0    oxyCODONE (ROXICODONE) 5 MG tablet Take 1 tablet (5 mg) by mouth every 6 hours as needed for breakthrough pain 30 tablet 0    polyethylene glycol (MIRALAX) 17 GM/Dose powder Take 17 g by mouth daily as needed for constipation 510 g 0    prochlorperazine (COMPAZINE) 5 MG tablet Take 1-2 tablets (5-10 mg) by mouth every 6 hours as needed for nausea or vomiting 40 tablet 0    senna-docusate (SENOKOT-S/PERICOLACE) 8.6-50 MG tablet Take 1 tablet by mouth 2 times daily 60 tablet 0    zolpidem (AMBIEN) 10 MG tablet Take 10 mg by mouth nightly as needed           Allergies:        Allergies   Allergen Reactions    Ibuprofen Nausea and Vomiting    Shrimp     Sulfa Antibiotics Rash     Patient started on bactrim 7/24/24 tolerating medication without rash on 7/25         Social History:     Social History     Tobacco Use    Smoking status: Never    Smokeless tobacco: Never   Substance Use Topics    Alcohol use: Not Currently       History   Drug Use No         Family History:     The patient's family history is notable for     Family History   Problem Relation Age of Onset    Heart Disease Father     Heart Failure Father     No Known Problems Brother     No Known Problems Brother     No Known Problems Brother     Pancreatitis Brother     Hypertension Sister     Peripheral Vascular Disease Sister     No Known Problems Sister     No Known Problems Son     No Known Problems Daughter          Physical Exam:     There were no vitals taken for this visit.  There is no height or weight on file to calculate BMI.    Respiratory: No audible wheeze, cough.      Psychiatric: appropriate mood and affect. Mentation appears normal, affect normal/bright, judgement and insight intact, normal speech       The  rest of a comprehensive physical examination is deferred due to telephone visit restrictions      Assessment:    Alis Hartman is a 69 year old woman with a diagnosis of serous uterine carcinoma.  She has a remote history of cervical cancer and completed chemoradiation in the 1990's; she also has a history of VAIN treated with laser. She is here today for follow up and disease management.    30 minutes spent on the date of the encounter doing chart review, history and exam, documentation and further activities as noted above    Virtual facilitator unable to reach patient to room  Provider called patient for phone visit; was disconnected and reconnected    Plan:     1.)      CBC, CMP, Mg, to be done 8/29 and once finalized/reviewed and meets treatment parameters, then chemotherapy will be signed and Ok to proceed with planned treatment. She will return in 3 weeks for her next cycle; this is already scheduled. Reviewed signs and symptoms for when she should contact the clinic or seek additional care. Discussed importance of eating smaller meals with lean proteins/hard boiled eggs more often, adequate hydration of at least 64 oz water throughout the day, and pacing activities.Patient to contact the clinic with any questions or concerns in the interim.  Answered all of her questions to the best of my ability.    -chemotherapy side effects were reviewed, including monitoring for neuropathy (from the Paclitaxel), managing constipation, and nausea. Continue to monitor for symptoms during treatment.     The longitudinal plan of care for the diagnosis(es)/condition(s) as documented were addressed during this visit. Due to the added complexity in care, I will continue to support Aranza in the subsequent management and with ongoing continuity of care.     2.) Genetic risk factors were assessed and the patient has intact MMR proteins.     3.) Labs and/or tests ordered include:  CBC,CMP,Mg.     4.) Health maintenance  issues addressed today include annual health maintenance and non-gynecologic issues with PCP.    ERINN Win, WHNP-BC, ANP-BC, AOCNP  Women's Health Nurse Practitioner  Adult Nurse Practitioner  Division of Gynecologic Oncology      CC  Patient Care Team:  Maria Fernanda Eckert as PCP - General (Internal Medicine)  Enoch Merritt MD as MD (Urology)  Dany Perez MD as MD (OB/Gyn)  Enoch Merritt MD as Assigned Surgical Provider  Enoch Merritt MD as MD (Urology)  Nicholas Jarrell, RN as Specialty Care Coordinator (Hematology & Oncology)  Robin Herrera MD as Assigned Heart and Vascular Provider  Marcia Painting APRN CNP as Assigned OBGYN Provider  Dany Perez MD as Assigned Cancer Care Provider

## 2024-08-27 NOTE — NURSING NOTE
Patient confirms medications and allergies are accurate via patients echeck in completion, and or denies any changes since last reviewed/verified.       Current patient location:  [place] in [place]    Is the patient currently in the state of [place]? YES    Visit mode:VIDEO    If the visit is dropped, the patient can be reconnected by: VIDEO VISIT: Text to cell phone:   Telephone Information:   Mobile [phone]       Will anyone else be joining the visit? daughter  (If patient encounters technical issues they should call [phone]    How would you like to obtain your AVS? MyChart    Are changes needed to the allergy or medication list?  Atenolol (the only one she can remember)    Are refills needed on medications prescribed by this physician? NO    Rooming Documentation:  Questionnaire(s) not done per department protocol      Reason for visit: RECHECK    Ella KEITA

## 2024-08-27 NOTE — LETTER
2024      Alis Hartman  [address]  [place] [address]      Dear Colleague,    Thank you for referring your patient, Alis Hartman, to the [facility]. Please see a copy of my visit note below.                      Follow Up Notes on Referred Patient    Date: 2024      RE: Alis Hartman  : 1955  SHERMAN: 2024    Alis Hartman is a 69 year old woman with a diagnosis of serous uterine carcinoma.  She has a remote history of cervical cancer and completed chemoradiation in the ; she also has a history of VAIN treated with laser. She is here today for follow up and disease management.    Oncology history:   24: US pelvis IMPRESSION:  1. Limited evaluation of the pelvis by ultrasound. The uterus contains  both cystic and solid components and the endometrium is not defined.  Further evaluation with pelvic MRI is recommended..    5/3/24: CT cap IMPRESSION:     1. New moderate bilateral hydroureteronephrosis, which may be  secondary to the mass effect of the adjacent enlarged uterus with  marked distention of the endometrial canal by intermediate density  material, which has increased since CT 2023. Findings may be  related to cervical stenosis secondary to prior pelvic radiation.   2. Similar left pelvic/perirectal mass with peripheral calcification.   3. Stable 4 mm left lower lobe solitary pulmonary nodule.  4. No evidence of metastatic disease.    24: Endometrial curretings:  -Endometrial serous carcinoma with extensive necrosis    24: Diagnostic laparoscopy converted to exploratory laparotomy, total abdominal hysterectomy, bilateral salpingo-oophorectomy, lysis of adhesions >60 min. Repair bladder; insertion of supra-pubic catheter  A. Pelvic mass, excision:  - Hyalinized and calcified fat necrosis  - Negative for malignancy     B. Uterus, cervix, left fallopian tube and ovary, hysterectomy with left  salpingo-oophorectomy:  - Endometrial serous carcinoma, MMR-intact/[id number]/Her2-negative  - Uterine serosal fibrous adhesions  - Left chronic salpingitis  - Ovary with inclusion cysts, negative for malignancy     C. Cervical Remnant:  - Necrotic cervical tissue, positive for serous carcinoma     D. Omentum, biopsy:  - Fibroadipose tissue with no significant histologic abnormality     7/29/24-8/9/24: Admission for altered mental status  -7/21: CT ap IMPRESSION:   1. Surgical changes of hysterectomy and bilateral  salpingo-oophorectomy. Ill-defined inflammatory changes throughout the  low pelvis without definitive organized fluid collection such as  abscess identified.  2. Multiple foci of air seen within the abdomen, this may be due to  the recent surgery or the percutaneous abdominal drain.  3. Multiple fluid-filled loops of bowel. Fluid-filled colon is seen  down to level of the rectum suggestive of slow motility. However the  distal ileum is decompressed with possible transition point in the  anterior lower abdomen as detailed above. May represent mixed etiology  of ileus with partial obstruction. Radiographic follow-up suggested.  4. Improved now mild bilateral hydronephrosis. Slightly heterogeneous  enhancement of the kidneys. Mild urothelial enhancement of the  ureters, this is likely reactive.   5. Multiple enlarged retroperitoneal lymph nodes which are likely  reactive.  6. Soft tissue anasarca. There is also edema involving the mesentery  and pelvic soft tissues.  7. Soft tissue gas and inflammatory changes within the abdominal wall  at the site of the suprapubic catheter, no organized fluid collection  at this location.   8. Bladder is collapsed with Shahid catheter and suprapubic catheter in  place. 9. Bladder wall is difficult to discern which may be due to  adjacent inflammatory changes and timing of contrast bolus which is  slightly early. Possible defect along the right posterior bladder wall  with  adjacent fluid and air. Of note, a bladder repair was made during  surgery. Could consider follow-up CT cystogram to evaluate for any  active leak in this region.  -7/29: CT head IMPRESSION:  1.  No acute intracranial process.    8/8/24: Cycle #1 Paclitaxel/Carboplatin given inpatient.  8/29/24: Cycle #2 Paclitaxel/Carboplatin planned.         Cervical cancer (H) and VAIN   3/1996 Initial Diagnosis     Cervical cancer     Moderately differentiated squamous cell carcinoma. She was treated with primary radiation therapy, unsure if she also had chemotherapy or not.  This treatment was [place] [facility] [place]      12/22/2014 Procedure     Exam under anesthesia with LASER to the vagina for VAIN 3      5/24/2019 Procedure     12/27/18:  Pap:  HSIL, HPV+  5/24/19: PROCEDURES: Vaginal colposcopy, vaginal biopsy, CO2 laser of the vagina  VAGINAL BIOPSY:   - High grade squamous intraepithelial lesion (vaginal intraepithelial neoplasia 3)       3/12/2020 Procedure     10/14/19: Pap HSIL/other HPV+  2/28/2020: biopsy of vagina Vain III; MRI recommended prior to OR; however, patient did not complete this  3/12/2020: EUA, biopsies, LASER: VAIN III      6/8/2021 Procedure     6/8/2021: Exam under anesthesia, vaginal biopsies, laser ablation of the upper vagina with Dr. Perez, biopsies returned showing necrosis, no dysplasia. Hyperbaric oxygen therapy discussed and referral place. Patient was unable to pursue this due to need for transportation to the [facility].             Today she reports the following:     Suprapubic cath: has follow up with Urology 9/4  Follows with clinic for LBP  Nausea: took Decadron x 3 days; no nausea/emesis; did not take any other  Bowels: constipation; taking Miralax prn  Hydration: doing well  Appetite: good; thinks eats too much  Denies any mental status changes/confusion/memory issues  Weight: stable; reports ~140 lbs  Neuropathy: denies  Chest pain/SOB: denies  Leg swelling: denies  Fevers/chills:  denies  Abdominal pain/bloating: denies  Vaginal spotting: one time with constipation          Review of Systems  See HPI      Past Medical History:    Past Medical History:   Diagnosis Date     Atrial fibrillation (H)      Depression      Hypertension      Malignant neoplasm of endocervix (H)     Tx with radiation     Other chronic pain     Low back     Schizophrenia (H)      Urinary incontinence          Past Surgical History:    Past Surgical History:   Procedure Laterality Date     ABDOMINOPLASTY       BIOPSY CERVICAL, LOCAL EXCISION, SINGLE/MULTIPLE  10/26/2011    Procedure:BIOPSY CERVICAL, LOCAL EXCISION, SINGLE/MULTIPLE; EUA, cervical biopsies; Surgeon:BETTY TINEO; Location:[facility] OR     BIOPSY VAGINAL N/A 6/8/2021    Procedure: BIOPSY, VAGINA;  Surgeon: Dany Perez MD;  Location: [facility] OR     CYSTOSCOPY N/A 5/17/2024    Procedure: Cystoscopy;  Surgeon: Dany Perez MD;  [facility]     CYSTOSTOMY, INSERT TUBE SUPRAPUBIC, COMBINED  7/12/2024    Procedure: Insertion of supra-pubic catheter;  Surgeon: Dany Perez MD;  [facility]     DILATION AND CURETTAGE, WITH ULTRASOUND GUIDANCE N/A 5/17/2024    Procedure: Dilation and curettage of the uterus with drainage of uterine fluid under ultrasound guidance, lysis of vaginal adhesions;  Surgeon: Dany Perez MD;  [facility]     EXAM UNDER ANESTHESIA PELVIC N/A 3/12/2020    Procedure: Exam under anesthsia, vaginal biopsies, possible CO2 laser of the vagina;  Surgeon: Lilliam Roy MD;  [facility]     GI SURGERY  2004    gastric bypass     HYSTERECTOMY TOTAL ABDOMINAL, BILATERAL SALPINGO-OOPHORECTOMY, COMBINED Bilateral 7/12/2024    Procedure: Diagnostic laparoscopy converted to exploratory laparotomy, total abdominal hysterectomy, bilateral salpingo-oophorectomy, lysis of adhesions >60 min;  Surgeon: Dany Perez MD;  [facility]     INSERT PORT VASCULAR ACCESS N/A 8/26/2024    Procedure: Insert port vascular access;   Surgeon: Avery Gregory MD;  [facility]     IR CHEST PORT PLACEMENT > 5 YRS OF AGE  8/26/2024     LASER CO2 VAGINA N/A 3/2/2015    Procedure: LASER CO2 VAGINA;  Surgeon: Mariela Abdalla MD;  Location: [facility]     LASER CO2 VAGINA N/A 5/24/2019    Procedure: Exam under anesthesia, vaginal biopsies, CO2 laser of the vagina;  Surgeon: Lilliam Roy MD;  Location: [facility]     LASER CO2 VAGINA N/A 6/8/2021    Procedure: Exam under anesthesia, laser ablation of the upper vagina;  Surgeon: Dany Perez MD;  Location: [facility]     REPAIR BLADDER N/A 7/12/2024    Procedure: Repair bladder;  Surgeon: Dany Perez MD;  [facility]       Current Medications:     Current Outpatient Medications   Medication Sig Dispense Refill     acetaminophen (TYLENOL) 325 MG tablet Take 2 tablets (650 mg) by mouth every 6 hours as needed for mild pain 24 tablet 0     albuterol (PROAIR HFA/PROVENTIL HFA/VENTOLIN HFA) 108 (90 Base) MCG/ACT inhaler Inhale 1-2 puffs into the lungs every 4 hours as needed for shortness of breath       apixaban ANTICOAGULANT (ELIQUIS) 5 MG tablet Take 1 tablet (5 mg) by mouth 2 times daily 60 tablet 3     atenolol (TENORMIN) 25 MG tablet Take 1 tablet (25 mg) by mouth daily 30 tablet 0     calcium Citrate-vitamin D 500-400 MG-UNIT CHEW Take 1 tablet by mouth daily       childrens multivitamin w/iron (FLINTSTONES COMPLETE) 60 MG chewable tablet Take 2 tablets by mouth daily       cholecalciferol 125 MCG (5000 UT) CAPS Take 5000 mg 4 times a week       cyanocobalamin (CYANOCOBALAMIN) 1000 MCG/ML injection Inject 1 mL (1,000 mcg) into a muscle every month.       dexAMETHasone (DECADRON) 4 MG tablet Take 2 tablets (8 mg) by mouth daily (with breakfast) for 3 days Take daily x 3 days following chemo 6 tablet 0     diclofenac (VOLTAREN) 1 % topical gel Apply to: Skin on  Both/All Knee for pain. Topical 1% gel, 4 g applied TOPICALLY to lower extremities 3 times daily PRN ;       ferrous  sulfate (CASSANDRA-IN-SOL) 75 (15 FE) MG/ML oral drops Take 15 mg by mouth daily       hydrocortisone 2.5 % cream Apply topically as needed       lidocaine (XYLOCAINE) 5 % external ointment Apply 1 Application topically as needed       naloxone (NARCAN) 4 MG/0.1ML nasal spray Spray 1 spray (4 mg) into one nostril alternating nostrils as needed for opioid reversal every 2-3 minutes until assistance arrives 2 each 0     ondansetron (ZOFRAN) 8 MG tablet Take 1 tablet (8 mg) by mouth every 8 hours as needed for nausea 20 tablet 0     oxyCODONE (ROXICODONE) 5 MG tablet Take 1 tablet (5 mg) by mouth every 6 hours as needed for breakthrough pain 30 tablet 0     polyethylene glycol (MIRALAX) 17 GM/Dose powder Take 17 g by mouth daily as needed for constipation 510 g 0     prochlorperazine (COMPAZINE) 5 MG tablet Take 1-2 tablets (5-10 mg) by mouth every 6 hours as needed for nausea or vomiting 40 tablet 0     senna-docusate (SENOKOT-S/PERICOLACE) 8.6-50 MG tablet Take 1 tablet by mouth 2 times daily 60 tablet 0     zolpidem (AMBIEN) 10 MG tablet Take 10 mg by mouth nightly as needed           Allergies:        Allergies   Allergen Reactions     Ibuprofen Nausea and Vomiting     Shrimp      Sulfa Antibiotics Rash     Patient started on bactrim 7/24/24 tolerating medication without rash on 7/25         Social History:     Social History     Tobacco Use     Smoking status: Never     Smokeless tobacco: Never   Substance Use Topics     Alcohol use: Not Currently       History   Drug Use No         Family History:     The patient's family history is notable for     Family History   Problem Relation Age of Onset     Heart Disease Father      Heart Failure Father      No Known Problems Brother      No Known Problems Brother      No Known Problems Brother      Pancreatitis Brother      Hypertension Sister      Peripheral Vascular Disease Sister      No Known Problems Sister      No Known Problems Son      No Known Problems Daughter           Physical Exam:     There were no vitals taken for this visit.  There is no height or weight on file to calculate BMI.    Respiratory: No audible wheeze, cough.      Psychiatric: appropriate mood and affect. Mentation appears normal, affect normal/bright, judgement and insight intact, normal speech       The rest of a comprehensive physical examination is deferred due to telephone visit restrictions      Assessment:    Alis Hartman is a 69 year old woman with a diagnosis of serous uterine carcinoma.  She has a remote history of cervical cancer and completed chemoradiation in the 1990's; she also has a history of VAIN treated with laser. She is here today for follow up and disease management.    30 minutes spent on the date of the encounter doing chart review, history and exam, documentation and further activities as noted above    Virtual facilitator unable to reach patient to room  Provider called patient for phone visit; was disconnected and reconnected    Plan:     1.)      CBC, CMP, Mg, to be done 8/29 and once finalized/reviewed and meets treatment parameters, then chemotherapy will be signed and Ok to proceed with planned treatment. She will return in 3 weeks for her next cycle; this is already scheduled. Reviewed signs and symptoms for when she should contact the clinic or seek additional care. Discussed importance of eating smaller meals with lean proteins/hard boiled eggs more often, adequate hydration of at least 64 oz water throughout the day, and pacing activities.Patient to contact the clinic with any questions or concerns in the interim.  Answered all of her questions to the best of my ability.    -chemotherapy side effects were reviewed, including monitoring for neuropathy (from the Paclitaxel), managing constipation, and nausea. Continue to monitor for symptoms during treatment.     The longitudinal plan of care for the diagnosis(es)/condition(s) as documented were addressed during  this visit. Due to the added complexity in care, I will continue to support Aranza in the subsequent management and with ongoing continuity of care.     2.) Genetic risk factors were assessed and the patient has intact MMR proteins.     3.) Labs and/or tests ordered include:  CBC,CMP,Mg.     4.) Health maintenance issues addressed today include annual health maintenance and non-gynecologic issues with PCP.    ERINN Win, WHNP-BC, ANP-BC, AOCNP  Women's Health Nurse Practitioner  Adult Nurse Practitioner  Division of Gynecologic Oncology      CC  Patient Care Team:  Maria Fernanda Eckert as PCP - General (Internal Medicine)  Enoch Merritt MD as MD (Urology)  Dany Perez MD as MD (OB/Gyn)  Enoch Merritt MD as Assigned Surgical Provider  Enoch Merritt MD as MD (Urology)  Nicholas Jarrell, RN as Specialty Care Coordinator (Hematology & Oncology)  Robin Herrera MD as Assigned Heart and Vascular Provider  Marcia Painting APRN CNP as Assigned OBGYN Provider  Dany Perez MD as Assigned Cancer Care Provider      Again, thank you for allowing me to participate in the care of your patient.        Sincerely,        ERINN Sweet CNP

## 2024-08-27 NOTE — TELEPHONE ENCOUNTER
Patient confirmed scheduled appointment:  Date: Sept 4th  Time: 8am  Visit type: New   Provider: Dr. Monroy   Location: [facility] [place]  Additional notes: Per message received -Diagnosis: surgery 7/12/24 laparotomy, hysterectomy, bladder repair   Visit Type: return post op   Provider: Dr. Monroy   Schedule: 9/4/24 at 8:00am (ok'd with Sonia to fill new slot)

## 2024-08-27 NOTE — TELEPHONE ENCOUNTER
Patient needs to be rescheduled for their virtual visit due to Reason for Reschedule: Patient Request    Scheduling team, please refer to service line late cancellation/no-show policies and reach out to patient at a later date for rescheduling.    Appointment mode: Video  Provider: Do Mendiola    Patient did not answer pre call until Appt time and then could not get connected to link sent, asked to reschedule.

## 2024-08-28 ENCOUNTER — TELEPHONE (OUTPATIENT)
Dept: ONCOLOGY | Facility: CLINIC | Age: 69
End: 2024-08-28
Payer: COMMERCIAL

## 2024-08-28 NOTE — PROGRESS NOTES
CLINICAL NUTRITION SERVICES- ONCOLOGY DISTRESS SCREENING     Identified Concern and Score From Distress Screenin. How concerned are you about your ability to eat? :  10  2. How concerned are you about unintended weight loss or your current weight? : 10     Date of Distress Screenin/22     Findings: Phone call with Aranza. Reports that her appetite has been good. Currently eating 3 meals per day + snacks.     Weight trends: Weight is overall down. Has been stable the past week.   Wt Readings from Last 10 Encounters:   24 63.5 kg (140 lb)   24 63.5 kg (140 lb)   24 63.5 kg (140 lb)   24 62.3 kg (137 lb 6.4 oz)   24 67.7 kg (149 lb 3.2 oz)   24 75 kg (165 lb 6.4 oz)   07/15/24 81.3 kg (179 lb 3.7 oz)   24 73 kg (161 lb)   24 74.4 kg (164 lb 0.4 oz)   05/15/24 75.3 kg (166 lb 1.6 oz)      Intervention: Discussed current PO intake and importance of adequate calories and protein for weight maintenance. Aranza reports that she drank ensure while she was in the hospital, discussed that patient can continue to drink Ensure or other nutrition supplements while at home.      Follow-up Required: As needed.     Kathy Amaya RD, LD  [phone]

## 2024-08-29 ENCOUNTER — PATIENT OUTREACH (OUTPATIENT)
Dept: CARE COORDINATION | Facility: CLINIC | Age: 69
End: 2024-08-29
Payer: COMMERCIAL

## 2024-08-29 NOTE — PROGRESS NOTES
Social Work - Distress Screen Intervention  [facility]    Identified Concern and Score from Distress Screenin. How concerned are you about your ability to eat? (!) 10     2. How concerned are you about unintended weight loss or your current weight? (!) 10     3. How concerned are you about feeling depressed or very sad?  0     4. How concerned are you about feeling anxious or very scared?  0     5. Do you struggle with the loss of meaning and ruby in your life?  Not at all     6. How concerned are you about work and home life issues that may be affected by your cancer?  (!) 10     7. How concerned are you about knowing what resources are available to help you?  (!) 10     8. Do you currently have what you would describe as Congregational or spiritual struggles? Not at all     9. If you want to be contacted by one of our professionals, I can send a message to them right now.  Oncology Spiritual Care       Date of Distress Screen:  24  Data: At time of last visit, patient scored positive on distress screening.  outreached to patient today to follow up on elevated distress and introduce psychosocial services and support.  Intervention/Education provided:  contacted patient by phone to discuss distress screening results. Pt requested that SW reach out to pt at a later date as she has a stomach ache today and is not up to talking with SW.    Follow-up Required: SW will reach out to pt at a later date.    Gee Chapman, Casual  for Ella Blakely,   [facility]  [phone]

## 2024-09-03 NOTE — TELEPHONE ENCOUNTER
MEDICAL RECORDS REQUEST   [facility]  Urology Clinic  [address] [place] [address]  PHONE: [phone]  Fax: [phone]        FUTURE VISIT INFORMATION                                                   Juanmariana AMINAH Hartman, : 1955 scheduled for future visit at [facility]    APPOINTMENT INFORMATION:  Date: 2024  Provider:  Black Monroy MD  Reason for Visit/Diagnosis: laparotomy, hysterectomy, bladder repair post op      RECORDS REQUESTED FOR VISIT                                                     NOTES  STATUS/DETAILS   OFFICE NOTE from other specialist  yes   DISCHARGE SUMMARY from hospital  yes   DISCHARGE REPORT from the ER  yes   OPERATIVE REPORT  yes   MEDICATION LIST  yes   LABS     URINALYSIS (UA)  yes   IMAGES  yes, 2024 -- IR CHEST  2024 -- CT ABD PELVIS  2024 -- US RENAL  2024 -- CT CHEST ABD PELVIS  2024 -- US PELVIS  2023 -- CT UROGRAM     PRE-VISIT CHECKLIST      Joint diagnostic appointment coordinated correctly          (ensure right order & amount of time) Yes   RECORD COLLECTION COMPLETE Yes

## 2024-09-04 ENCOUNTER — PRE VISIT (OUTPATIENT)
Dept: UROLOGY | Facility: CLINIC | Age: 69
End: 2024-09-04

## 2024-09-04 ENCOUNTER — OFFICE VISIT (OUTPATIENT)
Dept: UROLOGY | Facility: CLINIC | Age: 69
End: 2024-09-04
Payer: COMMERCIAL

## 2024-09-04 VITALS
HEIGHT: 63 IN | SYSTOLIC BLOOD PRESSURE: 117 MMHG | BODY MASS INDEX: 24.8 KG/M2 | WEIGHT: 140 LBS | DIASTOLIC BLOOD PRESSURE: 76 MMHG | HEART RATE: 74 BPM

## 2024-09-04 DIAGNOSIS — S37.20XS INJURY OF BLADDER, SEQUELA: Primary | ICD-10-CM

## 2024-09-04 PROCEDURE — 99024 POSTOP FOLLOW-UP VISIT: CPT | Performed by: UROLOGY

## 2024-09-04 ASSESSMENT — PAIN SCALES - GENERAL: PAINLEVEL: SEVERE PAIN (7)

## 2024-09-04 NOTE — PROGRESS NOTES
Alis Hartman is a 69 year old female who underwent a major GYN ONC surgery -   On 7/12/2024.  Intraoperatively some bladder was resected.  We performed a complex bladder closure with omental flap.  There was no leak.  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  Thus, I left an SPT and a kent  Now kent is out. Drain is out.  She actually prefers her QOL with SPT rather than no catheter like before surgery.  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    Vital signs reviewed    Exam:  Incision c/d/I  SPT draining clear urine    A/P       Excellent outcome after complex bladder closure with SPT    I removed old SPT and replaced new 16 Fr SPT with 10cc saline in the balloon.  Catheter irrigated well.  She has a small capacity bladder.    I discussed removing catheter, but I worry about her bladder function and QOL.  Thus, I recommend SPT for foreseeable future.    She should have this changed monthly.  We made nursing visit apt for these changes.  Has ongoing followup with Dr. Perez.  Has apts for infusion + medical oncology.    Black Monroy MD

## 2024-09-04 NOTE — PATIENT INSTRUCTIONS
Please follow up in 4 weeks for a nurse visit for a cath exchange.     It was a pleasure meeting with you today.  Thank you for allowing me and my team the privilege of caring for you today.  YOU are the reason we are here, and I truly hope we provided you with the excellent service you deserve.  Please let us know if there is anything else we can do for you so that we can be sure you are leaving completely satisfied with your care experience.

## 2024-09-04 NOTE — NURSING NOTE
"Chief Complaint   Patient presents with    Follow Up       Blood pressure 117/76, pulse 74, height 1.6 m (5' 3\"), weight 63.5 kg (140 lb), not currently breastfeeding. Body mass index is 24.8 kg/m .    Patient Active Problem List   Diagnosis    HSIL on Pap smear    Cervical cancer (H)    History of urinary tract infection    Vaginal dysplasia    VAIN III (vaginal intraepithelial neoplasia III)    NO SHOW    Carpal tunnel syndrome    Chronic low back pain    Gastric bypass status for obesity    Generalized anxiety disorder    Iron deficiency anemia    Knee pain    Opiate dependence, continuous (H)    Obesity    Osteoarthrosis    Paranoid schizophrenia, chronic condition (H)    Peripheral edema    Vitamin B12 deficiency    Vitamin D deficiency    Stage 3a chronic kidney disease (H)    Anemia due to chronic kidney disease    Thrombocytosis    Benign essential hypertension    Pre-operative cardiovascular examination    Paroxysmal atrial fibrillation (H)    S/P hysterectomy    Lower abdominal pain    Acute cystitis without hematuria    Dysarthria    MADHU (acute kidney injury) (H24)    AMS (altered mental status)    Carcinosarcoma of body of uterus (H)    Port-A-Cath in place       Allergies   Allergen Reactions    Ibuprofen Nausea and Vomiting    Shrimp     Sulfa Antibiotics Rash     Patient started on bactrim 7/24/24 tolerating medication without rash on 7/25        Current Outpatient Medications   Medication Sig Dispense Refill    acetaminophen (TYLENOL) 325 MG tablet Take 2 tablets (650 mg) by mouth every 6 hours as needed for mild pain 24 tablet 0    albuterol (PROAIR HFA/PROVENTIL HFA/VENTOLIN HFA) 108 (90 Base) MCG/ACT inhaler Inhale 1-2 puffs into the lungs every 4 hours as needed for shortness of breath      apixaban ANTICOAGULANT (ELIQUIS) 5 MG tablet Take 1 tablet (5 mg) by mouth 2 times daily 60 tablet 3    atenolol (TENORMIN) 25 MG tablet Take 1 tablet (25 mg) by mouth daily 30 tablet 0    calcium " Citrate-vitamin D 500-400 MG-UNIT CHEW Take 1 tablet by mouth daily      childrens multivitamin w/iron (FLINTSTONES COMPLETE) 60 MG chewable tablet Take 2 tablets by mouth daily      cholecalciferol 125 MCG (5000 UT) CAPS Take 5000 mg 4 times a week      cyanocobalamin (CYANOCOBALAMIN) 1000 MCG/ML injection Inject 1 mL (1,000 mcg) into a muscle every month.      dexAMETHasone (DECADRON) 4 MG tablet Take 2 tablets (8 mg) by mouth daily (with breakfast) for 3 days Take daily x 3 days following chemo 6 tablet 0    diclofenac (VOLTAREN) 1 % topical gel Apply to: Skin on  Both/All Knee for pain. Topical 1% gel, 4 g applied TOPICALLY to lower extremities 3 times daily PRN ;      ferrous sulfate (CASSANDRA-IN-SOL) 75 (15 FE) MG/ML oral drops Take 15 mg by mouth daily      hydrocortisone 2.5 % cream Apply topically as needed      lidocaine (XYLOCAINE) 5 % external ointment Apply 1 Application topically as needed      naloxone (NARCAN) 4 MG/0.1ML nasal spray Spray 1 spray (4 mg) into one nostril alternating nostrils as needed for opioid reversal every 2-3 minutes until assistance arrives 2 each 0    ondansetron (ZOFRAN) 8 MG tablet Take 1 tablet (8 mg) by mouth every 8 hours as needed for nausea 20 tablet 0    oxyCODONE (ROXICODONE) 5 MG tablet Take 1 tablet (5 mg) by mouth every 6 hours as needed for breakthrough pain 30 tablet 0    polyethylene glycol (MIRALAX) 17 GM/Dose powder Take 17 g by mouth daily as needed for constipation 510 g 0    prochlorperazine (COMPAZINE) 5 MG tablet Take 1-2 tablets (5-10 mg) by mouth every 6 hours as needed for nausea or vomiting 40 tablet 0    senna-docusate (SENOKOT-S/PERICOLACE) 8.6-50 MG tablet Take 1 tablet by mouth 2 times daily 60 tablet 0    zolpidem (AMBIEN) 10 MG tablet Take 10 mg by mouth nightly as needed         Social History     Tobacco Use    Smoking status: Never    Smokeless tobacco: Never   Substance Use Topics    Alcohol use: Not Currently    Drug use: No       Kathy  Felix  9/4/2024  9:45 AM

## 2024-09-04 NOTE — LETTER
9/4/2024       RE: Alis Hartman  [address]  [place] [address]     Dear Colleague,    Thank you for referring your patient, Alis Hartman, to the [facility] [place] at [facility]. Please see a copy of my visit note below.    Alis Hartman is a 69 year old female who underwent a major GYN ONC surgery -   On 7/12/2024.  Intraoperatively some bladder was resected.  We performed a complex bladder closure with omental flap.  There was no leak.  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  Thus, I left an SPT and a kent  Now kent is out. Drain is out.  She actually prefers her QOL with SPT rather than no catheter like before surgery.  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    Vital signs reviewed    Exam:  Incision c/d/I  SPT draining clear urine    A/P       Excellent outcome after complex bladder closure with SPT    I removed old SPT and replaced new 16 Fr SPT with 10cc saline in the balloon.  Catheter irrigated well.  She has a small capacity bladder.    I discussed removing catheter, but I worry about her bladder function and QOL.  Thus, I recommend SPT for foreseeable future.    She should have this changed monthly.  We made nursing visit apt for these changes.  Has ongoing followup with Dr. Perez.  Has apts for infusion + medical oncology.    Black Monroy MD      Again, thank you for allowing me to participate in the care of your patient.      Sincerely,    Black Monroy MD

## 2024-09-06 ENCOUNTER — PATIENT OUTREACH (OUTPATIENT)
Dept: ONCOLOGY | Facility: CLINIC | Age: 69
End: 2024-09-06
Payer: COMMERCIAL

## 2024-09-06 ENCOUNTER — PATIENT OUTREACH (OUTPATIENT)
Dept: CARE COORDINATION | Facility: CLINIC | Age: 69
End: 2024-09-06
Payer: COMMERCIAL

## 2024-09-06 DIAGNOSIS — D07.2 VAIN III (VAGINAL INTRAEPITHELIAL NEOPLASIA III): Primary | ICD-10-CM

## 2024-09-06 NOTE — PROGRESS NOTES
[facility]: Cancer Care Note                                                          Received voicemail message from patient this morning, calling regarding her chemotherapy infusion appointment that is scheduled for today at the [facility].  Patient states she has a ride to get there, but hasn't been able to secure a ride home - and is reaching out to discuss possible options.      High priority message routed to Oncology Social Worker, Ella, this morning, with request to reach out to patient and discuss possible transportation resources that might be available to her.      Nicholas Jarrell, RN, BSN, OCN  RN Care Coordinator - Oncology  [facility]

## 2024-09-06 NOTE — PROGRESS NOTES
[facility]: Cancer Care Note                                                          Received update from scheduling team that patient was unable to make it to her appointments today for labs and chemotherapy at the [place] location, as she ended up being unable to secure a ride there.  , Ella, was notified.    Writer worked with infusion charge nurse and scheduling team to reschedule patient's appointments to [place] for Monday next week, 9/9/24.  Scheduling team called patient this afternoon, and confirmed she would be able to make these appointments.      Nicholas Jarrell, RN, BSN, OCN  RN Care Coordinator - Oncology  [facility]

## 2024-09-06 NOTE — PROGRESS NOTES
Social Work - Transportation  [facility]    Data/Intervention: Dx serous uterine carcinoma follows with Dr. Perez.  Patient Name: Alis Hartman Goes By: Aranza ANNEB/Age: 1955 (69 year old)  Referral From: Nicholas Muse, [facility]   Reason for Referral:  support requested for patient transportation needs for a return appointment at [address] ([phone].  Assessment:  called [facility] to arrange ride through patient's insurance. [facility] [phone] arranged  for patient from [facility] with [facility] and they set up 1600 or 4PM  from [place] to her home with Transportation plus cab company.  Plan: Patient is aware of the transportation plan. (SW left a )  available to assist with any other needs.  WILLIAM Jimenez, [facility]   Adult Oncology - [place]  [phone]  [employer] [place] on    *Please note does not work on .   Support Groups at [facility]: Social Work Services for Cancer Patients ([email or web address])

## 2024-09-06 NOTE — PROGRESS NOTES
Social Work Note  [facility]    SW notified that patient did not go to infusion appointment.   Called [facility] [phone] and cancelled return ride home.  Patient can call [facility] to set up future rides at [phone].  WILLIAM Jimenez, [facility]   Adult Oncology - [place]/[facility]  [phone]  [employer] [place] on Tuesdays   *Please note does not work on Thursdays.   Support Groups at [facility]: Social Work Services for Cancer Patients ([email or web address])

## 2024-09-09 ENCOUNTER — INFUSION THERAPY VISIT (OUTPATIENT)
Dept: INFUSION THERAPY | Facility: CLINIC | Age: 69
End: 2024-09-09
Attending: OBSTETRICS & GYNECOLOGY
Payer: COMMERCIAL

## 2024-09-09 VITALS
SYSTOLIC BLOOD PRESSURE: 119 MMHG | TEMPERATURE: 98.3 F | HEART RATE: 58 BPM | WEIGHT: 151.8 LBS | OXYGEN SATURATION: 100 % | BODY MASS INDEX: 26.89 KG/M2 | DIASTOLIC BLOOD PRESSURE: 75 MMHG | RESPIRATION RATE: 18 BRPM

## 2024-09-09 DIAGNOSIS — C54.9 CARCINOSARCOMA OF BODY OF UTERUS (H): Primary | ICD-10-CM

## 2024-09-09 DIAGNOSIS — C54.9 CARCINOSARCOMA OF BODY OF UTERUS (H): ICD-10-CM

## 2024-09-09 LAB
ALBUMIN SERPL BCG-MCNC: 2.5 G/DL (ref 3.5–5.2)
ALP SERPL-CCNC: 96 U/L (ref 40–150)
ALT SERPL W P-5'-P-CCNC: <5 U/L (ref 0–50)
ANION GAP SERPL CALCULATED.3IONS-SCNC: 7 MMOL/L (ref 7–15)
AST SERPL W P-5'-P-CCNC: 11 U/L (ref 0–45)
BASOPHILS # BLD AUTO: 0 10E3/UL (ref 0–0.2)
BASOPHILS NFR BLD AUTO: 1 %
BILIRUB SERPL-MCNC: 0.3 MG/DL
BUN SERPL-MCNC: 9.8 MG/DL (ref 8–23)
CALCIUM SERPL-MCNC: 8.3 MG/DL (ref 8.8–10.4)
CHLORIDE SERPL-SCNC: 106 MMOL/L (ref 98–107)
CREAT SERPL-MCNC: 0.76 MG/DL (ref 0.51–0.95)
EGFRCR SERPLBLD CKD-EPI 2021: 83 ML/MIN/1.73M2
EOSINOPHIL # BLD AUTO: 0 10E3/UL (ref 0–0.7)
EOSINOPHIL NFR BLD AUTO: 0 %
ERYTHROCYTE [DISTWIDTH] IN BLOOD BY AUTOMATED COUNT: 25.1 % (ref 10–15)
GLUCOSE SERPL-MCNC: 93 MG/DL (ref 70–99)
HCO3 SERPL-SCNC: 25 MMOL/L (ref 22–29)
HCT VFR BLD AUTO: 27.9 % (ref 35–47)
HGB BLD-MCNC: 8.8 G/DL (ref 11.7–15.7)
IMM GRANULOCYTES # BLD: 0 10E3/UL
IMM GRANULOCYTES NFR BLD: 0 %
LYMPHOCYTES # BLD AUTO: 1.6 10E3/UL (ref 0.8–5.3)
LYMPHOCYTES NFR BLD AUTO: 32 %
MAGNESIUM SERPL-MCNC: 1.7 MG/DL (ref 1.7–2.3)
MCH RBC QN AUTO: 27 PG (ref 26.5–33)
MCHC RBC AUTO-ENTMCNC: 31.5 G/DL (ref 31.5–36.5)
MCV RBC AUTO: 86 FL (ref 78–100)
MONOCYTES # BLD AUTO: 0.6 10E3/UL (ref 0–1.3)
MONOCYTES NFR BLD AUTO: 11 %
NEUTROPHILS # BLD AUTO: 2.9 10E3/UL (ref 1.6–8.3)
NEUTROPHILS NFR BLD AUTO: 56 %
NRBC # BLD AUTO: 0 10E3/UL
NRBC BLD AUTO-RTO: 0 /100
PLATELET # BLD AUTO: 263 10E3/UL (ref 150–450)
POTASSIUM SERPL-SCNC: 3.8 MMOL/L (ref 3.4–5.3)
PROT SERPL-MCNC: 5 G/DL (ref 6.4–8.3)
RBC # BLD AUTO: 3.26 10E6/UL (ref 3.8–5.2)
SODIUM SERPL-SCNC: 138 MMOL/L (ref 135–145)
WBC # BLD AUTO: 5.1 10E3/UL (ref 4–11)

## 2024-09-09 PROCEDURE — 250N000011 HC RX IP 250 OP 636: Performed by: NURSE PRACTITIONER

## 2024-09-09 PROCEDURE — 36591 DRAW BLOOD OFF VENOUS DEVICE: CPT | Performed by: NURSE PRACTITIONER

## 2024-09-09 PROCEDURE — 258N000003 HC RX IP 258 OP 636: Performed by: OBSTETRICS & GYNECOLOGY

## 2024-09-09 PROCEDURE — 258N000003 HC RX IP 258 OP 636: Performed by: NURSE PRACTITIONER

## 2024-09-09 PROCEDURE — 250N000011 HC RX IP 250 OP 636: Performed by: OBSTETRICS & GYNECOLOGY

## 2024-09-09 PROCEDURE — 99207 PR NO CHARGE LOS: CPT

## 2024-09-09 PROCEDURE — 96417 CHEMO IV INFUS EACH ADDL SEQ: CPT

## 2024-09-09 PROCEDURE — 82374 ASSAY BLOOD CARBON DIOXIDE: CPT | Performed by: NURSE PRACTITIONER

## 2024-09-09 PROCEDURE — 85041 AUTOMATED RBC COUNT: CPT | Performed by: NURSE PRACTITIONER

## 2024-09-09 PROCEDURE — 96375 TX/PRO/DX INJ NEW DRUG ADDON: CPT

## 2024-09-09 PROCEDURE — 96415 CHEMO IV INFUSION ADDL HR: CPT

## 2024-09-09 PROCEDURE — 83735 ASSAY OF MAGNESIUM: CPT | Performed by: NURSE PRACTITIONER

## 2024-09-09 PROCEDURE — 84460 ALANINE AMINO (ALT) (SGPT): CPT | Performed by: NURSE PRACTITIONER

## 2024-09-09 PROCEDURE — 96367 TX/PROPH/DG ADDL SEQ IV INF: CPT

## 2024-09-09 PROCEDURE — 96413 CHEMO IV INFUSION 1 HR: CPT

## 2024-09-09 RX ORDER — HEPARIN SODIUM (PORCINE) LOCK FLUSH IV SOLN 100 UNIT/ML 100 UNIT/ML
5 SOLUTION INTRAVENOUS
Status: DISCONTINUED | OUTPATIENT
Start: 2024-09-09 | End: 2024-09-09 | Stop reason: HOSPADM

## 2024-09-09 RX ORDER — DEXAMETHASONE 4 MG/1
8 TABLET ORAL
Qty: 6 TABLET | Refills: 11 | Status: SHIPPED | OUTPATIENT
Start: 2024-09-09

## 2024-09-09 RX ORDER — PALONOSETRON 0.05 MG/ML
0.25 INJECTION, SOLUTION INTRAVENOUS ONCE
Status: COMPLETED | OUTPATIENT
Start: 2024-09-09 | End: 2024-09-09

## 2024-09-09 RX ORDER — HEPARIN SODIUM (PORCINE) LOCK FLUSH IV SOLN 100 UNIT/ML 100 UNIT/ML
5 SOLUTION INTRAVENOUS EVERY 8 HOURS
Status: DISCONTINUED | OUTPATIENT
Start: 2024-09-09 | End: 2024-09-09 | Stop reason: HOSPADM

## 2024-09-09 RX ADMIN — Medication 5 ML: at 07:47

## 2024-09-09 RX ADMIN — FOSAPREPITANT DIMEGLUMINE: 150 INJECTION, POWDER, LYOPHILIZED, FOR SOLUTION INTRAVENOUS at 09:11

## 2024-09-09 RX ADMIN — CARBOPLATIN 450 MG: 10 INJECTION, SOLUTION INTRAVENOUS at 12:42

## 2024-09-09 RX ADMIN — FAMOTIDINE 20 MG: 10 INJECTION, SOLUTION INTRAVENOUS at 08:46

## 2024-09-09 RX ADMIN — SODIUM CHLORIDE 250 ML: 9 INJECTION, SOLUTION INTRAVENOUS at 08:39

## 2024-09-09 RX ADMIN — PACLITAXEL 300 MG: 6 INJECTION, SOLUTION, CONCENTRATE INTRAVENOUS at 09:37

## 2024-09-09 RX ADMIN — DIPHENHYDRAMINE HYDROCHLORIDE 50 MG: 50 INJECTION, SOLUTION INTRAMUSCULAR; INTRAVENOUS at 08:55

## 2024-09-09 RX ADMIN — PALONOSETRON HYDROCHLORIDE 0.25 MG: 0.25 INJECTION INTRAVENOUS at 08:43

## 2024-09-09 RX ADMIN — Medication 5 ML: at 13:32

## 2024-09-09 NOTE — PROGRESS NOTES
"Patient's name and  were verified.  See Doc Flowsheet - IV assess for details.  IVAD accessed with 20G 3/4\" harrington gripper plus needle  blood return positive: YES  Site without redness, tenderness or swelling: YES  flushed with 10cc NS and 5cc 100u/ml heparin  Needle: left in place for treatment  Comments: Labs drawn.  Patient tolerated procedure without incident..  "

## 2024-09-09 NOTE — PROGRESS NOTES
"Infusion Nursing Note:  Alis Hartman presents today for C2 Taxol/Carbo.    Patient seen by provider today: No   present during visit today: Not Applicable.    Note: Patient  here for C2 today. Patient is delayed for this cycle as she had transportation issues last week.  Writer reached out to Do Mendiola NP to sign chemo orders once labs resulted.  Shared with Do that patient has been doing well overall, but has bilateral leg +2-+3 pitting edema and her right leg has the starting of some blisters on her rios. Asked Do to make sure that patient is seen in person ahead of her next cycle rather than over video visit so she have her legs looked at.     Scheduling working on adjusting it.  Scheduling also working on adjusting her next appts as insurance is stating the 27th is too early for C3. Reviewed the importance of keeping appointments as scheduled to keep her chemotherapy on track. Also encouraged her to reach out to our  with any additional concerns that she may be able to help with.     Urine from her leg bag was yellow and clear but very odiferous. Patient states \"this is what it always smells like.\"     RN noted that patient did not have any additional decadron refills for her to post chemo. Melissa, Pharmacist sent Rx to  pharmacy and patient notified to pick them up tomorrow and take them as instructed as she did after the 1st cycle. Patient verbalized understanding.     Intravenous Access:  Implanted Port.    Treatment Conditions:  Lab Results   Component Value Date    HGB 8.8 (L) 09/09/2024    WBC 5.1 09/09/2024    ANEU 2.0 01/26/2015    ANEUTAUTO 2.9 09/09/2024     09/09/2024        Lab Results   Component Value Date     09/09/2024    POTASSIUM 3.8 09/09/2024    MAG 1.7 09/09/2024    CR 0.76 09/09/2024    SAMUEL 8.3 (L) 09/09/2024    BILITOTAL 0.3 09/09/2024    ALBUMIN 2.5 (L) 09/09/2024    ALT <5 09/09/2024    AST 11 09/09/2024       Results " reviewed, labs MET treatment parameters, ok to proceed with treatment.      Post Infusion Assessment:  Patient tolerated infusion without incident.  Blood return noted pre and post infusion.  Site patent and intact, free from redness, edema or discomfort.  No evidence of extravasations.  Access discontinued per protocol.       Discharge Plan:   Discharge instructions reviewed with: Patient and Family.  Patient and/or family verbalized understanding of discharge instructions and all questions answered.  Patient discharged in stable condition accompanied by: self and Nihien Schulz.  Departure Mode: Wheelchair.      Bronwyn Paz RN

## 2024-09-10 ENCOUNTER — MYC MEDICAL ADVICE (OUTPATIENT)
Dept: ONCOLOGY | Facility: CLINIC | Age: 69
End: 2024-09-10

## 2024-09-14 ENCOUNTER — HEALTH MAINTENANCE LETTER (OUTPATIENT)
Age: 69
End: 2024-09-14

## 2024-09-17 ENCOUNTER — TELEPHONE (OUTPATIENT)
Dept: ONCOLOGY | Facility: CLINIC | Age: 69
End: 2024-09-17
Payer: COMMERCIAL

## 2024-09-17 DIAGNOSIS — R30.0 DYSURIA: Primary | ICD-10-CM

## 2024-09-17 NOTE — TELEPHONE ENCOUNTER
----- Message from Nicholas JOHNSTON sent at 9/17/2024 10:56 AM CDT -----  Regarding: Symptom concerns  Aranza Garcia left a voicemail message this morning, stating she thinks she might have a bladder infection.  She did not leave any specific details regarding the symptoms she is having.    Please call her to assess - [phone].    She did say that if she ends up needing a prescription, she would prefer the [facility] in [place].    Thank you,  Nicholas

## 2024-09-17 NOTE — CONFIDENTIAL NOTE
S-(situation): c/o urinary burning and cloudy urine x4 days    B-(background): pt being seen for Carcinosarcoma of body of uterus,  most recent infusion 9/9 (C2 Taxol/Carbo)    A-(assessment): states that she has had bladder infection before and this feels very similar to this. She is having burning with urination and cloudy urine. States that the symptoms have not gotten better in the last 4 days. No symptoms of fever. No emergent symptoms per assessment.    R-(recommendations): Will enter UA per urinary symptom protocol. Pt to come to clinic today with daughter to provide urine sample.

## 2024-09-19 NOTE — TELEPHONE ENCOUNTER
Per chart review, pt never came in for lab appt for UA.  Attempted to reach pt to follow up today, but pt did not answer.  Left message for pt to return call.  Zohra Peterson RN on 9/19/2024 at 10:03 AM

## 2024-09-19 NOTE — TELEPHONE ENCOUNTER
Pt never went to clinic for lab appt for UA, and she has not returned follow up call.  Will route update to care team.  Zohra Peterson RN on 9/19/2024 at 4:30 PM

## 2024-09-20 ENCOUNTER — HOSPITAL ENCOUNTER (INPATIENT)
Facility: CLINIC | Age: 69
LOS: 6 days | Discharge: HOME | End: 2024-09-27
Attending: EMERGENCY MEDICINE | Admitting: HOSPITALIST
Payer: COMMERCIAL

## 2024-09-20 ENCOUNTER — APPOINTMENT (OUTPATIENT)
Dept: GENERAL RADIOLOGY | Facility: CLINIC | Age: 69
End: 2024-09-20
Attending: EMERGENCY MEDICINE
Payer: COMMERCIAL

## 2024-09-20 ENCOUNTER — APPOINTMENT (OUTPATIENT)
Dept: URGENT CARE | Facility: URGENT CARE | Age: 69
End: 2024-09-20
Payer: COMMERCIAL

## 2024-09-20 DIAGNOSIS — R78.81 BACTEREMIA DUE TO KLEBSIELLA PNEUMONIAE: ICD-10-CM

## 2024-09-20 DIAGNOSIS — C54.9 CARCINOSARCOMA OF BODY OF UTERUS (H): ICD-10-CM

## 2024-09-20 DIAGNOSIS — R06.01 ORTHOPNEA: ICD-10-CM

## 2024-09-20 DIAGNOSIS — R10.30 LOWER ABDOMINAL PAIN: ICD-10-CM

## 2024-09-20 DIAGNOSIS — C53.8 MALIGNANT NEOPLASM OF OVERLAPPING SITES OF CERVIX (H): ICD-10-CM

## 2024-09-20 DIAGNOSIS — N17.9 AKI (ACUTE KIDNEY INJURY) (H): ICD-10-CM

## 2024-09-20 DIAGNOSIS — T83.510A URINARY TRACT INFECTION ASSOCIATED WITH CYSTOSTOMY CATHETER, INITIAL ENCOUNTER (H): ICD-10-CM

## 2024-09-20 DIAGNOSIS — I48.0 PAROXYSMAL ATRIAL FIBRILLATION (H): ICD-10-CM

## 2024-09-20 DIAGNOSIS — M54.50 CHRONIC LOW BACK PAIN, UNSPECIFIED BACK PAIN LATERALITY, UNSPECIFIED WHETHER SCIATICA PRESENT: Primary | Chronic | ICD-10-CM

## 2024-09-20 DIAGNOSIS — R60.0 BILATERAL LOWER EXTREMITY EDEMA: ICD-10-CM

## 2024-09-20 DIAGNOSIS — G89.29 CHRONIC LOW BACK PAIN, UNSPECIFIED BACK PAIN LATERALITY, UNSPECIFIED WHETHER SCIATICA PRESENT: Primary | Chronic | ICD-10-CM

## 2024-09-20 DIAGNOSIS — B96.1 BACTEREMIA DUE TO KLEBSIELLA PNEUMONIAE: ICD-10-CM

## 2024-09-20 DIAGNOSIS — M54.50 CHRONIC MIDLINE LOW BACK PAIN WITHOUT SCIATICA: Chronic | ICD-10-CM

## 2024-09-20 DIAGNOSIS — N39.0 URINARY TRACT INFECTION ASSOCIATED WITH CYSTOSTOMY CATHETER, INITIAL ENCOUNTER (H): ICD-10-CM

## 2024-09-20 DIAGNOSIS — G89.29 CHRONIC MIDLINE LOW BACK PAIN WITHOUT SCIATICA: Chronic | ICD-10-CM

## 2024-09-20 DIAGNOSIS — E87.1 HYPONATREMIA: ICD-10-CM

## 2024-09-20 PROCEDURE — 99285 EMERGENCY DEPT VISIT HI MDM: CPT | Mod: 25 | Performed by: EMERGENCY MEDICINE

## 2024-09-20 PROCEDURE — 71046 X-RAY EXAM CHEST 2 VIEWS: CPT | Mod: 26 | Performed by: RADIOLOGY

## 2024-09-20 PROCEDURE — 71046 X-RAY EXAM CHEST 2 VIEWS: CPT

## 2024-09-20 PROCEDURE — 93005 ELECTROCARDIOGRAM TRACING: CPT | Performed by: EMERGENCY MEDICINE

## 2024-09-20 PROCEDURE — 93010 ELECTROCARDIOGRAM REPORT: CPT | Performed by: EMERGENCY MEDICINE

## 2024-09-20 PROCEDURE — 99285 EMERGENCY DEPT VISIT HI MDM: CPT | Performed by: EMERGENCY MEDICINE

## 2024-09-20 ASSESSMENT — COLUMBIA-SUICIDE SEVERITY RATING SCALE - C-SSRS
2. HAVE YOU ACTUALLY HAD ANY THOUGHTS OF KILLING YOURSELF IN THE PAST MONTH?: NO
6. HAVE YOU EVER DONE ANYTHING, STARTED TO DO ANYTHING, OR PREPARED TO DO ANYTHING TO END YOUR LIFE?: NO
1. IN THE PAST MONTH, HAVE YOU WISHED YOU WERE DEAD OR WISHED YOU COULD GO TO SLEEP AND NOT WAKE UP?: NO

## 2024-09-20 ASSESSMENT — ACTIVITIES OF DAILY LIVING (ADL): ADLS_ACUITY_SCORE: 38

## 2024-09-20 NOTE — LETTER
Transition Communication Hand-off for Care Transitions to Next Level of Care Provider    Name: Alis Hartman  : 1955  MRN #: [id number]  Primary Care Provider: Maria Fernanda Eckert     Primary Clinic: [address]  [place] [address]     Reason for Hospitalization:  Orthopnea [R06.01]  Hyponatremia [E87.1]  MADHU (acute kidney injury) (H) [N17.9]  Bilateral lower extremity edema [R60.0]  Urinary tract infection associated with cystostomy catheter, initial encounter (H) [T83.510A, N39.0]  Admit Date/Time: 2024 10:29 PM  Discharge Date:   Payor Source: Payor: [facility] / Plan: [facility] DUAL / Product Type: HMO /          Reason for Communication Hand-off Referral: Other complex discharge     Discharge Plan:  Discharged to home with family assist, Home infusion  Resumption of preexisting community services      Concern for non-adherence with plan of care:   Yes - TCU initially recommended, patient declined. Care coordination set up additional DME/services.      Discharge Needs Assessment:  Needs      Flowsheet Row Most Recent Value   Equipment Currently Used at Home commode chair, shower chair, walker, rolling   # of Referrals Placed by CM Internal Clinic Care Coordination, Homecare, Durable Medical Equipment (DME)          Future Appointments   Date Time Provider Department Center   2024 10:00 AM Anita Arrington, PT [facility]   10/3/2024 11:30 AM Marcia Painting APRN CNP [facility]   10/4/2024  8:00 AM MG CANCER FAST TRACK LAB [facility] [place]   10/4/2024  8:30 AM MG BAY 9 INFUSION [facility] [place]   10/23/2024  3:45 PM Marcia Painting APRN CNP [facility]   10/25/2024 10:00 AM  [facility] LAB DRAW ON[facility]   10/25/2024 10:30 AM  ONC INFUSION NURSE SIDNEY [facility]   2024  9:45 AM Marcia Painting APRN CNP [facility]   11/15/2024  8:30 AM [facility] CANCER FAST TRACK LAB [facility] [place]   11/15/2024  9:00 AM MG BAY 9 INFUSION [facility] [place]   2024  1:45 PM  "Marcia Painting, ERINN CNP [facility]   12/6/2024  8:30 AM MG CANCER FAST TRACK LAB [facility]   12/6/2024  9:00 AM MG BAY 7 INFUSION [facility]   12/17/2024  8:30 AM MG CANCER FAST TRACK LAB [facility]   12/17/2024  9:00 AM Dany Perez MD [facility]       Any outstanding tests or procedures:        Referrals       Future Labs/Procedures    Primary Care - Care Coordination Referral     Process Instructions:    Services are provided by a Care Coordinator for people with complex needs such as: medical, social, or financial troubles.  The Care Coordinator works with the patient and their Primary Care Provider to determine health goals, obtain resources, achieve outcomes, and develop care plans that help coordinate the patient's care.     Home Infusion Referral     Comments:    IV ab            Supplies       Future Labs/Procedures    Hospital Bed Order for DME - ONLY FOR DME     Comments:    Hospital Bed Documentation:   Hospital bed is required for body positioning, to allow for safe transfers to wheelchair and standing and frequent changes in body position, not feasible in an ordinary bed     NOTE: Patient must have a \"Yes\" in one of the four following questions to qualify for a hospital bed.    1. Does the patient require positioning of the body in ways not feasible with an ordinary bed due to a medical condition that is expected to last at least 1 month? Yes (Please explain): chronic pain, orthopnea, malignancy    2. Does the patient require, for the alleviation of pain, positioning of the body in ways not feasible with an ordinary bed? Yes (Please explain): chronic pain, orthopnea, malignancy     3. Does the patient require the head of the bed to be elevated more than 30 degrees most of the time due to congestive heart failure, chronic pulmonary disease, or aspiration? No    4. Does the patient require traction that can only be attached to a hospital bed? No    Additional " Criteria:    Does the patient require frequent changes in body position and/or have an immediate need for change in body position? Yes - Patient qualifies for Semi Electric Bed     Trapeze Criteria:  (Patient must meet standard hospital bed criteria also)   1. Does patient need this device to sit up because of a respiratory condition, for change in body position for other medical reasons, or to get in or out of bed? No    I, the undersigned, certify that the above prescribed supplies are medically necessary for this patient and is both reasonable and necessary in reference to accepted standards of medical and necessary in reference to accepted standards of medical practice in the treatment of this patient's condition and is not prescribed as a convenience.    Wheelchair Order             Key Recommendations:      LUÍS Quigley    AVS/Discharge Summary is the source of truth; this is a helpful guide for improved communication of patient story

## 2024-09-21 ENCOUNTER — APPOINTMENT (OUTPATIENT)
Dept: ULTRASOUND IMAGING | Facility: CLINIC | Age: 69
End: 2024-09-21
Attending: EMERGENCY MEDICINE
Payer: COMMERCIAL

## 2024-09-21 PROBLEM — E87.1 HYPONATREMIA: Status: ACTIVE | Noted: 2024-09-21

## 2024-09-21 PROBLEM — T83.510A URINARY TRACT INFECTION ASSOCIATED WITH CYSTOSTOMY CATHETER, INITIAL ENCOUNTER (H): Status: ACTIVE | Noted: 2024-09-21

## 2024-09-21 PROBLEM — R60.0 BILATERAL LOWER EXTREMITY EDEMA: Status: ACTIVE | Noted: 2024-09-21

## 2024-09-21 PROBLEM — R06.01 ORTHOPNEA: Status: ACTIVE | Noted: 2024-09-21

## 2024-09-21 PROBLEM — N39.0 URINARY TRACT INFECTION ASSOCIATED WITH CYSTOSTOMY CATHETER, INITIAL ENCOUNTER (H): Status: ACTIVE | Noted: 2024-09-21

## 2024-09-21 LAB
ACANTHOCYTES BLD QL SMEAR: SLIGHT
ACINETOBACTER SPECIES: NOT DETECTED
ALBUMIN SERPL BCG-MCNC: 2.4 G/DL (ref 3.5–5.2)
ALBUMIN SERPL BCG-MCNC: 2.7 G/DL (ref 3.5–5.2)
ALBUMIN UR-MCNC: 300 MG/DL
ALP SERPL-CCNC: 80 U/L (ref 40–150)
ALP SERPL-CCNC: 84 U/L (ref 40–150)
ALT SERPL W P-5'-P-CCNC: 7 U/L (ref 0–50)
ALT SERPL W P-5'-P-CCNC: 8 U/L (ref 0–50)
AMORPH CRY #/AREA URNS HPF: ABNORMAL /HPF
ANION GAP SERPL CALCULATED.3IONS-SCNC: 15 MMOL/L (ref 7–15)
ANION GAP SERPL CALCULATED.3IONS-SCNC: 18 MMOL/L (ref 7–15)
APPEARANCE UR: ABNORMAL
APTT PPP: 26 SECONDS (ref 22–38)
AST SERPL W P-5'-P-CCNC: 16 U/L (ref 0–45)
AST SERPL W P-5'-P-CCNC: 9 U/L (ref 0–45)
ATRIAL RATE - MUSE: 128 BPM
BACTERIA #/AREA URNS HPF: ABNORMAL /HPF
BASOPHILS # BLD AUTO: 0 10E3/UL (ref 0–0.2)
BASOPHILS NFR BLD AUTO: 0 %
BILIRUB SERPL-MCNC: 0.4 MG/DL
BILIRUB SERPL-MCNC: 0.5 MG/DL
BILIRUB UR QL STRIP: NEGATIVE
BUN SERPL-MCNC: 21 MG/DL (ref 8–23)
BUN SERPL-MCNC: 22.9 MG/DL (ref 8–23)
CA CARBONATE CRY #/AREA URNS HPF: ABNORMAL /HPF
CA PHOS CRY #/AREA URNS HPF: ABNORMAL /HPF
CALCIUM SERPL-MCNC: 8 MG/DL (ref 8.8–10.4)
CALCIUM SERPL-MCNC: 8 MG/DL (ref 8.8–10.4)
CAOX CRY #/AREA URNS HPF: ABNORMAL /HPF
CELLULAR CAST: 0 /LPF
CHLORIDE SERPL-SCNC: 91 MMOL/L (ref 98–107)
CHLORIDE SERPL-SCNC: 92 MMOL/L (ref 98–107)
CHOLEST SERPL-MCNC: 96 MG/DL
CITROBACTER SPECIES: NOT DETECTED
COLOR UR AUTO: ABNORMAL
CORTIS SERPL-MCNC: 33.4 UG/DL
CREAT SERPL-MCNC: 2.1 MG/DL (ref 0.51–0.95)
CREAT SERPL-MCNC: 2.14 MG/DL (ref 0.51–0.95)
CREAT UR-MCNC: 19.3 MG/DL
CRYSTALS #/AREA URNS HPF: ABNORMAL /HPF
CTX-M: DETECTED
DIASTOLIC BLOOD PRESSURE - MUSE: NORMAL MMHG
EGFRCR SERPLBLD CKD-EPI 2021: 24 ML/MIN/1.73M2
EGFRCR SERPLBLD CKD-EPI 2021: 25 ML/MIN/1.73M2
ENTEROBACTER SPECIES: NOT DETECTED
EOSINOPHIL # BLD AUTO: 0 10E3/UL (ref 0–0.7)
EOSINOPHIL NFR BLD AUTO: 0 %
EPITHELIAL CASTS: 0 /LPF
ERYTHROCYTE [DISTWIDTH] IN BLOOD BY AUTOMATED COUNT: 21 % (ref 10–15)
ERYTHROCYTE [DISTWIDTH] IN BLOOD BY AUTOMATED COUNT: 21.1 % (ref 10–15)
ESCHERICHIA COLI: NOT DETECTED
EST. AVERAGE GLUCOSE BLD GHB EST-MCNC: 97 MG/DL
FATTY CAST: 0 /LPF
GLUCOSE SERPL-MCNC: 100 MG/DL (ref 70–99)
GLUCOSE SERPL-MCNC: 85 MG/DL (ref 70–99)
GLUCOSE UR STRIP-MCNC: NEGATIVE MG/DL
GRANULAR CAST: 0 /LPF
HBA1C MFR BLD: 5 %
HCO3 SERPL-SCNC: 16 MMOL/L (ref 22–29)
HCO3 SERPL-SCNC: 18 MMOL/L (ref 22–29)
HCT VFR BLD AUTO: 28.5 % (ref 35–47)
HCT VFR BLD AUTO: 29.8 % (ref 35–47)
HDLC SERPL-MCNC: 30 MG/DL
HGB BLD-MCNC: 9.1 G/DL (ref 11.7–15.7)
HGB BLD-MCNC: 9.6 G/DL (ref 11.7–15.7)
HGB UR QL STRIP: ABNORMAL
HIV 1+2 AB+HIV1 P24 AG SERPL QL IA: NONREACTIVE
HOLD SPECIMEN: NORMAL
HOLD SPECIMEN: NORMAL
HYALINE CASTS: 0 /LPF
IMM GRANULOCYTES # BLD: 0.2 10E3/UL
IMM GRANULOCYTES NFR BLD: 3 %
IMP: NOT DETECTED
INR PPP: 0.92 (ref 0.85–1.15)
INTERPRETATION ECG - MUSE: NORMAL
KETONES UR STRIP-MCNC: 5 MG/DL
KLEBSIELLA OXYTOCA: NOT DETECTED
KLEBSIELLA PNEUMONIAE: DETECTED
KPC: NOT DETECTED
LACTATE SERPL-SCNC: 1.1 MMOL/L (ref 0.7–2)
LDLC SERPL CALC-MCNC: 44 MG/DL
LEUCINE CRYSTALS: ABNORMAL /HPF
LEUKOCYTE ESTERASE UR QL STRIP: ABNORMAL
LYMPHOCYTES # BLD AUTO: 0.7 10E3/UL (ref 0.8–5.3)
LYMPHOCYTES NFR BLD AUTO: 11 %
MAGNESIUM SERPL-MCNC: 1.1 MG/DL (ref 1.7–2.3)
MCH RBC QN AUTO: 27.9 PG (ref 26.5–33)
MCH RBC QN AUTO: 28.3 PG (ref 26.5–33)
MCHC RBC AUTO-ENTMCNC: 31.9 G/DL (ref 31.5–36.5)
MCHC RBC AUTO-ENTMCNC: 32.2 G/DL (ref 31.5–36.5)
MCV RBC AUTO: 87 FL (ref 78–100)
MCV RBC AUTO: 89 FL (ref 78–100)
MONOCYTES # BLD AUTO: 0.9 10E3/UL (ref 0–1.3)
MONOCYTES NFR BLD AUTO: 14 %
MUCOUS THREADS #/AREA URNS LPF: ABNORMAL /LPF
NDM: NOT DETECTED
NEUTROPHILS # BLD AUTO: 4.6 10E3/UL (ref 1.6–8.3)
NEUTROPHILS NFR BLD AUTO: 72 %
NITRATE UR QL: NEGATIVE
NONHDLC SERPL-MCNC: 66 MG/DL
NRBC # BLD AUTO: 0 10E3/UL
NRBC BLD AUTO-RTO: 0 /100
NT-PROBNP SERPL-MCNC: 2025 PG/ML (ref 0–900)
OSMOLALITY SERPL: 266 MMOL/KG (ref 280–301)
OSMOLALITY UR: 333 MMOL/KG (ref 100–1200)
OVAL FAT BODIES #/AREA URNS HPF: ABNORMAL /HPF
OXA (DETECTED/NOT DETECTED): NOT DETECTED
P AXIS - MUSE: 52 DEGREES
PH UR STRIP: 8 [PH] (ref 5–7)
PHOSPHATE SERPL-MCNC: 3 MG/DL (ref 2.5–4.5)
PLAT MORPH BLD: ABNORMAL
PLATELET # BLD AUTO: 48 10E3/UL (ref 150–450)
PLATELET # BLD AUTO: 58 10E3/UL (ref 150–450)
POTASSIUM SERPL-SCNC: 3 MMOL/L (ref 3.4–5.3)
POTASSIUM SERPL-SCNC: 3.2 MMOL/L (ref 3.4–5.3)
PR INTERVAL - MUSE: 140 MS
PROCALCITONIN SERPL IA-MCNC: 5.55 NG/ML
PROT SERPL-MCNC: 5.6 G/DL (ref 6.4–8.3)
PROT SERPL-MCNC: 5.7 G/DL (ref 6.4–8.3)
PROTEUS SPECIES: NOT DETECTED
PSEUDOMONAS AERUGINOSA: NOT DETECTED
QRS DURATION - MUSE: 68 MS
QT - MUSE: 314 MS
QTC - MUSE: 458 MS
R AXIS - MUSE: -11 DEGREES
RBC # BLD AUTO: 3.22 10E6/UL (ref 3.8–5.2)
RBC # BLD AUTO: 3.44 10E6/UL (ref 3.8–5.2)
RBC CAST: 0 /LPF
RBC CLUMPS #/AREA URNS HPF: ABNORMAL /HPF
RBC MORPH BLD: ABNORMAL
RBC URINE: >100 /HPF
RENAL TUB EPI: 0 /HPF
RENAL TUB FAT CAST: 0 /LPF
SODIUM SERPL-SCNC: 125 MMOL/L (ref 135–145)
SODIUM SERPL-SCNC: 125 MMOL/L (ref 135–145)
SODIUM SERPL-SCNC: 126 MMOL/L (ref 135–145)
SODIUM UR-SCNC: 103 MMOL/L
SP GR UR STRIP: 1.01 (ref 1–1.03)
SPERM #/AREA URNS HPF: ABNORMAL /HPF
SQUAMOUS EPITHELIAL: 5 /HPF
SYSTOLIC BLOOD PRESSURE - MUSE: NORMAL MMHG
T AXIS - MUSE: 64 DEGREES
TRANSITIONAL EPI: 0 /HPF
TRI-PHOS CRY #/AREA URNS HPF: ABNORMAL /HPF
TRICHOMONAS #/AREA URNS HPF: ABNORMAL /HPF
TRIGL SERPL-MCNC: 108 MG/DL
TSH SERPL DL<=0.005 MIU/L-ACNC: 2 UIU/ML (ref 0.3–4.2)
TYROSINE CRYSTALS: ABNORMAL /HPF
URATE CRY #/AREA URNS HPF: ABNORMAL /HPF
UROBILINOGEN UR STRIP-MCNC: NORMAL MG/DL
VENTRICULAR RATE- MUSE: 128 BPM
VIM: NOT DETECTED
WAXY CAST: 0 /LPF
WBC # BLD AUTO: 5.7 10E3/UL (ref 4–11)
WBC # BLD AUTO: 6.4 10E3/UL (ref 4–11)
WBC CAST: 0 /LPF
WBC CLUMPS #/AREA URNS HPF: PRESENT /HPF
WBC URINE: >100 /HPF
YEAST #/AREA URNS HPF: ABNORMAL /HPF
YEAST #/AREA URNS HPF: ABNORMAL /HPF

## 2024-09-21 PROCEDURE — 81001 URINALYSIS AUTO W/SCOPE: CPT | Performed by: EMERGENCY MEDICINE

## 2024-09-21 PROCEDURE — 86160 COMPLEMENT ANTIGEN: CPT | Performed by: STUDENT IN AN ORGANIZED HEALTH CARE EDUCATION/TRAINING PROGRAM

## 2024-09-21 PROCEDURE — 83880 ASSAY OF NATRIURETIC PEPTIDE: CPT | Performed by: EMERGENCY MEDICINE

## 2024-09-21 PROCEDURE — 85730 THROMBOPLASTIN TIME PARTIAL: CPT | Performed by: STUDENT IN AN ORGANIZED HEALTH CARE EDUCATION/TRAINING PROGRAM

## 2024-09-21 PROCEDURE — 250N000013 HC RX MED GY IP 250 OP 250 PS 637

## 2024-09-21 PROCEDURE — 84156 ASSAY OF PROTEIN URINE: CPT | Performed by: STUDENT IN AN ORGANIZED HEALTH CARE EDUCATION/TRAINING PROGRAM

## 2024-09-21 PROCEDURE — 36415 COLL VENOUS BLD VENIPUNCTURE: CPT

## 2024-09-21 PROCEDURE — 83930 ASSAY OF BLOOD OSMOLALITY: CPT | Performed by: EMERGENCY MEDICINE

## 2024-09-21 PROCEDURE — 84155 ASSAY OF PROTEIN SERUM: CPT | Performed by: STUDENT IN AN ORGANIZED HEALTH CARE EDUCATION/TRAINING PROGRAM

## 2024-09-21 PROCEDURE — 84295 ASSAY OF SERUM SODIUM: CPT | Performed by: STUDENT IN AN ORGANIZED HEALTH CARE EDUCATION/TRAINING PROGRAM

## 2024-09-21 PROCEDURE — 76770 US EXAM ABDO BACK WALL COMP: CPT

## 2024-09-21 PROCEDURE — 87389 HIV-1 AG W/HIV-1&-2 AB AG IA: CPT | Performed by: STUDENT IN AN ORGANIZED HEALTH CARE EDUCATION/TRAINING PROGRAM

## 2024-09-21 PROCEDURE — 84100 ASSAY OF PHOSPHORUS: CPT | Performed by: EMERGENCY MEDICINE

## 2024-09-21 PROCEDURE — 250N000011 HC RX IP 250 OP 636: Performed by: STUDENT IN AN ORGANIZED HEALTH CARE EDUCATION/TRAINING PROGRAM

## 2024-09-21 PROCEDURE — 84165 PROTEIN E-PHORESIS SERUM: CPT | Mod: 26 | Performed by: PATHOLOGY

## 2024-09-21 PROCEDURE — 250N000013 HC RX MED GY IP 250 OP 250 PS 637: Performed by: STUDENT IN AN ORGANIZED HEALTH CARE EDUCATION/TRAINING PROGRAM

## 2024-09-21 PROCEDURE — 84443 ASSAY THYROID STIM HORMONE: CPT | Performed by: EMERGENCY MEDICINE

## 2024-09-21 PROCEDURE — 82533 TOTAL CORTISOL: CPT | Performed by: STUDENT IN AN ORGANIZED HEALTH CARE EDUCATION/TRAINING PROGRAM

## 2024-09-21 PROCEDURE — 82570 ASSAY OF URINE CREATININE: CPT | Performed by: STUDENT IN AN ORGANIZED HEALTH CARE EDUCATION/TRAINING PROGRAM

## 2024-09-21 PROCEDURE — 83605 ASSAY OF LACTIC ACID: CPT | Performed by: EMERGENCY MEDICINE

## 2024-09-21 PROCEDURE — 85610 PROTHROMBIN TIME: CPT | Performed by: STUDENT IN AN ORGANIZED HEALTH CARE EDUCATION/TRAINING PROGRAM

## 2024-09-21 PROCEDURE — 86334 IMMUNOFIX E-PHORESIS SERUM: CPT | Performed by: PATHOLOGY

## 2024-09-21 PROCEDURE — 83036 HEMOGLOBIN GLYCOSYLATED A1C: CPT | Performed by: STUDENT IN AN ORGANIZED HEALTH CARE EDUCATION/TRAINING PROGRAM

## 2024-09-21 PROCEDURE — 85025 COMPLETE CBC W/AUTO DIFF WBC: CPT | Performed by: EMERGENCY MEDICINE

## 2024-09-21 PROCEDURE — 83735 ASSAY OF MAGNESIUM: CPT | Performed by: EMERGENCY MEDICINE

## 2024-09-21 PROCEDURE — 84165 PROTEIN E-PHORESIS SERUM: CPT | Mod: TC | Performed by: PATHOLOGY

## 2024-09-21 PROCEDURE — 120N000002 HC R&B MED SURG/OB UMMC

## 2024-09-21 PROCEDURE — 85027 COMPLETE CBC AUTOMATED: CPT | Performed by: STUDENT IN AN ORGANIZED HEALTH CARE EDUCATION/TRAINING PROGRAM

## 2024-09-21 PROCEDURE — 86334 IMMUNOFIX E-PHORESIS SERUM: CPT | Mod: 26 | Performed by: PATHOLOGY

## 2024-09-21 PROCEDURE — 82040 ASSAY OF SERUM ALBUMIN: CPT | Performed by: EMERGENCY MEDICINE

## 2024-09-21 PROCEDURE — 86038 ANTINUCLEAR ANTIBODIES: CPT | Performed by: STUDENT IN AN ORGANIZED HEALTH CARE EDUCATION/TRAINING PROGRAM

## 2024-09-21 PROCEDURE — 87040 BLOOD CULTURE FOR BACTERIA: CPT | Performed by: EMERGENCY MEDICINE

## 2024-09-21 PROCEDURE — 84300 ASSAY OF URINE SODIUM: CPT | Performed by: EMERGENCY MEDICINE

## 2024-09-21 PROCEDURE — 87149 DNA/RNA DIRECT PROBE: CPT | Performed by: EMERGENCY MEDICINE

## 2024-09-21 PROCEDURE — 99223 1ST HOSP IP/OBS HIGH 75: CPT | Mod: AI | Performed by: STUDENT IN AN ORGANIZED HEALTH CARE EDUCATION/TRAINING PROGRAM

## 2024-09-21 PROCEDURE — 76770 US EXAM ABDO BACK WALL COMP: CPT | Mod: 26 | Performed by: RADIOLOGY

## 2024-09-21 PROCEDURE — 250N000013 HC RX MED GY IP 250 OP 250 PS 637: Performed by: EMERGENCY MEDICINE

## 2024-09-21 PROCEDURE — 36415 COLL VENOUS BLD VENIPUNCTURE: CPT | Performed by: EMERGENCY MEDICINE

## 2024-09-21 PROCEDURE — 87077 CULTURE AEROBIC IDENTIFY: CPT | Performed by: EMERGENCY MEDICINE

## 2024-09-21 PROCEDURE — 87086 URINE CULTURE/COLONY COUNT: CPT | Performed by: EMERGENCY MEDICINE

## 2024-09-21 PROCEDURE — 84145 PROCALCITONIN (PCT): CPT | Performed by: STUDENT IN AN ORGANIZED HEALTH CARE EDUCATION/TRAINING PROGRAM

## 2024-09-21 PROCEDURE — 36591 DRAW BLOOD OFF VENOUS DEVICE: CPT | Performed by: EMERGENCY MEDICINE

## 2024-09-21 PROCEDURE — 80061 LIPID PANEL: CPT | Performed by: STUDENT IN AN ORGANIZED HEALTH CARE EDUCATION/TRAINING PROGRAM

## 2024-09-21 PROCEDURE — 250N000011 HC RX IP 250 OP 636: Performed by: EMERGENCY MEDICINE

## 2024-09-21 PROCEDURE — 83521 IG LIGHT CHAINS FREE EACH: CPT | Performed by: STUDENT IN AN ORGANIZED HEALTH CARE EDUCATION/TRAINING PROGRAM

## 2024-09-21 PROCEDURE — 83935 ASSAY OF URINE OSMOLALITY: CPT | Performed by: EMERGENCY MEDICINE

## 2024-09-21 RX ORDER — MAGNESIUM SULFATE HEPTAHYDRATE 40 MG/ML
2 INJECTION, SOLUTION INTRAVENOUS ONCE
Status: COMPLETED | OUTPATIENT
Start: 2024-09-21 | End: 2024-09-21

## 2024-09-21 RX ORDER — MAGNESIUM OXIDE 400 MG/1
400 TABLET ORAL ONCE
Status: COMPLETED | OUTPATIENT
Start: 2024-09-21 | End: 2024-09-21

## 2024-09-21 RX ORDER — POTASSIUM CHLORIDE 29.8 MG/ML
20 INJECTION INTRAVENOUS ONCE
Status: COMPLETED | OUTPATIENT
Start: 2024-09-21 | End: 2024-09-22

## 2024-09-21 RX ORDER — ALBUTEROL SULFATE 90 UG/1
1-2 AEROSOL, METERED RESPIRATORY (INHALATION) EVERY 4 HOURS PRN
Status: CANCELLED | OUTPATIENT
Start: 2024-09-21

## 2024-09-21 RX ORDER — ACETAMINOPHEN 325 MG/1
650 TABLET ORAL EVERY 4 HOURS PRN
Status: DISCONTINUED | OUTPATIENT
Start: 2024-09-21 | End: 2024-09-27 | Stop reason: HOSPADM

## 2024-09-21 RX ORDER — CEPHALEXIN 500 MG/1
500 CAPSULE ORAL 3 TIMES DAILY
Qty: 21 CAPSULE | Refills: 0 | Status: SHIPPED | OUTPATIENT
Start: 2024-09-21 | End: 2024-09-27

## 2024-09-21 RX ORDER — CEFTRIAXONE 2 G/1
2 INJECTION, POWDER, FOR SOLUTION INTRAMUSCULAR; INTRAVENOUS EVERY 24 HOURS
Status: DISCONTINUED | OUTPATIENT
Start: 2024-09-21 | End: 2024-09-21

## 2024-09-21 RX ORDER — POLYETHYLENE GLYCOL 3350 17 G/17G
17 POWDER, FOR SOLUTION ORAL 2 TIMES DAILY PRN
Status: DISCONTINUED | OUTPATIENT
Start: 2024-09-21 | End: 2024-09-27 | Stop reason: HOSPADM

## 2024-09-21 RX ORDER — CEFEPIME HYDROCHLORIDE 2 G/1
2 INJECTION, POWDER, FOR SOLUTION INTRAVENOUS EVERY 8 HOURS
Status: DISCONTINUED | OUTPATIENT
Start: 2024-09-21 | End: 2024-09-21

## 2024-09-21 RX ORDER — AMOXICILLIN 250 MG
2 CAPSULE ORAL 2 TIMES DAILY PRN
Status: DISCONTINUED | OUTPATIENT
Start: 2024-09-21 | End: 2024-09-27 | Stop reason: HOSPADM

## 2024-09-21 RX ORDER — ACETAMINOPHEN 650 MG/1
650 SUPPOSITORY RECTAL EVERY 4 HOURS PRN
Status: DISCONTINUED | OUTPATIENT
Start: 2024-09-21 | End: 2024-09-27 | Stop reason: HOSPADM

## 2024-09-21 RX ORDER — POTASSIUM CHLORIDE 20MEQ/15ML
40 LIQUID (ML) ORAL ONCE
Status: COMPLETED | OUTPATIENT
Start: 2024-09-21 | End: 2024-09-21

## 2024-09-21 RX ORDER — AMOXICILLIN 250 MG
1 CAPSULE ORAL 2 TIMES DAILY PRN
Status: DISCONTINUED | OUTPATIENT
Start: 2024-09-21 | End: 2024-09-27 | Stop reason: HOSPADM

## 2024-09-21 RX ORDER — MEROPENEM 500 MG/1
500 INJECTION, POWDER, FOR SOLUTION INTRAVENOUS EVERY 12 HOURS
Status: DISCONTINUED | OUTPATIENT
Start: 2024-09-21 | End: 2024-09-23

## 2024-09-21 RX ORDER — CALCIUM CARBONATE 500 MG/1
1000 TABLET, CHEWABLE ORAL 4 TIMES DAILY PRN
Status: DISCONTINUED | OUTPATIENT
Start: 2024-09-21 | End: 2024-09-27 | Stop reason: HOSPADM

## 2024-09-21 RX ORDER — CEFTRIAXONE 1 G/1
1 INJECTION, POWDER, FOR SOLUTION INTRAMUSCULAR; INTRAVENOUS ONCE
Status: COMPLETED | OUTPATIENT
Start: 2024-09-21 | End: 2024-09-21

## 2024-09-21 RX ORDER — LIDOCAINE 40 MG/G
CREAM TOPICAL
Status: DISCONTINUED | OUTPATIENT
Start: 2024-09-21 | End: 2024-09-27 | Stop reason: HOSPADM

## 2024-09-21 RX ADMIN — POTASSIUM CHLORIDE 20 MEQ: 29.8 INJECTION, SOLUTION INTRAVENOUS at 23:54

## 2024-09-21 RX ADMIN — MAGNESIUM SULFATE HEPTAHYDRATE 2 G: 2 INJECTION, SOLUTION INTRAVENOUS at 22:12

## 2024-09-21 RX ADMIN — POTASSIUM BICARBONATE 50 MEQ: 977.5 TABLET, EFFERVESCENT ORAL at 22:00

## 2024-09-21 RX ADMIN — ACETAMINOPHEN 650 MG: 325 TABLET ORAL at 22:12

## 2024-09-21 RX ADMIN — MEROPENEM 500 MG: 500 INJECTION, POWDER, FOR SOLUTION INTRAVENOUS at 19:12

## 2024-09-21 RX ADMIN — POTASSIUM CHLORIDE 40 MEQ: 40 SOLUTION ORAL at 04:21

## 2024-09-21 RX ADMIN — CEFTRIAXONE SODIUM 1 G: 1 INJECTION, POWDER, FOR SOLUTION INTRAMUSCULAR; INTRAVENOUS at 06:40

## 2024-09-21 RX ADMIN — MAGNESIUM OXIDE TAB 400 MG (241.3 MG ELEMENTAL MG) 400 MG: 400 (241.3 MG) TAB at 04:21

## 2024-09-21 ASSESSMENT — ACTIVITIES OF DAILY LIVING (ADL)
ADLS_ACUITY_SCORE: 30
ADLS_ACUITY_SCORE: 38
ADLS_ACUITY_SCORE: 30
ADLS_ACUITY_SCORE: 39
ADLS_ACUITY_SCORE: 30
ADLS_ACUITY_SCORE: 38
ADLS_ACUITY_SCORE: 39
ADLS_ACUITY_SCORE: 30
ADLS_ACUITY_SCORE: 39
ADLS_ACUITY_SCORE: 38
ADLS_ACUITY_SCORE: 38
ADLS_ACUITY_SCORE: 30
ADLS_ACUITY_SCORE: 38
ADLS_ACUITY_SCORE: 30
ADLS_ACUITY_SCORE: 39
ADLS_ACUITY_SCORE: 39
ADLS_ACUITY_SCORE: 30
ADLS_ACUITY_SCORE: 39

## 2024-09-21 NOTE — PROGRESS NOTES
Patient handoff report given to Dion CRAMER, Patient is going into 5A room 5223    BP (!) 142/97 (BP Location: Right arm)   Pulse 92   Temp 97.2  F (36.2  C) (Oral)   Resp 18   SpO2 100%

## 2024-09-21 NOTE — PROVIDER NOTIFICATION
Paged Gold 9    RE: Patient is requesting pain medication. States normally takes oxycodone at home. Also, can the patient get a diet order placed if no procedure planned for today? thx

## 2024-09-21 NOTE — ED TRIAGE NOTES
Pt brought in by daughter to ED w/ C/O SOB, increases leg swelling,confusion, and cloudy .Daughter states pt has not had much sleep in the last 32 days. Symptoms started ~2 days ago and have progressively gotten worse. Pt has a suprapubic catheter.     Pt is A&Ox4 in triage but slow to respond at times    /73   Pulse 74   Temp 98  F (36.7  C)   Resp 16   SpO2 97%     Hx: uterine cancer (on chemo) , afib, htn,      Triage Assessment (Adult)       Row Name 09/20/24 1955          Triage Assessment    Airway WDL WDL        Respiratory WDL    Respiratory WDL X  SOB        Skin Circulation/Temperature WDL    Skin Circulation/Temperature WDL WDL        Cardiac WDL    Cardiac WDL WDL  pitting edema bilat LE        Peripheral/Neurovascular WDL    Peripheral Neurovascular WDL WDL        Cognitive/Neuro/Behavioral WDL    Cognitive/Neuro/Behavioral WDL WDL

## 2024-09-21 NOTE — H&P
I saw this patient independently, reviewed images, ,labs, and discussed A&P with the patient and her daughter. Please refer to my note from today for further details.     Rocio Hwang MD      [facility]    History and Physical - Medicine Service, [facility] TEAM        Date of Admission:  9/20/2024    Assessment & Plan      Alis Hartman is a 69 year old female admitted on 9/20/2024. She presents with c/f shortness of breath, increased leg swelling, confusion.        #C/f urinary tract infection  #Chronic suprapubic catheter  # Serous uterine carcinoma, s/p SNEHA/BSO, EDIE and cystotomy repair with suprapubic bladder insertion and omental flap on 7/12/24  #History of REMA-III, s/p laser therapy  #History of cevical cancer, s/p radiation therapy  #History of REMA-III, s/p laser therapy  #History of cevical cancer, s/p radiation therapy  #h/o bladder resection  #sp hysterectomy  Appears suprapubic catheter in place in August 2024 at prior hospitalization. During that admission, completed planned PO antibiotic regimen for recent UTI, as well as superficial surgical site infection (Bactrim/Flagyl x7 days ending 7/31/2024; did receive Zosyn x1 in the ED). She received chemotherapy education and cycle #1 of carboplatin/paclitaxel on 8/9/24 with follow up and close monitoring prior to cycle 2 of treatment.  Had urology follow up 9/4/2024 noting suprapubic catheter replaced and recs for SPT for forseeable future. Per patient, had chemotherapy last week without major side effects. Had one time emesis today, no blood.   -UA with large blood, moderate leukocyte esterase, many bacteria  -s/p keflex PO in ED 1 capsule 500mg  -Renal ultrasound, prelim read: Mild right hydroureteronephrosis. No left hydronephrosis.     #MADHU  #3a chronic kidney disease  Cr 2.14 appears elevated from baseline 0.76    #hyponatremia  #hypomagnesia  #hypokalemia  Sodium 125 on admission. K 3.0  Patient Instructions by Niraj Aguilar MD at 11/28/17 04:59 PM     Author:  Niraj Aguilar MD Service:  (none) Author Type:  Physician     Filed:  11/28/17 05:00 PM Encounter Date:  11/28/2017 Status:  Signed     :  Niraj Aguilar MD (Physician)            1. Continue to work on diet.   2. Continue with present meds.   3. Return in April for repeat fasting blood work and follow up.     Additional Educational Resources:  For additional resources regarding your symptoms, diagnosis, or further health information, please visit the Health Resources section on [email or web address] or the Online Health Resources section in [employer].        Revision History        User Key Date/Time User Provider Type Action    > [N/A] 11/28/17 05:00 PM Niraj Aguilar MD Physician Sign             40meq replaced, Mg 1.1 400mg replaced  -urine osms pending   -urine sodium   -osmolality pending    #orthopnea  #bilateral lower extremity edema  Per patients daughter on admission interview, patient was taking diuretics and recently discontinued. BNP elevated in 2,000.   CXR without effusions. Appears that recent hospitalization in August was on PTA furosemide 40mg PO and had not been on this. Last echo 7/10/2024 Global and regional left ventricular function is normal with an EF of 60-65%. Right ventricular function, chamber size, wall motion, and thickness are normal. CXR on admission no pneumothorax or pleural effusion. Right chest port catheter top at low SVC level. Calcified left basilar granuloma.     #paranoid schizophrenia  #depression  During recent admission in August, concern for confusion and dysarthria/dyskinesia. These symptoms were thought to be due to polypharmacy. Neurology was consulted and held antipsychotics and anti-cholingergic/sedating agents including Cogentin/benztropine, Seroquel/quetiapine,  Neurontin/gabapentin, Ditropan/oxybutynin, Oxycodone, Robaxin/methocarbamol,  Zanaflex/tizanidine,  Ambien/zolpidem. Patient declined medications due to concern for return of neurologic symptoms, and following discussion with psychiatry ultimately decdied to discontinue Seroquel, Sertraline, and Gabapentin per patient request. She will follow up with Psychiatry for further management as an outpatient. Outpatient psych visit noting to continue sertraline (ZOLOFT) 100 mg oral tablet TAKE 1 & 1/2 TABLETS BY MOUTH DAILY,benztropine (COGENTIN) 1 mg oral TAB every day, zolpidem (AMBIEN) 10 mg oral TABS at bedtime, QUEtiapine (SEROQUEL) 400 mg oral TABS BID. However, per patient she does not think she is taking any medications for mood and cannot recall the names of any psychiatric medications.     #HTN  #Afib on chronic anticoagulation  Appears that apixaban was held after discharge 8/9/2024. Had visit with  "Dr Herrera 8/20/24 and plan was for port placement 8/226 for chemo and to restart doac after. Also to restart atenolol.   -PTA apixaban (although says missed last 2 days)  -PTA atenolol      #h/o gastric bypass    #h/o chronic pain  On prior hospitalization in August 2024, noting history of chronic pain that was on gabapentin and oxycodone. Ultimately discontinued and was discharged on tylenol. Per patient, takes oxycodone 5mg, and some topical medica.     #anemia of chronic disease due to 3a chronic kidney disease  Admission hgb 9.6       Diet:  NPO  DVT Prophylaxis: PTA apixaban   Shahid Catheter: Not present  Fluids: none  Lines: PRESENT             Cardiac Monitoring: None  Code Status:  Full     Clinically Significant Risk Factors Present on Admission        # Hypokalemia: Lowest K = 3 mmol/L in last 2 days, will replace as needed  # Hyponatremia: Lowest Na = 125 mmol/L in last 2 days, will monitor as appropriate    # Hypomagnesemia: Lowest Mg = 1.1 mg/dL in last 2 days, will replace as needed   # Hypoalbuminemia: Lowest albumin = 2.7 g/dL at 9/21/2024 12:04 AM, will monitor as appropriate  # Drug Induced Coagulation Defect: home medication list includes an anticoagulant medication  # Thrombocytopenia: Lowest platelets = 58 in last 2 days, will monitor for bleeding  # Acute Kidney Injury, unspecified: based on a >150% or 0.3 mg/dL increase in last creatinine compared to past 90 day average, will monitor renal function  # Hypertension: Noted on problem list    # Anemia: based on hgb <11       # Overweight: Estimated body mass index is 26.89 kg/m  as calculated from the following:    Height as of 9/4/24: 1.6 m (5' 3\").    Weight as of 9/9/24: 68.9 kg (151 lb 12.8 oz).       # Financial/Environmental Concerns:           Disposition Plan      Expected Discharge Date: 09/23/2024                    Radha Fischer MD  Medicine Service, [facility]  Securely " message with Radha (more info)  Text page via [facility] Paging/Directory   See signed in provider for up to date coverage information  ______________________________________________________________________    Chief Complaint   C/O SOB, increases leg swelling,confusion, and cloudy .Daughter states pt has not had much sleep in the last 32 days. Symptoms started ~2 days ago and have progressively gotten worse. Pt has a suprapubic catheter.     History is obtained from the patient    History of Present Illness   Alis Hartman is a 69 year old female who has a 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   Per daughter, patient has been on diuretics in the past but recently taken off. Noted increased swelling in the legs and some orthopnea. No O2 at home. Suprapubic catheter in place. Patient alert and oriented x4.     ED workup EKG with sinus tachy. CXR right chest port, calcified left basilar granuloma. Lungs otherwise clear.   Labs elevated BNP 2,000, soidum 125, low potassium 3.0, low Mg 1.1. Cr 2.14 up from 0.76 prior.   Treated with keflex and cultures pending.     Per patient this morning, feel better than yesterday at presentation with more energy. Came in because her urine looked cloudy and was concerned about UTI. No flank pain. Chronic pain in low back and knees.     Past Medical History    Past Medical History:   Diagnosis Date    Atrial fibrillation (H)     Depression     Hypertension     Malignant neoplasm of endocervix (H)     Tx with radiation    Orthopnea 9/21/2024    Other chronic pain     Low back    Schizophrenia (H)     Urinary incontinence        Past Surgical History   Past Surgical History:   Procedure Laterality Date    ABDOMINOPLASTY      BIOPSY CERVICAL, LOCAL  EXCISION, SINGLE/MULTIPLE  10/26/2011    Procedure:BIOPSY CERVICAL, LOCAL EXCISION, SINGLE/MULTIPLE; EUA, cervical biopsies; Surgeon:BETTY TINEO; Location:[facility] OR    BIOPSY VAGINAL N/A 6/8/2021    Procedure: BIOPSY, VAGINA;  Surgeon: Dany Perez MD;  Location: [facility] OR    CYSTOSCOPY N/A 5/17/2024    Procedure: Cystoscopy;  Surgeon: Dany Perez MD;  Location: [facility] OR    CYSTOSTOMY, INSERT TUBE SUPRAPUBIC, COMBINED  7/12/2024    Procedure: Insertion of supra-pubic catheter;  Surgeon: Dany Perez MD;  Location: [facility] OR    DILATION AND CURETTAGE, WITH ULTRASOUND GUIDANCE N/A 5/17/2024    Procedure: Dilation and curettage of the uterus with drainage of uterine fluid under ultrasound guidance, lysis of vaginal adhesions;  Surgeon: Dany Perez MD;  Location: [facility] OR    EXAM UNDER ANESTHESIA PELVIC N/A 3/12/2020    Procedure: Exam under anesthsia, vaginal biopsies, possible CO2 laser of the vagina;  Surgeon: Lilliam Roy MD;  Location: [facility] OR    GI SURGERY  2004    gastric bypass    HYSTERECTOMY TOTAL ABDOMINAL, BILATERAL SALPINGO-OOPHORECTOMY, COMBINED Bilateral 7/12/2024    Procedure: Diagnostic laparoscopy converted to exploratory laparotomy, total abdominal hysterectomy, bilateral salpingo-oophorectomy, lysis of adhesions >60 min;  Surgeon: Dany Perez MD;  Location: [facility] OR    INSERT PORT VASCULAR ACCESS N/A 8/26/2024    Procedure: Insert port vascular access;  Surgeon: Avery Gregory MD;  Location: [facility] OR    IR CHEST PORT PLACEMENT > 5 YRS OF AGE  8/26/2024    LASER CO2 VAGINA N/A 3/2/2015    Procedure: LASER CO2 VAGINA;  Surgeon: Mariela Abdalla MD;  Location: [facility] OR    LASER CO2 VAGINA N/A 5/24/2019    Procedure: Exam under anesthesia, vaginal biopsies, CO2 laser of the vagina;  Surgeon: Lilliam Roy MD;  Location: [facility] OR    LASER CO2 VAGINA N/A 6/8/2021    Procedure: Exam under anesthesia, laser ablation of the upper vagina;  Surgeon: Dany Perez MD;  Location:   OR    REPAIR BLADDER N/A 7/12/2024    Procedure: Repair bladder;  Surgeon: Dany Perez MD;  Location: [facility] OR       Prior to Admission Medications   Prior to Admission Medications   Prescriptions Last Dose Informant Patient Reported? Taking?   acetaminophen (TYLENOL) 325 MG tablet   No No   Sig: Take 2 tablets (650 mg) by mouth every 6 hours as needed for mild pain   albuterol (PROAIR HFA/PROVENTIL HFA/VENTOLIN HFA) 108 (90 Base) MCG/ACT inhaler   Yes No   Sig: Inhale 1-2 puffs into the lungs every 4 hours as needed for shortness of breath   apixaban ANTICOAGULANT (ELIQUIS) 5 MG tablet   No No   Sig: Take 1 tablet (5 mg) by mouth 2 times daily   atenolol (TENORMIN) 25 MG tablet   No No   Sig: Take 1 tablet (25 mg) by mouth daily   calcium Citrate-vitamin D 500-400 MG-UNIT CHEW   Yes No   Sig: Take 1 tablet by mouth daily   childrens multivitamin w/iron (FLINTSTONES COMPLETE) 60 MG chewable tablet   Yes No   Sig: Take 2 tablets by mouth daily   cholecalciferol 125 MCG (5000 UT) CAPS   Yes No   Sig: Take 5000 mg 4 times a week   cyanocobalamin (CYANOCOBALAMIN) 1000 MCG/ML injection   Yes No   Sig: Inject 1 mL (1,000 mcg) into a muscle every month.   dexAMETHasone (DECADRON) 4 MG tablet   No No   Sig: Take 2 tablets (8 mg) by mouth daily (with breakfast). Take daily x 3 days following chemo   diclofenac (VOLTAREN) 1 % topical gel   Yes No   Sig: Apply to: Skin on  Both/All Knee for pain. Topical 1% gel, 4 g applied TOPICALLY to lower extremities 3 times daily PRN ;   ferrous sulfate (CASSANDRA-IN-SOL) 75 (15 FE) MG/ML oral drops   Yes No   Sig: Take 15 mg by mouth daily   hydrocortisone 2.5 % cream   Yes No   Sig: Apply topically as needed   lidocaine (XYLOCAINE) 5 % external ointment   Yes No   Sig: Apply 1 Application topically as needed   naloxone (NARCAN) 4 MG/0.1ML nasal spray   No No   Sig: Spray 1 spray (4 mg) into one nostril alternating nostrils as needed for opioid reversal every 2-3 minutes until assistance  arrives   Patient not taking: Reported on 9/9/2024   ondansetron (ZOFRAN) 8 MG tablet   No No   Sig: Take 1 tablet (8 mg) by mouth every 8 hours as needed for nausea   oxyCODONE (ROXICODONE) 5 MG tablet   No No   Sig: Take 1 tablet (5 mg) by mouth every 6 hours as needed for breakthrough pain   polyethylene glycol (MIRALAX) 17 GM/Dose powder   No No   Sig: Take 17 g by mouth daily as needed for constipation   prochlorperazine (COMPAZINE) 5 MG tablet   No No   Sig: Take 1-2 tablets (5-10 mg) by mouth every 6 hours as needed for nausea or vomiting   senna-docusate (SENOKOT-S/PERICOLACE) 8.6-50 MG tablet   No No   Sig: Take 1 tablet by mouth 2 times daily   zolpidem (AMBIEN) 10 MG tablet   Yes No   Sig: Take 10 mg by mouth nightly as needed      Facility-Administered Medications: None        Social History   I have reviewed this patient's social history and updated it with pertinent information if needed.  Social History     Tobacco Use    Smoking status: Never    Smokeless tobacco: Never   Substance Use Topics    Alcohol use: Not Currently    Drug use: No    Lives at home with grandson and granddaughter. Ambulates independently and without assistance of ADLs.     Physical Exam   Vital Signs: Temp: 97.8  F (36.6  C) Temp src: Oral BP: (!) 109/99 Pulse: 104   Resp: 16 SpO2: 100 % O2 Device: None (Room air)    Weight: 0 lbs 0 oz    General Appearance: Appears well, in no acute distress  Respiratory: CTAB on anterior ausculation  Cardiovascular: RRR no extra heart sounds. Lower extremity edema to shins.  GI: No pain to palpation in all 4 quadrants. Has cover on suprapubic catheter.   Skin: Lower extremities with chronic statis changes  Other: A&Ox4    Medical Decision Making           Data     I have personally reviewed the following data over the past 24 hrs:    6.4  \   9.6 (L)   / 58 (L)     125 (L) 91 (L) 21.0 /  100 (H)   3.0 (L) 16 (L) 2.14 (H) \     ALT: 8 AST: 9 AP: 84 TBILI: 0.5   ALB: 2.7 (L) TOT PROTEIN: 5.7  (L) LIPASE: N/A     Trop: N/A BNP: 2,025 (H)     TSH: 2.00 T4: N/A A1C: N/A       Imaging results reviewed over the past 24 hrs:   Recent Results (from the past 24 hour(s))   Chest XR,  PA & LAT    Narrative    EXAM: XR CHEST 2 VIEWS  LOCATION: [facility]  DATE: 9/20/2024    INDICATION: dyspnea  COMPARISON: 5/3/2024      Impression    IMPRESSION: Normal heart size. Right chest port catheter tip at low SVC level. Calcified left basilar granuloma. Lungs otherwise clear. No pneumothorax or pleural effusion. Gas-filled loops of bowel in the upper abdomen.   US Renal Complete Non-Vascular    Impression    RESIDENT PRELIMINARY INTERPRETATION  IMPRESSION:  Mild right hydroureteronephrosis. No left hydronephrosis.

## 2024-09-21 NOTE — PLAN OF CARE
Goal Outcome Evaluation:         Shift: [id number]    V/S & Pain: VSS on RA. Denies Pain  Neuro: AOx4, calm and cooperative  Respiratory: Stable on RA, lung sounds clear bilaterally  Cardiac: WDL  Skin: No new skin concerns  GI/: Voids spontaneously, BM 09/21  L/D:kent, port  Activity:Continue POC  Labs: monitoring, RN managed    Events this shift: no acute events this shift. Pt remains vitally stable on RA. Call light within reach, calls appropriately

## 2024-09-21 NOTE — ED PROVIDER NOTES
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  According to the daughter, the patient has previously been on a diuretic medication for leg swelling however she was taken off of that relatively recently.  She subsequently had increased swelling in her legs and some orthopnea that she describes at night when trying to lie flat.  She does not wear oxygen.  She has no shortness of breath sitting upright at rest.  Patient reports that she think she might of urinary tract infection contributing to feelings of fatigue.  She denies any headache, focal weakness or numbness, vision changes, chest pain, abdominal pain, nausea, vomiting, diarrhea, or other specific acute complaints at this time.    Past Medical History  Past Medical History:   Diagnosis Date    Atrial fibrillation (H)     Depression     Hypertension     Malignant neoplasm of endocervix (H)     Tx with radiation    Other chronic pain     Low back    Schizophrenia (H)     Urinary incontinence      Past Surgical History:   Procedure Laterality Date    ABDOMINOPLASTY      BIOPSY CERVICAL, LOCAL EXCISION, SINGLE/MULTIPLE  10/26/2011    Procedure:BIOPSY CERVICAL, LOCAL EXCISION, SINGLE/MULTIPLE; EUA, cervical biopsies; Surgeon:BETTY TINEO; Location:[facility]    BIOPSY VAGINAL N/A 6/8/2021    Procedure: BIOPSY, VAGINA;  Surgeon: Dany Perez MD;  Location: [facility]    CYSTOSCOPY N/A 5/17/2024    Procedure: Cystoscopy;  Surgeon: Dany Perez MD;  [facility]    CYSTOSTOMY, INSERT TUBE SUPRAPUBIC,  COMBINED  7/12/2024    Procedure: Insertion of supra-pubic catheter;  Surgeon: Dany Perez MD;  [facility]    DILATION AND CURETTAGE, WITH ULTRASOUND GUIDANCE N/A 5/17/2024    Procedure: Dilation and curettage of the uterus with drainage of uterine fluid under ultrasound guidance, lysis of vaginal adhesions;  Surgeon: Dany Perez MD;  [facility]    EXAM UNDER ANESTHESIA PELVIC N/A 3/12/2020    Procedure: Exam under anesthsia, vaginal biopsies, possible CO2 laser of the vagina;  Surgeon: Lilliam Roy MD;  [facility]    GI SURGERY  2004    gastric bypass    HYSTERECTOMY TOTAL ABDOMINAL, BILATERAL SALPINGO-OOPHORECTOMY, COMBINED Bilateral 7/12/2024    Procedure: Diagnostic laparoscopy converted to exploratory laparotomy, total abdominal hysterectomy, bilateral salpingo-oophorectomy, lysis of adhesions >60 min;  Surgeon: Dany Perez MD;  [facility]    INSERT PORT VASCULAR ACCESS N/A 8/26/2024    Procedure: Insert port vascular access;  Surgeon: Avery Gregory MD;  [facility]    IR CHEST PORT PLACEMENT > 5 YRS OF AGE  8/26/2024    LASER CO2 VAGINA N/A 3/2/2015    Procedure: LASER CO2 VAGINA;  Surgeon: Mariela Abdalla MD;  Location: [facility]    LASER CO2 VAGINA N/A 5/24/2019    Procedure: Exam under anesthesia, vaginal biopsies, CO2 laser of the vagina;  Surgeon: Lilliam Roy MD;  [facility]    LASER CO2 VAGINA N/A 6/8/2021    Procedure: Exam under anesthesia, laser ablation of the upper vagina;  Surgeon: Dany Perez MD;  [facility]    REPAIR BLADDER N/A 7/12/2024    Procedure: Repair bladder;  Surgeon: Dany Perez MD;  [facility]     acetaminophen (TYLENOL) 325 MG tablet  albuterol (PROAIR HFA/PROVENTIL HFA/VENTOLIN HFA) 108 (90 Base) MCG/ACT inhaler  apixaban ANTICOAGULANT (ELIQUIS) 5 MG tablet  atenolol (TENORMIN) 25 MG tablet  calcium Citrate-vitamin D 500-400 MG-UNIT CHEW  cephALEXin (KEFLEX) 500 MG capsule  childrens  multivitamin w/iron (FLINTSTONES COMPLETE) 60 MG chewable tablet  cholecalciferol 125 MCG (5000 UT) CAPS  cyanocobalamin (CYANOCOBALAMIN) 1000 MCG/ML injection  dexAMETHasone (DECADRON) 4 MG tablet  diclofenac (VOLTAREN) 1 % topical gel  ferrous sulfate (CASSANDRA-IN-SOL) 75 (15 FE) MG/ML oral drops  hydrocortisone 2.5 % cream  lidocaine (XYLOCAINE) 5 % external ointment  naloxone (NARCAN) 4 MG/0.1ML nasal spray  ondansetron (ZOFRAN) 8 MG tablet  oxyCODONE (ROXICODONE) 5 MG tablet  polyethylene glycol (MIRALAX) 17 GM/Dose powder  prochlorperazine (COMPAZINE) 5 MG tablet  senna-docusate (SENOKOT-S/PERICOLACE) 8.6-50 MG tablet  zolpidem (AMBIEN) 10 MG tablet      Allergies   Allergen Reactions    Ibuprofen Nausea and Vomiting    Shrimp     Sulfa Antibiotics Rash     Patient started on bactrim 7/24/24 tolerating medication without rash on 7/25      Family History  Family History   Problem Relation Age of Onset    Heart Disease Father     Heart Failure Father     No Known Problems Brother     No Known Problems Brother     No Known Problems Brother     Pancreatitis Brother     Hypertension Sister     Peripheral Vascular Disease Sister     No Known Problems Sister     No Known Problems Son     No Known Problems Daughter      Social History   Social History     Tobacco Use    Smoking status: Never    Smokeless tobacco: Never   Substance Use Topics    Alcohol use: Not Currently    Drug use: No      Past medical history, past surgical history, medications, allergies, family history, and social history were reviewed with the patient. No additional pertinent items.   A medically appropriate review of systems was performed with pertinent positives and negatives noted in the HPI, and all other systems negative.    Physical Exam   BP: 109/73  Pulse: 74  Temp: 98  F (36.7  C)  Resp: 16  SpO2: 97 %  Physical Exam  Vitals and nursing note reviewed.   Constitutional:       General: She is not in acute distress.     Appearance: Normal  appearance. She is not diaphoretic.   HENT:      Head: Atraumatic.      Mouth/Throat:      Mouth: Mucous membranes are moist.   Eyes:      General: No scleral icterus.     Conjunctiva/sclera: Conjunctivae normal.   Cardiovascular:      Rate and Rhythm: Normal rate.      Heart sounds: Normal heart sounds.   Pulmonary:      Effort: Pulmonary effort is normal. No respiratory distress.      Breath sounds: Normal breath sounds.   Abdominal:      General: Abdomen is flat.      Palpations: Abdomen is soft.      Comments: Suprapubic catheter in place draining yellow liquid   Musculoskeletal:      Cervical back: Neck supple.   Skin:     General: Skin is warm.      Findings: No rash.   Neurological:      General: No focal deficit present.      Mental Status: She is alert and oriented to person, place, and time.   Psychiatric:         Mood and Affect: Mood normal.         Behavior: Behavior normal.           ED Course, Procedures, & Data      Procedures            EKG Interpretation:      Interpreted by Lei Gastelum MD  Time reviewed: 22:44  Symptoms at time of EKG: dyspnea   Rhythm: normal sinus   Rate: tachycardia  Axis: normal  Ectopy: none  Conduction: normal  ST Segments/ T Waves: No ST-T wave changes  Q Waves: none  Comparison to prior: Unchanged from 8/20/2024 with exception of sinus tach today and normal rate on prior     Clinical Impression: normal sinus rhythm, no stemi, normal OK intervals           Results for orders placed or performed during the hospital encounter of 09/20/24   Chest XR,  PA & LAT     Status: None    Narrative    EXAM: XR CHEST 2 VIEWS  LOCATION: [facility]  DATE: 9/20/2024    INDICATION: dyspnea  COMPARISON: 5/3/2024      Impression    IMPRESSION: Normal heart size. Right chest port catheter tip at low SVC level. Calcified left basilar granuloma. Lungs otherwise clear. No pneumothorax or pleural effusion. Gas-filled loops of bowel in the  upper abdomen.   Comprehensive metabolic panel     Status: Abnormal   Result Value Ref Range    Sodium 125 (L) 135 - 145 mmol/L    Potassium 3.0 (L) 3.4 - 5.3 mmol/L    Carbon Dioxide (CO2) 16 (L) 22 - 29 mmol/L    Anion Gap 18 (H) 7 - 15 mmol/L    Urea Nitrogen 21.0 8.0 - 23.0 mg/dL    Creatinine 2.14 (H) 0.51 - 0.95 mg/dL    GFR Estimate 24 (L) >60 mL/min/1.73m2    Calcium 8.0 (L) 8.8 - 10.4 mg/dL    Chloride 91 (L) 98 - 107 mmol/L    Glucose 100 (H) 70 - 99 mg/dL    Alkaline Phosphatase 84 40 - 150 U/L    AST 9 0 - 45 U/L    ALT 8 0 - 50 U/L    Protein Total 5.7 (L) 6.4 - 8.3 g/dL    Albumin 2.7 (L) 3.5 - 5.2 g/dL    Bilirubin Total 0.5 <=1.2 mg/dL   TSH with free T4 reflex     Status: Normal   Result Value Ref Range    TSH 2.00 0.30 - 4.20 uIU/mL   Magnesium     Status: Abnormal   Result Value Ref Range    Magnesium 1.1 (L) 1.7 - 2.3 mg/dL   Phosphorus     Status: Normal   Result Value Ref Range    Phosphorus 3.0 2.5 - 4.5 mg/dL   BNP     Status: Abnormal   Result Value Ref Range    N terminal Pro BNP Inpatient 2,025 (H) 0 - 900 pg/mL   CBC with platelets and differential     Status: Abnormal (Preliminary result)   Result Value Ref Range    WBC Count 6.4 4.0 - 11.0 10e3/uL    RBC Count 3.44 (L) 3.80 - 5.20 10e6/uL    Hemoglobin 9.6 (L) 11.7 - 15.7 g/dL    Hematocrit 29.8 (L) 35.0 - 47.0 %    MCV 87 78 - 100 fL    MCH 27.9 26.5 - 33.0 pg    MCHC 32.2 31.5 - 36.5 g/dL    RDW 21.1 (H) 10.0 - 15.0 %    Platelet Count 58 (L) 150 - 450 10e3/uL    NRBCs per 100 WBC 0 <1 /100    Absolute NRBCs 0.0 10e3/uL   EKG 12 lead     Status: None (Preliminary result)   Result Value Ref Range    Systolic Blood Pressure  mmHg    Diastolic Blood Pressure  mmHg    Ventricular Rate 128 BPM    Atrial Rate 128 BPM    OR Interval 140 ms    QRS Duration 68 ms     ms    QTc 458 ms    P Axis 52 degrees    R AXIS -11 degrees    T Axis 64 degrees    Interpretation ECG       Sinus tachycardia with Premature atrial complexes  Possible Left  atrial enlargement  Cannot rule out Anterior infarct , age undetermined  Abnormal ECG     CBC with Platelets & Differential     Status: Abnormal (In process)    Narrative    The following orders were created for panel order CBC with Platelets & Differential.  Procedure                               Abnormality         Status                     ---------                               -----------         ------                     CBC with platelets and d...[[id number]]  Abnormal            Preliminary result         RBC and Platelet Morphology[[id number]]                      In process                   Please view results for these tests on the individual orders.     Medications - No data to display  Labs Ordered and Resulted from Time of ED Arrival to Time of ED Departure   COMPREHENSIVE METABOLIC PANEL - Abnormal       Result Value    Sodium 125 (*)     Potassium 3.0 (*)     Carbon Dioxide (CO2) 16 (*)     Anion Gap 18 (*)     Urea Nitrogen 21.0      Creatinine 2.14 (*)     GFR Estimate 24 (*)     Calcium 8.0 (*)     Chloride 91 (*)     Glucose 100 (*)     Alkaline Phosphatase 84      AST 9      ALT 8      Protein Total 5.7 (*)     Albumin 2.7 (*)     Bilirubin Total 0.5     MAGNESIUM - Abnormal    Magnesium 1.1 (*)    NT PROBNP INPATIENT - Abnormal    N terminal Pro BNP Inpatient 2,025 (*)    CBC WITH PLATELETS AND DIFFERENTIAL - Abnormal    WBC Count 6.4      RBC Count 3.44 (*)     Hemoglobin 9.6 (*)     Hematocrit 29.8 (*)     MCV 87      MCH 27.9      MCHC 32.2      RDW 21.1 (*)     Platelet Count 58 (*)     NRBCs per 100 WBC 0      Absolute NRBCs 0.0     TSH WITH FREE T4 REFLEX - Normal    TSH 2.00     PHOSPHORUS - Normal    Phosphorus 3.0     ROUTINE UA WITH MICROSCOPIC REFLEX TO CULTURE   RBC AND PLATELET MORPHOLOGY     Chest XR,  PA & LAT   Final Result   IMPRESSION: Normal heart size. Right chest port catheter tip at low SVC level. Calcified left basilar granuloma. Lungs otherwise clear. No pneumothorax  or pleural effusion. Gas-filled loops of bowel in the upper abdomen.             Critical care was not performed.     Medical Decision Making  The patient's presentation was of moderate complexity (an undiagnosed new problem with uncertain prognosis).    The patient's evaluation involved:  review of external note(s) from 1 sources (see separate area of note for details)  review of 3+ test result(s) ordered prior to this encounter (see separate area of note for details)  ordering and/or review of 3+ test(s) in this encounter (see separate area of note for details)    The patient's management necessitated moderate risk (prescription drug management including medications given in the ED).    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  Patient is nontoxic-appearing on exam, his vital signs within normal limits.  Despite complaints of confusion on history, the patient is alert and oriented, provides a coherent history, denies any altered mental status.  Patient's daughter is in the room and corroborates this.  Patient worked up with CBC, comprehensive metabolic panel, BNP, EKG, chest x-ray given the orthopnea, TSH, and urinalysis.     Labwork showed elevated BNP today and in light of lower extremity edema, orthopnea, and recently stopped diuretics, patient might benefit from trial of diuretics again. Labwork also concerning for hyponatremia and MADHU with creatinine 2.14 up from prior of 0.76. Urine lytes and osmolality sent. In light of lab abnormalities and patient's symptoms, patient was admitted to medicine for further care.     Regarding urine, the patient's suprapubic catheter bag was replaced today and urinalysis  sent. Patient treated with keflex with cultures pending.     I have reviewed the nursing notes. I have reviewed the findings, diagnosis, plan and need for follow up with the patient.    New Prescriptions    CEPHALEXIN (KEFLEX) 500 MG CAPSULE    Take 1 capsule (500 mg) by mouth 3 times daily for 7 days.       Final diagnoses:   Urinary tract infection associated with cystostomy catheter, initial encounter  (H24)   Orthopnea   Bilateral lower extremity edema   Hyponatremia   MADHU (acute kidney injury) (H24)       Lei Gastelum MD  [facility] EMERGENCY DEPARTMENT  9/20/2024     Lei Gastelum MD  09/21/24 0129

## 2024-09-21 NOTE — PROGRESS NOTES
[facility]    Medicine Progress Note - Hospitalist Service, GOLD TEAM 9    Date of Admission:  9/20/2024    Assessment & Plan   69-year-old female who presented to the ED due to concern for UTI and confusion.  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    #Catheter-associated UTI  #Gram negative bacteremia  #Hx of Klebsiella pna ESBL UTI  #Mild right hydroureteronephrosis   #Recent suprapubic cath placement - 8/2024  #S/p partial cystotomy with complex bladder closure and omental flap- 7/2024  - Pt with complex recent hx of SNEHA/BSO and need for intra-op partial bladder resection and repair, she has SPT and follows OP with urology, recently noted foul-smelling & cloudy urine, along with dysuria, last time SPT changed was 9/4 with plan to replace it q month. No fever/chills, CVAT, or flank pain.  - Blood cx growing GNR,   - Follow up on urine cx  - Given hx of ESBL will start meropenem and de-escalate as needed  - Will consult  urology for their help with SPT exchange  - No leukocytosis, fever, lactic acidosis, no CVAT, no flank pain, low suspicion for perinephric abscess, will hold on CT w/con given new MADHU    #B/l LE edema  #Anasarca  #Hypoalbuminemia  #MADHU on CKD stage II (baseline ~ 0.9)- likely intrinsic  #Hypotonic hyponatremia- asymptomatic- hypervolemic  #Hypokalemia  - Pt and daughter mention the edema is new likely for a few days, unclear at this point what is the main etiology of her MADHU, maybe new nephrotic syndrome? Questionable SE of her Taxol/Carbo infusion? her renal fx was normal 2 weeks ago, she has good UO from her SPT, she endorses she was taking Lasix in the past  however it was discontinued per chart checking and she confirms she has not been taking it, she states she eats variety of food including protein, carb, veggies, and her diet is not only water and carbs. Last TTE from June/July with normal EF, elevated urine protein at 300mg/dl (compared to 50 back in July), also high RBC in urine, will consider acute GN on ddx, urine osm in 300s which indicates her ADH is not suppressed, no low BP detected here. FeNa 9% consistent with intrinsic etiologies.     - Strict I/O  - Follow up on repeat TTE   - Ordered 24 hr urine protein, HIV, C3, C4, hepatitis panel, lipid panel, A1c, BÁRBARA, serum free light chain, AM cortisol  - TSH wnl   - Patient not symptomatic with hyponatremia, likely >48hr, will trend q8hr- no indication for hypertonic saline  - Trend CMP and consider renal consult if not improving    #Anemia  #Thrombocytopenia  - Plt wnl 2 weeks ago and now low at 40-50K, she has been started on her Taxol/Carbo infusions and this could be BM suppression from chemo, will monitor, no e/o bleeding so far  - Will get MT labs    Chronic conditions:  # History of REMA-III, s/p laser therapy  # History of cevical cancer, s/p radiation therapy  # History of gastric bypass   # Atrial fibrillation - on home Eliquis, will hold on it given low plt  # Hypertension   # Chronic pain  # Osteoarthritis  #Paranoid schizophrenia  #RAFAEL    - Will need to do an admission med reconciliation as this was not done on admission and patient is on multiple meds per chart checking that needs clarification before resuming.      Diet: Renal Diet (non-dialysis)    DVT Prophylaxis: Pneumatic Compression Devices  Shahid Catheter: Not present  Lines: PRESENT      Port a Cath 08/26/24 Single Lumen Right Chest wall-Site Assessment: WDL      Cardiac Monitoring: ACTIVE order. Indication: Acute decompensated heart failure (48 hours)  Code Status: Full Code      Clinically Significant Risk Factors Present on Admission     "    # Hypokalemia: Lowest K = 3 mmol/L in last 2 days, will replace as needed  # Hyponatremia: Lowest Na = 125 mmol/L in last 2 days, will monitor as appropriate    # Hypomagnesemia: Lowest Mg = 1.1 mg/dL in last 2 days, will replace as needed   # Hypoalbuminemia: Lowest albumin = 2.4 g/dL at 9/21/2024  9:03 AM, will monitor as appropriate  # Drug Induced Coagulation Defect: home medication list includes an anticoagulant medication  # Thrombocytopenia: Lowest platelets = 48 in last 2 days, will monitor for bleeding  # Acute Kidney Injury, unspecified: based on a >150% or 0.3 mg/dL increase in last creatinine compared to past 90 day average, will monitor renal function  # Hypertension: Noted on problem list    # Anemia: based on hgb <11       # Overweight: Estimated body mass index is 26.89 kg/m  as calculated from the following:    Height as of 9/4/24: 1.6 m (5' 3\").    Weight as of 9/9/24: 68.9 kg (151 lb 12.8 oz).       # Financial/Environmental Concerns:           Disposition Plan     Medically Ready for Discharge: Anticipated in 2-4 Days             Rocio Hwang MD  Hospitalist Service, GOLD TEAM [address] [place] [address]  Securely message with [employer] (more info)  Text page via [facility] Paging/Directory   See signed in provider for up to date coverage information  ______________________________________________________________________    Interval History   NAEON, patient is able to give appropriate hx however unable to give details and specifics, doing well, main complaint is her urinary symptoms, discussed the plan with her. Talked with her daughter over phone, questions were answered and plan was discussed.     Physical Exam   Vital Signs: Temp: 97.2  F (36.2  C) Temp src: Oral BP: (!) 142/97 Pulse: 92   Resp: 18 SpO2: 100 % O2 Device: None (Room air)    Weight: 0 lbs 0 oz    Gen: A&Ox4, lying comfortably on bed, in no distress, doing well  Lung: clear on ausculation " b/l, no wheezing/crackle  Heart: no rub/murmur/gallop  Abd: soft, non-tender, non-distended  Ext: 3+ edema on LE up to pelvic area b/l, warm, no skin lesion      Medical Decision Making       100 MINUTES SPENT BY ME on the date of service doing chart review, history, exam, documentation & further activities per the note.      Data     I have personally reviewed the following data over the past 24 hrs:    5.7  \   9.1 (L)   / 48 (LL)     125 (L) 92 (L) 22.9 /  85   3.2 (L) 18 (L) 2.10 (H) \     ALT: 7 AST: 16 AP: 80 TBILI: 0.4   ALB: 2.4 (L) TOT PROTEIN: 5.6 (L) LIPASE: N/A     Trop: N/A BNP: 2,025 (H)     TSH: 2.00 T4: N/A A1C: N/A     Procal: 5.55 (HH) CRP: N/A Lactic Acid: 1.1       INR:  0.92 PTT:  N/A   D-dimer:  N/A Fibrinogen:  N/A       Imaging results reviewed over the past 24 hrs:   Recent Results (from the past 24 hour(s))   Chest XR,  PA & LAT    Narrative    EXAM: XR CHEST 2 VIEWS  LOCATION: [facility]  DATE: 9/20/2024    INDICATION: dyspnea  COMPARISON: 5/3/2024      Impression    IMPRESSION: Normal heart size. Right chest port catheter tip at low SVC level. Calcified left basilar granuloma. Lungs otherwise clear. No pneumothorax or pleural effusion. Gas-filled loops of bowel in the upper abdomen.   US Renal Complete Non-Vascular    Narrative    EXAMINATION: US RENAL COMPLETE NON-VASCULAR, 9/21/2024 3:13 AM     COMPARISON: Renal ultrasound 7/14/2024, CTAP 7/21/2024    HISTORY: MADHU, please evaluate kidneys    TECHNIQUE: The kidneys and bladder were scanned in the standard  fashion with specialized ultrasound transducer(s) using both gray  scale and limited color/spectral Doppler techniques.    FINDINGS:    Right kidney: Measures 8.9 cm in length. Parenchyma is of normal  thickness and echogenicity. No focal mass. Right pelviectasis and  proximal ureteral dilation measuring up to 2.0 cm.    Left kidney: Measures 9.5 cm in length. Parenchyma is of  normal  thickness and echogenicity. No focal mass. No hydronephrosis.     Bladder: Collapsed bladder with poorly visualized Shahid catheter in  place.      Impression    IMPRESSION:  Mild right hydroureteronephrosis. No left hydronephrosis.     I have personally reviewed the examination and initial interpretation  and I agree with the findings.    SORAYA STARKEY MD         SYSTEM ID:  [id number]

## 2024-09-22 ENCOUNTER — APPOINTMENT (OUTPATIENT)
Dept: ULTRASOUND IMAGING | Facility: CLINIC | Age: 69
End: 2024-09-22
Attending: STUDENT IN AN ORGANIZED HEALTH CARE EDUCATION/TRAINING PROGRAM
Payer: COMMERCIAL

## 2024-09-22 LAB
ACANTHOCYTES BLD QL SMEAR: ABNORMAL
ALBUMIN MFR UR ELPH: 1206 MG/DL
ALBUMIN MFR UR ELPH: 99.4 MG/DL
ALBUMIN SERPL BCG-MCNC: 2.3 G/DL (ref 3.5–5.2)
ALP SERPL-CCNC: 76 U/L (ref 40–150)
ALT SERPL W P-5'-P-CCNC: 8 U/L (ref 0–50)
ANION GAP SERPL CALCULATED.3IONS-SCNC: 11 MMOL/L (ref 7–15)
AST SERPL W P-5'-P-CCNC: 11 U/L (ref 0–45)
BACTERIA UR CULT: NORMAL
BILIRUB SERPL-MCNC: 0.3 MG/DL
BUN SERPL-MCNC: 22.8 MG/DL (ref 8–23)
CALCIUM SERPL-MCNC: 8 MG/DL (ref 8.8–10.4)
CHLORIDE SERPL-SCNC: 92 MMOL/L (ref 98–107)
COLLECT DURATION TIME UR: 24 H
CREAT SERPL-MCNC: 1.96 MG/DL (ref 0.51–0.95)
CREAT UR-MCNC: 8.6 MG/DL
EGFRCR SERPLBLD CKD-EPI 2021: 27 ML/MIN/1.73M2
ERYTHROCYTE [DISTWIDTH] IN BLOOD BY AUTOMATED COUNT: 20.7 % (ref 10–15)
GLUCOSE SERPL-MCNC: 91 MG/DL (ref 70–99)
HCO3 SERPL-SCNC: 20 MMOL/L (ref 22–29)
HCT VFR BLD AUTO: 25.7 % (ref 35–47)
HGB BLD-MCNC: 8.4 G/DL (ref 11.7–15.7)
MAGNESIUM SERPL-MCNC: 1.9 MG/DL (ref 1.7–2.3)
MCH RBC QN AUTO: 27.5 PG (ref 26.5–33)
MCHC RBC AUTO-ENTMCNC: 32.7 G/DL (ref 31.5–36.5)
MCV RBC AUTO: 84 FL (ref 78–100)
PLAT MORPH BLD: ABNORMAL
PLATELET # BLD AUTO: 47 10E3/UL (ref 150–450)
POTASSIUM SERPL-SCNC: 4 MMOL/L (ref 3.4–5.3)
POTASSIUM SERPL-SCNC: 4 MMOL/L (ref 3.4–5.3)
PROT 24H UR-MRATE: 497 MG/SPECIMEN
PROT SERPL-MCNC: 5.1 G/DL (ref 6.4–8.3)
PROT/CREAT 24H UR: 140.23 MG/MG CR (ref 0–0.2)
RBC # BLD AUTO: 3.05 10E6/UL (ref 3.8–5.2)
RBC MORPH BLD: ABNORMAL
SODIUM SERPL-SCNC: 123 MMOL/L (ref 135–145)
SODIUM SERPL-SCNC: 124 MMOL/L (ref 135–145)
SODIUM SERPL-SCNC: 128 MMOL/L (ref 135–145)
SPECIMEN VOL UR: 500 ML
WBC # BLD AUTO: 6.8 10E3/UL (ref 4–11)

## 2024-09-22 PROCEDURE — 84156 ASSAY OF PROTEIN URINE: CPT | Performed by: STUDENT IN AN ORGANIZED HEALTH CARE EDUCATION/TRAINING PROGRAM

## 2024-09-22 PROCEDURE — 93970 EXTREMITY STUDY: CPT

## 2024-09-22 PROCEDURE — 85027 COMPLETE CBC AUTOMATED: CPT | Performed by: STUDENT IN AN ORGANIZED HEALTH CARE EDUCATION/TRAINING PROGRAM

## 2024-09-22 PROCEDURE — 250N000013 HC RX MED GY IP 250 OP 250 PS 637: Performed by: STUDENT IN AN ORGANIZED HEALTH CARE EDUCATION/TRAINING PROGRAM

## 2024-09-22 PROCEDURE — 83735 ASSAY OF MAGNESIUM: CPT | Performed by: STUDENT IN AN ORGANIZED HEALTH CARE EDUCATION/TRAINING PROGRAM

## 2024-09-22 PROCEDURE — 99223 1ST HOSP IP/OBS HIGH 75: CPT | Mod: 24 | Performed by: STUDENT IN AN ORGANIZED HEALTH CARE EDUCATION/TRAINING PROGRAM

## 2024-09-22 PROCEDURE — 250N000011 HC RX IP 250 OP 636: Performed by: STUDENT IN AN ORGANIZED HEALTH CARE EDUCATION/TRAINING PROGRAM

## 2024-09-22 PROCEDURE — 120N000002 HC R&B MED SURG/OB UMMC

## 2024-09-22 PROCEDURE — 250N000013 HC RX MED GY IP 250 OP 250 PS 637: Performed by: HOSPITALIST

## 2024-09-22 PROCEDURE — 250N000013 HC RX MED GY IP 250 OP 250 PS 637

## 2024-09-22 PROCEDURE — 99233 SBSQ HOSP IP/OBS HIGH 50: CPT | Performed by: STUDENT IN AN ORGANIZED HEALTH CARE EDUCATION/TRAINING PROGRAM

## 2024-09-22 PROCEDURE — 84295 ASSAY OF SERUM SODIUM: CPT | Performed by: STUDENT IN AN ORGANIZED HEALTH CARE EDUCATION/TRAINING PROGRAM

## 2024-09-22 PROCEDURE — 93970 EXTREMITY STUDY: CPT | Mod: 26 | Performed by: RADIOLOGY

## 2024-09-22 RX ORDER — LANOLIN ALCOHOL/MO/W.PET/CERES
3 CREAM (GRAM) TOPICAL
Status: DISCONTINUED | OUTPATIENT
Start: 2024-09-22 | End: 2024-09-24

## 2024-09-22 RX ORDER — OXYCODONE HYDROCHLORIDE 5 MG/1
5 TABLET ORAL EVERY 6 HOURS PRN
Status: DISCONTINUED | OUTPATIENT
Start: 2024-09-22 | End: 2024-09-23

## 2024-09-22 RX ORDER — NALOXONE HYDROCHLORIDE 0.4 MG/ML
0.4 INJECTION, SOLUTION INTRAMUSCULAR; INTRAVENOUS; SUBCUTANEOUS
Status: DISCONTINUED | OUTPATIENT
Start: 2024-09-22 | End: 2024-09-27 | Stop reason: HOSPADM

## 2024-09-22 RX ORDER — OXYCODONE HYDROCHLORIDE 5 MG/1
5 TABLET ORAL EVERY 6 HOURS PRN
Status: DISCONTINUED | OUTPATIENT
Start: 2024-09-22 | End: 2024-09-22

## 2024-09-22 RX ORDER — NALOXONE HYDROCHLORIDE 0.4 MG/ML
0.2 INJECTION, SOLUTION INTRAMUSCULAR; INTRAVENOUS; SUBCUTANEOUS
Status: DISCONTINUED | OUTPATIENT
Start: 2024-09-22 | End: 2024-09-27 | Stop reason: HOSPADM

## 2024-09-22 RX ADMIN — CALCIUM CARBONATE (ANTACID) CHEW TAB 500 MG 1000 MG: 500 CHEW TAB at 02:56

## 2024-09-22 RX ADMIN — OXYCODONE HYDROCHLORIDE 5 MG: 5 TABLET ORAL at 01:09

## 2024-09-22 RX ADMIN — ACETAMINOPHEN 650 MG: 325 TABLET ORAL at 02:59

## 2024-09-22 RX ADMIN — OXYCODONE HYDROCHLORIDE 15 MG: 5 TABLET ORAL at 18:26

## 2024-09-22 RX ADMIN — MEROPENEM 500 MG: 500 INJECTION, POWDER, FOR SOLUTION INTRAVENOUS at 19:06

## 2024-09-22 RX ADMIN — MEROPENEM 500 MG: 500 INJECTION, POWDER, FOR SOLUTION INTRAVENOUS at 06:45

## 2024-09-22 ASSESSMENT — ACTIVITIES OF DAILY LIVING (ADL)
DEPENDENT_IADLS:: CLEANING;COOKING;LAUNDRY;TRANSPORTATION;MEAL PREPARATION
ADLS_ACUITY_SCORE: 37
ADLS_ACUITY_SCORE: 38
ADLS_ACUITY_SCORE: 38
ADLS_ACUITY_SCORE: 37
ADLS_ACUITY_SCORE: 38
ADLS_ACUITY_SCORE: 37
ADLS_ACUITY_SCORE: 38
ADLS_ACUITY_SCORE: 37
ADLS_ACUITY_SCORE: 38
ADLS_ACUITY_SCORE: 38
ADLS_ACUITY_SCORE: 37
ADLS_ACUITY_SCORE: 38
ADLS_ACUITY_SCORE: 37
ADLS_ACUITY_SCORE: 37

## 2024-09-22 NOTE — PLAN OF CARE
Goal Outcome Evaluation:      Plan of Care Reviewed With: patient    Overall Patient Progress: no changeOverall Patient Progress: no change    Outcome Evaluation: Plan to return home with established PCA and Home RN/PT services.      Augustina Barahona RN    7C RN Coordinator  Phone: [phone]  9/22/2024  Units: 7C Med Surg 7401 thru 7418 RNCC     Social Work and Care Management Department   SEARCHABLE in [facility] - search CARE COORDINATOR   [employer] ([phone]) Saturday & Sunday; ([phone]) [facility] Recognized Holidays   Units: 5A Onc 5201 - 5219 RNCC,  5A Onc 5220 thru 5240 RNCC, 5C OFFSERVICE [id number] RNCC & 5C OFF SERVICE [id number] RNCC   Units: 6B Vocera, 6C Card 6401 thru 6420 RNCC, 6C Card 6502 thru 6514 RNCC & 6C Card 6515 thru 6519 RNCC    Units: 7A SOT RNCC Vocera, 7B Med Surg Vocera, & 7C Med Surg 7502 thru 7521 RNCC   Units: 6A Vocera & 4A CVICU Vocera, 4C MICU Vocera, and 4E SICU Vocera     Units: 5 Ortho Vocera & 5 Med Surg Vocera    Units: 6 Med Surg Vocera & 8 Med Surg Vocera

## 2024-09-22 NOTE — PROVIDER NOTIFICATION
[id number]  Hello! Can you order oxycodone for pt? she states she takes 5 mg at home. Can you also order her Melatonin? Lastly, can you put in WOC consult? Pt has coccyx, pannus, forearm and thigh wound and no wound care orders.   Johnson Mcgarry Paged via [employer].   [id number]

## 2024-09-22 NOTE — CONSULTS
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     # MADHU, stable, non-oliguric   I most strongly suspect that this is ATN in the setting of bacteremia/infection. Reasonable initial serologic workup (and 24hr urine protein as spot UPCR was 140 g/g, very likely false elevated by rising serum creat-not clearing creat into urine normally) ordered by primary team on 9/21, pending (I added an SPEP as well).     # hyponatremia  Of renal failure.     # hypokalemia  Present on initial labs, now resolved. ?poor intake causing low (despite MADHU)? Carboplatin effect (but again, resolved on today's labs). CTM BMP.    # metabolic acidosis  Mild, CTM BMP.    # HTN  BPs currently soft, in setting of infxn. PTA atenolol has been held, which is reasonable.       Recommendations were communicated to primary team via this note.     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     The patient had creatinine of 0.76 on the date of her last chemo treatment. Creat was 2.1 on  presentation 9/21 and is mildly improved to 1.96 today. She denies taking NSAIDs at home. BPs have been mildly low (99/87, 103/71). Patient appears to have been on atenolol for HTN PTA. Fabi was 103 mmol/L.     In addition to creatinine elevation, patient also has serum sodium of mid to low 120s, which is also new compared to 9/9/24 labs.     PAST MEDICAL HISTORY:  Reviewed with patient on 09/22/2024   Past Medical History:   Diagnosis Date    Atrial fibrillation (H)     Depression     Hypertension     Malignant neoplasm of endocervix (H)     Tx with radiation    Orthopnea 9/21/2024    Other chronic pain     Low back    Schizophrenia (H)     Urinary incontinence        Past Surgical History:   Procedure Laterality Date    ABDOMINOPLASTY      BIOPSY CERVICAL, LOCAL EXCISION, SINGLE/MULTIPLE  10/26/2011    Procedure:BIOPSY CERVICAL, LOCAL EXCISION, SINGLE/MULTIPLE; EUA, cervical biopsies; Surgeon:BETTY TINEO; Location:[facility] OR    BIOPSY VAGINAL N/A 6/8/2021    Procedure: BIOPSY, VAGINA;  Surgeon: Dany Perez MD;  Location: [facility] OR    CYSTOSCOPY N/A 5/17/2024    Procedure: Cystoscopy;  Surgeon: Dany Perez MD;  [facility]: [facility]    CYSTOSTOMY, INSERT TUBE SUPRAPUBIC, COMBINED  7/12/2024    Procedure: Insertion of supra-pubic catheter;  Surgeon: Dany Perez MD;  [facility]    DILATION AND CURETTAGE, WITH ULTRASOUND GUIDANCE N/A 5/17/2024    Procedure: Dilation and curettage of the uterus with drainage of uterine fluid under ultrasound guidance, lysis of vaginal adhesions;  Surgeon: Dany Perez MD;  Location: [facility]    EXAM UNDER ANESTHESIA PELVIC N/A 3/12/2020    Procedure: Exam under anesthsia, vaginal biopsies, possible CO2 laser of the vagina;  Surgeon: Lilliam Roy MD;  [facility]    GI SURGERY  2004    gastric bypass    HYSTERECTOMY TOTAL ABDOMINAL, BILATERAL SALPINGO-OOPHORECTOMY, COMBINED Bilateral 7/12/2024    Procedure: Diagnostic laparoscopy converted to exploratory  laparotomy, total abdominal hysterectomy, bilateral salpingo-oophorectomy, lysis of adhesions >60 min;  Surgeon: Dany Perez MD;  [facility]    INSERT PORT VASCULAR ACCESS N/A 8/26/2024    Procedure: Insert port vascular access;  Surgeon: Avery Gregory MD;  [facility]: [facility]    IR CHEST PORT PLACEMENT > 5 YRS OF AGE  8/26/2024    LASER CO2 VAGINA N/A 3/2/2015    Procedure: LASER CO2 VAGINA;  Surgeon: Mariela Abdalla MD;  Location: [facility]    LASER CO2 VAGINA N/A 5/24/2019    Procedure: Exam under anesthesia, vaginal biopsies, CO2 laser of the vagina;  Surgeon: Lilliam Roy MD;  Location: [facility]    LASER CO2 VAGINA N/A 6/8/2021    Procedure: Exam under anesthesia, laser ablation of the upper vagina;  Surgeon: Dany Perez MD;  Location: [facility]    REPAIR BLADDER N/A 7/12/2024    Procedure: Repair bladder;  Surgeon: Dany Perez MD;  [facility]        MEDICATIONS:  PTA Meds  Prior to Admission medications    Medication Sig Last Dose Taking? Auth Provider Long Term End Date   cephALEXin (KEFLEX) 500 MG capsule Take 1 capsule (500 mg) by mouth 3 times daily for 7 days.  Yes Lei Gastelum MD  9/28/24   acetaminophen (TYLENOL) 325 MG tablet Take 2 tablets (650 mg) by mouth every 6 hours as needed for mild pain   Dany Perez MD No    albuterol (PROAIR HFA/PROVENTIL HFA/VENTOLIN HFA) 108 (90 Base) MCG/ACT inhaler Inhale 1-2 puffs into the lungs every 4 hours as needed for shortness of breath   Reported, Patient     apixaban ANTICOAGULANT (ELIQUIS) 5 MG tablet Take 1 tablet (5 mg) by mouth 2 times daily   Robin Herrera MD No    atenolol (TENORMIN) 25 MG tablet Take 1 tablet (25 mg) by mouth daily   Dany Perez MD Yes    calcium Citrate-vitamin D 500-400 MG-UNIT CHEW Take 1 tablet by mouth daily   Reported, Patient     childrens multivitamin w/iron (FLINTSTONES COMPLETE) 60 MG chewable tablet Take 2 tablets by mouth daily   Reported, Patient     cholecalciferol 125 MCG  (5000 UT) CAPS Take 5000 mg 4 times a week   Reported, Patient     cyanocobalamin (CYANOCOBALAMIN) 1000 MCG/ML injection Inject 1 mL (1,000 mcg) into a muscle every month.   Reported, Patient     dexAMETHasone (DECADRON) 4 MG tablet Take 2 tablets (8 mg) by mouth daily (with breakfast). Take daily x 3 days following chemo   Kelsey Mccracken APRN CNP Yes    diclofenac (VOLTAREN) 1 % topical gel Apply to: Skin on  Both/All Knee for pain. Topical 1% gel, 4 g applied TOPICALLY to lower extremities 3 times daily PRN ;   Reported, Patient     ferrous sulfate (CASSANDRA-IN-SOL) 75 (15 FE) MG/ML oral drops Take 15 mg by mouth daily   Reported, Patient     hydrocortisone 2.5 % cream Apply topically as needed   Reported, Patient     lidocaine (XYLOCAINE) 5 % external ointment Apply 1 Application topically as needed   Reported, Patient     naloxone (NARCAN) 4 MG/0.1ML nasal spray Spray 1 spray (4 mg) into one nostril alternating nostrils as needed for opioid reversal every 2-3 minutes until assistance arrives  Patient not taking: Reported on 9/9/2024   Gaurang Guajardo MD Yes    ondansetron (ZOFRAN) 8 MG tablet Take 1 tablet (8 mg) by mouth every 8 hours as needed for nausea   Kelsey Mccracken APRN CNP     oxyCODONE (ROXICODONE) 5 MG tablet Take 1 tablet (5 mg) by mouth every 6 hours as needed for breakthrough pain   Dany Perez MD No    polyethylene glycol (MIRALAX) 17 GM/Dose powder Take 17 g by mouth daily as needed for constipation   Dany Perez MD     prochlorperazine (COMPAZINE) 5 MG tablet Take 1-2 tablets (5-10 mg) by mouth every 6 hours as needed for nausea or vomiting   Kelsey Mccracken APRN CNP     senna-docusate (SENOKOT-S/PERICOLACE) 8.6-50 MG tablet Take 1 tablet by mouth 2 times daily   Dany Perez MD     zolpidem (AMBIEN) 10 MG tablet Take 10 mg by mouth nightly as needed   Reported, Patient        Current Meds  Current Facility-Administered Medications   Medication Dose Route Frequency Provider  Last Rate Last Admin    meropenem (MERREM) 500 mg vial to attach to  mL bag for ADULTS or 25 mL bag for PEDS  500 mg Intravenous Q12H Rocio Hwang  mL/hr at 09/22/24 0645 500 mg at 09/22/24 0645    sodium chloride (PF) 0.9% PF flush 3 mL  3 mL Intracatheter Q8H Radha Fischer MD   3 mL at 09/21/24 0821     Infusion Meds  Current Facility-Administered Medications   Medication Dose Route Frequency Provider Last Rate Last Admin       ALLERGIES:    Allergies   Allergen Reactions    Ibuprofen Nausea and Vomiting    Shrimp     Sulfa Antibiotics Rash     Patient started on bactrim 7/24/24 tolerating medication without rash on 7/25        REVIEW OF SYSTEMS:  A comprehensive of systems was negative except as noted above.    SOCIAL HISTORY:   Social History     Socioeconomic History    Marital status: Single     Spouse name: Not on file    Number of children: Not on file    Years of education: Not on file    Highest education level: Not on file   Occupational History    Not on file   Tobacco Use    Smoking status: Never    Smokeless tobacco: Never   Substance and Sexual Activity    Alcohol use: Not Currently    Drug use: No    Sexual activity: Not on file   Other Topics Concern    Parent/sibling w/ CABG, MI or angioplasty before 65F 55M? Not Asked   Social History Narrative    Not on file     Social Determinants of Health     Financial Resource Strain: Low Risk  (9/21/2024)    Financial Resource Strain     Within the past 12 months, have you or your family members you live with been unable to get utilities (heat, electricity) when it was really needed?: No   Food Insecurity: Low Risk  (9/21/2024)    Food Insecurity     Within the past 12 months, did you worry that your food would run out before you got money to buy more?: No     Within the past 12 months, did the food you bought just not last and you didn t have money to get more?: No   Transportation Needs: High Risk (9/21/2024)     Transportation Needs     Within the past 12 months, has lack of transportation kept you from medical appointments, getting your medicines, non-medical meetings or appointments, work, or from getting things that you need?: Yes   Physical Activity: Not on file   Stress: Not on file   Social Connections: Not on file   Interpersonal Safety: Low Risk  (2024)    Interpersonal Safety     Do you feel physically and emotionally safe where you currently live?: Yes     Within the past 12 months, have you been hit, slapped, kicked or otherwise physically hurt by someone?: No     Within the past 12 months, have you been humiliated or emotionally abused in other ways by your partner or ex-partner?: No   Housing Stability: Low Risk  (2024)    Housing Stability     Do you have housing? : Yes     Are you worried about losing your housing?: No   Reviewed with patient       FAMILY MEDICAL HISTORY:   Family History   Problem Relation Age of Onset    Heart Disease Father     Heart Failure Father     No Known Problems Brother     No Known Problems Brother     No Known Problems Brother     Pancreatitis Brother     Hypertension Sister     Peripheral Vascular Disease Sister     No Known Problems Sister     No Known Problems Son     No Known Problems Daughter    Patient denies a family history of kidney disease      PHYSICAL EXAM:   Temp  Av.6  F (36.4  C)  Min: 97.1  F (36.2  C)  Max: 98  F (36.7  C)      Pulse  Av.3  Min: 74  Max: 117 Resp  Av.3  Min: 14  Max: 18  SpO2  Av.3 %  Min: 94 %  Max: 100 %       /85   Pulse 94   Temp 97.6  F (36.4  C) (Oral)   Resp 14   Wt 62.4 kg (137 lb 8 oz)   SpO2 95%   BMI 24.36 kg/m        Admit Weight: 62.4 kg (137 lb 8 oz)     GENERAL APPEARANCE: no  acute distress, is awake  EYES: no scleral icterus  Pulmonary: lungs CTAB  CV: regular rhythm, normal rate, no rub   - Edema - trace LE bilat  GI: soft, NTND  MS: no gross abnormalities noted  : has suprapubic  urinary cath  SKIN: no rash on exposed skin  NEURO: conversant     LABS:   I have reviewed the following labs:  CMP  Recent Labs   Lab 09/22/24 0325 09/21/24 1848 09/21/24  0903 09/21/24  0004   *  124* 126* 125* 125*   POTASSIUM 4.0  4.0  --  3.2* 3.0*   CHLORIDE 92*  --  92* 91*   CO2 20*  --  18* 16*   ANIONGAP 11  --  15 18*   GLC 91  --  85 100*   BUN 22.8  --  22.9 21.0   CR 1.96*  --  2.10* 2.14*   GFRESTIMATED 27*  --  25* 24*   SAMUEL 8.0*  --  8.0* 8.0*   MAG 1.9  --   --  1.1*   PHOS  --   --   --  3.0   PROTTOTAL 5.1*  --  5.6* 5.7*   ALBUMIN 2.3*  --  2.4* 2.7*   BILITOTAL 0.3  --  0.4 0.5   ALKPHOS 76  --  80 84   AST 11  --  16 9   ALT 8  --  7 8     CBC  Recent Labs   Lab 09/22/24 0325 09/21/24  0903 09/21/24  0004   HGB 8.4* 9.1* 9.6*   WBC 6.8 5.7 6.4   RBC 3.05* 3.22* 3.44*   HCT 25.7* 28.5* 29.8*   MCV 84 89 87   MCH 27.5 28.3 27.9   MCHC 32.7 31.9 32.2   RDW 20.7* 21.0* 21.1*   PLT 47* 48* 58*     INR  Recent Labs   Lab 09/21/24 1848 09/21/24  0903   INR  --  0.92   PTT 26  --      ABGNo lab results found in last 7 days.   URINE STUDIES  Recent Labs   Lab Test 09/21/24 0318 07/29/24  1946 07/21/24  1721 07/21/24  1705 04/11/24  1152 05/23/23  0820 02/15/23  0927   COLOR Straw Yellow Orange* Orange* Yellow   < > Yellow   APPEARANCE Cloudy* Slightly Cloudy* Cloudy* Cloudy* Clear   < > Clear   URINEGLC Negative Negative Negative Negative Negative   < > Negative   URINEBILI Negative Negative Negative Negative Negative   < > Negative   URINEKETONE 5* Negative 10* 10* Negative   < > Negative   SG 1.015 1.011 1.010 1.009 1.010   < > 1.015   UBLD Large* Small* Large* Large* Moderate*   < > Large*   URINEPH 8.0* 7.5* 6.5 6.5 7.0   < > 7.0   PROTEIN 300* 50* 100* 100* Trace*   < > Negative   UROBILINOGEN  --   --   --   --  1.0  --  1.0   NITRITE Negative Negative Positive* Positive* Positive*   < > Positive*   LEUKEST Moderate* Large* Large* Large* Large*   < > Small*   RBCU >100* 12* 84* 84*  10-25*   < > 25-50*   WBCU >100* 78* >182* >182* >100*   < > 10-25*    < > = values in this interval not displayed.     No lab results found.  PTH  No lab results found.  IRON STUDIES  No lab results found.    IMAGING:  All imaging studies reviewed by me.     Omari Rebollar MD

## 2024-09-22 NOTE — PHARMACY
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   HPI: Patient reported to the [facility] emergency department 9/20 PM with complaints of leg swelling, confusion, fatigue and reported concern for a urinary tract infection. She was afebrile and hemodynamically stable with labs showing WBC within normal limits, elevated procalcitonin of 5.55 (elevated level expected in the setting of CKD), and UA with nitrite negative, leukocyte esterase moderate, > 100 WBC and many bacteria present. Urine and blood cultures were collected and the patient was administered a one time dose of ceftriaxone. Renal ultrasound identified mild right hydrouretronephrosis. Meropenem was later ordered due to the patient's recent history of ESBL K. pneumoniae which was grown on both urine culture and abdomen cultures in July of 2024. The patient is now growing Gram-negative bacilli in 2/2 blood culture bottles identified as K. pneumoniae on Verigene with CTX-M Class A ESBL production.  Assessment: ESBL Klebsiella pneumoniae bacteremia 2/2 urinary source  Over the past 24 hours, the patient has remained afebrile, hemodynamically stable, and with WBC within normal  limits. Agree with urology consult for potential exchange of suprapubic catheter as this is likely colonized with the ESBL K. pneumoniae and the source of infection. Agree with empiric escalation to meropenem, however now that we know we do not have Pseudomonas aeruginosa growing, consider narrowing to ertapenem. If the patient remains clinically stable over the next 24-72 hours, and source control is achieved with exchanging of the suprapubic catheter, consider step-down to oral antibiotics pending susceptibilities. The previous ESBL K. pneumoniae isolates had intermediate susceptibility to fluoroquinolones, but renally dosed Bactrim may be on option. Final duration of therapy is difficult to determine at this time and will depend on if source control can be achieved. Consider consulting infectious diseases to determine final duration of therapy.   Recommendations:  Agree with current management, consider narrowing from meropenem to ertapenem 500 mg every 24 hours.   Consider step-down to oral therapy in the next 24-72 hours pending continued clinical stability, source control, and susceptibilities to oral agents.   Consider infectious diseases consult to determine final duration of therapy.     Patient will be followed by John Cunha, PharmD, PGY2 Infectious Diseases Pharmacy Resident (please contact if further questions)  AST Pharmacist on Vocera    Current antibiotics  Anti-infectives (From now, onward)      Start     Dose/Rate Route Frequency Ordered Stop    09/21/24 1830  meropenem (MERREM) 500 mg vial to attach to  mL bag for ADULTS or 25 mL bag for PEDS         500 mg  over 30 Minutes Intravenous EVERY 12 HOURS 09/21/24 1758      09/21/24 0000  cephALEXin (KEFLEX) 500 MG capsule         500 mg Oral 3 TIMES DAILY 09/21/24 0124 09/28/24 5645

## 2024-09-22 NOTE — CONSULTS
Urologic Surgery Consultation Note  [facility]    Alis Hartman MRN# [id number]   Age: 69 year old YOB: 1955     Date of Admission:  9/20/2024    Reason for consult:   Exchange SPT       Requesting physician: Rocio Hwang MD        Level of consult: Consult, follow and place orders           Assessment:   Alis Hartman is a 69 year old female with PMH of uterine carcinoma s/p Ex-lap SNEHA/BSO/EDIE c/b cystotomy in 07/2024 s/p complex bladder closure with omental flap she had a suprapubic tube placed at that time and post-operatively prefers drainage with suprapubic tube who presented with confusion, shortness of breath, and increased LE edema. She is afebrile and hemodynamically stable. Labs notable for no leukocytosis and MADHU . Urine and blood cultures growing klebsiella. Low suspicion for obstructive component of MADHU given mild hydro on renal ultrasound (history of mild bilateral hydron on imaging) and bladder decompressed with SPT in addition to patient's clinical presentation. Urology requested to exchange SPT in the setting of klebsiella UTI.    Procedure: The patient was prepped and draped in the usual sterile fashion. The existing SPT was prepped into the field. The balloon was taken down and the existing tube removed. A well lubricated 16Fr kent catheter was placed. 10 ml sterile water was placed in the balloon. The catheter flushed with ease. It was secured to the thigh with a stat lock. The patient tolerated the procedure well.          Recommendations:   16-Australian suprapubic tube exchange at bedside without complication  Patient may proceed with monthly exchanges in outpatient setting   Continue culture directed antibiotics for treatment of UTI  Urology will sign off please reach out with questions or concerns          History of Present Illness:   CC: SPT Exchange      History is obtained from the patient    lAis Hartman is a 69 year old  female with PMH of uterine carcinoma s/p Ex-lap SNEHA/BSO/EDIE c/b cystotomy in 07/2024 s/p complex bladder closure with omental flap she had a suprapubic tube placed at that time and post-operatively prefers drainage with suprapubic tube. She presented to the emergency department yesterday with shortness of breath, increased LE edema, and confusion. She reports she noted her urine appeared cloudy and dysuria but denies frequency, urgency, and flank pain. She reports her SPT has been draining well.     Patient denies any: gross hematuria,  nausea/vomiting,  subjective fevers/chills,  frequency            Past Medical History:     Past Medical History:   Diagnosis Date    Atrial fibrillation (H)     Depression     Hypertension     Malignant neoplasm of endocervix (H)     Tx with radiation    Orthopnea 9/21/2024    Other chronic pain     Low back    Schizophrenia (H)     Urinary incontinence              Past Surgical History:     Past Surgical History:   Procedure Laterality Date    ABDOMINOPLASTY      BIOPSY CERVICAL, LOCAL EXCISION, SINGLE/MULTIPLE  10/26/2011    Procedure:BIOPSY CERVICAL, LOCAL EXCISION, SINGLE/MULTIPLE; EUA, cervical biopsies; Surgeon:BETTY TINEO; Location:[facility] OR    BIOPSY VAGINAL N/A 6/8/2021    Procedure: BIOPSY, VAGINA;  Surgeon: Dany Perez MD;  Location: [facility] OR    CYSTOSCOPY N/A 5/17/2024    Procedure: Cystoscopy;  Surgeon: Dany Perez MD;  [facility]: [facility]    CYSTOSTOMY, INSERT TUBE SUPRAPUBIC, COMBINED  7/12/2024    Procedure: Insertion of supra-pubic catheter;  Surgeon: Dany Perez MD;  [facility]: [facility]    DILATION AND CURETTAGE, WITH ULTRASOUND GUIDANCE N/A 5/17/2024    Procedure: Dilation and curettage of the uterus with drainage of uterine fluid under ultrasound guidance, lysis of vaginal adhesions;  Surgeon: Dany Perez MD;  Location: [facility]    EXAM UNDER ANESTHESIA PELVIC N/A 3/12/2020    Procedure: Exam under anesthsia, vaginal biopsies, possible CO2 laser of the  vagina;  Surgeon: Lilliam Roy MD;  [facility]    GI SURGERY  2004    gastric bypass    HYSTERECTOMY TOTAL ABDOMINAL, BILATERAL SALPINGO-OOPHORECTOMY, COMBINED Bilateral 7/12/2024    Procedure: Diagnostic laparoscopy converted to exploratory laparotomy, total abdominal hysterectomy, bilateral salpingo-oophorectomy, lysis of adhesions >60 min;  Surgeon: Dany Perez MD;  [facility]: [facility]    INSERT PORT VASCULAR ACCESS N/A 8/26/2024    Procedure: Insert port vascular access;  Surgeon: Avery Gregory MD;  Location: [facility]    IR CHEST PORT PLACEMENT > 5 YRS OF AGE  8/26/2024    LASER CO2 VAGINA N/A 3/2/2015    Procedure: LASER CO2 VAGINA;  Surgeon: Mariela Abdalla MD;  Location: [facility]    LASER CO2 VAGINA N/A 5/24/2019    Procedure: Exam under anesthesia, vaginal biopsies, CO2 laser of the vagina;  Surgeon: Lilliam Roy MD;  Location: [facility]    LASER CO2 VAGINA N/A 6/8/2021    Procedure: Exam under anesthesia, laser ablation of the upper vagina;  Surgeon: Dany Perez MD;  Location: [facility]    REPAIR BLADDER N/A 7/12/2024    Procedure: Repair bladder;  Surgeon: Dany Perez MD;  [facility]: [facility]             Social History:     Social History     Socioeconomic History    Marital status: Single     Spouse name: Not on file    Number of children: Not on file    Years of education: Not on file    Highest education level: Not on file   Occupational History    Not on file   Tobacco Use    Smoking status: Never    Smokeless tobacco: Never   Substance and Sexual Activity    Alcohol use: Not Currently    Drug use: No    Sexual activity: Not on file   Other Topics Concern    Parent/sibling w/ CABG, MI or angioplasty before 65F 55M? Not Asked   Social History Narrative    Not on file     Social Determinants of Health     Financial Resource Strain: Low Risk  (9/21/2024)    Financial Resource Strain     Within the past 12 months, have you or your family members you live with been unable  to get utilities (heat, electricity) when it was really needed?: No   Food Insecurity: Low Risk  (9/21/2024)    Food Insecurity     Within the past 12 months, did you worry that your food would run out before you got money to buy more?: No     Within the past 12 months, did the food you bought just not last and you didn t have money to get more?: No   Transportation Needs: High Risk (9/21/2024)    Transportation Needs     Within the past 12 months, has lack of transportation kept you from medical appointments, getting your medicines, non-medical meetings or appointments, work, or from getting things that you need?: Yes   Physical Activity: Not on file   Stress: Not on file   Social Connections: Not on file   Interpersonal Safety: Low Risk  (9/21/2024)    Interpersonal Safety     Do you feel physically and emotionally safe where you currently live?: Yes     Within the past 12 months, have you been hit, slapped, kicked or otherwise physically hurt by someone?: No     Within the past 12 months, have you been humiliated or emotionally abused in other ways by your partner or ex-partner?: No   Housing Stability: Low Risk  (9/21/2024)    Housing Stability     Do you have housing? : Yes     Are you worried about losing your housing?: No             Family History:     Family History   Problem Relation Age of Onset    Heart Disease Father     Heart Failure Father     No Known Problems Brother     No Known Problems Brother     No Known Problems Brother     Pancreatitis Brother     Hypertension Sister     Peripheral Vascular Disease Sister     No Known Problems Sister     No Known Problems Son     No Known Problems Daughter              Allergies:      Allergies   Allergen Reactions    Ibuprofen Nausea and Vomiting    Shrimp     Sulfa Antibiotics Rash     Patient started on bactrim 7/24/24 tolerating medication without rash on 7/25              Medications:     Current Facility-Administered Medications   Medication Dose Route  Frequency Provider Last Rate Last Admin    acetaminophen (TYLENOL) tablet 650 mg  650 mg Oral Q4H PRN Radha Fischer MD   650 mg at 09/22/24 0259    Or    acetaminophen (TYLENOL) Suppository 650 mg  650 mg Rectal Q4H PRN Radha Fischer MD        benzocaine-menthol (CHLORASEPTIC MAX) 15-10 MG lozenge 1 lozenge  1 lozenge Buccal Q1H PRN Radha Fischer MD        calcium carbonate (TUMS) chewable tablet 1,000 mg  1,000 mg Oral 4x Daily PRN Radha Fischer MD   1,000 mg at 09/22/24 0256    lidocaine (LMX4) cream   Topical Q1H PRN Radha Fischer MD        lidocaine 1 % 0.1-1 mL  0.1-1 mL Other Q1H PRN Radha Fischer MD        melatonin tablet 3 mg  3 mg Oral At Bedtime PRN Zeferino Ornelas MD        meropenem (MERREM) 500 mg vial to attach to  mL bag for ADULTS or 25 mL bag for PEDS  500 mg Intravenous Q12H Rocio Hwang  mL/hr at 09/22/24 0645 500 mg at 09/22/24 0645    naloxone (NARCAN) injection 0.2 mg  0.2 mg Intravenous Q2 Min PRN Rocio Hwang MD        Or    naloxone (NARCAN) injection 0.4 mg  0.4 mg Intravenous Q2 Min PRN Rocio Hwang MD        Or    naloxone (NARCAN) injection 0.2 mg  0.2 mg Intramuscular Q2 Min PRN Rocio Hwang MD        Or    naloxone (NARCAN) injection 0.4 mg  0.4 mg Intramuscular Q2 Min PRN Rocio Hwang MD        oxyCODONE (ROXICODONE) tablet 5 mg  5 mg Oral Q6H PRN Zeferino Ornelas MD   5 mg at 09/22/24 0109    polyethylene glycol (MIRALAX) Packet 17 g  17 g Oral BID PRN Radha Fischer MD        senna-docusate (SENOKOT-S/PERICOLACE) 8.6-50 MG per tablet 1 tablet  1 tablet Oral BID PRRadha Naqvi MD        Or    senna-docusate (SENOKOT-S/PERICOLACE) 8.6-50 MG per tablet 2 tablet  2 tablet Oral BID PRRadha Naqvi MD        sodium chloride (PF) 0.9% PF flush 3 mL  3 mL Intracatheter Q8H Radha Fischer MD   3 mL at 09/21/24 0821    sodium chloride  (PF) 0.9% PF flush 3 mL  3 mL Intracatheter q1 min Radha Madsen MD                 Review of Systems:      All other review of systems negative, except for what is mentioned above        Physical Exam:   /85   Pulse 94   Temp 97.6  F (36.4  C) (Oral)   Resp 14   Wt 62.4 kg (137 lb 8 oz)   SpO2 95%   BMI 24.36 kg/m    GEN: NAD, lying in bed, responds appropriately to questions  HEENT: atraumatic, mucosa moist  CVS: Extremities appear well perfused   RESP: Non-labored breathing on room air, no use of accessory muscles of respiration  ABD: soft, nontender, nondistended  : SPT in place draining clear yellow urine   EXT: Extremities atraumatic, grossly normal range of motion         Data:     Recent Labs   Lab Test 09/22/24  0325 09/21/24  0903 09/21/24  0004   WBC 6.8 5.7 6.4   HGB 8.4* 9.1* 9.6*   CR 1.96* 2.10* 2.14*         US Renal Complete Non-Vascular 09/21/2024    Narrative  EXAMINATION: US RENAL COMPLETE NON-VASCULAR, 9/21/2024 3:13 AM    COMPARISON: Renal ultrasound 7/14/2024, CTAP 7/21/2024    HISTORY: MADHU, please evaluate kidneys    TECHNIQUE: The kidneys and bladder were scanned in the standard  fashion with specialized ultrasound transducer(s) using both gray  scale and limited color/spectral Doppler techniques.    FINDINGS:    Right kidney: Measures 8.9 cm in length. Parenchyma is of normal  thickness and echogenicity. No focal mass. Right pelviectasis and  proximal ureteral dilation measuring up to 2.0 cm.    Left kidney: Measures 9.5 cm in length. Parenchyma is of normal  thickness and echogenicity. No focal mass. No hydronephrosis.    Bladder: Collapsed bladder with poorly visualized Shahid catheter in  place.    Impression  IMPRESSION:  Mild right hydroureteronephrosis. No left hydronephrosis.    I have personally reviewed the examination and initial interpretation  and I agree with the findings.    SORAYA STARKEY MD      SYSTEM ID:  [id number]           Discussed with staff  surgeon Dr. Ballesteros, who agrees with the assessment and plans    Ella Linda MD   PGY-2 Urology  [facility]

## 2024-09-22 NOTE — PROGRESS NOTES
[facility]    Medicine Progress Note - Hospitalist Service, GOLD TEAM 9    Date of Admission:  9/20/2024    Assessment & Plan   69-year-old female who presented to the ED due to concern for UTI and confusion.  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    #Catheter-associated UTI  #ESBL K pneumoniae bacteremia  #Hx of Klebsiella pna ESBL UTI  #Mild right hydroureteronephrosis   #Recent suprapubic cath placement - 8/2024- s/p exchange 9/22  #S/p partial cystotomy with complex bladder closure and omental flap- 7/2024  - Blood cx growing GNR  - Follow up on urine cx  - C/w meropenem and de-escalate as needed  - appreciate urology for their help with SPT exchange  - No leukocytosis, fever, lactic acidosis, no CVAT, no flank pain, low suspicion for perinephric abscess, will hold on CT w/con given new MADHU    #B/l LE edema  #Anasarca  #Hypoalbuminemia  #MADHU on CKD stage II (baseline ~ 0.9)- likely ATN from bacteremia  #Hypotonic hyponatremia- asymptomatic- hypervolemic  #Hypokalemia  - Strict I/O  - Follow up on repeat TTE report  - Ordered 24 hr urine protein, HIV, C3, C4, hepatitis panel, lipid panel, A1c, BÁRBARA, serum free light chain, AM cortisol  - TSH wnl   - Patient not symptomatic with hyponatremia, likely >48hr, no indication for hypertonic saline  - Nephro consulted, appreciate their recs    #Anemia  #Thrombocytopenia  - Plt wnl 2 weeks ago and now low at 40-50K, she has been started on her Taxol/Carbo infusions and this could be BM suppression from chemo, will monitor, no e/o bleeding so far  - Will get MT labs    Chronic conditions:  # History of REMA-III, s/p laser therapy  #  History of cevical cancer, s/p radiation therapy  # History of gastric bypass   # Atrial fibrillation - on home Eliquis, resumed low dose given low GFR  # Hypertension   # Chronic pain- on home oxycodone per OP pain specialist- resumed PTA oxycodone 15 q6hr PRN  # Osteoarthritis  #Paranoid schizophrenia- pt not taking meds  #RAFAEL- pt not taking meds        Diet: Renal Diet (non-dialysis)    DVT Prophylaxis: Pneumatic Compression Devices  Shahid Catheter: Not present  Lines: PRESENT      Port a Cath 08/26/24 Single Lumen Right Chest wall-Site Assessment: WDL      Cardiac Monitoring: ACTIVE order. Indication: Acute decompensated heart failure (48 hours)  Code Status: Full Code            Diet: Renal Diet (non-dialysis)    DVT Prophylaxis: Pneumatic Compression Devices  Shahid Catheter: Not present  Lines: PRESENT      Port a Cath 08/26/24 Single Lumen Right Chest wall-Site Assessment: WDL      Cardiac Monitoring: ACTIVE order. Indication: Acute decompensated heart failure (48 hours)  Code Status: Full Code      Clinically Significant Risk Factors        # Hypokalemia: Lowest K = 3 mmol/L in last 2 days, will replace as needed  # Hyponatremia: Lowest Na = 123 mmol/L in last 2 days, will monitor as appropriate    # Hypomagnesemia: Lowest Mg = 1.1 mg/dL in last 2 days, will replace as needed   # Hypoalbuminemia: Lowest albumin = 2.3 g/dL at 9/22/2024  3:25 AM, will monitor as appropriate   # Thrombocytopenia: Lowest platelets = 47 in last 2 days, will monitor for bleeding  # Acute Kidney Injury, unspecified: based on a >150% or 0.3 mg/dL increase in last creatinine compared to past 90 day average, will monitor renal function  # Hypertension: Noted on problem list               # Financial/Environmental Concerns:           Disposition Plan     Medically Ready for Discharge: Anticipated in 2-4 Days             Rocio Hwang MD  Hospitalist Service, GOLD TEAM [address] [place] [address]  Center  Securely message with [employer] (more info)  Text page via [facility] Paging/Directory   See signed in provider for up to date coverage information  ______________________________________________________________________    Interval History   No major event ON, patient doing well this am, she had her SPT exchanged this morning, feels her LE edema is slightly improved, no major concern.     Physical Exam   Vital Signs: Temp: 97.6  F (36.4  C) Temp src: Oral BP: 116/85 Pulse: 94   Resp: 14 SpO2: 95 % O2 Device: None (Room air)    Weight: 137 lbs 8 oz    Gen: A&Ox4, lying comfortably on bed, in no distress, doing well  Lung: clear on ausculation b/l, no wheezing/crackle  Heart: no rub/murmur/gallop  Abd: soft, non-tender, non-distended  Ext: 3+ edema on LE up to pelvic area b/l, warm, no skin lesion    Medical Decision Making       50 MINUTES SPENT BY ME on the date of service doing chart review, history, exam, documentation & further activities per the note.      Data     I have personally reviewed the following data over the past 24 hrs:    6.8  \   8.4 (L)   / 47 (LL)     124 (L); 123 (L) 92 (L) 22.8 /  91   4.0; 4.0 20 (L) 1.96 (H) \     ALT: 8 AST: 11 AP: 76 TBILI: 0.3   ALB: 2.3 (L) TOT PROTEIN: 5.1 (L) LIPASE: N/A     TSH: N/A T4: N/A A1C: N/A     Procal: N/A CRP: N/A Lactic Acid: N/A       INR:  N/A PTT:  26   D-dimer:  N/A Fibrinogen:  N/A       Imaging results reviewed over the past 24 hrs:   Recent Results (from the past 24 hour(s))   US Lower Extremity Venous Duplex Bilateral    Impression    RESIDENT PRELIMINARY INTERPRETATION  IMPRESSION:  1.  No evidence of deep venous thrombosis in either lower extremity.

## 2024-09-22 NOTE — PLAN OF CARE
Goal Outcome Evaluation:  A&Ox4. RA. Elevated HR. Telemetry. SBA. Denied nausea. Pain somewhat tolerated w/ tylenol and oxycodone. Tums given x 1. Pt reported pain abdominal cramps - provider paged. Passing gas. Diarrhea x 2. Suprapubic cath - rahel cloudy urine, 24 hour urine collection started at 2200, low urine output. WOC consulted for multiple wounds - coccyx wound noted. Redness noted on BL calf - pt reported itchiness. BLE Edema. Port - infusing TKO.     Plan of Care Reviewed With: patient    Overall Patient Progress: no changeOverall Patient Progress: no change    Outcome Evaluation: pt reported painful abdominal cramps. SBA. RA. Neuro intact.

## 2024-09-22 NOTE — PLAN OF CARE
Goal Outcome Evaluation:      Plan of Care Reviewed With: patient      Time    BP (!) 142/97 (BP Location: Right arm)   Pulse 92   Temp 97.2  F (36.2  C) (Oral)   Resp 18   SpO2 100%     Activity: Assist of 1  Pain: Denies  Neuro: Alert and oriented x 4  Cardiac: Tachycardic, history of Afib. On tele monitoring.  Respiratory: WDL  GI/: Has suprapubic catheter, BM x 1 today 9/21/2024  Diet: Reg  Lines: Port  Wounds: Wound on lower right abdomen, upper right thigh, buttocks. Wound cares done, dressings changed.       New changes this shift: Antibiotics started. Infectious workup ongoing.         No acute events this shift, continue with care as planned.

## 2024-09-22 NOTE — PLAN OF CARE
Goal Outcome Evaluation:      Plan of Care Reviewed With: patient      Time    /84   Pulse 91   Temp 97.6  F (36.4  C) (Oral)   Resp 16   Wt 62.4 kg (137 lb 8 oz)   SpO2 95%   BMI 24.36 kg/m      Activity: Assist of 1  Pain: Managed by PRN oxycodone  Neuro: Alert and oriented x 4  Cardiac: Tachycardia, history of Afib  Respiratory: WDL  GI/: BM x 1, suprapubic catheter.  Diet: Renal diet  Lines: Port needle changed, next due to be changed on 9/29/2024  Wounds: R thigh, buttocks, R elbow      New changes this shift: No new changes this shift         No acute events this shift, continue with care as planned.         10.

## 2024-09-22 NOTE — CONSULTS
Care Management Initial Consult    General Information  Assessment completed with: Patient, VM-chart review, Pt Aranza and daughter Joy  Type of CM/SW Visit: Initial Assessment    Primary Care Provider verified and updated as needed: Yes (Maria Fernanda Eckert [phone], [place] [address])   Readmission within the last 30 days: no previous admission in last 30 days      Reason for Consult: discharge planning (Elevated Risk Score)  Advance Care Planning: Advance Care Planning Reviewed: concerns discussed        Communication Assessment  Patient's communication style: spoken language (English or Bilingual)    Hearing Difficulty or Deaf: no   Wear Glasses or Blind: no    Cognitive  Cognitive/Neuro/Behavioral: WDL     Arousal Level: opens eyes spontaneously  Orientation: oriented x 4             Living Environment:   People in home: grandchild(vernell), child(vernell), adult     Current living Arrangements: house      Able to return to prior arrangements: yes     Family/Social Support:  Care provided by: child(vernell)  Provides care for: no one  Marital Status: Single  Support system: Children (Grandchildren)          Description of Support System: Supportive, Involved       Current Resources:   Patient receiving home care services: No      Community Resources: County Worker  Equipment currently used at home: walker, standard  Supplies currently used at home: None    Employment/Financial:  Employment Status: retired        Financial Concerns: none      Does the patient's insurance plan have a 3 day qualifying hospital stay waiver?  No    Lifestyle & Psychosocial Needs:  Social Determinants of Health     Food Insecurity: Low Risk  (9/21/2024)    Food Insecurity     Within the past 12 months, did you worry that your food would run out before you got money to buy more?: No     Within the past 12 months, did the food you bought just not last and you didn t have money to get more?: No   Depression: Not at risk  (9/4/2024)    PHQ-2     PHQ-2 Score: 0   Housing Stability: Low Risk  (9/21/2024)    Housing Stability     Do you have housing? : Yes     Are you worried about losing your housing?: No   Tobacco Use: Low Risk  (9/4/2024)    Patient History     Smoking Tobacco Use: Never     Smokeless Tobacco Use: Never     Passive Exposure: Not on file   Financial Resource Strain: Low Risk  (9/21/2024)    Financial Resource Strain     Within the past 12 months, have you or your family members you live with been unable to get utilities (heat, electricity) when it was really needed?: No   Alcohol Use: Not on file   Transportation Needs: High Risk (9/21/2024)    Transportation Needs     Within the past 12 months, has lack of transportation kept you from medical appointments, getting your medicines, non-medical meetings or appointments, work, or from getting things that you need?: Yes   Physical Activity: Not on file   Interpersonal Safety: Low Risk  (9/21/2024)    Interpersonal Safety     Do you feel physically and emotionally safe where you currently live?: Yes     Within the past 12 months, have you been hit, slapped, kicked or otherwise physically hurt by someone?: No     Within the past 12 months, have you been humiliated or emotionally abused in other ways by your partner or ex-partner?: No   Stress: Not on file   Social Connections: Not on file   Health Literacy: Not on file     Functional Status:  Prior to admission patient needed assistance:   Dependent ADLs:: Ambulation-walker  Dependent IADLs:: Cleaning, Cooking, Laundry, Transportation, Meal Preparation     Mental Health Status:  Mental Health Status: No Current Concerns       Chemical Dependency Status:  Chemical Dependency Status: No Current Concerns           Values/Beliefs:  Spiritual, Cultural Beliefs, [employer] Practices, Values that affect care: no             Discussed  Partnership in Safe Discharge Planning  document with patient/family: Yes  Additional  "Information:    Backgrounds: \"Alis Hartman is a 69 year old female who has a 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   Per daughter, patient has been on diuretics in the past but recently taken off. Noted increased swelling in the legs and some orthopnea. No O2 at home. Suprapubic catheter in place. Patient alert and oriented x4.\" (Rocio Hwang MD)    Care management assessment completed at the bedside with the pt,Aranza, d/t elevated re-admission risk score. RNCC introduced self and explained the nature of the care management assessment. Pt agreed to speak with RNCC. The pt verified address, PCP, and health insurance in chart is current.     Aranza lives at home with daughter and grandchildren. Daughter Joy and pt both confirmed that pt has 7 hrs/daily for PCA provider by her grandchildren. Aranza received home RN and PT via [facility]. She received disability. She use walker intermittently when ambulating. Pt/daughter have no concerns. Joy will pick her up at discharge.    Aranza would like a wheel chair. Writer messaged the hospitalist Rocio Schuler to put in the order. She replied she will do.     Next Steps: Follow up with provide [facility] order and set up for the pt. Update [facility] at discharge.     Discharge Resource:    [employer]   P: [phone]  F: [phone]   Established home RN, PT    Augustina Barahona RN    7C RN Coordinator  Phone: [phone]  9/22/2024  Units: 7C Med Surg 7401 thru 3018 RNCC     Social Work and Care Management Department   SEARCHABLE in [employer] - search CARE COORDINATOR   [place] ([phone]) Saturday & Sunday; ([phone]) [place] Recognized Holidays   Units: 5A Onc 9539 - 7726 RNCC,  5A Onc 5220 thru 7493 " RNCC, 5C OFFSERVICE [phone] RNCC & 5C OFF SERVICE [phone] RNCC   Units: 6B Vocera, 6C Card 6401 thru 6420 RNCC, 6C Card 6502 thru 6514 RNCC & 6C Card 6515 thru 6519 RNCC    Units: 7A SOT RNCC Vocera, 7B Med Surg Vocera, & 7C Med Surg 7502 thru 0921 RNCC   Units: 6A Vocera & 4A CVICU Vocera, 4C MICU Vocera, and 4E SICU Vocera     Units: 5 Ortho Vocera & 5 Med Surg Vocera    Units: 6 Med Surg Vocera & 8 Med Surg Vocera

## 2024-09-23 ENCOUNTER — APPOINTMENT (OUTPATIENT)
Dept: CARDIOLOGY | Facility: CLINIC | Age: 69
End: 2024-09-23
Attending: STUDENT IN AN ORGANIZED HEALTH CARE EDUCATION/TRAINING PROGRAM
Payer: COMMERCIAL

## 2024-09-23 LAB
ALBUMIN SERPL BCG-MCNC: 2.2 G/DL (ref 3.5–5.2)
ALBUMIN SERPL ELPH-MCNC: 1.9 G/DL (ref 3.7–5.1)
ALP SERPL-CCNC: 73 U/L (ref 40–150)
ALPHA1 GLOB SERPL ELPH-MCNC: 0.7 G/DL (ref 0.2–0.4)
ALPHA2 GLOB SERPL ELPH-MCNC: 0.8 G/DL (ref 0.5–0.9)
ALT SERPL W P-5'-P-CCNC: 7 U/L (ref 0–50)
ANA SER QL IF: NEGATIVE
ANION GAP SERPL CALCULATED.3IONS-SCNC: 10 MMOL/L (ref 7–15)
AST SERPL W P-5'-P-CCNC: 11 U/L (ref 0–45)
B-GLOBULIN SERPL ELPH-MCNC: 0.5 G/DL (ref 0.6–1)
BACTERIA BLD CULT: ABNORMAL
BACTERIA BLD CULT: ABNORMAL
BILIRUB SERPL-MCNC: 0.2 MG/DL
BUN SERPL-MCNC: 21.4 MG/DL (ref 8–23)
C3 SERPL-MCNC: 94 MG/DL (ref 81–157)
C4 SERPL-MCNC: 39 MG/DL (ref 13–39)
CALCIUM SERPL-MCNC: 7.7 MG/DL (ref 8.8–10.4)
CHLORIDE SERPL-SCNC: 98 MMOL/L (ref 98–107)
CREAT SERPL-MCNC: 1.52 MG/DL (ref 0.51–0.95)
EGFRCR SERPLBLD CKD-EPI 2021: 36 ML/MIN/1.73M2
ERYTHROCYTE [DISTWIDTH] IN BLOOD BY AUTOMATED COUNT: 21.2 % (ref 10–15)
GAMMA GLOB SERPL ELPH-MCNC: 0.7 G/DL (ref 0.7–1.6)
GLUCOSE SERPL-MCNC: 91 MG/DL (ref 70–99)
HCO3 SERPL-SCNC: 20 MMOL/L (ref 22–29)
HCT VFR BLD AUTO: 22.7 % (ref 35–47)
HGB BLD-MCNC: 7.6 G/DL (ref 11.7–15.7)
KAPPA LC FREE SER-MCNC: 4.31 MG/DL (ref 0.33–1.94)
KAPPA LC FREE/LAMBDA FREE SER NEPH: 1.65 {RATIO} (ref 0.26–1.65)
LAMBDA LC FREE SERPL-MCNC: 2.62 MG/DL (ref 0.57–2.63)
LOCATION OF TASK: ABNORMAL
LVEF ECHO: NORMAL
M PROTEIN SERPL ELPH-MCNC: 0.1 G/DL
MAGNESIUM SERPL-MCNC: 1.6 MG/DL (ref 1.7–2.3)
MCH RBC QN AUTO: 28.1 PG (ref 26.5–33)
MCHC RBC AUTO-ENTMCNC: 33.5 G/DL (ref 31.5–36.5)
MCV RBC AUTO: 84 FL (ref 78–100)
PLATELET # BLD AUTO: 43 10E3/UL (ref 150–450)
POTASSIUM SERPL-SCNC: 3.5 MMOL/L (ref 3.4–5.3)
POTASSIUM SERPL-SCNC: 3.5 MMOL/L (ref 3.4–5.3)
PROT PATTERN SERPL ELPH-IMP: ABNORMAL
PROT SERPL-MCNC: 4.7 G/DL (ref 6.4–8.3)
RBC # BLD AUTO: 2.7 10E6/UL (ref 3.8–5.2)
SODIUM SERPL-SCNC: 128 MMOL/L (ref 135–145)
SODIUM SERPL-SCNC: 128 MMOL/L (ref 135–145)
SODIUM SERPL-SCNC: 130 MMOL/L (ref 135–145)
TOTAL PROTEIN SERUM FOR ELP: 4.7 G/DL (ref 6.4–8.3)
WBC # BLD AUTO: 6.7 10E3/UL (ref 4–11)

## 2024-09-23 PROCEDURE — 250N000011 HC RX IP 250 OP 636: Performed by: STUDENT IN AN ORGANIZED HEALTH CARE EDUCATION/TRAINING PROGRAM

## 2024-09-23 PROCEDURE — 250N000013 HC RX MED GY IP 250 OP 250 PS 637: Performed by: STUDENT IN AN ORGANIZED HEALTH CARE EDUCATION/TRAINING PROGRAM

## 2024-09-23 PROCEDURE — 250N000013 HC RX MED GY IP 250 OP 250 PS 637: Performed by: INTERNAL MEDICINE

## 2024-09-23 PROCEDURE — 84132 ASSAY OF SERUM POTASSIUM: CPT | Performed by: STUDENT IN AN ORGANIZED HEALTH CARE EDUCATION/TRAINING PROGRAM

## 2024-09-23 PROCEDURE — 93306 TTE W/DOPPLER COMPLETE: CPT

## 2024-09-23 PROCEDURE — 250N000013 HC RX MED GY IP 250 OP 250 PS 637

## 2024-09-23 PROCEDURE — 120N000002 HC R&B MED SURG/OB UMMC

## 2024-09-23 PROCEDURE — 99233 SBSQ HOSP IP/OBS HIGH 50: CPT | Mod: 24 | Performed by: INTERNAL MEDICINE

## 2024-09-23 PROCEDURE — 83735 ASSAY OF MAGNESIUM: CPT | Performed by: STUDENT IN AN ORGANIZED HEALTH CARE EDUCATION/TRAINING PROGRAM

## 2024-09-23 PROCEDURE — G0463 HOSPITAL OUTPT CLINIC VISIT: HCPCS

## 2024-09-23 PROCEDURE — 84295 ASSAY OF SERUM SODIUM: CPT | Performed by: STUDENT IN AN ORGANIZED HEALTH CARE EDUCATION/TRAINING PROGRAM

## 2024-09-23 PROCEDURE — 99232 SBSQ HOSP IP/OBS MODERATE 35: CPT | Performed by: STUDENT IN AN ORGANIZED HEALTH CARE EDUCATION/TRAINING PROGRAM

## 2024-09-23 PROCEDURE — 85014 HEMATOCRIT: CPT | Performed by: STUDENT IN AN ORGANIZED HEALTH CARE EDUCATION/TRAINING PROGRAM

## 2024-09-23 PROCEDURE — 93306 TTE W/DOPPLER COMPLETE: CPT | Mod: 26 | Performed by: INTERNAL MEDICINE

## 2024-09-23 RX ORDER — ERTAPENEM 1 G/1
1 INJECTION, POWDER, LYOPHILIZED, FOR SOLUTION INTRAMUSCULAR; INTRAVENOUS EVERY 24 HOURS
Status: DISCONTINUED | OUTPATIENT
Start: 2024-09-24 | End: 2024-09-27 | Stop reason: HOSPADM

## 2024-09-23 RX ADMIN — OXYCODONE HYDROCHLORIDE 15 MG: 5 TABLET ORAL at 20:51

## 2024-09-23 RX ADMIN — DICLOFENAC SODIUM 2 G: 10 GEL TOPICAL at 14:30

## 2024-09-23 RX ADMIN — DICLOFENAC SODIUM 2 G: 10 GEL TOPICAL at 11:10

## 2024-09-23 RX ADMIN — ACETAMINOPHEN 650 MG: 325 TABLET ORAL at 23:57

## 2024-09-23 RX ADMIN — OXYCODONE HYDROCHLORIDE 15 MG: 5 TABLET ORAL at 00:16

## 2024-09-23 RX ADMIN — MEROPENEM 500 MG: 500 INJECTION, POWDER, FOR SOLUTION INTRAVENOUS at 18:45

## 2024-09-23 RX ADMIN — OXYCODONE HYDROCHLORIDE 15 MG: 5 TABLET ORAL at 08:17

## 2024-09-23 RX ADMIN — OXYCODONE HYDROCHLORIDE 15 MG: 5 TABLET ORAL at 14:28

## 2024-09-23 RX ADMIN — ACETAMINOPHEN 650 MG: 325 TABLET ORAL at 00:16

## 2024-09-23 RX ADMIN — ACETAMINOPHEN 650 MG: 325 TABLET ORAL at 18:45

## 2024-09-23 RX ADMIN — MEROPENEM 500 MG: 500 INJECTION, POWDER, FOR SOLUTION INTRAVENOUS at 06:29

## 2024-09-23 RX ADMIN — DICLOFENAC SODIUM 2 G: 10 GEL TOPICAL at 18:48

## 2024-09-23 RX ADMIN — ACETAMINOPHEN 650 MG: 325 TABLET ORAL at 08:17

## 2024-09-23 ASSESSMENT — ACTIVITIES OF DAILY LIVING (ADL)
ADLS_ACUITY_SCORE: 37

## 2024-09-23 NOTE — PROVIDER NOTIFICATION
0106  Can you please clarify her oxycodone PRN orders? She has two PRN orders for 15 mg of oxycodone q6H and 5mg of oxycodone q6H. Is she supposed to have that oxycodone 5 mg ?  Vee Arriaga Paged via [employer]

## 2024-09-23 NOTE — PROGRESS NOTES
Care Management Follow Up    Length of Stay (days): 2  Expected Discharge Date: undetermined per provider  Concerns to be Addressed: discharge planning     Patient plan of care discussed at interdisciplinary rounds: Yes    Anticipated Discharge Disposition: Home    Anticipated Discharge Services:   PCA (7 hr/day) - resume    County Worker - resume    Home Care - resume RN/PT- ordered, accepted  [facility]  P: [phone]  F: [phone]    Anticipated Discharge DME: new: Wheelchair - referral/order sent to [facility]     Patient/family educated on Medicare website which has current facility and service quality ratings: yes  Education Provided on the Discharge Plan: Yes  Patient/Family in Agreement with the Plan: yes    Referrals Placed by CM/SW: Internal Clinic Care Coordination, Homecare, Durable Medical Equipment (DME)  Private pay costs discussed: Per [employer]    Discussed  Partnership in Safe Discharge Planning  document with patient/family: No     Handoff Completed: Yes, [facility] PCP: Internal handoff referral completed    Additional Information:  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 Currently on IV AB. Per notes, plan for monthly OP suprapubic tube exchange.    Per Dr. Hwang patient is not medically stable for discharge. W/C requested by pt and daughter on 9/22. MD placed [facility] order.     Referral sent to [facility] today with request for Dr. JOHNSTON to sign electronic order.     Message sent to [facility] via [employer] who confirmed pt is on service with them for RN/PT. Resumption ordered.    Next Steps:   [] Follow up on W/C order with [employer]  [] Send AVS to [employer] [facility] at discharge      Jaylin Villar RNCC  Care Management Department   Covering 5A RNCC  Available by [employer]

## 2024-09-23 NOTE — PLAN OF CARE
Goal Outcome Evaluation:  A&Ox3 - disoriented to time. Forgetful. RA. Elevated HR. Telemetry. SBA. Renal diet. Denied nausea. Pain somewhat tolerated w/ tylenol and oxycodone.  Passing gas. No BM. Suprapubic cath - rahel cloudy urine, low urine output. Skin tear on: R elbow, R thigh, and new skin tear R calf from pt itching the area. Coccyx/buttock wound. Redness and itchiness on BL calf. BLE edema. Port - needle/dressing changed yesterday, infusing TKO.       Plan of Care Reviewed With: patient    Overall Patient Progress: no changeOverall Patient Progress: no change    Outcome Evaluation: pt reported painful abdominal cramps. SBA. RA. Neuro intact.

## 2024-09-23 NOTE — PLAN OF CARE
Goal Outcome Evaluation:      Plan of Care Reviewed With: patient    Overall Patient Progress: no changeOverall Patient Progress: no change    [id number]:  A&O x4.  Forgetful at times.  Afebrile.  OVSS on room air.  C/o abdominal pain, given 15 mg PRN PO oxycodone x 2 and PRN PO Tylenol x 1 with a decrease in pain.  PRN Voltaren gel applied to knees and lower legs.  CBC collected and CMP add-on labs.  Fair PO intake.  Tolerating renal diet, ate food brought from home.  On tele.  No BM this shift, passing gas.  Skin tears on R elbow, R thigh and R calf.  Wound on coccyx/buttocks.  BLE edema.  Port TKO.  Encouraged to shift weight.  Family visiting.  Na 128 MD aware.  No need for electrolyte replacement per protocol.

## 2024-09-23 NOTE — PROVIDER NOTIFICATION
"    Gold 9 Rocio Hwang MD messaged via [employer]:  Noticed acute hepatitis panel was ordered on 9/21.  Does it still need to be collected?  Also do you plan to put in PT and lymphedema consults.  Thanks      Response:  \"If it was done before then not needed, I will put the consult thank you!!\".  "

## 2024-09-23 NOTE — PROGRESS NOTES
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      Improving, non-oliguric MADHU   sCr on admit 2.14.  Baseline sCr 0.8  24h U protein 497mg, U Na 103  BÁRBARA negative, C3/C4 wnl, SPEP pending   Patient w/ acute on chronic anemia and thrombocytopenia, timing correlates w chemo but is concerning for a TMA in the correct context, that being said her sCr has been improving since admit w the treatment of her infection   Likely 2/2 ATN in the setting of bacteremia/infection.    Hyponatremia  Metabolic acidosis  2/2 above    Hypokalemia  present on admit, now resolved    ESBL K pneumoniae bacteremia   History of REMA-III, s/p laser therapy     Recommendations were communicated to primary team via note     Seen and discussed with Dr. Rachel Franklin MD   Division of Renal Disease and Hypertension  [facility]  [email or web address]  Vocera Web Console    SUBJECTIVE     Interval History :   Nursing and provider notes from last 24 hours reviewed.  In the last 24 hours Alis Hartman   Feeling well. Good appetite. Swelling improved. No concerns      A comprehensive review of systems was negative except as noted above.    OBJECTIVE     Physical Exam:   I/O last 3 completed shifts:  In: 1144 [P.O.:1044; I.V.:100]  Out: 630 [Urine:630]   /74 (BP Location: Left arm)   Pulse 89   Temp 97.5  F (36.4  C) (Oral)   Resp 16   Wt 62.4 kg (137 lb 8 oz)   SpO2 100%   BMI 24.36 kg/m       General:lying in  bed  HEENT:mmm  Pulmonary:unlabored  Abdominal:nondistended  Extremities:1+ b/l LE edema   Access:PIV    Labs:   All labs reviewed by me  Electrolytes/Renal -   Recent Labs   Lab Test 09/23/24 0305 09/22/24  1819 09/22/24 0325 09/21/24  1848 09/21/24  0903 09/21/24  0004 08/01/24  0554 07/31/24  0655 07/30/24  0551   *  128* 128* 123*  124*   < > 125* 125*   < > 141 142   POTASSIUM 3.5  3.5  --  4.0  4.0  --  3.2* 3.0*   < > 4.4 4.3   CHLORIDE 98  --  92*  --  92* 91*   < > 115* 113*   CO2 20*  --  20*  --  18* 16*   < > 19* 24   BUN 21.4  --  22.8  --  22.9 21.0   < > 10.4 9.6   CR 1.52*  --  1.96*  --  2.10* 2.14*   < > 1.20* 1.28*   GLC 91  --  91  --  85 100*   < > 75 78   SAMUEL 7.7*  --  8.0*  --  8.0* 8.0*   < > 7.7* 7.9*   MAG 1.6*  --  1.9  --   --  1.1*   < > 1.7 1.8   PHOS  --   --   --   --   --  3.0  --  2.6 3.1    < > = values in this interval not displayed.       CBC -   Recent Labs   Lab Test 09/23/24 0831 09/22/24 0325 09/21/24 0903   WBC 6.7 6.8 5.7   HGB 7.6* 8.4* 9.1*   PLT 43* 47* 48*       LFTs -   Recent Labs   Lab Test 09/23/24 0305 09/22/24 0325 09/21/24 0903   ALKPHOS 73 76 80   BILITOTAL 0.2 0.3 0.4   ALT 7 8 7   AST 11 11 16   PROTTOTAL 4.7* 5.1* 5.6*   ALBUMIN 2.2* 2.3* 2.4*       Iron Panel - No lab results found.    Current Medications:  Current Facility-Administered Medications   Medication Dose Route Frequency Provider Last Rate Last Admin    meropenem (MERREM) 500 mg vial to attach to  mL bag for ADULTS or 25 mL bag for PEDS  500 mg Intravenous Q12H Rocio Hwang  mL/hr at 09/23/24 0629 500 mg at 09/23/24 0629    sodium chloride (PF) 0.9% PF flush 3 mL  3 mL Intracatheter Q8H Radha Fischer MD   3 mL at 09/21/24 0821     Current Facility-Administered Medications   Medication Dose Route Frequency Provider Last Rate Last Admin     Robin Franklin MD

## 2024-09-23 NOTE — PLAN OF CARE
Goal Outcome Evaluation:      Plan of Care Reviewed With: patient    [id number]    Neuro: A&O x4, forgetful at times.   Behavior: calm and cooperative   Pain: abdominal pain managed with Tylenol and oxycodone.  Vital; Temp: 97.7  F (36.5  C) Temp src: Oral BP: 138/81 Pulse: 88   Resp: 16 SpO2: 100 % O2 Device: None (Room air)     GI/: suprapubic cath with adequate urine out. NO BM but passing gas. Denies nausea/vomit. Bowel sounds are audible    Skin: bilateral edema on legs. Skin tears on right calf, and thigh.  Sacral area care done today and a new mapalex applied. Port in place with good dressing.  Diet: renal diet. Ate and tolerated  Electrolyte:sodium lab done.     Afebrile, discontinued Tele-monitoring. Changed port cap. Applied Voltaren cream to knees and feet.

## 2024-09-23 NOTE — CONSULTS
[facility]  WO Nurse Inpatient Assessment     Consulted for: coccyx, pannus, forearm thigh     Patient History (according to provider note(s):      69-year-old female who presented to the ED due to concern for UTI and confusion. 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     Assessment:      Areas visualized during today's visit: Sacrum/coccyx, Lower extremities , Upper extremities , and Abdomen    Wound location: suprapubic site       Last photo: 9/23  Wound due to: Surgical Wound  Wound history/plan of care: in July had complex bladder closure with suprapubic placed, prior incision to pannus is healed   Wound base: 100 % [place],      Palpation of the wound bed: normal      Drainage: moderate     Description of drainage: cloudy     Measurements (length x width x depth, in cm): 1  x 2  x  0.2 cm      Tunneling: N/A     Undermining: N/A  Periwound skin: Intact      Color: normal and consistent with surrounding tissue      Temperature: normal   Odor: none  Pain: moderate, tender  Pain interventions prior to dressing change: slow and gentle cares   Treatment goal: Heal  and Infection control/prevention  STATUS: initial assessment  Supplies ordered: gathered and at bedside    Wound location: R elbow      Last photo: 9/23  Wound due to: Pressure Injury and Friction? POA  Wound history/plan of care: per pt first noted after she was sleeping in her recliner   Wound base: 100 % Fibrin,      Palpation of the wound bed: normal      Drainage: small     Description of drainage: serous     Measurements (length x width x depth, in cm): 1  x 1  x  0.1 cm      Tunneling: N/A     Undermining:  N/A  Periwound skin: Intact and Skin stripping      Color: dusky      Temperature: normal   Odor: none  Pain: moderate, tender  Pain interventions prior to dressing change: slow and gentle cares   Treatment goal: Heal  and Protection  STATUS: initial assessment  Supplies ordered: gathered and at bedside    Wound location: R posterior calf       Last photo: 9/23  Wound due to: Venous Ulcer  Wound history/plan of care: first noted after legs swelled up recently   Wound base: 100 % Fibrin,      Palpation of the wound bed: normal      Drainage: moderate     Description of drainage: serous     Measurements (length x width x depth, in cm): 1.2  x 1  x  0.1 cm      Tunneling: N/A     Undermining: N/A  Periwound skin: Edematous      Color: normal and consistent with surrounding tissue      Temperature: normal   Odor: none  Pain: moderate, tender  Pain interventions prior to dressing change: slow and gentle cares   Treatment goal: Heal   STATUS: initial assessment  Supplies ordered: gathered and at bedside    Wound location: buttocks, sacral      Last photo: 9/23  Wound due to: Shear  Wound history/plan of care: present on admission, appears to have shearing injury over sacral area, per pt does sit in a recliner at home   Wound base: 100 % Epidermis, Dermis, and Scar tissue,      Palpation of the wound bed: normal      Drainage: none     Description of drainage: none     Measurements (length x width x depth, in cm): scattered pea sized linear areas      Tunneling: N/A     Undermining: N/A  Periwound skin: Skin stripping      Color: dusky      Temperature: normal   Odor: none  Pain: moderate, tender and burning  Pain interventions prior to dressing change: slow and gentle cares   Treatment goal: Protection  STATUS: initial assessment  Supplies ordered: gathered and at bedside         Treatment Plan:     Suprapubic catheter site wound(s): Daily cleanse with microKlenz and dry, cut a 2x2 square of hydrofera blue foam (427015),  moisten with saline and ring out excess, then cut foam to look like split foam gauze and place around tube, secure with minimal tape.     R elbow, R thigh, R calf wounds: Every third day cleanse with microKlenz and dry, cut to fit Xeroform (207729) and place over wound beds, then cover with mepilex.     Buttock wounds: Every third day cleanse with microKlenz and dry, apply Cavilon no sting barrier film to skin around wound and let dry, then place mepilex. Remind pt to reposition q2hrs, encourage pt mobility, ensure to lift foot of bed prior to lifting head of bed to reduce shearing to sacrum.         Orders: Written    RECOMMEND PRIMARY TEAM ORDER: None, at this time  Education provided: plan of care  Discussed plan of care with: Patient  WO nurse follow-up plan: weekly  Notify WOC if wound(s) deteriorate.  Nursing to notify the Provider(s) and re-consult the WOC Nurse if new skin concern.    DATA:     Current support surface: Standard  Standard gel mattress (Isoflex)  Containment of urine/stool: Suprapubic catheter  BMI: Body mass index is 24.36 kg/m .   Active diet order: Orders Placed This Encounter      Renal Diet (non-dialysis)     Output: I/O last 3 completed shifts:  In: 1564 [P.O.:1464; I.V.:100]  Out: 930 [Urine:930]     Labs:   Recent Labs   Lab 09/23/24  0831 09/23/24  0305 09/22/24  0325 09/21/24  0903   ALBUMIN  --  2.2*   < > 2.4*   HGB 7.6*  --    < > 9.1*   INR  --   --   --  0.92   WBC 6.7  --    < > 5.7   A1C  --   --   --  5.0    < > = values in this interval not displayed.     Pressure injury risk assessment:   Sensory Perception: 3-->slightly limited  Moisture: 3-->occasionally moist  Activity: 3-->walks occasionally  Mobility: 3-->slightly limited  Nutrition: 3-->adequate  Friction and Shear: 3-->no apparent problem  Yogi Score: 18    Pager no longer is use, please contact through [employer]: [facility] Nurse Kamrar   Dept. Office Number: [phone]

## 2024-09-23 NOTE — PHARMACY-ADMISSION MEDICATION HISTORY
Pharmacist Admission Medication History    Admission medication history is complete. The information provided in this note is only as accurate as the sources available at the time of the update.    Information Source(s): Patient, Family member, Hospital records, and CareEverywhere/SureScripts via phone    Pertinent Information: Patient and family unclear of which dysarthria/dyskinesia medications the patient should be taking. Based on interview, the patient is currently not taking any benztropine, gabapentin, oxybutynin, methocarbamol, tizanidine, or quetiapine. Patient's family reports taking apixaban 5 mg twice daily. Please send refill for albuterol inhaler and hydrocortisone 2.5% cream on discharge per request of patient's family.     Changes made to PTA medication list:  Added: None  Deleted: None  Changed: None    Allergies reviewed with patient and updates made in EHR: yes    Medication History Completed By: Manuel Hall, PharmD 9/23/2024 1:46 PM    Prior to Admission medications    Medication Sig Last Dose Taking? Auth Provider Long Term End Date   acetaminophen (TYLENOL) 325 MG tablet Take 2 tablets (650 mg) by mouth every 6 hours as needed for mild pain Past Week Yes Dany Perez MD No    albuterol (PROAIR HFA/PROVENTIL HFA/VENTOLIN HFA) 108 (90 Base) MCG/ACT inhaler Inhale 1-2 puffs into the lungs every 4 hours as needed for shortness of breath More than a month Yes Reported, Patient     cephALEXin (KEFLEX) 500 MG capsule Take 1 capsule (500 mg) by mouth 3 times daily for 7 days.  Yes Lei Gastelum MD  9/28/24   cyanocobalamin (CYANOCOBALAMIN) 1000 MCG/ML injection Inject 1 mL (1,000 mcg) into a muscle every month. Past Month Yes Reported, Patient     dexAMETHasone (DECADRON) 4 MG tablet Take 2 tablets (8 mg) by mouth daily (with breakfast). Take daily x 3 days following chemo Past Month Yes Kelsey Mccracken, ERINN CNP Yes    diclofenac (VOLTAREN) 1 % topical gel Apply to: Skin on  Both/All  Knee for pain. Topical 1% gel, 4 g applied TOPICALLY to lower extremities 3 times daily PRN ; Past Week Yes Reported, Patient     hydrocortisone 2.5 % cream Apply topically as needed Past Week Yes Reported, Patient     lidocaine (XYLOCAINE) 5 % external ointment Apply 1 Application topically as needed Past Week Yes Reported, Patient     naloxone (NARCAN) 4 MG/0.1ML nasal spray Spray 1 spray (4 mg) into one nostril alternating nostrils as needed for opioid reversal every 2-3 minutes until assistance arrives Unknown Yes Gaurang Guajardo MD Yes    ondansetron (ZOFRAN) 8 MG tablet Take 1 tablet (8 mg) by mouth every 8 hours as needed for nausea Past Week Yes Kelsey Mccracken APRN CNP     oxyCODONE (ROXICODONE) 5 MG tablet Take 1 tablet (5 mg) by mouth every 6 hours as needed for breakthrough pain Past Week Yes Dany Perez MD No    polyethylene glycol (MIRALAX) 17 GM/Dose powder Take 17 g by mouth daily as needed for constipation Past Week Yes Dany Perez MD     prochlorperazine (COMPAZINE) 5 MG tablet Take 1-2 tablets (5-10 mg) by mouth every 6 hours as needed for nausea or vomiting Past Week Yes Kelsey Mccracken APRN CNP     senna-docusate (SENOKOT-S/PERICOLACE) 8.6-50 MG tablet Take 1 tablet by mouth 2 times daily Past Week Yes Dany Perez MD     zolpidem (AMBIEN) 10 MG tablet Take 10 mg by mouth nightly as needed Past Week Yes Reported, Patient     apixaban ANTICOAGULANT (ELIQUIS) 5 MG tablet Take 1 tablet (5 mg) by mouth 2 times daily 9/20/2024  Robin Herrera MD No    atenolol (TENORMIN) 25 MG tablet Take 1 tablet (25 mg) by mouth daily 9/20/2024  Dany Perez MD Yes    calcium Citrate-vitamin D 500-400 MG-UNIT CHEW Take 1 tablet by mouth daily 9/20/2024  Reported, Patient     childrens multivitamin w/iron (FLINTSTONES COMPLETE) 60 MG chewable tablet Take 2 tablets by mouth daily 9/20/2024  Reported, Patient     cholecalciferol 125 MCG (5000 UT) CAPS Take 5000 mg 4 times a week 9/20/2024   Reported, Patient     ferrous sulfate (CASSANDRA-IN-SOL) 75 (15 FE) MG/ML oral drops Take 15 mg by mouth daily 9/20/2024  Reported, Patient

## 2024-09-24 ENCOUNTER — HOME INFUSION (PRE-WILLOW HOME INFUSION) (OUTPATIENT)
Dept: PHARMACY | Facility: CLINIC | Age: 69
End: 2024-09-24
Payer: COMMERCIAL

## 2024-09-24 ENCOUNTER — APPOINTMENT (OUTPATIENT)
Dept: OCCUPATIONAL THERAPY | Facility: CLINIC | Age: 69
End: 2024-09-24
Attending: STUDENT IN AN ORGANIZED HEALTH CARE EDUCATION/TRAINING PROGRAM
Payer: COMMERCIAL

## 2024-09-24 LAB
ALBUMIN SERPL BCG-MCNC: 2.2 G/DL (ref 3.5–5.2)
ALP SERPL-CCNC: 80 U/L (ref 40–150)
ALT SERPL W P-5'-P-CCNC: 6 U/L (ref 0–50)
ANION GAP SERPL CALCULATED.3IONS-SCNC: 9 MMOL/L (ref 7–15)
AST SERPL W P-5'-P-CCNC: 9 U/L (ref 0–45)
BILIRUB SERPL-MCNC: 0.2 MG/DL
BUN SERPL-MCNC: 21.7 MG/DL (ref 8–23)
BURR CELLS BLD QL SMEAR: SLIGHT
CALCIUM SERPL-MCNC: 7.7 MG/DL (ref 8.8–10.4)
CHLORIDE SERPL-SCNC: 100 MMOL/L (ref 98–107)
CREAT SERPL-MCNC: 1.24 MG/DL (ref 0.51–0.95)
EGFRCR SERPLBLD CKD-EPI 2021: 46 ML/MIN/1.73M2
ERYTHROCYTE [DISTWIDTH] IN BLOOD BY AUTOMATED COUNT: 21.3 % (ref 10–15)
FERRITIN SERPL-MCNC: 450 NG/ML (ref 11–328)
GLUCOSE SERPL-MCNC: 87 MG/DL (ref 70–99)
HCO3 SERPL-SCNC: 21 MMOL/L (ref 22–29)
HCT VFR BLD AUTO: 24.1 % (ref 35–47)
HGB BLD-MCNC: 7.9 G/DL (ref 11.7–15.7)
IRON BINDING CAPACITY (ROCHE): 100 UG/DL (ref 240–430)
IRON SATN MFR SERPL: 22 % (ref 15–46)
IRON SERPL-MCNC: 22 UG/DL (ref 37–145)
LOCATION OF TASK: NORMAL
MAGNESIUM SERPL-MCNC: 1.5 MG/DL (ref 1.7–2.3)
MAGNESIUM SERPL-MCNC: 2.9 MG/DL (ref 1.7–2.3)
MCH RBC QN AUTO: 28.1 PG (ref 26.5–33)
MCHC RBC AUTO-ENTMCNC: 32.8 G/DL (ref 31.5–36.5)
MCV RBC AUTO: 86 FL (ref 78–100)
PLAT MORPH BLD: ABNORMAL
PLATELET # BLD AUTO: 49 10E3/UL (ref 150–450)
POTASSIUM SERPL-SCNC: 3.2 MMOL/L (ref 3.4–5.3)
POTASSIUM SERPL-SCNC: 4 MMOL/L (ref 3.4–5.3)
PROT PATTERN SERPL IFE-IMP: NORMAL
PROT SERPL-MCNC: 5 G/DL (ref 6.4–8.3)
RBC # BLD AUTO: 2.81 10E6/UL (ref 3.8–5.2)
RBC MORPH BLD: ABNORMAL
SODIUM SERPL-SCNC: 130 MMOL/L (ref 135–145)
TARGETS BLD QL SMEAR: SLIGHT
TRANSFERRIN SERPL-MCNC: 76.6 MG/DL (ref 200–360)
WBC # BLD AUTO: 8.1 10E3/UL (ref 4–11)

## 2024-09-24 PROCEDURE — 250N000013 HC RX MED GY IP 250 OP 250 PS 637: Performed by: STUDENT IN AN ORGANIZED HEALTH CARE EDUCATION/TRAINING PROGRAM

## 2024-09-24 PROCEDURE — 97165 OT EVAL LOW COMPLEX 30 MIN: CPT | Mod: GO

## 2024-09-24 PROCEDURE — 87040 BLOOD CULTURE FOR BACTERIA: CPT | Performed by: STUDENT IN AN ORGANIZED HEALTH CARE EDUCATION/TRAINING PROGRAM

## 2024-09-24 PROCEDURE — 99233 SBSQ HOSP IP/OBS HIGH 50: CPT | Performed by: STUDENT IN AN ORGANIZED HEALTH CARE EDUCATION/TRAINING PROGRAM

## 2024-09-24 PROCEDURE — 250N000011 HC RX IP 250 OP 636: Performed by: INTERNAL MEDICINE

## 2024-09-24 PROCEDURE — 97140 MANUAL THERAPY 1/> REGIONS: CPT | Mod: GO

## 2024-09-24 PROCEDURE — 83550 IRON BINDING TEST: CPT | Performed by: STUDENT IN AN ORGANIZED HEALTH CARE EDUCATION/TRAINING PROGRAM

## 2024-09-24 PROCEDURE — 250N000011 HC RX IP 250 OP 636: Performed by: STUDENT IN AN ORGANIZED HEALTH CARE EDUCATION/TRAINING PROGRAM

## 2024-09-24 PROCEDURE — 97535 SELF CARE MNGMENT TRAINING: CPT | Mod: GO

## 2024-09-24 PROCEDURE — 36415 COLL VENOUS BLD VENIPUNCTURE: CPT | Performed by: STUDENT IN AN ORGANIZED HEALTH CARE EDUCATION/TRAINING PROGRAM

## 2024-09-24 PROCEDURE — 85027 COMPLETE CBC AUTOMATED: CPT | Performed by: STUDENT IN AN ORGANIZED HEALTH CARE EDUCATION/TRAINING PROGRAM

## 2024-09-24 PROCEDURE — 84466 ASSAY OF TRANSFERRIN: CPT | Performed by: STUDENT IN AN ORGANIZED HEALTH CARE EDUCATION/TRAINING PROGRAM

## 2024-09-24 PROCEDURE — 82728 ASSAY OF FERRITIN: CPT | Performed by: STUDENT IN AN ORGANIZED HEALTH CARE EDUCATION/TRAINING PROGRAM

## 2024-09-24 PROCEDURE — 83735 ASSAY OF MAGNESIUM: CPT | Performed by: INTERNAL MEDICINE

## 2024-09-24 PROCEDURE — 82435 ASSAY OF BLOOD CHLORIDE: CPT | Performed by: STUDENT IN AN ORGANIZED HEALTH CARE EDUCATION/TRAINING PROGRAM

## 2024-09-24 PROCEDURE — 250N000013 HC RX MED GY IP 250 OP 250 PS 637

## 2024-09-24 PROCEDURE — 99233 SBSQ HOSP IP/OBS HIGH 50: CPT | Mod: 24 | Performed by: INTERNAL MEDICINE

## 2024-09-24 PROCEDURE — 84132 ASSAY OF SERUM POTASSIUM: CPT | Performed by: INTERNAL MEDICINE

## 2024-09-24 PROCEDURE — 120N000002 HC R&B MED SURG/OB UMMC

## 2024-09-24 PROCEDURE — 83735 ASSAY OF MAGNESIUM: CPT | Performed by: STUDENT IN AN ORGANIZED HEALTH CARE EDUCATION/TRAINING PROGRAM

## 2024-09-24 RX ORDER — HEPARIN SODIUM,PORCINE 10 UNIT/ML
5-10 VIAL (ML) INTRAVENOUS
Status: DISCONTINUED | OUTPATIENT
Start: 2024-09-24 | End: 2024-09-27 | Stop reason: HOSPADM

## 2024-09-24 RX ORDER — MAGNESIUM SULFATE HEPTAHYDRATE 40 MG/ML
4 INJECTION, SOLUTION INTRAVENOUS ONCE
Status: CANCELLED | OUTPATIENT
Start: 2024-09-24 | End: 2024-09-24

## 2024-09-24 RX ORDER — POTASSIUM CHLORIDE 29.8 MG/ML
20 INJECTION INTRAVENOUS
Status: COMPLETED | OUTPATIENT
Start: 2024-09-24 | End: 2024-09-24

## 2024-09-24 RX ORDER — LANOLIN ALCOHOL/MO/W.PET/CERES
3 CREAM (GRAM) TOPICAL AT BEDTIME
Status: DISCONTINUED | OUTPATIENT
Start: 2024-09-24 | End: 2024-09-27 | Stop reason: HOSPADM

## 2024-09-24 RX ORDER — ATENOLOL 25 MG/1
25 TABLET ORAL DAILY
Status: DISCONTINUED | OUTPATIENT
Start: 2024-09-25 | End: 2024-09-26

## 2024-09-24 RX ORDER — HEPARIN SODIUM (PORCINE) LOCK FLUSH IV SOLN 100 UNIT/ML 100 UNIT/ML
5-10 SOLUTION INTRAVENOUS
Status: DISCONTINUED | OUTPATIENT
Start: 2024-09-24 | End: 2024-09-27 | Stop reason: HOSPADM

## 2024-09-24 RX ORDER — HEPARIN SODIUM,PORCINE 10 UNIT/ML
5-10 VIAL (ML) INTRAVENOUS EVERY 24 HOURS
Status: DISCONTINUED | OUTPATIENT
Start: 2024-09-24 | End: 2024-09-27 | Stop reason: HOSPADM

## 2024-09-24 RX ORDER — MAGNESIUM SULFATE HEPTAHYDRATE 40 MG/ML
4 INJECTION, SOLUTION INTRAVENOUS ONCE
Status: COMPLETED | OUTPATIENT
Start: 2024-09-24 | End: 2024-09-24

## 2024-09-24 RX ORDER — ONDANSETRON 4 MG/1
4 TABLET, ORALLY DISINTEGRATING ORAL EVERY 6 HOURS PRN
Status: DISCONTINUED | OUTPATIENT
Start: 2024-09-24 | End: 2024-09-27 | Stop reason: HOSPADM

## 2024-09-24 RX ADMIN — Medication 3 MG: at 22:26

## 2024-09-24 RX ADMIN — MAGNESIUM SULFATE IN WATER 4 G: 40 INJECTION, SOLUTION INTRAVENOUS at 08:05

## 2024-09-24 RX ADMIN — POTASSIUM CHLORIDE 20 MEQ: 29.8 INJECTION, SOLUTION INTRAVENOUS at 10:49

## 2024-09-24 RX ADMIN — POLYETHYLENE GLYCOL 3350 17 G: 17 POWDER, FOR SOLUTION ORAL at 10:49

## 2024-09-24 RX ADMIN — HEPARIN, PORCINE (PF) 10 UNIT/ML INTRAVENOUS SYRINGE 5 ML: at 13:49

## 2024-09-24 RX ADMIN — DICLOFENAC SODIUM 2 G: 10 GEL TOPICAL at 20:07

## 2024-09-24 RX ADMIN — OXYCODONE HYDROCHLORIDE 15 MG: 5 TABLET ORAL at 13:27

## 2024-09-24 RX ADMIN — OXYCODONE HYDROCHLORIDE 15 MG: 5 TABLET ORAL at 20:07

## 2024-09-24 RX ADMIN — OXYCODONE HYDROCHLORIDE 15 MG: 5 TABLET ORAL at 03:15

## 2024-09-24 RX ADMIN — ACETAMINOPHEN 650 MG: 325 TABLET ORAL at 17:22

## 2024-09-24 RX ADMIN — ONDANSETRON 4 MG: 4 TABLET, ORALLY DISINTEGRATING ORAL at 11:06

## 2024-09-24 RX ADMIN — POTASSIUM CHLORIDE 20 MEQ: 29.8 INJECTION, SOLUTION INTRAVENOUS at 09:06

## 2024-09-24 RX ADMIN — ERTAPENEM SODIUM 1 G: 1 INJECTION, POWDER, LYOPHILIZED, FOR SOLUTION INTRAMUSCULAR; INTRAVENOUS at 06:00

## 2024-09-24 ASSESSMENT — ACTIVITIES OF DAILY LIVING (ADL)
ADLS_ACUITY_SCORE: 37

## 2024-09-24 NOTE — PLAN OF CARE
Time 5389-4186     /75 (BP Location: Left arm)   Pulse 79   Temp 97.7  F (36.5  C) (Oral)   Resp 14   Wt 62.4 kg (137 lb 8 oz)   SpO2 100%   BMI 24.36 kg/m      A&O x4. VSS on RA. Pain in abdomen, rates 7-8/10. Managed with PRN oxy. Intermittent nausea (x1), relieved with zofran. Denies vomiting, SOB, dizziness. Moderate (+3) edema in lower extremities, lymphedema therapy wrapped. Port HL. Suprapubic catheter voiding with adequate output. LBM 9/22. Mag 1.5 and potassium 3.2 replaced, rechecks completed.     Goal Outcome Evaluation:      Plan of Care Reviewed With: patient    Overall Patient Progress: no changeOverall Patient Progress: no change    Outcome Evaluation: intermittent nausea x1, pain at 7/10

## 2024-09-24 NOTE — PROGRESS NOTES
09/24/24 0900   Appointment Info   Signing Clinician's Name / Credentials (OT) Darling Chaveztammy, OTR/L   Living Environment   People in Home grandchild(vernell)   Current Living Arrangements house   Home Accessibility stairs to enter home   Number of Stairs, Main Entrance 6   Transportation Anticipated family or friend will provide   Living Environment Comments Pt lives in a house with her adult grandchildren, reports 6 ABBIE states there is railing but she would rather hang onto her grandson for balance support. Tub shower combo.   Self-Care   Usual Activity Tolerance moderate   Current Activity Tolerance moderate   Regular Exercise Yes   Activity/Exercise Type other (see comments)   Exercise Amount/Frequency 2 times/wk  (HH PT)   Equipment Currently Used at Home commode chair;shower chair;walker, rolling   Fall history within last six months no   Activity/Exercise/Self-Care Comment Pt reports she is able to ambulate in house with fww and complete basic  ADLs with AE listed above, mod-I for most part. Occasionally gets assistance with bathing from granddaughter.   Instrumental Activities of Daily Living (IADL)   IADL Comments Pts grandkids assist with tyhe majority of IADLs at baseline, pt has 24/7 assist from grandkids at all times.   General Information   Onset of Illness/Injury or Date of Surgery 09/20/24   Referring Physician Jeanette Cha MD   Patient/Family Therapy Goal Statement (OT) return home   Additional Occupational Profile Info/Pertinent History of Current Problem Per chart: 69-year-old female who presented to the ED due to concern for UTI and confusion.  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    Existing Precautions/Restrictions fall   Limitations/Impairments safety/cognitive   Left Upper Extremity (Weight-bearing Status) full weight-bearing (FWB)   Right Upper Extremity (Weight-bearing Status) full weight-bearing (FWB)   Left Lower Extremity (Weight-bearing Status) full weight-bearing (FWB)   Right Lower Extremity (Weight-bearing Status) full weight-bearing (FWB)   General Observations and Info Activity: ambulate   Cognitive Status Examination   Orientation Status orientation to person, place and time   Cognitive Status Comments Pt forgetful but demos insight to deficits, pt was able to follow all commands without difficulty, will continue to monitor.   Visual Perception   Visual Impairment/Limitations WNL   Sensory   Sensory Quick Adds sensation intact   Pain Assessment   Patient Currently in Pain Yes, see Vital Sign flowsheet   Edema General Information   Onset of Edema   (acute on chronic)   Affected Body Part(s) Left LE;Right LE   Edema Etiology Other (see comments)   Etiology Comments anasarca, low albumin, hypervolemia, decreased muscle pump activation   Edema Precaution Comments No known precautions   General Comments/Previous Edema Treatment/Edema Equipment Pt has had edema services during previous hospital stays.   Edema Examination/Assessment   Skin Condition Pitting;Dryness;Intact   Skin Condition Comments Skin intact but dry, shiny, and taut with 2+ pitting present from MTPs to knees. Small open sore on posterior aspect of R calf. Skin peeling in spots and wrinkles over anterior aspect of bilateral shins.   Scar No   Ulcerations Yes   Pitting Assessment 2+   Posture   Posture forward head position;protracted shoulders   Range of Motion Comprehensive   Comment, General Range of Motion BUE ROM WFL   Strength Comprehensive (MMT)   Comment, General Manual Muscle Testing (MMT) Assessment BUE strength WFL, 3/5   Coordination   Upper Extremity Coordination No deficits were identified   Fine Motor  Coordination Not tested   Bed Mobility   Comment (Bed Mobility) Charly sit to supine   Transfers   Transfer Comments Charly sit<>stand from low surfaces   Balance   Balance Comments CGA with fww for balance for ambulation   Activities of Daily Living   BADL Assessment/Intervention bathing;lower body dressing;grooming;toileting   Bathing Assessment/Intervention   Comment, (Bathing) Charly   Lower Body Dressing Assessment/Training   Comment, (Lower Body Dressing) maxA   Grooming Assessment/Training   Comment, (Grooming) SBA with fww   Toileting   Comment, (Toileting) CGA per clinical judgement   Clinical Impression   Criteria for Skilled Therapeutic Interventions Met (OT) Yes, treatment indicated   OT Diagnosis increased risk for deconditioning with hospital stay affecting ADL-I   Edema: Patient Presentation Edema   OT Problem List-Impairments impacting ADL problems related to;activity tolerance impaired;cognition;strength;mobility   Assessment of Occupational Performance 3-5 Performance Deficits   Identified Performance Deficits ambulation, transferring, bathing, toileting, dressing   Planned Therapy Interventions (OT) ADL retraining;strengthening;progressive activity/exercise;transfer training;bed mobility training   Edema: Planned Interventions Gradient compression bandaging;Fit for compression garment   Clinical Decision Making Complexity (OT) problem focused assessment/low complexity   Risk & Benefits of therapy have been explained evaluation/treatment results reviewed;care plan/treatment goals reviewed;risks/benefits reviewed;current/potential barriers reviewed;participants voiced agreement with care plan;participants included;patient   OT Total Evaluation Time   OT Eval, Low Complexity Minutes (95769) 7   OT Goals   Therapy Frequency (OT) 6 times/week  (4x/week edema)   OT Predicted Duration/Target Date for Goal Attainment 10/08/24   OT: Hygiene/Grooming modified independent;using adaptive equipment   OT: Lower Body  Dressing Modified independent;using adaptive equipment;including set-up/clothing retrieval   OT: Lower Body Bathing Modified independent;using adaptive equipment   OT: Transfer Modified independent;with assistive device  (tub transfer)   OT: Toilet Transfer/Toileting Modified independent;cleaning and garment management;using adaptive equipment;toilet transfer   OT: Edema education to increase ability to manage edema after discharge from the hospital Patient;Caregiver;Demonstrate;Blanco;Skin care routine;signs/symptoms of intolerance;wear schedule;limb positioning;garrnet/bandage care;discharge recommendations   OT: Management of edema bandages Patient;Caregiver;Demonstrate;Blanco;quick wrap;garment(s)   Self-Care/Home Management   Self-Care/Home Mgmt/ADL, Compensatory, Meal Prep Minutes (21268) 39   Symptoms Noted During/After Treatment (Meal Preparation/Planning Training) fatigue   Treatment Detail/Skilled Intervention OT: Pt supine in bed upon arrival, pleasant and agreeable to therapy session, eval completed and tx initiated. Charly for bed mobility from supine to sit. CGA for sit<>stand transfer from raised EOB. Pt ambulated with close CGA and fww from bed into bathroom. MaxA for doffing clothing in prep for bathing task. Charly x1 for transfer into walk-in shower with heavy reliance on grab bars for balance support. Charly for bathing task to wash back and distal portion of BLEs, Charly for sit<>stand transfer from shower chair. Charly needed to transfer out of shower and over to toilet. ModA for drying off, maxA to don socks. Pt transferred off of toilet with Charly x1 and fww. Pt ambulated as above back to EOB, Charly to return to supine. Pt was left in bed with all needs met at end of session, additional time needed for environmental setup to ensure safety with all task completion.   Manual Therapy   Manual Therapy: Mobilization, MFR, MLD, friction massage minutes (16807) 26   Symptoms noted during/after  "treatment none   Treatment Detail/Skilled Intervention OT: Pt supine in bed upon arrival, agreeable to therapy session. Edema evaluation completed and discussed POC. Education provided on physical anatomy, disease process, precautions, and treatment options. Skin cares completed, skin intact but dry, shiny, and taut with 2+ pitting present from MTPs to knees. Small open sore on posterior aspect of R calf. Skin peeling in spots and wrinkles over anterior aspect of bilateral shins. Applied GCB from MTP to knee creases using \"quick wrap technique\" for management of edema and protection of skin integrity to promote independence with functional mobility and ADLs. Patient reported comfort with fit and reviewed wear schedule. Encouraged pt to wear wraps for no longer than 48 hours and remove if numbness/tingling/soiling occurs.   OT Discharge Planning   OT Plan OT: standing ADLs, toileting, tub transfer   OT Discharge Recommendation (DC Rec) home with assist;home with home care occupational therapy;Transitional Care Facility   OT Rationale for DC Rec Pt presents to OT today slightly below functional baseline but will have 24/7 assist from family. Pt would benefit from short TCU stay at this [facility] but pending LOS could return home with continued  PT. Pt will need to do stairs with physical therapy prior to leaving.   Total Session Time   Timed Code Treatment Minutes 65   Total Session Time (sum of timed and untimed services) 72     "

## 2024-09-24 NOTE — PROGRESS NOTES
Therapy: IV abx  Insurance: [facility]     Pt has IV abx coverage through their [facility] plan, coverage should be at 100%, however there may be a copay upon dispense.    In reference to admission on 9/20/24 to check IV abx coverage    Please contact Intake with any questions, [phone] or In Basket pool, [facility] ([phone]).

## 2024-09-24 NOTE — PROGRESS NOTES
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      Improving, non-oliguric MADHU   sCr on admit 2.14.  Baseline sCr 0.8  24h U protein 497mg, U Na 103  BÁRBARA negative, C3/C4 wnl, SPEP pending   Patient w/ acute on chronic anemia and thrombocytopenia, timing correlates w chemo but is concerning for a TMA in the correct context, that being said her sCr has been improving since admit w the treatment of her infection   Likely 2/2 ATN in the setting of bacteremia/infection.    Hyponatremia  Metabolic acidosis  2/2 above    Hypokalemia  present on admit, now resolved    ESBL K pneumoniae bacteremia   History of REMA-III, s/p laser therapy     Recommendations were communicated to primary team via note     Nephrology will sign off at this time, please do not hesitate to reach out with any questions or concerns.    Seen and discussed with Dr. Rachel Franklin MD   Division of Renal Disease and Hypertension  [facility]  [employer] Web Console    SUBJECTIVE     Interval History :   Nursing and provider notes from last 24 hours reviewed.  In the last 24 hours Alis Hartman   sCr continues to improve. Some abdominal pain and nausea     A comprehensive review of systems was negative except as noted above.    OBJECTIVE     Physical Exam:   I/O last 3 completed shifts:  In: 1347 [P.O.:1217; I.V.:130]  Out: 1000 [Urine:1000]   /75 (BP Location: Left arm)   Pulse 79   Temp 97.7  F (36.5  C) (Oral)    Resp 14   Wt 62.4 kg (137 lb 8 oz)   SpO2 100%   BMI 24.36 kg/m       General:lying in bed  HEENT:mmm  Pulmonary:unlabored  Abdominal:nondistended  Extremities:1+ b/l LE edema   Access:PIV    Labs:   All labs reviewed by me  Electrolytes/Renal -   Recent Labs   Lab Test 09/24/24 0314 09/23/24 1838 09/23/24 0305 09/22/24  1819 09/22/24 0325 09/21/24  0903 09/21/24  0004 08/01/24  0554 07/31/24  0655 07/30/24  0551   * 130* 128*  128*   < > 123*  124*   < > 125*   < > 141 142   POTASSIUM 3.2*  --  3.5  3.5  --  4.0  4.0   < > 3.0*   < > 4.4 4.3   CHLORIDE 100  --  98  --  92*   < > 91*   < > 115* 113*   CO2 21*  --  20*  --  20*   < > 16*   < > 19* 24   BUN 21.7  --  21.4  --  22.8   < > 21.0   < > 10.4 9.6   CR 1.24*  --  1.52*  --  1.96*   < > 2.14*   < > 1.20* 1.28*   GLC 87  --  91  --  91   < > 100*   < > 75 78   SAMUEL 7.7*  --  7.7*  --  8.0*   < > 8.0*   < > 7.7* 7.9*   MAG 1.5*  --  1.6*  --  1.9  --  1.1*   < > 1.7 1.8   PHOS  --   --   --   --   --   --  3.0  --  2.6 3.1    < > = values in this interval not displayed.       CBC -   Recent Labs   Lab Test 09/24/24 0314 09/23/24 0831 09/22/24 0325   WBC 8.1 6.7 6.8   HGB 7.9* 7.6* 8.4*   PLT 49* 43* 47*       LFTs -   Recent Labs   Lab Test 09/24/24 0314 09/23/24 0305 09/22/24 0325   ALKPHOS 80 73 76   BILITOTAL 0.2 0.2 0.3   ALT 6 7 8   AST 9 11 11   PROTTOTAL 5.0* 4.7* 5.1*   ALBUMIN 2.2* 2.2* 2.3*       Iron Panel - No lab results found.    Current Medications:  Current Facility-Administered Medications   Medication Dose Route Frequency Provider Last Rate Last Admin    ertapenem (INVanz) 1 g vial to attach to  mL bag  1 g Intravenous Q24H Rocio Hwang MD   1 g at 09/24/24 0600    melatonin tablet 3 mg  3 mg Oral At Bedtime Jeanette Cha MD        potassium chloride 20 mEq in 50 mL intermittent infusion  20 mEq Intravenous Q2H Corina Baxter MD 33.3 mL/hr at 09/24/24 1049 20 mEq at 09/24/24 1049    sodium  chloride (PF) 0.9% PF flush 3 mL  3 mL Intracatheter Q8H Radha Fischer MD   3 mL at 09/24/24 0000     Current Facility-Administered Medications   Medication Dose Route Frequency Provider Last Rate Last Admin     Robin Franklin MD

## 2024-09-24 NOTE — PROGRESS NOTES
"Care Management Follow Up    Length of Stay (days): 3  Expected Discharge Date: undetermined per provider  Concerns to be Addressed: discharge planning     Patient plan of care discussed at interdisciplinary rounds: Yes     Anticipated Discharge Disposition: Home  (on 9/24 OT recs TCU v home, need PT recs, etc)     Anticipated Discharge Services:   [facility] (7 hr/day) - resume     [facility] - resume     Home Care - resume RN/PT- ordered, accepted  [facility]  P: [phone]  F: [phone]     Anticipated Discharge DME: new: Wheelchair - referral/order sent to [employer]      Patient/family educated on Medicare website which has current facility and service quality ratings: yes  Education Provided on the Discharge Plan: Yes  Patient/Family in Agreement with the Plan: yes     Referrals Placed by CM/SW: None  Private pay costs discussed: Per [employer]     Discussed  Partnership in Safe Discharge Planning  document with patient/family: No      Handoff Completed: Yes, RANI PCP: Internal handoff referral completed     Additional Information:  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 Currently on IV AB. Per notes, plan for monthly OP suprapubic tube exchange.     10:38 AM  Per attending note yesterday appears pt needs 14 days of IV daily erta. Dr. Cha will assess if pt will need IV AB at discharge. RNCC will start Referral to [facility] who will complete benefit check and discuss choice with patient just in case.    Per [facility], \"Pt has IV abx coverage through their [facility] plan, coverage should be at 100%, however there may be a copay upon dispense.\"    4:08 PM  OT assessed pt today and recs are TCU v home. OT will work with pt again tomorrow. PT is ordered.        Next Steps:   [] Follow up on AB needs  [] Follow up on therapy recs  [] Follow up on W/C order " with [employer]  [] Send AVS to [employer] at discharge        Jaylin Villar, RNCC  Care Management Department   Covering 5A RNCC  Available by Radha

## 2024-09-24 NOTE — PLAN OF CARE
/76 (BP Location: Left arm)   Pulse 79   Temp 97.8  F (36.6  C) (Oral)   Resp 14   Wt 62.4 kg (137 lb 8 oz)   SpO2 100%   BMI 24.36 kg/m       VSS. A&Ox3 with intermittent confusion with time. Denies SOB, N/V and dizziness. Pt continues to rate abdominal pain 6-8/10, managed with PRN Tylenol x1 and Oxycodone x1 with relief. Ax1 to BSC; pt did not get OOB this shift, bed alarm on for safety. Supra pubic cath intact, no BM this shift. R port infusing. Skin tear wounds dressed/improving. K+ 3.2, will need x2 doses replaced and recheck. Mag 1.5, will need x1 dose replaced and recheck. POC ongoing, team to be notified of any changes.       Problem: Adult Inpatient Plan of Care  Goal: Optimal Comfort and Wellbeing  Outcome: Progressing  Intervention: Monitor Pain and Promote Comfort  Recent Flowsheet Documentation  Taken 9/24/2024 0600 by Katie Guerrero RN  Pain Management Interventions:   medication (see MAR)   pillow support provided   relaxation techniques promoted   repositioned  Taken 9/24/2024 0039 by Katie Guerrero RN  Pain Management Interventions:   medication (see MAR)   pillow support provided   relaxation techniques promoted   repositioned     Problem: Risk for Delirium  Goal: Optimal Coping  Outcome: Progressing  Intervention: Optimize Psychosocial Adjustment to Delirium  Recent Flowsheet Documentation  Taken 9/24/2024 0600 by Katie Guerrero, RN  Supportive Measures:   active listening utilized   relaxation techniques promoted   self-care encouraged  Taken 9/24/2024 0100 by Katie Guerrero RN  Supportive Measures:   active listening utilized   relaxation techniques promoted   self-care encouraged     Problem: Skin Injury Risk Increased  Goal: Skin Health and Integrity  9/24/2024 0658 by Katie Guerrero, RN  Outcome: Progressing  9/24/2024 0627 by Katie Guerrero RN  Outcome: Progressing  Intervention: Plan: Nurse Driven Intervention: Moisture Management  Recent Flowsheet Documentation  Taken  9/24/2024 0600 by Katie Guerrero RN  Moisture Interventions:   Encourage regular toileting   Incontinence pad   Urinary collection device   Perineal cleanser  Bathing/Skin Care: moisturizer applied  Taken 9/24/2024 0100 by Katie Guerrero RN  Moisture Interventions:   Encourage regular toileting   Incontinence pad   Urinary collection device   Perineal cleanser  Intervention: Plan: Nurse Driven Intervention: Friction and Shear  Recent Flowsheet Documentation  Taken 9/24/2024 0600 by Katie Guerrero RN  Friction/Shear Interventions: HOB 30 degrees or less  Taken 9/24/2024 0100 by Katie Guerrero RN  Friction/Shear Interventions: HOB 30 degrees or less  Intervention: Optimize Skin Protection  Recent Flowsheet Documentation  Taken 9/24/2024 0600 by Katie Guerrero RN  Pressure Reduction Techniques:   frequent weight shift encouraged   heels elevated off bed   positioned off wounds  Pressure Reduction Devices: pressure-redistributing mattress utilized  Skin Protection: incontinence pads utilized  Activity Management:   activity adjusted per tolerance   activity encouraged  Head of Bed (HOB) Positioning: HOB at 30-45 degrees  Taken 9/24/2024 0100 by Katie Guerrero RN  Pressure Reduction Techniques:   frequent weight shift encouraged   heels elevated off bed   positioned off wounds  Pressure Reduction Devices: pressure-redistributing mattress utilized  Skin Protection: incontinence pads utilized  Taken 9/24/2024 0039 by Katie Guerrero RN  Activity Management:   activity adjusted per tolerance   activity encouraged  Head of Bed (HOB) Positioning: HOB at 30-45 degrees  Intervention: Promote and Optimize Oral Intake  Recent Flowsheet Documentation  Taken 9/24/2024 0600 by Katie Guerrero RN  Oral Nutrition Promotion: calorie-dense foods provided  Taken 9/24/2024 0100 by Katie Guerrero RN  Oral Nutrition Promotion: calorie-dense foods provided     Problem: Fall Injury Risk  Goal: Absence of Fall and Fall-Related  Injury  9/24/2024 0658 by Katie Guerrero RN  Outcome: Progressing  9/24/2024 0627 by Katie Guerrero RN  Outcome: Progressing  Intervention: Identify and Manage Contributors  Recent Flowsheet Documentation  Taken 9/24/2024 0600 by Katie Guerrero RN  Medication Review/Management: medications reviewed  Taken 9/24/2024 0000 by Katie Guerrero RN  Medication Review/Management: medications reviewed  Intervention: Promote Injury-Free Environment  Recent Flowsheet Documentation  Taken 9/24/2024 0600 by Katie Guerrero RN  Safety Promotion/Fall Prevention:   safety round/check completed   activity supervised   assistive device/personal items within reach   clutter free environment maintained   increase visualization of patient   lighting adjusted   nonskid shoes/slippers when out of bed   patient and family education   room near nurse's station  Taken 9/24/2024 0400 by Katie Guerrero RN  Safety Promotion/Fall Prevention: safety round/check completed  Taken 9/24/2024 0200 by Katie Guerrero RN  Safety Promotion/Fall Prevention: safety round/check completed  Taken 9/24/2024 0000 by Katie Guerrero RN  Safety Promotion/Fall Prevention: (Pt in bed watching TV)   safety round/check completed   activity supervised   assistive device/personal items within reach   clutter free environment maintained   increase visualization of patient   lighting adjusted   nonskid shoes/slippers when out of bed   patient and family education   room near nurse's station   Goal Outcome Evaluation:

## 2024-09-24 NOTE — PROGRESS NOTES
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        #Catheter-associated UTI  #ESBL K pneumoniae bacteremia  #Hx of Klebsiella pna ESBL UTI  #Mild right hydroureteronephrosis   #Recent suprapubic cath placement - 8/2024- s/p exchange 9/22  #S/p partial cystotomy with complex bladder closure and omental flap- 7/2024  - Blood cx growing ESBL Klebsiella pneumoniae on 9/21, repeat Bcx 9/24 to make sure she has cleared her bacteremia  - UCx >100k colonies mixed elisa  - On IV meropenem, deescalated to IV ertapenem on 9/23  - will likely consult ID to determine final course of abx given bacteremia without + UCx, duration of therapy. No fevers or leukocytosis.   - consider repeating renal US to see if resolution of mild R hydroureteronephrosis see on US 9/21  - Appreciate urology for their help with SPT exchange  - No leukocytosis, fever, lactic acidosis, no CVAT, no flank pain, low suspicion for perinephric abscess, will hold on CT w/con given new MADHU    #B/l LE edema, improving  #Anasarca  #Hypoalbuminemia  #MADHU on CKD stage II (baseline ~ 0.9)- likely ATN from bacteremia, improving  #Hypotonic hyponatremia- asymptomatic- hypervolemic  #Hypokalemia  #Hypomagnesemia  - Nephro consulted, appreciate their recs  - Strict I/O  - TTE with EF wnl, no other abnormality  - 24 hr urine protein elevated in 400s, HIV negative, C3-C4 wnl, hepatitis panel, lipid panel wnl, A1c low, BÁRBARA neg,   - Serum free light chain  with small monoclonal gamma protein, serum IF with possible monoclonal IgM immunoglobulin with no correlated light chain type, follow up on urine IF  - TSH wnl   - Patient not symptomatic with hyponatremia, likely >48hr, no indication for hypertonic saline  - Lymphedema service consulted, pressure stocking, leg elevation  - consider diuresis if edema does not improve, as MADHU resolves  - on RN replacement protocol for hypoK and hypoMag    #Chronic microcytic/normocytic anemia  #Thrombocytopenia  Plt wnl 2 weeks ago and now low at 40-50K, she has been started on her Taxol/Carbo infusions and this could be BM suppression from chemo, will monitor, no e/o bleeding so far  - check ferritin, iron studies    # Chronic back pain- on home oxycodone per OP pain specialist- resumed PTA oxycodone 15 q6hr PRN    # Abdominal pain  Reports lower abdominal pain since her gallbladder was removed a month and a half ago but there are no records of this, however she did have XL, SNEHA, BSO, EDIE and cystotomy repair with suprapubic bladder insertion and omental flap on 7/12/24 which may be what she is referring to. On chart review she has abdominal pain going back several months.   - continue PTA pain management for her chronic pain  - consider further investigation if any changes  - zofran PRN for nausea    # Atrial fibrillation - on home Eliquis, Hb trending down last 48hr, held on resuming AC, but no concern for blood loss. As renal function continues to improve anticipate will resume AC 9/25.  - resume PTA atenolol 25 mg daily on 9/25    # insomnia  - melatonin at bedtime  - space v/s to q8H, allow interrupted sleep  - note PTA on Ambien PRN      Chronic conditions:  # History of REMA-III, s/p laser therapy  # History of cevical cancer, s/p radiation therapy  # History of gastric bypass   # Hypertension   # Osteoarthritis  #Paranoid schizophrenia- pt not taking meds  #RAFAEL- pt not taking meds    Dispo: PT/OT - OT home with assist vs home  with home OT vs TCU      Diet: Renal Diet (non-dialysis)    DVT Prophylaxis: Pneumatic Compression Devices  Shahid Catheter: Not present  Lines: PRESENT      Port a Cath 08/26/24 Single Lumen Right Chest wall-Site Assessment: WDL      Cardiac Monitoring: ACTIVE order. Indication: Acute decompensated heart failure (48 hours)  Code Status: Full Code            Diet: Renal Diet (non-dialysis)    DVT Prophylaxis: holding PTA DOAC  Shahid Catheter: Not present  Lines: PRESENT      Port a Cath 08/26/24 Single Lumen Right Chest wall-Site Assessment: WDL      Cardiac Monitoring: None  Code Status: Full Code      Clinically Significant Risk Factors        # Hypokalemia: Lowest K = 3.2 mmol/L in last 2 days, will replace as needed  # Hyponatremia: Lowest Na = 128 mmol/L in last 2 days, will monitor as appropriate    # Hypomagnesemia: Lowest Mg = 1.5 mg/dL in last 2 days, will replace as needed   # Hypoalbuminemia: Lowest albumin = 2.2 g/dL at 9/24/2024  3:14 AM, will monitor as appropriate   # Thrombocytopenia: Lowest platelets = 43 in last 2 days, will monitor for bleeding   # Hypertension: Noted on problem list               # Financial/Environmental Concerns: none         Disposition Plan     Medically Ready for Discharge: Anticipated in 2-4 Days           Jeanette Cha MD (Sally)  Internal Medicine/Pediatrics  Hospitalist    Hospitalist Service, GOLD TEAM 9  [facility]  Securely message with [employer] (more info)  Text page via [facility] Paging/Directory   See signed in provider for up to date coverage information  ______________________________________________________________________    Interval History   No acute events overnight, didn't sleep well over the last two nights so pretty tired today. C/o muscle spasms/twitching maybe due to low mag/K. Abd pain chronic, not worse. Wants to try some miralax today. Mild nausea, asking for zofran. She thinks her edema is better. No sob or  chest pain.     Physical Exam   Vital Signs: Temp: 97.8  F (36.6  C) Temp src: Oral BP: 124/87 Pulse: 79   Resp: 14 SpO2: 100 % O2 Device: None (Room air)    Weight: 137 lbs 8 oz    General: awake, alert, in no acute distress  HEENT: NCAT, sclera anicteric, no nasal discharge, MMM  CV: RRR, no murmurs noted  Resp: CTAB, no increased WOB  Abd: Soft, mildly tender to palpation in lower abdomen, nondistended, +BS, no rebound or guarding  MSK: pitting peripheral edema could not fully examine due to lymphedema wraps, extremities warm and well perfused  Skin: warm, dry, no jaundice  Neuro:  Alert and oriented x4.      Medical Decision Making       50 MINUTES SPENT BY ME on the date of service doing chart review, history, exam, documentation & further activities per the note.      Data   ------------------------- PAST 24 HR DATA REVIEWED -----------------------------------------------    I have personally reviewed the following data over the past 24 hrs:    8.1  \   7.9 (L)   / 49 (LL)     130 (L) 100 21.7 /  87   4.0 21 (L) 1.24 (H) \     ALT: 6 AST: 9 AP: 80 TBILI: 0.2   ALB: 2.2 (L) TOT PROTEIN: 5.0 (L) LIPASE: N/A       Imaging results reviewed over the past 24 hrs:   No results found for this or any previous visit (from the past 24 hour(s)).

## 2024-09-24 NOTE — PROGRESS NOTES
[facility]    Medicine Progress Note - Hospitalist Service, GOLD TEAM 9    Date of Admission:  9/20/2024    Assessment & Plan   69-year-old female who presented to the ED due to concern for UTI and confusion.  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        #Catheter-associated UTI  #ESBL K pneumoniae bacteremia  #Hx of Klebsiella pna ESBL UTI  #Mild right hydroureteronephrosis   #Recent suprapubic cath placement - 8/2024- s/p exchange 9/22  #S/p partial cystotomy with complex bladder closure and omental flap- 7/2024  - Blood cx growing K. pna  - Follow up on urine cx  - Bases on sensitivity report, will de-escalate to ertapenem 1gm daily, given her complex  underlying history, she probably will need total of 14 days treatment, she has not been febrile nor with leukocytosis while IP, no CVAT, no PO option on sensitivities so probably will need to stay IP for her IV AB treatment  - Appreciate urology for their help with SPT exchange  - No leukocytosis, fever, lactic acidosis, no CVAT, no flank pain, low suspicion for perinephric abscess, will hold on CT w/con given new MADHU    #B/l LE edema  #Anasarca  #Hypoalbuminemia  #MADHU on CKD stage II (baseline ~ 0.9)- likely ATN from bacteremia  #Hypotonic hyponatremia- asymptomatic- hypervolemic  #Hypokalemia  - Strict I/O  - TTE with EF wnl, no other abnormality  - 24 hr urine protein elevated in 400s, HIV negative, C3-C4 wnl, hepatitis panel, lipid panel wnl, A1c low, BÁRBARA neg,   - Serum free light chain with small monoclonal gamma protein, follow up on serum and urine IF  - TSH wnl   - Patient not symptomatic with  hyponatremia, likely >48hr, no indication for hypertonic saline  - Nephro consulted, appreciate their recs  - Lymphedema service consulted, pressure stocking, leg elevation    #Chronic microcytic/normocytic anemia  #Thrombocytopenia  - Plt wnl 2 weeks ago and now low at 40-50K, she has been started on her Taxol/Carbo infusions and this could be BM suppression from chemo, will monitor, no e/o bleeding so far  - Will get MT labs    Chronic conditions:  # History of REMA-III, s/p laser therapy  # History of cevical cancer, s/p radiation therapy  # History of gastric bypass   # Atrial fibrillation - on home Eliquis, Hb trending down last 48hr, will hold on resuming AC  # Hypertension   # Chronic pain- on home oxycodone per OP pain specialist- resumed PTA oxycodone 15 q6hr PRN  # Osteoarthritis  #Paranoid schizophrenia- pt not taking meds  #RAFAEL- pt not taking meds        Diet: Renal Diet (non-dialysis)    DVT Prophylaxis: Pneumatic Compression Devices  Shahid Catheter: Not present  Lines: PRESENT      Port a Cath 08/26/24 Single Lumen Right Chest wall-Site Assessment: WDL      Cardiac Monitoring: ACTIVE order. Indication: Acute decompensated heart failure (48 hours)  Code Status: Full Code            Diet: Renal Diet (non-dialysis)    DVT Prophylaxis: Pneumatic Compression Devices  Shahid Catheter: Not present  Lines: PRESENT      Port a Cath 08/26/24 Single Lumen Right Chest wall-Site Assessment: WDL      Cardiac Monitoring: None  Code Status: Full Code      Clinically Significant Risk Factors         # Hyponatremia: Lowest Na = 123 mmol/L in last 2 days, will monitor as appropriate    # Hypomagnesemia: Lowest Mg = 1.6 mg/dL in last 2 days, will replace as needed   # Hypoalbuminemia: Lowest albumin = 2.2 g/dL at 9/23/2024  3:05 AM, will monitor as appropriate   # Thrombocytopenia: Lowest platelets = 43 in last 2 days, will monitor for bleeding   # Hypertension: Noted on problem list               # Financial/Environmental  Concerns: none         Disposition Plan     Medically Ready for Discharge: Anticipated in 2-4 Days      Royal Hwang MD  Hospitalist Service, GOLD TEAM 9  [facility]  Securely message with [employer] (more info)  Text page via [employer] Paging/Directory   See signed in provider for up to date coverage information  ______________________________________________________________________    Interval History   Overnight, no acute event, this morning patient in good spirit, discussed the treatment plan with her, no major complaint from her end, overall doing well.     Physical Exam   Vital Signs: Temp: 97.7  F (36.5  C) Temp src: Oral BP: 130/85 Pulse: 99   Resp: 16 SpO2: 100 % O2 Device: None (Room air)    Weight: 137 lbs 8 oz    Gen: A&Ox4, lying comfortably on bed, in no distress, doing well  Chest: R subclavian chemo port     Abd: soft, non-tender, non-distended, suprapubic cath present  Ext: 3+ edema on LE up to pelvic area b/l, warm, no skin lesion      Medical Decision Making       40 MINUTES SPENT BY ME on the date of service doing chart review, history, exam, documentation & further activities per the note.      Data     I have personally reviewed the following data over the past 24 hrs:    6.7  \   7.6 (L)   / 43 (LL)     130 (L) 98 21.4 /  91   3.5; 3.5 20 (L) 1.52 (H) \     ALT: 7 AST: 11 AP: 73 TBILI: 0.2   ALB: 2.2 (L) TOT PROTEIN: 4.7 (L) LIPASE: N/A       Imaging results reviewed over the past 24 hrs:   Recent Results (from the past 24 hour(s))   Echo Complete   Result Value    LVEF  60-65%    Narrative    [id number]  [id number]  [id number]  [id number]^CLEO^ROYAL     [facility],Holiday  Echocardiography Laboratory  [address] [place] [id number]     Name: QUIQUE WILLIS  MRN: [id number]  : 1955  Study Date: 2024 09:11 AM  Age: 69 yrs  Gender: Female  Patient Location: [facility]  Reason For Study:  Heart Failure  Ordering Physician: ROYAL GALLO  Performed By: Heather Escobar     BSA: 1.6 m2  Height: 63 in  Weight: 137 lb  HR: 90  BP: 142/97 mmHg  ______________________________________________________________________________  Procedure  Complete Portable Echo Adult. Technically difficult study.Extremely poor  acoustic windows.  ______________________________________________________________________________  Interpretation Summary  Global and regional left ventricular function is normal with an EF of 60-65%.  Global right ventricular function is normal.  No significant valvular abnormalities were noted.  This study was compared with the study from 7/10/24 .  No significant changes noted.  ______________________________________________________________________________  Left Ventricle  Global and regional left ventricular function is normal with an EF of 60-65%.  Left ventricular size is normal. Left ventricular wall thickness cannot  evaluate. Left ventricular diastolic function is indeterminate.     Right Ventricle  The right ventricle is normal size. Global right ventricular function is  normal.     Mitral Valve  Mild mitral annular calcification is present.     Aortic Valve  The valve leaflets are not well visualized. On Doppler interrogation, there is  no significant stenosis or regurgitation.     Tricuspid Valve  The peak velocity of the tricuspid regurgitant jet is not obtainable.  Pulmonary artery systolic pressure cannot be assessed.     Pulmonic Valve  The pulmonic valve cannot be assessed.     Vessels  The aorta root is normal. The inferior vena cava cannot be assessed.     Pericardium  No pericardial effusion is present.     Compared to Previous Study  This study was compared with the study from 7/10/24 . No significant changes  noted.     ______________________________________________________________________________  MMode/2D Measurements & Calculations  LVOT diam: 2.2 cm  LVOT area: 3.8  cm2  TAPSE: 1.7 cm     Doppler Measurements & Calculations  MV E max steve: 53.8 cm/sec  MV A max steve: 109.3 cm/sec  MV E/A: 0.49  MV max P.4 mmHg  MV mean P.8 mmHg  MV V2 VTI: 17.7 cm  E/E' av.7  Lateral E/e': 6.2  Medial E/e': 13.1  RV S Steve: 12.4 cm/sec     ______________________________________________________________________________  Report approved by: Erick Dunn 2024 11:43 AM

## 2024-09-24 NOTE — PROGRESS NOTES
Home Infusion  Received referral from Jaylin Villar  RNCC for IV ABX.  Benefits verified.  Patient has [employer] and has coverage should be at 100%, however there may be a copay upon dispense. Called and spoke with Joy, indra to review home infusion services, review benefits and offer choice of providers.  Patient would like to remain in the [facility] system and will use [facility] for home infusion.  Confirmed discharge address, phone, and emergency contact information. Patient has ESBL but daughter denies any recent travel in the house hold. Confirmed allergies. [facility] home health agency has been seeing the patient.     Joy and her cousin are willing to learn and manage home IV therapy.  Questions answered.  Plan for [email or web address] to provide home care services after discharge.    [facility] will continue to follow until discharge and update pt once final orders are determined.    Thank you for the referral    Nadeem Topete LPN, Coordinator   [facility]   [email or web address]  Office: [phone]

## 2024-09-25 ENCOUNTER — APPOINTMENT (OUTPATIENT)
Dept: OCCUPATIONAL THERAPY | Facility: CLINIC | Age: 69
End: 2024-09-25
Payer: COMMERCIAL

## 2024-09-25 ENCOUNTER — APPOINTMENT (OUTPATIENT)
Dept: PHYSICAL THERAPY | Facility: CLINIC | Age: 69
End: 2024-09-25
Attending: STUDENT IN AN ORGANIZED HEALTH CARE EDUCATION/TRAINING PROGRAM
Payer: COMMERCIAL

## 2024-09-25 LAB
ANION GAP SERPL CALCULATED.3IONS-SCNC: 6 MMOL/L (ref 7–15)
BUN SERPL-MCNC: 18.5 MG/DL (ref 8–23)
CALCIUM SERPL-MCNC: 7.9 MG/DL (ref 8.8–10.4)
CHLORIDE SERPL-SCNC: 104 MMOL/L (ref 98–107)
CREAT SERPL-MCNC: 1.02 MG/DL (ref 0.51–0.95)
EGFRCR SERPLBLD CKD-EPI 2021: 59 ML/MIN/1.73M2
ERYTHROCYTE [DISTWIDTH] IN BLOOD BY AUTOMATED COUNT: 21.5 % (ref 10–15)
GLUCOSE SERPL-MCNC: 82 MG/DL (ref 70–99)
HCO3 SERPL-SCNC: 23 MMOL/L (ref 22–29)
HCT VFR BLD AUTO: 23.3 % (ref 35–47)
HGB BLD-MCNC: 7.7 G/DL (ref 11.7–15.7)
MAGNESIUM SERPL-MCNC: 2 MG/DL (ref 1.7–2.3)
MAGNESIUM SERPL-MCNC: 4.1 MG/DL (ref 1.7–2.3)
MCH RBC QN AUTO: 28.5 PG (ref 26.5–33)
MCHC RBC AUTO-ENTMCNC: 33 G/DL (ref 31.5–36.5)
MCV RBC AUTO: 86 FL (ref 78–100)
PHOSPHATE SERPL-MCNC: 2.6 MG/DL (ref 2.5–4.5)
PLATELET # BLD AUTO: 53 10E3/UL (ref 150–450)
POTASSIUM SERPL-SCNC: 3.8 MMOL/L (ref 3.4–5.3)
RBC # BLD AUTO: 2.7 10E6/UL (ref 3.8–5.2)
SODIUM SERPL-SCNC: 133 MMOL/L (ref 135–145)
WBC # BLD AUTO: 7.1 10E3/UL (ref 4–11)

## 2024-09-25 PROCEDURE — 250N000011 HC RX IP 250 OP 636: Performed by: STUDENT IN AN ORGANIZED HEALTH CARE EDUCATION/TRAINING PROGRAM

## 2024-09-25 PROCEDURE — 83735 ASSAY OF MAGNESIUM: CPT

## 2024-09-25 PROCEDURE — 99233 SBSQ HOSP IP/OBS HIGH 50: CPT | Performed by: STUDENT IN AN ORGANIZED HEALTH CARE EDUCATION/TRAINING PROGRAM

## 2024-09-25 PROCEDURE — 97140 MANUAL THERAPY 1/> REGIONS: CPT | Mod: GO

## 2024-09-25 PROCEDURE — 120N000002 HC R&B MED SURG/OB UMMC

## 2024-09-25 PROCEDURE — 86335 IMMUNFIX E-PHORSIS/URINE/CSF: CPT | Performed by: PATHOLOGY

## 2024-09-25 PROCEDURE — 250N000013 HC RX MED GY IP 250 OP 250 PS 637: Performed by: STUDENT IN AN ORGANIZED HEALTH CARE EDUCATION/TRAINING PROGRAM

## 2024-09-25 PROCEDURE — 97530 THERAPEUTIC ACTIVITIES: CPT | Mod: GO

## 2024-09-25 PROCEDURE — 97116 GAIT TRAINING THERAPY: CPT | Mod: GP | Performed by: PHYSICAL THERAPIST

## 2024-09-25 PROCEDURE — 80048 BASIC METABOLIC PNL TOTAL CA: CPT | Performed by: STUDENT IN AN ORGANIZED HEALTH CARE EDUCATION/TRAINING PROGRAM

## 2024-09-25 PROCEDURE — 85027 COMPLETE CBC AUTOMATED: CPT | Performed by: STUDENT IN AN ORGANIZED HEALTH CARE EDUCATION/TRAINING PROGRAM

## 2024-09-25 PROCEDURE — 97530 THERAPEUTIC ACTIVITIES: CPT | Mod: GP | Performed by: PHYSICAL THERAPIST

## 2024-09-25 PROCEDURE — 84100 ASSAY OF PHOSPHORUS: CPT | Performed by: STUDENT IN AN ORGANIZED HEALTH CARE EDUCATION/TRAINING PROGRAM

## 2024-09-25 PROCEDURE — 83735 ASSAY OF MAGNESIUM: CPT | Performed by: STUDENT IN AN ORGANIZED HEALTH CARE EDUCATION/TRAINING PROGRAM

## 2024-09-25 PROCEDURE — 97161 PT EVAL LOW COMPLEX 20 MIN: CPT | Mod: GP | Performed by: PHYSICAL THERAPIST

## 2024-09-25 PROCEDURE — 86335 IMMUNFIX E-PHORSIS/URINE/CSF: CPT | Mod: 26 | Performed by: PATHOLOGY

## 2024-09-25 PROCEDURE — 250N000013 HC RX MED GY IP 250 OP 250 PS 637

## 2024-09-25 PROCEDURE — 99255 IP/OBS CONSLTJ NEW/EST HI 80: CPT | Mod: 24 | Performed by: INTERNAL MEDICINE

## 2024-09-25 PROCEDURE — 87040 BLOOD CULTURE FOR BACTERIA: CPT | Performed by: STUDENT IN AN ORGANIZED HEALTH CARE EDUCATION/TRAINING PROGRAM

## 2024-09-25 RX ADMIN — Medication 3 MG: at 21:45

## 2024-09-25 RX ADMIN — POLYETHYLENE GLYCOL 3350 17 G: 17 POWDER, FOR SOLUTION ORAL at 21:45

## 2024-09-25 RX ADMIN — ATENOLOL 25 MG: 25 TABLET ORAL at 08:23

## 2024-09-25 RX ADMIN — DICLOFENAC SODIUM 2 G: 10 GEL TOPICAL at 21:47

## 2024-09-25 RX ADMIN — Medication 1 TABLET: at 21:45

## 2024-09-25 RX ADMIN — HEPARIN, PORCINE (PF) 10 UNIT/ML INTRAVENOUS SYRINGE 5 ML: at 12:37

## 2024-09-25 RX ADMIN — ERTAPENEM SODIUM 1 G: 1 INJECTION, POWDER, LYOPHILIZED, FOR SOLUTION INTRAMUSCULAR; INTRAVENOUS at 08:13

## 2024-09-25 RX ADMIN — APIXABAN 2.5 MG: 2.5 TABLET, FILM COATED ORAL at 12:37

## 2024-09-25 RX ADMIN — APIXABAN 2.5 MG: 2.5 TABLET, FILM COATED ORAL at 20:15

## 2024-09-25 RX ADMIN — OXYCODONE HYDROCHLORIDE 15 MG: 5 TABLET ORAL at 12:49

## 2024-09-25 RX ADMIN — ACETAMINOPHEN 650 MG: 325 TABLET ORAL at 02:19

## 2024-09-25 RX ADMIN — HEPARIN, PORCINE (PF) 10 UNIT/ML INTRAVENOUS SYRINGE 5 ML: at 06:20

## 2024-09-25 RX ADMIN — OXYCODONE HYDROCHLORIDE 15 MG: 5 TABLET ORAL at 02:19

## 2024-09-25 RX ADMIN — OXYCODONE HYDROCHLORIDE 15 MG: 5 TABLET ORAL at 20:15

## 2024-09-25 ASSESSMENT — ACTIVITIES OF DAILY LIVING (ADL)
ADLS_ACUITY_SCORE: 37

## 2024-09-25 NOTE — PROGRESS NOTES
A/o X4; forgetful at times  Vss on RA.  Abd pain rated 7-8/10 mgd w/ PRN  meds. Given heat pack and massage  in the AM for cramps.  Denies n/v, dyspnea, Chest pain or N/T.   Renal diet. Needs help ordering  BLE edema +3. Lymphedema wraps on; legs elevated..Using cream for wound care.   Port infusing @[facility] and  abx intermittently. Suprapubic cath w/ AUOP.   LBM 09/22  Labs reviewed, mg 4.1. Notified provider and recheck ordered to confirm before new intervention as pt Mg has been running low.  Ax1 w/ GB and walker.  Pt had bath today.  Continue to follow POC  and notify provider of changes

## 2024-09-25 NOTE — PROGRESS NOTES
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    ESBL K pneumoniae bacteremia  ?Catheter-associated UTI  Hx of Klebsiella pna ESBL UTI  Mild right hydroureteronephrosis   Recent suprapubic cath placement - 8/2024- s/p exchange 9/22  S/p partial cystotomy with complex bladder closure and omental flap- 7/2024  - Blood cx growing ESBL Klebsiella pneumoniae on 9/21, repeat Bcx 9/24 to make sure she has cleared her bacteremia  - UCx >100k colonies mixed eilsa  - On IV meropenem, deescalated to IV ertapenem on 9/23  - Consult ID to determine final course of abx given bacteremia without + UCx, duration of therapy. No fevers or leukocytosis.   - consider repeating renal US to see if resolution of mild R hydroureteronephrosis see on US 9/21  - Appreciate urology for their help with SPT exchange  - No leukocytosis, fever, lactic acidosis, no CVAT, no flank pain, low suspicion for perinephric abscess, will hold on CT w/con given new MADHU    B/l LE edema, improving  Anasarca  Hypoalbuminemia  MADHU on CKD stage II (baseline ~ 0.9)- likely ATN from bacteremia, improving  Hypotonic hyponatremia- asymptomatic, improving  Hypokalemia  Hypomagnesemia  - Nephro consulted, appreciate their recs  - Strict I/O  - TTE with EF wnl, no other abnormality  - 24 hr urine protein elevated in 400s, HIV negative, C3-C4 wnl, hepatitis panel, lipid panel wnl, A1c low, BÁRBARA neg,   - Serum  free light chain with small monoclonal gamma protein, serum IF with possible monoclonal IgM immunoglobulin with no correlated light chain type, follow up on urine IF  - TSH wnl   - Patient not symptomatic with hyponatremia, likely >48hr, no indication for hypertonic saline  - Lymphedema service consulted, pressure stocking, leg elevation  - on RN replacement protocol for hypoK and hypoMag    Chronic microcytic/normocytic anemia  Thrombocytopenia  Plt wnl 2 weeks ago and now low at 40-50K, she has been started on her Taxol/Carbo infusions and this could be BM suppression from chemo, will monitor, no e/o bleeding so far  - checked ferritin, iron studies    # Chronic back pain- on home oxycodone per OP pain specialist- resumed PTA oxycodone 15 q6hr PRN    # Abdominal pain  Reports lower abdominal pain since her gallbladder was removed a month and a half ago but there are no records of this, however she did have XL, SNEHA, BSO, EDIE and cystotomy repair with suprapubic bladder insertion and omental flap on 7/12/24 which may be what she is referring to. On chart review she has abdominal pain going back several months.   - continue PTA pain management for her chronic pain  - consider further investigation if any changes  - zofran PRN for nausea    # Atrial fibrillation - on home Eliquis, Hb trending down last 48hr, held on resuming AC, but no concern for blood loss.  - resume PTA atenolol 25 mg daily on 9/25  - resume apixaban at lower dose 2.5 mg BID (due to renal function and thrombocytopenia)    # insomnia  - melatonin at bedtime  - space v/s to q8H, allow interrupted sleep  - note PTA on Ambien PRN      Chronic conditions:  # History of REMA-III, s/p laser therapy  # History of cevical cancer, s/p radiation therapy  # History of gastric bypass   # Hypertension   # Osteoarthritis  #Paranoid schizophrenia- pt not taking meds  #RAFAEL- pt not taking meds    Dispo: PT/OT - OT home with assist vs home with home OT vs TCU, PT home  with assist vs home with home PT vs TCU. Pt declines TCU. Has home cares, PCA services, we are working on getting her a wheelchair.     F/up: has oncology f/up with labs 10/3 (rescheduled from 9/25)    Diet: Renal Diet (non-dialysis)    DVT Prophylaxis: Pneumatic Compression Devices  Shahid Catheter: Not present  Lines: PRESENT      Port a Cath 08/26/24 Single Lumen Right Chest wall-Site Assessment: WDL      Cardiac Monitoring: ACTIVE order. Indication: Acute decompensated heart failure (48 hours)  Code Status: Full Code            Diet: Renal Diet (non-dialysis)    DVT Prophylaxis: DOAC  Shahid Catheter: Not present  Lines: PRESENT      Port a Cath 08/26/24 Single Lumen Right Chest wall-Site Assessment: WDL      Cardiac Monitoring: None  Code Status: Full Code      Clinically Significant Risk Factors        # Hypokalemia: Lowest K = 3.2 mmol/L in last 2 days, will replace as needed  # Hyponatremia: Lowest Na = 130 mmol/L in last 2 days, will monitor as appropriate    # Hypomagnesemia: Lowest Mg = 1.5 mg/dL in last 2 days, will replace as needed   # Hypoalbuminemia: Lowest albumin = 2.2 g/dL at 9/24/2024  3:14 AM, will monitor as appropriate   # Thrombocytopenia: Lowest platelets = 49 in last 2 days, will monitor for bleeding   # Hypertension: Noted on problem list               # Financial/Environmental Concerns: none         Disposition Plan     Medically Ready for Discharge: Anticipated Tomorrow           Jeanette Cha MD (Sally)  Internal Medicine/Pediatrics  Hospitalist    Hospitalist Service, GOLD TEAM 9  [facility]  Securely message with Playroom (more info)  Text page via [facility] Paging/Directory   See signed in provider for up to date coverage information  ______________________________________________________________________    Interval History   No acute events overnight. Got better sleep. Abd pain persists but not changed. Good UOP.     Physical Exam   Vital  Signs: Temp: 97.6  F (36.4  C) Temp src: Oral BP: 94/74 Pulse: 86   Resp: 16 SpO2: 100 % O2 Device: None (Room air)    Weight: 137 lbs 8 oz    General: awake, alert, in no acute distress  HEENT: NCAT, sclera anicteric, no nasal discharge, MMM  CV: RRR, no murmurs noted  Resp: CTAB, no increased WOB  Abd: Soft, mildly tender to palpation in lower abdomen, nondistended, +BS, no rebound or guarding  MSK: pitting peripheral edema could not fully examine due to lymphedema wraps, extremities warm and well perfused  Skin: warm, dry, no jaundice  Neuro:  Alert and oriented x4.      Medical Decision Making       50 MINUTES SPENT BY ME on the date of service doing chart review, history, exam, documentation & further activities per the note.      Data   ------------------------- PAST 24 HR DATA REVIEWED -----------------------------------------------    I have personally reviewed the following data over the past 24 hrs:    7.1  \   7.7 (L)   / 53 (L)     133 (L) 104 18.5 /  82   3.8 23 1.02 (H) \     Ferritin:  N/A % Retic:  N/A LDH:  N/A       Imaging results reviewed over the past 24 hrs:   No results found for this or any previous visit (from the past 24 hour(s)).

## 2024-09-25 NOTE — PROGRESS NOTES
09/25/24 1108   Appointment Info   Signing Clinician's Name / Credentials (PT) Anita Arrnigton, PT, DPT   Living Environment   People in Home grandchild(vernell)   Current Living Arrangements house   Home Accessibility stairs to enter home   Number of Stairs, Main Entrance 6   Transportation Anticipated family or friend will provide   Living Environment Comments Pt lives in a house with her adult grandchildren, reports 6 ABBIE states there is railing but she would rather hang onto her grandson for balance support. Tub shower combo.   Self-Care   Usual Activity Tolerance moderate   Current Activity Tolerance fair   Regular Exercise Yes   Activity/Exercise Type other (see comments)   Exercise Amount/Frequency 2 times/wk   Equipment Currently Used at Home commode chair;shower chair;walker, rolling   Activity/Exercise/Self-Care Comment Pt reports she is able to ambulate in house with fww and complete basic  ADLs with AE listed above, mod-I for most part. Occasionally gets assistance with bathing from granddaughter. Intermittent assist from grandson for getting out of bed and up/down 6 steps to enter home.   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   Existing Precautions/Restrictions fall   Cognition   Follows Commands (Cognition) WFL   Integumentary/Edema   Integumentary/Edema Comments BLE  edema, wrapped with gradient compression bandages by OT   Range of Motion (ROM)   ROM Comment BLE ROM WFL- slightly limited by BLE edema   Strength (Manual Muscle Testing)   Strength Comments BLE strength ankle df/pf and knee extension >3/5. Pt does report increased LE weakness over the past week.   Bed Mobility   Comment, (Bed Mobility) supine>sitting EOB mod I wiht  HOb elevated to 45 degree incline. Pt uses UEs to lift/reposition LEs towards EOB   Transfers   Comment, (Transfers) Sit>stand unsucessful from EOB initially when attempting ot pull up on FWW. Sit>stand with CGA from elevated EOB, one hand pushing from EOB and one hand on FWW handle.   Gait/Stairs (Locomotion)   Comment, (Gait/Stairs) Pt ambulated 3 ft with adult FWW, CGA. Pt ambulates with flexed posture, FWW pushed ahead of her, decreased step length/foot clearance and heel strike to BLEs   Balance   Balance Comments Impaired-pt needs BUE support on FWW and CGA for dynamic balance 2/2 fall risk   Clinical Impression   Criteria for Skilled Therapeutic Intervention Yes, treatment indicated   PT Diagnosis (PT) impaired functional mobility   Influenced by the following impairments LE weakness, impaired balance, decreased activity tolerance   Functional limitations due to impairments difficulty with bed mobility, transfers, ambulation, stairs   Clinical Presentation (PT Evaluation Complexity) stable   Clinical Presentation Rationale medical status, level of impairments   Clinical Decision Making (Complexity) low complexity   Planned Therapy Interventions (PT) balance training;bed mobility training;gait training;home exercise program;home program guidelines;progressive activity/exercise;transfer training;strengthening;postural re-education;patient/family education;stair training   Risk & Benefits of therapy have been explained evaluation/treatment results reviewed;care plan/treatment goals reviewed;risks/benefits reviewed;participants voiced agreement with  care plan;current/potential barriers reviewed;participants included;patient   PT Total Evaluation Time   PT Chevy, Low Complexity Minutes (64523) 5   Physical Therapy Goals   PT Frequency 6x/week   PT Predicted Duration/Target Date for Goal Attainment 10/09/24   PT Goals Bed Mobility;Transfers;Gait;Stairs   PT: Bed Mobility Independent;Supine to/from sit   PT: Transfers Independent;Sit to/from stand   PT: Gait Modified independent;Rolling walker;100 feet   PT: Stairs 6 stairs;Rail on left;Supervision/stand-by assist   PT Discharge Planning   PT Plan PT PLAN: Progress ambulation distance, step ups/stairs, LE strength   PT Discharge Recommendation (DC Rec) Transitional Care Facility;home with assist;home with home care physical therapy   PT Rationale for DC Rec Pt needing Ax1 for transfers and ambulation short distances. Increased dizziness and fatigue limiting ability to trial stairs to ensure safe discharge plan to home. Pt would benefit from short TCU stay to improve LE strength and activity tolerance to improve IND nad decrease caregiver burden. Pt declines PT, states she wants to go home with family assist and resume home PT/OT   PT Brief overview of current status Ax1 with FWW and GB   Total Session Time   Total Session Time (sum of timed and untimed services) 5

## 2024-09-25 NOTE — PLAN OF CARE
Goal Outcome Evaluation:      Plan of Care Reviewed With: patient    [id number]    Neuro: A&O x4, forgetful at times.   Behavior: calm and cooperative   Pain: abdominal pain managed with Tylenol and oxycodone.  Vital; Temp: 97.6  F (36.4  C) Temp src: Oral BP: 117/78 Pulse: 78   Resp: 16 SpO2: 100 % O2 Device: None (Room air)     GI/: suprapubic cath with adequate urine out. NO BM but passing gas. Denies nausea/vomit. Bowel sounds are audible    Skin: bilateral edema on legs. Skin tears on right calf, and thigh.  Sacral area care done today and a new mapalex applied. Port in place with good dressing.  Diet: renal diet. Ate and tolerated      Removed lymphedema wrap after reported pain. Patient reported  no pain in legs now. Notified team.  Patient would like to remain in the [facility] system and will use [facility] for home infusion. plan for [facility] access hospital [facility] to provide home care services after discharg

## 2024-09-25 NOTE — CONSULTS
General Infectious Disease Service Consultation - [place] Team  Patient:  Alis Hartman  Date of birth 1955  Medical record number [id number]  Date of Admission: 9/20/2024  Date of Visit:  9/25/2024  Requesting Provider: Jeanette Cha         Assessment and Recommendations:     Problem List:  ESBL Klebsiella Bacteremia (9/21 positive blood culture).   Cervical cancer undergoing radiation treatment. Chemotherapy cycles approximately every 21 days. Last one was 9/8 next one is 10/11.   carboplatin/paclitaxel   Hx of gastric bypass  CKD3 noted via chart review (5/2024)   Anemia and thrombocytopenia - likely due to bone marrow suppression from chemotherapy.     Discussion:  Aranza is a 69 year old female with a past medical history of endocervical cancer treated with radiation, hx of bladder resection w/ suprapubic catheter, hx of gastric bypass, afib, HTN, and schizophrenia who was ultimately diagnosed with Klebsiella pneumonia ESBL bacteremia from a blood culture on 9/21. She had another blood culture drawn on 9/24 which is negative thus far. It is likely her bacteremia seeded from the urine with the high polymicrobial burden cultured from the urine.     Since then she has been treated with IV meropenem (9/21-9/23) and IV ertapenem (9/23-present). Since starting treatment Aranza has improved clinically and the team is discussing discharge for her soon. The ertapenem will cover the ESBL Klebsiella. The question to the ID team was how long to treat this patient for. Given her infection is not complicated and she has no physical exam or ROS findings of disseminated infection we will treat for the next 14 days. This is a slightly longer course than the typically recommended 7-10 days for an uncomplicated bacteremia due to her multiple wound sites. Her sacral wound, suprapubic catheter site wound and the small wound on her elbow are all mostly healed and likely did not contribute to this infection.       Plan:  Treat with 14 days of IV Ertapenem. Day 1 being 9/22. Full course (9/22-10/3)   Follow up outpatient with infectious disease  Either place a PICC line or use current port site for treatment. Per oncology.     Recommendations discussed with supervising attending physician, Dr. Sanchez.     Thank you for this consult. The General ID team will continue to follow this patient. Please feel free to call with any questions.     Meagan Kiser MS4   [employer] [facility] [employer]     Attestation:  I saw and evaluated this patient.  I discussed this patient with Meagan Kiser, MS4 , and agree with the findings and plan in this note. I have reviewed today's vital signs, medications, labs and imaging.    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 she denies fever at home. Found to have Klebsiella pneumoniae ESBL on 9/21/24. normal WBC and afebrile through out this hospital stay. She was initially on Ceftriaxone and switched to Meropenem and changed to Ertapenem on 9/24/24. Repeat blood culture on 9/24/24 is negative so far.     She is feeling better. walks with a walker and has started walking in her room. still feeling weak but getting better.     - Kleb pneumoniae ESBL bacteremia, likely source UTI   - history of  Kleb pneumoniae ESBL from abdominal wound in July 2024  - history of Kleb pneumoniae UTI/colonization with chronic supra pubic catheter, last changed 3 days ago.   - sacral wound - home health nurse is managing the wound     - chronic anemia  - acute thrombocytopenia   - MADHU on CKD - improving     Plan/Recommendation:   - repeat blood cx from the port   - follow-up blood cx, if blood cx is persistently positive, port will need to be removed. For now, there is no tenderness around it and per RN, it is working fine.   - continue Ertapenem 1 gram IV daily. would avoid oral bactrim  due to recent MADHU on CKD,anemia and low platelets   - duration of treatment 14 days   - primary team will ask Oncologist if it is OK to give Ertapenem via the port     Discussed recommendations with Primary team     ID will continue to follow     Total time on day of visit including chart review, counseling, documentation, and  coordination of care: 85min    Mathew Sanchez MD,M.Med.Sc.  Staff, Infectious Diseases  Pager: [phone]                  History of Present Illness:   Aranza is a 69 year old female with a past medical history of endocervical cancer treated with radiation, hx of bladder resection w/ suprapubic catheter, hx of gastric bypass, afib, HTN, and schizophrenia who presented to the ED on 9/20 complaining of leg swelling, confusion and fatigue. Ultimately her blood culture from 9/21 are positive for Klebsiella pneumonia ESBL. She had a urine culture done 9/21 that is currently growing >100k colonies of urogenital elisa. She had a CXR done on 9/20 that did not show any concern for infection. She got one PO dose of 500 mg Keflex in the ED (9/20). After which she was started on meropenem (9/20-9/23) which was deescalated to IV ertapenem on 9/23- 9/25.     Recently in July of 2024 Aranza was admitted for a UTI and superficial surgical site infection. She received TMP/SMX and metronidazole for 7 days at that time. She followed up outpatient on 9/4 where her suprapubic catheter was replaced.     Currently she has a resolving MADHU with a creatinine of 2.14 on 9/21 down trending to 1.02 on 9/25. She had an U/S of her kidneys on 9/21 which showed  mild right hydroureteronephrosis but was otherwise normal.     Patient has improved clinically and is almost ready for discharge.     Cultures:   9/21 Bcx - positive for ESBL Klebsiella   9/21 Urine culture - growing >100k mix of urogenital elisa   9/24 Bcx - no growth after 12 hours.     Antibiotics:   9/21 Ceftriaxone 1g   9/21- 9/23 Meropenem IV   9/24-9/25 Ertapenem internal jugular           Review of Systems:     CONSTITUTIONAL:  No fevers or chills  INTEGUMENTARY/SKIN: NEGATIVE for worrisome rashes, moles or lesions  ENT/MOUTH:  Negative for oral lesions and sore throat  RESPIRATORY:  Negative for cough and dyspnea  CARDIOVASCULAR:  Negative for chest pain,   GASTROINTESTINAL:  positive for abdominal pain, negative for nausea, vomiting, diarrhea and constipation  GENITOURINARY:  suprapubic catheter - site of abdominal pain.   MUSCULOSKELETAL: Negative for joint pain, swelling, motion restriction, negative for musculoskeletal pain  NEURO:  Negative for headache  PSYCHIATRIC: Negative for changes in mood or affect           Physical Exam:     /79   Pulse 80   Temp 97.6  F (36.4  C) (Oral)   Resp 16   Wt 62.4 kg (137 lb 8 oz)   SpO2 100%   BMI 24.36 kg/m       Physical Exam:    GENERAL:  Well-developed, well-nourished, not in acute distress.   HEAD: Normocephalic and atraumatic  EYES:  Eyes grossly normal to inspection  NECK:  Supple, no asymmetry, masses, or scars   LUNGS:  Clear to auscultation - no rales, rhonchi or wheezes  CARDIOVASCULAR:  Regular rate and rhythm, normal S1 S2, no S3 or S4, no murmur, click or rub,and peripheral pulses strong  ABDOMEN:  Soft, tender surrounding suprapubic catheter site, no rebound tenderness, no hepatosplenomegaly, no masses and bowel sounds normal  EXT: Extremities warm and without edema. Lower extremities wrapped   MS: No gross musculoskeletal defects noted  SKIN: Patient with sacral ulcer, small resolved wound on elbow and resolved wound surrounding  suprapubic catheter.   NEUROLOGIC:  Grossly nonfocal. Normal strength and tone, mentation intact and speech slow  PSYCHIATRIC: Mood stable, mentation appears normal, affect normal    Past Medical History:   Diagnosis Date    Atrial fibrillation (H)     Depression     Hypertension     Malignant neoplasm of endocervix (H)     Tx with radiation    Orthopnea 9/21/2024    Other chronic pain     Low back    Schizophrenia (H)     Urinary incontinence        Allergies   Allergen Reactions    Ibuprofen Nausea and Vomiting    Shrimp     Sulfa Antibiotics Rash     Patient started on bactrim 7/24/24 tolerating medication without rash on 7/25        Family History   Problem Relation Age of Onset    Heart Disease Father     Heart Failure Father     No Known Problems Brother     No Known Problems Brother     No Known Problems Brother     Pancreatitis Brother     Hypertension Sister     Peripheral Vascular Disease Sister     No Known Problems Sister     No Known Problems Son     No Known Problems Daughter        Reviewed and noncontributory.     Social History     Socioeconomic History    Marital status: Single     Spouse name: Not on file    Number of children: Not on file    Years of education: Not on file    Highest education level: Not on file   Occupational History    Not on file   Tobacco Use    Smoking status: Never    Smokeless tobacco: Never   Substance and Sexual Activity    Alcohol use: Not Currently    Drug use: No    Sexual activity: Not on file   Other Topics Concern    Parent/sibling w/ CABG, MI or angioplasty before 65F 55M? Not Asked   Social History Narrative    Not on file     Social Determinants of Health     Financial Resource Strain: Low Risk  (9/21/2024)    Financial Resource Strain     Within the past 12 months, have you or your family members you live with been unable to get utilities (heat, electricity) when it was really needed?: No   Food Insecurity: Low Risk  (9/21/2024)    Food Insecurity     Within  the past 12 months, did you worry that your food would run out before you got money to buy more?: No     Within the past 12 months, did the food you bought just not last and you didn t have money to get more?: No   Transportation Needs: High Risk (9/21/2024)    Transportation Needs     Within the past 12 months, has lack of transportation kept you from medical appointments, getting your medicines, non-medical meetings or appointments, work, or from getting things that you need?: Yes   Physical Activity: Not on file   Stress: Not on file   Social Connections: Not on file   Interpersonal Safety: Low Risk  (9/21/2024)    Interpersonal Safety     Do you feel physically and emotionally safe where you currently live?: Yes     Within the past 12 months, have you been hit, slapped, kicked or otherwise physically hurt by someone?: No     Within the past 12 months, have you been humiliated or emotionally abused in other ways by your partner or ex-partner?: No   Housing Stability: Low Risk  (9/21/2024)    Housing Stability     Do you have housing? : Yes     Are you worried about losing your housing?: No

## 2024-09-25 NOTE — PLAN OF CARE
/77 (BP Location: Left arm)   Pulse 80   Temp 97.6  F (36.4  C) (Oral)   Resp 16   Wt 62.4 kg (137 lb 8 oz)   SpO2 100%   BMI 24.36 kg/m      2300 - 0730    Goal Outcome Evaluation:      Plan of Care Reviewed With: patient    Overall Patient Progress: no change    A/o X4; vss on RA. Abd pain rated 8/10 mgd w/ PRN oxy x1 and PRN tylenol x1.Denies n/v, SOB,CP or N/T. BLE edema +3. Lymphedema wraps off from prev shift; legs elevated. Port HL.Suprapubic cath w/ AUOP. LBM 09/22. A2 w/ GB and walker. Continue to follow POC

## 2024-09-25 NOTE — PROGRESS NOTES
"Care Management Follow Up    Length of Stay (days): 4    Expected Discharge Date: 09/30/2024     Concerns to be Addressed: discharge planning     Patient plan of care discussed at interdisciplinary rounds: Yes    Anticipated Discharge Disposition: Home, Home Care Vs Transitional Care     Anticipated Discharge Services: PCA, County Worker  Anticipated Discharge DME: Wheelchair    Patient/family educated on Medicare website which has current facility and service quality ratings: no  Education Provided on the Discharge Plan: Yes  Patient/Family in Agreement with the Plan: Daughter Joy in agreement with plan for potential TCU stay, patient is not.     Referrals Placed by CM/SW: Internal Clinic Care Coordination, Homecare, Durable Medical Equipment (DME)  Private pay costs discussed: Not applicable    Discussed  Partnership in Safe Discharge Planning  document with patient/family: No     Handoff Completed:     Additional Information:  h/o 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 Currently on IV AB. Per notes, plan for monthly OP suprapubic tube exchange.     Per Dr. Cha in consultation with ID: patient will need 14 days of IV ABX from 9/23 as day 1. IZABELLA as early as 9/26.     Per [facility], \"Pt has IV abx coverage through their [facility] plan, coverage should be at 100%, however there may be a copay upon dispense... Joy [daughter] and her cousin are willing to learn and manage home IV therapy. \"  Patient has port.     RNCC met with patient at the bedside, reviewed role of CCRN and reviewed MD plan for IV antibiotics at time of discharge. Patient was not aware of plan to discharge tomorrow stated, \"I think I need a few more days here, I could barely walk to the door.\" RNCC reviewed PT/OT reccs for short TCU stay, patient verbalized bad experience with rehab in " the past, is only interested in going home. Patient stated she has support from family at home and at her daughter's home, but wasn't able to confirm details. Patient okay with RNCC calling daughter/niece to obtain further details.     RNCC called patient's daughter Joy over the phone. Reviewed reccs from PT/OT for TCU. Joy reinforced that patient is resistant to TCU d/t past bad experiences, but will call her mom this afternoon to encourage TCU acceptance if it's what is recommended by medical staff.    Per Joy, plan would be to discharge to patient's own home, where her two adult grandchildren live with her:   Aranza's address:   [address], [place]    Joy also confirmed that wheelchair can be delivered to her home, which is address listed on facesheet:   Joy's address:  [address] [address]     MD Cha updated on these discussions, will call Joy to provide a medical update on her mom.     Next Steps:   Follow up on PT/OT recc after further assessments  Send TCU referrals pending therapy reccs if patient is agreeable   Update [facility] on discharge plan  Update [facility] on discharge plan to obtain wheelchair     LUÍS Quigley  Care Management Department  Phone: [phone]  5A room [id number]  5C (non-BMT) room 9360-0137

## 2024-09-26 ENCOUNTER — APPOINTMENT (OUTPATIENT)
Dept: GENERAL RADIOLOGY | Facility: CLINIC | Age: 69
End: 2024-09-26
Attending: STUDENT IN AN ORGANIZED HEALTH CARE EDUCATION/TRAINING PROGRAM
Payer: COMMERCIAL

## 2024-09-26 ENCOUNTER — APPOINTMENT (OUTPATIENT)
Dept: PHYSICAL THERAPY | Facility: CLINIC | Age: 69
End: 2024-09-26
Payer: COMMERCIAL

## 2024-09-26 ENCOUNTER — APPOINTMENT (OUTPATIENT)
Dept: OCCUPATIONAL THERAPY | Facility: CLINIC | Age: 69
End: 2024-09-26
Payer: COMMERCIAL

## 2024-09-26 LAB
ANION GAP SERPL CALCULATED.3IONS-SCNC: 7 MMOL/L (ref 7–15)
BUN SERPL-MCNC: 16.9 MG/DL (ref 8–23)
CALCIUM SERPL-MCNC: 8 MG/DL (ref 8.8–10.4)
CHLORIDE SERPL-SCNC: 104 MMOL/L (ref 98–107)
CREAT SERPL-MCNC: 0.91 MG/DL (ref 0.51–0.95)
EGFRCR SERPLBLD CKD-EPI 2021: 67 ML/MIN/1.73M2
ERYTHROCYTE [DISTWIDTH] IN BLOOD BY AUTOMATED COUNT: 21.6 % (ref 10–15)
GLUCOSE SERPL-MCNC: 91 MG/DL (ref 70–99)
HCO3 SERPL-SCNC: 24 MMOL/L (ref 22–29)
HCT VFR BLD AUTO: 23.9 % (ref 35–47)
HGB BLD-MCNC: 7.5 G/DL (ref 11.7–15.7)
MAGNESIUM SERPL-MCNC: 1.9 MG/DL (ref 1.7–2.3)
MCH RBC QN AUTO: 27.6 PG (ref 26.5–33)
MCHC RBC AUTO-ENTMCNC: 31.4 G/DL (ref 31.5–36.5)
MCV RBC AUTO: 88 FL (ref 78–100)
PLATELET # BLD AUTO: 66 10E3/UL (ref 150–450)
POTASSIUM SERPL-SCNC: 3.9 MMOL/L (ref 3.4–5.3)
RBC # BLD AUTO: 2.72 10E6/UL (ref 3.8–5.2)
SODIUM SERPL-SCNC: 135 MMOL/L (ref 135–145)
WBC # BLD AUTO: 6.7 10E3/UL (ref 4–11)

## 2024-09-26 PROCEDURE — 83735 ASSAY OF MAGNESIUM: CPT | Performed by: STUDENT IN AN ORGANIZED HEALTH CARE EDUCATION/TRAINING PROGRAM

## 2024-09-26 PROCEDURE — 250N000013 HC RX MED GY IP 250 OP 250 PS 637

## 2024-09-26 PROCEDURE — 80048 BASIC METABOLIC PNL TOTAL CA: CPT | Performed by: STUDENT IN AN ORGANIZED HEALTH CARE EDUCATION/TRAINING PROGRAM

## 2024-09-26 PROCEDURE — 97530 THERAPEUTIC ACTIVITIES: CPT | Mod: GP | Performed by: PHYSICAL THERAPIST

## 2024-09-26 PROCEDURE — 97140 MANUAL THERAPY 1/> REGIONS: CPT | Mod: GO

## 2024-09-26 PROCEDURE — 85014 HEMATOCRIT: CPT | Performed by: STUDENT IN AN ORGANIZED HEALTH CARE EDUCATION/TRAINING PROGRAM

## 2024-09-26 PROCEDURE — 250N000013 HC RX MED GY IP 250 OP 250 PS 637: Performed by: STUDENT IN AN ORGANIZED HEALTH CARE EDUCATION/TRAINING PROGRAM

## 2024-09-26 PROCEDURE — 99232 SBSQ HOSP IP/OBS MODERATE 35: CPT | Mod: 24 | Performed by: INTERNAL MEDICINE

## 2024-09-26 PROCEDURE — 120N000002 HC R&B MED SURG/OB UMMC

## 2024-09-26 PROCEDURE — 72070 X-RAY EXAM THORAC SPINE 2VWS: CPT

## 2024-09-26 PROCEDURE — 99233 SBSQ HOSP IP/OBS HIGH 50: CPT | Performed by: STUDENT IN AN ORGANIZED HEALTH CARE EDUCATION/TRAINING PROGRAM

## 2024-09-26 PROCEDURE — 250N000011 HC RX IP 250 OP 636: Performed by: STUDENT IN AN ORGANIZED HEALTH CARE EDUCATION/TRAINING PROGRAM

## 2024-09-26 PROCEDURE — 72070 X-RAY EXAM THORAC SPINE 2VWS: CPT | Mod: 26 | Performed by: STUDENT IN AN ORGANIZED HEALTH CARE EDUCATION/TRAINING PROGRAM

## 2024-09-26 RX ORDER — ATENOLOL 25 MG/1
12.5 TABLET ORAL DAILY
Status: DISCONTINUED | OUTPATIENT
Start: 2024-09-27 | End: 2024-09-27 | Stop reason: HOSPADM

## 2024-09-26 RX ORDER — METHOCARBAMOL 500 MG/1
500 TABLET, FILM COATED ORAL
Status: COMPLETED | OUTPATIENT
Start: 2024-09-26 | End: 2024-09-26

## 2024-09-26 RX ADMIN — APIXABAN 2.5 MG: 2.5 TABLET, FILM COATED ORAL at 08:49

## 2024-09-26 RX ADMIN — OXYCODONE HYDROCHLORIDE 15 MG: 5 TABLET ORAL at 18:27

## 2024-09-26 RX ADMIN — APIXABAN 2.5 MG: 2.5 TABLET, FILM COATED ORAL at 19:40

## 2024-09-26 RX ADMIN — Medication 3 MG: at 19:40

## 2024-09-26 RX ADMIN — ERTAPENEM SODIUM 1 G: 1 INJECTION, POWDER, LYOPHILIZED, FOR SOLUTION INTRAMUSCULAR; INTRAVENOUS at 08:42

## 2024-09-26 RX ADMIN — OXYCODONE HYDROCHLORIDE 15 MG: 5 TABLET ORAL at 05:27

## 2024-09-26 RX ADMIN — DICLOFENAC SODIUM 2 G: 10 GEL TOPICAL at 05:27

## 2024-09-26 RX ADMIN — OXYCODONE HYDROCHLORIDE 15 MG: 5 TABLET ORAL at 12:24

## 2024-09-26 RX ADMIN — ACETAMINOPHEN 650 MG: 325 TABLET ORAL at 01:07

## 2024-09-26 RX ADMIN — ACETAMINOPHEN 650 MG: 325 TABLET ORAL at 18:01

## 2024-09-26 RX ADMIN — HEPARIN, PORCINE (PF) 10 UNIT/ML INTRAVENOUS SYRINGE 5 ML: at 18:02

## 2024-09-26 ASSESSMENT — ACTIVITIES OF DAILY LIVING (ADL)
ADLS_ACUITY_SCORE: 37

## 2024-09-26 NOTE — PROVIDER NOTIFICATION
Provider Jeanette Cha notified via [email or web address] 0422.     Pt still having 8/10 pain after oxy, thoughts on ordering muscle relaxer?     Brett CRAMER     Plan: One time robaxin ordered.

## 2024-09-26 NOTE — PROGRESS NOTES
Care Management Follow Up    Length of Stay (days): 5    Expected Discharge Date: MED READY 9/26/2025      Concerns to be Addressed: discharge planning     Patient plan of care discussed at interdisciplinary rounds: Yes    Anticipated Discharge Disposition: Acute Rehab v home with HC/     Anticipated Discharge Services: PCA, County Worker  Anticipated Discharge DME: Wheelchair    Patient/family educated on Medicare website which has current facility and service quality ratings: yes  Education Provided on the Discharge Plan: Yes  Patient/Family in Agreement with the Plan: yes    Referrals Placed by CM/SW: Internal Clinic Care Coordination, Homecare, Durable Medical Equipment (DME)  Private pay costs discussed: Not applicable    Discussed  Partnership in Safe Discharge Planning  document with patient/family: Yes:      Handoff Completed: No, handoff not indicated or clinically appropriate    Additional Information:  h/o 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 Currently on IV AB. Per notes, plan for monthly OP suprapubic tube exchange.     Per MD Cha: Patient med ready to discharge today. Needs 14 days IV antibiotics with start date of 9/23. Has oncology f/up with labs 10/3 (rescheduled from 9/25)     Per this writer's note 9/25: RNCC reviewed PT/OT reccs for short TCU stay, patient verbalized bad experience with rehab in the past, is only interested in going home. Patient stated she has support from family at home and at her daughter's home, but wasn't able to confirm details. Patient okay with RNCC calling daughter/niece to obtain further details.     UPDATE 9/26:  RNCC called and followed up with Joy (patient's daughter [phone]). After having conversation with MD Cha 9/25 and talking with her mom, Joy stated she and her mom are comfortable  "with proceeding with finding TCU.     RNCC reviewed medicare.gov, asked Joy to review facilities in desired location within the next hour. Writer explained patient's med ready status, and goal to obtain TCU that is covered and can provide cares needed as soon as possible.     Update 1135:  Referrals sent per Joy's preferences:   [facility]  [address] [place] [address]  Ph: [phone]  F: [phone]    2. [facility]  [address] [place] [address]  Ph: [phone]  F: [phone]    3. [facility]  [address] [place] [address]  [id number]  Admissions: [phone]  F: [phone]    Update 1500:   RNCC left voicemail for Joy to update that referrals were sent to TCU, no accepting facilities yet. Told Joy to anticipate [facility] doing initial teach at bedside tomorrow.     Plan B: (discharge to home)    Per [facility], \"[facility], coverage should be at 100%, however there may be a copay upon dispense... Joy [daughter] and her cousin are willing to learn and manage home IV therapy. \"  Patient has port. Updated [facility] liaison to keep referral open, pending TCU acceptance/patient agreeability.     Preexisting community resources:   *PCA (7 hr/day) - resume  *County Worker - resume  *Home Care - resume RN/PT- ordered, accepted  [facility]  P: [phone]  F: [phone]    DME: Patient requested Wheelchair if discharging home - referral/order sent to [facility]     Next Steps:   Follow up on TCU referrals  RNCC follow up with [facility] (Melisa [phone] x [phone]) to cancel or confirm wheelchair delivery   IMM needed at discharge  Follow up with [facility] Liaison    LUÍS Quigley  Care Management Department  Phone: [phone]  5A room 6045-9849  5C (non-BMT) room 9278-3243       "

## 2024-09-26 NOTE — PLAN OF CARE
"1930-0730    Dx: LE edema, UTI, hyponatremia     Pt is A&O x4 at this time, but may be forgetful per previous nurse. Pt is void via suprapubic catheter. Pt reported last BM was 9/230-PRN Miralax given. Pt is assist x1 with walker. Pt reported pain 9/10 in shoulder and abdomen-pn oxy 15 mg given with improvement. Suprapubic site, Right elbow/thigh/calf, and Buttock dressings are due to be changed 9/26 per WOC note. Port is C/D/I- cultures drawn.     Vital signs:  Temp: 97.5  F (36.4  C) Temp src: Oral BP: 101/58 Pulse: 68   Resp: 16 SpO2: 98 % O2 Device: None (Room air)     Weight: 62.4 kg (137 lb 8 oz)  Estimated body mass index is 24.36 kg/m  as calculated from the following:    Height as of 9/4/24: 1.6 m (5' 3\").    Weight as of this encounter: 62.4 kg (137 lb 8 oz).    Goal Outcome Evaluation:      Plan of Care Reviewed With: patient    Overall Patient Progress: no changeOverall Patient Progress: no change    Outcome Evaluation: Pt report shoulder/ abdominal pain 9/10 with PRN oxy PO improvement. Blood cultures collected.      "

## 2024-09-26 NOTE — PROGRESS NOTES
General Infectious Disease Service  [place] Team  Patient:  Alis Hartman  Date of birth 1955  Medical record number [id number]  Date of Admission: 9/20/2024  Date of Visit:  9/26/2024  Requesting Provider: Jeanette Cha         Assessment and Recommendations     Problem List:  ESBL Klebsiella Bacteremia (9/21 positive blood culture).   Hx of Klebsiella ESBL from abdominal wound in July 2024  Hx of Klebsiella UTI/colonization with chronic suprepubic catheter (last changed 9/23)  Cervical cancer undergoing radiation treatment. Chemotherapy cycles approximately every 21 days. Last one was 9/8 next one is 10/11.   carboplatin/paclitaxel   Hx of gastric bypass  MADHU on CKD3 improving  Acute anemia and thrombocytopenia - likely due to bone marrow suppression from chemotherapy.   Sacral wound    Discussion:  Aranza is a 69 year old female with a past medical history of endocervical cancer treated with radiation, hx of bladder resection w/ suprapubic catheter, hx of gastric bypass, afib, HTN, and schizophrenia who was ultimately diagnosed with Klebsiella pneumonia ESBL bacteremia from a blood culture on 9/21. She had another blood culture drawn on 9/24 which is negative thus far. It is likely her bacteremia seeded from the urine with the high polymicrobial burden cultured from the urine.      Since then she has been treated with IV meropenem (9/21-9/23) and IV ertapenem (9/23-present). Since starting treatment Aranza has improved clinically and the team is discussing discharge for her soon. The ertapenem will cover the ESBL Klebsiella. The question to the ID team was how long to treat this patient for. Given her infection is not complicated and she has no physical exam or ROS findings of disseminated infection we will treat for the next 14 days (9/22-10/3). This is a slightly longer course than the typically recommended 7-10 days for an uncomplicated bacteremia due to her multiple wound sites. Her sacral wound,  suprapubic catheter site wound and the small wound on her elbow are all mostly healed and likely did not contribute to this infection. Further, we checked a CVP blood culture on 9/25 to insure the port is not being seeded. So far it is negative after 12 hours. We will need to replace the port if it does grow anything. If it stays negative she can do surveillance blood cultures. Patient has new back pain. We recommend doing an X-Ray for this as it's possible that the infection could seed in the spine. Although this would be unlikely as she is clinically improved.     Plan:  Treat with 14 days of IV Ertapenem. Day 1 being 9/22. Full course (9/22-10/3)   Follow up outpatient with infectious disease   Either place a PICC line or use current port site for treatment. Per oncology's preference.   Follow up 9/25 blood culture from port.   Consider spinal imaging (either Xray or MRI)    Recommendations discussed with supervising attending physician, Dr. Sanchez.    Thank you for this consult. The General ID team will continue to follow this patient. Please feel free to call with any questions.     BATOOL Fisher   [facility]   Infectious Disease Service    Attestation:  I saw and evaluated this patient.  I discussed this patient with  and agree with BATOOL Fisher the findings and plan in this note. I have reviewed today's vital signs, medications, labs and imaging.    Key findings:  Klebsiella pneumoniae ESBL bacteremia   feeling better, afebrile but complains of new mid back pain. consider imaging, can start with Xray but if pain persists, recommend MRI ( if no contraindication)  to evaluate for spinal infection,  continue wound care per WOC     continue ertapenem x 14 days     Discussed recs with primary team     Total time on day of visit including chart review, counseling, documentation, and  coordination of care: 40 min      Mathew Sanchez MD,M.Med.Sc.  Staff,  Infectious Diseases  Pager: [phone]                   Antibiotics & Cultures, Consolidated     Cultures:   9/21 Bcx - positive for ESBL Klebsiella   9/21 Urine culture - growing >100k mix of urogenital elisa   9/24 Bcx - no growth after 12 hours.      Antibiotics:   9/21 Ceftriaxone 1g   9/21- 9/23 Meropenem IV   9/24-9/25 Ertapenem internal jugular          Interval History     Aranza is doing well today. She is still feeling weaker than her baseline. She has stable abdominal pain. She has been working with PT to walk more. She does report some new back pain.          Physical Exam     /59 (BP Location: Right arm)   Pulse 53   Temp 97.5  F (36.4  C) (Oral)   Resp 18   Wt 62.4 kg (137 lb 8 oz)   SpO2 100%   BMI 24.36 kg/m       Exam:  GENERAL:  Well-developed, well-nourished, not in acute distress.   HEAD: Normocephalic and atraumatic  EYES:  Eyes grossly normal to inspection, conjunctivae and sclerae normal   NECK:  no scars or swelling.   LUNGS:  Clear to auscultation - no rales, rhonchi or wheezes.   CARDIOVASCULAR:  Regular rate and rhythm, normal S1 S2, no S3 or S4, no murmur, click or rub, no peripheral edema and peripheral pulses strong  ABDOMEN:  Soft, tender with palpation around suprapubic catheter site not erythematous, no exudate, wound appears healed, no hepatosplenomegaly, no masses and bowel sounds normal  EXT: Extremities warm and with significant edema. OT in room wrapping legs while I was there.   MS: No gross musculoskeletal defects noted. Back pain positive.   SKIN:  No acute rashes or suspicious lesions. Sacral wound, and small wound on elbow. Both appear to be healing well via the media tab. WOC will visit for sacral wound today.   NEUROLOGIC:  Grossly nonfocal. Normal strength and tone, mentation intact and speech normal  PSYCHIATRIC: Mood stable, mentation appears normal, affect normal

## 2024-09-26 NOTE — PLAN OF CARE
Goal Outcome Evaluation:      Plan of Care Reviewed With: patient    Overall Patient Progress: improvingOverall Patient Progress: improving    Outcome Evaluation: Calm and cooperative. Able to make needs known. Uses call light appropriately. Wound cares completed. Pt reported generalized pain and requested PRN oxy PO, verbilized adequate pain controll. Educated about importance of repositioning. Pt is due for PT. Continue with ordered ABX.

## 2024-09-26 NOTE — PROGRESS NOTES
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    ESBL K pneumoniae bacteremia  Catheter-associated UTI  Hx of Klebsiella pna ESBL UTI  Mild right hydroureteronephrosis   Recent suprapubic cath placement - 8/2024- s/p exchange 9/22  S/p partial cystotomy with complex bladder closure and omental flap- 7/2024  - Blood cx growing ESBL Klebsiella pneumoniae on 9/21, repeat Bcx 9/24 to make sure she has cleared her bacteremia, repeated 9/25 from her port  - UCx >100k colonies mixed elisa  - On IV meropenem, deescalated to IV ertapenem on 9/23  - Consulted ID to determine final course of abx given bacteremia rec 14 day treatment from 9/22 - 10/5  - will need OP ID f/up  - gyn/onc OK with using port for abx  - Appreciate urology for their help with SPT exchange    B/l LE edema, improving  Anasarca  Hypoalbuminemia  MADHU on CKD stage II (baseline ~ 0.9)- likely ATN from bacteremia, resolved  Hypotonic hyponatremia- asymptomatic, resolved  Hypokalemia, resolved  Hypomagnesemia, resolved  - Nephro consulted, appreciate their recs  - Strict I/O  - TTE with EF wnl, no other abnormality  - 24 hr urine protein elevated in 400s, HIV negative, C3-C4 wnl, hepatitis panel, lipid panel wnl, A1c low, BÁRBARA neg,   - Serum free light chain with small monoclonal gamma protein, serum IF with possible monoclonal IgM immunoglobulin with no correlated light chain type, follow up on  urine IF  - TSH wnl   - Lymphedema service consulted, pressure stocking, leg elevation  - on RN replacement protocol for hypoK and hypoMag    Chronic microcytic/normocytic anemia  Thrombocytopenia  Plt wnl 2 weeks ago and now low at 40-50K, she has been started on her Taxol/Carbo infusions and this could be BM suppression from chemo, will monitor, no e/o bleeding so far  - checked ferritin, iron studies    # Chronic back pain- on home oxycodone per OP pain specialist- resumed PTA oxycodone 15 q6hr PRN    # Abdominal pain  Reports lower abdominal pain since her gallbladder was removed a month and a half ago but there are no records of this, however she did have XL, SNEHA, BSO, EDIE and cystotomy repair with suprapubic bladder insertion and omental flap on 7/12/24 which may be what she is referring to. On chart review she has abdominal pain going back several months.   - continue PTA pain management for her chronic pain  - consider further investigation if any changes  - zofran PRN for nausea    # Atrial fibrillation - on home Eliquis, Hb trending down last 48hr, held on resuming AC, but no concern for blood loss.  - resumed PTA atenolol 25 mg daily on 9/25, decrease dose to 12.5 mg on 9/27 given soft BPs  - resume apixaban at lower dose 2.5 mg BID (due to renal function and thrombocytopenia)    # insomnia  - melatonin at bedtime  - space v/s to q8H, allow interrupted sleep  - note PTA on Ambien PRN    Serous uterine carcinoma s/p SNEHA/BSO  History of cervical cancer, s/p radiation therapy  - following with gyn/onc  - has oncology f/up with labs 10/3 (rescheduled from 9/25), discussed with her OP team and the IP team, would not do chemo while on abx but plan for after.       Chronic conditions:  # History of REMA-III, s/p laser therapy  # History of gastric bypass   # Hypertension   # Osteoarthritis  #Paranoid schizophrenia- pt not taking meds  #RAFAEL- pt not taking meds    Dispo: PT/OT - OT home with assist vs home with  home OT vs TCU, PT home with assist vs home with home PT vs TCU. Pt declines TCU. Has home cares, PCA services, we are working on getting her a wheelchair.   Family wants pt to go to TCU. Continuing to discuss and work with therapies.         Diet: Renal Diet (non-dialysis)    DVT Prophylaxis: DOAC  Shahid Catheter: Not present  Lines: PRESENT      Port a Cath 08/26/24 Single Lumen Right Chest wall-Site Assessment: WDL      Cardiac Monitoring: None  Code Status: Full Code      Clinically Significant Risk Factors         # Hyponatremia: Lowest Na = 133 mmol/L in last 2 days, will monitor as appropriate      # Hypoalbuminemia: Lowest albumin = 2.2 g/dL at 9/24/2024  3:14 AM, will monitor as appropriate   # Thrombocytopenia: Lowest platelets = 53 in last 2 days, will monitor for bleeding   # Hypertension: Noted on problem list               # Financial/Environmental Concerns: none         Disposition Plan     Medically Ready for Discharge: Ready Now  Medically ready but awaiting home vs TCU discussions.         Jeanette Cha MD (Sally)  Internal Medicine/Pediatrics  Hospitalist    Hospitalist Service, [address]  Securely message with [employer] (more info)  Text page via [employer] Paging/Directory   See signed in provider for up to date coverage information  ______________________________________________________________________    Interval History   No acute events overnight. Told she was not getting chemo today and she was very upset by this. Also upset by daughter telling her she wants her to go to TCU. Refused to work with therapy this morning due to this. Chronic abd pain continues.     Physical Exam   Vital Signs: Temp: 97.5  F (36.4  C) Temp src: Oral BP: 104/59 Pulse: 53   Resp: 18 SpO2: 100 % O2 Device: None (Room air)    Weight: 137 lbs 8 oz    General: awake, alert, in no acute distress  HEENT: NCAT, sclera anicteric, no nasal discharge, MMM  CV: RRR, no  murmurs noted  Resp: CTAB, no increased WOB  Abd: Soft, mildly tender to palpation in lower abdomen, nondistended, +BS, no rebound or guarding  MSK: pitting peripheral edema could not fully examine due to lymphedema wraps, extremities warm and well perfused  Skin: warm, dry, no jaundice  Neuro:  Alert and oriented x4.    Medical Decision Making       50 MINUTES SPENT BY ME on the date of service doing chart review, history, exam, documentation & further activities per the note.      Data   ------------------------- PAST 24 HR DATA REVIEWED -----------------------------------------------    I have personally reviewed the following data over the past 24 hrs:    6.7  \   7.5 (L)   / 66 (L)     135 104 16.9 /  91   3.9 24 0.91 \       Imaging results reviewed over the past 24 hrs:   No results found for this or any previous visit (from the past 24 hour(s)).

## 2024-09-27 ENCOUNTER — APPOINTMENT (OUTPATIENT)
Dept: OCCUPATIONAL THERAPY | Facility: CLINIC | Age: 69
End: 2024-09-27
Payer: COMMERCIAL

## 2024-09-27 ENCOUNTER — APPOINTMENT (OUTPATIENT)
Dept: PHYSICAL THERAPY | Facility: CLINIC | Age: 69
End: 2024-09-27
Payer: COMMERCIAL

## 2024-09-27 VITALS
BODY MASS INDEX: 22.96 KG/M2 | WEIGHT: 129.6 LBS | SYSTOLIC BLOOD PRESSURE: 121 MMHG | DIASTOLIC BLOOD PRESSURE: 77 MMHG | HEART RATE: 74 BPM | OXYGEN SATURATION: 100 % | TEMPERATURE: 97.4 F | RESPIRATION RATE: 17 BRPM

## 2024-09-27 LAB
ANION GAP SERPL CALCULATED.3IONS-SCNC: 9 MMOL/L (ref 7–15)
BUN SERPL-MCNC: 18.7 MG/DL (ref 8–23)
BURR CELLS BLD QL SMEAR: SLIGHT
CALCIUM SERPL-MCNC: 8.3 MG/DL (ref 8.8–10.4)
CHLORIDE SERPL-SCNC: 103 MMOL/L (ref 98–107)
CREAT SERPL-MCNC: 0.99 MG/DL (ref 0.51–0.95)
CRP SERPL-MCNC: 40.7 MG/L
EGFRCR SERPLBLD CKD-EPI 2021: 61 ML/MIN/1.73M2
ERYTHROCYTE [DISTWIDTH] IN BLOOD BY AUTOMATED COUNT: 22.2 % (ref 10–15)
GLUCOSE SERPL-MCNC: 88 MG/DL (ref 70–99)
HCO3 SERPL-SCNC: 22 MMOL/L (ref 22–29)
HCT VFR BLD AUTO: 24.4 % (ref 35–47)
HGB BLD-MCNC: 7.8 G/DL (ref 11.7–15.7)
LOCATION OF TASK: NORMAL
MAGNESIUM SERPL-MCNC: 1.9 MG/DL (ref 1.7–2.3)
MCH RBC QN AUTO: 28.4 PG (ref 26.5–33)
MCHC RBC AUTO-ENTMCNC: 32 G/DL (ref 31.5–36.5)
MCV RBC AUTO: 89 FL (ref 78–100)
PLAT MORPH BLD: ABNORMAL
PLATELET # BLD AUTO: 108 10E3/UL (ref 150–450)
POTASSIUM SERPL-SCNC: 3.9 MMOL/L (ref 3.4–5.3)
PROT ELPH PNL UR ELPH: NORMAL
RBC # BLD AUTO: 2.75 10E6/UL (ref 3.8–5.2)
RBC MORPH BLD: ABNORMAL
SODIUM SERPL-SCNC: 134 MMOL/L (ref 135–145)
TARGETS BLD QL SMEAR: SLIGHT
WBC # BLD AUTO: 7 10E3/UL (ref 4–11)

## 2024-09-27 PROCEDURE — 80048 BASIC METABOLIC PNL TOTAL CA: CPT | Performed by: STUDENT IN AN ORGANIZED HEALTH CARE EDUCATION/TRAINING PROGRAM

## 2024-09-27 PROCEDURE — 99239 HOSP IP/OBS DSCHRG MGMT >30: CPT | Performed by: STUDENT IN AN ORGANIZED HEALTH CARE EDUCATION/TRAINING PROGRAM

## 2024-09-27 PROCEDURE — 86140 C-REACTIVE PROTEIN: CPT | Performed by: INTERNAL MEDICINE

## 2024-09-27 PROCEDURE — 250N000013 HC RX MED GY IP 250 OP 250 PS 637

## 2024-09-27 PROCEDURE — 97140 MANUAL THERAPY 1/> REGIONS: CPT | Mod: GO | Performed by: OCCUPATIONAL THERAPIST

## 2024-09-27 PROCEDURE — 97535 SELF CARE MNGMENT TRAINING: CPT | Mod: GO | Performed by: OCCUPATIONAL THERAPIST

## 2024-09-27 PROCEDURE — 250N000011 HC RX IP 250 OP 636: Performed by: STUDENT IN AN ORGANIZED HEALTH CARE EDUCATION/TRAINING PROGRAM

## 2024-09-27 PROCEDURE — 85027 COMPLETE CBC AUTOMATED: CPT | Performed by: STUDENT IN AN ORGANIZED HEALTH CARE EDUCATION/TRAINING PROGRAM

## 2024-09-27 PROCEDURE — 250N000013 HC RX MED GY IP 250 OP 250 PS 637: Performed by: STUDENT IN AN ORGANIZED HEALTH CARE EDUCATION/TRAINING PROGRAM

## 2024-09-27 PROCEDURE — 83735 ASSAY OF MAGNESIUM: CPT | Performed by: STUDENT IN AN ORGANIZED HEALTH CARE EDUCATION/TRAINING PROGRAM

## 2024-09-27 PROCEDURE — 97530 THERAPEUTIC ACTIVITIES: CPT | Mod: GP

## 2024-09-27 PROCEDURE — 97116 GAIT TRAINING THERAPY: CPT | Mod: GP

## 2024-09-27 PROCEDURE — 99232 SBSQ HOSP IP/OBS MODERATE 35: CPT | Mod: 24 | Performed by: INTERNAL MEDICINE

## 2024-09-27 RX ORDER — ERTAPENEM 1 G/1
1 INJECTION, POWDER, LYOPHILIZED, FOR SOLUTION INTRAMUSCULAR; INTRAVENOUS EVERY 24 HOURS
Status: ACTIVE | DISCHARGE
Start: 2024-09-28 | End: 2024-10-05

## 2024-09-27 RX ORDER — ATENOLOL 25 MG/1
12.5 TABLET ORAL DAILY
Qty: 30 TABLET | Refills: 3 | Status: SHIPPED | OUTPATIENT
Start: 2024-09-27

## 2024-09-27 RX ADMIN — HEPARIN, PORCINE (PF) 10 UNIT/ML INTRAVENOUS SYRINGE 5 ML: at 18:18

## 2024-09-27 RX ADMIN — ERTAPENEM SODIUM 1 G: 1 INJECTION, POWDER, LYOPHILIZED, FOR SOLUTION INTRAMUSCULAR; INTRAVENOUS at 08:42

## 2024-09-27 RX ADMIN — OXYCODONE HYDROCHLORIDE 15 MG: 5 TABLET ORAL at 08:41

## 2024-09-27 RX ADMIN — Medication 1 TABLET: at 05:17

## 2024-09-27 RX ADMIN — APIXABAN 2.5 MG: 2.5 TABLET, FILM COATED ORAL at 08:41

## 2024-09-27 RX ADMIN — HEPARIN, PORCINE (PF) 10 UNIT/ML INTRAVENOUS SYRINGE 5 ML: at 05:26

## 2024-09-27 RX ADMIN — Medication 12.5 MG: at 08:42

## 2024-09-27 RX ADMIN — CALCIUM CARBONATE (ANTACID) CHEW TAB 500 MG 1000 MG: 500 CHEW TAB at 09:00

## 2024-09-27 RX ADMIN — ACETAMINOPHEN 650 MG: 325 TABLET ORAL at 18:23

## 2024-09-27 RX ADMIN — OXYCODONE HYDROCHLORIDE 15 MG: 5 TABLET ORAL at 00:34

## 2024-09-27 RX ADMIN — OXYCODONE HYDROCHLORIDE 15 MG: 5 TABLET ORAL at 18:24

## 2024-09-27 ASSESSMENT — ACTIVITIES OF DAILY LIVING (ADL)
ADLS_ACUITY_SCORE: 38
ADLS_ACUITY_SCORE: 37
ADLS_ACUITY_SCORE: 37
ADLS_ACUITY_SCORE: 38
ADLS_ACUITY_SCORE: 38
ADLS_ACUITY_SCORE: 37
ADLS_ACUITY_SCORE: 38
ADLS_ACUITY_SCORE: 37
ADLS_ACUITY_SCORE: 37
ADLS_ACUITY_SCORE: 38
ADLS_ACUITY_SCORE: 38
ADLS_ACUITY_SCORE: 37
ADLS_ACUITY_SCORE: 38
ADLS_ACUITY_SCORE: 37

## 2024-09-27 NOTE — PROGRESS NOTES
Care Management Discharge Note    Discharge Date: 09/27/2024    Discharge Disposition: Home with assist    Discharge Services: PCA, County Worker, Home infusion    Discharge DME: Wheelchair, hospital bed    Discharge Transportation: family (Daughter Joy)    Private pay costs discussed: Not applicable    Does the patient's insurance plan have a 3 day qualifying hospital stay waiver?  No    PAS Confirmation Code: NA  Patient/family educated on Medicare website which has current facility and service quality ratings: yes    Education Provided on the Discharge Plan: Yes  Persons Notified of Discharge Plans: Daughter Joy, Bedside RN, [facility],   Patient/Family in Agreement with the Plan: yes    Handoff Referral Completed: yes - external [facility]    Additional Information:    Per MD, patient is med ready as of yesterday.     Per PT, patient is at baseline functioning status, discharge to home with assist of her family is appropriate. Patient and her daughter Joy ([phone]) in agreement with this plan to discharge to home with community services, family assistance, [facility] for IV anx, DME through [facility].     Per Joy, plan is to discharge to patient's own home, where her two adult grandchildren live with her.   Aranza's address:   [address] [place] [address] Home Infusion   Accepted for IV antibiotics   Lara Lozano, [facility] liaison kept updated on plan for discharge today  Lara came to bedside to teach Joy @ 12:30. Joy did not arrive on time due to coordinating DME delivery. Per Lara: She will continue to attempt bedside teach today, otherwise will coordinate a teach at home tomorrow.   Home infusion orders placed  Writer confirmed with Dr. Sanchez ID that she will follow outpatient  Bedside RN updated on the plan, will leave port-a-cath accessed at time of discharge per Lara     Preexisting community resources:   *PCA (7 hr/day) -  resume  *County Worker - resume  *Home Care - resume RN/PT- ordered, accepted **added OT services to order per MD Semaj  [employer]  P: [phone]  F: [phone]  RNCC left a VM to update agency on plan for discharge today       DME:   RNCC called [employer] (Melisa [phone] x [phone]) and confirmed plan to deliver wheelchair today   Joy previously confirmed that wheelchair can be delivered to her home, which is address listed on facesheet: (Joy's address: [address] [place] [address] [place] [address])  RNCC placed additional request for hospital bed. [facility] is coordinating delivery with Joy.     LUÍS Quigley  Care Management Department  Phone: [phone]  5A room [id number]  5C (non-BMT) room [phone]

## 2024-09-27 NOTE — PROGRESS NOTES
"Primary team:  Place order panel  [facility] ([facility]) Review and ID Care Transition   Place imaging order(s) requested above prior to discharge.  Contact ID teams about missed ID clinic appointments and/or ongoing therapy needs.    Prolonged Parenteral/Oral Antibiotic Recommendations and ID Follow up  This template provides final ID recommendations as of this date.     Infectious Diseases Indication: E.coli bacteremia ESBL , suprapubic cath, hydroureternephrosis, sacral wound      Antibiotic Information  Name of Antibiotic Dose of Antibiotic1 Anticipated duration Effective start date2 End date   Ertapenem 1 g IV daily 14 days 14 days 9/22 10/5/24                 1.Dose of antibiotic will need to be renally adjusted if creatinine clearance changes  2.Effective start date is the date of adequate therapy with appropriate spectrum    Method of antibiotic delivery:Port.Is the line being used for another indication besides antimicrobials? Yes At the end of therapy should the line used for antimicrobials be removed or de-accessed? No. port is for chemo. Selecting \"yes\" will function as written order to remove PICC line or de-access the indwelling line at the end of therapy.    Weekly labs required: CBC with diff and CMP. Dr. Sanchez will follow labs at discharge until ID follow up. Fax labs to ID clinic.    Are there pending microbial tests: yes    no clinic follow-up needed     ID provider route note: \"P [facility] Infectious Disease Adult [facility]\"    [facility] ID [facility] Information:  Phone: [phone]  Fax: [phone] (Attention ID clinic nurses)    "

## 2024-09-27 NOTE — PROGRESS NOTES
Care Management Follow Up    Length of Stay (days): 6    Expected Discharge Date: 09/27/2024     Concerns to be Addressed: discharge planning     Patient plan of care discussed at interdisciplinary rounds: Yes    Anticipated Discharge Disposition: Home, home care/home infusion     Anticipated Discharge Services: PCA, County Worker  Anticipated Discharge DME: Wheelchair/hospital bed    Patient/family educated on Medicare website which has current facility and service quality ratings: yes  Education Provided on the Discharge Plan: Yes  Patient/Family in Agreement with the Plan: yes    Referrals Placed by CM/SW: Internal Clinic Care Coordination, Homecare, Durable Medical Equipment (DME)  Private pay costs discussed: Not applicable -     Discussed  Partnership in Safe Discharge Planning  document with patient/family: No     Handoff Completed: No, handoff not indicated or clinically appropriate  Additional Information:  h/o 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 Currently on IV AB. Per notes, plan for monthly OP suprapubic tube exchange.      Per MD Cha: Patient med ready to discharge 9/26. Needs 14 days IV antibiotics with start date of 9/23. Has oncology f/up with labs 10/3 (rescheduled from 9/25)      Per this writer's note 9/25: RNCC reviewed PT/OT reccs for short TCU stay, patient verbalized bad experience with rehab in the past, is only interested in going home. Patient stated she has support from family at home and at her daughter's home, but wasn't able to confirm details. Patient okay with RNCC calling daughter/niece to obtain further details.      UPDATE 9/26:  RNCC called and followed up with Joy (patient's daughter [phone]). After having conversation with MD Cha 9/25 and talking with her mom, Joy stated she and her mom are  "comfortable with proceeding with finding TCU.      RNCC reviewed medicare.gov, asked Joy to review facilities in desired location within the next hour. Writer explained patient's med ready status, and goal to obtain TCU that is covered and can provide cares needed as soon as possible.     TCU Referrals sent per Joy's preferences:   [facility] - PENDING  [address] [place] [address]  Ph: [phone]  F: [phone]     2. [facility] - DECLINED  [address] [place] [address]  Ph: [phone]  F: [phone]     3. [facility] - PENDING  [address] [place]  [address]  Admissions: [phone]  F: [phone]    Update 1500:   RNCC left voicemail for Joy to update that referrals were sent to TCU, no accepting facilities yet. Told Joy to anticipate [facility] doing initial teach at bedside tomorrow.     UPDATE 9/27:  Per PT, patient is at baseline functioning status, discharge to home with assist of her family is appropriate. Writer briefly spoke with patient at the bedside, she is in agreement with plan to go home today.     Per MD, patient is med ready as of yesterday.     RNCC called and followed up with Joy (patient's daughter [phone]) She is in agreement with plan to discharge to home with community services, family assistance, [facility] for IV anx.     Per Joy, plan would be to discharge to patient's own home, where her two adult grandchildren live with her.   Aranza's address:   [address], [place]     Joy also confirmed that wheelchair can be delivered to her home, which is address listed on facesheet:             Joy's address:  [address] [address]      Per [facility], \"[facility]x [facility], coverage should be at 100%, however there may be a copay upon dispense... Joy [daughter] and her cousin are willing to learn and manage home IV " "therapy. \"  Patient has port. Updated [facility] liaison to keep referral open, pending TCU acceptance/patient agreeability.   Lara Lozano, [facility] liaison updated on plan to discharge home today  Will teach Joy at the bedside 9/27 @ 12:30  Home infusion orders placed  Writer confirmed with Dr. Sanchez, ID that she will follow outpatient    Preexisting community resources:   *PCA (7 hr/day) - resume  *County Worker - resume  *Home Care - resume RN/PT- ordered, accepted  [facility]  P: [phone]  F: [phone]  RNCC left a VM to update agency on plan for discharge today      DME: Patient requested Wheelchair if discharging home - referral/order sent to [employer].   RNCC called [employer] (Melisa [phone] x [phone]) and confirmed wheelchair delivery for today      Next Steps:   RNCC to submit request for hospital bed to be delivered to her home ([address] [place] [address], [place], [place])  IMM needed at discharge  Follow up with [facility] Liaison    LUÍS Quigley  Care Management Department  Phone: [phone]  5A room 8871-9425  5C (non-BMT) room [phone]         "

## 2024-09-27 NOTE — PLAN OF CARE
[id number]  Goal Outcome Evaluation:    Plan of Care Reviewed With: patient. Overall Patient Progress: no change. Outcome Evaluation: no change, feels like pain is not managed. Declined further interventions.    Vitals stable. Pain 8/10, oxy & tylenol given w/o relief. Provider notified and x1 robaxin ordered, pt declined to take. Repositioned as pt was willing to help get off bottom. Heat pack in place. Denies nausea/sob. Lymph wraps removed in evening due to discomfort per pt, felt too tight. Went down for Thoracic Xray this evening. Decreased urine output from suprapubic catheter, MD notified. Waiting for further response. Currently med ready, pt voiced hesitancy to going to TCU.

## 2024-09-27 NOTE — PHARMACY
[facility]  Parenteral ANtibiotic Review at Departure from Acute Care Collaborative Note     Patient: Alis Hartman  MRN: [id number]  Allergies: Ibuprofen, Shrimp, and Sulfa antibiotics    Current Location: [facility]  OPAT to be provided by: [facility]       Line Type: Port    Diagnosis/Indications: ESBL Klebsiella pneumoniae bacteremia likely seeded from the urine in the setting of a suprapubic catheter, hydroureternephrosis, sacral wound  Organism(s): ESBL Klebsiella pneumoniae  MRDO? Yes - non-carbapenem-resistant ESBL  Pending Cultures/Microbiological Tests: yes 9/24 and 9/25 blood culture finalization    Inpatient ID involved in developing OPAT plan: Yes - discharge OPAT plan has no changes from ID provider, Dr. Mathew Sanchez, OPAT plan charted on 9/27/2024    Outpatient ID Follow-up: ID OPAT Clinic Referral Placed ([facility] Ph: [phone] and Fax: [phone]) - no appointment needed  Designated Provider: Dr. Mathew Sanchez    Antimicrobial Regimen / Route Anticipated  Duration Start Date Stop /  Reassess Date   Ertapenem 1 g every 24 hours/IV 14 days 9/22/2024 10/5/2024     Laboratory Tests and Monitoring Frequency: CBC with Diff, CMP Once Weekly    Imaging/Miscellaneous Monitoring: None    ID Pharmacist Interventions: None                          Joann Hinton, PharmD, BCIDP  Pager: [phone]

## 2024-09-27 NOTE — PROGRESS NOTES
General Infectious Disease Service  [place] Team  Patient:  Alis Hartman  Date of birth 1955  Medical record number [id number]  Date of Admission: 9/20/2024  Date of Visit:  9/27/2024  Requesting Provider: Jeanette Cha         Assessment and Recommendations     Problem List:  ESBL Klebsiella Bacteremia (9/21 positive blood culture).   Hx of Klebsiella ESBL from abdominal wound in July 2024  Hx of Klebsiella UTI/colonization with chronic suprepubic catheter (last changed 9/23)  Blood cx from pot 9/25/24 - negative so far   Cervical cancer undergoing radiation treatment. Chemotherapy cycles approximately every 21 days. Last one was 9/8 next one is 10/11.   carboplatin/paclitaxel   Hx of gastric bypass  MADHU on CKD3 improving  Acute anemia and thrombocytopenia - likely due to bone marrow suppression from chemotherapy.   Sacral wound  Epigastric pain     Discussion:  Aranza is a 69 year old female with a past medical history of endocervical cancer treated with radiation, hx of bladder resection w/ suprapubic catheter, hx of gastric bypass, afib, HTN, and schizophrenia who was ultimately diagnosed with Klebsiella pneumonia ESBL bacteremia from a blood culture on 9/21. She had another blood culture drawn on 9/24 which is negative thus far. It is likely her bacteremia seeded from the urine with the high polymicrobial burden cultured from the urine.      Since then she has been treated with IV meropenem (9/21-9/23) and IV ertapenem (9/23-present). Since starting treatment Aranza has improved clinically and the team is discussing discharge for her soon. The ertapenem will cover the ESBL Klebsiella. The question to the ID team was how long to treat this patient for. Given her infection is not complicated and she has no physical exam or ROS findings of disseminated infection we will treat for the next 14 days (9/22-10/3). This is a slightly longer course than the typically recommended 7-10 days for an uncomplicated  bacteremia due to her multiple wound sites. Her sacral wound, suprapubic catheter site wound and the small wound on her elbow are all mostly healed and likely did not contribute to this infection. Further, we checked a CVP blood culture on 9/25 to insure the port is not being seeded. So far it is negative after 12 hours. We will need to replace the port if it does grow anything. If it stays negative she can do surveillance blood cultures. Patient has new back pain. We recommend doing an X-Ray for this as it's possible that the infection could seed in the spine. Although this would be unlikely as she is clinically improved.     Rec:  Treat with 14 days of IV Ertapenem. Day 1 being 9/22. Full course (9/22-10/5) via port   Follow up 9/25 blood culture from port.   no significant back pain today   weekly lab : CMP, CBC   follow-up with PCP     No follow-up with ID needed after discharge ID will sign off     Mathew Sanchez MD,M.Med.Sc.  Staff, Infectious Diseases  Pager: [phone]           Antibiotics & Cultures, Consolidated     Cultures:   9/21 Bcx - positive for ESBL Klebsiella   9/21 Urine culture - growing >100k mix of urogenital elisa   9/24 Bcx - no growth after 12 hours.   9/25 Blod cx from port - neg so far      Antibiotics:   9/21 Ceftriaxone 1g   9/21- 9/23 Meropenem IV   9/24- Ertapenem IV            Interval History     Aranza is doing well today. able to do the stairs with PT. denies back pain today. has stomach pain . tolerates ertapenem         Physical Exam     /82 (BP Location: Left arm)   Pulse 92   Temp 97.4  F (36.3  C) (Oral)   Resp 18   Wt 58.8 kg (129 lb 9.6 oz)   SpO2 100%   BMI 22.96 kg/m     Exam:  GENERAL:  alert, oriented, not in acute distress. pale  HEAD: Normocephalic and atraumatic  EYES:  Eyes grossly normal to inspection, conjunctivae and sclerae normal   NECK:  no scars or swelling.   LUNGS:  Clear to auscultation - no rales, rhonchi or wheezes.   CARDIOVASCULAR:   Regular rate and rhythm, normal S1 S2 no murmur, click or rub, lymphademe bilateral legs ( wrapped0   ABDOMEN:  Soft, tender with palpation around suprapubic catheter site and epigastric not erythematous, no exudate, wound appears healed, no hepatosplenomegaly, no masses and bowel sounds normal  EXT: Extremities warm and with significant edema. OT in room wrapping legs while I was there.   MS: No gross musculoskeletal defects noted. Back pain positive.   SKIN:  No acute rashes or suspicious lesions. Sacral wound, and small wound on elbow. Both appear to be healing well via the media tab. NEUROLOGIC:  Grossly nonfocal. Normal strength and tone, mentation intact and speech normal  PSYCHIATRIC: Mood stable, mentation appears normal, affect normal  Right chest - port

## 2024-09-27 NOTE — PLAN OF CARE
Goal Outcome Evaluation:    VSS. IV atb as ordered. SP catheter with adequate but marginal urine. Encouraged to drink more fluids. A&O x 4. Worked with PT. Home w/c and bed ordered by Care Coordinator. Waiting for patient's daughter to arrive to learn home antibiotic cares. Discharge orders in place. Leave port accessed.

## 2024-09-27 NOTE — PROVIDER NOTIFICATION
Provider Bessie Berrios notified via [email or web address] [id number].     FYI Suprapubic catheter output 125 ml over past 9 hrs. Pt states not drinking/ eating much today.     Brett CRAMER

## 2024-09-27 NOTE — PLAN OF CARE
Pt is alert and oriented x4, AVSS, afebrile on RA. Pt c/o of back pain t-pump in place and oxycodone given once. Pt declined prn tylenol. BLE edema managed  with lymph wrap. Shahid in place .Pt declined turned and reposition at night. Call light within reach.    Provider was paged r/t to pt having low urine output and stated that he is off campus for morning nurse to repage about it in the morning.

## 2024-09-28 NOTE — PROGRESS NOTES
Lymphedema Discharge Summary    Reason for therapy discharge:    Discharged to home with home therapy.    Progress towards therapy goal(s). See [facility] on Care [facility] health record for goal details.  Goals partially met.  Barriers to achieving goals:   discharge from facility.    Therapy recommendation(s):    Continued therapy is recommended.  Rationale/Recommendations:   Lymphedema services to promote IND with edema mgmt and promote fluid mobility.

## 2024-09-28 NOTE — PLAN OF CARE
Occupational Therapy Discharge Summary    Reason for therapy discharge:    Discharged to home with home therapy.    Progress towards therapy goal(s). See goals on Care Plan in [facility] electronic health record for goal details.  Goals partially met.  Barriers to achieving goals:   discharge from facility.    Therapy recommendation(s):    Continued therapy is recommended.  Rationale/Recommendations:  Per last OT who worked with pt, recommending short rehab stay at [facility] however if pt discharges home, recommend home with assist from family and HH therapies to progress IND/safety and return to PLOF.

## 2024-09-28 NOTE — DISCHARGE SUMMARY
[facility]  Hospitalist Discharge Summary      Date of Admission:  9/20/2024  Date of Discharge:  9/27/2024  6:32 PM  Discharging Provider: Jeanette Cha MD  Discharge Service: Hospitalist Service, GOLD TEAM 9    Discharge Diagnoses   ESBL K pneumoniae bacteremia  Catheter-associated UTI  Hx of Klebsiella pna ESBL UTI  Mild right hydroureteronephrosis   Recent suprapubic cath placement - 8/2024- s/p exchange 9/22  S/p partial cystotomy with complex bladder closure and omental flap- 7/2024  B/l LE edema, improving  Anasarca  Hypoalbuminemia  MADHU on CKD stage II (baseline ~ 0.9)- likely ATN from bacteremia, resolved  Hypotonic hyponatremia- asymptomatic, resolved  Hypokalemia, resolved  Hypomagnesemia, resolved  Chronic microcytic/normocytic anemia  Thrombocytopenia  Chronic back pain   Abdominal pain   R Elbow Pressure Injury vs Friction, POA  Atrial fibrillation   insomnia   Serous uterine carcinoma s/p SNEHA/BSO  History of cervical cancer, s/p radiation therapy  History of REMA-III, s/p laser therapy  History of gastric bypass   Hypertension   Osteoarthritis  Paranoid schizophrenia  RAFAEL    Clinically Significant Risk Factors          Follow-ups Needed After Discharge   Follow-up Appointments     Adult [facility] Follow-up and recommended labs and tests      Follow up with primary care provider, Maria Fernanda Eckert, within 7 days for   hospital follow- up.  No follow up labs or test are needed.      Follow up with your cancer team on 10/3.    Appointments on [place] and/or [facility] (with [facility] or [facility]   provider or service). Call [phone] if you haven't heard regarding   these appointments within 7 days of discharge.            Unresulted Labs Ordered in the Past 30 Days of this Admission       Date and Time Order Name Status Description    9/25/2024  3:44 PM Blood Culture Line, venous Preliminary         These results will be followed up by hospitalist.      Discharge Disposition   Discharged to home  Condition at discharge: Stable    Hospital Course   69-year-old female with history of endometrial serous carcinoma status post SNEHA/BSO 7/24, s/p cystotomy s/p suprapubic catheter, s/p 2 cycles of Taxol/Carbo (the last one on September 9) and multiple other chronic medical conditions presented to the ED with confusion and c/f UTI, found to have MADHU on CKD with hyponatremia, now being treated for ESBL Klebsiella pna bacteremia presumably 2/2 to urinary source though urine culture is negative. The following problems were addressed:    ESBL K pneumoniae bacteremia  Catheter-associated UTI  Hx of Klebsiella pna ESBL UTI  Mild right hydroureteronephrosis   Recent suprapubic cath placement - 8/2024- s/p exchange 9/22  S/p partial cystotomy with complex bladder closure and omental flap- 7/2024  Urology did SPT exchange on admission. UCx >100k colonies mixed elisa. Blood cx growing ESBL Klebsiella pneumoniae on 9/21, repeated Bcx 9/24 peripheral and 9/25 from her port NGTD. Presumably from urinary source per ID though UCx neg.  - IV ertapenem through port 9/22 - 10/5  - CBC with diff and CMP. Dr. Sanchez will follow labs at discharge until ID follow up. Fax labs to ID clinic.   - no ID f/up needed OP    B/l LE edema, improving  Anasarca  Hypoalbuminemia  MADHU on CKD stage II (baseline ~ 0.9)- likely ATN from bacteremia, resolved  Hypotonic hyponatremia- asymptomatic, resolved  Hypokalemia, resolved  Hypomagnesemia, resolved  Nephro consulted, appreciate their recs. Overall MADHU felt to be ATN from bacteremia. Echo nl. 24 hr urine protein elevated in 400s, HIV negative, C3-C4 wnl, hepatitis panel, lipid panel wnl, A1c low, BÁRBARA neg, serum light chains/IF unremarkable, TSH wnl. Lymphedema service consulted. K and mag repleted and did not need replacement for >48 hours before discharge.   - OP home OT lymphedema    Chronic microcytic/normocytic  anemia  Thrombocytopenia  Plt wnl 2 weeks ago and now low at 40-50K on admission, she has been started on her Taxol/Carbo infusions and this could be BM suppression from chemo, will monitor, no e/o bleeding so far. Improved throughout admission.    Chronic back pain- on home oxycodone per OP pain specialist- resumed PTA oxycodone 15 q6hr PRN.    Abdominal pain  Reports lower abdominal pain since her XL, SNEHA, BSO, EDIE and cystotomy repair with suprapubic bladder insertion and omental flap on 7/12/24. On chart review she has abdominal pain going back several months.   - continue PTA pain management for her chronic pain    R Elbow Pressure Injury vs Friction, POA  - WOC consulted 9/23   Wound location: R elbow  Last photo: 9/23  Wound due to: Pressure Injury and Friction? POA    Atrial fibrillation - on home Eliquis  - resumed PTA atenolol 25 mg daily on 9/25, decrease dose to 12.5 mg on 9/27 given soft BPs, continue this dose on discharge  - resume apixaban at lower dose 2.5 mg BID (due to renal function and thrombocytopenia)    Insomnia  - PTA on Ambien PRN    Serous uterine carcinoma s/p SNEHA/BSO  History of cervical cancer, s/p radiation therapy  Following with gyn/onc. Has oncology f/up with labs 10/3 (rescheduled from 9/25), discussed with her OP team and the IP team, would not do chemo while on abx but plan for after. They will reschedule infusions.     Chronic conditions:  # History of REMA-III, s/p laser therapy  # History of gastric bypass   # Hypertension   # Osteoarthritis  #Paranoid schizophrenia- pt not taking meds  #RAFAEL- pt not taking meds    Dispo: PT/OT - both rec TCU but patient would like to return home. Home with assist reasonable with max support for family members and assistive devices. Ordered hospital bed, wheelchair. Has home cares, PCA services.    Will have home PT, OT, and skilled nursing.       Consultations This Hospital Stay   CARE MANAGEMENT / SOCIAL WORK IP CONSULT  UROLOGY IP  CONSULT  WOUND OSTOMY CONTINENCE NURSE  IP CONSULT  NEPHROLOGY GENERAL ADULT IP CONSULT  LYMPHEDEMA THERAPY IP CONSULT  PHYSICAL THERAPY ADULT IP CONSULT  OCCUPATIONAL THERAPY ADULT IP CONSULT  INFECTIOUS DISEASE GENERAL ADULT IP CONSULT    Code Status   Prior    Time Spent on this Encounter   IJeanette MD, personally saw the patient today and spent greater than 30 minutes discharging this patient.       Jeanette Eagle (Toya) MD Sem[facility]  Internal Medicine/Pediatrics  Hospitalist    [facility] 5A ONCOLOGY  [address]  [place] [address]  Phone: [phone]  ______________________________________________________________________    Physical Exam   Vital Signs:                    Weight: 129 lbs 9.6 oz    General: awake, alert, in no acute distress  HEENT: NCAT, sclera anicteric, no nasal discharge, MMM  CV: RRR, no murmurs noted  Resp: CTAB, no increased WOB  Abd: Soft, mildly tender to palpation in lower abdomen, nondistended, +BS, no rebound or guarding  MSK: pitting peripheral edema could not fully examine due to lymphedema wraps, extremities warm and well perfused  Skin: warm, dry, no jaundice  Neuro:  Alert and oriented x4.\      Primary Care Physician   Maria Fernanda Eckert    Discharge Orders      Primary Care - Care Coordination Referral      Home Infusion Referral      OPAT enrollment and ID Clinic Referral      Resume Home Care Services    [facility]    Please resume PT/RN services, add OT     Reason for your hospital stay    You were hospitalized for confusion, found to have a bacterial infection in your bloodstream that most likely started as an urine infection. You had injury of your kidneys that is now resolved. You will need to be on antibiotics until 10/5/2024.     Activity    Your activity upon discharge: activity as tolerated     Adult [facility] Follow-up and recommended labs and tests    Follow up with primary care provider, Maria Fernanda Eckert, within 7 days for hospital follow- up.  No follow up  "labs or test are needed.      Follow up with your cancer team on 10/3.    Appointments on [place] and/or [facility] (with [facility] or [facility] provider or service). Call [phone] if you haven't heard regarding these appointments within 7 days of discharge.     When to contact your care team    Call your primary doctor if you have any of the following: fevers, chills, nausea, vomiting, worsening pain, not tolerating food or water, or any new concerning symptoms.     IV access    **Ordering Provider MUST call/page Care Coordinator/ to discuss arranging this service**    You are going home with the following vascular access device: Port-a-Cath.     Discharge Instructions    1. Please note the changes in your medications: apixaban was decreased from 5 mg twice a day to 2.5 mg twice a day; atenolol was changed from 25 mg daily to 12.5 mg daily. Please follow up with your primary care doctor for any changes.     2. Your IV antibiotic will be continued until 10/5.    It was my pleasure to meet you. Take care!    Jeanette LoraToyaAlia Cha MD  Internal Medicine/Pediatrics  Hospitalist     Wheelchair Order     Hospital Bed Order for DME - ONLY FOR DME    Hospital Bed Documentation:   Hospital bed is required for body positioning, to allow for safe transfers to wheelchair and standing and frequent changes in body position, not feasible in an ordinary bed     NOTE: Patient must have a \"Yes\" in one of the four following questions to qualify for a hospital bed.    1. Does the patient require positioning of the body in ways not feasible with an ordinary bed due to a medical condition that is expected to last at least 1 month? Yes (Please explain): chronic pain, orthopnea, malignancy    2. Does the patient require, for the alleviation of pain, positioning of the body in ways not feasible with an ordinary bed? Yes (Please explain): chronic pain, orthopnea, malignancy     3. Does the patient require the head of the bed " to be elevated more than 30 degrees most of the time due to congestive heart failure, chronic pulmonary disease, or aspiration? No    4. Does the patient require traction that can only be attached to a hospital bed? No    Additional Criteria:    Does the patient require frequent changes in body position and/or have an immediate need for change in body position? Yes - Patient qualifies for Semi Electric Bed     Trapeze Criteria:  (Patient must meet standard hospital bed criteria also)   1. Does patient need this device to sit up because of a respiratory condition, for change in body position for other medical reasons, or to get in or out of bed? No    I, the undersigned, certify that the above prescribed supplies are medically necessary for this patient and is both reasonable and necessary in reference to accepted standards of medical and necessary in reference to accepted standards of medical practice in the treatment of this patient's condition and is not prescribed as a convenience.     Diet    Follow this diet upon discharge: Current Diet:Orders Placed This Encounter      Regular diet, encourage more water!       Significant Results and Procedures   Most Recent 3 CBC's:  Recent Labs   Lab Test 09/27/24  0520 09/26/24  0533 09/25/24  0612   WBC 7.0 6.7 7.1   HGB 7.8* 7.5* 7.7*   MCV 89 88 86   * 66* 53*     Most Recent 3 BMP's:  Recent Labs   Lab Test 09/27/24  0520 09/26/24  0533 09/25/24  0612   * 135 133*   POTASSIUM 3.9 3.9 3.8   CHLORIDE 103 104 104   CO2 22 24 23   BUN 18.7 16.9 18.5   CR 0.99* 0.91 1.02*   ANIONGAP 9 7 6*   SAMUEL 8.3* 8.0* 7.9*   GLC 88 91 82     Most Recent 2 LFT's:  Recent Labs   Lab Test 09/24/24  0314 09/23/24  0305   AST 9 11   ALT 6 7   ALKPHOS 80 73   BILITOTAL 0.2 0.2   ,   Results for orders placed or performed during the hospital encounter of 09/20/24   Chest XR,  PA & LAT    Narrative    EXAM: XR CHEST 2 VIEWS  LOCATION: [facility]  DATE: 9/20/2024    INDICATION: dyspnea  COMPARISON: 5/3/2024      Impression    IMPRESSION: Normal heart size. Right chest port catheter tip at low SVC level. Calcified left basilar granuloma. Lungs otherwise clear. No pneumothorax or pleural effusion. Gas-filled loops of bowel in the upper abdomen.   US Renal Complete Non-Vascular    Narrative    EXAMINATION: US RENAL COMPLETE NON-VASCULAR, 9/21/2024 3:13 AM     COMPARISON: Renal ultrasound 7/14/2024, CTAP 7/21/2024    HISTORY: MADHU, please evaluate kidneys    TECHNIQUE: The kidneys and bladder were scanned in the standard  fashion with specialized ultrasound transducer(s) using both gray  scale and limited color/spectral Doppler techniques.    FINDINGS:    Right kidney: Measures 8.9 cm in length. Parenchyma is of normal  thickness and echogenicity. No focal mass. Right pelviectasis and  proximal ureteral dilation measuring up to 2.0 cm.    Left kidney: Measures 9.5 cm in length. Parenchyma is of normal  thickness and echogenicity. No focal mass. No hydronephrosis.     Bladder: Collapsed bladder with poorly visualized Shahid catheter in  place.      Impression    IMPRESSION:  Mild right hydroureteronephrosis. No left hydronephrosis.     I have personally reviewed the examination and initial interpretation  and I agree with the findings.    SORAYA STARKEY MD         SYSTEM ID:  [id number]   US Lower Extremity Venous Duplex Bilateral    Narrative    EXAMINATION: DOPPLER VENOUS ULTRASOUND OF BILATERAL LOWER EXTREMITIES,  9/22/2024 9:59 AM     COMPARISON: None.    HISTORY: Bilateral lower extremity edema    TECHNIQUE:  Gray-scale evaluation with compression, spectral flow and  color Doppler assessment of the deep venous system of both legs from  groin to knee, and then at the ankles.    FINDINGS:  In both lower extremities, the common femoral, femoral, and popliteal  veins demonstrate normal compressibility and blood flow. The posterior  tibial and peroneal  veins demonstrate normal compressibility.        Impression    IMPRESSION:  1.  No evidence of deep venous thrombosis in either lower extremity.    I have personally reviewed the examination and initial interpretation  and I agree with the findings.    SORAYA STARKEY MD         SYSTEM ID:  [id number]   XR Thoracic Spine 2 Views    Narrative    EXAM: XR THORACIC SPINE 2 VIEWS  LOCATION: [facility]  DATE: 2024    INDICATION: pain in pt with bacteremia, assess for possible fractures lesions  COMPARISON: None.      Impression    IMPRESSION: The cervicothoracic junction is not well assessed on the lateral projection due to superimposition of overlying structures. Where visualized, vertebral body heights maintained. Scoliosis. No spondylolisthesis. Multilevel spondylitic changes   of the thoracic spine.    Echo Complete     Value    LVEF  60-65%    Narrative    [id number]  [id number]  [id number]  [id number]^CLEO^ROYAL     [facility]  Echocardiography Laboratory  [address] [place] [id number]     Name: UQIQUE WILLIS  MRN: [id number]  : 1955  Study Date: 2024 09:11 AM  Age: 69 yrs  Gender: Female  Patient Location: Replaced by [facility]  Reason For Study: Heart Failure  Ordering Physician: ROYAL GALLO  Performed By: Heather Escobar     BSA: 1.6 m2  Height: 63 in  Weight: 137 lb  HR: 90  BP: 142/97 mmHg  ______________________________________________________________________________  Procedure  Complete Portable Echo Adult. Technically difficult study.Extremely poor  acoustic windows.  ______________________________________________________________________________  Interpretation Summary  Global and regional left ventricular function is normal with an EF of 60-65%.  Global right ventricular function is normal.  No significant valvular abnormalities were noted.  This study was compared with the study from  7/10/24 .  No significant changes noted.  ______________________________________________________________________________  Left Ventricle  Global and regional left ventricular function is normal with an EF of 60-65%.  Left ventricular size is normal. Left ventricular wall thickness cannot  evaluate. Left ventricular diastolic function is indeterminate.     Right Ventricle  The right ventricle is normal size. Global right ventricular function is  normal.     Mitral Valve  Mild mitral annular calcification is present.     Aortic Valve  The valve leaflets are not well visualized. On Doppler interrogation, there is  no significant stenosis or regurgitation.     Tricuspid Valve  The peak velocity of the tricuspid regurgitant jet is not obtainable.  Pulmonary artery systolic pressure cannot be assessed.     Pulmonic Valve  The pulmonic valve cannot be assessed.     Vessels  The aorta root is normal. The inferior vena cava cannot be assessed.     Pericardium  No pericardial effusion is present.     Compared to Previous Study  This study was compared with the study from 7/10/24 . No significant changes  noted.     ______________________________________________________________________________  MMode/2D Measurements & Calculations  LVOT diam: 2.2 cm  LVOT area: 3.8 cm2  TAPSE: 1.7 cm     Doppler Measurements & Calculations  MV E max steve: 53.8 cm/sec  MV A max steve: 109.3 cm/sec  MV E/A: 0.49  MV max P.4 mmHg  MV mean P.8 mmHg  MV V2 VTI: 17.7 cm  E/E' av.7  Lateral E/e': 6.2  Medial E/e': 13.1  RV S Steve: 12.4 cm/sec     ______________________________________________________________________________  Report approved by: Erick Dunn 2024 11:43 AM             Discharge Medications   Discharge Medication List as of 2024  4:51 PM        START taking these medications    Details   ertapenem (INVANZ) 1 GM vial Inject 1 g over 30 minutes into the vein every 24 hours for 7 days. Via port, Transitional            CONTINUE these medications which have CHANGED    Details   apixaban ANTICOAGULANT (ELIQUIS) 2.5 MG tablet Take 1 tablet (2.5 mg) by mouth 2 times daily., Disp-60 tablet, R-3, E-Prescribe      atenolol (TENORMIN) 25 MG tablet Take 0.5 tablets (12.5 mg) by mouth daily., Disp-30 tablet, R-3, E-Prescribe           CONTINUE these medications which have NOT CHANGED    Details   acetaminophen (TYLENOL) 325 MG tablet Take 2 tablets (650 mg) by mouth every 6 hours as needed for mild pain, Disp-24 tablet, R-0, E-Prescribe      albuterol (PROAIR HFA/PROVENTIL HFA/VENTOLIN HFA) 108 (90 Base) MCG/ACT inhaler Inhale 1-2 puffs into the lungs every 4 hours as needed for shortness of breath, HistoricalPharmacy may dispense brand covered by insurance (Proair, or proventil or ventolin or generic albuterol inhaler)      cyanocobalamin (CYANOCOBALAMIN) 1000 MCG/ML injection Inject 1 mL (1,000 mcg) into a muscle every month., Historical      dexAMETHasone (DECADRON) 4 MG tablet Take 2 tablets (8 mg) by mouth daily (with breakfast). Take daily x 3 days following chemo, Disp-6 tablet, R-11, E-Prescribe      diclofenac (VOLTAREN) 1 % topical gel Apply to: Skin on  Both/All Knee for pain. Topical 1% gel, 4 g applied TOPICALLY to lower extremities 3 times daily PRN ;, Historical      hydrocortisone 2.5 % cream Apply topically as neededHistorical      lidocaine (XYLOCAINE) 5 % external ointment Apply 1 Application topically as neededHistorical      naloxone (NARCAN) 4 MG/0.1ML nasal spray Spray 1 spray (4 mg) into one nostril alternating nostrils as needed for opioid reversal every 2-3 minutes until assistance arrives, Disp-2 each, R-0, E-Prescribe      ondansetron (ZOFRAN) 8 MG tablet Take 1 tablet (8 mg) by mouth every 8 hours as needed for nausea, Disp-20 tablet, R-0, E-Prescribe      oxyCODONE (ROXICODONE) 5 MG tablet Take 1 tablet (5 mg) by mouth every 6 hours as needed for breakthrough pain, Disp-30 tablet, R-0, E-Prescribe       polyethylene glycol (MIRALAX) 17 GM/Dose powder Take 17 g by mouth daily as needed for constipation, Disp-510 g, R-0, E-Prescribe      prochlorperazine (COMPAZINE) 5 MG tablet Take 1-2 tablets (5-10 mg) by mouth every 6 hours as needed for nausea or vomiting, Disp-40 tablet, R-0, E-Prescribe      senna-docusate (SENOKOT-S/PERICOLACE) 8.6-50 MG tablet Take 1 tablet by mouth 2 times daily, Disp-60 tablet, R-0, E-Prescribe      zolpidem (AMBIEN) 10 MG tablet Take 10 mg by mouth nightly as needed, Historical      calcium Citrate-vitamin D 500-400 MG-UNIT CHEW Take 1 tablet by mouth daily, Historical      childrens multivitamin w/iron (FLINTSTONES COMPLETE) 60 MG chewable tablet Take 2 tablets by mouth daily, Historical      cholecalciferol 125 MCG (5000 UT) CAPS Take 5000 mg 4 times a week, Historical      ferrous sulfate (CASSANDRA-IN-SOL) 75 (15 FE) MG/ML oral drops Take 15 mg by mouth daily, Historical           Allergies   Allergies   Allergen Reactions    Ibuprofen Nausea and Vomiting    Shrimp     Sulfa Antibiotics Rash     Patient started on bactrim 7/24/24 tolerating medication without rash on 7/25

## 2024-09-28 NOTE — PROGRESS NOTES
Physical Therapy Discharge Summary    Reason for therapy discharge:    Discharged to home with home therapy.    Progress towards therapy goal(s). See goals on Care Plan in [facility] electronic health record for goal details.  Goals not met.  Barriers to achieving goals:   discharge from facility.    Therapy recommendation(s):    Continued therapy is recommended.  Rationale/Recommendations:  Pt needing Ax1 for transfers and ambulation short distances. Pt managed 1 stair and from discussion prior to performing, appears to be near baseline as family assists BLE to lift to stairs. Pt would benefit from short TCU stay to improve LE strength and activity tolerance to improve IND and decrease caregiver burden. Pt declines PT, states she wants to go home with family assist and resume home PT/OT. While TCU would potentially help pt increase strength, pt appears safe to d/c home as long as family is aware of increased assist with pt either below baseline, or with new baseline..

## 2024-09-28 NOTE — PROGRESS NOTES
Reviewed discharge instruction with patient and daughter. Called home care to let them know that she's being discharged and that she needs teaching for IV med in AM when abx is due. Wheeled patient to discharge pharmacy and to front door than assisted into daughters care. She's happy to be going home.

## 2024-09-29 ENCOUNTER — PATIENT OUTREACH (OUTPATIENT)
Dept: CARE COORDINATION | Facility: CLINIC | Age: 69
End: 2024-09-29
Payer: COMMERCIAL

## 2024-09-29 LAB — BACTERIA BLD CULT: NO GROWTH

## 2024-09-30 ENCOUNTER — PATIENT OUTREACH (OUTPATIENT)
Dept: ONCOLOGY | Facility: CLINIC | Age: 69
End: 2024-09-30
Payer: COMMERCIAL

## 2024-09-30 LAB — BACTERIA BLD CULT: NO GROWTH

## 2024-09-30 NOTE — PROGRESS NOTES
Clinic Care Coordination Chart Review     Situation: Patient chart reviewed by care coordination leader.     Background: Primary Care Care Coordination referral entered by Inpatient Care Manager through IP to Amb CM handoff process.     Assessment: Per chart review, patient has primary care provider outside of [facility].  Primary Care Care Coordination team supports patients in collaboration with their [facility] PCP therefore this patient would not be a candidate for outreach.       Plan/Recommendations: Patient can be encouraged to follow discharge instructions as outlined on AVS. No intervention planned by [facility] Primary Care Care Coordination team at this time.     Ella Cordova, KATHEN, RN   Manager of Ambulatory Care Management  [facility]

## 2024-09-30 NOTE — PROGRESS NOTES
[facility]: Transitions of Care Note  Chief Complaint   Patient presents with    Clinic Care Coordination - Post Hospital     Most Recent Admission Date: 9/20/2024   Most Recent Admission Diagnosis: Orthopnea - R06.01  Hyponatremia - E87.1  MADHU (acute kidney injury) (H) - N17.9  Bilateral lower extremity edema - R60.0  Urinary tract infection associated with cystostomy catheter, initial encounter (H) - T83.510A, N39.0     Most Recent Discharge Date: 9/27/2024   Most Recent Discharge Diagnosis: Urinary tract infection associated with cystostomy catheter, initial encounter (H) - T83.510A, N39.0  Orthopnea - R06.01  Bilateral lower extremity edema - R60.0  Hyponatremia - E87.1  MADHU (acute kidney injury) (H) - N17.9  Chronic low back pain, unspecified back pain laterality, unspecified whether sciatica present - M54.50, G89.29  Paroxysmal atrial fibrillation (H) - I48.0  Bacteremia due to Klebsiella pneumoniae - R78.81, B96.1  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  No fevers.  Eating and drinking well.  No nausea/vomiting.  How are your symptoms? (Red Flag symptoms escalate to triage hotline per guidelines): Improved  Do you know how to contact your clinic care team if you have future questions or changes to your health status? : Yes  Does the patient have their discharge instructions? : Yes  Does the patient have questions regarding their discharge instructions? : No  Were you started on any new medications or were there changes to any of your previous medications? : Yes  Does the patient have all of their medications?: Yes  Do you have questions regarding any of your medications? : No  Do you have all of your needed medical  "supplies or equipment (DME)?  (i.e. oxygen tank, CPAP, cane, etc.): Yes    Post-op (Clinicians Only)  Did the patient have surgery or a procedure: No    Notes:   Writer received the following message from the Inpatient Gyn Onc Team:     \"This is a patient of Dr. Louise who was hospitalized and her chemotherapy needs to be rescheduled, she is on a course of etrapenem until 10/5. So will need an appointment after that to see provider and see if she is able to re-initiate her chemo then.\"     Cancer Center Scheduling Team was tasked to assist patient with cancelling her chemotherapy appointment for this week, and rescheduling for a later date sometime past 10/5/24 (and to adjust her future appointments accordingly as well).  Writer will follow for scheduling updates.      Follow up Plan     Discharge Follow-Up  Discharge follow up appointment scheduled in alignment with recommended follow up timeframe or Transitions of Risk Category? (Low = within 30 days; Moderate= within 14 days; High= within 7 days): Yes  Discharge Follow Up Appointment Date: 10/23/24  Discharge Follow Up Appointment Scheduled with?: Specialty Care Provider    Notes: Patient seemed unsure if her home care team is administering her IV antibiotics or not.  She mentioned that a home care nurse did come out and visit her recently, but she couldn't recall which date.  Writer placed telephone call to [facility] this afternoon.  Confirmed they are dispensing the IV antibiotics, but writer was informed that patient is contracted with a different home care agency ([facility]).      Writer placed telephone call to [facility] this afternoon (phone: [phone], and had to leave a voicemail message with patient's nurse requesting callback.    Future Appointments   Date Time Provider Department Center   10/23/2024  3:45 PM Marcia Painting APRN CNP [facility] [place]   10/25/2024 10:00 AM  [facility] LAB DRAW [facility]   10/25/2024 " 10:30 AM  ONC INFUSION NURSE [facility]     For any urgent concerns, please contact our 24 hour clinic line:   [place]: [phone]     Nicholas Jarrell, RN, BSN, OCN  RN Care Coordinator - Oncology  [facility]

## 2024-10-01 ENCOUNTER — DOCUMENTATION ONLY (OUTPATIENT)
Dept: INFECTIOUS DISEASES | Facility: CLINIC | Age: 69
End: 2024-10-01
Payer: COMMERCIAL

## 2024-10-01 ENCOUNTER — DOCUMENTATION ONLY (OUTPATIENT)
Dept: ANTICOAGULATION | Facility: CLINIC | Age: 69
End: 2024-10-01
Payer: COMMERCIAL

## 2024-10-01 ENCOUNTER — LAB REQUISITION (OUTPATIENT)
Dept: LAB | Facility: CLINIC | Age: 69
End: 2024-10-01
Payer: COMMERCIAL

## 2024-10-01 DIAGNOSIS — T83.510A: ICD-10-CM

## 2024-10-01 LAB
ALBUMIN SERPL BCG-MCNC: 2.1 G/DL (ref 3.5–5.2)
ALP SERPL-CCNC: 98 U/L (ref 40–150)
ALT SERPL W P-5'-P-CCNC: 8 U/L (ref 0–50)
ANION GAP SERPL CALCULATED.3IONS-SCNC: 10 MMOL/L (ref 7–15)
AST SERPL W P-5'-P-CCNC: 8 U/L (ref 0–45)
BILIRUB SERPL-MCNC: 0.2 MG/DL
BUN SERPL-MCNC: 8.5 MG/DL (ref 8–23)
CALCIUM SERPL-MCNC: 7.5 MG/DL (ref 8.8–10.4)
CHLORIDE SERPL-SCNC: 104 MMOL/L (ref 98–107)
CREAT SERPL-MCNC: 0.84 MG/DL (ref 0.51–0.95)
EGFRCR SERPLBLD CKD-EPI 2021: 74 ML/MIN/1.73M2
GLUCOSE SERPL-MCNC: 94 MG/DL (ref 70–99)
HCO3 SERPL-SCNC: 24 MMOL/L (ref 22–29)
HOLD SPECIMEN: NORMAL
HOLD SPECIMEN: NORMAL
POTASSIUM SERPL-SCNC: 3.6 MMOL/L (ref 3.4–5.3)
PROT SERPL-MCNC: 5 G/DL (ref 6.4–8.3)
SODIUM SERPL-SCNC: 138 MMOL/L (ref 135–145)

## 2024-10-01 PROCEDURE — 80053 COMPREHEN METABOLIC PANEL: CPT | Performed by: INTERNAL MEDICINE

## 2024-10-01 NOTE — PROGRESS NOTES
ANTICOAGULATION DIRECT ORAL ANTICOAGULANT MONITORING    SUBJECTIVE     The [facility]ticoagulation [facility] is evaluating [facility]'s Apixaban (Eliquis) as part of its Anticoagulation Monitoring Program.    Indication:Atrial Fibrillation  Current dose per medication list: Apixaban 2.5 mg BID  Recent hospitalizations/ED/Office Visits for bleeding/clotting concerns: No  Other bleeding or side effect concerns: Yes: vaginal bleeding noted 08/2024  Additional findings: several recent hospitalizations:  7/15/24 hospital discharge summary notes this: Her apixiban was restarted at a DVT prophylactic dose with plans to increase to her baseline therapeutic dose 2 weeks after surgery.  Patient was re-admitted 7/21 and 7/29/24. On 8/20/24 she saw cardiology, they prescribed the correct dose of apixaban; however, on 9/27/24 when she was discharged from the hospital it was documented - resume apixaban at lower dose 2.5 mg BID due to renal function and thrombocytopenia.      OBJECTIVE     Age: 69 year old    Wt Readings from Last 4 Encounters:   09/27/24 58.8 kg (129 lb 9.6 oz)   09/09/24 68.9 kg (151 lb 12.8 oz)   09/04/24 63.5 kg (140 lb)   08/27/24 63.5 kg (140 lb)          Lab Results   Component Value Date    CR 0.99 (H) 09/27/2024    CR 0.91 09/26/2024    CR 1.02 (H) 09/25/2024   CR was 1.52 on 9/23/24    Creatinine Clearance (using actual bodyweight, mL/min):     Lab Results   Component Value Date    HGB 7.8 09/27/2024    HGB 11.9 01/26/2015     09/27/2024     01/26/2015     ASSESSMENT/PLAN       she was re-admitted 7/21 and 7/29 so follow up was a little messy. on 8/20/24 she saw cardiology, they prescribed the correct dose of apixaban. however on 9/27 when she was discharged from the hospital it was documented - resume apixaban at lower dose 2.5 mg BID (due to renal function and thrombocytopenia). In summary, agree with lower dose due to documented exception.   A chart review for Direct  Oral Anticoagulant (DOAC) Stewardship has been completed for:     Dosing: dose appropriate for renal function and and thrombocytopenia    Plan made with [facility] Pharmacist Sona Cifuentes, RN  Anticoagulation Clinic

## 2024-10-03 NOTE — PROGRESS NOTES
[facility]: Cancer Care Note                                                          Writer attempted calling patient's home care agency again this morning ([employer], phone: [phone]), to confirm they are managing patient's in-home IV antibiotics - since no callback has been received from them yet after an initial message was left on 10/1/24.      Writer ended up having to leave another voicemail message with one of their nurse managers, Marcia.  Requested callback, and provided direct phone number for this RN in message.      Addendum 10/3/24 12:08 PM - Received callback from Marcia, RN Manager at [employer], regarding patient.  Marcia confirmed they re-opened home care services for patient as of Sunday 9/29/24.  Per Marcia, patient's family members were educated on doing the actual administration of the in-home IV antibiotics - but they are managing patient's in-home lab draws, port dressing changes, and with providing patient and family education.  Marcia provided direct number for any additional questions or concerns ([phone]).    Nicholas Jarrell, RN, BSN, OCN  RN Care Coordinator - Oncology  [facility]

## 2024-10-04 ENCOUNTER — TELEPHONE (OUTPATIENT)
Dept: INFECTIOUS DISEASES | Facility: CLINIC | Age: 69
End: 2024-10-04
Payer: COMMERCIAL

## 2024-10-04 NOTE — TELEPHONE ENCOUNTER
[facility] Call Center    Phone Message    May a detailed message be left on voicemail: yes     Reason for Call: Padmini- [facility] called to notify provider that they were unable to draw pt's lab. She states they were unable to draw blood from the line and peripherally. Padmini is wondering if provider wants them to reattempt lab draw or labs are not needed. Please advise. Call back #: [phone].    Action Taken: Other: ID    Travel Screening: Not Applicable     Date of Service:

## 2024-10-07 ENCOUNTER — DOCUMENTATION ONLY (OUTPATIENT)
Dept: INFECTIOUS DISEASES | Facility: CLINIC | Age: 69
End: 2024-10-07
Payer: COMMERCIAL

## 2024-10-07 NOTE — TELEPHONE ENCOUNTER
Patient completed OPAT on 10/05/2024. Per discharge note, No further labs or ID follow up needed.   Never smoker

## 2024-10-07 NOTE — PROGRESS NOTES
Documentation of OPAT Completion    OPAT completed on: 10/05    Date Infusion company notified on: 10/04    PICC Pulled on: Patient has port, not managed by ID    Episode closed? yes    Flowsheet updated? yes    Patient removed from OPAT list? yes

## 2024-10-09 ENCOUNTER — PATIENT OUTREACH (OUTPATIENT)
Dept: ONCOLOGY | Facility: CLINIC | Age: 69
End: 2024-10-09
Payer: COMMERCIAL

## 2024-10-09 NOTE — PROGRESS NOTES
[facility]: Cancer Care Note                                                          Received message from cancer clinic scheduling team, that patient had called in regarding concerns about her port.  Writer placed telephone call to patient this afternoon to discuss.    Patient shares concern that nobody has been able to flush her port since she got out of the hospital.  Per patient, she feels that a home care nurse did try, but she was under the impression that they had difficulty getting blood return.      Advised patient that writer will call her home care agency to get more information from them.  However, reassured patient that while she is here in our clinic next week (10/15/24), she is scheduled for port labs - so her port will be assessed and flushed at that time.  Patient verbalized understanding.    Writer did leave a voicemail message with SHOAIB Kennedy manager with [employer], this afternoon - requested a callback to discuss any updates or issues they have noted with patient's port.  Provided direct callback number for this RN in message.      Addendum 10/10/24 9:50 AM - Received callback from Marcia with [employer].  Per Marcia, one of their nurses did have difficulty getting enough blood return from patient's port last week for a lab draw (they got some, but not quite enough for all of the lab tests).  Marcia states that since patient's IV antibiotics were finished on 10/5/24, they are no longer managing her port - and patient's port will need to be managed in the Oncology Clinic.  Patient has appointment in our clinic on 10/15/24.    Nicholas Jarrell, RN, BSN, OCN  RN Care Coordinator - Oncology  [facility]

## 2024-10-10 ENCOUNTER — HOSPITAL ENCOUNTER (EMERGENCY)
Facility: CLINIC | Age: 69
Discharge: HOME | End: 2024-10-10
Attending: STUDENT IN AN ORGANIZED HEALTH CARE EDUCATION/TRAINING PROGRAM | Admitting: STUDENT IN AN ORGANIZED HEALTH CARE EDUCATION/TRAINING PROGRAM
Payer: COMMERCIAL

## 2024-10-10 VITALS
OXYGEN SATURATION: 99 % | HEIGHT: 63 IN | RESPIRATION RATE: 18 BRPM | HEART RATE: 80 BPM | WEIGHT: 140 LBS | BODY MASS INDEX: 24.8 KG/M2 | TEMPERATURE: 98.4 F | DIASTOLIC BLOOD PRESSURE: 72 MMHG | SYSTOLIC BLOOD PRESSURE: 130 MMHG

## 2024-10-10 DIAGNOSIS — N30.01 ACUTE CYSTITIS WITH HEMATURIA: Primary | ICD-10-CM

## 2024-10-10 DIAGNOSIS — Z93.59 SUPRAPUBIC CATHETER (H): ICD-10-CM

## 2024-10-10 DIAGNOSIS — R11.0 NAUSEA: ICD-10-CM

## 2024-10-10 DIAGNOSIS — R31.9 HEMATURIA, UNSPECIFIED TYPE: ICD-10-CM

## 2024-10-10 LAB
ALBUMIN SERPL BCG-MCNC: 2.4 G/DL (ref 3.5–5.2)
ALBUMIN UR-MCNC: 70 MG/DL
ALP SERPL-CCNC: 101 U/L (ref 40–150)
ALT SERPL W P-5'-P-CCNC: 6 U/L (ref 0–50)
ANION GAP SERPL CALCULATED.3IONS-SCNC: 7 MMOL/L (ref 7–15)
APPEARANCE UR: ABNORMAL
AST SERPL W P-5'-P-CCNC: 13 U/L (ref 0–45)
BACTERIA #/AREA URNS HPF: ABNORMAL /HPF
BASOPHILS # BLD AUTO: 0 10E3/UL (ref 0–0.2)
BASOPHILS NFR BLD AUTO: 1 %
BILIRUB SERPL-MCNC: 0.2 MG/DL
BILIRUB UR QL STRIP: NEGATIVE
BUN SERPL-MCNC: 10 MG/DL (ref 8–23)
CALCIUM SERPL-MCNC: 8.1 MG/DL (ref 8.8–10.4)
CHLORIDE SERPL-SCNC: 106 MMOL/L (ref 98–107)
COLOR UR AUTO: ABNORMAL
CREAT SERPL-MCNC: 0.8 MG/DL (ref 0.51–0.95)
EGFRCR SERPLBLD CKD-EPI 2021: 78 ML/MIN/1.73M2
EOSINOPHIL # BLD AUTO: 0 10E3/UL (ref 0–0.7)
EOSINOPHIL NFR BLD AUTO: 0 %
ERYTHROCYTE [DISTWIDTH] IN BLOOD BY AUTOMATED COUNT: 21.8 % (ref 10–15)
GLUCOSE SERPL-MCNC: 93 MG/DL (ref 70–99)
GLUCOSE UR STRIP-MCNC: NEGATIVE MG/DL
HCO3 SERPL-SCNC: 25 MMOL/L (ref 22–29)
HCT VFR BLD AUTO: 25.3 % (ref 35–47)
HGB BLD-MCNC: 7.8 G/DL (ref 11.7–15.7)
HGB UR QL STRIP: ABNORMAL
IMM GRANULOCYTES # BLD: 0 10E3/UL
IMM GRANULOCYTES NFR BLD: 0 %
INR PPP: 1.14 (ref 0.85–1.15)
KETONES UR STRIP-MCNC: NEGATIVE MG/DL
LEUKOCYTE ESTERASE UR QL STRIP: ABNORMAL
LYMPHOCYTES # BLD AUTO: 1.2 10E3/UL (ref 0.8–5.3)
LYMPHOCYTES NFR BLD AUTO: 22 %
MCH RBC QN AUTO: 29.2 PG (ref 26.5–33)
MCHC RBC AUTO-ENTMCNC: 30.8 G/DL (ref 31.5–36.5)
MCV RBC AUTO: 95 FL (ref 78–100)
MONOCYTES # BLD AUTO: 0.7 10E3/UL (ref 0–1.3)
MONOCYTES NFR BLD AUTO: 13 %
MUCOUS THREADS #/AREA URNS LPF: PRESENT /LPF
NEUTROPHILS # BLD AUTO: 3.4 10E3/UL (ref 1.6–8.3)
NEUTROPHILS NFR BLD AUTO: 64 %
NITRATE UR QL: NEGATIVE
NRBC # BLD AUTO: 0 10E3/UL
NRBC BLD AUTO-RTO: 0 /100
PH UR STRIP: 6 [PH] (ref 5–7)
PLATELET # BLD AUTO: 313 10E3/UL (ref 150–450)
POTASSIUM SERPL-SCNC: 3.9 MMOL/L (ref 3.4–5.3)
PROT SERPL-MCNC: 5.4 G/DL (ref 6.4–8.3)
RBC # BLD AUTO: 2.67 10E6/UL (ref 3.8–5.2)
RBC URINE: 135 /HPF
SODIUM SERPL-SCNC: 138 MMOL/L (ref 135–145)
SP GR UR STRIP: 1.01 (ref 1–1.03)
SQUAMOUS EPITHELIAL: 2 /HPF
UROBILINOGEN UR STRIP-MCNC: NORMAL MG/DL
WBC # BLD AUTO: 5.3 10E3/UL (ref 4–11)
WBC CLUMPS #/AREA URNS HPF: PRESENT /HPF
WBC URINE: >182 /HPF

## 2024-10-10 PROCEDURE — 87088 URINE BACTERIA CULTURE: CPT | Performed by: STUDENT IN AN ORGANIZED HEALTH CARE EDUCATION/TRAINING PROGRAM

## 2024-10-10 PROCEDURE — 85610 PROTHROMBIN TIME: CPT | Performed by: STUDENT IN AN ORGANIZED HEALTH CARE EDUCATION/TRAINING PROGRAM

## 2024-10-10 PROCEDURE — 82040 ASSAY OF SERUM ALBUMIN: CPT | Performed by: STUDENT IN AN ORGANIZED HEALTH CARE EDUCATION/TRAINING PROGRAM

## 2024-10-10 PROCEDURE — 120N000002 HC R&B MED SURG/OB UMMC

## 2024-10-10 PROCEDURE — 250N000011 HC RX IP 250 OP 636: Performed by: STUDENT IN AN ORGANIZED HEALTH CARE EDUCATION/TRAINING PROGRAM

## 2024-10-10 PROCEDURE — 99285 EMERGENCY DEPT VISIT HI MDM: CPT | Performed by: STUDENT IN AN ORGANIZED HEALTH CARE EDUCATION/TRAINING PROGRAM

## 2024-10-10 PROCEDURE — 96365 THER/PROPH/DIAG IV INF INIT: CPT | Performed by: STUDENT IN AN ORGANIZED HEALTH CARE EDUCATION/TRAINING PROGRAM

## 2024-10-10 PROCEDURE — 85025 COMPLETE CBC W/AUTO DIFF WBC: CPT | Performed by: STUDENT IN AN ORGANIZED HEALTH CARE EDUCATION/TRAINING PROGRAM

## 2024-10-10 PROCEDURE — 250N000013 HC RX MED GY IP 250 OP 250 PS 637: Performed by: STUDENT IN AN ORGANIZED HEALTH CARE EDUCATION/TRAINING PROGRAM

## 2024-10-10 PROCEDURE — 81001 URINALYSIS AUTO W/SCOPE: CPT | Performed by: STUDENT IN AN ORGANIZED HEALTH CARE EDUCATION/TRAINING PROGRAM

## 2024-10-10 PROCEDURE — 82247 BILIRUBIN TOTAL: CPT | Performed by: STUDENT IN AN ORGANIZED HEALTH CARE EDUCATION/TRAINING PROGRAM

## 2024-10-10 PROCEDURE — 99285 EMERGENCY DEPT VISIT HI MDM: CPT | Mod: 25 | Performed by: STUDENT IN AN ORGANIZED HEALTH CARE EDUCATION/TRAINING PROGRAM

## 2024-10-10 PROCEDURE — 84155 ASSAY OF PROTEIN SERUM: CPT | Performed by: STUDENT IN AN ORGANIZED HEALTH CARE EDUCATION/TRAINING PROGRAM

## 2024-10-10 PROCEDURE — 99284 EMERGENCY DEPT VISIT MOD MDM: CPT | Mod: 25 | Performed by: STUDENT IN AN ORGANIZED HEALTH CARE EDUCATION/TRAINING PROGRAM

## 2024-10-10 PROCEDURE — 87106 FUNGI IDENTIFICATION YEAST: CPT | Performed by: STUDENT IN AN ORGANIZED HEALTH CARE EDUCATION/TRAINING PROGRAM

## 2024-10-10 PROCEDURE — 36415 COLL VENOUS BLD VENIPUNCTURE: CPT | Performed by: STUDENT IN AN ORGANIZED HEALTH CARE EDUCATION/TRAINING PROGRAM

## 2024-10-10 RX ORDER — OXYCODONE HYDROCHLORIDE 5 MG/1
5 TABLET ORAL EVERY 4 HOURS PRN
Status: DISCONTINUED | OUTPATIENT
Start: 2024-10-10 | End: 2024-10-10 | Stop reason: HOSPADM

## 2024-10-10 RX ORDER — MEROPENEM 1 G/1
1 INJECTION, POWDER, FOR SOLUTION INTRAVENOUS ONCE
Status: COMPLETED | OUTPATIENT
Start: 2024-10-10 | End: 2024-10-10

## 2024-10-10 RX ORDER — ONDANSETRON 4 MG/1
4 TABLET, ORALLY DISINTEGRATING ORAL ONCE
Status: COMPLETED | OUTPATIENT
Start: 2024-10-10 | End: 2024-10-10

## 2024-10-10 RX ADMIN — ONDANSETRON 4 MG: 4 TABLET, ORALLY DISINTEGRATING ORAL at 10:12

## 2024-10-10 RX ADMIN — MEROPENEM 1 G: 1 INJECTION, POWDER, FOR SOLUTION INTRAVENOUS at 12:55

## 2024-10-10 RX ADMIN — OXYCODONE HYDROCHLORIDE 5 MG: 5 TABLET ORAL at 16:26

## 2024-10-10 ASSESSMENT — ACTIVITIES OF DAILY LIVING (ADL)
ADLS_ACUITY_SCORE: 38
ADLS_ACUITY_SCORE: 48
ADLS_ACUITY_SCORE: 38
ADLS_ACUITY_SCORE: 38

## 2024-10-10 NOTE — H&P
[facility] Gyn/Onc  History and Physical    Alis Hartman MRN# [id number]   Age: 69 year old YOB: 1955     Date of Admission:  10/10/2024    Primary care provider: Maria Fernanda Eckert         Chief Complaint:   Hematuria         History of Present Illness:   Alis Hartman is a 69 year old with a PMH of serous endometrial carcinoma s/p SNEHA, BSO 7/2024, cystotomy s/p suprapubic catheter recently replaced in the setting of ESBL Klebsiella pneumoniae bacteremia 2/2 urinary source with negative cultures, now presenting with hematuria.     She reports she did not take her Eliquis for 3 days, as she felt the pill would not go down. Yesterday, she took it again. She then noticed her urine was blood-tinged overnight and this AM. She endorses some left-sided and lower abdominal pain that comes and goes and is consistent with her prior pain; no changes. She also endorses mild nausea last night that improved with zofran. She otherwise denies fever/chills, confusion, nausea, vomiting, abdominal pain, changes to her bowel movements, vaginal bleeding, pain or discharge at her suprapubic catheter site, and changes to the swelling in her legs. She presented today because she was worried about the blood in her urine as well as home care's inability to access her port (RN was able to in the ED without difficulty).            Cancer Treatment History:   Oncology history:   3/1996: Moderately differentiated squamous cell carcinoma. She was treated with primary radiation therapy, unsure if she also had chemotherapy or not.  This treatment was at [facility].    12/2014: Exam under anesthesia with LASER to the vagina for VAIN 3    12/27/18:  Pap:  HSIL, HPV+    5/24/19: PROCEDURES: Vaginal colposcopy, vaginal biopsy, CO2 laser of the vagina  VAGINAL BIOPSY: High grade squamous intraepithelial lesion (vaginal intraepithelial neoplasia 3)     10/14/19: Pap HSIL/other HPV+  2/28/2020: biopsy of vagina Vain III;  MRI recommended prior to OR; however, patient did not complete this  3/12/2020: EUA, biopsies, LASER: VAIN III    6/8/2021: Exam under anesthesia, vaginal biopsies, laser ablation of the upper vagina with Dr. Perez, biopsies returned showing necrosis, no dysplasia. Hyperbaric oxygen therapy discussed and referral place. Patient was unable to pursue this due to need for transportation to the [facility].    4/23/24: US pelvis: The uterus contains both cystic and solid components and the endometrium is not defined.      5/3/24: CT cap IMPRESSION: New moderate bilateral hydroureteronephrosis, which may be secondary to the mass effect of the adjacent enlarged uterus with marked distention of the endometrial canal by intermediate density material, which has increased since CT 4/6/2023. Findings may be related to cervical stenosis secondary to prior pelvic radiation.     5/17/24: Endometrial curretings: Endometrial serous carcinoma with extensive necrosis     7/12/24: Diagnostic laparoscopy converted to exploratory laparotomy, total abdominal hysterectomy, bilateral salpingo-oophorectomy, lysis of adhesions >60 min. Repair bladder; insertion of supra-pubic catheter     7/29/24-8/9/24: Admission for altered mental status     8/8/24: Cycle #1 Paclitaxel/Carboplatin given inpatient.  8/29/24: Cycle #2 Paclitaxel/Carboplatin planned.          Past Medical History:     Past Medical History:   Diagnosis Date    Atrial fibrillation (H)     Depression     Hypertension     Malignant neoplasm of endocervix (H)     Tx with radiation    Orthopnea 9/21/2024    Other chronic pain     Low back    Schizophrenia (H)     Urinary incontinence             Past Surgical History:      Past Surgical History:   Procedure Laterality Date    ABDOMINOPLASTY      BIOPSY CERVICAL, LOCAL EXCISION, SINGLE/MULTIPLE  10/26/2011    Procedure:BIOPSY CERVICAL, LOCAL EXCISION, SINGLE/MULTIPLE; EUA, cervical biopsies; Surgeon:BETTY TINEO; Location:  OR    BIOPSY VAGINAL N/A 6/8/2021    Procedure: BIOPSY, VAGINA;  Surgeon: Dany Perez MD;  Location: [facility] OR    CYSTOSCOPY N/A 5/17/2024    Procedure: Cystoscopy;  Surgeon: Dany Perez MD;  Location: [facility] OR    CYSTOSTOMY, INSERT TUBE SUPRAPUBIC, COMBINED  7/12/2024    Procedure: Insertion of supra-pubic catheter;  Surgeon: Dany Perez MD;  Location: [facility] OR    DILATION AND CURETTAGE, WITH ULTRASOUND GUIDANCE N/A 5/17/2024    Procedure: Dilation and curettage of the uterus with drainage of uterine fluid under ultrasound guidance, lysis of vaginal adhesions;  Surgeon: Dany Perez MD;  Location: [facility] OR    EXAM UNDER ANESTHESIA PELVIC N/A 3/12/2020    Procedure: Exam under anesthsia, vaginal biopsies, possible CO2 laser of the vagina;  Surgeon: Lilliam Roy MD;  Location: [facility] OR    GI SURGERY  2004    gastric bypass    HYSTERECTOMY TOTAL ABDOMINAL, BILATERAL SALPINGO-OOPHORECTOMY, COMBINED Bilateral 7/12/2024    Procedure: Diagnostic laparoscopy converted to exploratory laparotomy, total abdominal hysterectomy, bilateral salpingo-oophorectomy, lysis of adhesions >60 min;  Surgeon: Dany Perez MD;  Location: [facility] OR    INSERT PORT VASCULAR ACCESS N/A 8/26/2024    Procedure: Insert port vascular access;  Surgeon: Avery Gregory MD;  Location: [facility] OR    IR CHEST PORT PLACEMENT > 5 YRS OF AGE  8/26/2024    LASER CO2 VAGINA N/A 3/2/2015    Procedure: LASER CO2 VAGINA;  Surgeon: Mariela Abdalla MD;  Location: [facility] OR    LASER CO2 VAGINA N/A 5/24/2019    Procedure: Exam under anesthesia, vaginal biopsies, CO2 laser of the vagina;  Surgeon: Lilliam Roy MD;  Location: [facility] OR    LASER CO2 VAGINA N/A 6/8/2021    Procedure: Exam under anesthesia, laser ablation of the upper vagina;  Surgeon: Dany Perez MD;  Location: [facility] OR    REPAIR BLADDER N/A 7/12/2024    Procedure: Repair bladder;  Surgeon: Dany Perez MD;  Location: [facility] OR            Social History:     Social History      Tobacco Use    Smoking status: Never    Smokeless tobacco: Never   Substance Use Topics    Alcohol use: Not Currently            Family History:     Family History   Problem Relation Age of Onset    Heart Disease Father     Heart Failure Father     No Known Problems Brother     No Known Problems Brother     No Known Problems Brother     Pancreatitis Brother     Hypertension Sister     Peripheral Vascular Disease Sister     No Known Problems Sister     No Known Problems Son     No Known Problems Daughter             Allergies:     Allergies   Allergen Reactions    Ibuprofen Nausea and Vomiting    Shrimp     Sulfa Antibiotics Rash     Patient started on bactrim 7/24/24 tolerating medication without rash on 7/25             Medications:     Current Facility-Administered Medications   Medication Dose Route Frequency Provider Last Rate Last Admin    pharmacy alert - intermittent dosing  1 each Other See Admin Instructions Marcia Barnhart MD         Current Outpatient Medications   Medication Sig Dispense Refill    acetaminophen (TYLENOL) 325 MG tablet Take 2 tablets (650 mg) by mouth every 6 hours as needed for mild pain 24 tablet 0    albuterol (PROAIR HFA/PROVENTIL HFA/VENTOLIN HFA) 108 (90 Base) MCG/ACT inhaler Inhale 1-2 puffs into the lungs every 4 hours as needed for shortness of breath      apixaban ANTICOAGULANT (ELIQUIS) 2.5 MG tablet Take 1 tablet (2.5 mg) by mouth 2 times daily. 60 tablet 3    atenolol (TENORMIN) 25 MG tablet Take 0.5 tablets (12.5 mg) by mouth daily. 30 tablet 3    calcium Citrate-vitamin D 500-400 MG-UNIT CHEW Take 1 tablet by mouth daily      childrens multivitamin w/iron (FLINTSTONES COMPLETE) 60 MG chewable tablet Take 2 tablets by mouth daily      cholecalciferol 125 MCG (5000 UT) CAPS Take 5000 mg 4 times a week      cyanocobalamin (CYANOCOBALAMIN) 1000 MCG/ML injection Inject 1 mL (1,000 mcg) into a muscle every month.      dexAMETHasone (DECADRON) 4 MG tablet Take 2 tablets (8 mg)  by mouth daily (with breakfast). Take daily x 3 days following chemo 6 tablet 11    diclofenac (VOLTAREN) 1 % topical gel Apply to: Skin on  Both/All Knee for pain. Topical 1% gel, 4 g applied TOPICALLY to lower extremities 3 times daily PRN ;      ferrous sulfate (CASSANDRA-IN-SOL) 75 (15 FE) MG/ML oral drops Take 15 mg by mouth daily      hydrocortisone 2.5 % cream Apply topically as needed      lidocaine (XYLOCAINE) 5 % external ointment Apply 1 Application topically as needed      naloxone (NARCAN) 4 MG/0.1ML nasal spray Spray 1 spray (4 mg) into one nostril alternating nostrils as needed for opioid reversal every 2-3 minutes until assistance arrives 2 each 0    ondansetron (ZOFRAN) 8 MG tablet Take 1 tablet (8 mg) by mouth every 8 hours as needed for nausea 20 tablet 0    oxyCODONE (ROXICODONE) 5 MG tablet Take 1 tablet (5 mg) by mouth every 6 hours as needed for breakthrough pain 30 tablet 0    polyethylene glycol (MIRALAX) 17 GM/Dose powder Take 17 g by mouth daily as needed for constipation 510 g 0    prochlorperazine (COMPAZINE) 5 MG tablet Take 1-2 tablets (5-10 mg) by mouth every 6 hours as needed for nausea or vomiting 40 tablet 0    senna-docusate (SENOKOT-S/PERICOLACE) 8.6-50 MG tablet Take 1 tablet by mouth 2 times daily 60 tablet 0    zolpidem (AMBIEN) 10 MG tablet Take 10 mg by mouth nightly as needed              Review of Systems:     CONSTITUTIONAL: Negative for fever, chills  SKIN: Negative for rashes, erythema at port site, suprapubic catheter site  RESPIRATORY: Negative cough, SOB  CARDIOVASCULAR: Negative for chest pain, dyspnea  GASTROINTESTINAL: Negative for nausea, vomiting, diarrhea, constipation. + Abdominal pain, not new or changed from prior pain  GENITOURINARY: + Hematuria. Negative for abnormal odor  NEUROLOGIC: Negative for headaches             Physical Exam:     Vitals:    10/10/24 0904 10/10/24 0929   BP: (!) 141/89 125/74   Pulse: 70 80   Resp: 18 16   Temp: 98.2  F (36.8  C) 98.2  F  "(36.8  C)   TempSrc: Oral Oral   SpO2: 99% 100%   Weight: 63.5 kg (140 lb) 63.5 kg (140 lb)   Height: 1.6 m (5' 3\") 1.6 m (5' 3\")     Constitutional: Resting in bed, NAD  Cardiovascular: Regular rate and rhythm  Respiratory: Clear to auscultation bilaterally  Gastrointestinal: Abdomen soft, non-tender, non-distended. Suprapubic catheter site without erythema or discharge.   Extremities: Significant swelling in BLE, tender to palpation. 2+ pitting edema. Consistent with prior exams.  Lines: Port site clean, dry, no erythema. Urine light yellow, clear (previous urine light pink)         Data:     Results for orders placed or performed during the hospital encounter of 10/10/24 (from the past 24 hour(s))   CBC with platelets differential    Narrative    The following orders were created for panel order CBC with platelets differential.  Procedure                               Abnormality         Status                     ---------                               -----------         ------                     CBC with platelets and d...[[id number]]  Abnormal            Final result                 Please view results for these tests on the individual orders.   INR   Result Value Ref Range    INR 1.14 0.85 - 1.15   Comprehensive metabolic panel   Result Value Ref Range    Sodium 138 135 - 145 mmol/L    Potassium 3.9 3.4 - 5.3 mmol/L    Carbon Dioxide (CO2) 25 22 - 29 mmol/L    Anion Gap 7 7 - 15 mmol/L    Urea Nitrogen 10.0 8.0 - 23.0 mg/dL    Creatinine 0.80 0.51 - 0.95 mg/dL    GFR Estimate 78 >60 mL/min/1.73m2    Calcium 8.1 (L) 8.8 - 10.4 mg/dL    Chloride 106 98 - 107 mmol/L    Glucose 93 70 - 99 mg/dL    Alkaline Phosphatase 101 40 - 150 U/L    AST 13 0 - 45 U/L    ALT 6 0 - 50 U/L    Protein Total 5.4 (L) 6.4 - 8.3 g/dL    Albumin 2.4 (L) 3.5 - 5.2 g/dL    Bilirubin Total 0.2 <=1.2 mg/dL   CBC with platelets and differential   Result Value Ref Range    WBC Count 5.3 4.0 - 11.0 10e3/uL    RBC Count 2.67 (L) 3.80 - 5.20 " 10e6/uL    Hemoglobin 7.8 (L) 11.7 - 15.7 g/dL    Hematocrit 25.3 (L) 35.0 - 47.0 %    MCV 95 78 - 100 fL    MCH 29.2 26.5 - 33.0 pg    MCHC 30.8 (L) 31.5 - 36.5 g/dL    RDW 21.8 (H) 10.0 - 15.0 %    Platelet Count 313 150 - 450 10e3/uL    % Neutrophils 64 %    % Lymphocytes 22 %    % Monocytes 13 %    % Eosinophils 0 %    % Basophils 1 %    % Immature Granulocytes 0 %    NRBCs per 100 WBC 0 <1 /100    Absolute Neutrophils 3.4 1.6 - 8.3 10e3/uL    Absolute Lymphocytes 1.2 0.8 - 5.3 10e3/uL    Absolute Monocytes 0.7 0.0 - 1.3 10e3/uL    Absolute Eosinophils 0.0 0.0 - 0.7 10e3/uL    Absolute Basophils 0.0 0.0 - 0.2 10e3/uL    Absolute Immature Granulocytes 0.0 <=0.4 10e3/uL    Absolute NRBCs 0.0 10e3/uL   UA with Microscopic reflex to Culture    Specimen: Urine, Catheter   Result Value Ref Range    Color Urine Orange (A) Colorless, Straw, Light Yellow, Yellow    Appearance Urine Cloudy (A) Clear    Glucose Urine Negative Negative mg/dL    Bilirubin Urine Negative Negative    Ketones Urine Negative Negative mg/dL    Specific Gravity Urine 1.009 1.003 - 1.035    Blood Urine Large (A) Negative    pH Urine 6.0 5.0 - 7.0    Protein Albumin Urine 70 (A) Negative mg/dL    Urobilinogen Urine Normal Normal, 2.0 mg/dL    Nitrite Urine Negative Negative    Leukocyte Esterase Urine Large (A) Negative    Bacteria Urine Few (A) None Seen /HPF    WBC Clumps Urine Present (A) None Seen /HPF    Mucus Urine Present (A) None Seen /LPF    RBC Urine 135 (H) <=2 /HPF    WBC Urine >182 (H) <=5 /HPF    Squamous Epithelials Urine 2 (H) <=1 /HPF    Narrative    Urine Culture ordered based on laboratory criteria                 Assessment and Plan:   Assessment: 69 year old with PMH of serous endometrial carcinoma s/p SNEHA, BSO 7/2024, cystotomy s/p suprapubic catheter recently replaced in the setting of ESBL Klebsiella pneumoniae bacteremia 2/2 urinary source, now with minimal hematuria. UA appears to be similar to prior UA, and patient does  not appear overall ill-appearing. Patient is stable for discharge home.     # Hematuria  - UA consistent with prior UA. Overall not concerning with lack of systemic infectious symptoms, and non-cloudy urine.  - UCx pending, will call patient with the results and prescribe antibiotics as necessary.  - Discussed with patient that some slight hematuria like she had today is not abnormal, given her Eliquis and suprapubic catheter. Recommended coming to ED or calling us if she has red urine or systemic infectious symptoms such as fever, chills, new abdominal pain, or new nausea that does not improve with her PTA medications.   - Patient is stable to discharge from GynOnc perspective.    Code Status: Full code    Patient seen and discussed with Dr. Avina and Dr. Flavio Patel MD, MSc  Gynecologic Oncology, PGY-1  10/10/2024 3:03 PM    To reach the Gynecologic Oncology resident, please page [phone]    IAnatoly MD, saw this patient with the resident, NP, or medical student and agree with the findings and plan of care as documented in the note. I personally reviewed vital signs, medications, and labs. Any corrections were made as noted.     69 with serous uterine cancer s/p SNEHA, BSO complicated by cystotomy now with suprapubic catheter. Recent admission for klebsiella urosepsis. Presented with pink tinged urine after restarting anticoagulation. Clinically well appearing with clear urine upon our evaluation. Urine culture in process - will follow up. Okay for discharge from gyn oncology standpoint. Return precautions provided to patient and grandson who express understanding.       IAnatoly MD, saw this patient with the resident, NP, or medical student and agree with the findings and plan of care as documented in the note. I personally reviewed vital signs, medications, and labs. Any corrections were made as noted.     Anatoly Avina MD  October 13, 2024  2:20 PM      The patient was seen  and examined with the resident, the labs and vital signs were reviewed. The medical care plan outlined above was provided under my directives and direct supervision.     Yeimy Muniz MD, MPH  Gynecologic Oncology

## 2024-10-10 NOTE — ED TRIAGE NOTES
"Pt presents to the ED with kent bag showing observed pink colored output. Pt reports \"theres blood in my urine that started in the middle of the night\"/.     Triage Assessment (Adult)       Row Name 10/10/24 0906          Triage Assessment    Airway WDL WDL        Respiratory WDL    Respiratory WDL WDL        Skin Circulation/Temperature WDL    Skin Circulation/Temperature WDL WDL        Cardiac WDL    Cardiac WDL WDL        Peripheral/Neurovascular WDL    Peripheral Neurovascular WDL WDL        Cognitive/Neuro/Behavioral WDL    Cognitive/Neuro/Behavioral WDL WDL                     "

## 2024-10-10 NOTE — PROGRESS NOTES
"no acute events this shift. Pt remains vitally stable on RA. Call light within reach, calls appropriately.    /72 (BP Location: Right arm, Patient Position: Sitting)   Pulse 80   Temp 98.4  F (36.9  C) (Oral)   Resp 18   Ht 1.6 m (5' 3\")   Wt 63.5 kg (140 lb)   SpO2 99%   BMI 24.80 kg/m       Previous nurse got orders to Discharge pt.     "

## 2024-10-10 NOTE — ED PROVIDER NOTES
"ED Provider Note  [facility]      History     Chief Complaint   Patient presents with    Hematuria     Pt presents to the ED with kent bag showing observed pink colored output. Pt reports \"theres blood in my urine that started in the middle of the night\".      HPI  Alis Hartman is a 69 year old female with a history of endocervical cancer, urinary incontinence with Kent catheter, hypertension, atrial fibrillation recent hospitalization from 9/20 - 9/27 in the setting of increasing confusion, and concern for urinary tract infection found to have an MADHU, and being treated for ESBL Klebsiella pneumonia bacteremia off of antibiotics for the last 5 days who presents to the emergency department with hematuria, nausea, and increasing lower abdominal pain for the last couple of days.    No fevers or chills.  She has been feeling a little bit more unwell in general.  Does note that she has not been having the care at home from her home nurses that she thought she would be getting and they have not been able to flush her chest port for the last couple of days, and she has not had her Kent catheter cleaned properly.    No pain in her chest, shortness of breath.  No pain into her back.  No diarrhea, constipation.    Past Medical History  Past Medical History:   Diagnosis Date    Atrial fibrillation (H)     Depression     Hypertension     Malignant neoplasm of endocervix (H)     Tx with radiation    Orthopnea 9/21/2024    Other chronic pain     Low back    Schizophrenia (H)     Urinary incontinence      Past Surgical History:   Procedure Laterality Date    ABDOMINOPLASTY      BIOPSY CERVICAL, LOCAL EXCISION, SINGLE/MULTIPLE  10/26/2011    Procedure:BIOPSY CERVICAL, LOCAL EXCISION, SINGLE/MULTIPLE; EUA, cervical biopsies; Surgeon:BETTY TINEO; Location:[facility]    BIOPSY VAGINAL N/A 6/8/2021    Procedure: BIOPSY, VAGINA;  Surgeon: Dany Perez MD;  [facility]: [facility]    CYSTOSCOPY N/A " 5/17/2024    Procedure: Cystoscopy;  Surgeon: Dany Perez MD;  [facility]    CYSTOSTOMY, INSERT TUBE SUPRAPUBIC, COMBINED  7/12/2024    Procedure: Insertion of supra-pubic catheter;  Surgeon: Dany Perez MD;  [facility]    DILATION AND CURETTAGE, WITH ULTRASOUND GUIDANCE N/A 5/17/2024    Procedure: Dilation and curettage of the uterus with drainage of uterine fluid under ultrasound guidance, lysis of vaginal adhesions;  Surgeon: Dany Perez MD;  [facility]    EXAM UNDER ANESTHESIA PELVIC N/A 3/12/2020    Procedure: Exam under anesthsia, vaginal biopsies, possible CO2 laser of the vagina;  Surgeon: Lilliam Roy MD;  [facility]    GI SURGERY  2004    gastric bypass    HYSTERECTOMY TOTAL ABDOMINAL, BILATERAL SALPINGO-OOPHORECTOMY, COMBINED Bilateral 7/12/2024    Procedure: Diagnostic laparoscopy converted to exploratory laparotomy, total abdominal hysterectomy, bilateral salpingo-oophorectomy, lysis of adhesions >60 min;  Surgeon: Dany Perez MD;  [facility]    INSERT PORT VASCULAR ACCESS N/A 8/26/2024    Procedure: Insert port vascular access;  Surgeon: Avery Gregory MD;  [facility]    IR CHEST PORT PLACEMENT > 5 YRS OF AGE  8/26/2024    LASER CO2 VAGINA N/A 3/2/2015    Procedure: LASER CO2 VAGINA;  Surgeon: Mariela Abdalla MD;  [facility]    LASER CO2 VAGINA N/A 5/24/2019    Procedure: Exam under anesthesia, vaginal biopsies, CO2 laser of the vagina;  Surgeon: Lilliam Roy MD;  [facility]    LASER CO2 VAGINA N/A 6/8/2021    Procedure: Exam under anesthesia, laser ablation of the upper vagina;  Surgeon: Dany Perez MD;  [facility]    REPAIR BLADDER N/A 7/12/2024    Procedure: Repair bladder;  Surgeon: Dany Perez MD;  [facility]     acetaminophen (TYLENOL) 325 MG tablet  albuterol (PROAIR HFA/PROVENTIL HFA/VENTOLIN HFA) 108 (90 Base) MCG/ACT inhaler  apixaban ANTICOAGULANT (ELIQUIS) 2.5 MG tablet  atenolol  "(TENORMIN) 25 MG tablet  calcium Citrate-vitamin D 500-400 MG-UNIT CHEW  childrens multivitamin w/iron (FLINTSTONES COMPLETE) 60 MG chewable tablet  cholecalciferol 125 MCG (5000 UT) CAPS  cyanocobalamin (CYANOCOBALAMIN) 1000 MCG/ML injection  dexAMETHasone (DECADRON) 4 MG tablet  diclofenac (VOLTAREN) 1 % topical gel  ferrous sulfate (CASSANDRA-IN-SOL) 75 (15 FE) MG/ML oral drops  hydrocortisone 2.5 % cream  lidocaine (XYLOCAINE) 5 % external ointment  naloxone (NARCAN) 4 MG/0.1ML nasal spray  ondansetron (ZOFRAN) 8 MG tablet  oxyCODONE (ROXICODONE) 5 MG tablet  polyethylene glycol (MIRALAX) 17 GM/Dose powder  prochlorperazine (COMPAZINE) 5 MG tablet  senna-docusate (SENOKOT-S/PERICOLACE) 8.6-50 MG tablet  zolpidem (AMBIEN) 10 MG tablet      Allergies   Allergen Reactions    Ibuprofen Nausea and Vomiting    Shrimp     Sulfa Antibiotics Rash     Patient started on bactrim 7/24/24 tolerating medication without rash on 7/25      Family History  Family History   Problem Relation Age of Onset    Heart Disease Father     Heart Failure Father     No Known Problems Brother     No Known Problems Brother     No Known Problems Brother     Pancreatitis Brother     Hypertension Sister     Peripheral Vascular Disease Sister     No Known Problems Sister     No Known Problems Son     No Known Problems Daughter      Social History   Social History     Tobacco Use    Smoking status: Never    Smokeless tobacco: Never   Substance Use Topics    Alcohol use: Not Currently    Drug use: No      A medically appropriate review of systems was performed with pertinent positives and negatives noted in the HPI, and all other systems negative.    Physical Exam   BP: (!) 141/89  Pulse: 70  Temp: 98.2  F (36.8  C)  Resp: 18  Height: 160 cm (5' 3\")  Weight: 63.5 kg (140 lb)  SpO2: 99 %  Physical Exam  GEN: Well appearing, non toxic, cooperative  HEENT: normocephalic and atraumatic, PERRLA, EOMI  CV: well-perfused, normal skin color for ethnicity  PULM: " breathing comfortably, in no respiratory distress  ABD: nondistended, soft, mild tenderness in the suprapubic abdomen, otherwise nontender, no guarding, no peritonitis  Back: No CVA tenderness  : Shahid catheter bag in place with pink-tinged urine, no significant clots appreciated  EXT: Full range of motion.  No edema.  NEURO: awake, conversant, grossly normal bilateral upper and lower extremity strength & ROM   SKIN: No rashes, ecchymosis, or lacerations  PSYCH: Calm and cooperative, interactive      ED Course, Procedures, & Data      Procedures          Results for orders placed or performed during the hospital encounter of 10/10/24   UA with Microscopic reflex to Culture     Status: Abnormal    Specimen: Urine, Catheter   Result Value Ref Range    Color Urine Orange (A) Colorless, Straw, Light Yellow, Yellow    Appearance Urine Cloudy (A) Clear    Glucose Urine Negative Negative mg/dL    Bilirubin Urine Negative Negative    Ketones Urine Negative Negative mg/dL    Specific Gravity Urine 1.009 1.003 - 1.035    Blood Urine Large (A) Negative    pH Urine 6.0 5.0 - 7.0    Protein Albumin Urine 70 (A) Negative mg/dL    Urobilinogen Urine Normal Normal, 2.0 mg/dL    Nitrite Urine Negative Negative    Leukocyte Esterase Urine Large (A) Negative    Bacteria Urine Few (A) None Seen /HPF    WBC Clumps Urine Present (A) None Seen /HPF    Mucus Urine Present (A) None Seen /LPF    RBC Urine 135 (H) <=2 /HPF    WBC Urine >182 (H) <=5 /HPF    Squamous Epithelials Urine 2 (H) <=1 /HPF    Narrative    Urine Culture ordered based on laboratory criteria   INR     Status: Normal   Result Value Ref Range    INR 1.14 0.85 - 1.15   Comprehensive metabolic panel     Status: Abnormal   Result Value Ref Range    Sodium 138 135 - 145 mmol/L    Potassium 3.9 3.4 - 5.3 mmol/L    Carbon Dioxide (CO2) 25 22 - 29 mmol/L    Anion Gap 7 7 - 15 mmol/L    Urea Nitrogen 10.0 8.0 - 23.0 mg/dL    Creatinine 0.80 0.51 - 0.95 mg/dL    GFR Estimate 78  >60 mL/min/1.73m2    Calcium 8.1 (L) 8.8 - 10.4 mg/dL    Chloride 106 98 - 107 mmol/L    Glucose 93 70 - 99 mg/dL    Alkaline Phosphatase 101 40 - 150 U/L    AST 13 0 - 45 U/L    ALT 6 0 - 50 U/L    Protein Total 5.4 (L) 6.4 - 8.3 g/dL    Albumin 2.4 (L) 3.5 - 5.2 g/dL    Bilirubin Total 0.2 <=1.2 mg/dL   CBC with platelets and differential     Status: Abnormal   Result Value Ref Range    WBC Count 5.3 4.0 - 11.0 10e3/uL    RBC Count 2.67 (L) 3.80 - 5.20 10e6/uL    Hemoglobin 7.8 (L) 11.7 - 15.7 g/dL    Hematocrit 25.3 (L) 35.0 - 47.0 %    MCV 95 78 - 100 fL    MCH 29.2 26.5 - 33.0 pg    MCHC 30.8 (L) 31.5 - 36.5 g/dL    RDW 21.8 (H) 10.0 - 15.0 %    Platelet Count 313 150 - 450 10e3/uL    % Neutrophils 64 %    % Lymphocytes 22 %    % Monocytes 13 %    % Eosinophils 0 %    % Basophils 1 %    % Immature Granulocytes 0 %    NRBCs per 100 WBC 0 <1 /100    Absolute Neutrophils 3.4 1.6 - 8.3 10e3/uL    Absolute Lymphocytes 1.2 0.8 - 5.3 10e3/uL    Absolute Monocytes 0.7 0.0 - 1.3 10e3/uL    Absolute Eosinophils 0.0 0.0 - 0.7 10e3/uL    Absolute Basophils 0.0 0.0 - 0.2 10e3/uL    Absolute Immature Granulocytes 0.0 <=0.4 10e3/uL    Absolute NRBCs 0.0 10e3/uL   CBC with platelets differential     Status: Abnormal    Narrative    The following orders were created for panel order CBC with platelets differential.  Procedure                               Abnormality         Status                     ---------                               -----------         ------                     CBC with platelets and d...[[id number]]  Abnormal            Final result                 Please view results for these tests on the individual orders.     Medications   pharmacy alert - intermittent dosing (has no administration in time range)   ondansetron (ZOFRAN ODT) ODT tab 4 mg (4 mg Oral $Given 10/10/24 1012)   meropenem (MERREM) 1 g vial to attach to  mL bag (0 g Intravenous Stopped 10/10/24 6080)     Labs Ordered and Resulted from  Time of ED Arrival to Time of ED Departure   ROUTINE UA WITH MICROSCOPIC REFLEX TO CULTURE - Abnormal       Result Value    Color Urine Orange (*)     Appearance Urine Cloudy (*)     Glucose Urine Negative      Bilirubin Urine Negative      Ketones Urine Negative      Specific Gravity Urine 1.009      Blood Urine Large (*)     pH Urine 6.0      Protein Albumin Urine 70 (*)     Urobilinogen Urine Normal      Nitrite Urine Negative      Leukocyte Esterase Urine Large (*)     Bacteria Urine Few (*)     WBC Clumps Urine Present (*)     Mucus Urine Present (*)     RBC Urine 135 (*)     WBC Urine >182 (*)     Squamous Epithelials Urine 2 (*)    COMPREHENSIVE METABOLIC PANEL - Abnormal    Sodium 138      Potassium 3.9      Carbon Dioxide (CO2) 25      Anion Gap 7      Urea Nitrogen 10.0      Creatinine 0.80      GFR Estimate 78      Calcium 8.1 (*)     Chloride 106      Glucose 93      Alkaline Phosphatase 101      AST 13      ALT 6      Protein Total 5.4 (*)     Albumin 2.4 (*)     Bilirubin Total 0.2     CBC WITH PLATELETS AND DIFFERENTIAL - Abnormal    WBC Count 5.3      RBC Count 2.67 (*)     Hemoglobin 7.8 (*)     Hematocrit 25.3 (*)     MCV 95      MCH 29.2      MCHC 30.8 (*)     RDW 21.8 (*)     Platelet Count 313      % Neutrophils 64      % Lymphocytes 22      % Monocytes 13      % Eosinophils 0      % Basophils 1      % Immature Granulocytes 0      NRBCs per 100 WBC 0      Absolute Neutrophils 3.4      Absolute Lymphocytes 1.2      Absolute Monocytes 0.7      Absolute Eosinophils 0.0      Absolute Basophils 0.0      Absolute Immature Granulocytes 0.0      Absolute NRBCs 0.0     INR - Normal    INR 1.14     URINE CULTURE     No orders to display          Critical care was not performed.     Medical Decision Making  The patient's presentation was of high complexity (a chronic illness severe exacerbation, progression, or side effect of treatment).    The patient's evaluation involved:  review of external note(s)  from 3+ sources (see separate area of note for details)  review of 3+ test result(s) ordered prior to this encounter (see separate area of note for details)  ordering and/or review of 3+ test(s) in this encounter (see separate area of note for details)    The patient's management necessitated moderate risk (prescription drug management including medications given in the ED) and high risk (a decision regarding hospitalization).    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    Concern would be for either bleeding in the setting of her anticoagulant although she has not been taking it for the last day and a half due to nausea, versus UTI.  Overall abdominal exam is otherwise relatively benign.  Certainly had considered the possibility of bacteremia although no significant fevers, chills, and is otherwise relatively well-appearing.    Also noted to have her port being unable to be flushed a couple of days ago.  Will attempt to flush with TNK today    9:54 AM nursing attempted to flush her line which flushed without any difficulty.  Able to draw labs from it without using TNK    2:58 PM urinalysis difficult to interpret due to the blood in it.  There is bacteria, no nitrates, but a significant amount of white blood cells as well as red blood cells.  Could just be from rich materia, however with her history of ESBL, recent bacteremia, this does raise concern about the possibility of a urinary tract infection especially  with her lower abdominal pain, and nausea.  Initial plan was to admit her to medicine, however after speaking with medicine they had questions whether or not gynecology oncology would like to be the primary admitting her.  I did speak with them, and they will plan to evaluate her and likely will plan to bring her into their service if necessary either obs or inpatient status    4:38 PM after gyn onc evaluated the patient, they feel comfortable with the plan for her to be discharged and will plan to follow-up on her urine cultures after discharge    I have reviewed the nursing notes. I have reviewed the findings, diagnosis, plan and need for follow up with the patient.    New Prescriptions    No medications on file       Final diagnoses:   Acute cystitis with hematuria   Suprapubic catheter (H)   Nausea       Marcia Barnhart MD  [facility] EMERGENCY DEPARTMENT  10/10/2024     Marcia Barnhart MD  10/10/24 0262       Marcia Barnhart MD  10/10/24 1520

## 2024-10-10 NOTE — DISCHARGE INSTRUCTIONS
You are seen in the emergency room and due to bleeding from your bladder.  We saw evidence at this time that could be related to an infection but we do not know for sure.  you did get a dose of antibiotics here.    The gynecology/oncology service feel comfortable with you being discharged home.  They will plan to follow-up on your urine cultures, and we will call you if you need to get started on any antibiotics outside of the hospital.    Return to Emergency Department if you have any severe difficulty urinating, feels that you have full bladder but you there is nothing coming out of your Shahid bag or any other concerning symptoms.

## 2024-10-11 ENCOUNTER — PATIENT OUTREACH (OUTPATIENT)
Dept: ONCOLOGY | Facility: CLINIC | Age: 69
End: 2024-10-11
Payer: COMMERCIAL

## 2024-10-11 ENCOUNTER — PATIENT OUTREACH (OUTPATIENT)
Dept: CARE COORDINATION | Facility: CLINIC | Age: 69
End: 2024-10-11
Payer: COMMERCIAL

## 2024-10-11 NOTE — PROGRESS NOTES
[facility]: Transitions of Care Note  Chief Complaint   Patient presents with    Clinic Care Coordination - Post Hospital     Most Recent Admission Date: 10/10/2024   Most Recent Admission Diagnosis:      Most Recent Discharge Date: 10/10/2024   Most Recent Discharge Diagnosis: Suprapubic catheter (H) - Z93.59  Nausea - R11.0  Hematuria, unspecified type - R31.9  Acute cystitis with hematuria - N30.01     Transitions of Care Assessment    Upon chart review, noted patient's post-hospital telephone call was already completed today by primary care.  Patient reported she is feeling better and there are no noted concerns at this time.  Patient is scheduled to follow-up with Dr. Perez on 10/15/24.    Follow up Plan     Patient will follow up as currently scheduled.    Future Appointments   Date Time Provider Department Center   10/15/2024  8:15 AM [facility] CANCER FAST TRACK LAB [facility]   10/15/2024  9:00 AM Dany Perez MD [facility]   10/15/2024  9:30 AM [facility] BAY 1 INFUSION [facility]   11/4/2024  1:45 PM Marcia Painting, APRN CNP [facility]   11/5/2024 11:00 AM  [facility] LAB DRAW ONL [facility]   11/5/2024 11:30 AM  ONC INFUSION NURSE [facility]     Nicholas Jarrell, RN, BSN, OCN  RN Care Coordinator - Oncology  [facility]

## 2024-10-11 NOTE — PROGRESS NOTES
Clinic Care Coordination Contact  Transitions of Care Outreach    Chief Complaint   Patient presents with    Clinic Care Coordination - Post Hospital       Most Recent Admission Date: 10/10/2024   Most Recent Admission Diagnosis:      Most Recent Discharge Date: 10/10/2024   Most Recent Discharge Diagnosis: Suprapubic catheter (H) - Z93.59  Nausea - R11.0  Hematuria, unspecified type - R31.9  Acute cystitis with hematuria - N30.01     Transitions of Care Assessment    Discharge Assessment  How are you doing now that you are home?: feeling better  How are your symptoms? (Red Flag symptoms escalate to triage hotline per guidelines): Improved  Do you know how to contact your clinic care team if you have future questions or changes to your health status? : Yes  Does the patient have their discharge instructions? : Yes  Does the patient have questions regarding their discharge instructions? : No  Were you started on any new medications or were there changes to any of your previous medications? : Yes  Does the patient have all of their medications?: Yes  Do you have questions regarding any of your medications? : No  Do you have all of your needed medical supplies or equipment (DME)?  (i.e. oxygen tank, CPAP, cane, etc.): Yes    Post-op ([facility] CTA Only)  If the patient had a surgery or procedure, do they have any questions for a nurse?: No         Follow up Plan     OCT  15  Lab [place]  Tuesday Oct 15, 2024 8:00 AM  [address]  [address]  [phone]    Future Appointments   Date Time Provider Department Center   10/15/2024  8:15 AM [facility] CANCER FAST TRACK LAB [facility]   10/15/2024  9:00 AM Dany Perez MD [facility]   10/15/2024  9:30 AM [facility] BAY 1 INFUSION [facility]   11/4/2024  1:45 PM Marcia Painting APRN CNP [facility]   11/5/2024 11:00 AM [facility] LAB DRAW [facility]   11/5/2024 11:30 AM  ON[facility] IN[facility]URSE  [facility]   11/25/2024 10:45 AM Marcia Painting APRN CNP [facility]   11/26/2024 11:00 AM [facility] CANCER FAST TRACK LAB [facility]   11/26/2024 11:30 AM [facility] BAY 10 INFUSION [facility]   12/17/2024  8:30 AM [facility] CANCER FAST TRACK LAB [facility]   12/17/2024  9:00 AM Dany Perez MD [facility]   12/17/2024  9:30 AM [facility] BAY 7 INFUSION [facility]       Outpatient Plan as outlined on AVS reviewed with patient.      For any urgent concerns, please contact our 24 hour nurse triage line: [phone]     KERMIT Alejandra  [phone]  [facility]

## 2024-10-13 ENCOUNTER — NURSE TRIAGE (OUTPATIENT)
Dept: NURSING | Facility: CLINIC | Age: 69
End: 2024-10-13
Payer: COMMERCIAL

## 2024-10-13 ENCOUNTER — TELEPHONE (OUTPATIENT)
Dept: NURSING | Facility: CLINIC | Age: 69
End: 2024-10-13
Payer: COMMERCIAL

## 2024-10-13 DIAGNOSIS — N39.0 URINARY TRACT INFECTION: Primary | ICD-10-CM

## 2024-10-13 LAB
BACTERIA UR CULT: ABNORMAL

## 2024-10-13 RX ORDER — NITROFURANTOIN 25; 75 MG/1; MG/1
100 CAPSULE ORAL 2 TIMES DAILY
Qty: 14 CAPSULE | Refills: 0 | Status: SHIPPED | OUTPATIENT
Start: 2024-10-13 | End: 2024-10-20

## 2024-10-13 NOTE — TELEPHONE ENCOUNTER
[facility] ([place])    Reason for call: Lab Result Notification     Lab Result (including Rx patient on, if applicable).  If culture, copy of lab report at bottom.  Lab Result: Urine Culture - See Below    ED Rx: No antibiotic prescribed    Creatinine Level (mg/dl)   Creatinine   Date Value Ref Range Status   10/10/2024 0.80 0.51 - 0.95 mg/dL Final   05/16/2019 0.64 0.50 - 1.00 mg/dL Final    Creatinine clearance (ml/min), if applicable    Serum creatinine: 0.8 mg/dL 10/10/24 0954  Estimated creatinine clearance: 57.8 mL/min     ED Symptoms: Presented to the ED with pink urine in her kent bag, nausea, and lower abdominal pain.     Current Symptoms: Patient states that she is feeling ok. Denies any new or worsening symptoms.  Still has the catheter but denies any blood in her urine. Patient sees oncology on Tuesday 10/15/2024.     Allergic to ATBs: Yes, Sulfa  Breastfeeding: No  Pregnant: No  On Coumadin/Warfarin: No  Had any urinary procedures or have urinary   retention, neurogenic bladder, or use a catheter: Yes, indwelling kent catheter    RN Recommendations/Instructions per [facility] ED lab result protocol:   [facility] ED lab result protocol utilized: Urine Culture  Instruct to start antibiotic: Nitrofurantoin    Patient/care giver notified to contact your PCP clinic or return to the Emergency department if your:  Symptoms do not improve after 3 days on antibiotic.  Symptoms do not resolve after completing antibiotic.  Symptoms worsen or other concerning symptoms.       ROXANA GONZALEZ RN

## 2024-10-13 NOTE — TELEPHONE ENCOUNTER
Nurse Triage SBAR    Is this a 2nd Level Triage? NO    Situation: Rash on buttock    Background: Has had diarrhea once per day x 3 days. Reports pink rash around rectum. Wondering what she can use any ointments to help with the rash.     Assessment: Denies current blood in her stools but did have some earlier in the week when she was constipated. Reports some rectal itching. Denies fever, rectal pain, abdominal pain, or signs of dehydration.    Protocol Recommended Disposition:   See PCP Within 24 Hours, Home Care    Recommendation: See PCP within 24 hours. Home care advice given. Red flag symptoms reviewed with patient.       Reason for Disposition   Rash of rectal area (e.g., open sore, painful tiny water blisters, unexplained bumps)   Mild diaper rash   [1] Recent hospitalization AND [2] diarrhea present > 3 days    Additional Information   Negative: Injury to rectum   Negative: Large mass protruding out of rectum   Negative: SEVERE rectal pain (e.g., excruciating, unable to have a bowel movement)   Negative: [1] Rectal pain or redness AND [2] fever   Negative: [1] Sudden onset rectal pain AND [2] constipated (straining, rectal pressure or fullness) AND [3] NOT better after SITZ bath, suppository or enema   Negative: MODERATE-SEVERE rectal pain (i.e., interferes with school, work, or sleep)   Negative: MODERATE-SEVERE rectal itching (i.e., interferes with school, work, or sleep)   Negative: Shock suspected (e.g., cold/pale/clammy skin, too weak to stand, low BP, rapid pulse)   Negative: Difficult to awaken or acting confused (e.g., disoriented, slurred speech)   Negative: Sounds like a life-threatening emergency to the triager   Negative: [1] SEVERE abdominal pain (e.g., excruciating) AND [2] present > 1 hour   Negative: [1] SEVERE abdominal pain AND [2] age > 60 years   Negative: [1] Blood in the stool AND [2] moderate or large amount of blood   Negative: Black or tarry bowel movements  (Exception:  Chronic-unchanged black-grey BMs AND is taking iron pills or Pepto-Bismol.)   Negative: [1] Drinking very little AND [2] dehydration suspected (e.g., no urine > 12 hours, very dry mouth, very lightheaded)   Negative: Patient sounds very sick or weak to the triager   Negative: [1] SEVERE diarrhea (e.g., 7 or more times / day more than normal) AND [2] age > 60 years   Negative: [1] Constant abdominal pain AND [2] present > 2 hours   Negative: [1] Fever > 103 F (39.4 C) AND [2] not able to get the fever down using Fever Care Advice   Negative: [1] SEVERE diarrhea (e.g., 7 or more times / day more than normal) AND [2] present > 24 hours (1 day)   Negative: [1] MODERATE diarrhea (e.g., 4-6 times / day more than normal) AND [2] present > 48 hours (2 days)   Negative: [1] MODERATE diarrhea (e.g., 4-6 times / day more than normal) AND [2] age > 70 years   Negative: Fever > 101 F (38.3 C)   Negative: Fever present > 3 days (72 hours)   Negative: Abdominal pain  (Exception: Pain clears with each passage of diarrhea stool.)   Negative: [1] Blood in the stool AND [2] small amount of blood  (Exception: Only on toilet paper. Reason: Diarrhea can cause rectal irritation with blood on wiping.)   Negative: [1] Mucus or pus in stool AND [2] present > 2 days AND [3] diarrhea is more than mild   Negative: [1] Recent antibiotic therapy (i.e., within last 2 months) AND [2] diarrhea present > 3 days since antibiotic was stopped   Negative: [1] Age < 12 weeks AND [2] fever 100.4 F (38.0 C) or higher rectally   Negative: [1]  (< 1 month old) AND [2] starts to look or act abnormal in any way (e.g., decrease in activity or feeding)   Negative: [1]  (< 1 month old) AND [2] tiny water blisters or pimples (like chickenpox) in a cluster   Negative: [1] [place] (< 1 month old ) AND [2] infection suspected (open sores, yellow crusts)   Negative: Child sounds very sick or weak to the triager   Negative: [1] Spreading red area or red  streak AND [2] fever (Exception: fever and rash from diarrhea illness)   Negative: [1] Skin is bright red AND [2] peels off in sheets   Negative: Pimples, blisters, open weeping sores, pus, or yellow crusts   Negative: [1] Sore or scab on end of penis AND [2] urine comes out in dribbles   Negative: Rash is very raw or bleeds   Negative: Has spread beyond the diaper area   Negative: [1] After 3 days of treatment for yeast AND [2] rash is not improved   Negative: [1] Sore or scab on end of penis AND [2] not improved after 3 days of antibiotic ointment   Negative: [1] Mild diaper rash not improving after 3 days using standard care advice AND [2] has not tried yeast treatment   Negative: [1] Sudden onset of rash (within last 2 hours) AND [2] difficulty breathing or swallowing   Negative: Sounds like a life-threatening emergency to the triager   Negative: [1] Localized purple or blood-colored spots or dots AND [2] not from injury or friction AND [3] fever    Protocols used: Diaper Rash-P-AH, Rash or Redness - [id number]-O-OV, Rectal Symptoms-A-AH, Diarrhea-A-AH

## 2024-10-15 ENCOUNTER — INFUSION THERAPY VISIT (OUTPATIENT)
Dept: INFUSION THERAPY | Facility: CLINIC | Age: 69
End: 2024-10-15
Attending: OBSTETRICS & GYNECOLOGY
Payer: COMMERCIAL

## 2024-10-15 ENCOUNTER — ONCOLOGY VISIT (OUTPATIENT)
Dept: ONCOLOGY | Facility: CLINIC | Age: 69
End: 2024-10-15
Attending: OBSTETRICS & GYNECOLOGY
Payer: COMMERCIAL

## 2024-10-15 ENCOUNTER — TELEPHONE (OUTPATIENT)
Dept: UROLOGY | Facility: CLINIC | Age: 69
End: 2024-10-15

## 2024-10-15 VITALS
DIASTOLIC BLOOD PRESSURE: 87 MMHG | WEIGHT: 140 LBS | OXYGEN SATURATION: 100 % | HEIGHT: 63 IN | BODY MASS INDEX: 24.8 KG/M2 | HEART RATE: 95 BPM | TEMPERATURE: 98.4 F | SYSTOLIC BLOOD PRESSURE: 133 MMHG

## 2024-10-15 DIAGNOSIS — R23.8 SKIN IRRITATION: Primary | ICD-10-CM

## 2024-10-15 DIAGNOSIS — C54.9 CARCINOSARCOMA OF BODY OF UTERUS (H): Primary | ICD-10-CM

## 2024-10-15 DIAGNOSIS — C55 SEROUS ADENOCARCINOMA OF UTERUS (H): ICD-10-CM

## 2024-10-15 LAB
ALBUMIN SERPL BCG-MCNC: 2.5 G/DL (ref 3.5–5.2)
ALP SERPL-CCNC: 109 U/L (ref 40–150)
ALT SERPL W P-5'-P-CCNC: 8 U/L (ref 0–50)
ANION GAP SERPL CALCULATED.3IONS-SCNC: 9 MMOL/L (ref 7–15)
AST SERPL W P-5'-P-CCNC: 17 U/L (ref 0–45)
BASOPHILS # BLD AUTO: 0 10E3/UL (ref 0–0.2)
BASOPHILS NFR BLD AUTO: 1 %
BILIRUB SERPL-MCNC: 0.3 MG/DL
BUN SERPL-MCNC: 9.1 MG/DL (ref 8–23)
CALCIUM SERPL-MCNC: 8.5 MG/DL (ref 8.8–10.4)
CHLORIDE SERPL-SCNC: 107 MMOL/L (ref 98–107)
CREAT SERPL-MCNC: 0.78 MG/DL (ref 0.51–0.95)
EGFRCR SERPLBLD CKD-EPI 2021: 81 ML/MIN/1.73M2
EOSINOPHIL # BLD AUTO: 0 10E3/UL (ref 0–0.7)
EOSINOPHIL NFR BLD AUTO: 0 %
ERYTHROCYTE [DISTWIDTH] IN BLOOD BY AUTOMATED COUNT: 20.7 % (ref 10–15)
GLUCOSE SERPL-MCNC: 94 MG/DL (ref 70–99)
HCO3 SERPL-SCNC: 25 MMOL/L (ref 22–29)
HCT VFR BLD AUTO: 27.9 % (ref 35–47)
HGB BLD-MCNC: 8.7 G/DL (ref 11.7–15.7)
IMM GRANULOCYTES # BLD: 0 10E3/UL
IMM GRANULOCYTES NFR BLD: 1 %
LYMPHOCYTES # BLD AUTO: 1.6 10E3/UL (ref 0.8–5.3)
LYMPHOCYTES NFR BLD AUTO: 24 %
MAGNESIUM SERPL-MCNC: 1.7 MG/DL (ref 1.7–2.3)
MCH RBC QN AUTO: 28.7 PG (ref 26.5–33)
MCHC RBC AUTO-ENTMCNC: 31.2 G/DL (ref 31.5–36.5)
MCV RBC AUTO: 92 FL (ref 78–100)
MONOCYTES # BLD AUTO: 0.7 10E3/UL (ref 0–1.3)
MONOCYTES NFR BLD AUTO: 11 %
NEUTROPHILS # BLD AUTO: 4.1 10E3/UL (ref 1.6–8.3)
NEUTROPHILS NFR BLD AUTO: 64 %
NRBC # BLD AUTO: 0 10E3/UL
NRBC BLD AUTO-RTO: 0 /100
PLATELET # BLD AUTO: 332 10E3/UL (ref 150–450)
POTASSIUM SERPL-SCNC: 3.9 MMOL/L (ref 3.4–5.3)
PROT SERPL-MCNC: 5.5 G/DL (ref 6.4–8.3)
RBC # BLD AUTO: 3.03 10E6/UL (ref 3.8–5.2)
SODIUM SERPL-SCNC: 141 MMOL/L (ref 135–145)
WBC # BLD AUTO: 6.5 10E3/UL (ref 4–11)

## 2024-10-15 PROCEDURE — 250N000012 HC RX MED GY IP 250 OP 636 PS 637: Performed by: OBSTETRICS & GYNECOLOGY

## 2024-10-15 PROCEDURE — 250N000011 HC RX IP 250 OP 636: Performed by: OBSTETRICS & GYNECOLOGY

## 2024-10-15 PROCEDURE — 36591 DRAW BLOOD OFF VENOUS DEVICE: CPT | Performed by: OBSTETRICS & GYNECOLOGY

## 2024-10-15 PROCEDURE — 99215 OFFICE O/P EST HI 40 MIN: CPT | Performed by: OBSTETRICS & GYNECOLOGY

## 2024-10-15 PROCEDURE — 250N000013 HC RX MED GY IP 250 OP 250 PS 637: Performed by: OBSTETRICS & GYNECOLOGY

## 2024-10-15 PROCEDURE — 99207 PR NO CHARGE LOS: CPT

## 2024-10-15 PROCEDURE — G0463 HOSPITAL OUTPT CLINIC VISIT: HCPCS | Performed by: OBSTETRICS & GYNECOLOGY

## 2024-10-15 PROCEDURE — 83735 ASSAY OF MAGNESIUM: CPT | Performed by: OBSTETRICS & GYNECOLOGY

## 2024-10-15 PROCEDURE — 258N000003 HC RX IP 258 OP 636: Performed by: OBSTETRICS & GYNECOLOGY

## 2024-10-15 PROCEDURE — 96413 CHEMO IV INFUSION 1 HR: CPT

## 2024-10-15 PROCEDURE — 96375 TX/PRO/DX INJ NEW DRUG ADDON: CPT

## 2024-10-15 PROCEDURE — 96417 CHEMO IV INFUS EACH ADDL SEQ: CPT

## 2024-10-15 PROCEDURE — 85025 COMPLETE CBC W/AUTO DIFF WBC: CPT | Performed by: OBSTETRICS & GYNECOLOGY

## 2024-10-15 PROCEDURE — 82040 ASSAY OF SERUM ALBUMIN: CPT | Performed by: OBSTETRICS & GYNECOLOGY

## 2024-10-15 PROCEDURE — 96415 CHEMO IV INFUSION ADDL HR: CPT

## 2024-10-15 RX ORDER — DIPHENHYDRAMINE HYDROCHLORIDE 50 MG/ML
50 INJECTION INTRAMUSCULAR; INTRAVENOUS
Status: CANCELLED
Start: 2024-10-15

## 2024-10-15 RX ORDER — PALONOSETRON 0.05 MG/ML
0.25 INJECTION, SOLUTION INTRAVENOUS ONCE
Status: COMPLETED | OUTPATIENT
Start: 2024-10-15 | End: 2024-10-15

## 2024-10-15 RX ORDER — DEXAMETHASONE SODIUM PHOSPHATE 10 MG/ML
12 INJECTION, SOLUTION INTRAMUSCULAR; INTRAVENOUS ONCE
Status: CANCELLED
Start: 2024-10-15 | End: 2024-10-15

## 2024-10-15 RX ORDER — DEXAMETHASONE SODIUM PHOSPHATE 10 MG/ML
12 INJECTION, SOLUTION INTRAMUSCULAR; INTRAVENOUS ONCE
Status: DISCONTINUED | OUTPATIENT
Start: 2024-10-15 | End: 2024-10-15

## 2024-10-15 RX ORDER — MEPERIDINE HYDROCHLORIDE 25 MG/ML
25 INJECTION INTRAMUSCULAR; INTRAVENOUS; SUBCUTANEOUS EVERY 30 MIN PRN
Status: CANCELLED | OUTPATIENT
Start: 2024-10-15

## 2024-10-15 RX ORDER — ALBUTEROL SULFATE 0.83 MG/ML
2.5 SOLUTION RESPIRATORY (INHALATION)
Status: CANCELLED | OUTPATIENT
Start: 2024-10-15

## 2024-10-15 RX ORDER — DEXAMETHASONE 4 MG/1
12 TABLET ORAL ONCE
Status: COMPLETED | OUTPATIENT
Start: 2024-10-15 | End: 2024-10-15

## 2024-10-15 RX ORDER — HEPARIN SODIUM,PORCINE 10 UNIT/ML
5-20 VIAL (ML) INTRAVENOUS DAILY PRN
Status: CANCELLED | OUTPATIENT
Start: 2024-10-15

## 2024-10-15 RX ORDER — EPINEPHRINE 1 MG/ML
0.3 INJECTION, SOLUTION INTRAMUSCULAR; SUBCUTANEOUS EVERY 5 MIN PRN
Status: CANCELLED | OUTPATIENT
Start: 2024-10-15

## 2024-10-15 RX ORDER — HEPARIN SODIUM (PORCINE) LOCK FLUSH IV SOLN 100 UNIT/ML 100 UNIT/ML
5 SOLUTION INTRAVENOUS
Status: CANCELLED | OUTPATIENT
Start: 2024-10-15

## 2024-10-15 RX ORDER — HEPARIN SODIUM (PORCINE) LOCK FLUSH IV SOLN 100 UNIT/ML 100 UNIT/ML
5 SOLUTION INTRAVENOUS
Status: DISCONTINUED | OUTPATIENT
Start: 2024-10-15 | End: 2024-10-15 | Stop reason: HOSPADM

## 2024-10-15 RX ORDER — DIPHENHYDRAMINE HCL 25 MG
50 CAPSULE ORAL ONCE
Status: CANCELLED
Start: 2024-10-15

## 2024-10-15 RX ORDER — ALBUTEROL SULFATE 90 UG/1
1-2 INHALANT RESPIRATORY (INHALATION)
Status: CANCELLED
Start: 2024-10-15

## 2024-10-15 RX ORDER — DIPHENHYDRAMINE HCL 25 MG
50 CAPSULE ORAL ONCE
Status: COMPLETED | OUTPATIENT
Start: 2024-10-15 | End: 2024-10-15

## 2024-10-15 RX ORDER — TRIAMCINOLONE ACETONIDE 1 MG/G
OINTMENT TOPICAL 3 TIMES DAILY PRN
Qty: 30 G | Refills: 1 | Status: SHIPPED | OUTPATIENT
Start: 2024-10-15 | End: 2024-10-15

## 2024-10-15 RX ORDER — PALONOSETRON 0.05 MG/ML
0.25 INJECTION, SOLUTION INTRAVENOUS ONCE
Status: CANCELLED | OUTPATIENT
Start: 2024-10-15

## 2024-10-15 RX ORDER — TRIAMCINOLONE ACETONIDE 1 MG/G
OINTMENT TOPICAL 3 TIMES DAILY PRN
Qty: 30 G | Refills: 1 | Status: SHIPPED | OUTPATIENT
Start: 2024-10-15

## 2024-10-15 RX ORDER — METHYLPREDNISOLONE SODIUM SUCCINATE 125 MG/2ML
125 INJECTION INTRAMUSCULAR; INTRAVENOUS
Status: CANCELLED
Start: 2024-10-15

## 2024-10-15 RX ADMIN — APREPITANT 130 MG: 130 INJECTION, EMULSION INTRAVENOUS at 09:58

## 2024-10-15 RX ADMIN — FAMOTIDINE 20 MG: 10 INJECTION, SOLUTION INTRAVENOUS at 10:13

## 2024-10-15 RX ADMIN — CARBOPLATIN 400 MG: 10 INJECTION, SOLUTION INTRAVENOUS at 13:25

## 2024-10-15 RX ADMIN — DIPHENHYDRAMINE HYDROCHLORIDE 50 MG: 25 CAPSULE ORAL at 09:47

## 2024-10-15 RX ADMIN — PACLITAXEL 232 MG: 6 INJECTION, SOLUTION, CONCENTRATE INTRAVENOUS at 10:21

## 2024-10-15 RX ADMIN — DEXAMETHASONE 12 MG: 4 TABLET ORAL at 09:56

## 2024-10-15 RX ADMIN — HEPARIN 5 ML: 100 SYRINGE at 08:44

## 2024-10-15 RX ADMIN — PALONOSETRON HYDROCHLORIDE 0.25 MG: 0.25 INJECTION INTRAVENOUS at 10:10

## 2024-10-15 RX ADMIN — Medication 5 ML: at 14:13

## 2024-10-15 ASSESSMENT — PAIN SCALES - GENERAL: PAINLEVEL: SEVERE PAIN (7)

## 2024-10-15 NOTE — PROGRESS NOTES
Gynecologic Oncology Clinic - Established Patient Visit    Visit date: Oct 15, 2024     Reason for visit: uterine cancer, chemotherapy clearance    Interval history: Aranza is a 69 year old with a history of treatment related uterine carcinosarcoma after history of radiation therapy for cervical cancer complicated by bladder dysfunction and cystotomy with chronic supra-pubic catheter in place.    She has received 2 cycles of adjuvant carboplatin/paclitaxel. Admission for sepsis of urinary source. Completed IV antibiotics. Resumed anticoagulation. Doing well. Recent ED visit for faint hematuria. Urine culture with results below. Rx given for macrobid. She has picked this up but has not started this.     She is otherwise feeling better. She has no fevers, chills. No new pain.  No numbness or tingling in her feet but she does note some leg pain particularly in the lower legs upon further questioning this is more sensitivity to touch.    Oncology History   Serous adenocarcinoma of uterus (H)    Initial Diagnosis    Uterine serous carcinoma     5/3/2024 Imaging    CT and ultrasound showing distended endometrial canal. No evidence of metastatic disease     5/17/2024 Surgery    D&C of the uterus with drainage of the uterine fluid under US guidance, lysis of vaginal adhesions, cystoscopy     5/17/2024 Pathology    Endometrial curettings: Endometrial serous carcinoma with extensive necrosis     7/12/2024 Surgery    Diagnostic laparoscopy converted to exploratory laparotomy, total abdominal hysterectomy, bilateral salpingo-oophorectomy, lysis of adhesions, bladder repair and insertion of suprapubic catheter by Dr. Monroy     7/12/2024 Pathology    Final pathology with uterine serous carcinoma, MMR-intact/b89-ohpvrnlv/HER2 negative       7/12/2024 -  Cancer Staged    [id number] uterine serous carcinoma, omentum negative, washings negative, lymph nodes omitted due to radiation history and high grade histology     9/20/2024 -  "9/27/2024 Hospital Admission    Admission for sepsis secondary to urinary tract infection      Chemotherapy    8/8/24: Cycle 1 Carboplatin AUC 5, paclitaxel 175mg/ m2  8/30/24: Cycle 2 Carboplatin AUC 5, paclitaxel 175mg/m2  10/15/24: Cycle 3 Carboplatin AUC 5, paclitaxel 135mg/m2, reduced due to recent sepsis             Physical Exam:  /87 (BP Location: Right arm)   Pulse 95   Temp 98.4  F (36.9  C) (Oral)   Ht 1.6 m (5' 3\")   Wt 63.5 kg (140 lb)   SpO2 100%   BMI 24.80 kg/m     General appearance: no acute distress, well groomed, sitting comfortably   : Urine in catheter is yellow  LE: edema with some related skin changes but no open wounds, no discoloration    Performance status: 2 (Ambulatory and capable of all selfcare but unable to carry out any work activities; up and about more than 50% of waking hours)    Labs:  No visits with results within 4 Day(s) from this visit.   Latest known visit with results is:   Admission on 10/10/2024, Discharged on 10/10/2024   Component Date Value Ref Range Status    Color Urine 10/10/2024 Orange (A)  Colorless, Straw, Light Yellow, Yellow Final    Appearance Urine 10/10/2024 Cloudy (A)  Clear Final    Glucose Urine 10/10/2024 Negative  Negative mg/dL Final    Bilirubin Urine 10/10/2024 Negative  Negative Final    Ketones Urine 10/10/2024 Negative  Negative mg/dL Final    Specific Gravity Urine 10/10/2024 1.009  1.003 - 1.035 Final    Blood Urine 10/10/2024 Large (A)  Negative Final    pH Urine 10/10/2024 6.0  5.0 - 7.0 Final    Protein Albumin Urine 10/10/2024 70 (A)  Negative mg/dL Final    Urobilinogen Urine 10/10/2024 Normal  Normal, 2.0 mg/dL Final    Nitrite Urine 10/10/2024 Negative  Negative Final    Leukocyte Esterase Urine 10/10/2024 Large (A)  Negative Final    Bacteria Urine 10/10/2024 Few (A)  None Seen /HPF Final    WBC Clumps Urine 10/10/2024 Present (A)  None Seen /HPF Final    Mucus Urine 10/10/2024 Present (A)  None Seen /LPF Final    RBC Urine " 10/10/2024 135 (H)  <=2 /HPF Final    WBC Urine 10/10/2024 >182 (H)  <=5 /HPF Final    Squamous Epithelials Urine 10/10/2024 2 (H)  <=1 /HPF Final    INR 10/10/2024 1.14  0.85 - 1.15 Final    Sodium 10/10/2024 138  135 - 145 mmol/L Final    Potassium 10/10/2024 3.9  3.4 - 5.3 mmol/L Final    Carbon Dioxide (CO2) 10/10/2024 25  22 - 29 mmol/L Final    Anion Gap 10/10/2024 7  7 - 15 mmol/L Final    Urea Nitrogen 10/10/2024 10.0  8.0 - 23.0 mg/dL Final    Creatinine 10/10/2024 0.80  0.51 - 0.95 mg/dL Final    GFR Estimate 10/10/2024 78  >60 mL/min/1.73m2 Final    eGFR calculated using 2021 CKD-EPI equation.    Calcium 10/10/2024 8.1 (L)  8.8 - 10.4 mg/dL Final    Reference intervals for this test were updated on 7/16/2024 to reflect our healthy population more accurately. There may be differences in the flagging of prior results with similar values performed with this method. Those prior results can be interpreted in the context of the updated reference intervals.    Chloride 10/10/2024 106  98 - 107 mmol/L Final    Glucose 10/10/2024 93  70 - 99 mg/dL Final    Alkaline Phosphatase 10/10/2024 101  40 - 150 U/L Final    AST 10/10/2024 13  0 - 45 U/L Final    ALT 10/10/2024 6  0 - 50 U/L Final    Protein Total 10/10/2024 5.4 (L)  6.4 - 8.3 g/dL Final    Albumin 10/10/2024 2.4 (L)  3.5 - 5.2 g/dL Final    Bilirubin Total 10/10/2024 0.2  <=1.2 mg/dL Final    WBC Count 10/10/2024 5.3  4.0 - 11.0 10e3/uL Final    RBC Count 10/10/2024 2.67 (L)  3.80 - 5.20 10e6/uL Final    Hemoglobin 10/10/2024 7.8 (L)  11.7 - 15.7 g/dL Final    Hematocrit 10/10/2024 25.3 (L)  35.0 - 47.0 % Final    MCV 10/10/2024 95  78 - 100 fL Final    MCH 10/10/2024 29.2  26.5 - 33.0 pg Final    MCHC 10/10/2024 30.8 (L)  31.5 - 36.5 g/dL Final    RDW 10/10/2024 21.8 (H)  10.0 - 15.0 % Final    Platelet Count 10/10/2024 313  150 - 450 10e3/uL Final    % Neutrophils 10/10/2024 64  % Final    % Lymphocytes 10/10/2024 22  % Final    % Monocytes 10/10/2024 13   % Final    % Eosinophils 10/10/2024 0  % Final    % Basophils 10/10/2024 1  % Final    % Immature Granulocytes 10/10/2024 0  % Final    NRBCs per 100 WBC 10/10/2024 0  <1 /100 Final    Absolute Neutrophils 10/10/2024 3.4  1.6 - 8.3 10e3/uL Final    Absolute Lymphocytes 10/10/2024 1.2  0.8 - 5.3 10e3/uL Final    Absolute Monocytes 10/10/2024 0.7  0.0 - 1.3 10e3/uL Final    Absolute Eosinophils 10/10/2024 0.0  0.0 - 0.7 10e3/uL Final    Absolute Basophils 10/10/2024 0.0  0.0 - 0.2 10e3/uL Final    Absolute Immature Granulocytes 10/10/2024 0.0  <=0.4 10e3/uL Final    Absolute NRBCs 10/10/2024 0.0  10e3/uL Final    Culture 10/10/2024 50,000-100,000 CFU/mL Enterococcus faecium (A)   Final    Culture 10/10/2024 50,000-100,000 CFU/mL Enterococcus faecium (A)   Final    Culture 10/10/2024 <1,000 CFU/mL Urogenital elisa   Final    Culture 10/10/2024 <10,000 CFU/mL Candida albicans (A)   Final    Susceptibilities not routinely done, refer to antibiogram to view typical susceptibility profiles        Assessment:  Aranza is here for cycle 2 of carboplatin/paclitaxel for uterine carcinosarcoma complicated by recent UTI and sepsis.     Plan:  -Uterine carcinosarcoma: Proceed with cycle 3 of carboplatin and paclitaxel.  I do recommended slight dose reduction in the paclitaxel due to recent hospital admissions as well as some concerns for developing neuropathy.  If she is doing well later in her course we can consider increasing the dose back to 175 mg/m .    - Bacteriuria: take nitrofurantoin as prescribed. Call with any fevers, chills, worsening symptoms.    -Suprapubic catheter in place: Prior to her most recent cancer surgery the patient was having significant urinary dysfunction likely secondary to history of radiation therapy.  Thus at the time of hysterectomy with cystotomy decision was made to place suprapubic catheter due to her poor quality of life preoperatively.  I had multiple discussions with the patient about how this  will likely be the permanent.  If she does well from a cancer standpoint we could discuss possibility of ileal conduit in the future.    -It seems she may be due for replacement of her suprapubic catheter so we will see if urology can help with scheduling this particularly in light of recent urine culture findings.  She remains at high risk for recurrent urinary tract infections.  If she has any additional issues we should discuss with urology given her complicated history.    - Labs reviewed and we will proceed with chemotherapy as planned. Continue labs per protocol.    Dany Perez MD     Gynecologic Oncology

## 2024-10-15 NOTE — PROGRESS NOTES
Patient presents to clinic with port accessed. Dressing on port is not intact and states it was changed on 10/1/24. Patient states needle was placed by home infusion.  Port site does not have s/s of infection. Removed needle from 10/1/24.  Port site scrubbed with Chloraprep for 30 seconds. Accessed port using sterile technique. Green and purple tubes drawn. See documentation flowsheet. Port needle left accessed for treatment. Tolerated port access and draw without complaint. Lynda Zamorano RN on 10/15/2024 at 9:58 AM

## 2024-10-15 NOTE — PROGRESS NOTES
Infusion Nursing Note:  Alis Hartman presents today for C3D1 Taxol/Carbo .    Patient seen by provider today: Yes: Dr. Perez   present during visit today: Not Applicable.    Note: Pt c/o some ongoing fatigue and weakness, states after her last chemotherapy she had diarrhea that resulted in what she calls an open sore/rash.  Dr Perez aware and she ordered kenalog cream for this and will send home with pt. See flow sheet for assessment. Pt requesting her catheter collection device be changed from the hard container to a bag. Urology sent down a couple bags but no one available to come help pt switch bags. Pt is to have catheter changed by urology, last changed 9/4/24. Bags sent home with pt and pt instructed to bring new catheter bags with to her next urology appt (nothing scheduled at this point) Pt aware.      Intravenous Access:  Implanted Port.    Treatment Conditions:  Lab Results   Component Value Date    HGB 8.7 (L) 10/15/2024    WBC 6.5 10/15/2024    ANEU 2.0 01/26/2015    ANEUTAUTO 4.1 10/15/2024     10/15/2024        Lab Results   Component Value Date     10/15/2024    POTASSIUM 3.9 10/15/2024    MAG 1.7 10/15/2024    CR 0.78 10/15/2024    SAMUEL 8.5 (L) 10/15/2024    BILITOTAL 0.3 10/15/2024    ALBUMIN 2.5 (L) 10/15/2024    ALT 8 10/15/2024    AST 17 10/15/2024       Results reviewed, labs MET treatment parameters, ok to proceed with treatment.      Post Infusion Assessment:  Patient tolerated infusion without incident.  Blood return noted pre and post infusion.  Site patent and intact, free from redness, edema or discomfort.  No evidence of extravasations.  Access discontinued per protocol.       Discharge Plan:   Patient discharged in stable condition accompanied by: daughter.  Departure Mode: Wheelchair.  Pt will RTC 11/5/24 for C4D1 Taxol/Carbo. Appts verified and pt aware.      Wicho Palomino RN

## 2024-10-15 NOTE — LETTER
10/15/2024      Alis Hartman  [address]  [place] [address]      Dear Colleague,    Thank you for referring your patient, Alis Hartman, to the [facility]. Please see a copy of my visit note below.    Gynecologic Oncology Clinic - Established Patient Visit    Visit date: Oct 15, 2024     Reason for visit: uterine cancer, chemotherapy clearance    Interval history: Aranza is a 69 year old with a history of treatment related uterine carcinosarcoma after history of radiation therapy for cervical cancer complicated by bladder dysfunction and cystotomy with chronic supra-pubic catheter in place.    She has received 2 cycles of adjuvant carboplatin/paclitaxel. Admission for sepsis of urinary source. Completed IV antibiotics. Resumed anticoagulation. Doing well. Recent ED visit for faint hematuria. Urine culture with results below. Rx given for macrobid. She has picked this up but has not started this.     She is otherwise feeling better. She has no fevers, chills. No new pain.  No numbness or tingling in her feet but she does note some leg pain particularly in the lower legs upon further questioning this is more sensitivity to touch.    Oncology History   Cervical cancer (H)   3/1996 Initial Diagnosis    Cervical cancer    Moderately differentiated squamous cell carcinoma. She was treated with primary radiation therapy, unsure if she also had chemotherapy or not.  This treatment was at [facility].     12/22/2014 Procedure    Exam under anesthesia with LASER to the vagina for VAIN 3     5/24/2019 Procedure    12/27/18:  Pap:  HSIL, HPV+  5/24/19: PROCEDURES: Vaginal colposcopy, vaginal biopsy, CO2 laser of the vagina  VAGINAL BIOPSY:   - High grade squamous intraepithelial lesion (vaginal intraepithelial neoplasia 3)      3/12/2020 Procedure    10/14/19: Pap HSIL/other HPV+  2/28/2020: biopsy of vagina Vain III; MRI recommended prior to OR; however, patient did not  complete this  3/12/2020: EUA, biopsies, LASER: VAIN III     6/8/2021 Procedure    6/8/2021: Exam under anesthesia, vaginal biopsies, laser ablation of the upper vagina with Dr. Perez, biopsies returned showing necrosis, no dysplasia. Hyperbaric oxygen therapy discussed and referral place. Patient was unable to pursue this due to need for transportation to the [facility].     4/6/2023 Imaging    4/6/2023 CT Urogram: IMPRESSION:  1.  Negative CT urogram. No urinary calculi, solid renal mass, or evidence of upper tract urothelial malignancy.   2.  Enlarged uterus, with marked distention of the endometrial canal by intermediate density material, possibly a mix of fluid and blood products. This may be related to cervical stenosis given the history of prior pelvic radiation. Uterine enlargement may contribute to urinary frequency/urgency.   3.  Indeterminate left pelvic/perirectal mass with rim calcifications measuring up to 4.5 cm. Differentials include an old hematoma or an atypical enlarged lymph node. Consider pelvic MRI with contrast and subtraction imaging for further evaluation. The uterus and endometrium can also be further evaluated at that time.        Carcinosarcoma of body of uterus (H)   4/23/2024 Imaging    Pelvic US obtained due to concern for uterine distention  FINDINGS:  The uterus measures 14.4 x 7.8 x 11.8 cm. There is soft tissue  material within the uterus without blood flow. The ovaries were not visualized by pelvic ultrasound. The previously described left adnexal mass is not visualized by ultrasound. There is no simple free fluid in the pelvis.                                                                       IMPRESSION:  1. Limited evaluation of the pelvis by ultrasound. The uterus contains  both cystic and solid components and the endometrium is not defined.  Further evaluation with pelvic MRI is recommended.     5/3/2024 Imaging    CT CAP: IMPRESSION:     1. New moderate bilateral  hydroureteronephrosis, which may be  secondary to the mass effect of the adjacent enlarged uterus with  marked distention of the endometrial canal by intermediate density  material, which has increased since CT 4/6/2023. Findings may be  related to cervical stenosis secondary to prior pelvic radiation.     2. Similar left pelvic/perirectal mass with peripheral calcification.      3. Stable 4 mm left lower lobe solitary pulmonary nodule.     4. No evidence of metastatic disease.        5/17/2024 Surgery    D&C of the uterus with drainage of the uterine fluid under US guidance, lysis of vaginal adhesions, cystoscopy     5/17/2024 Pathology    Endometrial curettings: Endometrial serous carcinoma with extensive necrosis     7/12/2024 Surgery    Diagnostic laparoscopy converted to exploratory laparotomy, total abdominal hysterectomy, bilateral salpingo-oophorectomy, lysis of adhesions, repair bladder, insertion of suprapubic catheter     7/12/2024 Pathology    A. Pelvic mass, excision:  - Hyalinized and calcified fat necrosis  - Negative for malignancy     B. 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 Cervical Remnant:  - Necrotic cervical tissue, positive for serous carcinoma     D. Omentum, biopsy:  - Fibroadipose tissue with no significant histologic abnormality      8/8/2024 Initial Diagnosis    Carcinosarcoma of body of uterus (H)     8/8/2024 -  Chemotherapy    OP GYN/ONC Ovarian or Uterine Cancer - PACLitaxel / CARBOplatin  Plan Provider: Dany Perez MD  Treatment goal: Curative  Line of treatment: Adjuvant          Physical Exam:  There were no vitals taken for this visit.   General appearance: ***no acute distress, well groomed, sitting comfortably   GI: ***abdomen  soft, non-tender, non-distended  MSK: ***no lower extremity edema  Skin: ***no obvious rashes or lesions    Performance status: {ECOG PS 4-:908640}    Labs:  No visits with results within 4 Day(s) from this visit.   Latest known visit with results is:   Admission on 10/10/2024, Discharged on 10/10/2024   Component Date Value Ref Range Status     Color Urine 10/10/2024 Orange (A)  Colorless, Straw, Light Yellow, Yellow Final     Appearance Urine 10/10/2024 Cloudy (A)  Clear Final     Glucose Urine 10/10/2024 Negative  Negative mg/dL Final     Bilirubin Urine 10/10/2024 Negative  Negative Final     Ketones Urine 10/10/2024 Negative  Negative mg/dL Final     Specific Gravity Urine 10/10/2024 1.009  1.003 - 1.035 Final     Blood Urine 10/10/2024 Large (A)  Negative Final     pH Urine 10/10/2024 6.0  5.0 - 7.0 Final     Protein Albumin Urine 10/10/2024 70 (A)  Negative mg/dL Final     Urobilinogen Urine 10/10/2024 Normal  Normal, 2.0 mg/dL Final     Nitrite Urine 10/10/2024 Negative  Negative Final     Leukocyte Esterase Urine 10/10/2024 Large (A)  Negative Final     Bacteria Urine 10/10/2024 Few (A)  None Seen /HPF Final     WBC Clumps Urine 10/10/2024 Present (A)  None Seen /HPF Final     Mucus Urine 10/10/2024 Present (A)  None Seen /LPF Final     RBC Urine 10/10/2024 135 (H)  <=2 /HPF Final     WBC Urine 10/10/2024 >182 (H)  <=5 /HPF Final     Squamous Epithelials Urine 10/10/2024 2 (H)  <=1 /HPF Final     INR 10/10/2024 1.14  0.85 - 1.15 Final     Sodium 10/10/2024 138  135 - 145 mmol/L Final     Potassium 10/10/2024 3.9  3.4 - 5.3 mmol/L Final     Carbon Dioxide (CO2) 10/10/2024 25  22 - 29 mmol/L Final     Anion Gap 10/10/2024 7  7 - 15 mmol/L Final     Urea Nitrogen 10/10/2024 10.0  8.0 - 23.0 mg/dL Final     Creatinine 10/10/2024 0.80  0.51 - 0.95 mg/dL Final     GFR Estimate 10/10/2024 78  >60 mL/min/1.73m2 Final    eGFR calculated using 2021 CKD-EPI equation.     Calcium 10/10/2024 8.1 (L)  8.8 - 10.4  mg/dL Final    Reference intervals for this test were updated on 7/16/2024 to reflect our healthy population more accurately. There may be differences in the flagging of prior results with similar values performed with this method. Those prior results can be interpreted in the context of the updated reference intervals.     Chloride 10/10/2024 106  98 - 107 mmol/L Final     Glucose 10/10/2024 93  70 - 99 mg/dL Final     Alkaline Phosphatase 10/10/2024 101  40 - 150 U/L Final     AST 10/10/2024 13  0 - 45 U/L Final     ALT 10/10/2024 6  0 - 50 U/L Final     Protein Total 10/10/2024 5.4 (L)  6.4 - 8.3 g/dL Final     Albumin 10/10/2024 2.4 (L)  3.5 - 5.2 g/dL Final     Bilirubin Total 10/10/2024 0.2  <=1.2 mg/dL Final     WBC Count 10/10/2024 5.3  4.0 - 11.0 10e3/uL Final     RBC Count 10/10/2024 2.67 (L)  3.80 - 5.20 10e6/uL Final     Hemoglobin 10/10/2024 7.8 (L)  11.7 - 15.7 g/dL Final     Hematocrit 10/10/2024 25.3 (L)  35.0 - 47.0 % Final     MCV 10/10/2024 95  78 - 100 fL Final     MCH 10/10/2024 29.2  26.5 - 33.0 pg Final     MCHC 10/10/2024 30.8 (L)  31.5 - 36.5 g/dL Final     RDW 10/10/2024 21.8 (H)  10.0 - 15.0 % Final     Platelet Count 10/10/2024 313  150 - 450 10e3/uL Final     % Neutrophils 10/10/2024 64  % Final     % Lymphocytes 10/10/2024 22  % Final     % Monocytes 10/10/2024 13  % Final     % Eosinophils 10/10/2024 0  % Final     % Basophils 10/10/2024 1  % Final     % Immature Granulocytes 10/10/2024 0  % Final     NRBCs per 100 WBC 10/10/2024 0  <1 /100 Final     Absolute Neutrophils 10/10/2024 3.4  1.6 - 8.3 10e3/uL Final     Absolute Lymphocytes 10/10/2024 1.2  0.8 - 5.3 10e3/uL Final     Absolute Monocytes 10/10/2024 0.7  0.0 - 1.3 10e3/uL Final     Absolute Eosinophils 10/10/2024 0.0  0.0 - 0.7 10e3/uL Final     Absolute Basophils 10/10/2024 0.0  0.0 - 0.2 10e3/uL Final     Absolute Immature Granulocytes 10/10/2024 0.0  <=0.4 10e3/uL Final     Absolute NRBCs 10/10/2024 0.0  10e3/uL Final      Culture 10/10/2024 50,000-100,000 CFU/mL Enterococcus faecium (A)   Final     Culture 10/10/2024 50,000-100,000 CFU/mL Enterococcus faecium (A)   Final     Culture 10/10/2024 <1,000 CFU/mL Urogenital elisa   Final     Culture 10/10/2024 <10,000 CFU/mL Candida albicans (A)   Final    Susceptibilities not routinely done, refer to antibiogram to view typical susceptibility profiles        Assessment:  Aranza is here for cycle 2 of carboplatin/paclitaxel for uterine carcinosarcoma complicated by recent UTI and sepsis.     Plan:  -Uterine carcinosarcoma: Proceed with cycle 3 of carboplatin and paclitaxel.  I do recommended slight dose reduction in the paclitaxel due to recent hospital admissions as well as some concerns for developing neuropathy.  If she is doing well later in her course we can consider increasing the dose back to 175 mg/m .    - Bacteriuria: take nitrofurantoin as prescribed. Call with any fevers, chills, worsening symptoms.    -Suprapubic catheter in place: Prior to her most recent cancer surgery the patient was having significant urinary dysfunction likely secondary to history of radiation therapy.  Thus at the time of hysterectomy with cystotomy decision was made to place suprapubic catheter due to her poor quality of life preoperatively.  I had multiple discussions with the patient about how this will likely be the permanent.  If she does well from a cancer standpoint we could discuss possibility of ileal conduit in the future.    -It seems she may be due for replacement of her suprapubic catheter so we will see if urology can help with scheduling this particularly in light of recent urine culture findings.  She remains at high risk for recurrent urinary tract infections.  If she has any additional issues we should discuss with urology given her complicated history.    - Labs reviewed and we will proceed with chemotherapy as planned. Continue labs per protocol.    Dany Perez MD   Assistant  Professor  Gynecologic Oncology       Again, thank you for allowing me to participate in the care of your patient.        Sincerely,        Dany Perez MD

## 2024-10-15 NOTE — NURSING NOTE
"Oncology Rooming Note    October 15, 2024 9:07 AM   Alis Hartman is a 69 year old female who presents for:    Chief Complaint   Patient presents with    Oncology Clinic Visit     Initial Vitals: /87 (BP Location: Right arm)   Pulse 95   Temp 98.4  F (36.9  C) (Oral)   Ht 1.6 m (5' 3\")   Wt 63.5 kg (140 lb)   SpO2 100%   BMI 24.80 kg/m   Estimated body mass index is 24.8 kg/m  as calculated from the following:    Height as of this encounter: 1.6 m (5' 3\").    Weight as of this encounter: 63.5 kg (140 lb). Body surface area is 1.68 meters squared.  Severe Pain (7) Comment: Data Unavailable   No LMP recorded. Patient is postmenopausal.  Allergies reviewed: Yes  Medications reviewed: Yes    Medications: Medication refills not needed today.  Pharmacy name entered into EPIC:    [id number] - [place], [place] - [address]  [id number] [place], [place] - [address] [id number] - [place], [place] [address] [facility] [address] [place] [facility] [address] [place] [address] PHARMACY [facility] [address] [address] [place] [address], [id number]    Frailty Screening:   Is the patient here for a new oncology consult visit in cancer care? 2. No      Clinical concerns: No Concerns        Linsey Barahona MA            "

## 2024-10-16 ENCOUNTER — TELEPHONE (OUTPATIENT)
Dept: UROLOGY | Facility: CLINIC | Age: 69
End: 2024-10-16
Payer: COMMERCIAL

## 2024-10-16 PROBLEM — C55 SEROUS ADENOCARCINOMA OF UTERUS (H): Status: ACTIVE | Noted: 2024-08-08

## 2024-10-16 NOTE — TELEPHONE ENCOUNTER
"Called and spoke to patient who reports that she believes the last SPT change was completed when Dr. Monroy did it on 9/4/24. Patient stated, \"I think I am due for it to be changed.\"     Patient aware that we have to offer nurse visits when a urology provider and team are available. Offered nurse visit for SPT change on 10/24/24 at 9am and patient would like this. Nurse visit scheduled. Patient aware to come up to the 2nd floor and check in at Desk D.     Mariela Mathews RN, BSN      "

## 2024-10-16 NOTE — TELEPHONE ENCOUNTER
Barbara Soliz LPN Berkenes, Melissa, RN; Nicholas Jarrell, RN; [facility]; [facility] [place]; Sonia Campbell RN Hello Melissa    After our conversation yesterday, ok to add patient to [place]. Please contact patient to schedule.      Thank you    Barbara    Previous Messages  From: Mariela Mathews RN  Sent: 10/15/2024   9:52 AM CDT  To: Nicholas Jarrell RN; Barbara Soliz LPN; *  Subject: RE: SP catheter change, leg bag                  Good Morning,    Nicholas, thank you for reaching out. I have placed a couple of leg bags up at Desk D for the patient. Do you have staff available to come up at grab them today? If not, I can run them down to you in a bit.    I have included our MG manager and the CSC manager to review and discuss the possibility of moving this patient's nurse visits to the [place] location. Once we know more, patient will be contacted for scheduling.    Thank you,    Mariela Mathews RN, BSN  ----- Message -----  From: Nicholas Jarrell RN  Sent: 10/15/2024   9:38 AM CDT  To: Nicholas Jarrell RN; *  Subject: SP catheter change, leg bag                      Good morning,    This patient saw Dr. Monroy on 9/4 for follow-up of her suprapubic catheter.  Looks like Dr. Monroy recommended having patient schedule a nurse visit monthly for catheter changes.  Wondering if we could get that scheduled for her.  She would prefer the [place] location if possible.    Patient was also asking about the possibility of getting a leg bag to use during the day (she currently has a large overnight drainage bag).      Of note, the patient is here in our infusion center today receiving chemotherapy.  She will be here for quite awhile (her infusion takes about 5 hours or so).    Nicholas Jarrell, RN, BSN, OCN  RN Care Coordinator - Oncology  [facility]

## 2024-10-18 ENCOUNTER — TELEPHONE (OUTPATIENT)
Dept: UROLOGY | Facility: CLINIC | Age: 69
End: 2024-10-18
Payer: COMMERCIAL

## 2024-10-18 NOTE — TELEPHONE ENCOUNTER
Patient confirmed scheduled appointment:  Date: 10/28  Time: 9:00  Visit type: RTN  Provider: Nurse  Location: [facility]  Testing/imaging: NA  Additional notes: Cath change

## 2024-10-22 ENCOUNTER — PRE VISIT (OUTPATIENT)
Dept: UROLOGY | Facility: CLINIC | Age: 69
End: 2024-10-22
Payer: COMMERCIAL

## 2024-10-22 NOTE — TELEPHONE ENCOUNTER
-Dr francois --last seen 9/04/24    -possibly bactrim --cipro?     -16 fr straight tipped     -spt     -OAB/ injury of bladder, sequela

## 2024-10-24 ENCOUNTER — TELEPHONE (OUTPATIENT)
Dept: UROLOGY | Facility: CLINIC | Age: 69
End: 2024-10-24

## 2024-10-24 NOTE — TELEPHONE ENCOUNTER
"Call placed to patient for follow up as she was scheduled for a nurse visit for a catheter change today, Thursday October 24th at 0900.      Patient states that she was under the impression that a nurse was going to be going to her home for the catheter change.  This nurse apologized for any confusion, but reviewed that the nurse visit is scheduled at the clinic.  This nurse inquired if patient has care established with home care.  Patient states, \"Yeah, I talked with her the other day about this catheter change.\"  Reviewed the nurse she spoke with was one of the Urology RN Care Coordinators who reviewed the catheter change would take place in the [place] [facility] clinic.  Patient verbalized understanding and stated she understands.      She is requesting to change the appointment for the Nurse Only Catheter change to Monday, October 28th at the [place] location at 0900.      Jayde Sanchez RN on 10/24/2024 at 10:38 AM    "

## 2024-10-25 ENCOUNTER — TELEPHONE (OUTPATIENT)
Dept: CARDIOLOGY | Facility: CLINIC | Age: 69
End: 2024-10-25
Payer: COMMERCIAL

## 2024-10-25 NOTE — TELEPHONE ENCOUNTER
Patient Contacted for the patient to call back and schedule the following:    Appointment type: RTN CARDIO  Provider: ANDREA CHOUDHURY  Return date: 02/13/2025  Specialty phone number: [phone] OPT 1  Additional appointment(s) needed: N/A  Additonal Notes: N/A

## 2024-10-28 ENCOUNTER — TELEPHONE (OUTPATIENT)
Dept: UROLOGY | Facility: CLINIC | Age: 69
End: 2024-10-28

## 2024-10-28 NOTE — TELEPHONE ENCOUNTER
[facility] Call Center    Phone Message    May a detailed message be left on voicemail: yes     Reason for Call: Other: Pt is unable to make appt requesting to be seen tomorrow if possible. Please call to discuss. Thanks      Action Taken: Other: uro     Travel Screening: Not Applicable     Date of Service:

## 2024-10-28 NOTE — TELEPHONE ENCOUNTER
[facility] Call Center    Phone Message    May a detailed message be left on voicemail: yes     Reason for Call: Appointment Intake    Referring Provider Name: Pariser  Diagnosis and/or Symptoms: cath change    Pt had a 9am nurse only appt and her cab didn't show up. She says it really needs to be changed today and is hoping she can still be seen today if she find alternate transportation. She says she could be there by 10 or 11am. Please call pt asap. (No answer on priority line). Thank you!    Action Taken: Message routed to:  Adult Clinics: Urology p [id number]    Travel Screening: Not Applicable     Date of Service:

## 2024-10-28 NOTE — TELEPHONE ENCOUNTER
Called and spoke to patient. Nurse visit rescheduled to today at 11am.    Mariela Mathews RN, BSN

## 2024-10-28 NOTE — TELEPHONE ENCOUNTER
[facility] Call Center    Phone Message    May a detailed message be left on voicemail: yes     Reason for Call: Other: PT will be late for her cath change and pt is 35 minutes away. Please call pt back. Thanks     Action Taken: Other: [facility]    Travel Screening: Not Applicable     Date of Service:

## 2024-10-30 NOTE — TELEPHONE ENCOUNTER
This writer called to connect with patient as she is scheduled with a Nurse Only for her first SPT exchange.  Patient has needed to reschedule appointments multiple times due to transportation issues.  This writer connected with patient to review the need to reschedule today's appointment due to do the fact our location does not have a provider onsite today.  Patient verbalized understanding.      Patient would like for this nurse to connect with the [facility] team to see if they are able to get her in this week.  [place] next availability is November 6th (Dr. Dunne onsite).   Patient expressed she prefers not to wait that long.    Will reach out to [facility] team and review availability to bring patient onsite for exchange.      Patient verbalized understanding and agreeable to plan.  She would like to know the date and time [facility] has for availability as she needs to coordinate her ride.  She is also requesting if she can have home care services, to help cut down on rides back and forth to nurse visits.  Will communicate this to Dr. Monroy's team as well.    Will communicate above information to [facility] Urology team and [place] back with patient to make sure she is understanding of Urology plan moving forward.      Jayde Sanchez, RN on 10/30/2024 at 9:08 AM

## 2024-10-30 NOTE — TELEPHONE ENCOUNTER
Per [facility]rology team,  they provided the following availability for the initial SPT catheter exchange:  [facility] location Urology Nurse Visit options:   11/4/24 at 9 am or 330 pm  11/5/24 at 8 am     VM left for patient to call the Urology Care Team at [phone].     Will await a call back to discuss further.      Jayde Sanchez RN on 10/30/2024 at 11:25 AM

## 2024-11-01 NOTE — CONFIDENTIAL NOTE
Sonia Campbell RN Trulen, Kimberly M, RN; Barbraa Soliz, LPN22 hours ago (4:46 PM)     SR  Jose Ludwig and Marcia,    I was able to talk with the pt and offered her 11/5 at the [facility]. She said she does not want that or to come to the [facility] and wanted to keep her appt on 11/6. We are happy to have her future cath exchanges at the [facility] if you need, just let us know.    Thanks,  Sonia     Received the above message from the [facility] team. Will plan to see patient on 11/6/24 in [place] as scheduled.    Mariela Mathews RN, BSN

## 2024-11-04 ENCOUNTER — PATIENT OUTREACH (OUTPATIENT)
Dept: ONCOLOGY | Facility: CLINIC | Age: 69
End: 2024-11-04

## 2024-11-04 ENCOUNTER — VIRTUAL VISIT (OUTPATIENT)
Dept: ONCOLOGY | Facility: CLINIC | Age: 69
End: 2024-11-04
Attending: OBSTETRICS & GYNECOLOGY
Payer: COMMERCIAL

## 2024-11-04 VITALS — HEIGHT: 63 IN | WEIGHT: 140 LBS | BODY MASS INDEX: 24.8 KG/M2

## 2024-11-04 DIAGNOSIS — Z79.899 ENCOUNTER FOR LONG-TERM (CURRENT) USE OF HIGH-RISK MEDICATION: ICD-10-CM

## 2024-11-04 DIAGNOSIS — L89.159 PRESSURE INJURY OF SKIN OF SACRAL REGION, UNSPECIFIED INJURY STAGE: ICD-10-CM

## 2024-11-04 DIAGNOSIS — R82.90 CLOUDY URINE: ICD-10-CM

## 2024-11-04 DIAGNOSIS — C55 SEROUS ADENOCARCINOMA OF UTERUS (H): Primary | ICD-10-CM

## 2024-11-04 PROCEDURE — 99214 OFFICE O/P EST MOD 30 MIN: CPT | Mod: 95 | Performed by: NURSE PRACTITIONER

## 2024-11-04 PROCEDURE — G2211 COMPLEX E/M VISIT ADD ON: HCPCS | Mod: 95 | Performed by: NURSE PRACTITIONER

## 2024-11-04 RX ORDER — HEPARIN SODIUM,PORCINE 10 UNIT/ML
5-20 VIAL (ML) INTRAVENOUS DAILY PRN
OUTPATIENT
Start: 2024-11-13

## 2024-11-04 RX ORDER — MEPERIDINE HYDROCHLORIDE 25 MG/ML
25 INJECTION INTRAMUSCULAR; INTRAVENOUS; SUBCUTANEOUS
OUTPATIENT
Start: 2024-11-13

## 2024-11-04 RX ORDER — EPINEPHRINE 1 MG/ML
0.3 INJECTION, SOLUTION INTRAMUSCULAR; SUBCUTANEOUS EVERY 5 MIN PRN
OUTPATIENT
Start: 2024-11-13

## 2024-11-04 RX ORDER — PALONOSETRON 0.05 MG/ML
0.25 INJECTION, SOLUTION INTRAVENOUS ONCE
OUTPATIENT
Start: 2024-11-13

## 2024-11-04 RX ORDER — ALBUTEROL SULFATE 0.83 MG/ML
2.5 SOLUTION RESPIRATORY (INHALATION)
OUTPATIENT
Start: 2024-11-13

## 2024-11-04 RX ORDER — ALBUTEROL SULFATE 90 UG/1
1-2 INHALANT RESPIRATORY (INHALATION)
Start: 2024-11-13

## 2024-11-04 RX ORDER — DIPHENHYDRAMINE HYDROCHLORIDE 50 MG/ML
50 INJECTION INTRAMUSCULAR; INTRAVENOUS
Start: 2024-11-13

## 2024-11-04 RX ORDER — DIPHENHYDRAMINE HYDROCHLORIDE 50 MG/ML
25 INJECTION INTRAMUSCULAR; INTRAVENOUS
Start: 2024-11-13

## 2024-11-04 RX ORDER — HEPARIN SODIUM (PORCINE) LOCK FLUSH IV SOLN 100 UNIT/ML 100 UNIT/ML
5 SOLUTION INTRAVENOUS
OUTPATIENT
Start: 2024-11-13

## 2024-11-04 RX ORDER — DIPHENHYDRAMINE HCL 25 MG
50 CAPSULE ORAL ONCE
Start: 2024-11-13

## 2024-11-04 RX ORDER — METHYLPREDNISOLONE SODIUM SUCCINATE 40 MG/ML
40 INJECTION INTRAMUSCULAR; INTRAVENOUS
Start: 2024-11-13

## 2024-11-04 ASSESSMENT — PAIN SCALES - GENERAL: PAINLEVEL_OUTOF10: EXTREME PAIN (8)

## 2024-11-04 NOTE — PROGRESS NOTES
Virtual Visit Details    Type of service:  Video Visit   Joined the call at 2024, 1:45:59 pm.  Left the call at 2024, 2:05:41 pm.  You were on the call for 19 minutes 41 seconds  Originating Location (pt. Location): Home  Distant Location (provider location):  Off-site  Platform used for Video Visit: Allyssa    Gynecologic Oncology Follow Up Note    RE: Alis Hartman   : 1955  PRONOUNS: she/her/hers  SHERMAN: 2024   GYNECOLOGIC ONCOLOGIST: Dany Perez MD    CC: uterine carcinosarcoma    TREATMENT REGIMEN:  carboplatin and paclitaxel    INTERVAL HISTORY:  Aranza is a 69 year old female with a history of treatment related uterine carcinosarcoma after history of radiation therapy for cervical cancer complicated by bladder dysfunction and cystotomy with chronic suprapubic catheter in place.     She is presenting virtually for evaluation prior to cycle four of chemotherapy. Tolerated previous cycle generally well- paclitaxel had been dose reduced to 135mg/m2 given hospital admission for sepsis and concern for developing neuropathy. Side effects noted below.    Some mild aching in her lower extremities- intermittent throughout the cycle.  Some nausea a day or two after chemotherapy. Takes antiemetics with good control of symptoms. No vomiting. Eating and drinking well.  Suprapubic catheter in place. Sometimes the urine looks cloudy. No gross hematuria. Denies fevers. Has catheter exchange scheduled 24.  Intermittent bilateral lower quadrant pain, present for several months and not new or changing- feels this is stable over time. Oxycodone PRN helps, using sparingly. Constipation managed with Miralax.   Notes a sore on her coccyx- has tried Desitin without improvement. In a wheelchair most of the time.  Occasional weakness in her lower extremities- receiving home PT and home OT.   Has not taken Eliquis in a couple of days- she states she will resume this.    Eating and drinking well per her  report.    Denies fevers, chills, trouble breathing, bleeding, vomiting, diarrhea, new swelling, rashes, or paresthesias.      ONCOLOGY HISTORY:  Oncology History   Cervical cancer (H)   3/1996 Initial Diagnosis    Cervical cancer    Moderately differentiated squamous cell carcinoma. She was treated with primary radiation therapy, unsure if she also had chemotherapy or not.  This treatment was at [facility].     12/22/2014 Procedure    Exam under anesthesia with LASER to the vagina for VAIN 3     5/24/2019 Procedure    12/27/18:  Pap:  HSIL, HPV+  5/24/19: PROCEDURES: Vaginal colposcopy, vaginal biopsy, CO2 laser of the vagina  VAGINAL BIOPSY:   - High grade squamous intraepithelial lesion (vaginal intraepithelial neoplasia 3)      3/12/2020 Procedure    10/14/19: Pap HSIL/other HPV+  2/28/2020: biopsy of vagina Vain III; MRI recommended prior to OR; however, patient did not complete this  3/12/2020: EUA, biopsies, LASER: VAIN III     6/8/2021 Procedure    6/8/2021: Exam under anesthesia, vaginal biopsies, laser ablation of the upper vagina with Dr. Perez, biopsies returned showing necrosis, no dysplasia. Hyperbaric oxygen therapy discussed and referral place. Patient was unable to pursue this due to need for transportation to the [facility].     4/6/2023 Imaging    4/6/2023 CT Urogram: IMPRESSION:  1.  Negative CT urogram. No urinary calculi, solid renal mass, or evidence of upper tract urothelial malignancy.   2.  Enlarged uterus, with marked distention of the endometrial canal by intermediate density material, possibly a mix of fluid and blood products. This may be related to cervical stenosis given the history of prior pelvic radiation. Uterine enlargement may contribute to urinary frequency/urgency.   3.  Indeterminate left pelvic/perirectal mass with rim calcifications measuring up to 4.5 cm. Differentials include an old hematoma or an atypical enlarged lymph node. Consider pelvic MRI with contrast and subtraction  imaging for further evaluation. The uterus and endometrium can also be further evaluated at that time.        Serous adenocarcinoma of uterus (H)    Initial Diagnosis    Uterine serous carcinoma     5/3/2024 Imaging    CT and ultrasound showing distended endometrial canal. No evidence of metastatic disease     5/17/2024 Surgery    D&C of the uterus with drainage of the uterine fluid under US guidance, lysis of vaginal adhesions, cystoscopy     5/17/2024 Pathology    Endometrial curettings: Endometrial serous carcinoma with extensive necrosis     7/12/2024 Surgery    Diagnostic laparoscopy converted to exploratory laparotomy, total abdominal hysterectomy, bilateral salpingo-oophorectomy, lysis of adhesions, bladder repair and insertion of suprapubic catheter by Dr. Monroy     7/12/2024 Pathology    Final pathology with uterine serous carcinoma, MMR-intact/j84-fralkdeu/HER2 negative       7/12/2024 -  Cancer Staged    T2NxM0 uterine serous carcinoma, omentum negative, washings negative, lymph nodes omitted due to radiation history and high grade histology     9/20/2024 - 9/27/2024 Hospital Admission    Admission for sepsis secondary to urinary tract infection      Chemotherapy    8/8/24: Cycle 1 Carboplatin AUC 5, paclitaxel 175mg/ m2  8/30/24: Cycle 2 Carboplatin AUC 5, paclitaxel 175mg/m2  10/15/24: Cycle 3 Carboplatin AUC 5, paclitaxel 135mg/m2, reduced due to recent sepsis  11/5/24: Plan for cycle 4 Carboplatin AUC 5, paclitaxel 135mg/m2                 OBJECTIVE:    PHYSICAL EXAM:  Vital signs not obtained- virtual visit    Constitutional: Alert and oriented non-toxic appearing female in no acute distress  HEENT: No periorbital edema or perioral cyanosis  Resp: Respirations unlabored, speaking in full sentences without apparent dyspnea, wheezing, or cough  Psych: Pleasant and interactive, affect euthymic, makes appropriate eye contact, answers questions appropriately, thought content logical    DATA:    Weight  trend:  Wt Readings from Last 5 Encounters:   11/04/24 63.5 kg (140 lb)   10/15/24 63.5 kg (140 lb)   10/10/24 63.5 kg (140 lb)   09/27/24 58.8 kg (129 lb 9.6 oz)   09/09/24 68.9 kg (151 lb 12.8 oz)        Labs:  No results found for this or any previous visit (from the past 24 hours).      ASSESSMENT/PLAN:    Uterine carcinosarcoma:   Receiving treatment per Dr. Perez's plan with  carboplatin AUC 5 and paclitaxel 135mg/m2 .   No dose limiting toxicities, proceed with cycle 4 on 11/5/24 pending results of labs (CBC, CMP, and magnesium), which will be drawn and interpreted prior to infusion- reinforced that her [facility] tomorrow [facility] [place]  Plan is for 6 cycles of treatment, post-treatment follow up visit has been scheduled  Return to clinic 11/25/24 with Marcia Painting CNP for consideration of next treatment cycle (scheduled 11/26/24 at )    Treatment/disease related effects:  Suprapubic catheter in place: Replacement scheduled with urology 11/6/24. Notes cloudy urine, does have hx of bacteruria and sepsis, will recheck UA tomorrow.  Sore on her coccyx: Suspect she has a decubitis ulcer given her deconditioning, low albumin. Unable to assess virtually. Referral to WOC RN. Will send referral to [facility] to allow for ease of scheduling/transportation. Reviewed repositioning, working on increasing protein, and barrier creams.  Deconditioning: In wheelchair. Continue PT/OT.  Nausea: Mild, managed with antiemetics.    Hx atrial fibrillation on anticoagulation- reinforced importance of taking her Eliquis twice daily to prevent VTE. She verbalizes understanding of and agreement with plan, will resume Eliquis.    Reviewed alarm signs and symptoms and when to seek further care.     Patient verbalized understanding of and agreement with plan    MDM:  34 minutes spent on date of service on visit, including chart review, face to face visit, documentation, and coordination of care.    The longitudinal  plan of care for the diagnosis(es)/condition(s) as documented were addressed during this visit. Due to the added complexity in care, I will continue to support Aranza in the subsequent management and with ongoing continuity of care.    ERINN Hammond, CNP  Division of Gynecologic Oncology

## 2024-11-04 NOTE — PATIENT INSTRUCTIONS
Wound care referral- please move as much as possible, do not sit in the same position for more than an hour at a time.   You can try a barrier cream like Cavilon Durable Barrier Cream by [employer], Jerome's Butt Paste, or A&D ointment

## 2024-11-04 NOTE — PROGRESS NOTES
[facility]: Cancer Care Note                                                          Received request from provider Marcia Painting CNP, to assist with sending patient's Wound Care Referral order to [facility].  Per Marcia, patient has concern for possible pressure sore.    Patient's wound care referral, along with office visit notes from today, were faxed to the [facility] this afternoon at [phone].      Writer will follow patient's chart for scheduling updates.      Nicholas Jarrell, RN, BSN, OCN  RN Care Coordinator - Oncology  [facility]

## 2024-11-04 NOTE — LETTER
2024      Alis Hartman  [address]  [place] [address]      Dear Colleague,    Thank you for referring your patient, Alis Hartman, to the [facility]. Please see a copy of my visit note below.    Virtual Visit Details    Type of service:  Video Visit   Joined the call at 2024, 1:45:59 pm.  Left the call at 2024, 2:05:41 pm.  You were on the call for 19 minutes 41 seconds  Originating Location (pt. Location): Home  Distant Location (provider location):  Off-site  Platform used for Video Visit: [facility]    Gynecologic Oncology Follow Up Note    RE: Alis Hartman   : 1955  PRONOUNS: she/her/hers  SHERMAN: 2024   GYNECOLOGIC ONCOLOGIST: Dany Perez MD    CC: uterine carcinosarcoma    TREATMENT REGIMEN:  carboplatin and paclitaxel    INTERVAL HISTORY:  Aranza is a 69 year old female with a history of treatment related uterine carcinosarcoma after history of radiation therapy for cervical cancer complicated by bladder dysfunction and cystotomy with chronic suprapubic catheter in place.     She is presenting virtually for evaluation prior to cycle four of chemotherapy. Tolerated previous cycle generally well- paclitaxel had been dose reduced to 135mg/m2 given hospital admission for sepsis and concern for developing neuropathy. Side effects noted below.    Some mild aching in her lower extremities- intermittent throughout the cycle.  Some nausea a day or two after chemotherapy. Takes antiemetics with good control of symptoms. No vomiting. Eating and drinking well.  Suprapubic catheter in place. Sometimes the urine looks cloudy. No gross hematuria. Denies fevers. Has catheter exchange scheduled 24.  Intermittent bilateral lower quadrant pain, present for several months and not new or changing- feels this is stable over time. Oxycodone PRN helps, using sparingly. Constipation managed with Miralax.   Notes a sore on her coccyx- has  tried Desitin without improvement. In a wheelchair most of the time.  Occasional weakness in her lower extremities- receiving home PT and home OT.   Has not taken Eliquis in a couple of days- she states she will resume this.    Eating and drinking well per her report.    Denies fevers, chills, trouble breathing, bleeding, vomiting, diarrhea, new swelling, rashes, or paresthesias.      ONCOLOGY HISTORY:  Oncology History   Cervical cancer (H)   3/1996 Initial Diagnosis    Cervical cancer    Moderately differentiated squamous cell carcinoma. She was treated with primary radiation therapy, unsure if she also had chemotherapy or not.  This treatment was at [facility] [place] [place].     12/22/2014 Procedure    Exam under anesthesia with LASER to the vagina for VAIN 3     5/24/2019 Procedure    12/27/18:  Pap:  HSIL, HPV+  5/24/19: PROCEDURES: Vaginal colposcopy, vaginal biopsy, CO2 laser of the vagina  VAGINAL BIOPSY:   - High grade squamous intraepithelial lesion (vaginal intraepithelial neoplasia 3)      3/12/2020 Procedure    10/14/19: Pap HSIL/other HPV+  2/28/2020: biopsy of vagina Vain III; MRI recommended prior to OR; however, patient did not complete this  3/12/2020: EUA, biopsies, LASER: VAIN III     6/8/2021 Procedure    6/8/2021: Exam under anesthesia, vaginal biopsies, laser ablation of the upper vagina with Dr. Perez, biopsies returned showing necrosis, no dysplasia. Hyperbaric oxygen therapy discussed and referral place. Patient was unable to pursue this due to need for transportation to the [facility].     4/6/2023 Imaging    4/6/2023 CT Urogram: IMPRESSION:  1.  Negative CT urogram. No urinary calculi, solid renal mass, or evidence of upper tract urothelial malignancy.   2.  Enlarged uterus, with marked distention of the endometrial canal by intermediate density material, possibly a mix of fluid and blood products. This may be related to cervical stenosis given the history of prior pelvic radiation. Uterine enlargement may  contribute to urinary frequency/urgency.   3.  Indeterminate left pelvic/perirectal mass with rim calcifications measuring up to 4.5 cm. Differentials include an old hematoma or an atypical enlarged lymph node. Consider pelvic MRI with contrast and subtraction imaging for further evaluation. The uterus and endometrium can also be further evaluated at that time.        Serous adenocarcinoma of uterus (H)    Initial Diagnosis    Uterine serous carcinoma     5/3/2024 Imaging    CT and ultrasound showing distended endometrial canal. No evidence of metastatic disease     5/17/2024 Surgery    D&C of the uterus with drainage of the uterine fluid under US guidance, lysis of vaginal adhesions, cystoscopy     5/17/2024 Pathology    Endometrial curettings: Endometrial serous carcinoma with extensive necrosis     7/12/2024 Surgery    Diagnostic laparoscopy converted to exploratory laparotomy, total abdominal hysterectomy, bilateral salpingo-oophorectomy, lysis of adhesions, bladder repair and insertion of suprapubic catheter by Dr. Monroy     7/12/2024 Pathology    Final pathology with uterine serous carcinoma, MMR-intact/j54-czzasywr/HER2 negative       7/12/2024 -  Cancer Staged    T2NxM0 uterine serous carcinoma, omentum negative, washings negative, lymph nodes omitted due to radiation history and high grade histology     9/20/2024 - 9/27/2024 Hospital Admission    Admission for sepsis secondary to urinary tract infection      Chemotherapy    8/8/24: Cycle 1 Carboplatin AUC 5, paclitaxel 175mg/ m2  8/30/24: Cycle 2 Carboplatin AUC 5, paclitaxel 175mg/m2  10/15/24: Cycle 3 Carboplatin AUC 5, paclitaxel 135mg/m2, reduced due to recent sepsis  11/5/24: Plan for cycle 4 Carboplatin AUC 5, paclitaxel 135mg/m2                 OBJECTIVE:    PHYSICAL EXAM:  Vital signs not obtained- virtual visit    Constitutional: Alert and oriented non-toxic appearing female in no acute distress  HEENT: No periorbital edema or perioral  cyanosis  Resp: Respirations unlabored, speaking in full sentences without apparent dyspnea, wheezing, or cough  Psych: Pleasant and interactive, affect euthymic, makes appropriate eye contact, answers questions appropriately, thought content logical    DATA:    Weight trend:  Wt Readings from Last 5 Encounters:   11/04/24 63.5 kg (140 lb)   10/15/24 63.5 kg (140 lb)   10/10/24 63.5 kg (140 lb)   09/27/24 58.8 kg (129 lb 9.6 oz)   09/09/24 68.9 kg (151 lb 12.8 oz)        Labs:  No results found for this or any previous visit (from the past 24 hours).      ASSESSMENT/PLAN:    Uterine carcinosarcoma:   Receiving treatment per Dr. Perez's plan with  carboplatin AUC 5 and paclitaxel 135mg/m2 .   No dose limiting toxicities, proceed with cycle 4 on 11/5/24 pending results of labs (CBC, CMP, and magnesium), which will be drawn and interpreted prior to infusion- reinforced that her infusion tomorrow is scheduled the [facility] in [place]  Plan is for 6 cycles of treatment, post-treatment follow up visit has been scheduled  Return to clinic 11/25/24 with Marcia Painting CNP for consideration of next treatment cycle (scheduled 11/26/24 at )    Treatment/disease related effects:  Suprapubic catheter in place: Replacement scheduled with urology 11/6/24. Notes cloudy urine, does have hx of bacteruria and sepsis, will recheck UA tomorrow.  Sore on her coccyx: Suspect she has a decubitis ulcer given her deconditioning, low albumin. Unable to assess virtually. Referral to WOC RN. Will send referral to [facility] to allow for ease of scheduling/transportation. Reviewed repositioning, working on increasing protein, and barrier creams.  Deconditioning: In wheelchair. Continue PT/OT.  Nausea: Mild, managed with antiemetics.    Hx atrial fibrillation on anticoagulation- reinforced importance of taking her Eliquis twice daily to prevent VTE. She verbalizes understanding of and agreement with plan, will resume Eliquis.    Reviewed alarm  signs and symptoms and when to seek further care.     Patient verbalized understanding of and agreement with plan    MDM:  34 minutes spent on date of service on visit, including chart review, face to face visit, documentation, and coordination of care.    The longitudinal plan of care for the diagnosis(es)/condition(s) as documented were addressed during this visit. Due to the added complexity in care, I will continue to support Aranza in the subsequent management and with ongoing continuity of care.    ERINN Hammond, CNP  Division of Gynecologic Oncology        Again, thank you for allowing me to participate in the care of your patient.        Sincerely,        ERINN Hammond CNP

## 2024-11-04 NOTE — NURSING NOTE
Current patient location: [address] [place] [id number]    Is the patient currently in the state of [place]? YES    Visit mode:VIDEO    If the visit is dropped, the patient can be reconnected by: VIDEO VISIT: Text to cell phone:   Telephone Information:   Mobile [phone]       Will anyone else be joining the visit? NO  (If patient encounters technical issues they should call [phone]    Are changes needed to the allergy or medication list? Pt declined med review    Are refills needed on medications prescribed by this physician? NO    Rooming Documentation:  Unable to complete questionnaire(s) due to time    Reason for visit: EV NICHOLSF

## 2024-11-06 ENCOUNTER — PATIENT OUTREACH (OUTPATIENT)
Dept: CARE COORDINATION | Facility: CLINIC | Age: 69
End: 2024-11-06

## 2024-11-06 ENCOUNTER — PATIENT OUTREACH (OUTPATIENT)
Dept: ONCOLOGY | Facility: CLINIC | Age: 69
End: 2024-11-06

## 2024-11-06 NOTE — CONFIDENTIAL NOTE
Patient was scheduled for a nurse only visit for a SPT catheter change, per Dr. Monroy's orders.      Patent was a no show today.  This nurse called patient and was successful with connecting with her over the phone.  She states that she had a home care nurse come out to her home to change the catheter a few days ago.  She was unable to tell me if the nurse would continue to schedule these changes at her home, so this nurse connected with Dr. Monroy's team to review.    Patient expressed appreciation of call.  Reviewed if she had any further questions to reach out to the Urology team at [phone].    Jayde Sanchez RN on 11/6/2024 at 11:08 AM

## 2024-11-06 NOTE — PROGRESS NOTES
[facility]: Cancer Care Note                                                          Received notification from infusion team that patient missed her chemotherapy appointment today due to transportation issues.    Scheduling and infusion teams were able to reschedule patient's chemotherapy for tomorrow (11/7/24) at 8:30 am, [place] [facility] [place] (writer was informed that unfortunately, there is no availability in the [facility] center for this week).     Writer placed telephone call to patient this afternoon, and confirmed that the appointments for tomorrow will work for her.  Patient confirmed she will have a ride available for tomorrow's appointments, and verified with patient the appointment time, location, and address.    Also sent message to [facility] center , with request to follow-up with patient and address possible barriers with ongoing transportation issues.    Nicholas Jarrell, RN, BSN, OCN  RN Care Coordinator - Oncology  [facility]

## 2024-11-06 NOTE — PROGRESS NOTES
Social Work - Telephone/MyChart message  [facility]  Data: dx serous adenocarcinoma of uterus follows with Dr. Zamarripa.  Patient Name: Alis Hartman  Goes By: Aranza ANNEB/Age: 1955 (69 year old)     SW called Aranza to offer support/resource as has cancelled appointments/difficulty getting transportation for appointments.     Aranza has transportation covered under [employer] benefit - [facility] transportation number is [phone]. Rides have to be arranged 2 days in advance for appointments.     Intervention: Left voice message for patient on 2024 .   Plan:  will await patient's return phone call/message and provide assistance at that time.      WILLIAM Jimenez, Northern Light Maine Coast HospitalSW   Adult Oncology - [place]  [phone]  Onsite [place] on    *Please note does not work on .   Support Groups at [facility]: Social Work Services for Cancer Patients ([email or web address])

## 2024-11-07 ENCOUNTER — HOSPITAL ENCOUNTER (INPATIENT)
Facility: CLINIC | Age: 69
LOS: 2 days | Discharge: HOME | DRG: 690 | End: 2024-11-10
Attending: STUDENT IN AN ORGANIZED HEALTH CARE EDUCATION/TRAINING PROGRAM | Admitting: OBSTETRICS & GYNECOLOGY
Payer: COMMERCIAL

## 2024-11-07 ENCOUNTER — APPOINTMENT (OUTPATIENT)
Dept: GENERAL RADIOLOGY | Facility: CLINIC | Age: 69
DRG: 690 | End: 2024-11-07
Attending: STUDENT IN AN ORGANIZED HEALTH CARE EDUCATION/TRAINING PROGRAM
Payer: COMMERCIAL

## 2024-11-07 ENCOUNTER — APPOINTMENT (OUTPATIENT)
Dept: CT IMAGING | Facility: CLINIC | Age: 69
DRG: 690 | End: 2024-11-07
Attending: STUDENT IN AN ORGANIZED HEALTH CARE EDUCATION/TRAINING PROGRAM
Payer: COMMERCIAL

## 2024-11-07 DIAGNOSIS — Z90.710 S/P HYSTERECTOMY: ICD-10-CM

## 2024-11-07 DIAGNOSIS — N30.00 ACUTE CYSTITIS WITHOUT HEMATURIA: ICD-10-CM

## 2024-11-07 DIAGNOSIS — R06.01 ORTHOPNEA: ICD-10-CM

## 2024-11-07 DIAGNOSIS — R11.0 NAUSEA: ICD-10-CM

## 2024-11-07 DIAGNOSIS — E83.42 HYPOMAGNESEMIA: ICD-10-CM

## 2024-11-07 DIAGNOSIS — Z93.59 SUPRAPUBIC CATHETER (H): ICD-10-CM

## 2024-11-07 DIAGNOSIS — E87.6 HYPOKALEMIA: ICD-10-CM

## 2024-11-07 DIAGNOSIS — N30.01 ACUTE CYSTITIS WITH HEMATURIA: ICD-10-CM

## 2024-11-07 DIAGNOSIS — N39.0 URINARY TRACT INFECTION ASSOCIATED WITH CYSTOSTOMY CATHETER, INITIAL ENCOUNTER (H): ICD-10-CM

## 2024-11-07 DIAGNOSIS — R55 NEAR SYNCOPE: ICD-10-CM

## 2024-11-07 DIAGNOSIS — T83.510A URINARY TRACT INFECTION ASSOCIATED WITH CYSTOSTOMY CATHETER, INITIAL ENCOUNTER (H): ICD-10-CM

## 2024-11-07 DIAGNOSIS — R06.00 DYSPNEA: Primary | ICD-10-CM

## 2024-11-07 DIAGNOSIS — C55 SEROUS ADENOCARCINOMA OF UTERUS (H): ICD-10-CM

## 2024-11-07 LAB
ALBUMIN SERPL BCG-MCNC: 2.3 G/DL (ref 3.5–5.2)
ALBUMIN UR-MCNC: 50 MG/DL
ALP SERPL-CCNC: 121 U/L (ref 40–150)
ALT SERPL W P-5'-P-CCNC: 12 U/L (ref 0–50)
ANION GAP SERPL CALCULATED.3IONS-SCNC: 12 MMOL/L (ref 7–15)
APPEARANCE UR: ABNORMAL
AST SERPL W P-5'-P-CCNC: 21 U/L (ref 0–45)
BASOPHILS # BLD AUTO: 0 10E3/UL (ref 0–0.2)
BASOPHILS NFR BLD AUTO: 0 %
BILIRUB SERPL-MCNC: 0.5 MG/DL
BILIRUB UR QL STRIP: NEGATIVE
BUN SERPL-MCNC: 11.9 MG/DL (ref 8–23)
CALCIUM SERPL-MCNC: 7.6 MG/DL (ref 8.8–10.4)
CHLORIDE SERPL-SCNC: 96 MMOL/L (ref 98–107)
COLOR UR AUTO: ABNORMAL
CREAT SERPL-MCNC: 0.96 MG/DL (ref 0.51–0.95)
EGFRCR SERPLBLD CKD-EPI 2021: 63 ML/MIN/1.73M2
EOSINOPHIL # BLD AUTO: 0 10E3/UL (ref 0–0.7)
EOSINOPHIL NFR BLD AUTO: 0 %
ERYTHROCYTE [DISTWIDTH] IN BLOOD BY AUTOMATED COUNT: 18.4 % (ref 10–15)
GLUCOSE SERPL-MCNC: 109 MG/DL (ref 70–99)
GLUCOSE UR STRIP-MCNC: NEGATIVE MG/DL
HCO3 SERPL-SCNC: 27 MMOL/L (ref 22–29)
HCT VFR BLD AUTO: 27.9 % (ref 35–47)
HGB BLD-MCNC: 8.9 G/DL (ref 11.7–15.7)
HGB UR QL STRIP: ABNORMAL
IMM GRANULOCYTES # BLD: 0 10E3/UL
IMM GRANULOCYTES NFR BLD: 1 %
INR PPP: 1.21 (ref 0.85–1.15)
KETONES UR STRIP-MCNC: NEGATIVE MG/DL
LACTATE SERPL-SCNC: 1.4 MMOL/L (ref 0.7–2)
LACTATE SERPL-SCNC: 2.2 MMOL/L (ref 0.7–2)
LACTATE SERPL-SCNC: 2.7 MMOL/L (ref 0.7–2)
LEUKOCYTE ESTERASE UR QL STRIP: ABNORMAL
LYMPHOCYTES # BLD AUTO: 1.1 10E3/UL (ref 0.8–5.3)
LYMPHOCYTES NFR BLD AUTO: 14 %
MAGNESIUM SERPL-MCNC: 1.3 MG/DL (ref 1.7–2.3)
MCH RBC QN AUTO: 30 PG (ref 26.5–33)
MCHC RBC AUTO-ENTMCNC: 31.9 G/DL (ref 31.5–36.5)
MCV RBC AUTO: 94 FL (ref 78–100)
MONOCYTES # BLD AUTO: 0.8 10E3/UL (ref 0–1.3)
MONOCYTES NFR BLD AUTO: 10 %
MUCOUS THREADS #/AREA URNS LPF: PRESENT /LPF
NEUTROPHILS # BLD AUTO: 6.1 10E3/UL (ref 1.6–8.3)
NEUTROPHILS NFR BLD AUTO: 76 %
NITRATE UR QL: POSITIVE
NRBC # BLD AUTO: 0 10E3/UL
NRBC BLD AUTO-RTO: 0 /100
PH UR STRIP: 6.5 [PH] (ref 5–7)
PLATELET # BLD AUTO: 184 10E3/UL (ref 150–450)
POTASSIUM SERPL-SCNC: 2.7 MMOL/L (ref 3.4–5.3)
PROT SERPL-MCNC: 4.9 G/DL (ref 6.4–8.3)
RBC # BLD AUTO: 2.97 10E6/UL (ref 3.8–5.2)
RBC URINE: 5 /HPF
SODIUM SERPL-SCNC: 135 MMOL/L (ref 135–145)
SP GR UR STRIP: 1 (ref 1–1.03)
SQUAMOUS EPITHELIAL: 2 /HPF
TRANSITIONAL EPI: 3 /HPF
TROPONIN T SERPL HS-MCNC: 22 NG/L
TROPONIN T SERPL HS-MCNC: 25 NG/L
TROPONIN T SERPL HS-MCNC: 26 NG/L
UROBILINOGEN UR STRIP-MCNC: NORMAL MG/DL
WBC # BLD AUTO: 8 10E3/UL (ref 4–11)
WBC CLUMPS #/AREA URNS HPF: PRESENT /HPF
WBC URINE: >182 /HPF
YEAST #/AREA URNS HPF: ABNORMAL /HPF

## 2024-11-07 PROCEDURE — 36415 COLL VENOUS BLD VENIPUNCTURE: CPT | Performed by: STUDENT IN AN ORGANIZED HEALTH CARE EDUCATION/TRAINING PROGRAM

## 2024-11-07 PROCEDURE — 74177 CT ABD & PELVIS W/CONTRAST: CPT | Mod: 26 | Performed by: STUDENT IN AN ORGANIZED HEALTH CARE EDUCATION/TRAINING PROGRAM

## 2024-11-07 PROCEDURE — 81001 URINALYSIS AUTO W/SCOPE: CPT | Performed by: STUDENT IN AN ORGANIZED HEALTH CARE EDUCATION/TRAINING PROGRAM

## 2024-11-07 PROCEDURE — 93005 ELECTROCARDIOGRAM TRACING: CPT | Performed by: STUDENT IN AN ORGANIZED HEALTH CARE EDUCATION/TRAINING PROGRAM

## 2024-11-07 PROCEDURE — 83605 ASSAY OF LACTIC ACID: CPT | Performed by: STUDENT IN AN ORGANIZED HEALTH CARE EDUCATION/TRAINING PROGRAM

## 2024-11-07 PROCEDURE — 80053 COMPREHEN METABOLIC PANEL: CPT | Performed by: STUDENT IN AN ORGANIZED HEALTH CARE EDUCATION/TRAINING PROGRAM

## 2024-11-07 PROCEDURE — 250N000011 HC RX IP 250 OP 636: Performed by: STUDENT IN AN ORGANIZED HEALTH CARE EDUCATION/TRAINING PROGRAM

## 2024-11-07 PROCEDURE — 71046 X-RAY EXAM CHEST 2 VIEWS: CPT | Mod: 26 | Performed by: RADIOLOGY

## 2024-11-07 PROCEDURE — 87040 BLOOD CULTURE FOR BACTERIA: CPT | Performed by: STUDENT IN AN ORGANIZED HEALTH CARE EDUCATION/TRAINING PROGRAM

## 2024-11-07 PROCEDURE — 87086 URINE CULTURE/COLONY COUNT: CPT | Performed by: STUDENT IN AN ORGANIZED HEALTH CARE EDUCATION/TRAINING PROGRAM

## 2024-11-07 PROCEDURE — 85610 PROTHROMBIN TIME: CPT | Performed by: STUDENT IN AN ORGANIZED HEALTH CARE EDUCATION/TRAINING PROGRAM

## 2024-11-07 PROCEDURE — 71046 X-RAY EXAM CHEST 2 VIEWS: CPT

## 2024-11-07 PROCEDURE — 99285 EMERGENCY DEPT VISIT HI MDM: CPT | Performed by: STUDENT IN AN ORGANIZED HEALTH CARE EDUCATION/TRAINING PROGRAM

## 2024-11-07 PROCEDURE — 85025 COMPLETE CBC W/AUTO DIFF WBC: CPT | Performed by: STUDENT IN AN ORGANIZED HEALTH CARE EDUCATION/TRAINING PROGRAM

## 2024-11-07 PROCEDURE — 74177 CT ABD & PELVIS W/CONTRAST: CPT

## 2024-11-07 PROCEDURE — 258N000003 HC RX IP 258 OP 636: Performed by: STUDENT IN AN ORGANIZED HEALTH CARE EDUCATION/TRAINING PROGRAM

## 2024-11-07 PROCEDURE — 250N000011 HC RX IP 250 OP 636: Performed by: NURSE PRACTITIONER

## 2024-11-07 PROCEDURE — 250N000013 HC RX MED GY IP 250 OP 250 PS 637: Performed by: STUDENT IN AN ORGANIZED HEALTH CARE EDUCATION/TRAINING PROGRAM

## 2024-11-07 PROCEDURE — 99285 EMERGENCY DEPT VISIT HI MDM: CPT | Mod: 25 | Performed by: STUDENT IN AN ORGANIZED HEALTH CARE EDUCATION/TRAINING PROGRAM

## 2024-11-07 PROCEDURE — 84484 ASSAY OF TROPONIN QUANT: CPT | Performed by: STUDENT IN AN ORGANIZED HEALTH CARE EDUCATION/TRAINING PROGRAM

## 2024-11-07 PROCEDURE — 250N000013 HC RX MED GY IP 250 OP 250 PS 637: Performed by: NURSE PRACTITIONER

## 2024-11-07 PROCEDURE — 93010 ELECTROCARDIOGRAM REPORT: CPT | Performed by: STUDENT IN AN ORGANIZED HEALTH CARE EDUCATION/TRAINING PROGRAM

## 2024-11-07 PROCEDURE — 84484 ASSAY OF TROPONIN QUANT: CPT

## 2024-11-07 PROCEDURE — 83735 ASSAY OF MAGNESIUM: CPT | Performed by: STUDENT IN AN ORGANIZED HEALTH CARE EDUCATION/TRAINING PROGRAM

## 2024-11-07 RX ORDER — OXYCODONE HYDROCHLORIDE 5 MG/1
5-10 TABLET ORAL EVERY 4 HOURS PRN
Status: DISCONTINUED | OUTPATIENT
Start: 2024-11-07 | End: 2024-11-10 | Stop reason: HOSPADM

## 2024-11-07 RX ORDER — POTASSIUM CHLORIDE 20MEQ/15ML
20 LIQUID (ML) ORAL ONCE
Status: COMPLETED | OUTPATIENT
Start: 2024-11-07 | End: 2024-11-07

## 2024-11-07 RX ORDER — MORPHINE SULFATE 4 MG/ML
4 INJECTION, SOLUTION INTRAMUSCULAR; INTRAVENOUS
Status: DISCONTINUED | OUTPATIENT
Start: 2024-11-07 | End: 2024-11-07

## 2024-11-07 RX ORDER — POLYETHYLENE GLYCOL 3350 17 G/17G
17 POWDER, FOR SOLUTION ORAL DAILY PRN
Status: DISCONTINUED | OUTPATIENT
Start: 2024-11-07 | End: 2024-11-10 | Stop reason: HOSPADM

## 2024-11-07 RX ORDER — ONDANSETRON 8 MG/1
8 TABLET, FILM COATED ORAL EVERY 8 HOURS PRN
Status: DISCONTINUED | OUTPATIENT
Start: 2024-11-07 | End: 2024-11-10 | Stop reason: HOSPADM

## 2024-11-07 RX ORDER — VANCOMYCIN HYDROCHLORIDE
1250 EVERY 24 HOURS
Status: DISCONTINUED | OUTPATIENT
Start: 2024-11-07 | End: 2024-11-07

## 2024-11-07 RX ORDER — AMOXICILLIN 250 MG
1 CAPSULE ORAL 2 TIMES DAILY PRN
Status: DISCONTINUED | OUTPATIENT
Start: 2024-11-07 | End: 2024-11-10 | Stop reason: HOSPADM

## 2024-11-07 RX ORDER — IOPAMIDOL 755 MG/ML
86 INJECTION, SOLUTION INTRAVASCULAR ONCE
Status: COMPLETED | OUTPATIENT
Start: 2024-11-07 | End: 2024-11-07

## 2024-11-07 RX ORDER — LIDOCAINE 40 MG/G
CREAM TOPICAL
Status: DISCONTINUED | OUTPATIENT
Start: 2024-11-07 | End: 2024-11-10 | Stop reason: HOSPADM

## 2024-11-07 RX ORDER — ATENOLOL 25 MG/1
12.5 TABLET ORAL DAILY
Status: DISCONTINUED | OUTPATIENT
Start: 2024-11-08 | End: 2024-11-10 | Stop reason: HOSPADM

## 2024-11-07 RX ORDER — POTASSIUM CHLORIDE 7.45 MG/ML
10 INJECTION INTRAVENOUS ONCE
Status: COMPLETED | OUTPATIENT
Start: 2024-11-07 | End: 2024-11-07

## 2024-11-07 RX ORDER — ACETAMINOPHEN 325 MG/1
650 TABLET ORAL EVERY 6 HOURS PRN
Status: DISCONTINUED | OUTPATIENT
Start: 2024-11-07 | End: 2024-11-10 | Stop reason: HOSPADM

## 2024-11-07 RX ORDER — MAGNESIUM SULFATE HEPTAHYDRATE 40 MG/ML
4 INJECTION, SOLUTION INTRAVENOUS ONCE
Status: COMPLETED | OUTPATIENT
Start: 2024-11-07 | End: 2024-11-07

## 2024-11-07 RX ORDER — ALBUTEROL SULFATE 90 UG/1
1-2 INHALANT RESPIRATORY (INHALATION) EVERY 4 HOURS PRN
Status: DISCONTINUED | OUTPATIENT
Start: 2024-11-07 | End: 2024-11-10 | Stop reason: HOSPADM

## 2024-11-07 RX ORDER — FERROUS SULFATE 220 (44)/5
15 ELIXIR ORAL DAILY
Status: DISCONTINUED | OUTPATIENT
Start: 2024-11-08 | End: 2024-11-10 | Stop reason: HOSPADM

## 2024-11-07 RX ORDER — PROCHLORPERAZINE MALEATE 5 MG/1
5-10 TABLET ORAL EVERY 6 HOURS PRN
Status: DISCONTINUED | OUTPATIENT
Start: 2024-11-07 | End: 2024-11-10 | Stop reason: HOSPADM

## 2024-11-07 RX ADMIN — APIXABAN 2.5 MG: 2.5 TABLET, FILM COATED ORAL at 19:36

## 2024-11-07 RX ADMIN — OXYCODONE HYDROCHLORIDE 10 MG: 5 TABLET ORAL at 19:36

## 2024-11-07 RX ADMIN — SODIUM CHLORIDE, POTASSIUM CHLORIDE, SODIUM LACTATE AND CALCIUM CHLORIDE 1000 ML: 600; 310; 30; 20 INJECTION, SOLUTION INTRAVENOUS at 10:35

## 2024-11-07 RX ADMIN — Medication 1250 MG: at 14:45

## 2024-11-07 RX ADMIN — IOPAMIDOL 86 ML: 755 INJECTION, SOLUTION INTRAVENOUS at 11:51

## 2024-11-07 RX ADMIN — MORPHINE SULFATE 4 MG: 4 INJECTION INTRAVENOUS at 12:23

## 2024-11-07 RX ADMIN — MAGNESIUM SULFATE IN WATER 4 G: 40 INJECTION, SOLUTION INTRAVENOUS at 13:10

## 2024-11-07 RX ADMIN — POTASSIUM CHLORIDE 10 MEQ: 7.46 INJECTION, SOLUTION INTRAVENOUS at 12:08

## 2024-11-07 RX ADMIN — SODIUM CHLORIDE, POTASSIUM CHLORIDE, SODIUM LACTATE AND CALCIUM CHLORIDE 500 ML: 600; 310; 30; 20 INJECTION, SOLUTION INTRAVENOUS at 16:54

## 2024-11-07 RX ADMIN — POTASSIUM CHLORIDE 20 MEQ: 20 SOLUTION ORAL at 11:22

## 2024-11-07 RX ADMIN — PIPERACILLIN SODIUM AND TAZOBACTAM SODIUM 3.38 G: 3; .375 INJECTION, SOLUTION INTRAVENOUS at 19:36

## 2024-11-07 RX ADMIN — PIPERACILLIN SODIUM AND TAZOBACTAM SODIUM 3.38 G: 3; .375 INJECTION, SOLUTION INTRAVENOUS at 13:29

## 2024-11-07 ASSESSMENT — ACTIVITIES OF DAILY LIVING (ADL)
ADLS_ACUITY_SCORE: 0

## 2024-11-07 ASSESSMENT — COLUMBIA-SUICIDE SEVERITY RATING SCALE - C-SSRS
1. IN THE PAST MONTH, HAVE YOU WISHED YOU WERE DEAD OR WISHED YOU COULD GO TO SLEEP AND NOT WAKE UP?: NO
6. HAVE YOU EVER DONE ANYTHING, STARTED TO DO ANYTHING, OR PREPARED TO DO ANYTHING TO END YOUR LIFE?: NO
2. HAVE YOU ACTUALLY HAD ANY THOUGHTS OF KILLING YOURSELF IN THE PAST MONTH?: NO

## 2024-11-07 NOTE — PHARMACY-VANCOMYCIN DOSING SERVICE
"Pharmacy Vancomycin Initial Note  Date of Service 2024  Patient's  1955  69 year old, female    Indication: Urinary Tract Infection    Current estimated CrCl = Estimated Creatinine Clearance: 48.2 mL/min (A) (based on SCr of 0.96 mg/dL (H)).    Creatinine for last 3 days  2024: 10:05 AM Creatinine 0.96 mg/dL    Recent Vancomycin Level(s) for last 3 days  No results found for requested labs within last 3 days.      Vancomycin IV Administrations (past 72 hours)        No vancomycin orders with administrations in past 72 hours.                    Nephrotoxins and other renal medications (From now, onward)      Start     Dose/Rate Route Frequency Ordered Stop    24 1400  vancomycin (VANCOCIN) 1,250 mg in 0.9% NaCl 250 mL intermittent infusion         1,250 mg  166.7 mL/hr over 90 Minutes Intravenous EVERY 24 HOURS 24 1256      24 1255  piperacillin-tazobactam (ZOSYN) intermittent infusion 3.375 g        Note to Pharmacy: For [facility], [facility] and [facility]: For Zosyn-naive patients, use the \"Zosyn initial dose + extended infusion\" order panel.    3.375 g  100 mL/hr over 30 Minutes Intravenous EVERY 6 HOURS 24 1254              Contrast Orders - past 72 hours (72h ago, onward)      Start     Dose/Rate Route Frequency Stop    24 1140  iopamidol (ISOVUE-370) solution 86 mL         86 mL Intravenous ONCE 24 1151            [employer] Prediction of Planned Initial Vancomycin Regimen  Loading dose: N/A  Regimen: 1250 mg IV every 24 hours.  Start time: 12:55 on 2024  Exposure target: AUC24 (range)400-600 mg/L.hr   AUC24,ss: 480 mg/L.hr  Probability of AUC24 > 400: 72 %  Ctrough,ss: 13.9 mg/L  Probability of Ctrough,ss > 20: 16 %  Probability of nephrotoxicity (Lodise HIEU ): 9 %          Plan:  Start vancomycin  1250 mg IV q24h.   Vancomycin monitoring method: AUC  Vancomycin therapeutic monitoring goal: 400-600 mg*h/L  Pharmacy will check vancomycin levels as appropriate in " 3-5 Days.    Serum creatinine levels will be ordered daily for the first week of therapy and at least twice weekly for subsequent weeks.      Melissa Blue, PharmD, BCEMP, BCPS

## 2024-11-07 NOTE — PROGRESS NOTES
[facility]: Cancer Care Note                                                          Noted patient's fax to the [facility] unfortunately failed x 2 attempts.      Writer placed telephone call to [facility] this morning, confirmed we used the correct fax number ([phone]).  Informed them that their fax doesn't seem to be working.      Writer was provided with an alternate fax number to try ([phone]).  Patient's referral was re-faxed to [facility] this morning, using the new fax number.      Nicholas Jarrell, RN, BSN, OCN  RN Care Coordinator - Oncology  [facility]

## 2024-11-07 NOTE — ED PROVIDER NOTES
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    Pt is accompanied by her grandson who gives hx along with pt. They note she has been having more cloudy/pink urine in her catheter for the last few days and are worried it might be infected. Today, she stood up and immediately afterwards felt very dizzy, weak, and short of breath. This lasted for a few minutes and she was unable to get herself out of the car on her own. However, it has since resolved. She denies any chest pain, cough, fevers, chills.     Does endorse persistent abdominal pain since surgery and is unclear whether the pain has worsened, but does endorse ongoing nausea, and occasional vomiting. No new weakness, numbness, or tingling.    Past Medical History  Past Medical History:   Diagnosis Date    Atrial fibrillation (H)     Depression     Hypertension     Malignant neoplasm of endocervix (H)     Tx with radiation    Near syncope 11/7/2024    Orthopnea 9/21/2024    Other chronic pain     Low back    Schizophrenia (H)     Urinary incontinence      Past Surgical History:   Procedure Laterality Date    ABDOMINOPLASTY      BIOPSY CERVICAL, LOCAL EXCISION, SINGLE/MULTIPLE  10/26/2011    Procedure:BIOPSY CERVICAL, LOCAL EXCISION, SINGLE/MULTIPLE; EUA, cervical biopsies; Surgeon:BETTY TINEO; Location:[facility] OR    BIOPSY VAGINAL N/A 6/8/2021    Procedure: BIOPSY, VAGINA;  Surgeon: Dany Perez MD;  Location: [facility] OR    CYSTOSCOPY N/A 5/17/2024    Procedure: Cystoscopy;  Surgeon: Ana  MD Dany;  [facility]: [facility]    CYSTOSTOMY, INSERT TUBE SUPRAPUBIC, COMBINED  7/12/2024    Procedure: Insertion of supra-pubic catheter;  Surgeon: Dany Perez MD;  [facility]    DILATION AND CURETTAGE, WITH ULTRASOUND GUIDANCE N/A 5/17/2024    Procedure: Dilation and curettage of the uterus with drainage of uterine fluid under ultrasound guidance, lysis of vaginal adhesions;  Surgeon: Dany Perez MD;  [facility]: [facility]    EXAM UNDER ANESTHESIA PELVIC N/A 3/12/2020    Procedure: Exam under anesthsia, vaginal biopsies, possible CO2 laser of the vagina;  Surgeon: Lilliam Roy MD;  [facility]    GI SURGERY  2004    gastric bypass    HYSTERECTOMY TOTAL ABDOMINAL, BILATERAL SALPINGO-OOPHORECTOMY, COMBINED Bilateral 7/12/2024    Procedure: Diagnostic laparoscopy converted to exploratory laparotomy, total abdominal hysterectomy, bilateral salpingo-oophorectomy, lysis of adhesions >60 min;  Surgeon: Dany Perez MD;  [facility]    INSERT PORT VASCULAR ACCESS N/A 8/26/2024    Procedure: Insert port vascular access;  Surgeon: Avery Gregory MD;  [facility]: [facility]    IR CHEST PORT PLACEMENT > 5 YRS OF AGE  8/26/2024    LASER CO2 VAGINA N/A 3/2/2015    Procedure: LASER CO2 VAGINA;  Surgeon: Mariela Abdalla MD;  Location: [facility]    LASER CO2 VAGINA N/A 5/24/2019    Procedure: Exam under anesthesia, vaginal biopsies, CO2 laser of the vagina;  Surgeon: Lilliam Roy MD;  [facility]: [facility]    LASER CO2 VAGINA N/A 6/8/2021    Procedure: Exam under anesthesia, laser ablation of the upper vagina;  Surgeon: Dany Perez MD;  Location: [facility]    REPAIR BLADDER N/A 7/12/2024    Procedure: Repair bladder;  Surgeon: Dany Perez MD;  [facility]     acetaminophen (TYLENOL) 325 MG tablet  albuterol (PROAIR HFA/PROVENTIL HFA/VENTOLIN HFA) 108 (90 Base) MCG/ACT inhaler  apixaban ANTICOAGULANT (ELIQUIS) 2.5 MG tablet  atenolol (TENORMIN) 25 MG tablet  calcium Citrate-vitamin D  500-400 MG-UNIT CHEW  childrens multivitamin w/iron (FLINTSTONES COMPLETE) 60 MG chewable tablet  cholecalciferol 125 MCG (5000 UT) CAPS  cyanocobalamin (CYANOCOBALAMIN) 1000 MCG/ML injection  dexAMETHasone (DECADRON) 4 MG tablet  diclofenac (VOLTAREN) 1 % topical gel  ferrous sulfate (CASSANDRA-IN-SOL) 75 (15 FE) MG/ML oral drops  hydrocortisone 2.5 % cream  lidocaine (XYLOCAINE) 5 % external ointment  naloxone (NARCAN) 4 MG/0.1ML nasal spray  ondansetron (ZOFRAN) 8 MG tablet  oxyCODONE (ROXICODONE) 5 MG tablet  polyethylene glycol (MIRALAX) 17 GM/Dose powder  prochlorperazine (COMPAZINE) 5 MG tablet  senna-docusate (SENOKOT-S/PERICOLACE) 8.6-50 MG tablet  triamcinolone (KENALOG) 0.1 % external ointment  zolpidem (AMBIEN) 10 MG tablet      Allergies   Allergen Reactions    Ibuprofen Nausea and Vomiting    Shrimp     Sulfa Antibiotics Rash     Patient started on bactrim 7/24/24 tolerating medication without rash on 7/25      Family History  Family History   Problem Relation Age of Onset    Heart Disease Father     Heart Failure Father     No Known Problems Brother     No Known Problems Brother     No Known Problems Brother     Pancreatitis Brother     Hypertension Sister     Peripheral Vascular Disease Sister     No Known Problems Sister     No Known Problems Son     No Known Problems Daughter      Social History   Social History     Tobacco Use    Smoking status: Never    Smokeless tobacco: Never   Substance Use Topics    Alcohol use: Not Currently    Drug use: No      A medically appropriate review of systems was performed with pertinent positives and negatives noted in the HPI, and all other systems negative.    Physical Exam   BP: 102/82  Pulse: 52  Temp: 97.6  F (36.4  C)  Resp: 15  SpO2: 100 %  Physical Exam  GEN: Well appearing, non toxic, cooperative, cachectic  HEENT: normocephalic and atraumatic, PERRLA, EOMI  CV: well-perfused, normal skin color for ethnicity, sinus bradycardia  PULM: breathing comfortably, in  no respiratory distress, CABL  ABD: nondistended, soft, moderate suprapubic tenderness, suprapubic catheter in place with scant surrounding mucinous drainage. No erythema, no fluctuance.  : suprapubic cath in place with cloudy/purulent appearance and slight pink color  No CVA tenderness  EXT: Full range of motion.  No edema.  NEURO: awake, conversant, grossly normal bilateral upper and lower extremity strength & ROM   SKIN: No rashes, ecchymosis, or lacerations  PSYCH: Calm and cooperative, interactive      ED Course, Procedures, & Data      Procedures            EKG Interpretation:      Interpreted by Marcia Barnhart MD  Time reviewed: 0745  Symptoms at time of EKG: none   Rhythm: sinus tachycardia  Rate: normal  Axis: normal  Ectopy: none  Conduction: normal  ST Segments/ T Waves: No ST-T wave changes  Q Waves: none  Comparison to prior: Unchanged    Clinical Impression: sinus tachycardia     Results for orders placed or performed during the hospital encounter of 11/07/24   XR Chest 2 Views     Status: None    Narrative    Exam: XR CHEST 2 VIEWS, 11/7/2024 10:23 AM    Comparison: Chest radiograph, 9/20/2024    History: dyspnea, near syncope    Findings:  Frontal and lateral upright views of the chest. Right chest  Port-A-Cath with tip in the mid SVC. Midline trachea. Cardiac  silhouette within normal limits. Sharp costophrenic angles. No  pneumothorax. No focal airspace opacity. Unchanged left basilar  calcified granuloma correlating with CT dated 5/3/2024.       Impression    Impression: No acute cardiopulmonary pathology is identified. Clear  chest.    I have personally reviewed the examination and initial interpretation  and I agree with the findings.    SORAYA STARKEY MD         SYSTEM ID:  [id number]   CT Abdomen Pelvis w Contrast     Status: None    Narrative    EXAMINATION: CT ABDOMEN PELVIS W CONTRAST, 11/7/2024 12:01 PM    INDICATION: abdominal pain, changes in urinary cath, near syncope;  complex previous  uterine cancer hx & surgical hx    COMPARISON: CT abdomen and pelvis 7/21/2024    TECHNIQUE: CT scan of the abdomen and pelvis was performed on  multidetector CT scanner using volumetric acquisition technique and  images were reconstructed in multiple planes with variable thickness  and reviewed on dedicated workstations.     FINDINGS:    Lower thorax: Normal.  Calcified pulmonary nodule in the left lower  lobe.      Liver: Within normal limits. Stable focal hypodensity in the left lobe  of the liver measuring 8 x 7 mm (series 3, image 10), likely hepatic  cyst. No intrahepatic biliary ductal dilation.      Biliary System: Normal gallbladder. No extrahepatic biliary ductal  dilation.    Spleen: Within normal limits. Splenic granulomas.    Pancreas: Within normal limits. No pancreatic ductal dilation.       Adrenal glands: No nodule.    Kidneys: mild hydronephrosis, left greater than right. No obstructing  stone.    Gastrointestinal tract: Postoperative changes of Kiko-en-Y gastric  bypass with small hiatal hernia. Normal caliber small and large loops  of bowel.       Mesentery/peritoneum/retroperitoneum: No free air or fluid.    Lymph nodes: No significant lymphadenopathy.    Vasculature: Normal caliber abdominal aorta.  Major vasculature  appears patent.    Pelvis: Total hysterectomy and bilateral salpingo-oophorectomy.   Decompressed bladder, with air-fluid level and suprapubic catheter.  The previously noted scattered foci of air along the right posterior  bladder has resolved.    Bones and soft tissues: No acute osseous abnormality. Multilevel  degenerative changes of the lumbar spine, with stable grade 1  anterolisthesis of L3 on L4, and grade 2 anterolisthesis of L4 on L5.  Multilevel vacuum disc phenomena, greatest at L5-S1. Mild  retrolisthesis of L1 on L2. Diffuse soft tissue anasarca. No organized  soft tissue fluid collection.        Impression    IMPRESSION:  1. No acute abnormality in the abdomen and  pelvis.  2. Stable postsurgical changes of hysterectomy and bilateral  salpingo-oophorectomy and Kiko-en-Y gastric bypass.  3. Stable mild bilateral hydronephrosis, left greater than right.  4. Diffuse anasarca.    I have personally reviewed the examination and initial interpretation  and I agree with the findings.    SILVIA ALEJANDRO,          SYSTEM ID:  [id number]   INR     Status: Abnormal   Result Value Ref Range    INR 1.21 (H) 0.85 - 1.15   Comprehensive metabolic panel     Status: Abnormal   Result Value Ref Range    Sodium 135 135 - 145 mmol/L    Potassium 2.7 (L) 3.4 - 5.3 mmol/L    Carbon Dioxide (CO2) 27 22 - 29 mmol/L    Anion Gap 12 7 - 15 mmol/L    Urea Nitrogen 11.9 8.0 - 23.0 mg/dL    Creatinine 0.96 (H) 0.51 - 0.95 mg/dL    GFR Estimate 63 >60 mL/min/1.73m2    Calcium 7.6 (L) 8.8 - 10.4 mg/dL    Chloride 96 (L) 98 - 107 mmol/L    Glucose 109 (H) 70 - 99 mg/dL    Alkaline Phosphatase 121 40 - 150 U/L    AST 21 0 - 45 U/L    ALT 12 0 - 50 U/L    Protein Total 4.9 (L) 6.4 - 8.3 g/dL    Albumin 2.3 (L) 3.5 - 5.2 g/dL    Bilirubin Total 0.5 <=1.2 mg/dL   Lactic acid whole blood with 1x repeat in 2 hr when >2     Status: Abnormal   Result Value Ref Range    Lactic Acid, Initial 2.2 (H) 0.7 - 2.0 mmol/L   Troponin T, High Sensitivity     Status: Abnormal   Result Value Ref Range    Troponin T, High Sensitivity 26 (H) <=14 ng/L   CBC with platelets and differential     Status: Abnormal   Result Value Ref Range    WBC Count 8.0 4.0 - 11.0 10e3/uL    RBC Count 2.97 (L) 3.80 - 5.20 10e6/uL    Hemoglobin 8.9 (L) 11.7 - 15.7 g/dL    Hematocrit 27.9 (L) 35.0 - 47.0 %    MCV 94 78 - 100 fL    MCH 30.0 26.5 - 33.0 pg    MCHC 31.9 31.5 - 36.5 g/dL    RDW 18.4 (H) 10.0 - 15.0 %    Platelet Count 184 150 - 450 10e3/uL    % Neutrophils 76 %    % Lymphocytes 14 %    % Monocytes 10 %    % Eosinophils 0 %    % Basophils 0 %    % Immature Granulocytes 1 %    NRBCs per 100 WBC 0 <1 /100    Absolute Neutrophils 6.1 1.6 - 8.3  10e3/uL    Absolute Lymphocytes 1.1 0.8 - 5.3 10e3/uL    Absolute Monocytes 0.8 0.0 - 1.3 10e3/uL    Absolute Eosinophils 0.0 0.0 - 0.7 10e3/uL    Absolute Basophils 0.0 0.0 - 0.2 10e3/uL    Absolute Immature Granulocytes 0.0 <=0.4 10e3/uL    Absolute NRBCs 0.0 10e3/uL   UA with Microscopic reflex to Culture     Status: Abnormal    Specimen: Urine, Clean Catch   Result Value Ref Range    Color Urine Orange (A) Colorless, Straw, Light Yellow, Yellow    Appearance Urine Cloudy (A) Clear    Glucose Urine Negative Negative mg/dL    Bilirubin Urine Negative Negative    Ketones Urine Negative Negative mg/dL    Specific Gravity Urine 1.005 1.003 - 1.035    Blood Urine Large (A) Negative    pH Urine 6.5 5.0 - 7.0    Protein Albumin Urine 50 (A) Negative mg/dL    Urobilinogen Urine Normal Normal, 2.0 mg/dL    Nitrite Urine Positive (A) Negative    Leukocyte Esterase Urine Large (A) Negative    WBC Clumps Urine Present (A) None Seen /HPF    Budding Yeast Urine Few (A) None Seen /HPF    Mucus Urine Present (A) None Seen /LPF    RBC Urine 5 (H) <=2 /HPF    WBC Urine >182 (H) <=5 /HPF    Squamous Epithelials Urine 2 (H) <=1 /HPF    Transitional Epithelials Urine 3 (H) <=1 /HPF    Narrative    Urine Culture ordered based on laboratory criteria   Lactic acid whole blood     Status: Abnormal   Result Value Ref Range    Lactic Acid 2.7 (H) 0.7 - 2.0 mmol/L   Troponin T, High Sensitivity     Status: Abnormal   Result Value Ref Range    Troponin T, High Sensitivity 22 (H) <=14 ng/L   Magnesium     Status: Abnormal   Result Value Ref Range    Magnesium 1.3 (L) 1.7 - 2.3 mg/dL   EKG 12-lead, tracing only     Status: None (Preliminary result)   Result Value Ref Range    Systolic Blood Pressure  mmHg    Diastolic Blood Pressure  mmHg    Ventricular Rate 103 BPM    Atrial Rate 103 BPM    NC Interval 136 ms    QRS Duration 68 ms     ms    QTc 398 ms    P Axis 70 degrees    R AXIS 12 degrees    T Axis 210 degrees    Interpretation ECG        Sinus tachycardia  Cannot rule out Anterior infarct , age undetermined  Abnormal ECG     CBC with platelets differential     Status: Abnormal    Narrative    The following orders were created for panel order CBC with platelets differential.  Procedure                               Abnormality         Status                     ---------                               -----------         ------                     CBC with platelets and d...[[id number]]  Abnormal            Final result                 Please view results for these tests on the individual orders.     Medications   piperacillin-tazobactam (ZOSYN) intermittent infusion 3.375 g (0 g Intravenous Stopped 11/7/24 7365)   acetaminophen (TYLENOL) tablet 650 mg (has no administration in time range)   albuterol (PROVENTIL HFA/VENTOLIN HFA) inhaler (has no administration in time range)   apixaban ANTICOAGULANT (ELIQUIS) tablet 2.5 mg (has no administration in time range)   atenolol (TENORMIN) half-tab 12.5 mg (has no administration in time range)   diclofenac (VOLTAREN) 1 % topical gel 4 g (has no administration in time range)   ferrous sulfate (CASSANDRA-IN-SOL) oral drops 15 mg (has no administration in time range)   ondansetron (ZOFRAN) tablet 8 mg (has no administration in time range)   oxyCODONE (ROXICODONE) tablet 5-10 mg (has no administration in time range)   polyethylene glycol (MIRALAX) powder 17 g (has no administration in time range)   prochlorperazine (COMPAZINE) tablet 5-10 mg (has no administration in time range)   senna-docusate (SENOKOT-S/PERICOLACE) 8.6-50 MG per tablet 1 tablet (has no administration in time range)   lidocaine 1 % 0.1-1 mL (has no administration in time range)   lidocaine (LMX4) cream (has no administration in time range)   sodium chloride (PF) 0.9% PF flush 3 mL (has no administration in time range)   sodium chloride (PF) 0.9% PF flush 3 mL (has no administration in time range)   Patient is already receiving anticoagulation  with heparin, enoxaparin (LOVENOX), warfarin (COUMADIN)  or other anticoagulant medication (has no administration in time range)   lactated ringers BOLUS 1,000 mL (0 mLs Intravenous Stopped 11/7/24 1405)   potassium chloride (KAYCIEL) solution 20 mEq (20 mEq Oral $Given 11/7/24 1122)   potassium chloride 10 mEq in 100 mL sterile water infusion (0 mEq Intravenous Stopped 11/7/24 1418)   iopamidol (ISOVUE-370) solution 86 mL (86 mLs Intravenous $Given 11/7/24 1151)   sodium chloride (PF) 0.9% PF flush 72 mL (72 mLs Intravenous $Given 11/7/24 1151)   magnesium sulfate 4 g in 100 mL sterile water intermittent infusion (0 g Intravenous Stopped 11/7/24 1525)   lactated ringers BOLUS 500 mL (500 mLs Intravenous $New Bag 11/7/24 1654)     Labs Ordered and Resulted from Time of ED Arrival to Time of ED Departure   INR - Abnormal       Result Value    INR 1.21 (*)    COMPREHENSIVE METABOLIC PANEL - Abnormal    Sodium 135      Potassium 2.7 (*)     Carbon Dioxide (CO2) 27      Anion Gap 12      Urea Nitrogen 11.9      Creatinine 0.96 (*)     GFR Estimate 63      Calcium 7.6 (*)     Chloride 96 (*)     Glucose 109 (*)     Alkaline Phosphatase 121      AST 21      ALT 12      Protein Total 4.9 (*)     Albumin 2.3 (*)     Bilirubin Total 0.5     LACTIC ACID WHOLE BLOOD WITH 1X REPEAT IN 2 HR WHEN >2 - Abnormal    Lactic Acid, Initial 2.2 (*)    TROPONIN T, HIGH SENSITIVITY - Abnormal    Troponin T, High Sensitivity 26 (*)    CBC WITH PLATELETS AND DIFFERENTIAL - Abnormal    WBC Count 8.0      RBC Count 2.97 (*)     Hemoglobin 8.9 (*)     Hematocrit 27.9 (*)     MCV 94      MCH 30.0      MCHC 31.9      RDW 18.4 (*)     Platelet Count 184      % Neutrophils 76      % Lymphocytes 14      % Monocytes 10      % Eosinophils 0      % Basophils 0      % Immature Granulocytes 1      NRBCs per 100 WBC 0      Absolute Neutrophils 6.1      Absolute Lymphocytes 1.1      Absolute Monocytes 0.8      Absolute Eosinophils 0.0      Absolute  Basophils 0.0      Absolute Immature Granulocytes 0.0      Absolute NRBCs 0.0     ROUTINE UA WITH MICROSCOPIC REFLEX TO CULTURE - Abnormal    Color Urine Orange (*)     Appearance Urine Cloudy (*)     Glucose Urine Negative      Bilirubin Urine Negative      Ketones Urine Negative      Specific Gravity Urine 1.005      Blood Urine Large (*)     pH Urine 6.5      Protein Albumin Urine 50 (*)     Urobilinogen Urine Normal      Nitrite Urine Positive (*)     Leukocyte Esterase Urine Large (*)     WBC Clumps Urine Present (*)     Budding Yeast Urine Few (*)     Mucus Urine Present (*)     RBC Urine 5 (*)     WBC Urine >182 (*)     Squamous Epithelials Urine 2 (*)     Transitional Epithelials Urine 3 (*)    LACTIC ACID WHOLE BLOOD - Abnormal    Lactic Acid 2.7 (*)    TROPONIN T, HIGH SENSITIVITY - Abnormal    Troponin T, High Sensitivity 22 (*)    MAGNESIUM - Abnormal    Magnesium 1.3 (*)    LACTIC ACID WHOLE BLOOD   TROPONIN T, HIGH SENSITIVITY   URINE CULTURE   BLOOD CULTURE     CT Abdomen Pelvis w Contrast   Final Result   IMPRESSION:   1. No acute abnormality in the abdomen and pelvis.   2. Stable postsurgical changes of hysterectomy and bilateral   salpingo-oophorectomy and Kiko-en-Y gastric bypass.   3. Stable mild bilateral hydronephrosis, left greater than right.   4. Diffuse anasarca.      I have personally reviewed the examination and initial interpretation   and I agree with the findings.      SILVIA ALLEN DO            SYSTEM ID:  [id number]      XR Chest 2 Views   Final Result   Impression: No acute cardiopulmonary pathology is identified. Clear   chest.      I have personally reviewed the examination and initial interpretation   and I agree with the findings.      SORAYA STARKEY MD            SYSTEM ID:  [id number]             Critical care was not performed.     Medical Decision Making  The patient's presentation was of high complexity (a chronic illness severe exacerbation, progression, or side effect of  treatment).    The patient's evaluation involved:  an assessment requiring an independent historian (see separate area of note for details)  review of external note(s) from 3+ sources (see separate area of note for details)  review of 3+ test result(s) ordered prior to this encounter (see separate area of note for details)  ordering and/or review of 3+ test(s) in this encounter (see separate area of note for details)  discussion of management or test interpretation with another health professional (see separate area of note for details)    The patient's management necessitated high risk (a decision regarding hospitalization).    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 I worry about hypovolemia and orthostatic hypotension contributing to her symptoms today, but also have concerns about sepsis, especially with her history. Will start with 30cc/kg IV fluids, but will hold on antibiotics pending labs/changes in her temperature. Considered PE, but is anticoagulated with no missed doses, no unilateral leg swelling. Considered pneumonia/covid/flu - pending Xr & swab. Considered UTI. Pending UA. Considered intraabdominal infection - pending CT. No chest pain, EKG shows sinus tachycardia after being moved to a room. No ischemia.    Will plan to contact her oncology team while she's here to discuss further treatment/plan for her  chemo    5:35 PM Concerning for complicated UTI. Admitted to gyn onc    I have reviewed the nursing notes. I have reviewed the findings, diagnosis, plan and need for follow up with the patient.    New Prescriptions    No medications on file       Final diagnoses:   Urinary tract infection associated with cystostomy catheter, initial encounter (H)   Hypokalemia   Hypomagnesemia   Near syncope       Marcia Barnhart MD   [facility] EMERGENCY DEPARTMENT  11/7/2024     Marcia Barnhart MD  11/07/24 5650

## 2024-11-07 NOTE — ED TRIAGE NOTES
Pt ambulatory to triage with c/o abnormal labs. Pt states that she was inroute to her transfusion appointment, but was feeling more SOB than normal. Pt also began feeling dizzy, and unable to get herself out of the vehicle. Pt states she now feels back to baseline, here for her infusion.      Triage Assessment (Adult)       Row Name 11/07/24 0922          Triage Assessment    Airway WDL WDL        Respiratory WDL    Respiratory WDL WDL        Skin Circulation/Temperature WDL    Skin Circulation/Temperature WDL WDL        Cardiac WDL    Cardiac WDL WDL        Peripheral/Neurovascular WDL    Peripheral Neurovascular WDL WDL        Cognitive/Neuro/Behavioral WDL    Cognitive/Neuro/Behavioral WDL WDL

## 2024-11-08 ENCOUNTER — APPOINTMENT (OUTPATIENT)
Dept: OCCUPATIONAL THERAPY | Facility: CLINIC | Age: 69
DRG: 690 | End: 2024-11-08
Payer: COMMERCIAL

## 2024-11-08 LAB
ANION GAP SERPL CALCULATED.3IONS-SCNC: 8 MMOL/L (ref 7–15)
ATRIAL RATE - MUSE: 103 BPM
BACTERIA UR CULT: NORMAL
BASOPHILS # BLD AUTO: 0 10E3/UL (ref 0–0.2)
BASOPHILS NFR BLD AUTO: 0 %
BUN SERPL-MCNC: 9.7 MG/DL (ref 8–23)
CALCIUM SERPL-MCNC: 7.8 MG/DL (ref 8.8–10.4)
CHLORIDE SERPL-SCNC: 96 MMOL/L (ref 98–107)
CREAT SERPL-MCNC: 0.86 MG/DL (ref 0.51–0.95)
DIASTOLIC BLOOD PRESSURE - MUSE: NORMAL MMHG
EGFRCR SERPLBLD CKD-EPI 2021: 72 ML/MIN/1.73M2
EOSINOPHIL # BLD AUTO: 0.1 10E3/UL (ref 0–0.7)
EOSINOPHIL NFR BLD AUTO: 2 %
ERYTHROCYTE [DISTWIDTH] IN BLOOD BY AUTOMATED COUNT: 19 % (ref 10–15)
GLUCOSE SERPL-MCNC: 92 MG/DL (ref 70–99)
HCO3 SERPL-SCNC: 28 MMOL/L (ref 22–29)
HCT VFR BLD AUTO: 27.3 % (ref 35–47)
HGB BLD-MCNC: 8.5 G/DL (ref 11.7–15.7)
HOLD SPECIMEN: NORMAL
IMM GRANULOCYTES # BLD: 0 10E3/UL
IMM GRANULOCYTES NFR BLD: 1 %
INTERPRETATION ECG - MUSE: NORMAL
LYMPHOCYTES # BLD AUTO: 1.8 10E3/UL (ref 0.8–5.3)
LYMPHOCYTES NFR BLD AUTO: 28 %
MAGNESIUM SERPL-MCNC: 2.2 MG/DL (ref 1.7–2.3)
MCH RBC QN AUTO: 29.4 PG (ref 26.5–33)
MCHC RBC AUTO-ENTMCNC: 31.1 G/DL (ref 31.5–36.5)
MCV RBC AUTO: 95 FL (ref 78–100)
MONOCYTES # BLD AUTO: 0.9 10E3/UL (ref 0–1.3)
MONOCYTES NFR BLD AUTO: 14 %
NEUTROPHILS # BLD AUTO: 3.6 10E3/UL (ref 1.6–8.3)
NEUTROPHILS NFR BLD AUTO: 55 %
NRBC # BLD AUTO: 0 10E3/UL
NRBC BLD AUTO-RTO: 0 /100
P AXIS - MUSE: 70 DEGREES
PHOSPHATE SERPL-MCNC: 2.4 MG/DL (ref 2.5–4.5)
PLATELET # BLD AUTO: 186 10E3/UL (ref 150–450)
POTASSIUM SERPL-SCNC: 3.1 MMOL/L (ref 3.4–5.3)
POTASSIUM SERPL-SCNC: 3.8 MMOL/L (ref 3.4–5.3)
PR INTERVAL - MUSE: 136 MS
QRS DURATION - MUSE: 68 MS
QT - MUSE: 304 MS
QTC - MUSE: 398 MS
R AXIS - MUSE: 12 DEGREES
RBC # BLD AUTO: 2.89 10E6/UL (ref 3.8–5.2)
SODIUM SERPL-SCNC: 132 MMOL/L (ref 135–145)
SYSTOLIC BLOOD PRESSURE - MUSE: NORMAL MMHG
T AXIS - MUSE: 210 DEGREES
VENTRICULAR RATE- MUSE: 103 BPM
WBC # BLD AUTO: 6.5 10E3/UL (ref 4–11)

## 2024-11-08 PROCEDURE — 84100 ASSAY OF PHOSPHORUS: CPT

## 2024-11-08 PROCEDURE — 36415 COLL VENOUS BLD VENIPUNCTURE: CPT | Performed by: NURSE PRACTITIONER

## 2024-11-08 PROCEDURE — 85025 COMPLETE CBC W/AUTO DIFF WBC: CPT | Performed by: NURSE PRACTITIONER

## 2024-11-08 PROCEDURE — 99222 1ST HOSP IP/OBS MODERATE 55: CPT | Performed by: PHYSICIAN ASSISTANT

## 2024-11-08 PROCEDURE — 36415 COLL VENOUS BLD VENIPUNCTURE: CPT | Performed by: OBSTETRICS & GYNECOLOGY

## 2024-11-08 PROCEDURE — 250N000011 HC RX IP 250 OP 636: Performed by: NURSE PRACTITIONER

## 2024-11-08 PROCEDURE — 83735 ASSAY OF MAGNESIUM: CPT | Performed by: NURSE PRACTITIONER

## 2024-11-08 PROCEDURE — 99233 SBSQ HOSP IP/OBS HIGH 50: CPT | Mod: GC | Performed by: OBSTETRICS & GYNECOLOGY

## 2024-11-08 PROCEDURE — 250N000013 HC RX MED GY IP 250 OP 250 PS 637: Performed by: NURSE PRACTITIONER

## 2024-11-08 PROCEDURE — 250N000013 HC RX MED GY IP 250 OP 250 PS 637: Performed by: OBSTETRICS & GYNECOLOGY

## 2024-11-08 PROCEDURE — 120N000002 HC R&B MED SURG/OB UMMC

## 2024-11-08 PROCEDURE — 97535 SELF CARE MNGMENT TRAINING: CPT | Mod: GO

## 2024-11-08 PROCEDURE — 80048 BASIC METABOLIC PNL TOTAL CA: CPT | Performed by: NURSE PRACTITIONER

## 2024-11-08 PROCEDURE — G0463 HOSPITAL OUTPT CLINIC VISIT: HCPCS

## 2024-11-08 PROCEDURE — 84132 ASSAY OF SERUM POTASSIUM: CPT | Performed by: OBSTETRICS & GYNECOLOGY

## 2024-11-08 PROCEDURE — 97166 OT EVAL MOD COMPLEX 45 MIN: CPT | Mod: GO

## 2024-11-08 RX ORDER — POTASSIUM CHLORIDE 1.5 G/1.58G
40 POWDER, FOR SOLUTION ORAL ONCE
Status: COMPLETED | OUTPATIENT
Start: 2024-11-08 | End: 2024-11-08

## 2024-11-08 RX ORDER — SULFAMETHOXAZOLE AND TRIMETHOPRIM 400; 80 MG/1; MG/1
1 TABLET ORAL 2 TIMES DAILY
Status: DISCONTINUED | OUTPATIENT
Start: 2024-11-09 | End: 2024-11-09

## 2024-11-08 RX ORDER — POTASSIUM CHLORIDE 750 MG/1
40 TABLET, EXTENDED RELEASE ORAL ONCE
Status: DISCONTINUED | OUTPATIENT
Start: 2024-11-08 | End: 2024-11-08

## 2024-11-08 RX ADMIN — POTASSIUM & SODIUM PHOSPHATES POWDER PACK 280-160-250 MG 1 PACKET: 280-160-250 PACK at 20:16

## 2024-11-08 RX ADMIN — OXYCODONE HYDROCHLORIDE 10 MG: 5 TABLET ORAL at 13:36

## 2024-11-08 RX ADMIN — PIPERACILLIN SODIUM AND TAZOBACTAM SODIUM 3.38 G: 3; .375 INJECTION, SOLUTION INTRAVENOUS at 01:20

## 2024-11-08 RX ADMIN — Medication 15 MG: at 07:47

## 2024-11-08 RX ADMIN — APIXABAN 2.5 MG: 2.5 TABLET, FILM COATED ORAL at 20:16

## 2024-11-08 RX ADMIN — OXYCODONE HYDROCHLORIDE 10 MG: 5 TABLET ORAL at 22:09

## 2024-11-08 RX ADMIN — PIPERACILLIN SODIUM AND TAZOBACTAM SODIUM 3.38 G: 3; .375 INJECTION, SOLUTION INTRAVENOUS at 13:39

## 2024-11-08 RX ADMIN — PIPERACILLIN SODIUM AND TAZOBACTAM SODIUM 3.38 G: 3; .375 INJECTION, SOLUTION INTRAVENOUS at 06:30

## 2024-11-08 RX ADMIN — Medication 12.5 MG: at 07:43

## 2024-11-08 RX ADMIN — POTASSIUM CHLORIDE 40 MEQ: 1.5 POWDER, FOR SOLUTION ORAL at 16:21

## 2024-11-08 RX ADMIN — POTASSIUM & SODIUM PHOSPHATES POWDER PACK 280-160-250 MG 1 PACKET: 280-160-250 PACK at 16:21

## 2024-11-08 RX ADMIN — PIPERACILLIN SODIUM AND TAZOBACTAM SODIUM 3.38 G: 3; .375 INJECTION, SOLUTION INTRAVENOUS at 20:16

## 2024-11-08 RX ADMIN — APIXABAN 2.5 MG: 2.5 TABLET, FILM COATED ORAL at 07:45

## 2024-11-08 RX ADMIN — OXYCODONE HYDROCHLORIDE 10 MG: 5 TABLET ORAL at 01:26

## 2024-11-08 RX ADMIN — POTASSIUM & SODIUM PHOSPHATES POWDER PACK 280-160-250 MG 1 PACKET: 280-160-250 PACK at 23:46

## 2024-11-08 RX ADMIN — OXYCODONE HYDROCHLORIDE 10 MG: 5 TABLET ORAL at 07:43

## 2024-11-08 NOTE — CONSULTS
[facility] [place] Nurse Inpatient Assessment     Consulted for: Pressure injury coccyx      Patient History (according to provider note(s):      lAis Hartman is a 69 year old female with serous endometrial adenocarcinoma of uterus s/p SNEHA, BSO (7/12/2024) s/p Cycle 3 carbo/taxel (10/15/24), cystotomy s/p suprapubic catheter w/ hx of ESBL urosepsis & bacteremia, A-fib on eliquis, HTN, CKD stage 3a, and anemia who presents to the emergency department for weakness, near syncope, and dyspnea.     Pt is accompanied by her grandson who gives hx along with pt. They note she has been having more cloudy/pink urine in her catheter for the last few days. She said her catheter was exchanged this week, but not able to find records in the chart. Prior to this week, she said it had been a long while since it was changed. She was supposed to start Cycle 4 of chemo today, but experienced episode on the way today where she stood up and immediately afterwards felt very dizzy, weak, and short of breath. Denies any falls. This lasted for a few minutes and she was unable to get herself out of the car on her own. However, it has since resolved. She denies any chest pain, cough, fevers, chills.      Does endorse persistent abdominal pain since surgery, ongoing nausea, and occasional vomiting. No new weakness, numbness, or tingling. No changes in mentation.     Assessment:      Areas visualized during today's visit: Focused:, Perineal area, and Sacrum/coccyx    Pressure Injury Location: Coccyx    Last photo: 11/8  Wound type: Pressure Injury and Shear     Pressure Injury Stage: site of previously healed pressure injury unknown depth, present on admission        Wound history/plan of care:   Pt well known to the [facility] service. Pt has chronic skin break down to sacrum/coccyx area. Per Pt she spends most of her time in her recliner and has been less mobile lately. Yaa-wound skin is a mixture  "of scar tissue and new epithelial tissue. Wound base is smooth fibrin    Wound base: 100 % Fibrin     Palpation of the wound bed: normal      Drainage: none     Description of drainage: none     Measurements (length x width x depth, in cm) 1.5  x 1.5  x  0.2 cm - three small open areas within this dimension     Tunneling N/A     Undermining N/A  Periwound skin: Scar tissue      Color: normal and consistent with surrounding tissue      Temperature: normal   Odor: none  Pain: moderate, sharp and tender  Pain intervention prior to dressing change: slow and gentle cares   Treatment goal: Heal  and Protection  STATUS: initial assessment  Supplies ordered: at bedside    My PI Risk Assessment     Sensory Perception: 3 - Slightly Limited     Moisture: 3 - Occasionally moist      Activity: 3 - Walks occasionally      Mobility: 2 - Very limited     Nutrition: 3 - Adequate     Friction/Shear: 1 - Problem     TOTAL: 15      Treatment Plan:     Coccyx wound(s): Every 3 days   Cleanse the area with microKlenz and pat dry.  Apply No sting film barrier to periwound skin.  Cover wound with Sacral Mepilex (#[id number])  Change dressing Q 3 days.  Turn and reposition Q 2hrs side to side only.  Ensure pt has Tanner-cushion while sitting up in the chair.  FYI- If pt has constant incontinent loose stools needing dressing changes Q shift please discontinue the Mepilex dressing and apply criticaid barrier paste BID and PRN.      Pressure Injury Prevention (PIP) Plan:  If patient is declining pressure injury prevention interventions: Explore reason why and address patient's concerns, Educate on pressure injury risk and prevention intervention(s), If patient is still declining, document \"informed refusal\" , and Ensure Care team is aware ( provider, charge nurse, etc)  Mattress: Follow bed algorithm, add Low Air Loss (Air+) mattress pump if skin is very moist or constantly moist.   HOB: Maintain at or below 30 degrees, unless " contraindicated  Repositioning in bed: Every 1-2 hours , Left/right positioning; avoid supine, Raise foot of bed prior to raising head of bed, to reduce patient sliding down (shear), and Frequent microturns using positioning wedges, as patient tolerates  Heels: Keep elevated off mattress and Pillows under calves  Protective Dressing: Sacral Mepilex for prevention (#261271),  especially for the agitated patient   Positioning Equipment:None  Chair positioning: Chair cushion (#[id number]) , Assist patient to reposition hourly, and Do NOT use a donut for sitting (this increases pressure to smaller area and creates a higher potential for injury)    If patient has a buttock pressure injury, or high risk for PI use chair cushion or SPS.  Moisture Management: Perineal cleansing /protection: Follow Incontinence Protocol, Avoid brief in bed, Clean and dry skin folds with bathing , Consider InterDry (#464107) between folds, and Moisturize dry skin  Under Devices: Inspect skin under all medical devices during skin inspection , Ensure tubes are stabilized without tension, and Ensure patient is not lying on medical devices or equipment when repositioned  Ask provider to discontinue device when no longer needed.     Orders: Written    RECOMMEND PRIMARY TEAM ORDER: None, at this time  Education provided: importance of repositioning, wound progress, and Off-loading pressure  Discussed plan of care with: Patient  WOC nurse follow-up plan: weekly  Notify WOC if wound(s) deteriorate.  Nursing to notify the Provider(s) and re-consult the WOC Nurse if new skin concern.    DATA:     Current support surface: Standard  Standard gel mattress (Isoflex)  Containment of urine/stool: Incontinent pad in bed  BMI: There is no height or weight on file to calculate BMI.   Active diet order: Orders Placed This Encounter      Advance Diet as Tolerated: Regular Diet Adult     Output: I/O last 3 completed shifts:  In: 100 [IV Piggyback:100]  Out: 1000  [Urine:1000]     Labs:   Recent Labs   Lab 11/08/24  0651 11/07/24  1005   ALBUMIN  --  2.3*   HGB 8.5* 8.9*   INR  --  1.21*   WBC 6.5 8.0     Pressure injury risk assessment:   Sensory Perception: 3-->slightly limited  Moisture: 3-->occasionally moist  Activity: 3-->walks occasionally  Mobility: 3-->slightly limited  Nutrition: 2-->probably inadequate  Friction and Shear: 1-->problem  Yogi Score: 15    Gaurang Barba, RN, COCN, CCCN  [facility] RN Treatment Specialist  Pager no longer is use, please contact through [facility] group: [facility] Nurse [place]    Dept. Office Number: [phone]

## 2024-11-08 NOTE — H&P
[facility]  Gynecology Oncology History and Physical    Alis Hartman MRN# [id number]   Age: 69 year old YOB: 1955     Date of Admission:  11/7/2024    Primary care provider: Maria Fernanda Eckert             Chief Complaint:    Weakness, near syncope, and dyspnea.          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     Pt is accompanied by her grandson who gives hx along with pt. They note she has been having more cloudy/pink urine in her catheter for the last few days. She said her catheter was exchanged this week, but not able to find records in the chart. Prior to this week, she said it had been a long while since it was changed. She was supposed to start Cycle 4 of chemo today, but experienced episode on the way today where she stood up and immediately afterwards felt very dizzy, weak, and short of breath. Denies any falls. This lasted for a few minutes and she was unable to get herself out of the car on her own. However, it has since resolved. She denies any chest pain, cough, fevers, chills.      Does endorse persistent abdominal pain since surgery, ongoing nausea, and occasional vomiting. No new weakness, numbness, or tingling. No changes in mentation.           Cancer Treatment History:     Oncology History   Cervical cancer (H)   3/1996 Initial Diagnosis     Cervical cancer     Moderately differentiated squamous cell carcinoma. She was treated with primary radiation therapy, unsure if she also had chemotherapy or not.  This treatment was at [facility].      12/22/2014 Procedure     Exam under anesthesia with LASER to the vagina for  VAIN 3      5/24/2019 Procedure     12/27/18:  Pap:  HSIL, HPV+  5/24/19: PROCEDURES: Vaginal colposcopy, vaginal biopsy, CO2 laser of the vagina  VAGINAL BIOPSY:   - High grade squamous intraepithelial lesion (vaginal intraepithelial neoplasia 3)       3/12/2020 Procedure     10/14/19: Pap HSIL/other HPV+  2/28/2020: biopsy of vagina Vain III; MRI recommended prior to OR; however, patient did not complete this  3/12/2020: EUA, biopsies, LASER: VAIN III      6/8/2021 Procedure     6/8/2021: Exam under anesthesia, vaginal biopsies, laser ablation of the upper vagina with Dr. ePrez, biopsies returned showing necrosis, no dysplasia. Hyperbaric oxygen therapy discussed and referral place. Patient was unable to pursue this due to need for transportation to the [facility].      4/6/2023 Imaging     4/6/2023 CT Urogram: IMPRESSION:  1.  Negative CT urogram. No urinary calculi, solid renal mass, or evidence of upper tract urothelial malignancy.   2.  Enlarged uterus, with marked distention of the endometrial canal by intermediate density material, possibly a mix of fluid and blood products. This may be related to cervical stenosis given the history of prior pelvic radiation. Uterine enlargement may contribute to urinary frequency/urgency.   3.  Indeterminate left pelvic/perirectal mass with rim calcifications measuring up to 4.5 cm. Differentials include an old hematoma or an atypical enlarged lymph node. Consider pelvic MRI with contrast and subtraction imaging for further evaluation. The uterus and endometrium can also be further evaluated at that time.         Serous adenocarcinoma of uterus (H)     Initial Diagnosis     Uterine serous carcinoma      5/3/2024 Imaging     CT and ultrasound showing distended endometrial canal. No evidence of metastatic disease      5/17/2024 Surgery     D&C of the uterus with drainage of the uterine fluid under US guidance, lysis of vaginal adhesions, cystoscopy      5/17/2024 Pathology      Endometrial curettings: Endometrial serous carcinoma with extensive necrosis      7/12/2024 Surgery     Diagnostic laparoscopy converted to exploratory laparotomy, total abdominal hysterectomy, bilateral salpingo-oophorectomy, lysis of adhesions, bladder repair and insertion of suprapubic catheter by Dr. Monroy      7/12/2024 Pathology     Final pathology with uterine serous carcinoma, MMR-intact/[id number]/HER2 negative         7/12/2024 -  Cancer Staged     [id number] uterine serous carcinoma, omentum negative, washings negative, lymph nodes omitted due to radiation history and high grade histology      9/20/2024 - 9/27/2024 Hospital Admission     Admission for sepsis secondary to urinary tract infection        Chemotherapy     8/8/24: Cycle 1 Carboplatin AUC 5, paclitaxel 175mg/ m2  8/30/24: Cycle 2 Carboplatin AUC 5, paclitaxel 175mg/m2  10/15/24: Cycle 3 Carboplatin AUC 5, paclitaxel 135mg/m2, reduced due to recent sepsis  11/5/24: Plan for cycle 4 Carboplatin AUC 5, paclitaxel 135mg/m2              Past Medical History:     Past Medical History:   Diagnosis Date    Atrial fibrillation (H)     Depression     Hypertension     Malignant neoplasm of endocervix (H)     Tx with radiation    Near syncope 11/7/2024    Orthopnea 9/21/2024    Other chronic pain     Low back    Schizophrenia (H)     Urinary incontinence           Past Surgical History:      Past Surgical History:   Procedure Laterality Date    ABDOMINOPLASTY      BIOPSY CERVICAL, LOCAL EXCISION, SINGLE/MULTIPLE  10/26/2011    Procedure:BIOPSY CERVICAL, LOCAL EXCISION, SINGLE/MULTIPLE; EUA, cervical biopsies; Surgeon:BETTY TINEO; Location:[facility] OR    BIOPSY VAGINAL N/A 6/8/2021    Procedure: BIOPSY, VAGINA;  Surgeon: Dany Perez MD;  Location: [facility] OR    CYSTOSCOPY N/A 5/17/2024    Procedure: Cystoscopy;  Surgeon: Dany Perez MD;  Location: [facility]    CYSTOSTOMY, INSERT TUBE SUPRAPUBIC, COMBINED  7/12/2024    Procedure: Insertion of supra-pubic  catheter;  Surgeon: Dany Perez MD;  Location: [facility]    DILATION AND CURETTAGE, WITH ULTRASOUND GUIDANCE N/A 5/17/2024    Procedure: Dilation and curettage of the uterus with drainage of uterine fluid under ultrasound guidance, lysis of vaginal adhesions;  Surgeon: Dany Perez MD;  Location: [facility] OR    EXAM UNDER ANESTHESIA PELVIC N/A 3/12/2020    Procedure: Exam under anesthsia, vaginal biopsies, possible CO2 laser of the vagina;  Surgeon: Lilliam Roy MD;  [facility]: [facility]    GI SURGERY  2004    gastric bypass    HYSTERECTOMY TOTAL ABDOMINAL, BILATERAL SALPINGO-OOPHORECTOMY, COMBINED Bilateral 7/12/2024    Procedure: Diagnostic laparoscopy converted to exploratory laparotomy, total abdominal hysterectomy, bilateral salpingo-oophorectomy, lysis of adhesions >60 min;  Surgeon: Dany Perez MD;  Location: [facility]    INSERT PORT VASCULAR ACCESS N/A 8/26/2024    Procedure: Insert port vascular access;  Surgeon: Avery Gregory MD;  Location: [facility]    IR CHEST PORT PLACEMENT > 5 YRS OF AGE  8/26/2024    LASER CO2 VAGINA N/A 3/2/2015    Procedure: LASER CO2 VAGINA;  Surgeon: Mariela Abdalla MD;  Location: [facility] OR    LASER CO2 VAGINA N/A 5/24/2019    Procedure: Exam under anesthesia, vaginal biopsies, CO2 laser of the vagina;  Surgeon: Lilliam Roy MD;  Location: [facility]    LASER CO2 VAGINA N/A 6/8/2021    Procedure: Exam under anesthesia, laser ablation of the upper vagina;  Surgeon: Dany Perez MD;  Location: [facility]    REPAIR BLADDER N/A 7/12/2024    Procedure: Repair bladder;  Surgeon: Dany Perez MD;  Location: [facility]          Social History:     Social History     Tobacco Use    Smoking status: Never    Smokeless tobacco: Never   Substance Use Topics    Alcohol use: Not Currently          Family History:     Family History   Problem Relation Age of Onset    Heart Disease Father     Heart Failure Father     No Known Problems Brother     No Known Problems Brother     No  Known Problems Brother     Pancreatitis Brother     Hypertension Sister     Peripheral Vascular Disease Sister     No Known Problems Sister     No Known Problems Son     No Known Problems Daughter           Immunizations:     Immunization History   Administered Date(s) Administered    COVID-19 12+ (Pfizer) 11/09/2023    COVID-19 Bivalent 12+ (Pfizer) 09/22/2022    COVID-19 MONOVALENT 12+ (Pfizer) 04/19/2021, 05/10/2021, 12/03/2021    COVID-19 Monovalent 12+ (Pfizer 2022) 07/07/2022    DTAP (<7y) 08/05/2008    Hepatitis A (ADULT 19+) 08/05/2008    Hepatitis B, Adult 05/11/2007, 08/05/2008    Influenza (H1N1) 01/14/2010    Influenza (High Dose) Trivalent,PF (Fluzone) 11/07/2019, 10/27/2020, 10/15/2021, 09/22/2022    Influenza (IIV3) PF 10/24/2008, 10/05/2011    Influenza Vaccine 65+ (Fluzone HD) 10/15/2021, 09/28/2023    Influenza Vaccine >6 months,quad, PF 09/17/2015, 11/02/2016, 08/23/2017, 10/01/2018    Influenza, seasonal, injectable, PF 10/27/2008, 01/14/2010    Pneumo Conj 13-V (2010&after) 03/29/2019    Pneumococcal 20 valent Conjugate (Prevnar 20) 05/02/2024    Pneumococcal 23 valent 12/17/2008    TDAP (Adacel,Boostrix) 08/05/2008, 11/10/2022    Zoster recombinant adjuvanted (SHINGRIX) 11/05/2020, 10/15/2021          Allergies:     Allergies   Allergen Reactions    Ibuprofen Nausea and Vomiting    Shrimp     Sulfa Antibiotics Rash     Patient started on bactrim 7/24/24 tolerating medication without rash on 7/25           Medications:     Current Facility-Administered Medications   Medication Dose Route Frequency Provider Last Rate Last Admin    acetaminophen (TYLENOL) tablet 650 mg  650 mg Oral Q6H PRN Kelsey Mccracken APRN CNP        albuterol (PROVENTIL HFA/VENTOLIN HFA) inhaler  1-2 puff Inhalation Q4H PRN Kelsey Mccracken APRN CNP        apixaban ANTICOAGULANT (ELIQUIS) tablet 2.5 mg  2.5 mg Oral BID Kelsey Mccracken APRN CNP        [START ON 11/8/2024] atenolol (TENORMIN) half-tab 12.5 mg  12.5  mg Oral Daily Kelsey Mccracken APRN CNP        diclofenac (VOLTAREN) 1 % topical gel 4 g  4 g Topical 4x Daily PRN Kelsey Mccracken APRN CNP        [START ON 11/8/2024] ferrous sulfate (CASSANDRA-IN-SOL) oral drops 15 mg  15 mg Oral Daily Kelsey Mccracken APRN CNP        lidocaine (LMX4) cream   Topical Q1H PRN Kelsey Mccracken APRN CNP        lidocaine 1 % 0.1-1 mL  0.1-1 mL Other Q1H PRN Kelsey Mccracken APRN CNP        ondansetron (ZOFRAN) tablet 8 mg  8 mg Oral Q8H PRN Kelsey Mccracken APRN CNP        oxyCODONE (ROXICODONE) tablet 5-10 mg  5-10 mg Oral Q4H PRN Kelsey Mccracken APRN CNP        Patient is already receiving anticoagulation with heparin, enoxaparin (LOVENOX), warfarin (COUMADIN)  or other anticoagulant medication   Does not apply Continuous PRN Kelsey Mccracken APRN CNP        piperacillin-tazobactam (ZOSYN) intermittent infusion 3.375 g  3.375 g Intravenous Q6H Kelsey Mccracken APRN CNP   Stopped at 11/07/24 1545    polyethylene glycol (MIRALAX) Packet 17 g  17 g Oral Daily PRN Kelsey Mccracken APRN CNP        prochlorperazine (COMPAZINE) tablet 5-10 mg  5-10 mg Oral Q6H PRN Kelsey Mccracken APRN CNP        senna-docusate (SENOKOT-S/PERICOLACE) 8.6-50 MG per tablet 1 tablet  1 tablet Oral BID PRN Kelsey Mccracken APRN CNP        sodium chloride (PF) 0.9% PF flush 3 mL  3 mL Intracatheter Q8H Kelsey Mccracken APRN CNP   3 mL at 11/07/24 1747    sodium chloride (PF) 0.9% PF flush 3 mL  3 mL Intracatheter q1 min prn Kelsey Mccracken APRN CNP         Current Outpatient Medications   Medication Sig Dispense Refill    acetaminophen (TYLENOL) 325 MG tablet Take 2 tablets (650 mg) by mouth every 6 hours as needed for mild pain 24 tablet 0    albuterol (PROAIR HFA/PROVENTIL HFA/VENTOLIN HFA) 108 (90 Base) MCG/ACT inhaler Inhale 1-2 puffs into the lungs every 4 hours as needed for shortness of breath      apixaban ANTICOAGULANT (ELIQUIS) 2.5 MG tablet Take 1 tablet (2.5  mg) by mouth 2 times daily. 60 tablet 3    atenolol (TENORMIN) 25 MG tablet Take 0.5 tablets (12.5 mg) by mouth daily. 30 tablet 3    calcium Citrate-vitamin D 500-400 MG-UNIT CHEW Take 1 tablet by mouth daily      childrens multivitamin w/iron (FLINTSTONES COMPLETE) 60 MG chewable tablet Take 2 tablets by mouth daily      cholecalciferol 125 MCG (5000 UT) CAPS Take 5000 mg 4 times a week      cyanocobalamin (CYANOCOBALAMIN) 1000 MCG/ML injection Inject 1 mL (1,000 mcg) into a muscle every month.      dexAMETHasone (DECADRON) 4 MG tablet Take 2 tablets (8 mg) by mouth daily (with breakfast). Take daily x 3 days following chemo 6 tablet 11    diclofenac (VOLTAREN) 1 % topical gel Apply to: Skin on  Both/All Knee for pain. Topical 1% gel, 4 g applied TOPICALLY to lower extremities 3 times daily PRN ;      ferrous sulfate (CASSANDRA-IN-SOL) 75 (15 FE) MG/ML oral drops Take 15 mg by mouth daily      hydrocortisone 2.5 % cream Apply topically as needed      lidocaine (XYLOCAINE) 5 % external ointment Apply 1 Application topically as needed      naloxone (NARCAN) 4 MG/0.1ML nasal spray Spray 1 spray (4 mg) into one nostril alternating nostrils as needed for opioid reversal every 2-3 minutes until assistance arrives 2 each 0    ondansetron (ZOFRAN) 8 MG tablet Take 1 tablet (8 mg) by mouth every 8 hours as needed for nausea 20 tablet 0    oxyCODONE (ROXICODONE) 5 MG tablet Take 1 tablet (5 mg) by mouth every 6 hours as needed for breakthrough pain 30 tablet 0    polyethylene glycol (MIRALAX) 17 GM/Dose powder Take 17 g by mouth daily as needed for constipation 510 g 0    prochlorperazine (COMPAZINE) 5 MG tablet Take 1-2 tablets (5-10 mg) by mouth every 6 hours as needed for nausea or vomiting 40 tablet 0    senna-docusate (SENOKOT-S/PERICOLACE) 8.6-50 MG tablet Take 1 tablet by mouth 2 times daily 60 tablet 0    triamcinolone (KENALOG) 0.1 % external ointment Apply topically 3 times daily as needed for irritation. 30 g 1     zolpidem (AMBIEN) 10 MG tablet Take 10 mg by mouth nightly as needed            Review of Systems:    ROS: 10 point ROS neg other than the symptoms noted above in the HPI.         Physical Exam:     Vitals:    11/07/24 1223 11/07/24 1300 11/07/24 1500 11/07/24 1700   BP: 134/84 122/84 112/80 131/79   Pulse: 85 87 93 80   Resp:       Temp:       TempSrc:       SpO2:         General: NAD  CV: RRR, no m/g/r  Resp: CTAB  Abdomen: soft, mildly tender to palpation in bilateral lower quadrants, non-distended, suprapubic catheter in place with no surrounding erythema  Skin: chronic coccyx wound  Extremities: 1+ edema BLE, stable for pt   Drains/Lines: Catheter and bag has been exchanged in the ED, urine appears clear and yellow         Data:     Results for orders placed or performed during the hospital encounter of 11/07/24 (from the past 24 hours)   EKG 12-lead, tracing only   Result Value Ref Range    Systolic Blood Pressure  mmHg    Diastolic Blood Pressure  mmHg    Ventricular Rate 103 BPM    Atrial Rate 103 BPM    TX Interval 136 ms    QRS Duration 68 ms     ms    QTc 398 ms    P Axis 70 degrees    R AXIS 12 degrees    T Axis 210 degrees    Interpretation ECG       Sinus tachycardia  Cannot rule out Anterior infarct , age undetermined  Abnormal ECG     CBC with platelets differential    Narrative    The following orders were created for panel order CBC with platelets differential.  Procedure                               Abnormality         Status                     ---------                               -----------         ------                     CBC with platelets and d...[[id number]]  Abnormal            Final result                 Please view results for these tests on the individual orders.   INR   Result Value Ref Range    INR 1.21 (H) 0.85 - 1.15   Comprehensive metabolic panel   Result Value Ref Range    Sodium 135 135 - 145 mmol/L    Potassium 2.7 (L) 3.4 - 5.3 mmol/L    Carbon Dioxide (CO2) 27 22  - 29 mmol/L    Anion Gap 12 7 - 15 mmol/L    Urea Nitrogen 11.9 8.0 - 23.0 mg/dL    Creatinine 0.96 (H) 0.51 - 0.95 mg/dL    GFR Estimate 63 >60 mL/min/1.73m2    Calcium 7.6 (L) 8.8 - 10.4 mg/dL    Chloride 96 (L) 98 - 107 mmol/L    Glucose 109 (H) 70 - 99 mg/dL    Alkaline Phosphatase 121 40 - 150 U/L    AST 21 0 - 45 U/L    ALT 12 0 - 50 U/L    Protein Total 4.9 (L) 6.4 - 8.3 g/dL    Albumin 2.3 (L) 3.5 - 5.2 g/dL    Bilirubin Total 0.5 <=1.2 mg/dL   Lactic acid whole blood with 1x repeat in 2 hr when >2   Result Value Ref Range    Lactic Acid, Initial 2.2 (H) 0.7 - 2.0 mmol/L   Troponin T, High Sensitivity   Result Value Ref Range    Troponin T, High Sensitivity 26 (H) <=14 ng/L   CBC with platelets and differential   Result Value Ref Range    WBC Count 8.0 4.0 - 11.0 10e3/uL    RBC Count 2.97 (L) 3.80 - 5.20 10e6/uL    Hemoglobin 8.9 (L) 11.7 - 15.7 g/dL    Hematocrit 27.9 (L) 35.0 - 47.0 %    MCV 94 78 - 100 fL    MCH 30.0 26.5 - 33.0 pg    MCHC 31.9 31.5 - 36.5 g/dL    RDW 18.4 (H) 10.0 - 15.0 %    Platelet Count 184 150 - 450 10e3/uL    % Neutrophils 76 %    % Lymphocytes 14 %    % Monocytes 10 %    % Eosinophils 0 %    % Basophils 0 %    % Immature Granulocytes 1 %    NRBCs per 100 WBC 0 <1 /100    Absolute Neutrophils 6.1 1.6 - 8.3 10e3/uL    Absolute Lymphocytes 1.1 0.8 - 5.3 10e3/uL    Absolute Monocytes 0.8 0.0 - 1.3 10e3/uL    Absolute Eosinophils 0.0 0.0 - 0.7 10e3/uL    Absolute Basophils 0.0 0.0 - 0.2 10e3/uL    Absolute Immature Granulocytes 0.0 <=0.4 10e3/uL    Absolute NRBCs 0.0 10e3/uL   Magnesium   Result Value Ref Range    Magnesium 1.3 (L) 1.7 - 2.3 mg/dL   XR Chest 2 Views    Narrative    Exam: XR CHEST 2 VIEWS, 11/7/2024 10:23 AM    Comparison: Chest radiograph, 9/20/2024    History: dyspnea, near syncope    Findings:  Frontal and lateral upright views of the chest. Right chest  Port-A-Cath with tip in the mid SVC. Midline trachea. Cardiac  silhouette within normal limits. Sharp costophrenic  angles. No  pneumothorax. No focal airspace opacity. Unchanged left basilar  calcified granuloma correlating with CT dated 5/3/2024.       Impression    Impression: No acute cardiopulmonary pathology is identified. Clear  chest.    I have personally reviewed the examination and initial interpretation  and I agree with the findings.    SORAYA STARKEY MD         SYSTEM ID:  [id number]   CT Abdomen Pelvis w Contrast    Narrative    EXAMINATION: CT ABDOMEN PELVIS W CONTRAST, 11/7/2024 12:01 PM    INDICATION: abdominal pain, changes in urinary cath, near syncope;  complex previous uterine cancer hx & surgical hx    COMPARISON: CT abdomen and pelvis 7/21/2024    TECHNIQUE: CT scan of the abdomen and pelvis was performed on  multidetector CT scanner using volumetric acquisition technique and  images were reconstructed in multiple planes with variable thickness  and reviewed on dedicated workstations.     FINDINGS:    Lower thorax: Normal.  Calcified pulmonary nodule in the left lower  lobe.      Liver: Within normal limits. Stable focal hypodensity in the left lobe  of the liver measuring 8 x 7 mm (series 3, image 10), likely hepatic  cyst. No intrahepatic biliary ductal dilation.      Biliary System: Normal gallbladder. No extrahepatic biliary ductal  dilation.    Spleen: Within normal limits. Splenic granulomas.    Pancreas: Within normal limits. No pancreatic ductal dilation.       Adrenal glands: No nodule.    Kidneys: mild hydronephrosis, left greater than right. No obstructing  stone.    Gastrointestinal tract: Postoperative changes of Kiko-en-Y gastric  bypass with small hiatal hernia. Normal caliber small and large loops  of bowel.       Mesentery/peritoneum/retroperitoneum: No free air or fluid.    Lymph nodes: No significant lymphadenopathy.    Vasculature: Normal caliber abdominal aorta.  Major vasculature  appears patent.    Pelvis: Total hysterectomy and bilateral salpingo-oophorectomy.   Decompressed bladder,  with air-fluid level and suprapubic catheter.  The previously noted scattered foci of air along the right posterior  bladder has resolved.    Bones and soft tissues: No acute osseous abnormality. Multilevel  degenerative changes of the lumbar spine, with stable grade 1  anterolisthesis of L3 on L4, and grade 2 anterolisthesis of L4 on L5.  Multilevel vacuum disc phenomena, greatest at L5-S1. Mild  retrolisthesis of L1 on L2. Diffuse soft tissue anasarca. No organized  soft tissue fluid collection.        Impression    IMPRESSION:  1. No acute abnormality in the abdomen and pelvis.  2. Stable postsurgical changes of hysterectomy and bilateral  salpingo-oophorectomy and Kiko-en-Y gastric bypass.  3. Stable mild bilateral hydronephrosis, left greater than right.  4. Diffuse anasarca.    I have personally reviewed the examination and initial interpretation  and I agree with the findings.    SILVIA ALLEN DO         SYSTEM ID:  [id number]   UA with Microscopic reflex to Culture    Specimen: Urine, Clean Catch   Result Value Ref Range    Color Urine Orange (A) Colorless, Straw, Light Yellow, Yellow    Appearance Urine Cloudy (A) Clear    Glucose Urine Negative Negative mg/dL    Bilirubin Urine Negative Negative    Ketones Urine Negative Negative mg/dL    Specific Gravity Urine 1.005 1.003 - 1.035    Blood Urine Large (A) Negative    pH Urine 6.5 5.0 - 7.0    Protein Albumin Urine 50 (A) Negative mg/dL    Urobilinogen Urine Normal Normal, 2.0 mg/dL    Nitrite Urine Positive (A) Negative    Leukocyte Esterase Urine Large (A) Negative    WBC Clumps Urine Present (A) None Seen /HPF    Budding Yeast Urine Few (A) None Seen /HPF    Mucus Urine Present (A) None Seen /LPF    RBC Urine 5 (H) <=2 /HPF    WBC Urine >182 (H) <=5 /HPF    Squamous Epithelials Urine 2 (H) <=1 /HPF    Transitional Epithelials Urine 3 (H) <=1 /HPF    Narrative    Urine Culture ordered based on laboratory criteria   Lactic acid whole blood   Result Value Ref  Range    Lactic Acid 2.7 (H) 0.7 - 2.0 mmol/L   Troponin T, High Sensitivity   Result Value Ref Range    Troponin T, High Sensitivity 22 (H) <=14 ng/L   Lactic acid whole blood   Result Value Ref Range    Lactic Acid 1.4 0.7 - 2.0 mmol/L   Troponin T, High Sensitivity   Result Value Ref Range    Troponin T, High Sensitivity 25 (H) <=14 ng/L           Assessment and Plan:   Assessment: 69 year old female with 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     # SOB, weakness, near syncope  - Episode of this has not recurred, but will continue to monitor   - Consider adding fall precautions  - Consider PT/OT while inpatient   - Troponin trended 22>26>25, stable and similar to previous values in the past  - EKG in the ED reviewed with ED Physician, sinus tach and no concerns   - CXR, CTAP negative for acute pathology     # UTI  Hx/o ESBL urosepsis and bacteremia  - Started on Vancomycin and Zosyn in the ED, will continue on Zosyn only and await urine culture to narrow antibiotic coverage  - Hold chemo. Currently on carboplatin/paclitaxel, s/p cycle #3 of chemo.   - Suprapubic cath care. Shahid exchanged in ED.  - IVF boluses given   - Follow- up urine and blood cultures, consider culture from port   - Trend BMP, CBC, mag, phos tomorrow   - Creatinine slightly elevated at 0.96, continue to trend for MADHU  - Lactic Acidosis on admission has resolved, possibly abnormal in the setting of dehydration  - Currently afebrile and normotensive, continue to monitor.   - CTAP negative for acute pathology   - Consider urology consult, given recurrent UTI     # Serous endometrial adenocarcinoma of uterus  - S/p SNEHA, BSO (7/12/2024) s/p Cycle 3 carbo/taxel (10/15/24), cystotomy s/p suprapubic catheter  - Hold chemo. Currently on  carboplatin/paclitaxel, s/p cycle #3 of chemo.     # CKD stage 3a  - Trend renal function while inpatient  - Avoid nephrotoxic medications     # Abdominal pain, nausea  # Chronic low back pain.  # Chronic low abd pain  -  PRN zofran, prn miralax, senna  - Acetaminophen and oxycodone PRN    # Electrolyte Abnormalities, hypovolemia  - Regular diet  - Encourage PO intake  - Monitor VS and UOP  - Repeat labs in am  - Hypokalemia at 2.7, Will order ERP PRN    - Hypomagnesemia at 1.3, Will order ERP PRN    # Anemia of chronic disease.  - AM labs  - Continue home vitamin    # Chronic coccyx wound  - Present on admission  - WOC consulted    # Chronic stable conditions  - Anemia of chronic disease: hemoglobin 8.9  - Atrial Fibrillation: PTA Eliquis, atenolol  - HTN: PTA atenolol   - Asthma: PTA albuterol   - MDD, Schizophrenia: no issues  - Hx/o gastric bypass      Raina Caruso MD  [facility]  Gynecology Oncology Resident, PGY-1  11/07/2024 7:02 PM    To reach the GYNECOLOGY ONCOLOGY team for this patient, please page [phone]           I did not see the patient on this date.  See progress note from subsequent day for full details.  Dany Perez MD

## 2024-11-08 NOTE — CONSULTS
Urology Consult History and Physical    Name: Alis Hartman    MRN: [id number]   YOB: 1955       We were asked to see Alis Hartman at the request of Dr. Ballard for evaluation and treatment of the following chief complaint.          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  Given the patient's preoperative voiding dysfunction (chronic incontinence with chronic hydronephrosis, and Hx pelvic radiation) and to facilitate bladder healing, Dr. Monroy placed an SPT.  When the patient was last seen in clinic by Dr Monroy on 9/4/24, the patient wanted to keep the SPT given overall improved QOL and monthly exchgs were recommended.  It seems pt was thereafter lost to followup.      Since surgery, she has been on carbo/taxel and completed cycle 3  (10/15/24).  Yesterday she was admitted with weakness, near syncope and dyspnea.  She also states she had pain across her low abd (suprapubic BL) and very cloudy urine.    Vitals normal  Labs with hypokalemia (2.7), hypochloremia (96), hypomagnesemia (1.3), hypophosphatermia (2.4) and elevated lactate (2.7 --> 2.2).  SCr - 0.86mg/dL.  Notably she has no leukocytosis.  UA appeared dirty but UCX grew mixed  elisa.  She is on Zosyn  She did get a CT scan showing stable mild BL hydronephrosis, L>R but otherwise normal. SPT is within the decompressed bladder and there is an air-fluid level.      She has had troubles negotiating SPT exchanges.   "After the 9/4/24 exchg, the SPT wasn't exchanged until last week by a home health RN.  Then it was changed again yesterday on admission.  Pt sts she drinks \"lots\" of water.  And she always uses the large overnight bag, which she hangs lower than her bladder.  Never leaks urine per urethra     UCX History:  10/10/24 - Positive urine culture in the ED on 10/10/24 growing 2 strains of E faecium  (R. Amp) and light candida.  She was given 1 dose of meropenem in the ED for this then a 7d course of Nitrofurantoin  9/21/24 - Bacteremia with K Pneumoniae ESBL, but urine grew mixed elisa  7/29/24 - Urine with no growth  7/21/24 - Urine grew K Pneumoniae ESBL  7/12/24 - Cystotomy with cystorrhaphy and SPT placement   5/23/23 - Urine grew E coli (prior to SPT placement)          Past Medical History:     Past Medical History:   Diagnosis Date    Atrial fibrillation (H)     Depression     Hypertension     Malignant neoplasm of endocervix (H)     Tx with radiation    Near syncope 11/7/2024    Orthopnea 9/21/2024    Other chronic pain     Low back    Schizophrenia (H)     Urinary incontinence             Past Surgical History:     Past Surgical History:   Procedure Laterality Date    ABDOMINOPLASTY      BIOPSY CERVICAL, LOCAL EXCISION, SINGLE/MULTIPLE  10/26/2011    Procedure:BIOPSY CERVICAL, LOCAL EXCISION, SINGLE/MULTIPLE; EUA, cervical biopsies; Surgeon:BETTY TINEO; Location:[facility] OR    BIOPSY VAGINAL N/A 6/8/2021    Procedure: BIOPSY, VAGINA;  Surgeon: Dany Perez MD;  Location: [facility] OR    CYSTOSCOPY N/A 5/17/2024    Procedure: Cystoscopy;  Surgeon: Dany Perez MD;  Location: [facility]    CYSTOSTOMY, INSERT TUBE SUPRAPUBIC, COMBINED  7/12/2024    Procedure: Insertion of supra-pubic catheter;  Surgeon: Dany Perez MD;  [facility]: [facility]    DILATION AND CURETTAGE, WITH ULTRASOUND GUIDANCE N/A 5/17/2024    Procedure: Dilation and curettage of the uterus with drainage of uterine fluid under ultrasound guidance, lysis of " vaginal adhesions;  Surgeon: Dany Perez MD;  Location: [facility]    EXAM UNDER ANESTHESIA PELVIC N/A 3/12/2020    Procedure: Exam under anesthsia, vaginal biopsies, possible CO2 laser of the vagina;  Surgeon: Lilliam Roy MD;  [facility]    GI SURGERY  2004    gastric bypass    HYSTERECTOMY TOTAL ABDOMINAL, BILATERAL SALPINGO-OOPHORECTOMY, COMBINED Bilateral 7/12/2024    Procedure: Diagnostic laparoscopy converted to exploratory laparotomy, total abdominal hysterectomy, bilateral salpingo-oophorectomy, lysis of adhesions >60 min;  Surgeon: Dany Perez MD;  Location: [facility]    INSERT PORT VASCULAR ACCESS N/A 8/26/2024    Procedure: Insert port vascular access;  Surgeon: Avery Gregory MD;  Location: [facility]    IR CHEST PORT PLACEMENT > 5 YRS OF AGE  8/26/2024    LASER CO2 VAGINA N/A 3/2/2015    Procedure: LASER CO2 VAGINA;  Surgeon: Mariela Abdalla MD;  Location: [facility]    LASER CO2 VAGINA N/A 5/24/2019    Procedure: Exam under anesthesia, vaginal biopsies, CO2 laser of the vagina;  Surgeon: Lilliam Roy MD;  Location: [facility]    LASER CO2 VAGINA N/A 6/8/2021    Procedure: Exam under anesthesia, laser ablation of the upper vagina;  Surgeon: Dany Perez MD;  Location: [facility]    REPAIR BLADDER N/A 7/12/2024    Procedure: Repair bladder;  Surgeon: Dany Perez MD;  Location: [facility]            Social History:     Social History     Tobacco Use    Smoking status: Never    Smokeless tobacco: Never   Substance Use Topics    Alcohol use: Not Currently            Family History:     Family History   Problem Relation Age of Onset    Heart Disease Father     Heart Failure Father     No Known Problems Brother     No Known Problems Brother     No Known Problems Brother     Pancreatitis Brother     Hypertension Sister     Peripheral Vascular Disease Sister     No Known Problems Sister     No Known Problems Son     No Known Problems Daughter             Allergies:     Allergies    Allergen Reactions    Ibuprofen Nausea and Vomiting    Shrimp     Sulfa Antibiotics Rash     Patient started on bactrim 7/24/24 tolerating medication without rash on 7/25             Medications:     Current Facility-Administered Medications   Medication Dose Route Frequency Provider Last Rate Last Admin    acetaminophen (TYLENOL) tablet 650 mg  650 mg Oral Q6H PRN Kelsey Mccracken APRN CNP        albuterol (PROVENTIL HFA/VENTOLIN HFA) inhaler  1-2 puff Inhalation Q4H PRN Kelsey Mccracken APRN CNP        apixaban ANTICOAGULANT (ELIQUIS) tablet 2.5 mg  2.5 mg Oral BID Kelsey Mccracken APRN CNP   2.5 mg at 11/07/24 1936    atenolol (TENORMIN) half-tab 12.5 mg  12.5 mg Oral Daily Kelsey Mccracken APRN CNP        diclofenac (VOLTAREN) 1 % topical gel 4 g  4 g Topical 4x Daily PRN Kelsey Mccracken APRN CNP        ferrous sulfate solution 15 mg  15 mg Oral Daily Kelsey Mccracken APRN CNP        lidocaine (LMX4) cream   Topical Q1H PRN Kelsey Mccracken APRN CNP        lidocaine 1 % 0.1-1 mL  0.1-1 mL Other Q1H PRN Kelsey Mccracken APRN CNP        ondansetron (ZOFRAN) tablet 8 mg  8 mg Oral Q8H PRN Kelsey Mccracken APRN CNP        oxyCODONE (ROXICODONE) tablet 5-10 mg  5-10 mg Oral Q4H PRN Kelsey Mccracken APRN CNP   10 mg at 11/08/24 0126    Patient is already receiving anticoagulation with heparin, enoxaparin (LOVENOX), warfarin (COUMADIN)  or other anticoagulant medication   Does not apply Continuous PRN Kelsey Mccracken APRN CNP        piperacillin-tazobactam (ZOSYN) intermittent infusion 3.375 g  3.375 g Intravenous Q6H Kelsey Mccracken APRN  mL/hr at 11/08/24 0630 3.375 g at 11/08/24 0630    polyethylene glycol (MIRALAX) Packet 17 g  17 g Oral Daily PRN Kelsey Mccracken, APRN CNP        prochlorperazine (COMPAZINE) tablet 5-10 mg  5-10 mg Oral Q6H Kelsey Oakes APRN CNP        senna-docusate (SENOKOT-S/PERICOLACE) 8.6-50 MG per tablet 1 tablet  1 tablet Oral BID  PRN Kelsey Mccracken APRN CNP        sodium chloride (PF) 0.9% PF flush 3 mL  3 mL Intracatheter Q8H Kelsey Mccracken APRN CNP   3 mL at 11/08/24 0121    sodium chloride (PF) 0.9% PF flush 3 mL  3 mL Intracatheter q1 min prn Kelsey Mccracken APRN CNP         Current Outpatient Medications   Medication Sig Dispense Refill    acetaminophen (TYLENOL) 325 MG tablet Take 2 tablets (650 mg) by mouth every 6 hours as needed for mild pain 24 tablet 0    albuterol (PROAIR HFA/PROVENTIL HFA/VENTOLIN HFA) 108 (90 Base) MCG/ACT inhaler Inhale 1-2 puffs into the lungs every 4 hours as needed for shortness of breath      apixaban ANTICOAGULANT (ELIQUIS) 2.5 MG tablet Take 1 tablet (2.5 mg) by mouth 2 times daily. 60 tablet 3    atenolol (TENORMIN) 25 MG tablet Take 0.5 tablets (12.5 mg) by mouth daily. 30 tablet 3    calcium Citrate-vitamin D 500-400 MG-UNIT CHEW Take 1 tablet by mouth daily      childrens multivitamin w/iron (FLINTSTONES COMPLETE) 60 MG chewable tablet Take 2 tablets by mouth daily      cholecalciferol 125 MCG (5000 UT) CAPS Take 5000 mg 4 times a week      cyanocobalamin (CYANOCOBALAMIN) 1000 MCG/ML injection Inject 1 mL (1,000 mcg) into a muscle every month.      diclofenac (VOLTAREN) 1 % topical gel Apply to: Skin on  Both/All Knee for pain. Topical 1% gel, 4 g applied TOPICALLY to lower extremities 3 times daily PRN ;      ferrous sulfate (CASSANDRA-IN-SOL) 75 (15 FE) MG/ML oral drops Take 15 mg by mouth daily      hydrocortisone 2.5 % cream Apply topically as needed      lidocaine (XYLOCAINE) 5 % external ointment Apply 1 Application topically as needed      naloxone (NARCAN) 4 MG/0.1ML nasal spray Spray 1 spray (4 mg) into one nostril alternating nostrils as needed for opioid reversal every 2-3 minutes until assistance arrives 2 each 0    ondansetron (ZOFRAN) 8 MG tablet Take 1 tablet (8 mg) by mouth every 8 hours as needed for nausea 20 tablet 0    oxyCODONE (ROXICODONE) 5 MG tablet Take 1 tablet (5  mg) by mouth every 6 hours as needed for breakthrough pain 30 tablet 0    polyethylene glycol (MIRALAX) 17 GM/Dose powder Take 17 g by mouth daily as needed for constipation 510 g 0    senna-docusate (SENOKOT-S/PERICOLACE) 8.6-50 MG tablet Take 1 tablet by mouth 2 times daily 60 tablet 0    triamcinolone (KENALOG) 0.1 % external ointment Apply topically 3 times daily as needed for irritation. 30 g 1    zolpidem (AMBIEN) 10 MG tablet Take 10 mg by mouth nightly as needed      dexAMETHasone (DECADRON) 4 MG tablet Take 2 tablets (8 mg) by mouth daily (with breakfast). Take daily x 3 days following chemo 6 tablet 11    prochlorperazine (COMPAZINE) 5 MG tablet Take 1-2 tablets (5-10 mg) by mouth every 6 hours as needed for nausea or vomiting 40 tablet 0             Review of Systems:   ROS: See HPI for pertinent details.  Remainder of 10-point ROS negative.  As above in HPI          Physical Exam:   VS:  T: 97.6    HR: 86    BP: 123/88    RR: 15   GEN:  AOx3.  NAD.  Pleasant.  HEENT:  Sclerae anicteric.  Conjunctivae pink.  Moist mucous membranes  NECK:  Supple.  No lymphadenopathy.  LUNGS: Non-labored breathing.  BACK:  No costoverterbral tenderness BL.  ABD:  Soft.  NT.  ND.  No rebound or guarding.  + surgical scars well healed. No masses.    SPT site is clean, dry without erythema or disharge.    Urine is yellow but cloudy    EXT:  Warm, well perfused.    SKIN:  Warm.  Dry.  No rashes.          Data:   All laboratory data reviewed:    Recent Labs   Lab 11/08/24  0651 11/07/24  1005   WBC 6.5 8.0   HGB 8.5* 8.9*    184       Recent Labs   Lab 11/08/24  0651 11/07/24  1005   * 135   POTASSIUM 3.1* 2.7*   CHLORIDE 96* 96*   CO2 28 27   BUN 9.7 11.9   CR 0.86 0.96*   GLC 92 109*   SAMUEL 7.8* 7.6*   MAG 2.2 1.3*   PHOS 2.4*  --        Recent Labs   Lab 11/07/24  1209   COLOR Orange*   APPEARANCE Cloudy*   URINEGLC Negative   URINEBILI Negative   URINEKETONE Negative   SG 1.005   URINEPH 6.5   PROTEIN 50*  "  NITRITE Positive*   LEUKEST Large*   RBCU 5*   WBCU >182*     Results for orders placed or performed during the hospital encounter of 11/07/24   Urine Culture    Collection Time: 11/07/24 12:09 PM    Specimen: Urine, Clean Catch   Result Value Ref Range    Culture >100,000 CFU/mL Mixture of Urogenital Elisa    Results for orders placed or performed during the hospital encounter of 10/10/24   Urine Culture    Collection Time: 10/10/24 10:49 AM    Specimen: Urine, Catheter   Result Value Ref Range    Culture 50,000-100,000 CFU/mL Enterococcus faecium (A)     Culture 50,000-100,000 CFU/mL Enterococcus faecium (A)     Culture <1,000 CFU/mL Urogenital elisa     Culture <10,000 CFU/mL Candida albicans (A)        Susceptibility    Enterococcus faecium - KAYLAN*     Ampicillin >=32 Resistant ug/mL     Linezolid 2 Susceptible ug/mL     Vancomycin <=0.5 Susceptible ug/mL     Nitrofurantoin 32 Susceptible ug/mL    Enterococcus faecium - KAYLAN*     Ampicillin >=32 Resistant ug/mL     Linezolid 2 Susceptible ug/mL     Vancomycin <=0.5 Susceptible ug/mL     Nitrofurantoin <=16 Susceptible ug/mL     * Antibiotics listed as \"No Interpretation\" have no regulatory guidelines for susceptibility/resistance available.     Antibiotics listed as \"No Interpretation\" have no regulatory guidelines for susceptibility/resistance available.       All pertinent imaging reviewed:  EXAMINATION: CT ABDOMEN PELVIS W CONTRAST, 11/7/2024 12:01 PM     INDICATION: abdominal pain, changes in urinary cath, near syncope;  complex previous uterine cancer hx & surgical hx     COMPARISON: CT abdomen and pelvis 7/21/2024     TECHNIQUE: CT scan of the abdomen and pelvis was performed on  multidetector CT scanner using volumetric acquisition technique and  images were reconstructed in multiple planes with variable thickness  and reviewed on dedicated workstations.      FINDINGS:     Lower thorax: Normal.  Calcified pulmonary nodule in the left lower  lobe.     "   Liver: Within normal limits. Stable focal hypodensity in the left lobe  of the liver measuring 8 x 7 mm (series 3, image 10), likely hepatic  cyst. No intrahepatic biliary ductal dilation.       Biliary System: Normal gallbladder. No extrahepatic biliary ductal  dilation.     Spleen: Within normal limits. Splenic granulomas.  Pancreas: Within normal limits. No pancreatic ductal dilation.     Adrenal glands: No nodule.     Kidneys: mild hydronephrosis, left greater than right. No obstructing  stone.     Gastrointestinal tract: Postoperative changes of Kiko-en-Y gastric  bypass with small hiatal hernia. Normal caliber small and large loops  of bowel.        Mesentery/peritoneum/retroperitoneum: No free air or fluid.  Lymph nodes: No significant lymphadenopathy.     Vasculature: Normal caliber abdominal aorta.  Major vasculature  appears patent.     Pelvis: Total hysterectomy and bilateral salpingo-oophorectomy.   Decompressed bladder, with air-fluid level and suprapubic catheter.  The previously noted scattered foci of air along the right posterior  bladder has resolved.     Bones and soft tissues: No acute osseous abnormality. Multilevel  degenerative changes of the lumbar spine, with stable grade 1  anterolisthesis of L3 on L4, and grade 2 anterolisthesis of L4 on L5.  Multilevel vacuum disc phenomena, greatest at L5-S1. Mild  retrolisthesis of L1 on L2. Diffuse soft tissue anasarca. No organized  soft tissue fluid collection.                                                                       IMPRESSION:  1. No acute abnormality in the abdomen and pelvis.  2. Stable postsurgical changes of hysterectomy and bilateral  salpingo-oophorectomy and Kiko-en-Y gastric bypass.  3. Stable mild bilateral hydronephrosis, left greater than right.  4. Diffuse anasarca.         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      Since that time, the patient has had three significant UTIs, one with bacteremia.        Discussed hygiene and plan with Aranza:  - The SPT should get changed monthly.  I will arrange an appt in clinic  in 1 month to get this done.   - Keep the skin around the suprapubic tube clean.  Keep the tube clean too (ok to bring the tube and bag into the shower)   - Continue drinking plenty of fluids  - Consider adding cranberry tablets (can be purchased over-the counter at the pharmacy)   - Discussed that the Shahid bag should always be hanging lower than her bladder.  And it should be emptied before it gets full.  Reflux of dirty urine from the bag into the bladder can cause UTIs  - Debris in the urine can contribute to symptomatic UTIs.  Pt has support at home and would be open to learning how to flush the bladder.  Will place orders for the nurse to teach flushing.  Will also ask our clinic to order flushing supplies for her (so they can be delivered to her home).  Furthermore, will place flushing instructions below and into the discharge navigator.    - Will ask our coordinators to set up a urology appointment for her in 3 months to see how she is doing.    - If she continues to have infections could consider cystoscopy, although this would likely be low yield.  UTIs are likely due to chronic Shahid, delayed exchanges, radiation effects on bladder (leading to poor tissue health)     ---------------------------------------------------------------------------------------------------------------------  INSTRUCTIONS:  BLADDER IRRIGATIONS:  Perform bladder irrigations through the suprapubic catheter every other day.  To do this:  1) Disconnect the suprapubic from the drainage tubing.  Use  an alcohol swab to clean the end of the Shahid.  Use a 60cc catheter tipped syringe to push 60cc sterile water or normal saline into the bladder then withdraw the fluid to remove debris.  If there is considerable debris, continue flushing until irrigation is clear.   Usually this will require between 60 and 120 mL of total irrigation to clear the debris.  Once finished, disconnect the syringe from the Shahid catheter and use an alcohol wipe to clean the end of the Shahid catheter and the end of the drainage tubing before reconnecting them.      CATHETER IRRIGATION SUPPLIES: Catheter irrigation syringes and trays may be reused.  They should be washed in warm soapy water and thoroughly rinsed after each use. They can be air-dried on a clean towel.    DISCHARGE SUPPLIES:  1) Suprapubic tube (Shahid catheter) instructions  2) Shahid secures, bags  3) Three bladder irrigation trays  4) normal saline (or sterile water) bottles to get started)   ---------------------------------------------------------------------------------------------------------------------    Urology will sign off    Thank you for the opportunity to participate in the care of [facility].     Tiana Campbell PA-C  Urology Physician Assistant  Personal Pager: [phone]    Please call Job Code:   [phone] to reach the Urology resident or PA on call - Weekdays  [phone] to reach the Urology resident or PA on call - Weeknights and weekends

## 2024-11-08 NOTE — PROGRESS NOTES
11/08/24 1100   Appointment Info   Signing Clinician's Name / Credentials (OT) Melissa Barahona OTR/L   Rehab Comments (OT) ED Eval   Living Environment   People in Home child(vernell), adult   Current Living Arrangements apartment   Home Accessibility stairs to enter home   Number of Stairs, Main Entrance 6   Stair Railings, Main Entrance railings safe and in good condition   Transportation Anticipated family or friend will provide   Living Environment Comments Pt reports living in apartment with her adult kids. Home has a tub/shower with tub bench.   Self-Care   Usual Activity Tolerance moderate   Current Activity Tolerance fair   Equipment Currently Used at Home commode chair;shower chair;walker, rolling   Fall history within last six months no   Activity/Exercise/Self-Care Comment Pt reports PLOF as mod I with ADLs but does ave her kids help as needed.   General Information   Onset of Illness/Injury or Date of Surgery 11/07/24   Referring Physician Raina Caruso MD   Patient/Family Therapy Goal Statement (OT) Return home   Additional Occupational Profile Info/Pertinent History of Current Problem Alis Hartman is a 69 year old female with serous endometrial adenocarcinoma of uterus s/p SNEHA, BSO (7/12/2024) s/p Cycle 3 carbo/taxel (10/15/24), cystotomy s/p suprapubic catheter w/ hx of ESBL urosepsis & bacteremia, A-fib on eliquis, HTN, CKD stage 3a, and anemia who presents to the emergency department for weakness, near syncope, and dyspnea.   Existing Precautions/Restrictions fall   Limitations/Impairments safety/cognitive   Left Upper Extremity (Weight-bearing Status) full weight-bearing (FWB)   Right Upper Extremity (Weight-bearing Status) full weight-bearing (FWB)   Left Lower Extremity (Weight-bearing Status) full weight-bearing (FWB)   Right Lower Extremity (Weight-bearing Status) full weight-bearing (FWB)   Cognitive Status Examination   Orientation Status orientation to person, place and time    Visual Perception   Visual Impairment/Limitations WNL   Sensory   Sensory Quick Adds sensation intact   Posture   Posture forward head position   Range of Motion Comprehensive   General Range of Motion no range of motion deficits identified   Strength Comprehensive (MMT)   General Manual Muscle Testing (MMT) Assessment no strength deficits identified   Coordination   Upper Extremity Coordination No deficits were identified   Bed Mobility   Bed Mobility supine-sit;sit-supine   Comment (Bed Mobility) min-mod A   Transfers   Transfers sit-stand transfer;toilet transfer   Transfer Comments min A   Balance   Balance Assessment standing dynamic balance;sitting dynamic balance   Balance Comments CGA   Activities of Daily Living   BADL Assessment/Intervention toileting;lower body dressing;bathing   Bathing Assessment/Intervention   [place] Level (Bathing) minimum assist (75% patient effort)   Comment, (Bathing) Per clinical judgement   Lower Body Dressing Assessment/Training   Comment, (Lower Body Dressing) Per clinical judgement   [place] Level (Lower Body Dressing) minimum assist (75% patient effort)   Toileting   Comment, (Toileting) Per clinical judgement   [place] Level (Toileting) maximum assist (25% patient effort)   Clinical Impression   Criteria for Skilled Therapeutic Interventions Met (OT) Yes, treatment indicated   OT Diagnosis Decreased ADL function   OT Problem List-Impairments impacting ADL problems related to;activity tolerance impaired;balance;strength   Assessment of Occupational Performance 3-5 Performance Deficits   Planned Therapy Interventions (OT) ADL retraining;IADL retraining;strengthening;transfer training;home program guidelines;progressive activity/exercise   Clinical Decision Making Complexity (OT) detailed assessment/moderate complexity   Risk & Benefits of therapy have been explained evaluation/treatment results reviewed;care plan/treatment goals reviewed;risks/benefits  reviewed;current/potential barriers reviewed;participants voiced agreement with care plan;participants included;patient   Clinical Impression Comments Patient currently below baseline and will benefit from skilled OT throughout hospital stay to promote return to functional baseline and assist with discharge planning.   OT Total Evaluation Time   OT Eval, Moderate Complexity Minutes (70253) 8   OT Goals   Therapy Frequency (OT) 5 times/week   OT Predicted Duration/Target Date for Goal Attainment 11/29/24   OT Goals Lower Body Dressing;Lower Body Bathing;Toilet Transfer/Toileting;Home Management   OT: Lower Body Dressing Modified independent   OT: Lower Body Bathing Modified independent   OT: Toilet Transfer/Toileting Modified independent   OT: Home Management Modified independent   OT Discharge Planning   OT Plan Standing ADLs, dressing, functional cares   OT Discharge Recommendation (DC Rec) Transitional Care Facility;home with assist   OT Rationale for DC Rec Patient below baseline currently limited by deconditioning and fatigue.  TCU rec at this time due to patient needing increased assist with cares and mobility.  Pending length of stay patient may progress to home with assist.  Will continue to assess.   OT Brief overview of current status Min a bed mobility, max a toileting.

## 2024-11-08 NOTE — PROGRESS NOTES
Care Management Follow Up    Length of Stay (days): 0    Expected Discharge Date: 11/10/2024     Concerns to be Addressed:       Patient plan of care discussed at interdisciplinary rounds: No    Anticipated Discharge Disposition:                Anticipated Discharge Services:    Anticipated Discharge DME:      Patient/family educated on Medicare website which has current facility and service quality ratings:    Education Provided on the Discharge Plan:    Patient/Family in Agreement with the Plan:      Referrals Placed by CM/SW:    Private pay costs discussed: Not applicable    Discussed  Partnership in Safe Discharge Planning  document with patient/family: No     Handoff Completed: No, handoff not indicated or clinically appropriate    Additional Information:  RNCC updated by provider that patient needing follow up with HC agency for suprapubic catheter exchanges (monthly need for chronic indwelling SPT). RNCC found HC agency listed in Epic chart review: [facility], called them, confirmed services PT/OT/RN for resumption and updated on need for monthly catheter exchanges and following to see pt gets proper cares. They will follow in Epic for orders, plan for discharge today.     Next Steps: RNCC available while pt admitted to hospital should pt have further needs.    [facility] (Home Health Agency)  Ph: [phone]  Fax: [phone]  Has Epic, resumption HC PT/OT/RN    David Escobar BSN, BA, RN, CMSRN, RNCC  [facility]  Covering Units [address]/OBS  Phone: [phone]  Available on Vocera search 6C 6502-14 RNCC or OBS RNCC  After Hours [phone]     [address] Ph: [phone]     6B/CRNCC Weekend/holiday on Vocera or [phone]     [facility] [place] ED/5 Ortho/5 Med/Surg [phone]     [facility] [place] [facility] 6 Med/Surg 8A, [phone]     Observation SW and weekend/after hours phone: [phone]

## 2024-11-08 NOTE — PROGRESS NOTES
Gynecology Oncology Progress Note  11/07/24    HD#2 SOB, weakness, near syncope, UTI     Disease: Serous endometrial adenocarcinoma of uterus. S/p SNEHA, BSO, cystotomy s/p suprapubic catheter (7/12), C4 carbo/taxel (11/5)    24 hour events:   - NAEON     Subjective: Patient is doing well this morning.  Was not able to get you much sleep overnight due to feeling cold at night.  Denies fevers or malaise.  Having some mild abdominal pain well-controlled of p.o. medications.  Mostly having pain along the wound on her coccyx.  Has a home care nurse that typically takes care of it, however has been causing her significant discomfort over the last 24 hours.  Otherwise, tolerating PO, passing flatus, voiding spontaneously, and ambulating without issues. Denies chest pain, SOB, nausea/vomiting, fevers/chills, dizziness.    Objective:   Patient Vitals for the past 24 hrs:   BP Temp Temp src Pulse Resp SpO2   11/08/24 0600 123/88 -- -- 86 -- --   11/08/24 0500 102/72 -- -- 87 -- --   11/08/24 0400 92/78 -- -- 120 -- --   11/08/24 0300 104/69 -- -- 83 -- --   11/08/24 0200 102/75 -- -- 87 -- --   11/08/24 0100 105/68 -- -- 83 -- --   11/08/24 0000 123/82 -- -- 92 -- --   11/07/24 2200 113/67 -- -- 82 -- --   11/07/24 2100 121/82 -- -- 81 -- --   11/07/24 2030 123/79 -- -- 97 -- --   11/07/24 1900 111/71 -- -- 83 -- --   11/07/24 1700 131/79 -- -- 80 -- --   11/07/24 1500 112/80 -- -- 93 -- --   11/07/24 1300 122/84 -- -- 87 -- --   11/07/24 1223 134/84 -- -- 85 -- --   11/07/24 1105 -- -- -- -- -- 100 %   11/07/24 1037 106/69 -- -- 95 -- --   11/07/24 0923 102/82 97.6  F (36.4  C) Oral 52 15 --       General: NAD, appears comfortable in bed  CV: Well-perfused  Resp: Normal work of breathing  Abdomen: soft, mildly tender, non-distended, suprapubic catheter in place  Skin: chronic coccyx wound with overlying dressing   Extremities: warm, well-perfused, nontender, +1 edema    Intake/Output    Intake/Output Summary (Last 24 hours)  at 11/8/2024 0612  Last data filed at 11/8/2024 0200  Gross per 24 hour   Intake 100 ml   Output --   Net 100 ml      Assessment: 69 year old female with serous endometrial adenocarcinoma of uterus s/p SNEHA, BSO (7/12/2024) s/p Cycle 3 carbo/taxel (10/15/24), cystotomy s/p suprapubic catheter w/ hx of ESBL urosepsis & bacteremia, A-fib on eliquis, HTN, CKD stage 3a, and anemia.  Currently HD#2 due to concerns for urinary tract infection.     # SOB, weakness, near syncope, resolved  Symptoms improved upon arrival to the emergency department yesterday.  Has not experienced another episode since admission.  Cardiovascular workup (including checks x-ray, EKG, and troponin) overall unremarkable.  Did have elevated troponin (trended 22>26>25), however these have been stable over serial assessments.   - Fall precautions as needed   - Will order PT/OT consultation while inpatient    # Recurrent UTI  # Hx of ESBL urosepsis & bacteremia  UA notable for large leuk esterases and positive nitrite. Started on Vancomycin and Zosyn in the ED. Now soley on Zosyn. Awaiting urine culture to narrow antibiotic coverage. Additionally, CTAP negative for acute pathology. Noted to have a lactic Acidosis on admission has resolved, possibly abnormal in the setting of dehydration.   - Follow up urine and blood cultures.  Titrate antibiotics appropriately  - AM BMP, CBC, mag, phos pending  - Will place urology consultation this morning.  Appreciate recommendations     # CKD Stage 3a  Creatinine slightly elevated at 0.96, however no concerns for MADHU at this time.   - Trend renal function while inpatient  - Avoid nephrotoxic medications      # Abdominal pain, nausea  -  PRN zofran, prn miralax, senna     # Electrolyte Abnormalities  - Hypokalemia at 2.7, Will order ERP PRN    - Hypomagnesemia at 1.3, Will order ERP PRN     # Chronic coccyx wound  - Present on admission  - WOC consulted     # Chronic stable conditions  - Anemia of chronic disease:  hemoglobin 8.9  - Atrial Fibrillation: PTA Eliquis,   - HTN: PTA atenolol   - Asthma: PTA albuterol   - MDD      Anselmo Ballard MD, MPH, MS  Obstetrics, Gynecology & Women's Health   Resident, PGY-3  11/08/2024 7:12 AM    Gynecologic Oncology Attending Attestation  I saw the patient after the team.  She is resting comfortably.  Urine culture has returned with mixed genital elisa without a predominant organism.  This was discussed with urology who said we could consider treatment with Bactrim or other agent to cover bacteria found in recent cultures versus asking lab to try to grow out speciate the predominant elisa.  Overall the patient is looking better so we will plan to treat with Bactrim.  I did discuss the physical therapy recommendations for TCU at discharge.  The patient declines this recommendation as she reports her family has been staying with her 24/7 and she prefers to return home.  She is interested in finding out what type of assistive devices she might qualify for to better assist her with mobility at home.  I also discussed with her risk benefits of continuing with chemotherapy.  We discussed adding IV fluid infusions as well as labs and electrolyte replacement to her treatment plan.  We will need to consider transportation as this has been a challenge for the patient in the past.  I reiterated with her the contributory factors to recurrent urinary tract infections related to her suprapubic catheter.  These are detailed in the urology note.  She will remain admitted overnight we will readdress discharge planning with physical therapy tomorrow.  I have discussed the patient and plan of care with the resident as documented in the note above, which I have edited as necessary.    Dany Perez MD    Gynecologic Oncology

## 2024-11-08 NOTE — PLAN OF CARE
Goal Outcome Evaluation:    BP 94/62   Pulse 68   Temp 97.5  F (36.4  C) (Oral)   Resp 18   SpO2 100%     Pain: Acceptable level with current regimen   Neuro: A&Ox4.   Cardiac: SR. VSS.   Respiratory: Sating 100% on RA.  GI/: Adequate urine output via suprapubic catheter. No BM since admission  Diet/appetite: Tolerating regular diet but appetite decreased and preference for hospital food not great.  Activity:  Stand and pivot   Skin: No new deficits noted.  LDA's: Right chest port saline locked. Suprapubic cath with gauze at site and down drain bag collection device. Wound on sacrum addressed and cares done by [facility] 11/8/24.    Plan: Continue with POC. Notify primary team with changes.        Plan of Care Reviewed With: patient    Overall Patient Progress: no change

## 2024-11-08 NOTE — PROGRESS NOTES
Brief Progress Note     Received update regarding patient from bedside RN. Doing well this evening. Received IV antibiotics. Only required one dose IV pain medications overnight. Was able to eat a sandwich. Sleeping soundly at this time. No other concerns.     O: /82   Pulse 81   Temp 97.6  F (36.4  C) (Oral)   Resp 15   SpO2 100%          Anselmo Ballard MD, MPH, MS   [facility]  Gynecology Oncology Resident, PGY-3  11/07/2024 9:53 PM    To reach the GYNECOLOGY ONCOLOGY team for this patient, please page [phone]

## 2024-11-08 NOTE — DISCHARGE SUMMARY
"Gynecologic Oncology Discharge Summary    Alis Hartman  [id number]    Admit Date: 11/7/2024  Discharge Date: 11/10/24    Admitting Provider: Dany Perez  Discharge Provider: Dany Perez    Admission Dx:   -UTI  -hx of ESBL urosepsis & bacteremia  -A-fib on eliquis  -HTN  -CKD stage 3a  -Anemia     Discharge Dx:  - Same as above   - Deconditioning  - Hypokalemia  - Hypomagnesemia  - chronic coccyx wound      Patient Active Problem List   Diagnosis    HSIL on Pap smear    Cervical cancer (H)    History of urinary tract infection    Vaginal dysplasia    VAIN III (vaginal intraepithelial neoplasia III)    NO SHOW    Carpal tunnel syndrome    Chronic low back pain    Gastric bypass status for obesity    Generalized anxiety disorder    Iron deficiency anemia    Knee pain    Opiate dependence, continuous (H)    Obesity    Osteoarthrosis    Paranoid schizophrenia, chronic condition (H)    Peripheral edema    Vitamin B12 deficiency    Vitamin D deficiency    Stage 3a chronic kidney disease (H)    Anemia due to chronic kidney disease    Thrombocytosis    Benign essential hypertension    Pre-operative cardiovascular examination    Paroxysmal atrial fibrillation (H)    S/P hysterectomy    Lower abdominal pain    Acute cystitis without hematuria    Dysarthria    MADHU (acute kidney injury) (H)    AMS (altered mental status)    Serous adenocarcinoma of uterus (H)    Port-A-Cath in place    Orthopnea    Hyponatremia    Bilateral lower extremity edema    Urinary tract infection associated with cystostomy catheter, initial encounter (H)    Nausea    Suprapubic catheter (H)    Acute cystitis with hematuria    Hypokalemia    Hypomagnesemia    Near syncope     Procedures: None     Prior to Admission Medications:  (Not in a hospital admission)    Discharge Medications:     Review of your medicines        START taking        Dose / Directions   InterDry 10\"x36\" Shee  Used for: Serous adenocarcinoma of uterus (H), Suprapubic " catheter (H)      Dose: 10 each  Externally apply 10 each topically every 24 hours. Apply to skin folds on abdomen  Quantity: 10 each  Refills: 1     sulfamethoxazole-trimethoprim 800-160 MG tablet  Commonly known as: BACTRIM DS  Indication: Urinary Tract Infection  Used for: Serous adenocarcinoma of uterus (H), Suprapubic catheter (H)      Dose: 1 tablet  Take 1 tablet by mouth 2 times daily for 7 days.  Quantity: 14 tablet  Refills: 0            CONTINUE these medicines which have NOT CHANGED        Dose / Directions   acetaminophen 325 MG tablet  Commonly known as: TYLENOL  Used for: Malignant neoplasm of overlapping sites of cervix (H)      Dose: 650 mg  Take 2 tablets (650 mg) by mouth every 6 hours as needed for mild pain  Quantity: 24 tablet  Refills: 0     albuterol 108 (90 Base) MCG/ACT inhaler  Commonly known as: PROAIR HFA/PROVENTIL HFA/VENTOLIN HFA      Dose: 1-2 puff  Inhale 1-2 puffs into the lungs every 4 hours as needed for shortness of breath  Refills: 0     apixaban ANTICOAGULANT 2.5 MG tablet  Commonly known as: ELIQUIS  Used for: Paroxysmal atrial fibrillation (H)      Dose: 2.5 mg  Take 1 tablet (2.5 mg) by mouth 2 times daily.  Quantity: 60 tablet  Refills: 3     atenolol 25 MG tablet  Commonly known as: TENORMIN  Used for: Paroxysmal atrial fibrillation (H)      Dose: 12.5 mg  Take 0.5 tablets (12.5 mg) by mouth daily.  Quantity: 30 tablet  Refills: 3     calcium Citrate-vitamin D 500-400 MG-UNIT Chew      Dose: 1 tablet  Take 1 tablet by mouth daily  Refills: 0     childrens multivitamin w/iron 60 MG chewable tablet      Dose: 2 tablet  Take 2 tablets by mouth daily  Refills: 0     cholecalciferol 125 MCG (5000 UT) Caps      Take 5000 mg 4 times a week  Refills: 0     cyanocobalamin 1000 mcg/mL injection  Commonly known as: CYANOCOBALAMIN      Inject 1 mL (1,000 mcg) into a muscle every month.  Refills: 0     dexAMETHasone 4 MG tablet  Commonly known as: DECADRON  Used for: Carcinosarcoma of  body of uterus (H)      Dose: 8 mg  Take 2 tablets (8 mg) by mouth daily (with breakfast). Take daily x 3 days following chemo  Quantity: 6 tablet  Refills: 11     diclofenac 1 % topical gel  Commonly known as: VOLTAREN      Apply to: Skin on  Both/All Knee for pain. Topical 1% gel, 4 g applied TOPICALLY to lower extremities 3 times daily PRN ;  Refills: 0     ferrous sulfate 75 (15 FE) MG/ML oral drops  Commonly known as: CASSANDRA-IN-SOL      Dose: 15 mg  Take 15 mg by mouth daily  Refills: 0     hydrocortisone 2.5 % cream      Apply topically as needed  Refills: 0     lidocaine 5 % external ointment  Commonly known as: XYLOCAINE      Dose: 1 Application.  Apply 1 Application topically as needed  Refills: 0     naloxone 4 MG/0.1ML nasal spray  Commonly known as: NARCAN  Used for: Malignant neoplasm of overlapping sites of cervix (H)      Dose: 4 mg  Spray 1 spray (4 mg) into one nostril alternating nostrils as needed for opioid reversal every 2-3 minutes until assistance arrives  Quantity: 2 each  Refills: 0     ondansetron 8 MG tablet  Commonly known as: ZOFRAN  Used for: Carcinosarcoma of body of uterus (H)      Dose: 8 mg  Take 1 tablet (8 mg) by mouth every 8 hours as needed for nausea  Quantity: 20 tablet  Refills: 0     oxyCODONE 5 MG tablet  Commonly known as: ROXICODONE  Used for: S/P hysterectomy      Dose: 5 mg  Take 1 tablet (5 mg) by mouth every 6 hours as needed for breakthrough pain  Quantity: 30 tablet  Refills: 0     polyethylene glycol 17 GM/Dose powder  Commonly known as: MIRALAX  Used for: S/P hysterectomy      Dose: 17 g  Take 17 g by mouth daily as needed for constipation  Quantity: 510 g  Refills: 0     prochlorperazine 5 MG tablet  Commonly known as: COMPAZINE  Used for: Carcinosarcoma of body of uterus (H)      Dose: 5-10 mg  Take 1-2 tablets (5-10 mg) by mouth every 6 hours as needed for nausea or vomiting  Quantity: 40 tablet  Refills: 0     senna-docusate 8.6-50 MG tablet  Commonly known as:  "SENOKOT-S/PERICOLACE  Used for: S/P hysterectomy      Dose: 1 tablet  Take 1 tablet by mouth 2 times daily  Quantity: 60 tablet  Refills: 0     triamcinolone 0.1 % external ointment  Commonly known as: KENALOG  Used for: Skin irritation      Apply topically 3 times daily as needed for irritation.  Quantity: 30 g  Refills: 1     zolpidem 10 MG tablet  Commonly known as: AMBIEN      Dose: 10 mg  Take 10 mg by mouth nightly as needed  Refills: 0               Where to get your medicines        These medications were sent to [id number] - [place], [place] - [address] [facility]  [address], [facility] [address]      Phone: [phone]   InterDry 10\"x36\" Shee  sulfamethoxazole-trimethoprim 800-160 MG tablet       Consultations:   SW, WOC, Urology      Brief History of Illness:  Alis Hartman is a 69 year old female with serous endometrial adenocarcinoma of uterus s/p SNEHA, BSO (7/12/2024) s/p Cycle 3 carbo/taxel (10/15/24), cystotomy s/p suprapubic catheter w/ hx of ESBL urosepsis & bacteremia, A-fib on eliquis, HTN, CKD stage 3a, and anemia who presents to the emergency department for weakness, near syncope, and dyspnea.     Pt is accompanied by her grandson who gives hx along with pt. They note she has been having more cloudy/pink urine in her catheter for the last few days. She said her catheter was exchanged this week, but not able to find records in the chart. Prior to this week, she said it had been a long while since it was changed. She was supposed to start Cycle 4 of chemo today, but experienced episode on the way today where she stood up and immediately afterwards felt very dizzy, weak, and short of breath. Denies any falls. This lasted for a few minutes and she was unable to get herself out of the car on her own. However, it has since resolved. She denies any chest pain, cough, fevers, chills.      Does endorse persistent abdominal pain since " surgery, ongoing nausea, and occasional vomiting. No new weakness, numbness, or tingling. No changes in mentation.      Hospital Course:  Dz:   - Serous endometrial adenocarcinoma of uterus s/p SNEHA, BSO (7/12/2024) s/p Cycle 3 carbo/taxel (10/15/24), cystotomy s/p suprapubic catheter. No issues or treatments while in house.   FEN:   - She was given ERP for her electrolyte abnormalities and fluid boluses. She was tolerating PO intake.  Pain:   - Her wound pain was controlled with PO pain meds. Her pain was well controlled on this and she was discharged home with these medications.  CV:   - She has a history of atrial fibrillation and was on PTA Eliquis. Her home medication for hypertension was continued.  Her vital signs were stable while in house and she had no acute CV issues.  PULM:   - She has a history of asthma, her PTA albuterol was provided. She had no acute pulmonary issues while in house.  HEME:   - She has history of anemia of chronic disease. She had no other acute heme issues while in house.  GI:   - Her abdominal pain on admission improved with oral pain medications. She had no acute GI issues while in house.  :    - She has a hx/o ESBL urosepsis and bacteremia. On admission was found to have a UTI and lactic acidosis, urine culture showed mixed urogenital elisa. Given symptoms the catheter was exchanged and she continued on IV zosyn. Lactic acidosis resolved. She transitioned to oral bactrim and will discharge with 7 day course. She remained afebrile.    ID:   - See above for UTI    ENDO:   - no issues  PSYCH/NEURO/MSK:   -  WOC was consulted for her chronic coccyx wound, and recommended q3 day mepilex dressing changes. PT saw patient and recommended TCU, however patient declined placement and prefers to be at home with family support and home health care.   PPX:    -  She was given SCDs, IS, PPI and lovenox during her hospital course.  She tolerated these prophylactic interventions without incident.   They were discontinued at the time of her discharge.      Discharge Instructions and Follow up:  Ms. Alis Hartman was discharged from the hospital with follow up with PCP and with home health care.    Discharge Diet: Regular  Discharge Activity: Activity as tolerated  Discharge Follow up: Follow up with PCP, patient needs PCP visit for home health supply orders and wheel chair order     Discharge Disposition:  Discharged to home with home health care     Discharge Staff: Dany Vieira MD  Obstetrics and Gynecology, PGY-2  11/10/2024 10:17 AM

## 2024-11-09 LAB
ANION GAP SERPL CALCULATED.3IONS-SCNC: 8 MMOL/L (ref 7–15)
BASOPHILS # BLD AUTO: 0 10E3/UL (ref 0–0.2)
BASOPHILS NFR BLD AUTO: 0 %
BUN SERPL-MCNC: 9.5 MG/DL (ref 8–23)
CALCIUM SERPL-MCNC: 7.5 MG/DL (ref 8.8–10.4)
CHLORIDE SERPL-SCNC: 100 MMOL/L (ref 98–107)
CREAT SERPL-MCNC: 0.89 MG/DL (ref 0.51–0.95)
EGFRCR SERPLBLD CKD-EPI 2021: 69 ML/MIN/1.73M2
EOSINOPHIL # BLD AUTO: 0 10E3/UL (ref 0–0.7)
EOSINOPHIL NFR BLD AUTO: 0 %
ERYTHROCYTE [DISTWIDTH] IN BLOOD BY AUTOMATED COUNT: 19.5 % (ref 10–15)
GLUCOSE SERPL-MCNC: 86 MG/DL (ref 70–99)
HCO3 SERPL-SCNC: 28 MMOL/L (ref 22–29)
HCT VFR BLD AUTO: 23.2 % (ref 35–47)
HGB BLD-MCNC: 7.4 G/DL (ref 11.7–15.7)
HOLD SPECIMEN: NORMAL
IMM GRANULOCYTES # BLD: 0 10E3/UL
IMM GRANULOCYTES NFR BLD: 0 %
LYMPHOCYTES # BLD AUTO: 1.6 10E3/UL (ref 0.8–5.3)
LYMPHOCYTES NFR BLD AUTO: 33 %
MAGNESIUM SERPL-MCNC: 1.8 MG/DL (ref 1.7–2.3)
MCH RBC QN AUTO: 30.2 PG (ref 26.5–33)
MCHC RBC AUTO-ENTMCNC: 31.9 G/DL (ref 31.5–36.5)
MCV RBC AUTO: 95 FL (ref 78–100)
MONOCYTES # BLD AUTO: 0.7 10E3/UL (ref 0–1.3)
MONOCYTES NFR BLD AUTO: 14 %
NEUTROPHILS # BLD AUTO: 2.6 10E3/UL (ref 1.6–8.3)
NEUTROPHILS NFR BLD AUTO: 53 %
NRBC # BLD AUTO: 0 10E3/UL
NRBC BLD AUTO-RTO: 0 /100
PHOSPHATE SERPL-MCNC: 2.7 MG/DL (ref 2.5–4.5)
PLATELET # BLD AUTO: 154 10E3/UL (ref 150–450)
POTASSIUM SERPL-SCNC: 3.7 MMOL/L (ref 3.4–5.3)
RBC # BLD AUTO: 2.45 10E6/UL (ref 3.8–5.2)
SODIUM SERPL-SCNC: 136 MMOL/L (ref 135–145)
WBC # BLD AUTO: 5 10E3/UL (ref 4–11)

## 2024-11-09 PROCEDURE — 82565 ASSAY OF CREATININE: CPT

## 2024-11-09 PROCEDURE — 85004 AUTOMATED DIFF WBC COUNT: CPT

## 2024-11-09 PROCEDURE — 84100 ASSAY OF PHOSPHORUS: CPT

## 2024-11-09 PROCEDURE — 85048 AUTOMATED LEUKOCYTE COUNT: CPT

## 2024-11-09 PROCEDURE — 83735 ASSAY OF MAGNESIUM: CPT

## 2024-11-09 PROCEDURE — 36415 COLL VENOUS BLD VENIPUNCTURE: CPT

## 2024-11-09 PROCEDURE — 80048 BASIC METABOLIC PNL TOTAL CA: CPT

## 2024-11-09 PROCEDURE — 250N000011 HC RX IP 250 OP 636

## 2024-11-09 PROCEDURE — 120N000002 HC R&B MED SURG/OB UMMC

## 2024-11-09 PROCEDURE — 250N000013 HC RX MED GY IP 250 OP 250 PS 637

## 2024-11-09 PROCEDURE — 99232 SBSQ HOSP IP/OBS MODERATE 35: CPT | Mod: GC | Performed by: OBSTETRICS & GYNECOLOGY

## 2024-11-09 PROCEDURE — 250N000013 HC RX MED GY IP 250 OP 250 PS 637: Performed by: NURSE PRACTITIONER

## 2024-11-09 RX ORDER — SULFAMETHOXAZOLE AND TRIMETHOPRIM 800; 160 MG/1; MG/1
1 TABLET ORAL 2 TIMES DAILY
Status: DISCONTINUED | OUTPATIENT
Start: 2024-11-09 | End: 2024-11-10 | Stop reason: HOSPADM

## 2024-11-09 RX ADMIN — SULFAMETHOXAZOLE AND TRIMETHOPRIM 1 TABLET: 800; 160 TABLET ORAL at 08:51

## 2024-11-09 RX ADMIN — APIXABAN 2.5 MG: 2.5 TABLET, FILM COATED ORAL at 20:38

## 2024-11-09 RX ADMIN — OXYCODONE HYDROCHLORIDE 10 MG: 5 TABLET ORAL at 04:35

## 2024-11-09 RX ADMIN — PIPERACILLIN SODIUM AND TAZOBACTAM SODIUM 3.38 G: 3; .375 INJECTION, SOLUTION INTRAVENOUS at 02:17

## 2024-11-09 RX ADMIN — SULFAMETHOXAZOLE AND TRIMETHOPRIM 1 TABLET: 800; 160 TABLET ORAL at 20:38

## 2024-11-09 RX ADMIN — OXYCODONE HYDROCHLORIDE 10 MG: 5 TABLET ORAL at 16:29

## 2024-11-09 RX ADMIN — OXYCODONE HYDROCHLORIDE 10 MG: 5 TABLET ORAL at 20:38

## 2024-11-09 RX ADMIN — APIXABAN 2.5 MG: 2.5 TABLET, FILM COATED ORAL at 08:51

## 2024-11-09 RX ADMIN — OXYCODONE HYDROCHLORIDE 10 MG: 5 TABLET ORAL at 10:50

## 2024-11-09 ASSESSMENT — ACTIVITIES OF DAILY LIVING (ADL)
ADLS_ACUITY_SCORE: 0
DEPENDENT_IADLS:: CLEANING;COOKING;LAUNDRY;TRANSPORTATION;MEAL PREPARATION
ADLS_ACUITY_SCORE: 0
ADLS_ACUITY_SCORE: 0

## 2024-11-09 NOTE — PLAN OF CARE
BP 95/65 (BP Location: Left arm, Cuff Size: Adult Regular)   Pulse 73   Temp 97.4  F (36.3  C) (Oral)   Resp 16   SpO2 100%       Assumed cares: 1900-0730    Neuro: A&Ox4.   Cardiac: SR. VSS.   Respiratory: Sating at 100% on RA.  GI/: denies n/v/d. Adequate urine output via suprapubic catheter. BM X 0 on this shift.   Diet/appetite: Tolerating regular diet.   Activity:  Assist of 1 (stand and pivot)  Pain: At acceptable level on current regimen. Abdominal/coccyx pain rated 8/10, prn oxycodone given x2.   Skin: No new deficits noted. Mepilex on sacrum.   LDA's: right chest port, SL.     Plan: Continue with POC. Notify primary team with changes.   Problem: Adult Inpatient Plan of Care  Goal: Plan of Care Review  Description: The Plan of Care Review/Shift note should be completed every shift.  The Outcome Evaluation is a brief statement about your assessment that the patient is improving, declining, or no change.  This information will be displayed automatically on your shift  note.  Outcome: Progressing  Flowsheets (Taken 11/9/2024 2062)  Plan of Care Reviewed With: patient  Overall Patient Progress: no change   Goal Outcome Evaluation:      Plan of Care Reviewed With: patient    Overall Patient Progress: no changeOverall Patient Progress: no change

## 2024-11-09 NOTE — PROGRESS NOTES
Gynecology Oncology Progress Note    HD#3 SOB, weakness, near syncope, UTI     Disease: Serous endometrial adenocarcinoma of uterus. S/p SNEHA, BSO, cystotomy s/p suprapubic catheter (7/12), C4 carbo/taxel (11/5)    24 hour events:   -Urology consult  -OT saw, recommended TCU, patient declined    Subjective: Feeling overall well this AM.  Some pain in her coccyx area, appreciated WO nurse helping with cares yesterday.  Minimal abdominal pain.  Feels very cold in the hospital but is better with many layers of blankets.  No pain around her suprapubic catheter site.  Concerned about the size of a Bactrim pill, feels that if it is too big she will not take it.    Objective:   Patient Vitals for the past 24 hrs:   BP Temp Temp src Pulse Resp SpO2   11/09/24 0631 95/65 97.4  F (36.3  C) Oral 73 16 100 %   11/08/24 2306 91/68 97.7  F (36.5  C) Oral 71 16 100 %   11/08/24 1528 97/60 97.4  F (36.3  C) Oral 66 16 100 %   11/08/24 1340 -- -- -- 68 -- 100 %   11/08/24 1330 94/62 -- -- 71 -- --   11/08/24 0740 -- -- -- 88 -- 100 %   11/08/24 0730 99/72 -- -- 82 18 --   11/08/24 0700 102/76 97.5  F (36.4  C) Oral 84 -- --       General: NAD, appears comfortable in bed  CV: Well-perfused  Resp: Normal work of breathing  Abdomen: soft, non-distended, nontender at the site of the suprapubic catheter   Skin: chronic coccyx wound with overlying dressing, partially visualized this morning  Extremities: warm, well-perfused, nontender, +1 edema    Intake/Output    Intake/Output Summary (Last 24 hours) at 11/9/2024 0653  Last data filed at 11/9/2024 0639  Gross per 24 hour   Intake --   Output 925 ml   Net -925 ml          Micro urine culture:   Culture >100,000 CFU/mL Mixture of Urogenital Hina      Multiple morphotypes present with no predominant organism.  Growth consistent with probable contamination during collection.  Suggest repeat specimen if clinically indicated          Assessment: 69 year old female with serous endometrial  adenocarcinoma of uterus s/p SNEHA, BSO (7/12/2024) s/p Cycle 3 carbo/taxel (10/15/24), cystotomy s/p suprapubic catheter w/ hx of ESBL urosepsis & bacteremia, A-fib on eliquis, HTN, CKD stage 3a, and anemia.  Currently HD#3 due to concerns for urinary tract infection.     # Recurrent UTI  # Hx of ESBL urosepsis & bacteremia  Lactic acidosis on admission, resolved.  Likely in the setting of dehydration and infection.  Started on Vancomycin and Zosyn in the ED,  narrowed to zosyn.  Seen by urology yesterday that provided counseling on suprapubic catheter hygiene.  Patient had been due for a exchange of her catheter.  -Continue regular suprapubic catheter hygiene  -SPTs should be changed monthly.  Urology to set up follow-up appointment for exchange in 1 month.  Will also arrange appointment to check in in 3 months.  -Plan to transition from Zosyn to Bactrim today for treatment of probable recurrent urinary tract infection. Continue treatment for 7 days. Monitor today on oral antibiotics.  -AM CBC, BMP    # Deconditioning  # Near syncope   # Dispo planning  Improvement in symptoms after admission.   Cardiovascular workup (including checks x-ray, EKG, and troponin) overall unremarkable.  Did have elevated troponin (trended 22>26>25), however these have been stable over serial assessments.   - s/p PT consult, recommended TCU.  The patient declines this placement, as she endorses good family support at home.  She is interested in hearing about what assistive devices he might be able to receive at home.  Will plan to discuss this with physical therapy today  - Patient would likely benefit from outpatient IV fluids and electrolyte replacement.  Will send message to clinic to help coordinate this    # CKD Stage 3a  Creatinine has been stable during admission    # Electrolyte Abnormalities  - Hypokalemia at 2.7, improved to 3.1 yesterday morning.  Will repeat again today, plan to order potassium replacement protocol as  needed.  - Hypomagnesemia on admission, resolved     # Chronic coccyx wound  - Present on admission  - WOC consulted, patient is well-known to their service.  Will follow patient weekly     # Chronic stable conditions  - Anemia of chronic disease: Hemoglobin has been stably low during admission.  - Atrial Fibrillation: PTA Eliquis   - HTN: PTA atenolol   - Asthma: PTA albuterol   - MDD, schizoaffective disorder: No PTA medications.    Anali Cherry MD MPH  Gynecology Oncology PGY-4     Gynecologic Oncology Attending Attestation  I have seen the patient on rounds with the team. No significant changes since last night. Transitioned to oral Bactrim for treatment of presumed recurrent UTI. Will discharge with 7 day course. Needs some SW coordination but would be stable for discharge pending doing well on oral antibiotics.  I have discussed the patient and plan of care with the resident as documented in the note above, which I have edited as necessary.    Dany Perez MD    Gynecologic Oncology

## 2024-11-09 NOTE — PLAN OF CARE
Goal Outcome Evaluation:    /66 (BP Location: Left arm, Patient Position: Right side, Cuff Size: Adult Regular)   Pulse 84   Temp 97.6  F (36.4  C) (Oral)   Resp 16   SpO2 100%     Neuro: A&Ox4.   Cardiac: hypotensive baseline and asymptomatic   Respiratory: Sating 100% on RA.  GI/: Adequate urine output through surprapubic. BM X1  Diet/appetite: Tolerating regular diet. Eating well.   Activity:  Assist of 1-2 with stand and pivot to commode. Patient very weak and unable to tolerate minimal activity and ADLs or motivated to work with PT/OT. Cooperative with recommendations to continue repositioning in bed to offload sacral area and wound.  Pain: At acceptable level on current regimen located at sacral wound that was present PTA.  Skin: Sacral wound PTA, unstageable PI - consulted by WOC and orders for wound care and recommendations in chart.  LDA's: Right chest port accessed, saline locked, and blood return good.    Plan: Continue with POC. Notify primary team with changes.        Plan of Care Reviewed With: patient    Overall Patient Progress: no change

## 2024-11-09 NOTE — CONSULTS
Care Management Initial Consult    General Information  Assessment completed with: VM-chart review, Children, daughter Joy  Type of CM/SW Visit: Initial Assessment    Primary Care Provider verified and updated as needed: Yes (Maria Fernanda Eckert [phone], [place] [facility] [address])   Readmission within the last 30 days: no previous admission in last 30 days (ED visit on 10/10/2024 for acute cystitis with hematuria)      Reason for Consult: utilization management concerns, discharge planning (elevated readmission risk)  Advance Care Planning: Advance Care Planning Reviewed:  (Invalid HCD on file. ACP documents provided)          Communication Assessment  Patient's communication style: spoken language (English or Bilingual)             Cognitive  Cognitive/Neuro/Behavioral: WDL                      Living Environment:   People in home: grandchild(vernell), child(vernell), adult     Current living Arrangements: house      Able to return to prior arrangements: yes       Family/Social Support:  Care provided by: child(vernell)/PCA  Provides care for: no one, unable/limited ability to care for self  Marital Status: Single  Support system: Children, PCA, Other (specify) (grandchildren)          Description of Support System: Supportive, Involved    Support Assessment: Adequate family and caregiver support, Adequate social supports    Current Resources:   Patient receiving home care services: Yes  Skilled Home Care Services: Skilled Nursing, Physical Therapy, Occupational Therapy (Currently provided by [facility]     Community Resources: Financial/Insurance, County Worker, PCA  Equipment currently used at home: commode chair, shower chair, walker, rolling, wheelchair, manual  Supplies currently used at home:      Employment/Financial:  Employment Status: retired        Financial Concerns: none   Referral to Financial Worker: No       Does the patient's insurance plan have a 3 day qualifying hospital stay  waiver?  Yes     Which insurance plan 3 day waiver is available? Alternative insurance waiver    Will the waiver be used for post-acute placement? No    Lifestyle & Psychosocial Needs:  Social Drivers of Health     Food Insecurity: Low Risk  (9/21/2024)    Food Insecurity     Within the past 12 months, did you worry that your food would run out before you got money to buy more?: No     Within the past 12 months, did the food you bought just not last and you didn t have money to get more?: No   Depression: Not at risk (9/4/2024)    PHQ-2     PHQ-2 Score: 0   Housing Stability: Low Risk  (9/21/2024)    Housing Stability     Do you have housing? : Yes     Are you worried about losing your housing?: No   Tobacco Use: Low Risk  (11/4/2024)    Patient History     Smoking Tobacco Use: Never     Smokeless Tobacco Use: Never     Passive Exposure: Not on file   Financial Resource Strain: Low Risk  (9/21/2024)    Financial Resource Strain     Within the past 12 months, have you or your family members you live with been unable to get utilities (heat, electricity) when it was really needed?: No   Alcohol Use: Not on file   Transportation Needs: High Risk (9/21/2024)    Transportation Needs     Within the past 12 months, has lack of transportation kept you from medical appointments, getting your medicines, non-medical meetings or appointments, work, or from getting things that you need?: Yes   Physical Activity: Not on file   Interpersonal Safety: Low Risk  (9/21/2024)    Interpersonal Safety     Do you feel physically and emotionally safe where you currently live?: Yes     Within the past 12 months, have you been hit, slapped, kicked or otherwise physically hurt by someone?: No     Within the past 12 months, have you been humiliated or emotionally abused in other ways by your partner or ex-partner?: No   Stress: Not on file   Social Connections: Not on file   Health Literacy: Not on file       Functional Status:  Prior to  "admission patient needed assistance:   Dependent ADLs:: Ambulation-walker  Dependent IADLs:: Cleaning, Cooking, Laundry, Transportation, Meal Preparation  Assesssment of Functional Status: Not at baseline with ADL Functioning, Not at baseline with mobility    Mental Health Status:  Mental Health Status: No Current Concerns       Chemical Dependency Status:  Chemical Dependency Status: No Current Concerns             Values/Beliefs:  Spiritual, Cultural Beliefs, Muslim Practices, Values that affect care: no               Discussed  Partnership in Safe Discharge Planning  document with patient/family: No    Additional Information:      Per chart review: \"Alis Hartman (Tanya) is a 69 year old female with 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 BANDAR performed chart review to begin assessment.     Case discussed with Gyn/Onc Resident Krsity who reported that pt had declined TCU placement and wanted to discharge home. MD Wagner also reported that pt's daughter wanted a new home care agency as she felt the current agency was not meeting pt's needs.    [id number] SW called Aranza's daughter Joy Hartman  ([phone]) to complete assessment and confirm/update information in previous assessment (9/22/2024), and discuss her concerns regarding current HHC. BANDAR introduced self and role, and explained the reason for the call. Joy agreed to speak with SW    Joy reported that the RN's from the agency do not visit regularly, seem confused about how to provide pt's " "care; and once left a needle in one of pt's ports. SW asked if [facility] was providing wound care and if so, were they providing the supplies. Joy reported that she has been buying the supplies and that one of the RN's took them when they left the home. SW agreed to send a new home care referral.    Joy reports that her mother doesn't want to engage in PT therapies. She attributes the wound on her sacrum/coccyx to her lying in bed all day and not getting up at all. She reported that she is \"just going to have to get bossy\" with her mother and \"force her\" to do exercises. SW asked if Aranza's current chemo might be contributing to her not participating in PT. She agreed that it probably was and expressed hope that once Aranza was able to discharge, she would feel better and start to participate once again.     SW provided emotional support via active and reflective listening and responding to feelings; normalized and validated feelings and experiences; and provided a supportive non-anxious presence. No additional questions or concerns were reported. SW provided availability and contact information and the call was ended.    2010 Initial Homecare Referral sent via Dr. Z to [facility] for review. SW noted that if [facility] could not accept, pt did not want referral out-sourced to [facility] as the agency was not able to meet pt's needs    Next Steps:    Follow up with [facility] referral  Provide HCD documents (pt has invalid HCD on file.  SW will continue to follow as needed.    WILLIAM Lares, LGSW  ED/OBS   [facility]  Phone: [phone]  Pager: [phone]  Fax: [phone]     After hours [employer] and After Hours [employer] [place]     On-call pager, [phone], 4:00 pm to 8:30 pm                            "

## 2024-11-09 NOTE — PROGRESS NOTES
Brief progress note     Called patient's daughter, Joy Hartman, at [phone] for an update on patient's progress. Discussed the plan to transition to Bactrim for a 7 days course and care coordination to ensure she has the necessary tools while at home. Her daughter confirmed that patient doesn't need a TCU and the family can provide 24/7 care as needed. Her daughter endorsed last home care service was poor and there were days no RN showed up to help patient with her wound care. She would like the name of the home health care facility that's arranged for her on discharged. She had no other questions or concern.     Kristy Chris MD MPH  Obstetric & Gynecology, PGY-2  November 9, 2024 , 2:32 PM

## 2024-11-10 VITALS
WEIGHT: 139.33 LBS | SYSTOLIC BLOOD PRESSURE: 99 MMHG | OXYGEN SATURATION: 100 % | BODY MASS INDEX: 24.69 KG/M2 | DIASTOLIC BLOOD PRESSURE: 66 MMHG | RESPIRATION RATE: 18 BRPM | TEMPERATURE: 97.5 F | HEART RATE: 80 BPM | HEIGHT: 63 IN

## 2024-11-10 LAB
ANION GAP SERPL CALCULATED.3IONS-SCNC: 7 MMOL/L (ref 7–15)
BUN SERPL-MCNC: 9 MG/DL (ref 8–23)
CALCIUM SERPL-MCNC: 7.6 MG/DL (ref 8.8–10.4)
CHLORIDE SERPL-SCNC: 101 MMOL/L (ref 98–107)
CREAT SERPL-MCNC: 0.97 MG/DL (ref 0.51–0.95)
EGFRCR SERPLBLD CKD-EPI 2021: 62 ML/MIN/1.73M2
ERYTHROCYTE [DISTWIDTH] IN BLOOD BY AUTOMATED COUNT: 19.4 % (ref 10–15)
GLUCOSE SERPL-MCNC: 81 MG/DL (ref 70–99)
HCO3 SERPL-SCNC: 28 MMOL/L (ref 22–29)
HCT VFR BLD AUTO: 24.4 % (ref 35–47)
HGB BLD-MCNC: 7.6 G/DL (ref 11.7–15.7)
MCH RBC QN AUTO: 29.6 PG (ref 26.5–33)
MCHC RBC AUTO-ENTMCNC: 31.1 G/DL (ref 31.5–36.5)
MCV RBC AUTO: 95 FL (ref 78–100)
PLATELET # BLD AUTO: 173 10E3/UL (ref 150–450)
POTASSIUM SERPL-SCNC: 3.9 MMOL/L (ref 3.4–5.3)
RBC # BLD AUTO: 2.57 10E6/UL (ref 3.8–5.2)
SODIUM SERPL-SCNC: 136 MMOL/L (ref 135–145)
WBC # BLD AUTO: 4.9 10E3/UL (ref 4–11)

## 2024-11-10 PROCEDURE — 250N000013 HC RX MED GY IP 250 OP 250 PS 637

## 2024-11-10 PROCEDURE — 250N000013 HC RX MED GY IP 250 OP 250 PS 637: Performed by: NURSE PRACTITIONER

## 2024-11-10 PROCEDURE — 82374 ASSAY BLOOD CARBON DIOXIDE: CPT

## 2024-11-10 PROCEDURE — 80048 BASIC METABOLIC PNL TOTAL CA: CPT

## 2024-11-10 PROCEDURE — 85014 HEMATOCRIT: CPT

## 2024-11-10 PROCEDURE — 36591 DRAW BLOOD OFF VENOUS DEVICE: CPT

## 2024-11-10 PROCEDURE — 250N000011 HC RX IP 250 OP 636: Performed by: OBSTETRICS & GYNECOLOGY

## 2024-11-10 PROCEDURE — 85041 AUTOMATED RBC COUNT: CPT

## 2024-11-10 RX ORDER — HEPARIN SODIUM,PORCINE 10 UNIT/ML
5-10 VIAL (ML) INTRAVENOUS
Status: DISCONTINUED | OUTPATIENT
Start: 2024-11-10 | End: 2024-11-10 | Stop reason: HOSPADM

## 2024-11-10 RX ORDER — HEPARIN SODIUM (PORCINE) LOCK FLUSH IV SOLN 100 UNIT/ML 100 UNIT/ML
5-10 SOLUTION INTRAVENOUS
Status: DISCONTINUED | OUTPATIENT
Start: 2024-11-10 | End: 2024-11-10 | Stop reason: HOSPADM

## 2024-11-10 RX ORDER — SULFAMETHOXAZOLE AND TRIMETHOPRIM 800; 160 MG/1; MG/1
1 TABLET ORAL 2 TIMES DAILY
Qty: 14 TABLET | Refills: 0 | Status: SHIPPED | OUTPATIENT
Start: 2024-11-10 | End: 2024-11-17

## 2024-11-10 RX ORDER — HEPARIN SODIUM,PORCINE 10 UNIT/ML
5-10 VIAL (ML) INTRAVENOUS EVERY 24 HOURS
Status: DISCONTINUED | OUTPATIENT
Start: 2024-11-10 | End: 2024-11-10 | Stop reason: HOSPADM

## 2024-11-10 RX ADMIN — Medication 15 MG: at 07:37

## 2024-11-10 RX ADMIN — APIXABAN 2.5 MG: 2.5 TABLET, FILM COATED ORAL at 07:36

## 2024-11-10 RX ADMIN — OXYCODONE HYDROCHLORIDE 10 MG: 5 TABLET ORAL at 01:42

## 2024-11-10 RX ADMIN — OXYCODONE HYDROCHLORIDE 10 MG: 5 TABLET ORAL at 07:41

## 2024-11-10 RX ADMIN — Medication 5 ML: at 11:27

## 2024-11-10 RX ADMIN — OXYCODONE HYDROCHLORIDE 10 MG: 5 TABLET ORAL at 12:05

## 2024-11-10 RX ADMIN — SULFAMETHOXAZOLE AND TRIMETHOPRIM 1 TABLET: 800; 160 TABLET ORAL at 07:36

## 2024-11-10 RX ADMIN — Medication 12.5 MG: at 07:36

## 2024-11-10 RX ADMIN — DICLOFENAC SODIUM 4 G: 10 GEL TOPICAL at 00:04

## 2024-11-10 ASSESSMENT — ACTIVITIES OF DAILY LIVING (ADL)
ADLS_ACUITY_SCORE: 0

## 2024-11-10 NOTE — PROGRESS NOTES
Notified gyn onc via page:     ED36 LW     can I get heparin orders to de-access the port that she has? Thanks     Maria Elena RN   [phone]

## 2024-11-10 NOTE — PLAN OF CARE
"/65 (BP Location: Left arm)   Pulse 84   Temp 97.9  F (36.6  C) (Oral)   Resp 18   Ht 1.6 m (5' 3\")   Wt 63.2 kg (139 lb 5.3 oz)   SpO2 100%   BMI 24.68 kg/m     Time: 6028-1008  Patient is alert and oriented, on regular diet, assist  of one person with mobility and ADL, C/o ABD area pain, received PRN Oxycodone, and it was effective, slept well most of the night, VS stable.     "

## 2024-11-10 NOTE — PLAN OF CARE
Goal Outcome Evaluation:      Plan of Care Reviewed With: family (daughter Joy)    Overall Patient Progress: improvingOverall Patient Progress: improving    Outcome Evaluation: discharge home with (new) HHC-RN/PT/OT    WILLIAM Lares, ALEKS  ED/OBS   [facility]  Phone: [phone]  Pager: [phone]  Fax: [phone]     After hours [employer] and After Hours [employer]oc[employer] [place]     On-call pager, [phone], 4:00 pm to 8:30 pm

## 2024-11-10 NOTE — PROGRESS NOTES
Neuro: A&Ox4, able to make needs known, calls appropriately   Cardiac: SR. VSS, denies chest pain   Respiratory: Sating 98% on RA, denies SOB  GI/: Adequate urine output, voiding via suprapubic catheter   Diet/appetite: Tolerating regulr diet. Eating well.  Activity:  Assist of 1, up to chair and in halls.  Pain: At acceptable level on current regimen, taking 10 mg of oxycodone   Skin: No new deficits noted.  LDA's:    Plan: Continue with POC. Notify primary team with changes.        Discharge instructions reviewed with patient. All questions/concerns regarding AVS addressed and patient acknowledged understanding. PIV removed and all personal belongings secured with patient. Medications being sent to patient home pharmacy Patient discharging hospital via vehicle with family.

## 2024-11-10 NOTE — PROGRESS NOTES
"Brief Interval Progress Note     S: MD to bedside for PM rounds. Feeling ok, notes ongoing discomfort at site of sacral wound. Reports interventions from WOC yesterday were helpful but is having more discomfort today. No new fever/chills, nausea/vomiting, dizziness/lightheadedness. Voiding spontaneously. Ambulatory ad boo. No additional concerns.         O:   /71   Pulse 84   Temp 97.8  F (36.6  C) (Oral)   Resp 14   Ht 1.6 m (5' 3\")   Wt 63.2 kg (139 lb 5.3 oz)   SpO2 100%   BMI 24.68 kg/m    GEN: NAD, sitting upright in bed, speaking in full sentences and responding to questions appropriately   BACK: Mepiplex in place overlying sacral wound     Date 11/09/24 0700 - 11/10/24 0659   Shift 2303-8799 4067-8399 6097-2509 24 Hour Total   INTAKE   Shift Total(mL/kg)       OUTPUT   Urine 200 175  375   Shift Total(mL/kg) 200 175(2.77)  375(5.93)   Weight (kg)  63.2 63.2 63.2       A/P:   Stable. No evidence worsening infection, plan to continue Bactrim x7 days. Having discomfort from sacral wound, dressed appropriately on exam. On review of WOC notes recommend changes Q3 days. Discussed that she can get Tylenol or PRN oxycodone for significant discomfort, and encouraged to try different position changes rather than sitting upright to relieve pressure from area. Otherwise continue plan of care, awaiting home care coordination.       Robert Coulter MD   [employer]   Gynecologic Oncology, PGY-2  Gyn Onc Pager: [phone]      "

## 2024-11-10 NOTE — PROGRESS NOTES
"Gynecology Oncology Progress Note    HD#4 SOB, weakness, near syncope, UTI     Disease: Serous endometrial adenocarcinoma of uterus. S/p SNEHA, BSO, cystotomy s/p suprapubic catheter (7/12), C4 carbo/taxel (11/5)    24 hour events:   - Working with physical therapy   - Afebrile, vitals within normal limits    Subjective: Feeling overall well this AM.  Continues to have pain at sacral wound site that prevents her from getting comfortable in bed, but feels that it is a little better this morning.  Minimal abdominal pain. .  No pain around her suprapubic catheter site.  No additional concerns. Inquiring about discharge and coordination with home care RN.     Objective:   Patient Vitals for the past 24 hrs:   BP Temp Temp src Pulse Resp SpO2 Height Weight   11/10/24 0006 105/65 97.9  F (36.6  C) Oral 84 18 100 % -- --   11/09/24 1946 102/71 97.8  F (36.6  C) Oral -- 14 99 % 1.6 m (5' 3\") 63.2 kg (139 lb 5.3 oz)   11/09/24 1616 113/66 97.6  F (36.4  C) Oral 84 16 100 % -- --   11/09/24 0853 103/74 97.4  F (36.3  C) Oral -- 16 95 % -- --   11/09/24 0631 95/65 97.4  F (36.3  C) Oral 73 16 100 % -- --       General: NAD, appears comfortable in bed  CV: Well-perfused  Resp: Normal work of breathing  Abdomen: soft, non-distended, nontender at the site of the suprapubic catheter   Skin: chronic coccyx wound with overlying dressing, partially visualized this morning  Extremities: warm, well-perfused, nontender, +1 edema    Intake/Output    Intake/Output Summary (Last 24 hours) at 11/9/2024 0653  Last data filed at 11/9/2024 0639  Gross per 24 hour   Intake --   Output 925 ml   Net -925 ml          Micro urine culture:   Culture >100,000 CFU/mL Mixture of Urogenital Hina      Multiple morphotypes present with no predominant organism.  Growth consistent with probable contamination during collection.  Suggest repeat specimen if clinically indicated          Assessment: 69 year old female with serous endometrial adenocarcinoma of " uterus s/p SNEHA, BSO (7/12/2024) s/p Cycle 3 carbo/taxel (10/15/24), cystotomy s/p suprapubic catheter w/ hx of ESBL urosepsis & bacteremia, A-fib on eliquis, HTN, CKD stage 3a, and anemia.  Currently HD#4 due to concerns for urinary tract infection, doing well on transition to PO antibiotics. Possible discharge today pending disposition coordination and home care nursing coordination, has extensive family support for help with cares.     # Recurrent UTI  # Hx of ESBL urosepsis & bacteremia  Lactic acidosis on admission, resolved.  Likely in the setting of dehydration and infection.  Started on Vancomycin and Zosyn in the ED,  narrowed to zosyn.  Seen by urology yesterday that provided counseling on suprapubic catheter hygiene.  Patient had been due for a exchange of her catheter.  -Continue regular suprapubic catheter hygiene  -SPTs should be changed monthly.  Urology to set up follow-up appointment for exchange in 1 month.  Will also arrange appointment to check in in 3 months.  - D#2/7 PO Bactrim   -AM CBC, BMP    # Deconditioning  # Near syncope   # Dispo planning  Improvement in symptoms after admission.   Cardiovascular workup (including checks x-ray, EKG, and troponin) overall unremarkable.  Did have elevated troponin (trended 22>26>25), however these have been stable over serial assessments.   - s/p PT consult, recommended TCU.  The patient declines this placement, as she endorses good family support at home.  She is interested in assistive devices, PT can provide walker but needs PCP to order wheelchair. Per discussion with family they are able to provide 24/7 home care for BANDAR Cobian to help arrange home cares. Daughter requests contact info for home care RN given previous issues with prior caretakers following most recent hospital discharge  - Patient would likely benefit from outpatient IV fluids and electrolyte replacement.  Will send message to clinic to help coordinate this    # CKD Stage  3a  Creatinine has been stable during admission    # Electrolyte Abnormalities  - Hypokalemia, resolved. K 3.1 >  2.7 > 3.8 > 3.7    - Hypomagnesemia on admission, resolved     # Chronic coccyx wound  - Present on admission  - WOC consulted, patient is well-known to their service.  Will follow patient weekly     # Chronic stable conditions  - Anemia of chronic disease: Hemoglobin has been stably low during admission.  - Atrial Fibrillation: PTA Eliquis   - HTN: PTA atenolol   - Asthma: PTA albuterol   - MDD, schizoaffective disorder: No PTA medications.    Robert Coulter MD   [employer]   Gynecologic Oncology, PGY-2  Gyn Onc Pager: [phone]

## 2024-11-11 ENCOUNTER — PATIENT OUTREACH (OUTPATIENT)
Dept: CARE COORDINATION | Facility: CLINIC | Age: 69
End: 2024-11-11
Payer: COMMERCIAL

## 2024-11-11 DIAGNOSIS — R33.9 URINE RETENTION: Primary | ICD-10-CM

## 2024-11-11 NOTE — TELEPHONE ENCOUNTER
Clinic Care Coordination Chart Review     Situation: Patient chart reviewed by care coordination leader.     Background: Primary Care Care Coordination referral entered by Inpatient Care Manager through IP to Amb CM handoff process.     Assessment: Per chart review, patient has primary care provider outside of [facility].  Primary Care Care Coordination team supports patients in collaboration with their [facility] PCP therefore this patient would not be a candidate for outreach.       Plan/Recommendations: Patient can be encouraged to follow discharge instructions as outlined on AVS. No intervention planned by [facility] Primary Care Care Coordination team at this time.     Ella Cordova, KATHEN, RN   Manager of Ambulatory Care Management  [facility]

## 2024-11-12 LAB — BACTERIA BLD CULT: NO GROWTH

## 2024-11-13 ENCOUNTER — TELEPHONE (OUTPATIENT)
Dept: ONCOLOGY | Facility: CLINIC | Age: 69
End: 2024-11-13
Payer: COMMERCIAL

## 2024-11-13 NOTE — TELEPHONE ENCOUNTER
Left VM- has not arrived for appt yet, almost an hour after visit start. Reviewed that she does need to be seen prior to her infusion. Requested that she call the clinic if she needs to reschedule and given # for call back.  ERINN Hammond CNP on 11/13/2024 at 8:40 AM

## 2024-11-14 NOTE — TELEPHONE ENCOUNTER
Outgoing call placed to patient re rescheduling appointments as mentioned below, but reached voicemail. VM left requesting call back to clinic. Scheduling number given.    Barb Nieto, RN, BSN  RN Care Coordinator - Oncology/Hematology  [facility]

## 2024-11-14 NOTE — PROGRESS NOTES
Received notification that re-faxed orders did not go through to [facility]. Writer called to inquire about new fax number and they stated [phone] is the correct fax number. I spoke with a representative and they gave me an email of [email or web address] and this goes to Estefanía at [facility]. I sent this at 10:39 and they said that they will call once the referral is received. Aren's direct desk number given.    Barb Nieto, RN, BSN  RN Care Coordinator - Oncology/Hematology  [facility]

## 2024-11-15 ENCOUNTER — MYC MEDICAL ADVICE (OUTPATIENT)
Dept: UROLOGY | Facility: CLINIC | Age: 69
End: 2024-11-15
Payer: COMMERCIAL

## 2024-11-20 ENCOUNTER — PATIENT OUTREACH (OUTPATIENT)
Dept: ONCOLOGY | Facility: CLINIC | Age: 69
End: 2024-11-20
Payer: COMMERCIAL

## 2024-11-20 NOTE — PROGRESS NOTES
[facility]: Cancer Care Note                                                          Reviewed patient's chart today to follow-up on recently missed appointments.  Noted that patient hasn't returned calls/voicemail messages from our clinic yet, to reschedule her missed appointments for provider visit with Marcia Painting, CNP, labs, and chemotherapy treatment.  Voicemail messages from our clinic were left for patient on both 11/13/24 and 11/14/24.    Upon review of patient's chart, writer noted that she missed her chemotherapy treatment on 11/6/24 due to transportation issues, 11/7/24 due to going to the ER (and inpatient admission), and she did not show for her appointments on 11/13/24.       has already been involved with patient due to ongoing transportation issues, and advised that patient does have a transportation benefit through her insurance plan.      Provided above updates to Dr. Perez, who asked that writer try reaching out to patient again this week - and, if no answer, to try calling patient's emergency contact.  Dr. Perez also requested that we try and get patient's chemotherapy appointments rescheduled.    RN attempted reaching patient via telephone this afternoon, but there was no answer.  A detailed voicemail message was left, encouraging patient to callback so that we can assist her with rescheduling her appointments.      RN also called patient's first emergency contact this afternoon (daughter, Joy), and was able to connect.  Daughter states she is unsure why patient has not been answering our calls or returning our messages, but agrees that they would like to get her chemotherapy appointments and provider visit rescheduled.  Daughter would like to be contacted by our scheduling team directly with the appointment information, and stated she will make sure that patient gets to her appointments this time.    Request sent to scheduling team this afternoon.  Also  routed note to Dr. Perez to provide these updates.      Nicholas Jarrell, RN, BSN, OCN  RN Care Coordinator - Oncology  [facility]

## 2024-11-21 ENCOUNTER — PATIENT OUTREACH (OUTPATIENT)
Dept: ONCOLOGY | Facility: CLINIC | Age: 69
End: 2024-11-21

## 2024-11-21 NOTE — PROGRESS NOTES
RN called and spoke with patient and her daughter. They report they can't get into mychart at this time. RN provided dates of follow up appointments for SPT change and 3 month check in.    Irrigation supplies were sent to  home medical. Pt's daughter notes she hasn't gotten them yet. RN left message with  home medical with direct number for follow up on this.     SHOAIB Moncada  Care Coordinator- Urology   [phone]

## 2024-11-26 ENCOUNTER — HOSPITAL ENCOUNTER (INPATIENT)
Facility: CLINIC | Age: 69
DRG: 698 | End: 2024-11-26
Attending: EMERGENCY MEDICINE | Admitting: INTERNAL MEDICINE
Payer: COMMERCIAL

## 2024-11-26 DIAGNOSIS — I48.0 PAROXYSMAL ATRIAL FIBRILLATION (H): ICD-10-CM

## 2024-11-26 DIAGNOSIS — Z93.51 CUTANEOUS-VESICOSTOMY STATUS (H): ICD-10-CM

## 2024-11-26 DIAGNOSIS — I89.0 LYMPHEDEMA: ICD-10-CM

## 2024-11-26 DIAGNOSIS — I82.629 ACUTE DEEP VEIN THROMBOSIS (DVT) OF BRACHIAL VEIN, UNSPECIFIED LATERALITY (H): ICD-10-CM

## 2024-11-26 DIAGNOSIS — N13.39 OTHER HYDRONEPHROSIS: ICD-10-CM

## 2024-11-26 DIAGNOSIS — Z90.710 ACQUIRED ABSENCE OF UTERUS: ICD-10-CM

## 2024-11-26 DIAGNOSIS — A41.9 SEPSIS, DUE TO UNSPECIFIED ORGANISM, UNSPECIFIED WHETHER ACUTE ORGAN DYSFUNCTION PRESENT (H): ICD-10-CM

## 2024-11-26 DIAGNOSIS — E16.2 HYPOGLYCEMIA: ICD-10-CM

## 2024-11-26 DIAGNOSIS — I48.91 ATRIAL FIBRILLATION, UNSPECIFIED TYPE (H): ICD-10-CM

## 2024-11-26 DIAGNOSIS — N18.30 STAGE 3 CHRONIC KIDNEY DISEASE, UNSPECIFIED WHETHER STAGE 3A OR 3B CKD (H): ICD-10-CM

## 2024-11-26 DIAGNOSIS — N18.9 ANEMIA OF CHRONIC RENAL FAILURE, UNSPECIFIED CKD STAGE: ICD-10-CM

## 2024-11-26 DIAGNOSIS — N30.00 ACUTE CYSTITIS WITHOUT HEMATURIA: Primary | ICD-10-CM

## 2024-11-26 DIAGNOSIS — Z90.722 S/P BILATERAL SALPINGO-OOPHORECTOMY: ICD-10-CM

## 2024-11-26 DIAGNOSIS — Z90.710 S/P HYSTERECTOMY: ICD-10-CM

## 2024-11-26 DIAGNOSIS — C55 MALIGNANT NEOPLASM OF UTERUS, UNSPECIFIED SITE (H): ICD-10-CM

## 2024-11-26 DIAGNOSIS — D63.1 ANEMIA OF CHRONIC RENAL FAILURE, UNSPECIFIED CKD STAGE: ICD-10-CM

## 2024-11-26 DIAGNOSIS — I12.9 MALIGNANT HYPERTENSIVE KIDNEY DISEASE WITH CHRONIC KIDNEY DISEASE STAGE I THROUGH STAGE IV, OR UNSPECIFIED(403.00): ICD-10-CM

## 2024-11-26 LAB
ALBUMIN UR-MCNC: 300 MG/DL
APPEARANCE UR: ABNORMAL
BACTERIA #/AREA URNS HPF: ABNORMAL /HPF
BASOPHILS # BLD AUTO: 0 10E3/UL (ref 0–0.2)
BASOPHILS NFR BLD AUTO: 0 %
BILIRUB UR QL STRIP: NEGATIVE
COLOR UR AUTO: ABNORMAL
EOSINOPHIL # BLD AUTO: 0 10E3/UL (ref 0–0.7)
EOSINOPHIL NFR BLD AUTO: 0 %
ERYTHROCYTE [DISTWIDTH] IN BLOOD BY AUTOMATED COUNT: 17.6 % (ref 10–15)
GLUCOSE BLDC GLUCOMTR-MCNC: 214 MG/DL (ref 70–99)
GLUCOSE BLDC GLUCOMTR-MCNC: 259 MG/DL (ref 70–99)
GLUCOSE BLDC GLUCOMTR-MCNC: 34 MG/DL (ref 70–99)
GLUCOSE BLDC GLUCOMTR-MCNC: 54 MG/DL (ref 70–99)
GLUCOSE UR STRIP-MCNC: NEGATIVE MG/DL
HCT VFR BLD AUTO: 30.3 % (ref 35–47)
HGB BLD-MCNC: 9.6 G/DL (ref 11.7–15.7)
HGB UR QL STRIP: ABNORMAL
HYALINE CASTS: 37 /LPF
IMM GRANULOCYTES # BLD: 0.1 10E3/UL
IMM GRANULOCYTES NFR BLD: 1 %
KETONES UR STRIP-MCNC: NEGATIVE MG/DL
LACTATE SERPL-SCNC: 1.6 MMOL/L (ref 0.7–2)
LEUKOCYTE ESTERASE UR QL STRIP: ABNORMAL
LYMPHOCYTES # BLD AUTO: 2.2 10E3/UL (ref 0.8–5.3)
LYMPHOCYTES NFR BLD AUTO: 22 %
MCH RBC QN AUTO: 30.6 PG (ref 26.5–33)
MCHC RBC AUTO-ENTMCNC: 31.7 G/DL (ref 31.5–36.5)
MCV RBC AUTO: 97 FL (ref 78–100)
MONOCYTES # BLD AUTO: 0.7 10E3/UL (ref 0–1.3)
MONOCYTES NFR BLD AUTO: 7 %
MUCOUS THREADS #/AREA URNS LPF: PRESENT /LPF
NEUTROPHILS # BLD AUTO: 6.9 10E3/UL (ref 1.6–8.3)
NEUTROPHILS NFR BLD AUTO: 70 %
NITRATE UR QL: NEGATIVE
NRBC # BLD AUTO: 0 10E3/UL
NRBC BLD AUTO-RTO: 0 /100
PH UR STRIP: 6 [PH] (ref 5–7)
PLATELET # BLD AUTO: 142 10E3/UL (ref 150–450)
RBC # BLD AUTO: 3.14 10E6/UL (ref 3.8–5.2)
RBC URINE: 107 /HPF
SP GR UR STRIP: 1.01 (ref 1–1.03)
SQUAMOUS EPITHELIAL: 9 /HPF
UROBILINOGEN UR STRIP-MCNC: NORMAL MG/DL
WBC # BLD AUTO: 9.8 10E3/UL (ref 4–11)
WBC CLUMPS #/AREA URNS HPF: PRESENT /HPF
WBC URINE: >182 /HPF

## 2024-11-26 PROCEDURE — 82247 BILIRUBIN TOTAL: CPT | Performed by: EMERGENCY MEDICINE

## 2024-11-26 PROCEDURE — 96361 HYDRATE IV INFUSION ADD-ON: CPT | Performed by: EMERGENCY MEDICINE

## 2024-11-26 PROCEDURE — 84450 TRANSFERASE (AST) (SGOT): CPT | Performed by: EMERGENCY MEDICINE

## 2024-11-26 PROCEDURE — 258N000003 HC RX IP 258 OP 636: Performed by: EMERGENCY MEDICINE

## 2024-11-26 PROCEDURE — 36591 DRAW BLOOD OFF VENOUS DEVICE: CPT | Performed by: EMERGENCY MEDICINE

## 2024-11-26 PROCEDURE — 258N000001 HC RX 258: Performed by: EMERGENCY MEDICINE

## 2024-11-26 PROCEDURE — 85025 COMPLETE CBC W/AUTO DIFF WBC: CPT | Performed by: EMERGENCY MEDICINE

## 2024-11-26 PROCEDURE — 83605 ASSAY OF LACTIC ACID: CPT | Performed by: EMERGENCY MEDICINE

## 2024-11-26 PROCEDURE — 93010 ELECTROCARDIOGRAM REPORT: CPT | Performed by: EMERGENCY MEDICINE

## 2024-11-26 PROCEDURE — 93005 ELECTROCARDIOGRAM TRACING: CPT | Performed by: EMERGENCY MEDICINE

## 2024-11-26 PROCEDURE — 81001 URINALYSIS AUTO W/SCOPE: CPT | Performed by: EMERGENCY MEDICINE

## 2024-11-26 PROCEDURE — 99285 EMERGENCY DEPT VISIT HI MDM: CPT | Performed by: EMERGENCY MEDICINE

## 2024-11-26 PROCEDURE — 82962 GLUCOSE BLOOD TEST: CPT

## 2024-11-26 PROCEDURE — 96375 TX/PRO/DX INJ NEW DRUG ADDON: CPT | Performed by: EMERGENCY MEDICINE

## 2024-11-26 PROCEDURE — 83735 ASSAY OF MAGNESIUM: CPT

## 2024-11-26 PROCEDURE — 84155 ASSAY OF PROTEIN SERUM: CPT | Performed by: EMERGENCY MEDICINE

## 2024-11-26 PROCEDURE — 84145 PROCALCITONIN (PCT): CPT

## 2024-11-26 PROCEDURE — 87086 URINE CULTURE/COLONY COUNT: CPT | Performed by: EMERGENCY MEDICINE

## 2024-11-26 PROCEDURE — 99285 EMERGENCY DEPT VISIT HI MDM: CPT | Mod: 25 | Performed by: EMERGENCY MEDICINE

## 2024-11-26 RX ORDER — DEXTROSE MONOHYDRATE 25 G/50ML
50 INJECTION, SOLUTION INTRAVENOUS ONCE
Status: COMPLETED | OUTPATIENT
Start: 2024-11-26 | End: 2024-11-26

## 2024-11-26 RX ADMIN — DEXTROSE MONOHYDRATE 50 ML: 25 INJECTION, SOLUTION INTRAVENOUS at 22:45

## 2024-11-26 RX ADMIN — SODIUM CHLORIDE 1000 ML: 9 INJECTION, SOLUTION INTRAVENOUS at 22:52

## 2024-11-26 RX ADMIN — DEXTROSE MONOHYDRATE 50 ML: 25 INJECTION, SOLUTION INTRAVENOUS at 23:03

## 2024-11-26 ASSESSMENT — ACTIVITIES OF DAILY LIVING (ADL): ADLS_ACUITY_SCORE: 60

## 2024-11-27 ENCOUNTER — APPOINTMENT (OUTPATIENT)
Dept: PHYSICAL THERAPY | Facility: CLINIC | Age: 69
DRG: 698 | End: 2024-11-27
Payer: COMMERCIAL

## 2024-11-27 ENCOUNTER — APPOINTMENT (OUTPATIENT)
Dept: ULTRASOUND IMAGING | Facility: CLINIC | Age: 69
DRG: 698 | End: 2024-11-27
Payer: COMMERCIAL

## 2024-11-27 ENCOUNTER — APPOINTMENT (OUTPATIENT)
Dept: CT IMAGING | Facility: CLINIC | Age: 69
End: 2024-11-27
Attending: EMERGENCY MEDICINE
Payer: COMMERCIAL

## 2024-11-27 ENCOUNTER — APPOINTMENT (OUTPATIENT)
Dept: OCCUPATIONAL THERAPY | Facility: CLINIC | Age: 69
DRG: 698 | End: 2024-11-27
Payer: COMMERCIAL

## 2024-11-27 PROBLEM — A41.9 SEPSIS, DUE TO UNSPECIFIED ORGANISM, UNSPECIFIED WHETHER ACUTE ORGAN DYSFUNCTION PRESENT (H): Status: ACTIVE | Noted: 2024-11-27

## 2024-11-27 PROBLEM — E16.2 HYPOGLYCEMIA: Status: ACTIVE | Noted: 2024-11-27

## 2024-11-27 LAB
ALBUMIN SERPL BCG-MCNC: 1.8 G/DL (ref 3.5–5.2)
ALBUMIN SERPL BCG-MCNC: 1.8 G/DL (ref 3.5–5.2)
ALP SERPL-CCNC: 134 U/L (ref 40–150)
ALP SERPL-CCNC: 141 U/L (ref 40–150)
ALT SERPL W P-5'-P-CCNC: 15 U/L (ref 0–50)
ALT SERPL W P-5'-P-CCNC: 19 U/L (ref 0–50)
ANION GAP SERPL CALCULATED.3IONS-SCNC: 10 MMOL/L (ref 7–15)
ANION GAP SERPL CALCULATED.3IONS-SCNC: 10 MMOL/L (ref 7–15)
ANION GAP SERPL CALCULATED.3IONS-SCNC: 9 MMOL/L (ref 7–15)
AST SERPL W P-5'-P-CCNC: 26 U/L (ref 0–45)
AST SERPL W P-5'-P-CCNC: ABNORMAL U/L
ATRIAL RATE - MUSE: 53 BPM
ATRIAL RATE - MUSE: 64 BPM
BACTERIA UR CULT: NORMAL
BASE EXCESS BLDV CALC-SCNC: 2.8 MMOL/L (ref -3–3)
BASE EXCESS BLDV CALC-SCNC: 2.9 MMOL/L (ref -3–3)
BASE EXCESS BLDV CALC-SCNC: 3.2 MMOL/L (ref -3–3)
BILIRUB SERPL-MCNC: 0.4 MG/DL
BILIRUB SERPL-MCNC: 0.4 MG/DL
BUN SERPL-MCNC: 19.7 MG/DL (ref 8–23)
BUN SERPL-MCNC: 20.5 MG/DL (ref 8–23)
BUN SERPL-MCNC: 20.8 MG/DL (ref 8–23)
C TRACH DNA SPEC QL PROBE+SIG AMP: NEGATIVE
CA-I BLD-MCNC: 3.7 MG/DL (ref 4.4–5.2)
CALCIUM SERPL-MCNC: 7.2 MG/DL (ref 8.8–10.4)
CALCIUM SERPL-MCNC: 7.3 MG/DL (ref 8.8–10.4)
CALCIUM SERPL-MCNC: 7.4 MG/DL (ref 8.8–10.4)
CHLORIDE SERPL-SCNC: 100 MMOL/L (ref 98–107)
CHLORIDE SERPL-SCNC: 99 MMOL/L (ref 98–107)
CHLORIDE SERPL-SCNC: 99 MMOL/L (ref 98–107)
CLUE CELLS: ABNORMAL
CREAT SERPL-MCNC: 1.63 MG/DL (ref 0.51–0.95)
CREAT SERPL-MCNC: 1.67 MG/DL (ref 0.51–0.95)
CREAT SERPL-MCNC: 1.79 MG/DL (ref 0.51–0.95)
CRP SERPL-MCNC: 13.3 MG/L
DIASTOLIC BLOOD PRESSURE - MUSE: NORMAL MMHG
DIASTOLIC BLOOD PRESSURE - MUSE: NORMAL MMHG
EGFRCR SERPLBLD CKD-EPI 2021: 30 ML/MIN/1.73M2
EGFRCR SERPLBLD CKD-EPI 2021: 32 ML/MIN/1.73M2
EGFRCR SERPLBLD CKD-EPI 2021: 33 ML/MIN/1.73M2
ERYTHROCYTE [DISTWIDTH] IN BLOOD BY AUTOMATED COUNT: 17.6 % (ref 10–15)
FLUAV RNA SPEC QL NAA+PROBE: NEGATIVE
FLUBV RNA RESP QL NAA+PROBE: NEGATIVE
GLUCOSE BLDC GLUCOMTR-MCNC: 113 MG/DL (ref 70–99)
GLUCOSE BLDC GLUCOMTR-MCNC: 118 MG/DL (ref 70–99)
GLUCOSE BLDC GLUCOMTR-MCNC: 121 MG/DL (ref 70–99)
GLUCOSE BLDC GLUCOMTR-MCNC: 132 MG/DL (ref 70–99)
GLUCOSE BLDC GLUCOMTR-MCNC: 139 MG/DL (ref 70–99)
GLUCOSE BLDC GLUCOMTR-MCNC: 152 MG/DL (ref 70–99)
GLUCOSE BLDC GLUCOMTR-MCNC: 153 MG/DL (ref 70–99)
GLUCOSE BLDC GLUCOMTR-MCNC: 165 MG/DL (ref 70–99)
GLUCOSE BLDC GLUCOMTR-MCNC: 80 MG/DL (ref 70–99)
GLUCOSE BLDC GLUCOMTR-MCNC: 98 MG/DL (ref 70–99)
GLUCOSE SERPL-MCNC: 110 MG/DL (ref 70–99)
GLUCOSE SERPL-MCNC: 128 MG/DL (ref 70–99)
GLUCOSE SERPL-MCNC: 147 MG/DL (ref 70–99)
HCO3 BLDV-SCNC: 27 MMOL/L (ref 21–28)
HCO3 BLDV-SCNC: 28 MMOL/L (ref 21–28)
HCO3 BLDV-SCNC: 28 MMOL/L (ref 21–28)
HCO3 SERPL-SCNC: 23 MMOL/L (ref 22–29)
HCO3 SERPL-SCNC: 23 MMOL/L (ref 22–29)
HCO3 SERPL-SCNC: 25 MMOL/L (ref 22–29)
HCT VFR BLD AUTO: 29.6 % (ref 35–47)
HGB BLD-MCNC: 9.6 G/DL (ref 11.7–15.7)
INR PPP: 1.09 (ref 0.85–1.15)
INTERPRETATION ECG - MUSE: NORMAL
INTERPRETATION ECG - MUSE: NORMAL
LACTATE SERPL-SCNC: 1.4 MMOL/L (ref 0.7–2)
LACTATE SERPL-SCNC: 1.8 MMOL/L (ref 0.7–2)
LACTATE SERPL-SCNC: 2.4 MMOL/L (ref 0.7–2)
LVEF ECHO: NORMAL
MAGNESIUM SERPL-MCNC: 1.6 MG/DL (ref 1.7–2.3)
MAGNESIUM SERPL-MCNC: 2.2 MG/DL (ref 1.7–2.3)
MCH RBC QN AUTO: 30.8 PG (ref 26.5–33)
MCHC RBC AUTO-ENTMCNC: 32.4 G/DL (ref 31.5–36.5)
MCV RBC AUTO: 95 FL (ref 78–100)
MRSA DNA SPEC QL NAA+PROBE: NEGATIVE
N GONORRHOEA DNA SPEC QL NAA+PROBE: NEGATIVE
O2/TOTAL GAS SETTING VFR VENT: 21 %
O2/TOTAL GAS SETTING VFR VENT: 21 %
O2/TOTAL GAS SETTING VFR VENT: 22 %
OXYHGB MFR BLDV: 49 % (ref 70–75)
OXYHGB MFR BLDV: 51 % (ref 70–75)
OXYHGB MFR BLDV: 79 % (ref 70–75)
P AXIS - MUSE: 72 DEGREES
P AXIS - MUSE: 74 DEGREES
PCO2 BLDV: 41 MM HG (ref 40–50)
PCO2 BLDV: 44 MM HG (ref 40–50)
PCO2 BLDV: 45 MM HG (ref 40–50)
PH BLDV: 7.41 [PH] (ref 7.32–7.43)
PH BLDV: 7.41 [PH] (ref 7.32–7.43)
PH BLDV: 7.43 [PH] (ref 7.32–7.43)
PHOSPHATE SERPL-MCNC: 3.5 MG/DL (ref 2.5–4.5)
PLATELET # BLD AUTO: 151 10E3/UL (ref 150–450)
PO2 BLDV: 31 MM HG (ref 25–47)
PO2 BLDV: 32 MM HG (ref 25–47)
PO2 BLDV: 48 MM HG (ref 25–47)
POTASSIUM SERPL-SCNC: 2.9 MMOL/L (ref 3.4–5.3)
POTASSIUM SERPL-SCNC: 3.6 MMOL/L (ref 3.4–5.3)
POTASSIUM SERPL-SCNC: 4.3 MMOL/L (ref 3.4–5.3)
PR INTERVAL - MUSE: 154 MS
PR INTERVAL - MUSE: 154 MS
PROCALCITONIN SERPL IA-MCNC: 0.1 NG/ML
PROT SERPL-MCNC: 4.1 G/DL (ref 6.4–8.3)
PROT SERPL-MCNC: 4.5 G/DL (ref 6.4–8.3)
QRS DURATION - MUSE: 70 MS
QRS DURATION - MUSE: 72 MS
QT - MUSE: 424 MS
QT - MUSE: 476 MS
QTC - MUSE: 397 MS
QTC - MUSE: 491 MS
R AXIS - MUSE: -13 DEGREES
R AXIS - MUSE: -2 DEGREES
RBC # BLD AUTO: 3.12 10E6/UL (ref 3.8–5.2)
RSV RNA SPEC NAA+PROBE: NEGATIVE
SA TARGET DNA: NEGATIVE
SAO2 % BLDV: 49.8 % (ref 70–75)
SAO2 % BLDV: 51.6 % (ref 70–75)
SAO2 % BLDV: 80.8 % (ref 70–75)
SARS-COV-2 RNA RESP QL NAA+PROBE: NEGATIVE
SODIUM SERPL-SCNC: 131 MMOL/L (ref 135–145)
SODIUM SERPL-SCNC: 132 MMOL/L (ref 135–145)
SODIUM SERPL-SCNC: 135 MMOL/L (ref 135–145)
SYSTOLIC BLOOD PRESSURE - MUSE: NORMAL MMHG
SYSTOLIC BLOOD PRESSURE - MUSE: NORMAL MMHG
T AXIS - MUSE: 17 DEGREES
T AXIS - MUSE: 7 DEGREES
TRICHOMONAS, WET PREP: ABNORMAL
VENTRICULAR RATE- MUSE: 53 BPM
VENTRICULAR RATE- MUSE: 64 BPM
WBC # BLD AUTO: 11 10E3/UL (ref 4–11)
WBC'S/HIGH POWER FIELD, WET PREP: ABNORMAL
YEAST, WET PREP: ABNORMAL

## 2024-11-27 PROCEDURE — 80069 RENAL FUNCTION PANEL: CPT

## 2024-11-27 PROCEDURE — 93005 ELECTROCARDIOGRAM TRACING: CPT

## 2024-11-27 PROCEDURE — 84100 ASSAY OF PHOSPHORUS: CPT

## 2024-11-27 PROCEDURE — 71250 CT THORAX DX C-: CPT | Mod: 26 | Performed by: RADIOLOGY

## 2024-11-27 PROCEDURE — 97162 PT EVAL MOD COMPLEX 30 MIN: CPT | Mod: GP

## 2024-11-27 PROCEDURE — 250N000011 HC RX IP 250 OP 636: Performed by: EMERGENCY MEDICINE

## 2024-11-27 PROCEDURE — 87040 BLOOD CULTURE FOR BACTERIA: CPT | Performed by: EMERGENCY MEDICINE

## 2024-11-27 PROCEDURE — 85027 COMPLETE CBC AUTOMATED: CPT

## 2024-11-27 PROCEDURE — 36415 COLL VENOUS BLD VENIPUNCTURE: CPT | Performed by: EMERGENCY MEDICINE

## 2024-11-27 PROCEDURE — 87491 CHLMYD TRACH DNA AMP PROBE: CPT

## 2024-11-27 PROCEDURE — 82805 BLOOD GASES W/O2 SATURATION: CPT

## 2024-11-27 PROCEDURE — 76705 ECHO EXAM OF ABDOMEN: CPT | Mod: 26 | Performed by: RADIOLOGY

## 2024-11-27 PROCEDURE — 258N000003 HC RX IP 258 OP 636

## 2024-11-27 PROCEDURE — 87210 SMEAR WET MOUNT SALINE/INK: CPT

## 2024-11-27 PROCEDURE — 96367 TX/PROPH/DG ADDL SEQ IV INF: CPT | Performed by: EMERGENCY MEDICINE

## 2024-11-27 PROCEDURE — 87637 SARSCOV2&INF A&B&RSV AMP PRB: CPT

## 2024-11-27 PROCEDURE — 76705 ECHO EXAM OF ABDOMEN: CPT

## 2024-11-27 PROCEDURE — 71250 CT THORAX DX C-: CPT

## 2024-11-27 PROCEDURE — 250N000013 HC RX MED GY IP 250 OP 250 PS 637

## 2024-11-27 PROCEDURE — 93010 ELECTROCARDIOGRAM REPORT: CPT | Performed by: INTERNAL MEDICINE

## 2024-11-27 PROCEDURE — 87641 MR-STAPH DNA AMP PROBE: CPT

## 2024-11-27 PROCEDURE — 99291 CRITICAL CARE FIRST HOUR: CPT | Mod: GC | Performed by: INTERNAL MEDICINE

## 2024-11-27 PROCEDURE — 250N000011 HC RX IP 250 OP 636: Performed by: INTERNAL MEDICINE

## 2024-11-27 PROCEDURE — 83605 ASSAY OF LACTIC ACID: CPT

## 2024-11-27 PROCEDURE — 250N000013 HC RX MED GY IP 250 OP 250 PS 637: Performed by: EMERGENCY MEDICINE

## 2024-11-27 PROCEDURE — 3E043XZ INTRODUCTION OF VASOPRESSOR INTO CENTRAL VEIN, PERCUTANEOUS APPROACH: ICD-10-PCS | Performed by: OBSTETRICS & GYNECOLOGY

## 2024-11-27 PROCEDURE — 87040 BLOOD CULTURE FOR BACTERIA: CPT

## 2024-11-27 PROCEDURE — 99255 IP/OBS CONSLTJ NEW/EST HI 80: CPT | Performed by: INTERNAL MEDICINE

## 2024-11-27 PROCEDURE — 258N000003 HC RX IP 258 OP 636: Performed by: OBSTETRICS & GYNECOLOGY

## 2024-11-27 PROCEDURE — 86140 C-REACTIVE PROTEIN: CPT | Performed by: PHYSICIAN ASSISTANT

## 2024-11-27 PROCEDURE — 82040 ASSAY OF SERUM ALBUMIN: CPT

## 2024-11-27 PROCEDURE — 74176 CT ABD & PELVIS W/O CONTRAST: CPT | Mod: 26 | Performed by: RADIOLOGY

## 2024-11-27 PROCEDURE — 97530 THERAPEUTIC ACTIVITIES: CPT | Mod: GP

## 2024-11-27 PROCEDURE — 84155 ASSAY OF PROTEIN SERUM: CPT

## 2024-11-27 PROCEDURE — 200N000002 HC R&B ICU UMMC

## 2024-11-27 PROCEDURE — 82330 ASSAY OF CALCIUM: CPT

## 2024-11-27 PROCEDURE — 250N000011 HC RX IP 250 OP 636

## 2024-11-27 PROCEDURE — 82962 GLUCOSE BLOOD TEST: CPT

## 2024-11-27 PROCEDURE — 250N000012 HC RX MED GY IP 250 OP 636 PS 637

## 2024-11-27 PROCEDURE — 250N000011 HC RX IP 250 OP 636: Performed by: OBSTETRICS & GYNECOLOGY

## 2024-11-27 PROCEDURE — 258N000003 HC RX IP 258 OP 636: Performed by: EMERGENCY MEDICINE

## 2024-11-27 PROCEDURE — G0463 HOSPITAL OUTPT CLINIC VISIT: HCPCS

## 2024-11-27 PROCEDURE — 36415 COLL VENOUS BLD VENIPUNCTURE: CPT

## 2024-11-27 PROCEDURE — 83735 ASSAY OF MAGNESIUM: CPT

## 2024-11-27 PROCEDURE — 97166 OT EVAL MOD COMPLEX 45 MIN: CPT | Mod: GO

## 2024-11-27 PROCEDURE — 258N000001 HC RX 258

## 2024-11-27 PROCEDURE — 250N000009 HC RX 250: Performed by: EMERGENCY MEDICINE

## 2024-11-27 PROCEDURE — 85610 PROTHROMBIN TIME: CPT

## 2024-11-27 PROCEDURE — 82247 BILIRUBIN TOTAL: CPT

## 2024-11-27 PROCEDURE — 999N000248 HC STATISTIC IV INSERT WITH US BY RN

## 2024-11-27 PROCEDURE — 99207 PR NO BILLABLE SERVICE THIS VISIT: CPT | Performed by: INTERNAL MEDICINE

## 2024-11-27 PROCEDURE — 96365 THER/PROPH/DIAG IV INF INIT: CPT | Performed by: EMERGENCY MEDICINE

## 2024-11-27 PROCEDURE — 96361 HYDRATE IV INFUSION ADD-ON: CPT | Performed by: EMERGENCY MEDICINE

## 2024-11-27 PROCEDURE — 82310 ASSAY OF CALCIUM: CPT

## 2024-11-27 PROCEDURE — 97530 THERAPEUTIC ACTIVITIES: CPT | Mod: GO

## 2024-11-27 RX ORDER — TIZANIDINE 2 MG/1
2 TABLET ORAL EVERY 6 HOURS PRN
Status: DISCONTINUED | OUTPATIENT
Start: 2024-11-27 | End: 2024-12-08 | Stop reason: HOSPADM

## 2024-11-27 RX ORDER — CALCIUM GLUCONATE 20 MG/ML
2 INJECTION, SOLUTION INTRAVENOUS ONCE
Status: COMPLETED | OUTPATIENT
Start: 2024-11-27 | End: 2024-11-27

## 2024-11-27 RX ORDER — ONDANSETRON 4 MG/1
4 TABLET, ORALLY DISINTEGRATING ORAL EVERY 6 HOURS PRN
Status: DISCONTINUED | OUTPATIENT
Start: 2024-11-27 | End: 2024-12-08 | Stop reason: HOSPADM

## 2024-11-27 RX ORDER — POLYETHYLENE GLYCOL 3350 17 G/17G
17 POWDER, FOR SOLUTION ORAL 2 TIMES DAILY PRN
Status: DISCONTINUED | OUTPATIENT
Start: 2024-11-27 | End: 2024-12-08 | Stop reason: HOSPADM

## 2024-11-27 RX ORDER — GABAPENTIN 100 MG/1
200 CAPSULE ORAL 3 TIMES DAILY
Status: DISCONTINUED | OUTPATIENT
Start: 2024-11-27 | End: 2024-12-08 | Stop reason: HOSPADM

## 2024-11-27 RX ORDER — HYDROMORPHONE HYDROCHLORIDE 1 MG/ML
0.3 INJECTION, SOLUTION INTRAMUSCULAR; INTRAVENOUS; SUBCUTANEOUS EVERY 4 HOURS PRN
Status: DISCONTINUED | OUTPATIENT
Start: 2024-11-27 | End: 2024-11-27

## 2024-11-27 RX ORDER — POTASSIUM CHLORIDE 29.8 MG/ML
20 INJECTION INTRAVENOUS
Status: DISCONTINUED | OUTPATIENT
Start: 2024-11-27 | End: 2024-11-27

## 2024-11-27 RX ORDER — DEXTROSE MONOHYDRATE 25 G/50ML
25-50 INJECTION, SOLUTION INTRAVENOUS
Status: DISCONTINUED | OUTPATIENT
Start: 2024-11-27 | End: 2024-11-27

## 2024-11-27 RX ORDER — HYDROCORTISONE SODIUM SUCCINATE 100 MG/2ML
50 INJECTION INTRAMUSCULAR; INTRAVENOUS EVERY 12 HOURS
Status: COMPLETED | OUTPATIENT
Start: 2024-11-29 | End: 2024-11-30

## 2024-11-27 RX ORDER — QUETIAPINE FUMARATE 100 MG/1
400 TABLET, FILM COATED ORAL 2 TIMES DAILY
Status: DISCONTINUED | OUTPATIENT
Start: 2024-11-27 | End: 2024-11-30

## 2024-11-27 RX ORDER — POLYETHYLENE GLYCOL 3350 17 G/17G
17 POWDER, FOR SOLUTION ORAL DAILY
Status: DISCONTINUED | OUTPATIENT
Start: 2024-11-27 | End: 2024-11-28

## 2024-11-27 RX ORDER — HYDROCORTISONE SODIUM SUCCINATE 100 MG/2ML
50 INJECTION INTRAMUSCULAR; INTRAVENOUS EVERY 8 HOURS
Status: COMPLETED | OUTPATIENT
Start: 2024-11-28 | End: 2024-11-28

## 2024-11-27 RX ORDER — HYDROCORTISONE SODIUM SUCCINATE 100 MG/2ML
50 INJECTION INTRAMUSCULAR; INTRAVENOUS EVERY 12 HOURS
Status: DISCONTINUED | OUTPATIENT
Start: 2024-12-02 | End: 2024-12-01

## 2024-11-27 RX ORDER — POTASSIUM CHLORIDE 7.45 MG/ML
10 INJECTION INTRAVENOUS ONCE
Status: COMPLETED | OUTPATIENT
Start: 2024-11-27 | End: 2024-11-27

## 2024-11-27 RX ORDER — ONDANSETRON 2 MG/ML
4 INJECTION INTRAMUSCULAR; INTRAVENOUS EVERY 6 HOURS PRN
Status: DISCONTINUED | OUTPATIENT
Start: 2024-11-27 | End: 2024-12-08 | Stop reason: HOSPADM

## 2024-11-27 RX ORDER — ACETAMINOPHEN 325 MG/1
650 TABLET ORAL EVERY 6 HOURS PRN
Status: DISCONTINUED | OUTPATIENT
Start: 2024-11-27 | End: 2024-11-27

## 2024-11-27 RX ORDER — BENZTROPINE MESYLATE 1 MG/1
1 TABLET ORAL 2 TIMES DAILY
Status: ON HOLD | COMMUNITY
End: 2024-12-08

## 2024-11-27 RX ORDER — NICOTINE POLACRILEX 4 MG
15-30 LOZENGE BUCCAL
Status: DISCONTINUED | OUTPATIENT
Start: 2024-11-27 | End: 2024-11-27

## 2024-11-27 RX ORDER — MULTIVIT WITH IRON,MINERALS
1 TABLET,CHEWABLE ORAL DAILY
Status: DISCONTINUED | OUTPATIENT
Start: 2024-11-28 | End: 2024-11-28

## 2024-11-27 RX ORDER — BENZTROPINE MESYLATE 0.5 MG/1
1 TABLET ORAL DAILY
Status: DISCONTINUED | OUTPATIENT
Start: 2024-11-27 | End: 2024-11-28

## 2024-11-27 RX ORDER — CALCIUM GLUCONATE 20 MG/ML
2 INJECTION, SOLUTION INTRAVENOUS ONCE
Status: DISCONTINUED | OUTPATIENT
Start: 2024-11-27 | End: 2024-11-27

## 2024-11-27 RX ORDER — HYDROCORTISONE SODIUM SUCCINATE 100 MG/2ML
50 INJECTION INTRAMUSCULAR; INTRAVENOUS EVERY 6 HOURS
Status: COMPLETED | OUTPATIENT
Start: 2024-11-27 | End: 2024-11-27

## 2024-11-27 RX ORDER — SERTRALINE HYDROCHLORIDE 100 MG/1
100 TABLET, FILM COATED ORAL DAILY
Status: ON HOLD | COMMUNITY
End: 2024-12-08

## 2024-11-27 RX ORDER — HYDROMORPHONE HYDROCHLORIDE 1 MG/ML
.5-1 INJECTION, SOLUTION INTRAMUSCULAR; INTRAVENOUS; SUBCUTANEOUS
Status: DISCONTINUED | OUTPATIENT
Start: 2024-11-27 | End: 2024-11-27

## 2024-11-27 RX ORDER — PROCHLORPERAZINE MALEATE 5 MG/1
5 TABLET ORAL EVERY 6 HOURS PRN
Status: DISCONTINUED | OUTPATIENT
Start: 2024-11-27 | End: 2024-12-08 | Stop reason: HOSPADM

## 2024-11-27 RX ORDER — NALOXONE HYDROCHLORIDE 0.4 MG/ML
0.4 INJECTION, SOLUTION INTRAMUSCULAR; INTRAVENOUS; SUBCUTANEOUS
Status: DISCONTINUED | OUTPATIENT
Start: 2024-11-27 | End: 2024-12-08 | Stop reason: HOSPADM

## 2024-11-27 RX ORDER — NOREPINEPHRINE BITARTRATE 0.06 MG/ML
.01-.6 INJECTION, SOLUTION INTRAVENOUS CONTINUOUS
Status: DISCONTINUED | OUTPATIENT
Start: 2024-11-27 | End: 2024-12-02

## 2024-11-27 RX ORDER — HYDROMORPHONE HYDROCHLORIDE 1 MG/ML
0.3 INJECTION, SOLUTION INTRAMUSCULAR; INTRAVENOUS; SUBCUTANEOUS EVERY 4 HOURS PRN
Status: DISCONTINUED | OUTPATIENT
Start: 2024-11-27 | End: 2024-11-28

## 2024-11-27 RX ORDER — HYDROMORPHONE HCL IN WATER/PF 6 MG/30 ML
0.2 PATIENT CONTROLLED ANALGESIA SYRINGE INTRAVENOUS EVERY 4 HOURS PRN
Status: DISCONTINUED | OUTPATIENT
Start: 2024-11-27 | End: 2024-11-27

## 2024-11-27 RX ORDER — VANCOMYCIN HYDROCHLORIDE
1500 ONCE
Status: COMPLETED | OUTPATIENT
Start: 2024-11-27 | End: 2024-11-27

## 2024-11-27 RX ORDER — NICOTINE POLACRILEX 4 MG
15-30 LOZENGE BUCCAL
Status: DISCONTINUED | OUTPATIENT
Start: 2024-11-27 | End: 2024-12-08 | Stop reason: HOSPADM

## 2024-11-27 RX ORDER — CALCIUM CARBONATE/VITAMIN D3 600 MG-10
1 TABLET ORAL 2 TIMES DAILY WITH MEALS
Status: DISCONTINUED | OUTPATIENT
Start: 2024-11-28 | End: 2024-11-28

## 2024-11-27 RX ORDER — POTASSIUM CHLORIDE 7.45 MG/ML
10 INJECTION INTRAVENOUS
Status: COMPLETED | OUTPATIENT
Start: 2024-11-27 | End: 2024-11-27

## 2024-11-27 RX ORDER — ERTAPENEM 1 G/1
1 INJECTION, POWDER, LYOPHILIZED, FOR SOLUTION INTRAMUSCULAR; INTRAVENOUS EVERY 12 HOURS
Status: DISCONTINUED | OUTPATIENT
Start: 2024-11-27 | End: 2024-11-27

## 2024-11-27 RX ORDER — BISACODYL 10 MG
10 SUPPOSITORY, RECTAL RECTAL DAILY PRN
Status: DISCONTINUED | OUTPATIENT
Start: 2024-11-27 | End: 2024-12-08 | Stop reason: HOSPADM

## 2024-11-27 RX ORDER — NALOXONE HYDROCHLORIDE 0.4 MG/ML
0.2 INJECTION, SOLUTION INTRAMUSCULAR; INTRAVENOUS; SUBCUTANEOUS
Status: DISCONTINUED | OUTPATIENT
Start: 2024-11-27 | End: 2024-12-08 | Stop reason: HOSPADM

## 2024-11-27 RX ORDER — ACETAMINOPHEN 325 MG/1
650 TABLET ORAL EVERY 4 HOURS PRN
Status: DISCONTINUED | OUTPATIENT
Start: 2024-11-27 | End: 2024-11-29

## 2024-11-27 RX ORDER — AMOXICILLIN 250 MG
1 CAPSULE ORAL 2 TIMES DAILY PRN
Status: DISCONTINUED | OUTPATIENT
Start: 2024-11-27 | End: 2024-12-01

## 2024-11-27 RX ORDER — POTASSIUM CHLORIDE 750 MG/1
40 TABLET, EXTENDED RELEASE ORAL ONCE
Status: DISCONTINUED | OUTPATIENT
Start: 2024-11-27 | End: 2024-11-27

## 2024-11-27 RX ORDER — POTASSIUM CHLORIDE 1.5 G/1.58G
20 POWDER, FOR SOLUTION ORAL ONCE
Status: COMPLETED | OUTPATIENT
Start: 2024-11-27 | End: 2024-11-27

## 2024-11-27 RX ORDER — ATENOLOL 25 MG/1
12.5 TABLET ORAL DAILY
Status: DISCONTINUED | OUTPATIENT
Start: 2024-11-27 | End: 2024-12-08 | Stop reason: HOSPADM

## 2024-11-27 RX ORDER — SODIUM CHLORIDE, SODIUM LACTATE, POTASSIUM CHLORIDE, CALCIUM CHLORIDE 600; 310; 30; 20 MG/100ML; MG/100ML; MG/100ML; MG/100ML
INJECTION, SOLUTION INTRAVENOUS CONTINUOUS
Status: ACTIVE | OUTPATIENT
Start: 2024-11-27 | End: 2024-11-27

## 2024-11-27 RX ORDER — DEXTROSE MONOHYDRATE 100 MG/ML
INJECTION, SOLUTION INTRAVENOUS CONTINUOUS
Status: DISCONTINUED | OUTPATIENT
Start: 2024-11-27 | End: 2024-11-27

## 2024-11-27 RX ORDER — ALBUTEROL SULFATE 90 UG/1
1-2 INHALANT RESPIRATORY (INHALATION) EVERY 4 HOURS PRN
Status: DISCONTINUED | OUTPATIENT
Start: 2024-11-27 | End: 2024-12-08 | Stop reason: HOSPADM

## 2024-11-27 RX ORDER — HYDROCORTISONE SODIUM SUCCINATE 100 MG/2ML
50 INJECTION INTRAMUSCULAR; INTRAVENOUS EVERY 12 HOURS
Status: COMPLETED | OUTPATIENT
Start: 2024-11-30 | End: 2024-12-01

## 2024-11-27 RX ORDER — TIZANIDINE 2 MG/1
2 TABLET ORAL 3 TIMES DAILY PRN
Status: ON HOLD | COMMUNITY
End: 2024-12-08

## 2024-11-27 RX ORDER — OXYCODONE HYDROCHLORIDE 5 MG/1
5 TABLET ORAL EVERY 6 HOURS PRN
Status: DISCONTINUED | OUTPATIENT
Start: 2024-11-27 | End: 2024-11-30

## 2024-11-27 RX ORDER — SODIUM CHLORIDE 9 MG/ML
INJECTION, SOLUTION INTRAVENOUS CONTINUOUS
Status: DISCONTINUED | OUTPATIENT
Start: 2024-11-27 | End: 2024-11-27

## 2024-11-27 RX ORDER — MULTIVITAMIN WITH IRON
500 TABLET ORAL DAILY
Status: DISCONTINUED | OUTPATIENT
Start: 2024-11-28 | End: 2024-11-28

## 2024-11-27 RX ORDER — AMOXICILLIN 250 MG
2 CAPSULE ORAL 2 TIMES DAILY PRN
Status: DISCONTINUED | OUTPATIENT
Start: 2024-11-27 | End: 2024-12-01

## 2024-11-27 RX ORDER — MAGNESIUM SULFATE HEPTAHYDRATE 40 MG/ML
2 INJECTION, SOLUTION INTRAVENOUS ONCE
Status: COMPLETED | OUTPATIENT
Start: 2024-11-27 | End: 2024-11-27

## 2024-11-27 RX ORDER — DEXTROSE MONOHYDRATE 25 G/50ML
25-50 INJECTION, SOLUTION INTRAVENOUS
Status: DISCONTINUED | OUTPATIENT
Start: 2024-11-27 | End: 2024-12-08 | Stop reason: HOSPADM

## 2024-11-27 RX ADMIN — BENZTROPINE MESYLATE 1 MG: 0.5 TABLET ORAL at 11:15

## 2024-11-27 RX ADMIN — SODIUM CHLORIDE, POTASSIUM CHLORIDE, SODIUM LACTATE AND CALCIUM CHLORIDE 500 ML: 600; 310; 30; 20 INJECTION, SOLUTION INTRAVENOUS at 08:59

## 2024-11-27 RX ADMIN — SODIUM CHLORIDE 500 ML: 9 INJECTION, SOLUTION INTRAVENOUS at 01:32

## 2024-11-27 RX ADMIN — ERTAPENEM 500 MG: 1 INJECTION INTRAMUSCULAR; INTRAVENOUS at 06:01

## 2024-11-27 RX ADMIN — SODIUM CHLORIDE: 9 INJECTION, SOLUTION INTRAVENOUS at 03:00

## 2024-11-27 RX ADMIN — NALOXONE HYDROCHLORIDE 0.4 MG: 0.4 INJECTION, SOLUTION INTRAMUSCULAR; INTRAVENOUS; SUBCUTANEOUS at 20:37

## 2024-11-27 RX ADMIN — INSULIN ASPART 1 UNITS: 100 INJECTION, SOLUTION INTRAVENOUS; SUBCUTANEOUS at 17:01

## 2024-11-27 RX ADMIN — POTASSIUM CHLORIDE 10 MEQ: 7.46 INJECTION, SOLUTION INTRAVENOUS at 18:52

## 2024-11-27 RX ADMIN — SODIUM CHLORIDE 500 ML: 9 INJECTION, SOLUTION INTRAVENOUS at 00:59

## 2024-11-27 RX ADMIN — SODIUM CHLORIDE, POTASSIUM CHLORIDE, SODIUM LACTATE AND CALCIUM CHLORIDE: 600; 310; 30; 20 INJECTION, SOLUTION INTRAVENOUS at 06:36

## 2024-11-27 RX ADMIN — HYDROMORPHONE HYDROCHLORIDE 1 MG: 1 INJECTION, SOLUTION INTRAMUSCULAR; INTRAVENOUS; SUBCUTANEOUS at 11:14

## 2024-11-27 RX ADMIN — OXYCODONE HYDROCHLORIDE 5 MG: 5 TABLET ORAL at 08:55

## 2024-11-27 RX ADMIN — NOREPINEPHRINE BITARTRATE 0.03 MCG/KG/MIN: 0.06 INJECTION, SOLUTION INTRAVENOUS at 03:28

## 2024-11-27 RX ADMIN — POTASSIUM CHLORIDE 10 MEQ: 7.46 INJECTION, SOLUTION INTRAVENOUS at 01:44

## 2024-11-27 RX ADMIN — SODIUM CHLORIDE, POTASSIUM CHLORIDE, SODIUM LACTATE AND CALCIUM CHLORIDE 500 ML: 600; 310; 30; 20 INJECTION, SOLUTION INTRAVENOUS at 11:12

## 2024-11-27 RX ADMIN — SERTRALINE HYDROCHLORIDE 150 MG: 100 TABLET ORAL at 11:15

## 2024-11-27 RX ADMIN — DEXTROSE MONOHYDRATE 25 ML/HR: 100 INJECTION, SOLUTION INTRAVENOUS at 11:59

## 2024-11-27 RX ADMIN — PIPERACILLIN SODIUM AND TAZOBACTAM SODIUM 3.38 G: 3; .375 INJECTION, SOLUTION INTRAVENOUS at 00:13

## 2024-11-27 RX ADMIN — HYDROCORTISONE SODIUM SUCCINATE 50 MG: 100 INJECTION, POWDER, FOR SOLUTION INTRAMUSCULAR; INTRAVENOUS at 21:33

## 2024-11-27 RX ADMIN — POTASSIUM CHLORIDE 20 MEQ: 1.5 POWDER, FOR SOLUTION ORAL at 01:11

## 2024-11-27 RX ADMIN — SODIUM CHLORIDE: 9 INJECTION, SOLUTION INTRAVENOUS at 03:38

## 2024-11-27 RX ADMIN — Medication 1500 MG: at 07:00

## 2024-11-27 RX ADMIN — MAGNESIUM SULFATE HEPTAHYDRATE 2 G: 2 INJECTION, SOLUTION INTRAVENOUS at 03:04

## 2024-11-27 RX ADMIN — SODIUM CHLORIDE, POTASSIUM CHLORIDE, SODIUM LACTATE AND CALCIUM CHLORIDE 1000 ML: 600; 310; 30; 20 INJECTION, SOLUTION INTRAVENOUS at 15:06

## 2024-11-27 RX ADMIN — CALCIUM GLUCONATE 2 G: 20 INJECTION, SOLUTION INTRAVENOUS at 08:57

## 2024-11-27 RX ADMIN — HYDROCORTISONE SODIUM SUCCINATE 50 MG: 100 INJECTION, POWDER, FOR SOLUTION INTRAMUSCULAR; INTRAVENOUS at 15:46

## 2024-11-27 RX ADMIN — POTASSIUM CHLORIDE 10 MEQ: 7.46 INJECTION, SOLUTION INTRAVENOUS at 21:28

## 2024-11-27 RX ADMIN — QUETIAPINE FUMARATE 400 MG: 100 TABLET ORAL at 11:15

## 2024-11-27 RX ADMIN — HYDROCORTISONE SODIUM SUCCINATE 50 MG: 100 INJECTION, POWDER, FOR SOLUTION INTRAMUSCULAR; INTRAVENOUS at 11:13

## 2024-11-27 ASSESSMENT — ACTIVITIES OF DAILY LIVING (ADL)
ADLS_ACUITY_SCORE: 60
ADLS_ACUITY_SCORE: 60
ADLS_ACUITY_SCORE: 53
ADLS_ACUITY_SCORE: 60
ADLS_ACUITY_SCORE: 49
ADLS_ACUITY_SCORE: 60
ADLS_ACUITY_SCORE: 53
ADLS_ACUITY_SCORE: 60
ADLS_ACUITY_SCORE: 60

## 2024-11-27 NOTE — PROGRESS NOTES
Admitted/transferred from: ED @ 0500  Reason for admission/transfer: Pressor requirement  Patient status upon admission/transfer: A&Ox4. SB 50s. RA. +4 edema to BLE.  Interventions: Levo infusing.  Plan: titrate levo to MAP goal. Plan of care. Obtain cx from R chest port - unable to draw by writer d/t poor blood return.  2 RN skin assessment: completed by SHOAIB Saenz. SHOAIB Moore.  Sexual Orientation and Gender Identity (SOGI) smartfom completed: Not done  Result of skin assessment and interventions/actions: Sacral PI present on admission. Excoriation present at suprapubic catheter site. Scabbing to L elbow  Height, weight, drug calc weight: Done  Patient belongings (see Flowsheet - Adult Profile for details): clothes, phone, and phone  with pt  MDRO education (if applicable):

## 2024-11-27 NOTE — PROGRESS NOTES
CLINICAL NUTRITION SERVICES - ASSESSMENT NOTE     Nutrition Prescription    RECOMMENDATIONS FOR MDs/PROVIDERS TO ORDER:  Recommend regular diet once able to resume PO.     Malnutrition Status:    Severe malnutrition in the context of chronic illness/disease    Recommendations already ordered by Registered Dietitian (RD):  Due to history of bariatric surgery and poor nutrition status:   Emmatone's complete chewable BID (with 18 mg iron per chew)   Calcium carbonate 600 mg with vitamin D 10 mcg BID   Vitamin B12 500 mcg oral daily   Thiamine 100 mg daily    Future/Additional Recommendations:  Monitor diet for advancement. Once diet allows, recommend addition of Ensure Max daily + Ensure Enlive daily + Magic Cup daily  If chewable multivitamin is difficult for patient, can adjust to tablet and/or capsule option.       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 She reports doing her own grocery shopping/meal prep. She does not use Ensure/Boost or other ONS. She takes some vitamins but was not able to report what they are. She is missing some teeth but reports having dentures and no issues chewing food.     CURRENT NUTRITION ORDERS  Diet: NPO for procedure    Intake/Tolerance: NPO besides a few hours on regular diet.    LABS   11/26/24 22:50 11/27/24 05:50   Sodium 135 132 (L)   Potassium 2.9 (L) 4.3   Chloride 100  "99   Carbon Dioxide (CO2) 25 23   Urea Nitrogen 20.8 20.5   Creatinine 1.79 (H) 1.67 (H)   GFR Estimate 30 (L) 32 (L)   Calcium 7.2 (L) 7.3 (L)   Anion Gap 10 10   Magnesium 1.6 (L) 2.2   Phosphorus  3.5   Albumin 1.8 (L) 1.8 (L)   Protein Total 4.1 (L) 4.5 (L)   Alkaline Phosphatase 134 141   ALT 15 19   AST 26 See Comment   Bilirubin Total 0.4 0.4   Calcium Ionized Whole Blood  3.7 (L)   Glucose 128 (H) 110 (H)   Lactic Acid 1.6 2.4 (H)       MEDICATIONS  Calcium gluconate IV once today  Drips: norepi @ 0.14 mcg/kg/min    ANTHROPOMETRICS  Height: 160 cm (5' 3\")  Most Recent Weight: 72.6 kg (160 lb 0.9 oz)    IBW: 52.3 kg   Body mass index is 28.35 kg/m . BMI Category: Overweight BMI 25-29.9  Weight History:  8.7 kg (11.6%) weight loss over 4 months.  Wt Readings from Last 20 Encounters:   11/27/24 72.6 kg (160 lb 0.9 oz)   11/09/24 63.2 kg (139 lb 5.3 oz)   11/04/24 63.5 kg (140 lb)   10/15/24 63.5 kg (140 lb)   10/10/24 63.5 kg (140 lb)   09/27/24 58.8 kg (129 lb 9.6 oz)   09/09/24 68.9 kg (151 lb 12.8 oz)   09/04/24 63.5 kg (140 lb)   08/27/24 63.5 kg (140 lb)   08/26/24 63.5 kg (140 lb)   08/22/24 63.5 kg (140 lb)   08/20/24 62.3 kg (137 lb 6.4 oz)   08/09/24 67.7 kg (149 lb 3.2 oz)   07/25/24 75 kg (165 lb 6.4 oz)   07/15/24 81.3 kg (179 lb 3.7 oz)   05/28/24 73 kg (161 lb)   05/17/24 74.4 kg (164 lb 0.4 oz)   05/15/24 75.3 kg (166 lb 1.6 oz)   05/15/24 74.8 kg (165 lb)   04/23/24 76.2 kg (168 lb)         ASSESSED NUTRITION NEEDS  Dosing Weight: 57.4 kg (Adjusted BW)   Estimated Energy Needs: 3961-7690 kcals/day (25 - 35 kcal/kg)  Justification: Increased needs, Repletion, and Critical illness  Estimated Protein Needs:  grams protein/day (1.5 - 2 grams of pro/kg)  Justification: Increased needs, Repletion, and Critical illness  Estimated Fluid Needs: 6559-3811 mL/day (25 - 30 mL/kg)   Justification: Maintenance    PHYSICAL FINDINGS  See malnutrition section below.    Per EMR:        MALNUTRITION  % " "Intake: </=75% for >/= 1 month (severe)  % Weight Loss: > 10% in 6 months (severe malnutrition)  Subcutaneous Fat Loss: Facial region:  mild, Upper arm:  mild, Lower arm:  mild, and Thoracic/intercostal:  moderate   Muscle Loss: Temporal:  mild, Thoracic region (clavicle, acromium bone, deltoid, trapezius, pectoral):  moderate, Upper arm (bicep, tricep):  mild, and Lower arm  (forearm):  mild  Fluid Accumulation/Edema: Severe  Malnutrition Diagnosis: Severe malnutrition in the context of chronic illness/disease    NUTRITION DIAGNOSIS  Malnutrition related to inadequate PO, malabsorptive anatomy as evidenced by patient report, fat and muscle loss, weight loss, chart review.       INTERVENTIONS  Implementation  Collaboration with other providers  Multivitamin/mineral supplement therapy     Goals  Diet adv v nutrition support within 48 hours of admission to ICU.      Monitoring/Evaluation  Progress toward goals will be monitored and evaluated per protocol.    Lesley Juan, MS, RDN, LD  [facility]  Vocera - \"4C Clinical Dietitian\"  Weekend/Holiday RD - \"Weekend Clinical Dietitian\"    "

## 2024-11-27 NOTE — PROGRESS NOTES
[facility]  Patient is currently receiving services with [facility]. The patient is currently receiving RN and PT services.  Patient's  and home health team have been notified that patient is under inpatient status [facility] Liaison will continue to follow patient during stay. Please provide orders to resume home care at time of discharge if appropriate.

## 2024-11-27 NOTE — CONSULTS
Care Management Initial Consult    General Information  Assessment completed with: Valente, Kristen( daughter)  Type of CM/SW Visit: Initial Assessment    Primary Care Provider verified and updated as needed: Yes   Readmission within the last 30 days: other (see comments)   Return Category: Progression of disease  Reason for Consult: discharge planning  Advance Care Planning: Advance Care Planning Reviewed: no concerns identified, present on chart          Communication Assessment  Patient's communication style: spoken language (English or Bilingual)    Hearing Difficulty or Deaf: no   Wear Glasses or Blind: no    Cognitive  Cognitive/Neuro/Behavioral: .WDL except, level of consciousness, speech  Level of Consciousness: alert  Arousal Level: opens eyes spontaneously, arouses to voice  Orientation: oriented x 4        Speech: garbled    Living Environment:   People in home: alone, other (see comments), child(vernell), adult     Current living Arrangements: apartment      Able to return to prior arrangements: yes       Family/Social Support:  Care provided by: child(vernell), homecare agency, other (see comments)  Provides care for: no one  Marital Status: Single  Support system: Children          Description of Support System: Supportive, Involved    Support Assessment: Adequate family and caregiver support, Adequate social supports    Current Resources:   Patient receiving home care services: Yes  Skilled Home Care Services: Skilled Nursing, Physical Therapy, Occupational Therapy     Community Resources: DME, Home Care  Equipment currently used at home: grab bar, toilet, grab bar, tub/shower, shower chair, walker, rolling, walker, standard, wheelchair, power  Supplies currently used at home: Incontinence Supplies, Other    Employment/Financial:  Employment Status: retired        Financial Concerns: none   Referral to Financial Worker: No       Does the patient's insurance plan have a 3 day qualifying hospital  stay waiver?  No    Lifestyle & Psychosocial Needs:  Social Drivers of Health     Food Insecurity: Low Risk  (11/27/2024)    Food Insecurity     Within the past 12 months, did you worry that your food would run out before you got money to buy more?: No     Within the past 12 months, did the food you bought just not last and you didn t have money to get more?: No   Depression: Not at risk (9/4/2024)    PHQ-2     PHQ-2 Score: 0   Housing Stability: Low Risk  (11/27/2024)    Housing Stability     Do you have housing? : Yes     Are you worried about losing your housing?: No   Tobacco Use: Low Risk  (11/4/2024)    Patient History     Smoking Tobacco Use: Never     Smokeless Tobacco Use: Never     Passive Exposure: Not on file   Financial Resource Strain: Low Risk  (11/27/2024)    Financial Resource Strain     Within the past 12 months, have you or your family members you live with been unable to get utilities (heat, electricity) when it was really needed?: No   Alcohol Use: Not on file   Transportation Needs: Low Risk  (11/27/2024)    Transportation Needs     Within the past 12 months, has lack of transportation kept you from medical appointments, getting your medicines, non-medical meetings or appointments, work, or from getting things that you need?: No   Recent Concern: Transportation Needs - High Risk (9/21/2024)    Transportation Needs     Within the past 12 months, has lack of transportation kept you from medical appointments, getting your medicines, non-medical meetings or appointments, work, or from getting things that you need?: Yes   Physical Activity: Not on file   Interpersonal Safety: Low Risk  (11/27/2024)    Interpersonal Safety     Do you feel physically and emotionally safe where you currently live?: Yes     Within the past 12 months, have you been hit, slapped, kicked or otherwise physically hurt by someone?: No     Within the past 12 months, have you been humiliated or emotionally abused in other ways  "by your partner or ex-partner?: No   Stress: Not on file   Social Connections: Not on file   Health Literacy: Not on file       Functional Status:  Prior to admission patient needed assistance:   Dependent ADLs:: Ambulation-walker, Bathing, Dressing, Grooming, Positioning, Transfers, Wheelchair-independent     Assesssment of Functional Status: Not at  functional baseline    Mental Health Status:  Mental Health Status: No Current Concerns  Mental Health Management: Psychiatrist    Chemical Dependency Status:  Chemical Dependency Status: No Current Concerns             Values/Beliefs:  Spiritual, Cultural Beliefs, Scientology Practices, Values that affect care: no               Discussed  Partnership in Safe Discharge Planning  document with patient/family: No    Additional Information:   Per / Negro's assessment on 11/27/24\"  Alis Hartman is a 69 year old female with PMH of gastric bypass, 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 \"    RNCC spoke with patient's daughter Joy via phone ( [phone]) and explained care management role, confirmed demographics. Patient has PCP, HCD and insurance.     Prior to admission patient lived in apartment Patient has PCA and  family members that are helping her with her ADLS, and AIDLs, meals, medications, transportation.   Patient was active with [facility] for RN, PT, and OT services.     Patient was using a front wheel walker, rolling walker and mechanical wheel chair at home. Patient has grab bars, shower chair at home. She is using incontinence " supplies. Suprapubic catheter supplies were received from [employer].     Per Jeannie's report patient is retired and collects Social Security. Family denies any financial concerns or safety concerns. For mental health needs patient follows with her psychiatrist on regular basis.   Family will provide discharge transportation.       Next steps:   Follow with [facility] ( ph. [phone]; fax [phone]) for resumption of care.       TEZ Quintero, MA, CCM, RN  4C/4A/4E ICU Care Coordinator  Phone:[phone]  Available via [employer]     For Weekend & Holiday on call RN Care Coordinator:  [facility] ([phone]) Saturday & Sunday; ([phone])  Recognized Holidays   Weekend 4C/4A/4E ICUs /6A Care Coordinator  Available via [employer]

## 2024-11-27 NOTE — CONSULTS
"Urology Consult History and Physical    Name: Alis Hartman    MRN: [id number]   YOB: 1955       We were asked to see Alis Hartman at the request of Dr. Lira for evaluation and treatment of the following chief complaint.          Chief Complaint:   Persistent moderate left hydroureteronephrosis which extends down to level of the urinary bladder , in the setting of urosepsis and SPT    History is obtained from chart review and patient report          History of Present Illness:    Alis Hartman is a 69 year old female with PMH of gastric bypass, 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      5/3/2024-CT showed new moderate bilateral hydro.  No records of hydro prior to this.    7/21/24 -SNEHA, BSO, cystotomy    Per Dr. Monroy's note-  \"Intraoperatively some bladder was resected.  We performed a complex bladder closure with omental flap.  There was no leak.  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  Thus, I left an SPT and a kent.\"    7/21/2024-CT showed mild bilateral hydro    9/4/24-urethral Kent was removed.  Plan for long-term SPT given better QOL.    9/21/2024-R US showed mild right hydro, no left hydronephrosis    11/7/2024-CT showed mild bilateral hydro, left greater than right    11/27/24 - CT showed resolved right hydronephrosis, persistent mod left hydronephrosis extends down to level of the bladder.      Today  She denies fevers/chills.  Denies flank pain.  She has chronic abdominal pain.  Reports " that her SPT was last changed around 11/25/24.  No concerns with SPT.      UA shows >182 WBC, 107 RBC, 300 protein, large leuks, and negative nitrites. UC pending.  Cr 1.79 - baseline 0.9  WBC 11  Afebrile.    150cc UOP yesterday since admission           Past Medical History:     Past Medical History:   Diagnosis Date    Atrial fibrillation (H)     Depression     Hypertension     Malignant neoplasm of endocervix (H)     Tx with radiation    Near syncope 11/7/2024    Orthopnea 9/21/2024    Other chronic pain     Low back    Schizophrenia (H)     Urinary incontinence             Past Surgical History:     Past Surgical History:   Procedure Laterality Date    ABDOMINOPLASTY      BIOPSY CERVICAL, LOCAL EXCISION, SINGLE/MULTIPLE  10/26/2011    Procedure:BIOPSY CERVICAL, LOCAL EXCISION, SINGLE/MULTIPLE; EUA, cervical biopsies; Surgeon:BETTY TINEO; Location:[facility]    BIOPSY VAGINAL N/A 6/8/2021    Procedure: BIOPSY, VAGINA;  Surgeon: Dany Perez MD;  Location: [facility]    CYSTOSCOPY N/A 5/17/2024    Procedure: Cystoscopy;  Surgeon: Dany Perez MD;  [facility]: [facility]    CYSTOSTOMY, INSERT TUBE SUPRAPUBIC, COMBINED  7/12/2024    Procedure: Insertion of supra-pubic catheter;  Surgeon: Dany Perez MD;  [facility]: [facility]    DILATION AND CURETTAGE, WITH ULTRASOUND GUIDANCE N/A 5/17/2024    Procedure: Dilation and curettage of the uterus with drainage of uterine fluid under ultrasound guidance, lysis of vaginal adhesions;  Surgeon: Dany Perez MD;  Location: [facility]    EXAM UNDER ANESTHESIA PELVIC N/A 3/12/2020    Procedure: Exam under anesthsia, vaginal biopsies, possible CO2 laser of the vagina;  Surgeon: Lilliam Roy MD;  [facility]    GI SURGERY  2004    gastric bypass    HYSTERECTOMY TOTAL ABDOMINAL, BILATERAL SALPINGO-OOPHORECTOMY, COMBINED Bilateral 7/12/2024    Procedure: Diagnostic laparoscopy converted to exploratory laparotomy, total abdominal hysterectomy, bilateral  salpingo-oophorectomy, lysis of adhesions >60 min;  Surgeon: Dany Perez MD;  Location: [facility]    INSERT PORT VASCULAR ACCESS N/A 8/26/2024    Procedure: Insert port vascular access;  Surgeon: Avery Gregory MD;  Location: [facility]    IR CHEST PORT PLACEMENT > 5 YRS OF AGE  8/26/2024    LASER CO2 VAGINA N/A 3/2/2015    Procedure: LASER CO2 VAGINA;  Surgeon: Mariela Abdalla MD;  Location: [facility]    LASER CO2 VAGINA N/A 5/24/2019    Procedure: Exam under anesthesia, vaginal biopsies, CO2 laser of the vagina;  Surgeon: Lilliam Roy MD;  Location: [facility]    LASER CO2 VAGINA N/A 6/8/2021    Procedure: Exam under anesthesia, laser ablation of the upper vagina;  Surgeon: Dany Perez MD;  Location: [facility]    REPAIR BLADDER N/A 7/12/2024    Procedure: Repair bladder;  Surgeon: Dany Perez MD;  Location: [facility]            Social History:     Social History     Tobacco Use    Smoking status: Never    Smokeless tobacco: Never   Substance Use Topics    Alcohol use: Not Currently            Family History:     Family History   Problem Relation Age of Onset    Heart Disease Father     Heart Failure Father     No Known Problems Brother     No Known Problems Brother     No Known Problems Brother     Pancreatitis Brother     Hypertension Sister     Peripheral Vascular Disease Sister     No Known Problems Sister     No Known Problems Son     No Known Problems Daughter             Allergies:     Allergies   Allergen Reactions    Ibuprofen Nausea and Vomiting    Shrimp     Sulfa Antibiotics Rash     Patient started on bactrim 7/24/24 tolerating medication without rash on 7/25             Medications:     Current Facility-Administered Medications   Medication Dose Route Frequency Provider Last Rate Last Admin    acetaminophen (TYLENOL) tablet 650 mg  650 mg Oral Q4H PRN Ashwin Mathis MD        albuterol (PROVENTIL HFA/VENTOLIN HFA) inhaler  1-2 puff Inhalation Q4H PRN Ashwin Mathis MD         [Held by provider] apixaban ANTICOAGULANT (ELIQUIS) tablet 2.5 mg  2.5 mg Oral BID Cheyanne Lira MD        [Held by provider] atenolol (TENORMIN) half-tab 12.5 mg  12.5 mg Oral Daily Ashwin Mathis MD        calcium gluconate 2 g in  mL intermittent infusion  2 g Intravenous Once Gloria Wen MD        glucose gel 15-30 g  15-30 g Oral Q15 Min PRN Cheyanne Lira MD        Or    dextrose 50 % injection 25-50 mL  25-50 mL Intravenous Q15 Min PRN Cheyanne Lira MD        Or    glucagon injection 1 mg  1 mg Subcutaneous Q15 Min PRN Cheyanne Lira MD        diclofenac (VOLTAREN) 1 % topical gel 4 g  4 g Topical 4x Daily PRN Cheyanne Lira MD        ertapenem (INVanz) 500 mg in sodium chloride 0.9 % 50 mL intermittent infusion  500 mg Intravenous Q24H Santos Zamorano  mL/hr at 11/27/24 0601 500 mg at 11/27/24 0601    HYDROmorphone (PF) (DILAUDID) injection 0.5-1 mg  0.5-1 mg Intravenous Q2H PRN Cheyanne Lira MD        lactated ringers BOLUS 500 mL  500 mL Intravenous Once Gloria Wen MD        naloxone (NARCAN) injection 0.2 mg  0.2 mg Intravenous Q2 Min PRN Garcia Cooper MD        Or    naloxone (NARCAN) injection 0.4 mg  0.4 mg Intravenous Q2 Min PRN Garcia Cooper MD        Or    naloxone (NARCAN) injection 0.2 mg  0.2 mg Intramuscular Q2 Min PRN Garcia Cooper MD        Or    naloxone (NARCAN) injection 0.4 mg  0.4 mg Intramuscular Q2 Min PRN Garcia Cooper MD        norepinephrine (LEVOPHED) 16 mg in  mL infusion MAX CONC CENTRAL LINE  0.01-0.6 mcg/kg/min Intravenous Continuous Cheyanne Lira MD 8.3 mL/hr at 11/27/24 0700 0.14 mcg/kg/min at 11/27/24 0700    ondansetron (ZOFRAN ODT) ODT tab 4 mg  4 mg Oral Q6H PRN Cheyanne Lira MD        Or    ondansetron (ZOFRAN) injection 4 mg  4 mg Intravenous Q6H PRN Cheyanne Lira MD        oxyCODONE (ROXICODONE) tablet 5 mg  5 mg Oral Q6H PRN Ashwin Mathis MD        polyethylene glycol (MIRALAX) Packet 17 g  17 g Oral BID PRN Ashwin Mathis MD        [Held by  provider] potassium chloride 20 mEq in 50 mL intermittent infusion  20 mEq Intravenous Q1H Gloria Wen MD        [Held by provider] potassium chloride john ER (KLOR-CON M10) CR tablet 40 mEq  40 mEq Oral Once Gloria Wen MD        prochlorperazine (COMPAZINE) injection 5 mg  5 mg Intravenous Q6H PRN Cheyanne Lira MD        Or    prochlorperazine (COMPAZINE) tablet 5 mg  5 mg Oral Q6H PRN Cheyanne Lira MD        senna-docusate (SENOKOT-S/PERICOLACE) 8.6-50 MG per tablet 1 tablet  1 tablet Oral BID PRN Ashwin Mathis MD        Or    senna-docusate (SENOKOT-S/PERICOLACE) 8.6-50 MG per tablet 2 tablet  2 tablet Oral BID PRN Ashwin Mathis MD        vancomycin (VANCOCIN) 1,500 mg in 0.9% NaCl 250 mL intermittent infusion  1,500 mg Intravenous Once Cheyanne Lira .7 mL/hr at 11/27/24 0700 1,500 mg at 11/27/24 0700    vancomycin place agarwal - receiving intermittent dosing  1 each Does not apply See Admin Instructions Cheyanne Lira MD        zolpidem (AMBIEN) half-tab 2.5 mg  2.5 mg Oral At Bedtime PRN Ashwin Mathis MD         Facility-Administered Medications Ordered in Other Encounters   Medication Dose Route Frequency Provider Last Rate Last Admin    heparin lock flush 100 unit/mL injection 500 Units  500 Units Intracatheter Once Dany Perez MD        sodium chloride (PF) 0.9% PF flush 10 mL  10 mL Intracatheter Once Dany Perez MD                 Review of Systems:    ROS: See HPI for pertinent details.  Remainder of 10-point ROS negative.  REVIEW OF SYSTEMS:  Skin: negative  Eyes: negative  Ears/Nose/Throat: negative  Respiratory: No shortness of breath, dyspnea on exertion, cough, or hemoptysis  Cardiovascular: negative  Gastrointestinal: negative  Genitourinary: as above  Musculoskeletal: negative  Neurologic: negative  Psychiatric: negative  Hematologic/Lymphatic/Immunologic: negative  Endocrine: negative            Physical Exam:   VS:  T: 98    HR: 57    BP: 74/62    RR: 14    GEN:  AOx3.  NAD.  Pleasant.  HEENT:  Sclerae anicteric.  Conjunctivae pink.  Moist mucous membranes  NECK:  Supple.  No lymphadenopathy.  CV:  RRR  LUNGS: Non-labored breathing.  BACK:  No costoverterbral tenderness BL.  ABD:  Soft.  NT.  ND.  No rebound or guarding.    EXT:  Warm, well perfused.    SKIN:  Warm.  Dry.  No rashes.  NEURO:  CN grossly intact.     URINE: Clear          Data:   All laboratory data reviewed:    Recent Labs   Lab 11/27/24  0550 11/26/24  2250   WBC 11.0 9.8   HGB 9.6* 9.6*    142*       Recent Labs   Lab 11/27/24  0451 11/27/24  0317 11/27/24  0127 11/26/24  2348 11/26/24  2259 11/26/24  2250   NA  --   --   --   --   --  135   POTASSIUM  --   --   --   --   --  2.9*   CHLORIDE  --   --   --   --   --  100   CO2  --   --   --   --   --  25   BUN  --   --   --   --   --  20.8   CR  --   --   --   --   --  1.79*   * 113* 165* 214*   < > 128*   SAMUEL  --   --   --   --   --  7.2*   MAG  --   --   --   --   --  1.6*    < > = values in this interval not displayed.       Recent Labs   Lab 11/26/24  2335   COLOR Orange*   APPEARANCE Cloudy*   URINEGLC Negative   URINEBILI Negative   URINEKETONE Negative   SG 1.015   URINEPH 6.0   PROTEIN 300*   NITRITE Negative   LEUKEST Large*   RBCU 107*   WBCU >182*     Results for orders placed or performed during the hospital encounter of 11/07/24   Urine Culture    Collection Time: 11/07/24 12:09 PM    Specimen: Urine, Clean Catch   Result Value Ref Range    Culture >100,000 CFU/mL Mixture of Urogenital Hina    Results for orders placed or performed during the hospital encounter of 10/10/24   Urine Culture    Collection Time: 10/10/24 10:49 AM    Specimen: Urine, Catheter   Result Value Ref Range    Culture 50,000-100,000 CFU/mL Enterococcus faecium (A)     Culture 50,000-100,000 CFU/mL Enterococcus faecium (A)     Culture <1,000 CFU/mL Urogenital hina     Culture <10,000 CFU/mL Candida albicans (A)        Susceptibility    Enterococcus  "faecium - KAYLAN*     Ampicillin >=32 Resistant ug/mL     Linezolid 2 Susceptible ug/mL     Vancomycin <=0.5 Susceptible ug/mL     Nitrofurantoin 32 Susceptible ug/mL    Enterococcus faecium - KAYLAN*     Ampicillin >=32 Resistant ug/mL     Linezolid 2 Susceptible ug/mL     Vancomycin <=0.5 Susceptible ug/mL     Nitrofurantoin <=16 Susceptible ug/mL     * Antibiotics listed as \"No Interpretation\" have no regulatory guidelines for susceptibility/resistance available.     Antibiotics listed as \"No Interpretation\" have no regulatory guidelines for susceptibility/resistance available.       All pertinent imaging reviewed:  CTAP  FINDINGS:   LUNGS AND PLEURA: Left lower lobe calcified granuloma. No new suspicious pulmonary nodule. Dependent atelectasis. No focal pulmonary consolidation otherwise, or significant pleural effusion.     MEDIASTINUM/AXILLAE: Calcified mediastinal and hilar lymph nodes compatible with prior granulomatous disease. Atherosclerotic calcifications of the aortic arch. No significant pericardial effusion.     CORONARY ARTERY CALCIFICATION: Mild.     HEPATOBILIARY: Unremarkable noncontrast CT appearance of the liver. Calcified hepatic granuloma. Cholelithiasis. Mild gallbladder distention.     PANCREAS: Somewhat atrophic, otherwise unremarkable.     SPLEEN: Calcified splenic granulomas.     ADRENAL GLANDS: Normal.     KIDNEYS/BLADDER: Previously noted right hydronephrosis has resolved. Persistent moderate left hydroureteronephrosis which extends down to level of the urinary bladder. Urinary bladder is decompressed with a suprapubic catheter in place.     BOWEL: Moderate colonic stool. No bowel obstruction. Gastric bypass. Trace pelvic ascites.     LYMPH NODES: Normal.     VASCULATURE: Dense atherosclerotic calcifications of the aortoiliac vessels without evidence of aneurysmal dilatation.     PELVIC ORGANS: Hysterectomy and oophorectomy.     MUSCULOSKELETAL: Anasarca. Right groin lipoma. Multilevel " degenerative changes of the cervicothoracic and lumbosacral spine. No acute osseous abnormality or suspicious bony lesion.                                                                      IMPRESSION:  1.  Anasarca.  2.  Previously noted right hydronephrosis has resolved. Persistent moderate left hydroureteronephrosis which extends down to level of the urinary bladder. Urinary bladder is decompressed with a suprapubic catheter in place.  3.  Moderate colonic stool.  4.  Trace pelvic ascites.  5.  Cholelithiasis. Mild gallbladder distention. If cholecystitis is clinically suspected, a right upper quadrant ultrasound could be considered.  6.  No other acute process within the chest, abdomen, or pelvis.         Impression and Plan:   Impression / Plan:    Alis Hartman is a 69 year old female with PMH of gastric bypass, 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      Moderate bilateral hydronephrosis was first noticed in May 2024. She had SNEHA, BSO, cystotomy in July 2024.  She decided to keep SPT after surgery due to better QOL -she had severe incontinence prior to surgery due to radiation cystitis.  She had multiple scans since the surgery showing intermittent mild to moderate bilateral hydro.  Recent CT from yesterday showed left moderate hydroureteronephrosis distended to the bladder level, bladder was decompressed by SPT.  Creatinine today 1.63, elevated from baseline of 0.9.    Plan-  - No SPT exchanges needed given that it was last exchanged within a week  - Current MADHU could be related to  urosepsis, obstruction and prerenal.  - I discussed with IR for PNT placement.  They would like to schedule PNT placement after her hypotension improves.  They do not have availability today or tomorrow, so will recheck on 11/29 for PNT placement.  - Verbally communicated with primary team.        Urology will follow along.     Discussed with Dr. Hu    Thank you for the opportunity to participate in the care of Alis Hartman.     Opal Hawkins  Department of Urology      Please call Job Code:   [phone] to reach the Urology resident or PA on call - Weekdays  [phone] to reach the Urology resident or PA on call - Weeknights and weekends

## 2024-11-27 NOTE — ED TRIAGE NOTES
Pt presents to ED via EMS from home. Dtr called Ems for increased confusion and abd pain. Chronic kent w cloudy urine noted by family. Reports abd pain consistent w previous UTI s/s.  Cancer pt (neither pt or family able to report what type.. Hx heart and kidney problems. VSS w EMS. BG initially 59 w EMS. Received 15 g oral glucose en route. Recheck was 42. Then got 25g dextrose. Has port.

## 2024-11-27 NOTE — PHARMACY-VANCOMYCIN DOSING SERVICE
Pharmacy Vancomycin Initial Note  Date of Service 2024  Patient's  1955  69 year old, female    Indication: Sepsis    Current estimated CrCl = Estimated Creatinine Clearance: 25.8 mL/min (A) (based on SCr of 1.79 mg/dL (H)).    Creatinine for last 3 days  2024: 10:50 PM Creatinine 1.79 mg/dL    Recent Vancomycin Level(s) for last 3 days  No results found for requested labs within last 3 days.      Vancomycin IV Administrations (past 72 hours)        No vancomycin orders with administrations in past 72 hours.                    Nephrotoxins and other renal medications (From now, onward)      Start     Dose/Rate Route Frequency Ordered Stop    24 0330  norepinephrine (LEVOPHED) 16 mg in  mL infusion MAX CONC CENTRAL LINE         0.01-0.6 mcg/kg/min × 63.2 kg  0.6-35.6 mL/hr  Intravenous CONTINUOUS 24 0316              Contrast Orders - past 72 hours (72h ago, onward)      None                  Plan:  Start vancomycin  1500 mg IV once, then follow levels.   Vancomycin monitoring method: Trough (Method 2 = manual dose calculation)  Vancomycin therapeutic monitoring goal: 15-20 mg/L  Pharmacy will check vancomycin levels as appropriate in 1-3 Days.    Serum creatinine levels will be ordered daily for the first week of therapy and at least twice weekly for subsequent weeks.      Reji Johnson [facility]

## 2024-11-27 NOTE — CONSULTS
[facility] CONSULTATION     Patient:  Alis Hartman   Date of birth 1955, Medical record number [id number]  Date of Visit:  11/27/2024  Date of Admission: 11/26/2024  Consult Requester:Antonio Carpenter MD          Assessment and Recommendations:   ASSESSMENT:  SIRS shock with concern for Sepsis 11/27  Hx of cystotomy s/p suprapubic catheter (07/12/24) previously treated for UTI colonization of ESBL  MADHU w/ persistent left hydroureteronephrosis (First noted on imaging 5/3/2024)  Hypoglycemia  Acute encephalopathy  Uterine adenocarcinoma  S/p SNEHA with BSO (7/12/2024)  S/p Cycle 3 carbo/taxel (last 10/15/24)  History of gastric bypass    DISCUSSION:   Aranza is a 69 year old female with a PMH of endometrial adenocarcinoma of uterus s/p SNEHA with BSO (7/12/2024) s/p Cycle 3 with carbo/taxel (10/15/2024), cystotomy s/p suprapubic catheter w/ hx of recurrent ESBL (Klebsiella) infections, A-fib on eliquis, HTN, and anemia of chronic disease who presented to the ED on 11/26 with abdominal pain and confusion. Admitted to ICU for septic shock for which she is requiring pressors.    Patient had a blood glucose of 34 upon arrival to the ED. Tmax of 98, White count up to 11, CRP of 13.3, procalcitonin 0.1, Elevated creatinine of 1.79, and hypotension. She been treated for recurrent UTIs klebsiella ESBL x 2 occurances and E. Faecium and candida albicans infections post suprapubic placement, recently hospitalized 11/7 for UTI workup. Blood and port culture currently pending. UA shows negative nitrate, large blood and leukocyte esterase, , , Few bacteria, WBC clumps, and 9 squamous cells present Urine culture shows ,000 mixed urogenital elisa.  CT scan showed hydronephrosis, mild gallbladder distention, and no abdominal abscess. Ultrasound confirmed no cholecystitis. Suprapubic currently draining no signs of infection around sight. Port flushed, sample drawn, and no signs of  infection around sight.    Patient has shock requiring pressor support. Lack of fever, normal WBC, negative procalcitonin, and relatively low CRP are not fitting for severe infection causing septic shock. We would typically see elevated procalcitonin with MADHU- so low procalcitonin in this setting is reassuring. When she was admitted with bacteremia, CRP and procalcitonin were elevated consistent with an inflammatory response to infection. Possibility of port infection with reports of poor blood return previously- blood cultures pending from peripheral and port. Has chronic left hydronephrosis noticed 05/2024- possibility of infection please send urine when PNT is placed. Continue Ertapenem for now while investigating for etiology of shock as infection seems less likely based off of current workup.    RECOMMENDATION:  Ok to continue Ertapenem for now.  Urine when Percutaneous drain is placed.  Agree with echo to assess for potential heart failures etiology of hypotension and lower extremity edema.  No indication for UTI prophylaxis previous UA have been consistent with results from existing urinary catheter. Suspect she may not have had as many UTIs as she has been treated for.    Thank you for this consult. ID will continue to follow.     Patient was discussed with Dr. Shoemaker.     Mike TITUS        Physician Attestation   I, Padmini Shoemaker MD, was present with the medical/THAO student who participated in the service and in the documentation of the note.  I have verified the history and personally performed the physical exam and medical decision making.  I agree with the assessment and plan of care as documented in the note.      Key findings: Patient with uterine cancer who presents with hypotension and abdominal pain. Her CRP is just above normal. Procalcitonin is wnl. She does not have a WBC. This does not appear to be an infection. Given her suprapubic catheter, I do not think this is a  UTI/urosepsis. I would keep a broad differential and evaluate for other causes of hypotension.     It is ok to keep antibiotics for the moment but narrow tomorrow if cultures remain negative.     We will follow along. Please call with questions.     Please see A&P for additional details of medical decision making.    I have personally reviewed the following data over the past 24 hrs:    11.0  \   9.6 (L)   / 151     132 (L) 99 20.5 /  153 (H)   4.3 23 1.67 (H) \     ALT: 19 AST: 26 AP: 141 TBILI: 0.4   ALB: 1.8 (L) TOT PROTEIN: 4.5 (L) LIPASE: N/A     Procal: 0.10 CRP: 13.30 (H) Lactic Acid: 1.8       INR:  1.09 PTT:  N/A   D-dimer:  N/A Fibrinogen:  N/A         Padmini Shoemaker MD  Date of Service (when I saw the patient): 11/27/24   ________________________________________________________________    Consult Question: Recurrent urosepsis, consult for query of addition of antifungal? And if she should be on suppressive therapy.  Admission Diagnosis: Hypoglycemia [E16.2]  Sepsis, due to unspecified organism, unspecified whether acute organ dysfunction present (H) [A41.9]         History of Present Illness:   Aranza Hartman is a 69 year old female with a PMH of endometrial adenocarcinoma of uterus s/p SNEHA with BSO (7/12/2024) s/p Cycle 3 with carbo/taxel (10/15/2024), cystotomy s/p suprapubic catheter w/ hx of recurrent ESBL (Klebsiella) infections, A-fib on eliquis, HTN, and anemia of chronic disease who presented to the ED on 11/26 with abdominal pain and confusion. Admitted to ICU for septic shock for which she is requiring pressors.    Unable to gather additional history due to patients altered mental status. No patient family present in room at time of patient visit to provide additional information. Following information gained from chart review. Lower abdominal pain started on day of ED arrival. She did note some nausea the day before but denied emesis or diarrhea. She denies fever but does have chills.  She has some back pain. Denies chest pain, shortness of breath, blood in stool, or cough.     Antimicrobials  Current:  Ertapenem 11/27-present    Prior:  Zosyn 11/27  Vancomycin 11/27  Bactrim 11/09-11/17  Zosyn 11/07-11/09           Past Medical History:     Past Medical History:   Diagnosis Date    Atrial fibrillation (H)     Depression     Hypertension     Malignant neoplasm of endocervix (H)     Tx with radiation    Near syncope 11/7/2024    Orthopnea 9/21/2024    Other chronic pain     Low back    Schizophrenia (H)     Urinary incontinence             Past Surgical History:     Past Surgical History:   Procedure Laterality Date    ABDOMINOPLASTY      BIOPSY CERVICAL, LOCAL EXCISION, SINGLE/MULTIPLE  10/26/2011    Procedure:BIOPSY CERVICAL, LOCAL EXCISION, SINGLE/MULTIPLE; EUA, cervical biopsies; Surgeon:BETTY TINEO; Location:[facility] OR    BIOPSY VAGINAL N/A 6/8/2021    Procedure: BIOPSY, VAGINA;  Surgeon: Dany Perez MD;  Location: [facility]    CYSTOSCOPY N/A 5/17/2024    Procedure: Cystoscopy;  Surgeon: Dany Perez MD;  Location: [facility]    CYSTOSTOMY, INSERT TUBE SUPRAPUBIC, COMBINED  7/12/2024    Procedure: Insertion of supra-pubic catheter;  Surgeon: Dany Perez MD;  Location: [facility]    DILATION AND CURETTAGE, WITH ULTRASOUND GUIDANCE N/A 5/17/2024    Procedure: Dilation and curettage of the uterus with drainage of uterine fluid under ultrasound guidance, lysis of vaginal adhesions;  Surgeon: Dany Perez MD;  Location: [facility]    EXAM UNDER ANESTHESIA PELVIC N/A 3/12/2020    Procedure: Exam under anesthsia, vaginal biopsies, possible CO2 laser of the vagina;  Surgeon: Lilliam Roy MD;  [facility]: [facility]    GI SURGERY  2004    gastric bypass    HYSTERECTOMY TOTAL ABDOMINAL, BILATERAL SALPINGO-OOPHORECTOMY, COMBINED Bilateral 7/12/2024    Procedure: Diagnostic laparoscopy converted to exploratory laparotomy, total abdominal hysterectomy, bilateral salpingo-oophorectomy, lysis of adhesions  >60 min;  Surgeon: Dany Perez MD;  Location: [facility]    INSERT PORT VASCULAR ACCESS N/A 8/26/2024    Procedure: Insert port vascular access;  Surgeon: Avery Gregory MD;  Location: [facility]    IR CHEST PORT PLACEMENT > 5 YRS OF AGE  8/26/2024    LASER CO2 VAGINA N/A 3/2/2015    Procedure: LASER CO2 VAGINA;  Surgeon: Mariela Abdalla MD;  Location: [facility]    LASER CO2 VAGINA N/A 5/24/2019    Procedure: Exam under anesthesia, vaginal biopsies, CO2 laser of the vagina;  Surgeon: Lilliam Ryo MD;  Location: [facility]    LASER CO2 VAGINA N/A 6/8/2021    Procedure: Exam under anesthesia, laser ablation of the upper vagina;  Surgeon: Dany Perez MD;  Location: [facility]    REPAIR BLADDER N/A 7/12/2024    Procedure: Repair bladder;  Surgeon: Dany Perez MD;  Location: [facility]            Family History:   Reviewed and non-contributory.   Family History   Problem Relation Age of Onset    Heart Disease Father     Heart Failure Father     No Known Problems Brother     No Known Problems Brother     No Known Problems Brother     Pancreatitis Brother     Hypertension Sister     Peripheral Vascular Disease Sister     No Known Problems Sister     No Known Problems Son     No Known Problems Daughter             Social History:     Social History     Tobacco Use    Smoking status: Never    Smokeless tobacco: Never   Substance Use Topics    Alcohol use: Not Currently     History   Sexual Activity    Sexual activity: Not on file            Current Medications:     Current Facility-Administered Medications   Medication Dose Route Frequency Provider Last Rate Last Admin    [Held by provider] apixaban ANTICOAGULANT (ELIQUIS) tablet 2.5 mg  2.5 mg Oral BID Cheyanne Lira MD        [Held by provider] atenolol (TENORMIN) half-tab 12.5 mg  12.5 mg Oral Daily Ashwin Mathis MD        benztropine (COGENTIN) tablet 1 mg  1 mg Oral Daily Gloria Wen MD   1 mg at 11/27/24 1115    [START ON 11/28/2024] calcium  carbonate-vitamin D (CALTRATE) 600-10 MG-MCG per tablet 1 tablet  1 tablet Oral BID w/meals Gloria Wen MD        [START ON 11/28/2024] childrens multivitamin w/iron (FLINTSTONES COMPLETE) chewable tablet 1 tablet  1 tablet Oral Daily Gloria Wen MD        [START ON 11/28/2024] cyanocobalamin (VITAMIN B-12) tablet 500 mcg  500 mcg Oral Daily Gloria Wen MD        ertapenem (INVanz) 500 mg in sodium chloride 0.9 % 50 mL intermittent infusion  500 mg Intravenous Q24H Santos Zamorano  mL/hr at 11/27/24 0601 500 mg at 11/27/24 0601    gabapentin (NEURONTIN) capsule 200 mg  200 mg Oral TID Antonio Carpenter MD        hydrocortisone sodium succinate PF (solu-CORTEF) injection 50 mg  50 mg Intravenous Q6H Gloria Wen MD   50 mg at 11/27/24 1113    Followed by    [START ON 11/28/2024] hydrocortisone sodium succinate PF (solu-CORTEF) injection 50 mg  50 mg Intravenous Q8H Gloria Wen MD        Followed by    [START ON 11/29/2024] hydrocortisone sodium succinate PF (solu-CORTEF) injection 50 mg  50 mg Intravenous Q12H Gloria Wen MD        Followed by    [START ON 11/30/2024] hydrocortisone sodium succinate PF (solu-CORTEF) injection 50 mg  50 mg Intravenous Q12H Gloria Wen MD        Followed by    [START ON 12/2/2024] hydrocortisone sodium succinate PF (solu-CORTEF) injection 50 mg  50 mg Intravenous Q12H Gloria Wen MD        QUEtiapine (SEROquel) tablet 400 mg  400 mg Oral BID Gloria Wen MD   400 mg at 11/27/24 1115    sertraline (ZOLOFT) tablet 150 mg  150 mg Oral Daily Gloria Wen MD   150 mg at 11/27/24 1115            Allergies:     Allergies   Allergen Reactions    Ibuprofen Nausea and Vomiting    Shrimp     Sulfa Antibiotics Rash     Patient started on bactrim 7/24/24 tolerating medication without rash on 7/25             Physical Exam:   Vitals were reviewed  Patient Vitals for the past 24 hrs:   BP Temp Temp src Pulse Resp SpO2 Height Weight   11/27/24 1230 (!) 84/66 -- -- 55 14 100 % -- --  "  11/27/24 1215 (!) 60/51 -- -- 61 14 100 % -- --   11/27/24 1200 99/69 97.4  F (36.3  C) Oral 57 12 100 % -- --   11/27/24 1145 (!) 76/46 -- -- 58 19 100 % -- --   11/27/24 1130 (!) 84/64 -- -- 57 12 100 % -- --   11/27/24 1115 92/65 -- -- 53 11 100 % -- --   11/27/24 1100 (!) 89/66 -- -- 54 14 -- -- --   11/27/24 1045 99/76 -- -- 52 15 -- -- --   11/27/24 1030 97/67 -- -- 54 14 -- -- --   11/27/24 1015 97/84 -- -- 64 22 -- -- --   11/27/24 1000 93/68 -- -- 60 14 -- -- --   11/27/24 0945 98/66 -- -- 58 14 -- -- --   11/27/24 0930 91/68 -- -- 54 12 -- -- --   11/27/24 0915 99/71 -- -- 58 21 -- -- --   11/27/24 0900 (!) 85/67 -- -- 56 22 -- -- --   11/27/24 0845 93/71 -- -- 57 16 -- -- --   11/27/24 0830 98/67 -- -- 54 14 -- -- --   11/27/24 0815 94/65 -- -- 58 22 -- -- --   11/27/24 0800 (!) 74/62 98  F (36.7  C) Oral 57 -- 98 % -- --   11/27/24 0745 107/64 -- -- 57 -- -- -- --   11/27/24 0730 (!) 80/60 -- -- 54 -- -- -- --   11/27/24 0715 (!) 82/59 -- -- 54 -- -- -- --   11/27/24 0700 (!) 80/54 -- -- 54 14 100 % -- --   11/27/24 0658 (!) 88/55 -- -- 53 -- -- -- --   11/27/24 0645 (!) 77/54 -- -- 54 -- -- -- --   11/27/24 0630 (!) 81/53 -- -- 53 -- -- -- --   11/27/24 0615 96/61 -- -- 53 -- -- -- --   11/27/24 0600 91/63 -- -- 55 12 98 % -- --   11/27/24 0545 103/70 -- -- 58 -- -- -- --   11/27/24 0530 (!) 77/66 -- -- 51 -- -- -- --   11/27/24 0527 (!) 83/60 -- -- 55 -- -- -- --   11/27/24 0525 (!) 72/51 -- -- 53 -- -- -- --   11/27/24 0517 (!) 81/53 -- -- 50 -- -- -- --   11/27/24 0515 121/71 -- -- (!) 47 -- -- -- --   11/27/24 0500 91/70 97.4  F (36.3  C) Oral 54 14 98 % 1.6 m (5' 3\") 72.6 kg (160 lb 0.9 oz)   11/27/24 0445 (!) 83/59 -- -- 55 -- -- -- --   11/27/24 0430 101/64 -- -- 54 -- -- -- --   11/27/24 0424 90/67 -- -- 55 -- -- -- --   11/27/24 0422 (!) 82/61 -- -- 55 -- -- -- --   11/27/24 0420 (!) 82/55 -- -- 56 -- -- -- --   11/27/24 0418 (!) 78/65 -- -- 55 -- -- -- --   11/27/24 0416 (!) 85/54 -- -- 56 " -- -- -- --   11/27/24 0414 (!) 79/56 -- -- 56 -- -- -- --   11/27/24 0412 (!) 76/60 -- -- 56 -- -- -- --   11/27/24 0410 (!) 80/63 -- -- 56 -- -- -- --   11/27/24 0407 (!) 88/59 -- -- 57 -- -- -- --   11/27/24 0405 (!) 87/59 -- -- 57 -- -- -- --   11/27/24 0403 (!) 83/54 -- -- 57 -- -- -- --   11/27/24 0400 (!) 79/61 -- -- 56 -- -- -- --   11/27/24 0350 (!) 84/57 -- -- 57 -- -- -- --   11/27/24 0343 93/62 -- -- 59 12 100 % -- --   11/27/24 0336 93/65 -- -- 61 -- -- -- --   11/27/24 0335 (!) 87/78 -- -- 59 -- -- -- --   11/27/24 0332 103/81 -- -- (!) 46 -- 100 % -- --   11/27/24 0330 (!) 79/59 -- -- 61 -- -- -- --   11/27/24 0315 (!) 66/48 -- -- 56 -- -- -- --   11/27/24 0310 (!) 62/46 (!) 96.2  F (35.7  C) Axillary 57 -- -- -- --   11/27/24 0300 (!) 79/55 -- -- 58 -- -- -- --   11/27/24 0240 (!) 80/61 -- -- 58 -- -- -- --   11/27/24 0230 (!) 71/56 -- -- 58 -- -- -- --   11/27/24 0220 (!) 72/56 -- -- 61 -- -- -- --   11/27/24 0215 (!) 87/55 -- -- 61 -- -- -- --   11/27/24 0200 (!) 79/63 (!) 96.1  F (35.6  C) Rectal 63 16 100 % -- --   11/27/24 0145 (!) 82/58 -- -- 63 -- -- -- --   11/27/24 0130 (!) 76/57 -- -- 62 -- -- -- --   11/27/24 0121 (!) 77/56 -- -- 60 -- -- -- --   11/27/24 0115 (!) 74/62 -- -- 58 -- -- -- --   11/27/24 0114 (!) 82/54 -- -- 58 -- -- -- --   11/27/24 0110 (!) 77/52 -- -- 58 -- -- -- --   11/27/24 0100 (!) 77/58 -- -- 58 -- -- -- --   11/27/24 0050 (!) 78/61 -- -- 61 -- -- -- --   11/27/24 0020 (!) 80/59 -- -- 64 -- -- -- --   11/27/24 0015 (!) 89/62 -- -- 62 -- -- -- --   11/27/24 0000 (!) 80/58 -- -- 66 -- -- -- --   11/26/24 2350 (!) 88/59 -- -- 65 -- 100 % -- --   11/26/24 2345 (!) 81/59 -- -- 65 -- 100 % -- --   11/26/24 2344 -- 96.9  F (36.1  C) Axillary -- 12 100 % -- --   11/26/24 2340 94/63 -- -- 65 -- 100 % -- --   11/26/24 2335 90/57 -- -- 65 -- 100 % -- --   11/26/24 2330 (!) 86/55 -- -- 65 -- 100 % -- --   11/26/24 2325 (!) 88/61 -- -- 66 -- 100 % -- --   11/26/24 2320 91/60 -- --  69 -- -- -- --   11/26/24 2310 96/64 -- -- 75 -- -- -- --   11/26/24 2305 (!) 78/63 -- -- 63 -- -- -- --   11/26/24 2300 (!) 88/56 -- -- 63 -- -- -- --   11/26/24 2255 (!) 84/54 -- -- 62 -- -- -- --   11/26/24 2250 (!) 71/49 -- -- 63 -- -- -- --   11/26/24 2247 (!) 69/56 -- -- 65 -- -- -- --   11/26/24 2245 (!) 72/60 -- -- 64 -- -- -- --   11/26/24 2234 (!) 72/46 -- -- 62 -- -- -- --   11/26/24 2233 (!) 69/51 -- -- 62 -- -- -- --       Physical Examination:  GENERAL:  Sitting in bed showing no signs of acute distress.  HEENT:  Head is normocephalic, atraumatic   EYES:  Eyes have anicteric sclerae without conjunctival injection   ENT:  Unable to assess  NECK:  Supple. No cervical lymphadenopathy  LUNGS:  diminished lung sounds  CARDIOVASCULAR:  Regular rate and rhythm with no murmurs, gallops or rubs.  ABDOMEN:  Suprapubic catheter site shows no appreciable redness, swelling, drainage. Normal bowel sounds, soft, nontender. No appreciable hepatosplenomegaly  SKIN:  +lower extremity edema 2-3+ bilaterally. No acute rashes.  Line(s) are in place without any surrounding erythema or exudate. No stigmata of endocarditis.  NEUROLOGIC:   Patient not answering questions or following demands at time of visit. Face symmetric no resting tremor  MSK: No swelling, warmth, or erythema over shoulders, elbows, wrists, knees, and ankles.         Laboratory Data:     Inflammatory Markers  No lab results found.    Hematology Studies    Recent Labs   Lab Test 11/27/24  0550 11/26/24  2250 11/10/24  0638 11/09/24  0629 11/08/24  0651 11/07/24  1005   WBC 11.0 9.8 4.9 5.0 6.5 8.0   HGB 9.6* 9.6* 7.6* 7.4* 8.5* 8.9*   MCV 95 97 95 95 95 94    142* 173 154 186 184       Metabolic Studies     Recent Labs   Lab Test 11/27/24  0550 11/26/24  2250 11/10/24  0638 11/09/24  0725 11/08/24 2058 11/08/24  0651   * 135 136 136  --  132*   POTASSIUM 4.3 2.9* 3.9 3.7 3.8 3.1*   CHLORIDE 99 100 101 100  --  96*   CO2 23 25 28 28  --  28    BUN 20.5 20.8 9.0 9.5  --  9.7   CR 1.67* 1.79* 0.97* 0.89  --  0.86   GFRESTIMATED 32* 30* 62 69  --  72       Hepatic Studies    Recent Labs   Lab Test 11/27/24  0550 11/26/24  2250 11/07/24  1005 10/15/24  0845 10/10/24  0954 10/01/24  1730 09/24/24  0314   BILITOTAL 0.4 0.4 0.5 0.3 0.2 0.2 0.2   ALKPHOS 141 134 121 109 101 98 80   ALBUMIN 1.8* 1.8* 2.3* 2.5* 2.4* 2.1* 2.2*   AST  --  26 21 17 13 8 9   ALT 19 15 12 8 6 8 6       Microbiology:  Culture   Date Value Ref Range Status   11/27/2024 No growth after 12 hours  Preliminary   11/07/2024 No Growth  Final   11/07/2024 >100,000 CFU/mL Mixture of Urogenital Elisa  Final   10/10/2024 50,000-100,000 CFU/mL Enterococcus faecium (A)  Final   10/10/2024 50,000-100,000 CFU/mL Enterococcus faecium (A)  Final   10/10/2024 <1,000 CFU/mL Urogenital elisa  Final   10/10/2024 <10,000 CFU/mL Candida albicans (A)  Final     Comment:     Susceptibilities not routinely done, refer to antibiogram to view typical susceptibility profiles   09/25/2024 No Growth  Final   09/24/2024 No Growth  Final   09/21/2024 Positive on the 1st day of incubation (A)  Final   09/21/2024 Klebsiella pneumoniae ESBL (AA)  Final     Comment:     2 of 2 bottles   09/21/2024 >100,000 CFU/mL Mixture of Urogenital Elisa  Final   07/29/2024 No Growth  Final   07/29/2024 No Growth  Final   07/21/2024 2+ Bacteroides thetaiotaomicron (A)  Final     Comment:     Susceptibilities not routinely done, refer to antibiogram to view typical susceptibility profiles   07/21/2024 3+ Mixed Aerobic and Anaerobic elisa  Final   07/21/2024 2+ Klebsiella pneumoniae ESBL (A)  Final   07/21/2024 2+ Proteus mirabilis (A)  Final   07/21/2024 2+ Normal elisa  Final   07/21/2024 >100,000 CFU/mL Klebsiella pneumoniae ESBL (A)  Final   07/21/2024 10,000-50,000 CFU/mL Klebsiella pneumoniae (A)  Final   04/11/2024 >100,000 CFU/mL Mixture of Urogenital Elisa  Final   05/23/2023 >100,000 CFU/mL Escherichia coli (A)  Final  "      Urine Studies    Recent Labs   Lab Test 11/26/24  2335 11/07/24  1209 10/10/24  1049 09/21/24  0318 07/29/24  1946   LEUKEST Large* Large* Large* Moderate* Large*   WBCU >182* >182* >182* >100* 78*       Vancomycin Levels  No lab results found.    Invalid input(s): \"VANCO\"    Hepatitis B Testing No lab results found.  Hepatitis C Testing   No results found for: \"HCVAB\", \"HQTG\", \"HCGENO\", \"HCPCR\", \"HQTRNA\", \"HEPRNA\"  Respiratory Virus Testing    No results found for: \"RS\", \"FLUAG\"          Imaging:   CT Abdomen Pelvis w/o Contrast 11/27  IMPRESSION:  1.  Anasarca.  2.  Previously noted right hydronephrosis has resolved. Persistent moderate left hydroureteronephrosis which extends down to level of the urinary bladder. Urinary bladder is decompressed with a suprapubic catheter in place.  3.  Moderate colonic stool.  4.  Trace pelvic ascites.  5.  Cholelithiasis. Mild gallbladder distention. If cholecystitis is clinically suspected, a right upper quadrant ultrasound could be considered.  6.  No other acute process within the chest, abdomen, or pelvis.    US Abdomen 11/27  IMPRESSION: Exam limited by patient body habitus and shadowing bowel  gas.  1.  Cholelithiasis and biliary sludge without evidence of acute  cholecystitis.  2.  The common bile duct, pancreas, and right kidney were not  visualized.  3.  Simple appearing 0.8 cm left hepatic cyst.      "

## 2024-11-27 NOTE — PROGRESS NOTES
"   11/27/24 9477   Appointment Info   Signing Clinician's Name / Credentials (PT) Alisia Myers, PT, DPT   Rehab Comments (PT) co eval   Living Environment   People in Home child(vernell), adult  (granddaugher, grandson, daughter)   Current Living Arrangements apartment   Home Accessibility stairs to enter home   Number of Stairs, Main Entrance 6   Stair Railings, Main Entrance railings safe and in good condition   Transportation Anticipated family or friend will provide   Living Environment Comments Pt reports living in apartment with her adult kids. Home has a tub/shower with tub bench   Self-Care   Usual Activity Tolerance good   Current Activity Tolerance moderate   Equipment Currently Used at Home   (FWW, wc)   Activity/Exercise/Self-Care Comment Pt reports she nearly always has someone with her when she is mobilizing, uses FWW. IND with UB dressing, requires assist with LB dressing. Pt is never home alone. 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, BSO (7/12/2024) s/p Cycle 3 carbo/taxel (10/15/24), cystotomy s/p suprapubic catheter w/ hx of recurrent ESBL (Klebsiella) urosepsis & bacteremia, A-fib on eliquis, HTN, and anemia of chronic disease who initially presented to ED from home 11/26 with abdominal pain and confusion, admitted to gyn/onc service 11/27 for urosepsis, transferred to the ICU two hours later for septic shock requiring pressors\"   Existing Precautions/Restrictions fall   General Observations Activity: ICU protocol   Cognition   Cognitive Status Comments Sleepy, followed commands " appropriately, highly focused on pain in abd and LEs   Integumentary/Edema   Integumentary/Edema Comments B LE edema, pt reports some is baseline but slightly increase   Posture    Posture Comments Unsupported sitting: CGA   Range of Motion (ROM)   Range of Motion ROM is WFL   ROM Comment LEs restricted by edema   Strength (Manual Muscle Testing)   Strength (Manual Muscle Testing) strength is WFL   Strength Comments General weakness, antigravity wtih all limbs   Bed Mobility   Bed Mobility supine-sit   Comment, (Bed Mobility) Slow speed, unable to manage LEs   Transfers   Transfers sit-stand transfer   Sit-Stand Transfer   Sit-Stand [place] (Transfers) minimum assist (75% patient effort);2 person assist   Comment, (Sit-Stand Transfer) Moderate UE support, poor tolerance due to pain   Gait/Stairs (Locomotion)   Comment, (Gait/Stairs) Unable to tolerate   Balance   Balance Comments Unsupported sitting: CGA   Sensory Examination   Sensory Perception Comments Pt reports numbness in B feet including toes   Clinical Impression   Criteria for Skilled Therapeutic Intervention Yes, treatment indicated   PT Diagnosis (PT) impaired mobility   Influenced by the following impairments strength, pain, activity tolerance   Functional limitations due to impairments transfers, gait, bed mobility   Clinical Presentation (PT Evaluation Complexity) evolving   Clinical Presentation Rationale clinical judgement   Clinical Decision Making (Complexity) moderate complexity   Planned Therapy Interventions (PT) balance training;bed mobility training;gait training;home exercise program;neuromuscular re-education;stair training;strengthening;transfer training;progressive activity/exercise   Risk & Benefits of therapy have been explained evaluation/treatment results reviewed;care plan/treatment goals reviewed;risks/benefits reviewed;current/potential barriers reviewed;participants voiced agreement with care plan;participants included;patient    PT Total Evaluation Time   PT Eval, Moderate Complexity Minutes (53962) 10   Physical Therapy Goals   PT Frequency 5x/week   PT Predicted Duration/Target Date for Goal Attainment 12/20/24   PT Goals Bed Mobility;Transfers;Gait;Stairs   PT: Bed Mobility Minimal assist   PT: Transfers Minimal assist;Sit to/from stand   PT: Gait Minimal assist;50 feet   PT: Stairs Minimal assist;6 stairs   Interventions   Interventions Quick Adds Gait Training;Therapeutic Activity;Neuromuscular Re-ed   Therapeutic Activity   Therapeutic Activities: dynamic activities to improve functional performance Minutes (75621) 39   Treatment Detail/Skilled Intervention Pt supine upon arrival, agreeable to therapy session with encouragement, pt initially feeling limited by pain. Edu on role of acute PT and implications of immobility. RN ok'd mobility. BP monitored with position changes, maintained within parameters with MAP >65. To progress functional independence, pt completed transfers. Supine to sit, max A for management of LEs, mod A at trunk. Pt limited by pain, required rest breaks for management. Pt maintained EOB sitting with SBA, good trunk control and tolerance. To complete STS from EOB, vcs for hand placement. In standing, supported self with posterior calves on bed frame. Cues for progressing to lateral stepping, pt unable to clear either LE from ground. Tolerated standing for ~20 seconds. Min A to control descent to sitting. Sit to supine, max A x2 for management of trunk and LEs. Total A for boost in bed. Discussuion held on ongoing PT goals.  Pt supine at end of session, call light in reach, denied further needs.   PT Discharge Planning   PT Plan Progress STS, pre gait, pivot as able   PT Discharge Recommendation (DC Rec) Transitional Care Facility   PT Rationale for DC Rec Pt requiring Ax2 for bed mobility, unable to tolerate pivot at this time. Recommend TCU to progress functional independence to level that caregiver can  provide.   PT Brief overview of current status Lift A x2   Physical Therapy Time and Intention   Timed Code Treatment Minutes 39   Total Session Time (sum of timed and untimed services) 49

## 2024-11-27 NOTE — ED NOTES
Bed: ED19  Expected date:   Expected time:   Means of arrival:   Comments:  [address]  69f  Cancer pt  Increased confusion  Abd pain  Possible uti   Yellow

## 2024-11-27 NOTE — H&P
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     The patient has been having increased abdominal pain and bloody/cloudy urine over the past three days; however her pain increased significantly today, and she began to develop more confusion. The patient reports the abdominal pain is similar to when she had UTIs in the past. The patient reports no fever, blurry vision, chest pain, difficulty breathing. The patient endorses worsening chills, decreased appetite, and dizziness. The patient also reports vaginal itching and burning that she has been experiencing for the past 2 weeks along with white discharge. The patient also states that her decubitis ulcer has been growing more painful over the past few weeks to the point where she has difficulty just sitting with it. She notes no exudate or erythema, but some more increased swelling.    Upon presentation to the home, EMS took a blood glucose level which was in the 40s. A repeat BG level after an attempt to correct it was in the 50s. The patient's history is elicited from her and her daughter who reports that her mother is not herself, and is in much more pain than she usually is.            Cancer Treatment History:     Oncology History   Cervical cancer (H)   3/1996 Initial Diagnosis     Cervical cancer     Moderately differentiated squamous cell carcinoma.  She was treated with primary radiation therapy, unsure if she also had chemotherapy or not.  [place] [facility] [place]/2014 Procedure     Exam under anesthesia with LASER to the vagina for VAIN 3      5/24/2019 Procedure     12/27/18:  Pap:  HSIL, HPV+  5/24/19: PROCEDURES: Vaginal colposcopy, vaginal biopsy, CO2 laser of the vagina  VAGINAL BIOPSY:   - High grade squamous intraepithelial lesion (vaginal intraepithelial neoplasia 3)       3/12/2020 Procedure     10/14/19: Pap HSIL/other HPV+  2/28/2020: biopsy of vagina Vain III; MRI recommended prior to OR; however, patient did not complete this  3/12/2020: EUA, biopsies, LASER: VAIN III      6/8/2021 Procedure     6/8/2021: Exam under anesthesia, vaginal biopsies, laser ablation of the upper vagina with Dr. Perez, biopsies returned showing necrosis, no dysplasia. Hyperbaric oxygen therapy discussed and referral place. Patient was unable to pursue this due to need for transportation to the [facility].      4/6/2023 Imaging     4/6/2023 CT Urogram: IMPRESSION:  1.  Negative CT urogram. No urinary calculi, solid renal mass, or evidence of upper tract urothelial malignancy.   2.  Enlarged uterus, with marked distention of the endometrial canal by intermediate density material, possibly a mix of fluid and blood products. This may be related to cervical stenosis given the history of prior pelvic radiation. Uterine enlargement may contribute to urinary frequency/urgency.   3.  Indeterminate left pelvic/perirectal mass with rim calcifications measuring up to 4.5 cm. Differentials include an old hematoma or an atypical enlarged lymph node. Consider pelvic MRI with contrast and subtraction imaging for further evaluation. The uterus and endometrium can also be further evaluated at that time.         Serous adenocarcinoma of uterus (H)     Initial Diagnosis     Uterine serous carcinoma      5/3/2024 Imaging     CT and ultrasound showing distended endometrial  canal. No evidence of metastatic disease      5/17/2024 Surgery     D&C of the uterus with drainage of the uterine fluid under US guidance, lysis of vaginal adhesions, cystoscopy      5/17/2024 Pathology     Endometrial curettings: Endometrial serous carcinoma with extensive necrosis      7/12/2024 Surgery     Diagnostic laparoscopy converted to exploratory laparotomy, total abdominal hysterectomy, bilateral salpingo-oophorectomy, lysis of adhesions, bladder repair and insertion of suprapubic catheter by Dr. Monroy      7/12/2024 Pathology     Final pathology with uterine serous carcinoma, MMR-intact/[id number]/HER2 negative         7/12/2024 -  Cancer Staged     T2NxM0 uterine serous carcinoma, omentum negative, washings negative, lymph nodes omitted due to radiation history and high grade histology      9/20/2024 - 9/27/2024 Hospital Admission     Admission for sepsis secondary to urinary tract infection        Chemotherapy     8/8/24: Cycle 1 Carboplatin AUC 5, paclitaxel 175mg/ m2  8/30/24: Cycle 2 Carboplatin AUC 5, paclitaxel 175mg/m2  10/15/24: Cycle 3 Carboplatin AUC 5, paclitaxel 135mg/m2, reduced due to recent sepsis  12/5/24: Plan for cycle 4 Carboplatin AUC 5, paclitaxel 135mg/m2   11/07/2024-11/10/2024 Hospital admission    Admission for UTI, weakness            Past Medical History:     Past Medical History:   Diagnosis Date    Atrial fibrillation (H)     Depression     Hypertension     Malignant neoplasm of endocervix (H)     Tx with radiation    Near syncope 11/7/2024    Orthopnea 9/21/2024    Other chronic pain     Low back    Schizophrenia (H)     Urinary incontinence             Past Surgical History:      Past Surgical History:   Procedure Laterality Date    ABDOMINOPLASTY      BIOPSY CERVICAL, LOCAL EXCISION, SINGLE/MULTIPLE  10/26/2011    Procedure:BIOPSY CERVICAL, LOCAL EXCISION, SINGLE/MULTIPLE; EUA, cervical biopsies; Surgeon:BETTY TINEO; Location:[facility] OR    BIOPSY VAGINAL N/A  6/8/2021    Procedure: BIOPSY, VAGINA;  Surgeon: Dany Perez MD;  Location: [facility] OR    CYSTOSCOPY N/A 5/17/2024    Procedure: Cystoscopy;  Surgeon: Dany Perez MD;  Location: [facility]    CYSTOSTOMY, INSERT TUBE SUPRAPUBIC, COMBINED  7/12/2024    Procedure: Insertion of supra-pubic catheter;  Surgeon: Dany Perez MD;  Location: [facility]    DILATION AND CURETTAGE, WITH ULTRASOUND GUIDANCE N/A 5/17/2024    Procedure: Dilation and curettage of the uterus with drainage of uterine fluid under ultrasound guidance, lysis of vaginal adhesions;  Surgeon: Dany Perez MD;  Location: [facility]    EXAM UNDER ANESTHESIA PELVIC N/A 3/12/2020    Procedure: Exam under anesthsia, vaginal biopsies, possible CO2 laser of the vagina;  Surgeon: Lilliam Roy MD;  [facility]: [facility]    GI SURGERY  2004    gastric bypass    HYSTERECTOMY TOTAL ABDOMINAL, BILATERAL SALPINGO-OOPHORECTOMY, COMBINED Bilateral 7/12/2024    Procedure: Diagnostic laparoscopy converted to exploratory laparotomy, total abdominal hysterectomy, bilateral salpingo-oophorectomy, lysis of adhesions >60 min;  Surgeon: Dany Perez MD;  Location: [facility]    INSERT PORT VASCULAR ACCESS N/A 8/26/2024    Procedure: Insert port vascular access;  Surgeon: Avery Gregory MD;  Location: [facility]    IR CHEST PORT PLACEMENT > 5 YRS OF AGE  8/26/2024    LASER CO2 VAGINA N/A 3/2/2015    Procedure: LASER CO2 VAGINA;  Surgeon: Mariela Abdalla MD;  Location: [facility]    LASER CO2 VAGINA N/A 5/24/2019    Procedure: Exam under anesthesia, vaginal biopsies, CO2 laser of the vagina;  Surgeon: Lilliam Roy MD;  Location: [facility]    LASER CO2 VAGINA N/A 6/8/2021    Procedure: Exam under anesthesia, laser ablation of the upper vagina;  Surgeon: Dany Perez MD;  Location: [facility]    REPAIR BLADDER N/A 7/12/2024    Procedure: Repair bladder;  Surgeon: Dany Perez MD;  Location: [facility]            Social History:     Social History     Tobacco Use    Smoking  status: Never    Smokeless tobacco: Never   Substance Use Topics    Alcohol use: Not Currently            Family History:     Family History   Problem Relation Age of Onset    Heart Disease Father     Heart Failure Father     No Known Problems Brother     No Known Problems Brother     No Known Problems Brother     Pancreatitis Brother     Hypertension Sister     Peripheral Vascular Disease Sister     No Known Problems Sister     No Known Problems Son     No Known Problems Daughter             Immunizations:     Immunization History   Administered Date(s) Administered    COVID-19 12+ (Pfizer) 11/09/2023    COVID-19 Bivalent 12+ (Pfizer) 09/22/2022    COVID-19 MONOVALENT 12+ (Pfizer) 04/19/2021, 05/10/2021, 12/03/2021    COVID-19 Monovalent 12+ (Pfizer 2022) 07/07/2022    DTAP (<7y) 08/05/2008    Hepatitis A (ADULT 19+) 08/05/2008    Hepatitis B, Adult 05/11/2007, 08/05/2008    Influenza (H1N1) 01/14/2010    Influenza (High Dose) Trivalent,PF (Fluzone) 11/07/2019, 10/27/2020, 10/15/2021, 09/22/2022    Influenza (IIV3) PF 10/24/2008, 10/05/2011    Influenza (prior to 2024) 10/27/2008, 01/14/2010    Influenza Vaccine 65+ (Fluzone HD) 10/15/2021, 09/28/2023    Influenza Vaccine >6 months,quad, PF 09/17/2015, 11/02/2016, 08/23/2017, 10/01/2018    Pneumo Conj 13-V (2010&after) 03/29/2019    Pneumococcal 20 valent Conjugate (Prevnar 20) 05/02/2024    Pneumococcal 23 valent 12/17/2008    TDAP (Adacel,Boostrix) 08/05/2008, 11/10/2022    Zoster recombinant adjuvanted (SHINGRIX) 11/05/2020, 10/15/2021            Allergies:     Allergies   Allergen Reactions    Ibuprofen Nausea and Vomiting    Shrimp     Sulfa Antibiotics Rash     Patient started on bactrim 7/24/24 tolerating medication without rash on 7/25             Medications:   (Not in a hospital admission)           Review of Systems:   Review Of Systems  Skin: positive for sacral ulcer  Eyes: negative  Ears/Nose/Throat: negative for, nasal congestion, persistent sore  throat  Respiratory: No shortness of breath, dyspnea on exertion, cough, or hemoptysis  Cardiovascular: negative for, palpitations, and tachycardia  Gastrointestinal: positive for poor appetite and abdominal pain, negative for, nausea, and vomiting  Genitourinary: positive for negative, urinary tract infection, and vaginal discharge, urinary tract infection, and vaginal discharge  Musculoskeletal: negative  Neurologic: positive for dizziness  Psychiatric: positive for altered mental status  Hematologic/Lymphatic/Immunologic: negative  Endocrine: negative           Physical Exam:     Vitals:    11/27/24 0332 11/27/24 0335 11/27/24 0336 11/27/24 0343   BP: 103/81 (!) 87/78 93/65 93/62   BP Location: Left arm Left arm Left arm Left arm   Pulse: (!) 46 59 61 59   Resp:    12   Temp:       TempSrc:       SpO2: 100%   100%     General: NAD, patient lying in bed dozing off  CV: Regular rate, normal S1/S2, no m/r/g  Resp: CTAB, no wheezes, rales, rhonchi  GI: abdomen soft, majority nontender, some mild right suprapubic tenderness  : white watery discharge  Extremities: Non-tender, non-erythematous. 2+ edema in BLE   Back: No CVA tenderness, sacral ulcer with minimal skin breakdown in 3 spots. Skin surrounding covered in barrier cream, but extremely tender to touch and mildly swollen  Neuro: Moving all extremities without difficulties, no noticeable deficits  Psych: Appropriate mood and affect  Skin: No rashes or bruises notable.    Labs/Imaging/Diagnostic Tests     Results for orders placed or performed during the hospital encounter of 11/26/24 (from the past 24 hours)   Glucose by meter   Result Value Ref Range    GLUCOSE BY METER POCT 34 (LL) 70 - 99 mg/dL   CBC with platelets differential    Narrative    The following orders were created for panel order CBC with platelets differential.  Procedure                               Abnormality         Status                     ---------                                -----------         ------                     CBC with platelets and d...[[id number]]  Abnormal            Final result               RBC and Platelet Morphology[[id number]]                                                   Please view results for these tests on the individual orders.   Comprehensive metabolic panel   Result Value Ref Range    Sodium 135 135 - 145 mmol/L    Potassium 2.9 (L) 3.4 - 5.3 mmol/L    Carbon Dioxide (CO2) 25 22 - 29 mmol/L    Anion Gap 10 7 - 15 mmol/L    Urea Nitrogen 20.8 8.0 - 23.0 mg/dL    Creatinine 1.79 (H) 0.51 - 0.95 mg/dL    GFR Estimate 30 (L) >60 mL/min/1.73m2    Calcium 7.2 (L) 8.8 - 10.4 mg/dL    Chloride 100 98 - 107 mmol/L    Glucose 128 (H) 70 - 99 mg/dL    Alkaline Phosphatase 134 40 - 150 U/L    AST 26 0 - 45 U/L    ALT 15 0 - 50 U/L    Protein Total 4.1 (L) 6.4 - 8.3 g/dL    Albumin 1.8 (L) 3.5 - 5.2 g/dL    Bilirubin Total 0.4 <=1.2 mg/dL   Lactic acid whole blood with 1x repeat in 2 hr when >2   Result Value Ref Range    Lactic Acid, Initial 1.6 0.7 - 2.0 mmol/L   CBC with platelets and differential   Result Value Ref Range    WBC Count 9.8 4.0 - 11.0 10e3/uL    RBC Count 3.14 (L) 3.80 - 5.20 10e6/uL    Hemoglobin 9.6 (L) 11.7 - 15.7 g/dL    Hematocrit 30.3 (L) 35.0 - 47.0 %    MCV 97 78 - 100 fL    MCH 30.6 26.5 - 33.0 pg    MCHC 31.7 31.5 - 36.5 g/dL    RDW 17.6 (H) 10.0 - 15.0 %    Platelet Count 142 (L) 150 - 450 10e3/uL    % Neutrophils 70 %    % Lymphocytes 22 %    % Monocytes 7 %    % Eosinophils 0 %    % Basophils 0 %    % Immature Granulocytes 1 %    NRBCs per 100 WBC 0 <1 /100    Absolute Neutrophils 6.9 1.6 - 8.3 10e3/uL    Absolute Lymphocytes 2.2 0.8 - 5.3 10e3/uL    Absolute Monocytes 0.7 0.0 - 1.3 10e3/uL    Absolute Eosinophils 0.0 0.0 - 0.7 10e3/uL    Absolute Basophils 0.0 0.0 - 0.2 10e3/uL    Absolute Immature Granulocytes 0.1 <=0.4 10e3/uL    Absolute NRBCs 0.0 10e3/uL   Magnesium   Result Value Ref Range    Magnesium 1.6 (L) 1.7 - 2.3 mg/dL    Glucose by meter   Result Value Ref Range    GLUCOSE BY METER POCT 54 (L) 70 - 99 mg/dL   EKG 12 lead   Result Value Ref Range    Systolic Blood Pressure  mmHg    Diastolic Blood Pressure  mmHg    Ventricular Rate 64 BPM    Atrial Rate 64 BPM    UT Interval 154 ms    QRS Duration 72 ms     ms    QTc 491 ms    P Axis 72 degrees    R AXIS -2 degrees    T Axis 17 degrees    Interpretation ECG       Sinus rhythm  Low voltage QRS  Cannot rule out Anterior infarct , age undetermined  QTcB >= 480 msec  Abnormal ECG     Glucose by meter   Result Value Ref Range    GLUCOSE BY METER POCT 259 (H) 70 - 99 mg/dL   UA with Microscopic reflex to Culture    Specimen: Urine, Shahid Catheter   Result Value Ref Range    Color Urine Orange (A) Colorless, Straw, Light Yellow, Yellow    Appearance Urine Cloudy (A) Clear    Glucose Urine Negative Negative mg/dL    Bilirubin Urine Negative Negative    Ketones Urine Negative Negative mg/dL    Specific Gravity Urine 1.015 1.003 - 1.035    Blood Urine Large (A) Negative    pH Urine 6.0 5.0 - 7.0    Protein Albumin Urine 300 (A) Negative mg/dL    Urobilinogen Urine Normal Normal, 2.0 mg/dL    Nitrite Urine Negative Negative    Leukocyte Esterase Urine Large (A) Negative    Bacteria Urine Few (A) None Seen /HPF    WBC Clumps Urine Present (A) None Seen /HPF    Mucus Urine Present (A) None Seen /LPF    RBC Urine 107 (H) <=2 /HPF    WBC Urine >182 (H) <=5 /HPF    Squamous Epithelials Urine 9 (H) <=1 /HPF    Hyaline Casts Urine 37 (H) <=2 /LPF    Narrative    Urine Culture ordered based on laboratory criteria   Glucose by meter   Result Value Ref Range    GLUCOSE BY METER POCT 214 (H) 70 - 99 mg/dL   Wet preparation    Specimen: Vagina; Swab   Result Value Ref Range    Trichomonas Absent Absent    Yeast Absent Absent    Clue Cells Absent Absent    WBCs/high power field 2+ (A) None   POC US ECHO LIMITED    Impression    Limited Bedside Cardiac Ultrasound, performed and interpreted by me.    Indication: Hypotension/shock.  parasternal short axis and apical 4 chamber views were acquired.   Image quality was limited    Findings:    No large pericardial effusion    IMPRESSION: No large effusion.      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 After correcting hypoglycemia, the patient's mental status has recovered and the patient is at baseline.    # Urosepsis  # Septic shock  # Chronic sacral wound  The patient has a history of ESBL urosepsis and bacteremia. She has a normal lactate but profound hypotension that has been refractory to 3L of IVF. Possible sources include from UTI, or from her chronic sacral wound that has been growing increasingly painful over the past few weeks. Patient given one dose of Zosyn in the ED. Given history of ESBL, will switch to ertapenem (500mg q24h per pharmacy) when due for a repeat dose. With increasingly painful sacral wound, will also give vancomycin for coverage of skin elisa. Antibiotics will be narrowed after urine, blood, and port cultures come back. Will start pressors given the patient has been unresponsive to 3L IVF.   - Suprapubic cath care. Consult urology for bedside exchange  - Trend morning CMP, CBC, mag, phos, lactic acid  - Creatinine elevated at 1.8 with a baseline of 0.8 and a GFR of 30. Will continue to trend after fluids.     # Serous endometrial adenocarcinoma of uterus  - S/p SNEHA, BSO (7/12/2024),  cystotomy w/ suprapubic catheter, s/p Cycle 3 carbo/taxel (10/15/24). Chemo currently on hold while determining clinical course    # MADHU  # CKD stage 3a  - Creatinine elevated at 1.8 with a baseline of 0.8 and a GFR of 30. Will continue to trend after fluids.   - Trend renal function while inpatient  - Avoid nephrotoxic medications     #Hypomagnesemia  #Hypokalemia  - On presentation, patient with hypokalemia, mild hypomagnesemia. The patient's calcium is 7.6, but corrected for her albumin it's normal at 9.0.   - ERP  - Regular diet  - Encourage PO intake,   - Monitor VS and UOP    # Atrial fibrillation on eliquis  Patient hasn't taken her eliquis since Saturday, she states that sometimes she has a hard time remembering to take medicine. Will readdress starting lovenox and bridging to eliquis once patient is more stable.    # Vaginal burning, itching, discharge  Patient with vaginal discharge, burning, and itching for 2 weeks now.   - Wet prep negative for yeast, trich, and BV. GC/CT pending. Will continue to follow.    # Chronic stable conditions  - Anemia of chronic disease: hemoglobin 9.6  - Atrial Fibrillation: hold PTA Eliquis, atenolol in the setting of possible sepsis  - Asthma: PTA albuterol   - MDD, Schizophrenia: no issues  - Hx/o gastric bypass - avoid NSAIDs    Code status: After discussion with patient and daughter, remains full code.     Patient discussed with Dr. Avina.    Ashwin Mathis MD  Obstetrics and Gynecology, PGY-2  11/27/2024 3:48 AM          4 = No assist / stand by assistance

## 2024-11-27 NOTE — PROGRESS NOTES
ICU Daily Rounding Checklist     Checklist Response Notes   Can sedation be reduced?  Yes    Can analgesia be reduced? Yes    Is delirium being assessed, addressed and prevented? Yes    Spontaneous awakening trial and/or Spontaneous breathing trial candidate?  NA    Total fluid balance goal reviewed?  Yes Target Goals:        Net positive   Is the patient at goals for lung protective ventilation? NA    Head of bed elevation (30 degrees)? Yes    Skin breakdown assessment (prevention) completed? Yes    Is enteral nutrition at goal? Yes NPO   Is blood glucose at goal? Yes    Deep venous thrombosis prophylaxis? Yes PCDs     Gastric ulcer prophylaxis?  NA If coagulopathy (INR-1.5 PTT2x normal. Ph<50k), mechanical ventilation 48hr, history of GI bleed/ulcer within past year. TBL, SCI, or burn, or if >= 2 minor risk factors (sepsis, ICU stay 1 week, occult GI bleed > 6 days. glucocorticoid therapy, NSAID use, antiplatelet use)   Can Antibiotics be narrowed or discontinued? Yes Discontinue vancomycin   Early mobility candidate and physical therapy consulted? Yes    Is kent catheter needed? Yes    Is central venous/arterial catheter needed? Yes    Has the family been updated? Yes    Are the patient's goals of care and code status current? Yes

## 2024-11-27 NOTE — CONSULTS
[facility]  WO Nurse Inpatient Assessment     Consulted for: sacrum    Summary: Chronic PI, site of previously healed PI, open to stage 2. 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    Skin:  # Decubitus ulcer  - WOC consulted     Assessment:      Areas visualized during today's visit: Focused:, Sacrum/coccyx     Pressure Injury Location: Coccyx     11/8 11/27    Last photo: 11/27  Wound type: Pressure Injury and Shear, MASD/IAD     Pressure Injury Stage: site of previously healed pressure injury, unknown previous stage.  present on admission       Wound history/plan of care:   Pt well known to the [facility] service. Pt has chronic skin break down to sacrum/coccyx area. Per Pt she spends most of her time in her recliner  and wheelchair, has cushion for recliner and wheelchair.She reports weakness and needing assistance for repositioning and mobilizing Yaa-wound skin is a mixture of scar tissue and new epithelial tissue. Redness that is visible and blanchable, Incontinence associated skin breakdown, MASD to bilateral inner buttocks. Patient endorse multiple loose stools recently     Wound base: 100 % dermis      Palpation of the wound bed: normal      Drainage: none     Description of drainage: none     Measurements (length x width x depth, in cm) 2.2 x 1.3 x 0.2cm - three small open areas within this  "dimension     Tunneling N/A     Undermining N/A  Periwound skin: Scar tissue      Color: normal and consistent with surrounding tissue      Temperature: normal   Odor: none  Pain: moderate, sharp and tender  Pain intervention prior to dressing change: slow and gentle cares   Treatment goal: Heal  and Protection  STATUS: initial assessment  Supplies ordered: at bedside     My PI Risk Assessment     Sensory Perception: 3 - Slightly Limited     Moisture: 3 - Occasionally moist      Activity: 2-chairfast     Mobility: 2 - Very limited     Nutrition: 3 - Adequate     Friction/Shear: 1 - Problem     TOTAL: 14      Treatment Plan:     sacrum  Q shift, Every 3 days and as needed  Peel back dressing Q shift for evaluation, reapply   Cleanse the area with NS and pat dry.  Apply No sting film barrier to periwound skin.  Cover wound with Sacral Mepilex (#731193)  Turn and reposition Q 2hrs side to side only.  Ensure pt has Tanner-cushion while sitting up in the chair.  FYI- If pt has constant incontinent loose stools needing dressing changes Q shift please discontinue the Mepilex dressing and apply criticaid barrier paste BID and PRN.      Perianal: BID and as needed  Cleanse the area with Marry cleanse and protect, very gently with soft cloth.  Apply THIN layer of critic aid paste on top of it.  With repeat application, do not scrub the paste, only remove soiled paste and reapply.  If complete removal of paste is necessary use baby oil/mineral oil (#581398) and soft wash cloth.  Ensure pt has Tanner-cushion (#992731) while sitting up in the chair.  Use only one Covidien pad in between mattress and pt. No brief while in bed.     Pressure Injury Prevention (PIP) Plan:  If patient is declining pressure injury prevention interventions: Explore reason why and address patient's concerns, Educate on pressure injury risk and prevention intervention(s), If patient is still declining, document \"informed refusal\" , and Ensure Care team is aware " ( provider, charge nurse, etc)  Mattress: Follow bed algorithm, add Low Air Loss (Air+) mattress pump if skin is very moist or constantly moist.   HOB: Maintain at or below 30 degrees, unless contraindicated  Repositioning in bed: Every 1-2 hours , Left/right positioning; avoid supine, Raise foot of bed prior to raising head of bed, to reduce patient sliding down (shear), and Frequent microturns using positioning wedges, as patient tolerates  Heels: Keep elevated off mattress, Pillows under calves, and Heel lift boots  Protective Dressing: Sacral Mepilex for prevention (#378432),  especially for the agitated patient   Positioning Equipment:Positioning wedges (#965332) to help maintain 30 degree side lying position   Chair positioning: Chair cushion (#794607) , Assist patient to reposition hourly, and Do NOT use a donut for sitting (this increases pressure to smaller area and creates a higher potential for injury)    If patient has a buttock pressure injury, or high risk for PI use chair cushion or SPS.  Moisture Management: Perineal cleansing /protection: Follow Incontinence Protocol, Avoid brief in bed, Clean and dry skin folds with bathing , Consider InterDry (#438738) between folds, and Moisturize dry skin  Under Devices: Inspect skin under all medical devices during skin inspection , Ensure tubes are stabilized without tension, and Ensure patient is not lying on medical devices or equipment when repositioned  Ask provider to discontinue device when no longer needed.      Orders: Written    RECOMMEND PRIMARY TEAM ORDER: None, at this time  Education provided: importance of repositioning, plan of care, wound progress, Infection prevention , Moisture management, Hygiene, and Off-loading pressure  Discussed plan of care with: Patient and Nurse  WOC nurse follow-up plan: weekly  Notify WOC if wound(s) deteriorate.  Nursing to notify the Provider(s) and re-consult the WOC Nurse if new skin concern.    DATA:      Current support surface: Standard  Low air loss (RADU pump, Isolibrium, Pulsate)  Containment of urine/stool: Incontinence Protocol and Incontinent pad in bed  BMI: Body mass index is 28.35 kg/m .   Active diet order: Orders Placed This Encounter      NPO for Medical/Clinical Reasons Except for: Meds     Output: I/O last 3 completed shifts:  In: 2238.84 [I.V.:638.84; IV Piggyback:1600]  Out: 150 [Urine:150]     Labs:   Recent Labs   Lab 11/27/24  0550 11/26/24  2250   ALBUMIN  --  1.8*   HGB 9.6* 9.6*   INR 1.09  --    WBC 11.0 9.8     Pressure injury risk assessment:                          Yoli Montes RN, BSN, CWOCN   Pager no longer is use, please contact through [employer] group: [facility] Nurse [place]  Dept. Office Number: [phone]

## 2024-11-27 NOTE — CONSULTS
"      [facility] Consult Service Note  Interventional Radiology  11/27/24   3:59 PM    Consult Requested: \"Left PNT placement    Recommendations/Plan:    IR would like to hold off on left PNT placement for now. We can revisit Friday. We have asked the urology and ICU teams to contact us on Friday.     Our concerns are:  No leukocytosis  Hx of intermittent hydronephrosis since 5/2024  Unclear source of hypotension  Patient has no fever  CRP is less than it was a few months ago  Patient has a suprapubic tube, so her urine is likely colonized    Discussed case with ICU Dr. Wen, Dr. Nation and ID (Dr. Shoemaker with Dr. Mascorro).     We would prefer to have her hypotension workup completed prior to PNT placement.     Case and imaging discussed with IR attending Dr. Jonathan Mascorro MD   Recommendations were reviewed with Dr. 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  IR is being asked to place a left PNT placement. Patient has left moderate hydronephrotic and MADHU.        Pertinent Imaging Reviewed:  Recent Results (from the past 24 hours)   POC US ECHO LIMITED    Impression    Limited Bedside Cardiac Ultrasound, performed and interpreted by me.   Indication: Hypotension/shock.  parasternal short axis and apical 4 chamber views were acquired.   Image quality was limited    Findings:    No large pericardial effusion    IMPRESSION: No large effusion.      CT Chest Abdomen Pelvis w/o " Contrast    Narrative    EXAM: CT CHEST ABDOMEN PELVIS W/O CONTRAST  LOCATION: [facility]  DATE: 11/27/2024    INDICATION: septic shock w u, hx uterine ca, sacral ulcers, urosepsis  COMPARISON: CT abdomen/pelvis with contrast 11/07/2024, CT chest/abdomen/pelvis with contrast 05/03/2024  TECHNIQUE: CT scan of the chest, abdomen, and pelvis was performed without IV contrast. Multiplanar reformats were obtained. Dose reduction techniques were used.   CONTRAST: None.    FINDINGS:   LUNGS AND PLEURA: Left lower lobe calcified granuloma. No new suspicious pulmonary nodule. Dependent atelectasis. No focal pulmonary consolidation otherwise, or significant pleural effusion.    MEDIASTINUM/AXILLAE: Calcified mediastinal and hilar lymph nodes compatible with prior granulomatous disease. Atherosclerotic calcifications of the aortic arch. No significant pericardial effusion.    CORONARY ARTERY CALCIFICATION: Mild.    HEPATOBILIARY: Unremarkable noncontrast CT appearance of the liver. Calcified hepatic granuloma. Cholelithiasis. Mild gallbladder distention.    PANCREAS: Somewhat atrophic, otherwise unremarkable.    SPLEEN: Calcified splenic granulomas.    ADRENAL GLANDS: Normal.    KIDNEYS/BLADDER: Previously noted right hydronephrosis has resolved. Persistent moderate left hydroureteronephrosis which extends down to level of the urinary bladder. Urinary bladder is decompressed with a suprapubic catheter in place.    BOWEL: Moderate colonic stool. No bowel obstruction. Gastric bypass. Trace pelvic ascites.    LYMPH NODES: Normal.    VASCULATURE: Dense atherosclerotic calcifications of the aortoiliac vessels without evidence of aneurysmal dilatation.    PELVIC ORGANS: Hysterectomy and oophorectomy.    MUSCULOSKELETAL: Anasarca. Right groin lipoma. Multilevel degenerative changes of the cervicothoracic and lumbosacral spine. No acute osseous abnormality or suspicious bony lesion.       Impression    IMPRESSION:  1.  Anasarca.  2.  Previously noted right hydronephrosis has resolved. Persistent moderate left hydroureteronephrosis which extends down to level of the urinary bladder. Urinary bladder is decompressed with a suprapubic catheter in place.  3.  Moderate colonic stool.  4.  Trace pelvic ascites.  5.  Cholelithiasis. Mild gallbladder distention. If cholecystitis is clinically suspected, a right upper quadrant ultrasound could be considered.  6.  No other acute process within the chest, abdomen, or pelvis.   US Abdomen Limited    Narrative    EXAMINATION: Limited Abdominal Ultrasound, 11/27/2024 8:58 AM     COMPARISON: CT 11/27/2024.    HISTORY: cholelithiasis w/ mild gallbladder distention on CT. Please  evaluate the gallbladder and CBD    FINDINGS:   Fluid: No evidence of ascites or pleural effusions.    Liver: The liver demonstrates normal echotexture, measuring 13.7 cm in  craniocaudal dimension. Within the left hepatic lobe is an anechoic  avascular cyst measuring up to 0.8 cm.     Gallbladder: Cholelithiasis and biliary sludge. There is no wall  thickening, pericholecystic fluid, or positive sonographic Rebollar's  sign.    Bile Ducts: Both the intra- and extrahepatic biliary system are of  normal caliber.  The common bile duct was not visualized.    Pancreas: Not visualized secondary to shadowing bowel gas.    Kidney: Not visualized secondary to shadowing bowel gas.      Impression    IMPRESSION: Exam limited by patient body habitus and shadowing bowel  gas.  1.  Cholelithiasis and biliary sludge without evidence of acute  cholecystitis.  2.  The common bile duct, pancreas, and right kidney were not  visualized.  3.  Simple appearing 0.8 cm left hepatic cyst.    I have personally reviewed the examination and initial interpretation  and I agree with the findings.    LUCHO MARQUEZ MD         SYSTEM ID:  [id number]       Expected date of discharge:  11/29/2024    Vitals:   BP (!) 84/58    "Pulse 57   Temp 98.1  F (36.7  C) (Axillary)   Resp 12   Ht 1.6 m (5' 3\")   Wt 72.6 kg (160 lb 0.9 oz)   SpO2 100%   BMI 28.35 kg/m      Pertinent Labs Reviewed:  CBC:  Lab Results   Component Value Date    WBC 11.0 11/27/2024    WBC 9.8 11/26/2024    WBC 4.9 11/10/2024    WBC 3.9 (L) 01/26/2015     Lab Results   Component Value Date    HGB 9.6 11/27/2024    HGB 9.6 11/26/2024    HGB 7.6 11/10/2024    HGB 11.9 01/26/2015     Lab Results   Component Value Date     11/27/2024     11/26/2024     11/10/2024     01/26/2015    INR:  Lab Results   Component Value Date    INR 1.09 11/27/2024    PTT 26 09/21/2024          COVID Results:  COVID-19 Antibody Results, Testing for Immunity           No data to display              COVID-19 PCR Results          7/29/2024    18:01 11/27/2024    06:07   COVID-19 PCR Results   SARS CoV2 PCR Negative  Negative     Potassium   Date Value Ref Range Status   11/27/2024 4.3 3.4 - 5.3 mmol/L Final     Comment:     Specimen slightly hemolyzed. The reported potassium value may be falsely elevated. Analysis of a non-hemolyzed specimen (i.e. re-draw) may result in a lower potassium value.   05/16/2019 4.3 3.5 - 5.3 mEq/L Final     Potassium POCT   Date Value Ref Range Status   07/12/2024 3.2 (L) 3.4 - 5.3 mmol/L Final          Iza Contreras PA-C  Interventional Radiology  Pager: [phone]    "

## 2024-11-27 NOTE — PROGRESS NOTES
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    CHANGES and MAJOR THINGS TODAY:   - trend lactic acid  - Monitor cardiac function with SVO2 q4h  - 1L fluid bolus  - start hydrocortisone, wean norepi as possible  - NPO and hold apixaban pending urology consult  - For hypogylcemia and NPO status - D10 gtt (25ml/hr)  - Restart PTA meds and EKG (sertraline 150mg, seroquel 400mg BID, gabapentin 800 QID > ordered TID and reassess, tizanidine PRN)  - Urology consult: possible catheter exchange and percutaneous nephrostomy tube for L hydronephrosis  - central line    PLAN:    Neuro:  # Multipfactorial sepsis and hypoglycemic encephalopathy (resolved)  Per report, pt presented with AMS characterized by confusion. No recent falls or head trauma. No focal deficits on exam. Ddx includes hypoglycemia (blood glucose of 34 on arrival) vs sepsis vs shock. AMS resolved in ED after treatment with IVF, antibiotics, and D50.   - CTM     # Sedation  Wakens easily to voice. Not on sedation.   - No sedation needed   - RASS goal 0     # Acute on chronic abdominal pain   # Chronic back pain   # Chronic knee pain   Pt takes 5 mg oxycodone q6h PRN at home for chronic abdominal pain (2/2 presumed uterine cancer and abdominal surgeries) and back pain. Presented with worsening abdominal pain likely 2/2 UTI. No evidence  of nephrolithiasis on CT CAP.  - tylenol PRN   - continue PTA oxycodone 5 mg q6h PRN  - dilaudid 0.3 q4h for break through pain   - PTA diclofenac gel for knees   - 200mg TID gabapentin (PTA dose 800mg QID)  - additional UTI plan in ID section    #Paranoid schizophrenia  PTA Benztropine, sertraline, tizanidine, hold zolpidem  - melatonin qHS     Pulmonary:  # Asthma   No evidence for asthma flare at this time. Breathing comfortably on RA.   - continue PTA albuterol inhaler PRN     Cardiovascular:  # Septic shock   # Chronotropic incompetence  Presented w/ BP 88/59, hypothermia w/ temp of 35.7 C, and UA c/f UTI. LA wnl on arrival but now rising. Given 1.5 L of NS in ED w/o much improvement in BP. On levophed through pt's port. Shock exacerbated by chronotropic incompetence, likely due to PTA medication of atenolol. Expect clearance in the next 24h, if still bradycardic, consider Cardiology consult.  - trend lactate  - infectious workup/management per ID section   - 1L IVF  - Levophed PRN w/ MAP goal > 65   - goal SVO2 > 70, trend VBG and lactate q4h to direct IVF treatment    # Afib  Currently not in RVR, instead bradycardic. Has PTA atenolol which may be contributing to chronotropic incompetence in setting of sepsis.   - hold PTA apixaban pending possible urologic procedure   - hold PTA atenolol     GI/Nutrition:  # Nutrition  # Hx of gastric bypass   - NPO pending possible urological procedure  - dietician consult placed    # Cholelithiasis and mild gallbladder distention  Findings seen on CT abd. Pt denies any RUQ pain. RUQ US negative for cholecystitis.  - CTM     Renal/Fluids/Electrolytes:  # MADHU w/ persistent left hydronephrosis   # Hx R hydronephrosis (resolved)  # CKD stage 3a  Prior to her most recent cancer surgery the patient was having significant urinary dysfunction likely secondary to history of radiation therapy. Thus at the time of hysterectomy with cystotomy decision was made to place suprapubic  catheter due to her poor quality of life preoperatively. Cr 1.79 on admission, up from baseline ~0.9. CT CAP w/o evidence of nephrolithiasis but presence of persistent moderate left hydroureteronephrosis which extends down to level of the urinary bladder. Obstructive uropathy and septic shock contributing to MADHU.  - Urology consulted, appreciate recs                - NPO and PTA apixaban held iso sepsis and possible procedure  - 1L IVF  - daily BMP     # Hypokalemia  # Hypomagnesemia   K 2.9 and Mg 1.6 on ED arrival, continuing to monitor and replete as needed.  - BMP, replace lytes PRN  - Mg replacement protocol  - K replacement protocol  - Phos replacement protocol    Endocrine:  # Hypoglycemia   Glucose 39 on arrival to ED. Given D50 x 2 with most recent glucose 113. Likely 2/2 poor oral intake in setting of sepsis.   - D10 drip 25ml/h  - start diet after urology consult  - hypoglycemia protocol      ID:  # Urosepsis   # Hx of cystotomy s/p suprapubic catheter (7/12/24) w/ resulting recurrent ESBL (Klebsiella) urosepsis  Recent diagnosis of uterine cancer s/p hysterectomy w/ unfortunate complication of iatrogenic cystostomy. Cystostomy repaired 7/12/24 but given the challenge of the repair and damage to bladder from prior pelvic radiation, suprapubic catheter was placed. Since then, pt has had three UTIs (Klebsiella ESBL x 2 and E. Faecium and candida albicans). Now presenting with her fourth UTI. CT w/o nephrolithiasis but does show diffuse moderate L sided hydroureteronephrosis.   - Urology consulted: query replacement of suprapubic catheter, possible nephrostomy tube  - blood culture peripheral and from port pending  - urine culture pending  - COVID/RSV/influenza pending  - MRSA nares pending   - s/p IV Zosyn x 1.   - Continue Ertapenem 11/27-**  - Stop vancomycin, MRSA negative  - give 1L fluids  - hydrocortisone 50 q6h until shock resolves  - if pressor need increases consider vasopressin given afib hx in  order to spare levophed  - blood cultures from port  - TPA flush from port  - place arterial line to monitor BP    # Vaginal burning, itching, discharge  Patient with vaginal discharge, burning, and itching for 2 weeks now.   - Wet prep negative for yeast, trich, and BV. GC/CT pending. Treat accordingly     Hematology:    # Serous endometrial adenocarcinoma of uterus  - S/p SNEHA, BSO (7/12/2024), cystotomy w/ suprapubic catheter, s/p Cycle 3 carbo/taxel (10/15/24). Chemo currently on hold while determining clinical course.    #Anemia of chronic disease: hemoglobin 9.6, stable     Musculoskeletal:  # Deconditioning  - would benefit from PT/OT consult once off pressors      # LE edema, chronic   - lymphedema consult placed     Skin:  # Decubitus ulcer  - WOC consulted     General Cares/Prophylaxis:    DVT Prophylaxis: Pneumatic Compression Devices  GI Prophylaxis: Not indicated  Restraints: none  Family Communication:   Code Status: Full    Lines/tubes/drains:  - PIV x2 and port   - planning to place arterial line     Disposition:  - Medical ICU    Patient seen and findings/plan discussed with medical ICU staff, Dr. Carpenter.    Anali Nation MD    Clinically Significant Risk Factors Present on Admission        # Hypokalemia: Lowest K = 2.9 mmol/L in last 2 days, will replace as needed    # Hypocalcemia: Lowest iCa = 3.7 mg/dL in last 2 days, will monitor and replace as appropriate   # Hypomagnesemia: Lowest Mg = 1.6 mg/dL in last 2 days, will replace as needed   # Hypoalbuminemia: Lowest albumin = 1.8 g/dL at 11/26/2024 10:50 PM, will monitor as appropriate  # Drug Induced Coagulation Defect: home medication list includes an anticoagulant medication   # Acute Kidney Injury, unspecified: based on a >150% or 0.3 mg/dL increase in last creatinine compared to past 90 day average, will monitor renal function  # Hypertension: Noted on problem list   # Circulatory Shock: required vasopressors within past 24 hours       #  "Anemia: based on hgb <11       # Overweight: Estimated body mass index is 28.35 kg/m  as calculated from the following:    Height as of this encounter: 1.6 m (5' 3\").    Weight as of this encounter: 72.6 kg (160 lb 0.9 oz).       # Financial/Environmental Concerns:        # Anemia: based on hgb <11             ====================================  INTERVAL HISTORY:   In the morning, patient alert and responding to questions appropriately. Endorsed abdominal pain that improved with pain medication. Denies fevers, endorses chills, denies nausea.     Spoke with daughter on phone, obtained consent for arterial line placement. Updated her on the plan to continue antibiotics, give IVF, and continue pressors for BP support.    OBJECTIVE:   1. VITAL SIGNS:   Temp:  [96.1  F (35.6  C)-97.4  F (36.3  C)] 97.4  F (36.3  C)  Pulse:  [46-75] 54  Resp:  [12-16] 14  BP: ()/(46-81) 80/54  SpO2:  [98 %-100 %] 100 %  Resp: 14  2. INTAKE/ OUTPUT:   I/O last 3 completed shifts:  In: 2238.84 [I.V.:638.84; IV Piggyback:1600]  Out: 150 [Urine:150]    3. PHYSICAL EXAMINATION:  General: NAD, lying in bed, alert, cachectic appearing  HEENT: normocephalic, atraumatic  Neuro: A&Ox3, no formal CN exam completed however no gross abnormalities, moves all extremities  Pulm/Resp: Clear breath sounds bilaterally without rhonchi, crackles or wheeze, breathing non-labored  CV: RRR, no additional heart sounds  Abdomen: Soft, non-distended, diffuse tenderness, suprpubic catheter in place  Incisions/Skin: sacral decubitus ulcer (media tab)    4. LABS:   Arterial Blood Gases   No lab results found in last 7 days.  Complete Blood Count   Recent Labs   Lab 11/27/24  0550 11/26/24  2250   WBC 11.0 9.8   HGB 9.6* 9.6*    142*     Basic Metabolic Panel  Recent Labs   Lab 11/27/24  0451 11/27/24  0317 11/27/24  0127 11/26/24  2348 11/26/24 2259 11/26/24 2250   NA  --   --   --   --   --  135   POTASSIUM  --   --   --   --   --  2.9*   CHLORIDE  " --   --   --   --   --  100   CO2  --   --   --   --   --  25   BUN  --   --   --   --   --  20.8   CR  --   --   --   --   --  1.79*   * 113* 165* 214*   < > 128*    < > = values in this interval not displayed.     Liver Function Tests  Recent Labs   Lab 11/27/24  0550 11/26/24  2250   AST  --  26   ALT  --  15   ALKPHOS  --  134   BILITOTAL  --  0.4   ALBUMIN  --  1.8*   INR 1.09  --      Coagulation Profile  Recent Labs   Lab 11/27/24  0550   INR 1.09       5. RADIOLOGY:   Recent Results (from the past 24 hours)   POC US ECHO LIMITED    Impression    Limited Bedside Cardiac Ultrasound, performed and interpreted by me.   Indication: Hypotension/shock.  parasternal short axis and apical 4 chamber views were acquired.   Image quality was limited    Findings:    No large pericardial effusion    IMPRESSION: No large effusion.      CT Chest Abdomen Pelvis w/o Contrast    Narrative    EXAM: CT CHEST ABDOMEN PELVIS W/O CONTRAST  LOCATION: [facility]  DATE: 11/27/2024    INDICATION: septic shock w u, hx uterine ca, sacral ulcers, urosepsis  COMPARISON: CT abdomen/pelvis with contrast 11/07/2024, CT chest/abdomen/pelvis with contrast 05/03/2024  TECHNIQUE: CT scan of the chest, abdomen, and pelvis was performed without IV contrast. Multiplanar reformats were obtained. Dose reduction techniques were used.   CONTRAST: None.    FINDINGS:   LUNGS AND PLEURA: Left lower lobe calcified granuloma. No new suspicious pulmonary nodule. Dependent atelectasis. No focal pulmonary consolidation otherwise, or significant pleural effusion.    MEDIASTINUM/AXILLAE: Calcified mediastinal and hilar lymph nodes compatible with prior granulomatous disease. Atherosclerotic calcifications of the aortic arch. No significant pericardial effusion.    CORONARY ARTERY CALCIFICATION: Mild.    HEPATOBILIARY: Unremarkable noncontrast CT appearance of the liver. Calcified hepatic granuloma.  Cholelithiasis. Mild gallbladder distention.    PANCREAS: Somewhat atrophic, otherwise unremarkable.    SPLEEN: Calcified splenic granulomas.    ADRENAL GLANDS: Normal.    KIDNEYS/BLADDER: Previously noted right hydronephrosis has resolved. Persistent moderate left hydroureteronephrosis which extends down to level of the urinary bladder. Urinary bladder is decompressed with a suprapubic catheter in place.    BOWEL: Moderate colonic stool. No bowel obstruction. Gastric bypass. Trace pelvic ascites.    LYMPH NODES: Normal.    VASCULATURE: Dense atherosclerotic calcifications of the aortoiliac vessels without evidence of aneurysmal dilatation.    PELVIC ORGANS: Hysterectomy and oophorectomy.    MUSCULOSKELETAL: Anasarca. Right groin lipoma. Multilevel degenerative changes of the cervicothoracic and lumbosacral spine. No acute osseous abnormality or suspicious bony lesion.      Impression    IMPRESSION:  1.  Anasarca.  2.  Previously noted right hydronephrosis has resolved. Persistent moderate left hydroureteronephrosis which extends down to level of the urinary bladder. Urinary bladder is decompressed with a suprapubic catheter in place.  3.  Moderate colonic stool.  4.  Trace pelvic ascites.  5.  Cholelithiasis. Mild gallbladder distention. If cholecystitis is clinically suspected, a right upper quadrant ultrasound could be considered.  6.  No other acute process within the chest, abdomen, or pelvis.

## 2024-11-27 NOTE — ED PROVIDER NOTES
[place] EMERGENCY DEPARTMENT ([facility])    11/26/24       ED PROVIDER NOTE    History     Chief Complaint   Patient presents with    Abdominal Pain     The history is provided by the patient, medical records and the EMS personnel.     Alis Hartman is a 69 year old female with a history of uterine serous adenocarcinoma s/p SNEHA with BSO on 7/12/2024, cystotomy s/p suprapubic catheter w/ hx of ESBL urosepsis & bacteremia, paranoid schizophrenia, A-fib anticoagulated on eliquis, HTN, CKD III, and anemia who presents to the ED via EMS from home for evaluation of abdominal pain and confusion.  Per EMS, daughter called due to increased confusion and abdominal pain.  She noted cloudy urine as well.  Initial blood glucose for EMS was 59, patient was given a 15 g oral glucose, and upon recheck blood glucose was 42.  She was then given 25 g of dextrose.  Patient reports her abdominal pain feels similar to prior UTIs.  Her symptoms began today.  She denies any fevers.  Denies any catheter issues.  No other symptoms noted.      Past Medical History  Past Medical History:   Diagnosis Date    Atrial fibrillation (H)     Depression     Hypertension     Malignant neoplasm of endocervix (H)     Tx with radiation    Near syncope 11/7/2024    Orthopnea 9/21/2024    Other chronic pain     Low back    Schizophrenia (H)     Urinary incontinence      Past Surgical History:   Procedure Laterality Date    ABDOMINOPLASTY      BIOPSY CERVICAL, LOCAL EXCISION, SINGLE/MULTIPLE  10/26/2011    Procedure:BIOPSY CERVICAL, LOCAL EXCISION, SINGLE/MULTIPLE; EUA, cervical biopsies; Surgeon:BETTY TINEO; Location:[facility] OR    BIOPSY VAGINAL N/A 6/8/2021    Procedure: BIOPSY, VAGINA;  Surgeon: Dany Perez MD;  Location: [facility] OR    CYSTOSCOPY N/A 5/17/2024    Procedure: Cystoscopy;  Surgeon: Dany Perez MD;  Location: [facility] OR    CYSTOSTOMY, INSERT TUBE SUPRAPUBIC, COMBINED  7/12/2024    Procedure: Insertion of supra-pubic  catheter;  Surgeon: Dany Perez MD;  Location: [facility]    DILATION AND CURETTAGE, WITH ULTRASOUND GUIDANCE N/A 5/17/2024    Procedure: Dilation and curettage of the uterus with drainage of uterine fluid under ultrasound guidance, lysis of vaginal adhesions;  Surgeon: Dany Perez MD;  Location: [facility]    EXAM UNDER ANESTHESIA PELVIC N/A 3/12/2020    Procedure: Exam under anesthsia, vaginal biopsies, possible CO2 laser of the vagina;  Surgeon: Lilliam Roy MD;  [facility]: [facility]    GI SURGERY  2004    gastric bypass    HYSTERECTOMY TOTAL ABDOMINAL, BILATERAL SALPINGO-OOPHORECTOMY, COMBINED Bilateral 7/12/2024    Procedure: Diagnostic laparoscopy converted to exploratory laparotomy, total abdominal hysterectomy, bilateral salpingo-oophorectomy, lysis of adhesions >60 min;  Surgeon: Dany Perez MD;  Location: [facility]    INSERT PORT VASCULAR ACCESS N/A 8/26/2024    Procedure: Insert port vascular access;  Surgeon: Avery Gregory MD;  Location: [facility] OR    IR CHEST PORT PLACEMENT > 5 YRS OF AGE  8/26/2024    LASER CO2 VAGINA N/A 3/2/2015    Procedure: LASER CO2 VAGINA;  Surgeon: Mariela Abdalla MD;  Location: [facility] OR    LASER CO2 VAGINA N/A 5/24/2019    Procedure: Exam under anesthesia, vaginal biopsies, CO2 laser of the vagina;  Surgeon: Lilliam Roy MD;  Location: [facility] OR    LASER CO2 VAGINA N/A 6/8/2021    Procedure: Exam under anesthesia, laser ablation of the upper vagina;  Surgeon: Dany Perez MD;  Location: [facility]    REPAIR BLADDER N/A 7/12/2024    Procedure: Repair bladder;  Surgeon: Dany Perez MD;  Location: [facility]     acetaminophen (TYLENOL) 325 MG tablet  albuterol (PROAIR HFA/PROVENTIL HFA/VENTOLIN HFA) 108 (90 Base) MCG/ACT inhaler  apixaban ANTICOAGULANT (ELIQUIS) 2.5 MG tablet  atenolol (TENORMIN) 25 MG tablet  calcium Citrate-vitamin D 500-400 MG-UNIT CHEW  childrens multivitamin w/iron (FLINTSTONES COMPLETE) 60 MG chewable tablet  cholecalciferol 125 MCG (5000  "UT) CAPS  cyanocobalamin (CYANOCOBALAMIN) 1000 MCG/ML injection  dexAMETHasone (DECADRON) 4 MG tablet  diclofenac (VOLTAREN) 1 % topical gel  ferrous sulfate (CASSANDRA-IN-SOL) 75 (15 FE) MG/ML oral drops  hydrocortisone 2.5 % cream  lidocaine (XYLOCAINE) 5 % external ointment  naloxone (NARCAN) 4 MG/0.1ML nasal spray  ondansetron (ZOFRAN) 8 MG tablet  oxyCODONE (ROXICODONE) 5 MG tablet  polyethylene glycol (MIRALAX) 17 GM/Dose powder  prochlorperazine (COMPAZINE) 5 MG tablet  senna-docusate (SENOKOT-S/PERICOLACE) 8.6-50 MG tablet  Skin Protectants, Misc. (INTERDRY 10\"X36\") SHEE  triamcinolone (KENALOG) 0.1 % external ointment  zolpidem (AMBIEN) 10 MG tablet      Allergies   Allergen Reactions    Ibuprofen Nausea and Vomiting    Shrimp     Sulfa Antibiotics Rash     Patient started on bactrim 7/24/24 tolerating medication without rash on 7/25      Family History  Family History   Problem Relation Age of Onset    Heart Disease Father     Heart Failure Father     No Known Problems Brother     No Known Problems Brother     No Known Problems Brother     Pancreatitis Brother     Hypertension Sister     Peripheral Vascular Disease Sister     No Known Problems Sister     No Known Problems Son     No Known Problems Daughter      Social History   Social History     Tobacco Use    Smoking status: Never    Smokeless tobacco: Never   Substance Use Topics    Alcohol use: Not Currently    Drug use: No      Past medical history, past surgical history, medications, allergies, family history, and social history were reviewed with the patient. No additional pertinent items.     A complete review of systems was performed with pertinent positives and negatives noted in the HPI, and all other systems negative.    Physical Exam   BP: (!) 69/51  Pulse: 62    Physical Exam  Vitals and nursing note reviewed.   Constitutional:       General: She is not in acute distress.     Appearance: She is well-developed. She is ill-appearing. She is not " diaphoretic.   HENT:      Head: Normocephalic and atraumatic.      Mouth/Throat:      Pharynx: No oropharyngeal exudate.   Eyes:      General: No scleral icterus.        Right eye: No discharge.         Left eye: No discharge.      Pupils: Pupils are equal, round, and reactive to light.   Cardiovascular:      Rate and Rhythm: Normal rate and regular rhythm.      Heart sounds: Normal heart sounds. No murmur heard.     No friction rub. No gallop.      Comments: Port in right upper chest.  Pulmonary:      Effort: Pulmonary effort is normal. No respiratory distress.      Breath sounds: Normal breath sounds. No wheezing.   Chest:      Chest wall: No tenderness.   Abdominal:      General: Bowel sounds are normal. There is no distension.      Palpations: Abdomen is soft.      Tenderness: There is no abdominal tenderness.   Genitourinary:     Comments: Chronic indwelling catheter  Musculoskeletal:         General: No tenderness or deformity. Normal range of motion.      Cervical back: Normal range of motion and neck supple.   Skin:     General: Skin is warm and dry.      Coloration: Skin is not pale.      Findings: No erythema or rash.   Neurological:      Mental Status: She is alert and oriented to person, place, and time.      Cranial Nerves: No cranial nerve deficit.           ED Course, Procedures, & Data      Procedures            EKG Interpretation:      Interpreted by Gustavo Hoff DO  Time reviewed: 2309  Symptoms at time of EKG: generalized weakness, abdominal pain   Rhythm: normal sinus   Rate: 64  Axis: Normal  Ectopy: none  Conduction: normal  ST Segments/ T Waves: Low voltage QRS, QTc 491  Q Waves: none  Comparison to prior: 11/7/2024, previous showed sinus tachycardia with a rate of 104.    Clinical Impression: non-specific EKG                 No results found for any visits on 11/26/24.  Medications - No data to display  Labs Ordered and Resulted from Time of ED Arrival to Time of ED Departure - No data to  display  No orders to display              Assessment & Plan    This 69-year-old female with a history of cervical cancer with chronic indwelling catheter who presents with confusion and abdominal pain.  Patient notes feeling generally unwell and having abdominal pain similar to previous UTIs.  She was noted to be hypoglycemic and hypotensive upon arrival with blood pressure as low as 69/51 and blood glucose of 34.  Patient was given D50 x 2 and IV fluids.  Blood pressure and blood glucose improved.  Lab work shows MADHU with creatinine pain of 1.79 up from 0.97.  UA shows UTI.  Blood cultures are pending.  Patient was given piperacillin/tazobactam.  Potassium was noted to be 2.9 and repletion was started.  Discussed case with the Gyn/Onc service.  They will admit to their service.    I have reviewed the nursing notes. I have reviewed the findings, diagnosis, plan and need for follow up with the patient.    New Prescriptions    No medications on file       Final diagnoses:   None   I, Jeanne Dao, am serving as a trained medical scribe to document services personally performed by Gustavo Hoff DO based on the provider's statements to me on November 26, 2024.  This document has been checked and approved by the attending provider.    I, Gustavo Hoff DO, was physically present and have reviewed and verified the accuracy of this note documented by Jeanne Dao medical scribe.      Gustavo Hoff DO  [facility] EMERGENCY DEPARTMENT  11/26/2024     Gustavo Hoff DO  11/27/24 0143

## 2024-11-27 NOTE — H&P
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      Neuro:  # Acute encephalopathy, resolved   Per report, pt presented with AMS characterized by confusion. No recent falls or head trauma. No focal deficits on exam. Ddx includes hypoglycemia (blood glucose of 34 on arrival) vs sepsis vs shock. AMS resolved in ED after treatment with IVF, antibiotics, and D50.   - CTM    # Sedation  Wakens easily to voice. Not on sedation.   - No sedation needed   - RASS goal 0    # Acute on chronic abdominal pain   # Chronic back pain   # Chronic knee pain   Pt takes 5 mg oxycodone q6h PRN at home for chronic abdominal pain (2/2 presumed uterine cancer and abdominal surgeries) and back pain. Presented with worsening abdominal pain likely 2/2 UTI but will want to rule out nephrolithiasis.   - CT CAP pending--rule out nephrolithiasis  - tylenol PRN   - continue PTA oxycodone 5 mg q6h PRN (pt reports she takes 15 mg TID at home, however not listed this way in Med rec--appreciate pharmacy's evaluation of [place] database in AM)   - dilaudid 0.5-1 mg q2h PRN for break through pain   - PTA diclofenac gel for knees     Pulmonary:  # Asthma   No evidence for asthma flare at this time. Breathing comfortably on RA.   - continue PTA albuterol inhaler  PRN    Cardiovascular:  # Septic shock   Presented w/ BP 88/59, hypothermia w/ temp of 35.7 C, and UA c/f UTI. LA wnl. Given 1.5 L of NS in ED w/o much improvement in BP. Started on levophed through pt's port and transferred to ICU.   - repeat lactate on admission   - infectious workup/management per ID section   - continue  ml/hr until 8 AM (will reassess volume status)   - Levophed PRN w/ MAP goal > 65     # Afib   Currently not in RVR, actually bradycardic.   - hold PTA apixaban pending possible urologic procedure    GI/Nutrition:  # Nutrition  # Hx of gastric bypass   - NPO pending CT CAP results     # Cholelithiasis and mild gallbladder distention  Findings seen on CT abd. Pt denies any RUQ pain   - RUQ ordered    Renal/Fluids/Electrolytes:  # MADHU w/ persistent left hydroureteronephrosis    Cr 1.79 on admission, up from baseline ~0.9. CT CAP w/o evidence of nephrolithiasis but presence of persistent moderate left hydroureteronephrosis which extends down to level of the urinary bladder.   - Urology consulted, appreciate recs    - NPO and PTA apixaban held in case of procedure  - IVF PRN   - CTM     # Hypokalemia  # Hypomagnesemia   K 2.9 and Mg 1.6 on ED arrival. Replaced and due for recheck.   - BMP, replace lytes PRN     Endocrine:  # Hypoglycemia   Glucose 39 on arrival to ED. Given D50 x 2 with most recent glucose 113. Likely 2/2 poor oral intake in setting of sepsis.   - hypoglycemia protocol     ID:  # Urosepsis   # Hx of cystotomy s/p suprapubic catheter (7/12/24) w/ resulting recurrent ESBL (Klebsiella) urosepsis  Recent diagnosis of uterine cancer s/p hysterectomy w/ unfortunate complication of iatrogenic cystostomy. Cystostomy repaired 7/12/24 but given the challenge of the repair and damage to bladder from prior pelvic radiation, suprapubic catheter was placed. Since then, pt has had three UTIs (Klebsiella ESBL x 2 and E. Faecium and candida albicans). Now presenting with her fourth UTI. CT w/o  nephrolithiasis but does show diffuse moderate L sided hydroureteronephrosis.   - Urology consulted--will replace suprapubic catheter   - blood culture peripheral pending  - ordered blood culture from port as well   - urine culture pending  - COVID/RSV/influenza pending  - MRSA nares pending   - s/p IV Zosyn x 1. Continue Vanc and Ertapenem 11/27-*    Hematology:    # Serous endometrial adenocarcinoma of uterus s/p SNEHA, BSO (7/12/2024) s/p Cycle 3 carbo/taxel (10/15/24)    Musculoskeletal:  # Deconditioning  - would benefit from PT/OT consult once off pressors     # LE edema, chronic   - consider lymphedema consult in AM     Skin:  # Decubitus ulcer  - WOC consulted     General Cares/Prophylaxis:    DVT Prophylaxis: Pneumatic Compression Devices  GI Prophylaxis: Not indicated  Restraints: None   Family Communication: TBD   Code Status: FULL    Lines/tubes/drains:  - PIV and port     Disposition:  - Medical ICU     Patient seen and findings/plan discussed with medical ICU staff, Dr. Cooper.    Cheyanne Lira, DO  Internal Medicine, PGY-3      Clinically Significant Risk Factors Present on Admission        # Hypokalemia: Lowest K = 2.9 mmol/L in last 2 days, will replace as needed      # Hypomagnesemia: Lowest Mg = 1.6 mg/dL in last 2 days, will replace as needed   # Hypoalbuminemia: Lowest albumin = 1.8 g/dL at 11/26/2024 10:50 PM, will monitor as appropriate  # Drug Induced Coagulation Defect: home medication list includes an anticoagulant medication   # Acute Kidney Injury, unspecified: based on a >150% or 0.3 mg/dL increase in last creatinine compared to past 90 day average, will monitor renal function  # Hypertension: Noted on problem list   # Circulatory Shock: required vasopressors within past 24 hours       # Anemia: based on hgb <11           # Financial/Environmental Concerns:        # Anemia: based on hgb <11             -----------------------------------------------------------------------    HISTORY  PRESENTING ILLNESS:   69 year old female with PMH of gastric bypass, 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      Pt reports she started having lower abdominal pain day of ED arrival. Did have some nausea the day before but denies any emesis or diarrhea. Denies any fevers but does have chills. Endorses some back pain. Denies any chest pain, shortness of breath, blood in stool, cough.     Denies any tobacco, alcohol, or drug use. Lives with her daughter and grandchildren. They will help her with some ADLs. Having some difficulty getting around at home. Using a cane and walker. Has home health nurse who comes to the house to exchange her suprapubic catheter. Last exchanged 2 days ago.     REVIEW OF SYSTEMS: Per HPI    PAST MEDICAL HISTORY:   Past Medical History:   Diagnosis Date    Atrial fibrillation (H)     Depression     Hypertension     Malignant neoplasm of endocervix (H)     Tx with radiation    Near syncope 11/7/2024    Orthopnea 9/21/2024    Other chronic pain     Low back    Schizophrenia (H)     Urinary incontinence      SURGICAL HISTORY:  Past Surgical History:   Procedure Laterality Date    ABDOMINOPLASTY      BIOPSY CERVICAL, LOCAL EXCISION, SINGLE/MULTIPLE  10/26/2011    Procedure:BIOPSY CERVICAL, LOCAL EXCISION, SINGLE/MULTIPLE; EUA, cervical biopsies; Surgeon:BETTY TINEO; Location:[facility] OR    BIOPSY VAGINAL N/A 6/8/2021    Procedure: BIOPSY, VAGINA;  Surgeon: Dany Perez MD;  Location: [facility]    CYSTOSCOPY N/A 5/17/2024    Procedure: Cystoscopy;  Surgeon: Dany Perez MD;   Location: [facility]    CYSTOSTOMY, INSERT TUBE SUPRAPUBIC, COMBINED  7/12/2024    Procedure: Insertion of supra-pubic catheter;  Surgeon: Dany Perez MD;  Location: [facility]    DILATION AND CURETTAGE, WITH ULTRASOUND GUIDANCE N/A 5/17/2024    Procedure: Dilation and curettage of the uterus with drainage of uterine fluid under ultrasound guidance, lysis of vaginal adhesions;  Surgeon: Dany Perez MD;  Location: [facility]    EXAM UNDER ANESTHESIA PELVIC N/A 3/12/2020    Procedure: Exam under anesthsia, vaginal biopsies, possible CO2 laser of the vagina;  Surgeon: Lilliam Roy MD;  Location: [facility]    GI SURGERY  2004    gastric bypass    HYSTERECTOMY TOTAL ABDOMINAL, BILATERAL SALPINGO-OOPHORECTOMY, COMBINED Bilateral 7/12/2024    Procedure: Diagnostic laparoscopy converted to exploratory laparotomy, total abdominal hysterectomy, bilateral salpingo-oophorectomy, lysis of adhesions >60 min;  Surgeon: Dany Perez MD;  Location: [facility]    INSERT PORT VASCULAR ACCESS N/A 8/26/2024    Procedure: Insert port vascular access;  Surgeon: Avery Gregory MD;  Location: [facility] OR    IR CHEST PORT PLACEMENT > 5 YRS OF AGE  8/26/2024    LASER CO2 VAGINA N/A 3/2/2015    Procedure: LASER CO2 VAGINA;  Surgeon: Mariela Abdalla MD;  Location: [facility]    LASER CO2 VAGINA N/A 5/24/2019    Procedure: Exam under anesthesia, vaginal biopsies, CO2 laser of the vagina;  Surgeon: Lilliam Roy MD;  Location: [facility]    LASER CO2 VAGINA N/A 6/8/2021    Procedure: Exam under anesthesia, laser ablation of the upper vagina;  Surgeon: Dany Perez MD;  Location: [facility]    REPAIR BLADDER N/A 7/12/2024    Procedure: Repair bladder;  Surgeon: Dany Perez MD;  Location: [facility]     SOCIAL HISTORY:  Social History     Socioeconomic History    Marital status: Single   Tobacco Use    Smoking status: Never    Smokeless tobacco: Never   Substance and Sexual Activity    Alcohol use: Not Currently    Drug use: No     Social  Drivers of Health     Financial Resource Strain: Low Risk  (9/21/2024)    Financial Resource Strain     Within the past 12 months, have you or your family members you live with been unable to get utilities (heat, electricity) when it was really needed?: No   Food Insecurity: Low Risk  (9/21/2024)    Food Insecurity     Within the past 12 months, did you worry that your food would run out before you got money to buy more?: No     Within the past 12 months, did the food you bought just not last and you didn t have money to get more?: No   Transportation Needs: High Risk (9/21/2024)    Transportation Needs     Within the past 12 months, has lack of transportation kept you from medical appointments, getting your medicines, non-medical meetings or appointments, work, or from getting things that you need?: Yes   Interpersonal Safety: Low Risk  (9/21/2024)    Interpersonal Safety     Do you feel physically and emotionally safe where you currently live?: Yes     Within the past 12 months, have you been hit, slapped, kicked or otherwise physically hurt by someone?: No     Within the past 12 months, have you been humiliated or emotionally abused in other ways by your partner or ex-partner?: No   Housing Stability: Low Risk  (9/21/2024)    Housing Stability     Do you have housing? : Yes     Are you worried about losing your housing?: No     FAMILY HISTORY:   Family History   Problem Relation Age of Onset    Heart Disease Father     Heart Failure Father     No Known Problems Brother     No Known Problems Brother     No Known Problems Brother     Pancreatitis Brother     Hypertension Sister     Peripheral Vascular Disease Sister     No Known Problems Sister     No Known Problems Son     No Known Problems Daughter      ALLERGIES:   Allergies   Allergen Reactions    Ibuprofen Nausea and Vomiting    Shrimp     Sulfa Antibiotics Rash     Patient started on bactrim 7/24/24 tolerating medication without rash on 7/25       MEDICATIONS:  Current Facility-Administered Medications   Medication Dose Route Frequency Provider Last Rate Last Admin    acetaminophen (TYLENOL) tablet 650 mg  650 mg Oral Q4H PRN Ashwin Mathis MD        albuterol (PROVENTIL HFA/VENTOLIN HFA) inhaler  1-2 puff Inhalation Q4H PRN Ashwin Mathis MD        [Held by provider] atenolol (TENORMIN) half-tab 12.5 mg  12.5 mg Oral Daily Ashwin Mathis MD        ertapenem (INVanz) 500 mg in sodium chloride 0.9 % 50 mL intermittent infusion  500 mg Intravenous Q24H Santos Zamorano MD        lactated ringers infusion   Intravenous Continuous Ashwin Mathis MD        norepinephrine (LEVOPHED) 16 mg in  mL infusion MAX CONC CENTRAL LINE  0.01-0.6 mcg/kg/min Intravenous Continuous Forrest Muse Marco, DO 5.3 mL/hr at 11/27/24 0417 0.09 mcg/kg/min at 11/27/24 0417    oxyCODONE (ROXICODONE) tablet 5 mg  5 mg Oral Q6H PRN Ashwin Mathis MD        pharmacy alert - intermittent dosing  1 each Other See Admin Instructions Ashwin Mathis MD        polyethylene glycol (MIRALAX) Packet 17 g  17 g Oral BID PRN Ashwin Mathis MD        senna-docusate (SENOKOT-S/PERICOLACE) 8.6-50 MG per tablet 1 tablet  1 tablet Oral BID PRN Ashwin Mathis MD        Or    senna-docusate (SENOKOT-S/PERICOLACE) 8.6-50 MG per tablet 2 tablet  2 tablet Oral BID PRN Ashwin Mathis MD        vancomycin (VANCOCIN) 1,500 mg in 0.9% NaCl 250 mL intermittent infusion  1,500 mg Intravenous Once Santos Zamorano MD        vancomycin place agarwal - receiving intermittent dosing  1 each Does not apply See Admin Instructions Santos Zamorano MD        zolpidem (AMBIEN) half-tab 2.5 mg  2.5 mg Oral At Bedtime PRN Ashwin Mathis MD         Current Outpatient Medications   Medication Sig Dispense Refill    apixaban ANTICOAGULANT (ELIQUIS) 2.5 MG tablet Take 1 tablet (2.5 mg) by mouth 2 times daily. 60 tablet 3    atenolol (TENORMIN) 25 MG  tablet Take 0.5 tablets (12.5 mg) by mouth daily. 30 tablet 3    oxyCODONE (ROXICODONE) 5 MG tablet Take 1 tablet (5 mg) by mouth every 6 hours as needed for breakthrough pain 30 tablet 0    zolpidem (AMBIEN) 10 MG tablet Take 10 mg by mouth nightly as needed      acetaminophen (TYLENOL) 325 MG tablet Take 2 tablets (650 mg) by mouth every 6 hours as needed for mild pain 24 tablet 0    albuterol (PROAIR HFA/PROVENTIL HFA/VENTOLIN HFA) 108 (90 Base) MCG/ACT inhaler Inhale 1-2 puffs into the lungs every 4 hours as needed for shortness of breath      calcium Citrate-vitamin D 500-400 MG-UNIT CHEW Take 1 tablet by mouth daily      childrens multivitamin w/iron (FLINTSTONES COMPLETE) 60 MG chewable tablet Take 2 tablets by mouth daily      cholecalciferol 125 MCG (5000 UT) CAPS Take 5000 mg 4 times a week      cyanocobalamin (CYANOCOBALAMIN) 1000 MCG/ML injection Inject 1 mL (1,000 mcg) into a muscle every month.      dexAMETHasone (DECADRON) 4 MG tablet Take 2 tablets (8 mg) by mouth daily (with breakfast). Take daily x 3 days following chemo 6 tablet 11    diclofenac (VOLTAREN) 1 % topical gel Apply to: Skin on  Both/All Knee for pain. Topical 1% gel, 4 g applied TOPICALLY to lower extremities 3 times daily PRN ;      ferrous sulfate (CASSANDRA-IN-SOL) 75 (15 FE) MG/ML oral drops Take 15 mg by mouth daily      hydrocortisone 2.5 % cream Apply topically as needed      lidocaine (XYLOCAINE) 5 % external ointment Apply 1 Application topically as needed      naloxone (NARCAN) 4 MG/0.1ML nasal spray Spray 1 spray (4 mg) into one nostril alternating nostrils as needed for opioid reversal every 2-3 minutes until assistance arrives 2 each 0    ondansetron (ZOFRAN) 8 MG tablet Take 1 tablet (8 mg) by mouth every 8 hours as needed for nausea 20 tablet 0    polyethylene glycol (MIRALAX) 17 GM/Dose powder Take 17 g by mouth daily as needed for constipation 510 g 0    prochlorperazine (COMPAZINE) 5 MG tablet Take 1-2 tablets (5-10 mg) by  "mouth every 6 hours as needed for nausea or vomiting 40 tablet 0    senna-docusate (SENOKOT-S/PERICOLACE) 8.6-50 MG tablet Take 1 tablet by mouth 2 times daily 60 tablet 0    Skin Protectants, Misc. (INTERDRY 10\"X36\") SHEE Externally apply 10 each topically every 24 hours. Apply to skin folds on abdomen 10 each 1    triamcinolone (KENALOG) 0.1 % external ointment Apply topically 3 times daily as needed for irritation. 30 g 1     Facility-Administered Medications Ordered in Other Encounters   Medication Dose Route Frequency Provider Last Rate Last Admin    heparin lock flush 100 unit/mL injection 500 Units  500 Units Intracatheter Once Dany Perez MD        sodium chloride (PF) 0.9% PF flush 10 mL  10 mL Intracatheter Once Dany Perez MD           PHYSICAL EXAMINATION:  Temp:  [96.1  F (35.6  C)-96.9  F (36.1  C)] 96.2  F (35.7  C)  Pulse:  [46-75] 55  Resp:  [12-16] 12  BP: ()/(46-81) 78/65  SpO2:  [100 %] 100 %  General: Sitting in bed in no acute distress   HEENT: Atraumatic   Neuro: Awake, alert, interactive   Pulm/Resp: Diminished   CV: Diminished   Abdomen: Soft, non-distended, non tender, suprapubic catheter in place with surrounding dressing   : chronic suprapubic kent catheter in place, urine yellow and clear  Extremities: 1+ pitting edema bilaterally  Incisions/Skin: No rash     LABS: Reviewed.   Arterial Blood Gases   No lab results found in last 7 days.  Complete Blood Count   Recent Labs   Lab 11/26/24 2250   WBC 9.8   HGB 9.6*   *     Basic Metabolic Panel  Recent Labs   Lab 11/27/24  0317 11/27/24  0127 11/26/24  2348 11/26/24 2320 11/26/24 2259 11/26/24 2250   NA  --   --   --   --   --  135   POTASSIUM  --   --   --   --   --  2.9*   CHLORIDE  --   --   --   --   --  100   CO2  --   --   --   --   --  25   BUN  --   --   --   --   --  20.8   CR  --   --   --   --   --  1.79*   * 165* 214* 259*   < > 128*    < > = values in this interval not displayed.     Liver " Function Tests  Recent Labs   Lab 11/26/24  2250   AST 26   ALT 15   ALKPHOS 134   BILITOTAL 0.4   ALBUMIN 1.8*     Coagulation Profile  No lab results found in last 7 days.    IMAGING:  Recent Results (from the past 24 hours)   POC US ECHO LIMITED    Impression    Limited Bedside Cardiac Ultrasound, performed and interpreted by me.   Indication: Hypotension/shock.  parasternal short axis and apical 4 chamber views were acquired.   Image quality was limited    Findings:    No large pericardial effusion    IMPRESSION: No large effusion.      CT Chest Abdomen Pelvis w/o Contrast    Narrative    EXAM: CT CHEST ABDOMEN PELVIS W/O CONTRAST  LOCATION: [facility]  DATE: 11/27/2024    INDICATION: septic shock w u, hx uterine ca, sacral ulcers, urosepsis  COMPARISON: CT abdomen/pelvis with contrast 11/07/2024, CT chest/abdomen/pelvis with contrast 05/03/2024  TECHNIQUE: CT scan of the chest, abdomen, and pelvis was performed without IV contrast. Multiplanar reformats were obtained. Dose reduction techniques were used.   CONTRAST: None.    FINDINGS:   LUNGS AND PLEURA: Left lower lobe calcified granuloma. No new suspicious pulmonary nodule. Dependent atelectasis. No focal pulmonary consolidation otherwise, or significant pleural effusion.    MEDIASTINUM/AXILLAE: Calcified mediastinal and hilar lymph nodes compatible with prior granulomatous disease. Atherosclerotic calcifications of the aortic arch. No significant pericardial effusion.    CORONARY ARTERY CALCIFICATION: Mild.    HEPATOBILIARY: Unremarkable noncontrast CT appearance of the liver. Calcified hepatic granuloma. Cholelithiasis. Mild gallbladder distention.    PANCREAS: Somewhat atrophic, otherwise unremarkable.    SPLEEN: Calcified splenic granulomas.    ADRENAL GLANDS: Normal.    KIDNEYS/BLADDER: Previously noted right hydronephrosis has resolved. Persistent moderate left hydroureteronephrosis which extends down to  level of the urinary bladder. Urinary bladder is decompressed with a suprapubic catheter in place.    BOWEL: Moderate colonic stool. No bowel obstruction. Gastric bypass. Trace pelvic ascites.    LYMPH NODES: Normal.    VASCULATURE: Dense atherosclerotic calcifications of the aortoiliac vessels without evidence of aneurysmal dilatation.    PELVIC ORGANS: Hysterectomy and oophorectomy.    MUSCULOSKELETAL: Anasarca. Right groin lipoma. Multilevel degenerative changes of the cervicothoracic and lumbosacral spine. No acute osseous abnormality or suspicious bony lesion.      Impression    IMPRESSION:  1.  Anasarca.  2.  Previously noted right hydronephrosis has resolved. Persistent moderate left hydroureteronephrosis which extends down to level of the urinary bladder. Urinary bladder is decompressed with a suprapubic catheter in place.  3.  Moderate colonic stool.  4.  Trace pelvic ascites.  5.  Cholelithiasis. Mild gallbladder distention. If cholecystitis is clinically suspected, a right upper quadrant ultrasound could be considered.  6.  No other acute process within the chest, abdomen, or pelvis.

## 2024-11-27 NOTE — PROGRESS NOTES
11/27/24 1027   Appointment Info   Signing Clinician's Name / Credentials (OT) Jacquelin Lowe, OTR/L   Living Environment   People in Home child(vernell), adult   Current Living Arrangements apartment   Home Accessibility stairs to enter home   Number of Stairs, Main Entrance 6   Stair Railings, Main Entrance railings safe and in good condition   Transportation Anticipated family or friend will provide   Living Environment Comments Pt reports living in apartment with her adult kids. Home has a tub/shower with tub bench   Self-Care   Usual Activity Tolerance good   Current Activity Tolerance poor   Equipment Currently Used at Home grab bar, toilet;grab bar, tub/shower;shower chair;walker, rolling;walker, standard;wheelchair, power   Fall history within last six months no   Activity/Exercise/Self-Care Comment Pt reports she nearly always has someone with her when she is mobilizing, uses FWW. IND with UB dressing, requires assist with LB dressing, toileting and bathing. Pt is never home alone.   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   Existing Precautions/Restrictions fall   Limitations/Impairments  safety/cognitive   General Observations and Info Activity: Up ad boo   Cognitive Status Examination   Orientation Status orientation to person, place and time   Cognitive Status Comments Will monitor   Visual Perception   Visual Impairment/Limitations WFL   Sensory   Sensory Comments Lorenzo edema present, endorses n/t   Pain Assessment   Patient Currently in Pain Yes, see Vital Sign flowsheet   Posture   Posture forward head position;protracted shoulders   Range of Motion Comprehensive   General Range of Motion no range of motion deficits identified   Strength Comprehensive (MMT)   Comment, General Manual Muscle Testing (MMT) Assessment Generalized weakness/deconditioning   Coordination   Upper Extremity Coordination No deficits were identified   Bed Mobility   Bed Mobility supine-sit;sit-supine   Supine-Sit Empire (Bed Mobility) minimum assist (75% patient effort);2 person assist   Sit-Supine [place] (Bed Mobility) moderate assist (50% patient effort);2 person assist   Transfers   Transfers sit-stand transfer   Sit-Stand Transfer   Sit-Stand [place] (Transfers) minimum assist (75% patient effort);moderate assist (50% patient effort);2 person assist   Activities of Daily Living   BADL Assessment/Intervention bathing;upper body dressing;lower body dressing;grooming;toileting   Bathing Assessment/Intervention   Emp[place] Level (Bathing) maximum assist (25% patient effort)   Comment, (Bathing) Per clinical judgment   Upper Body Dressing Assessment/Training   Comment, (Upper Body Dressing) Per clinical judgment   Emp[place] Level (Upper Body Dressing) minimum assist (75% patient effort)   Lower Body Dressing Assessment/Training   [place] Level (Lower Body Dressing) maximum assist (25% patient effort)   Grooming Assessment/Training   Emp[place] Level (Grooming) supervision;set up   Comment, (Grooming) Per clinical judgment   Toileting   Emp[place] Level (Toileting) maximum assist (25% patient  effort)   Comment, (Toileting) Per clinical judgment   Clinical Impression   Criteria for Skilled Therapeutic Interventions Met (OT) Yes, treatment indicated   OT Diagnosis Decreased ADL/IADL I   OT Problem List-Impairments impacting ADL problems related to;strength;pain;activity tolerance impaired;cognition;fear & anxiety;mobility   Assessment of Occupational Performance 5 or more Performance Deficits   Identified Performance Deficits Dressing, bathing, toileting, g/h, home mgmt   Planned Therapy Interventions (OT) ADL retraining;IADL retraining;home program guidelines;progressive activity/exercise;risk factor education;cognition;strengthening;transfer training   Clinical Decision Making Complexity (OT) detailed assessment/moderate complexity   Risk & Benefits of therapy have been explained evaluation/treatment results reviewed;care plan/treatment goals reviewed;risks/benefits reviewed;current/potential barriers reviewed;participants voiced agreement with care plan;participants included;patient   Clinical Impression Comments Pt willl benefit from skilled OT services to progress IND w/ ADLs/IADLs and facilitate return to PLOF.   OT Total Evaluation Time   OT Eval, Moderate Complexity Minutes (56186) 10   OT Goals   Therapy Frequency (OT) 5 times/week   OT Predicted Duration/Target Date for Goal Attainment 01/10/25   OT Goals Hygiene/Grooming;Upper Body Dressing;Lower Body Dressing;Upper Body Bathing;Lower Body Bathing;Toilet Transfer/Toileting;Home Management;Cognition   OT: Hygiene/Grooming modified independent   OT: Upper Body Dressing Modified independent   OT: Lower Body Dressing Modified independent;using adaptive equipment   OT: Upper Body Bathing Supervision/stand-by assist   OT: Lower Body Bathing Supervision/stand-by assist   OT: Toilet Transfer/Toileting Supervision/stand-by assist;toilet transfer;cleaning and garment management;using adaptive equipment   OT: Cognitive Patient/caregiver will verbalize  understanding of cognitive assessment results/recommendations as needed for safe discharge planning   Interventions   Interventions Quick Adds Therapeutic Activity   Therapeutic Activities   Therapeutic Activity Minutes (97356) 39   Symptoms noted during/after treatment fatigue;increased pain   Treatment Detail/Skilled Intervention    OT Discharge Planning   OT Plan Progress functional transfers, seated ADLs, standing tolerance   OT Discharge Recommendation (DC Rec) Transitional Care Facility   OT Rationale for DC Rec Pt presents below baseline, limited by weakness, deconditioning and pain. At this time, pt requiring Ax2 for all functional mobility, recommend TCU to progress safety and IND w/ ADLs.   OT Brief overview of current status Ax2 STS, recommend nursing use OH lift at this time   Total Session Time   Timed Code Treatment Minutes 39   Total Session Time (sum of timed and untimed services) 49

## 2024-11-27 NOTE — PHARMACY-ADMISSION MEDICATION HISTORY
"Pharmacist Admission Medication History    Admission medication history is complete. The information provided in this note is only as accurate as the sources available at the time of the update.    Information Source(s): Patient, Hospital records, and CareEverywhere/SureScripts via in-person    Pertinent Information:   - Pt manages own medications but does not appear to be an accurate/reliable historian per interview. Was able to confirm some meds, but relied mostly on Surescripts/Chart Review.  - Medications that had adherent fill history were marked as either \"past week\" or \"past month\".  - Left medications that were >5 months fill history on the list for clinical decision making, but did not amadeo as \"Taking?\".    Changes made to PTA medication list:  Added:   Sertraline 100 mg tab  Deleted:   Diclofenac 1% gel    Allergies reviewed with patient and updates made in EHR: unable to assess    Medication History Completed By: Real Patiño [facility] 11/27/2024 1:58 PM    PTA Med List   Medication Sig Last Dose/Taking    apixaban ANTICOAGULANT (ELIQUIS) 2.5 MG tablet Take 1 tablet (2.5 mg) by mouth 2 times daily. Past Week    atenolol (TENORMIN) 25 MG tablet Take 0.5 tablets (12.5 mg) by mouth daily. Past Week    benztropine (COGENTIN) 1 MG tablet Take 1 mg by mouth 2 times daily. Past Week    calcium Citrate-vitamin D 500-400 MG-UNIT CHEW Take 1 tablet by mouth daily Past Week    childrens multivitamin w/iron (FLINTSTONES COMPLETE) 60 MG chewable tablet Take 2 tablets by mouth daily Past Week    cholecalciferol 125 MCG (5000 UT) CAPS Take 5000 mg 4 times a week Past Week    cyanocobalamin (CYANOCOBALAMIN) 1000 MCG/ML injection Inject 1 mL (1,000 mcg) into a muscle every month. More than a month    dexAMETHasone (DECADRON) 4 MG tablet Take 2 tablets (8 mg) by mouth daily (with breakfast). Take daily x 3 days following chemo More than a month    hydrocortisone 2.5 % cream Apply topically as needed Past Week    ondansetron " (ZOFRAN) 8 MG tablet Take 1 tablet (8 mg) by mouth every 8 hours as needed for nausea More than a month    oxyCODONE (ROXICODONE) 5 MG tablet Take 1 tablet (5 mg) by mouth every 6 hours as needed for breakthrough pain Past Week    senna-docusate (SENOKOT-S/PERICOLACE) 8.6-50 MG tablet Take 1 tablet by mouth 2 times daily Past Month    sertraline (ZOLOFT) 100 MG tablet Take 100 mg by mouth daily. Past Week    tiZANidine (ZANAFLEX) 2 MG tablet Take 2 mg by mouth 3 times daily as needed for muscle spasms. Past Week    triamcinolone (KENALOG) 0.1 % external ointment Apply topically 3 times daily as needed for irritation. Past Week    zolpidem (AMBIEN) 10 MG tablet Take 10 mg by mouth nightly as needed Past Week     Real Patiño, Pharm.D., R.Ph., PGY2 Critical Care Resident Pharmacist

## 2024-11-28 ENCOUNTER — APPOINTMENT (OUTPATIENT)
Dept: GENERAL RADIOLOGY | Facility: CLINIC | Age: 69
DRG: 698 | End: 2024-11-28
Payer: COMMERCIAL

## 2024-11-28 LAB
ANION GAP SERPL CALCULATED.3IONS-SCNC: 9 MMOL/L (ref 7–15)
BACTERIA BLD CULT: NORMAL
BACTERIA BLD CULT: NORMAL
BASE EXCESS BLDV CALC-SCNC: 1.7 MMOL/L (ref -3–3)
BASE EXCESS BLDV CALC-SCNC: 2.2 MMOL/L (ref -3–3)
BASE EXCESS BLDV CALC-SCNC: 2.8 MMOL/L (ref -3–3)
BASE EXCESS BLDV CALC-SCNC: 3.8 MMOL/L (ref -3–3)
BUN SERPL-MCNC: 20.8 MG/DL (ref 8–23)
CALCIUM SERPL-MCNC: 7.8 MG/DL (ref 8.8–10.4)
CHLORIDE SERPL-SCNC: 99 MMOL/L (ref 98–107)
CREAT SERPL-MCNC: 1.75 MG/DL (ref 0.51–0.95)
CYSTATIN C (ROCHE): 1.8 MG/L (ref 0.6–1)
EGFRCR SERPLBLD CKD-EPI 2021: 31 ML/MIN/1.73M2
ERYTHROCYTE [DISTWIDTH] IN BLOOD BY AUTOMATED COUNT: 17.6 % (ref 10–15)
GFR/BSA.PRED SERPLBLD CYS-BASED-ARV: 32 ML/MIN/1.73M2
GLUCOSE BLDC GLUCOMTR-MCNC: 113 MG/DL (ref 70–99)
GLUCOSE BLDC GLUCOMTR-MCNC: 117 MG/DL (ref 70–99)
GLUCOSE BLDC GLUCOMTR-MCNC: 118 MG/DL (ref 70–99)
GLUCOSE BLDC GLUCOMTR-MCNC: 120 MG/DL (ref 70–99)
GLUCOSE BLDC GLUCOMTR-MCNC: 120 MG/DL (ref 70–99)
GLUCOSE BLDC GLUCOMTR-MCNC: 99 MG/DL (ref 70–99)
GLUCOSE SERPL-MCNC: 133 MG/DL (ref 70–99)
HCO3 BLDV-SCNC: 26 MMOL/L (ref 21–28)
HCO3 BLDV-SCNC: 26 MMOL/L (ref 21–28)
HCO3 BLDV-SCNC: 27 MMOL/L (ref 21–28)
HCO3 BLDV-SCNC: 28 MMOL/L (ref 21–28)
HCO3 SERPL-SCNC: 24 MMOL/L (ref 22–29)
HCT VFR BLD AUTO: 28.2 % (ref 35–47)
HGB BLD-MCNC: 9 G/DL (ref 11.7–15.7)
LACTATE SERPL-SCNC: 1 MMOL/L (ref 0.7–2)
LACTATE SERPL-SCNC: 1.4 MMOL/L (ref 0.7–2)
LACTATE SERPL-SCNC: 1.4 MMOL/L (ref 0.7–2)
LACTATE SERPL-SCNC: 2 MMOL/L (ref 0.7–2)
MAGNESIUM SERPL-MCNC: 2 MG/DL (ref 1.7–2.3)
MCH RBC QN AUTO: 30.7 PG (ref 26.5–33)
MCHC RBC AUTO-ENTMCNC: 31.9 G/DL (ref 31.5–36.5)
MCV RBC AUTO: 96 FL (ref 78–100)
O2/TOTAL GAS SETTING VFR VENT: 21 %
OXYHGB MFR BLDV: 31 % (ref 70–75)
OXYHGB MFR BLDV: 57 % (ref 70–75)
OXYHGB MFR BLDV: 64 % (ref 70–75)
OXYHGB MFR BLDV: 70 % (ref 70–75)
PCO2 BLDV: 38 MM HG (ref 40–50)
PCO2 BLDV: 39 MM HG (ref 40–50)
PCO2 BLDV: 40 MM HG (ref 40–50)
PCO2 BLDV: 42 MM HG (ref 40–50)
PH BLDV: 7.43 [PH] (ref 7.32–7.43)
PH BLDV: 7.44 [PH] (ref 7.32–7.43)
PH BLDV: 7.45 [PH] (ref 7.32–7.43)
PH BLDV: 7.45 [PH] (ref 7.32–7.43)
PHOSPHATE SERPL-MCNC: 3.8 MG/DL (ref 2.5–4.5)
PLATELET # BLD AUTO: 120 10E3/UL (ref 150–450)
PO2 BLDV: 24 MM HG (ref 25–47)
PO2 BLDV: 34 MM HG (ref 25–47)
PO2 BLDV: 38 MM HG (ref 25–47)
PO2 BLDV: 41 MM HG (ref 25–47)
POTASSIUM SERPL-SCNC: 4.2 MMOL/L (ref 3.4–5.3)
RBC # BLD AUTO: 2.93 10E6/UL (ref 3.8–5.2)
SAO2 % BLDV: 31.9 % (ref 70–75)
SAO2 % BLDV: 58.2 % (ref 70–75)
SAO2 % BLDV: 65.1 % (ref 70–75)
SAO2 % BLDV: 71.5 % (ref 70–75)
SODIUM SERPL-SCNC: 132 MMOL/L (ref 135–145)
WBC # BLD AUTO: 9.4 10E3/UL (ref 4–11)

## 2024-11-28 PROCEDURE — 272N000278 HC DEVICE 5FR SECURACATH

## 2024-11-28 PROCEDURE — 82805 BLOOD GASES W/O2 SATURATION: CPT

## 2024-11-28 PROCEDURE — 85014 HEMATOCRIT: CPT

## 2024-11-28 PROCEDURE — 99233 SBSQ HOSP IP/OBS HIGH 50: CPT | Performed by: PHYSICIAN ASSISTANT

## 2024-11-28 PROCEDURE — 200N000002 HC R&B ICU UMMC

## 2024-11-28 PROCEDURE — 36569 INSJ PICC 5 YR+ W/O IMAGING: CPT

## 2024-11-28 PROCEDURE — 83735 ASSAY OF MAGNESIUM: CPT

## 2024-11-28 PROCEDURE — 272N000019 HC KIT OPEN ENDED DOUBLE LUMEN

## 2024-11-28 PROCEDURE — 74018 RADEX ABDOMEN 1 VIEW: CPT | Mod: 26 | Performed by: RADIOLOGY

## 2024-11-28 PROCEDURE — 250N000011 HC RX IP 250 OP 636

## 2024-11-28 PROCEDURE — 83605 ASSAY OF LACTIC ACID: CPT

## 2024-11-28 PROCEDURE — 272N000451 HC KIT SHRLOCK 5FR POWER PICC DOUBLE LUMEN

## 2024-11-28 PROCEDURE — 250N000009 HC RX 250

## 2024-11-28 PROCEDURE — 99291 CRITICAL CARE FIRST HOUR: CPT | Mod: GC | Performed by: INTERNAL MEDICINE

## 2024-11-28 PROCEDURE — 250N000011 HC RX IP 250 OP 636: Performed by: INTERNAL MEDICINE

## 2024-11-28 PROCEDURE — 258N000003 HC RX IP 258 OP 636: Performed by: OBSTETRICS & GYNECOLOGY

## 2024-11-28 PROCEDURE — 82610 CYSTATIN C: CPT

## 2024-11-28 PROCEDURE — 999N000065 XR ABDOMEN PORT 1 VIEW

## 2024-11-28 PROCEDURE — 36415 COLL VENOUS BLD VENIPUNCTURE: CPT

## 2024-11-28 PROCEDURE — 84100 ASSAY OF PHOSPHORUS: CPT

## 2024-11-28 PROCEDURE — 80048 BASIC METABOLIC PNL TOTAL CA: CPT

## 2024-11-28 PROCEDURE — 250N000013 HC RX MED GY IP 250 OP 250 PS 637

## 2024-11-28 PROCEDURE — 250N000011 HC RX IP 250 OP 636: Performed by: OBSTETRICS & GYNECOLOGY

## 2024-11-28 RX ORDER — LIDOCAINE HYDROCHLORIDE 20 MG/ML
JELLY TOPICAL ONCE
Status: COMPLETED | OUTPATIENT
Start: 2024-11-28 | End: 2024-11-28

## 2024-11-28 RX ORDER — POLYETHYLENE GLYCOL 3350 17 G/17G
17 POWDER, FOR SOLUTION ORAL DAILY
Status: DISCONTINUED | OUTPATIENT
Start: 2024-11-29 | End: 2024-12-01

## 2024-11-28 RX ORDER — BENZTROPINE MESYLATE 1 MG/1
1 TABLET ORAL DAILY
Status: DISCONTINUED | OUTPATIENT
Start: 2024-11-29 | End: 2024-12-08 | Stop reason: HOSPADM

## 2024-11-28 RX ORDER — MULTIVITAMIN WITH IRON
500 TABLET ORAL DAILY
Status: DISCONTINUED | OUTPATIENT
Start: 2024-11-29 | End: 2024-12-08 | Stop reason: HOSPADM

## 2024-11-28 RX ORDER — LIDOCAINE 40 MG/G
CREAM TOPICAL
Status: ACTIVE | OUTPATIENT
Start: 2024-11-28 | End: 2024-12-01

## 2024-11-28 RX ORDER — ATROPINE SULFATE 1 MG/ML
0.5 INJECTION, SOLUTION INTRAVENOUS ONCE
Status: DISCONTINUED | OUTPATIENT
Start: 2024-11-28 | End: 2024-11-28

## 2024-11-28 RX ORDER — MAGNESIUM SULFATE HEPTAHYDRATE 40 MG/ML
2 INJECTION, SOLUTION INTRAVENOUS ONCE
Status: COMPLETED | OUTPATIENT
Start: 2024-11-28 | End: 2024-11-28

## 2024-11-28 RX ORDER — MULTIVIT WITH IRON,MINERALS
1 TABLET,CHEWABLE ORAL DAILY
Status: DISCONTINUED | OUTPATIENT
Start: 2024-11-29 | End: 2024-12-08 | Stop reason: HOSPADM

## 2024-11-28 RX ORDER — CALCIUM CARBONATE/VITAMIN D3 600 MG-10
1 TABLET ORAL 2 TIMES DAILY WITH MEALS
Status: DISCONTINUED | OUTPATIENT
Start: 2024-11-28 | End: 2024-12-08 | Stop reason: HOSPADM

## 2024-11-28 RX ORDER — ATROPINE SULFATE 0.1 MG/ML
INJECTION INTRAVENOUS
Status: COMPLETED
Start: 2024-11-28 | End: 2024-11-28

## 2024-11-28 RX ORDER — DEXTROSE MONOHYDRATE 100 MG/ML
INJECTION, SOLUTION INTRAVENOUS CONTINUOUS PRN
Status: DISCONTINUED | OUTPATIENT
Start: 2024-11-28 | End: 2024-12-08 | Stop reason: HOSPADM

## 2024-11-28 RX ORDER — ATROPINE SULFATE 0.1 MG/ML
0.5 INJECTION INTRAVENOUS ONCE
Status: COMPLETED | OUTPATIENT
Start: 2024-11-28 | End: 2024-11-28

## 2024-11-28 RX ORDER — METOCLOPRAMIDE HYDROCHLORIDE 5 MG/ML
5 INJECTION INTRAMUSCULAR; INTRAVENOUS ONCE
Status: COMPLETED | OUTPATIENT
Start: 2024-11-28 | End: 2024-11-28

## 2024-11-28 RX ADMIN — HYDROCORTISONE SODIUM SUCCINATE 50 MG: 100 INJECTION, POWDER, FOR SOLUTION INTRAMUSCULAR; INTRAVENOUS at 20:36

## 2024-11-28 RX ADMIN — MAGNESIUM SULFATE HEPTAHYDRATE 2 G: 2 INJECTION, SOLUTION INTRAVENOUS at 05:06

## 2024-11-28 RX ADMIN — ATROPINE SULFATE 0.5 MG: 0.1 INJECTION INTRAVENOUS at 15:30

## 2024-11-28 RX ADMIN — HYDROCORTISONE SODIUM SUCCINATE 50 MG: 100 INJECTION, POWDER, FOR SOLUTION INTRAMUSCULAR; INTRAVENOUS at 12:57

## 2024-11-28 RX ADMIN — LIDOCAINE HYDROCHLORIDE ANHYDROUS 5 ML: 10 INJECTION, SOLUTION INFILTRATION at 14:26

## 2024-11-28 RX ADMIN — ATROPINE SULFATE 0.5 MG: 0.1 INJECTION INTRAVENOUS at 15:18

## 2024-11-28 RX ADMIN — NOREPINEPHRINE BITARTRATE 0.15 MCG/KG/MIN: 0.06 INJECTION, SOLUTION INTRAVENOUS at 01:11

## 2024-11-28 RX ADMIN — LIDOCAINE HYDROCHLORIDE: 20 JELLY TOPICAL at 11:53

## 2024-11-28 RX ADMIN — LIDOCAINE HYDROCHLORIDE ANHYDROUS 5 ML: 10 INJECTION, SOLUTION INFILTRATION at 14:27

## 2024-11-28 RX ADMIN — ERTAPENEM 500 MG: 1 INJECTION INTRAMUSCULAR; INTRAVENOUS at 05:07

## 2024-11-28 RX ADMIN — HYDROCORTISONE SODIUM SUCCINATE 50 MG: 100 INJECTION, POWDER, FOR SOLUTION INTRAMUSCULAR; INTRAVENOUS at 05:07

## 2024-11-28 ASSESSMENT — ACTIVITIES OF DAILY LIVING (ADL)
ADLS_ACUITY_SCORE: 60
ADLS_ACUITY_SCORE: 64
ADLS_ACUITY_SCORE: 60
ADLS_ACUITY_SCORE: 64
ADLS_ACUITY_SCORE: 64
ADLS_ACUITY_SCORE: 60
ADLS_ACUITY_SCORE: 60
ADLS_ACUITY_SCORE: 64
ADLS_ACUITY_SCORE: 64
ADLS_ACUITY_SCORE: 60
ADLS_ACUITY_SCORE: 64

## 2024-11-28 NOTE — PROGRESS NOTES
"Gynecology Oncology Progress Note  November 28, 2024    Ms. Alis Hartman is a 69 year old HD#2 admitted for urosepsis    Dz: Serous endometrial adenocarcinoma s/p SNEHA/BSO (7/12/2024) s/p Cycle 3 carbo/taxel (10/15/24)    24 hour events:   - somnolent overnight requiring narcan x 1  - COWS score 9, improved to 1    Subjective: Patient is very somnolent this morning, will engage by opening eyes and withdrawing hand to gentle stimulation. Unable to answer any questions.    Objective:   BP 99/65   Pulse 55   Temp 97.8  F (36.6  C) (Axillary)   Resp 11   Ht 1.6 m (5' 3\")   Wt 75.6 kg (166 lb 10.7 oz)   SpO2 100%   BMI 29.52 kg/m      General: cachectic-appearing female, in NAD  CV: RRR  Resp: CTAB, no increased work of breathing  Abdomen: soft, non-tender, non-distended  Extremities: nontender, 2+ edema in b/l lower extremities   Lines/Drains: Supra-pubic cath site non-erythematous, non-tender, no drainage    I/Os  (Yesterday // Since Midnight)  IV: 3350 mL // 296 mL  PO: 0 mL // 0 mL  Urine 192 mL // 17 mL  Drains: suprapubic catheter, port, pIV    New labs/imaging-   Latest Reference Range & Units 11/28/24 02:06   Sodium 135 - 145 mmol/L 132 (L)   Potassium 3.4 - 5.3 mmol/L 4.2   Chloride 98 - 107 mmol/L 99   Carbon Dioxide (CO2) 22 - 29 mmol/L 24   Urea Nitrogen 8.0 - 23.0 mg/dL 20.8   Creatinine 0.51 - 0.95 mg/dL 1.75 (H)   GFR Estimate >60 mL/min/1.73m2 31 (L)   Calcium 8.8 - 10.4 mg/dL 7.8 (L)   Anion Gap 7 - 15 mmol/L 9   Magnesium 1.7 - 2.3 mg/dL 2.0   Phosphorus 2.5 - 4.5 mg/dL 3.8   Glucose 70 - 99 mg/dL 133 (H)   Lactic Acid 0.7 - 2.0 mmol/L 1.4   (L): Data is abnormally low  (H): Data is abnormally high     Latest Reference Range & Units 11/28/24 02:06   FIO2  21   Ph Venous 7.32 - 7.43  7.44 (H)   PCO2 Venous 40 - 50 mm Hg 42   PO2 Venous 25 - 47 mm Hg 24 (L)   O2 Sat, Venous 70.0 - 75.0 % 31.9 (L)   Bicarbonate Venous 21 - 28 mmol/L 28   Base Excess Venous -3.0 - 3.0 mmol/L 3.8 (H) "   Oxyhemoglobin Venous 70 - 75 % 31 (L)   (H): Data is abnormally high  (L): Data is abnormally low     Latest Reference Range & Units 11/28/24 02:06   WBC 4.0 - 11.0 10e3/uL 9.4   Hemoglobin 11.7 - 15.7 g/dL 9.0 (L)   Hematocrit 35.0 - 47.0 % 28.2 (L)   Platelet Count 150 - 450 10e3/uL 120 (L)   RBC Count 3.80 - 5.20 10e6/uL 2.93 (L)   MCV 78 - 100 fL 96   MCH 26.5 - 33.0 pg 30.7   MCHC 31.5 - 36.5 g/dL 31.9   RDW 10.0 - 15.0 % 17.6 (H)   (L): Data is abnormally low  (H): Data is abnormally high      Previous labs/imaging:   Latest Reference Range & Units 11/27/24 16:55   Sodium 135 - 145 mmol/L 131 (L)   Potassium 3.4 - 5.3 mmol/L 3.6   Chloride 98 - 107 mmol/L 99   Carbon Dioxide (CO2) 22 - 29 mmol/L 23   Urea Nitrogen 8.0 - 23.0 mg/dL 19.7   Creatinine 0.51 - 0.95 mg/dL 1.63 (H)   GFR Estimate >60 mL/min/1.73m2 33 (L)   Calcium 8.8 - 10.4 mg/dL 7.4 (L)   Anion Gap 7 - 15 mmol/L 9   Glucose 70 - 99 mg/dL 147 (H)   Lactic Acid 0.7 - 2.0 mmol/L 1.4   (L): Data is abnormally low  (H): Data is abnormally high     Latest Reference Range & Units 11/27/24 16:55   FIO2  21   Ph Venous 7.32 - 7.43  7.43   PCO2 Venous 40 - 50 mm Hg 41   PO2 Venous 25 - 47 mm Hg 48 (H)   O2 Sat, Venous 70.0 - 75.0 % 80.8 (H)   Bicarbonate Venous 21 - 28 mmol/L 27   Base Excess Venous -3.0 - 3.0 mmol/L 2.9   Oxyhemoglobin Venous 70 - 75 % 79 (H)   (H): Data is abnormally high      Assessment/Plan:  Alis Hartman is a 69 year old female with 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 Has had waxing and waning mental status throughout admission, though is notably more somnolent at this time.    # Urosepsis  # Septic shock  # Oliguria  # MADHU  # CKD stage 3a  The patient has a history of ESBL  urosepsis and bacteremia. Received zosyn and vancomycin and is now improving on ertapenem, though with worsening oliguria. Likely intrarenal MADHU with elevated Cr as patient has received 3.3L of fluids over the last 24 hours and is up net 5L. Possibly post-renal/obstructive so will plan to flush catheter today to ensure it is flowing well  - Continue Ertapenam per ID recommendations  - IR consult for possible left PNT today or tomorrow  - Suprapubic cath care. S/p exchange by home health nurse on 11/24 or 11/25 per patient, will confirm with daughter today. Plan for bladder scan and catheter flush due to low UOP which is not correlating with her lab work.   - Trend CMP, CBC, mag, phos, lactic acid  - Creatinine elevated at 1.8 with a baseline of 0.8 and a GFR of 30. Will continue to trend  - Avoid nephrotoxic medications   - somnolent this morning, no focal neurologic deficits. Likely multifactorial, will continue to monitor closely.      # Serous endometrial adenocarcinoma of uterus  - S/p SNEHA, BSO (7/12/2024), cystotomy w/ suprapubic catheter, s/p Cycle 3 carbo/taxel (10/15/24). Chemo currently on hold while determining clinical course    #Hypomagnesemia  #Hypokalemia  - On presentation, patient with hypokalemia, mild hypomagnesemia. The patient's calcium is 7.6, but corrected for her albumin it's normal at 9.0.   - ERP  - Regular diet  - Encourage PO intake,   - Monitor VS and UOP     # Atrial fibrillation on eliquis  Patient hasn't taken her eliquis since Saturday, she states that sometimes she has a hard time remembering to take medicine. Will readdress starting lovenox and bridging to eliquis once patient is more stable.     # Vaginal burning, itching, discharge  Patient with vaginal discharge, burning, and itching for 2 weeks now.   - Wet prep negative for yeast, trich, BV, GC/CT.     # Chronic stable conditions  - Anemia of chronic disease: hemoglobin 9.6  - Atrial Fibrillation: hold PTA Eliquis, atenolol in  the setting of sepsis  - Asthma: PTA albuterol   - MDD, Schizophrenia: altered this morning, suspect delirium but will continue to monitor closely  - Hx/o gastric bypass - avoid NSAIDs     Code status: After discussion with patient and daughter, remains full code.     Coral Plaza MD  Ob/Gyn Resident, PGY-4  11/28/2024 7:32 AM

## 2024-11-28 NOTE — PROGRESS NOTES
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 Suspect AMS related to chronotropic incompetence and poor perfusion vs septic shock 2/2 obstructive uropathy.    CHANGES and MAJOR THINGS TODAY:   - somnolent with dilaudid, holding IV narcotics  - Urology consult: possible percutaneous nephrostomy tube for L hydronephrosis   - Trial atropine 1mg at bedside to diagnose chronotropic incompentence  - PICC   - NGT placement and plan trickle feeds    PLAN:    Neuro:  # Multipfactorial sepsis and hypoglycemic encephalopathy (resolved)  # Symptomatic Bradycardia w/ poor perfusion  Per report, pt presented with AMS characterized by confusion. No recent falls or head trauma. No focal deficits on exam. Ddx includes hypoglycemia (blood glucose of 34 on arrival) vs sepsis vs shock. AMS resolved in ED after treatment with IVF, antibiotics, and D50. Possible that symptomatic bradycardia is causing poor perfusions and AMS.  - CTM  - additional workup in Cardiovascular section     # Sedation  Wakens easily to voice. Not on sedation.   - No sedation needed   - RASS goal 0     # Acute on chronic abdominal pain   # Chronic back pain   # Chronic knee pain   Pt takes 5 mg oxycodone q6h PRN at home for chronic abdominal pain (2/2 presumed uterine cancer and abdominal surgeries) and back pain. Presented with  worsening abdominal pain likely 2/2 UTI. No evidence of nephrolithiasis on CT CAP.  - tylenol PRN   - continue PTA oxycodone 5 mg q6h PRN  - holding dilaudid 0.3mg q4h for break through pain   - Consider Pain consult  - PTA diclofenac gel for knees   - hold 200mg TID gabapentin (PTA dose 800mg QID) iso AMS  - additional UTI plan in ID section  - COWS protocol    #Paranoid schizophrenia  PTA Benztropine, sertraline, tizanidine, hold zolpidem  - melatonin qHS     Pulmonary:  # Asthma   No evidence for asthma flare at this time. Breathing comfortably on RA.   - continue PTA albuterol inhaler PRN     Cardiovascular:  # Septic shock vs Symptomatic Chronotropic incompetence  Presented w/ BP 88/59, hypothermia w/ temp of 35.7 C, and UA c/f UTI. LA wnl on arrival but now rising. Given 1.5 L of NS in ED w/o much improvement in BP. On levophed through pt's port. Shock exacerbated by chronotropic incompetence. Likely that symptomatic bradycardia is causing poor perfusion and AMS. Administration of atropine increased HR from 55 to 72, and improved SBP from 70's to 100's. Additionally, SVO2 mildly improved.  - trend lactate  - infectious workup/management per ID section   - Levophed PRN w/ MAP goal > 65   - consider changing levophed to epinephrine in AM  - goal SVO2 > 70, trend VBG and lactate q4h to direct IVF treatment  - 1mg atropine to challenge HR,  with pre-VBG and post-VBG from PICC  - Cardiology consult for chronotropic incompetence  - PICC line placement    # Afib  Currently not in RVR, instead bradycardic. Has PTA atenolol which may be contributing to chronotropic incompetence in setting of sepsis, however medication should have cleared from system by now and bradycardia persists.  - hold PTA apixaban pending possible urologic procedure   - hold PTA atenolol     GI/Nutrition:  # Nutrition  # Hx of gastric bypass   - NPO at midnight for possible PNT on 11/29  - FT trickle feeds  - dietician consult placed  - mitt  restraints to prevent pulling of lines/tubes    # Cholelithiasis and mild gallbladder distention  Findings seen on CT abd. Pt denies any RUQ pain. RUQ US negative for cholecystitis.  - CTM     Renal/Fluids/Electrolytes:  # MADHU w/ persistent left hydronephrosis   # Hx R hydronephrosis (resolved)  # CKD stage 3a  Prior to her most recent cancer surgery the patient was having significant urinary dysfunction likely secondary to history of radiation therapy. Thus at the time of hysterectomy with cystotomy decision was made to place suprapubic catheter due to her poor quality of life preoperatively. Cr 1.79 on admission, up from baseline ~0.9. CT CAP w/o evidence of nephrolithiasis but presence of persistent moderate left hydroureteronephrosis which extends down to level of the urinary bladder. Obstructive uropathy and septic shock contributing to MADHU.  - Urology consulted, appreciate recs                - PTA apixaban held iso sepsis and possible procedure   - NPO at midnight   - assess cardiac status as cause of clinical picture. If no cause determined will re-discuss plans for PNT placement   - daily BMP     # Hypokalemia  # Hypomagnesemia   K 2.9 and Mg 1.6 on ED arrival, continuing to monitor and replete as needed.  - BMP, replace lytes PRN  - Mg replacement protocol  - K replacement protocol  - Phos replacement protocol    Endocrine:  # Hypoglycemia   Glucose 39 on arrival to ED. Given D50 x 2 with most recent glucose 113. Likely 2/2 poor oral intake in setting of sepsis.   - trickle TF  - NPO at midnight for possible PNT  - hypoglycemia protocol      ID:  # C/f Urosepsis   # Hx of cystotomy s/p suprapubic catheter (7/12/24) w/ resulting recurrent ESBL (Klebsiella) urosepsis  Recent diagnosis of uterine cancer s/p hysterectomy w/ unfortunate complication of iatrogenic cystostomy. Cystostomy repaired 7/12/24 but given the challenge of the repair and damage to bladder from prior pelvic radiation, suprapubic catheter  was placed. Since then, pt has had three UTIs (Klebsiella ESBL x 2 and E. Faecium and candida albicans). CT w/o nephrolithiasis but does show diffuse moderate L sided hydroureteronephrosis. Urine culture with urogenital elisa but possible that ureteral stricture preventing infected urine from traveling from kidney to bladder. Definitive diagnosis of urosepsis would require urine cultures from PNT.  - Urology consulted: recommend PNT placement by IR   - possible PNT procedure for source control on 11/29  - blood culture peripheral and from port NGTD after 1 day  - urine culture with urogenital elisa  - suprapubic catheter exchanged by Urology 11/28  - Continue Ertapenem 11/27-**  - hydrocortisone 50 q6h until shock resolves  - if pressor need increases consider vasopressin given afib hx in order to spare levophed    # Vaginal burning, itching, discharge  Patient with vaginal discharge, burning, and itching for 2 weeks now.   - Wet prep negative for yeast, trich, and BV. GC/CT pending. Treat accordingly     Hematology:    # Serous endometrial adenocarcinoma of uterus  - S/p SNEHA, BSO (7/12/2024), cystotomy w/ suprapubic catheter, s/p Cycle 3 carbo/taxel (10/15/24). Chemo currently on hold while determining clinical course.    #Anemia of chronic disease: hemoglobin 9.6, stable     Musculoskeletal:  # Deconditioning  - would benefit from PT/OT consult once off pressors      # LE edema, chronic   - lymphedema consult placed     Skin:  # Decubitus ulcer  - WOC consulted     General Cares/Prophylaxis:    DVT Prophylaxis: Pneumatic Compression Devices  GI Prophylaxis: Not indicated  Restraints: none  Family Communication: family updated by phone  Code Status: Full    Lines/tubes/drains:  - PIV x2 and port   - PICC    Disposition:  - Medical ICU    Patient seen and findings/plan discussed with medical ICU staff, Dr. Carpenter.    Anali Nation MD    Clinically Significant Risk Factors        # Hypokalemia: Lowest K = 2.9  "mmol/L in last 2 days, will replace as needed  # Hyponatremia: Lowest Na = 131 mmol/L in last 2 days, will monitor as appropriate   # Hypocalcemia: Lowest iCa = 3.7 mg/dL in last 2 days, will monitor and replace as appropriate   # Hypomagnesemia: Lowest Mg = 1.6 mg/dL in last 2 days, will replace as needed   # Hypoalbuminemia: Lowest albumin = 1.8 g/dL at 11/27/2024  5:50 AM, will monitor as appropriate     # Thrombocytopenia: Lowest platelets = 120 in last 2 days, will monitor for bleeding  # Acute Kidney Injury, unspecified: based on a >150% or 0.3 mg/dL increase in last creatinine compared to past 90 day average, will monitor renal function  # Hypertension: Noted on problem list            # Overweight: Estimated body mass index is 29.52 kg/m  as calculated from the following:    Height as of this encounter: 1.6 m (5' 3\").    Weight as of this encounter: 75.6 kg (166 lb 10.7 oz)., PRESENT ON ADMISSION  # Severe Malnutrition: based on nutrition assessment, PRESENT ON ADMISSION     # Financial/Environmental Concerns: none                  ====================================  INTERVAL HISTORY:   Patient remains somnolent in AM. Decreased respiratory rates overnight which improved with narcan, and patient received dilaudid at noon the day prior.    OBJECTIVE:   1. VITAL SIGNS:   Temp:  [97.4  F (36.3  C)-98.4  F (36.9  C)] 97.8  F (36.6  C)  Pulse:  [] 57  Resp:  [0-22] 11  BP: ()/(33-84) 89/61  SpO2:  [91 %-100 %] 100 %  Resp: 11  2. INTAKE/ OUTPUT:   I/O last 3 completed shifts:  In: 3350.6 [I.V.:1350.6; IV Piggyback:2000]  Out: 192 [Urine:192]    3. PHYSICAL EXAMINATION:  General: NAD, lying in bed, drowsy, cachectic appearing  HEENT: normocephalic, atraumatic, dry mucous membranes  Neuro: A&Ox3, no formal CN exam completed however no gross abnormalities, moves all extremities  Pulm/Resp: Clear breath sounds bilaterally without rhonchi, crackles or wheeze, breathing non-labored  CV: RRR, no additional " heart sounds  Abdomen: Soft, non-distended, nontender, suprapubic catheter in place  MSK: severe dependent edema in bilateral LE  Incisions/Skin: sacral decubitus ulcer (media tab)    4. LABS:   Arterial Blood Gases   No lab results found in last 7 days.  Complete Blood Count   Recent Labs   Lab 11/28/24  0206 11/27/24  0550 11/26/24  2250   WBC 9.4 11.0 9.8   HGB 9.0* 9.6* 9.6*   * 151 142*     Basic Metabolic Panel  Recent Labs   Lab 11/28/24  0514 11/28/24  0206 11/28/24  0053 11/27/24  2150 11/27/24  2034 11/27/24  1655 11/27/24  0916 11/27/24  0550 11/26/24  2259 11/26/24  2250   NA  --  132*  --   --   --  131*  --  132*  --  135   POTASSIUM  --  4.2  --   --   --  3.6  --  4.3  --  2.9*   CHLORIDE  --  99  --   --   --  99  --  99  --  100   CO2  --  24  --   --   --  23  --  23  --  25   BUN  --  20.8  --   --   --  19.7  --  20.5  --  20.8   CR  --  1.75*  --   --   --  1.63*  --  1.67*  --  1.79*   * 133* 113* 121*   < > 147*   < > 110*   < > 128*    < > = values in this interval not displayed.     Liver Function Tests  Recent Labs   Lab 11/27/24  0550 11/26/24  2250   AST  --  26   ALT 19 15   ALKPHOS 141 134   BILITOTAL 0.4 0.4   ALBUMIN 1.8* 1.8*   INR 1.09  --      Coagulation Profile  Recent Labs   Lab 11/27/24  0550   INR 1.09       5. RADIOLOGY:   Recent Results (from the past 24 hours)   US Abdomen Limited    Narrative    EXAMINATION: Limited Abdominal Ultrasound, 11/27/2024 8:58 AM     COMPARISON: CT 11/27/2024.    HISTORY: cholelithiasis w/ mild gallbladder distention on CT. Please  evaluate the gallbladder and CBD    FINDINGS:   Fluid: No evidence of ascites or pleural effusions.    Liver: The liver demonstrates normal echotexture, measuring 13.7 cm in  craniocaudal dimension. Within the left hepatic lobe is an anechoic  avascular cyst measuring up to 0.8 cm.     Gallbladder: Cholelithiasis and biliary sludge. There is no wall  thickening, pericholecystic fluid, or positive  sonographic Rebollar's  sign.    Bile Ducts: Both the intra- and extrahepatic biliary system are of  normal caliber.  The common bile duct was not visualized.    Pancreas: Not visualized secondary to shadowing bowel gas.    Kidney: Not visualized secondary to shadowing bowel gas.      Impression    IMPRESSION: Exam limited by patient body habitus and shadowing bowel  gas.  1.  Cholelithiasis and biliary sludge without evidence of acute  cholecystitis.  2.  The common bile duct, pancreas, and right kidney were not  visualized.  3.  Simple appearing 0.8 cm left hepatic cyst.    I have personally reviewed the examination and initial interpretation  and I agree with the findings.    LUCHO MARQUEZ MD         SYSTEM ID:  [id number]   Echo Complete   Result Value    LVEF  60-65%    Narrative    [id number]  [id number]  [id number]  [id number]^GLENN^MORGAN     [facility],[place]  Echocardiography Laboratory  [address] [place] [id number]     Name: QUIQUE WILLIS  MRN: [id number]  : 1955  Study Date: 2024 03:00 PM  Age: 69 yrs  Gender: Female  Patient Location: [facility] [place]  Reason For Study: Bradycardia - Sinus  Ordering Physician: MORGAN ELIZABETH  Performed By: Lianna Leblanc RDCS     BSA: 1.8 m2  Height: 63 in  Weight: 160 lb  BP: 86/58 mmHg  ______________________________________________________________________________  Procedure  Echocardiogram with two-dimensional, color and spectral Doppler performed.  Technically difficult study.Extremely poor acoustic windows.  ______________________________________________________________________________  Interpretation Summary  Technically difficult study.Extremely poor acoustic windows.  Global and regional left ventricular function is normal with an EF of 60-65%.  Right ventricular function, chamber size, wall motion, and thickness are  normal.  The inferior vena cava is normal.  No pericardial effusion is  present.  ______________________________________________________________________________  Left Ventricle  Global and regional left ventricular function is normal with an EF of 60-65%.  Left ventricular wall thickness is normal. Left ventricular size is normal.  Left ventricular diastolic function is not assessable. No regional wall motion  abnormalities are seen.     Right Ventricle  Right ventricular function, chamber size, wall motion, and thickness are  normal.     Atria  Both atria appear normal.     Mitral Valve  Mild to moderate mitral annular calcification is present.     Aortic Valve  Aortic valve sclerosis is present. The valve leaflets are not well visualized.  On Doppler interrogation, there is no significant stenosis or regurgitation.     Tricuspid Valve  The tricuspid valve is normal. Pulmonary artery systolic pressure cannot be  assessed.     Pulmonic Valve  The pulmonic valve cannot be assessed.     Vessels  The thoracic aorta cannot be assessed. The pulmonary artery cannot be  assessed. The inferior vena cava is normal.     Pericardium  No pericardial effusion is present.     ______________________________________________________________________________  Report approved by: Erick Juarez 11/27/2024 03:59 PM     ______________________________________________________________________________

## 2024-11-28 NOTE — PLAN OF CARE
ICU End of Shift Summary. See flowsheets for vital signs and detailed assessment.    Changes this shift: Pt remains confused, mainly nonverbal, and intermittently following commands. Equal strength throughout, pupil checks via pupillometer WNL. Labs decreased to BID. Suprapubic flushed per Gyn/Onc, no changes to UO. Catheter replaced by Urology at bedside. Remains oliguric to anuric. NGT placed at bedside, incidental finding in lung no changes to respiratory status; pulled w/ MICU Fellow at bedside. Replaced. TF ordered once verified. PICC placed bedside for labs/atropine trial. Atropine pushed x2, VBG prior and following. Cards consulted to assess for sick sinus syndrome. Requiring Levophed to keep MAP > 65. Daughter updated at bedside by MD. NO pain meds given.    Plan:  Nephrostomy tube placement pending. Wean pressors as able. Start TF when able once NGT is in correct position/verified. (Start thiamine per Oncology)      Goal Outcome Evaluation:    Problem: Risk for Delirium  Goal: Optimal Coping  Outcome: Not Progressing  Intervention: Optimize Psychosocial Adjustment to Delirium  Recent Flowsheet Documentation  Taken 11/28/2024 1600 by Lashay Rodriguez, RN  Supportive Measures: relaxation techniques promoted  Family/Support System Care: self-care encouraged  Taken 11/28/2024 1200 by Lashay Rodriguez, RN  Supportive Measures: relaxation techniques promoted  Taken 11/28/2024 0800 by Lashay Rodriguez, RN  Supportive Measures: relaxation techniques promoted

## 2024-11-28 NOTE — PROGRESS NOTES
CLINICAL NUTRITION SERVICES - BRIEF NOTE     Nutrition Prescription    RECOMMENDATIONS FOR MDs/PROVIDERS TO ORDER:  Please monitor and adjust fluids as needed.     Malnutrition Status:    See RD note from 11/27    Recommendations already ordered by Registered Dietitian (RD):  Trickle feeds, per MD request:   Osmolite 1.5 via OG-tube @ 10 mL/hr x 24 hrs providing 240 mL, 360 kcal (6.3 kcal/kg), 15 g protein (0.26 g/kg), 49 g CHO, 0 g fiber, and 183 mL free water per DW of 57.4 kg.  Water Flushes: 60 mL q 4 hrs (TF + Flushes = 543 mL water; meeting 38% hydration needs).    Future/Additional Recommendations:  Once appropriate to advance tube feedings:   Osmolite 1.5 via OG-tube @ 50 mL/hr x 24 hrs + 1 pkt Prosource TF20 providing 1200 mL, 1880 kcal (33 kcal/kg), 96 g protein (1.7 g/kg), 248 g CHO, 0 g fiber, and 914 mL free water per DW of 57.4 kg.  Water Flushes: 60 mL q 4 hrs (TF + Flushes = 1274 mL water; meeting 89% hydration needs + IV fluids).  - Initiate @ 10 ml/hr and advance by 10 ml q8hr as tolerated  - Do not start or advance until lytes (Mg++,K+) WNL and phos>2.0  - Recommend 60 ml q4hr fluid flushes for tube patency. Additional fluids and/or adjustments per MD.    - Order multivitamin/mineral (15 ml/day via FT) to help ensure micronutrient needs being met with suspected hypermetabolic demands and potential interruptions to TF infusions.  - Elevated HOB with gastric feeds      REASON FOR ASSESSMENT  Alis Hartman is a/an 69 year old female assessed by the dietitian for Provider Order - Registered Dietitian to Assess and Order TF per Medical Nutrition Therapy Protocol    Per Anali Nation MD, requesting trickle feeds only for now.     EVALUATION OF THE PROGRESS TOWARD GOALS   Diet: NPO started at 11/27 @ 0001.     Nutrition Support: None yet, OG in place per LDA avatar.     **Hx of gastric bypass**     NEW FINDINGS   RD last assessed pt on 11/27, please see note for additional information.      Meds:   Scheduled:   - Caltrate  - Flinstones complete  - Vit B12  - Ertapenem   - Insulin Aspart  - Magnesium Sulfate  - Miralax  Continuous:   - Levophed @ 3.6 mL/hr  PRNs Available:   - Dulcolax  - Zofran  - Miralax  - Compazine  - Senokot    Labs:    11/27/24 16:55 11/28/24 02:06   Sodium 131 (L) 132 (L)   Potassium 3.6 4.2   Urea Nitrogen 19.7 20.8   Creatinine 1.63 (H) 1.75 (H)   GFR Estimate 33 (L) 31 (L)   Cystatin C  1.8 (H)   GFR Calculated with Cystatin C  32 (L)   Calcium 7.4 (L) 7.8 (L)   Magnesium  2.0   Phosphorus  3.8   WBC  9.4   Hemoglobin  9.0 (L)   Platelet Count  120 (L)   MCV  96      11/27/24 16:55 11/27/24 20:34 11/27/24 21:50 11/28/24 00:53 11/28/24 02:06 11/28/24 05:14 11/28/24 08:52 11/28/24 13:01   Glucose 147 (H)    133 (H)      Glucose by meter POCT  132 (H) 121 (H) 113 (H)  118 (H) 117 (H) 120 (H)     INTERVENTIONS  Trickle feeds, per MD request:   Osmolite 1.5 via OG-tube @ 10 mL/hr x 24 hrs providing 240 mL, 360 kcal (6.3 kcal/kg), 15 g protein (0.26 g/kg), 49 g CHO, 0 g fiber, and 183 mL free water per DW of 57.4 kg.  Water Flushes: 60 mL q 4 hrs (TF + Flushes = 543 mL water; meeting 38% hydration needs).    Once appropriate to advance tube feedings:   Osmolite 1.5 via OG-tube @ 50 mL/hr x 24 hrs + 1 pkt Prosource TF20 providing 1200 mL, 1880 kcal (33 kcal/kg), 96 g protein (1.7 g/kg), 248 g CHO, 0 g fiber, and 914 mL free water per DW of 57.4 kg.  Water Flushes: 60 mL q 4 hrs (TF + Flushes = 1274 mL water; meeting 89% hydration needs + IV fluids).  - Initiate @ 10 ml/hr and advance by 10 ml q8hr as tolerated  - Do not start or advance until lytes (Mg++,K+) WNL and phos>2.0  - Recommend 60 ml q4hr fluid flushes for tube patency. Additional fluids and/or adjustments per MD.    - Order multivitamin/mineral (15 ml/day via FT) to help ensure micronutrient needs being met with suspected hypermetabolic demands and potential interruptions to TF infusions.  - Elevated HOB with gastric  "feeds     Monitoring/Evaluation  Progress toward goals will be monitored and evaluated per protocol.     Melly Coe RD, LD  Available on [employer] (\"5A Clinical Dietitian\" or \"6A Clinical Dietitian\")     "

## 2024-11-28 NOTE — PROGRESS NOTES
ICU End of Shift Summary. See flowsheets for vital signs and detailed assessment.    Changes this shift: pt somnolent at start of shift. Gyn/onc and MICU at bedside to assess. Narcan IV administered x1 with immediate response. Pt became very agitated requiring bedside sitter. S/Sx of withdrawal present. See flowsheets for COWS assessments. VS and airway maintained throughout episode. Pt now lethargic, unable to follow command or track. MICU aware. VBG obtained. Levo weaned throughout shift. Remains SB 50s. RA with EtCo2 monitoring. NPO. Aneuric with suprapubic cath in place. No BM. Mag replaced per protocol.    Plan: plan for nephrostomy tube placement today in IR. Monitor mental status. Monitor for s/sx of withdrawal. Wean levo gtt as tolerated.

## 2024-11-28 NOTE — PLAN OF CARE
End of Shift Summary (see flowsheet for detailed vital signs and assessments)    Major Shift Events:  -5 mg oxy PO given at 0855 for 8/10 pain to abdomen with no relief  -1mg dilaudid IV given at 1114 for 8/10 pain to abdomen with some relief  -Pt was noticed to be lethargic around 1245 with RASS -1 and not following commands/ opens eyes on command. Vitals stable with ETCO2 monitoring added/VBGs drawn- team made aware, no new changes, monitor patient for worsening s/sx.  -ECHO done today with 60-65% EF.  -Unable to place PICC line in per MICU 1 team/ resume with BP cuff readings.  -Port draws back blood successfully/ blood cultures x2 from port drawn and sent down to lab.  -US abdomen done= L hydronephrosis.   -Regular diet placed- NPO at midnight for potential placement of percutaneous nephrostomy tube for hydronephrosis.  -BS sliding scale initiated d/t steroids increasing BS  -1.5 LR bolus given with recent Lactic 1.4  - Sodium of 141/ MICU 2 team made aware with no new changes  -Potassium of 3.6/ replaced with 20mEq IV pot chloride (second bag pot chloride to be given tonight 11/27)  -2g usha gluc given for ical 3.7/ recheck ical in AM    Neuro: Patient lethargic with RASS -1. Opens eyes to commands, but does not follow commands and mumbles words.   CV: SB-SR 50-60s HR. MAP goal >65. On Levo gtt 0.22  Resp: On RA with no s/sx SOB or resp distress. VBGs taken and stable.  GI: LBM 11/26. Unable to given PO miralax d/t pt's lethargy. Regular diet placed. NPO tonight. Patient did pass bedside swallow study this AM before lethargy onset.  : Suprapubic catheter in place with low UOP ~25ml/hr q2h.  Skin: WOC orders- see flowsheet.  Lines: SL port/2 PIV  Infusions: Levo gtt 0.22      Plan: Care of plan continues. Notify team of changes/concerns.  -Monitor pt's lethargy and notify team of new changes and concerns regarding pt's mentation status  -Continue to wean off levo gtt if able to  -NPO tonight    Care of Plan  Reviewed With: Patient and daughter    Overall Patient Progress: Declining

## 2024-11-28 NOTE — PROGRESS NOTES
[facility] PROGRESS NOTE     Patient:  Alis Hartman   Date of birth 1955, Medical record number [id number]  Date of Visit:  11/28/2024  Date of Admission: 11/26/2024  Consult Requester:Antonio Carpenter MD          Assessment and Plan:   ID Problem List   SIRS shock with concern for Sepsis 11/27  Hx of cystotomy s/p suprapubic catheter (07/12/24) previously treated for UTI colonization of ESBL  MADHU w/ persistent left hydroureteronephrosis (First noted on imaging 5/3/2024)  Hypoglycemia  Acute encephalopathy  Uterine adenocarcinoma  S/p SNEHA with BSO (7/12/2024)  S/p Cycle 3 carbo/taxel (last 10/15/24)  History of gastric bypass    Recommendations:  Continue ertapenem while awaiting infectious work up.   On PNT placement, please send urine (from PNT) for UA w/ UCx reflex.     Assessment:  Aranza is a 69 year old female with a PMH of endometrial adenocarcinoma of uterus s/p SNEHA with BSO (7/12/2024) s/p Cycle 3 with carbo/taxel (10/15/2024), cystotomy s/p suprapubic catheter w/ hx of recurrent ESBL (Klebsiella) infections, A-fib on eliquis, HTN, and anemia of chronic disease who presented to the ED on 11/26 with abdominal pain and confusion. Admitted to ICU for septic shock for which she is requiring pressors.     Hypoglycemic to 34 upon ED arrival which was corrected with improvement in AMS thereafter reportedly f/b worsening somnolence c/f over sedation. Afebrile on presentation with normal WBC of 11, CRP mildly elevated at 13 but normal PCT. Her Cr was elevated to 1.79 and was hypotensive requiring pressors and ICU admission. She been treated for recurrent UTIs klebsiella ESBL x 2 occurances and E. Faecium and candida albicans infections post suprapubic placement, recently hospitalized 11/7 for UTI workup. Blood and port culture NGTD. UA shows negative nitrate, large blood and leukocyte esterase, , , Few bacteria, WBC clumps, and 9 squamous cells present. Urine culture  shows ,000 mixed urogenital elisa.  COVID/Influenza/RSV PCR negative. Vaginal wet prep negative for infection, Chlamydia and Gonorrhea vaginal swab PCR negative. CT scan showed hydronephrosis, mild gallbladder distention, and no abdominal abscess. Ultrasound confirmed no cholecystitis. Suprapubic currently draining no signs of infection around sight. Port flushed, sample drawn, and no signs of infection around sight.     Patient has shock requiring pressor support. Lack of fever, normal WBC, negative procalcitonin, and relatively low CRP are not fitting for severe infection causing septic shock. We would typically see elevated procalcitonin with MADHU- so low procalcitonin in this setting is reassuring. Awaiting PNT placement by IR for upper urinary tract culture (SPC culture with normal elisa).     Thank you for this consult. ID will continue to follow with you.     Delilah Brooks PA-C  Infectious Diseases  Contact via [employer] or [employer] Paging/Directory    Medical Decision Making     50 MINUTES SPENT BY ME on the date of service doing chart review, history, exam, documentation & further activities per the note.           Interim History and Events:     Obtunded. Eyes open but unable to answer questions or interact.     Physical Examination:  Temp: 97.8  F (36.6  C) Temp src: Axillary BP: (!) 89/61 Pulse: 57   Resp: 11 SpO2: 100 % O2 Device: None (Room air)      Vitals:    11/27/24 0500 11/28/24 0000   Weight: 72.6 kg (160 lb 0.9 oz) 75.6 kg (166 lb 10.7 oz)       Constitutional: Female patient seen lying in bed, in NAD. Awake but not interactive.  HEENT: NCAT, MMM.  Respiratory: Non-labored breathing on RA. Lungs CTAB.   Cardiovascular: Regular rate and rhythm.  GI: Normoactive BS. Abdomen is soft, non-distended. SPC in place.  Skin: Warm and dry. No rashes or lesions on exposed surfaces.  Musculoskeletal: Extremities grossly normal. Bilateral LE edema with pitting.  Neurologic: Obtunded, eyes open but not  participating in conversation.  VAD: Port c/d/i      Medications:  Current Facility-Administered Medications   Medication Dose Route Frequency Provider Last Rate Last Admin    [Held by provider] apixaban ANTICOAGULANT (ELIQUIS) tablet 2.5 mg  2.5 mg Oral BID Cheyanne Lira MD        [Held by provider] atenolol (TENORMIN) half-tab 12.5 mg  12.5 mg Oral Daily Ashwin Mathis MD        benztropine (COGENTIN) tablet 1 mg  1 mg Oral Daily Gloria Wen MD   1 mg at 11/27/24 1115    calcium carbonate-vitamin D (CALTRATE) 600-10 MG-MCG per tablet 1 tablet  1 tablet Oral BID w/meals Gloria Wen MD        childrens multivitamin w/iron (FLINTSTONES COMPLETE) chewable tablet 1 tablet  1 tablet Oral Daily Gloria eWn MD        cyanocobalamin (VITAMIN B-12) tablet 500 mcg  500 mcg Oral Daily Gloria Wen MD        ertapenem (INVanz) 500 mg in sodium chloride 0.9 % 50 mL intermittent infusion  500 mg Intravenous Q24H Santos Zamorano  mL/hr at 11/28/24 0507 500 mg at 11/28/24 0507    [Held by provider] gabapentin (NEURONTIN) capsule 200 mg  200 mg Oral TID Antonio Carpenter MD        hydrocortisone sodium succinate PF (solu-CORTEF) injection 50 mg  50 mg Intravenous Q8H Gloria Wen MD   50 mg at 11/28/24 0507    Followed by    [START ON 11/29/2024] hydrocortisone sodium succinate PF (solu-CORTEF) injection 50 mg  50 mg Intravenous Q12H Gloria Wen MD        Followed by    [START ON 11/30/2024] hydrocortisone sodium succinate PF (solu-CORTEF) injection 50 mg  50 mg Intravenous Q12H Gloria Wen MD        Followed by    [START ON 12/2/2024] hydrocortisone sodium succinate PF (solu-CORTEF) injection 50 mg  50 mg Intravenous Q12H Gloria Wen MD        insulin aspart (NovoLOG) injection (RAPID ACTING)  1-3 Units Subcutaneous TID AC Anali Nation MD   1 Units at 11/27/24 1701    insulin aspart (NovoLOG) injection (RAPID ACTING)  1-3 Units Subcutaneous At Bedtime Anali Nation MD        melatonin tablet 3 mg  3 mg  "Oral At Bedtime Anali Nation MD        polyethylene glycol (MIRALAX) Packet 17 g  17 g Oral Daily Anali Nation MD        [Held by provider] QUEtiapine (SEROquel) tablet 400 mg  400 mg Oral BID Gloria Wen MD   400 mg at 11/27/24 1115    [Held by provider] sertraline (ZOLOFT) tablet 150 mg  150 mg Oral Daily Gloria Wen MD   150 mg at 11/27/24 1115       Infusions/Drips:  Current Facility-Administered Medications   Medication Dose Route Frequency Provider Last Rate Last Admin    norepinephrine (LEVOPHED) 16 mg in  mL infusion MAX CONC CENTRAL LINE  0.01-0.6 mcg/kg/min Intravenous Continuous Cheyanne Lira MD 4.7 mL/hr at 11/28/24 0700 0.08 mcg/kg/min at 11/28/24 0700       Laboratory Data:   No results found for: \"ACD4\"    Inflammatory Markers    No lab results found.    Metabolic Studies       Recent Labs   Lab Test 11/28/24  0514 11/28/24  0206 11/28/24  0203 11/28/24  0053 11/27/24  2150 11/27/24  2034 11/27/24  1655 11/27/24  0916 11/27/24  0550 11/26/24  2259 11/26/24  2250 11/26/24  2238 11/10/24  0638 11/09/24  0725 09/21/24  1848 09/21/24  0903   NA  --  132*  --   --   --   --  131*  --  132*  --  135  --  136 136   < > 125*   POTASSIUM  --  4.2  --   --   --   --  3.6  --  4.3  --  2.9*  --  3.9 3.7   < > 3.2*   CHLORIDE  --  99  --   --   --   --  99  --  99  --  100  --  101 100   < > 92*   CO2  --  24  --   --   --   --  23  --  23  --  25  --  28 28   < > 18*   ANIONGAP  --  9  --   --   --   --  9  --  10  --  10  --  7 8   < > 15   BUN  --  20.8  --   --   --   --  19.7  --  20.5  --  20.8  --  9.0 9.5   < > 22.9   CR  --  1.75*  --   --   --   --  1.63*  --  1.67*  --  1.79*  --  0.97* 0.89   < > 2.10*   GFRESTIMATED  --  31*  --   --   --   --  33*  --  32*  --  30*  --  62 69   < > 25*   * 133*  --  113* 121* 132* 147*   < > 110*   < > 128*   < > 81 86   < > 85   A1C  --   --   --   --   --   --   --   --   --   --   --   --   --   --   --  5.0   SAMUEL  --  7.8*  --   --   --   " "--  7.4*  --  7.3*  --  7.2*  --  7.6* 7.5*   < > 8.0*   PHOS  --  3.8  --   --   --   --   --   --  3.5  --   --   --   --  2.7   < >  --    MAG  --  2.0  --   --   --   --   --   --  2.2  --  1.6*  --   --  1.8   < >  --    LACT  --  1.4 1.4  --   --   --  1.4   < > 2.4*  --  1.6  --   --   --    < > 1.1    < > = values in this interval not displayed.       Hepatic Studies    Recent Labs   Lab Test 11/27/24  0550 11/26/24  2250 11/07/24  1005 10/15/24  0845 10/10/24  0954 10/01/24  1730 09/24/24  0314   BILITOTAL 0.4 0.4 0.5 0.3 0.2 0.2 0.2   ALKPHOS 141 134 121 109 101 98 80   ALBUMIN 1.8* 1.8* 2.3* 2.5* 2.4* 2.1* 2.2*   AST  --  26 21 17 13 8 9   ALT 19 15 12 8 6 8 6       Pancreatitis testing    Recent Labs   Lab Test 09/21/24  0903 07/29/24  1648 07/21/24  1542   LIPASE  --  48 53   TRIG 108  --   --        Hematology Studies      Recent Labs   Lab Test 11/28/24  0206 11/27/24  0550 11/26/24  2250 11/10/24  0638 11/09/24  0629 11/08/24  0651   WBC 9.4 11.0 9.8 4.9 5.0 6.5   HGB 9.0* 9.6* 9.6* 7.6* 7.4* 8.5*   HCT 28.2* 29.6* 30.3* 24.4* 23.2* 27.3*   * 151 142* 173 154 186       Arterial Blood Gas Testing    Recent Labs   Lab Test 11/28/24  0206 11/27/24  1655 11/27/24  1218 11/27/24  0551 07/29/24  1709 07/12/24  1647 07/12/24  1444   PH  --   --   --   --   --  7.55* 7.53*   PCO2  --   --   --   --   --  34* 36   PO2  --   --   --   --   --  168* 215*   HCO3  --   --   --   --   --  30* 31*   O2PER 21 21 21 22   < > 33.0 42.0    < > = values in this interval not displayed.        Urine Studies     Recent Labs   Lab Test 11/26/24  2335 11/07/24  1209 10/10/24  1049 09/21/24  0318 07/29/24  1946   URINEPH 6.0 6.5 6.0 8.0* 7.5*   NITRITE Negative Positive* Negative Negative Negative   LEUKEST Large* Large* Large* Moderate* Large*   WBCU >182* >182* >182* >100* 78*       Vancomycin Levels     No lab results found.    Invalid input(s): \"VANCO\"    Tobramycin levels     No lab results found.    Gentamicin " "levels    No lab results found.    CSF testing   No lab results found.      Microbiology:  Culture   Date Value Ref Range Status   11/27/2024 No growth after 12 hours  Preliminary   11/27/2024 No growth after 1 day  Preliminary   11/26/2024 50,000-100,000 CFU/mL Mixture of Urogenital Elisa  Final   11/07/2024 No Growth  Final   11/07/2024 >100,000 CFU/mL Mixture of Urogenital Elisa  Final   10/10/2024 50,000-100,000 CFU/mL Enterococcus faecium (A)  Final   10/10/2024 50,000-100,000 CFU/mL Enterococcus faecium (A)  Final   10/10/2024 <1,000 CFU/mL Urogenital elisa  Final   10/10/2024 <10,000 CFU/mL Candida albicans (A)  Final     Comment:     Susceptibilities not routinely done, refer to antibiogram to view typical susceptibility profiles   09/25/2024 No Growth  Final   09/24/2024 No Growth  Final   09/21/2024 Positive on the 1st day of incubation (A)  Final   09/21/2024 Klebsiella pneumoniae ESBL (AA)  Final     Comment:     2 of 2 bottles   09/21/2024 >100,000 CFU/mL Mixture of Urogenital Elisa  Final   07/29/2024 No Growth  Final   07/29/2024 No Growth  Final   07/21/2024 2+ Bacteroides thetaiotaomicron (A)  Final     Comment:     Susceptibilities not routinely done, refer to antibiogram to view typical susceptibility profiles   07/21/2024 3+ Mixed Aerobic and Anaerobic elisa  Final   07/21/2024 2+ Klebsiella pneumoniae ESBL (A)  Final   07/21/2024 2+ Proteus mirabilis (A)  Final   07/21/2024 2+ Normal elisa  Final   07/21/2024 >100,000 CFU/mL Klebsiella pneumoniae ESBL (A)  Final   07/21/2024 10,000-50,000 CFU/mL Klebsiella pneumoniae (A)  Final   04/11/2024 >100,000 CFU/mL Mixture of Urogenital Elisa  Final   05/23/2023 >100,000 CFU/mL Escherichia coli (A)  Final       Last check of C difficile  No results found for: \"CDBPCT\"    Imaging:  CT Abdomen Pelvis w/o Contrast 11/27  IMPRESSION:  1.  Anasarca.  2.  Previously noted right hydronephrosis has resolved. Persistent moderate left hydroureteronephrosis which " extends down to level of the urinary bladder. Urinary bladder is decompressed with a suprapubic catheter in place.  3.  Moderate colonic stool.  4.  Trace pelvic ascites.  5.  Cholelithiasis. Mild gallbladder distention. If cholecystitis is clinically suspected, a right upper quadrant ultrasound could be considered.  6.  No other acute process within the chest, abdomen, or pelvis.     US Abdomen 11/27  IMPRESSION: Exam limited by patient body habitus and shadowing bowel  gas.  1.  Cholelithiasis and biliary sludge without evidence of acute  cholecystitis.  2.  The common bile duct, pancreas, and right kidney were not  visualized.  3.  Simple appearing 0.8 cm left hepatic cyst.

## 2024-11-28 NOTE — PHARMACY-CONSULT NOTE
Pharmacy Delirium Chart Review    Upon chart review, the following medications may contribute to possible patient delirium:     Hydrocortisone (on taper) - Most reactions resolve after either dose reduction or withdrawal    Please consult unit pharmacist with further questions.    Maria Luz Louise, LaneD

## 2024-11-28 NOTE — PROGRESS NOTES
"Urology  Progress Note    Lethargic and non-responsive to commands overnight, narcan administered x1  Remains on pressor support including norepinephrine   Pain well controlled    Exam  /63   Pulse 61   Temp 98.4  F (36.9  C) (Axillary)   Resp 16   Ht 1.6 m (5' 3\")   Wt 72.6 kg (160 lb 0.9 oz)   SpO2 100%   BMI 28.35 kg/m    No acute distress  Unlabored breathing  Abdomen soft,  appropriately tender, nondistended.   Suprapubic tube draining minimal clear yellow urine      /--    Labs  Recent Labs   Lab Test 11/27/24  1655 11/27/24  0550 11/26/24  2250 11/10/24  0638   WBC  --  11.0 9.8 4.9   HGB  --  9.6* 9.6* 7.6*   CR 1.63* 1.67* 1.79* 0.97*         AM labs pending    Assessment/Plan  69 year old female with PMH of gastric bypass, serous endometrial adenocarcinoma of uterus s/p SNEHA, BSO (7/12/2024) s/p Cycle 3 carbo/taxel (10/15/24), cystotomy s/p suprapubic catheter w/ hx of recurrent ESBL (Klebsiella) urosepsis & bacteremia, A-fib on eliquis, HTN, and anemia of chronic disease, she is admitted for management of septic shock. Labs on admission notable for no leukocytosis, downtrending CRP, and MADHU with elevated creatinine of 1.8 from recent baseline 0.8-1.0. Considered PNT placement however IR with multiple concerns given no clear etiology for hypotension recommending further workup.     - Per discussion with primary team plan to assess cardiac status as cause of clinical picture. If no cause determined will re-discuss plans for PNT placement.   - Primary team requested suprapubic tube be exchanged as this was not recently exchanged, urology to exchange SPT today vs tomorrow     Seen and examined with the chief resident. Will discuss with staff surgeon, Dr. Fritz Linda MD, PGY2  Urology Resident    Contacting the urology team: Please see [employer] and page on-call clinician with any questions or concerns regarding this patient. Note writer may be unavailable.     To access [employer] from " "intranet: under \"Applications\" --> \"Business Applications\" select \"swabr\" and search \"Urology Adult & Pediatric/[facility].\" Please note that any question about a urology inpatient, [place] or [place], should go to job code [id number].      "

## 2024-11-28 NOTE — PROGRESS NOTES
Brief progress note    S: presented to bedside for pm rounds. Patient was minimally arousable with sternal rub, but would immediately close her eyes and was not responsive to her name, or shaking her shoulder. The patient had some lethargy throughout the day, but per nursing and her primary team, this was the most severe.    O:  Patient Vitals for the past 24 hrs:   BP Temp Temp src Pulse Resp SpO2 Height Weight   11/27/24 2115 (!) 84/61 -- -- 61 11 100 % -- --   11/27/24 2100 104/84 -- -- 69 21 100 % -- --   11/27/24 2045 102/69 -- -- 84 19 100 % -- --   11/27/24 2030 99/77 -- -- 113 13 100 % -- --   11/27/24 2015 93/57 -- -- 56 (!) 8 100 % -- --   11/27/24 2000 92/58 98.4  F (36.9  C) Axillary 56 (!) 9 100 % -- --   11/27/24 1945 95/59 -- -- 55 (!) 8 100 % -- --   11/27/24 1930 92/69 -- -- 57 12 100 % -- --   11/27/24 1915 103/65 -- -- 54 13 100 % -- --   11/27/24 1900 93/58 -- -- 56 11 100 % -- --   11/27/24 1845 93/57 -- -- 57 11 100 % -- --   11/27/24 1830 90/55 -- -- 55 14 100 % -- --   11/27/24 1815 (!) 89/58 -- -- 56 17 100 % -- --   11/27/24 1800 (!) 81/53 -- -- 56 17 100 % -- --   11/27/24 1745 (!) 79/53 -- -- 57 13 100 % -- --   11/27/24 1730 (!) 83/54 -- -- 57 11 100 % -- --   11/27/24 1715 90/55 -- -- 60 12 100 % -- --   11/27/24 1701 -- -- -- 61 -- 100 % -- --   11/27/24 1700 (!) 76/51 -- -- 63 16 100 % -- --     Gen: patient lying in bed, unarousable to sound, minimally arousable to painful stimulation  CV: regular rate, rhythm  Pulm: no increased work of breathing    Blood glucose: 132  Levophed: 0.22    Given the patient's lethargy, and though she had not had any narcotics in >9 hours, she was given a dose of narcan which reversed her lethargy. The patient was disoriented after receiving the narcan, but responded to commands.    A/P:    Concern for oversedation, though the patient hadn't had any narcotics in >9 hours at the time of the narcan administration. Will defer to primary team to monitor  sedation levels.    The patient's BP continue to be low, despite increased levophed titration. Continue to titrate to an appropriate MAP per primary team.    Ob/gyn will continue to follow    Ashwin Mathis MD  Gynecologic-Oncology, PGY-2   11/27/2024 11:45 PM    Gyn-Onc pager: [phone]

## 2024-11-28 NOTE — PROGRESS NOTES
Brief Urology Progress Note    The author of this note may not be the person on call, and may be scrubbed in the OR or post-call; to avoid unnecessary delays in patient care, please use AMCOM to find correct person to page     Requested by Gyn-Onc team to exchange SPT due to low output. Bladder scan minimal and catheter easily flushes so low concern that catheter is obstructed and resulting in low output.    Procedure Note:  Existing SPT removed. The patient was prepped and draped in the usual sterile fashion. A well lubricated 16 French catheter was placed without difficulty into the bladder. There was no immediate return of urine. The catheter was easily flushed with 60 ml normal saline with return of cloudy yellow colored urine confirming placement within the bladder. 10 mL sterile water was placed in the balloon and the catheter was secured to the thigh. The patient tolerated the procedure well.     - See progress note for plan of care     Ella Linda MD  Urology Resident PGY-2

## 2024-11-28 NOTE — PROCEDURES
[facility]    Double Lumen PICC Placement    Date/Time: 11/28/2024 2:44 PM    Performed by: Bandar Hill RN  Authorized by: Antonio Carpenter MD  Indications: vascular access      UNIVERSAL PROTOCOL   Site Marked: Yes  Prior Images Obtained and Reviewed:  Yes  Required items: Required blood products, implants, devices and special equipment available    Patient identity confirmed:  Arm band, provided demographic data and hospital-assigned identification number  NA - No sedation, light sedation, or local anesthesia  Confirmation Checklist:  Patient's identity using two indicators and relevant allergies  Time out: Immediately prior to the procedure a time out was called    Universal Protocol: the Joint Commission Universal Protocol was followed    Preparation: Patient was prepped and draped in usual sterile fashion       ANESTHESIA    Anesthesia:  Local infiltration  Local Anesthetic:  Lidocaine 1% without epinephrine  Anesthetic Total (mL):  5      SEDATION    Patient Sedated: No        Preparation: skin prepped with ChloraPrep  Skin prep agent: skin prep agent completely dried prior to procedure  Sterile barriers: maximum sterile barriers were used: cap, mask, sterile gown, sterile gloves, and large sterile sheet  Hand hygiene: hand hygiene performed prior to central venous catheter insertion  Type of line used: PICC  Catheter type: double lumen  Lumen type: non-valved and power PICC  Lumen Identification: Purple and Red  Catheter size: 5 Fr  Brand: Bard  Lot number: [id number]  Placement method: venipuncture, MST, ultrasound and tip navigation system  Number of attempts: 1  Difficulty threading catheter: no  Successful placement: yes  Orientation: left  Catheter to Vein (%): 44  Location: basilic vein  Tip Location: SVC  Arm circumference: adults 10 cm  Extremity circumference: 29  Visible catheter length: 2  Total catheter length: 44  Dressing and  securement: alcohol impregnated caps, chlorhexidine disc applied, blood removed, gloves changed prior to final dressing, glue, dressing applied, securement device, site cleansed, subcutaneous anchor securement system and transparent securement dressing  Post procedure assessment: blood return through all ports, placement verified by 3CG technology and free fluid flow  PROCEDURE   Patient Tolerance:  Patient tolerated the procedure well with no immediate complicationsDescribe Procedure: Picc ok to use  Disposal: sharps and needle count correct at the end of procedure, needles and guidewire disposed in sharps container       Media Information    Document Information    Other: Photograph   PICC 3CG tracing   11/28/2024 2:37 PM   Attached To:   Hospital Encounter on 11/26/24   Source Information    HillBandar Wells RN  [facility]   Document History

## 2024-11-29 ENCOUNTER — APPOINTMENT (OUTPATIENT)
Dept: INTERVENTIONAL RADIOLOGY/VASCULAR | Facility: CLINIC | Age: 69
End: 2024-11-29
Payer: COMMERCIAL

## 2024-11-29 ENCOUNTER — APPOINTMENT (OUTPATIENT)
Dept: PHYSICAL THERAPY | Facility: CLINIC | Age: 69
DRG: 698 | End: 2024-11-29
Payer: COMMERCIAL

## 2024-11-29 ENCOUNTER — APPOINTMENT (OUTPATIENT)
Dept: ULTRASOUND IMAGING | Facility: CLINIC | Age: 69
End: 2024-11-29
Payer: COMMERCIAL

## 2024-11-29 ENCOUNTER — APPOINTMENT (OUTPATIENT)
Dept: OCCUPATIONAL THERAPY | Facility: CLINIC | Age: 69
DRG: 698 | End: 2024-11-29
Payer: COMMERCIAL

## 2024-11-29 VITALS
TEMPERATURE: 97.4 F | OXYGEN SATURATION: 100 % | HEIGHT: 63 IN | DIASTOLIC BLOOD PRESSURE: 63 MMHG | WEIGHT: 167.55 LBS | HEART RATE: 59 BPM | SYSTOLIC BLOOD PRESSURE: 92 MMHG | RESPIRATION RATE: 15 BRPM | BODY MASS INDEX: 29.69 KG/M2

## 2024-11-29 LAB
ALBUMIN UR-MCNC: 200 MG/DL
ANION GAP SERPL CALCULATED.3IONS-SCNC: 7 MMOL/L (ref 7–15)
APPEARANCE UR: ABNORMAL
BASE EXCESS BLDV CALC-SCNC: 1.5 MMOL/L (ref -3–3)
BASE EXCESS BLDV CALC-SCNC: 2.6 MMOL/L (ref -3–3)
BILIRUB UR QL STRIP: NEGATIVE
BUN SERPL-MCNC: 26.8 MG/DL (ref 8–23)
CALCIUM SERPL-MCNC: 7.8 MG/DL (ref 8.8–10.4)
CHLORIDE SERPL-SCNC: 101 MMOL/L (ref 98–107)
COLOR UR AUTO: ABNORMAL
CREAT SERPL-MCNC: 2.01 MG/DL (ref 0.51–0.95)
EGFRCR SERPLBLD CKD-EPI 2021: 26 ML/MIN/1.73M2
ERYTHROCYTE [DISTWIDTH] IN BLOOD BY AUTOMATED COUNT: 17.6 % (ref 10–15)
GLUCOSE BLDC GLUCOMTR-MCNC: 101 MG/DL (ref 70–99)
GLUCOSE BLDC GLUCOMTR-MCNC: 112 MG/DL (ref 70–99)
GLUCOSE BLDC GLUCOMTR-MCNC: 114 MG/DL (ref 70–99)
GLUCOSE BLDC GLUCOMTR-MCNC: 121 MG/DL (ref 70–99)
GLUCOSE BLDC GLUCOMTR-MCNC: 91 MG/DL (ref 70–99)
GLUCOSE BLDC GLUCOMTR-MCNC: 96 MG/DL (ref 70–99)
GLUCOSE SERPL-MCNC: 123 MG/DL (ref 70–99)
GLUCOSE UR STRIP-MCNC: 30 MG/DL
HCO3 BLDV-SCNC: 26 MMOL/L (ref 21–28)
HCO3 BLDV-SCNC: 27 MMOL/L (ref 21–28)
HCO3 SERPL-SCNC: 25 MMOL/L (ref 22–29)
HCT VFR BLD AUTO: 25.6 % (ref 35–47)
HGB BLD-MCNC: 8.3 G/DL (ref 11.7–15.7)
HGB UR QL STRIP: ABNORMAL
KETONES UR STRIP-MCNC: NEGATIVE MG/DL
LACTATE SERPL-SCNC: 0.8 MMOL/L (ref 0.7–2)
LACTATE SERPL-SCNC: 0.8 MMOL/L (ref 0.7–2)
LEUKOCYTE ESTERASE UR QL STRIP: ABNORMAL
MAGNESIUM SERPL-MCNC: 2.4 MG/DL (ref 1.7–2.3)
MCH RBC QN AUTO: 30.7 PG (ref 26.5–33)
MCHC RBC AUTO-ENTMCNC: 32.4 G/DL (ref 31.5–36.5)
MCV RBC AUTO: 95 FL (ref 78–100)
NITRATE UR QL: NEGATIVE
O2/TOTAL GAS SETTING VFR VENT: 21 %
O2/TOTAL GAS SETTING VFR VENT: 21 %
OXYHGB MFR BLDV: 54 % (ref 70–75)
OXYHGB MFR BLDV: 60 % (ref 70–75)
PCO2 BLDV: 40 MM HG (ref 40–50)
PCO2 BLDV: 41 MM HG (ref 40–50)
PH BLDV: 7.41 [PH] (ref 7.32–7.43)
PH BLDV: 7.44 [PH] (ref 7.32–7.43)
PH UR STRIP: 6 [PH] (ref 5–7)
PHOSPHATE SERPL-MCNC: 4.3 MG/DL (ref 2.5–4.5)
PLATELET # BLD AUTO: 102 10E3/UL (ref 150–450)
PO2 BLDV: 34 MM HG (ref 25–47)
PO2 BLDV: 36 MM HG (ref 25–47)
POTASSIUM SERPL-SCNC: 4.1 MMOL/L (ref 3.4–5.3)
RBC # BLD AUTO: 2.7 10E6/UL (ref 3.8–5.2)
RBC URINE: >182 /HPF
SAO2 % BLDV: 55.4 % (ref 70–75)
SAO2 % BLDV: 61.7 % (ref 70–75)
SODIUM SERPL-SCNC: 133 MMOL/L (ref 135–145)
SP GR UR STRIP: 1.03 (ref 1–1.03)
UROBILINOGEN UR STRIP-MCNC: 2 MG/DL
WBC # BLD AUTO: 7.8 10E3/UL (ref 4–11)
WBC URINE: >182 /HPF

## 2024-11-29 PROCEDURE — 0T9130Z DRAINAGE OF LEFT KIDNEY WITH DRAINAGE DEVICE, PERCUTANEOUS APPROACH: ICD-10-PCS | Performed by: RADIOLOGY

## 2024-11-29 PROCEDURE — 83735 ASSAY OF MAGNESIUM: CPT

## 2024-11-29 PROCEDURE — 50432 PLMT NEPHROSTOMY CATHETER: CPT

## 2024-11-29 PROCEDURE — 83605 ASSAY OF LACTIC ACID: CPT

## 2024-11-29 PROCEDURE — 99152 MOD SED SAME PHYS/QHP 5/>YRS: CPT | Performed by: RADIOLOGY

## 2024-11-29 PROCEDURE — 81003 URINALYSIS AUTO W/O SCOPE: CPT

## 2024-11-29 PROCEDURE — 85014 HEMATOCRIT: CPT

## 2024-11-29 PROCEDURE — C1729 CATH, DRAINAGE: HCPCS

## 2024-11-29 PROCEDURE — 99291 CRITICAL CARE FIRST HOUR: CPT | Mod: GC | Performed by: INTERNAL MEDICINE

## 2024-11-29 PROCEDURE — 250N000011 HC RX IP 250 OP 636

## 2024-11-29 PROCEDURE — 82374 ASSAY BLOOD CARBON DIOXIDE: CPT

## 2024-11-29 PROCEDURE — 93970 EXTREMITY STUDY: CPT

## 2024-11-29 PROCEDURE — 250N000013 HC RX MED GY IP 250 OP 250 PS 637

## 2024-11-29 PROCEDURE — 97530 THERAPEUTIC ACTIVITIES: CPT | Mod: GO

## 2024-11-29 PROCEDURE — 97530 THERAPEUTIC ACTIVITIES: CPT | Mod: GP

## 2024-11-29 PROCEDURE — 258N000003 HC RX IP 258 OP 636: Performed by: OBSTETRICS & GYNECOLOGY

## 2024-11-29 PROCEDURE — 84100 ASSAY OF PHOSPHORUS: CPT

## 2024-11-29 PROCEDURE — C1769 GUIDE WIRE: HCPCS

## 2024-11-29 PROCEDURE — 272N000508 HC NEEDLE CR8

## 2024-11-29 PROCEDURE — 50432 PLMT NEPHROSTOMY CATHETER: CPT | Mod: LT | Performed by: RADIOLOGY

## 2024-11-29 PROCEDURE — 80048 BASIC METABOLIC PNL TOTAL CA: CPT

## 2024-11-29 PROCEDURE — 93970 EXTREMITY STUDY: CPT | Mod: 26 | Performed by: RADIOLOGY

## 2024-11-29 PROCEDURE — 255N000002 HC RX 255 OP 636

## 2024-11-29 PROCEDURE — 87086 URINE CULTURE/COLONY COUNT: CPT

## 2024-11-29 PROCEDURE — 82805 BLOOD GASES W/O2 SATURATION: CPT

## 2024-11-29 PROCEDURE — 250N000011 HC RX IP 250 OP 636: Performed by: OBSTETRICS & GYNECOLOGY

## 2024-11-29 PROCEDURE — 99254 IP/OBS CNSLTJ NEW/EST MOD 60: CPT | Mod: GC | Performed by: INTERNAL MEDICINE

## 2024-11-29 PROCEDURE — 200N000002 HC R&B ICU UMMC

## 2024-11-29 PROCEDURE — 250N000013 HC RX MED GY IP 250 OP 250 PS 637: Performed by: INTERNAL MEDICINE

## 2024-11-29 PROCEDURE — 99152 MOD SED SAME PHYS/QHP 5/>YRS: CPT

## 2024-11-29 PROCEDURE — 258N000003 HC RX IP 258 OP 636

## 2024-11-29 RX ORDER — NALOXONE HYDROCHLORIDE 0.4 MG/ML
0.4 INJECTION, SOLUTION INTRAMUSCULAR; INTRAVENOUS; SUBCUTANEOUS
Status: DISCONTINUED | OUTPATIENT
Start: 2024-11-29 | End: 2024-11-29

## 2024-11-29 RX ORDER — NALOXONE HYDROCHLORIDE 0.4 MG/ML
0.2 INJECTION, SOLUTION INTRAMUSCULAR; INTRAVENOUS; SUBCUTANEOUS
Status: DISCONTINUED | OUTPATIENT
Start: 2024-11-29 | End: 2024-11-29

## 2024-11-29 RX ORDER — ACETAMINOPHEN 325 MG/1
650 TABLET ORAL EVERY 6 HOURS
Status: DISCONTINUED | OUTPATIENT
Start: 2024-11-29 | End: 2024-12-08 | Stop reason: HOSPADM

## 2024-11-29 RX ORDER — FLUMAZENIL 0.1 MG/ML
0.2 INJECTION, SOLUTION INTRAVENOUS
Status: DISCONTINUED | OUTPATIENT
Start: 2024-11-29 | End: 2024-11-29

## 2024-11-29 RX ORDER — THIAMINE HYDROCHLORIDE 100 MG/ML
500 INJECTION, SOLUTION INTRAMUSCULAR; INTRAVENOUS 3 TIMES DAILY
Status: COMPLETED | OUTPATIENT
Start: 2024-11-29 | End: 2024-11-30

## 2024-11-29 RX ORDER — LIDOCAINE 40 MG/G
CREAM TOPICAL
Status: DISCONTINUED | OUTPATIENT
Start: 2024-11-29 | End: 2024-11-29

## 2024-11-29 RX ORDER — AMPICILLIN 1 G/1
1 INJECTION, POWDER, FOR SOLUTION INTRAMUSCULAR; INTRAVENOUS
Status: DISCONTINUED | OUTPATIENT
Start: 2024-11-29 | End: 2024-11-29

## 2024-11-29 RX ORDER — THIAMINE HYDROCHLORIDE 100 MG/ML
250 INJECTION, SOLUTION INTRAMUSCULAR; INTRAVENOUS DAILY
Status: COMPLETED | OUTPATIENT
Start: 2024-12-01 | End: 2024-12-05

## 2024-11-29 RX ORDER — FENTANYL CITRATE 50 UG/ML
25-50 INJECTION, SOLUTION INTRAMUSCULAR; INTRAVENOUS EVERY 5 MIN PRN
Status: DISCONTINUED | OUTPATIENT
Start: 2024-11-29 | End: 2024-11-29

## 2024-11-29 RX ORDER — IODIXANOL 320 MG/ML
50 INJECTION, SOLUTION INTRAVASCULAR ONCE
Status: COMPLETED | OUTPATIENT
Start: 2024-11-29 | End: 2024-11-29

## 2024-11-29 RX ADMIN — Medication 3 MG: at 21:39

## 2024-11-29 RX ADMIN — Medication 1 TABLET: at 07:39

## 2024-11-29 RX ADMIN — IODIXANOL 10 ML: 320 INJECTION, SOLUTION INTRAVASCULAR at 14:04

## 2024-11-29 RX ADMIN — CYANOCOBALAMIN TAB 500 MCG 500 MCG: 500 TAB at 07:39

## 2024-11-29 RX ADMIN — ACETAMINOPHEN 650 MG: 325 TABLET, FILM COATED ORAL at 15:39

## 2024-11-29 RX ADMIN — BENZTROPINE MESYLATE 1 MG: 0.5 TABLET ORAL at 07:40

## 2024-11-29 RX ADMIN — FENTANYL CITRATE 25 MCG: 50 INJECTION, SOLUTION INTRAMUSCULAR; INTRAVENOUS at 13:37

## 2024-11-29 RX ADMIN — ERTAPENEM 500 MG: 1 INJECTION INTRAMUSCULAR; INTRAVENOUS at 05:04

## 2024-11-29 RX ADMIN — FENTANYL CITRATE 12.5 MCG: 50 INJECTION, SOLUTION INTRAMUSCULAR; INTRAVENOUS at 13:55

## 2024-11-29 RX ADMIN — THIAMINE HYDROCHLORIDE 500 MG: 100 INJECTION, SOLUTION INTRAMUSCULAR; INTRAVENOUS at 14:55

## 2024-11-29 RX ADMIN — HYDROCORTISONE SODIUM SUCCINATE 50 MG: 100 INJECTION, POWDER, FOR SOLUTION INTRAMUSCULAR; INTRAVENOUS at 05:04

## 2024-11-29 RX ADMIN — POLYETHYLENE GLYCOL 3350 17 G: 17 POWDER, FOR SOLUTION ORAL at 07:40

## 2024-11-29 RX ADMIN — CALCIUM CARBONATE 600 MG (1,500 MG)-VITAMIN D3 400 UNIT TABLET 1 TABLET: at 18:27

## 2024-11-29 RX ADMIN — HYDROCORTISONE SODIUM SUCCINATE 50 MG: 100 INJECTION, POWDER, FOR SOLUTION INTRAMUSCULAR; INTRAVENOUS at 15:38

## 2024-11-29 RX ADMIN — THIAMINE HYDROCHLORIDE 500 MG: 100 INJECTION, SOLUTION INTRAMUSCULAR; INTRAVENOUS at 21:39

## 2024-11-29 RX ADMIN — CALCIUM CARBONATE 600 MG (1,500 MG)-VITAMIN D3 400 UNIT TABLET 1 TABLET: at 07:40

## 2024-11-29 RX ADMIN — SODIUM CHLORIDE, POTASSIUM CHLORIDE, SODIUM LACTATE AND CALCIUM CHLORIDE 1000 ML: 600; 310; 30; 20 INJECTION, SOLUTION INTRAVENOUS at 10:55

## 2024-11-29 RX ADMIN — MIDAZOLAM 0.25 MG: 1 INJECTION INTRAMUSCULAR; INTRAVENOUS at 13:54

## 2024-11-29 RX ADMIN — APIXABAN 2.5 MG: 2.5 TABLET, FILM COATED ORAL at 21:39

## 2024-11-29 RX ADMIN — ACETAMINOPHEN 650 MG: 325 TABLET, FILM COATED ORAL at 21:39

## 2024-11-29 RX ADMIN — THIAMINE HYDROCHLORIDE 500 MG: 100 INJECTION, SOLUTION INTRAMUSCULAR; INTRAVENOUS at 10:55

## 2024-11-29 RX ADMIN — MIDAZOLAM 0.5 MG: 1 INJECTION INTRAMUSCULAR; INTRAVENOUS at 13:36

## 2024-11-29 ASSESSMENT — ACTIVITIES OF DAILY LIVING (ADL)
ADLS_ACUITY_SCORE: 68
ADLS_ACUITY_SCORE: 64
ADLS_ACUITY_SCORE: 68
ADLS_ACUITY_SCORE: 68
ADLS_ACUITY_SCORE: 64
ADLS_ACUITY_SCORE: 68
ADLS_ACUITY_SCORE: 64
ADLS_ACUITY_SCORE: 68
ADLS_ACUITY_SCORE: 64
ADLS_ACUITY_SCORE: 62
ADLS_ACUITY_SCORE: 64
ADLS_ACUITY_SCORE: 64
ADLS_ACUITY_SCORE: 68
ADLS_ACUITY_SCORE: 64
ADLS_ACUITY_SCORE: 68
ADLS_ACUITY_SCORE: 68
ADLS_ACUITY_SCORE: 64
ADLS_ACUITY_SCORE: 68

## 2024-11-29 NOTE — PROGRESS NOTES
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 Suspect AMS related to chronotropic incompetence and poor perfusion vs septic shock 2/2 obstructive uropathy.    CHANGES and MAJOR THINGS TODAY:   - somnolent with dilaudid, holding IV narcotics  - Urology consult: possible percutaneous nephrostomy tube for L hydronephrosis   - NGT placement and plan trickle feeds    PLAN:    Neuro:  # Multipfactorial sepsis and hypoglycemic encephalopathy (resolved)  # Symptomatic Bradycardia w/ poor perfusion  Per report, pt presented with AMS characterized by confusion. No recent falls or head trauma. No focal deficits on exam. Ddx includes hypoglycemia (blood glucose of 34 on arrival) vs sepsis vs shock. AMS resolved in ED after treatment with IVF, antibiotics, and D50. Possible that symptomatic bradycardia is causing poor perfusions and AMS.  - start IV thiamine iso malnutrition and gastric bypass  - additional workup in Cardiovascular section     # Sedation  Wakens easily to voice. Not on sedation.   - No sedation needed   - RASS goal 0     # Acute on chronic abdominal pain   # Chronic back pain   # Chronic knee pain   Pt takes 5 mg oxycodone q6h PRN at home for chronic abdominal pain (2/2 presumed uterine cancer and abdominal surgeries) and back pain. Presented with worsening abdominal pain likely 2/2  UTI. No evidence of nephrolithiasis on CT CAP.  - tylenol 650mg q6h   - hold PTA oxycodone 5 mg q6h PRN  - holding dilaudid 0.3mg q4h for break through pain   - Consider Pain consult  - PTA diclofenac gel for knees   - hold 200mg TID gabapentin (PTA dose 800mg QID) iso AMS  - additional UTI plan in ID section  - COWS protocol    #Paranoid schizophrenia  PTA Benztropine, sertraline, tizanidine, hold zolpidem  - melatonin qHS     Pulmonary:  # Asthma   No evidence for asthma flare at this time. Breathing comfortably on RA.   - continue PTA albuterol inhaler PRN     Cardiovascular:  # Septic shock vs Symptomatic Chronotropic incompetence  Presented w/ BP 88/59, hypothermia w/ temp of 35.7 C, and UA c/f UTI. LA wnl on arrival but now rising. Given 1.5 L of NS in ED w/o much improvement in BP. On levophed through pt's port. Shock exacerbated by chronotropic incompetence. Likely that symptomatic bradycardia is causing poor perfusion and AMS. Administration of atropine increased HR from 55 to 72, and improved SBP from 70's to 100's. Additionally, SVO2 mildly improved.  - trend lactate  - infectious workup/management per ID section   - Levophed PRN w/ MAP goal > 65   - Cardiology EP consult for chronotropic incompetence, appreciate recs  - s/p L nephrostomy tube for possible source control and obstructive nephropathy    # Afib  Currently not in RVR, instead bradycardic. Has PTA atenolol which may be contributing to chronotropic incompetence in setting of sepsis, however medication should have cleared from system by now and bradycardia persists.  - restart PTA apixaban 2.5mg BID  - hold PTA atenolol     GI/Nutrition:  # Nutrition  # Hx of Kiko en Y gastric bypass   - FT 10ml/hr  - dietician consult  - mitt restraints to prevent pulling of lines/tubes    # Cholelithiasis and mild gallbladder distention  Findings seen on CT abd. Pt denies any RUQ pain. RUQ US negative for cholecystitis.  - CTM     Renal/Fluids/Electrolytes:  #  MADHU w/ persistent left hydronephrosis   # Hx R hydronephrosis (resolved)  # CKD stage 3a  Prior to her most recent cancer surgery the patient was having significant urinary dysfunction likely secondary to history of radiation therapy. Thus at the time of hysterectomy with cystotomy decision was made to place suprapubic catheter due to her poor quality of life preoperatively. Cr 1.79 on admission, up from baseline ~0.9. CT CAP w/o evidence of nephrolithiasis but presence of persistent moderate left hydroureteronephrosis which extends down to level of the urinary bladder. Obstructive uropathy and possible septic shock contributing to MADHU.  - Urology consulted, appreciate recs    - NPO at midnight   - assess cardiac status as cause of clinical picture. If no cause determined will re-discuss plans for PNT placement   - daily BMP  - 1L LR  - s/p L nephrostomy tube for possible source control and obstructive nephropathy   - follow UA from neph tube     # Hypokalemia  # Hypomagnesemia   K 2.9 and Mg 1.6 on ED arrival, continuing to monitor and replete as needed.  - BMP, replace lytes PRN  - Mg replacement protocol  - K replacement protocol  - Phos replacement protocol    Endocrine:  # Hypoglycemia   Glucose 39 on arrival to ED. Given D50 x 2 with most recent glucose 113. Likely 2/2 poor oral intake in setting of sepsis.   - alyssa BABB  - hypoglycemia protocol      ID:  # C/f Urosepsis   # Hx of cystotomy s/p suprapubic catheter (7/12/24) w/ resulting recurrent ESBL (Klebsiella) urosepsis  Recent diagnosis of uterine cancer s/p hysterectomy w/ unfortunate complication of iatrogenic cystostomy. Cystostomy repaired 7/12/24 but given the challenge of the repair and damage to bladder from prior pelvic radiation, suprapubic catheter was placed. Since then, pt has had three UTIs (Klebsiella ESBL x 2 and E. Faecium and candida albicans). CT w/o nephrolithiasis but does show diffuse moderate L sided hydroureteronephrosis. Urine  culture with urogenital elisa but possible that ureteral stricture preventing infected urine from traveling from kidney to bladder. Definitive diagnosis of urosepsis would require urine cultures from PNT.  - Urology consulted: recommend PNT placement by IR   - s/p L nephrostomy tube for possible source control and obstructive nephropathy    - follow UA from nephrostomy tube  - blood culture peripheral and from port NGTD  - urine culture with urogenital elisa  - suprapubic catheter exchanged by Urology 11/28  - Continue Ertapenem 11/27-**  - hydrocortisone 50 q6h until shock resolves  - if pressor need increases consider vasopressin given afib hx in order to spare levophed    # Vaginal burning, itching, discharge  Patient with vaginal discharge, burning, and itching for 2 weeks now.   - Wet prep negative for yeast, trich, and BV. GC/CT pending. Treat accordingly     Hematology:    # Serous endometrial adenocarcinoma of uterus  - S/p SNEHA, BSO (7/12/2024), cystotomy w/ suprapubic catheter, s/p Cycle 3 carbo/taxel (10/15/24). Chemo currently on hold while determining clinical course.    #Anemia of chronic disease: hemoglobin 9.6, stable     Musculoskeletal:  # Deconditioning  - would benefit from PT/OT consult once off pressors      # LE edema, chronic   - lymphedema consult placed     Skin:  # Decubitus ulcer  - WOC consulted     General Cares/Prophylaxis:    DVT Prophylaxis: Pneumatic Compression Devices  GI Prophylaxis: Not indicated  Restraints: mitts  Family Communication: daughter updated at bedside 11/28  Code Status: Full    Lines/tubes/drains:  - PIV x2 and port   - PICC    Disposition:  - Medical ICU    Patient seen and findings/plan discussed with medical ICU staff, Dr. Carpenter.    Anali Nation MD    Clinically Significant Risk Factors         # Hyponatremia: Lowest Na = 131 mmol/L in last 2 days, will monitor as appropriate       # Hypoalbuminemia: Lowest albumin = 1.8 g/dL at 11/27/2024  5:50 AM, will  "monitor as appropriate     # Thrombocytopenia: Lowest platelets = 102 in last 2 days, will monitor for bleeding  # Acute Kidney Injury, unspecified: based on a >150% or 0.3 mg/dL increase in last creatinine compared to past 90 day average, will monitor renal function  # Hypertension: Noted on problem list            # Overweight: Estimated body mass index is 29.68 kg/m  as calculated from the following:    Height as of this encounter: 1.6 m (5' 3\").    Weight as of this encounter: 76 kg (167 lb 8.8 oz)., PRESENT ON ADMISSION  # Severe Malnutrition: based on nutrition assessment, PRESENT ON ADMISSION     # Financial/Environmental Concerns: none                  ====================================  INTERVAL HISTORY:   Patient is more interactive today, responding to questions, still confused. Falls asleep easily. PNT tube placed by IR and patient comfortable in bed after placement.     OBJECTIVE:   1. VITAL SIGNS:   Temp:  [97.3  F (36.3  C)-98.1  F (36.7  C)] 98.1  F (36.7  C)  Pulse:  [55-90] 57  Resp:  [10-16] 10  BP: ()/() 100/57  SpO2:  [97 %-100 %] 100 %  Resp: 10  2. INTAKE/ OUTPUT:   I/O last 3 completed shifts:  In: 490.48 [I.V.:420.48; NG/GT:30]  Out: 165 [Urine:165]    3. PHYSICAL EXAMINATION:  General: NAD, lying in bed, arousable, cachectic appearing  HEENT: normocephalic, atraumatic, dry mucous membranes  Neuro: A&Ox3, no formal CN exam completed however no gross abnormalities, moves all extremities  Pulm/Resp: breathing non-labored on RA  CV: RRR, no additional heart sounds  Abdomen: Soft, non-distended, nontender, suprapubic catheter in place  MSK: severe dependent edema in bilateral LE  Incisions/Skin: small sacral decubitus ulcer (media tab)    4. LABS:   Arterial Blood Gases   No lab results found in last 7 days.  Complete Blood Count   Recent Labs   Lab 11/29/24  0446 11/28/24  0206 11/27/24  0550 11/26/24  2250   WBC 7.8 9.4 11.0 9.8   HGB 8.3* 9.0* 9.6* 9.6*   * 120* 151 142* "     Basic Metabolic Panel  Recent Labs   Lab 11/29/24  0502 11/29/24  0446 11/29/24  0007 11/28/24  2108 11/28/24  0514 11/28/24  0206 11/27/24  2034 11/27/24  1655 11/27/24  0916 11/27/24  0550   NA  --  133*  --   --   --  132*  --  131*  --  132*   POTASSIUM  --  4.1  --   --   --  4.2  --  3.6  --  4.3   CHLORIDE  --  101  --   --   --  99  --  99  --  99   CO2  --  25  --   --   --  24  --  23  --  23   BUN  --  26.8*  --   --   --  20.8  --  19.7  --  20.5   CR  --  2.01*  --   --   --  1.75*  --  1.63*  --  1.67*   * 123* 101* 99   < > 133*   < > 147*   < > 110*    < > = values in this interval not displayed.     Liver Function Tests  Recent Labs   Lab 11/27/24  0550 11/26/24  2250   AST  --  26   ALT 19 15   ALKPHOS 141 134   BILITOTAL 0.4 0.4   ALBUMIN 1.8* 1.8*   INR 1.09  --      Coagulation Profile  Recent Labs   Lab 11/27/24  0550   INR 1.09       5. RADIOLOGY:   Recent Results (from the past 24 hours)   XR Abdomen Port 1 View    Narrative    EXAMINATION: XR ABDOMEN PORT 1 VIEW, 11/28/2024 12:24 PM    COMPARISON: 11/27/2024    HISTORY: Placement of NGT    FINDINGS: NG tube tip is in the left main bronchus. No definite  pneumothorax. No air-filled dilated loops of bowel. Right IJ catheter  tip in the mid SVC.      Impression    IMPRESSION: NG tube in the left mainstem bronchus. This is  subsequently replaced on subsequent films.     LUCHO MARQUEZ MD         SYSTEM ID:  [id number]   XR Abdomen Port 1 View    Narrative    EXAMINATION: XR ABDOMEN PORT 1 VIEW, 11/28/2024 4:17 PM    COMPARISON: Earlier same day    HISTORY: Placement of NGT    FINDINGS: Nasogastric tube is been repositioned and overlies the upper  abdomen. No air-filled dilated loops of bowel. No pneumothorax.      Impression    IMPRESSION: Nasogastric tube overlies the midline upper abdomen.   Changes of gastric bypass.    LUCHO MARQUEZ MD         SYSTEM ID:  [id number]   XR Abdomen Port 1 View    Narrative    EXAMINATION: XR  ABDOMEN PORT 1 VIEW, 11/28/2024 5:40 PM    COMPARISON: Linear same day    HISTORY: NG placement    FINDINGS: The gastric tube is been further advanced and is now in the  right upper quadrant, presumably within proximal jejunum.  Nonobstructive bowel gas pattern. Lung bases are clear.      Impression    IMPRESSION: NG tube further advanced within the right upper quadrant  with tip likely in jejunum.    LUCHO MARQUEZ MD         SYSTEM ID:  [id number]   US Upper Extremity Venous Duplex Bilat    Impression    RESIDENT PRELIMINARY INTERPRETATION  IMPRESSION:  1.  No evidence of deep venous thrombosis in either upper extremity.  2.  Bilateral subcutaneous edema.

## 2024-11-29 NOTE — PRE-PROCEDURE
GENERAL PRE-PROCEDURE:   Procedure:  Left nephrostomy tube placeement    Patient states understanding of procedure being performed: Yes    Patient's understanding of procedure matches consent: Yes    Procedure consent matches procedure scheduled: Yes    Expected level of sedation:  Moderate  Appropriately NPO:  Yes  ASA Class:  2  Mallampati  :  Grade 3- soft palate visible, posterior pharyngeal wall not visible  Lungs:  Lungs clear with good breath sounds bilaterally  Heart:  Normal heart sounds and rate  History & Physical reviewed:  History and physical reviewed and no updates needed  Statement of review:  I have reviewed the lab findings, diagnostic data, medications, and the plan for sedation

## 2024-11-29 NOTE — CONSULTS
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      The patient was referred to us for evaluation of chronic bradycardia while on pressors.    She presented to the emergency department on 11/26 with abdominal pain and confusion. She was diagnosed with septic shock secondary to urosepsis, requiring vasopressor support.    Chart review shows that the patient was initiated on norepinephrine at 0.23 mcg/kg/min, with maximum heart rates in the upper 80s. Her vasopressor requirements have now significantly decreased, and from an infectious standpoint, she appears to be improving.    A transthoracic echocardiogram performed during this admission revealed preserved left ventricular function, with an ejection fraction of 60-65%. Electrocardiogram showed normal sinus rhythm with a heart rate in the 50s.    Her medical history is also notable for paroxysmal atrial fibrillation, for which she is on apixaban for stroke prophylaxis (WAYNE?DS?-VASc score of at least 3) and atenolol 12.5 mg daily, both of which were discontinued on admission.    We evaluated the patient in the ICU, where she appeared confused and unable to provide a coherent history. Telemetry review showed her heart rate has consistently remained above 60 on low-dose norepinephrine. We will need to reassess once patient is out of the ICU for possible PPM implant    Concern for chronotropic incompetence  -At this time, the patient  "is not a candidate for a temporary pacemaker  -Will consider PPM implant once patient is out of the ICU and has been clear of from infectious process by infectious disease team    Atrial fibrillation  -Currently in normal sinus rhythm  -Restart anticoagulation when safe per primary team  -Stop beta-blockers     Patient was discussed and examined by Dr. Albarran.    Jorge Reyes Castro, MD, MS  Cardiovascular disease fellow  [facility]    I very much appreciated the opportunity to  assess Alis Hartman in the hospital with CVEP Fellow Dr Reyes. The note above summarizes my findings and current recommendations.  Please do not hesitate to contact my office if you have any questions or concerns.      Mian Albarran MD  Cardiac Arrhythmia Service  [facility]  [phone]     Clinically Significant Risk Factors         # Hyponatremia: Lowest Na = 131 mmol/L in last 2 days, will monitor as appropriate       # Hypoalbuminemia: Lowest albumin = 1.8 g/dL at 11/27/2024  5:50 AM, will monitor as appropriate   # Thrombocytopenia: Lowest platelets = 102 in last 2 days, will monitor for bleeding  # Acute Kidney Injury, unspecified: based on a >150% or 0.3 mg/dL increase in last creatinine compared to past 90 day average, will monitor renal function  # Hypertension: Noted on problem list            # Overweight: Estimated body mass index is 29.68 kg/m  as calculated from the following:    Height as of this encounter: 1.6 m (5' 3\").    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      She was referred to us for evaluation of chronic incompetence while being on pressors.    Patient presented to the emergency department for evaluation of abdominal pain and confusion on 11/26.  She was found to have septic shock secondary to urosepsis requiring pressors.    Per chart review, patient was initiated on norepinephrine at  0.23 mcg/kg/min with max heart rates in the upper 80s.    We evaluated the patient in the ICU, where she appeared confused and unable to provide a coherent history.     Past Medical History    Past Medical History:   Diagnosis Date    Atrial fibrillation (H)     Depression     Hypertension     Malignant neoplasm of endocervix (H)     Tx with radiation    Near syncope 11/7/2024    Orthopnea 9/21/2024    Other chronic pain     Low back    Schizophrenia (H)     Urinary incontinence        Past Surgical History   Past Surgical History:   Procedure Laterality Date    ABDOMINOPLASTY      BIOPSY CERVICAL, LOCAL EXCISION, SINGLE/MULTIPLE  10/26/2011    Procedure:BIOPSY CERVICAL, LOCAL EXCISION, SINGLE/MULTIPLE; EUA, cervical biopsies; Surgeon:BETTY TINEO; Location:[facility] OR    BIOPSY VAGINAL N/A 06/08/2021    Procedure: BIOPSY, VAGINA;  Surgeon: Dany Perez MD;  Location: [facility] OR    CYSTOSCOPY N/A 05/17/2024    Procedure: Cystoscopy;  Surgeon: Dany Perez MD;  [facility]    CYSTOSTOMY, INSERT TUBE SUPRAPUBIC, COMBINED  07/12/2024    Procedure: Insertion of supra-pubic catheter;  Surgeon: Dany Perez MD;  [facility]    DILATION AND CURETTAGE, WITH ULTRASOUND GUIDANCE N/A 05/17/2024    Procedure: Dilation and curettage of  the uterus with drainage of uterine fluid under ultrasound guidance, lysis of vaginal adhesions;  Surgeon: Dany Perez MD;  [facility]    EXAM UNDER ANESTHESIA PELVIC N/A 03/12/2020    Procedure: Exam under anesthsia, vaginal biopsies, possible CO2 laser of the vagina;  Surgeon: Lilliam Roy MD;  [facility]    GI SURGERY  2004    gastric bypass    HYSTERECTOMY TOTAL ABDOMINAL, BILATERAL SALPINGO-OOPHORECTOMY, COMBINED Bilateral 07/12/2024    Procedure: Diagnostic laparoscopy converted to exploratory laparotomy, total abdominal hysterectomy, bilateral salpingo-oophorectomy, lysis of adhesions >60 min;  Surgeon: Dany Perez MD;  [facility]    INSERT PORT VASCULAR ACCESS N/A 08/26/2024    Procedure: Insert port vascular access;  Surgeon: Avery Gregory MD;  [facility]    IR CHEST PORT PLACEMENT > 5 YRS OF AGE  08/26/2024    LASER CO2 VAGINA N/A 03/02/2015    Procedure: LASER CO2 VAGINA;  Surgeon: Mariela Abdalla MD;  Location: [facility]    LASER CO2 VAGINA N/A 05/24/2019    Procedure: Exam under anesthesia, vaginal biopsies, CO2 laser of the vagina;  Surgeon: Lilliam Roy MD;  Location: [facility]    LASER CO2 VAGINA N/A 06/08/2021    Procedure: Exam under anesthesia, laser ablation of the upper vagina;  Surgeon: Dany Perez MD;  Location: [facility]    PICC DOUBLE LUMEN PLACEMENT Left 11/28/2024    left basilic 5 fr dl power picc 44 cm    REPAIR BLADDER N/A 07/12/2024    Procedure: Repair bladder;  Surgeon: Dany Perez MD;  [facility]: [facility]       Medications   I have reviewed this patient's current medications      Review of Systems    The 10 point Review of Systems is negative other than noted in the HPI or here.      Physical Exam   Vital Signs: Temp: 98.2  F (36.8  C) Temp src: Axillary BP: (!) 84/56 Pulse: 59   Resp: 13 SpO2: 100 % O2 Device: None (Room air)    Weight: 167 lbs 8.79 oz    Constitutional: thin, appears to be ill, confused  Hematologic /  Lymphatic: no supraclavicular lymphadenopathy  Respiratory: No increased work of breathing, good air exchange, clear to auscultation bilaterally, no crackles or wheezing  Cardiovascular: Normal apical impulse, regular rate and rhythm. No JVD. No peripheral leg swelling  GI: No scars, normal bowel sounds, soft, non-distended, non-tender.  Skin: warm to touch. No signs of excoriations in extremities  Musculoskeletal: There is no redness, warmth, or swelling of the joints.  Full range of motion noted.  Neurologic: AOX3  Cranial nerves II-XII are grossly intact.         Medical Decision Making       Please see A&P for additional details of medical decision making.      Data   ------------------------- PAST 24 HR DATA REVIEWED -----------------------------------------------    I have personally reviewed the following data over the past 24 hrs:    7.8  \   8.3 (L)   / 102 (L)     133 (L) 101 26.8 (H) /  91   4.1 25 2.01 (H) \     Procal: N/A CRP: N/A Lactic Acid: 0.8         Imaging results reviewed over the past 24 hrs:   Recent Results (from the past 24 hours)   US Upper Extremity Venous Duplex Bilat    Narrative    EXAMINATION: DOPPLER VENOUS ULTRASOUND OF BILATERAL UPPER EXTREMITIES,  11/29/2024 2:20 AM     COMPARISON: None.    HISTORY: LUE edema; evaluate for DVT    TECHNIQUE:  Gray-scale evaluation with compression, spectral flow, and  color Doppler assessment of the deep venous system of both upper  extremities.    FINDINGS:  Normal blood flow and waveforms are demonstrated in the bilateral  internal jugular, innominate, subclavian, and right axillary veins.  There is normal compressibility of the brachial, basilic and cephalic  veins bilaterally.    Left basilic PICC line in place, appropriately visualized within the  left axillary vein. Technically challenging to demonstrate color  Doppler flow within the left axillary vein, though flow is present in  spectral Doppler.    Bilateral subcutaneous edema.       Impression    IMPRESSION:  1.  No evidence of deep venous thrombosis in either upper extremity.  2.  Bilateral subcutaneous edema.    I have personally reviewed the examination and initial interpretation  and I agree with the findings.    HOWIE ROBLEDO MD         SYSTEM ID:  [id number]   IR Nephrostomy Tube Placement Left    Narrative    PROCEDURES 11/29/2024 2:01 PM:  1. Left percutaneous nephrostomy tube placement.    CLINICAL HISTORY: hydronephrosis. Suspected urosepsis.    COMPARISONS: CT chest abdomen and pelvis 11/27/2024    ATTENDING RADIOLOGIST: BROWN Cortez MD    I, GAETANO CORTEZ MD, attest that I was present in the procedure room for  the entire procedure.    MEDICATIONS: The patient was placed on continuous monitoring. Vital  signs and sedation were monitored by the Interventional Radiology  nurse under the Attending Physician's supervision. 0.75 mg Versed and  37.5 mcg Fentanyl IV. The patient remained stable throughout the  procedure.    ATTENDING FACE-TO-FACE SEDATION TIME: 10 minutes    FLUOROSCOPY TIME: 1.1 minutes    DOSE: 9 mGy.    PROCEDURE: Consent obtained from the patient's family.    Patient placed prone. Left flank was prepped and draped in the usual  sterile fashion. 1% lidocaine without epinephrine was used for local  anesthesia.    Left percutaneous Chiba needle nephrostomy made under ultrasound  guidance. Ultrasound image saved in the patient's record.    Calyceal access confirmed with contrast.    Needle exchanged over guidewire for the [place] [employer] AccuStick II  sheath. Sheath removed over guidewire. Track dilated over guidewire to  8 Maltese size. 8 [place]ese [facility] locking pigtail catheter  advanced over guidewire and placed as a left percutaneous nephrostomy  tube. Guidewire removed. Pigtail formed and locked in the renal  pelvis.    Fluoroscopic image documenting placement and position of the left  percutaneous nephrostomy tube was saved in the patient's record.    2-0 nylon  catheter retaining suture and sterile dressing applied.  Catheter to gravity drainage. No immediate complication.      Impression    IMPRESSION: 8 [facility] locking pigtail catheter  placed as a left percutaneous nephrostomy tube. Catheter to gravity  drainage. When urosepsis resolves nephrostomy tube removal could be  considered.    GAETANO CORTEZ MD         SYSTEM ID:  [id number]

## 2024-11-29 NOTE — PROGRESS NOTES
Assumed care 1900 - 1130    ICU End of Shift Summary. See flowsheets for vital signs and detailed assessment.    Changes this shift: Pt remained very lethargic overnight. Pt much more alert upon 0800 assessment, following commands and verbally responding to questions. Remains disoriented to time and situation. Mitts remain in place for pt safety. Up to chair with PT/OT. SB 50-60s. Afebrile. Levo gtt weaned per tolerance. UE US obtained. Tolerating RA. Initiated TF at start of shift, now NPO since 0000. Aneuric via suprapubic catheter. No BM. Miralax given via NG.    Plan: cardiology consult. Monitor mental status. Plan for nephrostomy tube placement in IR today.

## 2024-11-29 NOTE — PROGRESS NOTES
"Urology  Progress Note    NAEO  L PNT placed yesterday   Increasingly alert and following some commands   Pressor requirements decreasing  SPT exchanged on 11/28/24, remains oliguric     Exam  BP 90/61   Pulse 59   Temp (P) 98.1  F (36.7  C) (Axillary)   Resp 14   Ht 1.6 m (5' 3\")   Wt 76 kg (167 lb 8.8 oz)   SpO2 100%   BMI 29.68 kg/m    No acute distress  Unlabored breathing  Abdomen soft,  appropriately tender, nondistended.   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 Labs on admission notable for no leukocytosis, downtrending CRP, and MADHU with elevated creatinine of 1.8 from recent baseline 0.8-1.0. Considered PNT placement however IR with multiple concerns given no clear etiology for hypotension recommending further workup. Primary team pursuing workup of chronotropic incompetence as etiology for hypotension with plan for EP evaluation once more clinically stable.    Patient is now s/p SPT exchange and L PNT placement. As she has now been maximally decompressed obstruction has been removed as an etiology for elevated creatinine and oliguria.    - Continue L PNT and SPT to drainage, ok to flush PRN  - SPT exchanged 11/28/24, continue regular exchanges on a monthly basis  - Follow up urine culture for L PNT  - If creatinine remains elevated following decompression recommend exploring " "alternative etiologies for impaired renal function given patient is maximally decompressed at this point   - Urology will follow peripherally, please reach out with questions or concerns    Seen and examined with the chief resident and staff surgeon, Dr. Fritz Linda MD, PGY2  Urology Resident    Contacting the urology team: Please see [employer] and page on-call clinician with any questions or concerns regarding this patient. Note writer may be unavailable.     To access [employer] from intranet: under \"Applications\" --> \"Business Applications\" select \"[employer]\" and search \"Urology Adult & Pediatric/[facility].\" Please note that any question about a urology inpatient, [place] or [place], should go to job code [id number].      "

## 2024-11-29 NOTE — PROGRESS NOTES
"Gynecology Oncology Progress Note  November 28, 2024    Ms. Alis Hartman is a 69 year old HD#3 admitted for urosepsis    Dz: Serous endometrial adenocarcinoma s/p SNEHA/BSO (7/12/2024) s/p Cycle 3 carbo/taxel (10/15/24)    24 hour events:   - somnolent overnight requiring narcan x 1  - COWS score 9, improved to 1    Subjective: Patient is very somnolent this morning, will engage by opening eyes and withdrawing hand to gentle stimulation. Unable to answer any questions.    Objective:   /68   Pulse 68   Temp 97.8  F (36.6  C) (Axillary)   Resp 12   Ht 1.6 m (5' 3\")   Wt 75.6 kg (166 lb 10.7 oz)   SpO2 97%   BMI 29.52 kg/m      General: cachectic-appearing female, in NAD, soft restraints in place  CV: RRR  Resp: CTAB, no increased work of breathing  Abdomen: soft, non-tender, non-distended  Extremities: nontender, 2+ edema in b/l lower extremities   Lines/Drains: Supra-pubic cath site non-erythematous, non-tender, no drainage    I/Os  (Yesterday // Since Midnight)  IV: 3350 mL // 296 mL  PO: 0 mL // 0 mL  Urine 192 mL // 17 mL  Drains: suprapubic catheter, port, pIV    New labs/imaging- ***update***   Latest Reference Range & Units 11/28/24 02:06   Sodium 135 - 145 mmol/L 132 (L)   Potassium 3.4 - 5.3 mmol/L 4.2   Chloride 98 - 107 mmol/L 99   Carbon Dioxide (CO2) 22 - 29 mmol/L 24   Urea Nitrogen 8.0 - 23.0 mg/dL 20.8   Creatinine 0.51 - 0.95 mg/dL 1.75 (H)   GFR Estimate >60 mL/min/1.73m2 31 (L)   Calcium 8.8 - 10.4 mg/dL 7.8 (L)   Anion Gap 7 - 15 mmol/L 9   Magnesium 1.7 - 2.3 mg/dL 2.0   Phosphorus 2.5 - 4.5 mg/dL 3.8   Glucose 70 - 99 mg/dL 133 (H)   Lactic Acid 0.7 - 2.0 mmol/L 1.4   (L): Data is abnormally low  (H): Data is abnormally high     Latest Reference Range & Units 11/28/24 02:06   FIO2  21   Ph Venous 7.32 - 7.43  7.44 (H)   PCO2 Venous 40 - 50 mm Hg 42   PO2 Venous 25 - 47 mm Hg 24 (L)   O2 Sat, Venous 70.0 - 75.0 % 31.9 (L)   Bicarbonate Venous 21 - 28 mmol/L 28   Base Excess " Venous -3.0 - 3.0 mmol/L 3.8 (H)   Oxyhemoglobin Venous 70 - 75 % 31 (L)   (H): Data is abnormally high  (L): Data is abnormally low     Latest Reference Range & Units 11/28/24 02:06   WBC 4.0 - 11.0 10e3/uL 9.4   Hemoglobin 11.7 - 15.7 g/dL 9.0 (L)   Hematocrit 35.0 - 47.0 % 28.2 (L)   Platelet Count 150 - 450 10e3/uL 120 (L)   RBC Count 3.80 - 5.20 10e6/uL 2.93 (L)   MCV 78 - 100 fL 96   MCH 26.5 - 33.0 pg 30.7   MCHC 31.5 - 36.5 g/dL 31.9   RDW 10.0 - 15.0 % 17.6 (H)   (L): Data is abnormally low  (H): Data is abnormally high      Previous labs/imaging:   Latest Reference Range & Units 11/27/24 16:55   Sodium 135 - 145 mmol/L 131 (L)   Potassium 3.4 - 5.3 mmol/L 3.6   Chloride 98 - 107 mmol/L 99   Carbon Dioxide (CO2) 22 - 29 mmol/L 23   Urea Nitrogen 8.0 - 23.0 mg/dL 19.7   Creatinine 0.51 - 0.95 mg/dL 1.63 (H)   GFR Estimate >60 mL/min/1.73m2 33 (L)   Calcium 8.8 - 10.4 mg/dL 7.4 (L)   Anion Gap 7 - 15 mmol/L 9   Glucose 70 - 99 mg/dL 147 (H)   Lactic Acid 0.7 - 2.0 mmol/L 1.4   (L): Data is abnormally low  (H): Data is abnormally high     Latest Reference Range & Units 11/27/24 16:55   FIO2  21   Ph Venous 7.32 - 7.43  7.43   PCO2 Venous 40 - 50 mm Hg 41   PO2 Venous 25 - 47 mm Hg 48 (H)   O2 Sat, Venous 70.0 - 75.0 % 80.8 (H)   Bicarbonate Venous 21 - 28 mmol/L 27   Base Excess Venous -3.0 - 3.0 mmol/L 2.9   Oxyhemoglobin Venous 70 - 75 % 79 (H)   (H): Data is abnormally high      Assessment/Plan:  Alis Hartman is a 69 year old female with 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 Has had waxing and waning mental status throughout admission, though has been more somnolent for the past 24 hours.    # Urosepsis  # Septic shock  # Oliguria  # MADHU  # CKD  stage 3a  The patient has a history of ESBL urosepsis and bacteremia. Received zosyn and vancomycin and is now improving on ertapenem, though with worsening oliguria. Likely intrarenal MADHU with elevated Cr as patient has received 3.3L of fluids over the last 24 hours and is up net 5L. Catheter flush yesterday 11/27 yielded good return and is flowing well, so less likely to be post-renal/obstructive.   - Continue Ertapenam per ID recommendations  - IR consult for possible left PNT today. TF held at midnight.   - Suprapubic cath care. S/p exchange yesterday 11/28.  - Trend CMP, CBC, mag, phos, lactic acid  - Creatinine elevated at 1.8 with a baseline of 0.8 and a GFR of 30. Will continue to trend  - Avoid nephrotoxic medications   - somnolent this morning, no focal neurologic deficits. Likely multifactorial, will continue to monitor closely.      # Serous endometrial adenocarcinoma of uterus  - S/p SNEHA, BSO (7/12/2024), cystotomy w/ suprapubic catheter, s/p Cycle 3 carbo/taxel (10/15/24). Chemo currently on hold while determining clinical course    #Hypomagnesemia  #Hypokalemia  - On presentation, patient with hypokalemia, mild hypomagnesemia. The patient's calcium is 7.6, but corrected for her albumin it's normal at 9.0. ***  - ERP  - Regular diet  - Encourage PO intake,   - Monitor VS and UOP     # Atrial fibrillation on eliquis  On admission, the patient hadn't taken her eliquis since Saturday, 11/23. She states that sometimes she has a hard time remembering to take medicine. Will readdress starting lovenox and bridging to eliquis once patient is more stable.     # Vaginal burning, itching, discharge  Patient with vaginal discharge, burning, and itching for 2 weeks now.   - Wet prep negative for yeast, trich, BV, GC/CT.     # Chronic stable conditions  - Anemia of chronic disease: hemoglobin 9.6  - Atrial Fibrillation: hold PTA Eliquis, atenolol in the setting of sepsis  - Asthma: PTA albuterol   - MDD,  Schizophrenia: altered this morning, suspect delirium but will continue to monitor closely  - Hx/o gastric bypass - avoid NSAIDs     Code status: After discussion with patient and daughter when patient had capacity, remains full code.     Coral Plaza MD  Ob/Gyn Resident, PGY-4  11/28/2024 9:16 PM

## 2024-11-29 NOTE — PLAN OF CARE
ICU End of Shift Summary (Assumed care [id number]). See flowsheets for vital signs and detailed assessment.    Changes this shift: Sent for L-PNT tube; tolerated well, without issue. Minimal serosang UOP since; not able to collect UA/UC from PNT thus far. Bolus'd with 500cc LR. Otherwise alert, but voice hoarse/quiet/whispers, difficult to hear, confused, oriented to self only. Denies pain; on capnography/room-air. Unsecured MITTs on for line-pulling. NGT running trophic feeds only. LMB 2x days ago. SpO2 very difficult to obtain; Lactic and VBGs sent- OK, pt asymptomatic and not SOB/RIZVI. EP consulted for PPM, no plans for intervention this admission, will work-up pt for PPM as outpt.     Plan: Monitor renal. Collect/send-off PNT UCx.     Goal Outcome Evaluation:      Plan of Care Reviewed With: patient, child    Overall Patient Progress: improvingOverall Patient Progress: improving    Outcome Evaluation: Alert/not-somnolent, but confused. S/P PNT neph tube today.    Problem: UTI (Urinary Tract Infection)  Goal: Improved Infection Symptoms  Outcome: Progressing

## 2024-11-29 NOTE — PROGRESS NOTES
"Brief progress note    S: presented to bedside for p.m. rounds. Patient opens her eyes with her name, but immediately closes them afterwards. Will not verbally respond to any auditory stimulus, and not following my commands. Per nursing the patient occasionally has periods where she does respond to commands, but those times are very intermittent. Per nursing, the patient's lethargy is staying the same and has not worsened since the Narcan wore off last night.    O:  BP (!) 82/55   Pulse 60   Temp 97.3  F (36.3  C) (Oral)   Resp 10   Ht 1.6 m (5' 3\")   Wt 75.6 kg (166 lb 10.7 oz)   SpO2 100%   BMI 29.52 kg/m      Gen: patient lying in bed, opens eyes to her name, but will not respond. Not following commands. Lying supine with soft restraints. Diffuse edema.  CV: regular rate, rhythm  Pulm: no increased work of breathing    Blood glucose: 120 at 1530 today  Levophed: 0.05    A/P:    Patient's mental state has not recovered since yesterday midday. She continues to be minimally responsive, which is not baseline for her but her her daughter who came to visit today. Levophed will be continued per ICU protocol and as determined by primary team. May consider thiamine replacement given history of gastric bypass and altered mental status. Will defer to primary team.    Ob/gyn will continue to follow    Ashwin Mathis MD  Gynecologic-Oncology, PGY-2   11/28/2024 7:46 PM    Gyn-Onc pager: [phone]   "

## 2024-11-29 NOTE — PROGRESS NOTES
Patient Name: Alis Hartman  Medical Record Number: [id number]  Today's Date: 11/29/2024    Procedure: Left percutaneous nephrostomy tube placement  Proceduralist: Dr. Gregory  Pathology present: NA    Procedure Start: 1353  Procedure end: 1405  Sedation medications administered: Versed 0.75 mg and Fentanyl 37.5 mcg     Report given to: Brenden CRAMER  : ANG    Other Notes: Pt arrived to IR room 1 from . Consent reviewed. Pt denies any questions or concerns regarding procedure. Pt positioned prone and monitored per protocol. Pt tolerated procedure without any noted complications. Pt transferred back to .

## 2024-11-29 NOTE — CONSULTS
"See IR Consult by REYES Contreras 11/27/2024.    Team reaching back out for LEFT PNT request. Creatinine continues to rise, patient has hydronephrosis. Would like PNT today, FRIDAY 11/29    ===  Recommendations/Plan:    Patient is on IR ADD-ON schedule TODAY FRIDAY 11/29 for a Placement of LEFT nephrostomy tube    *Please note the date of procedure is tentative and that the Timing of Procedure is TBD Based on Staffing/Schedule and Triage.*    Please contact the IR charge RN at [phone] for estimated time of procedure    Case and imaging discussed with IR Dr. Avery Gregory    ===  Vitals:   BP (!) 84/56   Pulse 59   Temp 98.2  F (36.8  C) (Axillary)   Resp 13   Ht 1.6 m (5' 3\")   Wt 76 kg (167 lb 8.8 oz)   SpO2 100%   BMI 29.68 kg/m      Pertinent Labs:   Lab Results   Component Value Date    WBC 7.8 11/29/2024    WBC 9.4 11/28/2024    WBC 11.0 11/27/2024    WBC 3.9 (L) 01/26/2015     Lab Results   Component Value Date    HGB 8.3 11/29/2024    HGB 9.0 11/28/2024    HGB 9.6 11/27/2024    HGB 11.9 01/26/2015     Lab Results   Component Value Date     11/29/2024     11/28/2024     11/27/2024     01/26/2015     Lab Results   Component Value Date    INR 1.09 11/27/2024    PTT 26 09/21/2024     Lab Results   Component Value Date    POTASSIUM 4.1 11/29/2024    POTASSIUM 3.2 (L) 07/12/2024    POTASSIUM 4.3 05/16/2019        COVID-19 Antibody Results, Testing for Immunity           No data to display              COVID-19 PCR Results          7/29/2024    18:01 11/27/2024    06:07   COVID-19 PCR Results   SARS CoV2 PCR Negative  Negative          Jonathan Walker PA-C  Interventional Radiology  Pager: [phone]    "

## 2024-11-29 NOTE — PROCEDURES
[facility]    Procedure: IR Procedure Note    Date/Time: 11/29/2024 2:08 PM    Performed by: Avery Gregory MD  Authorized by: Avery Gregory MD  IR Fellow Physician:    Pre Procedure Diagnosis: suspected urosepsis  Post Procedure Diagnosis: suspected urosepsis    UNIVERSAL PROTOCOL   Site Marked: NA  Prior Images Obtained and Reviewed:  Yes  Required items: Required blood products, implants, devices and special equipment available    Patient identity confirmed:  Arm band, hospital-assigned identification number and provided demographic data  Patient was reevaluated immediately before administering moderate or deep sedation or anesthesia  Confirmation Checklist:  Patient's identity using two indicators  Time out: Immediately prior to the procedure a time out was called    Universal Protocol: the Joint Commission Universal Protocol was followed    Preparation: Patient was prepped and draped in usual sterile fashion    ESBL (mL):  2     ANESTHESIA    Anesthesia:  Local infiltration  Local Anesthetic:  Lidocaine 1% with epinephrine  Anesthetic Total (mL):  10      SEDATION  Patient Sedated: Yes    Sedation Type:  Moderate (conscious) sedation  Sedation:  Fentanyl and midazolam  Vital signs: Vital signs monitored during sedation    Fluoroscopy Time: 1 minute(s)  Findings: 8 Japanese Skater locking pigtail catheter placed as left percutaneous nephrostomy tube.    Specimens: none    Procedural Complications: None    Condition: Stable      PROCEDURE    Patient Tolerance:  Patient tolerated the procedure well with no immediate complications  Length of time physician/provider present for 1:1 monitoring during sedation:  0-22 min

## 2024-11-30 ENCOUNTER — APPOINTMENT (OUTPATIENT)
Dept: SPEECH THERAPY | Facility: CLINIC | Age: 69
DRG: 698 | End: 2024-11-30
Payer: COMMERCIAL

## 2024-11-30 LAB
ANION GAP SERPL CALCULATED.3IONS-SCNC: 8 MMOL/L (ref 7–15)
BASE EXCESS BLDV CALC-SCNC: 2 MMOL/L (ref -3–3)
BASE EXCESS BLDV CALC-SCNC: 2.6 MMOL/L (ref -3–3)
BASE EXCESS BLDV CALC-SCNC: 3.1 MMOL/L (ref -3–3)
BUN SERPL-MCNC: 28.8 MG/DL (ref 8–23)
CALCIUM SERPL-MCNC: 7.9 MG/DL (ref 8.8–10.4)
CHLORIDE SERPL-SCNC: 101 MMOL/L (ref 98–107)
CREAT SERPL-MCNC: 2.01 MG/DL (ref 0.51–0.95)
EGFRCR SERPLBLD CKD-EPI 2021: 26 ML/MIN/1.73M2
ERYTHROCYTE [DISTWIDTH] IN BLOOD BY AUTOMATED COUNT: 17.2 % (ref 10–15)
GLUCOSE BLDC GLUCOMTR-MCNC: 110 MG/DL (ref 70–99)
GLUCOSE BLDC GLUCOMTR-MCNC: 126 MG/DL (ref 70–99)
GLUCOSE BLDC GLUCOMTR-MCNC: 129 MG/DL (ref 70–99)
GLUCOSE BLDC GLUCOMTR-MCNC: 139 MG/DL (ref 70–99)
GLUCOSE BLDC GLUCOMTR-MCNC: 91 MG/DL (ref 70–99)
GLUCOSE SERPL-MCNC: 137 MG/DL (ref 70–99)
HCO3 BLDV-SCNC: 27 MMOL/L (ref 21–28)
HCO3 BLDV-SCNC: 27 MMOL/L (ref 21–28)
HCO3 BLDV-SCNC: 28 MMOL/L (ref 21–28)
HCO3 SERPL-SCNC: 24 MMOL/L (ref 22–29)
HCT VFR BLD AUTO: 25.3 % (ref 35–47)
HGB BLD-MCNC: 8.3 G/DL (ref 11.7–15.7)
MAGNESIUM SERPL-MCNC: 2.3 MG/DL (ref 1.7–2.3)
MCH RBC QN AUTO: 31.4 PG (ref 26.5–33)
MCHC RBC AUTO-ENTMCNC: 32.8 G/DL (ref 31.5–36.5)
MCV RBC AUTO: 96 FL (ref 78–100)
O2/TOTAL GAS SETTING VFR VENT: 2 %
O2/TOTAL GAS SETTING VFR VENT: 21 %
O2/TOTAL GAS SETTING VFR VENT: 21 %
OXYHGB MFR BLDV: 57 % (ref 70–75)
OXYHGB MFR BLDV: 61 % (ref 70–75)
OXYHGB MFR BLDV: 71 % (ref 70–75)
PCO2 BLDV: 41 MM HG (ref 40–50)
PCO2 BLDV: 41 MM HG (ref 40–50)
PCO2 BLDV: 43 MM HG (ref 40–50)
PH BLDV: 7.42 [PH] (ref 7.32–7.43)
PH BLDV: 7.42 [PH] (ref 7.32–7.43)
PH BLDV: 7.43 [PH] (ref 7.32–7.43)
PHOSPHATE SERPL-MCNC: 3.9 MG/DL (ref 2.5–4.5)
PLATELET # BLD AUTO: 94 10E3/UL (ref 150–450)
PO2 BLDV: 35 MM HG (ref 25–47)
PO2 BLDV: 37 MM HG (ref 25–47)
PO2 BLDV: 42 MM HG (ref 25–47)
POTASSIUM SERPL-SCNC: 3.7 MMOL/L (ref 3.4–5.3)
RBC # BLD AUTO: 2.64 10E6/UL (ref 3.8–5.2)
SAO2 % BLDV: 57.9 % (ref 70–75)
SAO2 % BLDV: 62.8 % (ref 70–75)
SAO2 % BLDV: 72.7 % (ref 70–75)
SODIUM SERPL-SCNC: 133 MMOL/L (ref 135–145)
WBC # BLD AUTO: 7.9 10E3/UL (ref 4–11)

## 2024-11-30 PROCEDURE — 250N000013 HC RX MED GY IP 250 OP 250 PS 637: Performed by: INTERNAL MEDICINE

## 2024-11-30 PROCEDURE — 82805 BLOOD GASES W/O2 SATURATION: CPT

## 2024-11-30 PROCEDURE — P9047 ALBUMIN (HUMAN), 25%, 50ML: HCPCS | Mod: JZ

## 2024-11-30 PROCEDURE — 83735 ASSAY OF MAGNESIUM: CPT

## 2024-11-30 PROCEDURE — 200N000002 HC R&B ICU UMMC

## 2024-11-30 PROCEDURE — 250N000013 HC RX MED GY IP 250 OP 250 PS 637

## 2024-11-30 PROCEDURE — 250N000011 HC RX IP 250 OP 636

## 2024-11-30 PROCEDURE — 93005 ELECTROCARDIOGRAM TRACING: CPT

## 2024-11-30 PROCEDURE — 258N000003 HC RX IP 258 OP 636: Performed by: OBSTETRICS & GYNECOLOGY

## 2024-11-30 PROCEDURE — 84100 ASSAY OF PHOSPHORUS: CPT

## 2024-11-30 PROCEDURE — 80048 BASIC METABOLIC PNL TOTAL CA: CPT

## 2024-11-30 PROCEDURE — 99291 CRITICAL CARE FIRST HOUR: CPT | Mod: GC | Performed by: INTERNAL MEDICINE

## 2024-11-30 PROCEDURE — 92610 EVALUATE SWALLOWING FUNCTION: CPT | Mod: GN

## 2024-11-30 PROCEDURE — 93010 ELECTROCARDIOGRAM REPORT: CPT | Performed by: INTERNAL MEDICINE

## 2024-11-30 PROCEDURE — 250N000011 HC RX IP 250 OP 636: Mod: JZ

## 2024-11-30 PROCEDURE — 85048 AUTOMATED LEUKOCYTE COUNT: CPT

## 2024-11-30 PROCEDURE — 92526 ORAL FUNCTION THERAPY: CPT | Mod: GN

## 2024-11-30 PROCEDURE — 250N000011 HC RX IP 250 OP 636: Performed by: OBSTETRICS & GYNECOLOGY

## 2024-11-30 PROCEDURE — 85014 HEMATOCRIT: CPT

## 2024-11-30 RX ORDER — HEPARIN SODIUM 10000 [USP'U]/100ML
0-5000 INJECTION, SOLUTION INTRAVENOUS CONTINUOUS
Status: DISCONTINUED | OUTPATIENT
Start: 2024-11-30 | End: 2024-11-30

## 2024-11-30 RX ORDER — AMINO ACIDS/PROTEIN HYDROLYS 11G-40/45
1 LIQUID IN PACKET (ML) ORAL DAILY
Status: DISCONTINUED | OUTPATIENT
Start: 2024-11-30 | End: 2024-12-08 | Stop reason: HOSPADM

## 2024-11-30 RX ORDER — ALBUMIN (HUMAN) 12.5 G/50ML
25 SOLUTION INTRAVENOUS ONCE
Status: COMPLETED | OUTPATIENT
Start: 2024-11-30 | End: 2024-11-30

## 2024-11-30 RX ORDER — QUETIAPINE FUMARATE 100 MG/1
400 TABLET, FILM COATED ORAL 2 TIMES DAILY
Status: DISCONTINUED | OUTPATIENT
Start: 2024-11-30 | End: 2024-11-30

## 2024-11-30 RX ORDER — QUETIAPINE FUMARATE 100 MG/1
400 TABLET, FILM COATED ORAL ONCE
Status: COMPLETED | OUTPATIENT
Start: 2024-11-30 | End: 2024-11-30

## 2024-11-30 RX ORDER — PANTOPRAZOLE SODIUM 40 MG/1
40 TABLET, DELAYED RELEASE ORAL
Status: DISCONTINUED | OUTPATIENT
Start: 2024-11-30 | End: 2024-11-30

## 2024-11-30 RX ORDER — POTASSIUM CHLORIDE 20MEQ/15ML
10 LIQUID (ML) ORAL ONCE
Status: COMPLETED | OUTPATIENT
Start: 2024-11-30 | End: 2024-11-30

## 2024-11-30 RX ADMIN — BENZTROPINE MESYLATE 1 MG: 0.5 TABLET ORAL at 08:44

## 2024-11-30 RX ADMIN — APIXABAN 2.5 MG: 2.5 TABLET, FILM COATED ORAL at 21:04

## 2024-11-30 RX ADMIN — SERTRALINE HYDROCHLORIDE 150 MG: 100 TABLET ORAL at 12:31

## 2024-11-30 RX ADMIN — POLYETHYLENE GLYCOL 3350 17 G: 17 POWDER, FOR SOLUTION ORAL at 08:44

## 2024-11-30 RX ADMIN — ERTAPENEM 500 MG: 1 INJECTION INTRAMUSCULAR; INTRAVENOUS at 05:13

## 2024-11-30 RX ADMIN — SENNOSIDES AND DOCUSATE SODIUM 1 TABLET: 50; 8.6 TABLET ORAL at 08:44

## 2024-11-30 RX ADMIN — ACETAMINOPHEN 650 MG: 325 TABLET, FILM COATED ORAL at 04:53

## 2024-11-30 RX ADMIN — CYANOCOBALAMIN TAB 500 MCG 500 MCG: 500 TAB at 08:44

## 2024-11-30 RX ADMIN — ALBUMIN (HUMAN) 25 G: 0.25 INJECTION, SOLUTION INTRAVENOUS at 16:15

## 2024-11-30 RX ADMIN — Medication 60 ML: at 16:06

## 2024-11-30 RX ADMIN — HYDROCORTISONE SODIUM SUCCINATE 50 MG: 100 INJECTION, POWDER, FOR SOLUTION INTRAMUSCULAR; INTRAVENOUS at 04:52

## 2024-11-30 RX ADMIN — THIAMINE HYDROCHLORIDE 500 MG: 100 INJECTION, SOLUTION INTRAMUSCULAR; INTRAVENOUS at 21:04

## 2024-11-30 RX ADMIN — ACETAMINOPHEN 650 MG: 325 TABLET, FILM COATED ORAL at 21:07

## 2024-11-30 RX ADMIN — QUETIAPINE FUMARATE 400 MG: 100 TABLET ORAL at 12:32

## 2024-11-30 RX ADMIN — THIAMINE HYDROCHLORIDE 500 MG: 100 INJECTION, SOLUTION INTRAMUSCULAR; INTRAVENOUS at 08:41

## 2024-11-30 RX ADMIN — ACETAMINOPHEN 650 MG: 325 TABLET, FILM COATED ORAL at 12:30

## 2024-11-30 RX ADMIN — CALCIUM CARBONATE 600 MG (1,500 MG)-VITAMIN D3 400 UNIT TABLET 1 TABLET: at 18:01

## 2024-11-30 RX ADMIN — THIAMINE HYDROCHLORIDE 500 MG: 100 INJECTION, SOLUTION INTRAMUSCULAR; INTRAVENOUS at 16:01

## 2024-11-30 RX ADMIN — ACETAMINOPHEN 650 MG: 325 TABLET, FILM COATED ORAL at 15:58

## 2024-11-30 RX ADMIN — Medication 1 TABLET: at 08:45

## 2024-11-30 RX ADMIN — APIXABAN 2.5 MG: 2.5 TABLET, FILM COATED ORAL at 08:45

## 2024-11-30 RX ADMIN — CALCIUM CARBONATE 600 MG (1,500 MG)-VITAMIN D3 400 UNIT TABLET 1 TABLET: at 08:45

## 2024-11-30 RX ADMIN — POTASSIUM CHLORIDE 10 MEQ: 20 SOLUTION ORAL at 08:43

## 2024-11-30 RX ADMIN — HYDROCORTISONE SODIUM SUCCINATE 50 MG: 100 INJECTION, POWDER, FOR SOLUTION INTRAMUSCULAR; INTRAVENOUS at 15:58

## 2024-11-30 RX ADMIN — Medication 40 MG: at 12:32

## 2024-11-30 ASSESSMENT — ACTIVITIES OF DAILY LIVING (ADL)
ADLS_ACUITY_SCORE: 60
ADLS_ACUITY_SCORE: 62
ADLS_ACUITY_SCORE: 58
ADLS_ACUITY_SCORE: 62
ADLS_ACUITY_SCORE: 60
ADLS_ACUITY_SCORE: 62
ADLS_ACUITY_SCORE: 58
ADLS_ACUITY_SCORE: 62
ADLS_ACUITY_SCORE: 62
ADLS_ACUITY_SCORE: 58
ADLS_ACUITY_SCORE: 62

## 2024-11-30 NOTE — PROGRESS NOTES
"CLINICAL NUTRITION SERVICES - BRIEF NOTE     Nutrition Prescription    Recommendations already ordered by Registered Dietitian (RD):  Advance TF to goal:  Osmolite 1.5 @ 50 mL/hr x 24 hrs + 1 pkt Prosource TF20 providing 1200 mL, 1880 kcal (33 kcal/kg), 96 g protein (1.7 g/kg), 248 g CHO, 0 g fiber, and 914 mL free water per DW of 57.4 kg.   Advance to 20 mL/hr now. Advance as tolerated by 10 mL q 8 hr to goal rate of 50 mL/hr  FWF: decreased to 30 mL q 4 hrs minimum for tube patency  Updated enteral access to be NGT    Future/Additional Recommendations:  Monitor TF tolerance, advancement to goal, lytes.     REASON FOR ASSESSMENT  Alis Hartman is a/an 69 year old female assessed by the dietitian for advancing TF    CURRENT NUTRITION ORDERS  Diet: Full Liquid    Nutrition support: Osmolite @ 10 mL/hr   Enteral access: OGT previously. 14 fr NGT placed 11/28    NEW FINDINGS  Labs: Na 133 (L).  Meds: novolog, levo @ 0.01    INTERVENTIONS  Implementation  Collaboration with other providers  Enteral Nutrition - advance to goal  Feeding tube flush      Monitoring/Evaluation  Progress toward goals will be monitored and evaluated per protocol.  Juanito Coon, RD, LD  Available on [employer]  Weekend/Holiday RD [facility] - \"Weekend Clinical Dietitian\"  No longer available by paging    "

## 2024-11-30 NOTE — PLAN OF CARE
ICU End of Shift Summary. See flowsheets for vital signs and detailed assessment.    Changes this shift: L-PNT culture collected.   Alert, confused, follows command. Mitts in place. VBG to monitor O2 collected, ok. Levo gtt map > 65. Sinus/zenobia.      Plan: continue POC    Goal Outcome Evaluation:      Plan of Care Reviewed With: patient    Overall Patient Progress: no changeOverall Patient Progress: no change    Outcome Evaluation: see note.

## 2024-11-30 NOTE — PROGRESS NOTES
"Brief progress note    S: Presented to bedside for p.m. rounds. Patient is somnolent but interactive, responds quietly to statements, but very difficult to hear. Otherwise seems to be improving. No pain, other concerns.     O:  /63   Pulse 56   Temp 97.5  F (36.4  C) (Oral)   Resp 12   Ht 1.6 m (5' 3\")   Wt 76 kg (167 lb 8.8 oz)   SpO2 100%   BMI 29.68 kg/m      Gen: Lying supine with MITTs that are unsecured. Diffuse edema.  CV: regular rate, rhythm  Pulm: no increased work of breathing    Levophed: 0.03    UOp: 0.17 ml/kg/hr - inadequate    A/P:    Patient's mental state has improved significantly since yesterday. She is now responsive, but difficult to understand. Levophed continued per ICU protocol. Patient continues to be oliguric and had a PNT placed today. She is also ~6L up and appears to be third-spacing. Could consider gentle diuretic when clinically stable. Will defer to primary team    Ob/gyn will continue to follow    Ashwin Mathis MD  Gynecologic-Oncology, PGY-2   11/29/2024 9:13 PM    Gyn-Onc pager: [phone]   " Not on meds, not able to check lipids as p-t is not fasting  Will benefit from statin, considering h/o DM, p-t would like to discuss it next time  Cont low carb diet and weight loss

## 2024-11-30 NOTE — PROGRESS NOTES
"Gynecology Oncology Progress Note  2024    Ms. Alis Hartman is a 69 year old HD#4 admitted for urosepsis    Dz: Serous endometrial adenocarcinoma s/p SNEHA/BSO (2024) s/p Cycle 3 carbo/taxel (10/15/24)    24 hour events:   - Oliguria (0.04 ml/kg/hr)  - IR placed L PNT  - Cards: eval pt when out of ICU for possible PPM implant    Subjective: Patient is more engaged this morning with eyes open, able to answer yes/no questions. Reports some pain from the NG tube, still only able to whisper.     Objective:   /71   Pulse 57   Temp 98.5  F (36.9  C) (Oral)   Resp 24   Ht 1.6 m (5' 3\")   Wt 76 kg (167 lb 8.8 oz)   SpO2 94%   BMI 29.68 kg/m      General: cachectic-appearing female, in NAD  CV: Regular rate  Resp: No increased work of breathing  Abdomen: soft, non-tender, non-distended  Extremities: nontender, 2+ edema in b/l lower extremities   Lines/Drains: Supra-pubic cath site non-erythematous, non-tender, no drainage    I/Os  (Yesterday // Since Midnight)  IV: 394 mL // 32.4 mL  N mL // 30 mL  Enteral: 90 mL // 20 mL  Urine 140 mL // NR mL  UOp: 0.07 ml/kg/hr    Drains: suprapubic catheter, port, pIV    Recent Results (from the past 24 hours)   Glucose by meter    Collection Time: 24  7:47 AM   Result Value Ref Range    GLUCOSE BY METER POCT 112 (H) 70 - 99 mg/dL   Glucose by meter    Collection Time: 24 12:30 PM   Result Value Ref Range    GLUCOSE BY METER POCT 96 70 - 99 mg/dL   Glucose by meter    Collection Time: 24  3:10 PM   Result Value Ref Range    GLUCOSE BY METER POCT 114 (H) 70 - 99 mg/dL   Lactic acid whole blood    Collection Time: 24  3:12 PM   Result Value Ref Range    Lactic Acid 0.8 0.7 - 2.0 mmol/L   Blood gas venous    Collection Time: 24  3:12 PM   Result Value Ref Range    pH Venous 7.41 7.32 - 7.43    pCO2 Venous 41 40 - 50 mm Hg    pO2 Venous 34 25 - 47 mm Hg    Bicarbonate Venous 26 21 - 28 mmol/L    Base Excess/Deficit Venous 1.5 " -3.0 - 3.0 mmol/L    FIO2 21     Oxyhemoglobin Venous 54 (L) 70 - 75 %    O2 Sat, Venous 55.4 (L) 70.0 - 75.0 %   Glucose by meter    Collection Time: 11/29/24  9:31 PM   Result Value Ref Range    GLUCOSE BY METER POCT 91 70 - 99 mg/dL   UA with Microscopic reflex to Culture    Collection Time: 11/29/24 10:02 PM    Specimen: Nephrostomy, Left; Urine   Result Value Ref Range    Color Urine Dark Brown (A) Colorless, Straw, Light Yellow, Yellow    Appearance Urine Cloudy (A) Clear    Glucose Urine 30 (A) Negative mg/dL    Bilirubin Urine Negative Negative    Ketones Urine Negative Negative mg/dL    Specific Gravity Urine 1.029 1.003 - 1.035    Blood Urine Large (A) Negative    pH Urine 6.0 5.0 - 7.0    Protein Albumin Urine 200 (A) Negative mg/dL    Urobilinogen Urine 2.0 Normal, 2.0 mg/dL    Nitrite Urine Negative Negative    Leukocyte Esterase Urine Small (A) Negative    RBC Urine >182 (H) <=2 /HPF    WBC Urine >182 (H) <=5 /HPF   Glucose by meter    Collection Time: 11/30/24 12:48 AM   Result Value Ref Range    GLUCOSE BY METER POCT 91 70 - 99 mg/dL   CBC with platelets    Collection Time: 11/30/24  4:44 AM   Result Value Ref Range    WBC Count 7.9 4.0 - 11.0 10e3/uL    RBC Count 2.64 (L) 3.80 - 5.20 10e6/uL    Hemoglobin 8.3 (L) 11.7 - 15.7 g/dL    Hematocrit 25.3 (L) 35.0 - 47.0 %    MCV 96 78 - 100 fL    MCH 31.4 26.5 - 33.0 pg    MCHC 32.8 31.5 - 36.5 g/dL    RDW 17.2 (H) 10.0 - 15.0 %    Platelet Count 94 (L) 150 - 450 10e3/uL   Basic metabolic panel    Collection Time: 11/30/24  4:44 AM   Result Value Ref Range    Sodium 133 (L) 135 - 145 mmol/L    Potassium 3.7 3.4 - 5.3 mmol/L    Chloride 101 98 - 107 mmol/L    Carbon Dioxide (CO2) 24 22 - 29 mmol/L    Anion Gap 8 7 - 15 mmol/L    Urea Nitrogen 28.8 (H) 8.0 - 23.0 mg/dL    Creatinine 2.01 (H) 0.51 - 0.95 mg/dL    GFR Estimate 26 (L) >60 mL/min/1.73m2    Calcium 7.9 (L) 8.8 - 10.4 mg/dL    Glucose 137 (H) 70 - 99 mg/dL   Magnesium    Collection Time: 11/30/24   4:44 AM   Result Value Ref Range    Magnesium 2.3 1.7 - 2.3 mg/dL   Phosphorus    Collection Time: 11/30/24  4:44 AM   Result Value Ref Range    Phosphorus 3.9 2.5 - 4.5 mg/dL   Blood gas venous    Collection Time: 11/30/24  4:44 AM   Result Value Ref Range    pH Venous 7.43 7.32 - 7.43    pCO2 Venous 41 40 - 50 mm Hg    pO2 Venous 37 25 - 47 mm Hg    Bicarbonate Venous 27 21 - 28 mmol/L    Base Excess/Deficit Venous 2.6 -3.0 - 3.0 mmol/L    FIO2 21     Oxyhemoglobin Venous 61 (L) 70 - 75 %    O2 Sat, Venous 62.8 (L) 70.0 - 75.0 %   Glucose by meter    Collection Time: 11/30/24  4:51 AM   Result Value Ref Range    GLUCOSE BY METER POCT 139 (H) 70 - 99 mg/dL     Assessment/Plan:  Alis Hartman is a 69 year old female with 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 Has had waxing and waning mental status throughout admission, though has been more awake over the past 24 hours and is now able to answer yes/no questions.    # Urosepsis  # Septic shock  # Oliguria  # MADHU  # CKD stage 3a  The patient has a history of ESBL urosepsis and bacteremia. Received zosyn and vancomycin and is now improving on ertapenem, though with worsening oliguria. Likely combination of prerenal and ATN MADHU with elevated Cr. Catheter flush and exchange yesterday yielded good return and is flowing well, so less likely to be post-renal/obstructive.   - Continue Ertapenam per ID recommendations. Pressor support needs decreasing.   - IR placed L PNT yesterday  - Suprapubic cath care. S/p exchange yesterday 11/28.  - Trend CMP, CBC, mag, phos, lactic acid  - Creatinine elevated at 2.01 with a baseline of 0.8 and a GFR of 30. Will continue to trend. Unchanged since yesterday  - Avoid nephrotoxic  "medications   - Somnolence improving, Likely multifactorial, will continue to monitor closely.   - oliguric but clinically fluid overloaded, will discuss with ICU team     # Serous endometrial adenocarcinoma of uterus  - S/p SNEHA, BSO (7/12/2024), cystotomy w/ suprapubic catheter, s/p Cycle 3 carbo/taxel (10/15/24). Chemo currently on hold while determining clinical course    #Hypomagnesemia  #Hypokalemia  - On presentation, patient with hypokalemia, mild hypomagnesemia  - ERP  - NG tube with tube feeds  - Monitor VS and UOP     # Atrial fibrillation on eliquis  # Concern for chronotropic incompetence  -On admission, the patient hadn't taken her eliquis since Saturday, 11/23. She states that sometimes she has a hard time remembering to take medicine. Will readdress starting lovenox and bridging to eliquis once patient is more stable.  -Per cardiology consult, once patient is out of the ICU will consider a PPM implant     # Vaginal burning, itching, discharge  Patient with vaginal discharge, burning, and itching for 2 weeks now.   - Wet prep negative for yeast, trich, BV, GC/CT.     # Chronic stable conditions  - Anemia of chronic disease: hemoglobin 8.3 today  - Atrial Fibrillation: hold PTA Eliquis, atenolol in the setting of sepsis  - Asthma: PTA albuterol   - MDD, Schizophrenia: altered this morning, suspect delirium but will continue to monitor closely  - Hx/o gastric bypass - avoid NSAIDs     Code status: After discussion with patient and daughter when patient had capacity, remains full code.     Clinically Significant Risk Factors     # Overweight: Estimated body mass index is 29.68 kg/m  as calculated from the following:    Height as of this encounter: 1.6 m (5' 3\").    Weight as of this encounter: 76 kg (167 lb 8.8 oz).  # Severe Malnutrition: based on nutrition assessment      Patient discussed with Dr. Lona Mathis MD  Gynecologic-Oncology, PGY-2   11/30/2024 5:57 AM    Gyn-Onc pager: [phone]

## 2024-11-30 NOTE — PROGRESS NOTES
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 Suspect AMS related to chronotropic incompetence and poor perfusion vs septic shock 2/2 obstructive uropathy.    CHANGES and MAJOR THINGS TODAY:   -Removed oxycodone since renally cleared and MADHU  -Cardiac Index: 2.8  -Investigated: BC from arm before abx but not the urine culture or BC from port  -TF 10>15   -Hepatic panel in AM  -Bedside U/S for IVC and lasix challenge if needed  -Bladder scan Qshift  -Pantoprazole 40 every day   -Bear hugger  -Speech consult  -Up to chair   -DVT PPX Booties  -Psych meds thru TF  -Hold seroquel after poor response back to home dose seroquel     PLAN:  Neuro:  # Multipfactorial sepsis and hypoglycemic encephalopathy (resolved)  # Symptomatic Bradycardia w/ poor perfusion  Per report, pt presented with AMS characterized by confusion. No recent falls or head trauma. No focal deficits on exam. Ddx includes hypoglycemia (blood glucose of 34 on arrival) vs sepsis vs shock. AMS resolved in ED after treatment with IVF, antibiotics, and D50. Possible that symptomatic bradycardia is causing poor perfusions and AMS.  - start IV thiamine iso malnutrition and gastric bypass  - additional workup in Cardiovascular section     # Sedation  Wakens easily to voice. Not on sedation.   - No sedation needed   - RASS goal 0     #  Acute on chronic abdominal pain   # Chronic back pain   # Chronic knee pain   Pt takes 5 mg oxycodone q6h PRN at home for chronic abdominal pain (2/2 presumed uterine cancer and abdominal surgeries) and back pain. Presented with worsening abdominal pain likely 2/2 UTI. No evidence of nephrolithiasis on CT CAP.  - tylenol 650mg q6h   - Consider Pain consult  - PTA diclofenac gel for knees   - hold PTA oxycodone 5 mg q6h PRN  - hold dilaudid 0.3mg q4h for break through pain   - hold 200mg TID gabapentin (PTA dose 800mg QID) iso AMS  - additional UTI plan in ID section  - COWS protocol    #Paranoid schizophrenia  BP dropped with PTA seroquel 400 mg and somnolent so will continue to hold.   - melatonin at bedtime  - psych meds through TF   -Benztropine 1 mg    -Sertraline 150 mg   -Hold seroquel , zolpidem     Pulmonary:  # Asthma   No evidence for asthma flare at this time. Breathing comfortably on RA.   - continue PTA albuterol inhaler PRN     Cardiovascular:  # Septic shock vs Symptomatic Chronotropic incompetence  Presented w/ BP 88/59, hypothermia w/ temp of 35.7 C, and UA c/f UTI. LA wnl on arrival but now rising. Given 1.5 L of NS in ED w/o much improvement in BP. On levophed through pt's port. Shock exacerbated by chronotropic incompetence. Likely that symptomatic bradycardia is causing poor perfusion and AMS. Administration of atropine increased HR from 55 to 72, and improved SBP from 70's to 100's. Additionally, SVO2 mildly improved.  - trend lactate  - infectious workup/management per ID section   - Levophed PRN w/ MAP goal > 65   - Cardiology EP consult for chronotropic incompetence, appreciate recs  - s/p L nephrostomy tube for possible source control and obstructive nephropathy    # Afib  Currently not in RVR, instead bradycardic. Has PTA atenolol which may be contributing to chronotropic incompetence in setting of sepsis, however medication should have cleared from system by now and bradycardia  persists.  - restart PTA apixaban 2.5mg BID  - hold PTA atenolol     GI/Nutrition:  # Nutrition  # Hx of Kiko en Y gastric bypass   - dietician consult   -TF   -Pantoprazole qd  -Mitt restraints to prevent pulling of lines/tubes    # Cholelithiasis and mild gallbladder distention  Findings seen on CT abd. Pt denies any RUQ pain. RUQ US negative for cholecystitis.  - CTM    Renal/Fluids/Electrolytes:  # MADHU w/ persistent left hydronephrosis   # Hx R hydronephrosis (resolved)  # CKD stage 3a  Prior to her most recent cancer surgery the patient was having significant urinary dysfunction likely secondary to history of radiation therapy. Thus at the time of hysterectomy with cystotomy decision was made to place suprapubic catheter due to her poor quality of life preoperatively. Cr 1.79 on admission, up from baseline ~0.9. CT CAP w/o evidence of nephrolithiasis but presence of persistent moderate left hydroureteronephrosis which extends down to level of the urinary bladder. Obstructive uropathy and possible septic shock contributing to MADHU.  - Urology consulted, appreciate recs    - NPO at midnight   - assess cardiac status as cause of clinical picture. If no cause determined will re-discuss plans for PNT placement   - daily BMP  - 1L LR  - s/p L nephrostomy tube for possible source control and obstructive nephropathy   - follow UA from neph tube     # Hypokalemia  # Hypomagnesemia   K 2.9 and Mg 1.6 on ED arrival, continuing to monitor and replete as needed.  - BMP, replace lytes PRN  - Mg replacement protocol  - K replacement protocol  - Phos replacement protocol    Endocrine:  # Hypoglycemia   Glucose 39 on arrival to ED. Given D50 x 2 with most recent glucose 113. Likely 2/2 poor oral intake in setting of sepsis.   - alyssa TF  - hypoglycemia protocol      ID:  # C/f Urosepsis   # Hx of cystotomy s/p suprapubic catheter (7/12/24) w/ resulting recurrent ESBL (Klebsiella) urosepsis  Recent diagnosis of uterine cancer  s/p hysterectomy w/ unfortunate complication of iatrogenic cystostomy. Cystostomy repaired 7/12/24 but given the challenge of the repair and damage to bladder from prior pelvic radiation, suprapubic catheter was placed. Since then, pt has had three UTIs (Klebsiella ESBL x 2 and E. Faecium and candida albicans). CT w/o nephrolithiasis but does show diffuse moderate L sided hydroureteronephrosis. Urine culture with urogenital elisa but possible that ureteral stricture preventing infected urine from traveling from kidney to bladder. Definitive diagnosis of urosepsis would require urine cultures from PNT.  - Urology consulted: recommend PNT placement by IR   - s/p L nephrostomy tube for possible source control and obstructive nephropathy    - follow UA from nephrostomy tube  - blood culture peripheral and from port NGTD  - urine culture with urogenital elisa  - suprapubic catheter exchanged by Urology 11/28  - Continue Ertapenem 11/27-**  - hydrocortisone 50 q6h until shock resolves  - if pressor need increases consider vasopressin given afib hx in order to spare levophed    # Vaginal burning, itching, discharge  Patient with vaginal discharge, burning, and itching for 2 weeks now.   - Wet prep negative for yeast, trich, and BV. GC/CT pending. Treat accordingly     Hematology:    # Serous endometrial adenocarcinoma of uterus  - S/p SNEHA, BSO (7/12/2024), cystotomy w/ suprapubic catheter, s/p Cycle 3 carbo/taxel (10/15/24). Chemo currently on hold while determining clinical course.    #Anemia of chronic disease: hemoglobin 9.6, stable     Musculoskeletal:  # Deconditioning  - would benefit from PT/OT consult once off pressors      # LE edema, chronic   - lymphedema consult placed     Skin:  # Decubitus ulcer  - WOC consulted     General Cares/Prophylaxis:    DVT Prophylaxis: Pneumatic Compression Devices  GI Prophylaxis: Pantoprazole qd  Restraints: mitts  Family Communication: none  Code Status:  "Full    Lines/tubes/drains:  - PIV x2 and port   - PICC    Disposition:  - Medical ICU    Patient seen and findings/plan discussed with medical ICU staff    Lissa Hunter MD    Clinically Significant Risk Factors         # Hyponatremia: Lowest Na = 133 mmol/L in last 2 days, will monitor as appropriate       # Hypoalbuminemia: Lowest albumin = 1.8 g/dL at 11/27/2024  5:50 AM, will monitor as appropriate     # Thrombocytopenia: Lowest platelets = 94 in last 2 days, will monitor for bleeding  # Acute Kidney Injury, unspecified: based on a >150% or 0.3 mg/dL increase in last creatinine compared to past 90 day average, will monitor renal function  # Hypertension: Noted on problem list            # Overweight: Estimated body mass index is 29.68 kg/m  as calculated from the following:    Height as of this encounter: 1.6 m (5' 3\").    Weight as of this encounter: 76 kg (167 lb 8.8 oz)., PRESENT ON ADMISSION  # Severe Malnutrition: based on nutrition assessment, PRESENT ON ADMISSION     # Financial/Environmental Concerns: none                  ====================================  INTERVAL HISTORY:   Overnight, RN wondering if should be trending ABG since SpO2 not reliable? Stable on room air. Did not trend overnight.     Patient mostly interactive during rounds. States that her pain was starting to  but ok at the moment.     Paged about low BP and AMS after taking home dose seroquel so have been increasing norepi as needed still within range. Pt somnolent/lethargic    OBJECTIVE:   1. VITAL SIGNS:   Temp:  [96.4  F (35.8  C)-98.6  F (37  C)] 96.4  F (35.8  C)  Pulse:  [51-67] 60  Resp:  [10-24] (P) 14  BP: ()/() 125/80  SpO2:  [91 %-100 %] 100 %  Resp: 14  2. INTAKE/ OUTPUT:   I/O last 3 completed shifts:  In: 1526.26 [I.V.:371.26; NG/GT:495; IV Piggyback:500]  Out: 195 [Urine:195]    3. PHYSICAL EXAMINATION:  General: NAD, lying in bed, arousable, cachectic appearing  HEENT: normocephalic, " atraumatic, dry mucous membranes  Neuro: A&Ox3, no formal CN exam completed however no gross abnormalities, moves all extremities  Pulm/Resp: breathing non-labored on RA  CV: RRR, no additional heart sounds  Abdomen: Soft, non-distended, nontender, suprapubic catheter in place  MSK: severe dependent edema in bilateral LE  Incisions/Skin: small sacral decubitus ulcer (media tab)    4. LABS:   Arterial Blood Gases   No lab results found in last 7 days.  Complete Blood Count   Recent Labs   Lab 11/30/24  0444 11/29/24  0446 11/28/24  0206 11/27/24  0550   WBC 7.9 7.8 9.4 11.0   HGB 8.3* 8.3* 9.0* 9.6*   PLT 94* 102* 120* 151     Basic Metabolic Panel  Recent Labs   Lab 11/30/24  1202 11/30/24  0451 11/30/24  0444 11/30/24  0048 11/29/24  0502 11/29/24  0446 11/28/24  0514 11/28/24  0206 11/27/24  2034 11/27/24  1655   NA  --   --  133*  --   --  133*  --  132*  --  131*   POTASSIUM  --   --  3.7  --   --  4.1  --  4.2  --  3.6   CHLORIDE  --   --  101  --   --  101  --  99  --  99   CO2  --   --  24  --   --  25  --  24  --  23   BUN  --   --  28.8*  --   --  26.8*  --  20.8  --  19.7   CR  --   --  2.01*  --   --  2.01*  --  1.75*  --  1.63*   * 139* 137* 91   < > 123*   < > 133*   < > 147*    < > = values in this interval not displayed.     Liver Function Tests  Recent Labs   Lab 11/27/24  0550 11/26/24  2250   AST  --  26   ALT 19 15   ALKPHOS 141 134   BILITOTAL 0.4 0.4   ALBUMIN 1.8* 1.8*   INR 1.09  --      Coagulation Profile  Recent Labs   Lab 11/27/24  0550   INR 1.09       5. RADIOLOGY:   No results found for this or any previous visit (from the past 24 hours).

## 2024-11-30 NOTE — PROGRESS NOTES
11/30/24 1302   Appointment Info   Signing Clinician's Name / Credentials (SLP) Priya Olivier MA CCC-SLP   General Information   Onset of Illness/Injury or Date of Surgery 11/26/24   Referring Physician Lissa Hunter MD   Patient/Family Therapy Goal Statement (SLP) None stated   Pertinent History of Current Problem Pt is a 69 year old female with PMH  of gastric bypass, serous endometrial adenocarcinoma of uterus s/p SNEHA, BSO (7/12/2024) s/p Cycle 3 carbo/taxel (10/15/24), cystotomy s/p suprapubic catheter w/ hx of recurrent ESBL (Klebsiella) urosepsis & bacteremia, A-fib on eliquis, HTN, CKD stage 3a, and anemia of chronic disease  who was admitted on 11/26/2024, transferred to gyn/onc service 11/27 for urosepsis and obstructive uropathy in setting of previous radiation treatments, transferred to the ICU two hours later for septic shock requiring pressors. Suspect AMS related to chronotropic incompetence and poor perfusion vs septic shock 2/2 obstructive uropathy. Clinical swallow eval completed per MD order.   General Observations Upon SLP arrival, pt positioned upright in bed, confused, but willing to participate.   Type of Evaluation   Type of Evaluation Swallow Evaluation   Oral Motor   Oral Musculature generally intact   Structural Abnormalities none present   Mucosal Quality dry;sticky;cracked   Dentition (Oral Motor)   Dentition (Oral Motor) edentulous   Facial Symmetry (Oral Motor)   Facial Symmetry (Oral Motor) WNL   Lip Function (Oral Motor)   Lip Range of Motion (Oral Motor) WNL   Tongue Function (Oral Motor)   Tongue ROM (Oral Motor) WNL   Cough/Swallow/Gag Reflex (Oral Motor)   Volitional Throat Clear/Cough (Oral Motor) reduced strength   Vocal Quality/Secretion Management (Oral Motor)   Vocal Quality (Oral Motor) hoarse   General Swallowing Observations   Past History of Dysphagia No hx of dysphagia per chart review.   Respiratory Support nasal cannula   Current Diet/Method of Nutritional  Intake (General Swallowing Observations, NIS) NPO;nasogastric tube (NG)   Swallowing Evaluation Clinical swallow evaluation   Clinical Swallow Evaluation   Feeding Assistance dependent   Clinical Swallow Evaluation Textures Trialed thin liquids;pureed   Clinical Swallow Eval: Thin Liquid Texture Trial   Mode of Presentation, Thin Liquids cup;spoon;straw;fed by clinician   Volume of Liquid or Food Presented 2 oz   Oral Phase of Swallow WFL   Pharyngeal Phase of Swallow impaired;throat clearing   Diagnostic Statement Overt s/sx of aspiration x1   Clinical Swallow Evaluation: Puree Solid Texture Trial   Mode of Presentation, Puree spoon;fed by clinician   Volume of Puree Presented 4 tbsp   Oral Phase, Puree WFL   Pharyngeal Phase, Puree intact   Diagnostic Statement No overt s/sx of aspiration   Esophageal Phase of Swallow   Patient reports or presents with symptoms of esophageal dysphagia No   Swallowing Recommendations   Diet Consistency Recommendations full liquid diet   Supervision Level for Intake 1:1 supervision needed   Mode of Delivery Recommendations bolus size, small;slow rate of intake   Swallowing Maneuver Recommendations alternate food and liquid intake   Medication Administration Recommendations, Swallowing (SLP) Crushed in puree   Instrumental Assessment Recommendations instrumental evaluation not recommended at this time   General Therapy Interventions   Planned Therapy Interventions Dysphagia Treatment   Dysphagia treatment Oropharyngeal exercise training;Modified diet education;Instruction of safe swallow strategies;Compensatory strategies for swallowing   Clinical Impression   Criteria for Skilled Therapeutic Interventions Met (SLP Eval) Yes, treatment indicated   SLP Diagnosis Pt at an elevated risk for aspiration given confusion, dysphonia, and missing teeth   Risks & Benefits of therapy have been explained evaluation/treatment results reviewed;care plan/treatment goals reviewed;risks/benefits  reviewed;current/potential barriers reviewed;participants voiced agreement with care plan;participants included;patient   Clinical Impression Comments Clinical swallow eval completed per MD order. Pt is at an elevated risk for aspiration given confusion, dysphonia, and missing teeth. Raissa mech exam remarkable for hoarse vocal quality, reduced cough strength, and missing dentition. Dried/sticky secretions covering oral cavity. Thorough oral cares done w/ swab and mouthwash. Pt assessed w/ thin liquids and puree. Oral phase WFL. Pharyngeal phase remarkable for overt s/sx of aspiration w/ initial sip of thin liquids evidence by throat clearing. Pt w/ somewhat poor mentation and requiring frequent cues for redirection; solids not trialed. Recommend full liquid diet (thin consistency) w/ 1:1 supervision/assistance. Meds OK crushed in puree. Ensure pt is fully upright and alert, taking small sips/bites at a slow rate. Please hold PO if pt is demonstrating overt s/sx of aspiration or has increased O2 requirements. SLP to follow.   SLP Total Evaluation Time   Eval: oral/pharyngeal swallow function, clinical swallow Minutes (23867) 17   SLP Goals   Therapy Frequency (SLP Eval) 5 times/week   SLP Predicted Duration/Target Date for Goal Attainment 12/27/24   SLP Goals Swallow   SLP: Safely tolerate diet without signs/symptoms of aspiration Regular diet;Thin liquids;With use of swallow precautions   SLP Discharge Planning   SLP Plan diet tolerance/upgrade   SLP Discharge Recommendation Transitional Care Facility   SLP Rationale for DC Rec dysphagia   SLP Brief overview of current status  Recommend full liquid diet (thin consistency) w/ 1:1 supervision/assistance. Meds OK crushed in puree. Ensure pt is fully upright and alert, taking small sips/bites at a slow rate. Please hold PO if pt is demonstrating overt s/sx of aspiration or has increased O2 requirements. SLP to follow.   SLP Time and Intention   Total Session Time (sum of  timed and untimed services) 21

## 2024-12-01 ENCOUNTER — APPOINTMENT (OUTPATIENT)
Dept: ULTRASOUND IMAGING | Facility: CLINIC | Age: 69
End: 2024-12-01
Payer: COMMERCIAL

## 2024-12-01 LAB
ABO + RH BLD: NORMAL
ALBUMIN SERPL BCG-MCNC: 2.5 G/DL (ref 3.5–5.2)
ALP SERPL-CCNC: 92 U/L (ref 40–150)
ALT SERPL W P-5'-P-CCNC: 12 U/L (ref 0–50)
ANION GAP SERPL CALCULATED.3IONS-SCNC: 9 MMOL/L (ref 7–15)
APTT PPP: >240 SECONDS (ref 22–38)
AST SERPL W P-5'-P-CCNC: 17 U/L (ref 0–45)
BACTERIA UR CULT: NORMAL
BASE EXCESS BLDV CALC-SCNC: 2.1 MMOL/L (ref -3–3)
BILIRUB DIRECT SERPL-MCNC: <0.2 MG/DL (ref 0–0.3)
BILIRUB SERPL-MCNC: <0.2 MG/DL
BLD GP AB SCN SERPL QL: NEGATIVE
BLD PROD TYP BPU: NORMAL
BLOOD COMPONENT TYPE: NORMAL
BUN SERPL-MCNC: 33.1 MG/DL (ref 8–23)
CALCIUM SERPL-MCNC: 8.2 MG/DL (ref 8.8–10.4)
CHLORIDE SERPL-SCNC: 103 MMOL/L (ref 98–107)
CODING SYSTEM: NORMAL
CREAT SERPL-MCNC: 1.96 MG/DL (ref 0.51–0.95)
CROSSMATCH: NORMAL
EGFRCR SERPLBLD CKD-EPI 2021: 27 ML/MIN/1.73M2
ERYTHROCYTE [DISTWIDTH] IN BLOOD BY AUTOMATED COUNT: 17.2 % (ref 10–15)
ERYTHROCYTE [DISTWIDTH] IN BLOOD BY AUTOMATED COUNT: 17.3 % (ref 10–15)
GLUCOSE BLDC GLUCOMTR-MCNC: 107 MG/DL (ref 70–99)
GLUCOSE BLDC GLUCOMTR-MCNC: 109 MG/DL (ref 70–99)
GLUCOSE BLDC GLUCOMTR-MCNC: 118 MG/DL (ref 70–99)
GLUCOSE BLDC GLUCOMTR-MCNC: 123 MG/DL (ref 70–99)
GLUCOSE BLDC GLUCOMTR-MCNC: 124 MG/DL (ref 70–99)
GLUCOSE BLDC GLUCOMTR-MCNC: 138 MG/DL (ref 70–99)
GLUCOSE SERPL-MCNC: 139 MG/DL (ref 70–99)
HCO3 BLDV-SCNC: 27 MMOL/L (ref 21–28)
HCO3 SERPL-SCNC: 24 MMOL/L (ref 22–29)
HCT VFR BLD AUTO: 21.1 % (ref 35–47)
HCT VFR BLD AUTO: 23.7 % (ref 35–47)
HGB BLD-MCNC: 6.7 G/DL (ref 11.7–15.7)
HGB BLD-MCNC: 7.5 G/DL (ref 11.7–15.7)
HOLD SPECIMEN: NORMAL
ISSUE DATE AND TIME: NORMAL
MAGNESIUM SERPL-MCNC: 2.4 MG/DL (ref 1.7–2.3)
MCH RBC QN AUTO: 30.6 PG (ref 26.5–33)
MCH RBC QN AUTO: 30.9 PG (ref 26.5–33)
MCHC RBC AUTO-ENTMCNC: 31.6 G/DL (ref 31.5–36.5)
MCHC RBC AUTO-ENTMCNC: 31.8 G/DL (ref 31.5–36.5)
MCV RBC AUTO: 97 FL (ref 78–100)
MCV RBC AUTO: 97 FL (ref 78–100)
O2/TOTAL GAS SETTING VFR VENT: 28 %
OXYHGB MFR BLDV: 67 % (ref 70–75)
PCO2 BLDV: 42 MM HG (ref 40–50)
PH BLDV: 7.42 [PH] (ref 7.32–7.43)
PHOSPHATE SERPL-MCNC: 3.5 MG/DL (ref 2.5–4.5)
PLATELET # BLD AUTO: 101 10E3/UL (ref 150–450)
PLATELET # BLD AUTO: 107 10E3/UL (ref 150–450)
PO2 BLDV: 38 MM HG (ref 25–47)
POTASSIUM SERPL-SCNC: 3.8 MMOL/L (ref 3.4–5.3)
PROT SERPL-MCNC: 4.3 G/DL (ref 6.4–8.3)
RBC # BLD AUTO: 2.17 10E6/UL (ref 3.8–5.2)
RBC # BLD AUTO: 2.45 10E6/UL (ref 3.8–5.2)
SAO2 % BLDV: 68.1 % (ref 70–75)
SODIUM SERPL-SCNC: 136 MMOL/L (ref 135–145)
SPECIMEN EXP DATE BLD: NORMAL
UNIT ABO/RH: NORMAL
UNIT NUMBER: NORMAL
UNIT STATUS: NORMAL
UNIT TYPE ISBT: 5100
WBC # BLD AUTO: 7.1 10E3/UL (ref 4–11)
WBC # BLD AUTO: 8.4 10E3/UL (ref 4–11)

## 2024-12-01 PROCEDURE — 82805 BLOOD GASES W/O2 SATURATION: CPT

## 2024-12-01 PROCEDURE — 250N000013 HC RX MED GY IP 250 OP 250 PS 637

## 2024-12-01 PROCEDURE — P9047 ALBUMIN (HUMAN), 25%, 50ML: HCPCS | Mod: JZ | Performed by: NURSE PRACTITIONER

## 2024-12-01 PROCEDURE — 85018 HEMOGLOBIN: CPT

## 2024-12-01 PROCEDURE — 250N000011 HC RX IP 250 OP 636

## 2024-12-01 PROCEDURE — 93970 EXTREMITY STUDY: CPT | Mod: 26 | Performed by: RADIOLOGY

## 2024-12-01 PROCEDURE — 250N000013 HC RX MED GY IP 250 OP 250 PS 637: Performed by: INTERNAL MEDICINE

## 2024-12-01 PROCEDURE — 85027 COMPLETE CBC AUTOMATED: CPT

## 2024-12-01 PROCEDURE — 85730 THROMBOPLASTIN TIME PARTIAL: CPT

## 2024-12-01 PROCEDURE — 250N000011 HC RX IP 250 OP 636: Mod: JZ | Performed by: NURSE PRACTITIONER

## 2024-12-01 PROCEDURE — 80053 COMPREHEN METABOLIC PANEL: CPT

## 2024-12-01 PROCEDURE — 82565 ASSAY OF CREATININE: CPT

## 2024-12-01 PROCEDURE — 99291 CRITICAL CARE FIRST HOUR: CPT | Mod: GC | Performed by: INTERNAL MEDICINE

## 2024-12-01 PROCEDURE — 86900 BLOOD TYPING SEROLOGIC ABO: CPT

## 2024-12-01 PROCEDURE — 82947 ASSAY GLUCOSE BLOOD QUANT: CPT

## 2024-12-01 PROCEDURE — 82247 BILIRUBIN TOTAL: CPT

## 2024-12-01 PROCEDURE — 250N000009 HC RX 250

## 2024-12-01 PROCEDURE — 86923 COMPATIBILITY TEST ELECTRIC: CPT

## 2024-12-01 PROCEDURE — 258N000003 HC RX IP 258 OP 636: Performed by: OBSTETRICS & GYNECOLOGY

## 2024-12-01 PROCEDURE — 84100 ASSAY OF PHOSPHORUS: CPT

## 2024-12-01 PROCEDURE — 83735 ASSAY OF MAGNESIUM: CPT

## 2024-12-01 PROCEDURE — 200N000002 HC R&B ICU UMMC

## 2024-12-01 PROCEDURE — 99207 PR NOT IN PERSON INPATIENT CONSULT STATISTICAL MARKER: CPT | Performed by: NURSE PRACTITIONER

## 2024-12-01 PROCEDURE — 250N000011 HC RX IP 250 OP 636: Performed by: OBSTETRICS & GYNECOLOGY

## 2024-12-01 PROCEDURE — 84155 ASSAY OF PROTEIN SERUM: CPT

## 2024-12-01 PROCEDURE — 93970 EXTREMITY STUDY: CPT

## 2024-12-01 PROCEDURE — 999N000111 HC STATISTIC OT IP EVAL DEFER

## 2024-12-01 PROCEDURE — P9016 RBC LEUKOCYTES REDUCED: HCPCS

## 2024-12-01 RX ORDER — HEPARIN SODIUM 10000 [USP'U]/100ML
0-5000 INJECTION, SOLUTION INTRAVENOUS CONTINUOUS
Status: DISPENSED | OUTPATIENT
Start: 2024-12-01 | End: 2024-12-03

## 2024-12-01 RX ORDER — AMOXICILLIN 250 MG
2 CAPSULE ORAL 2 TIMES DAILY
Status: DISCONTINUED | OUTPATIENT
Start: 2024-12-01 | End: 2024-12-08 | Stop reason: HOSPADM

## 2024-12-01 RX ORDER — POLYETHYLENE GLYCOL 3350 17 G/17G
17 POWDER, FOR SOLUTION ORAL 2 TIMES DAILY
Status: DISCONTINUED | OUTPATIENT
Start: 2024-12-01 | End: 2024-12-08 | Stop reason: HOSPADM

## 2024-12-01 RX ORDER — QUETIAPINE FUMARATE 25 MG/1
25 TABLET, FILM COATED ORAL AT BEDTIME
Status: DISCONTINUED | OUTPATIENT
Start: 2024-12-01 | End: 2024-12-08 | Stop reason: HOSPADM

## 2024-12-01 RX ORDER — ALBUMIN (HUMAN) 12.5 G/50ML
25 SOLUTION INTRAVENOUS ONCE
Status: COMPLETED | OUTPATIENT
Start: 2024-12-01 | End: 2024-12-01

## 2024-12-01 RX ORDER — AMOXICILLIN 250 MG
1 CAPSULE ORAL 2 TIMES DAILY
Status: DISCONTINUED | OUTPATIENT
Start: 2024-12-01 | End: 2024-12-08 | Stop reason: HOSPADM

## 2024-12-01 RX ADMIN — THIAMINE HYDROCHLORIDE 250 MG: 100 INJECTION, SOLUTION INTRAMUSCULAR; INTRAVENOUS at 07:55

## 2024-12-01 RX ADMIN — SERTRALINE HYDROCHLORIDE 150 MG: 100 TABLET ORAL at 07:54

## 2024-12-01 RX ADMIN — SENNOSIDES AND DOCUSATE SODIUM 2 TABLET: 50; 8.6 TABLET ORAL at 07:55

## 2024-12-01 RX ADMIN — SENNOSIDES AND DOCUSATE SODIUM 2 TABLET: 50; 8.6 TABLET ORAL at 21:42

## 2024-12-01 RX ADMIN — HYDROCORTISONE SODIUM SUCCINATE 50 MG: 100 INJECTION, POWDER, FOR SOLUTION INTRAMUSCULAR; INTRAVENOUS at 03:53

## 2024-12-01 RX ADMIN — ERTAPENEM 500 MG: 1 INJECTION INTRAMUSCULAR; INTRAVENOUS at 04:48

## 2024-12-01 RX ADMIN — Medication 60 ML: at 07:56

## 2024-12-01 RX ADMIN — HYDROCORTISONE SODIUM SUCCINATE 50 MG: 100 INJECTION, POWDER, FOR SOLUTION INTRAMUSCULAR; INTRAVENOUS at 15:02

## 2024-12-01 RX ADMIN — Medication 1 TABLET: at 07:56

## 2024-12-01 RX ADMIN — Medication 40 MG: at 07:56

## 2024-12-01 RX ADMIN — APIXABAN 2.5 MG: 2.5 TABLET, FILM COATED ORAL at 07:56

## 2024-12-01 RX ADMIN — ACETAMINOPHEN 650 MG: 325 TABLET, FILM COATED ORAL at 12:27

## 2024-12-01 RX ADMIN — Medication 3 MG: at 21:42

## 2024-12-01 RX ADMIN — CALCIUM CARBONATE 600 MG (1,500 MG)-VITAMIN D3 400 UNIT TABLET 1 TABLET: at 18:01

## 2024-12-01 RX ADMIN — POLYETHYLENE GLYCOL 3350 17 G: 17 POWDER, FOR SOLUTION ORAL at 21:42

## 2024-12-01 RX ADMIN — POLYETHYLENE GLYCOL 3350 17 G: 17 POWDER, FOR SOLUTION ORAL at 07:55

## 2024-12-01 RX ADMIN — BENZTROPINE MESYLATE 1 MG: 0.5 TABLET ORAL at 07:56

## 2024-12-01 RX ADMIN — CYANOCOBALAMIN TAB 500 MCG 500 MCG: 500 TAB at 07:54

## 2024-12-01 RX ADMIN — OXYCODONE HYDROCHLORIDE 2.5 MG: 5 TABLET ORAL at 18:01

## 2024-12-01 RX ADMIN — NOREPINEPHRINE BITARTRATE 0.04 MCG/KG/MIN: 0.06 INJECTION, SOLUTION INTRAVENOUS at 06:30

## 2024-12-01 RX ADMIN — CALCIUM CARBONATE 600 MG (1,500 MG)-VITAMIN D3 400 UNIT TABLET 1 TABLET: at 07:56

## 2024-12-01 RX ADMIN — ACETAMINOPHEN 650 MG: 325 TABLET, FILM COATED ORAL at 21:42

## 2024-12-01 RX ADMIN — HEPARIN SODIUM 1400 UNITS/HR: 10000 INJECTION, SOLUTION INTRAVENOUS at 15:45

## 2024-12-01 RX ADMIN — ACETAMINOPHEN 650 MG: 325 TABLET, FILM COATED ORAL at 15:02

## 2024-12-01 RX ADMIN — ACETAMINOPHEN 650 MG: 325 TABLET, FILM COATED ORAL at 03:41

## 2024-12-01 RX ADMIN — ALBUMIN (HUMAN) 25 G: 0.25 INJECTION, SOLUTION INTRAVENOUS at 12:22

## 2024-12-01 ASSESSMENT — ACTIVITIES OF DAILY LIVING (ADL)
ADLS_ACUITY_SCORE: 60
ADLS_ACUITY_SCORE: 60
ADLS_ACUITY_SCORE: 62
ADLS_ACUITY_SCORE: 62
ADLS_ACUITY_SCORE: 68
ADLS_ACUITY_SCORE: 68
ADLS_ACUITY_SCORE: 62
ADLS_ACUITY_SCORE: 60
ADLS_ACUITY_SCORE: 62
ADLS_ACUITY_SCORE: 66
ADLS_ACUITY_SCORE: 60
ADLS_ACUITY_SCORE: 62
ADLS_ACUITY_SCORE: 68
ADLS_ACUITY_SCORE: 62
ADLS_ACUITY_SCORE: 60
ADLS_ACUITY_SCORE: 62
ADLS_ACUITY_SCORE: 68
ADLS_ACUITY_SCORE: 62
ADLS_ACUITY_SCORE: 62
ADLS_ACUITY_SCORE: 58
ADLS_ACUITY_SCORE: 68
ADLS_ACUITY_SCORE: 60
ADLS_ACUITY_SCORE: 60

## 2024-12-01 NOTE — PROGRESS NOTES
"Brief Progress Note    Presented to patient room to check-in. Patient is somnolent and lethargic, unresponsive to voice and squeezing of her hands. Given this is a change from this morning when GYN/ONC team saw patient, discussed with RN who states patient has been like this most of the day following administration of 400 mg of Seroquel (patient's prior home dose) at 1230.     /68   Pulse 66   Temp 98.4  F (36.9  C) (Axillary)   Resp 12   Ht 1.6 m (5' 3\")   Wt 76 kg (167 lb 8.8 oz)   SpO2 100%   BMI 29.68 kg/m    Physical Exam:  General: Lying in bed, lethargic and somnolent, non-responsive to voice or gentle hand squeeze; cachectic appearing  Pulm: Breathing non-labored on room air  CV: Regular rate  Abd: Soft, non-distended; suprapubic catheter in place  Ext: 2+ bilateral lower extremity edema    Assessment/Plan: Patient difficult to arouse this evening which is a change when compared to this morning. Believe that this is most likely due to Seroquel that patient was administered earlier this afternoon. Patient remains on Levophed at this time, however, dosage decreasing this evening. Urine output remains low, though improved when compared to day prior  with 250 ml charted over past day, will continue to monitor and defer to primary team regarding management of patient's fluid status.     Neisha Morris MD  Obstetrics and Gynecology, PGY-2  11/30/24 10:18 PM  Delayed entry due to acute patient care tasks        "

## 2024-12-01 NOTE — PROGRESS NOTES
Major Shift Events:  Somnolent after restarting PTA meds zoloft and seroquel. MICU 1 team notified and assessed pt at bedside. Serial neuro assessments per MICU 1 and no other interventions at this time. Pt still somnolent at 6pm, RN notified MICU 1 again, no additional interventions, will continue with serial neuro assessments. Pt opening eyes to pain/repositioning then quickly falls asleep. Not following commands. Not tracking fingers. Not responding to any questions. PERRL. SB/SR. TItrating levophed to meet MAP goal >65. 25g albumin administered. 2L NC. Denies SOB. Shallow breathing, RR 12-16. No BM. Continue trophic feeds, TF's 15cc/hr with standard FWFs. Anuric. Neph tube with minimal output. Up in chair with a lift for a couple hours. Speech, PT/OT consulted.   Plan: Notify primary team of any acute changes.   For vital signs and complete assessments, please see documentation flowsheets.

## 2024-12-01 NOTE — CONSULTS
Psychiatric consultation received. Chart reviewed. Discussed case with RN and attending MD. Called daughter for collateral information. Daughter states that patient had not been compliant with PTA psychotropic medications prior to admission for an unspecified amount of time.  Per chart review patient does appear to have existing diagnosis of paranoid schizophrenia. Daughter requested that patient be assessed in person followed by a discussion with her regarding psychiatric medications prior to further administration.  Daughter voiced concern regarding level of sedation following administration of Seroquel. Medications placed on hold until further assessment by psychiatry on 12/2/24.     ERINN Bill CNP on 12/1/2024 at 12:50 PM

## 2024-12-01 NOTE — PROGRESS NOTES
"Gynecology Oncology Progress Note  2024    Ms. Alis Hartman is a 69 year old HD#5 admitted for urosepsis    Dz: Serous endometrial adenocarcinoma s/p SNEHA/BSO (2024) s/p Cycle 3 carbo/taxel (10/15/24)    24 hour events:   - Oliguria   - Decreasing pressor requirements (max rate over past 24 hours 4.7mL/hr; currently 2.4mL/hour)  - SLP evaluation: recommend full liquid diet w/ 1:1 supervision/assistance  - Somnolent/lethargic following administration of 400 mg Seroquel     Subjective: Hannah is more awake this morning when compared to last evening on rounds. Wakes to voice and responds softly to answer yes/no questions. Whispers that last night was \"so, so\" but that she is currently comfortable.     Objective:   BP 94/62   Pulse 64   Temp 98  F (36.7  C) (Axillary)   Resp 10   Ht 1.6 m (5' 3\")   Wt 78 kg (171 lb 15.3 oz)   SpO2 100%   BMI 30.46 kg/m      General: cachectic-appearing female, in NAD  CV: Regular rate  Resp: Non-labored breathing on room air  Abdomen: Soft, non-tender, non-distended  Extremities: 2+ bilateral lower extremity edema   Lines/Drains: Supra-pubic cath site non-erythematous, non-tender, no drainage    I/Os  (Yesterday // Since Midnight)  IV:  416 mL //  169mL  N mL // 140 mL  Enteral TF:  120 mL // 90 mL  Urine 250 mL // 180 mL  UOp:  0.46ml/kg/hr since midnight    Drains: suprapubic catheter, port, pIV, PNT (L)    Recent Results (from the past 24 hours)   Glucose by meter    Collection Time: 24 12:02 PM   Result Value Ref Range    GLUCOSE BY METER POCT 126 (H) 70 - 99 mg/dL   EKG 12-lead, complete    Collection Time: 24  1:23 PM   Result Value Ref Range    Systolic Blood Pressure  mmHg    Diastolic Blood Pressure  mmHg    Ventricular Rate 58 BPM    Atrial Rate 58 BPM    DE Interval 144 ms    QRS Duration 82 ms     ms    QTc 441 ms    P Axis 25 degrees    R AXIS 0 degrees    T Axis 25 degrees    Interpretation ECG       Sinus bradycardia  Low " voltage QRS  Septal infarct (cited on or before 21-Jul-2024)  Abnormal ECG  When compared with ECG of 27-Nov-2024 10:27, (unconfirmed)  Questionable change in initial forces of Anteroseptal leads  ST elevation now present in Anterior leads  Nonspecific T wave abnormality no longer evident in Lateral leads     Blood gas venous    Collection Time: 11/30/24  3:55 PM   Result Value Ref Range    pH Venous 7.42 7.32 - 7.43    pCO2 Venous 43 40 - 50 mm Hg    pO2 Venous 35 25 - 47 mm Hg    Bicarbonate Venous 28 21 - 28 mmol/L    Base Excess/Deficit Venous 3.1 (H) -3.0 - 3.0 mmol/L    FIO2 2     Oxyhemoglobin Venous 57 (L) 70 - 75 %    O2 Sat, Venous 57.9 (L) 70.0 - 75.0 %   Glucose by meter    Collection Time: 11/30/24  3:57 PM   Result Value Ref Range    GLUCOSE BY METER POCT 129 (H) 70 - 99 mg/dL   Blood gas venous    Collection Time: 11/30/24  9:20 PM   Result Value Ref Range    pH Venous 7.42 7.32 - 7.43    pCO2 Venous 41 40 - 50 mm Hg    pO2 Venous 42 25 - 47 mm Hg    Bicarbonate Venous 27 21 - 28 mmol/L    Base Excess/Deficit Venous 2.0 -3.0 - 3.0 mmol/L    FIO2 21     Oxyhemoglobin Venous 71 70 - 75 %    O2 Sat, Venous 72.7 70.0 - 75.0 %   Glucose by meter    Collection Time: 11/30/24  9:20 PM   Result Value Ref Range    GLUCOSE BY METER POCT 110 (H) 70 - 99 mg/dL   Glucose by meter    Collection Time: 12/01/24 12:09 AM   Result Value Ref Range    GLUCOSE BY METER POCT 109 (H) 70 - 99 mg/dL   Basic metabolic panel    Collection Time: 12/01/24  4:02 AM   Result Value Ref Range    Sodium 136 135 - 145 mmol/L    Potassium 3.8 3.4 - 5.3 mmol/L    Chloride 103 98 - 107 mmol/L    Carbon Dioxide (CO2) 24 22 - 29 mmol/L    Anion Gap 9 7 - 15 mmol/L    Urea Nitrogen 33.1 (H) 8.0 - 23.0 mg/dL    Creatinine 1.96 (H) 0.51 - 0.95 mg/dL    GFR Estimate 27 (L) >60 mL/min/1.73m2    Calcium 8.2 (L) 8.8 - 10.4 mg/dL    Glucose 139 (H) 70 - 99 mg/dL   Magnesium    Collection Time: 12/01/24  4:02 AM   Result Value Ref Range    Magnesium  2.4 (H) 1.7 - 2.3 mg/dL   Phosphorus    Collection Time: 12/01/24  4:02 AM   Result Value Ref Range    Phosphorus 3.5 2.5 - 4.5 mg/dL   Hepatic panel    Collection Time: 12/01/24  4:02 AM   Result Value Ref Range    Protein Total 4.3 (L) 6.4 - 8.3 g/dL    Albumin 2.5 (L) 3.5 - 5.2 g/dL    Bilirubin Total <0.2 <=1.2 mg/dL    Alkaline Phosphatase 92 40 - 150 U/L    AST 17 0 - 45 U/L    ALT 12 0 - 50 U/L    Bilirubin Direct <0.20 0.00 - 0.30 mg/dL   Blood gas venous    Collection Time: 12/01/24  4:02 AM   Result Value Ref Range    pH Venous 7.42 7.32 - 7.43    pCO2 Venous 42 40 - 50 mm Hg    pO2 Venous 38 25 - 47 mm Hg    Bicarbonate Venous 27 21 - 28 mmol/L    Base Excess/Deficit Venous 2.1 -3.0 - 3.0 mmol/L    FIO2 28     Oxyhemoglobin Venous 67 (L) 70 - 75 %    O2 Sat, Venous 68.1 (L) 70.0 - 75.0 %   Glucose by meter    Collection Time: 12/01/24  4:02 AM   Result Value Ref Range    GLUCOSE BY METER POCT 138 (H) 70 - 99 mg/dL     Assessment/Plan:  Alis Hartman is a 69 year old female with 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 Patient has had waxing and waning mental status throughout admission and had been more responsive yesterday morning, however, following administration of patient's PTA Seroquel yesterday afternoon, patient has been largely somnolent and lethargic overnight. This morning, patient again responsive to voice and able to answer yes/no questions with a whisper.     # Urosepsis  # Septic shock  # Oliguria  # MADHU  # CKD stage 3a  The patient has a history of ESBL urosepsis and bacteremia. At beginning of patient's hospitalization received a few doses of Zosyn and Vancomycin, however, these were subsequently  discontinued and the patient is currently D#5 of ertapenem.  - Continues to be oliguric with evidence of MADHU given elevated creatinine (2.01 > 1.96 this morning) when compared to patient's baseline of 0.8; believe this is likely multifactorial and could be prerenal/ATN in the setting of sepsis, however, patient's urine output did improve following placement of PNT and SPT exchange making obstructive/post-renal etiology a possibility as well.   - Continue to avoid nephrotoxic medications  - Continue Ertapenam per ID recommendations.   - Pressor support needs decreasing. Max rate over past 24 hours 4.7mL/hr; currently 2.4mL/hour  - Trend CMP, CBC, mag, phos, lactic acid  - Creatinine elevated at 2.01 with a baseline of 0.8 and a GFR of 30. Will continue to trend. Unchanged since yesterday    # Serous endometrial adenocarcinoma of uterus  - S/p SNEHA, BSO (7/12/2024), cystotomy w/ suprapubic catheter, s/p Cycle 3 carbo/taxel (10/15/24).   - Continue to hold additional chemotherapy pending patient's clinical course    #Hypomagnesemia, resolved  #Hypokalemia, resolved  - Magnesium level 2.4 this morning  - Potassium level 3.8 this morning  - Continue NG tube with tube feeds  - Continue daily evaluation of electrolytes per primary team with ERP as needed     # Atrial fibrillation on eliquis  # Concern for chronotropic incompetence  - Eliquis restarted per primary team 11/29 (patient endorsed she had not taken it since 11/23 on admission)  - Per Cardiology:  - Once patient is out of ICU and has been clear of infectious process by infectious disease team, could consider placement of PPM implant  - Continue to hold patient's PTA atenolol     # Vaginal burning, itching, discharge  - Patient endorsed bothersome vaginal discharge, burning, and itching that had been ongoing prior to admission  - Wet prep, GC/Chlam collected on admission without evidence of infectious etiologies     # Chronic stable conditions  - Anemia of chronic  "disease: hemoglobin this morning remains in process at this time; has been stable at 8.3 over past 2 days  - Atrial Fibrillation: Continue to hold PTA atenolol in the setting of bradycardia and pressure support per cardiology  - Asthma: PTA albuterol   - MDD, Schizophrenia: Per primary team, continue to hold patient's TPA Seroquel of 400 mg given patient's poor response yesterday; somnolence improving this morning  - Hx/o gastric bypass - avoid NSAIDs     Code status: After discussion with patient and daughter when patient had capacity, remains full code.     Clinically Significant Risk Factors     # Obesity: Estimated body mass index is 30.46 kg/m  as calculated from the following:    Height as of this encounter: 1.6 m (5' 3\").    Weight as of this encounter: 78 kg (171 lb 15.3 oz).    # Severe Malnutrition: based on nutrition assessment      Patient discussed with Dr. Lona Morris MD  Obstetrics and Gynecology, PGY-2  12/01/24 5:23 AM          "

## 2024-12-01 NOTE — PROGRESS NOTES
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 Suspect AMS related to chronotropic incompetence and poor perfusion vs septic shock 2/2 obstructive uropathy.    CHANGES and MAJOR THINGS TODAY:   - Additional volume resuscitation with 25% albumin 25g once  - Psych consult for assistance with medication regimen; daughter notes patient was oversedated on PTA regimen  - Continue delirium precautions  - Schedule bowel regimen  - Finished stress-dose steroids  - Plan for 10-14 day course of empiric ertapenem    PLAN:  Neuro:  # Multifactorial encephalopathy  Patient initially presented to hospital with AMS. Etiology was suspected multifactorial in the setting of suspected urosepsis, hypoglycemia (blood sugar 34 on arrival), and PTA oversedation . Ongoing intermittent sedation seems to be associated with medications (previously over-sedated with PO dilaudid 1mg and PTA seroquel 400mg) with possible component of hypoactive delirium.   - Delirium precautions   Bright lights during the day, music/TV during the day, up to chair TID  - Hold sedating medications as possible   Hold PTA gabapentin 800 QID, seroquel 400 BID, zolpidem 10mg at bedtime PRN  - Treat pain with tylenol/topicals    # Chronic pain  # Suprapubic discomfort in the setting of malignancy/possible UTI  - Acetaminophen 650mg q6H  -  Diclofenac gel PRN  - Reduced PTA dose oxycodone 2.5mg q6h PRN     #Paranoid schizophrenia  Per daughter, patient has history of schizophrenia but previously did not take psychiatric medications regularly due to over-sedation. While in care facility, patient was prescribed new psychiatric medications (gabapentin 800mg QID, seroquel 400mg BID, zolpidem 10mg at bedtime) with subsequent over-sedation. Daughter notes concern for continued administration of seroquel, gabapentin, or zolpidem due to associated sedation. Psychiatry consulted for treatment recommendations  - Melatonin 3mg at bedtime  - Psychiatry consult     Pulmonary:  # Asthma   No evidence for asthma flare at this time. Breathing comfortably on RA. Imaging with mild bibasilar atelectasis.   - Incentive spirometer q2H  - PTA albuterol inhaler PRN    Cardiovascular:  # Multifactorial shock  Originally admitted to OB/GYN 11/27 with hypotension (BP 88/59), hypothermia (35.7 C) and elevated lactate (2.4) consistent with shock. Etiology suspected multifactorial, including 2/2 urosepsis (UA with leukocyte esterase/WBCs), hypovolemia, and possible chronotropic incompetence.   - Pressors   Norepi gtt, wean MAP >65  - S/p stress-dose steroids (hydrocortisone 150mg 11/27-12/01)  - Volume   Albumin, 25g of 25% (11/30, 12/1)  - Infectious workup and management, as below    # Paroxysmal afib with RVR  On PTA apixaban 2.5 BID and atenolol.   - Hold apixaban while on heparin gtt for DVT   Will discuss whether DVT represents apixaban failure with Heme/Onc  - Hold atenolol with intermittent sinus bradycardia (HR 50s)     GI/Nutrition:  # Nutrition  # Hx of Kiko en Y gastric bypass   - Nutrition consult   NGT, tube feeds at goal  - Speech consult   Cleared for FLD with 1:1 supervision, strict aspiration precautions  - Pantoprazole qd    # Cholelithiasis and mild gallbladder distention  Findings seen on CT abd. Pt denies any RUQ pain. RUQ US negative for cholecystitis.  -  Monitor symptoms    # Constipation  - Miralax BID  - Senna BID    Renal/Fluids/Electrolytes:  # Oliguric MADHU  # Persistent left hydronephrosis s/p IR percutaneous nephrostomy (11/29)  # CKD stage 3a  # Hx R hydronephrosis (resolved)  Prior to her most recent cancer surgery the patient was having significant urinary dysfunction likely secondary to history of radiation therapy. Thus at the time of hysterectomy with cystotomy decision was made to place suprapubic catheter due to her poor quality of life preoperatively. Cr 1.79 on admission, up from baseline ~0.9. CT CAP w/o evidence of nephrolithiasis but presence of persistent moderate left hydroureteronephrosis which extends down to level of the urinary bladder. Obstructive uropathy and possible septic shock contributing to MADHU. S/p IR percutaneous nephrostomy (11/29).  - Monitor BMP  - Volume resuscitation with albumin  - Monitor urinary output     # Hypokalemia, intermittent  # Hypomagnesemia, intermittent  - RN replacement protocols ordered    Endocrine:  # Hypoglycemia, resolved  Resolved with tube feeds at goal.     ID:  # Possible urosepsis   # Hx of cystotomy s/p suprapubic catheter (7/12/24) w/ recurrent urosepsis  Recent diagnosis of uterine cancer s/p hysterectomy w/ unfortunate complication of iatrogenic cystostomy. Cystostomy repaired 7/12/24 but given the challenge of the repair and damage to bladder from prior pelvic radiation, suprapubic catheter was placed. Since then, pt has had three UTIs (Klebsiella ESBL x 2 and E. Faecium and candida albicans). CT with diffuse moderate L sided hydroureteronephrosis. Urine culture with urogenital elisa but possible that ureteral stricture preventing infected urine from traveling from kidney to bladder. Definitive diagnosis of urosepsis would require urine cultures from PNT. Urology recommended IR percutaneous nephrostomy tube (placed 11/29). Repeat urine culture unrevealing. Can consider empiric 10-14 day course of  ertapenem for obstructive UTI.   - Antibiotics   Ertapenem (11/27-12/6)  - Follow urine cultures    # Vaginal burning, itching, discharge  Patient with vaginal discharge, burning, and itching for 2 weeks now. Wet prep negative for yeast, trich, BV, GC, CT.   - Can consider vaginal estrogen therapy    Hematology:    # Serous endometrial adenocarcinoma of uterus  - S/p SNEHA, BSO (7/12/2024), cystotomy w/ suprapubic catheter, s/p Cycle 3 carbo/taxel (10/15/24). Chemo currently on hold while determining clinical course.    #Anemia of chronic disease: hemoglobin 9.6, stable    # DVT   Found on bilateral upper extremity US (12/1) to have left subclavian and axillary DVT at PICC site. Can consider provoked DVT in setting of prolonged immobilization, infection, and malignancy vs apixaban failure. Will discuss long-term anticoagulation plan with hematology.  - Heparin gtt  - If stable on one pressor overnight, will consider PICC removal in AM.    Musculoskeletal:  # Deconditioning  - PT/OT consulted     # LE edema, chronic   - lymphedema consult placed     Skin:  # Decubitus ulcer  - WOC consulted     General Cares/Prophylaxis:    DVT Prophylaxis: Pneumatic Compression Devices  GI Prophylaxis: Pantoprazole qd  Restraints: mitts  Family Communication: none  Code Status: Full    Lines/tubes/drains:  - PIV x2 and port   - PICC    Disposition:  - Medical ICU    Patient seen and findings/plan discussed with medical ICU staff Dr. Da Silva.    Gloria Wen MD    Clinically Significant Risk Factors         # Hyponatremia: Lowest Na = 133 mmol/L in last 2 days, will monitor as appropriate       # Hypoalbuminemia: Lowest albumin = 1.8 g/dL at 11/27/2024  5:50 AM, will monitor as appropriate     # Thrombocytopenia: Lowest platelets = 94 in last 2 days, will monitor for bleeding  # Acute Kidney Injury, unspecified: based on a >150% or 0.3 mg/dL increase in last creatinine compared to past 90 day average, will monitor renal function  #  "Hypertension: Noted on problem list            # Obesity: Estimated body mass index is 30.46 kg/m  as calculated from the following:    Height as of this encounter: 1.6 m (5' 3\").    Weight as of this encounter: 78 kg (171 lb 15.3 oz).   # Severe Malnutrition: based on nutrition assessment      # Financial/Environmental Concerns: none                  ====================================  INTERVAL HISTORY:   Daughter at bedside notes that patient was oversedated on facility regimen (seroquel 400mg BID, gabapentin 800 QID, zolpidem 10mg at bedtime). Patient more oriented today, ongoing hoarse voice. Ongoing suprapubic discomfort.      OBJECTIVE:   1. VITAL SIGNS:   Temp:  [97.3  F (36.3  C)-98.4  F (36.9  C)] 97.8  F (36.6  C)  Pulse:  [55-82] 71  Resp:  [10-14] 12  BP: ()/(49-73) 113/65  SpO2:  [91 %-100 %] 99 %  Resp: 12  2. INTAKE/ OUTPUT:   I/O last 3 completed shifts:  In: 1715.12 [I.V.:435.12; NG/GT:740]  Out: 485 [Urine:485]    3. PHYSICAL EXAMINATION:  General: NAD, lying in bed, arousable, cachectic appearing  HEENT: normocephalic, atraumatic, dry mucous membranes  Neuro: A&Ox3, no formal CN exam completed however no gross abnormalities, moves all extremities  Pulm/Resp: breathing non-labored on RA  CV: RRR, no additional heart sounds  Abdomen: Soft, non-distended, nontender, suprapubic catheter in place  MSK: severe dependent edema in bilateral LE  Incisions/Skin: small sacral decubitus ulcer (media tab)    4. LABS:   Arterial Blood Gases   No lab results found in last 7 days.  Complete Blood Count   Recent Labs   Lab 12/01/24  0402 11/30/24  0444 11/29/24  0446 11/28/24  0206   WBC 8.4 7.9 7.8 9.4   HGB 7.5* 8.3* 8.3* 9.0*   * 94* 102* 120*     Basic Metabolic Panel  Recent Labs   Lab 12/01/24  1201 12/01/24  0752 12/01/24  0402 11/30/24  0451 11/30/24  0444 11/29/24  0502 11/29/24  0446 11/28/24  0514 11/28/24  0206   NA  --   --  136  --  133*  --  133*  --  132*   POTASSIUM  --   --  3.8  " --  3.7  --  4.1  --  4.2   CHLORIDE  --   --  103  --  101  --  101  --  99   CO2  --   --  24  --  24  --  25  --  24   BUN  --   --  33.1*  --  28.8*  --  26.8*  --  20.8   CR  --   --  1.96*  --  2.01*  --  2.01*  --  1.75*   * 118* 138*  139*   < > 137*   < > 123*   < > 133*    < > = values in this interval not displayed.     Liver Function Tests  Recent Labs   Lab 12/01/24  0402 11/27/24  0550 11/26/24  2250   AST 17  --  26   ALT 12 19 15   ALKPHOS 92 141 134   BILITOTAL <0.2 0.4 0.4   ALBUMIN 2.5* 1.8* 1.8*   INR  --  1.09  --      Coagulation Profile  Recent Labs   Lab 11/27/24  0550   INR 1.09       5. RADIOLOGY:   Recent Results (from the past 24 hours)   US Upper Extremity Venous Duplex Bilat    Narrative    ULTRASOUND UPPER EXTREMITY VENOUS DUPLEX BILATERAL 12/1/2024 8:15 AM    CLINICAL HISTORY: Edema.     COMPARISONS: None available.    REFERRING PROVIDER: WILL CAMPBELL    TECHNIQUE: Bilateral internal jugular veins evaluated with grayscale,  color Doppler, and spectral pulsed wave Doppler ultrasound. Bilateral  innominate, subclavian, and axillary veins evaluated with color  Doppler and spectral pulsed wave Doppler ultrasound. Bilateral  brachial, cephalic, and basilic veins evaluated with grayscale imaging  and compression.    FINDINGS: Right internal jugular vein is fully compressible with a  phasic waveform.    Right innominate, subclavian, and axillary veins fill [id number] in  color Doppler with phasic waveforms.    Right brachial veins are fully compressible.    Right basilic and cephalic veins were not visualized.    Left internal jugular vein is fully compressible with a phasic  waveform.    Left innominate vein fills [id number] in color Doppler with a phasic  waveform. PICC not visualized.    Left subclavian and axillary vein non occlusive filling defect along  the PICC. Flattened fast subclavian waveform. Phasic axillary  waveform.    Left brachial vein with the PICC is fully  compressible around the  PICC. The other vein is fully compressible.    Left basilic and cephalic veins were not visualized.    Patient's ICU nurse was called and notified of the result at 12:20.      Impression    IMPRESSION:  1. Left subclavian and axillary non occlusive deep venous thrombosis  around the PICC.    2. No deep venous thrombosis demonstrated in the right arm.    3. Bilateral basilic and cephalic veins were not visualized.    GAETANO CORTEZ MD         SYSTEM ID:  [id number]

## 2024-12-01 NOTE — PLAN OF CARE
ICU End of Shift Summary. See flowsheets for vital signs and detailed assessment.    Major Changes: somnolent until 2200, pt began interacting more, levo titrated down, RUE extremely edematous- PIV removed and elevated    Neuro: follows simple commands, DUENAS, pupils equal and reactive, tenderness in extremeities due to edema-scheduled tylenol, q2 neuros for sedation  Cardiac: SR 60s, afebrile-bear hugger for comfort, BP managed with levo  Respiratory: RA-2L NC- hard to get a O2 sat, lungs clear to diminished, VBGs trending  GI/: suprapubic cath with 50/q2, no BM  Skin: new pea-sized wound from under PIV on RUE, sacral wound pink- dressing changed, moisture injury under cath-barrier applied  Drips: levo 0.04-0.08  Labs: Hgb 7.5, K 3.8     Plan: Continue plan of care, RUE US?       SHOAIB Thomas  December 1, 2024        Goal Outcome Evaluation:      Plan of Care Reviewed With: patient    Overall Patient Progress: improvingOverall Patient Progress: improving    Outcome Evaluation: more responsive

## 2024-12-02 ENCOUNTER — APPOINTMENT (OUTPATIENT)
Dept: OCCUPATIONAL THERAPY | Facility: CLINIC | Age: 69
DRG: 698 | End: 2024-12-02
Payer: COMMERCIAL

## 2024-12-02 ENCOUNTER — APPOINTMENT (OUTPATIENT)
Dept: PHYSICAL THERAPY | Facility: CLINIC | Age: 69
DRG: 698 | End: 2024-12-02
Payer: COMMERCIAL

## 2024-12-02 LAB
ANION GAP SERPL CALCULATED.3IONS-SCNC: 9 MMOL/L (ref 7–15)
APTT PPP: 65 SECONDS (ref 22–38)
APTT PPP: >240 SECONDS (ref 22–38)
APTT PPP: >240 SECONDS (ref 22–38)
ATRIAL RATE - MUSE: 53 BPM
BACTERIA BLD CULT: NO GROWTH
BACTERIA BLD CULT: NO GROWTH
BUN SERPL-MCNC: 33.4 MG/DL (ref 8–23)
CALCIUM SERPL-MCNC: 7.9 MG/DL (ref 8.8–10.4)
CHLORIDE SERPL-SCNC: 105 MMOL/L (ref 98–107)
CREAT SERPL-MCNC: 1.67 MG/DL (ref 0.51–0.95)
DIASTOLIC BLOOD PRESSURE - MUSE: NORMAL MMHG
EGFRCR SERPLBLD CKD-EPI 2021: 32 ML/MIN/1.73M2
ERYTHROCYTE [DISTWIDTH] IN BLOOD BY AUTOMATED COUNT: 17 % (ref 10–15)
GLUCOSE BLDC GLUCOMTR-MCNC: 103 MG/DL (ref 70–99)
GLUCOSE BLDC GLUCOMTR-MCNC: 108 MG/DL (ref 70–99)
GLUCOSE BLDC GLUCOMTR-MCNC: 116 MG/DL (ref 70–99)
GLUCOSE BLDC GLUCOMTR-MCNC: 117 MG/DL (ref 70–99)
GLUCOSE BLDC GLUCOMTR-MCNC: 129 MG/DL (ref 70–99)
GLUCOSE BLDC GLUCOMTR-MCNC: 59 MG/DL (ref 70–99)
GLUCOSE BLDC GLUCOMTR-MCNC: 76 MG/DL (ref 70–99)
GLUCOSE BLDC GLUCOMTR-MCNC: 97 MG/DL (ref 70–99)
GLUCOSE BLDC GLUCOMTR-MCNC: 99 MG/DL (ref 70–99)
GLUCOSE SERPL-MCNC: 112 MG/DL (ref 70–99)
HCO3 SERPL-SCNC: 24 MMOL/L (ref 22–29)
HCT VFR BLD AUTO: 24.4 % (ref 35–47)
HGB BLD-MCNC: 7.9 G/DL (ref 11.7–15.7)
INTERPRETATION ECG - MUSE: NORMAL
MAGNESIUM SERPL-MCNC: 2.2 MG/DL (ref 1.7–2.3)
MCH RBC QN AUTO: 30.9 PG (ref 26.5–33)
MCHC RBC AUTO-ENTMCNC: 32.4 G/DL (ref 31.5–36.5)
MCV RBC AUTO: 95 FL (ref 78–100)
P AXIS - MUSE: 74 DEGREES
PHOSPHATE SERPL-MCNC: 2.8 MG/DL (ref 2.5–4.5)
PLAT MORPH BLD: NORMAL
PLATELET # BLD AUTO: 94 10E3/UL (ref 150–450)
POTASSIUM SERPL-SCNC: 3.3 MMOL/L (ref 3.4–5.3)
POTASSIUM SERPL-SCNC: 3.5 MMOL/L (ref 3.4–5.3)
PR INTERVAL - MUSE: 154 MS
QRS DURATION - MUSE: 70 MS
QT - MUSE: 424 MS
QTC - MUSE: 397 MS
R AXIS - MUSE: -13 DEGREES
RBC # BLD AUTO: 2.56 10E6/UL (ref 3.8–5.2)
RBC MORPH BLD: NORMAL
SODIUM SERPL-SCNC: 138 MMOL/L (ref 135–145)
SYSTOLIC BLOOD PRESSURE - MUSE: NORMAL MMHG
T AXIS - MUSE: 7 DEGREES
VENTRICULAR RATE- MUSE: 53 BPM
WBC # BLD AUTO: 6.9 10E3/UL (ref 4–11)

## 2024-12-02 PROCEDURE — 36415 COLL VENOUS BLD VENIPUNCTURE: CPT | Performed by: INTERNAL MEDICINE

## 2024-12-02 PROCEDURE — 36415 COLL VENOUS BLD VENIPUNCTURE: CPT | Performed by: OBSTETRICS & GYNECOLOGY

## 2024-12-02 PROCEDURE — 84100 ASSAY OF PHOSPHORUS: CPT

## 2024-12-02 PROCEDURE — 99233 SBSQ HOSP IP/OBS HIGH 50: CPT | Mod: GC | Performed by: INTERNAL MEDICINE

## 2024-12-02 PROCEDURE — 97530 THERAPEUTIC ACTIVITIES: CPT | Mod: GP | Performed by: PHYSICAL THERAPIST

## 2024-12-02 PROCEDURE — 85018 HEMOGLOBIN: CPT

## 2024-12-02 PROCEDURE — 99233 SBSQ HOSP IP/OBS HIGH 50: CPT | Mod: GC | Performed by: OBSTETRICS & GYNECOLOGY

## 2024-12-02 PROCEDURE — 85730 THROMBOPLASTIN TIME PARTIAL: CPT

## 2024-12-02 PROCEDURE — 85041 AUTOMATED RBC COUNT: CPT

## 2024-12-02 PROCEDURE — 250N000013 HC RX MED GY IP 250 OP 250 PS 637: Performed by: INTERNAL MEDICINE

## 2024-12-02 PROCEDURE — 97140 MANUAL THERAPY 1/> REGIONS: CPT | Mod: GO

## 2024-12-02 PROCEDURE — 258N000003 HC RX IP 258 OP 636: Performed by: OBSTETRICS & GYNECOLOGY

## 2024-12-02 PROCEDURE — 85730 THROMBOPLASTIN TIME PARTIAL: CPT | Performed by: INTERNAL MEDICINE

## 2024-12-02 PROCEDURE — 99233 SBSQ HOSP IP/OBS HIGH 50: CPT | Performed by: PSYCHIATRY & NEUROLOGY

## 2024-12-02 PROCEDURE — 83735 ASSAY OF MAGNESIUM: CPT

## 2024-12-02 PROCEDURE — 999N000248 HC STATISTIC IV INSERT WITH US BY RN

## 2024-12-02 PROCEDURE — 250N000013 HC RX MED GY IP 250 OP 250 PS 637

## 2024-12-02 PROCEDURE — 84132 ASSAY OF SERUM POTASSIUM: CPT

## 2024-12-02 PROCEDURE — 258N000001 HC RX 258

## 2024-12-02 PROCEDURE — 97535 SELF CARE MNGMENT TRAINING: CPT | Mod: GO

## 2024-12-02 PROCEDURE — 250N000011 HC RX IP 250 OP 636

## 2024-12-02 PROCEDURE — 250N000011 HC RX IP 250 OP 636: Performed by: OBSTETRICS & GYNECOLOGY

## 2024-12-02 PROCEDURE — 80048 BASIC METABOLIC PNL TOTAL CA: CPT

## 2024-12-02 PROCEDURE — P9047 ALBUMIN (HUMAN), 25%, 50ML: HCPCS | Mod: JZ

## 2024-12-02 PROCEDURE — 99418 PROLNG IP/OBS E/M EA 15 MIN: CPT | Performed by: PSYCHIATRY & NEUROLOGY

## 2024-12-02 PROCEDURE — 99232 SBSQ HOSP IP/OBS MODERATE 35: CPT | Performed by: STUDENT IN AN ORGANIZED HEALTH CARE EDUCATION/TRAINING PROGRAM

## 2024-12-02 PROCEDURE — 999N000285 HC STATISTIC VASC ACCESS LAB DRAW WITH PIV START

## 2024-12-02 PROCEDURE — 97530 THERAPEUTIC ACTIVITIES: CPT | Mod: GO

## 2024-12-02 PROCEDURE — 120N000003 HC R&B IMCU UMMC

## 2024-12-02 PROCEDURE — 85730 THROMBOPLASTIN TIME PARTIAL: CPT | Performed by: OBSTETRICS & GYNECOLOGY

## 2024-12-02 RX ORDER — ERTAPENEM 1 G/1
1 INJECTION, POWDER, LYOPHILIZED, FOR SOLUTION INTRAMUSCULAR; INTRAVENOUS EVERY 24 HOURS
Status: COMPLETED | OUTPATIENT
Start: 2024-12-03 | End: 2024-12-06

## 2024-12-02 RX ORDER — ALBUMIN (HUMAN) 12.5 G/50ML
12.5 SOLUTION INTRAVENOUS ONCE
Status: COMPLETED | OUTPATIENT
Start: 2024-12-02 | End: 2024-12-02

## 2024-12-02 RX ORDER — POTASSIUM CHLORIDE 1.5 G/1.58G
40 POWDER, FOR SOLUTION ORAL ONCE
Status: COMPLETED | OUTPATIENT
Start: 2024-12-02 | End: 2024-12-02

## 2024-12-02 RX ORDER — ERTAPENEM 1 G/1
1 INJECTION, POWDER, LYOPHILIZED, FOR SOLUTION INTRAMUSCULAR; INTRAVENOUS EVERY 24 HOURS
Status: DISCONTINUED | OUTPATIENT
Start: 2024-12-03 | End: 2024-12-02

## 2024-12-02 RX ORDER — POTASSIUM CHLORIDE 1.5 G/1.58G
20 POWDER, FOR SOLUTION ORAL ONCE
Status: COMPLETED | OUTPATIENT
Start: 2024-12-02 | End: 2024-12-02

## 2024-12-02 RX ADMIN — ACETAMINOPHEN 650 MG: 325 TABLET, FILM COATED ORAL at 15:58

## 2024-12-02 RX ADMIN — DEXTROSE 15 G: 15 GEL ORAL at 21:44

## 2024-12-02 RX ADMIN — DEXTROSE MONOHYDRATE 25 ML: 25 INJECTION, SOLUTION INTRAVENOUS at 22:13

## 2024-12-02 RX ADMIN — Medication 3 MG: at 21:37

## 2024-12-02 RX ADMIN — Medication 40 MG: at 08:54

## 2024-12-02 RX ADMIN — THIAMINE HYDROCHLORIDE 250 MG: 100 INJECTION, SOLUTION INTRAMUSCULAR; INTRAVENOUS at 08:53

## 2024-12-02 RX ADMIN — ALBUMIN HUMAN 12.5 G: 0.25 SOLUTION INTRAVENOUS at 10:36

## 2024-12-02 RX ADMIN — POTASSIUM CHLORIDE 20 MEQ: 1.5 POWDER, FOR SOLUTION ORAL at 15:58

## 2024-12-02 RX ADMIN — CYANOCOBALAMIN TAB 500 MCG 500 MCG: 500 TAB at 08:53

## 2024-12-02 RX ADMIN — ACETAMINOPHEN 650 MG: 325 TABLET, FILM COATED ORAL at 08:53

## 2024-12-02 RX ADMIN — POTASSIUM CHLORIDE 40 MEQ: 1.5 POWDER, FOR SOLUTION ORAL at 06:58

## 2024-12-02 RX ADMIN — Medication 1 TABLET: at 08:54

## 2024-12-02 RX ADMIN — CALCIUM CARBONATE 600 MG (1,500 MG)-VITAMIN D3 400 UNIT TABLET 1 TABLET: at 17:53

## 2024-12-02 RX ADMIN — OXYCODONE HYDROCHLORIDE 2.5 MG: 5 TABLET ORAL at 04:52

## 2024-12-02 RX ADMIN — HEPARIN SODIUM 800 UNITS/HR: 10000 INJECTION, SOLUTION INTRAVENOUS at 13:51

## 2024-12-02 RX ADMIN — CALCIUM CARBONATE 600 MG (1,500 MG)-VITAMIN D3 400 UNIT TABLET 1 TABLET: at 08:54

## 2024-12-02 RX ADMIN — OXYCODONE HYDROCHLORIDE 2.5 MG: 5 TABLET ORAL at 12:23

## 2024-12-02 RX ADMIN — ERTAPENEM 500 MG: 1 INJECTION INTRAMUSCULAR; INTRAVENOUS at 04:52

## 2024-12-02 RX ADMIN — OXYCODONE HYDROCHLORIDE 2.5 MG: 5 TABLET ORAL at 22:20

## 2024-12-02 RX ADMIN — ACETAMINOPHEN 650 MG: 325 TABLET, FILM COATED ORAL at 04:52

## 2024-12-02 RX ADMIN — ACETAMINOPHEN 650 MG: 325 TABLET, FILM COATED ORAL at 21:37

## 2024-12-02 RX ADMIN — Medication 60 ML: at 12:23

## 2024-12-02 ASSESSMENT — ACTIVITIES OF DAILY LIVING (ADL)
ADLS_ACUITY_SCORE: 68
ADLS_ACUITY_SCORE: 72
ADLS_ACUITY_SCORE: 68

## 2024-12-02 NOTE — PLAN OF CARE
Major Shift Events:      Pt alert to self. Confused otherwise, but appropriate. Becoming more alert, otherwise lethargic. Garbled quiet speech. Prn oxy as requested by pt. SR 70-80s. Remains afebrile. Remains on room air. NG remains in place. Advancing tube feeds to goal. SFWF. Full liquid diet, poor appetite. Multiple loose BM today. Voiding via suprapubic, red/pink urine, and L neph tube with collection bag, red.     Plan: continue plan of care.     For vital signs and complete assessments, please see documentation flowsheets.        Goal Outcome Evaluation:      Plan of Care Reviewed With: patient    Overall Patient Progress: improvingOverall Patient Progress: improving    Outcome Evaluation: off pressors. PICC to be taken out. becoming more alert, able to make needs known.

## 2024-12-02 NOTE — PROGRESS NOTES
"Gynecology Oncology Progress Note  12/02/2024    Ms. 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 Awake and oriented. Currently overall comfortable, but notes continued pain supra pubically.  She denies any bleeding. No other concerns this am and would like to rest.     Objective:   /59   Pulse 81   Temp 98.9  F (37.2  C) (Axillary)   Resp 16   Ht 1.6 m (5' 3\")   Wt 78 kg (171 lb 15.3 oz)   SpO2 100%   BMI 30.46 kg/m      General: cachectic-appearing female, in NAD, NG tube in place  CV: Regular rate  Resp: Non-labored breathing on room air  Abdomen: Soft, moderately tender in the supra-pubic area around catheter, non-distended  Extremities: 2+ bilateral lower extremity edema; Left arm minimally swollen   Lines/Drains: Supra-pubic catheter site non-erythematous and suprapubic catheter is draining orange colored urine. L PNT with minimal bloody appearing urine     Drains: suprapubic catheter, port, pIV, PNT (L)    Recent Results (from the past 24 hours)   Glucose by meter    Collection Time: 12/01/24 12:01 PM   Result Value Ref Range    GLUCOSE BY METER POCT 124 (H) 70 - 99 mg/dL   CBC with platelets    Collection Time: 12/01/24  3:25 PM   Result Value Ref Range    WBC Count 7.1 4.0 - 11.0 10e3/uL    RBC Count 2.17 (L) 3.80 - 5.20 10e6/uL    Hemoglobin 6.7 (LL) 11.7 - 15.7 g/dL    Hematocrit 21.1 (L) 35.0 - 47.0 %    MCV 97 78 - 100 fL    MCH 30.9 26.5 - 33.0 pg    MCHC 31.8 31.5 - 36.5 g/dL    RDW 17.2 (H) 10.0 - 15.0 %    Platelet Count 101 (L) 150 - 450 " 10e3/uL   Glucose by meter    Collection Time: 12/01/24  3:29 PM   Result Value Ref Range    GLUCOSE BY METER POCT 123 (H) 70 - 99 mg/dL   Prepare red blood cells (unit)    Collection Time: 12/01/24  4:12 PM   Result Value Ref Range    Blood Component Type Red Blood Cells     Product Code [id number]     Unit Status Transfused     Unit Number [id number]     CROSSMATCH Compatible     CODING SYSTEM [id number]     ISSUE DATE AND TIME [id number]     UNIT ABO/RH O+     UNIT TYPE ISBT 5100    Adult Type and Screen    Collection Time: 12/01/24  4:44 PM   Result Value Ref Range    ABO/RH(D) O POS     Antibody Screen Negative Negative    SPECIMEN EXPIRATION DATE [id number]    Glucose by meter    Collection Time: 12/01/24  9:30 PM   Result Value Ref Range    GLUCOSE BY METER POCT 107 (H) 70 - 99 mg/dL   Extra Green Top (Lithium Heparin) Tube    Collection Time: 12/01/24  9:42 PM   Result Value Ref Range    Hold Specimen JIC    Partial thromboplastin time    Collection Time: 12/01/24  9:49 PM   Result Value Ref Range    aPTT >240 (HH) 22 - 38 Seconds   CBC with platelets    Collection Time: 12/02/24  5:02 AM   Result Value Ref Range    WBC Count 6.9 4.0 - 11.0 10e3/uL    RBC Count 2.56 (L) 3.80 - 5.20 10e6/uL    Hemoglobin 7.9 (L) 11.7 - 15.7 g/dL    Hematocrit 24.4 (L) 35.0 - 47.0 %    MCV 95 78 - 100 fL    MCH 30.9 26.5 - 33.0 pg    MCHC 32.4 31.5 - 36.5 g/dL    RDW 17.0 (H) 10.0 - 15.0 %    Platelet Count 94 (L) 150 - 450 10e3/uL   Basic metabolic panel    Collection Time: 12/02/24  5:02 AM   Result Value Ref Range    Sodium 138 135 - 145 mmol/L    Potassium 3.3 (L) 3.4 - 5.3 mmol/L    Chloride 105 98 - 107 mmol/L    Carbon Dioxide (CO2) 24 22 - 29 mmol/L    Anion Gap 9 7 - 15 mmol/L    Urea Nitrogen 33.4 (H) 8.0 - 23.0 mg/dL    Creatinine 1.67 (H) 0.51 - 0.95 mg/dL    GFR Estimate 32 (L) >60 mL/min/1.73m2    Calcium 7.9 (L) 8.8 - 10.4 mg/dL    Glucose 112 (H) 70 - 99 mg/dL   Magnesium    Collection Time: 12/02/24   5:02 AM   Result Value Ref Range    Magnesium 2.2 1.7 - 2.3 mg/dL   Phosphorus    Collection Time: 12/02/24  5:02 AM   Result Value Ref Range    Phosphorus 2.8 2.5 - 4.5 mg/dL   Glucose by meter    Collection Time: 12/02/24  5:02 AM   Result Value Ref Range    GLUCOSE BY METER POCT 76 70 - 99 mg/dL   RBC and Platelet Morphology    Collection Time: 12/02/24  5:02 AM   Result Value Ref Range    RBC Morphology Confirmed RBC Indices     Platelet Assessment  Automated Count Confirmed. Platelet morphology is normal.     Automated Count Confirmed. Platelet morphology is normal.   Partial thromboplastin time    Collection Time: 12/02/24  6:07 AM   Result Value Ref Range    aPTT >240 (HH) 22 - 38 Seconds     Assessment/Plan:  lAis Hartman is a 69 year old female with serous endometrial adenocarcinoma of uterus, currently undergoing chemotherapy with carboplatin/paclitaxel who was admitted with mental status changes attributed to UTI.      # Urosepsis  # Septic shock, resolved  # Oliguria, Improving   # MADHU, improving  # CKD stage 3a  -While no cultures have grown anything to date this admission, given her previous admissions and ESBL, ID recommends completing a 10 day course of ertepenem.  -It is possible that at least part of the instigating event was poor flow through her left urinary system as she does seem to be improving after her PNT was placed  -Cr is slowing improving as is UOP.  - Continue to avoid nephrotoxic medications  - Continue Ertapenam per ID recommendations. Per ID, no need to prophylaxis on discharge.    # L subclavian/axillary non occlusive DVT around PICC  -Plan transition to PO anticoagulation today and d/c heparin drip pending Heme evaluation as she developed new clot while on Eliquis.  -Plan to remove PICC as pt has port  - Heme Onc consulted by ICU team to discuss anticoagulation plan      # Serous endometrial adenocarcinoma of uterus  - Continue to hold additional chemotherapy pending  "patient's clinical course; Will discuss with primary Gyn/Onc as further treatment has the potential to cause more morbidity and we will discuss if she should stop all further chemotherapy.    #Severe Malnutrition  #Hypokalemia  -Replace electrolytes PRN  - Continue NG tube with tube feeds  -Encourage PO intake     # Atrial fibrillation on eliquis (held)  # Concern for chronotropic incompetence  - Holding PTA atenolol in the setting of bradycardia and hypotension  -Per Cardiology:  - Once patient is out of ICU and has been clear of infectious process by infectious disease team, could consider placement of PPM implant  - Continue to hold patient's PTA atenolol      #MDD/Schizophrenia  #[place] Mental Status  #C/f Delirium   - Hold PTA Seroquel of 400 mg given patient seems to become more delirious following administration  - Psych consulted; will plan formal consult today     # Chronic stable conditions  - Anemia of chronic disease: Stable, transfuse to Hb >7  - Asthma: PTA albuterol   - Hx/o gastric bypass - avoid NSAIDs  - Stage 2 Sacral/Coccygeal Pressure Ulcer- WOC consulted; appreciate cares.      Code status: After discussion with patient and daughter when patient had capacity, remains full code.     Clinically Significant Risk Factors     # Obesity: Estimated body mass index is 30.46 kg/m  as calculated from the following:    Height as of this encounter: 1.6 m (5' 3\").    Weight as of this encounter: 78 kg (171 lb 15.3 oz).    # Severe Malnutrition: based on nutrition assessment      Dispo: TCU, however pt is declining.  Will transfer to floor today    Patient discussed with Dr. Jon Chua MD  Obstetrics and Gynecology, PGY 4  [facility]        I, Corby Webb MD personally examined and evaluated this patient on 12/02/24.  I discussed the patient with the resident and care team, and agree with the assessment and plan of care as documented in the residents note " above.    I personally reviewed vital signs, laboratory values and imaging results.        Lilliam Webb MD  Gynecologic Oncology  [facility]

## 2024-12-02 NOTE — PROGRESS NOTES
Care Management Follow Up    Length of Stay (days): 5    Expected Discharge Date:  TBD     Concerns to be Addressed: all concerns addressed in this encounter     Patient plan of care discussed at interdisciplinary rounds: Yes    Anticipated Discharge Disposition:  TCU recommended     Anticipated Discharge Services:  n/a  Anticipated Discharge DME:  n/a    Patient/family educated on Medicare website which has current facility and service quality ratings:  yes  Education Provided on the Discharge Plan:  yes  Patient/Family in Agreement with the Plan:  family in agreement    Referrals Placed by CM/SW:  none at this time, daughter was provided with TCU list  Private pay costs discussed: Not applicable    Discussed  Partnership in Safe Discharge Planning  document with patient/family: No     Handoff Completed: No, handoff not indicated or clinically appropriate    Additional Information:  SW following for discharge planning and support. Pt discussed in IDT rounds; chart reviewed. PT and OT are recommending TCU upon discharge. Pt is confused (alert to self only) so discharge discussion was deferred to pt's daughter, Joy. SW spoke to pt's daughter Joy on the phone; SW introduced self and role of CM. SW explained recommendation, at this time, for TCU upon discharge. Pt's dtr is in agreement with this plan, though notes that she thinks the pt may refuse to go to a TCU and would prefer to go home. Dtr states she feels pt would benefit from TCU and is hopeful pt will consider this.Dtr states pt has been to a TCU in the past (cannot recall the name) and felt her mother was over-medicated so she would like a different TCU. BANDAR provided list of Medicare-rated facilities to dtr via email ([email or web address]), with facilities near dtr's [place] [id number] ([facility], [facility], or [place]).    Dtr denies any other questions or needs from BANDAR at this time.     Next Steps: Dtr will review  list of TCUs and provide SW with preferences. Anticipate discharge pending IDT recommendations.      SW/RNCC will continue to follow to offer support and resources needed through to discharge.        Dede Cota, [facility]  Adult Acute Care   ICU Coverage  Ph: [phone]  Available via [employer]

## 2024-12-02 NOTE — PROGRESS NOTES
MEDICAL ICU PROGRESS NOTE  12/02/2024      Date of Service (when I saw the patient): 12/02/2024    ASSESSMENT: Alis Hartman is a 69 year old female with PMH  of gastric bypass, serous endometrial adenocarcinoma of uterus s/p SNEHA, BSO (7/12/2024) s/p Cycle 3 carbo/taxel (10/15/24), cystotomy s/p suprapubic catheter w/ hx of recurrent ESBL (Klebsiella) urosepsis & bacteremia, A-fib on eliquis, HTN, CKD stage 3a, and anemia of chronic disease  who was admitted on 11/26/2024, transferred to gyn/onc service 11/27 for urosepsis and obstructive uropathy in setting of previous radiation treatments, transferred to the ICU two hours later for septic shock requiring pressors. Suspect AMS related to hypovolemia and poor perfusion with possible septic shock 2/2 obstructive uropathy.    CHANGES and MAJOR THINGS TODAY:   - Additional volume resuscitation with 25% albumin 25g  - Psych consult for assistance with medication regimen; daughter notes patient was oversedated on PTA regimen  - Continue delirium precautions  - Plan for 10 day course of empiric ertapenem  - start full liquid diet with 1:1   - off pressors since 2200 on 12/1  - stop pantoprazole  - PICC removal   - transfer to medicine    PLAN:  Neuro:  # Multifactorial encephalopathy  Patient initially presented to hospital with AMS. Etiology was suspected multifactorial in the setting of suspected urosepsis, hypoglycemia (blood sugar 34 on arrival), and PTA oversedation . Ongoing intermittent sedation seems to be associated with medications (previously over-sedated with PO dilaudid 1mg and PTA seroquel 400mg) with possible component of hypoactive delirium.   - Delirium precautions   Bright lights during the day, music/TV during the day, up to chair TID  - Hold sedating medications as possible    Hold PTA gabapentin 800 QID, seroquel 400 BID, zolpidem 10mg at bedtime PRN   Appreciate Psychiatry recommendations  - Treat pain with tylenol/topicals    # Chronic  pain  # Suprapubic discomfort in the setting of malignancy/possible UTI  - Acetaminophen 650mg q6H  - Diclofenac gel PRN  - Reduced PTA dose oxycodone 2.5mg q6h PRN     #Paranoid schizophrenia  Per daughter, patient has history of schizophrenia but previously did not take psychiatric medications regularly due to over-sedation. While in care facility, patient was prescribed new psychiatric medications (gabapentin 800mg QID, seroquel 400mg BID, zolpidem 10mg at bedtime) with subsequent over-sedation. Daughter notes concern for continued administration of seroquel, gabapentin, or zolpidem due to associated sedation. Psychiatry consulted for treatment recommendations  - Melatonin 3mg at bedtime  -Follow up Psychiatry recommendations     Pulmonary:  # Asthma   No evidence for asthma flare at this time. Breathing comfortably on RA. Imaging with mild bibasilar atelectasis.   - Incentive spirometer q2H  - PTA albuterol inhaler PRN    Cardiovascular:  # Multifactorial shock  Originally admitted to OB/GYN 11/27 with hypotension (BP 88/59), hypothermia (35.7 C) and elevated lactate (2.4) consistent with shock. Etiology suspected multifactorial, including 2/2 urosepsis (UA with leukocyte esterase/WBCs), hypovolemia, and possible chronotropic incompetence. Hypotension improved with volume resuscitation.  - Pressors   Off Norepi gtt since 2200 on 12/1  - S/p stress-dose steroids (hydrocortisone 150mg 11/27-12/01)  - Volume   Albumin, 25g of 25% (11/30, 12/1, 12/2)  - Infectious workup and management, as below    # Paroxysmal afib with RVR  On PTA apixaban 2.5 BID and atenolol. Patient's apixaban was held for 3 days (11/27 - 29)while awaiting possible urological procedure, do not think this DVT constitutes failure of anticoagulation .  - Hold apixaban while on heparin gtt for DVT   - Hold atenolol with intermittent sinus bradycardia (HR 50s)     GI/Nutrition:  # Nutrition  # Hx of Kiko en Y gastric bypass   - Nutrition  consult   NGT, tube feeds at goal  - Speech consult   Cleared for FLD with 1:1 supervision, strict aspiration precautions  - Pantoprazole qd    # Cholelithiasis and mild gallbladder distention  Findings seen on CT abd. Pt denies any RUQ pain. RUQ US negative for cholecystitis.  - Monitor symptoms    # Constipation  - Miralax BID  - Senna BID    Renal/Fluids/Electrolytes:  # Oliguric MADHU  # Persistent left hydronephrosis s/p IR percutaneous nephrostomy (11/29)  # CKD stage 3a  # Hx R hydronephrosis (resolved)  Prior to her most recent cancer surgery the patient was having significant urinary dysfunction likely secondary to history of radiation therapy. Thus at the time of hysterectomy with cystotomy decision was made to place suprapubic catheter due to her poor quality of life preoperatively. Cr 1.79 on admission, up from baseline ~0.9. CT CAP w/o evidence of nephrolithiasis but presence of persistent moderate left hydroureteronephrosis which extends down to level of the urinary bladder. Obstructive uropathy and possible septic shock contributing to MADHU. S/p IR percutaneous nephrostomy (11/29), and urine output improving (600ml on 12/1).  - Monitor BMP  - Volume resuscitation with albumin 25% 25g   - Monitor urinary output     # Hypokalemia, intermittent  # Hypomagnesemia, intermittent  - RN replacement protocols ordered    Endocrine:  # Hypoglycemia, resolved  Resolved with tube feeds at goal.     ID:  # Possible urosepsis   # Hx of cystotomy s/p suprapubic catheter (7/12/24) w/ recurrent urosepsis  Recent diagnosis of uterine cancer s/p hysterectomy w/ unfortunate complication of iatrogenic cystostomy. Cystostomy repaired 7/12/24 but given the challenge of the repair and damage to bladder from prior pelvic radiation, suprapubic catheter was placed. Since then, pt has had three UTIs (Klebsiella ESBL x 2 and E. Faecium and candida albicans). CT with diffuse moderate L sided hydroureteronephrosis. Urine culture with  urogenital elisa but possible that ureteral stricture preventing infected urine from traveling from kidney to bladder. Definitive diagnosis of urosepsis would require urine cultures from PNT. Urology recommended IR percutaneous nephrostomy tube (placed 11/29). Nephrostomy culture only growing small amount of urogenital elisa. Can consider empiric 10 day course of ertapenem for obstructive UTI.   - Antibiotics   Ertapenem (11/27-12/6 for 10d course)  - monitor urine output  - consider repeat abdominal US if urine output decreases    # Vaginal burning, itching, discharge  Patient with vaginal discharge, burning, and itching for 2 weeks now. Wet prep negative for yeast, trich, BV, GC, CT.   - Can consider vaginal estrogen therapy    Hematology:    # Serous endometrial adenocarcinoma of uterus  - S/p SNEHA, BSO (7/12/2024), cystotomy w/ suprapubic catheter, s/p Cycle 3 carbo/taxel (10/15/24). Chemo currently on hold while determining clinical course.    #Anemia of chronic disease: hemoglobin 9.6 on admission. Required 1u PRBC on 12/1, no clear source of bleeding. Suspect dilutional combined with poor BM function.  - daily CBC    # DVT   Found on bilateral upper extremity US (12/1) to have left subclavian and axillary DVT at PICC site. Can consider provoked DVT in setting of prolonged immobilization, infection, and malignancy vs apixaban failure. Will discuss long-term anticoagulation plan with hematology.  - Heparin gtt  - remove PICC    Musculoskeletal:  # Deconditioning  - PT/OT consulted     # LE edema, chronic   - lymphedema consult placed     Skin:  # Decubitus ulcer  - WOC consulted     General Cares/Prophylaxis:    DVT Prophylaxis: Pneumatic Compression Devices  GI Prophylaxis: none  Restraints: mitts  Family Communication: none  Code Status: Full    Lines/tubes/drains:  - PIV x2 and port     Disposition:  - transfer to medicine    Patient seen and findings/plan discussed with medical ICU staff Dr. Street.    Anali  "MD Rios    Clinically Significant Risk Factors        # Hypokalemia: Lowest K = 3.3 mmol/L in last 2 days, will replace as needed        # Hypoalbuminemia: Lowest albumin = 1.8 g/dL at 11/27/2024  5:50 AM, will monitor as appropriate     # Thrombocytopenia: Lowest platelets = 94 in last 2 days, will monitor for bleeding   # Hypertension: Noted on problem list     # Acute Hypoxic Respiratory Failure: Documented O2 saturation < 90%. Continue supplemental oxygen as needed         # Obesity: Estimated body mass index is 30.46 kg/m  as calculated from the following:    Height as of this encounter: 1.6 m (5' 3\").    Weight as of this encounter: 78 kg (171 lb 15.3 oz).   # Severe Malnutrition: based on nutrition assessment      # Financial/Environmental Concerns: none                  ====================================  INTERVAL HISTORY:   Daughter at bedside notes on 12/1 that patient was oversedated on facility regimen (seroquel 400mg BID, gabapentin 800 QID, zolpidem 10mg at bedtime). Today, patient is oriented and requesting home pain medications. Endorses abdominal pain.      OBJECTIVE:   1. VITAL SIGNS:   Temp:  [97.3  F (36.3  C)-98.9  F (37.2  C)] 98.9  F (37.2  C)  Pulse:  [65-82] 77  Resp:  [12-16] 16  BP: ()/() 93/51  SpO2:  [78 %-100 %] 94 %  Resp: 16  2. INTAKE/ OUTPUT:   I/O last 3 completed shifts:  In: 1921.28 [I.V.:351.28; NG/GT:685]  Out: 610 [Urine:610]    3. PHYSICAL EXAMINATION:  General: NAD, lying in bed, arousable, cachectic appearing  HEENT: normocephalic, atraumatic, moist mucous membranes  Neuro: A&Ox3, no formal CN exam completed however no gross abnormalities, moves all extremities  Pulm/Resp: breathing non-labored 2L NC, clear to auscultation bilaterally  CV: RRR, no additional heart sounds  Abdomen: Soft, non-distended, mild diffuse tenderness, suprapubic catheter in place  MSK: severe edema in bilateral LE  Incisions/Skin: small healing sacral decubitus ulcer (media " tab)    4. LABS:   Arterial Blood Gases   No lab results found in last 7 days.  Complete Blood Count   Recent Labs   Lab 12/02/24  0502 12/01/24  1525 12/01/24  0402 11/30/24  0444   WBC 6.9 7.1 8.4 7.9   HGB 7.9* 6.7* 7.5* 8.3*   PLT 94* 101* 107* 94*     Basic Metabolic Panel  Recent Labs   Lab 12/02/24  0502 12/01/24  2130 12/01/24  1529 12/01/24  0752 12/01/24  0402 11/30/24  0451 11/30/24  0444 11/29/24  0502 11/29/24  0446     --   --   --  136  --  133*  --  133*   POTASSIUM 3.3*  --   --   --  3.8  --  3.7  --  4.1   CHLORIDE 105  --   --   --  103  --  101  --  101   CO2 24  --   --   --  24  --  24  --  25   BUN 33.4*  --   --   --  33.1*  --  28.8*  --  26.8*   CR 1.67*  --   --   --  1.96*  --  2.01*  --  2.01*   GLC 76  112* 107* 123*   < > 138*  139*   < > 137*   < > 123*    < > = values in this interval not displayed.     Liver Function Tests  Recent Labs   Lab 12/01/24  0402 11/27/24  0550 11/26/24  2250   AST 17  --  26   ALT 12 19 15   ALKPHOS 92 141 134   BILITOTAL <0.2 0.4 0.4   ALBUMIN 2.5* 1.8* 1.8*   INR  --  1.09  --      Coagulation Profile  Recent Labs   Lab 12/02/24  0607 12/01/24  2149 11/27/24  0550   INR  --   --  1.09   PTT >240* >240*  --        5. RADIOLOGY:   Recent Results (from the past 24 hours)   US Upper Extremity Venous Duplex Bilat    Narrative    ULTRASOUND UPPER EXTREMITY VENOUS DUPLEX BILATERAL 12/1/2024 8:15 AM    CLINICAL HISTORY: Edema.     COMPARISONS: None available.    REFERRING PROVIDER: WILL CAMPBELL    TECHNIQUE: Bilateral internal jugular veins evaluated with grayscale,  color Doppler, and spectral pulsed wave Doppler ultrasound. Bilateral  innominate, subclavian, and axillary veins evaluated with color  Doppler and spectral pulsed wave Doppler ultrasound. Bilateral  brachial, cephalic, and basilic veins evaluated with grayscale imaging  and compression.    FINDINGS: Right internal jugular vein is fully compressible with a  phasic waveform.    Right  innominate, subclavian, and axillary veins fill [id number] in  color Doppler with phasic waveforms.    Right brachial veins are fully compressible.    Right basilic and cephalic veins were not visualized.    Left internal jugular vein is fully compressible with a phasic  waveform.    Left innominate vein fills [id number] in color Doppler with a phasic  waveform. PICC not visualized.    Left subclavian and axillary vein non occlusive filling defect along  the PICC. Flattened fast subclavian waveform. Phasic axillary  waveform.    Left brachial vein with the PICC is fully compressible around the  PICC. The other vein is fully compressible.    Left basilic and cephalic veins were not visualized.    Patient's ICU nurse was called and notified of the result at 12:20.      Impression    IMPRESSION:  1. Left subclavian and axillary non occlusive deep venous thrombosis  around the PICC.    2. No deep venous thrombosis demonstrated in the right arm.    3. Bilateral basilic and cephalic veins were not visualized.    GAETANO CORTEZ MD         SYSTEM ID:  [id number]

## 2024-12-02 NOTE — PLAN OF CARE
Goal Outcome Evaluation:      Plan of Care Reviewed With: patient    Overall Patient Progress: improvingOverall Patient Progress: improving    Outcome Evaluation: more alert. pressors off all night.

## 2024-12-02 NOTE — PLAN OF CARE
ICU End of Shift Summary. See flowsheets for vital signs and detailed assessment.    Changes this shift: Remains oriented to self and place. Answering more. Pressors off all night, MAPs >65. TF continue to be advanced. Multiple large BM's. Hep gtt for non-occlusive DVT's. Nephrostomy tube remain dark red, no clots noted. K replaced.      Plan: notify team for changes.      Goal Outcome Evaluation: more alert. pressors off all night.       Plan of Care Reviewed With: patient    Overall Patient Progress: improvingOverall Patient Progress: improving

## 2024-12-02 NOTE — PROGRESS NOTES
Brief progress note    At bedside for pm rounds. Patient reports doing ok. Whispering and states throat is hoarse. Shakes her head 'no' when asked if she was in pain. Patient requested water and a throat lozenge. On 0.01 of levophed at this time, pressures are staying stable and MAP is appropriate. Appreciate cares per MICU team.    Ashwin Mathis MD  Gynecologic-Oncology, PGY-2   12/01/2024 10:02 PM    Gyn-Onc pager: [phone]

## 2024-12-02 NOTE — PLAN OF CARE
Goal Outcome Evaluation:      Plan of Care Reviewed With: patient    Overall Patient Progress: improvingOverall Patient Progress: improving    Outcome Evaluation: Alert. Disoriented to time and situation. STarted to make needs known to bedside RN. Pain meds adjusted per family request. Heparin gtt initiated. TF's ordered to goal rate. Bowel regimen increased. Levophed gtt to maintain MAP >65

## 2024-12-02 NOTE — PROGRESS NOTES
Major Shift Events:  RASS 0 to +1. Disoriented to time and situation. Soft voice, mumbling. Not always responding/answering questions. Titrating levophed to meet MAP goal >65. Hypovolemic, albumin administered. RA, shallow breaths and RR 12-14. TF's advanced to goal rate. Bowel regimen increased, smear BM. Passing flatus. Oliguric, suprapubic catheter. Skin tear on R wrist covered with Vaseline gauze. Weaping skin. Up in chair for a couple hours today. Hgb 6.7, 1URBCs administered. Non occlusive DVTs in bilateral upper extremities, see results page for US results. Heparin gtt initiated. Nephrostomy tube with dark red drainage, MICU 1 team made aware, no interventions, requested to be notified if drainage has clots or purulent drainage.    Plan: Recheck Hgb with AM labs. Wean pressors as able. Notify primary team of any acute changes.    For vital signs and complete assessments, please see documentation flowsheets.

## 2024-12-02 NOTE — DISCHARGE SUMMARY
Gynecologic Oncology Discharge Summary    Alis Hartman  [id number]    Admit Date: 11/26/2024  Discharge Date: ***  Admitting Provider: Dr. Santos Zamorano MD  Discharge Provider: ***    Admission Dx:   -***serous endometrial adenocarcinoma of uterus   - cystotomy s/p suprapubic catheter w/ hx of recurrent ESBL   - Possible urosepsis  - Multifactorial shock  - HTN  - CKD stage 3a  - Anemia of chronic disease   - Multifactorial encephalopathy  - Chronic pain  - Paranoid schizophrenia  - Asthma  - Paroxysmal afib with RVR, on eliquis  - Severe malnutrition  - Hx Kiko en Y gastric bypass  - Cholelithiasis and mild gallbladder distention  - Oliguric MADHU  - Persistent left hydronephrosis   - Deconditioning   - LE edema, chronic  - Decubitus ulcer    Discharge Dx:  -***serous endometrial adenocarcinoma of uterus   - cystotomy s/p suprapubic catheter w/ hx of recurrent ESBL   - Possible urosepsis  - Multifactorial shock  - HTN  - CKD stage 3a  - Anemia of chronic disease   - Multifactorial encephalopathy  - Paranoid schizophrenia  - Asthma  - Paroxysmal afib with RVR, on eliquis  - Severe malnutrition  - Hx Kiko en Y gastric bypass  - Cholelithiasis and mild gallbladder distention  - Constipation  - Oliguric MADHU  - Persistent left hydro, s/p PNT placement  - Hypokalemia, intermittent  - Hypomagnesemia, intermittent  - Hypoglycemia, resolved  - LUE DVT, at PICC site  - Deconditioning   - LE edema, chronic  - Decubitus ulcer    Patient Active Problem List   Diagnosis    HSIL on Pap smear    Cervical cancer (H)    History of urinary tract infection    Vaginal dysplasia    VAIN III (vaginal intraepithelial neoplasia III)    NO SHOW    Carpal tunnel syndrome    Chronic low back pain    Gastric bypass status for obesity    Generalized anxiety disorder    Iron deficiency anemia    Knee pain    Opiate dependence, continuous (H)    Obesity    Osteoarthrosis    Paranoid schizophrenia, chronic condition (H)    Peripheral edema     Vitamin B12 deficiency    Vitamin D deficiency    Stage 3a chronic kidney disease (H)    Anemia due to chronic kidney disease    Thrombocytosis    Benign essential hypertension    Pre-operative cardiovascular examination    Paroxysmal atrial fibrillation (H)    S/P hysterectomy    Lower abdominal pain    Acute cystitis without hematuria    Dysarthria    MADHU (acute kidney injury) (H)    AMS (altered mental status)    Serous adenocarcinoma of uterus (H)    Port-A-Cath in place    Orthopnea    Hyponatremia    Bilateral lower extremity edema    Urinary tract infection associated with cystostomy catheter, initial encounter (H)    Nausea    Suprapubic catheter (H)    Acute cystitis with hematuria    Hypokalemia    Hypomagnesemia    Near syncope    Hypoglycemia    Sepsis, due to unspecified organism, unspecified whether acute organ dysfunction present (H)     Procedures: PICC line placement/removal, PNT placement    Prior to Admission Medications:  Medications Prior to Admission   Medication Sig Dispense Refill Last Dose/Taking    apixaban ANTICOAGULANT (ELIQUIS) 2.5 MG tablet Take 1 tablet (2.5 mg) by mouth 2 times daily. 60 tablet 3 Past Week    atenolol (TENORMIN) 25 MG tablet Take 0.5 tablets (12.5 mg) by mouth daily. 30 tablet 3 Past Week    benztropine (COGENTIN) 1 MG tablet Take 1 mg by mouth 2 times daily.   Past Week    calcium Citrate-vitamin D 500-400 MG-UNIT CHEW Take 1 tablet by mouth daily   Past Week    childrens multivitamin w/iron (FLINTSTONES COMPLETE) 60 MG chewable tablet Take 2 tablets by mouth daily   Past Week    cholecalciferol 125 MCG (5000 UT) CAPS Take 5000 mg 4 times a week   Past Week    cyanocobalamin (CYANOCOBALAMIN) 1000 MCG/ML injection Inject 1 mL (1,000 mcg) into a muscle every month.   More than a month    dexAMETHasone (DECADRON) 4 MG tablet Take 2 tablets (8 mg) by mouth daily (with breakfast). Take daily x 3 days following chemo 6 tablet 11 More than a month    hydrocortisone 2.5 %  "cream Apply topically as needed   Past Week    ondansetron (ZOFRAN) 8 MG tablet Take 1 tablet (8 mg) by mouth every 8 hours as needed for nausea 20 tablet 0 More than a month    oxyCODONE (ROXICODONE) 5 MG tablet Take 1 tablet (5 mg) by mouth every 6 hours as needed for breakthrough pain 30 tablet 0 Past Week    senna-docusate (SENOKOT-S/PERICOLACE) 8.6-50 MG tablet Take 1 tablet by mouth 2 times daily 60 tablet 0 Past Month    sertraline (ZOLOFT) 100 MG tablet Take 100 mg by mouth daily.   Past Week    tiZANidine (ZANAFLEX) 2 MG tablet Take 2 mg by mouth 3 times daily as needed for muscle spasms.   Past Week    triamcinolone (KENALOG) 0.1 % external ointment Apply topically 3 times daily as needed for irritation. 30 g 1 Past Week    zolpidem (AMBIEN) 10 MG tablet Take 10 mg by mouth nightly as needed   Past Week    acetaminophen (TYLENOL) 325 MG tablet Take 2 tablets (650 mg) by mouth every 6 hours as needed for mild pain 24 tablet 0     albuterol (PROAIR HFA/PROVENTIL HFA/VENTOLIN HFA) 108 (90 Base) MCG/ACT inhaler Inhale 1-2 puffs into the lungs every 4 hours as needed for shortness of breath       ferrous sulfate (CASSANDRA-IN-SOL) 75 (15 FE) MG/ML oral drops Take 15 mg by mouth daily       lidocaine (XYLOCAINE) 5 % external ointment Apply 1 Application topically as needed       naloxone (NARCAN) 4 MG/0.1ML nasal spray Spray 1 spray (4 mg) into one nostril alternating nostrils as needed for opioid reversal every 2-3 minutes until assistance arrives 2 each 0     polyethylene glycol (MIRALAX) 17 GM/Dose powder Take 17 g by mouth daily as needed for constipation 510 g 0     prochlorperazine (COMPAZINE) 5 MG tablet Take 1-2 tablets (5-10 mg) by mouth every 6 hours as needed for nausea or vomiting 40 tablet 0     Skin Protectants, Misc. (INTERDRY 10\"X36\") SHEE Externally apply 10 each topically every 24 hours. Apply to skin folds on abdomen 10 each 1        Discharge Medications:      Consultations:  - Medical ICU  - " Psychiatry  - Cardiology   - Interventional radiology   - Infectious disease   - Urology   - Social work   - WOC nurse   - Evsshq-[id number]-Xfybhubjn    Brief History of Illness:  From H&P:   Alis Hartman is a 69 year old female with serous endometrial adenocarcinoma of uterus s/p SNEHA, BSO (7/12/2024) s/p Cycle 3 carbo/taxel (10/15/24), cystotomy s/p suprapubic catheter w/ hx of ESBL urosepsis & bacteremia, A-fib on eliquis, HTN, CKD stage 3a, and anemia who presents to the emergency department for abdominal pain and altered mental status.      The patient has been having increased abdominal pain and bloody/cloudy urine over the past three days; however her pain increased significantly today, and she began to develop more confusion. The patient reports the abdominal pain is similar to when she had UTIs in the past. The patient reports no fever, blurry vision, chest pain, difficulty breathing. The patient endorses worsening chills, decreased appetite, and dizziness. The patient also reports vaginal itching and burning that she has been experiencing for the past 2 weeks along with white discharge. The patient also states that her decubitis ulcer has been growing more painful over the past few weeks to the point where she has difficulty just sitting with it. She notes no exudate or erythema, but some more increased swelling.     Upon presentation to the home, EMS took a blood glucose level which was in the 40s. A repeat BG level after an attempt to correct it was in the 50s. The patient's history is elicited from her and her daughter who reports that her mother is not herself, and is in much more pain than she usually is.    Hospital Course:  Dz:   - Serous endometrial adenocarcinoma of uterus s/p SNEHA, BSO (7/12/2024) s/p Cycle 3 Carbo/taxel (10/15/2024). Fourth cycle delayed given inpatient hospitalization. Patient and family ultimately decided not to pursue further treatment before patient was discharged.       FEN:  - Patient has a history of a Kiko-en-Y gastric bypass and severe malnutrition secondary to minimal PO intake at home. Nutrition was consulted inpatient as well as [id number]-[id number] to assess patient safety for PO intake. An NG tube was placed for feeding while inpatient and SLP assisted with safely escalating PO intake. SLP cleared for soft diet with bite-sized pieces and without need for 1:1 supervision. In preparation for discharge to a TCU, NG tube was replaced with an NJ tube ***    Pain:   - Patient's PTA chronic pain medication regimen was continued inpatient with the exception of Gabapentin, which was held due to concern for oversedation.      CV:  - Patient arrived with abdominal pain and altered mental status. Upon arrival, pt was hypotensive with BP 88/59, and hypothermic w/temp of 35.7 C. UA was suggestive of UTI and LA was elevated. Her hypotension was unresponsive to IVF so the patient was transferred to the MICU for pressor support in the setting of presumed Urosepsis. In addition to this infectious etiology, it was assumed that patient's shock was likely exacerbated by chronotropic incompetence. Her PTA Atenolol was held at admission. Cardiology was consulted and recommended PPM implant once she was cleared by infectious disease. Due to a history of urosepsis and bacteremia and given pt's symptoms of dysuria, pt received a few doses of zosyn and vancomycin intially. However, that was discontinued and she completed 10 days of ertapenem. Patient was able to be transferred out of the MICU on HD#6 and was off pressor support.   - For her history of Atrial Fibrillation with RVR, her PTA Eliquis was initially continued. She developed a DVT of the left upper extremity subclavian/axillary vein at the site of the PICC line. Her Eliquis was subsequently held and a heparin gtt was started. Hematology was consulted who did not feel this was consistent with a failure of the Eliquis. Therapeutic  Eliquis was started in the setting of a recent DVT and the Heparin gtt was stopped.   - Patient has a history of hypertension. Cardiology recommended that her PTA atenolol be held due to concern for chronotropic incompetence. Blood pressures were within normal ranges after her transfer from the ICU on HD#6.     PULM:   - Pt has a history of asthma. There is no evidence of asthma flares during this admission.     HEME:   - Pt was diagnosed with a L nonocclusive DVT around her PICC line 12/1. Pt's PTA Eliquis was held and she was started on a heparin drip.Hematology was consulted who did not feel this was consistent with a failure of the Eliquis and therefore this was resumed and the Heparin gtt was stopped.   - Patient has a history of anemia of chronic disease. On HD#5, her hemoglobin dropped to 6.7, she received 1U PRBCs, and her hemoglobin robert appropriately.     GI:  - Please see FEN regarding history of leandra-en-y gastric bypass and malnutrition.    - Pt had findings on CT abd 11/27 that indicated cholelithiasis and biliary sludge without evidence of acute cholelithiasis. She was asymptomatic therefore no interventions were required.   .  :   - Prior to admission, patient had had bloody/cloudy urine over 3 days with minimal output. At admission, her Cr was notable for an MADHU with Cr 2.01. Imaging was notable for persistent L hydronephrosis. Given concern for post-renal MADHU, a left PNT was placed this admission with plans for exchange at 6 weeks.   - Patient's suprapubic catheter was exchanged this admission with the Urology service. Urology will follow up with the patient after discharge.  - Concern for Urosepsis as above.     ID:   - Please see CV regarding concern for Urosepsis. Patient completed a 10 day course of Ertapenem per ID recommendations.     ENDO:  - Glucose 39 on arrival to ED. Pt given D50 x 2. Likely due to poor oral intake in the setting of sepsis. Patient was euglycemic at discharge.   -Pt had  "hypokalemia, hypomagnesemia, and hypophosphatemia intermittently throughout her admission.  Electrolytes were been monitored and repleted as needed. Pt was also given IV thiamine given malnutrition and hx of gastric bypass.     PSYCH/NEURO:   - Pt has MDD and Schizophrenia managed with home medications initially. Given concern for over-sedation and patient not taking medications at home, psychiatry was consulted and recommended stopping all medications. Psychiatry was re-consulted given flat patient affect and perceived depressed mood on HD#9. They did a thorough interview and assessment and recommended against restarting anti-psychotics and antidepressants.     PPX:    - She was given SCDs, IS, PPI and PTA Eliquis during her hospital course.     Discharge Instructions and Follow up:  Ms. Alis Hartman was discharged from the hospital with follow up with her primary oncologist, Dr. Perez on 12/17/2024 at 9:00AM. She has outpatient follow-up appointments with Urology on 1/15/2025 at 1:30PM and 2/7/2025 at  10:00AM. She has an outpatient cardiology appointment on 2/13/2025 at 9:00AM.     Discharge Diet: { :8128258::\"Regular\"}  Discharge Activity: { :4421800::\"Activity as tolerated\"}  Discharge Follow up: ***      Discharge Disposition:  { :[id number]::\"Discharged to home\"}    Discharge Staff: ***    ***    " the area with NS and pat dry, along with applying no sting film barrier to periwound skin. Cover wound with Sacral Mepilex (#[id number]). Patient was repositioned q2hrs with Tanner-cushion while sitting in the chair.     PPX:    - She was given SCDs, IS, PPI and PTA Eliquis during her hospital course.     Discharge Instructions and Follow up:  Ms. Alis Hartman was discharged from the hospital with follow up with her primary oncologist, Dr. Perez on 12/17/2024 at 9:00AM. She has outpatient follow-up appointments with Urology on 1/15/2025 at 1:30PM and 2/7/2025 at  10:00AM. She has an outpatient cardiology appointment on 2/13/2025 at 9:00AM.     Given patient's Left PNT placement, patient needs to have PNT exchange with IR. Referral was placed for this on discharge.    Discharge Diet: Regular Diet, NGT tube feeds  Discharge Activity: Activity as tolerated      Discharge Disposition:  Discharged to rehabilitation facility    Discharge Staff: MD Lilliam Gordon MD  Gynecologic Oncology  [facility]

## 2024-12-02 NOTE — PROGRESS NOTES
[facility]    ICU Accept Note - Hospitalist Service, GOLD TEAM        Date of Admission:  11/26/2024    Assessment & Plan   Alis Hartman is a 69 year old female admitted on 11/26/2024 for septic shock who is being transferred out of the ICU on 12/2/2024. 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     Hospital course was complicated by admission for septic shock on 11/26/24, transferred to gyn/onc service 11/27 for urosepsis and obstructive uropathy in setting of previous radiation treatments, transferred to the ICU two hours later for septic shock requiring pressors. Suspect AMS related to hypovolemia and poor perfusion with possible septic shock 2/2 obstructive uropathy.       Active issues:     # Multifactorial encephalopathy  Patient initially presented to hospital with AMS. Etiology was suspected multifactorial in the setting of suspected urosepsis, hypoglycemia (blood sugar 34 on arrival), and PTA oversedation . Ongoing intermittent sedation seems to be associated with medications (previously over-sedated with PO dilaudid 1mg and PTA seroquel 400mg) with possible component of hypoactive delirium.   - Delirium precautions               Bright lights during the day, music/TV during the day, up to chair TID  - Hold sedating medications as possible                Hold PTA gabapentin 800 QID, seroquel 400 BID, zolpidem 10mg at bedtime PRN               Appreciate Psychiatry recommendations  - Treat pain with tylenol/topicals     # Chronic pain  # Suprapubic discomfort in the setting of malignancy/possible UTI  - Acetaminophen 650mg q6H  - Diclofenac gel PRN  - Reduced PTA dose oxycodone 2.5mg q6h PRN      #Paranoid schizophrenia  Per daughter, patient has history of schizophrenia but previously did not take psychiatric medications regularly due to over-sedation. While in care facility, patient was prescribed new psychiatric medications (gabapentin 800mg QID, seroquel 400mg BID, zolpidem 10mg at bedtime) with subsequent over-sedation. Daughter notes concern for continued administration of seroquel, gabapentin, or zolpidem due to associated sedation.   - Psychiatry consulted for treatment recommendations***  - Melatonin 3mg at bedtime     # Asthma   No evidence for asthma flare at this time. Breathing comfortably on RA. Imaging with mild bibasilar atelectasis.   - PTA albuterol inhaler PRN  - Incentive spirometer q2H     # Multifactorial shock  Originally admitted to OB/GYN 11/27 with hypotension (BP 88/59), hypothermia (35.7 C) and elevated lactate (2.4) consistent with shock. Etiology suspected multifactorial, including 2/2 urosepsis (UA with leukocyte esterase/WBCs), hypovolemia, and possible chronotropic incompetence. Hypotension improved with volume resuscitation. Off norepinephrine since 2200 on 12/1.  S/p stress-dose steroids (hydrocortisone 150mg 11/27-12/01). Received 25 g of 25% Albumin on 11/30, 12/1, 12/2  - Infectious workup and management, as below  - Monitor. Keep MAP >65     # Paroxysmal afib with RVR  On PTA apixaban 2.5 BID and atenolol. Patient's apixaban was held for 3 days (11/27 - 29) while awaiting possible urological procedure, do not think this DVT constitutes failure of anticoagulation .  - Hold apixaban while on heparin gtt for DVT   - Hold atenolol with intermittent sinus bradycardia (HR 50s)     # Nutrition  # Hx of Kiko en Y gastric bypass   - Nutrition consult, appreciate recommendations               NGT, tube feeds at goal  - Speech consult               Cleared for FLD with 1:1 supervision, strict aspiration precautions  - Pantoprazole ***     # Cholelithiasis and mild gallbladder  distention  Findings seen on CT abd. Pt denies any RUQ pain. RUQ US negative for cholecystitis.  - Monitor symptoms     # Constipation  - Miralax BID  - Senna BID     # Oliguric MADHU  # Persistent left hydronephrosis s/p IR percutaneous nephrostomy (11/29)  # CKD stage 3a  # Hx R hydronephrosis (resolved)  Prior to her most recent cancer surgery the patient was having significant urinary dysfunction likely secondary to history of radiation therapy. Thus at the time of hysterectomy with cystotomy decision was made to place suprapubic catheter due to her poor quality of life preoperatively. Cr 1.79 on admission, up from baseline ~0.9. CT CAP w/o evidence of nephrolithiasis but presence of persistent moderate left hydroureteronephrosis which extends down to level of the urinary bladder. Obstructive uropathy and possible septic shock contributing to MADHU. S/p IR percutaneous nephrostomy (11/29), and urine output improving (600ml on 12/1).  - Monitor BMP daily   - Volume resuscitation with albumin 25% 25g   - Monitor urinary output q***     # Hypokalemia, intermittent  # Hypomagnesemia, intermittent  - RN replacement protocols ordered    # Hypoglycemia, resolved  See current trends below. Resolved with tube feeds at goal.    Recent Labs   Lab 12/02/24  1221 12/02/24  0901 12/02/24  0502 12/02/24  0209 12/01/24  2130   * 117* 76  112* 99 107*       # Possible urosepsis   # Hx of cystotomy s/p suprapubic catheter (7/12/24) w/ recurrent urosepsis  Recent diagnosis of uterine cancer s/p hysterectomy w/ unfortunate complication of iatrogenic cystostomy. Cystostomy repaired 7/12/24 but given the challenge of the repair and damage to bladder from prior pelvic radiation, suprapubic catheter was placed. Since then, pt has had three UTIs (Klebsiella ESBL x 2 and E. Faecium and candida albicans). CT with diffuse moderate L sided hydroureteronephrosis. Urine culture with urogenital elisa but possible that ureteral stricture  preventing infected urine from traveling from kidney to bladder. Definitive diagnosis of urosepsis would require urine cultures from PNT. Urology recommended IR percutaneous nephrostomy tube (placed 11/29). Nephrostomy culture only growing small amount of urogenital elisa. Can consider empiric 10-day course of ertapenem for obstructive UTI.   - ID consulted, appreciate recommendations   - Antibiotics               Ertapenem (11/27-12/6 for 10d course)***  - Urology consult, appreciate recommendations, following in the periphery   - Continue L PNT and SPT to drainage, ok to flush PRN  - SPT exchanged 11/28/24, continue regular exchanges on a monthly basis  - Follow up urine culture for L PNT  - If creatinine remains elevated following decompression recommend exploring alternative etiologies for impaired renal function given patient is maximally decompressed at this point   - Monitor urine output  - consider repeat abdominal US if urine output decreases     # Vaginal burning, itching, discharge  Patient with vaginal discharge, burning, and itching for 2 weeks now. Wet prep negative for yeast, trich, BV, GC, CT.   - Can consider vaginal estrogen therapy***     # Serous endometrial adenocarcinoma of uterus  - S/p SNEHA, BSO (7/12/2024), cystotomy w/ suprapubic catheter, s/p Cycle 3 carbo/taxel (10/15/24). Chemo currently on hold while determining clinical course.     #Anemia of chronic disease: hemoglobin 9.6 on admission. Required 1u PRBC on 12/1, no clear source of bleeding. Suspect dilutional combined with poor BM function.  - Daily CBC     # DVT   Found on bilateral upper extremity US (12/1) to have left subclavian and axillary DVT at PICC site. Can consider provoked DVT in setting of prolonged immobilization, infection, and malignancy vs apixaban failure. Will discuss long-term anticoagulation plan with hematology.  - Heparin gtt  - Remove PICC     # Deconditioning  - PT/OT consulted     # LE edema, chronic   -  lymphedema consult placed     # Decubitus ulcer  - WOC consulted     Plan for next 24 hours:  - Additional volume resuscitation with 25% albumin 25g***  - Psych consult for assistance with medication regimen; daughter notes patient was oversedated on PTA regimen***  - Continue delirium precautions  - Plan for 10 day course of empiric ertapenem  - start full liquid diet with 1:1  - stop pantoprazole  - PICC removal   - Transfer to [facility]    ICU Transfer Checklist    YES NO Links/Additional comments   Current meds & orders reviewed & updated? [] [] Transfer Navigator   O2 requirements appropriate for accepting floor? [] [] O2 Device: Nasal cannula   Oxygen Delivery: 2 LPM   Appropriate antibiotics ordered? [] [] Fever Report  30 Day Micro   Appropriate fluids ordered? [] []    Appropriate diet ordered? [] [] Adult Formula Drip Feeding: Continuous Osmolite 1.5; Nasogastric tube; Goal Rate: 50; Advance to 20 mL/hr now. Advance as tolerated by 10 mL q 8 hr to goal rate of 50 mL/hr; mL/hr; Do not advance tube feeding rate unless K+ is = or > 3.0, Mg++ is ...  Full Liquid Diet  Snacks/Supplements Adult: Other; Ensure Max daily at breakfast + Ensure Enlive daily at lunch + Magic Cup daily dinner; Between Meals   Telemetry indicated/ordered?    [] [] Cardiac Monitoring: ACTIVE order. Indication: ICU     Appropriate DVT prophy ordered? [] [] Anticoag/VTE   Shahid indicated/ordered?    [] [] Not present   Central lines indicated? [] [] LDA Avatar  PICC 11/28/24 Double Lumen Left Basilic ok to use PICC-Site Assessment: WDL   PT/OT indicated/ordered? [] [] D/C Planner  [facility]   Code status reviewed? [] [] Full Code   Preferred contact on file? [] []      Clinically Significant Risk Factors   { TIP  Diagnoses will continue to appear throughout the admission.  :29901}     # Hypokalemia: Lowest K = 3.3 mmol/L in last 2 days, will replace as needed        # Hypoalbuminemia: Lowest albumin = 1.8 g/dL at 11/27/2024  5:50 AM,  "will monitor as appropriate   # Thrombocytopenia: Lowest platelets = 94 in last 2 days, will monitor for bleeding   # Hypertension: Noted on problem list     # Acute Hypoxic Respiratory Failure: Documented O2 saturation < 90%. Continue supplemental oxygen as needed         # Obesity: Estimated body mass index is 30.46 kg/m  as calculated from the following:    Height as of this encounter: 1.6 m (5' 3\").    Weight as of this encounter: 78 kg (171 lb 15.3 oz).   # Severe Malnutrition: based on nutrition assessment    # Financial/Environmental Concerns: none         Disposition Plan   {TIP  It is advised to update the Medical Readiness for Discharge [MRD] daily, until the patient is 'Ready Now.' Last Documentation- Anticipated in 2-4 Days  . Use the SmartList below to update for today:735698}  Medically Ready for Discharge: {TIME; MEDICALLY READY FOR DISCHARGE:89327806}      The patient's care was discussed with the { :510090}.    ERINN Funes [facility]  Hospitalist Service, [facility]  Securely message with Vocera (more info)  Text page via [employer] Paging/Directory   See signed in provider for up to date coverage information    This chart documentation was completed with Dragon voice-recognition software. Even though reviewed, this chart may still contain some grammatical, spelling, and word errors. Please contact the author for any questions or clarifications.     ______________________________________________________________________    Interval History   Nursing/SW/consult/interdisciplinary healthcare worker's notes reviewed.     I reviewed all medications, new labs and imaging results over the last 24 hours. Please see discussion of these results in the A/P.    No acute events overnight. ***.     ROS: 12 point ROS negative other than the symptoms noted here or above in the assessment and plan. \"    Physical Exam   Vital Signs: Temp: 97.4  F (36.3  C) Temp " "src: Axillary BP: (!) 86/54 Pulse: 71   Resp: 16 SpO2: 92 % O2 Device: Nasal cannula Oxygen Delivery: 2 LPM  Weight: 171 lbs 15.34 oz    {Recommend personal SmartPhrase or Notewriter for exam (OPTIONAL)   :[id number]}    Medical Decision Making   { TIP   MDM Calculator    MDM grid (w/ times)    Coding Support Chat  Billing is now based on time OR medical decision making complexity. Medical decision making included in your A&P does NOT need to be re-documented here.    :72469}    {Time  :602624::\"*** MINUTES SPENT BY ME on the date of service doing chart review, history, exam, documentation & further activities per the note.\"}      Data   Unresulted Labs Ordered in the Past 30 Days of this Admission       Date and Time Order Name Status Description    11/27/2024 10:04 AM Blood Culture Portacath Preliminary             I have personally reviewed the following data over the past 24 hrs:    6.9  \   7.9 (L)   / 94 (L)     138 105 33.4 (H) /  108 (H)   3.3 (L) 24 1.67 (H) \     INR:  N/A PTT:  >240 (HH)   D-dimer:  N/A Fibrinogen:  N/A       Imaging results reviewed over the past 24 hrs:   No results found for this or any previous visit (from the past 24 hours).  "

## 2024-12-02 NOTE — CONSULTS
"          Psychiatry Consultation; Follow up              Reason for Consult, requesting source:    Review psych meds. I had seen her 7/30/24 and 8/2/24.  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 Suspect AMS related to hypovolemia and poor perfusion with possible septic shock 2/2 obstructive uropathy.\"    According to her daughter, Aranza tends to be non compliant with her psych meds and has not shown any significant psychosis symptoms in the past year or so. When I had seen her in July it was noted that she was receiving 400 mg of Seroquel (and other CNS active meds) at a TCU, but prior to admission to the TCU she had only been taking a lower dose of Seroquel as needed.   I had suggested going down to 100 mg HS.late July,  but in early August her daughter wanted us to go without psych meds to see how she would do; Seroquel and Zoloft discontinued.   PTA psych meds this admission were benztropine 1 mg BID, Zoloft 100 mg per day, Ambien 10 mg HS. She received 400 mg of Seroquel HS on 11/30, then held. I don't see Seroquel on PTA med list, I'm not sure if she is taking gabapentin.     According to her daughter Aranza has " "not taken Zoloft or Seroquel since we decided to discontinue them in August. Also rarely takes Ambien; \"she doesn't need anything for sleep\".   Fill history was reviewed with pharmacist; they have been filled every month. Apparently one of her PCAs  her RXs.   Aranza is quite groggy today which her daughter attributes to the Seroquel which she received on the 30th. However, she says that she is starting to come around, more like herself. She is indeed difficult to interview due to her somnolence. She denies any mood symptoms, admits to some anxiety, excessive worry.   Her daughter also mentions that Aranza was confused PTA which she attributes to an infection in the urine (U/A from 11/29 does look like a UTI. This has happened several times before.            Current Medications:     Current Facility-Administered Medications   Medication Dose Route Frequency Provider Last Rate Last Admin    acetaminophen (TYLENOL) tablet 650 mg  650 mg Oral Q6H Anali Nation MD   650 mg at 12/02/24 0853    [Held by provider] atenolol (TENORMIN) half-tab 12.5 mg  12.5 mg Oral Daily Ashwin Mathis MD        [Held by provider] benztropine (COGENTIN) tablet 1 mg  1 mg Oral or Feeding Tube Daily Antonio Carpenter MD   1 mg at 12/01/24 0756    calcium carbonate-vitamin D (CALTRATE) 600-10 MG-MCG per tablet 1 tablet  1 tablet Oral or Feeding Tube BID w/meals Antonio Carpenter MD   1 tablet at 12/02/24 0854    childrens multivitamin w/iron (FLINTSTONES COMPLETE) chewable tablet 1 tablet  1 tablet Oral or Feeding Tube Daily Antonio Carpenter MD   1 tablet at 12/02/24 0854    cyanocobalamin (VITAMIN B-12) tablet 500 mcg  500 mcg Oral or Feeding Tube Daily Antonio Carpenter MD   500 mcg at 12/02/24 0853    [START ON 12/3/2024] ertapenem (INVanz) 1 g vial to attach to  mL bag  1 g Intravenous Q24H Anali Nation MD        [Held by provider] gabapentin (NEURONTIN) capsule 200 mg  200 mg Oral TID Antonio Carpenter MD        " "insulin aspart (NovoLOG) injection (RAPID ACTING)  1-3 Units Subcutaneous TID AC Anali Nation MD   1 Units at 11/27/24 1701    insulin aspart (NovoLOG) injection (RAPID ACTING)  1-3 Units Subcutaneous At Bedtime Anali Nation MD        melatonin tablet 3 mg  3 mg Oral or Feeding Tube At Bedtime Antonio Carpenter MD   3 mg at 12/01/24 2142    polyethylene glycol (MIRALAX) Packet 17 g  17 g Oral or Feeding Tube BID Gloria Wen MD   17 g at 12/01/24 2142    Prosource TF20 ENfit Compatibl EN LIQD (PROSOURCE TF20) packet 60 mL  1 packet Per Feeding Tube Daily Gloria Wen MD   60 mL at 12/01/24 0756    [Held by provider] QUEtiapine (SEROquel) tablet 25 mg  25 mg Oral At Bedtime Amy English CNP        senna-docusate (SENOKOT-S/PERICOLACE) 8.6-50 MG per tablet 1 tablet  1 tablet Oral BID Gloria Wen MD        Or    senna-docusate (SENOKOT-S/PERICOLACE) 8.6-50 MG per tablet 2 tablet  2 tablet Oral BID Gloria Wen MD   2 tablet at 12/01/24 2142    [Held by provider] sertraline (ZOLOFT) tablet 50 mg  50 mg Oral or Feeding Tube Daily Jojo Green APRN CNP        sodium chloride (PF) 0.9% PF flush 3 mL  3 mL Intracatheter Q8H Avery Gregory MD   3 mL at 12/02/24 0452    thiamine (B-1) injection 250 mg  250 mg Intravenous Daily Roberto Issa APRN CNP   250 mg at 12/02/24 0853    Followed by    [START ON 12/6/2024] thiamine (B-1) tablet 100 mg  100 mg Oral Daily Roberto Issa APRN CNP                  MSE:   Appearance: adequately groomed and somnolent  Attitude:  cooperative  Eye Contact:  fair  Mood:  \"fair\"  Affect:  quite restricted   Speech:  mumbling  Psychomotor Behavior:  no evidence of tardive dyskinesia, dystonia, or tics  Muscle strength and tone: intact   Thought Process:  circumstantial  Associations:  no loose associations  Thought Content:  no evidence of suicidal ideation or homicidal ideation and no evidence of psychotic thought  Insight:  fair  Judgement:  fair  Oriented to:  " "person and place   Attention Span and Concentration:  fair  Recent and Remote Memory:  limited    Vital signs:  Temp: 97.4  F (36.3  C) Temp src: Axillary BP: (!) 86/54 Pulse: 71   Resp: 16 SpO2: 92 % O2 Device: Nasal cannula Oxygen Delivery: 2 LPM Height: 160 cm (5' 3\") Weight: 78 kg (171 lb 15.3 oz)  Estimated body mass index is 30.46 kg/m  as calculated from the following:    Height as of this encounter: 1.6 m (5' 3\").    Weight as of this encounter: 78 kg (171 lb 15.3 oz).    Qtc:  441 ms 11/30         DSM-5 Diagnosis:   Past diagnosis of Schizophrenia  RAFAEL   Unspecified neurocognitive Disorder: MOCA 25/30 9/22/22  Recent delirium           Assessment:   It appears that her PCAs filling her prescriptions are diverting them; Aranza is not receiving Seroquel or Zoloft, I'm not sure about Ambien or gabapentin.   Her daughter is planning to contact the prescriber Chaparrita Miramontes to have her stop filling the RXs that Aranza is not taking.             Summary of Recommendations:   Hold on Zoloft and Seroquel  Would check with her daughter Joy about whether she should be on gabapentin for pain and anxiety, whether she use any Ambien (would use 5 mg rather than 10 if it is used, not appropriate if she has delirium).     Contact me as needed.  Mian Serna M.D.   Consult Liaison Psychiatry   [facility]   Contact on [facility]  If I am not available, then [facility] line ([phone]) should know who   Is covering our consult service.     \"Much or all of the text in this note was generated through the use of Dragon Dictate voice to text software. Errors in spelling or words which appear to be out of contact are unintentional, may be present due having escaped editing\"           "

## 2024-12-03 ENCOUNTER — APPOINTMENT (OUTPATIENT)
Dept: OCCUPATIONAL THERAPY | Facility: CLINIC | Age: 69
DRG: 698 | End: 2024-12-03
Payer: COMMERCIAL

## 2024-12-03 ENCOUNTER — APPOINTMENT (OUTPATIENT)
Dept: PHYSICAL THERAPY | Facility: CLINIC | Age: 69
DRG: 698 | End: 2024-12-03
Payer: COMMERCIAL

## 2024-12-03 ENCOUNTER — APPOINTMENT (OUTPATIENT)
Dept: SPEECH THERAPY | Facility: CLINIC | Age: 69
DRG: 698 | End: 2024-12-03
Payer: COMMERCIAL

## 2024-12-03 LAB
ANION GAP SERPL CALCULATED.3IONS-SCNC: 7 MMOL/L (ref 7–15)
APTT PPP: 59 SECONDS (ref 22–38)
APTT PPP: 77 SECONDS (ref 22–38)
BUN SERPL-MCNC: 33.4 MG/DL (ref 8–23)
CALCIUM SERPL-MCNC: 7.6 MG/DL (ref 8.8–10.4)
CHLORIDE SERPL-SCNC: 111 MMOL/L (ref 98–107)
CREAT SERPL-MCNC: 1.4 MG/DL (ref 0.51–0.95)
EGFRCR SERPLBLD CKD-EPI 2021: 40 ML/MIN/1.73M2
ERYTHROCYTE [DISTWIDTH] IN BLOOD BY AUTOMATED COUNT: 17.3 % (ref 10–15)
GLUCOSE BLDC GLUCOMTR-MCNC: 117 MG/DL (ref 70–99)
GLUCOSE BLDC GLUCOMTR-MCNC: 49 MG/DL (ref 70–99)
GLUCOSE BLDC GLUCOMTR-MCNC: 55 MG/DL (ref 70–99)
GLUCOSE BLDC GLUCOMTR-MCNC: 64 MG/DL (ref 70–99)
GLUCOSE BLDC GLUCOMTR-MCNC: 69 MG/DL (ref 70–99)
GLUCOSE BLDC GLUCOMTR-MCNC: 73 MG/DL (ref 70–99)
GLUCOSE BLDC GLUCOMTR-MCNC: 74 MG/DL (ref 70–99)
GLUCOSE BLDC GLUCOMTR-MCNC: 78 MG/DL (ref 70–99)
GLUCOSE BLDC GLUCOMTR-MCNC: 84 MG/DL (ref 70–99)
GLUCOSE BLDC GLUCOMTR-MCNC: 94 MG/DL (ref 70–99)
GLUCOSE SERPL-MCNC: 103 MG/DL (ref 70–99)
HCO3 SERPL-SCNC: 25 MMOL/L (ref 22–29)
HCT VFR BLD AUTO: 23.8 % (ref 35–47)
HGB BLD-MCNC: 7.7 G/DL (ref 11.7–15.7)
MAGNESIUM SERPL-MCNC: 2.1 MG/DL (ref 1.7–2.3)
MCH RBC QN AUTO: 31.2 PG (ref 26.5–33)
MCHC RBC AUTO-ENTMCNC: 32.4 G/DL (ref 31.5–36.5)
MCV RBC AUTO: 96 FL (ref 78–100)
PHOSPHATE SERPL-MCNC: 1.7 MG/DL (ref 2.5–4.5)
PHOSPHATE SERPL-MCNC: 1.9 MG/DL (ref 2.5–4.5)
PLATELET # BLD AUTO: 95 10E3/UL (ref 150–450)
POTASSIUM SERPL-SCNC: 3.1 MMOL/L (ref 3.4–5.3)
POTASSIUM SERPL-SCNC: 3.7 MMOL/L (ref 3.4–5.3)
RBC # BLD AUTO: 2.47 10E6/UL (ref 3.8–5.2)
SODIUM SERPL-SCNC: 143 MMOL/L (ref 135–145)
WBC # BLD AUTO: 5 10E3/UL (ref 4–11)

## 2024-12-03 PROCEDURE — 258N000001 HC RX 258

## 2024-12-03 PROCEDURE — 36591 DRAW BLOOD OFF VENOUS DEVICE: CPT

## 2024-12-03 PROCEDURE — 82374 ASSAY BLOOD CARBON DIOXIDE: CPT

## 2024-12-03 PROCEDURE — 92526 ORAL FUNCTION THERAPY: CPT | Mod: GN

## 2024-12-03 PROCEDURE — 84132 ASSAY OF SERUM POTASSIUM: CPT | Performed by: OBSTETRICS & GYNECOLOGY

## 2024-12-03 PROCEDURE — 85730 THROMBOPLASTIN TIME PARTIAL: CPT | Performed by: OBSTETRICS & GYNECOLOGY

## 2024-12-03 PROCEDURE — 99233 SBSQ HOSP IP/OBS HIGH 50: CPT | Mod: GC | Performed by: OBSTETRICS & GYNECOLOGY

## 2024-12-03 PROCEDURE — 83735 ASSAY OF MAGNESIUM: CPT

## 2024-12-03 PROCEDURE — 250N000011 HC RX IP 250 OP 636

## 2024-12-03 PROCEDURE — 250N000013 HC RX MED GY IP 250 OP 250 PS 637

## 2024-12-03 PROCEDURE — 250N000013 HC RX MED GY IP 250 OP 250 PS 637: Performed by: INTERNAL MEDICINE

## 2024-12-03 PROCEDURE — 36591 DRAW BLOOD OFF VENOUS DEVICE: CPT | Performed by: OBSTETRICS & GYNECOLOGY

## 2024-12-03 PROCEDURE — 80048 BASIC METABOLIC PNL TOTAL CA: CPT

## 2024-12-03 PROCEDURE — 84100 ASSAY OF PHOSPHORUS: CPT | Performed by: OBSTETRICS & GYNECOLOGY

## 2024-12-03 PROCEDURE — 120N000003 HC R&B IMCU UMMC

## 2024-12-03 PROCEDURE — 250N000013 HC RX MED GY IP 250 OP 250 PS 637: Performed by: OBSTETRICS & GYNECOLOGY

## 2024-12-03 PROCEDURE — 84100 ASSAY OF PHOSPHORUS: CPT

## 2024-12-03 PROCEDURE — 85014 HEMATOCRIT: CPT

## 2024-12-03 PROCEDURE — 250N000013 HC RX MED GY IP 250 OP 250 PS 637: Performed by: NURSE PRACTITIONER

## 2024-12-03 PROCEDURE — 97530 THERAPEUTIC ACTIVITIES: CPT | Mod: GP | Performed by: PHYSICAL THERAPIST

## 2024-12-03 PROCEDURE — 97140 MANUAL THERAPY 1/> REGIONS: CPT | Mod: GO

## 2024-12-03 RX ORDER — POTASSIUM CHLORIDE 1.5 G/1.58G
40 POWDER, FOR SOLUTION ORAL ONCE
Status: COMPLETED | OUTPATIENT
Start: 2024-12-03 | End: 2024-12-03

## 2024-12-03 RX ORDER — POTASSIUM CHLORIDE 1.5 G/1.58G
20 POWDER, FOR SOLUTION ORAL ONCE
Status: COMPLETED | OUTPATIENT
Start: 2024-12-03 | End: 2024-12-03

## 2024-12-03 RX ORDER — LIDOCAINE HYDROCHLORIDE 20 MG/ML
5 SOLUTION OROPHARYNGEAL ONCE
Status: DISCONTINUED | OUTPATIENT
Start: 2024-12-03 | End: 2024-12-08 | Stop reason: HOSPADM

## 2024-12-03 RX ADMIN — APIXABAN 10 MG: 5 TABLET, FILM COATED ORAL at 20:35

## 2024-12-03 RX ADMIN — CYANOCOBALAMIN TAB 500 MCG 500 MCG: 500 TAB at 10:13

## 2024-12-03 RX ADMIN — OXYCODONE HYDROCHLORIDE 2.5 MG: 5 TABLET ORAL at 05:50

## 2024-12-03 RX ADMIN — Medication 1 TABLET: at 10:13

## 2024-12-03 RX ADMIN — ACETAMINOPHEN 650 MG: 325 TABLET, FILM COATED ORAL at 03:13

## 2024-12-03 RX ADMIN — CALCIUM CARBONATE 600 MG (1,500 MG)-VITAMIN D3 400 UNIT TABLET 1 TABLET: at 19:02

## 2024-12-03 RX ADMIN — POTASSIUM & SODIUM PHOSPHATES POWDER PACK 280-160-250 MG 2 PACKET: 280-160-250 PACK at 16:25

## 2024-12-03 RX ADMIN — OXYCODONE HYDROCHLORIDE 5 MG: 5 TABLET ORAL at 13:15

## 2024-12-03 RX ADMIN — ACETAMINOPHEN 650 MG: 325 TABLET, FILM COATED ORAL at 10:13

## 2024-12-03 RX ADMIN — PROCHLORPERAZINE EDISYLATE 5 MG: 5 INJECTION INTRAMUSCULAR; INTRAVENOUS at 17:39

## 2024-12-03 RX ADMIN — THIAMINE HYDROCHLORIDE 250 MG: 100 INJECTION, SOLUTION INTRAMUSCULAR; INTRAVENOUS at 12:47

## 2024-12-03 RX ADMIN — POTASSIUM & SODIUM PHOSPHATES POWDER PACK 280-160-250 MG 2 PACKET: 280-160-250 PACK at 12:50

## 2024-12-03 RX ADMIN — DEXTROSE MONOHYDRATE 25 ML: 25 INJECTION, SOLUTION INTRAVENOUS at 23:21

## 2024-12-03 RX ADMIN — DEXTROSE MONOHYDRATE 25 ML: 25 INJECTION, SOLUTION INTRAVENOUS at 23:01

## 2024-12-03 RX ADMIN — POTASSIUM & SODIUM PHOSPHATES POWDER PACK 280-160-250 MG 2 PACKET: 280-160-250 PACK at 19:02

## 2024-12-03 RX ADMIN — OXYCODONE HYDROCHLORIDE 5 MG: 5 TABLET ORAL at 20:35

## 2024-12-03 RX ADMIN — POTASSIUM CHLORIDE 20 MEQ: 1.5 POWDER, FOR SOLUTION ORAL at 20:35

## 2024-12-03 RX ADMIN — POTASSIUM CHLORIDE 40 MEQ: 1.5 POWDER, FOR SOLUTION ORAL at 12:50

## 2024-12-03 RX ADMIN — CALCIUM CARBONATE 600 MG (1,500 MG)-VITAMIN D3 400 UNIT TABLET 1 TABLET: at 10:13

## 2024-12-03 RX ADMIN — ERTAPENEM SODIUM 1 G: 1 INJECTION, POWDER, LYOPHILIZED, FOR SOLUTION INTRAMUSCULAR; INTRAVENOUS at 05:49

## 2024-12-03 RX ADMIN — Medication 3 MG: at 22:24

## 2024-12-03 ASSESSMENT — ACTIVITIES OF DAILY LIVING (ADL)
ADLS_ACUITY_SCORE: 80
ADLS_ACUITY_SCORE: 78
ADLS_ACUITY_SCORE: 72
ADLS_ACUITY_SCORE: 78
ADLS_ACUITY_SCORE: 80
ADLS_ACUITY_SCORE: 78
ADLS_ACUITY_SCORE: 72
ADLS_ACUITY_SCORE: 78
ADLS_ACUITY_SCORE: 72
ADLS_ACUITY_SCORE: 80
ADLS_ACUITY_SCORE: 78
ADLS_ACUITY_SCORE: 72
ADLS_ACUITY_SCORE: 80
ADLS_ACUITY_SCORE: 78
ADLS_ACUITY_SCORE: 74
ADLS_ACUITY_SCORE: 78
ADLS_ACUITY_SCORE: 80
ADLS_ACUITY_SCORE: 78
ADLS_ACUITY_SCORE: 80

## 2024-12-03 NOTE — PROGRESS NOTES
Gynecology Oncology Progress Note  12/03/2024    Ms. 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 Awake and oriented. Currently overall comfortable, but notes continued suprapubic pain at the catheter insertion site. She denies any bleeding. No other concerns this am and would like to rest.     Objective:   Patient Vitals for the past 24 hrs:   BP Temp Temp src Pulse Resp SpO2 Weight   12/03/24 1100 99/56 97.9  F (36.6  C) Oral 81 18 100 % --   12/03/24 0733 95/72 98.3  F (36.8  C) Oral 78 16 100 % --   12/03/24 0520 93/57 98.3  F (36.8  C) Oral 81 13 100 % --   12/03/24 0500 -- -- -- 77 -- 100 % --   12/03/24 0434 101/57 -- -- 80 -- 100 % --   12/03/24 0400 101/57 97.4  F (36.3  C) Oral 79 14 100 % --   12/03/24 0300 -- -- -- 80 -- -- --   12/03/24 0200 -- -- -- 79 -- 100 % --   12/03/24 0100 -- -- -- 80 -- 100 % --   12/03/24 0000 92/68 98.8  F (37.1  C) Oral 78 12 100 % 79.3 kg (174 lb 13.2 oz)   12/02/24 2300 -- -- -- 82 -- 100 % --   12/02/24 2200 -- -- -- 85 -- 100 % --   12/02/24 2100 -- -- -- 79 -- 100 % --   12/02/24 2000 91/55 98.2  F (36.8  C) Oral 73 14 97 % --   12/02/24 1900 94/65 -- -- 79 -- (!) 73 % --   12/02/24 1800 101/61 -- -- 75 -- -- --   12/02/24 1700 105/59 -- -- 75 -- -- --      General: cachectic-appearing female, in NAD, NG tube in place, under multiple layers of blankets  CV: Regular rate  Resp: Non-labored breathing on room air  Abdomen: Soft, moderately tender in the supra-pubic area around catheter,  non-distended  Extremities: 2+ bilateral lower extremity edema  Lines/Drains: Supra-pubic catheter site non-erythematous and suprapubic catheter is draining pink urine. L PNT with minimal bloody appearing urine     Drains: suprapubic catheter, port, pIV, PNT (L)  Intake (24h//since MN)  PO: 0//120  IV: 281.94 mL//72.33 mL  NG/GT: 520 mL//120 mL  Enteral: 950 mL//350 mL    Output:  PNT: 370 mL//65 mL  Suprapubic cath: 525 mL//150 mL  Stool: 8x//1x      Recent Results (from the past 24 hours)   Glucose by meter    Collection Time: 12/02/24  9:40 PM   Result Value Ref Range    GLUCOSE BY METER POCT 59 (L) 70 - 99 mg/dL   Glucose by meter    Collection Time: 12/02/24 10:07 PM   Result Value Ref Range    GLUCOSE BY METER POCT 97 70 - 99 mg/dL   Partial thromboplastin time    Collection Time: 12/02/24 10:09 PM   Result Value Ref Range    aPTT 65 (H) 22 - 38 Seconds   Glucose by meter    Collection Time: 12/02/24 10:55 PM   Result Value Ref Range    GLUCOSE BY METER POCT 116 (H) 70 - 99 mg/dL   Glucose by meter    Collection Time: 12/03/24 12:46 AM   Result Value Ref Range    GLUCOSE BY METER POCT 117 (H) 70 - 99 mg/dL   Glucose by meter    Collection Time: 12/03/24  3:25 AM   Result Value Ref Range    GLUCOSE BY METER POCT 84 70 - 99 mg/dL   Magnesium    Collection Time: 12/03/24  7:07 AM   Result Value Ref Range    Magnesium 2.1 1.7 - 2.3 mg/dL   Phosphorus    Collection Time: 12/03/24  7:07 AM   Result Value Ref Range    Phosphorus 1.7 (L) 2.5 - 4.5 mg/dL   Partial thromboplastin time    Collection Time: 12/03/24  7:07 AM   Result Value Ref Range    aPTT 59 (H) 22 - 38 Seconds   Basic metabolic panel    Collection Time: 12/03/24  7:07 AM   Result Value Ref Range    Sodium 143 135 - 145 mmol/L    Potassium 3.1 (L) 3.4 - 5.3 mmol/L    Chloride 111 (H) 98 - 107 mmol/L    Carbon Dioxide (CO2) 25 22 - 29 mmol/L    Anion Gap 7 7 - 15 mmol/L    Urea Nitrogen 33.4 (H) 8.0 - 23.0 mg/dL    Creatinine 1.40 (H) 0.51 - 0.95 mg/dL     GFR Estimate 40 (L) >60 mL/min/1.73m2    Calcium 7.6 (L) 8.8 - 10.4 mg/dL    Glucose 103 (H) 70 - 99 mg/dL   CBC with platelets    Collection Time: 12/03/24  7:07 AM   Result Value Ref Range    WBC Count 5.0 4.0 - 11.0 10e3/uL    RBC Count 2.47 (L) 3.80 - 5.20 10e6/uL    Hemoglobin 7.7 (L) 11.7 - 15.7 g/dL    Hematocrit 23.8 (L) 35.0 - 47.0 %    MCV 96 78 - 100 fL    MCH 31.2 26.5 - 33.0 pg    MCHC 32.4 31.5 - 36.5 g/dL    RDW 17.3 (H) 10.0 - 15.0 %    Platelet Count 95 (L) 150 - 450 10e3/uL   Glucose by meter    Collection Time: 12/03/24 10:09 AM   Result Value Ref Range    GLUCOSE BY METER POCT 69 (L) 70 - 99 mg/dL   Glucose by meter    Collection Time: 12/03/24 11:16 AM   Result Value Ref Range    GLUCOSE BY METER POCT 94 70 - 99 mg/dL   Glucose by meter    Collection Time: 12/03/24 12:36 PM   Result Value Ref Range    GLUCOSE BY METER POCT 78 70 - 99 mg/dL     Assessment/Plan: Alis Hartman is a 69 year old female with serous endometrial adenocarcinoma of uterus, currently s/p 3 cycles carboplatin/paclitaxel who was admitted with mental status changes attributed to UTI/urosepsis. She was weaned off of pressors in the evening of 12/1, and has been able to maintain an appropriate blood pressure since. Today goals are to transition from heparin gtt to DOAC.    # Urosepsis  # Septic shock, resolved  # Oliguria, Improving   # MADHU, improving  # CKD stage 3a  -While no cultures have grown anything to date this admission, given her previous admissions and ESBL, ID recommends completing a 10 day course of ertepenem.  -Urology plans for her to follow up outpatient for suprapubic catheter exchange  -Cr is slowing improving as is UOP.  -Continue to avoid nephrotoxic medications  -Continue Ertapenam per ID recommendations. Per ID, no need to prophylaxis on discharge. Today is D#7/10 of ertapenem.    # L subclavian/axillary non occlusive DVT around PICC  -Plan transition to PO anticoagulation today and d/c heparin drip  "pending Heme evaluation as she developed new clot while on Eliquis.  -PICC line removed   -Heme stated no need for formal consult, ok to restart DOAC    # Serous endometrial adenocarcinoma of uterus  - No further systemic therapy planned per pt's primary oncologist given multiple admissions and poor functional status    #Severe Malnutrition  #Hypokalemia  -Replace electrolytes PRN  -Encourage PO intake. Remove NG tube when patient adequately taking in PO     # Atrial fibrillation on eliquis (held)  # Concern for chronotropic incompetence  -Holding PTA atenolol in the setting of bradycardia and hypotension. Will re-consult after 10 days of ertapenem.  -Per Cardiology:  - Once patient is out of ICU and has been clear of infectious process by infectious disease team, could consider placement of PPM implant  - Continue to hold patient's PTA atenolol    #MDD/Schizophrenia  #Waxing and Waning Mental Status  #C/f Delirium   -Psych consult yesterday: hold ambien, seroquel, gabapentin, and zoloft.   -Discuss with daughter Joy about gabapentin for pain/anxiety, and whether she takes ambien per recommendation from psych. Joy states that she doesn't take gabapentin, and that she's never taken ambien for sleep.      # Chronic stable conditions  - Anemia of chronic disease: Stable, transfuse to Hb >7  - Asthma: PTA albuterol   - Hx/o gastric bypass - avoid NSAIDs  - Stage 2 Sacral/Coccygeal Pressure Ulcer- WOC consulted; appreciate cares.      Code status: After discussion with patient and daughter when patient had capacity, remains full code.     Clinically Significant Risk Factors     # Obesity: Estimated body mass index is 30.97 kg/m  as calculated from the following:    Height as of this encounter: 1.6 m (5' 3\").    Weight as of this encounter: 79.3 kg (174 lb 13.2 oz).    # Severe Malnutrition: based on nutrition assessment      Dispo: TCU recommended. Will continue discussions with patient and family.     Patient " discussed with Dr. Jon Mathis MD  Obstetrics and Gynecology, PGY-2  12/03/2024 7:32 AM     I, Corby Webb MD personally examined and evaluated this patient on 12/03/24.  I discussed the patient with the resident and care team, and agree with the assessment and plan of care as documented in the residents note above.    I personally reviewed vital signs, laboratory values and imaging results.      Lilliam Webb MD  Gynecologic Oncology  [facility]

## 2024-12-03 NOTE — PROGRESS NOTES
Transferred to: 6B at 0500  Status at time of transfer: A&O x4. VSS on RA. Heparin gtt infusing.   Belongings: sent with pt.  Shahid removed? (if no, why?): Suprapubic catheter in place  Chart and medications: sent with pt  Family notified:  No

## 2024-12-03 NOTE — PROGRESS NOTES
Interval progress note    S: Patient feeling well today. Answering questions, able to speak and hold a conversation. Reports her pain is improved, though she has a bit of abdominal pain near her catheter insert site. Reports no nausea/vomiting. Having some diarrhea. On 1:1 observed feeding per SLP.    O:  Patient Vitals for the past 24 hrs:   BP Temp Temp src Pulse Resp SpO2   12/02/24 1900 94/65 -- -- 79 -- (!) 73 %   12/02/24 1800 101/61 -- -- 75 -- --   12/02/24 1700 105/59 -- -- 75 -- --   12/02/24 1600 104/61 97.7  F (36.5  C) Oral 79 16 --   12/02/24 1500 102/58 -- -- 76 -- --   12/02/24 1400 97/62 -- -- 82 -- --     GEN: NAD, lying in bed with NG tube in place. Covered with multiple blankets and warming device.  CV: regular rate, well-perfused  Pulm: no IWOB. On 3L oxygen  Abd: Soft, NTND. Suprapubic catheter site clean, covered in gauze    UOp: 0.72 ml/kg/hr    A/P:  Patient doing well, was transferred to the gyn-oncology service today after weaning off of pressors last night. Adequate urine output. Will continue to monitor overnight.    Ashwin Mathis MD  Gynecologic-Oncology, PGY-2   12/02/2024 7:53 PM    Gyn-Onc pager: [phone]

## 2024-12-03 NOTE — PROGRESS NOTES
Transfer  Transferred from:   Via:bed with nurse  Reason for transfer: Pt appropriate for 6B- improved condition  Family: Will notify at an appropriate hour  Belongings: Received with pt  Chart: Received with pt   Medications: Meds received from old unit with pt  Code Status verified on armband: yes  2 RN Skin Assessment Completed By: Bessie (Charge) and Shea  Suction/Ambu bag/Flowmeter at bedside: yes    Report received from: Dany DIA  Pt status: Vitally stable, heparin gtt running at 500, tube feeds running through NG at 50mL/hr with 30mL flush Q4. Suprapubic, and PNT tubes both with red tinged output. Excoriated quang area, mild pressure wound on sacrum, skin tear on RUE, DVT on LUE, Port accessed running the heparin, PIV TKO. Gave PRN oxy 2.5mg x1. Patient currently asleep in bed with the TV on and call light in hand. Complained of being cold despite having multiple warm blankets stacked.

## 2024-12-03 NOTE — PROGRESS NOTES
Major Shift Events:  Assumed cares at ~1600. See flowsheets for exam and vitals. TF at goal of 50, patient with frequent watery stools. Suprapubic catheter and nephrostomy tube with low bloody output, team aware.     Plan: transfer to   For vital signs and complete assessments, please see documentation flowsheets.

## 2024-12-03 NOTE — PROGRESS NOTES
Patient's Cr is downtrending, PNT in place, SPT in place draining. At this time she is maximally drained.     P:  Patient will need f/u in a few weeks with Dr. Monroy for discussion of further evaluation of bladder dysfunction vs ureteral stricture. This will be coordinated by us.     Urology will sign off.     Adrian Ny MD  Chief Urology Resident, PGY-5  [phone]

## 2024-12-04 ENCOUNTER — APPOINTMENT (OUTPATIENT)
Dept: OCCUPATIONAL THERAPY | Facility: CLINIC | Age: 69
DRG: 698 | End: 2024-12-04
Payer: COMMERCIAL

## 2024-12-04 LAB
ANION GAP SERPL CALCULATED.3IONS-SCNC: 6 MMOL/L (ref 7–15)
ATRIAL RATE - MUSE: 58 BPM
BUN SERPL-MCNC: 27.5 MG/DL (ref 8–23)
CALCIUM SERPL-MCNC: 7.3 MG/DL (ref 8.8–10.4)
CHLORIDE SERPL-SCNC: 114 MMOL/L (ref 98–107)
CREAT SERPL-MCNC: 1.09 MG/DL (ref 0.51–0.95)
DIASTOLIC BLOOD PRESSURE - MUSE: NORMAL MMHG
EGFRCR SERPLBLD CKD-EPI 2021: 54 ML/MIN/1.73M2
ERYTHROCYTE [DISTWIDTH] IN BLOOD BY AUTOMATED COUNT: 17.7 % (ref 10–15)
GLUCOSE BLDC GLUCOMTR-MCNC: 104 MG/DL (ref 70–99)
GLUCOSE BLDC GLUCOMTR-MCNC: 107 MG/DL (ref 70–99)
GLUCOSE BLDC GLUCOMTR-MCNC: 47 MG/DL (ref 70–99)
GLUCOSE BLDC GLUCOMTR-MCNC: 54 MG/DL (ref 70–99)
GLUCOSE BLDC GLUCOMTR-MCNC: 59 MG/DL (ref 70–99)
GLUCOSE BLDC GLUCOMTR-MCNC: 76 MG/DL (ref 70–99)
GLUCOSE BLDC GLUCOMTR-MCNC: 80 MG/DL (ref 70–99)
GLUCOSE BLDC GLUCOMTR-MCNC: 80 MG/DL (ref 70–99)
GLUCOSE BLDC GLUCOMTR-MCNC: 81 MG/DL (ref 70–99)
GLUCOSE BLDC GLUCOMTR-MCNC: 87 MG/DL (ref 70–99)
GLUCOSE BLDC GLUCOMTR-MCNC: 90 MG/DL (ref 70–99)
GLUCOSE BLDC GLUCOMTR-MCNC: 96 MG/DL (ref 70–99)
GLUCOSE SERPL-MCNC: 151 MG/DL (ref 70–99)
HCO3 SERPL-SCNC: 25 MMOL/L (ref 22–29)
HCT VFR BLD AUTO: 23.8 % (ref 35–47)
HGB BLD-MCNC: 7.5 G/DL (ref 11.7–15.7)
INTERPRETATION ECG - MUSE: NORMAL
MAGNESIUM SERPL-MCNC: 1.8 MG/DL (ref 1.7–2.3)
MCH RBC QN AUTO: 30.9 PG (ref 26.5–33)
MCHC RBC AUTO-ENTMCNC: 31.5 G/DL (ref 31.5–36.5)
MCV RBC AUTO: 98 FL (ref 78–100)
P AXIS - MUSE: 25 DEGREES
PHOSPHATE SERPL-MCNC: 2.2 MG/DL (ref 2.5–4.5)
PHOSPHATE SERPL-MCNC: 2.3 MG/DL (ref 2.5–4.5)
PHOSPHATE SERPL-MCNC: 2.9 MG/DL (ref 2.5–4.5)
PLATELET # BLD AUTO: 95 10E3/UL (ref 150–450)
POTASSIUM SERPL-SCNC: 3.8 MMOL/L (ref 3.4–5.3)
POTASSIUM SERPL-SCNC: 4 MMOL/L (ref 3.4–5.3)
PR INTERVAL - MUSE: 144 MS
QRS DURATION - MUSE: 82 MS
QT - MUSE: 450 MS
QTC - MUSE: 441 MS
R AXIS - MUSE: 0 DEGREES
RBC # BLD AUTO: 2.43 10E6/UL (ref 3.8–5.2)
SODIUM SERPL-SCNC: 145 MMOL/L (ref 135–145)
SYSTOLIC BLOOD PRESSURE - MUSE: NORMAL MMHG
T AXIS - MUSE: 25 DEGREES
VENTRICULAR RATE- MUSE: 58 BPM
WBC # BLD AUTO: 5.6 10E3/UL (ref 4–11)

## 2024-12-04 PROCEDURE — 80048 BASIC METABOLIC PNL TOTAL CA: CPT

## 2024-12-04 PROCEDURE — 84100 ASSAY OF PHOSPHORUS: CPT | Performed by: OBSTETRICS & GYNECOLOGY

## 2024-12-04 PROCEDURE — 258N000001 HC RX 258

## 2024-12-04 PROCEDURE — 250N000013 HC RX MED GY IP 250 OP 250 PS 637

## 2024-12-04 PROCEDURE — 97140 MANUAL THERAPY 1/> REGIONS: CPT | Mod: GO | Performed by: OCCUPATIONAL THERAPIST

## 2024-12-04 PROCEDURE — 258N000003 HC RX IP 258 OP 636

## 2024-12-04 PROCEDURE — 84100 ASSAY OF PHOSPHORUS: CPT

## 2024-12-04 PROCEDURE — 120N000003 HC R&B IMCU UMMC

## 2024-12-04 PROCEDURE — G0463 HOSPITAL OUTPT CLINIC VISIT: HCPCS

## 2024-12-04 PROCEDURE — 250N000011 HC RX IP 250 OP 636

## 2024-12-04 PROCEDURE — 85048 AUTOMATED LEUKOCYTE COUNT: CPT

## 2024-12-04 PROCEDURE — 250N000013 HC RX MED GY IP 250 OP 250 PS 637: Performed by: INTERNAL MEDICINE

## 2024-12-04 PROCEDURE — 99233 SBSQ HOSP IP/OBS HIGH 50: CPT | Mod: GC | Performed by: OBSTETRICS & GYNECOLOGY

## 2024-12-04 PROCEDURE — 250N000009 HC RX 250: Performed by: OBSTETRICS & GYNECOLOGY

## 2024-12-04 PROCEDURE — 999N000248 HC STATISTIC IV INSERT WITH US BY RN

## 2024-12-04 PROCEDURE — 250N000013 HC RX MED GY IP 250 OP 250 PS 637: Performed by: NURSE PRACTITIONER

## 2024-12-04 PROCEDURE — 250N000009 HC RX 250

## 2024-12-04 PROCEDURE — 83735 ASSAY OF MAGNESIUM: CPT

## 2024-12-04 PROCEDURE — 258N000003 HC RX IP 258 OP 636: Performed by: OBSTETRICS & GYNECOLOGY

## 2024-12-04 PROCEDURE — 85018 HEMOGLOBIN: CPT

## 2024-12-04 PROCEDURE — 84132 ASSAY OF SERUM POTASSIUM: CPT | Performed by: OBSTETRICS & GYNECOLOGY

## 2024-12-04 RX ORDER — POTASSIUM PHOS IN 0.9 % NACL 15MMOL/250
15 PLASTIC BAG, INJECTION (ML) INTRAVENOUS ONCE
Status: COMPLETED | OUTPATIENT
Start: 2024-12-04 | End: 2024-12-04

## 2024-12-04 RX ORDER — MAGNESIUM SULFATE HEPTAHYDRATE 40 MG/ML
2 INJECTION, SOLUTION INTRAVENOUS ONCE
Status: COMPLETED | OUTPATIENT
Start: 2024-12-04 | End: 2024-12-04

## 2024-12-04 RX ADMIN — OXYCODONE HYDROCHLORIDE 5 MG: 5 TABLET ORAL at 09:22

## 2024-12-04 RX ADMIN — OXYCODONE HYDROCHLORIDE 5 MG: 5 TABLET ORAL at 02:50

## 2024-12-04 RX ADMIN — DEXTROSE MONOHYDRATE 25 ML: 25 INJECTION, SOLUTION INTRAVENOUS at 03:44

## 2024-12-04 RX ADMIN — THIAMINE HYDROCHLORIDE 250 MG: 100 INJECTION, SOLUTION INTRAMUSCULAR; INTRAVENOUS at 11:57

## 2024-12-04 RX ADMIN — OXYCODONE HYDROCHLORIDE 5 MG: 5 TABLET ORAL at 16:00

## 2024-12-04 RX ADMIN — POTASSIUM PHOSPHATE, MONOBASIC POTASSIUM PHOSPHATE, DIBASIC 15 MMOL: 224; 236 INJECTION, SOLUTION, CONCENTRATE INTRAVENOUS at 12:06

## 2024-12-04 RX ADMIN — ACETAMINOPHEN 650 MG: 325 TABLET, FILM COATED ORAL at 08:48

## 2024-12-04 RX ADMIN — POTASSIUM PHOSPHATE, MONOBASIC POTASSIUM PHOSPHATE, DIBASIC 15 MMOL: 224; 236 INJECTION, SOLUTION, CONCENTRATE INTRAVENOUS at 02:55

## 2024-12-04 RX ADMIN — CALCIUM CARBONATE 600 MG (1,500 MG)-VITAMIN D3 400 UNIT TABLET 1 TABLET: at 17:08

## 2024-12-04 RX ADMIN — CYANOCOBALAMIN TAB 500 MCG 500 MCG: 500 TAB at 08:48

## 2024-12-04 RX ADMIN — OXYCODONE HYDROCHLORIDE 5 MG: 5 TABLET ORAL at 22:41

## 2024-12-04 RX ADMIN — DEXTROSE MONOHYDRATE 25 ML: 25 INJECTION, SOLUTION INTRAVENOUS at 00:26

## 2024-12-04 RX ADMIN — DEXTROSE MONOHYDRATE 50 ML: 25 INJECTION, SOLUTION INTRAVENOUS at 04:24

## 2024-12-04 RX ADMIN — POTASSIUM & SODIUM PHOSPHATES POWDER PACK 280-160-250 MG 1 PACKET: 280-160-250 PACK at 20:50

## 2024-12-04 RX ADMIN — Medication 3 MG: at 22:41

## 2024-12-04 RX ADMIN — ERTAPENEM SODIUM 1 G: 1 INJECTION, POWDER, LYOPHILIZED, FOR SOLUTION INTRAMUSCULAR; INTRAVENOUS at 05:07

## 2024-12-04 RX ADMIN — APIXABAN 10 MG: 5 TABLET, FILM COATED ORAL at 08:45

## 2024-12-04 RX ADMIN — APIXABAN 10 MG: 5 TABLET, FILM COATED ORAL at 20:50

## 2024-12-04 RX ADMIN — ACETAMINOPHEN 650 MG: 325 TABLET, FILM COATED ORAL at 20:50

## 2024-12-04 RX ADMIN — MAGNESIUM SULFATE HEPTAHYDRATE 2 G: 2 INJECTION, SOLUTION INTRAVENOUS at 09:12

## 2024-12-04 RX ADMIN — ACETAMINOPHEN 650 MG: 325 TABLET, FILM COATED ORAL at 16:00

## 2024-12-04 RX ADMIN — DEXTROSE MONOHYDRATE 25 ML: 25 INJECTION, SOLUTION INTRAVENOUS at 03:11

## 2024-12-04 RX ADMIN — Medication 60 ML: at 09:22

## 2024-12-04 RX ADMIN — CALCIUM CARBONATE 600 MG (1,500 MG)-VITAMIN D3 400 UNIT TABLET 1 TABLET: at 08:48

## 2024-12-04 RX ADMIN — Medication 1 TABLET: at 08:48

## 2024-12-04 RX ADMIN — DEXTROSE MONOHYDRATE 25 ML: 25 INJECTION, SOLUTION INTRAVENOUS at 00:02

## 2024-12-04 ASSESSMENT — ACTIVITIES OF DAILY LIVING (ADL)
ADLS_ACUITY_SCORE: 80
ADLS_ACUITY_SCORE: 74
ADLS_ACUITY_SCORE: 75
ADLS_ACUITY_SCORE: 74
ADLS_ACUITY_SCORE: 80
ADLS_ACUITY_SCORE: 75
ADLS_ACUITY_SCORE: 74
ADLS_ACUITY_SCORE: 80
ADLS_ACUITY_SCORE: 74
ADLS_ACUITY_SCORE: 75
ADLS_ACUITY_SCORE: 74
ADLS_ACUITY_SCORE: 76
ADLS_ACUITY_SCORE: 74
ADLS_ACUITY_SCORE: 75
ADLS_ACUITY_SCORE: 76
ADLS_ACUITY_SCORE: 74
ADLS_ACUITY_SCORE: 75
ADLS_ACUITY_SCORE: 75

## 2024-12-04 NOTE — PROGRESS NOTES
[facility]  WO Nurse Inpatient Assessment     Consulted for: sacrum    Summary: Chronic PI, site of previously healed PI, open to stage 2. 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    Skin:  # Decubitus ulcer  - WOC consulted     Assessment:      Areas visualized during today's visit: Focused:, Sacrum/coccyx     Pressure Injury Location: Coccyx     11/8 12/4    Last photo: 12/4  Wound type: Pressure Injury and Shear, MASD/IAD     Pressure Injury Stage: site of previously healed pressure injury, unknown previous stage.  present on admission       Wound history/plan of care:   Pt well known to the [facility] service. Pt has chronic skin break down to sacrum/coccyx area. Per Pt she spends most of her time in her recliner  and wheelchair, has cushion for recliner and wheelchair. She reports weakness and needing assistance for repositioning and mobilizing Yaa-wound skin is a mixture of scar tissue and new epithelial tissue. Redness that is visible and blanchable, Incontinence associated skin breakdown, MASD to bilateral inner buttocks. Patient not repositioning self without assistance form nursing staff. Discussed importance of relieving pressure over bony prominences. She reports weakness. Wound has scattered open areas over sacra tissue, evolving likley related to continued incontinence episodes.      Wound  base: 100 % dermis and fibrin     Palpation of the wound bed: normal      Drainage: none     Description of drainage: none     Measurements (length x width x depth, in cm) 3 x 3 x 0.2 cm multiple  small open areas within this dimension     Tunneling N/A     Undermining N/A  Periwound skin: Scar tissue      Color: normal and consistent with surrounding tissue      Temperature: normal   Odor: none  Pain: moderate, sharp and tender  Pain intervention prior to dressing change: slow and gentle cares   Treatment goal: Heal  and Protection  STATUS: evolving  Supplies ordered: at bedside     My PI Risk Assessment     Sensory Perception: 3 - Slightly Limited     Moisture: 3 - Occasionally moist      Activity: 2-chairfast     Mobility: 2 - Very limited     Nutrition: 3 - Adequate     Friction/Shear: 1 - Problem     TOTAL: 14      Treatment Plan:     sacrum  Q shift, Every 3 days and as needed  Peel back dressing Q shift for evaluation, reapply or change as needed   Cleanse the area with NS and pat dry.  Apply No sting film barrier to periwound skin.  Dust open areas with ostomy powder and pat into place.   Cover wound with Sacral Mepilex (#498573)  Turn and reposition Q 2hrs side to side only.  Ensure pt has Tanner-cushion while sitting up in the chair.  FYI- If pt has constant incontinent loose stools needing dressing changes Q shift please discontinue the Mepilex dressing and apply criticaid barrier paste BID and PRN.      Perianal: BID and as needed  Cleanse the area with Marry cleanse and protect, very gently with soft cloth.  Apply THIN layer of critic aid paste on top of it.  With repeat application, do not scrub the paste, only remove soiled paste and reapply.  If complete removal of paste is necessary use baby oil/mineral oil (#381639) and soft wash cloth.  Ensure pt has Tanner-cushion (#691731) while sitting up in the chair.  Use only one Covidien pad in between mattress and pt. No brief while in bed.     Pressure Injury  "Prevention (PIP) Plan:  If patient is declining pressure injury prevention interventions: Explore reason why and address patient's concerns, Educate on pressure injury risk and prevention intervention(s), If patient is still declining, document \"informed refusal\" , and Ensure Care team is aware ( provider, charge nurse, etc)  Mattress: Follow bed algorithm, add Low Air Loss (Air+) mattress pump if skin is very moist or constantly moist.   HOB: Maintain at or below 30 degrees, unless contraindicated  Repositioning in bed: Every 1-2 hours , Left/right positioning; avoid supine, Raise foot of bed prior to raising head of bed, to reduce patient sliding down (shear), and Frequent microturns using positioning wedges, as patient tolerates  Heels: Keep elevated off mattress, Pillows under calves, and Heel lift boots  Protective Dressing: Sacral Mepilex for prevention (#869250),  especially for the agitated patient   Positioning Equipment:Positioning wedges (#861544) to help maintain 30 degree side lying position   Chair positioning: Chair cushion (#519190) , Assist patient to reposition hourly, and Do NOT use a donut for sitting (this increases pressure to smaller area and creates a higher potential for injury)    If patient has a buttock pressure injury, or high risk for PI use chair cushion or SPS.  Moisture Management: Perineal cleansing /protection: Follow Incontinence Protocol, Avoid brief in bed, Clean and dry skin folds with bathing , Consider InterDry (#107435) between folds, and Moisturize dry skin  Under Devices: Inspect skin under all medical devices during skin inspection , Ensure tubes are stabilized without tension, and Ensure patient is not lying on medical devices or equipment when repositioned  Ask provider to discontinue device when no longer needed.      Orders: Reviewed and Updated    RECOMMEND PRIMARY TEAM ORDER: None, at this time  Education provided: importance of repositioning, plan of care, wound " progress, Infection prevention , Moisture management, Hygiene, and Off-loading pressure  Discussed plan of care with: Patient and Nurse  WO nurse follow-up plan: weekly  Notify WOC if wound(s) deteriorate.  Nursing to notify the Provider(s) and re-consult the WOC Nurse if new skin concern.    DATA:     Current support surface: Standard  Low air loss (RADU pump, Isolibrium, Pulsate)  Containment of urine/stool: Incontinence Protocol and Incontinent pad in bed  BMI: Body mass index is 32.14 kg/m .   Active diet order: Orders Placed This Encounter      Soft & Bite Sized Diet (level 6) Thin Liquids (level 0)     Output: I/O last 3 completed shifts:  In: 2177 [P.O.:240; I.V.:7; NG/GT:780]  Out: 1150 [Urine:1150]     Labs:   Recent Labs   Lab 12/04/24  0501 12/01/24  1525 12/01/24  0402   ALBUMIN  --   --  2.5*   HGB 7.5*   < > 7.5*   WBC 5.6   < > 8.4    < > = values in this interval not displayed.     Pressure injury risk assessment:   Sensory Perception: 3-->slightly limited  Moisture: 3-->occasionally moist  Activity: 2-->chairfast  Mobility: 2-->very limited  Nutrition: 2-->probably inadequate  Friction and Shear: 1-->problem  Yogi Score: 13    Yoli Montes RN, BSN, CWOCN   Pager no longer is use, please contact through [place] group: [facility] Nurse [place]  Dept. Office Number: [phone]

## 2024-12-04 NOTE — PLAN OF CARE
End of shift report:   Patient remains A+O x4, able to make needs known. Lytes replaced per orders. Heparin off at 8pm and eliquis starting tonight. Bloody output from neph tube and catheter, Gyn/Onc paged. Scheduled and PRN pain meds for abd pain. PRN compazine given for nausea. Diarrhea x3, perineal excoriation, WOCRN following, Q2hr turns. No appetite and feeling very cold, heating pad ordered. Per daughter patient does not have a lot of appetite at baseline since chemo treatment and take medicine for nausea at home. Lymphedema wraps placed. Daughter has been updated. Possible NJ tube placement in xray per orders today. Continue current POC.     Problem: Risk for Delirium  Goal: Improved Attention and Thought Clarity  Outcome: Progressing     Problem: Confusion Acute  Goal: Optimal Cognitive Function  Outcome: Progressing  Intervention: Minimize Contributing Factors  Recent Flowsheet Documentation  Taken 12/3/2024 0900 by Felisa Gastelum RN  Environment Familiarity/Consistency:   daily routine followed   personal clothing/items utilized     Problem: Acute Kidney Injury/Impairment  Goal: Fluid and Electrolyte Balance  Outcome: Progressing  Intervention: Monitor and Manage Fluid Electrolyte Balance  Recent Flowsheet Documentation  Taken 12/3/2024 0900 by Felisa Gastelum RN  Fluid/Electrolyte Management:   fluids provided   electrolyte supplement adjusted  Goal: Improved Oral Intake  Outcome: Progressing     Problem: Pain Acute  Goal: Optimal Pain Control and Function  Outcome: Progressing  Intervention: Optimize Psychosocial Wellbeing  Recent Flowsheet Documentation  Taken 12/3/2024 0900 by Felisa Gastelum RN  Supportive Measures:   active listening utilized   verbalization of feelings encouraged   self-responsibility promoted   goal-setting facilitated   decision-making supported   positive reinforcement provided  Intervention: Develop Pain Management Plan  Recent Flowsheet Documentation  Taken  12/3/2024 1605 by Felisa Gastelum, RN  Pain Management Interventions: medication (see MAR)  Taken 12/3/2024 1100 by Felisa Gastelum RN  Pain Management Interventions: medication (see MAR)  Taken 12/3/2024 0900 by Felisa Gastelum RN  Pain Management Interventions: medication (see MAR)  Intervention: Prevent or Manage Pain  Recent Flowsheet Documentation  Taken 12/3/2024 0900 by Felisa Gastelum RN  Medication Review/Management: medications reviewed

## 2024-12-04 NOTE — PROGRESS NOTES
Interval progress note    At bedside due to refractory hypoglycemia. RN supervisor suggested drawing blood from the port for POCT glucose check. Patient amenable, and level returned elevated at 135. When taken from a finger, level was 80. Will have all blood sugars drawn from the port in the future. Updated Dr. Shin.    Ashwin Mathis MD  Gynecologic-Oncology, PGY-2   12/04/2024 5:07 AM    Gyn-Onc pager: [phone]

## 2024-12-04 NOTE — PROGRESS NOTES
Interval progress note    The patient's blood glucose level was found to be 64 at her scheduled BG check. The patient was treated with multiple rounds of D50 with the lowest BG being 49. During this hypoglycemic episode, the patient reported no sweating, weakness, tremor, hunger, irritability, or confusion. Her glucose was corrected with 25 mL pushes of D50. It peaked at 107, after which it decreased to 76 an hour later. Recheck 15 minutes after that was 81. Patient continued to get tube feeds at 50 mL/hr during this time. 1 hour after normal BG, she became hypoglycemic again to 59. Given the patient is so fluid overloaded, will opt to treat with D50 pushes at this time instead of an IV infusion of d5 or d10. Will continue to monitor. Discussed with gyn-onc fellow Dr. Shin.     Ashwin Mathis MD  Gynecologic-Oncology, PGY-2   12/04/2024 3:04 AM    Gyn-Onc pager: [phone]

## 2024-12-04 NOTE — PLAN OF CARE
Goal Outcome Evaluation:           Overall Patient Progress: decliningOverall Patient Progress: declining    Outcome Evaluation: BGs continue to trend down needing frequent D50 administration.    Neuro: A&Ox4. Forgetful.   Cardiac: Not on tele. VSS.   Respiratory: Sating >92% on RA. Poor circulation needing noseclip pulse ox sensor.   GI/: Adequate urine output via suprapubic cath and nephrostomy tube, bright red with clearing throughout shift for suprapubic, nephrostomy output remained bright red. MD aware and no concerns expressed as is was pretty much baseline.  BM X1 (loose watery).  Diet/appetite: On soft and bite sized diet, poor appetite. On CTF @ 50 ml/hr, 30q4 FWF.   Activity:  Lift assist.   Pain: At acceptable level on current regimen. PRN Oxy given round the clock.   Skin: No new deficits noted.  LDA's: R chest port SL, R PIV running phos replacement.  Blood sugars: Continually trended down throughout shift requiring multiple D50 administration. Consulted flyer Faith and got recommendation to check BG from port, discrepancy between port @ 5am was 152 vs 80 (fingerstick). MD aware and put order to check BG via port only.     Plan: Phos recheck @ 10am. Continue with POC. Notify primary team with changes.

## 2024-12-04 NOTE — PROGRESS NOTES
Brief progress note    Presented to bedside for PM rounds. Patient has some diffuse abdominal pain that is stable and at her baseline. She declines tylenol because she states it makes her feel weird, and did not elaborate further. She received 5mg of oxycodone to help control the pain. No nausea, vomiting, chest pain, difficulty breathing, tolerating tube feeds appropriately. Abdomen soft, diffusely tender, suprapubic catheter insertion site with gauze covering, patient declines closer exam. Continuing to tolerate antibiotics and DOAC. Will continue to monitor.    Ashwin Mathis MD  Gynecologic-Oncology, PGY-2   12/03/2024 10:01 PM    Gyn-Onc pager: [phone]

## 2024-12-04 NOTE — PROGRESS NOTES
CLINICAL NUTRITION SERVICES - REASSESSMENT NOTE     Nutrition Prescription    RECOMMENDATIONS FOR MDs/PROVIDERS TO ORDER:  Minimal intake goal ~1100 Kcals before considering removal of FT (65% of avg est needs)    Malnutrition Status:    Severe malnutrition in the context of chronic illness (meets criteria for both acute and chronic)    Recommendations already ordered by Registered Dietitian (RD):  Aj cts 12/5-12/7  Modified ONS order per pt flavor preference  Room service with assistance to begin  TFs at goal, continue (severe malnutrition, very low PO so far)    Future/Additional Recommendations:  Monitor nutrition-related findings and follow pt per protocol     EVALUATION OF THE PROGRESS TOWARD GOALS   Diet: Level 6: Soft & Bite-Sized Dysphagia Diet   Supplement: Ensure Max daily at breakfast; Ensure Enlive at lunch; Magic Cup at dinner      PO Intake: Minimal intake documented to I/O since last assessment. Was NPO for majority of past week per chart review; advanced to full liquid 11/30, advanced to soft/bite sized 12/3. Patient reports fair appetite. Has a nutrition shake at bedside (and several on beside table); dislikes the flavors being sent currently, so will modify order. Encouraged PO intake, starting calorie counts to begin tomorrow. Will begin room service assist to ensure to missed meal orders. Continues on TFs.     Nutrition Support:   -Dosing weight: 57.4 kg (adj BW)   -EN access via large bore NGT (Note: Primary team placed order for RN flier to attempt small bore FT, may need to be completed by radiology d/t hx gastric bypass)   -Regimen:   11/28-11/30: trickle feeds only Osmolite @ 10 mL/hr. I put recs in note but feel free to make changes.   11/30-present: Osmolite 1.5 @ 50 mL/hr x 24 hrs + 1 pkt Prosource TF20 providing 1200 mL, 1880 kcal (33 kcal/kg), 96 g protein (1.7 g/kg), 248 g CHO, 0 g fiber, and 914 mL free water per DW of 57.4 kg.    -Modulars: Prosource TF20 1 pkt daily    -FWF 30 mL  "Q4hrs (180 mL/day) + 914 mL from TF for total free water provision of 1094 mL/day    EN Intake - Average 7-day TF intake: 448 mL formula, 1 packets Prosource =  752 Kcals (13 Kcal/kg), and 48 g pro (0.8 g/kg). This is meeting 52% of low-end est Kcal needs, and 56% of low-end est protein needs.     NEW FINDINGS   General:  Tx from ICU to IMC 12/3  Large for FT to be swapped for small bore per MD; will need to be done by radiology per flier RN (released order to radiology, bedside RN aware).      Weights:   Weight up compared to admission, however all measures obtained via bed scale and additionally, patient currently presenting with 4+ edema to bilateral LEs so current weight trends likely not accurate.  Per initial RD note 11/27 \"8.7 kg (11.6%) weight loss over 4 months\"   Weights for present admit:   Date/Time Weight Weight Method   12/04/24 0607 82.3 kg (181 lb 7 oz) Bed scale   12/03/24 0000 79.3 kg (174 lb 13.2 oz) Bed scale   12/01/24 0300 78 kg (171 lb 15.3 oz) Bed scale   11/29/24 0000 76 kg (167 lb 8.8 oz) Bed scale   11/28/24 0000 75.6 kg (166 lb 10.7 oz) Bed scale   11/27/24 0500 72.6 kg (160 lb 0.9 oz) Bed scale       LABS: Reviewed  - Phos replacement orders in place (high replacement protocol)   Electrolytes  Potassium   Date Value   12/04/2024 3.8 mmol/L   12/03/2024 3.7 mmol/L   12/03/2024 3.1 mmol/L (L)   05/16/2019 4.3 mEq/L   01/26/2015 4.6 mmol/L     Potassium POCT (mmol/L)   Date Value   07/12/2024 3.2 (L)   07/12/2024 2.7 (L)     Phosphorus (mg/dL)   Date Value   12/04/2024 2.3 (L)   12/03/2024 1.9 (L)   12/03/2024 1.7 (L)   12/02/2024 2.8   12/01/2024 3.5    Blood Glucose  Glucose (mg/dL)   Date Value   12/04/2024 151 (H)   05/16/2019 92   01/26/2015 93     GLUCOSE BY METER POCT (mg/dL)   Date Value   12/04/2024 96   12/04/2024 80   12/04/2024 47 (LL)   12/04/2024 54 (L)   12/04/2024 59 (L)     Hemoglobin A1C (%)   Date Value   09/21/2024 5.0    Inflammatory Markers  WBC (10e9/L)   Date Value " "  01/26/2015 3.9 (L)     WBC Count (10e3/uL)   Date Value   12/04/2024 5.6   12/03/2024 5.0   12/02/2024 6.9     Albumin (g/dL)   Date Value   12/01/2024 2.5 (L)   11/27/2024 1.8 (L)   11/26/2024 1.8 (L)      Magnesium (mg/dL)   Date Value   12/04/2024 1.8   12/03/2024 2.1   12/02/2024 2.2     Sodium (mmol/L)   Date Value   12/04/2024 145   12/03/2024 143   12/02/2024 138   01/26/2015 140    Renal  Urea Nitrogen (mg/dL)   Date Value   12/04/2024 27.5 (H)   12/03/2024 33.4 (H)   12/02/2024 33.4 (H)   01/26/2015 8     Creatinine (mg/dL)   Date Value   12/04/2024 1.09 (H)   12/03/2024 1.40 (H)   12/02/2024 1.67 (H)   05/16/2019 0.64   01/26/2015 0.63     Additional  Triglycerides (mg/dL)   Date Value   09/21/2024 108     Ketones Urine (mg/dL)   Date Value   11/29/2024 Negative   01/18/2021 Negative        GI:  Last BM: 12/3 x4-5 measures   Trending 0-8 unmeasured stool occurences per day, based on I/O review  GI concerns: None      Pertinent Medications  Caltrate 1 tablet BID   Mesa's MVI, 1 tablet daily   B12 500 mcg daily   Low sliding scale insulin TID before meals and at HS  Prosource TF20 1 pkt daily   Thiamine 100 mg daily   Bowel meds: Miralax scheduled BID (not given today, and stooling daily); Senokot scheduled BID \" \"     SKIN:  Most recent WOCN note today - see for detail if needed  Coccyx - site of healed pressure injury of unknown previous stage and shear, MASD/IAD - status evolving    MALNUTRITION  % Intake: < 75% for > 7 days (moderate) - Based on I/O TF intake  % Weight Loss:  > 10% in 6 months (severe malnutrition) -PTA, current weight trends confounded by pt fluid status   Subcutaneous Fat Loss: Facial region:  moderate, Upper arm:  mild, Lower arm:  mild, and Thoracic/intercostal:  moderate   Muscle Loss: Temporal:  mild to moderate, Thoracic region (clavicle, acromium bone, deltoid, trapezius, pectoral):  moderate, Upper arm (bicep, tricep):  mild, and Lower arm  (forearm):  mild  Fluid " Accumulation/Edema: Severe (4+)   Malnutrition Diagnosis: Severe malnutrition in the context of chronic illness (meets criteria for both acute and chronic)    Previous Goals   Diet adv v nutrition support within 48 hours of admission to ICU.   Evaluation: Met    Previous Nutrition Diagnosis  Malnutrition related to inadequate PO, malabsorptive anatomy as evidenced by patient report, fat and muscle loss, weight loss, chart review.   Evaluation: No change    CURRENT NUTRITION DIAGNOSIS  Inadequate oral intake related to reduced appetite, dysphagia as evidenced by diet adv yesterday per SLP (IDDSI 6), TFs as primary nutrition source w/ usha cts to better assess PO adequacy    INTERVENTIONS  Implementation  See top of note     Goals  Total avg nutritional intake to meet a minimum of 25 kcal/kg and 1.5 g PRO/kg daily (per dosing wt 57.4 kg).    Monitoring/Evaluation  Progress toward goals will be monitored and evaluated per protocol.    Ye Philip, MS, RDN, LD, CNSC  Available by [email or web address] or phone   Vocera: M-F (7:00-3:30)  6B Clinical Dietitian  or 1A Obs Clinical Dietitian  Weekend/Holiday (7:00-3:30) - Weekend Clinical Dietitian   6B RD desk: [phone]       **Clinical Dietitians are no longer available by pager.

## 2024-12-04 NOTE — PROGRESS NOTES
Gynecology Oncology Progress Note  12/04/2024    Ms. 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 Awake and oriented. Currently overall comfortable, reports continued abdominal pain, at her baseline. No headache, blurry vision, chest pain, SOB, difficulty breathing. Tube feeds are at 50 mL, patient only ate a little bit of food yesterday, no nausea/vomiting. Overnight the patient had blood glucose finger-sticks done which were consistently being read as hypoglycemic. She was given 25 mL D50 for correction; however remained asymptomatic. The patient had a value drawn from her port, which returned at 55 higher than what the fingerstick was measuring (135 vs 80).     Objective:   Patient Vitals for the past 24 hrs:   BP Temp Temp src Pulse Resp SpO2 Weight   12/04/24 1150 119/67 97.7  F (36.5  C) Oral -- 20 94 % --   12/04/24 0733 96/80 97.5  F (36.4  C) Oral 86 18 97 % --   12/04/24 0607 -- -- -- -- -- -- 82.3 kg (181 lb 7 oz)   12/04/24 0300 97/61 99.3  F (37.4  C) Axillary -- 18 100 % --   12/03/24 2306 114/72 -- -- -- 18 -- --   12/03/24 2304 -- 99.1  F (37.3  C) Axillary -- -- -- --   12/03/24 1942 103/62 97.7  F (36.5  C) Oral 83 18 98 % --   12/03/24 1605 105/64 98.6  F (37  C) Oral -- 18 93 % --      General: cachectic-appearing female, in NAD, NG tube in place, under multiple layers of blankets  CV: Regular rate  Resp: Non-labored breathing on room air  Abdomen: Soft, moderately tender in the supra-pubic area around catheter, non-distended  Extremities: 2+ bilateral lower extremity edema, wrapped  with ace-bandages  Lines/Drains: Supra-pubic catheter site non-erythematous and suprapubic catheter is draining pink-tinged yellow urine. L PNT with bloody appearing urine     Drains: suprapubic catheter, port, pIV, PNT (L)    Intake (24h//since MN)  PO: 0//120  IV: 0 mL//72.33 mL  Enteral: 1,150 mL//300 mL    Output:  PNT: 320 mL//50 mL  Suprapubic cath: 770 mL//225 mL  Stool: 5x//0x    UOp: 0.57 ml/kg/hr    Recent Results (from the past 24 hours)   Glucose by meter    Collection Time: 12/03/24  4:42 PM   Result Value Ref Range    GLUCOSE BY METER POCT 74 70 - 99 mg/dL   Partial thromboplastin time    Collection Time: 12/03/24  4:46 PM   Result Value Ref Range    aPTT 77 (H) 22 - 38 Seconds   Potassium    Collection Time: 12/03/24  4:46 PM   Result Value Ref Range    Potassium 3.7 3.4 - 5.3 mmol/L   Glucose by meter    Collection Time: 12/03/24 10:17 PM   Result Value Ref Range    GLUCOSE BY METER POCT 64 (L) 70 - 99 mg/dL   Phosphorus    Collection Time: 12/03/24 10:29 PM   Result Value Ref Range    Phosphorus 1.9 (L) 2.5 - 4.5 mg/dL   Glucose by meter    Collection Time: 12/03/24 10:50 PM   Result Value Ref Range    GLUCOSE BY METER POCT 49 (LL) 70 - 99 mg/dL   Glucose by meter    Collection Time: 12/03/24 11:20 PM   Result Value Ref Range    GLUCOSE BY METER POCT 55 (L) 70 - 99 mg/dL   Glucose by meter    Collection Time: 12/03/24 11:50 PM   Result Value Ref Range    GLUCOSE BY METER POCT 73 70 - 99 mg/dL   Glucose by meter    Collection Time: 12/04/24 12:24 AM   Result Value Ref Range    GLUCOSE BY METER POCT 80 70 - 99 mg/dL   Glucose by meter    Collection Time: 12/04/24 12:51 AM   Result Value Ref Range    GLUCOSE BY METER POCT 107 (H) 70 - 99 mg/dL   Glucose by meter    Collection Time: 12/04/24  1:42 AM   Result Value Ref Range    GLUCOSE BY METER POCT 76 70 - 99 mg/dL   Glucose by meter    Collection Time: 12/04/24  2:15 AM   Result Value Ref Range    GLUCOSE BY METER POCT 81 70 - 99 mg/dL   Glucose by  meter    Collection Time: 12/04/24  3:05 AM   Result Value Ref Range    GLUCOSE BY METER POCT 59 (L) 70 - 99 mg/dL   Glucose by meter    Collection Time: 12/04/24  3:42 AM   Result Value Ref Range    GLUCOSE BY METER POCT 54 (L) 70 - 99 mg/dL   Glucose by meter    Collection Time: 12/04/24  4:18 AM   Result Value Ref Range    GLUCOSE BY METER POCT 47 (LL) 70 - 99 mg/dL   Glucose by meter    Collection Time: 12/04/24  4:57 AM   Result Value Ref Range    GLUCOSE BY METER POCT 80 70 - 99 mg/dL   Magnesium    Collection Time: 12/04/24  5:01 AM   Result Value Ref Range    Magnesium 1.8 1.7 - 2.3 mg/dL   Phosphorus    Collection Time: 12/04/24  5:01 AM   Result Value Ref Range    Phosphorus 2.3 (L) 2.5 - 4.5 mg/dL   Basic metabolic panel    Collection Time: 12/04/24  5:01 AM   Result Value Ref Range    Sodium 145 135 - 145 mmol/L    Potassium 3.8 3.4 - 5.3 mmol/L    Chloride 114 (H) 98 - 107 mmol/L    Carbon Dioxide (CO2) 25 22 - 29 mmol/L    Anion Gap 6 (L) 7 - 15 mmol/L    Urea Nitrogen 27.5 (H) 8.0 - 23.0 mg/dL    Creatinine 1.09 (H) 0.51 - 0.95 mg/dL    GFR Estimate 54 (L) >60 mL/min/1.73m2    Calcium 7.3 (L) 8.8 - 10.4 mg/dL    Glucose 151 (H) 70 - 99 mg/dL   CBC with platelets    Collection Time: 12/04/24  5:01 AM   Result Value Ref Range    WBC Count 5.6 4.0 - 11.0 10e3/uL    RBC Count 2.43 (L) 3.80 - 5.20 10e6/uL    Hemoglobin 7.5 (L) 11.7 - 15.7 g/dL    Hematocrit 23.8 (L) 35.0 - 47.0 %    MCV 98 78 - 100 fL    MCH 30.9 26.5 - 33.0 pg    MCHC 31.5 31.5 - 36.5 g/dL    RDW 17.7 (H) 10.0 - 15.0 %    Platelet Count 95 (L) 150 - 450 10e3/uL   Glucose by meter    Collection Time: 12/04/24  9:09 AM   Result Value Ref Range    GLUCOSE BY METER POCT 96 70 - 99 mg/dL   Phosphorus    Collection Time: 12/04/24  9:49 AM   Result Value Ref Range    Phosphorus 2.2 (L) 2.5 - 4.5 mg/dL   Potassium    Collection Time: 12/04/24  9:49 AM   Result Value Ref Range    Potassium 4.0 3.4 - 5.3 mmol/L   Glucose by meter    Collection  Time: 12/04/24 12:06 PM   Result Value Ref Range    GLUCOSE BY METER POCT 104 (H) 70 - 99 mg/dL     Assessment/Plan: Alis Hartman is a 69 year old female with serous endometrial adenocarcinoma of uterus, currently s/p 3 cycles carboplatin/paclitaxel who was admitted with mental status changes attributed to UTI/urosepsis. She was weaned off of pressors in the evening of 12/1, and has been able to maintain an appropriate blood pressure since. Today's goals include encouraging PO intake.    # Urosepsis  -To romplete 10 day course of ertepenem given previous ESBL infections per ID.      # Oliguria, Improving   # MADHU, improving  # CKD stage 3a  -Cr is at 1.09 which is near her normal of 1.0, and her UOP was adequate overnight.   -Continue to avoid nephrotoxic medications    # L subclavian/axillary non occlusive DVT around PICC  -Transitioned to PO anticoagulation yesterday, D#2/7 apixaban 10mg BID which will be transitioned to Apixaban 5mg BID indefinitely.    # Serous endometrial adenocarcinoma of uterus  - No further systemic therapy planned per pt's primary oncologist given multiple admissions and poor functional status    #Severe Malnutrition  #Hypokalemia  #Hypophosphatemia  -Patient with phos 1.9 last night after PO repletion. Repleting with IV phos and will redraw level per RN-driven protocol.  -Replace electrolytes PRN  -Encourage PO intake  -Will change NG to NJ tube     # Atrial fibrillation on eliquis (held)  # Concern for chronotropic incompetence  -Holding PTA atenolol in the setting of bradycardia and hypotension. Will re-consult after 10 days of ertapenem.  -Per Cardiology:  - Once patient is out of ICU and has been clear of infectious process by infectious disease team, could consider placement of PPM implant  - Continue to hold patient's PTA atenolol    #MDD/Schizophrenia  #Waxing and Waning Mental Status  #C/f Delirium   -Psych consult: hold ambien, seroquel, gabapentin, and zoloft.   -Psych to  "follow for medication recommendation.    #Dextrose administration  Last night fingerstick glucose levels were returning low, despite administering 25mL D50. Upon admission, this also occurred with the patient. It was then suggested to draw the lab from the port instead of fingerstick, which returned normal. The same level from fingerstick was 55 lower (135 vs 80). The patient received 100mg of dextrose between 11pm and 6am. The patient's BMP returned with a glucose of 151 and an anion gap of 6, both appropriate.      # Chronic stable conditions  - Anemia of chronic disease: Stable, transfuse to Hb >7  - Asthma: PTA albuterol   - Hx/o gastric bypass - avoid NSAIDs  - Stage 2 Sacral/Coccygeal Pressure Ulcer- WOC consulted; appreciate cares.      Code status: After discussion with patient and daughter when patient had capacity, remains full code.     Clinically Significant Risk Factors     # Obesity: Estimated body mass index is 32.14 kg/m  as calculated from the following:    Height as of this encounter: 1.6 m (5' 3\").    Weight as of this encounter: 82.3 kg (181 lb 7 oz).    # Severe Malnutrition: based on nutrition assessment      Dispo: TCU recommended. Will continue discussions with patient and family.     Patient discussed with Dr. Jon Mathis MD  Obstetrics and Gynecology, PGY-2  12/04/2024 7:14 AM     ICorby MD personally examined and evaluated this patient on 12/04/24.  I discussed the patient with the resident and care team, and agree with the assessment and plan of care as documented in the residents note above.    I personally reviewed vital signs, laboratory values and imaging results.      Lilliam Webb MD  Gynecologic Oncology  [facility]    "

## 2024-12-05 ENCOUNTER — APPOINTMENT (OUTPATIENT)
Dept: OCCUPATIONAL THERAPY | Facility: CLINIC | Age: 69
DRG: 698 | End: 2024-12-05
Payer: COMMERCIAL

## 2024-12-05 ENCOUNTER — APPOINTMENT (OUTPATIENT)
Dept: SPEECH THERAPY | Facility: CLINIC | Age: 69
DRG: 698 | End: 2024-12-05
Payer: COMMERCIAL

## 2024-12-05 ENCOUNTER — APPOINTMENT (OUTPATIENT)
Dept: PHYSICAL THERAPY | Facility: CLINIC | Age: 69
DRG: 698 | End: 2024-12-05
Payer: COMMERCIAL

## 2024-12-05 VITALS
OXYGEN SATURATION: 100 % | DIASTOLIC BLOOD PRESSURE: 84 MMHG | TEMPERATURE: 97.9 F | HEIGHT: 63 IN | BODY MASS INDEX: 31.76 KG/M2 | RESPIRATION RATE: 16 BRPM | SYSTOLIC BLOOD PRESSURE: 127 MMHG | WEIGHT: 179.23 LBS | HEART RATE: 92 BPM

## 2024-12-05 LAB
ANION GAP SERPL CALCULATED.3IONS-SCNC: 5 MMOL/L (ref 7–15)
ANION GAP SERPL CALCULATED.3IONS-SCNC: 7 MMOL/L (ref 7–15)
BUN SERPL-MCNC: 22.8 MG/DL (ref 8–23)
BUN SERPL-MCNC: 24.1 MG/DL (ref 8–23)
CALCIUM SERPL-MCNC: 7.6 MG/DL (ref 8.8–10.4)
CALCIUM SERPL-MCNC: 7.7 MG/DL (ref 8.8–10.4)
CHLORIDE SERPL-SCNC: 114 MMOL/L (ref 98–107)
CHLORIDE SERPL-SCNC: 114 MMOL/L (ref 98–107)
CREAT SERPL-MCNC: 0.82 MG/DL (ref 0.51–0.95)
CREAT SERPL-MCNC: 0.86 MG/DL (ref 0.51–0.95)
EGFRCR SERPLBLD CKD-EPI 2021: 72 ML/MIN/1.73M2
EGFRCR SERPLBLD CKD-EPI 2021: 76 ML/MIN/1.73M2
ERYTHROCYTE [DISTWIDTH] IN BLOOD BY AUTOMATED COUNT: 18.1 % (ref 10–15)
GLUCOSE BLDC GLUCOMTR-MCNC: 101 MG/DL (ref 70–99)
GLUCOSE BLDC GLUCOMTR-MCNC: 103 MG/DL (ref 70–99)
GLUCOSE BLDC GLUCOMTR-MCNC: 103 MG/DL (ref 70–99)
GLUCOSE BLDC GLUCOMTR-MCNC: 72 MG/DL (ref 70–99)
GLUCOSE BLDC GLUCOMTR-MCNC: 99 MG/DL (ref 70–99)
GLUCOSE SERPL-MCNC: 102 MG/DL (ref 70–99)
GLUCOSE SERPL-MCNC: 115 MG/DL (ref 70–99)
HCO3 SERPL-SCNC: 25 MMOL/L (ref 22–29)
HCO3 SERPL-SCNC: 26 MMOL/L (ref 22–29)
HCT VFR BLD AUTO: 24.6 % (ref 35–47)
HGB BLD-MCNC: 7.6 G/DL (ref 11.7–15.7)
MAGNESIUM SERPL-MCNC: 2 MG/DL (ref 1.7–2.3)
MCH RBC QN AUTO: 30.6 PG (ref 26.5–33)
MCHC RBC AUTO-ENTMCNC: 30.9 G/DL (ref 31.5–36.5)
MCV RBC AUTO: 99 FL (ref 78–100)
PHOSPHATE SERPL-MCNC: 2.7 MG/DL (ref 2.5–4.5)
PHOSPHATE SERPL-MCNC: 2.8 MG/DL (ref 2.5–4.5)
PLATELET # BLD AUTO: 113 10E3/UL (ref 150–450)
POTASSIUM SERPL-SCNC: 4.7 MMOL/L (ref 3.4–5.3)
POTASSIUM SERPL-SCNC: 4.7 MMOL/L (ref 3.4–5.3)
RBC # BLD AUTO: 2.48 10E6/UL (ref 3.8–5.2)
SODIUM SERPL-SCNC: 145 MMOL/L (ref 135–145)
SODIUM SERPL-SCNC: 146 MMOL/L (ref 135–145)
WBC # BLD AUTO: 6 10E3/UL (ref 4–11)

## 2024-12-05 PROCEDURE — 250N000013 HC RX MED GY IP 250 OP 250 PS 637

## 2024-12-05 PROCEDURE — 97110 THERAPEUTIC EXERCISES: CPT | Mod: GO | Performed by: OCCUPATIONAL THERAPIST

## 2024-12-05 PROCEDURE — 250N000013 HC RX MED GY IP 250 OP 250 PS 637: Performed by: INTERNAL MEDICINE

## 2024-12-05 PROCEDURE — 84100 ASSAY OF PHOSPHORUS: CPT | Performed by: NURSE PRACTITIONER

## 2024-12-05 PROCEDURE — 99418 PROLNG IP/OBS E/M EA 15 MIN: CPT | Performed by: PSYCHIATRY & NEUROLOGY

## 2024-12-05 PROCEDURE — 85027 COMPLETE CBC AUTOMATED: CPT

## 2024-12-05 PROCEDURE — 97530 THERAPEUTIC ACTIVITIES: CPT | Mod: GP

## 2024-12-05 PROCEDURE — 120N000003 HC R&B IMCU UMMC

## 2024-12-05 PROCEDURE — 250N000013 HC RX MED GY IP 250 OP 250 PS 637: Performed by: OBSTETRICS & GYNECOLOGY

## 2024-12-05 PROCEDURE — 80048 BASIC METABOLIC PNL TOTAL CA: CPT | Performed by: NURSE PRACTITIONER

## 2024-12-05 PROCEDURE — 250N000011 HC RX IP 250 OP 636

## 2024-12-05 PROCEDURE — 83735 ASSAY OF MAGNESIUM: CPT

## 2024-12-05 PROCEDURE — 99232 SBSQ HOSP IP/OBS MODERATE 35: CPT | Mod: GC | Performed by: OBSTETRICS & GYNECOLOGY

## 2024-12-05 PROCEDURE — 92526 ORAL FUNCTION THERAPY: CPT | Mod: GN

## 2024-12-05 PROCEDURE — 99233 SBSQ HOSP IP/OBS HIGH 50: CPT | Performed by: PSYCHIATRY & NEUROLOGY

## 2024-12-05 PROCEDURE — 84100 ASSAY OF PHOSPHORUS: CPT

## 2024-12-05 PROCEDURE — 80048 BASIC METABOLIC PNL TOTAL CA: CPT

## 2024-12-05 PROCEDURE — 250N000013 HC RX MED GY IP 250 OP 250 PS 637: Performed by: NURSE PRACTITIONER

## 2024-12-05 PROCEDURE — 97530 THERAPEUTIC ACTIVITIES: CPT | Mod: GO | Performed by: OCCUPATIONAL THERAPIST

## 2024-12-05 PROCEDURE — 250N000011 HC RX IP 250 OP 636: Performed by: OBSTETRICS & GYNECOLOGY

## 2024-12-05 RX ORDER — MAGNESIUM SULFATE HEPTAHYDRATE 40 MG/ML
2 INJECTION, SOLUTION INTRAVENOUS ONCE
Status: COMPLETED | OUTPATIENT
Start: 2024-12-05 | End: 2024-12-05

## 2024-12-05 RX ADMIN — OXYCODONE HYDROCHLORIDE 5 MG: 5 TABLET ORAL at 20:53

## 2024-12-05 RX ADMIN — CALCIUM CARBONATE 600 MG (1,500 MG)-VITAMIN D3 400 UNIT TABLET 1 TABLET: at 17:11

## 2024-12-05 RX ADMIN — Medication 60 ML: at 08:31

## 2024-12-05 RX ADMIN — THIAMINE HYDROCHLORIDE 250 MG: 100 INJECTION, SOLUTION INTRAMUSCULAR; INTRAVENOUS at 12:22

## 2024-12-05 RX ADMIN — CYANOCOBALAMIN TAB 500 MCG 500 MCG: 500 TAB at 08:29

## 2024-12-05 RX ADMIN — APIXABAN 10 MG: 5 TABLET, FILM COATED ORAL at 19:41

## 2024-12-05 RX ADMIN — POTASSIUM & SODIUM PHOSPHATES POWDER PACK 280-160-250 MG 1 PACKET: 280-160-250 PACK at 00:22

## 2024-12-05 RX ADMIN — ACETAMINOPHEN 650 MG: 325 TABLET, FILM COATED ORAL at 03:05

## 2024-12-05 RX ADMIN — MAGNESIUM SULFATE HEPTAHYDRATE 2 G: 2 INJECTION, SOLUTION INTRAVENOUS at 08:39

## 2024-12-05 RX ADMIN — Medication 3 MG: at 22:42

## 2024-12-05 RX ADMIN — APIXABAN 10 MG: 5 TABLET, FILM COATED ORAL at 08:30

## 2024-12-05 RX ADMIN — Medication 1 TABLET: at 08:30

## 2024-12-05 RX ADMIN — ACETAMINOPHEN 650 MG: 325 TABLET, FILM COATED ORAL at 08:30

## 2024-12-05 RX ADMIN — Medication 1 LOZENGE: at 17:11

## 2024-12-05 RX ADMIN — MAGNESIUM SULFATE HEPTAHYDRATE 2 G: 2 INJECTION, SOLUTION INTRAVENOUS at 19:44

## 2024-12-05 RX ADMIN — POTASSIUM & SODIUM PHOSPHATES POWDER PACK 280-160-250 MG 1 PACKET: 280-160-250 PACK at 22:42

## 2024-12-05 RX ADMIN — ACETAMINOPHEN 650 MG: 325 TABLET, FILM COATED ORAL at 14:36

## 2024-12-05 RX ADMIN — ERTAPENEM SODIUM 1 G: 1 INJECTION, POWDER, LYOPHILIZED, FOR SOLUTION INTRAMUSCULAR; INTRAVENOUS at 04:33

## 2024-12-05 RX ADMIN — OXYCODONE HYDROCHLORIDE 5 MG: 5 TABLET ORAL at 08:29

## 2024-12-05 RX ADMIN — CALCIUM CARBONATE 600 MG (1,500 MG)-VITAMIN D3 400 UNIT TABLET 1 TABLET: at 08:30

## 2024-12-05 RX ADMIN — OXYCODONE HYDROCHLORIDE 5 MG: 5 TABLET ORAL at 14:36

## 2024-12-05 RX ADMIN — POTASSIUM & SODIUM PHOSPHATES POWDER PACK 280-160-250 MG 1 PACKET: 280-160-250 PACK at 19:41

## 2024-12-05 ASSESSMENT — ACTIVITIES OF DAILY LIVING (ADL)
ADLS_ACUITY_SCORE: 74

## 2024-12-05 NOTE — CONSULTS
Psychiatry Consultation; Follow up              Reason for Consult, requesting source:    Review psych meds.   Requesting source: Corby Webb    Labs and imaging reviewed, discussed with nursing and social work     Total time spent in chart review, patient interview and coordination of care; 70 min            Interim history:    I had seen this 69 year old woman 12/2 (and 7/30, 8/2/24) in regards to a past diagnosis of schizophrenia. In the past she has been on Seroquel and Zoloft, but she was noncompliant with them and her daughter wanted to see how she did without her psychiatric medications since she hadn't shown any psychosis symptoms for several years. We had decided to hold psychiatric meds in August, but per pharmacist her prescriptions continued to be filled and picked up by her PCAs.     She has a diagnosis of serous endometrial adenocarcinoma and was admitted with AMS secondary urosepsis. She will complete 10 day course of ertapenem tomorrow.   She is being seen again by psychiatry to again evaluate her need for psychiatric medications; ambien, seroquel, gabapentin, and zoloft have been held.   During my past visits she had denied any thought disorder symptoms such as paranoia, delusions or hallucinations.  However, today she occasionally has thoughts versus voices telling her sometimes to harm her PCAs by hitting them. However she has never acted on the voices and she is not concerned about following through with what they say.  I could not elicit any paranoia or delusions.    She denies being depressed and does not have any other depression symptoms.   I spoke to her daughter who had visited  after I had seen Hannah. She says she asked about voices and paranoia and Aranza denied having any. Her daughter reminded me that her mother had not had any psychosis symptoms in many years. .  Also she remains reluctant to have her mother on an antipsychotic.        Current Medications:     Current  Facility-Administered Medications   Medication Dose Route Frequency Provider Last Rate Last Admin    acetaminophen (TYLENOL) tablet 650 mg  650 mg Oral Q6H Anali Nation MD   650 mg at 12/05/24 0830    apixaban ANTICOAGULANT (ELIQUIS) tablet 10 mg  10 mg Oral BID Jasmyne Ricardo DO   10 mg at 12/05/24 0830    [START ON 12/10/2024] apixaban ANTICOAGULANT (ELIQUIS) tablet 5 mg  5 mg Oral BID Jasmyne Ricardo DO        [Held by provider] atenolol (TENORMIN) half-tab 12.5 mg  12.5 mg Oral Daily Ashwin Mathis MD        [Held by provider] benztropine (COGENTIN) tablet 1 mg  1 mg Oral or Feeding Tube Daily Antonio Carpenter MD   1 mg at 12/01/24 0756    calcium carbonate-vitamin D (CALTRATE) 600-10 MG-MCG per tablet 1 tablet  1 tablet Oral or Feeding Tube BID w/meals Antonio Carpenter MD   1 tablet at 12/05/24 0830    childrens multivitamin w/iron (FLINTSTONES COMPLETE) chewable tablet 1 tablet  1 tablet Oral or Feeding Tube Daily Antonio Carpenter MD   1 tablet at 12/05/24 0830    cyanocobalamin (VITAMIN B-12) tablet 500 mcg  500 mcg Oral or Feeding Tube Daily Antonio Carpenter MD   500 mcg at 12/05/24 0829    ertapenem (INVanz) 1 g vial to attach to  mL bag  1 g Intravenous Q24H Anali Nation MD   1 g at 12/05/24 0433    [Held by provider] gabapentin (NEURONTIN) capsule 200 mg  200 mg Oral TID Antonio Carpenter MD        insulin aspart (NovoLOG) injection (RAPID ACTING)  1-3 Units Subcutaneous TID AC Anali Nation MD   1 Units at 11/27/24 1701    insulin aspart (NovoLOG) injection (RAPID ACTING)  1-3 Units Subcutaneous At Bedtime Anali Nation MD        lidocaine (viscous) (XYLOCAINE) 2 % solution 5 mL  5 mL Topical Once eKlsey Mccracken APRN CNP        magnesium sulfate 2 g in 50 mL sterile water intermittent infusion  2 g Intravenous Once Lilliam Roy MD 50 mL/hr at 12/05/24 0839 2 g at 12/05/24 0839    melatonin tablet 3 mg  3 mg Oral or Feeding Tube At Bedtime Antonio Carpenter MD   3  "mg at 12/04/24 2241    polyethylene glycol (MIRALAX) Packet 17 g  17 g Oral or Feeding Tube BID Gloria Wne MD   17 g at 12/01/24 2142    Prosource TF20 ENfit Compatibl EN LIQD (PROSOURCE TF20) packet 60 mL  1 packet Per Feeding Tube Daily Gloria Wen MD   60 mL at 12/05/24 0831    [Held by provider] QUEtiapine (SEROquel) tablet 25 mg  25 mg Oral At Bedtime Amy English CNP        senna-docusate (SENOKOT-S/PERICOLACE) 8.6-50 MG per tablet 1 tablet  1 tablet Oral BID Gloria Wen MD        Or    senna-docusate (SENOKOT-S/PERICOLACE) 8.6-50 MG per tablet 2 tablet  2 tablet Oral BID Gloria Wen MD   2 tablet at 12/01/24 2142    [Held by provider] sertraline (ZOLOFT) tablet 50 mg  50 mg Oral or Feeding Tube Daily Jojo Green APRN CNP        sodium chloride (PF) 0.9% PF flush 3 mL  3 mL Intracatheter Q8H Avery Gregory MD   3 mL at 12/05/24 0305    thiamine (B-1) injection 250 mg  250 mg Intravenous Daily Roberto Issa APRN CNP   250 mg at 12/04/24 1157    Followed by    [START ON 12/6/2024] thiamine (B-1) tablet 100 mg  100 mg Oral Daily Roberto Issa APRN CNP                MSE:   Appearance: adequately groomed and fatigued  Attitude:  cooperative  Eye Contact:  fair  Mood:  \"OK\"  Affect:  slightly restricted  Speech:  dysarthria (slight)  Psychomotor Behavior:  no evidence of tardive dyskinesia, dystonia, or tics  Muscle strength and tone: intact   Thought Process:  circumstantial  Associations:  no loose associations  Thought Content:  auditory hallucinations present  Insight:  limited  Judgement:  limited  Oriented to:  person and \"hospital\"   Attention Span and Concentration:  fair  Recent and Remote Memory:  fair     Vital signs:  Temp: 97.7  F (36.5  C) Temp src: Oral BP: 126/88 Pulse: 92   Resp: 20 SpO2: 98 % O2 Device: None (Room air) Oxygen Delivery: 3 LPM Height: 160 cm (5' 3\") Weight: 81.3 kg (179 lb 3.7 oz)  Estimated body mass index is 31.75 kg/m  as calculated from the following:   " " Height as of this encounter: 1.6 m (5' 3\").    Weight as of this encounter: 81.3 kg (179 lb 3.7 oz).           DSM-5 Diagnosis:   Past diagnosis of Schizophrenia  RAFAEL   Unspecified neurocognitive Disorder: MOCA 25/30 9/22/22  Recent delirium          Assessment:   I think it is likely that she is having some thought disorder symptoms, but they are quite mild, do not bother her and she is not acting on them if they are command hallucinations   Her daughter is quite resistant to restarting an antipsychotic again.          Summary of Recommendations:   Due to her mild symptoms and her daughter being reluctant to have her on psychiatric medications I think it is reasonable to hold off on an antidepressant or antipsychotic.     Contact me as needed.  Mian Serna M.D.   Consult Liaison Psychiatry   [facility]   Contact on Vocera  If I am not available, then [facility] line ([phone]) should know who   Is covering our consult service.       \"Much or all of the text in this note was generated through the use of Dragon Dictate voice to text software. Errors in spelling or words which appear to be out of contact are unintentional, may be present due having escaped editing\"           "

## 2024-12-05 NOTE — PROGRESS NOTES
Gynecology Oncology Progress Note  12/05/2024    Ms. Alis Hartman is a 69 year old HD#8 admitted for urosepsis and now POD#5 L PNT placement     Dz: Serous endometrial adenocarcinoma s/p SNEHA/BSO (7/12/2024) s/p Cycle 3 carbo/taxel (10/15/24)    24-hour events:   - GOC convo; stopping cancer tx   - hypophos > PO/IV repletion   - WOC saw, redressed sacral ulcer    Subjective: Aranza is doing well this morning. Awake and oriented. Currently overall comfortable, reports abdominal pain is at baseline, but her sacral ulcer is very sore. The patient's fingers are also very cold. Otherwise no concerns. No headache, blurry vision, chest pain, SOB, difficulty breathing. Tube feeds are at 50 mL/hr, and patient is motivated to take more PO today.    Objective:   Patient Vitals for the past 24 hrs:   BP Temp Temp src Pulse Resp SpO2 Weight   12/05/24 1617 124/78 99.7  F (37.6  C) Oral 81 16 100 % --   12/05/24 1208 110/71 98.2  F (36.8  C) Oral -- 15 97 % --   12/05/24 0731 126/88 97.7  F (36.5  C) Oral -- 20 98 % --   12/05/24 0500 -- -- -- -- -- -- 81.3 kg (179 lb 3.7 oz)   12/05/24 0300 113/76 97.3  F (36.3  C) Axillary -- -- 98 % --   12/05/24 0000 108/70 97.8  F (36.6  C) Oral -- -- 100 % --   12/04/24 1935 129/73 98  F (36.7  C) Oral 92 18 94 % --      General: cachectic-appearing female, in NAD, NG tube in place, under multiple layers of blankets  CV: Regular rate  Resp: Non-labored breathing on room air  Abdomen: Soft, moderately tender in the supra-pubic area around catheter, non-distended  Extremities: 2+ bilateral lower extremity edema, wrapped with ace-bandages  Lines/Drains: Supra-pubic catheter site non-erythematous and suprapubic catheter is draining light pink-tinged yellow urine. L PNT with bloody appearing urine     Drains: suprapubic catheter, port, pIV, PNT (L)    Intake (24h//since MN)  PO: 120ml//0ml  IV: 50 mL//0mL  NG/GT: 400 mL//150 mL  Enteral: 1,200 mL//300 mL    Output:  PNT: 295  mL//200mL  Suprapubic cath: 1050 mL//350 mL  Stool: 2x//1x    UOp: 1.13 ml/kg/hr    Recent Results (from the past 24 hours)   Phosphorus    Collection Time: 12/04/24  6:05 PM   Result Value Ref Range    Phosphorus 2.9 2.5 - 4.5 mg/dL   Glucose by meter    Collection Time: 12/04/24 10:38 PM   Result Value Ref Range    GLUCOSE BY METER POCT 90 70 - 99 mg/dL   Glucose by meter    Collection Time: 12/05/24  2:34 AM   Result Value Ref Range    GLUCOSE BY METER POCT 99 70 - 99 mg/dL   Glucose by meter    Collection Time: 12/05/24  6:03 AM   Result Value Ref Range    GLUCOSE BY METER POCT 101 (H) 70 - 99 mg/dL   Magnesium    Collection Time: 12/05/24  6:28 AM   Result Value Ref Range    Magnesium 2.0 1.7 - 2.3 mg/dL   Phosphorus    Collection Time: 12/05/24  6:28 AM   Result Value Ref Range    Phosphorus 2.8 2.5 - 4.5 mg/dL   Basic metabolic panel    Collection Time: 12/05/24  6:28 AM   Result Value Ref Range    Sodium 146 (H) 135 - 145 mmol/L    Potassium 4.7 3.4 - 5.3 mmol/L    Chloride 114 (H) 98 - 107 mmol/L    Carbon Dioxide (CO2) 25 22 - 29 mmol/L    Anion Gap 7 7 - 15 mmol/L    Urea Nitrogen 22.8 8.0 - 23.0 mg/dL    Creatinine 0.86 0.51 - 0.95 mg/dL    GFR Estimate 72 >60 mL/min/1.73m2    Calcium 7.7 (L) 8.8 - 10.4 mg/dL    Glucose 115 (H) 70 - 99 mg/dL   CBC with platelets    Collection Time: 12/05/24  6:28 AM   Result Value Ref Range    WBC Count 6.0 4.0 - 11.0 10e3/uL    RBC Count 2.48 (L) 3.80 - 5.20 10e6/uL    Hemoglobin 7.6 (L) 11.7 - 15.7 g/dL    Hematocrit 24.6 (L) 35.0 - 47.0 %    MCV 99 78 - 100 fL    MCH 30.6 26.5 - 33.0 pg    MCHC 30.9 (L) 31.5 - 36.5 g/dL    RDW 18.1 (H) 10.0 - 15.0 %    Platelet Count 113 (L) 150 - 450 10e3/uL   Phosphorus    Collection Time: 12/05/24  2:58 PM   Result Value Ref Range    Phosphorus 2.7 2.5 - 4.5 mg/dL   Basic metabolic panel    Collection Time: 12/05/24  2:58 PM   Result Value Ref Range    Sodium 145 135 - 145 mmol/L    Potassium 4.7 3.4 - 5.3 mmol/L    Chloride 114  (H) 98 - 107 mmol/L    Carbon Dioxide (CO2) 26 22 - 29 mmol/L    Anion Gap 5 (L) 7 - 15 mmol/L    Urea Nitrogen 24.1 (H) 8.0 - 23.0 mg/dL    Creatinine 0.82 0.51 - 0.95 mg/dL    GFR Estimate 76 >60 mL/min/1.73m2    Calcium 7.6 (L) 8.8 - 10.4 mg/dL    Glucose 102 (H) 70 - 99 mg/dL     Assessment/Plan: Alis Hartman is a 69 year old female with serous endometrial adenocarcinoma of uterus, currently s/p 3 cycles carboplatin/paclitaxel who was admitted with mental status changes attributed to UTI/urosepsis. She was weaned off of pressors in the evening of 12/1, and has been able to maintain an appropriate blood pressure since. Today's goals include encouraging PO intake.    # Urosepsis  -Will complete 10 day course of ertapenem tomorrow, given previous ESBL infections per ID.      # Oliguria, resolved  # MADHU, resolved  # CKD stage 3a  -Cr back at baseline and UOP improved.   -Continue to avoid nephrotoxic medications    # L subclavian/axillary non occlusive DVT around PICC  -Transitioned to PO anticoagulation yesterday, D#2/7 apixaban 10mg BID which will be transitioned to Apixaban 5mg BID.    # Serous endometrial adenocarcinoma of uterus  - No further systemic therapy planned per pt's primary oncologist given multiple admissions and poor functional status    #Severe Malnutrition  -Electrolytes within normal limits today  -Replace electrolytes PRN  -Encourage PO intake  -Radiology is hesitant to exchange NG for NJ given history of gastric bypass but this may complicate TCU placement so will continue to readdress with radiology as needed.     # Atrial fibrillation on eliquis (held)  # Concern for chronotropic incompetence  -Holding PTA atenolol and BP has been well controlled. Will re-consult after 10 days of ertapenem.  -Per Cardiology:  - Once patient is out of ICU and has been clear of infectious process by infectious disease team, could consider placement of PPM implant  - Continue to hold patient's PTA  "atenolol    #MDD/Schizophrenia  #Waxing and Waning Mental Status  #C/f Delirium   -Psych consult: hold ambien, seroquel, gabapentin, and zoloft.   -Psych to follow for medication recommendation.    # Chronic stable conditions  - Anemia of chronic disease: Stable, transfuse to Hb >7  - Asthma: PTA albuterol   - Hx/o gastric bypass - avoid NSAIDs  - Stage 2 Sacral/Coccygeal Pressure Ulcer- WOC consulted; appreciate cares.      Code status: After discussion with patient and daughter when patient had capacity, remains full code.     Clinically Significant Risk Factors     # Obesity: Estimated body mass index is 31.75 kg/m  as calculated from the following:    Height as of this encounter: 1.6 m (5' 3\").    Weight as of this encounter: 81.3 kg (179 lb 3.7 oz).    # Severe Malnutrition: based on nutrition assessment      Dispo: TCU recommended. Pt medically ready for discharge when safe discharge can be arranged    Patient discussed with Dr. Jon Mathis MD  Obstetrics and Gynecology, PGY-2  12/05/2024 6:25 AM     ICorby MD personally examined and evaluated this patient on 12/05/24.  I discussed the patient with the resident and care team, and agree with the assessment and plan of care as documented in the residents note above.    I personally reviewed vital signs, laboratory values and imaging results.        Lilliam Webb MD  Gynecologic Oncology  [facility]    "

## 2024-12-05 NOTE — PLAN OF CARE
"[id number] Goal Outcome Evaluation:         Overall Patient Progress: no change       VS:  /84 (BP Location: Right arm)   Pulse 92   Temp 97.9  F (36.6  C) (Oral)   Resp 16   Ht 1.6 m (5' 3\")   Wt 81.3 kg (179 lb 3.7 oz)   SpO2 100%   BMI 31.75 kg/m      Cardiac:  HR 85-94, -127, denied CP   Respiratory:  Sating >95% on RA, denied SOB @ rest, LSCTA & diminished @ bilat bases   GI/:  No BM this shift; suprapubic cath & L PNT intact   Neuro:  A&O x4 w/ int confusion, denied new numbness or tingling   Pain:  Abd & lower back pain managed w/ scheduled Tylenol & PRN oxy 5mg Q6H   Activity:  Lift assist, repo Q2H, up to chair w/ OT & PT this shift   Skin:  Pls see flowsheet for details   Dressing:  R PIV dressing CDI;   R port dressing CDI;   L PNT dressing changed this shift, CDI;   Suprapubic cath dressing CDI   Diet:  Soft & bite size w/ thin; pt endorsed nausea this AM but refused PRN when offered, nausea eventually resolved;   Cycle TF 2236-0615   LDA:  R PIV SL;   R SL port SL;   L PNT to gravity;   Suprapubic cathether;   NG clamped   Equipment:  IV pole, IV pump, & personal belongings   Plan:  Cont POC   Additional Info:         "

## 2024-12-05 NOTE — CONSULTS
Care Management Follow Up    Length of Stay (days): 8    Expected Discharge Date:       Concerns to be Addressed: discharge planning     Patient plan of care discussed at interdisciplinary rounds: Yes    Anticipated Discharge Disposition:  Transitional Care Unit  Anticipated Discharge Services:  TBD  Anticipated Discharge DME:  TBD    Patient/family educated on Medicare website which has current facility and service quality ratings:  yes  Education Provided on the Discharge Plan: yes   Patient/Family in Agreement with the Plan: yes     Referrals Placed by CM/SW:    Private pay costs discussed: Not applicable    Discussed  Partnership in Safe Discharge Planning  document with patient/family: No     Handoff Completed: No, handoff not indicated or clinically appropriate    Additional Information:  SW received an update from primary gyn/onc provider that pt will keep NG tube in, due to pt being end stage cancer and will be med ready tomorrow. SW called pt's dtr and introduced self and role. SW informed dtr of plan and asked if she made her TCU preferences. Dtr reported she is agreeable to plan and will email SW the preferences. SW provided pt's dtr with SW's email address.    Next Steps: SW awaiting pt's dtr to send TCU preferences, so SW can send out TCU referrals. SW to follow for updates and discharge planning.    WILLIAM Martinez, LGSW  6B   Ph: [phone]  Vocera: [employer] or 6B [facility] BANDAR

## 2024-12-05 NOTE — PLAN OF CARE
Neuro: A&Ox4.    Cardiac:. VSS. Not on tele   Respiratory: Sating >95% on RA.  GI/: Adequate urine output via suprapubic catheter. Loose BM X2  Diet/appetite: on soft and bite sized diet with thin liquids, poor appetite.  TF running at 50ml/hr with 30ml FWF q4  Activity:  Assist of 2, for repo and turns  Pain: pt pain score on her bottocks is 6-8/10 PRN oxycodone and tylenol given for management  Skin: No new deficits noted.  LDA's: R chest portacath SL, R PIV   Plan: Continue with POC. Notify primary team with changes.       Goal Outcome Evaluation:                 Outcome Evaluation: pt pain score on her bottocks is 6-8/10 PRN oxycodone and tylenol given for management

## 2024-12-05 NOTE — PLAN OF CARE
"[id number] Goal Outcome Evaluation:         Overall Patient Progress: declining       VS:  /73 (BP Location: Right arm)   Pulse 92   Temp 98  F (36.7  C) (Oral)   Resp 18   Ht 1.6 m (5' 3\")   Wt 82.3 kg (181 lb 7 oz)   SpO2 94%   BMI 32.14 kg/m      Cardiac:  HR 87-92, BP /67-80, denied CP   Respiratory:  Sating >93% on RA, denied SOB @ rest, LSCTA & diminished @ bilat bases   GI/:  1 watery, loose, brown stool this shift, bedpan was used; suprapubic catheter w/ watermelon output & L PNT w/ bright red output this shift   Neuro:  A&O x4, forgetful, denied new numbness or tingling   Pain:  Abd pain & sacral pain managed w/ scheduled Tylenol & PRN oxy given x2 w/ relief per pt   Activity:  Lift assist, repo Q2H   Skin:  Pls see flowsheet for details   Dressing:  R PIV dressing CDI;   R port dressing CDI;   L nephrostomy dressing changed this shift, CDI;   Suprapubic catheter dressing changed this shift, CDI   Diet:  Soft & bite site w/ thin; denied N/V;   Poor appetite, 0% intake this shift   Cont TF @ 50 mL/hr w/ 30 mL flush Q4H;   Aj count 12/5-12/7   LDA:  R PIV SL;   R SL port SL;   L PNT to gravity;   Suprapubic catheter to gravity;   NG R nostril infusing w/ cont TF   Equipment:  IV pole, IV pump, Kangaroo pump, & personal belongings   Plan:  Cont POC   Additional Info:  P replaced this shift, recheck in AM       "

## 2024-12-05 NOTE — PROGRESS NOTES
CLINICAL NUTRITION SERVICES - BRIEF NOTE     Nutrition Prescription    RECOMMENDATIONS FOR MDs/PROVIDERS TO ORDER:  Minimal intake goal ~1100 Kcals before considering removal of FT (65% of avg est needs)   If fails to meet nutrition goals PO, consider discussion of long-term enteral access if aligns with patient GOC (currently full code).      Recommendations already ordered by Registered Dietitian (RD):  Aj cts 12/5-12/7    Modified ONS order per pt flavor preference 12/4    Room service with assistance to begin today (encourage at least 1 high protein food per meal tray; maximize Kcals as able)    Encouraged PO intake during visit with patient. Encouraged small frequent meals/snacks/supplements, eat Q3hrs even if small amounts. Focus on high protein foods first on meal trays.     Begin cycling TFs to stimulate day time appetite.   EN access: NGT (large bore)  Cyclic Timing: x12 hours, 8PM - 8AM   Modulars: [id number] 1 pkt daily    Regimen: Osmolite 1.5 aj (or equivalent) at rate of 50 mL/hr x 12 hours to provide: 600 mL formula, 980 Kcals, 58 gm protein, 122 gm CHO, 0 gm fiber, and 457 mL free water daily   --> This regimen provides 68% low-end Kcal goal, and 67% low-end protein goal  '  Future/Additional Recommendations:  Continue to monitor nutrition related findings per protocol    For last full RD assessment, see note dated 12/4/24    NEW FINDINGS   - Team discussing nutrition POC, hoping pt will be able to go to TCU with NG but this is unlikely per SW report (and per writer's experience, long-term enteral access such as PEG preferred prior to TCU although individual cases vary). Discussed POC with primary team to cycle patient's TFs to stimulate day time appetite.     Patient so far only eating bites of food. Ate 25% of breakfast however this consisted of fruit, so minimal Kcal/protein contribution. Supplements in room but unopened; RN encouraging. Calorie counts and room service assist to begin today.      Writer encouraged PO intake with patient at bedside and updated on POC for cyclic feeds to stimulate appetite. Pt with no further questions but minimally engaged with writer during visit.     Monitoring/Evaluation  Will continue to monitor and evaluate per protocol.    Ye Philip, MS, RDN, LD, CNSC  Available by [employer] or phone   Vocera: M-F (7:00-3:30)  6B Clinical Dietitian  or 1A Obs Clinical Dietitian  Weekend/Holiday (7:00-3:30) - Weekend Clinical Dietitian   6B RD desk: [phone]       **Clinical Dietitians are no longer available by pager.

## 2024-12-06 ENCOUNTER — APPOINTMENT (OUTPATIENT)
Dept: SPEECH THERAPY | Facility: CLINIC | Age: 69
DRG: 698 | End: 2024-12-06
Payer: COMMERCIAL

## 2024-12-06 ENCOUNTER — APPOINTMENT (OUTPATIENT)
Dept: OCCUPATIONAL THERAPY | Facility: CLINIC | Age: 69
DRG: 698 | End: 2024-12-06
Payer: COMMERCIAL

## 2024-12-06 ENCOUNTER — APPOINTMENT (OUTPATIENT)
Dept: PHYSICAL THERAPY | Facility: CLINIC | Age: 69
DRG: 698 | End: 2024-12-06
Payer: COMMERCIAL

## 2024-12-06 LAB
ANION GAP SERPL CALCULATED.3IONS-SCNC: 4 MMOL/L (ref 7–15)
BUN SERPL-MCNC: 22.1 MG/DL (ref 8–23)
CALCIUM SERPL-MCNC: 7.6 MG/DL (ref 8.8–10.4)
CHLORIDE SERPL-SCNC: 115 MMOL/L (ref 98–107)
CREAT SERPL-MCNC: 0.78 MG/DL (ref 0.51–0.95)
EGFRCR SERPLBLD CKD-EPI 2021: 81 ML/MIN/1.73M2
ERYTHROCYTE [DISTWIDTH] IN BLOOD BY AUTOMATED COUNT: 18 % (ref 10–15)
GLUCOSE BLDC GLUCOMTR-MCNC: 69 MG/DL (ref 70–99)
GLUCOSE BLDC GLUCOMTR-MCNC: 85 MG/DL (ref 70–99)
GLUCOSE BLDC GLUCOMTR-MCNC: 88 MG/DL (ref 70–99)
GLUCOSE BLDC GLUCOMTR-MCNC: 95 MG/DL (ref 70–99)
GLUCOSE BLDC GLUCOMTR-MCNC: 95 MG/DL (ref 70–99)
GLUCOSE SERPL-MCNC: 110 MG/DL (ref 70–99)
HCO3 SERPL-SCNC: 26 MMOL/L (ref 22–29)
HCT VFR BLD AUTO: 23.2 % (ref 35–47)
HGB BLD-MCNC: 7.3 G/DL (ref 11.7–15.7)
MAGNESIUM SERPL-MCNC: 2.4 MG/DL (ref 1.7–2.3)
MCH RBC QN AUTO: 31.2 PG (ref 26.5–33)
MCHC RBC AUTO-ENTMCNC: 31.5 G/DL (ref 31.5–36.5)
MCV RBC AUTO: 99 FL (ref 78–100)
PHOSPHATE SERPL-MCNC: 2.8 MG/DL (ref 2.5–4.5)
PLAT MORPH BLD: ABNORMAL
PLATELET # BLD AUTO: 146 10E3/UL (ref 150–450)
POLYCHROMASIA BLD QL SMEAR: SLIGHT
POTASSIUM SERPL-SCNC: 5 MMOL/L (ref 3.4–5.3)
RBC # BLD AUTO: 2.34 10E6/UL (ref 3.8–5.2)
RBC MORPH BLD: ABNORMAL
SODIUM SERPL-SCNC: 145 MMOL/L (ref 135–145)
TARGETS BLD QL SMEAR: SLIGHT
WBC # BLD AUTO: 6.8 10E3/UL (ref 4–11)

## 2024-12-06 PROCEDURE — 250N000013 HC RX MED GY IP 250 OP 250 PS 637

## 2024-12-06 PROCEDURE — 99232 SBSQ HOSP IP/OBS MODERATE 35: CPT | Mod: GC | Performed by: OBSTETRICS & GYNECOLOGY

## 2024-12-06 PROCEDURE — 92526 ORAL FUNCTION THERAPY: CPT | Mod: GN

## 2024-12-06 PROCEDURE — 85014 HEMATOCRIT: CPT

## 2024-12-06 PROCEDURE — 80048 BASIC METABOLIC PNL TOTAL CA: CPT

## 2024-12-06 PROCEDURE — 97530 THERAPEUTIC ACTIVITIES: CPT | Mod: GO

## 2024-12-06 PROCEDURE — 84100 ASSAY OF PHOSPHORUS: CPT

## 2024-12-06 PROCEDURE — 120N000003 HC R&B IMCU UMMC

## 2024-12-06 PROCEDURE — 82565 ASSAY OF CREATININE: CPT

## 2024-12-06 PROCEDURE — 250N000013 HC RX MED GY IP 250 OP 250 PS 637: Performed by: NURSE PRACTITIONER

## 2024-12-06 PROCEDURE — 97140 MANUAL THERAPY 1/> REGIONS: CPT | Mod: GO

## 2024-12-06 PROCEDURE — 97530 THERAPEUTIC ACTIVITIES: CPT | Mod: GP

## 2024-12-06 PROCEDURE — 83735 ASSAY OF MAGNESIUM: CPT

## 2024-12-06 PROCEDURE — 84295 ASSAY OF SERUM SODIUM: CPT

## 2024-12-06 PROCEDURE — 250N000013 HC RX MED GY IP 250 OP 250 PS 637: Performed by: INTERNAL MEDICINE

## 2024-12-06 PROCEDURE — 250N000011 HC RX IP 250 OP 636

## 2024-12-06 RX ADMIN — Medication 60 ML: at 08:48

## 2024-12-06 RX ADMIN — Medication 1 TABLET: at 08:47

## 2024-12-06 RX ADMIN — CALCIUM CARBONATE 600 MG (1,500 MG)-VITAMIN D3 400 UNIT TABLET 1 TABLET: at 08:47

## 2024-12-06 RX ADMIN — Medication 3 MG: at 21:17

## 2024-12-06 RX ADMIN — ACETAMINOPHEN 650 MG: 325 TABLET, FILM COATED ORAL at 15:13

## 2024-12-06 RX ADMIN — CALCIUM CARBONATE 600 MG (1,500 MG)-VITAMIN D3 400 UNIT TABLET 1 TABLET: at 18:00

## 2024-12-06 RX ADMIN — OXYCODONE HYDROCHLORIDE 5 MG: 5 TABLET ORAL at 15:13

## 2024-12-06 RX ADMIN — ERTAPENEM SODIUM 1 G: 1 INJECTION, POWDER, LYOPHILIZED, FOR SOLUTION INTRAMUSCULAR; INTRAVENOUS at 05:38

## 2024-12-06 RX ADMIN — APIXABAN 10 MG: 5 TABLET, FILM COATED ORAL at 20:17

## 2024-12-06 RX ADMIN — CYANOCOBALAMIN TAB 500 MCG 500 MCG: 500 TAB at 08:47

## 2024-12-06 RX ADMIN — APIXABAN 10 MG: 5 TABLET, FILM COATED ORAL at 08:47

## 2024-12-06 RX ADMIN — ACETAMINOPHEN 650 MG: 325 TABLET, FILM COATED ORAL at 08:47

## 2024-12-06 RX ADMIN — THIAMINE HCL TAB 100 MG 100 MG: 100 TAB at 08:47

## 2024-12-06 RX ADMIN — OXYCODONE HYDROCHLORIDE 5 MG: 5 TABLET ORAL at 09:14

## 2024-12-06 RX ADMIN — OXYCODONE HYDROCHLORIDE 5 MG: 5 TABLET ORAL at 21:17

## 2024-12-06 ASSESSMENT — ACTIVITIES OF DAILY LIVING (ADL)
ADLS_ACUITY_SCORE: 80
ADLS_ACUITY_SCORE: 80
ADLS_ACUITY_SCORE: 76
ADLS_ACUITY_SCORE: 76
ADLS_ACUITY_SCORE: 80
ADLS_ACUITY_SCORE: 80
ADLS_ACUITY_SCORE: 76
ADLS_ACUITY_SCORE: 80
ADLS_ACUITY_SCORE: 76
ADLS_ACUITY_SCORE: 80
ADLS_ACUITY_SCORE: 76
ADLS_ACUITY_SCORE: 80
ADLS_ACUITY_SCORE: 76

## 2024-12-06 NOTE — PROGRESS NOTES
Calorie Count  Intake recorded for: 12/5  Total Kcals: 196 Total Protein: 5g  Kcals from Hospital Food: 196   Protein: 5g  Kcals from Outside Food (average): 0  Protein: 0g  # Meals Ordered from Kitchen: 3 meals  # Meals Recorded: 3 meals (First - 25% peaches, applesauce)  (Second - 10% 8 oz 1% milk, 1% penne with meat sauce, peaches, pears)  (Third - 25% orange jello, 5% 6 oz tomato soup, hot cocoa)  # Supplements Recorded: 10% 1 Ensure Enlive Chocolate, 1 Magic Cup Butter Pecan

## 2024-12-06 NOTE — PLAN OF CARE
"[id number]      Neuro: A&Ox3. Intermittent confusion. Intermittent agitation  Cardiac: VSS.  Respiratory: Sating >97% on RA. Provider Ok'd doing spot checks d/t sleep being inturrupted from spo2 probe reading troubles which may be exasperating her AMS  GI/: Adequate urine output via suprapubic catheter and L nephrostomy  Diet/appetite: Cyclic TF 8p-8a @ 50mL/hr via NG and soft, bite-sized diet w/ thin liquids. Aspiration risk  Activity: Up w/ lift. Q2 repo  Pain: Rated pain 8/10. PRN oxy x1. During pain reassessment, she stated that her pain was still an 8/10 but that \"the pain went down after the oxycodone\" Refused scheduled Tylenol, education given, continued refusal. Rated pain 9/10 and refused scheduled Tylenol and PRN oxy. Education given, continued refusal stating that \"she didn't want them right now\".  Skin: Sacral wound, mepilex changed.  LDA's: NG, R PIV, R Port, L nephrostomy   Endo: Q4 BS  +: K+, phos, mag. Mag and K+ replaced during shift.    Shift updates: Low air loss pump obtained, new orders for spot checking O2 obtained, daughter visited at bedside and spoke with provider  Plan: Find placement for and discharge to TCU. Continue with POC. Notify primary team with changes.         Goal Outcome Evaluation:      Plan of Care Reviewed With: patient    Overall Patient Progress: no changeOverall Patient Progress: no change    Outcome Evaluation: Sacral pain 8/10, PRN oxy given. Q2 repo, TF @ 50mL/hr.      "

## 2024-12-06 NOTE — PROGRESS NOTES
Care Management Follow Up    Length of Stay (days): 9    Expected Discharge Date: 12/09/2024     Concerns to be Addressed: all concerns addressed in this encounter     Patient plan of care discussed at interdisciplinary rounds: Yes    Anticipated Discharge Disposition:  Transitional Care Unit  Anticipated Discharge Services:  TBD  Anticipated Discharge DME:  TBD    Patient/family educated on Medicare website which has current facility and service quality ratings:  yes  Education Provided on the Discharge Plan:  yes  Patient/Family in Agreement with the Plan: yes     Referrals Placed by CM/SW:      Pending:    [facility]  [address] [place]  [address]  P: [phone]  P: [phone] - Admissions  F: [phone]   12/6: BANDAR sent initial EPIC referral.    [facility]  [address] [place] [address]  P: [phone]  F: [phone]  Joselito Alcaraz liaison: [phone]   12/6: BANDAR sent initial EPIC referral.    [facility]  [address] [place]  [address]  P: [phone]  F: [phone]   12/6: SW sent initial EPIC referral.    [facility]  [address] [place] [address]  Ph: [phone]  F: [phone]   12/6: SW sent initial EPIC referral.    [facility]  [address] [place] [address]  Ph: [phone]  Lissa: [phone]  F: [phone]   12/6: BANDAR sent initial EPIC referral.    [facility] [place]  [address]  [place] [address]  Ph: [phone]  Lissa Meredith [phone]  F: [phone]   12/6: BANDAR sent initial EPIC referral.    Private pay costs discussed: Not applicable    Discussed  Partnership in Safe Discharge Planning  document with patient/family: No     Handoff Completed: No, handoff not indicated or clinically appropriate    Additional Information:  Pt is medically ready according to primary gyn/onc providers. SW received an email form dtr  stating she is agreeable to any TCU in the [place] area. SW sent referrals.    Next Steps: Wknd SW to follow up on referrals.    WILLIAM Martinez, LGSW  6B   Ph: [phone]  Vocera: [employer] or 6B [employer] SW

## 2024-12-06 NOTE — PROGRESS NOTES
"Brief progress note    Presented to bedside for PM rounding. Patient's daughter Joy at bedside as well. The patient states that she is doing well this evening. No abdominal pain; however her sacral ulcer is sore. Joy was here because the patient called her and expressed that she felt like she was, \"in a facility.\" Her daughter was concerned that that patient was confused again. The patient was trying to convey that she felt like too many of her cares were occurring without being explained, and that she didn't feel like an active participant in her own care. She became nervous, especially about which medications she was given due to her previously negative experience with taking Seroquel and subsequently being sedated. The patient was empowered to ask her care providers what medications they are administering, which she greatly appreciated.   The patient also stated that she is eating, however does not want to eat too much due to nausea. The patient was advised that if nausea is limiting her oral intake, she can ask for Zofran which is available PRN.   Vital signs WNL, pleasant affect, A/O x 3, and able to participate in a full conversation. Abdomen soft, NTND. Bloody output from her nephrostomy, yellow urine from her suprapubic catheter. Tube feeds running at 50 mL/hr    Ashwin Mathis MD  Gynecologic-Oncology, PGY-2   12/05/2024 10:52 PM    Gyn-Onc pager: [phone]   "

## 2024-12-06 NOTE — PROGRESS NOTES
Gynecology Oncology Progress Note  12/06/2024    Ms. 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 Awake and oriented to person, patient believes she's in her house and that it's 2025. Currently overall comfortable, reports abdominal pain is at baseline, and sacral ulcer is not bothersome right now. No headache, blurry vision, chest pain, SOB, difficulty breathing. Tube feeds are cycling at 50 ml/hr overnight, and will be turned off during the day. The patient is motivated to take more PO today, and to participate in PT/OT.     Objective:   Patient Vitals for the past 24 hrs:   BP Temp Temp src Pulse Resp SpO2   12/06/24 0739 131/87 97.9  F (36.6  C) Oral 97 19 100 %   12/06/24 0600 98/57 98.2  F (36.8  C) Oral -- -- 100 %   12/06/24 0045 126/89 98.6  F (37  C) Oral -- 16 98 %   12/1955 127/84 97.9  F (36.6  C) Oral 92 16 100 %   12/05/24 1617 124/78 99.7  F (37.6  C) Oral 81 16 100 %   12/05/24 1208 110/71 98.2  F (36.8  C) Oral -- 15 97 %      General: cachectic-appearing female, in NAD, NG tube in place, under multiple layers of blankets  CV: Regular rate  Resp: Non-labored breathing on room air  Abdomen: Soft, moderately tender in the supra-pubic area around catheter, non-distended  Extremities: 2+ bilateral lower extremity edema, wrapped with ace-bandages.  Lines/Drains: Supra-pubic catheter site non-erythematous and suprapubic catheter is draining yellow urine. L PNT with bloody appearing urine     Drains: suprapubic catheter, port, pIV, PNT (L)    Intake (24h//since MN)  PO: 166 //0ml  IV: 50 mL//20mL  NG/GT: 500 mL//60 mL  Enteral: 550 mL//400 mL    Output:  PNT: 535  mL//80mL  Suprapubic cath: 1550 mL//300 mL  Stool: 3x//2x    UOP: 0.78 ml/kg/hr    Recent Results (from the past 24 hours)   Glucose by meter    Collection Time: 12/05/24 10:52 AM   Result Value Ref Range    GLUCOSE BY METER POCT 103 (H) 70 - 99 mg/dL   Phosphorus    Collection Time: 12/05/24  2:58 PM   Result Value Ref Range    Phosphorus 2.7 2.5 - 4.5 mg/dL   Basic metabolic panel    Collection Time: 12/05/24  2:58 PM   Result Value Ref Range    Sodium 145 135 - 145 mmol/L    Potassium 4.7 3.4 - 5.3 mmol/L    Chloride 114 (H) 98 - 107 mmol/L    Carbon Dioxide (CO2) 26 22 - 29 mmol/L    Anion Gap 5 (L) 7 - 15 mmol/L    Urea Nitrogen 24.1 (H) 8.0 - 23.0 mg/dL    Creatinine 0.82 0.51 - 0.95 mg/dL    GFR Estimate 76 >60 mL/min/1.73m2    Calcium 7.6 (L) 8.8 - 10.4 mg/dL    Glucose 102 (H) 70 - 99 mg/dL   Glucose by meter    Collection Time: 12/05/24  5:21 PM   Result Value Ref Range    GLUCOSE BY METER POCT 72 70 - 99 mg/dL   Glucose by meter    Collection Time: 12/05/24 10:51 PM   Result Value Ref Range    GLUCOSE BY METER POCT 103 (H) 70 - 99 mg/dL   Magnesium    Collection Time: 12/06/24  5:58 AM   Result Value Ref Range    Magnesium 2.4 (H) 1.7 - 2.3 mg/dL   Phosphorus    Collection Time: 12/06/24  5:58 AM   Result Value Ref Range    Phosphorus 2.8 2.5 - 4.5 mg/dL   Basic metabolic panel    Collection Time: 12/06/24  5:58 AM   Result Value Ref Range    Sodium 145 135 - 145 mmol/L    Potassium 5.0 3.4 - 5.3 mmol/L    Chloride 115 (H) 98 - 107 mmol/L    Carbon Dioxide (CO2) 26 22 - 29 mmol/L    Anion Gap 4 (L) 7 - 15 mmol/L    Urea Nitrogen 22.1 8.0 - 23.0 mg/dL    Creatinine 0.78 0.51 - 0.95 mg/dL    GFR Estimate 81 >60 mL/min/1.73m2    Calcium 7.6 (L) 8.8 - 10.4 mg/dL    Glucose 110 (H) 70 - 99 mg/dL   CBC with platelets    Collection Time: 12/06/24  5:58 AM   Result Value Ref Range    WBC Count 6.8 4.0 - 11.0 10e3/uL    RBC Count 2.34 (L) 3.80 - 5.20 10e6/uL    Hemoglobin 7.3 (L) 11.7 - 15.7 g/dL    Hematocrit  23.2 (L) 35.0 - 47.0 %    MCV 99 78 - 100 fL    MCH 31.2 26.5 - 33.0 pg    MCHC 31.5 31.5 - 36.5 g/dL    RDW 18.0 (H) 10.0 - 15.0 %    Platelet Count 146 (L) 150 - 450 10e3/uL   RBC and Platelet Morphology    Collection Time: 12/06/24  5:58 AM   Result Value Ref Range    RBC Morphology Confirmed RBC Indices     Platelet Assessment  Automated Count Confirmed. Platelet morphology is normal.     Automated Count Confirmed. Platelet morphology is normal.    Polychromasia Slight (A) None Seen    Target Cells Slight (A) None Seen   Glucose by meter    Collection Time: 12/06/24  8:43 AM   Result Value Ref Range    GLUCOSE BY METER POCT 85 70 - 99 mg/dL     Assessment/Plan: Alis Hartman is a 69 year old female with serous endometrial adenocarcinoma of uterus, currently s/p 3 cycles carboplatin/paclitaxel who is HD#10, admitted with mental status changes attributed to UTI/urosepsis. She was weaned off of pressors in the evening of 12/1, and has been able to maintain an appropriate blood pressure since. Today's goals include encouraging PO intake.    # Urosepsis, resolved  -Last dose of 10 day course of ertapenem today      # CKD stage 3a  -Continue to avoid nephrotoxic medications    # L subclavian/axillary non occlusive DVT around PICC  -Transitioned to PO anticoagulation, D#4/7 apixaban 10mg BID which will be transitioned to Apixaban 5mg BID after 7 days    # Serous endometrial adenocarcinoma of uterus  - No further systemic therapy planned per pt's primary oncologist given multiple admissions and poor functional status    #Severe Malnutrition  -Replace electrolytes PRN  -Encourage PO intake, calorie counts in process  -Radiology is hesitant to exchange NG for NJ given history of gastric bypass but this may complicate TCU placement so will continue to readdress with radiology as needed.     # Atrial fibrillation on eliquis (held)  # Concern for chronotropic incompetence, resolved  -EP saw patient yesterday and do  "not recommend pacemaker or restarting atenolol. Pt has outpatient follow up arranged for February    #MDD/Schizophrenia  #Waxing and Waning Mental Status  #C/f Delirium   -Psych consult: hold ambien, seroquel, gabapentin, and zoloft.   -No further recommendations from psych    # Chronic stable conditions  - Anemia of chronic disease: Stable, transfuse to Hb >7  - Asthma: PTA albuterol   - Hx/o gastric bypass - avoid NSAIDs  - Stage 2 Sacral/Coccygeal Pressure Ulcer- WOC consulted; appreciate cares.      Code status: After discussion with patient and daughter, patient, remains full code.     Clinically Significant Risk Factors     # Obesity: Estimated body mass index is 31.75 kg/m  as calculated from the following:    Height as of this encounter: 1.6 m (5' 3\").    Weight as of this encounter: 81.3 kg (179 lb 3.7 oz).    # Severe Malnutrition: based on nutrition assessment      Dispo: TCU recommended. Pt medically ready for discharge when safe discharge can be arranged    Patient discussed with Dr. Jon Mathis MD  Obstetrics and Gynecology, PGY-2  12/06/2024 6:08 AM     ICorby MD personally examined and evaluated this patient on 12/06/24.  I discussed the patient with the resident and care team, and agree with the assessment and plan of care as documented in the residents note above.    I personally reviewed vital signs, laboratory values and imaging results.        Lilliam Webb MD  Gynecologic Oncology  [facility]    "

## 2024-12-07 LAB
ABO + RH BLD: NORMAL
ANION GAP SERPL CALCULATED.3IONS-SCNC: 3 MMOL/L (ref 7–15)
BLD GP AB SCN SERPL QL: NEGATIVE
BLD PROD TYP BPU: NORMAL
BLOOD COMPONENT TYPE: NORMAL
BUN SERPL-MCNC: 22 MG/DL (ref 8–23)
CALCIUM SERPL-MCNC: 7.6 MG/DL (ref 8.8–10.4)
CHLORIDE SERPL-SCNC: 115 MMOL/L (ref 98–107)
CODING SYSTEM: NORMAL
CREAT SERPL-MCNC: 0.68 MG/DL (ref 0.51–0.95)
CROSSMATCH: NORMAL
EGFRCR SERPLBLD CKD-EPI 2021: >90 ML/MIN/1.73M2
ERYTHROCYTE [DISTWIDTH] IN BLOOD BY AUTOMATED COUNT: 17.6 % (ref 10–15)
ERYTHROCYTE [DISTWIDTH] IN BLOOD BY AUTOMATED COUNT: 17.8 % (ref 10–15)
GLUCOSE BLDC GLUCOMTR-MCNC: 101 MG/DL (ref 70–99)
GLUCOSE BLDC GLUCOMTR-MCNC: 105 MG/DL (ref 70–99)
GLUCOSE BLDC GLUCOMTR-MCNC: 108 MG/DL (ref 70–99)
GLUCOSE BLDC GLUCOMTR-MCNC: 27 MG/DL (ref 70–99)
GLUCOSE BLDC GLUCOMTR-MCNC: 39 MG/DL (ref 70–99)
GLUCOSE BLDC GLUCOMTR-MCNC: 77 MG/DL (ref 70–99)
GLUCOSE BLDC GLUCOMTR-MCNC: 85 MG/DL (ref 70–99)
GLUCOSE BLDC GLUCOMTR-MCNC: 97 MG/DL (ref 70–99)
GLUCOSE SERPL-MCNC: 113 MG/DL (ref 70–99)
HCO3 SERPL-SCNC: 25 MMOL/L (ref 22–29)
HCT VFR BLD AUTO: 20.8 % (ref 35–47)
HCT VFR BLD AUTO: 20.9 % (ref 35–47)
HGB BLD-MCNC: 6.4 G/DL (ref 11.7–15.7)
HGB BLD-MCNC: 6.5 G/DL (ref 11.7–15.7)
HGB BLD-MCNC: 8.3 G/DL (ref 11.7–15.7)
ISSUE DATE AND TIME: NORMAL
MAGNESIUM SERPL-MCNC: 2 MG/DL (ref 1.7–2.3)
MCH RBC QN AUTO: 30.8 PG (ref 26.5–33)
MCH RBC QN AUTO: 31.1 PG (ref 26.5–33)
MCHC RBC AUTO-ENTMCNC: 30.6 G/DL (ref 31.5–36.5)
MCHC RBC AUTO-ENTMCNC: 31.3 G/DL (ref 31.5–36.5)
MCV RBC AUTO: 100 FL (ref 78–100)
MCV RBC AUTO: 101 FL (ref 78–100)
PHOSPHATE SERPL-MCNC: 2.6 MG/DL (ref 2.5–4.5)
PLATELET # BLD AUTO: 151 10E3/UL (ref 150–450)
PLATELET # BLD AUTO: 163 10E3/UL (ref 150–450)
POTASSIUM SERPL-SCNC: 4.8 MMOL/L (ref 3.4–5.3)
RBC # BLD AUTO: 2.08 10E6/UL (ref 3.8–5.2)
RBC # BLD AUTO: 2.09 10E6/UL (ref 3.8–5.2)
SODIUM SERPL-SCNC: 143 MMOL/L (ref 135–145)
SPECIMEN EXP DATE BLD: NORMAL
UNIT ABO/RH: NORMAL
UNIT NUMBER: NORMAL
UNIT STATUS: NORMAL
UNIT TYPE ISBT: 5100
WBC # BLD AUTO: 5.9 10E3/UL (ref 4–11)
WBC # BLD AUTO: 6.4 10E3/UL (ref 4–11)

## 2024-12-07 PROCEDURE — 85018 HEMOGLOBIN: CPT

## 2024-12-07 PROCEDURE — 250N000013 HC RX MED GY IP 250 OP 250 PS 637: Performed by: NURSE PRACTITIONER

## 2024-12-07 PROCEDURE — 250N000013 HC RX MED GY IP 250 OP 250 PS 637

## 2024-12-07 PROCEDURE — 86923 COMPATIBILITY TEST ELECTRIC: CPT

## 2024-12-07 PROCEDURE — 99233 SBSQ HOSP IP/OBS HIGH 50: CPT | Mod: GC | Performed by: OBSTETRICS & GYNECOLOGY

## 2024-12-07 PROCEDURE — 99232 SBSQ HOSP IP/OBS MODERATE 35: CPT

## 2024-12-07 PROCEDURE — 83735 ASSAY OF MAGNESIUM: CPT

## 2024-12-07 PROCEDURE — 36591 DRAW BLOOD OFF VENOUS DEVICE: CPT

## 2024-12-07 PROCEDURE — 120N000003 HC R&B IMCU UMMC

## 2024-12-07 PROCEDURE — 85048 AUTOMATED LEUKOCYTE COUNT: CPT

## 2024-12-07 PROCEDURE — 82374 ASSAY BLOOD CARBON DIOXIDE: CPT

## 2024-12-07 PROCEDURE — 84100 ASSAY OF PHOSPHORUS: CPT

## 2024-12-07 PROCEDURE — 250N000013 HC RX MED GY IP 250 OP 250 PS 637: Performed by: OBSTETRICS & GYNECOLOGY

## 2024-12-07 PROCEDURE — 86900 BLOOD TYPING SEROLOGIC ABO: CPT | Performed by: OBSTETRICS & GYNECOLOGY

## 2024-12-07 PROCEDURE — 85014 HEMATOCRIT: CPT

## 2024-12-07 PROCEDURE — 80048 BASIC METABOLIC PNL TOTAL CA: CPT

## 2024-12-07 PROCEDURE — 250N000011 HC RX IP 250 OP 636: Performed by: OBSTETRICS & GYNECOLOGY

## 2024-12-07 PROCEDURE — 250N000013 HC RX MED GY IP 250 OP 250 PS 637: Performed by: INTERNAL MEDICINE

## 2024-12-07 PROCEDURE — P9016 RBC LEUKOCYTES REDUCED: HCPCS

## 2024-12-07 RX ORDER — MAGNESIUM SULFATE HEPTAHYDRATE 40 MG/ML
2 INJECTION, SOLUTION INTRAVENOUS ONCE
Status: COMPLETED | OUTPATIENT
Start: 2024-12-07 | End: 2024-12-07

## 2024-12-07 RX ADMIN — OXYCODONE HYDROCHLORIDE 5 MG: 5 TABLET ORAL at 04:46

## 2024-12-07 RX ADMIN — OXYCODONE HYDROCHLORIDE 5 MG: 5 TABLET ORAL at 16:09

## 2024-12-07 RX ADMIN — OXYCODONE HYDROCHLORIDE 5 MG: 5 TABLET ORAL at 22:19

## 2024-12-07 RX ADMIN — ACETAMINOPHEN 650 MG: 325 TABLET, FILM COATED ORAL at 10:03

## 2024-12-07 RX ADMIN — CALCIUM CARBONATE 600 MG (1,500 MG)-VITAMIN D3 400 UNIT TABLET 1 TABLET: at 17:27

## 2024-12-07 RX ADMIN — ACETAMINOPHEN 650 MG: 325 TABLET, FILM COATED ORAL at 22:18

## 2024-12-07 RX ADMIN — Medication 1 TABLET: at 08:26

## 2024-12-07 RX ADMIN — CALCIUM CARBONATE 600 MG (1,500 MG)-VITAMIN D3 400 UNIT TABLET 1 TABLET: at 08:27

## 2024-12-07 RX ADMIN — APIXABAN 5 MG: 5 TABLET, FILM COATED ORAL at 20:11

## 2024-12-07 RX ADMIN — SODIUM PHOSPHATE, DIBASIC, ANHYDROUS, POTASSIUM PHOSPHATE, MONOBASIC, AND SODIUM PHOSPHATE, MONOBASIC, MONOHYDRATE 250 MG: 852; 155; 130 TABLET, COATED ORAL at 14:30

## 2024-12-07 RX ADMIN — Medication 60 ML: at 08:28

## 2024-12-07 RX ADMIN — ACETAMINOPHEN 650 MG: 325 TABLET, FILM COATED ORAL at 16:09

## 2024-12-07 RX ADMIN — CYANOCOBALAMIN TAB 500 MCG 500 MCG: 500 TAB at 08:27

## 2024-12-07 RX ADMIN — APIXABAN 10 MG: 5 TABLET, FILM COATED ORAL at 08:27

## 2024-12-07 RX ADMIN — SODIUM PHOSPHATE, DIBASIC, ANHYDROUS, POTASSIUM PHOSPHATE, MONOBASIC, AND SODIUM PHOSPHATE, MONOBASIC, MONOHYDRATE 250 MG: 852; 155; 130 TABLET, COATED ORAL at 11:19

## 2024-12-07 RX ADMIN — MAGNESIUM SULFATE HEPTAHYDRATE 2 G: 2 INJECTION, SOLUTION INTRAVENOUS at 14:30

## 2024-12-07 RX ADMIN — OXYCODONE HYDROCHLORIDE 5 MG: 5 TABLET ORAL at 10:03

## 2024-12-07 RX ADMIN — Medication 3 MG: at 22:19

## 2024-12-07 RX ADMIN — THIAMINE HCL TAB 100 MG 100 MG: 100 TAB at 08:27

## 2024-12-07 ASSESSMENT — ACTIVITIES OF DAILY LIVING (ADL)
ADLS_ACUITY_SCORE: 77

## 2024-12-07 NOTE — CONSULTS
Psychiatry Consultation; Follow up              Reason for Consult, requesting source:    Hallucinating, know psych dz, c/f delirium as well  Requesting source: Corby Webb    Labs and imaging reviewed, discussed with nursing.               Interim history:    Patient previously seen by psychiatry consult service on 12/2/24 and 12/5/24. Psychiatry is consulted again today for hallucinations.    I met with Aranza in her room, she is laying in bed. She is cooperative, but drowsy. She is receiving a blood transfusion, but was unaware of this and unable to say why she needed it. She tells me she is tired because she didn't sleep well last night. States she has macarena in the hospital for 2 weeks, which is close to accurate, but is unable to explain why she is admitted or what sort of treatment she is receiving. She is oriented to self. After much prompting, she is able to tell me we are in a hospital in [place] but does not recall the name of the hospital. With cues, she is able to state it is [facility]. She denies any hallucinations currently.        Current Medications:     Current Facility-Administered Medications   Medication Dose Route Frequency Provider Last Rate Last Admin    acetaminophen (TYLENOL) tablet 650 mg  650 mg Oral Q6H Anali Nation MD   650 mg at 12/07/24 1003    apixaban ANTICOAGULANT (ELIQUIS) tablet 5 mg  5 mg Oral BID Dany Lopez MD        [Held by provider] atenolol (TENORMIN) half-tab 12.5 mg  12.5 mg Oral Daily Ashwin Mathis MD        [Held by provider] benztropine (COGENTIN) tablet 1 mg  1 mg Oral or Feeding Tube Daily Antonio Carpenter MD   1 mg at 12/01/24 0756    calcium carbonate-vitamin D (CALTRATE) 600-10 MG-MCG per tablet 1 tablet  1 tablet Oral or Feeding Tube BID w/meals Antonio Carpenter MD   1 tablet at 12/07/24 0827    childrens multivitamin w/iron (FLINTSTONES COMPLETE) chewable tablet 1 tablet  1 tablet Oral or Feeding Tube Daily  Antonio Carpenter MD   1 tablet at 12/07/24 0826    cyanocobalamin (VITAMIN B-12) tablet 500 mcg  500 mcg Oral or Feeding Tube Daily Antonio Carpenter MD   500 mcg at 12/07/24 0827    [Held by provider] gabapentin (NEURONTIN) capsule 200 mg  200 mg Oral TID Antonio Carpenter MD        insulin aspart (NovoLOG) injection (RAPID ACTING)  1-3 Units Subcutaneous TID AC Anali Nation MD   1 Units at 11/27/24 1701    insulin aspart (NovoLOG) injection (RAPID ACTING)  1-3 Units Subcutaneous At Bedtime Anali Nation MD        lidocaine (viscous) (XYLOCAINE) 2 % solution 5 mL  5 mL Topical Once Kelsey Mccracken APRN CNP        magnesium sulfate 2 g in 50 mL sterile water intermittent infusion  2 g Intravenous Once Lilliam Roy MD 50 mL/hr at 12/07/24 1430 2 g at 12/07/24 1430    melatonin tablet 3 mg  3 mg Oral or Feeding Tube At Bedtime Antonio Carpenter MD   3 mg at 12/06/24 2117    polyethylene glycol (MIRALAX) Packet 17 g  17 g Oral or Feeding Tube BID Gloria Wen MD   17 g at 12/01/24 2142    Prosource TF20 ENfit Compatibl EN LIQD (PROSOURCE TF20) packet 60 mL  1 packet Per Feeding Tube Daily Gloria Wen MD   60 mL at 12/07/24 0828    [Held by provider] QUEtiapine (SEROquel) tablet 25 mg  25 mg Oral At Bedtime Amy English CNP        senna-docusate (SENOKOT-S/PERICOLACE) 8.6-50 MG per tablet 1 tablet  1 tablet Oral BID Gloria Wen MD        Or    senna-docusate (SENOKOT-S/PERICOLACE) 8.6-50 MG per tablet 2 tablet  2 tablet Oral BID Gloria Wen MD   2 tablet at 12/01/24 2142    [Held by provider] sertraline (ZOLOFT) tablet 50 mg  50 mg Oral or Feeding Tube Daily Jojo Green APRN CNP        sodium chloride (PF) 0.9% PF flush 3 mL  3 mL Intracatheter Q8H Avery Gregory MD   3 mL at 12/07/24 0447    thiamine (B-1) tablet 100 mg  100 mg Oral Daily Roberto Issa APRN CNP   100 mg at 12/07/24 0827              MSE:   Appearance:  drowsy, falling asleep throughout, under several layers  "of blankets  Attitude:  cooperative  Eye Contact:  poor , falling asleep  Mood:   \"okay\"  Affect:  mood congruent  Speech:  dysarthria  Psychomotor Behavior:  no evidence of tardive dyskinesia, dystonia, or tics  Thought Process:  linear and goal oriented  Associations:  no loose associations  Thought Content:  no evidence of suicidal ideation or homicidal ideation and no evidence of psychotic thought  Insight:  limited  Judgement:  fair  Oriented to:   self, partially to place, time, situation  Attention Span and Concentration:  limited  Recent and Remote Memory:  limited      Vital signs:  Temp: 98.4  F (36.9  C) Temp src: Axillary BP: 129/76 Pulse: 98   Resp: 18 SpO2: 100 % O2 Device: None (Room air) Oxygen Delivery: 3 LPM Height: 160 cm (5' 3\") Weight: 81.3 kg (179 lb 3.7 oz)  Estimated body mass index is 31.75 kg/m  as calculated from the following:    Height as of this encounter: 1.6 m (5' 3\").    Weight as of this encounter: 81.3 kg (179 lb 3.7 oz).              DSM-5 Diagnosis:   Delirium  Past diagnosis of Schizophrenia  RAFAEL   Unspecified neurocognitive Disorder: MOCA 25/30 9/22/22          Assessment:   Psychiatry is reconsulted due to concerns patient is hallucinating. RN reports earlier today she was talking to someone in the room who was not there. On my assessment, she is quite drowsy and having difficulty staying awake. She does not appear to be hallucinating but is somewhat confused- only partially oriented to place and time when given cues, and is not able to describe why she is in the hospital or what sort of treatment she is receiving. Symptoms are consistent with delirium, rather than exacerbation of underlying psychiatric illness.          Summary of Recommendations:   Patient appears to be experiencing delirium, with waxing and waning mental status. No evidence of decompensation of underlying psychiatric illness. Daughter has previously expressed desire to avoid psychiatric medications, and she " "has reportedly not taken any psych medications since August without decompensation. At this time, no indication to resume psychiatric medications.  Delirium precautions:  Up during the day with lights on, blinds open  Lights off at night, avoid interruptions during the night as much as possible  Family visits, mementos from home (family photos, favorite blanket, ect) can be helpful  Encourage use of sensory aids (hearing aid, eyeglasses)  Frequent reorientation  Avoid opioids, benzodiazepines, anticholinergics if possible    Continue to ensure proper nutrition, fluid and electrolyte balance. Monitor for infections, hypoxia, metabolic derangements, or other causes of delirium.     ERINN Almanza [facility]  Consult/Liaison Psychiatry   [facility]    Contact information available via [facility] Paging/Directory  If I am not available, then [facility] line ([phone]) should know who is covering our consult service.   \"Much or all of the text in this note was generated through the use of Dragon Dictate voice to text software. Errors in spelling or words which appear to be out of contact are unintentional, may be present due having escaped editing\"           "

## 2024-12-07 NOTE — PLAN OF CARE
Problem: Adult Inpatient Plan of Care  Goal: Optimal Comfort and Wellbeing  Intervention: Monitor Pain and Promote Comfort  Recent Flowsheet Documentation  Taken 12/6/2024 1555 by Sona Soares RN  Pain Management Interventions: repositioned  Taken 12/6/2024 0956 by Sona Soares RN  Pain Management Interventions: repositioned  Taken 12/6/2024 0900 by Sona Soares RN  Pain Management Interventions: medication (see MAR)  Intervention: Provide Person-Centered Care  Recent Flowsheet Documentation  Taken 12/6/2024 0800 by Sona Soares RN  Trust Relationship/Rapport:   care explained   choices provided   emotional support provided   empathic listening provided   questions answered   questions encouraged   reassurance provided   thoughts/feelings acknowledged   Goal Outcome Evaluation:     Plan of Care Reviewed With: patient and daughter      Overall Patient Progress: no change     Outcome Evaluation: Patient still rating pain 8-9/10. PRN oxy given x2. Scheduled Tylenol given. Patient turned and repositioned Q2. Patient weight shifting/ repositioned in chair.     Shift 3070-0084     Neuro: A&Ox3. Intermittent confusion.   Cardiac: VSS.  Respiratory: Sating >90% on RA. Spot check.   GI/: Adequate urine output via suprapubic catheter and L nephrostomy  Diet/appetite: Cyclic TF 8p-8a @ 50mL/hr via NG. Soft and bite-sized diet w/ thin liquids. Aspiration risk. Poor appetite. Calorie counts x3 days.   Activity: Lift/Q2 reposition. Up in chair for most of the day. Weight shifting and moving in chair.  Pain: Rated pain 8-9/10. PRN oxy x2. Scheduled tylenol given.   Skin: Sacral wound- mepi C/D/I.  LDA's: NG, R PIV, R Port, L nephrostomy.        Shift updates: Patients evening BG 69. 30 CHO given. Recheck 88.   Plan: Find placement for discharge to TCU. Continue with POC. Notify primary team with changes.      Sona Soares RN on 12/6/2024 at 6:53 PM

## 2024-12-07 NOTE — PROGRESS NOTES
Gynecology Oncology Progress Note  12/07/2024    Ms. 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 She had significant delirium overnight and was unable to sleep. She was oriented to person and year this morning, but not place, and was speaking nonsensically. Reports her abdominal pain is at baseline. No headache, blurry vision, chest pain, SOB, difficulty breathing. Tube feeds are cycling at 50 ml/hr overnight, and will be turned off during the day. Her hemoglobin dropped to 6.5 today and she will receive a blood transfusion.     Objective:   Patient Vitals for the past 24 hrs:   BP Temp Temp src Pulse Resp SpO2   12/07/24 0834 97/61 98.2  F (36.8  C) Oral 84 18 100 %   12/07/24 0446 112/72 -- -- -- 18 99 %   12/06/24 2329 105/70 97.8  F (36.6  C) Oral -- 16 100 %   12/06/24 2155 -- -- -- -- -- 100 %   12/06/24 2117 -- -- -- -- 18 --   12/06/24 1941 120/73 98  F (36.7  C) Oral -- 16 --   12/06/24 1537 110/71 97.9  F (36.6  C) Oral 92 16 91 %   12/06/24 1130 -- -- -- -- -- 94 %   12/06/24 1059 122/86 98.4  F (36.9  C) Oral 97 18 100 %      General: cachectic-appearing female, in NAD, NG tube in place, under multiple layers of blankets  CV: Regular rate  Resp: Non-labored breathing on room air  Abdomen: Soft, moderately tender in the supra-pubic area around catheter, non-distended  Extremities: 2+ bilateral lower extremity edema, wrapped with ace-bandages.  Lines/Drains: Supra-pubic catheter site non-erythematous and suprapubic catheter is draining yellow urine. L PNT with bloody appearing urine  Neuro: Actively hallucinating, oriented to name and year     Drains: suprapubic catheter, port, pIV, PNT (L)      UOP: 1.54 ml/kg/hr    Recent Results (from the past 24 hours)    Glucose by meter    Collection Time: 12/07/24  4:18 AM   Result Value Ref Range    GLUCOSE BY METER POCT 105 (H) 70 - 99 mg/dL   Magnesium    Collection Time: 12/07/24  4:24 AM   Result Value Ref Range    Magnesium 2.0 1.7 - 2.3 mg/dL   Phosphorus    Collection Time: 12/07/24  4:24 AM   Result Value Ref Range    Phosphorus 2.6 2.5 - 4.5 mg/dL   Basic metabolic panel    Collection Time: 12/07/24  4:24 AM   Result Value Ref Range    Sodium 143 135 - 145 mmol/L    Potassium 4.8 3.4 - 5.3 mmol/L    Chloride 115 (H) 98 - 107 mmol/L    Carbon Dioxide (CO2) 25 22 - 29 mmol/L    Anion Gap 3 (L) 7 - 15 mmol/L    Urea Nitrogen 22.0 8.0 - 23.0 mg/dL    Creatinine 0.68 0.51 - 0.95 mg/dL    GFR Estimate >90 >60 mL/min/1.73m2    Calcium 7.6 (L) 8.8 - 10.4 mg/dL    Glucose 113 (H) 70 - 99 mg/dL   CBC with platelets    Collection Time: 12/07/24  4:24 AM   Result Value Ref Range    WBC Count 5.9 4.0 - 11.0 10e3/uL    RBC Count 2.09 (L) 3.80 - 5.20 10e6/uL    Hemoglobin 6.5 (LL) 11.7 - 15.7 g/dL    Hematocrit 20.8 (L) 35.0 - 47.0 %     78 - 100 fL    MCH 31.1 26.5 - 33.0 pg    MCHC 31.3 (L) 31.5 - 36.5 g/dL    RDW 17.8 (H) 10.0 - 15.0 %    Platelet Count 151 150 - 450 10e3/uL   Prepare red blood cells (unit)    Collection Time: 12/07/24  7:13 AM   Result Value Ref Range    Blood Component Type Red Blood Cells     Product Code W0812X50     Unit Status Transfused     Unit Number [id number]     CROSSMATCH Compatible     CODING SYSTEM [id number]     ISSUE DATE AND TIME [id number]     UNIT ABO/RH O+     UNIT TYPE ISBT 5100    Glucose by meter    Collection Time: 12/07/24  8:41 AM   Result Value Ref Range    GLUCOSE BY METER POCT 108 (H) 70 - 99 mg/dL   CBC with platelets    Collection Time: 12/07/24  9:06 AM   Result Value Ref Range    WBC Count 6.4 4.0 - 11.0 10e3/uL    RBC Count 2.08 (L) 3.80 - 5.20 10e6/uL    Hemoglobin 6.4 (LL) 11.7 - 15.7 g/dL    Hematocrit 20.9 (L) 35.0 - 47.0 %     (H) 78 - 100 fL    MCH 30.8  26.5 - 33.0 pg    MCHC 30.6 (L) 31.5 - 36.5 g/dL    RDW 17.6 (H) 10.0 - 15.0 %    Platelet Count 163 150 - 450 10e3/uL   Adult Type and Screen    Collection Time: 12/07/24  9:11 AM   Result Value Ref Range    ABO/RH(D) O POS     Antibody Screen Negative Negative    SPECIMEN EXPIRATION DATE [id number]    Glucose by meter    Collection Time: 12/07/24 12:41 PM   Result Value Ref Range    GLUCOSE BY METER POCT 39 (LL) 70 - 99 mg/dL   Glucose by meter    Collection Time: 12/07/24  1:41 PM   Result Value Ref Range    GLUCOSE BY METER POCT 101 (H) 70 - 99 mg/dL   Glucose by meter    Collection Time: 12/07/24  4:33 PM   Result Value Ref Range    GLUCOSE BY METER POCT 27 (LL) 70 - 99 mg/dL   Hemoglobin    Collection Time: 12/07/24  5:24 PM   Result Value Ref Range    Hemoglobin 8.3 (L) 11.7 - 15.7 g/dL   Glucose by meter    Collection Time: 12/07/24  5:24 PM   Result Value Ref Range    GLUCOSE BY METER POCT 97 70 - 99 mg/dL   Glucose by meter    Collection Time: 12/07/24  8:25 PM   Result Value Ref Range    GLUCOSE BY METER POCT 77 70 - 99 mg/dL     Assessment/Plan: Alis Hartman is a 69 year old female with serous endometrial adenocarcinoma of uterus, currently s/p 3 cycles carboplatin/paclitaxel who is HD#11, admitted with mental status changes attributed to UTI/urosepsis.     #MDD/Schizophrenia  #Waxing and Waning Mental Status  #C/f Delirium   #Hallucinations  Patient having new-onset hallucinations overnight. Will consult psych and speak with patient's daughter about possibly restarting psych meds.  - Psych consult: hold ambien, seroquel, gabapentin, and zoloft.   - No further recommendations from psych    #Urosepsis, resolved  - s/p 10d ertapenem.   - No suppressive therapy per ID     #CKD stage 3a  - Continue to avoid nephrotoxic medications    #L subclavian/axillary non occlusive DVT around PICC  - Transitioned to PO anticoagulation, will transition over to long-term dose of 5mg BID today.     #Serous  "endometrial adenocarcinoma of uterus  - No further systemic therapy planned per pt's primary oncologist given multiple admissions and poor functional status    #Severe Malnutrition  - Replace electrolytes PRN  - Encourage PO intake, calorie counts in process  - Continue TF per nutrtition     #Atrial fibrillation on eliquis  #Concern for chronotropic incompetence, resolved  -EP does not recommend pacemaker or restarting atenolol. Pt has outpatient follow up arranged for February    #Chronic stable conditions  - Anemia of chronic disease: Hemoglobin <7, will transfuse x1 unit today  - Asthma: PTA albuterol   - Hx/o gastric bypass - avoid NSAIDs  - Stage 2 Sacral/Coccygeal Pressure Ulcer- WOC consulted; appreciate cares.      Code status: After discussion with patient and daughter, patient, remains full code.     Clinically Significant Risk Factors     # Obesity: Estimated body mass index is 31.75 kg/m  as calculated from the following:    Height as of this encounter: 1.6 m (5' 3\").    Weight as of this encounter: 81.3 kg (179 lb 3.7 oz).    # Severe Malnutrition: based on nutrition assessment      Dispo: Medically ready for discharge when safe discharge can be arranged at TCU. Referrals placed.    Patient discussed with Dr. Jon Mathis MD  Obstetrics and Gynecology, PGY-2  12/07/2024 9:24 AM     I, Corby Webb MD personally examined and evaluated this patient on 12/07/24 at 8AM.  I discussed the patient with the resident and care team, and agree with the assessment and plan of care as documented in the residents note above.    I personally reviewed vital signs, laboratory values and imaging results.      Lilliam Webb MD  Gynecologic Oncology  [facility]    "

## 2024-12-07 NOTE — PROGRESS NOTES
"Brief progress note    Presented to bedside for PM rounds. Patient is feeling well, has been eating minimal amounts of food throughout the day. No nausea/vomiting, chest pain, headache, difficulty breathing/SOB. Minimal abdominal pain, moderate sacral ulcer pain. Hasn't been asking for pain medication because, \"they take too long to come.\" Patient was encouraged to request pain medication regardless and agreed. Minimal abdominal pain on palpation, yellow urine in SP catheter bag, red output from L nephrostomy tube, adequate. Patient with one episode of SpO2 < 94%, but recovered to 100% on RA. Vitals otherwise normal. Patient a/o, no increased work of breathing, regular rate. Will continue to monitor overnight.    Ashwin Mathis MD  Gynecologic-Oncology, PGY-2   12/06/2024 9:25 PM    Gyn-Onc pager: [phone]   "

## 2024-12-07 NOTE — PROGRESS NOTES
Gynecology Oncology Progress Note  12/08/2024    Ms. 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 She had one incident of confusion overnight, calling out for her family member who usually stays at home with her. Was easily redirected. No confusion this morning. Endorsing some abdominal pain which she states is her usual but also rated as a 9/10. Otherwise no concerns or questions.    Objective:   Patient Vitals for the past 24 hrs:   BP Temp Temp src Pulse Resp SpO2 Weight   12/08/24 0343 107/76 97.7  F (36.5  C) Oral 95 15 100 % 85.5 kg (188 lb 7.9 oz)   12/07/24 2300 104/75 99.4  F (37.4  C) Oral 93 16 100 % --   12/07/24 2027 -- -- -- -- -- 99 % --   12/07/24 1935 126/72 98.2  F (36.8  C) Oral -- 17 -- --   12/07/24 1535 108/80 98.4  F (36.9  C) Oral -- 18 100 % --   12/07/24 1330 129/76 98.4  F (36.9  C) -- 98 18 -- --   12/07/24 1128 112/65 98.6  F (37  C) Axillary 96 18 100 % --   12/07/24 1100 113/80 98.5  F (36.9  C) Axillary 101 -- 97 % --   12/07/24 0834 97/61 98.2  F (36.8  C) Oral 84 18 100 % --      General: cachectic-appearing female, in NAD, NG tube in place, under multiple layers of blankets  CV: Regular rate  Resp: Non-labored breathing on room air  Abdomen: Soft, moderately tender in the supra-pubic area around catheter, non-distended  Extremities: 2+ bilateral lower extremity edema, wrapped with ace-bandages.  Lines/Drains: Supra-pubic catheter site non-erythematous and suprapubic catheter is draining yellow urine. L PNT with bloody appearing urine  Neuro: A&O to time, situation and self    Drains: suprapubic catheter, port, pIV, PNT  (L)    Intake (24h//since MN)  PO: 150 mL // none documented  IV: 10 mL// 10mL  NG/GT: 150 mL// 60 mL  Enteral: 550 mL// 250 mL    Output:  PNT: 975  mL//90 mL  Suprapubic cath: 1250 mL//350 mL  Stool: 1x//1x      Recent Results (from the past 24 hours)   Prepare red blood cells (unit)    Collection Time: 12/07/24  7:13 AM   Result Value Ref Range    Blood Component Type Red Blood Cells     Product Code B8024V67     Unit Status Transfused     Unit Number [id number]     CROSSMATCH Compatible     CODING SYSTEM XDQB790     ISSUE DATE AND TIME [id number]     UNIT ABO/RH O+     UNIT TYPE ISBT 5100    Glucose by meter    Collection Time: 12/07/24  8:41 AM   Result Value Ref Range    GLUCOSE BY METER POCT 108 (H) 70 - 99 mg/dL   CBC with platelets    Collection Time: 12/07/24  9:06 AM   Result Value Ref Range    WBC Count 6.4 4.0 - 11.0 10e3/uL    RBC Count 2.08 (L) 3.80 - 5.20 10e6/uL    Hemoglobin 6.4 (LL) 11.7 - 15.7 g/dL    Hematocrit 20.9 (L) 35.0 - 47.0 %     (H) 78 - 100 fL    MCH 30.8 26.5 - 33.0 pg    MCHC 30.6 (L) 31.5 - 36.5 g/dL    RDW 17.6 (H) 10.0 - 15.0 %    Platelet Count 163 150 - 450 10e3/uL   Adult Type and Screen    Collection Time: 12/07/24  9:11 AM   Result Value Ref Range    ABO/RH(D) O POS     Antibody Screen Negative Negative    SPECIMEN EXPIRATION DATE [id number]    Glucose by meter    Collection Time: 12/07/24 12:41 PM   Result Value Ref Range    GLUCOSE BY METER POCT 39 (LL) 70 - 99 mg/dL   Glucose by meter    Collection Time: 12/07/24  1:41 PM   Result Value Ref Range    GLUCOSE BY METER POCT 101 (H) 70 - 99 mg/dL   Glucose by meter    Collection Time: 12/07/24  4:33 PM   Result Value Ref Range    GLUCOSE BY METER POCT 27 (LL) 70 - 99 mg/dL   Hemoglobin    Collection Time: 12/07/24  5:24 PM   Result Value Ref Range    Hemoglobin 8.3 (L) 11.7 - 15.7 g/dL   Glucose by meter    Collection Time: 12/07/24  5:24 PM   Result Value Ref Range    GLUCOSE BY METER POCT 97 70 - 99 mg/dL    Glucose by meter    Collection Time: 12/07/24  8:25 PM   Result Value Ref Range    GLUCOSE BY METER POCT 77 70 - 99 mg/dL   Glucose by meter    Collection Time: 12/07/24 10:52 PM   Result Value Ref Range    GLUCOSE BY METER POCT 85 70 - 99 mg/dL   Glucose by meter    Collection Time: 12/08/24  1:57 AM   Result Value Ref Range    GLUCOSE BY METER POCT 98 70 - 99 mg/dL   Magnesium    Collection Time: 12/08/24  3:47 AM   Result Value Ref Range    Magnesium 2.2 1.7 - 2.3 mg/dL   Phosphorus    Collection Time: 12/08/24  3:47 AM   Result Value Ref Range    Phosphorus 3.0 2.5 - 4.5 mg/dL   Basic metabolic panel    Collection Time: 12/08/24  3:47 AM   Result Value Ref Range    Sodium 140 135 - 145 mmol/L    Potassium 4.6 3.4 - 5.3 mmol/L    Chloride 113 (H) 98 - 107 mmol/L    Carbon Dioxide (CO2) 23 22 - 29 mmol/L    Anion Gap 4 (L) 7 - 15 mmol/L    Urea Nitrogen 22.0 8.0 - 23.0 mg/dL    Creatinine 0.64 0.51 - 0.95 mg/dL    GFR Estimate >90 >60 mL/min/1.73m2    Calcium 7.8 (L) 8.8 - 10.4 mg/dL    Glucose 103 (H) 70 - 99 mg/dL   CBC with platelets    Collection Time: 12/08/24  3:47 AM   Result Value Ref Range    WBC Count 4.4 4.0 - 11.0 10e3/uL    RBC Count 2.47 (L) 3.80 - 5.20 10e6/uL    Hemoglobin 7.7 (L) 11.7 - 15.7 g/dL    Hematocrit 23.9 (L) 35.0 - 47.0 %    MCV 97 78 - 100 fL    MCH 31.2 26.5 - 33.0 pg    MCHC 32.2 31.5 - 36.5 g/dL    RDW 16.9 (H) 10.0 - 15.0 %    Platelet Count 150 150 - 450 10e3/uL     Assessment/Plan: Alis Hartman is a 69 year old female with serous endometrial adenocarcinoma of uterus, currently s/p 3 cycles carboplatin/paclitaxel who is HD#12, admitted with mental status changes attributed to UTI/urosepsis. She was weaned off of pressors in the evening of 12/1, and has been able to maintain an appropriate blood pressure since.     #MDD/Schizophrenia  #Waxing and Waning Mental Status  #C/f Delirium   #Hallucinations  Patient having new-onset hallucinations overnight. Will consult psych  and speak with patient's daughter about possibly restarting psych meds.  - Psych consult 12/7:  At this time, no indication to resume psychiatric medications.  Delirium precautions:  Up during the day with lights on, blinds open  Lights off at night, avoid interruptions during the night as much as possible  Family visits, mementos from home (family photos, favorite blanket, ect) can be helpful  Encourage use of sensory aids (hearing aid, eyeglasses)  Frequent reorientation  Avoid opioids, benzodiazepines, anticholinergics if possible    Continue to ensure proper nutrition, fluid and electrolyte balance. Monitor for infections, hypoxia, metabolic derangements, or other causes of delirium.     #Urosepsis, resolved  - s/p 10d ertapenem.   - No suppressive therapy per ID     #CKD stage 3a  - Continue to avoid nephrotoxic medications    #L subclavian/axillary non occlusive DVT around PICC  - Transitioned to PO anticoagulation, will transition over to long-term dose of 5mg BID today.     #Serous endometrial adenocarcinoma of uterus  - No further systemic therapy planned per pt's primary oncologist given multiple admissions and poor functional status    #Severe Malnutrition  - Replace electrolytes PRN  - Encourage PO intake, calorie counts in process  - Radiology is hesitant to exchange NG for NJ given history of gastric bypass but this may complicate TCU placement so will continue to readdress with radiology as needed.     #Atrial fibrillation on eliquis  #Concern for chronotropic incompetence, resolved  -EP does not recommend pacemaker or restarting atenolol. Pt has outpatient follow up arranged for February    #Chronic stable conditions  - Anemia of chronic disease: s/p tranfusion yesterday, Hemoglobin 8.3 yesterday, 7.7 today  - Asthma: PTA albuterol   - Hx/o gastric bypass - avoid NSAIDs  - Stage 2 Sacral/Coccygeal Pressure Ulcer- WOC consulted; appreciate cares.      Code status: After discussion with patient and  "daughter, patient, remains full code.     Clinically Significant Risk Factors     # Obesity: Estimated body mass index is 33.39 kg/m  as calculated from the following:    Height as of this encounter: 1.6 m (5' 3\").    Weight as of this encounter: 85.5 kg (188 lb 7.9 oz).    # Severe Malnutrition: based on nutrition assessment      Dispo: Possibly later today. Per SW yesterday transfer ready today.    Patient discussed with Dr. Fabrice Lopez MD  [facility]  Gynecology Oncology, PGY-2  12/08/2024 6:51 AM    To reach the GYNECOLOGY ONCOLOGY team for this patient, please page [phone]    "

## 2024-12-07 NOTE — PROGRESS NOTES
Calorie Count  Intake recorded for: 12/6  Total Kcals: 367 Total Protein: 11g  Kcals from Hospital Food: 367   Protein: 11g  Kcals from Outside Food (average):0 Protein: 0g  # Meals Ordered from Kitchen: 3 meals ordered  # Meals Recorded: 2 meals recorded  Meal 1; 25% oatmeal w/brown sugar, 4oz 1% milk  Meal 2: less than 25% 6oz tomato soup, jello, hot cocoa  # Supplements Recorded: none recorded

## 2024-12-07 NOTE — PROGRESS NOTES
"Care Management Follow Up    Length of Stay (days): 10    Expected Discharge Date: 2024     Concerns to be Addressed: all concerns addressed in this encounter     Patient plan of care discussed at interdisciplinary rounds: Yes     Anticipated Discharge Disposition:  Transitional Care Unit  Anticipated Discharge Services:  TBD  Anticipated Discharge DME:  TBD     Patient/family educated on Medicare website which has current facility and service quality ratings:  yes  Education Provided on the Discharge Plan:  yes  Patient/Family in Agreement with the Plan: yes      Accepted:  [facility]  [address]  [place]  [address]  P: [phone]  F: [phone]   : SW sent initial EPIC referral.  : Noted to be approved. *Per [facility], [facility] Address [address], [facility] [address] *  Able to admit tomorrow, .    Pending:  [facility]  [address] [place]  [address]  P: [phone]  P: [phone] - Admissions  F: [phone]   : SW sent initial EPIC referral.  : EPIC referral pending, \"They do not have access to Epic. Please fax referrals to [phone].\" [facility] SW sent via fax machine.     [facility]  [address], [place] [address]  P: [phone]  F: [phone]  Joselito Alcaraz liaison: [phone]   12: BANDAR sent initial EPIC referral.    [facility]  [address] [place] [address]  Ph: [phone]  [place]issa: [phone]  F: [phone]   12: BANDAR sent initial EPIC referral.     [facility]  [address]  [place] [address]  Ph: [phone]  Lissa - [phone]  F: [phone]   12: BANDAR sent initial EPIC referral.    Declined:  [facility]  [address] [place] [address]  Ph: [phone]  F: [phone]   : BANDAR sent initial EPIC referral.  : Declined, no bed available.    Private pay " costs discussed: Not applicable    Discussed  Partnership in Safe Discharge Planning  document with patient/family: No     Handoff Completed: No, handoff not indicated or clinically appropriate    Additional Information:  BANDAR faxed over [facility] SNF referral, per Epic request. However, pt appears to have been accepted at [facility], NR said they would just need a call re: when pt is ready and what time. They would be available Sunday or Monday.    BANDAR attempted to discuss with pt bedside, but pt was limited in interaction and appeared to be in pain.     Pt will be a lift, so will need an ambulance transfer.    Next Steps: Gyn/Onc to assess if still med ready, there was some concern overnight with altered mental status. Bedside RN concerned with PO and NG tube vs. PEG. Per conversation with Gyn/Onc, pt would be ready to go tomorrow, mid afternoon. Per bedside RN, however, there are concerns for nutrition and need for tube feeding. RN has message out to gyn/onc re: these nutritional concerns. Concern for readmit if nutrition not properly managed. May need PEG placed.    BANDAR provided update to daughter, she would like to do her research on the facility before they place her. BANDAR informed that [facility] is working today only, so will provide handoff to tomorrow's SW as well.    Claudia Canas, MSW, LICSW  12/7/2024       Social Work and Care Management Department       SEARCHABLE in [facility] - search SOCIAL WORK       [place] (0800 - 1630) Saturday and Sunday     Units: 4A Vocera, 4C Vocera, & 4E Vocera        Units: 5A 5499-8545 Vocera, 5A 2956-9928 Vocera , BMT SW 1 BMT SW 2, BMT SW 3 & BMT SW 4  5C Off Service 5401 - 5416  5C Off Service 8450-0231     Units: 6A Vocera & 6B Vocera      Units: 6C Vocera     Units: 7A Vocera & 7B Vocera      Units: 7C Med Surg 7401 thru 7418 and 7C Med Surg 7502 thru 7521      Unit: [place] ED Vocera & [place] Obs Vocera     [facility] (3905-7339) Saturday and Sunday       Units: 5 Ortho Vocera, 5 Med Surg Vocera & WB ED Vocera     Units: 6 Med Surg Vocera, 8 Med Surg Vocera, & 10 ICU Vocera      After hours Vocera [place] and After Hours Vocera [place]     Please NOTE changes to times below:    **Saturday & Sunday (1630 - 2030)    **Mon-Fri (9842-6386)     **[facility] Recognized Holidays  (9215-3419)    Units: ALL   - see above VOCERA links to units         detailed exam

## 2024-12-07 NOTE — PLAN OF CARE
[id number]    Neuro: A&Ox2-3. Intermittent confusion. Intermittent agitation  Cardiac: VSS.  Respiratory: Sating >97% on RA.   GI/: Adequate urine output via suprapubic catheter and L nephrostomy  Diet/appetite: Cyclic TF 8p-8a @ 50mL/hr via NG and soft, bite-sized diet w/ thin liquids.   Activity: Up w/ lift. Q2 repo.   Pain: Rated pain 8/10. PRN oxy x1. Refused scheduled Tylenol, education given, continued refusal.   Skin: Sacral wound, mepilex changed.   LDA's: NG, R Port, L nephrostomy   Endo: Q4 BS via port  +: K+, phos, mag     Shift updates: Notified provider that pt was asking if the dog was back in the room, if her grandson was outside the door, and was speaking to what appeared not to be directed at me.   Hgb 6.5, provider notified, orders for RBCs and type and screen obtained.   Plan: Continue with POC. Notify primary team with changes.           Goal Outcome Evaluation:       Abd and sacral pain, PRN oxy given. q2 repo. TF @ 50 mL/hr. Up to chair today. Increased aggitation  Plan of Care Reviewed With: patient  Overall Patient Progress: no change  Outcome: Not Progressing

## 2024-12-08 ENCOUNTER — APPOINTMENT (OUTPATIENT)
Dept: GENERAL RADIOLOGY | Facility: CLINIC | Age: 69
DRG: 698 | End: 2024-12-08
Payer: COMMERCIAL

## 2024-12-08 VITALS
SYSTOLIC BLOOD PRESSURE: 125 MMHG | OXYGEN SATURATION: 99 % | TEMPERATURE: 98.8 F | HEIGHT: 63 IN | WEIGHT: 188.49 LBS | BODY MASS INDEX: 33.4 KG/M2 | RESPIRATION RATE: 16 BRPM | HEART RATE: 93 BPM | DIASTOLIC BLOOD PRESSURE: 77 MMHG

## 2024-12-08 LAB
ANION GAP SERPL CALCULATED.3IONS-SCNC: 4 MMOL/L (ref 7–15)
BUN SERPL-MCNC: 22 MG/DL (ref 8–23)
CALCIUM SERPL-MCNC: 7.8 MG/DL (ref 8.8–10.4)
CHLORIDE SERPL-SCNC: 113 MMOL/L (ref 98–107)
CREAT SERPL-MCNC: 0.64 MG/DL (ref 0.51–0.95)
EGFRCR SERPLBLD CKD-EPI 2021: >90 ML/MIN/1.73M2
ERYTHROCYTE [DISTWIDTH] IN BLOOD BY AUTOMATED COUNT: 16.9 % (ref 10–15)
GLUCOSE BLDC GLUCOMTR-MCNC: 74 MG/DL (ref 70–99)
GLUCOSE BLDC GLUCOMTR-MCNC: 98 MG/DL (ref 70–99)
GLUCOSE SERPL-MCNC: 103 MG/DL (ref 70–99)
HCO3 SERPL-SCNC: 23 MMOL/L (ref 22–29)
HCT VFR BLD AUTO: 23.9 % (ref 35–47)
HGB BLD-MCNC: 7.7 G/DL (ref 11.7–15.7)
MAGNESIUM SERPL-MCNC: 2.2 MG/DL (ref 1.7–2.3)
MCH RBC QN AUTO: 31.2 PG (ref 26.5–33)
MCHC RBC AUTO-ENTMCNC: 32.2 G/DL (ref 31.5–36.5)
MCV RBC AUTO: 97 FL (ref 78–100)
PHOSPHATE SERPL-MCNC: 3 MG/DL (ref 2.5–4.5)
PLATELET # BLD AUTO: 150 10E3/UL (ref 150–450)
POTASSIUM SERPL-SCNC: 4.6 MMOL/L (ref 3.4–5.3)
RBC # BLD AUTO: 2.47 10E6/UL (ref 3.8–5.2)
SODIUM SERPL-SCNC: 140 MMOL/L (ref 135–145)
WBC # BLD AUTO: 4.4 10E3/UL (ref 4–11)

## 2024-12-08 PROCEDURE — 84100 ASSAY OF PHOSPHORUS: CPT

## 2024-12-08 PROCEDURE — 74018 RADEX ABDOMEN 1 VIEW: CPT | Mod: 26 | Performed by: RADIOLOGY

## 2024-12-08 PROCEDURE — 250N000013 HC RX MED GY IP 250 OP 250 PS 637

## 2024-12-08 PROCEDURE — 250N000013 HC RX MED GY IP 250 OP 250 PS 637: Performed by: INTERNAL MEDICINE

## 2024-12-08 PROCEDURE — 250N000011 HC RX IP 250 OP 636

## 2024-12-08 PROCEDURE — 250N000013 HC RX MED GY IP 250 OP 250 PS 637: Performed by: NURSE PRACTITIONER

## 2024-12-08 PROCEDURE — 82374 ASSAY BLOOD CARBON DIOXIDE: CPT

## 2024-12-08 PROCEDURE — 80048 BASIC METABOLIC PNL TOTAL CA: CPT

## 2024-12-08 PROCEDURE — 85018 HEMOGLOBIN: CPT

## 2024-12-08 PROCEDURE — 83735 ASSAY OF MAGNESIUM: CPT

## 2024-12-08 PROCEDURE — 74018 RADEX ABDOMEN 1 VIEW: CPT | Mod: 77

## 2024-12-08 PROCEDURE — 74018 RADEX ABDOMEN 1 VIEW: CPT

## 2024-12-08 RX ORDER — HEPARIN SODIUM (PORCINE) LOCK FLUSH IV SOLN 100 UNIT/ML 100 UNIT/ML
5-10 SOLUTION INTRAVENOUS
Status: DISCONTINUED | OUTPATIENT
Start: 2024-12-08 | End: 2024-12-08 | Stop reason: HOSPADM

## 2024-12-08 RX ORDER — HEPARIN SODIUM,PORCINE 10 UNIT/ML
5-10 VIAL (ML) INTRAVENOUS EVERY 24 HOURS
Status: DISCONTINUED | OUTPATIENT
Start: 2024-12-08 | End: 2024-12-08 | Stop reason: HOSPADM

## 2024-12-08 RX ORDER — HEPARIN SODIUM,PORCINE 10 UNIT/ML
5-10 VIAL (ML) INTRAVENOUS
Status: DISCONTINUED | OUTPATIENT
Start: 2024-12-08 | End: 2024-12-08 | Stop reason: HOSPADM

## 2024-12-08 RX ADMIN — CALCIUM CARBONATE 600 MG (1,500 MG)-VITAMIN D3 400 UNIT TABLET 1 TABLET: at 08:36

## 2024-12-08 RX ADMIN — OXYCODONE HYDROCHLORIDE 5 MG: 5 TABLET ORAL at 13:02

## 2024-12-08 RX ADMIN — Medication 5 ML: at 13:22

## 2024-12-08 RX ADMIN — THIAMINE HCL TAB 100 MG 100 MG: 100 TAB at 08:37

## 2024-12-08 RX ADMIN — Medication 1 TABLET: at 08:36

## 2024-12-08 RX ADMIN — Medication 60 ML: at 08:53

## 2024-12-08 RX ADMIN — ACETAMINOPHEN 650 MG: 325 TABLET, FILM COATED ORAL at 08:37

## 2024-12-08 RX ADMIN — APIXABAN 5 MG: 5 TABLET, FILM COATED ORAL at 08:37

## 2024-12-08 RX ADMIN — OXYCODONE HYDROCHLORIDE 5 MG: 5 TABLET ORAL at 06:07

## 2024-12-08 RX ADMIN — ACETAMINOPHEN 650 MG: 325 TABLET, FILM COATED ORAL at 03:27

## 2024-12-08 RX ADMIN — CYANOCOBALAMIN TAB 500 MCG 500 MCG: 500 TAB at 08:37

## 2024-12-08 ASSESSMENT — ACTIVITIES OF DAILY LIVING (ADL)
ADLS_ACUITY_SCORE: 77
ADLS_ACUITY_SCORE: 76
ADLS_ACUITY_SCORE: 77
ADLS_ACUITY_SCORE: 75
ADLS_ACUITY_SCORE: 76
ADLS_ACUITY_SCORE: 76
ADLS_ACUITY_SCORE: 77
ADLS_ACUITY_SCORE: 76

## 2024-12-08 NOTE — PROGRESS NOTES
Care Management Discharge Note    Discharge Date: 12/08/2024       Discharge Disposition: Transitional Care    Discharge Services:      Discharge DME: None    Discharge Transportation:  (Stretcher transport)    Private pay costs discussed: Not applicable    Does the patient's insurance plan have a 3 day qualifying hospital stay waiver?  No    PAS Confirmation Code: [id number]  Patient/family educated on Medicare website which has current facility and service quality ratings: yes    Education Provided on the Discharge Plan: Yes  Persons Notified of Discharge Plans: Bedside RN Candi, Charge RN, Gyn/Onc providers Mar Chua and Anatoly Avina  Patient/Family in Agreement with the Plan: yes    Handoff Referral Completed: Yes, non-[facility] PCP: External handoff communication completed    Additional Information:  SW coordinated discharge plan among bedside RN, providers, and multiple phone conversations with daughter, Joy. Daughter is supportive and actively engaged in patient's care. She is agreeable to discharge plan and acknowledges verbal receipt of IMM.Writer placed copy of this document in pt's packet to facility. PCS form completed and submitted. Stretcher transport confirmed window is [phone].    Discharge orders faxed to facility.    WILLIAM Tinsley MSW [facility]  12/8/2024       Social Work and Care Management Department       SEARCHABLE in [facility] - search SOCIAL WORK       [place] (0800 - 1630) Saturday and Sunday     Units: 4A Vocera, 4C Vocera, & 4E Vocera        Units: 5A 9071-3213 Vocera, 5A 4136-6132 Vocera , BMT SW 1 BMT SW 2, BMT SW 3 & BMT SW 4  5C Off Service 5401 - 5416  5C Off Service 8081-5190     Units: 6A Vocera & 6B Vocera      Units: 6C Vocera     Units: 7A Vocera & 7B Vocera      Units: 7C Med Surg 7401 thru 7418 and 7C Med Surg 7502 thru 7521      Unit: [place] ED Vocera & [place] Obs Vocera     [facility] (5515-2745) Saturday and Sunday      Units: 5 Ortho  Vocera, 5 Med Surg Vocera & WB ED Vocera     Units: 6 Med Surg Vocera, 8 Med Surg Vocera, & 10 ICU Vocera      After hours Vocera [place] and After Hours Vocera [place]     Please NOTE changes to times below:    **Saturday & Sunday (1630 - 2030)    **Mon-Fri (8510-6055)     **[facility] Recognized Holidays  (3891-7227)    Units: ALL   - see above VOCERA links to units

## 2024-12-08 NOTE — PROGRESS NOTES
Calorie Count  Intake recorded for: 12/7  Total Kcals: 156 Total Protein: 5g  Kcals from Hospital Food: 156   Protein: 5g  Kcals from Outside Food (average):0 Protein: 0g  # Meals Ordered from Kitchen: 3 meals ordered  # Meals Recorded: 3 meals recorded  Meal 1: 10% oatmeal w/ 1% milk  Meal 2: 100% peaches, 10% lorin ice cream  Meal 3: 100% peaches, 10% mac and cheese  # Supplements Recorded: 10% 1 Ensure

## 2024-12-08 NOTE — PLAN OF CARE
Problem: Adult Inpatient Plan of Care  Goal: Optimal Comfort and Wellbeing  Outcome: Progressing  Intervention: Monitor Pain and Promote Comfort  Recent Flowsheet Documentation  Taken 12/7/2024 0834 by Sona Soares RN  Pain Management Interventions:   repositioned   medication (see MAR)  Intervention: Provide Person-Centered Care  Recent Flowsheet Documentation  Taken 12/7/2024 0800 by Sona Soares RN  Trust Relationship/Rapport:   care explained   choices provided   emotional support provided   empathic listening provided   questions answered   questions encouraged   reassurance provided   thoughts/feelings acknowledged   Goal Outcome Evaluation:   Goal Outcome Evaluation:      Plan of Care Reviewed With: patient     Overall Patient Progress: no change     Outcome Evaluation: Patient rating pain 8-9/10. PRN oxy given x2. Scheduled Tylenol given. Patient turned and repositioned Q2.     Shift 9573-0957     Neuro: A&Ox2-3. Intermittent confusion.   Cardiac: VSS.  Respiratory: Sating >90% on RA. Spot check.   GI/: Adequate urine output via suprapubic catheter and L nephrostomy  Diet/appetite: Cyclic TF 8p-8a @ 50mL/hr via NG. Soft and bite-sized diet w/ thin liquids. Aspiration risk. Poor appetite. Calorie counts x3 days.   Activity: Lift/Q2 reposition.   Pain: Rated pain 8-9/10. PRN oxy x2. Scheduled tylenol given.   Skin: Sacral wound- mepi changed.   LDA's: NG, R PIV, R Port, L nephrostomy.         Shift updates: Patient received 1 unit PRBC d/t hemoglobin being 4.8.  Hbg recheck 8.3.  Plan: Possible discharge to TCU tomorrow or Monday. Spoke with team about concerns with poor oral intake. Continue with POC. Notify primary team with changes.      Sona Soares RN on 12/7/2024 at 6:16 PM

## 2024-12-08 NOTE — PROGRESS NOTES
"[facility]  Gyn Onc Evening Rounds    Subjective: doing well, endorses some abdominal discomfort and asking for oxycodone, is amenable to plan for oxycodone before bed to help with sleep.    Objective:  /72 (BP Location: Left arm)   Pulse 98   Temp 98.2  F (36.8  C) (Oral)   Resp 17   Ht 1.6 m (5' 3\")   Wt 81.3 kg (179 lb 3.7 oz)   SpO2 100%   BMI 31.75 kg/m     Gen: NAD  CV: RRR, no MRG  Resp: CTAB anteriorly  Abd: nontender  Ext: dressings in place      Assessment: 69 year old HD#11 for AMS   - no s/s of delirium, appropriate during interview  - delirium precautions  - continue plan from day     Dany Lopez MD  [facility]  Gynecology Oncology, PGY-2  12/07/2024 8:01 PM    To reach the GYNECOLOGY ONCOLOGY team for this patient, please page [phone]    "

## 2024-12-08 NOTE — PLAN OF CARE
Occupational Therapy Discharge Summary    Reason for therapy discharge:    Discharged to transitional care facility.    Progress towards therapy goal(s). See goals on Care Plan in [facility] electronic health record for goal details.  Goals partially met.  Barriers to achieving goals:   discharge from facility.    Therapy recommendation(s):    Continued therapy is recommended.  Rationale/Recommendations:  Increase functional endurance and ADL independence at rehab.

## 2024-12-08 NOTE — PROVIDER NOTIFICATION
Radha Chua GYN/ONC    7570    Sent picture via Radha of skin tear under mepilex found in skin assessment. L side under arm/rib area     Provider agreed with RN plan to cover with Vaseline guaze and mepilex. No further interventions needed at time. Patient discharge to [facility] TCU                  Post chart review, writer noted previous photo media on 12/5

## 2024-12-08 NOTE — PLAN OF CARE
Speech Language Therapy Discharge Summary    Reason for therapy discharge:    Discharged to transitional care facility.    Progress towards therapy goal(s). See goals on Care Plan in [facility] electronic health record for goal details.  Goals partially met.  Barriers to achieving goals:   discharge from facility.    Therapy recommendation(s):    Continued therapy is recommended.  Rationale/Recommendations:  pt's oropharyngeal swallowing abilities are below baseline.    Recommend continue soft and bite sized diet (6) and thin liquids (0).

## 2024-12-08 NOTE — PLAN OF CARE
Neuro: A&Ox2-3, intermittent confusion. One episode of calling out/agitation.   Cardiac: Not on tele. VSS.   Respiratory: Sating >95% on RA.  GI/: Adequate urine output via L neph tube and suprapubic catheter. Loose BM X2  Diet/appetite: Soft/bite sized diet, minimal appetite. Calorie counts. TF 8pm-8am 50 mLs/hr q4h 30 mLs FWF.  Activity:  Assist of  2 with repositioning in bed.  Pain: Managed with medication, repositioning, and heat.   Skin: No new deficits noted.  LDA's: R port SL, R PIV SL. NG, L nephrostomy, suprapubic catheter.    Plan: Possible discharge to TCU today or tomorrow. Continue with POC. Notify primary team with changes.      Goal Outcome Evaluation:      Plan of Care Reviewed With: patient    Overall Patient Progress: no changeOverall Patient Progress: no change    Outcome Evaluation: Abd amd sacral pain managed with medication, repositioning, and heat. TF running @ 50 mL/hr.    Megan Schoenbauer, RN

## 2024-12-08 NOTE — PLAN OF CARE
DISCHARGE                         No discharge date for patient encounter.  ----------------------------------------------------------------------------  Discharged to: [facility]  Via: [facility]  Discharge Instructions: Gave report to Ashkan Gallegos RN and EMS.  Belongings: All sent with pt  IV: d/c'd (Port)  Telemetry: d/c'd     Discharge Paperwork: Signed, copied, and sent home with patient.        Goal Outcome Evaluation:      Plan of Care Reviewed With: patient    Overall Patient Progress: no changeOverall Patient Progress: no change

## 2024-12-09 ENCOUNTER — PATIENT OUTREACH (OUTPATIENT)
Dept: ONCOLOGY | Facility: CLINIC | Age: 69
End: 2024-12-09

## 2024-12-09 DIAGNOSIS — N13.39 OTHER HYDRONEPHROSIS: ICD-10-CM

## 2024-12-09 LAB — GLUCOSE BLDC GLUCOMTR-MCNC: 87 MG/DL (ref 70–99)

## 2024-12-09 NOTE — CONSULTS
Outpatient IR Referral   12/09/24    Referring Provider: Dr. Mar Chua   IR Referral Request: Manage exchange nephro ureteral: Left PNT placed 11/29/24  Per Dr Monroy: recommends antegrade nephrostogram with capping trial, then removal.   Recommendations/Plan: Message to referring team to clarify the IR referral. Pt has as Left PNT. Urology will follow up out patient c/f ureteral stricture per their consult note. IR recommended removal once urosepsis resolved. LEFT antegrade nephrostogram possible capping trial, removal.  IR provider to decide day of procedure.     IR recommendations also relayed Epic messaging.    Patient is on Eliquis no hold for exchanges.     IR referral converted to procedure order.      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 IR placed a left PNT 11/29/24 Patient has left moderate hydronephrotic and MADHU. Recommended removal once urosepsis resolved.    Pertinent Medications:    Current Outpatient Medications   Medication Sig Dispense Refill    acetaminophen (TYLENOL) 325 MG tablet Take 2 tablets (650 mg) by mouth every 6 hours as needed for mild pain 24 tablet 0    albuterol (PROAIR HFA/PROVENTIL HFA/VENTOLIN HFA) 108 (90 Base) MCG/ACT inhaler Inhale 1-2 puffs into the lungs every 4 hours as needed for shortness of breath      apixaban ANTICOAGULANT (ELIQUIS) 5 MG tablet Take 1 tablet (5 mg) by mouth 2 times daily. 60 tablet 1    calcium Citrate-vitamin D 500-400 MG-UNIT CHEW Take 1 tablet by mouth daily      childrens multivitamin w/iron (FLINTSTONES  "COMPLETE) 60 MG chewable tablet Take 2 tablets by mouth daily      cholecalciferol 125 MCG (5000 UT) CAPS Take 5000 mg 4 times a week      cyanocobalamin (CYANOCOBALAMIN) 1000 MCG/ML injection Inject 1 mL (1,000 mcg) into a muscle every month.      diclofenac (VOLTAREN) 1 % topical gel Apply 4 g topically 4 times daily as needed for moderate pain. 50 g 0    ferrous sulfate (CASSANDRA-IN-SOL) 75 (15 FE) MG/ML oral drops Take 15 mg by mouth daily      hydrocortisone 2.5 % cream Apply topically as needed      lidocaine (XYLOCAINE) 5 % external ointment Apply 1 Application topically as needed      melatonin 3 MG tablet Take 1 tablet (3 mg) by mouth or Feeding Tube at bedtime. 30 tablet 0    naloxone (NARCAN) 4 MG/0.1ML nasal spray Spray 1 spray (4 mg) into one nostril alternating nostrils as needed for opioid reversal every 2-3 minutes until assistance arrives 2 each 0    ondansetron (ZOFRAN) 8 MG tablet Take 1 tablet (8 mg) by mouth every 8 hours as needed for nausea 20 tablet 0    oxyCODONE (ROXICODONE) 5 MG tablet Take 1 tablet (5 mg) by mouth every 6 hours as needed for severe pain. 30 tablet 0    oxyCODONE (ROXICODONE) 5 MG tablet Take 1 tablet (5 mg) by mouth every 6 hours as needed for breakthrough pain 30 tablet 0    polyethylene glycol (MIRALAX) 17 GM/Dose powder Take 17 g by mouth daily as needed for constipation 510 g 0    prochlorperazine (COMPAZINE) 5 MG tablet Take 1-2 tablets (5-10 mg) by mouth every 6 hours as needed for nausea or vomiting 40 tablet 0    senna-docusate (SENOKOT-S/PERICOLACE) 8.6-50 MG tablet Take 1 tablet by mouth 2 times daily 60 tablet 0    Skin Protectants, Misc. (INTERDRY 10\"X36\") SHEE Externally apply 10 each topically every 24 hours. Apply to skin folds on abdomen 10 each 1    triamcinolone (KENALOG) 0.1 % external ointment Apply topically 3 times daily as needed for irritation. 30 g 1     No current facility-administered medications for this visit.     Facility-Administered Medications " Ordered in Other Visits   Medication Dose Route Frequency Provider Last Rate Last Admin    heparin lock flush 100 unit/mL injection 500 Units  500 Units Intracatheter Once Dany Perez MD        sodium chloride (PF) 0.9% PF flush 10 mL  10 mL Intracatheter Once Dany Perez MD           Pertinent Imaging Reviewed:   CT 11/27/24     KIDNEYS/BLADDER: Previously noted right hydronephrosis has resolved. Persistent moderate left hydroureteronephrosis which extends down to level of the urinary bladder. Urinary bladder is decompressed with a suprapubic catheter in place.      Most Recent Labs:  Lab Results   Component Value Date    WBC 4.4 12/08/2024    WBC 3.9 01/26/2015     Lab Results   Component Value Date    RBC 2.47 12/08/2024    RBC 4.43 01/26/2015     Lab Results   Component Value Date    HGB 7.7 12/08/2024    HGB 11.9 01/26/2015     Lab Results   Component Value Date    HCT 23.9 12/08/2024    HCT 37.5 01/26/2015     Lab Results   Component Value Date     12/08/2024     01/26/2015    Last Comprehensive Metabolic Panel:  Lab Results   Component Value Date     12/08/2024    POTASSIUM 4.6 12/08/2024    CHLORIDE 113 (H) 12/08/2024    CO2 23 12/08/2024    ANIONGAP 4 (L) 12/08/2024    GLC 74 12/08/2024    BUN 22.0 12/08/2024    CR 0.64 12/08/2024    GFRESTIMATED >90 12/08/2024    SAMUEL 7.8 (L) 12/08/2024      INR   Date Value Ref Range Status   11/27/2024 1.09 0.85 - 1.15 Final            ERINN May CNP  Interventional Radiology   [phone] (IR RN triage)     ADDENDUM 12/11/24     Dr. Mcfarlane recommends: Not to change for nephro U, there is no evidence of ureteral obstruction.   Her left side was chronically hydronephrotic. Her antegrade studies make it appear her ureter is open. It does not go all the way to the bladder. The thought in the OR was the hydronephrosis was due to bladder dysfunction which now doesn't exist due to suprapubic tube.     Recommend:  antegrade  nephrostogram study. If it looks open, then cap for a period of time then remove it.     Order for Left PNT check entered, antegrade nephrostogram, possible capping vs removal.     Karolina PLASENCIA DNP  Interventional Radiology

## 2024-12-09 NOTE — PROGRESS NOTES
[facility] Clinics: Transitions of Care Note  Chief Complaint   Patient presents with    Clinic Care Coordination - Post Hospital     Chart review     Most Recent Admission Date: 11/26/2024   Most Recent Admission Diagnosis: Hypoglycemia - E16.2  Sepsis, due to unspecified organism, unspecified whether acute organ dysfunction present (H) - A41.9     Most Recent Discharge Date: 12/8/2024   Most Recent Discharge Diagnosis: Sepsis, due to unspecified organism, unspecified whether acute organ dysfunction present (H) - A41.9  Hypoglycemia - E16.2  Malignant neoplasm of uterus, unspecified site (H) - C55  Malignant hypertensive kidney disease with chronic kidney disease stage I through stage IV, or unspecified(403.00) - I12.9  Stage 3 chronic kidney disease, unspecified whether stage 3a or 3b CKD (H) - N18.30  Atrial fibrillation, unspecified type (H) - I48.91  Cutaneous-vesicostomy status (H) - Z93.51  Anemia of chronic renal failure, unspecified CKD stage - N18.9, D63.1  Acquired absence of uterus - Z90.710  S/P bilateral salpingo-oophorectomy - Z90.722  Paroxysmal atrial fibrillation (H) - I48.0  S/P hysterectomy - Z90.710  Other hydronephrosis - N13.39  Acute cystitis without hematuria - N30.00  Lymphedema - I89.0  Acute deep vein thrombosis (DVT) of brachial vein, unspecified laterality (H) - I82.629     Notes  Telephone call from writer not indicated at this time, due to the following rationale: Patient was discharged to a Transitional Care Unit (TCU) on 12/8/24:    [facility] - TCU  [place], [place]  Phone: [phone]    Follow up Plan   Patient is scheduled for post-hospital follow up with Dr. Perez on 12/27/24.    Future Appointments   Date Time Provider Department Center   12/9/2024 11:00 AM [facility] PR[facility] NURSE [facility]   12/17/2024  9:00 AM Dany Perez MD [facility]   1/14/2025 12:30 PM [facility] CANCER FAST TRACK LAB [facility] [place]   1/14/2025  1:00 PM Dany Perez  MD [facility]   1/15/2025  1:30 PM [facility]   2/7/2025 10:00 AM [facility]   2/13/2025  9:00 AM Wanda Montiel, APRN CNP [facility]     Nicholas Jarrell, RN, BSN, OCN  RN Care Coordinator - Oncology  [facility]

## 2024-12-09 NOTE — PROGRESS NOTES
[facility]  Carroll Atkins MD, FACS  Jordan Dos Santos NP-C      Date of Service: 2023    Chief Complaint:   Debbie Mcleod is a 72 y.o. female presenting today for her 3 month follow up to her breast cancer diagnosis.  She is due for a Physical Examination.  She reports no interval changes. She stopped her Tamoxifen in January after her  .     History of Present Illness:   Mrs. Mcleod was diagnosed with left locally-advanced breast cancer in 2021 at the age of 69. ER/IA Positive and HER 2 FISH negative. She elected MRM that showed a 2.5 cm Grade III IDC with DCIS.  Margins were negative.  Only 1 of 20 LNs was positive.  Her Oncotype DX Score was 14 and she did not require adjuvant chemotherapy.  She also did not require adjuvant Radiation.  She has a history of Atypical Ductal Hyperplasia and has a family history of breast cancer. [employer] Breast Cancer  Panel with Add-On Genes Negative (). She's been on Arimidex since 2021.->Changed to Tamoxifen in 2021 but she did not start it until the end of 2022. MD:::Robe Hughes MD; Christine Licona MD; Jose Vidal MD; Lui Mahajan MD    Past Medical History:   Diagnosis Date    Abnormal mammogram     Anxiety disorder, unspecified     Breast cancer 2021    Left-Invasive Ductal Carcinoma    Depression     Esophageal reflux     Estrogen receptor positive 09/10/2023    Family history of breast cancer 2023    Fibrocystic mastopathy     Hypercholesterolemia     Malignant neoplasm of thyroid gland     Mastodynia     Nontoxic single thyroid nodule     Osteopenia       Past Surgical History:   Procedure Laterality Date    AUGMENTATION OF BREAST      BACK SURGERY      BIOPSY OF THYROID      BREAST LUMPECTOMY Left     HYSTERECTOMY      MODIFIED RADICAL MASTECTOMY W/ AXILLARY LYMPH NODE DISSECTION Left     TOTAL REDUCTION MAMMOPLASTY  2021        Current Outpatient  Physical Therapy Discharge Summary    Reason for therapy discharge:    Discharged to transitional care facility.    Progress towards therapy goal(s). See goals on Care Plan in [facility] electronic health record for goal details.  Goals not met.  Barriers to achieving goals:   discharge from facility.    Therapy recommendation(s):    Continued therapy is recommended.  Rationale/Recommendations:  Pt is below functional baseline. Pt requires OH lift and A x2 for OOB mobility. Recommend TCU to progress functional independence to level that caregiver can provide..       Medications:     ALPRAZolam (XANAX) 0.25 MG tablet, Take 0.25 mg by mouth every evening., Disp: , Rfl:     cyclobenzaprine (FLEXERIL) 10 MG tablet, Take 10 mg by mouth every evening., Disp: , Rfl:     gabapentin (NEURONTIN) 300 MG capsule, Take 300 mg by mouth 3 (three) times daily., Disp: , Rfl:     PERCOCET 7.5-325 mg per tablet, Take 1 tablet by mouth., Disp: , Rfl:     tamoxifen (NOLVADEX) 20 MG Tab, Take 1 tablet by mouth once daily., Disp: , Rfl:    Review of patient's allergies indicates:   Allergen Reactions    Iodinated contrast media Hives and Itching    Penicillins Anaphylaxis    Cefaclor     Clarithromycin     Doxycycline     Hydrocodone-aspirin     Iodine     Nitrofurantoin monohyd/m-cryst Other (See Comments)    Sulfa (sulfonamide antibiotics)     Sulfamethoxazole-trimethoprim Hives    Sulfur Hives      Social History     Tobacco Use    Smoking status: Some Days    Smokeless tobacco: Never   Substance Use Topics    Alcohol use: Never      Family History   Problem Relation Age of Onset    Breast cancer Sister     Breast cancer Daughter     Breast cancer Maternal Aunt     Ovarian cancer Neg Hx         Review of Systems   Integumentary:  Negative for color change, rash, mole/lesion, breast mass, breast discharge and breast tenderness.   Breast: Negative for mass and tenderness       Physical Exam   Constitutional: She is cooperative.   HENT:   Head: Normocephalic.   Pulmonary/Chest: She exhibits no mass. Right breast exhibits no inverted nipple, no mass, no nipple discharge, no skin change and no tenderness. Left breast exhibits no mass, no skin change and no tenderness. No breast swelling.   The left breast is surgically absent with no signs of malignancy or recurrence.    Abdominal: Normal appearance.   Genitourinary: No breast swelling.   Musculoskeletal: Lymphadenopathy:      Upper Body:      Right upper body: No supraclavicular or axillary adenopathy.      Left upper body: No supraclavicular or  axillary adenopathy.     Neurological: She is alert.   Skin: No rash noted.          ASSESSMENT and PLAN OF CARE     1. Malignant neoplasm of upper-inner quadrant of left breast in female, estrogen receptor positive  Assessment & Plan:  She is doing well and will continue to be followed according to NCCN Guidelines. She understands that her exams have remained stable and is comfortable being followed in a conservative fashion. She understands the importance of monthly self-breast examination and knows to report any and all changes as they occur.      Orders:  -     Lactate Dehydrogenase  -     Comprehensive Metabolic Panel  -     CEA  -     CBC Auto Differential  -     Cancer Antigen 27-29    2. Estrogen receptor positive  Assessment & Plan:  I reiterated the importance of starting her Tamoxifen back and she verbalized understanding.       3. Family history of breast cancer  Assessment & Plan:  We discussed her family history and how it could impact her own future risks.  We discussed family vs. genetic history and the importance and implications of each.  All questions answered to her satisfaction.  She knows that as additional family members are diagnosed - she will need to let us know as this may change follow up and imaging recommendations.    We had a discussion concerning Breast Cancer Risk Reduction and current NCCN Guidelines. She knows that her risk can be lowered slightly with a healthy lifestyle and minimal ETOH use. Being physically active will also help. She should reduce or stay away from OCPs and HRT as possible.         Medical Decision Making: It is my impression that this patient suffers all conditions contained in this medical document.  Each of these conditions did affect our plan of care and my medical decision making today.  It is my opinion that the medical decision making concerning this patient was of moderate difficulty based on the aforementioned conditions.  Any further recommendations  will be communicated to the patient by me.  I have reviewed and verified her allergies, list of medications, medical and surgical histories, social history, and a pertinent review of symptoms.    Follow up:  3 Months and PRN     For:Ultrasound with Dr. Atkins

## 2024-12-12 ENCOUNTER — MEDICAL CORRESPONDENCE (OUTPATIENT)
Dept: HEALTH INFORMATION MANAGEMENT | Facility: CLINIC | Age: 69
End: 2024-12-12

## 2024-12-12 ENCOUNTER — APPOINTMENT (OUTPATIENT)
Dept: CT IMAGING | Facility: CLINIC | Age: 69
End: 2024-12-12
Attending: EMERGENCY MEDICINE
Payer: COMMERCIAL

## 2024-12-12 ENCOUNTER — TRANSFERRED RECORDS (OUTPATIENT)
Dept: HEALTH INFORMATION MANAGEMENT | Facility: CLINIC | Age: 69
End: 2024-12-12

## 2024-12-12 ENCOUNTER — HOSPITAL ENCOUNTER (EMERGENCY)
Facility: CLINIC | Age: 69
Discharge: NURSING, REHAB OR LONG-TERM CARE | End: 2024-12-13
Attending: EMERGENCY MEDICINE | Admitting: EMERGENCY MEDICINE
Payer: COMMERCIAL

## 2024-12-12 DIAGNOSIS — R53.1 GENERALIZED WEAKNESS: ICD-10-CM

## 2024-12-12 DIAGNOSIS — Z46.59 ENCOUNTER FOR NASOGASTRIC (NG) TUBE PLACEMENT: ICD-10-CM

## 2024-12-12 LAB
ABO + RH BLD: NORMAL
ALBUMIN SERPL BCG-MCNC: 2.1 G/DL (ref 3.5–5.2)
ALP SERPL-CCNC: 148 U/L (ref 40–150)
ALT SERPL W P-5'-P-CCNC: 21 U/L (ref 0–50)
ANION GAP SERPL CALCULATED.3IONS-SCNC: 7 MMOL/L (ref 7–15)
AST SERPL W P-5'-P-CCNC: 14 U/L (ref 0–45)
BASOPHILS # BLD AUTO: 0 10E3/UL (ref 0–0.2)
BASOPHILS NFR BLD AUTO: 0 %
BILIRUB SERPL-MCNC: 0.2 MG/DL
BLD GP AB SCN SERPL QL: NEGATIVE
BUN SERPL-MCNC: 10.9 MG/DL (ref 8–23)
CALCIUM SERPL-MCNC: 8.1 MG/DL (ref 8.8–10.4)
CHLORIDE SERPL-SCNC: 113 MMOL/L (ref 98–107)
CREAT SERPL-MCNC: 0.71 MG/DL (ref 0.51–0.95)
EGFRCR SERPLBLD CKD-EPI 2021: 90 ML/MIN/1.73M2
EOSINOPHIL # BLD AUTO: 0 10E3/UL (ref 0–0.7)
EOSINOPHIL NFR BLD AUTO: 1 %
ERYTHROCYTE [DISTWIDTH] IN BLOOD BY AUTOMATED COUNT: 15.8 % (ref 10–15)
GLUCOSE SERPL-MCNC: 79 MG/DL (ref 70–99)
HCO3 SERPL-SCNC: 22 MMOL/L (ref 22–29)
HCT VFR BLD AUTO: 24.7 % (ref 35–47)
HGB BLD-MCNC: 7.9 G/DL (ref 11.7–15.7)
HOLD SPECIMEN: NORMAL
IMM GRANULOCYTES # BLD: 0 10E3/UL
IMM GRANULOCYTES NFR BLD: 0 %
INR PPP: 1.33 (ref 0.85–1.15)
LACTATE SERPL-SCNC: 1 MMOL/L (ref 0.7–2)
LYMPHOCYTES # BLD AUTO: 1 10E3/UL (ref 0.8–5.3)
LYMPHOCYTES NFR BLD AUTO: 25 %
MCH RBC QN AUTO: 31.3 PG (ref 26.5–33)
MCHC RBC AUTO-ENTMCNC: 32 G/DL (ref 31.5–36.5)
MCV RBC AUTO: 98 FL (ref 78–100)
MONOCYTES # BLD AUTO: 0.3 10E3/UL (ref 0–1.3)
MONOCYTES NFR BLD AUTO: 8 %
NEUTROPHILS # BLD AUTO: 2.6 10E3/UL (ref 1.6–8.3)
NEUTROPHILS NFR BLD AUTO: 65 %
NRBC # BLD AUTO: 0 10E3/UL
NRBC BLD AUTO-RTO: 0 /100
PLATELET # BLD AUTO: 229 10E3/UL (ref 150–450)
POTASSIUM SERPL-SCNC: 3.6 MMOL/L (ref 3.4–5.3)
PROT SERPL-MCNC: 4.3 G/DL (ref 6.4–8.3)
RBC # BLD AUTO: 2.52 10E6/UL (ref 3.8–5.2)
SODIUM SERPL-SCNC: 142 MMOL/L (ref 135–145)
SPECIMEN EXP DATE BLD: NORMAL
TROPONIN T SERPL HS-MCNC: 35 NG/L
WBC # BLD AUTO: 4 10E3/UL (ref 4–11)

## 2024-12-12 PROCEDURE — 84155 ASSAY OF PROTEIN SERUM: CPT | Performed by: EMERGENCY MEDICINE

## 2024-12-12 PROCEDURE — 36415 COLL VENOUS BLD VENIPUNCTURE: CPT | Performed by: EMERGENCY MEDICINE

## 2024-12-12 PROCEDURE — 86900 BLOOD TYPING SEROLOGIC ABO: CPT | Performed by: EMERGENCY MEDICINE

## 2024-12-12 PROCEDURE — 84460 ALANINE AMINO (ALT) (SGPT): CPT | Performed by: EMERGENCY MEDICINE

## 2024-12-12 PROCEDURE — 85610 PROTHROMBIN TIME: CPT | Performed by: EMERGENCY MEDICINE

## 2024-12-12 PROCEDURE — 74176 CT ABD & PELVIS W/O CONTRAST: CPT

## 2024-12-12 PROCEDURE — 99285 EMERGENCY DEPT VISIT HI MDM: CPT | Performed by: EMERGENCY MEDICINE

## 2024-12-12 PROCEDURE — 84484 ASSAY OF TROPONIN QUANT: CPT | Performed by: EMERGENCY MEDICINE

## 2024-12-12 PROCEDURE — 85041 AUTOMATED RBC COUNT: CPT | Performed by: EMERGENCY MEDICINE

## 2024-12-12 PROCEDURE — 74176 CT ABD & PELVIS W/O CONTRAST: CPT | Mod: 26 | Performed by: RADIOLOGY

## 2024-12-12 PROCEDURE — 99284 EMERGENCY DEPT VISIT MOD MDM: CPT | Mod: 25

## 2024-12-12 PROCEDURE — 83605 ASSAY OF LACTIC ACID: CPT | Performed by: EMERGENCY MEDICINE

## 2024-12-12 PROCEDURE — 99285 EMERGENCY DEPT VISIT HI MDM: CPT | Mod: 25 | Performed by: EMERGENCY MEDICINE

## 2024-12-12 PROCEDURE — 85004 AUTOMATED DIFF WBC COUNT: CPT | Performed by: EMERGENCY MEDICINE

## 2024-12-12 ASSESSMENT — ACTIVITIES OF DAILY LIVING (ADL)
ADLS_ACUITY_SCORE: 65

## 2024-12-13 ENCOUNTER — APPOINTMENT (OUTPATIENT)
Dept: GENERAL RADIOLOGY | Facility: CLINIC | Age: 69
End: 2024-12-13
Attending: EMERGENCY MEDICINE
Payer: COMMERCIAL

## 2024-12-13 VITALS
SYSTOLIC BLOOD PRESSURE: 125 MMHG | DIASTOLIC BLOOD PRESSURE: 79 MMHG | RESPIRATION RATE: 18 BRPM | BODY MASS INDEX: 33.31 KG/M2 | WEIGHT: 188 LBS | HEART RATE: 97 BPM | TEMPERATURE: 97.7 F | OXYGEN SATURATION: 95 % | HEIGHT: 63 IN

## 2024-12-13 LAB — TROPONIN T SERPL HS-MCNC: 35 NG/L

## 2024-12-13 PROCEDURE — 999N000065 XR ABDOMEN PORT 1 VIEW

## 2024-12-13 PROCEDURE — 74018 RADEX ABDOMEN 1 VIEW: CPT | Mod: 26 | Performed by: RADIOLOGY

## 2024-12-13 ASSESSMENT — ACTIVITIES OF DAILY LIVING (ADL)
ADLS_ACUITY_SCORE: 65

## 2024-12-13 NOTE — DISCHARGE INSTRUCTIONS
Please follow up with your gyn onc team as scheduled.  Continue your current medications without changes.    You may speak with a  at your TCU to help with recommendations on alternative TCUs or home health for longer term care.

## 2024-12-13 NOTE — ED PROVIDER NOTES
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  She was recently hospitalized for cystitis and [facility]  She had an NG tube in place that was used for supplemental nutrition however was pulled out approximately 2 days ago.  She is able to take things by mouth but cannot get additional nutritional support.  She is here with a family member who reports that she has been losing weight recently.  Additionally she has a decubitus ulcer that has been getting progressively worse that she has not been up and moving around at the TCU.  They also note that on laboratory evaluation today she had a hemoglobin down to 7 and more concern for need for possible transfusion.  She denies any melena or hematochezia though has not really been able to check her stool to see if there is any evidence of this.  She has not had any vomiting or hematemesis.  She does have a left percutaneous nephrostomy tube with trace blood in the urine as well as a suprapubic catheter.  On exam it does appear that they PNT tube has been dislodged and is significantly out from the skin.  She denies any recent fevers.  No chest pain or shortness of breath.    Past Medical History  Past Medical History:   Diagnosis Date    Atrial fibrillation (H)     Depression     Hypertension     Malignant neoplasm of endocervix (H)     Tx with radiation    Near syncope 11/7/2024    Orthopnea 9/21/2024    Other chronic pain     Low  back    Schizophrenia (H)     Urinary incontinence      Past Surgical History:   Procedure Laterality Date    ABDOMINOPLASTY      BIOPSY CERVICAL, LOCAL EXCISION, SINGLE/MULTIPLE  10/26/2011    Procedure:BIOPSY CERVICAL, LOCAL EXCISION, SINGLE/MULTIPLE; EUA, cervical biopsies; Surgeon:BETTY TINEO; Location:[facility] OR    BIOPSY VAGINAL N/A 06/08/2021    Procedure: BIOPSY, VAGINA;  Surgeon: Dany Perez MD;  Location: [facility] OR    CYSTOSCOPY N/A 05/17/2024    Procedure: Cystoscopy;  Surgeon: Dany Perez MD;  [facility]    CYSTOSTOMY, INSERT TUBE SUPRAPUBIC, COMBINED  07/12/2024    Procedure: Insertion of supra-pubic catheter;  Surgeon: Dany Perez MD;  [facility]    DILATION AND CURETTAGE, WITH ULTRASOUND GUIDANCE N/A 05/17/2024    Procedure: Dilation and curettage of the uterus with drainage of uterine fluid under ultrasound guidance, lysis of vaginal adhesions;  Surgeon: Dany Perez MD;  [facility]    EXAM UNDER ANESTHESIA PELVIC N/A 03/12/2020    Procedure: Exam under anesthsia, vaginal biopsies, possible CO2 laser of the vagina;  Surgeon: Lilliam Roy MD;  [facility]    GI SURGERY  2004    gastric bypass    HYSTERECTOMY TOTAL ABDOMINAL, BILATERAL SALPINGO-OOPHORECTOMY, COMBINED Bilateral 07/12/2024    Procedure: Diagnostic laparoscopy converted to exploratory laparotomy, total abdominal hysterectomy, bilateral salpingo-oophorectomy, lysis of adhesions >60 min;  Surgeon: Dany Perez MD;  [facility]    INSERT PORT VASCULAR ACCESS N/A 08/26/2024    Procedure: Insert port vascular access;  Surgeon: Avery Gregory MD;  [facility]    IR CHEST PORT PLACEMENT > 5 YRS OF AGE  08/26/2024    IR NEPHROSTOMY TUBE PLACEMENT LEFT  11/29/2024    LASER CO2 VAGINA N/A 03/02/2015    Procedure: LASER CO2 VAGINA;  Surgeon: Mariela Abdalla MD;  Location: [facility] OR    LASER CO2 VAGINA N/A 05/24/2019    Procedure: Exam under anesthesia, vaginal biopsies, CO2 laser of the  "vagina;  Surgeon: Lilliam Roy MD;  Location: [facility] OR    LASER CO2 VAGINA N/A 06/08/2021    Procedure: Exam under anesthesia, laser ablation of the upper vagina;  Surgeon: Dany Perez MD;  Location: [facility]    PICC DOUBLE LUMEN PLACEMENT Left 11/28/2024    left basilic 5 fr dl power picc 44 cm    REPAIR BLADDER N/A 07/12/2024    Procedure: Repair bladder;  Surgeon: Dany Perez MD;  [facility]: [facility]     acetaminophen (TYLENOL) 325 MG tablet  albuterol (PROAIR HFA/PROVENTIL HFA/VENTOLIN HFA) 108 (90 Base) MCG/ACT inhaler  apixaban ANTICOAGULANT (ELIQUIS) 5 MG tablet  calcium Citrate-vitamin D 500-400 MG-UNIT CHEW  childrens multivitamin w/iron (FLINTSTONES COMPLETE) 60 MG chewable tablet  cholecalciferol 125 MCG (5000 UT) CAPS  cyanocobalamin (CYANOCOBALAMIN) 1000 MCG/ML injection  diclofenac (VOLTAREN) 1 % topical gel  ferrous sulfate (CASSANDRA-IN-SOL) 75 (15 FE) MG/ML oral drops  hydrocortisone 2.5 % cream  lidocaine (XYLOCAINE) 5 % external ointment  melatonin 3 MG tablet  naloxone (NARCAN) 4 MG/0.1ML nasal spray  ondansetron (ZOFRAN) 8 MG tablet  oxyCODONE (ROXICODONE) 5 MG tablet  oxyCODONE (ROXICODONE) 5 MG tablet  polyethylene glycol (MIRALAX) 17 GM/Dose powder  prochlorperazine (COMPAZINE) 5 MG tablet  senna-docusate (SENOKOT-S/PERICOLACE) 8.6-50 MG tablet  Skin Protectants, Misc. (INTERDRY 10\"X36\") SHEE  triamcinolone (KENALOG) 0.1 % external ointment      Allergies   Allergen Reactions    Ibuprofen Nausea and Vomiting    Shrimp     Sulfa Antibiotics Rash     Patient started on bactrim 7/24/24 tolerating medication without rash on 7/25      Family History  Family History   Problem Relation Age of Onset    Heart Disease Father     Heart Failure Father     No Known Problems Brother     No Known Problems Brother     No Known Problems Brother     Pancreatitis Brother     Hypertension Sister     Peripheral Vascular Disease Sister     No Known Problems Sister     No Known Problems Son     No Known " "Problems Daughter      Social History   Social History     Tobacco Use    Smoking status: Never    Smokeless tobacco: Never   Substance Use Topics    Alcohol use: Not Currently    Drug use: No      A complete review of systems was performed with pertinent positives and negatives noted in the HPI, and all other systems negative.    Physical Exam   BP: 129/83  Pulse: 95  Temp: 98.4  F (36.9  C)  Resp: 17  Height: 160 cm (5' 3\")  Weight: 85.3 kg (188 lb)  SpO2: 100 %  Physical Exam  General: patient is alert and oriented, fatigued appearing  Head: atraumatic and normocephalic   EENT: moist mucus membranes   Neck: supple   Cardiovascular: regular rate and rhythm, extremities warm and well perfused  Pulmonary: lungs clear to auscultation bilaterally   Abdomen: soft, non-tender, left PNT tube in place though question of partial dislodgement  Musculoskeletal: normal range of motion   Neurological: alert and oriented, moving all extremities symmetrically  Skin: warm, dry, she does have a large decubitus ulcer with trace bleeding, similar appearing to recent hospitalization    ED Course, Procedures, & Data      Procedures                 No results found for any visits on 12/12/24.  Medications - No data to display  Labs Ordered and Resulted from Time of ED Arrival to Time of ED Departure - No data to display  No orders to display          Critical care was not performed.     Medical Decision Making  The patient's presentation was of high complexity (a chronic illness severe exacerbation, progression, or side effect of treatment).    The patient's evaluation involved:  ordering and/or review of 3+ test(s) in this encounter (see separate area of note for details)  discussion of management or test interpretation with another health professional (gyn onc)    The patient's management necessitated moderate risk (prescription drug management including medications given in the ED) and high risk (a decision regarding " hospitalization).    Assessment & Plan    Ms. 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  She is noted to be hemodynamically stable, afebrile and in no respiratory distress.  She did have a CT of the abdomen which shows her PNT tube is in appropriate position with decreased hydronephrosis.  No evidence of expanding decubitus infection or abscess.  Repeat laboratory studies show a hemoglobin of 7.9 which is at her baseline does not have indication for transfusion.  No leukocytosis.  Creatinine is at baseline, no significant electrolyte abnormalities.  CT does show some urothelial enhancement in the setting of recent UTI.  Urine culture was sent.  She denies any other signs or symptoms consistent with ongoing infection and at this time we will wait for culture results.  Discussed with gyn onc and no further intervention from their standpoint.  Her NG tube for nutritional support was replaced in an appropriate position per radiology.  Her TCU was contacted and they are able to take her back.  It does sound like her and her daughter are not happy with the TCU and would like to find alternative placement.  I have recommended that that they speak with the  at her current TCU to review alternative options versus home health management.  At this time does not have indication for hospital admission.  Will discharge with ongoing outpatient management and return precautions.    I have reviewed the nursing notes. I have reviewed the findings, diagnosis, plan and need for follow up with the patient.    New Prescriptions    No medications on file       Final diagnoses:   Encounter for  nasogastric (NG) tube placement   Generalized weakness     Danica Merlos MD  [facility] EMERGENCY DEPARTMENT  12/12/2024     Danica Merlos MD  12/13/24 [id number]

## 2024-12-13 NOTE — ED TRIAGE NOTES
Pt BIBA for G-tube dislodgment \Replacement .     Triage Assessment (Adult)       Row Name 12/12/24 [id number]          Triage Assessment    Airway WDL WDL     Additional Documentation Breath Sounds (Group)        Respiratory WDL    Respiratory WDL WDL        Breath Sounds    Breath Sounds All Fields        Skin Circulation/Temperature WDL    Skin Circulation/Temperature WDL WDL        Cardiac WDL    Cardiac WDL WDL     Cardiac Rhythm NSR        Peripheral/Neurovascular WDL    Peripheral Neurovascular WDL WDL        Cognitive/Neuro/Behavioral WDL    Cognitive/Neuro/Behavioral WDL WDL

## 2024-12-17 ENCOUNTER — ONCOLOGY VISIT (OUTPATIENT)
Dept: ONCOLOGY | Facility: CLINIC | Age: 69
End: 2024-12-17
Attending: OBSTETRICS & GYNECOLOGY
Payer: COMMERCIAL

## 2024-12-17 DIAGNOSIS — C55 SEROUS ADENOCARCINOMA OF UTERUS (H): Primary | ICD-10-CM

## 2024-12-17 DIAGNOSIS — J02.9 SORE THROAT: ICD-10-CM

## 2024-12-17 PROCEDURE — 99215 OFFICE O/P EST HI 40 MIN: CPT | Performed by: OBSTETRICS & GYNECOLOGY

## 2024-12-17 NOTE — LETTER
12/17/2024      Alis Hartman  [address]  [place] [address]      Dear Colleague,    Thank you for referring your patient, Alis Hartman, to the [facility]. Please see a copy of my visit note below.    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     Also with left PNT in place due to concerns for hydronephrosis in the setting of pyelonephritis. She received 3 cycles of adjuvant therapy with multiple hospitalizations for sepsis of urinary source, deconditioning, malnutrition prior to discontinuation of therapy.  She currently also has NG tube in for tube feeds.    She presents today in person with her daughter Joy who aids in history.  She is still at the care facility.  They both expressed strong desire for her to not be there.  Hannah in particular wants to go home.  Her daughter is slightly more hesitant about this but would prefer her to be somewhere besides her current facility.  Hannah is very bothered by her NG tube.  Is causing a lot of of nasal and throat discomfort.  Feels it is hard to breathe at nighttime especially.  She has been eating small amounts with the tube in place.  She wishes to return to her home or family home with home health services.  Also have questions about her nephrostomy tube.    Oncology History   Cervical cancer (H)   3/1996 Initial Diagnosis    Cervical cancer    Moderately differentiated squamous cell carcinoma. She was treated with primary radiation therapy, unsure if she also had chemotherapy or not.  This treatment was at [facility].     12/22/2014  Procedure    Exam under anesthesia with LASER to the vagina for VAIN 3     5/24/2019 Procedure    12/27/18:  Pap:  HSIL, HPV+  5/24/19: PROCEDURES: Vaginal colposcopy, vaginal biopsy, CO2 laser of the vagina  VAGINAL BIOPSY:   - High grade squamous intraepithelial lesion (vaginal intraepithelial neoplasia 3)      3/12/2020 Procedure    10/14/19: Pap HSIL/other HPV+  2/28/2020: biopsy of vagina Vain III; MRI recommended prior to OR; however, patient did not complete this  3/12/2020: EUA, biopsies, LASER: VAIN III     6/8/2021 Procedure    6/8/2021: Exam under anesthesia, vaginal biopsies, laser ablation of the upper vagina with Dr. Perez, biopsies returned showing necrosis, no dysplasia. Hyperbaric oxygen therapy discussed and referral place. Patient was unable to pursue this due to need for transportation to the [facility].     4/6/2023 Imaging    4/6/2023 CT Urogram: IMPRESSION:  1.  Negative CT urogram. No urinary calculi, solid renal mass, or evidence of upper tract urothelial malignancy.   2.  Enlarged uterus, with marked distention of the endometrial canal by intermediate density material, possibly a mix of fluid and blood products. This may be related to cervical stenosis given the history of prior pelvic radiation. Uterine enlargement may contribute to urinary frequency/urgency.   3.  Indeterminate left pelvic/perirectal mass with rim calcifications measuring up to 4.5 cm. Differentials include an old hematoma or an atypical enlarged lymph node. Consider pelvic MRI with contrast and subtraction imaging for further evaluation. The uterus and endometrium can also be further evaluated at that time.        Serous adenocarcinoma of uterus (H)    Initial Diagnosis    Uterine serous carcinoma     5/3/2024 Imaging    CT and ultrasound showing distended endometrial canal. No evidence of metastatic disease     5/17/2024 Surgery    D&C of the uterus with drainage of the uterine fluid under US guidance, lysis of  vaginal adhesions, cystoscopy     5/17/2024 Pathology    Endometrial curettings: Endometrial serous carcinoma with extensive necrosis     7/12/2024 Surgery    Diagnostic laparoscopy converted to exploratory laparotomy, total abdominal hysterectomy, bilateral salpingo-oophorectomy, lysis of adhesions, bladder repair and insertion of suprapubic catheter by Dr. Monroy     7/12/2024 Pathology    Final pathology with uterine serous carcinoma, MMR-intact/[id number]/HER2 negative       7/12/2024 -  Cancer Staged    [id number] uterine serous carcinoma, omentum negative, washings negative, lymph nodes omitted due to radiation history and high grade histology     8/8/2024 - 10/15/2024 Chemotherapy    8/8/24: Cycle 1 Carboplatin AUC 5, paclitaxel 175mg/ m2  8/30/24: Cycle 2 Carboplatin AUC 5, paclitaxel 175mg/m2  10/15/24: Cycle 3 Carboplatin AUC 5, paclitaxel 135mg/m2, reduced due to recent sepsis  Discontinued after 3 cycles due to recurrent admissions     9/20/2024 - 9/27/2024 Hospital Admission    Admission for sepsis secondary to urinary tract infection     11/7/2024 - 11/10/2024 Hospital Admission    ED to hospital admission for weakness, near syncope, and dyspnea.      11/26/2024 - 12/8/2024 Hospital Admission    ED to hospital admission for sepsis of urinary source required ICU admission. Concerns for ureteral obstruction and left PNT placement. Severe malnutrition and anasarca leading to NGT placement for tube feeds. [facility]charged to [facility] nursing facility.     12/8/2024 Recommended Treatment    Discontinuation of remainder of planned adjuvant therapy given life threatening complications with multiple admissions. Plan for surveillance.           Physical Exam:  There were no vitals taken for this visit.   Gen: seated in wheelchair, appears subdued, NGT in place.  Abd: left PNT, suprapubic catheter in place with yellow drainage with scant sediment.    Performance status: 3 (Capable of only limited selfcare; confined to bed  or chair more than 50% of waking hours)    Labs:  Reviewed 12/12/24 labs showing hypoalbuminemia    Assessment:  Aranza is a 69 year old with PMH notable for schizophrenia (off medications), h/o leandra-en-Y gastric bypass, cervical cancer treated with radiation therapy in 1996, more recently serous uterine cancer s/p SNEHA-BSO with bladder injury and ultimately suprapubic catheter due to pre-existing bladder dysfunction now with PNT due to hydronephrosis as well as NGT for feeding due to profound malnutrition here to discuss plan post-hospitalization.    Plan:  -Uterine serous carcinoma: Received 3 cycles of carboplatin and paclitaxel in the adjuvant setting.  Discontinuation of any remaining cycles due to life-threatening complications of sepsis requiring multiple hospitalizations as well as declining performance status and malnutrition.  We will plan for clinical follow-up with visit every 3 months and imaging for any concerns for recurrence.    -Bladder dysfunction with suprapubic catheter in place: Prior to cancer surgery the patient had significant urinary dysfunction likely secondary to history of radiation therapy. Thus at the time of hysterectomy with cystotomy decision was made to place suprapubic catheter due to her poor quality of life preoperatively.   -Challenges with adhering to schedule for catheter exchange which has contributed to infectious history.  Important to make sure the patient's daughter is aware of any scheduled exchange appointments.  We will make every attempt to coordinate these with other in clinic appointments given transportation concerns.  -It is my strong recommendation that this catheter be maintained permanently given both her history of severe bladder dysfunction prior to placement as well as concerns about ability to heal catheter track if removed in the setting of history of radiation therapy    Left percutaneous nephrostomy tube: Noted to have hydronephrosis on prior imaging.   Nephrostomy tube was placed during most recent episode of severe sepsis due to concern this may be contributing.  Although at that time there was not any culture sent from the kidney nor evidence of pyelonephrosis on imaging.  -Must balance the risks of additional external tube that will need exchange with benefits of this remaining in place.  I reach out to urology to discuss internalization of this versus stent placement through the bladder versus trial of PNT capping.    Severe protein calorie malnutrition with resultant anasarca: I think there is contribution both from complications of chemotherapy and recurrent hospitalizations as well as notable depression of mood since discontinuing some of her psych theatric medications earlier this fall.  She has a history of Kiko-en-Y so feeding tube placement is complicated by this.  Had NG tube placed during hospitalization; however, this is not ideal for long-term use.    - I discussed with the patient and her daughter today the option of exchange for a smaller caliber feeding tube versus percutaneous feeding tube.  She absolutely declines any percutaneous intervention.  She is also not amenable to replacement with a smaller caliber tube.  She only wants removal of the tube.  After discussion of the options we have agreed to leave this in place while she remains at the facility.  I extensively discussed my concerns about removal given the significant amount of calories she is receiving via tube feeding nutrition at this time and her previous inability to maintain her nutrition.  She is aware that she may require replacement in the future if she is not able to maintain her nutrition with oral intake.  -Chloraseptic throat spray for discomfort.  Plan to pull NG tube prior to leaving the facility see below.    Severe deconditioning: I continue to feel that she is best served at a facility given her extensive needs and recurrent hospitalization when she was in other living  situations.  However, we did discuss that this is ultimately her and her family's decision.  She and her daughter would like to try to get the patient home with home services.  Our nursing staff will reach out to social work/care coordination at her current facility to see what options might exist.  She has had home services in the recent past so perhaps would be able to resume some of these.  -Her performance status has decreased significantly in recent months.  I do think if she has additional hospitalizations we need to have a very rich conversation about long-term care planning for the patient with her and her family. I did start this conversation today to the extent possible.     Dany Perez MD   Gynecologic Oncology     I spent a total of 45 minutes on the care of Alis Hartman on the day of service including face to face time, care coordination, and documentation on the day of service.       Again, thank you for allowing me to participate in the care of your patient.        Sincerely,        Dany Perez MD

## 2024-12-17 NOTE — PROGRESS NOTES
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     Also with left PNT in place due to concerns for hydronephrosis in the setting of pyelonephritis. She received 3 cycles of adjuvant therapy with multiple hospitalizations for sepsis of urinary source, deconditioning, malnutrition prior to discontinuation of therapy.  She currently also has NG tube in for tube feeds.    She presents today in person with her daughter Joy who aids in history.  She is still at the care facility.  They both expressed strong desire for her to not be there.  Hannah in particular wants to go home.  Her daughter is slightly more hesitant about this but would prefer her to be somewhere besides her current facility.  Hannah is very bothered by her NG tube.  Is causing a lot of of nasal and throat discomfort.  Feels it is hard to breathe at nighttime especially.  She has been eating small amounts with the tube in place.  She wishes to return to her home or family home with home health services.  Also have questions about her nephrostomy tube.    Oncology History   Cervical cancer (H)   3/1996 Initial Diagnosis    Cervical cancer    Moderately differentiated squamous cell carcinoma. She was treated with primary radiation therapy, unsure if she also had chemotherapy or not.  This treatment was at [facility].     12/22/2014 Procedure    Exam under anesthesia with LASER to the vagina for VAIN 3     5/24/2019 Procedure    12/27/18:  Pap:  HSIL, HPV+  5/24/19: PROCEDURES: Vaginal colposcopy, vaginal biopsy, CO2 laser of the vagina  VAGINAL BIOPSY:   - High grade squamous intraepithelial lesion  (vaginal intraepithelial neoplasia 3)      3/12/2020 Procedure    10/14/19: Pap HSIL/other HPV+  2/28/2020: biopsy of vagina Vain III; MRI recommended prior to OR; however, patient did not complete this  3/12/2020: EUA, biopsies, LASER: VAIN III     6/8/2021 Procedure    6/8/2021: Exam under anesthesia, vaginal biopsies, laser ablation of the upper vagina with Dr. Perez, biopsies returned showing necrosis, no dysplasia. Hyperbaric oxygen therapy discussed and referral place. Patient was unable to pursue this due to need for transportation to the [facility].     4/6/2023 Imaging    4/6/2023 CT Urogram: IMPRESSION:  1.  Negative CT urogram. No urinary calculi, solid renal mass, or evidence of upper tract urothelial malignancy.   2.  Enlarged uterus, with marked distention of the endometrial canal by intermediate density material, possibly a mix of fluid and blood products. This may be related to cervical stenosis given the history of prior pelvic radiation. Uterine enlargement may contribute to urinary frequency/urgency.   3.  Indeterminate left pelvic/perirectal mass with rim calcifications measuring up to 4.5 cm. Differentials include an old hematoma or an atypical enlarged lymph node. Consider pelvic MRI with contrast and subtraction imaging for further evaluation. The uterus and endometrium can also be further evaluated at that time.        Serous adenocarcinoma of uterus (H)    Initial Diagnosis    Uterine serous carcinoma     5/3/2024 Imaging    CT and ultrasound showing distended endometrial canal. No evidence of metastatic disease     5/17/2024 Surgery    D&C of the uterus with drainage of the uterine fluid under US guidance, lysis of vaginal adhesions, cystoscopy     5/17/2024 Pathology    Endometrial curettings: Endometrial serous carcinoma with extensive necrosis     7/12/2024 Surgery    Diagnostic laparoscopy converted to exploratory laparotomy, total abdominal hysterectomy, bilateral  salpingo-oophorectomy, lysis of adhesions, bladder repair and insertion of suprapubic catheter by Dr. Monroy     7/12/2024 Pathology    Final pathology with uterine serous carcinoma, MMR-intact/g56-ybrlyiyi/HER2 negative       7/12/2024 -  Cancer Staged    T2NxM0 uterine serous carcinoma, omentum negative, washings negative, lymph nodes omitted due to radiation history and high grade histology     8/8/2024 - 10/15/2024 Chemotherapy    8/8/24: Cycle 1 Carboplatin AUC 5, paclitaxel 175mg/ m2  8/30/24: Cycle 2 Carboplatin AUC 5, paclitaxel 175mg/m2  10/15/24: Cycle 3 Carboplatin AUC 5, paclitaxel 135mg/m2, reduced due to recent sepsis  Discontinued after 3 cycles due to recurrent admissions     9/20/2024 - 9/27/2024 Hospital Admission    Admission for sepsis secondary to urinary tract infection     11/7/2024 - 11/10/2024 Hospital Admission    ED to hospital admission for weakness, near syncope, and dyspnea.      11/26/2024 - 12/8/2024 Hospital Admission    ED to hospital admission for sepsis of urinary source required ICU admission. Concerns for ureteral obstruction and left PNT placement. Severe malnutrition and anasarca leading to NGT placement for tube feeds. Discharged to skill nursing facility.     12/8/2024 Recommended Treatment    Discontinuation of remainder of planned adjuvant therapy given life threatening complications with multiple admissions. Plan for surveillance.           Physical Exam:  There were no vitals taken for this visit.   Gen: seated in wheelchair, appears subdued, NGT in place.  Abd: left PNT, suprapubic catheter in place with yellow drainage with scant sediment.    Performance status: 3 (Capable of only limited selfcare; confined to bed or chair more than 50% of waking hours)    Labs:  Reviewed 12/12/24 labs showing hypoalbuminemia    Assessment:  Aranza is a 69 year old with PMH notable for schizophrenia (off medications), h/o leandra-en-Y gastric bypass, cervical cancer treated with radiation  therapy in 1996, more recently serous uterine cancer s/p SNEHA-BSO with bladder injury and ultimately suprapubic catheter due to pre-existing bladder dysfunction now with PNT due to hydronephrosis as well as NGT for feeding due to profound malnutrition here to discuss plan post-hospitalization.    Plan:  -Uterine serous carcinoma: Received 3 cycles of carboplatin and paclitaxel in the adjuvant setting.  Discontinuation of any remaining cycles due to life-threatening complications of sepsis requiring multiple hospitalizations as well as declining performance status and malnutrition.  We will plan for clinical follow-up with visit every 3 months and imaging for any concerns for recurrence.    -Bladder dysfunction with suprapubic catheter in place: Prior to cancer surgery the patient had significant urinary dysfunction likely secondary to history of radiation therapy. Thus at the time of hysterectomy with cystotomy decision was made to place suprapubic catheter due to her poor quality of life preoperatively.   -Challenges with adhering to schedule for catheter exchange which has contributed to infectious history.  Important to make sure the patient's daughter is aware of any scheduled exchange appointments.  We will make every attempt to coordinate these with other in clinic appointments given transportation concerns.  -It is my strong recommendation that this catheter be maintained permanently given both her history of severe bladder dysfunction prior to placement as well as concerns about ability to heal catheter track if removed in the setting of history of radiation therapy    Left percutaneous nephrostomy tube: Noted to have hydronephrosis on prior imaging.  Nephrostomy tube was placed during most recent episode of severe sepsis due to concern this may be contributing.  Although at that time there was not any culture sent from the kidney nor evidence of pyelonephrosis on imaging.  -Must balance the risks of  additional external tube that will need exchange with benefits of this remaining in place.  I reach out to urology to discuss internalization of this versus stent placement through the bladder versus trial of PNT capping.    Severe protein calorie malnutrition with resultant anasarca: I think there is contribution both from complications of chemotherapy and recurrent hospitalizations as well as notable depression of mood since discontinuing some of her psych theatric medications earlier this fall.  She has a history of Kiko-en-Y so feeding tube placement is complicated by this.  Had NG tube placed during hospitalization; however, this is not ideal for long-term use.    - I discussed with the patient and her daughter today the option of exchange for a smaller caliber feeding tube versus percutaneous feeding tube.  She absolutely declines any percutaneous intervention.  She is also not amenable to replacement with a smaller caliber tube.  She only wants removal of the tube.  After discussion of the options we have agreed to leave this in place while she remains at the facility.  I extensively discussed my concerns about removal given the significant amount of calories she is receiving via tube feeding nutrition at this time and her previous inability to maintain her nutrition.  She is aware that she may require replacement in the future if she is not able to maintain her nutrition with oral intake.  -Chloraseptic throat spray for discomfort.  Plan to pull NG tube prior to leaving the facility see below.    Severe deconditioning: I continue to feel that she is best served at a facility given her extensive needs and recurrent hospitalization when she was in other living situations.  However, we did discuss that this is ultimately her and her family's decision.  She and her daughter would like to try to get the patient home with home services.  Our nursing staff will reach out to social work/care coordination at her  current facility to see what options might exist.  She has had home services in the recent past so perhaps would be able to resume some of these.  -Her performance status has decreased significantly in recent months.  I do think if she has additional hospitalizations we need to have a very rich conversation about long-term care planning for the patient with her and her family. I did start this conversation today to the extent possible.     Dany Perez MD   Gynecologic Oncology     I spent a total of 45 minutes on the care of Alis Hartman on the day of service including face to face time, care coordination, and documentation on the day of service.

## 2024-12-18 ENCOUNTER — PATIENT OUTREACH (OUTPATIENT)
Dept: ONCOLOGY | Facility: CLINIC | Age: 69
End: 2024-12-18
Payer: COMMERCIAL

## 2024-12-18 NOTE — PROGRESS NOTES
[facility]: Cancer Care Note                                                          Received request from Dr. Perez, to contact patient's team at her current TCU - either a  or Nurse  - to review updates from yesterday's Oncology visit, as well as to discuss what resources might be available to patient once she does return home.      Writer placed telephone call to patient's TCU this afternoon ([facility], phone: [phone]), but had to leave a message with their staff.  Will try reaching out again on next business day.      Nicholas Jarrell, RN, BSN, OCN  RN Care Coordinator - Oncology  [facility]

## 2024-12-19 ENCOUNTER — MYC MEDICAL ADVICE (OUTPATIENT)
Dept: INTERVENTIONAL RADIOLOGY/VASCULAR | Facility: CLINIC | Age: 69
End: 2024-12-19
Payer: COMMERCIAL

## 2024-12-19 NOTE — PROGRESS NOTES
[facility]: Cancer Care Note                                                          Writer was able to connect with patient's TCU , [facility], this morning.  Provided Zahida with updates from patient's Oncology clinic visit with Dr. Perez earlier this week.  Per Zahida, their team is already well aware that patient and her family are unhappy with their TCU - and they are aware that she wishes to return home.  However, per Zahida they have strongly discouraged patient from discharging home at this time, since she has been requiring 24 hour care in their facility.    Zahida states that once patient is well enough to discharge back home from their TCU, their team would take care of managing any home care referrals.  She states prior to the TCU admission, patient was receiving home care services including a PCA for 8 hours, a homemaker, and .  Zahida states she would also plan to request skilled nursing services for patient, as well as in-home PT and OT.  However, Zahida states if patient were to leave the TCU AMA - they would not be able to help her arrange these services (it would either be up to the patient, or up to our clinic to help arrange these).      Zahida also shared that patient had tried going without the NG/feeding tube for awhile, but she lost weight rapidly - so it was re-inserted.  She states her team would agree that patient needs to continue with the tube feedings at this point.    Note with these updates was sent to Dr. Perez this morning.  Writer provided Zahida with our direct phone number, and encouraged her to reach out with any further updates or concerns for us.    Nicholas Jarrell, RN, BSN, OCN  RN Care Coordinator - Oncology  [facility]

## 2024-12-23 NOTE — TELEPHONE ENCOUNTER
Voicemail message left at patient contact with request for patient to contact Interventional Radiology Department and acknowledge understanding of MYCHART instructions that were sent in anticipation of procedure scheduled 12/30.      IR contact number provided in [facility].    I have also spoken with Court, staff member at [facility] and per her request, faxed pre-procedure instructions to [phone].    Yolanda GROVER  Interventional Radiology RN

## 2024-12-30 ENCOUNTER — PATIENT OUTREACH (OUTPATIENT)
Dept: ONCOLOGY | Facility: CLINIC | Age: 69
End: 2024-12-30
Payer: COMMERCIAL

## 2024-12-30 ENCOUNTER — APPOINTMENT (OUTPATIENT)
Dept: INTERVENTIONAL RADIOLOGY/VASCULAR | Facility: CLINIC | Age: 69
End: 2024-12-30
Attending: RADIOLOGY
Payer: COMMERCIAL

## 2024-12-30 ENCOUNTER — HOSPITAL ENCOUNTER (OUTPATIENT)
Facility: CLINIC | Age: 69
Discharge: NURSING, REHAB OR LONG-TERM CARE | End: 2024-12-30
Attending: RADIOLOGY | Admitting: PHYSICIAN ASSISTANT
Payer: COMMERCIAL

## 2024-12-30 ENCOUNTER — MEDICAL CORRESPONDENCE (OUTPATIENT)
Dept: HEALTH INFORMATION MANAGEMENT | Facility: CLINIC | Age: 69
End: 2024-12-30

## 2024-12-30 ENCOUNTER — TELEPHONE (OUTPATIENT)
Dept: NURSING | Facility: CLINIC | Age: 69
End: 2024-12-30

## 2024-12-30 VITALS
RESPIRATION RATE: 18 BRPM | BODY MASS INDEX: 25.87 KG/M2 | HEART RATE: 98 BPM | TEMPERATURE: 97.8 F | HEIGHT: 63 IN | DIASTOLIC BLOOD PRESSURE: 73 MMHG | OXYGEN SATURATION: 99 % | WEIGHT: 146 LBS | SYSTOLIC BLOOD PRESSURE: 120 MMHG

## 2024-12-30 DIAGNOSIS — N13.39 OTHER HYDRONEPHROSIS: ICD-10-CM

## 2024-12-30 DIAGNOSIS — Z93.6 NEPHROSTOMY STATUS (H): Primary | ICD-10-CM

## 2024-12-30 PROCEDURE — 258N000003 HC RX IP 258 OP 636: Performed by: NURSE PRACTITIONER

## 2024-12-30 PROCEDURE — 999N000127 HC STATISTIC PERIPHERAL IV START W US GUIDANCE

## 2024-12-30 PROCEDURE — 250N000011 HC RX IP 250 OP 636: Performed by: NURSE PRACTITIONER

## 2024-12-30 PROCEDURE — 50431 NJX PX NFROSGRM &/URTRGRM: CPT

## 2024-12-30 PROCEDURE — 255N000002 HC RX 255 OP 636

## 2024-12-30 RX ORDER — NALOXONE HYDROCHLORIDE 0.4 MG/ML
0.2 INJECTION, SOLUTION INTRAMUSCULAR; INTRAVENOUS; SUBCUTANEOUS
Status: DISCONTINUED | OUTPATIENT
Start: 2024-12-30 | End: 2024-12-30 | Stop reason: HOSPADM

## 2024-12-30 RX ORDER — FLUMAZENIL 0.1 MG/ML
0.2 INJECTION, SOLUTION INTRAVENOUS
Status: DISCONTINUED | OUTPATIENT
Start: 2024-12-30 | End: 2024-12-30 | Stop reason: HOSPADM

## 2024-12-30 RX ORDER — NALOXONE HYDROCHLORIDE 0.4 MG/ML
0.4 INJECTION, SOLUTION INTRAMUSCULAR; INTRAVENOUS; SUBCUTANEOUS
Status: DISCONTINUED | OUTPATIENT
Start: 2024-12-30 | End: 2024-12-30 | Stop reason: HOSPADM

## 2024-12-30 RX ORDER — LIDOCAINE 40 MG/G
CREAM TOPICAL
Status: DISCONTINUED | OUTPATIENT
Start: 2024-12-30 | End: 2024-12-30 | Stop reason: HOSPADM

## 2024-12-30 RX ORDER — FENTANYL CITRATE 50 UG/ML
25-50 INJECTION, SOLUTION INTRAMUSCULAR; INTRAVENOUS EVERY 5 MIN PRN
Status: DISCONTINUED | OUTPATIENT
Start: 2024-12-30 | End: 2024-12-30 | Stop reason: HOSPADM

## 2024-12-30 RX ORDER — IODIXANOL 320 MG/ML
50 INJECTION, SOLUTION INTRAVASCULAR ONCE
Status: COMPLETED | OUTPATIENT
Start: 2024-12-30 | End: 2024-12-30

## 2024-12-30 RX ORDER — AMPICILLIN 1 G/1
1 INJECTION, POWDER, FOR SOLUTION INTRAMUSCULAR; INTRAVENOUS
Status: COMPLETED | OUTPATIENT
Start: 2024-12-30 | End: 2024-12-30

## 2024-12-30 RX ADMIN — AMPICILLIN SODIUM 1 G: 1 INJECTION, POWDER, FOR SOLUTION INTRAMUSCULAR; INTRAVENOUS at 13:24

## 2024-12-30 RX ADMIN — IODIXANOL 10 ML: 320 INJECTION, SOLUTION INTRAVASCULAR at 13:56

## 2024-12-30 RX ADMIN — GENTAMICIN SULFATE 150 MG: 40 INJECTION, SOLUTION INTRAMUSCULAR; INTRAVENOUS at 13:38

## 2024-12-30 ASSESSMENT — ACTIVITIES OF DAILY LIVING (ADL)
ADLS_ACUITY_SCORE: 65

## 2024-12-30 NOTE — PROCEDURES
Alis Hartman  [id number]    Completed antegrade nephrostogram through LEFT PNT.    Contrast injected and seen flowing to urinary bladder. PNT capped per plan.     Patient to return in 1 week for serum creatinine check and possible PNT removal.    Dx: nephrostomy status. Denise.  <1

## 2024-12-30 NOTE — PROGRESS NOTES
Patient arrives to 2A in room one. Patient arrives alone from TCU. Patient's VSS, AOx4. Prep completed and consent signed. Will continue to assess patient until taken for procedure.

## 2024-12-30 NOTE — PROGRESS NOTES
Gynecologic Oncology Telephone Note  Called IR regarding patient coming for procedure and desire to pull NGT. They will page me when she arrives to 2A for prep for procedure. I will go over and pull the tube while she is there ideally before the procedure so it's out of the way for any mask ventilation, etc. Also decreases risks of aspiration if removed prior to procedure. It is just an NGT so don't anticipate this to be challenging. Will need discontinue order for tube feeds at facility.     Dany Perez MD

## 2024-12-30 NOTE — PROGRESS NOTES
[facility]: Cancer Care Note                                                          Telephone call was placed to daughter, Joy, providing update and plan per Dr. Perez.      Nicholas Jarrell, RN, BSN, OCN  RN Care Coordinator - Oncology  [facility]

## 2024-12-30 NOTE — DISCHARGE INSTRUCTIONS
[facility]  Department of Vascular and Interventional Radiology (IR)   Drain Placement Discharge Instructions    You had a percutaneous nephrostomy tube (PNT) /Percutaneous Nephroureteral Catheter, Ureterostomy, tube placed or exchanged.      If you received sedation:     For 24 hours:   Have an adult stay with you for 24 hours.   Drink plenty of fluids and resume your regular diet.  Do not drive or operate machinery at home or at work  No alcoholic beverages  Do not make any important legal decisions  No heavy lifting greater than 10 lb unless otherwise instructed by your physician.    Slowly advance toward your normal activities after two days    Bathing  We recommend sponge baths.  If you must shower, please secure the catheter, connecting tubes and collection device into plastic bag around the body with adhesive taping.   Be sure to keep the catheter site as dry as possible.        Components of Drainage Catheter System          How to  Care for Your Catheter  Inspect the catheter dressing and change the dressing if it is soiled, loose or wet.  Inspect the catheter for any signs of kinking.    Do not irrigate catheter.      Contact Information  Please contact IR for the following:    -Fever >=101F and/or shaking chills  -worsening redness, warmth and pus drainage at/or around the site of catheter placement  -Inability to flush the catheter  -Significant leakage around the catheter site  -Catheter pulled out or falls out  -New or increasing pain that does not respond to over the counter pain medication  -Drainage bag sent with patient in case patient needs to be reconnected per IR instructitons      The Interventional Radiology Nursing line is available at [phone] Monday to Friday (7:30AM-4:00PM).   Any voicemails left will be returned within 24 hours.      For emergencies (that cannot wait until normal business hours) during night time hours and weekends,  please call [facility]  at [phone] and ask to page [facility] on call Interventional Radiology team.      Follow-Up  Your next appointment has been scheduled for 1/7/2025 at 10:30 am .  If it is not, please call Interventional Radiology Scheduling Line at [phone] to schedule follow-up.      What to bring at your next IR appointment  List of current medications    [facility] (Dressings and other medical supplies):  [email or web address] (for various locations)  [phone]

## 2024-12-30 NOTE — IR NOTE
Patient Name: Alis Hartman  Medical Record Number: [id number]  Today's Date: 12/30/2024    Procedure: Left nephrostomy tube check  Proceduralist: Clarke Walker PA-C  Pathology present: no    Procedure Start: 1342  Procedure end: 1349  Sedation medications administered: n/a, local lidocaine     Report given to: RICKEY Morse RN      Other Notes: Pt arrived to IR room 2 from . Consent reviewed. Pt denies any questions or concerns regarding procedure. Pt positioned prone and monitored per protocol. Pt tolerated procedure without any noted complications. Pt transferred back to .

## 2024-12-30 NOTE — PROGRESS NOTES
Patient tolerated recovery stage well. VSS, left flank site clean/dry/intact, no hematoma, and denies pain at site. Patient tolerated PO food and fluids. Discharge instructions were sent with patient back to TCU. Patient assisted to wheelchair with assist of two. Patient discharged from the hospital via wheelchair back to [facility] TCU via [facility] transportation ride.

## 2024-12-30 NOTE — PROGRESS NOTES
Gyn Onc Attending Note   I saw Aranza in pre-procedure prior to IR procedure. NG [feeding] tube was easily removed bedside. Updated paperwork for her facility to discontinue her tube feeds and encourage intake of 1-3 Ensure supplements a day.     Dany Perez MD  Gynecologic Oncology

## 2024-12-30 NOTE — PROGRESS NOTES
[facility]: Cancer Care Note                                                          Received voicemail message this morning from patient's daughter, Joy, calling regarding patient's IR appointment that is scheduled for later this morning (arrival time of 11:00 am).  Daughter requested callback to confirm that patient's feeding tube will be removed at this appointment, as they don't want to run into any problems when they arrive.  Per daughter, the feeding tube has continued to be very bothersome for patient.    Writer reviewed patient's chart.  Noted patient's IR appointment for today is scheduled for nephrostomy procedure only (nephrostomy check with possible capping trial).  Also reviewed Dr. Perez's notes from patient's office visit on 12/17/24, which stated:    I discussed with the patient and her daughter today the option of exchange for a smaller caliber feeding tube versus percutaneous feeding tube.  She absolutely declines any percutaneous intervention.  She is also not amenable to replacement with a smaller caliber tube.  She only wants removal of the tube.  After discussion of the options we have agreed to leave this in place while she remains at the facility.  I extensively discussed my concerns about removal given the significant amount of calories she is receiving via tube feeding nutrition at this time and her previous inability to maintain her nutrition.  She is aware that she may require replacement in the future if she is not able to maintain her nutrition with oral intake.  -Chloraseptic throat spray for discomfort.  Plan to pull NG tube prior to leaving the facility see below.     Writer placed callback to patient's daughter this morning to discuss.  Daughter shares update that patient is still currently at the TCU, but they are hoping to bring her home soon.  Daughter plans to call the TCU  this week to touch base.  Daughter is aware that the recommendation  "would be for patient's previous home care services to resume once she is back home - as well as skilled nursing services, PT and OT.      Per daughter, patient is requesting to have the feeding tube removed completely.  She is not interested in having a smaller tube placed or PEG tube.  Daughter states the tube continues to be very bothersome for patient - it is causing her a lot of pain in her throat and face (and near her ear), despite using the Chloraseptic spray.  As a result of this, patient has not been able to get good sleep or rest for a long time - daughter states \"she needs a break.\"      Daughter states patient has been doing better with eating and drinking by mouth, her weight has been remaining stable - and they would like to trial removal of the tube.  Per daughter, they would be willing to have the feeding tube replaced (or discuss other possible options) if patient doesn't end up doing well after having the tube removed.     Reviewed with daughter that since the IR appointment for today is for patient's nephrostomy tube check, unsure if that team would be able to do anything with the feeding tube.  However, will send a message to Dr. Perez to advise if she would be agreeable with patient's request to have the tube removed at this time - and if so, we could possible arrange to have that done either at patient's TCU, or in clinic.      Advised daughter we will let her know what Dr. Perez recommends, and she verbalized understanding.    Nicholas Jarrell, RN, BSN, OCN  RN Care Coordinator - Oncology  [facility]    "

## 2024-12-30 NOTE — PRE-PROCEDURE
GENERAL PRE-PROCEDURE:   Procedure:  Left Antegrade Nephrostogram with possible capping trial, exchange, or removal.  Date/Time:  12/30/2024 1:03 PM    Verbal consent obtained?: Yes    Written consent obtained?: Yes    Risks and benefits: Risks, benefits and alternatives were discussed    DC Plan: Appropriate discharge home plan in place for patients who are going home after procedure   Consent given by:  Patient  Patient states understanding of procedure being performed: Yes    Patient's understanding of procedure matches consent: Yes    Procedure consent matches procedure scheduled: Yes    Expected level of sedation:  Moderate  Appropriately NPO:  Yes  ASA Class:  2  Mallampati  :  Grade 2- soft palate, base of uvula, tonsillar pillars, and portion of posterior pharyngeal wall visible  Lungs:  Lungs clear with good breath sounds bilaterally  Heart:  Normal heart sounds and rate  History & Physical reviewed:  History and physical reviewed and no updates needed  Statement of review:  I have reviewed the lab findings, diagnostic data, medications, and the plan for sedation

## 2024-12-30 NOTE — PROGRESS NOTES
Pt arrived back from neph tube check. VSS on RA, denies pain. Left flank site with old dressing in place, intact. Pt tube is capped and to remain capped for a capping trial. RN instructed pt to open to a drainage bag if there is any pain at the site and will send pt to TCU with a drainage bag. Tolerating PO intake.

## 2024-12-31 NOTE — TELEPHONE ENCOUNTER
Nurse Russ calling from [facility] in [place] regarding the patient's PNT tube. Reports it was capped and the patient is refusing to use it. Requesting to know if this was the plan. Reviewed notes from interventional radiology today which advised the PNT was capped per plan with patient to return in 1 week for serum creatinine check and possible PNT removal. Russ expressed understanding with no further questions.     Marcia Miller RN 12/30/24 10:59 PM   [facility] Triage Nurse Advisor

## 2025-01-01 ENCOUNTER — APPOINTMENT (OUTPATIENT)
Dept: OCCUPATIONAL THERAPY | Facility: CLINIC | Age: 70
DRG: 696 | End: 2025-01-01

## 2025-01-01 ENCOUNTER — APPOINTMENT (OUTPATIENT)
Dept: PHYSICAL THERAPY | Facility: CLINIC | Age: 70
DRG: 696 | End: 2025-01-01

## 2025-01-01 ENCOUNTER — APPOINTMENT (OUTPATIENT)
Dept: GENERAL RADIOLOGY | Facility: CLINIC | Age: 70
End: 2025-01-01
Payer: MEDICAID

## 2025-01-01 PROCEDURE — 71045 X-RAY EXAM CHEST 1 VIEW: CPT | Mod: 26 | Performed by: RADIOLOGY

## 2025-01-01 PROCEDURE — 999N000065 XR CHEST PORT 1 VIEW

## 2025-01-02 ENCOUNTER — PRE VISIT (OUTPATIENT)
Dept: UROLOGY | Facility: CLINIC | Age: 70
End: 2025-01-02
Payer: COMMERCIAL

## 2025-01-02 ENCOUNTER — TELEPHONE (OUTPATIENT)
Dept: UROLOGY | Facility: CLINIC | Age: 70
End: 2025-01-02
Payer: COMMERCIAL

## 2025-01-02 NOTE — TELEPHONE ENCOUNTER
Left Voicemail (1st Attempt) for the patient to call back and schedule the following:    Appointment type: Return Patient  Provider:   Return date:   Specialty phone number: [phone]  Additional appointment(s) needed:   Per Adrian Ny MD: Needs f/u with Dr. Monroy in 3-4 weeks post- discharge. Patient needs to reschedule for VV after 11am or reschedule for the next available appointment in person.

## 2025-01-02 NOTE — TELEPHONE ENCOUNTER
Reason for visit: consult for      Dx/Hx/Sx: 3-4 week follow up    Records/imaging/labs/orders: in epic     At Rooming: virtual visit

## 2025-01-03 ENCOUNTER — TELEPHONE (OUTPATIENT)
Dept: UROLOGY | Facility: CLINIC | Age: 70
End: 2025-01-03
Payer: COMMERCIAL

## 2025-01-03 NOTE — TELEPHONE ENCOUNTER
[facility]    Phone Message    May a detailed message be left on voicemail: no     Reason for Call: Other: Patient called to get a catheter change however writer does not see that she has had these appointments in this past and it sounds like she is over due. Please review and call back patient ot schedule.      Action Taken: Message routed to:  [facility] Urology    Travel Screening: Not Applicable     Date of Service:

## 2025-01-06 ENCOUNTER — TELEPHONE (OUTPATIENT)
Dept: INTERVENTIONAL RADIOLOGY/VASCULAR | Facility: CLINIC | Age: 70
End: 2025-01-06
Payer: COMMERCIAL

## 2025-01-06 NOTE — TELEPHONE ENCOUNTER
Patient confirmed scheduled appointment:  Date: 01/15/2025  Time: 1:30pom  Visit type: Return Patient  Provider:   Location: VV

## 2025-01-06 NOTE — TELEPHONE ENCOUNTER
2nd attempt to reach team at [facility] TCU to assure they are aware of plan for patient in IR on 1/7/25.   unable to reach HUC or Nursing staff.  She has taken IR contact info and confirms that she will have staff contact IR staff to confirm POC for 1/7/25 in IR.    Yolanda GROVER  Interventional Radiology RN

## 2025-01-07 ENCOUNTER — APPOINTMENT (OUTPATIENT)
Dept: MEDSURG UNIT | Facility: CLINIC | Age: 70
End: 2025-01-07
Attending: STUDENT IN AN ORGANIZED HEALTH CARE EDUCATION/TRAINING PROGRAM
Payer: MEDICARE

## 2025-01-07 ENCOUNTER — APPOINTMENT (OUTPATIENT)
Dept: INTERVENTIONAL RADIOLOGY/VASCULAR | Facility: CLINIC | Age: 70
End: 2025-01-07
Attending: PHYSICIAN ASSISTANT
Payer: COMMERCIAL

## 2025-01-07 ENCOUNTER — HOSPITAL ENCOUNTER (OUTPATIENT)
Facility: CLINIC | Age: 70
Discharge: NURSING, REHAB OR LONG-TERM CARE | End: 2025-01-07
Attending: STUDENT IN AN ORGANIZED HEALTH CARE EDUCATION/TRAINING PROGRAM | Admitting: STUDENT IN AN ORGANIZED HEALTH CARE EDUCATION/TRAINING PROGRAM
Payer: COMMERCIAL

## 2025-01-07 VITALS
RESPIRATION RATE: 16 BRPM | TEMPERATURE: 98.2 F | BODY MASS INDEX: 27.63 KG/M2 | DIASTOLIC BLOOD PRESSURE: 89 MMHG | WEIGHT: 156 LBS | HEART RATE: 82 BPM | OXYGEN SATURATION: 97 % | SYSTOLIC BLOOD PRESSURE: 151 MMHG

## 2025-01-07 DIAGNOSIS — Z93.6 NEPHROSTOMY STATUS (H): ICD-10-CM

## 2025-01-07 LAB
CREAT SERPL-MCNC: 0.83 MG/DL (ref 0.51–0.95)
EGFRCR SERPLBLD CKD-EPI 2021: 75 ML/MIN/1.73M2

## 2025-01-07 PROCEDURE — 999N000142 HC STATISTIC PROCEDURE PREP ONLY

## 2025-01-07 PROCEDURE — 82565 ASSAY OF CREATININE: CPT | Performed by: NURSE PRACTITIONER

## 2025-01-07 PROCEDURE — 36591 DRAW BLOOD OFF VENOUS DEVICE: CPT | Performed by: NURSE PRACTITIONER

## 2025-01-07 PROCEDURE — 999N000248 HC STATISTIC IV INSERT WITH US BY RN

## 2025-01-07 PROCEDURE — 99207 IR FOLLOW UP VISIT OUTPATIENT: CPT | Mod: GC | Performed by: STUDENT IN AN ORGANIZED HEALTH CARE EDUCATION/TRAINING PROGRAM

## 2025-01-07 PROCEDURE — 250N000011 HC RX IP 250 OP 636: Performed by: NURSE PRACTITIONER

## 2025-01-07 PROCEDURE — G0463 HOSPITAL OUTPT CLINIC VISIT: HCPCS

## 2025-01-07 RX ORDER — AMPICILLIN 1 G/1
1 INJECTION, POWDER, FOR SOLUTION INTRAMUSCULAR; INTRAVENOUS
Status: COMPLETED | OUTPATIENT
Start: 2025-01-07 | End: 2025-01-07

## 2025-01-07 RX ORDER — LIDOCAINE 40 MG/G
CREAM TOPICAL
Status: DISCONTINUED | OUTPATIENT
Start: 2025-01-07 | End: 2025-01-07 | Stop reason: HOSPADM

## 2025-01-07 RX ORDER — GENTAMICIN SULFATE 100 MG/100ML
1.7 INJECTION, SOLUTION INTRAVENOUS
Status: COMPLETED | OUTPATIENT
Start: 2025-01-07 | End: 2025-01-07

## 2025-01-07 RX ADMIN — GENTAMICIN SULFATE 100 MG: 100 INJECTION, SOLUTION INTRAVENOUS at 11:49

## 2025-01-07 RX ADMIN — AMPICILLIN SODIUM 1 G: 1 INJECTION, POWDER, FOR SOLUTION INTRAMUSCULAR; INTRAVENOUS at 11:26

## 2025-01-07 ASSESSMENT — ACTIVITIES OF DAILY LIVING (ADL)
ADLS_ACUITY_SCORE: 65

## 2025-01-07 NOTE — PROGRESS NOTES
Another 2a RN attempted to contact [facility] to obtain information about transportation service for pt back to TCU. Per this RN, [employer] (who is listed on pt's TCU information packet) is not aware of this pt. The RN left voice mail message with Rossy at [facility] regarding update of transportation service. No call back received.     This RN called [facility] asking to speak with anyone available regarding transportation service update. Rosaura at [facility] able to contact [facility] regarding updated transportation service with [facility]. This RN contacted [facility] transport service [phone] regarding currently  location of pt on [address]. Pt stable.

## 2025-01-07 NOTE — PROGRESS NOTES
Pt arrived to [address] for left nephrostogram in IR. AFVSS. Denies pain. Lab processing. This RN called [facility] TCU in  [place] to review current medication and review when last doses were taken. TCU paperwork accompanying pt was printed yesterday and printed information was cut off on most sheets and unclear about medication information. This RN spoke with Kael CRAMER. Pt's daughter, Joy, on her way to be with pt at bedside. Pt stable.

## 2025-01-07 NOTE — PROCEDURES
[facility]    Procedure: IR Procedure Note    Date/Time: 1/7/2025 1:09 PM    Performed by: Rebecca Tatum DO  Authorized by: Rebecca Tatum DO  IR Fellow Physician:    Pre Procedure Diagnosis: Hydronephrosis  Post Procedure Diagnosis: Same    UNIVERSAL PROTOCOL   Site Marked: NA  Prior Images Obtained and Reviewed:  Yes  Required items: Required blood products, implants, devices and special equipment available    Patient identity confirmed:  Arm band, provided demographic data, hospital-assigned identification number and verbally with patient  Patient was reevaluated immediately before administering moderate or deep sedation or anesthesia  Confirmation Checklist:  Correct equipment/implants were available, procedure was appropriate and matched the consent or emergent situation, relevant allergies and patient's identity using two indicators  Time out: Immediately prior to the procedure a time out was called    Universal Protocol: the Joint Commission Universal Protocol was followed    Preparation: Patient was prepped and draped in usual sterile fashion    ESBL (mL):  0     ANESTHESIA    Anesthesia:  Local infiltration  Local Anesthetic:  Lidocaine 1% without epinephrine      SEDATION    Patient Sedated: No    See dictated procedure note for full details.  Findings: Left PNT capped since 12/30. Patient has been asymptomatic with tube capped. Cr 0.83. Left PNT cut and removed. Dressing placed. No immediate complications.     Specimens: none    Procedural Complications: None    Condition: Stable    Plan: -Instructed patient on dressing changes and return precautions.       PROCEDURE    Patient Tolerance:  Patient tolerated the procedure well with no immediate complications  Length of time physician/provider present for 1:1 monitoring during sedation:  0 min

## 2025-01-07 NOTE — DISCHARGE INSTRUCTIONS
[facility]      Interventional Radiology  Patient Instructions Following Left Percutaneous Nephrostomy Capped Tube Removal    AFTER YOU GO HOME:  Resume previous diet and medication  Limit strenuous physical activity such as lifting (>10 lbs.), straining, or exercising for 48 hours.  You may resume normal activity in 24 hours  Change gauze at the puncture site as needed to keep site dry.  Leaking of additional fluid is not uncommon during the first 24-48 ho  CALL THE PHYSICIAN:  If you develop a fever, severe abdominal pain or excessive bleeding from the paracentesis site within 24 hours of the procedure  If you experience severe lightheadedness or fainting, call you doctor immediately or come to the closest Emergency Department.  Do not drive yourself.  If you have questions or concerns regarding your procedure call the Radiologist  If you have difficulty urinating or blood urine      [facility] INTERVENTIONAL RADIOLOGY DEPARTMENT  Procedure Physician: Dr Rebecca Tatum         Date of procedure: 1/7/2025    Telephone Numbers: [phone]      Monday-Friday 7:30 am to 4:00 pm  [phone] After 4:00 pm Monday-Friday, Weekends & Holidays.   Ask the  to contact the Interventional Radiologist on call.  Someone is on call 24 hrs/day    [facility] toll free number: [phone] Monday-Friday 8:00 am to 4:30 pm  [facility] Emergency Dept: [phone]

## 2025-01-14 ENCOUNTER — INFUSION THERAPY VISIT (OUTPATIENT)
Dept: INFUSION THERAPY | Facility: CLINIC | Age: 70
End: 2025-01-14
Attending: OBSTETRICS & GYNECOLOGY
Payer: COMMERCIAL

## 2025-01-14 ENCOUNTER — ONCOLOGY VISIT (OUTPATIENT)
Dept: ONCOLOGY | Facility: CLINIC | Age: 70
End: 2025-01-14
Attending: OBSTETRICS & GYNECOLOGY
Payer: COMMERCIAL

## 2025-01-14 VITALS
BODY MASS INDEX: 27.63 KG/M2 | HEART RATE: 93 BPM | WEIGHT: 156 LBS | OXYGEN SATURATION: 100 % | RESPIRATION RATE: 16 BRPM | SYSTOLIC BLOOD PRESSURE: 99 MMHG | DIASTOLIC BLOOD PRESSURE: 73 MMHG

## 2025-01-14 DIAGNOSIS — C55 SEROUS ADENOCARCINOMA OF UTERUS (H): Primary | ICD-10-CM

## 2025-01-14 DIAGNOSIS — N17.9 AKI (ACUTE KIDNEY INJURY): ICD-10-CM

## 2025-01-14 DIAGNOSIS — R10.84 ABDOMINAL PAIN, GENERALIZED: ICD-10-CM

## 2025-01-14 DIAGNOSIS — C55 SEROUS ADENOCARCINOMA OF UTERUS (H): ICD-10-CM

## 2025-01-14 DIAGNOSIS — N39.0 COMPLICATED URINARY TRACT INFECTION: ICD-10-CM

## 2025-01-14 DIAGNOSIS — E87.1 HYPONATREMIA: ICD-10-CM

## 2025-01-14 LAB
ALBUMIN SERPL BCG-MCNC: 2 G/DL (ref 3.5–5.2)
ALP SERPL-CCNC: 107 U/L (ref 40–150)
ALT SERPL W P-5'-P-CCNC: 15 U/L (ref 0–50)
ANION GAP SERPL CALCULATED.3IONS-SCNC: 9 MMOL/L (ref 7–15)
AST SERPL W P-5'-P-CCNC: 44 U/L (ref 0–45)
BASOPHILS # BLD AUTO: 0 10E3/UL (ref 0–0.2)
BASOPHILS NFR BLD AUTO: 0 %
BILIRUB SERPL-MCNC: 0.3 MG/DL
BUN SERPL-MCNC: 10.5 MG/DL (ref 8–23)
CALCIUM SERPL-MCNC: 7.4 MG/DL (ref 8.8–10.4)
CHLORIDE SERPL-SCNC: 101 MMOL/L (ref 98–107)
CREAT SERPL-MCNC: 0.85 MG/DL (ref 0.51–0.95)
EGFRCR SERPLBLD CKD-EPI 2021: 73 ML/MIN/1.73M2
EOSINOPHIL # BLD AUTO: 0 10E3/UL (ref 0–0.7)
EOSINOPHIL NFR BLD AUTO: 0 %
ERYTHROCYTE [DISTWIDTH] IN BLOOD BY AUTOMATED COUNT: 15.3 % (ref 10–15)
GLUCOSE SERPL-MCNC: 92 MG/DL (ref 70–99)
HCO3 SERPL-SCNC: 24 MMOL/L (ref 22–29)
HCT VFR BLD AUTO: 28.7 % (ref 35–47)
HGB BLD-MCNC: 9.4 G/DL (ref 11.7–15.7)
IMM GRANULOCYTES # BLD: 0 10E3/UL
IMM GRANULOCYTES NFR BLD: 1 %
LYMPHOCYTES # BLD AUTO: 2.5 10E3/UL (ref 0.8–5.3)
LYMPHOCYTES NFR BLD AUTO: 46 %
MCH RBC QN AUTO: 29.1 PG (ref 26.5–33)
MCHC RBC AUTO-ENTMCNC: 32.8 G/DL (ref 31.5–36.5)
MCV RBC AUTO: 89 FL (ref 78–100)
MONOCYTES # BLD AUTO: 0.4 10E3/UL (ref 0–1.3)
MONOCYTES NFR BLD AUTO: 7 %
NEUTROPHILS # BLD AUTO: 2.4 10E3/UL (ref 1.6–8.3)
NEUTROPHILS NFR BLD AUTO: 46 %
NRBC # BLD AUTO: 0 10E3/UL
NRBC BLD AUTO-RTO: 0 /100
PLATELET # BLD AUTO: 173 10E3/UL (ref 150–450)
POTASSIUM SERPL-SCNC: 3.2 MMOL/L (ref 3.4–5.3)
PREALB SERPL-MCNC: <3 MG/DL (ref 20–40)
PROT SERPL-MCNC: 4.7 G/DL (ref 6.4–8.3)
RBC # BLD AUTO: 3.23 10E6/UL (ref 3.8–5.2)
SODIUM SERPL-SCNC: 134 MMOL/L (ref 135–145)
WBC # BLD AUTO: 5.4 10E3/UL (ref 4–11)

## 2025-01-14 PROCEDURE — G0463 HOSPITAL OUTPT CLINIC VISIT: HCPCS | Performed by: OBSTETRICS & GYNECOLOGY

## 2025-01-14 PROCEDURE — 99215 OFFICE O/P EST HI 40 MIN: CPT | Performed by: OBSTETRICS & GYNECOLOGY

## 2025-01-14 PROCEDURE — 36591 DRAW BLOOD OFF VENOUS DEVICE: CPT

## 2025-01-14 PROCEDURE — 250N000011 HC RX IP 250 OP 636: Performed by: OBSTETRICS & GYNECOLOGY

## 2025-01-14 PROCEDURE — 85025 COMPLETE CBC W/AUTO DIFF WBC: CPT

## 2025-01-14 PROCEDURE — 84134 ASSAY OF PREALBUMIN: CPT

## 2025-01-14 PROCEDURE — 80053 COMPREHEN METABOLIC PANEL: CPT

## 2025-01-14 RX ORDER — OXYCODONE HYDROCHLORIDE 5 MG/1
5 TABLET ORAL EVERY 8 HOURS
Qty: 60 TABLET | Refills: 0 | Status: SHIPPED | OUTPATIENT
Start: 2025-01-14

## 2025-01-14 RX ORDER — NITROFURANTOIN 25; 75 MG/1; MG/1
100 CAPSULE ORAL 2 TIMES DAILY
Qty: 14 CAPSULE | Refills: 0 | Status: SHIPPED | OUTPATIENT
Start: 2025-01-14

## 2025-01-14 RX ORDER — HEPARIN SODIUM (PORCINE) LOCK FLUSH IV SOLN 100 UNIT/ML 100 UNIT/ML
500 SOLUTION INTRAVENOUS ONCE
Status: COMPLETED | OUTPATIENT
Start: 2025-01-14 | End: 2025-01-14

## 2025-01-14 RX ADMIN — Medication 500 UNITS: at 12:36

## 2025-01-14 ASSESSMENT — PAIN SCALES - GENERAL: PAINLEVEL_OUTOF10: EXTREME PAIN (9)

## 2025-01-14 NOTE — PROGRESS NOTES
See IV flow sheet for details of port access. Labs sent: cbc, cmp, pre-albumin. Port flushed per protocol and needle removed without difficulty.    Veronica Payton RN

## 2025-01-14 NOTE — PROGRESS NOTES
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     Complicated history see prior notes. Presents with her daughter who aids in history. Remains at TCU. Hoping for discharge soon. They have several concerns about care and communication. Concerned she should have had antibiotic for UTI and didn't get this. Culture was mixed elisa on my review. PCN was removed. Only has suprapubic in place now. Eating well since NGT/feeding tube removal. Has poor pain control throughout the abdomen. Diffuse. Different from prior to surgery but has been present for awhile now and oxycodone 5mg is not enough. Still having challenges with mobility. Still has sacral wounds per patient.    Oncology History   Cervical cancer (H)   3/1996 Initial Diagnosis    Cervical cancer    Moderately differentiated squamous cell carcinoma. She was treated with primary radiation therapy, unsure if she also had chemotherapy or not.  This treatment was at [facility].     12/22/2014 Procedure    Exam under anesthesia with LASER to the vagina for VAIN 3     5/24/2019 Procedure    12/27/18:  Pap:  HSIL, HPV+  5/24/19: PROCEDURES: Vaginal colposcopy, vaginal biopsy, CO2 laser of the vagina  VAGINAL BIOPSY:   - High grade squamous intraepithelial lesion (vaginal intraepithelial neoplasia 3)      3/12/2020 Procedure    10/14/19: Pap HSIL/other HPV+  2/28/2020: biopsy of vagina Vain III; MRI recommended prior to OR; however, patient did not complete this  3/12/2020: EUA, biopsies, LASER: VAIN III     6/8/2021 Procedure    6/8/2021: Exam under anesthesia, vaginal biopsies, laser ablation of the upper vagina with   O'Olmedo, biopsies returned showing necrosis, no dysplasia. Hyperbaric oxygen therapy discussed and referral place. Patient was unable to pursue this due to need for transportation to the [facility].     4/6/2023 Imaging    4/6/2023 CT Urogram: IMPRESSION:  1.  Negative CT urogram. No urinary calculi, solid renal mass, or evidence of upper tract urothelial malignancy.   2.  Enlarged uterus, with marked distention of the endometrial canal by intermediate density material, possibly a mix of fluid and blood products. This may be related to cervical stenosis given the history of prior pelvic radiation. Uterine enlargement may contribute to urinary frequency/urgency.   3.  Indeterminate left pelvic/perirectal mass with rim calcifications measuring up to 4.5 cm. Differentials include an old hematoma or an atypical enlarged lymph node. Consider pelvic MRI with contrast and subtraction imaging for further evaluation. The uterus and endometrium can also be further evaluated at that time.        Serous adenocarcinoma of uterus (H)    Initial Diagnosis    Uterine serous carcinoma     5/3/2024 Imaging    CT and ultrasound showing distended endometrial canal. No evidence of metastatic disease     5/17/2024 Surgery    D&C of the uterus with drainage of the uterine fluid under US guidance, lysis of vaginal adhesions, cystoscopy     5/17/2024 Pathology    Endometrial curettings: Endometrial serous carcinoma with extensive necrosis     7/12/2024 Surgery    Diagnostic laparoscopy converted to exploratory laparotomy, total abdominal hysterectomy, bilateral salpingo-oophorectomy, lysis of adhesions, bladder repair and insertion of suprapubic catheter by Dr. Monroy     7/12/2024 Pathology    Final pathology with uterine serous carcinoma, MMR-intact/[id number]/HER2 negative       7/12/2024 -  Cancer Staged    T2NxM0 uterine serous carcinoma, omentum negative, washings negative, lymph nodes omitted due to radiation history and high  grade histology     8/8/2024 - 10/15/2024 Chemotherapy    8/8/24: Cycle 1 Carboplatin AUC 5, paclitaxel 175mg/ m2  8/30/24: Cycle 2 Carboplatin AUC 5, paclitaxel 175mg/m2  10/15/24: Cycle 3 Carboplatin AUC 5, paclitaxel 135mg/m2, reduced due to recent sepsis  Discontinued after 3 cycles due to recurrent admissions     9/20/2024 - 9/27/2024 Hospital Admission    Admission for sepsis secondary to urinary tract infection     11/7/2024 - 11/10/2024 Hospital Admission    ED to hospital admission for weakness, near syncope, and dyspnea.      11/26/2024 - 12/8/2024 Hospital Admission    ED to hospital admission for sepsis of urinary source required ICU admission. Concerns for ureteral obstruction and left PNT placement. Severe malnutrition and anasarca leading to NGT placement for tube feeds. [facility] to [facility].     12/8/2024 Recommended Treatment    Discontinuation of remainder of planned adjuvant therapy given life threatening complications with multiple admissions. Plan for surveillance.           Physical Exam:  BP 99/73   Pulse 93   Resp 16   Wt 70.8 kg (156 lb)   SpO2 100%   BMI 27.63 kg/m     Gen: seated in wheelchair, appears subdued, NGT in place.  Abd: left PNT, suprapubic catheter in place with yellow drainage with scant sediment.    Performance status: 3 (Capable of only limited selfcare; confined to bed or chair more than 50% of waking hours)    Labs:  Reviewed 12/12/24 labs showing hypoalbuminemia    Assessment:  Aranza is a 69 year old with PMH notable for schizophrenia (off medications), h/o leandra-en-Y gastric bypass, cervical cancer treated with radiation therapy in 1996, more recently serous uterine cancer s/p SNEHA-BSO with cystotomy and suprapubic catheter due to pre-existing bladder dysfunction here to discuss plan.     Plan:  -Uterine serous carcinoma: Received 3 cycles of carboplatin and paclitaxel in the adjuvant setting.  Discontinuation of any remaining cycles due to  life-threatening complications of sepsis requiring multiple hospitalizations as well as declining performance status and malnutrition.  We will plan for clinical follow-up with visit every 3 months and imaging for any concerns for recurrence.  - Return in 3 mos with CT prior  - Will have patient see [facility] about 1 mos from TCU discharge to review how she is doing.     -Chronic abdominal pain: unclear etiology however she has history of radiation therapy and malignancy with suprapubic catheter in place so given her challenges with appointments and complicated history I am amenable to prescribing her opioid therapy. We discussed possibly seeing palliative care to establish care for symptom management and possible discussion of longer acting therapy if this becomes a chronic need versus non-opioid options.     -Deconditioning, malnutrition, mobility concerns: will discharge with outpatient services ideally. Recommend close follow-up as she is high risk for return to hospital.      -Bladder dysfunction with suprapubic catheter in place: Prior to cancer surgery the patient had significant urinary dysfunction likely secondary to history of radiation therapy. Thus at the time of hysterectomy with cystotomy decision was made to place suprapubic catheter due to her poor quality of life preoperatively.   -Challenges with adhering to schedule for catheter exchange which has contributed to infectious history.  Important to make sure the patient's daughter is aware of any scheduled exchange appointments.  We will make every attempt to coordinate these with other in clinic appointments given transportation concerns.  -It is my strong recommendation that this catheter be maintained permanently given both her history of severe bladder dysfunction prior to placement as well as concerns about ability to heal catheter track if removed in the setting of history of radiation therapy      Dany Perez MD   Gynecologic Oncology     I  spent a total of 45 minutes on the care of [facility] on the day of service including face to face time, care coordination, and documentation on the day of service.

## 2025-01-14 NOTE — NURSING NOTE
"Oncology Rooming Note    January 14, 2025 12:50 PM   Alis Hartman is a 69 year old female who presents for:    Chief Complaint   Patient presents with    Oncology Clinic Visit     Post-chemo follow up     Initial Vitals: BP 99/73   Pulse 93   Resp 16   Wt 70.8 kg (156 lb)   SpO2 100%   BMI 27.63 kg/m   Estimated body mass index is 27.63 kg/m  as calculated from the following:    Height as of 12/30/24: 1.6 m (5' 3\").    Weight as of this encounter: 70.8 kg (156 lb). Body surface area is 1.77 meters squared.  Extreme Pain (9) Comment: Data Unavailable   No LMP recorded. Patient is postmenopausal.  Allergies reviewed: Yes  Medications reviewed: Yes    Medications: Medication refills not needed today.  Pharmacy name entered into [employer]:    [id number] - [place], [place] - [address] [facility]  [id number] [place] [address] [id number] - [place] [address] [facility] [address] [facility] [employer] - [place], [place] - [address] [facility] [place], [place] - [address] [address] [place] [address], [id number]  [address]    Frailty Screening:   Is the patient here for a new oncology consult visit in cancer care? 2. No      Clinical concerns: pain at abdomen       Mabel Landaverde LPN              "

## 2025-01-14 NOTE — LETTER
1/14/2025      Alis Hartman  [address]  [place] [address]      Dear Colleague,    Thank you for referring your patient, Alis Hartman, to the [facility]. Please see a copy of my visit note below.    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     Complicated history see prior notes. Presents with her daughter who aids in history. Remains at TCU. Hoping for discharge soon. They have several concerns about care and communication. Concerned she should have had antibiotic for UTI and didn't get this. Culture was mixed elisa on my review. PCN was removed. Only has suprapubic in place now. Eating well since NGT/feeding tube removal. Has poor pain control throughout the abdomen. Diffuse. Different from prior to surgery but has been present for awhile now and oxycodone 5mg is not enough. Still having challenges with mobility. Still has sacral wounds per patient.    Oncology History   Cervical cancer (H)   3/1996 Initial Diagnosis    Cervical cancer    Moderately differentiated squamous cell carcinoma. She was treated with primary radiation therapy, unsure if she also had chemotherapy or not.  This treatment was at [facility].     12/22/2014 Procedure    Exam under anesthesia with LASER to the vagina for VAIN 3     5/24/2019 Procedure    12/27/18:  Pap:  HSIL, HPV+  5/24/19: PROCEDURES: Vaginal colposcopy, vaginal biopsy, CO2 laser of the vagina  VAGINAL BIOPSY:   - High grade squamous intraepithelial lesion (vaginal intraepithelial neoplasia 3)      3/12/2020 Procedure    10/14/19: Pap HSIL/other  HPV+  2/28/2020: biopsy of vagina Vain III; MRI recommended prior to OR; however, patient did not complete this  3/12/2020: EUA, biopsies, LASER: VAIN III     6/8/2021 Procedure    6/8/2021: Exam under anesthesia, vaginal biopsies, laser ablation of the upper vagina with Dr. Perez, biopsies returned showing necrosis, no dysplasia. Hyperbaric oxygen therapy discussed and referral place. Patient was unable to pursue this due to need for transportation to the [facility].     4/6/2023 Imaging    4/6/2023 CT Urogram: IMPRESSION:  1.  Negative CT urogram. No urinary calculi, solid renal mass, or evidence of upper tract urothelial malignancy.   2.  Enlarged uterus, with marked distention of the endometrial canal by intermediate density material, possibly a mix of fluid and blood products. This may be related to cervical stenosis given the history of prior pelvic radiation. Uterine enlargement may contribute to urinary frequency/urgency.   3.  Indeterminate left pelvic/perirectal mass with rim calcifications measuring up to 4.5 cm. Differentials include an old hematoma or an atypical enlarged lymph node. Consider pelvic MRI with contrast and subtraction imaging for further evaluation. The uterus and endometrium can also be further evaluated at that time.        Serous adenocarcinoma of uterus (H)    Initial Diagnosis    Uterine serous carcinoma     5/3/2024 Imaging    CT and ultrasound showing distended endometrial canal. No evidence of metastatic disease     5/17/2024 Surgery    D&C of the uterus with drainage of the uterine fluid under US guidance, lysis of vaginal adhesions, cystoscopy     5/17/2024 Pathology    Endometrial curettings: Endometrial serous carcinoma with extensive necrosis     7/12/2024 Surgery    Diagnostic laparoscopy converted to exploratory laparotomy, total abdominal hysterectomy, bilateral salpingo-oophorectomy, lysis of adhesions, bladder repair and insertion of suprapubic catheter by   Eliana     7/12/2024 Pathology    Final pathology with uterine serous carcinoma, MMR-intact/b83-sjgziozx/HER2 negative       7/12/2024 -  Cancer Staged    T2NxM0 uterine serous carcinoma, omentum negative, washings negative, lymph nodes omitted due to radiation history and high grade histology     8/8/2024 - 10/15/2024 Chemotherapy    8/8/24: Cycle 1 Carboplatin AUC 5, paclitaxel 175mg/ m2  8/30/24: Cycle 2 Carboplatin AUC 5, paclitaxel 175mg/m2  10/15/24: Cycle 3 Carboplatin AUC 5, paclitaxel 135mg/m2, reduced due to recent sepsis  Discontinued after 3 cycles due to recurrent admissions     9/20/2024 - 9/27/2024 Hospital Admission    Admission for sepsis secondary to urinary tract infection     11/7/2024 - 11/10/2024 Hospital Admission    ED to hospital admission for weakness, near syncope, and dyspnea.      11/26/2024 - 12/8/2024 Hospital Admission    ED to hospital admission for sepsis of urinary source required ICU admission. Concerns for ureteral obstruction and left PNT placement. Severe malnutrition and anasarca leading to NGT placement for tube feeds. [facility]charged to [facility] facility.     12/8/2024 Recommended Treatment    Discontinuation of remainder of planned adjuvant therapy given life threatening complications with multiple admissions. Plan for surveillance.           Physical Exam:  BP 99/73   Pulse 93   Resp 16   Wt 70.8 kg (156 lb)   SpO2 100%   BMI 27.63 kg/m     Gen: seated in wheelchair, appears subdued, NGT in place.  Abd: left PNT, suprapubic catheter in place with yellow drainage with scant sediment.    Performance status: 3 (Capable of only limited selfcare; confined to bed or chair more than 50% of waking hours)    Labs:  Reviewed 12/12/24 labs showing hypoalbuminemia    Assessment:  Aranza is a 69 year old with PMH notable for schizophrenia (off medications), h/o leandra-en-Y gastric bypass, cervical cancer treated with radiation therapy in 1996, more recently serous uterine cancer s/p  SNEHA-BSO with cystotomy and suprapubic catheter due to pre-existing bladder dysfunction here to discuss plan.     Plan:  -Uterine serous carcinoma: Received 3 cycles of carboplatin and paclitaxel in the adjuvant setting.  Discontinuation of any remaining cycles due to life-threatening complications of sepsis requiring multiple hospitalizations as well as declining performance status and malnutrition.  We will plan for clinical follow-up with visit every 3 months and imaging for any concerns for recurrence.  - Return in 3 mos with CT prior  - Will have patient see THAO about 1 mos from TCU discharge to review how she is doing.     -Chronic abdominal pain: unclear etiology however she has history of radiation therapy and malignancy with suprapubic catheter in place so given her challenges with appointments and complicated history I am amenable to prescribing her opioid therapy. We discussed possibly seeing palliative care to establish care for symptom management and possible discussion of longer acting therapy if this becomes a chronic need versus non-opioid options.     -Deconditioning, malnutrition, mobility concerns: will discharge with outpatient services ideally. Recommend close follow-up as she is high risk for return to hospital.      -Bladder dysfunction with suprapubic catheter in place: Prior to cancer surgery the patient had significant urinary dysfunction likely secondary to history of radiation therapy. Thus at the time of hysterectomy with cystotomy decision was made to place suprapubic catheter due to her poor quality of life preoperatively.   -Challenges with adhering to schedule for catheter exchange which has contributed to infectious history.  Important to make sure the patient's daughter is aware of any scheduled exchange appointments.  We will make every attempt to coordinate these with other in clinic appointments given transportation concerns.  -It is my strong recommendation that this catheter be  maintained permanently given both her history of severe bladder dysfunction prior to placement as well as concerns about ability to heal catheter track if removed in the setting of history of radiation therapy      Dany Perez MD   Gynecologic Oncology     I spent a total of 45 minutes on the care of Alis Hartman on the day of service including face to face time, care coordination, and documentation on the day of service.       Again, thank you for allowing me to participate in the care of your patient.        Sincerely,        Dany Perez MD    Electronically signed

## 2025-01-16 NOTE — PROGRESS NOTES
Spoke with daughter  Was in TCU they transferred her to hospital  Hemoglobin 7  Wants to move to another TCU, does not like the care,losing weight  Putting a drain in kidney    Explained to daughter right hospital will manage care once ready for discharge you can speak with them about a different TCU  Reports understanding

## 2025-01-22 ENCOUNTER — PRE VISIT (OUTPATIENT)
Dept: UROLOGY | Facility: CLINIC | Age: 70
End: 2025-01-22
Payer: COMMERCIAL

## 2025-01-22 NOTE — TELEPHONE ENCOUNTER
Reason for visit: return pt ?  --possible surgery (SPT) discussion per Joann Batres last telephone encounter --      Scheduled on 1/17/25     Dx/Hx/Sx: Injury of bladder, sequela     Records/imaging/labs/orders: in epic     At Rooming: standard

## 2025-01-25 ENCOUNTER — APPOINTMENT (OUTPATIENT)
Dept: GENERAL RADIOLOGY | Facility: CLINIC | Age: 70
DRG: 696 | End: 2025-01-25
Attending: PHYSICIAN ASSISTANT
Payer: COMMERCIAL

## 2025-01-25 ENCOUNTER — HOSPITAL ENCOUNTER (INPATIENT)
Facility: CLINIC | Age: 70
LOS: 2 days | Discharge: HOME HEALTH | DRG: 696 | End: 2025-01-27
Attending: EMERGENCY MEDICINE | Admitting: STUDENT IN AN ORGANIZED HEALTH CARE EDUCATION/TRAINING PROGRAM
Payer: COMMERCIAL

## 2025-01-25 DIAGNOSIS — L89.152 PRESSURE INJURY OF SACRAL REGION, STAGE 2 (H): Primary | ICD-10-CM

## 2025-01-25 DIAGNOSIS — Z93.59 SUPRAPUBIC CATHETER (H): ICD-10-CM

## 2025-01-25 DIAGNOSIS — N39.0 UTI (URINARY TRACT INFECTION): ICD-10-CM

## 2025-01-25 DIAGNOSIS — Z86.19 HISTORY OF ESBL E. COLI INFECTION: ICD-10-CM

## 2025-01-25 LAB
ALBUMIN SERPL BCG-MCNC: 2.3 G/DL (ref 3.5–5.2)
ALBUMIN UR-MCNC: 300 MG/DL
ALP SERPL-CCNC: 129 U/L (ref 40–150)
ALT SERPL W P-5'-P-CCNC: 14 U/L (ref 0–50)
ANION GAP SERPL CALCULATED.3IONS-SCNC: 10 MMOL/L (ref 7–15)
APPEARANCE UR: ABNORMAL
AST SERPL W P-5'-P-CCNC: 19 U/L (ref 0–45)
BASOPHILS # BLD AUTO: 0 10E3/UL (ref 0–0.2)
BASOPHILS NFR BLD AUTO: 0 %
BILIRUB SERPL-MCNC: 0.7 MG/DL
BILIRUB UR QL STRIP: NEGATIVE
BUN SERPL-MCNC: 11.3 MG/DL (ref 8–23)
CALCIUM SERPL-MCNC: 7.7 MG/DL (ref 8.8–10.4)
CHLORIDE SERPL-SCNC: 101 MMOL/L (ref 98–107)
COLOR UR AUTO: ABNORMAL
CREAT SERPL-MCNC: 0.98 MG/DL (ref 0.51–0.95)
CYSTATIN C (ROCHE): 1.6 MG/L (ref 0.6–1)
EGFRCR SERPLBLD CKD-EPI 2021: 61 ML/MIN/1.73M2
EOSINOPHIL # BLD AUTO: 0 10E3/UL (ref 0–0.7)
EOSINOPHIL NFR BLD AUTO: 0 %
ERYTHROCYTE [DISTWIDTH] IN BLOOD BY AUTOMATED COUNT: 16.3 % (ref 10–15)
FLUAV RNA SPEC QL NAA+PROBE: NEGATIVE
FLUBV RNA RESP QL NAA+PROBE: NEGATIVE
GFR/BSA.PRED SERPLBLD CYS-BASED-ARV: 37 ML/MIN/1.73M2
GLUCOSE SERPL-MCNC: 117 MG/DL (ref 70–99)
GLUCOSE UR STRIP-MCNC: 30 MG/DL
HCO3 SERPL-SCNC: 23 MMOL/L (ref 22–29)
HCT VFR BLD AUTO: 30.5 % (ref 35–47)
HGB BLD-MCNC: 10.2 G/DL (ref 11.7–15.7)
HGB UR QL STRIP: ABNORMAL
HOLD SPECIMEN: NORMAL
IMM GRANULOCYTES # BLD: 0 10E3/UL
IMM GRANULOCYTES NFR BLD: 0 %
KETONES UR STRIP-MCNC: NEGATIVE MG/DL
LACTATE SERPL-SCNC: 1.6 MMOL/L (ref 0.7–2)
LEUKOCYTE ESTERASE UR QL STRIP: ABNORMAL
LYMPHOCYTES # BLD AUTO: 1.1 10E3/UL (ref 0.8–5.3)
LYMPHOCYTES NFR BLD AUTO: 11 %
MAGNESIUM SERPL-MCNC: 1.7 MG/DL (ref 1.7–2.3)
MCH RBC QN AUTO: 29.1 PG (ref 26.5–33)
MCHC RBC AUTO-ENTMCNC: 33.4 G/DL (ref 31.5–36.5)
MCV RBC AUTO: 87 FL (ref 78–100)
MONOCYTES # BLD AUTO: 0.8 10E3/UL (ref 0–1.3)
MONOCYTES NFR BLD AUTO: 8 %
NEUTROPHILS # BLD AUTO: 8.2 10E3/UL (ref 1.6–8.3)
NEUTROPHILS NFR BLD AUTO: 81 %
NITRATE UR QL: NEGATIVE
NRBC # BLD AUTO: 0 10E3/UL
NRBC BLD AUTO-RTO: 0 /100
PH UR STRIP: 7 [PH] (ref 5–7)
PHOSPHATE SERPL-MCNC: 2.4 MG/DL (ref 2.5–4.5)
PLATELET # BLD AUTO: 282 10E3/UL (ref 150–450)
POTASSIUM SERPL-SCNC: 3.2 MMOL/L (ref 3.4–5.3)
PROT SERPL-MCNC: 5.3 G/DL (ref 6.4–8.3)
RBC # BLD AUTO: 3.51 10E6/UL (ref 3.8–5.2)
RBC URINE: >182 /HPF
RSV RNA SPEC NAA+PROBE: NEGATIVE
SARS-COV-2 RNA RESP QL NAA+PROBE: NEGATIVE
SODIUM SERPL-SCNC: 134 MMOL/L (ref 135–145)
SP GR UR STRIP: 1.01 (ref 1–1.03)
UROBILINOGEN UR STRIP-MCNC: NORMAL MG/DL
WBC # BLD AUTO: 10.2 10E3/UL (ref 4–11)
WBC CLUMPS #/AREA URNS HPF: PRESENT /HPF
WBC URINE: >182 /HPF

## 2025-01-25 PROCEDURE — 83735 ASSAY OF MAGNESIUM: CPT

## 2025-01-25 PROCEDURE — 85004 AUTOMATED DIFF WBC COUNT: CPT | Performed by: PHYSICIAN ASSISTANT

## 2025-01-25 PROCEDURE — 87040 BLOOD CULTURE FOR BACTERIA: CPT | Performed by: PHYSICIAN ASSISTANT

## 2025-01-25 PROCEDURE — 250N000013 HC RX MED GY IP 250 OP 250 PS 637

## 2025-01-25 PROCEDURE — 250N000011 HC RX IP 250 OP 636: Mod: JZ

## 2025-01-25 PROCEDURE — 83605 ASSAY OF LACTIC ACID: CPT | Performed by: PHYSICIAN ASSISTANT

## 2025-01-25 PROCEDURE — 36415 COLL VENOUS BLD VENIPUNCTURE: CPT | Performed by: PHYSICIAN ASSISTANT

## 2025-01-25 PROCEDURE — 71045 X-RAY EXAM CHEST 1 VIEW: CPT

## 2025-01-25 PROCEDURE — 99222 1ST HOSP IP/OBS MODERATE 55: CPT | Mod: AI | Performed by: STUDENT IN AN ORGANIZED HEALTH CARE EDUCATION/TRAINING PROGRAM

## 2025-01-25 PROCEDURE — 120N000002 HC R&B MED SURG/OB UMMC

## 2025-01-25 PROCEDURE — 82040 ASSAY OF SERUM ALBUMIN: CPT | Performed by: PHYSICIAN ASSISTANT

## 2025-01-25 PROCEDURE — 84100 ASSAY OF PHOSPHORUS: CPT

## 2025-01-25 PROCEDURE — 82610 CYSTATIN C: CPT

## 2025-01-25 PROCEDURE — 84132 ASSAY OF SERUM POTASSIUM: CPT | Performed by: PHYSICIAN ASSISTANT

## 2025-01-25 PROCEDURE — 81001 URINALYSIS AUTO W/SCOPE: CPT | Performed by: PHYSICIAN ASSISTANT

## 2025-01-25 PROCEDURE — 87637 SARSCOV2&INF A&B&RSV AMP PRB: CPT | Performed by: PHYSICIAN ASSISTANT

## 2025-01-25 PROCEDURE — 71045 X-RAY EXAM CHEST 1 VIEW: CPT | Mod: 26 | Performed by: RADIOLOGY

## 2025-01-25 PROCEDURE — 250N000013 HC RX MED GY IP 250 OP 250 PS 637: Performed by: PHYSICIAN ASSISTANT

## 2025-01-25 PROCEDURE — 87186 SC STD MICRODIL/AGAR DIL: CPT | Performed by: PHYSICIAN ASSISTANT

## 2025-01-25 RX ORDER — ERTAPENEM 1 G/1
1 INJECTION, POWDER, LYOPHILIZED, FOR SOLUTION INTRAMUSCULAR; INTRAVENOUS ONCE
Status: COMPLETED | OUTPATIENT
Start: 2025-01-25 | End: 2025-01-26

## 2025-01-25 RX ORDER — CALCIUM CARBONATE 500 MG/1
1000 TABLET, CHEWABLE ORAL 4 TIMES DAILY PRN
Status: DISCONTINUED | OUTPATIENT
Start: 2025-01-25 | End: 2025-01-28 | Stop reason: HOSPADM

## 2025-01-25 RX ORDER — POTASSIUM CHLORIDE 20MEQ/15ML
20 LIQUID (ML) ORAL ONCE
Status: COMPLETED | OUTPATIENT
Start: 2025-01-25 | End: 2025-01-25

## 2025-01-25 RX ORDER — AMOXICILLIN 250 MG
1 CAPSULE ORAL 2 TIMES DAILY
Status: DISCONTINUED | OUTPATIENT
Start: 2025-01-25 | End: 2025-01-28 | Stop reason: HOSPADM

## 2025-01-25 RX ORDER — AMOXICILLIN 250 MG
1 CAPSULE ORAL 2 TIMES DAILY PRN
Status: DISCONTINUED | OUTPATIENT
Start: 2025-01-25 | End: 2025-01-28 | Stop reason: HOSPADM

## 2025-01-25 RX ORDER — ENOXAPARIN SODIUM 100 MG/ML
40 INJECTION SUBCUTANEOUS EVERY 24 HOURS
Status: DISCONTINUED | OUTPATIENT
Start: 2025-01-25 | End: 2025-01-25

## 2025-01-25 RX ORDER — ONDANSETRON 2 MG/ML
4 INJECTION INTRAMUSCULAR; INTRAVENOUS EVERY 6 HOURS PRN
Status: DISCONTINUED | OUTPATIENT
Start: 2025-01-25 | End: 2025-01-28 | Stop reason: HOSPADM

## 2025-01-25 RX ORDER — OXYCODONE HYDROCHLORIDE 5 MG/1
5 TABLET ORAL EVERY 8 HOURS
Status: DISCONTINUED | OUTPATIENT
Start: 2025-01-25 | End: 2025-01-28 | Stop reason: HOSPADM

## 2025-01-25 RX ORDER — ACETAMINOPHEN 325 MG/1
650 TABLET ORAL EVERY 6 HOURS PRN
Status: DISCONTINUED | OUTPATIENT
Start: 2025-01-25 | End: 2025-01-28 | Stop reason: HOSPADM

## 2025-01-25 RX ORDER — AMOXICILLIN 250 MG
2 CAPSULE ORAL 2 TIMES DAILY PRN
Status: DISCONTINUED | OUTPATIENT
Start: 2025-01-25 | End: 2025-01-28 | Stop reason: HOSPADM

## 2025-01-25 RX ORDER — PROCHLORPERAZINE MALEATE 5 MG/1
5-10 TABLET ORAL EVERY 6 HOURS PRN
Status: DISCONTINUED | OUTPATIENT
Start: 2025-01-25 | End: 2025-01-28 | Stop reason: HOSPADM

## 2025-01-25 RX ORDER — ALBUTEROL SULFATE 90 UG/1
1-2 INHALANT RESPIRATORY (INHALATION) EVERY 4 HOURS PRN
Status: DISCONTINUED | OUTPATIENT
Start: 2025-01-25 | End: 2025-01-28 | Stop reason: HOSPADM

## 2025-01-25 RX ORDER — POLYETHYLENE GLYCOL 3350 17 G/17G
17 POWDER, FOR SOLUTION ORAL DAILY PRN
Status: DISCONTINUED | OUTPATIENT
Start: 2025-01-25 | End: 2025-01-26

## 2025-01-25 RX ORDER — ONDANSETRON 4 MG/1
4 TABLET, ORALLY DISINTEGRATING ORAL EVERY 6 HOURS PRN
Status: DISCONTINUED | OUTPATIENT
Start: 2025-01-25 | End: 2025-01-28 | Stop reason: HOSPADM

## 2025-01-25 RX ORDER — LIDOCAINE 40 MG/G
CREAM TOPICAL
Status: DISCONTINUED | OUTPATIENT
Start: 2025-01-25 | End: 2025-01-28 | Stop reason: HOSPADM

## 2025-01-25 RX ADMIN — SENNOSIDES AND DOCUSATE SODIUM 1 TABLET: 50; 8.6 TABLET ORAL at 21:24

## 2025-01-25 RX ADMIN — Medication 3 MG: at 21:23

## 2025-01-25 RX ADMIN — ERTAPENEM SODIUM 1 G: 1 INJECTION, POWDER, LYOPHILIZED, FOR SOLUTION INTRAMUSCULAR; INTRAVENOUS at 22:05

## 2025-01-25 RX ADMIN — POTASSIUM CHLORIDE 20 MEQ: 20 SOLUTION ORAL at 15:12

## 2025-01-25 RX ADMIN — APIXABAN 5 MG: 5 TABLET, FILM COATED ORAL at 21:24

## 2025-01-25 RX ADMIN — SODIUM PHOSPHATE, MONOBASIC, MONOHYDRATE AND SODIUM PHOSPHATE, DIBASIC, ANHYDROUS 3 MMOL: 276; 142 INJECTION, SOLUTION INTRAVENOUS at 21:24

## 2025-01-25 RX ADMIN — OXYCODONE HYDROCHLORIDE 5 MG: 5 TABLET ORAL at 23:32

## 2025-01-25 RX ADMIN — ACETAMINOPHEN 650 MG: 325 TABLET, FILM COATED ORAL at 21:23

## 2025-01-25 ASSESSMENT — ACTIVITIES OF DAILY LIVING (ADL)
ADLS_ACUITY_SCORE: 65
ADLS_ACUITY_SCORE: 69
ADLS_ACUITY_SCORE: 65

## 2025-01-25 ASSESSMENT — COLUMBIA-SUICIDE SEVERITY RATING SCALE - C-SSRS
1. IN THE PAST MONTH, HAVE YOU WISHED YOU WERE DEAD OR WISHED YOU COULD GO TO SLEEP AND NOT WAKE UP?: NO
2. HAVE YOU ACTUALLY HAD ANY THOUGHTS OF KILLING YOURSELF IN THE PAST MONTH?: NO
6. HAVE YOU EVER DONE ANYTHING, STARTED TO DO ANYTHING, OR PREPARED TO DO ANYTHING TO END YOUR LIFE?: NO

## 2025-01-25 NOTE — H&P
Resident/Fellow Attestation   I, ARIAS ERICKSON MD, was present with the medical/THAO student who participated in the service and in the documentation of the note.  I have verified the history and personally performed the physical exam and medical decision making.  I agree with the assessment and plan of care as documented in the note.        ARIAS ERICKSON MD  PGY3  Date of Service (when I saw the patient): 01/25/25    [facility]    History and Physical - Medicine Service, MAROON TEAM 2       Date of Admission:  1/25/2025    Assessment & Plan      Alis Hartman is a 69 year old female admitted on 1/25/2025, with history of serous endometrial adenocarcinoma of uterus, s/p total abdominal hysterectomy and bilateral oophorectomy, s/p 3rd cycle of carbo/taxel, cystostomy s/p suprapubic catheter, and history of ESBL UTI with urosepsis and bacteremia.     #UTI  #Hx of ESBL UTI (Klebsiella pneumoniae)  #Hx of cystostomy s/p suprapubic catheter  #Urosepsis  Pt has previous hx of ESBL urosepsis treated with IV antibiotics, requiring nephrostomy tube, most recently admitted from 11/26-12/30, discharged with suprapubic catheter. Catheter was changed on 1/7/25 by IR, and set to have changed again on 1/15, but this appointment was missed. In ED, negative respiratory panel, normal CXR. UA today with hematuria, positive leuk esterase, increased RBCs, WBCs.   Plan:  -Ertapenem IV 1g   -BMP am  -Urine culture 1/25-  -Blood cultures ordered  -ID consult given ESBL status  -will consider imaging if HD worsening, or develops fever    #Hypophosphatemia  #Hypokalemia  -Received 3mM NaPhosphate 1/25  -Repeat Phosphate levels am    #Hx CKD 3a  Cystatin C of 1.6, Cystatin calculated GFR of 37. Appears to be at baseline  - BMP in the am    #Hx afib on eliquis  -Continue PTA eliquis    #Deconditioning  -PT/OT consult    #Chronic lower extremity edema, bilateral  Hx of  "chronic edema, Not on diuresis  -Lymphedema consult    #Anemia of chronic disease, improving  Hgb appears to be stable or improving over the past few months.   - nutrition support    #Hx Cholelithiasis, asymptomatic  Shown on December 2024 CT abdomen pelvis.    #Chronic pain  -PRN oxycodone    #Hx leandra-en-y bypass  Has needed NG tube in the past for malnutrition.   -CTM         Diet:  Regular diet  DVT Prophylaxis: DOAC  Shahid Catheter: Not present  Fluids: Received 20mEQ Kcl in ED.   Lines: PRESENT      Port a Cath 11/07/24 Single Lumen Right Chest wall-Site Assessment: WDL      Cardiac Monitoring: None  Code Status: Full Code      Clinically Significant Risk Factors Present on Admission        # Hypokalemia: Lowest K = 3.2 mmol/L in last 2 days, will replace as needed  # Hyponatremia: Lowest Na = 134 mmol/L in last 2 days, will monitor as appropriate       # Hypoalbuminemia: Lowest albumin = 2.3 g/dL at 1/25/2025 12:54 PM, will monitor as appropriate  # Drug Induced Coagulation Defect: home medication list includes an anticoagulant medication    # Hypertension: Noted on problem list      # Anemia: based on hgb <11       # Overweight: Estimated body mass index is 27.63 kg/m  as calculated from the following:    Height as of this encounter: 1.6 m (5' 3\").    Weight as of this encounter: 70.8 kg (156 lb).       # Financial/Environmental Concerns:           Disposition Plan      Expected Discharge Date: 01/27/2025                The patient's care was discussed with the Attending Physician, Dr. Wynn .      [facility]  Medical Student  Medicine Service, [facility] TEAM 2  [facility]  Securely message with Vite (more info)  Text page via [facility] Paging/Directory   See signed in provider for up to date coverage information  ______________________________________________________________________    Chief Complaint   Abdominal pain, generalized weakness    History is " obtained from the patient and patient's daughter and grandson.    History of Present Illness   Alis Hartman (Tanya) is a 69 year old female with hx of endometrial cancer, no longer pursuing chemotherapy, as well as previous UTI with urosepsis and bacteremia who comes to the ED with low energy, abdominal pain, and a difference in emotional status as described by patient's family. Aranza had returned home from long term care facility, following a hospitalization for urosepsis in late November. Daughter states that suprapubic catheter had been set to be changed on 1/15, but appointment not able to be kept. She then noticed increasingly darker urine coming through catheter, along with what she feels was increased low mood, fatigue from her mother, as well increased abdominal pain expressed by Aranza herself. Aranza does endorse headache and nausea over the last few days, but has been able to eat and drink as per her normal. She was able to eat a sandwich for lunch today and has been able to drink fluids. She does note harder stools lately, but no pain with bowel movements, no abdominal pain with voiding from catheter site. She does endorse diffuse lower back pain.     ED course: In the ED, normal CXR, labs with K of 3.2 but otherwise normal electrolytes, Hgb 10, and a grossly abnormal UA with hematuria, positive leuk esterase, increased WBC/RBCs.       Past Medical History    Past Medical History:   Diagnosis Date    Atrial fibrillation (H)     Depression     Hypertension     Malignant neoplasm of endocervix (H)     Tx with radiation    Near syncope 11/7/2024    Orthopnea 9/21/2024    Other chronic pain     Low back    Schizophrenia (H)     Urinary incontinence        Past Surgical History   Past Surgical History:   Procedure Laterality Date    ABDOMINOPLASTY      BIOPSY CERVICAL, LOCAL EXCISION, SINGLE/MULTIPLE  10/26/2011    Procedure:BIOPSY CERVICAL, LOCAL EXCISION, SINGLE/MULTIPLE; EUA, cervical biopsies;  Surgeon:BETTY TINEO; Location:[facility] OR    BIOPSY VAGINAL N/A 06/08/2021    Procedure: BIOPSY, VAGINA;  Surgeon: Dany Perez MD;  Location: [facility] OR    CYSTOSCOPY N/A 05/17/2024    Procedure: Cystoscopy;  Surgeon: Dany Perez MD;  [facility]: [facility]    CYSTOSTOMY, INSERT TUBE SUPRAPUBIC, COMBINED  07/12/2024    Procedure: Insertion of supra-pubic catheter;  Surgeon: Dany Perez MD;  Location: [facility]    DILATION AND CURETTAGE, WITH ULTRASOUND GUIDANCE N/A 05/17/2024    Procedure: Dilation and curettage of the uterus with drainage of uterine fluid under ultrasound guidance, lysis of vaginal adhesions;  Surgeon: Dany Perez MD;  Location: [facility] OR    EXAM UNDER ANESTHESIA PELVIC N/A 03/12/2020    Procedure: Exam under anesthsia, vaginal biopsies, possible CO2 laser of the vagina;  Surgeon: Lilliam Roy MD;  [facility]: [facility] OR    GI SURGERY  2004    gastric bypass    HYSTERECTOMY TOTAL ABDOMINAL, BILATERAL SALPINGO-OOPHORECTOMY, COMBINED Bilateral 07/12/2024    Procedure: Diagnostic laparoscopy converted to exploratory laparotomy, total abdominal hysterectomy, bilateral salpingo-oophorectomy, lysis of adhesions >60 min;  Surgeon: Dany Perez MD;  Location: [facility]    INSERT PORT VASCULAR ACCESS N/A 08/26/2024    Procedure: Insert port vascular access;  Surgeon: Avery Gregory MD;  Location: [facility] OR    IR CHEST PORT PLACEMENT > 5 YRS OF AGE  08/26/2024    IR NEPHROSTOMY TUBE PLACEMENT LEFT  11/29/2024    LASER CO2 VAGINA N/A 03/02/2015    Procedure: LASER CO2 VAGINA;  Surgeon: Mariela Abdalla MD;  Location: [facility] OR    LASER CO2 VAGINA N/A 05/24/2019    Procedure: Exam under anesthesia, vaginal biopsies, CO2 laser of the vagina;  Surgeon: Lilliam Roy MD;  Location: [facility] OR    LASER CO2 VAGINA N/A 06/08/2021    Procedure: Exam under anesthesia, laser ablation of the upper vagina;  Surgeon: Dany Perez MD;  Location: [facility] OR    PICC DOUBLE LUMEN PLACEMENT Left 11/28/2024    left  "basilic 5 fr dl power picc 44 cm    REPAIR BLADDER N/A 07/12/2024    Procedure: Repair bladder;  Surgeon: Dany Perez MD;  Location: [facility] OR       Prior to Admission Medications   Prior to Admission Medications   Prescriptions Last Dose Informant Patient Reported? Taking?   Skin Protectants, Misc. (INTERDRY 10\"X36\") SHEE  Self No No   Sig: Externally apply 10 each topically every 24 hours. Apply to skin folds on abdomen   acetaminophen (TYLENOL) 325 MG tablet  Self No No   Sig: Take 2 tablets (650 mg) by mouth every 6 hours as needed for mild pain   albuterol (PROAIR HFA/PROVENTIL HFA/VENTOLIN HFA) 108 (90 Base) MCG/ACT inhaler  Self Yes No   Sig: Inhale 1-2 puffs into the lungs every 4 hours as needed for shortness of breath   apixaban ANTICOAGULANT (ELIQUIS) 5 MG tablet   No No   Sig: Take 1 tablet (5 mg) by mouth 2 times daily.   calcium Citrate-vitamin D 500-400 MG-UNIT CHEW  Self Yes No   Sig: Take 1 tablet by mouth daily   childrens multivitamin w/iron (FLINTSTONES COMPLETE) 60 MG chewable tablet  Self Yes No   Sig: Take 2 tablets by mouth daily   Patient not taking: Reported on 1/14/2025   cholecalciferol 125 MCG (5000 UT) CAPS  Self Yes No   Sig: Take 5000 mg 4 times a week   cyanocobalamin (CYANOCOBALAMIN) 1000 MCG/ML injection  Self Yes No   Sig: Inject 1 mL (1,000 mcg) into a muscle every month.   diclofenac (VOLTAREN) 1 % topical gel   No No   Sig: Apply 4 g topically 4 times daily as needed for moderate pain.   ferrous sulfate (CASSANDRA-IN-SOL) 75 (15 FE) MG/ML oral drops  Self Yes No   Sig: Take 15 mg by mouth daily   hydrocortisone 2.5 % cream  Self Yes No   Sig: Apply topically as needed   lidocaine (XYLOCAINE) 5 % external ointment  Self Yes No   Sig: Apply 1 Application topically as needed   melatonin 3 MG tablet   No No   Sig: Take 1 tablet (3 mg) by mouth or Feeding Tube at bedtime.   naloxone (NARCAN) 4 MG/0.1ML nasal spray  Self No No   Sig: Spray 1 spray (4 mg) into one nostril alternating " nostrils as needed for opioid reversal every 2-3 minutes until assistance arrives   nitroFURantoin macrocrystal-monohydrate (MACROBID) 100 MG capsule   No No   Sig: Take 1 capsule (100 mg) by mouth 2 times daily.   ondansetron (ZOFRAN) 8 MG tablet  Self No No   Sig: Take 1 tablet (8 mg) by mouth every 8 hours as needed for nausea   Patient not taking: Reported on 1/14/2025   oxyCODONE (ROXICODONE) 5 MG tablet   No No   Sig: Take 1 tablet (5 mg) by mouth every 6 hours as needed for severe pain.   oxyCODONE (ROXICODONE) 5 MG tablet   No No   Sig: Take 1 tablet (5 mg) by mouth every 8 hours.   phenol (CHLORASEPTIC) 1.4 % spray   No No   Sig: Take 1-2 sprays (1-2 mLs) by mouth every hour as needed for sore throat.   polyethylene glycol (MIRALAX) 17 GM/Dose powder  Self No No   Sig: Take 17 g by mouth daily as needed for constipation   prochlorperazine (COMPAZINE) 5 MG tablet  Self No No   Sig: Take 1-2 tablets (5-10 mg) by mouth every 6 hours as needed for nausea or vomiting   Patient not taking: Reported on 1/14/2025   senna-docusate (SENOKOT-S/PERICOLACE) 8.6-50 MG tablet  Self No No   Sig: Take 1 tablet by mouth 2 times daily   triamcinolone (KENALOG) 0.1 % external ointment  Self No No   Sig: Apply topically 3 times daily as needed for irritation.      Facility-Administered Medications: None          Physical Exam   Vital Signs: Temp: 97.8  F (36.6  C) Temp src: Oral BP: 128/85 Pulse: 66   Resp: 16 SpO2: 100 % O2 Device: None (Room air)    Weight: 156 lbs 0 oz    General Appearance: Sitting in wheelchair, minimally responsive to questions, with flattened affect.  Respiratory: Lungs clear to auscultation bilaterally. No wheezes or crackles.  Cardiovascular: Regular rate, normal sinus rhythm with no appreciable murmurs, rubs, gallops.   GI: Abdomen soft, nondistended, mildly tender to palpation with small amount of pus around catheter entry site.      Medical Decision Making       Please see A&P for additional details  of medical decision making.      Data     I have personally reviewed the following data over the past 24 hrs:    10.2  \   10.2 (L)   / 282     134 (L) 101 11.3 /  117 (H)   3.2 (L) 23 0.98 (H) \     ALT: 14 AST: 19 AP: 129 TBILI: 0.7   ALB: 2.3 (L) TOT PROTEIN: 5.3 (L) LIPASE: N/A     Procal: N/A CRP: N/A Lactic Acid: 1.6         Imaging results reviewed over the past 24 hrs:   Recent Results (from the past 24 hours)   XR Chest 1 View    Narrative    Exam: XR CHEST 1 VIEW, 1/25/2025 1:16 PM    Indication: weakness    Comparison: 11/27/2024    Findings:   Right chest Port-A-Cath tip terminates over the SVC. No tracheal or  mediastinal deviation. No focal pulmonary opacities. No pneumothorax  or pleural effusion. Normal cardiac silhouette. No acute osseous  abnormalities. A few  air-filled distended bowel loops in the  visualized upper abdomen.      Impression    Impression: No acute airspace opacity.    I have personally reviewed the examination and initial interpretation  and I agree with the findings.    RENNY SWANN MD         SYSTEM ID:  [id number]

## 2025-01-25 NOTE — ED TRIAGE NOTES
"Triage Assessment & Note:    /85   Pulse 66   Temp 97.8  F (36.6  C) (Oral)   Resp 16   Ht 1.6 m (5' 3\")   Wt 70.8 kg (156 lb)   SpO2 100%   BMI 27.63 kg/m        Patient presents with: Pt comes to triage in wc with family for hematuria and altered mental status. Pt reports recently being discharged from rehab and needing to get catheter changed. No reports of fever, cough, SOB, CP, or travel.     Home Treatments/Remedies: Home medications    Febrile / Afebrile: afebrile    Duration of C/o: ongoing issues    Mar Mñuoz RN  January 25, 2025            "

## 2025-01-25 NOTE — ED PROVIDER NOTES
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    Patient presents with her 2 children.  They collaborate on the history.    They note about 2 to 3 days ago patient discharged from her long-term care facility and is now back home.  Family feels that over the last 1 week patient has had change in mental status which they further describe as her becoming more tearful and emotional.  They also notes since leaving her facility she has had ongoing hematuria.  They note that few days ago she did have some clots in the Shahid bag.  Patient has suprapubic catheter placed last summer.    They have not noticed any fevers patient has noted that she has felt cold with her symptoms.  No significant cough, dyspnea no chest pain.  She denies any vomiting, or significant abdominal pain.  She states she has had some hard stools over the last few days and the son thinks he may be noticed some bright red blood with wiping a few days ago as well.  No recent falls.  Intake has been good.  Output from the Shahid bag has been normal per daughter.  They note that they did unfortunately miss change out for the suprapubic catheter last week and are scheduled to have this done on January 29.    Past Medical History  Past Medical History:   Diagnosis Date    Atrial fibrillation (H)     Depression     Hypertension     Malignant neoplasm of endocervix (H)     Tx with radiation     Near syncope 11/7/2024    Orthopnea 9/21/2024    Other chronic pain     Low back    Schizophrenia (H)     Urinary incontinence      Past Surgical History:   Procedure Laterality Date    ABDOMINOPLASTY      BIOPSY CERVICAL, LOCAL EXCISION, SINGLE/MULTIPLE  10/26/2011    Procedure:BIOPSY CERVICAL, LOCAL EXCISION, SINGLE/MULTIPLE; EUA, cervical biopsies; Surgeon:BETTY TINEO; Location:[facility] OR    BIOPSY VAGINAL N/A 06/08/2021    Procedure: BIOPSY, VAGINA;  Surgeon: Dany Perez MD;  Location: [facility] OR    CYSTOSCOPY N/A 05/17/2024    Procedure: Cystoscopy;  Surgeon: Dany Perez MD;  Location: [facility]    CYSTOSTOMY, INSERT TUBE SUPRAPUBIC, COMBINED  07/12/2024    Procedure: Insertion of supra-pubic catheter;  Surgeon: Dany Perez MD;  [facility]: [facility]    DILATION AND CURETTAGE, WITH ULTRASOUND GUIDANCE N/A 05/17/2024    Procedure: Dilation and curettage of the uterus with drainage of uterine fluid under ultrasound guidance, lysis of vaginal adhesions;  Surgeon: Dany Perez MD;  Location: [facility]    EXAM UNDER ANESTHESIA PELVIC N/A 03/12/2020    Procedure: Exam under anesthsia, vaginal biopsies, possible CO2 laser of the vagina;  Surgeon: Lilliam Roy MD;  [facility]: [facility]    GI SURGERY  2004    gastric bypass    HYSTERECTOMY TOTAL ABDOMINAL, BILATERAL SALPINGO-OOPHORECTOMY, COMBINED Bilateral 07/12/2024    Procedure: Diagnostic laparoscopy converted to exploratory laparotomy, total abdominal hysterectomy, bilateral salpingo-oophorectomy, lysis of adhesions >60 min;  Surgeon: Dany Perez MD;  [facility]    INSERT PORT VASCULAR ACCESS N/A 08/26/2024    Procedure: Insert port vascular access;  Surgeon: Avery Gregory MD;  Location: [facility]    IR CHEST PORT PLACEMENT > 5 YRS OF AGE  08/26/2024    IR NEPHROSTOMY TUBE PLACEMENT LEFT  11/29/2024    LASER CO2 VAGINA N/A 03/02/2015    Procedure: LASER CO2 VAGINA;  Surgeon: Mariela Abdalla MD;  Location: [facility] OR    LASER CO2 VAGINA N/A  "05/24/2019    Procedure: Exam under anesthesia, vaginal biopsies, CO2 laser of the vagina;  Surgeon: Lilliam Roy MD;  Location: [facility] OR    LASER CO2 VAGINA N/A 06/08/2021    Procedure: Exam under anesthesia, laser ablation of the upper vagina;  Surgeon: Dany Perez MD;  Location: [facility] OR    PICC DOUBLE LUMEN PLACEMENT Left 11/28/2024    left basilic 5 fr dl power picc 44 cm    REPAIR BLADDER N/A 07/12/2024    Procedure: Repair bladder;  Surgeon: Dany Perez MD;  Location: [facility]     acetaminophen (TYLENOL) 325 MG tablet  albuterol (PROAIR HFA/PROVENTIL HFA/VENTOLIN HFA) 108 (90 Base) MCG/ACT inhaler  apixaban ANTICOAGULANT (ELIQUIS) 5 MG tablet  calcium Citrate-vitamin D 500-400 MG-UNIT CHEW  childrens multivitamin w/iron (FLINTSTONES COMPLETE) 60 MG chewable tablet  cholecalciferol 125 MCG (5000 UT) CAPS  cyanocobalamin (CYANOCOBALAMIN) 1000 MCG/ML injection  diclofenac (VOLTAREN) 1 % topical gel  ferrous sulfate (CASSANDRA-IN-SOL) 75 (15 FE) MG/ML oral drops  hydrocortisone 2.5 % cream  lidocaine (XYLOCAINE) 5 % external ointment  melatonin 3 MG tablet  naloxone (NARCAN) 4 MG/0.1ML nasal spray  nitroFURantoin macrocrystal-monohydrate (MACROBID) 100 MG capsule  ondansetron (ZOFRAN) 8 MG tablet  oxyCODONE (ROXICODONE) 5 MG tablet  oxyCODONE (ROXICODONE) 5 MG tablet  phenol (CHLORASEPTIC) 1.4 % spray  polyethylene glycol (MIRALAX) 17 GM/Dose powder  prochlorperazine (COMPAZINE) 5 MG tablet  senna-docusate (SENOKOT-S/PERICOLACE) 8.6-50 MG tablet  Skin Protectants, Misc. (INTERDRY 10\"X36\") SHEE  triamcinolone (KENALOG) 0.1 % external ointment      Allergies   Allergen Reactions    Ibuprofen Nausea and Vomiting    Shrimp     Sulfa Antibiotics Rash     Patient started on bactrim 7/24/24 tolerating medication without rash on 7/25      Family History  Family History   Problem Relation Age of Onset    Heart Disease Father     Heart Failure Father     No Known Problems Brother     No Known Problems Brother     No " "Known Problems Brother     Pancreatitis Brother     Hypertension Sister     Peripheral Vascular Disease Sister     No Known Problems Sister     No Known Problems Son     No Known Problems Daughter      Social History   Social History     Tobacco Use    Smoking status: Never    Smokeless tobacco: Never   Substance Use Topics    Alcohol use: Not Currently    Drug use: No      A complete review of systems was performed with pertinent positives and negatives noted in the HPI, and all other systems negative.    Physical Exam   BP: 128/85  Pulse: 66  Temp: 97.8  F (36.6  C)  Resp: 16  Height: 160 cm (5' 3\")  Weight: 70.8 kg (156 lb)  SpO2: 100 %  Physical Exam      GENERAL APPEARANCE: The patient is well developed, well appearing, and in no acute distress.  HEAD:  Normocephalic and atraumatic.   EENT: Voice normal.  NECK: Trachea is midline.No lymphadenopathy or tenderness.  LUNGS: Breath sounds are equal and clear bilaterally. No wheezes, rhonchi, or rales.  HEART: Regular rate and normal rhythm.    ABDOMEN: Soft, flat, and benign. No mass, tenderness, guarding, or rebound.suprapubic catheter present.  There is some scant smegma around the insertion site without erythema tenderness to palpation lymphangitic streaking or fluctuance.  Urine output is light red in color.  No visible clots.  EXTREMITIES: No cyanosis, clubbing, or edema.  NEUROLOGIC: No focal sensory or motor deficits are noted.  PSYCHIATRIC: The patient is awake, alert.  Appropriate mood and affect.  SKIN: Warm, dry, and well perfused. Good turgor.      ED Course, Procedures, & Data        IV Antibiotics given and/or elevated Lactate of 1.6 and no sepsis note found - Delete this reminder and enter the sepsis note or '.edcms' before signing chart.>>>     Results for orders placed or performed during the hospital encounter of 01/25/25   XR Chest 1 View     Status: None    Narrative    Exam: XR CHEST 1 VIEW, 1/25/2025 1:16 PM    Indication: " weakness    Comparison: 11/27/2024    Findings:   Right chest Port-A-Cath tip terminates over the SVC. No tracheal or  mediastinal deviation. No focal pulmonary opacities. No pneumothorax  or pleural effusion. Normal cardiac silhouette. No acute osseous  abnormalities. A few  air-filled distended bowel loops in the  visualized upper abdomen.      Impression    Impression: No acute airspace opacity.    I have personally reviewed the examination and initial interpretation  and I agree with the findings.    RENNY SWANN MD         SYSTEM ID:  [id number]   Comprehensive metabolic panel     Status: Abnormal   Result Value Ref Range    Sodium 134 (L) 135 - 145 mmol/L    Potassium 3.2 (L) 3.4 - 5.3 mmol/L    Carbon Dioxide (CO2) 23 22 - 29 mmol/L    Anion Gap 10 7 - 15 mmol/L    Urea Nitrogen 11.3 8.0 - 23.0 mg/dL    Creatinine 0.98 (H) 0.51 - 0.95 mg/dL    GFR Estimate 61 >60 mL/min/1.73m2    Calcium 7.7 (L) 8.8 - 10.4 mg/dL    Chloride 101 98 - 107 mmol/L    Glucose 117 (H) 70 - 99 mg/dL    Alkaline Phosphatase 129 40 - 150 U/L    AST 19 0 - 45 U/L    ALT 14 0 - 50 U/L    Protein Total 5.3 (L) 6.4 - 8.3 g/dL    Albumin 2.3 (L) 3.5 - 5.2 g/dL    Bilirubin Total 0.7 <=1.2 mg/dL   Lactic acid whole blood     Status: Normal   Result Value Ref Range    Lactic Acid 1.6 0.7 - 2.0 mmol/L   Influenza A/B, RSV and SARS-CoV2 PCR (COVID-19) Nose     Status: Normal    Specimen: Nose; Swab   Result Value Ref Range    Influenza A PCR Negative Negative    Influenza B PCR Negative Negative    RSV PCR Negative Negative    SARS CoV2 PCR Negative Negative    Narrative    Testing was performed using the Xpert Xpress CoV2/Flu/RSV Assay on the Cepheid GeneXpert Instrument. This test should be ordered for the detection of SARS-CoV2, influenza, and RSV viruses in individuals with signs and symptoms of respiratory tract infection. This test is for in vitro diagnostic use under the [place] FDA for laboratories certified under CLIA to perform high or  moderate complexity testing. This test has been [place] FDA cleared. A negative result does not rule out the presence of PCR inhibitors in the specimen or target RNA in concentration below the limit of detection for the assay. If only one viral target is positive but coinfection with multiple targets is suspected, the sample should be re-tested with another FDA cleared, approved, or authorized test, if coninfection would change clinical management. This test was validated by the [facility]. These laboratories are certified under the Clinical Laboratory Improvement Amendments of 1988 (CLIA-88) as qualified to perfom high complexity laboratory testing.   CBC with platelets and differential     Status: Abnormal   Result Value Ref Range    WBC Count 10.2 4.0 - 11.0 10e3/uL    RBC Count 3.51 (L) 3.80 - 5.20 10e6/uL    Hemoglobin 10.2 (L) 11.7 - 15.7 g/dL    Hematocrit 30.5 (L) 35.0 - 47.0 %    MCV 87 78 - 100 fL    MCH 29.1 26.5 - 33.0 pg    MCHC 33.4 31.5 - 36.5 g/dL    RDW 16.3 (H) 10.0 - 15.0 %    Platelet Count 282 150 - 450 10e3/uL    % Neutrophils 81 %    % Lymphocytes 11 %    % Monocytes 8 %    % Eosinophils 0 %    % Basophils 0 %    % Immature Granulocytes 0 %    NRBCs per 100 WBC 0 <1 /100    Absolute Neutrophils 8.2 1.6 - 8.3 10e3/uL    Absolute Lymphocytes 1.1 0.8 - 5.3 10e3/uL    Absolute Monocytes 0.8 0.0 - 1.3 10e3/uL    Absolute Eosinophils 0.0 0.0 - 0.7 10e3/uL    Absolute Basophils 0.0 0.0 - 0.2 10e3/uL    Absolute Immature Granulocytes 0.0 <=0.4 10e3/uL    Absolute NRBCs 0.0 10e3/uL   [place] Draw     Status: None    Narrative    The following orders were created for panel order [place] Draw.  Procedure                               Abnormality         Status                     ---------                               -----------         ------                     Extra Blue Top Tube[[id number]]                              Final result                 Please view results for these tests  on the individual orders.   Extra Blue Top Tube     Status: None   Result Value Ref Range    Hold Specimen JIC    UA with Microscopic reflex to Culture     Status: Abnormal    Specimen: Urine, Shahid Catheter   Result Value Ref Range    Color Urine Dark Brown (A) Colorless, Straw, Light Yellow, Yellow    Appearance Urine Cloudy (A) Clear    Glucose Urine 30 (A) Negative mg/dL    Bilirubin Urine Negative Negative    Ketones Urine Negative Negative mg/dL    Specific Gravity Urine 1.015 1.003 - 1.035    Blood Urine Large (A) Negative    pH Urine 7.0 5.0 - 7.0    Protein Albumin Urine 300 (A) Negative mg/dL    Urobilinogen Urine Normal Normal, 2.0 mg/dL    Nitrite Urine Negative Negative    Leukocyte Esterase Urine Large (A) Negative    WBC Clumps Urine Present (A) None Seen /HPF    RBC Urine >182 (H) <=2 /HPF    WBC Urine >182 (H) <=5 /HPF    Narrative    Urine Culture ordered based on laboratory criteria   CBC with platelets differential     Status: Abnormal    Narrative    The following orders were created for panel order CBC with platelets differential.  Procedure                               Abnormality         Status                     ---------                               -----------         ------                     CBC with platelets and d...[[id number]]  Abnormal            Final result                 Please view results for these tests on the individual orders.     Medications   ertapenem (INVanz) 1 g vial to attach to  mL bag (has no administration in time range)   potassium chloride (KAYCIEL) solution 20 mEq (20 mEq Oral $Given 1/25/25 9872)     Labs Ordered and Resulted from Time of ED Arrival to Time of ED Departure   COMPREHENSIVE METABOLIC PANEL - Abnormal       Result Value    Sodium 134 (*)     Potassium 3.2 (*)     Carbon Dioxide (CO2) 23      Anion Gap 10      Urea Nitrogen 11.3      Creatinine 0.98 (*)     GFR Estimate 61      Calcium 7.7 (*)     Chloride 101      Glucose 117 (*)      Alkaline Phosphatase 129      AST 19      ALT 14      Protein Total 5.3 (*)     Albumin 2.3 (*)     Bilirubin Total 0.7     CBC WITH PLATELETS AND DIFFERENTIAL - Abnormal    WBC Count 10.2      RBC Count 3.51 (*)     Hemoglobin 10.2 (*)     Hematocrit 30.5 (*)     MCV 87      MCH 29.1      MCHC 33.4      RDW 16.3 (*)     Platelet Count 282      % Neutrophils 81      % Lymphocytes 11      % Monocytes 8      % Eosinophils 0      % Basophils 0      % Immature Granulocytes 0      NRBCs per 100 WBC 0      Absolute Neutrophils 8.2      Absolute Lymphocytes 1.1      Absolute Monocytes 0.8      Absolute Eosinophils 0.0      Absolute Basophils 0.0      Absolute Immature Granulocytes 0.0      Absolute NRBCs 0.0     ROUTINE UA WITH MICROSCOPIC REFLEX TO CULTURE - Abnormal    Color Urine Dark Brown (*)     Appearance Urine Cloudy (*)     Glucose Urine 30 (*)     Bilirubin Urine Negative      Ketones Urine Negative      Specific Gravity Urine 1.015      Blood Urine Large (*)     pH Urine 7.0      Protein Albumin Urine 300 (*)     Urobilinogen Urine Normal      Nitrite Urine Negative      Leukocyte Esterase Urine Large (*)     WBC Clumps Urine Present (*)     RBC Urine >182 (*)     WBC Urine >182 (*)    LACTIC ACID WHOLE BLOOD - Normal    Lactic Acid 1.6     INFLUENZA A/B, RSV AND SARS-COV2 PCR - Normal    Influenza A PCR Negative      Influenza B PCR Negative      RSV PCR Negative      SARS CoV2 PCR Negative     URINE CULTURE     XR Chest 1 View   Final Result   Impression: No acute airspace opacity.      I have personally reviewed the examination and initial interpretation   and I agree with the findings.      RENNY SWANN MD            SYSTEM ID:  [id number]             Critical care was not performed.     Medical Decision Making  The patient's presentation was of moderate complexity (an undiagnosed new problem with uncertain prognosis).    The patient's evaluation involved:  review of external note(s) from 3+ sources (see  separate area of note for details)  review of 3+ test result(s) ordered prior to this encounter (see separate area of note for details)  ordering and/or review of 3+ test(s) in this encounter (see separate area of note for details)  discussion of management or test interpretation with another health professional (urology, hospitalist)    The patient's management necessitated moderate risk (prescription drug management including medications given in the ED) and high risk (a decision regarding hospitalization).    Assessment & Plan    This is a medically complex 69-year-old female presented with concerns of several days of hematuria in the setting of use of anticoagulation, and history of UTI.  On presentation to the department she is afebrile and normoxic and well-appearing.  Suprapubic catheter site does have some smegma present without significant erythema fluctuance or findings suggest deep space infection.  I did change out the suprapubic catheter at bedside and we obtained clean-catch urine sample off of the new catheter which was sent to the lab in addition to routine laboratory studies, CBC chemistry also initiated chest x-ray evaluate possible pneumonia as well as respiratory swab as potential causes of change in mental status.  Patient denies any head injury headache fall do not suspect acute intracranial process requiring neuroimaging.    Labs reviewed CBC without leukocytosis patient anemic 10.2 not far from her baseline.  Chemistry shows hypokalemia to 3.2 which was repleted orally at bedside.  Creatinine just above prior values from January 14.  Lactic acid within reference range.  COVID-negative.  Chest x-ray without findings for consolidation on my independent interpretation.  UA was obtained from the newly placed suprapubic catheter (I did discuss this with urology prior to changing of the catheter) and does return suggestive of infection with large leukocyte esterase greater than 182 WBCs, clumps of  WBCs and hematuria.  This will be sent for culture.    In reviewing patient's prior urine cultures, unfortunately there is not a good outpatient oral option to cover her resistant organisms.  In discussion with the pharmacist they recommend ertapenem which was initiated in the ED.  Blood cultures were also initiated.    Discussed with patient observation stay for pending urine culture and improvement with hematuria patient may also need urologic consult while in the hospital as well.  She is agreeable.  Patient admitted observation status to the medicine service on the [facility].    Patient seen and discussed with attending physician , who agrees with my plan of care.    I have reviewed the nursing notes. I have reviewed the findings, diagnosis, plan and need for follow up with the patient.    New Prescriptions    No medications on file       Final diagnoses:   UTI (urinary tract infection)   History of ESBL E. coli infection   Suprapubic catheter (H)       Melissa Stephanie, PAC  [facility] EMERGENCY DEPARTMENT  1/25/2025     Melissa Brown PA-C  01/25/25 1821

## 2025-01-26 ENCOUNTER — APPOINTMENT (OUTPATIENT)
Dept: PHYSICAL THERAPY | Facility: CLINIC | Age: 70
DRG: 696 | End: 2025-01-26
Payer: COMMERCIAL

## 2025-01-26 LAB
ANION GAP SERPL CALCULATED.3IONS-SCNC: 9 MMOL/L (ref 7–15)
BUN SERPL-MCNC: 13.7 MG/DL (ref 8–23)
CALCIUM SERPL-MCNC: 8.2 MG/DL (ref 8.8–10.4)
CHLORIDE SERPL-SCNC: 100 MMOL/L (ref 98–107)
CREAT SERPL-MCNC: 1.03 MG/DL (ref 0.51–0.95)
EGFRCR SERPLBLD CKD-EPI 2021: 58 ML/MIN/1.73M2
ERYTHROCYTE [DISTWIDTH] IN BLOOD BY AUTOMATED COUNT: 16.4 % (ref 10–15)
GLUCOSE SERPL-MCNC: 90 MG/DL (ref 70–99)
HCO3 SERPL-SCNC: 23 MMOL/L (ref 22–29)
HCT VFR BLD AUTO: 28.8 % (ref 35–47)
HGB BLD-MCNC: 9.2 G/DL (ref 11.7–15.7)
MCH RBC QN AUTO: 28.3 PG (ref 26.5–33)
MCHC RBC AUTO-ENTMCNC: 31.9 G/DL (ref 31.5–36.5)
MCV RBC AUTO: 89 FL (ref 78–100)
PHOSPHATE SERPL-MCNC: 2.6 MG/DL (ref 2.5–4.5)
PLATELET # BLD AUTO: 242 10E3/UL (ref 150–450)
POTASSIUM SERPL-SCNC: 3.4 MMOL/L (ref 3.4–5.3)
RBC # BLD AUTO: 3.25 10E6/UL (ref 3.8–5.2)
SODIUM SERPL-SCNC: 132 MMOL/L (ref 135–145)
WBC # BLD AUTO: 8.9 10E3/UL (ref 4–11)

## 2025-01-26 PROCEDURE — 84100 ASSAY OF PHOSPHORUS: CPT

## 2025-01-26 PROCEDURE — 250N000013 HC RX MED GY IP 250 OP 250 PS 637

## 2025-01-26 PROCEDURE — 99232 SBSQ HOSP IP/OBS MODERATE 35: CPT | Mod: GC | Performed by: STUDENT IN AN ORGANIZED HEALTH CARE EDUCATION/TRAINING PROGRAM

## 2025-01-26 PROCEDURE — 97161 PT EVAL LOW COMPLEX 20 MIN: CPT | Mod: GP

## 2025-01-26 PROCEDURE — 99222 1ST HOSP IP/OBS MODERATE 55: CPT | Performed by: INTERNAL MEDICINE

## 2025-01-26 PROCEDURE — 97530 THERAPEUTIC ACTIVITIES: CPT | Mod: GP

## 2025-01-26 PROCEDURE — 36415 COLL VENOUS BLD VENIPUNCTURE: CPT

## 2025-01-26 PROCEDURE — 99253 IP/OBS CNSLTJ NEW/EST LOW 45: CPT | Performed by: INTERNAL MEDICINE

## 2025-01-26 PROCEDURE — 85018 HEMOGLOBIN: CPT

## 2025-01-26 PROCEDURE — 120N000002 HC R&B MED SURG/OB UMMC

## 2025-01-26 PROCEDURE — 80048 BASIC METABOLIC PNL TOTAL CA: CPT

## 2025-01-26 RX ORDER — ERTAPENEM 1 G/1
1 INJECTION, POWDER, LYOPHILIZED, FOR SOLUTION INTRAMUSCULAR; INTRAVENOUS EVERY 24 HOURS
Status: DISCONTINUED | OUTPATIENT
Start: 2025-01-26 | End: 2025-01-26

## 2025-01-26 RX ORDER — POLYETHYLENE GLYCOL 3350 17 G/17G
17 POWDER, FOR SOLUTION ORAL 2 TIMES DAILY PRN
Status: DISCONTINUED | OUTPATIENT
Start: 2025-01-26 | End: 2025-01-28 | Stop reason: HOSPADM

## 2025-01-26 RX ORDER — ERTAPENEM 1 G/1
1 INJECTION, POWDER, LYOPHILIZED, FOR SOLUTION INTRAMUSCULAR; INTRAVENOUS ONCE
Status: DISCONTINUED | OUTPATIENT
Start: 2025-01-26 | End: 2025-01-26

## 2025-01-26 RX ORDER — POLYETHYLENE GLYCOL 3350 17 G/17G
17 POWDER, FOR SOLUTION ORAL DAILY
Status: DISCONTINUED | OUTPATIENT
Start: 2025-01-26 | End: 2025-01-27

## 2025-01-26 RX ORDER — POLYETHYLENE GLYCOL 3350 17 G/17G
17 POWDER, FOR SOLUTION ORAL 2 TIMES DAILY
Status: DISCONTINUED | OUTPATIENT
Start: 2025-01-26 | End: 2025-01-26

## 2025-01-26 RX ADMIN — SENNOSIDES AND DOCUSATE SODIUM 1 TABLET: 50; 8.6 TABLET ORAL at 08:45

## 2025-01-26 RX ADMIN — ACETAMINOPHEN 650 MG: 325 TABLET, FILM COATED ORAL at 13:02

## 2025-01-26 RX ADMIN — SENNOSIDES AND DOCUSATE SODIUM 2 TABLET: 50; 8.6 TABLET ORAL at 20:18

## 2025-01-26 RX ADMIN — ACETAMINOPHEN 650 MG: 325 TABLET, FILM COATED ORAL at 05:50

## 2025-01-26 RX ADMIN — APIXABAN 5 MG: 5 TABLET, FILM COATED ORAL at 08:45

## 2025-01-26 RX ADMIN — APIXABAN 5 MG: 5 TABLET, FILM COATED ORAL at 20:18

## 2025-01-26 RX ADMIN — OXYCODONE HYDROCHLORIDE 5 MG: 5 TABLET ORAL at 08:45

## 2025-01-26 RX ADMIN — POLYETHYLENE GLYCOL 3350 17 G: 17 POWDER, FOR SOLUTION ORAL at 08:45

## 2025-01-26 RX ADMIN — POLYETHYLENE GLYCOL 3350 17 G: 17 POWDER, FOR SOLUTION ORAL at 20:44

## 2025-01-26 RX ADMIN — OXYCODONE HYDROCHLORIDE 5 MG: 5 TABLET ORAL at 17:05

## 2025-01-26 ASSESSMENT — ACTIVITIES OF DAILY LIVING (ADL)
ADLS_ACUITY_SCORE: 69
DEPENDENT_IADLS:: CLEANING;COOKING;LAUNDRY;TRANSPORTATION;MEAL PREPARATION

## 2025-01-26 NOTE — PROGRESS NOTES
-----------------------------------------------------------  Reason for admission:  Primary team notified of pt arrival.  Admitted from:  Via: wheelchair  Accompanied by: family  Belongings: At bedside  Admission Profile: complete  Teaching: orientation to unit and call light- call light within reach, call don't fall, use of console, meal times, when to call for the RN, and enforced importance of safety   Access: Port a cath  Telemetry:Placed on pt  Ht./Wt.: complete  Code Status verified on armband: yes  2 RN Skin Assessment Completed By: Vanessa Barahona  Med Rec completed: yes  Suction/Ambu bag/Flowmeter at bedside: yes  Is patient having diarrhea upon admission- if YES fill out testing algorithm : no    C. Diff Testing Algorithm (MUST be marked YES)   3 or more loose stools in 24 hrs. [] Yes [x] No       Additional symptoms:(At least ONE must be marked yes)   Abdominal pain/discomfort [] Yes [x] No   Fever at least 38C (100.4 F) [] Yes [x] No   Elevated WBC(>11,000) [] Yes [x] No       Exclusion Criteria:  (MUST be marked YES)   Off laxatives for at least 48 hrs. [] Yes [x] No       Pt status:    Temp:  [97.7  F (36.5  C)-97.8  F (36.6  C)] 97.7  F (36.5  C)  Pulse:  [] 113  Resp:  [16] 16  BP: (105-128)/(81-85) 105/81  SpO2:  [98 %-100 %] 98 %

## 2025-01-26 NOTE — PLAN OF CARE
Neuro: A&Ox4.   Cardiac: SR. VSS.   Respiratory: on RA.  GI/: Adequate urine output.   Diet/appetite: Soft and bite sized diet, thin liquids. Fair appetite  Activity:  Assist of 2, lift assist  Pain: At acceptable level on current regimen.   Skin: Open areas on bottom, provider notified and woc consult placed  LDA's: PIV SL    Plan: Continue with POC. Notify primary team with changes.

## 2025-01-26 NOTE — PLAN OF CARE
Goal Outcome Evaluation:      Plan of Care Reviewed With: patient    Overall Patient Progress: no changeOverall Patient Progress: no change    Outcome Evaluation: Pt continues to have a feeling of loneliness and crying for her kids. She will need some anxiety relief intervention.RN has contacted provider for some intervention.

## 2025-01-26 NOTE — PROGRESS NOTES
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      #UTI  #Hx of ESBL urosepsis and bacteremia  #Bladder dysfunction s/p cystostomy and suprapubic catheter  Recently admitted 11/26 - 12/8/2024 for ESBL urosepsis and bacteremia requiring ICU w/ L nephrostomy tube (removed 1/7) treated w/ ertapenem. Scheduled to have suprapubic cath changed 1/15, but missed apt, subsequently presented w/ c/f UTI from family. Catheter exchanged in ED w/ post exchange US dirty and w/ blood. Bcx, Ucx pending. Started on ertapenem, ID consulted given ESBL hx, urology consult deferred given no further hematuria.   - Continue ertapenem  - Follow cultures  - ID consult  - WOC for suprapubic catheter     #Constipation  - Scheduled daily miralax  - Scheduled senna    #CKD  Cystatin C of 1.6, Cystatin calculated GFR of 37. Appears to be at baseline.   - Monitor     #Afib  - Continue PTA eliquis    #Hx Kiko-en-Y gastric bypass  #Hypophosphatemia  #Hypokalemia  #Hyponatremia  - Monitor electrolytes, replace as indicated  - Soft and bite sized diet    #Chronic bilateral lower extremity edema  #Deconditioning  Hx of chronic edema, no PTA diuretics.  - Lymphedema consult  - PT/OT     #MDD  #Schizophrenia  Psych consulted during recent admission and stopped all previous psychiatric meds 2/2 c/f over sedation. Family opposed to any psychiatric meds.     #Chronic abdominal pain  In s/o cancer and multiple prior abdominal surgeries w/ chronic suprapubic catheter.  - Continue PTA oxycodone prn    #Serous endometrial carcinoma  Follows w/  "heme/onc through [place]. S/p SNEHA, BSO 07/2024 s/p cycle 3 carbo/taxel 10/15/2024, cycle 4 delayed in s/o recent admission, family ultimately decided to forgo any further treatment.     #HTN  Atenolol stopped during recent admission 2/2 c/f chronotropic incompetence.     #Chronic anemia, stable  Likely 2/2 renal disease. Hb stable, no evidence of bleeding s/p catheter exchange.  - Monitor        Diet: Soft & Bite Sized Diet (level 6) Thin Liquids (level 0)    DVT Prophylaxis: PTA apixaban  Shahid Catheter: Not present  Lines: PRESENT      Port a Cath 11/07/24 Single Lumen Right Chest wall-Site Assessment: WDL      Cardiac Monitoring: None  Code Status: Full Code      Clinically Significant Risk Factors Present on Admission        # Hypokalemia: Lowest K = 3.2 mmol/L in last 2 days, will replace as needed  # Hyponatremia: Lowest Na = 132 mmol/L in last 2 days, will monitor as appropriate       # Hypoalbuminemia: Lowest albumin = 2.3 g/dL at 1/25/2025 12:54 PM, will monitor as appropriate  # Drug Induced Coagulation Defect: home medication list includes an anticoagulant medication    # Hypertension: Noted on problem list      # Anemia: based on hgb <11       # Overweight: Estimated body mass index is 27.63 kg/m  as calculated from the following:    Height as of this encounter: 1.6 m (5' 3\").    Weight as of this encounter: 70.8 kg (156 lb).       # Financial/Environmental Concerns:           Social Drivers of Health   Transportation Needs: Low Risk  (11/27/2024)    Transportation Needs     Within the past 12 months, has lack of transportation kept you from medical appointments, getting your medicines, non-medical meetings or appointments, work, or from getting things that you need?: No   Recent Concern: Transportation Needs - High Risk (9/21/2024)    Transportation Needs     Within the past 12 months, has lack of transportation kept you from medical appointments, getting your medicines, non-medical meetings or " appointments, work, or from getting things that you need?: Yes         Disposition Plan     Medically Ready for Discharge: Anticipated in 2-4 Days     The patient's care was discussed with the Attending Physician, Dr. Dr. Blair .    Arya Veloz MD  Medicine Service, [address]  Securely message with [employer] (more info)  Text page via [facility] Paging/Directory   See signed in provider for up to date coverage information  ______________________________________________________________________    Interval History   Patient reports continued chronic abdominal pain as well as ongoing constipation. No further blood in urine. Becomes tearful during interview and appears overwhelmed.     Physical Exam   Vital Signs: Temp: 97.7  F (36.5  C) Temp src: Oral BP: 98/82 Pulse: (!) 113   Resp: 18 SpO2: 98 % O2 Device: None (Room air)    Weight: 156 lbs 0 oz    Gen: resting comfortably in bed, NAD  HEENT: normocephalic, atruamatic, anicteric sclerae  CV: RRR, no murmur  Resp: no respiratory distress, lung sounds clear to auscultation throughout  Abd: soft, nontender, non distended, suprapubic catheter in place w/ straw colored output, some purulent discharge noted around tube site, no surrounding erythema, tenderness  MSK: Bilateral peripheral edema  Skin: no rashes on exposed skin  Neuro: no focal deficit, tearful    Medical Decision Making       Please see A&P for additional details of medical decision making.      Data     I have personally reviewed the following data over the past 24 hrs:    8.9  \   9.2 (L)   / 242     132 (L) 100 13.7 /  90   3.4 23 1.03 (H) \       Imaging results reviewed over the past 24 hrs:   No results found for this or any previous visit (from the past 24 hours).

## 2025-01-26 NOTE — PLAN OF CARE
Goal Outcome Evaluation:      Plan of Care Reviewed With: patient    Overall Patient Progress: no changeOverall Patient Progress: no change     CMA completed for ERS. Discharge plan TBD

## 2025-01-26 NOTE — CONSULTS
GENERAL ID SERVICE CONSULTATION     Patient:  Alis Hartman   Date of birth 1955, Medical record number [id number]  Date of Visit:  01/26/2025  Date of Admission: 1/25/2025  Consult Requester:Cindy Blair MD          Assessment and Recommendations:   ASSESSMENT:  Pyuria and hematuria.   No fever, no leukocytosis.   In this patient with endometrial CA resulting in surgical intervention and resection of part of the bladder, indwelling suprapubic catheter, and recent history of nephrostomy that was removed, hematuria, pyuria and bacteriuria are expected. The kent catheter was exchanged 1/25/25.   In the absence of SIRS, changing the catheter was sufficient. No indications for ABx.     DISCUSSION:     RECOMMENDATION:  - Discontinue ertapenem. No indications for ABx.   - OK to discharge from ID stand of point.   - unless with signs and symptoms of UTI, I would avoid frequent UA and Ucx checks as they will likely be with abnormal UA and positive Ucx and will likely result in unnecessary therapy with ABx that will select for more resistance.      Thank you for this consult. ID will sign off.     Patient was discussed with Dr. Blair.     Everett Pratt MD    ________________________________________________________________    Consult Question:.  Admission Diagnosis: UTI (urinary tract infection) [N39.0]  History of ESBL E. coli infection [Z86.19]  Suprapubic catheter (H) [Z93.59]         History of Present Illness:   Patient with serous endometrial adenoCA with bilateral hydronephrosis requiring nephrostomies (now removed), s/p SNEHA with Bi oopherectomy, partial bladder resection all around 7/2024. Currently with indwelling suprapubic catheter.   Multiple course of ABx in the past for presumed UTI vs bacteriuria.   Now  colonized with ESBL E coli and VSE faecium.   The patient presented to ED with hematuria in the existing suprapubic catheter, without fever or other complaints.              Review of  Systems:   CONSTITUTIONAL:  No fevers or chills  EYES: negative for icterus  ENT:  negative for hearing loss, tinnitus and sore throat  RESPIRATORY:  negative for cough with sputum and dyspnea  CARDIOVASCULAR:  negative for chest pain, dyspnea  GASTROINTESTINAL:  negative for nausea, vomiting, diarrhea and constipation  GENITOURINARY:  negative for dysuria  HEME:  No easy bruising  INTEGUMENT:  negative for rash and pruritus  NEURO:  Negative for headache         Past Medical History:     Past Medical History:   Diagnosis Date    Atrial fibrillation (H)     Depression     Hypertension     Malignant neoplasm of endocervix (H)     Tx with radiation    Near syncope 11/7/2024    Orthopnea 9/21/2024    Other chronic pain     Low back    Schizophrenia (H)     Urinary incontinence             Past Surgical History:     Past Surgical History:   Procedure Laterality Date    ABDOMINOPLASTY      BIOPSY CERVICAL, LOCAL EXCISION, SINGLE/MULTIPLE  10/26/2011    Procedure:BIOPSY CERVICAL, LOCAL EXCISION, SINGLE/MULTIPLE; EUA, cervical biopsies; Surgeon:BETTY TINEO; Location:[facility] OR    BIOPSY VAGINAL N/A 06/08/2021    Procedure: BIOPSY, VAGINA;  Surgeon: Dany Perez MD;  Location: [facility]    CYSTOSCOPY N/A 05/17/2024    Procedure: Cystoscopy;  Surgeon: Dany Perez MD;  [facility]    CYSTOSTOMY, INSERT TUBE SUPRAPUBIC, COMBINED  07/12/2024    Procedure: Insertion of supra-pubic catheter;  Surgeon: Dany Perez MD;  [facility]    DILATION AND CURETTAGE, WITH ULTRASOUND GUIDANCE N/A 05/17/2024    Procedure: Dilation and curettage of the uterus with drainage of uterine fluid under ultrasound guidance, lysis of vaginal adhesions;  Surgeon: Dany Perez MD;  [facility]    EXAM UNDER ANESTHESIA PELVIC N/A 03/12/2020    Procedure: Exam under anesthsia, vaginal biopsies, possible CO2 laser of the vagina;  Surgeon: Lilliam Roy MD;  [facility]    GI SURGERY  2004    gastric bypass     HYSTERECTOMY TOTAL ABDOMINAL, BILATERAL SALPINGO-OOPHORECTOMY, COMBINED Bilateral 07/12/2024    Procedure: Diagnostic laparoscopy converted to exploratory laparotomy, total abdominal hysterectomy, bilateral salpingo-oophorectomy, lysis of adhesions >60 min;  Surgeon: Dany Perez MD;  [facility]    INSERT PORT VASCULAR ACCESS N/A 08/26/2024    Procedure: Insert port vascular access;  Surgeon: Avery Gregory MD;  [facility]    IR CHEST PORT PLACEMENT > 5 YRS OF AGE  08/26/2024    IR NEPHROSTOMY TUBE PLACEMENT LEFT  11/29/2024    LASER CO2 VAGINA N/A 03/02/2015    Procedure: LASER CO2 VAGINA;  Surgeon: Mariela Abdalla MD;  Location: [facility]    LASER CO2 VAGINA N/A 05/24/2019    Procedure: Exam under anesthesia, vaginal biopsies, CO2 laser of the vagina;  Surgeon: Lilliam Roy MD;  Location: [facility]    LASER CO2 VAGINA N/A 06/08/2021    Procedure: Exam under anesthesia, laser ablation of the upper vagina;  Surgeon: Dany Perez MD;  Location: [facility]    PICC DOUBLE LUMEN PLACEMENT Left 11/28/2024    left basilic 5 fr dl power picc 44 cm    REPAIR BLADDER N/A 07/12/2024    Procedure: Repair bladder;  Surgeon: Dany Perez MD;  [facility]            Family History:   Reviewed and non-contributory.   Family History   Problem Relation Age of Onset    Heart Disease Father     Heart Failure Father     No Known Problems Brother     No Known Problems Brother     No Known Problems Brother     Pancreatitis Brother     Hypertension Sister     Peripheral Vascular Disease Sister     No Known Problems Sister     No Known Problems Son     No Known Problems Daughter             Social History:     Social History     Tobacco Use    Smoking status: Never    Smokeless tobacco: Never   Substance Use Topics    Alcohol use: Not Currently     History   Sexual Activity    Sexual activity: Not on file            Current Medications:     Current Facility-Administered Medications   Medication Dose Route  Frequency Provider Last Rate Last Admin    apixaban ANTICOAGULANT (ELIQUIS) tablet 5 mg  5 mg Oral BID Vickie Purdy MD   5 mg at 01/26/25 0845    ertapenem (INVanz) 1 g vial to attach to  mL bag  1 g Intravenous Q24H Arya Veloz MD        oxyCODONE (ROXICODONE) tablet 5 mg  5 mg Oral Q8H Vickie Purdy MD   5 mg at 01/26/25 0845    polyethylene glycol (MIRALAX) Packet 17 g  17 g Oral Daily Arya Veloz MD   17 g at 01/26/25 0845    senna-docusate (SENOKOT-S/PERICOLACE) 8.6-50 MG per tablet 1 tablet  1 tablet Oral BID Vickie Purdy MD   1 tablet at 01/26/25 0845    sodium chloride (PF) 0.9% PF flush 3 mL  3 mL Intracatheter Q8H Vickie Purdy MD   3 mL at 01/26/25 0846            Allergies:     Allergies   Allergen Reactions    Ibuprofen Nausea and Vomiting    Shrimp     Sulfa Antibiotics Rash     Patient started on bactrim 7/24/24 tolerating medication without rash on 7/25             Physical Exam:   Vitals were reviewed  Patient Vitals for the past 24 hrs:   BP Temp Temp src Pulse Resp SpO2   01/26/25 0845 98/82 -- Oral -- 18 98 %   01/25/25 2000 105/81 97.7  F (36.5  C) Oral (!) 113 16 98 %       Physical Examination:  GENERAL:  well-developed, well-nourished, in bed in no acute distress.   LUNGS:  Clear to auscultation bilateral.   ABDOMEN:  Normal bowel sounds, soft, nontender. No appreciable hepatosplenomegaly  SKIN:  No acute rashes.  Line(s) are in place without any surrounding erythema or exudate. No stigmata of endocarditis. The suprapubic catheter site is intact without erythema.          Laboratory Data:     Inflammatory Markers  No lab results found.    Hematology Studies    Recent Labs   Lab Test 01/26/25  0830 01/25/25  1254 01/14/25  1237 12/12/24  1944 12/08/24  0347 12/07/24  1724 12/07/24  0906   WBC 8.9 10.2 5.4 4.0 4.4  --  6.4   HGB 9.2* 10.2* 9.4* 7.9* 7.7* 8.3* 6.4*   MCV 89 87 89 98 97  --  101*    282 173 229 150  --  163  "      Metabolic Studies     Recent Labs   Lab Test 01/26/25  0830 01/25/25  1254 01/14/25  1237 01/07/25  1113 12/12/24 2055 12/12/24 1944   * 134* 134*  --  142 141   POTASSIUM 3.4 3.2* 3.2*  --  3.6 3.7   CHLORIDE 100 101 101  --  113* 114*   CO2 23 23 24  --  22 21*   BUN 13.7 11.3 10.5  --  10.9 10.9   CR 1.03* 0.98* 0.85 0.83 0.71 0.72   GFRESTIMATED 58* 61 73 75 90 89       Hepatic Studies    Recent Labs   Lab Test 01/25/25  1254 01/14/25  1237 12/12/24 2055 12/12/24 1944 12/01/24  0402 11/27/24  0550 11/26/24  2250   BILITOTAL 0.7 0.3 0.2 0.2 <0.2 0.4 0.4   ALKPHOS 129 107 148 156* 92 141 134   ALBUMIN 2.3* 2.0* 2.1* 2.1* 2.5* 1.8* 1.8*   AST 19 44 14 16 17  --  26   ALT 14 15 21 18 12 19 15       Microbiology:  Culture Micro   Date Value Ref Range Status   01/18/2021 (A)  Final    50,000 to 100,000 colonies/mL  Enterobacter cloacae complex     02/28/2020 >100,000 colonies/mL  Escherichia coli   (A)  Final   02/28/2020 <10,000 colonies/mL  urogenital elisa    Final   06/03/2019 >100,000 colonies/mL  Escherichia coli   (A)  Final   04/09/2014   Final    <10,000 colonies/mL Mixed gram positive elisa  Multiple species present, probable perineal contamination.  Susceptibility testing not routinely done   09/23/2013 >100,000 colonies/mL Escherichia coli  Final   08/12/2013   Final    >100,000 colonies/mL Escherichia coli  10,000 to 50,000 colonies/mL Beta hemolytic Streptococcus group G   02/11/2013 >100,000 colonies/mL Escherichia coli  Final   02/29/2012 >100,000 colonies/mL Escherichia coli  Final       Urine Studies    Recent Labs   Lab Test 01/25/25  1512 11/29/24  2202 11/26/24  2335 11/07/24  1209 10/10/24  1049   LEUKEST Large* Small* Large* Large* Large*   WBCU >182* >182* >182* >182* >182*       Vancomycin Levels  No lab results found.    Invalid input(s): \"VANCO\"    Hepatitis B Testing No lab results found.  Hepatitis C Testing   No results found for: \"HCVAB\", \"HQTG\", \"HCGENO\", \"HCPCR\", " "\"HQTRNA\", \"HEPRNA\"  Respiratory Virus Testing    No results found for: \"RS\", \"FLUAG\"     "

## 2025-01-26 NOTE — CONSULTS
Care Management Initial Consult    General Information  Assessment completed with: Patient, Children, VM-chart review, Huy  Type of CM/SW Visit: Initial Assessment    Primary Care Provider verified and updated as needed: Yes   Readmission within the last 30 days: previous discharge plan unsuccessful   Return Category: New Diagnosis  Reason for Consult: discharge planning, utilization management concerns  Advance Care Planning: Advance Care Planning Reviewed: present on chart (HCD on file however not valid due to lacking a date)          Communication Assessment  Patient's communication style: spoken language (English or Bilingual)         Cognitive  Cognitive/Neuro/Behavioral: WDL                      Living Environment:   People in home: child(vernell), adult     Current living Arrangements: house      Able to return to prior arrangements: yes       Family/Social Support:  Care provided by: child(vernell), other (see comments) (niece, granddaughter, grandson)  Provides care for: no one, unable/limited ability to care for self  Marital Status: Single  Support system: Children, Other (specify) (other family)          Description of Support System: Supportive, Involved    Support Assessment: Adequate family and caregiver support    Current Resources:   Patient receiving home care services: Yes  Skilled Home Care Services: Skilled Nursing, Physical Therapy, Occupational Therapy  Community Resources: DME, Home Care, OP Mental Health  Equipment currently used at home: grab bar, toilet, grab bar, tub/shower, shower chair, walker, rolling, walker, standard, wheelchair, power  Supplies currently used at home: Incontinence Supplies    Employment/Financial:  Employment Status: retired     Financial Concerns: none   Referral to Financial Worker: No       Does the patient's insurance plan have a 3 day qualifying hospital stay waiver?  No    Lifestyle & Psychosocial Needs:  Social Drivers of Health     Food Insecurity: Low  Risk  (11/27/2024)    Food Insecurity     Within the past 12 months, did you worry that your food would run out before you got money to buy more?: No     Within the past 12 months, did the food you bought just not last and you didn t have money to get more?: No   Depression: Not at risk (9/4/2024)    PHQ-2     PHQ-2 Score: 0   Housing Stability: Low Risk  (11/27/2024)    Housing Stability     Do you have housing? : Yes     Are you worried about losing your housing?: No   Tobacco Use: Low Risk  (1/25/2025)    Patient History     Smoking Tobacco Use: Never     Smokeless Tobacco Use: Never     Passive Exposure: Not on file   Financial Resource Strain: Low Risk  (11/27/2024)    Financial Resource Strain     Within the past 12 months, have you or your family members you live with been unable to get utilities (heat, electricity) when it was really needed?: No   Alcohol Use: Not on file   Transportation Needs: Low Risk  (11/27/2024)    Transportation Needs     Within the past 12 months, has lack of transportation kept you from medical appointments, getting your medicines, non-medical meetings or appointments, work, or from getting things that you need?: No   Recent Concern: Transportation Needs - High Risk (9/21/2024)    Transportation Needs     Within the past 12 months, has lack of transportation kept you from medical appointments, getting your medicines, non-medical meetings or appointments, work, or from getting things that you need?: Yes   Physical Activity: Not on file   Interpersonal Safety: Low Risk  (1/7/2025)    Interpersonal Safety     Do you feel physically and emotionally safe where you currently live?: Yes     Within the past 12 months, have you been hit, slapped, kicked or otherwise physically hurt by someone?: No     Within the past 12 months, have you been humiliated or emotionally abused in other ways by your partner or ex-partner?: No   Stress: Not on file   Social Connections: Not on file   Health  "Literacy: Not on file       Functional Status:  Prior to admission patient needed assistance:   Dependent ADLs:: Ambulation-walker, Bathing, Positioning, Transfers, Toileting, Incontinence  Dependent IADLs:: Cleaning, Cooking, Laundry, Transportation, Meal Preparation  Assesssment of Functional Status: Not at baseline with ADL Functioning    Mental Health Status:  Mental Health Status: Current Concern (altered mental status)       Chemical Dependency Status:  Chemical Dependency Status: No Current Concerns             Values/Beliefs:  Spiritual, Cultural Beliefs, Sikhism Practices, Values that affect care: no               Discussed  Partnership in Safe Discharge Planning  document with patient/family: No    Additional Information:    Care Management Assessment completed due to elevated risk score. CMA completed with both patient and her daughter, Joy.    Joy tells SW that patient returned home after a 30+ day stay at [facility]. Joy says patient's experience was \"awful\" and she \"did not accomplish much.\" Joy says the family is managing patient's care well at home, however it can be difficult to manage her change in mental status.    SW confirmed current DME with patient  who mentioned her PCP encouraging her to get a power wheelchair. Patient says she needs assistance with most of her I/ADLs but is able to dress, groom, and eat independently.     Both patient and Joy confirmed patient being open with [facility].     Family can provide patient with a ride home if that is the anticipates discharge plan.    Discharge resources:  [facility] (Phone: [phone])     Next Steps:   - send LYDIA orders to [facility]  - hospitals    WILLIAM Patel   1/26/2025       Social Work and Care Management Department       SEARCHABLE in [facility] - search SOCIAL WORK       [place] ([phone]) Saturday and Sunday     Units: 4A Vocera, 4C Vocera, & 4E Vocera    "     Units: 5A [id number] Vocera, 5A 2812-1337 Vocera , BMT SW 1 BMT SW 2, BMT SW 3 & BMT SW 4  5C Off Service 5401 - [phone]  5C Off Service [phone]     Units: 6A Vocera & 6B Vocera      Units: 6C Vocera     Units: 7A Vocera & 7B Vocera      Units: 7C Med Surg 7401 thru 7418 and 7C Med Surg 7502 thru 7521      Unit: [place] ED Vocera & [place] Obs Vocera     [facility] ([phone]) Saturday and Sunday      Units: 5 Ortho Vocera, 5 Med Surg Vocera & WB ED Vocera     Units: 6 Med Surg Vocera, 8 Med Surg Vocera, & 10 ICU Vocera      After hours Vocera [facility] and After Hours Vocera [place]     Please NOTE changes to times below:    **Saturday & Sunday (1630 - 2030)    **Mon-Fri ([phone])     **[facility] Recognized Holidays  ([phone])    Units: ALL   - see above VOCERA links to units

## 2025-01-26 NOTE — PROGRESS NOTES
"ED PT Eval     01/26/25 1500   Appointment Info   Signing Clinician's Name / Credentials (PT) Blake Jade, PT, DPTKollannVickie Almonte MD   Living Environment   People in Home grandchild(vernell);child(vernell), adult   Current Living Arrangements house   Home Accessibility stairs to enter home   Number of Stairs, Main Entrance 6   Stair Railings, Main Entrance other (see comments)  (denies handrail)   Transportation Anticipated family or friend will provide   Living Environment Comments Recently returned home from TCU. Endorses 24/7 family support and all needs met on main level of home.   Self-Care   Usual Activity Tolerance fair   Current Activity Tolerance poor   Regular Exercise No   Equipment Currently Used at Home grab bar, toilet;grab bar, tub/shower;shower chair;walker, rolling;walker, standard;wheelchair, power   Activity/Exercise/Self-Care Comment Patient states that she was walking short distances after TCU discharge but was still heavily dependent on family. Has WC and states that she has had to use this in the house from time to time.   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\"   Existing Precautions/Restrictions fall   General Observations activity: up with assist   Cognition   Affect/Mental Status (Cognition) anxious   Cognitive Status Comments patient intermittently crying throughout the session   Range of Motion (ROM)   Range of Motion ROM is WFL "   Strength (Manual Muscle Testing)   Strength (Manual Muscle Testing) Deficits observed during functional mobility   Strength Comments grossly deconditioned   Bed Mobility   Bed Mobility sit-supine;supine-sit   Supine-Sit [employer] (Bed Mobility) modified independence   Sit-Supine [employer] (Bed Mobility) moderate assist (50% patient effort)   Comment, (Bed Mobility) mod I supine>sit due to elevated HOB   Transfers   Transfers sit-stand transfer   Sit-Stand Transfer   Sit-Stand [employer] (Transfers) minimum assist (75% patient effort)   Assistive Device (Sit-Stand Transfers) walker, front-wheeled   Comment, (Sit-Stand Transfer) with HOB elevated   Gait/Stairs (Locomotion)   [employer] Level (Gait) moderate assist (50% patient effort)   Assistive Device (Gait) walker, front-wheeled   Comment, (Gait/Stairs) mod A for steadying support and body weight support to prevent fall after ~2 steps   Balance   Balance Comments IND sitting EOB, CGA static stance at FWW   Clinical Impression   Criteria for Skilled Therapeutic Intervention Yes, treatment indicated   PT Diagnosis (PT) impaired functional mobility   Influenced by the following impairments decreased activity tolerance, functional weakness   Functional limitations due to impairments transfers, gait, stairs   Clinical Presentation (PT Evaluation Complexity) stable   Clinical Presentation Rationale clinical judgement   Clinical Decision Making (Complexity) low complexity   Planned Therapy Interventions (PT) balance training;bed mobility training;gait training;ROM (range of motion);stair training;strengthening;stretching;transfer training   Risk & Benefits of therapy have been explained evaluation/treatment results reviewed;care plan/treatment goals reviewed;risks/benefits reviewed;current/potential barriers reviewed;participants voiced agreement with care plan;participants included;patient   PT Total Evaluation Time   PT Eval, Low Complexity Minutes (28595) 8    Physical Therapy Goals   PT Frequency 5x/week   PT Predicted Duration/Target Date for Goal Attainment 02/09/25   PT Goals Bed Mobility;Transfers;Gait;Stairs   PT: Bed Mobility Modified independent;Supine to/from sit   PT: Transfers Modified independent;Sit to/from stand;Assistive device   PT: Gait Modified independent;Assistive device;10 feet   PT: Stairs 5 stairs;Completed  (Ax1)   Interventions   Interventions Quick Adds Gait Training;Therapeutic Activity   PT Discharge Planning   PT Plan sit<>stands, gait with chair follow   PT Discharge Recommendation (DC Rec) home with assist;Transitional Care Facility   PT Rationale for DC Rec At this time patient demonstrates greatly impaired mobility and would likely benefit from TCU placement. That being said, patient very anxious about returning to TCU and firmly declines. If patient is to return home we will need to perform caregiver training to help her ascend stairs into home and she will need 24 hour assist at home.   PT Brief overview of current status Ax1 for pivot transfers with gait belt and FWW, needs chair follow for ambulation   PT Total Distance Amb During Session (feet) 3   Physical Therapy Time and Intention   Total Session Time (sum of timed and untimed services) 8       Blake Jade, PT, DPT

## 2025-01-27 VITALS
HEIGHT: 63 IN | WEIGHT: 156 LBS | RESPIRATION RATE: 16 BRPM | TEMPERATURE: 97.4 F | SYSTOLIC BLOOD PRESSURE: 117 MMHG | OXYGEN SATURATION: 100 % | DIASTOLIC BLOOD PRESSURE: 81 MMHG | BODY MASS INDEX: 27.64 KG/M2 | HEART RATE: 91 BPM

## 2025-01-27 LAB
ANION GAP SERPL CALCULATED.3IONS-SCNC: 8 MMOL/L (ref 7–15)
BUN SERPL-MCNC: 15.1 MG/DL (ref 8–23)
CALCIUM SERPL-MCNC: 8.1 MG/DL (ref 8.8–10.4)
CHLORIDE SERPL-SCNC: 100 MMOL/L (ref 98–107)
CREAT SERPL-MCNC: 1.08 MG/DL (ref 0.51–0.95)
EGFRCR SERPLBLD CKD-EPI 2021: 55 ML/MIN/1.73M2
ERYTHROCYTE [DISTWIDTH] IN BLOOD BY AUTOMATED COUNT: 16.1 % (ref 10–15)
GLUCOSE SERPL-MCNC: 77 MG/DL (ref 70–99)
HCO3 SERPL-SCNC: 23 MMOL/L (ref 22–29)
HCT VFR BLD AUTO: 26.7 % (ref 35–47)
HGB BLD-MCNC: 8.7 G/DL (ref 11.7–15.7)
MCH RBC QN AUTO: 28.3 PG (ref 26.5–33)
MCHC RBC AUTO-ENTMCNC: 32.6 G/DL (ref 31.5–36.5)
MCV RBC AUTO: 87 FL (ref 78–100)
PHOSPHATE SERPL-MCNC: 2.7 MG/DL (ref 2.5–4.5)
PLATELET # BLD AUTO: 219 10E3/UL (ref 150–450)
POTASSIUM SERPL-SCNC: 3.2 MMOL/L (ref 3.4–5.3)
POTASSIUM SERPL-SCNC: 3.3 MMOL/L (ref 3.4–5.3)
RBC # BLD AUTO: 3.07 10E6/UL (ref 3.8–5.2)
SODIUM SERPL-SCNC: 131 MMOL/L (ref 135–145)
WBC # BLD AUTO: 6.4 10E3/UL (ref 4–11)

## 2025-01-27 PROCEDURE — 97530 THERAPEUTIC ACTIVITIES: CPT | Mod: GP

## 2025-01-27 PROCEDURE — G0463 HOSPITAL OUTPT CLINIC VISIT: HCPCS

## 2025-01-27 PROCEDURE — 97165 OT EVAL LOW COMPLEX 30 MIN: CPT | Mod: GO

## 2025-01-27 PROCEDURE — 99239 HOSP IP/OBS DSCHRG MGMT >30: CPT | Mod: GC | Performed by: STUDENT IN AN ORGANIZED HEALTH CARE EDUCATION/TRAINING PROGRAM

## 2025-01-27 PROCEDURE — 84100 ASSAY OF PHOSPHORUS: CPT

## 2025-01-27 PROCEDURE — 85027 COMPLETE CBC AUTOMATED: CPT

## 2025-01-27 PROCEDURE — 250N000013 HC RX MED GY IP 250 OP 250 PS 637

## 2025-01-27 PROCEDURE — 97535 SELF CARE MNGMENT TRAINING: CPT | Mod: GO

## 2025-01-27 PROCEDURE — 84295 ASSAY OF SERUM SODIUM: CPT

## 2025-01-27 PROCEDURE — 250N000011 HC RX IP 250 OP 636: Performed by: STUDENT IN AN ORGANIZED HEALTH CARE EDUCATION/TRAINING PROGRAM

## 2025-01-27 PROCEDURE — 250N000011 HC RX IP 250 OP 636

## 2025-01-27 PROCEDURE — 250N000013 HC RX MED GY IP 250 OP 250 PS 637: Performed by: STUDENT IN AN ORGANIZED HEALTH CARE EDUCATION/TRAINING PROGRAM

## 2025-01-27 PROCEDURE — 97530 THERAPEUTIC ACTIVITIES: CPT | Mod: GO

## 2025-01-27 PROCEDURE — 84132 ASSAY OF SERUM POTASSIUM: CPT

## 2025-01-27 PROCEDURE — 80048 BASIC METABOLIC PNL TOTAL CA: CPT

## 2025-01-27 PROCEDURE — 97140 MANUAL THERAPY 1/> REGIONS: CPT | Mod: GO

## 2025-01-27 RX ORDER — SODIUM PHOSPHATE,MONO-DIBASIC 19G-7G/118
1 ENEMA (ML) RECTAL
Status: CANCELLED | OUTPATIENT
Start: 2025-01-27

## 2025-01-27 RX ORDER — POLYETHYLENE GLYCOL 3350 17 G/17G
17 POWDER, FOR SOLUTION ORAL 2 TIMES DAILY
Status: DISCONTINUED | OUTPATIENT
Start: 2025-01-27 | End: 2025-01-28 | Stop reason: HOSPADM

## 2025-01-27 RX ORDER — POLYETHYLENE GLYCOL 3350 17 G/17G
17 POWDER, FOR SOLUTION ORAL 2 TIMES DAILY
Status: CANCELLED | OUTPATIENT
Start: 2025-01-27

## 2025-01-27 RX ORDER — POTASSIUM CHLORIDE 1.5 G/1.58G
40 POWDER, FOR SOLUTION ORAL ONCE
Status: COMPLETED | OUTPATIENT
Start: 2025-01-27 | End: 2025-01-27

## 2025-01-27 RX ORDER — HEPARIN SODIUM,PORCINE 10 UNIT/ML
5-10 VIAL (ML) INTRAVENOUS
Status: DISCONTINUED | OUTPATIENT
Start: 2025-01-27 | End: 2025-01-28 | Stop reason: HOSPADM

## 2025-01-27 RX ORDER — HEPARIN SODIUM,PORCINE 10 UNIT/ML
5-10 VIAL (ML) INTRAVENOUS EVERY 24 HOURS
Status: DISCONTINUED | OUTPATIENT
Start: 2025-01-27 | End: 2025-01-28 | Stop reason: HOSPADM

## 2025-01-27 RX ORDER — HEPARIN SODIUM (PORCINE) LOCK FLUSH IV SOLN 100 UNIT/ML 100 UNIT/ML
5-10 SOLUTION INTRAVENOUS
Status: DISCONTINUED | OUTPATIENT
Start: 2025-01-27 | End: 2025-01-28 | Stop reason: HOSPADM

## 2025-01-27 RX ADMIN — POTASSIUM CHLORIDE 40 MEQ: 1.5 POWDER, FOR SOLUTION ORAL at 08:30

## 2025-01-27 RX ADMIN — HEPARIN 5 ML: 100 SYRINGE at 20:31

## 2025-01-27 RX ADMIN — POTASSIUM CHLORIDE 40 MEQ: 1.5 POWDER, FOR SOLUTION ORAL at 12:16

## 2025-01-27 RX ADMIN — ONDANSETRON 4 MG: 2 INJECTION INTRAMUSCULAR; INTRAVENOUS at 15:59

## 2025-01-27 RX ADMIN — APIXABAN 5 MG: 5 TABLET, FILM COATED ORAL at 20:04

## 2025-01-27 RX ADMIN — OXYCODONE HYDROCHLORIDE 5 MG: 5 TABLET ORAL at 01:14

## 2025-01-27 RX ADMIN — SENNOSIDES AND DOCUSATE SODIUM 1 TABLET: 50; 8.6 TABLET ORAL at 20:04

## 2025-01-27 RX ADMIN — OXYCODONE HYDROCHLORIDE 5 MG: 5 TABLET ORAL at 17:02

## 2025-01-27 RX ADMIN — Medication 3 MG: at 00:03

## 2025-01-27 RX ADMIN — SENNOSIDES AND DOCUSATE SODIUM 1 TABLET: 50; 8.6 TABLET ORAL at 08:30

## 2025-01-27 RX ADMIN — APIXABAN 5 MG: 5 TABLET, FILM COATED ORAL at 08:30

## 2025-01-27 RX ADMIN — ACETAMINOPHEN 650 MG: 325 TABLET, FILM COATED ORAL at 00:03

## 2025-01-27 RX ADMIN — OXYCODONE HYDROCHLORIDE 5 MG: 5 TABLET ORAL at 08:30

## 2025-01-27 ASSESSMENT — ACTIVITIES OF DAILY LIVING (ADL)
ADLS_ACUITY_SCORE: 61
ADLS_ACUITY_SCORE: 62
ADLS_ACUITY_SCORE: 61
ADLS_ACUITY_SCORE: 62
ADLS_ACUITY_SCORE: 61
ADLS_ACUITY_SCORE: 69
ADLS_ACUITY_SCORE: 62
ADLS_ACUITY_SCORE: 61

## 2025-01-27 NOTE — PROGRESS NOTES
Care Management Discharge Note    Discharge Date: 01/27/2025       Discharge Disposition: Home, Home Care    [facility] (Phone: [phone])       Discharge Services: Home Care-RN/PT/OT/HHA    Discharge DME: None    Discharge Transportation: family or friend will provide    Private pay costs discussed: Not applicable    Does the patient's insurance plan have a 3 day qualifying hospital stay waiver?  No    PAS Confirmation Code:    Patient/family educated on Medicare website which has current facility and service quality ratings: no    Education Provided on the Discharge Plan: No  Persons Notified of Discharge Plans: Provider  Patient/Family in Agreement with the Plan: yes    Handoff Referral Completed: No, handoff not indicated or clinically appropriate    Additional Information:  RNCC was notified that patient was ready to discharge.  RNCC called [facility] to confirm services with them.  RNCC spoke with Danial at [facility] and he states that patient is established with RN/PT/OT/HHA services. Danial states that start of Care will be on Thursday for patient. RNCC placed LYDIA order and notified provider.      Ella Bermudez, SHOAIB    Nurse Coordinator     Covering for: [employer]    Phone: *[phone]    Social Work and Care Management Department    SEARCHABLE in [facility] - search CARE COORDINATOR    [place] ([phone]) Saturday & Sunday; ([phone]) [facility] Recognized Holidays    Units: 5A, 5B & 5C  Pager: [phone]    Units: 6B, 6C & 6D    Pager: [phone]    Units: 7A, 7B, 7C & 7D    Pager: [phone]    Units: 6A & ICU   Pager: [phone]    Units: 5 Ortho, 5MS & WB ED Pager: [phone]    Units: 6MS, 8A & 10 ICU  Pager [phone]

## 2025-01-27 NOTE — PROGRESS NOTES
01/27/25 1400   Appointment Info   Signing Clinician's Name / Credentials (OT) Padmini Rouse, OTR/L   Living Environment   People in Home grandchild(vernell);child(vernell), adult   Current Living Arrangements house   Home Accessibility stairs to enter home   Number of Stairs, Main Entrance 6   Transportation Anticipated family or friend will provide   Living Environment Comments Recently returned home from TCU. Pt lives in a duplex with grandson and grandaughter. Endorses 24/7 family support and all needs met on main level of home. Pt has a shower chair and a tub/shower combo.   Self-Care   Usual Activity Tolerance fair   Current Activity Tolerance poor   Equipment Currently Used at Home grab bar, toilet;grab bar, tub/shower;shower chair;walker, rolling;walker, standard;wheelchair, power   Activity/Exercise/Self-Care Comment Patient states that she was walking short distances after TCU discharge but was still heavily dependent on family. Has WC and states that she has had to use this in the house from time to alex   General Information   Onset of Illness/Injury or Date of Surgery 01/25/25   Existing Precautions/Restrictions fall   Limitations/Impairments safety/cognitive   Left Upper Extremity (Weight-bearing Status) full weight-bearing (FWB)   Right Upper Extremity (Weight-bearing Status) full weight-bearing (FWB)   Left Lower Extremity (Weight-bearing Status) full weight-bearing (FWB)   Right Lower Extremity (Weight-bearing Status) full weight-bearing (FWB)   Visual Perception   Visual Impairment/Limitations WFL   Posture   Posture forward head position   Strength Comprehensive (MMT)   Comment, General Manual Muscle Testing (MMT) Assessment general deconditioning   Clinical Impression   Criteria for Skilled Therapeutic Interventions Met (OT) Yes, treatment indicated   OT Diagnosis Pt below baseline with ADLs/IADLs   OT Problem List-Impairments impacting ADL problems related to;activity tolerance  impaired;balance;mobility;range of motion (ROM);strength   Planned Therapy Interventions (OT) ADL retraining;IADL retraining;strengthening;transfer training   Clinical Decision Making Complexity (OT) problem focused assessment/low complexity   Risk & Benefits of therapy have been explained evaluation/treatment results reviewed;care plan/treatment goals reviewed;risks/benefits reviewed;current/potential barriers reviewed;participants voiced agreement with care plan;participants included;patient   Clinical Impression Comments 70 y/o F w/ PMH cervical cancer s/p radiation, 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 Pt would benefit from skilled OT services to return to PLOF with ADL/IADL independence.   OT Total Evaluation Time   OT Eval, Low Complexity Minutes (80183) 5   OT Goals   Therapy Frequency (OT) 5 times/week   OT Predicted Duration/Target Date for Goal Attainment 02/12/25   OT Goals Hygiene/Grooming;Lower Body Dressing;Upper Body Dressing;Bed Mobility;Toilet Transfer/Toileting   OT: Hygiene/Grooming modified independent   OT: Upper Body Dressing Modified independent   OT: Lower Body Dressing Minimal assist   OT: Bed Mobility Supervision/stand-by assist   OT: Toilet Transfer/Toileting Supervision/stand-by assist   Self-Care/Home Management   Self-Care/Home Mgmt/ADL, Compensatory, Meal Prep Minutes (78031) 8   Treatment Detail/Skilled Intervention OT eval complete and treatment initiated. Pt greeted supine in bed and endorsing not feeling well.Provided therapeautic listening. Pt agreeable to completing g/h after set up A. Pt required max A to don socks.   Therapeutic Activities   Therapeutic Activity Minutes (76271) 10   Treatment Detail/Skilled Intervention Mod A and VCs for bed mobility supine to  seated EOB. Pt endorsing soreness on bottom. Pt required mod Ax2 for STS with VCs for set up. Pt required mod A for SPT from bed<>recliner chair. Pt required mod A to scoot hips to back of chair. Pt left seated in recliner chair with BLE elevated, CL in reach, and all current needs met. Extra time for providing pt comfort.   OT Discharge Planning   OT Discharge Recommendation (DC Rec) Transitional Care Facility   OT Rationale for DC Rec Pt would benefit from TCU to improve strength and endurance with ADLs. Pt does have supportive family with 24/7 assist available at home. Pt reporting she would prefer to d/c home with A from family and HC services.   OT Brief overview of current status Mod ax2 SPT   Total Session Time   Timed Code Treatment Minutes 18   Total Session Time (sum of timed and untimed services) 23

## 2025-01-27 NOTE — PROGRESS NOTES
Dressing to suprapubic catheter is cleaned and dressed with sterile gauze.  Pt has been clinically stable all day . She tolerating food and fluid with no GI symptoms. Please continue with current POC.   1859: Pt had 300 ml of UO via her suprapubic catheter during the last 12 hrs.. MD is notified and plan to follow up with justa CRAMER

## 2025-01-27 NOTE — PROGRESS NOTES
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     Today:  -        #UTI  #Hx of ESBL urosepsis and bacteremia  #Bladder dysfunction s/p cystostomy and suprapubic catheter  Recently admitted 11/26 - 12/8/2024 for ESBL urosepsis and bacteremia requiring ICU w/ L nephrostomy tube (removed 1/7) treated w/ ertapenem. Scheduled to have suprapubic cath changed 1/15, but missed apt, subsequently presented w/ c/f UTI from family. Catheter exchanged in ED w/ post exchange US dirty and w/ blood. Bcx, Ucx pending. Started on ertapenem, ID consulted given ESBL hx, urology consult deferred given no further hematuria.   - Continue ertapenem  - Follow cultures  - ID consult  - WOC for suprapubic catheter    #Constipation  - Scheduled daily miralax  - Scheduled senna    #CKD  Cystatin C of 1.6, Cystatin calculated GFR of 37. Appears to be at baseline.   - Monitor     #Afib  - Continue PTA eliquis     #Hx Kiko-en-Y gastric bypass  #Hypophosphatemia  #Hypokalemia  #Hyponatremia  - Monitor electrolytes, replace as indicated  - Soft and bite sized diet     #Chronic bilateral lower extremity edema  #Deconditioning  Hx of chronic edema, no PTA diuretics.  - Lymphedema consult  - PT/OT      #MDD  #Schizophrenia  Psych consulted during recent admission and stopped all previous psychiatric meds 2/2 c/f over sedation. Family opposed to  "any psychiatric meds.     #Chronic abdominal pain  In s/o cancer and multiple prior abdominal surgeries w/ chronic suprapubic catheter.  - Continue PTA oxycodone prn     #Serous endometrial carcinoma  Follows w/ heme/onc through [place]. S/p SNEHA, BSO 07/2024 s/p cycle 3 carbo/taxel 10/15/2024, cycle 4 delayed in s/o recent admission, family ultimately decided to forgo any further treatment.      #HTN  Atenolol stopped during recent admission 2/2 c/f chronotropic incompetence.     #Chronic anemia, stable  Likely 2/2 renal disease. Hb stable, no evidence of bleeding s/p catheter exchange.  - Monitor        Diet: Soft & Bite Sized Diet (level 6) Thin Liquids (level 0)    DVT Prophylaxis: DOAC  Shahid Catheter: Not present  Fluids: ***  Lines: PRESENT    { TIP  Link to Avatar to review     :61851}  Port a Cath 11/07/24 Single Lumen Right Chest wall-Site Assessment: WDL      Cardiac Monitoring: None  Code Status: Full Code      Clinically Significant Risk Factors   { TIP  Diagnoses will continue to appear throughout the admission.  :38972}     # Hypokalemia: Lowest K = 3.2 mmol/L in last 2 days, will replace as needed  # Hyponatremia: Lowest Na = 131 mmol/L in last 2 days, will monitor as appropriate       # Hypoalbuminemia: Lowest albumin = 2.3 g/dL at 1/25/2025 12:54 PM, will monitor as appropriate     # Hypertension: Noted on problem list            # Overweight: Estimated body mass index is 27.63 kg/m  as calculated from the following:    Height as of this encounter: 1.6 m (5' 3\").    Weight as of this encounter: 70.8 kg (156 lb)., PRESENT ON ADMISSION     # Financial/Environmental Concerns: none         Social Drivers of Health          Disposition Plan   {TIP  It is advised to update the Medical Readiness for Discharge [MRD] daily, until the patient is 'Ready Now.' Last Documentation- Anticipated in 2-4 Days  . Use the SmartList below to update for today:760451}  Medically Ready for Discharge: {TIME; MEDICALLY " READY FOR DISCHARGE:[id number]}         The patient's care was discussed with the Attending Physician, Dr. Blair .    Delilah Akinsaram  Medical Student  Medicine Service, [facility] TEAM 2  [facility]  Securely message with [facility] [employer] (more info)  Text page via [facility] Paging/Directory   See signed in provider for up to date coverage information  ______________________________________________________________________    Interval History   Pt tearful during beginning of interview, reporting increased buttock pain, from sores. She is also bothered by dry skin and feeling very itchy. She also is frustrated by lack of bowel movement and would like to be able to poop.     Physical Exam   Vital Signs: Temp: 97.3  F (36.3  C) Temp src: Oral BP: 115/86 Pulse: 88   Resp: 18 SpO2: 100 % O2 Device: None (Room air)    Weight: 156 lbs 0 oz    {Recommend personal SmartPhrase or Notewriter for exam (OPTIONAL)   :121781}    Medical Decision Making   { TIP   MDM Calculator    MDM grid (w/ times)    Coding Support Chat  Billing is now based on time OR medical decision making complexity. Medical decision making included in the A&P does NOT need to be re-documented. Documented time is for the billing provider only (not including resident/fellow time).    :25808}    {Medical Decision Making (OPTIONAL)   :913974}      Data   {INSERT LABS/IMAGING (OPTIONAL)   :844013}

## 2025-01-27 NOTE — PROGRESS NOTES
01/27/25 1200   Appointment Info   Signing Clinician's Name / Credentials (OT) Sukhjinderflorence Neha OTR/L   Rehab Comments (OT) edema eval   Quick Adds   Quick Adds Edema   Edema General Information   Onset of Edema   (chronic)   Affected Body Part(s) Left LE;Right LE   Edema Etiology Unknown   Etiology Comments No official cause of chronic edema per chart review, low albumin, decreased muscle pump activation.   Edema Precaution Comments No known precautions.   General Comments/Previous Edema Treatment/Edema Equipment Pt familiar with edema services from previous hospital admissions.   Edema Examination/Assessment   Skin Condition Pitting;Dryness;Intact   Skin Condition Comments Skin intact but dry, shiny, and taut with 2+ pitting edema present from MTPs to knee creases, knees and thighs without evidence of swelling and skin pliable. Feet and ankles bulbous appearing.   Scar No   Ulcerations No   Clinical Impression   Criteria for Skilled Therapeutic Interventions Met (OT) Yes, treatment indicated   OT Diagnosis Increased BLE edema impacting optimal functional mobility and ADL performance.   Edema: Patient Presentation Edema   Edema: Planned Interventions Gradient compression bandaging;Fit for compression garment   Clinical Decision Making Complexity (OT) problem focused assessment/low complexity   Risk & Benefits of therapy have been explained evaluation/treatment results reviewed;care plan/treatment goals reviewed;risks/benefits reviewed;current/potential barriers reviewed;participants voiced agreement with care plan;participants included;patient   Clinical Impression Comments Pt to benefit from skilled edema services while IP to address the above mentioned problem list.   OT Total Evaluation Time   OT Eval, Low Complexity Minutes (27137) 3   OT Goals   Therapy Frequency (OT) 4 times/week  (edema)   OT Predicted Duration/Target Date for Goal Attainment 02/11/25   OT: Edema education to increase ability to manage edema  "after discharge from the hospital Patient;Caregiver;Demonstrate;[place];Skin care routine;signs/symptoms of intolerance;wear schedule;limb positioning;garrnet/bandage care;discharge recommendations   OT: Management of edema bandages Patient;Caregiver;Demonstrate;[place];quick wrap;garment(s)   Manual Therapy   Manual Therapy: Mobilization, MFR, MLD, friction massage minutes (21123) 39   Symptoms noted during/after treatment fatigue   Treatment Detail/Skilled Intervention Edema: Pt sitting upright in bed upon arrival, agreeable to therapy session. Edema evaluation completed and discussed POC. Education provided on physical anatomy, disease process, precautions, and treatment options. Skin cares completed with S[place] 24. Skin intact but dry, shiny, and taut with 2+ pitting edema present from MTPs to knee creases, knees and thighs without evidence of swelling and skin pliable. Feet and ankles bulbous appearing. Applied GCB from MTP to knee creases using \"quick wrap technique\" for management of edema and protection of skin integrity to promote independence with functional mobility and ADLs. Patient reported comfort with fit and reviewed wear schedule. Encouraged pt to wear wraps for no longer than 48 hours and remove if numbness/tingling/soiling occurs. Pt needed assistance with repositioning 2/2 significant pain on bottom. Pt needed maxA x2 for boost up in bed, Charly  for rolling from side to side 4x to place pillows. Additional time required to ensure comfort and offload pressure. Pt was left in bed with all needs met and MD team present.   OT Discharge Planning   OT Plan Edema: New G2 (1/3)   Total Session Time   Timed Code Treatment Minutes 39   Total Session Time (sum of timed and untimed services) 42       "

## 2025-01-27 NOTE — DISCHARGE SUMMARY
"[facility]  Discharge Summary - Medicine & Pediatrics       Date of Admission:  1/25/2025  Date of Discharge:  1/27/2025  Discharging Provider: Dr. Blair  Discharge Service: Medicine Service, [facility]HOG[facility] TEAM 2    Discharge Diagnoses   #Pyuria and hematuria with indwelling suprapubic catheter  #Constipation  #Pressure sores    Clinically Significant Risk Factors     # Overweight: Estimated body mass index is 27.63 kg/m  as calculated from the following:    Height as of this encounter: 1.6 m (5' 3\").    Weight as of this encounter: 70.8 kg (156 lb).       Follow-ups Needed After Discharge   Please follow-up with your primary care provider, Maria Fernanda Eckert, within 7 days for hospital follow-up.   Upcoming Appointments:   - 1/29/25 Urology Appointment  - 2/7/25 Urology Appointment  - 2/13/25 Cardiology Appointment    Unresulted Labs Ordered in the Past 30 Days of this Admission       Date and Time Order Name Status Description    1/25/2025  4:46 PM Blood Culture Arm, Right Preliminary     1/25/2025  4:46 PM Blood Culture Arm, Left Preliminary     1/25/2025  4:02 PM Urine Culture Preliminary         These results will be followed up by PCP.     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     #Hematuria, Pyuria  #Hx of ESBL urosepsis and bacteremia  #Bladder dysfunction s/p cystostomy and suprapubic catheter  Recently admitted 11/26 - 12/8/2024 for ESBL urosepsis and bacteremia requiring ICU w/ L nephrostomy tube (removed 1/7) treated w/ " ertapenem. Scheduled to have suprapubic cath changed 1/15, but missed apt, subsequently presented w/ c/f UTI from family. Catheter exchanged in ED w/ post exchange US dirty and w/ blood, Ucx w/ staph aureus, Bcx NGTD. Started on Ertapenem, ID consulted who recommended against further abx as catheter exchange was sufficient with low concern for active infection given afebrile, no leukocytosis, clinically stable thus stopped abx. Urology consult deferred given no further hematuria. ID recommended avoiding frequent UA and Ucx checks in future unless patient w/ symptoms of UTI. She should follow up w/ urology as scheduled for catheter exchange.     #Constipation  Received Miralax and senna w/o BM, eventual BM w/ tap water enema on day of discharge. Continue senna at home w/ miralax prn.    #Sacral pressure sore  WOC consulted, recommended position changes, mepilex dressing, ostomy powder, film barrier. Continue frequent position changes at home.    Chronic/stable:  #Atrial fibrillation - Continued PTA apixaban  #Chronic abdominal pain - Continued PTA oxycodone    #Hx Kiko-en-Y gastric bypass  #Hypophosphatemia  #Hypokalemia  #Hyponatremia  Lytes monitored and replaced as indicated.    #CKD  Cystatin C of 1.6, Cystatin calculated GFR of 37. Appeared to be at baseline.      #Chronic bilateral lower extremity edema  #Deconditioning  Hx of chronic edema, no PTA diuretics. Physical and occupational therapy were consulted during this admission, and gradient compression bandaging applied. Initial recommendation was to transitional care unit. However, agree that per patient preference and for her mental health, discharged to home with home care and family support in place.       #Serous endometrial carcinoma  Follows w/ heme/onc through [facility]. S/p SNEHA, BSO 07/2024 s/p cycle 3 carbo/taxel 10/15/2024, cycle 4 delayed in s/o recent admission, family ultimately decided to forgo any further treatment.     #Chronic anemia,  stable  Likely 2/2 renal disease. Hb stable, no evidence of bleeding s/p catheter exchange.      Consultations This Hospital Stay   PHYSICAL THERAPY ADULT IP CONSULT  OCCUPATIONAL THERAPY ADULT IP CONSULT  INFECTIOUS DISEASE GENERAL ADULT IP CONSULT  LYMPHEDEMA THERAPY IP CONSULT  CARE MANAGEMENT / SOCIAL WORK IP CONSULT  WOUND OSTOMY CONTINENCE NURSE  IP CONSULT    Code Status   Full Code       The patient was discussed with MD Nicole Coy [address] 5A ONCOLOGY  [address]  [place] [address]  Phone: [phone]  ______________________________________________________________________    Physical Exam   Vital Signs: Temp: 97.6  F (36.4  C) Temp src: Oral BP: 115/73 Pulse: 92   Resp: 18 SpO2: 98 % O2 Device: None (Room air)    Weight: 156 lbs 0 oz  General Appearance: Pt teary-eyed, sitting up in bed, conversant with staff.   Respiratory: No respiratory distress, lungs clear to auscultation bilaterally.  Cardiovascular: Regular rate and rhythm, no appreciable murmurs, rubs, gallops. 2+ pitting edema in BLE.   GI: Soft, nondistended, nontender to palpation. Suprapubic catheter in place with clean gauze dressing around tube site.   Skin: No rashes on visible skin.       Primary Care Physician   Maria Fernanda Eckert    Discharge Orders      Resume Home Care Services    Resumption orders through [facility] for RN/PT/OT/HHA     Reason for your hospital stay    You were admitted to the hospital and your suprapubic catheter was exchanged. You were briefly treated with antibiotics, but it's unlikely that you had a UTI so the antibiotics were discontinued. Please follow up with your primary care provider within  two weeks and ensure your catheter is exchanged as scheduled.     Activity    Your activity upon discharge: activity as tolerated     Adult [facility] Follow-up and recommended labs and tests    Follow up with primary care provider, Maria Fernanda Eckert, within 7 days for hospital follow-  up.        Appointments on [place] and/or [facility] (with [facility] or [facility] provider or service). Call [phone] if you haven't heard regarding these appointments within 7 days of discharge.     Resume Home Care Services     Diet    Follow this diet upon discharge: Current Diet:Orders Placed This Encounter      Regular Diet Adult       Significant Results and Procedures   Most Recent 3 CBC's:  Recent Labs   Lab Test 01/27/25  0318 01/26/25  0830 01/25/25  1254   WBC 6.4 8.9 10.2   HGB 8.7* 9.2* 10.2*   MCV 87 89 87    242 282     Most Recent 3 BMP's:  Recent Labs   Lab Test 01/27/25  1051 01/27/25  0318 01/26/25  0830 01/25/25  1254   NA  --  131* 132* 134*   POTASSIUM 3.3* 3.2* 3.4 3.2*   CHLORIDE  --  100 100 101   CO2  --  23 23 23   BUN  --  15.1 13.7 11.3   CR  --  1.08* 1.03* 0.98*   ANIONGAP  --  8 9 10   SAMUEL  --  8.1* 8.2* 7.7*   GLC  --  77 90 117*     7-Day Micro Results       Collected Updated Procedure Result Status      01/25/2025 2119 01/26/2025 2216 Blood Culture Arm, Left [[id number]]   Blood from Arm, Left    Preliminary result Component Value   Culture No growth after 1 day  [P]                01/25/2025 2119 01/26/2025 2201 Blood Culture Arm, Right [[id number]]   Blood from Arm, Right    Preliminary result Component Value   Culture No growth after 1 day  [P]                01/25/2025 1512 01/27/2025 1231 Urine Culture [[id number]]   (Abnormal)   Urine, Shahid Catheter    Preliminary result Component Value   Culture 50,000-100,000 CFU/mL Staphylococcus aureus  [P]                01/25/2025 1259 01/25/2025 1405 Influenza A/B, RSV and SARS-CoV2 PCR (COVID-19) Nose [[id number]]    Swab from Nose    Final result Component Value   Influenza A PCR Negative   Influenza B PCR Negative   RSV PCR Negative   SARS CoV2 PCR Negative   NEGATIVE: SARS-CoV-2 (COVID-19) RNA not detected, presumed negative.                  Most Recent Urinalysis:  Recent Labs   Lab Test 01/25/25  1515  07/21/24  1705 04/11/24  1152   COLOR Dark Brown*   < > Yellow   APPEARANCE Cloudy*   < > Clear   URINEGLC 30*   < > Negative   URINEBILI Negative   < > Negative   URINEKETONE Negative   < > Negative   SG 1.015   < > 1.010   UBLD Large*   < > Moderate*   URINEPH 7.0   < > 7.0   PROTEIN 300*   < > Trace*   UROBILINOGEN  --   --  1.0   NITRITE Negative   < > Positive*   LEUKEST Large*   < > Large*   RBCU >182*   < > 10-25*   WBCU >182*   < > >100*    < > = values in this interval not displayed.   ,   Results for orders placed or performed during the hospital encounter of 01/25/25   XR Chest 1 View    Narrative    Exam: XR CHEST 1 VIEW, 1/25/2025 1:16 PM    Indication: weakness    Comparison: 11/27/2024    Findings:   Right chest Port-A-Cath tip terminates over the SVC. No tracheal or  mediastinal deviation. No focal pulmonary opacities. No pneumothorax  or pleural effusion. Normal cardiac silhouette. No acute osseous  abnormalities. A few  air-filled distended bowel loops in the  visualized upper abdomen.      Impression    Impression: No acute airspace opacity.    I have personally reviewed the examination and initial interpretation  and I agree with the findings.    RENNY SWANN MD         SYSTEM ID:  [id number]       Discharge Medications   Current Discharge Medication List        CONTINUE these medications which have NOT CHANGED    Details   ondansetron (ZOFRAN) 8 MG tablet Take 1 tablet (8 mg) by mouth every 8 hours as needed for nausea  Qty: 20 tablet, Refills: 0    Associated Diagnoses: Carcinosarcoma of body of uterus (H)      prochlorperazine (COMPAZINE) 5 MG tablet Take 1-2 tablets (5-10 mg) by mouth every 6 hours as needed for nausea or vomiting  Qty: 40 tablet, Refills: 0    Associated Diagnoses: Carcinosarcoma of body of uterus (H)      acetaminophen (TYLENOL) 325 MG tablet Take 2 tablets (650 mg) by mouth every 6 hours as needed for mild pain  Qty: 24 tablet, Refills: 0    Associated Diagnoses:  Malignant neoplasm of overlapping sites of cervix (H)      albuterol (PROAIR HFA/PROVENTIL HFA/VENTOLIN HFA) 108 (90 Base) MCG/ACT inhaler Inhale 1-2 puffs into the lungs every 4 hours as needed for shortness of breath    Comments: Pharmacy may dispense brand covered by insurance (Proair, or proventil or ventolin or generic albuterol inhaler)      apixaban ANTICOAGULANT (ELIQUIS) 5 MG tablet Take 1 tablet (5 mg) by mouth 2 times daily.  Qty: 60 tablet, Refills: 1    Associated Diagnoses: Acute deep vein thrombosis (DVT) of brachial vein, unspecified laterality (H)      calcium Citrate-vitamin D 500-400 MG-UNIT CHEW Take 1 tablet by mouth daily      cyanocobalamin (CYANOCOBALAMIN) 1000 MCG/ML injection Inject 1 mL (1,000 mcg) into a muscle every month.      diclofenac (VOLTAREN) 1 % topical gel Apply 4 g topically 4 times daily as needed for moderate pain.  Qty: 50 g, Refills: 0    Associated Diagnoses: Other hydronephrosis      ferrous sulfate (CASSANDRA-IN-SOL) 75 (15 FE) MG/ML oral drops Take 15 mg by mouth daily      hydrocortisone 2.5 % cream Apply topically as needed      lidocaine (XYLOCAINE) 5 % external ointment Apply 1 Application topically as needed      melatonin 3 MG tablet Take 1 tablet (3 mg) by mouth or Feeding Tube at bedtime.  Qty: 30 tablet, Refills: 0    Associated Diagnoses: Other hydronephrosis      naloxone (NARCAN) 4 MG/0.1ML nasal spray Spray 1 spray (4 mg) into one nostril alternating nostrils as needed for opioid reversal every 2-3 minutes until assistance arrives  Qty: 2 each, Refills: 0    Associated Diagnoses: Malignant neoplasm of overlapping sites of cervix (H)      !! oxyCODONE (ROXICODONE) 5 MG tablet Take 1 tablet (5 mg) by mouth every 8 hours.  Qty: 60 tablet, Refills: 0    Associated Diagnoses: Abdominal pain, generalized      !! oxyCODONE (ROXICODONE) 5 MG tablet Take 1 tablet (5 mg) by mouth every 6 hours as needed for severe pain.  Qty: 30 tablet, Refills: 0    Associated Diagnoses:  "Other chronic pain      phenol (CHLORASEPTIC) 1.4 % spray Take 1-2 sprays (1-2 mLs) by mouth every hour as needed for sore throat.  Qty: 118 mL, Refills: 3    Associated Diagnoses: Sore throat      polyethylene glycol (MIRALAX) 17 GM/Dose powder Take 17 g by mouth daily as needed for constipation  Qty: 510 g, Refills: 0    Associated Diagnoses: S/P hysterectomy      senna-docusate (SENOKOT-S/PERICOLACE) 8.6-50 MG tablet Take 1 tablet by mouth 2 times daily  Qty: 60 tablet, Refills: 0    Associated Diagnoses: S/P hysterectomy      Skin Protectants, Misc. (INTERDRY 10\"X36\") SHEE Externally apply 10 each topically every 24 hours. Apply to skin folds on abdomen  Qty: 10 each, Refills: 1    Associated Diagnoses: Serous adenocarcinoma of uterus (H); Suprapubic catheter (H)      triamcinolone (KENALOG) 0.1 % external ointment Apply topically 3 times daily as needed for irritation.  Qty: 30 g, Refills: 1    Associated Diagnoses: Skin irritation       !! - Potential duplicate medications found. Please discuss with provider.        STOP taking these medications       childrens multivitamin w/iron (FLINTSTONES COMPLETE) 60 MG chewable tablet Comments:   Reason for Stopping:         cholecalciferol 125 MCG (5000 UT) CAPS Comments:   Reason for Stopping:         nitroFURantoin macrocrystal-monohydrate (MACROBID) 100 MG capsule Comments:   Reason for Stopping:             Allergies   Allergies   Allergen Reactions    Ibuprofen Nausea and Vomiting    Shrimp     Sulfa Antibiotics Rash     Patient started on bactrim 7/24/24 tolerating medication without rash on 7/25      "

## 2025-01-27 NOTE — PLAN OF CARE
"BP (!) 129/92 (BP Location: Right arm, Patient Position: Semi-Morrow's, Cuff Size: Adult Regular)   Pulse 91   Temp (!) 96.4  F (35.8  C) (Axillary)   Resp 19   Ht 1.6 m (5' 3\")   Wt 70.8 kg (156 lb)   SpO2 100%   BMI 27.63 kg/m      Admitted/transferred from:   2 RN full   skin assessment completed by Padmini Mina RN and TIFFANY Bess RN.  Skin assessment finding: issues found including small skin tear under left breast and small excoriated areas and irritation on sacrum/buttocks as well as +3-4 bilateral dependant edema    Interventions/actions: WOC consult ordered and skin interventions including barrier ream, mepilex, and weight shifting initiated.      Will continue to monitor.    Status: UTI with suprapubic catheter  Activity: Ax2 to reposition in bed; A x1-2 + gb + walker when OOB  Neuros: A&Ox4, no deficits noted.  Cardiac: WDL, denies chest pain.  Respiratory: WDL on RA, denies SOB.  GI/: +BS, +BM, AUOP via suprapubic catheter.  Diet: Tolerating bite sized/soft diet with thin liquids; takes pills whole with water.  Skin: See above.  Lines/Drains: R chest port SL.  Pain/Nausea: Denies nausea; pain managed with scheduled oxycodone and PRN tylenol.  New Changes: Pt admitted from ED around 0100; vitally stable, resting comfortably between cares.  "

## 2025-01-27 NOTE — PLAN OF CARE
Goal Outcome Evaluation:      Plan of Care Reviewed With: patient    Overall Patient Progress: no change  Outcome Evaluation: [id number]  Vitals: VSS on RA  Neuros: A&Ox4, able to make needs known  Cardiac: WNL, denies chest pain, off tele  IV: Port SL  Labs/Electrolytes: K protocol recheck @1645  Resp: Dyspnea upon exertion\  Diet: Regular  GI: Free water enema given, with medium stool  : Suprapubic catheter, adequate output  Skin: No new deficits noted  Pain: 9/10 back pain, managed with scheduled oxy  Activity: Ax2, walker, in chair  Social: Daughter will be coming to pick her up  Plan: continue to follow POC, notify provider of any changes  Updates this shift: discharge patient once daughter arrives

## 2025-01-28 ENCOUNTER — PATIENT OUTREACH (OUTPATIENT)
Dept: CARE COORDINATION | Facility: CLINIC | Age: 70
End: 2025-01-28
Payer: COMMERCIAL

## 2025-01-28 ENCOUNTER — PRE VISIT (OUTPATIENT)
Dept: UROLOGY | Facility: CLINIC | Age: 70
End: 2025-01-28
Payer: COMMERCIAL

## 2025-01-28 LAB — BACTERIA UR CULT: ABNORMAL

## 2025-01-28 NOTE — PLAN OF CARE
Physical Therapy Discharge Summary    Reason for therapy discharge:    Discharged to home with home therapy.    Progress towards therapy goal(s). See goals on Care Plan in [facility] electronic health record for goal details.  Goals met    Therapy recommendation(s):    Continued therapy is recommended.  Rationale/Recommendations:   PT to promote strength and activity tolerance.

## 2025-01-28 NOTE — PROGRESS NOTES
Connected Care Resource Center: [facility]    Background: Transitional Care Management program identified per system criteria and reviewed by [facility] [place] team for possible outreach.    Assessment: Upon chart review, CCR Team member will not proceed with patient outreach related to this episode of Transitional Care Management program due to reason below:    Patient has active communication with a nurse, provider or care team for reason of post-hospital follow up plan.  Outreach call by CCRC team not indicated to minimize duplicative efforts.     Plan: Transitional Care Management episode addressed appropriately per reason noted above.      Lucrecia Shi  Community Health Worker  [facility]  [phone]      *Connected Care Resource Team does NOT follow patient ongoing. Referrals are identified based on internal discharge reports and the outreach is to ensure patient has an understanding of their discharge instructions.

## 2025-01-28 NOTE — PROGRESS NOTES
Occupational Therapy Discharge Summary    Reason for therapy discharge:    Discharged to home with home therapy.    Progress towards therapy goal(s). See goals on Care Plan in [facility] electronic health record for goal details.  Goals partially met.  Barriers to achieving goals:   discharge from facility.    Therapy recommendation(s):    Continued therapy is recommended.  Rationale/Recommendations:  To promote IND and safety with functional mobility and safety.

## 2025-01-28 NOTE — PLAN OF CARE
Goal Outcome Evaluation:      Plan of Care Reviewed With: patient    Overall Patient Progress: no changeOverall Patient Progress: no change     [id number]  Still awaiting arrival of daughter Joy to review discharge paperwork. Pt unable to reach her daughter. Writer called Joy and spoke w/ her. She currently is en route and should be at hospital ~2000. R chest port remains hep locked and will need to be de-accessed upon discharge. Zofran given x1 for nausea. Continue POC

## 2025-01-29 NOTE — TELEPHONE ENCOUNTER
Reason for visit: Cath Exchange     Relevant information: Significant Urology Hx.    Records/imaging/labs/orders: Available in Epic    Pt called: No need for a call    At Rooming: Standard     Jonathan Bonilla, EMT-P  1/29/2025  11:33 AM

## 2025-01-30 LAB
BACTERIA BLD CULT: NO GROWTH
BACTERIA BLD CULT: NO GROWTH

## 2025-02-05 ENCOUNTER — APPOINTMENT (OUTPATIENT)
Dept: GENERAL RADIOLOGY | Facility: CLINIC | Age: 70
End: 2025-02-05
Attending: EMERGENCY MEDICINE
Payer: MEDICARE

## 2025-02-05 ENCOUNTER — HOSPITAL ENCOUNTER (INPATIENT)
Facility: CLINIC | Age: 70
End: 2025-02-05
Attending: EMERGENCY MEDICINE | Admitting: INTERNAL MEDICINE
Payer: MEDICARE

## 2025-02-05 ENCOUNTER — APPOINTMENT (OUTPATIENT)
Dept: ULTRASOUND IMAGING | Facility: CLINIC | Age: 70
DRG: 682 | End: 2025-02-05
Attending: EMERGENCY MEDICINE
Payer: MEDICARE

## 2025-02-05 DIAGNOSIS — T14.8XXA WOUND PAIN: Primary | ICD-10-CM

## 2025-02-05 DIAGNOSIS — T83.510A URINARY TRACT INFECTION ASSOCIATED WITH CYSTOSTOMY CATHETER, INITIAL ENCOUNTER: ICD-10-CM

## 2025-02-05 DIAGNOSIS — C53.8 MALIGNANT NEOPLASM OF OVERLAPPING SITES OF CERVIX (H): ICD-10-CM

## 2025-02-05 DIAGNOSIS — R60.0 BILATERAL LOWER EXTREMITY EDEMA: ICD-10-CM

## 2025-02-05 DIAGNOSIS — N39.0 URINARY TRACT INFECTION ASSOCIATED WITH CYSTOSTOMY CATHETER, INITIAL ENCOUNTER: ICD-10-CM

## 2025-02-05 DIAGNOSIS — L89.151 PRESSURE INJURY OF SACRAL REGION, STAGE 1: ICD-10-CM

## 2025-02-05 DIAGNOSIS — G47.00 INSOMNIA, UNSPECIFIED TYPE: ICD-10-CM

## 2025-02-05 LAB
ALBUMIN SERPL BCG-MCNC: 1.9 G/DL (ref 3.5–5.2)
ALBUMIN UR-MCNC: 100 MG/DL
ALP SERPL-CCNC: 147 U/L (ref 40–150)
ALT SERPL W P-5'-P-CCNC: 12 U/L (ref 0–50)
ANION GAP SERPL CALCULATED.3IONS-SCNC: 9 MMOL/L (ref 7–15)
APPEARANCE UR: ABNORMAL
AST SERPL W P-5'-P-CCNC: 20 U/L (ref 0–45)
BASOPHILS # BLD AUTO: 0 10E3/UL (ref 0–0.2)
BASOPHILS NFR BLD AUTO: 0 %
BILIRUB SERPL-MCNC: 0.5 MG/DL
BILIRUB UR QL STRIP: NEGATIVE
BUN SERPL-MCNC: 29.3 MG/DL (ref 8–23)
CALCIUM SERPL-MCNC: 8 MG/DL (ref 8.8–10.4)
CHLORIDE SERPL-SCNC: 98 MMOL/L (ref 98–107)
CK SERPL-CCNC: 28 U/L (ref 26–192)
COLOR UR AUTO: ABNORMAL
CREAT SERPL-MCNC: 1.36 MG/DL (ref 0.51–0.95)
EGFRCR SERPLBLD CKD-EPI 2021: 41 ML/MIN/1.73M2
EOSINOPHIL # BLD AUTO: 0 10E3/UL (ref 0–0.7)
EOSINOPHIL NFR BLD AUTO: 0 %
ERYTHROCYTE [DISTWIDTH] IN BLOOD BY AUTOMATED COUNT: 18.3 % (ref 10–15)
GLUCOSE SERPL-MCNC: 82 MG/DL (ref 70–99)
GLUCOSE UR STRIP-MCNC: NEGATIVE MG/DL
HCO3 SERPL-SCNC: 21 MMOL/L (ref 22–29)
HCT VFR BLD AUTO: 29.3 % (ref 35–47)
HGB BLD-MCNC: 10 G/DL (ref 11.7–15.7)
HGB UR QL STRIP: ABNORMAL
HYALINE CASTS: 33 /LPF
IMM GRANULOCYTES # BLD: 0 10E3/UL
IMM GRANULOCYTES NFR BLD: 0 %
INR PPP: 1.24 (ref 0.85–1.15)
KETONES UR STRIP-MCNC: NEGATIVE MG/DL
LACTATE SERPL-SCNC: 1.9 MMOL/L (ref 0.7–2)
LEUKOCYTE ESTERASE UR QL STRIP: ABNORMAL
LYMPHOCYTES # BLD AUTO: 1.3 10E3/UL (ref 0.8–5.3)
LYMPHOCYTES NFR BLD AUTO: 13 %
MCH RBC QN AUTO: 29.4 PG (ref 26.5–33)
MCHC RBC AUTO-ENTMCNC: 34.1 G/DL (ref 31.5–36.5)
MCV RBC AUTO: 86 FL (ref 78–100)
MONOCYTES # BLD AUTO: 0.6 10E3/UL (ref 0–1.3)
MONOCYTES NFR BLD AUTO: 6 %
MUCOUS THREADS #/AREA URNS LPF: PRESENT /LPF
NEUTROPHILS # BLD AUTO: 8 10E3/UL (ref 1.6–8.3)
NEUTROPHILS NFR BLD AUTO: 81 %
NITRATE UR QL: NEGATIVE
NRBC # BLD AUTO: 0 10E3/UL
NRBC BLD AUTO-RTO: 0 /100
PH UR STRIP: 5.5 [PH] (ref 5–7)
PLATELET # BLD AUTO: 269 10E3/UL (ref 150–450)
POTASSIUM SERPL-SCNC: 4.9 MMOL/L (ref 3.4–5.3)
PROT SERPL-MCNC: 5 G/DL (ref 6.4–8.3)
RBC # BLD AUTO: 3.4 10E6/UL (ref 3.8–5.2)
RBC URINE: >182 /HPF
SODIUM SERPL-SCNC: 128 MMOL/L (ref 135–145)
SP GR UR STRIP: 1.02 (ref 1–1.03)
TRANSITIONAL EPI: 1 /HPF
UROBILINOGEN UR STRIP-MCNC: NORMAL MG/DL
WBC # BLD AUTO: 9.9 10E3/UL (ref 4–11)
WBC CLUMPS #/AREA URNS HPF: PRESENT /HPF
WBC URINE: >182 /HPF
YEAST #/AREA URNS HPF: ABNORMAL /HPF

## 2025-02-05 PROCEDURE — 85025 COMPLETE CBC W/AUTO DIFF WBC: CPT | Performed by: EMERGENCY MEDICINE

## 2025-02-05 PROCEDURE — 99285 EMERGENCY DEPT VISIT HI MDM: CPT | Performed by: EMERGENCY MEDICINE

## 2025-02-05 PROCEDURE — 120N000002 HC R&B MED SURG/OB UMMC

## 2025-02-05 PROCEDURE — 93970 EXTREMITY STUDY: CPT

## 2025-02-05 PROCEDURE — 36415 COLL VENOUS BLD VENIPUNCTURE: CPT | Performed by: EMERGENCY MEDICINE

## 2025-02-05 PROCEDURE — 81001 URINALYSIS AUTO W/SCOPE: CPT | Performed by: EMERGENCY MEDICINE

## 2025-02-05 PROCEDURE — 250N000011 HC RX IP 250 OP 636: Mod: JZ | Performed by: NURSE PRACTITIONER

## 2025-02-05 PROCEDURE — 71045 X-RAY EXAM CHEST 1 VIEW: CPT

## 2025-02-05 PROCEDURE — 250N000013 HC RX MED GY IP 250 OP 250 PS 637: Performed by: NURSE PRACTITIONER

## 2025-02-05 PROCEDURE — 83605 ASSAY OF LACTIC ACID: CPT | Performed by: EMERGENCY MEDICINE

## 2025-02-05 PROCEDURE — 71045 X-RAY EXAM CHEST 1 VIEW: CPT | Mod: 26 | Performed by: RADIOLOGY

## 2025-02-05 PROCEDURE — 84295 ASSAY OF SERUM SODIUM: CPT | Performed by: EMERGENCY MEDICINE

## 2025-02-05 PROCEDURE — 85610 PROTHROMBIN TIME: CPT | Performed by: EMERGENCY MEDICINE

## 2025-02-05 PROCEDURE — 87040 BLOOD CULTURE FOR BACTERIA: CPT | Performed by: NURSE PRACTITIONER

## 2025-02-05 PROCEDURE — 250N000013 HC RX MED GY IP 250 OP 250 PS 637: Performed by: EMERGENCY MEDICINE

## 2025-02-05 PROCEDURE — 87106 FUNGI IDENTIFICATION YEAST: CPT | Performed by: EMERGENCY MEDICINE

## 2025-02-05 PROCEDURE — 99285 EMERGENCY DEPT VISIT HI MDM: CPT | Mod: 25 | Performed by: EMERGENCY MEDICINE

## 2025-02-05 PROCEDURE — 93970 EXTREMITY STUDY: CPT | Mod: 26 | Performed by: RADIOLOGY

## 2025-02-05 PROCEDURE — 82550 ASSAY OF CK (CPK): CPT | Performed by: NURSE PRACTITIONER

## 2025-02-05 PROCEDURE — 87186 SC STD MICRODIL/AGAR DIL: CPT | Performed by: EMERGENCY MEDICINE

## 2025-02-05 PROCEDURE — 99207 PR APP CREDIT; MD BILLING SHARED VISIT: CPT | Performed by: NURSE PRACTITIONER

## 2025-02-05 PROCEDURE — 84155 ASSAY OF PROTEIN SERUM: CPT | Performed by: EMERGENCY MEDICINE

## 2025-02-05 PROCEDURE — 258N000003 HC RX IP 258 OP 636: Performed by: NURSE PRACTITIONER

## 2025-02-05 PROCEDURE — 99222 1ST HOSP IP/OBS MODERATE 55: CPT | Mod: AI | Performed by: INTERNAL MEDICINE

## 2025-02-05 RX ORDER — NALOXONE HYDROCHLORIDE 0.4 MG/ML
0.2 INJECTION, SOLUTION INTRAMUSCULAR; INTRAVENOUS; SUBCUTANEOUS
Status: ACTIVE | OUTPATIENT
Start: 2025-02-05

## 2025-02-05 RX ORDER — ONDANSETRON 4 MG/1
4 TABLET, ORALLY DISINTEGRATING ORAL EVERY 6 HOURS PRN
Status: DISPENSED | OUTPATIENT
Start: 2025-02-05

## 2025-02-05 RX ORDER — BISACODYL 10 MG
10 SUPPOSITORY, RECTAL RECTAL DAILY PRN
Status: ACTIVE | OUTPATIENT
Start: 2025-02-05

## 2025-02-05 RX ORDER — OXYCODONE HYDROCHLORIDE 10 MG/1
10 TABLET ORAL ONCE
Status: COMPLETED | OUTPATIENT
Start: 2025-02-05 | End: 2025-02-05

## 2025-02-05 RX ORDER — OXYCODONE HYDROCHLORIDE 15 MG/1
15 TABLET ORAL EVERY 4 HOURS PRN
Status: ON HOLD | COMMUNITY
Start: 2025-01-20

## 2025-02-05 RX ORDER — NALOXONE HYDROCHLORIDE 0.4 MG/ML
0.4 INJECTION, SOLUTION INTRAMUSCULAR; INTRAVENOUS; SUBCUTANEOUS
Status: ACTIVE | OUTPATIENT
Start: 2025-02-05

## 2025-02-05 RX ORDER — HYDROMORPHONE HYDROCHLORIDE 1 MG/ML
0.5 INJECTION, SOLUTION INTRAMUSCULAR; INTRAVENOUS; SUBCUTANEOUS ONCE
Status: COMPLETED | OUTPATIENT
Start: 2025-02-05 | End: 2025-02-05

## 2025-02-05 RX ORDER — LIDOCAINE 40 MG/G
CREAM TOPICAL
Status: ACTIVE | OUTPATIENT
Start: 2025-02-05

## 2025-02-05 RX ORDER — ACETAMINOPHEN 650 MG/1
650 SUPPOSITORY RECTAL EVERY 4 HOURS PRN
Status: DISCONTINUED | OUTPATIENT
Start: 2025-02-05 | End: 2025-02-14

## 2025-02-05 RX ORDER — BISACODYL 5 MG
5 TABLET, DELAYED RELEASE (ENTERIC COATED) ORAL DAILY PRN
Status: ACTIVE | OUTPATIENT
Start: 2025-02-05

## 2025-02-05 RX ORDER — OXYCODONE HYDROCHLORIDE 5 MG/1
5 TABLET ORAL EVERY 6 HOURS PRN
Status: DISCONTINUED | OUTPATIENT
Start: 2025-02-05 | End: 2025-02-05

## 2025-02-05 RX ORDER — OXYCODONE HYDROCHLORIDE 10 MG/1
10 TABLET ORAL
Status: DISCONTINUED | OUTPATIENT
Start: 2025-02-05 | End: 2025-02-06

## 2025-02-05 RX ORDER — ONDANSETRON 2 MG/ML
4 INJECTION INTRAMUSCULAR; INTRAVENOUS EVERY 6 HOURS PRN
Status: ACTIVE | OUTPATIENT
Start: 2025-02-05

## 2025-02-05 RX ORDER — ACETAMINOPHEN 325 MG/1
650 TABLET ORAL EVERY 4 HOURS PRN
Status: DISCONTINUED | OUTPATIENT
Start: 2025-02-05 | End: 2025-02-14

## 2025-02-05 RX ORDER — CALCIUM CARBONATE 500 MG/1
1000 TABLET, CHEWABLE ORAL 4 TIMES DAILY PRN
Status: ACTIVE | OUTPATIENT
Start: 2025-02-05

## 2025-02-05 RX ORDER — POLYETHYLENE GLYCOL 3350 17 G/17G
17 POWDER, FOR SOLUTION ORAL 2 TIMES DAILY PRN
Status: DISPENSED | OUTPATIENT
Start: 2025-02-05

## 2025-02-05 RX ORDER — BISACODYL 5 MG
10 TABLET, DELAYED RELEASE (ENTERIC COATED) ORAL DAILY PRN
Status: ACTIVE | OUTPATIENT
Start: 2025-02-05

## 2025-02-05 RX ADMIN — OXYCODONE HYDROCHLORIDE 10 MG: 10 TABLET ORAL at 22:30

## 2025-02-05 RX ADMIN — HYDROMORPHONE HYDROCHLORIDE 0.5 MG: 1 INJECTION, SOLUTION INTRAMUSCULAR; INTRAVENOUS; SUBCUTANEOUS at 20:13

## 2025-02-05 RX ADMIN — OXYCODONE HYDROCHLORIDE 10 MG: 10 TABLET ORAL at 15:57

## 2025-02-05 RX ADMIN — APIXABAN 5 MG: 5 TABLET, FILM COATED ORAL at 19:32

## 2025-02-05 RX ADMIN — SODIUM CHLORIDE 500 ML: 9 INJECTION, SOLUTION INTRAVENOUS at 19:32

## 2025-02-05 ASSESSMENT — ACTIVITIES OF DAILY LIVING (ADL)
ADLS_ACUITY_SCORE: 61
ADLS_ACUITY_SCORE: 67
ADLS_ACUITY_SCORE: 61
ADLS_ACUITY_SCORE: 67

## 2025-02-05 NOTE — ED PROVIDER NOTES
ED PROVIDER NOTE  [facility]  [facility] [place] [place]      History     Chief Complaint   Patient presents with    Wound Check    Leg Swelling    Leg Pain     HPI  Alis Hartman is a 69 year old female with a previous history of paranoid schizophrenia lower extremity edema and is status post cervical cancer as well as having stage III chronic kidney disease.  The patient has a history of having lower extremity edema and occasionally wears compression stockings. Lately has had increased swelling and weeping from her legs bilaterally along with a development of some buttock sores.  Patient denies any fevers and was brought in by her son for further evaluation.    This part of the document was transcribed by Omari Ferguson Medical Scribe.    I have reviewed the Medications, Allergies, Past Medical and Surgical History, and Social History in the [employer] (TMG) system.    Past Medical History:   Diagnosis Date    Atrial fibrillation (H)     Depression     Hypertension     Malignant neoplasm of endocervix (H)     Tx with radiation    Near syncope 11/7/2024    Orthopnea 9/21/2024    Other chronic pain     Low back    Schizophrenia (H)     Urinary incontinence        Past Surgical History:   Procedure Laterality Date    ABDOMINOPLASTY      BIOPSY CERVICAL, LOCAL EXCISION, SINGLE/MULTIPLE  10/26/2011    Procedure:BIOPSY CERVICAL, LOCAL EXCISION, SINGLE/MULTIPLE; EUA, cervical biopsies; Surgeon:BETTY TINEO; Location:[facility] OR    BIOPSY VAGINAL N/A 06/08/2021    Procedure: BIOPSY, VAGINA;  Surgeon: Dany Perez MD;  Location: [facility]    CYSTOSCOPY N/A 05/17/2024    Procedure: Cystoscopy;  Surgeon: Dany Perez MD;  [facility]: [facility]    CYSTOSTOMY, INSERT TUBE SUPRAPUBIC, COMBINED  07/12/2024    Procedure: Insertion of supra-pubic catheter;  Surgeon: Dany Perez MD;  [facility]    DILATION AND CURETTAGE, WITH ULTRASOUND GUIDANCE N/A 05/17/2024    Procedure: Dilation and curettage of the uterus with drainage  of uterine fluid under ultrasound guidance, lysis of vaginal adhesions;  Surgeon: Dany Perez MD;  [facility]    EXAM UNDER ANESTHESIA PELVIC N/A 03/12/2020    Procedure: Exam under anesthsia, vaginal biopsies, possible CO2 laser of the vagina;  Surgeon: Lilliam Roy MD;  [facility]    GI SURGERY  2004    gastric bypass    HYSTERECTOMY TOTAL ABDOMINAL, BILATERAL SALPINGO-OOPHORECTOMY, COMBINED Bilateral 07/12/2024    Procedure: Diagnostic laparoscopy converted to exploratory laparotomy, total abdominal hysterectomy, bilateral salpingo-oophorectomy, lysis of adhesions >60 min;  Surgeon: Dany Perez MD;  [facility]    INSERT PORT VASCULAR ACCESS N/A 08/26/2024    Procedure: Insert port vascular access;  Surgeon: Avery Gregory MD;  [facility]    IR CHEST PORT PLACEMENT > 5 YRS OF AGE  08/26/2024    IR NEPHROSTOMY TUBE PLACEMENT LEFT  11/29/2024    LASER CO2 VAGINA N/A 03/02/2015    Procedure: LASER CO2 VAGINA;  Surgeon: Mariela Abdalla MD;  Location: [facility]    LASER CO2 VAGINA N/A 05/24/2019    Procedure: Exam under anesthesia, vaginal biopsies, CO2 laser of the vagina;  Surgeon: Lilliam Roy MD;  Location: [facility]    LASER CO2 VAGINA N/A 06/08/2021    Procedure: Exam under anesthesia, laser ablation of the upper vagina;  Surgeon: Dany Perez MD;  Location: [facility]    PICC DOUBLE LUMEN PLACEMENT Left 11/28/2024    left basilic 5 fr dl power picc 44 cm    REPAIR BLADDER N/A 07/12/2024    Procedure: Repair bladder;  Surgeon: Dany Perez MD;  [facility]         Dose / Directions   acetaminophen 325 MG tablet  Commonly known as: TYLENOL  Used for: Malignant neoplasm of overlapping sites of cervix (H)      Dose: 650 mg  Take 2 tablets (650 mg) by mouth every 6 hours as needed for mild pain  Quantity: 24 tablet  Refills: 0     albuterol 108 (90 Base) MCG/ACT inhaler  Commonly known as: PROAIR HFA/PROVENTIL HFA/VENTOLIN HFA      Dose: 1-2 puff  Inhale  1-2 puffs into the lungs every 4 hours as needed for shortness of breath  Refills: 0     apixaban ANTICOAGULANT 5 MG tablet  Commonly known as: ELIQUIS  Used for: Acute deep vein thrombosis (DVT) of brachial vein, unspecified laterality (H)      Dose: 5 mg  Take 1 tablet (5 mg) by mouth 2 times daily.  Quantity: 60 tablet  Refills: 1     calcium Citrate-vitamin D 500-400 MG-UNIT Chew      Dose: 1 tablet  Take 1 tablet by mouth daily  Refills: 0     cyanocobalamin 1000 mcg/mL injection  Commonly known as: CYANOCOBALAMIN      Inject 1 mL (1,000 mcg) into a muscle every month.  Refills: 0     diclofenac 1 % topical gel  Commonly known as: VOLTAREN  Used for: Other hydronephrosis      Dose: 4 g  Apply 4 g topically 4 times daily as needed for moderate pain.  Quantity: 50 g  Refills: 0     ferrous sulfate 75 (15 FE) MG/ML oral drops  Commonly known as: CASSANDRA-IN-SOL      Dose: 15 mg  Take 15 mg by mouth daily  Refills: 0     hydrocortisone 2.5 % cream      Apply topically as needed  Refills: 0     lidocaine 5 % external ointment  Commonly known as: XYLOCAINE      Dose: 1 Application.  Apply 1 Application topically as needed  Refills: 0     naloxone 4 MG/0.1ML nasal spray  Commonly known as: NARCAN  Used for: Malignant neoplasm of overlapping sites of cervix (H)      Dose: 4 mg  Spray 1 spray (4 mg) into one nostril alternating nostrils as needed for opioid reversal every 2-3 minutes until assistance arrives  Quantity: 2 each  Refills: 0     ondansetron 8 MG tablet  Commonly known as: ZOFRAN  Used for: Carcinosarcoma of body of uterus (H)      Dose: 8 mg  Take 1 tablet (8 mg) by mouth every 8 hours as needed for nausea  Quantity: 20 tablet  Refills: 0     * oxyCODONE 5 MG tablet  Commonly known as: ROXICODONE  Used for: Other chronic pain      Dose: 5 mg  Take 1 tablet (5 mg) by mouth every 6 hours as needed for severe pain.  Quantity: 30 tablet  Refills: 0     * oxyCODONE 5 MG tablet  Commonly known as: ROXICODONE  Used  "for: Abdominal pain, generalized      Dose: 5 mg  Take 1 tablet (5 mg) by mouth every 8 hours.  Quantity: 60 tablet  Refills: 0     phenol 1.4 % spray  Commonly known as: CHLORASEPTIC  Used for: Sore throat      Dose: 1-2 spray  Take 1-2 sprays (1-2 mLs) by mouth every hour as needed for sore throat.  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 Apply to skin folds on abdomen  Quantity: 10 each  Refills: 1     melatonin 3 MG tablet  Used for: Other hydronephrosis      Dose: 3 mg  Take 1 tablet (3 mg) by mouth or Feeding Tube at bedtime.  Quantity: 30 tablet  Refills: 0              Past medical history, past surgical history, medications, and allergies were reviewed with the patient. Additional pertinent items: None    Family History   Problem Relation Age of Onset    Heart Disease Father     Heart Failure Father     No Known Problems Brother     No Known Problems Brother     No " Known Problems Brother     Pancreatitis Brother     Hypertension Sister     Peripheral Vascular Disease Sister     No Known Problems Sister     No Known Problems Son     No Known Problems Daughter        Social History     Tobacco Use    Smoking status: Never    Smokeless tobacco: Never   Substance Use Topics    Alcohol use: Not Currently     Social history was reviewed with the patient. Additional pertinent items: None    Allergies   Allergen Reactions    Ibuprofen Nausea and Vomiting    Shrimp     Sulfa Antibiotics Rash     Patient started on bactrim 7/24/24 tolerating medication without rash on 7/25        Review of Systems  A medically appropriate review of systems was performed with pertinent positives and negatives noted in the HPI, and all other systems negative.    Physical Exam   BP: 116/82  Pulse: 79  Temp: 98.3  F (36.8  C)  Resp: 16      Physical Exam  Vitals and nursing note reviewed.   Constitutional:       Comments: Wake and cooperative but with moderate discomfort secondary to the lower extremities   HENT:      Head: Atraumatic.      Mouth/Throat:      Mouth: Mucous membranes are dry.   Eyes:      Extraocular Movements: Extraocular movements intact.      Pupils: Pupils are equal, round, and reactive to light.   Cardiovascular:      Rate and Rhythm: Regular rhythm.   Pulmonary:      Breath sounds: Normal breath sounds.   Musculoskeletal:      Comments: Lower extremities with 4+ edema and weeping vesicles.   Skin:     Comments: Sacrum with redness and early decubiti   Neurological:      General: No focal deficit present.         ED Course     Orders Placed This Encounter   Procedures    XR Chest 1 View    US Lower Extremity Venous Duplex Bilateral    INR    Comprehensive metabolic panel    UA with Microscopic reflex to Culture    CBC with platelets and differential    Lactic Acid Whole Blood w/ 1x repeat in 2 hrs when >2    Peripheral IV catheter    Vital signs q30 min x 1 hour    Blood Culture  Peripheral Blood    Blood Culture Peripheral Blood    Urine Culture    CBC with platelets differential          Procedures         Results for orders placed or performed during the hospital encounter of 02/05/25 (from the past 24 hours)   XR Chest 1 View    Narrative    Exam: XR CHEST 1 VIEW, 2/5/2025 1:43 PM    Indication: edema    Comparison: 1/25/2025    Findings:   Frontal, upright view of the chest. Right IJ tunnel catheter  terminates in the SVC. Patient is rotated. Cardiomediastinal  silhouette is within normal limits. No confluent airspace opacities.  No discernible pneumothorax, however the left apex is not well  evaluated given the degree of obliquity. The costophrenic angles are  clear. Atherosclerotic calcifications of the aortic arch. No acute  osseous abnormality.      Impression    Impression: No acute cardiopulmonary abnormality.    I have personally reviewed the examination and initial interpretation  and I agree with the findings.    SHAYNA ADKINS MD         SYSTEM ID:  [id number]   US Lower Extremity Venous Duplex Bilateral    Narrative    ULTRASOUND LOWER EXTREMITY VENOUS DUPLEX BILATERAL 2/5/2025 2:14 PM    CLINICAL HISTORY: Increased bilateral lower edema      COMPARISONS: None available.    REFERRING PROVIDER: RONALD BHARDWAJ CHRISTIAN    TECHNIQUE: Grayscale, color Doppler, Doppler waveform ultrasound  evaluation was performed through the bilateral common femoral,  femoral, and popliteal veins. Bilateral posterior tibial and peroneal  veins were evaluated with grayscale imaging and compression.    FINDINGS: Right common femoral, femoral, and popliteal veins are fully  compressible with phasic Doppler waveforms.    Right posterior tibial veins are fully compressible.    Right peroneal veins are fully compressible.    Left common femoral, femoral, and popliteal veins are fully  compressible with phasic Doppler waveforms.    Left posterior tibial veins are fully compressible.    Left  "peroneal veins are fully compressible.      Impression    IMPRESSION: No deep venous thrombosis demonstrated in either leg.    Reference: \"Duplex Ultrasound in the Diagnosis of Lower-Extremity Deep  Venous Thrombosis\"- yMa Faust MD, S; Abelardo Dejesus MD  (Circulation. 2014;129:917-921. http://circ.ahajournals.org)    GAETANO CORTEZ MD         SYSTEM ID:  [id number]   CBC with platelets differential    Narrative    The following orders were created for panel order CBC with platelets differential.  Procedure                               Abnormality         Status                     ---------                               -----------         ------                     CBC with platelets and d...[[id number]]  Abnormal            Final result                 Please view results for these tests on the individual orders.   INR   Result Value Ref Range    INR 1.24 (H) 0.85 - 1.15   Comprehensive metabolic panel   Result Value Ref Range    Sodium 128 (L) 135 - 145 mmol/L    Potassium 4.9 3.4 - 5.3 mmol/L    Carbon Dioxide (CO2) 21 (L) 22 - 29 mmol/L    Anion Gap 9 7 - 15 mmol/L    Urea Nitrogen 29.3 (H) 8.0 - 23.0 mg/dL    Creatinine 1.36 (H) 0.51 - 0.95 mg/dL    GFR Estimate 41 (L) >60 mL/min/1.73m2    Calcium 8.0 (L) 8.8 - 10.4 mg/dL    Chloride 98 98 - 107 mmol/L    Glucose 82 70 - 99 mg/dL    Alkaline Phosphatase 147 40 - 150 U/L    AST 20 0 - 45 U/L    ALT 12 0 - 50 U/L    Protein Total 5.0 (L) 6.4 - 8.3 g/dL    Albumin 1.9 (L) 3.5 - 5.2 g/dL    Bilirubin Total 0.5 <=1.2 mg/dL   CBC with platelets and differential   Result Value Ref Range    WBC Count 9.9 4.0 - 11.0 10e3/uL    RBC Count 3.40 (L) 3.80 - 5.20 10e6/uL    Hemoglobin 10.0 (L) 11.7 - 15.7 g/dL    Hematocrit 29.3 (L) 35.0 - 47.0 %    MCV 86 78 - 100 fL    MCH 29.4 26.5 - 33.0 pg    MCHC 34.1 31.5 - 36.5 g/dL    RDW 18.3 (H) 10.0 - 15.0 %    Platelet Count 269 150 - 450 10e3/uL    % Neutrophils 81 %    % Lymphocytes 13 %    % Monocytes 6 %    % " Eosinophils 0 %    % Basophils 0 %    % Immature Granulocytes 0 %    NRBCs per 100 WBC 0 <1 /100    Absolute Neutrophils 8.0 1.6 - 8.3 10e3/uL    Absolute Lymphocytes 1.3 0.8 - 5.3 10e3/uL    Absolute Monocytes 0.6 0.0 - 1.3 10e3/uL    Absolute Eosinophils 0.0 0.0 - 0.7 10e3/uL    Absolute Basophils 0.0 0.0 - 0.2 10e3/uL    Absolute Immature Granulocytes 0.0 <=0.4 10e3/uL    Absolute NRBCs 0.0 10e3/uL   UA with Microscopic reflex to Culture    Specimen: Urine, Shahid Catheter   Result Value Ref Range    Color Urine Orange (A) Colorless, Straw, Light Yellow, Yellow    Appearance Urine Cloudy (A) Clear    Glucose Urine Negative Negative mg/dL    Bilirubin Urine Negative Negative    Ketones Urine Negative Negative mg/dL    Specific Gravity Urine 1.020 1.003 - 1.035    Blood Urine Large (A) Negative    pH Urine 5.5 5.0 - 7.0    Protein Albumin Urine 100 (A) Negative mg/dL    Urobilinogen Urine Normal Normal, 2.0 mg/dL    Nitrite Urine Negative Negative    Leukocyte Esterase Urine Large (A) Negative    WBC Clumps Urine Present (A) None Seen /HPF    Budding Yeast Urine Few (A) None Seen /HPF    Mucus Urine Present (A) None Seen /LPF    RBC Urine >182 (H) <=2 /HPF    WBC Urine >182 (H) <=5 /HPF    Transitional Epithelials Urine 1 <=1 /HPF    Hyaline Casts Urine 33 (H) <=2 /LPF    Narrative    Urine Culture ordered based on laboratory criteria   Lactic Acid Whole Blood w/ 1x repeat in 2 hrs when >2   Result Value Ref Range    Lactic Acid, Initial 1.9 0.7 - 2.0 mmol/L       Medications   sodium chloride (PF) 0.9% PF flush 3 mL (3 mLs Intracatheter $Given 2/5/25 5210)   sodium chloride (PF) 0.9% PF flush 3 mL (has no administration in time range)   oxyCODONE IR (ROXICODONE) tablet 10 mg (10 mg Oral $Given 2/5/25 1557)       Critical care was not performed.     Medical Decision Making  The patient's presentation was of moderate complexity (a chronic illness mild to moderate exacerbation, progression, or side effect of  treatment).    The patient's evaluation involved:  ordering and/or review of 3+ test(s) in this encounter (see above)    The patient's management necessitated high risk (a decision regarding hospitalization).      Assessments & Plan (with Medical Decision Making)     I have reviewed the nursing notes.    I have reviewed the findings, diagnosis, and plan with the patient.          Final diagnoses:   Bilateral lower extremity edema - with inability to walk   Pressure injury of sacral region, stage 1     Adm Med      RONALD BHARDWAJ MD    2/5/2025   [facility] EMERGENCY DEPARTMENT       Ronald Bhardwaj MD  02/05/25 [id number]

## 2025-02-05 NOTE — LETTER
Transition Communication Hand-off for Care Transitions to Next Level of Care Provider    Name: Alis Hartman  : 1955  MRN #: [id number]  Primary Care Provider: Maria Fernanda Eckert     Primary Clinic: [address]  [place] [facility] [address]     Reason for Hospitalization:  Bilateral lower extremity edema [R60.0]  Pressure injury of sacral region, stage 1 [L89.151]  Admit Date/Time: 2025 12:55 PM  Discharge Date: 2025  Payor Source: Payor: MEDICARE / Plan: MEDICARE / Product Type: Medicare /          Reason for Communication Hand-off Referral: Avoidable readmission within 30 days    Discharge Plan: Discharge to home  family support and home care RN/PT/OT/HHA from [facility].  New DME: santo lift, hospital bed    Discharge Needs Assessment:  Needs      Flowsheet Row Most Recent Value   Anticipated Changes Related to Illness inability to care for someone else, inability to care for self   Equipment Currently Used at Home wheelchair, manual, walker, rolling, shower chair  [4WW]   # of Referrals Placed by  Homecare          Follow-up plan:    Future Appointments   Date Time Provider Department Center   2025  2:15 PM Dany Perez MD [facility] [place]       Any outstanding tests or procedures:        Radiology & Cardiology Orders       Future Labs/Procedures Complete By Expires    US Renal Complete Non-Vascular  2025 (Approximate) 2026          Referrals       Future Labs/Procedures    Home Care Referral     Comments:    Your provider has ordered home health services. If you have not been contacted within 2 days of your discharge please call the selected Home Care agency listed on your Discharge document.  If a Home Care agency is NOT listed, please call [phone].              Key Recommendations:  Recommend PCP follow up appt.    Toby Faulkner RN Care Coordinator    AVS/Discharge Summary is the source of truth; this is a helpful guide for improved communication of  patient story

## 2025-02-06 VITALS
HEIGHT: 63 IN | RESPIRATION RATE: 18 BRPM | DIASTOLIC BLOOD PRESSURE: 79 MMHG | WEIGHT: 136.69 LBS | BODY MASS INDEX: 24.22 KG/M2 | HEART RATE: 94 BPM | TEMPERATURE: 97.5 F | SYSTOLIC BLOOD PRESSURE: 112 MMHG | OXYGEN SATURATION: 100 %

## 2025-02-06 LAB
ANION GAP SERPL CALCULATED.3IONS-SCNC: 13 MMOL/L (ref 7–15)
BACTERIA BLD CULT: NORMAL
BACTERIA BLD CULT: NORMAL
BACTERIA UR CULT: NORMAL
BUN SERPL-MCNC: 28.5 MG/DL (ref 8–23)
CALCIUM SERPL-MCNC: 8.1 MG/DL (ref 8.8–10.4)
CHLORIDE SERPL-SCNC: 96 MMOL/L (ref 98–107)
CREAT SERPL-MCNC: 1.33 MG/DL (ref 0.51–0.95)
CREAT SERPL-MCNC: 1.4 MG/DL (ref 0.51–0.95)
CREAT UR-MCNC: 92.1 MG/DL
EGFRCR SERPLBLD CKD-EPI 2021: 43 ML/MIN/1.73M2
FRACT EXCRET NA UR+SERPL-RTO: 0.3 %
GLUCOSE SERPL-MCNC: 74 MG/DL (ref 70–99)
HCO3 SERPL-SCNC: 20 MMOL/L (ref 22–29)
HOLD SPECIMEN: NORMAL
PHOSPHATE SERPL-MCNC: 4.2 MG/DL (ref 2.5–4.5)
POTASSIUM SERPL-SCNC: 5.3 MMOL/L (ref 3.4–5.3)
SODIUM SERPL-SCNC: 128 MMOL/L (ref 135–145)
SODIUM SERPL-SCNC: 129 MMOL/L (ref 135–145)
SODIUM UR-SCNC: 27 MMOL/L

## 2025-02-06 PROCEDURE — 84300 ASSAY OF URINE SODIUM: CPT | Performed by: INTERNAL MEDICINE

## 2025-02-06 PROCEDURE — 250N000013 HC RX MED GY IP 250 OP 250 PS 637: Performed by: NURSE PRACTITIONER

## 2025-02-06 PROCEDURE — 258N000003 HC RX IP 258 OP 636: Performed by: INTERNAL MEDICINE

## 2025-02-06 PROCEDURE — 84100 ASSAY OF PHOSPHORUS: CPT | Performed by: INTERNAL MEDICINE

## 2025-02-06 PROCEDURE — 36591 DRAW BLOOD OFF VENOUS DEVICE: CPT | Performed by: NURSE PRACTITIONER

## 2025-02-06 PROCEDURE — 250N000013 HC RX MED GY IP 250 OP 250 PS 637: Performed by: INTERNAL MEDICINE

## 2025-02-06 PROCEDURE — 82435 ASSAY OF BLOOD CHLORIDE: CPT | Performed by: NURSE PRACTITIONER

## 2025-02-06 PROCEDURE — 82565 ASSAY OF CREATININE: CPT | Performed by: NURSE PRACTITIONER

## 2025-02-06 PROCEDURE — 250N000011 HC RX IP 250 OP 636: Performed by: NURSE PRACTITIONER

## 2025-02-06 PROCEDURE — 84295 ASSAY OF SERUM SODIUM: CPT | Performed by: INTERNAL MEDICINE

## 2025-02-06 PROCEDURE — 99233 SBSQ HOSP IP/OBS HIGH 50: CPT | Performed by: INTERNAL MEDICINE

## 2025-02-06 PROCEDURE — 120N000002 HC R&B MED SURG/OB UMMC

## 2025-02-06 PROCEDURE — 82565 ASSAY OF CREATININE: CPT | Performed by: INTERNAL MEDICINE

## 2025-02-06 RX ORDER — AMOXICILLIN 250 MG
1 CAPSULE ORAL 2 TIMES DAILY
Status: DISPENSED | OUTPATIENT
Start: 2025-02-06

## 2025-02-06 RX ORDER — ACETAMINOPHEN 325 MG/1
650 TABLET ORAL 4 TIMES DAILY
Status: DISCONTINUED | OUTPATIENT
Start: 2025-02-06 | End: 2025-02-13

## 2025-02-06 RX ORDER — FERROUS SULFATE 7.5 MG/0.5
15 SYRINGE (EA) ORAL DAILY
Status: DISCONTINUED | OUTPATIENT
Start: 2025-02-06 | End: 2025-02-16

## 2025-02-06 RX ORDER — HYDROMORPHONE HYDROCHLORIDE 1 MG/ML
0.3 INJECTION, SOLUTION INTRAMUSCULAR; INTRAVENOUS; SUBCUTANEOUS EVERY 4 HOURS PRN
Status: DISCONTINUED | OUTPATIENT
Start: 2025-02-06 | End: 2025-02-15

## 2025-02-06 RX ADMIN — ACETAMINOPHEN 650 MG: 325 TABLET, FILM COATED ORAL at 20:34

## 2025-02-06 RX ADMIN — OXYCODONE HYDROCHLORIDE 15 MG: 10 TABLET ORAL at 17:31

## 2025-02-06 RX ADMIN — OXYCODONE HYDROCHLORIDE 15 MG: 10 TABLET ORAL at 23:33

## 2025-02-06 RX ADMIN — OXYCODONE HYDROCHLORIDE 15 MG: 10 TABLET ORAL at 20:34

## 2025-02-06 RX ADMIN — OXYCODONE HYDROCHLORIDE 10 MG: 10 TABLET ORAL at 01:50

## 2025-02-06 RX ADMIN — APIXABAN 5 MG: 5 TABLET, FILM COATED ORAL at 09:00

## 2025-02-06 RX ADMIN — ACETAMINOPHEN 650 MG: 325 TABLET, FILM COATED ORAL at 15:54

## 2025-02-06 RX ADMIN — OXYCODONE HYDROCHLORIDE 10 MG: 10 TABLET ORAL at 09:00

## 2025-02-06 RX ADMIN — SODIUM CHLORIDE 500 ML: 9 INJECTION, SOLUTION INTRAVENOUS at 15:56

## 2025-02-06 RX ADMIN — Medication 15 MG: at 17:33

## 2025-02-06 RX ADMIN — APIXABAN 5 MG: 5 TABLET, FILM COATED ORAL at 20:37

## 2025-02-06 RX ADMIN — OXYCODONE HYDROCHLORIDE 10 MG: 10 TABLET ORAL at 04:46

## 2025-02-06 RX ADMIN — OXYCODONE HYDROCHLORIDE 15 MG: 10 TABLET ORAL at 13:43

## 2025-02-06 RX ADMIN — ONDANSETRON 4 MG: 4 TABLET, ORALLY DISINTEGRATING ORAL at 15:07

## 2025-02-06 RX ADMIN — SENNOSIDES AND DOCUSATE SODIUM 1 TABLET: 50; 8.6 TABLET ORAL at 20:34

## 2025-02-06 ASSESSMENT — ACTIVITIES OF DAILY LIVING (ADL)
ADLS_ACUITY_SCORE: 61
ADLS_ACUITY_SCORE: 63
ADLS_ACUITY_SCORE: 61
ADLS_ACUITY_SCORE: 61
ADLS_ACUITY_SCORE: 63
ADLS_ACUITY_SCORE: 63
ADLS_ACUITY_SCORE: 61
ADLS_ACUITY_SCORE: 63
ADLS_ACUITY_SCORE: 61
ADLS_ACUITY_SCORE: 63
ADLS_ACUITY_SCORE: 63
ADLS_ACUITY_SCORE: 61
ADLS_ACUITY_SCORE: 63
ADLS_ACUITY_SCORE: 61
DEPENDENT_IADLS:: CLEANING;COOKING;LAUNDRY;SHOPPING;MEAL PREPARATION;MEDICATION MANAGEMENT;TRANSPORTATION;INCONTINENCE
ADLS_ACUITY_SCORE: 61
ADLS_ACUITY_SCORE: 63
ADLS_ACUITY_SCORE: 61

## 2025-02-06 NOTE — PROGRESS NOTES
[facility]  Patient is currently receiving services with [facility]. The patient is currently receiving RN PT OT services. Patient's  and home health team have been notified that patient is under inpatient status. [facility] Liaison will continue to follow patient. Please provide orders to resume home care at time of discharge if appropriate.

## 2025-02-06 NOTE — CONSULTS
[facility] Nurse Inpatient Assessment     Consulted for: coccyx    Summary: Site of previously healed pressure injury, seen on last admit 1/27/25, now with 3 open areas.     [facility] nurse follow-up plan: weekly    Patient History (according to provider note(s):      Alis Hartman is a 69 year old woman admitted on 2/5/2025. She has a history of paranoid schizophrenia, lower extremity edema, cervical cancer, ESBL UTI, atrial fibrillation, gastric bypass and CKD who is admitted from home with worsening BLE edema and new coccyx sores.  The patient was just in the hospital from 1/25-27 with hematuria and pyuria.  She had her catheter exchanged and while PT/OT recommend TCU she elected to return home.     Assessment:      Areas visualized during today's visit: Sacrum/coccyx    Pressure Injury Location: Sacrum/coccyx    Last photo: 2/6/25  Wound type: Pressure Injury and Shear     Pressure Injury Stage: site of previously healed pressure injury, present on admission     Wound history/plan of care:   unknown worst stage, currently appears to be a stage 2 but was possibly deeper. Was noted as a reinjury on her last assessment here 12/4/24 and 1/27/25  Wound base: 100 % Dermis     Palpation of the wound bed: normal      Drainage: small     Description of drainage: serosanguinous     Measurements (length x width x depth, in cm) 0.5  x 0.2  x  0.1 cm, 0.4 x 0.2 x 0.1 cm and 0.4 x 0.3 x 0.1 cm   Periwound skin: Intact and Macerated      Color: normal and consistent with surrounding tissue      Temperature: normal   Odor: none  Pain: moderate, tender  Pain intervention prior to dressing change: slow and gentle cares   Treatment goal: Heal   STATUS: initial assessment  Supplies ordered: discussed with RN and discussed with patient     My PI Risk Assessment     Sensory Perception: 3 - Slightly Limited     Moisture: 2 - Very moist      Activity: 1 - Bedfast      Mobility: 3 -  Slightly limited      Nutrition: 3 - Adequate     Friction/Shear: 2 - Potential problem      TOTAL: 14       Treatment Plan:     Sacrum  Q shift, Every 3 days and as needed  Peel back dressing Q shift for evaluation, reapply or change as needed   Cleanse the area with NS and pat dry.  Apply No sting film barrier to periwound skin.  Dust open areas with ostomy powder and pat into place.   Cover wound with Sacral Mepilex (#[id number])  Turn and reposition Q 2hrs side to side only.  Ensure pt has Tanner-cushion while sitting up in the chair.  Ensure isoflex pump on bed and set  FYI- If pt has constant incontinent loose stools needing dressing changes Q shift please discontinue the Mepilex dressing and apply criticaid barrier paste BID and PRN.      Orders: Written    RECOMMEND PRIMARY TEAM ORDER: None, at this time  Education provided: importance of repositioning, plan of care, and wound progress  Discussed plan of care with: Patient and Nurse  Notify WOC if wound(s) deteriorate.  Nursing to notify the Provider(s) and re-consult the WO Nurse if new skin concern.    DATA:     Current support surface: Standard  Standard gel mattress (Isoflex)  Containment of urine/stool: Incontinence Protocol  BMI: Body mass index is 24.21 kg/m .   Active diet order: Orders Placed This Encounter      Combination Diet Regular Diet Adult     Output: I/O last 3 completed shifts:  In: -   Out: 250 [Urine:250]     Labs:   Recent Labs   Lab 02/05/25  1452   ALBUMIN 1.9*   HGB 10.0*   INR 1.24*   WBC 9.9     Pressure injury risk assessment:   Sensory Perception: 3-->slightly limited  Moisture: 3-->occasionally moist  Activity: 2-->chairfast  Mobility: 3-->slightly limited  Nutrition: 2-->probably inadequate  Friction and Shear: 1-->problem  Yogi Score: 14    Bailee Clark RN CWOCN  Pager no longer is use, please contact through inEarth group: [facility]  Dept. Office Number: [phone]

## 2025-02-06 NOTE — CONSULTS
Care Management Initial Consult    General Information  Assessment completed with: Patient, VM-chart review,    Type of CM/SW Visit: Initial Assessment    Primary Care Provider verified and updated as needed: Yes   Readmission within the last 30 days: previous discharge plan unsuccessful      Reason for Consult: discharge planning, other (see comments) (elevated risk score)  Advance Care Planning: Advance Care Planning Reviewed: present on chart (has POLST; other ACP doc is invalid d/t missing date)          Communication Assessment  Patient's communication style: spoken language (English or Bilingual)    Hearing Difficulty or Deaf: no   Wear Glasses or Blind: yes    Cognitive  Cognitive/Neuro/Behavioral: WDL  Level of Consciousness: alert  Arousal Level: opens eyes spontaneously  Orientation: oriented x 4  Mood/Behavior: calm, cooperative  Best Language: 0 - No aphasia  Speech: spontaneous, logical    Living Environment:   People in home: grandchild(vernell), other (see comments) (grandson, granddtr, niece; dtr visits daily but does not live in the home; other family visit frequently)     Current living Arrangements: house (duplex)      Able to return to prior arrangements: other (see comments) (TBD)       Family/Social Support:  Care provided by: child(vernell), other (see comments) (grandson, granddtr, niece)  Provides care for: no one, unable/limited ability to care for self     Support system: Children, Other (specify) (grandchildren, niece, other family)          Description of Support System: Supportive, Involved    Support Assessment: Adequate family and caregiver support, Adequate social supports (despite strong family support, pt was unsuccessful at home following previous hospital discharge (TCU was recommended, however pt declined this))    Current Resources:   Patient receiving home care services: Yes ([facility] ([place], [place] [facility]  Skilled Home Care Services: Skilled Nursing, Physical Therapy,  Occupational Therapy     Community Resources: DME, Home Care, OP Mental Health  Equipment currently used at home: grab bar, toilet, grab bar, tub/shower, shower chair, walker, rolling, walker, standard, wheelchair, manual (pt reports she is working on getting a power wheelchair)  Supplies currently used at home: Incontinence Supplies    Employment/Financial:  Employment Status: retired        Financial Concerns: unable to afford rent/mortgage (pt interested in resources/info on getting assistance paying rent, as well as applying for county benefits)   Referral to Financial Worker: No       Does the patient's insurance plan have a 3 day qualifying hospital stay waiver?  No    Lifestyle & Psychosocial Needs:  Social Drivers of Health     Food Insecurity: Low Risk  (2/5/2025)    Food Insecurity     Within the past 12 months, did you worry that your food would run out before you got money to buy more?: No     Within the past 12 months, did the food you bought just not last and you didn t have money to get more?: No   Depression: Not at risk (9/4/2024)    PHQ-2     PHQ-2 Score: 0   Housing Stability: Low Risk  (2/5/2025)    Housing Stability     Do you have housing? : Yes     Are you worried about losing your housing?: No   Tobacco Use: Low Risk  (1/25/2025)    Patient History     Smoking Tobacco Use: Never     Smokeless Tobacco Use: Never     Passive Exposure: Not on file   Financial Resource Strain: Low Risk  (2/5/2025)    Financial Resource Strain     Within the past 12 months, have you or your family members you live with been unable to get utilities (heat, electricity) when it was really needed?: No   Alcohol Use: Not on file   Transportation Needs: Low Risk  (2/5/2025)    Transportation Needs     Within the past 12 months, has lack of transportation kept you from medical appointments, getting your medicines, non-medical meetings or appointments, work, or from getting things that you need?: No   Physical Activity:  Not on file   Interpersonal Safety: Low Risk  (2/5/2025)    Interpersonal Safety     Do you feel physically and emotionally safe where you currently live?: Yes     Within the past 12 months, have you been hit, slapped, kicked or otherwise physically hurt by someone?: No     Within the past 12 months, have you been humiliated or emotionally abused in other ways by your partner or ex-partner?: No   Stress: Not on file   Social Connections: Not on file   Health Literacy: Not on file       Functional Status:  Prior to admission patient needed assistance:   Dependent ADLs:: Ambulation-walker, Bathing, Dressing, Positioning, Transfers, Incontinence (pt has been requiring more assist than usual following previous hospital discharge)  Dependent IADLs:: Cleaning, Cooking, Laundry, Shopping, Meal Preparation, Medication Management, Transportation, Incontinence (pt has been requiring more assist than usual following previous hospital discharge)       Mental Health Status:  Mental Health Status: No Current Concerns       Chemical Dependency Status:  Chemical Dependency Status: No Current Concerns             Values/Beliefs:  Spiritual, Cultural Beliefs, Gnosticist Practices, Values that affect care: no               Discussed  Partnership in Safe Discharge Planning  document with patient/family: No    Additional Information:  CM/SW consults received for elevated unplanned readmission risk score and for discharge planning/disposition.    Reached out to [facility] liaison, as recent discharge note indicates pt was receiving services.    10:00am - Case discussed in rounds this AM.  PT, WOC, and lymphedema therapy consults pending at this time.  Pt admitted w/ worsening BLE edema and new coccyx sores.  IZABELLA TBD.    Received update from [facility] liaison that pt is open to their services for RN/PT/OT, serviced by the [facility].    Completed chart review of previous CMA filed 1/26 by Dede THOMAS.    11:20am - Attempted to meet w/  pt, however other staff presently working w/ her, intend to return later.    12:15pm - Met w/ pt at bedside, introduced self and role, conducted CMA, and discussed discharge planning process.  Pt was agreeable to discussion at this time, however seemed tired, so some data was unable to be re-confirmed by pt (so relied on chart review for some info).  See flowsheets for CMA data.  Confirmed home address and PCP.  Regarding financial concerns, pt reported that she would be interested in any resources for assistance in paying her rent.  She was also agreeable to info on how to apply for [facility] benefits (various types of assistance available; cash assistance would help in addressing rent payments).  Informed pt that this writer would have any relevant information on assistance added to her discharge paperwork, pt expressed understanding.  Regarding disposition, pt aware PT will work w/ her and make recommendations.  Pt expressed a strong preference for return to home w/ home care, explaining that she had a previous bad experience w/ TCU.  That being said, pt wasn't entirely opposed to TCU, so this writer suggested pt work w/ PT and discussion on pt's preferences be re-visited later after formal recommendations for disposition are available.  Pt was in agreement, will see how she does w/ PT and consider her options further following that.  Pt had no further questions/concerns for this writer and appreciated the discussion.  Advised pt to alert bedside RN if anything comes up that she would like to speak w/ care coordination team about.    Added information on applying for [facility] [employer] to AVS.      Next Steps:  - Await results of PT eval and address accordingly    - If discharging to home, resume home care services and ensure home care nursing is able to provide wound care/teach family wound care    - Look into programs that may be able to assist w/ rent payments, add any relevant info found to MANUEL JUÁREZ  "LUÍS Faulkner  [facility]  Units 8M/S & 10 ICU  Reachable on [employer] - Search by name or search \"LUÍS\"  Phone: [phone]          "

## 2025-02-06 NOTE — PLAN OF CARE
8MS Admission Note 2300    Reason for admission: Bilateral lower extremity edema and new coccyx wounds.   Primary team notified of pt arrival: yes  Admitted from: [facility]  Via: EMS  Belongings: remains with patient  Admission Required Doc Completed: Yes  Teaching: Orientation to unit and call light- call light within reach, use of console, meal times, when to call for the RN, and enforced importance of safety.  IV Access: port a cath  Telemetry: no  Ht./Wt.: yes  Code Status verified on armband: Yes  2 RN Skin Assessment Completed with: Irais Davila  Suction/Ambu bag/Flowmeter at bedside: Yes    Pt status: buttocks pain, turned to side, eating snack before bed.      Temp:  [97.5  F (36.4  C)-98.3  F (36.8  C)] 97.5  F (36.4  C)  Pulse:  [78-93] 78  Resp:  [16] 16  BP: ()/(65-88) 106/77  SpO2:  [98 %-99 %] 99 %

## 2025-02-06 NOTE — PLAN OF CARE
"Goal Outcome Evaluation: 5299-6329    Plan of Care Reviewed With: patient    Overall Patient Progress: no change    Outcome Evaluation:     Neuro/CMS: A&Ox4.   Resp/Cardiac: Denies SOB, chest pain.   GI/: Continent of bowel and bladder, states last BM was a couple days ago.   Skin: notable for 3 small pressure ulcers on coccyx; 2\"x2.5\" blister on left shin, and other smaller weeping BLE wounds/blisters/skin tears. Some covered with Meplilex. +3-4 edema in BLE.   Activity: poor mobility, not out of bed. Turned side to side q2h.   Pain: Severe pain om buttocks, BLE. PRN oxycodone q3h. Attempted ice, pt did not like.   LDA: Right chest port a cath saline locked.   Diet: Regular.   Other: patient became tearful due to pain in her buttocks and legs. Paged hospitalist for additional pain meds, did not receive any new orders.     Plan: Continue with POC.     "

## 2025-02-06 NOTE — PROGRESS NOTES
[facility]    Medicine Progress Note - Hospitalist Service, GOLD TEAM 22    Date of Admission:  2/5/2025    Assessment & Plan      Alis Hartman is a 69 year old woman admitted on 2/5/2025. 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     Today   - Cr not much improved, FeNa + small IVF   - renal US to assess if recurrence of hydronephrosis a concern   - adjust pain medications   - ordered nutrition supplements   - follow up WOC, lymphedema consults   - may be overdue for suprapubic cath exchange, will investigate     # Chronic bilateral lower extremity edema  # Deconditioning  # Sacral pressure ulcer, POA, worsening   Hx of chronic edema, no PTA diuretics. Echo 11/24 reassuring EF but poor study, cant comment on diastolic function, edema likely due to some degree of proteinuria and low oncotic pressure with low albumin/malnutrition.  Physical and occupational therapy were consulted during this admission, and gradient compression bandaging applied. Initial recommendation was to transitional care unit last hospitalization, however per patient preference and for her mental health discharged to home with home care and family support in place. Unfortunately symptoms continued to progress at home   - Consulted lymphedema  - Consulted WOC RN  - Consulted PT  - Consulted social work     # MADHU on CKD3    Cr worsened on admission compared to prior, slowly worsening since 1/25. May be consistent with prerenal but didn't improve much with small IVF overnight. History of  hydronephrosis related to her endometrial cancer s/p PNT  removal, L just in December 2024. She has a chronic suprapubic catheter that appears to be functioning well.    - Cr not much improved today, obtain FeNa + small IVF   - renal US to assess if recurrence of hydronephrosis a concern   - may be overdue for suprapubic cath exchange, will investigate     #Hx of ESBL urosepsis and bacteremia  #Bladder dysfunction s/p cystostomy and suprapubic catheter  Recently admitted 11/26 - 12/8/2024 for ESBL urosepsis and bacteremia requiring ICU w/ L nephrostomy tube (removed 1/7) treated w/ ertapenem, returned 1/25-1/27 for concern for UTI but no clear infection at that time.  ID was consulted that admission and recommended avoiding frequent UA and Ucx checks in future unless patient w/ symptoms of UTI.   - UA obtained and abnormal at admission, will continue to monitor off antibiotics   - appears she was supposed to have catheter exchanged 1/29 with urology, will investigate if this occurred and possible consult them to do while hospitalized     #Serous endometrial carcinoma  Follows w/ heme/onc through [place]. S/p SNEHA, BSO 07/2024 s/p cycle 3 carbo/taxel 10/15/2024, cycle 4 delayed in s/o recent admission, family ultimately decided to forgo any further treatment.      #Chronic anemia, stable  Likely 2/2 renal disease. Hb higher than baseline likely in setting of dehydration, monitor      #Atrial fibrillation - Continued PTA apixaban    #Chronic abdominal pain - Continued PTA oxycodone 15 mg     #Hx Kiko-en-Y gastric bypass  #Severe malnutrition in the context of chronic illness   #Hyponatremia  - PTA on vitamin B12 injections monthly   - has required TF in the past and needed monitoring for electrolyte derangements, will monitor closely and consider RD consult, ordered her preferred supplements for now     # History of schizophrenia: Not currently on any medications     Diet: Combination Diet Regular Diet Adult  Snacks/Supplements Adult: Ensure Enlive; With Meals    DVT  Prophylaxis: DOAC  Shahid Catheter: Not present  Lines: PRESENT      Port a Cath 02/05/25 Single Lumen Right Chest wall-Site Assessment: WDL      Cardiac Monitoring: None  Code Status: Full Code      Clinically Significant Risk Factors Present on Admission         # Hyponatremia: Lowest Na = 128 mmol/L in last 2 days, will monitor as appropriate  # Hypochloremia: Lowest Cl = 96 mmol/L in last 2 days, will monitor as appropriate      # Hypoalbuminemia: Lowest albumin = 1.9 g/dL at 2/5/2025  2:52 PM, will monitor as appropriate  # Drug Induced Coagulation Defect: home medication list includes an anticoagulant medication    # Hypertension: Noted on problem list      # Anemia: based on hgb <11           # Financial/Environmental Concerns:           Social Drivers of Health            Disposition Plan     Medically Ready for Discharge: Anticipated in 2-4 Days       Tashia Brantley MD  Hospitalist Service, GOLD TEAM [address] [place] [address]  Securely message with [employer] (more info)  Text page via [email or web address] Paging/Directory   See signed in provider for up to date coverage information  ______________________________________________________________________    Interval History    No acute events overnight, nursing notes reviewed.     Feeling overall uncomfortable this morning, having pain primarily in her backside where the pressure sores are. Leg swelling is also really painful, along with intermittent abdominal pain that is more chronic. Cristiniterates was moving a little at home, but quickly became bedbound despite her family's attentive efforts. Tolerating PO intake, likes shakes, but oranges may have hurt her gums.     5-pt ROS otherwise negative       Physical Exam   Vital Signs: Temp: 97.5  F (36.4  C) Temp src: Oral BP: 94/73 Pulse: 100   Resp: 17 SpO2: 96 % O2 Device: None (Room air)    Weight: 136 lbs 10.96 oz     Gen: alert, interactive and pleasant, lying in bed in no acute distress,  chronically ill appearing   HEENT: Normocephalic/atraumatic, sclera clear, edentulous, oropharynx with mildly dry mucous membranes  Resp: Clear bilaterally anteriorly, easy work of breathing on room air, no wheezing, shallow breathing   CV: Regular rate and rhythm, no murmurs noted    Abd: soft, diffusely tender, nondistended. Suprapubic cath in place without bleeding noted   Ext: 3+ lower extremity edema, large weeping blister on L shin  Neuro: Alert and oriented x4, grossly non-focal, generalized weakness but moving all extremities equally       Medical Decision Making       60 MINUTES SPENT BY ME on the date of service doing chart review, history, exam, documentation & further activities per the note.      Data     I have personally reviewed the following data over the past 24 hrs:    9.9  \   10.0 (L)   / 269     129 (L) 96 (L) 28.5 (H) /  74   5.3 20 (L) 1.33 (H) \     ALT: 12 AST: 20 AP: 147 TBILI: 0.5   ALB: 1.9 (L) TOT PROTEIN: 5.0 (L) LIPASE: N/A     Procal: N/A CRP: N/A Lactic Acid: 1.9       INR:  1.24 (H) PTT:  N/A   D-dimer:  N/A Fibrinogen:  N/A

## 2025-02-06 NOTE — MEDICATION SCRIBE - ADMISSION MEDICATION HISTORY
Medication Scribe Admission Medication History    Admission medication history is complete. The information provided in this note is only as accurate as the sources available at the time of the update.    Information Source(s): Patient and Family member via in-person    Pertinent Information: Daughter states she is taking oxycodone IR 15 MG tablet at home rather than the oxycodone 5 MG. She reports that the only pain medication her mother taking is the oxycodone and no gabapentin. Per daughter, she receives the B12 injections in clinic every month. Daughter denies she is taking any other medications. Dispense report and outside medication reconciliation list have been reviewed.     Changes made to PTA medication list:  Added:   Oxycodone IR 15 mg every 4 hours as needed (last dispense 1/20/25)  Deleted:   Oxycodone 5 MG tablet DUPLICATE 2x (last dispense 1/21/25)  Changed: None    Allergies reviewed with patient and updates made in EHR: yes    Medication History Completed By: Liz Archer 2/5/2025 9:13 PM    PTA Med List   Medication Sig Note Last Dose/Taking    acetaminophen (TYLENOL) 325 MG tablet Take 2 tablets (650 mg) by mouth every 6 hours as needed for mild pain  2/5/2025 Morning    albuterol (PROAIR HFA/PROVENTIL HFA/VENTOLIN HFA) 108 (90 Base) MCG/ACT inhaler Inhale 1-2 puffs into the lungs every 4 hours as needed for shortness of breath  Unknown    apixaban ANTICOAGULANT (ELIQUIS) 5 MG tablet Take 1 tablet (5 mg) by mouth 2 times daily.  2/5/2025 Morning    calcium Citrate-vitamin D 500-400 MG-UNIT CHEW Take 1 tablet by mouth daily  2/5/2025 Morning    cyanocobalamin (CYANOCOBALAMIN) 1000 MCG/ML injection Inject 1 mL (1,000 mcg) into a muscle every month. 2/5/2025: In clinic injections Taking    diclofenac (VOLTAREN) 1 % topical gel Apply 4 g topically 4 times daily as needed for moderate pain.  Unknown    ferrous sulfate (CASSANDRA-IN-SOL) 75 (15 FE) MG/ML oral drops Take 15 mg by mouth daily  2/5/2025  "Morning    hydrocortisone 2.5 % cream Apply topically as needed  Unknown    lidocaine (XYLOCAINE) 5 % external ointment Apply 1 Application topically as needed  2/4/2025    melatonin 3 MG tablet Take 1 tablet (3 mg) by mouth or Feeding Tube at bedtime.  2/4/2025 Bedtime    naloxone (NARCAN) 4 MG/0.1ML nasal spray Spray 1 spray (4 mg) into one nostril alternating nostrils as needed for opioid reversal every 2-3 minutes until assistance arrives  Taking As Needed    ondansetron (ZOFRAN) 8 MG tablet Take 1 tablet (8 mg) by mouth every 8 hours as needed for nausea  Unknown    oxyCODONE IR (ROXICODONE) 15 MG tablet Take 15 mg by mouth every 4 hours as needed (Chronic Pain).  2/4/2025 Evening    phenol (CHLORASEPTIC) 1.4 % spray Take 1-2 sprays (1-2 mLs) by mouth every hour as needed for sore throat.  Unknown    polyethylene glycol (MIRALAX) 17 GM/Dose powder Take 17 g by mouth daily as needed for constipation  2/1/2025    prochlorperazine (COMPAZINE) 5 MG tablet Take 1-2 tablets (5-10 mg) by mouth every 6 hours as needed for nausea or vomiting  Unknown    senna-docusate (SENOKOT-S/PERICOLACE) 8.6-50 MG tablet Take 1 tablet by mouth 2 times daily  Unknown    Skin Protectants, Misc. (INTERDRY 10\"X36\") SHEE Externally apply 10 each topically every 24 hours. Apply to skin folds on abdomen  2/4/2025    triamcinolone (KENALOG) 0.1 % external ointment Apply topically 3 times daily as needed for irritation.  Unknown     "

## 2025-02-06 NOTE — DISCHARGE INSTRUCTIONS
Applying for County Benefits  Food assistance (SNAP), cash programs, emergency assistance, housing support, childcare assistance  1) You will need a device that can access the internet (smartphone, tablet, etc)  2) You will need electronic copies of documents such as bank statements, rent receipts, or medical bills  3) Visit this website: https://[place]its.[place].gov/  4) Follow the prompts to complete the application.  5) Your application submission is the earliest date your benefits can begin  6) Look for a letter in the mail or a phone call from your [facility] or [facility] nation.  They will reach out for the interview portion.  If you are in need of further support, you can reach out to your [facility]'s Department of Human Services for assistance.

## 2025-02-06 NOTE — PLAN OF CARE
"Goal Outcome Evaluation:      Plan of Care Reviewed With: patient    Outcome Evaluation: Care mgmt following.  TCU vs Home Care pending PT recs and further discussion w/ pt.        LUÍS Medrano  [facility]  Units 8M/S & 10 ICU  Reachable on [employer] - Search by name or search \"RNCC\"  Phone: [phone]    "

## 2025-02-06 NOTE — H&P
[facility]    History and Physical - Hospitalist Service, GOLD TEAM        Date of Admission:  2/5/2025    Assessment & Plan      Alis Hartman is a 69 year old woman admitted on 2/5/2025. She has a history of paranoid schizophrenia, lower extremity edema, cervical cancer, ESBL UTI, atrial fibrillation, gastric bypass and CKD who is admitted from home with worsening BLE edema and new coccyx sores.  The patient was just in the hospital from 1/25-27 with hematuria and pyuria.  She had her catheter exchanged and while PT/OT recommend TCU she elected to return home.    #)  Worsened BLE edema, developing pressure ulcers - The patient just left the hospital on 1/26 and since being home has essentially remained in a chair with almost no mobility.  She has developed worsening lower extremity edema and has three small developing pressure ulcers on her coccyx.  - Consult lymphedema  - Consult WOC RN  - Consult PT  - Consult social work.  Given her extreme lack of mobility and new pressure ulcers I do not think she can manage at home and will need TCU level care on discharge.    #) CKD - Cr slightly worsened today compared to prior.  She has a chronic suprapubic catheter that is functioning well.  No new meds.  - Follow BMP in the am  - I will add on a CK total given immobility and possible rhabdo driving her worsened renal function    #) Hyponatremia, mild - Slightly low at 128 and potentially secondary to dehydration.  I will give a small bolus tonight and recheck in the am.  If not improving, would obtain urine osm, serum osm and urine sodium in the am.  I suspect she is overall volume up, but that most of this fluid is currently in her legs.  - BMP in am  - 500 cc bolus now    #) History of schizophrenia  - Not currently on any medications    #) History of atrial fibrillation  - Continue home apixaban             Diet: Regular Diet Adult    DVT Prophylaxis: DOAC  Shahid  "Catheter: Not present  Lines: PRESENT             Cardiac Monitoring: None  Code Status:  Full    Clinically Significant Risk Factors Present on Admission         # Hyponatremia: Lowest Na = 128 mmol/L in last 2 days, will monitor as appropriate       # Hypoalbuminemia: Lowest albumin = 1.9 g/dL at 2/5/2025  2:52 PM, will monitor as appropriate  # Drug Induced Coagulation Defect: home medication list includes an anticoagulant medication    # Hypertension: Noted on problem list      # Anemia: based on hgb <11       # Overweight: Estimated body mass index is 27.63 kg/m  as calculated from the following:    Height as of 1/25/25: 1.6 m (5' 3\").    Weight as of 1/25/25: 70.8 kg (156 lb).       # Financial/Environmental Concerns:           Disposition Plan     Medically Ready for Discharge: Anticipated in 2-4 Days         The patient's care was discussed with the Attending Physician, Dr. Campbell .    ERINN [facility]  Hospitalist Service, [facility]  Securely message with [employer] (more info)  Text page via [facility] Paging/Directory   See signed in provider for up to date coverage information    ______________________________________________________________________    Chief Complaint   Immobility, leg swelling    History is obtained from the patient    History of Present Illness   Alis Hartman is a 69 year old woman admitted on 2/5/2025. She has a history of paranoid schizophrenia, lower extremity edema, cervical cancer, ESBL UTI, atrial fibrillation, gastric bypass and CKD who is admitted from home with worsening BLE edema and new coccyx sores.  The patient was just in the hospital from 1/25-27 with hematuria and pyuria.  She had her catheter exchanged and while PT/OT recommend TCU she elected to return home.    The patient and her son tell me that since leaving the hospital on 1/27 she has been essentially immobile in a chair.  Family has been " able to stand her up to use a walker, but she cannot keep herself up for more than a minute.  She does not believe she has walked since getting out of the hospital.    She has had leg swelling for some time, but now her legs have swollen more and started to blister.  Family has also noted new sores on her coccyx.    She denies any fevers, rigors, vomiting or diarrhea.  She has had some nausea.      Past Medical History    Past Medical History:   Diagnosis Date    Atrial fibrillation (H)     Depression     Hypertension     Malignant neoplasm of endocervix (H)     Tx with radiation    Near syncope 11/7/2024    Orthopnea 9/21/2024    Other chronic pain     Low back    Schizophrenia (H)     Urinary incontinence        Past Surgical History   Past Surgical History:   Procedure Laterality Date    ABDOMINOPLASTY      BIOPSY CERVICAL, LOCAL EXCISION, SINGLE/MULTIPLE  10/26/2011    Procedure:BIOPSY CERVICAL, LOCAL EXCISION, SINGLE/MULTIPLE; EUA, cervical biopsies; Surgeon:BETTY TINEO; Location:[facility] OR    BIOPSY VAGINAL N/A 06/08/2021    Procedure: BIOPSY, VAGINA;  Surgeon: Dany Perez MD;  Location: [facility] OR    CYSTOSCOPY N/A 05/17/2024    Procedure: Cystoscopy;  Surgeon: Dany Perez MD;  [facility]: [facility]    CYSTOSTOMY, INSERT TUBE SUPRAPUBIC, COMBINED  07/12/2024    Procedure: Insertion of supra-pubic catheter;  Surgeon: Dany Perez MD;  [facility]: [facility]    DILATION AND CURETTAGE, WITH ULTRASOUND GUIDANCE N/A 05/17/2024    Procedure: Dilation and curettage of the uterus with drainage of uterine fluid under ultrasound guidance, lysis of vaginal adhesions;  Surgeon: Dany Perez MD;  [facility]    EXAM UNDER ANESTHESIA PELVIC N/A 03/12/2020    Procedure: Exam under anesthsia, vaginal biopsies, possible CO2 laser of the vagina;  Surgeon: Lilliam Roy MD;  [facility]    GI SURGERY  2004    gastric bypass    HYSTERECTOMY TOTAL ABDOMINAL, BILATERAL SALPINGO-OOPHORECTOMY, COMBINED Bilateral  "07/12/2024    Procedure: Diagnostic laparoscopy converted to exploratory laparotomy, total abdominal hysterectomy, bilateral salpingo-oophorectomy, lysis of adhesions >60 min;  Surgeon: Dany Perez MD;  [facility]    INSERT PORT VASCULAR ACCESS N/A 08/26/2024    Procedure: Insert port vascular access;  Surgeon: Avery Gregory MD;  [facility]    IR CHEST PORT PLACEMENT > 5 YRS OF AGE  08/26/2024    IR NEPHROSTOMY TUBE PLACEMENT LEFT  11/29/2024    LASER CO2 VAGINA N/A 03/02/2015    Procedure: LASER CO2 VAGINA;  Surgeon: Mariela Abdalla MD;  Location: [facility]    LASER CO2 VAGINA N/A 05/24/2019    Procedure: Exam under anesthesia, vaginal biopsies, CO2 laser of the vagina;  Surgeon: Lilliam Roy MD;  Location: [facility]    LASER CO2 VAGINA N/A 06/08/2021    Procedure: Exam under anesthesia, laser ablation of the upper vagina;  Surgeon: Dany Perez MD;  Location: [facility]    PICC DOUBLE LUMEN PLACEMENT Left 11/28/2024    left basilic 5 fr dl power picc 44 cm    REPAIR BLADDER N/A 07/12/2024    Procedure: Repair bladder;  Surgeon: Dany Perez MD;  [facility]: [facility]       Prior to Admission Medications   Prior to Admission Medications   Prescriptions Last Dose Informant Patient Reported? Taking?   Skin Protectants, Misc. (INTERDRY 10\"X36\") SHEE  Self No No   Sig: Externally apply 10 each topically every 24 hours. Apply to skin folds on abdomen   acetaminophen (TYLENOL) 325 MG tablet  Self No No   Sig: Take 2 tablets (650 mg) by mouth every 6 hours as needed for mild pain   albuterol (PROAIR HFA/PROVENTIL HFA/VENTOLIN HFA) 108 (90 Base) MCG/ACT inhaler  Self Yes No   Sig: Inhale 1-2 puffs into the lungs every 4 hours as needed for shortness of breath   apixaban ANTICOAGULANT (ELIQUIS) 5 MG tablet   No No   Sig: Take 1 tablet (5 mg) by mouth 2 times daily.   calcium Citrate-vitamin D 500-400 MG-UNIT CHEW  Self Yes No   Sig: Take 1 tablet by mouth daily   cyanocobalamin (CYANOCOBALAMIN) " 1000 MCG/ML injection  Self Yes No   Sig: Inject 1 mL (1,000 mcg) into a muscle every month.   diclofenac (VOLTAREN) 1 % topical gel   No No   Sig: Apply 4 g topically 4 times daily as needed for moderate pain.   ferrous sulfate (CASSANDRA-IN-SOL) 75 (15 FE) MG/ML oral drops  Self Yes No   Sig: Take 15 mg by mouth daily   hydrocortisone 2.5 % cream  Self Yes No   Sig: Apply topically as needed   lidocaine (XYLOCAINE) 5 % external ointment  Self Yes No   Sig: Apply 1 Application topically as needed   melatonin 3 MG tablet   No No   Sig: Take 1 tablet (3 mg) by mouth or Feeding Tube at bedtime.   naloxone (NARCAN) 4 MG/0.1ML nasal spray  Self No No   Sig: Spray 1 spray (4 mg) into one nostril alternating nostrils as needed for opioid reversal every 2-3 minutes until assistance arrives   ondansetron (ZOFRAN) 8 MG tablet  Self No No   Sig: Take 1 tablet (8 mg) by mouth every 8 hours as needed for nausea   oxyCODONE (ROXICODONE) 5 MG tablet   No No   Sig: Take 1 tablet (5 mg) by mouth every 6 hours as needed for severe pain.   oxyCODONE (ROXICODONE) 5 MG tablet   No No   Sig: Take 1 tablet (5 mg) by mouth every 8 hours.   phenol (CHLORASEPTIC) 1.4 % spray   No No   Sig: Take 1-2 sprays (1-2 mLs) by mouth every hour as needed for sore throat.   polyethylene glycol (MIRALAX) 17 GM/Dose powder  Self No No   Sig: Take 17 g by mouth daily as needed for constipation   prochlorperazine (COMPAZINE) 5 MG tablet  Self No No   Sig: Take 1-2 tablets (5-10 mg) by mouth every 6 hours as needed for nausea or vomiting   senna-docusate (SENOKOT-S/PERICOLACE) 8.6-50 MG tablet  Self No No   Sig: Take 1 tablet by mouth 2 times daily   triamcinolone (KENALOG) 0.1 % external ointment  Self No No   Sig: Apply topically 3 times daily as needed for irritation.      Facility-Administered Medications: None           Physical Exam   Vital Signs: Temp: 97.7  F (36.5  C) Temp src: Oral BP: 94/69 Pulse: 87   Resp: 16          Physical Exam   Constitutional:    Chronically ill appearing older woman in no acute distress  Pulmonary/Chest: Clear to auscultation bilaterally, with no wheezes or retractions. No respiratory distress.  GI: Soft with good bowel sounds.  Non-tender, non-distended, with no guarding, no rebound, no peritoneal signs.   Musculoskeletal:  4+ blistering BLE edema   Skin: Skin is warm and dry. No rash noted. 3 small developing pressure ulcers on her coccyx.  Neurological: Alert and oriented to person, place, and time. Nonfocal exam  Psychiatric:  Normal mood and affect.      Medical Decision Making       40 MINUTES SPENT BY ME on the date of service doing chart review, history, exam, documentation & further activities per the note.      Data   CBC, BMP, UA, CXR, lower extremity US reviewed.  Prior DC summary reviewed.

## 2025-02-06 NOTE — PLAN OF CARE
"Goal Outcome Evaluation:      Plan of Care Reviewed With: patient    Overall Patient Progress: no change    Outcome Evaluation: Pt is A&Ox4. Has wound on BLE, sacrum/coccyx. BLE edema. Has suprapubic catheter. Needs repositioning q2h. Takes oxycone for pain management.    VS:       Pt A/O X 4. Afebrile. VSS. BP 94/73 (BP Location: Right arm)   Pulse 100   Temp 97.5  F (36.4  C) (Oral)   Resp 17   Ht 1.6 m (5' 3\")   Wt 62 kg (136 lb 11 oz)   SpO2 96%   BMI 24.21 kg/m    Denies nausea, shortness of breath, and chest pain.     Output:     Continent of bowel and has a suprapubic catheter.      Activity:       Pt up AX1 with walker and gait belt.     Skin: BLE edema, blister below the knee of LLE, weeping BLE, open wound on RLE and three open wounds on coccyx.     Pain:       Has pain in the bottom and given PRN oxycodone and is tolerating well.      CMS:       CMS and Neuro's are intact. Denies numbness and tingling in all extremities.      Dressing:     Sacral mepilex on coccyx, mepilex on RLE is clean, dry and intact.      Diet:     Pt is on a regular diet and appetite was fair this shift.       LDA:     Pt has a R chest wall port saline locked.     Equipment:     Bilateral heels are elevated off the bed with pillows.     Plan:       Pt is able to make needs known and the call light is within the pt's reach. Continue to monitor.       Additional Info:     Pt had a  WOC consult. Wound care orders placed. Pt needs repositioning q2h. Labs collected         "

## 2025-02-07 ENCOUNTER — APPOINTMENT (OUTPATIENT)
Dept: ULTRASOUND IMAGING | Facility: CLINIC | Age: 70
DRG: 682 | End: 2025-02-07
Attending: INTERNAL MEDICINE
Payer: MEDICARE

## 2025-02-07 ENCOUNTER — APPOINTMENT (OUTPATIENT)
Dept: OCCUPATIONAL THERAPY | Facility: CLINIC | Age: 70
DRG: 682 | End: 2025-02-07
Attending: NURSE PRACTITIONER
Payer: MEDICARE

## 2025-02-07 ENCOUNTER — APPOINTMENT (OUTPATIENT)
Dept: PHYSICAL THERAPY | Facility: CLINIC | Age: 70
DRG: 682 | End: 2025-02-07
Attending: NURSE PRACTITIONER
Payer: MEDICARE

## 2025-02-07 LAB
ALBUMIN MFR UR ELPH: 289.4 MG/DL
ALBUMIN UR-MCNC: 200 MG/DL
ANION GAP SERPL CALCULATED.3IONS-SCNC: 7 MMOL/L (ref 7–15)
APPEARANCE UR: ABNORMAL
BACTERIA #/AREA URNS HPF: ABNORMAL /HPF
BACTERIA UR CULT: ABNORMAL
BACTERIA UR CULT: ABNORMAL
BILIRUB UR QL STRIP: NEGATIVE
BUN SERPL-MCNC: 30.5 MG/DL (ref 8–23)
CALCIUM SERPL-MCNC: 8 MG/DL (ref 8.8–10.4)
CHLORIDE SERPL-SCNC: 96 MMOL/L (ref 98–107)
COLOR UR AUTO: ABNORMAL
CREAT SERPL-MCNC: 1.44 MG/DL (ref 0.51–0.95)
CREAT UR-MCNC: 71.3 MG/DL
EGFRCR SERPLBLD CKD-EPI 2021: 39 ML/MIN/1.73M2
ERYTHROCYTE [DISTWIDTH] IN BLOOD BY AUTOMATED COUNT: 17.6 % (ref 10–15)
GLUCOSE SERPL-MCNC: 90 MG/DL (ref 70–99)
GLUCOSE UR STRIP-MCNC: NEGATIVE MG/DL
HCO3 SERPL-SCNC: 23 MMOL/L (ref 22–29)
HCT VFR BLD AUTO: 27.4 % (ref 35–47)
HGB BLD-MCNC: 9.4 G/DL (ref 11.7–15.7)
HGB UR QL STRIP: ABNORMAL
HYALINE CASTS: 242 /LPF
KETONES UR STRIP-MCNC: NEGATIVE MG/DL
LEUKOCYTE ESTERASE UR QL STRIP: ABNORMAL
MCH RBC QN AUTO: 29.5 PG (ref 26.5–33)
MCHC RBC AUTO-ENTMCNC: 34.3 G/DL (ref 31.5–36.5)
MCV RBC AUTO: 86 FL (ref 78–100)
NITRATE UR QL: NEGATIVE
OSMOLALITY SERPL: 385 MMOL/KG (ref 280–301)
OSMOLALITY UR: 366 MMOL/KG (ref 100–1200)
PH UR STRIP: 5.5 [PH] (ref 5–7)
PHOSPHATE SERPL-MCNC: 4 MG/DL (ref 2.5–4.5)
PLATELET # BLD AUTO: 265 10E3/UL (ref 150–450)
POTASSIUM SERPL-SCNC: 4.5 MMOL/L (ref 3.4–5.3)
POTASSIUM UR-SCNC: 60.7 MMOL/L
PROT/CREAT 24H UR: 4.06 MG/MG CR (ref 0–0.2)
RBC # BLD AUTO: 3.19 10E6/UL (ref 3.8–5.2)
RBC URINE: >182 /HPF
SODIUM SERPL-SCNC: 126 MMOL/L (ref 135–145)
SODIUM UR-SCNC: 33 MMOL/L
SP GR UR STRIP: 1.02 (ref 1–1.03)
SQUAMOUS EPITHELIAL: 8 /HPF
UROBILINOGEN UR STRIP-MCNC: NORMAL MG/DL
WBC # BLD AUTO: 9.4 10E3/UL (ref 4–11)
WBC CLUMPS #/AREA URNS HPF: PRESENT /HPF
WBC URINE: >182 /HPF
YEAST #/AREA URNS HPF: ABNORMAL /HPF

## 2025-02-07 PROCEDURE — 76770 US EXAM ABDO BACK WALL COMP: CPT

## 2025-02-07 PROCEDURE — 84300 ASSAY OF URINE SODIUM: CPT | Performed by: INTERNAL MEDICINE

## 2025-02-07 PROCEDURE — 250N000013 HC RX MED GY IP 250 OP 250 PS 637: Performed by: NURSE PRACTITIONER

## 2025-02-07 PROCEDURE — 120N000002 HC R&B MED SURG/OB UMMC

## 2025-02-07 PROCEDURE — 83930 ASSAY OF BLOOD OSMOLALITY: CPT | Performed by: INTERNAL MEDICINE

## 2025-02-07 PROCEDURE — 250N000013 HC RX MED GY IP 250 OP 250 PS 637: Performed by: INTERNAL MEDICINE

## 2025-02-07 PROCEDURE — 97530 THERAPEUTIC ACTIVITIES: CPT | Mod: GP

## 2025-02-07 PROCEDURE — 85014 HEMATOCRIT: CPT | Performed by: INTERNAL MEDICINE

## 2025-02-07 PROCEDURE — 97140 MANUAL THERAPY 1/> REGIONS: CPT | Mod: GO

## 2025-02-07 PROCEDURE — P9047 ALBUMIN (HUMAN), 25%, 50ML: HCPCS | Mod: JZ | Performed by: INTERNAL MEDICINE

## 2025-02-07 PROCEDURE — 84156 ASSAY OF PROTEIN URINE: CPT | Performed by: INTERNAL MEDICINE

## 2025-02-07 PROCEDURE — 99222 1ST HOSP IP/OBS MODERATE 55: CPT

## 2025-02-07 PROCEDURE — 83935 ASSAY OF URINE OSMOLALITY: CPT | Performed by: INTERNAL MEDICINE

## 2025-02-07 PROCEDURE — 99233 SBSQ HOSP IP/OBS HIGH 50: CPT | Performed by: INTERNAL MEDICINE

## 2025-02-07 PROCEDURE — 97166 OT EVAL MOD COMPLEX 45 MIN: CPT | Mod: GO

## 2025-02-07 PROCEDURE — 97162 PT EVAL MOD COMPLEX 30 MIN: CPT | Mod: GP

## 2025-02-07 PROCEDURE — 81001 URINALYSIS AUTO W/SCOPE: CPT | Performed by: INTERNAL MEDICINE

## 2025-02-07 PROCEDURE — 84100 ASSAY OF PHOSPHORUS: CPT | Performed by: INTERNAL MEDICINE

## 2025-02-07 PROCEDURE — 84133 ASSAY OF URINE POTASSIUM: CPT

## 2025-02-07 PROCEDURE — 76770 US EXAM ABDO BACK WALL COMP: CPT | Mod: 26 | Performed by: RADIOLOGY

## 2025-02-07 PROCEDURE — 250N000011 HC RX IP 250 OP 636: Mod: JW | Performed by: INTERNAL MEDICINE

## 2025-02-07 PROCEDURE — 84295 ASSAY OF SERUM SODIUM: CPT | Performed by: INTERNAL MEDICINE

## 2025-02-07 PROCEDURE — 97116 GAIT TRAINING THERAPY: CPT | Mod: GP

## 2025-02-07 PROCEDURE — 36591 DRAW BLOOD OFF VENOUS DEVICE: CPT | Performed by: INTERNAL MEDICINE

## 2025-02-07 PROCEDURE — 97535 SELF CARE MNGMENT TRAINING: CPT | Mod: GO

## 2025-02-07 PROCEDURE — 85027 COMPLETE CBC AUTOMATED: CPT | Performed by: INTERNAL MEDICINE

## 2025-02-07 PROCEDURE — 80048 BASIC METABOLIC PNL TOTAL CA: CPT | Performed by: INTERNAL MEDICINE

## 2025-02-07 RX ORDER — MULTIPLE VITAMINS W/ MINERALS TAB 9MG-400MCG
1 TAB ORAL DAILY
Status: DISPENSED | OUTPATIENT
Start: 2025-02-07

## 2025-02-07 RX ORDER — ALBUMIN (HUMAN) 12.5 G/50ML
50 SOLUTION INTRAVENOUS ONCE
Status: COMPLETED | OUTPATIENT
Start: 2025-02-07 | End: 2025-02-07

## 2025-02-07 RX ORDER — HYDROMORPHONE HYDROCHLORIDE 2 MG/1
2 TABLET ORAL
Status: DISCONTINUED | OUTPATIENT
Start: 2025-02-07 | End: 2025-02-08

## 2025-02-07 RX ADMIN — ACETAMINOPHEN 650 MG: 325 TABLET, FILM COATED ORAL at 21:41

## 2025-02-07 RX ADMIN — APIXABAN 5 MG: 5 TABLET, FILM COATED ORAL at 08:31

## 2025-02-07 RX ADMIN — OXYCODONE HYDROCHLORIDE 15 MG: 10 TABLET ORAL at 03:37

## 2025-02-07 RX ADMIN — ACETAMINOPHEN 650 MG: 325 TABLET, FILM COATED ORAL at 08:31

## 2025-02-07 RX ADMIN — APIXABAN 5 MG: 5 TABLET, FILM COATED ORAL at 20:15

## 2025-02-07 RX ADMIN — ALBUMIN (HUMAN) 50 G: 0.25 INJECTION, SOLUTION INTRAVENOUS at 12:59

## 2025-02-07 RX ADMIN — Medication 1 MG: at 21:42

## 2025-02-07 RX ADMIN — SENNOSIDES AND DOCUSATE SODIUM 1 TABLET: 50; 8.6 TABLET ORAL at 08:31

## 2025-02-07 RX ADMIN — Medication 1 TABLET: at 13:30

## 2025-02-07 RX ADMIN — ACETAMINOPHEN 650 MG: 325 TABLET, FILM COATED ORAL at 12:46

## 2025-02-07 RX ADMIN — HYDROMORPHONE HYDROCHLORIDE 0.3 MG: 1 INJECTION, SOLUTION INTRAMUSCULAR; INTRAVENOUS; SUBCUTANEOUS at 20:16

## 2025-02-07 RX ADMIN — Medication 1 MG: at 01:48

## 2025-02-07 RX ADMIN — Medication 15 MG: at 08:31

## 2025-02-07 RX ADMIN — OXYCODONE HYDROCHLORIDE 15 MG: 10 TABLET ORAL at 12:46

## 2025-02-07 RX ADMIN — OXYCODONE HYDROCHLORIDE 15 MG: 10 TABLET ORAL at 08:35

## 2025-02-07 RX ADMIN — HYDROMORPHONE HYDROCHLORIDE 2 MG: 2 TABLET ORAL at 16:32

## 2025-02-07 RX ADMIN — ACETAMINOPHEN 650 MG: 325 TABLET, FILM COATED ORAL at 16:32

## 2025-02-07 ASSESSMENT — ACTIVITIES OF DAILY LIVING (ADL)
ADLS_ACUITY_SCORE: 65
ADLS_ACUITY_SCORE: 64
ADLS_ACUITY_SCORE: 65
ADLS_ACUITY_SCORE: 64
ADLS_ACUITY_SCORE: 63
ADLS_ACUITY_SCORE: 64

## 2025-02-07 NOTE — PROGRESS NOTES
"CLINICAL NUTRITION SERVICES - ASSESSMENT NOTE    RECOMMENDATIONS FOR MDs/PROVIDERS TO ORDER:  - notified via [employer] re: pt's c/o oral pain and noted possible lesion on floor of mouth    Malnutrition Status:    Severe malnutrition in the context of chronic illness    Registered Dietitian Interventions:  - start multivitamin w/ minerals  - adjust Ensure to BID--one chocolate Ensure Enlive and one strawberry Ensure Plus HP.  - Collaborate with other providers--notified provider of pt's mouth pain.   Future/Additional Recommendations:  - Monitor renal labs, oral intake and mouth pain, fluid balance, GI function.     REASON FOR ASSESSMENT  Positive admission nutrition risk screen--wt loss of 14-23 lb and decreased po intake    SUBJECTIVE INFORMATION  Assessed patient in room.    PERTINENT PMH AND OTHER RELATED DATA  history of paranoid schizophrenia, lower extremity edema, endometrial adenocarcinoma of uterus s/p SNEHA, BSO (7/12/2024) s/p Cycle 3 carbo/taxel (10/15/24), cystotomy s/p suprapubic catheter w/ hx of ESBL urosepsis & bacteremia, gastric bypass and CKD 3a, anemia who is admitted from home with worsening BLE edema.      NUTRITION HISTORY  H/o very poor intake w/ admission to [facility] in late November requiring TF via NJ starting 11/28.  This was transitioned to cyclic to promote oral intake but continued poor so NJ remained with discharge to TCU on 12/8.  ED notes mention NJ fell out 12/10 at TCU and was replaced 12/13 w/ noted continued poor intake.  Per oncologist clinic note, malnutrition discussed and pt refused a permanent PEJ but agreed to keep current NJ in during TCU stay.  Pt reported good intake since NJ removed and discharged home at 1/14 oncology appointment.      Pt lives at home w/ grandchildren/niece and they provide her with food.  Aranza notes eating well and often to avoid \"that tube\".  Notes she often eats overnight--mentioned a lot of food, sandwiches.  Includes protein foods with meals.  She " "wasn't using supplement drinks at home but mentioned the doctor was supposed to get it ordered for her but she never received it.  Suspect it wasn't covered by her insurance as often viewed as \"groceries\".      Aranza c/o pain on front gums, roof of mouth and behind gum line on bottom that makes eating harder foods difficult.  She states this only started about 2 days ago and doesn't feel it's related to eating w/o her dentures as she often eats w/o them at home also.  Nothing seen with exam except for white area between bottom gumline and tongue that almost looked like a shallow hole.  Nothing noted on upper gums or behind. Gums/tongue generally pale w/ anemia.     CURRENT NUTRITION ORDERS  Diet: Regular  ONS: Ensure Enlive w/ meals \"likes chocolate or strawberry\" ordered by provider 2/6.    CURRENT INTAKE/TOLERANCE  Fair/50% noted with lunch 2/6.  Pt ordering > 1000 kcal/meal and usually > 40 g protein.  She wasn't able to tell me % of meal intake but her appetite is good and mentioned main reason for eating less was pain with some harder foods.  She had a second breakfast tray ordered this morning.    LABS  Nutrition-relevant labs: Reviewed   Latest Reference Range & Units 02/05/25 14:52 02/06/25 07:22 02/06/25 19:11 02/07/25 06:12   Sodium 135 - 145 mmol/L 128 (L) 129 (L) 128 (L) 126 (L)   Potassium 3.4 - 5.3 mmol/L 4.9 5.3  4.5   Urea Nitrogen 8.0 - 23.0 mg/dL 29.3 (H) 28.5 (H)  30.5 (H)   Creatinine 0.51 - 0.95 mg/dL 1.36 (H) 1.33 (H) 1.40 (H) 1.44 (H)   Phosphorus 2.5 - 4.5 mg/dL  4.2  4.0   (L): Data is abnormally low  (H): Data is abnormally high     Latest Reference Range & Units 01/25/25 12:54   Urea Nitrogen 8.0 - 23.0 mg/dL 11.3   Creatinine 0.51 - 0.95 mg/dL 0.98 (H)   Cystatin C 0.6 - 1.0 mg/L 1.6 (H)   GFR Calculated with Cystatin C >=60 mL/min/1.73m2 37 (L)   (H): Data is abnormally high  (L): Data is abnormally low      MEDICATIONS  Nutrition-relevant medications: Reviewed  Including IV albumin " "today   Liquid iron (15 mg elemental iron)  Senna-docusate 2x/d  500 ml NS bolus 2/6    ANTHROPOMETRICS  Height: 160 cm (5' 3\")  Most Recent Weight: 62 kg (136 lb 11 oz)  IBW: 52.3 kg  % IBW: 119% skewed by anasarca--3+-4+ BLE edema with severe muscle wasting noted  BMI (kg/m ): Normal BMI--skewed by anasarca  Weight History:   Wt Readings from Last 30 Encounters: Wt method/source   02/05/25 62 kg (136 lb 11 oz) Bed scale   01/25/25 70.8 kg (156 lb) Stated?   01/14/25 70.8 kg (156 lb) Stated?   01/07/25 70.8 kg (156 lb) Stated?   12/30/24 66.2 kg (146 lb) TCU   12/12/24 85.3 kg (188 lb)    12/08/24 85.5 kg (188 lb 7.9 oz) [place], significant anasarca/ 4+ BLE noted   11/09/24 63.2 kg (139 lb 5.3 oz)    11/04/24 63.5 kg (140 lb) Infusion therapy   10/15/24 63.5 kg (140 lb) \"   10/10/24 63.5 kg (140 lb) Data entered from prior source--no encounter this date   09/27/24 58.8 kg (129 lb 9.6 oz) Out[facility]   09/09/24 68.9 kg (151 lb 12.8 oz) Similar to standing wt from 8/2 during stay   09/04/24 63.5 kg (140 lb) Stated?   08/27/24 63.5 kg (140 lb) Stated?   08/26/24 63.5 kg (140 lb) Stated?   08/22/24 63.5 kg (140 lb) Stated virtual visit   08/20/24 62.3 kg (137 lb 6.4 oz)    08/09/24 67.7 kg (149 lb 3.2 oz)    07/25/24 75 kg (165 lb 6.4 oz)    07/15/24 81.3 kg (179 lb 3.7 oz)    05/28/24 73 kg (161 lb)    05/17/24 74.4 kg (164 lb 0.4 oz)    05/15/24 75.3 kg (166 lb 1.6 oz)    05/15/24 74.8 kg (165 lb)    04/23/24 76.2 kg (168 lb)    04/11/24 76.1 kg (167 lb 12.8 oz)    02/28/24 75.9 kg (167 lb 6.4 oz)    05/23/23 93.3 kg (205 lb 9.6 oz)    02/21/23 98.9 kg (218 lb)      Weight Assessment:  Net wt loss of 18.3% but true loss likely >> 20% with current 3+/4+ BLE  Difficult to  true weight loss within past year as confounded by stated weights and varying levels of edema.     Dosing Weight: 62 kg, based on actual wt bed scale w/ severe BLE     ASSESSED NUTRITION NEEDS  Estimated Energy Needs: 1550 - 1860 kcals/day (25 - " 30 kcals/kg) likely = >30++kcal/kg w/ true dry wt  Justification: Maintenance and Repletion  Estimated Protein Needs: 74 -87 grams protein/day (1.2 - 1.5 grams of pro/kg) likely = > > 1.5 g Pro/kg w/ true dry weight--pending renal tolerance w/ per kidney perfusion  Justification: CKD, Hypercatabolism with acute illness, and Repletion  Estimated Fluid Needs: 1614-3261 mL/day (25 - 30 mL/kg)  Justification: Maintenance and/or Per provider pending fluid status with significant 3rd spacing.    SYSTEM FINDINGS    Poor dentition--dentures left at home but usually eats fine w/o them.  Pain noted at gums and behind gum line top and bottom at front of mouth.  Small white spot--almost like a shallow hole noted on floor of mouth.  Skin/wounds: per 2/6 WOCN note: Stage 2 PI (was possibly deeper) coccyx (also noted as reinjury 12/4/24 and 1/27/25).  Large draining blister mid-shin, weeping BLE, open area RLE w/ 3+-4+ BLE edema.  GI symptoms: Pt notes small stool yesterday, no reported diarrhea, possible constipation.     MALNUTRITION  % Intake: Decreased intake does not meet criteriaReports eating very well at home, decreased x 2 but likely still adequate here.  % Weight Loss:  significant wt loss masked by continued anasarca/3-4+ BLE edema  Subcutaneous Fat Loss: Orbital: Mild  Muscle Loss: Wasting of the temples (temporalis muscle): Mild, Clavicles (pectoralis and deltoids): Severe, Shoulders (deltoids): Severe, Interosseous muscles: Mild, and Scapula (latissimus dorsi, trapezious, deltoids): Severe  Fluid Accumulation/Edema: Moderately severe, 3+4+ B/l feet  Malnutrition Diagnosis: Severe malnutrition in the context of chronic illness  Malnutrition Present on Admission: Yes    NUTRITION DIAGNOSIS  Malnutrition (undernutrition) related to h/o poor intake w/ increased needs w/ cancer dx as evidenced by severe muscle wasting and severe BLE edema.    INTERVENTIONS  Medical food supplement therapy  See nutrition interventions  "above  Vitamin and mineral supplement therapy    Goals  Improved renal function  Electrolytes WNL  Patient to consume % of nutritionally adequate meal trays TID, or the equivalent with supplements/snacks.     Monitoring/Evaluation  Progress toward goals will be monitored and evaluated per policy.     Eli Castañeda RD, LD   6 & 8 Med/Surg RD  Mon-Fri Vocera: \"6 Med Surg Clinical Dietitian\" or \"8 Med Surg Clinical Dietitian\"  Weekend RD Vocera (Global): \"Weekend Holiday Clinical Dietitian\"        "

## 2025-02-07 NOTE — PROGRESS NOTES
2/7/25  Nephrology consult received. Awaiting results of renal US   Please place on 1200 ml fluid restriction    Marianna Bella CNP  Nephrology

## 2025-02-07 NOTE — PROGRESS NOTES
"   02/07/25 7504   Appointment Info   Signing Clinician's Name / Credentials (PT) Cathleen Reeder, PT, DPT   Living Environment   People in Home grandchild(vernell);child(vernell), adult   Current Living Arrangements other (see comments)  (townhouse)   Home Accessibility stairs to enter home   Number of Stairs, Main Entrance 7   Stair Railings, Main Entrance railing on left side (ascending);railings safe and in good condition   Living Environment Comments Pt lives in a townhouse with multiple family members. 7 ABBIE total with 2 steps, landing, and additional 5 steps. All needs met on main level. Pt was recently hospitalized, refused TCU, and returned to hospital. Pt reported interest in getting ramp to enter house but wasn't sure if she had the \"funds\" to do so.   Self-Care   Usual Activity Tolerance poor   Current Activity Tolerance poor   Regular Exercise No   Equipment Currently Used at Home wheelchair, manual;walker, rolling;shower chair  (4WW)   Fall history within last six months no   Activity/Exercise/Self-Care Comment Pt reports that upon discharge from previous admission she was relying heavily on assistance from family for all mobility. She reports walking very short distances with assist from family and FWW. Has a WC at home that she uses from time to time but currently no ramp to enter house.   General Information   Onset of Illness/Injury or Date of Surgery 02/05/25   Referring Physician Jose Nunez APRN CNP   Patient/Family Therapy Goals Statement (PT) To return home   Pertinent History of Current Problem (include personal factors and/or comorbidities that impact the POC) Alis Hartman is a 69 year old woman admitted on 2/5/2025. 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   Existing Precautions/Restrictions fall   Weight-Bearing Status - LUE full weight-bearing   Weight-Bearing Status - RUE full weight-bearing   Weight-Bearing Status - LLE full weight-bearing   Weight-Bearing Status - RLE full weight-bearing   General Observations Activity: up with assist   Cognition   Affect/Mental Status (Cognition) WFL;anxious   Orientation Status (Cognition) oriented x 4   Follows Commands (Cognition) WFL   Pain Assessment   Patient Currently in Pain Yes, see Vital Sign flowsheet   Integumentary/Edema   Integumentary/Edema other (describe)   Integumentary/Edema Comments BLE edema (OT consulted for lymphedema management), weeping wound on L shin, additional wounds on R posterior thigh, all covered with bandages   Posture    Posture Forward head position;Protracted shoulders;Kyphosis   Range of Motion (ROM)   ROM Comment BUE/BLE ROM WFL per functional screen   Strength (Manual Muscle Testing)   Strength (Manual Muscle Testing) Deficits observed during functional mobility   Strength Comments grossly deconditioned, BLE strength >3/5   Bed Mobility   Bed Mobility sit-supine;supine-sit   Supine-Sit [place] (Bed Mobility) moderate assist (50% patient effort)  (required assist to bring BLE over EOB)   Sit-Supine [place] (Bed Mobility) moderate assist (50% patient effort)   Comment, (Bed Mobility) increased assist required due to BLE pain, HOB slightly elevated, use of bedrails   Transfers   Comment, (Transfers) STS from EOB min/modAx2, FWW placed in front of pt   Gait/Stairs (Locomotion)   Comment, (Gait/Stairs) Not assessed, minAx2 for pregait   Balance   Balance Comments ind static sitting EOB, minAx2 for standing balance at FWW   Coordination   Coordination no deficits were identified   Muscle Tone   Muscle Tone no deficits were identified   Clinical Impression    Criteria for Skilled Therapeutic Intervention Yes, treatment indicated   PT Diagnosis (PT) Impaired functional mobility   Influenced by the following impairments decreased activity tolerance, decreased strength, impaired balance   Functional limitations due to impairments bed mobility, transfers, gait, stairs   Clinical Presentation (PT Evaluation Complexity) evolving   Clinical Presentation Rationale Clinical judgement   Clinical Decision Making (Complexity) moderate complexity   Planned Therapy Interventions (PT) balance training;bed mobility training;gait training;home exercise program;patient/family education;ROM (range of motion);stair training;strengthening;transfer training;progressive activity/exercise;risk factor education;home program guidelines   Risk & Benefits of therapy have been explained evaluation/treatment results reviewed;care plan/treatment goals reviewed;risks/benefits reviewed;current/potential barriers reviewed;participants voiced agreement with care plan;participants included;patient   Clinical Impression Comments Aranza is a 69 year old female who presents with the above impairments. She would beneift from skilled IP PT to safely progress ind with functional mobility and strength.   PT Total Evaluation Time   PT Eval, Moderate Complexity Minutes (82104) 7   Physical Therapy Goals   PT Frequency 5x/week   PT Predicted Duration/Target Date for Goal Attainment 03/06/25   PT Goals Bed Mobility;Transfers;Gait;Stairs   PT: Bed Mobility Modified independent;Supine to/from sit;Rolling   PT: Transfers Modified independent;Sit to/from stand;Assistive device   PT: Gait Modified independent;Assistive device;10 feet   PT: Stairs Modified independent;Minimal assist;Assistive device;7 stairs;Rail on left   PT Discharge Planning   PT Plan sit<>stands, gait with chair follow, LE strengthening HEP   PT Discharge Recommendation (DC Rec) Transitional Care Facility   PT Rationale for DC Rec Pt is mobilizing  below her baseline and is currently requiring assist for all mobility. At this time pt would benefit from TCU in order to progress strength and ind with functional mobility prior to d/c home.   PT Brief overview of current status Ax1-2 bed mobility, STS, pivot transfers with GB and FWW   PT Total Distance Amb During Session (feet) 2   Physical Therapy Time and Intention   Timed Code Treatment Minutes 23   Total Session Time (sum of timed and untimed services) 30

## 2025-02-07 NOTE — PROGRESS NOTES
VS: See flowsheet     O2: >90% on RA. No complaints of SOB or chest pain.   Output: Voiding adequate amounts w/o pain or difficulty   Last BM: 2/5/25   Activity: Staff assist x 1-2    Skin: Wounds to coccyx, blister to L shin   Pain: Severe, given prn Oxycodone   CMS:    Dressing: Intact to open areas   Diet: Regular, prefers soft foods   LDA: R port a cath   Equipment: Personal belongings   Plan: Continue POC   Additional Info: Lymphedema consult, renal ultrasound this shift, critical osmolality reported to MD. New dressings applied to bilateral legs.

## 2025-02-07 NOTE — PLAN OF CARE
Goal Outcome Evaluation:    Plan of Care Reviewed With: patient    Overall Patient Progress: no change    Outcome Evaluation:     Neuro/CMS: A&Ox4.   Resp/Cardiac: Denies SOB, chest pain.   GI/: LBM ~2/3, senna given. Suprapubic catheter.   Skin: BLE weeping/blisters. Coccyx wounds, wound care orders in place. Meplilex on sacrum, BLE.   Activity: Repositioned side to side q2h, but done more frequently due to patient discomfort.   Pain: Severe pain, oxycodone q3h.   LDA: Right port a cath.   Diet: Regular.   Other: Pt very emotional and tearful every couple hours.     0630: Low urine output ~65mL overnight. Irrigated x1 this AM. Charge noted patient may be bypassing some urine due to sheets getting wet. Writer originally thought it was just patient's weeping wounds, but could possibly be urine as well. Notified Dr. Negrete, no response yet.     Plan: Continue with POC.

## 2025-02-07 NOTE — PROGRESS NOTES
02/07/25 1330   Appointment Info   Signing Clinician's Name / Credentials (OT) Aida Murray, OTR/L   Rehab Comments (OT) edema & OT eval   Quick Adds   Quick Adds Edema   Living Environment   People in Home grandchild(vernell);child(vernell), adult   Current Living Arrangements other (see comments)  (townhouse)   Home Accessibility stairs to enter home   Number of Stairs, Main Entrance 7   Stair Railings, Main Entrance railing on left side (ascending);railings safe and in good condition   Living Environment Comments Pt reports total of ~7 stairs with railing to enter home and is interested in getting a ramp. Pt lives with her children, grandchildren and niece in a townhouse. Needs met on main level with tub shower. Had multiple recent hospital admissions and 1 TCU stay.   Self-Care   Usual Activity Tolerance poor   Current Activity Tolerance poor   Equipment Currently Used at Home wheelchair, manual;walker, rolling;shower chair  (FWW, 4WW)   Activity/Exercise/Self-Care Comment Pt has been receiving assist with all I/ADLs and mobility since last hospital admission. Uses a FWW at baseline to ambulate short distances.   General Information   Onset of Illness/Injury or Date of Surgery 02/05/25   Referring Physician Dr. Brantley   Patient/Family Therapy Goal Statement (OT) none stated   Additional Occupational Profile Info/Pertinent History of Current Problem per chart,  69 year old woman admitted on 2/5/2025. 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   Existing Precautions/Restrictions fall   Left Upper Extremity (Weight-bearing Status) full weight-bearing (FWB)   Right  Upper Extremity (Weight-bearing Status) full weight-bearing (FWB)   Left Lower Extremity (Weight-bearing Status) full weight-bearing (FWB)   Right Lower Extremity (Weight-bearing Status) full weight-bearing (FWB)   Cognitive Status Examination   Orientation Status orientation to person, place and time   Affect/Mental Status (Cognitive) WFL   Follows Commands WFL   Sensory   Sensory Quick Adds sensation intact   Pain Assessment   Patient Currently in Pain Yes, see Vital Sign flowsheet   Edema General Information   Onset of Edema   (chronic)   Affected Body Part(s) Left LE;Right LE   Edema Etiology Unknown   Edema Examination/Assessment   Skin Condition Pitting;Non-pitting;Dryness   Skin Condition Comments 2 wounds covered with bandages on R posterior medial and lateral calf, 1 weeping blister on L anterior shin and 1 wound on medial thigh just above the knee crease both covered with bandages. BLE  edema with skin dry with soft edema proximal to the knee, firm non-pitting knee to ankel and firm 2+ pitting at ankle and dorsal foot.   Posture   Posture forward head position   Range of Motion Comprehensive   General Range of Motion no range of motion deficits identified   Strength Comprehensive (MMT)   Comment, General Manual Muscle Testing (MMT) Assessment generalized weakness   Muscle Tone Assessment   Muscle Tone Quick Adds No deficits were identified   Coordination   Upper Extremity Coordination No deficits were identified   Bed Mobility   Bed Mobility supine-sit;sit-supine   Supine-Sit [place] (Bed Mobility) maximum assist (25% patient effort)   Sit-Supine [place] (Bed Mobility) maximum assist (25% patient effort);2 person assist   Transfers   Transfers sit-stand transfer   Sit-Stand Transfer   Sit-Stand [place] (Transfers) moderate assist (50% patient effort);2 person assist   Balance   Balance Assessment no deficits identified   Activities of Daily Living   BADL Assessment/Intervention lower body  dressing   Lower Body Dressing Assessment/Training   [place] Level (Lower Body Dressing) dependent (less than 25% patient effort)   Clinical Impression   Criteria for Skilled Therapeutic Interventions Met (OT) Yes, treatment indicated   OT Diagnosis decreased ADL ind   Edema: Patient Presentation Edema   Influenced by the following impairments BLE edema and generalized weakness   OT Problem List-Impairments impacting ADL problems related to;activity tolerance impaired;pain;strength   Assessment of Occupational Performance 3-5 Performance Deficits   Identified Performance Deficits dressing, toileting, showering   Planned Therapy Interventions (OT) ADL retraining   Edema: Planned Interventions Gradient compression bandaging;Edema exercises;Education   Clinical Decision Making Complexity (OT) detailed assessment/moderate complexity   Risk & Benefits of therapy have been explained evaluation/treatment results reviewed;patient   OT Total Evaluation Time   OT Eval, Moderate Complexity Minutes (99169) 10   OT Goals   OT Predicted Duration/Target Date for Goal Attainment 02/14/25   OT: Hygiene/Grooming modified independent   OT: Upper Body Dressing Modified independent   OT: Lower Body Dressing Modified independent   OT: Bed Mobility Modified independent   OT: Toilet Transfer/Toileting Modified independent   OT: Edema education to increase ability to manage edema after discharge from the hospital Patient;Verbalize;Demonstrate;wear schedule;garrnet/bandage care;Supervision/Stand-by assist   OT: Management of edema bandages Patient;Verbalize;Demonstrate;Supervision/Stand-by assist   Self-Care/Home Management   Self-Care/Home Mgmt/ADL, Compensatory, Meal Prep Minutes (46728) 30   Treatment Detail/Skilled Intervention Pt supine in bed upon OT arrival and agreeable to therapy although hesitant. Pt reports fear and pain with movement. Active/therapeutic listening provided to build rapport. Educ on benfits of participating in  "therapy. Pt total A for LB dressing to don socks. Max A for supine to sit bed mob and Max Ax2 for sit to supine bed mob. Total Ax2 boosting to HOB with draw sheet. Increased time and effort needed to verbalize plan and sequencing for pt to anticipate what is next to promote IND. Pt Min-Mod Ax2 for STS from EOB CGA side stepping to HOB. Pt left supine in bed with needs in reach. Edema handouts given.   Symptoms Noted During/After Treatment (Meal Preparation/Planning Training) fatigue;increased pain   Manual Therapy   Manual Therapy: Mobilization, MFR, MLD, friction massage minutes (58580) 45   Symptoms noted during/after treatment increased pain;fatigue   Treatment Detail/Skilled Intervention Pt educated on lymphatic system anatomy/physiology, precautions, and treatment options. See evaluation above for description of BLE edema and skin. Pt is appropriate for use of GCBs to promote fluid mobilization and edema management, for improved skin integrity, functional mobility, and ADL independence. Pt's LE's washed and lotioned with Sween 24. Donned Size M TGSoft base layer, followed by 1 x 8cm short stretch bandage from MTP's to distal shin and 1 x 10 cm short stretch bandage from ankle to knee crease using \"quick wrap technique\" with 50% overlap and 50% stretch. Stretch mesh added over tape for increased hold. Pt reporting comfort. Pt educated on wear 24-48 hr wear schedule and indications for removal (pain, numbness, tingling, soiled, or loose/falling off). Pt educated in conservative strategies for managing BLE edema, including elevation and muscle pump activation. Pt verbalized understanding of all education. Patient care order entered.   OT Discharge Planning   OT Plan G1 check, BLE exercise, ADLs and transfers as able   OT Discharge Recommendation (DC Rec) Transitional Care Facility   OT Rationale for DC Rec Pt is below ADL baseline at this time and would benefit from continued skilled OT and TCU to increase ADL " ind, safety and act tolerance. Pt would benefit from OP lymph for edema management once dc home.   OT Brief overview of current status Ax2, new GCB to BLE   OT Total Distance Amb During Session (feet) 3   Total Session Time   Timed Code Treatment Minutes 75   Total Session Time (sum of timed and untimed services) 85

## 2025-02-07 NOTE — CONSULTS
Nephrology Initial Consult  February 7, 2025      Alis Hartman MRN:[id number] YOB: 1955  Date of Admission:2/5/2025  Primary care provider: Maria Fernanda Eckert  Requesting physician: Tashia Branltey MD    ASSESSMENT AND RECOMMENDATIONS:     Hyponatremia - Na 126, from 128 upon admission,    - Na slowly declining in setting of severe malnutrition, acute pain assoc with worsening coccyx ulcers, cancer.    - [place] 33/ Uosm 333 ( 2/7)   - Patient feels she drinks a lot but intake does not support that claim   - Na has been declining since 1/14/25. Was normal prior to that time.    - Repeat Georgia U Potassium and Uosm tomorrow   - Fluid restrict to 1200 ml/day   - Strict I/O   - Increase protein intake. Serum albumin is 1.9. Glucerna has high protein content.    - Lymphedema wraps for her LE edema    2. MADHU - Creat 1.4 from 1.3 upon admission. Creat was 1.0 on 1/27/25   - Has SP cath and has had complications including UTI and hydroureteronephrosis as recent as 11/24. Creat was as high as 2.0 at that time.    - Baseline creat 0.8   - Awaiting results of renal US   - UO documented as 300 ml today   - Cystatin C GFR is predictably lower than her creat based GFR.    - B/ps / and not on antihypertensives   - UA dirty but unsure how it was collected. She is afebrile. WBC 9.4   - Continue to monitor renal function with daily chemistry labs/strict I/O     3. Electrolytes/Acid base - No acute concerns. K 4.5 Bicarb 23       Recommendations were communicated to primary team via progress note    Discussed with Dr. 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   Nephrology consulted for hyponatremia and MADHU    Patient was admitted with Na 128 ( 126 today). Na has been < 134 since 1/14/25  Creat was 1.3 upon admission ( 1.4 today). Creat was 1.0 on 1/27/25  FENA 0.3 ( 2/6)   Fabi 33 UOsm 333 ( 2/7)  B/ps soft in the /  Not on any psych meds  Notes chronic pain  No nausea  Has incompletely treated endometrial cancer. Unable to complete chemo regimen due to recurrent admissions for sepsis. Chemo discontinued 10/15/24  Intake 250/ Output 300 ml  Given albumin by Primary team today       PAST MEDICAL HISTORY:  Reviewed with patient/EPIC on 02/07/2025     Past Medical History:   Diagnosis Date    Atrial fibrillation (H)     Depression     Hypertension     Malignant neoplasm of endocervix (H)     Tx with radiation    Near syncope 11/7/2024    Orthopnea 9/21/2024    Other chronic pain     Low back    Schizophrenia (H)     Urinary incontinence        Past Surgical History:   Procedure Laterality Date    ABDOMINOPLASTY      BIOPSY CERVICAL, LOCAL EXCISION, SINGLE/MULTIPLE  10/26/2011    Procedure:BIOPSY CERVICAL, LOCAL EXCISION, SINGLE/MULTIPLE; EUA, cervical biopsies; Surgeon:BETTY TINEO; Location:[facility] OR    BIOPSY VAGINAL N/A 06/08/2021    Procedure: BIOPSY, VAGINA;  Surgeon: Dany Perez MD;  Location: [facility]    CYSTOSCOPY N/A 05/17/2024    Procedure: Cystoscopy;  Surgeon: Dany Perez MD;  [facility]    CYSTOSTOMY, INSERT TUBE SUPRAPUBIC, COMBINED  07/12/2024    Procedure: Insertion of supra-pubic catheter;  Surgeon: Dany Perez MD;  [facility]    DILATION AND CURETTAGE, WITH ULTRASOUND GUIDANCE N/A 05/17/2024    Procedure: Dilation and  curettage of the uterus with drainage of uterine fluid under ultrasound guidance, lysis of vaginal adhesions;  Surgeon: Dany Perez MD;  [facility]    EXAM UNDER ANESTHESIA PELVIC N/A 03/12/2020    Procedure: Exam under anesthsia, vaginal biopsies, possible CO2 laser of the vagina;  Surgeon: Lilliam Roy MD;  [facility]    GI SURGERY  2004    gastric bypass    HYSTERECTOMY TOTAL ABDOMINAL, BILATERAL SALPINGO-OOPHORECTOMY, COMBINED Bilateral 07/12/2024    Procedure: Diagnostic laparoscopy converted to exploratory laparotomy, total abdominal hysterectomy, bilateral salpingo-oophorectomy, lysis of adhesions >60 min;  Surgeon: Dany Perez MD;  [facility]    INSERT PORT VASCULAR ACCESS N/A 08/26/2024    Procedure: Insert port vascular access;  Surgeon: Avery Gregory MD;  Location: [facility]    IR CHEST PORT PLACEMENT > 5 YRS OF AGE  08/26/2024    IR NEPHROSTOMY TUBE PLACEMENT LEFT  11/29/2024    LASER CO2 VAGINA N/A 03/02/2015    Procedure: LASER CO2 VAGINA;  Surgeon: Mariela Abdalla MD;  Location: [facility]    LASER CO2 VAGINA N/A 05/24/2019    Procedure: Exam under anesthesia, vaginal biopsies, CO2 laser of the vagina;  Surgeon: Lilliam Roy MD;  Location: [facility]    LASER CO2 VAGINA N/A 06/08/2021    Procedure: Exam under anesthesia, laser ablation of the upper vagina;  Surgeon: Dany Perez MD;  Location: [facility]    PICC DOUBLE LUMEN PLACEMENT Left 11/28/2024    left basilic 5 fr dl power picc 44 cm    REPAIR BLADDER N/A 07/12/2024    Procedure: Repair bladder;  Surgeon: Dany Perez MD;  [facility]        MEDICATIONS:  PTA Meds  Prior to Admission medications    Medication Sig Last Dose Taking? Auth Provider Long Term End Date   acetaminophen (TYLENOL) 325 MG tablet Take 2 tablets (650 mg) by mouth every 6 hours as needed for mild pain 2/5/2025 Morning Yes Dany Perez MD No    albuterol (PROAIR HFA/PROVENTIL HFA/VENTOLIN HFA) 108 (90 Base) MCG/ACT  inhaler Inhale 1-2 puffs into the lungs every 4 hours as needed for shortness of breath Unknown Yes Reported, Patient     apixaban ANTICOAGULANT (ELIQUIS) 5 MG tablet Take 1 tablet (5 mg) by mouth 2 times daily. 2/5/2025 Morning Yes Mar Chua MD No    calcium Citrate-vitamin D 500-400 MG-UNIT CHEW Take 1 tablet by mouth daily 2/5/2025 Morning Yes Reported, Patient     cyanocobalamin (CYANOCOBALAMIN) 1000 MCG/ML injection Inject 1 mL (1,000 mcg) into a muscle every month.  Yes Reported, Patient     diclofenac (VOLTAREN) 1 % topical gel Apply 4 g topically 4 times daily as needed for moderate pain. Unknown Yes Mar Chua MD     ferrous sulfate (CASSANDRA-IN-SOL) 75 (15 FE) MG/ML oral drops Take 15 mg by mouth daily 2/5/2025 Morning Yes Reported, Patient     hydrocortisone 2.5 % cream Apply topically as needed Unknown Yes Reported, Patient     lidocaine (XYLOCAINE) 5 % external ointment Apply 1 Application topically as needed 2/4/2025 Yes Reported, Patient     melatonin 3 MG tablet Take 1 tablet (3 mg) by mouth or Feeding Tube at bedtime. 2/4/2025 Bedtime Yes Mar Chua MD     naloxone (NARCAN) 4 MG/0.1ML nasal spray Spray 1 spray (4 mg) into one nostril alternating nostrils as needed for opioid reversal every 2-3 minutes until assistance arrives  Yes Gaurang Guajardo MD Yes    ondansetron (ZOFRAN) 8 MG tablet Take 1 tablet (8 mg) by mouth every 8 hours as needed for nausea Unknown Yes Kelsey Mccracken APRN CNP     oxyCODONE IR (ROXICODONE) 15 MG tablet Take 15 mg by mouth every 4 hours as needed (Chronic Pain). 2/4/2025 Evening Yes Reported, Patient No    phenol (CHLORASEPTIC) 1.4 % spray Take 1-2 sprays (1-2 mLs) by mouth every hour as needed for sore throat. Unknown Yes Dany Perez MD     polyethylene glycol (MIRALAX) 17 GM/Dose powder Take 17 g by mouth daily as needed for constipation 2/1/2025 Yes Dany Perez MD     prochlorperazine (COMPAZINE) 5 MG tablet Take 1-2 tablets (5-10 mg) by mouth  "every 6 hours as needed for nausea or vomiting Unknown Yes Kelsey Mccracken APRN CNP     senna-docusate (SENOKOT-S/PERICOLACE) 8.6-50 MG tablet Take 1 tablet by mouth 2 times daily Unknown Yes Dany Perez MD     Skin Protectants, Misc. (INTERDRY 10\"X36\") SHEE Externally apply 10 each topically every 24 hours. Apply to skin folds on abdomen 2/4/2025 Yes Dany Perez MD     triamcinolone (KENALOG) 0.1 % external ointment Apply topically 3 times daily as needed for irritation. Unknown Yes Dany Perez MD        Current Meds  Current Facility-Administered Medications   Medication Dose Route Frequency Provider Last Rate Last Admin    acetaminophen (TYLENOL) tablet 650 mg  650 mg Oral 4x Daily Tashia Brantley MD   650 mg at 02/07/25 1246    albumin human 25 % injection 50 g  50 g Intravenous Once Tashia Brantley MD   50 g at 02/07/25 1259    apixaban ANTICOAGULANT (ELIQUIS) tablet 5 mg  5 mg Oral BID Jose Nunez APRN CNP   5 mg at 02/07/25 0831    ferrous sulfate (CASSANDRA-IN-SOL) oral drops 15 mg  15 mg Oral Daily Tashia Brantley MD   15 mg at 02/07/25 0831    multivitamin w/minerals (THERA-VIT-M) tablet 1 tablet  1 tablet Oral Daily Tashia Brantley MD   1 tablet at 02/07/25 1330    senna-docusate (SENOKOT-S/PERICOLACE) 8.6-50 MG per tablet 1 tablet  1 tablet Oral BID Tashia Brantley MD   1 tablet at 02/07/25 0831    sodium chloride (PF) 0.9% PF flush 3 mL  3 mL Intracatheter Q8H Jose Nunez APRN CNP   3 mL at 02/07/25 1300    sodium chloride (PF) 0.9% PF flush 3 mL  3 mL Intracatheter Q8H Jose Nunze APRN CNP   3 mL at 02/07/25 1118     Infusion Meds  Current Facility-Administered Medications   Medication Dose Route Frequency Provider Last Rate Last Admin    Patient is already receiving anticoagulation with heparin, enoxaparin (LOVENOX), warfarin (COUMADIN)  or other anticoagulant medication   Does not apply Continuous PRN Jose Nunez APRN CNP           ALLERGIES: "    Allergies   Allergen Reactions    Ibuprofen Nausea and Vomiting    Shrimp     Sulfa Antibiotics Rash     Patient started on bactrim 7/24/24 tolerating medication without rash on 7/25        REVIEW OF SYSTEMS:  A comprehensive of systems was negative except as noted above.    SOCIAL HISTORY:   Social History     Socioeconomic History    Marital status: Single     Spouse name: Not on file    Number of children: Not on file    Years of education: Not on file    Highest education level: Not on file   Occupational History    Not on file   Tobacco Use    Smoking status: Never    Smokeless tobacco: Never   Substance and Sexual Activity    Alcohol use: Not Currently    Drug use: No    Sexual activity: Not on file   Other Topics Concern    Parent/sibling w/ CABG, MI or angioplasty before 65F 55M? Not Asked   Social History Narrative    Not on file     Social Drivers of Health     Financial Resource Strain: Low Risk  (2/5/2025)    Financial Resource Strain     Within the past 12 months, have you or your family members you live with been unable to get utilities (heat, electricity) when it was really needed?: No   Food Insecurity: Low Risk  (2/5/2025)    Food Insecurity     Within the past 12 months, did you worry that your food would run out before you got money to buy more?: No     Within the past 12 months, did the food you bought just not last and you didn t have money to get more?: No   Transportation Needs: Low Risk  (2/5/2025)    Transportation Needs     Within the past 12 months, has lack of transportation kept you from medical appointments, getting your medicines, non-medical meetings or appointments, work, or from getting things that you need?: No   Physical Activity: Not on file   Stress: Not on file   Social Connections: Not on file   Interpersonal Safety: Low Risk  (2/5/2025)    Interpersonal Safety     Do you feel physically and emotionally safe where you currently live?: Yes     Within the past 12 months, have  "you been hit, slapped, kicked or otherwise physically hurt by someone?: No     Within the past 12 months, have you been humiliated or emotionally abused in other ways by your partner or ex-partner?: No   Housing Stability: Low Risk  (2025)    Housing Stability     Do you have housing? : Yes     Are you worried about losing your housing?: No       FAMILY MEDICAL HISTORY:   Family History   Problem Relation Age of Onset    Heart Disease Father     Heart Failure Father     No Known Problems Brother     No Known Problems Brother     No Known Problems Brother     Pancreatitis Brother     Hypertension Sister     Peripheral Vascular Disease Sister     No Known Problems Sister     No Known Problems Son     No Known Problems Daughter      PHYSICAL EXAM:   Temp  Av.6  F (36.4  C)  Min: 97.3  F (36.3  C)  Max: 98.3  F (36.8  C)      Pulse  Av.9  Min: 75  Max: 100 Resp  Av.4  Min: 16  Max: 18  SpO2  Av.5 %  Min: 96 %  Max: 100 %       BP 95/68 (Patient Position: Left side, Cuff Size: Adult Regular)   Pulse 81   Temp 97.4  F (36.3  C) (Oral)   Resp 16   Ht 1.6 m (5' 3\")   Wt 62 kg (136 lb 11 oz)   SpO2 100%   BMI 24.21 kg/m        Admit Weight: 62 kg (136 lb 11 oz)     GENERAL APPEARANCE: Frail female in NAD  EYES: no scleral icterus, pupils equal  Pulmonary: lungs CTA. Breathing is non labored.   CV: regular rhythm, normal rate   - Edema 3+ and weeping  GI: soft, nontender  : SP cath  NEURO: alert/interactive/oriented    LABS:   I have reviewed the following labs:  CMP  Recent Labs   Lab 25  0612 25  1911 25  0722 25  1452   * 128* 129* 128*   POTASSIUM 4.5  --  5.3 4.9   CHLORIDE 96*  --  96* 98   CO2 23  --  20* 21*   ANIONGAP 7  --  13 9   GLC 90  --  74 82   BUN 30.5*  --  28.5* 29.3*   CR 1.44* 1.40* 1.33* 1.36*   GFRESTIMATED 39*  --  43* 41*   SAMUEL 8.0*  --  8.1* 8.0*   PHOS 4.0  --  4.2  --    PROTTOTAL  --   --   --  5.0*   ALBUMIN  --   --   --  1.9* "   BILITOTAL  --   --   --  0.5   ALKPHOS  --   --   --  147   AST  --   --   --  20   ALT  --   --   --  12     CBC  Recent Labs   Lab 02/07/25  0612 02/05/25  1452   HGB 9.4* 10.0*   WBC 9.4 9.9   RBC 3.19* 3.40*   HCT 27.4* 29.3*   MCV 86 86   MCH 29.5 29.4   MCHC 34.3 34.1   RDW 17.6* 18.3*    269     INR  Recent Labs   Lab 02/05/25  1452   INR 1.24*     ABGNo lab results found in last 7 days.   URINE STUDIES  Recent Labs   Lab Test 02/07/25  1409 02/05/25  1540 01/25/25  1512 11/29/24  2202 07/21/24  1705 04/11/24  1152 05/23/23  0820 02/15/23  0927   COLOR Orange* Orange* Dark Brown* Dark Brown*   < > Yellow   < > Yellow   APPEARANCE Cloudy* Cloudy* Cloudy* Cloudy*   < > Clear   < > Clear   URINEGLC Negative Negative 30* 30*   < > Negative   < > Negative   URINEBILI Negative Negative Negative Negative   < > Negative   < > Negative   URINEKETONE Negative Negative Negative Negative   < > Negative   < > Negative   SG 1.018 1.020 1.015 1.029   < > 1.010   < > 1.015   UBLD Large* Large* Large* Large*   < > Moderate*   < > Large*   URINEPH 5.5 5.5 7.0 6.0   < > 7.0   < > 7.0   PROTEIN 200* 100* 300* 200*   < > Trace*   < > Negative   UROBILINOGEN  --   --   --   --   --  1.0  --  1.0   NITRITE Negative Negative Negative Negative   < > Positive*   < > Positive*   LEUKEST Large* Large* Large* Small*   < > Large*   < > Small*   RBCU >182* >182* >182* >182*   < > 10-25*   < > 25-50*   WBCU >182* >182* >182* >182*   < > >100*   < > 10-25*    < > = values in this interval not displayed.     No lab results found.  PTH  No lab results found.  IRON STUDIES  Recent Labs   Lab Test 09/24/24  1330   IRON 22*   *   IRONSAT 22   CASSANDRA 450*       IMAGING:  Renal US pending    Marianna Bella, NP

## 2025-02-08 ENCOUNTER — APPOINTMENT (OUTPATIENT)
Dept: OCCUPATIONAL THERAPY | Facility: CLINIC | Age: 70
End: 2025-02-08
Payer: MEDICARE

## 2025-02-08 LAB
ALBUMIN SERPL BCG-MCNC: 2.6 G/DL (ref 3.5–5.2)
ALP SERPL-CCNC: 110 U/L (ref 40–150)
ALT SERPL W P-5'-P-CCNC: 15 U/L (ref 0–50)
ANION GAP SERPL CALCULATED.3IONS-SCNC: 10 MMOL/L (ref 7–15)
AST SERPL W P-5'-P-CCNC: 28 U/L (ref 0–45)
BILIRUB DIRECT SERPL-MCNC: <0.2 MG/DL (ref 0–0.3)
BILIRUB SERPL-MCNC: 0.5 MG/DL
BUN SERPL-MCNC: 28.8 MG/DL (ref 8–23)
CALCIUM SERPL-MCNC: 8.2 MG/DL (ref 8.8–10.4)
CHLORIDE SERPL-SCNC: 99 MMOL/L (ref 98–107)
CREAT SERPL-MCNC: 1.3 MG/DL (ref 0.51–0.95)
EGFRCR SERPLBLD CKD-EPI 2021: 44 ML/MIN/1.73M2
GLUCOSE SERPL-MCNC: 72 MG/DL (ref 70–99)
HCO3 SERPL-SCNC: 22 MMOL/L (ref 22–29)
OSMOLALITY SERPL: 278 MMOL/KG (ref 280–301)
OSMOLALITY UR: 370 MMOL/KG (ref 100–1200)
POTASSIUM SERPL-SCNC: 4.5 MMOL/L (ref 3.4–5.3)
PROT SERPL-MCNC: 4.7 G/DL (ref 6.4–8.3)
SODIUM SERPL-SCNC: 131 MMOL/L (ref 135–145)
SODIUM UR-SCNC: <20 MMOL/L

## 2025-02-08 PROCEDURE — 250N000013 HC RX MED GY IP 250 OP 250 PS 637: Performed by: NURSE PRACTITIONER

## 2025-02-08 PROCEDURE — 83930 ASSAY OF BLOOD OSMOLALITY: CPT | Performed by: INTERNAL MEDICINE

## 2025-02-08 PROCEDURE — P9047 ALBUMIN (HUMAN), 25%, 50ML: HCPCS | Mod: JZ | Performed by: INTERNAL MEDICINE

## 2025-02-08 PROCEDURE — 250N000013 HC RX MED GY IP 250 OP 250 PS 637: Performed by: INTERNAL MEDICINE

## 2025-02-08 PROCEDURE — 82248 BILIRUBIN DIRECT: CPT | Performed by: INTERNAL MEDICINE

## 2025-02-08 PROCEDURE — 36415 COLL VENOUS BLD VENIPUNCTURE: CPT | Performed by: INTERNAL MEDICINE

## 2025-02-08 PROCEDURE — 80076 HEPATIC FUNCTION PANEL: CPT | Performed by: INTERNAL MEDICINE

## 2025-02-08 PROCEDURE — 83935 ASSAY OF URINE OSMOLALITY: CPT

## 2025-02-08 PROCEDURE — 84300 ASSAY OF URINE SODIUM: CPT

## 2025-02-08 PROCEDURE — 99233 SBSQ HOSP IP/OBS HIGH 50: CPT | Performed by: STUDENT IN AN ORGANIZED HEALTH CARE EDUCATION/TRAINING PROGRAM

## 2025-02-08 PROCEDURE — 97140 MANUAL THERAPY 1/> REGIONS: CPT | Mod: GO

## 2025-02-08 PROCEDURE — 250N000011 HC RX IP 250 OP 636: Mod: JZ | Performed by: INTERNAL MEDICINE

## 2025-02-08 PROCEDURE — 120N000002 HC R&B MED SURG/OB UMMC

## 2025-02-08 PROCEDURE — 99233 SBSQ HOSP IP/OBS HIGH 50: CPT | Performed by: INTERNAL MEDICINE

## 2025-02-08 PROCEDURE — 82310 ASSAY OF CALCIUM: CPT | Performed by: INTERNAL MEDICINE

## 2025-02-08 PROCEDURE — 250N000011 HC RX IP 250 OP 636: Mod: JW | Performed by: PHYSICIAN ASSISTANT

## 2025-02-08 RX ORDER — HEPARIN SODIUM,PORCINE 10 UNIT/ML
5-10 VIAL (ML) INTRAVENOUS EVERY 24 HOURS
Status: DISPENSED | OUTPATIENT
Start: 2025-02-08

## 2025-02-08 RX ORDER — HEPARIN SODIUM,PORCINE 10 UNIT/ML
5-10 VIAL (ML) INTRAVENOUS
Status: DISPENSED | OUTPATIENT
Start: 2025-02-08

## 2025-02-08 RX ORDER — HYDROMORPHONE HYDROCHLORIDE 1 MG/ML
0.3 INJECTION, SOLUTION INTRAMUSCULAR; INTRAVENOUS; SUBCUTANEOUS ONCE
Status: COMPLETED | OUTPATIENT
Start: 2025-02-08 | End: 2025-02-08

## 2025-02-08 RX ORDER — HYDROMORPHONE HYDROCHLORIDE 2 MG/1
2 TABLET ORAL
Status: DISPENSED | OUTPATIENT
Start: 2025-02-08

## 2025-02-08 RX ORDER — ALBUMIN (HUMAN) 12.5 G/50ML
50 SOLUTION INTRAVENOUS ONCE
Status: COMPLETED | OUTPATIENT
Start: 2025-02-08 | End: 2025-02-08

## 2025-02-08 RX ORDER — HEPARIN SODIUM (PORCINE) LOCK FLUSH IV SOLN 100 UNIT/ML 100 UNIT/ML
5-10 SOLUTION INTRAVENOUS
Status: DISPENSED | OUTPATIENT
Start: 2025-02-08

## 2025-02-08 RX ADMIN — Medication 5 ML: at 12:53

## 2025-02-08 RX ADMIN — HYDROMORPHONE HYDROCHLORIDE 0.3 MG: 1 INJECTION, SOLUTION INTRAMUSCULAR; INTRAVENOUS; SUBCUTANEOUS at 03:58

## 2025-02-08 RX ADMIN — SENNOSIDES AND DOCUSATE SODIUM 1 TABLET: 50; 8.6 TABLET ORAL at 07:53

## 2025-02-08 RX ADMIN — ACETAMINOPHEN 650 MG: 325 TABLET, FILM COATED ORAL at 12:54

## 2025-02-08 RX ADMIN — Medication 1 TABLET: at 07:53

## 2025-02-08 RX ADMIN — ALBUMIN (HUMAN) 50 G: 0.25 INJECTION, SOLUTION INTRAVENOUS at 12:54

## 2025-02-08 RX ADMIN — HYDROMORPHONE HYDROCHLORIDE 0.3 MG: 1 INJECTION, SOLUTION INTRAMUSCULAR; INTRAVENOUS; SUBCUTANEOUS at 21:36

## 2025-02-08 RX ADMIN — APIXABAN 5 MG: 5 TABLET, FILM COATED ORAL at 20:15

## 2025-02-08 RX ADMIN — ACETAMINOPHEN 650 MG: 325 TABLET, FILM COATED ORAL at 07:53

## 2025-02-08 RX ADMIN — APIXABAN 5 MG: 5 TABLET, FILM COATED ORAL at 07:53

## 2025-02-08 RX ADMIN — HYDROMORPHONE HYDROCHLORIDE 2 MG: 2 TABLET ORAL at 20:14

## 2025-02-08 RX ADMIN — HYDROMORPHONE HYDROCHLORIDE 0.3 MG: 1 INJECTION, SOLUTION INTRAMUSCULAR; INTRAVENOUS; SUBCUTANEOUS at 16:49

## 2025-02-08 RX ADMIN — HYDROMORPHONE HYDROCHLORIDE 2 MG: 2 TABLET ORAL at 14:51

## 2025-02-08 RX ADMIN — ACETAMINOPHEN 650 MG: 325 TABLET, FILM COATED ORAL at 16:48

## 2025-02-08 RX ADMIN — Medication 15 MG: at 07:53

## 2025-02-08 RX ADMIN — HYDROMORPHONE HYDROCHLORIDE 2 MG: 2 TABLET ORAL at 12:54

## 2025-02-08 RX ADMIN — HYDROMORPHONE HYDROCHLORIDE 2 MG: 2 TABLET ORAL at 09:26

## 2025-02-08 RX ADMIN — HYDROMORPHONE HYDROCHLORIDE 2 MG: 2 TABLET ORAL at 18:17

## 2025-02-08 RX ADMIN — SENNOSIDES AND DOCUSATE SODIUM 1 TABLET: 50; 8.6 TABLET ORAL at 20:15

## 2025-02-08 RX ADMIN — ACETAMINOPHEN 650 MG: 325 TABLET, FILM COATED ORAL at 20:14

## 2025-02-08 ASSESSMENT — ACTIVITIES OF DAILY LIVING (ADL)
ADLS_ACUITY_SCORE: 64
ADLS_ACUITY_SCORE: 64
ADLS_ACUITY_SCORE: 62
ADLS_ACUITY_SCORE: 64
ADLS_ACUITY_SCORE: 64
ADLS_ACUITY_SCORE: 62
ADLS_ACUITY_SCORE: 64
ADLS_ACUITY_SCORE: 62
ADLS_ACUITY_SCORE: 64
ADLS_ACUITY_SCORE: 62
ADLS_ACUITY_SCORE: 62
ADLS_ACUITY_SCORE: 64
ADLS_ACUITY_SCORE: 62
ADLS_ACUITY_SCORE: 62
ADLS_ACUITY_SCORE: 64
ADLS_ACUITY_SCORE: 62
ADLS_ACUITY_SCORE: 64

## 2025-02-08 NOTE — PROGRESS NOTES
[facility]    Medicine Progress Note - Hospitalist Service, GOLD TEAM 22    Date of Admission:  2/5/2025    Assessment & Plan   Alis Hartman is a 69 year old woman admitted on 2/5/2025. 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     Today   - Cr improved, studies overall remain consistent with prerenal injury   - repeat albumin today   - schedule dilaudid, likely got behind on meds   - OT rewrapping legs     # MADHU on CKD3   # hyponatremia, likely hypovolemic    Cr worsened on admission compared to prior, slowly worsening since 1/25. Overall studies suggest prerenal but didn't improve much with small IVF+ encouraging PO intake but this has been limited. History of  hydronephrosis related to her endometrial cancer s/p PNT removal, L just in December 2024. Cystatin C expectedly lower, GFR estimated to be <30.   Hyponatremia has been worsening in the last few weeks in the setting of poor PO intake, now likely prerenal MADHU, poor nutrition. Renal US negative for hydronephrosis   - Cr stabilized/little improved, studies overall still suggest pre-renal/poor perfusion, will give IV albumin today to try and volume expand  - renal consulted, appreciate recs   - suspect serum osm erroneous, repeat     # Chronic bilateral lower extremity edema  # Deconditioning  # Sacral pressure ulcer, POA, worsening   # Severe malnutrition in the context of chronic illness   Hx of chronic edema, no PTA diuretics. Echo 11/24 reassuring EF but poor study, cant comment on diastolic function, edema likely due to some degree of proteinuria and  low oncotic pressure with low albumin/malnutrition.  Last hospitalization recommendation was to transitional care unit last hospitalization, however per patient preference and for her mental health discharged to home with home care and family support in place. Unfortunately symptoms continued to progress at home   - Followed now by lymphedema, WOC RN, PT,  RD   - social work consulted re:placement/discharge planning   - continue dilaudid PO, ok to adjust up if needed     #Hx of ESBL urosepsis and bacteremia  #Bladder dysfunction s/p cystostomy and suprapubic catheter  Recently admitted 11/26 - 12/8/2024 for ESBL urosepsis and bacteremia requiring ICU w/ L nephrostomy tube (removed 1/7) treated w/ ertapenem, returned 1/25-1/27 for concern for UTI but no clear infection at that time.  ID was consulted that admission and recommended avoiding frequent UA and Ucx checks in future unless patient w/ symptoms of UTI.   - UA/Ucx abnormal at admission, likely colonization, will continue to monitor off antibiotics   - may be overdue for suprapubic cath exchange     #Serous endometrial carcinoma  Follows w/ heme/onc through [place]. S/p SNEHA, BSO 07/2024 s/p cycle 3 carbo/taxel 10/15/2024, cycle 4 delayed in s/o recent admission, family ultimately decided to forgo any further treatment.      #Chronic anemia, stable  Likely multifactorial 2/2 malignancy, renal disease, nutrition   - monitor       Diet: Combination Diet Regular Diet Adult  Snacks/Supplements Adult: Other; Send chocolate Ensure Enlive w/ breakfast, strawberry Ensure+HP with dinner; With Meals  Fluid restriction 1200 ML FLUID    DVT Prophylaxis: DOAC  Shahid Catheter: Not present  Lines: PRESENT      Port a Cath 02/05/25 Single Lumen Right Chest wall-Site Assessment: WDL      Cardiac Monitoring: None  Code Status: Full Code      Clinically Significant Risk Factors         # Hyponatremia: Lowest Na = 126 mmol/L in last 2 days, will monitor as appropriate  #  Hypochloremia: Lowest Cl = 96 mmol/L in last 2 days, will monitor as appropriate      # Hypoalbuminemia: Lowest albumin = 1.9 g/dL at 2/5/2025  2:52 PM, will monitor as appropriate     # Hypertension: Noted on problem list             # Severe Malnutrition: based on nutrition assessment, PRESENT ON ADMISSION   # Financial/Environmental Concerns: unable to afford rent/mortgage (pt interested in resources/info on getting assistance paying rent, as well as applying for county benefits)         Social Drivers of Health            Disposition Plan     Medically Ready for Discharge: Anticipated in 2-4 Days             Tashia Brantley MD  Hospitalist Service, GOLD TEAM 22  [facility]  Securely message with [employer] (more info)  Text page via [employer] Paging/Directory   See signed in provider for up to date coverage information  ______________________________________________________________________    Interval History    No acute events overnight, nursing notes reviewed.  Aranza is in tears today, in pain, struggling with repositioning, describes persistent worsened pain in her coccyx, legs, new left heel pain. Oral pain about the same, tolerating a little PO intake. Breathing comfortable, no chest pain     5-pt ROS otherwise negative       Physical Exam   Vital Signs: Temp: 97.5  F (36.4  C) Temp src: Oral BP: (!) 81/63 Pulse: 83   Resp: 18 SpO2: 100 % O2 Device: None (Room air)    Weight: 136 lbs 10.96 oz    Gen: alert, interactive and pleasant, lying in bed and tearful, chronically ill appearing   HEENT: Normocephalic/atraumatic, sclera clear, edentulous, oropharynx with mildly dry mucous membranes, stable ulcers on upper and lower middle gums  Resp: Clear bilaterally anteriorly, easy work of breathing on room air, no wheezing, shallow breathing   CV: Regular rate and rhythm, no murmurs noted    Abd: soft, diffusely tender, nondistended. Suprapubic cath in place without bleeding  noted   Ext: 2+ lower extremity edema after lymphedema wraps removed today w/OT, large weeping blister on L shin decompressed   Neuro: Alert and oriented x4, grossly non-focal, generalized weakness but moving all extremities equally    Medical Decision Making       55 MINUTES SPENT BY ME on the date of service doing chart review, history, exam, documentation & further activities per the note.      Data     I have personally reviewed the following data over the past 24 hrs:    N/A  \   N/A   / N/A     131 (L) 99 28.8 (H) /  72   4.5 22 1.30 (H) \     ALT: 15 AST: 28 AP: 110 TBILI: 0.5   ALB: 2.6 (L) TOT PROTEIN: 4.7 (L) LIPASE: N/A

## 2025-02-08 NOTE — PROGRESS NOTES
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   Nephrology consulted for hyponatremia and MADHU. Both improved after IV albumin x1 2/7.     # MADHU, improving  # MADHU on CKD  # hyponatremia, improving, suspecting hypovolemic hyponatremia   Baseline creatinine 0.9 mg/dl. No obstruction on renal U/S for this patient with hx of urinary obstruction. Has SP cath and has had complications including UTI and hydroureteronephrosis as recent as 11/24.   Renal U/S suggesting underlying CKD, fitting with known lower cysC-eGFR compared to creat-eGFR.  Fabi is low on 2/8, which suggest hypovolemia (sodium avid kidneys).   Agree with continued IV albumin, and after that we will need to see how well the patient performs with her PO intake.   -can begin to liberalize fluid intake (I changed this order from 1200ml max fluids to 2L/day on 2/8)  -agree with lymphedema consult  -agree with plans for suprapubic cath management   -would be reasonable to refer to outpatient nephrology upon eventual hospital discharge.       Nephrology will sign off at this time.     Recommendations were communicated to primary team in-person.    Omari Rebollar MD   Division of Nephrology and Hypertension  Formerly [facility]  Vocera Web Console        Interval History :   Nursing and provider notes from last 24 hours reviewed.  In the last 24 hours Alis Hartman has decrease creatinine, increased serum sodium to 131, and urine sodium concentration  "of <20 after IV albumin yesterday. She states, when told that she may be dehydrated, that she \"thought so but didn't say anything about it to anyone.\" She also now tells us that her LE swelling is much improved even though it seemed she presented to the ER for worsened edema. Thus, unclear if the patient has been an accurate historian.     Review of Systems:   4 point ROS otherwise negative     Physical Exam:   I/O last 3 completed shifts:  In: 240 [P.O.:240]  Out: 650 [Urine:650]   BP 93/60 (BP Location: Right arm)   Pulse 70   Temp 97.3  F (36.3  C) (Oral)   Resp 18   Ht 1.6 m (5' 3\")   Wt 62 kg (136 lb 11 oz)   SpO2 100%   BMI 24.21 kg/m       GENERAL APPEARANCE: NAD  EYES:  no scleral icterus  PULM: CTAB  CV: regular rhythm, normal rate, no rub     -edema in LEs present, under compression wraps  GI: soft, NTND  INTEGUMENT: no rash on exposed skin  NEURO: conversant     Labs:   All labs reviewed by me  Electrolytes/Renal -   Recent Labs   Lab Test 02/08/25  0928 02/07/25  0612 02/06/25  1911 02/06/25  0722 01/27/25  1051 01/27/25  0318 01/26/25  0830 01/25/25  1254 12/12/24  2055 12/12/24  1944 12/08/24  0800 12/08/24  0347   * 126* 128* 129*   < > 131*   < > 134*   < > 141  --  140   POTASSIUM 4.5 4.5  --  5.3   < > 3.2*   < > 3.2*   < > 3.7  --  4.6   CHLORIDE 99 96*  --  96*   < > 100   < > 101   < > 114*  --  113*   CO2 22 23  --  20*   < > 23   < > 23   < > 21*  --  23   BUN 28.8* 30.5*  --  28.5*   < > 15.1   < > 11.3   < > 10.9  --  22.0   CR 1.30* 1.44* 1.40* 1.33*   < > 1.08*   < > 0.98*   < > 0.72  --  0.64   GLC 72 90  --  74   < > 77   < > 117*   < > 79   < > 103*   SAMUEL 8.2* 8.0*  --  8.1*   < > 8.1*   < > 7.7*   < > 8.0*  --  7.8*   MAG  --   --   --   --   --   --   --  1.7  --  1.7  --  2.2   PHOS  --  4.0  --  4.2  --  2.7   < > 2.4*  --   --   --  3.0    < > = values in this interval not displayed.       CBC -   Recent Labs   Lab Test 02/07/25  0612 02/05/25  1452 01/27/25  0318 "   WBC 9.4 9.9 6.4   HGB 9.4* 10.0* 8.7*    269 219       LFTs -   Recent Labs   Lab Test 02/08/25  0928 02/05/25  1452 01/25/25  1254   ALKPHOS 110 147 129   BILITOTAL 0.5 0.5 0.7   ALT 15 12 14   AST 28 20 19   PROTTOTAL 4.7* 5.0* 5.3*   ALBUMIN 2.6* 1.9* 2.3*       Iron Panel -   Recent Labs   Lab Test 09/24/24  1330   IRON 22*   IRONSAT 22   CASSANDRA 450*         Imaging:  All imaging studies reviewed by me.     Current Medications:  Current Facility-Administered Medications   Medication Dose Route Frequency Provider Last Rate Last Admin    acetaminophen (TYLENOL) tablet 650 mg  650 mg Oral 4x Daily Tashia Brantley MD   650 mg at 02/08/25 1254    albumin human 25 % injection 50 g  50 g Intravenous Once Tashia Brantley MD 50 mL/hr at 02/08/25 1254 50 g at 02/08/25 1254    apixaban ANTICOAGULANT (ELIQUIS) tablet 5 mg  5 mg Oral BID Jose Nunez APRN CNP   5 mg at 02/08/25 0753    ferrous sulfate (CASSANDRA-IN-SOL) oral drops 15 mg  15 mg Oral Daily Tashia Brantley MD   15 mg at 02/08/25 0753    heparin lock flush 10 unit/mL injection 5-10 mL  5-10 mL Intracatheter Q24H Tashia Brantley MD   5 mL at 02/08/25 1253    heparin lock flush 100 unit/mL injection 5-10 mL  5-10 mL Intracatheter Q28 Days Tashia Brantley MD        HYDROmorphone (DILAUDID) tablet 2 mg  2 mg Oral Q3H Tashia Brantley MD   2 mg at 02/08/25 1254    multivitamin w/minerals (THERA-VIT-M) tablet 1 tablet  1 tablet Oral Daily Tashia Brantley MD   1 tablet at 02/08/25 0753    senna-docusate (SENOKOT-S/PERICOLACE) 8.6-50 MG per tablet 1 tablet  1 tablet Oral BID Tashia Brantley MD   1 tablet at 02/08/25 0753    sodium chloride (PF) 0.9% PF flush 10-20 mL  10-20 mL Intracatheter Q28 Days Tashia Brantley MD        sodium chloride (PF) 0.9% PF flush 3 mL  3 mL Intracatheter Q8H Jose Nunez APRN CNP   3 mL at 02/07/25 1300    sodium chloride (PF) 0.9% PF flush 3 mL  3 mL Intracatheter Q8H Jose Nunez APRN CNP   3 mL at  02/08/25 1253     Current Facility-Administered Medications   Medication Dose Route Frequency Provider Last Rate Last Admin    Patient is already receiving anticoagulation with heparin, enoxaparin (LOVENOX), warfarin (COUMADIN)  or other anticoagulant medication   Does not apply Continuous JULION Jose Nunez APRN CNP Daniel Patrick Murphy, MD

## 2025-02-08 NOTE — PLAN OF CARE
"[id number]    Goal Outcome Evaluation:      Plan of Care Reviewed With: patient    Overall Patient Progress: no change    Patient alert and oriented, stable on room air. Not out of bed this shift, repositioning as tolerated. BLE wraps on. Pain managed with prn dilaudid and tylenol. BP soft this shift- provider aware, no new orders at this time. 0500 BP 81/63, pt reports no symptoms provider aware- no new orders. Pt frustrated with staff \"constantly\" checking BP and repositioning. Suprapubic catheter in place with adequate output, urine sample sent to lab this shift. Able to make needs known. Call light within reach. Continue with current plan of care.   "

## 2025-02-08 NOTE — PROGRESS NOTES
[facility]    Medicine Progress Note - Hospitalist Service, GOLD TEAM 22    Date of Admission:  2/5/2025    Assessment & Plan   Alis Hartman is a 69 year old woman admitted on 2/5/2025. 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     Today   - Cr continues to worsen, studies overall still suggest pre-renal/poor perfusion, will give IV albumin today ti try and volume expand, renal consult   - suspect serum osm erroneous, repeat   - renal US negative for hydronephrosis   - stop oxycodone in setting of renal dysfunction and poor pain control, start dilaudid PO, ok to adjust up if needed   - follow up WOC, lymphedema consults   - may be overdue for suprapubic cath exchange, urology consulted     # MADHU on CKD3   # hyponatremia, likely hypovolemic    Cr worsened on admission compared to prior, slowly worsening since 1/25. Overall studies suggest prerenal but didn't improve much with small IVF+ encouraging PO intake but this has been limited. History of  hydronephrosis related to her endometrial cancer s/p PNT removal, L just in December 2024. Cystatin C expectedly lower, GFR estimated to be <30.   Hyponatremia has been worsening in the last few weeks in the setting of poor PO intake, now likely prerenal MADHU, poor nutrition    - Cr continues to worsen, studies overall still suggest pre-renal/poor perfusion, will give IV albumin today to try and volume expand  - renal consult   - suspect serum osm erroneous, repeat   - renal US negative for hydronephrosis     # Chronic bilateral lower extremity edema  # Deconditioning  #  Sacral pressure ulcer, POA, worsening   Hx of chronic edema, no PTA diuretics. Echo 11/24 reassuring EF but poor study, cant comment on diastolic function, edema likely due to some degree of proteinuria and low oncotic pressure with low albumin/malnutrition.  Last hospitalization recommendation was to transitional care unit last hospitalization, however per patient preference and for her mental health discharged to home with home care and family support in place. Unfortunately symptoms continued to progress at home   - Consulted lymphedema  - Consulted WOC RN  - Consulted PT  - Consulted social work   - stop oxycodone in setting of renal dysfunction and poor pain control, start dilaudid PO, ok to adjust up if needed     #Hx of ESBL urosepsis and bacteremia  #Bladder dysfunction s/p cystostomy and suprapubic catheter  Recently admitted 11/26 - 12/8/2024 for ESBL urosepsis and bacteremia requiring ICU w/ L nephrostomy tube (removed 1/7) treated w/ ertapenem, returned 1/25-1/27 for concern for UTI but no clear infection at that time.  ID was consulted that admission and recommended avoiding frequent UA and Ucx checks in future unless patient w/ symptoms of UTI.   - UA obtained and abnormal at admission, will continue to monitor off antibiotics   - may be overdue for suprapubic cath exchange, urology consulted     #Serous endometrial carcinoma  Follows w/ heme/onc through [facility]. S/p NSEHA, BSO 07/2024 s/p cycle 3 carbo/taxel 10/15/2024, cycle 4 delayed in s/o recent admission, family ultimately decided to forgo any further treatment.      #Chronic anemia, stable  Likely multifactorial 2/2 malignancy, renal disease, nutrition   - monitor         Diet: Combination Diet Regular Diet Adult  Snacks/Supplements Adult: Other; Send chocolate Ensure Enlive w/ breakfast, strawberry Ensure+HP with dinner; With Meals  Fluid restriction 1200 ML FLUID    DVT Prophylaxis: DOAC  Shahid Catheter: Not present  Lines: PRESENT      Port a  Cath 02/05/25 Single Lumen Right Chest wall-Site Assessment: WDL      Cardiac Monitoring: None  Code Status: Full Code      Clinically Significant Risk Factors         # Hyponatremia: Lowest Na = 126 mmol/L in last 2 days, will monitor as appropriate  # Hypochloremia: Lowest Cl = 96 mmol/L in last 2 days, will monitor as appropriate      # Hypoalbuminemia: Lowest albumin = 1.9 g/dL at 2/5/2025  2:52 PM, will monitor as appropriate  # Coagulation Defect: INR = 1.24 (Ref range: 0.85 - 1.15) and/or PTT = 77 Seconds (Ref range: 22 - 38 Seconds), will monitor for bleeding    # Hypertension: Noted on problem list             # Severe Malnutrition: based on nutrition assessment, PRESENT ON ADMISSION   # Financial/Environmental Concerns: unable to afford rent/mortgage (pt interested in resources/info on getting assistance paying rent, as well as applying for county benefits)         Social Drivers of Health            Disposition Plan     Medically Ready for Discharge: Anticipated in 2-4 Days             Tashia Brantley MD  Hospitalist Service, GOLD TEAM 22  [facility]  Securely message with [facility] (more info)  Text page via [facility] Paging/Directory   See signed in provider for up to date coverage information  ______________________________________________________________________    Interval History    No acute events overnight, nursing notes reviewed.     Feeling overall overwhelmed today. Pain poorly controlled overall, primarily in bottom/pressure ulcers but legs as well. Hasn't had legs wrapped yet, feel tense. Trying to take in PO, but oral pain has continued, now with a blister on top and bottom of gums.     5-pt ROS otherwise negative       Physical Exam   Vital Signs: Temp: 97.4  F (36.3  C) Temp src: Oral BP: 98/67 Pulse: 67   Resp: 18 SpO2: 96 % O2 Device: None (Room air)    Weight: 136 lbs 10.96 oz  Gen: alert, interactive and pleasant, lying in bed and tearful and  feeling overwhelmed today, chronically ill appearing   HEENT: Normocephalic/atraumatic, sclera clear, edentulous, oropharynx with mildly dry mucous membranes, evolution of apthous ulcers on upper and lower middle gums  Resp: Clear bilaterally anteriorly, easy work of breathing on room air, no wheezing, shallow breathing   CV: Regular rate and rhythm, no murmurs noted    Abd: soft, diffusely tender, nondistended. Suprapubic cath in place without bleeding noted   Ext: 3+ lower extremity edema, large weeping blister on L shin  Neuro: Alert and oriented x4, grossly non-focal, generalized weakness but moving all extremities equally     Medical Decision Making       55 MINUTES SPENT BY ME on the date of service doing chart review, history, exam, documentation & further activities per the note.      Data

## 2025-02-08 NOTE — PLAN OF CARE
"Goal Outcome Evaluation:      Plan of Care Reviewed With: patient    Overall Patient Progress: no change    Outcome Evaluation: Pt.is alert and oriented; appeared tearful during encounters; pt. comforted and supported; writer tried providing solutions to pt. Rated pain 9/10 on pain scale in lower extremities; PRN pain medications given. Lymphedema wraps changed by OT @1130; @1515 pt. stated she wanted lymph wraps removed; after education pt. agreed to keep wraps on. Repo as tolerated. Plan of care ongoing.     /62 (BP Location: Right arm)   Pulse 87   Temp 97.5  F (36.4  C) (Oral)   Resp 18   Ht 1.6 m (5' 3\")   Wt 62 kg (136 lb 11 oz)   SpO2 98%   BMI 24.21 kg/m      Farzana Osepek on 2/8/2025 at 6:14 PM              "

## 2025-02-09 ENCOUNTER — APPOINTMENT (OUTPATIENT)
Dept: OCCUPATIONAL THERAPY | Facility: CLINIC | Age: 70
End: 2025-02-09
Payer: MEDICARE

## 2025-02-09 LAB
ANION GAP SERPL CALCULATED.3IONS-SCNC: 9 MMOL/L (ref 7–15)
BUN SERPL-MCNC: 23.7 MG/DL (ref 8–23)
CALCIUM SERPL-MCNC: 8.5 MG/DL (ref 8.8–10.4)
CHLORIDE SERPL-SCNC: 102 MMOL/L (ref 98–107)
CREAT SERPL-MCNC: 1.01 MG/DL (ref 0.51–0.95)
EGFRCR SERPLBLD CKD-EPI 2021: 59 ML/MIN/1.73M2
ERYTHROCYTE [DISTWIDTH] IN BLOOD BY AUTOMATED COUNT: 18.9 % (ref 10–15)
GLUCOSE SERPL-MCNC: 119 MG/DL (ref 70–99)
HCO3 SERPL-SCNC: 23 MMOL/L (ref 22–29)
HCT VFR BLD AUTO: 22.9 % (ref 35–47)
HGB BLD-MCNC: 7.5 G/DL (ref 11.7–15.7)
MCH RBC QN AUTO: 29 PG (ref 26.5–33)
MCHC RBC AUTO-ENTMCNC: 32.8 G/DL (ref 31.5–36.5)
MCV RBC AUTO: 88 FL (ref 78–100)
PLATELET # BLD AUTO: 172 10E3/UL (ref 150–450)
POTASSIUM SERPL-SCNC: 4 MMOL/L (ref 3.4–5.3)
RBC # BLD AUTO: 2.59 10E6/UL (ref 3.8–5.2)
SODIUM SERPL-SCNC: 134 MMOL/L (ref 135–145)
WBC # BLD AUTO: 7.4 10E3/UL (ref 4–11)

## 2025-02-09 PROCEDURE — P9047 ALBUMIN (HUMAN), 25%, 50ML: HCPCS | Mod: JZ | Performed by: INTERNAL MEDICINE

## 2025-02-09 PROCEDURE — 250N000011 HC RX IP 250 OP 636: Mod: JZ | Performed by: INTERNAL MEDICINE

## 2025-02-09 PROCEDURE — 99232 SBSQ HOSP IP/OBS MODERATE 35: CPT | Performed by: INTERNAL MEDICINE

## 2025-02-09 PROCEDURE — 97140 MANUAL THERAPY 1/> REGIONS: CPT | Mod: GO

## 2025-02-09 PROCEDURE — 85027 COMPLETE CBC AUTOMATED: CPT | Performed by: INTERNAL MEDICINE

## 2025-02-09 PROCEDURE — 80048 BASIC METABOLIC PNL TOTAL CA: CPT | Performed by: INTERNAL MEDICINE

## 2025-02-09 PROCEDURE — 36591 DRAW BLOOD OFF VENOUS DEVICE: CPT | Performed by: INTERNAL MEDICINE

## 2025-02-09 PROCEDURE — 120N000002 HC R&B MED SURG/OB UMMC

## 2025-02-09 PROCEDURE — 250N000013 HC RX MED GY IP 250 OP 250 PS 637: Performed by: NURSE PRACTITIONER

## 2025-02-09 PROCEDURE — 250N000013 HC RX MED GY IP 250 OP 250 PS 637: Performed by: INTERNAL MEDICINE

## 2025-02-09 RX ORDER — ALBUMIN (HUMAN) 12.5 G/50ML
50 SOLUTION INTRAVENOUS ONCE
Status: COMPLETED | OUTPATIENT
Start: 2025-02-09 | End: 2025-02-09

## 2025-02-09 RX ADMIN — ACETAMINOPHEN 650 MG: 325 TABLET, FILM COATED ORAL at 15:34

## 2025-02-09 RX ADMIN — HYDROMORPHONE HYDROCHLORIDE 2 MG: 2 TABLET ORAL at 15:34

## 2025-02-09 RX ADMIN — HYDROMORPHONE HYDROCHLORIDE 2 MG: 2 TABLET ORAL at 06:19

## 2025-02-09 RX ADMIN — HYDROMORPHONE HYDROCHLORIDE 2 MG: 2 TABLET ORAL at 03:09

## 2025-02-09 RX ADMIN — HYDROMORPHONE HYDROCHLORIDE 2 MG: 2 TABLET ORAL at 12:35

## 2025-02-09 RX ADMIN — HYDROMORPHONE HYDROCHLORIDE 2 MG: 2 TABLET ORAL at 01:06

## 2025-02-09 RX ADMIN — APIXABAN 5 MG: 5 TABLET, FILM COATED ORAL at 08:53

## 2025-02-09 RX ADMIN — ALBUMIN (HUMAN) 50 G: 0.25 INJECTION, SOLUTION INTRAVENOUS at 15:04

## 2025-02-09 RX ADMIN — Medication 1 TABLET: at 08:52

## 2025-02-09 RX ADMIN — Medication 15 MG: at 08:52

## 2025-02-09 RX ADMIN — HYDROMORPHONE HYDROCHLORIDE 2 MG: 2 TABLET ORAL at 21:04

## 2025-02-09 RX ADMIN — HYDROMORPHONE HYDROCHLORIDE 2 MG: 2 TABLET ORAL at 18:35

## 2025-02-09 RX ADMIN — Medication 5 ML: at 12:36

## 2025-02-09 RX ADMIN — ACETAMINOPHEN 650 MG: 325 TABLET, FILM COATED ORAL at 21:04

## 2025-02-09 RX ADMIN — APIXABAN 5 MG: 5 TABLET, FILM COATED ORAL at 21:04

## 2025-02-09 RX ADMIN — HYDROMORPHONE HYDROCHLORIDE 2 MG: 2 TABLET ORAL at 08:54

## 2025-02-09 RX ADMIN — ACETAMINOPHEN 650 MG: 325 TABLET, FILM COATED ORAL at 12:35

## 2025-02-09 RX ADMIN — ACETAMINOPHEN 650 MG: 325 TABLET, FILM COATED ORAL at 08:52

## 2025-02-09 RX ADMIN — SENNOSIDES AND DOCUSATE SODIUM 1 TABLET: 50; 8.6 TABLET ORAL at 08:53

## 2025-02-09 RX ADMIN — SENNOSIDES AND DOCUSATE SODIUM 1 TABLET: 50; 8.6 TABLET ORAL at 21:04

## 2025-02-09 ASSESSMENT — ACTIVITIES OF DAILY LIVING (ADL)
ADLS_ACUITY_SCORE: 64
ADLS_ACUITY_SCORE: 68
ADLS_ACUITY_SCORE: 68
ADLS_ACUITY_SCORE: 64
ADLS_ACUITY_SCORE: 68
ADLS_ACUITY_SCORE: 68
ADLS_ACUITY_SCORE: 64
ADLS_ACUITY_SCORE: 68
ADLS_ACUITY_SCORE: 64
ADLS_ACUITY_SCORE: 62

## 2025-02-09 NOTE — PROGRESS NOTES
[facility]    Medicine Progress Note - Hospitalist Service, GOLD TEAM 22    Date of Admission:  2/5/2025    Assessment & Plan   Alis Hartman is a 69 year old woman admitted on 2/5/2025. 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     Today   - Cr improved notably, repeat albumin today for likely last dose  - no changes to pain regimen, some fluctuating drowisiness and will try to avoid oversedation, may need occasional extra dose (call crosscover if pain uncontrolled)  - monitor for bleeding    # MADHU on CKD3   # hyponatremia, likely hypovolemic    Cr worsened on admission compared to prior, slowly worsening since 1/25. Overall studies suggest prerenal but didn't improve much with small IVF+ encouraging PO intake but this has been limited. History of  hydronephrosis related to her endometrial cancer s/p PNT removal, L just in December 2024. Cystatin C expectedly lower, GFR estimated to be <30.   Hyponatremia has been worsening in the last few weeks in the setting of poor PO intake, now likely prerenal MADHU, poor nutrition. Renal US negative for hydronephrosis. Cr and electrolytes improving on IV albumin  - Cr improved and will give IV albumin again today  - renal consulted, appreciate recs, should likely see after discharge     # Chronic bilateral lower extremity edema  # Deconditioning  # Sacral pressure ulcer, POA, worsening   # Severe malnutrition in the context of chronic illness   Hx of chronic edema, no PTA diuretics. Echo 11/24 reassuring EF but poor study, cant comment on diastolic  function, edema likely due to some degree of proteinuria and low oncotic pressure with low albumin/malnutrition.  Last hospitalization recommendation was to transitional care unit last hospitalization, however per patient preference and for her mental health discharged to home with home care and family support in place. Unfortunately symptoms continued to progress at home   - Followed now by lymphedema, WOC RN, PT,  RD   - social work consulted re:placement/discharge planning   - continue dilaudid PO, ok to give occasional addition if needed     #Hx of ESBL urosepsis and bacteremia  #Bladder dysfunction s/p cystostomy and suprapubic catheter  Recently admitted 11/26 - 12/8/2024 for ESBL urosepsis and bacteremia requiring ICU w/ L nephrostomy tube (removed 1/7) treated w/ ertapenem, returned 1/25-1/27 for concern for UTI but no clear infection at that time.  ID was consulted that admission and recommended avoiding frequent UA and Ucx checks in future unless patient w/ symptoms of UTI.   - UA/Ucx abnormal at admission, likely colonization, will continue to monitor off antibiotics   - may be overdue for suprapubic cath exchange     #Serous endometrial carcinoma  Follows w/ heme/onc through [facility]. S/p SNEHA, BSO 07/2024 s/p cycle 3 carbo/taxel 10/15/2024, cycle 4 delayed in s/o recent admission, family ultimately decided to forgo any further treatment.      #Chronic anemia, stable  #? Vaginal vs ureteral bleeding  Likely multifactorial 2/2 malignancy, renal disease, nutrition. Likely hemoconcentrated at admission, down to 7.5 and recent baseline appears near this   - monitor for bleeding           Diet: Combination Diet Regular Diet Adult  Snacks/Supplements Adult: Other; Send chocolate Ensure Enlive w/ breakfast, strawberry Ensure+HP with dinner; With Meals  Fluid restriction 2000 ML FLUID    DVT Prophylaxis: DOAC  Shahid Catheter: Not present  Lines: PRESENT      Port a Cath 02/05/25 Single Lumen Right Chest wall-Site  Assessment: WDL      Cardiac Monitoring: None  Code Status: Full Code      Clinically Significant Risk Factors         # Hyponatremia: Lowest Na = 131 mmol/L in last 2 days, will monitor as appropriate       # Hypoalbuminemia: Lowest albumin = 1.9 g/dL at 2/5/2025  2:52 PM, will monitor as appropriate     # Hypertension: Noted on problem list             # Severe Malnutrition: based on nutrition assessment, PRESENT ON ADMISSION   # Financial/Environmental Concerns: unable to afford rent/mortgage (pt interested in resources/info on getting assistance paying rent, as well as applying for county benefits)         Social Drivers of Health            Disposition Plan     Medically Ready for Discharge: Anticipated in 2-4 Days             Tashia Brantley MD  Hospitalist Service, GOLD TEAM 22  [facility]  Securely message with [employer] (more info)  Text page via [facility] Paging/Directory   See signed in provider for up to date coverage information  ______________________________________________________________________    Interval History    No acute events overnight, nursing notes reviewed.     Feeling overall ok, similar to yesterday, still feels pain (legs, coccyx) difficult to control.  RN notes periods of relief when patient sleepy after meds, though turns are always challenging. Tolerating some PO, but mouth ulcers limit this. No worsened bleeding.     5-pt ROS otherwise negative, including for new fevers, chest pain, shortness of breath, abdominal pain, or nausea/vomiting.        Physical Exam   Vital Signs: Temp: 97.4  F (36.3  C) Temp src: Oral BP: 113/61 Pulse: 85   Resp: 18 SpO2: 98 % O2 Device: None (Room air)    Weight: 136 lbs 10.96 oz    Gen: alert, interactive and pleasant, lying in bed, chronically ill appearing but more comfortable today   HEENT: Normocephalic/atraumatic, sclera clear, edentulous, oropharynx with mildly dry mucous membranes, stable ulcer on upper and  lower middle gums  Resp: Clear bilaterally anteriorly, easy work of breathing on room air, no wheezing, shallow breathing   CV: Regular rate and rhythm, no murmurs noted    Abd: soft, diffusely tender, nondistended. Suprapubic cath in place without bleeding noted   Ext: LE wrapped, pitting edema noted above wraps  Neuro: Alert and oriented x4, grossly non-focal, generalized weakness but moving all extremities equally       Medical Decision Making       35 MINUTES SPENT BY ME on the date of service doing chart review, history, exam, documentation & further activities per the note.      Data     I have personally reviewed the following data over the past 24 hrs:    7.4  \   7.5 (L)   / 172     134 (L) 102 23.7 (H) /  119 (H)   4.0 23 1.01 (H) \

## 2025-02-09 NOTE — PROGRESS NOTES
Care Management Follow Up    Length of Stay (days): 4    Expected Discharge Date: 02/17/2025     Concerns to be Addressed: discharge planning     Patient plan of care discussed at interdisciplinary rounds: Yes    Anticipated Discharge Disposition:  (TCU vs Home Care (pt has preference of home))     Anticipated Discharge Services:  (if discharging home, [facility] home care RN/PT/OT)  Anticipated Discharge DME:  (No new DME anticipated)    Patient/family educated on Medicare website which has current facility and service quality ratings: yes  Education Provided on the Discharge Plan: Other (see comments) (plan in progress)  Patient/Family in Agreement with the Plan: yes    Referrals Placed by CM/SW:  (None currently)  Private pay costs discussed: Not applicable    Discussed  Partnership in Safe Discharge Planning  document with patient/family: No     Handoff Completed: No, handoff not indicated or clinically appropriate    Additional Information:  BANDAR met with patient and daughterJoy ([phone]) at bedside to discuss discharge planning. PT/OT rec TCU. Patient reports she had a bad experience at TCU in past ([facility]) and unsure she wants to go again. Patient appeared overwhelmed with discussion and her daughter was agreeable to taking and reviewing the list and they want to talk as a family. SW stated goal for treatment team is to help put together a safe discharge plan.     SW left list with patient and her daughter. SW to follow up tomorrow.     Next Steps: -Follow up with patient and family re: discharge plan (home vs TCU) and make referrals as appropriate  -IMM, Handoff, etc      Marilin Watson, WILLIAM, LICSW  2/9/2025       Social Work and Care Management Department       SEARCHABLE in [facility] - search SOCIAL WORK       [place] ([phone]) Saturday and Sunday     Units: 4A Vocera, 4C Vocera, & 4E Vocera        Units: 5A [id number] Vocera, 5A [phone] Vocera , BMT SW 1 BMT SW 2,  BMT SW 3 & BMT SW 4  5C Off Service [phone]  5C Off Service [phone]     Units: 6A Vocera & 6B Vocera      Units: 6C Vocera     Units: 7A Vocera & 7B Vocera      Units: 7C Med Surg 7401 thru 7418 and 7C Med Surg 7502 thru 7521      Unit: [place] ED Vocera & [place] Obs Vocera     [facility] [place] ([phone]) Saturday and Sunday      Units: 5 Ortho Vocera, 5 Med Surg Vocera & WB ED Vocera     Units: 6 Med Surg Vocera, 8 Med Surg Vocera, & 10 ICU Vocera      After hours Vocera [facility] [place] and After Hours Vocera [place]     Please NOTE changes to times below:    **Saturday & Sunday (1630 - 2030)    **Mon-Fri ([phone])     **[facility] Recognized Holidays  ([phone])    Units: ALL   - see above VOCERA links to units

## 2025-02-09 NOTE — PLAN OF CARE
"Goal Outcome Evaluation:       Vitally stable, /87   Pulse 86   Temp 98  F (36.7  C) (Oral)   Resp 18   Ht 1.6 m (5' 3\")   Wt 62 kg (136 lb 11 oz)   SpO2 98%   BMI 24.21 kg/m        Alert and oriented X4.      Pain managed with oral dilaudid, IV dilaudid, tylenol.     Patient called to states she was bleeding and writer saw blood at her perineal area. Provider informed. Provider came to patient's room and accessed patient,. Vital signs checked and is within normal limits.     Patient has suprapubic catheter.     Has right chest port saline locked.     Perineal care done, linen and gown changed. Brief applied with assist of two.    On contact precautions for MRSA and ESBL.     On 2000 ml fluid restriction.     Repositioned per patient's request (more frequently than every 2 hours).    Continue plan of care.                 "

## 2025-02-09 NOTE — PLAN OF CARE
"Goal Outcome Evaluation:  Blood pressure 104/87, pulse 86, temperature 98  F (36.7  C), temperature source Oral, resp. rate 18, height 1.6 m (5' 3\"), weight 62 kg (136 lb 11 oz), SpO2 98%, not currently breastfeeding.  Pt alert with intermittent confusion. Co pain 9/10 managed using scheduled 2 mg Dilaudid. Pt is on RA no SOB noted. Pt not oob with Q2hr scheduled turn. SP catheter in placed. Urine with blood tinge color. Compression wrap BLE intacted. Dressing on pt sacral dry clean and intact. Pt has small to medium bleeding. Call light within pt's reach.      Plan of Care Reviewed With: patient     Problem: Adult Inpatient Plan of Care  Goal: Plan of Care Review  Description: The Plan of Care Review/Shift note should be completed every shift.  The Outcome Evaluation is a brief statement about your assessment that the patient is improving, declining, or no change.  This information will be displayed automatically on your shift  note.  Outcome: Progressing  Flowsheets (Taken 2/9/2025 0148)  Plan of Care Reviewed With: patient  Goal: Patient-Specific Goal (Individualized)  Description: You can add care plan individualizations to a care plan. Examples of Individualization might be:  \"Parent requests to be called daily at 9am for status\", \"I have a hard time hearing out of my right ear\", or \"Do not touch me to wake me up as it startles  me\".  Outcome: Progressing  Goal: Absence of Hospital-Acquired Illness or Injury  Outcome: Progressing  Goal: Optimal Comfort and Wellbeing  Outcome: Progressing  Goal: Readiness for Transition of Care  Outcome: Progressing     Problem: Skin Injury Risk Increased  Goal: Skin Health and Integrity  Outcome: Not Progressing         "

## 2025-02-09 NOTE — PROGRESS NOTES
Brief medicine cross-cover note note:  - Notified by RN that pt c/o vaginal bleeding. Met with pt bedside and she appears non-toxic and in NAD. States this afternoon she had an itch in her perineal area, of which she felt something wet and then noticed bright red blood on over her vagina area and upper thighs. Denies lightheadedness and other acute physical concerns other than her ongoing BLE pain of which she states her pain currently is not controlled. States that vaginal bleeding is not new within context of known prior cervical cancer diagnosis, of which she states similarly happened ~ 2 months ago that resolved without intervention. No brisk bleeding on exam, only small amount of bright red blood over perineal area and not able to differentiate whether truly from vagina or urethra within context of recent suprapubic catheter exchange of which she say she was told she may have some bloody urine output. D/w RN to clean area up and continue to monitor closely and alert medicine if bleeding continues or gets worse. Pt is HDS and Hgb ordered for tomorrow am. Will give pt one time additional dose of IV Dilaudid for ongoing BLE pain.     Talib Casiano PA-C  Internal Medicine Hospitalist   [facility]  [phone]

## 2025-02-10 ENCOUNTER — APPOINTMENT (OUTPATIENT)
Dept: OCCUPATIONAL THERAPY | Facility: CLINIC | Age: 70
End: 2025-02-10
Payer: MEDICARE

## 2025-02-10 LAB
ANION GAP SERPL CALCULATED.3IONS-SCNC: 11 MMOL/L (ref 7–15)
BACTERIA BLD CULT: NO GROWTH
BACTERIA BLD CULT: NO GROWTH
BUN SERPL-MCNC: 21.3 MG/DL (ref 8–23)
CALCIUM SERPL-MCNC: 8.8 MG/DL (ref 8.8–10.4)
CHLORIDE SERPL-SCNC: 103 MMOL/L (ref 98–107)
CREAT SERPL-MCNC: 0.9 MG/DL (ref 0.51–0.95)
EGFRCR SERPLBLD CKD-EPI 2021: 68 ML/MIN/1.73M2
ERYTHROCYTE [DISTWIDTH] IN BLOOD BY AUTOMATED COUNT: 19 % (ref 10–15)
GLUCOSE SERPL-MCNC: 116 MG/DL (ref 70–99)
HCO3 SERPL-SCNC: 22 MMOL/L (ref 22–29)
HCT VFR BLD AUTO: 23 % (ref 35–47)
HGB BLD-MCNC: 7.9 G/DL (ref 11.7–15.7)
MCH RBC QN AUTO: 30.3 PG (ref 26.5–33)
MCHC RBC AUTO-ENTMCNC: 34.3 G/DL (ref 31.5–36.5)
MCV RBC AUTO: 88 FL (ref 78–100)
PLATELET # BLD AUTO: 171 10E3/UL (ref 150–450)
POTASSIUM SERPL-SCNC: 4.2 MMOL/L (ref 3.4–5.3)
RBC # BLD AUTO: 2.61 10E6/UL (ref 3.8–5.2)
SODIUM SERPL-SCNC: 136 MMOL/L (ref 135–145)
WBC # BLD AUTO: 8.7 10E3/UL (ref 4–11)

## 2025-02-10 PROCEDURE — 36591 DRAW BLOOD OFF VENOUS DEVICE: CPT | Performed by: INTERNAL MEDICINE

## 2025-02-10 PROCEDURE — 250N000011 HC RX IP 250 OP 636: Mod: JZ | Performed by: INTERNAL MEDICINE

## 2025-02-10 PROCEDURE — 85027 COMPLETE CBC AUTOMATED: CPT | Performed by: INTERNAL MEDICINE

## 2025-02-10 PROCEDURE — 250N000013 HC RX MED GY IP 250 OP 250 PS 637: Performed by: INTERNAL MEDICINE

## 2025-02-10 PROCEDURE — 99233 SBSQ HOSP IP/OBS HIGH 50: CPT | Performed by: INTERNAL MEDICINE

## 2025-02-10 PROCEDURE — 84520 ASSAY OF UREA NITROGEN: CPT | Performed by: INTERNAL MEDICINE

## 2025-02-10 PROCEDURE — 82435 ASSAY OF BLOOD CHLORIDE: CPT | Performed by: INTERNAL MEDICINE

## 2025-02-10 PROCEDURE — P9047 ALBUMIN (HUMAN), 25%, 50ML: HCPCS | Mod: JZ | Performed by: INTERNAL MEDICINE

## 2025-02-10 PROCEDURE — 85041 AUTOMATED RBC COUNT: CPT | Performed by: INTERNAL MEDICINE

## 2025-02-10 PROCEDURE — 250N000013 HC RX MED GY IP 250 OP 250 PS 637: Performed by: NURSE PRACTITIONER

## 2025-02-10 PROCEDURE — 120N000002 HC R&B MED SURG/OB UMMC

## 2025-02-10 RX ORDER — ALBUMIN (HUMAN) 12.5 G/50ML
50 SOLUTION INTRAVENOUS ONCE
Status: COMPLETED | OUTPATIENT
Start: 2025-02-10 | End: 2025-02-10

## 2025-02-10 RX ADMIN — APIXABAN 5 MG: 5 TABLET, FILM COATED ORAL at 09:03

## 2025-02-10 RX ADMIN — ACETAMINOPHEN 650 MG: 325 TABLET, FILM COATED ORAL at 09:03

## 2025-02-10 RX ADMIN — Medication 5 ML: at 12:04

## 2025-02-10 RX ADMIN — ACETAMINOPHEN 650 MG: 325 TABLET, FILM COATED ORAL at 19:41

## 2025-02-10 RX ADMIN — SENNOSIDES AND DOCUSATE SODIUM 1 TABLET: 50; 8.6 TABLET ORAL at 09:03

## 2025-02-10 RX ADMIN — HYDROMORPHONE HYDROCHLORIDE 2 MG: 2 TABLET ORAL at 12:05

## 2025-02-10 RX ADMIN — ALBUMIN (HUMAN) 50 G: 0.25 INJECTION, SOLUTION INTRAVENOUS at 12:05

## 2025-02-10 RX ADMIN — POLYETHYLENE GLYCOL 3350 17 G: 17 POWDER, FOR SOLUTION ORAL at 14:33

## 2025-02-10 RX ADMIN — HYDROMORPHONE HYDROCHLORIDE 2 MG: 2 TABLET ORAL at 15:07

## 2025-02-10 RX ADMIN — Medication 15 MG: at 09:03

## 2025-02-10 RX ADMIN — HYDROMORPHONE HYDROCHLORIDE 2 MG: 2 TABLET ORAL at 21:01

## 2025-02-10 RX ADMIN — ACETAMINOPHEN 650 MG: 325 TABLET, FILM COATED ORAL at 15:53

## 2025-02-10 RX ADMIN — HYDROMORPHONE HYDROCHLORIDE 2 MG: 2 TABLET ORAL at 06:22

## 2025-02-10 RX ADMIN — HYDROMORPHONE HYDROCHLORIDE 2 MG: 2 TABLET ORAL at 09:03

## 2025-02-10 RX ADMIN — HYDROMORPHONE HYDROCHLORIDE 2 MG: 2 TABLET ORAL at 00:04

## 2025-02-10 RX ADMIN — ACETAMINOPHEN 650 MG: 325 TABLET, FILM COATED ORAL at 12:05

## 2025-02-10 RX ADMIN — Medication 1 MG: at 17:59

## 2025-02-10 RX ADMIN — SENNOSIDES AND DOCUSATE SODIUM 1 TABLET: 50; 8.6 TABLET ORAL at 19:41

## 2025-02-10 RX ADMIN — APIXABAN 5 MG: 5 TABLET, FILM COATED ORAL at 19:42

## 2025-02-10 RX ADMIN — HYDROMORPHONE HYDROCHLORIDE 2 MG: 2 TABLET ORAL at 03:40

## 2025-02-10 RX ADMIN — Medication 1 TABLET: at 09:03

## 2025-02-10 RX ADMIN — HYDROMORPHONE HYDROCHLORIDE 2 MG: 2 TABLET ORAL at 17:59

## 2025-02-10 ASSESSMENT — ACTIVITIES OF DAILY LIVING (ADL)
ADLS_ACUITY_SCORE: 68

## 2025-02-10 NOTE — PROGRESS NOTES
Care Management Follow Up    Length of Stay (days): 5    Expected Discharge Date: 02/12/2025     Concerns to be Addressed: discharge planning     Patient plan of care discussed at interdisciplinary rounds: Yes    Anticipated Discharge Disposition:  (TCU vs Home Care (pt has preference of home))     Anticipated Discharge Services:  (if discharging home, [facility] home care RN/PT/OT)  Anticipated Discharge DME:  (No new DME anticipated)    Patient/family educated on Medicare website which has current facility and service quality ratings: yes  Education Provided on the Discharge Plan: Other (see comments) (plan in progress)  Patient/Family in Agreement with the Plan: yes    Referrals Placed by CM/SW:  (None currently)  Private pay costs discussed: Not applicable    Discussed  Partnership in Safe Discharge Planning  document with patient/family: No     Handoff Completed: No, handoff not indicated or clinically appropriate    Additional Information:    Per hospitalist, patient likely to me ready for discharge in 1-2 days.    SW met with patient at bedside to discuss discharge disposition. Patient maintains that she does not want to go to TCU but will talk to her daughter. Patient OK with SW calling daughter to see if daughter reviewed TCU list.    SW called and spoke with patient's daughter, Joy. Joy states that the family would be okay with patient going to TCU but would also be okay with taking her home. BANDAR shared that the recommendation remains TCU and Joy noted that she wants to tour the facilities before agreeing to have patient go there. BNADAR shared that once patient is medically ready for discharge, we will have to move forward with finding TCU facility and discharging and we may not be able to wait until she can tour the facilities. Joy states that the family may prefer for patient to just go back home with their supports if they cannot find a good facility. Joy agreed to talk to patient and  their other family and discuss what they would like to do moving forward. They will expect a call from care management tomorrow.    Next Steps:     Speak with patient and/or family tomorrow to discuss preferred disposition and move forward accordingly.         PHAM Tirado, LSW  8 Med Surg   [facility]  Phone: [phone]  Vocera: Searchable by name or group: 8 Med Surg Vocera

## 2025-02-10 NOTE — PROGRESS NOTES
[facility]    Medicine Progress Note - Hospitalist Service, GOLD TEAM 22    Date of Admission:  2/5/2025    Assessment & Plan   Alis Hartman is a 69 year old woman admitted on 2/5/2025. 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     Today   - Cr improved, repeat albumin today for last dose   -  Having some blood in depends again, unclear source vaginal vs urethral, will try to reevaluate when WOC evaluates her sacral wounds   - dental hygiene consult re: oral ulcers   - no changes to pain regimen, some fluctuating drowisiness and will try to avoid oversedation, may need occasional extra dose (call crosscover if pain uncontrolled)  - appreciate SW/RNCC investigation for eventual TCU placement     # MADHU on CKD3 - improving   # hyponatremia, likely hypovolemic - resolved    Cr worsened on admission compared to prior, slowly worsening since 1/25. Overall studies suggest prerenal but iniitally didn't improve much with small IVF+ encouraging PO intake but this has been limited. History of  hydronephrosis related to her endometrial cancer s/p PNT removal, L just in December 2024. Cystatin C expectedly lower, GFR estimated to be <30.   Hyponatremia has been worsening in the last few weeks in the setting of poor PO intake, now likely prerenal MADHU, poor nutrition. Renal US negative for hydronephrosis. Cr peaked at 1.44, now Cr and electrolytes improving on IV albumin, now almost back to prior baseline   - Cr improved and will give IV albumin again today, last planned dose   - renal consulted,  appreciate recs, have since signed off but should follow up outpatient given CKD     # Chronic bilateral lower extremity edema  # Deconditioning  # Sacral pressure ulcer, POA, worsening   # Severe malnutrition in the context of chronic illness   Hx of chronic edema, no PTA diuretics. Echo 11/24 reassuring EF but poor study, cant comment on diastolic function, edema likely due to some degree of proteinuria and low oncotic pressure with low albumin/malnutrition.  Last hospitalization recommendation was to transitional care unit last hospitalization, however per patient preference and for her mental health discharged to home with home care and family support in place. Unfortunately symptoms continued to progress at home   - Followed now by lymphedema, WOC RN, PT,  RD   - social work consulted re:placement/discharge planning   - continue dilaudid PO, ok to give occasional addition if needed     #Hx of ESBL urosepsis and bacteremia  #Bladder dysfunction s/p cystostomy and suprapubic catheter  Recently admitted 11/26 - 12/8/2024 for ESBL urosepsis and bacteremia requiring ICU w/ L nephrostomy tube (removed 1/7) treated w/ ertapenem, returned 1/25-1/27 for concern for UTI but no clear infection at that time.  ID was consulted that admission and recommended avoiding frequent UA and Ucx checks in future unless patient w/ symptoms of UTI.   - UA/Ucx abnormal at admission, likely colonization, will continue to monitor off antibiotics   - may be overdue for suprapubic cath exchange     #Serous endometrial carcinoma  Follows w/ heme/onc through [place]. S/p SNEHA, BSO 07/2024 s/p cycle 3 carbo/taxel 10/15/2024, cycle 4 delayed in s/o recent admission, family ultimately decided to forgo any further treatment.      #Chronic anemia, stable  #? Vaginal vs ureteral bleeding  Likely multifactorial 2/2 malignancy, renal disease, nutrition. Likely hemoconcentrated at admission, down to 7.5 and recent baseline appears near this   -  monitor for bleeding           Diet: Combination Diet Regular Diet Adult  Snacks/Supplements Adult: Other; Send chocolate Ensure Enlive w/ breakfast, strawberry Ensure+HP with dinner; With Meals  Fluid restriction 2000 ML FLUID    DVT Prophylaxis: DOAC  Shahid Catheter: Not present  Lines: PRESENT      Port a Cath 02/05/25 Single Lumen Right Chest wall-Site Assessment: WDL      Cardiac Monitoring: None  Code Status: Full Code      Clinically Significant Risk Factors         # Hyponatremia: Lowest Na = 134 mmol/L in last 2 days, will monitor as appropriate       # Hypoalbuminemia: Lowest albumin = 1.9 g/dL at 2/5/2025  2:52 PM, will monitor as appropriate     # Hypertension: Noted on problem list             # Severe Malnutrition: based on nutrition assessment    # Financial/Environmental Concerns: unable to afford rent/mortgage (pt interested in resources/info on getting assistance paying rent, as well as applying for county benefits)         Social Drivers of Health            Disposition Plan     Medically Ready for Discharge: Anticipated in 2-4 Days             Tashia Brantley MD  Hospitalist Service, GOLD TEAM 22  [facility]  Securely message with [employer] (more info)  Text page via [facility] Paging/Directory   See signed in provider for up to date coverage information  ______________________________________________________________________    Interval History    No acute events overnight, nursing notes reviewed. Patient reports difficulty sleeping, poor pain control overnight, feels the nurses get frustrated with how often she uses the call light. Pain primarily in her buttocks, legs seem smaller distally but now thigh swelling is worse. Oral ulcers unchanged. Having some blood in depends again, unclear source     5-pt ROS otherwise negative, including for new fevers, chest pain, shortness of breath, abdominal pain, or nausea/vomiting.       Physical Exam   Vital Signs:  Temp: 98.3  F (36.8  C) Temp src: Oral BP: 104/70 Pulse: 96   Resp: 16 SpO2: 100 % O2 Device: None (Room air)    Weight: 136 lbs 10.96 oz    Gen: alert, interactive and pleasant, lying in bed, chronically ill appearing, appears comfortable but easily becomes tearful   HEENT: Normocephalic/atraumatic, sclera clear, edentulous, oropharynx with mildly dry mucous membranes, stable ulcer on upper and lower middle gums  Resp: Clear bilaterally anteriorly, easy work of breathing on room air, no wheezing, shallow breathing   CV: Regular rate and rhythm, no murmurs noted    Abd: soft, diffusely tender, nondistended. Suprapubic cath in place without bleeding noted   : limited exam due to habitus and positioning, some bright red blood on the depends, cannot shift tissue enough to comfortably evaluate vaginal vs urethral source  Ext: distal LE edema much improved, continued 3+ pitting in thighs   Neuro: Alert and oriented x4, grossly non-focal, generalized weakness but moving all extremities equally    Medical Decision Making       55 MINUTES SPENT BY ME on the date of service doing chart review, history, exam, documentation & further activities per the note.      Data     I have personally reviewed the following data over the past 24 hrs:    8.7  \   7.9 (L)   / 171     136 103 21.3 /  116 (H)   4.2 22 0.90 \

## 2025-02-10 NOTE — PROGRESS NOTES
"  [id number]    VS: BP 92/61 (BP Location: Right arm)   Pulse 69   Temp 98.2  F (36.8  C) (Oral)   Resp 18   Ht 1.6 m (5' 3\")   Wt 62 kg (136 lb 11 oz)   SpO2 (!) 89%   BMI 24.21 kg/m     O2: Stable on Room air Denies SOB   Output: Has a suprapubic catheter   Last BM:  Pt can't recall   Activity: Assist of 2    Skin: X Sacrum wound   Pain: Manage  with schedule meds              CMS: A&Ox4 denies chest pain, n/v, numbness/tingling, and dizziness   Dressing: WDL   Diet: regular   LDA: R port cath   Equipment: Personal belongings   Plan: Call light in reach, continue POC   Additional Info:         "

## 2025-02-10 NOTE — PLAN OF CARE
"Goal Outcome Evaluation:      Plan of Care Reviewed With: patient    Overall Patient Progress: no change    Overall Patient Progress: no change      Patient admitted 2/5/25 MADHU, hyponatremia, worsening BLE edema and progressive coccyx sores.    VS: /84 (BP Location: Right arm)   Pulse 103   Temp 98.4  F (36.9  C) (Oral)   Resp 16   Ht 1.6 m (5' 3\")   Wt 62 kg (136 lb 11 oz)   SpO2 (!) 89%   BMI 24.21 kg/m       O2: >90% on RA no complaints of SOB or chest pain.   Output: Supra pubic catheter in place   Last BM: Constipation prior to this shift - Mirilax given and patient has large firm BM.    Activity: A2 with repositioning. Pt not OOB. Education and encouragement with repositioning - pt refuses at times    Skin: Coccyx wound and scattered open wounds to BLE. Severe edema to BLE - lymphedema wraps removed this shift per order for 1 hour but pt refused writer to put back on. MD aware.    Pain: 10/10 pain coccyx   CMS: AOX4, numbness and tingling to BLE   Dressing: Mepilex scattered to BLE - Changed this shift  Mepilex to bilateral elbows - Changed this shift  Mepilex to coccyx - due to be changed 2/13/25   Diet: Regular diet, low appetite.   LDA: R chest Port - dressing change due date 2/12/25   Equipment: Personal belongings   Plan: Awaiting TCU placement or Family may take pt home.   Additional Info:        "

## 2025-02-10 NOTE — PLAN OF CARE
Goal Outcome Evaluation:      Plan of Care Reviewed With: patient    Overall Patient Progress: improving    Outcome Evaluation: Pt is stable and cooperative with care. Pain managed with PRN diluadid.    O2: On RA   Output: Has a suprapubic catheter with output, no blood noted in urine this shift   Last BM: Pt does not remember   Activity: Assistance x2 for turns and cares   Skin: X, sacrum, BLEcompression stockinette on   Pain: Managed with scheduled dilaudid q2h   CMS: Intact, A/O x4. Able to communicate needs   Dressing: BLE and sacral wounds cares done    Diet: Regular   LDA: R Port A cath   Equipment: IV pole   Plan: Con't pain management. q2H turns   Additional Info: Pt is calm and cooperative with cares, feels sad and cries sometimes. Daughter was here this shift. No SOB, CP, N/V reported

## 2025-02-11 ENCOUNTER — APPOINTMENT (OUTPATIENT)
Dept: OCCUPATIONAL THERAPY | Facility: CLINIC | Age: 70
DRG: 682 | End: 2025-02-11
Payer: MEDICARE

## 2025-02-11 ENCOUNTER — APPOINTMENT (OUTPATIENT)
Dept: PHYSICAL THERAPY | Facility: CLINIC | Age: 70
DRG: 682 | End: 2025-02-11
Payer: MEDICARE

## 2025-02-11 LAB
ALBUMIN UR-MCNC: ABNORMAL G/DL
ANION GAP SERPL CALCULATED.3IONS-SCNC: 10 MMOL/L (ref 7–15)
APPEARANCE UR: ABNORMAL
BACTERIA #/AREA URNS HPF: ABNORMAL /HPF
BILIRUB UR QL STRIP: ABNORMAL
BUN SERPL-MCNC: 19.2 MG/DL (ref 8–23)
CALCIUM SERPL-MCNC: 9.1 MG/DL (ref 8.8–10.4)
CHLORIDE SERPL-SCNC: 105 MMOL/L (ref 98–107)
COLOR UR AUTO: ABNORMAL
CREAT SERPL-MCNC: 0.72 MG/DL (ref 0.51–0.95)
EGFRCR SERPLBLD CKD-EPI 2021: 89 ML/MIN/1.73M2
ERYTHROCYTE [DISTWIDTH] IN BLOOD BY AUTOMATED COUNT: 19.2 % (ref 10–15)
GLUCOSE SERPL-MCNC: 83 MG/DL (ref 70–99)
GLUCOSE UR STRIP-MCNC: ABNORMAL MG/DL
HCO3 SERPL-SCNC: 24 MMOL/L (ref 22–29)
HCT VFR BLD AUTO: 21.4 % (ref 35–47)
HGB BLD-MCNC: 7.2 G/DL (ref 11.7–15.7)
HGB UR QL STRIP: ABNORMAL
KETONES UR STRIP-MCNC: ABNORMAL MG/DL
LEUKOCYTE ESTERASE UR QL STRIP: ABNORMAL
MCH RBC QN AUTO: 28.9 PG (ref 26.5–33)
MCHC RBC AUTO-ENTMCNC: 33.6 G/DL (ref 31.5–36.5)
MCV RBC AUTO: 86 FL (ref 78–100)
NITRATE UR QL: ABNORMAL
PH UR STRIP: ABNORMAL [PH]
PLATELET # BLD AUTO: 176 10E3/UL (ref 150–450)
POTASSIUM SERPL-SCNC: 4 MMOL/L (ref 3.4–5.3)
RBC # BLD AUTO: 2.49 10E6/UL (ref 3.8–5.2)
RBC URINE: >182 /HPF
SODIUM SERPL-SCNC: 139 MMOL/L (ref 135–145)
SP GR UR STRIP: ABNORMAL
UROBILINOGEN UR STRIP-MCNC: ABNORMAL MG/DL
WBC # BLD AUTO: 6.5 10E3/UL (ref 4–11)
WBC URINE: >182 /HPF

## 2025-02-11 PROCEDURE — 99232 SBSQ HOSP IP/OBS MODERATE 35: CPT | Performed by: STUDENT IN AN ORGANIZED HEALTH CARE EDUCATION/TRAINING PROGRAM

## 2025-02-11 PROCEDURE — 250N000013 HC RX MED GY IP 250 OP 250 PS 637: Performed by: STUDENT IN AN ORGANIZED HEALTH CARE EDUCATION/TRAINING PROGRAM

## 2025-02-11 PROCEDURE — G0463 HOSPITAL OUTPT CLINIC VISIT: HCPCS | Mod: 25

## 2025-02-11 PROCEDURE — 87088 URINE BACTERIA CULTURE: CPT | Performed by: STUDENT IN AN ORGANIZED HEALTH CARE EDUCATION/TRAINING PROGRAM

## 2025-02-11 PROCEDURE — 97602 WOUND(S) CARE NON-SELECTIVE: CPT

## 2025-02-11 PROCEDURE — 250N000013 HC RX MED GY IP 250 OP 250 PS 637: Performed by: NURSE PRACTITIONER

## 2025-02-11 PROCEDURE — 250N000013 HC RX MED GY IP 250 OP 250 PS 637: Performed by: INTERNAL MEDICINE

## 2025-02-11 PROCEDURE — 120N000002 HC R&B MED SURG/OB UMMC

## 2025-02-11 PROCEDURE — 97530 THERAPEUTIC ACTIVITIES: CPT | Mod: GO

## 2025-02-11 PROCEDURE — 80048 BASIC METABOLIC PNL TOTAL CA: CPT | Performed by: INTERNAL MEDICINE

## 2025-02-11 PROCEDURE — 81001 URINALYSIS AUTO W/SCOPE: CPT | Performed by: STUDENT IN AN ORGANIZED HEALTH CARE EDUCATION/TRAINING PROGRAM

## 2025-02-11 PROCEDURE — 250N000009 HC RX 250: Performed by: STUDENT IN AN ORGANIZED HEALTH CARE EDUCATION/TRAINING PROGRAM

## 2025-02-11 PROCEDURE — 97530 THERAPEUTIC ACTIVITIES: CPT | Mod: GP

## 2025-02-11 PROCEDURE — 87186 SC STD MICRODIL/AGAR DIL: CPT | Performed by: STUDENT IN AN ORGANIZED HEALTH CARE EDUCATION/TRAINING PROGRAM

## 2025-02-11 PROCEDURE — 250N000011 HC RX IP 250 OP 636: Performed by: INTERNAL MEDICINE

## 2025-02-11 PROCEDURE — 85027 COMPLETE CBC AUTOMATED: CPT | Performed by: STUDENT IN AN ORGANIZED HEALTH CARE EDUCATION/TRAINING PROGRAM

## 2025-02-11 PROCEDURE — 36591 DRAW BLOOD OFF VENOUS DEVICE: CPT | Performed by: INTERNAL MEDICINE

## 2025-02-11 RX ADMIN — Medication 5 ML: at 11:55

## 2025-02-11 RX ADMIN — Medication 5 ML: at 06:50

## 2025-02-11 RX ADMIN — HYDROMORPHONE HYDROCHLORIDE 2 MG: 2 TABLET ORAL at 06:47

## 2025-02-11 RX ADMIN — HYDROMORPHONE HYDROCHLORIDE 2 MG: 2 TABLET ORAL at 00:06

## 2025-02-11 RX ADMIN — ACETAMINOPHEN 650 MG: 325 TABLET, FILM COATED ORAL at 16:02

## 2025-02-11 RX ADMIN — ACETAMINOPHEN 650 MG: 325 TABLET, FILM COATED ORAL at 20:29

## 2025-02-11 RX ADMIN — HYDROMORPHONE HYDROCHLORIDE 0.3 MG: 1 INJECTION, SOLUTION INTRAMUSCULAR; INTRAVENOUS; SUBCUTANEOUS at 22:27

## 2025-02-11 RX ADMIN — HYDROMORPHONE HYDROCHLORIDE 2 MG: 2 TABLET ORAL at 18:05

## 2025-02-11 RX ADMIN — Medication 5 ML: at 22:33

## 2025-02-11 RX ADMIN — HYDROMORPHONE HYDROCHLORIDE 2 MG: 2 TABLET ORAL at 03:00

## 2025-02-11 RX ADMIN — Medication 15 MG: at 08:35

## 2025-02-11 RX ADMIN — HYDROMORPHONE HYDROCHLORIDE 2 MG: 2 TABLET ORAL at 14:57

## 2025-02-11 RX ADMIN — APIXABAN 5 MG: 5 TABLET, FILM COATED ORAL at 20:29

## 2025-02-11 RX ADMIN — Medication 1 TABLET: at 08:27

## 2025-02-11 RX ADMIN — ACETAMINOPHEN 650 MG: 325 TABLET, FILM COATED ORAL at 08:27

## 2025-02-11 RX ADMIN — APIXABAN 5 MG: 5 TABLET, FILM COATED ORAL at 08:27

## 2025-02-11 RX ADMIN — HYDROMORPHONE HYDROCHLORIDE 2 MG: 2 TABLET ORAL at 11:55

## 2025-02-11 RX ADMIN — HYDROMORPHONE HYDROCHLORIDE 0.3 MG: 1 INJECTION, SOLUTION INTRAMUSCULAR; INTRAVENOUS; SUBCUTANEOUS at 08:28

## 2025-02-11 RX ADMIN — ACETAMINOPHEN 650 MG: 325 TABLET, FILM COATED ORAL at 11:55

## 2025-02-11 RX ADMIN — LIDOCAINE HYDROCHLORIDE 10 ML: 20 SOLUTION ORAL; TOPICAL at 18:45

## 2025-02-11 RX ADMIN — HYDROMORPHONE HYDROCHLORIDE 2 MG: 2 TABLET ORAL at 09:17

## 2025-02-11 RX ADMIN — HYDROMORPHONE HYDROCHLORIDE 2 MG: 2 TABLET ORAL at 21:09

## 2025-02-11 RX ADMIN — SENNOSIDES AND DOCUSATE SODIUM 1 TABLET: 50; 8.6 TABLET ORAL at 08:27

## 2025-02-11 ASSESSMENT — ACTIVITIES OF DAILY LIVING (ADL)
ADLS_ACUITY_SCORE: 65
ADLS_ACUITY_SCORE: 68
ADLS_ACUITY_SCORE: 65

## 2025-02-11 NOTE — PROGRESS NOTES
Dental Hygiene Consult Service    Alis Hartman MRN# [id number]   YOB: 1955 Age: 69 year old     Date of Admission: 2/5/2025   PCP is Maria Fernanda Eckert  Date of Service: 2/11/2025  Hospital Day #: 6         Reason for Consult:   Referring MD & Reason for Visit: I was asked by Kishan Campbell MD, to see Alis Hartman for a LIMITED oral assessment regarding oral sores    *Please note: dental hygiene services, including oral assessment, are not a replacement for examination by a licensed dentist. The patient has been informed of the oral assessment procedures and has provided verbal consent.       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 .  Dental and oropharyngeal history is pertinent for on blood thinners, hx of bacteremia, CKD, HTN.  The patient reports oral ulcers upper and lower alveolar ridges, impeding eating, causing a lot of pain.         OHIP-5 Questionnaire    In the past SEVEN days:   Have you had difficulty chewing any foods because of problems with your teeth, mouth, dentures or jaw? 4 - VERY OFTEN   Have you had painful aching in your mouth? 4 - VERY OFTEN   Have you felt uncomfortable about the appearance of your teeth, mouth dentures or jaws? 4 - VERY OFTEN   Have you felt that there has been less flavor in your food because of problems with your teeth, mouth, dentures or jaws? 4 - VERY OFTEN   Have you had difficulty doing your usual jobs because of problems with your teeth, mouth, dentures or jaws? 4 - VERY OFTEN   Total OHIP  Score 20   General Observations   Level of support Patient requires some assistance:  products brought bedside   Patient currently in pain Yes: Location: upper and lower alveolar ridge/gums, midline.    Patient wears dentures Yes: Both but does not wear often due to not being used to the fit.    Current oral care routine None currently    Patient has oral care products Patient does not have oral care products   Extraoral assessment   General appearance Symmetrical and Normal TMJ function   Lymph nodes Symmetrical, no abnormalities, non-tender   Oral Health Assessment Tool (OHAT)   Lips 0=Healthy: smooth, pink, moist   Tongue 0=Healthy: normal, moist, roughness, pink   Gums and Tissues 2=Unhealthy: (ie. swollen, bleeding, ulcers, white/red patches, generalized redness (under dentures) )- see photos. Ulcerated and white lesions upper and lower alveolar ridge, midline.    Saliva 0=Healthy: moist tissues, watery and free flowing saliva   Natural Teeth Yes/No Patient is edentulous   Dentures Yes/No 1=Changes: (ie. 1 broken area/tooth or dentures only worn for 1-2 hrs daily, or dentures not named, or loose) - does not wear often due to ill fitting. Has not worn in a while.    Oral Cleanliness 0=Healthy: clean and no food particles or tartar in mouth or dentures   Dental Pain 2=Unhealthy: (ie. there are physical pain signs (swelling or cheek or gum, broken teeth, ulcers), as well as verbal and/or behavioural signs (pulling at face, not eating, aggression)) - Ulcerations causing pain, difficulty eating, avoiding eating.    OHAT Total Score (0-16) 5   Other Dental Concerns Oral ulcers causing pain, preventing from eating.    Care provided during assessment Oral Assessment, Intraoral photo(s) Upper and lower gingiva, and Patient education   Follow up DH assessments required? Yes, DH will continue to follow up with pt during IP course of treatment   Connect with [facility] or SOD care? No.   Oral Care Plan Management of oral sores:    Order Magic Mouth Wash scheduled 2-3x/day, primarily prior to eating. Swish-and-spit with MMW and/or viscous lidocaine as ordered  Avoid: alcohol, sharp/crunchy foods, lemon-flavored glycerin swabs, spicy/acidic foods, sugary food/candy/beverages, tobacco  Eat soft, bland foods  Frequent rinses: warm salt and/or baking soda (1/4 tsp + 8oz water)  If phlegm/secretions present: Gargle with flat diet ginger ale, use humidifier to keep air moist, sleep at elevated angle (30 degrees) to allow mucous drainage    To optimize oral health during hospitalization and reduce risk of HAIs:  Lightly brush cheeks, tongue, avoiding the ulcers if possible.   Swish-and-spit with Biotene as needed to alleviate dry mouth and promote good oral hygiene.   Frequent application of Vaseline to lips    Pt should establish/continue outpatient comprehensive dental care at clinic of choice under medical advisement following discharge.  Does not wear denture due to ill-fitting. Advised to seek dental care out-patient to evaluate and adjust the denture as needed.             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 .  Dental and oropharyngeal history is pertinent for on blood thinners, hx of bacteremia, CKD, HTN.  The patient reports oral ulcers upper and lower alveolar ridges, impeding eating, causing a lot of pain.  .      Plan:   Order MMW, scheduled 2-3x/day, and before eating.   Implement oral care plan as described above. Dental hygiene will continue to follow and monitor patient for any  changes.   Pt to continue comprehensive dental care at dental clinic of choice. Information below for the [facility] [employer] [facility] if pt would like to use as a resource:  [facility] - [address] [place] [address], [phone]  Ph: [phone]    Marisela Payan, [facility], [facility]  Message on 2CRisk or pager *[phone]    Past Medical History   I have reviewed this patient's medical history and updated it with pertinent information if needed.  Past Medical History:   Diagnosis Date    Atrial fibrillation (H)     Depression     Hypertension     Malignant neoplasm of endocervix (H)     Tx with radiation    Near syncope 11/7/2024    Orthopnea 9/21/2024    Other chronic pain     Low back    Schizophrenia (H)     Urinary incontinence        Past Surgical History   I have reviewed this patient's surgical history and updated it with pertinent information if needed.  Past Surgical History:   Procedure Laterality Date    ABDOMINOPLASTY      BIOPSY CERVICAL, LOCAL EXCISION, SINGLE/MULTIPLE  10/26/2011    Procedure:BIOPSY CERVICAL, LOCAL EXCISION, SINGLE/MULTIPLE; EUA, cervical biopsies; Surgeon:BETTY TINEO; Location:[facility] OR    BIOPSY VAGINAL N/A 06/08/2021    Procedure: BIOPSY, VAGINA;  Surgeon: Dany Perez MD;  Location: [facility]    CYSTOSCOPY N/A 05/17/2024    Procedure: Cystoscopy;  Surgeon: Dany Perez MD;  [facility]: [facility]    CYSTOSTOMY, INSERT TUBE SUPRAPUBIC, COMBINED  07/12/2024    Procedure: Insertion of supra-pubic catheter;  Surgeon: Dany Perez MD;  [facility]: [facility]    DILATION AND CURETTAGE, WITH ULTRASOUND GUIDANCE N/A 05/17/2024    Procedure: Dilation and curettage of the uterus with drainage of uterine fluid under ultrasound guidance, lysis of vaginal adhesions;  Surgeon: Dany Perez MD;  [facility]: [facility]    EXAM UNDER ANESTHESIA PELVIC N/A 03/12/2020    Procedure: Exam under anesthsia, vaginal biopsies, possible CO2 laser of the vagina;  Surgeon: Lilliam Roy MD;  Location:  "[facility] OR    GI SURGERY  2004    gastric bypass    HYSTERECTOMY TOTAL ABDOMINAL, BILATERAL SALPINGO-OOPHORECTOMY, COMBINED Bilateral 07/12/2024    Procedure: Diagnostic laparoscopy converted to exploratory laparotomy, total abdominal hysterectomy, bilateral salpingo-oophorectomy, lysis of adhesions >60 min;  Surgeon: Dany Perez MD;  [facility]    INSERT PORT VASCULAR ACCESS N/A 08/26/2024    Procedure: Insert port vascular access;  Surgeon: Avery Gregory MD;  Location: [facility] OR    IR CHEST PORT PLACEMENT > 5 YRS OF AGE  08/26/2024    IR NEPHROSTOMY TUBE PLACEMENT LEFT  11/29/2024    LASER CO2 VAGINA N/A 03/02/2015    Procedure: LASER CO2 VAGINA;  Surgeon: Mariela Abdalla MD;  Location: [facility]    LASER CO2 VAGINA N/A 05/24/2019    Procedure: Exam under anesthesia, vaginal biopsies, CO2 laser of the vagina;  Surgeon: Lilliam Roy MD;  Location: [facility]    LASER CO2 VAGINA N/A 06/08/2021    Procedure: Exam under anesthesia, laser ablation of the upper vagina;  Surgeon: Dany Perez MD;  Location: [facility]    PICC DOUBLE LUMEN PLACEMENT Left 11/28/2024    left basilic 5 fr dl power picc 44 cm    REPAIR BLADDER N/A 07/12/2024    Procedure: Repair bladder;  Surgeon: Dany Perez MD;  Location: [facility]       Social History   I have reviewed this patient's social history and updated it with pertinent information if needed.  Social History     Tobacco Use    Smoking status: Never    Smokeless tobacco: Never   Substance Use Topics    Alcohol use: Not Currently    Drug use: No     Prior to Admission Medications   Prior to Admission Medications   Prescriptions Last Dose Informant Patient Reported? Taking?   Skin Protectants, Misc. (INTERDRY 10\"X36\") SHEE 2/4/2025 Self No Yes   Sig: Externally apply 10 each topically every 24 hours. Apply to skin folds on abdomen   acetaminophen (TYLENOL) 325 MG tablet 2/5/2025 Morning Self No Yes   Sig: Take 2 tablets (650 mg) by mouth every 6 hours as needed for mild " pain   albuterol (PROAIR HFA/PROVENTIL HFA/VENTOLIN HFA) 108 (90 Base) MCG/ACT inhaler Unknown Self Yes Yes   Sig: Inhale 1-2 puffs into the lungs every 4 hours as needed for shortness of breath   apixaban ANTICOAGULANT (ELIQUIS) 5 MG tablet 2/5/2025 Morning  No Yes   Sig: Take 1 tablet (5 mg) by mouth 2 times daily.   calcium Citrate-vitamin D 500-400 MG-UNIT CHEW 2/5/2025 Morning Self Yes Yes   Sig: Take 1 tablet by mouth daily   cyanocobalamin (CYANOCOBALAMIN) 1000 MCG/ML injection  Self Yes Yes   Sig: Inject 1 mL (1,000 mcg) into a muscle every month.   diclofenac (VOLTAREN) 1 % topical gel Unknown  No Yes   Sig: Apply 4 g topically 4 times daily as needed for moderate pain.   ferrous sulfate (CASSANDRA-IN-SOL) 75 (15 FE) MG/ML oral drops 2/5/2025 Morning Self Yes Yes   Sig: Take 15 mg by mouth daily   hydrocortisone 2.5 % cream Unknown Self Yes Yes   Sig: Apply topically as needed   lidocaine (XYLOCAINE) 5 % external ointment 2/4/2025 Self Yes Yes   Sig: Apply 1 Application topically as needed   melatonin 3 MG tablet 2/4/2025 Bedtime  No Yes   Sig: Take 1 tablet (3 mg) by mouth or Feeding Tube at bedtime.   naloxone (NARCAN) 4 MG/0.1ML nasal spray  Self No Yes   Sig: Spray 1 spray (4 mg) into one nostril alternating nostrils as needed for opioid reversal every 2-3 minutes until assistance arrives   ondansetron (ZOFRAN) 8 MG tablet Unknown Self No Yes   Sig: Take 1 tablet (8 mg) by mouth every 8 hours as needed for nausea   oxyCODONE IR (ROXICODONE) 15 MG tablet 2/4/2025 Evening  Yes Yes   Sig: Take 15 mg by mouth every 4 hours as needed (Chronic Pain).   phenol (CHLORASEPTIC) 1.4 % spray Unknown  No Yes   Sig: Take 1-2 sprays (1-2 mLs) by mouth every hour as needed for sore throat.   polyethylene glycol (MIRALAX) 17 GM/Dose powder 2/1/2025 Self No Yes   Sig: Take 17 g by mouth daily as needed for constipation   prochlorperazine (COMPAZINE) 5 MG tablet Unknown Self No Yes   Sig: Take 1-2 tablets (5-10 mg) by mouth  every 6 hours as needed for nausea or vomiting   senna-docusate (SENOKOT-S/PERICOLACE) 8.6-50 MG tablet Unknown Self No Yes   Sig: Take 1 tablet by mouth 2 times daily   triamcinolone (KENALOG) 0.1 % external ointment Unknown Self No Yes   Sig: Apply topically 3 times daily as needed for irritation.      Facility-Administered Medications: None     Allergies   Allergies   Allergen Reactions    Ibuprofen Nausea and Vomiting    Shrimp     Sulfa Antibiotics Rash     Patient started on bactrim 7/24/24 tolerating medication without rash on 7/25      Physical Exam

## 2025-02-11 NOTE — PROGRESS NOTES
[facility]    Medicine Progress Note - Hospitalist Service, GOLD TEAM 22    Date of Admission:  2/5/2025    Assessment & Plan   Ails Hartman is a 69 year old woman admitted on 2/5/2025. 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     Today   - Creatinine back to baseline, no albumin given today (last dose 2/10)  - Continued blood in depends; appears to be in vaginal canal per WOC assessment - GYN-ONC referral placed who will see patient today or tomorrow 2/12  - Repeat UA with significant blood  - Hgb down to 7.2; repeat in AM  - No change to systemic pain meds  - Magic mouthwash ordered per dental recs  - Appreciate SW/RNCC investigation for eventual TCU placement     # MADHU on CKD3 - improving   # hyponatremia, likely hypovolemic - resolved    Cr worsened on admission compared to prior, slowly worsening since 1/25. Overall studies suggest prerenal but iniitally didn't improve much with small IVF+ encouraging PO intake but this has been limited. History of  hydronephrosis related to her endometrial cancer s/p PNT removal, L just in December 2024. Cystatin C expectedly lower, GFR estimated to be <30.   Hyponatremia has been worsening in the last few weeks in the setting of poor PO intake, now likely prerenal MADHU, poor nutrition. Renal US negative for hydronephrosis. Cr peaked at 1.44, now Cr and electrolytes improving on IV albumin, now almost back to prior baseline.  Suspect prerenal in the setting of low oncotic pressure; responsive to albumin now back to baseline.  -Creatinine back to  baseline, last albumin was 2/10  - renal consulted, appreciate recs, have since signed off but should follow up outpatient given CKD     # Chronic bilateral lower extremity edema  # Deconditioning  # Sacral pressure ulcer, POA, worsening   # Severe malnutrition in the context of chronic illness   Hx of chronic edema, no PTA diuretics. Echo 11/24 reassuring EF but poor study, cant comment on diastolic function, edema likely due to some degree of proteinuria and low oncotic pressure with low albumin/malnutrition.  Last hospitalization recommendation was to transitional care unit last hospitalization, however per patient preference and for her mental health discharged to home with home care and family support in place. Unfortunately symptoms continued to progress at home.  Improved swelling with therapies as below  - Followed now by lymphedema, WOC RN, PT,  RD   - social work consulted re:placement/discharge planning   - continue dilaudid PO, ok to give occasional addition if needed     #Hx of ESBL urosepsis and bacteremia  #Bladder dysfunction s/p cystostomy and suprapubic catheter  Recently admitted 11/26 - 12/8/2024 for ESBL urosepsis and bacteremia requiring ICU w/ L nephrostomy tube (removed 1/7) treated w/ ertapenem, returned 1/25-1/27 for concern for UTI but no clear infection at that time.  ID was consulted that admission and recommended avoiding frequent UA and Ucx checks in future unless patient w/ symptoms of UTI.   - UA/Ucx abnormal at admission, likely colonization, will continue to monitor off antibiotics   - may be overdue for suprapubic cath exchange     #Serous endometrial carcinoma  Follows w/ heme/onc through [place]. S/p SNEHA, BSO 07/2024 s/p cycle 3 carbo/taxel 10/15/2024, cycle 4 delayed in s/o recent admission, family ultimately decided to forgo any further treatment.      #Chronic anemia, stable  #? Vaginal vs ureteral bleeding  #Gross Hematuria  Likely multifactorial 2/2 malignancy, renal  disease, nutrition. Likely hemoconcentrated at admission, down to 7.5 and recent baseline appears near this.  Continues to have bleeding on 12/7 with hemoglobin down to 7.2.  UA with large blood but patient has bright red blood in opening of vagina on exam per [facility] nurse.  Therefore, suspect vaginal source.    - Continued blood in depends; appears to be in vaginal canal per [facility] assessment - GYN-ONC referral placed who will see patient today or tomorrow 2/12  - Hgb daily; will assess transfusion need based on clinical context  - Will discuss with urology pending gyn-onc assessment        Diet: Combination Diet Regular Diet Adult  Snacks/Supplements Adult: Other; Send chocolate Ensure Enlive w/ breakfast, strawberry Ensure+HP with dinner; With Meals  Fluid restriction 2000 ML FLUID    DVT Prophylaxis: DOAC  Shahid Catheter: Not present  Lines: PRESENT      Port a Cath 02/05/25 Single Lumen Right Chest wall-Site Assessment: WDL      Cardiac Monitoring: None  Code Status: Full Code      Clinically Significant Risk Factors               # Hypoalbuminemia: Lowest albumin = 1.9 g/dL at 2/5/2025  2:52 PM, will monitor as appropriate     # Hypertension: Noted on problem list             # Severe Malnutrition: based on nutrition assessment    # Financial/Environmental Concerns: unable to afford rent/mortgage (pt interested in resources/info on getting assistance paying rent, as well as applying for county benefits)         Social Drivers of Health            Disposition Plan     Medically Ready for Discharge: Anticipated in 2-4 Days         Mukesh Bermudez DO, MPH  Internal Medicine-Pediatrics Hospitalist  Hospitalist Service, GOLD TEAM 22  [facility]  Securely message with [employer] (more info)  Text page via [facility] Paging/Directory   See signed in provider for up to date coverage information  ______________________________________________________________________    Interval History    Patient seen at bedside this morning.  She reports feeling slightly better from yesterday.  Desires to go home.  No family at bedside currently.  Reports lower extremity swelling is much improved.  Having pain with dressing changes.      Physical Exam   Vital Signs: Temp: 98.7  F (37.1  C) Temp src: Oral BP: 104/78 Pulse: 102   Resp: 18 SpO2: 94 % O2 Device: None (Room air)    Weight: 136 lbs 10.96 oz    Gen: alert, interactive and pleasant, lying in bed, chronically ill appearing, appears comfortable but easily becomes tearful   HEENT: Normocephalic/atraumatic, sclera clear, edentulous, oropharynx with mildly dry mucous membranes, stable ulcer on upper and lower middle gums  Resp: Clear bilaterally anteriorly, easy work of breathing on room air, no wheezing, shallow breathing   CV: Regular rate and rhythm, no murmurs noted    Abd: soft, diffusely tender, nondistended. Suprapubic cath in place without bleeding noted   : some bright red blood on the depends  Ext: distal LE edema 2+ pitting in thighs   Neuro: Alert and oriented x4, grossly non-focal, generalized weakness but moving all extremities equally    Medical Decision Making       Data     I have personally reviewed the following data over the past 24 hrs:    6.5  \   7.2 (L)   / 176     139 105 19.2 /  83   4.0 24 0.72 \

## 2025-02-11 NOTE — CONSULTS
SPIRITUAL HEALTH SERVICES Consult Note  I met with Aranza this afternoon. She was resting when I arrived and said that she wanted to continue resting and asked me to return at another time.     I will return tomorrow to see if she is more up for a visit.       Bernabe Preston M.Div.  Associate

## 2025-02-11 NOTE — PROGRESS NOTES
Care Management Follow Up    Length of Stay (days): 6    Expected Discharge Date: 02/12/2025     Concerns to be Addressed: discharge planning     Patient plan of care discussed at interdisciplinary rounds: Yes    Anticipated Discharge Disposition:  (TCU vs Home Care (pt has preference of home))     Anticipated Discharge Services:  (if discharging home, [facility] home care RN/PT/OT)  Anticipated Discharge DME:  (No new DME anticipated)    Patient/family educated on Medicare website which has current facility and service quality ratings: yes  Education Provided on the Discharge Plan: Other (see comments) (plan in progress)  Patient/Family in Agreement with the Plan: yes    Referrals Placed by CM/SW:  (None currently)  Private pay costs discussed: Not applicable    Discussed  Partnership in Safe Discharge Planning  document with patient/family: No     Handoff Completed: No, handoff not indicated or clinically appropriate    Additional Information:    SW met with patient at bedside, PT and OT present. Discussed recommendation of TCU. Patient appeared emotional and was not engaging in the conversation. Patient stated that she will call her daughter. SW to check back later today.    SW met with patient at bedside. She stated that she has not called her daughter but she does not want to go to TCU. She reports that she has home care to help her and that someone is home 24/7. She states that her daughter, grandson, and granddaughter live with her and can help her as needed.    Next Steps:     Discuss with provider about patient's desire to go against recommendations, discuss whether there is concern about patient's decision making capacity    If discharging to home:  1) Resume home care services and ensure home care nursing is able to provide wound care/teach family wound care   2) Discuss with therapies/IDT what DME is needed          Mari Hernandez, KATHEW, LSW  8 Med Surg   [facility]  [place]  Phone: [phone]  Vocera: Searchable by name or group: 8 Med Surg Vocera

## 2025-02-11 NOTE — PLAN OF CARE
"Goal Outcome Evaluation: [id number]      Plan of Care Reviewed With: patient    Overall Patient Progress: no changeOverall Patient Progress: no change     Pt awake and calling a lot during shift change. Pt checked and she noted that she was hungry and wanted apple juice. Quick repositioning completed. A turkey sandwich was given to her, and juice, but \"per previous reports\" does not usually eat the items and would just throw the food away. She took a bite and threw the food away. Fluids encouraged, protein drink encouraged, intake and output monitored. Shahid draining adequately. Urine red. Sips of water with medications.     Pt needing repositioning q2hrs and upon request. Pt calls staff frequently, RN and aide talked to patient about completing repositionings as ordered and completing check ins on the hour to address extra needs. Pt was agreeable even though she was happy. A lot of crying. Pain managed by PO dilaudid q3hrs.     RN noted a lot of open wounds, all mepilexes changed and added on new ones on open areas noted. Pictures taken, provider paged for WOC consult. Noted vaginal bleeding, provider paged.     BM this morning.  Continue with POC.       "

## 2025-02-11 NOTE — PLAN OF CARE
A/Ox's 4 and tearful. Pt rated pain as tolerable. Dilaudid and Tylenol given for pain control. Pt has several Mepilex dressings intact. CMS to baseline.  Denied any nausea, CP, SOB, lightheadedness or dizziness. Pt has a Suprapubic cath. Turned in bed with a assist of two and encouragement.  Resting in bed at this time with call light in reach. Able to make needs known.

## 2025-02-11 NOTE — PLAN OF CARE
0545: Patient called wanting to use bedpan.     0550: Helping patient on bedpan. Patient immediately wanted writer to take it out and didn't have go anymore. Repositioned patient. Patient began crying.     0555:Patient called, writer answered patient's call.     0602: Patient called again. Writer answered patient's call. Patient continued to cry.     0607: Patient called again. Writer stated they answered 4 of her calls in the last 22 minutes and she will have to wait until someone is available. Patient crying/yelling.

## 2025-02-11 NOTE — CONSULTS
[facility] Nurse Inpatient Assessment     Consulted for: coccyx, NEW consult 2/11 for groin and bilateral lower legs    Summary: Site of previously healed pressure injury, seen on last admit 1/27/25, now with 3 open areas.    2/11: Sacral wound is stable. Patient with skin tears on bilateral lower legs and arms from edema. Consult for groin wounds however bleeding noted to be vaginal, not from a wound in the groin.      [facility] nurse follow-up plan: weekly    Patient History (according to provider note(s):      Alis Hartman is a 69 year old woman admitted on 2/5/2025. She has a history of paranoid schizophrenia, lower extremity edema, cervical cancer, ESBL UTI, atrial fibrillation, gastric bypass and CKD who is admitted from home with worsening BLE edema and new coccyx sores.  The patient was just in the hospital from 1/25-27 with hematuria and pyuria.  She had her catheter exchanged and while PT/OT recommend TCU she elected to return home.     Assessment:      Areas visualized during today's visit: Sacrum/coccyx    Pressure Injury Location: Sacrum/coccyx    Last photo: 2/6/25  Wound type: Pressure Injury and Shear     Pressure Injury Stage: site of previously healed pressure injury, present on admission     Wound history/plan of care:   unknown worst stage, currently appears to be a stage 2 but was possibly deeper. Was noted as a reinjury on her last assessment here 12/4/24 and 1/27/25  Wound base: 100 % Dermis     Palpation of the wound bed: normal      Drainage: small     Description of drainage: serosanguinous     Measurements (length x width x depth, in cm) 0.5  x 0.2  x  0.1 cm, 0.4 x 0.2 x 0.1 cm and 0.4 x 0.3 x 0.1 cm   Periwound skin: Intact and Macerated      Color: normal and consistent with surrounding tissue      Temperature: normal   Odor: none  Pain: moderate, tender  Pain intervention prior to dressing change: slow and gentle cares   Treatment  goal: Heal   STATUS: initial assessment  Supplies ordered: discussed with RN and discussed with patient     Wound Location: Bilateral lower legs and arms     2/11 Left anterior lower leg                          2/11 Left anterior lower leg       2/11 Right anterior lower leg    Last photo: 2/11/2025  Wound due to: Skin Tear vs blistering  Wound history/plan of care: Patient with blistering from edema which have unroofed  Wound base: mix of slough and dermis     Palpation of the wound bed: normal      Drainage: scant     Description of drainage: serous     Measurements (length x width x depth, in cm): see photos     Tunneling: N/A     Undermining: N/A  Periwound skin: Intact      Color: pink      Temperature: normal   Odor: none  Pain: moderate, aching  Pain interventions prior to dressing change: slow and gentle cares   Treatment goal: Protection  STATUS: initial assessment  Supplies ordered: gathered    Treatment Plan:     Sacrum  Q shift, Every 3 days and as needed  Peel back dressing Q shift for evaluation, reapply or change as needed   Cleanse the area with NS and pat dry.  Apply No sting film barrier to periwound skin.  Dust open areas with ostomy powder and pat into place.   Cover wound with Sacral Mepilex (#[id number])  Turn and reposition Q 2hrs side to side only.  Ensure pt has Tanner-cushion while sitting up in the chair.  Ensure isoflex pump on bed and set  FYI- If pt has constant incontinent loose stools needing dressing changes Q shift please discontinue the Mepilex dressing and apply criticaid barrier paste BID and PRN.      Bilateral lower legs and arm wounds: Every other day: remove dressings and wash wounds with Vashe and gauze. Pat dry. Cover open areas with Vaseline gauze and secure with large Mepilex or ABD and stretch netting. If using Mepilex, note patient has very delicate skin so care should be taken to prevent worsening skin tears when removing.     Orders: Written    RECOMMEND PRIMARY TEAM ORDER:  None, at this time  Education provided: importance of repositioning, plan of care, and wound progress  Discussed plan of care with: Patient and Nurse  Notify [facility] if wound(s) deteriorate.  Nursing to notify the Provider(s) and re-consult the [facility] Nurse if new skin concern.    DATA:     Current support surface: Standard  Standard gel mattress (Isoflex)  Containment of urine/stool: Incontinence Protocol  BMI: Body mass index is 24.21 kg/m .   Active diet order: Orders Placed This Encounter      Combination Diet Regular Diet Adult     Output: I/O last 3 completed shifts:  In: 100 [P.O.:100]  Out: 300 [Urine:300]     Labs:   Recent Labs   Lab 02/11/25  0653 02/09/25  1035 02/08/25  0928 02/07/25  0612 02/05/25  1452   ALBUMIN  --   --  2.6*  --  1.9*   HGB 7.2*   < >  --    < > 10.0*   INR  --   --   --   --  1.24*   WBC 6.5   < >  --    < > 9.9    < > = values in this interval not displayed.     Pressure injury risk assessment:   Sensory Perception: 3-->slightly limited  Moisture: 3-->occasionally moist  Activity: 2-->chairfast  Mobility: 3-->slightly limited  Nutrition: 2-->probably inadequate  Friction and Shear: 1-->problem  Yogi Score: 14    Kerry Holbrook RN CWOCN  Pager no longer is use, please contact through Radha Garcia group: [facility] Nurse [facility]  Dept. Office Number: [phone]       09-Oct-2023 22:20

## 2025-02-11 NOTE — CONSULTS
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 Gyn onc team consulted for scant vaginal bleeding.       She reports she was at home and was supposed to start working with PT/OT and HHC when she hit her leg on the commode and a large boil was formed. She had severe pain with this so asked her daughter to bring her to the hospital. On presentation she was noted to have worsening sacral ulcers and multiple right lower extremity ulcers. She denies any vaginal bleeding at home but nurses and WOC providers noted vaginal bleeding, this was initially thought to be coming from her sacral wounds. She does have hematuria intermittently as well. She is currently on Eliquis for A.Fib.          Cancer Treatment History:     Oncology History   Cervical cancer (H)   3/1996 Initial Diagnosis    Cervical cancer    Moderately differentiated squamous cell carcinoma. She was treated with primary radiation therapy, unsure if she also had chemotherapy or not.  This treatment was at [facility] [place].     12/22/2014 Procedure    Exam under anesthesia with LASER to the vagina for VAIN 3     5/24/2019 Procedure    12/27/18:   Pap:  HSIL, HPV+  5/24/19: PROCEDURES: Vaginal colposcopy, vaginal biopsy, CO2 laser of the vagina  VAGINAL BIOPSY:   - High grade squamous intraepithelial lesion (vaginal intraepithelial neoplasia 3)      3/12/2020 Procedure    10/14/19: Pap HSIL/other HPV+  2/28/2020: biopsy of vagina Vain III; MRI recommended prior to OR; however, patient did not complete this  3/12/2020: EUA, biopsies, LASER: VAIN III     6/8/2021 Procedure    6/8/2021: Exam under anesthesia, vaginal biopsies, laser ablation of the upper vagina with Dr. Perez, biopsies returned showing necrosis, no dysplasia. Hyperbaric oxygen therapy discussed and referral place. Patient was unable to pursue this due to need for transportation to the [facility].     4/6/2023 Imaging    4/6/2023 CT Urogram: IMPRESSION:  1.  Negative CT urogram. No urinary calculi, solid renal mass, or evidence of upper tract urothelial malignancy.   2.  Enlarged uterus, with marked distention of the endometrial canal by intermediate density material, possibly a mix of fluid and blood products. This may be related to cervical stenosis given the history of prior pelvic radiation. Uterine enlargement may contribute to urinary frequency/urgency.   3.  Indeterminate left pelvic/perirectal mass with rim calcifications measuring up to 4.5 cm. Differentials include an old hematoma or an atypical enlarged lymph node. Consider pelvic MRI with contrast and subtraction imaging for further evaluation. The uterus and endometrium can also be further evaluated at that time.        Serous adenocarcinoma of uterus (H)    Initial Diagnosis    Uterine serous carcinoma     5/3/2024 Imaging    CT and ultrasound showing distended endometrial canal. No evidence of metastatic disease     5/17/2024 Surgery    D&C of the uterus with drainage of the uterine fluid under US guidance, lysis of vaginal adhesions, cystoscopy     5/17/2024 Pathology    Endometrial curettings: Endometrial serous carcinoma with  extensive necrosis     7/12/2024 Surgery    Diagnostic laparoscopy converted to exploratory laparotomy, total abdominal hysterectomy, bilateral salpingo-oophorectomy, lysis of adhesions, bladder repair and insertion of suprapubic catheter by Dr. Monroy     7/12/2024 Pathology    Final pathology with uterine serous carcinoma, MMR-intact/a00-ipozhkhc/HER2 negative       7/12/2024 -  Cancer Staged    T2NxM0 uterine serous carcinoma, omentum negative, washings negative, lymph nodes omitted due to radiation history and high grade histology     8/8/2024 - 10/15/2024 Chemotherapy    8/8/24: Cycle 1 Carboplatin AUC 5, paclitaxel 175mg/ m2  8/30/24: Cycle 2 Carboplatin AUC 5, paclitaxel 175mg/m2  10/15/24: Cycle 3 Carboplatin AUC 5, paclitaxel 135mg/m2, reduced due to recent sepsis  Discontinued after 3 cycles due to recurrent admissions     9/20/2024 - 9/27/2024 Hospital Admission    Admission for sepsis secondary to urinary tract infection     11/7/2024 - 11/10/2024 Hospital Admission    ED to hospital admission for weakness, near syncope, and dyspnea.      11/26/2024 - 12/8/2024 Hospital Admission    ED to hospital admission for sepsis of urinary source required ICU admission. Concerns for ureteral obstruction and left PNT placement. Severe malnutrition and anasarca leading to NGT placement for tube feeds. Discharged to [facility] nursing facility.     12/8/2024 Recommended Treatment    Discontinuation of remainder of planned adjuvant therapy given life threatening complications with multiple admissions. Plan for surveillance.               Past Medical History:     Past Medical History:   Diagnosis Date    Atrial fibrillation (H)     Depression     Hypertension     Malignant neoplasm of endocervix (H)     Tx with radiation    Near syncope 11/7/2024    Orthopnea 9/21/2024    Other chronic pain     Low back    Schizophrenia (H)     Urinary incontinence             Past Surgical History:      Past Surgical History:   Procedure  Laterality Date    ABDOMINOPLASTY      BIOPSY CERVICAL, LOCAL EXCISION, SINGLE/MULTIPLE  10/26/2011    Procedure:BIOPSY CERVICAL, LOCAL EXCISION, SINGLE/MULTIPLE; EUA, cervical biopsies; Surgeon:BETTY TINEO; Location:[facility] OR    BIOPSY VAGINAL N/A 06/08/2021    Procedure: BIOPSY, VAGINA;  Surgeon: Dany Perez MD;  Location: [facility] OR    CYSTOSCOPY N/A 05/17/2024    Procedure: Cystoscopy;  Surgeon: Dany Perez MD;  Location: [facility]    CYSTOSTOMY, INSERT TUBE SUPRAPUBIC, COMBINED  07/12/2024    Procedure: Insertion of supra-pubic catheter;  Surgeon: Dany Perez MD;  [facility]: [facility]    DILATION AND CURETTAGE, WITH ULTRASOUND GUIDANCE N/A 05/17/2024    Procedure: Dilation and curettage of the uterus with drainage of uterine fluid under ultrasound guidance, lysis of vaginal adhesions;  Surgeon: Dany Perez MD;  Location: [facility]    EXAM UNDER ANESTHESIA PELVIC N/A 03/12/2020    Procedure: Exam under anesthsia, vaginal biopsies, possible CO2 laser of the vagina;  Surgeon: Lilliam Roy MD;  [facility]: [facility]    GI SURGERY  2004    gastric bypass    HYSTERECTOMY TOTAL ABDOMINAL, BILATERAL SALPINGO-OOPHORECTOMY, COMBINED Bilateral 07/12/2024    Procedure: Diagnostic laparoscopy converted to exploratory laparotomy, total abdominal hysterectomy, bilateral salpingo-oophorectomy, lysis of adhesions >60 min;  Surgeon: Dany Perez MD;  Location: [facility]    INSERT PORT VASCULAR ACCESS N/A 08/26/2024    Procedure: Insert port vascular access;  Surgeon: Avery Gregory MD;  Location: [facility]    IR CHEST PORT PLACEMENT > 5 YRS OF AGE  08/26/2024    IR NEPHROSTOMY TUBE PLACEMENT LEFT  11/29/2024    LASER CO2 VAGINA N/A 03/02/2015    Procedure: LASER CO2 VAGINA;  Surgeon: Mariela Abdalla MD;  Location: [facility] OR    LASER CO2 VAGINA N/A 05/24/2019    Procedure: Exam under anesthesia, vaginal biopsies, CO2 laser of the vagina;  Surgeon: Lilliam Roy MD;  Location: [facility] OR    LASER CO2 VAGINA N/A  06/08/2021    Procedure: Exam under anesthesia, laser ablation of the upper vagina;  Surgeon: Dany Perez MD;  Location: [facility] OR    PICC DOUBLE LUMEN PLACEMENT Left 11/28/2024    left basilic 5 fr dl power picc 44 cm    REPAIR BLADDER N/A 07/12/2024    Procedure: Repair bladder;  Surgeon: Dany Perez MD;  Location: [facility] OR            Social History:     Social History     Tobacco Use    Smoking status: Never    Smokeless tobacco: Never   Substance Use Topics    Alcohol use: Not Currently            Family History:     Family History   Problem Relation Age of Onset    Heart Disease Father     Heart Failure Father     No Known Problems Brother     No Known Problems Brother     No Known Problems Brother     Pancreatitis Brother     Hypertension Sister     Peripheral Vascular Disease Sister     No Known Problems Sister     No Known Problems Son     No Known Problems Daughter             Immunizations:     Immunization History   Administered Date(s) Administered    COVID-19 12+ (Pfizer) 11/09/2023    COVID-19 Bivalent 12+ (Pfizer) 09/22/2022    COVID-19 MONOVALENT 12+ (Pfizer) 04/19/2021, 05/10/2021, 12/03/2021    COVID-19 Monovalent 12+ (Pfizer 2022) 07/07/2022    DTAP (<7y) 08/05/2008    Hepatitis A (ADULT 19+) 08/05/2008    Hepatitis B, Adult 05/11/2007, 08/05/2008    Influenza (H1N1) 01/14/2010    Influenza (High Dose) Trivalent,PF (Fluzone) 11/07/2019, 10/27/2020, 10/15/2021, 09/22/2022    Influenza (IIV3) PF 10/24/2008, 10/05/2011    Influenza (prior to 2024) 10/27/2008, 01/14/2010    Influenza Vaccine 65+ (Fluzone HD) 10/15/2021, 09/28/2023    Influenza Vaccine >6 months,quad, PF 09/17/2015, 11/02/2016, 08/23/2017, 10/01/2018    Pneumo Conj 13-V (2010&after) 03/29/2019    Pneumococcal 20 valent Conjugate (Prevnar 20) 05/02/2024    Pneumococcal 23 valent 12/17/2008    TDAP (Adacel,Boostrix) 08/05/2008, 11/10/2022    Zoster recombinant adjuvanted (SHINGRIX) 11/05/2020, 10/15/2021            Allergies:      Allergies   Allergen Reactions    Ibuprofen Nausea and Vomiting    Shrimp     Sulfa Antibiotics Rash     Patient started on bactrim 7/24/24 tolerating medication without rash on 7/25             Medications:     Current Facility-Administered Medications   Medication Dose Route Frequency Provider Last Rate Last Admin    acetaminophen (TYLENOL) tablet 650 mg  650 mg Oral Q4H PRN Jose Nunez APRN CNP   650 mg at 02/07/25 2141    Or    acetaminophen (TYLENOL) Suppository 650 mg  650 mg Rectal Q4H PRN Jose Nunez APRN CNP        acetaminophen (TYLENOL) tablet 650 mg  650 mg Oral 4x Daily Tashia Brantley MD   650 mg at 02/11/25 0827    apixaban ANTICOAGULANT (ELIQUIS) tablet 5 mg  5 mg Oral BID Jose Nunez APRN CNP   5 mg at 02/11/25 0827    bisacodyl (DULCOLAX) EC tablet 5 mg  5 mg Oral Daily PRN Jose Nunez APRN CNP        Or    bisacodyl (DULCOLAX) EC tablet 10 mg  10 mg Oral Daily PRN Jose Nunez APRN CNP        bisacodyl (DULCOLAX) suppository 10 mg  10 mg Rectal Daily PRN Jose Nunez APRN CNP        calcium carbonate (TUMS) chewable tablet 1,000 mg  1,000 mg Oral 4x Daily PRN Jose Nunez APRN CNP        ferrous sulfate (CASSANDRA-IN-SOL) oral drops 15 mg  15 mg Oral Daily Tashia Brantley MD   15 mg at 02/11/25 0835    heparin lock flush 10 unit/mL injection 5-10 mL  5-10 mL Intracatheter Q1H PRN Tashia Brantley MD   5 mL at 02/11/25 0650    heparin lock flush 10 unit/mL injection 5-10 mL  5-10 mL Intracatheter Q24H Tashia Brantley MD   5 mL at 02/10/25 1204    heparin lock flush 100 unit/mL injection 5-10 mL  5-10 mL Intracatheter Q28 Days Tashia Brantley MD        HYDROmorphone (DILAUDID) tablet 2 mg  2 mg Oral Q3H Tashia Brantley MD   2 mg at 02/11/25 0917    HYDROmorphone (PF) (DILAUDID) injection 0.3 mg  0.3 mg Intravenous Q4H PRN Tashia Brantley MD   0.3 mg at 02/11/25 0828    lidocaine (LMX4) cream   Topical Q1H PRN Mayra,  ERINN Adair CNP        lidocaine 1 % 0.1-1 mL  0.1-1 mL Other Q1H PRN Jose Nunez APRN CNP        melatonin tablet 1 mg  1 mg Oral At Bedtime PRN Jose Nunez APRN CNP   1 mg at 02/10/25 1759    multivitamin w/minerals (THERA-VIT-M) tablet 1 tablet  1 tablet Oral Daily Tashia Brantley MD   1 tablet at 02/11/25 0827    naloxone (NARCAN) injection 0.2 mg  0.2 mg Intravenous Q2 Min PRN Waibel, Niharika, RPH        Or    naloxone (NARCAN) injection 0.4 mg  0.4 mg Intravenous Q2 Min PRN Waibel, Niharika, RPH        Or    naloxone (NARCAN) injection 0.2 mg  0.2 mg Intramuscular Q2 Min PRN Waibel, Niharika, RPH        Or    naloxone (NARCAN) injection 0.4 mg  0.4 mg Intramuscular Q2 Min PRN Waibel, Niharika, RPH        ondansetron (ZOFRAN ODT) ODT tab 4 mg  4 mg Oral Q6H PRN Jose Nunez APRN CNP   4 mg at 02/06/25 1507    Or    ondansetron (ZOFRAN) injection 4 mg  4 mg Intravenous Q6H PRN Jose Nunez APRN CNP        Patient is already receiving anticoagulation with heparin, enoxaparin (LOVENOX), warfarin (COUMADIN)  or other anticoagulant medication   Does not apply Continuous PRN Jose Nunez APRN CNP        polyethylene glycol (MIRALAX) Packet 17 g  17 g Oral BID PRN Jose Nunez APRN CNP   17 g at 02/10/25 1433    senna-docusate (SENOKOT-S/PERICOLACE) 8.6-50 MG per tablet 1 tablet  1 tablet Oral BID Tashia Brantley MD   1 tablet at 02/11/25 0827    sodium chloride (PF) 0.9% PF flush 10-20 mL  10-20 mL Intracatheter q1 min prn Tashia Brantley MD        sodium chloride (PF) 0.9% PF flush 10-20 mL  10-20 mL Intracatheter Q1H PRN Tashia Brantley MD        sodium chloride (PF) 0.9% PF flush 10-20 mL  10-20 mL Intracatheter Q28 Days Tashia Brantley MD        sodium chloride (PF) 0.9% PF flush 3 mL  3 mL Intracatheter Q8H Jose Nunez APRN CNP   3 mL at 02/11/25 0649    sodium chloride (PF) 0.9% PF flush 3 mL  3 mL Intracatheter q1 min prkaren Nunez  ERINN Adair CNP   3 mL at 02/06/25 0042    sodium chloride (PF) 0.9% PF flush 3 mL  3 mL Intracatheter Q8H MayraJose APRN CNP   3 mL at 02/11/25 0301    sodium chloride (PF) 0.9% PF flush 3 mL  3 mL Intracatheter q1 min prn MayraJose APRN CNP         Facility-Administered Medications Ordered in Other Encounters   Medication Dose Route Frequency Provider Last Rate Last Admin    heparin lock flush 100 unit/mL injection 500 Units  500 Units Intracatheter Once Dany Perez MD        sodium chloride (PF) 0.9% PF flush 10 mL  10 mL Intracatheter Once Dany Perez MD                Physical Exam:     Vitals:    02/10/25 1215 02/10/25 1220 02/10/25 2152 02/11/25 0805   BP: 93/66 104/70 111/62 97/65   BP Location: Left arm Left arm Left arm Left arm   Patient Position: Right side      Cuff Size: Adult Regular      Pulse:   92 82   Resp:   16 16   Temp:  98.3  F (36.8  C) 98.6  F (37  C) 97.2  F (36.2  C)   TempSrc:  Oral Oral Oral   SpO2: 100% 100% 99% 99%   Weight:       Height:           General: Chronically ill appearing, NAD.  Eyes: Conjunctivae and lids normal.   Neck: Supple. No masses. Trachea midline.  CV: HR regular.  Resp: Non-labored breathing on RA  GI: Soft, non-distended, non-tender to palpation  :Dried blood noted on labia and bilateral thighs. Atrophic vagina with agglutination of vaginal walls. Blood note on finger with exam. Possible nodularity noted on right vaginal apex.   Extremities: + edema with multiple lesions covered with bandages  Neuro/MSK: Alert and oriented x 3.   Psychiatric: Appropriate mood and affect. Mentation appears normal         Data:     Results for orders placed or performed during the hospital encounter of 02/05/25 (from the past 24 hours)   Basic metabolic panel   Result Value Ref Range    Sodium 139 135 - 145 mmol/L    Potassium 4.0 3.4 - 5.3 mmol/L    Chloride 105 98 - 107 mmol/L    Carbon Dioxide (CO2) 24 22 - 29 mmol/L    Anion Gap 10  7 - 15 mmol/L    Urea Nitrogen 19.2 8.0 - 23.0 mg/dL    Creatinine 0.72 0.51 - 0.95 mg/dL    GFR Estimate 89 >60 mL/min/1.73m2    Calcium 9.1 8.8 - 10.4 mg/dL    Glucose 83 70 - 99 mg/dL   CBC with platelets   Result Value Ref Range    WBC Count 6.5 4.0 - 11.0 10e3/uL    RBC Count 2.49 (L) 3.80 - 5.20 10e6/uL    Hemoglobin 7.2 (L) 11.7 - 15.7 g/dL    Hematocrit 21.4 (L) 35.0 - 47.0 %    MCV 86 78 - 100 fL    MCH 28.9 26.5 - 33.0 pg    MCHC 33.6 31.5 - 36.5 g/dL    RDW 19.2 (H) 10.0 - 15.0 %    Platelet Count 176 150 - 450 10e3/uL             Assessment and Plan:   Assessment/Plan: 69 year old with PMH notable for schizophrenia (off medications), h/o leandra-en-Y gastric bypass, cervical cancer treated with radiation therapy in 1996, 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 Gyn onc team consulted for scant vaginal bleeding.      # Serous endometrial adenocarcinoma of uterus  # Cervical cancer treated with radiation therapy in 1996  # Vaginal Bleeding  - S/p SNEHA, BSO (7/12/2024), cystotomy w/ suprapubic catheter, s/p Cycle 3 carbo/taxol (10/15/24). Chemo discontinued due to tolerability.  - Vaginal bleeding present on digital exam with minimal tolerability from patient   - No overt fungating mass or vaginal abnormality noted, possible nodularity in right vaginal apex vs agglutination   - Recommend CT A/P with IV contrast to assess for disease recurrence for prognostication discussions given new onset vaginal bleeding. Likely etiology is atrophic vagina but she has a high risk for recurrence with this histology.  - Given patient is only  on anticoagulation for A. Fib could consider holding indefinitely given persistent anemia and vaginal bleeding. Will defer to primary team.   - This patient is well known to our service but with significant functional decline this admission, worsening sacral wounds and new LE ulcerated lesions. Patient daughter involved in her care but frequent re-admissions. Recommend continued social work involvement for safe discharge planning.     Discussed with Gyn Oncology staff - Dr. Lona Avina MD  PGY7 Gyn Oncology Fellow   February 11, 2025  4:53 PM

## 2025-02-11 NOTE — PLAN OF CARE
"Goal Outcome Evaluation:      Plan of Care Reviewed With: patient    Overall Patient Progress: no changeOverall Patient Progress: no change            Patient admitted 2/5/25 [facility], hyponatremia, worsening BLE edema and progressive coccyx sores.     VS: BP 97/65 (BP Location: Left arm)   Pulse 82   Temp 97.2  F (36.2  C) (Oral)   Resp 16   Ht 1.6 m (5' 3\")   Wt 62 kg (136 lb 11 oz)   SpO2 99%   BMI 24.21 kg/m       O2: >90% on RA no complaints of SOB or chest pain.   Output: Supra pubic catheter in place with hematuria. Bleeding from vagina.    Last BM: 2/11/25   Activity: A2 with repositioning. Pt not OOB. Education and encouragement with repositioning - pt refuses at times    Skin: Wound to coccyx, groin and scattered open wounds to BLE. Severe edema to BLE - lymphedema wraps removed yesterday per order for 1 hour but pt refused writer to put back on. MD aware. WOC consulted this shift   Pain: 10/10 pain coccyx   CMS: AOX4, numbness and tingling to BLE   Dressing: Mepilex scattered to BLE - Changed this AM due 2/13/25 next  Mepilex to bilateral elbows - Changed this AM   Mepilex to coccyx - Changed this shift due 2/14/25 next   Diet: Regular diet, low appetite.   LDA: R chest Port - dressing change due date 2/12/25   Equipment: Personal belongings   Plan: Awaiting TCU placement or Family may take pt home.   Additional Info:   Sores to gums - dental consulted patient today magic mouth wash ordered prior to meals.    Gyn/oncology consulted- CT recommended for vaginal bleeding.              "

## 2025-02-12 LAB
ANION GAP SERPL CALCULATED.3IONS-SCNC: 10 MMOL/L (ref 7–15)
BACTERIA UR CULT: ABNORMAL
BUN SERPL-MCNC: 22.7 MG/DL (ref 8–23)
CALCIUM SERPL-MCNC: 8.9 MG/DL (ref 8.8–10.4)
CHLORIDE SERPL-SCNC: 104 MMOL/L (ref 98–107)
CREAT SERPL-MCNC: 0.78 MG/DL (ref 0.51–0.95)
EGFRCR SERPLBLD CKD-EPI 2021: 81 ML/MIN/1.73M2
ERYTHROCYTE [DISTWIDTH] IN BLOOD BY AUTOMATED COUNT: 19.7 % (ref 10–15)
GLUCOSE SERPL-MCNC: 86 MG/DL (ref 70–99)
HCO3 SERPL-SCNC: 23 MMOL/L (ref 22–29)
HCT VFR BLD AUTO: 20.2 % (ref 35–47)
HGB BLD-MCNC: 7 G/DL (ref 11.7–15.7)
MCH RBC QN AUTO: 30 PG (ref 26.5–33)
MCHC RBC AUTO-ENTMCNC: 34.7 G/DL (ref 31.5–36.5)
MCV RBC AUTO: 87 FL (ref 78–100)
PLATELET # BLD AUTO: 169 10E3/UL (ref 150–450)
POTASSIUM SERPL-SCNC: 3.7 MMOL/L (ref 3.4–5.3)
RBC # BLD AUTO: 2.33 10E6/UL (ref 3.8–5.2)
SODIUM SERPL-SCNC: 137 MMOL/L (ref 135–145)
WBC # BLD AUTO: 6.4 10E3/UL (ref 4–11)

## 2025-02-12 PROCEDURE — 250N000013 HC RX MED GY IP 250 OP 250 PS 637: Performed by: STUDENT IN AN ORGANIZED HEALTH CARE EDUCATION/TRAINING PROGRAM

## 2025-02-12 PROCEDURE — 250N000011 HC RX IP 250 OP 636: Performed by: STUDENT IN AN ORGANIZED HEALTH CARE EDUCATION/TRAINING PROGRAM

## 2025-02-12 PROCEDURE — 250N000009 HC RX 250: Performed by: STUDENT IN AN ORGANIZED HEALTH CARE EDUCATION/TRAINING PROGRAM

## 2025-02-12 PROCEDURE — 36591 DRAW BLOOD OFF VENOUS DEVICE: CPT | Performed by: STUDENT IN AN ORGANIZED HEALTH CARE EDUCATION/TRAINING PROGRAM

## 2025-02-12 PROCEDURE — 85027 COMPLETE CBC AUTOMATED: CPT | Performed by: STUDENT IN AN ORGANIZED HEALTH CARE EDUCATION/TRAINING PROGRAM

## 2025-02-12 PROCEDURE — 120N000002 HC R&B MED SURG/OB UMMC

## 2025-02-12 PROCEDURE — P9047 ALBUMIN (HUMAN), 25%, 50ML: HCPCS | Performed by: STUDENT IN AN ORGANIZED HEALTH CARE EDUCATION/TRAINING PROGRAM

## 2025-02-12 PROCEDURE — 250N000011 HC RX IP 250 OP 636: Performed by: INTERNAL MEDICINE

## 2025-02-12 PROCEDURE — 250N000013 HC RX MED GY IP 250 OP 250 PS 637: Performed by: NURSE PRACTITIONER

## 2025-02-12 PROCEDURE — 99232 SBSQ HOSP IP/OBS MODERATE 35: CPT | Performed by: STUDENT IN AN ORGANIZED HEALTH CARE EDUCATION/TRAINING PROGRAM

## 2025-02-12 PROCEDURE — 80048 BASIC METABOLIC PNL TOTAL CA: CPT | Performed by: STUDENT IN AN ORGANIZED HEALTH CARE EDUCATION/TRAINING PROGRAM

## 2025-02-12 PROCEDURE — 84295 ASSAY OF SERUM SODIUM: CPT | Performed by: STUDENT IN AN ORGANIZED HEALTH CARE EDUCATION/TRAINING PROGRAM

## 2025-02-12 PROCEDURE — 250N000013 HC RX MED GY IP 250 OP 250 PS 637: Performed by: INTERNAL MEDICINE

## 2025-02-12 RX ORDER — LORAZEPAM 2 MG/ML
1 INJECTION INTRAMUSCULAR
Status: COMPLETED | OUTPATIENT
Start: 2025-02-12 | End: 2025-02-13

## 2025-02-12 RX ORDER — ALBUMIN (HUMAN) 12.5 G/50ML
50 SOLUTION INTRAVENOUS ONCE
Status: COMPLETED | OUTPATIENT
Start: 2025-02-12 | End: 2025-02-12

## 2025-02-12 RX ADMIN — Medication 1 TABLET: at 08:12

## 2025-02-12 RX ADMIN — ACETAMINOPHEN 650 MG: 325 TABLET, FILM COATED ORAL at 19:58

## 2025-02-12 RX ADMIN — ACETAMINOPHEN 650 MG: 325 TABLET, FILM COATED ORAL at 08:11

## 2025-02-12 RX ADMIN — ALBUMIN (HUMAN) 50 G: 0.25 INJECTION, SOLUTION INTRAVENOUS at 16:31

## 2025-02-12 RX ADMIN — LIDOCAINE HYDROCHLORIDE 10 ML: 20 SOLUTION ORAL; TOPICAL at 08:16

## 2025-02-12 RX ADMIN — Medication 1 MG: at 00:03

## 2025-02-12 RX ADMIN — Medication 15 MG: at 08:15

## 2025-02-12 RX ADMIN — HYDROMORPHONE HYDROCHLORIDE 2 MG: 2 TABLET ORAL at 16:38

## 2025-02-12 RX ADMIN — HYDROMORPHONE HYDROCHLORIDE 2 MG: 2 TABLET ORAL at 22:46

## 2025-02-12 RX ADMIN — HYDROMORPHONE HYDROCHLORIDE 2 MG: 2 TABLET ORAL at 03:22

## 2025-02-12 RX ADMIN — HYDROMORPHONE HYDROCHLORIDE 2 MG: 2 TABLET ORAL at 13:43

## 2025-02-12 RX ADMIN — LIDOCAINE HYDROCHLORIDE 10 ML: 20 SOLUTION ORAL; TOPICAL at 16:35

## 2025-02-12 RX ADMIN — LIDOCAINE HYDROCHLORIDE 10 ML: 20 SOLUTION ORAL; TOPICAL at 12:21

## 2025-02-12 RX ADMIN — ACETAMINOPHEN 650 MG: 325 TABLET, FILM COATED ORAL at 12:21

## 2025-02-12 RX ADMIN — SENNOSIDES AND DOCUSATE SODIUM 1 TABLET: 50; 8.6 TABLET ORAL at 08:13

## 2025-02-12 RX ADMIN — SENNOSIDES AND DOCUSATE SODIUM 1 TABLET: 50; 8.6 TABLET ORAL at 19:58

## 2025-02-12 RX ADMIN — HYDROMORPHONE HYDROCHLORIDE 2 MG: 2 TABLET ORAL at 19:59

## 2025-02-12 RX ADMIN — ACETAMINOPHEN 650 MG: 325 TABLET, FILM COATED ORAL at 16:06

## 2025-02-12 RX ADMIN — Medication 5 ML: at 11:13

## 2025-02-12 RX ADMIN — HYDROMORPHONE HYDROCHLORIDE 2 MG: 2 TABLET ORAL at 06:24

## 2025-02-12 RX ADMIN — HYDROMORPHONE HYDROCHLORIDE 2 MG: 2 TABLET ORAL at 00:07

## 2025-02-12 RX ADMIN — LIDOCAINE HYDROCHLORIDE 10 ML: 20 SOLUTION ORAL; TOPICAL at 02:20

## 2025-02-12 RX ADMIN — HYDROMORPHONE HYDROCHLORIDE 2 MG: 2 TABLET ORAL at 09:31

## 2025-02-12 ASSESSMENT — ACTIVITIES OF DAILY LIVING (ADL)
ADLS_ACUITY_SCORE: 69
ADLS_ACUITY_SCORE: 65
ADLS_ACUITY_SCORE: 69
ADLS_ACUITY_SCORE: 65
ADLS_ACUITY_SCORE: 69
ADLS_ACUITY_SCORE: 65
ADLS_ACUITY_SCORE: 69
ADLS_ACUITY_SCORE: 65
ADLS_ACUITY_SCORE: 65
ADLS_ACUITY_SCORE: 69

## 2025-02-12 NOTE — PROGRESS NOTES
"  VS: /70 (BP Location: Right arm)   Pulse 82   Temp 98.1  F (36.7  C) (Oral)   Resp 16   Ht 1.6 m (5' 3\")   Wt 62 kg (136 lb 11 oz)   SpO2 100%   BMI 24.21 kg/m     Output/Last BM: SP Catheter, no BM this shift   Activity: Not oob   Skin/Dressing: Dressings changed to left shin, right medical knee   Pain: Reports 10/10 pain to abd, back, buttocks, legs   CMS: A/Ox4, tearful   Diet: Regular diet, fair appetite   LDA: R chest port-a-cath   Equipment:    Plan:    Additional Info: Pt allows repositioning, but immediately after will scoot herself flat onto her back and sitting upright in bed. Education provided on importance of repositioning off buttocks to assist with wound healing     CT called 1840 to complete imaging. Has power port to right chest. Updated oncoming RN     Goal Outcome Evaluation:  Plan of Care Reviewed With: Patient  Overall Patient Progress: No Change  Outcome Evaluation:     "

## 2025-02-12 NOTE — PLAN OF CARE
Goal Outcome Evaluation:                        VS: VSS and afebrile    O2: Stable in RA    Output: Adequate output in suprapubic catheter    Last BM: 2/11   Activity: Refused OOB    Skin: Wounds in coccyx/sacrum  Bilateral upper and lower extremities    Pain: Managed with schedule Dilaudid q 3Hrs    Neuro: Alert and oriented x4   Dressing: All dressings: CDI    Diet: Regular    LDA: Port cath- hep locked    Equipment: Cellphone and personal belongings at bed side    Plan: Cont POC    Additional Info: Anxious, crying at times.comfort and needs attended.    Repositioned q2Hrs.     Refused CT scan tonight. She wanted to talk to her daughter about it tonight and will do in the morning.    Pt  will need to to take  Oral contrast 2 hours ( half solutions per hour)  prior CT scan. Pt will need to have and PIV 22G higher for this procedure per staff. Pt is aware about this. CT staff will call for further instructions.

## 2025-02-12 NOTE — CONSULTS
"Dental Hygiene Consult Service    Alis Hartman MRN# [id number]   YOB: 1955 Age: 69 year old     Date of Admission: 2/5/2025   PCP is Maria Fernanda Eckert  Date of Service: 2/12/2025  Hospital Day #: 7         Reason for Consult:   Referring MD & Reason for Visit: I was asked by Kishan Campbell MD, to see Alis Hartman for a LIMITED oral assessment regarding follow-up for oral pain, edentulous, ulcers on top/bottom gum lines with poor healing     *Please note: dental hygiene services, including oral assessment, are not a replacement for examination by a licensed dentist. The patient has been informed of the oral assessment procedures and has provided verbal consent.       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  Dental and oropharyngeal history is pertinent for pt has established dental home - reports last dental visit was 7-8 months ago; has complete removable maxillary and mandibular dentures - pt does not recall when these were fabricated but denies poor fit; Aranza was assessed by dental hygiene (see Regine Payan's note from 2/11 for details).  The patient reports pain while eating. Pt asks, \"I don't know what I'm supposed to eat.\"          OHIP-5 Questionnaire  COMPLETED 2/11 - SEE REGINE PAYAN'S NOTE FOR DETAILS  In the past SEVEN days:   General Observations   Level of support Patient requires some assistance:  ensuring oral care supplies are kept at bedside   Patient currently in pain Yes: Location: anterior maxillary and mandibular alveolar " ridges; Severity: severe pain; Duration: pt unable to recall onset of ulcerations    Patient wears dentures Yes: Both    Current oral care routine Pt reports no oral cares have been completed (? had green sponge brushes for swabbing at bedside, but states that she was told not to brush often by medical team)   Patient has oral care products Patient does not have oral care products   Extraoral assessment   General appearance Symmetrical and Normal TMJ function   Lymph nodes Symmetrical, no abnormalities, non-tender   Oral Health Assessment Tool (OHAT)   Lips 0=Healthy: smooth, pink, moist   Tongue 0=Healthy: normal, moist, roughness, pink   Gums and Tissues 2=Unhealthy: (ie. swollen, bleeding, ulcers, white/red patches, generalized redness (under dentures) )- Ulcerations present on anterior upper and lower alveolar ridges  (SEE IO PHOTOS)    Maxillary alveolar ridge, adjacent to incisive papilla      2. Maxillary alveolar ridge, labial aspect at midline      3. Mandibular alveolar ridge, lingual aspect (more on floor of mouth)      4. Occlusion of edentulous maxillary and mandibular arches     Saliva 0=Healthy: moist tissues, watery and free flowing saliva   Natural Teeth Yes/No Patient is edentulous and 2=Unhealthy: (ie. 4+ decayed or broken teeth/roots, or very worn down teeth, or less than 4 teeth) - edentulous   Dentures Yes/No 1=Changes: (ie. 1 broken area/tooth or dentures only worn for 1-2 hrs daily, or dentures not named, or loose) - dentures not worn regularly or present in the hospital, but pt states they fit well   Oral Cleanliness 0=Healthy: clean and no food particles or tartar in mouth or dentures   Dental Pain 2=Unhealthy: (ie. there are physical pain signs (swelling or cheek or gum, broken teeth, ulcers), as well as verbal and/or behavioural signs (pulling at face, not eating, aggression)) - physical, verbal, behavioral signs present   OHAT Total Score (0-16) 7   Other Dental Concerns Infrequent  professional dental care   Care provided during assessment Oral Assessment, Intraoral photo(s) alveolar ridges, and Patient education   Follow up DH assessments required? Yes, DH will continue to follow up with pt during IP course of treatment   Connect with [facility] or [place] care? No   Oral Care Plan To optimize oral health during hospitalization and reduce risk of HAIs:  Brush oral soft tissues with soft-bristled toothbrush at least BID  Swish-and-spit with alcohol-free antiseptic rinse BID to reduce oral bacterial load  Frequent application of Vaseline to lips    For denture care:  Pt should refrain from wearing dentures until ulcerations have completely healed     Oral/dental pain management:  Swish-and-spit with MMW and/or viscous lidocaine as ordered  Orajel applied to painful oral tissues prn  Avoid: alcohol, sharp/crunchy foods, lemon-flavored glycerin swabs, spicy/acidic foods, sugary food/candy/beverages, tobacco  Follow soft, bland food diet    Pt should establish/continue outpatient comprehensive dental care at clinic of choice under medical advisement following discharge.            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      Plan:   Consider ordering PRN Orajel to use, alternating with MMW as ordered.   Implement oral care plan as described above. Pt  requires assistance ensuring oral care supplies are kept bedside.   Pt to continue resume comprehensive dental care at established dental home on discharge.     Florence Aragon, [facility], LDH  Message on [employer] mobile or pager *[phone]    Past Medical History   I have reviewed this patient's medical history and updated it with pertinent information if needed.  Past Medical History:   Diagnosis Date    Atrial fibrillation (H)     Depression     Hypertension     Malignant neoplasm of endocervix (H)     Tx with radiation    Near syncope 11/7/2024    Orthopnea 9/21/2024    Other chronic pain     Low back    Schizophrenia (H)     Urinary incontinence        Past Surgical History   I have reviewed this patient's surgical history and updated it with pertinent information if needed.  Past Surgical History:   Procedure Laterality Date    ABDOMINOPLASTY      BIOPSY CERVICAL, LOCAL EXCISION, SINGLE/MULTIPLE  10/26/2011    Procedure:BIOPSY CERVICAL, LOCAL EXCISION, SINGLE/MULTIPLE; EUA, cervical biopsies; Surgeon:BETTY TINEO; Location:[facility] OR    BIOPSY VAGINAL N/A 06/08/2021    Procedure: BIOPSY, VAGINA;  Surgeon: Dany Perez MD;  Location: [facility]    CYSTOSCOPY N/A 05/17/2024    Procedure: Cystoscopy;  Surgeon: Dany Perez MD;  [facility]    CYSTOSTOMY, INSERT TUBE SUPRAPUBIC, COMBINED  07/12/2024    Procedure: Insertion of supra-pubic catheter;  Surgeon: Dany Perez MD;  [facility]    DILATION AND CURETTAGE, WITH ULTRASOUND GUIDANCE N/A 05/17/2024    Procedure: Dilation and curettage of the uterus with drainage of uterine fluid under ultrasound guidance, lysis of vaginal adhesions;  Surgeon: Dany Perez MD;  [facility]    EXAM UNDER ANESTHESIA PELVIC N/A 03/12/2020    Procedure: Exam under anesthsia, vaginal biopsies, possible CO2 laser of the vagina;  Surgeon: Lilliam Roy MD;  [facility]    GI SURGERY  2004    gastric bypass    HYSTERECTOMY TOTAL ABDOMINAL, BILATERAL  "SALPINGO-OOPHORECTOMY, COMBINED Bilateral 07/12/2024    Procedure: Diagnostic laparoscopy converted to exploratory laparotomy, total abdominal hysterectomy, bilateral salpingo-oophorectomy, lysis of adhesions >60 min;  Surgeon: Dany Perez MD;  [facility]    INSERT PORT VASCULAR ACCESS N/A 08/26/2024    Procedure: Insert port vascular access;  Surgeon: Avery Gregory MD;  [facility]    IR CHEST PORT PLACEMENT > 5 YRS OF AGE  08/26/2024    IR NEPHROSTOMY TUBE PLACEMENT LEFT  11/29/2024    LASER CO2 VAGINA N/A 03/02/2015    Procedure: LASER CO2 VAGINA;  Surgeon: Mariela Abdalla MD;  Location: [facility]    LASER CO2 VAGINA N/A 05/24/2019    Procedure: Exam under anesthesia, vaginal biopsies, CO2 laser of the vagina;  Surgeon: Lilliam Roy MD;  Location: [facility]    LASER CO2 VAGINA N/A 06/08/2021    Procedure: Exam under anesthesia, laser ablation of the upper vagina;  Surgeon: Dany Perez MD;  Location: [facility]    PICC DOUBLE LUMEN PLACEMENT Left 11/28/2024    left basilic 5 fr dl power picc 44 cm    REPAIR BLADDER N/A 07/12/2024    Procedure: Repair bladder;  Surgeon: Dany Perez MD;  Location[facility]       Social History   I have reviewed this patient's social history and updated it with pertinent information if needed.  Social History     Tobacco Use    Smoking status: Never    Smokeless tobacco: Never   Substance Use Topics    Alcohol use: Not Currently    Drug use: No     Prior to Admission Medications   Prior to Admission Medications   Prescriptions Last Dose Informant Patient Reported? Taking?   Skin Protectants, Misc. (INTERDRY 10\"X36\") SHEE 2/4/2025 Self No Yes   Sig: Externally apply 10 each topically every 24 hours. Apply to skin folds on abdomen   acetaminophen (TYLENOL) 325 MG tablet 2/5/2025 Morning Self No Yes   Sig: Take 2 tablets (650 mg) by mouth every 6 hours as needed for mild pain   albuterol (PROAIR HFA/PROVENTIL HFA/VENTOLIN HFA) 108 (90 Base) MCG/ACT inhaler " Unknown Self Yes Yes   Sig: Inhale 1-2 puffs into the lungs every 4 hours as needed for shortness of breath   apixaban ANTICOAGULANT (ELIQUIS) 5 MG tablet 2/5/2025 Morning  No Yes   Sig: Take 1 tablet (5 mg) by mouth 2 times daily.   calcium Citrate-vitamin D 500-400 MG-UNIT CHEW 2/5/2025 Morning Self Yes Yes   Sig: Take 1 tablet by mouth daily   cyanocobalamin (CYANOCOBALAMIN) 1000 MCG/ML injection  Self Yes Yes   Sig: Inject 1 mL (1,000 mcg) into a muscle every month.   diclofenac (VOLTAREN) 1 % topical gel Unknown  No Yes   Sig: Apply 4 g topically 4 times daily as needed for moderate pain.   ferrous sulfate (CASSANDRA-IN-SOL) 75 (15 FE) MG/ML oral drops 2/5/2025 Morning Self Yes Yes   Sig: Take 15 mg by mouth daily   hydrocortisone 2.5 % cream Unknown Self Yes Yes   Sig: Apply topically as needed   lidocaine (XYLOCAINE) 5 % external ointment 2/4/2025 Self Yes Yes   Sig: Apply 1 Application topically as needed   melatonin 3 MG tablet 2/4/2025 Bedtime  No Yes   Sig: Take 1 tablet (3 mg) by mouth or Feeding Tube at bedtime.   naloxone (NARCAN) 4 MG/0.1ML nasal spray  Self No Yes   Sig: Spray 1 spray (4 mg) into one nostril alternating nostrils as needed for opioid reversal every 2-3 minutes until assistance arrives   ondansetron (ZOFRAN) 8 MG tablet Unknown Self No Yes   Sig: Take 1 tablet (8 mg) by mouth every 8 hours as needed for nausea   oxyCODONE IR (ROXICODONE) 15 MG tablet 2/4/2025 Evening  Yes Yes   Sig: Take 15 mg by mouth every 4 hours as needed (Chronic Pain).   phenol (CHLORASEPTIC) 1.4 % spray Unknown  No Yes   Sig: Take 1-2 sprays (1-2 mLs) by mouth every hour as needed for sore throat.   polyethylene glycol (MIRALAX) 17 GM/Dose powder 2/1/2025 Self No Yes   Sig: Take 17 g by mouth daily as needed for constipation   prochlorperazine (COMPAZINE) 5 MG tablet Unknown Self No Yes   Sig: Take 1-2 tablets (5-10 mg) by mouth every 6 hours as needed for nausea or vomiting   senna-docusate (SENOKOT-S/PERICOLACE)  8.6-50 MG tablet Unknown Self No Yes   Sig: Take 1 tablet by mouth 2 times daily   triamcinolone (KENALOG) 0.1 % external ointment Unknown Self No Yes   Sig: Apply topically 3 times daily as needed for irritation.      Facility-Administered Medications: None     Allergies   Allergies   Allergen Reactions    Ibuprofen Nausea and Vomiting    Shrimp     Sulfa Antibiotics Rash     Patient started on bactrim 7/24/24 tolerating medication without rash on 7/25      Physical Exam

## 2025-02-12 NOTE — PROGRESS NOTES
Care Management Follow Up    Length of Stay (days): 7    Expected Discharge Date: 02/14/2025     Concerns to be Addressed: discharge planning     Patient plan of care discussed at interdisciplinary rounds: Yes    Anticipated Discharge Disposition:  (TCU vs Home Care (pt has preference of home))     Anticipated Discharge Services:  (if discharging home, [facility] home care RN/PT/OT)  Anticipated Discharge DME:  (No new DME anticipated)    Patient/family educated on Medicare website which has current facility and service quality ratings: yes  Education Provided on the Discharge Plan: Other (see comments) (plan in progress)  Patient/Family in Agreement with the Plan: yes    Referrals Placed by CM/SW:  (None currently)  Private pay costs discussed: Not applicable    Discussed  Partnership in Safe Discharge Planning  document with patient/family: No     Handoff Completed: No, handoff not indicated or clinically appropriate    Additional Information:    Per IDT meeting, hospitalist will call patient's daughter to see if she would be able to meet patient's needs at home.    Received call from Lissa Paiz, patient's Care Coordinator. She reported that patient is on Elderly Waiver and can increase services if needed. Requested call back at [phone]. Orders can be faxed to her at [phone].     Next Steps:     Remain largely the same as yesterday:    Discuss with provider about patient's desire to go against recommendations, discuss whether there is concern about patient's decision making capacity    If discharging to home:  1) Resume home care services and ensure home care nursing is able to provide wound care/teach family wound care   2) Discuss with therapies/IDT what DME is needed    Call Lissa Care Coordinator to discuss discharge plan.          Mari Hernandez, KATHEW, LSW  8 Med Surg   [facility]  Phone: [phone]  Vocera: Searchable by name or group: 8 Med Surg  [place]

## 2025-02-12 NOTE — PROGRESS NOTES
Shift [id number]:Pt admitted on 2/2/25 with MADHU,Hyponatremia, worsening BLE edema and progressive coccyx sores  Summary:Pt will return to TCU  VS:       Pt A/O X 4. Afebrile. VS'S. Lungs- Clear bilaterally Denies nausea, shortness of breath, and chest pain.     Output:       Bowels- + in all four quadrants Last BM 2/12/25.Pt has adequate output from his SP catheter .      Activity:       Pt  not OOB Uses the bedpan .  Repositioned every two hours, pt is refusing  Assist of 2 with repositioning and cares   Skin:   Wounds in the coccyx/sacrum and groin Scattered open wounds to BLE Severe edema to BLE-Lymphedema wraps removed yesterday and pt refused to have them put back on     Pain:       Has pain in the Coccyx and is managed with Dilaudid every three hours .      CMS:       CMS and Neuro's are intact. Denies numbness and tingling in all extremities.      Dressing:     Dressings CDI.   Mepilex to BLE. Bilateral elbows and Coccyx   Diet:       Pt is on a Regular diet .       LDA:       Pt has a Rachael cath_Hep Locked.   Dressing change due 2/12/2   Equipment:        Bilateral heels are elevated off the bed.     Plan:       Pt is able to make needs known and the call light is within the pt's reach. Continue to monitor.       Additional Info:        .Pt needs a CT last night but she refused it and will do in the morning

## 2025-02-13 VITALS
HEART RATE: 98 BPM | SYSTOLIC BLOOD PRESSURE: 119 MMHG | TEMPERATURE: 97.9 F | HEIGHT: 63 IN | BODY MASS INDEX: 24.22 KG/M2 | OXYGEN SATURATION: 100 % | DIASTOLIC BLOOD PRESSURE: 73 MMHG | WEIGHT: 136.69 LBS | RESPIRATION RATE: 18 BRPM

## 2025-02-13 LAB
ABO + RH BLD: NORMAL
ANION GAP SERPL CALCULATED.3IONS-SCNC: 9 MMOL/L (ref 7–15)
BLD GP AB SCN SERPL QL: NEGATIVE
BLD PROD TYP BPU: NORMAL
BLOOD COMPONENT TYPE: NORMAL
BUN SERPL-MCNC: 20.6 MG/DL (ref 8–23)
CALCIUM SERPL-MCNC: 8.9 MG/DL (ref 8.8–10.4)
CHLORIDE SERPL-SCNC: 108 MMOL/L (ref 98–107)
CODING SYSTEM: NORMAL
CREAT SERPL-MCNC: 0.68 MG/DL (ref 0.51–0.95)
CROSSMATCH: NORMAL
EGFRCR SERPLBLD CKD-EPI 2021: >90 ML/MIN/1.73M2
ERYTHROCYTE [DISTWIDTH] IN BLOOD BY AUTOMATED COUNT: 19.9 % (ref 10–15)
GLUCOSE SERPL-MCNC: 79 MG/DL (ref 70–99)
HCO3 SERPL-SCNC: 24 MMOL/L (ref 22–29)
HCT VFR BLD AUTO: 17.1 % (ref 35–47)
HGB BLD-MCNC: 5.9 G/DL (ref 11.7–15.7)
ISSUE DATE AND TIME: NORMAL
MCH RBC QN AUTO: 30.3 PG (ref 26.5–33)
MCHC RBC AUTO-ENTMCNC: 34.5 G/DL (ref 31.5–36.5)
MCV RBC AUTO: 88 FL (ref 78–100)
PLATELET # BLD AUTO: 122 10E3/UL (ref 150–450)
POTASSIUM SERPL-SCNC: 3.3 MMOL/L (ref 3.4–5.3)
RBC # BLD AUTO: 1.95 10E6/UL (ref 3.8–5.2)
SODIUM SERPL-SCNC: 141 MMOL/L (ref 135–145)
SPECIMEN EXP DATE BLD: NORMAL
UNIT ABO/RH: NORMAL
UNIT NUMBER: NORMAL
UNIT STATUS: NORMAL
UNIT TYPE ISBT: 5100
WBC # BLD AUTO: 4.2 10E3/UL (ref 4–11)

## 2025-02-13 PROCEDURE — 85027 COMPLETE CBC AUTOMATED: CPT | Performed by: STUDENT IN AN ORGANIZED HEALTH CARE EDUCATION/TRAINING PROGRAM

## 2025-02-13 PROCEDURE — 250N000013 HC RX MED GY IP 250 OP 250 PS 637: Performed by: STUDENT IN AN ORGANIZED HEALTH CARE EDUCATION/TRAINING PROGRAM

## 2025-02-13 PROCEDURE — 36592 COLLECT BLOOD FROM PICC: CPT | Performed by: STUDENT IN AN ORGANIZED HEALTH CARE EDUCATION/TRAINING PROGRAM

## 2025-02-13 PROCEDURE — 36591 DRAW BLOOD OFF VENOUS DEVICE: CPT | Performed by: STUDENT IN AN ORGANIZED HEALTH CARE EDUCATION/TRAINING PROGRAM

## 2025-02-13 PROCEDURE — 120N000002 HC R&B MED SURG/OB UMMC

## 2025-02-13 PROCEDURE — 250N000011 HC RX IP 250 OP 636: Performed by: INTERNAL MEDICINE

## 2025-02-13 PROCEDURE — 80048 BASIC METABOLIC PNL TOTAL CA: CPT | Performed by: STUDENT IN AN ORGANIZED HEALTH CARE EDUCATION/TRAINING PROGRAM

## 2025-02-13 PROCEDURE — 250N000009 HC RX 250: Performed by: INTERNAL MEDICINE

## 2025-02-13 PROCEDURE — 86900 BLOOD TYPING SEROLOGIC ABO: CPT | Performed by: STUDENT IN AN ORGANIZED HEALTH CARE EDUCATION/TRAINING PROGRAM

## 2025-02-13 PROCEDURE — 86923 COMPATIBILITY TEST ELECTRIC: CPT | Performed by: STUDENT IN AN ORGANIZED HEALTH CARE EDUCATION/TRAINING PROGRAM

## 2025-02-13 PROCEDURE — 99233 SBSQ HOSP IP/OBS HIGH 50: CPT | Performed by: STUDENT IN AN ORGANIZED HEALTH CARE EDUCATION/TRAINING PROGRAM

## 2025-02-13 PROCEDURE — 99223 1ST HOSP IP/OBS HIGH 75: CPT | Performed by: CLINICAL NURSE SPECIALIST

## 2025-02-13 PROCEDURE — 250N000013 HC RX MED GY IP 250 OP 250 PS 637: Performed by: INTERNAL MEDICINE

## 2025-02-13 PROCEDURE — 82435 ASSAY OF BLOOD CHLORIDE: CPT | Performed by: STUDENT IN AN ORGANIZED HEALTH CARE EDUCATION/TRAINING PROGRAM

## 2025-02-13 PROCEDURE — 250N000009 HC RX 250: Performed by: STUDENT IN AN ORGANIZED HEALTH CARE EDUCATION/TRAINING PROGRAM

## 2025-02-13 PROCEDURE — 250N000013 HC RX MED GY IP 250 OP 250 PS 637: Performed by: CLINICAL NURSE SPECIALIST

## 2025-02-13 PROCEDURE — P9016 RBC LEUKOCYTES REDUCED: HCPCS | Performed by: STUDENT IN AN ORGANIZED HEALTH CARE EDUCATION/TRAINING PROGRAM

## 2025-02-13 PROCEDURE — 86901 BLOOD TYPING SEROLOGIC RH(D): CPT | Performed by: STUDENT IN AN ORGANIZED HEALTH CARE EDUCATION/TRAINING PROGRAM

## 2025-02-13 PROCEDURE — 250N000011 HC RX IP 250 OP 636: Performed by: STUDENT IN AN ORGANIZED HEALTH CARE EDUCATION/TRAINING PROGRAM

## 2025-02-13 RX ORDER — HYDROMORPHONE HYDROCHLORIDE 2 MG/1
2 TABLET ORAL
Status: CANCELLED | OUTPATIENT
Start: 2025-02-13

## 2025-02-13 RX ORDER — ACETAMINOPHEN 325 MG/1
975 TABLET ORAL 3 TIMES DAILY
Status: DISPENSED | OUTPATIENT
Start: 2025-02-13

## 2025-02-13 RX ORDER — HEPARIN SODIUM (PORCINE) LOCK FLUSH IV SOLN 100 UNIT/ML 100 UNIT/ML
5-10 SOLUTION INTRAVENOUS
Status: DISPENSED | OUTPATIENT
Start: 2025-02-13

## 2025-02-13 RX ORDER — HEPARIN SODIUM,PORCINE 10 UNIT/ML
5-10 VIAL (ML) INTRAVENOUS EVERY 24 HOURS
Status: ACTIVE | OUTPATIENT
Start: 2025-02-13

## 2025-02-13 RX ORDER — LIDOCAINE 4 G/G
2 PATCH TOPICAL
Status: DISPENSED | OUTPATIENT
Start: 2025-02-13

## 2025-02-13 RX ORDER — HEPARIN SODIUM,PORCINE 10 UNIT/ML
5-10 VIAL (ML) INTRAVENOUS
Status: ACTIVE | OUTPATIENT
Start: 2025-02-13

## 2025-02-13 RX ORDER — IOPAMIDOL 755 MG/ML
100 INJECTION, SOLUTION INTRAVASCULAR ONCE
Status: DISCONTINUED | OUTPATIENT
Start: 2025-02-13 | End: 2025-02-14

## 2025-02-13 RX ADMIN — LIDOCAINE HYDROCHLORIDE 10 ML: 20 SOLUTION ORAL; TOPICAL at 13:23

## 2025-02-13 RX ADMIN — LIDOCAINE HYDROCHLORIDE 10 ML: 20 SOLUTION ORAL; TOPICAL at 15:22

## 2025-02-13 RX ADMIN — ACETAMINOPHEN 650 MG: 325 TABLET, FILM COATED ORAL at 07:45

## 2025-02-13 RX ADMIN — SENNOSIDES AND DOCUSATE SODIUM 1 TABLET: 50; 8.6 TABLET ORAL at 07:45

## 2025-02-13 RX ADMIN — LORAZEPAM 1 MG: 2 INJECTION INTRAMUSCULAR; INTRAVENOUS at 08:27

## 2025-02-13 RX ADMIN — ACETAMINOPHEN 975 MG: 325 TABLET, FILM COATED ORAL at 13:23

## 2025-02-13 RX ADMIN — HYDROMORPHONE HYDROCHLORIDE 0.3 MG: 1 INJECTION, SOLUTION INTRAMUSCULAR; INTRAVENOUS; SUBCUTANEOUS at 06:28

## 2025-02-13 RX ADMIN — ACETAMINOPHEN 975 MG: 325 TABLET, FILM COATED ORAL at 20:05

## 2025-02-13 RX ADMIN — Medication 15 MG: at 10:54

## 2025-02-13 RX ADMIN — Medication 5 ML: at 10:55

## 2025-02-13 RX ADMIN — SENNOSIDES AND DOCUSATE SODIUM 1 TABLET: 50; 8.6 TABLET ORAL at 20:05

## 2025-02-13 RX ADMIN — HYDROMORPHONE HYDROCHLORIDE 2 MG: 2 TABLET ORAL at 01:55

## 2025-02-13 RX ADMIN — HYDROMORPHONE HYDROCHLORIDE 2 MG: 2 TABLET ORAL at 07:45

## 2025-02-13 RX ADMIN — LIDOCAINE HYDROCHLORIDE 10 ML: 20 SOLUTION ORAL; TOPICAL at 01:57

## 2025-02-13 RX ADMIN — Medication 5 ML: at 07:12

## 2025-02-13 RX ADMIN — LIDOCAINE 4% 2 PATCH: 40 PATCH TOPICAL at 20:05

## 2025-02-13 RX ADMIN — LIDOCAINE HYDROCHLORIDE 10 ML: 20 SOLUTION ORAL; TOPICAL at 17:50

## 2025-02-13 RX ADMIN — Medication 1 TABLET: at 07:45

## 2025-02-13 RX ADMIN — HYDROMORPHONE HYDROCHLORIDE 2 MG: 2 TABLET ORAL at 04:42

## 2025-02-13 RX ADMIN — HYDROMORPHONE HYDROCHLORIDE 0.3 MG: 1 INJECTION, SOLUTION INTRAMUSCULAR; INTRAVENOUS; SUBCUTANEOUS at 00:30

## 2025-02-13 ASSESSMENT — ACTIVITIES OF DAILY LIVING (ADL)
ADLS_ACUITY_SCORE: 73
ADLS_ACUITY_SCORE: 69
ADLS_ACUITY_SCORE: 73
ADLS_ACUITY_SCORE: 69
ADLS_ACUITY_SCORE: 77
ADLS_ACUITY_SCORE: 69
ADLS_ACUITY_SCORE: 77
ADLS_ACUITY_SCORE: 69
ADLS_ACUITY_SCORE: 77
ADLS_ACUITY_SCORE: 73
ADLS_ACUITY_SCORE: 69
ADLS_ACUITY_SCORE: 69
ADLS_ACUITY_SCORE: 77
ADLS_ACUITY_SCORE: 69
ADLS_ACUITY_SCORE: 77
ADLS_ACUITY_SCORE: 77
ADLS_ACUITY_SCORE: 73
ADLS_ACUITY_SCORE: 77
ADLS_ACUITY_SCORE: 73
ADLS_ACUITY_SCORE: 69
ADLS_ACUITY_SCORE: 77
ADLS_ACUITY_SCORE: 73
ADLS_ACUITY_SCORE: 77

## 2025-02-13 NOTE — PLAN OF CARE
"VS: BP 95/56 (BP Location: Right arm)   Pulse 95   Temp 97.9  F (36.6  C) (Oral)   Resp 18   Ht 1.6 m (5' 3\")   Wt 62 kg (136 lb 11 oz)   SpO2 100%   BMI 24.21 kg/m      O2: Stable on room air no SOB or CP.   Neuro: AOx4   Bowel/Bladder: No BM this shift. Supra-pubic cath in place. No vaginal bleeding this shift.   LBM: 2/11   Diet: Regular diet    Skin: Sores on coccyx and legs. Sore inside mouth bottom palate under tongue.    Pain: Pain managed with scheduled and PRN medications.   Activity: Not OOB. Turn q2h or more frequent.   Dressings: Mepilex on elbows-P. Mepilex on coccyx wound, Mepilex on open sores on legs.   LDA: R chest port a cath-SL   Equipment: Call light within reach   Plan: Continue with plan of care   Additional Info: CT was not completed this shift by the time they were ready (2300) patient did not want to go down, coordinated with CT to have put on list for tomorrow.    Provider paged for PRN order of magic mouth wash for sore inside patient mouth.       Elif Moss RN on 2/13/2025 at 2:56 AM       Goal Outcome Evaluation:      Plan of Care Reviewed With: patient    Overall Patient Progress: improvingOverall Patient Progress: improving    Outcome Evaluation: Pain managed with scheduled/PRN medications. NO acute changes overnight.      "

## 2025-02-13 NOTE — CONSULTS
Palliative Care Consultation Note  [facility]      Patient: Alis Hartman  Date of Admission:  2/5/2025    Requesting Clinician / Team:     Mukesh Bermudez DO     Reason for consult: Pain management  Symptom management  Goals of care       Recommendations & Counseling     GOALS OF CARE:   Unable to address goals of care today, patient unable to participate, was not a good time to talk on the phone as daughter was anxious about how sleepy she was at that time.   Would recommend goals of care discussions in the coming days, once there is more information if possibly about the cause of her vaginal bleeding, such as if its from a recurrence of cancer. She certainly has had a health decline most recently that also warrants a conversation about goals.  Daughter is very clear she wants a phone call anytime medications are changed and if a medication is being given that is known to have a side effect of sleepiness/drowsiness/sedation of any kind.    ADVANCE CARE PLANNING:  No health care directive on file. Per system policy, Surrogate Decision-makers for Patients With Diminished Decision-making Capacity offers guidance on possible decision-makers. Children has been identified as a surrogate decision maker.   There is a POLST form on file, this was reviewed and current.  Code status: Full Code    MEDICAL MANAGEMENT:   #Pain, chronic, unable to assess patient given her level of sedation at this visit, called her daughter, daughter is concerned about how sedated she is. We discussed that her usual pain is her abdomen. Nursing staff and medical staff also note she complains of coccyx pain. Unclear what cause of abdominal pain is from at this time. She does take chronic opioids outpatient and is seen by I believe a pain provider.  Consider working up abdominal pain if appropriate  Added morphine gel to open wounds for pain control  Stopped scheduled hydromorphone doses given  sedation  Started oxycodone 15 mg q4h PRN (her home dose), hold if sedated  Changed acetaminophen to 975 mg TID  Added lidocaine patches to painful areas of abdomen and back  Would consider other adjuvants for her pain management such as duloxetine or methocarbamol however would need to discuss with daughter before starting.     #Anxiety, Generalized Anxiety  Use/practice relaxation strategies, Effectively communicate with medical provider re needed information, Effectively communicate with family/friends re needs, Identify triggers for anxiety/panic attacks, Identify early signs/symptoms of anxiety, and Identify effective coping strategies  Unfortunately most anxiety short acting medications cause sedation (lorazepam, hydroxyzine, buspar), and would recommend duloxetine given benefit of mood and pain from it, would need to discuss with daughter.      PSYCHOSOCIAL/SPIRITUAL SUPPORT:  Family daughter Joy    Palliative Care will continue to follow. Thank you for the consult and allowing us to aid in the care of Alis Hartman.    These recommendations have been discussed with primary team, bedside nurse, pain team.    ERINN Vela CNS  [facility], Palliative Care  Securely message with the Vocera Web Console (learn more here) or  Text page via [facility] Paging/Directory         Assessment      Alis Hartman is a 69 year old female with a past medical history of cervical cancer s/p radiation, seriuos adenocarcinoma s/p SNEHA/BSO and cystotomy w/ suprapubic catheter placement 07/2024 and 3 cycles of carbo/taxol (10/2024), hx ESBL urosepsis and bacteremia, RNY gastric bypass, afib on apixaban, HTN, CKD stage 3, and recent admission 1/25-1/27 for AMS, hematuria, pyuria, presented on 2/5 with MADHU, hyponatremia, worsening BLE edema, progressive coccyx sores. Her hospital course has been complicated now by new and significant bleeding most likely from her vagina. Gyn/Onc was consulted and recommended  CT. She has been unable to get this CT due to inability to lay flat    Today, the patient was seen for:  AMS  Chronic pain  Coccyx pressure ulcer wounds  Leg wounds  Vaginal bleeding    History of Present Illness   Met with patient and daughterViri Hamilton.   Introduced the role of palliative care as an interdisciplinary team that cares for patients with serious illness to help support symptom management, communication, coping for patients and their families as well as support with medical decision making.    Prognosis, Goals, & Planning:   Functional Status just prior to this current hospitalization:  ECOG3 (Capable of only limited self-care; needs help with ADLs; in bed/chair >50% of waking hours)    Prognosis, Goals, and/or Advance Care Planning:  IGOR at this time    Code Status was addressed today:   No      Patient's decision making preferences: unable to assess        Patient has decision-making capacity today for complex decisions: Unreliable          Coping, Meaning, & Spirituality:   Mood, coping, and/or meaning in the context of serious illness were addressed today: Yes daughter is anxious about what is going on with her being so sleepy. Attempted to provide information about why that is and things that we can do to help from it getting worse.    Social:   Important relationships/caregivers:daughter- Joy    Medications:  Reviewed this patient's medication profile and medications from this hospitalization. Notable medications:  Acetaminophen 650 mg 4 times a day  Hydromorphone 2 mg q3h scheduled  Magic mouthwash TID  Senna BID  Hydromorphone IV 0.3 mg q4h PRN -x2  Lorazepam 1 mg once PRN for CT  Magic mouthwash PRN -x1  Melatonin 1 mg at bedtime PRN -x1    Home prescriptions: 15 mg oxycodone 5 times a day and oxycodone 5 mg 3 times a day- 90 oxycodone a day = 112.5 OME    OME now 200+6= 206    [place] Board of Pharmacy Data Base Reviewed: Yes:   reviewed - controlled substances prescribed by other  outside provider(s).    ROS:  Comprehensive ROS is reviewed and is negative except as here & per HPI:     Physical Exam   Vital Signs with Ranges  Temp:  [97.8  F (36.6  C)-97.9  F (36.6  C)] 97.9  F (36.6  C)  Pulse:  [95-99] 99  Resp:  [17-18] 17  BP: ()/(56-67) 95/56  SpO2:  [98 %-100 %] 98 %  Wt Readings from Last 10 Encounters:   02/05/25 62 kg (136 lb 11 oz)   01/25/25 70.8 kg (156 lb)   01/14/25 70.8 kg (156 lb)   01/07/25 70.8 kg (156 lb)   12/30/24 66.2 kg (146 lb)   12/12/24 85.3 kg (188 lb)   12/08/24 85.5 kg (188 lb 7.9 oz)   11/09/24 63.2 kg (139 lb 5.3 oz)   11/04/24 63.5 kg (140 lb)   10/15/24 63.5 kg (140 lb)     136 lbs 10.96 oz    PHYSICAL EXAM:  Constitutional: drowsy, cooperative, elderly appearing, no apparent distress  Lungs: No increased work of breathing, good air exchange  Neurologic: drowsy, was able to identify a picture of a person on her phone as her granddaughter  Skin: No rashes, erythema    Data reviewed:  Results for orders placed or performed during the hospital encounter of 02/05/25 (from the past 24 hours)   Basic metabolic panel   Result Value Ref Range    Sodium 137 135 - 145 mmol/L    Potassium 3.7 3.4 - 5.3 mmol/L    Chloride 104 98 - 107 mmol/L    Carbon Dioxide (CO2) 23 22 - 29 mmol/L    Anion Gap 10 7 - 15 mmol/L    Urea Nitrogen 22.7 8.0 - 23.0 mg/dL    Creatinine 0.78 0.51 - 0.95 mg/dL    GFR Estimate 81 >60 mL/min/1.73m2    Calcium 8.9 8.8 - 10.4 mg/dL    Glucose 86 70 - 99 mg/dL   CBC with platelets   Result Value Ref Range    WBC Count 6.4 4.0 - 11.0 10e3/uL    RBC Count 2.33 (L) 3.80 - 5.20 10e6/uL    Hemoglobin 7.0 (L) 11.7 - 15.7 g/dL    Hematocrit 20.2 (L) 35.0 - 47.0 %    MCV 87 78 - 100 fL    MCH 30.0 26.5 - 33.0 pg    MCHC 34.7 31.5 - 36.5 g/dL    RDW 19.7 (H) 10.0 - 15.0 %    Platelet Count 169 150 - 450 10e3/uL   CBC with platelets   Result Value Ref Range    WBC Count 4.2 4.0 - 11.0 10e3/uL    RBC Count 1.95 (L) 3.80 - 5.20 10e6/uL    Hemoglobin 5.9  (LL) 11.7 - 15.7 g/dL    Hematocrit 17.1 (L) 35.0 - 47.0 %    MCV 88 78 - 100 fL    MCH 30.3 26.5 - 33.0 pg    MCHC 34.5 31.5 - 36.5 g/dL    RDW 19.9 (H) 10.0 - 15.0 %    Platelet Count 122 (L) 150 - 450 10e3/uL   Basic metabolic panel   Result Value Ref Range    Sodium 141 135 - 145 mmol/L    Potassium 3.3 (L) 3.4 - 5.3 mmol/L    Chloride 108 (H) 98 - 107 mmol/L    Carbon Dioxide (CO2) 24 22 - 29 mmol/L    Anion Gap 9 7 - 15 mmol/L    Urea Nitrogen 20.6 8.0 - 23.0 mg/dL    Creatinine 0.68 0.51 - 0.95 mg/dL    GFR Estimate >90 >60 mL/min/1.73m2    Calcium 8.9 8.8 - 10.4 mg/dL    Glucose 79 70 - 99 mg/dL     Recent Labs   Lab 02/13/25  0715 02/12/25  1158 02/11/25  0653 02/11/25  0652 02/09/25  1035 02/08/25  0928   WBC 4.2 6.4 6.5  --    < >  --    HGB 5.9* 7.0* 7.2*  --    < >  --    MCV 88 87 86  --    < >  --    * 169 176  --    < >  --     137  --  139   < > 131*   POTASSIUM 3.3* 3.7  --  4.0   < > 4.5   CHLORIDE 108* 104  --  105   < > 99   CO2 24 23  --  24   < > 22   BUN 20.6 22.7  --  19.2   < > 28.8*   CR 0.68 0.78  --  0.72   < > 1.30*   ANIONGAP 9 10  --  10   < > 10   SAMUEL 8.9 8.9  --  9.1   < > 8.2*   GLC 79 86  --  83   < > 72   ALBUMIN  --   --   --   --   --  2.6*   PROTTOTAL  --   --   --   --   --  4.7*   BILITOTAL  --   --   --   --   --  0.5   ALKPHOS  --   --   --   --   --  110   ALT  --   --   --   --   --  15   AST  --   --   --   --   --  28    < > = values in this interval not displayed.       No results found for this or any previous visit (from the past 24 hours).    Medical Decision Making       80 MINUTES SPENT BY ME on the date of service doing chart review, history, exam, documentation & further activities per the note.

## 2025-02-13 NOTE — PROGRESS NOTES
"Shift Report: 0700-2330  VS: /83   Pulse 92   Temp 97.2  F (36.2  C) (Oral)   Resp 18   Ht 1.6 m (5' 3\")   Wt 62 kg (136 lb 11 oz)   SpO2 98%   BMI 24.21 kg/m     Output/Last BM: SP catheter, no BM this shift   Activity: Not oob   Skin/Dressing: Sacrum dressing changed   Pain: Palliative care consult, see new changes   CMS: A/Ox4   Diet: Regular diet, fair appetite   LDA: R Chest port-a-cath   Equipment:    Plan:    Additional Info: -Critical Hbg 5.9, MD paged  -Pt went down to complete CT scan, unable to complete as pt unable to lay flat on exam table   -Daughter visiting in the afternoon  -Blood administered  -Has been resting most of the shift in bed being repositioned     Goal Outcome Evaluation:  Plan of Care Reviewed With: Patient  Overall Patient Progress: No Change  Outcome Evaluation:     "

## 2025-02-13 NOTE — PROGRESS NOTES
[facility]    Medicine Progress Note - Hospitalist Service, GOLD TEAM 22    Date of Admission:  2/5/2025    Assessment & Plan   Alis Hartman is a 69 year old woman admitted on 2/5/2025. 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    #Acute on Chronic anemia  #Vaginal bleeding  #Gross Hematuria  Likely multifactorial 2/2 malignancy, renal disease, nutrition. Likely hemoconcentrated at admission, down to 7.5 and recent baseline appears near this.  Continues to have bleeding on 12/7 with hemoglobin down to 7.2.  UA with large blood but patient has bright red blood in opening of vagina on exam per WOC nurse.  Therefore, suspect vaginal source.  Vaginal bleeding present on digital exam with minimal tolerability from patient and No overt fungating mass or vaginal abnormality noted, possible nodularity in right vaginal apex vs agglutination per gyn-onc.  - Continued blood in depends; appears to be in vaginal canal per WOC assessment - GYN-ONC referral placed   - CT A/P with IV contrast to assess for disease recurrence for prognostication discussions given new onset vaginal bleeding - per gyn-onc, likely etiology is atrophic vagina but she has a high risk for recurrence with this histology  - Hold DOAC for now 2/12 (discussed risk and benefit with Aranza on 2/12) given vaginal bleeding and anemia  - Hgb daily; will assess transfusion need based on clinical context  - Will determine need to discuss with urology pending gyn-onc assessment  - Hgb 7 this AM; will recheck in AM with anticipation of  transfusion need tomorrow     # MADHU on CKD3 - resolved  # hyponatremia, likely hypovolemic - resolved    Cr worsened on admission compared to prior, slowly worsening since 1/25. Overall studies suggest prerenal but iniitally didn't improve much with small IVF+ encouraging PO intake but this has been limited. History of  hydronephrosis related to her endometrial cancer s/p PNT removal, L just in December 2024. Cystatin C expectedly lower, GFR estimated to be <30.   Hyponatremia has been worsening in the last few weeks in the setting of poor PO intake, now likely prerenal MADHU, poor nutrition. Renal US negative for hydronephrosis. Cr peaked at 1.44, now Cr and electrolytes improving on IV albumin, now almost back to prior baseline.  Suspect prerenal in the setting of low oncotic pressure; responsive to albumin now back to baseline.  -Creatinine back to baseline, last albumin was 2/10  - Will give albumin today prior to contrast load for CT imaging  - renal consulted, appreciate recs, have since signed off but should follow up outpatient given CKD     # Chronic bilateral lower extremity edema  # Deconditioning  # Sacral pressure ulcer, POA, worsening   # Severe malnutrition in the context of chronic illness   Hx of chronic edema, no PTA diuretics. Echo 11/24 reassuring EF but poor study, cant comment on diastolic function, edema likely due to some degree of proteinuria and low oncotic pressure with low albumin/malnutrition.  Last hospitalization recommendation was to transitional care unit last hospitalization, however per patient preference and for her mental health discharged to home with home care and family support in place. Unfortunately symptoms continued to progress at home.  Improved swelling with therapies as below  - Followed now by lymphedema, WOC RN, PT,  RD   - social work consulted re:placement/discharge planning   - continue dilaudid PO, ok to give occasional addition if needed     #Hx of ESBL urosepsis  and bacteremia  #Bladder dysfunction s/p cystostomy and suprapubic catheter  Recently admitted 11/26 - 12/8/2024 for ESBL urosepsis and bacteremia requiring ICU w/ L nephrostomy tube (removed 1/7) treated w/ ertapenem, returned 1/25-1/27 for concern for UTI but no clear infection at that time.  ID was consulted that admission and recommended avoiding frequent UA and Ucx checks in future unless patient w/ symptoms of UTI.   - UA/Ucx abnormal at admission, likely colonization, will continue to monitor off antibiotics   - may be overdue for suprapubic cath exchange - will look into this    #Serous endometrial carcinoma  Follows w/ heme/onc through [facility]. S/p SNEHA, BSO 07/2024 s/p cycle 3 carbo/taxel 10/15/2024, cycle 4 delayed in s/o recent admission, family ultimately decided to forgo any further treatment.      Disposition: Will discuss this with daughter over the phone this evening        Diet: Combination Diet Regular Diet Adult  Snacks/Supplements Adult: Other; Send chocolate Ensure Enlive w/ breakfast, strawberry Ensure+HP with dinner; With Meals  Fluid restriction 2000 ML FLUID    DVT Prophylaxis: Mechanical  Shahid Catheter: Not present  Lines: PRESENT      Port a Cath 02/05/25 Single Lumen Right Chest wall-Site Assessment: WDL      Cardiac Monitoring: None  Code Status: Full Code      Clinically Significant Risk Factors               # Hypoalbuminemia: Lowest albumin = 1.9 g/dL at 2/5/2025  2:52 PM, will monitor as appropriate     # Hypertension: Noted on problem list             # Severe Malnutrition: based on nutrition assessment    # Financial/Environmental Concerns: unable to afford rent/mortgage (pt interested in resources/info on getting assistance paying rent, as well as applying for county benefits)         Social Drivers of Health            Disposition Plan     Medically Ready for Discharge: Anticipated in 2-4 Days         Mukesh Bermudez DO, MPH  Internal Medicine-Pediatrics  Hospitalist  Hospitalist Service, GOLD TEAM 22  [facility]  Securely message with [email or web address] (more info)  Text page via [facility] Paging/Directory   See signed in provider for up to date coverage information  ______________________________________________________________________    Interval History   Patient seen at bedside this morning.  Patient reports feeling better today and desires to go home.  Has not had a chance to talk with her daughter regarding this.  She reports that her vaginal bleeding is better today.  Discussed stopping her anticoagulation given her downtrending hemoglobin and vaginal bleeding in the setting of indication of atrial fibrillation for her anticoagulation.  I recommendation was to stop it and she agrees.  She is nervous to get the CT scan done and requesting medication to assist with her anxiety.      Physical Exam   Vital Signs: Temp: 97.8  F (36.6  C) Temp src: Oral BP: 117/67 Pulse: 95   Resp: 18 SpO2: 100 % O2 Device: None (Room air)    Weight: 136 lbs 10.96 oz    Gen: alert, interactive and pleasant, lying in bed, chronically ill appearing, appears comfortable   HEENT: Normocephalic/atraumatic, sclera clear, edentulous, oropharynx with mildly dry mucous membranes, stable ulcer on upper and lower middle gums  Resp: Clear bilaterally anteriorly, easy work of breathing on room air, no wheezing, shallow breathing   CV: Regular rate and rhythm, no murmurs noted    Abd: soft, diffusely tender, nondistended. Suprapubic cath in place without bleeding noted   : some bright red blood on the depends  Ext: distal LE edema 2+ pitting in thighs   Neuro: Alert and oriented x4, grossly non-focal, generalized weakness but moving all extremities equally    Medical Decision Making       Data     I have personally reviewed the following data over the past 24 hrs:    6.4  \   7.0 (L)   / 169     137 104 22.7 /  86   3.7 23 0.78 \

## 2025-02-14 ENCOUNTER — APPOINTMENT (OUTPATIENT)
Dept: PHYSICAL THERAPY | Facility: CLINIC | Age: 70
End: 2025-02-14
Payer: MEDICARE

## 2025-02-14 LAB
ANION GAP SERPL CALCULATED.3IONS-SCNC: 10 MMOL/L (ref 7–15)
BUN SERPL-MCNC: 17.9 MG/DL (ref 8–23)
CALCIUM SERPL-MCNC: 8.9 MG/DL (ref 8.8–10.4)
CHLORIDE SERPL-SCNC: 108 MMOL/L (ref 98–107)
CREAT SERPL-MCNC: 0.71 MG/DL (ref 0.51–0.95)
EGFRCR SERPLBLD CKD-EPI 2021: 90 ML/MIN/1.73M2
ERYTHROCYTE [DISTWIDTH] IN BLOOD BY AUTOMATED COUNT: 18.8 % (ref 10–15)
GLUCOSE SERPL-MCNC: 110 MG/DL (ref 70–99)
HCO3 SERPL-SCNC: 23 MMOL/L (ref 22–29)
HCT VFR BLD AUTO: 25.4 % (ref 35–47)
HGB BLD-MCNC: 8.8 G/DL (ref 11.7–15.7)
MCH RBC QN AUTO: 30 PG (ref 26.5–33)
MCHC RBC AUTO-ENTMCNC: 34.6 G/DL (ref 31.5–36.5)
MCV RBC AUTO: 87 FL (ref 78–100)
PLATELET # BLD AUTO: 143 10E3/UL (ref 150–450)
POTASSIUM SERPL-SCNC: 3.4 MMOL/L (ref 3.4–5.3)
RBC # BLD AUTO: 2.93 10E6/UL (ref 3.8–5.2)
SODIUM SERPL-SCNC: 141 MMOL/L (ref 135–145)
WBC # BLD AUTO: 7.6 10E3/UL (ref 4–11)

## 2025-02-14 PROCEDURE — 250N000013 HC RX MED GY IP 250 OP 250 PS 637: Performed by: INTERNAL MEDICINE

## 2025-02-14 PROCEDURE — 250N000013 HC RX MED GY IP 250 OP 250 PS 637: Performed by: STUDENT IN AN ORGANIZED HEALTH CARE EDUCATION/TRAINING PROGRAM

## 2025-02-14 PROCEDURE — 250N000013 HC RX MED GY IP 250 OP 250 PS 637: Performed by: CLINICAL NURSE SPECIALIST

## 2025-02-14 PROCEDURE — 250N000011 HC RX IP 250 OP 636: Performed by: INTERNAL MEDICINE

## 2025-02-14 PROCEDURE — 120N000002 HC R&B MED SURG/OB UMMC

## 2025-02-14 PROCEDURE — 250N000009 HC RX 250: Performed by: STUDENT IN AN ORGANIZED HEALTH CARE EDUCATION/TRAINING PROGRAM

## 2025-02-14 PROCEDURE — 82565 ASSAY OF CREATININE: CPT | Performed by: STUDENT IN AN ORGANIZED HEALTH CARE EDUCATION/TRAINING PROGRAM

## 2025-02-14 PROCEDURE — 85049 AUTOMATED PLATELET COUNT: CPT | Performed by: STUDENT IN AN ORGANIZED HEALTH CARE EDUCATION/TRAINING PROGRAM

## 2025-02-14 PROCEDURE — 85014 HEMATOCRIT: CPT | Performed by: STUDENT IN AN ORGANIZED HEALTH CARE EDUCATION/TRAINING PROGRAM

## 2025-02-14 PROCEDURE — 36591 DRAW BLOOD OFF VENOUS DEVICE: CPT | Performed by: STUDENT IN AN ORGANIZED HEALTH CARE EDUCATION/TRAINING PROGRAM

## 2025-02-14 PROCEDURE — 99233 SBSQ HOSP IP/OBS HIGH 50: CPT | Performed by: STUDENT IN AN ORGANIZED HEALTH CARE EDUCATION/TRAINING PROGRAM

## 2025-02-14 PROCEDURE — 97110 THERAPEUTIC EXERCISES: CPT | Mod: GP

## 2025-02-14 PROCEDURE — 82435 ASSAY OF BLOOD CHLORIDE: CPT | Performed by: STUDENT IN AN ORGANIZED HEALTH CARE EDUCATION/TRAINING PROGRAM

## 2025-02-14 RX ADMIN — OXYCODONE 15 MG: 5 TABLET ORAL at 17:11

## 2025-02-14 RX ADMIN — Medication 5 ML: at 11:45

## 2025-02-14 RX ADMIN — LIDOCAINE HYDROCHLORIDE 10 ML: 20 SOLUTION ORAL; TOPICAL at 17:12

## 2025-02-14 RX ADMIN — ACETAMINOPHEN 975 MG: 325 TABLET, FILM COATED ORAL at 14:09

## 2025-02-14 RX ADMIN — Medication 1 TABLET: at 08:04

## 2025-02-14 RX ADMIN — LIDOCAINE HYDROCHLORIDE 10 ML: 20 SOLUTION ORAL; TOPICAL at 11:45

## 2025-02-14 RX ADMIN — ACETAMINOPHEN 975 MG: 325 TABLET, FILM COATED ORAL at 20:16

## 2025-02-14 RX ADMIN — OXYCODONE 15 MG: 5 TABLET ORAL at 23:51

## 2025-02-14 RX ADMIN — LIDOCAINE 4% 2 PATCH: 40 PATCH TOPICAL at 20:16

## 2025-02-14 RX ADMIN — OXYCODONE 15 MG: 5 TABLET ORAL at 13:07

## 2025-02-14 RX ADMIN — Medication 15 MG: at 08:03

## 2025-02-14 RX ADMIN — ACETAMINOPHEN 975 MG: 325 TABLET, FILM COATED ORAL at 08:03

## 2025-02-14 RX ADMIN — SENNOSIDES AND DOCUSATE SODIUM 1 TABLET: 50; 8.6 TABLET ORAL at 08:04

## 2025-02-14 RX ADMIN — OXYCODONE 15 MG: 5 TABLET ORAL at 04:56

## 2025-02-14 RX ADMIN — OXYCODONE 15 MG: 5 TABLET ORAL at 08:58

## 2025-02-14 RX ADMIN — SENNOSIDES AND DOCUSATE SODIUM 1 TABLET: 50; 8.6 TABLET ORAL at 20:16

## 2025-02-14 RX ADMIN — LIDOCAINE HYDROCHLORIDE 10 ML: 20 SOLUTION ORAL; TOPICAL at 08:04

## 2025-02-14 ASSESSMENT — ACTIVITIES OF DAILY LIVING (ADL)
ADLS_ACUITY_SCORE: 77

## 2025-02-14 NOTE — PROGRESS NOTES
[facility]    Medicine Progress Note - Hospitalist Service, GOLD TEAM 22    Date of Admission:  2/5/2025    Assessment & Plan   Alis Hartman is a 69 year old woman admitted on 2/5/2025. 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    #Acute on Chronic anemia  #Vaginal bleeding  #Gross Hematuria  Likely multifactorial 2/2 malignancy, renal disease, nutrition. Likely hemoconcentrated at admission, down to 7.5 and recent baseline appears near this.  Continues to have bleeding on 12/7 with hemoglobin down to 7.2.  UA with large blood but patient has bright red blood in opening of vagina on exam per WOC nurse.  Therefore, suspect vaginal source.  Vaginal bleeding present on digital exam with minimal tolerability from patient and No overt fungating mass or vaginal abnormality noted, possible nodularity in right vaginal apex vs agglutination per gyn-onc. Continued blood in depends; appears to be in vaginal canal per WOC assessment - GYN-ONC referral placed.  CT A/P with IV contrast ordered to assess for disease recurrence for prognostication discussions given new onset vaginal bleeding - per gyn-onc, likely etiology is atrophic vagina but she has a high risk for recurrence with this histology.  Patient unable to lie flat for CT imaging.  Hgb 5.9; 1 unit of pRBC ordered (consented daughter as patient somnolent from anxiolytic prior to CT scan) on 2/14.  No clear documentation of continued vaginal bleeding since stopping DOAC as of 2/14.    - Hold DOAC for now 2/12 (discussed risk and benefit with  Aranza on 2/12) given vaginal bleeding and anemia  - Monitor for vaginal bleeding; still having intermittent spotting  - Hgb daily; hgb goal >7 (already consented) - stable for a few days  - Will determine need to discuss with urology pending gyn-onc assessment  - If given anxiolytic for CT scan, would give less than IV Ativan 1mg (somnolent following this size dose on 2/13)    # MADHU on CKD3 - resolved  # hyponatremia, likely hypovolemic - resolved    Cr worsened on admission compared to prior, slowly worsening since 1/25. Overall studies suggest prerenal but iniitally didn't improve much with small IVF+ encouraging PO intake but this has been limited. History of  hydronephrosis related to her endometrial cancer s/p PNT removal, L just in December 2024. Cystatin C expectedly lower, GFR estimated to be <30. Hyponatremia has been worsening in the last few weeks in the setting of poor PO intake, now likely prerenal MADHU, poor nutrition. Renal US negative for hydronephrosis. Cr peaked at 1.44, now Cr and electrolytes improving on IV albumin, now almost back to prior baseline.  Suspect prerenal in the setting of low oncotic pressure; responsive to albumin now back to baseline.  Creatinine back to baseline, last albumin was 2/12.   - Cr daily, stable  - 2L fluid restriction but still encouraging her to drink enough  - renal consulted, appreciate recs, have since signed off but should follow up outpatient given CKD     # Chronic bilateral lower extremity edema  # Deconditioning  # Sacral pressure ulcer, POA, worsening   # Severe malnutrition in the context of chronic illness   Hx of chronic edema, no PTA diuretics. Echo 11/24 reassuring EF but poor study, cant comment on diastolic function, edema likely due to some degree of proteinuria and low oncotic pressure with low albumin/malnutrition.  Last hospitalization recommendation was to transitional care unit last hospitalization, however per patient preference and for her  mental health discharged to home with home care and family support in place. Unfortunately symptoms continued to progress at home.  Improved swelling with therapies as below  - Followed now by lymphedema, WOC RN, PT,  RD   - Social work consulted re:placement/discharge planning   - 2L fluid restriction    #Chronic Pain  #?Acute on Chronic Pain  Patient reporting thigh, low back pain.  Continues to report moderate to severe pain at times despite current regimen and recent adjustments.  Per nursing, patient often appears comfortable and then cries when they walk into the room.  Not utilizing IV dilaudid much at this point.  No sedation after 15mg doses.  Previously reported abdominal pain much better.    - Stopped scheduled PO hydromorphone doses given sedation  - Increase oxycodone 15 mg q4h PRN (her home dose)   - Stop IV Dilaudid as needed  - Changed acetaminophen to 975 mg TID     #Hx of ESBL urosepsis and bacteremia  #Bladder dysfunction s/p cystostomy and suprapubic catheter  Recently admitted 11/26 - 12/8/2024 for ESBL urosepsis and bacteremia requiring ICU w/ L nephrostomy tube (removed 1/7) treated w/ ertapenem, returned 1/25-1/27 for concern for UTI but no clear infection at that time.  ID was consulted that admission and recommended avoiding frequent UA and Ucx checks in future unless patient w/ symptoms of UTI.   - UA/Ucx abnormal at admission, likely colonization, will continue to monitor off antibiotics   - may be overdue for suprapubic cath exchange - will look into this - patient reports this was changed about 1.5 weeks ago; will confirm this with daughter as I can't find indication of this in the chart    #Serous endometrial carcinoma  Follows w/ heme/onc through [facility]. S/p SNEHA, BSO 07/2024 s/p cycle 3 carbo/taxel 10/15/2024, cycle 4 delayed in s/o recent admission, family ultimately decided to forgo any further treatment.        Disposition: Home vs. TCU        Diet: Combination Diet Regular Diet  Adult  Snacks/Supplements Adult: Other; Send chocolate Ensure Enlive w/ breakfast, strawberry Ensure+HP with dinner; With Meals  Fluid restriction 2000 ML FLUID    DVT Prophylaxis: Mechanical  Shahid Catheter: Not present  Lines: PRESENT      Port a Cath 02/05/25 Single Lumen Right Chest wall-Site Assessment: WDL      Cardiac Monitoring: None  Code Status: Full Code      Clinically Significant Risk Factors        # Hypokalemia: Lowest K = 3.3 mmol/L in last 2 days, will replace as needed   # Hyperchloremia: Highest Cl = 108 mmol/L in last 2 days, will monitor as appropriate          # Hypoalbuminemia: Lowest albumin = 1.9 g/dL at 2/5/2025  2:52 PM, will monitor as appropriate   # Thrombocytopenia: Lowest platelets = 122 in last 2 days, will monitor for bleeding   # Hypertension: Noted on problem list             # Severe Malnutrition: based on nutrition assessment    # Financial/Environmental Concerns: unable to afford rent/mortgage (pt interested in resources/info on getting assistance paying rent, as well as applying for county benefits)         Social Drivers of Health            Disposition Plan     Medically Ready for Discharge: Anticipate 2 to 3 days     Mukesh Bermudez DO, MPH  Internal Medicine-Pediatrics Hospitalist  Hospitalist Service, GOLD TEAM [address] [place] [address]  Securely message with [email or web address] (more info)  Text page via [facility] Paging/Directory   See signed in provider for up to date coverage information  ______________________________________________________________________    Interval History   Patient seen at bedside this morning.  Patient reports episodes of severe pain to her buttocks and proximal thighs.  She also reports some pain in her back.  During our conversation, she did not report any shortness of breath or chest pain.  Her lower extremity edema is stable.      Physical Exam   Vital Signs: Temp: 98.8  F (37.1  C) Temp src: Oral BP: 127/74 Pulse: 94    Resp: 18 SpO2: 97 % O2 Device: None (Room air)    Weight: 136 lbs 10.96 oz    Gen: Alert, lying in bed, chronically ill appearing  HEENT: Normocephalic/atraumatic, sclera clear, edentulous, oropharynx with mildly dry mucous membranes, stable ulcer on upper and lower middle gums  Resp: Clear bilaterally anteriorly, easy work of breathing on room air, no wheezing, shallow breathing   CV: Regular rate and rhythm, no murmurs noted    Abd: soft, mildly tender, nondistended  Ext: distal LE edema 2+ pitting (stable  Neuro: Alert and oriented x4, grossly non-focal, generalized weakness but moving all extremities equally    Medical Decision Making       Data     I have personally reviewed the following data over the past 24 hrs:    7.6  \   8.8 (L)   / 143 (L)     141 108 (H) 17.9 /  110 (H)   3.4 23 0.71 \

## 2025-02-14 NOTE — PROGRESS NOTES
[facility]    Medicine Progress Note - Hospitalist Service, GOLD TEAM 22    Date of Admission:  2/5/2025    Assessment & Plan   Alis Hartman is a 69 year old woman admitted on 2/5/2025. 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    #Acute on Chronic anemia  #Vaginal bleeding  #Gross Hematuria  Likely multifactorial 2/2 malignancy, renal disease, nutrition. Likely hemoconcentrated at admission, down to 7.5 and recent baseline appears near this.  Continues to have bleeding on 12/7 with hemoglobin down to 7.2.  UA with large blood but patient has bright red blood in opening of vagina on exam per WOC nurse.  Therefore, suspect vaginal source.  Vaginal bleeding present on digital exam with minimal tolerability from patient and No overt fungating mass or vaginal abnormality noted, possible nodularity in right vaginal apex vs agglutination per gyn-onc. Continued blood in depends; appears to be in vaginal canal per WOC assessment - GYN-ONC referral placed.  CT A/P with IV contrast ordered to assess for disease recurrence for prognostication discussions given new onset vaginal bleeding - per gyn-onc, likely etiology is atrophic vagina but she has a high risk for recurrence with this histology.  Patient unable to lie flat for CT imaging.    - Hold DOAC for now 2/12 (discussed risk and benefit with Aranza on 2/12) given vaginal bleeding and anemia  - Hgb 5.9; 1 unit of pRBC ordered (consented daughter as patient somnolent from anxiolytic prior to CT scan; repeat check in AM  - Hgb daily; will assess  transfusion need based on clinical context  - Awaiting gyn-onc input of next steps  - Will determine need to discuss with urology pending gyn-onc assessment  - Palliative consult placed, appreciate their assistance - goals of care discussion planned for 2/14  - If given anxiolytic for CT scan, would give less than IV Ativan 1mg (somnolent following this dose on 2/13)    # MADHU on CKD3 - resolved  # hyponatremia, likely hypovolemic - resolved    Cr worsened on admission compared to prior, slowly worsening since 1/25. Overall studies suggest prerenal but iniitally didn't improve much with small IVF+ encouraging PO intake but this has been limited. History of  hydronephrosis related to her endometrial cancer s/p PNT removal, L just in December 2024. Cystatin C expectedly lower, GFR estimated to be <30. Hyponatremia has been worsening in the last few weeks in the setting of poor PO intake, now likely prerenal MADHU, poor nutrition. Renal US negative for hydronephrosis. Cr peaked at 1.44, now Cr and electrolytes improving on IV albumin, now almost back to prior baseline.  Suspect prerenal in the setting of low oncotic pressure; responsive to albumin now back to baseline.  Creatinine back to baseline, last albumin was 2/12.   - Cr daily  - renal consulted, appreciate recs, have since signed off but should follow up outpatient given CKD     # Chronic bilateral lower extremity edema  # Deconditioning  # Sacral pressure ulcer, POA, worsening   # Severe malnutrition in the context of chronic illness   Hx of chronic edema, no PTA diuretics. Echo 11/24 reassuring EF but poor study, cant comment on diastolic function, edema likely due to some degree of proteinuria and low oncotic pressure with low albumin/malnutrition.  Last hospitalization recommendation was to transitional care unit last hospitalization, however per patient preference and for her mental health discharged to home with home care and family support in place.  Unfortunately symptoms continued to progress at home.  Improved swelling with therapies as below  - Followed now by lymphedema, WOC RN, PT,  RD   - social work consulted re:placement/discharge planning     #Chronic Pain  - Stopped scheduled PO hydromorphone doses given sedation  - Started oxycodone 15 mg q4h PRN (her home dose), hold if sedated  - IV Dilaudid prn available for breakthrough pain  - Changed acetaminophen to 975 mg TID     #Hx of ESBL urosepsis and bacteremia  #Bladder dysfunction s/p cystostomy and suprapubic catheter  Recently admitted 11/26 - 12/8/2024 for ESBL urosepsis and bacteremia requiring ICU w/ L nephrostomy tube (removed 1/7) treated w/ ertapenem, returned 1/25-1/27 for concern for UTI but no clear infection at that time.  ID was consulted that admission and recommended avoiding frequent UA and Ucx checks in future unless patient w/ symptoms of UTI.   - UA/Ucx abnormal at admission, likely colonization, will continue to monitor off antibiotics   - may be overdue for suprapubic cath exchange - will look into this    #Serous endometrial carcinoma  Follows w/ heme/onc through [facility]. S/p SNEHA, BSO 07/2024 s/p cycle 3 carbo/taxel 10/15/2024, cycle 4 delayed in s/o recent admission, family ultimately decided to forgo any further treatment.      Disposition: Will discuss this with daughter over the phone this evening        Diet: Combination Diet Regular Diet Adult  Snacks/Supplements Adult: Other; Send chocolate Ensure Enlive w/ breakfast, strawberry Ensure+HP with dinner; With Meals  Fluid restriction 2000 ML FLUID    DVT Prophylaxis: Mechanical  Shahid Catheter: Not present  Lines: PRESENT      Port a Cath 02/05/25 Single Lumen Right Chest wall-Site Assessment: WDL      Cardiac Monitoring: None  Code Status: Full Code      Clinically Significant Risk Factors        # Hypokalemia: Lowest K = 3.3 mmol/L in last 2 days, will replace as needed   # Hyperchloremia: Highest Cl = 108 mmol/L in last 2  days, will monitor as appropriate          # Hypoalbuminemia: Lowest albumin = 1.9 g/dL at 2/5/2025  2:52 PM, will monitor as appropriate   # Thrombocytopenia: Lowest platelets = 122 in last 2 days, will monitor for bleeding   # Hypertension: Noted on problem list             # Severe Malnutrition: based on nutrition assessment    # Financial/Environmental Concerns: unable to afford rent/mortgage (pt interested in resources/info on getting assistance paying rent, as well as applying for county benefits)         Social Drivers of Health            Disposition Plan     Medically Ready for Discharge: Anticipated in 2-4 Days         Mukesh Bermudez DO, MPH  Internal Medicine-Pediatrics Hospitalist  Hospitalist Service, GOLD TEAM 22  [facility]  Securely message with [employer] (more info)  Text page via [employer] Paging/Directory   See signed in provider for up to date coverage information  ______________________________________________________________________    Interval History   Patient seen at bedside this morning.  History limited given patient's somnolence after benzo given prior to CT scan anxiolytic.    Physical Exam   Vital Signs: Temp: 97.9  F (36.6  C) Temp src: Oral BP: 119/73 Pulse: 98   Resp: 18 SpO2: 100 % O2 Device: None (Room air)    Weight: 136 lbs 10.96 oz    Gen: Somnolent, lying in bed, chronically ill appearing, appears comfortable   HEENT: Normocephalic/atraumatic, sclera clear, edentulous, oropharynx with mildly dry mucous membranes, stable ulcer on upper and lower middle gums  Resp: Clear bilaterally anteriorly, easy work of breathing on room air, no wheezing, shallow breathing   CV: Regular rate and rhythm, no murmurs noted    Abd: soft, diffusely tender, nondistended  Ext: distal LE edema 2+ pitting in thighs   Neuro: Alert and oriented x4, grossly non-focal, generalized weakness but moving all extremities equally    Medical Decision Making       Data      I have personally reviewed the following data over the past 24 hrs:    4.2  \   5.9 (LL)   / 122 (L)     141 108 (H) 20.6 /  79   3.3 (L) 24 0.68 \

## 2025-02-14 NOTE — PROGRESS NOTES
Care Management Follow Up    Length of Stay (days): 9    Expected Discharge Date: 02/17/2025     Concerns to be Addressed: discharge planning     Patient plan of care discussed at interdisciplinary rounds: Yes    Anticipated Discharge Disposition:  (TCU vs Home Care (pt has preference of home))     Anticipated Discharge Services:  (if discharging home, [facility] home care RN/PT/OT)  Anticipated Discharge DME:  (No new DME anticipated)    Patient/family educated on Medicare website which has current facility and service quality ratings: yes  Education Provided on the Discharge Plan: Other (see comments) (plan in progress)  Patient/Family in Agreement with the Plan: yes    Referrals Placed by CM/SW:  (None currently)  Private pay costs discussed: Not applicable    Discussed  Partnership in Safe Discharge Planning  document with patient/family: No     Handoff Completed: No, handoff not indicated or clinically appropriate    Additional Information:    Per IDT meeting, palliative care plans to speak with patient and family re: goals of care. From there, a disposition may be more solidified.     Patient is 2-3 days from medical readiness.    Next Steps:     Watch for results from goals of care discussion. Discuss with IDT what the patient and family decided re: disposition.    If discharging to home:  1) Resume home care services and ensure home care nursing is able to provide wound care/teach family wound care   2) Discuss with therapies/IDT what DME is needed    Call [facility] Care Coordinator to discuss discharge plan.          Mari Hernandez, KATHEW, LSW  8 Med Surg   [facility]  Phone: [phone]  Vocera: Searchable by name or group: 8 Med Surg Vocera

## 2025-02-14 NOTE — PLAN OF CARE
Goal Outcome Evaluation:      Plan of Care Reviewed With: patient    Overall Patient Progress: no changeOverall Patient Progress: no change     Patient is A&O x4. Patient denied chest pain/SOB and dizziness/nausea. Patient c/o back, sacrum, and leg pain; PRN oxycodone given. Patient is incontinent of bowel, LBM 2/14. Patient has a suprapubic catheter in place; draining adequately. Patient had no concerns throughout the shift, is able to make her needs known, and uses call light frequently.     Activity: Assist x2 for repositioning/turns. Not OOB this shift  Diet: Regular/thin/whole. 2000 mL fluid restriction  LDAs: R chest port-a-cath; heparin locked  Skin: Open sores to sacrum and bilateral lower extremities. Wound care done this shift.   Goals for next shift: Pain management and promote rest

## 2025-02-14 NOTE — PLAN OF CARE
Shift: [id number]    Goal Outcome Evaluation:    Plan of Care Reviewed With: patient    Overall Patient Progress: no change      A/O x4    Not OOB this shift    R chest port a cath    Regular diet/thin liquids/meds whole  2000 mL fluid restriction    Incontinent bowel - LBM 2/14 x2  Suprapubic catheter in place    Contact precautions maintained    No acute issues this shift    Patient slept most of the night    Call light within reach, will continue to monitor and follow POC

## 2025-02-14 NOTE — PROGRESS NOTES
CLINICAL NUTRITION SERVICES - REASSESSMENT NOTE     RECOMMENDATIONS FOR MDs/PROVIDERS TO ORDER:  None at this time    Malnutrition Status:    Severe malnutrition in the context of acute on chronic illness    Registered Dietitian Interventions:  Ensure Enlive BID Chocolate and Strawberry  Notified provider of worsening sore under tongue since last week.     Future/Additional Recommendations:  Monitor labs, stooling, weight trends, intakes, supplement acceptance  - monitor status of mouth sores.      SUBJECTIVE INFORMATION  Assessed patient in room. Pt feels pain along gums and under tongue are improving w/ swish and swallow that was ordered by Dental consultant 2/11.  Wound under tongue 3x size than when I first saw on 2/7.    CURRENT NUTRITION ORDERS  Diet: Regular and 2000 mL Fluid Restriction  Ensure Enlive or Ensure+HP BID (chocolate w/ breakfast, strawberry with dinner    CURRENT INTAKE/TOLERANCE  Intake/Tolerance:  limited documentation w/ % intake of meals noted x 5 over past week including 0% x 3 2/14. Ate 50% x 2 earlier in the week.  Appetite noted as low or fair per nursing notes.  Pt states she's eating majority of main entree with meals.   Per [facility] meal order review: Pt has been ordering usually 3 + full meals per day. Earlier in stay was having to order extra trays to try foods that would be easier to chew w/ her sore gums.  With 2 Ensure/d she usually receives > 2000 to > 3000 kcal and 100 to > 130 g protein/day.  On 2/13, she only had one tray and didn't eat all day due to lethargy.    NEW FINDINGS  Weight:   No new weights since admission.  Wt Readings from Last 30 Encounters: Wt method/source   02/05/25 62 kg (136 lb 11 oz) Bed scale   01/25/25 70.8 kg (156 lb) Stated?   01/14/25 70.8 kg (156 lb) Stated?   01/07/25 70.8 kg (156 lb) Stated?   12/30/24 66.2 kg (146 lb) TCU   12/12/24 85.3 kg (188 lb)     12/08/24 85.5 kg (188 lb 7.9 oz) [place], significant anasarca/ 4+ BLE noted   11/09/24  "63.2 kg (139 lb 5.3 oz)     11/04/24 63.5 kg (140 lb) Infusion therapy   10/15/24 63.5 kg (140 lb) \"   10/10/24 63.5 kg (140 lb) Data entered from prior source--no encounter this date   09/27/24 58.8 kg (129 lb 9.6 oz) Out[facility]   09/09/24 68.9 kg (151 lb 12.8 oz) Similar to standing wt from 8/2 during stay   09/04/24 63.5 kg (140 lb) Stated?   08/27/24 63.5 kg (140 lb) Stated?   08/26/24 63.5 kg (140 lb) Stated?   08/22/24 63.5 kg (140 lb) Stated virtual visit   08/20/24 62.3 kg (137 lb 6.4 oz)     08/09/24 67.7 kg (149 lb 3.2 oz)     07/25/24 75 kg (165 lb 6.4 oz)     07/15/24 81.3 kg (179 lb 3.7 oz)     05/28/24 73 kg (161 lb)     05/17/24 74.4 kg (164 lb 0.4 oz)     05/15/24 75.3 kg (166 lb 1.6 oz)     05/15/24 74.8 kg (165 lb)     04/23/24 76.2 kg (168 lb)     04/11/24 76.1 kg (167 lb 12.8 oz)     02/28/24 75.9 kg (167 lb 6.4 oz)     05/23/23 93.3 kg (205 lb 9.6 oz)     02/21/23 98.9 kg (218 lb)        Weight Assessment based on 2/5 wt:  Net wt loss of 18.3% but true loss likely >> 20% with current 3+/4+ BLE  Difficult to  true weight loss within past year as confounded by stated weights and varying levels of edema.     Skin/wounds: per 2/11 WOCN note: Stage 2 PI (was possibly deeper) coccyx (also noted as reinjury 12/4/24 and 1/27/25).  Per photo 2/11: Wound at mid-shin--unroofed blister. Also open area RLE w/ 3+ BLE edema.     GI symptoms: None reported  Last BM: 2/14 soft green, 0-2 BM/d since admission per I/Os. No BM 2/3 until 2/10 per flowsheets.   Bowel regimen: Scheduled--senna-docusate BID    Nutrition-relevant labs: Reviewed    Nutrition-relevant medications: Reviewed    MALNUTRITION  % Intake: Decreased intake does not meet criteria  % Weight Loss:  significant wt loss masked by continued anasarca/3-4+ BLE edema  Subcutaneous Fat Loss: Orbital: Mild  Muscle Loss: Wasting of the temples (temporalis muscle): Mild, Clavicles (pectoralis and deltoids): Severe, Shoulders (deltoids): Severe, " "Interosseous muscles: Mild, and Scapula (latissimus dorsi, trapezious, deltoids): Severe  Fluid Accumulation/Edema: Moderately severe, 3+   Malnutrition Diagnosis: Severe malnutrition in the context of chronic illness  Malnutrition Present on Admission: Yes    EVALUATION OF THE PROGRESS TOWARD GOALS   Previous Goals  Improved renal function  Electrolytes WNL  Patient to consume % of nutritionally adequate meal trays TID, or the equivalent with supplements/snacks.  Evaluation:  1)progressing, 2) progressing 3) progressing    Previous Nutrition Diagnosis  Malnutrition (undernutrition) related to h/o poor intake w/ increased needs w/ cancer dx as evidenced by severe muscle wasting and severe BLE edema.   Evaluation: No change    NUTRITION DIAGNOSIS  Malnutrition (undernutrition) related to h/o poor intake w/ increased needs w/ cancer dx as evidenced by severe muscle wasting and severe BLE edema.      INTERVENTIONS  See nutrition interventions above    Goals  Patient to consume % of nutritionally adequate meal trays TID, or the equivalent with supplements/snacks.     Monitoring/Evaluation      Progress toward goals will be monitored and evaluated per policy.    Eli Castañeda RD, LD   6 & 8 Med/Surg RD  Mon-Fri Vocera: \"6 Med Surg Clinical Dietitian\" or \"8 Med Surg Clinical Dietitian\"  Weekend RD Vocera (Global): \"Weekend Holiday Clinical Dietitian\"          "

## 2025-02-15 LAB
ANION GAP SERPL CALCULATED.3IONS-SCNC: 11 MMOL/L (ref 7–15)
BUN SERPL-MCNC: 16.9 MG/DL (ref 8–23)
CALCIUM SERPL-MCNC: 8.7 MG/DL (ref 8.8–10.4)
CHLORIDE SERPL-SCNC: 105 MMOL/L (ref 98–107)
CREAT SERPL-MCNC: 0.74 MG/DL (ref 0.51–0.95)
CYSTATIN C (ROCHE): 1.5 MG/L (ref 0.6–1)
EGFRCR SERPLBLD CKD-EPI 2021: 86 ML/MIN/1.73M2
ERYTHROCYTE [DISTWIDTH] IN BLOOD BY AUTOMATED COUNT: 19.1 % (ref 10–15)
FERRITIN SERPL-MCNC: 270 NG/ML (ref 11–328)
FOLATE SERPL-MCNC: 16.7 NG/ML (ref 4.6–34.8)
GFR/BSA.PRED SERPLBLD CYS-BASED-ARV: 40 ML/MIN/1.73M2
GLUCOSE SERPL-MCNC: 85 MG/DL (ref 70–99)
HCO3 SERPL-SCNC: 23 MMOL/L (ref 22–29)
HCT VFR BLD AUTO: 25.6 % (ref 35–47)
HGB BLD-MCNC: 8.7 G/DL (ref 11.7–15.7)
IRON BINDING CAPACITY (ROCHE): 40 UG/DL (ref 240–430)
IRON SATN MFR SERPL: 43 % (ref 15–46)
IRON SERPL-MCNC: 17 UG/DL (ref 37–145)
MCH RBC QN AUTO: 29.7 PG (ref 26.5–33)
MCHC RBC AUTO-ENTMCNC: 34 G/DL (ref 31.5–36.5)
MCV RBC AUTO: 87 FL (ref 78–100)
PLATELET # BLD AUTO: 152 10E3/UL (ref 150–450)
POTASSIUM SERPL-SCNC: 3.5 MMOL/L (ref 3.4–5.3)
RBC # BLD AUTO: 2.93 10E6/UL (ref 3.8–5.2)
SODIUM SERPL-SCNC: 139 MMOL/L (ref 135–145)
TRANSFERRIN SERPL-MCNC: 32.1 MG/DL (ref 200–360)
VIT B12 SERPL-MCNC: 1144 PG/ML (ref 232–1245)
WBC # BLD AUTO: 6.9 10E3/UL (ref 4–11)

## 2025-02-15 PROCEDURE — 80048 BASIC METABOLIC PNL TOTAL CA: CPT | Performed by: STUDENT IN AN ORGANIZED HEALTH CARE EDUCATION/TRAINING PROGRAM

## 2025-02-15 PROCEDURE — 83550 IRON BINDING TEST: CPT | Performed by: STUDENT IN AN ORGANIZED HEALTH CARE EDUCATION/TRAINING PROGRAM

## 2025-02-15 PROCEDURE — 82728 ASSAY OF FERRITIN: CPT | Performed by: STUDENT IN AN ORGANIZED HEALTH CARE EDUCATION/TRAINING PROGRAM

## 2025-02-15 PROCEDURE — 82435 ASSAY OF BLOOD CHLORIDE: CPT | Performed by: STUDENT IN AN ORGANIZED HEALTH CARE EDUCATION/TRAINING PROGRAM

## 2025-02-15 PROCEDURE — 250N000013 HC RX MED GY IP 250 OP 250 PS 637: Performed by: CLINICAL NURSE SPECIALIST

## 2025-02-15 PROCEDURE — 85014 HEMATOCRIT: CPT | Performed by: STUDENT IN AN ORGANIZED HEALTH CARE EDUCATION/TRAINING PROGRAM

## 2025-02-15 PROCEDURE — 36591 DRAW BLOOD OFF VENOUS DEVICE: CPT | Performed by: STUDENT IN AN ORGANIZED HEALTH CARE EDUCATION/TRAINING PROGRAM

## 2025-02-15 PROCEDURE — 82610 CYSTATIN C: CPT | Performed by: STUDENT IN AN ORGANIZED HEALTH CARE EDUCATION/TRAINING PROGRAM

## 2025-02-15 PROCEDURE — 250N000013 HC RX MED GY IP 250 OP 250 PS 637: Performed by: INTERNAL MEDICINE

## 2025-02-15 PROCEDURE — 250N000013 HC RX MED GY IP 250 OP 250 PS 637: Performed by: STUDENT IN AN ORGANIZED HEALTH CARE EDUCATION/TRAINING PROGRAM

## 2025-02-15 PROCEDURE — 120N000002 HC R&B MED SURG/OB UMMC

## 2025-02-15 PROCEDURE — 250N000009 HC RX 250: Performed by: STUDENT IN AN ORGANIZED HEALTH CARE EDUCATION/TRAINING PROGRAM

## 2025-02-15 PROCEDURE — 85041 AUTOMATED RBC COUNT: CPT | Performed by: STUDENT IN AN ORGANIZED HEALTH CARE EDUCATION/TRAINING PROGRAM

## 2025-02-15 PROCEDURE — 83540 ASSAY OF IRON: CPT | Performed by: STUDENT IN AN ORGANIZED HEALTH CARE EDUCATION/TRAINING PROGRAM

## 2025-02-15 PROCEDURE — 82746 ASSAY OF FOLIC ACID SERUM: CPT | Performed by: STUDENT IN AN ORGANIZED HEALTH CARE EDUCATION/TRAINING PROGRAM

## 2025-02-15 PROCEDURE — 250N000011 HC RX IP 250 OP 636: Performed by: INTERNAL MEDICINE

## 2025-02-15 PROCEDURE — 250N000011 HC RX IP 250 OP 636: Performed by: STUDENT IN AN ORGANIZED HEALTH CARE EDUCATION/TRAINING PROGRAM

## 2025-02-15 PROCEDURE — 84466 ASSAY OF TRANSFERRIN: CPT | Performed by: STUDENT IN AN ORGANIZED HEALTH CARE EDUCATION/TRAINING PROGRAM

## 2025-02-15 PROCEDURE — 82607 VITAMIN B-12: CPT | Performed by: STUDENT IN AN ORGANIZED HEALTH CARE EDUCATION/TRAINING PROGRAM

## 2025-02-15 PROCEDURE — 99233 SBSQ HOSP IP/OBS HIGH 50: CPT | Performed by: STUDENT IN AN ORGANIZED HEALTH CARE EDUCATION/TRAINING PROGRAM

## 2025-02-15 RX ORDER — OXYCODONE HYDROCHLORIDE 10 MG/1
20 TABLET ORAL EVERY 4 HOURS PRN
Status: DISPENSED | OUTPATIENT
Start: 2025-02-15

## 2025-02-15 RX ADMIN — LIDOCAINE HYDROCHLORIDE 10 ML: 20 SOLUTION ORAL; TOPICAL at 12:35

## 2025-02-15 RX ADMIN — OXYCODONE 15 MG: 5 TABLET ORAL at 05:06

## 2025-02-15 RX ADMIN — LIDOCAINE HYDROCHLORIDE 10 ML: 20 SOLUTION ORAL; TOPICAL at 08:28

## 2025-02-15 RX ADMIN — Medication 1 TABLET: at 08:26

## 2025-02-15 RX ADMIN — OXYCODONE 15 MG: 5 TABLET ORAL at 09:09

## 2025-02-15 RX ADMIN — Medication 15 MG: at 08:27

## 2025-02-15 RX ADMIN — LIDOCAINE 4% 2 PATCH: 40 PATCH TOPICAL at 20:44

## 2025-02-15 RX ADMIN — OXYCODONE HYDROCHLORIDE 20 MG: 10 TABLET ORAL at 20:39

## 2025-02-15 RX ADMIN — OXYCODONE HYDROCHLORIDE 20 MG: 10 TABLET ORAL at 16:29

## 2025-02-15 RX ADMIN — Medication 5 ML: at 12:46

## 2025-02-15 RX ADMIN — OXYCODONE 15 MG: 5 TABLET ORAL at 13:36

## 2025-02-15 RX ADMIN — ACETAMINOPHEN 975 MG: 325 TABLET, FILM COATED ORAL at 08:26

## 2025-02-15 RX ADMIN — ACETAMINOPHEN 975 MG: 325 TABLET, FILM COATED ORAL at 13:37

## 2025-02-15 RX ADMIN — SENNOSIDES AND DOCUSATE SODIUM 1 TABLET: 50; 8.6 TABLET ORAL at 08:26

## 2025-02-15 RX ADMIN — SENNOSIDES AND DOCUSATE SODIUM 1 TABLET: 50; 8.6 TABLET ORAL at 20:39

## 2025-02-15 RX ADMIN — Medication 5 ML: at 13:37

## 2025-02-15 RX ADMIN — ACETAMINOPHEN 975 MG: 325 TABLET, FILM COATED ORAL at 20:39

## 2025-02-15 RX ADMIN — LIDOCAINE HYDROCHLORIDE 10 ML: 20 SOLUTION ORAL; TOPICAL at 16:30

## 2025-02-15 ASSESSMENT — ACTIVITIES OF DAILY LIVING (ADL)
ADLS_ACUITY_SCORE: 73
ADLS_ACUITY_SCORE: 68
ADLS_ACUITY_SCORE: 73
ADLS_ACUITY_SCORE: 73
ADLS_ACUITY_SCORE: 72
ADLS_ACUITY_SCORE: 73
ADLS_ACUITY_SCORE: 72
ADLS_ACUITY_SCORE: 73
ADLS_ACUITY_SCORE: 72
ADLS_ACUITY_SCORE: 72
ADLS_ACUITY_SCORE: 73
ADLS_ACUITY_SCORE: 72
ADLS_ACUITY_SCORE: 73
ADLS_ACUITY_SCORE: 73

## 2025-02-15 NOTE — PROGRESS NOTES
Shift [id number]:Pt admitted on 2/2/25 with MADHU,Hyponatremia, worsening BLE edema and progressive coccyx sores  Summary:Pt will return to TCU  VS:       Pt A/O X 4. Afebrile. VS'S. Lungs- Clear bilaterally Denies nausea, shortness of breath, and chest pain.     Output:       Bowels- + in all four quadrants Last BM 2/12/25.Pt has adequate output from his SP catheter .      Activity:       Pt  not OOB Uses the bedpan .  Repositioned every two hours, pt is refusing  Assist of 2 with repositioning and cares   Skin:   Wounds in the coccyx/sacrum and groin Scattered open wounds to BLE Severe edema to BLE-Lymphedema wraps removed yesterday and pt refused to have them put back on     Pain:       Has pain in the Coccyx and is managed with Dilaudid every three hours .      CMS:       CMS and Neuro's are intact. Denies numbness and tingling in all extremities.      Dressing:     Dressings CDI.   Mepilex to BLE. Bilateral elbows and Coccyx   Diet:       Pt is on a Regular diet  Pt is on a 2000 fluid restriction     LDA:       Pt has a Rachael cath_Hep Locked.   Dressing change due 2/12/2   Equipment:        Bilateral heels are elevated off the bed.     Plan:       Pt is able to make needs known and the call light is within the pt's reach. Continue to monitor.       Additional Info:        .Pt needs a CT last night but she refused it and will do in the morning

## 2025-02-15 NOTE — PLAN OF CARE
Goal Outcome Evaluation:      Plan of Care Reviewed With: patient    Overall Patient Progress: no changeOverall Patient Progress: no change    Pt is alert and oriented x 4 and able to make her needs known, regular diet thin liquids, takes medications whole with water, c/o coccyx pain and rated pain on 10/10,  Oxycodone x 3 administered with some help, new order for PRN oxycodone, dose is increased, wounds to bilateral legs/hips and bilateral elbow cleansed and new dressings applied, pt continues  to cry for pain, urine sample ordered for  labs, suprapubic bag changed; no urine obtained yet for sample, fluid encouraged,  will continue plan of care

## 2025-02-16 LAB
ALBUMIN UR-MCNC: 30 MG/DL
ANION GAP SERPL CALCULATED.3IONS-SCNC: 11 MMOL/L (ref 7–15)
APPEARANCE UR: ABNORMAL
BACTERIA #/AREA URNS HPF: ABNORMAL /HPF
BILIRUB UR QL STRIP: NEGATIVE
BUN SERPL-MCNC: 16.5 MG/DL (ref 8–23)
CALCIUM SERPL-MCNC: 8.4 MG/DL (ref 8.8–10.4)
CHLORIDE SERPL-SCNC: 106 MMOL/L (ref 98–107)
COLOR UR AUTO: YELLOW
CREAT SERPL-MCNC: 0.71 MG/DL (ref 0.51–0.95)
EGFRCR SERPLBLD CKD-EPI 2021: 90 ML/MIN/1.73M2
ERYTHROCYTE [DISTWIDTH] IN BLOOD BY AUTOMATED COUNT: 19.4 % (ref 10–15)
GLUCOSE SERPL-MCNC: 82 MG/DL (ref 70–99)
GLUCOSE UR STRIP-MCNC: NEGATIVE MG/DL
HCO3 SERPL-SCNC: 22 MMOL/L (ref 22–29)
HCT VFR BLD AUTO: 25.8 % (ref 35–47)
HGB BLD-MCNC: 8.7 G/DL (ref 11.7–15.7)
HGB UR QL STRIP: ABNORMAL
HYALINE CASTS: 3 /LPF
KETONES UR STRIP-MCNC: NEGATIVE MG/DL
LEUKOCYTE ESTERASE UR QL STRIP: ABNORMAL
MCH RBC QN AUTO: 29.8 PG (ref 26.5–33)
MCHC RBC AUTO-ENTMCNC: 33.7 G/DL (ref 31.5–36.5)
MCV RBC AUTO: 88 FL (ref 78–100)
MUCOUS THREADS #/AREA URNS LPF: PRESENT /LPF
NITRATE UR QL: POSITIVE
PH UR STRIP: 5.5 [PH] (ref 5–7)
PLATELET # BLD AUTO: 171 10E3/UL (ref 150–450)
POTASSIUM SERPL-SCNC: 3.3 MMOL/L (ref 3.4–5.3)
RBC # BLD AUTO: 2.92 10E6/UL (ref 3.8–5.2)
RBC URINE: 41 /HPF
SODIUM SERPL-SCNC: 139 MMOL/L (ref 135–145)
SP GR UR STRIP: 1.02 (ref 1–1.03)
SQUAMOUS EPITHELIAL: <1 /HPF
UROBILINOGEN UR STRIP-MCNC: NORMAL MG/DL
WBC # BLD AUTO: 7.9 10E3/UL (ref 4–11)
WBC CLUMPS #/AREA URNS HPF: PRESENT /HPF
WBC URINE: >182 /HPF
YEAST #/AREA URNS HPF: ABNORMAL /HPF

## 2025-02-16 PROCEDURE — 250N000009 HC RX 250: Performed by: STUDENT IN AN ORGANIZED HEALTH CARE EDUCATION/TRAINING PROGRAM

## 2025-02-16 PROCEDURE — 250N000013 HC RX MED GY IP 250 OP 250 PS 637: Performed by: INTERNAL MEDICINE

## 2025-02-16 PROCEDURE — 99233 SBSQ HOSP IP/OBS HIGH 50: CPT | Performed by: STUDENT IN AN ORGANIZED HEALTH CARE EDUCATION/TRAINING PROGRAM

## 2025-02-16 PROCEDURE — 250N000009 HC RX 250: Performed by: INTERNAL MEDICINE

## 2025-02-16 PROCEDURE — 250N000011 HC RX IP 250 OP 636: Performed by: STUDENT IN AN ORGANIZED HEALTH CARE EDUCATION/TRAINING PROGRAM

## 2025-02-16 PROCEDURE — 36591 DRAW BLOOD OFF VENOUS DEVICE: CPT | Performed by: STUDENT IN AN ORGANIZED HEALTH CARE EDUCATION/TRAINING PROGRAM

## 2025-02-16 PROCEDURE — 250N000013 HC RX MED GY IP 250 OP 250 PS 637: Performed by: STUDENT IN AN ORGANIZED HEALTH CARE EDUCATION/TRAINING PROGRAM

## 2025-02-16 PROCEDURE — 81001 URINALYSIS AUTO W/SCOPE: CPT | Performed by: STUDENT IN AN ORGANIZED HEALTH CARE EDUCATION/TRAINING PROGRAM

## 2025-02-16 PROCEDURE — 250N000013 HC RX MED GY IP 250 OP 250 PS 637: Performed by: CLINICAL NURSE SPECIALIST

## 2025-02-16 PROCEDURE — 80048 BASIC METABOLIC PNL TOTAL CA: CPT | Performed by: STUDENT IN AN ORGANIZED HEALTH CARE EDUCATION/TRAINING PROGRAM

## 2025-02-16 PROCEDURE — 85041 AUTOMATED RBC COUNT: CPT | Performed by: STUDENT IN AN ORGANIZED HEALTH CARE EDUCATION/TRAINING PROGRAM

## 2025-02-16 PROCEDURE — 120N000002 HC R&B MED SURG/OB UMMC

## 2025-02-16 PROCEDURE — 85014 HEMATOCRIT: CPT | Performed by: STUDENT IN AN ORGANIZED HEALTH CARE EDUCATION/TRAINING PROGRAM

## 2025-02-16 RX ORDER — FERROUS SULFATE 7.5 MG/0.5
15 SYRINGE (EA) ORAL DAILY
Status: DISPENSED | OUTPATIENT
Start: 2025-02-17

## 2025-02-16 RX ORDER — FERROUS SULFATE 7.5 MG/0.5
30 SYRINGE (EA) ORAL DAILY
Status: DISCONTINUED | OUTPATIENT
Start: 2025-02-17 | End: 2025-02-16

## 2025-02-16 RX ADMIN — ACETAMINOPHEN 975 MG: 325 TABLET, FILM COATED ORAL at 13:12

## 2025-02-16 RX ADMIN — LIDOCAINE HYDROCHLORIDE 10 ML: 20 SOLUTION ORAL; TOPICAL at 09:14

## 2025-02-16 RX ADMIN — OXYCODONE HYDROCHLORIDE 20 MG: 10 TABLET ORAL at 05:43

## 2025-02-16 RX ADMIN — OXYCODONE HYDROCHLORIDE 20 MG: 10 TABLET ORAL at 00:53

## 2025-02-16 RX ADMIN — LIDOCAINE HYDROCHLORIDE 10 ML: 20 SOLUTION ORAL; TOPICAL at 11:45

## 2025-02-16 RX ADMIN — ACETAMINOPHEN 975 MG: 325 TABLET, FILM COATED ORAL at 09:08

## 2025-02-16 RX ADMIN — Medication: at 17:13

## 2025-02-16 RX ADMIN — OXYCODONE HYDROCHLORIDE 20 MG: 10 TABLET ORAL at 17:11

## 2025-02-16 RX ADMIN — LIDOCAINE HYDROCHLORIDE 10 ML: 20 SOLUTION ORAL; TOPICAL at 17:12

## 2025-02-16 RX ADMIN — LIDOCAINE 4% 2 PATCH: 40 PATCH TOPICAL at 19:55

## 2025-02-16 RX ADMIN — SENNOSIDES AND DOCUSATE SODIUM 1 TABLET: 50; 8.6 TABLET ORAL at 19:54

## 2025-02-16 RX ADMIN — Medication: at 23:47

## 2025-02-16 RX ADMIN — ACETAMINOPHEN 975 MG: 325 TABLET, FILM COATED ORAL at 19:54

## 2025-02-16 RX ADMIN — SENNOSIDES AND DOCUSATE SODIUM 1 TABLET: 50; 8.6 TABLET ORAL at 09:08

## 2025-02-16 RX ADMIN — OXYCODONE HYDROCHLORIDE 20 MG: 10 TABLET ORAL at 10:16

## 2025-02-16 RX ADMIN — Medication 5 ML: at 13:14

## 2025-02-16 RX ADMIN — OXYCODONE HYDROCHLORIDE 20 MG: 10 TABLET ORAL at 21:33

## 2025-02-16 RX ADMIN — Medication 1 TABLET: at 09:08

## 2025-02-16 RX ADMIN — Medication 15 MG: at 09:14

## 2025-02-16 RX ADMIN — LIDOCAINE HYDROCHLORIDE 10 ML: 20 SOLUTION ORAL; TOPICAL at 23:47

## 2025-02-16 ASSESSMENT — ACTIVITIES OF DAILY LIVING (ADL)
ADLS_ACUITY_SCORE: 68
ADLS_ACUITY_SCORE: 70
ADLS_ACUITY_SCORE: 72
ADLS_ACUITY_SCORE: 68
ADLS_ACUITY_SCORE: 70
ADLS_ACUITY_SCORE: 68

## 2025-02-16 NOTE — PLAN OF CARE
Goal Outcome Evaluation:      Plan of Care Reviewed With: patient    Overall Patient Progress: improvingOverall Patient Progress: improving  VS: Temp: 97.3  F (36.3  C) Temp src: Oral BP: (!) 138/104 Pulse: 87   Resp: 16 SpO2: 97 % O2 Device: None (Room air)      O2: Sating >90% on RA. Lung sounds clear. Denies chest pain and SOB.   Output: Voids spontaneously and adequately.    Last BM: 2/15/25. Bowel sounds active x4. Passing flatus.    Activity: Not oob   Skin: Coccyx wounds, Bolateral legs wound CDI   Pain: Pain was managed with PRN oxycodone and scheduled Tylenol.    CMS: A&Ox4. Denies N/T.    Dressing: Dressing to Coccyx C/D/I.   Diet: Regular. Appetite was good. Denies N/V.    LDA: Port-A-Cart   Equipment: IV pole, FWW, gait belt, and personal belongings. Call light within reach and uses appropriately.   Plan:  Pain management, Continue with POC   Additional Info:      2/

## 2025-02-16 NOTE — PROGRESS NOTES
Patient is alert and oriented. Tearful / crying in AM. PRN oxy given for pain. Patient in better spirits in afternoon.     Patient refused repositioning in AM but allowed it in afternoon.     Edema stockings applied in AM but removed in afternoon per patient request.     Sacral wound checked per POC. Dressing CDI.     Vital signs this shift B/P: 135/84, T: 99.1, P: 107, R: 18   Patient is able to make needs known, uses the call light appropriately. Continue with the plan of care.        Admission Reconciliation is Completed  Discharge Reconciliation is Not Complete Admission Reconciliation is Completed  Discharge Reconciliation is Completed

## 2025-02-16 NOTE — PROGRESS NOTES
[facility]    Medicine Progress Note - Hospitalist Service, GOLD TEAM 22    Date of Admission:  2/5/2025    Assessment & Plan   Alis Hartman is a 69 year old woman admitted on 2/5/2025. 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    #Acute on Chronic anemia  #Vaginal bleeding  #Gross Hematuria  Likely multifactorial 2/2 malignancy, renal disease, nutrition. Likely hemoconcentrated at admission, down to 7.5 and recent baseline appears near this.  Continues to have bleeding on 12/7 with hemoglobin down to 7.2.  UA with large blood but patient has bright red blood in opening of vagina on exam per WOC nurse.  Therefore, suspect vaginal source.  Vaginal bleeding present on digital exam with minimal tolerability from patient and No overt fungating mass or vaginal abnormality noted, possible nodularity in right vaginal apex vs agglutination per gyn-onc. Continued blood in depends; appears to be in vaginal canal per WOC assessment - GYN-ONC referral placed.  CT A/P with IV contrast ordered to assess for disease recurrence for prognostication discussions given new onset vaginal bleeding - per gyn-onc, likely etiology is atrophic vagina but she has a high risk for recurrence with this histology.  Patient unable to lie flat for CT imaging.  Hgb 5.9; 1 unit of pRBC ordered (consented daughter as patient somnolent from anxiolytic prior to CT scan) on 2/14.  No clear documentation of continued vaginal bleeding since stopping DOAC as of 2/14.    - Hold DOAC for now 2/12 (discussed risk and benefit with  Aranza on 2/12) given vaginal bleeding and anemia  - Monitor for vaginal bleeding; still having intermittent spotting  - Hgb daily; hgb goal >7 (already consented) - stable for a few days  - Will determine need to discuss with urology pending gyn-onc assessment  - If given anxiolytic for CT scan, would give less than IV Ativan 1mg (somnolent following this size dose on 2/13)  - Iron studies, B12, folate to rule out multifactorial cause  - Will repeat UA from suprapubic catheter to assess whether hematuria continues; will discuss with urology tomorrow if persistent    # MADHU on CKD3 - resolved  # hyponatremia, likely hypovolemic - resolved    Cr worsened on admission compared to prior, slowly worsening since 1/25. Overall studies suggest prerenal but iniitally didn't improve much with small IVF+ encouraging PO intake but this has been limited. History of  hydronephrosis related to her endometrial cancer s/p PNT removal, L just in December 2024. Cystatin C expectedly lower, GFR estimated to be <30. Hyponatremia has been worsening in the last few weeks in the setting of poor PO intake, now likely prerenal MADHU, poor nutrition. Renal US negative for hydronephrosis. Cr peaked at 1.44, now Cr and electrolytes improving on IV albumin, now almost back to prior baseline.  Suspect prerenal in the setting of low oncotic pressure; responsive to albumin now back to baseline.  Creatinine back to baseline, last albumin was 2/12.   - Cr daily, stable  - 2L fluid restriction but still encouraging her to drink enough; has had good intake  - renal consulted, appreciate recs, have since signed off but should follow up outpatient given CKD     # Chronic bilateral lower extremity edema  # Deconditioning  # Sacral pressure ulcer, POA, worsening   # Severe malnutrition in the context of chronic illness   Hx of chronic edema, no PTA diuretics. Echo 11/24 reassuring EF but poor study, cant comment on diastolic function, edema likely due to some  degree of proteinuria and low oncotic pressure with low albumin/malnutrition.  Last hospitalization recommendation was to transitional care unit last hospitalization, however per patient preference and for her mental health discharged to home with home care and family support in place. Unfortunately symptoms continued to progress at home.  Improved swelling with therapies as below  - Followed now by lymphedema, WOC RN, PT,  RD   - Social work consulted re:placement/discharge planning   - 2L fluid restriction    #Chronic Pain  #?Acute on Chronic Pain  Patient reporting thigh, low back pain.  Continues to report moderate to severe pain at times despite current regimen and recent adjustments.  Per nursing, patient often appears comfortable and then cries when they walk into the room.  Not utilizing IV dilaudid much at this point.  No sedation after 15mg doses.  Previously reported abdominal pain much better.  Sacral and thigh pain still uncontrolled per patient report.  - Stopped scheduled PO hydromorphone doses given sedation  - Increase oxycodone 15 mg q4h PRN (her home dose) --> 20mg q4h prn after discussion with palliative care  - Stop IV Dilaudid as needed  - Changed acetaminophen to 975 mg TID     #Hx of ESBL urosepsis and bacteremia  #Bladder dysfunction s/p cystostomy and suprapubic catheter  Recently admitted 11/26 - 12/8/2024 for ESBL urosepsis and bacteremia requiring ICU w/ L nephrostomy tube (removed 1/7) treated w/ ertapenem, returned 1/25-1/27 for concern for UTI but no clear infection at that time.  ID was consulted that admission and recommended avoiding frequent UA and Ucx checks in future unless patient w/ symptoms of UTI.   - UA/Ucx abnormal at admission, likely colonization, will continue to monitor off antibiotics   - may be overdue for suprapubic cath exchange - will look into this - patient reports this was changed about 1.5 weeks ago; will confirm this with daughter as I can't find indication  of this in the chart    #Serous endometrial carcinoma  Follows w/ heme/onc through [facility]. S/p SNEHA, BSO 07/2024 s/p cycle 3 carbo/taxel 10/15/2024, cycle 4 delayed in s/o recent admission, family ultimately decided to forgo any further treatment.     Will discuss etiology of mouth sores with dental in a.m.     Disposition: Home vs. TCU        Diet: Combination Diet Regular Diet Adult  Snacks/Supplements Adult: Other; Send chocolate Ensure Enlive w/ breakfast, strawberry Ensure+HP with dinner; With Meals  Fluid restriction 2000 ML FLUID    DVT Prophylaxis: Mechanical  Shahid Catheter: Not present  Lines: PRESENT      Port a Cath 02/05/25 Single Lumen Right Chest wall-Site Assessment: WDL      Cardiac Monitoring: None  Code Status: Full Code      Clinically Significant Risk Factors          # Hyperchloremia: Highest Cl = 108 mmol/L in last 2 days, will monitor as appropriate          # Hypoalbuminemia: Lowest albumin = 1.9 g/dL at 2/5/2025  2:52 PM, will monitor as appropriate     # Hypertension: Noted on problem list             # Moderate Malnutrition: based on nutrition assessment    # Financial/Environmental Concerns: unable to afford rent/mortgage (pt interested in resources/info on getting assistance paying rent, as well as applying for county benefits)         Social Drivers of Health            Disposition Plan     Medically Ready for Discharge: Anticipate 2 to 3 days; suspect home discharge with increased supports although possible TCU placement     Mukesh Bermudez DO, MPH  Internal Medicine-Pediatrics Hospitalist  Hospitalist Service, GOLD TEAM 22  [facility]  Securely message with [employer] (more info)  Text page via [employer] Paging/Directory   See signed in provider for up to date coverage information  ______________________________________________________________________    Interval History   Patient seen at bedside this morning.  Patient reports episodes of  severe pain to her buttocks and proximal thighs.  She also reports some pain in her back.  During our conversation, she did not report any shortness of breath or chest pain.  Her lower extremity edema is stable.  She reports good intake.    Physical Exam   Vital Signs: Temp: 98.2  F (36.8  C) Temp src: Oral BP: 122/79 Pulse: 83   Resp: 18 SpO2: 100 % O2 Device: None (Room air)    Weight: 136 lbs 10.96 oz    Gen: Alert, lying in bed, chronically ill appearing  HEENT: Normocephalic/atraumatic, sclera clear, edentulous, oropharynx with mildly dry mucous membranes, stable ulcer on upper and lower middle gums  Resp: Clear bilaterally anteriorly, easy work of breathing on room air, no wheezing, shallow breathing   CV: Regular rate and rhythm, no murmurs noted    Abd: soft, mildly tender, nondistended  Ext: distal LE edema 2+ pitting (stable  Neuro: Alert and oriented x4, grossly non-focal, generalized weakness but moving all extremities equally    Medical Decision Making       Data     I have personally reviewed the following data over the past 24 hrs:    6.9  \   8.7 (L)   / 152     139 105 16.9 /  85   3.5 23 0.74 \     Ferritin:  270 % Retic:  N/A LDH:  N/A

## 2025-02-16 NOTE — PROGRESS NOTES
[facility]    Medicine Progress Note - Hospitalist Service, GOLD TEAM 22    Date of Admission:  2/5/2025    Assessment & Plan   Alis Hartman is a 69 year old woman admitted on 2/5/2025. 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    See conversation with daughter on the phone below    #Acute on Chronic anemia (multifactorial)  #Vaginal bleeding  #Gross Hematuria  #Iron Deficiency Anemia  Likely multifactorial 2/2 malignancy, renal disease, nutrition. Likely hemoconcentrated at admission, down to 7.5 and recent baseline appears near this.  Continues to have bleeding on 12/7 with hemoglobin down to 7.2.  UA with large blood but patient has bright red blood in opening of vagina on exam per WOC nurse.  Therefore, suspect vaginal source.  Vaginal bleeding present on digital exam with minimal tolerability from patient and No overt fungating mass or vaginal abnormality noted, possible nodularity in right vaginal apex vs agglutination per gyn-onc. Continued blood in depends; appears to be in vaginal canal per WOC assessment - GYN-ONC referral placed.  CT A/P with IV contrast ordered to assess for disease recurrence for prognostication discussions given new onset vaginal bleeding - per gyn-onc, likely etiology is atrophic vagina but she has a high risk for recurrence with this histology.  Patient unable to lie flat for CT imaging.  Hgb 5.9; 1 unit of pRBC ordered (consented daughter as patient somnolent from anxiolytic prior to CT scan) on 2/14.  No clear documentation of continued vaginal bleeding  since stopping DOAC as of 2/14.  Normal ferritin on while iron.  Iron studies possibly consistent with some initial iron deficiency with anemia of CKD/chronic disease.  Folate and B12 wnl.    - Hold DOAC for now 2/12 (discussed risk and benefit with Aranza on 2/12) given vaginal bleeding and anemia  - Monitor for vaginal bleeding; still having intermittent spotting  - Hgb daily; hgb goal >7 (already consented) - stable for multiple days  - Continue Ferrous Sulfate drops daily  - Will discuss persistent blood in UA with urology tomorrow 2/17    # MADHU on CKD3 - resolved  # hyponatremia, likely hypovolemic - resolved    Cr worsened on admission compared to prior, slowly worsening since 1/25. Overall studies suggest prerenal but iniitally didn't improve much with small IVF+ encouraging PO intake but this has been limited. History of  hydronephrosis related to her endometrial cancer s/p PNT removal, L just in December 2024. Cystatin C expectedly lower, GFR estimated to be <30. Hyponatremia has been worsening in the last few weeks in the setting of poor PO intake, now likely prerenal MADHU, poor nutrition. Renal US negative for hydronephrosis. Cr peaked at 1.44, now Cr and electrolytes improving on IV albumin, now almost back to prior baseline.  Suspect prerenal in the setting of low oncotic pressure; responsive to albumin now back to baseline.  Creatinine back to baseline, last albumin was 2/12. Stable creatinine without albumin for many days with stable lower extremity edema on 2 L fluid restriction.  - Cr daily, stable  - 2L fluid restriction but still encouraging her to drink enough; has had good intake  - renal consulted, appreciate recs, have since signed off but should follow up outpatient given CKD     # Chronic bilateral lower extremity edema  # Deconditioning  # Sacral pressure ulcer, POA, worsening   # Severe malnutrition in the context of chronic illness   Hx of chronic edema, no PTA diuretics. Echo 11/24  reassuring EF but poor study, cant comment on diastolic function, edema likely due to some degree of proteinuria and low oncotic pressure with low albumin/malnutrition.  Last hospitalization recommendation was to transitional care unit last hospitalization, however per patient preference and for her mental health discharged to home with home care and family support in place. Unfortunately symptoms continued to progress at home.  Improved swelling with therapies as below  - Followed now by lymphedema, WOC RN, PT,  RD   - Social work consulted re:placement/discharge planning   - 2L fluid restriction  - Will relay daughter's encourage to PT and OT to call daughter if patient is not participating in therapies  - Will discuss wound care burden with [facility] nurse on Monday    #Chronic Pain  #?Acute on Chronic Pain  Patient reporting thigh, low back pain.  Continues to report moderate to severe pain at times despite current regimen and recent adjustments.  Per nursing, patient often appears comfortable and then cries when they walk into the room.  Not utilizing IV dilaudid much at this point.  No sedation after 15mg doses.  Previously reported abdominal pain much better.  Sacral and thigh pain still uncontrolled per patient report.  Much improved pain on 12/16 based on my assessment, the daughters, and the nurse.  - Stopped scheduled PO hydromorphone doses given sedation  - Increase oxycodone 15 mg q4h PRN (her home dose) --> 20mg q4h prn after discussion with palliative care on 2/15  - Stop IV Dilaudid as needed on 2/15  - Changed acetaminophen to 975 mg TID     #Hx of ESBL urosepsis and bacteremia  #Bladder dysfunction s/p cystostomy and suprapubic catheter  Recently admitted 11/26 - 12/8/2024 for ESBL urosepsis and bacteremia requiring ICU w/ L nephrostomy tube (removed 1/7) treated w/ ertapenem, returned 1/25-1/27 for concern for UTI but no clear infection at that time.  ID was consulted that admission and recommended  avoiding frequent UA and Ucx checks in future unless patient w/ symptoms of UTI.   - UA/Ucx abnormal at admission, likely colonization, will continue to monitor off antibiotics   - may be overdue for suprapubic cath exchange - will look into this - patient reports this was changed about 1.5 weeks ago; will confirm this with daughter as I can't find indication of this in the chart  - sample on 2/16 to assess for continued blood (off suprapubic cath that was not replaced; not reliable for infectious assessment)    #Serous endometrial carcinoma  Follows w/ heme/onc through [facility]. S/p SNEHA, BSO 07/2024 s/p cycle 3 carbo/taxel 10/15/2024, cycle 4 delayed in s/o recent admission, family ultimately decided to forgo any further treatment.     Will discuss etiology of mouth sores with dental in a.m.     *If given anxiolytic for CT scan, would give less than IV Ativan 1mg (somnolent following this size dose on 2/13)    Disposition: Home vs. TCU        Diet: Combination Diet Regular Diet Adult  Snacks/Supplements Adult: Other; Send chocolate Ensure Enlive w/ breakfast, strawberry Ensure+HP with dinner; With Meals  Fluid restriction 2000 ML FLUID    DVT Prophylaxis: Mechanical   Shahid Catheter: Not present  Lines: PRESENT             Cardiac Monitoring: None  Code Status: Full Code      Clinically Significant Risk Factors        # Hypokalemia: Lowest K = 3.3 mmol/L in last 2 days, will replace as needed        # Hypoalbuminemia: Lowest albumin = 1.9 g/dL at 2/5/2025  2:52 PM, will monitor as appropriate     # Hypertension: Noted on problem list             # Moderate Malnutrition: based on nutrition assessment    # Financial/Environmental Concerns: unable to afford rent/mortgage (pt interested in resources/info on getting assistance paying rent, as well as applying for [employer] ASA benefits)         Social Drivers of Health            Disposition Plan     Medically Ready for Discharge: Anticipate 2 to 3 days; suspect home discharge  "with increased supports although possible TCU placement     Mukesh Bermudez DO, MPH  Internal Medicine-Pediatrics Hospitalist  Hospitalist Service, GOLD TEAM [address] [place] [address]  Securely message with [employer] (more info)  Text page via [employer] Paging/Directory   See signed in provider for up to date coverage information  ______________________________________________________________________    Interval History   Patient seen at bedside this morning.  Patient reports much improved pain today after adjustment that was made yesterday.  She thinks lower extremity edema is stable.  She continues to state that she desires to go home as opposed to a facility like a transitional care unit.    Called tahmina over the phone this afternoon.  She reiterated the importance of \"pushing\" her mom when it comes to rehabilitation.  She is concerned that she is just lying around and getting weaker.  She is willing to take her home and knows this is what her mom wants.  She did express concern that her mom will just get home and sit in the chair and will never get up.  She said that she is going to start coming in and encouraging her mom to move around.    Physical Exam   Vital Signs: Temp: 98.6  F (37  C) Temp src: Oral BP: 122/85 Pulse: 90   Resp: 16 SpO2: 100 % O2 Device: None (Room air)    Weight: 136 lbs 10.96 oz    Gen: Alert, lying in bed, chronically ill appearing  HEENT: Normocephalic/atraumatic, sclera clear, edentulous, oropharynx with mildly dry mucous membranes, stable ulcer on upper and lower middle gums  Resp: Clear bilaterally anteriorly, easy work of breathing on room air, no wheezing, shallow breathing   CV: Regular rate and rhythm, no murmurs noted    Abd: soft, mildly tender, nondistended  Ext: distal LE edema 2+ pitting (stable  Neuro: Alert and oriented x4, grossly non-focal but limited assessment    Medical Decision Making       Data     I have personally reviewed the " following data over the past 24 hrs:    7.9  \   8.7 (L)   / 171     139 106 16.5 /  82   3.3 (L) 22 0.71 \

## 2025-02-17 ENCOUNTER — APPOINTMENT (OUTPATIENT)
Dept: PHYSICAL THERAPY | Facility: CLINIC | Age: 70
End: 2025-02-17
Payer: MEDICARE

## 2025-02-17 LAB
ANION GAP SERPL CALCULATED.3IONS-SCNC: 11 MMOL/L (ref 7–15)
BUN SERPL-MCNC: 17.2 MG/DL (ref 8–23)
CALCIUM SERPL-MCNC: 8.2 MG/DL (ref 8.8–10.4)
CHLORIDE SERPL-SCNC: 105 MMOL/L (ref 98–107)
CREAT SERPL-MCNC: 0.73 MG/DL (ref 0.51–0.95)
EGFRCR SERPLBLD CKD-EPI 2021: 87 ML/MIN/1.73M2
ERYTHROCYTE [DISTWIDTH] IN BLOOD BY AUTOMATED COUNT: 19.8 % (ref 10–15)
GLUCOSE SERPL-MCNC: 86 MG/DL (ref 70–99)
HCO3 SERPL-SCNC: 22 MMOL/L (ref 22–29)
HCT VFR BLD AUTO: 26 % (ref 35–47)
HGB BLD-MCNC: 8.8 G/DL (ref 11.7–15.7)
MCH RBC QN AUTO: 29.8 PG (ref 26.5–33)
MCHC RBC AUTO-ENTMCNC: 33.8 G/DL (ref 31.5–36.5)
MCV RBC AUTO: 88 FL (ref 78–100)
PLATELET # BLD AUTO: 186 10E3/UL (ref 150–450)
POTASSIUM SERPL-SCNC: 3.4 MMOL/L (ref 3.4–5.3)
RBC # BLD AUTO: 2.95 10E6/UL (ref 3.8–5.2)
SODIUM SERPL-SCNC: 138 MMOL/L (ref 135–145)
WBC # BLD AUTO: 8.3 10E3/UL (ref 4–11)

## 2025-02-17 PROCEDURE — 250N000013 HC RX MED GY IP 250 OP 250 PS 637: Performed by: INTERNAL MEDICINE

## 2025-02-17 PROCEDURE — 85041 AUTOMATED RBC COUNT: CPT | Performed by: STUDENT IN AN ORGANIZED HEALTH CARE EDUCATION/TRAINING PROGRAM

## 2025-02-17 PROCEDURE — 85014 HEMATOCRIT: CPT | Performed by: STUDENT IN AN ORGANIZED HEALTH CARE EDUCATION/TRAINING PROGRAM

## 2025-02-17 PROCEDURE — 250N000011 HC RX IP 250 OP 636: Performed by: INTERNAL MEDICINE

## 2025-02-17 PROCEDURE — 80048 BASIC METABOLIC PNL TOTAL CA: CPT | Performed by: STUDENT IN AN ORGANIZED HEALTH CARE EDUCATION/TRAINING PROGRAM

## 2025-02-17 PROCEDURE — 250N000009 HC RX 250: Performed by: STUDENT IN AN ORGANIZED HEALTH CARE EDUCATION/TRAINING PROGRAM

## 2025-02-17 PROCEDURE — 250N000013 HC RX MED GY IP 250 OP 250 PS 637: Performed by: CLINICAL NURSE SPECIALIST

## 2025-02-17 PROCEDURE — 250N000013 HC RX MED GY IP 250 OP 250 PS 637: Performed by: STUDENT IN AN ORGANIZED HEALTH CARE EDUCATION/TRAINING PROGRAM

## 2025-02-17 PROCEDURE — 36591 DRAW BLOOD OFF VENOUS DEVICE: CPT | Performed by: STUDENT IN AN ORGANIZED HEALTH CARE EDUCATION/TRAINING PROGRAM

## 2025-02-17 PROCEDURE — 97530 THERAPEUTIC ACTIVITIES: CPT | Mod: GP

## 2025-02-17 PROCEDURE — 99233 SBSQ HOSP IP/OBS HIGH 50: CPT | Performed by: STUDENT IN AN ORGANIZED HEALTH CARE EDUCATION/TRAINING PROGRAM

## 2025-02-17 PROCEDURE — 99233 SBSQ HOSP IP/OBS HIGH 50: CPT | Performed by: NURSE PRACTITIONER

## 2025-02-17 PROCEDURE — 99418 PROLNG IP/OBS E/M EA 15 MIN: CPT | Performed by: NURSE PRACTITIONER

## 2025-02-17 PROCEDURE — 120N000002 HC R&B MED SURG/OB UMMC

## 2025-02-17 RX ORDER — DIPHENHYDRAMINE HYDROCHLORIDE AND LIDOCAINE HYDROCHLORIDE AND ALUMINUM HYDROXIDE AND MAGNESIUM HYDRO
10 KIT EVERY 6 HOURS PRN
Status: DISPENSED | OUTPATIENT
Start: 2025-02-17

## 2025-02-17 RX ORDER — DIPHENHYDRAMINE HYDROCHLORIDE AND LIDOCAINE HYDROCHLORIDE AND ALUMINUM HYDROXIDE AND MAGNESIUM HYDRO
10 KIT
Status: DISPENSED | OUTPATIENT
Start: 2025-02-17

## 2025-02-17 RX ADMIN — LIDOCAINE HYDROCHLORIDE 10 ML: 20 SOLUTION ORAL; TOPICAL at 08:11

## 2025-02-17 RX ADMIN — DIPHENHYDRAMINE HYDROCHLORIDE AND LIDOCAINE HYDROCHLORIDE AND ALUMINUM HYDROXIDE AND MAGNESIUM HYDRO 10 ML: KIT at 12:01

## 2025-02-17 RX ADMIN — ACETAMINOPHEN 975 MG: 325 TABLET, FILM COATED ORAL at 08:11

## 2025-02-17 RX ADMIN — SENNOSIDES AND DOCUSATE SODIUM 1 TABLET: 50; 8.6 TABLET ORAL at 20:03

## 2025-02-17 RX ADMIN — OXYCODONE HYDROCHLORIDE 20 MG: 10 TABLET ORAL at 20:04

## 2025-02-17 RX ADMIN — OXYCODONE HYDROCHLORIDE 20 MG: 10 TABLET ORAL at 06:29

## 2025-02-17 RX ADMIN — OXYCODONE HYDROCHLORIDE 20 MG: 10 TABLET ORAL at 01:57

## 2025-02-17 RX ADMIN — Medication 1 TABLET: at 08:11

## 2025-02-17 RX ADMIN — ACETAMINOPHEN 975 MG: 325 TABLET, FILM COATED ORAL at 20:03

## 2025-02-17 RX ADMIN — Medication 15 MG: at 08:11

## 2025-02-17 RX ADMIN — OXYCODONE HYDROCHLORIDE 20 MG: 10 TABLET ORAL at 12:01

## 2025-02-17 RX ADMIN — DIPHENHYDRAMINE HYDROCHLORIDE AND LIDOCAINE HYDROCHLORIDE AND ALUMINUM HYDROXIDE AND MAGNESIUM HYDRO 10 ML: KIT at 17:48

## 2025-02-17 RX ADMIN — Medication 5 ML: at 12:01

## 2025-02-17 RX ADMIN — LIDOCAINE 4% 2 PATCH: 40 PATCH TOPICAL at 20:03

## 2025-02-17 RX ADMIN — ACETAMINOPHEN 975 MG: 325 TABLET, FILM COATED ORAL at 13:59

## 2025-02-17 RX ADMIN — SENNOSIDES AND DOCUSATE SODIUM 1 TABLET: 50; 8.6 TABLET ORAL at 08:11

## 2025-02-17 ASSESSMENT — ACTIVITIES OF DAILY LIVING (ADL)
ADLS_ACUITY_SCORE: 74
ADLS_ACUITY_SCORE: 73
ADLS_ACUITY_SCORE: 72
ADLS_ACUITY_SCORE: 73
ADLS_ACUITY_SCORE: 72
ADLS_ACUITY_SCORE: 73
ADLS_ACUITY_SCORE: 74
ADLS_ACUITY_SCORE: 73
ADLS_ACUITY_SCORE: 73
ADLS_ACUITY_SCORE: 74
ADLS_ACUITY_SCORE: 73

## 2025-02-17 NOTE — CONSULTS
"Care Management Follow Up    Length of Stay (days): 12    Expected Discharge Date: 02/19/2025     Concerns to be Addressed: discharge planning     Patient plan of care discussed at interdisciplinary rounds: Yes    Anticipated Discharge Disposition:  (TCU vs Home Care (pt has preference of home))     Anticipated Discharge Services:  (if discharging home, [facility] home care RN/PT/OT)  Anticipated Discharge DME:  (No new DME anticipated)    Patient/family educated on Medicare website which has current facility and service quality ratings: yes  Education Provided on the Discharge Plan: Other (see comments) (plan in progress)  Patient/Family in Agreement with the Plan: yes    Referrals Placed by CM/SW:  (None currently)  Private pay costs discussed: Not applicable    Discussed  Partnership in Safe Discharge Planning  document with patient/family: No     Handoff Completed: No, handoff not indicated or clinically appropriate    Additional Information:    Per discussion in IDT, patient and daughter seem to be going back and forth on whether patient will be going home or to TCU. The recommendation remains TCU although patient has continuously declined to work with therapies. Per hospitalist, daughter Ti states that care team needs to \"keep pushing\" patient to participate in therapies. PT representative present at meeting and will pass this along to the PT who will see patient today. They will try to schedule a time when daughter can be available in person or via phone to encourage patient to participate with therapies. Palliative also to see patient today. Through all these interventions, should be more clear if patient wants TCU or home. Patient likely to be medically ready for discharge tomorrow so will need to get clear plan on discharge disposition.     Consult placed for Care Management/Social Work to assist with gyn/onc joining the care conference for tomorrow 2/18 at 2pm. Gyn/onc can join by joining [employer] and " "typing in the Patient Tablet Device ID and PIN located in bottom left panel of Epic. SW sent this information via page to gyn/onc resident.  available to join care conference if needed.    ADDENDUM 4:20PM  Received call from Lissa Paiz, patient's . Updated her on care conference tomorrow to discuss disposition. Lissa also informed SW that sometime recently, patient accidentally got marked as \"\" in the state system. After that happened, her Medical Assistance and the waiver was closed. Patient has since been marked as alive, but they are still dealing with the ramifications of this mistake. Patient's [employer] ended in January (but her Medical Assistance is still active) so they are working on re-establishing the [facility] prepaid health plan. They are also working on reopening patient's Elderly Waiver. Therefore, services will not be able to resume upon discharge if she goes home from the hospital. For this reason (and the others) TCU would be best for patient. SW to pass along this info to IDT/patient/family.    Next Steps:     After care conference , discuss with IDT what the patient and family decided re: disposition and proceed accordingly.    If discharging to home:  1) Resume home care services and ensure home care nursing is able to provide wound care/teach family wound care   2) Discuss with therapies/IDT what DME is needed    Keep Lissa, Care Coordinator updated on discharge plan.          Mari Hernandez, BSW, LSW  8 Med Surg   [facility]  Phone: [phone]  Vocera: Searchable by name or group: 8 Med Surg Vocera    "

## 2025-02-17 NOTE — PROGRESS NOTES
"Shift Report: 0700-2330  VS: /85 (BP Location: Right arm)   Pulse 100   Temp 98.7  F (37.1  C) (Oral)   Resp 16   Ht 1.6 m (5' 3\")   Wt 62 kg (136 lb 11 oz)   SpO2 100%   BMI 24.21 kg/m     Output/Last BM: SP Catheter, no BM this shift   Activity: Not oob   Skin/Dressing: Dressings changed to BLE   Pain: Ongoing sacrum pain, prn oxycodone   CMS: A/O, forgetful    Diet: Regular diet, poor po intake   LDA: R chest port-a-cath   Equipment:    Plan:    Additional Info: Palliative Care visit today, see notes  Encouraged repositioning while in bed, pt will scoot herself back up into sitting position. Education provided on importance of repositioning off buttocks to assist with wound healing     Goal Outcome Evaluation:  Plan of Care Reviewed With: Patient  Overall Patient Progress: Declining  Outcome Evaluation:     "

## 2025-02-17 NOTE — PLAN OF CARE
Goal Outcome Evaluation:      Plan of Care Reviewed With: patient    Overall Patient Progress: no changeOverall Patient Progress: no change    Outcome Evaluation: Pt refused to do every two hour turns Family let RN know that its ok to leave her for now

## 2025-02-17 NOTE — PROGRESS NOTES
Shift [id number]:Pt admitted on 2/2/25 with MADHU,Hyponatremia, worsening BLE edema and progressive coccyx sores  Summary:Pt will return to TCU  Pt has a history of cervical cancer,Serous endometrial adenocarcinoma with SP catheter placement.  VS:       Pt A/O X 4. Afebrile. VS'S. Lungs- Clear bilaterally Denies nausea, shortness of breath, and chest pain.     Output:       Bowels- + in all four quadrants Last BM 2/12/25.Pt has adequate output from his SP catheter .      Activity:       Pt  not OOB Uses the bedpan .  Repositioned every two hours, pt is refusing  Assist of 2 with repositioning and cares   Skin:   Wounds in the coccyx/sacrum and groin Scattered open wounds to BLE Severe edema to BLE-Lymphedema wraps removed yesterday and pt refused to have them put back on     Pain:       Has pain in the Coccyx and is managed with Oxycodone PRN      CMS:       CMS and Neuro's are intact. Denies numbness and tingling in all extremities.      Dressing:     Dressings CDI.   Mepilex to BLE. Bilateral elbows and Coccyx   Diet:       Pt is on a Regular diet  Pt is on a 2000 fluid restriction     LDA:       Pt has a Rachael cath_Hep Locked.   Dressing change due 2/12/2   Equipment:        Bilateral heels are elevated off the bed.     Plan:       Pt is able to make needs known and the call light is within the pt's reach. Continue to monitor.       Additional Info:        .Pt refused to have cares or be turned. Pts daughter talked to and informed daughter that she does not want to move in bed. Pt was given Oxycodone Prn

## 2025-02-17 NOTE — PROGRESS NOTES
"  PALLIATIVE CARE PROGRESS NOTE  [facility]     Patient Name: Alis Hartman  Date of Admission: 2/5/2025      Recommendations & Counseling       GOALS OF CARE:   Restorative with limits , ongoing discussion TBD  Aranza is not able to verbalize a good understanding of her current illness today.  She does share her main wish to get out of the hospital and return home.  Aranza's daughter Joy does express concern that \"she seems to just keep getting worse\" despite hospitalizations and rehab stays. She has some understanding of Aranza's current illness but he focus is on encouraging Aranza to work with therapy and participate in recommended imaging.  She has many questions about Aranza's prognosis and current care plan.  Recommend care conference (Daughter Joy agrees to meet at 2PM 2/18)  Medicine in agreement and SW will request phone participation from OB/GYN.  Conference Goals: 1. Review current course of care, with time for Joy's questions.  2. Share concern about prognosis 3. Work for a shared goal of care between patient and daughter in line with Aranza's best interest.     ADVANCE CARE PLANNING:  No health care directive on file. Per system policy, Surrogate Decision-makers for Patients With Diminished Decision-making Capacity offers guidance on possible decision-makers. Children has been identified as a surrogate decision maker.   There is a POLST form on file, this was reviewed and current.  Code status: Full Code    MEDICAL MANAGEMENT:   #Pain, chronic, Low back and abdominal after surgery in July. Per daughter and Chronic Pain provider Dr. Maru Bower [phone]     #Pain, L thigh \"hot\" worse with palpation of lumbar paraspinal soft tissue?  Dr. Bower notes Aranza does have radiation to thighs with paraspinal tissue exam.   Source of abdominal and thigh pain unclear, may be flare of chronic, but cannot r/unit(s) out cancer progression or worsening " "spinous process as cause of pain without imaging  #Pain, sacral/coccyx wound and B arm and leg wounds.  Consider working up abdominal, low back radicular \" pain if appropriate/ patient agrees.  Added morphine gel to open wounds for pain control  Would not reschedule opioid given sedation with scheduled dilaudid  Continue oxycodone 20 mg q4h PRN (increase from her home dose of 15mg), hold if sedated  Would not increase again if no evidence of acute reason for pain.   Continue acetaminophen to 975 mg TID  lidocaine patches to painful areas of abdomen and back  If sedation continues to be limiting factor, consider transition to Butran's Patch after pharmacy request for coverage. Can discuss with daughter.  Benefits of lower opioid related side effects as this is a partial mu-opioid agonist as well as a kappa and delta opioid antagonist. Buprenorphine provides analgesia at a relatively low dose given high opioid receptor affinity.  Buprenorphine is appropriate to start in patients who require around the clock analgesia, even those who are opioid naive, have renal or liver impairment or for whom full opioid agonists are especially high risk.  Chronic Pain Provider Dr. Bower requests Palliative follow-up of pain, given concern for pain in addition to chronic pain.  Once plan is clear if this is reasonable, can place referral upon discharge.     #Anxiety, Generalized Anxiety  Use/practice relaxation strategies, Effectively communicate with medical provider re needed information, Effectively communicate with family/friends re needs, Identify triggers for anxiety/panic attacks, Identify early signs/symptoms of anxiety, and Identify effective coping strategies  Unfortunately most anxiety short acting medications cause sedation (lorazepam, hydroxyzine, buspar), and would recommend duloxetine given benefit of mood and pain from it, would need to discuss with daughter.    PSYCHOSOCIAL/SPIRITUAL:  Family daughter " [facility]    Palliative Care will continue to follow. Thank you for the consult and allowing us to aid in the care of Alis Hartman.    These recommendations have been discussed with Bedside nurse and Hospitalist    ERINN Luke CNP  [facility], Palliative Care  Securely message with the [employer] Web Console (learn more here) or  Text page via [facility] Paging/Directory        Assessment          Alis Hartman is a 69 year old female with a past medical history of cervical cancer s/p radiation, seriuos adenocarcinoma s/p SNEHA/BSO and cystotomy w/ suprapubic catheter placement 07/2024 and 3 cycles of carbo/taxol (10/2024), hx ESBL urosepsis and bacteremia, RNY gastric bypass, afib on apixaban, HTN, CKD stage 3, and recent admission 1/25-1/27 for AMS, hematuria, pyuria, presented on 2/5 with MADHU, hyponatremia, worsening BLE edema, progressive coccyx sores. Her hospital course has been complicated now by new and significant bleeding most likely from her vagina, now resolved. Gyn/Onc was consulted and recommended CT. She has been unable to get this CT due to inability to lay flat.     Today, the patient was seen for:  AMS  Chronic pain  Coccyx pressure ulcer wounds  Leg wounds  Vaginal bleeding      Interval History:     Multidisciplinary collaboration:  Discussed care with primary team and Chronic pain Provider Dr. Lior Bower.  Dr. Bower notes she has been treating Aranza for low back pain and abdominal pain that began after surgery in July.  She is away from records, but does recall that Aranza had some level of radicular pain with palpation of lumbar Paraspinous soft tissues.     Notable medications:  Oxycodone 20mg q 4 hours PRN (increased form 15mg over the weekend.)   100mg oxycodone =150OME in past 24 hours.     Patient/family narrative  Patient was focused mainly on her pain unable to give me any details of her current care.  When asked what the doctor said told her about her condition she  "reports \"while the same thing they told you.\"  She does share that she would like to get home and leave the hospital.  She is also in agreement to a care conference tomorrow to \"figure out what is the best plan for me.\"  I spoke with daughter Jeannie via the phone 20 expressed concern that despite recurrent hospitalizations and rehab stays that Hannah has continued to get worse and her opinion.  She has some understanding of Hannah's current medical condition but has questions about the status of her cancer and ability to get continued treatment.  She is hopeful that we can convince Hannah to undergo further imaging to better understand as well as Kocher to participate in therapy to get stronger.  I expressed my concern as she did that despite the care time he has been getting she has continued to get worse and I am worried that this trend will continue.  Jeannie agrees that a care conference tomorrow to get her questions answered and to consider the right plan of care for Hannah makes the most sense.    Review of Systems:     Besides above, ROS was reviewed and is unremarkable        Physical Exam:   Temp:  [98.7  F (37.1  C)-98.8  F (37.1  C)] 98.8  F (37.1  C)  Pulse:  [100-105] 105  Resp:  [16] 16  BP: (122)/(83) 122/83  SpO2:  [100 %] 100 %  136 lbs 10.96 oz    Physical Exam  GEN:  Alert, unable to share details of her condition or care, appears in pain, but in no distress after pain medication.  HEENT:  Normocephalic/atraumatic, no scleral icterus, no nasal discharge, mouth moist.  LUNGS: Nonlabored breathing.  Symmetric chest rise on inhalation noted.  ABD:  Soft, tender to light palpation in general/non-distended.  No rebound/guarding/rigidity.  EXT:  B LE edema .  Noted pain in L thigh not worse with palpation but sensitive to touch.  Possibly worse with palpation of L lumbar Paraspinous tissue?  SKIN:  Dry to touch, no exanthems noted in the visualized areas.  Multiple areas with dressing, see WOC note for pics. "           Data Reviewed:     Recent Labs   Lab 02/17/25  0758 02/16/25  0831 02/15/25  0836   WBC 8.3 7.9 6.9   HGB 8.8* 8.7* 8.7*   MCV 88 88 87    171 152    139 139   POTASSIUM 3.4 3.3* 3.5   CHLORIDE 105 106 105   CO2 22 22 23   BUN 17.2 16.5 16.9   CR 0.73 0.71 0.74   ANIONGAP 11 11 11   SAMUEL 8.2* 8.4* 8.7*   GLC 86 82 85     ULTRASOUND LOWER EXTREMITY VENOUS DUPLEX BILATERAL 2/5/2025 2:14 PM     CLINICAL HISTORY: Increased bilateral lower edema       COMPARISONS: None available.     REFERRING PROVIDER: RONALD BHARDWAJ CHRISTIAN     TECHNIQUE: Grayscale, color Doppler, Doppler waveform ultrasound  evaluation was performed through the bilateral common femoral,  femoral, and popliteal veins. Bilateral posterior tibial and peroneal  veins were evaluated with grayscale imaging and compression.     FINDINGS: Right common femoral, femoral, and popliteal veins are fully  compressible with phasic Doppler waveforms.     Right posterior tibial veins are fully compressible.     Right peroneal veins are fully compressible.     Left common femoral, femoral, and popliteal veins are fully  compressible with phasic Doppler waveforms.     Left posterior tibial veins are fully compressible.     Left peroneal veins are fully compressible.                                                                      IMPRESSION: No deep venous thrombosis demonstrated in either leg.    Narrative & Impression   EXAM: CT ABDOMEN PELVIS W/O CONTRAST  LOCATION: [facility]  DATE: 12/12/2024     INDICATION: Status post percutaneous nephrostomy tube placement.   COMPARISON: 11/07/2024  TECHNIQUE: CT scan of the abdomen and pelvis was performed without IV contrast. Multiplanar reformats were obtained. Dose reduction techniques were used.  CONTRAST: None.     FINDINGS:   LOWER CHEST: Small right and trace left pleural effusions with mild right basilar atelectasis. A calcified granuloma noted  in the left lower lobe. Small hiatal hernia is unchanged.     HEPATOBILIARY: Tiny stones present in the gallbladder. Liver and gallbladder otherwise normal. An 8 mm cyst again seen in the left hepatic lobe.     PANCREAS: Normal.     SPLEEN: Normal.     ADRENAL GLANDS: Normal.     KIDNEYS/BLADDER: A left percutaneous nephrostomy tube is present and coils in an interpolar calyx. No abnormal fluid collection along the tube tract. Previous left hydroureteronephrosis has mostly resolved. Urothelial thickening of the left renal pelvis   and ureter is redemonstrated. No urinary stones. No right hydroureteronephrosis. Bladder is decompressed by a suprapubic Shahid catheter.     BOWEL: Postoperative changes of Kiko-en-Y gastric bypass. No inflammatory bowel thickening or bowel obstruction. Trace free fluid in the pelvis. No intraperitoneal free air.     LYMPH NODES: Multiple subcentimeter short axis retroperitoneal lymph nodes are likely reactive. No enlarged lymph nodes in the abdomen and pelvis.     VASCULATURE: Moderate bilateral iliofemoral atherosclerotic calcifications. No abdominal aortic aneurysm.     PELVIC ORGANS: Status post hysterectomy. No abnormal adnexal masses.     MUSCULOSKELETAL: Extensive body wall edema present, unchanged. Grade 1 degenerative anterolisthesis of L4 and L5 and other stable spinal degenerative changes. No destructive osseous lesions or acute fractures.                                                                         IMPRESSION:      1.  [Percutaneous nephrostomy tube in appropriate position without complications. Previous left hydroureteronephrosis is mostly resolved.     2.  Urothelial thickening of the left ureter and left renal pelvis, possibly infectious. Recommend correlation with urinalysis.     3.  Small hiatal hernia, cholelithiasis and postoperative changes of Kiko-en-Y gastric bypass.     4.  Small right/trace left pleural effusions, trace pelvic free fluid and diffuse body  wall edema.       Medical Decision Making       Please see A&P for additional details of medical decision making.  MANAGEMENT DISCUSSED with the following over the past 24 hours: Hospitalist, Bedside nurse   NOTE(S)/MEDICAL RECORDS REVIEWED over the past 24 hours: Medicine, Gyn/Onc, SW, Nursing, WOC  Tests REVIEWED in the past 24 hours:  - See lab/imaging results included in the data section of the note  SUPPLEMENTAL HISTORY, in addition to the patient's history, over the past 24 hours obtained from:   - Adult daughter/caregiver  Medical complexity over the past 24 hours:  - Prescription DRUG MANAGEMENT performed  90 MINUTES SPENT BY ME on the date of service doing chart review, history, exam, documentation & further activities per the note.

## 2025-02-18 ENCOUNTER — APPOINTMENT (OUTPATIENT)
Dept: OCCUPATIONAL THERAPY | Facility: CLINIC | Age: 70
DRG: 682 | End: 2025-02-18
Payer: MEDICARE

## 2025-02-18 LAB
ANION GAP SERPL CALCULATED.3IONS-SCNC: 12 MMOL/L (ref 7–15)
BUN SERPL-MCNC: 19 MG/DL (ref 8–23)
CALCIUM SERPL-MCNC: 8.5 MG/DL (ref 8.8–10.4)
CHLORIDE SERPL-SCNC: 103 MMOL/L (ref 98–107)
CREAT SERPL-MCNC: 0.8 MG/DL (ref 0.51–0.95)
EGFRCR SERPLBLD CKD-EPI 2021: 78 ML/MIN/1.73M2
ERYTHROCYTE [DISTWIDTH] IN BLOOD BY AUTOMATED COUNT: 19.9 % (ref 10–15)
GLUCOSE SERPL-MCNC: 96 MG/DL (ref 70–99)
HCO3 SERPL-SCNC: 20 MMOL/L (ref 22–29)
HCT VFR BLD AUTO: 27.3 % (ref 35–47)
HGB BLD-MCNC: 9.2 G/DL (ref 11.7–15.7)
MCH RBC QN AUTO: 29.9 PG (ref 26.5–33)
MCHC RBC AUTO-ENTMCNC: 33.7 G/DL (ref 31.5–36.5)
MCV RBC AUTO: 89 FL (ref 78–100)
PLATELET # BLD AUTO: 245 10E3/UL (ref 150–450)
POTASSIUM SERPL-SCNC: 3.6 MMOL/L (ref 3.4–5.3)
RBC # BLD AUTO: 3.08 10E6/UL (ref 3.8–5.2)
SODIUM SERPL-SCNC: 135 MMOL/L (ref 135–145)
WBC # BLD AUTO: 9.7 10E3/UL (ref 4–11)

## 2025-02-18 PROCEDURE — 99418 PROLNG IP/OBS E/M EA 15 MIN: CPT | Performed by: PHYSICIAN ASSISTANT

## 2025-02-18 PROCEDURE — 99233 SBSQ HOSP IP/OBS HIGH 50: CPT | Performed by: PHYSICIAN ASSISTANT

## 2025-02-18 PROCEDURE — 85014 HEMATOCRIT: CPT | Performed by: STUDENT IN AN ORGANIZED HEALTH CARE EDUCATION/TRAINING PROGRAM

## 2025-02-18 PROCEDURE — 85049 AUTOMATED PLATELET COUNT: CPT | Performed by: STUDENT IN AN ORGANIZED HEALTH CARE EDUCATION/TRAINING PROGRAM

## 2025-02-18 PROCEDURE — 250N000013 HC RX MED GY IP 250 OP 250 PS 637: Performed by: CLINICAL NURSE SPECIALIST

## 2025-02-18 PROCEDURE — 99368 TEAM CONF W/O PAT BY HC PRO: CPT

## 2025-02-18 PROCEDURE — 250N000011 HC RX IP 250 OP 636: Performed by: STUDENT IN AN ORGANIZED HEALTH CARE EDUCATION/TRAINING PROGRAM

## 2025-02-18 PROCEDURE — 250N000011 HC RX IP 250 OP 636: Performed by: INTERNAL MEDICINE

## 2025-02-18 PROCEDURE — 120N000002 HC R&B MED SURG/OB UMMC

## 2025-02-18 PROCEDURE — 36591 DRAW BLOOD OFF VENOUS DEVICE: CPT | Performed by: STUDENT IN AN ORGANIZED HEALTH CARE EDUCATION/TRAINING PROGRAM

## 2025-02-18 PROCEDURE — 84295 ASSAY OF SERUM SODIUM: CPT | Performed by: STUDENT IN AN ORGANIZED HEALTH CARE EDUCATION/TRAINING PROGRAM

## 2025-02-18 PROCEDURE — 99498 ADVNCD CARE PLAN ADDL 30 MIN: CPT | Mod: 25 | Performed by: INTERNAL MEDICINE

## 2025-02-18 PROCEDURE — 99497 ADVNCD CARE PLAN 30 MIN: CPT | Mod: 25 | Performed by: INTERNAL MEDICINE

## 2025-02-18 PROCEDURE — 250N000013 HC RX MED GY IP 250 OP 250 PS 637: Performed by: INTERNAL MEDICINE

## 2025-02-18 PROCEDURE — G0463 HOSPITAL OUTPT CLINIC VISIT: HCPCS

## 2025-02-18 PROCEDURE — 99233 SBSQ HOSP IP/OBS HIGH 50: CPT | Mod: 25 | Performed by: INTERNAL MEDICINE

## 2025-02-18 PROCEDURE — 250N000013 HC RX MED GY IP 250 OP 250 PS 637: Performed by: NURSE PRACTITIONER

## 2025-02-18 PROCEDURE — 80048 BASIC METABOLIC PNL TOTAL CA: CPT | Performed by: STUDENT IN AN ORGANIZED HEALTH CARE EDUCATION/TRAINING PROGRAM

## 2025-02-18 PROCEDURE — 250N000013 HC RX MED GY IP 250 OP 250 PS 637: Performed by: STUDENT IN AN ORGANIZED HEALTH CARE EDUCATION/TRAINING PROGRAM

## 2025-02-18 RX ADMIN — DIPHENHYDRAMINE HYDROCHLORIDE AND LIDOCAINE HYDROCHLORIDE AND ALUMINUM HYDROXIDE AND MAGNESIUM HYDRO 10 ML: KIT at 14:07

## 2025-02-18 RX ADMIN — DIPHENHYDRAMINE HYDROCHLORIDE AND LIDOCAINE HYDROCHLORIDE AND ALUMINUM HYDROXIDE AND MAGNESIUM HYDRO 10 ML: KIT at 06:12

## 2025-02-18 RX ADMIN — SENNOSIDES AND DOCUSATE SODIUM 1 TABLET: 50; 8.6 TABLET ORAL at 08:41

## 2025-02-18 RX ADMIN — DIPHENHYDRAMINE HYDROCHLORIDE AND LIDOCAINE HYDROCHLORIDE AND ALUMINUM HYDROXIDE AND MAGNESIUM HYDRO 10 ML: KIT at 18:39

## 2025-02-18 RX ADMIN — Medication 15 MG: at 10:04

## 2025-02-18 RX ADMIN — Medication 1 TABLET: at 08:41

## 2025-02-18 RX ADMIN — ACETAMINOPHEN 975 MG: 325 TABLET, FILM COATED ORAL at 14:08

## 2025-02-18 RX ADMIN — OXYCODONE HYDROCHLORIDE 20 MG: 10 TABLET ORAL at 23:02

## 2025-02-18 RX ADMIN — OXYCODONE HYDROCHLORIDE 20 MG: 10 TABLET ORAL at 14:08

## 2025-02-18 RX ADMIN — SENNOSIDES AND DOCUSATE SODIUM 1 TABLET: 50; 8.6 TABLET ORAL at 20:13

## 2025-02-18 RX ADMIN — OXYCODONE HYDROCHLORIDE 20 MG: 10 TABLET ORAL at 01:32

## 2025-02-18 RX ADMIN — Medication 5 ML: at 14:07

## 2025-02-18 RX ADMIN — ACETAMINOPHEN 975 MG: 325 TABLET, FILM COATED ORAL at 08:41

## 2025-02-18 RX ADMIN — DIPHENHYDRAMINE HYDROCHLORIDE AND LIDOCAINE HYDROCHLORIDE AND ALUMINUM HYDROXIDE AND MAGNESIUM HYDRO 10 ML: KIT at 08:48

## 2025-02-18 RX ADMIN — Medication 5 ML: at 14:11

## 2025-02-18 RX ADMIN — OXYCODONE HYDROCHLORIDE 20 MG: 10 TABLET ORAL at 10:04

## 2025-02-18 RX ADMIN — Medication 1 MG: at 23:02

## 2025-02-18 RX ADMIN — OXYCODONE HYDROCHLORIDE 20 MG: 10 TABLET ORAL at 06:06

## 2025-02-18 RX ADMIN — OXYCODONE HYDROCHLORIDE 20 MG: 10 TABLET ORAL at 18:39

## 2025-02-18 RX ADMIN — ACETAMINOPHEN 975 MG: 325 TABLET, FILM COATED ORAL at 20:13

## 2025-02-18 RX ADMIN — Medication 1 MG: at 04:48

## 2025-02-18 ASSESSMENT — ACTIVITIES OF DAILY LIVING (ADL)
ADLS_ACUITY_SCORE: 73
ADLS_ACUITY_SCORE: 76
ADLS_ACUITY_SCORE: 73
ADLS_ACUITY_SCORE: 72
ADLS_ACUITY_SCORE: 73

## 2025-02-18 NOTE — PLAN OF CARE
"Goal Outcome Evaluation:       BP (!) 133/92 (BP Location: Right arm)   Pulse 110   Temp 99  F (37.2  C) (Oral)   Resp 18   Ht 1.6 m (5' 3\")   Wt 62 kg (136 lb 11 oz)   SpO2 100%   BMI 24.21 kg/m        Overall Patient Progress: no changeOverall Patient Progress: no change    Outcome Evaluation: pt refused to turn off sacrum, but did allow heels to be floated  Med for pain with scheduled tylenol and prn oxycodone, mouth pain with magic mouthwash. Dressings changed to sacrum, left posterior leg and right posterior and anterior leg. BLE edema, elevated on pillows  Care conference with family today, Dispo to home TBD continue plan of care   "

## 2025-02-18 NOTE — PROGRESS NOTES
[facility] Nurse Inpatient Assessment     Consulted for: coccyx, NEW consult 2/11 for groin and bilateral lower legs    Summary: Site of previously healed pressure injury, seen on last admit 1/27/25, now with 3 open areas.    2/11: Sacral wound is stable. Patient with skin tears on bilateral lower legs and arms from edema. Consult for groin wounds however bleeding noted to be vaginal, not from a wound in the groin.      [facility] nurse follow-up plan: weekly    Patient History (according to provider note(s):      Alis Hartman is a 69 year old woman admitted on 2/5/2025. She has a history of paranoid schizophrenia, lower extremity edema, cervical cancer, ESBL UTI, atrial fibrillation, gastric bypass and CKD who is admitted from home with worsening BLE edema and new coccyx sores.  The patient was just in the hospital from 1/25-27 with hematuria and pyuria.  She had her catheter exchanged and while PT/OT recommend TCU she elected to return home.     Assessment:      Areas visualized during today's visit: Sacrum/coccyx    Pressure Injury Location: Sacrum/coccyx     2/6 2/18    Last photo: 2/18/25  Wound type: Pressure Injury and Shear     Pressure Injury Stage: site of previously healed pressure injury, present on admission     Wound history/plan of care:   unknown worst stage, currently appears to be a stage 2 but was possibly deeper. Was noted as a reinjury on her last assessment here 12/4/24 and 1/27/25  Wound base: 100 % Fibrin     Palpation of the wound bed: normal      Drainage: small     Description of drainage: none     Measurements (length x width x depth, in cm) 0.5  x 0.2  x  0.1 cm, 0.4 x 0.2 x 0.1 cm and 0.4 x 0.3 x 0.1 cm   Periwound skin: Intact and Erythema- blanchable      Color: normal and consistent with surrounding tissue      Temperature: normal   Odor: none  Pain: moderate, tender  Pain  intervention prior to dressing change: slow and gentle cares   Treatment goal: Heal   STATUS: stable  Supplies ordered: discussed with RN and discussed with patient     Wound Location: Bilateral lower legs and arms      2/11 Left anterior lower leg                            2/18  Left anterior lower leg        2/11 Right anterior lower leg                         2/18 Right anterior lower leg    Last photo: 2/18/2025  Wound due to: Skin Tear vs blistering  Wound history/plan of care: Patient with blistering from edema which have unroofed  Wound base: mix of slough and dermis     Palpation of the wound bed: normal      Drainage: scant     Description of drainage: serous     Measurements (length x width x depth, in cm): 2 cm x 2.3 cm x 0.1 cm     Tunneling: N/A     Undermining: N/A  Periwound skin: Intact      Color: pink      Temperature: normal   Odor: none  Pain: moderate, aching  Pain interventions prior to dressing change: slow and gentle cares   Treatment goal: Protection  STATUS: healing  Supplies ordered: gathered    Treatment Plan:     Sacrum  Q shift, Every 3 days and as needed  Peel back dressing Q shift for evaluation, reapply or change as needed   Cleanse the area with NS and pat dry.  Apply No sting film barrier to periwound skin.  Dust open areas with ostomy powder and pat into place.   Cover wound with Sacral Mepilex (#[id number])  Turn and reposition Q 2hrs side to side only.  Ensure pt has Tanner-cushion while sitting up in the chair.  Ensure isoflex pump on bed and set  FYI- If pt has constant incontinent loose stools needing dressing changes Q shift please discontinue the Mepilex dressing and apply criticaid barrier paste BID and PRN.      Bilateral lower legs and arm wounds: Every other day: remove dressings and wash wounds with Vashe and gauze. Pat dry. Cover open areas with Vaseline gauze and secure with large Mepilex or ABD and stretch netting. If using Mepilex, note patient has very delicate skin so  care should be taken to prevent worsening skin tears when removing.     Orders: Reviewed    RECOMMEND PRIMARY TEAM ORDER: None, at this time  Education provided: importance of repositioning, plan of care, and wound progress  Discussed plan of care with: Patient and Nurse  Notify [facility] if wound(s) deteriorate.  Nursing to notify the Provider(s) and re-consult the [facility] Nurse if new skin concern.    DATA:     Current support surface: Standard  Standard gel mattress (Isoflex)  Containment of urine/stool: Incontinence Protocol  BMI: Body mass index is 24.21 kg/m .   Active diet order: Orders Placed This Encounter      Combination Diet Regular Diet Adult     Output: I/O last 3 completed shifts:  In: 200 [P.O.:200]  Out: -      Labs:   Recent Labs   Lab 02/18/25  0640   HGB 9.2*   WBC 9.7     Pressure injury risk assessment:   Sensory Perception: 3-->slightly limited  Moisture: 3-->occasionally moist  Activity: 1-->bedfast  Mobility: 2-->very limited  Nutrition: 2-->probably inadequate  Friction and Shear: 1-->problem  Yogi Score: 12    Kerry Holbrook RN CWOCN  Pager no longer is use, please contact through [facility] group: [facility] Nurse [facility]  Dept. Office Number: [phone]

## 2025-02-18 NOTE — PROGRESS NOTES
"[facility]    Medicine Progress Note - Hospitalist Service, GOLD TEAM 22    Date of Admission:  2/5/2025    Assessment & Plan   \"Aranza\" Alis Hartman is a 69 year old woman admitted on 2/5/2025. 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    Changes:   -care conference this afternoon- see summary below  -will work to obtain the CT scan with pain meds- appreciate palliative care assistance  -family + SW will work to obtain necessary equipment for discharge to home with family  -Aranza is willing to work on rehabbing    #Acute on Chronic anemia (multifactorial), stable  #Vaginal bleeding  #Gross Hematuria  #Iron Deficiency Anemia  Likely multifactorial 2/2 malignancy, renal disease, nutrition. Likely hemoconcentrated at admission, down to 7.5 and recent baseline appears near this.  Continues to have bleeding on 12/7 with hemoglobin down to 7.2.  UA with large blood but patient has bright red blood in opening of vagina on exam per [facility] nurse.  Therefore, suspect vaginal source.  Vaginal bleeding present on digital exam with minimal tolerability from patient and No overt fungating mass or vaginal abnormality noted, possible nodularity in right vaginal apex vs agglutination per gyn-onc. Continued blood in depends; appears to be in vaginal canal per WO assessment - GYN-ONC referral placed.  CT A/P with IV contrast ordered to assess for disease recurrence for prognostication discussions given new onset vaginal bleeding - per gyn-onc, likely etiology is atrophic vagina but she has a high risk for " recurrence with this histology.  Patient unable to lie flat for CT imaging.  Hgb 5.9; 1 unit of pRBC ordered (consented daughter as patient somnolent from anxiolytic prior to CT scan) on 2/14.  No clear documentation of continued vaginal bleeding since stopping DOAC as of 2/14.  Normal ferritin on while iron.  Iron studies possibly consistent with some component of iron deficiency with anemia of CKD/chronic disease.  Folate and B12 wnl.    - Hold DOAC for now 2/12 (discussed risk and benefit with Aranza on 2/12) given vaginal bleeding and anemia  - Monitor for vaginal bleeding; still having intermittent spotting  - Hgb daily; hgb goal >7 (already consented) - stable for multiple days  - Continue Ferrous Sulfate drops daily   - Discussed hematuria on recent UA's with urology team; since it is now microscopic, low suspicion this will have in contributing factor to her anemia overtime.  If patient desires, would recommend urology referral at discharge with possible cystoscopy for evaluation     Recurrent hospitalizations with progressive step-wise decline  Severe malnutrition  Serous endometrial carcinoma  Advanced care planning Discussion  2/18/2025. I, Maci Chandler MD met with Aranza (patient) and her daughter (Joy) today at the hospital to discuss Advance Care Planning. Alis (Francisco Hartman does have decisional capacity and was present for this discussion.  Those present were informed of the voluntary nature of this discussion and wished to proceed.  The discussion included: We discussed that during this current hospitalization, we have been able to temporize many of her medical problems.  We discussed that, as she and her daughter have noticed, she has had a step-wise decline in her mobility with each hospitalization. This has not been helped by times where she has not wanted to participate in physical therapy (regardless of rationale). She is adamant about not going to a TCU due to a bad experience  and unfortunately, she is not a TCU candidate at this time. Likewise, Aranza and Joy have not seen improvement in her appetite or ability to take on nutrition, partly worsened by her dislike for the hospital food.  We discussed that the swelling in her lower extremities is in part due to her lack of nutrition.  We discussed that her sacral wounds, while caused by lack of mobility, are unlikely to heal without adequate nutrition.  We discussed that typically, when somebody gets to this point, their health continues to decline.  We discussed that while there are ways to be aggressive with nutrition, there are typically costs (e.g. if tube feeding is pursued that can come with discomfort of tube in nose, abdominal fullness, diarrhea etc).  Even still, despite everything we do, the body may continue to decline.  Aranza and Joy made it clear throughout the conversation that their preference would be for Aranza to be at home, not only due to comfort but also due to improved participation with cares and therapies.  We discussed that if this option is pursued, it would require a lot of equipment and they are willing to try to make that work.  We also discussed that the alternative, would at this point be a long-term care facility.  They are aware.  Regarding her general outlook, OBGYN said it is difficult to provide more info.  She has been having vaginal bleeding which could either be vaginal atrophy (and bleeding has decreased with stopping blood thinner) or it could represent a recurrence of her endometrial cancer.  Her endometrial cancer is typically an aggressive type and likely will recur at some point according to OBGYN.  Should it recur, they said they would not offer treatment as they are treatment options would worsen her health rather than improve it.  Based on this discussion we broached the topic of hospice but they were not interested in this option at this time.  This discussion began at 2 PM and ended  at 3 PM for a total of 60 minutes.       # MADHU on CKD3 - resolved  # hyponatremia, likely intra-vascularly hypovolemic - resolved    Cr worsened on admission compared to prior, slowly worsening since 1/25. Overall studies suggest prerenal but iniitally didn't improve much with small IVF+ encouraging PO intake but this has been limited. History of  hydronephrosis related to her endometrial cancer s/p PNT removal, L just in December 2024. Cystatin C expectedly lower, GFR estimated to be <30. Hyponatremia has been worsening in the last few weeks in the setting of poor PO intake, now likely prerenal MADHU, poor nutrition. Renal US negative for hydronephrosis. Cr peaked at 1.44, now Cr and electrolytes improving on IV albumin, now almost back to prior baseline.  Suspect prerenal in the setting of low oncotic pressure; responsive to albumin now back to baseline.  Creatinine back to baseline, last albumin was 2/12. Stable creatinine without albumin for many days with stable lower extremity edema on 2 L fluid restriction.  - Cr daily, stable  - 2L fluid restriction but still encouraging her to drink enough; has had good intake  - renal consulted, appreciate recs, have since signed off but should follow up outpatient given CKD     # Chronic bilateral lower extremity edema  # Deconditioning  # Sacral pressure ulcer, POA, worsening   # Severe malnutrition in the context of chronic illness   Hx of chronic edema, no PTA diuretics. Echo 11/24 reassuring EF but poor study, cant comment on diastolic function, edema likely due to some degree of proteinuria and low oncotic pressure with low albumin/malnutrition.  Last hospitalization recommendation was to transitional care unit last hospitalization, however per patient preference and for her mental health discharged to home with home care and family support in place. Unfortunately symptoms continued to progress at home.  Improved swelling with therapies as below  - Followed now by  lymphedema, WOC RN, PT,  RD   - Social work consulted re:placement/discharge planning   - 2L fluid restriction  - Relayed daughter's encourage to PT and OT to call daughter if patient is not participating in therapies  - Care conference 2/18 (see above)    #Chronic Pain  #?Acute on Chronic Pain  Patient reporting thigh, low back pain.  Continues to report moderate to severe pain at times despite current regimen and recent adjustments.  Per nursing, patient often appears comfortable and then cries when they walk into the room.  Not utilizing IV dilaudid much at this point.  No sedation after 15mg doses.  Previously reported abdominal pain much better.  Sacral and thigh pain still uncontrolled per patient report.  Much improved pain on 12/16 based on my assessment, the daughters, and the nurse.  Pain continues to be much improved on oxy 20mg q4h prn.  - Stopped scheduled PO hydromorphone doses given sedation  - Increase oxycodone 15 mg q4h PRN (her home dose) --> 20mg q4h prn after discussion with palliative care on 2/15   - Stop IV Dilaudid as needed on 2/15  - Changed acetaminophen to 975 mg TID   - If patient is able to ever tolerate it, can consider lumbar CT to assess low back and bilateral proximal leg pain although not sure it would      #Hx of ESBL urosepsis and bacteremia  #Bladder dysfunction s/p cystostomy and suprapubic catheter  Recently admitted 11/26 - 12/8/2024 for ESBL urosepsis and bacteremia requiring ICU w/ L nephrostomy tube (removed 1/7) treated w/ ertapenem, returned 1/25-1/27 for concern for UTI but no clear infection at that time.  ID was consulted that admission and recommended avoiding frequent UA and Ucx checks in future unless patient w/ symptoms of UTI.   - UA/Ucx abnormal at admission, likely colonization, will continue to monitor off antibiotics   - may be overdue for suprapubic cath exchange - will look into this - patient reports this was changed about 1.5 weeks ago;  will confirm this with daughter as I can't find indication of this in the chart  - UA sample on 2/16 to assess for continued presence of blood (off suprapubic cath that was not replaced; not reliable for infectious assessment)     #Serous endometrial carcinoma  Follows w/ heme/onc through [place]. S/p SNEHA, BSO 07/2024 s/p cycle 3 carbo/taxel 10/15/2024, cycle 4 delayed in s/o recent admission, family ultimately decided to forgo any further treatment.   During care conference, it was expressed that, her cancer is likely to recur due to it being an aggressive type but when it will recur is unknown. However, should it recur, GynOnc team expressed that, because of her functional status (significant weakness) and malnutrition, treatment would not be recommended since that would worsen her health overall and benefits would not outweigh the risks.    Mouth sores/ulcers:  -Need to discuss etiology of mouth sores/ulcers with dental     *If given anxiolytic for CT scan, would give less than IV Ativan 1mg (somnolent following this size dose on 2/13)          Diet: Combination Diet Regular Diet Adult  Snacks/Supplements Adult: Other; Send chocolate Ensure Enlive w/ breakfast, strawberry Ensure+HP with dinner; With Meals  Fluid restriction 2000 ML FLUID    DVT Prophylaxis: DOAC on hold due to bleeding  Shahid Catheter: Not present  Lines: PRESENT             Cardiac Monitoring: None  Code Status: Full Code      Clinically Significant Risk Factors               # Hypoalbuminemia: Lowest albumin = 1.9 g/dL at 2/5/2025  2:52 PM, will monitor as appropriate     # Hypertension: Noted on problem list             # Moderate Malnutrition: based on nutrition assessment    # Financial/Environmental Concerns: unable to afford rent/mortgage (pt interested in resources/info on getting assistance paying rent, as well as applying for county benefits)         Social Drivers of Health            Disposition Plan     Medically Ready for  Discharge: Anticipated Tomorrow-- needing to optimize pain regimen for outpatient; logisitically, to arrange discharge to daughter's home, need to have all the appropriate equipment available (e.g. lift and hospital bed)           Maci Chandler MD  Hospitalist Service, GOLD TEAM 22  [facility]  Securely message with MyScreen (more info)  Text page via [facility] Paging/Directory   See signed in provider for up to date coverage information  ______________________________________________________________________    Interval History   This morning she is mentioning that her skin is itchy everywhere and wants me to scratch it. She wants me to uncross her legs- turns out they are not crossed.   Had care conference this afternoon (Joy (daughter) + Aranza + palliative care + OT + social work + OBGYN + hospitalist).      Physical Exam   Vital Signs: Temp: 99  F (37.2  C) Temp src: Oral BP: (!) 133/92 Pulse: 110   Resp: 18 SpO2: 100 % O2 Device: None (Room air)    Weight: 136 lbs 10.96 oz  Constitutional: Resting comfortably in bed. Leans to the side quite a bit over time, suspect due to trunk weakness  Head: Normocephalic. Atraumatic.   EENT: No conjunctival injection or icterus. Nares patent. Moist mucous membranes.   Cardiovascular: Warm.   2+ lower extremity edema bilaterally   Respiratory: Breathing comfortably on room air with normal respiratory rate and effort.   Gastrointestinal: Abdomen appears non-distended  Musculoskeletal: upper body has areas of muscle wasting especially prominent in the face + supraclavicular region  Skin: bilateral lower extremities with significant edema and areas of hyperpigmentation   Psychiatric: mentation appears normal based on limited discussion      Medical Decision Making   60 MINUTES SPENT BY ME on the date of service doing chart review, history, exam, documentation & further activities per the note.   + 60 minutes additional time for  advanced care planning- see above    Data     I have personally reviewed the following data over the past 24 hrs:    9.7  \   9.2 (L)   / 245     135 103 19.0 /  96   3.6 20 (L) 0.80 \

## 2025-02-18 NOTE — PROGRESS NOTES
PALLIATIVE CARE PROGRESS NOTE  [facility]     Patient Name: Alis Hartman  Date of Admission: 2/5/2025      Recommendations & Counseling     GOALS OF CARE:   Restorative and life-prolonging without limits    Participated in care conference today with Joy Cobian (daughter), Medicine, Gyn Onc, SW, OT and RN:  Medical summary: Reviewed Aranza's decline in recent months including frequent hospitalizations, decreasing mobility and oral intake. Explained how challenging it can be to improve nutrition and increase mobility after months of deconditioning. Shared concerns that Aranza will likely continue to decline in the coming months. Gyn Onc provided update that vaginal bleeding might represent recurrence of her endometrial cancer, but we need imaging to assess. If this cancer has recurred, Gyn Onc would not offer treatment at this point as this would likely cause more harm than benefit in Aranza's condition.  Decisional support: Outlined options of (1) continuing on current restorative/ life-prolonging path vs (2) comfort-focused/hospice care. Aranza and Joy were not interested in discussing hospice today and are clear that their goals are to increase nutrition and mobility at home. Discussed level of support Aranza will need at home including equipment and family present around the clock. Joy feels she can manage this with support from her kids, and that Aranza responds better to care from family than care at a facility. Set expectations that if Aranza continues to decline, it would be appropriate to reconsider hospice at that point.  Decisional capacity: Aranza was alert and participated in meeting, though noted delayed responses after receiving oxycodone. She clearly stated goals and preferences about her treatment. Aranza appears to have at least partial capacity for medical decision making and is appropriately relying on daughter for decisional  "support.    ADVANCE CARE PLANNING:  Previously completed HCD naming Aranza (dtr) as HCA, appears this was not dated and deemed \"invalid.\" Aranza confirmed today Joy is her surrogate decision maker. Joy requested help updating Aranza's HCD, appreciate unit SW support.  There is a POLST form on file, this was reviewed and current.  Code status: Full Code    MEDICAL MANAGEMENT:   #Pain, chronic, low back and abdominal after surgery in July. Per daughter and Chronic Pain provider Dr. Maru Bower [phone]     #Pain, L thigh \"hot\" worse with palpation of lumbar paraspinal soft tissue?  Dr. Bower notes Aranza does have radiation to thighs with paraspinal tissue exam.   Source of abdominal and thigh pain unclear, may be flare of chronic, but cannot rule out out cancer progression or worsening spinous process as cause of pain without imaging  #Pain, sacral/coccyx wound and B arm and leg wounds  #Sedation  Consider working up abdominal, low back radicular pain if appropriate/ patient agrees. Recommend IV dilaudid 0.5mg x1 prior to CT, additional 0.5mg available if needed to tolerate scan.  Added morphine gel to open wounds for pain control  Would not reschedule opioid given sedation with scheduled dilaudid  Discussed starting buprenorphine product today - daughter is in agreement with looking into this. Placed Pharm Liaison consult for [place] coverage.  Continue oxycodone 20 mg q4h PRN (increase from her home dose of 15mg), hold if sedated  Would not increase again if no evidence of acute reason for pain.   Continue acetaminophen to 975 mg TID  lidocaine patches to painful areas of abdomen and back  Chronic Pain Provider Dr. Bower requests Palliative follow-up of pain, given concern for cancer pain in addition to chronic pain.  Once plan is clear if this is reasonable, can place referral upon discharge.     #Anxiety, Generalized Anxiety  Unfortunately most anxiety short acting medications cause sedation " "(lorazepam, hydroxyzine, buspar). Briefly discussed duloxetine with daughter today, will follow up.    PSYCHOSOCIAL/SPIRITUAL:  Family daughter Joy    Palliative Care will continue to follow.     Kamilah De La Torre PA-C  [facility], Palliative Care  Securely message with the Vocera Web Console (learn more here) or  Text page via [facility] Paging/Directory      Assessment          Alis Hartman is a 69 year old female with a past medical history of cervical cancer s/p radiation, seriuos adenocarcinoma s/p SNEHA/BSO and cystotomy w/ suprapubic catheter placement 07/2024 and 3 cycles of carbo/taxol (10/2024), hx ESBL urosepsis and bacteremia, RNY gastric bypass, afib on apixaban, HTN, CKD stage 3, and recent admission 1/25-1/27 for AMS, hematuria, pyuria, presented on 2/5 with MADHU, hyponatremia, worsening BLE edema, progressive coccyx sores. Her hospital course has been complicated now by new and significant bleeding most likely from her vagina, now resolved. Gyn/Onc was consulted and recommended CT. She has been unable to get this CT due to inability to lay flat.      Interval History:     Multidisciplinary collaboration:  Reviewed notes from Medicine  Plan for care conference today    Notable medications:  Oxycodone 20mg q 4 hours PRN  100mg oxycodone =150OME in past 24 hours.     Patient/family narrative  Seen for care conference as above. Aranza is sitting up in bed, gradually would lean forward in bed almost falling over, stated she was \"listening.\" Somewhat delayed responses after receiving oxycodone. She endorsed ongoing pain over lower abdomen, did not have pain in legs at time of my visit but confirmed she has pain in left thigh at times. Nursing notes pain with even small movements including turns/repositioning.     Physical Exam:   Temp:  [98.3  F (36.8  C)-99  F (37.2  C)] 99  F (37.2  C)  Pulse:  [] 110  Resp:  [16-18] 18  BP: (122-133)/(83-92) 133/92  SpO2:  [100 %] 100 %  136 lbs 10.96 oz    Physical " Exam  GEN:  laying in bed, NAD  HEENT:  Normocephalic/atraumatic, no scleral icterus, no nasal discharge, mouth moist.  LUNGS: Nonlabored breathing.  EXT:  B LE edema .  Noted pain in L thigh not worse with palpation; no pain with light touch over bilateral lower extremities, sensation intact bilateral lower extremities  Neuro: delayed responses, seemingly alert and oriented to person/place/purpose, moving all extremities        Data Reviewed:     Labs unremarkable  No new imaging    Medical Decision Making       80 MINUTES SPENT BY ME on the date of service doing chart review, history, exam, documentation & further activities per the note.

## 2025-02-18 NOTE — PROGRESS NOTES
Care Management Follow Up    Length of Stay (days): 13    Expected Discharge Date: 02/21/2025     Concerns to be Addressed: discharge planning     Patient plan of care discussed at interdisciplinary rounds: Yes    Anticipated Discharge Disposition: Home     Anticipated Discharge Services: County Worker, Home Care    Elderly Waiver  : Lissa Paiz  Phone: [phone]  Fax: [phone]    Anticipated Discharge DME:  (TBD)    Patient/family educated on Medicare website which has current facility and service quality ratings: no (N/A at this time)  Education Provided on the Discharge Plan: Yes  Patient/Family in Agreement with the Plan: yes    Referrals Placed by CM/SW: Homecare  Private pay costs discussed: Not applicable    Discussed  Partnership in Safe Discharge Planning  document with patient/family: No     Handoff Completed: No, handoff not indicated or clinically appropriate    Additional Information:    Attended care conference. In attendance were patient, patient's daughter Melvinale, hospitalist, palliative, OT, and gyn/onc (via phone). Discussed patient's current medical status and trajectory. Discussed patient's lack of participation in therapies d/t pain. Discussed that the recommendation would be LTC or 24/7 assist at home. Please refer to other provider's notes for clinical update.    Patient and daughter have opted to bring patient home. Patient lives with two adult grandchildren and they confirmed that someone would be available 24/7. Patient's daughter lives five minutes away and will be available as needed. Discussed that family will need to come in for teaching, particularly with the lift if a lift device will be needed. Discussed that patient will discharge home once these things are in place.    PT/OT to evaluate for DME needed at discharge and communicate this with care management.    Next Steps:     [] Order necessary DME once necessary DME is known   [] Resume home care services  and ensure home care nursing is able to provide wound care/teach family wound care  [] Inform Lissa  of discharge plan and date  [] Issue IMM when discharge date is clear        KATHE TiradoW, LSW  8 Med Surg   [facility]  Phone: [phone]  Vocera: Searchable by name or group: 8 Med Surg Vocera

## 2025-02-18 NOTE — PLAN OF CARE
Goal Outcome Evaluation:  Pt refuses many cares due to pain, unable to change most dressings. Bedding changed, pt allowed minimal repositioning and mostly only allows repositions to one side.     Orientation:Aox4  Bowel: incontinent  Bladder: Suprapubic catheter   Pain: Oxycodone  Ambulation/Transfers: Not oob, ax2 with repositions  Blood sugars: n/a   Diet/ Liquids: Regular, poor appetite  Tubes/ Lines/ Drains: Port a cath  Tube Feeding: n/a   Oxygen: RA  Skin: Multiple wounds, mepilex to cover

## 2025-02-18 NOTE — PROGRESS NOTES
[facility]    Medicine Progress Note - Hospitalist Service, GOLD TEAM 22    Date of Admission:  2/5/2025    Assessment & Plan   Alis Hartman is a 69 year old woman admitted on 2/5/2025. 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    #Acute on Chronic anemia (multifactorial), stable  #Vaginal bleeding  #Gross Hematuria  #Iron Deficiency Anemia  Likely multifactorial 2/2 malignancy, renal disease, nutrition. Likely hemoconcentrated at admission, down to 7.5 and recent baseline appears near this.  Continues to have bleeding on 12/7 with hemoglobin down to 7.2.  UA with large blood but patient has bright red blood in opening of vagina on exam per WOC nurse.  Therefore, suspect vaginal source.  Vaginal bleeding present on digital exam with minimal tolerability from patient and No overt fungating mass or vaginal abnormality noted, possible nodularity in right vaginal apex vs agglutination per gyn-onc. Continued blood in depends; appears to be in vaginal canal per WOC assessment - GYN-ONC referral placed.  CT A/P with IV contrast ordered to assess for disease recurrence for prognostication discussions given new onset vaginal bleeding - per gyn-onc, likely etiology is atrophic vagina but she has a high risk for recurrence with this histology.  Patient unable to lie flat for CT imaging.  Hgb 5.9; 1 unit of pRBC ordered (consented daughter as patient somnolent from anxiolytic prior to CT scan) on 2/14.  No clear documentation of continued vaginal bleeding since stopping DOAC as of 2/14.  Normal  ferritin on while iron.  Iron studies possibly consistent with some component of iron deficiency with anemia of CKD/chronic disease.  Folate and B12 wnl.    - Hold DOAC for now 2/12 (discussed risk and benefit with Aranza on 2/12) given vaginal bleeding and anemia  - Monitor for vaginal bleeding; still having intermittent spotting  - Hgb daily; hgb goal >7 (already consented) - stable for multiple days  - Continue Ferrous Sulfate drops daily   - Discussed hematuria on recent UA's with urology team; since it is now microscopic, low suspicion this will have in contributing factor to her anemia overtime.  If patient desires, would recommend urology referral at discharge with possible cystoscopy for evaluation     # MDAHU on CKD3 - resolved  # hyponatremia, likely hypovolemic - resolved    Cr worsened on admission compared to prior, slowly worsening since 1/25. Overall studies suggest prerenal but iniitally didn't improve much with small IVF+ encouraging PO intake but this has been limited. History of  hydronephrosis related to her endometrial cancer s/p PNT removal, L just in December 2024. Cystatin C expectedly lower, GFR estimated to be <30. Hyponatremia has been worsening in the last few weeks in the setting of poor PO intake, now likely prerenal MADHU, poor nutrition. Renal US negative for hydronephrosis. Cr peaked at 1.44, now Cr and electrolytes improving on IV albumin, now almost back to prior baseline.  Suspect prerenal in the setting of low oncotic pressure; responsive to albumin now back to baseline.  Creatinine back to baseline, last albumin was 2/12. Stable creatinine without albumin for many days with stable lower extremity edema on 2 L fluid restriction.  - Cr daily, stable  - 2L fluid restriction but still encouraging her to drink enough; has had good intake  - renal consulted, appreciate recs, have since signed off but should follow up outpatient given CKD     # Chronic bilateral lower extremity edema  #  Deconditioning  # Sacral pressure ulcer, POA, worsening   # Severe malnutrition in the context of chronic illness   Hx of chronic edema, no PTA diuretics. Echo 11/24 reassuring EF but poor study, cant comment on diastolic function, edema likely due to some degree of proteinuria and low oncotic pressure with low albumin/malnutrition.  Last hospitalization recommendation was to transitional care unit last hospitalization, however per patient preference and for her mental health discharged to home with home care and family support in place. Unfortunately symptoms continued to progress at home.  Improved swelling with therapies as below  - Followed now by lymphedema, WOC RN, PT,  RD   - Social work consulted re:placement/discharge planning   - 2L fluid restriction  - Relayed daughter's encourage to PT and OT to call daughter if patient is not participating in therapies  - Care conference with daughter, patient, palliative, possibly gyn-onc at 2pm on 2/18 to come up with definitive discharge plan (TCU vs. Home)    #Chronic Pain  #?Acute on Chronic Pain  Patient reporting thigh, low back pain.  Continues to report moderate to severe pain at times despite current regimen and recent adjustments.  Per nursing, patient often appears comfortable and then cries when they walk into the room.  Not utilizing IV dilaudid much at this point.  No sedation after 15mg doses.  Previously reported abdominal pain much better.  Sacral and thigh pain still uncontrolled per patient report.  Much improved pain on 12/16 based on my assessment, the daughters, and the nurse.  Pain continues to be much improved on oxy 20mg q4h prn.  - Stopped scheduled PO hydromorphone doses given sedation  - Increase oxycodone 15 mg q4h PRN (her home dose) --> 20mg q4h prn after discussion with palliative care on 2/15   - Stop IV Dilaudid as needed on 2/15  - Changed acetaminophen to 975 mg TID   - If patient is able to ever tolerate it, can consider lumbar CT  to assess low back and bilateral proximal leg pain although not sure it would      #Hx of ESBL urosepsis and bacteremia  #Bladder dysfunction s/p cystostomy and suprapubic catheter  Recently admitted 11/26 - 12/8/2024 for ESBL urosepsis and bacteremia requiring ICU w/ L nephrostomy tube (removed 1/7) treated w/ ertapenem, returned 1/25-1/27 for concern for UTI but no clear infection at that time.  ID was consulted that admission and recommended avoiding frequent UA and Ucx checks in future unless patient w/ symptoms of UTI.   - UA/Ucx abnormal at admission, likely colonization, will continue to monitor off antibiotics   - may be overdue for suprapubic cath exchange - will look into this - patient reports this was changed about 1.5 weeks ago; will confirm this with daughter as I can't find indication of this in the chart  - UA sample on 2/16 to assess for continued presence of blood (off suprapubic cath that was not replaced; not reliable for infectious assessment)     #Serous endometrial carcinoma  Follows w/ heme/onc through [facility]. S/p SNEHA, BSO 07/2024 s/p cycle 3 carbo/taxel 10/15/2024, cycle 4 delayed in s/o recent admission, family ultimately decided to forgo any further treatment.     Will discuss etiology of mouth sores/ulcers with dental     *If given anxiolytic for CT scan, would give less than IV Ativan 1mg (somnolent following this size dose on 2/13)    Disposition: Home vs. TCU        Diet: Combination Diet Regular Diet Adult  Snacks/Supplements Adult: Other; Send chocolate Ensure Enlive w/ breakfast, strawberry Ensure+HP with dinner; With Meals  Fluid restriction 2000 ML FLUID    DVT Prophylaxis: Mechanical   Shahid Catheter: Not present  Lines: PRESENT      Port a Cath 02/05/25 Single Lumen Right Chest wall-Site Assessment: WDL      Cardiac Monitoring: None  Code Status: Full Code      Clinically Significant Risk Factors        # Hypokalemia: Lowest K = 3.3 mmol/L in last 2 days, will  replace as needed        # Hypoalbuminemia: Lowest albumin = 1.9 g/dL at 2/5/2025  2:52 PM, will monitor as appropriate     # Hypertension: Noted on problem list             # Moderate Malnutrition: based on nutrition assessment    # Financial/Environmental Concerns: unable to afford rent/mortgage (pt interested in resources/info on getting assistance paying rent, as well as applying for county benefits)         Social Drivers of Health            Disposition Plan     Medically Ready for Discharge: Anticipate 2 to 3 days; suspect home discharge with increased supports although possible TCU placement     Mukesh Bermudez DO, MPH  Internal Medicine-Pediatrics Hospitalist  Hospitalist Service, GOLD TEAM 22  [facility]  Securely message with [employer] (more info)  Text page via [facility] Paging/Directory   See signed in provider for up to date coverage information  ______________________________________________________________________    Interval History   Patient seen at bedside this morning.  Patient reports pain continues to be much improved after adjustment of her oxycodone a couple days ago.  Localizes it to the same areas as prior.  Continues to report that she wants to go home to her family.  She also reports a desire to get stronger and more functional and to where she is able to be getting out of bed again.  Reports stooling.      Physical Exam   Vital Signs: Temp: 98.8  F (37.1  C) Temp src: Oral BP: 122/83 Pulse: 105   Resp: 16 SpO2: 100 % O2 Device: None (Room air)    Weight: 136 lbs 10.96 oz    Gen: Alert, lying in bed, chronically ill appearing  HEENT: Normocephalic/atraumatic, sclera clear, edentulous, oropharynx with mildly dry mucous membranes, stable ulcer on upper and lower middle gums  Resp: Clear bilaterally anteriorly, easy work of breathing on room air, no wheezing, shallow breathing   CV: Regular rate and rhythm, no murmurs noted    Abd: soft, mildly tender,  nondistended  Ext: distal LE edema 2+ pitting (stable  Neuro: Alert and oriented x4, grossly non-focal but limited assessment    Medical Decision Making       Data     I have personally reviewed the following data over the past 24 hrs:    8.3  \   8.8 (L)   / 186     138 105 17.2 /  86   3.4 22 0.73 \

## 2025-02-19 ENCOUNTER — APPOINTMENT (OUTPATIENT)
Dept: OCCUPATIONAL THERAPY | Facility: CLINIC | Age: 70
End: 2025-02-19
Payer: MEDICARE

## 2025-02-19 PROCEDURE — 999N000125 HC STATISTIC PATIENT MED CONFERENCE < 30 MIN

## 2025-02-19 PROCEDURE — 250N000013 HC RX MED GY IP 250 OP 250 PS 637: Performed by: INTERNAL MEDICINE

## 2025-02-19 PROCEDURE — 99233 SBSQ HOSP IP/OBS HIGH 50: CPT | Performed by: INTERNAL MEDICINE

## 2025-02-19 PROCEDURE — 250N000013 HC RX MED GY IP 250 OP 250 PS 637: Performed by: STUDENT IN AN ORGANIZED HEALTH CARE EDUCATION/TRAINING PROGRAM

## 2025-02-19 PROCEDURE — 250N000011 HC RX IP 250 OP 636: Performed by: INTERNAL MEDICINE

## 2025-02-19 PROCEDURE — 250N000013 HC RX MED GY IP 250 OP 250 PS 637: Performed by: CLINICAL NURSE SPECIALIST

## 2025-02-19 PROCEDURE — 99233 SBSQ HOSP IP/OBS HIGH 50: CPT | Performed by: PHYSICIAN ASSISTANT

## 2025-02-19 PROCEDURE — 120N000002 HC R&B MED SURG/OB UMMC

## 2025-02-19 RX ORDER — HYDROMORPHONE HYDROCHLORIDE 1 MG/ML
0.5 INJECTION, SOLUTION INTRAMUSCULAR; INTRAVENOUS; SUBCUTANEOUS SEE ADMIN INSTRUCTIONS
Status: ACTIVE | OUTPATIENT
Start: 2025-02-19

## 2025-02-19 RX ORDER — BENZOCAINE/MENTHOL 6 MG-10 MG
LOZENGE MUCOUS MEMBRANE 2 TIMES DAILY PRN
Status: DISPENSED | OUTPATIENT
Start: 2025-02-19

## 2025-02-19 RX ADMIN — SENNOSIDES AND DOCUSATE SODIUM 1 TABLET: 50; 8.6 TABLET ORAL at 08:49

## 2025-02-19 RX ADMIN — LIDOCAINE 4% 2 PATCH: 40 PATCH TOPICAL at 20:06

## 2025-02-19 RX ADMIN — OXYCODONE HYDROCHLORIDE 20 MG: 10 TABLET ORAL at 18:39

## 2025-02-19 RX ADMIN — SENNOSIDES AND DOCUSATE SODIUM 1 TABLET: 50; 8.6 TABLET ORAL at 20:09

## 2025-02-19 RX ADMIN — Medication 15 MG: at 09:04

## 2025-02-19 RX ADMIN — DIPHENHYDRAMINE HYDROCHLORIDE AND LIDOCAINE HYDROCHLORIDE AND ALUMINUM HYDROXIDE AND MAGNESIUM HYDRO 10 ML: KIT at 09:04

## 2025-02-19 RX ADMIN — Medication 1 TABLET: at 08:49

## 2025-02-19 RX ADMIN — Medication 5 ML: at 12:59

## 2025-02-19 RX ADMIN — OXYCODONE HYDROCHLORIDE 20 MG: 10 TABLET ORAL at 14:51

## 2025-02-19 RX ADMIN — OXYCODONE HYDROCHLORIDE 20 MG: 10 TABLET ORAL at 05:44

## 2025-02-19 RX ADMIN — DIPHENHYDRAMINE HYDROCHLORIDE AND LIDOCAINE HYDROCHLORIDE AND ALUMINUM HYDROXIDE AND MAGNESIUM HYDRO 10 ML: KIT at 12:56

## 2025-02-19 RX ADMIN — DIPHENHYDRAMINE HYDROCHLORIDE AND LIDOCAINE HYDROCHLORIDE AND ALUMINUM HYDROXIDE AND MAGNESIUM HYDRO 10 ML: KIT at 18:39

## 2025-02-19 RX ADMIN — ACETAMINOPHEN 975 MG: 325 TABLET, FILM COATED ORAL at 08:49

## 2025-02-19 RX ADMIN — ACETAMINOPHEN 975 MG: 325 TABLET, FILM COATED ORAL at 14:51

## 2025-02-19 RX ADMIN — ACETAMINOPHEN 975 MG: 325 TABLET, FILM COATED ORAL at 20:09

## 2025-02-19 RX ADMIN — OXYCODONE HYDROCHLORIDE 20 MG: 10 TABLET ORAL at 10:19

## 2025-02-19 ASSESSMENT — ACTIVITIES OF DAILY LIVING (ADL)
ADLS_ACUITY_SCORE: 76

## 2025-02-19 NOTE — PROGRESS NOTES
Care Management Follow Up    Length of Stay (days): 14    Expected Discharge Date: 02/21/2025     Concerns to be Addressed: discharge planning     Patient plan of care discussed at interdisciplinary rounds: Yes    Anticipated Discharge Disposition: Home (w/ 24/7 family support)     Anticipated Discharge Services: County Worker, Home Care  Anticipated Discharge DME:  (RNCC to order santo lift and hospital bed; pt has wheelchair and commode)    Patient/family educated on Medicare website which has current facility and service quality ratings: no (N/A at this time)  Education Provided on the Discharge Plan: Yes  Patient/Family in Agreement with the Plan: yes    Referrals Placed by CM/SW: Homecare  Private pay costs discussed: Not applicable    Discussed  Partnership in Safe Discharge Planning  document with patient/family: No     Handoff Completed: Not currently (EHO needed at discharge)    Additional Information:    Home Care Agency:  [facility] ([facility]  Ph: [phone]  Fx: [phone]  Services to be provided: RN/PT/OT/HHA  ______________________________________    10:00am - Case discussed in rounds this AM.  Pt/family have decided to pursue a discharge to home (see care team documentation from yesterday).  Per provider, the team may attempt to get a CT scan, however pt is essentially med-ready for discharge, pending acquisition of DME and caregiver training on use of santo lift.  Per Yolanda ROACH, a member of PT team will be seeing pt/family today and doing further eval, may make additional DME recs.  At present, documentation indicates a wheelchair w/ seat cushion and a santo lift are needed for discharge to home.    Reached out to Padmini ROACH, she stated that family training is scheduled for 1:00pm today.  She will update this writer to finalized DME recs following this session.  She advised using medical transport at discharge, as there are stairs to enter the home.    Prior to admission,  [facility] was providing pt w/ home care PT/OT/RN services.    11:58am - Reached out to [facility] liaison, inquired if pt's certification period is still open for [facility] to resume care.  If yes, inquired if HHA can be added and if RN can provide wound cares/caregiver training on wound care.  Stated IZABELLA 1-2 days.    1:02pm - Received confirmation from [facility] liaison that pt can receive RN/PT/OT/HHA from them and that RN can provide wound cares.  Liaison requested that wound care orders be included in discharge orders.  Liaison also requested that pt be provided w/ some supplies to hold over until [facility] can acquire more wound care supplies, and for pt's dtr to be taught the cares prior to discharge, which the HC RN can continue to reinforce post-discharge.    1:15pm - Received update from Padmini ROACH that pt's dtr has not come in yet for caregiver training.  Regarding size of equipment items, standard sizes are recommended.    Received update from Lesley LANG that she has not seen pt's dtr yet today.  Regarding DME, she stated a hospital bed may be beneficial, especially if pt would like to use a bedpan for toileting, as opposed to transferring to commode.    2:27pm - Called pt's dtr, Joy, introduced self and role, and discussed discharge planning.  She clarified that pt has a manual WC and commode available at home.  She stated they had a hospital bed, which was issued several months ago, but they returned it.  She reports that a hospital bed would be helpful for pt in the home.  She was unable to recall the DME company it came from, but will attempt to find it and update this writer tomorrow.  Explained that insurance only covers a hospital bed every 5 yrs, so it may be difficult to get this item back, Joy expressed understanding.  Informed her of the home care services that pt will receive post-discharge and that the HC RN will help to train family on wound cares, she expressed  "understanding, was in agreement.  Regarding caregiver training, she is available tomorrow after 1:00pm, or any time on Friday.  Confirmed pt's home address for DME delivery.  Joy stated she will be available to coordinate delivery with.    Updated PT and OT to Joy's availability, they will further coordinate caregiver training w/ her.    Received update from OT that caregiver training will be Friday at 1:30pm.    Per chart review, hospital bed came from [employer] (along w/ wheelchair).    Care mgmt received consult to provide HCD to pt/family.  Did not have opportunity to address this today, intend to do so prior to discharge.      Next Steps:  - Address HCD consult    - Place DME orders w/ [employer] via [employer] portal (can check w/ [employer] to see if they provided a seat cushion w/ previous wheelchair order); need to order hospital bed and santo lift    - Request nursing staff to provide wound care supplies at discharge, as well as teach pt's dtr how to perform the wound cares    - Discuss transport home (stretcher anticipated)    - Ensure discharge orders include specific orders to follow for completion of wound care        LUÍS Medrano  [facility]  Units 8M/S & 10 ICU  Reachable on [employer] - Search by name or search \"RNCC\"  Phone: [phone]  "

## 2025-02-19 NOTE — CONSULTS
Consulted to run a test claim for [employer].    Patient has pharmacy benefits through [employer] [employer]. Per insurance, the following are covered and preferred under the patient's plans:     Belbuca films - $0       Please feel free to contact me with any other test claims, prior authorizations, or insurance questions regarding outpatient medications.     Thanks!      Yoli Avendaño [facility]  Discharge Pharmacy Liaison  [facility]  Pronouns: She/Her/Hers    Securely message with [employer], [employer] Secure Chat, or [employer]  Phone: [phone]  Fax: [phone]  [email or web address]

## 2025-02-19 NOTE — PLAN OF CARE
Goal Outcome Evaluation:       Pt skin risk is increased because she will not repostion

## 2025-02-19 NOTE — PLAN OF CARE
"Goal Outcome Evaluation:  BP (!) 133/93 (BP Location: Left arm)   Pulse 104   Temp 98.5  F (36.9  C) (Oral)   Resp 18   Ht 1.6 m (5' 3\")   Wt 62 kg (136 lb 11 oz)   SpO2 100%   BMI 24.21 kg/m        Plan of Care Reviewed With: patient    Overall Patient Progress: no changeOverall Patient Progress: no change    Outcome Evaluation: A&Ox4 w/ periods of forgetfullness, on RA, encouraged elevation of feet    Suprapubic catheter patent, draining rahel cloudy urine w/ sediment, 2000 mL fluid restriction maintained, med for pain 10/10 with scheduled tylenol, medicated mouth pain with magic mouthwash. Dressings to bilateral lower extremities and elbows intact. Disposition to home care and medical equipment, continue plan of care.     Suprapubic cath cares done, dressing changed, scant brown drainage on old dressing.  "

## 2025-02-19 NOTE — PROGRESS NOTES
Shift [id number]:Pt admitted on 2/2/25 with MADHU,Hyponatremia, worsening BLE edema and progressive coccyx sores  Summary:Pt will return to TCU or home with family  Pt has a history of cervical cancer,Serous endometrial adenocarcinoma with SP catheter placement.  VS:       Pt A/O X 4. Afebrile. VS'S. Lungs- Clear bilaterally Denies nausea, shortness of breath, and chest pain.     Output:       Bowels- + in all four quadrants Last BM 2/12/25.Pt has adequate output from his SP catheter .      Activity:     Pt refuses turning every two hours. Pt can scoot herself into the sitting position   Pt  not OOB Uses the bedpan .  Repositioned every two hours, pt is refusing  Assist of 2 with repositioning and cares   Skin:   Wounds in the coccyx/sacrum and groin Scattered open wounds to BLE Severe edema to BLE-Lymphedema wraps removed yesterday and pt refused to have them put back on  Both legs elevated on pillows   Pain:       Has pain in the Coccyx and is managed with Oxycodone PRN   Pt has mouth pain and uses magic mouthwash   CMS:       CMS and Neuro's are intact. Denies numbness and tingling in all extremities.      Dressing:     Dressings CDI.   Mepilex to BLE. Bilateral elbows and Coccyx   Diet:       Pt is on a Regular diet  Pt is on a 2000 fluid restriction  Pt has a poor intake   LDA:       Pt has a  Right chest Rachael cath_Hep Locked.   Dressing change due 2/12/2   Equipment:        Bilateral heels are elevated off the bed.     Plan:       Pt is able to make needs known and the call light is within the pt's reach. Continue to monitor.       Additional Info:        .Pt refused to have cares or be turned. Pts daughter talked to and informed daughter that she does not want to move in bed. Pt was given Oxycodone Prn

## 2025-02-19 NOTE — PROGRESS NOTES
"  PALLIATIVE CARE PROGRESS NOTE  [facility]     Patient Name: Alis Hartman  Date of Admission: 2/5/2025      Recommendations & Counseling     GOALS OF CARE:   Life-prolonging without limits  See notes from care conference 2/19  Aranza is in agreement with re-attempting CT scan today, which could provide valuable prognostic information about possible cancer recurrence.    ADVANCE CARE PLANNING:  Previously completed HCD naming Aranza (dtr) as HCA, appears this was not dated and deemed \"invalid.\" Aranza confirmed today Joy is her surrogate decision maker. Joy requested help updating Aranza's HCD, appreciate unit SW support.  There is a POLST form on file, this was reviewed and current.  Code status: Full Code    MEDICAL MANAGEMENT:   #Pain, chronic, low back and abdominal after surgery in July.   #Pain, L thigh  #Pain, sacral/coccyx wound and B arm and leg wounds  #Sedation  Consider working up abdominal, low back radicular pain if appropriate/ patient agrees. Recommend IV dilaudid 0.5mg x1 prior to CT, additional 0.5mg available if needed to tolerate scan.  Buprenorphine would offer long acting pain relief with reduced side effects.  Belbuca covered with $0 copay.   Ideally would start Belbuca 150mcg q12h with goal of reducing PRN oxycodone back to home dose.  Discussed with Joy today, she wants to read more about Belbuca before starting.  Oxycodone 20 mg q4h PRN (increased from her home dose of 15mg) with hold parameters  Low threshold to reduce back to 15mg if there are concerns for increased sedation  Would not increase again if no evidence of acute reason for pain  Would not reschedule opioid given sedation with scheduled dilaudid  Tylenol 975 mg TID  Morphine gel to open wounds  Lidocaine patches to painful areas of abdomen and back  Follows with Chronic Pain provider Dr. Maru Bower [phone], our team discussed case with her on 2/17. She " "requests Palliative follow-up for pain given concern for cancer pain in addition to chronic pain.     #Anxiety, Generalized Anxiety  Unfortunately most anxiety short acting medications cause sedation (lorazepam, hydroxyzine, buspar). Briefly discussed duloxetine with daughter, she is hesitant to start new medications.    PSYCHOSOCIAL/SPIRITUAL:  Family daughter Joy    Palliative Care will continue to follow.     Kamilah De La Torre PA-C  [facility], Palliative Care  Securely message with the [employer] (learn more here) or  Text page via [facility] Paging/Directory      Assessment          Alis Hartman is a 69 year old female with a past medical history of cervical cancer s/p radiation, seriuos adenocarcinoma s/p SNEHA/BSO and cystotomy w/ suprapubic catheter placement 07/2024 and 3 cycles of carbo/taxol (10/2024), hx ESBL urosepsis and bacteremia, RNY gastric bypass, afib on apixaban, HTN, CKD stage 3, and recent admission 1/25-1/27 for AMS, hematuria, pyuria, presented on 2/5 with MADHU, hyponatremia, worsening BLE edema, progressive coccyx sores. Her hospital course has been complicated now by new and significant bleeding most likely from her vagina, now resolved. Gyn/Onc was consulted and recommended CT. She has been unable to get this CT due to inability to lay flat.      Interval History:     Multidisciplinary collaboration:  Discussed with Medicine, OT and RN in person    Notable medications:  Oxycodone 20mg q 4 hours PRN x5 past 24h  100mg oxycodone =150OME in past 24 hours     Patient/family narrative  Aranza is leaning forward in bed, alert and trying to tell me about her \"plan\" to try CT scan one more time. She described anxiety with scans and difficulty laying flat. When I asked if she has pain, Aranza said she needs someone to bring her a pain medication. When I asked where she has pain, she struggled to answer, then said her legs. Described \"stinging\" pain in bilateral legs which was worse last night. " "Unable to describe further.    Daughter Joy arrived and shared that Aranza called her crying at 3am and she had to come to the hospital, but Aranza has not been able to tell her what's wrong. Aranza became tearful and upset. I asked several questions about what was wrong and why she was upset, she did not answer but started crying again. At one point she mentioned being \"mistreated,\" but could not elaborate. Daughter expressed concern about mistreatment by staff and requested to speak with someone in administration. Notified nurse manager.     Physical Exam:   Temp:  [98.3  F (36.8  C)-98.5  F (36.9  C)] 98.5  F (36.9  C)  Pulse:  [] 104  Resp:  [16-18] 18  BP: (112-133)/(93-96) 133/93  SpO2:  [100 %] 100 %  136 lbs 10.96 oz    Physical Exam  GEN:  laying in bed, NAD  HEENT:  Normocephalic/atraumatic  LUNGS: Nonlabored breathing.  EXT:  B LE edema; no pain with light touch over bilateral lower extremities, sensation intact bilateral lower extremities  Neuro: delayed responses, seemingly alert and oriented to person/place/purpose, moving all extremities but with notable weakness (uses her arms to lift her legs to reposition in bed)  Psych: +anxious, tearful        Data Reviewed:     Labs unremarkable  No new imaging    Medical Decision Making       MANAGEMENT DISCUSSED with the following over the past 24 hours: Medicine, OT, RN   NOTE(S)/MEDICAL RECORDS REVIEWED over the past 24 hours: Medicine  Medical complexity over the past 24 hours:  - Prescription DRUG MANAGEMENT performed      "

## 2025-02-19 NOTE — PROGRESS NOTES
"[facility]    Medicine Progress Note - Hospitalist Service, GOLD TEAM 22    Date of Admission:  2/5/2025    Assessment & Plan   \"Aranza\" Alis Hartman is a 69 year old woman admitted on 2/5/2025. 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    Changes:   -Aranza is willing to try the CT scan- ordered with IV dilaudid available prior to scan  -Appreciate palliative care assistance with pain medications  -Given the 2/18 care conference, anticipating increased participation from Aranza re: rehab   --if unable to do this, will need repeat discussion    #Acute on Chronic anemia (multifactorial), stable  #Vaginal bleeding  #Gross Hematuria  #Iron Deficiency Anemia  Likely multifactorial 2/2 malignancy, renal disease, nutrition. Likely hemoconcentrated at admission, down to 7.5 and recent baseline appears near this.  Continues to have bleeding on 12/7 with hemoglobin down to 7.2.  UA with large blood but patient has bright red blood in opening of vagina on exam per WOC nurse.  Therefore, suspect vaginal source.  Vaginal bleeding present on digital exam with minimal tolerability from patient and No overt fungating mass or vaginal abnormality noted, possible nodularity in right vaginal apex vs agglutination per gyn-onc. Continued blood in depends; appears to be in vaginal canal per WOC assessment - GYN-ONC referral placed.  CT A/P with IV contrast ordered to assess for disease recurrence for prognostication discussions given new onset vaginal bleeding - per gyn-onc, likely etiology is atrophic vagina but " she has a high risk for recurrence with this histology.  Patient unable to lie flat for CT imaging.  Hgb 5.9; 1 unit of pRBC ordered (consented daughter as patient somnolent from anxiolytic prior to CT scan) on 2/14.  No clear documentation of continued vaginal bleeding since stopping DOAC as of 2/14.  Normal ferritin on while iron.  Iron studies possibly consistent with some component of iron deficiency with anemia of CKD/chronic disease.  Folate and B12 wnl.    - Hold DOAC for now 2/12 (discussed risk and benefit with Aranza on 2/12) given vaginal bleeding and anemia  - Monitor for vaginal bleeding; still having intermittent spotting  - Hgb daily; hgb goal >7 (already consented) - stable for multiple days  - Continue Ferrous Sulfate drops daily   - Discussed hematuria on recent UA's with urology team; since it is now microscopic, low suspicion this will have in contributing factor to her anemia overtime.  If patient desires, would recommend urology referral at discharge with possible cystoscopy for evaluation     Diffuse pruritus  Unclear etiology but would wonder if she has normal skin with abnormal sensation since there is no obvious dermatitis on areas she mentioned are itching  Recommended considering topical hydrocortisone on areas of itching    Recurrent hospitalizations with progressive step-wise decline  Severe malnutrition  Serous endometrial carcinoma  Advanced care planning Discussion  2/18/2025. I, Maci Chandler MD met with Aranza (patient) and her daughter (Joy) today at the hospital to discuss Advance Care Planning. Alis Hartman (Tanya) does have decisional capacity and was present for this discussion.  Those present were informed of the voluntary nature of this discussion and wished to proceed.  The discussion included: We discussed that during this current hospitalization, we have been able to temporize many of her medical problems.  We discussed that, as she and her daughter have  noticed, she has had a step-wise decline in her mobility with each hospitalization. This has not been helped by times where she has not wanted to participate in physical therapy (regardless of rationale). She is adamant about not going to a TCU due to a bad experience and unfortunately, she is not a TCU candidate at this time. Likewise, Aranza and Joy have not seen improvement in her appetite or ability to take on nutrition, partly worsened by her dislike for the hospital food.  We discussed that the swelling in her lower extremities is in part due to her lack of nutrition.  We discussed that her sacral wounds, while caused by lack of mobility, are unlikely to heal without adequate nutrition.  We discussed that typically, when somebody gets to this point, their health continues to decline.  We discussed that while there are ways to be aggressive with nutrition, there are typically costs (e.g. if tube feeding is pursued that can come with discomfort of tube in nose, abdominal fullness, diarrhea etc).  Even still, despite everything we do, the body may continue to decline.  Aranza and Joy made it clear throughout the conversation that their preference would be for Aranza to be at home, not only due to comfort but also due to improved participation with cares and therapies.  We discussed that if this option is pursued, it would require a lot of equipment and they are willing to try to make that work.  We also discussed that the alternative, would at this point be a long-term care facility.  They are aware.  Regarding her general outlook, OBGYN said it is difficult to provide more info.  She has been having vaginal bleeding which could either be vaginal atrophy (and bleeding has decreased with stopping blood thinner) or it could represent a recurrence of her endometrial cancer.  Her endometrial cancer is typically an aggressive type and likely will recur at some point according to OBGYN.  Should it recur, they said  they would not offer treatment as they are treatment options would worsen her health rather than improve it.  Based on this discussion we broached the topic of hospice but they were not interested in this option at this time.  This discussion began at 2 PM and ended at 3 PM for a total of 60 minutes.       # MADHU on CKD3 - resolved  # hyponatremia, likely intra-vascularly hypovolemic - resolved    Cr worsened on admission compared to prior, slowly worsening since 1/25. Overall studies suggest prerenal but iniitally didn't improve much with small IVF+ encouraging PO intake but this has been limited. History of  hydronephrosis related to her endometrial cancer s/p PNT removal, L just in December 2024. Cystatin C expectedly lower, GFR estimated to be <30. Hyponatremia has been worsening in the last few weeks in the setting of poor PO intake, now likely prerenal MADHU, poor nutrition. Renal US negative for hydronephrosis. Cr peaked at 1.44, now Cr and electrolytes improving on IV albumin, now almost back to prior baseline.  Suspect prerenal in the setting of low oncotic pressure; responsive to albumin now back to baseline.  Creatinine back to baseline, last albumin was 2/12. Stable creatinine without albumin for many days with stable lower extremity edema on 2 L fluid restriction.  - Cr daily, stable  - 2L fluid restriction but still encouraging her to drink enough; has had good intake  - renal consulted, appreciate recs, have since signed off but should follow up outpatient given CKD     # Chronic bilateral lower extremity edema  # Deconditioning  # Sacral pressure ulcer, POA, worsening   # Severe malnutrition in the context of chronic illness   Hx of chronic edema, no PTA diuretics. Echo 11/24 reassuring EF but poor study, cant comment on diastolic function, edema likely due to some degree of proteinuria and low oncotic pressure with low albumin/malnutrition.  Last hospitalization recommendation was to transitional care  unit last hospitalization, however per patient preference and for her mental health discharged to home with home care and family support in place. Unfortunately symptoms continued to progress at home.  Improved swelling with therapies as below  - Followed now by lymphedema, WOC RN, PT,  RD   - Social work consulted re:placement/discharge planning   - 2L fluid restriction  - Relayed daughter's encourage to PT and OT to call daughter if patient is not participating in therapies  - Care conference 2/18 (see above)    #Chronic Pain  #?Acute on Chronic Pain  Patient reporting thigh, low back pain.  Continues to report moderate to severe pain at times despite current regimen and recent adjustments.  Per nursing, patient often appears comfortable and then cries when they walk into the room.  Not utilizing IV dilaudid much at this point.  No sedation after 15mg doses.  Previously reported abdominal pain much better.  Sacral and thigh pain still uncontrolled per patient report.  Much improved pain on 12/16 based on my assessment, the daughters, and the nurse.  Pain continues to be much improved on oxy 20mg q4h prn.  - Stopped scheduled PO hydromorphone doses given sedation  - Increase oxycodone 15 mg q4h PRN (her home dose) --> 20mg q4h prn after discussion with palliative care on 2/15   - Stop IV Dilaudid as needed on 2/15  - Changed acetaminophen to 975 mg TID   - If patient is able to ever tolerate it, can consider lumbar CT to assess low back and bilateral proximal leg pain although not sure it would      #Hx of ESBL urosepsis and bacteremia  #Bladder dysfunction s/p cystostomy and suprapubic catheter  Recently admitted 11/26 - 12/8/2024 for ESBL urosepsis and bacteremia requiring ICU w/ L nephrostomy tube (removed 1/7) treated w/ ertapenem, returned 1/25-1/27 for concern for UTI but no clear infection at that time.  ID was consulted that admission and recommended avoiding frequent UA and Ucx checks in  future unless patient w/ symptoms of UTI.   - UA/Ucx abnormal at admission, likely colonization, will continue to monitor off antibiotics   - may be overdue for suprapubic cath exchange - will look into this - patient reports this was changed about 1.5 weeks ago; will confirm this with daughter as I can't find indication of this in the chart  - UA sample on 2/16 to assess for continued presence of blood (off suprapubic cath that was not replaced; not reliable for infectious assessment)     #Serous endometrial carcinoma  Follows w/ heme/onc through [facility]. S/p SNEHA, BSO 07/2024 s/p cycle 3 carbo/taxel 10/15/2024, cycle 4 delayed in s/o recent admission, family ultimately decided to forgo any further treatment.   During care conference, it was expressed that, her cancer is likely to recur due to it being an aggressive type but when it will recur is unknown. However, should it recur, GynOnc team expressed that, because of her functional status (significant weakness) and malnutrition, treatment would not be recommended since that would worsen her health overall and benefits would not outweigh the risks.    Mouth sores/ulcers:  -Need to discuss etiology of mouth sores/ulcers with dental     *If given anxiolytic for CT scan, would give less than IV Ativan 1mg (somnolent following this size dose on 2/13)            Diet: Combination Diet Regular Diet Adult  Snacks/Supplements Adult: Other; Send chocolate Ensure Enlive w/ breakfast, strawberry Ensure+HP with dinner; With Meals  Fluid restriction 2000 ML FLUID    DVT Prophylaxis: DOAC on hold due to bleeding  Shahid Catheter: Not present  Lines: PRESENT      Port a Cath 02/05/25 Single Lumen Right Chest wall-Site Assessment: WDL      Cardiac Monitoring: None  Code Status: Full Code      Clinically Significant Risk Factors               # Hypoalbuminemia: Lowest albumin = 1.9 g/dL at 2/5/2025  2:52 PM, will monitor as appropriate     # Hypertension: Noted on problem list              # Moderate Malnutrition: based on nutrition assessment    # Financial/Environmental Concerns: unable to afford rent/mortgage (pt interested in resources/info on getting assistance paying rent, as well as applying for county benefits)         Social Drivers of Health            Disposition Plan     Medically Ready for Discharge: ready now, awaiting logistical arrangements to get patient home with family (per their preference)             Maci Chandler MD  Hospitalist Service, GOLD TEAM 22  [facility]  Securely message with [employer] (more info)  Text page via [employer] Paging/Directory   See signed in provider for up to date coverage information  ______________________________________________________________________    Interval History   No events overnight. Was up at edge of bed with feet off edge of bed earlier today. Later in the day she is very focused on the itching on her legs, back. She initially was not willing to try CT scan but then when daughter was present was voicing to the palliative care team that she wanted to proceed with it. No fever/chills.    Physical Exam   Vital Signs: Temp: 98.5  F (36.9  C) Temp src: Oral BP: 131/80 Pulse: 104   Resp: 18 SpO2: 100 % O2 Device: None (Room air)    Weight: 136 lbs 10.96 oz  Constitutional: Resting comfortably in bed  Head: Normocephalic. Atraumatic.   EENT: No conjunctival injection or icterus. Nares patent. Moist mucous membranes.   Cardiovascular: Regular rate and rhythm. No murmurs, clicks, gallops or rubs. 2+ lower extremity edema bilaterally  Respiratory: Clear to auscultation without wheezes or crackles. Normal respiratory rate and effort.   Gastrointestinal: Abdomen appears non-distended  Musculoskeletal: significantly swollen lower extremities bilaterally  Skin: has diffuse itching on multiple areas of her skin. Skin has been moisturized and doesn't appear irritated (e.g. upper back). Does have some  exfoliation of skin on the distal fingers. Has multiple areas of hyperpigmentation on plantar aspects of feet      Medical Decision Making   60 MINUTES SPENT BY ME on the date of service doing chart review, history, exam, documentation & further activities per the note.      Data   No new labs

## 2025-02-20 ENCOUNTER — APPOINTMENT (OUTPATIENT)
Dept: OCCUPATIONAL THERAPY | Facility: CLINIC | Age: 70
End: 2025-02-20
Payer: MEDICARE

## 2025-02-20 VITALS
WEIGHT: 133.16 LBS | DIASTOLIC BLOOD PRESSURE: 81 MMHG | HEART RATE: 87 BPM | RESPIRATION RATE: 18 BRPM | HEIGHT: 63 IN | OXYGEN SATURATION: 98 % | SYSTOLIC BLOOD PRESSURE: 134 MMHG | TEMPERATURE: 97.3 F | BODY MASS INDEX: 23.59 KG/M2

## 2025-02-20 LAB
ANION GAP SERPL CALCULATED.3IONS-SCNC: 13 MMOL/L (ref 7–15)
BUN SERPL-MCNC: 26.6 MG/DL (ref 8–23)
CALCIUM SERPL-MCNC: 8.8 MG/DL (ref 8.8–10.4)
CHLORIDE SERPL-SCNC: 100 MMOL/L (ref 98–107)
CREAT SERPL-MCNC: 1.05 MG/DL (ref 0.51–0.95)
EGFRCR SERPLBLD CKD-EPI 2021: 57 ML/MIN/1.73M2
GLUCOSE SERPL-MCNC: 89 MG/DL (ref 70–99)
HCO3 SERPL-SCNC: 19 MMOL/L (ref 22–29)
POTASSIUM SERPL-SCNC: 4.6 MMOL/L (ref 3.4–5.3)
SODIUM SERPL-SCNC: 132 MMOL/L (ref 135–145)

## 2025-02-20 PROCEDURE — 97530 THERAPEUTIC ACTIVITIES: CPT | Mod: GO

## 2025-02-20 PROCEDURE — 97140 MANUAL THERAPY 1/> REGIONS: CPT | Mod: GO

## 2025-02-20 PROCEDURE — 250N000013 HC RX MED GY IP 250 OP 250 PS 637: Performed by: NURSE PRACTITIONER

## 2025-02-20 PROCEDURE — 80048 BASIC METABOLIC PNL TOTAL CA: CPT | Performed by: STUDENT IN AN ORGANIZED HEALTH CARE EDUCATION/TRAINING PROGRAM

## 2025-02-20 PROCEDURE — 250N000011 HC RX IP 250 OP 636: Performed by: INTERNAL MEDICINE

## 2025-02-20 PROCEDURE — 250N000013 HC RX MED GY IP 250 OP 250 PS 637: Performed by: INTERNAL MEDICINE

## 2025-02-20 PROCEDURE — 120N000002 HC R&B MED SURG/OB UMMC

## 2025-02-20 PROCEDURE — 82435 ASSAY OF BLOOD CHLORIDE: CPT | Performed by: STUDENT IN AN ORGANIZED HEALTH CARE EDUCATION/TRAINING PROGRAM

## 2025-02-20 PROCEDURE — 250N000013 HC RX MED GY IP 250 OP 250 PS 637: Performed by: CLINICAL NURSE SPECIALIST

## 2025-02-20 PROCEDURE — 99233 SBSQ HOSP IP/OBS HIGH 50: CPT | Performed by: INTERNAL MEDICINE

## 2025-02-20 PROCEDURE — 250N000013 HC RX MED GY IP 250 OP 250 PS 637: Performed by: STUDENT IN AN ORGANIZED HEALTH CARE EDUCATION/TRAINING PROGRAM

## 2025-02-20 PROCEDURE — 36591 DRAW BLOOD OFF VENOUS DEVICE: CPT | Performed by: STUDENT IN AN ORGANIZED HEALTH CARE EDUCATION/TRAINING PROGRAM

## 2025-02-20 RX ADMIN — OXYCODONE HYDROCHLORIDE 20 MG: 10 TABLET ORAL at 09:04

## 2025-02-20 RX ADMIN — HYDROCORTISONE: 1 CREAM TOPICAL at 12:14

## 2025-02-20 RX ADMIN — OXYCODONE HYDROCHLORIDE 20 MG: 10 TABLET ORAL at 20:22

## 2025-02-20 RX ADMIN — Medication 1 TABLET: at 09:03

## 2025-02-20 RX ADMIN — SENNOSIDES AND DOCUSATE SODIUM 1 TABLET: 50; 8.6 TABLET ORAL at 20:22

## 2025-02-20 RX ADMIN — Medication 15 MG: at 09:04

## 2025-02-20 RX ADMIN — OXYCODONE HYDROCHLORIDE 20 MG: 10 TABLET ORAL at 00:27

## 2025-02-20 RX ADMIN — ACETAMINOPHEN 975 MG: 325 TABLET, FILM COATED ORAL at 16:21

## 2025-02-20 RX ADMIN — Medication 1 MG: at 00:27

## 2025-02-20 RX ADMIN — OXYCODONE HYDROCHLORIDE 20 MG: 10 TABLET ORAL at 16:21

## 2025-02-20 RX ADMIN — SENNOSIDES AND DOCUSATE SODIUM 1 TABLET: 50; 8.6 TABLET ORAL at 09:03

## 2025-02-20 RX ADMIN — ACETAMINOPHEN 975 MG: 325 TABLET, FILM COATED ORAL at 09:03

## 2025-02-20 RX ADMIN — DIPHENHYDRAMINE HYDROCHLORIDE AND LIDOCAINE HYDROCHLORIDE AND ALUMINUM HYDROXIDE AND MAGNESIUM HYDRO 10 ML: KIT at 12:14

## 2025-02-20 RX ADMIN — DIPHENHYDRAMINE HYDROCHLORIDE AND LIDOCAINE HYDROCHLORIDE AND ALUMINUM HYDROXIDE AND MAGNESIUM HYDRO 10 ML: KIT at 16:45

## 2025-02-20 RX ADMIN — Medication 5 ML: at 12:14

## 2025-02-20 RX ADMIN — OXYCODONE HYDROCHLORIDE 20 MG: 10 TABLET ORAL at 05:06

## 2025-02-20 RX ADMIN — POLYETHYLENE GLYCOL 3350 17 G: 17 POWDER, FOR SOLUTION ORAL at 09:04

## 2025-02-20 RX ADMIN — DIPHENHYDRAMINE HYDROCHLORIDE AND LIDOCAINE HYDROCHLORIDE AND ALUMINUM HYDROXIDE AND MAGNESIUM HYDRO 10 ML: KIT at 09:03

## 2025-02-20 RX ADMIN — ACETAMINOPHEN 975 MG: 325 TABLET, FILM COATED ORAL at 21:00

## 2025-02-20 RX ADMIN — Medication 1 MG: at 21:00

## 2025-02-20 RX ADMIN — LIDOCAINE 4% 2 PATCH: 40 PATCH TOPICAL at 20:24

## 2025-02-20 ASSESSMENT — ACTIVITIES OF DAILY LIVING (ADL)
ADLS_ACUITY_SCORE: 75
ADLS_ACUITY_SCORE: 77
ADLS_ACUITY_SCORE: 76
ADLS_ACUITY_SCORE: 77
ADLS_ACUITY_SCORE: 76
ADLS_ACUITY_SCORE: 77
ADLS_ACUITY_SCORE: 77
ADLS_ACUITY_SCORE: 76
ADLS_ACUITY_SCORE: 76
ADLS_ACUITY_SCORE: 77
ADLS_ACUITY_SCORE: 77
ADLS_ACUITY_SCORE: 76
ADLS_ACUITY_SCORE: 77
ADLS_ACUITY_SCORE: 76
ADLS_ACUITY_SCORE: 75
ADLS_ACUITY_SCORE: 77
ADLS_ACUITY_SCORE: 76

## 2025-02-20 NOTE — PROGRESS NOTES
"[facility]    Medicine Progress Note - Hospitalist Service, GOLD TEAM 22    Date of Admission:  2/5/2025    Assessment & Plan   \"Aranza\" Alis Hartman is a 69 year old woman admitted on 2/5/2025. 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    Changes:   - Aranza has declined the CT scan on multiple occasions so we will not continue to pursue this  - Will continue to make arrangements to support family's wishes to get Aranza home with family    #Acute on Chronic anemia (multifactorial), stable  #Vaginal bleeding  #Gross Hematuria  #Iron Deficiency Anemia  Likely multifactorial 2/2 malignancy, renal disease, nutrition. Likely hemoconcentrated at admission, down to 7.5 and recent baseline appears near this.  Continues to have bleeding on 12/7 with hemoglobin down to 7.2.  UA with large blood but patient has bright red blood in opening of vagina on exam per [facility] nurse.  Therefore, suspect vaginal source.  Vaginal bleeding present on digital exam with minimal tolerability from patient and No overt fungating mass or vaginal abnormality noted, possible nodularity in right vaginal apex vs agglutination per gyn-onc. Continued blood in depends; appears to be in vaginal canal per WOC assessment - GYN-ONC referral placed.  CT A/P with IV contrast ordered to assess for disease recurrence for prognostication discussions given new onset vaginal bleeding - per gyn-onc, likely etiology is atrophic vagina but she has a high risk for recurrence with this histology.  Patient unable to lie flat for CT imaging.  Hgb " 5.9; 1 unit of pRBC ordered (consented daughter as patient somnolent from anxiolytic prior to CT scan) on 2/14.  No clear documentation of continued vaginal bleeding since stopping DOAC as of 2/14.  Normal ferritin on while iron.  Iron studies possibly consistent with some component of iron deficiency with anemia of CKD/chronic disease.  Folate and B12 wnl.    - Hold DOAC for now 2/12 (discussed risk and benefit with Aranza on 2/12) given vaginal bleeding and anemia  - Monitor for vaginal bleeding; still having intermittent spotting  - Hgb daily; hgb goal >7 (already consented) - stable for multiple days  - Continue Ferrous Sulfate drops daily   - Discussed hematuria on recent UA's with urology team; since it is now microscopic, low suspicion this will have in contributing factor to her anemia overtime.  If patient desires, would recommend urology referral at discharge with possible cystoscopy for evaluation     Diffuse pruritus  Unclear etiology but would wonder if she has normal skin with abnormal sensation since there is no obvious dermatitis on areas she mentioned are itching  Recommended considering topical hydrocortisone on areas of itching    Recurrent hospitalizations with progressive step-wise decline  Severe malnutrition  Serous endometrial carcinoma  Advanced care planning Discussion  2/18/2025. I, Maci Chandler MD met with Aranza (patient) and her daughter (Joy) today at the hospital to discuss Advance Care Planning. Alis (Francisco Hartman does have decisional capacity and was present for this discussion.  Those present were informed of the voluntary nature of this discussion and wished to proceed.  The discussion included: We discussed that during this current hospitalization, we have been able to temporize many of her medical problems.  We discussed that, as she and her daughter have noticed, she has had a step-wise decline in her mobility with each hospitalization. This has not been helped  by times where she has not wanted to participate in physical therapy (regardless of rationale). She is adamant about not going to a TCU due to a bad experience and unfortunately, she is not a TCU candidate at this time. Likewise, Aranza and Joy have not seen improvement in her appetite or ability to take on nutrition, partly worsened by her dislike for the hospital food.  We discussed that the swelling in her lower extremities is in part due to her lack of nutrition.  We discussed that her sacral wounds, while caused by lack of mobility, are unlikely to heal without adequate nutrition.  We discussed that typically, when somebody gets to this point, their health continues to decline.  We discussed that while there are ways to be aggressive with nutrition, there are typically costs (e.g. if tube feeding is pursued that can come with discomfort of tube in nose, abdominal fullness, diarrhea etc).  Even still, despite everything we do, the body may continue to decline.  Aranza and Joy made it clear throughout the conversation that their preference would be for Aranza to be at home, not only due to comfort but also due to improved participation with cares and therapies.  We discussed that if this option is pursued, it would require a lot of equipment and they are willing to try to make that work.  We also discussed that the alternative, would at this point be a long-term care facility.  They are aware.  Regarding her general outlook, OBGYELAINE said it is difficult to provide more info.  She has been having vaginal bleeding which could either be vaginal atrophy (and bleeding has decreased with stopping blood thinner) or it could represent a recurrence of her endometrial cancer.  Her endometrial cancer is typically an aggressive type and likely will recur at some point according to OBGYN.  Should it recur, they said they would not offer treatment as they are treatment options would worsen her health rather than improve it.   Based on this discussion we broached the topic of hospice but they were not interested in this option at this time.  Updates 2/19- initially said yes to CT and then later refused  2/20- declined CT scan. See OT/PT notes re: rehab details  2/21- anticipating family training for lift      # MADHU on CKD3 - resolved  # hyponatremia, likely intra-vascularly hypovolemic - resolved    Cr worsened on admission compared to prior, slowly worsening since 1/25. Overall studies suggest prerenal but iniitally didn't improve much with small IVF+ encouraging PO intake but this has been limited. History of  hydronephrosis related to her endometrial cancer s/p PNT removal, L just in December 2024. Cystatin C expectedly lower, GFR estimated to be <30. Hyponatremia has been worsening in the last few weeks in the setting of poor PO intake, now likely prerenal MADHU, poor nutrition. Renal US negative for hydronephrosis. Cr peaked at 1.44, now Cr and electrolytes improving on IV albumin, now almost back to prior baseline.  Suspect prerenal in the setting of low oncotic pressure; responsive to albumin now back to baseline.  Creatinine back to baseline, last albumin was 2/12. Stable creatinine without albumin for many days with stable lower extremity edema on 2 L fluid restriction.  - Cr daily, stable  - 2L fluid restriction but still encouraging her to drink enough; has had good intake  - renal consulted, appreciate recs, have since signed off but should follow up outpatient given CKD     # Chronic bilateral lower extremity edema  # Deconditioning  # Sacral pressure ulcer, POA, worsening   # Severe malnutrition in the context of chronic illness   Hx of chronic edema, no PTA diuretics. Echo 11/24 reassuring EF but poor study, cant comment on diastolic function, edema likely due to some degree of proteinuria and low oncotic pressure with low albumin/malnutrition.  Last hospitalization recommendation was to transitional care unit last  hospitalization, however per patient preference and for her mental health discharged to home with home care and family support in place. Unfortunately symptoms continued to progress at home.  Improved swelling with therapies as below  - Followed now by lymphedema, WOC RN, PT,  RD   - Social work consulted re:placement/discharge planning   - 2L fluid restriction  - Relayed daughter's encourage to PT and OT to call daughter if patient is not participating in therapies  - Care conference 2/18 (see above)    #Chronic Pain  #?Acute on Chronic Pain  Patient reporting thigh, low back pain.  Continues to report moderate to severe pain at times despite current regimen and recent adjustments.  Per nursing, patient often appears comfortable and then cries when they walk into the room.  Not utilizing IV dilaudid much at this point.  No sedation after 15mg doses.  Previously reported abdominal pain much better.  Sacral and thigh pain still uncontrolled per patient report.  Much improved pain on 12/16 based on my assessment, the daughters, and the nurse.  Pain continues to be much improved on oxy 20mg q4h prn.  - Stopped scheduled PO hydromorphone doses given sedation  - Increase oxycodone 15 mg q4h PRN (her home dose) --> 20mg q4h prn after discussion with palliative care on 2/15   - Stop IV Dilaudid as needed on 2/15  - Changed acetaminophen to 975 mg TID   - If patient is able to ever tolerate it, can consider lumbar CT to assess low back and bilateral proximal leg pain although not sure it would      #Hx of ESBL urosepsis and bacteremia  #Bladder dysfunction s/p cystostomy and suprapubic catheter  Recently admitted 11/26 - 12/8/2024 for ESBL urosepsis and bacteremia requiring ICU w/ L nephrostomy tube (removed 1/7) treated w/ ertapenem, returned 1/25-1/27 for concern for UTI but no clear infection at that time.  ID was consulted that admission and recommended avoiding frequent UA and Ucx checks in future  unless patient w/ symptoms of UTI.   - UA/Ucx abnormal at admission, likely colonization, will continue to monitor off antibiotics   - may be overdue for suprapubic cath exchange - will look into this - patient reports this was changed about 1.5 weeks ago; will confirm this with daughter as I can't find indication of this in the chart  - UA sample on 2/16 to assess for continued presence of blood (off suprapubic cath that was not replaced; not reliable for infectious assessment)     #Serous endometrial carcinoma  Follows w/ heme/onc through [facility]. S/p SNEHA, BSO 07/2024 s/p cycle 3 carbo/taxel 10/15/2024, cycle 4 delayed in s/o recent admission, family ultimately decided to forgo any further treatment.   During care conference, it was expressed that, her cancer is likely to recur due to it being an aggressive type but when it will recur is unknown. However, should it recur, GynOnc team expressed that, because of her functional status (significant weakness) and malnutrition, treatment would not be recommended since that would worsen her health overall and benefits would not outweigh the risks.    Mouth sores/ulcers:  -Need to discuss etiology of mouth sores/ulcers with dental     *If given anxiolytic for CT scan, would give less than IV Ativan 1mg (somnolent following this size dose on 2/13)            Diet: Combination Diet Regular Diet Adult  Snacks/Supplements Adult: Other; Send chocolate Ensure Enlive w/ breakfast, strawberry Ensure+HP with dinner; With Meals  Fluid restriction 2000 ML FLUID    DVT Prophylaxis: DOAC = on hold due to vaginal bleeding  Shahid Catheter: Not present  Lines: PRESENT      Port a Cath 02/05/25 Single Lumen Right Chest wall-Site Assessment: WDL      Cardiac Monitoring: None  Code Status: Full Code      Clinically Significant Risk Factors         # Hyponatremia: Lowest Na = 132 mmol/L in last 2 days, will monitor as appropriate       # Hypoalbuminemia: Lowest albumin = 1.9 g/dL at  2/5/2025  2:52 PM, will monitor as appropriate     # Hypertension: Noted on problem list             # Moderate Malnutrition: based on nutrition assessment    # Financial/Environmental Concerns: unable to afford rent/mortgage (pt interested in resources/info on getting assistance paying rent, as well as applying for county benefits)         Social Drivers of Health            Disposition Plan     Medically Ready for Discharge: now- family needing appropriate equipment and training and could be as soon as tomorrow           Maci Chandler MD  Hospitalist Service, GOLD TEAM 22  [facility]  Securely message with [employer] (more info)  Text page via [facility] Paging/Directory   See signed in provider for up to date coverage information  ______________________________________________________________________    Interval History   No fever/chills. Reportedly got to the chair by herself but she was unable / unwilling to the team how she got there. Tearful during the entire visit but did say she didn't want the CT scan.    Physical Exam   Vital Signs: Temp: 98.2  F (36.8  C) Temp src: Oral BP: (!) 138/92 Pulse: 102   Resp: 18 SpO2: 100 % O2 Device: None (Room air)    Weight: 133 lbs 2.53 oz  Constitutional: Sitting up comfortably in bed  Head: Normocephalic. Atraumatic.   EENT: Has conjunctival injection in her right eye. Nares patent. Moist mucous membranes.   Cardiovascular: Regular rate and rhythm. No murmurs, clicks, gallops or rubs. 2+ lower extremity edema bilaterally   Respiratory: Clear to auscultation without wheezes or crackles. Normal respiratory rate and effort.   Musculoskeletal: cachexia in upper extremities, face. Edema in lower extremities  Skin: some exfoliation of skin on feet and finger tips present  Psychiatric: tearful      Medical Decision Making   60 MINUTES SPENT BY ME on the date of service doing chart review, history, exam, documentation & further activities  per the note.      Data     I have personally reviewed the following data over the past 24 hrs:    N/A  \   N/A   / N/A     132 (L) 100 26.6 (H) /  89   4.6 19 (L) 1.05 (H) \

## 2025-02-20 NOTE — PLAN OF CARE
"Goal Outcome Evaluation:  BP (!) 138/92 (BP Location: Left arm)   Pulse 96   Temp 98.5  F (36.9  C) (Oral)   Resp 18   Ht 1.6 m (5' 3\")   Wt 60.4 kg (133 lb 2.5 oz)   SpO2 95%   BMI 23.59 kg/m      Overall Patient Progress: no changeOverall Patient Progress: no change  Med for mouth pain with scheduled magic mouthwash, med for leg/abdomen pain with prn oxycodone 10 mg q 4 h and scheduled tylenol. Cath right chest dressing intact, pt due for CT scan, pt became tearful when staff asked if pt was willing to have a CT and if pt would be able to lie on her back flat for 5 minutes, \"I can't do that right now\"  S/p dressing intact, SP draining cloudy rahel urine with sediment  Pt is on room air, tearful today, edema wraps in place, offered to reposition, pt refused to off load, pt heels on pillows. Pt ordered lunch, staff brought tray to room, pt began crying and stated \"Yuck!\" NST ordered second tray per pt request and when that arrived pt stated she did not want that. Pt stated she wanted \"extra hot-hot chocolate\" brought to patient, she stated \"That's not hot enough\" offered pt a 3rd tray of mashed potatoes with brown gravy. Pt refused, stating \"I'm not ready to eat yet\" Dispo to home tomorrow with DME and home care, continue plan of care          "

## 2025-02-20 NOTE — PROGRESS NOTES
Shift [id number]:Pt admitted on 2/2/25 with MADHU,Hyponatremia, worsening BLE edema and progressive coccyx sores  Summary:Pt will return to TCU or home with family  Pt has a history of cervical cancer,Serous endometrial adenocarcinoma with SP catheter placement.  VS:       Pt A/O X 4. Afebrile. VS'S. Lungs- Clear bilaterally Denies nausea, shortness of breath, and chest pain.     Output:       Bowels- + in all four quadrants Last BM 2/12/25.Pt has adequate output from his SP catheter .      Activity:     Pt refuses turning every two hours. Pt can scoot herself into the sitting position Pt has had micro-turns often  Pt  not OOB Uses the bedpan .  Repositioned every two hours, pt is refusing  Assist of 2 with repositioning and cares   Skin:   Wounds in the coccyx/sacrum and groin Scattered open wounds to BLE Severe edema to BLE-Lymphedema wraps removed yesterday and pt refused to have them put back on  Both legs elevated on pillows   Pain:       Has pain in the Coccyx and is managed with Oxycodone PRN   Pt has mouth pain and uses magic mouthwash   CMS:       CMS and Neuro's are intact. Denies numbness and tingling in all extremities.      Dressing:     Dressings CDI.   Mepilex to BLE. Bilateral elbows and Coccyx   Diet:       Pt is on a Regular diet  Pt is on a 2000 fluid restriction  Pt has a poor intake   LDA:       Pt has a  Right chest Rachael cath_Hep Locked.   Dressing change due 2/12/2   Equipment:        Bilateral heels are elevated off the bed.     Plan:       Pt is able to make needs known and the call light is within the pt's reach. Continue to monitor.       Additional Info:        .Pt refused to have cares or be turned. Pts daughter talked to and informed daughter that she does not want to move in bed. Pt was given Oxycodone Prn  Pt had a lot of calls overnight Staff tried to be there and sit in her room to help her loneliness

## 2025-02-20 NOTE — CONSULTS
Care Management Follow Up    Length of Stay (days): 15    Expected Discharge Date: 02/24/2025     Concerns to be Addressed: discharge planning     Patient plan of care discussed at interdisciplinary rounds: Yes    Anticipated Discharge Disposition: Home (w/ 24/7 family support)     Anticipated Discharge Services: County Worker, Home Care  Anticipated Discharge DME:  (RN[facility] to order santo lift and hospital bed; pt has wheelchair and commode)    Patient/family educated on Medicare website which has current facility and service quality ratings: no (N/A at this time)  Education Provided on the Discharge Plan: Yes  Patient/Family in Agreement with the Plan: yes    Referrals Placed by CM/SW: Homecare  Private pay costs discussed: Not applicable    Discussed  Partnership in Safe Discharge Planning  document with patient/family: No     Handoff Completed: Not currently (EHO needed at discharge)    Additional Information:    Home Care Agency:  [facility] ([facility]  Ph: [phone]  Fx: [phone]  Services to be provided: RN/PT/OT/HHA  ______________________________________    Case discussed w/ provider this AM.  Provider explained that CT will be attempted.  In the event CT shows recurrence of cancer, additional goals of care discussion may need to take place.  Additionally, provider has some concerns about discharge to home, so would like to have further discussion w/ pt/family to ensure discharge to home is how they would like to proceed at this time.  Regarding DME ordering, this writer will postpone until provider has next discussion on discharge disposition - if pt/family maintain plan to discharge to home, will then proceed w/ ordering DME.  In the event CT scan yields additional discharge considerations, care mgmt will address accordingly.  Provider aware that pt's dtr will be on site tomorrow at 1:30pm for caregiver training on the use of a santo lift.    Met w/ pt at bedside, provided blank HCD  "and pen.  Advised pt to fill out HCD and not sign it until it can be witnessed/notarized.  Pt expressed understanding, stated she has already been educated on HCD.  Pt requested this writer to notify her nurse that her lunch order is wrong.    Notified RN of pt's concern regarding lunch order.      Next Steps:  - Follow up w/ pt on her progress in completing HCD    - In the event pt/family maintain plan to discharge to home:  Place DME orders w/ [employer] via [employer] portal (can check w/ [employer] to see if they provided a seat cushion w/ previous wheelchair order); need to order hospital bed and santo lift  Request nursing staff to provide wound care supplies at discharge, as well as teach pt's dtr how to perform the wound cares  Discuss transport home (stretcher anticipated)  Ensure discharge orders include specific orders to follow for completion of wound care  Send orders to [facility] at discharge        LUÍS Medrano  [facility]  Units 8M/S & 10 ICU  Reachable on [employer] - Search by name or search \"[facility]\"  Phone: [phone]  "

## 2025-02-20 NOTE — CARE PLAN
Pt observed sitting in chair, pt self transferred without asking for assistance, reminded pt to use call light for assist with transfers

## 2025-02-20 NOTE — CARE PLAN
"Assisted pt back to bed with Ax2 and sera steady. BLE with weeping edema, pt tearful all morning, pt states \" I asked for some cold water and a man said I had to take beer\" reassured patient we would provide her with ice water, enc pt to drink high protein ensure. Dressings intact to sacrum, elbows, and BLE.BLE up on pillows with FOB raised   "

## 2025-02-20 NOTE — CONSULTS
SPIRITUAL HEALTH SERVICES Consult Note  I met with Aranza this afternoon. She was in tears when I arrived. She let me know that she felt mistreated by staff overnight that they did not bring her the food or water that she asked for. While I was there I saw that she had a dietary tray with jello, a protein shake and hot chocolate as well as some water.     Pt said she had been in contact with her daughter, but that she did not tell her daughter about these concerns.     Following my visit I reported these concerns to charge RN to follow up on and have additional conversations to understand what pt is experiencing or feeling today.       Bernabe Preston M.Div.  Associate

## 2025-02-21 LAB
ANION GAP SERPL CALCULATED.3IONS-SCNC: 14 MMOL/L (ref 7–15)
BASE EXCESS BLDV CALC-SCNC: -2.3 MMOL/L (ref -3–3)
BUN SERPL-MCNC: 29.8 MG/DL (ref 8–23)
CALCIUM SERPL-MCNC: 8.7 MG/DL (ref 8.8–10.4)
CHLORIDE SERPL-SCNC: 100 MMOL/L (ref 98–107)
CREAT SERPL-MCNC: 1.18 MG/DL (ref 0.51–0.95)
CYSTATIN C (ROCHE): 1.9 MG/L (ref 0.6–1)
EGFRCR SERPLBLD CKD-EPI 2021: 49 ML/MIN/1.73M2
GFR/BSA.PRED SERPLBLD CYS-BASED-ARV: 30 ML/MIN/1.73M2
GLUCOSE SERPL-MCNC: 92 MG/DL (ref 70–99)
HCO3 BLDV-SCNC: 23 MMOL/L (ref 21–28)
HCO3 SERPL-SCNC: 20 MMOL/L (ref 22–29)
HOLD SPECIMEN: NORMAL
O2/TOTAL GAS SETTING VFR VENT: 21 %
OXYHGB MFR BLDV: 34 % (ref 70–75)
PCO2 BLDV: 41 MM HG (ref 40–50)
PH BLDV: 7.36 [PH] (ref 7.32–7.43)
PO2 BLDV: 23 MM HG (ref 25–47)
POTASSIUM SERPL-SCNC: 4 MMOL/L (ref 3.4–5.3)
SAO2 % BLDV: 34.8 % (ref 70–75)
SODIUM SERPL-SCNC: 134 MMOL/L (ref 135–145)

## 2025-02-21 PROCEDURE — 80048 BASIC METABOLIC PNL TOTAL CA: CPT | Performed by: INTERNAL MEDICINE

## 2025-02-21 PROCEDURE — 250N000013 HC RX MED GY IP 250 OP 250 PS 637: Performed by: STUDENT IN AN ORGANIZED HEALTH CARE EDUCATION/TRAINING PROGRAM

## 2025-02-21 PROCEDURE — 99233 SBSQ HOSP IP/OBS HIGH 50: CPT | Performed by: INTERNAL MEDICINE

## 2025-02-21 PROCEDURE — 84132 ASSAY OF SERUM POTASSIUM: CPT | Performed by: INTERNAL MEDICINE

## 2025-02-21 PROCEDURE — 36591 DRAW BLOOD OFF VENOUS DEVICE: CPT | Performed by: INTERNAL MEDICINE

## 2025-02-21 PROCEDURE — 258N000003 HC RX IP 258 OP 636: Performed by: INTERNAL MEDICINE

## 2025-02-21 PROCEDURE — 82610 CYSTATIN C: CPT | Performed by: INTERNAL MEDICINE

## 2025-02-21 PROCEDURE — 250N000013 HC RX MED GY IP 250 OP 250 PS 637: Performed by: CLINICAL NURSE SPECIALIST

## 2025-02-21 PROCEDURE — 120N000002 HC R&B MED SURG/OB UMMC

## 2025-02-21 PROCEDURE — 250N000013 HC RX MED GY IP 250 OP 250 PS 637: Performed by: INTERNAL MEDICINE

## 2025-02-21 PROCEDURE — 82805 BLOOD GASES W/O2 SATURATION: CPT | Performed by: INTERNAL MEDICINE

## 2025-02-21 PROCEDURE — 99418 PROLNG IP/OBS E/M EA 15 MIN: CPT | Performed by: INTERNAL MEDICINE

## 2025-02-21 RX ADMIN — ACETAMINOPHEN 975 MG: 325 TABLET, FILM COATED ORAL at 13:22

## 2025-02-21 RX ADMIN — Medication 1 TABLET: at 08:48

## 2025-02-21 RX ADMIN — SENNOSIDES AND DOCUSATE SODIUM 1 TABLET: 50; 8.6 TABLET ORAL at 20:52

## 2025-02-21 RX ADMIN — OXYCODONE HYDROCHLORIDE 20 MG: 10 TABLET ORAL at 13:22

## 2025-02-21 RX ADMIN — DIPHENHYDRAMINE HYDROCHLORIDE AND LIDOCAINE HYDROCHLORIDE AND ALUMINUM HYDROXIDE AND MAGNESIUM HYDRO 10 ML: KIT at 16:54

## 2025-02-21 RX ADMIN — OXYCODONE HYDROCHLORIDE 20 MG: 10 TABLET ORAL at 05:05

## 2025-02-21 RX ADMIN — LIDOCAINE 4% 2 PATCH: 40 PATCH TOPICAL at 20:56

## 2025-02-21 RX ADMIN — DIPHENHYDRAMINE HYDROCHLORIDE AND LIDOCAINE HYDROCHLORIDE AND ALUMINUM HYDROXIDE AND MAGNESIUM HYDRO 10 ML: KIT at 08:48

## 2025-02-21 RX ADMIN — Medication 15 MG: at 09:19

## 2025-02-21 RX ADMIN — ACETAMINOPHEN 975 MG: 325 TABLET, FILM COATED ORAL at 08:48

## 2025-02-21 RX ADMIN — ACETAMINOPHEN 975 MG: 325 TABLET, FILM COATED ORAL at 20:52

## 2025-02-21 RX ADMIN — OXYCODONE HYDROCHLORIDE 20 MG: 10 TABLET ORAL at 00:53

## 2025-02-21 RX ADMIN — SODIUM CHLORIDE, POTASSIUM CHLORIDE, SODIUM LACTATE AND CALCIUM CHLORIDE 500 ML: 600; 310; 30; 20 INJECTION, SOLUTION INTRAVENOUS at 10:54

## 2025-02-21 RX ADMIN — DIPHENHYDRAMINE HYDROCHLORIDE AND LIDOCAINE HYDROCHLORIDE AND ALUMINUM HYDROXIDE AND MAGNESIUM HYDRO 10 ML: KIT at 13:22

## 2025-02-21 RX ADMIN — SENNOSIDES AND DOCUSATE SODIUM 1 TABLET: 50; 8.6 TABLET ORAL at 08:48

## 2025-02-21 RX ADMIN — OXYCODONE HYDROCHLORIDE 20 MG: 10 TABLET ORAL at 09:19

## 2025-02-21 ASSESSMENT — ACTIVITIES OF DAILY LIVING (ADL)
ADLS_ACUITY_SCORE: 75
ADLS_ACUITY_SCORE: 75
ADLS_ACUITY_SCORE: 74
ADLS_ACUITY_SCORE: 75
ADLS_ACUITY_SCORE: 74
ADLS_ACUITY_SCORE: 75
ADLS_ACUITY_SCORE: 75
ADLS_ACUITY_SCORE: 74
ADLS_ACUITY_SCORE: 74
ADLS_ACUITY_SCORE: 75
ADLS_ACUITY_SCORE: 74
ADLS_ACUITY_SCORE: 75
ADLS_ACUITY_SCORE: 75
ADLS_ACUITY_SCORE: 74

## 2025-02-21 NOTE — PLAN OF CARE
Goal Outcome Evaluation:                        VS: VSS and afebrile    O2: Stable in RA    Output: Adequate output in suprapubic cathetetr    Last BM: 2/20    Activity: Not OOB, repositioned q2Hrs   Skin: Wounds: coccyx, L leg, R calf and leg   Pain: Managed with Oxy and tylenol    Neuro: Alert and oriented x3   Dressing: Coccyx: intact,  Bilateral leg dressing: Changed this shift    Diet: Regular, ate 75% of dinner  On 2000 fluid restrictions    LDA: Port cath: R chest    Equipment: Cellphone and personal belongings at bedside   Plan: Home with home care: BANDAR sifuentes   Additional Info: 2000: removed lymphwrap per pt request    Whinning and tearful, needs a lot of reassurance and comfort.    Contact precaution maintained

## 2025-02-21 NOTE — PROGRESS NOTES
"[facility]    Medicine Progress Note - Hospitalist Service, GOLD TEAM 22    Date of Admission:  2/5/2025    Assessment & Plan   \"Aranza\" Alis Hartman is a 69 year old woman admitted on 2/5/2025. 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    Changes:   -unable to discharge today due to family being unable to do lift training and unable to get necessary equipment to the home  -MADHU, suspect due to dehydration- giving small amt of IV fluids  -Will place urology consult for removal and replacement of her suprapubic cathter placement as we would ideally like to collect a non-contaminated urine specimen to evaluate for UTI due to her new onset paranoia (although there are other possible causes as well)    Paranoia with suspected hallucinations  Aranza was nervous to share that a white male was in her room last night and at first he seemed nice but then started yelling at her and calling her \"bitch\" and telling her \"you need another drink.\" And she was so shaken up by this. She also reported a female being present (she wondered if it was his girlfriend). Says no medical team members were present when they were there. Says she is very scared about \"what is his next move.\" She said the man left independently (no one came and made him leave).   -reported this to nursing and will make sure unit stays secure    #Acute on Chronic anemia (multifactorial), stable  #Vaginal bleeding  #Gross Hematuria  #Iron Deficiency Anemia  Likely multifactorial 2/2 malignancy, renal disease, nutrition. Likely " hemoconcentrated at admission, down to 7.5 and recent baseline appears near this.  Continues to have bleeding on 12/7 with hemoglobin down to 7.2.  UA with large blood but patient has bright red blood in opening of vagina on exam per WO nurse.  Therefore, suspect vaginal source.  Vaginal bleeding present on digital exam with minimal tolerability from patient and No overt fungating mass or vaginal abnormality noted, possible nodularity in right vaginal apex vs agglutination per gyn-onc. Continued blood in depends; appears to be in vaginal canal per WOC assessment - GYN-ONC referral placed.  CT A/P with IV contrast ordered to assess for disease recurrence for prognostication discussions given new onset vaginal bleeding - per gyn-onc, likely etiology is atrophic vagina but she has a high risk for recurrence with this histology.  Patient unable to lie flat for CT imaging.  Hgb 5.9; 1 unit of pRBC ordered (consented daughter as patient somnolent from anxiolytic prior to CT scan) on 2/14.  No clear documentation of continued vaginal bleeding since stopping DOAC as of 2/14.  Normal ferritin on while iron.  Iron studies possibly consistent with some component of iron deficiency with anemia of CKD/chronic disease.  Folate and B12 wnl.    - Hold DOAC for now 2/12 (discussed risk and benefit with Aranza on 2/12) given vaginal bleeding and anemia  - Monitor for vaginal bleeding; still having intermittent spotting  - Hgb daily; hgb goal >7 (already consented) - stable for multiple days  - Continue Ferrous Sulfate drops daily   - Discussed hematuria on recent UA's with urology team; since it is now microscopic, low suspicion this will have in contributing factor to her anemia overtime.  If patient desires, would recommend urology referral at discharge with possible cystoscopy for evaluation     Diffuse pruritus  Unclear etiology but would wonder if she has normal skin with abnormal sensation since there is no obvious dermatitis  on areas she mentioned are itching  Recommended considering topical hydrocortisone on areas of itching    Recurrent hospitalizations with progressive step-wise decline  Severe malnutrition  Serous endometrial carcinoma  Advanced care planning Discussion  2/18/2025. I, Maci Chandler MD met with Aranza (patient) and her daughter (Joy) today at the hospital to discuss Advance Care Planning. Alis (Francisco Hartman does have decisional capacity and was present for this discussion.  Those present were informed of the voluntary nature of this discussion and wished to proceed.  The discussion included: We discussed that during this current hospitalization, we have been able to temporize many of her medical problems.  We discussed that, as she and her daughter have noticed, she has had a step-wise decline in her mobility with each hospitalization. This has not been helped by times where she has not wanted to participate in physical therapy (regardless of rationale). She is adamant about not going to a TCU due to a bad experience and unfortunately, she is not a TCU candidate at this time. Likewise, Aranza and Joy have not seen improvement in her appetite or ability to take on nutrition, partly worsened by her dislike for the hospital food.  We discussed that the swelling in her lower extremities is in part due to her lack of nutrition.  We discussed that her sacral wounds, while caused by lack of mobility, are unlikely to heal without adequate nutrition.  We discussed that typically, when somebody gets to this point, their health continues to decline.  We discussed that while there are ways to be aggressive with nutrition, there are typically costs (e.g. if tube feeding is pursued that can come with discomfort of tube in nose, abdominal fullness, diarrhea etc).  Even still, despite everything we do, the body may continue to decline.  Aranza and Joy made it clear throughout the conversation that their  preference would be for Aranza to be at home, not only due to comfort but also due to improved participation with cares and therapies.  We discussed that if this option is pursued, it would require a lot of equipment and they are willing to try to make that work.  We also discussed that the alternative, would at this point be a long-term care facility.  They are aware.  Regarding her general outlook, OBGYN said it is difficult to provide more info.  She has been having vaginal bleeding which could either be vaginal atrophy (and bleeding has decreased with stopping blood thinner) or it could represent a recurrence of her endometrial cancer.  Her endometrial cancer is typically an aggressive type and likely will recur at some point according to OBGYN.  Should it recur, they said they would not offer treatment as they are treatment options would worsen her health rather than improve it.  Based on this discussion we broached the topic of hospice but they were not interested in this option at this time.  Updates 2/19- initially said yes to CT and then later refused  2/20- declined CT scan. See OT/PT notes re: rehab details  2/21- Daughter Joy unable to attend lift training this afternoon. Equipment ordered but unlikely that she'll be able to get it over the weekend. Likely soonest she could discharge would be Monday     Face-to-Face for Santo Lift DME Order:  The patient requires the use of a santo lift to transfer from surface to surface, such as from bed to wheelchair, or wheelchair to commode.  Without the use of a santo lift, the patient would be bedbound.      Face-to-Face for Hospital Bed DME Order:  Hospital bed is required for body positioning, to allow for safe transfers to wheelchair and standing and frequent changes in body position, not feasible in an ordinary bed. She requires positioning of the body (in a way which cannot be accomplished by a normal bed) so that she can have offloading of pressure points  due to her sacral pressures ulcers which are expected to last greater than 1 month. She also requires positioning of the body (in a way which cannot be accomplished with a normal bed) to alleviate pain.     I, the undersigned, certify that the above prescribed DME items are medically necessary for this patient and are both reasonable and necessary in reference to accepted standards of medical practice in the treatment of this patient's condition and are not prescribed as a convenience.  Maci Chandler MD  Internal Medicine/Pediatrics Hospitalist      # MADHU on CKD3 - resolved, then worse as of 2/21  # hyponatremia, likely intra-vascularly hypovolemic - resolved  then worse as of 2/21   Cr worsened on admission compared to prior, slowly worsening since 1/25. Overall studies suggest prerenal but iniitally didn't improve much with small IVF+ encouraging PO intake but this has been limited. History of  hydronephrosis related to her endometrial cancer s/p PNT removal, L just in December 2024. Cystatin C expectedly lower, GFR estimated to be <30. Hyponatremia has been worsening in the last few weeks in the setting of poor PO intake, now likely prerenal MADHU, poor nutrition. Renal US negative for hydronephrosis. Cr peaked at 1.44, now Cr and electrolytes improving on IV albumin, now almost back to prior baseline.  Suspect prerenal in the setting of low oncotic pressure; responsive to albumin now back to baseline.  Creatinine back to baseline, last albumin was 2/12. Stable creatinine without albumin for many days with stable lower extremity edema on 2 L fluid restriction.  - Cr daily, stable  - 2L fluid restriction but still encouraging her to drink enough; has had good intake  - renal consulted, appreciate recs, have since signed off but should follow up outpatient given CKD   -2/20 and 2/20- creat rising- suspect due to pre-renal etiology due to poor PO intake. Provided small amt of IV fluid and will see labs on 2/22    #  Chronic bilateral lower extremity edema  # Deconditioning  # Sacral pressure ulcer, POA, worsening   # Severe malnutrition in the context of chronic illness   Hx of chronic edema, no PTA diuretics. Echo 11/24 reassuring EF but poor study, cant comment on diastolic function, edema likely due to some degree of proteinuria and low oncotic pressure with low albumin/malnutrition.  Last hospitalization recommendation was to transitional care unit last hospitalization, however per patient preference and for her mental health discharged to home with home care and family support in place. Unfortunately symptoms continued to progress at home.  Improved swelling with therapies as below  - Followed now by lymphedema, WOC RN, PT,  RD   - Social work consulted re:placement/discharge planning   - 2L fluid restriction  - Relayed daughter's encourage to PT and OT to call daughter if patient is not participating in therapies  - Care conference 2/18 (see above)    #Chronic Pain  #?Acute on Chronic Pain  Patient reporting thigh, low back pain.  Continues to report moderate to severe pain at times despite current regimen and recent adjustments.  Per nursing, patient often appears comfortable and then cries when they walk into the room.  Not utilizing IV dilaudid much at this point.  No sedation after 15mg doses.  Previously reported abdominal pain much better.  Sacral and thigh pain still uncontrolled per patient report.  Much improved pain on 12/16 based on my assessment, the daughters, and the nurse.  Pain continues to be much improved on oxy 20mg q4h prn.  - Stopped scheduled PO hydromorphone doses given sedation  - Increase oxycodone 15 mg q4h PRN (her home dose) --> 20mg q4h prn after discussion with palliative care on 2/15   - Stop IV Dilaudid as needed on 2/15  - Changed acetaminophen to 975 mg TID     #Hx of ESBL urosepsis and bacteremia  #Bladder dysfunction s/p cystostomy and suprapubic catheter  Recently admitted 11/26 -  12/8/2024 for ESBL urosepsis and bacteremia requiring ICU w/ L nephrostomy tube (removed 1/7) treated w/ ertapenem, returned 1/25-1/27 for concern for UTI but no clear infection at that time.  ID was consulted that admission and recommended avoiding frequent UA and Ucx checks in future unless patient w/ symptoms of UTI.   - UA/Ucx abnormal at admission, likely colonization, will continue to monitor off antibiotics   - may be overdue for suprapubic cath exchange - will look into this - patient reports this was changed about 1.5 weeks ago; will confirm this with daughter as I can't find indication of this in the chart  - UA sample on 2/16 to assess for continued presence of blood (off suprapubic cath that was not replaced; not reliable for infectious assessment)   --as of 2/21-Will place urology consult for removal and replacement of her suprapubic cathter placement as we would ideally like to collect a non-contaminated urine specimen to evaluate for UTI due to her new onset paranoia (although there are other possible causes as well)    #Serous endometrial carcinoma  Follows w/ heme/onc through [facility]. S/p SNEHA, BSO 07/2024 s/p cycle 3 carbo/taxel 10/15/2024, cycle 4 delayed in s/o recent admission, family ultimately decided to forgo any further treatment.   During care conference, it was expressed that, her cancer is likely to recur due to it being an aggressive type but when it will recur is unknown. However, should it recur, GynOnc team expressed that, because of her functional status (significant weakness) and malnutrition, treatment would not be recommended since that would worsen her health overall and benefits would not outweigh the risks.    Mouth sores/ulcers:  -Need to discuss etiology of mouth sores/ulcers with dental     *If given anxiolytic for CT scan, would give less than IV Ativan 1mg (somnolent following this size dose on 2/13)          Diet: Combination Diet Regular Diet Adult  Snacks/Supplements  Adult: Other; Send chocolate Ensure Enlive w/ breakfast, strawberry Ensure+HP with dinner; With Meals  Fluid restriction 2000 ML FLUID    DVT Prophylaxis: DOAC on hold due to vaginal bleeding  Shahid Catheter: Not present  Lines: PRESENT      Port a Cath 02/05/25 Single Lumen Right Chest wall-Site Assessment: WDL      Cardiac Monitoring: None  Code Status: Full Code      Clinically Significant Risk Factors         # Hyponatremia: Lowest Na = 132 mmol/L in last 2 days, will monitor as appropriate       # Hypoalbuminemia: Lowest albumin = 1.9 g/dL at 2/5/2025  2:52 PM, will monitor as appropriate     # Hypertension: Noted on problem list             # Moderate Malnutrition: based on nutrition assessment    # Financial/Environmental Concerns: unable to afford rent/mortgage (pt interested in resources/info on getting assistance paying rent, as well as applying for county benefits)         Social Drivers of Health            Disposition Plan     Medically Ready for Discharge: Ready Now             Maci Chandler MD  Hospitalist Service, GOLD TEAM 22  [facility]  Securely message with [employer] (more info)  Text page via [employer] Paging/Directory   See signed in provider for up to date coverage information  ______________________________________________________________________    Interval History   See above re: hallucinations. She was hesitant to share with me, was worried others would overhear. No fever/chills. She is tearful at times. Up in chair this afternoon.    Physical Exam   Vital Signs: Temp: 97.8  F (36.6  C) Temp src: Oral BP: (!) 144/90 (131/98) Pulse: 90   Resp: 18 SpO2: 100 % O2 Device: None (Room air)    Weight: 133 lbs 2.53 oz  Constitutional: Sitting up comfortably in chair  Head: Normocephalic. Atraumatic.   EENT: No conjunctival injection or icterus. Nares patent. Moist mucous membranes.   CV: warm, appears perfused  Resp: breathing comfortably on room  air  Gastrointestinal: Abdomen appears non-distended  Musculoskeletal: severely edematous lower legs  Skin: desquamation and exfoliation of skin in multiple areas on hands, legs  Psychiatric: paranoid and suspicious of others. Had hallucinations overnight      Medical Decision Making   75 MINUTES SPENT BY ME on the date of service doing chart review, history, exam, documentation & further activities per the note.      Data     I have personally reviewed the following data over the past 24 hrs:    N/A  \   N/A   / N/A     134 (L) 100 29.8 (H) /  92   4.0 20 (L) 1.18 (H) \

## 2025-02-21 NOTE — PLAN OF CARE
Occupational Therapy Discharge Summary    Reason for therapy discharge:    No further expectations of functional progress.    Progress towards therapy goal(s). See goals on Care Plan in [facility] electronic health record for goal details.  Goals not met.  Barriers to achieving goals:   limited tolerance for therapy.    Therapy recommendation(s):    Pt with very limited participation in therapy since admit. Have trialed various compression techniques to address B LE lymphedema, however pt is not tolerating compression and refuses.Rec home care OT for home safety evaluation and lymphedema consult, as pt and family are declining LTC. Family prefers to take pt home and assist with mobility and ADLs. Pt has been getting up and transferring to chair with nursing over past 2 days when desires to get up. Family training was set up to go over use of lift as needed for safety at home, however daughter did not show up for the set appointments so training has been canceled. Anticipate pt does better in home setting with family, and could continue w/ SPT once home if she is motivated as well. RN to complete lift training as needed with daughter. CC working on DME needs (pt has commode already). OT/PT will sign off at this time as no further skilled therapy indicated.

## 2025-02-21 NOTE — PLAN OF CARE
"Goal Outcome Evaluation:  Blood pressure 134/81, pulse 87, temperature 97.3  F (36.3  C), temperature source Oral, resp. rate 18, height 1.6 m (5' 3\"), weight 60.4 kg (133 lb 2.5 oz), SpO2 98%, not currently breastfeeding.  Pt alert, confusion and forgetful. Mood depress, pt crying and weeping frequently. Co pain 9-10/10 managed using PO 20 mg oxycodone. Pt is on scheduled L and R turn. On RA no SOB noted. Pt requested to take her ace wrap off during night time. Pt is on fluid restriction. Call light within pt's reach.         Plan of Care Reviewed With: patient    Overall Patient Progress: no changeOverall Patient Progress: no change       Problem: Adult Inpatient Plan of Care  Goal: Plan of Care Review  Description: The Plan of Care Review/Shift note should be completed every shift.  The Outcome Evaluation is a brief statement about your assessment that the patient is improving, declining, or no change.  This information will be displayed automatically on your shift  note.  Outcome: Progressing  Flowsheets (Taken 2/21/2025 0113)  Plan of Care Reviewed With: patient  Overall Patient Progress: no change  Goal: Patient-Specific Goal (Individualized)  Description: You can add care plan individualizations to a care plan. Examples of Individualization might be:  \"Parent requests to be called daily at 9am for status\", \"I have a hard time hearing out of my right ear\", or \"Do not touch me to wake me up as it startles  me\".  Outcome: Progressing         "

## 2025-02-21 NOTE — PROGRESS NOTES
"0900: Writer re-wrapped BLE with lymphedema wraps. Patient requested to get into chair. Writer and NST transferred patient to recliner chair by pivoting with gaitbelt. Chair alarm on and call light within reach. Patient educated to not get up by self and call when wanting to get back into bed.     1100: Pt called and requested nurse to come to room. Writer went to room and patient had started to take off lymphedema wraps and stated \"I do not want these on they are painful\", writer educated patient on the importance of keeping the lymphedema wraps on.  "

## 2025-02-21 NOTE — PLAN OF CARE
Physical Therapy Discharge Summary    Reason for therapy discharge:    No further expectations of functional progress.  No skilled needs for IP therapy.    Progress towards therapy goal(s). See goals on Care Plan in [facility] electronic health record for goal details.  Goals not met.  Barriers to achieving goals:   limited tolerance for therapy and family has not shown for family training at set times for several days in a row. Note that when pt wants to transfer she has been able to get up to chair ind as well as SPT w/ nursing, pt has not been SPT w/ therapy for several sessions continuously.    Therapy recommendation(s):    Continued therapy is recommended.  Rationale/Recommendations:  Would rec home care PT for home safety evaluation as pt and family are declining LTC. Primary recs based off what therapy has witnessed would be LTC or home w/ 24/7 assist lift based and use of WC as well as medical transportation into the home. Nursing aware of PT discharge and can go over sling placement and OH lift as needed but anticipate pt could continue w/ SPT once home if she is motivated as well. PT will sign off at this time.

## 2025-02-21 NOTE — PLAN OF CARE
"Goal Outcome Evaluation:      Plan of Care Reviewed With: patient    Overall Patient Progress: no changeOverall Patient Progress: no change         Patient admitted 2/5/25 progressive coccyx sores    VS: /87   Pulse 96   Temp 98.2  F (36.8  C) (Oral)   Resp 18   Ht 1.6 m (5' 3\")   Wt 60.4 kg (133 lb 2.5 oz)   SpO2 100%   BMI 23.59 kg/m       O2: >90% on RA no complaints of SOB or chest pain.   Output: Suprapubic catheter in place   Last BM: 2/19/25 - incontinence   Activity: A2 to pivot to chair. Patient sits up in bed and refuses repositioning    Skin: Wound to coccyx - mepilex in place  Wounds to BLE - Mepilexs in place with lymphedema wraps  Wounds to Bilateral elbows - mepilexs in place   Pain: 8/10 Oxycodone given q4h 20mg   CMS: Alert with intermittent confusion. Severe edema to BLE   Dressing: Mepilex to coccyx  Mepilexs to BLE wounds  Mepilexs to bilateral elbows   Diet: Reg diet - poor appetite - encouraged to eat meals   LDA: R chest power port - Needle de-accessed this shift due to not using  Suprapubic catheter - urology consulted for catheter exchange.   Equipment: Personal belongings   Plan: PT deferred [place] lift education to nursing due to family canceling appointment 3 days in a row. Nursing to educate family on rosetta lift and wound care prior to discharge once equipment delivered to home.    Additional Info:        "

## 2025-02-21 NOTE — PROGRESS NOTES
Care Management Follow Up    Length of Stay (days): 16    Expected Discharge Date: 02/24/2025     Concerns to be Addressed: discharge planning     Patient plan of care discussed at interdisciplinary rounds: Yes    Anticipated Discharge Disposition: Home (w/ 24/7 family support)     Anticipated Discharge Services: County Worker, Home Care  Anticipated Discharge DME:  (RNCC ordered santo lift [including large size sling w/ commode opening] and hospital bed w/ gel overlay; pt has wheelchair and commode at home)    Patient/family educated on Medicare website which has current facility and service quality ratings: no (N/A at this time)  Education Provided on the Discharge Plan: Yes  Patient/Family in Agreement with the Plan: yes    Referrals Placed by CM/SW: Homecare  Private pay costs discussed: Not applicable    Discussed  Partnership in Safe Discharge Planning  document with patient/family: No     Handoff Completed: Not currently (EHO needed at discharge)    Additional Information:    Home Care Agency:  [facility] ([facility])  Ph: [phone]  Fx: [phone]  Services to be provided: RN/PT/OT/HHA  ______________________________________    Per review of provider documentation from yesterday, pt declined CT scan and plan remains to discharge to home.    8:59am - Called [employer] company at [phone].  Spoke w/ Fela Calderón, she confirmed that a seat cushion was provided w/ pt's previous wheelchair order.  She stated that they cannot provide a hospital bed based on the order they received from a previous admission, it must be re-ordered.    Began placing DME order through [facility] portal.  Will need provider documentation.    10:00am - Case discussed in rounds this AM.  Caregiver training to be completed at 1:30pm today on use of santo.  Provider will complete face-to-face documentation for santo lift and hospital bed DME orders.  Provider reviewed the requested DME items on [facility] portal,  gave verbal order to proceed w/ order once her completed documentation is uploaded.    10:35am - Provider updated this writer that face-to-face documentation was added to yesterday's progress note.  Subsequently uploaded to [employer] portal and submitted order to [facility].  Order includes request to coordinate delivery w/ Joy (pt's dtr) ASAP, as pt is med-ready for discharge pending delivery of DME.    Received update from provider that pt is experiencing hallucinations.  Provider will also contact pt's dtr to provide updates and discuss discharge planning.  Bedside RN present during update.  Informed RN that when pt does discharge, pt's dtr should be taught how to do wound cares and pt should be sent w/ plenty of wound care supplies, RN acknowledged.  Despite the hallucinations, provider believes pt is still medically ready for discharge, provided care coordination requirements can be completed.    12:09pm - Received notification from [facility] liaison that she sees lymph wraps are being utilized in-hospital.  She stated that they do not have a lymph specialist w/ the home care branch servicing pt, so asked for this writer to follow up to see if the pt can be discharged w/ a plan for mgmt that family can assist with (i.e., compression stockings).    Reached out to provider about the above concern regarding lymphedema mgmt.  Provider clarified that the lymph wraps have been relatively limited in use, as pt often requests their removal.  Provider thinks pharmacological mgmt and OP mgmt of lymphedema would be more appropriate for this pt, post-discharge.    Notified [facility] liaison of provider's response regarding lymphedema tx.    DME portal indicates all orders are approved.  [employer] rep contacted Joy to coordinate delivery.  Rep reports that Joy said she will discuss further w/ Luwanna and call [employer] back.  Rep also stated that the bed previously dispensed was returned b/c pt did not like  "it.    Updated provider to the DME situation, provider acknowledged.    Received notification from unit staff that pt's dtr did not arrive for caregiver training.  Upon bedside RN reaching out, Joy stated that she had to  her son from school unexpectedly, and she was unable to commit to rescheduling to a 3:00pm training today (only alternative offered by PT team at this time).    Received notification from PT that they will be signing off and deferring to bedside RN for lift training.  PT stated they have notified the RN of this.    Updated provider to training cancellation and PT deferring to RN for training.  Of note, bedside nursing staff conducting training on santo may be more effective, given nursing staff flexibility may be greater than that of PT team flexibility, in terms of scheduling.  When pt's dtr does come in to hospital next, nursing staff can conduct santo training and provide education on wound cares.  At that point, provided DME was delivered to home, pt could discharge from a care mgmt perspective.      Next Steps:  - Follow up w/ pt on her progress in completing HCD    - Monitor [facility] portal for status of delivery of DME from [facility]    - Encourage Joy to come in for training/education on santo lift and wound cares    - Remind nursing staff to provide wound care supplies/education at discharge    - Discuss transport home (stretcher anticipated)    - Ensure discharge orders include specific orders to follow for completion of wound care    - Send orders to [facility] at discharge    - Address additional discharge needs that may arise          LUÍS Medrano  [facility]  Units 8M/S & 10 ICU  Reachable on [facility] - Search by name or search \"RNCC\"  Phone: [phone]  "

## 2025-02-21 NOTE — PROGRESS NOTES
CLINICAL NUTRITION SERVICES - REASSESSMENT NOTE     RECOMMENDATIONS FOR MDs/PROVIDERS TO ORDER:  None at this time    Malnutrition Status:    Severe malnutrition in the context of chronic illness    Registered Dietitian Interventions:  Continue current supplement orders.    Future/Additional Recommendations:  Monitor labs, stooling, weight trends, intakes, supplement acceptance  See pt early next week to check status of mouth sores and whether this continues to impacting po intake.      SUBJECTIVE INFORMATION  Patient not available for interview due to sleeping soundly during attempted visit . Noted per nursing charting, pt frequently weeping and crying out and sometimes making confused statements.     CURRENT NUTRITION ORDERS  Diet: Regular and 2000 mL Fluid Restriction  Ensure Enlive or Ensure+HP BID (chocolate w/ breakfast, strawberry with dinner    CURRENT INTAKE/TOLERANCE  Intake/Tolerance: Poor <50% per I/Os  Per [facility] meal order review: Pt has been ordering 3-5 trays per day with average daily orders including 2 Ensure at 2484 kcal and 111 g protein. Noted nursing has commented in past that pt orders large amounts of food but never able to eat much of it.  Suspect mouth still bothering her as pt often resorts back to ordering mashed potatoes and jello periodically.     NEW FINDINGS  Weight:   02/20/25 1100 60.4 kg (133 lb 2.5 oz) Bed scale   02/05/25 2314 62 kg (136 lb 11 oz) Bed scale   See 2/14 note for details of weight hx PTA.   Weight assessment: weight relatively stable since admission.   Upon admission: Net wt loss of 18.3% but true loss likely >> 20% with current 3+/4+ BLE  Difficult to  true weight loss within past year as confounded by stated weights and varying levels of edema.    Skin/wounds:  Per 2/18 WOCN note, wound on sacrum/coccyx stable and wounds on BLE are healing nicely .  Unable to examine mouth sores that appeared to be much worse with last week's visit compared to initial.  Noted provider mentioned 2/18 checking on etiology of mouth sores with dental but no update yet.     GI symptoms:  none noted per flowsheets  Last BM: 2/19 per RN   Bowel regimen: Scheduled    Nutrition-relevant labs: Reviewed   Latest Reference Range & Units 02/15/25 08:36 02/16/25 08:31 02/17/25 07:58 02/18/25 06:40 02/20/25 08:16 02/21/25 08:12   Sodium 135 - 145 mmol/L 139 139 138 135 132 (L) 134 (L)   Urea Nitrogen 8.0 - 23.0 mg/dL 16.9 16.5 17.2 19.0 26.6 (H) 29.8 (H)   Creatinine 0.51 - 0.95 mg/dL 0.74 0.71 0.73 0.80 1.05 (H) 1.18 (H)   Cystatin C 0.6 - 1.0 mg/L 1.5 (H)     1.9 (H)   GFR Calculated with Cystatin C >=60 mL/min/1.73m2 40 (L)     30 (L)   (L): Data is abnormally low  (H): Data is abnormally high    Nutrition-relevant medications: Reviewed  500 ml LR bolus today w/ worsening renal labs possibly related to diuresis/decreased po intake.    MALNUTRITION  % Intake: </= 50% for >/= 5 days (severe)  % Weight Loss: significant wt loss masked by continued anasarca/3-4+ BLE edema   Subcutaneous Fat Loss: Orbital: Mild per 2/14 RD visit  Muscle Loss: Wasting of the temples (temporalis muscle): Mild, Clavicles (pectoralis and deltoids): Severe, Shoulders (deltoids): Severe, Interosseous muscles: Mild, and Scapula (latissimus dorsi, trapezious, deltoids): Severe--per 2/14 RD visit  Fluid Accumulation/Edema: Severe, 4+ per nursing charting   Malnutrition Diagnosis: Severe malnutrition in the context of chronic illness  Malnutrition Present on Admission: Yes    EVALUATION OF THE PROGRESS TOWARD GOALS   Previous Goals  Patient to consume % of nutritionally adequate meal trays TID, or the equivalent with supplements/snacks.  Evaluation: Not progressing    Previous Nutrition Diagnosis  Malnutrition (undernutrition) related to h/o poor intake w/ increased needs w/ cancer dx as evidenced by severe muscle wasting and severe BLE edema.   Evaluation: Declining    NUTRITION DIAGNOSIS  Malnutrition  "(undernutrition) related to h/o poor intake w/ increased needs w/ cancer dx as evidenced by severe muscle wasting and severe BLE edema.     INTERVENTIONS  See nutrition interventions above    Goals  Patient to consume % of nutritionally adequate meal trays TID, or the equivalent with supplements/snacks.     Monitoring/Evaluation      Progress toward goals will be monitored and evaluated per policy.    Eli Castañeda RD, LD   6 & 8 Med/Surg RD  Mon-Fri Vocera: \"6 Med Surg Clinical Dietitian\" or \"8 Med Surg Clinical Dietitian\"  Weekend RD Vocera (Global): \"Weekend Holiday Clinical Dietitian\"          "

## 2025-02-22 LAB
ANION GAP SERPL CALCULATED.3IONS-SCNC: 17 MMOL/L (ref 7–15)
BASOPHILS # BLD AUTO: 0 10E3/UL (ref 0–0.2)
BASOPHILS NFR BLD AUTO: 0 %
BUN SERPL-MCNC: 29 MG/DL (ref 8–23)
CALCIUM SERPL-MCNC: 8.9 MG/DL (ref 8.8–10.4)
CHLORIDE SERPL-SCNC: 100 MMOL/L (ref 98–107)
CREAT SERPL-MCNC: 1.15 MG/DL (ref 0.51–0.95)
EGFRCR SERPLBLD CKD-EPI 2021: 51 ML/MIN/1.73M2
EOSINOPHIL # BLD AUTO: 0 10E3/UL (ref 0–0.7)
EOSINOPHIL NFR BLD AUTO: 0 %
ERYTHROCYTE [DISTWIDTH] IN BLOOD BY AUTOMATED COUNT: 20.4 % (ref 10–15)
GLUCOSE SERPL-MCNC: 77 MG/DL (ref 70–99)
HCO3 SERPL-SCNC: 16 MMOL/L (ref 22–29)
HCT VFR BLD AUTO: 30.3 % (ref 35–47)
HGB BLD-MCNC: 10.5 G/DL (ref 11.7–15.7)
IMM GRANULOCYTES # BLD: 0 10E3/UL
IMM GRANULOCYTES NFR BLD: 0 %
LYMPHOCYTES # BLD AUTO: 1.7 10E3/UL (ref 0.8–5.3)
LYMPHOCYTES NFR BLD AUTO: 22 %
MCH RBC QN AUTO: 30.7 PG (ref 26.5–33)
MCHC RBC AUTO-ENTMCNC: 34.7 G/DL (ref 31.5–36.5)
MCV RBC AUTO: 89 FL (ref 78–100)
MONOCYTES # BLD AUTO: 0.7 10E3/UL (ref 0–1.3)
MONOCYTES NFR BLD AUTO: 9 %
NEUTROPHILS # BLD AUTO: 5.2 10E3/UL (ref 1.6–8.3)
NEUTROPHILS NFR BLD AUTO: 68 %
NRBC # BLD AUTO: 0 10E3/UL
NRBC BLD AUTO-RTO: 0 /100
PLATELET # BLD AUTO: 352 10E3/UL (ref 150–450)
POTASSIUM SERPL-SCNC: 4.6 MMOL/L (ref 3.4–5.3)
RBC # BLD AUTO: 3.42 10E6/UL (ref 3.8–5.2)
SODIUM SERPL-SCNC: 133 MMOL/L (ref 135–145)
WBC # BLD AUTO: 7.7 10E3/UL (ref 4–11)

## 2025-02-22 PROCEDURE — 250N000013 HC RX MED GY IP 250 OP 250 PS 637: Performed by: STUDENT IN AN ORGANIZED HEALTH CARE EDUCATION/TRAINING PROGRAM

## 2025-02-22 PROCEDURE — 250N000013 HC RX MED GY IP 250 OP 250 PS 637: Performed by: INTERNAL MEDICINE

## 2025-02-22 PROCEDURE — 99232 SBSQ HOSP IP/OBS MODERATE 35: CPT | Performed by: INTERNAL MEDICINE

## 2025-02-22 PROCEDURE — 120N000002 HC R&B MED SURG/OB UMMC

## 2025-02-22 PROCEDURE — 250N000011 HC RX IP 250 OP 636: Performed by: NURSE PRACTITIONER

## 2025-02-22 PROCEDURE — 250N000013 HC RX MED GY IP 250 OP 250 PS 637: Performed by: CLINICAL NURSE SPECIALIST

## 2025-02-22 PROCEDURE — 80048 BASIC METABOLIC PNL TOTAL CA: CPT | Performed by: INTERNAL MEDICINE

## 2025-02-22 PROCEDURE — 36591 DRAW BLOOD OFF VENOUS DEVICE: CPT | Performed by: INTERNAL MEDICINE

## 2025-02-22 PROCEDURE — 82435 ASSAY OF BLOOD CHLORIDE: CPT | Performed by: INTERNAL MEDICINE

## 2025-02-22 PROCEDURE — 85025 COMPLETE CBC W/AUTO DIFF WBC: CPT | Performed by: INTERNAL MEDICINE

## 2025-02-22 RX ADMIN — DIPHENHYDRAMINE HYDROCHLORIDE AND LIDOCAINE HYDROCHLORIDE AND ALUMINUM HYDROXIDE AND MAGNESIUM HYDRO 10 ML: KIT at 16:25

## 2025-02-22 RX ADMIN — OXYCODONE HYDROCHLORIDE 20 MG: 10 TABLET ORAL at 16:23

## 2025-02-22 RX ADMIN — OXYCODONE HYDROCHLORIDE 20 MG: 10 TABLET ORAL at 02:37

## 2025-02-22 RX ADMIN — OXYCODONE HYDROCHLORIDE 20 MG: 10 TABLET ORAL at 20:41

## 2025-02-22 RX ADMIN — ONDANSETRON 4 MG: 4 TABLET, ORALLY DISINTEGRATING ORAL at 08:42

## 2025-02-22 RX ADMIN — OXYCODONE HYDROCHLORIDE 20 MG: 10 TABLET ORAL at 08:32

## 2025-02-22 RX ADMIN — LIDOCAINE 4% 2 PATCH: 40 PATCH TOPICAL at 19:39

## 2025-02-22 RX ADMIN — DIPHENHYDRAMINE HYDROCHLORIDE AND LIDOCAINE HYDROCHLORIDE AND ALUMINUM HYDROXIDE AND MAGNESIUM HYDRO 10 ML: KIT at 11:25

## 2025-02-22 RX ADMIN — ACETAMINOPHEN 975 MG: 325 TABLET, FILM COATED ORAL at 19:38

## 2025-02-22 RX ADMIN — SENNOSIDES AND DOCUSATE SODIUM 1 TABLET: 50; 8.6 TABLET ORAL at 19:38

## 2025-02-22 RX ADMIN — DIPHENHYDRAMINE HYDROCHLORIDE AND LIDOCAINE HYDROCHLORIDE AND ALUMINUM HYDROXIDE AND MAGNESIUM HYDRO 10 ML: KIT at 08:33

## 2025-02-22 ASSESSMENT — ACTIVITIES OF DAILY LIVING (ADL)
ADLS_ACUITY_SCORE: 74
ADLS_ACUITY_SCORE: 74
ADLS_ACUITY_SCORE: 73
ADLS_ACUITY_SCORE: 74
ADLS_ACUITY_SCORE: 73
ADLS_ACUITY_SCORE: 74
ADLS_ACUITY_SCORE: 74
ADLS_ACUITY_SCORE: 73
ADLS_ACUITY_SCORE: 74
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
## 2025-02-22 NOTE — PLAN OF CARE
"         VS: /84   Pulse 62  Temp 97.8  F (36.6  C) (Oral)   Resp 18   Ht 1.6 m (5' 3\")   Wt 60.4 kg (133 lb 2.5 oz)   SpO2 95%   BMI 23.59 kg/m        O2: >90% on RA no complaints of SOB or chest pain.   Output: Suprapubic catheter in place   Last BM: 2/19/25    Activity: A2 to pivot to chair and bed.    Skin: Wound to coccyx - mepilex in place  Wounds to BLE - Mepilexs in place with lymphedema wraps  Wounds to Bilateral elbows - mepilexs in place   Pain: Managed with Oxycodone 10 mg.   CMS: Alert with intermittent confusion. Severe edema to BLE   Dressing: Mepilex to coccyx  Mepilexs to BLE wounds  Mepilexs to bilateral elbows   Diet: Reg thin whole.       Equipment: Personal belongings   Plan: [facility] lift education and wound care education before discharge.                  "

## 2025-02-22 NOTE — PROGRESS NOTES
Care Management Follow Up    Length of Stay (days): 17    Expected Discharge Date: 02/22/2025     Concerns to be Addressed: discharge planning     Patient plan of care discussed at interdisciplinary rounds: No    Anticipated Discharge Disposition: Home (w/ 24/7 family support)              Anticipated Discharge Services: County Worker, Home Care  Anticipated Discharge DME:  (RNCC to order santo lift and hospital bed; pt has wheelchair and commode)    Patient/family educated on Medicare website which has current facility and service quality ratings: no (N/A at this time)  Education Provided on the Discharge Plan: Yes  Patient/Family in Agreement with the Plan: yes    Referrals Placed by CM/SW: Homecare  Private pay costs discussed: Not applicable    Discussed  Partnership in Safe Discharge Planning  document with patient/family: No     Handoff Completed: No, handoff not indicated or clinically appropriate    Additional Information:  Spoke to Daughter Joy, states she spoke with [facility] and agreed to the bed. Daughter states delivery is scheduled for Tuesday, she will call to confirm delivery and inform Nurse when she comes in today to see patient. RNCC informed daughter that she will need to be trained on lift as well, to let nurse know when she comes in as they will be training now. Daughter also stated her brother and her may be transporting patient home.    Spoke to bedside nurse informed that patient's daughter was coming this afternoon and would need to be trained on santo. Bedside nurse had already talked to patient's daughter this morning and discussed possible training, will speak with her again when she returns. Informed nurse of delivery of bed for Tuesday.     Day of discharge will need to assess if patient's daughter and son are able to bring patient or if patient will need alternative transportation as patient does not consistently assist in transfers.     Next Steps:   -Follow up w/ pt on her  progress in completing HCD     - Monitor [facility] portal for status of delivery of DME from [facility]- Confirm with daughter- currently states delivery set for Tuesday 2/25     - Verify Joy received training/education on santo lift and wound cares     - Remind nursing staff to provide wound care supplies/education at discharge     -  Day of verify how patient will be able to transport day of-. (Daughter states family able to transport) If patient is not participating in transfers stretcher may be anticipated.     - Ensure discharge orders include specific orders to follow for completion of wound care     - Send orders to [facility] at discharge     - Address additional discharge needs that may arise       Irais Barahona V, RN    2/22/2025  Nurse Coordinator      Social Work and Care Management Department       SEARCHABLE in [facility] - search CARE COORDINATOR       [place] & [place] ([phone]) Saturday & Sunday; ([phone]) [facility] Recognized Holidays     Units: 5A Onc 5201 - [phone] RNCC,  5A Onc 5220 thru 5240 RNCC, 5C OFFSERVICE [id number] RNCC & 5C OFF SERVICE [id number] RNCC       Units: 6B Vocera, 6C Card 6401 thru 6420 RNCC, 6C Card 6502 thru 6514 RNCC & 6C Card 6515 thru 6519 RNCC        Units: 7A SOT RNCC Vocera, 7B Med Surg Vocera, 7C Med Surg 7401 thru 7418 RNCC & 7C Med Surg 7502 thru 7521 RNCC       Units: 6A Vocera & 4A CVICU Vocera, 4C MICU Vocera, and 4E SICU Vocera         Units: 5 Ortho Vocera & 5 Med Surg Vocera        Units: 6 Med Surg Vocera & 8 Med Surg Vocera

## 2025-02-22 NOTE — PLAN OF CARE
"Goal Outcome Evaluation:      Plan of Care Reviewed With: patient    Overall Patient Progress: no changeOverall Patient Progress: no change         Patient admitted 2/5/25 progressive coccyx sores    VS: BP (!) 149/102 (BP Location: Right arm)   Pulse 63   Temp 97.3  F (36.3  C) (Oral)   Resp 18   Ht 1.6 m (5' 3\")   Wt 60.4 kg (133 lb 2.5 oz)   SpO2 93%   BMI 23.59 kg/m       O2: >90% on RA no complaints of SOB or chest pain.   Output: Suprapubic catheter in place   Last BM: 2/19/25 incontinence   Activity: A2 to pivot to chair. Patient sits up in bed and refuses repositioning    Skin: Wound to coccyx - mepilex in place  Wounds to BLE - Mepilexs in place   Wounds to Bilateral elbows - mepilexs in place   Pain: 9/10 Oxycodone given q4h 20 mg   CMS: Alert with intermittent confusion. Severe edema to BLE   Dressing: Mepilex to coccyx change due 2/23  Mepilexs to BLE wounds changed this shift due 2/24  Mepilexs to bilateral elbows   Diet: Regular, tolerating well. No complaints of nausea or vomiting.   LDA: R chest power port - Needle de-accessed 2/21  Suprapubic catheter - per urology do not attempt to exchange suprapubic catheter this evening and attempt tomorrow morning.   Equipment: Personal belongings   Plan: Nursing educate family on wound cares prior to discharge. Writer educated family on santo lift this shift. Possible discharge Tuesday when medical equipment delivered to home.   Additional Info:        "

## 2025-02-23 LAB
ALBUMIN UR-MCNC: 100 MG/DL
ANION GAP SERPL CALCULATED.3IONS-SCNC: 15 MMOL/L (ref 7–15)
APPEARANCE UR: ABNORMAL
BILIRUB UR QL STRIP: NEGATIVE
BUN SERPL-MCNC: 30 MG/DL (ref 8–23)
CALCIUM SERPL-MCNC: 8.8 MG/DL (ref 8.8–10.4)
CHLORIDE SERPL-SCNC: 99 MMOL/L (ref 98–107)
COLOR UR AUTO: ABNORMAL
CREAT SERPL-MCNC: 1.19 MG/DL (ref 0.51–0.95)
EGFRCR SERPLBLD CKD-EPI 2021: 49 ML/MIN/1.73M2
GLUCOSE SERPL-MCNC: 81 MG/DL (ref 70–99)
GLUCOSE UR STRIP-MCNC: NEGATIVE MG/DL
HCO3 SERPL-SCNC: 19 MMOL/L (ref 22–29)
HGB UR QL STRIP: ABNORMAL
KETONES UR STRIP-MCNC: NEGATIVE MG/DL
LEUKOCYTE ESTERASE UR QL STRIP: ABNORMAL
MUCOUS THREADS #/AREA URNS LPF: PRESENT /LPF
NITRATE UR QL: NEGATIVE
PH UR STRIP: 5.5 [PH] (ref 5–7)
POTASSIUM SERPL-SCNC: 4.3 MMOL/L (ref 3.4–5.3)
RBC URINE: >182 /HPF
SODIUM SERPL-SCNC: 133 MMOL/L (ref 135–145)
SP GR UR STRIP: 1.02 (ref 1–1.03)
UROBILINOGEN UR STRIP-MCNC: NORMAL MG/DL
WBC URINE: >182 /HPF

## 2025-02-23 PROCEDURE — 250N000011 HC RX IP 250 OP 636: Mod: JZ | Performed by: INTERNAL MEDICINE

## 2025-02-23 PROCEDURE — 36415 COLL VENOUS BLD VENIPUNCTURE: CPT | Performed by: INTERNAL MEDICINE

## 2025-02-23 PROCEDURE — 250N000013 HC RX MED GY IP 250 OP 250 PS 637: Performed by: INTERNAL MEDICINE

## 2025-02-23 PROCEDURE — 81001 URINALYSIS AUTO W/SCOPE: CPT | Performed by: INTERNAL MEDICINE

## 2025-02-23 PROCEDURE — 87088 URINE BACTERIA CULTURE: CPT | Performed by: INTERNAL MEDICINE

## 2025-02-23 PROCEDURE — 99207 PR NO CHARGE LOS: CPT | Performed by: UROLOGY

## 2025-02-23 PROCEDURE — 87186 SC STD MICRODIL/AGAR DIL: CPT | Performed by: INTERNAL MEDICINE

## 2025-02-23 PROCEDURE — 120N000002 HC R&B MED SURG/OB UMMC

## 2025-02-23 PROCEDURE — 80048 BASIC METABOLIC PNL TOTAL CA: CPT | Performed by: INTERNAL MEDICINE

## 2025-02-23 PROCEDURE — 250N000013 HC RX MED GY IP 250 OP 250 PS 637: Performed by: STUDENT IN AN ORGANIZED HEALTH CARE EDUCATION/TRAINING PROGRAM

## 2025-02-23 PROCEDURE — 0T2BX0Z CHANGE DRAINAGE DEVICE IN BLADDER, EXTERNAL APPROACH: ICD-10-PCS | Performed by: UROLOGY

## 2025-02-23 PROCEDURE — 99232 SBSQ HOSP IP/OBS MODERATE 35: CPT | Performed by: INTERNAL MEDICINE

## 2025-02-23 PROCEDURE — P9047 ALBUMIN (HUMAN), 25%, 50ML: HCPCS | Mod: JZ | Performed by: INTERNAL MEDICINE

## 2025-02-23 PROCEDURE — 250N000013 HC RX MED GY IP 250 OP 250 PS 637: Performed by: CLINICAL NURSE SPECIALIST

## 2025-02-23 RX ORDER — ALBUMIN (HUMAN) 12.5 G/50ML
50 SOLUTION INTRAVENOUS ONCE
Status: COMPLETED | OUTPATIENT
Start: 2025-02-23 | End: 2025-02-23

## 2025-02-23 RX ADMIN — OXYCODONE HYDROCHLORIDE 20 MG: 10 TABLET ORAL at 14:16

## 2025-02-23 RX ADMIN — OXYCODONE HYDROCHLORIDE 20 MG: 10 TABLET ORAL at 20:11

## 2025-02-23 RX ADMIN — DIPHENHYDRAMINE HYDROCHLORIDE AND LIDOCAINE HYDROCHLORIDE AND ALUMINUM HYDROXIDE AND MAGNESIUM HYDRO 10 ML: KIT at 08:46

## 2025-02-23 RX ADMIN — LIDOCAINE 4% 2 PATCH: 40 PATCH TOPICAL at 20:34

## 2025-02-23 RX ADMIN — OXYCODONE HYDROCHLORIDE 20 MG: 10 TABLET ORAL at 01:41

## 2025-02-23 RX ADMIN — ACETAMINOPHEN 975 MG: 325 TABLET, FILM COATED ORAL at 20:33

## 2025-02-23 RX ADMIN — OXYCODONE HYDROCHLORIDE 20 MG: 10 TABLET ORAL at 08:46

## 2025-02-23 RX ADMIN — ACETAMINOPHEN 975 MG: 325 TABLET, FILM COATED ORAL at 08:46

## 2025-02-23 RX ADMIN — SENNOSIDES AND DOCUSATE SODIUM 1 TABLET: 50; 8.6 TABLET ORAL at 20:33

## 2025-02-23 RX ADMIN — Medication 15 MG: at 08:46

## 2025-02-23 RX ADMIN — SENNOSIDES AND DOCUSATE SODIUM 1 TABLET: 50; 8.6 TABLET ORAL at 08:46

## 2025-02-23 RX ADMIN — ALBUMIN HUMAN 50 G: 0.25 SOLUTION INTRAVENOUS at 20:34

## 2025-02-23 ASSESSMENT — ACTIVITIES OF DAILY LIVING (ADL)
ADLS_ACUITY_SCORE: 74

## 2025-02-23 NOTE — PLAN OF CARE
Goal Outcome Evaluation:      Plan of Care Reviewed With: patient, child    Overall Patient Progress: no change    Pt is A&O to self & place, incontinent of bowel & catheter in place - patent and draining, Assist x2 w/ GB & walker. Pt continues to be tearful & calls frequently. PRN Oxycodone given x2 for pain.     No acute issues this shift. Call light within reach. Continue POC.    Temp: 98.9  F (37.2  C) Temp src: Oral BP: 116/82 Pulse: 90   Resp: 18 SpO2: 100 % O2 Device: None (Room air)

## 2025-02-23 NOTE — PLAN OF CARE
"Goal Outcome Evaluation:      Plan of Care Reviewed With: patient    Overall Patient Progress: no changeOverall Patient Progress: no change         Patient admitted 2/5/25 progressive coccyx sores    VS: /85 (BP Location: Left arm)   Pulse 64   Temp 98.9  F (37.2  C) (Oral)   Resp 18   Ht 1.6 m (5' 3\")   Wt 60.4 kg (133 lb 2.5 oz)   SpO2 98%   BMI 23.59 kg/m       O2: >90% on RA no complaints of SOB or chest pain.   Output: Suprapubic catheter in place   Last BM: 2/19/25   Activity: A2 to pivot to chair. Patient sits up in bed and refuses repositioning    Skin: Wound to coccyx  Wounds to BLE  Wounds to bilateral elbow   Pain: 9/10 PRN Oxycodone 20mg   CMS: Alert with intermittent confusion. Severe edema to BLE   Dressing: Mepilex to coccyx changed this shift due 2/26  Mepilexs to BLE wounds due 2/24  Mepilexs to bilateral elbows   Diet: Regular, tolerating well. No complaints of nausea or vomiting.   LDA: R chest power port - re-accessed this shift with 20 gauge and 3/4 length  Suprapubic catheter - catheter exchange this shift by Urology to get UA. UA collected this shift, renal ultrasound ordered, pt refused at this time to go to ultrasound.    Equipment: Personal belongings   Plan: Nursing educate family on wound cares prior to discharge. Writer educated family on santo lift this shift. Possible discharge Tuesday when medical equipment delivered to home.    Additional Info:     Patient very agitated and emotional towards staff today. Patient comforted and encouraged to participate in cares this shift patient continued to either yell at staff or ignore staff and not respond.        "

## 2025-02-23 NOTE — CONSULTS
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 An SPT was left in place post-op. At follow-up visit with Dr Monroy on 9/4/24, pt elected to continue with SPT because she felt it improved her QOL compared to her history of severe incontinence. She has been having monthly SPT exchanges by home health RNs without issues. She was also recently admitted in 11/2024 for sepsis due to likely urinary source and had a left PNT placed. The last time she was seen by Urology, plan was for monthly PNT exchanges and outpatient follow-up with Dr. Monroy regarding possible ureteral stricture vs. Voiding dysfunction. PNT has since been removed by IR in 12/2024 after passing clamp trial. Renal ultrasound after PNT removal on 2/7/25 without hydronephrosis.     Urology consulted today  for SPT exchange requested by primary team to obtain urine sample.     She is currently admitted for MADHU, hyponatremia, and worsening BLE edema, and worsening coccyx sores. MADHU suspected due to dehydration. No upper tract imaging during this admission. Cr 1.19 today (1.15 yesterday, baseline 0.80 5 days ago). Since admission, she has been afebrile, hemodynamically stable. UA with >182 WBCs, 41 RBC, +Nitritie +LE consistent with chronic catheter. No leukocytosis.           Past Medical History:     Past Medical History:   Diagnosis Date    Atrial fibrillation (H)     Depression     Hypertension      Malignant neoplasm of endocervix (H)     Tx with radiation    Near syncope 11/7/2024    Orthopnea 9/21/2024    Other chronic pain     Low back    Schizophrenia (H)     Urinary incontinence             Past Surgical History:     Past Surgical History:   Procedure Laterality Date    ABDOMINOPLASTY      BIOPSY CERVICAL, LOCAL EXCISION, SINGLE/MULTIPLE  10/26/2011    Procedure:BIOPSY CERVICAL, LOCAL EXCISION, SINGLE/MULTIPLE; EUA, cervical biopsies; Surgeon:BETTY TINEO; Location:[facility] OR    BIOPSY VAGINAL N/A 06/08/2021    Procedure: BIOPSY, VAGINA;  Surgeon: Dany Perez MD;  Location: [facility]    CYSTOSCOPY N/A 05/17/2024    Procedure: Cystoscopy;  Surgeon: Dany Perez MD;  Location: [facility]    CYSTOSTOMY, INSERT TUBE SUPRAPUBIC, COMBINED  07/12/2024    Procedure: Insertion of supra-pubic catheter;  Surgeon: Dany Perez MD;  Location: [facility]    DILATION AND CURETTAGE, WITH ULTRASOUND GUIDANCE N/A 05/17/2024    Procedure: Dilation and curettage of the uterus with drainage of uterine fluid under ultrasound guidance, lysis of vaginal adhesions;  Surgeon: Dany Perez MD;  Location: [facility]    EXAM UNDER ANESTHESIA PELVIC N/A 03/12/2020    Procedure: Exam under anesthsia, vaginal biopsies, possible CO2 laser of the vagina;  Surgeon: Lilliam Roy MD;  [facility]    GI SURGERY  2004    gastric bypass    HYSTERECTOMY TOTAL ABDOMINAL, BILATERAL SALPINGO-OOPHORECTOMY, COMBINED Bilateral 07/12/2024    Procedure: Diagnostic laparoscopy converted to exploratory laparotomy, total abdominal hysterectomy, bilateral salpingo-oophorectomy, lysis of adhesions >60 min;  Surgeon: Dany Perez MD;  Location: [facility]    INSERT PORT VASCULAR ACCESS N/A 08/26/2024    Procedure: Insert port vascular access;  Surgeon: Avery Gregory MD;  Location: [facility]    IR CHEST PORT PLACEMENT > 5 YRS OF AGE  08/26/2024    IR NEPHROSTOMY TUBE PLACEMENT LEFT  11/29/2024    LASER CO2 VAGINA N/A 03/02/2015    Procedure:  LASER CO2 VAGINA;  Surgeon: Mariela Abdalla MD;  Location: [facility]    LASER CO2 VAGINA N/A 05/24/2019    Procedure: Exam under anesthesia, vaginal biopsies, CO2 laser of the vagina;  Surgeon: Lilliam Roy MD;  Location: [facility]    LASER CO2 VAGINA N/A 06/08/2021    Procedure: Exam under anesthesia, laser ablation of the upper vagina;  Surgeon: Dany Perez MD;  Location: [facility]    PICC DOUBLE LUMEN PLACEMENT Left 11/28/2024    left basilic 5 fr dl power picc 44 cm    REPAIR BLADDER N/A 07/12/2024    Procedure: Repair bladder;  Surgeon: Dany Perez MD;  Location: [facility]            Social History:     Social History     Tobacco Use    Smoking status: Never    Smokeless tobacco: Never   Substance Use Topics    Alcohol use: Not Currently            Family History:     Family History   Problem Relation Age of Onset    Heart Disease Father     Heart Failure Father     No Known Problems Brother     No Known Problems Brother     No Known Problems Brother     Pancreatitis Brother     Hypertension Sister     Peripheral Vascular Disease Sister     No Known Problems Sister     No Known Problems Son     No Known Problems Daughter             Allergies:     Allergies   Allergen Reactions    Ibuprofen Nausea and Vomiting    Shrimp     Sulfa Antibiotics Rash     Patient started on bactrim 7/24/24 tolerating medication without rash on 7/25             Medications:     Current Facility-Administered Medications   Medication Dose Route Frequency Provider Last Rate Last Admin    acetaminophen (TYLENOL) tablet 975 mg  975 mg Oral TID Carri Rebollar APRN CNS   975 mg at 02/23/25 0846    [Held by provider] apixaban ANTICOAGULANT (ELIQUIS) tablet 5 mg  5 mg Oral BID Jose Nunez APRN CNP   5 mg at 02/11/25 2029    benzocaine (ORAJEL MAXIMUM STRENGTH) 20 % gel   Mouth/Throat 4x Daily PRN Mukesh Bermudez DO   Given at 02/16/25 2347    bisacodyl (DULCOLAX) EC tablet 5 mg  5 mg Oral Daily PRN Mayra  ERINN Adair CNP        Or    bisacodyl (DULCOLAX) EC tablet 10 mg  10 mg Oral Daily PRN Jose Nunez APRN CNP        bisacodyl (DULCOLAX) suppository 10 mg  10 mg Rectal Daily PRN Jose Nunez APRN CNP        calcium carbonate (TUMS) chewable tablet 1,000 mg  1,000 mg Oral 4x Daily PRN Jose Nunez APRN CNP        ferrous sulfate (CASSANDRA-IN-SOL) oral drops 15 mg  15 mg Oral Daily Mukesh Bermudez,    15 mg at 02/23/25 0846    heparin lock flush 10 unit/mL injection 5-10 mL  5-10 mL Intracatheter Q1H PRN Mukesh Bermudez DO        heparin lock flush 10 unit/mL injection 5-10 mL  5-10 mL Intracatheter Q24H Mukesh Bermudez DO   5 mL at 02/18/25 1411    heparin lock flush 10 unit/mL injection 5-10 mL  5-10 mL Intracatheter Q1H PRN Tashia Brantley MD   5 mL at 02/13/25 0712    heparin lock flush 10 unit/mL injection 5-10 mL  5-10 mL Intracatheter Q24H Tashia Brantley MD   5 mL at 02/20/25 1214    heparin lock flush 100 unit/mL injection 5-10 mL  5-10 mL Intracatheter Q28 Days Mukesh Bermudez DO        heparin lock flush 100 unit/mL injection 5-10 mL  5-10 mL Intracatheter Q28 Days Tashia Brantley MD        hydrocortisone (CORTAID) 1 % cream   Topical BID PRN Maci Chandler MD   Given at 02/20/25 1214    Lidocaine (LIDOCARE) 4 % Patch 2 patch  2 patch Transdermal Q24h Carri Rebollar APRN CNS   2 patch at 02/22/25 1939    lidocaine (LMX4) cream   Topical Q1H PRN Jose Nunez APRN CNP        lidocaine 1 % 0.1-1 mL  0.1-1 mL Other Q1H PRN Jose Nunez APRN CNP        magic mouthwash suspension (diphenhydramine, lidocaine, aluminum-magnesium & simethicone)  10 mL Swish & Spit TID AC Aidan, Mukesh, DO   10 mL at 02/23/25 0846    magic mouthwash suspension (diphenhydramine, lidocaine, aluminum-magnesium & simethicone)  10 mL Swish & Spit Q6H PRN Mukesh Bermudez DO   10 mL at 02/18/25 0612    melatonin tablet 1 mg  1 mg Oral At Bedtime PRN Jose Nunez,  ERINN CNP   1 mg at 02/20/25 2100    morphine 0.5 % in solosite gel 8 g  8 g Topical Daily PRN Carri Rebollar APRN CNS        multivitamin w/minerals (THERA-VIT-M) tablet 1 tablet  1 tablet Oral Daily Tashia Brantley MD   1 tablet at 02/21/25 0848    naloxone (NARCAN) injection 0.2 mg  0.2 mg Intravenous Q2 Min PRN Waibel, Niharika, RPH        Or    naloxone (NARCAN) injection 0.4 mg  0.4 mg Intravenous Q2 Min PRN Waibel, Niharika, RPH        Or    naloxone (NARCAN) injection 0.2 mg  0.2 mg Intramuscular Q2 Min PRN Waibel, Niharika, RPH        Or    naloxone (NARCAN) injection 0.4 mg  0.4 mg Intramuscular Q2 Min PRN Waibel, Niharika, RPH        ondansetron (ZOFRAN ODT) ODT tab 4 mg  4 mg Oral Q6H PRN Jose Nunez APRN CNP   4 mg at 02/22/25 0842    Or    ondansetron (ZOFRAN) injection 4 mg  4 mg Intravenous Q6H PRN Jose Nunez APRN CNP        oxyCODONE IR (ROXICODONE) tablet 20 mg  20 mg Oral Q4H PRN Mukesh Bermudez, DO   20 mg at 02/23/25 1416    Patient is already receiving anticoagulation with heparin, enoxaparin (LOVENOX), warfarin (COUMADIN)  or other anticoagulant medication   Does not apply Continuous PRN Jose Nunez APRN CNP        polyethylene glycol (MIRALAX) Packet 17 g  17 g Oral BID PRN Jose Nunez APRN CNP   17 g at 02/20/25 0904    senna-docusate (SENOKOT-S/PERICOLACE) 8.6-50 MG per tablet 1 tablet  1 tablet Oral BID Tashia Brantley MD   1 tablet at 02/23/25 0846    sodium chloride (PF) 0.9% PF flush 10-20 mL  10-20 mL Intracatheter q1 min prn Yaya Bermudezle, DO        sodium chloride (PF) 0.9% PF flush 10-20 mL  10-20 mL Intracatheter Q1H PRN Mukesh Bermudez DO   10 mL at 02/15/25 0834    sodium chloride (PF) 0.9% PF flush 10-20 mL  10-20 mL Intracatheter Q28 Days Mukesh Bermudez DO        sodium chloride (PF) 0.9% PF flush 10-20 mL  10-20 mL Intracatheter q1 min prn Tashia Brantley MD   20 mL at 02/13/25 0713    sodium chloride (PF) 0.9% PF flush 10-20 mL  10-20  mL Intracatheter Q1H PRN Tashia Brantley MD        sodium chloride (PF) 0.9% PF flush 10-20 mL  10-20 mL Intracatheter Q28 Days Tashia Brantley MD        sodium chloride (PF) 0.9% PF flush 3 mL  3 mL Intracatheter Q8H Mayra, Jose Live, APRN CNP   3 mL at 02/21/25 0504    sodium chloride (PF) 0.9% PF flush 3 mL  3 mL Intracatheter q1 min prn Mayra, Jose Calos, APRN CNP   3 mL at 02/11/25 2227    sodium chloride (PF) 0.9% PF flush 3 mL  3 mL Intracatheter Q8H Mayra, Jose Calos, APRN CNP   3 mL at 02/21/25 2056    sodium chloride (PF) 0.9% PF flush 3 mL  3 mL Intracatheter q1 min prn Mayra, Jose Calos, APRN CNP         Facility-Administered Medications Ordered in Other Encounters   Medication Dose Route Frequency Provider Last Rate Last Admin    heparin lock flush 100 unit/mL injection 500 Units  500 Units Intracatheter Once Dany Perez MD        sodium chloride (PF) 0.9% PF flush 10 mL  10 mL Intracatheter Once Dany Perez MD                 Review of Systems:    ROS: 10 point ROS neg other than the symptoms noted above in the HPI           Physical Exam:   VS:  T: 98.1    HR: 66    BP: 130/85    RR: 18   GEN:  AOx3.  NAD.    CV:  RRR  LUNGS: Non-labored breathing.   BACK:  No midline or CVA tenderness.  ABD:  Soft.  NT.  ND.  No rebound or guarding.  No masses.  - SPT tract clean, no surrounding erythema   SKIN:  Warm.  Dry.  No rashes.  NEURO:  CN grossly intact.            Data:   All laboratory data reviewed:    Recent Labs   Lab 02/22/25  0625 02/18/25  0640 02/17/25  0758   WBC 7.7 9.7 8.3   HGB 10.5* 9.2* 8.8*    245 186       Recent Labs   Lab 02/23/25  0733 02/22/25  0625 02/21/25  0812 02/20/25  0816   * 133* 134* 132*   POTASSIUM 4.3 4.6 4.0 4.6   CHLORIDE 99 100 100 100   CO2 19* 16* 20* 19*   BUN 30.0* 29.0* 29.8* 26.6*   CR 1.19* 1.15* 1.18* 1.05*   GLC 81 77 92 89   SAMUEL 8.8 8.9 8.7* 8.8     No lab results found in last 7 days.    Invalid input(s):  "\"URINEBLOOD\"    All pertinent imaging reviewed:    No recent imaging this admission.            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 She had an SPT placed at that time that she has elected to continue long-term as it has improved her QOL. She was also recently admitted in 11/2024 for sepsis due to likely urinary source and had a left PNT placed. PNT removed by IR in 12/2024 after passing clamp trial. Renal ultrasound after PNT removal on 2/7/25 without hydronephrosis.     She is currently admitted for MADHU, hyponatremia, and worsening BLE edema, and worsening coccyx sores.     Urology consulted today  for SPT exchange requested by primary team to obtain urine sample.     Procedure:   Pt was placed in supine position. She was prepped and draped in sterile fashion. The prior SPT had been removed by RN who first attempted exchange and unable to replace catheter (initially, unable to advance. Then second attempted, advanced easily but unable to inflate balloon).  I placed a 16 Fr  Shahid catheter into the SPT tract. It passed easily without any resistance into the bladder. There was a flash of yellow urine into tubing. I advanced the SPT a significant amount. Initially, the balloon did not inflate and was met with resistance. I suspect she has a very open, pendulous urethra and the catheter easily advances into her urethra. I then pulled the catheter back a few inches, and it easily inflated with 10 cc. Bladder was irrigated with 60 cc sterile water and Grade I urine returned.     16 Fr Shahid catheter was easily placed without resistance through tract. There should be " no issues with her continuing with monthly exchanges with RNs. 1) Ensure that she is laying flat 2) If difficulty inflating balloon when catheter is pushed in adequately, then try pulling catheter back a few inches away from urethra and try again.       Plan:     - Recommend renal ultrasound to r/o hydronephrosis given pt's history of ureteral obstruction that was not treated   - Continue long-term SPT per pt preference for QOL with monthly exchanges     - Will discuss need for outpatient follow-up pending ultrasound results     - Urology will follow peripherally      This patient's exam findings, labs, and imaging discussed with urology staff surgeon Dr. Henry who developed the treatment plan.    Abigail Melo MD   Urology Resident

## 2025-02-23 NOTE — PROGRESS NOTES
"0900: Patient in bedside chair, and per report had been in chair all night. Writer encouraged patient that we should go back to bed due to her not being repositioned for a long time. Patient refused going back to bed and told writer \"just leave me alone matthew\".    1000: Writer rounded on patient and encouraged patient to get back into bed and educated on why it is important to reposition. Patient agreed. Writer and NST got patient back into bed with gait belt and pivoting. When getting the patient comfortable in bed, NST and writer were going to place a chucks under legs due to edema leakage from wounds. Patient stated \"no you aren't putting anything on me\" writer educated patient on the importance of placing the chucks underneath legs to keep her linens clean and patient stated \"matthew I am not playing, don't f**  play with me I can get f** mad\". Writer said \"ok we won't put the chucks under your legs but we will need to change your linens more frequently\". Patient then turned to the side allowing us to place chucks and said \"fine get it done\".   "

## 2025-02-23 NOTE — PROGRESS NOTES
2/22/25 MD Chandler gave writer verbal order to exchange suprapubic catheter per urology, if older than 6 months nursing able to replace. Patient refused multiple times. Urology called writer around 1700 and said not to replaced suprapubic catheter until tomorrow, 2/23/25 in case nursing is unsuccessful with exchange.    2/23/25 Writer clarified verbal order with MD Chandler and Urology resident Lorie to exchange suprapubic catheter at this time.    After writer removed catheter, attempted to place 16fr but was unsuccessful due to catheter not getting urine return and unable to inflate balloon when advanced 3 inches in due to not being able to insert further. MD Chandler and MD Melo notified and was instructed to attempt place another 16fr while waiting for urology to come assess. Writer placed another 16fr and catheter inserted but still not getting urine return and unable to inflate balloon. Catheter removed due to pain to patient. MD Melo ordered a bladder scan to patient but patient is refuses. Writer educated patient on the importance of bladder scanning.

## 2025-02-23 NOTE — PROGRESS NOTES
"[facility]    Medicine Progress Note - Hospitalist Service, GOLD TEAM 22    Date of Admission:  2/5/2025    Assessment & Plan   \"Aranza\" Alis Hartman is a 69 year old woman admitted on 2/5/2025. 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    Changes:   -MADHU, suspect due to dehydration- gave small amt of IV fluid and creat plateaued-- will give albumin since she had marginal response to IV fluid  --recommending suprapubic cathter is changed-- urology says bedside RN can do this if in place for >6 months (which is the case)- they asked nursing to hold off on exchange in evening in the event the catheter was unable to be replaced  --RN attempted replacing it but they weren't able to get it back in-- urology able to replace it  --ideally hoping to get UA/UC after catheter is replaced    Paranoia with suspected hallucinations  Aranza was nervous to share that a white male was in her room last night and at first he seemed nice but then started yelling at her and calling her \"bitch\" and telling her \"you need another drink.\" And she was so shaken up by this. She also reported a female being present (she wondered if it was his girlfriend). Says no medical team members were present when they were there. Says she is very scared about \"what is his next move.\" She said the man left independently (no one came and made him leave).   -reported this to nursing and will make sure unit stays secure  -had hallucinations overnight again 2/21-2/22- see nursing notes    # MADHU on CKD3 - resolved, then worse as of " 2/21  # hyponatremia, likely intra-vascularly hypovolemic - resolved  then worse as of 2/21   Cr worsened on admission compared to prior, slowly worsening since 1/25. Overall studies suggest prerenal but iniitally didn't improve much with small IVF+ encouraging PO intake but this has been limited. History of  hydronephrosis related to her endometrial cancer s/p PNT removal, L just in December 2024. Cystatin C expectedly lower, GFR estimated to be <30. Hyponatremia has been worsening in the last few weeks in the setting of poor PO intake, now likely prerenal MADHU, poor nutrition. Renal US negative for hydronephrosis. Cr peaked at 1.44, now Cr and electrolytes improving on IV albumin, now almost back to prior baseline.  Suspect prerenal in the setting of low oncotic pressure; responsive to albumin now back to baseline.  Creatinine back to baseline, last albumin was 2/12. Stable creatinine without albumin for many days with stable lower extremity edema on 2 L fluid restriction.  - Cr daily, stable (see above re: MADHU)  - 2L fluid restriction but still encouraging her to drink enough; has had good intake  - renal consulted, appreciate recs, have since signed off but should follow up outpatient given CKD   -2/20 and 2/21- creat rising- suspect due to pre-renal etiology due to poor PO intake. Provided small amt of IV fluid on 2/21-- labs plataeued-- giving albumin 2/23    # Chronic bilateral lower extremity edema  # Deconditioning  # Sacral pressure ulcer, POA, worsening   # Severe malnutrition in the context of chronic illness   Hx of chronic edema, no PTA diuretics. Echo 11/24 reassuring EF but poor study, cant comment on diastolic function, edema likely due to some degree of proteinuria and low oncotic pressure with low albumin/malnutrition.  Last hospitalization recommendation was to transitional care unit last hospitalization, however per patient preference and for her mental health discharged to home with home care  and family support in place. Unfortunately symptoms continued to progress at home.  Improved swelling with therapies as below  - Followed now by lymphedema, WOC RN, PT,  RD   - Social work consulted re:placement/discharge planning   - 2L fluid restriction  - Relayed daughter's encourage to PT and OT to call daughter if patient is not participating in therapies  - Care conference 2/18 (see above)    #Acute on Chronic anemia (multifactorial), stable  #Vaginal bleeding  #Gross Hematuria  #Iron Deficiency Anemia  Likely multifactorial 2/2 malignancy, renal disease, nutrition. Likely hemoconcentrated at admission, down to 7.5 and recent baseline appears near this.  Continues to have bleeding on 12/7 with hemoglobin down to 7.2.  UA with large blood but patient has bright red blood in opening of vagina on exam per [facility] nurse.  Therefore, suspect vaginal source.  Vaginal bleeding present on digital exam with minimal tolerability from patient and No overt fungating mass or vaginal abnormality noted, possible nodularity in right vaginal apex vs agglutination per gyn-onc. Continued blood in depends; appears to be in vaginal canal per [facility] assessment - GYN-ONC referral placed.  CT A/P with IV contrast ordered to assess for disease recurrence for prognostication discussions given new onset vaginal bleeding - per gyn-onc, likely etiology is atrophic vagina but she has a high risk for recurrence with this histology.  Patient unable to lie flat for CT imaging.  Hgb 5.9; 1 unit of pRBC ordered (consented daughter as patient somnolent from anxiolytic prior to CT scan) on 2/14.  No clear documentation of continued vaginal bleeding since stopping DOAC as of 2/14.  Normal ferritin on while iron.  Iron studies possibly consistent with some component of iron deficiency with anemia of CKD/chronic disease.  Folate and B12 wnl.    - Hold DOAC for now 2/12 (discussed risk and benefit with Aranza on 2/12) given vaginal bleeding and anemia  -  Monitor for vaginal bleeding; still having intermittent spotting  - Hgb daily; hgb goal >7 (already consented) - stable for multiple days  - Continue Ferrous Sulfate drops daily     Diffuse pruritus  Unclear etiology but would wonder if she has normal skin with abnormal sensation since there is no obvious dermatitis on areas she mentioned are itching  Recommended considering topical hydrocortisone on areas of itching    Recurrent hospitalizations with progressive step-wise decline  Severe malnutrition  Serous endometrial carcinoma  Advanced care planning Discussion  2/18/2025. I, Maci Chandler MD met with Aranza (patient) and her daughter (Joy) today at the hospital to discuss Advance Care Planning. Alis (Francisco Hartman does have decisional capacity and was present for this discussion.  Those present were informed of the voluntary nature of this discussion and wished to proceed.  The discussion included: We discussed that during this current hospitalization, we have been able to temporize many of her medical problems.  We discussed that, as she and her daughter have noticed, she has had a step-wise decline in her mobility with each hospitalization. This has not been helped by times where she has not wanted to participate in physical therapy (regardless of rationale). She is adamant about not going to a TCU due to a bad experience and unfortunately, she is not a TCU candidate at this time. Likewise, Aranza and Joy have not seen improvement in her appetite or ability to take on nutrition, partly worsened by her dislike for the hospital food.  We discussed that the swelling in her lower extremities is in part due to her lack of nutrition.  We discussed that her sacral wounds, while caused by lack of mobility, are unlikely to heal without adequate nutrition.  We discussed that typically, when somebody gets to this point, their health continues to decline.  We discussed that while there are ways to be  aggressive with nutrition, there are typically costs (e.g. if tube feeding is pursued that can come with discomfort of tube in nose, abdominal fullness, diarrhea etc).  Even still, despite everything we do, the body may continue to decline.  Aranza and Joy made it clear throughout the conversation that their preference would be for Aranza to be at home, not only due to comfort but also due to improved participation with cares and therapies.  We discussed that if this option is pursued, it would require a lot of equipment and they are willing to try to make that work.  We also discussed that the alternative, would at this point be a long-term care facility.  They are aware.  Regarding her general outlook, OBGYN said it is difficult to provide more info.  She has been having vaginal bleeding which could either be vaginal atrophy (and bleeding has decreased with stopping blood thinner) or it could represent a recurrence of her endometrial cancer.  Her endometrial cancer is typically an aggressive type and likely will recur at some point according to OBGYN.  Should it recur, they said they would not offer treatment as they are treatment options would worsen her health rather than improve it.  Based on this discussion we broached the topic of hospice but they were not interested in this option at this time.  Updates 2/19- initially said yes to CT and then later refused  2/20- declined CT scan. See OT/PT notes re: rehab details  2/21- Daughter Joy unable to attend lift training this afternoon. Equipment ordered but unlikely that she'll be able to get it over the weekend. Likely soonest she could discharge would be Monday     Face-to-Face for Santo Lift DME Order:  The patient requires the use of a santo lift to transfer from surface to surface, such as from bed to wheelchair, or wheelchair to commode.  Without the use of a santo lift, the patient would be bedbound.      Face-to-Face for Hospital Bed DME  Order:  Hospital bed is required for body positioning, to allow for safe transfers to wheelchair and standing and frequent changes in body position, not feasible in an ordinary bed. She requires positioning of the body (in a way which cannot be accomplished by a normal bed) so that she can have offloading of pressure points due to her sacral pressures ulcers which are expected to last greater than 1 month. She also requires positioning of the body (in a way which cannot be accomplished with a normal bed) to alleviate pain.     I, the undersigned, certify that the above prescribed DME items are medically necessary for this patient and are both reasonable and necessary in reference to accepted standards of medical practice in the treatment of this patient's condition and are not prescribed as a convenience.  Maci Chandler MD  Internal Medicine/Pediatrics Hospitalist        #Chronic Pain  #?Acute on Chronic Pain  Patient reporting thigh, low back pain.  Continues to report moderate to severe pain at times despite current regimen and recent adjustments.  Per nursing, patient often appears comfortable and then cries when they walk into the room.  Not utilizing IV dilaudid much at this point.  No sedation after 15mg doses.  Previously reported abdominal pain much better.  Sacral and thigh pain still uncontrolled per patient report.  Much improved pain on 12/16 based on my assessment, the daughters, and the nurse.  Pain continues to be much improved on oxy 20mg q4h prn.  - Stopped scheduled PO hydromorphone doses given sedation  - Increase oxycodone 15 mg q4h PRN (her home dose) --> 20mg q4h prn after discussion with palliative care on 2/15   - Stop IV Dilaudid as needed on 2/15  - Changed acetaminophen to 975 mg TID     #Hx of ESBL urosepsis and bacteremia  #Bladder dysfunction s/p cystostomy and suprapubic catheter  Recently admitted 11/26 - 12/8/2024 for ESBL urosepsis and bacteremia requiring ICU w/ L nephrostomy  tube (removed 1/7) treated w/ ertapenem, returned 1/25-1/27 for concern for UTI but no clear infection at that time.  ID was consulted that admission and recommended avoiding frequent UA and Ucx checks in future unless patient w/ symptoms of UTI.   - UA/Ucx abnormal at admission, likely colonization, will continue to monitor off antibiotics   - may be overdue for suprapubic cath exchange - will look into this - patient reports this was changed about 1.5 weeks ago; will confirm this with daughter as I can't find indication of this in the chart  - UA sample on 2/16 to assess for continued presence of blood (off suprapubic cath that was not replaced; not reliable for infectious assessment)   --as of 2/21-Will place urology consult for removal and replacement of her suprapubic cathter placement as we would ideally like to collect a non-contaminated urine specimen to evaluate for UTI due to her new onset paranoia (although there are other possible causes as well)-- urology replaced 2/23    #Serous endometrial carcinoma  Follows w/ heme/onc through [facility]. S/p SNEHA, BSO 07/2024 s/p cycle 3 carbo/taxel 10/15/2024, cycle 4 delayed in s/o recent admission, family ultimately decided to forgo any further treatment.   During care conference, it was expressed that, her cancer is likely to recur due to it being an aggressive type but when it will recur is unknown. However, should it recur, GynOnc team expressed that, because of her functional status (significant weakness) and malnutrition, treatment would not be recommended since that would worsen her health overall and benefits would not outweigh the risks.    Mouth sores/ulcers:  -Need to discuss etiology of mouth sores/ulcers with dental     *If given anxiolytic for CT scan, would give less than IV Ativan 1mg (somnolent following this size dose on 2/13)           Diet: Combination Diet Regular Diet Adult  Snacks/Supplements Adult: Other; Send chocolate Ensure Enlive w/  breakfast, strawberry Ensure+HP with dinner; With Meals  Fluid restriction 2000 ML FLUID    DVT Prophylaxis: VTE Prophylaxis contraindicated due to bleeding  Kent Catheter: Not present  Lines: PRESENT      Port a Cath 02/05/25 Single Lumen Right Chest wall-Site Assessment: Unable to visulaize (de-accessed)      Cardiac Monitoring: None  Code Status: Full Code      Clinically Significant Risk Factors         # Hyponatremia: Lowest Na = 133 mmol/L in last 2 days, will monitor as appropriate       # Hypoalbuminemia: Lowest albumin = 1.9 g/dL at 2/5/2025  2:52 PM, will monitor as appropriate     # Hypertension: Noted on problem list             # Severe Malnutrition: based on nutrition assessment    # Financial/Environmental Concerns: unable to afford rent/mortgage (pt interested in resources/info on getting assistance paying rent, as well as applying for county benefits)         Social Drivers of Health            Disposition Plan     Medically Ready for Discharge: Anticipated Tomorrow             Maci Chandler MD  Hospitalist Service, [facility] 22  [facility]  Securely message with [employer] (more info)  Text page via [facility] Paging/Directory   See signed in provider for up to date coverage information  ______________________________________________________________________    Interval History   No fever/chills. No major events overnight. Today is not wanting to talk until after her suprapubic kent catheter is replaced. Her legs are very swollen and she keeps asking to have the lymphedema wraps removed.    Physical Exam   Vital Signs: Temp: 98.1  F (36.7  C) Temp src: Oral BP: 130/85 Pulse: 66   Resp: 18 SpO2: 99 % O2 Device: None (Room air)    Weight: 133 lbs 2.53 oz  Constitutional: Sitting on bed, falling asleep  Head: Normocephalic. Atraumatic.   Resp: breathing comfortably on room air  Gastrointestinal: Abdomen appears non-distended  Musculoskeletal: severely swollen  legs, cachexia in upper body  Skin: exfoliation and desquamation throughout  Psychiatric: mentation flat    Medical Decision Making   45 MINUTES SPENT BY ME on the date of service doing chart review, history, exam, documentation & further activities per the note.      Data     I have personally reviewed the following data over the past 24 hrs:    N/A  \   N/A   / N/A     133 (L) 99 30.0 (H) /  81   4.3 19 (L) 1.19 (H) \

## 2025-02-24 LAB
ANION GAP SERPL CALCULATED.3IONS-SCNC: 15 MMOL/L (ref 7–15)
BUN SERPL-MCNC: 30.7 MG/DL (ref 8–23)
CALCIUM SERPL-MCNC: 8.7 MG/DL (ref 8.8–10.4)
CHLORIDE SERPL-SCNC: 100 MMOL/L (ref 98–107)
CREAT SERPL-MCNC: 1.16 MG/DL (ref 0.51–0.95)
EGFRCR SERPLBLD CKD-EPI 2021: 50 ML/MIN/1.73M2
GLUCOSE SERPL-MCNC: 81 MG/DL (ref 70–99)
HCO3 SERPL-SCNC: 19 MMOL/L (ref 22–29)
HOLD SPECIMEN: NORMAL
POTASSIUM SERPL-SCNC: 3.9 MMOL/L (ref 3.4–5.3)
SODIUM SERPL-SCNC: 134 MMOL/L (ref 135–145)

## 2025-02-24 PROCEDURE — 99207 PR NO BILLABLE SERVICE THIS VISIT: CPT

## 2025-02-24 PROCEDURE — 250N000011 HC RX IP 250 OP 636: Performed by: INTERNAL MEDICINE

## 2025-02-24 PROCEDURE — 250N000013 HC RX MED GY IP 250 OP 250 PS 637: Performed by: STUDENT IN AN ORGANIZED HEALTH CARE EDUCATION/TRAINING PROGRAM

## 2025-02-24 PROCEDURE — 250N000013 HC RX MED GY IP 250 OP 250 PS 637: Performed by: CLINICAL NURSE SPECIALIST

## 2025-02-24 PROCEDURE — 36415 COLL VENOUS BLD VENIPUNCTURE: CPT | Performed by: INTERNAL MEDICINE

## 2025-02-24 PROCEDURE — 250N000013 HC RX MED GY IP 250 OP 250 PS 637: Performed by: INTERNAL MEDICINE

## 2025-02-24 PROCEDURE — 250N000013 HC RX MED GY IP 250 OP 250 PS 637: Performed by: NURSE PRACTITIONER

## 2025-02-24 PROCEDURE — 99232 SBSQ HOSP IP/OBS MODERATE 35: CPT | Performed by: INTERNAL MEDICINE

## 2025-02-24 PROCEDURE — 80048 BASIC METABOLIC PNL TOTAL CA: CPT | Performed by: INTERNAL MEDICINE

## 2025-02-24 PROCEDURE — P9047 ALBUMIN (HUMAN), 25%, 50ML: HCPCS | Performed by: INTERNAL MEDICINE

## 2025-02-24 PROCEDURE — 120N000002 HC R&B MED SURG/OB UMMC

## 2025-02-24 RX ORDER — ALBUMIN (HUMAN) 12.5 G/50ML
50 SOLUTION INTRAVENOUS ONCE
Status: COMPLETED | OUTPATIENT
Start: 2025-02-25 | End: 2025-02-25

## 2025-02-24 RX ORDER — OXYCODONE HYDROCHLORIDE 20 MG/1
20 TABLET ORAL EVERY 4 HOURS PRN
Qty: 30 TABLET | Refills: 0 | Status: ON HOLD | OUTPATIENT
Start: 2025-02-24

## 2025-02-24 RX ORDER — ACETAMINOPHEN 325 MG/1
975 TABLET ORAL EVERY 8 HOURS
Status: ON HOLD
Start: 2025-02-24

## 2025-02-24 RX ORDER — ALBUMIN (HUMAN) 12.5 G/50ML
50 SOLUTION INTRAVENOUS ONCE
Status: COMPLETED | OUTPATIENT
Start: 2025-02-24 | End: 2025-02-24

## 2025-02-24 RX ORDER — DIPHENHYDRAMINE HYDROCHLORIDE AND LIDOCAINE HYDROCHLORIDE AND ALUMINUM HYDROXIDE AND MAGNESIUM HYDRO
10 KIT
Qty: 119 ML | Refills: 1 | Status: ON HOLD | OUTPATIENT
Start: 2025-02-24

## 2025-02-24 RX ADMIN — SENNOSIDES AND DOCUSATE SODIUM 1 TABLET: 50; 8.6 TABLET ORAL at 20:27

## 2025-02-24 RX ADMIN — OXYCODONE HYDROCHLORIDE 20 MG: 10 TABLET ORAL at 16:52

## 2025-02-24 RX ADMIN — LIDOCAINE 4% 2 PATCH: 40 PATCH TOPICAL at 20:33

## 2025-02-24 RX ADMIN — ALBUMIN HUMAN 50 G: 0.25 SOLUTION INTRAVENOUS at 20:31

## 2025-02-24 RX ADMIN — ACETAMINOPHEN 975 MG: 325 TABLET, FILM COATED ORAL at 08:48

## 2025-02-24 RX ADMIN — ACETAMINOPHEN 975 MG: 325 TABLET, FILM COATED ORAL at 20:27

## 2025-02-24 RX ADMIN — OXYCODONE HYDROCHLORIDE 20 MG: 10 TABLET ORAL at 01:11

## 2025-02-24 RX ADMIN — SENNOSIDES AND DOCUSATE SODIUM 1 TABLET: 50; 8.6 TABLET ORAL at 08:48

## 2025-02-24 RX ADMIN — Medication 1 MG: at 01:11

## 2025-02-24 RX ADMIN — Medication 1 TABLET: at 08:48

## 2025-02-24 RX ADMIN — ACETAMINOPHEN 975 MG: 325 TABLET, FILM COATED ORAL at 13:30

## 2025-02-24 RX ADMIN — ALBUMIN HUMAN 50 G: 0.25 SOLUTION INTRAVENOUS at 16:28

## 2025-02-24 RX ADMIN — Medication 15 MG: at 08:49

## 2025-02-24 RX ADMIN — OXYCODONE HYDROCHLORIDE 20 MG: 10 TABLET ORAL at 08:59

## 2025-02-24 RX ADMIN — Medication 5 ML: at 11:15

## 2025-02-24 ASSESSMENT — ACTIVITIES OF DAILY LIVING (ADL)
ADLS_ACUITY_SCORE: 74
ADLS_ACUITY_SCORE: 76
ADLS_ACUITY_SCORE: 74
ADLS_ACUITY_SCORE: 72
ADLS_ACUITY_SCORE: 76
ADLS_ACUITY_SCORE: 74
ADLS_ACUITY_SCORE: 76

## 2025-02-24 NOTE — PLAN OF CARE
Goal Outcome Evaluation:      Plan of Care Reviewed With: patient, child, grandchild(vernell)    Overall Patient Progress: no change    Pt is A&O to self & place, suprapubic catheter in place draining small amounts of rhael urine, continent of bowel - LBM 2/24. Assist x2 w/ sarasteady & GB if OOB. PRN Oxycodone given x2 for pain. R Port - SL. Patient continues to wail loudly and frequently through shift - is not easily redirectable. Plan to discharge home to family when DME arrives.    Call light within reach. Continue POC.    Temp: 98.8  F (37.1  C) Temp src: Oral BP: 128/87 Pulse: 96   Resp: 18 SpO2: 100 % O2 Device: None (Room air)

## 2025-02-24 NOTE — PLAN OF CARE
"Goal Outcome Evaluation:      VS: /69 (BP Location: Left arm)   Pulse 70   Temp 97.7  F (36.5  C) (Oral)   Resp 18   Ht 1.6 m (5' 3\")   Wt 60.4 kg (133 lb 2.5 oz)   SpO2 96%   BMI 23.59 kg/m       O2: Stable ORA   Output: Suprapubic cath in place and draining adequately   Last BM: 2/24/25   Activity: Assist x2   Up for meals? Yes, poor appetite    Skin: Dry, abraision on BLE, bilat elbow, wound on sacrum, BL edema   Pain: Reports pain at 9/10 managed with oxy   CMS: AO x3   Dressing: CDI to BLE, sacrum and bilat elbow   Diet: Regular   LDA: Rt chest port hep locked   Equipment: Sonia steady, gait belt   Plan: Continue with POC   Additional Info: Declined US   Wound care done this shift   Albumin infusion administered this shift       "

## 2025-02-24 NOTE — PROGRESS NOTES
Care Management Follow Up    Length of Stay (days): 19    Expected Discharge Date: 02/25/2025     Concerns to be Addressed: discharge planning     Patient plan of care discussed at interdisciplinary rounds: Yes    Anticipated Discharge Disposition: Home (w/ 24/7 family support)     Anticipated Discharge Services: County Worker, Home Care  Anticipated Discharge DME:  (RNCC ordered santo lift [including large size sling w/ commode opening] and hospital bed w/ gel overlay; pt has wheelchair and commode at home)    Patient/family educated on Medicare website which has current facility and service quality ratings: no (N/A at this time)  Education Provided on the Discharge Plan: Yes  Patient/Family in Agreement with the Plan: yes    Referrals Placed by CM/SW: Homecare  Private pay costs discussed: Not applicable    Discussed  Partnership in Safe Discharge Planning  document with patient/family: No     Handoff Completed: Not currently (EHO needed at discharge)    Additional Information:    Home Care Agency:  [facility] ([facility])  Ph: [phone]  Fx: [phone]  Services to be provided: RN/PT/OT/HHA  ______________________________________    Note filed by Irais STALEY on 2/22 indicates that DME delivery is scheduled for 2/25 (per report from Joy) and that pt's family may opt to provide discharge transport.    10:00am - Case discussed in rounds this AM.  Per provider, santo training was completed over the weekend.  Training on wound cares has yet to be completed.  Provider aware of potential DME delivery tomorrow.    12:33pm - Reached out to [employer] via [employer] portal, inquired if the DME items (santo and hospital bed) are scheduled to be delivered tomorrow.  Representative confirmed yes, but they do not have an ETA, nor do they have a time that they can guarantee the items will be delivered by, as they cannot speak for the routes the drivers must follow.  That being said, the   "will contact Joy tomorrow w/ an ETA.    Should further discuss logistics of discharge transport w/ family, as stretcher was recommended by PT d/t stairs to enter home (see note filed by this writer on 2/19).  If ultimately arranging stretcher transport, would need to arrange for a time of day that ensures pt's caregiver(s) would be home and that the DME has been delivered.      Next Steps:  - Follow up w/ pt on her progress in completing HCD    - Monitor [place] portal for status of delivery of DME from [facility]    - Encourage Joy to come in for training/education on wound cares    - Remind nursing staff to provide wound care supplies/education at discharge    - Discuss transport home w/ pt/family (stretcher recommended, per previous discussion w/ PT)    - Ensure discharge orders include specific orders to follow for completion of wound care    - Send orders to [facility] at discharge    - Address additional discharge needs that may arise          LUÍS Medrano  [facility]  Units 8M/S & 10 ICU  Reachable on Vocera - Search by name or search \"RNCC\"  Phone: [phone]  "

## 2025-02-24 NOTE — PROVIDER NOTIFICATION
2200: Millie Valero notified that patient continues to refuse renal ultrasound - is agitated and not redirectable about topic at this time. Will continue to monitor catheter for urine output and re-attempt imaging in AM.
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
## 2025-02-24 NOTE — PROGRESS NOTES
Palliative Care Sign-Off Note    Palliative Care was consulted for symptom management (wound pain). At this time the patient does not have any needs we are actively addressing, and we are signing off. However we know their condition may change -- please re-consult us if we can be helpful at any time in the future.     Thank you for the opportunity to be involved in the care of this patient.    This is a non-billable note.     ERINN Jones CNP  Securely message with [employer] (more info)  Text page via [facility] Paging/Directory

## 2025-02-24 NOTE — PROGRESS NOTES
BRIEF UROLOGY NOTE    Appreciate excellent medicine cares, understand discharge planning pending, would appreciate RBUS prior to discharge to ensure no obstructive component of MADHU. Defer final plan on fluid resuscitation given poor urine output (300mL in last 24h) to medicine. Agree that UA from existing SPT likely of low clinical utility

## 2025-02-25 LAB
ANION GAP SERPL CALCULATED.3IONS-SCNC: 17 MMOL/L (ref 7–15)
BUN SERPL-MCNC: 27.2 MG/DL (ref 8–23)
CALCIUM SERPL-MCNC: 9.7 MG/DL (ref 8.8–10.4)
CHLORIDE SERPL-SCNC: 103 MMOL/L (ref 98–107)
CREAT SERPL-MCNC: 0.96 MG/DL (ref 0.51–0.95)
EGFRCR SERPLBLD CKD-EPI 2021: 63 ML/MIN/1.73M2
GLUCOSE SERPL-MCNC: 91 MG/DL (ref 70–99)
HCO3 SERPL-SCNC: 19 MMOL/L (ref 22–29)
HOLD SPECIMEN: NORMAL
MAGNESIUM SERPL-MCNC: 2.2 MG/DL (ref 1.7–2.3)
PHOSPHATE SERPL-MCNC: 2 MG/DL (ref 2.5–4.5)
POTASSIUM SERPL-SCNC: 3.5 MMOL/L (ref 3.4–5.3)
SODIUM SERPL-SCNC: 139 MMOL/L (ref 135–145)

## 2025-02-25 PROCEDURE — 36591 DRAW BLOOD OFF VENOUS DEVICE: CPT | Performed by: INTERNAL MEDICINE

## 2025-02-25 PROCEDURE — 250N000011 HC RX IP 250 OP 636: Performed by: INTERNAL MEDICINE

## 2025-02-25 PROCEDURE — 99232 SBSQ HOSP IP/OBS MODERATE 35: CPT | Performed by: INTERNAL MEDICINE

## 2025-02-25 PROCEDURE — 82435 ASSAY OF BLOOD CHLORIDE: CPT | Performed by: INTERNAL MEDICINE

## 2025-02-25 PROCEDURE — 250N000013 HC RX MED GY IP 250 OP 250 PS 637: Performed by: INTERNAL MEDICINE

## 2025-02-25 PROCEDURE — 84100 ASSAY OF PHOSPHORUS: CPT | Performed by: INTERNAL MEDICINE

## 2025-02-25 PROCEDURE — P9047 ALBUMIN (HUMAN), 25%, 50ML: HCPCS | Performed by: INTERNAL MEDICINE

## 2025-02-25 PROCEDURE — 250N000011 HC RX IP 250 OP 636: Performed by: STUDENT IN AN ORGANIZED HEALTH CARE EDUCATION/TRAINING PROGRAM

## 2025-02-25 PROCEDURE — 250N000013 HC RX MED GY IP 250 OP 250 PS 637: Performed by: STUDENT IN AN ORGANIZED HEALTH CARE EDUCATION/TRAINING PROGRAM

## 2025-02-25 PROCEDURE — 80048 BASIC METABOLIC PNL TOTAL CA: CPT | Performed by: INTERNAL MEDICINE

## 2025-02-25 PROCEDURE — G0463 HOSPITAL OUTPT CLINIC VISIT: HCPCS

## 2025-02-25 PROCEDURE — 83735 ASSAY OF MAGNESIUM: CPT | Performed by: INTERNAL MEDICINE

## 2025-02-25 PROCEDURE — 120N000002 HC R&B MED SURG/OB UMMC

## 2025-02-25 PROCEDURE — 250N000013 HC RX MED GY IP 250 OP 250 PS 637: Performed by: NURSE PRACTITIONER

## 2025-02-25 PROCEDURE — 250N000013 HC RX MED GY IP 250 OP 250 PS 637: Performed by: CLINICAL NURSE SPECIALIST

## 2025-02-25 RX ADMIN — ALBUMIN HUMAN 50 G: 0.25 SOLUTION INTRAVENOUS at 08:13

## 2025-02-25 RX ADMIN — OXYCODONE HYDROCHLORIDE 20 MG: 10 TABLET ORAL at 08:12

## 2025-02-25 RX ADMIN — Medication 5 ML: at 10:48

## 2025-02-25 RX ADMIN — Medication 1 MG: at 21:22

## 2025-02-25 RX ADMIN — Medication 5 ML: at 11:00

## 2025-02-25 RX ADMIN — ACETAMINOPHEN 975 MG: 325 TABLET, FILM COATED ORAL at 13:42

## 2025-02-25 RX ADMIN — LIDOCAINE 4% 2 PATCH: 40 PATCH TOPICAL at 20:22

## 2025-02-25 RX ADMIN — Medication 1 TABLET: at 08:12

## 2025-02-25 RX ADMIN — OXYCODONE HYDROCHLORIDE 20 MG: 10 TABLET ORAL at 20:31

## 2025-02-25 RX ADMIN — SENNOSIDES AND DOCUSATE SODIUM 1 TABLET: 50; 8.6 TABLET ORAL at 20:22

## 2025-02-25 RX ADMIN — OXYCODONE HYDROCHLORIDE 20 MG: 10 TABLET ORAL at 15:48

## 2025-02-25 RX ADMIN — SENNOSIDES AND DOCUSATE SODIUM 1 TABLET: 50; 8.6 TABLET ORAL at 08:12

## 2025-02-25 RX ADMIN — Medication 15 MG: at 08:20

## 2025-02-25 RX ADMIN — OXYCODONE HYDROCHLORIDE 20 MG: 10 TABLET ORAL at 02:47

## 2025-02-25 RX ADMIN — ACETAMINOPHEN 975 MG: 325 TABLET, FILM COATED ORAL at 20:22

## 2025-02-25 RX ADMIN — Medication 1 MG: at 02:47

## 2025-02-25 RX ADMIN — ACETAMINOPHEN 975 MG: 325 TABLET, FILM COATED ORAL at 08:12

## 2025-02-25 ASSESSMENT — ACTIVITIES OF DAILY LIVING (ADL)
ADLS_ACUITY_SCORE: 72

## 2025-02-25 NOTE — PLAN OF CARE
"Goal Outcome Evaluation:      VS: /82 (BP Location: Left arm)   Pulse 82   Temp 98.7  F (37.1  C) (Oral)   Resp 18   Ht 1.6 m (5' 3\")   Wt 60.4 kg (133 lb 2.5 oz)   SpO2 96%   BMI 23.59 kg/m       O2: Stable ORA   Output: Sup cath in place and draining adequately   Last BM: 2/24/25   Activity: Assist x2   Up for meals? Yes, poor appetite   Skin: Sacral wound, BLE wound and edema, BL elbow wound   Pain: Reports pain at 10/10   CMS: AO x3   Dressing: CDI   Diet: Regular   LDA: Right chest port   Equipment: Sonia steady, gait belt   Plan: Possible discharge 2/26/25   Additional Info:       "

## 2025-02-25 NOTE — PLAN OF CARE
Goal Outcome Evaluation:    Plan of Care Reviewed With: patient  Overall Patient Progress: no changeOverall Patient Progress: no change  Shift [id number]  No acute events overnight. Pt is AxOx3, except time. Denies sob, cp, or cough. Pain rated 10/10, oxycodone given. Assistx2 with angelica steady. Suprapubic cath draining small amount of rahel output. Albumin infusion administered. R port SL.  Frequently cries through the shift. POC ongoing. Potential discharge today if supplies are delivered to home and daughter has necessary teaching.   Temp: 97.7  F (36.5  C) Temp src: Oral BP: 110/65 Pulse: 92   Resp: 16 SpO2: 96 % O2 Device: None (Room air)

## 2025-02-25 NOTE — PROGRESS NOTES
Care Management Follow Up    Length of Stay (days): 20    Expected Discharge Date: 02/26/2025     Concerns to be Addressed: discharge planning     Patient plan of care discussed at interdisciplinary rounds: Yes    Anticipated Discharge Disposition: Home (w/ 24/7 family support)     Anticipated Discharge Services: County Worker, Home Care  Anticipated Discharge DME:  (RNCC ordered santo lift [including large size sling w/ commode opening] and hospital bed w/ gel overlay; pt has wheelchair and commode at home)    Patient/family educated on Medicare website which has current facility and service quality ratings: no (N/A at this time)  Education Provided on the Discharge Plan: Yes  Patient/Family in Agreement with the Plan: yes    Referrals Placed by CM/SW: Homecare  Private pay costs discussed: Not applicable    Discussed  Partnership in Safe Discharge Planning  document with patient/family: No     Handoff Completed: Not currently (EHO needed at discharge)    Additional Information:    Home Care Agency:  [facility] ([facility])  Ph: [phone]  Fx: [phone]  Services to be provided: RN/PT/OT/HHA  ______________________________________    Spoke w/ bedside RN this AM about potential discharge for today.  RN aware that pt's dtr, Joy, needs wound care teaching and is to be provided w/ wound care supplies at discharge.  Regarding transport, RN does not believe it to be safe/reasonable for pt's family to pick her up, given pt's mobility status.    9:25am - Called Joy, discussed discharge for today.  She stated she intends to come to the hospital sometime in the afternoon for wound care teaching (did not commit to a specific time).  She did not have an ETA on DME delivery yet from [employer].  Advised her to alert this writer when she knows of delivery time.  Discussed transport home, explained that while this writer understands she was contemplating having family pick pt up, it would likely be  unsafe.  Explained that a stretcher ride would be the safest option for pt to get home, as they can navigate stairs to enter the home and assist w/ transfer to a location of pt's choosing (i.e., hospital bed).  Explained that if using [facility] [facility], the ride will be billed to insurance, but no guarantee of coverage can be made.  Explained that between pt's 2 insurance plans, Medicare and Medicaid, the [place] plan would be more likely to cover transport, if at all.  She expressed understanding, was in agreement w/ stretcher transport.  Explained that this writer will attempt to have this discussion w/ pt as well.  Regarding transport time, Joy and this writer decided on scheduling transport for around 6:00pm.  In the event DME is delivered before this time, pt can proceed w/ discharge.  If DME won't be delivered until later beyond 6:00pm, ride can be rescheduled (either for later than 6pm today vs tomorrow, depending upon what is feasible).    Met w/ pt at bedside, attempted to discuss discharge plan.  Pt averted her gaze from this writer and declined to engage in conversation.  Attempted to shift topic of discussion to asking pt if there is anything that this writer can get for her, etc., she still declined to engage in any conversation (no verbal responses, no nodding/shaking of head).  Opted to leave room at this time.    Called [facility] ([phone]), spoke w/ Rin, she scheduled a stretcher ride to bring pt home this evening w/ pickup window of 6:09pm- 6:54pm.    10:00am - Case discussed in rounds.  Ensured provider and charge RN are aware of the discharge plan and tentative transport time.  Care team in agreement w/ current plan.    Called [facility] liaison, inquired if [facility] nursing will be able to do suprapubic catheter exchanges.  Stated it was last changed on 2/23, per nursing notes.  Liaison stated she will follow up on this inquiry and update this writer later.    Received  update from [facility] liaison that they can do the suprapubic cath exchanges, requested orders to specify frequency of changes.    Entered HC referral order, notified provider of need for signature.  Also requested provider to specify frequency of catheter exchanges in discharge orders.    Shortly before 1:00pm, [facility] updated [place] order to show DME as successfully delivered.    Updated bedside RN to DME delivery and discussed stretcher ride for this evening.  RN inquired if pt can still discharge if pt's dtr does not show up for wound care training.  RN explained that wound cares are not daily, so she could do all cares today and this would offer a 2 day buffer for home care to have time to see pt w/in 48 hrs.  RNCC to follow up w/ [facility] liaison.    1:18pm - Reached out to liaison, inquired if pt could still discharge today w/o wound care teaching being provided to family, given the above info.  Liaison explained that while it would not be ideal, [facility] would attempt to do their best to see pt w/in a timely fashion.  Ultimately, decision to discharge in this circumstance will be up to the provider.    1:22pm - Called Joy, she confirmed DME was delivered but reported that there are items missing required to complete the hospital bed.  She stated the  reported he would go retrieve the missing items after making some more deliveries.  She was not provided a timeline on when things would be delivered.  Notified her of current discharge transport time, explained that it can be rescheduled if DME is not able to be delivered in a timely fashion, but that someone would need to be home to care for pt upon her arrival.  Inquired about discharge timing preference for tomorrow, in the event discharge must be delayed d/t DME issue.  She was not able to provide a preference at this time, requested to touch base later on this.  She stated she has an appt tomorrow morning and is currently at an appt w/ her son  "right now.  She also was unable to provide a time on when she will come in to the hospital today for wound care training.    Updated bedside RN to above discussion w/ Joy RN acknowledged.    3:32pm - Called Joy, she stated she still has not heard from the [employer]  on when the missing hospital bed items will be delivered.  Given lack of info on delivery, informed her that this writer will reschedule transport to tomorrow to ensure DME is in-home before pt discharges.  Discussed discharge timing to ensure someone is available at home to care for pt, she stated evening would work fine, such as between 5pm-6pm.    Called [facility], spoke w/ Cecelia, she rescheduled stretcher ride to tomorrow w/ pickup window of [phone].    Updated bedside RN.      Next Steps:    Stretcher ride is scheduled for discharge to home on 2/26 w/ pickup window of [phone].    - Follow up w/ pt on her progress in completing HCD    - Follow up w/ Joy on DME delivery    - Encourage Joy to come in for training/education on wound cares before discharge    - Remind nursing staff to provide wound care supplies/education at discharge    - Ensure discharge orders include specific orders to follow for completion of wound care    - Send orders to [facility] at discharge    - Complete electronic PCS form          LUÍS Medrano  [facility]  Units 8M/S & 10 ICU  [employer] on [email or web address] - Search by name or search \"RN[facility]\"  Phone: [phone]  "

## 2025-02-25 NOTE — DISCHARGE SUMMARY
"[facility]  Hospitalist Discharge Summary      Date of Admission:  2/5/2025  Date of Discharge:  2/25/2025  Discharging Provider: Adrian Jaramillo MD  Discharge Service: Hospitalist Service, GOLD TEAM 22    Discharge Diagnoses   # MADHU on CKD3 - resolved, then worse as of 2/21  # hyponatremia, likely intra-vascularly hypovolemic - resolved    # Chronic bilateral lower extremity edema  # Deconditioning  # Sacral pressure ulcer, POA, worsening   # Severe malnutrition in the context of chronic illness   #Acute on Chronic anemia (multifactorial), stable  #Vaginal bleeding  #Gross Hematuria  #Iron Deficiency Anemia  # Recurrent hospitalizations with progressive step-wise decline  # Severe malnutrition  #Serous endometrial carcinoma  #Chronic Pain  #Acute on Chronic Pain  # Paranoia with suspected hallucinations    Clinically Significant Risk Factors     # Severe Malnutrition: based on nutrition assessment      Follow-ups Needed After Discharge   Follow-up Appointments       Hospital Follow-up with Existing Primary Care Provider (PCP)      Please see details below     In addition, does need renal ultrasound to eval hydronephrosis.  However, every time it was attempted in hospital, patient declined, even with family support      Schedule Primary Care visit within: 7 Days   Recommended labs and Imaging (to be ordered by Primary Care Provider): BMP                Unresulted Labs Ordered in the Past 30 Days of this Admission       No orders found from 1/6/2025 to 2/6/2025.             Discharge Disposition   Discharged to home  Condition at discharge: Fair    Hospital Course   \"Aranza\" Alis Hartman is a 69 year old woman admitted on 2/5/2025. 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    Overall, has multiple serious medical conditions, and also declines multiple interventions.  However, has not been ready to reassess goals of care.  Leading up to discharge, her daughter wanted to bring her home, with the thought that Aranza would do better in a familiar environment.     Course by problem:  # MADHU on CKD3 - resolved, then worse as of 2/21  # hyponatremia, likely intra-vascularly hypovolemic - resolved    2L fluid restriction but still encouraging her to drink enough; has had good intake  - renal consulted, appreciate recs, have since signed off but should follow up outpatient given CKD     # Chronic bilateral lower extremity edema  # Deconditioning  # Sacral pressure ulcer, POA, worsening   # Severe malnutrition in the context of chronic illness   Hx of chronic edema, no PTA diuretics. Echo 11/24 reassuring EF but poor study, cant comment on diastolic function, edema likely due to some degree of proteinuria and low oncotic pressure with low albumin/malnutrition.  Last hospitalization recommendation was to transitional care unit last hospitalization, however per patient preference and for her mental health discharged to home with home care and family support in place. Unfortunately symptoms continued to progress at home.  Improved swelling with therapies as below  - Followed now by lymphedema, WOC RN, PT,  RD   - Social work consulted re:placement/discharge planning   - 2L fluid restriction  See notes from care conference 2/18    #Acute on Chronic anemia (multifactorial), stable  #Vaginal bleeding  #Gross Hematuria  #Iron Deficiency Anemia  Likely multifactorial 2/2 malignancy, renal disease, nutrition.  Vaginal bleeding present on digital exam with minimal tolerability from patient and No overt fungating mass or vaginal  abnormality noted, possible nodularity in right vaginal apex vs agglutination per gyn-onc. Continued blood in depends; appears to be in vaginal canal per WOC assessment - GYN-ONC referral placed.  CT A/P with IV contrast ordered to assess for disease recurrence for prognostication discussions given new onset vaginal bleeding - per gyn-onc, likely etiology is atrophic vagina but she has a high risk for recurrence with this histology.  Patient unable to lie flat for CT imaging.  Hgb 5.9; 1 unit of pRBC ordered (consented daughter as patient somnolent from anxiolytic prior to CT scan) on 2/14.  No clear documentation of continued vaginal bleeding since stopping DOAC as of 2/14.     # Recurrent hospitalizations with progressive step-wise decline  # Severe malnutrition  See 2/18 care conference notes  -Plan on discharge was to attempt to continue cares at home, and follow up with outpatient team        #Serous endometrial carcinoma  Follows w/ heme/onc through [facility]. S/p SNEHA, BSO 07/2024 s/p cycle 3 carbo/taxel 10/15/2024, cycle 4 delayed in s/o recent admission, family ultimately decided to forgo any further treatment.   During care conference, it was expressed that, her cancer is likely to recur due to it being an aggressive type but when it will recur is unknown. However, should it recur, GynOnc team expressed that, because of her functional status (significant weakness) and malnutrition, treatment would not be recommended since that would worsen her health overall and benefits would not outweigh the risks.    #Chronic Pain  #?Acute on Chronic Pain  Seen by palliative care.  Has outpatient pain provider, and on chronic opioids.  - discussed with daughter on discharge who reported accessibility to connect with outpatient team for follow up    # Paranoia with suspected hallucinations: noted earlier in hospital stay       Consultations This Hospital Stay   PHYSICAL THERAPY ADULT IP CONSULT  LYMPHEDEMA THERAPY IP  CONSULT  CARE MANAGEMENT / SOCIAL WORK IP CONSULT  WOUND OSTOMY CONTINENCE NURSE  IP CONSULT  CARE MANAGEMENT / SOCIAL WORK IP CONSULT  NEPHROLOGY GENERAL ADULT IP CONSULT  OCCUPATIONAL THERAPY ADULT IP CONSULT  UROLOGY IP CONSULT  DENTAL HYGIENE IP ADULT CONSULT - UU  WOUND OSTOMY CONTINENCE NURSE  IP CONSULT  SPIRITUAL HEALTH SERVICES IP CONSULT  WOUND OSTOMY CONTINENCE NURSE  IP CONSULT  GYNECOLOGIC ONCOLOGY ADULT IP CONSULT  PALLIATIVE CARE ADULT IP CONSULT  PALLIATIVE CARE ADULT IP CONSULT  CARE MANAGEMENT / SOCIAL WORK IP CONSULT  CARE MANAGEMENT / SOCIAL WORK IP CONSULT  PHARMACY LIAISON FOR MEDICATION COVERAGE CONSULT  SPIRITUAL HEALTH SERVICES IP CONSULT  UROLOGY IP CONSULT  UROLOGY IP CONSULT    Code Status   Full Code    Time Spent on this Encounter   I, Adrian Jaramillo MD, personally saw the patient today and spent greater than 30 minutes discharging this patient.       Adrian Jaramillo MD  [facility] [address]  [address] [address]  Phone: [phone]  Fax: [phone]  ______________________________________________________________________    Physical Exam   Vital Signs: Temp: 98.2  F (36.8  C) Temp src: Oral BP: 122/76 Pulse: 86   Resp: 16 SpO2: 100 % O2 Device: None (Room air)    Weight: 133 lbs 2.53 oz  Gen: NAD  HEENT: EOMI, PERRL, MMM  CV: extremities warm and well perfused  Resp: breathing comfortably on RA  : deferred  Msk: no LE edema  Skin: no rashes  Neuro: nonfocal exam         Primary Care Physician   Maria Fernanda Eckert    Discharge Orders      US Renal Complete Non-Vascular     Home Care Referral      Reason for your hospital stay    You were admitted due to acute kidney injury, hyponatremia, and worsening selling in your legs     Activity    Your activity upon discharge: activity as tolerated     Tubes and Drains    Current Tubes and Drains:     CVC Line  Duration           Port a Cath 02/05/25 Single Lumen Right Chest wall 19 days        Drain  Duration           Suprapubic Catheter  Non-latex 16 fr Tube exchanged per orders 16 days              Wound care and dressings    Instructions to care for your wound at home:   Sacrum  Every 3 days and as needed  Peel back dressing Q shift for evaluation, reapply or change as needed   Cleanse the area with NS and pat dry.  Apply No sting film barrier to periwound skin.  Dust open areas with ostomy powder and pat into place.   Cover wound with Sacral Mepilex (#[id number])  Turn and reposition Q 2hrs side to side only.  Ensure pt has Tanner-cushion while sitting up in the chair.  Ensure isoflex pump on bed and set  FYI- If pt has constant incontinent loose stools needing dressing changes Q shift please discontinue the Mepilex dressing and apply criticaid barrier paste BID and PRN.       Bilateral lower legs and arm wounds: Every other day: remove dressings and wash wounds with Vashe and gauze. Pat dry. Cover open areas with Vaseline gauze and secure with large Mepilex or ABD and stretch netting. If using Mepilex, note patient has very delicate skin so care should be taken to prevent worsening skin tears when removing.     Diet    Follow this diet upon discharge: Current Diet:Orders Placed This Encounter      Snacks/Supplements Adult: Other; Send chocolate Ensure Enlive w/ breakfast, strawberry Ensure+HP with dinner; With Meals      Fluid restriction 2000 ML FLUID      Combination Diet Regular Diet Adult     Hospital Follow-up with Existing Primary Care Provider (PCP)    Please see details below            Significant Results and Procedures   Results for orders placed or performed during the hospital encounter of 02/05/25   XR Chest 1 View    Narrative    Exam: XR CHEST 1 VIEW, 2/5/2025 1:43 PM    Indication: edema    Comparison: 1/25/2025    Findings:   Frontal, upright view of the chest. Right IJ tunnel catheter  terminates in the SVC. Patient is rotated. Cardiomediastinal  silhouette is within normal limits. No confluent airspace opacities.  No discernible  "pneumothorax, however the left apex is not well  evaluated given the degree of obliquity. The costophrenic angles are  clear. Atherosclerotic calcifications of the aortic arch. No acute  osseous abnormality.      Impression    Impression: No acute cardiopulmonary abnormality.    I have personally reviewed the examination and initial interpretation  and I agree with the findings.    SHAYNA ADKINS MD         SYSTEM ID:  [id number]   US Lower Extremity Venous Duplex Bilateral    Narrative    ULTRASOUND LOWER EXTREMITY VENOUS DUPLEX BILATERAL 2/5/2025 2:14 PM    CLINICAL HISTORY: Increased bilateral lower edema      COMPARISONS: None available.    REFERRING PROVIDER: RONALD BHARDWAJ CHRISTIAN    TECHNIQUE: Grayscale, color Doppler, Doppler waveform ultrasound  evaluation was performed through the bilateral common femoral,  femoral, and popliteal veins. Bilateral posterior tibial and peroneal  veins were evaluated with grayscale imaging and compression.    FINDINGS: Right common femoral, femoral, and popliteal veins are fully  compressible with phasic Doppler waveforms.    Right posterior tibial veins are fully compressible.    Right peroneal veins are fully compressible.    Left common femoral, femoral, and popliteal veins are fully  compressible with phasic Doppler waveforms.    Left posterior tibial veins are fully compressible.    Left peroneal veins are fully compressible.      Impression    IMPRESSION: No deep venous thrombosis demonstrated in either leg.    Reference: \"Duplex Ultrasound in the Diagnosis of Lower-Extremity Deep  Venous Thrombosis\"- Mya Faust MD, S; Abelardo Dejesus MD  (Circulation. 2014;129:917-921. http://circ.ahajournals.org)    GAETANO CORTEZ MD         SYSTEM ID:  [id number]   US Renal Complete Non-Vascular    Narrative    EXAMINATION: US RENAL COMPLETE NON-VASCULAR, 2/7/2025 11:31 AM     COMPARISON: Nephrostomy tube check 12/30/2024, CT AP 12/12/2024    HISTORY: history of " hydronephrosis req PNT, worsening kidney function    TECHNIQUE: The kidneys and bladder were scanned in the standard  fashion with specialized ultrasound transducer(s) using both gray  scale and limited color/spectral Doppler techniques.    FINDINGS:  Right kidney: Measures 7.9 cm in length. Parenchyma is of proportional  thickness and echogenicity. No focal mass. No hydronephrosis.    Left kidney: Measures 8.0 cm in length. Parenchyma is of proportional  thickness and echogenicity. No focal mass. No hydronephrosis.     Bladder: Not visualized.      Impression    IMPRESSION:  Small bilateral kidneys without evidence for hydronephrosis.    I have personally reviewed the examination and initial interpretation  and I agree with the findings.    LUCHO MARQUEZ MD         SYSTEM ID:  [id number]       Discharge Medications   Current Discharge Medication List        START taking these medications    Details   magic mouthwash suspension, diphenhydrAMINE, lidocaine, aluminum-magnesium & simethicone, (FIRST-MOUTHWASH BLM) compounding kit Swish and spit 10 mLs in mouth 3 times daily (before meals).  Qty: 119 mL, Refills: 1    Associated Diagnoses: Wound pain      !! melatonin 1 MG TABS tablet Take 1 tablet (1 mg) by mouth nightly as needed for sleep.  Qty: 30 tablet, Refills: 1    Associated Diagnoses: Insomnia, unspecified type      morphine 0.5 % in solosite gel Apply 8-16 clicks (4-8 g) topically daily as needed for pain (with dressing changes).  Qty: 40 g, Refills: 0    Comments: Dispense in dosing applicator. 1 click = 0.5g of product.  Associated Diagnoses: Wound pain       !! - Potential duplicate medications found. Please discuss with provider.        CONTINUE these medications which have CHANGED    Details   acetaminophen (TYLENOL) 325 MG tablet Take 3 tablets (975 mg) by mouth every 8 hours.    Associated Diagnoses: Malignant neoplasm of overlapping sites of cervix (H)      oxyCODONE IR (ROXICODONE) 20 MG TABS  immediate release tablet Take 20 mg by mouth every 4 hours as needed for severe pain (Chronic Pain).  Qty: 30 tablet, Refills: 0    Associated Diagnoses: Wound pain           CONTINUE these medications which have NOT CHANGED    Details   albuterol (PROAIR HFA/PROVENTIL HFA/VENTOLIN HFA) 108 (90 Base) MCG/ACT inhaler Inhale 1-2 puffs into the lungs every 4 hours as needed for shortness of breath    Comments: Pharmacy may dispense brand covered by insurance (Proair, or proventil or ventolin or generic albuterol inhaler)      calcium Citrate-vitamin D 500-400 MG-UNIT CHEW Take 1 tablet by mouth daily      cyanocobalamin (CYANOCOBALAMIN) 1000 MCG/ML injection Inject 1 mL (1,000 mcg) into a muscle every month.      diclofenac (VOLTAREN) 1 % topical gel Apply 4 g topically 4 times daily as needed for moderate pain.  Qty: 50 g, Refills: 0    Associated Diagnoses: Other hydronephrosis      ferrous sulfate (CASSANDRA-IN-SOL) 75 (15 FE) MG/ML oral drops Take 15 mg by mouth daily      hydrocortisone 2.5 % cream Apply topically as needed      lidocaine (XYLOCAINE) 5 % external ointment Apply 1 Application topically as needed      !! melatonin 3 MG tablet Take 1 tablet (3 mg) by mouth or Feeding Tube at bedtime.  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 (INTERDRY 10\"X36\") SHEE Externally apply 10 each topically every 24 hours. Apply to skin folds on abdomen  Qty: 10 each, Refills: 1    Associated Diagnoses: Serous adenocarcinoma of uterus (H); Suprapubic catheter (H)      triamcinolone (KENALOG) 0.1 % external ointment Apply topically 3 times daily as needed for irritation.  Qty: 30 g, Refills: 1    Associated Diagnoses: Skin irritation       !! - Potential duplicate medications found. Please discuss with provider.        STOP taking these medications       apixaban ANTICOAGULANT (ELIQUIS) 5 MG tablet Comments:   Reason for Stopping:             Allergies   Allergies   Allergen Reactions    Ibuprofen Nausea and Vomiting    Shrimp     Sulfa Antibiotics Rash     Patient started on bactrim 7/24/24 tolerating medication without rash on 7/25      "

## 2025-02-25 NOTE — PROGRESS NOTES
[facility] Nurse Inpatient Assessment     Consulted for: coccyx, NEW consult 2/11 for groin and bilateral lower legs    Summary: Site of previously healed pressure injury, seen on last admit 1/27/25, now with 3 open areas.    2/11: Sacral wound is stable. Patient with skin tears on bilateral lower legs and arms from edema. Consult for groin wounds however bleeding noted to be vaginal, not from a wound in the groin.     2/25: All wounds are stable and healing, no change in plan of care.      [facility] nurse follow-up plan: weekly    Patient History (according to provider note(s):      Alis Hartman is a 69 year old woman admitted on 2/5/2025. She has a history of paranoid schizophrenia, lower extremity edema, cervical cancer, ESBL UTI, atrial fibrillation, gastric bypass and CKD who is admitted from home with worsening BLE edema and new coccyx sores.  The patient was just in the hospital from 1/25-27 with hematuria and pyuria.  She had her catheter exchanged and while PT/OT recommend TCU she elected to return home.     Assessment:      Areas visualized during today's visit: Sacrum/coccyx    Pressure Injury Location: Sacrum/coccyx     2/6 2/18    Last photo: 2/18/25  Wound type: Pressure Injury and Shear     Pressure Injury Stage: site of previously healed pressure injury, present on admission     Wound history/plan of care:   unknown worst stage, currently appears to be a stage 2 but was possibly deeper. Was noted as a reinjury on her last assessment here 12/4/24 and 1/27/25  Wound base: 100 % Fibrin     Palpation of the wound bed: normal      Drainage: small     Description of drainage: none     Measurements (length x width x depth, in cm) 0.5  x 0.2  x  0.1 cm, 0.4 x 0.2 x 0.1 cm and 0.4 x 0.3 x 0.1 cm   Periwound skin: Intact and Erythema- blanchable      Color: normal and consistent with surrounding  tissue      Temperature: normal   Odor: none  Pain: moderate, tender  Pain intervention prior to dressing change: slow and gentle cares   Treatment goal: Heal   STATUS: stable  Supplies ordered: discussed with RN and discussed with patient     Wound Location: Bilateral lower legs and arms      2/11 Left anterior lower leg                            2/18  Left anterior lower leg        2/11 Right anterior lower leg                         2/18 Right anterior lower leg    Last photo: 2/18/2025  Wound due to: Skin Tear vs blistering  Wound history/plan of care: Patient with blistering from edema which have unroofed  Wound base: mix of slough and dermis     Palpation of the wound bed: normal      Drainage: scant     Description of drainage: serous     Measurements (length x width x depth, in cm): 2 cm x 2.3 cm x 0.1 cm     Tunneling: N/A     Undermining: N/A  Periwound skin: Intact      Color: pink      Temperature: normal   Odor: none  Pain: moderate, aching  Pain interventions prior to dressing change: slow and gentle cares   Treatment goal: Protection  STATUS: healing  Supplies ordered: gathered    Treatment Plan:     Sacrum  Q shift, Every 3 days and as needed  Peel back dressing Q shift for evaluation, reapply or change as needed   Cleanse the area with NS and pat dry.  Apply No sting film barrier to periwound skin.  Dust open areas with ostomy powder and pat into place.   Cover wound with Sacral Mepilex (#[id number])  Turn and reposition Q 2hrs side to side only.  Ensure pt has Tanner-cushion while sitting up in the chair.  Ensure isoflex pump on bed and set  FYI- If pt has constant incontinent loose stools needing dressing changes Q shift please discontinue the Mepilex dressing and apply criticaid barrier paste BID and PRN.      Bilateral lower legs and arm wounds: Every other day: remove dressings and wash wounds with Vashe and gauze. Pat dry. Cover open areas with Vaseline gauze and secure with large Mepilex or ABD  and stretch netting. If using Mepilex, note patient has very delicate skin so care should be taken to prevent worsening skin tears when removing.     Orders: Reviewed    RECOMMEND PRIMARY TEAM ORDER: None, at this time  Education provided: importance of repositioning, plan of care, and wound progress  Discussed plan of care with: Patient and Nurse  Notify WOC if wound(s) deteriorate.  Nursing to notify the Provider(s) and re-consult the WOC Nurse if new skin concern.    DATA:     Current support surface: Standard  Standard gel mattress (Isoflex)  Containment of urine/stool: Incontinence Protocol  BMI: Body mass index is 23.59 kg/m .   Active diet order: Orders Placed This Encounter      Combination Diet Regular Diet Adult      Diet     Output: I/O last 3 completed shifts:  In: 1126 [P.O.:1126]  Out: -      Labs:   Recent Labs   Lab 02/22/25  0625   HGB 10.5*   WBC 7.7     Pressure injury risk assessment:   Sensory Perception: 2-->very limited  Moisture: 3-->occasionally moist  Activity: 2-->chairfast  Mobility: 2-->very limited  Nutrition: 1-->very poor  Friction and Shear: 1-->problem  Yogi Score: 11    Kerry Holbrook RN CWOCN  Pager no longer is use, please contact through [email or web address] group: [facility] Nurse [facility]  Dept. Office Number: [phone]

## 2025-02-26 LAB
ANION GAP SERPL CALCULATED.3IONS-SCNC: 14 MMOL/L (ref 7–15)
BACTERIA UR CULT: ABNORMAL
BACTERIA UR CULT: ABNORMAL
BUN SERPL-MCNC: 23.8 MG/DL (ref 8–23)
CALCIUM SERPL-MCNC: 9.6 MG/DL (ref 8.8–10.4)
CHLORIDE SERPL-SCNC: 105 MMOL/L (ref 98–107)
CREAT SERPL-MCNC: 0.83 MG/DL (ref 0.51–0.95)
EGFRCR SERPLBLD CKD-EPI 2021: 75 ML/MIN/1.73M2
GLUCOSE SERPL-MCNC: 87 MG/DL (ref 70–99)
HCO3 SERPL-SCNC: 22 MMOL/L (ref 22–29)
HOLD SPECIMEN: NORMAL
POTASSIUM SERPL-SCNC: 3.6 MMOL/L (ref 3.4–5.3)
SODIUM SERPL-SCNC: 141 MMOL/L (ref 135–145)

## 2025-02-26 PROCEDURE — 250N000013 HC RX MED GY IP 250 OP 250 PS 637: Performed by: INTERNAL MEDICINE

## 2025-02-26 PROCEDURE — 250N000013 HC RX MED GY IP 250 OP 250 PS 637: Performed by: CLINICAL NURSE SPECIALIST

## 2025-02-26 PROCEDURE — 99232 SBSQ HOSP IP/OBS MODERATE 35: CPT | Performed by: INTERNAL MEDICINE

## 2025-02-26 PROCEDURE — 250N000013 HC RX MED GY IP 250 OP 250 PS 637: Performed by: STUDENT IN AN ORGANIZED HEALTH CARE EDUCATION/TRAINING PROGRAM

## 2025-02-26 PROCEDURE — 36591 DRAW BLOOD OFF VENOUS DEVICE: CPT | Performed by: INTERNAL MEDICINE

## 2025-02-26 PROCEDURE — 999N000007 HC SITE CHECK

## 2025-02-26 PROCEDURE — 120N000002 HC R&B MED SURG/OB UMMC

## 2025-02-26 PROCEDURE — 250N000011 HC RX IP 250 OP 636: Performed by: INTERNAL MEDICINE

## 2025-02-26 PROCEDURE — 80048 BASIC METABOLIC PNL TOTAL CA: CPT | Performed by: INTERNAL MEDICINE

## 2025-02-26 PROCEDURE — 82310 ASSAY OF CALCIUM: CPT | Performed by: INTERNAL MEDICINE

## 2025-02-26 RX ORDER — LEVOFLOXACIN 500 MG/1
500 TABLET, FILM COATED ORAL DAILY
Status: DISCONTINUED | OUTPATIENT
Start: 2025-02-26 | End: 2025-02-27 | Stop reason: HOSPADM

## 2025-02-26 RX ORDER — TRAZODONE HYDROCHLORIDE 50 MG/1
50 TABLET ORAL AT BEDTIME
Status: DISCONTINUED | OUTPATIENT
Start: 2025-02-26 | End: 2025-02-27 | Stop reason: HOSPADM

## 2025-02-26 RX ORDER — HYDROMORPHONE HYDROCHLORIDE 1 MG/ML
0.3 INJECTION, SOLUTION INTRAMUSCULAR; INTRAVENOUS; SUBCUTANEOUS ONCE
Status: COMPLETED | OUTPATIENT
Start: 2025-02-26 | End: 2025-02-26

## 2025-02-26 RX ADMIN — Medication 15 MG: at 08:35

## 2025-02-26 RX ADMIN — OXYCODONE HYDROCHLORIDE 20 MG: 10 TABLET ORAL at 00:56

## 2025-02-26 RX ADMIN — ACETAMINOPHEN 975 MG: 325 TABLET, FILM COATED ORAL at 14:04

## 2025-02-26 RX ADMIN — Medication 1 TABLET: at 08:35

## 2025-02-26 RX ADMIN — OXYCODONE HYDROCHLORIDE 20 MG: 10 TABLET ORAL at 05:03

## 2025-02-26 RX ADMIN — OXYCODONE HYDROCHLORIDE 20 MG: 10 TABLET ORAL at 18:12

## 2025-02-26 RX ADMIN — OXYCODONE HYDROCHLORIDE 20 MG: 10 TABLET ORAL at 09:05

## 2025-02-26 RX ADMIN — SENNOSIDES AND DOCUSATE SODIUM 1 TABLET: 50; 8.6 TABLET ORAL at 08:35

## 2025-02-26 RX ADMIN — Medication 5 ML: at 11:49

## 2025-02-26 RX ADMIN — LEVOFLOXACIN 500 MG: 500 TABLET, FILM COATED ORAL at 11:54

## 2025-02-26 RX ADMIN — OXYCODONE HYDROCHLORIDE 20 MG: 10 TABLET ORAL at 22:37

## 2025-02-26 ASSESSMENT — ACTIVITIES OF DAILY LIVING (ADL)
ADLS_ACUITY_SCORE: 72
ADLS_ACUITY_SCORE: 76
ADLS_ACUITY_SCORE: 72

## 2025-02-26 NOTE — PROGRESS NOTES
"[facility]    Medicine Progress Note - Hospitalist Service, GOLD TEAM 22    Date of Admission:  2/5/2025    Assessment & Plan   \"Aranza\" Alis Hartman is a 69 year old woman admitted on 2/5/2025. 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    Changes:   Planned for discharge home today, but cancelled late in the day as supplies unable to be delivered    # MADHU on CKD3 - resolved, then worse as of 2/21  # hyponatremia, likely intra-vascularly hypovolemic - resolved  then worse as of 2/21   Cr worsened on admission compared to prior, slowly worsening since 1/25. Overall studies suggest prerenal but iniitally didn't improve much with small IVF+ encouraging PO intake but this has been limited. History of  hydronephrosis related to her endometrial cancer s/p PNT removal, L just in December 2024. Cystatin C expectedly lower, GFR estimated to be <30. Hyponatremia has been worsening in the last few weeks in the setting of poor PO intake, now likely prerenal MADHU, poor nutrition. Renal US negative for hydronephrosis. Cr peaked at 1.44, now Cr and electrolytes improving on IV albumin, now almost back to prior baseline.  Suspect prerenal in the setting of low oncotic pressure; responsive to albumin now back to baseline.  Creatinine back to baseline, last albumin was 2/12. Stable creatinine without albumin for many days with stable lower extremity edema on 2 L fluid restriction.  - Cr daily, stable (see above re: MADHU)  - 2L fluid restriction but still encouraging her to drink enough; has had good intake  - " renal consulted, appreciate recs, have since signed off but should follow up outpatient given CKD   -2/20 and 2/21- creat rising- suspect due to pre-renal etiology due to poor PO intake. Provided small amt of IV fluid on 2/21-- labs plataeued-- giving albumin 2/23    # Chronic bilateral lower extremity edema  # Deconditioning  # Sacral pressure ulcer, POA, worsening   # Severe malnutrition in the context of chronic illness   Hx of chronic edema, no PTA diuretics. Echo 11/24 reassuring EF but poor study, cant comment on diastolic function, edema likely due to some degree of proteinuria and low oncotic pressure with low albumin/malnutrition.  Last hospitalization recommendation was to transitional care unit last hospitalization, however per patient preference and for her mental health discharged to home with home care and family support in place. Unfortunately symptoms continued to progress at home.  Improved swelling with therapies as below  - Followed now by lymphedema, WOC RN, PT,  RD   - Social work consulted re:placement/discharge planning   - 2L fluid restriction  - Relayed daughter's encourage to PT and OT to call daughter if patient is not participating in therapies  - Care conference 2/18 (see above)    #Acute on Chronic anemia (multifactorial), stable  #Vaginal bleeding  #Gross Hematuria  #Iron Deficiency Anemia  Likely multifactorial 2/2 malignancy, renal disease, nutrition. Likely hemoconcentrated at admission, down to 7.5 and recent baseline appears near this.  Continues to have bleeding on 12/7 with hemoglobin down to 7.2.  UA with large blood but patient has bright red blood in opening of vagina on exam per WOC nurse.  Therefore, suspect vaginal source.  Vaginal bleeding present on digital exam with minimal tolerability from patient and No overt fungating mass or vaginal abnormality noted, possible nodularity in right vaginal apex vs agglutination per gyn-onc. Continued blood in depends; appears to be  in vaginal canal per [facility] assessment - GYN-ONC referral placed.  CT A/P with IV contrast ordered to assess for disease recurrence for prognostication discussions given new onset vaginal bleeding - per gyn-onc, likely etiology is atrophic vagina but she has a high risk for recurrence with this histology.  Patient unable to lie flat for CT imaging.  Hgb 5.9; 1 unit of pRBC ordered (consented daughter as patient somnolent from anxiolytic prior to CT scan) on 2/14.  No clear documentation of continued vaginal bleeding since stopping DOAC as of 2/14.  Normal ferritin on while iron.  Iron studies possibly consistent with some component of iron deficiency with anemia of CKD/chronic disease.  Folate and B12 wnl.    - Hold DOAC for now 2/12 (discussed risk and benefit with Aranza on 2/12) given vaginal bleeding and anemia  - Monitor for vaginal bleeding; still having intermittent spotting  - Hgb daily; hgb goal >7 (already consented) - stable for multiple days  - Continue Ferrous Sulfate drops daily     Diffuse pruritus  Unclear etiology but would wonder if she has normal skin with abnormal sensation since there is no obvious dermatitis on areas she mentioned are itching  Recommended considering topical hydrocortisone on areas of itching    Recurrent hospitalizations with progressive step-wise decline  Severe malnutrition  Serous endometrial carcinoma  Advanced care planning Discussion  See notes from 2/18    #Chronic Pain  #?Acute on Chronic Pain  Patient reporting thigh, low back pain.  Continues to report moderate to severe pain at times despite current regimen and recent adjustments.  Per nursing, patient often appears comfortable and then cries when they walk into the room.  Not utilizing IV dilaudid much at this point.  No sedation after 15mg doses.  Previously reported abdominal pain much better.  Sacral and thigh pain still uncontrolled per patient report.  Much improved pain on 12/16 based on my assessment, the  daughters, and the nurse.  Pain continues to be much improved on oxy 20mg q4h prn.  - Stopped scheduled PO hydromorphone doses given sedation  - Increase oxycodone 15 mg q4h PRN (her home dose) --> 20mg q4h prn after discussion with palliative care on 2/15   - Stop IV Dilaudid as needed on 2/15  - Changed acetaminophen to 975 mg TID     #Hx of ESBL urosepsis and bacteremia  #Bladder dysfunction s/p cystostomy and suprapubic catheter  Recently admitted 11/26 - 12/8/2024 for ESBL urosepsis and bacteremia requiring ICU w/ L nephrostomy tube (removed 1/7) treated w/ ertapenem, returned 1/25-1/27 for concern for UTI but no clear infection at that time.  ID was consulted that admission and recommended avoiding frequent UA and Ucx checks in future unless patient w/ symptoms of UTI.   - UA/Ucx abnormal at admission, likely colonization, will continue to monitor off antibiotics   - may be overdue for suprapubic cath exchange - will look into this - patient reports this was changed about 1.5 weeks ago; will confirm this with daughter as I can't find indication of this in the chart  - UA sample on 2/16 to assess for continued presence of blood (off suprapubic cath that was not replaced; not reliable for infectious assessment)   --as of 2/21-Will place urology consult for removal and replacement of her suprapubic cathter placement as we would ideally like to collect a non-contaminated urine specimen to evaluate for UTI due to her new onset paranoia (although there are other possible causes as well)-- urology replaced 2/23    #Serous endometrial carcinoma  Follows w/ heme/onc through [place]. S/p SNEHA, BSO 07/2024 s/p cycle 3 carbo/taxel 10/15/2024, cycle 4 delayed in s/o recent admission, family ultimately decided to forgo any further treatment.   During care conference, it was expressed that, her cancer is likely to recur due to it being an aggressive type but when it will recur is unknown. However, should it recur, GynOnc  team expressed that, because of her functional status (significant weakness) and malnutrition, treatment would not be recommended since that would worsen her health overall and benefits would not outweigh the risks.    Mouth sores/ulcers:  -Need to discuss etiology of mouth sores/ulcers with dental     *If given anxiolytic for CT scan, would give less than IV Ativan 1mg (somnolent following this size dose on 2/13)           Diet: Combination Diet Regular Diet Adult  Snacks/Supplements Adult: Other; Send chocolate Ensure Enlive w/ breakfast, strawberry Ensure+HP with dinner; With Meals  Fluid restriction 2000 ML FLUID  Diet    DVT Prophylaxis: VTE Prophylaxis contraindicated due to bleeding  Shahid Catheter: Not present  Lines: PRESENT      Port a Cath 02/05/25 Single Lumen Right Chest wall-Site Assessment: WDL      Cardiac Monitoring: None  Code Status: Full Code      Clinically Significant Risk Factors         # Hyponatremia: Lowest Na = 134 mmol/L in last 2 days, will monitor as appropriate       # Hypoalbuminemia: Lowest albumin = 1.9 g/dL at 2/5/2025  2:52 PM, will monitor as appropriate     # Hypertension: Noted on problem list             # Severe Malnutrition: based on nutrition assessment    # Financial/Environmental Concerns: unable to afford rent/mortgage (pt interested in resources/info on getting assistance paying rent, as well as applying for county benefits)         Social Drivers of Health            Disposition Plan     Medically Ready for Discharge: Anticipated Tomorrow             Adrian Jaramillo MD  Hospitalist Service, GOLD TEAM 22  [facility]  Securely message with [employer] (more info)  Text page via [facility] Paging/Directory   See signed in provider for up to date coverage information  ______________________________________________________________________    Interval History   No fever/chills. No major events overnight.     Physical Exam   Vital Signs: Temp:  98.5  F (36.9  C) Temp src: Oral BP: (!) 132/92 Pulse: 92   Resp: 18 SpO2: 97 % O2 Device: None (Room air)    Weight: 133 lbs 2.53 oz  Constitutional: Sitting on bed, falling asleep  Head: Normocephalic. Atraumatic.   Resp: breathing comfortably on room air  Gastrointestinal: Abdomen appears non-distended  Musculoskeletal: severely swollen legs, cachexia in upper body  Skin: exfoliation and desquamation throughout  Psychiatric: mentation flat    Medical Decision Making   45 MINUTES SPENT BY ME on the date of service doing chart review, history, exam, documentation & further activities per the note.      Data     I have personally reviewed the following data over the past 24 hrs:    N/A  \   N/A   / N/A     139 103 27.2 (H) /  91   3.5 19 (L) 0.96 (H) \

## 2025-02-26 NOTE — PROGRESS NOTES
Care Management Follow Up    Length of Stay (days): 21    Expected Discharge Date: 02/27/2025     Concerns to be Addressed: discharge planning     Patient plan of care discussed at interdisciplinary rounds: Yes    Anticipated Discharge Disposition: Home (w/ 24/7 family support)     Anticipated Discharge Services: County Worker, Home Care  Anticipated Discharge DME:  ([facility] ordered santo lift [including large size sling w/ commode opening] and hospital bed w/ gel overlay; pt has wheelchair and commode at home)    Patient/family educated on Medicare website which has current facility and service quality ratings: no (N/A at this time)  Education Provided on the Discharge Plan: Yes  Patient/Family in Agreement with the Plan: yes    Referrals Placed by CM/SW: Homecare  Private pay costs discussed: Not applicable    Discussed  Partnership in Safe Discharge Planning  document with patient/family: No     Handoff Completed: Not currently (EHO needed at discharge)    Additional Information:    Home Care Agency:  [facility] ([facility])  Ph: [phone]  Fx: [phone]  Services to be provided: RN/PT/OT/HHA  ______________________________________    10:00am - Case discussed in rounds this AM.  Team updated to discharge plan for this evening, pending DME delivery and wound care teaching.    11:23am - Called [employer] at [phone], spoke w/ Nova, she stated that their records show all equipment was delivered in the initial delivery and there is no documentation about anything missing from the order or a  needing to make another trip to the delivery location.    11:35am - Called Joy, informed her of ride time scheduled for today.  Inquired if the remainder of DME that she reported was missing was delivered, she stated she will call her son to find out.  Inquired about when she will be in for wound care training, she stated maybe this afternoon, has an appt at 12:45pm.   "Explained that pt cannot be kept in-hospital if the teaching is not completed.  Explained that home care would be able to teach post-discharge, but that it would be ideal for Joy to have education on the cares so she can step in as needed prior to home care resuming services.  She expressed understanding, stated she has done wound care before, so is not particularly concerned about getting the teaching or not.  Regarding medical readiness to discharge, Joy stated pt called her a number of times last night and Joy believes pt has a UTI.  She would like to hear from the provider.  No further questions/concerns for this writer at this time.    Updated provider to the above conversation w/ Joy, provider acknowledged.  He explained that it would be of great benefit to get kidney ultrasound, but pt has consistently declined this.  He will call Joy and discuss her concerns further.    Received update from provider that he spoke w/ Joy.  Plan is for Joy to come in to hospital at 2:00pm today and encourage pt to proceed w/ kidney US.  Depending upon how things go this afternoon, pt's discharge may/may not need to be delayed until tomorrow.  Provider and this writer to reconvene later this afternoon to reassess feasibility of proceeding w/ discharge today.    2:58pm - Reached out to provider, he stated pt's dtr has not arrived yet.  He advised rescheduling transport to tomorrow.  In the event ultrasound is refused tomorrow AM, pt will be discharged.    3:35pm - Asked bedside RN if she has heard from pt's dtr, she stated she just finished speaking w/ her.  Per RN, Joy reports she will be in to the hospital tomorrow sometime between 9:00am and 11:00am.    Updated [facility] liaison to discharge tomorrow.    4:08pm - Called Joy, inquired about DME.  She initially stated she believes it is all delivered but then stated she is \"on the phone with them right now\" to confirm it.  Regarding discharge " "transport, she confirmed that this writer rescheduling ride to tomorrow around the same time would be acceptable.    Called [facility] transport ([phone]), spoke w/ Ling, rescheduled stretcher ride to tomorrow w/ pickup window of [phone] (same as previous window).      Next Steps:    Stretcher ride is scheduled for discharge to home on 2/27 w/ pickup window of [phone].    - Follow up w/ pt on her progress in completing HCD    - Follow up w/ Joy on DME delivery (ambiguous as to if all components of hospital bed were received; can ask tomorrow AM when she comes in to hospital)    - Nursing staff to provide training/education on wound cares, as well as provide wound care supplies, before discharge    - Ensure discharge orders include specific orders to follow for completion of wound care    - Send orders to [facility] at discharge    - Complete electronic PCS form          LUÍS Medrano  [facility]  Units 8M/S & 10 ICU  Reachable on [employer] - Search by name or search \"[facility]\"  Phone: [phone]  "

## 2025-02-26 NOTE — PROGRESS NOTES
Brief urology note    Per primary team, patient persists in declining renal bladder ultrasound. Family is aware that urology recommends RBUS prior to discharge but are deferring to patient wishes. Patient is frequently confused and calling for help. Family prefers discharge to home, hoping that she will be more comfortable in that environment.     Cr declining; today is 0.96 with baseline approx 0.8 to 0.9.    Appreciate excellent medicine cares. Urology remains available for any new concerns.

## 2025-02-26 NOTE — PLAN OF CARE
Goal Outcome Evaluation:      Plan of Care Reviewed With: patient    Overall Patient Progress: no changeOverall Patient Progress: no change  Shift [id number]  VS: Temp: 98.8  F (37.1  C) Temp src: Oral BP: 132/82 Pulse: 92   Resp: 18 SpO2: 96 % O2 Device: None (Room air)      O2: Stable On ra, O2 > 90%. denies SOB, cough, & chest pain.    Output: Incont bowel, suprapubic cath draining adequately   Last BM: 2/24/2024   Activity: Assist x 2 with angelica steady    Skin: No new significant finding   Pain: Rated 10/10   Neuro: A&Ox 3.    Dressing: CDI   Diet: Regular diet   LDA: Right chest port SL   Equipment: Personal items   Plan: POC ongoing. Pt able to make needs known, call light within reach.   Additional Info: Pt had been screaming and crying since around 2300. Reposition, redirection, and needs were provided, but pt continued crying and being anxious. Provider paged and went to talk to pt and pt's daughter. Bedside attendant ordered.

## 2025-02-26 NOTE — PLAN OF CARE
"Goal Outcome Evaluation:      VS: /85 (BP Location: Left arm, Patient Position: Sitting, Cuff Size: Adult Regular)   Pulse 92   Temp 98.5  F (36.9  C) (Oral)   Resp 18   Ht 1.6 m (5' 3\")   Wt 60.4 kg (133 lb 2.5 oz)   SpO2 97%   BMI 23.59 kg/m       O2: Stable ORA   Output: Suprapubic cath draining adequately   Last BM: 2/24/25   Activity: Bedside attendant   Up for meals? Yes   Skin: Wound on sacrum, BLE, BL elbow   Pain: Reports pain at 9/10   CMS: AO x3   Dressing: Sacrum, BLE and elbow. Declined sacrum wound care change this shift. Allowed for BLE change only   Diet: Regular   LDA: Rt chest port   Equipment: Gait belt, Sonia Steady   Plan: Possible discharge 2/26   Additional Info: US ordered again, pt ignored writer when asked if we could get her ready for US. Writer spoke to MD who stated daughter would come around 2pm and we could retry at that time. US notified of this decision  @[id number] writer circled back with Pt on US and educated her on benefits of doing the US, pt still did not give a yes/no response. Writer asked if daughter was coming based off their conversation while writer was in the room, Pt stated \"I don't think she is coming today\"  @[id number] writer called daughter Joy to get an ETA on her arrival, she stated she was waiting on someone to come over and wasn't sure on time and mentioned she could come in between 9-11am tomorrow 2/27/25 for US and wound care education. This was communicated to MD,  and Care-coordinator          "

## 2025-02-26 NOTE — PROGRESS NOTES
"[facility]    Medicine Progress Note - Hospitalist Service, GOLD TEAM 22    Date of Admission:  2/5/2025    Assessment & Plan   \"Aranza\" Alis Hartman is a 69 year old woman admitted on 2/5/2025. 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    Changes:   Currently planning for discharge today, but ideally would obtain renal US (she has declined it every time up until now), initiating levofloxacin for possible UTI, and waiting delivery of home supplies.  Planned for discharge home today pending the above; in addition, pending her daughter's ability to come in and assess her this afternoon, as well    # MADHU on CKD3 - resolved, then worse as of 2/21  # hyponatremia, likely intra-vascularly hypovolemic - resolved  then worse as of 2/21   Cr worsened on admission compared to prior, slowly worsening since 1/25. Overall studies suggest prerenal but iniitally didn't improve much with small IVF+ encouraging PO intake but this has been limited. History of  hydronephrosis related to her endometrial cancer s/p PNT removal, L just in December 2024. Cystatin C expectedly lower, GFR estimated to be <30. Hyponatremia has been worsening in the last few weeks in the setting of poor PO intake, now likely prerenal MADHU, poor nutrition. Renal US negative for hydronephrosis. Cr peaked at 1.44, now Cr and electrolytes improving on IV albumin, now almost back to prior baseline.  Suspect prerenal in the setting of low oncotic pressure; responsive to albumin now back to baseline.  Creatinine back to baseline, last " albumin was 2/12. Stable creatinine without albumin for many days with stable lower extremity edema on 2 L fluid restriction.  - Cr daily, stable (see above re: MADHU)  - 2L fluid restriction but still encouraging her to drink enough; has had good intake  - renal consulted, appreciate recs, have since signed off but should follow up outpatient given CKD   -2/20 and 2/21- creat rising- suspect due to pre-renal etiology due to poor PO intake. Provided small amt of IV fluid on 2/21-- labs plataeued-- giving albumin 2/23    # Chronic bilateral lower extremity edema  # Deconditioning  # Sacral pressure ulcer, POA, worsening   # Severe malnutrition in the context of chronic illness   Hx of chronic edema, no PTA diuretics. Echo 11/24 reassuring EF but poor study, cant comment on diastolic function, edema likely due to some degree of proteinuria and low oncotic pressure with low albumin/malnutrition.  Last hospitalization recommendation was to transitional care unit last hospitalization, however per patient preference and for her mental health discharged to home with home care and family support in place. Unfortunately symptoms continued to progress at home.  Improved swelling with therapies as below  - Followed now by lymphedema, WOC RN, PT,  RD   - Social work consulted re:placement/discharge planning   - 2L fluid restriction  - Relayed daughter's encourage to PT and OT to call daughter if patient is not participating in therapies  - Care conference 2/18 (see above)    #Acute on Chronic anemia (multifactorial), stable  #Vaginal bleeding  #Gross Hematuria  #Iron Deficiency Anemia  Likely multifactorial 2/2 malignancy, renal disease, nutrition. Likely hemoconcentrated at admission, down to 7.5 and recent baseline appears near this.  Continues to have bleeding on 12/7 with hemoglobin down to 7.2.  UA with large blood but patient has bright red blood in opening of vagina on exam per WOC nurse.  Therefore, suspect vaginal  source.  Vaginal bleeding present on digital exam with minimal tolerability from patient and No overt fungating mass or vaginal abnormality noted, possible nodularity in right vaginal apex vs agglutination per gyn-onc. Continued blood in depends; appears to be in vaginal canal per WOC assessment - GYN-ONC referral placed.  CT A/P with IV contrast ordered to assess for disease recurrence for prognostication discussions given new onset vaginal bleeding - per gyn-onc, likely etiology is atrophic vagina but she has a high risk for recurrence with this histology.  Patient unable to lie flat for CT imaging.  Hgb 5.9; 1 unit of pRBC ordered (consented daughter as patient somnolent from anxiolytic prior to CT scan) on 2/14.  No clear documentation of continued vaginal bleeding since stopping DOAC as of 2/14.  Normal ferritin on while iron.  Iron studies possibly consistent with some component of iron deficiency with anemia of CKD/chronic disease.  Folate and B12 wnl.    - Hold DOAC for now 2/12 (discussed risk and benefit with Aranza on 2/12) given vaginal bleeding and anemia  - Monitor for vaginal bleeding; still having intermittent spotting  - Hgb daily; hgb goal >7 (already consented) - stable for multiple days  - Continue Ferrous Sulfate drops daily     Diffuse pruritus  Unclear etiology but would wonder if she has normal skin with abnormal sensation since there is no obvious dermatitis on areas she mentioned are itching  Recommended considering topical hydrocortisone on areas of itching    Recurrent hospitalizations with progressive step-wise decline  Severe malnutrition  Serous endometrial carcinoma  Advanced care planning Discussion  See notes from 2/18    #Chronic Pain  Acute on Chronic Pain  Patient reporting thigh, low back pain.  Continues to report moderate to severe pain at times despite current regimen and recent adjustments.  Per nursing, patient often appears comfortable and then cries when they walk into the  room.  Not utilizing IV dilaudid much at this point.  No sedation after 15mg doses.  Previously reported abdominal pain much better.  Sacral and thigh pain still uncontrolled per patient report.  Much improved pain on 12/16 based on my assessment, the daughters, and the nurse.  Pain continues to be much improved on oxy 20mg q4h prn.  - Stopped scheduled PO hydromorphone doses given sedation  - Increase oxycodone 15 mg q4h PRN (her home dose) --> 20mg q4h prn after discussion with palliative care on 2/15   - Stop IV Dilaudid as needed on 2/15  - Changed acetaminophen to 975 mg TID     #Hx of ESBL urosepsis and bacteremia  #Bladder dysfunction s/p cystostomy and suprapubic catheter  Recently admitted 11/26 - 12/8/2024 for ESBL urosepsis and bacteremia requiring ICU w/ L nephrostomy tube (removed 1/7) treated w/ ertapenem, returned 1/25-1/27 for concern for UTI but no clear infection at that time.  ID was consulted that admission and recommended avoiding frequent UA and Ucx checks in future unless patient w/ symptoms of UTI.   - UA/Ucx abnormal at admission, likely colonization, will continue to monitor off antibiotics   - may be overdue for suprapubic cath exchange - will look into this - patient reports this was changed about 1.5 weeks ago; will confirm this with daughter as I can't find indication of this in the chart  - UA sample on 2/16 to assess for continued presence of blood (off suprapubic cath that was not replaced; not reliable for infectious assessment)   --as of 2/21-Will place urology consult for removal and replacement of her suprapubic cathter placement as we would ideally like to collect a non-contaminated urine specimen to evaluate for UTI due to her new onset paranoia (although there are other possible causes as well)-- urology replaced 2/23.  Continue to attempt renal US: today will attempt to have her daughter in hospital while it's happening, to encourage patient    #Serous endometrial  carcinoma  Follows w/ heme/onc through [facility]. S/p SNEHA, BSO 07/2024 s/p cycle 3 carbo/taxel 10/15/2024, cycle 4 delayed in s/o recent admission, family ultimately decided to forgo any further treatment.   During care conference, it was expressed that, her cancer is likely to recur due to it being an aggressive type but when it will recur is unknown. However, should it recur, GynOnc team expressed that, because of her functional status (significant weakness) and malnutrition, treatment would not be recommended since that would worsen her health overall and benefits would not outweigh the risks.    Mental health, paranoia, hallucinations:  Unclear cause.  Recommended several different mental health medications, but her daughter is nervous to try any thing new, and things that tis will all improve once she is home in a stable environment.       *If given anxiolytic for CT scan, would give less than IV Ativan 1mg (somnolent following this size dose on 2/13)           Diet: Combination Diet Regular Diet Adult  Snacks/Supplements Adult: Other; Send chocolate Ensure Enlive w/ breakfast, strawberry Ensure+HP with dinner; With Meals  Fluid restriction 2000 ML FLUID  Diet    DVT Prophylaxis: VTE Prophylaxis contraindicated due to bleeding  Shahid Catheter: Not present  Lines: PRESENT      Port a Cath 02/05/25 Single Lumen Right Chest wall-Site Assessment: WDL      Cardiac Monitoring: None  Code Status: Full Code      Clinically Significant Risk Factors               # Hypoalbuminemia: Lowest albumin = 1.9 g/dL at 2/5/2025  2:52 PM, will monitor as appropriate     # Hypertension: Noted on problem list             # Severe Malnutrition: based on nutrition assessment    # Financial/Environmental Concerns: unable to afford rent/mortgage (pt interested in resources/info on getting assistance paying rent, as well as applying for county benefits)         Social Drivers of Health            Disposition Plan     Medically Ready for  Discharge: Anticipated Tomorrow or today             Adrian Jaramillo MD  Hospitalist Service, GOLD TEAM 22  [facility]  Securely message with [employer] (more info)  Text page via [employer] Paging/Directory   See signed in provider for up to date coverage information  ______________________________________________________________________    Interval History   No fever/chills. No major events overnight.     Physical Exam   Vital Signs: Temp: 98.5  F (36.9  C) Temp src: Oral BP: 120/85 Pulse: 92   Resp: 18 SpO2: 97 % O2 Device: None (Room air)    Weight: 133 lbs 2.53 oz  Constitutional: Sitting on bed, falling asleep  Head: Normocephalic. Atraumatic.   Resp: breathing comfortably on room air  Gastrointestinal: Abdomen appears non-distended  Musculoskeletal: severely swollen legs, cachexia in upper body  Skin: exfoliation and desquamation throughout  Psychiatric: mentation flat

## 2025-02-27 VITALS
HEART RATE: 100 BPM | HEIGHT: 63 IN | RESPIRATION RATE: 16 BRPM | WEIGHT: 133.16 LBS | OXYGEN SATURATION: 100 % | BODY MASS INDEX: 23.59 KG/M2 | TEMPERATURE: 98.6 F | SYSTOLIC BLOOD PRESSURE: 128 MMHG | DIASTOLIC BLOOD PRESSURE: 76 MMHG

## 2025-02-27 PROCEDURE — 250N000013 HC RX MED GY IP 250 OP 250 PS 637: Performed by: CLINICAL NURSE SPECIALIST

## 2025-02-27 PROCEDURE — 99239 HOSP IP/OBS DSCHRG MGMT >30: CPT | Performed by: INTERNAL MEDICINE

## 2025-02-27 PROCEDURE — 250N000013 HC RX MED GY IP 250 OP 250 PS 637: Performed by: STUDENT IN AN ORGANIZED HEALTH CARE EDUCATION/TRAINING PROGRAM

## 2025-02-27 PROCEDURE — 250N000011 HC RX IP 250 OP 636: Performed by: INTERNAL MEDICINE

## 2025-02-27 PROCEDURE — 250N000011 HC RX IP 250 OP 636: Performed by: STUDENT IN AN ORGANIZED HEALTH CARE EDUCATION/TRAINING PROGRAM

## 2025-02-27 PROCEDURE — 250N000013 HC RX MED GY IP 250 OP 250 PS 637: Performed by: INTERNAL MEDICINE

## 2025-02-27 RX ADMIN — ACETAMINOPHEN 975 MG: 325 TABLET, FILM COATED ORAL at 09:09

## 2025-02-27 RX ADMIN — Medication 5 ML: at 12:53

## 2025-02-27 RX ADMIN — OXYCODONE HYDROCHLORIDE 20 MG: 10 TABLET ORAL at 04:42

## 2025-02-27 RX ADMIN — Medication 15 MG: at 09:49

## 2025-02-27 RX ADMIN — OXYCODONE HYDROCHLORIDE 20 MG: 10 TABLET ORAL at 09:08

## 2025-02-27 RX ADMIN — SENNOSIDES AND DOCUSATE SODIUM 1 TABLET: 50; 8.6 TABLET ORAL at 09:48

## 2025-02-27 RX ADMIN — LEVOFLOXACIN 500 MG: 500 TABLET, FILM COATED ORAL at 09:47

## 2025-02-27 RX ADMIN — Medication 1 TABLET: at 09:47

## 2025-02-27 RX ADMIN — DIPHENHYDRAMINE HYDROCHLORIDE AND LIDOCAINE HYDROCHLORIDE AND ALUMINUM HYDROXIDE AND MAGNESIUM HYDRO 10 ML: KIT at 12:54

## 2025-02-27 ASSESSMENT — ACTIVITIES OF DAILY LIVING (ADL)
ADLS_ACUITY_SCORE: 76

## 2025-02-27 NOTE — PLAN OF CARE
"/76 (BP Location: Left arm)   Pulse 100   Temp 98.6  F (37  C) (Oral)   Resp 16   Ht 1.6 m (5' 3\")   Wt 60.4 kg (133 lb 2.5 oz)   SpO2 100%   BMI 23.59 kg/m    O2>90% ORA, denies feeling SOB, lightheadedness. Lungs clear, equal bilateral    Output: Incontinent of bladder, suprapubic catheter, continent of bowel   Last BM: 2/26/25   Activity: Ax2, w/Leiko Lift   Up for meals? Yes, poor appetite   Skin: Bilateral shin, R hip, Coccyx wounds   Pain: 10/10 pain, managed with scheduled and PRN medications   CMS: A&Ox3, disoriented to time   Dressing: Coccyx dressing, bilateral shin dressings, R hip dressing   Diet: Reg, denies nausea and vomiting    LDA: Suprapubic catheter, R chest port   Plan: Continue with plan of care   Additional Info: Pt to discharge today         Goal Outcome Evaluation:      Plan of Care Reviewed With: patient    Overall Patient Progress: no change Overall Patient Progress: no change           "

## 2025-02-27 NOTE — PLAN OF CARE
"Goal Outcome Evaluation:      Plan of Care Reviewed With: patient    Overall Patient Progress: no changeOverall Patient Progress: no change       VS: BP (!) 132/92 (BP Location: Left arm)   Pulse 104   Temp 98.2  F (36.8  C) (Oral)   Resp 18   Ht 1.6 m (5' 3\")   Wt 60.4 kg (133 lb 2.5 oz)   SpO2 100%   BMI 23.59 kg/m       Output/Last BM: Incontinent of bowel, LBM 2/26   Suprapubic cath draining adequately    Activity: Ax2 liko lift   attempted to use snehal steady pt refused to stand   Pt refused repositioning through out night    Skin/Dressing: Sacrum wound dressing changed   R hip wound dressing changed   R Sosa wound dressing changed   Elbows covered w/ mepliex    Pain: 10/10 pain in back and buttocks   PRN oxy q4    CMS: A&Ox3 disorientated to time    Diet: R/T/W  2000ml fluid restriction    LDA: R chest port heparin locked    Equipment: IV pole/ personal belongings    Plan: Renal ultrasound   Discharge home w/ home care    Additional Info: 1:1 sitter     Pt on and off crying throughout the night stating staff not helping despite 1:1 helping her with all requests     Pt refused all evening medications     Spoke w/ daughter about ultrasound tomorrow and daughter encouraged pt to take oxycodone for pain     Slept in recliner pt stated more comfortable for her              "

## 2025-02-27 NOTE — PLAN OF CARE
Pt. discharged at 5:40pm via stretcher and [facility] transport to home.  Pt. was accompanied by transportation techs, and left with personal belongings.  Prior to discharge, PIV was removed.  Pt. received complete discharge paperwork and discharge medications as filled by discharge pharmacy.  Pt. was given times of last dose for all discharge medications in writing on discharge medication sheets.  Discharge teaching included medication administration, pain management, activity restrictions, dressing changes, and signs and symptoms of infection.  Pt. to follow up with care provider.  Pt. had no further questions at the time of discharge and no unmet needs were identified.

## 2025-02-27 NOTE — PROGRESS NOTES
Care Management Discharge Note    Discharge Date: 02/27/2025       Discharge Disposition: Home (w/ 24/7 family support)    Discharge Services: County Worker, Home Care    Discharge DME: (RNCC ordered santo lift [including large size sling w/ commode opening] and hospital bed w/ gel overlay; pt has wheelchair and commode at home)     Discharge Transportation: [employer]ret[employer]    Private pay costs discussed: transportation costs    Does the patient's insurance plan have a 3 day qualifying hospital stay waiver?  No    PAS Confirmation Code: N/A  Patient/family educated on Medicare website which has current facility and service quality ratings: no (N/A at this time)    Education Provided on the Discharge Plan: Yes  Persons Notified of Discharge Plans: pt, dtr Joy, [employer], home care, provider, nursing staff  Patient/Family in Agreement with the Plan: yes    Handoff Referral Completed: Yes, non-[facility] PCP: External handoff communication completed    Additional Information:    Home Care Agency:  [facility])  Ph: [phone]  Fx: [phone]  Services to be provided: RN/PT/OT/HHA  ______________________________________    Case discussed w/ provider this AM.  Ultrasound still declined and pt's dtr unsure when she will be coming in to hospital.  Provider reported that he notified pt/family that ultrasound is being deferred to outpatient follow up and pt is discharging today.  Provider aware that wound care not taught at this time, but home care agency is secured.    9:55am - Reached out to bedside RN, notified her of stretcher ride time and provided reminder to provide wound care supplies at discharge (additional supplies will eventually be sourced by home care agency).  Requested RN to complete wound care teaching w/ Joy, in the event she comes to the hospital.  Also requested RN assistance in following up on pt's progress w/ DANNY, as pt was not speaking w/ this writer when  "this writer previously met w/ her.  Per RN, she will check in w/ pt later on HCD if possible - pt has been refusing meds and refusing to engage in conversation w/ staff today.  In the event HCD is not completed, pt can complete at home w/ family.    Notified [facility] liaison that pt will be discharging today.  Explained that Joy may not be taught wound cares before discharge, but that this writer would request all wound cares to be completed by bedside RN to allow [facility] a couple days to get out to pt's home.  Liaison acknowledged.    Requested bedside RN to complete all wound cares before discharge today, RN acknowledged.    11:40am - Called Joy, notified her of discharge time, of services retained w/ AC, and that she can come to the hospital today to learn wound care.  She stated she feels she will be \"good\" on the wound care w/o coming in.  She confirmed all DME is set up at home.  She had no questions/concerns for this writer and was in agreement w/ discharge this evening.    11:46am - Requested provider to add wound care instructions to discharge orders.    Electronic PCS form completed.    Sent orders to [facility] via [employer] [facility] tab.    No further care management intervention anticipated at this time.  Please re-consult if further needs arise.  Care management signing off.            LUÍS Medrano  [facility]ly [facility]  Units 8M/S & 10 ICU  Reachable on zkipster - Search by name or search \"RNCC\"  Phone: [phone]  "

## 2025-03-01 ENCOUNTER — PATIENT OUTREACH (OUTPATIENT)
Dept: CARE COORDINATION | Facility: CLINIC | Age: 70
End: 2025-03-01
Payer: COMMERCIAL

## 2025-03-01 ENCOUNTER — NURSE TRIAGE (OUTPATIENT)
Dept: NURSING | Facility: CLINIC | Age: 70
End: 2025-03-01
Payer: COMMERCIAL

## 2025-03-01 NOTE — PROGRESS NOTES
[facility]  Transitions of Care Outreach  Chief Complaint   Patient presents with    Clinic Care Coordination - Post Hospital       Most Recent Admission Date: 2/5/2025   Most Recent Admission Diagnosis: Bilateral lower extremity edema - R60.0  Pressure injury of sacral region, stage 1 - L89.151     Most Recent Discharge Date: 2/27/2025   Most Recent Discharge Diagnosis: Bilateral lower extremity edema - R60.0  Pressure injury of sacral region, stage 1 - L89.151  Wound pain - T14.8XXA  Malignant neoplasm of overlapping sites of cervix (H) - C53.8  Insomnia, unspecified type - G47.00  Urinary tract infection associated with cystostomy catheter, initial encounter - T83.510A, N39.0     Transitions of Care Assessment    Discharge Assessment  How are you doing now that you are home?: She's doing good, just having a little bit of confusion  How are your symptoms? (Red Flag symptoms escalate to triage hotline per guidelines): Improved  Do you know how to contact your clinic care team if you have future questions or changes to your health status? : Yes (CHW provided nurse triage if patient's grandson or mother would like to speak to an RN regarding confusion)  Does the patient have their discharge instructions? : Yes  Does the patient have questions regarding their discharge instructions? : Yes (see comment) (Pt's grandson asked when homecare will start their services. CHW recommended contacting the home care agency, pt's grandson said he has their number and will contact them.)  Were you started on any new medications or were there changes to any of your previous medications? : Yes  Does the patient have all of their medications?: Yes  Do you have questions regarding any of your medications? : Yes (see comment) (Pt's grandson wondering if patient should be taking antibiotic. CHW did not see an antibiotic on pt's AVS, recommended speaking with her PCP for further guidance and follow-up)  Do you have all  of your needed medical supplies or equipment (DME)?  (i.e. oxygen tank, CPAP, cane, etc.): Yes    Post-op (CHW CTA Only)  If the patient had a surgery or procedure, do they have any questions for a nurse?: No    Follow up Plan     Discharge Follow-Up  Discharge follow up appointment scheduled in alignment with recommended follow up timeframe or Transitions of Risk Category? (Low = within 30 days; Moderate= within 14 days; High= within 7 days): No  Patient's follow up appointment not scheduled: Patient declined scheduling support. Education on the importance of transitions of care follow up. Provided scheduling phone number. (Pt grandson or daughter will contact PCP office this week to schedule follow-up)    Future Appointments   Date Time Provider Department Center   5/20/2025  2:15 PM Dany Perez MD [facility] [place]       Outpatient Plan as outlined on AVS reviewed with patient.    For any urgent concerns, please contact our 24 hour nurse triage line: [phone] ([phone])         Kathy Patiño  Community Health Worker  Connected Care Resource [facility]    *Connected Care Resource Team does NOT follow patient ongoing. Referrals are identified based on internal discharge reports and the outreach is to ensure patient has an understanding of their discharge instructions.

## 2025-03-02 ENCOUNTER — NURSE TRIAGE (OUTPATIENT)
Dept: NURSING | Facility: CLINIC | Age: 70
End: 2025-03-02
Payer: COMMERCIAL

## 2025-03-02 NOTE — TELEPHONE ENCOUNTER
"Nurse Triage SBAR    Is this a 2nd Level Triage? NO    Situation:  Breathing difficulty    Background: Pt's daughter Joy calling. She is not with pt at time of call and there is no consent on file. Jaennie reports pt \"in and out of consciousness, breathing not good\".     Assessment:  Incomplete triage, emergency advice given     Protocol Recommended Disposition:   Call  Now    Recommendation:  Advised Joy if pt having severe difficulty breathing or any other severe symptoms to call 911. Advised Joy unable to triage if not with pt however she should call 911 if pt having severe difficulty, otherwise call back when with pt.     Joy verbalizes understanding of advice, states she will call 911.     Does the patient meet one of the following criteria for ADS visit consideration? 16+ years old, with an [facility] PCP     TIP  Providers, please consider if this condition is appropriate for management at one of our Acute and Diagnostic Services sites.     If patient is a good candidate, please use dotphrase <dot>triageresponse and select Refer to ADS to document.    Reason for Disposition   SEVERE difficulty breathing (e.g., struggling for each breath, speaks in single words)    Additional Information   [1] Caller is not with the adult (patient) AND [2] reporting urgent symptoms    Protocols used: Breathing Difficulty-A-AH, Information Only Call-A-AH    "

## 2025-03-02 NOTE — TELEPHONE ENCOUNTER
Nurse Triage SBAR    Is this a 2nd Level Triage? NO    Situation: Confusion-Delirium.     Background: Patient's daughter, Joy, is calling because she is confused about her mother's increased confusion. Patient returned home from the hospital 2/25/2025 with a kent catheter, daughter reports that her mother is afebrile.     Assessment: The patient's daughter is reporting blood clots and darker, rahel color to the urine in the kent catheter bag; also notes increased confusion with visual hallucinations.     Protocol Recommended Disposition:   Call  Now    Recommendation: The caller was advised to call  Now but the patient was speaking in the background and is adamantly refusing to call for help, the patient's daughter was still undecided on plan tonight. Care advice and call back information were provided.      Patient's Daughter is Requesting a call to follow-up regarding ongoing care and increased needs post-hospitalization, Joy Hartman (Daughter) can be reached at: [phone].     Does the patient meet one of the following criteria for ADS visit consideration? No.      Reason for Disposition   [1] Difficult to awaken or acting confused (e.g., disoriented, slurred speech) AND [2] present now AND [3] new-onset   Seeing, hearing, or feeling things that are not there (i.e., visual, auditory, or tactile hallucinations)    Additional Information   Negative: [1] Difficult to awaken or acting confused (e.g., disoriented, slurred speech) AND [2] present now AND [3] diabetes mellitus   Negative: Shock suspected (e.g., cold/pale/clammy skin, too weak to stand, low BP, rapid pulse)   Negative: Sounds like a life-threatening emergency to the triager    Protocols used: Confusion - Delirium-A-AH, Urine - Blood In-A-AH

## 2025-03-03 ENCOUNTER — TELEPHONE (OUTPATIENT)
Dept: ONCOLOGY | Facility: CLINIC | Age: 70
End: 2025-03-03
Payer: COMMERCIAL

## 2025-03-03 NOTE — TELEPHONE ENCOUNTER
"Oncology Nurse Triage    Situation:   Alis reporting the following symptoms: severe weakness, shortness of breath, low grade fever     Background:   Treating Provider:   Dr Perez     Date of last office visit: 1/14/2025 with Dr Perez     Recent Treatments:on surveillance for Uterine serous carcinoma; was not able to tolerate chemotherapy     Assessment:     Writer called pt's daughter.  Pt was just discharged from the hospital on 2/27/2025.  Pt's daughter states that pt has not been doing well since being discharged. States that she has been \"delirious\" at times and this seems to be increasing. At times she is aware and alert and at other times she is reaching out to people who are not there. She has not been eating hardly any food at all since discharge. Maybe a few bites of soup and broth, but no other solid foods. Has only been drinking about 16 ounces of fluid daily. States that pt is very weak and cannot get out of bed. They have tried to get her out of bed to a chair but pt needs to be in bed all of the time.   Pt's breathing is \"not good\" per pt's daughter. Pt has been wheezing and has been short of breath at times.   Her urine from suprapubic catheter is cloudy, dark, and has an odor. Also states that pt was running a low grade fever yesterday around  degrees F. She has not checked it today.   They called an ambulance to come to evaluate pt last night, but pt refused to go to ED.     Recommendations:     Advised that pt needs to be seen in ED for evaluation today, and pt's daughter agrees.  She will call 911 to have pt transported to ED at [facility], located within [facility] in [place].  Patient's daughter verbalized understanding and agreement with plan.    Zohra Peterson RN on 3/3/2025 at 11:36 AM          "

## 2025-03-03 NOTE — TELEPHONE ENCOUNTER
----- Message from Nicholas JOHNSTON sent at 3/3/2025 10:34 AM CST -----  Regarding: Call from daughter - symptom concerns  Hi,    This patient's daughter, Joy, left a message this morning stating patient is not doing very well.  She mentioned the patient is not eating, and they are concerned about possible infection.  No other details were provided.      Please call to further assess.  Joy left a callback number of [phone].    Thanks,  Nicholas

## 2025-03-04 ENCOUNTER — HOSPITAL ENCOUNTER (INPATIENT)
Facility: CLINIC | Age: 70
End: 2025-03-04
Attending: EMERGENCY MEDICINE | Admitting: INTERNAL MEDICINE
Payer: MEDICAID

## 2025-03-04 ENCOUNTER — APPOINTMENT (OUTPATIENT)
Dept: CT IMAGING | Facility: CLINIC | Age: 70
End: 2025-03-04
Attending: EMERGENCY MEDICINE
Payer: MEDICAID

## 2025-03-04 DIAGNOSIS — Z93.59 SUPRAPUBIC CATHETER (H): ICD-10-CM

## 2025-03-04 DIAGNOSIS — N39.0 ACUTE UTI: ICD-10-CM

## 2025-03-04 DIAGNOSIS — L89.159 PRESSURE INJURY OF SKIN OF SACRAL REGION, UNSPECIFIED INJURY STAGE: Primary | ICD-10-CM

## 2025-03-04 DIAGNOSIS — R10.30 LOWER ABDOMINAL PAIN: ICD-10-CM

## 2025-03-04 DIAGNOSIS — R41.0 DELIRIUM: ICD-10-CM

## 2025-03-04 DIAGNOSIS — Z87.440 HISTORY OF URINARY TRACT INFECTION: ICD-10-CM

## 2025-03-04 LAB
ALBUMIN SERPL BCG-MCNC: 3 G/DL (ref 3.5–5.2)
ALBUMIN UR-MCNC: 50 MG/DL
ALP SERPL-CCNC: 106 U/L (ref 40–150)
ALT SERPL W P-5'-P-CCNC: 19 U/L (ref 0–50)
ANION GAP SERPL CALCULATED.3IONS-SCNC: 10 MMOL/L (ref 7–15)
APPEARANCE UR: ABNORMAL
AST SERPL W P-5'-P-CCNC: 20 U/L (ref 0–45)
BACTERIA #/AREA URNS HPF: ABNORMAL /HPF
BASOPHILS # BLD AUTO: 0 10E3/UL (ref 0–0.2)
BASOPHILS NFR BLD AUTO: 0 %
BILIRUB SERPL-MCNC: 0.7 MG/DL
BILIRUB UR QL STRIP: NEGATIVE
BUN SERPL-MCNC: 29.1 MG/DL (ref 8–23)
CALCIUM SERPL-MCNC: 8.8 MG/DL (ref 8.8–10.4)
CHLORIDE SERPL-SCNC: 105 MMOL/L (ref 98–107)
COLOR UR AUTO: YELLOW
CREAT SERPL-MCNC: 0.9 MG/DL (ref 0.51–0.95)
EGFRCR SERPLBLD CKD-EPI 2021: 68 ML/MIN/1.73M2
EOSINOPHIL # BLD AUTO: 0 10E3/UL (ref 0–0.7)
EOSINOPHIL NFR BLD AUTO: 0 %
ERYTHROCYTE [DISTWIDTH] IN BLOOD BY AUTOMATED COUNT: 23.7 % (ref 10–15)
GLUCOSE SERPL-MCNC: 98 MG/DL (ref 70–99)
GLUCOSE UR STRIP-MCNC: NEGATIVE MG/DL
HCO3 SERPL-SCNC: 24 MMOL/L (ref 22–29)
HCT VFR BLD AUTO: 26 % (ref 35–47)
HGB BLD-MCNC: 8.6 G/DL (ref 11.7–15.7)
HGB UR QL STRIP: ABNORMAL
HYALINE CASTS: 2 /LPF
IMM GRANULOCYTES # BLD: 0.1 10E3/UL
IMM GRANULOCYTES NFR BLD: 1 %
KETONES UR STRIP-MCNC: NEGATIVE MG/DL
LEUKOCYTE ESTERASE UR QL STRIP: ABNORMAL
LYMPHOCYTES # BLD AUTO: 1.5 10E3/UL (ref 0.8–5.3)
LYMPHOCYTES NFR BLD AUTO: 19 %
MCH RBC QN AUTO: 30.4 PG (ref 26.5–33)
MCHC RBC AUTO-ENTMCNC: 33.1 G/DL (ref 31.5–36.5)
MCV RBC AUTO: 92 FL (ref 78–100)
MONOCYTES # BLD AUTO: 0.9 10E3/UL (ref 0–1.3)
MONOCYTES NFR BLD AUTO: 12 %
MUCOUS THREADS #/AREA URNS LPF: PRESENT /LPF
NEUTROPHILS # BLD AUTO: 5.3 10E3/UL (ref 1.6–8.3)
NEUTROPHILS NFR BLD AUTO: 68 %
NITRATE UR QL: NEGATIVE
NRBC # BLD AUTO: 0 10E3/UL
NRBC BLD AUTO-RTO: 0 /100
PH UR STRIP: 5.5 [PH] (ref 5–7)
PLATELET # BLD AUTO: 150 10E3/UL (ref 150–450)
POTASSIUM SERPL-SCNC: 3.5 MMOL/L (ref 3.4–5.3)
PROT SERPL-MCNC: 5.3 G/DL (ref 6.4–8.3)
RBC # BLD AUTO: 2.83 10E6/UL (ref 3.8–5.2)
RBC URINE: >182 /HPF
SODIUM SERPL-SCNC: 139 MMOL/L (ref 135–145)
SP GR UR STRIP: 1.01 (ref 1–1.03)
SQUAMOUS EPITHELIAL: 2 /HPF
UROBILINOGEN UR STRIP-MCNC: NORMAL MG/DL
WBC # BLD AUTO: 7.8 10E3/UL (ref 4–11)
WBC CLUMPS #/AREA URNS HPF: PRESENT /HPF
WBC URINE: >182 /HPF

## 2025-03-04 PROCEDURE — 99285 EMERGENCY DEPT VISIT HI MDM: CPT | Performed by: EMERGENCY MEDICINE

## 2025-03-04 PROCEDURE — 80053 COMPREHEN METABOLIC PANEL: CPT | Performed by: EMERGENCY MEDICINE

## 2025-03-04 PROCEDURE — 74176 CT ABD & PELVIS W/O CONTRAST: CPT

## 2025-03-04 PROCEDURE — 96365 THER/PROPH/DIAG IV INF INIT: CPT | Performed by: EMERGENCY MEDICINE

## 2025-03-04 PROCEDURE — 87086 URINE CULTURE/COLONY COUNT: CPT | Performed by: INTERNAL MEDICINE

## 2025-03-04 PROCEDURE — 250N000011 HC RX IP 250 OP 636: Performed by: INTERNAL MEDICINE

## 2025-03-04 PROCEDURE — 120N000002 HC R&B MED SURG/OB UMMC

## 2025-03-04 PROCEDURE — 85014 HEMATOCRIT: CPT | Performed by: EMERGENCY MEDICINE

## 2025-03-04 PROCEDURE — 258N000003 HC RX IP 258 OP 636: Performed by: EMERGENCY MEDICINE

## 2025-03-04 PROCEDURE — 81001 URINALYSIS AUTO W/SCOPE: CPT | Performed by: INTERNAL MEDICINE

## 2025-03-04 PROCEDURE — 250N000013 HC RX MED GY IP 250 OP 250 PS 637: Performed by: INTERNAL MEDICINE

## 2025-03-04 PROCEDURE — 74176 CT ABD & PELVIS W/O CONTRAST: CPT | Mod: 26 | Performed by: RADIOLOGY

## 2025-03-04 PROCEDURE — 36415 COLL VENOUS BLD VENIPUNCTURE: CPT | Performed by: EMERGENCY MEDICINE

## 2025-03-04 PROCEDURE — 99285 EMERGENCY DEPT VISIT HI MDM: CPT | Mod: 25 | Performed by: EMERGENCY MEDICINE

## 2025-03-04 PROCEDURE — 96361 HYDRATE IV INFUSION ADD-ON: CPT | Performed by: EMERGENCY MEDICINE

## 2025-03-04 PROCEDURE — 87040 BLOOD CULTURE FOR BACTERIA: CPT | Performed by: EMERGENCY MEDICINE

## 2025-03-04 RX ORDER — OXYCODONE HYDROCHLORIDE 10 MG/1
20 TABLET ORAL ONCE
Status: COMPLETED | OUTPATIENT
Start: 2025-03-04 | End: 2025-03-04

## 2025-03-04 RX ORDER — ACETAMINOPHEN 325 MG/1
975 TABLET ORAL ONCE
Status: COMPLETED | OUTPATIENT
Start: 2025-03-04 | End: 2025-03-04

## 2025-03-04 RX ORDER — AMPICILLIN AND SULBACTAM 2; 1 G/1; G/1
3 INJECTION, POWDER, FOR SOLUTION INTRAMUSCULAR; INTRAVENOUS ONCE
Status: COMPLETED | OUTPATIENT
Start: 2025-03-04 | End: 2025-03-05

## 2025-03-04 RX ADMIN — AMPICILLIN SODIUM AND SULBACTAM SODIUM 3 G: 2; 1 INJECTION, POWDER, FOR SOLUTION INTRAMUSCULAR; INTRAVENOUS at 22:07

## 2025-03-04 RX ADMIN — OXYCODONE HYDROCHLORIDE 20 MG: 10 TABLET ORAL at 18:27

## 2025-03-04 RX ADMIN — ACETAMINOPHEN 975 MG: 325 TABLET, FILM COATED ORAL at 21:11

## 2025-03-04 RX ADMIN — SODIUM CHLORIDE 1000 ML: 0.9 INJECTION, SOLUTION INTRAVENOUS at 17:29

## 2025-03-04 ASSESSMENT — ACTIVITIES OF DAILY LIVING (ADL)
ADLS_ACUITY_SCORE: 67
ADLS_ACUITY_SCORE: 61
ADLS_ACUITY_SCORE: 67
ADLS_ACUITY_SCORE: 61
ADLS_ACUITY_SCORE: 67
ADLS_ACUITY_SCORE: 67
ADLS_ACUITY_SCORE: 61
ADLS_ACUITY_SCORE: 67
ADLS_ACUITY_SCORE: 67
ADLS_ACUITY_SCORE: 61

## 2025-03-04 NOTE — ED NOTES
Bed: ED19  Expected date: 3/4/25  Expected time: 1:11 PM  Means of arrival: Ambulance  Comments:  [address]  69F  Bed bound, possible UTI

## 2025-03-04 NOTE — ED PROVIDER NOTES
[place] EMERGENCY DEPARTMENT ([facility])    3/04/25       ED PROVIDER NOTE   History     Chief Complaint   Patient presents with    Rule out Urinary Tract Infection     The history is provided by medical records, the patient and the EMS personnel.     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  Patient is a poor historian herself.  She states that she was feeling sick and her caretakers wanted her to be seen.  Per review of her chart there is a phone call from patient's daughter yesterday stating that since she was discharged on 2/27 she has not been doing well with reported worsening altered mental status, decreased appetite, increasing weakness leading her to be bedbound, wheezing, and shortness of breath.  Her urine output has also been reportedly cloudy, dark, malodorous and she has been running a low-grade fever for the past 2 days.  EMS was called to the home 2 nights ago, but patient refused to go to the ED.    Spoke to the Daughter on the phone-Main concern is that she is not eating at home since discharge. She has been intermittent confused. She is more confused and crying at night time. Main concern is want to make sure that she does not have a UTI.     Past Medical History  Past Medical History:   Diagnosis Date    Atrial fibrillation (H)     Depression     Hypertension     Malignant neoplasm of endocervix (H)     Tx with radiation    Near syncope 11/7/2024    Orthopnea 9/21/2024    Other chronic pain     Low back    Schizophrenia (H)     Urinary incontinence      Past Surgical History:   Procedure Laterality Date    ABDOMINOPLASTY      BIOPSY CERVICAL, LOCAL EXCISION,  SINGLE/MULTIPLE  10/26/2011    Procedure:BIOPSY CERVICAL, LOCAL EXCISION, SINGLE/MULTIPLE; EUA, cervical biopsies; Surgeon:BETTY TINEO; Location:[facility] OR    BIOPSY VAGINAL N/A 06/08/2021    Procedure: BIOPSY, VAGINA;  Surgeon: Dany Perez MD;  Location: [facility] OR    CYSTOSCOPY N/A 05/17/2024    Procedure: Cystoscopy;  Surgeon: Dany Perez MD;  Location: [facility] OR    CYSTOSTOMY, INSERT TUBE SUPRAPUBIC, COMBINED  07/12/2024    Procedure: Insertion of supra-pubic catheter;  Surgeon: Dany Perez MD;  Location: [facility]    DILATION AND CURETTAGE, WITH ULTRASOUND GUIDANCE N/A 05/17/2024    Procedure: Dilation and curettage of the uterus with drainage of uterine fluid under ultrasound guidance, lysis of vaginal adhesions;  Surgeon: Dany Perez MD;  Location: [facility] OR    EXAM UNDER ANESTHESIA PELVIC N/A 03/12/2020    Procedure: Exam under anesthsia, vaginal biopsies, possible CO2 laser of the vagina;  Surgeon: Lilliam Roy MD;  [facility]: [facility] OR    GI SURGERY  2004    gastric bypass    HYSTERECTOMY TOTAL ABDOMINAL, BILATERAL SALPINGO-OOPHORECTOMY, COMBINED Bilateral 07/12/2024    Procedure: Diagnostic laparoscopy converted to exploratory laparotomy, total abdominal hysterectomy, bilateral salpingo-oophorectomy, lysis of adhesions >60 min;  Surgeon: Dany Perez MD;  Location: [facility] OR    INSERT PORT VASCULAR ACCESS N/A 08/26/2024    Procedure: Insert port vascular access;  Surgeon: Avery Gregory MD;  Location: [facility] OR    IR CHEST PORT PLACEMENT > 5 YRS OF AGE  08/26/2024    IR FOLLOW UP VISIT OUTPATIENT  1/7/2025    IR NEPHROSTOMY TUBE PLACEMENT LEFT  11/29/2024    LASER CO2 VAGINA N/A 03/02/2015    Procedure: LASER CO2 VAGINA;  Surgeon: Mariela Abdalla MD;  Location: [facility] OR    LASER CO2 VAGINA N/A 05/24/2019    Procedure: Exam under anesthesia, vaginal biopsies, CO2 laser of the vagina;  Surgeon: Lilliam Roy MD;  Location: [facility] OR    LASER CO2 VAGINA N/A 06/08/2021    Procedure: Exam  "under anesthesia, laser ablation of the upper vagina;  Surgeon: Dany Perez MD;  Location: [facility] OR    PICC DOUBLE LUMEN PLACEMENT Left 11/28/2024    left basilic 5 fr dl power picc 44 cm    REPAIR BLADDER N/A 07/12/2024    Procedure: Repair bladder;  Surgeon: Dany Perez MD;  Location: [facility] OR     acetaminophen (TYLENOL) 325 MG tablet  albuterol (PROAIR HFA/PROVENTIL HFA/VENTOLIN HFA) 108 (90 Base) MCG/ACT inhaler  calcium Citrate-vitamin D 500-400 MG-UNIT CHEW  cyanocobalamin (CYANOCOBALAMIN) 1000 MCG/ML injection  diclofenac (VOLTAREN) 1 % topical gel  ferrous sulfate (CASSANDRA-IN-SOL) 75 (15 FE) MG/ML oral drops  hydrocortisone 2.5 % cream  lidocaine (XYLOCAINE) 5 % external ointment  magic mouthwash suspension, diphenhydrAMINE, lidocaine, aluminum-magnesium & simethicone, (FIRST-MOUTHWASH BLM) compounding kit  melatonin 1 MG TABS tablet  melatonin 3 MG tablet  morphine 0.5 % in solosite gel  naloxone (NARCAN) 4 MG/0.1ML nasal spray  ondansetron (ZOFRAN) 8 MG tablet  oxyCODONE IR (ROXICODONE) 20 MG TABS immediate release tablet  phenol (CHLORASEPTIC) 1.4 % spray  polyethylene glycol (MIRALAX) 17 GM/Dose powder  prochlorperazine (COMPAZINE) 5 MG tablet  senna-docusate (SENOKOT-S/PERICOLACE) 8.6-50 MG tablet  Skin Protectants, Misc. (INTERDRY 10\"X36\") SHEE  triamcinolone (KENALOG) 0.1 % external ointment      Allergies   Allergen Reactions    Ibuprofen Nausea and Vomiting    Shrimp     Sulfa Antibiotics Rash     Patient started on bactrim 7/24/24 tolerating medication without rash on 7/25      Family History  Family History   Problem Relation Age of Onset    Heart Disease Father     Heart Failure Father     No Known Problems Brother     No Known Problems Brother     No Known Problems Brother     Pancreatitis Brother     Hypertension Sister     Peripheral Vascular Disease Sister     No Known Problems Sister     No Known Problems Son     No Known Problems Daughter      Social History   Social History     Tobacco Use "    Smoking status: Never    Smokeless tobacco: Never   Substance Use Topics    Alcohol use: Not Currently    Drug use: No      A complete review of systems was attempted but limited due to patient poor historian, no caregiver present.    Physical Exam   BP: (!) 143/85  Pulse: 95  Temp: 97.6  F (36.4  C)  Resp: 18  SpO2: 100 %  Physical Exam  Constitutional:       General: She is not in acute distress.     Appearance: She is ill-appearing (chronically ill appearing).      Comments: Contractures in neck; able to answer questions fully.  Moving all extremities.   HENT:      Head: Normocephalic and atraumatic.   Cardiovascular:      Rate and Rhythm: Normal rate and regular rhythm.   Pulmonary:      Effort: Pulmonary effort is normal. No respiratory distress.      Breath sounds: Wheezing present.   Abdominal:      General: There is no distension.      Tenderness: There is no abdominal tenderness. There is no guarding.   Musculoskeletal:      Right lower leg: No edema.      Left lower leg: No edema.   Neurological:      Mental Status: She is oriented to person, place, and time.   Psychiatric:         Mood and Affect: Mood normal.         Behavior: Behavior normal.         Thought Content: Thought content normal.           ED Course, Procedures, & Data      Procedures         Results for orders placed or performed during the hospital encounter of 03/04/25   Comprehensive metabolic panel     Status: Abnormal   Result Value Ref Range    Sodium 139 135 - 145 mmol/L    Potassium 3.5 3.4 - 5.3 mmol/L    Carbon Dioxide (CO2) 24 22 - 29 mmol/L    Anion Gap 10 7 - 15 mmol/L    Urea Nitrogen 29.1 (H) 8.0 - 23.0 mg/dL    Creatinine 0.90 0.51 - 0.95 mg/dL    GFR Estimate 68 >60 mL/min/1.73m2    Calcium 8.8 8.8 - 10.4 mg/dL    Chloride 105 98 - 107 mmol/L    Glucose 98 70 - 99 mg/dL    Alkaline Phosphatase 106 40 - 150 U/L    AST 20 0 - 45 U/L    ALT 19 0 - 50 U/L    Protein Total 5.3 (L) 6.4 - 8.3 g/dL    Albumin 3.0 (L) 3.5 - 5.2  g/dL    Bilirubin Total 0.7 <=1.2 mg/dL   CBC with platelets and differential     Status: Abnormal   Result Value Ref Range    WBC Count 7.8 4.0 - 11.0 10e3/uL    RBC Count 2.83 (L) 3.80 - 5.20 10e6/uL    Hemoglobin 8.6 (L) 11.7 - 15.7 g/dL    Hematocrit 26.0 (L) 35.0 - 47.0 %    MCV 92 78 - 100 fL    MCH 30.4 26.5 - 33.0 pg    MCHC 33.1 31.5 - 36.5 g/dL    RDW 23.7 (H) 10.0 - 15.0 %    Platelet Count 150 150 - 450 10e3/uL    % Neutrophils 68 %    % Lymphocytes 19 %    % Monocytes 12 %    % Eosinophils 0 %    % Basophils 0 %    % Immature Granulocytes 1 %    NRBCs per 100 WBC 0 <1 /100    Absolute Neutrophils 5.3 1.6 - 8.3 10e3/uL    Absolute Lymphocytes 1.5 0.8 - 5.3 10e3/uL    Absolute Monocytes 0.9 0.0 - 1.3 10e3/uL    Absolute Eosinophils 0.0 0.0 - 0.7 10e3/uL    Absolute Basophils 0.0 0.0 - 0.2 10e3/uL    Absolute Immature Granulocytes 0.1 <=0.4 10e3/uL    Absolute NRBCs 0.0 10e3/uL   CBC with platelets differential     Status: Abnormal    Narrative    The following orders were created for panel order CBC with platelets differential.  Procedure                               Abnormality         Status                     ---------                               -----------         ------                     CBC with platelets and d...[[id number]]  Abnormal            Final result                 Please view results for these tests on the individual orders.     Medications - No data to display      Results for orders placed or performed during the hospital encounter of 03/04/25   Comprehensive metabolic panel     Status: Abnormal   Result Value Ref Range    Sodium 139 135 - 145 mmol/L    Potassium 3.5 3.4 - 5.3 mmol/L    Carbon Dioxide (CO2) 24 22 - 29 mmol/L    Anion Gap 10 7 - 15 mmol/L    Urea Nitrogen 29.1 (H) 8.0 - 23.0 mg/dL    Creatinine 0.90 0.51 - 0.95 mg/dL    GFR Estimate 68 >60 mL/min/1.73m2    Calcium 8.8 8.8 - 10.4 mg/dL    Chloride 105 98 - 107 mmol/L    Glucose 98 70 - 99 mg/dL    Alkaline  Phosphatase 106 40 - 150 U/L    AST 20 0 - 45 U/L    ALT 19 0 - 50 U/L    Protein Total 5.3 (L) 6.4 - 8.3 g/dL    Albumin 3.0 (L) 3.5 - 5.2 g/dL    Bilirubin Total 0.7 <=1.2 mg/dL   CBC with platelets and differential     Status: Abnormal   Result Value Ref Range    WBC Count 7.8 4.0 - 11.0 10e3/uL    RBC Count 2.83 (L) 3.80 - 5.20 10e6/uL    Hemoglobin 8.6 (L) 11.7 - 15.7 g/dL    Hematocrit 26.0 (L) 35.0 - 47.0 %    MCV 92 78 - 100 fL    MCH 30.4 26.5 - 33.0 pg    MCHC 33.1 31.5 - 36.5 g/dL    RDW 23.7 (H) 10.0 - 15.0 %    Platelet Count 150 150 - 450 10e3/uL    % Neutrophils 68 %    % Lymphocytes 19 %    % Monocytes 12 %    % Eosinophils 0 %    % Basophils 0 %    % Immature Granulocytes 1 %    NRBCs per 100 WBC 0 <1 /100    Absolute Neutrophils 5.3 1.6 - 8.3 10e3/uL    Absolute Lymphocytes 1.5 0.8 - 5.3 10e3/uL    Absolute Monocytes 0.9 0.0 - 1.3 10e3/uL    Absolute Eosinophils 0.0 0.0 - 0.7 10e3/uL    Absolute Basophils 0.0 0.0 - 0.2 10e3/uL    Absolute Immature Granulocytes 0.1 <=0.4 10e3/uL    Absolute NRBCs 0.0 10e3/uL   CBC with platelets differential     Status: Abnormal    Narrative    The following orders were created for panel order CBC with platelets differential.  Procedure                               Abnormality         Status                     ---------                               -----------         ------                     CBC with platelets and d...[[id number]]  Abnormal            Final result                 Please view results for these tests on the individual orders.          No results found for any visits on 03/04/25.  Medications - No data to display  Labs Ordered and Resulted from Time of ED Arrival to Time of ED Departure - No data to display  No orders to display          Critical care was not performed.     Medical Decision Making  The patient's presentation was of high complexity (an acute health issue posing potential threat to life or bodily function).    The patient's  evaluation involved:  review of external note(s) from 3+ sources (see separate area of note for details)  review of 3+ test result(s) ordered prior to this encounter (see separate area of note for details)  ordering and/or review of 3+ test(s) in this encounter (see separate area of note for details)    The patient's management necessitated moderate risk (prescription drug management including medications given in the ED) and further care after sign-out to Dr. Abebe (see their note for further management).    Assessment & Plan    Is a 69-year-old female with a history of chronic illness secondary to previous gynecologic colon cancer as well as prolonged hospitalization for hyponatremia and chronic kidney disease who presents back to the ER due to family saying that she is not eating and drinking well at home.  Family is concerned that she has not had a bowel movement in the past 3 days and she has been more confused.  Patient here says that her family was concerned that she had a urinary tract infection.  Says that she is feeling normal.  I did have a long discussion with the patient's daughter.  The daughter is concerned that she might have a UTI which might be causing her confusion.  She does have a known indwelling Shahid catheter.  Patient here has stable vital signs.  Oxygenation is 100% on room air.  Patient CBC and BMP are at baseline.  Plan will be to obtain a UA and a CT abdomen and pelvis.  If these are both negative family is able to take the patient back home.  There is concern for infection or any other abdominal pathology patient should be admitted to internal medicine for further care.    I have reviewed the nursing notes. I have reviewed the findings, diagnosis, plan and need for follow up with the patient.    New Prescriptions    No medications on file       Final diagnoses:   History of urinary tract infection   Lower abdominal pain   I, Yessi Currie, am serving as a trained medical scribe to  document services personally performed by Becky Mathew MD, based on the provider's statements to me.     I, eBcky Mathew MD, was physically present and have reviewed and verified the accuracy of this note documented by Yessi Currie.      Becky Mathew MD  [facility] EMERGENCY DEPARTMENT  3/4/2025     Becky Mathew MD  03/04/25 [id number]

## 2025-03-04 NOTE — ED TRIAGE NOTES
Triage Assessment & Note:    There were no vitals taken for this visit.      Patient presents with: Bed bound pt BIBA ([address]) from home with possible UTI. Pt with kent with cloudy output.     Home Treatments/Remedies: Home medications    Febrile / Afebrile: afebrile    Duration of C/o: n/a    Mar Muñoz RN  March 4, 2025

## 2025-03-05 LAB
ALBUMIN SERPL BCG-MCNC: 2.8 G/DL (ref 3.5–5.2)
ALP SERPL-CCNC: 105 U/L (ref 40–150)
ALT SERPL W P-5'-P-CCNC: 18 U/L (ref 0–50)
ANION GAP SERPL CALCULATED.3IONS-SCNC: 13 MMOL/L (ref 7–15)
AST SERPL W P-5'-P-CCNC: 21 U/L (ref 0–45)
BILIRUB SERPL-MCNC: 0.8 MG/DL
BUN SERPL-MCNC: 25.5 MG/DL (ref 8–23)
CALCIUM SERPL-MCNC: 8 MG/DL (ref 8.8–10.4)
CHLORIDE SERPL-SCNC: 108 MMOL/L (ref 98–107)
CREAT SERPL-MCNC: 0.81 MG/DL (ref 0.51–0.95)
EGFRCR SERPLBLD CKD-EPI 2021: 77 ML/MIN/1.73M2
ERYTHROCYTE [DISTWIDTH] IN BLOOD BY AUTOMATED COUNT: 23.7 % (ref 10–15)
GLUCOSE SERPL-MCNC: 82 MG/DL (ref 70–99)
HCO3 SERPL-SCNC: 19 MMOL/L (ref 22–29)
HCT VFR BLD AUTO: 25.1 % (ref 35–47)
HGB BLD-MCNC: 8.2 G/DL (ref 11.7–15.7)
MCH RBC QN AUTO: 29.6 PG (ref 26.5–33)
MCHC RBC AUTO-ENTMCNC: 32.7 G/DL (ref 31.5–36.5)
MCV RBC AUTO: 91 FL (ref 78–100)
PLATELET # BLD AUTO: 140 10E3/UL (ref 150–450)
POTASSIUM SERPL-SCNC: 3.5 MMOL/L (ref 3.4–5.3)
PROT SERPL-MCNC: 4.9 G/DL (ref 6.4–8.3)
RBC # BLD AUTO: 2.77 10E6/UL (ref 3.8–5.2)
SODIUM SERPL-SCNC: 140 MMOL/L (ref 135–145)
WBC # BLD AUTO: 9.7 10E3/UL (ref 4–11)

## 2025-03-05 PROCEDURE — 99223 1ST HOSP IP/OBS HIGH 75: CPT | Performed by: INTERNAL MEDICINE

## 2025-03-05 PROCEDURE — 250N000011 HC RX IP 250 OP 636

## 2025-03-05 PROCEDURE — 258N000003 HC RX IP 258 OP 636: Performed by: INTERNAL MEDICINE

## 2025-03-05 PROCEDURE — 36415 COLL VENOUS BLD VENIPUNCTURE: CPT | Performed by: INTERNAL MEDICINE

## 2025-03-05 PROCEDURE — 85027 COMPLETE CBC AUTOMATED: CPT | Performed by: INTERNAL MEDICINE

## 2025-03-05 PROCEDURE — 250N000013 HC RX MED GY IP 250 OP 250 PS 637: Performed by: INTERNAL MEDICINE

## 2025-03-05 PROCEDURE — 250N000013 HC RX MED GY IP 250 OP 250 PS 637: Performed by: STUDENT IN AN ORGANIZED HEALTH CARE EDUCATION/TRAINING PROGRAM

## 2025-03-05 PROCEDURE — 82040 ASSAY OF SERUM ALBUMIN: CPT | Performed by: INTERNAL MEDICINE

## 2025-03-05 PROCEDURE — 120N000002 HC R&B MED SURG/OB UMMC

## 2025-03-05 PROCEDURE — 250N000011 HC RX IP 250 OP 636: Performed by: INTERNAL MEDICINE

## 2025-03-05 PROCEDURE — 258N000003 HC RX IP 258 OP 636

## 2025-03-05 RX ORDER — NALOXONE HYDROCHLORIDE 0.4 MG/ML
0.4 INJECTION, SOLUTION INTRAMUSCULAR; INTRAVENOUS; SUBCUTANEOUS
Status: DISCONTINUED | OUTPATIENT
Start: 2025-03-05 | End: 2025-04-04 | Stop reason: HOSPADM

## 2025-03-05 RX ORDER — HYDRALAZINE HYDROCHLORIDE 10 MG/1
10 TABLET, FILM COATED ORAL EVERY 4 HOURS PRN
Status: DISCONTINUED | OUTPATIENT
Start: 2025-03-05 | End: 2025-03-14

## 2025-03-05 RX ORDER — ONDANSETRON 2 MG/ML
4 INJECTION INTRAMUSCULAR; INTRAVENOUS EVERY 6 HOURS PRN
Status: DISCONTINUED | OUTPATIENT
Start: 2025-03-05 | End: 2025-04-04 | Stop reason: HOSPADM

## 2025-03-05 RX ORDER — AMOXICILLIN 250 MG
1 CAPSULE ORAL 2 TIMES DAILY PRN
Status: DISCONTINUED | OUTPATIENT
Start: 2025-03-05 | End: 2025-03-21

## 2025-03-05 RX ORDER — ALBUTEROL SULFATE 90 UG/1
1-2 INHALANT RESPIRATORY (INHALATION) EVERY 4 HOURS PRN
Status: DISCONTINUED | OUTPATIENT
Start: 2025-03-05 | End: 2025-04-04 | Stop reason: HOSPADM

## 2025-03-05 RX ORDER — HYDRALAZINE HYDROCHLORIDE 20 MG/ML
10 INJECTION INTRAMUSCULAR; INTRAVENOUS EVERY 4 HOURS PRN
Status: DISCONTINUED | OUTPATIENT
Start: 2025-03-05 | End: 2025-03-14

## 2025-03-05 RX ORDER — AMPICILLIN AND SULBACTAM 2; 1 G/1; G/1
3 INJECTION, POWDER, FOR SOLUTION INTRAMUSCULAR; INTRAVENOUS EVERY 6 HOURS
Status: DISCONTINUED | OUTPATIENT
Start: 2025-03-05 | End: 2025-03-05

## 2025-03-05 RX ORDER — OXYCODONE HYDROCHLORIDE 10 MG/1
20 TABLET ORAL EVERY 6 HOURS PRN
Status: DISCONTINUED | OUTPATIENT
Start: 2025-03-05 | End: 2025-03-06

## 2025-03-05 RX ORDER — NALOXONE HYDROCHLORIDE 0.4 MG/ML
0.2 INJECTION, SOLUTION INTRAMUSCULAR; INTRAVENOUS; SUBCUTANEOUS
Status: DISCONTINUED | OUTPATIENT
Start: 2025-03-05 | End: 2025-04-04 | Stop reason: HOSPADM

## 2025-03-05 RX ORDER — ONDANSETRON 4 MG/1
4 TABLET, ORALLY DISINTEGRATING ORAL EVERY 6 HOURS PRN
Status: DISCONTINUED | OUTPATIENT
Start: 2025-03-05 | End: 2025-04-04 | Stop reason: HOSPADM

## 2025-03-05 RX ORDER — AMOXICILLIN 250 MG
2 CAPSULE ORAL 2 TIMES DAILY PRN
Status: DISCONTINUED | OUTPATIENT
Start: 2025-03-05 | End: 2025-03-21

## 2025-03-05 RX ORDER — DIPHENHYDRAMINE HYDROCHLORIDE AND LIDOCAINE HYDROCHLORIDE AND ALUMINUM HYDROXIDE AND MAGNESIUM HYDRO
10 KIT
Status: DISCONTINUED | OUTPATIENT
Start: 2025-03-05 | End: 2025-03-20

## 2025-03-05 RX ORDER — GUAIFENESIN/DEXTROMETHORPHAN 100-10MG/5
10 SYRUP ORAL EVERY 4 HOURS PRN
Status: DISCONTINUED | OUTPATIENT
Start: 2025-03-05 | End: 2025-03-21

## 2025-03-05 RX ORDER — ENOXAPARIN SODIUM 100 MG/ML
40 INJECTION SUBCUTANEOUS EVERY 24 HOURS
Status: DISCONTINUED | OUTPATIENT
Start: 2025-03-05 | End: 2025-03-05

## 2025-03-05 RX ORDER — ACETAMINOPHEN 325 MG/1
975 TABLET ORAL EVERY 8 HOURS
Status: DISCONTINUED | OUTPATIENT
Start: 2025-03-05 | End: 2025-03-16

## 2025-03-05 RX ORDER — ENOXAPARIN SODIUM 100 MG/ML
40 INJECTION SUBCUTANEOUS EVERY 24 HOURS
Status: DISCONTINUED | OUTPATIENT
Start: 2025-03-05 | End: 2025-03-18

## 2025-03-05 RX ORDER — LIDOCAINE 40 MG/G
CREAM TOPICAL
Status: DISCONTINUED | OUTPATIENT
Start: 2025-03-05 | End: 2025-04-04 | Stop reason: HOSPADM

## 2025-03-05 RX ORDER — SODIUM CHLORIDE, SODIUM LACTATE, POTASSIUM CHLORIDE, CALCIUM CHLORIDE 600; 310; 30; 20 MG/100ML; MG/100ML; MG/100ML; MG/100ML
INJECTION, SOLUTION INTRAVENOUS CONTINUOUS
Status: ACTIVE | OUTPATIENT
Start: 2025-03-05 | End: 2025-03-05

## 2025-03-05 RX ORDER — PROCHLORPERAZINE MALEATE 5 MG/1
5 TABLET ORAL EVERY 6 HOURS PRN
Status: DISCONTINUED | OUTPATIENT
Start: 2025-03-05 | End: 2025-03-21

## 2025-03-05 RX ORDER — BISACODYL 10 MG
10 SUPPOSITORY, RECTAL RECTAL DAILY PRN
Status: DISCONTINUED | OUTPATIENT
Start: 2025-03-05 | End: 2025-03-27

## 2025-03-05 RX ORDER — CALCIUM CARBONATE 500 MG/1
1000 TABLET, CHEWABLE ORAL 4 TIMES DAILY PRN
Status: DISCONTINUED | OUTPATIENT
Start: 2025-03-05 | End: 2025-03-21

## 2025-03-05 RX ORDER — PIPERACILLIN SODIUM, TAZOBACTAM SODIUM 4; .5 G/20ML; G/20ML
4.5 INJECTION, POWDER, LYOPHILIZED, FOR SOLUTION INTRAVENOUS EVERY 6 HOURS
Status: DISCONTINUED | OUTPATIENT
Start: 2025-03-05 | End: 2025-03-06

## 2025-03-05 RX ORDER — SODIUM CHLORIDE, SODIUM LACTATE, POTASSIUM CHLORIDE, CALCIUM CHLORIDE 600; 310; 30; 20 MG/100ML; MG/100ML; MG/100ML; MG/100ML
INJECTION, SOLUTION INTRAVENOUS
Status: COMPLETED
Start: 2025-03-05 | End: 2025-03-05

## 2025-03-05 RX ADMIN — SODIUM CHLORIDE, SODIUM LACTATE, POTASSIUM CHLORIDE, AND CALCIUM CHLORIDE 500 ML: .6; .31; .03; .02 INJECTION, SOLUTION INTRAVENOUS at 13:49

## 2025-03-05 RX ADMIN — PIPERACILLIN AND TAZOBACTAM 4.5 G: 4; .5 INJECTION, POWDER, FOR SOLUTION INTRAVENOUS at 21:05

## 2025-03-05 RX ADMIN — Medication 1 MG: at 21:05

## 2025-03-05 RX ADMIN — Medication 1 MG: at 00:40

## 2025-03-05 RX ADMIN — SODIUM CHLORIDE, SODIUM LACTATE, POTASSIUM CHLORIDE, CALCIUM CHLORIDE: 600; 310; 30; 20 INJECTION, SOLUTION INTRAVENOUS at 01:44

## 2025-03-05 RX ADMIN — OXYCODONE HYDROCHLORIDE 20 MG: 10 TABLET ORAL at 01:43

## 2025-03-05 RX ADMIN — OXYCODONE HYDROCHLORIDE 20 MG: 10 TABLET ORAL at 08:33

## 2025-03-05 RX ADMIN — SODIUM CHLORIDE, SODIUM LACTATE, POTASSIUM CHLORIDE, AND CALCIUM CHLORIDE: .6; .31; .03; .02 INJECTION, SOLUTION INTRAVENOUS at 01:44

## 2025-03-05 RX ADMIN — DIPHENHYDRAMINE HYDROCHLORIDE AND LIDOCAINE HYDROCHLORIDE AND ALUMINUM HYDROXIDE AND MAGNESIUM HYDRO 10 ML: KIT at 16:52

## 2025-03-05 RX ADMIN — PIPERACILLIN AND TAZOBACTAM 4.5 G: 4; .5 INJECTION, POWDER, FOR SOLUTION INTRAVENOUS at 15:24

## 2025-03-05 RX ADMIN — SODIUM CHLORIDE 500 ML: 0.9 INJECTION, SOLUTION INTRAVENOUS at 01:43

## 2025-03-05 RX ADMIN — PIPERACILLIN AND TAZOBACTAM 4.5 G: 4; .5 INJECTION, POWDER, FOR SOLUTION INTRAVENOUS at 09:34

## 2025-03-05 RX ADMIN — Medication 1 SPRAY: at 15:24

## 2025-03-05 RX ADMIN — AMPICILLIN SODIUM AND SULBACTAM SODIUM 3 G: 2; 1 INJECTION, POWDER, FOR SOLUTION INTRAMUSCULAR; INTRAVENOUS at 04:48

## 2025-03-05 RX ADMIN — ACETAMINOPHEN 975 MG: 325 TABLET, FILM COATED ORAL at 15:21

## 2025-03-05 RX ADMIN — ENOXAPARIN SODIUM 40 MG: 40 INJECTION SUBCUTANEOUS at 08:34

## 2025-03-05 RX ADMIN — OXYCODONE HYDROCHLORIDE 20 MG: 10 TABLET ORAL at 17:02

## 2025-03-05 RX ADMIN — ACETAMINOPHEN 975 MG: 325 TABLET, FILM COATED ORAL at 07:16

## 2025-03-05 ASSESSMENT — ACTIVITIES OF DAILY LIVING (ADL)
ADLS_ACUITY_SCORE: 71
ADLS_ACUITY_SCORE: 67
ADLS_ACUITY_SCORE: 71
ADLS_ACUITY_SCORE: 71
ADLS_ACUITY_SCORE: 67
ADLS_ACUITY_SCORE: 71
ADLS_ACUITY_SCORE: 67
ADLS_ACUITY_SCORE: 71

## 2025-03-05 NOTE — ED PROVIDER NOTES
Emergency Department I-PASS Sign-out      Illness Severity: Stable    Patient Summary:  Alis Hartman is a 69 year old female with history of cervical cancer s/p radiation, serous endometrial adenocarcinoma s/p SNEHA/BSO and cystotomy w/ suprapubic catheter placement (7/2024) with history of ESBL urosepsis and bacteremia, CKD 3, Kiko-en-Y gastric bypass, A-fib on apixaban, and HTN who presents to the ED via EMS from home for possible UTI.    ED Course/treatment plan: UA/ CT abd/pelvis    Clinical Impression:  (Z87.440) History of urinary tract infection    (R10.30) Lower abdominal pain      Edited by: Becky Mathew MD at 3/4/2025 7001    Action List:  -To do:  UA  CT abd/pelvis     Situational Awareness & Contingency Planning:  Code Status (Most recent):  Prior    Disposition:  likely do be admitted    Edited by: Becky Mathew MD at 3/4/2025 [id number]    Synthesis & Events after sign-out:  CT without acute pathologies, suprapubic cath misplaced for three hours-replaced with good urineoutput now, UA consistent with UTI, D/W Daughter-noted confusion and no food intake for five days all acute, D/W Medicine-admit.        Tyrese Abebe MD, MD   Emergency Medicine     Tyrese Abebe MD  03/04/25 [id number]

## 2025-03-05 NOTE — H&P
"HISTORY AND PHYSICAL       [facility]    Hospitalist Service, GOLD TEAM     Patient Name: Alis Hartman  YOB: 1955  Age/Gender: 69 year old female  Medical Record Number: [id number]    Date of Admission: 3/4/2025  Primary Care Physician: Maria Fernanda Eckert    HISTORY     Location seen: Emergency Room  Date of service:  3/5/2025    The patient's history is provided by chart review and unobtainable from patient due to confusion, difficulty understanding the patient and that the patient is a difficult historian and is compromised by altered mental status and unintelligible speech .    CHIEF COMPLAINT: the patient is vague when answering my question as to why she wanted to be medically evaluated    HISTORY OF PRESENT ILLNESS    The patient is a 69 year old female, with an extensive PMHx which is nicely documented in Dr. Khan and Mis's note from the ER.  Per their notes:  Ms. 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  The patient reportedly was feeling sick and her \"caretakers\" wanted her seen by a physician.  Apparently she has not been feeling well since her last discharge from the hospital with increased confusion, poor appetite and increased generalized weakness which now she is clearly confined to her bed because of this. Her urine output(UOP) has been foul smelling and lower amounts.  The patient refused to come to the ER 2 days ago.  The patient denies any other complaints but again has a difficult time offering a cogent history as to how she is feeling and what she's complaining of.  The remainder of the history of present illness is limited given the patient's altered " mental status    Location / Radiation: diffuse feeling unwell  Duration / Onset: days - to - weeks  Quality: generalized feeling poorly  Severity: moderate  Timing / Triggering event: nothing identifiable   What makes the symptoms worse: nothing identifiable   What makes the symptoms better: nothing identifiable  Context: complicated patient  Associated signs and Symptoms: otherwise negative  Additional information and other related symptoms or pertinent negatives: see other notes above  ---------------------------------------------------------------------------------------------------------------  The patient's presentation and clinical findings were reviewed and/or discussed with Dr. Abebe. In addition, I reviewed the prior care documentation notes (e.g. ER staff / nursing, clinic, referring hospital, consultants) for this hospitalization.  ---------------------------------------------------------------------------------------------------------------    REVIEW OF SYSTEMS  The remainder of the 14 system / comprehensive review of systems (ROS) was reviewed with the patient and/or representative providing the history and is negative and otherwise as documented in my HPI above.    Past Medical History:   Diagnosis Date    Atrial fibrillation (H)     Depression     Hypertension     Malignant neoplasm of endocervix (H)     Tx with radiation    Near syncope 11/7/2024    Orthopnea 9/21/2024    Other chronic pain     Low back    Schizophrenia (H)     Urinary incontinence      Past Surgical History:   Procedure Laterality Date    ABDOMINOPLASTY      BIOPSY CERVICAL, LOCAL EXCISION, SINGLE/MULTIPLE  10/26/2011    Procedure:BIOPSY CERVICAL, LOCAL EXCISION, SINGLE/MULTIPLE; EUA, cervical biopsies; Surgeon:BETTY TINEO; Location:[facility] OR    BIOPSY VAGINAL N/A 06/08/2021    Procedure: BIOPSY, VAGINA;  Surgeon: Dany Perez MD;  Location: [facility] OR    CYSTOSCOPY N/A 05/17/2024    Procedure: Cystoscopy;  Surgeon: Ana,  MD Dany;  Location: [facility] OR    CYSTOSTOMY, INSERT TUBE SUPRAPUBIC, COMBINED  07/12/2024    Procedure: Insertion of supra-pubic catheter;  Surgeon: Dany Perez MD;  Location: [facility]    DILATION AND CURETTAGE, WITH ULTRASOUND GUIDANCE N/A 05/17/2024    Procedure: Dilation and curettage of the uterus with drainage of uterine fluid under ultrasound guidance, lysis of vaginal adhesions;  Surgeon: Dany Perez MD;  Location: [facility]    EXAM UNDER ANESTHESIA PELVIC N/A 03/12/2020    Procedure: Exam under anesthsia, vaginal biopsies, possible CO2 laser of the vagina;  Surgeon: Lilliam Roy MD;  [facility]    GI SURGERY  2004    gastric bypass    HYSTERECTOMY TOTAL ABDOMINAL, BILATERAL SALPINGO-OOPHORECTOMY, COMBINED Bilateral 07/12/2024    Procedure: Diagnostic laparoscopy converted to exploratory laparotomy, total abdominal hysterectomy, bilateral salpingo-oophorectomy, lysis of adhesions >60 min;  Surgeon: Dany Perez MD;  Location: [facility]    INSERT PORT VASCULAR ACCESS N/A 08/26/2024    Procedure: Insert port vascular access;  Surgeon: Avery Gregory MD;  Location: [facility]    IR CHEST PORT PLACEMENT > 5 YRS OF AGE  08/26/2024    IR FOLLOW UP VISIT OUTPATIENT  1/7/2025    IR NEPHROSTOMY TUBE PLACEMENT LEFT  11/29/2024    LASER CO2 VAGINA N/A 03/02/2015    Procedure: LASER CO2 VAGINA;  Surgeon: Mariela Abdalla MD;  Location: [facility]    LASER CO2 VAGINA N/A 05/24/2019    Procedure: Exam under anesthesia, vaginal biopsies, CO2 laser of the vagina;  Surgeon: Lilliam Roy MD;  Location: [facility] OR    LASER CO2 VAGINA N/A 06/08/2021    Procedure: Exam under anesthesia, laser ablation of the upper vagina;  Surgeon: Dany Perez MD;  Location: [facility]    PICC DOUBLE LUMEN PLACEMENT Left 11/28/2024    left basilic 5 fr dl power picc 44 cm    REPAIR BLADDER N/A 07/12/2024    Procedure: Repair bladder;  Surgeon: Dany Perez MD;  Location: [facility]     Family History   Problem Relation Age  of Onset    Heart Disease Father     Heart Failure Father     No Known Problems Brother     No Known Problems Brother     No Known Problems Brother     Pancreatitis Brother     Hypertension Sister     Peripheral Vascular Disease Sister     No Known Problems Sister     No Known Problems Son     No Known Problems Daughter      Social History     Social History Narrative    Not on file     HOME MEDICATION(S): Home medication(s) personally reviewed as below.  Prior to Admission medications    Medication Sig Last Dose Taking? Auth Provider Long Term End Date   acetaminophen (TYLENOL) 325 MG tablet Take 3 tablets (975 mg) by mouth every 8 hours.   Maci Chandler MD No    albuterol (PROAIR HFA/PROVENTIL HFA/VENTOLIN HFA) 108 (90 Base) MCG/ACT inhaler Inhale 1-2 puffs into the lungs every 4 hours as needed for shortness of breath   Reported, Patient     calcium Citrate-vitamin D 500-400 MG-UNIT CHEW Take 1 tablet by mouth daily   Reported, Patient     cyanocobalamin (CYANOCOBALAMIN) 1000 MCG/ML injection Inject 1 mL (1,000 mcg) into a muscle every month.   Reported, Patient     diclofenac (VOLTAREN) 1 % topical gel Apply 4 g topically 4 times daily as needed for moderate pain.   Mar Chua MD     ferrous sulfate (CASSANDRA-IN-SOL) 75 (15 FE) MG/ML oral drops Take 15 mg by mouth daily   Reported, Patient     hydrocortisone 2.5 % cream Apply topically as needed   Reported, Patient     lidocaine (XYLOCAINE) 5 % external ointment Apply 1 Application topically as needed   Reported, Patient     magic mouthwash suspension, diphenhydrAMINE, lidocaine, aluminum-magnesium & simethicone, (FIRST-MOUTHWASH BLM) compounding kit Swish and spit 10 mLs in mouth 3 times daily (before meals).   Maci Chandler MD     melatonin 1 MG TABS tablet Take 1 tablet (1 mg) by mouth nightly as needed for sleep.   Maci Chandler MD     melatonin 3 MG tablet Take 1 tablet (3 mg) by mouth or Feeding Tube at bedtime.   Mar Chua MD     morphine  "0.5 % in solosite gel Apply 8-16 clicks (4-8 g) topically daily as needed for pain (with dressing changes).   Kamilah De La Torre PA-C     naloxone (NARCAN) 4 MG/0.1ML nasal spray Spray 1 spray (4 mg) into one nostril alternating nostrils as needed for opioid reversal every 2-3 minutes until assistance arrives   Gaurang Guajardo MD Yes    ondansetron (ZOFRAN) 8 MG tablet Take 1 tablet (8 mg) by mouth every 8 hours as needed for nausea   Kelsey Mccracken APRN CNP     oxyCODONE IR (ROXICODONE) 20 MG TABS immediate release tablet Take 20 mg by mouth every 4 hours as needed for severe pain (Chronic Pain).   Maci Chandler MD No    phenol (CHLORASEPTIC) 1.4 % spray Take 1-2 sprays (1-2 mLs) by mouth every hour as needed for sore throat.   Dany Perez MD     polyethylene glycol (MIRALAX) 17 GM/Dose powder Take 17 g by mouth daily as needed for constipation   Dany Perez MD     prochlorperazine (COMPAZINE) 5 MG tablet Take 1-2 tablets (5-10 mg) by mouth every 6 hours as needed for nausea or vomiting   Kelsey Mccracken APRN CNP     senna-docusate (SENOKOT-S/PERICOLACE) 8.6-50 MG tablet Take 1 tablet by mouth 2 times daily   Dany Perez MD     Skin Protectants, Misc. (INTERDRY 10\"X36\") SHEE Externally apply 10 each topically every 24 hours. Apply to skin folds on abdomen   Dany Perez MD     triamcinolone (KENALOG) 0.1 % external ointment Apply topically 3 times daily as needed for irritation.   Dany Perez MD       ALLERGIES  Ibuprofen, Shrimp, and Sulfa antibiotics    PHYSICAL EXAM     Vital signs reviewed as below.    Patient Vitals for the past 6 hrs:   BP Pulse Resp Temp Temp src   03/05/25 0200 117/79 83 -- -- --   03/05/25 0400 133/83 84 18 98.4  F (36.9  C) Oral   The exam is compromised by nothing, debilitated state, altered mental status, and delirium / dementia with poor cooperation - the patient was bent forward almost at a 90degree bend at the waist.  Patient had a very difficult time " rising her head.    GENERAL APPEARANCE: somnolent and difficult to arouse, cachectic, and chronically ill and debilitated  HEENT / EYES / EARS / OROPHARYNX: difficult to assess given her body habitus  NECK: Supple, no palpable lymphadenopathy, no masses, no thyromegaly. Neck veins not distended. ROM poor and abnormal.  Trachea midline.  LUNGS: Clear to auscultation with good airflow. No wheezes, rales or rhonchi.  No stridor.  HEART / CV / CHEST: Normal S1S2. RRR without rubs, gallops or clicks. No murmur noted. Some mild brawny edema of her extremities. No clubbing or cyanosis.  GI: Normal active bowel sounds. Soft and nontender.  No hepatosplenomegaly or palpable masses. No hernias appreciated.  HEME / LYMPH / SKIN / EXTREMITIES  SKIN: Exposed areas grossly normal, warm and dry without unusual signs of bleeding, bruising or petechiae.  EXTREMITIES / JOINTS / BACK:  difuse DJD  NEUROLOGIC EXAM  PSYCH / MENTAL STATUS: alert, oriented to person, place, and time. Insight, judgement and interactions with others appears abnormal\ - though she knew it was 2025 and winter..  CRANIAL NERVES: grossly ok  MOTOR: poor muscle tone and strength in all extremities.  SENSATION: Grossly normal in all extremity(ies) to light touch  CEREBELLAR: within normal limits on upper extremities coordination  GAIT: Not tested    LABS / XRAYS / RESULTS     Recent Labs   Lab 03/04/25  1448 02/26/25  1041   WBC 7.8  --    HGB 8.6*  --    MCV 92  --      --     141   POTASSIUM 3.5 3.6   CHLORIDE 105 105   CO2 24 22   BUN 29.1* 23.8*   CR 0.90 0.83   ANIONGAP 10 14   SAMUEL 8.8 9.6   GLC 98 87   ALBUMIN 3.0*  --    PROTTOTAL 5.3*  --    BILITOTAL 0.7  --    ALKPHOS 106  --    ALT 19  --    AST 20  --      Lab Results   Component Value Date    INR 1.24 02/05/2025    INR 1.33 12/12/2024    INR 1.09 11/27/2024     Recent Labs   Lab Test 12/12/24 2237 12/12/24 2055 11/07/24  1741   CTROPT 35* 35* 25*     I personally reviewed the XRAY(s)  listed below, including radiology images and corresponding radiologist reports if available.      I have personally reviewed the following data over the past 24 hrs:    9.7  \   8.2 (L)   / 140 (L)     139 105 29.1 (H) /  98   3.5 24 0.90 \     ALT: 19 AST: 20 AP: 106 TBILI: 0.7   ALB: 3.0 (L) TOT PROTEIN: 5.3 (L) LIPASE: N/A       Imaging results reviewed over the past 24 hrs:   Recent Results (from the past 24 hours)   CT Abdomen Pelvis w/o Contrast    Narrative    EXAM: CT ABDOMEN PELVIS W/O CONTRAST  LOCATION: [facility]  DATE: 3/4/2025    INDICATION: Abdominal pain; decrease oral appetite  COMPARISON: Renal ultrasound 2/7/2025, CT abdomen/pelvis 12/12/2024  TECHNIQUE: CT scan of the abdomen and pelvis was performed without IV contrast. Multiplanar reformats were obtained. Dose reduction techniques were used.  CONTRAST: None.    FINDINGS:   LOWER CHEST: Calcified granuloma in the left lower lobe. No airspace consolidation or pleural effusion.    HEPATOBILIARY: Cholelithiasis without evidence of acute cholecystitis or biliary obstruction.    PANCREAS: Normal.    SPLEEN: Multiple calcified splenic granulomas, unchanged from prior exam.    ADRENAL GLANDS: Normal.    KIDNEYS/BLADDER: Interval removal of left percutaneous nephrostomy tube. No hydronephrosis. Urinary bladder is decompressed by a suprapubic catheter, similar to prior exam.    BOWEL: Prior gastric bypass with jejunojejunostomy in expected position in the left midabdomen. No obstruction or inflammatory change.    LYMPH NODES: No suspicious lymphadenopathy. No abdominopelvic free fluid.    VASCULATURE: Moderate calcified atherosclerosis.    PELVIC ORGANS: Hysterectomy.    MUSCULOSKELETAL: No acute bony abnormality. Moderate to severe body wall edema, similar to prior exam.      Impression    IMPRESSION:   1.  No definite acute abnormality in the abdomen or pelvis on this noncontrast exam.  2.  Cholelithiasis  without evidence of acute cholecystitis or biliary obstruction.         Color Urine (no units)   Date Value   03/04/2025 Yellow   01/18/2021 Yellow     Appearance Urine (no units)   Date Value   03/04/2025 Slightly Cloudy (A)   01/18/2021 Slightly Cloudy     Glucose Urine (mg/dL)   Date Value   03/04/2025 Negative   01/18/2021 Negative     Bilirubin Urine (no units)   Date Value   03/04/2025 Negative   01/18/2021 Negative     Ketones Urine (mg/dL)   Date Value   03/04/2025 Negative   01/18/2021 Negative     Specific Gravity Urine (no units)   Date Value   03/04/2025 1.014   01/18/2021 1.024     pH Urine   Date Value   03/04/2025 5.5   01/18/2021 5.5 pH     Protein Albumin Urine (mg/dL)   Date Value   03/04/2025 50 (A)   01/18/2021 10 (A)     Urobilinogen Urine (E.U./dL)   Date Value   04/11/2024 1.0     Nitrite Urine (no units)   Date Value   03/04/2025 Negative   01/18/2021 Positive (A)     Leukocyte Esterase Urine (no units)   Date Value   03/04/2025 Large (A)   01/18/2021 Large (A)       ASSESSMENT / PROBLEM LIST     PRINCIPAL DIAGNOSIS: UTI with generalized feeling poorly consistent with UTI.    Pt is a 69 year old female with generally feeling ill - UTI explains this nicely with her last UTI and urine culture(s) showed enterococcuss.  Patient's UA is showing > 182 warranting antibiotic(s) treatment.  Unasyn good choice based on prior culture(s) results.    Assessment & Plan   Active Problems:    History of urinary tract infection    Lower abdominal pain    Suprapubic catheter (H)    Delirium    Acute UTI    Anemia of chronic disease without external evidence of bleeding    Additional diagnoses and notable risk factors for complications during hospitalization:  Clinically Significant Risk Factors Present on Admission               # Hypoalbuminemia: Lowest albumin = 3 g/dL at 3/4/2025  2:48 PM, will monitor as appropriate     # Hypertension: Noted on problem list      # Anemia: based on hgb <11           #  Financial/Environmental Concerns:           PLAN / OTHER ORDERS ENTERED   IV Unasyn  Usual medications  Continue close observation and monitoring  Transfuse if Hgb < 8.0 given her h/o cardiac disease    Admitted: Inpatient  Current disposition: Admit to the Adult Internal Medicine Service under Inpatient status    Scheduled and PRN medication(s):  Current Facility-Administered Medications   Medication Dose Route Frequency Provider Last Rate Last Admin    lactated ringers infusion   Intravenous Continuous Salvador Dickerson MD   Stopped at 03/05/25 0433   albuterol, 1-2 puff, Q4H PRN  sore throat, 1 lozenge, Q1H PRN  bisacodyl, 10 mg, Daily PRN  calcium carbonate, 1,000 mg, 4x Daily PRN  guaiFENesin-dextromethorphan, 10 mL, Q4H PRN  hydrALAZINE, 10 mg, Q4H PRN   Or  hydrALAZINE, 10 mg, Q4H PRN  lidocaine 4%, , Q1H PRN  lidocaine (buffered or not buffered), 0.1-1 mL, Q1H PRN  melatonin, 1 mg, At Bedtime PRN  miconazole, , BID PRN  ondansetron, 4 mg, Q6H PRN   Or  ondansetron, 4 mg, Q6H PRN  oxyCODONE, 20 mg, Q6H PRN  prochlorperazine, 5 mg, Q6H PRN   Or  prochlorperazine, 5 mg, Q6H PRN  senna-docusate, 1 tablet, BID PRN   Or  senna-docusate, 2 tablet, BID PRN  sodium chloride (PF), 3 mL, q1 min prn    More information will be necessary for our day shift house --- awaiting more information from family and possibly SNF,     Restraints: Not indicated  Discharge disposition: To be determined  Diet order:  Orders Placed This Encounter      Combination Diet Regular Diet Adult  DVT Prophylaxis: patient is not on anticoagulant prior to admission --- at increased risk given her immobility and h/o cancer.  Will use enoxaparin (Lovenox ) 30mg IMl  Shahid Catheter: Not present  Lines: PRESENT      Port a Cath 02/05/25 Single Lumen Right Chest wall-Site Assessment: WDL      Cardiac Monitoring: None  Code Status: Full Code    Patient / Family Communication: I discussed the details of her diagnoses and my recommendations with the  patient - though I suspect she did not retain much of my discussion with here.    Javi Dickerson MD

## 2025-03-05 NOTE — PROGRESS NOTES
CLINICAL NUTRITION SERVICES - ASSESSMENT NOTE    RECOMMENDATIONS FOR MDs/PROVIDERS TO ORDER:  Micronutrient recs with Kiko-en-y Gastric Bypass in long-term/post-discharge or per bariatrics:  -- Multivitamin/minerals: adult dose 2 times daily  -- Iron: 45-60 mg elemental daily (18-36 mg daily if low risk) - may partly or fully be covered in multivitamin   -- Calcium Citrate containing vitamin D: 500 mg 3 times daily or 600 mg 2 times daily  -- Vitamin B12: sublingual form of at least 500 mcg daily or injection of 1000 mcg monthly  -- B-50 Complex daily     Consider nutrition support if pt unable to consume at least 50% of meals    Malnutrition Status:    Severe malnutrition in the context of chronic illness    Registered Dietitian Interventions:  Encourage small, frequent meals and snacks with high protein sources  Ensure  Enlive  contains 350 kcal, 20 grams protein, 44 grams carbohydrate, 11 grams fat, 3 grams fiber per 8 fl oz.   Trial Boost soothe    Future/Additional Recommendations:  Monitor nutrition related findings and follow up per protocol     REASON FOR ASSESSMENT  Provider order - malnutrition    SUBJECTIVE INFORMATION  Assessed patient in room.    PERTINENT MEDICAL/CLINICAL HISTORY:  69 year old female with history of cervical cancer s/p radiation, serous endometrial adenocarcinoma s/p SNEHA/BSO and cystotomy w/ suprapubic catheter placement (7/2024) with history of ESBL urosepsis and bacteremia, CKD 3, Kiko-en-Y gastric bypass, A-fib on apixaban, and HTN who presents to the ED via EMS from home for possible UTI.     NUTRITION HISTORY  Visit cut short d/t patient tearful d/t pain and having trouble staying awake. Reports ok appetite PTA. Agreeable to ONS. EMR reviewed for additional history. Attempted to notify RN. Pt reports some nausea.       CURRENT NUTRITION ORDERS  Diet: Regular   Allergy: shrimp  CURRENT INTAKE/TOLERANCE  Intake of 25 % of 2 meal(s) so far.    LABS  Nutrition-relevant labs:  Cl 108,  "CO2 19, BUN 25.5, hgb 8.2, hematocrit 25.1, Vit B12 on 2/15 of 1144    MEDICATIONS  Nutrition-relevant medications:  biotene, magic mouthwash, abx,    ANTHROPOMETRICS  Height: 5'3\"  Most Recent Weight:    IBW: 52.3  kg  % IBW: 115%  There is no height or weight on file to calculate BMI..  BMI (kg/m ): Normal BMI  Weight History:  10.4 kg (15%) weight loss over ~ 1 month.  Wt Readings from Last 20 Encounters:   02/20/25 60.4 kg (133 lb 2.5 oz)   01/25/25 70.8 kg (156 lb)   01/14/25 70.8 kg (156 lb)   01/07/25 70.8 kg (156 lb)   12/30/24 66.2 kg (146 lb)   12/12/24 85.3 kg (188 lb)   12/08/24 85.5 kg (188 lb 7.9 oz)   11/09/24 63.2 kg (139 lb 5.3 oz)   11/04/24 63.5 kg (140 lb)   10/15/24 63.5 kg (140 lb)   10/10/24 63.5 kg (140 lb)   09/27/24 58.8 kg (129 lb 9.6 oz)   09/09/24 68.9 kg (151 lb 12.8 oz)   09/04/24 63.5 kg (140 lb)   08/27/24 63.5 kg (140 lb)   08/26/24 63.5 kg (140 lb)   08/22/24 63.5 kg (140 lb)   08/20/24 62.3 kg (137 lb 6.4 oz)   08/09/24 67.7 kg (149 lb 3.2 oz)   07/25/24 75 kg (165 lb 6.4 oz)         Dosing Weight: 60.4 kg, based on  most recent available weight    ASSESSED NUTRITION NEEDS  Estimated Energy Needs: 1664-9189 kcals/day (30 - 35 kcals/kg)  Justification: Increased needs  Estimated Protein Needs: 72-91 grams protein/day (1.2 - 1.5 grams of pro/kg)  Justification: Increased needs  Estimated Fluid Needs: 8451-1276 mL/day (30 - 35 mL/kg)  Justification: Maintenance    SYSTEM FINDINGS    Skin/wounds:  pressure injury  GI symptoms:  LBM PTA    MALNUTRITION  % Intake: Unable to assess  % Weight Loss: > 5% in 1 month (severe)   Subcutaneous Fat Loss: Triceps: Severe  Muscle Loss: Wasting of the temples (temporalis muscle): Severe and Scapula (latissimus dorsi, trapezious, deltoids): Severe  Fluid Accumulation/Edema: Unable to assess  Malnutrition Diagnosis: Severe malnutrition in the context of chronic illness  Malnutrition Present on Admission: Yes    NUTRITION DIAGNOSIS  Inadequate oral " intake related to poor appetite as evidenced by 10.4 kg (15%) weight loss over ~ 1 month.    INTERVENTIONS  Manage composition of oral intake  Medical food supplement therapy  Vitamin and mineral supplement therapy    Goals  Patient to consume % of nutritionally adequate meal trays TID, or the equivalent with supplements/snacks.     Monitoring/Evaluation  Progress toward goals will be monitored and evaluated per policy.    Nereida Ivy MS, RD, LD, [facility]    6C (beds 9783-9135) + 7C (beds 3248-7883) + ED + Obs  Available in Vocera by name or unit dietitian

## 2025-03-05 NOTE — PROGRESS NOTES
BRIEF MEDICINE PROGRESS NOTE    Changes done :  -changed abx from unasyn to zosyn IV due to history of MDRO  If she decompensates then broaden to meropenem   -when I saw her she was complaining of back pain due to her prior ulcers   WOC consult   -PT/OT  -spoke w/ family (daughter) about GOC and brought up hospice she said [facility] let me know tomorrow if they would be ok with palliative consult   -Gyn/onc consult placed, care conference tomorrow @ noon

## 2025-03-05 NOTE — CONSULTS
[facility] Gynecologic Oncology Consult Note    Alis Hartman MRN# [id number]   Age: 69 year old YOB: 1955     Date of Admission:  3/4/2025    Primary care provider: Maria Fernanda Eckert             Chief Complaint:   Diffusely unwell. History provided by chart review and conversation with patient.         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    Patient was very recently admitted here 2/5-2/27/2025 due to MADHU, deconditioning, and malnutrition. She was discharged to home with the hope that Aranza would do well in a familiar environment.     ED course (per chart review): Patient presented via EMS due to family concerns about patient's possible UTI. Since discharge, she has had altered mental status, decreased appetite, increasing weakness, and shortness of breath. Her urine output has also been reportedly cloudy, dark, malodorous and she has been running a low-grade fever for the past 2 days. UA with moderate bacteria, large number of WBC's concerned for UTI, for which she was started on Unasyn and admitted to the inpatient service.    When obtaining history from patient, she provides limited information. She notes oral pain with eating but says she has been eating well. She states she had abdominal pain since July. Other than this, she denies pain elsewhere. She denies any urinary concerns. She reports chills while in the room but does  not respond to questions about fevers. She denies chest pain or dyspnea.         Cancer Treatment History:       Cervical cancer (H)   3/1996 Initial Diagnosis     Cervical cancer     Moderately differentiated squamous cell carcinoma. She was treated with primary radiation therapy, unsure if she also had chemotherapy or not.  This treatment was at [facility].      12/22/2014 Procedure     Exam under anesthesia with LASER to the vagina for VAIN 3      5/24/2019 Procedure     12/27/18:  Pap:  HSIL, HPV+  5/24/19: PROCEDURES: Vaginal colposcopy, vaginal biopsy, CO2 laser of the vagina  VAGINAL BIOPSY:   - High grade squamous intraepithelial lesion (vaginal intraepithelial neoplasia 3)       3/12/2020 Procedure     10/14/19: Pap HSIL/other HPV+  2/28/2020: biopsy of vagina Vain III; MRI recommended prior to OR; however, patient did not complete this  3/12/2020: EUA, biopsies, LASER: VAIN III      6/8/2021 Procedure     6/8/2021: Exam under anesthesia, vaginal biopsies, laser ablation of the upper vagina with Dr. Perez, biopsies returned showing necrosis, no dysplasia. Hyperbaric oxygen therapy discussed and referral place. Patient was unable to pursue this due to need for transportation to the [facility].      4/6/2023 Imaging     4/6/2023 CT Urogram: IMPRESSION:  1.  Negative CT urogram. No urinary calculi, solid renal mass, or evidence of upper tract urothelial malignancy.   2.  Enlarged uterus, with marked distention of the endometrial canal by intermediate density material, possibly a mix of fluid and blood products. This may be related to cervical stenosis given the history of prior pelvic radiation. Uterine enlargement may contribute to urinary frequency/urgency.   3.  Indeterminate left pelvic/perirectal mass with rim calcifications measuring up to 4.5 cm. Differentials include an old hematoma or an atypical enlarged lymph node. Consider pelvic MRI with contrast and subtraction imaging for further evaluation. The  uterus and endometrium can also be further evaluated at that time.         Serous adenocarcinoma of uterus (H)     Initial Diagnosis     Uterine serous carcinoma      5/3/2024 Imaging     CT and ultrasound showing distended endometrial canal. No evidence of metastatic disease      5/17/2024 Surgery     D&C of the uterus with drainage of the uterine fluid under US guidance, lysis of vaginal adhesions, cystoscopy      5/17/2024 Pathology     Endometrial curettings: Endometrial serous carcinoma with extensive necrosis      7/12/2024 Surgery     Diagnostic laparoscopy converted to exploratory laparotomy, total abdominal hysterectomy, bilateral salpingo-oophorectomy, lysis of adhesions, bladder repair and insertion of suprapubic catheter by Dr. Monroy      7/12/2024 Pathology     Final pathology with uterine serous carcinoma, MMR-intact/q85-aslswifd/HER2 negative         7/12/2024 -  Cancer Staged     [id number] uterine serous carcinoma, omentum negative, washings negative, lymph nodes omitted due to radiation history and high grade histology      8/8/2024 - 10/15/2024 Chemotherapy     8/8/24: Cycle 1 Carboplatin AUC 5, paclitaxel 175mg/ m2  8/30/24: Cycle 2 Carboplatin AUC 5, paclitaxel 175mg/m2  10/15/24: Cycle 3 Carboplatin AUC 5, paclitaxel 135mg/m2, reduced due to recent sepsis  Discontinued after 3 cycles due to recurrent admissions      9/20/2024 - 9/27/2024 Hospital Admission     Admission for sepsis secondary to urinary tract infection      11/7/2024 - 11/10/2024 Hospital Admission     ED to hospital admission for weakness, near syncope, and dyspnea.       11/26/2024 - 12/8/2024 Hospital Admission     ED to hospital admission for sepsis of urinary source required ICU admission. Concerns for ureteral obstruction and left PNT placement. Severe malnutrition and anasarca leading to NGT placement for tube feeds. [facility] to [facility] nursing facility.      12/8/2024 Recommended Treatment     Discontinuation of remainder  of planned adjuvant therapy given life threatening complications with multiple admissions. Plan for surveillance.                Past Medical History:     Past Medical History:   Diagnosis Date    Atrial fibrillation (H)     Depression     Hypertension     Malignant neoplasm of endocervix (H)     Tx with radiation    Near syncope 11/7/2024    Orthopnea 9/21/2024    Other chronic pain     Low back    Schizophrenia (H)     Urinary incontinence             Past Surgical History:      Past Surgical History:   Procedure Laterality Date    ABDOMINOPLASTY      BIOPSY CERVICAL, LOCAL EXCISION, SINGLE/MULTIPLE  10/26/2011    Procedure:BIOPSY CERVICAL, LOCAL EXCISION, SINGLE/MULTIPLE; EUA, cervical biopsies; Surgeon:BETTY TINEO; Location:[facility] OR    BIOPSY VAGINAL N/A 06/08/2021    Procedure: BIOPSY, VAGINA;  Surgeon: Dany Perez MD;  Location: [facility] OR    CYSTOSCOPY N/A 05/17/2024    Procedure: Cystoscopy;  Surgeon: Dany Perez MD;  Location: [facility]    CYSTOSTOMY, INSERT TUBE SUPRAPUBIC, COMBINED  07/12/2024    Procedure: Insertion of supra-pubic catheter;  Surgeon: Dany Perez MD;  Location: [facility]    DILATION AND CURETTAGE, WITH ULTRASOUND GUIDANCE N/A 05/17/2024    Procedure: Dilation and curettage of the uterus with drainage of uterine fluid under ultrasound guidance, lysis of vaginal adhesions;  Surgeon: Dany Perez MD;  Location: [facility]    EXAM UNDER ANESTHESIA PELVIC N/A 03/12/2020    Procedure: Exam under anesthsia, vaginal biopsies, possible CO2 laser of the vagina;  Surgeon: Lilliam Roy MD;  [facility]: [facility]    GI SURGERY  2004    gastric bypass    HYSTERECTOMY TOTAL ABDOMINAL, BILATERAL SALPINGO-OOPHORECTOMY, COMBINED Bilateral 07/12/2024    Procedure: Diagnostic laparoscopy converted to exploratory laparotomy, total abdominal hysterectomy, bilateral salpingo-oophorectomy, lysis of adhesions >60 min;  Surgeon: Dany Perez MD;  Location: [facility]    INSERT PORT VASCULAR ACCESS N/A  08/26/2024    Procedure: Insert port vascular access;  Surgeon: Avery Gregory MD;  Location: [facility]    IR CHEST PORT PLACEMENT > 5 YRS OF AGE  08/26/2024    IR FOLLOW UP VISIT OUTPATIENT  1/7/2025    IR NEPHROSTOMY TUBE PLACEMENT LEFT  11/29/2024    LASER CO2 VAGINA N/A 03/02/2015    Procedure: LASER CO2 VAGINA;  Surgeon: Mariela Abdalla MD;  Location: [facility] OR    LASER CO2 VAGINA N/A 05/24/2019    Procedure: Exam under anesthesia, vaginal biopsies, CO2 laser of the vagina;  Surgeon: Lilliam Ryo MD;  Location: [facility] OR    LASER CO2 VAGINA N/A 06/08/2021    Procedure: Exam under anesthesia, laser ablation of the upper vagina;  Surgeon: Dany Perez MD;  Location: [facility] OR    PICC DOUBLE LUMEN PLACEMENT Left 11/28/2024    left basilic 5 fr dl power picc 44 cm    REPAIR BLADDER N/A 07/12/2024    Procedure: Repair bladder;  Surgeon: Dany Perez MD;  Location: [facility] OR            Social History:     Social History     Tobacco Use    Smoking status: Never    Smokeless tobacco: Never   Substance Use Topics    Alcohol use: Not Currently            Family History:     Family History   Problem Relation Age of Onset    Heart Disease Father     Heart Failure Father     No Known Problems Brother     No Known Problems Brother     No Known Problems Brother     Pancreatitis Brother     Hypertension Sister     Peripheral Vascular Disease Sister     No Known Problems Sister     No Known Problems Son     No Known Problems Daughter             Allergies:     Allergies   Allergen Reactions    Ibuprofen Nausea and Vomiting    Shrimp     Sulfa Antibiotics Rash     Patient started on bactrim 7/24/24 tolerating medication without rash on 7/25             Medications:     Current Facility-Administered Medications   Medication Dose Route Frequency Provider Last Rate Last Admin    acetaminophen (TYLENOL) tablet 975 mg  975 mg Oral Q8H Salvador Dickerson MD   975 mg at 03/05/25 0716    albuterol (PROVENTIL HFA/VENTOLIN HFA)  inhaler  1-2 puff Inhalation Q4H PRN Salvador Dickerson MD        artificial saliva (BIOTENE MT) solution 1 spray  1 spray Mouth/Throat 4x Daily Salvador Dickerson MD        benzocaine-menthol (CHLORASEPTIC MAX) 15-10 MG lozenge 1 lozenge  1 lozenge Buccal Q1H PRN Salvador Dickerson MD        bisacodyl (DULCOLAX) suppository 10 mg  10 mg Rectal Daily PRN Salvador Dickerson MD        calcium carbonate (TUMS) chewable tablet 1,000 mg  1,000 mg Oral 4x Daily PRN Salvador Dickerson MD        enoxaparin ANTICOAGULANT (LOVENOX) injection 40 mg  40 mg Subcutaneous Q24H Salvador Dickerson MD   40 mg at 03/05/25 0834    guaiFENesin-dextromethorphan (ROBITUSSIN DM) 100-10 MG/5ML syrup 10 mL  10 mL Oral Q4H PRN Salvador Dickerson MD        hydrALAZINE (APRESOLINE) tablet 10 mg  10 mg Oral Q4H PRN Salvador Dickerson MD        Or    hydrALAZINE (APRESOLINE) injection 10 mg  10 mg Intravenous Q4H PRN Salvador Dickerson MD        lidocaine (LMX4) cream   Topical Q1H PRN Salvador Dickerson MD        lidocaine 1 % 0.1-1 mL  0.1-1 mL Other Q1H PRN Salvador Dickerson MD        magic mouthwash suspension (diphenhydramine, lidocaine, aluminum-magnesium & simethicone)  10 mL Swish & Spit TID AC Salvador Dickerson MD        melatonin tablet 1 mg  1 mg Oral At Bedtime PRN Salvador Dickerson MD   1 mg at 03/05/25 0040    miconazole (MICATIN) 2 % powder   Topical BID PRN Salvador Dickerson MD        naloxone (NARCAN) injection 0.2 mg  0.2 mg Intravenous Q2 Min PRSalvador Michele MD        Or    naloxone (NARCAN) injection 0.4 mg  0.4 mg Intravenous Q2 Min PRSalvador Michele MD        Or    naloxone (NARCAN) injection 0.2 mg  0.2 mg Intramuscular Q2 Min PRSalvador Michele MD        Or    naloxone (NARCAN) injection 0.4 mg  0.4 mg Intramuscular Q2 Min PRN Salvador Dickerson MD        ondansetron (ZOFRAN ODT) ODT tab 4 mg  4 mg Oral Q6H PRN Salvador Dickerson MD        Or    ondansetron (ZOFRAN) injection 4 mg  4 mg Intravenous Q6H PRN Salvador Dickerson  MD JOYA        oxyCODONE IR (ROXICODONE) tablet 20 mg  20 mg Oral Q6H PRSalvador Michele MD   20 mg at 03/05/25 0833    piperacillin-tazobactam (ZOSYN) 4.5 g vial to attach to  mL bag  4.5 g Intravenous Q6H Piter Webber MD   Stopped at 03/05/25 1040    prochlorperazine (COMPAZINE) injection 5 mg  5 mg Intravenous Q6H PRSalvador Michele MD        Or    prochlorperazine (COMPAZINE) tablet 5 mg  5 mg Oral Q6H PRSalvador Michele MD        senna-docusate (SENOKOT-S/PERICOLACE) 8.6-50 MG per tablet 1 tablet  1 tablet Oral BID PRSalvador Michele MD        Or    senna-docusate (SENOKOT-S/PERICOLACE) 8.6-50 MG per tablet 2 tablet  2 tablet Oral BID PRSalvador Michele MD        sodium chloride (PF) 0.9% PF flush 3 mL  3 mL Intracatheter Q8H Salvador Dickerson MD   3 mL at 03/05/25 0144    sodium chloride (PF) 0.9% PF flush 3 mL  3 mL Intracatheter q1 min Salvador Ricardo MD         Current Outpatient Medications   Medication Sig Dispense Refill    acetaminophen (TYLENOL) 325 MG tablet Take 3 tablets (975 mg) by mouth every 8 hours.      albuterol (PROAIR HFA/PROVENTIL HFA/VENTOLIN HFA) 108 (90 Base) MCG/ACT inhaler Inhale 1-2 puffs into the lungs every 4 hours as needed for shortness of breath      calcium Citrate-vitamin D 500-400 MG-UNIT CHEW Take 1 tablet by mouth daily      cyanocobalamin (CYANOCOBALAMIN) 1000 MCG/ML injection Inject 1 mL (1,000 mcg) into a muscle every month.      diclofenac (VOLTAREN) 1 % topical gel Apply 4 g topically 4 times daily as needed for moderate pain. 50 g 0    ferrous sulfate (CASSANDRA-IN-SOL) 75 (15 FE) MG/ML oral drops Take 15 mg by mouth daily      hydrocortisone 2.5 % cream Apply topically as needed      lidocaine (XYLOCAINE) 5 % external ointment Apply 1 Application topically as needed      magic mouthwash suspension, diphenhydrAMINE, lidocaine, aluminum-magnesium & simethicone, (FIRST-MOUTHWASH BLM) compounding kit Swish and spit 10 mLs in mouth 3 times daily  "(before meals). 119 mL 1    melatonin 1 MG TABS tablet Take 1 tablet (1 mg) by mouth nightly as needed for sleep. 30 tablet 1    melatonin 3 MG tablet Take 1 tablet (3 mg) by mouth or Feeding Tube at bedtime. 30 tablet 0    morphine 0.5 % in solosite gel Apply 8-16 clicks (4-8 g) topically daily as needed for pain (with dressing changes). 40 g 0    naloxone (NARCAN) 4 MG/0.1ML nasal spray Spray 1 spray (4 mg) into one nostril alternating nostrils as needed for opioid reversal every 2-3 minutes until assistance arrives 2 each 0    ondansetron (ZOFRAN) 8 MG tablet Take 1 tablet (8 mg) by mouth every 8 hours as needed for nausea 20 tablet 0    oxyCODONE IR (ROXICODONE) 20 MG TABS immediate release tablet Take 20 mg by mouth every 4 hours as needed for severe pain (Chronic Pain). 30 tablet 0    phenol (CHLORASEPTIC) 1.4 % spray Take 1-2 sprays (1-2 mLs) by mouth every hour as needed for sore throat. 118 mL 3    polyethylene glycol (MIRALAX) 17 GM/Dose powder Take 17 g by mouth daily as needed for constipation 510 g 0    prochlorperazine (COMPAZINE) 5 MG tablet Take 1-2 tablets (5-10 mg) by mouth every 6 hours as needed for nausea or vomiting 40 tablet 0    senna-docusate (SENOKOT-S/PERICOLACE) 8.6-50 MG tablet Take 1 tablet by mouth 2 times daily 60 tablet 0    Skin Protectants, Misc. (INTERDRY 10\"X36\") SHEE Externally apply 10 each topically every 24 hours. Apply to skin folds on abdomen 10 each 1    triamcinolone (KENALOG) 0.1 % external ointment Apply topically 3 times daily as needed for irritation. 30 g 1     Facility-Administered Medications Ordered in Other Encounters   Medication Dose Route Frequency Provider Last Rate Last Admin    heparin lock flush 100 unit/mL injection 500 Units  500 Units Intracatheter Once Dany Perez MD        sodium chloride (PF) 0.9% PF flush 10 mL  10 mL Intracatheter Once Dany Perez MD                Review of Systems:     Negative except as noted in HPI. Limited ROS due to " historian mental status.         Physical Exam:     Vitals:    03/05/25 0400 03/05/25 0830 03/05/25 0900 03/05/25 1200   BP: 133/83 123/71 116/74 (!) 147/75   BP Location: Right arm      Pulse: 84 83 74 85   Resp: 18      Temp: 98.4  F (36.9  C)      TempSrc: Oral      SpO2:         Constitutional: Emaciated, chronically ill appearing woman in no acute distress. Sitting hunched over tray of half-eaten hamburger, face laying on tray at times. Patient declines to reposition due to cold.  HEENT: Limited exam as face obscured  Cardiovascular: Regular rate and rhythm without murmurs, clicks, gallops or rub  Respiratory: Clear to auscultation bilaterally without crackles or wheeze  Gastrointestinal: Unable to examine abdomen as patient declines to reposition.  Pelvic Exam: Deferred  Neuro: Awake and alert.   Extremities: Limited exam. 2+ dorsalis pedis pulses bilaterally. Trace edema bilateral lower ankles.   Skin: limited exam, Arms and legs covered by clothing and blankets, patient declines to reposition.   Psychiatric: speech is mumbling at times, limited responses to questions         Data:     Results for orders placed or performed during the hospital encounter of 03/04/25 (from the past 24 hours)   Comprehensive metabolic panel   Result Value Ref Range    Sodium 139 135 - 145 mmol/L    Potassium 3.5 3.4 - 5.3 mmol/L    Carbon Dioxide (CO2) 24 22 - 29 mmol/L    Anion Gap 10 7 - 15 mmol/L    Urea Nitrogen 29.1 (H) 8.0 - 23.0 mg/dL    Creatinine 0.90 0.51 - 0.95 mg/dL    GFR Estimate 68 >60 mL/min/1.73m2    Calcium 8.8 8.8 - 10.4 mg/dL    Chloride 105 98 - 107 mmol/L    Glucose 98 70 - 99 mg/dL    Alkaline Phosphatase 106 40 - 150 U/L    AST 20 0 - 45 U/L    ALT 19 0 - 50 U/L    Protein Total 5.3 (L) 6.4 - 8.3 g/dL    Albumin 3.0 (L) 3.5 - 5.2 g/dL    Bilirubin Total 0.7 <=1.2 mg/dL   CBC with platelets differential    Narrative    The following orders were created for panel order CBC with platelets  differential.  Procedure                               Abnormality         Status                     ---------                               -----------         ------                     CBC with platelets and d...[[id number]]  Abnormal            Final result                 Please view results for these tests on the individual orders.   Blood Culture Portacath    Specimen: Portacath; Blood   Result Value Ref Range    Culture No growth after 12 hours    CBC with platelets and differential   Result Value Ref Range    WBC Count 7.8 4.0 - 11.0 10e3/uL    RBC Count 2.83 (L) 3.80 - 5.20 10e6/uL    Hemoglobin 8.6 (L) 11.7 - 15.7 g/dL    Hematocrit 26.0 (L) 35.0 - 47.0 %    MCV 92 78 - 100 fL    MCH 30.4 26.5 - 33.0 pg    MCHC 33.1 31.5 - 36.5 g/dL    RDW 23.7 (H) 10.0 - 15.0 %    Platelet Count 150 150 - 450 10e3/uL    % Neutrophils 68 %    % Lymphocytes 19 %    % Monocytes 12 %    % Eosinophils 0 %    % Basophils 0 %    % Immature Granulocytes 1 %    NRBCs per 100 WBC 0 <1 /100    Absolute Neutrophils 5.3 1.6 - 8.3 10e3/uL    Absolute Lymphocytes 1.5 0.8 - 5.3 10e3/uL    Absolute Monocytes 0.9 0.0 - 1.3 10e3/uL    Absolute Eosinophils 0.0 0.0 - 0.7 10e3/uL    Absolute Basophils 0.0 0.0 - 0.2 10e3/uL    Absolute Immature Granulocytes 0.1 <=0.4 10e3/uL    Absolute NRBCs 0.0 10e3/uL   Blood Culture Hand, Left    Specimen: Hand, Left; Blood   Result Value Ref Range    Culture No growth after 12 hours    UA with Microscopic reflex to Culture    Specimen: Urine, NOS   Result Value Ref Range    Color Urine Yellow Colorless, Straw, Light Yellow, Yellow    Appearance Urine Slightly Cloudy (A) Clear    Glucose Urine Negative Negative mg/dL    Bilirubin Urine Negative Negative    Ketones Urine Negative Negative mg/dL    Specific Gravity Urine 1.014 1.003 - 1.035    Blood Urine Large (A) Negative    pH Urine 5.5 5.0 - 7.0    Protein Albumin Urine 50 (A) Negative mg/dL    Urobilinogen Urine Normal Normal, 2.0 mg/dL    Nitrite Urine  Negative Negative    Leukocyte Esterase Urine Large (A) Negative    Bacteria Urine Moderate (A) None Seen /HPF    WBC Clumps Urine Present (A) None Seen /HPF    Mucus Urine Present (A) None Seen /LPF    RBC Urine >182 (H) <=2 /HPF    WBC Urine >182 (H) <=5 /HPF    Squamous Epithelials Urine 2 (H) <=1 /HPF    Hyaline Casts Urine 2 <=2 /LPF    Narrative    Urine Culture ordered based on laboratory criteria   CT Abdomen Pelvis w/o Contrast    Narrative    EXAM: CT ABDOMEN PELVIS W/O CONTRAST  LOCATION: [facility]  DATE: 3/4/2025    INDICATION: Abdominal pain; decrease oral appetite  COMPARISON: Renal ultrasound 2/7/2025, CT abdomen/pelvis 12/12/2024  TECHNIQUE: CT scan of the abdomen and pelvis was performed without IV contrast. Multiplanar reformats were obtained. Dose reduction techniques were used.  CONTRAST: None.    FINDINGS:   LOWER CHEST: Calcified granuloma in the left lower lobe. No airspace consolidation or pleural effusion.    HEPATOBILIARY: Cholelithiasis without evidence of acute cholecystitis or biliary obstruction.    PANCREAS: Normal.    SPLEEN: Multiple calcified splenic granulomas, unchanged from prior exam.    ADRENAL GLANDS: Normal.    KIDNEYS/BLADDER: Interval removal of left percutaneous nephrostomy tube. No hydronephrosis. Urinary bladder is decompressed by a suprapubic catheter, similar to prior exam.    BOWEL: Prior gastric bypass with jejunojejunostomy in expected position in the left midabdomen. No obstruction or inflammatory change.    LYMPH NODES: No suspicious lymphadenopathy. No abdominopelvic free fluid.    VASCULATURE: Moderate calcified atherosclerosis.    PELVIC ORGANS: Hysterectomy.    MUSCULOSKELETAL: No acute bony abnormality. Moderate to severe body wall edema, similar to prior exam.      Impression    IMPRESSION:   1.  No definite acute abnormality in the abdomen or pelvis on this noncontrast exam.  2.  Cholelithiasis without evidence  of acute cholecystitis or biliary obstruction.     Comprehensive metabolic panel   Result Value Ref Range    Sodium 140 135 - 145 mmol/L    Potassium 3.5 3.4 - 5.3 mmol/L    Carbon Dioxide (CO2) 19 (L) 22 - 29 mmol/L    Anion Gap 13 7 - 15 mmol/L    Urea Nitrogen 25.5 (H) 8.0 - 23.0 mg/dL    Creatinine 0.81 0.51 - 0.95 mg/dL    GFR Estimate 77 >60 mL/min/1.73m2    Calcium 8.0 (L) 8.8 - 10.4 mg/dL    Chloride 108 (H) 98 - 107 mmol/L    Glucose 82 70 - 99 mg/dL    Alkaline Phosphatase 105 40 - 150 U/L    AST 21 0 - 45 U/L    ALT 18 0 - 50 U/L    Protein Total 4.9 (L) 6.4 - 8.3 g/dL    Albumin 2.8 (L) 3.5 - 5.2 g/dL    Bilirubin Total 0.8 <=1.2 mg/dL   CBC with platelets   Result Value Ref Range    WBC Count 9.7 4.0 - 11.0 10e3/uL    RBC Count 2.77 (L) 3.80 - 5.20 10e6/uL    Hemoglobin 8.2 (L) 11.7 - 15.7 g/dL    Hematocrit 25.1 (L) 35.0 - 47.0 %    MCV 91 78 - 100 fL    MCH 29.6 26.5 - 33.0 pg    MCHC 32.7 31.5 - 36.5 g/dL    RDW 23.7 (H) 10.0 - 15.0 %    Platelet Count 140 (L) 150 - 450 10e3/uL             Assessment and Plan:   Assessment: 69 year old female with history of cervical cancer treated with radiation therapy in 1996, more recently serous uterine cancer s/p SNEHA-BSO with cystotomy and suprapubic catheter due to pre-existing bladder dysfunction s/p 3 cycles carboplatin/paclitaxel then discontinued due to life-threatening complications of sepsis requiring multiple hospitalizations; declining functional status; and malnutrition. Admitted on the inpatient medicine service with a UTI and poor functional status at home, concerning for failure to thrive.       Serous uterine cancer  Given above complications with chemotherapy (multiple admissions, sepsis) and malnutrition and deconditioning, not a candidate for further chemotherapy at this time. On CT abdomen/pelvis completed yesterday, there is no evidence of recurrence at this time, though she is at risk for future recurrence.     Failure to thrive  Our  gynecologic oncology team has a longstanding relationship with the patient and her family, particularly her daughter and grandson. Given decreasing functional status and failure to thrive, we would recommend and would be happy to assist with a goals of care conversation. Dr. Perez will be available at noon tomorrow (3/6) if possible to coordinate a meeting at that time.    Patient discussed with Dr. Avina, gynecologic oncology fellow.    Radha Vigil MD  Gyn Onc, PGY-1  Team Pager: [phone]

## 2025-03-05 NOTE — PROGRESS NOTES
Neuro: A&Ox2/3 unable to state time and place. Pt has been tearful majority of the night wanting company in the room. Pt reassured and had the family on the phone to help comfort the patient. There was constant yelling and the writer had to frequently attend to her needs  Cardiac: SR. VSS.   Respiratory: Sating adequately on RA.  GI/: Low urine output from suprapubic, provider made aware and bolus of fluids administered.   Diet/appetite: Poor appetite, small frequent bites encouraged.   Activity:  Assist of 2, constantly repositioned patient for comfort.  Pain: Prn oxycodone and tylenol administered  Skin: Mepilex dressing on pressure wounds.   LDA's: R chest port infusing 100 ml/hr of LR    Plan: Continue with POC. Notify primary team with changes.

## 2025-03-06 VITALS
SYSTOLIC BLOOD PRESSURE: 118 MMHG | DIASTOLIC BLOOD PRESSURE: 102 MMHG | TEMPERATURE: 97 F | OXYGEN SATURATION: 100 % | WEIGHT: 134.92 LBS | HEART RATE: 94 BPM | RESPIRATION RATE: 15 BRPM | BODY MASS INDEX: 23.9 KG/M2

## 2025-03-06 LAB
ALBUMIN SERPL BCG-MCNC: 2.6 G/DL (ref 3.5–5.2)
ALP SERPL-CCNC: 105 U/L (ref 40–150)
ALT SERPL W P-5'-P-CCNC: 19 U/L (ref 0–50)
AMMONIA PLAS-SCNC: 37 UMOL/L (ref 11–51)
ANION GAP SERPL CALCULATED.3IONS-SCNC: 10 MMOL/L (ref 7–15)
AST SERPL W P-5'-P-CCNC: 25 U/L (ref 0–45)
BACTERIA BLD CULT: NORMAL
BACTERIA BLD CULT: NORMAL
BACTERIA UR CULT: NORMAL
BASOPHILS # BLD AUTO: 0 10E3/UL (ref 0–0.2)
BASOPHILS NFR BLD AUTO: 0 %
BILIRUB SERPL-MCNC: 0.8 MG/DL
BUN SERPL-MCNC: 24.9 MG/DL (ref 8–23)
BURR CELLS BLD QL SMEAR: ABNORMAL
CALCIUM SERPL-MCNC: 8.2 MG/DL (ref 8.8–10.4)
CHLORIDE SERPL-SCNC: 106 MMOL/L (ref 98–107)
CREAT SERPL-MCNC: 0.9 MG/DL (ref 0.51–0.95)
EGFRCR SERPLBLD CKD-EPI 2021: 68 ML/MIN/1.73M2
ELLIPTOCYTES BLD QL SMEAR: SLIGHT
EOSINOPHIL # BLD AUTO: 0 10E3/UL (ref 0–0.7)
EOSINOPHIL NFR BLD AUTO: 0 %
ERYTHROCYTE [DISTWIDTH] IN BLOOD BY AUTOMATED COUNT: 23.3 % (ref 10–15)
FOLATE SERPL-MCNC: 17.5 NG/ML (ref 4.6–34.8)
FRAGMENTS BLD QL SMEAR: SLIGHT
GLUCOSE SERPL-MCNC: 70 MG/DL (ref 70–99)
HCO3 SERPL-SCNC: 22 MMOL/L (ref 22–29)
HCT VFR BLD AUTO: 23.9 % (ref 35–47)
HGB BLD-MCNC: 7.8 G/DL (ref 11.7–15.7)
IMM GRANULOCYTES # BLD: 0 10E3/UL
IMM GRANULOCYTES NFR BLD: 1 %
LYMPHOCYTES # BLD AUTO: 1.3 10E3/UL (ref 0.8–5.3)
LYMPHOCYTES NFR BLD AUTO: 17 %
MAGNESIUM SERPL-MCNC: 1.5 MG/DL (ref 1.7–2.3)
MAGNESIUM SERPL-MCNC: 2.7 MG/DL (ref 1.7–2.3)
MCH RBC QN AUTO: 30.4 PG (ref 26.5–33)
MCHC RBC AUTO-ENTMCNC: 32.6 G/DL (ref 31.5–36.5)
MCV RBC AUTO: 93 FL (ref 78–100)
MONOCYTES # BLD AUTO: 0.6 10E3/UL (ref 0–1.3)
MONOCYTES NFR BLD AUTO: 7 %
NEUTROPHILS # BLD AUTO: 5.6 10E3/UL (ref 1.6–8.3)
NEUTROPHILS NFR BLD AUTO: 74 %
NRBC # BLD AUTO: 0 10E3/UL
NRBC BLD AUTO-RTO: 0 /100
PHOSPHATE SERPL-MCNC: 2.4 MG/DL (ref 2.5–4.5)
PLAT MORPH BLD: ABNORMAL
PLATELET # BLD AUTO: 134 10E3/UL (ref 150–450)
POLYCHROMASIA BLD QL SMEAR: SLIGHT
POTASSIUM SERPL-SCNC: 3.3 MMOL/L (ref 3.4–5.3)
POTASSIUM SERPL-SCNC: 3.5 MMOL/L (ref 3.4–5.3)
PROT SERPL-MCNC: 4.8 G/DL (ref 6.4–8.3)
RBC # BLD AUTO: 2.57 10E6/UL (ref 3.8–5.2)
RBC MORPH BLD: ABNORMAL
SODIUM SERPL-SCNC: 138 MMOL/L (ref 135–145)
TARGETS BLD QL SMEAR: ABNORMAL
VIT B12 SERPL-MCNC: 1186 PG/ML (ref 232–1245)
WBC # BLD AUTO: 7.6 10E3/UL (ref 4–11)

## 2025-03-06 PROCEDURE — 84100 ASSAY OF PHOSPHORUS: CPT | Performed by: INTERNAL MEDICINE

## 2025-03-06 PROCEDURE — 83735 ASSAY OF MAGNESIUM: CPT | Performed by: INTERNAL MEDICINE

## 2025-03-06 PROCEDURE — 250N000013 HC RX MED GY IP 250 OP 250 PS 637

## 2025-03-06 PROCEDURE — 99232 SBSQ HOSP IP/OBS MODERATE 35: CPT

## 2025-03-06 PROCEDURE — 120N000002 HC R&B MED SURG/OB UMMC

## 2025-03-06 PROCEDURE — 36591 DRAW BLOOD OFF VENOUS DEVICE: CPT

## 2025-03-06 PROCEDURE — 85004 AUTOMATED DIFF WBC COUNT: CPT

## 2025-03-06 PROCEDURE — 82607 VITAMIN B-12: CPT

## 2025-03-06 PROCEDURE — 258N000003 HC RX IP 258 OP 636

## 2025-03-06 PROCEDURE — 84132 ASSAY OF SERUM POTASSIUM: CPT

## 2025-03-06 PROCEDURE — 36415 COLL VENOUS BLD VENIPUNCTURE: CPT

## 2025-03-06 PROCEDURE — G0463 HOSPITAL OUTPT CLINIC VISIT: HCPCS

## 2025-03-06 PROCEDURE — 250N000013 HC RX MED GY IP 250 OP 250 PS 637: Performed by: INTERNAL MEDICINE

## 2025-03-06 PROCEDURE — 999N000147 HC STATISTIC PT IP EVAL DEFER

## 2025-03-06 PROCEDURE — 83735 ASSAY OF MAGNESIUM: CPT

## 2025-03-06 PROCEDURE — 84100 ASSAY OF PHOSPHORUS: CPT

## 2025-03-06 PROCEDURE — 82140 ASSAY OF AMMONIA: CPT

## 2025-03-06 PROCEDURE — 250N000011 HC RX IP 250 OP 636: Performed by: INTERNAL MEDICINE

## 2025-03-06 PROCEDURE — 82247 BILIRUBIN TOTAL: CPT

## 2025-03-06 PROCEDURE — 82746 ASSAY OF FOLIC ACID SERUM: CPT

## 2025-03-06 PROCEDURE — 250N000011 HC RX IP 250 OP 636

## 2025-03-06 RX ORDER — OXYCODONE HYDROCHLORIDE 10 MG/1
20 TABLET ORAL EVERY 4 HOURS PRN
Status: DISCONTINUED | OUTPATIENT
Start: 2025-03-06 | End: 2025-03-07

## 2025-03-06 RX ORDER — MAGNESIUM SULFATE HEPTAHYDRATE 40 MG/ML
4 INJECTION, SOLUTION INTRAVENOUS ONCE
Status: COMPLETED | OUTPATIENT
Start: 2025-03-06 | End: 2025-03-06

## 2025-03-06 RX ORDER — POTASSIUM CHLORIDE 750 MG/1
40 TABLET, EXTENDED RELEASE ORAL ONCE
Status: COMPLETED | OUTPATIENT
Start: 2025-03-06 | End: 2025-03-06

## 2025-03-06 RX ADMIN — Medication 1 SPRAY: at 13:33

## 2025-03-06 RX ADMIN — OXYCODONE HYDROCHLORIDE 20 MG: 10 TABLET ORAL at 08:55

## 2025-03-06 RX ADMIN — ACETAMINOPHEN 975 MG: 325 TABLET, FILM COATED ORAL at 23:41

## 2025-03-06 RX ADMIN — DIPHENHYDRAMINE HYDROCHLORIDE AND LIDOCAINE HYDROCHLORIDE AND ALUMINUM HYDROXIDE AND MAGNESIUM HYDRO 10 ML: KIT at 13:33

## 2025-03-06 RX ADMIN — OXYCODONE HYDROCHLORIDE 20 MG: 10 TABLET ORAL at 02:51

## 2025-03-06 RX ADMIN — ACETAMINOPHEN 975 MG: 325 TABLET, FILM COATED ORAL at 08:40

## 2025-03-06 RX ADMIN — PIPERACILLIN AND TAZOBACTAM 4.5 G: 4; .5 INJECTION, POWDER, FOR SOLUTION INTRAVENOUS at 02:44

## 2025-03-06 RX ADMIN — POTASSIUM & SODIUM PHOSPHATES POWDER PACK 280-160-250 MG 1 PACKET: 280-160-250 PACK at 13:31

## 2025-03-06 RX ADMIN — OXYCODONE HYDROCHLORIDE 20 MG: 10 TABLET ORAL at 18:46

## 2025-03-06 RX ADMIN — POTASSIUM CHLORIDE 40 MEQ: 750 TABLET, EXTENDED RELEASE ORAL at 09:53

## 2025-03-06 RX ADMIN — Medication 1 SPRAY: at 21:14

## 2025-03-06 RX ADMIN — SODIUM CHLORIDE, SODIUM LACTATE, POTASSIUM CHLORIDE, AND CALCIUM CHLORIDE 500 ML: .6; .31; .03; .02 INJECTION, SOLUTION INTRAVENOUS at 16:05

## 2025-03-06 RX ADMIN — Medication 1 SPRAY: at 08:41

## 2025-03-06 RX ADMIN — OXYCODONE HYDROCHLORIDE 20 MG: 10 TABLET ORAL at 13:31

## 2025-03-06 RX ADMIN — DIPHENHYDRAMINE HYDROCHLORIDE AND LIDOCAINE HYDROCHLORIDE AND ALUMINUM HYDROXIDE AND MAGNESIUM HYDRO 10 ML: KIT at 08:41

## 2025-03-06 RX ADMIN — ENOXAPARIN SODIUM 40 MG: 40 INJECTION SUBCUTANEOUS at 08:41

## 2025-03-06 RX ADMIN — POTASSIUM & SODIUM PHOSPHATES POWDER PACK 280-160-250 MG 1 PACKET: 280-160-250 PACK at 09:53

## 2025-03-06 RX ADMIN — MAGNESIUM SULFATE HEPTAHYDRATE 4 G: 40 INJECTION, SOLUTION INTRAVENOUS at 09:53

## 2025-03-06 ASSESSMENT — ACTIVITIES OF DAILY LIVING (ADL)
ADLS_ACUITY_SCORE: 75

## 2025-03-06 NOTE — PROVIDER NOTIFICATION
Provider notified (name): Muddury  Reason for notification: patient's urinary output since 0700 is 100ml  Recommendation/request given to provider: please advise  Response from provider: waiting on response

## 2025-03-06 NOTE — CARE CONFERENCE
Gyn Onc Attending Note   I met with Aranza and spoke with the primary team and spoke with her daughter, Ti, by phone. Her daughter unfortunately had something come up and was unable to be there, but we were able to have a thorough conversation by phone.     We reviewed that Aranza's current situation as well as what has happened over the last several month. We discussed the continued decline in her condition. I did relay that the recent CT scan shows no evidence of cancer recurrence. This is difficult in some ways because despite being cancer free she has continued to decline since we had to discontinue her cancer therapy.     We discussed that her current state is likely secondary to effects of chemotherapy combined with infections from the suprapubic catheter. Catheter care has been a challenge for her. This is also combined with increasing immobility and I do think her mental health has declined significantly since the beginning of this year and my opinion is that it is contributing to her current state. Her daughter and the patient have not wanted her to have any psychiatric medications during previous admissions due to an episode of overmedication that happened while at a prior TCU, but she seemed more open to this possibility today.     Regardless, I discussed that currently she is dying slowly and that the status quo cannot continue. It is very clear that the only options for discharge at this point would be either to a long term care facility or with hospice care. If she wishes to go to long term care, I think this is only possibly going to have any success with something to improve her mental health and/or nutritional intake; however, I was very clear with her family that even with the maximal involvement I'm not sure how much improvement we might make, as we were unable to gain much ground when we have had her admitted even when we used a feeding tube with full nutrition support earlier this year. We  discussed what hospice is and what it would be able to provide. Her family is very loving and involved, but I think due to their level of health literacy it has been challenging to help the family understand just how much care she really needs at home and I don't think it is not safe for us to send her home in her current state even with home care services, which I tried to impress. Aranza has consistently insisted on her desire to be at home and she continues to express that today. I think if this is ultimately the most important thing to them, the only way to accomplish that would be with hospice involvement, but I recognize this is a hard thing for them to comprehend given her cancer is in remission.     I do think that they would benefit from Palliative Care involvement to help them navigate goals of care decision and come up with a pain management plan, if they are amenable. I am happy to be involved with any future care conferences, given I have a longstanding relationship with the patient and her family.     Dany Perez MD   Gynecologic Oncology

## 2025-03-06 NOTE — CONSULTS
"[facility] Nurse Inpatient Assessment     Consulted for: coccyx    Summary: known by [facility] from previous admission     WO nurse follow-up plan: weekly    Patient History (according to provider note(s):      The patient is a 69 year old female, with an extensive PMHx which is nicely documented in Dr. Khan and Mis's note from the ER.  Per their notes:  Ms. 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  The patient reportedly was feeling sick and her \"caretakers\" wanted her seen by a physician.  Apparently she has not been feeling well since her last discharge from the hospital with increased confusion, poor appetite and increased generalized weakness which now she is clearly confined to her bed because of this. Her urine output(UOP) has been foul smelling and lower amounts.  The patient refused to come to the ER 2 days ago.  The patient denies any other complaints but again has a difficult time offering a cogent history as to how she is feeling and what she's complaining of.  The remainder of the history of present illness is limited given the patient's altered mental status    Assessment:      Areas visualized during today's visit: Sacrum/coccyx    Pressure Injury Location: Sacrum/coccyx                           2/18    Last photo: 3/6  Wound type: Pressure Injury and Shear     Pressure Injury Stage: site of previously healed pressure injury, present on admission     Wound history/plan of care:   unknown worst stage, currently appears to be a stage 2 but was possibly deeper. Was noted as a reinjury on her last assessment here 12/4/24 and 1/27/25  Wound base: 100 % Fibrin of open area " with purple intact around.      Palpation of the wound bed: normal      Drainage: small     Description of drainage: none     Measurements (length x width x depth, in cm) total area 7 x 4 x 0.1 cm   Periwound skin: Intact and Erythema- blanchable      Color: normal and consistent with surrounding tissue      Temperature: normal   Odor: none  Pain: moderate, tender  Pain intervention prior to dressing change: slow and gentle cares   Treatment goal: Heal   STATUS: initial assessment  Supplies ordered: discussed with RN and discussed with patient       Treatment Plan:     sacrum wound(s): Every other day and PRN cleanse with wound cleanser and pat dry. Paint quang wound skin with no sting. Apply Triad paste(order#[id number]) over open wound and cover with sacral mepilex. AVOID supine position if able.     Orders: Written    RECOMMEND PRIMARY TEAM ORDER: None, at this time  Education provided: importance of repositioning, plan of care, and wound progress  Discussed plan of care with: Patient and Nurse  Notify WOC if wound(s) deteriorate.  Nursing to notify the Provider(s) and re-consult the WOC Nurse if new skin concern.    DATA:     Current support surface: Standard  Standard gel mattress (Isoflex)  Containment of urine/stool: Incontinence Protocol and Indwelling catheter  BMI: There is no height or weight on file to calculate BMI.   Active diet order: Orders Placed This Encounter      Combination Diet Regular Diet Adult     Output: I/O last 3 completed shifts:  In: 630 [P.O.:390; IV Piggyback:240]  Out: 280 [Urine:280]     Labs:   Recent Labs   Lab 03/06/25  0543 03/06/25  0324   ALBUMIN 2.6*  --    HGB  --  7.8*   WBC  --  7.6     Pressure injury risk assessment:   Sensory Perception: 2-->very limited  Moisture: 3-->occasionally moist  Activity: 2-->chairfast  Mobility: 2-->very limited  Nutrition: 1-->very poor  Friction and Shear: 1-->problem  Yogi Score: 11    Niharika Tai RN  CWOCN  Pager no longer is use,  please contact through Vocera   Vocera group: [facility] Nurse [place]  Dept. Office Number: [phone]

## 2025-03-06 NOTE — PROVIDER NOTIFICATION
Provider notified (name): Lawanda  Reason for notification: patient not wanting to change positions. Wanting to sit directly on pressure wound  Recommendation/request given to provider: JAZMYN  Response from provider:waiting on response

## 2025-03-06 NOTE — MEDICATION SCRIBE - ADMISSION MEDICATION HISTORY
Medication Scribe Admission Medication History    Admission medication history is complete. The information provided in this note is only as accurate as the sources available at the time of the update.    Information Source(s): Family member via phone    Pertinent Information: Daughter (Joy) states that no psych medications should be given without consulting her first. Daughter reports she has not taken the ferrous sulfate oral drops because her stomach has been upset. Per daughter, she does not have the senna-docusate 806-50 mg tablet at home but she needs some since she has not had a bowel movement. Daughter denies that she is taking any other medications. Dispense report and outside medication reconciliation list have been reviewed.     Changes made to PTA medication list:  Added: None  Deleted:   Melatonin 3 MG tablet   senna-docusate (SENOKOT-S/PERICOLACE) 8.6-50 MG tablet  Changed: None        Allergies reviewed with patient and updates made in EHR: yes    Medication History Completed By: Liz Archer 3/5/2025 9:51 PM    PTA Med List   Medication Sig Last Dose/Taking    acetaminophen (TYLENOL) 325 MG tablet Take 3 tablets (975 mg) by mouth every 8 hours. 3/4/2025    albuterol (PROAIR HFA/PROVENTIL HFA/VENTOLIN HFA) 108 (90 Base) MCG/ACT inhaler Inhale 1-2 puffs into the lungs every 4 hours as needed for shortness of breath Taking As Needed    calcium Citrate-vitamin D 500-400 MG-UNIT CHEW Take 1 tablet by mouth daily Unknown    cyanocobalamin (CYANOCOBALAMIN) 1000 MCG/ML injection Inject 1 mL (1,000 mcg) into a muscle every month. Unknown    diclofenac (VOLTAREN) 1 % topical gel Apply 4 g topically 4 times daily as needed for moderate pain. 3/3/2025    ferrous sulfate (CASSANDRA-IN-SOL) 75 (15 FE) MG/ML oral drops Take 15 mg by mouth daily Unknown    hydrocortisone 2.5 % cream Apply topically as needed Taking As Needed    lidocaine (XYLOCAINE) 5 % external ointment Apply 1 Application topically as needed  "Taking As Needed    magic mouthwash suspension, diphenhydrAMINE, lidocaine, aluminum-magnesium & simethicone, (FIRST-MOUTHWASH BLM) compounding kit Swish and spit 10 mLs in mouth 3 times daily (before meals). 3/4/2025    melatonin 1 MG TABS tablet Take 1 tablet (1 mg) by mouth nightly as needed for sleep. 3/3/2025 Bedtime    morphine 0.5 % in solosite gel Apply 8-16 clicks (4-8 g) topically daily as needed for pain (with dressing changes). Taking As Needed    naloxone (NARCAN) 4 MG/0.1ML nasal spray Spray 1 spray (4 mg) into one nostril alternating nostrils as needed for opioid reversal every 2-3 minutes until assistance arrives Taking As Needed    ondansetron (ZOFRAN) 8 MG tablet Take 1 tablet (8 mg) by mouth every 8 hours as needed for nausea Taking As Needed    oxyCODONE IR (ROXICODONE) 20 MG TABS immediate release tablet Take 20 mg by mouth every 4 hours as needed for severe pain (Chronic Pain). 3/4/2025    phenol (CHLORASEPTIC) 1.4 % spray Take 1-2 sprays (1-2 mLs) by mouth every hour as needed for sore throat. Taking As Needed    polyethylene glycol (MIRALAX) 17 GM/Dose powder Take 17 g by mouth daily as needed for constipation 3/3/2025    prochlorperazine (COMPAZINE) 5 MG tablet Take 1-2 tablets (5-10 mg) by mouth every 6 hours as needed for nausea or vomiting Taking As Needed    Skin Protectants, Misc. (INTERDRY 10\"X36\") SHEE Externally apply 10 each topically every 24 hours. Apply to skin folds on abdomen Taking    triamcinolone (KENALOG) 0.1 % external ointment Apply topically 3 times daily as needed for irritation. Taking As Needed     "

## 2025-03-06 NOTE — PROVIDER NOTIFICATION
Provider notified (name): Lawanda  Reason for notification: patient c/o pain, no PRN pain meds available, crying, yelling  Recommendation/request given to provider: please advise  Response from provider: oxycodone frequency changed to q4h

## 2025-03-06 NOTE — PLAN OF CARE
Goal Outcome Evaluation:      Plan of Care Reviewed With: patient    Overall Patient Progress: no change    VSS, on RA. Patient orientated to self and place intermittently. Is difficult to understand speech- illogical and hoarse. Patient was leaned over in bed for most of shift- even after trying to reposition patient in bed. C/o 9/10 abdominal and back pain. Declined all medications, except for oxy but was noted to be sleeping and snoring. 500mL LR bolus was given. Patient has super pubic cath in- team aware patient has not had much output from it. R knee has mepi over it and sacrum also has mepi over it WOC was in today and did dressing change. Regular diet, poor appetite. Continue with POC.

## 2025-03-06 NOTE — PLAN OF CARE
Reason for admission: UTI, weakness, confusion     VS: VSS  Pain/Nausea: c/o buttocks pain, PRN oxycodone given. Denies nausea  Neuro: alert and oriented to self, disoriented to place/time/situation  Cardiac: WNL denies chest pain  Resp: RA LSC  GI/: suprapubic cath, no BM this shift  Skin: multiple wounds, WOCN consulted, WOCN assessed and placed new mepilex, orders placed. Patient still needs triad paste under the mepilex- I ordered from [facility].  Diet: regular, 1:1 feeder, poor appetite    Activity: turn and reposition q2h.  LDA: PORT  Labs: reviewed    Plan: continue with plan of care  Goal Outcome Evaluation:      Plan of Care Reviewed With: patient    Overall Patient Progress: no changeOverall Patient Progress: no change

## 2025-03-06 NOTE — PROGRESS NOTES
"[facility]    Medicine Progress Note - Hospitalist Service, GOLD TEAM 8    Date of Admission:  3/4/2025    Assessment & Plan   \"Aranza\" Alis Hartman is a 69 year old woman admitted on 2/5/2025. 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       Changes today :  -stopped zosyn today urine cx growing [id number] mixed elisa  -gyn/onc and I spoke w/ daughter she is open to discussing about hospice w/ palliative care tomorrow. Will place consult tomorrow   For now will remain FULL code  -increased Frequency of oxycodone to 20 mg q4h prn       Pyuria   Hx of ESBL urosepsis and bacteremia  Bladder dysfunction s/p cystostomy and suprapubic catheter  Recently admitted 11/26 - 12/8/2024 for ESBL urosepsis and bacteremia requiring ICU w/ L nephrostomy tube (removed 1/7) treated w/ ertapenem, returned 1/25-1/27 for concern for UTI but no clear infection at that time.  ID was consulted that admission and recommended avoiding frequent UA and Ucx checks in future unless patient w/ symptoms of UTI. This admission increased confusion, poor appetite and increased generalized weakness, foul smelling urine. CT abdomen unremarkable. Not septic on presentation. U/A showed growing [id number] mixed elisa. U/A collected from exchanged kent in the ED 3/4  S/p unasyn (3/4-3/5)  -stop zosyn (3/5-3/6)      Metabolic Encephalopathy   On admission patient's family was concerned about her confusion. Patient has paranoia with suspected " hallucinations. Acute worsening was likely related to possible UTI v/s dehydration lack of PO. B12 FOLATE, ammonia ordered   Initially considered CT head but patient is unable to lie flat. And also has come back to baseline after fluids, pain control  If mentation worsens can consider CT head    Serous endometrial carcinoma  Follows w/ heme/onc through [facility]. S/p SNEHA, BSO 07/2024 s/p cycle 3 carbo/taxel 10/15/2024, cycle 4 delayed in s/o recent admission, family ultimately decided to forgo any further treatment.   During care conference, it was expressed that, her cancer is likely to recur due to it being an aggressive type but when it will recur is unknown. However, should it recur, GynOnc team expressed that, because of her functional status (significant weakness) and malnutrition, treatment would not be recommended since that would worsen her health overall and benefits would not outweigh the risks.  -Gyn/onc following     Chronic Pain  Acute on Chronic Pain  Patient reporting thigh, low back pain related to her coccyx wound mentioned below  Oxycodone 20 mg q4h prn oral        Recurrent hospitalizations with progressive step-wise decline  Severe Protein-Calorie Malnutrition   Advanced care planning Discussion  Sacral pressure ulcer   -Nutrition consult   -WOC consult   -palliative consult     Thrombocytopenia   Platelets around 140, likely related to infection and deconditioning   CTM            Diet: Combination Diet Regular Diet Adult  Snacks/Supplements Adult: Ensure Enlive; With Meals  Snacks/Supplements Adult: Boost Soothe; Between Meals    DVT Prophylaxis: Pneumatic Compression Devices  Shahid Catheter: Not present  Lines: PRESENT      Port a Cath 02/05/25 Single Lumen Right Chest wall-Site Assessment: WDL      Cardiac Monitoring: None  Code Status: Full Code      Clinically Significant Risk Factors        # Hypokalemia: Lowest K = 3.3 mmol/L in last 2 days, will replace as needed   # Hyperchloremia:  Highest Cl = 108 mmol/L in last 2 days, will monitor as appropriate        # Hypomagnesemia: Lowest Mg = 1.5 mg/dL in last 2 days, will replace as needed   # Hypoalbuminemia: Lowest albumin = 2.6 g/dL at 3/6/2025  5:43 AM, will monitor as appropriate     # Hypertension: Noted on problem list             # Severe Malnutrition: based on nutrition assessment and treatment provided per dietitian's recommendations., PRESENT ON ADMISSION   # Financial/Environmental Concerns:           Social Drivers of Health            Disposition Plan     Medically Ready for Discharge: Anticipated in 2-4 Days             Piter Webber MD  Hospitalist Service, GOLD TEAM 8  [facility]  Securely message with [email or web address] (more info)  Text page via [employer] Paging/Directory   See signed in provider for up to date coverage information  ______________________________________________________________________    Interval History   NAOE.    Physical Exam   Vital Signs: Temp: 97.4  F (36.3  C) Temp src: Axillary BP: 104/72 Pulse: 80   Resp: 16 SpO2: 100 % O2 Device: None (Room air)    Weight: 0 lbs 0 oz    Constitutional: awake, alert, in distress with regards to position   Respiratory: No increased work of breathing, good air exchange, clear to auscultation bilaterally, no crackles or wheezing  Cardiovascular: Normal apical impulse, regular rate and rhythm, normal S1 and S2, no S3 or S4, and no murmur noted  GI: suprapubic catheter intact     Medical Decision Making       45 MINUTES SPENT BY ME on the date of service doing chart review, history, exam, documentation & further activities per the note.      Data     I have personally reviewed the following data over the past 24 hrs:    7.6  \   7.8 (L)   / 134 (L)     138 106 24.9 (H) /  70   3.5 22 0.90 \     ALT: 19 AST: 25 AP: 105 TBILI: 0.8   ALB: 2.6 (L) TOT PROTEIN: 4.8 (L) LIPASE: N/A       Imaging results reviewed over the past 24 hrs:   No results  found for this or any previous visit (from the past 24 hours).

## 2025-03-06 NOTE — PLAN OF CARE
ED PT Deferral: Patient familiar to rehab service from prior admissions. Per recent TCU discharge, patient is essentially lift dependent and without anticipated recovery with continued PT service. At time of discharge from TCU it appears that LTC was the recommendation but patient and family elected to return home with lift and 24 hour support. Met with patient to explore role of IP PT and patient without any clear desire to work with PT. At this time, will complete orders with plan for team to reconsult PT as needed.

## 2025-03-07 LAB
ALBUMIN SERPL BCG-MCNC: 2.6 G/DL (ref 3.5–5.2)
ALBUMIN UR-MCNC: 200 MG/DL
ALP SERPL-CCNC: 112 U/L (ref 40–150)
ALT SERPL W P-5'-P-CCNC: 19 U/L (ref 0–50)
ANION GAP SERPL CALCULATED.3IONS-SCNC: 12 MMOL/L (ref 7–15)
APPEARANCE UR: ABNORMAL
AST SERPL W P-5'-P-CCNC: 24 U/L (ref 0–45)
BACTERIA #/AREA URNS HPF: ABNORMAL /HPF
BASOPHILS # BLD AUTO: 0 10E3/UL (ref 0–0.2)
BASOPHILS NFR BLD AUTO: 0 %
BILIRUB SERPL-MCNC: 0.7 MG/DL
BILIRUB UR QL STRIP: NEGATIVE
BUN SERPL-MCNC: 28.8 MG/DL (ref 8–23)
CALCIUM SERPL-MCNC: 8.5 MG/DL (ref 8.8–10.4)
CHLORIDE SERPL-SCNC: 104 MMOL/L (ref 98–107)
COLOR UR AUTO: ABNORMAL
CREAT SERPL-MCNC: 1.25 MG/DL (ref 0.51–0.95)
CREAT UR-MCNC: 68.3 MG/DL
EGFRCR SERPLBLD CKD-EPI 2021: 46 ML/MIN/1.73M2
EOSINOPHIL # BLD AUTO: 0 10E3/UL (ref 0–0.7)
EOSINOPHIL NFR BLD AUTO: 0 %
ERYTHROCYTE [DISTWIDTH] IN BLOOD BY AUTOMATED COUNT: 24.1 % (ref 10–15)
GLUCOSE SERPL-MCNC: 93 MG/DL (ref 70–99)
GLUCOSE UR STRIP-MCNC: NEGATIVE MG/DL
HCO3 SERPL-SCNC: 20 MMOL/L (ref 22–29)
HCT VFR BLD AUTO: 25.1 % (ref 35–47)
HGB BLD-MCNC: 8.3 G/DL (ref 11.7–15.7)
HGB UR QL STRIP: ABNORMAL
IMM GRANULOCYTES # BLD: 0.1 10E3/UL
IMM GRANULOCYTES NFR BLD: 1 %
KETONES UR STRIP-MCNC: ABNORMAL MG/DL
LEUKOCYTE ESTERASE UR QL STRIP: ABNORMAL
LYMPHOCYTES # BLD AUTO: 1.6 10E3/UL (ref 0.8–5.3)
LYMPHOCYTES NFR BLD AUTO: 15 %
MAGNESIUM SERPL-MCNC: 2.5 MG/DL (ref 1.7–2.3)
MAGNESIUM SERPL-MCNC: 2.8 MG/DL (ref 1.7–2.3)
MCH RBC QN AUTO: 30.7 PG (ref 26.5–33)
MCHC RBC AUTO-ENTMCNC: 33.1 G/DL (ref 31.5–36.5)
MCV RBC AUTO: 93 FL (ref 78–100)
MONOCYTES # BLD AUTO: 0.7 10E3/UL (ref 0–1.3)
MONOCYTES NFR BLD AUTO: 7 %
MUCOUS THREADS #/AREA URNS LPF: PRESENT /LPF
NEUTROPHILS # BLD AUTO: 8.1 10E3/UL (ref 1.6–8.3)
NEUTROPHILS NFR BLD AUTO: 77 %
NITRATE UR QL: NEGATIVE
NRBC # BLD AUTO: 0 10E3/UL
NRBC BLD AUTO-RTO: 0 /100
PH UR STRIP: 5.5 [PH] (ref 5–7)
PHOSPHATE SERPL-MCNC: 2.7 MG/DL (ref 2.5–4.5)
PHOSPHATE SERPL-MCNC: 2.8 MG/DL (ref 2.5–4.5)
PLATELET # BLD AUTO: 154 10E3/UL (ref 150–450)
POTASSIUM SERPL-SCNC: 3.4 MMOL/L (ref 3.4–5.3)
POTASSIUM SERPL-SCNC: 4.1 MMOL/L (ref 3.4–5.3)
PROT SERPL-MCNC: 4.8 G/DL (ref 6.4–8.3)
RBC # BLD AUTO: 2.7 10E6/UL (ref 3.8–5.2)
RBC URINE: 61 /HPF
SODIUM SERPL-SCNC: 136 MMOL/L (ref 135–145)
SODIUM UR-SCNC: 35 MMOL/L
SP GR UR STRIP: 1.03 (ref 1–1.03)
SQUAMOUS EPITHELIAL: 5 /HPF
UROBILINOGEN UR STRIP-MCNC: NORMAL MG/DL
WBC # BLD AUTO: 10.6 10E3/UL (ref 4–11)
WBC CLUMPS #/AREA URNS HPF: PRESENT /HPF
WBC URINE: >182 /HPF
YEAST #/AREA URNS HPF: ABNORMAL /HPF

## 2025-03-07 PROCEDURE — 250N000011 HC RX IP 250 OP 636: Mod: JZ

## 2025-03-07 PROCEDURE — 99233 SBSQ HOSP IP/OBS HIGH 50: CPT

## 2025-03-07 PROCEDURE — 82570 ASSAY OF URINE CREATININE: CPT

## 2025-03-07 PROCEDURE — 999N000111 HC STATISTIC OT IP EVAL DEFER

## 2025-03-07 PROCEDURE — 85041 AUTOMATED RBC COUNT: CPT

## 2025-03-07 PROCEDURE — 250N000013 HC RX MED GY IP 250 OP 250 PS 637: Performed by: INTERNAL MEDICINE

## 2025-03-07 PROCEDURE — 84100 ASSAY OF PHOSPHORUS: CPT

## 2025-03-07 PROCEDURE — 84300 ASSAY OF URINE SODIUM: CPT

## 2025-03-07 PROCEDURE — 83735 ASSAY OF MAGNESIUM: CPT

## 2025-03-07 PROCEDURE — 36592 COLLECT BLOOD FROM PICC: CPT | Performed by: STUDENT IN AN ORGANIZED HEALTH CARE EDUCATION/TRAINING PROGRAM

## 2025-03-07 PROCEDURE — 258N000003 HC RX IP 258 OP 636

## 2025-03-07 PROCEDURE — 250N000013 HC RX MED GY IP 250 OP 250 PS 637

## 2025-03-07 PROCEDURE — 36591 DRAW BLOOD OFF VENOUS DEVICE: CPT

## 2025-03-07 PROCEDURE — G0463 HOSPITAL OUTPT CLINIC VISIT: HCPCS

## 2025-03-07 PROCEDURE — 87086 URINE CULTURE/COLONY COUNT: CPT

## 2025-03-07 PROCEDURE — 84132 ASSAY OF SERUM POTASSIUM: CPT | Performed by: STUDENT IN AN ORGANIZED HEALTH CARE EDUCATION/TRAINING PROGRAM

## 2025-03-07 PROCEDURE — 81001 URINALYSIS AUTO W/SCOPE: CPT

## 2025-03-07 PROCEDURE — 99254 IP/OBS CNSLTJ NEW/EST MOD 60: CPT | Mod: GC | Performed by: INTERNAL MEDICINE

## 2025-03-07 PROCEDURE — 120N000002 HC R&B MED SURG/OB UMMC

## 2025-03-07 PROCEDURE — 80053 COMPREHEN METABOLIC PANEL: CPT

## 2025-03-07 PROCEDURE — 250N000011 HC RX IP 250 OP 636: Performed by: INTERNAL MEDICINE

## 2025-03-07 RX ORDER — POTASSIUM CHLORIDE 1.5 G/1.58G
40 POWDER, FOR SOLUTION ORAL ONCE
Status: COMPLETED | OUTPATIENT
Start: 2025-03-07 | End: 2025-03-07

## 2025-03-07 RX ORDER — OXYCODONE HYDROCHLORIDE 10 MG/1
20 TABLET ORAL
Status: DISCONTINUED | OUTPATIENT
Start: 2025-03-07 | End: 2025-03-10

## 2025-03-07 RX ORDER — SODIUM CHLORIDE, SODIUM LACTATE, POTASSIUM CHLORIDE, CALCIUM CHLORIDE 600; 310; 30; 20 MG/100ML; MG/100ML; MG/100ML; MG/100ML
INJECTION, SOLUTION INTRAVENOUS CONTINUOUS
Status: DISCONTINUED | OUTPATIENT
Start: 2025-03-07 | End: 2025-03-10

## 2025-03-07 RX ORDER — HYDROMORPHONE HYDROCHLORIDE 1 MG/ML
0.5 INJECTION, SOLUTION INTRAMUSCULAR; INTRAVENOUS; SUBCUTANEOUS
Status: DISCONTINUED | OUTPATIENT
Start: 2025-03-07 | End: 2025-03-07 | Stop reason: DRUGHIGH

## 2025-03-07 RX ORDER — HYDROMORPHONE HYDROCHLORIDE 1 MG/ML
0.5 INJECTION, SOLUTION INTRAMUSCULAR; INTRAVENOUS; SUBCUTANEOUS EVERY 4 HOURS PRN
Status: DISCONTINUED | OUTPATIENT
Start: 2025-03-07 | End: 2025-03-09

## 2025-03-07 RX ORDER — POTASSIUM CHLORIDE 750 MG/1
40 TABLET, EXTENDED RELEASE ORAL ONCE
Status: DISCONTINUED | OUTPATIENT
Start: 2025-03-07 | End: 2025-03-07

## 2025-03-07 RX ADMIN — OXYCODONE HYDROCHLORIDE 20 MG: 10 TABLET ORAL at 04:11

## 2025-03-07 RX ADMIN — ACETAMINOPHEN 975 MG: 325 TABLET, FILM COATED ORAL at 15:44

## 2025-03-07 RX ADMIN — ENOXAPARIN SODIUM 40 MG: 40 INJECTION SUBCUTANEOUS at 08:18

## 2025-03-07 RX ADMIN — Medication 1 SPRAY: at 15:45

## 2025-03-07 RX ADMIN — Medication 1 SPRAY: at 12:16

## 2025-03-07 RX ADMIN — POTASSIUM CHLORIDE 40 MEQ: 1.5 POWDER, FOR SOLUTION ORAL at 08:42

## 2025-03-07 RX ADMIN — OXYCODONE HYDROCHLORIDE 20 MG: 10 TABLET ORAL at 15:44

## 2025-03-07 RX ADMIN — HYDROMORPHONE HYDROCHLORIDE 0.5 MG: 1 INJECTION, SOLUTION INTRAMUSCULAR; INTRAVENOUS; SUBCUTANEOUS at 18:23

## 2025-03-07 RX ADMIN — SODIUM CHLORIDE, POTASSIUM CHLORIDE, SODIUM LACTATE AND CALCIUM CHLORIDE: 600; 310; 30; 20 INJECTION, SOLUTION INTRAVENOUS at 18:17

## 2025-03-07 RX ADMIN — ACETAMINOPHEN 975 MG: 325 TABLET, FILM COATED ORAL at 23:54

## 2025-03-07 RX ADMIN — ACETAMINOPHEN 975 MG: 325 TABLET, FILM COATED ORAL at 08:17

## 2025-03-07 RX ADMIN — OXYCODONE HYDROCHLORIDE 20 MG: 10 TABLET ORAL at 12:16

## 2025-03-07 RX ADMIN — Medication 1 SPRAY: at 08:18

## 2025-03-07 RX ADMIN — Medication 1 MG: at 04:11

## 2025-03-07 RX ADMIN — OXYCODONE HYDROCHLORIDE 20 MG: 10 TABLET ORAL at 08:17

## 2025-03-07 RX ADMIN — OXYCODONE HYDROCHLORIDE 20 MG: 10 TABLET ORAL at 20:08

## 2025-03-07 RX ADMIN — SODIUM CHLORIDE, SODIUM LACTATE, POTASSIUM CHLORIDE, AND CALCIUM CHLORIDE 1000 ML: .6; .31; .03; .02 INJECTION, SOLUTION INTRAVENOUS at 15:44

## 2025-03-07 ASSESSMENT — ACTIVITIES OF DAILY LIVING (ADL)
ADLS_ACUITY_SCORE: 76
ADLS_ACUITY_SCORE: 78
ADLS_ACUITY_SCORE: 78
ADLS_ACUITY_SCORE: 76
ADLS_ACUITY_SCORE: 78
ADLS_ACUITY_SCORE: 78
ADLS_ACUITY_SCORE: 76
ADLS_ACUITY_SCORE: 78
ADLS_ACUITY_SCORE: 76
ADLS_ACUITY_SCORE: 76
DEPENDENT_IADLS:: CLEANING;COOKING;LAUNDRY;SHOPPING;MEAL PREPARATION;MEDICATION MANAGEMENT;TRANSPORTATION;INCONTINENCE
ADLS_ACUITY_SCORE: 78
ADLS_ACUITY_SCORE: 78
ADLS_ACUITY_SCORE: 76
ADLS_ACUITY_SCORE: 78
ADLS_ACUITY_SCORE: 78
ADLS_ACUITY_SCORE: 76
ADLS_ACUITY_SCORE: 75
ADLS_ACUITY_SCORE: 76
ADLS_ACUITY_SCORE: 76

## 2025-03-07 NOTE — PROGRESS NOTES
"[facility]    Medicine Progress Note - Hospitalist Service, GOLD TEAM 8    Date of Admission:  3/4/2025    Assessment & Plan   \"Aranza\" Alis Hartman is a 69 year old woman admitted on 2/5/2025. 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       Changes today :  -care conference conducted today, pl see palliative note for details  No major decision made today. She would like to talk to her cousin who is a nurse about GOC and will get to us when she wants to discuss more   -increased Frequency of oxycodone to 20 mg q3h prn     MADHU  Creatinine at baseline 0.8-0.9  Now at 1.25, likely prerenal v/s ATN v/s postrenal   Ordered bladder scan, urine labs > prerenal   LR 1L given   LR @ 100 ml/hr for maintenance     Pyuria   Hx of ESBL urosepsis and bacteremia  Bladder dysfunction s/p cystostomy and suprapubic catheter  Recently admitted 11/26 - 12/8/2024 for ESBL urosepsis and bacteremia requiring ICU w/ L nephrostomy tube (removed 1/7) treated w/ ertapenem, returned 1/25-1/27 for concern for UTI but no clear infection at that time.  ID was consulted that admission and recommended avoiding frequent UA and Ucx checks in future unless patient w/ symptoms of UTI. This admission increased confusion, poor appetite and increased generalized weakness, foul smelling urine. CT abdomen unremarkable. Not septic on presentation. U/A showed growing [id number] mixed elisa. U/A collected from exchanged kent in the ED 3/4  S/p unasyn " (3/4-3/5)  -stop zosyn (3/5-3/6)      Metabolic Encephalopathy   On admission patient's family was concerned about her confusion. Patient has paranoia with suspected hallucinations. Acute worsening was likely related to possible UTI v/s dehydration lack of PO. B12 FOLATE, ammonia ordered   Initially considered CT head but patient is unable to lie flat. And also has come back to baseline after fluids, pain control  If mentation worsens can consider CT head    Serous endometrial carcinoma  Follows w/ heme/onc through [place]. S/p SNEHA, BSO 07/2024 s/p cycle 3 carbo/taxel 10/15/2024, cycle 4 delayed in s/o recent admission, family ultimately decided to forgo any further treatment.   During care conference, it was expressed that, her cancer is likely to recur due to it being an aggressive type but when it will recur is unknown. However, should it recur, GynOnc team expressed that, because of her functional status (significant weakness) and malnutrition, treatment would not be recommended since that would worsen her health overall and benefits would not outweigh the risks.  -Gyn/onc following     Chronic Pain  Acute on Chronic Pain  Patient reporting thigh, low back pain related to her coccyx wound mentioned below  Oxycodone 20 mg q4h prn oral  IV dilaudid 0.5 mg Q4H prn for breakthrough pain     Goals of care   Recurrent hospitalizations with progressive step-wise decline  Severe Protein-Calorie Malnutrition   Advanced care planning Discussion  Sacral pressure ulcer   -Nutrition consult   -WOC consult   -palliative consult     Thrombocytopenia (resolved)  Platelets around 140, likely related to infection and deconditioning   CTM            Diet: Combination Diet Regular Diet Adult  Snacks/Supplements Adult: Ensure Enlive; With Meals  Snacks/Supplements Adult: Boost Soothe; Between Meals    DVT Prophylaxis: Enoxaparin (Lovenox) SQ  Shahid Catheter: Not present  Lines: PRESENT      Port a Cath 02/05/25 Single Lumen Right  Chest wall-Site Assessment: WDL      Cardiac Monitoring: None  Code Status: Full Code      Clinically Significant Risk Factors        # Hypokalemia: Lowest K = 3.3 mmol/L in last 2 days, will replace as needed      # Hypomagnesemia: Lowest Mg = 1.5 mg/dL in last 2 days, will replace as needed   # Hypoalbuminemia: Lowest albumin = 2.6 g/dL at 3/7/2025  6:40 AM, will monitor as appropriate     # Hypertension: Noted on problem list             # Severe Malnutrition: based on nutrition assessment and treatment provided per dietitian's recommendations., PRESENT ON ADMISSION   # Financial/Environmental Concerns:           Social Drivers of Health            Disposition Plan     Medically Ready for Discharge: Anticipated in 2-4 Days             Piter Webber MD  Hospitalist Service, GOLD TEAM 8  [facility]  Securely message with [employer] (more info)  Text page via [employer] Paging/Directory   See signed in provider for up to date coverage information  ______________________________________________________________________    Interval History   NAOE    Physical Exam   Vital Signs: Temp: 97  F (36.1  C) Temp src: Oral BP: (!) 118/102 Pulse: 94   Resp: 15 SpO2: 100 % O2 Device: None (Room air)    Weight: 134 lbs 14.74 oz    Constitutional: awake, in distress with regards to position, oriented x3  Respiratory: No increased work of breathing, good air exchange, clear to auscultation bilaterally, no crackles or wheezing  Cardiovascular: Normal apical impulse, regular rate and rhythm, normal S1 and S2, no S3 or S4, and no murmur noted  GI: suprapubic catheter intact     Medical Decision Making       50 MINUTES SPENT BY ME on the date of service doing chart review, history, exam, documentation & further activities per the note.      Data     I have personally reviewed the following data over the past 24 hrs:    10.6  \   8.3 (L)   / 154     136 104 28.8 (H) /  93   4.1 20 (L) 1.25 (H) \      ALT: 19 AST: 24 AP: 112 TBILI: 0.7   ALB: 2.6 (L) TOT PROTEIN: 4.8 (L) LIPASE: N/A       Imaging results reviewed over the past 24 hrs:   No results found for this or any previous visit (from the past 24 hours).

## 2025-03-07 NOTE — PROGRESS NOTES
Neuro: Oriented to self, sometimes oriented to location  Cardiac: VSS   Respiratory: Sating 98 on 2L nasal canula when sleeping, 100% RA while awake.  GI/: Suprapubic catheter with low UO, MD and palliative aware, no BM this shift  Diet/appetite: Poor appetite, encouraging intake of ensure with hourly rounding.  Activity: Q2 turn and reposition, refused turning through out shift, pt unable to lay back, sitting upright and occasionally requesting to be shifted backwards but leans forward right away.  Pain: Q3 Oxycodone, see MAR  Skin: WOC consult today, see WOC RN note and new orders  LDA's: L chest port with 1000 ml LR bolus, LR @ 100 to start after    Plan: Repositioning, wound care, pain control

## 2025-03-07 NOTE — PLAN OF CARE
Occupational Therapy: Orders received. Chart reviewed and discussed with physical therapy.? Occupational Therapy not indicated due to lift dependent for transfers, receiving cares from family at baseline, all DME needs met (per care mgmt note 2/27 has wheelchair, santo lift, hospital bed, and commode) No skilled IP OT needs at this time.? Defer discharge recommendations to medical team.? Will complete orders.

## 2025-03-07 NOTE — CONSULTS
"  Palliative Care Consultation Note  [facility]    Patient: Alis Hartman  Date of Admission:  3/4/2025    Requesting Clinician / Team: Medicine (Dr. Webber)  Reason for consult: Symptom management  Goals of care  Patient and family support     Recommendations & Counseling     GOALS OF CARE:   Restorative without limits :We discussed general treatment options (full/restorative and comfort only/hospice). She had a conversation about what hospice entails with Dr. Perez yesterday as well.  Joy expresses that she is overwhelmed by the situation and the idea of her mother dying.  She wants to discuss the situation with her cousin who is a nurse. She was planning to go reach out to her immediately after our discussion. She does say that it is important to minimize her mother's suffering.     ADVANCE CARE PLANNING:  Previously completed HCD naming Aranza (daughter) as HCA, appears this was not dated and deemed \"invalid.\" Aranza confirmed during last admission that Joy is her surrogate decision maker per notes in chart.   There is a POLST form on file, this was reviewed and current.  Code status: Full Code    MEDICAL MANAGEMENT:   #Pain, chronic, low back and abdominal after surgery in July.   #Pain, L thigh  #Pain, sacral/coccyx wound and B arm and leg wounds  #Sedation  It is difficult to assess Aranza's pain in the setting of acute delirium. She has moments where she appears to be in pain alternating with moments where she appears comfortable.   - Acetaminophen 975mg q8hr  - Oxycodone 20mg q4hr prn --> increased to q3hr prn  - Add hydromorphone 0.5mg q2 hr prn for pain not controlled with PO meds. Recommend having a low threshold to increase to 1mg if pain not controlled and patient is tolerating dose   - Patient may ultimately benefit from long acting pain relief how in the setting of acute delirium would avoid starting anything long acting at this " time    #Anxiety, Generalized Anxiety   #Paranoid schizophrenia  - Marilyn has previously been hesitant to start mental health medications for Aranza in the past. She emphasizes again today that she does not want her mother on psychiatric medications without talking to her first (and does not want any at this time).     PSYCHOSOCIAL/SPIRITUAL SUPPORT:  Family: Daughter (Joy) and grandchildren     Palliative Care will continue to follow. Please reach out over the weekend if any needs arise or if a care conference is planned. Thank you for the consult and allowing us to aid in the care of Alis Hartman.    These recommendations have been discussed with primary team.    Patient was seen and examined with Dr. Chavarria.    Veronica Kahn MD, PharmD  PGY3 Resident  [facility], Palliative Care  Securely message with the [employer]oc[employer] Web Console (learn more here) or  Text page via [facility] Paging/Directory     Assessment      Alis Hartman is a 69 year old female with a past medical history of cervical cancer s/p radiation, seriuos adenocarcinoma s/p SNEHA/BSO and cystotomy w/ suprapubic catheter placement 07/2024 and 3 cycles of carbo/taxol (10/2024), hx ESBL urosepsis and bacteremia, RNY gastric bypass, afib on apixaban, HTN, CKD stage 3, and multiple recent admissions (most recently 2/5-2/25 with MADHU, deconditioning, and malnutrition). She is now admitted with concern for UTI and failure to thrive/general weakness.     The patient's daughter Joy met with Dr. Perez of gyn-onc yesterday and goals of care were discussed. She discussed hospice and palliative care has been consulted to continue goals of care discussion, including discussion of hospice and pain management.     Today, the patient was seen for:  Goal of care  Family support  Symptom management  Palliative encounter    History of Present Illness   Met Aranza and her daughter Joy.     I introduced our role as an extra layer of support and how we help  patients and families dealing with serious, potentially life-limiting illnesses. Palliative care helps patients and families navigate their care while focusing on the whole person.  Palliative care often assists with effective communication and caring support.     Prognosis, Goals, & Planning:   Functional Status just prior to this current hospitalization:  Patient is demonstrating increased frailty. Aranza has been hospitalized at least 5 times in the past 4 months for recurrent episodes of deconditioning and urosepsis. There has been a decline in daily function including requiring a lift for tranfers.  There is a documented unitentional weight loss of 10.4kg (15%) over the past ~1 month.    Prognosis, Goals, and/or Advance Care Planning:  We discussed general treatment options (full/restorative, selective/conservatives, and comfort only/hospice). She had a conversation about what hospice entails with Dr. Perez yesterday as well.  Joy expresses that she is overwhelmed by the situation and the idea of her mother dying.  She wants to minimize her suffering and is concerned that she's not eating or drinking. She wants to discuss the situation with her cousin who is a nurse. She was planning to go reach out to her immediately after our discussion and will let Aranza's nurse know once she's ready to talk again.    Code Status was addressed today:   Yes, We started to discuss potential risks and rationale of attempting cardiac resuscitation and the probability of survival as well as quality of life implications.  At this time, Joy would like her mother to remain full code but will continue to think about it.     Patient's decision making preferences: Patient's daughter would like to make decisions with shared with support from loved ones (specifically her cousin)        Patient has decision-making capacity today for complex decisions: No. Patient does not have decision making capacity at this time.            Coping, Meaning, & Spirituality:   Mood, coping, and/or meaning in the context of serious illness were addressed today: Yes    Social:   Living situation: lives with family (Joy's son and daughter, both adults)  Important relationships/caregivers: Joy and her adultchildren    Medications:  Reviewed this patient's medication profile and medications from this hospitalization. Notable medications:  Acetaminophen 975mg q8hr   Oxycodone 20mg q4hr prn    [place] Board of Pharmacy Data Base Reviewed: No    ROS:  Comprehensive ROS is reviewed and is negative except as here & per HPI:     Physical Exam   Vital Signs with Ranges  Temp:  [97  F (36.1  C)-97.6  F (36.4  C)] 97  F (36.1  C)  Pulse:  [86-94] 86  Resp:  [15-16] 15  BP: ()/() 98/67  SpO2:  [92 %-100 %] 94 %  Wt Readings from Last 10 Encounters:   03/06/25 61.2 kg (134 lb 14.7 oz)   02/20/25 60.4 kg (133 lb 2.5 oz)   01/25/25 70.8 kg (156 lb)   01/14/25 70.8 kg (156 lb)   01/07/25 70.8 kg (156 lb)   12/30/24 66.2 kg (146 lb)   12/12/24 85.3 kg (188 lb)   12/08/24 85.5 kg (188 lb 7.9 oz)   11/09/24 63.2 kg (139 lb 5.3 oz)   11/04/24 63.5 kg (140 lb)     134 lbs 14.74 oz    PHYSICAL EXAM:  Gen: Sitting in bed, chronically ill appearing  Neuro: Awake, disoriented. Moving all extremities.   Pulm: nonlabored breathing, comfortable on room air (nasal canula laying in the bed), no cough  CV: RRR by peripheral pulse, noncyanotic   ABD: Unable to examine  MSK:  BLE edema  Skin: Warm, dry, no rashes or abrasions on exposed skin. Chronic wounds not exposed  Psych: Anxious    Data reviewed:  Results for orders placed or performed during the hospital encounter of 03/04/25 (from the past 24 hours)   Potassium   Result Value Ref Range    Potassium 3.5 3.4 - 5.3 mmol/L   Vitamin B12   Result Value Ref Range    Vitamin B12 1,186 232 - 1,245 pg/mL   Magnesium   Result Value Ref Range    Magnesium 2.8 (H) 1.7 - 2.3 mg/dL   Magnesium   Result Value Ref Range     Magnesium 2.7 (H) 1.7 - 2.3 mg/dL   Ammonia   Result Value Ref Range    Ammonia 37 11 - 51 umol/L   Phosphorus   Result Value Ref Range    Phosphorus 2.8 2.5 - 4.5 mg/dL   Folate   Result Value Ref Range    Folic Acid 17.5 4.6 - 34.8 ng/mL   CBC with Platelets & Differential    Narrative    The following orders were created for panel order CBC with Platelets & Differential.  Procedure                               Abnormality         Status                     ---------                               -----------         ------                     CBC with platelets and ...[[id number]]  Abnormal            Final result                 Please view results for these tests on the individual orders.   Comprehensive metabolic panel   Result Value Ref Range    Sodium 136 135 - 145 mmol/L    Potassium 3.4 3.4 - 5.3 mmol/L    Carbon Dioxide (CO2) 20 (L) 22 - 29 mmol/L    Anion Gap 12 7 - 15 mmol/L    Urea Nitrogen 28.8 (H) 8.0 - 23.0 mg/dL    Creatinine 1.25 (H) 0.51 - 0.95 mg/dL    GFR Estimate 46 (L) >60 mL/min/1.73m2    Calcium 8.5 (L) 8.8 - 10.4 mg/dL    Chloride 104 98 - 107 mmol/L    Glucose 93 70 - 99 mg/dL    Alkaline Phosphatase 112 40 - 150 U/L    AST 24 0 - 45 U/L    ALT 19 0 - 50 U/L    Protein Total 4.8 (L) 6.4 - 8.3 g/dL    Albumin 2.6 (L) 3.5 - 5.2 g/dL    Bilirubin Total 0.7 <=1.2 mg/dL   Magnesium   Result Value Ref Range    Magnesium 2.5 (H) 1.7 - 2.3 mg/dL   Phosphorus   Result Value Ref Range    Phosphorus 2.7 2.5 - 4.5 mg/dL   CBC with platelets and differential   Result Value Ref Range    WBC Count 10.6 4.0 - 11.0 10e3/uL    RBC Count 2.70 (L) 3.80 - 5.20 10e6/uL    Hemoglobin 8.3 (L) 11.7 - 15.7 g/dL    Hematocrit 25.1 (L) 35.0 - 47.0 %    MCV 93 78 - 100 fL    MCH 30.7 26.5 - 33.0 pg    MCHC 33.1 31.5 - 36.5 g/dL    RDW 24.1 (H) 10.0 - 15.0 %    Platelet Count 154 150 - 450 10e3/uL    % Neutrophils 77 %    % Lymphocytes 15 %    % Monocytes 7 %    % Eosinophils 0 %    % Basophils 0 %    % Immature  Granulocytes 1 %    NRBCs per 100 WBC 0 <1 /100    Absolute Neutrophils 8.1 1.6 - 8.3 10e3/uL    Absolute Lymphocytes 1.6 0.8 - 5.3 10e3/uL    Absolute Monocytes 0.7 0.0 - 1.3 10e3/uL    Absolute Eosinophils 0.0 0.0 - 0.7 10e3/uL    Absolute Basophils 0.0 0.0 - 0.2 10e3/uL    Absolute Immature Granulocytes 0.1 <=0.4 10e3/uL    Absolute NRBCs 0.0 10e3/uL   Sodium random urine   Result Value Ref Range    Sodium Urine mmol/L 35 mmol/L   Creatinine random urine   Result Value Ref Range    Creatinine Urine mg/dL 68.3 mg/dL   UA with Microscopic reflex to Culture    Specimen: Urine, Suprapubic   Result Value Ref Range    Color Urine Orange (A) Colorless, Straw, Light Yellow, Yellow    Appearance Urine Cloudy (A) Clear    Glucose Urine Negative Negative mg/dL    Bilirubin Urine Negative Negative    Ketones Urine Trace (A) Negative mg/dL    Specific Gravity Urine 1.026 1.003 - 1.035    Blood Urine Large (A) Negative    pH Urine 5.5 5.0 - 7.0    Protein Albumin Urine 200 (A) Negative mg/dL    Urobilinogen Urine Normal Normal, 2.0 mg/dL    Nitrite Urine Negative Negative    Leukocyte Esterase Urine Large (A) Negative    Bacteria Urine Few (A) None Seen /HPF    WBC Clumps Urine Present (A) None Seen /HPF    Budding Yeast Urine Few (A) None Seen /HPF    Mucus Urine Present (A) None Seen /LPF    RBC Urine 61 (H) <=2 /HPF    WBC Urine >182 (H) <=5 /HPF    Squamous Epithelials Urine 5 (H) <=1 /HPF    Narrative    Urine Culture ordered based on laboratory criteria     No results found for this or any previous visit (from the past 24 hours).    Medical Decision Making       SUPPLEMENTAL HISTORY, in addition to the patient's history, over the past 24 hours obtained from:   - Adult daughter

## 2025-03-07 NOTE — PLAN OF CARE
Occupational Therapy: Orders received. Chart reviewed and discussed with physical therapy.? Occupational Therapy not indicated due to lift dependent for transfers, receiving cares from family at baseline, all DME needs met (per care mgmt note discharge note from 2/27 has wheelchair, santo lift, hospital bed, and commode) No skilled IP OT needs at this time.? Defer discharge recommendations to medical team.? Will complete orders.

## 2025-03-07 NOTE — CONSULTS
Care Management Initial Consult    General Information  Assessment completed with: VM-chart review, Children, Family,    Type of CM/SW Visit: Progression of Care    Primary Care Provider verified and updated as needed:     Readmission within the last 30 days: previous discharge plan unsuccessful   Return Category: Exacerbation of disease  Reason for Consult: discharge planning, other (see comments) (elevated risk score)  Advance Care Planning: Advance Care Planning Reviewed: present on chart (has POLST; other ACP doc is invalid d/t missing date)  hospice being considered       Communication Assessment  Patient's communication style: spoken language (English or Bilingual)             Cognitive  Cognitive/Neuro/Behavioral: .WDL except, speech, orientation  Level of Consciousness: confused  Arousal Level: opens eyes spontaneously  Orientation: disoriented to, time, situation, place  Mood/Behavior: anxious, cooperative  Best Language: 0 - No aphasia  Speech: spontaneous    Living Environment:   People in home: grandchild(vernell), other (see comments) (grandson, granddtr, niece; dtr visits daily but does not live in the home; other family visit frequently)     Current living Arrangements: house (duplex)      Able to return to prior arrangements: other (see comments) (TBD)       Family/Social Support:  Care provided by: child(vernell), other (see comments) (grandson, granddtr, niece)  Provides care for: no one, unable/limited ability to care for self  Marital Status: Single  Support system: Children (grandchildren)          Description of Support System: Supportive    Support Assessment: Caregiver experiencing high stress, Caregiver difficulty providing physical care/safety    Current Resources:   Patient receiving home care services: Yes ([facility]  Skilled Home Care Services: Skilled Nursing, Physical Therapy, Occupational Therapy     Community Resources: DME, Home Care, OP Mental  Health  Equipment currently used at home: wheelchair, manual, walker, rolling, shower chair (4WW)  Supplies currently used at home: Incontinence Supplies    Employment/Financial:  Employment Status: retired        Financial Concerns: none   Referral to Financial Worker: No       Does the patient's insurance plan have a 3 day qualifying hospital stay waiver?  No    Lifestyle & Psychosocial Needs:  Social Drivers of Health     Food Insecurity: Low Risk  (2/5/2025)    Food Insecurity     Within the past 12 months, did you worry that your food would run out before you got money to buy more?: No     Within the past 12 months, did the food you bought just not last and you didn t have money to get more?: No   Depression: Not at risk (9/4/2024)    PHQ-2     PHQ-2 Score: 0   Housing Stability: Low Risk  (2/5/2025)    Housing Stability     Do you have housing? : Yes     Are you worried about losing your housing?: No   Tobacco Use: Low Risk  (1/31/2025)    Received [facility]    Patient History     Smoking Tobacco Use: Never     Smokeless Tobacco Use: Never     Passive Exposure: Not on file   Financial Resource Strain: Low Risk  (2/5/2025)    Financial Resource Strain     Within the past 12 months, have you or your family members you live with been unable to get utilities (heat, electricity) when it was really needed?: No   Alcohol Use: Not on file   Transportation Needs: Low Risk  (2/5/2025)    Transportation Needs     Within the past 12 months, has lack of transportation kept you from medical appointments, getting your medicines, non-medical meetings or appointments, work, or from getting things that you need?: No   Physical Activity: Not on file   Interpersonal Safety: Low Risk  (2/5/2025)    Interpersonal Safety     Do you feel physically and emotionally safe where you currently live?: Yes     Within the past 12 months, have you been hit, slapped, kicked or otherwise physically hurt by someone?: No     Within  the past 12 months, have you been humiliated or emotionally abused in other ways by your partner or ex-partner?: No   Stress: Not on file   Social Connections: Not on file   Health Literacy: Not on file       Functional Status:  Prior to admission patient needed assistance:   Dependent ADLs:: Ambulation-walker, Bathing, Dressing, Positioning, Transfers, Incontinence (pt has been requiring more assist than usual following previous hospital discharge)  Dependent IADLs:: Cleaning, Cooking, Laundry, Shopping, Meal Preparation, Medication Management, Transportation, Incontinence (pt has been requiring more assist than usual following previous hospital discharge)  Assesssment of Functional Status: Has complex medical needs, requires placement in a facility    Mental Health Status:  Mental Health Status: Other (see comment)  Mental Health Management:  (pt orientation compromised)    Chemical Dependency Status:  Chemical Dependency Status: No Current Concerns             Values/Beliefs:  Spiritual, Cultural Beliefs, Christian Practices, Values that affect care: no               Discussed  Partnership in Safe Discharge Planning  document with patient/family: Yes    Additional Information:  BANDAR completed review of EMR and also met with pt dtr during care conference to complete consult. Pt had previous admission in last couple months. During pt last admission she had discharged home w/ home care and returned to the hospital a few days later with worsening symptoms. Pt dtr had been concerned with decreases eating and drinking and how this would contribute to pt progress. Care team held care conference and discussed pt progress. Pt dtr requested follow up after she had a chance to consult with family and fernandes more support in healthcare goals.     BANDAR will continue to follow for discharge planning.    Next Steps: follow for discharge planning    BRIANNA Heller LSW   3/8/2025       Social  Work and Care Management Department       SEARCHABLE in [facility] - search SOCIAL WORK       [place] (0800 - 1630) Saturday and Sunday     Units: 4A Vocera, 4C Vocera, & 4E Vocera        Units: 5A 9364-4721 Vocera, 5A 4936-2300 Vocera , BMT SW 1 BMT SW 2, BMT SW 3 & BMT SW 4  5C Off Service 5401 - 5416  5C Off Service [phone]     Units: 6A Vocera & 6B Vocera      Units: 6C Vocera     Units: 7A Vocera & 7B Vocera      Units: 7C Med Surg 7401 thru 7418 and 7C Med Surg 7502 thru 7521      Unit: [place] ED Vocera & [place] Obs Vocera     [facility] (7551-6755) Saturday and Sunday      Units: 5 Ortho Vocera, 5 Med Surg Vocera & WB ED Vocera     Units: 6 Med Surg Vocera, 8 Med Surg Vocera, & 10 ICU Vocera      After hours Vocera [facility] and After Hours Vocera [place]     Please NOTE changes to times below:    **Saturday & Sunday (1630 - 2030)    **Mon-Fri (3714-3458)     **[facility] Recognized Holidays  (9599-0447)    Units: ALL   - see above VOCERA links to units

## 2025-03-07 NOTE — PLAN OF CARE
"RN Cdd-xg-Zcnol care note: [id number]                               *For vital signs and complete assessments, please see documentation flowsheets.*    Contact precaution:MRSA, ESBL    Neuro:  Alert and oriented x3. Incoherent at times. Confused.  Cardiac: Monitor shows sinus rhythm. Vital signs stable.   Respiratory: Oxygen saturation >95% on room air.  GI: No report of nausea or vomiting. On regular diet.   : Urine output via: kent.  Activity: Lift, not OOB.  Pain: At acceptable level on current regimen. PRN oxycodone given.   Skin: No new skin deficits noted. Multiple leg wounds, CDI dressings.  LDAs:R Port-A-Cath: saline locked.  Sleep: Patient did not sleep well throughout the night.  Plan: Please continue with patient care plan & notify the primary team with any changes to patient condition.      Problem: Adult Inpatient Plan of Care  Goal: Plan of Care Review  Description: The Plan of Care Review/Shift note should be completed every shift.  The Outcome Evaluation is a brief statement about your assessment that the patient is improving, declining, or no change.  This information will be displayed automatically on your shift  note.  Outcome: Progressing  Goal: Patient-Specific Goal (Individualized)  Description: You can add care plan individualizations to a care plan. Examples of Individualization might be:  \"Parent requests to be called daily at 9am for status\", \"I have a hard time hearing out of my right ear\", or \"Do not touch me to wake me up as it startles  me\".  Outcome: Progressing  Goal: Absence of Hospital-Acquired Illness or Injury  Outcome: Progressing  Intervention: Identify and Manage Fall Risk  Recent Flowsheet Documentation  Taken 3/7/2025 0400 by Melissa French, RN  Safety Promotion/Fall Prevention:   safety round/check completed   room organization consistent   room near nurse's station  Taken 3/7/2025 0045 by Melissa French, RN  Safety Promotion/Fall Prevention:   safety round/check " completed   room organization consistent   room near nurse's station  Intervention: Prevent Skin Injury  Recent Flowsheet Documentation  Taken 3/7/2025 0427 by Melissa French, RN  Body Position:   turned   right  Taken 3/7/2025 0045 by Melissa French, RN  Body Position: weight shifting  Intervention: Prevent Infection  Recent Flowsheet Documentation  Taken 3/7/2025 0400 by Melissa French, RN  Infection Prevention:   visitors restricted/screened   single patient room provided   rest/sleep promoted   personal protective equipment utilized  Taken 3/7/2025 0045 by Melissa French, RN  Infection Prevention:   visitors restricted/screened   single patient room provided   rest/sleep promoted   personal protective equipment utilized  Goal: Optimal Comfort and Wellbeing  Outcome: Not Progressing  Intervention: Provide Person-Centered Care  Recent Flowsheet Documentation  Taken 3/7/2025 0045 by Melissa French, RN  Trust Relationship/Rapport:   empathic listening provided   emotional support provided  Goal: Readiness for Transition of Care  Outcome: Progressing

## 2025-03-07 NOTE — CONSULTS
"[facility] Nurse Inpatient Assessment     Consulted for: coccyx  3/7: New consult for wounds to bilateral legs    Summary: known by [facility] from previous admission     WO nurse follow-up plan: weekly    Patient History (according to provider note(s):      The patient is a 69 year old female, with an extensive PMHx which is nicely documented in Dr. Khan and Mis's note from the ER.  Per their notes:  Ms. 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  The patient reportedly was feeling sick and her \"caretakers\" wanted her seen by a physician.  Apparently she has not been feeling well since her last discharge from the hospital with increased confusion, poor appetite and increased generalized weakness which now she is clearly confined to her bed because of this. Her urine output(UOP) has been foul smelling and lower amounts.  The patient refused to come to the ER 2 days ago.  The patient denies any other complaints but again has a difficult time offering a cogent history as to how she is feeling and what she's complaining of.  The remainder of the history of present illness is limited given the patient's altered mental status    Assessment:      Areas visualized during today's visit: Focused: and Lower extremities     Wound location: Bilateral legs    Left medial lower leg      Left posterior thigh      Right Calf      Right hip      Last photo: 3/7  Wound due to:  Edema blisters  Wound history/plan of care: Pt had large edema blisters on a previous admission that unroofed. Large wound on left medial leg 50% epithelialized.  Wound base: 50 % Dermis, 50 % Slough     Palpation of the wound bed: " normal      Drainage: moderate     Description of drainage: serous     Measurements (length x width x depth, in cm): See photos     Tunneling: N/A     Undermining: N/A  Periwound skin: Intact, Ecchymosis, and Edematous - there is purpe mottling scattered around bilateral legs      Color: normal and consistent with surrounding tissue      Temperature: normal   Odor: none  Pain: moderate and during dressing change, aching  Pain interventions prior to dressing change: slow and gentle cares   Treatment goal: Heal  and Protection  STATUS: initial assessment  Supplies ordered: at bedside     Pressure Injury Location: Sacrum/coccyx - not assessed 3/7                           2/18    Last photo: 3/6  Wound type: Pressure Injury and Shear     Pressure Injury Stage: site of previously healed pressure injury, present on admission     Wound history/plan of care:   unknown worst stage, currently appears to be a stage 2 but was possibly deeper. Was noted as a reinjury on her last assessment here 12/4/24 and 1/27/25  Wound base: 100 % Fibrin of open area with purple intact around.      Palpation of the wound bed: normal      Drainage: small     Description of drainage: none     Measurements (length x width x depth, in cm) total area 7 x 4 x 0.1 cm   Periwound skin: Intact and Erythema- blanchable      Color: normal and consistent with surrounding tissue      Temperature: normal   Odor: none  Pain: moderate, tender  Pain intervention prior to dressing change: slow and gentle cares   Treatment goal: Heal   STATUS: initial assessment  Supplies ordered: discussed with RN and discussed with patient       Treatment Plan:     sacrum wound(s): Every other day and PRN cleanse with wound cleanser and pat dry. Paint quang wound skin with no sting. Apply Triad paste(order#[id number]) over open wound and cover with sacral mepilex. AVOID supine position if able.     Bilateral lower legs wounds: Every other day: remove dressings and wash wounds with  "Vashe and gauze. Pat dry. Cover open areas with Vaseline gauze and secure with large Mepilex or ABD and stretch netting. If using Mepilex, note patient has very delicate skin so care should be taken to prevent worsening skin tears when removing.     Pressure Injury Prevention (PIP) Plan:  If patient is declining pressure injury prevention interventions: Explore reason why and address patient's concerns, Educate on pressure injury risk and prevention intervention(s), If patient is still declining, document \"informed refusal\" , and Ensure Care team is aware ( provider, charge nurse, etc)  Mattress: Follow bed algorithm, add Low Air Loss (Air+) mattress pump if skin is very moist or constantly moist.   HOB: Maintain at or below 30 degrees, unless contraindicated  Repositioning in bed: Every 1-2 hours , Left/right positioning; avoid supine, Raise foot of bed prior to raising head of bed, to reduce patient sliding down (shear), and Frequent microturns using positioning wedges, as patient tolerates  Heels: Pillows under calves  Protective Dressing: Sacral Mepilex for prevention (#789242),  especially for the agitated patient   Positioning Equipment:None  Chair positioning: Chair cushion (#982026) , Assist patient to reposition hourly, and Do NOT use a donut for sitting (this increases pressure to smaller area and creates a higher potential for injury)    If patient has a buttock pressure injury, or high risk for PI use chair cushion or SPS.  Moisture Management: Perineal cleansing /protection: Follow Incontinence Protocol, Avoid brief in bed, Clean and dry skin folds with bathing , Consider InterDry (#112986) between folds, and Moisturize dry skin  Under Devices: Inspect skin under all medical devices during skin inspection , Ensure tubes are stabilized without tension, and Ensure patient is not lying on medical devices or equipment when repositioned  Ask provider to discontinue device when no longer needed.     Orders: " Reviewed and Written    RECOMMEND PRIMARY TEAM ORDER: None, at this time  Education provided: importance of repositioning, plan of care, and wound progress  Discussed plan of care with: Patient and Nurse  Notify [facility] if wound(s) deteriorate.  Nursing to notify the Provider(s) and re-consult the [facility] Nurse if new skin concern.    DATA:     Current support surface: Standard  Standard gel mattress (Isoflex)  Containment of urine/stool: Incontinence Protocol and Indwelling catheter  BMI: Body mass index is 23.9 kg/m .   Active diet order: Orders Placed This Encounter      Combination Diet Regular Diet Adult     Output: I/O last 3 completed shifts:  In: 100 [P.O.:100]  Out: 100 [Urine:100]     Labs:   Recent Labs   Lab 03/07/25  0640   ALBUMIN 2.6*   HGB 8.3*   WBC 10.6     Pressure injury risk assessment:   Sensory Perception: 2-->very limited  Moisture: 3-->occasionally moist  Activity: 2-->chairfast  Mobility: 2-->very limited  Nutrition: 2-->probably inadequate  Friction and Shear: 2-->potential problem  Yogi Score: 13    Gaurang Barba RN, CWOCN  Pager no longer in use, please contact through [email or web address] group: [facility] Nurse Bath    Dept. Office Number: [phone]

## 2025-03-07 NOTE — PROGRESS NOTES
[facility]  Patient is currently open to home care services with [facility]. The patient has  RN PT [facility]   and team have been notified of patient admission. Pt episode  ends 03/29/25.  [facility] liaison will continue to follow patient during stay. If  appropriate provide orders to resume home care at time of discharge.

## 2025-03-07 NOTE — PLAN OF CARE
"  Problem: Adult Inpatient Plan of Care  Goal: Plan of Care Review  Description: The Plan of Care Review/Shift note should be completed every shift.  The Outcome Evaluation is a brief statement about your assessment that the patient is improving, declining, or no change.  This information will be displayed automatically on your shift  note.  Outcome: Progressing  Flowsheets (Taken 3/7/2025 1536)  Outcome Evaluation: clinical improvement  Plan of Care Reviewed With: family  Overall Patient Progress: no change  Goal: Patient-Specific Goal (Individualized)  Description: You can add care plan individualizations to a care plan. Examples of Individualization might be:  \"Parent requests to be called daily at 9am for status\", \"I have a hard time hearing out of my right ear\", or \"Do not touch me to wake me up as it startles  me\".  Outcome: Progressing  Goal: Absence of Hospital-Acquired Illness or Injury  Outcome: Progressing  Goal: Optimal Comfort and Wellbeing  Outcome: Progressing  Goal: Readiness for Transition of Care  Outcome: Progressing  Intervention: Mutually Develop Transition Plan  Recent Flowsheet Documentation  Taken 3/7/2025 1500 by Nessa Post LSW  Readmission Within the Last 30 Days: previous discharge plan unsuccessful  Patient/Family Anticipates Transition to: (pt prefers to avoid TCU, if possible) --  Equipment Currently Used at Home: (4WW)   wheelchair, manual   walker, rolling   shower chair   Goal Outcome Evaluation:      Plan of Care Reviewed With: family    Overall Patient Progress: no changeOverall Patient Progress: no change    Outcome Evaluation: clinical improvement      "

## 2025-03-08 ENCOUNTER — APPOINTMENT (OUTPATIENT)
Dept: ULTRASOUND IMAGING | Facility: CLINIC | Age: 70
End: 2025-03-08
Payer: COMMERCIAL

## 2025-03-08 LAB
ALBUMIN SERPL BCG-MCNC: 2.5 G/DL (ref 3.5–5.2)
ALP SERPL-CCNC: 121 U/L (ref 40–150)
ALT SERPL W P-5'-P-CCNC: 22 U/L (ref 0–50)
ANION GAP SERPL CALCULATED.3IONS-SCNC: 11 MMOL/L (ref 7–15)
AST SERPL W P-5'-P-CCNC: 28 U/L (ref 0–45)
BASOPHILS # BLD AUTO: 0 10E3/UL (ref 0–0.2)
BASOPHILS NFR BLD AUTO: 0 %
BILIRUB SERPL-MCNC: 0.4 MG/DL
BUN SERPL-MCNC: 28.6 MG/DL (ref 8–23)
CALCIUM SERPL-MCNC: 8.5 MG/DL (ref 8.8–10.4)
CHLORIDE SERPL-SCNC: 106 MMOL/L (ref 98–107)
CREAT SERPL-MCNC: 1.29 MG/DL (ref 0.51–0.95)
EGFRCR SERPLBLD CKD-EPI 2021: 44 ML/MIN/1.73M2
EOSINOPHIL # BLD AUTO: 0 10E3/UL (ref 0–0.7)
EOSINOPHIL NFR BLD AUTO: 0 %
ERYTHROCYTE [DISTWIDTH] IN BLOOD BY AUTOMATED COUNT: 24.6 % (ref 10–15)
GLUCOSE BLDC GLUCOMTR-MCNC: 107 MG/DL (ref 70–99)
GLUCOSE SERPL-MCNC: 89 MG/DL (ref 70–99)
HCO3 SERPL-SCNC: 20 MMOL/L (ref 22–29)
HCT VFR BLD AUTO: 23.7 % (ref 35–47)
HGB BLD-MCNC: 7.8 G/DL (ref 11.7–15.7)
IMM GRANULOCYTES # BLD: 0.1 10E3/UL
IMM GRANULOCYTES NFR BLD: 1 %
LYMPHOCYTES # BLD AUTO: 1.3 10E3/UL (ref 0.8–5.3)
LYMPHOCYTES NFR BLD AUTO: 15 %
MAGNESIUM SERPL-MCNC: 2.4 MG/DL (ref 1.7–2.3)
MCH RBC QN AUTO: 30 PG (ref 26.5–33)
MCHC RBC AUTO-ENTMCNC: 32.9 G/DL (ref 31.5–36.5)
MCV RBC AUTO: 91 FL (ref 78–100)
MONOCYTES # BLD AUTO: 0.6 10E3/UL (ref 0–1.3)
MONOCYTES NFR BLD AUTO: 7 %
NEUTROPHILS # BLD AUTO: 6.6 10E3/UL (ref 1.6–8.3)
NEUTROPHILS NFR BLD AUTO: 77 %
NRBC # BLD AUTO: 0 10E3/UL
NRBC BLD AUTO-RTO: 0 /100
PHOSPHATE SERPL-MCNC: 2.4 MG/DL (ref 2.5–4.5)
PLATELET # BLD AUTO: 128 10E3/UL (ref 150–450)
POTASSIUM SERPL-SCNC: 3.9 MMOL/L (ref 3.4–5.3)
PROT SERPL-MCNC: 4.6 G/DL (ref 6.4–8.3)
RBC # BLD AUTO: 2.6 10E6/UL (ref 3.8–5.2)
SODIUM SERPL-SCNC: 137 MMOL/L (ref 135–145)
WBC # BLD AUTO: 8.6 10E3/UL (ref 4–11)

## 2025-03-08 PROCEDURE — 250N000011 HC RX IP 250 OP 636: Performed by: INTERNAL MEDICINE

## 2025-03-08 PROCEDURE — 83735 ASSAY OF MAGNESIUM: CPT

## 2025-03-08 PROCEDURE — 84100 ASSAY OF PHOSPHORUS: CPT

## 2025-03-08 PROCEDURE — 250N000009 HC RX 250

## 2025-03-08 PROCEDURE — 250N000011 HC RX IP 250 OP 636: Mod: JZ

## 2025-03-08 PROCEDURE — 36591 DRAW BLOOD OFF VENOUS DEVICE: CPT

## 2025-03-08 PROCEDURE — 82962 GLUCOSE BLOOD TEST: CPT

## 2025-03-08 PROCEDURE — 85025 COMPLETE CBC W/AUTO DIFF WBC: CPT

## 2025-03-08 PROCEDURE — 250N000013 HC RX MED GY IP 250 OP 250 PS 637: Performed by: INTERNAL MEDICINE

## 2025-03-08 PROCEDURE — 120N000002 HC R&B MED SURG/OB UMMC

## 2025-03-08 PROCEDURE — 76770 US EXAM ABDO BACK WALL COMP: CPT | Mod: 26 | Performed by: STUDENT IN AN ORGANIZED HEALTH CARE EDUCATION/TRAINING PROGRAM

## 2025-03-08 PROCEDURE — 99232 SBSQ HOSP IP/OBS MODERATE 35: CPT

## 2025-03-08 PROCEDURE — 250N000009 HC RX 250: Performed by: INTERNAL MEDICINE

## 2025-03-08 PROCEDURE — 258N000003 HC RX IP 258 OP 636

## 2025-03-08 PROCEDURE — 80053 COMPREHEN METABOLIC PANEL: CPT

## 2025-03-08 PROCEDURE — 76770 US EXAM ABDO BACK WALL COMP: CPT

## 2025-03-08 RX ADMIN — OXYCODONE HYDROCHLORIDE 20 MG: 10 TABLET ORAL at 02:45

## 2025-03-08 RX ADMIN — LIDOCAINE: 40 CREAM TOPICAL at 09:48

## 2025-03-08 RX ADMIN — HYDROMORPHONE HYDROCHLORIDE 0.5 MG: 1 INJECTION, SOLUTION INTRAMUSCULAR; INTRAVENOUS; SUBCUTANEOUS at 15:37

## 2025-03-08 RX ADMIN — SODIUM CHLORIDE, SODIUM LACTATE, POTASSIUM CHLORIDE, AND CALCIUM CHLORIDE 1000 ML: .6; .31; .03; .02 INJECTION, SOLUTION INTRAVENOUS at 15:42

## 2025-03-08 RX ADMIN — ENOXAPARIN SODIUM 40 MG: 40 INJECTION SUBCUTANEOUS at 09:47

## 2025-03-08 RX ADMIN — HYDROMORPHONE HYDROCHLORIDE 0.5 MG: 1 INJECTION, SOLUTION INTRAMUSCULAR; INTRAVENOUS; SUBCUTANEOUS at 23:40

## 2025-03-08 RX ADMIN — Medication 1 SPRAY: at 20:03

## 2025-03-08 RX ADMIN — OXYCODONE HYDROCHLORIDE 20 MG: 10 TABLET ORAL at 09:45

## 2025-03-08 RX ADMIN — DIPHENHYDRAMINE HYDROCHLORIDE AND LIDOCAINE HYDROCHLORIDE AND ALUMINUM HYDROXIDE AND MAGNESIUM HYDRO 10 ML: KIT at 09:48

## 2025-03-08 RX ADMIN — Medication 1 MG: at 01:00

## 2025-03-08 RX ADMIN — OXYCODONE HYDROCHLORIDE 20 MG: 10 TABLET ORAL at 16:21

## 2025-03-08 RX ADMIN — ACETAMINOPHEN 975 MG: 325 TABLET, FILM COATED ORAL at 15:37

## 2025-03-08 RX ADMIN — Medication 1 SPRAY: at 09:48

## 2025-03-08 RX ADMIN — SODIUM PHOSPHATE, MONOBASIC, MONOHYDRATE AND SODIUM PHOSPHATE, DIBASIC ANHYDROUS 9 MMOL: 142; 276 INJECTION, SOLUTION INTRAVENOUS at 18:12

## 2025-03-08 RX ADMIN — ACETAMINOPHEN 975 MG: 325 TABLET, FILM COATED ORAL at 09:46

## 2025-03-08 RX ADMIN — ACETAMINOPHEN 975 MG: 325 TABLET, FILM COATED ORAL at 23:32

## 2025-03-08 RX ADMIN — SODIUM CHLORIDE, POTASSIUM CHLORIDE, SODIUM LACTATE AND CALCIUM CHLORIDE: 600; 310; 30; 20 INJECTION, SOLUTION INTRAVENOUS at 16:00

## 2025-03-08 RX ADMIN — SODIUM CHLORIDE, POTASSIUM CHLORIDE, SODIUM LACTATE AND CALCIUM CHLORIDE: 600; 310; 30; 20 INJECTION, SOLUTION INTRAVENOUS at 03:10

## 2025-03-08 ASSESSMENT — ACTIVITIES OF DAILY LIVING (ADL)
ADLS_ACUITY_SCORE: 72
ADLS_ACUITY_SCORE: 76
ADLS_ACUITY_SCORE: 71
ADLS_ACUITY_SCORE: 72
ADLS_ACUITY_SCORE: 71
ADLS_ACUITY_SCORE: 77
ADLS_ACUITY_SCORE: 71
ADLS_ACUITY_SCORE: 73
ADLS_ACUITY_SCORE: 73
ADLS_ACUITY_SCORE: 75
ADLS_ACUITY_SCORE: 72
ADLS_ACUITY_SCORE: 70
ADLS_ACUITY_SCORE: 73
ADLS_ACUITY_SCORE: 77
ADLS_ACUITY_SCORE: 71
ADLS_ACUITY_SCORE: 72
ADLS_ACUITY_SCORE: 73
ADLS_ACUITY_SCORE: 73
ADLS_ACUITY_SCORE: 77
ADLS_ACUITY_SCORE: 71
ADLS_ACUITY_SCORE: 73

## 2025-03-08 NOTE — CARE CONFERENCE
Discharge care conference held.    Attendees:, Family, Physician, and Palliative    Expected date of discharge is: TBD    Pt daughter and care team met to discuss goals of care and discharge options including hospice. Pt dtr overwhelmed with news of health progression. Dtr had shared that pt progressive decline in health in last few months had been significant and difficult to manage emotionally. Care conference was terminated at dtr request to confer with niece who works in healthcare to assist with making discharge decisions. Care team is willing to hold meeting over the weekend to accommodate schedules. Meeting was closed with no discharge planning decisions made.    BRIANNA Heller   3/8/2025       Social Work and Care Management Department       SEARCHABLE in [facility] - search SOCIAL WORK       [place] (0800 - 1630) Saturday and Sunday     Units: 4A Vocera, 4C Vocera, & 4E Vocera        Units: 5A 3116-9779 Vocera, 5A 4994-5288 Vocera , BMT SW 1 BMT SW 2, BMT SW 3 & BMT SW 4  5C Off Service 5401 - 5416  5C Off Service [phone]     Units: 6A Vocera & 6B Vocera      Units: 6C Vocera     Units: 7A Vocera & 7B Vocera      Units: 7C Med Surg 7401 thru 7418 and 7C Med Surg 7502 thru 7521      Unit: [place] ED Vocera & [place] Obs Vocera     [facility] (3683-4162) Saturday and Sunday      Units: 5 Ortho Vocera, 5 Med Surg Vocera & WB ED Vocera     Units: 6 Med Surg Vocera, 8 Med Surg Vocera, & 10 ICU Vocera      After hours Vocera [facility] and After Hours Vocera [place]     Please NOTE changes to times below:    **Saturday & Sunday (1630 - 2030)    **Mon-Fri (3293-4738)     **[facility] Recognized Holidays  (9392-9597)    Units: ALL   - see above VOCERA links to units

## 2025-03-08 NOTE — PLAN OF CARE
Shift: 4256-7756    /79 (BP Location: Left arm)   Pulse 99   Temp 97.8  F (36.6  C) (Oral)   Resp 16   Wt 61.2 kg (134 lb 14.7 oz)   SpO2 100%   BMI 23.90 kg/m      Neuro: Alert. Disoriented to place, time, and situation.   Cardiac:  VSS.   Respiratory:  on RA.  GI/: Adequate urine output via SP catheter. No BM   Diet/appetite: Tolerating regular diet. Poor intake.  Activity: Not oob.  Pain: Managed with 20 mg oxycodone x1.  Skin: No new deficits noted.  LDA's:  R chest SL Rachael cath.  Plan: Continue with POC. Notify primary team with changes.      Goal Outcome Evaluation:      Plan of Care Reviewed With: patient    Overall Patient Progress: no changeOverall Patient Progress: no change    Outcome Evaluation: Pt anxious. VSS. Pain managed with oxycodone x1.

## 2025-03-08 NOTE — PROGRESS NOTES
"[facility]    Medicine Progress Note - Hospitalist Service, GOLD TEAM 8    Date of Admission:  3/4/2025    Assessment & Plan   \"Aranza\" Alis Hartman is a 69 year old woman admitted on 2/5/2025. 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       Changes today :  -spoke with daughter about timing of Care conference she said she will let us know after she has spoken with her cousin   -renal US ordered     MADHU  Creatinine at baseline 0.8-0.9  Now at 1.25, likely prerenal v/s ATN v/s postrenal   Ordered bladder scan, urine labs > prerenal   LR 1L given yesterday and today   LR @ 100 ml/hr for maintenance   Renal US ordered     Pyuria   Hx of ESBL urosepsis and bacteremia  Bladder dysfunction s/p cystostomy and suprapubic catheter  Recently admitted 11/26 - 12/8/2024 for ESBL urosepsis and bacteremia requiring ICU w/ L nephrostomy tube (removed 1/7) treated w/ ertapenem, returned 1/25-1/27 for concern for UTI but no clear infection at that time.  ID was consulted that admission and recommended avoiding frequent UA and Ucx checks in future unless patient w/ symptoms of UTI. This admission increased confusion, poor appetite and increased generalized weakness, foul smelling urine. CT abdomen unremarkable. Not septic on presentation. U/A showed growing [id number] mixed elisa. U/A collected from exchanged kent in the ED 3/4  S/p unasyn (3/4-3/5)  -stop zosyn (3/5-3/6)    Confusion   Delirium v/s Metabolic Encephalopathy   On " admission patient's family was concerned about her confusion. Patient has paranoia with suspected hallucinations. Acute worsening was likely related to possible UTI v/s dehydration lack of PO. B12 FOLATE, ammonia all normal  Initially considered CT head but patient is unable to lie flat. And also has come back to baseline after fluids, pain control.   Her mentation during this hospitalization is waxing and waning which points towards delirium   If mentation worsens can consider CT head    Serous endometrial carcinoma  Follows w/ heme/onc through [facility]. S/p SNEHA, BSO 07/2024 s/p cycle 3 carbo/taxel 10/15/2024, cycle 4 delayed in s/o recent admission, family ultimately decided to forgo any further treatment.   During care conference, it was expressed that, her cancer is likely to recur due to it being an aggressive type but when it will recur is unknown. However, should it recur, GynOnc team expressed that, because of her functional status (significant weakness) and malnutrition, treatment would not be recommended since that would worsen her health overall and benefits would not outweigh the risks.  -Gyn/onc following     Chronic Pain  Acute on Chronic Pain  Patient reporting thigh, low back pain related to her coccyx wound mentioned below  Oxycodone 20 mg q4h prn oral  IV dilaudid 0.5 mg Q4H prn for breakthrough pain     Goals of care   Recurrent hospitalizations with progressive step-wise decline  Severe Protein-Calorie Malnutrition   Advanced care planning Discussion  Sacral pressure ulcer   -Nutrition consult   -WOC consult   -palliative consult     Thrombocytopenia (resolved)  Platelets around 140, likely related to infection and deconditioning   CTM            Diet: Combination Diet Regular Diet Adult  Snacks/Supplements Adult: Ensure Enlive; With Meals  Snacks/Supplements Adult: Boost Soothe; Between Meals    DVT Prophylaxis: Pneumatic Compression Devices  Shahid Catheter: Not present  Lines: PRESENT      Port  a Cath 02/05/25 Single Lumen Right Chest wall-Site Assessment: WDL      Cardiac Monitoring: None  Code Status: Full Code      Clinically Significant Risk Factors               # Hypoalbuminemia: Lowest albumin = 2.6 g/dL at 3/7/2025  6:40 AM, will monitor as appropriate   # Thrombocytopenia: Lowest platelets = 128 in last 2 days, will monitor for bleeding   # Hypertension: Noted on problem list             # Severe Malnutrition: based on nutrition assessment and treatment provided per dietitian's recommendations., PRESENT ON ADMISSION   # Financial/Environmental Concerns: none         Social Drivers of Health            Disposition Plan     Medically Ready for Discharge: Anticipated in 2-4 Days             Piter Webber MD  Hospitalist Service, GOLD TEAM 8  [facility]  Securely message with [email or web address] (more info)  Text page via [employer] Paging/Directory   See signed in provider for up to date coverage information  ______________________________________________________________________    Interval History   No acute overnight events     Physical Exam   Vital Signs: Temp: 97.8  F (36.6  C) Temp src: Oral BP: 116/79 Pulse: 99   Resp: 16 SpO2: 100 % O2 Device: None (Room air) Oxygen Delivery: 2 LPM  Weight: 134 lbs 14.74 oz    Constitutional: awake, in distress with regards to position, oriented x2  Respiratory: No increased work of breathing, good air exchange, clear to auscultation bilaterally, no crackles or wheezing  Cardiovascular: Normal apical impulse, regular rate and rhythm, normal S1 and S2, no S3 or S4, and no murmur noted  GI: suprapubic catheter intact        Medical Decision Making       45 MINUTES SPENT BY ME on the date of service doing chart review, history, exam, documentation & further activities per the note.      Data     I have personally reviewed the following data over the past 24 hrs:    8.6  \   7.8 (L)   / 128 (L)     137 106 28.6 (H) /  89   3.9 20  (L) 1.29 (H) \     ALT: 22 AST: 28 AP: 121 TBILI: 0.4   ALB: 2.5 (L) TOT PROTEIN: 4.6 (L) LIPASE: N/A       Imaging results reviewed over the past 24 hrs:   No results found for this or any previous visit (from the past 24 hours).

## 2025-03-09 LAB
ALBUMIN SERPL BCG-MCNC: 2.4 G/DL (ref 3.5–5.2)
ALP SERPL-CCNC: 139 U/L (ref 40–150)
ALT SERPL W P-5'-P-CCNC: 29 U/L (ref 0–50)
ANION GAP SERPL CALCULATED.3IONS-SCNC: 12 MMOL/L (ref 7–15)
AST SERPL W P-5'-P-CCNC: 46 U/L (ref 0–45)
BACTERIA BLD CULT: NO GROWTH
BACTERIA BLD CULT: NO GROWTH
BACTERIA UR CULT: ABNORMAL
BACTERIA UR CULT: ABNORMAL
BASOPHILS # BLD AUTO: 0 10E3/UL (ref 0–0.2)
BASOPHILS NFR BLD AUTO: 0 %
BILIRUB SERPL-MCNC: 0.4 MG/DL
BUN SERPL-MCNC: 26.9 MG/DL (ref 8–23)
CALCIUM SERPL-MCNC: 8.4 MG/DL (ref 8.8–10.4)
CHLORIDE SERPL-SCNC: 108 MMOL/L (ref 98–107)
CREAT SERPL-MCNC: 1.23 MG/DL (ref 0.51–0.95)
EGFRCR SERPLBLD CKD-EPI 2021: 47 ML/MIN/1.73M2
EOSINOPHIL # BLD AUTO: 0 10E3/UL (ref 0–0.7)
EOSINOPHIL NFR BLD AUTO: 0 %
ERYTHROCYTE [DISTWIDTH] IN BLOOD BY AUTOMATED COUNT: 24.5 % (ref 10–15)
GLUCOSE SERPL-MCNC: 80 MG/DL (ref 70–99)
HCO3 SERPL-SCNC: 19 MMOL/L (ref 22–29)
HCT VFR BLD AUTO: 24.7 % (ref 35–47)
HGB BLD-MCNC: 8.2 G/DL (ref 11.7–15.7)
IMM GRANULOCYTES # BLD: 0.1 10E3/UL
IMM GRANULOCYTES NFR BLD: 1 %
LYMPHOCYTES # BLD AUTO: 1.5 10E3/UL (ref 0.8–5.3)
LYMPHOCYTES NFR BLD AUTO: 17 %
MAGNESIUM SERPL-MCNC: 2.2 MG/DL (ref 1.7–2.3)
MCH RBC QN AUTO: 30.7 PG (ref 26.5–33)
MCHC RBC AUTO-ENTMCNC: 33.2 G/DL (ref 31.5–36.5)
MCV RBC AUTO: 93 FL (ref 78–100)
MONOCYTES # BLD AUTO: 0.6 10E3/UL (ref 0–1.3)
MONOCYTES NFR BLD AUTO: 7 %
NEUTROPHILS # BLD AUTO: 6.6 10E3/UL (ref 1.6–8.3)
NEUTROPHILS NFR BLD AUTO: 75 %
NRBC # BLD AUTO: 0 10E3/UL
NRBC BLD AUTO-RTO: 0 /100
PHOSPHATE SERPL-MCNC: 2.9 MG/DL (ref 2.5–4.5)
PLATELET # BLD AUTO: 131 10E3/UL (ref 150–450)
POTASSIUM SERPL-SCNC: 3.6 MMOL/L (ref 3.4–5.3)
PROT SERPL-MCNC: 4.5 G/DL (ref 6.4–8.3)
RBC # BLD AUTO: 2.67 10E6/UL (ref 3.8–5.2)
SODIUM SERPL-SCNC: 139 MMOL/L (ref 135–145)
T4 FREE SERPL-MCNC: 1.17 NG/DL (ref 0.9–1.7)
TSH SERPL DL<=0.005 MIU/L-ACNC: 4.26 UIU/ML (ref 0.3–4.2)
WBC # BLD AUTO: 8.8 10E3/UL (ref 4–11)

## 2025-03-09 PROCEDURE — 250N000013 HC RX MED GY IP 250 OP 250 PS 637: Performed by: INTERNAL MEDICINE

## 2025-03-09 PROCEDURE — 250N000011 HC RX IP 250 OP 636: Performed by: INTERNAL MEDICINE

## 2025-03-09 PROCEDURE — 36415 COLL VENOUS BLD VENIPUNCTURE: CPT

## 2025-03-09 PROCEDURE — 84439 ASSAY OF FREE THYROXINE: CPT

## 2025-03-09 PROCEDURE — 84100 ASSAY OF PHOSPHORUS: CPT

## 2025-03-09 PROCEDURE — 36592 COLLECT BLOOD FROM PICC: CPT

## 2025-03-09 PROCEDURE — 84443 ASSAY THYROID STIM HORMONE: CPT

## 2025-03-09 PROCEDURE — 85025 COMPLETE CBC W/AUTO DIFF WBC: CPT

## 2025-03-09 PROCEDURE — 83735 ASSAY OF MAGNESIUM: CPT

## 2025-03-09 PROCEDURE — 99233 SBSQ HOSP IP/OBS HIGH 50: CPT

## 2025-03-09 PROCEDURE — 250N000013 HC RX MED GY IP 250 OP 250 PS 637

## 2025-03-09 PROCEDURE — 87040 BLOOD CULTURE FOR BACTERIA: CPT

## 2025-03-09 PROCEDURE — 82247 BILIRUBIN TOTAL: CPT

## 2025-03-09 PROCEDURE — 250N000011 HC RX IP 250 OP 636: Mod: JZ

## 2025-03-09 PROCEDURE — 120N000002 HC R&B MED SURG/OB UMMC

## 2025-03-09 RX ORDER — AMOXICILLIN 250 MG
1 CAPSULE ORAL AT BEDTIME
Status: DISCONTINUED | OUTPATIENT
Start: 2025-03-09 | End: 2025-03-20

## 2025-03-09 RX ORDER — HYDROMORPHONE HYDROCHLORIDE 1 MG/ML
0.5 INJECTION, SOLUTION INTRAMUSCULAR; INTRAVENOUS; SUBCUTANEOUS
Status: DISCONTINUED | OUTPATIENT
Start: 2025-03-09 | End: 2025-03-10

## 2025-03-09 RX ORDER — POLYETHYLENE GLYCOL 3350 17 G/17G
17 POWDER, FOR SOLUTION ORAL DAILY
Status: DISCONTINUED | OUTPATIENT
Start: 2025-03-09 | End: 2025-03-20

## 2025-03-09 RX ORDER — HYDROMORPHONE HYDROCHLORIDE 1 MG/ML
0.5 INJECTION, SOLUTION INTRAMUSCULAR; INTRAVENOUS; SUBCUTANEOUS ONCE
Status: COMPLETED | OUTPATIENT
Start: 2025-03-09 | End: 2025-03-09

## 2025-03-09 RX ADMIN — OXYCODONE HYDROCHLORIDE 20 MG: 10 TABLET ORAL at 16:56

## 2025-03-09 RX ADMIN — HYDROMORPHONE HYDROCHLORIDE 0.5 MG: 1 INJECTION, SOLUTION INTRAMUSCULAR; INTRAVENOUS; SUBCUTANEOUS at 15:31

## 2025-03-09 RX ADMIN — HYDROMORPHONE HYDROCHLORIDE 0.5 MG: 1 INJECTION, SOLUTION INTRAMUSCULAR; INTRAVENOUS; SUBCUTANEOUS at 11:45

## 2025-03-09 RX ADMIN — Medication 1 SPRAY: at 08:01

## 2025-03-09 RX ADMIN — HYDROMORPHONE HYDROCHLORIDE 0.5 MG: 1 INJECTION, SOLUTION INTRAMUSCULAR; INTRAVENOUS; SUBCUTANEOUS at 07:29

## 2025-03-09 RX ADMIN — POLYETHYLENE GLYCOL 3350 17 G: 17 POWDER, FOR SOLUTION ORAL at 16:56

## 2025-03-09 RX ADMIN — HYDROMORPHONE HYDROCHLORIDE 0.5 MG: 1 INJECTION, SOLUTION INTRAMUSCULAR; INTRAVENOUS; SUBCUTANEOUS at 16:56

## 2025-03-09 RX ADMIN — HYDROMORPHONE HYDROCHLORIDE 0.5 MG: 1 INJECTION, SOLUTION INTRAMUSCULAR; INTRAVENOUS; SUBCUTANEOUS at 20:47

## 2025-03-09 RX ADMIN — Medication 1 SPRAY: at 20:48

## 2025-03-09 RX ADMIN — OXYCODONE HYDROCHLORIDE 20 MG: 10 TABLET ORAL at 08:01

## 2025-03-09 RX ADMIN — OXYCODONE HYDROCHLORIDE 20 MG: 10 TABLET ORAL at 13:58

## 2025-03-09 RX ADMIN — OXYCODONE HYDROCHLORIDE 20 MG: 10 TABLET ORAL at 10:59

## 2025-03-09 RX ADMIN — SENNOSIDES AND DOCUSATE SODIUM 1 TABLET: 50; 8.6 TABLET ORAL at 23:02

## 2025-03-09 RX ADMIN — ENOXAPARIN SODIUM 40 MG: 40 INJECTION SUBCUTANEOUS at 08:01

## 2025-03-09 RX ADMIN — ACETAMINOPHEN 975 MG: 325 TABLET, FILM COATED ORAL at 23:02

## 2025-03-09 ASSESSMENT — ACTIVITIES OF DAILY LIVING (ADL)
ADLS_ACUITY_SCORE: 75
ADLS_ACUITY_SCORE: 75
ADLS_ACUITY_SCORE: 74
ADLS_ACUITY_SCORE: 74
ADLS_ACUITY_SCORE: 75
ADLS_ACUITY_SCORE: 75
ADLS_ACUITY_SCORE: 74
ADLS_ACUITY_SCORE: 75
ADLS_ACUITY_SCORE: 74
ADLS_ACUITY_SCORE: 75
ADLS_ACUITY_SCORE: 74
ADLS_ACUITY_SCORE: 75
ADLS_ACUITY_SCORE: 74
ADLS_ACUITY_SCORE: 75
ADLS_ACUITY_SCORE: 75

## 2025-03-09 NOTE — PROGRESS NOTES
Neuro: Oriented to self, sometimes oriented to location  Cardiac: VSS   Respiratory: 100 on RA  GI/: Suprapubic catheter with low UO, bladder scan 5 ml, no BM this shift  Diet/appetite: Poor appetite, encouraging intake of ensure with hourly rounding.  Activity: Q2 turn and reposition, pt unable to lay back, sitting upright and occasionally requesting to be shifted backwards but leans forward right away.  Pain: Q3 Oxycodone, IV dilaudid Q3. Pt crying out, restless, reporting pain but unable to specify exact location  Skin: Dressings changed to wounds on BLE, CDI  LDA's: L chest port with LR at 100     Plan: Repositioning, wound care, pain control, care conference with palliative and family members 1 PM tomorrow Monday 3/9/25 see MD note

## 2025-03-09 NOTE — PROVIDER NOTIFICATION
Status: Full Code   VS: at midnight weaned off bear hugger temps above 98 via rectal, at 0450 temp 96.5 rectal. Restarted on bear hugger and provider notified.  /54 (BP Location: Right arm, Patient Position: Semi-Morrow's, Cuff Size: Adult Small)   Pulse 85   Temp 96.9  F (36.1  C) (Rectal)   Resp 20   Wt 61.2 kg (134 lb 14.7 oz)   SpO2 100%   BMI 23.90 kg/m    Neuros: Alert, disoriented to place, time, situation.   Cardiac: WNL  Respiratory: WNL pt RA  GI/ : passing gas, no BM for shift. Urine output at 200mL during shift. Provider notified. Pending kidney function labs.   Diet/Nausea: pt denies nausea, having poor appetite, offered snacks.   Skin: pressure injury to coccyx, wound to L shin  LDA: Port accessed - LR infusing 100mL/hr.  Pain: control with scheduled tylenol and PRN x1 dilaudid   Activity: has not been out of bed, turned in bed  New changes this shift: continues with rectal temp monitoring. Encouraged rest, food, fluids. Patient restless, anxious, and agitated overnight. Noted to sleep on and off. Patient yelled out consistently through night and noted to be confused.   Plan: continues plan of care

## 2025-03-09 NOTE — PROGRESS NOTES
"[facility]    Medicine Progress Note - Hospitalist Service, GOLD TEAM 8    Date of Admission:  3/4/2025    Assessment & Plan   \"Aranza\" Alis Hartman is a 69 year old woman admitted on 2/5/2025. 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       Changes today :  -hypothermia overnight, improved with tino hugger. TSH slightly high but T4 normal   -spoke w/ daughter she has family commitments today and tomorrow am. She would like to talk to palliative care over the phone at  1 pm tomorrow about hospice with her cousin on conference call. Will communicate this with palliative team   -continue fluids for another day     MADHU  Creatinine at baseline 0.8-0.9  Now at 1.25, likely prerenal v/s ATN 2/2 abx   Ordered bladder scan, urine labs > prerenal   LR @ 100 ml/hr for maintenance   Renal US normal    Pyuria   Hx of ESBL urosepsis and bacteremia  Bladder dysfunction s/p cystostomy and suprapubic catheter  Recently admitted 11/26 - 12/8/2024 for ESBL urosepsis and bacteremia requiring ICU w/ L nephrostomy tube (removed 1/7) treated w/ ertapenem, returned 1/25-1/27 for concern for UTI but no clear infection at that time.  ID was consulted that admission and recommended avoiding frequent UA and Ucx checks in future unless patient w/ symptoms of UTI. This admission increased confusion, poor appetite and increased generalized weakness, foul smelling urine. CT abdomen unremarkable. Not septic on presentation. U/A showed growing " [id number] mixed elisa. U/A collected from exchanged kent in the ED 3/4  S/p unasyn (3/4-3/5)  -stop zosyn (3/5-3/6)    Confusion   Delirium v/s Metabolic Encephalopathy   On admission patient's family was concerned about her confusion. Patient has paranoia with suspected hallucinations. Acute worsening was likely related to possible UTI v/s dehydration lack of PO. B12 FOLATE, ammonia all normal  Initially considered CT head but patient is unable to lie flat. And also has come back to baseline after fluids, pain control.   Her mentation during this hospitalization is waxing and waning which points towards delirium   If mentation worsens can consider CT head  Delirium precautions    Serous endometrial carcinoma  Follows w/ heme/onc through [facility]. S/p SNEHA, BSO 07/2024 s/p cycle 3 carbo/taxel 10/15/2024, cycle 4 delayed in s/o recent admission, family ultimately decided to forgo any further treatment.   During care conference, it was expressed that, her cancer is likely to recur due to it being an aggressive type but when it will recur is unknown. However, should it recur, GynOnc team expressed that, because of her functional status (significant weakness) and malnutrition, treatment would not be recommended since that would worsen her health overall and benefits would not outweigh the risks.  -Gyn/onc following     Chronic Pain  Acute on Chronic Pain  Patient reporting thigh, low back pain related to her coccyx wound mentioned below  Oxycodone 20 mg q4h prn oral  IV dilaudid 0.5 mg Q4H prn for breakthrough pain     Goals of care   Recurrent hospitalizations with progressive step-wise decline  Severe Protein-Calorie Malnutrition   Advanced care planning Discussion  Sacral pressure ulcer   -Nutrition consult   -WOC consult   -palliative consult     Thrombocytopenia (resolved)  Platelets around 140, likely related to infection and deconditioning   CTM    Afib   Rate controlled   Prior admission had risk/benefit  discussion and discontinued AC due to vaginal bleeding           Diet: Combination Diet Regular Diet Adult  Snacks/Supplements Adult: Ensure Enlive; With Meals  Snacks/Supplements Adult: Boost Soothe; Between Meals    DVT Prophylaxis: VTE Prophylaxis : PCD  Shahid Catheter: Not present  Lines: PRESENT      Port a Cath 02/05/25 Single Lumen Right Chest wall-Site Assessment: WDL      Cardiac Monitoring: None  Code Status: Full Code      Clinically Significant Risk Factors               # Hypoalbuminemia: Lowest albumin = 2.5 g/dL at 3/8/2025  7:01 AM, will monitor as appropriate   # Thrombocytopenia: Lowest platelets = 128 in last 2 days, will monitor for bleeding  # Acute Kidney Injury, unspecified: based on a >150% or 0.3 mg/dL increase in last creatinine compared to past 90 day average, will monitor renal function  # Hypertension: Noted on problem list             # Severe Malnutrition: based on nutrition assessment and treatment provided per dietitian's recommendations.    # Financial/Environmental Concerns: none         Social Drivers of Health            Disposition Plan     Medically Ready for Discharge: Anticipated in 2-4 Days             Piter Webber MD  Hospitalist Service, [address]  Securely message with [employer] (more info)  Text page via [employer] Paging/Directory   See signed in provider for up to date coverage information  ______________________________________________________________________    Interval History   Overnight was hypothermic     Physical Exam   Vital Signs: Temp: 96.9  F (36.1  C) Temp src: Rectal BP: 112/54 Pulse: 85   Resp: 20 SpO2: 100 % O2 Device: None (Room air)    Weight: 134 lbs 14.74 oz    Constitutional: awake, in distress with regards to position, oriented x1, appeared delirious   Respiratory: No increased work of breathing, good air exchange, clear to auscultation bilaterally, no crackles or wheezing  Cardiovascular:  Normal apical impulse, regular rate and rhythm, normal S1 and S2, no S3 or S4, and no murmur noted  GI: suprapubic catheter intact     Medical Decision Making             Data

## 2025-03-09 NOTE — PROVIDER NOTIFICATION
Provider notified 5:05 AM  Gold Team: JAZMYN, patients temp 96.4 rectal. She had bear hugger on earlier and was at a stable temp of 97-98 rectal. I attempted to ween off bear hugger earlier but patient did not tolerate. Bear hugger back on and continues with rectal temp monitoring.

## 2025-03-10 LAB
ALBUMIN MFR UR ELPH: 183 MG/DL
ALBUMIN SERPL BCG-MCNC: 2.3 G/DL (ref 3.5–5.2)
ALP SERPL-CCNC: 153 U/L (ref 40–150)
ALT SERPL W P-5'-P-CCNC: 27 U/L (ref 0–50)
ANION GAP SERPL CALCULATED.3IONS-SCNC: 7 MMOL/L (ref 7–15)
AST SERPL W P-5'-P-CCNC: 34 U/L (ref 0–45)
BASOPHILS # BLD AUTO: 0 10E3/UL (ref 0–0.2)
BASOPHILS NFR BLD AUTO: 0 %
BILIRUB SERPL-MCNC: 0.4 MG/DL
BUN SERPL-MCNC: 27.2 MG/DL (ref 8–23)
CALCIUM SERPL-MCNC: 8.3 MG/DL (ref 8.8–10.4)
CHLORIDE SERPL-SCNC: 109 MMOL/L (ref 98–107)
CREAT SERPL-MCNC: 1.27 MG/DL (ref 0.51–0.95)
CREAT UR-MCNC: 80.6 MG/DL
EGFRCR SERPLBLD CKD-EPI 2021: 45 ML/MIN/1.73M2
EOSINOPHIL # BLD AUTO: 0 10E3/UL (ref 0–0.7)
EOSINOPHIL NFR BLD AUTO: 0 %
ERYTHROCYTE [DISTWIDTH] IN BLOOD BY AUTOMATED COUNT: 24.6 % (ref 10–15)
GLUCOSE SERPL-MCNC: 89 MG/DL (ref 70–99)
HCO3 SERPL-SCNC: 22 MMOL/L (ref 22–29)
HCT VFR BLD AUTO: 22.1 % (ref 35–47)
HGB BLD-MCNC: 7.3 G/DL (ref 11.7–15.7)
IMM GRANULOCYTES # BLD: 0 10E3/UL
IMM GRANULOCYTES NFR BLD: 0 %
LYMPHOCYTES # BLD AUTO: 1.8 10E3/UL (ref 0.8–5.3)
LYMPHOCYTES NFR BLD AUTO: 26 %
MAGNESIUM SERPL-MCNC: 2.1 MG/DL (ref 1.7–2.3)
MCH RBC QN AUTO: 30 PG (ref 26.5–33)
MCHC RBC AUTO-ENTMCNC: 33 G/DL (ref 31.5–36.5)
MCV RBC AUTO: 91 FL (ref 78–100)
MONOCYTES # BLD AUTO: 0.6 10E3/UL (ref 0–1.3)
MONOCYTES NFR BLD AUTO: 9 %
NEUTROPHILS # BLD AUTO: 4.5 10E3/UL (ref 1.6–8.3)
NEUTROPHILS NFR BLD AUTO: 65 %
NRBC # BLD AUTO: 0 10E3/UL
NRBC BLD AUTO-RTO: 0 /100
PHOSPHATE SERPL-MCNC: 3 MG/DL (ref 2.5–4.5)
PLATELET # BLD AUTO: 118 10E3/UL (ref 150–450)
POTASSIUM SERPL-SCNC: 3.6 MMOL/L (ref 3.4–5.3)
PROT SERPL-MCNC: 4.4 G/DL (ref 6.4–8.3)
PROT/CREAT 24H UR: 2.27 MG/MG CR (ref 0–0.2)
RBC # BLD AUTO: 2.43 10E6/UL (ref 3.8–5.2)
SODIUM SERPL-SCNC: 138 MMOL/L (ref 135–145)
SODIUM UR-SCNC: 25 MMOL/L
WBC # BLD AUTO: 6.9 10E3/UL (ref 4–11)

## 2025-03-10 PROCEDURE — 258N000003 HC RX IP 258 OP 636

## 2025-03-10 PROCEDURE — 250N000013 HC RX MED GY IP 250 OP 250 PS 637

## 2025-03-10 PROCEDURE — 250N000011 HC RX IP 250 OP 636: Mod: JZ

## 2025-03-10 PROCEDURE — 250N000013 HC RX MED GY IP 250 OP 250 PS 637: Performed by: INTERNAL MEDICINE

## 2025-03-10 PROCEDURE — 99233 SBSQ HOSP IP/OBS HIGH 50: CPT | Performed by: INTERNAL MEDICINE

## 2025-03-10 PROCEDURE — 82043 UR ALBUMIN QUANTITATIVE: CPT | Performed by: INTERNAL MEDICINE

## 2025-03-10 PROCEDURE — 84100 ASSAY OF PHOSPHORUS: CPT

## 2025-03-10 PROCEDURE — 250N000011 HC RX IP 250 OP 636: Mod: JZ | Performed by: PHARMACIST

## 2025-03-10 PROCEDURE — 84156 ASSAY OF PROTEIN URINE: CPT

## 2025-03-10 PROCEDURE — 85025 COMPLETE CBC W/AUTO DIFF WBC: CPT

## 2025-03-10 PROCEDURE — 99418 PROLNG IP/OBS E/M EA 15 MIN: CPT | Performed by: INTERNAL MEDICINE

## 2025-03-10 PROCEDURE — 120N000002 HC R&B MED SURG/OB UMMC

## 2025-03-10 PROCEDURE — 99233 SBSQ HOSP IP/OBS HIGH 50: CPT

## 2025-03-10 PROCEDURE — 84300 ASSAY OF URINE SODIUM: CPT

## 2025-03-10 PROCEDURE — 36592 COLLECT BLOOD FROM PICC: CPT

## 2025-03-10 PROCEDURE — 83735 ASSAY OF MAGNESIUM: CPT

## 2025-03-10 PROCEDURE — 80053 COMPREHEN METABOLIC PANEL: CPT

## 2025-03-10 RX ORDER — OXYCODONE HYDROCHLORIDE 10 MG/1
10 TABLET ORAL
Status: DISCONTINUED | OUTPATIENT
Start: 2025-03-10 | End: 2025-03-13

## 2025-03-10 RX ORDER — FUROSEMIDE 10 MG/ML
40 INJECTION INTRAMUSCULAR; INTRAVENOUS ONCE
Status: DISCONTINUED | OUTPATIENT
Start: 2025-03-10 | End: 2025-03-10

## 2025-03-10 RX ORDER — BISACODYL 10 MG
10 SUPPOSITORY, RECTAL RECTAL ONCE
Status: COMPLETED | OUTPATIENT
Start: 2025-03-10 | End: 2025-03-10

## 2025-03-10 RX ORDER — HYDROMORPHONE HYDROCHLORIDE 1 MG/ML
0.5 INJECTION, SOLUTION INTRAMUSCULAR; INTRAVENOUS; SUBCUTANEOUS
Status: DISCONTINUED | OUTPATIENT
Start: 2025-03-10 | End: 2025-03-17

## 2025-03-10 RX ADMIN — SODIUM CHLORIDE, POTASSIUM CHLORIDE, SODIUM LACTATE AND CALCIUM CHLORIDE: 600; 310; 30; 20 INJECTION, SOLUTION INTRAVENOUS at 05:21

## 2025-03-10 RX ADMIN — HYDROMORPHONE HYDROCHLORIDE 0.5 MG: 1 INJECTION, SOLUTION INTRAMUSCULAR; INTRAVENOUS; SUBCUTANEOUS at 09:29

## 2025-03-10 RX ADMIN — HYDROMORPHONE HYDROCHLORIDE 0.5 MG: 1 INJECTION, SOLUTION INTRAMUSCULAR; INTRAVENOUS; SUBCUTANEOUS at 18:11

## 2025-03-10 RX ADMIN — HYDROMORPHONE HYDROCHLORIDE 0.5 MG: 1 INJECTION, SOLUTION INTRAMUSCULAR; INTRAVENOUS; SUBCUTANEOUS at 19:44

## 2025-03-10 RX ADMIN — HYDROMORPHONE HYDROCHLORIDE 0.5 MG: 1 INJECTION, SOLUTION INTRAMUSCULAR; INTRAVENOUS; SUBCUTANEOUS at 11:42

## 2025-03-10 RX ADMIN — HYDROMORPHONE HYDROCHLORIDE 0.5 MG: 1 INJECTION, SOLUTION INTRAMUSCULAR; INTRAVENOUS; SUBCUTANEOUS at 14:32

## 2025-03-10 RX ADMIN — SENNOSIDES AND DOCUSATE SODIUM 1 TABLET: 50; 8.6 TABLET ORAL at 21:50

## 2025-03-10 RX ADMIN — Medication 1 SPRAY: at 19:45

## 2025-03-10 RX ADMIN — HYDROMORPHONE HYDROCHLORIDE 0.5 MG: 1 INJECTION, SOLUTION INTRAMUSCULAR; INTRAVENOUS; SUBCUTANEOUS at 05:21

## 2025-03-10 ASSESSMENT — ACTIVITIES OF DAILY LIVING (ADL)
ADLS_ACUITY_SCORE: 75
ADLS_ACUITY_SCORE: 75
ADLS_ACUITY_SCORE: 77
ADLS_ACUITY_SCORE: 75
ADLS_ACUITY_SCORE: 75
ADLS_ACUITY_SCORE: 77
ADLS_ACUITY_SCORE: 75
ADLS_ACUITY_SCORE: 77
ADLS_ACUITY_SCORE: 75
ADLS_ACUITY_SCORE: 77
ADLS_ACUITY_SCORE: 75
ADLS_ACUITY_SCORE: 77
ADLS_ACUITY_SCORE: 77
ADLS_ACUITY_SCORE: 75

## 2025-03-10 NOTE — CONSULTS
"Palliative Care Initial Social Work Note  Location: [facility]    Patient Info:  Per EMR, Alis Hartman is a 69 year old female with a past medical history of cervical cancer s/p radiation, seriuos adenocarcinoma s/p SNEHA/BSO and cystotomy w/ suprapubic catheter placement 07/2024 and 3 cycles of carbo/taxol (10/2024), hx ESBL urosepsis and bacteremia, RNY gastric bypass, afib on apixaban, HTN, CKD stage 3, and multiple recent admissions (most recently 2/5-2/25 with MADHU, deconditioning, and malnutrition). She is now admitted with concern for UTI and failure to thrive/general weakness.     Brief summary of visit: Brief visit this afternoon with pt and dtr Joy. Palliative medical providers along with other medical teams had phone conversation earlier in afternoon with Joy. PCSW able to catch dtr at pt's bedside to introduce role of social work as part of the palliative care team.    Palliative care social work can assist with adjustment to illness and coping, processing of information, emotional and decisional support needs, children s grief support, non-pharm techniques for stress and anxiety and promote effective communication and caring support.    Much of visit today spent engaging with Joy through shared attempts to redirect and provide comfort to Aranza. Joy with multiple attempts to have her mom take sips of a milkshake, minimal success. Aranza sitting up in bed and leaning forward with head resting on pillow, not a new way of positioning herself per dtr. PCSW lightly scratched pt's back while dtr shared her belief that pt needs \"that feeding tube they talked about\" so she can regain energy and work on gaining strength.    PCSW emphasized role of offering support to Joy while she navigates Aranza's hospitalization; Joy appeared appreciative though somewhat hesitant. Joy named trust issues that her mom has with medical providers, PCSW elected to tread lightly and not impose " what might be misinterpreted as pressure with goal of continuing to build rapport.    Interventions used today: trust and rapport building, validation, emotional support, encouraged self care for Joy    Date of Admission: 3/4/2025    Reason for consult: Patient and family support    Sources of information: Family member dtr Joy  Staff palliative team  Other chart review    Recommendations & Plan:  PCSW will continue to follow while palliative care team remains involved; ongoing assessment of emotional and decisional support needs       Symptoms & Concerns Addressed Today:  Emotional support for dtlele Hamilton; continue to assess needs  Family    Strengths Identified:    Supportive daughter    Relationships & Support:  Aspects of relationships and support assessed today:  Identified family members: alanna Hamilton  Professional supports: medical teams  Family coping: continue to assess  Bereavement Risk concerns: continue to assess    Coping, Mental Health & Adjustment to Illness:   Adjustment to Illness/Hospitalization    Goals, Decision Making & Advance Care Planning:   Prognosis, Goals, and/or Advance Care Planning were assessed today: No  If yes, brief summary of discussion: not discussed during PCSW visit  Preferred language: English  Patient's decision making preferences: not assessed  I have concerns about the patient/family's health literacy today: Unable to assess today  Patient has a completed Health Care Directive: Yes, and on file.  Code status per chart review: Full Code      Clinical Social Work Interventions:   Assessment of palliative specific issues    Introduction of Palliative clinical social work interventions   Adjustment to illness counseling  Facilitation of processing of thoughts/feelings      WILLIAM Vargas, [facility]  [facility], Palliative Care  Securely message with the [email or web address] Web Console (learn more here) or  Text page via [employer] Paging/Directory

## 2025-03-10 NOTE — PROGRESS NOTES
"[facility]    Medicine Progress Note - Hospitalist Service, GOLD TEAM 8    Date of Admission:  3/4/2025    Assessment & Plan   \"Aranza\" Alis Hartman is a 69 year old woman admitted on 2/5/2025. 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       Changes today :  -hypothermia overnight, improved with tino hugger. TSH slightly high but T4 normal   -care conference held today w/ daughter, patient niece not able to be reached. Goals are restorative today. Reschedule another meeting tomorrow   Daughter agreeable for psych eval to see if they can assist with some medication recs to help with her mentation given prior psych conditions   -we discussed possibility of NG tube placement for nutrition for tomorrow pending psych eval  -stop fluids today   -repeat urine studies today     MADHU  Creatinine at baseline 0.8-0.9  Now at 1.25, likely prerenal v/s ATN 2/2 abx   Ordered bladder scan, urine labs > prerenal   LR @ 100 ml/hr stopped   -repeat labs   Renal US normal    Pyuria   Hx of ESBL urosepsis and bacteremia  Bladder dysfunction s/p cystostomy and suprapubic catheter  Recently admitted 11/26 - 12/8/2024 for ESBL urosepsis and bacteremia requiring ICU w/ L nephrostomy tube (removed 1/7) treated w/ ertapenem, returned 1/25-1/27 for concern for UTI but no clear infection at that time.  ID was consulted that admission and recommended avoiding frequent UA and Ucx checks in future unless patient w/ symptoms of UTI. This admission " increased confusion, poor appetite and increased generalized weakness, foul smelling urine. CT abdomen unremarkable. Not septic on presentation. U/A showed growing [id number] mixed elisa. U/A collected from exchanged kent in the ED 3/4  S/p unasyn (3/4-3/5)  -stop zosyn (3/5-3/6)    Confusion   Delirium v/s Metabolic Encephalopathy   Schizophrenia RAFAEL   Unspecified neurocognitive Disorder: MOCA 25/30 9/22/22   On admission patient's family was concerned about her confusion. Patient has paranoia with suspected hallucinations. Acute worsening was likely related to possible UTI v/s dehydration lack of PO. B12 FOLATE, ammonia all normal  Initially considered CT head but patient is unable to lie flat. And also has come back to baseline after fluids, pain control.   Her mentation during this hospitalization is waxing and waning which points towards delirium   If mentation worsens can consider CT head  Patient's family is now open to hearing from psychiatry if the patient has any psychiatric conditions that could be treated medically with the hope that her overall condition (nutritional included) could improve.  The daughter would like to be notified of any medication recommendations before starting it.  Delirium precautions    Serous endometrial carcinoma  Follows w/ heme/onc through [place]. S/p SNEHA, BSO 07/2024 s/p cycle 3 carbo/taxel 10/15/2024, cycle 4 delayed in s/o recent admission, family ultimately decided to forgo any further treatment.   During care conference, it was expressed that, her cancer is likely to recur due to it being an aggressive type but when it will recur is unknown. However, should it recur, GynOnc team expressed that, because of her functional status (significant weakness) and malnutrition, treatment would not be recommended since that would worsen her health overall and benefits would not outweigh the risks.  -Gyn/onc following     Chronic Pain  Acute on Chronic Pain  Patient reporting  thigh, low back pain related to her coccyx wound mentioned below  Oxycodone 10 mg q4h prn oral  IV dilaudid 0.5 mg Q2H prn for breakthrough pain     Goals of care   Recurrent hospitalizations with progressive step-wise decline  Severe Protein-Calorie Malnutrition   Advanced care planning Discussion  Sacral pressure ulcer   -Nutrition consult   -WOC consult   -palliative consult     Thrombocytopenia (resolved)  Platelets around 140, likely related to infection and deconditioning   CTM    Afib   Rate controlled   Prior admission had risk/benefit discussion and discontinued AC due to vaginal bleeding           Diet: Combination Diet Regular Diet Adult  Snacks/Supplements Adult: Ensure Enlive; With Meals  Snacks/Supplements Adult: Boost Soothe; Between Meals    DVT Prophylaxis: Pneumatic Compression Devices  Shahid Catheter: Not present  Lines: PRESENT      Port a Cath 02/05/25 Single Lumen Right Chest wall-Site Assessment: WDL      Cardiac Monitoring: None  Code Status: Full Code      Clinically Significant Risk Factors          # Hyperchloremia: Highest Cl = 109 mmol/L in last 2 days, will monitor as appropriate          # Hypoalbuminemia: Lowest albumin = 2.3 g/dL at 3/10/2025  5:29 AM, will monitor as appropriate   # Thrombocytopenia: Lowest platelets = 118 in last 2 days, will monitor for bleeding   # Hypertension: Noted on problem list             # Severe Malnutrition: based on nutrition assessment and treatment provided per dietitian's recommendations.    # Financial/Environmental Concerns: none         Social Drivers of Health            Disposition Plan     Medically Ready for Discharge: Anticipated in 2-4 Days             Piter Webber MD  Hospitalist Service, GOLD TEAM [address] [place] [address]  Securely message with [employer] (more info)  Text page via [employer] Paging/Directory   See signed in provider for up to date coverage  information  ______________________________________________________________________    Interval History   Overnight has been having increased deilirum     Physical Exam   Vital Signs: Temp: (!) 96.2  F (35.7  C) Temp src: Axillary BP: (!) 112/94 Pulse: 78   Resp: 19 SpO2: 100 % O2 Device: None (Room air)    Weight: 134 lbs 14.74 oz    Constitutional: sleepy, in distress with regards to position, oriented x1, appeared delirious   Respiratory: No increased work of breathing, good air exchange, clear to auscultation bilaterally, no crackles or wheezing  Cardiovascular: Normal apical impulse, regular rate and rhythm, normal S1 and S2, no S3 or S4, and no murmur noted  GI: suprapubic catheter intact     Medical Decision Making       60 MINUTES SPENT BY ME on the date of service doing chart review, history, exam, documentation & further activities per the note.      Data     I have personally reviewed the following data over the past 24 hrs:    6.9  \   7.3 (L)   / 118 (L)     138 109 (H) 27.2 (H) /  89   3.6 22 1.27 (H) \     ALT: 27 AST: 34 AP: 153 (H) TBILI: 0.4   ALB: 2.3 (L) TOT PROTEIN: 4.4 (L) LIPASE: N/A       Imaging results reviewed over the past 24 hrs:   No results found for this or any previous visit (from the past 24 hours).

## 2025-03-10 NOTE — PROVIDER NOTIFICATION
Provider notified 6:16 AM  Gold team: Zeferino Ornelas  Patient only had unit(s) rine output of 175mL for shift via super pubic cath. Patient has +4 edema pitting edema to BLE with weeping. Please advise.

## 2025-03-10 NOTE — PROGRESS NOTES
"  PALLIATIVE CARE PROGRESS NOTE  [facility]     Patient Name: Alis Hartman  Date of Admission: 3/4/2025   Today the patient was seen for: goals of care, symptom management     Recommendations & Counseling       GOALS OF CARE:     Care conference phone call today to daughter Aranza jointly with medicine, gyn/onc and palliative team, reviewed the following:  Restorative goals without limits: We again discussed general treatment options (full/restorative and comfort only/hospice). We specifically discussed what full cares could look like for Aranza, specifically addressing her mental health. We discussed the idea of time limited trials of interventions such as a feeding tube or anti-psychotic medications. Aranza again mentioned hospice but is not ready to make a decision at this time. We discussed code status and recommended DNR/DNI but Joy would like Aranza to remain full code until she speaks to her family about that further.   Joy again expresses that she is overwhelmed by the situation and the idea of her mother dying. She again wants to discuss with her family, specifically her cousin and brother. She was planning to go reach out to her family and \"call a family meeting\" tonight. In the meantime we made plan to proceed with psychiatry consult to reassess role of psychiatric medications to help with her mental status as well as consideration of a time limited trial of a feeding tube as long as Aranza agrees/allows it to be placed.   We set a time to discuss again with Joy. We will call her tomorrow at 1pm. We emphasized our concern regarding Aranza's situation and the need to make decisions, even though it is obviously very challenging to do so.     ADVANCE CARE PLANNING:  Previously completed HCD naming Aranza (daughter) as HCA, appears this was not dated and deemed \"invalid.\" Aranza confirmed during last admission that Joy is her surrogate decision " "maker per notes in chart.   There is a POLST form on file, this was reviewed and current.  Code status: Full Code    MEDICAL MANAGEMENT:   #Pain, chronic, low back and abdominal after surgery in July.   #Pain, L thigh  #Pain, sacral/coccyx wound and B arm and leg wounds  #Sedation  It is remains difficult to assess Astrids pain in the setting of acute delirium vs dementia vs psychosis.  She has moments where she appears to be in pain alternating with moments where she appears comfortable. She is getting very few doses of her oxycodone given that she is not taking oral meds and is not having any withdrawal symptoms.   - Acetaminophen 975mg q8hr  - Decrease oxycodone to 10mg q3hr prn  - Increased frequency of hydromorphone to 0.5mg q2 hr prn for pain not controlled with PO meds.   - encourage bowel regimen as she is complaining of abdominal discomfort and constipation may be contributing.     PSYCHOSOCIAL/SPIRITUAL:  Family: Daughter (Joy) and grandchildren, son, val (Armida)  Joy mentions things being \"God's plan\", though specific Jain needs/affiliations not addressed today    Palliative Care will continue to follow. Thank you for the consult and allowing us to aid in the care of Alis Hartman.    These recommendations have been discussed with primary medicine team, gyn/onc team, and bedside nurse.    Veronica Kahn MD  [facility], Palliative Care  Securely message with the [employer] (learn more here) or  Text page via [facility] Paging/Directory      Patient seen and evaluated with Dr. Kahn.    Agree with assessment and recommendations.  I personally spent 85 minutes caring for the patient regarding symptom management and goals of care on the date of the encounter including bedside assessment, chart review, pre-conference meeting with teams and a 40 minute phone call conference with family. These recommendations were given to the primary team via face to face discussion.  Ritika Franco MD / " Palliative Medicine   Securely message with the [employer] Web Console (learn more here)   Text page via [facility] Paging/Directory       Assessment          Alis Hartman is a 69 year old female with a past medical history of cervical cancer s/p radiation, seriuos adenocarcinoma s/p SNEHA/BSO and cystotomy w/ suprapubic catheter placement 07/2024 and 3 cycles of carbo/taxol (10/2024), hx ESBL urosepsis and bacteremia, RNY gastric bypass, afib on apixaban, HTN, CKD stage 3, and multiple recent admissions (most recently 2/5-2/25 with MADHU, deconditioning, and malnutrition). She is now admitted with concern for UTI and failure to thrive/general weakness.      The patient's daughter Joy met with Dr. Perez of gyn-onc yesterday and goals of care were discussed. She discussed hospice and palliative care has been consulted to continue goals of care discussion, including discussion of hospice and pain management.     We had a care conference on 3/7 and again on 3/10. Daughter (Aranza) expresses that she is very overwhelmed by her mother's condition and the possibility of her dying. Goals are currently restorative though we are continuing to discuss options with Aranza, including hospice.      Today, the patient was seen for:  Goal of care  Family support  Symptom management  Palliative encounter      Interval History:     Multidisciplinary collaboration:  Bedside nurse, medicine attending, gyn/onc provider  Notable medications:  Oxycodone, hydromorphone   Patient/family narrative    Today Aranza is not taking any oral medications, eating, or drinking. We will occasionally call out for help per her nurse.     We participated in a care conference with Aranza's daughter, Joy, today over the phone. Medicine attending and gyn/onc provider was also present. Joy again emphasized that she felt very overwhelmed by her mother's situation. Despite having scheduled this time to meet with Joy and her cousin yesterday, Joy's  "cousin was not available to talk. Joy expressed that she had been on the phone with several people, including a crisis line. We asked her if she was having a mental health crisis of her own though she did not directly answer.     We discussed Aranza's clinical status and her general treatment options (restorative care vs hospice). We raised concerns about Aranza's mental health. She does have a prior diagnosis of schizophrenia with paranoia. Aranza feels that this diagnosis is incorrect but does state that her mother is afraid that people are trying to secretly give her medications in her food. She is concerned that she was overmedicated with psychiatric medications in the past. She is open to a psychiatry evaluation today. Joy is also focused on her's mother's nutrition. She would like a feeding tube placed. We discussed that we could potentially try a time limited trial of a feeding tube, but that we would not place it if Aranza had any opposition.     We again discussed potential risks and rationale of attempting cardiac resuscitation and the probability of survival as well as quality of life implications.  Joy stated that this was a big topic and needed to talk to her family about it. At this time, Joy would like her mother to remain full code     Joy feels that she needs to discuss any \"big decisions\" with her family, including her cousin Armida and her brother Hemanth. She plans to talk to them tonight.     Review of Systems:     Besides above, ROS was reviewed and is unremarkable        Physical Exam:   Temp:  [96.2  F (35.7  C)-97.8  F (36.6  C)] 96.2  F (35.7  C)  Pulse:  [78-94] 89  Resp:  [18-20] 20  BP: ()/(64-97) 100/87  SpO2:  [100 %] 100 %  134 lbs 14.74 oz    Gen: Sitting in bed hunched forward, chronically ill appearing  Neuro: Awake, oriented to place. Moving all extremities. Intermittently answers questions with brief responses.   Pulm: nonlabored breathing, comfortable on room " air, no cough  CV: RRR by peripheral pulse, noncyanotic   ABD: Unable to examine  MSK:  BLE edema  Skin: Warm, dry, no rashes or abrasions on exposed skin. Chronic wounds not exposed      Data Reviewed:     Results for orders placed or performed during the hospital encounter of 03/04/25 (from the past 24 hours)   CBC with Platelets & Differential    Narrative    The following orders were created for panel order CBC with Platelets & Differential.  Procedure                               Abnormality         Status                     ---------                               -----------         ------                     CBC with platelets and ...[[id number]]  Abnormal            Final result                 Please view results for these tests on the individual orders.   CBC with platelets and differential   Result Value Ref Range    WBC Count 6.9 4.0 - 11.0 10e3/uL    RBC Count 2.43 (L) 3.80 - 5.20 10e6/uL    Hemoglobin 7.3 (L) 11.7 - 15.7 g/dL    Hematocrit 22.1 (L) 35.0 - 47.0 %    MCV 91 78 - 100 fL    MCH 30.0 26.5 - 33.0 pg    MCHC 33.0 31.5 - 36.5 g/dL    RDW 24.6 (H) 10.0 - 15.0 %    Platelet Count 118 (L) 150 - 450 10e3/uL    % Neutrophils 65 %    % Lymphocytes 26 %    % Monocytes 9 %    % Eosinophils 0 %    % Basophils 0 %    % Immature Granulocytes 0 %    NRBCs per 100 WBC 0 <1 /100    Absolute Neutrophils 4.5 1.6 - 8.3 10e3/uL    Absolute Lymphocytes 1.8 0.8 - 5.3 10e3/uL    Absolute Monocytes 0.6 0.0 - 1.3 10e3/uL    Absolute Eosinophils 0.0 0.0 - 0.7 10e3/uL    Absolute Basophils 0.0 0.0 - 0.2 10e3/uL    Absolute Immature Granulocytes 0.0 <=0.4 10e3/uL    Absolute NRBCs 0.0 10e3/uL   Comprehensive metabolic panel   Result Value Ref Range    Sodium 138 135 - 145 mmol/L    Potassium 3.6 3.4 - 5.3 mmol/L    Carbon Dioxide (CO2) 22 22 - 29 mmol/L    Anion Gap 7 7 - 15 mmol/L    Urea Nitrogen 27.2 (H) 8.0 - 23.0 mg/dL    Creatinine 1.27 (H) 0.51 - 0.95 mg/dL    GFR Estimate 45 (L) >60 mL/min/1.73m2    Calcium  8.3 (L) 8.8 - 10.4 mg/dL    Chloride 109 (H) 98 - 107 mmol/L    Glucose 89 70 - 99 mg/dL    Alkaline Phosphatase 153 (H) 40 - 150 U/L    AST 34 0 - 45 U/L    ALT 27 0 - 50 U/L    Protein Total 4.4 (L) 6.4 - 8.3 g/dL    Albumin 2.3 (L) 3.5 - 5.2 g/dL    Bilirubin Total 0.4 <=1.2 mg/dL   Magnesium   Result Value Ref Range    Magnesium 2.1 1.7 - 2.3 mg/dL   Phosphorus   Result Value Ref Range    Phosphorus 3.0 2.5 - 4.5 mg/dL   Sodium random urine   Result Value Ref Range    Sodium Urine mmol/L 25 mmol/L   Protein  random urine   Result Value Ref Range    Total Protein Urine mg/dL 183.0   mg/dL    Total Protein Urine mg/mg Creat 2.27 (H) 0.00 - 0.20 mg/mg Cr    Creatinine Urine mg/dL 80.6 mg/dL         Medical Decision Making       MANAGEMENT DISCUSSED with the following over the past 24 hours: medicine attending, gyn/onc THAO, bedside RN   NOTE(S)/MEDICAL RECORDS REVIEWED over the past 24 hours: gyn onc, medicine, nurisng, MAR review  Tests REVIEWED in the past 24 hours:  - CMP  - CBC  SUPPLEMENTAL HISTORY, in addition to the patient's history, over the past 24 hours obtained from:   - daughter Joy

## 2025-03-10 NOTE — PLAN OF CARE
Goal Outcome Evaluation:      Plan of Care Reviewed With: patient, child    Overall Patient Progress: no change    Oriented to self and place. VSS. RA. Mumbled speech, difficult to understand. Refusing oral medications and turns. Bedding changed and quang cares given. Suprapubic catheter in place, draining small amounts. Offered water, food, and protein drink, refused food, drank small amounts of water and protein drink. Moderate edema on both legs and feet. Port infusing LR TKO.

## 2025-03-11 LAB
ALBUMIN SERPL BCG-MCNC: 2.4 G/DL (ref 3.5–5.2)
ALBUMIN UR-MCNC: 200 MG/DL
ALP SERPL-CCNC: 174 U/L (ref 40–150)
ALT SERPL W P-5'-P-CCNC: 29 U/L (ref 0–50)
ANION GAP SERPL CALCULATED.3IONS-SCNC: 8 MMOL/L (ref 7–15)
APPEARANCE UR: ABNORMAL
AST SERPL W P-5'-P-CCNC: 28 U/L (ref 0–45)
BACTERIA #/AREA URNS HPF: ABNORMAL /HPF
BASOPHILS # BLD AUTO: 0 10E3/UL (ref 0–0.2)
BASOPHILS # BLD AUTO: 0 10E3/UL (ref 0–0.2)
BASOPHILS NFR BLD AUTO: 0 %
BASOPHILS NFR BLD AUTO: 0 %
BILIRUB SERPL-MCNC: 0.5 MG/DL
BILIRUB UR QL STRIP: NEGATIVE
BUN SERPL-MCNC: 28.1 MG/DL (ref 8–23)
CALCIUM SERPL-MCNC: 8.4 MG/DL (ref 8.8–10.4)
CHLORIDE SERPL-SCNC: 109 MMOL/L (ref 98–107)
COLOR UR AUTO: ABNORMAL
CREAT SERPL-MCNC: 1.29 MG/DL (ref 0.51–0.95)
EGFRCR SERPLBLD CKD-EPI 2021: 44 ML/MIN/1.73M2
EOSINOPHIL # BLD AUTO: 0 10E3/UL (ref 0–0.7)
EOSINOPHIL # BLD AUTO: 0 10E3/UL (ref 0–0.7)
EOSINOPHIL NFR BLD AUTO: 0 %
EOSINOPHIL NFR BLD AUTO: 0 %
ERYTHROCYTE [DISTWIDTH] IN BLOOD BY AUTOMATED COUNT: 25.3 % (ref 10–15)
ERYTHROCYTE [DISTWIDTH] IN BLOOD BY AUTOMATED COUNT: 25.4 % (ref 10–15)
GLUCOSE SERPL-MCNC: 93 MG/DL (ref 70–99)
GLUCOSE UR STRIP-MCNC: NEGATIVE MG/DL
HCO3 SERPL-SCNC: 22 MMOL/L (ref 22–29)
HCT VFR BLD AUTO: 23.5 % (ref 35–47)
HCT VFR BLD AUTO: 24.8 % (ref 35–47)
HGB BLD-MCNC: 7.9 G/DL (ref 11.7–15.7)
HGB BLD-MCNC: 8.2 G/DL (ref 11.7–15.7)
HGB UR QL STRIP: ABNORMAL
HYALINE CASTS: 28 /LPF
IMM GRANULOCYTES # BLD: 0 10E3/UL
IMM GRANULOCYTES # BLD: 0 10E3/UL
IMM GRANULOCYTES NFR BLD: 1 %
IMM GRANULOCYTES NFR BLD: 1 %
KETONES UR STRIP-MCNC: NEGATIVE MG/DL
LACTATE SERPL-SCNC: 1.5 MMOL/L (ref 0.7–2)
LEUKOCYTE ESTERASE UR QL STRIP: ABNORMAL
LYMPHOCYTES # BLD AUTO: 1.5 10E3/UL (ref 0.8–5.3)
LYMPHOCYTES # BLD AUTO: 1.6 10E3/UL (ref 0.8–5.3)
LYMPHOCYTES NFR BLD AUTO: 19 %
LYMPHOCYTES NFR BLD AUTO: 19 %
MAGNESIUM SERPL-MCNC: 2.1 MG/DL (ref 1.7–2.3)
MCH RBC QN AUTO: 30.6 PG (ref 26.5–33)
MCH RBC QN AUTO: 31.2 PG (ref 26.5–33)
MCHC RBC AUTO-ENTMCNC: 33.1 G/DL (ref 31.5–36.5)
MCHC RBC AUTO-ENTMCNC: 33.6 G/DL (ref 31.5–36.5)
MCV RBC AUTO: 91 FL (ref 78–100)
MCV RBC AUTO: 94 FL (ref 78–100)
MONOCYTES # BLD AUTO: 0.5 10E3/UL (ref 0–1.3)
MONOCYTES # BLD AUTO: 0.6 10E3/UL (ref 0–1.3)
MONOCYTES NFR BLD AUTO: 7 %
MONOCYTES NFR BLD AUTO: 7 %
MUCOUS THREADS #/AREA URNS LPF: PRESENT /LPF
NEUTROPHILS # BLD AUTO: 5.5 10E3/UL (ref 1.6–8.3)
NEUTROPHILS # BLD AUTO: 5.9 10E3/UL (ref 1.6–8.3)
NEUTROPHILS NFR BLD AUTO: 73 %
NEUTROPHILS NFR BLD AUTO: 73 %
NITRATE UR QL: NEGATIVE
NRBC # BLD AUTO: 0 10E3/UL
NRBC # BLD AUTO: 0 10E3/UL
NRBC BLD AUTO-RTO: 0 /100
NRBC BLD AUTO-RTO: 0 /100
PH UR STRIP: 5.5 [PH] (ref 5–7)
PHOSPHATE SERPL-MCNC: 3.2 MG/DL (ref 2.5–4.5)
PLATELET # BLD AUTO: 135 10E3/UL (ref 150–450)
PLATELET # BLD AUTO: 148 10E3/UL (ref 150–450)
POTASSIUM SERPL-SCNC: 3.6 MMOL/L (ref 3.4–5.3)
PROT SERPL-MCNC: 4.6 G/DL (ref 6.4–8.3)
RBC # BLD AUTO: 2.58 10E6/UL (ref 3.8–5.2)
RBC # BLD AUTO: 2.63 10E6/UL (ref 3.8–5.2)
RBC URINE: >182 /HPF
SODIUM SERPL-SCNC: 139 MMOL/L (ref 135–145)
SP GR UR STRIP: 1.02 (ref 1–1.03)
SQUAMOUS EPITHELIAL: 6 /HPF
TRANSITIONAL EPI: 3 /HPF
UROBILINOGEN UR STRIP-MCNC: NORMAL MG/DL
WBC # BLD AUTO: 7.5 10E3/UL (ref 4–11)
WBC # BLD AUTO: 8.1 10E3/UL (ref 4–11)
WBC CLUMPS #/AREA URNS HPF: PRESENT /HPF
WBC URINE: >182 /HPF
YEAST #/AREA URNS HPF: ABNORMAL /HPF

## 2025-03-11 PROCEDURE — 99418 PROLNG IP/OBS E/M EA 15 MIN: CPT | Performed by: STUDENT IN AN ORGANIZED HEALTH CARE EDUCATION/TRAINING PROGRAM

## 2025-03-11 PROCEDURE — 85025 COMPLETE CBC W/AUTO DIFF WBC: CPT

## 2025-03-11 PROCEDURE — 99222 1ST HOSP IP/OBS MODERATE 55: CPT | Performed by: PSYCHIATRY & NEUROLOGY

## 2025-03-11 PROCEDURE — 80053 COMPREHEN METABOLIC PANEL: CPT

## 2025-03-11 PROCEDURE — 82540 ASSAY OF CREATINE: CPT | Performed by: STUDENT IN AN ORGANIZED HEALTH CARE EDUCATION/TRAINING PROGRAM

## 2025-03-11 PROCEDURE — 250N000011 HC RX IP 250 OP 636: Mod: JZ | Performed by: PHARMACIST

## 2025-03-11 PROCEDURE — 83735 ASSAY OF MAGNESIUM: CPT

## 2025-03-11 PROCEDURE — 250N000013 HC RX MED GY IP 250 OP 250 PS 637: Performed by: INTERNAL MEDICINE

## 2025-03-11 PROCEDURE — 250N000013 HC RX MED GY IP 250 OP 250 PS 637: Performed by: STUDENT IN AN ORGANIZED HEALTH CARE EDUCATION/TRAINING PROGRAM

## 2025-03-11 PROCEDURE — 84100 ASSAY OF PHOSPHORUS: CPT

## 2025-03-11 PROCEDURE — 250N000011 HC RX IP 250 OP 636: Mod: JZ | Performed by: HOSPITALIST

## 2025-03-11 PROCEDURE — 81001 URINALYSIS AUTO W/SCOPE: CPT | Performed by: STUDENT IN AN ORGANIZED HEALTH CARE EDUCATION/TRAINING PROGRAM

## 2025-03-11 PROCEDURE — 120N000002 HC R&B MED SURG/OB UMMC

## 2025-03-11 PROCEDURE — 99233 SBSQ HOSP IP/OBS HIGH 50: CPT | Performed by: STUDENT IN AN ORGANIZED HEALTH CARE EDUCATION/TRAINING PROGRAM

## 2025-03-11 PROCEDURE — 99418 PROLNG IP/OBS E/M EA 15 MIN: CPT | Performed by: CLINICAL NURSE SPECIALIST

## 2025-03-11 PROCEDURE — 99233 SBSQ HOSP IP/OBS HIGH 50: CPT | Performed by: CLINICAL NURSE SPECIALIST

## 2025-03-11 PROCEDURE — 83605 ASSAY OF LACTIC ACID: CPT | Performed by: HOSPITALIST

## 2025-03-11 RX ORDER — HYDROMORPHONE HYDROCHLORIDE 1 MG/ML
0.5 INJECTION, SOLUTION INTRAMUSCULAR; INTRAVENOUS; SUBCUTANEOUS ONCE
Status: COMPLETED | OUTPATIENT
Start: 2025-03-11 | End: 2025-03-11

## 2025-03-11 RX ORDER — LIDOCAINE 4 G/G
1 PATCH TOPICAL
Status: DISCONTINUED | OUTPATIENT
Start: 2025-03-12 | End: 2025-03-11

## 2025-03-11 RX ORDER — LIDOCAINE 4 G/G
1 PATCH TOPICAL
Status: DISCONTINUED | OUTPATIENT
Start: 2025-03-11 | End: 2025-03-20

## 2025-03-11 RX ADMIN — HYDROMORPHONE HYDROCHLORIDE 0.5 MG: 1 INJECTION, SOLUTION INTRAMUSCULAR; INTRAVENOUS; SUBCUTANEOUS at 07:54

## 2025-03-11 RX ADMIN — HYDROMORPHONE HYDROCHLORIDE 0.5 MG: 1 INJECTION, SOLUTION INTRAMUSCULAR; INTRAVENOUS; SUBCUTANEOUS at 03:14

## 2025-03-11 RX ADMIN — HYDROMORPHONE HYDROCHLORIDE 0.5 MG: 1 INJECTION, SOLUTION INTRAMUSCULAR; INTRAVENOUS; SUBCUTANEOUS at 14:41

## 2025-03-11 RX ADMIN — HYDROMORPHONE HYDROCHLORIDE 0.5 MG: 1 INJECTION, SOLUTION INTRAMUSCULAR; INTRAVENOUS; SUBCUTANEOUS at 18:56

## 2025-03-11 RX ADMIN — HYDROMORPHONE HYDROCHLORIDE 0.5 MG: 1 INJECTION, SOLUTION INTRAMUSCULAR; INTRAVENOUS; SUBCUTANEOUS at 05:01

## 2025-03-11 RX ADMIN — LIDOCAINE 4% 1 PATCH: 40 PATCH TOPICAL at 12:21

## 2025-03-11 RX ADMIN — HYDROMORPHONE HYDROCHLORIDE 0.5 MG: 1 INJECTION, SOLUTION INTRAMUSCULAR; INTRAVENOUS; SUBCUTANEOUS at 10:13

## 2025-03-11 RX ADMIN — OXYCODONE HYDROCHLORIDE 10 MG: 10 TABLET ORAL at 09:31

## 2025-03-11 RX ADMIN — HYDROMORPHONE HYDROCHLORIDE 0.5 MG: 1 INJECTION, SOLUTION INTRAMUSCULAR; INTRAVENOUS; SUBCUTANEOUS at 12:21

## 2025-03-11 RX ADMIN — HYDROMORPHONE HYDROCHLORIDE 0.5 MG: 1 INJECTION, SOLUTION INTRAMUSCULAR; INTRAVENOUS; SUBCUTANEOUS at 17:03

## 2025-03-11 RX ADMIN — HYDROMORPHONE HYDROCHLORIDE 0.5 MG: 1 INJECTION, SOLUTION INTRAMUSCULAR; INTRAVENOUS; SUBCUTANEOUS at 20:33

## 2025-03-11 RX ADMIN — HYDROMORPHONE HYDROCHLORIDE 0.5 MG: 1 INJECTION, SOLUTION INTRAMUSCULAR; INTRAVENOUS; SUBCUTANEOUS at 01:12

## 2025-03-11 ASSESSMENT — ACTIVITIES OF DAILY LIVING (ADL)
ADLS_ACUITY_SCORE: 81
ADLS_ACUITY_SCORE: 81
ADLS_ACUITY_SCORE: 75
ADLS_ACUITY_SCORE: 73
ADLS_ACUITY_SCORE: 75
ADLS_ACUITY_SCORE: 73
ADLS_ACUITY_SCORE: 79
ADLS_ACUITY_SCORE: 73
ADLS_ACUITY_SCORE: 73
ADLS_ACUITY_SCORE: 75
ADLS_ACUITY_SCORE: 73
ADLS_ACUITY_SCORE: 75
ADLS_ACUITY_SCORE: 73
ADLS_ACUITY_SCORE: 73
ADLS_ACUITY_SCORE: 81
ADLS_ACUITY_SCORE: 79
ADLS_ACUITY_SCORE: 73
ADLS_ACUITY_SCORE: 79
ADLS_ACUITY_SCORE: 73

## 2025-03-11 NOTE — PLAN OF CARE
Goal Outcome Evaluation:      Plan of Care Reviewed With: patient    Overall Patient Progress: no changeOverall Patient Progress: no change     Time    /87 (BP Location: Right arm, Cuff Size: Adult Regular)   Pulse 94   Temp 97.4  F (36.3  C) (Oral)   Resp 20   Wt 61.2 kg (134 lb 14.7 oz)   SpO2 93%   BMI 23.90 kg/m      Activity: Delirium  Pain: Patient states they are in pain and they appear uncomfortable and in pain but they aren't able to verbalize a pain rating, pt took prn dilaudid for pain but this didn't lower their pain enough for them to be comfortable, provider was called to bedside to assess, they ordered one time iv dilaudid and a lactic acid  Neuro: Pt is confused and only oriented to self  GI/: Pt has a kent with guop, no bm  Diet: Regular diet  Lines: Port TKO        Continue to monitor and follow POC

## 2025-03-11 NOTE — PROGRESS NOTES
"  PALLIATIVE CARE PROGRESS NOTE  [facility]     Patient Name: Alis Hartman  Date of Admission: 3/4/2025   Today the patient was seen for: goals of care     Recommendations & Counseling       GOALS OF CARE:   Full code- confirmed today  Daughter is open to talking to unit SW about placement and considering hospice enrollment but not made any final decisions  Daughter also hopes to talk to psychiatry about concern for depression driving her lack of desire to eat.  Would have a care conference this week to discuss goals of care with primary team, palliative care, unit SW.    ADVANCE CARE PLANNING:  Previously completed HCD naming Aranza (daughter) as HCA, appears this was not dated and deemed \"invalid.\" Aranza confirmed during last admission that Joy is her surrogate decision maker per notes in chart.   There is a POLST form on file, this was reviewed and current.  Code status: Full Code     MEDICAL MANAGEMENT:   #Pain, chronic, low back and abdominal after surgery in July.   #Pain, L thigh  #Pain, sacral/coccyx wound and B arm and leg wounds  #Sedation  Limited assessment today, was crying out when getting cares just before visit. Otherwise hunched over in bed and not interactive. She has moments where she appears to be in pain alternating with moments where she appears comfortable. She is getting very few doses of her oxycodone given that she is not taking oral meds and is not having any withdrawal symptoms.   - Acetaminophen 975mg q8hr  - Oxycodone to 10mg q3hr prn  - Hydromorphone to 0.5mg q2 hr prn for pain not controlled with PO meds.   - Encourage bowel regimen as she is complaining of abdominal discomfort and constipation may be contributing.      PSYCHOSOCIAL/SPIRITUAL:  Family: Daughter (Joy) and grandchildren, son, nice (Armida)  Joy mentions things being \"God's plan\", though specific [employer] needs/affiliations not addressed " today    Palliative Care will continue to follow. Thank you for the consult and allowing us to aid in the care of Alis Hartman.    These recommendations have been discussed with primary team, psychiatry.    ERINN Vela CNS  [facility], Palliative Care  Securely message with the [employer] Web Console (learn more here) or  Text page via [facility] Paging/Directory        Assessment          Alis Hartman is a 69 year old female with a past medical history of cervical cancer s/p radiation, seriuos adenocarcinoma s/p SNEHA/BSO and cystotomy w/ suprapubic catheter placement 07/2024 and 3 cycles of carbo/taxol (10/2024), hx ESBL urosepsis and bacteremia, RNY gastric bypass, afib on apixaban, HTN, CKD stage 3, and multiple recent admissions (most recently 2/5-2/25 with MADHU, deconditioning, and malnutrition). She is now admitted with concern for UTI and failure to thrive/general weakness.      The patient's daughter Joy met with Dr. Perez of gyn-onc yesterday and goals of care were discussed. She discussed hospice and palliative care has been consulted to continue goals of care discussion, including discussion of hospice and pain management.      We had a care conference on 3/7 and again on 3/10. Daughter (Aranza) expresses that she is very overwhelmed by her mother's condition and the possibility of her dying. Goals are currently restorative though we are continuing to discuss options with Aranza, including hospice.      Today, the patient was seen for:  Goal of care  Family support  Symptom management  Palliative encounter      Interval History:     Multidisciplinary collaboration:  Refusing to take PO medications, refusing also other PO intake such as food/fluids. Weeping edema in LE. Psych to see today and then consider time trial of tube feedings.    Notable medications:  Acetaminophen 975 mg q8h  Hydromorphone IV PRN- 4 mg over last 24 hours  Lidocaine patch  Magic mouthwash scheduled- not  using  Miralax daily- not using  Senna at bedtime  Oxycodone PRN - no use over last 24 hours      Patient/family narrative  Saw today, hunched over in bed, did not respond to this writer.    Called daughter today, reviewed with her the information we have thus far with psychiatry most likely feeling like there is nothing they would offer at this time and discussed then as a result that her lack of desire to eat was multifactorial at this time. We also discussed the MADHU now she is having and despite fluids her kidneys are being affected. Daughter was engaging in discussion about where she would go from here. We discussed LTC and some of her worries about how the experience might be. We discussed that with a hospice focus, that Aranza gets to decide what she wants to do. We discussed feeding on hospice being based on what Aranza wants and if she is hungry. Normalized not being hungry at end of life. Discussed also code status, daughter says she has not finalized decisions and thus wants to keep her full code at this time. We discussed plan being that daughter was going to talk with the unit SW and psychiatry and that we would follow up in the coming days about the discussions.    Review of Systems:     Besides above, ROS was reviewed and is unremarkable        Physical Exam:   Temp:  [96.6  F (35.9  C)-97.4  F (36.3  C)] 96.6  F (35.9  C)  Pulse:  [89-94] 94  Resp:  [13-20] 13  BP: (100-119)/() 119/95  SpO2:  [91 %-100 %] 91 %  134 lbs 14.74 oz    General Appearance:  does not respond to this writer, hunched over.        Data Reviewed:     Results for orders placed or performed during the hospital encounter of 03/04/25 (from the past 24 hours)   Sodium random urine   Result Value Ref Range    Sodium Urine mmol/L 25 mmol/L   Protein  random urine   Result Value Ref Range    Total Protein Urine mg/dL 183.0   mg/dL    Total Protein Urine mg/mg Creat 2.27 (H) 0.00 - 0.20 mg/mg Cr    Creatinine Urine mg/dL 80.6 mg/dL    CBC with Platelets & Differential    Narrative    The following orders were created for panel order CBC with Platelets & Differential.  Procedure                               Abnormality         Status                     ---------                               -----------         ------                     CBC with platelets and ...[[id number]]  Abnormal            Final result                 Please view results for these tests on the individual orders.   Comprehensive metabolic panel   Result Value Ref Range    Sodium 139 135 - 145 mmol/L    Potassium 3.6 3.4 - 5.3 mmol/L    Carbon Dioxide (CO2) 22 22 - 29 mmol/L    Anion Gap 8 7 - 15 mmol/L    Urea Nitrogen 28.1 (H) 8.0 - 23.0 mg/dL    Creatinine 1.29 (H) 0.51 - 0.95 mg/dL    GFR Estimate 44 (L) >60 mL/min/1.73m2    Calcium 8.4 (L) 8.8 - 10.4 mg/dL    Chloride 109 (H) 98 - 107 mmol/L    Glucose 93 70 - 99 mg/dL    Alkaline Phosphatase 174 (H) 40 - 150 U/L    AST 28 0 - 45 U/L    ALT 29 0 - 50 U/L    Protein Total 4.6 (L) 6.4 - 8.3 g/dL    Albumin 2.4 (L) 3.5 - 5.2 g/dL    Bilirubin Total 0.5 <=1.2 mg/dL   Magnesium   Result Value Ref Range    Magnesium 2.1 1.7 - 2.3 mg/dL   Phosphorus   Result Value Ref Range    Phosphorus 3.2 2.5 - 4.5 mg/dL   CBC with platelets and differential   Result Value Ref Range    WBC Count 7.5 4.0 - 11.0 10e3/uL    RBC Count 2.58 (L) 3.80 - 5.20 10e6/uL    Hemoglobin 7.9 (L) 11.7 - 15.7 g/dL    Hematocrit 23.5 (L) 35.0 - 47.0 %    MCV 91 78 - 100 fL    MCH 30.6 26.5 - 33.0 pg    MCHC 33.6 31.5 - 36.5 g/dL    RDW 25.3 (H) 10.0 - 15.0 %    Platelet Count 135 (L) 150 - 450 10e3/uL    % Neutrophils 73 %    % Lymphocytes 19 %    % Monocytes 7 %    % Eosinophils 0 %    % Basophils 0 %    % Immature Granulocytes 1 %    NRBCs per 100 WBC 0 <1 /100    Absolute Neutrophils 5.5 1.6 - 8.3 10e3/uL    Absolute Lymphocytes 1.5 0.8 - 5.3 10e3/uL    Absolute Monocytes 0.5 0.0 - 1.3 10e3/uL    Absolute Eosinophils 0.0 0.0 - 0.7 10e3/uL    Absolute  Basophils 0.0 0.0 - 0.2 10e3/uL    Absolute Immature Granulocytes 0.0 <=0.4 10e3/uL    Absolute NRBCs 0.0 10e3/uL   CBC with Platelets & Differential    Narrative    The following orders were created for panel order CBC with Platelets & Differential.  Procedure                               Abnormality         Status                     ---------                               -----------         ------                     CBC with platelets and ...[[id number]]  Abnormal            Final result                 Please view results for these tests on the individual orders.   Lactic acid whole blood   Result Value Ref Range    Lactic Acid 1.5 0.7 - 2.0 mmol/L   CBC with platelets and differential   Result Value Ref Range    WBC Count 8.1 4.0 - 11.0 10e3/uL    RBC Count 2.63 (L) 3.80 - 5.20 10e6/uL    Hemoglobin 8.2 (L) 11.7 - 15.7 g/dL    Hematocrit 24.8 (L) 35.0 - 47.0 %    MCV 94 78 - 100 fL    MCH 31.2 26.5 - 33.0 pg    MCHC 33.1 31.5 - 36.5 g/dL    RDW 25.4 (H) 10.0 - 15.0 %    Platelet Count 148 (L) 150 - 450 10e3/uL    % Neutrophils 73 %    % Lymphocytes 19 %    % Monocytes 7 %    % Eosinophils 0 %    % Basophils 0 %    % Immature Granulocytes 1 %    NRBCs per 100 WBC 0 <1 /100    Absolute Neutrophils 5.9 1.6 - 8.3 10e3/uL    Absolute Lymphocytes 1.6 0.8 - 5.3 10e3/uL    Absolute Monocytes 0.6 0.0 - 1.3 10e3/uL    Absolute Eosinophils 0.0 0.0 - 0.7 10e3/uL    Absolute Basophils 0.0 0.0 - 0.2 10e3/uL    Absolute Immature Granulocytes 0.0 <=0.4 10e3/uL    Absolute NRBCs 0.0 10e3/uL     Recent Labs   Lab 03/11/25  0507 03/11/25  0309 03/10/25  0529 03/09/25  0734   WBC 8.1 7.5 6.9 8.8   HGB 8.2* 7.9* 7.3* 8.2*   MCV 94 91 91 93   * 135* 118* 131*   NA  --  139 138 139   POTASSIUM  --  3.6 3.6 3.6   CHLORIDE  --  109* 109* 108*   CO2  --  22 22 19*   BUN  --  28.1* 27.2* 26.9*   CR  --  1.29* 1.27* 1.23*   ANIONGAP  --  8 7 12   SAMUEL  --  8.4* 8.3* 8.4*   GLC  --  93 89 80   ALBUMIN  --  2.4* 2.3* 2.4*    PROTTOTAL  --  4.6* 4.4* 4.5*   BILITOTAL  --  0.5 0.4 0.4   ALKPHOS  --  174* 153* 139   ALT  --  29 27 29   AST  --  28 34 46*       No results found for this or any previous visit (from the past 24 hours).      Medical Decision Making       100 MINUTES SPENT BY ME on the date of service doing chart review, history, exam, documentation & further activities per the note.

## 2025-03-11 NOTE — PLAN OF CARE
Goal Outcome Evaluation:      Plan of Care Reviewed With: patient    Overall Patient Progress: declining    [id number]: Patient VSS on room air. Pt hypothermic in AM with a temp of 96.6, provider notified, tino castro ordered and placed on patient, temp improved to 97.2. Patient refusing a lot of cares today. Patient refused oral medications, she was agreeable to take oxycodone once during the shift. Patient refused skin assessment, and with patient only oriented to self, we were unable to complete admission questions at this time. Patient refused turns, helped reposition her when she was agreeable. Incontinence care completed, CHG wipes done. Pt incontinent of stool once, smear size. Patient's port needle and dressing needs to be changed, was unable to do at this time due to patient being slouched over. When we tried placing patient flat she started screaming in pain and refused port changes. Provider notified about situation. Patient in extreme pain, IV dilaudid given every 2 hours. Lidocaine patch ordered and place on patient's lower back. Patient had low urine output, provider paged, renal consulted. Patient now usha counts due to low intake. Strict Is Os. Not oob. Continue with plan of care.     Provider in contact with during the shift was Dr. Deepali Negrete.

## 2025-03-11 NOTE — PLAN OF CARE
[id number]    Transferred to . VSS, afebrile and on RA. Unable to assess orientation as pt was unwilling to answer questions. Pt not answering pain/nausea assessment questions, occasionally mumbling incoherently. Port infusing with LR running at TKO. BLE edematous, unable to assess wounds at this time. Siderail up x3 and BA on for safety. Provider notified of transfer. Continue w/ POC.       Goal Outcome Evaluation:      Plan of Care Reviewed With: patient    Overall Patient Progress: no changeOverall Patient Progress: no change    Outcome Evaluation: Pt admitted to

## 2025-03-11 NOTE — PROGRESS NOTES
"PALLIATIVE CARE SOCIAL WORK Progress Note   Location: [facility]      Telephone Call     Joint phone call this afternoon with palliative care NP and dtr Joy.    Joy continued to demonstrate feeling overwhelmed by medical decision-making for her mother. Continued to verbalize that she understood concerns and recommendations being made by medical teams.   Again appeared near ready to make decisions re plan of care but would stop short of decision-making, including making changes to code status- even stating when educated around risk/benefit of resuscitation, \"I know it might hurt her but I'd want to try.\"    PCSW validated that Joy appeared to be feeling the weight of making a decision for her mother and attempted to re-frame that while it feels like the decision is hers, Aranza has been giving her indications of what she does not want (eg not eating/drinking, taking medications). Joy able to acknowledge she knows her mom is not well and is tired though could not move toward a decision that was anything other than a restorative path.    Interventions used today: active and reflective listening, validation, re-framing, continued trust and rapport building    Plan: PCSW will continue to follow while palliative care team remains involved; continued assessment of dtr's coping as well as emotional and decisional support needs.    Clinical Social Work Interventions:   Facilitation of processing of thoughts/feelings  Goals of care discussion/facilitation    WILLIAM Vargas, [facility]  [facility], Palliative Care  Securely message with the [employer] Web Console (learn more here) or  Text page via [employer] Paging/Directory       "

## 2025-03-11 NOTE — PROVIDER NOTIFICATION
[id number] TW. Hey I have a lot of concerns about this patient, do you think you could give me a call? I paged on vocecra but don't think you're signed in yet. Thank you, Amanda [phone]    Provider Deepali Negrete called nurse.

## 2025-03-11 NOTE — PLAN OF CARE
Goal Outcome Evaluation:      Plan of Care Reviewed With: patient, child    Overall Patient Progress: no change     BP (!) 114/104 (BP Location: Right arm, Patient Position: Semi-Morrow's, Cuff Size: Adult Small)   Pulse 94   Temp 97.1  F (36.2  C) (Tympanic)   Resp 20   Wt 61.2 kg (134 lb 14.7 oz)   SpO2 100%   BMI 23.90 kg/m      Neuro: A&Ox1/2. Garbled speech.  Cardiac: SR. VSS.   Respiratory: Sating 100 on RA.  GI/: Low output from suprapubic cath. Pt constipated.  Diet/appetite: Regular diet. Minimal appetite, daughter was able to get her to drink some fluids.  Activity:  Assist of 2, not oob. Refusing turns.  Pain: IV Dilaudid for pain, refusing oral.  Skin: Edema on legs and feet with weeping. Mepi changed.  LDA's: Port infusing TKO LR    Plan: Transferred to  w/ belongings. Continue with POC. Notify primary team with changes.

## 2025-03-11 NOTE — PROGRESS NOTES
"[facility]    Medicine Progress Note - Hospitalist Service, GOLD TEAM 8    Date of Admission:  3/4/2025    Assessment & Plan   \"Aranza\" Alis Hartman is a 69 year old woman admitted on 2/5/2025. 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  Patient complicated by MADHU and hypervolemia, acute on chronic pain, difficulty neurocognitive status, decreased oral intake and ongoing discussion of goals of care.        Today:   - Nephrology consult   - Urinalysis and urine creatinine ordered, patient having very minimal urine output, monitor I's and O's closely  - Ordered calorie counts to closely monitor oral intake  - Psych consulted, awaiting final recommendations  - Appreciate palliative care assistance with ongoing discussion of goals of care, pending psychiatric evaluation  - Added lidocaine patch for back pain, has IV Dilaudid available      MADHU, oliguric  Hypervolemia  Creatinine at baseline 0.8-0.9 and admission.  Trended up from 3/06, then peaked at 1.29 on 2/08, trended down and starting to trend up again 3/10.  Urine protein creatinine ratio 2.7.  Now at 1.25, likely prerenal v/s ATN 2/2 abx   Renal US normal  Was on IV fluids, stopped 3/11  - Nephrology consult  - Repeat urinalysis and urine creatinine ordered.  Consider Lasix pending nephrology evaluation.  - Strict I's and O's, daily weights  - Monitor on BMP    Confusion and Possible paranoia with suspected hallucinations  Delirium v/s Metabolic Encephalopathy "   Schizophrenia   RAFAEL   Unspecified neurocognitive Disorder: MOCA 25/30 9/22/22   On admission patient's family was concerned about her confusion. Patient has paranoia with suspected hallucinations. Acute worsening was likely related to possible UTI v/s dehydration lack of PO. B12 FOLATE, ammonia all normal  Initially considered CT head but patient is unable to lie flat. And also has come back to baseline after fluids, pain control.   Her mentation during this hospitalization is waxing and waning which points towards delirium   If mentation worsens can consider CT head  Patient's family is now open to hearing from psychiatry if the patient has any psychiatric conditions that could be treated medically with the hope that her overall condition (nutritional included) could improve.  The daughter would like to be notified of any medication recommendations before starting it.  - Psych consulted, awaiting final recommendations  - Delirium precautions     Serous endometrial carcinoma  Follows w/ heme/onc through [facility]. S/p SNEHA, BSO 07/2024 s/p cycle 3 carbo/taxel 10/15/2024, cycle 4 delayed in s/o recent admission, family ultimately decided to forgo any further treatment.   During care conference, it was expressed that, her cancer is likely to recur due to it being an aggressive type but when it will recur is unknown. However, should it recur, GynOnc team expressed that, because of her functional status (significant weakness) and malnutrition, treatment would not be recommended since that would worsen her health overall and benefits would not outweigh the risks.  -Gyn/onc following      Acute on Chronic Pain  Patient reporting thigh, low back pain related to her coccyx wound mentioned below  - Oxycodone 10 mg q3h prn oral  - IV dilaudid 0.5 mg Q2H prn for breakthrough pain   - Lidocaine patch     Goals of care   Recurrent hospitalizations with progressive step-wise decline  Severe Protein-Calorie Malnutrition   Advanced  care planning Discussion  Sacral pressure ulcer   - Nutrition consult   - Calorie Counts ordered for 3/11-3/15  - WOC consult   - Palliative consulted, appreciate assistance with GOC discussions-will follow-up with family 03/11 pending psych evaluation.  Will plan for another goals of care conversation in the coming days.     Afib   Rate controlled   Prior admission had risk/benefit discussion and discontinued AC due to vaginal bleeding      --------------------------------------------------------------  Pyuria   Hx of ESBL urosepsis and bacteremia  Bladder dysfunction s/p cystostomy and suprapubic catheter  Recently admitted 11/26 - 12/8/2024 for ESBL urosepsis and bacteremia requiring ICU w/ L nephrostomy tube (removed 1/7) treated w/ ertapenem, returned 1/25-1/27 for concern for UTI but no clear infection at that time.  ID was consulted that admission and recommended avoiding frequent UA and Ucx checks in future unless patient w/ symptoms of UTI. This admission increased confusion, poor appetite and increased generalized weakness, foul smelling urine. CT abdomen unremarkable. Not septic on presentation. U/A showed growing [id number] mixed elisa. U/A collected from exchanged kent in the ED 3/4. S/p unasyn (3/4-3/5). S/p zosyn (3/5-3/6)     Thrombocytopenia, resolved   Platelets around 140, likely related to infection and deconditioning   - CTM          Diet: Combination Diet Regular Diet Adult  Snacks/Supplements Adult: Ensure Enlive; With Meals  Snacks/Supplements Adult: Boost Soothe; Between Meals    DVT Prophylaxis: Enoxaparin (Lovenox) SQ  Kent Catheter: Not present  Lines: PRESENT      Port a Cath 02/05/25 Single Lumen Right Chest wall-Site Assessment: WDL      Cardiac Monitoring: None  Code Status: Full Code      Clinically Significant Risk Factors          # Hyperchloremia: Highest Cl = 109 mmol/L in last 2 days, will monitor as appropriate          # Hypoalbuminemia: Lowest albumin = 2.3 g/dL at  3/10/2025  5:29 AM, will monitor as appropriate   # Thrombocytopenia: Lowest platelets = 118 in last 2 days, will monitor for bleeding   # Hypertension: Noted on problem list             # Severe Malnutrition: based on nutrition assessment and treatment provided per dietitian's recommendations.    # Financial/Environmental Concerns: none         Social Drivers of Health            Disposition Plan     Medically Ready for Discharge: Anticipated in 5+ Days             Deepali Negrete MD  Hospitalist Service, GOLD TEAM 8  [facility]  Securely message with [employer] (more info)  Text page via [employer] Paging/Directory   See signed in provider for up to date coverage information  ______________________________________________________________________    Interval History   Notes reviewed, no acute overnight events.  This morning she is in the bed, hunched over, screaming.  Reporting back pain.  Denies pain anywhere else.  Denies abdominal pain, chest pain, shortness of breath.  Attempted to reposition patient with nursing which helped to resolve some of her discomfort.    Physical Exam   Vital Signs: Temp: (!) 96.2  F (35.7  C) Temp src: Axillary BP: 100/87 Pulse: 89   Resp: 20 SpO2: 100 % O2 Device: None (Room air)    Weight: 134 lbs 14.74 oz    General Appearance: Awake, Alert, Oriented, hunched over, reporting back pain  HEENT: MMM, EOMI, conjunctiva clear  Chest: Right chest port catheter in place  Respiratory: Breathing comfortably on room air. Lungs are clear to auscultation bilaterally.    Cardiovascular: Regular rate and rhythm, extremities perfused.    GI: Normal bowel sounds, Soft, nontender, nondistended.  Suprapubic catheter in place draining scant amount of urine  Extremities: Bilateral lower extremity edema up to hips.  Neurology: AOX3, moving all extremities appropriately       Medical Decision Making       90 MINUTES SPENT BY ME on the date of service doing chart  review, history, exam, documentation & further activities per the note.      Data   ------------------------- PAST 24 HR DATA REVIEWED -----------------------------------------------    I have personally reviewed the following data over the past 24 hrs:    8.1  \   8.2 (L)   / 148 (L)     139 109 (H) 28.1 (H) /  93   3.6 22 1.29 (H) \     ALT: 29 AST: 28 AP: 174 (H) TBILI: 0.5   ALB: 2.4 (L) TOT PROTEIN: 4.6 (L) LIPASE: N/A     Procal: N/A CRP: N/A Lactic Acid: 1.5         Imaging results reviewed over the past 24 hrs:   No results found for this or any previous visit (from the past 24 hours).

## 2025-03-11 NOTE — CONSULTS
"          Psychiatry Consultation; Follow up              Reason for Consult, requesting source:    Current psychiatric status? I have seen her a number of times before, most recently 12/5/24.  Requesting source: Deepali Negrete    Labs and imaging reviewed, discussed with nursing     Total time spent in chart review, patient interview and coordination of care; 65 min            Interim history:    Per EMR:   \"\"Aranza\" Alis Hartman is a 69 year old woman admitted on 2/5/2025. 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 We discussed code status and recommended DNR/DNI but Joy would like Aranza to remain full code until she speaks to her family about that further.   Joy again expresses that she is overwhelmed by the situation and the idea of her mother dying. She again wants to discuss with her family, specifically her cousin and brother. She was planning to go reach out to her family and \"call a family meeting\" tonight. In the meantime we made plan to proceed with psychiatry consult to reassess role of psychiatric medications to help with her mental status as well as consideration of a time limited trial of a feeding tube as long as Aranza agrees/allows it to be placed. \"    She carries a diagnosis of schizophrenia and had been on Seroquel and Zoloft for many years, and had been followed by [facility] for psychiatric cares. However, " according to her daughter she was frequently noncompliant with her medications and had not had psychosis symptoms for quite a long time, although during my visit 12/5/24 she may have been hearing voices, but not significant, no command hallucinations. Her daughter had made it quite clear that she would prefer that Aranza not receive antipsychotics.     During my visit today Aranza was slumped over in bed with her head down. She had asked for help sitting up, but even with the assistance of nursing we were unable to get her to sit up.   She denied being depressed, denied and voices and I could not elicit any paranoia symptoms.   She could not answer questions regarding orientation, etc.   I returned to see her later in the day and she was slumped over and would not respond to my questions.           Current Medications:     Current Facility-Administered Medications   Medication Dose Route Frequency Provider Last Rate Last Admin    acetaminophen (TYLENOL) tablet 975 mg  975 mg Oral Q8H Salvador Dickerson MD   975 mg at 03/09/25 2302    artificial saliva (BIOTENE MT) solution 1 spray  1 spray Mouth/Throat 4x Daily Salvador Dickerson MD   1 spray at 03/10/25 1945    enoxaparin ANTICOAGULANT (LOVENOX) injection 40 mg  40 mg Subcutaneous Q24H Salvador Dickerson MD   40 mg at 03/09/25 0801    Lidocaine (LIDOCARE) 4 % Patch 1 patch  1 patch Transdermal Q24H Deepali Negrete MD        magandre mouthwash suspension (diphenhydramine, lidocaine, aluminum-magnesium & simethicone)  10 mL Swish & Spit TID AC Salvador Dickerson MD   10 mL at 03/08/25 0948    polyethylene glycol (MIRALAX) Packet 17 g  17 g Oral Daily Piter Webber MD   17 g at 03/09/25 1656    senna-docusate (SENOKOT-S/PERICOLACE) 8.6-50 MG per tablet 1 tablet  1 tablet Oral At Bedtime Piter Webber MD   1 tablet at 03/10/25 2150    sodium chloride (PF) 0.9% PF flush 3 mL  3 mL Intracatheter Q8H Salvador Dickerson MD   3 mL at 03/07/25 0824            MSE:  "  Appearance: fatigued and cachectic  Attitude:  uncooperative  Eye Contact:  poor   Mood:  \"fair\"  Affect:  quite restricted   Speech:  sparse   Psychomotor Behavior:  no evidence of tardive dyskinesia, dystonia, or tics and physical retardation  Muscle strength and tone: intact   Thought Process:  sparse   Associations:  no loose associations  Thought Content:  no evidence of suicidal ideation or homicidal ideation and no evidence of psychotic thought  Insight:  limited  Judgement:  limited  Oriented to:  n/a  Attention Span and Concentration:  Unable to assess    Recent and Remote Memory:  unable to assess     MOCA 25/30 9/22      Vital signs:  Temp: 96.6  F (35.9  C) Temp src: Temporal BP: (!) 119/95 Pulse: 94   Resp: 13 SpO2: (!) 91 % O2 Device: None (Room air) Oxygen Delivery: 2 LPM   Weight: 61.2 kg (134 lb 14.7 oz)  Estimated body mass index is 23.9 kg/m  as calculated from the following:    Height as of 2/5/25: 1.6 m (5' 3\").    Weight as of this encounter: 61.2 kg (134 lb 14.7 oz).           DSM-5 Diagnosis:   Past diagnosis of Schizophrenia  RAFAEL   Unspecified neurocognitive Disorder: MOCA 25/30 9/22/22  Recent delirium          Assessment:   I spoke with her daughter by phone about options for psych intervention. She is concerned that Aranza is frightened and hallucinating at times. Due to this and poor appetite she would be ok with us using low dose Zyprexa if it would help appetite, but she wants to see her again before we start anything.             Summary of Recommendations:   If her daughter gives it the ok, then I would start Zyprexa Zydis 2.5 mg BID.       Contact me as needed.  Mian Serna M.D.   Consult Liaison Psychiatry   [facility]   Contact on [facility]  If I am not available, then [facility] line ([phone]) should know who   Is covering our consult service.       \"Much or all of the text in this note was generated through the use of Dragon Dictate " "voice to text software. Errors in spelling or words which appear to be out of contact are unintentional, may be present due having escaped editing\"           "

## 2025-03-12 ENCOUNTER — DOCUMENTATION ONLY (OUTPATIENT)
Dept: ONCOLOGY | Facility: CLINIC | Age: 70
End: 2025-03-12
Payer: COMMERCIAL

## 2025-03-12 LAB
ALBUMIN SERPL BCG-MCNC: 2.3 G/DL (ref 3.5–5.2)
ALP SERPL-CCNC: 167 U/L (ref 40–150)
ALT SERPL W P-5'-P-CCNC: 26 U/L (ref 0–50)
ANION GAP SERPL CALCULATED.3IONS-SCNC: 9 MMOL/L (ref 7–15)
AST SERPL W P-5'-P-CCNC: 24 U/L (ref 0–45)
BILIRUB SERPL-MCNC: 0.5 MG/DL
BUN SERPL-MCNC: 31.7 MG/DL (ref 8–23)
CALCIUM SERPL-MCNC: 8.2 MG/DL (ref 8.8–10.4)
CHLORIDE SERPL-SCNC: 110 MMOL/L (ref 98–107)
CREAT SERPL-MCNC: 1.51 MG/DL (ref 0.51–0.95)
CREAT UR-MCNC: 79.5 MG/DL
EGFRCR SERPLBLD CKD-EPI 2021: 37 ML/MIN/1.73M2
GLUCOSE SERPL-MCNC: 86 MG/DL (ref 70–99)
HCO3 SERPL-SCNC: 22 MMOL/L (ref 22–29)
MAGNESIUM SERPL-MCNC: 2.2 MG/DL (ref 1.7–2.3)
MICROALBUMIN UR-MCNC: 718 MG/L
MICROALBUMIN/CREAT UR: 903.14 MG/G CR (ref 0–25)
PHOSPHATE SERPL-MCNC: 3.5 MG/DL (ref 2.5–4.5)
POTASSIUM SERPL-SCNC: 3.6 MMOL/L (ref 3.4–5.3)
PROT SERPL-MCNC: 4.6 G/DL (ref 6.4–8.3)
SODIUM SERPL-SCNC: 141 MMOL/L (ref 135–145)

## 2025-03-12 PROCEDURE — 80053 COMPREHEN METABOLIC PANEL: CPT

## 2025-03-12 PROCEDURE — 83735 ASSAY OF MAGNESIUM: CPT

## 2025-03-12 PROCEDURE — 99233 SBSQ HOSP IP/OBS HIGH 50: CPT | Performed by: INTERNAL MEDICINE

## 2025-03-12 PROCEDURE — 250N000013 HC RX MED GY IP 250 OP 250 PS 637: Performed by: INTERNAL MEDICINE

## 2025-03-12 PROCEDURE — P9047 ALBUMIN (HUMAN), 25%, 50ML: HCPCS | Performed by: STUDENT IN AN ORGANIZED HEALTH CARE EDUCATION/TRAINING PROGRAM

## 2025-03-12 PROCEDURE — 250N000011 HC RX IP 250 OP 636: Performed by: INTERNAL MEDICINE

## 2025-03-12 PROCEDURE — 84100 ASSAY OF PHOSPHORUS: CPT

## 2025-03-12 PROCEDURE — 250N000011 HC RX IP 250 OP 636: Mod: JZ | Performed by: PHARMACIST

## 2025-03-12 PROCEDURE — 250N000011 HC RX IP 250 OP 636: Performed by: STUDENT IN AN ORGANIZED HEALTH CARE EDUCATION/TRAINING PROGRAM

## 2025-03-12 PROCEDURE — 99232 SBSQ HOSP IP/OBS MODERATE 35: CPT | Performed by: STUDENT IN AN ORGANIZED HEALTH CARE EDUCATION/TRAINING PROGRAM

## 2025-03-12 PROCEDURE — 120N000002 HC R&B MED SURG/OB UMMC

## 2025-03-12 PROCEDURE — 99223 1ST HOSP IP/OBS HIGH 75: CPT | Mod: GC | Performed by: INTERNAL MEDICINE

## 2025-03-12 RX ORDER — ALBUMIN (HUMAN) 12.5 G/50ML
25 SOLUTION INTRAVENOUS ONCE
Status: COMPLETED | OUTPATIENT
Start: 2025-03-12 | End: 2025-03-12

## 2025-03-12 RX ORDER — ALBUMIN (HUMAN) 12.5 G/50ML
100 SOLUTION INTRAVENOUS ONCE
Status: DISCONTINUED | OUTPATIENT
Start: 2025-03-12 | End: 2025-03-12

## 2025-03-12 RX ADMIN — ALBUMIN HUMAN 25 G: 0.25 SOLUTION INTRAVENOUS at 13:28

## 2025-03-12 RX ADMIN — HYDROMORPHONE HYDROCHLORIDE 0.5 MG: 1 INJECTION, SOLUTION INTRAMUSCULAR; INTRAVENOUS; SUBCUTANEOUS at 17:05

## 2025-03-12 RX ADMIN — OXYCODONE HYDROCHLORIDE 10 MG: 10 TABLET ORAL at 14:47

## 2025-03-12 RX ADMIN — ALBUMIN HUMAN 25 G: 0.25 SOLUTION INTRAVENOUS at 14:37

## 2025-03-12 RX ADMIN — ACETAMINOPHEN 975 MG: 325 TABLET, FILM COATED ORAL at 14:47

## 2025-03-12 RX ADMIN — HYDROMORPHONE HYDROCHLORIDE 0.5 MG: 1 INJECTION, SOLUTION INTRAMUSCULAR; INTRAVENOUS; SUBCUTANEOUS at 01:21

## 2025-03-12 RX ADMIN — ALBUMIN HUMAN 25 G: 0.25 SOLUTION INTRAVENOUS at 14:05

## 2025-03-12 RX ADMIN — Medication 1 SPRAY: at 17:05

## 2025-03-12 RX ADMIN — ENOXAPARIN SODIUM 40 MG: 40 INJECTION SUBCUTANEOUS at 08:42

## 2025-03-12 RX ADMIN — Medication 1 SPRAY: at 20:18

## 2025-03-12 RX ADMIN — DIPHENHYDRAMINE HYDROCHLORIDE AND LIDOCAINE HYDROCHLORIDE AND ALUMINUM HYDROXIDE AND MAGNESIUM HYDRO 10 ML: KIT at 17:05

## 2025-03-12 RX ADMIN — HYDROMORPHONE HYDROCHLORIDE 0.5 MG: 1 INJECTION, SOLUTION INTRAMUSCULAR; INTRAVENOUS; SUBCUTANEOUS at 05:26

## 2025-03-12 RX ADMIN — HYDROMORPHONE HYDROCHLORIDE 0.5 MG: 1 INJECTION, SOLUTION INTRAMUSCULAR; INTRAVENOUS; SUBCUTANEOUS at 20:18

## 2025-03-12 RX ADMIN — ALBUMIN HUMAN 25 G: 0.25 SOLUTION INTRAVENOUS at 12:50

## 2025-03-12 RX ADMIN — ACETAMINOPHEN 325 MG: 325 TABLET, FILM COATED ORAL at 08:35

## 2025-03-12 ASSESSMENT — ACTIVITIES OF DAILY LIVING (ADL)
ADLS_ACUITY_SCORE: 92
ADLS_ACUITY_SCORE: 98
ADLS_ACUITY_SCORE: 98
ADLS_ACUITY_SCORE: 81
ADLS_ACUITY_SCORE: 81
FALL_HISTORY_WITHIN_LAST_SIX_MONTHS: NO
ADLS_ACUITY_SCORE: 98
ADLS_ACUITY_SCORE: 92
ADLS_ACUITY_SCORE: 98
ADLS_ACUITY_SCORE: 92
ADLS_ACUITY_SCORE: 98
ADLS_ACUITY_SCORE: 81
ADLS_ACUITY_SCORE: 98
ADLS_ACUITY_SCORE: 98
ADLS_ACUITY_SCORE: 92
ADLS_ACUITY_SCORE: 98
ADLS_ACUITY_SCORE: 92
ADLS_ACUITY_SCORE: 92

## 2025-03-12 NOTE — CONSULTS
Discharge Pharmacy Test Claim    Buprenorphine patches require prior authorization through patient's [facility] Dual Medicare advantage plan. Pharmacy liaison submitted a PA request.    Addendum 1:04 pm: buprenorphine PA approved with $0 copay.    Test Claim Copay   buprenorphine patch 0.00     Padmini Perez  [facility] Pharmacy Liaison, [place]  Ph: [phone]  Fax: [phone]  Available on Teams and [employer]  Disclaimer: Pharmacy test claims are estimates and may not reflect final costs. Suggested alternatives aim to be cost-effective and may not be therapeutically equivalent. This consult is informational and does not constitute medical advice. Clinical decisions should be made by qualified healthcare providers.

## 2025-03-12 NOTE — PLAN OF CARE
Goal Outcome Evaluation: [id number]      Plan of Care Reviewed With: patient    Overall Patient Progress: declining    Neuro: Pt appears frustrated, in distress, and frequently yells out. Not cooperative with several cares, not able to follow instruction. Confused, oriented to self only.  Respiratory: WDL.  Cardiac: Intermittent tachycardia noted.  GI: Known to be incontinent of bowel. No BM tonight.  : Suprapubic catheter in place draining small UOP, very cloudy and rahel-yellow in color.  Lines: Port, saline locked, pt minimally allows access.  Drains: Urinary catheter.  Last vitals: Temp: 97.7  F (36.5  C) Temp src: Temporal BP: 99/81 Pulse: 105   Resp: 20 SpO2: 100 % O2 Device: None (Room air)     Pain: Rated with rFLACC scale; managed with IV Dilaudid when severe because pt refusing oral medications.  Mobility: Assist of 2 in bed; not OOB.  Continue to follow POC.

## 2025-03-12 NOTE — PROGRESS NOTES
"Care Management Follow Up    Length of Stay (days): 8    Expected Discharge Date: 03/14/2025     Concerns to be Addressed:    Care Conference & discharge planning   Patient plan of care discussed at interdisciplinary rounds: Yes    Anticipated Discharge Disposition:  TBD        Anticipated Discharge Services:  TBD  Anticipated Discharge DME:  TBD    Patient/family educated on Medicare website which has current facility and service quality ratings:    Education Provided on the Discharge Plan:    Patient/Family in Agreement with the Plan:      Referrals Placed by CM/SW:    Private pay costs discussed: Not applicable    Discussed  Partnership in Safe Discharge Planning  document with patient/family: No     Handoff Completed: No, handoff not indicated or clinically appropriate    Additional Information:  SW following for discharge planning. BANDAR requested by [facility] to set up care conference for tomorrow with pt daughter (Joy) and Palliative.     BANDAR spoke with Joy via phone call to schedule care conference. Joy requested a few minutes to get to a private spot. Joy explained that there are \"a lot of voices\" within pt's family. Joy shared that her and her son came to visit pt last night which went well. Joy confirmed that she is available for a care conference tomorrow (3/13) at 1330. Joy reports that she will be at the hospital for care conference tomorrow and that it will likely be just her attending. Joy also had some questions about different possible discharge options and confirmed that she is currently deciding on whether she would like pt admitted to hospice or not. BANDAR provided brief education on hospice discharge options (LTC with hospice vs home). Joy reports that there are about 10 family members, including 2-3 RN relatives that are offering help so she is interested in what home would look like. BANDAR shared that pt would need 24/7 support and noted that it appears from RN notes " "that hospital staff is using 2 people to assist pt with bed mobility. SW provided brief education on hospice services in the home. SW also discussed LTC with hospice and services provided by this. Pt has a [facility] health insurance policy which should pay for pt's cares at a SNF. Joy also asked what it might look like if family did not want hospice. BANDAR shared that SW team would likely need PT/OT teams to weigh in on recommendations first. Joy reported that pt has been to [facility] in the past which was \"horrible\". Joy noted that she understands not all SNFs are the same but noted that she would likely want to do more research before sending a patient to a SNF. Joy thanked BANDAR for time and noted that she was not feeling so overwhelmed while talking to SW. Joy noted that when she talks to family she becomes more overwhelmed. BANDAR provided supportive listening and supported Joy to continue to make decisions for pt that she believes would be the pt's wishes and in pt's best wishes.     ADDENDUM 1302: BANDAR notified that [facility] 8 scheduled care conference with Joy and Palliative for tomorrow (3/13) at 1200.    Next Steps: CM team to continue to follow. Goals of Care conference tomorrow (3/13) with Gold 8, Palliative and pt daughter (Joy) at 1200.     FIFI Shah   [facility]   Available via [employer]  Covering 5A BANDAR, ph: [phone]      "

## 2025-03-12 NOTE — PROGRESS NOTES
"CLINICAL NUTRITION SERVICES - REASSESSMENT NOTE     RECOMMENDATIONS FOR MDs/PROVIDERS TO ORDER:  Defer appropriateness of placing feeding tube to start TF to providers pending ongoing GOC conversations    Malnutrition Status:    Severe malnutrition in the context of chronic illness    Registered Dietitian Interventions:  Weigh patient    Future/Additional Recommendations:  -- Monitor PO intakes, wt trends, overall GOC/POC  -- If plan to start TF, please send consult Registered Dietitian to Assess and Order TF per Medical Nutrition Therapy Guidelines     SUBJECTIVE INFORMATION  Patient not available for interview due to pt not appropriate for conversation with RD at this time (was crying out and needing nursing care during my attempt to visit)    CURRENT NUTRITION ORDERS  Diet: Regular  -- Supplements: Ensure Enlive TID with meals; Boost Soothe daily between meals (1x/day)  -- Calorie Counts 3/11-3/15      CURRENT INTAKE/TOLERANCE  Poor appetite and eating 0-25% of meals documented in flowsheets the past week. Pt refusing food per RN note last night. Appropriateness of NG tube for TF being discussed between providers and family per notes. Per provider note today, \"She does report that she is hungry, but however she is unable to elaborate why she is not eating or refusing cares. \". Pt is oriented to self and location per provider note today. Per palliative SW note on 3/10, pt's daughter was attempting to help pt take sips of a milkshake but was unsuccessful.     Calorie Counts  3/11: no food intake recorded (2 meals ordered from kitchen)    NEW FINDINGS  Weight: only 1 wt recorded this admit (61.2 kg/134 lb 14.7 oz on 3/6)    Skin/wounds: per last WOC note on 3/7 pt with wounds due to edema blisters on bilateral legs; pt also with wound on sacrum/coccyx due to pressure injury and shear (per WOC note: \"Pressure Injury Stage: site of previously healed pressure injury, present on admission     Wound history/plan of care: " "  unknown worst stage, currently appears to be a stage 2 but was possibly deeper. Was noted as a reinjury on her last assessment here 12/4/24 and 1/27/25\")    GI symptoms: last BM 3/11    Nutrition-relevant labs: Reviewed  - K+, Mg++, and Phos WNL  - BUN 31.7 (H), trending up; Cr 1.51 (H), trending up; GFR 37 (L), trending down --> MADHU per medicine provider note yesterday (and CKD 3 listed in PMH)    Nutrition-relevant medications: Reviewed    MALNUTRITION  % Intake: </= 50% for >/= 5 days (severe)  % Weight Loss: > 5% in 1 month (severe)   Subcutaneous Fat Loss: Triceps: Severe per RD note on 3/5  Muscle Loss: Wasting of the temples (temporalis muscle): Severe and Scapula (latissimus dorsi, trapezious, deltoids): Severe per RD note on 3/5  Fluid Accumulation/Edema: Moderately severe, 3+ per flowsheets  Malnutrition Diagnosis: Severe malnutrition in the context of chronic illness  Malnutrition Present on Admission: Yes    EVALUATION OF THE PROGRESS TOWARD GOALS   Previous Goals  Patient to consume % of nutritionally adequate meal trays TID, or the equivalent with supplements/snacks.   Evaluation: Not progressing    Previous Nutrition Diagnosis  Inadequate oral intake related to poor appetite as evidenced by 10.4 kg (15%) weight loss over ~ 1 month.   Evaluation: No change, updated    NUTRITION DIAGNOSIS  Inadequate oral intake related to poor appetite and suspect AMS hindering PO as evidenced by eating 0-25% of meals documented in flowsheets and consistently poor appetite documented per progress notes the past week    INTERVENTIONS  Chart review, pt not appropriate for visit with RD at this time    Goals  Patient to consume % of nutritionally adequate meal trays TID, or the equivalent with supplements/snacks.     Monitoring/Evaluation      Progress toward goals will be monitored and evaluated per policy.     Temitope Vasquez, RD, , LD  Weekday Units covered: 5A (non-Heme Onc pts) and 7B " ([id number])  Available by 5A or 7B Clinical Dietjose Garcia  Weekend Coverage: Weekend Clinical Dietjose Garcia    Inpatient Clinical Dietitians no longer available via paging

## 2025-03-12 NOTE — PROGRESS NOTES
Calorie Count  Intake recorded for: 3/11  Total Kcals: 0 Total Protein: 0g  Kcals from Hospital Food: 0   Protein: 0g  Kcals from Outside Food (average):0 Protein: 0g  # Meals Ordered from Kitchen: 2 meals   # Meals Recorded: no intake recorded.   # Supplements Recorded: no intake recorded.

## 2025-03-12 NOTE — CONSULTS
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 Hospital stay complicated with MADHU and increased LE swelling and anasarca.    ##MADHU:  --Multifactorial; likely 2/2 poor oral intake, FTT, and deconditioing causing ischemic ATN.  --Pt did not respond to IV fluids and started to have 3rd spacing, worsening LE edema.  --Renal imaging ruled out obstruction and hydronephrosis.  --UA showed large blood, LE +ve, WBC >182, RBC >182, Moderate bacteria, yeast +ve, hyaline cast +ve and urine specific gravity 1.02.  --Urine Na 25, indicating hypovolemia.  --Recommended supporting intravascular volume by giving Albumin 100g 25% once today and monitoring kidney function, albumin level is 2.3; pt likely malnourished.  --Last TTE back in 11/2024 had EF 60-65%, however given the worsening LE edema post IV fluids may consider repeating it if no improvement, would also consider giving Lasix if no improvement.  --Agree with strict calorie count per primary team, and try to encourage oral intake as tolerated.  --Strict I&Os  --Adjust all meds to kidney function.  --Avoid Nephrotoxins.      Recommendations were communicated to primary team via Vocera and Note.    Seen and discussed with Dr. Alston.    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 Hospital stay complicated with MADHU and increased LE swelling and anasarca. Pt with a history of psychiatric illness as well mainly schizophrenia, psychiatry on board, decided on adding Zyprexa to also aid in increasing appetite if family agree. Multiple goals of care discussions were held with the daughter and primary team during this stay, last was done today 03/12/2025; especially since the pt is refusing eating/drinking and taking meds, Daughter agreed on a meeting on 03/13/2025 with primary and palliative to discuss goals of care and code status.    Nephrology Consulted to manage MADHU; pt was noted to have elevated Cr starting 03/07/2025 1.25 from 0.9 on 03/06/2025; renal imaging ruled out hydronephrosis and obstruction. Cr kept trending up reaching 1.51 today 03/12/2025. Pt was also noted to be oligouric; UOP reaching 125 ml the past 24 hours. Pt had received multiple boluses of  ml-1L the past few days and was on maintenance IV fluids as well however with no improvement in Cr levels, and she became more edematous so IV fluids were stopped.    Of note; pt was admitted back in 02/05/2025-02/25/2025 for deconditioning, MADHU on CKD, chronic bilateral LE edema, hyponatremia, severe malnutrition, and failure to thrive.    PAST MEDICAL HISTORY:  Reviewed with patient on 03/12/2025     Past  Medical History:   Diagnosis Date    Atrial fibrillation (H)     Depression     Hypertension     Malignant neoplasm of endocervix (H)     Tx with radiation    Near syncope 11/7/2024    Orthopnea 9/21/2024    Other chronic pain     Low back    Schizophrenia (H)     Urinary incontinence        Past Surgical History:   Procedure Laterality Date    ABDOMINOPLASTY      BIOPSY CERVICAL, LOCAL EXCISION, SINGLE/MULTIPLE  10/26/2011    Procedure:BIOPSY CERVICAL, LOCAL EXCISION, SINGLE/MULTIPLE; EUA, cervical biopsies; Surgeon:BETTY TINEO; Location:[facility] OR    BIOPSY VAGINAL N/A 06/08/2021    Procedure: BIOPSY, VAGINA;  Surgeon: Dany Perez MD;  Location: [facility]    CYSTOSCOPY N/A 05/17/2024    Procedure: Cystoscopy;  Surgeon: Dany Perez MD;  Location: [facility]    CYSTOSTOMY, INSERT TUBE SUPRAPUBIC, COMBINED  07/12/2024    Procedure: Insertion of supra-pubic catheter;  Surgeon: Dany Perez MD;  Location: [facility]    DILATION AND CURETTAGE, WITH ULTRASOUND GUIDANCE N/A 05/17/2024    Procedure: Dilation and curettage of the uterus with drainage of uterine fluid under ultrasound guidance, lysis of vaginal adhesions;  Surgeon: Dany Perez MD;  Location: [facility]    EXAM UNDER ANESTHESIA PELVIC N/A 03/12/2020    Procedure: Exam under anesthsia, vaginal biopsies, possible CO2 laser of the vagina;  Surgeon: Lilliam Roy MD;  [facility]: [facility]    GI SURGERY  2004    gastric bypass    HYSTERECTOMY TOTAL ABDOMINAL, BILATERAL SALPINGO-OOPHORECTOMY, COMBINED Bilateral 07/12/2024    Procedure: Diagnostic laparoscopy converted to exploratory laparotomy, total abdominal hysterectomy, bilateral salpingo-oophorectomy, lysis of adhesions >60 min;  Surgeon: Dany Perez MD;  Location: [facility]    INSERT PORT VASCULAR ACCESS N/A 08/26/2024    Procedure: Insert port vascular access;  Surgeon: Avery Gregory MD;  Location: [facility]    IR CHEST PORT PLACEMENT > 5 YRS OF AGE  08/26/2024    IR FOLLOW UP VISIT OUTPATIENT   1/7/2025    IR NEPHROSTOMY TUBE PLACEMENT LEFT  11/29/2024    LASER CO2 VAGINA N/A 03/02/2015    Procedure: LASER CO2 VAGINA;  Surgeon: Mariela Abdalla MD;  Location: [facility]    LASER CO2 VAGINA N/A 05/24/2019    Procedure: Exam under anesthesia, vaginal biopsies, CO2 laser of the vagina;  Surgeon: Lilliam Roy MD;  Location: [facility]    LASER CO2 VAGINA N/A 06/08/2021    Procedure: Exam under anesthesia, laser ablation of the upper vagina;  Surgeon: Dany Perez MD;  Location: [facility]    PICC DOUBLE LUMEN PLACEMENT Left 11/28/2024    left basilic 5 fr dl power picc 44 cm    REPAIR BLADDER N/A 07/12/2024    Procedure: Repair bladder;  Surgeon: Dany Perez MD;  Location: [facility]        MEDICATIONS:  PTA Meds  Prior to Admission medications    Medication Sig Last Dose Taking? Auth Provider Long Term End Date   acetaminophen (TYLENOL) 325 MG tablet Take 3 tablets (975 mg) by mouth every 8 hours. 3/4/2025 Yes Maci Chandler MD No    albuterol (PROAIR HFA/PROVENTIL HFA/VENTOLIN HFA) 108 (90 Base) MCG/ACT inhaler Inhale 1-2 puffs into the lungs every 4 hours as needed for shortness of breath  Yes Reported, Patient     calcium Citrate-vitamin D 500-400 MG-UNIT CHEW Take 1 tablet by mouth daily Unknown Yes Reported, Patient     cyanocobalamin (CYANOCOBALAMIN) 1000 MCG/ML injection Inject 1 mL (1,000 mcg) into a muscle every month. Unknown Yes Reported, Patient     diclofenac (VOLTAREN) 1 % topical gel Apply 4 g topically 4 times daily as needed for moderate pain. 3/3/2025 Yes Mar Chua MD     ferrous sulfate (CASSANDRA-IN-SOL) 75 (15 FE) MG/ML oral drops Take 15 mg by mouth daily Unknown Yes Reported, Patient     hydrocortisone 2.5 % cream Apply topically as needed  Yes Reported, Patient     lidocaine (XYLOCAINE) 5 % external ointment Apply 1 Application topically as needed  Yes Reported, Patient     magic mouthwash suspension, diphenhydrAMINE, lidocaine, aluminum-magnesium & simethicone, (FIRST-MOUTHWASH  "BLM) compounding kit Swish and spit 10 mLs in mouth 3 times daily (before meals). 3/4/2025 Yes Maci Chandler MD     melatonin 1 MG TABS tablet Take 1 tablet (1 mg) by mouth nightly as needed for sleep. 3/3/2025 Bedtime Yes Maci Chandler MD     morphine 0.5 % in solosite gel Apply 8-16 clicks (4-8 g) topically daily as needed for pain (with dressing changes).  Yes Kamilah De La Torre PA-C     naloxone (NARCAN) 4 MG/0.1ML nasal spray Spray 1 spray (4 mg) into one nostril alternating nostrils as needed for opioid reversal every 2-3 minutes until assistance arrives  Yes Gaurang Guajardo MD Yes    ondansetron (ZOFRAN) 8 MG tablet Take 1 tablet (8 mg) by mouth every 8 hours as needed for nausea  Yes Kelsey Mccracken APRN CNP     oxyCODONE IR (ROXICODONE) 20 MG TABS immediate release tablet Take 20 mg by mouth every 4 hours as needed for severe pain (Chronic Pain). 3/4/2025 Yes Maci Chandler MD No    phenol (CHLORASEPTIC) 1.4 % spray Take 1-2 sprays (1-2 mLs) by mouth every hour as needed for sore throat.  Yes Dany Perez MD     polyethylene glycol (MIRALAX) 17 GM/Dose powder Take 17 g by mouth daily as needed for constipation 3/3/2025 Yes Dany Perez MD     prochlorperazine (COMPAZINE) 5 MG tablet Take 1-2 tablets (5-10 mg) by mouth every 6 hours as needed for nausea or vomiting  Yes Kelsey Mccracken APRN CNP     Skin Protectants, Misc. (INTERDRY 10\"X36\") SHEE Externally apply 10 each topically every 24 hours. Apply to skin folds on abdomen  Yes Dany Perez MD     triamcinolone (KENALOG) 0.1 % external ointment Apply topically 3 times daily as needed for irritation.  Yes Dany Perez MD        Current Meds  Current Facility-Administered Medications   Medication Dose Route Frequency Provider Last Rate Last Admin    acetaminophen (TYLENOL) tablet 975 mg  975 mg Oral Q8H Salvador Dickerson MD   975 mg at 03/12/25 1447    artificial saliva (BIOTENE MT) solution 1 spray  1 spray Mouth/Throat 4x " Daily Salvador Dickerson MD   1 spray at 03/10/25 1945    enoxaparin ANTICOAGULANT (LOVENOX) injection 40 mg  40 mg Subcutaneous Q24H Salvador Dickerson MD   40 mg at 03/12/25 0842    Lidocaine (LIDOCARE) 4 % Patch 1 patch  1 patch Transdermal Q24H Deepali Negrete MD   1 patch at 03/11/25 1221    magic mouthwash suspension (diphenhydramine, lidocaine, aluminum-magnesium & simethicone)  10 mL Swish & Spit TID AC Salvador Dickerson MD   10 mL at 03/08/25 0948    polyethylene glycol (MIRALAX) Packet 17 g  17 g Oral Daily Piter Webber MD   17 g at 03/09/25 1656    senna-docusate (SENOKOT-S/PERICOLACE) 8.6-50 MG per tablet 1 tablet  1 tablet Oral At Bedtime Piter Webber MD   1 tablet at 03/10/25 2150    sodium chloride (PF) 0.9% PF flush 3 mL  3 mL Intracatheter Q8H Salvador Dickerson MD   10 mL at 03/12/25 1510     Infusion Meds  Current Facility-Administered Medications   Medication Dose Route Frequency Provider Last Rate Last Admin       ALLERGIES:    Allergies   Allergen Reactions    Ibuprofen Nausea and Vomiting    Shrimp     Sulfa Antibiotics Rash     Patient started on bactrim 7/24/24 tolerating medication without rash on 7/25        REVIEW OF SYSTEMS:  A comprehensive of systems was negative except as noted above.    SOCIAL HISTORY:   Social History     Socioeconomic History    Marital status: Single     Spouse name: Not on file    Number of children: Not on file    Years of education: Not on file    Highest education level: Not on file   Occupational History    Not on file   Tobacco Use    Smoking status: Never    Smokeless tobacco: Never   Substance and Sexual Activity    Alcohol use: Not Currently    Drug use: No    Sexual activity: Not on file   Other Topics Concern    Parent/sibling w/ CABG, MI or angioplasty before 65F 55M? Not Asked   Social History Narrative    Not on file     Social Drivers of Health     Financial Resource Strain: Unknown (3/12/2025)    Financial Resource Strain     Within  the past 12 months, have you or your family members you live with been unable to get utilities (heat, electricity) when it was really needed?: Patient unable to answer   Food Insecurity: Unknown (3/12/2025)    Food Insecurity     Within the past 12 months, did you worry that your food would run out before you got money to buy more?: Patient unable to answer     Within the past 12 months, did the food you bought just not last and you didn t have money to get more?: Patient unable to answer   Transportation Needs: Unknown (3/12/2025)    Transportation Needs     Within the past 12 months, has lack of transportation kept you from medical appointments, getting your medicines, non-medical meetings or appointments, work, or from getting things that you need?: Patient unable to answer   Physical Activity: Not on file   Stress: Not on file   Social Connections: Not on file   Interpersonal Safety: Unknown (3/12/2025)    Interpersonal Safety     Do you feel physically and emotionally safe where you currently live?: Patient unable to answer     Within the past 12 months, have you been hit, slapped, kicked or otherwise physically hurt by someone?: Patient unable to answer     Within the past 12 months, have you been humiliated or emotionally abused in other ways by your partner or ex-partner?: Patient unable to answer   Housing Stability: Unknown (3/12/2025)    Housing Stability     Do you have housing? : Patient unable to answer     Are you worried about losing your housing?: Patient unable to answer       FAMILY MEDICAL HISTORY:   Family History   Problem Relation Age of Onset    Heart Disease Father     Heart Failure Father     No Known Problems Brother     No Known Problems Brother     No Known Problems Brother     Pancreatitis Brother     Hypertension Sister     Peripheral Vascular Disease Sister     No Known Problems Sister     No Known Problems Son     No Known Problems Daughter        PHYSICAL EXAM:   Temp  Av  F  (36.1  C)  Min: 93  F (33.9  C)  Max: 98.7  F (37.1  C)      Pulse  Av.7  Min: 52  Max: 111 Resp  Av.4  Min: 13  Max: 20  SpO2  Av.9 %  Min: 91 %  Max: 100 %       BP (!) 151/95   Pulse 105   Temp 97.7  F (36.5  C) (Temporal)   Resp 20   Wt 61.2 kg (134 lb 14.7 oz)   SpO2 100%   BMI 23.90 kg/m     Date 25 0700 - 25 0659   Shift 1045-6152 5062-0897 3406-4510 24 Hour Total   INTAKE   P.O. 470   470   Colloid 500   500   Shift Total(mL/kg) 970(15.85)   970(15.85)   OUTPUT   Shift Total(mL/kg)       Weight (kg) 61.2 61.2 61.2 61.2      Admit Weight: 61.2 kg (134 lb 14.7 oz)     General Appearance: Ill-looking pt, cachectic.  Chest: Right chest port catheter in place.  Respiratory: On room air. Lungs are clear to auscultation bilaterally.    Cardiovascular: Regular rate and rhythm.    GI: Normal bowel sounds, Soft, nontender, nondistended.  Suprapubic catheter in place draining scant amount of urine.  Extremities: Bilateral lower extremity edema.   Neurology:  Moving all extremities appropriately    LABS:   CMP  Recent Labs   Lab 25  0534 25  0309 03/10/25  0529 25  0734    139 138 139   POTASSIUM 3.6 3.6 3.6 3.6   CHLORIDE 110* 109* 109* 108*   CO2 22 22 22 19*   ANIONGAP 9 8 7 12   GLC 86 93 89 80   BUN 31.7* 28.1* 27.2* 26.9*   CR 1.51* 1.29* 1.27* 1.23*   GFRESTIMATED 37* 44* 45* 47*   SAMUEL 8.2* 8.4* 8.3* 8.4*   MAG 2.2 2.1 2.1 2.2   PHOS 3.5 3.2 3.0 2.9   PROTTOTAL 4.6* 4.6* 4.4* 4.5*   ALBUMIN 2.3* 2.4* 2.3* 2.4*   BILITOTAL 0.5 0.5 0.4 0.4   ALKPHOS 167* 174* 153* 139   AST 24 28 34 46*   ALT 26 29 27 29     CBC  Recent Labs   Lab 25  0507 25  0309 03/10/25  0529 25  0734   HGB 8.2* 7.9* 7.3* 8.2*   WBC 8.1 7.5 6.9 8.8   RBC 2.63* 2.58* 2.43* 2.67*   HCT 24.8* 23.5* 22.1* 24.7*   MCV 94 91 91 93   MCH 31.2 30.6 30.0 30.7   MCHC 33.1 33.6 33.0 33.2   RDW 25.4* 25.3* 24.6* 24.5*   * 135* 118* 131*     INRNo lab results found in last 7  days.  ABGNo lab results found in last 7 days.   URINE STUDIES  Recent Labs   Lab Test 03/11/25  1445 03/07/25  0832 03/04/25  2030 02/23/25  1728 07/21/24  1705 04/11/24  1152 05/23/23  0820 02/15/23  0927   COLOR Orange* Orange* Yellow Dark Brown*   < > Yellow   < > Yellow   APPEARANCE Cloudy* Cloudy* Slightly Cloudy* Cloudy*   < > Clear   < > Clear   URINEGLC Negative Negative Negative Negative   < > Negative   < > Negative   URINEBILI Negative Negative Negative Negative   < > Negative   < > Negative   URINEKETONE Negative Trace* Negative Negative   < > Negative   < > Negative   SG 1.020 1.026 1.014 1.021   < > 1.010   < > 1.015   UBLD Large* Large* Large* Large*   < > Moderate*   < > Large*   URINEPH 5.5 5.5 5.5 5.5   < > 7.0   < > 7.0   PROTEIN 200* 200* 50* 100*   < > Trace*   < > Negative   UROBILINOGEN  --   --   --   --   --  1.0  --  1.0   NITRITE Negative Negative Negative Negative   < > Positive*   < > Positive*   LEUKEST Large* Large* Large* Large*   < > Large*   < > Small*   RBCU >182* 61* >182* >182*   < > 10-25*   < > 25-50*   WBCU >182* >182* >182* >182*   < > >100*   < > 10-25*    < > = values in this interval not displayed.     No lab results found.  PTH  No lab results found.  IRON STUDIES  Recent Labs   Lab Test 02/15/25  0836 09/24/24  1330   IRON 17* 22*   FEB 40* 100*   IRONSAT 43 22   CASSANDRA 270 450*       IMAGING:  The radiologist's report which is filed    Navneet Sims MD

## 2025-03-12 NOTE — PROGRESS NOTES
"[facility]    Medicine Progress Note - Hospitalist Service, GOLD TEAM 8    Date of Admission:  3/4/2025    Assessment & Plan   \"Aranza\" Alis Hartman is a 69 year old woman admitted on 2/5/2025. 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  Hospital course complicated by MADHU and hypervolemia, acute on chronic pain, difficult neurocognitive status, decreased oral intake and ongoing discussion of goals of care.        Today:   - Continue ongoing goals of care discussion with palliative, appreciate assistance  - Patient's daughter Joy was updated about her care.  Plan for care conference tomorrow afternoon at 12 PM.  Palliative care and RN CC has been made aware.    - Nephrology consulted, recommending albumin      MADHU, oliguric  Hypervolemia  Creatinine at baseline 0.8-0.9 and admission. Trended up from 3/06, then peaked at 1.29 on 2/08, trended down and starting to trend up again 3/10.  Elevated urine protein creatinine ratio and urine albumin.UA 3/11: 200 albumin, large blood, large leukocyte Estrace, greater than 182 WBC, moderate bacteria, many yeast, hyaline casts  Etiology thought to be likely prerenal v/s ATN 2/2 decreased oral intake/malnutrition  Renal US normal  Was on IV fluids, stopped 3/11  - Nephrology consulted, recommending given 100 g of 25% albumin  - Strict I's and O's, daily weights  - Monitor on BMP  - Follow-up 3/11 urine culture    Delirium v/s Metabolic Encephalopathy, improved  Schizophrenia   RAFAEL   Unspecified neurocognitive Disorder: MOCA " 25/30 9/22/22   On admission patient's family was concerned about her confusion. Patient has paranoia with suspected hallucinations. Acute worsening was likely related to possible UTI v/s dehydration lack of PO. B12 FOLATE, ammonia all normal. Initially considered CT head but patient is unable to lie flat. And also has come back to baseline after fluids, pain control.   - Psychiatry consulted, saw patient 3/11, patient denied depression, hallucinations and psych could not elicit any paranoia symptoms.    -Recommend Zyprexa 2.5 twice daily.  Daughter would like to think more about it before starting.  - Delirium precautions    Acute on Chronic Pain  Patient reporting thigh, low back pain related to her coccyx wound mentioned below  - Oxycodone 10 mg q3h prn oral  - IV dilaudid 0.5 mg Q2H prn for breakthrough pain   - Lidocaine patch     Goals of care   Recurrent hospitalizations with progressive step-wise decline  Severe Protein-Calorie Malnutrition   Advanced care planning Discussion  Sacral pressure ulcer  - 03/12: Extensive discussion with the patient's daughter regarding overall clinical condition and goals of care.  I did inform the daughter that her mother has been refusing all medications, food, cares, while inpatient.  Daughter expressed that she was worried that maybe her mom was paranoid.  I did inform her that she was seen by psychiatry who did not elicit any paranoia symptoms, however due to recommend treating schizophrenia with Zyprexa if family desires.  She had not made a decision about it.  She had questions about feeding tube, I did inform her that her mother is unable to lay down and is usually slumped over in a feeding tube may not be feasible, and having extra tube can make her delirium/mental health worse.  She expressed understanding, and is willing to come in 03/13 at 12 PM for [place] [employer] discussion of goals of care, CODE STATUS with the primary team and palliative care.  - Nutrition consulted,  appreciate recommendations.  Continue calorie Counts ordered for 3/11-3/15  - WO consulted    --------------------------------------------------------------   Afib   Rate controlled   Prior admission had risk/benefit discussion and discontinued AC due to vaginal bleeding.     Serous endometrial carcinoma  Follows w/ heme/onc through [facility]. S/p SNEHA, BSO 07/2024 s/p cycle 3 carbo/taxel 10/15/2024, cycle 4 delayed in s/o recent admission, family ultimately decided to forgo any further treatment.   During care conference, it was expressed that, her cancer is likely to recur due to it being an aggressive type but when it will recur is unknown. However, should it recur, GynOnc team expressed that, because of her functional status (significant weakness) and malnutrition, treatment would not be recommended since that would worsen her health overall and benefits would not outweigh the risks.    Pyuria   Hx of ESBL urosepsis and bacteremia  Bladder dysfunction s/p cystostomy and suprapubic catheter  Recently admitted 11/26 - 12/8/2024 for ESBL urosepsis and bacteremia requiring ICU w/ L nephrostomy tube (removed 1/7) treated w/ ertapenem, returned 1/25-1/27 for concern for UTI but no clear infection at that time.  ID was consulted that admission and recommended avoiding frequent UA and Ucx checks in future unless patient w/ symptoms of UTI. This admission increased confusion, poor appetite and increased generalized weakness, foul smelling urine. CT abdomen unremarkable. Not septic on presentation. U/A showed growing [id number] mixed elisa. U/A collected from exchanged kent in the ED 3/4. S/p unasyn (3/4-3/5). S/p zosyn (3/5-3/6).   -Continue suprapubic catheter               Diet: Combination Diet Regular Diet Adult  Snacks/Supplements Adult: Ensure Enlive; With Meals  Snacks/Supplements Adult: Boost Soothe; Between Meals  Calorie Counts    DVT Prophylaxis: Enoxaparin (Lovenox) SQ  Kent Catheter: Not present  Lines:  PRESENT      Port a Cath 02/05/25 Single Lumen Right Chest wall-Site Assessment: WDL      Cardiac Monitoring: None  Code Status: Full Code      Clinically Significant Risk Factors          # Hyperchloremia: Highest Cl = 109 mmol/L in last 2 days, will monitor as appropriate          # Hypoalbuminemia: Lowest albumin = 2.3 g/dL at 3/10/2025  5:29 AM, will monitor as appropriate     # Hypertension: Noted on problem list             # Severe Malnutrition: based on nutrition assessment and treatment provided per dietitian's recommendations.    # Financial/Environmental Concerns: none         Social Drivers of Health    Food Insecurity: Unknown (3/12/2025)    Food Insecurity     Within the past 12 months, did you worry that your food would run out before you got money to buy more?: Patient unable to answer     Within the past 12 months, did the food you bought just not last and you didn t have money to get more?: Patient unable to answer   Housing Stability: Unknown (3/12/2025)    Housing Stability     Do you have housing? : Patient unable to answer     Are you worried about losing your housing?: Patient unable to answer   Financial Resource Strain: Unknown (3/12/2025)    Financial Resource Strain     Within the past 12 months, have you or your family members you live with been unable to get utilities (heat, electricity) when it was really needed?: Patient unable to answer   Transportation Needs: Unknown (3/12/2025)    Transportation Needs     Within the past 12 months, has lack of transportation kept you from medical appointments, getting your medicines, non-medical meetings or appointments, work, or from getting things that you need?: Patient unable to answer   Interpersonal Safety: Unknown (3/12/2025)    Interpersonal Safety     Do you feel physically and emotionally safe where you currently live?: Patient unable to answer     Within the past 12 months, have you been hit, slapped, kicked or otherwise physically hurt  by someone?: Patient unable to answer     Within the past 12 months, have you been humiliated or emotionally abused in other ways by your partner or ex-partner?: Patient unable to answer          Disposition Plan     Medically Ready for Discharge: Unclear, pending discussion of goals of care tomorrow.             Deepali Negrete MD  Hospitalist Service, GOLD TEAM 8  [facility]  Securely message with [employer] (more info)  Text page via [facility] Paging/Directory   See signed in provider for up to date coverage information  ______________________________________________________________________    Interval History   Notes reviewed.  Has been refusing cares, meds, food, port needle change.  Minimal urine output.  Difficult to get information from patient.  She does report that she is hungry, but however she is unable to elaborate why she is not eating or refusing cares.    Physical Exam   Vital Signs: Temp: 97.7  F (36.5  C) Temp src: Temporal BP: 99/81 Pulse: 105   Resp: 20 SpO2: 100 % O2 Device: None (Room air)    Weight: 134 lbs 14.74 oz    General Appearance:  Awake, Alert, Oriented to self and location, cachectic  Chest: Right chest port catheter in place  Respiratory: Breathing comfortably on room air. Lungs are clear to auscultation bilaterally.    Cardiovascular: Regular rate and rhythm, extremities perfused.    GI: Normal bowel sounds, Soft, nontender, nondistended.  Suprapubic catheter in place draining scant amount of urine  Extremities: Bilateral lower extremity edema   Neurology:  moving all extremities appropriately     Medical Decision Making             Data

## 2025-03-12 NOTE — CONSULTS
BANDAR acknowledging Care Management consults. Please see BANDAR's other note from today for more information and follow up on consults.     FIFI Shah  [facility]   [facility]   Available via [employer]  Covering 5A BANDAR, ph: [phone]

## 2025-03-12 NOTE — PROGRESS NOTES
"  PALLIATIVE CARE PROGRESS NOTE  [facility]     Patient Name: Alis Hartman  Date of Admission: 3/4/2025   Today the patient was seen for: goals of care     Recommendations & Counseling       GOALS OF CARE:   Full code- confirmed with daughter 3/11. At this time, goals remain life-prolonging without limits. We have discussed hospice with Joy (Aranza's daughter) though she is not ready to make any decisions.   Psychiatry has recommending zyprexa 2.5mg BID, if this is okay with Joy.   Primary team had a phone conversation with Joy today regarding medical updates and need to make decisions.   We need to have an in-person care conference this week to discuss goals of care with Joy, primary team, palliative care, and unit SW. Primary team discussed this with Joy today and we will plan to meet in-person tomorrow at 1200.     ADVANCE CARE PLANNING:  Previously completed HCD naming Aranza (daughter) as HCA, appears this was not dated and deemed \"invalid.\" Aranza confirmed during last admission that Joy is her surrogate decision maker per notes in chart.   There is a POLST form on file, this was reviewed and current.  Code status: Full Code     MEDICAL MANAGEMENT:   #Pain, chronic, low back and abdominal after surgery in July. On chronic prn opioids in varying doses x >10 years. Was down to 5mg oxycodone q 6 hours, but in recent month dose increased up to 20mg q 6 hours prn during last hospitalization.    #Pain, L thigh  #Pain, sacral/coccyx wound and B arm and leg wounds  #Sedation  Remains hunched over in bed today, asking for help sitting up. She endorses pain but is unable to elaborate further (where the pain is, what helps/hurts, etc). She still has moments where she appears to be in pain alternating with moments where she appears comfortable. She is getting very few doses of her oxycodone given that she is not taking oral meds and is not having " "any withdrawal symptoms.   - Acetaminophen 975mg q8hr  - Oxycodone to 10mg q3hr prn  - Hydromorphone to 0.5mg q2 hr prn for pain not controlled with PO meds.   - Encourage bowel regimen   - anticipate she would benefit from longer acting pain management and has insurance coverage for butrans patch (would start 10 mcg/hr), but daughter prefers to discuss any medication changes before starting. Will plan to discuss with daughter tomorrow.      PSYCHOSOCIAL/SPIRITUAL:  Family: Daughter (Joy) and grandchildren, son (Hemanth), niece (Armida)  Joy mentions things being \"God's plan\", though specific Buddhism needs/affiliations not addressed     Palliative Care will continue to follow. Thank you for the consult and allowing us to aid in the care of Alis Hartman.    These recommendations have been discussed with primary team, psychiatry.    Veronica Kahn MD  [facility], Palliative Care  Securely message with the Vocera Web Console (learn more here) or  Text page via [email or web address]      Patient seen and evaluated with Dr. Kahn.   Agree with assessment and recommendations.  Ritika Franco MD / Palliative Medicine   Securely message with the Vocera Web Console (learn more here)   Text page via [employer]        Assessment          Alis Hartman is a 69 year old female with a past medical history of cervical cancer s/p radiation, seriuos adenocarcinoma s/p SNEHA/BSO and cystotomy w/ suprapubic catheter placement 07/2024 and 3 cycles of carbo/taxol (10/2024), hx ESBL urosepsis and bacteremia, RNY gastric bypass, afib on apixaban, HTN, CKD stage 3, and multiple recent admissions (most recently 2/5-2/25 with MADHU, deconditioning, and malnutrition). She is now admitted with concern for UTI and failure to thrive/general weakness.      The patient's daughter Joy met with Dr. Perez of gyn-onc and goals of care were discussed. She discussed hospice and palliative care has been consulted to " continue goals of care discussion, including discussion of hospice and pain management.      We had a care conference on 3/7 and again on 3/10. Spoke with Joy again on 3/11.  Daughter (Joy) expresses that she is very overwhelmed by her mother's condition and the possibility of her dying. Goals are currently restorative though we are continuing to discuss options with Joy, including hospice.      Today, the patient was seen for:  Goal of care  Family support  Symptom management  Palliative encounter      Interval History:     Multidisciplinary collaboration:  Refusing to take PO medications, refusing also other PO intake such as food/fluids. Weeping edema in LE. Planning for care conference in person tomorrow at 1200.     Notable medications:  Acetaminophen 975 mg q8h (refusing most doses)  Hydromorphone IV PRN- 4.5 mg over last 24 hours  Lidocaine patch (refused today)  Magic mouthwash scheduled- not using  Miralax daily- not using  Senna at bedtime - did not take yesterday  Oxycodone PRN - 10mg (1 dose) over last 24 hours      Patient/family narrative  Saw today, hunched over in bed. Patient repeatedly asks for help sitting up. When I help her to sit up, she is unable to maintain the position. She cannot lift her head up. She does not answer questions (Where are we? What hurts? Did you eat anything today?).     Review of Systems:     Besides above, ROS was reviewed and is unremarkable        Physical Exam:   Temp:  [97  F (36.1  C)-97.7  F (36.5  C)] 97.7  F (36.5  C)  Pulse:  [] 105  Resp:  [14-20] 20  BP: ()/() 151/95  SpO2:  [100 %] 100 %  134 lbs 14.74 oz    Gen: Sitting in bed hunched forward, chronically ill appearing, repeats same thing over and over again  Neuro: Awake, does not answer orientation questions. Moving all extremities.    Pulm: nonlabored breathing, comfortable on room air, no cough  CV: RRR by peripheral pulse, noncyanotic   ABD: Unable to examine  MSK:  BLE  edema  Skin: Warm, dry, no rashes or abrasions on exposed skin. Chronic wounds not exposed        Data Reviewed:     Results for orders placed or performed during the hospital encounter of 03/04/25 (from the past 24 hours)   UA with Microscopic   Result Value Ref Range    Color Urine Orange (A) Colorless, Straw, Light Yellow, Yellow    Appearance Urine Cloudy (A) Clear    Glucose Urine Negative Negative mg/dL    Bilirubin Urine Negative Negative    Ketones Urine Negative Negative mg/dL    Specific Gravity Urine 1.020 1.003 - 1.035    Blood Urine Large (A) Negative    pH Urine 5.5 5.0 - 7.0    Protein Albumin Urine 200 (A) Negative mg/dL    Urobilinogen Urine Normal Normal, 2.0 mg/dL    Nitrite Urine Negative Negative    Leukocyte Esterase Urine Large (A) Negative    Bacteria Urine Moderate (A) None Seen /HPF    WBC Clumps Urine Present (A) None Seen /HPF    Budding Yeast Urine Many (A) None Seen /HPF    Mucus Urine Present (A) None Seen /LPF    RBC Urine >182 (H) <=2 /HPF    WBC Urine >182 (H) <=5 /HPF    Squamous Epithelials Urine 6 (H) <=1 /HPF    Transitional Epithelials Urine 3 (H) <=1 /HPF    Hyaline Casts Urine 28 (H) <=2 /LPF   Comprehensive metabolic panel   Result Value Ref Range    Sodium 141 135 - 145 mmol/L    Potassium 3.6 3.4 - 5.3 mmol/L    Carbon Dioxide (CO2) 22 22 - 29 mmol/L    Anion Gap 9 7 - 15 mmol/L    Urea Nitrogen 31.7 (H) 8.0 - 23.0 mg/dL    Creatinine 1.51 (H) 0.51 - 0.95 mg/dL    GFR Estimate 37 (L) >60 mL/min/1.73m2    Calcium 8.2 (L) 8.8 - 10.4 mg/dL    Chloride 110 (H) 98 - 107 mmol/L    Glucose 86 70 - 99 mg/dL    Alkaline Phosphatase 167 (H) 40 - 150 U/L    AST 24 0 - 45 U/L    ALT 26 0 - 50 U/L    Protein Total 4.6 (L) 6.4 - 8.3 g/dL    Albumin 2.3 (L) 3.5 - 5.2 g/dL    Bilirubin Total 0.5 <=1.2 mg/dL   Magnesium   Result Value Ref Range    Magnesium 2.2 1.7 - 2.3 mg/dL   Phosphorus   Result Value Ref Range    Phosphorus 3.5 2.5 - 4.5 mg/dL     Recent Labs   Lab 03/12/25  0557  03/11/25  0507 03/11/25  0309 03/10/25  0529   WBC  --  8.1 7.5 6.9   HGB  --  8.2* 7.9* 7.3*   MCV  --  94 91 91   PLT  --  148* 135* 118*     --  139 138   POTASSIUM 3.6  --  3.6 3.6   CHLORIDE 110*  --  109* 109*   CO2 22  --  22 22   BUN 31.7*  --  28.1* 27.2*   CR 1.51*  --  1.29* 1.27*   ANIONGAP 9  --  8 7   SAMUEL 8.2*  --  8.4* 8.3*   GLC 86  --  93 89   ALBUMIN 2.3*  --  2.4* 2.3*   PROTTOTAL 4.6*  --  4.6* 4.4*   BILITOTAL 0.5  --  0.5 0.4   ALKPHOS 167*  --  174* 153*   ALT 26  --  29 27   AST 24  --  28 34       No results found for this or any previous visit (from the past 24 hours).      Medical Decision Making       MANAGEMENT DISCUSSED with the following over the past 24 hours: medicine team   NOTE(S)/MEDICAL RECORDS REVIEWED over the past 24 hours: medicine and nursing notes, MAR review  Tests REVIEWED in the past 24 hours:  - CMP  - CBC

## 2025-03-12 NOTE — PROVIDER NOTIFICATION
Provider notification:    Notified Hospitalist Brittanie Sahni: Update for you, pt is refusing most cares and refuses food when offered. Vitals are normal, but I'm concerned with how little she is eating. Maru CRAMER [phone]

## 2025-03-12 NOTE — PLAN OF CARE
Goal Outcome Evaluation:              Plan of Care Reviewed With: patient     Overall Patient Progress: declining    [id number] Pt VSS on RA. AxO to self only. Razia hugger on, Temp remains normal. Pt refuses most cares today; pt refuses oral medications. Pt's appetite remains very poor, with patient refusing food. Pt often asks to be repositioned, but expresses severe pain when being turned. Pt remains hunched over in bed. Shahid in, small amounts of urine noted. Pt incontinent of stool, no BM today. Pt's port needle needed changing today, pt refused this care, and began yelling when attempted to reposition. Pt reports extreme pain, IV dilaudid given every two hours. Provider notified of pt's refusal of cares and refusal to eat. RD consulted. Pt not OOB this shift. Continue with plan of care.

## 2025-03-12 NOTE — PLAN OF CARE
"Goal Outcome Evaluation:    Plan of Care Reviewed With: patient  Overall Patient Progress: no change    BP (!) 151/95   Pulse 105   Temp 97.7  F (36.5  C) (Temporal)   Resp 20   Wt 61.2 kg (134 lb 14.7 oz)   SpO2 100%   BMI 23.90 kg/m      Oriented to self and place. Pt often asked for help and crying but when asked what she needed \"what do you mean?\"., then would say \" I need it back in my heart\".  Says has pain in back, refused tylenol and lidocaine patch-  LS dim at the bases.  Incontinent of small stools. Suprapubic cath with small output-cloudy urine(nephro aware). CHG/partial bath done.  Pt able to drink most of boost and protein drink at bedside and few spoonfuls of applesauce.  Pt tend to position self on her right side this shift.  Coccyx wound re-dressed.  Mepilexes in thighs and heels are intact, BLE swelling are weeping. MD notified for lymphedema therapy.  Port with +blood return, unable to change needle.   Albumin given.  Will continue to provide emotional support and continue with POC.    "

## 2025-03-12 NOTE — PROGRESS NOTES
Prior Authorization Approval    Medication: BUPRENORPHINE 10 MCG/HR TD PTWK  PA Initiated: 3/12/2025  PA Type: Clinical    Insurance: [employer] - Phone [phone] Fax [phone]  [facility] Key / Reference #: [id number] / [id number]   Authorization Effective Dates: 3/12/2025 - 3/12/2025    Expected CoPay: $ 0.00  CoPay Card Eligible: No    Filling Pharmacy: [facility] - [address]  Comments:  Proactive PA. Please send a script to the discharge pharmacy.    Padmini Perez  [facility] Liaison, M-[facility]IMA  Ph: [phone]  Fax: [phone]  Available on Teams and [employer]

## 2025-03-13 ENCOUNTER — APPOINTMENT (OUTPATIENT)
Dept: OCCUPATIONAL THERAPY | Facility: CLINIC | Age: 70
End: 2025-03-13
Attending: STUDENT IN AN ORGANIZED HEALTH CARE EDUCATION/TRAINING PROGRAM
Payer: COMMERCIAL

## 2025-03-13 LAB
ABO + RH BLD: NORMAL
ALBUMIN SERPL BCG-MCNC: 3.2 G/DL (ref 3.5–5.2)
ALP SERPL-CCNC: 114 U/L (ref 40–150)
ALT SERPL W P-5'-P-CCNC: 16 U/L (ref 0–50)
ANION GAP SERPL CALCULATED.3IONS-SCNC: 9 MMOL/L (ref 7–15)
AST SERPL W P-5'-P-CCNC: 19 U/L (ref 0–45)
ATRIAL RATE - MUSE: 71 BPM
BACTERIA BLD CULT: NORMAL
BASOPHILS # BLD AUTO: 0 10E3/UL (ref 0–0.2)
BASOPHILS NFR BLD AUTO: 0 %
BILIRUB SERPL-MCNC: 0.5 MG/DL
BLD GP AB SCN SERPL QL: NEGATIVE
BLD PROD TYP BPU: NORMAL
BLOOD COMPONENT TYPE: NORMAL
BUN SERPL-MCNC: 32.5 MG/DL (ref 8–23)
BURR CELLS BLD QL SMEAR: SLIGHT
CALCIUM SERPL-MCNC: 8.7 MG/DL (ref 8.8–10.4)
CHLORIDE SERPL-SCNC: 110 MMOL/L (ref 98–107)
CODING SYSTEM: NORMAL
CREAT SERPL-MCNC: 1.5 MG/DL (ref 0.51–0.95)
CROSSMATCH: NORMAL
DIASTOLIC BLOOD PRESSURE - MUSE: NORMAL MMHG
EGFRCR SERPLBLD CKD-EPI 2021: 37 ML/MIN/1.73M2
ELLIPTOCYTES BLD QL SMEAR: SLIGHT
EOSINOPHIL # BLD AUTO: 0 10E3/UL (ref 0–0.7)
EOSINOPHIL NFR BLD AUTO: 0 %
ERYTHROCYTE [DISTWIDTH] IN BLOOD BY AUTOMATED COUNT: 26.5 % (ref 10–15)
FRAGMENTS BLD QL SMEAR: SLIGHT
GLUCOSE SERPL-MCNC: 82 MG/DL (ref 70–99)
HCO3 SERPL-SCNC: 22 MMOL/L (ref 22–29)
HCT VFR BLD AUTO: 17.3 % (ref 35–47)
HGB BLD-MCNC: 5.6 G/DL (ref 11.7–15.7)
HGB BLD-MCNC: 7.3 G/DL (ref 11.7–15.7)
IMM GRANULOCYTES # BLD: 0 10E3/UL
IMM GRANULOCYTES NFR BLD: 0 %
INTERPRETATION ECG - MUSE: NORMAL
ISSUE DATE AND TIME: NORMAL
LYMPHOCYTES # BLD AUTO: 0.9 10E3/UL (ref 0.8–5.3)
LYMPHOCYTES NFR BLD AUTO: 16 %
MAGNESIUM SERPL-MCNC: 2.2 MG/DL (ref 1.7–2.3)
MCH RBC QN AUTO: 30.6 PG (ref 26.5–33)
MCHC RBC AUTO-ENTMCNC: 32.4 G/DL (ref 31.5–36.5)
MCV RBC AUTO: 95 FL (ref 78–100)
MONOCYTES # BLD AUTO: 0.4 10E3/UL (ref 0–1.3)
MONOCYTES NFR BLD AUTO: 7 %
NEUTROPHILS # BLD AUTO: 4.4 10E3/UL (ref 1.6–8.3)
NEUTROPHILS NFR BLD AUTO: 76 %
NRBC # BLD AUTO: 0 10E3/UL
NRBC BLD AUTO-RTO: 0 /100
P AXIS - MUSE: 65 DEGREES
PHOSPHATE SERPL-MCNC: 2.9 MG/DL (ref 2.5–4.5)
PLAT MORPH BLD: ABNORMAL
PLATELET # BLD AUTO: 74 10E3/UL (ref 150–450)
POTASSIUM SERPL-SCNC: 3.4 MMOL/L (ref 3.4–5.3)
PR INTERVAL - MUSE: 150 MS
PROT SERPL-MCNC: 4.6 G/DL (ref 6.4–8.3)
QRS DURATION - MUSE: 64 MS
QT - MUSE: 418 MS
QTC - MUSE: 454 MS
R AXIS - MUSE: 79 DEGREES
RBC # BLD AUTO: 1.83 10E6/UL (ref 3.8–5.2)
RBC MORPH BLD: ABNORMAL
SODIUM SERPL-SCNC: 141 MMOL/L (ref 135–145)
SPECIMEN EXP DATE BLD: NORMAL
SYSTOLIC BLOOD PRESSURE - MUSE: NORMAL MMHG
T AXIS - MUSE: 68 DEGREES
TARGETS BLD QL SMEAR: SLIGHT
UNIT ABO/RH: NORMAL
UNIT NUMBER: NORMAL
UNIT STATUS: NORMAL
UNIT TYPE ISBT: 5100
VENTRICULAR RATE- MUSE: 71 BPM
WBC # BLD AUTO: 5.8 10E3/UL (ref 4–11)

## 2025-03-13 PROCEDURE — 82374 ASSAY BLOOD CARBON DIOXIDE: CPT

## 2025-03-13 PROCEDURE — 93005 ELECTROCARDIOGRAM TRACING: CPT

## 2025-03-13 PROCEDURE — 86900 BLOOD TYPING SEROLOGIC ABO: CPT | Performed by: STUDENT IN AN ORGANIZED HEALTH CARE EDUCATION/TRAINING PROGRAM

## 2025-03-13 PROCEDURE — 250N000013 HC RX MED GY IP 250 OP 250 PS 637

## 2025-03-13 PROCEDURE — P9016 RBC LEUKOCYTES REDUCED: HCPCS | Performed by: STUDENT IN AN ORGANIZED HEALTH CARE EDUCATION/TRAINING PROGRAM

## 2025-03-13 PROCEDURE — 85018 HEMOGLOBIN: CPT | Performed by: STUDENT IN AN ORGANIZED HEALTH CARE EDUCATION/TRAINING PROGRAM

## 2025-03-13 PROCEDURE — P9047 ALBUMIN (HUMAN), 25%, 50ML: HCPCS | Mod: JZ | Performed by: STUDENT IN AN ORGANIZED HEALTH CARE EDUCATION/TRAINING PROGRAM

## 2025-03-13 PROCEDURE — 250N000011 HC RX IP 250 OP 636: Mod: JZ | Performed by: STUDENT IN AN ORGANIZED HEALTH CARE EDUCATION/TRAINING PROGRAM

## 2025-03-13 PROCEDURE — 97165 OT EVAL LOW COMPLEX 30 MIN: CPT | Mod: GO

## 2025-03-13 PROCEDURE — 250N000013 HC RX MED GY IP 250 OP 250 PS 637: Performed by: STUDENT IN AN ORGANIZED HEALTH CARE EDUCATION/TRAINING PROGRAM

## 2025-03-13 PROCEDURE — 97165 OT EVAL LOW COMPLEX 30 MIN: CPT | Mod: GO | Performed by: STUDENT IN AN ORGANIZED HEALTH CARE EDUCATION/TRAINING PROGRAM

## 2025-03-13 PROCEDURE — 250N000013 HC RX MED GY IP 250 OP 250 PS 637: Performed by: INTERNAL MEDICINE

## 2025-03-13 PROCEDURE — 83735 ASSAY OF MAGNESIUM: CPT

## 2025-03-13 PROCEDURE — 97535 SELF CARE MNGMENT TRAINING: CPT | Mod: GO | Performed by: STUDENT IN AN ORGANIZED HEALTH CARE EDUCATION/TRAINING PROGRAM

## 2025-03-13 PROCEDURE — 85025 COMPLETE CBC W/AUTO DIFF WBC: CPT

## 2025-03-13 PROCEDURE — 93010 ELECTROCARDIOGRAM REPORT: CPT | Performed by: INTERNAL MEDICINE

## 2025-03-13 PROCEDURE — 86923 COMPATIBILITY TEST ELECTRIC: CPT | Performed by: STUDENT IN AN ORGANIZED HEALTH CARE EDUCATION/TRAINING PROGRAM

## 2025-03-13 PROCEDURE — 250N000011 HC RX IP 250 OP 636: Mod: JZ | Performed by: PHARMACIST

## 2025-03-13 PROCEDURE — 99233 SBSQ HOSP IP/OBS HIGH 50: CPT | Performed by: STUDENT IN AN ORGANIZED HEALTH CARE EDUCATION/TRAINING PROGRAM

## 2025-03-13 PROCEDURE — G0463 HOSPITAL OUTPT CLINIC VISIT: HCPCS

## 2025-03-13 PROCEDURE — 250N000011 HC RX IP 250 OP 636: Performed by: INTERNAL MEDICINE

## 2025-03-13 PROCEDURE — 120N000002 HC R&B MED SURG/OB UMMC

## 2025-03-13 PROCEDURE — 84100 ASSAY OF PHOSPHORUS: CPT

## 2025-03-13 PROCEDURE — 99418 PROLNG IP/OBS E/M EA 15 MIN: CPT | Performed by: STUDENT IN AN ORGANIZED HEALTH CARE EDUCATION/TRAINING PROGRAM

## 2025-03-13 PROCEDURE — 99233 SBSQ HOSP IP/OBS HIGH 50: CPT | Mod: GC | Performed by: INTERNAL MEDICINE

## 2025-03-13 PROCEDURE — 97535 SELF CARE MNGMENT TRAINING: CPT | Mod: GO

## 2025-03-13 PROCEDURE — 99233 SBSQ HOSP IP/OBS HIGH 50: CPT | Performed by: INTERNAL MEDICINE

## 2025-03-13 RX ORDER — ALBUMIN (HUMAN) 12.5 G/50ML
25 SOLUTION INTRAVENOUS ONCE
Status: COMPLETED | OUTPATIENT
Start: 2025-03-13 | End: 2025-03-13

## 2025-03-13 RX ORDER — DEXTROSE MONOHYDRATE 100 MG/ML
INJECTION, SOLUTION INTRAVENOUS CONTINUOUS PRN
Status: DISCONTINUED | OUTPATIENT
Start: 2025-03-13 | End: 2025-03-28

## 2025-03-13 RX ORDER — OXYCODONE HYDROCHLORIDE 10 MG/1
10 TABLET ORAL
Status: DISCONTINUED | OUTPATIENT
Start: 2025-03-13 | End: 2025-03-17

## 2025-03-13 RX ORDER — LIDOCAINE HYDROCHLORIDE 20 MG/ML
JELLY TOPICAL ONCE
Status: DISCONTINUED | OUTPATIENT
Start: 2025-03-13 | End: 2025-03-13

## 2025-03-13 RX ORDER — BUPRENORPHINE 10 UG/H
1 PATCH TRANSDERMAL WEEKLY
Status: DISCONTINUED | OUTPATIENT
Start: 2025-03-13 | End: 2025-03-17

## 2025-03-13 RX ORDER — HEPARIN SODIUM (PORCINE) LOCK FLUSH IV SOLN 100 UNIT/ML 100 UNIT/ML
5-10 SOLUTION INTRAVENOUS
Status: DISCONTINUED | OUTPATIENT
Start: 2025-03-13 | End: 2025-03-18

## 2025-03-13 RX ORDER — METOCLOPRAMIDE HYDROCHLORIDE 5 MG/ML
10 INJECTION INTRAMUSCULAR; INTRAVENOUS ONCE
Status: DISCONTINUED | OUTPATIENT
Start: 2025-03-13 | End: 2025-03-13

## 2025-03-13 RX ORDER — OXYCODONE HCL 5 MG/5 ML
10 SOLUTION, ORAL ORAL
Status: DISCONTINUED | OUTPATIENT
Start: 2025-03-13 | End: 2025-03-17

## 2025-03-13 RX ORDER — HEPARIN SODIUM,PORCINE 10 UNIT/ML
5-10 VIAL (ML) INTRAVENOUS EVERY 24 HOURS
Status: DISCONTINUED | OUTPATIENT
Start: 2025-03-13 | End: 2025-03-18

## 2025-03-13 RX ORDER — HEPARIN SODIUM,PORCINE 10 UNIT/ML
5-10 VIAL (ML) INTRAVENOUS
Status: DISCONTINUED | OUTPATIENT
Start: 2025-03-13 | End: 2025-03-18

## 2025-03-13 RX ADMIN — Medication 5 ML: at 14:28

## 2025-03-13 RX ADMIN — POLYETHYLENE GLYCOL 3350 17 G: 17 POWDER, FOR SOLUTION ORAL at 08:20

## 2025-03-13 RX ADMIN — HYDROMORPHONE HYDROCHLORIDE 0.5 MG: 1 INJECTION, SOLUTION INTRAMUSCULAR; INTRAVENOUS; SUBCUTANEOUS at 20:09

## 2025-03-13 RX ADMIN — LIDOCAINE 4% 1 PATCH: 40 PATCH TOPICAL at 08:25

## 2025-03-13 RX ADMIN — THIAMINE HCL TAB 100 MG 100 MG: 100 TAB at 16:30

## 2025-03-13 RX ADMIN — Medication 2.5 MG: at 20:12

## 2025-03-13 RX ADMIN — OXYCODONE HYDROCHLORIDE 10 MG: 5 SOLUTION ORAL at 13:03

## 2025-03-13 RX ADMIN — ALBUMIN HUMAN 25 G: 0.25 SOLUTION INTRAVENOUS at 17:42

## 2025-03-13 RX ADMIN — ALBUMIN HUMAN 25 G: 0.25 SOLUTION INTRAVENOUS at 17:05

## 2025-03-13 RX ADMIN — ACETAMINOPHEN 975 MG: 325 TABLET, FILM COATED ORAL at 08:19

## 2025-03-13 RX ADMIN — Medication 1 SPRAY: at 16:29

## 2025-03-13 RX ADMIN — Medication 1 SPRAY: at 08:33

## 2025-03-13 RX ADMIN — ACETAMINOPHEN 975 MG: 325 TABLET, FILM COATED ORAL at 16:29

## 2025-03-13 RX ADMIN — Medication 1 SPRAY: at 20:13

## 2025-03-13 RX ADMIN — Medication 2.5 MG: at 16:30

## 2025-03-13 RX ADMIN — HEPARIN 5 ML: 100 SYRINGE at 14:32

## 2025-03-13 RX ADMIN — ALBUMIN HUMAN 25 G: 0.25 SOLUTION INTRAVENOUS at 18:07

## 2025-03-13 RX ADMIN — ENOXAPARIN SODIUM 40 MG: 40 INJECTION SUBCUTANEOUS at 08:19

## 2025-03-13 RX ADMIN — DIPHENHYDRAMINE HYDROCHLORIDE AND LIDOCAINE HYDROCHLORIDE AND ALUMINUM HYDROXIDE AND MAGNESIUM HYDRO 10 ML: KIT at 17:11

## 2025-03-13 RX ADMIN — BUPRENORPHINE 1 PATCH: 10 PATCH, EXTENDED RELEASE TRANSDERMAL at 13:03

## 2025-03-13 RX ADMIN — OXYCODONE HYDROCHLORIDE 10 MG: 10 TABLET ORAL at 09:35

## 2025-03-13 RX ADMIN — ALBUMIN HUMAN 25 G: 0.25 SOLUTION INTRAVENOUS at 16:31

## 2025-03-13 ASSESSMENT — ACTIVITIES OF DAILY LIVING (ADL)
ADLS_ACUITY_SCORE: 98
ADLS_ACUITY_SCORE: 95
ADLS_ACUITY_SCORE: 98
ADLS_ACUITY_SCORE: 94
ADLS_ACUITY_SCORE: 95
ADLS_ACUITY_SCORE: 94
ADLS_ACUITY_SCORE: 95
ADLS_ACUITY_SCORE: 98
ADLS_ACUITY_SCORE: 94
ADLS_ACUITY_SCORE: 95
ADLS_ACUITY_SCORE: 98
ADLS_ACUITY_SCORE: 95
ADLS_ACUITY_SCORE: 95
ADLS_ACUITY_SCORE: 98
ADLS_ACUITY_SCORE: 94
ADLS_ACUITY_SCORE: 98
ADLS_ACUITY_SCORE: 98
ADLS_ACUITY_SCORE: 95

## 2025-03-13 NOTE — PROGRESS NOTES
"[facility] Nurse Inpatient Assessment     Consulted for: coccyx  3/7: New consult for wounds to bilateral legs    Summary: known by [facility] from previous admission     WO nurse follow-up plan: weekly    Patient History (according to provider note(s):      The patient is a 69 year old female, with an extensive PMHx which is nicely documented in Dr. Khan and Mis's note from the ER.  Per their notes:  Ms. 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  The patient reportedly was feeling sick and her \"caretakers\" wanted her seen by a physician.  Apparently she has not been feeling well since her last discharge from the hospital with increased confusion, poor appetite and increased generalized weakness which now she is clearly confined to her bed because of this. Her urine output(UOP) has been foul smelling and lower amounts.  The patient refused to come to the ER 2 days ago.  The patient denies any other complaints but again has a difficult time offering a cogent history as to how she is feeling and what she's complaining of.  The remainder of the history of present illness is limited given the patient's altered mental status    Assessment:      Areas visualized during today's visit: Focused: and Lower extremities     Wound location: Bilateral legs    Left medial lower leg      Left posterior thigh      Right Calf      Right hip      Last photo: 3/13  Wound due to:  Edema blisters  Wound history/plan of care: Pt had large edema blisters on a previous admission that unroofed. Large wound on left medial leg 75% epithelialized.  Wound base: 50 % Dermis, 50 % new pink Epidermis -     Palpation of " the wound bed: normal      Drainage: small     Description of drainage: serous     Measurements (length x width x depth, in cm): See photos     Tunneling: N/A     Undermining: N/A  Periwound skin: Intact, Ecchymosis, and Edematous - there is purple mottling scattered around bilateral legs      Color: normal and consistent with surrounding tissue      Temperature: normal   Odor: none  Pain: moderate and during dressing change, aching  Pain interventions prior to dressing change: slow and gentle cares   Treatment goal: Heal  and Protection  STATUS: healing  Supplies ordered: at bedside     Pressure Injury Location: Sacrum/coccyx                            2/18      Last photo: 3/13  Wound type: Pressure Injury and Shear     Pressure Injury Stage: site of previously healed pressure injury, present on admission     Wound history/plan of care:   unknown worst stage, currently appears to be a stage 2 but was possibly deeper. Was noted as a reinjury on her last assessment here 12/4/24 and 1/27/25  Wound base: 100 % Fibrin of open area with purple intact around.      Palpation of the wound bed: normal      Drainage: small     Description of drainage: none     Measurements (length x width x depth, in cm) total area 7 x 4 x 0.1 cm   Periwound skin: Intact and Erythema- blanchable      Color: normal and consistent with surrounding tissue      Temperature: normal   Odor: none  Pain: moderate, tender  Pain intervention prior to dressing change: slow and gentle cares   Treatment goal: Heal   STATUS: stable  Supplies ordered: discussed with RN and discussed with patient       Treatment Plan:     sacrum wound(s): Every other day and PRN cleanse with wound cleanser and pat dry. Paint quang wound skin with no sting. Apply Triad paste(order#[id number]) over open wound and cover with sacral mepilex. AVOID supine position if able.     Bilateral lower legs wounds: Every other day: remove dressings and wash wounds with Vashe and gauze. Pat  "dry. Cover open areas with Vaseline gauze and secure with large Mepilex or ABD and stretch netting. If using Mepilex, note patient has very delicate skin so care should be taken to prevent worsening skin tears when removing.     Pressure Injury Prevention (PIP) Plan:  If patient is declining pressure injury prevention interventions: Explore reason why and address patient's concerns, Educate on pressure injury risk and prevention intervention(s), If patient is still declining, document \"informed refusal\" , and Ensure Care team is aware ( provider, charge nurse, etc)  Mattress: Follow bed algorithm, add Low Air Loss (Air+) mattress pump if skin is very moist or constantly moist.   HOB: Maintain at or below 30 degrees, unless contraindicated  Repositioning in bed: Every 1-2 hours , Left/right positioning; avoid supine, Raise foot of bed prior to raising head of bed, to reduce patient sliding down (shear), and Frequent microturns using positioning wedges, as patient tolerates  Heels: Pillows under calves  Protective Dressing: Sacral Mepilex for prevention (#244160),  especially for the agitated patient   Positioning Equipment:None  Chair positioning: Chair cushion (#[id number]) , Assist patient to reposition hourly, and Do NOT use a donut for sitting (this increases pressure to smaller area and creates a higher potential for injury)    If patient has a buttock pressure injury, or high risk for PI use chair cushion or SPS.  Moisture Management: Perineal cleansing /protection: Follow Incontinence Protocol, Avoid brief in bed, Clean and dry skin folds with bathing , Consider InterDry (#189374) between folds, and Moisturize dry skin  Under Devices: Inspect skin under all medical devices during skin inspection , Ensure tubes are stabilized without tension, and Ensure patient is not lying on medical devices or equipment when repositioned  Ask provider to discontinue device when no longer needed.     Orders: Reviewed    RECOMMEND " PRIMARY TEAM ORDER: None, at this time  Education provided: importance of repositioning, plan of care, and wound progress  Discussed plan of care with: Patient and Nurse  Notify [facility] if wound(s) deteriorate.  Nursing to notify the Provider(s) and re-consult the [facility] Nurse if new skin concern.    DATA:     Current support surface: Standard  Standard gel mattress (Isoflex)  Containment of urine/stool: Incontinence Protocol and Indwelling catheter  BMI: Body mass index is 23.9 kg/m .   Active diet order: Orders Placed This Encounter      Combination Diet Regular Diet Adult     Output: I/O last 3 completed shifts:  In: 1150 [P.O.:650]  Out: 125 [Urine:125]     Labs:   Recent Labs   Lab 03/13/25  0652 03/12/25  0534 03/11/25  0507   ALBUMIN 3.2*   < >  --    HGB  --   --  8.2*   WBC  --   --  8.1    < > = values in this interval not displayed.     Pressure injury risk assessment:   Sensory Perception: 2-->very limited  Moisture: 3-->occasionally moist  Activity: 1-->bedfast  Mobility: 2-->very limited  Nutrition: 1-->very poor  Friction and Shear: 1-->problem  Yogi Score: 10    Gaurang Barba RN, CWOCN  Pager no longer in use, please contact through eshtery group: [facility] Nurse [place]    Dept. Office Number: [phone]

## 2025-03-13 NOTE — PLAN OF CARE
Goal Outcome Evaluation:    Plan of Care Reviewed With: patient  Overall Patient Progress: no change      Dressing on wounds in thighs changed by WOCN, interdry applied I between thighs for weeping edema.  Incontinent of medium soft stool.   /59 (BP Location: Left arm, Cuff Size: Adult Small)   Pulse 76   Temp 96.6  F (35.9  C) (Tympanic)   Resp 20   Wt 61.2 kg (134 lb 14.7 oz)   SpO2 100%   BMI 23.90 kg/m    Had episodes of bradycardia-MD notified, EKG ordered.    Oriented to self and place.   Pt has been moaning, sched tylenol and prn oxycodone given(crushed and mixed with apple sauce or pudding).  LS dim at the bases.  Incontinent of small stools. Suprapubic cath with small output-cloudy urine(nephro aware). CHG/bed bath done.  Poor appetite but able to take sips of protein drink and pudding.  Reposition to sides.t.  Coccyx wound re-dressed.  Mepilexes in thighs and heels are intact, BLE swelling are weeping, lymphedema wraps placed today. Interdry in between thighs.  Port with +blood return, port needle changed today.  Tolerated 1 unit PRBCs of Hemoglobin of 5.6.  Needing Albumin.  Feeding tube per radiology.  Will continue to provide emotional support and continue with POC.

## 2025-03-13 NOTE — PLAN OF CARE
Goal Outcome Evaluation:     Plan of Care Reviewed With: patient     Overall Patient Progress: declining     0214-1099 Pt VSS on RA. AxO to self only. Razia hugger on, Temp remains normal. Pt refuses most cares today; pt refuses oral medications. Pt's appetite remains very poor, with patient refusing food except for some premier protein. Pt often asks to be repositioned, but expresses severe pain when being turned. Pt remains hunched over in bed. Shahid in, small amounts of urine noted. Pt incontinent of stool, no BM today. Pt's port needle needed changing today, pt refused this care, and began yelling when attempted to reposition. Pt reports extreme pain, IV dilaudid given every two hours. Pt not OOB this shift. Continue with plan of care.

## 2025-03-13 NOTE — PROGRESS NOTES
Calorie Count  Intake recorded for: 3/12  Total Kcals: 0 Total Protein: 0g  Kcals from Hospital Food: 0   Protein: 0g  Kcals from Outside Food (average):0 Protein: 0g  # Meals Ordered from Kitchen: 0  # Meals Recorded: 0 meals recorded  # Supplements Recorded: 0

## 2025-03-13 NOTE — PLAN OF CARE
Goal Outcome Evaluation: [id number]      Plan of Care Reviewed With: patient    Overall Patient Progress: no change    Pt confused, oriented only to self and refusing several cares as well as vitals. Pt remains rigid and hunched in posture, & unable to unfold from this position. However, pt does appear overnight to be in less pain compared to yesterday night, rFLACC score 3 on two occasions & pt minimally yelling out. Suprapubic catheter draining small amounts of cloudy, yellow to rahel urine. No BM overnight. Pt requires assist of 2, bed alarm in place for safety. Plan for GOC discussion today. Continue with POC.

## 2025-03-13 NOTE — PHARMACY-CONSULT NOTE
Pharmacy Tube Feeding Consult    Medication reviewed for administration by feeding tube and for potential food/drug interactions.    Recommendation: No changes are needed at this time.     Pharmacy will continue to follow as new medications are ordered.   Lane LaroseD

## 2025-03-13 NOTE — PROGRESS NOTES
"  PALLIATIVE CARE PROGRESS NOTE  [facility]     Patient Name: Alis Hartman  Date of Admission: 3/4/2025   Today the patient was seen for: goals of care     Recommendations & Counseling       GOALS OF CARE:   We still need to have care conference with family. There was a plan for a 12pm care conference today but family did not show up until 1pm and by that time providers had other patient care needs they needed to tend to and so care conference will need to be re-scheduled.   We (medicine, palliative and Dr. Hyde - her long term gyn/onc provider) had planned during care conference today to attempt an informed non-dissent plan for hospice. Specifically we planned to inform daughter Joy that we would like to focus on comfort, not place a feeding tube (because she has told Dr. Hyde in past she didn't want it and she didn't improve despite trying feeding tube last admission), change code status to DNR/DNI, start scheduled medications for agitation and pain and pursue hospice.  And if Joy did not oppose this plan we were going to move forward with it and if she did oppose it we were going to place an ethics consult.   Since we did not get to have this meeting today and need to re-schedule it, in the meantime I agree that it makes sense to start some medications to help her uncontrolled symptoms and to place an ethics consult out of concern that patient is suffering and family had not been able and available to make important care decisions.       ADVANCE CARE PLANNING:  Previously completed HCD naming Aranza (daughter) as HCA, appears this was not dated and deemed \"invalid.\" Aranza confirmed during last admission that Joy is her surrogate decision maker per notes in chart.   There is a POLST form on file, this was reviewed and current.  Code status: Full Code     MEDICAL MANAGEMENT:   #Pain, chronic, low back and abdominal after surgery in July. On " "chronic prn opioids in varying doses x >10 years. Was down to 5mg oxycodone q 6 hours, but in recent month dose increased up to 20mg q 6 hours prn during last hospitalization.    #Pain, L thigh  #Pain, sacral/coccyx wound and B arm and leg wounds  Remains hunched over in bed today, agitated, confused and in pain but is unable to elaborate further   - start butrans patch 10mcg/hr                   - will take 3 days to reach full effect.                   - Can be increased further. If over time not effective would consider rotation to fentanyl patch or methadone  - Acetaminophen 975mg q8hr  - Oxycodone to 10mg q3hr prn  - Hydromorphone to 0.5mg q2 hr prn for pain not controlled with PO meds.   - Encourage bowel regimen     # agitation and confusion:  - start olanzapine 2.5mg bid per psychiatry recommendations     PSYCHOSOCIAL/SPIRITUAL:  Family: Daughter (Joy) and grandchildren, son (Hemanth), niece (Armida)  Joy mentions things being \"God's plan\", though specific Yazidi needs/affiliations not addressed     Palliative Care will continue to follow. Thank you for the consult and allowing us to aid in the care of Alis Hartman.    These recommendations have been discussed with primary team.     Ritika Franco MD / Palliative Medicine   Securely message with the Vocera Web Console (learn more here)   Text page via [facility] Paging/Directory        Assessment          Alis Hartman is a 69 year old female with a past medical history of cervical cancer s/p radiation, seriuos adenocarcinoma s/p SNEHA/BSO and cystotomy w/ suprapubic catheter placement 07/2024 and 3 cycles of carbo/taxol (10/2024), hx ESBL urosepsis and bacteremia, RNY gastric bypass, afib on apixaban, HTN, CKD stage 3, and multiple recent admissions (most recently 2/5-2/25 with MADHU, deconditioning, and malnutrition). She is now admitted with concern for UTI and failure to thrive/general weakness.      The patient's daughter Joy met with " Dr. Perez of gyn-onc and goals of care were discussed. She discussed hospice and palliative care has been consulted to continue goals of care discussion, including discussion of hospice and pain management.      We had a care conference on 3/7 and again on 3/10. Spoke with Joy again on 3/11.  Daughter (Joy) expresses that she is very overwhelmed by her mother's condition and the possibility of her dying. Goals are currently restorative though we are continuing to discuss options with Joy, including hospice.     Repeat care conference planned for 12pm 3/13- daughter did not show up until 1pm and providers needed to reschedule meeting     Today, the patient was seen for:  Goal of care  Family support  Symptom management  Palliative encounter      Interval History:     Multidisciplinary collaboration:  Ongoing frequent calling out with agitation and pain      Notable medications:  Acetaminophen 975 mg q8h (refusing most doses)  Hydromorphone IV PRN- 1 mg over last 24 hours  Lidocaine patch (refused today)  Magic mouthwash scheduled- not using  Miralax daily- not using  Senna at bedtime - did not take yesterday  Oxycodone PRN - 10mg (1 dose) yesterday, x1 dose today      Patient/family narrative  Saw today, hunched over in bed. Patient repeatedly asks for help sitting up. When I help her to sit up, she is unable to maintain the position. She cannot lift her head up. She does not answer questions (Where are we? What hurts? Did you eat anything today?).     Review of Systems:     Besides above, ROS was reviewed and is unremarkable        Physical Exam:   BP: (151)/(95) 151/95  SpO2:  [100 %] 100 %  134 lbs 14.74 oz    Gen: Sitting in bed hunched forward, chronically ill appearing, repeats same thing over and over again  Neuro: Awake, does not answer orientation questions.     Pulm: nonlabored breathing, comfortable on room air, no cough  CV: RRR by peripheral pulse, noncyanotic   ABD: Unable to examine  MSK:   BLE edema  Skin: Warm, dry, no rashes or abrasions on exposed skin. Chronic wounds not exposed        Data Reviewed:     Results for orders placed or performed during the hospital encounter of 03/04/25 (from the past 24 hours)   CBC with Platelets & Differential    Narrative    The following orders were created for panel order CBC with Platelets & Differential.  Procedure                               Abnormality         Status                     ---------                               -----------         ------                     CBC with platelets and ...[[id number]]                                                   Please view results for these tests on the individual orders.     Recent Labs   Lab 03/12/25  0534 03/11/25  0507 03/11/25  0309 03/10/25  0529   WBC  --  8.1 7.5 6.9   HGB  --  8.2* 7.9* 7.3*   MCV  --  94 91 91   PLT  --  148* 135* 118*     --  139 138   POTASSIUM 3.6  --  3.6 3.6   CHLORIDE 110*  --  109* 109*   CO2 22  --  22 22   BUN 31.7*  --  28.1* 27.2*   CR 1.51*  --  1.29* 1.27*   ANIONGAP 9  --  8 7   SAMUEL 8.2*  --  8.4* 8.3*   GLC 86  --  93 89   ALBUMIN 2.3*  --  2.4* 2.3*   PROTTOTAL 4.6*  --  4.6* 4.4*   BILITOTAL 0.5  --  0.5 0.4   ALKPHOS 167*  --  174* 153*   ALT 26  --  29 27   AST 24  --  28 34       No results found for this or any previous visit (from the past 24 hours).      Medical Decision Making       MANAGEMENT DISCUSSED with the following over the past 24 hours: medicine team, Dr. Zamarripa from gyn/onc   NOTE(S)/MEDICAL RECORDS REVIEWED over the past 24 hours: medicine and nursing notes, MAR review  Tests REVIEWED in the past 24 hours:  - CMP  - CBC

## 2025-03-13 NOTE — PROGRESS NOTES
CLINICAL NUTRITION SERVICES - BRIEF NOTE     Nutrition Prescription    RECOMMENDATIONS FOR MDs/PROVIDERS TO ORDER:  Adjustments to free water flushes per provider discretion    Recommendations already ordered by Registered Dietitian (RD):  Once NG tube placed/placement confirmed and provider approves ready to start TF:  -- Start Jevity 1.5 @ 10 mL/hr and advance by 10 mL q12h as tolerated and if K+ > or = 3, Mg++ > or = 1.5, and phos > or = 1.9 to goal rate 50  mL/hr  -- 60 mL q4h free water flushes for FT patency (does not provide full estimated nutrition needs)  -- Elevate HOB per gastric feeding precautions (if with gastric feeding tube access)  -- Thiamine (100 mg/day) x 10 days due to refeeding risk    TF GOAL: Jevity 1.5 Aj (or equivalent)  @ goal of  50ml/hr  (1200ml/day) provides: 1800 kcals (30 kcal/kg), 76 g PRO (1.25 g/kg), 912 ml free H20, 258 g CHO, and 25 g fiber daily.        *Received consult: Registered Dietitian to order TF per Medical Nutrition Therapy Guidelines     Diet: Regular    NEW FINDINGS   Per RN, plan is for radiology to attempt FT placement today to start TF.  Per team, plan is temporary TF for now while GOC conversations are ongoing.     Labs:  - K+, Mg++, and Phos WNL. CMP, Mg++, and Phos ordered daily       INTERVENTIONS  See above    Monitoring/Evaluation  Progress toward goals will be monitored and evaluated per protocol.      Temitope Vasquez RD, , LD  Weekday Units covered: 5A (non-Heme Onc pts) and 7B ([id number])  Available by 5A or 7B Clinical Dietitijennifer Garcia  Weekend Coverage: Weekend Clinical Dietitijennifer Garcia    Inpatient Clinical Dietitians no longer available via paging

## 2025-03-13 NOTE — PROGRESS NOTES
Care Plan Discussion    Joy was unable to get to the hospital while Palliative Care and primary team were still available, but I was able to meet with Joy and Aranza to discuss a few points.     Joy is very clear that she believes her mother would want to be full code. Joy reports her family also wants this. She herself is not sure that she would make that same decision but is very clear she is making the decision on behalf of the patient based on things her mother has said and acting in accordance with what her mother would want.     She also wants her mother to have nutrition. She believes her mother would want to have feeding tube nutrition and would like a time limited trial of nutrition via feeding tube to see if there is improvement. She feels her mother was ok with the previous feeding tube (NJ) intiially and thinks she would be ok with this for a short time. We discussed the concerns in regards to challenges with placement, the medical concern that even with nutrition we may not be changing the outcome, but she feels like they are not ready to change the goals of care without a trial of nutrition. We did briefly discuss that if nasal feeding tube cannot be placed sometimes there is a bigger discussion around percutaneous gastric tube because this is a longer term tube and requires a procedure. She is aware there may be more discussion about this if for some reason it is felt that would be the only option.    I did discuss that all of the medical teams involved feel it is important that we start long acting pain medications to help with her pain as well as to start olanzapine to help her feel calmer and feel better. The patients daughter assents to these recommendations. We discussed that if she has concerns about side effects after her mom has started these she should bring those up to the team (she has a big concern about psychiatric medications causing sedation based on previous experience).      We also discussed that with her mother's medical condition she is not going to be able to discharge to home. She is not ready to discharge at this time, so we will defer those discussions to the time when it is more pressing of a concern.     I spoke with the primary team and palliative care about this conversation. They will follow-up with Joy to discuss duration of nutrition trial, etc.     We will continue to follow along peripherally.     Dany Perez MD  Gynecologic Oncology

## 2025-03-13 NOTE — PROGRESS NOTES
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 Hospital stay complicated with MADHU and increased LE swelling and anasarca.    ##MADHU:  --Multifactorial; likely 2/2 poor oral intake, FTT, and deconditioing causing ischemic ATN.  --Pt did not respond to IV fluids and started to have 3rd spacing, worsening LE edema.  --Renal imaging ruled out obstruction and hydronephrosis, other possible causes is calcium oxalate nephropathy as pt has hx of gastric bypass surgery.  --UA showed large blood, LE +ve, WBC >182, RBC >182, Moderate bacteria, yeast +ve, hyaline cast +ve and urine specific gravity 1.02.  --Urine Na 25, indicating hypovolemia.  --Cr same 1.5 03/13/2025.  --Recommended supporting intravascular volume by giving Albumin 100g 25% once again today and monitoring kidney function, albumin level is 3.2 improving.  --Last TTE back in 11/2024 had EF 60-65%, however given the worsening LE edema post IV fluids may consider repeating it if no improvement, would also consider giving Lasix if no improvement.  --Agree with strict calorie count per primary team, and try to encourage oral intake as tolerated.  --Will hold on further GN workup as pt is not a good candidate for renal biopsy and immunosuppressants.  --Strict I&Os.  --Adjust all meds to kidney  function.  --Avoid Nephrotoxins.      Recommendations were communicated to primary team via Vocera and Note.    Seen and discussed with Dr. Alston.    Navneet Sims MD  Division of Renal Disease and Hypertension  [facility]  Th[facility] StackDe[facility] CenterPeerby Web Console        REASON FOR CONSULT: MADHU    Interval Events:  Pt is still refusing to eat/drink or taking meds; 0 calorie count yesterday.  Plan for care conference with family to discuss goals of care today or tomorrow.    PAST MEDICAL HISTORY:  Reviewed with patient on 03/13/2025     Past Medical History:   Diagnosis Date    Atrial fibrillation (H)     Depression     Hypertension     Malignant neoplasm of endocervix (H)     Tx with radiation    Near syncope 11/7/2024    Orthopnea 9/21/2024    Other chronic pain     Low back    Schizophrenia (H)     Urinary incontinence        Past Surgical History:   Procedure Laterality Date    ABDOMINOPLASTY      BIOPSY CERVICAL, LOCAL EXCISION, SINGLE/MULTIPLE  10/26/2011    Procedure:BIOPSY CERVICAL, LOCAL EXCISION, SINGLE/MULTIPLE; EUA, cervical biopsies; Surgeon:BETTY TINEO; Location:[facility] OR    BIOPSY VAGINAL N/A 06/08/2021    Procedure: BIOPSY, VAGINA;  Surgeon: Dany Perez MD;  Location: [facility]    CYSTOSCOPY N/A 05/17/2024    Procedure: Cystoscopy;  Surgeon: Dany Perez MD;  [facility]    CYSTOSTOMY, INSERT TUBE SUPRAPUBIC, COMBINED  07/12/2024    Procedure: Insertion of supra-pubic catheter;  Surgeon: Dany Perez MD;  [facility]    DILATION AND CURETTAGE, WITH ULTRASOUND GUIDANCE N/A 05/17/2024    Procedure: Dilation and curettage of the uterus with drainage of uterine fluid under ultrasound guidance, lysis of vaginal adhesions;  Surgeon: Dany Perez MD;  [facility]    EXAM UNDER ANESTHESIA PELVIC N/A 03/12/2020    Procedure: Exam under anesthsia, vaginal biopsies, possible CO2 laser of the vagina;  Surgeon: Lilliam Roy MD;  [facility]    GI SURGERY  2004    gastric bypass     HYSTERECTOMY TOTAL ABDOMINAL, BILATERAL SALPINGO-OOPHORECTOMY, COMBINED Bilateral 07/12/2024    Procedure: Diagnostic laparoscopy converted to exploratory laparotomy, total abdominal hysterectomy, bilateral salpingo-oophorectomy, lysis of adhesions >60 min;  Surgeon: Dany Perez MD;  [facility]    INSERT PORT VASCULAR ACCESS N/A 08/26/2024    Procedure: Insert port vascular access;  Surgeon: Avery Gregory MD;  Location: [facility]    IR CHEST PORT PLACEMENT > 5 YRS OF AGE  08/26/2024    IR FOLLOW UP VISIT OUTPATIENT  1/7/2025    IR NEPHROSTOMY TUBE PLACEMENT LEFT  11/29/2024    LASER CO2 VAGINA N/A 03/02/2015    Procedure: LASER CO2 VAGINA;  Surgeon: Mariela Abdalla MD;  Location: [facility]    LASER CO2 VAGINA N/A 05/24/2019    Procedure: Exam under anesthesia, vaginal biopsies, CO2 laser of the vagina;  Surgeon: Lilliam Roy MD;  Location: [facility]    LASER CO2 VAGINA N/A 06/08/2021    Procedure: Exam under anesthesia, laser ablation of the upper vagina;  Surgeon: Dany Perez MD;  Location: [facility]    PICC DOUBLE LUMEN PLACEMENT Left 11/28/2024    left basilic 5 fr dl power picc 44 cm    REPAIR BLADDER N/A 07/12/2024    Procedure: Repair bladder;  Surgeon: Dany Perez MD;  [facility]        MEDICATIONS:  PTA Meds  Prior to Admission medications    Medication Sig Last Dose Taking? Auth Provider Long Term End Date   acetaminophen (TYLENOL) 325 MG tablet Take 3 tablets (975 mg) by mouth every 8 hours. 3/4/2025 Yes Maci Chandler MD No    albuterol (PROAIR HFA/PROVENTIL HFA/VENTOLIN HFA) 108 (90 Base) MCG/ACT inhaler Inhale 1-2 puffs into the lungs every 4 hours as needed for shortness of breath  Yes Reported, Patient     calcium Citrate-vitamin D 500-400 MG-UNIT CHEW Take 1 tablet by mouth daily Unknown Yes Reported, Patient     cyanocobalamin (CYANOCOBALAMIN) 1000 MCG/ML injection Inject 1 mL (1,000 mcg) into a muscle every month. Unknown Yes Reported, Patient     diclofenac  "(VOLTAREN) 1 % topical gel Apply 4 g topically 4 times daily as needed for moderate pain. 3/3/2025 Yes Mar Chua MD     ferrous sulfate (CASSANDRA-IN-SOL) 75 (15 FE) MG/ML oral drops Take 15 mg by mouth daily Unknown Yes Reported, Patient     hydrocortisone 2.5 % cream Apply topically as needed  Yes Reported, Patient     lidocaine (XYLOCAINE) 5 % external ointment Apply 1 Application topically as needed  Yes Reported, Patient     magic mouthwash suspension, diphenhydrAMINE, lidocaine, aluminum-magnesium & simethicone, (FIRST-MOUTHWASH BLM) compounding kit Swish and spit 10 mLs in mouth 3 times daily (before meals). 3/4/2025 Yes Maci Chandler MD     melatonin 1 MG TABS tablet Take 1 tablet (1 mg) by mouth nightly as needed for sleep. 3/3/2025 Bedtime Yes Maci Chandler MD     morphine 0.5 % in solosite gel Apply 8-16 clicks (4-8 g) topically daily as needed for pain (with dressing changes).  Yes Kamilah De La Torre PA-C     naloxone (NARCAN) 4 MG/0.1ML nasal spray Spray 1 spray (4 mg) into one nostril alternating nostrils as needed for opioid reversal every 2-3 minutes until assistance arrives  Yes Gaurang Guajardo MD Yes    ondansetron (ZOFRAN) 8 MG tablet Take 1 tablet (8 mg) by mouth every 8 hours as needed for nausea  Yes Kelsey Mccracken APRN CNP     oxyCODONE IR (ROXICODONE) 20 MG TABS immediate release tablet Take 20 mg by mouth every 4 hours as needed for severe pain (Chronic Pain). 3/4/2025 Yes Maci Chandler MD No    phenol (CHLORASEPTIC) 1.4 % spray Take 1-2 sprays (1-2 mLs) by mouth every hour as needed for sore throat.  Yes Dany Perez MD     polyethylene glycol (MIRALAX) 17 GM/Dose powder Take 17 g by mouth daily as needed for constipation 3/3/2025 Yes Dany Perez MD     prochlorperazine (COMPAZINE) 5 MG tablet Take 1-2 tablets (5-10 mg) by mouth every 6 hours as needed for nausea or vomiting  Yes Kelsey Mccracken APRN CNP     Skin Protectants, Misc. (INTERDRY 10\"X36\") SHEE " Externally apply 10 each topically every 24 hours. Apply to skin folds on abdomen  Yes Dany Perez MD     triamcinolone (KENALOG) 0.1 % external ointment Apply topically 3 times daily as needed for irritation.  Yes Dany Perez MD        Current Meds  Current Facility-Administered Medications   Medication Dose Route Frequency Provider Last Rate Last Admin    acetaminophen (TYLENOL) tablet 975 mg  975 mg Oral Q8H Salvador Dickerson MD   975 mg at 03/13/25 0819    artificial saliva (BIOTENE MT) solution 1 spray  1 spray Mouth/Throat 4x Daily Salvador Dickerson MD   1 spray at 03/13/25 0833    [Held by provider] enoxaparin ANTICOAGULANT (LOVENOX) injection 40 mg  40 mg Subcutaneous Q24H Salvador Dickerson MD   40 mg at 03/13/25 0819    heparin lock flush 10 unit/mL injection 5-10 mL  5-10 mL Intracatheter Q24H Deepali Negrete MD        heparin lock flush 100 unit/mL injection 5-10 mL  5-10 mL Intracatheter Q28 Days Deepali Negrete MD        Lidocaine (LIDOCARE) 4 % Patch 1 patch  1 patch Transdermal Q24H Deepali Negrete MD   1 patch at 03/13/25 0825    magic mouthwash suspension (diphenhydramine, lidocaine, aluminum-magnesium & simethicone)  10 mL Swish & Spit TID AC Salvador Dickerson MD   10 mL at 03/12/25 1705    polyethylene glycol (MIRALAX) Packet 17 g  17 g Oral Daily Piter Webber MD   17 g at 03/13/25 0820    senna-docusate (SENOKOT-S/PERICOLACE) 8.6-50 MG per tablet 1 tablet  1 tablet Oral At Bedtime Piter Webber MD   1 tablet at 03/10/25 2150    sodium chloride (PF) 0.9% PF flush 10-20 mL  10-20 mL Intracatheter Q28 Days Deepali Negrete MD        sodium chloride (PF) 0.9% PF flush 3 mL  3 mL Intracatheter Q8H Salvador Dickerson MD   10 mL at 03/12/25 1510     Infusion Meds  Current Facility-Administered Medications   Medication Dose Route Frequency Provider Last Rate Last Admin       ALLERGIES:    Allergies   Allergen Reactions    Ibuprofen Nausea and Vomiting    Shrimp     Sulfa  Antibiotics Rash     Patient started on bactrim 7/24/24 tolerating medication without rash on 7/25        REVIEW OF SYSTEMS:  A comprehensive of systems was negative except as noted above.    SOCIAL HISTORY:   Social History     Socioeconomic History    Marital status: Single     Spouse name: Not on file    Number of children: Not on file    Years of education: Not on file    Highest education level: Not on file   Occupational History    Not on file   Tobacco Use    Smoking status: Never    Smokeless tobacco: Never   Substance and Sexual Activity    Alcohol use: Not Currently    Drug use: No    Sexual activity: Not on file   Other Topics Concern    Parent/sibling w/ CABG, MI or angioplasty before 65F 55M? Not Asked   Social History Narrative    Not on file     Social Drivers of Health     Financial Resource Strain: Unknown (3/12/2025)    Financial Resource Strain     Within the past 12 months, have you or your family members you live with been unable to get utilities (heat, electricity) when it was really needed?: Patient unable to answer   Food Insecurity: Unknown (3/12/2025)    Food Insecurity     Within the past 12 months, did you worry that your food would run out before you got money to buy more?: Patient unable to answer     Within the past 12 months, did the food you bought just not last and you didn t have money to get more?: Patient unable to answer   Transportation Needs: Unknown (3/12/2025)    Transportation Needs     Within the past 12 months, has lack of transportation kept you from medical appointments, getting your medicines, non-medical meetings or appointments, work, or from getting things that you need?: Patient unable to answer   Physical Activity: Not on file   Stress: Not on file   Social Connections: Not on file   Interpersonal Safety: Unknown (3/12/2025)    Interpersonal Safety     Do you feel physically and emotionally safe where you currently live?: Patient unable to answer     Within the  past 12 months, have you been hit, slapped, kicked or otherwise physically hurt by someone?: Patient unable to answer     Within the past 12 months, have you been humiliated or emotionally abused in other ways by your partner or ex-partner?: Patient unable to answer   Housing Stability: Unknown (3/12/2025)    Housing Stability     Do you have housing? : Patient unable to answer     Are you worried about losing your housing?: Patient unable to answer       FAMILY MEDICAL HISTORY:   Family History   Problem Relation Age of Onset    Heart Disease Father     Heart Failure Father     No Known Problems Brother     No Known Problems Brother     No Known Problems Brother     Pancreatitis Brother     Hypertension Sister     Peripheral Vascular Disease Sister     No Known Problems Sister     No Known Problems Son     No Known Problems Daughter        PHYSICAL EXAM:   Temp  Av  F (36.1  C)  Min: 93  F (33.9  C)  Max: 98.7  F (37.1  C)      Pulse  Av.7  Min: 52  Max: 111 Resp  Av.4  Min: 13  Max: 20  SpO2  Av.9 %  Min: 91 %  Max: 100 %       /78 (BP Location: Left arm, Cuff Size: Adult Small)   Pulse 90   Temp 96.7  F (35.9  C) (Temporal)   Resp 20   Wt 61.2 kg (134 lb 14.7 oz)   SpO2 99%   BMI 23.90 kg/m     Date 25 07 - 25 0659   Shift 9798-0063 7142-3817 5722-0417 24 Hour Total   INTAKE   P.O. 470   470   Colloid 500   500   Shift Total(mL/kg) 970(15.85)   970(15.85)   OUTPUT   Shift Total(mL/kg)       Weight (kg) 61.2 61.2 61.2 61.2      Admit Weight: 61.2 kg (134 lb 14.7 oz)     General Appearance: Ill-looking pt, cachectic.  Chest: Right chest port catheter in place.  Respiratory: On room air. Lungs are clear to auscultation bilaterally.    Cardiovascular: Regular rate and rhythm.    GI: Normal bowel sounds, Soft, nontender, nondistended.  Suprapubic catheter in place draining scant amount of urine.  Extremities: Bilateral lower extremity edema.   Neurology:  Moving all  extremities appropriately    LABS:   CMP  Recent Labs   Lab 03/13/25  0652 03/12/25  0534 03/11/25  0309 03/10/25  0529 03/09/25  0734    141 139 138 139   POTASSIUM 3.4 3.6 3.6 3.6 3.6   CHLORIDE 110* 110* 109* 109* 108*   CO2 22 22 22 22 19*   ANIONGAP 9 9 8 7 12   GLC 82 86 93 89 80   BUN 32.5* 31.7* 28.1* 27.2* 26.9*   CR 1.50* 1.51* 1.29* 1.27* 1.23*   GFRESTIMATED 37* 37* 44* 45* 47*   SAMUEL  --  8.2* 8.4* 8.3* 8.4*   MAG 2.2 2.2 2.1 2.1 2.2   PHOS 2.9 3.5 3.2 3.0 2.9   PROTTOTAL 4.6* 4.6* 4.6* 4.4* 4.5*   ALBUMIN 3.2* 2.3* 2.4* 2.3* 2.4*   BILITOTAL 0.5 0.5 0.5 0.4 0.4   ALKPHOS 114 167* 174* 153* 139   AST 19 24 28 34 46*   ALT 16 26 29 27 29     CBC  Recent Labs   Lab 03/13/25  0810 03/11/25  0507 03/11/25  0309 03/10/25  0529   HGB 5.6* 8.2* 7.9* 7.3*   WBC 5.8 8.1 7.5 6.9   RBC 1.83* 2.63* 2.58* 2.43*   HCT 17.3* 24.8* 23.5* 22.1*   MCV 95 94 91 91   MCH 30.6 31.2 30.6 30.0   MCHC 32.4 33.1 33.6 33.0   RDW 26.5* 25.4* 25.3* 24.6*   PLT 74* 148* 135* 118*     INRNo lab results found in last 7 days.  ABGNo lab results found in last 7 days.   URINE STUDIES  Recent Labs   Lab Test 03/11/25  1445 03/07/25  0832 03/04/25  2030 02/23/25  1728 07/21/24  1705 04/11/24  1152 05/23/23  0820 02/15/23  0927   COLOR Orange* Orange* Yellow Dark Brown*   < > Yellow   < > Yellow   APPEARANCE Cloudy* Cloudy* Slightly Cloudy* Cloudy*   < > Clear   < > Clear   URINEGLC Negative Negative Negative Negative   < > Negative   < > Negative   URINEBILI Negative Negative Negative Negative   < > Negative   < > Negative   URINEKETONE Negative Trace* Negative Negative   < > Negative   < > Negative   SG 1.020 1.026 1.014 1.021   < > 1.010   < > 1.015   UBLD Large* Large* Large* Large*   < > Moderate*   < > Large*   URINEPH 5.5 5.5 5.5 5.5   < > 7.0   < > 7.0   PROTEIN 200* 200* 50* 100*   < > Trace*   < > Negative   UROBILINOGEN  --   --   --   --   --  1.0  --  1.0   NITRITE Negative Negative Negative Negative   < > Positive*   < >  Positive*   LEUKEST Large* Large* Large* Large*   < > Large*   < > Small*   RBCU >182* 61* >182* >182*   < > 10-25*   < > 25-50*   WBCU >182* >182* >182* >182*   < > >100*   < > 10-25*    < > = values in this interval not displayed.     No lab results found.  PTH  No lab results found.  IRON STUDIES  Recent Labs   Lab Test 02/15/25  0836 09/24/24  1330   IRON 17* 22*   FEB 40* 100*   IRONSAT 43 22   CASSANDRA 270 450*       IMAGING:  The radiologist's report which is filed    Navneet Sims MD

## 2025-03-13 NOTE — PROGRESS NOTES
"[facility]    Medicine Progress Note - Hospitalist Service, GOLD TEAM 8    Date of Admission:  3/4/2025    Assessment & Plan   \"Aranza\" Alis Hartman is a 69 year old woman admitted on 2/5/2025. 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  Hospital course complicated by MADHU and hypervolemia, acute on chronic pain, difficult neurocognitive status, decreased oral intake and ongoing discussion of goals of care.        Today:   - Attempted care conference today with palliative and Oncology daughter was unable to make it on time.  Care conference will need to be scheduled.  - Dr. Perez (Oncology) was able to have a conversation with the daughter later and they will like full code, nutrition via feeding tube  - Ethics consulted, to assist with conversations of goals of care  - Repeat albumin 100 g  - 1 unit PRBC for low hemoglobin, repeat in the afternoon  - Check EKG  - Start buprenorphine for pain management and Zyprexa.  Continue as needed oxycodone and Dilaudid.  - XR feeding tube consulted due to Kiko-en-Y gastric bypass, cannot be placed at bedside  - Dietitian consulted for tube feeds      Acute on chronic anemia and thrombocytopenia  Hemoglobin 5.6 this morning, has been 7-8.  No obvious bleeding.  Likely due to malnutrition and acute illness.  - Daughter consented for blood on the phone as patient is unable to consent  - Give 1 unit PRBC, recheck hemoglobin in the evening.  - Prophylactic Lovenox discontinued    MADHU, oliguric, likely secondary to poor oral " intake/malnutrition  Anasarca  Creatinine at baseline 0.8-0.9 and admission. Trended up from 3/06, then peaked at 1.29 on 2/08, trended down and starting to trend up again 3/10.  Elevated urine protein creatinine ratio and urine albumin.UA 3/11: 200 albumin, large blood, large leukocyte Estrace, greater than 182 WBC, moderate bacteria, many yeast, hyaline casts.  Other differentials considered include deconditioning causing ischemic ATN, calcium oxalate nephropathy in the setting of gastric bypass surgery.  Renal US normal  Was on IV fluids, stopped 3/11  - Nephrology consulted.  Received 100 g of 25% albumin 03/12.  Repeat another dose 03/13.   -Holding on further glomerulonephritis workup as patient is not a good candidate for renal biopsy and immunosuppressants  - Lymphedema consulted  - Strict I's and O's, daily weights  - Suprapubic catheter in place  - Monitor on BMP  - Follow-up 3/11 urine culture    Delirium v/s Metabolic Encephalopathy  Schizophrenia   RAFAEL   Unspecified neurocognitive Disorder: MOCA 25/30 9/22/22   On admission patient's family was concerned about her confusion. Patient has paranoia with suspected hallucinations. Acute worsening was likely related to possible UTI v/s dehydration lack of PO. B12 FOLATE, ammonia all normal. Initially considered CT head but patient is unable to lie flat.  - Psychiatry consulted, saw patient 3/11, patient denied depression, hallucinations and psych could not elicit any paranoia symptoms.    -Zyprexa 2.5 mg twice daily started 01/13  - Delirium precautions  - Pain management per below.    Acute on Chronic Pain  Patient reporting thigh, low back pain related to her coccyx wound mentioned below.  Usually hunched over, screaming in the room.  - Oxycodone 10 mg q3h prn tablet or solution  - IV dilaudid 0.5 mg Q2H prn for breakthrough pain   - Lidocaine patch  - Buprenorphine patch started 3/13     Goals of care   Recurrent hospitalizations with progressive step-wise  decline  Severe Protein-Calorie Malnutrition   Advanced care planning Discussion  Sacral pressure ulcer  - 3/13: Planned care conference today with oncology, palliative care.  Called patient's daughter the day prior and the morning of to confirm time.  Patient's daughter did not show up until about 1 PM.  Care conference had to be rescheduled.  Oncology was able to connect with the familyafter showing up.  Please see their documentation for full conversation.  Subsequently patient's daughter elected to keep patient full code and will like enteric tube placed for nutrition.  Attempted to see daughter at bedside later, she had left the hospital.  I called her to discuss the nutrition component further with her.  We discussed that we will give the patient the opportunity to get nutrition through the tube for a maximum of 7 days and reevaluate. I also did inform her that if the patient refuses to have the tube placed or appears uncomfortable or pulls the tube we will not force her or replace the tube.  She expressed understanding.  I also did inform her that ethics is consulted to assist with conversations around her mother's care.  - 03/12: Extensive discussion with the patient's daughter regarding overall clinical condition and goals of care.  I did inform the daughter that her mother has been refusing all medications, food, cares, while inpatient.  Daughter expressed that she was worried that maybe her mom was paranoid.  I did inform her that she was seen by psychiatry who did not elicit any paranoia symptoms, however due to recommend treating schizophrenia with Zyprexa if family desires.  She had not made a decision about it.  She had questions about feeding tube, I did inform her that her mother is unable to lay down and is usually slumped over in a feeding tube may not be feasible, and having extra tube can make her delirium/mental health worse.  She expressed understanding, and is willing to come in 03/13 at 12  PM for [place] discussion of goals of care, CODE STATUS with the primary team and palliative care.  - Nutrition consulted, appreciate recommendations.  Continue calorie Counts ordered for 3/11-3/15 -so far has been charted as 0.  -XR feeding tube order placed given Kiko-en-Y gastric bypass, unable to be placed at the bedside.  -Dietitian consult for tube feeds  - WOC consulted    --------------------------------------------------------------   Afib   Rate controlled   Prior admission had risk/benefit discussion and discontinued AC due to vaginal bleeding.     Serous endometrial carcinoma  Follows w/ heme/onc through [facility]. S/p SNEHA, BSO 07/2024 s/p cycle 3 carbo/taxel 10/15/2024, cycle 4 delayed in s/o recent admission, family ultimately decided to forgo any further treatment.   During care conference, it was expressed that, her cancer is likely to recur due to it being an aggressive type but when it will recur is unknown. However, should it recur, GynOnc team expressed that, because of her functional status (significant weakness) and malnutrition, treatment would not be recommended since that would worsen her health overall and benefits would not outweigh the risks.    Pyuria   Hx of ESBL urosepsis and bacteremia  Bladder dysfunction s/p cystostomy and suprapubic catheter  Recently admitted 11/26 - 12/8/2024 for ESBL urosepsis and bacteremia requiring ICU w/ L nephrostomy tube (removed 1/7) treated w/ ertapenem, returned 1/25-1/27 for concern for UTI but no clear infection at that time.  ID was consulted that admission and recommended avoiding frequent UA and Ucx checks in future unless patient w/ symptoms of UTI. This admission increased confusion, poor appetite and increased generalized weakness, foul smelling urine. CT abdomen unremarkable. Not septic on presentation. U/A showed growing [id number] mixed elisa. U/A collected from exchanged kent in the ED 3/4. S/p unasyn (3/4-3/5). S/p zosyn (3/5-3/6).    -Continue suprapubic catheter               Diet: Combination Diet Regular Diet Adult  Snacks/Supplements Adult: Ensure Enlive; With Meals  Snacks/Supplements Adult: Boost Soothe; Between Meals  Calorie Counts    DVT Prophylaxis: None, acute hemoglobin and platelet drop.  Shahid Catheter: Not present  Lines: PRESENT      Port a Cath 02/05/25 Single Lumen Right Chest wall-Site Assessment: WDL      Cardiac Monitoring: None  Code Status: Full Code      Clinically Significant Risk Factors          # Hyperchloremia: Highest Cl = 110 mmol/L in last 2 days, will monitor as appropriate          # Hypoalbuminemia: Lowest albumin = 2.3 g/dL at 3/12/2025  5:34 AM, will monitor as appropriate    # Acute Kidney Injury, unspecified: based on a >150% or 0.3 mg/dL increase in last creatinine compared to past 90 day average, will monitor renal function  # Hypertension: Noted on problem list             # Severe Malnutrition: based on nutrition assessment and treatment provided per dietitian's recommendations.    # Financial/Environmental Concerns: none         Social Drivers of Health    Food Insecurity: Unknown (3/12/2025)    Food Insecurity     Within the past 12 months, did you worry that your food would run out before you got money to buy more?: Patient unable to answer     Within the past 12 months, did the food you bought just not last and you didn t have money to get more?: Patient unable to answer   Housing Stability: Unknown (3/12/2025)    Housing Stability     Do you have housing? : Patient unable to answer     Are you worried about losing your housing?: Patient unable to answer   Financial Resource Strain: Unknown (3/12/2025)    Financial Resource Strain     Within the past 12 months, have you or your family members you live with been unable to get utilities (heat, electricity) when it was really needed?: Patient unable to answer   Transportation Needs: Unknown (3/12/2025)    Transportation Needs     Within the past 12  months, has lack of transportation kept you from medical appointments, getting your medicines, non-medical meetings or appointments, work, or from getting things that you need?: Patient unable to answer   Interpersonal Safety: Unknown (3/12/2025)    Interpersonal Safety     Do you feel physically and emotionally safe where you currently live?: Patient unable to answer     Within the past 12 months, have you been hit, slapped, kicked or otherwise physically hurt by someone?: Patient unable to answer     Within the past 12 months, have you been humiliated or emotionally abused in other ways by your partner or ex-partner?: Patient unable to answer          Disposition Plan     Medically Ready for Discharge: Anticipated in 5+ Days             Deepali Negrete MD  Hospitalist Service, [address]  Securely message with [employer] (more info)  Text page via [facility] Paging/Directory   See signed in provider for up to date coverage information  ______________________________________________________________________    Interval History   No acute overnight events.    Physical Exam   Vital Signs:     BP: (!) 151/95       SpO2: 100 % O2 Device: None (Room air)    Weight: 134 lbs 14.74 oz    General Appearance:  Awake, Alert, Oriented to self and location, cachectic  Chest: Right chest port catheter in place  Respiratory: Breathing comfortably on room air. Lungs are clear to auscultation bilaterally.    Cardiovascular: Regular rate and rhythm, extremities perfused.    GI: Normal bowel sounds, Soft, nontender, nondistended.  Suprapubic catheter in place draining scant amount of urine  Extremities: Bilateral lower extremity edema   Neurology:  moving all extremities appropriately     Medical Decision Making       75 MINUTES SPENT BY ME on the date of service doing chart review, history, exam, documentation & further activities per the note.      Data   -------------------------  PAST 24 HR DATA REVIEWED -----------------------------------------------    I have personally reviewed the following data over the past 24 hrs:    5.8  \   5.6 (LL)   / 74 (L)     141 110 (H) 32.5 (H) /  82   3.4 22 1.50 (H) \     ALT: 16 AST: 19 AP: 114 TBILI: 0.5   ALB: 3.2 (L) TOT PROTEIN: 4.6 (L) LIPASE: N/A       Imaging results reviewed over the past 24 hrs:   No results found for this or any previous visit (from the past 24 hours).

## 2025-03-14 ENCOUNTER — APPOINTMENT (OUTPATIENT)
Dept: OCCUPATIONAL THERAPY | Facility: CLINIC | Age: 70
End: 2025-03-14
Payer: COMMERCIAL

## 2025-03-14 VITALS
RESPIRATION RATE: 20 BRPM | DIASTOLIC BLOOD PRESSURE: 74 MMHG | OXYGEN SATURATION: 93 % | HEART RATE: 73 BPM | BODY MASS INDEX: 23.9 KG/M2 | SYSTOLIC BLOOD PRESSURE: 112 MMHG | WEIGHT: 134.92 LBS | TEMPERATURE: 96.9 F

## 2025-03-14 LAB
ALBUMIN SERPL BCG-MCNC: 4.1 G/DL (ref 3.5–5.2)
ALP SERPL-CCNC: 146 U/L (ref 40–150)
ALT SERPL W P-5'-P-CCNC: 15 U/L (ref 0–50)
ANION GAP SERPL CALCULATED.3IONS-SCNC: 12 MMOL/L (ref 7–15)
AST SERPL W P-5'-P-CCNC: 26 U/L (ref 0–45)
BACTERIA BLD CULT: NO GROWTH
BASOPHILS # BLD AUTO: 0 10E3/UL (ref 0–0.2)
BASOPHILS NFR BLD AUTO: 0 %
BILIRUB SERPL-MCNC: 0.9 MG/DL
BUN SERPL-MCNC: 31.8 MG/DL (ref 8–23)
CALCIUM SERPL-MCNC: 9.2 MG/DL (ref 8.8–10.4)
CHLORIDE SERPL-SCNC: 110 MMOL/L (ref 98–107)
CREAT SERPL-MCNC: 1.39 MG/DL (ref 0.51–0.95)
CREAT UR-SCNC: 8394.4 UMOL/L
CREATINE 24H UR-MRATE: NORMAL MG/24H
CREATINE/CREAT UR-SRTO: 24 MMOL/MOL CRT
EGFRCR SERPLBLD CKD-EPI 2021: 40 ML/MIN/1.73M2
EOSINOPHIL # BLD AUTO: 0 10E3/UL (ref 0–0.7)
EOSINOPHIL NFR BLD AUTO: 0 %
ERYTHROCYTE [DISTWIDTH] IN BLOOD BY AUTOMATED COUNT: 23.9 % (ref 10–15)
FOLATE SERPL-MCNC: 13.8 NG/ML (ref 4.6–34.8)
GLUCOSE SERPL-MCNC: 75 MG/DL (ref 70–99)
HCO3 SERPL-SCNC: 21 MMOL/L (ref 22–29)
HCT VFR BLD AUTO: 21.8 % (ref 35–47)
HGB BLD-MCNC: 7.3 G/DL (ref 11.7–15.7)
IMM GRANULOCYTES # BLD: 0 10E3/UL
IMM GRANULOCYTES NFR BLD: 0 %
LYMPHOCYTES # BLD AUTO: 0.8 10E3/UL (ref 0.8–5.3)
LYMPHOCYTES NFR BLD AUTO: 11 %
MAGNESIUM SERPL-MCNC: 2.2 MG/DL (ref 1.7–2.3)
MCH RBC QN AUTO: 30.5 PG (ref 26.5–33)
MCHC RBC AUTO-ENTMCNC: 33.5 G/DL (ref 31.5–36.5)
MCV RBC AUTO: 91 FL (ref 78–100)
MONOCYTES # BLD AUTO: 0.4 10E3/UL (ref 0–1.3)
MONOCYTES NFR BLD AUTO: 5 %
NEUTROPHILS # BLD AUTO: 5.6 10E3/UL (ref 1.6–8.3)
NEUTROPHILS NFR BLD AUTO: 83 %
NRBC # BLD AUTO: 0 10E3/UL
NRBC BLD AUTO-RTO: 0 /100
PHOSPHATE SERPL-MCNC: 2.4 MG/DL (ref 2.5–4.5)
PLATELET # BLD AUTO: 66 10E3/UL (ref 150–450)
POTASSIUM SERPL-SCNC: 3.2 MMOL/L (ref 3.4–5.3)
POTASSIUM SERPL-SCNC: 5.5 MMOL/L (ref 3.4–5.3)
PROT SERPL-MCNC: 5.4 G/DL (ref 6.4–8.3)
RBC # BLD AUTO: 2.39 10E6/UL (ref 3.8–5.2)
SODIUM SERPL-SCNC: 143 MMOL/L (ref 135–145)
WBC # BLD AUTO: 6.7 10E3/UL (ref 4–11)

## 2025-03-14 PROCEDURE — 99233 SBSQ HOSP IP/OBS HIGH 50: CPT | Performed by: INTERNAL MEDICINE

## 2025-03-14 PROCEDURE — 83735 ASSAY OF MAGNESIUM: CPT

## 2025-03-14 PROCEDURE — 250N000011 HC RX IP 250 OP 636: Mod: JZ | Performed by: PHARMACIST

## 2025-03-14 PROCEDURE — 99233 SBSQ HOSP IP/OBS HIGH 50: CPT | Performed by: STUDENT IN AN ORGANIZED HEALTH CARE EDUCATION/TRAINING PROGRAM

## 2025-03-14 PROCEDURE — 97535 SELF CARE MNGMENT TRAINING: CPT | Mod: GO

## 2025-03-14 PROCEDURE — 82947 ASSAY GLUCOSE BLOOD QUANT: CPT

## 2025-03-14 PROCEDURE — 84100 ASSAY OF PHOSPHORUS: CPT

## 2025-03-14 PROCEDURE — 99233 SBSQ HOSP IP/OBS HIGH 50: CPT | Mod: GC | Performed by: INTERNAL MEDICINE

## 2025-03-14 PROCEDURE — 82746 ASSAY OF FOLIC ACID SERUM: CPT | Performed by: STUDENT IN AN ORGANIZED HEALTH CARE EDUCATION/TRAINING PROGRAM

## 2025-03-14 PROCEDURE — 120N000002 HC R&B MED SURG/OB UMMC

## 2025-03-14 PROCEDURE — 250N000013 HC RX MED GY IP 250 OP 250 PS 637: Performed by: INTERNAL MEDICINE

## 2025-03-14 PROCEDURE — 99418 PROLNG IP/OBS E/M EA 15 MIN: CPT | Performed by: INTERNAL MEDICINE

## 2025-03-14 PROCEDURE — 250N000013 HC RX MED GY IP 250 OP 250 PS 637: Performed by: STUDENT IN AN ORGANIZED HEALTH CARE EDUCATION/TRAINING PROGRAM

## 2025-03-14 PROCEDURE — 250N000009 HC RX 250: Performed by: STUDENT IN AN ORGANIZED HEALTH CARE EDUCATION/TRAINING PROGRAM

## 2025-03-14 PROCEDURE — 85004 AUTOMATED DIFF WBC COUNT: CPT

## 2025-03-14 PROCEDURE — 99418 PROLNG IP/OBS E/M EA 15 MIN: CPT | Performed by: STUDENT IN AN ORGANIZED HEALTH CARE EDUCATION/TRAINING PROGRAM

## 2025-03-14 PROCEDURE — 97535 SELF CARE MNGMENT TRAINING: CPT | Mod: GO | Performed by: STUDENT IN AN ORGANIZED HEALTH CARE EDUCATION/TRAINING PROGRAM

## 2025-03-14 PROCEDURE — 84132 ASSAY OF SERUM POTASSIUM: CPT | Performed by: STUDENT IN AN ORGANIZED HEALTH CARE EDUCATION/TRAINING PROGRAM

## 2025-03-14 PROCEDURE — 250N000011 HC RX IP 250 OP 636: Performed by: STUDENT IN AN ORGANIZED HEALTH CARE EDUCATION/TRAINING PROGRAM

## 2025-03-14 PROCEDURE — 258N000003 HC RX IP 258 OP 636: Performed by: STUDENT IN AN ORGANIZED HEALTH CARE EDUCATION/TRAINING PROGRAM

## 2025-03-14 RX ORDER — LIDOCAINE HYDROCHLORIDE 20 MG/ML
5 SOLUTION OROPHARYNGEAL ONCE
Status: COMPLETED | OUTPATIENT
Start: 2025-03-14 | End: 2025-03-14

## 2025-03-14 RX ORDER — POTASSIUM CHLORIDE 29.8 MG/ML
20 INJECTION INTRAVENOUS
Status: COMPLETED | OUTPATIENT
Start: 2025-03-14 | End: 2025-03-14

## 2025-03-14 RX ADMIN — ACETAMINOPHEN 975 MG: 325 TABLET, FILM COATED ORAL at 09:02

## 2025-03-14 RX ADMIN — THIAMINE HCL TAB 100 MG 100 MG: 100 TAB at 09:02

## 2025-03-14 RX ADMIN — HYDROMORPHONE HYDROCHLORIDE 0.5 MG: 1 INJECTION, SOLUTION INTRAMUSCULAR; INTRAVENOUS; SUBCUTANEOUS at 19:48

## 2025-03-14 RX ADMIN — HYDROMORPHONE HYDROCHLORIDE 0.5 MG: 1 INJECTION, SOLUTION INTRAMUSCULAR; INTRAVENOUS; SUBCUTANEOUS at 16:53

## 2025-03-14 RX ADMIN — DIPHENHYDRAMINE HYDROCHLORIDE AND LIDOCAINE HYDROCHLORIDE AND ALUMINUM HYDROXIDE AND MAGNESIUM HYDRO 10 ML: KIT at 09:03

## 2025-03-14 RX ADMIN — HYDROMORPHONE HYDROCHLORIDE 0.5 MG: 1 INJECTION, SOLUTION INTRAMUSCULAR; INTRAVENOUS; SUBCUTANEOUS at 13:56

## 2025-03-14 RX ADMIN — Medication 1 SPRAY: at 11:07

## 2025-03-14 RX ADMIN — HYDROMORPHONE HYDROCHLORIDE 0.5 MG: 1 INJECTION, SOLUTION INTRAMUSCULAR; INTRAVENOUS; SUBCUTANEOUS at 09:00

## 2025-03-14 RX ADMIN — POTASSIUM CHLORIDE 20 MEQ: 29.8 INJECTION, SOLUTION INTRAVENOUS at 17:03

## 2025-03-14 RX ADMIN — Medication 2.5 MG: at 19:48

## 2025-03-14 RX ADMIN — POTASSIUM PHOSPHATE, MONOBASIC POTASSIUM PHOSPHATE, DIBASIC 9 MMOL: 224; 236 INJECTION, SOLUTION, CONCENTRATE INTRAVENOUS at 19:38

## 2025-03-14 RX ADMIN — Medication 1 SPRAY: at 09:03

## 2025-03-14 RX ADMIN — HYDROMORPHONE HYDROCHLORIDE 0.5 MG: 1 INJECTION, SOLUTION INTRAMUSCULAR; INTRAVENOUS; SUBCUTANEOUS at 05:11

## 2025-03-14 RX ADMIN — POTASSIUM CHLORIDE 20 MEQ: 29.8 INJECTION, SOLUTION INTRAVENOUS at 16:53

## 2025-03-14 RX ADMIN — Medication 2.5 MG: at 09:02

## 2025-03-14 RX ADMIN — Medication 1 SPRAY: at 19:48

## 2025-03-14 RX ADMIN — DIPHENHYDRAMINE HYDROCHLORIDE AND LIDOCAINE HYDROCHLORIDE AND ALUMINUM HYDROXIDE AND MAGNESIUM HYDRO 10 ML: KIT at 11:07

## 2025-03-14 RX ADMIN — HYDROMORPHONE HYDROCHLORIDE 0.5 MG: 1 INJECTION, SOLUTION INTRAMUSCULAR; INTRAVENOUS; SUBCUTANEOUS at 11:03

## 2025-03-14 ASSESSMENT — ACTIVITIES OF DAILY LIVING (ADL)
ADLS_ACUITY_SCORE: 94

## 2025-03-14 NOTE — PROGRESS NOTES
Care Management Follow Up    Length of Stay (days): 10    Expected Discharge Date: 03/17/2025     Concerns to be Addressed:       Patient plan of care discussed at interdisciplinary rounds: Yes    Anticipated Discharge Disposition: TBD      Anticipated Discharge Services:  TBD   Anticipated Discharge DME:  TBD    Patient/family educated on Medicare website which has current facility and service quality ratings:  n/a   Education Provided on the Discharge Plan:  n/a   Patient/Family in Agreement with the Plan:  n/a     Referrals Placed by CM/SW:    Private pay costs discussed: Not applicable    Discussed  Partnership in Safe Discharge Planning  document with patient/family: No     Handoff Completed: No, handoff not indicated or clinically appropriate    Additional Information:  RNCC notified care conference was not held as family was not availble.     MD spoke Pt's daughter and tube feeds via NG was agreed upon and SPL will re evaluate in 5 days..    Care management will continue to follow and support safe discharge planning as needed.     Next Steps: follow for dispo needs     LUÍS Licea  Care Management Department  Covering 5A: [id number] & 5C: [id number] (non-BMT)   Phone: [phone]  Teams  Vocera: weekdays 8:00 am - 4:30 pm.   See Vocera Care Team for off-day coverage

## 2025-03-14 NOTE — PROGRESS NOTES
PALLIATIVE CARE PROGRESS NOTE  [facility]     Patient Name: Alis Hartman  Date of Admission: 3/4/2025   Today the patient was seen for: goals of care     Recommendations & Counseling         Addendum:  Was not able to tolerate NT tube placement due to confusion and not able to lay flat. Discussed case again with medicine and Dr. Perez from gyn onc and then called daughter Joy with medicine attending Dr. Negrete. We informed Joy that Aranza could not tolerate the NG tube placement and we will not be recommending to try again unless her mental status improves and she can agree to do it. In the meantime made plan to continue with treatments for her mental health and pain (olanzapine and butrans patch both just started yesterday) and continue with a time-limited trial of better symptomatic treatment (but now without tube feeds) to see if that improves her mental status and then reassess in 1 week. We made tentative plan to have a care conference either Wednesday or Thursday next week or sooner if Aranza gets sicker. Joy again reiterated full code and restorative goals at this time. Anticipate further goals discussions after we give her more time to assess for responses to treatments.     Total time spent today was 85 minutes regarding pain, agitation management and goals of care on the date of the encounter including coordination of care with multiple teams and coordinating call to family for goals discussion. These recommendations were given to the primary team via face to face discussion.          GOALS OF CARE:   Per discussions with providers yesterday, plan for 1 week time limited trial of feeding tube while treating agitation and pain symptoms with goal to see if these treatments lead to improved mentation and participation in cares that would enable a life prolonging treatment pathway. We should plan to have a care conference at end of next week  "to reassess progress vs sooner if she is having further setbacks or if she refused or doesn't tolerate feeding tube. Ideally care conference would be in person with daughter Joy and with other family members who Joy has been consulting with so that everyone involved can hear the same thing.     If patient has no improvement in mental status and overall participation in her cares with above time-limited trial treatment plan, then we would still recommend at that point to transition to comfort care and hospice given concerns that her overall quality of life and ability to sustain life are limited by her ongoing AMS (which appears to be a likely dementia picture plus overlay of chronic mental health concerns) with minimal PO intake, significant functional decline and failure to thrive.  I would not recommend long term feeding tube in this case given that long term feeding has not been show to help either morbidity or mortality in patients with severe dementia which is how she is clinically behaving.     Agree that it will likely be helpful to have ethics aware of this in case, given family wishes her to be full code against medical advice and anticipated decision making challenges after this time-limited treatment trial.         ADVANCE CARE PLANNING:  Previously completed HCD naming Aranza (daughter) as HCA, appears this was not dated and deemed \"invalid.\" Aranza confirmed during last admission that Joy is her surrogate decision maker per notes in chart.   There is a POLST form on file, this was reviewed and current.  Code status: Full Code     MEDICAL MANAGEMENT:   #Pain, chronic, low back and abdominal after surgery in July. On chronic prn opioids in varying doses x >10 years. Was down to 5mg oxycodone q 6 hours, but in recent month dose increased up to 20mg q 6 hours prn during last hospitalization.    #Pain, L thigh  #Pain, sacral/coccyx wound and B arm and leg wounds  Remains hunched over in bed today, " "agitated, confused and in pain but is unable to elaborate further   butrans patch 10mcg/hr started 3/13  will take 3 days to reach full effect.   Can be increased further. If over time not effective would consider rotation to fentanyl patch or methadone  Acetaminophen 975mg q8hr  Oxycodone to 10mg q3hr prn  Hydromorphone to 0.5mg q2 hr prn for pain not controlled with PO meds.   Encourage bowel regimen     # agitation and confusion:  olanzapine 2.5mg bid started 3/13 per psychiatry recommendations  Today she seemed calmer but more tired. If over time we find the olanzapine is too sedating could consider re-consult psychiatry for further recommendations--? Would risperidone be less sedating for her and if so what starting dose would be recommended?        PSYCHOSOCIAL/SPIRITUAL:  Family: Daughter (Joy) and grandchildren, son (Hemanth), niece (Armida)  Joy mentions things being \"God's plan\", though specific [employer] needs/affiliations not addressed     Palliative Care will continue to follow. Thank you for the consult and allowing us to aid in the care of Alis Hartman.    These recommendations have been discussed with primary team.     Ritika Franco MD / Palliative Medicine   Securely message with the Vocera Web Console (learn more here)   Text page via [employer] Paging/Directory        Assessment          Alis Hartman is a 69 year old female with a past medical history of cervical cancer s/p radiation, seriuos adenocarcinoma s/p SNEHA/BSO and cystotomy w/ suprapubic catheter placement 07/2024 and 3 cycles of carbo/taxol (10/2024), hx ESBL urosepsis and bacteremia, RNY gastric bypass, afib on apixaban, HTN, CKD stage 3, and multiple recent admissions (most recently 2/5-2/25 with MADHU, deconditioning, and malnutrition). She is now admitted with concern for UTI and failure to thrive/general weakness.      The patient's daughter Joy met with Dr. Perez of gyn-onc and goals of care were discussed. She " "discussed hospice and palliative care has been consulted to continue goals of care discussion, including discussion of hospice and pain management.      We had a care conference on 3/7 and again on 3/10. Spoke with Joy again on 3/11.  Daughter (Joy) expresses that she is very overwhelmed by her mother's condition and the possibility of her dying. Goals are currently restorative though we are continuing to discuss options with Joy, including hospice.     Repeat care conference planned for 12pm 3/13- daughter did not show up until 1pm and providers needed to reschedule meeting but gyn/onc provider Dr. Perez was able to speak with Joy and made plan for time-limited trial of feeding tube and treatments for pain and agitation.      Today, the patient was seen for:  Goal of care  Family support  Symptom management  Palliative encounter      Interval History:     Multidisciplinary collaboration:  Started butrans patch and olanzapine yesterday      Notable medications:  Acetaminophen 975 mg q8h (refusing most doses)  Hydromorphone IV PRN- 1 mg over last 24 hours  Lidocaine patch (refused today)  Magic mouthwash scheduled- not using  Miralax daily- not using  Senna at bedtime - did not take yesterday  Oxycodone PRN - 10mg (1 dose) yesterday, x1 dose today      Patient/family narrative  Saw today, hunched over in bed. Not calling out and said she felt \"ok\" . Could not tell us where she was other than \"[place]\" and  would not answer other questions and would seem to fall asleep vs stop interacting.            Physical Exam:   Temp:  [96.6  F (35.9  C)-97  F (36.1  C)] 96.9  F (36.1  C)  Pulse:  [66-76] 73  Resp:  [20] 20  BP: ()/(59-84) 103/84  MAP:  [83 mmHg] 83 mmHg  SpO2:  [76 %-100 %] 93 %  134 lbs 14.74 oz    Gen: Sitting in bed hunched forward, chronically ill appearing  Neuro: wakes easily but minimally interactive, does not know where she is and does not answer other orientation questions.  "    Pulm: nonlabored breathing, comfortable on room air, no cough  CV: RRR by peripheral pulse, noncyanotic   ABD: Unable to examine  MSK:  BLE edema  Skin: Warm, dry, no rashes or abrasions on exposed skin. Chronic wounds not exposed        Data Reviewed:     Results for orders placed or performed during the hospital encounter of 03/04/25 (from the past 24 hours)   EKG 12-lead, complete   Result Value Ref Range    Systolic Blood Pressure  mmHg    Diastolic Blood Pressure  mmHg    Ventricular Rate 71 BPM    Atrial Rate 71 BPM    WV Interval 150 ms    QRS Duration 64 ms     ms    QTc 454 ms    P Axis 65 degrees    R AXIS 79 degrees    T Axis 68 degrees    Interpretation ECG       Sinus rhythm  Anteroseptal infarct (cited on or before 21-Jul-2024)  Abnormal ECG  When compared with ECG of 30-Nov-2024 13:23,  QRS duration has decreased  Questionable change in initial forces of Anterior leads  ST depression has replaced ST elevation in Anterior leads  Nonspecific T wave abnormality now evident in Anterolateral leads  Confirmed by MD GE, JULEE ([id number]) on 3/13/2025 10:21:02 PM     Hemoglobin   Result Value Ref Range    Hemoglobin 7.3 (L) 11.7 - 15.7 g/dL   CBC with Platelets & Differential    Narrative    The following orders were created for panel order CBC with Platelets & Differential.  Procedure                               Abnormality         Status                     ---------                               -----------         ------                     CBC with platelets and ...[[id number]]  Abnormal            Final result                 Please view results for these tests on the individual orders.   Comprehensive metabolic panel   Result Value Ref Range    Sodium 143 135 - 145 mmol/L    Potassium 3.2 (L) 3.4 - 5.3 mmol/L    Carbon Dioxide (CO2) 21 (L) 22 - 29 mmol/L    Anion Gap 12 7 - 15 mmol/L    Urea Nitrogen 31.8 (H) 8.0 - 23.0 mg/dL    Creatinine 1.39 (H) 0.51 - 0.95 mg/dL    GFR Estimate 40 (L)  >60 mL/min/1.73m2    Calcium 9.2 8.8 - 10.4 mg/dL    Chloride 110 (H) 98 - 107 mmol/L    Glucose 75 70 - 99 mg/dL    Alkaline Phosphatase 146 40 - 150 U/L    AST 26 0 - 45 U/L    ALT 15 0 - 50 U/L    Protein Total 5.4 (L) 6.4 - 8.3 g/dL    Albumin 4.1 3.5 - 5.2 g/dL    Bilirubin Total 0.9 <=1.2 mg/dL   Magnesium   Result Value Ref Range    Magnesium 2.2 1.7 - 2.3 mg/dL   Phosphorus   Result Value Ref Range    Phosphorus 2.4 (L) 2.5 - 4.5 mg/dL   CBC with platelets and differential   Result Value Ref Range    WBC Count 6.7 4.0 - 11.0 10e3/uL    RBC Count 2.39 (L) 3.80 - 5.20 10e6/uL    Hemoglobin 7.3 (L) 11.7 - 15.7 g/dL    Hematocrit 21.8 (L) 35.0 - 47.0 %    MCV 91 78 - 100 fL    MCH 30.5 26.5 - 33.0 pg    MCHC 33.5 31.5 - 36.5 g/dL    RDW 23.9 (H) 10.0 - 15.0 %    Platelet Count 66 (L) 150 - 450 10e3/uL    % Neutrophils 83 %    % Lymphocytes 11 %    % Monocytes 5 %    % Eosinophils 0 %    % Basophils 0 %    % Immature Granulocytes 0 %    NRBCs per 100 WBC 0 <1 /100    Absolute Neutrophils 5.6 1.6 - 8.3 10e3/uL    Absolute Lymphocytes 0.8 0.8 - 5.3 10e3/uL    Absolute Monocytes 0.4 0.0 - 1.3 10e3/uL    Absolute Eosinophils 0.0 0.0 - 0.7 10e3/uL    Absolute Basophils 0.0 0.0 - 0.2 10e3/uL    Absolute Immature Granulocytes 0.0 <=0.4 10e3/uL    Absolute NRBCs 0.0 10e3/uL     Recent Labs   Lab 03/14/25  0521 03/13/25  1722 03/13/25  0810 03/13/25  0652 03/12/25  0534 03/11/25  0507   WBC 6.7  --  5.8  --   --  8.1   HGB 7.3* 7.3* 5.6*  --   --  8.2*   MCV 91  --  95  --   --  94   PLT 66*  --  74*  --   --  148*     --   --  141 141  --    POTASSIUM 3.2*  --   --  3.4 3.6  --    CHLORIDE 110*  --   --  110* 110*  --    CO2 21*  --   --  22 22  --    BUN 31.8*  --   --  32.5* 31.7*  --    CR 1.39*  --   --  1.50* 1.51*  --    ANIONGAP 12  --   --  9 9  --    SAMUEL 9.2  --   --  8.7* 8.2*  --    GLC 75  --   --  82 86  --    ALBUMIN 4.1  --   --  3.2* 2.3*  --    PROTTOTAL 5.4*  --   --  4.6* 4.6*  --    BILITOTAL  0.9  --   --  0.5 0.5  --    ALKPHOS 146  --   --  114 167*  --    ALT 15  --   --  16 26  --    AST 26  --   --  19 24  --        No results found for this or any previous visit (from the past 24 hours).      Medical Decision Making       MANAGEMENT DISCUSSED with the following over the past 24 hours: medicine team, Dr. Zamarripa from gyn/onc   NOTE(S)/MEDICAL RECORDS REVIEWED over the past 24 hours: medicine and nursing notes, MAR review  Tests REVIEWED in the past 24 hours:  - CMP  - CBC

## 2025-03-14 NOTE — PLAN OF CARE
Goal Outcome Evaluation:      Plan of Care Reviewed With: patient    Overall Patient Progress: no changeOverall Patient Progress: no change     Shift: [id number]    VS: Pt refused VS consistantly, BP able to be measured x1 w mild hypotension  Neuro: A&O only to self, refusing/unable to answer most questions (orientation and assessment). Illogical speech. 4/5 UE strengths, 2/5 LE strengths.   Cardiac: WDL radial pulses   Resp: WDL on RA  GI/: x1 small incontinent BM this shift. Voiding w small UOP via suprapubic, pt refused to allow staff to perform suprapubic cares  Diet/Appetite: Reg diet, very poor tolerance refusing food most of shift despite offering various foods.  Activity: Bedbound. Pt unable to sit up without assistance dt stiffness, remains bend over straightened legs. Pt repositioned as often as allowed by pt. Rolled pt to check for incontinence as often as allowed, and utilized lift w poor tolerance by pt in order to obtain bed weight.   Pain: Pt refused/unable to rate pain, rFLACC and other pain scales utilized to measure, pt consistently restless, crying out, and talking about pain. IV dilaudid given w moderate relief to treat.  Skin: Sacral pressure injury dressing monitored, remaining CDI this shift, w some skin tears on perineal area on the pt's bottom. Perineum cleased gently upon discovery and monitored as often as pt allowed. BLEs compression stockings in place w good tolerance by pt.   LDA's: x1 Chest port WDL dressing CDI, infusing electrolyte replacements at time of writing.   Labs: hemoglobin remains 7.3 after blood was given yesterday,  Potassium drawn in AM w level of 3.2 replaced later in shift after weight obtained.     New changes this shift: NG feeding tube placement unable to be done dt pt intolerance of procedure. Potassium replaced this shift w phos to be replaced by oncoming RN, electrolyte protocol ordered later in shift dt difficulties w safely and tolerably measuring pt weight,  notified provider w each setback until weight was obtained in afternoon. Pt restless at times dt pain/discomfort, threatening violence during some ADLs and refusing other cares, ADLs, and meds throughout shift. Education consistently provided and alternate techniques used to attempt to persuade w some successes.

## 2025-03-14 NOTE — PROGRESS NOTES
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 Hospital stay complicated with MADHU and increased LE swelling and anasarca.    ##MADHU:  --Multifactorial; likely 2/2 poor oral intake, FTT, and deconditioing causing ischemic ATN.  --Pt did not respond to IV fluids and started to have 3rd spacing, worsening LE edema.  --Renal imaging ruled out obstruction and hydronephrosis, other possible causes is calcium oxalate nephropathy as pt has hx of gastric bypass surgery.  --UA showed large blood, LE +ve, WBC >182, RBC >182, Moderate bacteria, yeast +ve, hyaline cast +ve and urine specific gravity 1.02.  --Urine Na 25, indicating hypovolemia.  --Cr improving 1.39 03/14/2025 with Albumin.  --Recommended supporting intravascular volume by giving Albumin 100g 25% once again today and monitoring kidney function, albumin level is 4 03/14/2025.  --Last TTE back in 11/2024 had EF 60-65%, however given the worsening LE edema post IV fluids may consider repeating it if no improvement, would also consider giving Lasix if no improvement.  --Agree with strict calorie count per primary team, and try to encourage oral intake as tolerated.  --Will hold on further GN workup as pt is not a good candidate for renal biopsy and immunosuppressants.  --Strict I&Os.  --Adjust all meds  to kidney function.  --Avoid Nephrotoxins.    ##Anemia:  Hb 7.3.  Please repeat Iron studies.    ##Electrolytes: Na 143, K 3.2, Cl 110, HCO3 21, Mg 2.2, Ca 9.2. Can replete K as needed.  ##Hypokalemia; pt ahs had 2 undocumented stools, likely causing it with low HCO3 as well, encourage oral intake as able, pendign feeding tube placement.      Recommendations were communicated to primary team via Vocera and Note.    Seen and discussed with Dr. Alston.    Navneet Sims MD  Division of Renal Disease and Hypertension  [facility]  [email or web address] Web Console        REASON FOR CONSULT: MADHU    Interval Events:  Pt is still refusing to eat/drink or taking meds; 68 calorie count yesterday.  Plan for care conference with family to discuss goals of care today or tomorrow.  Pt is still oliguric -->  ml yesterday.  Bedside feeding tube was unable to be placed, plan to do XR feeding tube today.    PAST MEDICAL HISTORY:  Reviewed with patient on 03/14/2025     Past Medical History:   Diagnosis Date    Atrial fibrillation (H)     Depression     Hypertension     Malignant neoplasm of endocervix (H)     Tx with radiation    Near syncope 11/7/2024    Orthopnea 9/21/2024    Other chronic pain     Low back    Schizophrenia (H)     Urinary incontinence        Past Surgical History:   Procedure Laterality Date    ABDOMINOPLASTY      BIOPSY CERVICAL, LOCAL EXCISION, SINGLE/MULTIPLE  10/26/2011    Procedure:BIOPSY CERVICAL, LOCAL EXCISION, SINGLE/MULTIPLE; EUA, cervical biopsies; Surgeon:BETTY TIENO; Location:[facility] OR    BIOPSY VAGINAL N/A 06/08/2021    Procedure: BIOPSY, VAGINA;  Surgeon: Dany Perez MD;  Location: [facility]    CYSTOSCOPY N/A 05/17/2024    Procedure: Cystoscopy;  Surgeon: Dany Perez MD;  [facility]    CYSTOSTOMY, INSERT TUBE SUPRAPUBIC, COMBINED  07/12/2024    Procedure: Insertion of supra-pubic catheter;  Surgeon: Dany Perez MD;  [facility]    DILATION AND CURETTAGE, WITH ULTRASOUND GUIDANCE  N/A 05/17/2024    Procedure: Dilation and curettage of the uterus with drainage of uterine fluid under ultrasound guidance, lysis of vaginal adhesions;  Surgeon: Dany Perez MD;  [facility]    EXAM UNDER ANESTHESIA PELVIC N/A 03/12/2020    Procedure: Exam under anesthsia, vaginal biopsies, possible CO2 laser of the vagina;  Surgeon: Lilliam Roy MD;  [facility]    GI SURGERY  2004    gastric bypass    HYSTERECTOMY TOTAL ABDOMINAL, BILATERAL SALPINGO-OOPHORECTOMY, COMBINED Bilateral 07/12/2024    Procedure: Diagnostic laparoscopy converted to exploratory laparotomy, total abdominal hysterectomy, bilateral salpingo-oophorectomy, lysis of adhesions >60 min;  Surgeon: Dany Perez MD;  [facility]    INSERT PORT VASCULAR ACCESS N/A 08/26/2024    Procedure: Insert port vascular access;  Surgeon: Avery Gregory MD;  [facility]    IR CHEST PORT PLACEMENT > 5 YRS OF AGE  08/26/2024    IR FOLLOW UP VISIT OUTPATIENT  1/7/2025    IR NEPHROSTOMY TUBE PLACEMENT LEFT  11/29/2024    LASER CO2 VAGINA N/A 03/02/2015    Procedure: LASER CO2 VAGINA;  Surgeon: Mariela Abdalla MD;  Location: [facility]    LASER CO2 VAGINA N/A 05/24/2019    Procedure: Exam under anesthesia, vaginal biopsies, CO2 laser of the vagina;  Surgeon: Lilliam Roy MD;  Location: [facility]    LASER CO2 VAGINA N/A 06/08/2021    Procedure: Exam under anesthesia, laser ablation of the upper vagina;  Surgeon: Dany Perez MD;  Location: [facility]    PICC DOUBLE LUMEN PLACEMENT Left 11/28/2024    left basilic 5 fr dl power picc 44 cm    REPAIR BLADDER N/A 07/12/2024    Procedure: Repair bladder;  Surgeon: Dany Perez MD;  [facility]        MEDICATIONS:  PTA Meds  Prior to Admission medications    Medication Sig Last Dose Taking? Auth Provider Long Term End Date   acetaminophen (TYLENOL) 325 MG tablet Take 3 tablets (975 mg) by mouth every 8 hours. 3/4/2025 Yes Maci Chandler MD No    albuterol (PROAIR  HFA/PROVENTIL HFA/VENTOLIN HFA) 108 (90 Base) MCG/ACT inhaler Inhale 1-2 puffs into the lungs every 4 hours as needed for shortness of breath  Yes Reported, Patient     calcium Citrate-vitamin D 500-400 MG-UNIT CHEW Take 1 tablet by mouth daily Unknown Yes Reported, Patient     cyanocobalamin (CYANOCOBALAMIN) 1000 MCG/ML injection Inject 1 mL (1,000 mcg) into a muscle every month. Unknown Yes Reported, Patient     diclofenac (VOLTAREN) 1 % topical gel Apply 4 g topically 4 times daily as needed for moderate pain. 3/3/2025 Yes Mar Chua MD     ferrous sulfate (CASSANDRA-IN-SOL) 75 (15 FE) MG/ML oral drops Take 15 mg by mouth daily Unknown Yes Reported, Patient     hydrocortisone 2.5 % cream Apply topically as needed  Yes Reported, Patient     lidocaine (XYLOCAINE) 5 % external ointment Apply 1 Application topically as needed  Yes Reported, Patient     magic mouthwash suspension, diphenhydrAMINE, lidocaine, aluminum-magnesium & simethicone, (FIRST-MOUTHWASH BLM) compounding kit Swish and spit 10 mLs in mouth 3 times daily (before meals). 3/4/2025 Yes Maci Chandler MD     melatonin 1 MG TABS tablet Take 1 tablet (1 mg) by mouth nightly as needed for sleep. 3/3/2025 Bedtime Yes Maci Chandler MD     morphine 0.5 % in solosite gel Apply 8-16 clicks (4-8 g) topically daily as needed for pain (with dressing changes).  Yes Kamilah De La Torre PA-C     naloxone (NARCAN) 4 MG/0.1ML nasal spray Spray 1 spray (4 mg) into one nostril alternating nostrils as needed for opioid reversal every 2-3 minutes until assistance arrives  Yes Gaurang Guajardo MD Yes    ondansetron (ZOFRAN) 8 MG tablet Take 1 tablet (8 mg) by mouth every 8 hours as needed for nausea  Yes Kelsey Mccracken APRN CNP     oxyCODONE IR (ROXICODONE) 20 MG TABS immediate release tablet Take 20 mg by mouth every 4 hours as needed for severe pain (Chronic Pain). 3/4/2025 Yes Maci Chandler MD No    phenol (CHLORASEPTIC) 1.4 % spray Take 1-2 sprays (1-2 mLs)  "by mouth every hour as needed for sore throat.  Yes Dany Perez MD     polyethylene glycol (MIRALAX) 17 GM/Dose powder Take 17 g by mouth daily as needed for constipation 3/3/2025 Yes Dany Perez MD     prochlorperazine (COMPAZINE) 5 MG tablet Take 1-2 tablets (5-10 mg) by mouth every 6 hours as needed for nausea or vomiting  Yes Kelsey Mccracken APRN CNP     Skin Protectants, Misc. (INTERDRY 10\"X36\") SHEE Externally apply 10 each topically every 24 hours. Apply to skin folds on abdomen  Yes Dany Perez MD     triamcinolone (KENALOG) 0.1 % external ointment Apply topically 3 times daily as needed for irritation.  Yes Dany Perez MD        Current Meds  Current Facility-Administered Medications   Medication Dose Route Frequency Provider Last Rate Last Admin    acetaminophen (TYLENOL) tablet 975 mg  975 mg Oral Q8H Salvador Dickerson MD   975 mg at 03/14/25 0902    artificial saliva (BIOTENE MT) solution 1 spray  1 spray Mouth/Throat 4x Daily Salvador Dickerson MD   1 spray at 03/14/25 0903    barium sulfate (VARIBAR THIN Liquid) 40 % oral suspension 12 g  30 mL Oral Once Brenda Guardado,         buprenorphine (BUTRANS) 10 MCG/HR WK patch 1 patch  1 patch Transdermal Weekly Deepali Negrete MD   1 patch at 03/13/25 1303    buprenorphine (BUTRANS) Patch in Place   Transdermal Q8H Deepali Negrete MD        [Held by provider] enoxaparin ANTICOAGULANT (LOVENOX) injection 40 mg  40 mg Subcutaneous Q24H Salvador Dickerson MD   40 mg at 03/13/25 0819    heparin lock flush 10 unit/mL injection 5-10 mL  5-10 mL Intracatheter Q24H Deepali Negrete MD   5 mL at 03/13/25 1428    heparin lock flush 100 unit/mL injection 5-10 mL  5-10 mL Intracatheter Q28 Days Deepali Negrete MD   5 mL at 03/13/25 1432    Lidocaine (LIDOCARE) 4 % Patch 1 patch  1 patch Transdermal Q24H Deepali Negrete MD   1 patch at 03/13/25 0825    lidocaine (viscous) (XYLOCAINE) 2 % solution 5 mL  5 mL Mouth/Throat Once Schat, " Brenda Chen,         magic mouthwash suspension (diphenhydramine, lidocaine, aluminum-magnesium & simethicone)  10 mL Swish & Spit TID AC Salvador Dickerson MD   10 mL at 03/14/25 0903    OLANZapine zydis (zyPREXA) ODT half-tab 2.5 mg  2.5 mg Oral BID Deepali Negrete MD   2.5 mg at 03/14/25 0902    polyethylene glycol (MIRALAX) Packet 17 g  17 g Oral Daily Piter Webber MD   17 g at 03/13/25 0820    senna-docusate (SENOKOT-S/PERICOLACE) 8.6-50 MG per tablet 1 tablet  1 tablet Oral At Bedtime Piter Webber MD   1 tablet at 03/10/25 2150    sodium chloride (PF) 0.9% PF flush 10-20 mL  10-20 mL Intracatheter Q28 Days Deepali Negrete MD   10 mL at 03/13/25 1630    sodium chloride (PF) 0.9% PF flush 3 mL  3 mL Intracatheter Q8H Salvador Dickerson MD   3 mL at 03/14/25 0900    thiamine (B-1) tablet 100 mg  100 mg Oral or Feeding Tube Daily Deepali Negrete MD   100 mg at 03/14/25 0902     Infusion Meds  Current Facility-Administered Medications   Medication Dose Route Frequency Provider Last Rate Last Admin    dextrose 10% infusion   Intravenous Continuous PRN Deepali Negrete MD           ALLERGIES:    Allergies   Allergen Reactions    Ibuprofen Nausea and Vomiting    Shrimp     Sulfa Antibiotics Rash     Patient started on bactrim 7/24/24 tolerating medication without rash on 7/25        REVIEW OF SYSTEMS:  A comprehensive of systems was negative except as noted above.    SOCIAL HISTORY:   Social History     Socioeconomic History    Marital status: Single     Spouse name: Not on file    Number of children: Not on file    Years of education: Not on file    Highest education level: Not on file   Occupational History    Not on file   Tobacco Use    Smoking status: Never    Smokeless tobacco: Never   Substance and Sexual Activity    Alcohol use: Not Currently    Drug use: No    Sexual activity: Not on file   Other Topics Concern    Parent/sibling w/ CABG, MI or angioplasty before 65F 55M? Not Asked    Social History Narrative    Not on file     Social Drivers of Health     Financial Resource Strain: Unknown (3/12/2025)    Financial Resource Strain     Within the past 12 months, have you or your family members you live with been unable to get utilities (heat, electricity) when it was really needed?: Patient unable to answer   Food Insecurity: Unknown (3/12/2025)    Food Insecurity     Within the past 12 months, did you worry that your food would run out before you got money to buy more?: Patient unable to answer     Within the past 12 months, did the food you bought just not last and you didn t have money to get more?: Patient unable to answer   Transportation Needs: Unknown (3/12/2025)    Transportation Needs     Within the past 12 months, has lack of transportation kept you from medical appointments, getting your medicines, non-medical meetings or appointments, work, or from getting things that you need?: Patient unable to answer   Physical Activity: Not on file   Stress: Not on file   Social Connections: Not on file   Interpersonal Safety: Unknown (3/12/2025)    Interpersonal Safety     Do you feel physically and emotionally safe where you currently live?: Patient unable to answer     Within the past 12 months, have you been hit, slapped, kicked or otherwise physically hurt by someone?: Patient unable to answer     Within the past 12 months, have you been humiliated or emotionally abused in other ways by your partner or ex-partner?: Patient unable to answer   Housing Stability: Unknown (3/12/2025)    Housing Stability     Do you have housing? : Patient unable to answer     Are you worried about losing your housing?: Patient unable to answer       FAMILY MEDICAL HISTORY:   Family History   Problem Relation Age of Onset    Heart Disease Father     Heart Failure Father     No Known Problems Brother     No Known Problems Brother     No Known Problems Brother     Pancreatitis Brother     Hypertension Sister      Peripheral Vascular Disease Sister     No Known Problems Sister     No Known Problems Son     No Known Problems Daughter        PHYSICAL EXAM:   Temp  Av  F (36.1  C)  Min: 93  F (33.9  C)  Max: 98.7  F (37.1  C)      Pulse  Av.7  Min: 52  Max: 111 Resp  Av.4  Min: 13  Max: 20  SpO2  Av.9 %  Min: 91 %  Max: 100 %       /84 (BP Location: Left arm)   Pulse 73   Temp 96.9  F (36.1  C) (Temporal)   Resp 20   Wt 61.2 kg (134 lb 14.7 oz)   SpO2 93%   BMI 23.90 kg/m     Date 25 0700 - 25 0659   Shift 2118-4006 1288-2937 6540-8067 24 Hour Total   INTAKE   P.O. 470   470   Colloid 500   500   Shift Total(mL/kg) 970(15.85)   970(15.85)   OUTPUT   Shift Total(mL/kg)       Weight (kg) 61.2 61.2 61.2 61.2      Admit Weight: 61.2 kg (134 lb 14.7 oz)     General Appearance: Ill-looking pt, cachectic.  Chest: Right chest port catheter in place.  Respiratory: On room air. Lungs are clear to auscultation bilaterally.    Cardiovascular: Regular rate and rhythm.    GI: Normal bowel sounds, Soft, nontender, nondistended.  Suprapubic catheter in place draining scant amount of urine.  Extremities: Bilateral lower extremity edema.   Neurology:  Moving all extremities appropriately    LABS:   CMP  Recent Labs   Lab 25  0521 25  0652 25  0534 25  0309    141 141 139   POTASSIUM 3.2* 3.4 3.6 3.6   CHLORIDE 110* 110* 110* 109*   CO2 21* 22 22 22   ANIONGAP 12 9 9 8   GLC 75 82 86 93   BUN 31.8* 32.5* 31.7* 28.1*   CR 1.39* 1.50* 1.51* 1.29*   GFRESTIMATED 40* 37* 37* 44*   SAMUEL 9.2 8.7* 8.2* 8.4*   MAG 2.2 2.2 2.2 2.1   PHOS 2.4* 2.9 3.5 3.2   PROTTOTAL 5.4* 4.6* 4.6* 4.6*   ALBUMIN 4.1 3.2* 2.3* 2.4*   BILITOTAL 0.9 0.5 0.5 0.5   ALKPHOS 146 114 167* 174*   AST 26 19 24 28   ALT 15 16 26 29     CBC  Recent Labs   Lab 25  0521 25  1722 25  0810 25  0507 25  0309   HGB 7.3* 7.3* 5.6* 8.2* 7.9*   WBC 6.7  --  5.8 8.1 7.5   RBC 2.39*  --  1.83*  2.63* 2.58*   HCT 21.8*  --  17.3* 24.8* 23.5*   MCV 91  --  95 94 91   MCH 30.5  --  30.6 31.2 30.6   MCHC 33.5  --  32.4 33.1 33.6   RDW 23.9*  --  26.5* 25.4* 25.3*   PLT 66*  --  74* 148* 135*     INRNo lab results found in last 7 days.  ABGNo lab results found in last 7 days.   URINE STUDIES  Recent Labs   Lab Test 03/11/25  1445 03/07/25  0832 03/04/25  2030 02/23/25  1728 07/21/24  1705 04/11/24  1152 05/23/23  0820 02/15/23  0927   COLOR Orange* Orange* Yellow Dark Brown*   < > Yellow   < > Yellow   APPEARANCE Cloudy* Cloudy* Slightly Cloudy* Cloudy*   < > Clear   < > Clear   URINEGLC Negative Negative Negative Negative   < > Negative   < > Negative   URINEBILI Negative Negative Negative Negative   < > Negative   < > Negative   URINEKETONE Negative Trace* Negative Negative   < > Negative   < > Negative   SG 1.020 1.026 1.014 1.021   < > 1.010   < > 1.015   UBLD Large* Large* Large* Large*   < > Moderate*   < > Large*   URINEPH 5.5 5.5 5.5 5.5   < > 7.0   < > 7.0   PROTEIN 200* 200* 50* 100*   < > Trace*   < > Negative   UROBILINOGEN  --   --   --   --   --  1.0  --  1.0   NITRITE Negative Negative Negative Negative   < > Positive*   < > Positive*   LEUKEST Large* Large* Large* Large*   < > Large*   < > Small*   RBCU >182* 61* >182* >182*   < > 10-25*   < > 25-50*   WBCU >182* >182* >182* >182*   < > >100*   < > 10-25*    < > = values in this interval not displayed.     No lab results found.  PTH  No lab results found.  IRON STUDIES  Recent Labs   Lab Test 02/15/25  0836 09/24/24  1330   IRON 17* 22*   FEB 40* 100*   IRONSAT 43 22   CASSANDRA 270 450*       IMAGING:  The radiologist's report which is filed    Eyadeh Mdanat, MD

## 2025-03-14 NOTE — PLAN OF CARE
Goal Outcome Evaluation:      Plan of Care Reviewed With: patient    Overall Patient Progress: no changeOverall Patient Progress: no change           Activity: Not OOB, pt unable to turn to side or sit up due to stiffness, turned by boosting and shifting weight on buttocks with pillows.  Neuro: Disoriented to time overnight, illogical speech at times, 4/5 uppers 2/5 lowers  Cardiac: WDL  Respiratory: WDL on RA  GI/: Suprapubic catheter with minimal output overnight, team aware, x1 loose BM overnight  Diet: Regular, usha counts until 3/15  Skin: See flowsheets for assessment, sacral pressure injury dressing changed x1, wound bed cleansed with microklenz, patted dry, triad ointment applied and foam dressing placed. Pt reported pain in BLEs at 0530, stockings removed and gentle massage performed.  Lines/Drains: Port cath hep locked  Pain/Nausea: 7/10 buttocks pain somewhat managed with 0.5mg IV dilaudid

## 2025-03-14 NOTE — PROGRESS NOTES
Radiology Note 3/14/2025    Feeding tube placement request under fluoroscopy received for Alis Hartman. This 69 year old female patient with a complex past medical history notable for serous endometrial adenocarcinoma and leandra-en-Y bypass is admitted with confusion and weakness in the setting of acute kidney injury, acute on chronic pain, and decreased oral intake. Request for feeding tube placement under fluoroscopy secondary to history of gastric bypass.     Patient was administered her usual pain management and anti-psychotic medications on the floor immediately prior to arrival in fluoroscopy.    Upon arrival in the fluoroscopy suite, the patient was awake but not alert or oriented. She was bent at the waste on her gurney, quietly repeating disconnected statements. She was unaware of plan for feeding tube and unable to cooperate with procedure. The patient was unable or unwilling to straighten her neck or back, which is necessary for fluoroscopy placement of a feeding tube.     Discussed with ordering provider Dr. Negrete and radiology attending physician Dr. Guardado, we are unable to place a feeding tube under fluoroscopic guidance for the above reasons.     Marquise Mera MD on 3/14/2025 at 3:05 PM  PGY-2

## 2025-03-14 NOTE — CONSULTS
The purpose of this note, including any accompanying recommendation, is advisory only concerning ethical principles and considerations related to this case. Determination of appropriate patient care decisions is the responsibility of the clinical team in consultation with patients and their decision makers and relevant institutional policy. Legal and policy questions should be addressed with Risk Management.     Recommendation: continue care as outlined in the 3/14 11:30am palliative care note    The ethics team has been consulted to help resolve a conflict in goals of care. Aranza has a Provider Orders for Life Sustaining Treatment (POLST), signed by her physician while she was a resident at [facility] last July, indicating her preference for life-sustaining treatment that includes resuscitation, artificial nutrition via tube feeding, and parenteral antibiotics. Aranza's daughter said previously during this hospitalization that, as Aranza's surrogate decisionmaker, she wanted the medical team to follow these preferences. As with all medical orders, POLST can and should be reviewed and potentially revised when a person's condition changes. The current medical team indicates that Aranza is very ill, with comorbidities extensively described elsewhere in the EHR, and that a stronger emphasis on palliation, including hospice and treating her agitation and avoiding tube feeding, is most likely in her best interests because the burdens of restorative treatments outweigh the benefits to Aranza.    After this consult was requested, further discussions occurred between the clinical team and Aranza's daughter, as described in the EHR and in my conversation with Deepali Negrete. As indicated in the clear note by Ling Franco from palliative, there is now agreement on a timed trial of artificial nutrition via a nasally passed tube, after which Aranza's daughter and the medical team will assess the impact and the burdens and  benefits of further treatment options. Agreement also reportedly exists that if Aranza strongly resists this intervention, the clinical team will not persist. A future care conference is planned for further shared decisionmaking.    Thank you for consulting us on the care of this patient. Please reach out again as needed.    Emily Lao DNP, KARL, RN  [facility] Clinical Ethics Consult Service

## 2025-03-14 NOTE — PROGRESS NOTES
Calorie Count  Intake recorded for: 3/13  Total Kcals: 68 Total Protein: 2g  Kcals from Hospital Food: 68   Protein: 2g  Kcals from Outside Food (average):0 Protein: 0g  # Meals Ordered from Kitchen: 1  # Meals Recorded: 2 small recorded   Meal 1: 10% mashed potatoes (per Epic, at 1200)   Meal 2: 50% everton pudding (per Epic, at 1500)  # Supplements Recorded: 0

## 2025-03-14 NOTE — PLAN OF CARE
Goal Outcome Evaluation:    [id number]  /59 (BP Location: Left arm, Cuff Size: Adult Small)   Pulse 76   Temp 96.6  F (35.9  C) (Tympanic)   Resp 20   Wt 61.2 kg (134 lb 14.7 oz)   SpO2 (!) 79%   BMI 23.90 kg/m      Pt oriented to self. Bed alarm on for safety. Crying and screaming for coccyx pain. Gave x1 prn Dilaudid. Turn and made comfortable as needed. Chest port, SL.  Coccyx wound dressing change after bowel incontinence. Mepilexes on the thigh and heals are intact. BLE wraps on. Suprapubic with little output. Bladder scan 76 ml.  Received 1 unit of RBC in the morning, hgb 7.3 after rechecked. Continue w/poc

## 2025-03-14 NOTE — PROGRESS NOTES
"[facility]    Medicine Progress Note - Hospitalist Service, GOLD TEAM 8    Date of Admission:  3/4/2025    Assessment & Plan   \"Aranza\" Alis Hartman is a 69 year old woman admitted on 2/5/2025. 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  Hospital course complicated by MADHU (improving) and hypervolemia, acute on chronic pain, difficult neurocognitive status/likely dementia and psychiatric history, decreased oral intake/malnutrition and ongoing discussion of goals of care.  Patient's family elected for enteric tube placement and full code with time-limited trial for tube placement.  Ethics is consulted to assist with conversations around goals of care given patient's family request.        Today:   - XR radiology consult for enteric tube placement given Kiko-en-Y gastric bypass.  Attempted to place however unable to place due to patient's neurologic status.  See below for details.  - Hypokalemia this a.m., RN will replete once weight has been obtained  - Hemoglobin stable this a.m., continue to monitor daily  - Right lower extremity ultrasound ordered given mild discrepancy in size  - Lymphedema consult placed to 03/13, nursing to contact to see patient given bilateral lower extremity edema  - Creatinine improved after 2 doses of albumin, continue to monitor    Hypokalemia likely, secondary to decreased oral intake  - RN replacement protocol ordered, difficulty with replacement today due to difficulty with getting weight.  Patient is unable to stand or straighten her " body.  Will attempt bed weight.    Acute on chronic anemia and thrombocytopenia, improved  Hemoglobin 5.6 03/13 morning, has been 7-8.  No obvious bleeding.  Likely due to malnutrition and acute illness.  Approved with 1 unit blood  - Prophylactic Lovenox discontinued    MADHU, oliguric, likely secondary to poor oral intake/malnutrition, creatinine improving   Anasarca  Creatinine at baseline 0.8-0.9 and admission. Trended up from 3/06, then peaked at 1.29 on 2/08, trended down and starting to trend up again 3/10.  Elevated urine protein creatinine ratio and urine albumin.UA 3/11: 200 albumin, large blood, large leukocyte Estrace, greater than 182 WBC, moderate bacteria, many yeast, hyaline casts.  Other differentials considered include deconditioning causing ischemic ATN, calcium oxalate nephropathy in the setting of gastric bypass surgery.  Renal US normal  Was on IV fluids, stopped 3/11  - Nephrology consulted.  Received 100 g of 25% albumin 03/12 and 03/13 .    -Holding on further glomerulonephritis workup as patient is not a good candidate for renal biopsy and immunosuppressants  - Lymphedema consulted, appreciate assistance  - Strict I's and O's, daily weights  - Suprapubic catheter in place  - Monitor on BMP     - RLE ultrasound ordered due to mild discrepancy in size however unable to be performed since patient cannot lay flat.     Delirium v/s Metabolic Encephalopathy vs Unspecified neurocognitive Disorder: MOCA 25/30 9/22/22   Schizophrenia   RAFAEL   On admission patient's family was concerned about her confusion. Patient has paranoia with suspected hallucinations. Acute worsening was likely related to possible UTI v/s dehydration lack of PO. B12 FOLATE, ammonia all normal. Initially considered CT head but patient is unable to lie flat.  - Psychiatry consulted, saw patient 3/11, patient denied depression, hallucinations and psych could not elicit any paranoia symptoms.    -Zyprexa 2.5 mg twice daily started  01/13  - Delirium precautions  - Pain management per below.    Acute on Chronic Pain  Patient reporting thigh, low back pain related to her coccyx wound mentioned below.  Usually hunched over, screaming in the room.  - Oxycodone 10 mg q3h prn tablet or solution  - IV dilaudid 0.5 mg Q2H prn for breakthrough pain   - Lidocaine patch  - Buprenorphine patch started 3/13     Goals of care   Recurrent hospitalizations with progressive step-wise decline  Severe Protein-Calorie Malnutrition   Advanced care planning Discussion  Sacral pressure ulcer  -3/14 : Updated patient's daughter with palliative care (please see their notes for further details).  Informed her that we were unable to place the feeding tube due to patient disorientation and positioning.  We will continue a time-limited trial of 1 week on the [employer] meds and plan for a care conference mid next week to reevaluate.  CODE STATUS remains full code.  We did emphasize to patient's family that we recommend hospice/comfort cares, and if patient continues to be disoriented/unable to consent to enteric tube, would recommend not placing tube.  - 3/13: Planned care conference today with oncology, palliative care.  Called patient's daughter the day prior and the morning of to confirm time.  Patient's daughter did not show up until about 1 PM.  Care conference had to be rescheduled.  Oncology was able to connect with the familyafter showing up.  Please see their documentation for full conversation.  Subsequently patient's daughter elected to keep patient full code and will like enteric tube placed for nutrition.  Attempted to see daughter at bedside later, she had left the hospital.  I called her to discuss the nutrition component further with her.  We discussed that we will give the patient the opportunity to get nutrition through the tube for a maximum of 7 days and reevaluate. I also did inform her that if the patient refuses to have the tube placed or appears  uncomfortable or pulls the tube we will not force her or replace the tube.  She expressed understanding.  I also did inform her that ethics is consulted to assist with conversations around her mother's care.  - 03/12: Extensive discussion with the patient's daughter regarding overall clinical condition and goals of care.  I did inform the daughter that her mother has been refusing all medications, food, cares, while inpatient.  Daughter expressed that she was worried that maybe her mom was paranoid.  I did inform her that she was seen by psychiatry who did not elicit any paranoia symptoms, however due to recommend treating schizophrenia with Zyprexa if family desires.  She had not made a decision about it.  She had questions about feeding tube, I did inform her that her mother is unable to lay down and is usually slumped over in a feeding tube may not be feasible, and having extra tube can make her delirium/mental health worse.  She expressed understanding, and is willing to come in 03/13 at 12 PM for Shanti discussion of goals of care, CODE STATUS with the primary team and palliative care.  - Nutrition consulted, appreciate recommendations.  Continue calorie Counts ordered for 3/11-3/15-no significant oral intake charted.  -XR feeding tube unable to be placed due to patient disorientation and difficulty laying flat (please see radiology notes for details).  -Dietitian consult for tube feeds  - WOC consulted    --------------------------------------------------------------   Afib   Rate controlled   Prior admission had risk/benefit discussion and discontinued AC due to vaginal bleeding.     Serous endometrial carcinoma  Follows w/ heme/onc through [place]. S/p SNEHA, BSO 07/2024 s/p cycle 3 carbo/taxel 10/15/2024, cycle 4 delayed in s/o recent admission, family ultimately decided to forgo any further treatment.   During care conference, it was expressed that, her cancer is likely to recur due to it being an  aggressive type but when it will recur is unknown. However, should it recur, GynOnc team expressed that, because of her functional status (significant weakness) and malnutrition, treatment would not be recommended since that would worsen her health overall and benefits would not outweigh the risks.    Pyuria   Hx of ESBL urosepsis and bacteremia  Bladder dysfunction s/p cystostomy and suprapubic catheter  Recently admitted 11/26 - 12/8/2024 for ESBL urosepsis and bacteremia requiring ICU w/ L nephrostomy tube (removed 1/7) treated w/ ertapenem, returned 1/25-1/27 for concern for UTI but no clear infection at that time.  ID was consulted that admission and recommended avoiding frequent UA and Ucx checks in future unless patient w/ symptoms of UTI. This admission increased confusion, poor appetite and increased generalized weakness, foul smelling urine. CT abdomen unremarkable. Not septic on presentation. U/A showed growing [id number] mixed elisa. U/A collected from exchanged kent in the ED 3/4. S/p unasyn (3/4-3/5). S/p zosyn (3/5-3/6).   -Continue suprapubic catheter               Diet: Combination Diet Regular Diet Adult  Snacks/Supplements Adult: Ensure Enlive; With Meals  Snacks/Supplements Adult: Boost Soothe; Between Meals  Calorie Counts  Adult Formula Drip Feeding: Continuous Jevity 1.5; Nasogastric tube; Goal Rate: Once FT placed/placement confirmed and provider approves TF start, start @ 10 mL/hr and advance by 10 mL q12h as tolerated to goal rate 50 mL/hr; mL/hr; Do not advance...    DVT Prophylaxis: Pneumatic Compression Devices  Kent Catheter: Not present  Lines: PRESENT      Port a Cath 02/05/25 Single Lumen Right Chest wall-Site Assessment: WDL      Cardiac Monitoring: None  Code Status: Full Code      Clinically Significant Risk Factors          # Hyperchloremia: Highest Cl = 110 mmol/L in last 2 days, will monitor as appropriate          # Hypoalbuminemia: Lowest albumin = 2.3 g/dL at 3/12/2025   5:34 AM, will monitor as appropriate   # Thrombocytopenia: Lowest platelets = 66 in last 2 days, will monitor for bleeding  # Acute Kidney Injury, unspecified: based on a >150% or 0.3 mg/dL increase in last creatinine compared to past 90 day average, will monitor renal function  # Hypertension: Noted on problem list             # Severe Malnutrition: based on nutrition assessment and treatment provided per dietitian's recommendations.    # Financial/Environmental Concerns: none         Social Drivers of Health    Food Insecurity: Unknown (3/12/2025)    Food Insecurity     Within the past 12 months, did you worry that your food would run out before you got money to buy more?: Patient unable to answer     Within the past 12 months, did the food you bought just not last and you didn t have money to get more?: Patient unable to answer   Housing Stability: Unknown (3/12/2025)    Housing Stability     Do you have housing? : Patient unable to answer     Are you worried about losing your housing?: Patient unable to answer   Financial Resource Strain: Unknown (3/12/2025)    Financial Resource Strain     Within the past 12 months, have you or your family members you live with been unable to get utilities (heat, electricity) when it was really needed?: Patient unable to answer   Transportation Needs: Unknown (3/12/2025)    Transportation Needs     Within the past 12 months, has lack of transportation kept you from medical appointments, getting your medicines, non-medical meetings or appointments, work, or from getting things that you need?: Patient unable to answer   Interpersonal Safety: Unknown (3/12/2025)    Interpersonal Safety     Do you feel physically and emotionally safe where you currently live?: Patient unable to answer     Within the past 12 months, have you been hit, slapped, kicked or otherwise physically hurt by someone?: Patient unable to answer     Within the past 12 months, have you been humiliated or  emotionally abused in other ways by your partner or ex-partner?: Patient unable to answer          Disposition Plan     Medically Ready for Discharge: Anticipated in 5+ Days             Deepali Negrete MD  Hospitalist Service, GOLD TEAM 8  [facility]  Securely message with [employer] (more info)  Text page via [facility] Paging/Directory   See signed in provider for up to date coverage information  ______________________________________________________________________    Interval History   No acute overnight events.  In bed this morning, awake.  However not really responding to questions about review of systems.    Physical Exam   Vital Signs: Temp: 96.9  F (36.1  C) Temp src: Temporal BP: 103/84 Pulse: 73   Resp: 20 SpO2: 93 % O2 Device: None (Room air)    Weight: 134 lbs 14.74 oz    General Appearance:  Awake, Alert, Oriented to self and location, cachectic  Chest: Right chest port catheter in place  Respiratory: Breathing comfortably on room air. Lungs are clear to auscultation bilaterally.    Cardiovascular: Regular rate and rhythm, extremities perfused.    GI: Normal bowel sounds, Soft, nontender, nondistended.  Suprapubic catheter in place draining scant amount of urine  Extremities: Bilateral lower extremity edema, multiple lower extremity and digit wounds  Neurology:  moving all extremities appropriately     Medical Decision Making       90 MINUTES SPENT BY ME on the date of service doing chart review, history, exam, documentation & further activities per the note.      Data   ------------------------- PAST 24 HR DATA REVIEWED -----------------------------------------------    I have personally reviewed the following data over the past 24 hrs:    6.7  \   7.3 (L)   / 66 (L)     143 110 (H) 31.8 (H) /  75   3.2 (L) 21 (L) 1.39 (H) \     ALT: 15 AST: 26 AP: 146 TBILI: 0.9   ALB: 4.1 TOT PROTEIN: 5.4 (L) LIPASE: N/A       Imaging results reviewed over the past 24 hrs:   No  results found for this or any previous visit (from the past 24 hours).

## 2025-03-15 LAB
ALBUMIN SERPL BCG-MCNC: 2.8 G/DL (ref 3.5–5.2)
ALP SERPL-CCNC: 102 U/L (ref 40–150)
ALT SERPL W P-5'-P-CCNC: 11 U/L (ref 0–50)
ANION GAP SERPL CALCULATED.3IONS-SCNC: 12 MMOL/L (ref 7–15)
AST SERPL W P-5'-P-CCNC: 16 U/L (ref 0–45)
BASOPHILS # BLD AUTO: 0 10E3/UL (ref 0–0.2)
BASOPHILS NFR BLD AUTO: 0 %
BILIRUB SERPL-MCNC: 0.6 MG/DL
BUN SERPL-MCNC: 24.2 MG/DL (ref 8–23)
CALCIUM SERPL-MCNC: 6.9 MG/DL (ref 8.8–10.4)
CHLORIDE SERPL-SCNC: 122 MMOL/L (ref 98–107)
CREAT SERPL-MCNC: 0.91 MG/DL (ref 0.51–0.95)
EGFRCR SERPLBLD CKD-EPI 2021: 67 ML/MIN/1.73M2
EOSINOPHIL # BLD AUTO: 0 10E3/UL (ref 0–0.7)
EOSINOPHIL NFR BLD AUTO: 0 %
ERYTHROCYTE [DISTWIDTH] IN BLOOD BY AUTOMATED COUNT: 25.2 % (ref 10–15)
ERYTHROCYTE [DISTWIDTH] IN BLOOD BY AUTOMATED COUNT: 25.3 % (ref 10–15)
FERRITIN SERPL-MCNC: 281 NG/ML (ref 11–328)
GLUCOSE BLDC GLUCOMTR-MCNC: 63 MG/DL (ref 70–99)
GLUCOSE BLDC GLUCOMTR-MCNC: 71 MG/DL (ref 70–99)
GLUCOSE BLDC GLUCOMTR-MCNC: 96 MG/DL (ref 70–99)
GLUCOSE BLDC GLUCOMTR-MCNC: 98 MG/DL (ref 70–99)
GLUCOSE SERPL-MCNC: 50 MG/DL (ref 70–99)
HCO3 SERPL-SCNC: 15 MMOL/L (ref 22–29)
HCT VFR BLD AUTO: 21.9 % (ref 35–47)
HCT VFR BLD AUTO: 24 % (ref 35–47)
HGB BLD-MCNC: 7.4 G/DL (ref 11.7–15.7)
HGB BLD-MCNC: 8 G/DL (ref 11.7–15.7)
IMM GRANULOCYTES # BLD: 0 10E3/UL
IMM GRANULOCYTES NFR BLD: 0 %
IRON BINDING CAPACITY (ROCHE): ABNORMAL
IRON SATN MFR SERPL: ABNORMAL %
IRON SERPL-MCNC: 9 UG/DL (ref 37–145)
LYMPHOCYTES # BLD AUTO: 1.1 10E3/UL (ref 0.8–5.3)
LYMPHOCYTES NFR BLD AUTO: 15 %
MAGNESIUM SERPL-MCNC: 1.6 MG/DL (ref 1.7–2.3)
MAGNESIUM SERPL-MCNC: 2.1 MG/DL (ref 1.7–2.3)
MCH RBC QN AUTO: 30.5 PG (ref 26.5–33)
MCH RBC QN AUTO: 31.2 PG (ref 26.5–33)
MCHC RBC AUTO-ENTMCNC: 33.3 G/DL (ref 31.5–36.5)
MCHC RBC AUTO-ENTMCNC: 33.8 G/DL (ref 31.5–36.5)
MCV RBC AUTO: 92 FL (ref 78–100)
MCV RBC AUTO: 92 FL (ref 78–100)
MONOCYTES # BLD AUTO: 0.4 10E3/UL (ref 0–1.3)
MONOCYTES NFR BLD AUTO: 6 %
NEUTROPHILS # BLD AUTO: 5.5 10E3/UL (ref 1.6–8.3)
NEUTROPHILS NFR BLD AUTO: 78 %
NRBC # BLD AUTO: 0 10E3/UL
NRBC BLD AUTO-RTO: 0 /100
PHOSPHATE SERPL-MCNC: 2.1 MG/DL (ref 2.5–4.5)
PHOSPHATE SERPL-MCNC: 2.5 MG/DL (ref 2.5–4.5)
PLATELET # BLD AUTO: 66 10E3/UL (ref 150–450)
PLATELET # BLD AUTO: 68 10E3/UL (ref 150–450)
POTASSIUM SERPL-SCNC: 3.1 MMOL/L (ref 3.4–5.3)
POTASSIUM SERPL-SCNC: 4.3 MMOL/L (ref 3.4–5.3)
PROCALCITONIN SERPL IA-MCNC: 1.48 NG/ML
PROT SERPL-MCNC: 3.9 G/DL (ref 6.4–8.3)
RBC # BLD AUTO: 2.37 10E6/UL (ref 3.8–5.2)
RBC # BLD AUTO: 2.62 10E6/UL (ref 3.8–5.2)
SODIUM SERPL-SCNC: 144 MMOL/L (ref 135–145)
SODIUM SERPL-SCNC: 149 MMOL/L (ref 135–145)
VIT B12 SERPL-MCNC: 982 PG/ML (ref 232–1245)
WBC # BLD AUTO: 6.7 10E3/UL (ref 4–11)
WBC # BLD AUTO: 7 10E3/UL (ref 4–11)

## 2025-03-15 PROCEDURE — 99232 SBSQ HOSP IP/OBS MODERATE 35: CPT | Mod: GC | Performed by: INTERNAL MEDICINE

## 2025-03-15 PROCEDURE — 82607 VITAMIN B-12: CPT | Performed by: STUDENT IN AN ORGANIZED HEALTH CARE EDUCATION/TRAINING PROGRAM

## 2025-03-15 PROCEDURE — 250N000013 HC RX MED GY IP 250 OP 250 PS 637

## 2025-03-15 PROCEDURE — 84132 ASSAY OF SERUM POTASSIUM: CPT | Performed by: STUDENT IN AN ORGANIZED HEALTH CARE EDUCATION/TRAINING PROGRAM

## 2025-03-15 PROCEDURE — 84145 PROCALCITONIN (PCT): CPT

## 2025-03-15 PROCEDURE — 84100 ASSAY OF PHOSPHORUS: CPT | Performed by: STUDENT IN AN ORGANIZED HEALTH CARE EDUCATION/TRAINING PROGRAM

## 2025-03-15 PROCEDURE — 250N000013 HC RX MED GY IP 250 OP 250 PS 637: Performed by: INTERNAL MEDICINE

## 2025-03-15 PROCEDURE — 250N000011 HC RX IP 250 OP 636: Mod: JZ | Performed by: PHARMACIST

## 2025-03-15 PROCEDURE — 84100 ASSAY OF PHOSPHORUS: CPT

## 2025-03-15 PROCEDURE — 83735 ASSAY OF MAGNESIUM: CPT | Performed by: STUDENT IN AN ORGANIZED HEALTH CARE EDUCATION/TRAINING PROGRAM

## 2025-03-15 PROCEDURE — 83735 ASSAY OF MAGNESIUM: CPT

## 2025-03-15 PROCEDURE — 99233 SBSQ HOSP IP/OBS HIGH 50: CPT | Performed by: STUDENT IN AN ORGANIZED HEALTH CARE EDUCATION/TRAINING PROGRAM

## 2025-03-15 PROCEDURE — 120N000002 HC R&B MED SURG/OB UMMC

## 2025-03-15 PROCEDURE — 85025 COMPLETE CBC W/AUTO DIFF WBC: CPT

## 2025-03-15 PROCEDURE — 80053 COMPREHEN METABOLIC PANEL: CPT

## 2025-03-15 PROCEDURE — 84295 ASSAY OF SERUM SODIUM: CPT

## 2025-03-15 PROCEDURE — 86140 C-REACTIVE PROTEIN: CPT

## 2025-03-15 PROCEDURE — 258N000003 HC RX IP 258 OP 636: Performed by: STUDENT IN AN ORGANIZED HEALTH CARE EDUCATION/TRAINING PROGRAM

## 2025-03-15 PROCEDURE — 83540 ASSAY OF IRON: CPT | Performed by: STUDENT IN AN ORGANIZED HEALTH CARE EDUCATION/TRAINING PROGRAM

## 2025-03-15 PROCEDURE — 84295 ASSAY OF SERUM SODIUM: CPT | Performed by: STUDENT IN AN ORGANIZED HEALTH CARE EDUCATION/TRAINING PROGRAM

## 2025-03-15 PROCEDURE — 85027 COMPLETE CBC AUTOMATED: CPT

## 2025-03-15 PROCEDURE — 82728 ASSAY OF FERRITIN: CPT | Performed by: STUDENT IN AN ORGANIZED HEALTH CARE EDUCATION/TRAINING PROGRAM

## 2025-03-15 PROCEDURE — 250N000013 HC RX MED GY IP 250 OP 250 PS 637: Performed by: STUDENT IN AN ORGANIZED HEALTH CARE EDUCATION/TRAINING PROGRAM

## 2025-03-15 RX ORDER — DEXTROSE MONOHYDRATE 50 MG/ML
INJECTION, SOLUTION INTRAVENOUS CONTINUOUS
Status: DISCONTINUED | OUTPATIENT
Start: 2025-03-15 | End: 2025-03-16

## 2025-03-15 RX ORDER — POTASSIUM CHLORIDE 750 MG/1
40 TABLET, EXTENDED RELEASE ORAL ONCE
Status: COMPLETED | OUTPATIENT
Start: 2025-03-15 | End: 2025-03-15

## 2025-03-15 RX ORDER — MAGNESIUM OXIDE 400 MG/1
400 TABLET ORAL EVERY 4 HOURS
Status: COMPLETED | OUTPATIENT
Start: 2025-03-15 | End: 2025-03-15

## 2025-03-15 RX ORDER — DEXTROSE MONOHYDRATE 50 MG/ML
INJECTION, SOLUTION INTRAVENOUS CONTINUOUS
Status: DISCONTINUED | OUTPATIENT
Start: 2025-03-15 | End: 2025-03-15

## 2025-03-15 RX ADMIN — POTASSIUM & SODIUM PHOSPHATES POWDER PACK 280-160-250 MG 1 PACKET: 280-160-250 PACK at 15:22

## 2025-03-15 RX ADMIN — DEXTROSE MONOHYDRATE: 50 INJECTION, SOLUTION INTRAVENOUS at 20:57

## 2025-03-15 RX ADMIN — Medication 1 SPRAY: at 08:51

## 2025-03-15 RX ADMIN — OXYCODONE HYDROCHLORIDE 10 MG: 10 TABLET ORAL at 09:50

## 2025-03-15 RX ADMIN — POLYETHYLENE GLYCOL 3350 17 G: 17 POWDER, FOR SOLUTION ORAL at 08:43

## 2025-03-15 RX ADMIN — MAGNESIUM OXIDE TAB 400 MG (241.3 MG ELEMENTAL MG) 400 MG: 400 (241.3 MG) TAB at 11:37

## 2025-03-15 RX ADMIN — Medication 2.5 MG: at 08:43

## 2025-03-15 RX ADMIN — ACETAMINOPHEN 975 MG: 325 TABLET, FILM COATED ORAL at 08:43

## 2025-03-15 RX ADMIN — MAGNESIUM OXIDE TAB 400 MG (241.3 MG ELEMENTAL MG) 400 MG: 400 (241.3 MG) TAB at 15:22

## 2025-03-15 RX ADMIN — Medication 1 SPRAY: at 20:25

## 2025-03-15 RX ADMIN — HYDROMORPHONE HYDROCHLORIDE 0.5 MG: 1 INJECTION, SOLUTION INTRAMUSCULAR; INTRAVENOUS; SUBCUTANEOUS at 05:53

## 2025-03-15 RX ADMIN — THIAMINE HCL TAB 100 MG 100 MG: 100 TAB at 08:43

## 2025-03-15 RX ADMIN — HYDROMORPHONE HYDROCHLORIDE 0.5 MG: 1 INJECTION, SOLUTION INTRAMUSCULAR; INTRAVENOUS; SUBCUTANEOUS at 01:12

## 2025-03-15 RX ADMIN — ACETAMINOPHEN 975 MG: 325 TABLET, FILM COATED ORAL at 15:22

## 2025-03-15 RX ADMIN — SENNOSIDES AND DOCUSATE SODIUM 1 TABLET: 50; 8.6 TABLET ORAL at 21:05

## 2025-03-15 RX ADMIN — POTASSIUM CHLORIDE 40 MEQ: 750 TABLET, EXTENDED RELEASE ORAL at 11:37

## 2025-03-15 RX ADMIN — POTASSIUM & SODIUM PHOSPHATES POWDER PACK 280-160-250 MG 1 PACKET: 280-160-250 PACK at 11:37

## 2025-03-15 RX ADMIN — DEXTROSE MONOHYDRATE: 50 INJECTION, SOLUTION INTRAVENOUS at 11:07

## 2025-03-15 RX ADMIN — POTASSIUM & SODIUM PHOSPHATES POWDER PACK 280-160-250 MG 1 PACKET: 280-160-250 PACK at 20:17

## 2025-03-15 RX ADMIN — Medication 2.5 MG: at 20:57

## 2025-03-15 RX ADMIN — LIDOCAINE 4% 1 PATCH: 40 PATCH TOPICAL at 08:43

## 2025-03-15 RX ADMIN — OXYCODONE HYDROCHLORIDE 10 MG: 5 SOLUTION ORAL at 20:17

## 2025-03-15 ASSESSMENT — ACTIVITIES OF DAILY LIVING (ADL)
ADLS_ACUITY_SCORE: 94

## 2025-03-15 NOTE — PROGRESS NOTES
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 Hospital stay complicated with MADHU and increased LE swelling and anasarca.    ##MADHU; Improved.  --Multifactorial; likely 2/2 poor oral intake, FTT, and deconditioing causing ischemic ATN.  --Pt did not respond to IV fluids and started to have 3rd spacing, worsening LE edema.  --Renal imaging ruled out obstruction and hydronephrosis, other possible causes is calcium oxalate nephropathy as pt has hx of gastric bypass surgery.  --UA showed large blood, LE +ve, WBC >182, RBC >182, Moderate bacteria, yeast +ve, hyaline cast +ve and urine specific gravity 1.02.  --Urine Na 25, indicating hypovolemia.  --Cr improving 0.91 03/15/2025 with Albumin with IV albumin.  --Agree with strict calorie count per primary team, and try to encourage oral intake as tolerated.  --Will hold on further GN workup as pt is not a good candidate for renal biopsy and immunosuppressants.  --Strict I&Os.  --Adjust all meds to kidney function.  --Avoid Nephrotoxins.    ##Anemia:  Hb 7.4.  Please repeat Iron studies.    ##Hypernatremia:  Na 149 today, BG 50.  Likely 2/2 poor oral intake.  Total water deficit for a goal Na of 145 is 0.9 L --> given the hypoglycemia, can give D5W 1 L over 12  hours.    ##Hypokalemia  ##Hypomagnesemia:  --Must correct Mg before correcting K.    ##Metabolic acidosis:  --AG 12, Corrected AG to Albumin is 16-- > HAGMA, possible starvation ketoacidosis causing it.    Recommendations were communicated to primary team via Vocera and Note.    Seen and discussed with Dr. Diehl.      Navneet Sims MD  Division of Renal Disease and Hypertension  [facility]  [employer]        REASON FOR CONSULT: MADHU    Interval Events:  Pt is still refusing to eat/drink or taking meds; 0 calorie count yesterday.  Improving UOP --> 400 ml yesterday --> today so far 500 ml.  XR feeding tube unable to be placed, pt was unable to be laid flat, Final discussion with palliative and family, to try tube feeds for 1 week and if no improvement they will discuss hospice again.    PAST MEDICAL HISTORY:  Reviewed with patient on 03/15/2025     Past Medical History:   Diagnosis Date    Atrial fibrillation (H)     Depression     Hypertension     Malignant neoplasm of endocervix (H)     Tx with radiation    Near syncope 11/7/2024    Orthopnea 9/21/2024    Other chronic pain     Low back    Schizophrenia (H)     Urinary incontinence        Past Surgical History:   Procedure Laterality Date    ABDOMINOPLASTY      BIOPSY CERVICAL, LOCAL EXCISION, SINGLE/MULTIPLE  10/26/2011    Procedure:BIOPSY CERVICAL, LOCAL EXCISION, SINGLE/MULTIPLE; EUA, cervical biopsies; Surgeon:BETTY TINEO; Location:[facility] OR    BIOPSY VAGINAL N/A 06/08/2021    Procedure: BIOPSY, VAGINA;  Surgeon: Dany Perez MD;  Location: [facility]    CYSTOSCOPY N/A 05/17/2024    Procedure: Cystoscopy;  Surgeon: Dany Perez MD;  [facility]    CYSTOSTOMY, INSERT TUBE SUPRAPUBIC, COMBINED  07/12/2024    Procedure: Insertion of supra-pubic catheter;  Surgeon: Dany Perez MD;  [facility]    DILATION AND CURETTAGE, WITH ULTRASOUND GUIDANCE N/A 05/17/2024    Procedure: Dilation and curettage of the uterus with drainage of  uterine fluid under ultrasound guidance, lysis of vaginal adhesions;  Surgeon: Dany Perez MD;  [facility]    EXAM UNDER ANESTHESIA PELVIC N/A 03/12/2020    Procedure: Exam under anesthsia, vaginal biopsies, possible CO2 laser of the vagina;  Surgeon: Lilliam Roy MD;  [facility]    GI SURGERY  2004    gastric bypass    HYSTERECTOMY TOTAL ABDOMINAL, BILATERAL SALPINGO-OOPHORECTOMY, COMBINED Bilateral 07/12/2024    Procedure: Diagnostic laparoscopy converted to exploratory laparotomy, total abdominal hysterectomy, bilateral salpingo-oophorectomy, lysis of adhesions >60 min;  Surgeon: Dany Perez MD;  [facility]    INSERT PORT VASCULAR ACCESS N/A 08/26/2024    Procedure: Insert port vascular access;  Surgeon: Avery Gregory MD;  [facility]    IR CHEST PORT PLACEMENT > 5 YRS OF AGE  08/26/2024    IR FOLLOW UP VISIT OUTPATIENT  1/7/2025    IR NEPHROSTOMY TUBE PLACEMENT LEFT  11/29/2024    LASER CO2 VAGINA N/A 03/02/2015    Procedure: LASER CO2 VAGINA;  Surgeon: Mariela Abdalla MD;  Location: [facility]    LASER CO2 VAGINA N/A 05/24/2019    Procedure: Exam under anesthesia, vaginal biopsies, CO2 laser of the vagina;  Surgeon: Lilliam Roy MD;  Location: [facility]    LASER CO2 VAGINA N/A 06/08/2021    Procedure: Exam under anesthesia, laser ablation of the upper vagina;  Surgeon: Dany Perez MD;  Location: [facility]    PICC DOUBLE LUMEN PLACEMENT Left 11/28/2024    left basilic 5 fr dl power picc 44 cm    REPAIR BLADDER N/A 07/12/2024    Procedure: Repair bladder;  Surgeon: Dany Perez MD;  [facility]        MEDICATIONS:  PTA Meds  Prior to Admission medications    Medication Sig Last Dose Taking? Auth Provider Long Term End Date   acetaminophen (TYLENOL) 325 MG tablet Take 3 tablets (975 mg) by mouth every 8 hours. 3/4/2025 Yes Maci Chandler MD No    albuterol (PROAIR HFA/PROVENTIL HFA/VENTOLIN HFA) 108 (90 Base) MCG/ACT inhaler Inhale 1-2 puffs into  the lungs every 4 hours as needed for shortness of breath  Yes Reported, Patient     calcium Citrate-vitamin D 500-400 MG-UNIT CHEW Take 1 tablet by mouth daily Unknown Yes Reported, Patient     cyanocobalamin (CYANOCOBALAMIN) 1000 MCG/ML injection Inject 1 mL (1,000 mcg) into a muscle every month. Unknown Yes Reported, Patient     diclofenac (VOLTAREN) 1 % topical gel Apply 4 g topically 4 times daily as needed for moderate pain. 3/3/2025 Yes Mar Chua MD     ferrous sulfate (CASSANDRA-IN-SOL) 75 (15 FE) MG/ML oral drops Take 15 mg by mouth daily Unknown Yes Reported, Patient     hydrocortisone 2.5 % cream Apply topically as needed  Yes Reported, Patient     lidocaine (XYLOCAINE) 5 % external ointment Apply 1 Application topically as needed  Yes Reported, Patient     magic mouthwash suspension, diphenhydrAMINE, lidocaine, aluminum-magnesium & simethicone, (FIRST-MOUTHWASH BLM) compounding kit Swish and spit 10 mLs in mouth 3 times daily (before meals). 3/4/2025 Yes Maci Chandler MD     melatonin 1 MG TABS tablet Take 1 tablet (1 mg) by mouth nightly as needed for sleep. 3/3/2025 Bedtime Yes Maci Chandler MD     morphine 0.5 % in solosite gel Apply 8-16 clicks (4-8 g) topically daily as needed for pain (with dressing changes).  Yes Kamilah De La Torre PA-C     naloxone (NARCAN) 4 MG/0.1ML nasal spray Spray 1 spray (4 mg) into one nostril alternating nostrils as needed for opioid reversal every 2-3 minutes until assistance arrives  Yes Gaurang Guajardo MD Yes    ondansetron (ZOFRAN) 8 MG tablet Take 1 tablet (8 mg) by mouth every 8 hours as needed for nausea  Yes Kelsey Mccracken APRN CNP     oxyCODONE IR (ROXICODONE) 20 MG TABS immediate release tablet Take 20 mg by mouth every 4 hours as needed for severe pain (Chronic Pain). 3/4/2025 Yes Maci Chandler MD No    phenol (CHLORASEPTIC) 1.4 % spray Take 1-2 sprays (1-2 mLs) by mouth every hour as needed for sore throat.  Yes Dany Perez MD    "  polyethylene glycol (MIRALAX) 17 GM/Dose powder Take 17 g by mouth daily as needed for constipation 3/3/2025 Yes Dany Perez MD     prochlorperazine (COMPAZINE) 5 MG tablet Take 1-2 tablets (5-10 mg) by mouth every 6 hours as needed for nausea or vomiting  Yes Kelsey Mccracken APRN CNP     Skin Protectants, Misc. (INTERDRY 10\"X36\") SHEE Externally apply 10 each topically every 24 hours. Apply to skin folds on abdomen  Yes Dany Perez MD     triamcinolone (KENALOG) 0.1 % external ointment Apply topically 3 times daily as needed for irritation.  Yes Dany Perez MD        Current Meds  Current Facility-Administered Medications   Medication Dose Route Frequency Provider Last Rate Last Admin    acetaminophen (TYLENOL) tablet 975 mg  975 mg Oral Q8H Salvador Dickerson MD   975 mg at 03/15/25 1522    artificial saliva (BIOTENE MT) solution 1 spray  1 spray Mouth/Throat 4x Daily Salvador Dickerson MD   1 spray at 03/15/25 0851    buprenorphine (BUTRANS) 10 MCG/HR WK patch 1 patch  1 patch Transdermal Weekly Deepali Negrete MD   1 patch at 03/13/25 1303    buprenorphine (BUTRANS) Patch in Place   Transdermal Q8H Deepali Negrete MD        [Held by provider] enoxaparin ANTICOAGULANT (LOVENOX) injection 40 mg  40 mg Subcutaneous Q24H Salvador Dickerson MD   40 mg at 03/13/25 0819    heparin lock flush 10 unit/mL injection 5-10 mL  5-10 mL Intracatheter Q24H Deepali Negrete MD   5 mL at 03/13/25 1428    heparin lock flush 100 unit/mL injection 5-10 mL  5-10 mL Intracatheter Q28 Days Deepali Negrete MD   5 mL at 03/13/25 1432    Lidocaine (LIDOCARE) 4 % Patch 1 patch  1 patch Transdermal Q24H Deepali Negrete MD   1 patch at 03/15/25 0843    magic mouthwash suspension (diphenhydramine, lidocaine, aluminum-magnesium & simethicone)  10 mL Swish & Spit TID AC Salvador Dickerson MD   10 mL at 03/14/25 1107    OLANZapine zydis (zyPREXA) ODT half-tab 2.5 mg  2.5 mg Oral BID Deepali Negrete MD   2.5 mg at " 03/15/25 0843    polyethylene glycol (MIRALAX) Packet 17 g  17 g Oral Daily Piter Webber MD   17 g at 03/15/25 0843    potassium & sodium phosphates (NEUTRA-PHOS) Packet 1 packet  1 packet Oral or Feeding Tube Q4H Deepali Negrete MD   1 packet at 03/15/25 1522    senna-docusate (SENOKOT-S/PERICOLACE) 8.6-50 MG per tablet 1 tablet  1 tablet Oral At Bedtime Piter Webber MD   1 tablet at 03/10/25 2150    sodium chloride (PF) 0.9% PF flush 10-20 mL  10-20 mL Intracatheter Q28 Days Deepali Negrete MD   10 mL at 03/13/25 1630    sodium chloride (PF) 0.9% PF flush 3 mL  3 mL Intracatheter Q8H Salvador Dickerson MD   3 mL at 03/15/25 0112    thiamine (B-1) tablet 100 mg  100 mg Oral or Feeding Tube Daily Deepali Negrete MD   100 mg at 03/15/25 0843     Infusion Meds  Current Facility-Administered Medications   Medication Dose Route Frequency Provider Last Rate Last Admin    dextrose 10% infusion   Intravenous Continuous PRN Deepali Negrete MD        dextrose 5% infusion   Intravenous Continuous Deepali Negrete  mL/hr at 03/15/25 1107 New Bag at 03/15/25 1107       ALLERGIES:    Allergies   Allergen Reactions    Ibuprofen Nausea and Vomiting    Shrimp     Sulfa Antibiotics Rash     Patient started on bactrim 7/24/24 tolerating medication without rash on 7/25        REVIEW OF SYSTEMS:  A comprehensive of systems was negative except as noted above.    SOCIAL HISTORY:   Social History     Socioeconomic History    Marital status: Single     Spouse name: Not on file    Number of children: Not on file    Years of education: Not on file    Highest education level: Not on file   Occupational History    Not on file   Tobacco Use    Smoking status: Never    Smokeless tobacco: Never   Substance and Sexual Activity    Alcohol use: Not Currently    Drug use: No    Sexual activity: Not on file   Other Topics Concern    Parent/sibling w/ CABG, MI or angioplasty before 65F 55M? Not Asked   Social History  Narrative    Not on file     Social Drivers of Health     Financial Resource Strain: Unknown (3/12/2025)    Financial Resource Strain     Within the past 12 months, have you or your family members you live with been unable to get utilities (heat, electricity) when it was really needed?: Patient unable to answer   Food Insecurity: Unknown (3/12/2025)    Food Insecurity     Within the past 12 months, did you worry that your food would run out before you got money to buy more?: Patient unable to answer     Within the past 12 months, did the food you bought just not last and you didn t have money to get more?: Patient unable to answer   Transportation Needs: Unknown (3/12/2025)    Transportation Needs     Within the past 12 months, has lack of transportation kept you from medical appointments, getting your medicines, non-medical meetings or appointments, work, or from getting things that you need?: Patient unable to answer   Physical Activity: Not on file   Stress: Not on file   Social Connections: Not on file   Interpersonal Safety: Unknown (3/12/2025)    Interpersonal Safety     Do you feel physically and emotionally safe where you currently live?: Patient unable to answer     Within the past 12 months, have you been hit, slapped, kicked or otherwise physically hurt by someone?: Patient unable to answer     Within the past 12 months, have you been humiliated or emotionally abused in other ways by your partner or ex-partner?: Patient unable to answer   Housing Stability: Unknown (3/12/2025)    Housing Stability     Do you have housing? : Patient unable to answer     Are you worried about losing your housing?: Patient unable to answer       FAMILY MEDICAL HISTORY:   Family History   Problem Relation Age of Onset    Heart Disease Father     Heart Failure Father     No Known Problems Brother     No Known Problems Brother     No Known Problems Brother     Pancreatitis Brother     Hypertension Sister     Peripheral Vascular  Disease Sister     No Known Problems Sister     No Known Problems Son     No Known Problems Daughter        PHYSICAL EXAM:   Temp  Av  F (36.1  C)  Min: 93  F (33.9  C)  Max: 98.7  F (37.1  C)      Pulse  Av.7  Min: 52  Max: 111 Resp  Av.4  Min: 13  Max: 20  SpO2  Av.9 %  Min: 91 %  Max: 100 %       /74 (BP Location: Left arm)   Pulse 71   Temp 96.9  F (36.1  C) (Temporal)   Resp 18   Wt 72.4 kg (159 lb 9.8 oz)   SpO2 95%   BMI 28.27 kg/m     Date 25 0700 - 25 0659   Shift 4748-9398 8042-5539 7717-7256 24 Hour Total   INTAKE   P.O. 470   470   Colloid 500   500   Shift Total(mL/kg) 970(15.85)   970(15.85)   OUTPUT   Shift Total(mL/kg)       Weight (kg) 61.2 61.2 61.2 61.2      Admit Weight: 61.2 kg (134 lb 14.7 oz)     General Appearance: Ill-looking pt, cachectic.  Chest: Right chest port catheter in place.  Respiratory: On room air. Lungs are clear to auscultation bilaterally.    Cardiovascular: Regular rate and rhythm.    GI: Normal bowel sounds, Soft, nontender, nondistended.  Suprapubic catheter in place draining scant amount of urine.  Extremities: Bilateral lower extremity edema.   Neurology: Alert to self, Moving all extremities    LABS:   CMP  Recent Labs   Lab 03/15/25  1250 03/15/25  1023 03/15/25  1005 03/15/25  0554 25  2131 25  0521 25  0652 25  0534   NA  --   --   --  149*  --  143 141 141   POTASSIUM  --   --   --  3.1* 5.5* 3.2* 3.4 3.6   CHLORIDE  --   --   --  122*  --  110* 110* 110*   CO2  --   --   --  15*  --  21* 22 22   ANIONGAP  --   --   --  12  --  12 9 9   GLC 98 71 63* 50*  --  75 82 86   BUN  --   --   --  24.2*  --  31.8* 32.5* 31.7*   CR  --   --   --  0.91  --  1.39* 1.50* 1.51*   GFRESTIMATED  --   --   --  67  --  40* 37* 37*   SAMUEL  --   --   --  6.9*  --  9.2 8.7* 8.2*   MAG  --   --   --  1.6*  --  2.2 2.2 2.2   PHOS  --   --   --  2.1*  --  2.4* 2.9 3.5   PROTTOTAL  --   --   --  3.9*  --  5.4* 4.6* 4.6*    ALBUMIN  --   --   --  2.8*  --  4.1 3.2* 2.3*   BILITOTAL  --   --   --  0.6  --  0.9 0.5 0.5   ALKPHOS  --   --   --  102  --  146 114 167*   AST  --   --   --  16  --  26 19 24   ALT  --   --   --  11  --  15 16 26     CBC  Recent Labs   Lab 03/15/25  0554 03/14/25  0521 03/13/25  1722 03/13/25  0810 03/11/25  0507   HGB 7.4* 7.3* 7.3* 5.6* 8.2*   WBC 7.0 6.7  --  5.8 8.1   RBC 2.37* 2.39*  --  1.83* 2.63*   HCT 21.9* 21.8*  --  17.3* 24.8*   MCV 92 91  --  95 94   MCH 31.2 30.5  --  30.6 31.2   MCHC 33.8 33.5  --  32.4 33.1   RDW 25.3* 23.9*  --  26.5* 25.4*   PLT 66* 66*  --  74* 148*     INRNo lab results found in last 7 days.  ABGNo lab results found in last 7 days.   URINE STUDIES  Recent Labs   Lab Test 03/11/25  1445 03/07/25  0832 03/04/25  2030 02/23/25  1728 07/21/24  1705 04/11/24  1152 05/23/23  0820 02/15/23  0927   COLOR Orange* Orange* Yellow Dark Brown*   < > Yellow   < > Yellow   APPEARANCE Cloudy* Cloudy* Slightly Cloudy* Cloudy*   < > Clear   < > Clear   URINEGLC Negative Negative Negative Negative   < > Negative   < > Negative   URINEBILI Negative Negative Negative Negative   < > Negative   < > Negative   URINEKETONE Negative Trace* Negative Negative   < > Negative   < > Negative   SG 1.020 1.026 1.014 1.021   < > 1.010   < > 1.015   UBLD Large* Large* Large* Large*   < > Moderate*   < > Large*   URINEPH 5.5 5.5 5.5 5.5   < > 7.0   < > 7.0   PROTEIN 200* 200* 50* 100*   < > Trace*   < > Negative   UROBILINOGEN  --   --   --   --   --  1.0  --  1.0   NITRITE Negative Negative Negative Negative   < > Positive*   < > Positive*   LEUKEST Large* Large* Large* Large*   < > Large*   < > Small*   RBCU >182* 61* >182* >182*   < > 10-25*   < > 25-50*   WBCU >182* >182* >182* >182*   < > >100*   < > 10-25*    < > = values in this interval not displayed.     No lab results found.  PTH  No lab results found.  IRON STUDIES  Recent Labs   Lab Test 03/15/25  0554 02/15/25  0836 09/24/24  1330   IRON 9* 17*  22*   FEB  --  40* 100*   IRONSAT  --  43 22   CASSANDRA 281 270 450*       IMAGING:  The radiologist's report which is filed    Navneet Sims MD

## 2025-03-15 NOTE — PROGRESS NOTES
Calorie Count  Intake recorded for: 3/14  Total Kcals: 0 Total Protein: 0g  Kcals from Hospital Food: 0   Protein: 0g  Kcals from Outside Food (average):0 Protein: 0g  # Meals Ordered from Kitchen: 0 meals ordered  # Meals Recorded: 0 meals recorded  # Supplements Recorded: none recorded

## 2025-03-15 NOTE — PLAN OF CARE
Goal Outcome Evaluation: 8520-5115      Plan of Care Reviewed With: patient    Overall Patient Progress: decliningOverall Patient Progress: declining    Outcome Evaluation: minimal oral intake, poor urine output, new facial edema    A&O X2. Afebrile, AM VSS on RA, refused afternoon and evening vitals. Assist of 2 with lift to get up, patient spent shift in bed, refusing repositioning, RN educated patient on importance to prevent further skin breakdown. New facial edema present, MD updated and aware. Abdominal pain present, PRN oxy given X1 with some relief. Denies nausea/vomiting or shortness of breath. Regular diet, refused oral intake other than water with medications, blood sugar checks Q4H. Suprapubic catheter in place, low urine output, MD aware, concerns of kinked tube/positioning rather than kidney function, however, CMP and electrolytes added to labs, awaiting results. Last BM 3/13. SL port infusing D5 @ 100 mL/hr. UTV wounds due to patient refusal.  Continue with current plan of care.

## 2025-03-15 NOTE — PLAN OF CARE
Goal Outcome Evaluation:      Plan of Care Reviewed With: patient    Overall Patient Progress: no changeOverall Patient Progress: no change    Outcome Evaluation: No acute changes this shift    1900- 0700     BP (!) 120/99 (BP Location: Left arm)   Pulse 73   Temp 96.9  F (36.1  C) (Temporal)   Resp 20   Wt 72.4 kg (159 lb 9.8 oz)   SpO2 97%   BMI 28.27 kg/m       Reason for admission: 3/4 from home with increased confusion, poor appetite and increased generalized weakness.   Activity: AX2 with lift  Pain: Managed by x3 prn iv dilaudid  Neuro: Oriented to self  Cardiac: WDL  Respiratory: RA, no s/s of distress  GI/: Denies nausea, voiding via suprapubic catheter. X1 large bm  Diet: Regular  Lines: Port infusing tko  Wounds: Sacral, perineal skin tears. Skin weeping from ble edema  Labs/imaging: Reviewed. Potassium recheck 5.5.       Pt repositioned appropriately, sometimes declined. Trying to pull out catheter during repositioning and verbally abusive when getting cares. Declining PO  meds     Plan: Discharge when medically ready      Continue to monitor and follow POC

## 2025-03-15 NOTE — PROVIDER NOTIFICATION
Provider Notification    Re: Worsening facial edema    Action: MD Deepali Negrete paged via [employer] text at [phone]    Response: Encouraged RN to reposition patient, RN notified MD that nursing staff have been trying hourly, but patient is not cooperative with repositioning, no response.

## 2025-03-15 NOTE — PROGRESS NOTES
"[facility]    Medicine Progress Note - Hospitalist Service, GOLD TEAM 8    Date of Admission:  3/4/2025    Assessment & Plan   \"Aranza\" Alis Hartman is a 69 year old woman admitted on 2/5/2025. 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  Hospital course complicated by MADHU (improving) and hypervolemia, acute on chronic pain, difficult neurocognitive status/likely dementia and psychiatric history, decreased oral intake/malnutrition and ongoing discussion of goals of care.  Unable to place feeding tube due to patient's disorientation and position.  Current medical plan is to continue managing pain and schizophrenia, and reevaluate goals of care in a week.  Ethics is consulted to assist with conversations around goals of care given patient's family request.     Today:   -Start D5W for hypernatremia  -Hypoglycemia, resolved after juice.  Monitor blood glucose every 4 hours for today.  -Continue pain management with Butrans, oxycodone, Dilaudid  -RN CC/social work will assist with setting up care conference mid next week  -RN to replete potassium      Decreased oral intake/Refusal to eat   Hypoglycemia 3/15, resolved  Severe Protein-Calorie Malnutrition   Hypernatremia, likely secondary to decreased oral intake  Hypokalemia, likely secondary to decreased oral intake  Patient has been refusing to eat, has not had good nutrition for several days.  Calorie counts 3/11 to 3/15 showed 0 intake.  Likely secondary to difficult to manage psychiatric history, likely component of " dementia.    - RN electrolyte replacement protocol ordered  - Monitor blood glucose every 4 hours for the next 24 hours  - Nutrition consulted, appreciate recommendations.    - Attempted XR feeding tube placement 3/14. unable to be placed due to patient disorientation and difficulty laying flat (please see radiology notes for details).    Delirium vs Metabolic Encephalopathy vs Unspecified neurocognitive Disorder: MOCA 25/30 9/22/22 vs. Schizophrenia, likely all contributing  RAFAEL   On admission patient's family was concerned about her confusion. Patient has paranoia with suspected hallucinations. Acute worsening was likely related to possible UTI v/s dehydration lack of PO. B12 FOLATE, ammonia all normal. Initially considered CT head but patient is unable to lie flat.  - Psychiatry consulted, saw patient 3/11, patient denied depression, hallucinations and psych could not elicit any paranoia symptoms.    -Zyprexa 2.5 mg twice daily started 01/13  - Delirium precautions  - Pain management per below    Acute on Chronic Pain  Thigh, low back pain related to her coccyx. Usually hunched over, screaming in the room.  - Oxycodone 10 mg q3h prn tablet or solution  - IV dilaudid 0.5 mg Q2H prn for breakthrough pain   - Lidocaine patch  - Buprenorphine patch started 3/13  -Scheduled Tylenol    Acute on chronic anemia likely secondary to chronic disease, stable  Thrombocytopenia, stable  Hemoglobin 5.6 03/13 morning, has been 7-8.  No obvious bleeding.  Likely due to malnutrition and acute illness.  Improved with 1 unit blood  Workup 3/15: Normal B12, normal ferritin, low iron, normal folate, unable to calculate TIBC  - Monitor on CBC  - Prophylactic Lovenox discontinued    MADHU, oliguric, likely secondary to poor oral intake/malnutrition, resolved 3/15  Anasarca  Creatinine at baseline 0.8-0.9 and admission. Trended up from 3/06, then peaked at 1.29 on 2/08, trended down and starting to trend up again 3/10.  Elevated urine  protein creatinine ratio and urine albumin. UA 3/11: 200 albumin, large blood, large leukocyte Estrace, greater than 182 WBC, moderate bacteria, many yeast, hyaline casts.  Other differentials considered include deconditioning causing ischemic ATN, calcium oxalate nephropathy in the setting of gastric bypass surgery.  Resolved with 100 g of 25% albumin on 3/12 and 3/13.  Renal US normal  -Nephrology consulted, Appreciate recommendations.  -Lymphedema consulted, appreciate assistance.  Wraps placed.  -Strict I's and O's, daily weights  - Suprapubic catheter in place  - Monitor on BMP     - RLE ultrasound ordered 3/14 due to mild discrepancy in size however unable to be performed since patient cannot lay flat.     Goals of care   Advanced care planning Discussion  Recurrent hospitalizations with progressive step-wise decline  -3/14 : Updated patient's daughter with palliative care (please see their notes for further details).  Informed her that we were unable to place the feeding tube due to patient disorientation and positioning.  We will continue a time-limited trial of 1 week on the psych meds and plan for a care conference mid next week to reevaluate.  CODE STATUS remains full code.  We did emphasize to patient's family that we recommend hospice/comfort cares, and if patient continues to be disoriented/unable to consent to enteric tube, would recommend not placing tube.  - 3/13: Planned care conference today with oncology, palliative care.  Called patient's daughter the day prior and the morning of to confirm time.  Patient's daughter did not show up until about 1 PM.  Care conference had to be rescheduled.  Oncology was able to connect with the familyafter showing up.  Please see their documentation for full conversation.  Subsequently patient's daughter elected to keep patient full code and will like enteric tube placed for nutrition.  Attempted to see daughter at bedside later, she had left the hospital.  I  called her to discuss the nutrition component further with her.  We discussed that we will give the patient the opportunity to get nutrition through the tube for a maximum of 7 days and reevaluate. I also did inform her that if the patient refuses to have the tube placed or appears uncomfortable or pulls the tube we will not force her or replace the tube.  She expressed understanding.  I also did inform her that ethics is consulted to assist with conversations around her mother's care.  - 03/12: Extensive discussion with the patient's daughter regarding overall clinical condition and goals of care.  I did inform the daughter that her mother has been refusing all medications, food, cares, while inpatient.  Daughter expressed that she was worried that maybe her mom was paranoid.  I did inform her that she was seen by psychiatry who did not elicit any paranoia symptoms, however due to recommend treating schizophrenia with Zyprexa if family desires.  She had not made a decision about it.  She had questions about feeding tube, I did inform her that her mother is unable to lay down and is usually slumped over in a feeding tube may not be feasible, and having extra tube can make her delirium/mental health worse.  She expressed understanding, and is willing to come in 03/13 at 12 PM for Shanti discussion of goals of care, CODE STATUS with the primary team and palliative care.    Sacral pressure ulcer  Lower extremity sores  - WOC consulted    --------------------------------------------------------------   Afib   Rate controlled   Prior admission had risk/benefit discussion and discontinued AC due to vaginal bleeding.     Serous endometrial carcinoma  Follows w/ heme/onc through [place]. S/p SNEHA, BSO 07/2024 s/p cycle 3 carbo/taxel 10/15/2024, cycle 4 delayed in s/o recent admission, family ultimately decided to forgo any further treatment.   During care conference, it was expressed that, her cancer is likely to recur due  to it being an aggressive type but when it will recur is unknown. However, should it recur, GynOnc team expressed that, because of her functional status (significant weakness) and malnutrition, treatment would not be recommended since that would worsen her health overall and benefits would not outweigh the risks.    Pyuria   Hx of ESBL urosepsis and bacteremia  Bladder dysfunction s/p cystostomy and suprapubic catheter  Recently admitted 11/26 - 12/8/2024 for ESBL urosepsis and bacteremia requiring ICU w/ L nephrostomy tube (removed 1/7) treated w/ ertapenem, returned 1/25-1/27 for concern for UTI but no clear infection at that time.  ID was consulted that admission and recommended avoiding frequent UA and Ucx checks in future unless patient w/ symptoms of UTI. This admission increased confusion, poor appetite and increased generalized weakness, foul smelling urine. CT abdomen unremarkable. Not septic on presentation. U/A showed growing [id number] mixed elisa. U/A collected from exchanged kent in the ED 3/4. S/p unasyn (3/4-3/5). S/p zosyn (3/5-3/6).                Diet: Combination Diet Regular Diet Adult  Snacks/Supplements Adult: Ensure Enlive; With Meals  Snacks/Supplements Adult: Boost Soothe; Between Meals  Adult Formula Drip Feeding: Continuous Jevity 1.5; Nasogastric tube; Goal Rate: Once FT placed/placement confirmed and provider approves TF start, start @ 10 mL/hr and advance by 10 mL q12h as tolerated to goal rate 50 mL/hr; mL/hr; Do not advance...    DVT Prophylaxis: None due to recent acute GI bleed.  Anticipate resuming tomorrow.  Kent Catheter: Not present  Lines: PRESENT      Port a Cath 02/05/25 Single Lumen Right Chest wall-Site Assessment: WDL      Cardiac Monitoring: None  Code Status: Full Code      Clinically Significant Risk Factors        # Hypokalemia: Lowest K = 3.2 mmol/L in last 2 days, will replace as needed  # Hyperkalemia: Highest K = 5.5 mmol/L in last 2 days, will monitor as  appropriate   # Hyperchloremia: Highest Cl = 110 mmol/L in last 2 days, will monitor as appropriate          # Hypoalbuminemia: Lowest albumin = 2.3 g/dL at 3/12/2025  5:34 AM, will monitor as appropriate   # Thrombocytopenia: Lowest platelets = 66 in last 2 days, will monitor for bleeding  # Acute Kidney Injury, unspecified: based on a >150% or 0.3 mg/dL increase in last creatinine compared to past 90 day average, will monitor renal function  # Hypertension: Noted on problem list             # Severe Malnutrition: based on nutrition assessment and treatment provided per dietitian's recommendations.    # Financial/Environmental Concerns: none         Social Drivers of Health    Food Insecurity: Unknown (3/12/2025)    Food Insecurity     Within the past 12 months, did you worry that your food would run out before you got money to buy more?: Patient unable to answer     Within the past 12 months, did the food you bought just not last and you didn t have money to get more?: Patient unable to answer   Housing Stability: Unknown (3/12/2025)    Housing Stability     Do you have housing? : Patient unable to answer     Are you worried about losing your housing?: Patient unable to answer   Financial Resource Strain: Unknown (3/12/2025)    Financial Resource Strain     Within the past 12 months, have you or your family members you live with been unable to get utilities (heat, electricity) when it was really needed?: Patient unable to answer   Transportation Needs: Unknown (3/12/2025)    Transportation Needs     Within the past 12 months, has lack of transportation kept you from medical appointments, getting your medicines, non-medical meetings or appointments, work, or from getting things that you need?: Patient unable to answer   Interpersonal Safety: Unknown (3/12/2025)    Interpersonal Safety     Do you feel physically and emotionally safe where you currently live?: Patient unable to answer     Within the past 12 months,  have you been hit, slapped, kicked or otherwise physically hurt by someone?: Patient unable to answer     Within the past 12 months, have you been humiliated or emotionally abused in other ways by your partner or ex-partner?: Patient unable to answer          Disposition Plan     Medically Ready for Discharge:              Deepali Negrete MD  Hospitalist Service, GOLD TEAM [address] [place] [address]  Securely message with You[employer] (more info)  Text page via [facility] Paging/Directory   See signed in provider for up to date coverage information  ______________________________________________________________________    Interval History   Patient continues to refuse all cares, medications.  This morning she reports ongoing pain.  Unable to answer further questions.    Physical Exam   Vital Signs:     BP: (!) 120/99       SpO2: 97 % O2 Device: None (Room air)    Weight: 159 lbs 9.81 oz    General Appearance:  Awake, Alert, Oriented to self and location, cachectic  HEENT: Swollen lower right eye lid and right side of face   Chest: Cight chest port catheter in place  Respiratory: Breathing comfortably on room air. Lungs are clear to auscultation bilaterally.    Cardiovascular: Regular rate and rhythm, extremities perfused.    GI: Normal bowel sounds, Soft, nontender, nondistended.  Suprapubic catheter in place draining scant amount of urine  Extremities: Bilateral lower extremity edema, multiple lower extremity and digit wounds  Neurology:  moving all extremities appropriately     Medical Decision Making             Data

## 2025-03-15 NOTE — PROVIDER NOTIFICATION
"Provider Notification    Re: low urine output    Action: MD Deepali Negrete paged via [facility] text at [phone]    Response: \"okay thanks\", no new orders placed.    "

## 2025-03-16 ENCOUNTER — APPOINTMENT (OUTPATIENT)
Dept: OCCUPATIONAL THERAPY | Facility: CLINIC | Age: 70
End: 2025-03-16
Payer: COMMERCIAL

## 2025-03-16 LAB
ALBUMIN SERPL BCG-MCNC: 3.1 G/DL (ref 3.5–5.2)
ALP SERPL-CCNC: 122 U/L (ref 40–150)
ALT SERPL W P-5'-P-CCNC: 14 U/L (ref 0–50)
ANION GAP SERPL CALCULATED.3IONS-SCNC: 11 MMOL/L (ref 7–15)
ANION GAP SERPL CALCULATED.3IONS-SCNC: 9 MMOL/L (ref 7–15)
AST SERPL W P-5'-P-CCNC: 21 U/L (ref 0–45)
BASOPHILS # BLD AUTO: 0 10E3/UL (ref 0–0.2)
BASOPHILS NFR BLD AUTO: 0 %
BILIRUB SERPL-MCNC: 0.7 MG/DL
BUN SERPL-MCNC: 25.4 MG/DL (ref 8–23)
BUN SERPL-MCNC: 30.1 MG/DL (ref 8–23)
CALCIUM SERPL-MCNC: 8.5 MG/DL (ref 8.8–10.4)
CALCIUM SERPL-MCNC: 9.1 MG/DL (ref 8.8–10.4)
CHLORIDE SERPL-SCNC: 109 MMOL/L (ref 98–107)
CHLORIDE SERPL-SCNC: 113 MMOL/L (ref 98–107)
CREAT SERPL-MCNC: 0.95 MG/DL (ref 0.51–0.95)
CREAT SERPL-MCNC: 1.11 MG/DL (ref 0.51–0.95)
CRP SERPL-MCNC: 72.8 MG/L
CRP SERPL-MCNC: 88.28 MG/L
EGFRCR SERPLBLD CKD-EPI 2021: 53 ML/MIN/1.73M2
EGFRCR SERPLBLD CKD-EPI 2021: 64 ML/MIN/1.73M2
EOSINOPHIL # BLD AUTO: 0 10E3/UL (ref 0–0.7)
EOSINOPHIL NFR BLD AUTO: 0 %
ERYTHROCYTE [DISTWIDTH] IN BLOOD BY AUTOMATED COUNT: 24.9 % (ref 10–15)
GLUCOSE BLDC GLUCOMTR-MCNC: 106 MG/DL (ref 70–99)
GLUCOSE BLDC GLUCOMTR-MCNC: 117 MG/DL (ref 70–99)
GLUCOSE BLDC GLUCOMTR-MCNC: 64 MG/DL (ref 70–99)
GLUCOSE BLDC GLUCOMTR-MCNC: 84 MG/DL (ref 70–99)
GLUCOSE BLDC GLUCOMTR-MCNC: 87 MG/DL (ref 70–99)
GLUCOSE BLDC GLUCOMTR-MCNC: 89 MG/DL (ref 70–99)
GLUCOSE BLDC GLUCOMTR-MCNC: 97 MG/DL (ref 70–99)
GLUCOSE BLDC GLUCOMTR-MCNC: 99 MG/DL (ref 70–99)
GLUCOSE SERPL-MCNC: 102 MG/DL (ref 70–99)
GLUCOSE SERPL-MCNC: 197 MG/DL (ref 70–99)
HCO3 SERPL-SCNC: 19 MMOL/L (ref 22–29)
HCO3 SERPL-SCNC: 21 MMOL/L (ref 22–29)
HCT VFR BLD AUTO: 26.6 % (ref 35–47)
HGB BLD-MCNC: 8.8 G/DL (ref 11.7–15.7)
IMM GRANULOCYTES # BLD: 0 10E3/UL
IMM GRANULOCYTES NFR BLD: 0 %
LYMPHOCYTES # BLD AUTO: 1.2 10E3/UL (ref 0.8–5.3)
LYMPHOCYTES NFR BLD AUTO: 19 %
MAGNESIUM SERPL-MCNC: 1.9 MG/DL (ref 1.7–2.3)
MCH RBC QN AUTO: 30.9 PG (ref 26.5–33)
MCHC RBC AUTO-ENTMCNC: 33.1 G/DL (ref 31.5–36.5)
MCV RBC AUTO: 93 FL (ref 78–100)
MONOCYTES # BLD AUTO: 0.4 10E3/UL (ref 0–1.3)
MONOCYTES NFR BLD AUTO: 7 %
NEUTROPHILS # BLD AUTO: 4.8 10E3/UL (ref 1.6–8.3)
NEUTROPHILS NFR BLD AUTO: 74 %
NRBC # BLD AUTO: 0 10E3/UL
NRBC BLD AUTO-RTO: 0 /100
PHOSPHATE SERPL-MCNC: 2.3 MG/DL (ref 2.5–4.5)
PLATELET # BLD AUTO: 63 10E3/UL (ref 150–450)
POTASSIUM SERPL-SCNC: 4.2 MMOL/L (ref 3.4–5.3)
POTASSIUM SERPL-SCNC: 4.3 MMOL/L (ref 3.4–5.3)
PROT SERPL-MCNC: 4.7 G/DL (ref 6.4–8.3)
RBC # BLD AUTO: 2.85 10E6/UL (ref 3.8–5.2)
SODIUM SERPL-SCNC: 139 MMOL/L (ref 135–145)
SODIUM SERPL-SCNC: 140 MMOL/L (ref 135–145)
SODIUM SERPL-SCNC: 141 MMOL/L (ref 135–145)
SODIUM SERPL-SCNC: 141 MMOL/L (ref 135–145)
SODIUM SERPL-SCNC: 143 MMOL/L (ref 135–145)
WBC # BLD AUTO: 6.5 10E3/UL (ref 4–11)

## 2025-03-16 PROCEDURE — 120N000002 HC R&B MED SURG/OB UMMC

## 2025-03-16 PROCEDURE — 86140 C-REACTIVE PROTEIN: CPT | Performed by: STUDENT IN AN ORGANIZED HEALTH CARE EDUCATION/TRAINING PROGRAM

## 2025-03-16 PROCEDURE — 97535 SELF CARE MNGMENT TRAINING: CPT | Mod: GO | Performed by: OCCUPATIONAL THERAPIST

## 2025-03-16 PROCEDURE — 84520 ASSAY OF UREA NITROGEN: CPT

## 2025-03-16 PROCEDURE — 85025 COMPLETE CBC W/AUTO DIFF WBC: CPT

## 2025-03-16 PROCEDURE — 258N000001 HC RX 258: Performed by: STUDENT IN AN ORGANIZED HEALTH CARE EDUCATION/TRAINING PROGRAM

## 2025-03-16 PROCEDURE — 84295 ASSAY OF SERUM SODIUM: CPT | Performed by: STUDENT IN AN ORGANIZED HEALTH CARE EDUCATION/TRAINING PROGRAM

## 2025-03-16 PROCEDURE — 80053 COMPREHEN METABOLIC PANEL: CPT

## 2025-03-16 PROCEDURE — 51705 CHANGE OF BLADDER TUBE: CPT | Mod: 4UV | Performed by: UROLOGY

## 2025-03-16 PROCEDURE — 250N000013 HC RX MED GY IP 250 OP 250 PS 637: Performed by: INTERNAL MEDICINE

## 2025-03-16 PROCEDURE — 99222 1ST HOSP IP/OBS MODERATE 55: CPT | Mod: 4UV | Performed by: UROLOGY

## 2025-03-16 PROCEDURE — 258N000003 HC RX IP 258 OP 636: Performed by: STUDENT IN AN ORGANIZED HEALTH CARE EDUCATION/TRAINING PROGRAM

## 2025-03-16 PROCEDURE — 250N000011 HC RX IP 250 OP 636: Performed by: STUDENT IN AN ORGANIZED HEALTH CARE EDUCATION/TRAINING PROGRAM

## 2025-03-16 PROCEDURE — 250N000009 HC RX 250: Performed by: STUDENT IN AN ORGANIZED HEALTH CARE EDUCATION/TRAINING PROGRAM

## 2025-03-16 PROCEDURE — 84100 ASSAY OF PHOSPHORUS: CPT | Performed by: STUDENT IN AN ORGANIZED HEALTH CARE EDUCATION/TRAINING PROGRAM

## 2025-03-16 PROCEDURE — 99233 SBSQ HOSP IP/OBS HIGH 50: CPT | Performed by: STUDENT IN AN ORGANIZED HEALTH CARE EDUCATION/TRAINING PROGRAM

## 2025-03-16 PROCEDURE — 250N000013 HC RX MED GY IP 250 OP 250 PS 637: Performed by: STUDENT IN AN ORGANIZED HEALTH CARE EDUCATION/TRAINING PROGRAM

## 2025-03-16 PROCEDURE — 83735 ASSAY OF MAGNESIUM: CPT | Performed by: STUDENT IN AN ORGANIZED HEALTH CARE EDUCATION/TRAINING PROGRAM

## 2025-03-16 PROCEDURE — 99232 SBSQ HOSP IP/OBS MODERATE 35: CPT | Mod: GC | Performed by: INTERNAL MEDICINE

## 2025-03-16 RX ORDER — MAGNESIUM OXIDE 400 MG/1
400 TABLET ORAL EVERY 4 HOURS
Status: COMPLETED | OUTPATIENT
Start: 2025-03-16 | End: 2025-03-16

## 2025-03-16 RX ORDER — ACETAMINOPHEN 325 MG/10.15ML
975 LIQUID ORAL EVERY 8 HOURS
Status: DISCONTINUED | OUTPATIENT
Start: 2025-03-16 | End: 2025-03-18

## 2025-03-16 RX ADMIN — ACETAMINOPHEN 975 MG: 325 TABLET, FILM COATED ORAL at 00:24

## 2025-03-16 RX ADMIN — OXYCODONE HYDROCHLORIDE 10 MG: 5 SOLUTION ORAL at 23:54

## 2025-03-16 RX ADMIN — Medication 2.5 MG: at 09:29

## 2025-03-16 RX ADMIN — POTASSIUM & SODIUM PHOSPHATES POWDER PACK 280-160-250 MG 1 PACKET: 280-160-250 PACK at 19:33

## 2025-03-16 RX ADMIN — Medication 2.5 MG: at 19:33

## 2025-03-16 RX ADMIN — Medication 1 SPRAY: at 16:28

## 2025-03-16 RX ADMIN — MAGNESIUM OXIDE TAB 400 MG (241.3 MG ELEMENTAL MG) 400 MG: 400 (241.3 MG) TAB at 14:27

## 2025-03-16 RX ADMIN — MAGNESIUM OXIDE TAB 400 MG (241.3 MG ELEMENTAL MG) 400 MG: 400 (241.3 MG) TAB at 10:46

## 2025-03-16 RX ADMIN — Medication 1 SPRAY: at 19:34

## 2025-03-16 RX ADMIN — ACETAMINOPHEN 975 MG: 160 SOLUTION ORAL at 09:30

## 2025-03-16 RX ADMIN — OXYCODONE HYDROCHLORIDE 10 MG: 5 SOLUTION ORAL at 13:29

## 2025-03-16 RX ADMIN — Medication 5 ML: at 10:49

## 2025-03-16 RX ADMIN — POTASSIUM & SODIUM PHOSPHATES POWDER PACK 280-160-250 MG 1 PACKET: 280-160-250 PACK at 10:46

## 2025-03-16 RX ADMIN — THIAMINE HCL TAB 100 MG 100 MG: 100 TAB at 09:29

## 2025-03-16 RX ADMIN — LIDOCAINE 4% 1 PATCH: 40 PATCH TOPICAL at 09:29

## 2025-03-16 RX ADMIN — VASOPRESSIN: 20 INJECTION, SOLUTION INTRAVENOUS at 14:56

## 2025-03-16 RX ADMIN — ACETAMINOPHEN 975 MG: 160 SOLUTION ORAL at 23:53

## 2025-03-16 RX ADMIN — DEXTROSE MONOHYDRATE 500 ML: 100 INJECTION, SOLUTION INTRAVENOUS at 12:37

## 2025-03-16 RX ADMIN — ACETAMINOPHEN 975 MG: 160 SOLUTION ORAL at 16:28

## 2025-03-16 RX ADMIN — POTASSIUM & SODIUM PHOSPHATES POWDER PACK 280-160-250 MG 1 PACKET: 280-160-250 PACK at 14:27

## 2025-03-16 ASSESSMENT — ACTIVITIES OF DAILY LIVING (ADL)
ADLS_ACUITY_SCORE: 94
ADLS_ACUITY_SCORE: 92
ADLS_ACUITY_SCORE: 92
ADLS_ACUITY_SCORE: 94
ADLS_ACUITY_SCORE: 94
ADLS_ACUITY_SCORE: 96
ADLS_ACUITY_SCORE: 96
ADLS_ACUITY_SCORE: 92
ADLS_ACUITY_SCORE: 96
ADLS_ACUITY_SCORE: 92
ADLS_ACUITY_SCORE: 94
ADLS_ACUITY_SCORE: 92
ADLS_ACUITY_SCORE: 96
ADLS_ACUITY_SCORE: 96
ADLS_ACUITY_SCORE: 94
ADLS_ACUITY_SCORE: 96
ADLS_ACUITY_SCORE: 92
ADLS_ACUITY_SCORE: 96
ADLS_ACUITY_SCORE: 92
ADLS_ACUITY_SCORE: 96
ADLS_ACUITY_SCORE: 92
ADLS_ACUITY_SCORE: 96
ADLS_ACUITY_SCORE: 96

## 2025-03-16 NOTE — CONSULTS
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 An SPT was left in place post-op. At follow-up visit with Dr Monroy on 9/4/24, pt elected to continue with SPT because she felt it improved her QOL compared to her history of severe incontinence. She has been having monthly SPT exchanges by home health RNs without issues. In 11/2024 she was admitted for sepsis due to likely urinary source and had a left PNT placed. The PNT was subsequently removed by IR in 12/2024 after passing a clamp trial. Renal ultrasound after PNT removal on 2/7/25 without hydronephrosis. Most recent renal ultrasound on 3/8/25 without hydronephrosis.     She is admitted due to confusion, generalized weakness, and confusion and was found to have MADHU which has now resolved. Renal ultrasound this admission without hydronephrosis. She is afebrile and hemodynamically stable. Creatinine at baseline of approximately 0.9, no leukocytosis.    Urology is consulted due concern for suprapubic tube site infection and displacement of the suprapubic tube due to low UOP though recorded UOP trend stable over the course of days.           Past Medical History:     Past Medical History:   Diagnosis Date    Atrial fibrillation (H)     Depression      Hypertension     Malignant neoplasm of endocervix (H)     Tx with radiation    Near syncope 11/07/2024    Orthopnea 09/21/2024    Other chronic pain     Low back    Schizophrenia (H)     Urinary incontinence             Past Surgical History:     Past Surgical History:   Procedure Laterality Date    ABDOMINOPLASTY      BIOPSY CERVICAL, LOCAL EXCISION, SINGLE/MULTIPLE  10/26/2011    Procedure:BIOPSY CERVICAL, LOCAL EXCISION, SINGLE/MULTIPLE; EUA, cervical biopsies; Surgeon:BETTY TINEO; Location:[facility]    BIOPSY VAGINAL N/A 06/08/2021    Procedure: BIOPSY, VAGINA;  Surgeon: Dany Perez MD;  Location: [facility]    CYSTOSCOPY N/A 05/17/2024    Procedure: Cystoscopy;  Surgeon: Dany Perez MD;  [facility]: [facility]    CYSTOSTOMY, INSERT TUBE SUPRAPUBIC, COMBINED  07/12/2024    Procedure: Insertion of supra-pubic catheter;  Surgeon: Dany Perez MD;  [facility]: [facility]    DILATION AND CURETTAGE, WITH ULTRASOUND GUIDANCE N/A 05/17/2024    Procedure: Dilation and curettage of the uterus with drainage of uterine fluid under ultrasound guidance, lysis of vaginal adhesions;  Surgeon: Dany Perez MD;  [facility]    EXAM UNDER ANESTHESIA PELVIC N/A 03/12/2020    Procedure: Exam under anesthsia, vaginal biopsies, possible CO2 laser of the vagina;  Surgeon: Lilliam Roy MD;  [facility]    GI SURGERY  2004    gastric bypass    HYSTERECTOMY TOTAL ABDOMINAL, BILATERAL SALPINGO-OOPHORECTOMY, COMBINED Bilateral 07/12/2024    Procedure: Diagnostic laparoscopy converted to exploratory laparotomy, total abdominal hysterectomy, bilateral salpingo-oophorectomy, lysis of adhesions >60 min;  Surgeon: Dany Perez MD;  [facility]    INSERT PORT VASCULAR ACCESS N/A 08/26/2024    Procedure: Insert port vascular access;  Surgeon: Avery Gregory MD;  Location: [facility]    IR CHEST PORT PLACEMENT > 5 YRS OF AGE  08/26/2024    IR FOLLOW UP VISIT OUTPATIENT  1/7/2025    IR NEPHROSTOMY TUBE PLACEMENT LEFT   11/29/2024    LASER CO2 VAGINA N/A 03/02/2015    Procedure: LASER CO2 VAGINA;  Surgeon: Mariela Abdalla MD;  Location: [facility]    LASER CO2 VAGINA N/A 05/24/2019    Procedure: Exam under anesthesia, vaginal biopsies, CO2 laser of the vagina;  Surgeon: Lilliam Roy MD;  Location: [facility]    LASER CO2 VAGINA N/A 06/08/2021    Procedure: Exam under anesthesia, laser ablation of the upper vagina;  Surgeon: Dany Perez MD;  Location: [facility]    PICC DOUBLE LUMEN PLACEMENT Left 11/28/2024    left basilic 5 fr dl power picc 44 cm    REPAIR BLADDER N/A 07/12/2024    Procedure: Repair bladder;  Surgeon: Dany Perez MD;  Location: [facility]            Social History:     Social History     Tobacco Use    Smoking status: Never    Smokeless tobacco: Never   Substance Use Topics    Alcohol use: Not Currently            Family History:     Family History   Problem Relation Age of Onset    Heart Disease Father     Heart Failure Father     No Known Problems Brother     No Known Problems Brother     No Known Problems Brother     Pancreatitis Brother     Hypertension Sister     Peripheral Vascular Disease Sister     No Known Problems Sister     No Known Problems Son     No Known Problems Daughter             Allergies:     Allergies   Allergen Reactions    Ibuprofen Nausea and Vomiting    Shrimp     Sulfa Antibiotics Rash     Patient started on bactrim 7/24/24 tolerating medication without rash on 7/25             Medications:     Current Facility-Administered Medications   Medication Dose Route Frequency Provider Last Rate Last Admin    acetaminophen (TYLENOL) tablet 975 mg  975 mg Oral Q8H Salvador Dickerson MD   975 mg at 03/16/25 0024    albuterol (PROVENTIL HFA/VENTOLIN HFA) inhaler  1-2 puff Inhalation Q4H PRN Salvador Dickerson MD        artificial saliva (BIOTENE MT) solution 1 spray  1 spray Mouth/Throat 4x Daily Salvador Dickerson MD   1 spray at 03/15/25 2025    benzocaine-menthol (CHLORASEPTIC MAX) 15-10 MG  lozenge 1 lozenge  1 lozenge Buccal Q1H PRN Salvador Dickerson MD        bisacodyl (DULCOLAX) suppository 10 mg  10 mg Rectal Daily PRN Salvador Dickerson MD        buprenorphine (BUTRANS) 10 MCG/HR WK patch 1 patch  1 patch Transdermal Weekly Deepali Negrete MD   1 patch at 03/13/25 1303    buprenorphine (BUTRANS) Patch in Place   Transdermal Q8H Deepali Negrete MD        calcium carbonate (TUMS) chewable tablet 1,000 mg  1,000 mg Oral 4x Daily PRN Salvador Dickerson MD        dextrose 10% infusion   Intravenous Continuous PRN Deepali Negrete MD        [Held by provider] dextrose 5% infusion   Intravenous Continuous Sujit Limon PA-C 50 mL/hr at 03/15/25 2306 Rate Change at 03/15/25 2306    [Held by provider] enoxaparin ANTICOAGULANT (LOVENOX) injection 40 mg  40 mg Subcutaneous Q24H Salvador Dickerson MD   40 mg at 03/13/25 0819    guaiFENesin-dextromethorphan (ROBITUSSIN DM) 100-10 MG/5ML syrup 10 mL  10 mL Oral Q4H PRN Salvador Dickerson MD        heparin lock flush 10 unit/mL injection 5-10 mL  5-10 mL Intracatheter Q1H PRN Deepali Negrete MD        heparin lock flush 10 unit/mL injection 5-10 mL  5-10 mL Intracatheter Q24H Deepali Negrete MD   5 mL at 03/13/25 1428    heparin lock flush 100 unit/mL injection 5-10 mL  5-10 mL Intracatheter Q28 Days Deepali Negrete MD   5 mL at 03/13/25 1432    HYDROmorphone (PF) (DILAUDID) injection 0.5 mg  0.5 mg Intravenous Q2H PRN Veronica Kahn MD   0.5 mg at 03/15/25 0553    Lidocaine (LIDOCARE) 4 % Patch 1 patch  1 patch Transdermal Q24H Deepali Negrete MD   1 patch at 03/15/25 0843    lidocaine (LMX4) cream   Topical Q1H PRN Salvador Dickerson MD   Given at 03/08/25 0948    lidocaine 1 % 0.1-1 mL  0.1-1 mL Other Q1H PRN Salvador Dickerson MD        magic mouthwash suspension (diphenhydramine, lidocaine, aluminum-magnesium & simethicone)  10 mL Swish & Spit TID AC Salvador Dickerson MD   10 mL at 03/14/25 1107    melatonin tablet 1 mg  1 mg Oral At Bedtime PRN  Salvador Dickerson MD   1 mg at 03/08/25 0100    miconazole (MICATIN) 2 % powder   Topical BID PRN Salvador Dickerson MD        naloxone (NARCAN) injection 0.2 mg  0.2 mg Intravenous Q2 Min PRN Salvador Dickerson MD        Or    naloxone (NARCAN) injection 0.4 mg  0.4 mg Intravenous Q2 Min PRN Salvador Dickerson MD        Or    naloxone (NARCAN) injection 0.2 mg  0.2 mg Intramuscular Q2 Min PRN Salvador Dickerson MD        Or    naloxone (NARCAN) injection 0.4 mg  0.4 mg Intramuscular Q2 Min PRN Salvador Dickerson MD        OLANZapine zydis (zyPREXA) ODT half-tab 2.5 mg  2.5 mg Oral BID Deepali Negrete MD   2.5 mg at 03/15/25 2057    ondansetron (ZOFRAN ODT) ODT tab 4 mg  4 mg Oral Q6H PRN Salvador Dickerson MD        Or    ondansetron (ZOFRAN) injection 4 mg  4 mg Intravenous Q6H PRN Salvador Dickerson MD        oxyCODONE IR (ROXICODONE) tablet 10 mg  10 mg Oral Q3H PRN Deepali Negrete MD   10 mg at 03/15/25 0950    Or    oxyCODONE (ROXICODONE) solution 10 mg  10 mg Oral Q3H PRN Deepali Negrete MD   10 mg at 03/15/25 2017    polyethylene glycol (MIRALAX) Packet 17 g  17 g Oral Daily Piter Webber MD   17 g at 03/15/25 0843    prochlorperazine (COMPAZINE) injection 5 mg  5 mg Intravenous Q6H PRN Salvador Dickerson MD        Or    prochlorperazine (COMPAZINE) tablet 5 mg  5 mg Oral Q6H PRN Salvador Dickerson MD        senna-docusate (SENOKOT-S/PERICOLACE) 8.6-50 MG per tablet 1 tablet  1 tablet Oral At Bedtime Piter Webber MD   1 tablet at 03/15/25 2105    senna-docusate (SENOKOT-S/PERICOLACE) 8.6-50 MG per tablet 1 tablet  1 tablet Oral BID PRN Salvador Dickerson MD        Or    senna-docusate (SENOKOT-S/PERICOLACE) 8.6-50 MG per tablet 2 tablet  2 tablet Oral BID PRN Salvador Dickerson MD        sodium chloride (PF) 0.9% PF flush 10-20 mL  10-20 mL Intracatheter q1 min prn Deepali Negrete MD        sodium chloride (PF) 0.9% PF flush 10-20 mL  10-20 mL Intracatheter Q1H PRN Deepali Negrete MD         "sodium chloride (PF) 0.9% PF flush 10-20 mL  10-20 mL Intracatheter Q28 Days Deepali Negrete MD   10 mL at 03/13/25 1630    sodium chloride (PF) 0.9% PF flush 3 mL  3 mL Intracatheter Q8H Salvador Dickerson MD   3 mL at 03/16/25 0025    sodium chloride (PF) 0.9% PF flush 3 mL  3 mL Intracatheter q1 min prn Salvador Dickerson MD        thiamine (B-1) tablet 100 mg  100 mg Oral or Feeding Tube Daily Deepali Negrete MD   100 mg at 03/15/25 0843             Review of Systems:    ROS: See HPI for pertinent details.  Remainder of 10-point ROS negative.         Physical Exam:   VS:  T: 96.9    HR: 74    BP: 115/69    RR: 18   GEN:  NAD, leaning forward in bed, yelling out family members names  LUNGS: Non-labored breathing on room air, no use of accessory muscles of respiration   ABD:  soft, non-tender non-distended, suprapubic tube in place draining cloudy yellow urine, erythema surrounding SPT site around skin fold, skin moist  SKIN:  Warm.  Dry.  No rashes.          Data:   All laboratory data reviewed:    No results found for: \"PSA\"  Recent Labs   Lab 03/16/25  0502 03/15/25  2058 03/15/25  0554 03/14/25  0521   WBC 6.5 6.7 7.0 6.7   HGB 8.8* 8.0* 7.4* 7.3*   PLT 63* 68* 66* 66*       Recent Labs   Lab 03/16/25  0502 03/16/25  0453 03/16/25  0024 03/15/25  2027 03/15/25  1709 03/15/25  1553 03/15/25  1005 03/15/25  0554 03/14/25  2131 03/14/25  0521 03/13/25  0652     --  140  --   --  143  144  --  149*  --  143 141   POTASSIUM 4.2  --   --   --   --  4.3  4.3  --  3.1* 5.5* 3.2* 3.4   CHLORIDE 109*  --   --   --   --  113*  --  122*  --  110* 110*   CO2 21*  --   --   --   --  19*  --  15*  --  21* 22   BUN 25.4*  --   --   --   --  30.1*  --  24.2*  --  31.8* 32.5*   CR 0.95  --   --   --   --  1.11*  --  0.91  --  1.39* 1.50*   * 89  --  96 97 102*   < > 50*  --  75 82   SAMUEL  --   --   --   --   --  9.1  --  6.9*  --  9.2 8.7*   MAG 1.9  --   --   --   --  2.1  --  1.6*  --  2.2 2.2   PHOS 2.3*  " --   --   --   --  2.5  --  2.1*  --  2.4* 2.9    < > = values in this interval not displayed.       Recent Labs   Lab 03/11/25  1445   COLOR Orange*   APPEARANCE Cloudy*   URINEGLC Negative   URINEBILI Negative   URINEKETONE Negative   SG 1.020   URINEPH 5.5   PROTEIN 200*   NITRITE Negative   LEUKEST Large*   RBCU >182*   WBCU >182*     Results for orders placed or performed during the hospital encounter of 03/04/25   Urine Culture    Collection Time: 03/07/25  8:32 AM    Specimen: Urine, Suprapubic   Result Value Ref Range    Culture 10,000-50,000 CFU/mL Candida albicans (A)     Culture <1,000 CFU/mL Urogenital elisa    Urine Culture    Collection Time: 03/04/25  8:30 PM    Specimen: Urine, NOS   Result Value Ref Range    Culture 10,000-50,000 CFU/mL Mixture of Urogenital Elisa        All pertinent imaging reviewed:  Renal Ultrasound 3/8/25  IMPRESSION: Small bilateral kidneys without hydronephrosis.          Impression and Plan:   Impression / Plan:   Alis Hartman is a 69 year old female with pmh significant for HTN, atrial fibrillation (on eliquis), CKD IIIa, anemia, Hx Kiko-En-Y Gastric bypass, Hx cervical cancer (s/p radiation therapy) then serous endometrial adenocarcinoma of uterus s/p SNEHA BSO (7/12/2024), complicated by cystotomy for which Dr Monroy performed cystorrhaphy with omental flap. An SPT was left in place post-op. At follow-up visit with Dr Monroy on 9/4/24, pt elected to continue with SPT because she felt it improved her QOL compared to her history of severe incontinence. She has been having monthly SPT exchanges by home health RNs without issues. In 11/2024 she was admitted for sepsis due to likely urinary source and had a left PNT placed. The PNT was subsequently removed by IR in 12/2024 after passing a clamp trial. Renal ultrasound after PNT removal on 2/7/25 without hydronephrosis. Most recent renal ultrasound on 3/8/25 without hydronephrosis.     Urology consulted to assess SPT.  There is superficial erythema surrounding the SPT site. Per nursing it has been difficult to provide hygiene cares due to patients positioning and refusal. While at bedside I assisted nursing with catheter cares. There appears to be superficial skin irritation due to moisture, friction, and refusal of hygiene cares. There is no evidence of infection at this time. The skin was cleansed and a drain sponge applied around the tube to protect the skin and keep it dry.  She if afebrile and hemodynamically stable and labs are without leukocytosis which is further reassuring against infection.      The patient is due to SPT exchange on 3/23/25. I offered to exchange the SPT which the patient refused. I flushed the catheter to ensure it was in the appropriate position and draining adequately. Nursing and I agreed the patient was unable to tolerate catheter exchange at this time - urology will attempt to exchange the catheter this coming week. If the patient is amenable to catheter exchange during normal weekday hours please reach out to urology on call.     Plan  - Will plan to exchange catheter once patient is amenable  - Will have to consider whether continuing the SPT is medically indicated for the patient. At this time the SPT is improving the patient's quality of life and decreasing the degree of incontinence which could result in increased skin irritation and breakdown. However, if the patient refuses monthly exchanges, the SPT may become a significant infection risk at which time we will have to consider the risk/benefit ratio   - At this time catheter is appropriately positioned and draining appropriately   - Continue local hygiene cares as able, apply drain sponge or gauze around SPT to protect skin   - Urology will sign off at this time but will plan to perform SPT exchange this week - please reach out if patient amenable during business hours           Discussed with Dr. Jacobson    Thank you for the opportunity to  participate in the care of [facility] AMINAH [place].     Ella Linda MD  Urology Resident PGY-2

## 2025-03-16 NOTE — PLAN OF CARE
Goal Outcome Evaluation: [id number]      Plan of Care Reviewed With: patient    Overall Patient Progress: improvingOverall Patient Progress: improving    Outcome Evaluation: more cooperative and agreeable with cares today    A&O X2. Afebrile, VSS on RA. Assist of 2 with lift to get up, repositioning Q3H as patient allows.  Abdominal pain present, PRN oxy given X1 with some relief. Denies nausea/vomiting or shortness of breath. Regular diet, refused oral intake other than water with medications, blood sugar checks Q4H. Suprapubic catheter in place, improved urine output today, catheter cares done. 2 small soft BMs this shift. SL port infusing D5 & 1/2 NS @ 75 mL/hr. Wound cares done per orders. CHG bath completed with wipes.  Continue with current plan of care.

## 2025-03-16 NOTE — PROGRESS NOTES
Brief Internal Medicine Cross Cover Note:    # Concern for possible cellulitis surrounding suprapubic catheter   # Displacement of suprapubic catheter   Was called to bedside given family concern about patient and decreased urinary output. Patient postured leaning forward with face in pillow saying unintelligible words. This is not new behavior for her but family does believe she is leaning forward more than usual, wonder if she is in pain. With the help of daughter, I was able to partially assess suprapubic catheter site. Some surrounding redness and swelling noted. Was difficult to fully assess given patient quickly sat forward. I also did see that patient was not well kempt - noted intense skin and moisture build up between legs and urine soaked sheets. Difficult to assess if she had tenderness to palpation but there was no rebound. I was able to ask her if she is having abdominal pain and she said no.     - Place hygenic brief on patient, change sheets   - bed bath ordered q shift   - Urology consulted   - CBC, BMP, mag, phos, pro calcitonin, CRP   - CT abdomen   - pending lab workup, consideration for antibiotic administration   - Assess if pt is decisional, psychiatry consulted     Sujit Limon PA-C   [facility]  Securely message with the Vocera Web Console (learn more here)  Text page via [employer] Paging/Directory

## 2025-03-16 NOTE — PROGRESS NOTES
"[facility]    Medicine Progress Note - Hospitalist Service, GOLD TEAM 8    Date of Admission:  3/4/2025    Assessment & Plan   \"Aranza\" Alis Hartman is a 69 year old woman admitted on 2/5/2025. 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  Hospital course complicated by MADHU (improving) and hypervolemia, acute on chronic pain, difficult neurocognitive status/likely dementia and psychiatric history, decreased oral intake/malnutrition and ongoing discussion of goals of care.  Unable to place feeding tube due to patient's disorientation and position.  Current medical plan is to continue managing pain and schizophrenia, and reevaluate goals of care in a week.           Decreased oral intake/Refusal to eat   Hypoglycemia  Severe Protein-Calorie Malnutrition   Hypernatremia, likely secondary to decreased oral intake, resolved  Hypokalemia, likely secondary to decreased oral intake  Patient has been refusing to eat, has not had good nutrition for several days.  Calorie counts 3/11 to 3/15 showed 0 intake.  Likely secondary to difficult to manage psychiatric history, likely component of dementia.  Hypernatremia resolved with D5W.  - Monitor electrolytes on basic metabolic panel.  RN electrolyte replacement protocol ordered  - Monitor blood glucose every 4 hours   - D5 half NS started for hypoglycemia  - Continue to encourage oral intake  - Nutrition consulted, appreciate recommendations.    - Attempted XR feeding tube placement 3/14. unable to be placed due to patient " disorientation and difficulty laying flat (please see radiology notes for details).    Delirium vs Metabolic Encephalopathy vs Unspecified neurocognitive Disorder: MOCA 25/30 9/22/22 vs. Schizophrenia, likely all contributing  ARFAEL   On admission patient's family was concerned about her confusion. Patient has paranoia with suspected hallucinations. Acute worsening was likely related to possible UTI v/s dehydration lack of PO. B12 FOLATE, ammonia all normal. Initially considered CT head but patient is unable to lie flat.  - Psychiatry consulted, saw patient 3/11, patient denied depression, hallucinations and psych could not elicit any paranoia symptoms.    -Zyprexa 2.5 mg twice daily started 01/13  - Delirium precautions  - Pain management per below    Acute on Chronic Pain  Thigh, low back pain related to her coccyx. Usually hunched over, screaming in the room, used to be sitting better 03/16.  - Oxycodone 10 mg q3h prn tablet or solution  - IV dilaudid 0.5 mg Q2H prn for breakthrough pain   - Lidocaine patch  - Buprenorphine patch started 3/13  -Scheduled Tylenol    Acute on chronic anemia likely secondary to chronic disease, stable  Thrombocytopenia, stable  Hemoglobin 5.6 03/13 morning, has been 7-8.  No obvious bleeding.  Likely due to malnutrition and acute illness.  Improved with 1 unit blood  Workup 3/15: Normal B12, normal ferritin, low iron, normal folate, unable to calculate TIBC  - Monitor on CBC  - Prophylactic Lovenox discontinued    MADHU, oliguric, likely secondary to poor oral intake/malnutrition, resolved 3/15  Anasarca  Dependent facial edema  Creatinine at baseline 0.8-0.9 and admission. Trended up from 3/06, then peaked at 1.29 on 2/08, trended down and starting to trend up again 3/10.  Elevated urine protein creatinine ratio and urine albumin. UA 3/11: 200 albumin, large blood, large leukocyte Estrace, greater than 182 WBC, moderate bacteria, many yeast, hyaline casts.  Other differentials  considered include deconditioning causing ischemic ATN, calcium oxalate nephropathy in the setting of gastric bypass surgery.  Resolved with 100 g of 25% albumin on 3/12 and 3/13.  Renal US normal  -Nephrology signed off 3/16  -Lymphedema consulted, appreciate assistance.  Wraps placed.  -Strict I's and O's, daily weights  - Suprapubic catheter in place.  Urology consulted for erythema around catheter site and low urine output.  Catheter appears to be functioning well.  Recommend drain sponge or gauze around the skin.  - Monitor on BMP     - RLE ultrasound ordered 3/14 due to mild discrepancy in size however unable to be performed since patient cannot lay flat.     Goals of care   Advanced care planning Discussion  Recurrent hospitalizations with progressive step-wise decline  -3/16: Attempted to call the patient's daughter with updates, did not answer the phone.  Will attempt again in the tomorrow.  RN CC/social work assisting with planning care conference Wednesday or Thursday.  -3/14 : Updated patient's daughter with palliative care (please see their notes for further details).  Informed her that we were unable to place the feeding tube due to patient disorientation and positioning.  We will continue a time-limited trial of 1 week on the psych meds and plan for a care conference mid next week to reevaluate.  CODE STATUS remains full code.  We did emphasize to patient's family that we recommend hospice/comfort cares, and if patient continues to be disoriented/unable to consent to enteric tube, would recommend not placing tube.  - 3/13: Planned care conference today with oncology, palliative care.  Called patient's daughter the day prior and the morning of to confirm time.  Patient's daughter did not show up until about 1 PM.  Care conference had to be rescheduled.  Oncology was able to connect with the familyafter showing up.  Please see their documentation for full conversation.  Subsequently patient's daughter  elected to keep patient full code and will like enteric tube placed for nutrition.  Attempted to see daughter at bedside later, she had left the hospital.  I called her to discuss the nutrition component further with her.  We discussed that we will give the patient the opportunity to get nutrition through the tube for a maximum of 7 days and reevaluate. I also did inform her that if the patient refuses to have the tube placed or appears uncomfortable or pulls the tube we will not force her or replace the tube.  She expressed understanding.  I also did inform her that ethics is consulted to assist with conversations around her mother's care.  - 03/12: Extensive discussion with the patient's daughter regarding overall clinical condition and goals of care.  I did inform the daughter that her mother has been refusing all medications, food, cares, while inpatient.  Daughter expressed that she was worried that maybe her mom was paranoid.  I did inform her that she was seen by psychiatry who did not elicit any paranoia symptoms, however due to recommend treating schizophrenia with Zyprexa if family desires.  She had not made a decision about it.  She had questions about feeding tube, I did inform her that her mother is unable to lay down and is usually slumped over in a feeding tube may not be feasible, and having extra tube can make her delirium/mental health worse.  She expressed understanding, and is willing to come in 03/13 at 12 PM for Ethel discussion of goals of care, CODE STATUS with the primary team and palliative care.    Sacral pressure ulcer  Lower extremity sores  Wound around suprapubic catheter site  CRP elevated, likely due to ongoing inflammation.  No leukocytosis.  Afebrile.  Hemodynamically stable.  Low concern for cellulitis  - WOC consulted  - Monitor for now, if febrile will start antibiotics. urology recommends gauze or drain sponge around the site.  Hold off on CT abdomen for now as patient appears  well and it would be difficult for her to lay flat.    --------------------------------------------------------------   Afib   Rate controlled   Prior admission had risk/benefit discussion and discontinued AC due to vaginal bleeding.     Serous endometrial carcinoma  Follows w/ heme/onc through [facility]. S/p SNEHA, BSO 07/2024 s/p cycle 3 carbo/taxel 10/15/2024, cycle 4 delayed in s/o recent admission, family ultimately decided to forgo any further treatment.   During care conference, it was expressed that, her cancer is likely to recur due to it being an aggressive type but when it will recur is unknown. However, should it recur, GynOnc team expressed that, because of her functional status (significant weakness) and malnutrition, treatment would not be recommended since that would worsen her health overall and benefits would not outweigh the risks.    Pyuria   Hx of ESBL urosepsis and bacteremia  Bladder dysfunction s/p cystostomy and suprapubic catheter  Recently admitted 11/26 - 12/8/2024 for ESBL urosepsis and bacteremia requiring ICU w/ L nephrostomy tube (removed 1/7) treated w/ ertapenem, returned 1/25-1/27 for concern for UTI but no clear infection at that time.  ID was consulted that admission and recommended avoiding frequent UA and Ucx checks in future unless patient w/ symptoms of UTI. This admission increased confusion, poor appetite and increased generalized weakness, foul smelling urine. CT abdomen unremarkable. Not septic on presentation. U/A showed growing [id number] mixed elisa. U/A collected from exchanged kent in the ED 3/4. S/p unasyn (3/4-3/5). S/p zosyn (3/5-3/6).                Diet: Combination Diet Regular Diet Adult  Snacks/Supplements Adult: Ensure Enlive; With Meals  Snacks/Supplements Adult: Boost Soothe; Between Meals    DVT Prophylaxis: Pneumatic Compression Devices  Kent Catheter: Not present  Lines: PRESENT      Port a Cath 02/05/25 Single Lumen Right Chest wall-Site Assessment:  WDL      Cardiac Monitoring: None  Code Status: Full Code      Clinically Significant Risk Factors        # Hypokalemia: Lowest K = 3.1 mmol/L in last 2 days, will replace as needed  # Hyperkalemia: Highest K = 5.5 mmol/L in last 2 days, will monitor as appropriate  # Hypernatremia: Highest Na = 149 mmol/L in last 2 days, will monitor as appropriate  # Hyperchloremia: Highest Cl = 122 mmol/L in last 2 days, will monitor as appropriate        # Hypomagnesemia: Lowest Mg = 1.6 mg/dL in last 2 days, will replace as needed   # Hypoalbuminemia: Lowest albumin = 2.3 g/dL at 3/12/2025  5:34 AM, will monitor as appropriate   # Thrombocytopenia: Lowest platelets = 66 in last 2 days, will monitor for bleeding   # Hypertension: Noted on problem list             # Severe Malnutrition: based on nutrition assessment and treatment provided per dietitian's recommendations.    # Financial/Environmental Concerns: none         Social Drivers of Health    Food Insecurity: Unknown (3/12/2025)    Food Insecurity     Within the past 12 months, did you worry that your food would run out before you got money to buy more?: Patient unable to answer     Within the past 12 months, did the food you bought just not last and you didn t have money to get more?: Patient unable to answer   Housing Stability: Unknown (3/12/2025)    Housing Stability     Do you have housing? : Patient unable to answer     Are you worried about losing your housing?: Patient unable to answer   Financial Resource Strain: Unknown (3/12/2025)    Financial Resource Strain     Within the past 12 months, have you or your family members you live with been unable to get utilities (heat, electricity) when it was really needed?: Patient unable to answer   Transportation Needs: Unknown (3/12/2025)    Transportation Needs     Within the past 12 months, has lack of transportation kept you from medical appointments, getting your medicines, non-medical meetings or appointments, work,  or from getting things that you need?: Patient unable to answer   Interpersonal Safety: Unknown (3/12/2025)    Interpersonal Safety     Do you feel physically and emotionally safe where you currently live?: Patient unable to answer     Within the past 12 months, have you been hit, slapped, kicked or otherwise physically hurt by someone?: Patient unable to answer     Within the past 12 months, have you been humiliated or emotionally abused in other ways by your partner or ex-partner?: Patient unable to answer          Disposition Plan     Medically Ready for Discharge: Anticipated in 5+ Days             Deepali Negrete MD  Hospitalist Service, [address]  Securely message with [employer] (more info)  Text page via [facility] Paging/Directory   See signed in provider for up to date coverage information  ______________________________________________________________________    Interval History   Notes reviewed.  Concern for erythema around suprapubic catheter site overnight is with some swelling, intense skin and moisture between the legs, urine soaked skin, urology consulted, CT abdomen ordered.  Labs including CRP ordered.    This morning patient denies abdominal pain, pain at the site of the suprapubic catheter.  She is able to tell me she is in the hospital however not much beyond that.    Physical Exam   Vital Signs: Temp: 96.9  F (36.1  C) Temp src: Temporal BP: 115/69 Pulse: 74   Resp: 18 SpO2: 99 % O2 Device: None (Room air)    Weight: 159 lbs 9.81 oz    General Appearance:  Awake, Alert, Oriented to self and location, cachectic  HEENT: Left side of her face swelling today, patient leaning on that side of her body, it was on the right side yesterday when she was leaning on the right side  Chest: Right chest port catheter in place  Respiratory: Breathing comfortably on room air. Lungs are clear to auscultation bilaterally.    Cardiovascular: Regular rate and  rhythm, extremities perfused.    GI: Normal bowel sounds, Soft, nontender, nondistended.  Suprapubic catheter in place draining scant amount of urine, erythema around the catheter site  Extremities: Bilateral lower extremity edema wrapped, multiple lower extremity and digit wounds  Neurology:  moving all extremities appropriately     Medical Decision Making       60 MINUTES SPENT BY ME on the date of service doing chart review, history, exam, documentation & further activities per the note.      Data   ------------------------- PAST 24 HR DATA REVIEWED -----------------------------------------------    I have personally reviewed the following data over the past 24 hrs:    6.5  \   8.8 (L)   / 63 (L)     141 109 (H) 25.4 (H) /  117 (H)   4.2 21 (L) 0.95 \     ALT: 14 AST: 21 AP: 122 TBILI: 0.7   ALB: 3.1 (L) TOT PROTEIN: 4.7 (L) LIPASE: N/A     Procal: 1.48 (H) CRP: 88.28 (H) Lactic Acid: N/A         Imaging results reviewed over the past 24 hrs:   No results found for this or any previous visit (from the past 24 hours).

## 2025-03-16 NOTE — PLAN OF CARE
Goal Outcome Evaluation:      Plan of Care Reviewed With: patient    Overall Patient Progress: no changeOverall Patient Progress: no change    Outcome Evaluation: No acute changes this shift    Blood pressure 115/69, pulse 74, temperature 96.9  F (36.1  C), temperature source Temporal, resp. rate 18, weight 72.4 kg (159 lb 9.8 oz), SpO2 99%, not currently breastfeeding.    Reason for admission: 3/4 from home with increased confusion, poor appetite and increased generalized weakness.   Activity: AX2-3 . BA on  Pain: Managed by x1 prn oxycodone  Neuro: Oriented to self  Cardiac: WDL  Respiratory: RA, no s/s of distress  GI/: Denies nausea, voiding via suprapubic catheter. X3 large formed bm  Diet: Regular  Lines: Port infusing dextrose 5%, rate decreased from 100 to 50ml/hr  Wounds: Sacral, perineal skin redness, tear. Skin weeping from ble edema  Labs/imaging: Reviewed. Potassium recheck . BG 96,114 (failed to register on results, taken by writer), 89      Pt repositioned appropriately, sometimes declined. Bed bath done with help from daughter.   Pt's daughter preferred to have the CT scan done today, provider notified  Plan: Psych consulted to assess if pt is decisional. Urology consulted for low urine output with concerns for displaced suprapubic catheter and possible cellulitis.      Continue to monitor and follow POC

## 2025-03-16 NOTE — PROGRESS NOTES
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 Hospital stay complicated with MADHU and increased LE swelling and anasarca.    ##MADHU; Improved.  --Multifactorial; likely 2/2 poor oral intake, FTT, and deconditioing causing ischemic ATN.  --Pt did not respond to IV fluids and started to have 3rd spacing, worsening LE edema.  --Renal imaging ruled out obstruction and hydronephrosis, other possible causes is calcium oxalate nephropathy as pt has hx of gastric bypass surgery.  --UA showed large blood, LE +ve, WBC >182, RBC >182, Moderate bacteria, yeast +ve, hyaline cast +ve and urine specific gravity 1.02.  --Urine Na 25, indicating hypovolemia.  --Cr improving 0.95 03/16/2025 with Albumin with IV albumin.  --Agree with strict calorie count per primary team, and try to encourage oral intake as tolerated.  --Will hold on further GN workup as pt is not a good candidate for renal biopsy and immunosuppressants.  --Strict I&Os.  --Adjust all meds to kidney function.  --Avoid Nephrotoxins.    ##Anemia:  Hb 8.8.  Please repeat Iron studies.    ##Hypernatremia:  Na 149 today, BG 50.  Likely 2/2 poor oral intake.  Total water deficit for a goal Na of 145 is 0.9 L --> given the hypoglycemia, can give D5W 1 L over 12  hours.    ##Hypokalemia  ##Hypomagnesemia:  --Both resolved with repletion.    ##Metabolic acidosis:  --AG 9, HCO3 21, improving.    Recommendations were communicated to primary team via Vocera and Note.    Nephrology with sign off today, please reach out with any questions.    Seen and discussed with Dr. Diehl.      Navneet Sims MD  Division of Renal Disease and Hypertension  [facility]  [facility] Center[facility]ure[facility]ense Web Console        REASON FOR CONSULT: MADHU    Interval Events:  Pt is still refusing to eat/drink or taking meds; 0 calorie count yesterday.  Improving UOP --> 500 ml yesterday --> today so far 425 ml, improving.  XR feeding tube unable to be placed, pt was unable to be laid flat, Final discussion with palliative and family, to try tube feeds for 1 week and if no improvement they will discuss hospice again.    PAST MEDICAL HISTORY:  Reviewed with patient on 03/16/2025     Past Medical History:   Diagnosis Date    Atrial fibrillation (H)     Depression     Hypertension     Malignant neoplasm of endocervix (H)     Tx with radiation    Near syncope 11/07/2024    Orthopnea 09/21/2024    Other chronic pain     Low back    Schizophrenia (H)     Urinary incontinence        Past Surgical History:   Procedure Laterality Date    ABDOMINOPLASTY      BIOPSY CERVICAL, LOCAL EXCISION, SINGLE/MULTIPLE  10/26/2011    Procedure:BIOPSY CERVICAL, LOCAL EXCISION, SINGLE/MULTIPLE; EUA, cervical biopsies; Surgeon:BETTY TINEO; Location:[facility] OR    BIOPSY VAGINAL N/A 06/08/2021    Procedure: BIOPSY, VAGINA;  Surgeon: Dany Perez MD;  Location: [facility]    CYSTOSCOPY N/A 05/17/2024    Procedure: Cystoscopy;  Surgeon: Dany Perez MD;  [facility]    CYSTOSTOMY, INSERT TUBE SUPRAPUBIC, COMBINED  07/12/2024    Procedure: Insertion of supra-pubic catheter;  Surgeon: Dany Perez MD;  [facility]    DILATION AND CURETTAGE, WITH ULTRASOUND GUIDANCE N/A 05/17/2024    Procedure: Dilation and curettage of the uterus with  drainage of uterine fluid under ultrasound guidance, lysis of vaginal adhesions;  Surgeon: Dany Perez MD;  [facility]    EXAM UNDER ANESTHESIA PELVIC N/A 03/12/2020    Procedure: Exam under anesthsia, vaginal biopsies, possible CO2 laser of the vagina;  Surgeon: Lilliam Roy MD;  [facility]    GI SURGERY  2004    gastric bypass    HYSTERECTOMY TOTAL ABDOMINAL, BILATERAL SALPINGO-OOPHORECTOMY, COMBINED Bilateral 07/12/2024    Procedure: Diagnostic laparoscopy converted to exploratory laparotomy, total abdominal hysterectomy, bilateral salpingo-oophorectomy, lysis of adhesions >60 min;  Surgeon: Dany Perez MD;  [facility]    INSERT PORT VASCULAR ACCESS N/A 08/26/2024    Procedure: Insert port vascular access;  Surgeon: Avery Gregory MD;  [facility]    IR CHEST PORT PLACEMENT > 5 YRS OF AGE  08/26/2024    IR FOLLOW UP VISIT OUTPATIENT  1/7/2025    IR NEPHROSTOMY TUBE PLACEMENT LEFT  11/29/2024    LASER CO2 VAGINA N/A 03/02/2015    Procedure: LASER CO2 VAGINA;  Surgeon: Mariela Abdalla MD;  [facility]: [facility]    LASER CO2 VAGINA N/A 05/24/2019    Procedure: Exam under anesthesia, vaginal biopsies, CO2 laser of the vagina;  Surgeon: Lilliam Roy MD;  [facility]: [facility]    LASER CO2 VAGINA N/A 06/08/2021    Procedure: Exam under anesthesia, laser ablation of the upper vagina;  Surgeon: Dany Perez MD;  [facility]: [facility]    PICC DOUBLE LUMEN PLACEMENT Left 11/28/2024    left basilic 5 fr dl power picc 44 cm    REPAIR BLADDER N/A 07/12/2024    Procedure: Repair bladder;  Surgeon: Dany Perez MD;  [facility]        MEDICATIONS:  PTA Meds  Prior to Admission medications    Medication Sig Last Dose Taking? Auth Provider Long Term End Date   acetaminophen (TYLENOL) 325 MG tablet Take 3 tablets (975 mg) by mouth every 8 hours. 3/4/2025 Yes Maci Chandler MD No    albuterol (PROAIR HFA/PROVENTIL HFA/VENTOLIN HFA) 108 (90 Base) MCG/ACT inhaler Inhale 1-2  puffs into the lungs every 4 hours as needed for shortness of breath  Yes Reported, Patient     calcium Citrate-vitamin D 500-400 MG-UNIT CHEW Take 1 tablet by mouth daily Unknown Yes Reported, Patient     cyanocobalamin (CYANOCOBALAMIN) 1000 MCG/ML injection Inject 1 mL (1,000 mcg) into a muscle every month. Unknown Yes Reported, Patient     diclofenac (VOLTAREN) 1 % topical gel Apply 4 g topically 4 times daily as needed for moderate pain. 3/3/2025 Yes Mar Chua MD     ferrous sulfate (CASSANDRA-IN-SOL) 75 (15 FE) MG/ML oral drops Take 15 mg by mouth daily Unknown Yes Reported, Patient     hydrocortisone 2.5 % cream Apply topically as needed  Yes Reported, Patient     lidocaine (XYLOCAINE) 5 % external ointment Apply 1 Application topically as needed  Yes Reported, Patient     magic mouthwash suspension, diphenhydrAMINE, lidocaine, aluminum-magnesium & simethicone, (FIRST-MOUTHWASH BLM) compounding kit Swish and spit 10 mLs in mouth 3 times daily (before meals). 3/4/2025 Yes Maci Chandler MD     melatonin 1 MG TABS tablet Take 1 tablet (1 mg) by mouth nightly as needed for sleep. 3/3/2025 Bedtime Yes Maci Chandler MD     morphine 0.5 % in solosite gel Apply 8-16 clicks (4-8 g) topically daily as needed for pain (with dressing changes).  Yes Kamilah De La Torre PA-C     naloxone (NARCAN) 4 MG/0.1ML nasal spray Spray 1 spray (4 mg) into one nostril alternating nostrils as needed for opioid reversal every 2-3 minutes until assistance arrives  Yes Gaurang Guajardo MD Yes    ondansetron (ZOFRAN) 8 MG tablet Take 1 tablet (8 mg) by mouth every 8 hours as needed for nausea  Yes Kelsey Mccracken APRN CNP     oxyCODONE IR (ROXICODONE) 20 MG TABS immediate release tablet Take 20 mg by mouth every 4 hours as needed for severe pain (Chronic Pain). 3/4/2025 Yes Maci Chandler MD No    phenol (CHLORASEPTIC) 1.4 % spray Take 1-2 sprays (1-2 mLs) by mouth every hour as needed for sore throat.  Yes Dany Perez MD    "  polyethylene glycol (MIRALAX) 17 GM/Dose powder Take 17 g by mouth daily as needed for constipation 3/3/2025 Yes Dany Perez MD     prochlorperazine (COMPAZINE) 5 MG tablet Take 1-2 tablets (5-10 mg) by mouth every 6 hours as needed for nausea or vomiting  Yes Kelsey Mccracken APRN CNP     Skin Protectants, Misc. (INTERDRY 10\"X36\") SHEE Externally apply 10 each topically every 24 hours. Apply to skin folds on abdomen  Yes Dany Perez MD     triamcinolone (KENALOG) 0.1 % external ointment Apply topically 3 times daily as needed for irritation.  Yes Dany Perez MD        Current Meds  Current Facility-Administered Medications   Medication Dose Route Frequency Provider Last Rate Last Admin    acetaminophen (TYLENOL) oral liquid 975 mg  975 mg Oral Q8H Deepali Negrete MD   975 mg at 03/16/25 0930    artificial saliva (BIOTENE MT) solution 1 spray  1 spray Mouth/Throat 4x Daily Salvador Dickerson MD   1 spray at 03/15/25 2025    buprenorphine (BUTRANS) 10 MCG/HR WK patch 1 patch  1 patch Transdermal Weekly Deepali Negrete MD   1 patch at 03/13/25 1303    buprenorphine (BUTRANS) Patch in Place   Transdermal Q8H Deepali Negrete MD        [Held by provider] enoxaparin ANTICOAGULANT (LOVENOX) injection 40 mg  40 mg Subcutaneous Q24H Salvador Dickerson MD   40 mg at 03/13/25 0819    heparin lock flush 10 unit/mL injection 5-10 mL  5-10 mL Intracatheter Q24H Deepali Negrete MD   5 mL at 03/16/25 1049    heparin lock flush 100 unit/mL injection 5-10 mL  5-10 mL Intracatheter Q28 Days Deepali Negrete MD   5 mL at 03/13/25 1432    Lidocaine (LIDOCARE) 4 % Patch 1 patch  1 patch Transdermal Q24H Deepali Ngerete MD   1 patch at 03/16/25 0929    magic mouthwash suspension (diphenhydramine, lidocaine, aluminum-magnesium & simethicone)  10 mL Swish & Spit TID AC Salvador Dickerson MD   10 mL at 03/14/25 1107    magnesium oxide (MAG-OX) tablet 400 mg  400 mg Oral Q4H Deepali Negrete MD   400 mg at 03/16/25 " 1046    OLANZapine zydis (zyPREXA) ODT half-tab 2.5 mg  2.5 mg Oral BID Deepali Negrete MD   2.5 mg at 03/16/25 0929    polyethylene glycol (MIRALAX) Packet 17 g  17 g Oral Daily Piter Webber MD   17 g at 03/15/25 0843    potassium & sodium phosphates (NEUTRA-PHOS) Packet 1 packet  1 packet Oral or Feeding Tube Q4H Deepali Negrete MD   1 packet at 03/16/25 1046    senna-docusate (SENOKOT-S/PERICOLACE) 8.6-50 MG per tablet 1 tablet  1 tablet Oral At Bedtime Piter Webber MD   1 tablet at 03/15/25 2105    sodium chloride (PF) 0.9% PF flush 10-20 mL  10-20 mL Intracatheter Q28 Days Deepali Negrete MD   10 mL at 03/13/25 1630    sodium chloride (PF) 0.9% PF flush 3 mL  3 mL Intracatheter Q8H Salvador Dickerson MD   3 mL at 03/16/25 0025    thiamine (B-1) tablet 100 mg  100 mg Oral or Feeding Tube Daily Deepali Negrete MD   100 mg at 03/16/25 0929     Infusion Meds  Current Facility-Administered Medications   Medication Dose Route Frequency Provider Last Rate Last Admin    dextrose 10% infusion   Intravenous Continuous PRN Deepali Negrete MD           ALLERGIES:    Allergies   Allergen Reactions    Ibuprofen Nausea and Vomiting    Shrimp     Sulfa Antibiotics Rash     Patient started on bactrim 7/24/24 tolerating medication without rash on 7/25        REVIEW OF SYSTEMS:  A comprehensive of systems was negative except as noted above.    SOCIAL HISTORY:   Social History     Socioeconomic History    Marital status: Single     Spouse name: Not on file    Number of children: Not on file    Years of education: Not on file    Highest education level: Not on file   Occupational History    Not on file   Tobacco Use    Smoking status: Never    Smokeless tobacco: Never   Substance and Sexual Activity    Alcohol use: Not Currently    Drug use: No    Sexual activity: Not on file   Other Topics Concern    Parent/sibling w/ CABG, MI or angioplasty before 65F 55M? Not Asked   Social History Narrative    Not on file      Social Drivers of Health     Financial Resource Strain: Unknown (3/12/2025)    Financial Resource Strain     Within the past 12 months, have you or your family members you live with been unable to get utilities (heat, electricity) when it was really needed?: Patient unable to answer   Food Insecurity: Unknown (3/12/2025)    Food Insecurity     Within the past 12 months, did you worry that your food would run out before you got money to buy more?: Patient unable to answer     Within the past 12 months, did the food you bought just not last and you didn t have money to get more?: Patient unable to answer   Transportation Needs: Unknown (3/12/2025)    Transportation Needs     Within the past 12 months, has lack of transportation kept you from medical appointments, getting your medicines, non-medical meetings or appointments, work, or from getting things that you need?: Patient unable to answer   Physical Activity: Not on file   Stress: Not on file   Social Connections: Not on file   Interpersonal Safety: Unknown (3/12/2025)    Interpersonal Safety     Do you feel physically and emotionally safe where you currently live?: Patient unable to answer     Within the past 12 months, have you been hit, slapped, kicked or otherwise physically hurt by someone?: Patient unable to answer     Within the past 12 months, have you been humiliated or emotionally abused in other ways by your partner or ex-partner?: Patient unable to answer   Housing Stability: Unknown (3/12/2025)    Housing Stability     Do you have housing? : Patient unable to answer     Are you worried about losing your housing?: Patient unable to answer       FAMILY MEDICAL HISTORY:   Family History   Problem Relation Age of Onset    Heart Disease Father     Heart Failure Father     No Known Problems Brother     No Known Problems Brother     No Known Problems Brother     Pancreatitis Brother     Hypertension Sister     Peripheral Vascular Disease Sister     No  Known Problems Sister     No Known Problems Son     No Known Problems Daughter        PHYSICAL EXAM:   Temp  Av  F (36.1  C)  Min: 93  F (33.9  C)  Max: 98.7  F (37.1  C)      Pulse  Av.7  Min: 52  Max: 111 Resp  Av.4  Min: 13  Max: 20  SpO2  Av.9 %  Min: 91 %  Max: 100 %       /86 (BP Location: Left arm, Cuff Size: Adult Regular)   Pulse 81   Temp 97.6  F (36.4  C) (Temporal)   Resp 18   Wt 72.4 kg (159 lb 9.8 oz)   SpO2 92%   BMI 28.27 kg/m     Date 25 07 - 25 0659   Shift 2874-9341 0193-6292 1853-8668 24 Hour Total   INTAKE   P.O. 470   470   Colloid 500   500   Shift Total(mL/kg) 970(15.85)   970(15.85)   OUTPUT   Shift Total(mL/kg)       Weight (kg) 61.2 61.2 61.2 61.2      Admit Weight: 61.2 kg (134 lb 14.7 oz)     General Appearance: Ill-looking pt, cachectic.  Chest: Right chest port catheter in place.  Respiratory: On room air. Lungs are clear to auscultation bilaterally.    Cardiovascular: Regular rate and rhythm.    GI: Normal bowel sounds, Soft, nontender, nondistended.  Suprapubic catheter in place draining scant amount of urine.  Extremities: Bilateral lower extremity edema.   Neurology: Alert to self, Moving all extremities    LABS:   CMP  Recent Labs   Lab 25  1216 25  0954 25  0502 25  0453 25  0024 03/15/25  1709 03/15/25  1553 03/15/25  1005 03/15/25  0554 25  2131 25  0521 25  0652   NA  --   --  139  --  140  --  143  144  --  149*  --  143 141   POTASSIUM  --   --  4.2  --   --   --  4.3  4.3  --  3.1* 5.5* 3.2* 3.4   CHLORIDE  --   --  109*  --   --   --  113*  --  122*  --  110* 110*   CO2  --   --  21*  --   --   --  19*  --  15*  --  21* 22   ANIONGAP  --   --  9  --   --   --  11  --  12  --  12 9   GLC 64* 87 197* 89  --    < > 102*   < > 50*  --  75 82   BUN  --   --  25.4*  --   --   --  30.1*  --  24.2*  --  31.8* 32.5*   CR  --   --  0.95  --   --   --  1.11*  --  0.91  --  1.39* 1.50*    GFRESTIMATED  --   --  64  --   --   --  53*  --  67  --  40* 37*   SAMUEL  --   --  8.5*  --   --   --  9.1  --  6.9*  --  9.2 8.7*   MAG  --   --  1.9  --   --   --  2.1  --  1.6*  --  2.2 2.2   PHOS  --   --  2.3*  --   --   --  2.5  --  2.1*  --  2.4* 2.9   PROTTOTAL  --   --  4.7*  --   --   --   --   --  3.9*  --  5.4* 4.6*   ALBUMIN  --   --  3.1*  --   --   --   --   --  2.8*  --  4.1 3.2*   BILITOTAL  --   --  0.7  --   --   --   --   --  0.6  --  0.9 0.5   ALKPHOS  --   --  122  --   --   --   --   --  102  --  146 114   AST  --   --  21  --   --   --   --   --  16  --  26 19   ALT  --   --  14  --   --   --   --   --  11  --  15 16    < > = values in this interval not displayed.     CBC  Recent Labs   Lab 03/16/25  0502 03/15/25  2058 03/15/25  0554 03/14/25  0521   HGB 8.8* 8.0* 7.4* 7.3*   WBC 6.5 6.7 7.0 6.7   RBC 2.85* 2.62* 2.37* 2.39*   HCT 26.6* 24.0* 21.9* 21.8*   MCV 93 92 92 91   MCH 30.9 30.5 31.2 30.5   MCHC 33.1 33.3 33.8 33.5   RDW 24.9* 25.2* 25.3* 23.9*   PLT 63* 68* 66* 66*     INRNo lab results found in last 7 days.  ABGNo lab results found in last 7 days.   URINE STUDIES  Recent Labs   Lab Test 03/11/25  1445 03/07/25  0832 03/04/25  2030 02/23/25  1728 07/21/24  1705 04/11/24  1152 05/23/23  0820 02/15/23  0927   COLOR Orange* Orange* Yellow Dark Brown*   < > Yellow   < > Yellow   APPEARANCE Cloudy* Cloudy* Slightly Cloudy* Cloudy*   < > Clear   < > Clear   URINEGLC Negative Negative Negative Negative   < > Negative   < > Negative   URINEBILI Negative Negative Negative Negative   < > Negative   < > Negative   URINEKETONE Negative Trace* Negative Negative   < > Negative   < > Negative   SG 1.020 1.026 1.014 1.021   < > 1.010   < > 1.015   UBLD Large* Large* Large* Large*   < > Moderate*   < > Large*   URINEPH 5.5 5.5 5.5 5.5   < > 7.0   < > 7.0   PROTEIN 200* 200* 50* 100*   < > Trace*   < > Negative   UROBILINOGEN  --   --   --   --   --  1.0  --  1.0   NITRITE Negative Negative  Negative Negative   < > Positive*   < > Positive*   LEUKEST Large* Large* Large* Large*   < > Large*   < > Small*   RBCU >182* 61* >182* >182*   < > 10-25*   < > 25-50*   WBCU >182* >182* >182* >182*   < > >100*   < > 10-25*    < > = values in this interval not displayed.     No lab results found.  PTH  No lab results found.  IRON STUDIES  Recent Labs   Lab Test 03/15/25  0554 02/15/25  0836 09/24/24  1330   IRON 9* 17* 22*   FEB  --  40* 100*   IRONSAT  --  43 22   CASSANDRA 281 270 450*       IMAGING:  The radiologist's report which is filed    Navneet Sims MD

## 2025-03-16 NOTE — PROGRESS NOTES
Calorie Count  Intake recorded for: 3/15  Total Kcals: 0 Total Protein: 0g  Kcals from Hospital Food: 0   Protein: 0g  Kcals from Outside Food (average):0 Protein: 0g  # Meals Ordered from Kitchen: 0 meals ordered  # Meals Recorded: 0 meals recorded  # Supplements Recorded: none recorded   Note: supplement order tickets were included but unclear if they were consumed

## 2025-03-17 ENCOUNTER — APPOINTMENT (OUTPATIENT)
Dept: CT IMAGING | Facility: CLINIC | Age: 70
End: 2025-03-17
Attending: STUDENT IN AN ORGANIZED HEALTH CARE EDUCATION/TRAINING PROGRAM
Payer: COMMERCIAL

## 2025-03-17 LAB
ALBUMIN SERPL BCG-MCNC: 2.6 G/DL (ref 3.5–5.2)
ALBUMIN UR-MCNC: 50 MG/DL
ALP SERPL-CCNC: 113 U/L (ref 40–150)
ALT SERPL W P-5'-P-CCNC: 16 U/L (ref 0–50)
ANION GAP SERPL CALCULATED.3IONS-SCNC: 5 MMOL/L (ref 7–15)
APPEARANCE UR: ABNORMAL
AST SERPL W P-5'-P-CCNC: 27 U/L (ref 0–45)
BILIRUB SERPL-MCNC: 0.4 MG/DL
BILIRUB UR QL STRIP: NEGATIVE
BUN SERPL-MCNC: 22.3 MG/DL (ref 8–23)
CALCIUM SERPL-MCNC: 8.2 MG/DL (ref 8.8–10.4)
CHLORIDE SERPL-SCNC: 114 MMOL/L (ref 98–107)
COLOR UR AUTO: ABNORMAL
CREAT SERPL-MCNC: 0.8 MG/DL (ref 0.51–0.95)
EGFRCR SERPLBLD CKD-EPI 2021: 78 ML/MIN/1.73M2
GLUCOSE BLDC GLUCOMTR-MCNC: 109 MG/DL (ref 70–99)
GLUCOSE BLDC GLUCOMTR-MCNC: 114 MG/DL (ref 70–99)
GLUCOSE BLDC GLUCOMTR-MCNC: 117 MG/DL (ref 70–99)
GLUCOSE BLDC GLUCOMTR-MCNC: 71 MG/DL (ref 70–99)
GLUCOSE BLDC GLUCOMTR-MCNC: 76 MG/DL (ref 70–99)
GLUCOSE BLDC GLUCOMTR-MCNC: 79 MG/DL (ref 70–99)
GLUCOSE BLDC GLUCOMTR-MCNC: 87 MG/DL (ref 70–99)
GLUCOSE SERPL-MCNC: 79 MG/DL (ref 70–99)
GLUCOSE UR STRIP-MCNC: NEGATIVE MG/DL
HCO3 SERPL-SCNC: 22 MMOL/L (ref 22–29)
HGB UR QL STRIP: ABNORMAL
KETONES UR STRIP-MCNC: NEGATIVE MG/DL
LEUKOCYTE ESTERASE UR QL STRIP: ABNORMAL
MAGNESIUM SERPL-MCNC: 1.9 MG/DL (ref 1.7–2.3)
MUCOUS THREADS #/AREA URNS LPF: PRESENT /LPF
NITRATE UR QL: NEGATIVE
PH UR STRIP: 5.5 [PH] (ref 5–7)
PHOSPHATE SERPL-MCNC: 2.4 MG/DL (ref 2.5–4.5)
POTASSIUM SERPL-SCNC: 3.8 MMOL/L (ref 3.4–5.3)
PROT SERPL-MCNC: 4 G/DL (ref 6.4–8.3)
RBC URINE: 142 /HPF
SODIUM SERPL-SCNC: 141 MMOL/L (ref 135–145)
SP GR UR STRIP: 1.02 (ref 1–1.03)
SQUAMOUS EPITHELIAL: 1 /HPF
TRANSITIONAL EPI: 1 /HPF
UROBILINOGEN UR STRIP-MCNC: NORMAL MG/DL
WBC CLUMPS #/AREA URNS HPF: PRESENT /HPF
WBC URINE: >182 /HPF
YEAST #/AREA URNS HPF: ABNORMAL /HPF

## 2025-03-17 PROCEDURE — 82947 ASSAY GLUCOSE BLOOD QUANT: CPT

## 2025-03-17 PROCEDURE — 250N000013 HC RX MED GY IP 250 OP 250 PS 637: Performed by: STUDENT IN AN ORGANIZED HEALTH CARE EDUCATION/TRAINING PROGRAM

## 2025-03-17 PROCEDURE — 87186 SC STD MICRODIL/AGAR DIL: CPT | Performed by: PHYSICIAN ASSISTANT

## 2025-03-17 PROCEDURE — 51705 CHANGE OF BLADDER TUBE: CPT | Performed by: PHYSICIAN ASSISTANT

## 2025-03-17 PROCEDURE — 250N000013 HC RX MED GY IP 250 OP 250 PS 637: Performed by: PHYSICIAN ASSISTANT

## 2025-03-17 PROCEDURE — 81001 URINALYSIS AUTO W/SCOPE: CPT | Performed by: PHYSICIAN ASSISTANT

## 2025-03-17 PROCEDURE — 83735 ASSAY OF MAGNESIUM: CPT | Performed by: STUDENT IN AN ORGANIZED HEALTH CARE EDUCATION/TRAINING PROGRAM

## 2025-03-17 PROCEDURE — 99207 PR APP CREDIT; MD BILLING SHARED VISIT: CPT | Performed by: INTERNAL MEDICINE

## 2025-03-17 PROCEDURE — 250N000011 HC RX IP 250 OP 636: Mod: JZ | Performed by: PHARMACIST

## 2025-03-17 PROCEDURE — 258N000003 HC RX IP 258 OP 636: Performed by: STUDENT IN AN ORGANIZED HEALTH CARE EDUCATION/TRAINING PROGRAM

## 2025-03-17 PROCEDURE — 99233 SBSQ HOSP IP/OBS HIGH 50: CPT | Performed by: STUDENT IN AN ORGANIZED HEALTH CARE EDUCATION/TRAINING PROGRAM

## 2025-03-17 PROCEDURE — 74176 CT ABD & PELVIS W/O CONTRAST: CPT | Mod: 26 | Performed by: RADIOLOGY

## 2025-03-17 PROCEDURE — 99233 SBSQ HOSP IP/OBS HIGH 50: CPT | Performed by: PHYSICIAN ASSISTANT

## 2025-03-17 PROCEDURE — 74176 CT ABD & PELVIS W/O CONTRAST: CPT

## 2025-03-17 PROCEDURE — 120N000002 HC R&B MED SURG/OB UMMC

## 2025-03-17 PROCEDURE — G0463 HOSPITAL OUTPT CLINIC VISIT: HCPCS

## 2025-03-17 PROCEDURE — 99418 PROLNG IP/OBS E/M EA 15 MIN: CPT | Performed by: STUDENT IN AN ORGANIZED HEALTH CARE EDUCATION/TRAINING PROGRAM

## 2025-03-17 PROCEDURE — 84100 ASSAY OF PHOSPHORUS: CPT | Performed by: STUDENT IN AN ORGANIZED HEALTH CARE EDUCATION/TRAINING PROGRAM

## 2025-03-17 PROCEDURE — 258N000003 HC RX IP 258 OP 636: Performed by: INTERNAL MEDICINE

## 2025-03-17 RX ORDER — DEXTROSE MONOHYDRATE AND SODIUM CHLORIDE 5; .45 G/100ML; G/100ML
INJECTION, SOLUTION INTRAVENOUS CONTINUOUS
Status: DISCONTINUED | OUTPATIENT
Start: 2025-03-17 | End: 2025-03-17

## 2025-03-17 RX ORDER — DEXTROSE MONOHYDRATE 50 MG/ML
INJECTION, SOLUTION INTRAVENOUS
Status: DISCONTINUED
Start: 2025-03-17 | End: 2025-03-17 | Stop reason: HOSPADM

## 2025-03-17 RX ORDER — HYDROMORPHONE HYDROCHLORIDE 1 MG/ML
0.5 INJECTION, SOLUTION INTRAMUSCULAR; INTRAVENOUS; SUBCUTANEOUS
Status: DISCONTINUED | OUTPATIENT
Start: 2025-03-17 | End: 2025-03-19

## 2025-03-17 RX ORDER — MAGNESIUM OXIDE 400 MG/1
400 TABLET ORAL EVERY 4 HOURS
Status: COMPLETED | OUTPATIENT
Start: 2025-03-17 | End: 2025-03-17

## 2025-03-17 RX ORDER — OXYCODONE HCL 5 MG/5 ML
10 SOLUTION, ORAL ORAL
Status: DISCONTINUED | OUTPATIENT
Start: 2025-03-17 | End: 2025-03-18

## 2025-03-17 RX ORDER — BUPRENORPHINE 20 UG/H
1 PATCH TRANSDERMAL WEEKLY
Status: DISCONTINUED | OUTPATIENT
Start: 2025-03-17 | End: 2025-04-04 | Stop reason: HOSPADM

## 2025-03-17 RX ORDER — DEXTROSE AND SODIUM CHLORIDE 10; .45 G/100ML; G/100ML
INJECTION, SOLUTION INTRAVENOUS CONTINUOUS
Status: DISCONTINUED | OUTPATIENT
Start: 2025-03-17 | End: 2025-03-24

## 2025-03-17 RX ORDER — DEXTROSE MONOHYDRATE, SODIUM CHLORIDE, AND POTASSIUM CHLORIDE 50; 1.49; 4.5 G/1000ML; G/1000ML; G/1000ML
INJECTION, SOLUTION INTRAVENOUS
Status: DISCONTINUED
Start: 2025-03-17 | End: 2025-03-17 | Stop reason: HOSPADM

## 2025-03-17 RX ORDER — OXYCODONE HCL 5 MG/5 ML
5 SOLUTION, ORAL ORAL
Status: DISCONTINUED | OUTPATIENT
Start: 2025-03-17 | End: 2025-03-18

## 2025-03-17 RX ADMIN — HYDROMORPHONE HYDROCHLORIDE 0.5 MG: 1 INJECTION, SOLUTION INTRAMUSCULAR; INTRAVENOUS; SUBCUTANEOUS at 07:03

## 2025-03-17 RX ADMIN — Medication 1 SPRAY: at 08:31

## 2025-03-17 RX ADMIN — MAGNESIUM OXIDE TAB 400 MG (241.3 MG ELEMENTAL MG) 400 MG: 400 (241.3 MG) TAB at 12:41

## 2025-03-17 RX ADMIN — DEXTROSE AND SODIUM CHLORIDE: 5; 450 INJECTION, SOLUTION INTRAVENOUS at 06:32

## 2025-03-17 RX ADMIN — OXYCODONE HYDROCHLORIDE 10 MG: 5 SOLUTION ORAL at 08:42

## 2025-03-17 RX ADMIN — LIDOCAINE 4% 1 PATCH: 40 PATCH TOPICAL at 08:32

## 2025-03-17 RX ADMIN — Medication 2.5 MG: at 08:31

## 2025-03-17 RX ADMIN — BUPRENORPHINE 1 PATCH: 20 PATCH, EXTENDED RELEASE TRANSDERMAL at 17:57

## 2025-03-17 RX ADMIN — POTASSIUM & SODIUM PHOSPHATES POWDER PACK 280-160-250 MG 1 PACKET: 280-160-250 PACK at 08:30

## 2025-03-17 RX ADMIN — DIPHENHYDRAMINE HYDROCHLORIDE AND LIDOCAINE HYDROCHLORIDE AND ALUMINUM HYDROXIDE AND MAGNESIUM HYDRO 10 ML: KIT at 12:44

## 2025-03-17 RX ADMIN — POTASSIUM & SODIUM PHOSPHATES POWDER PACK 280-160-250 MG 1 PACKET: 280-160-250 PACK at 12:41

## 2025-03-17 RX ADMIN — Medication 1 SPRAY: at 16:37

## 2025-03-17 RX ADMIN — Medication 2.5 MG: at 21:41

## 2025-03-17 RX ADMIN — OXYCODONE HYDROCHLORIDE 10 MG: 5 SOLUTION ORAL at 12:41

## 2025-03-17 RX ADMIN — OXYCODONE HYDROCHLORIDE 5 MG: 5 SOLUTION ORAL at 16:30

## 2025-03-17 RX ADMIN — DIPHENHYDRAMINE HYDROCHLORIDE AND LIDOCAINE HYDROCHLORIDE AND ALUMINUM HYDROXIDE AND MAGNESIUM HYDRO 10 ML: KIT at 08:31

## 2025-03-17 RX ADMIN — ACETAMINOPHEN 975 MG: 160 SOLUTION ORAL at 08:29

## 2025-03-17 RX ADMIN — DIPHENHYDRAMINE HYDROCHLORIDE AND LIDOCAINE HYDROCHLORIDE AND ALUMINUM HYDROXIDE AND MAGNESIUM HYDRO 10 ML: KIT at 16:41

## 2025-03-17 RX ADMIN — ACETAMINOPHEN 975 MG: 160 SOLUTION ORAL at 16:30

## 2025-03-17 RX ADMIN — OXYCODONE HYDROCHLORIDE 10 MG: 5 SOLUTION ORAL at 05:49

## 2025-03-17 RX ADMIN — DEXTROSE AND SODIUM CHLORIDE: 10; .45 INJECTION, SOLUTION INTRAVENOUS at 12:41

## 2025-03-17 RX ADMIN — THIAMINE HCL TAB 100 MG 100 MG: 100 TAB at 08:31

## 2025-03-17 RX ADMIN — Medication 1 SPRAY: at 12:44

## 2025-03-17 RX ADMIN — MAGNESIUM OXIDE TAB 400 MG (241.3 MG ELEMENTAL MG) 400 MG: 400 (241.3 MG) TAB at 08:31

## 2025-03-17 RX ADMIN — POTASSIUM & SODIUM PHOSPHATES POWDER PACK 280-160-250 MG 1 PACKET: 280-160-250 PACK at 16:30

## 2025-03-17 RX ADMIN — OXYCODONE HYDROCHLORIDE 10 MG: 5 SOLUTION ORAL at 19:30

## 2025-03-17 RX ADMIN — Medication 1 SPRAY: at 21:41

## 2025-03-17 ASSESSMENT — ACTIVITIES OF DAILY LIVING (ADL)
ADLS_ACUITY_SCORE: 96
ADLS_ACUITY_SCORE: 92
ADLS_ACUITY_SCORE: 92
ADLS_ACUITY_SCORE: 96
ADLS_ACUITY_SCORE: 96
ADLS_ACUITY_SCORE: 92
ADLS_ACUITY_SCORE: 92
ADLS_ACUITY_SCORE: 96
ADLS_ACUITY_SCORE: 96
ADLS_ACUITY_SCORE: 92
ADLS_ACUITY_SCORE: 96
ADLS_ACUITY_SCORE: 96
ADLS_ACUITY_SCORE: 92
ADLS_ACUITY_SCORE: 96
ADLS_ACUITY_SCORE: 96

## 2025-03-17 NOTE — PROGRESS NOTES
Care Management Follow Up    Length of Stay (days): 13    Expected Discharge Date: 03/18/2025     Concerns to be Addressed:       Patient plan of care discussed at interdisciplinary rounds: Yes    Anticipated Discharge Disposition:                Anticipated Discharge Services:    Anticipated Discharge DME:      Patient/family educated on Medicare website which has current facility and service quality ratings:    Education Provided on the Discharge Plan:    Patient/Family in Agreement with the Plan:      Referrals Placed by CM/SW:    Private pay costs discussed: Not applicable    Discussed  Partnership in Safe Discharge Planning  document with patient/family: Yes:      Handoff Completed: Yes, [facility] PCP: Internal handoff referral completed    Additional Information:  RNCC was notified by provider that patient is needing a GOC conference set up with pallative, oncology, patients daughter, and any other family for this week.  RNCC set up care conference for Thursday at 1 pm.  RNCC confirmed with patients daughter, and she states she will let the rest of her family know.     RNCC was notified by charge nurse during rounds that HAILEE Alcaraz coordinator called regarding patients elderly waiver. RNCC called Lissa back to find more about this and left a VM.    UPDATE 1600: Palliative asked that I add Ethics to CC.  RNCC teams Emily Lao to notify her of CC.    Lissa STEPHEN coordinator  [phone]    GOC Conferance  Thursday, March 20th at 1 pm.    Pallative-confirmed with Dr. Kamilah De La Torre  Oncology-confirmed with Zahira Sims that someone from team would be present  Medicine- confirmed with Deepali Negrete    Next Steps:   []Follow up with needs after CC on Thursday and with HAILEE Alcaraz coordinator  []  external hand off - Maria Fernanda Eckert, SHOAIB    Nurse Coordinator     Covering for: Lizbeth SALES    Phone: [phone]    Social Work and Care Management Department    SEARCHABLE in [place] - search  CARE COORDINATOR    [place] [facility] [place] ([phone]) Saturday & Sunday; ([phone]) [facility] Recognized Holidays    Units: 5A, 5B & 5C  Pager: [phone]    Units: 6B, 6C & 6D    Pager: [phone]    Units: 7A, 7B, 7C & 7D    Pager: [phone]    Units: 6A & ICU   Pager: [phone]    Units: 5 Ortho, 5MS & WB ED Pager: [phone]    Units: 6MS, 8A & 10 ICU  Pager [phone]

## 2025-03-17 NOTE — PROGRESS NOTES
PALLIATIVE CARE PROGRESS NOTE  [facility]     Patient Name: Alis Hartman  Date of Admission: 3/4/2025   Today the patient was seen for: goals of care     Recommendations & Counseling     GOALS OF CARE:   Life-prolonging without limits  Per discussions with providers last week, plan for 1 week time limited trial of feeding tube while treating agitation and pain symptoms with goal to see if these treatments lead to improved mentation and participation in cares that would enable a life prolonging treatment pathway. Unfortunately unable to place NG tube 3/14 due to confusion and inability to lay flat, continuing with trial of symptom management alone.  Unit care coordinator/SW team assisting with planning  a care conference for Wednesday or Thursday. We have communicated to Joy that it's very important for her to be physically present for this meeting and to have other family members join (by phone if needed). Palliative care team will plan to attend that meeting Thursday at 1300.  Updates today 3/17:  Aranza was reportedly more alert this morning and able to state preferences about her treatment (declined having suprapubic catheter changed, declined CT scan, doesn't want to be in the hospital, doesn't want to eat). Our team saw her twice today and while she was more alert this afternoon, she was not able to answer our questions. Given fluctuating mentation, suspect Aranza has limited capacity for complex decision making and will continue to require family support.  Joy (daughter) present at bedside this afternoon with Medicine and Palliative teams. Received updates including that Aranza has frequently been declining food and interventions. Joy reports her mom doesn't feel safe without family present in the hospital and will be more agreeable when Joy is present. Joy repeatedly told her mom that she needs to eat and allow cares including CT scan.  "Emphasized to Joy that we also need to respect Aranza's wishes if/when she clearly declines interventions.    ADVANCE CARE PLANNING:  Previously completed HCD naming Aranza (daughter) as HCA, appears this was not dated and deemed \"invalid.\" Aranza confirmed during last admission that Joy is her surrogate decision maker per notes in chart.   There is a POLST form on file, this was reviewed and current.  Code status: Full Code     MEDICAL MANAGEMENT:   #Pain, chronic, low back and abdominal after surgery in July. On chronic prn opioids in varying doses x >10 years. Was down to 5mg oxycodone q 6 hours, but in recent month dose increased up to 20mg q 6 hours prn during last hospitalization.    #Pain, L thigh  #Pain, sacral/coccyx wound and B arm and leg wounds  Remains in bed today, confused and in pain but is unable to elaborate further. Has significant sedation effects from full-agonist opioids   butrans patch 10mcg/hr started 3/13  10mcg/hr dose would now be in full effect. In efforts to reduce need for full agonist opioids (given their sedating affects to Aranza), will increase dose to 20mcg/hr today (done)  Can be increased further. If over time not effective would consider rotation to fentanyl patch or methadone  Acetaminophen 975mg q8hr  Oxycodone to 10mg q3hr prn. Will change dose to 5-10mg today given sedating effects (done)  Hydromorphone to 0.5mg q2 hr prn for pain not controlled with PO meds.   Encourage bowel regimen     # agitation and confusion:  olanzapine 2.5mg bid started 3/13 per psychiatry recommendations  Today she seems calmer and more interactive     PSYCHOSOCIAL/SPIRITUAL:  Family: Daughter (Joy) and grandchildren, son (Hemanth), niece (Armida)  Joy mentions things being \"God's plan\", though specific [employer] needs/affiliations not addressed     Palliative Care will continue to follow.     This patient was seen with resident Dr. Veronica Kahn and they contributed to the note. I " personally overwrote and edited each section of this note to reflect my direct involvement and medical decision making in the patient's care today.     Kamilah De La Torre PA-C  [facility], Palliative Care  Securely message with the [employer] Web Console (learn more here) or  Text page via [facility] Paging/Directory      Assessment          Alis Hartman is a 69 year old female with a past medical history of cervical cancer s/p radiation, seriuos adenocarcinoma s/p SNEHA/BSO and cystotomy w/ suprapubic catheter placement 07/2024 and 3 cycles of carbo/taxol (10/2024), hx ESBL urosepsis and bacteremia, RNY gastric bypass, afib on apixaban, HTN, CKD stage 3, and multiple recent admissions (most recently 2/5-2/25 with [facility], deconditioning, and malnutrition). She is now admitted with concern for UTI and failure to thrive/general weakness.      The patient's daughter Joy met with Dr. Perez of gyn-onc and goals of care were discussed. She discussed hospice and palliative care has been consulted to continue goals of care discussion, including discussion of hospice and pain management.      We had a care conference on 3/7 and again on 3/10. Spoke with Joy again on 3/11.  Daughter (Joy) expresses that she is very overwhelmed by her mother's condition and the possibility of her dying. Goals are currently restorative though we are continuing to discuss options with Joy, including hospice.     Repeat care conference planned for 12pm 3/13- daughter did not show up until 1pm and providers needed to reschedule meeting but gyn/onc provider Dr. Perez was able to speak with Joy and made plan for time-limited trial of feeding tube and treatments for pain and agitation.     On 3/14, we were able to talk on the phone with daughter and medicine team. At that meeting, we informed her that we were unable to place the feeding tube due to patient's inability to lay flat and her disorientation. We agreed upon a 1-week time limited  trial of pain meds (butrans patch) and psychiatric meds (olanzapine) and plan for a care conference mid-week.     Patient's daughter seen at bedside on 3/17. Seen in a joint visit with hospitalist. Explained to Joy that Aranza voiced preferences this morning not to have the CT scan, SPT exchanged, or to be in the hospital in general. We also voiced our continued concern that Aranza is not eating. Aranza would like to be called with updates and feels that Aranza does better (eats more, is more agreeable to cares, etc) when she is here.      Today, the patient was seen for:  Goal of care  Family support  Symptom management  Palliative encounter      Interval History:     Multidisciplinary collaboration:  Medicine provider: On her exam, Aranza was more interactive with medicine team today. She was able to answer questions at that time. Per Dr. Negrete, Aranza expressed that she did not want the CT scan, did not want to be in the hospital, and did not want her supra-pubic catheter exchanged.     Notable medications:  Acetaminophen 975 mg q8h (taking as scheduled)  Hydromorphone IV 0.5mg q2h PRN - x1 in past 24 hr  Lidocaine patch  Magic mouthwash scheduled- minimally using  Miralax daily- not using x2 days  Senna at bedtime - did not take yesterday  Oxycodone PRN - 10mg x4 past 24h  Butrans 10mgc/hr  D5 0.45% NaCl infusion  Zyprexa 2.5mg BID    Patient/family narrative  Patient's daughter seen at bedside in a joint visit with hospitalist. We explained to Joy that Aranza voiced preferences this morning not to have the CT scan, SPT exchanged, or to be in the hospital in general. We also voiced our continued concern that Aranza is not eating. Aranza would like to be called with updates and feels that Aranza does better (eats more, is more agreeable to cares, etc) when she is here.     Aranza was more alert this afternoon though remained unable to answer our questions. She did endorse pain in her backside.      Physical Exam:    Temp:  [94.7  F (34.8  C)-97.9  F (36.6  C)] 95.3  F (35.2  C)  Pulse:  [62-81] 66  Resp:  [16-20] (P) 18  BP: ()/(43-86) 104/65  SpO2:  [92 %-100 %] 100 %  159 lbs 9.81 oz    Gen: Laying in bed in fetal position, chronically ill appearing  Neuro: minimally interactive, does not answer questions  Pulm: nonlabored breathing, comfortable on room air, no cough  CV: RRR by peripheral pulse, noncyanotic   ABD: Unable to examine  MSK:  BLE edema  Skin: Warm, dry, no rashes or abrasions on exposed skin. Chronic wounds not exposed        Data Reviewed:     Labs notable for:  Creatinine 0.8 (improved from 1.11 on 3/15)  Albumin 2.6    No new imaging    Medical Decision Making       MANAGEMENT DISCUSSED with the following over the past 24 hours: Medicine   NOTE(S)/MEDICAL RECORDS REVIEWED over the past 24 hours: Medicine, Urology  Medical complexity over the past 24 hours:  - Prescription DRUG MANAGEMENT performed  60 MINUTES SPENT BY ME on the date of service doing chart review, history, exam, documentation & further activities per the note.

## 2025-03-17 NOTE — PROGRESS NOTES
[facility] Medicine Cross Cover Note    March 17, 2025 6:08 AM    I was notified by RN that this patient had glucose levels of 75 and then 71 while on D5 1/2NS 75ml/hr for hypoglycemia. She does not want to take anything by mouth at this time.     Action taken:   -Increased the D5 1/2NS rate to 100 ml/hr  -consider switching to D10     Communicated plan via secure messaging.     I spent 10 minutes on the date of the encounter doing chart review, history and exam, documentation and further activities noted above.     Corina Baxter MD on 3/17/2025 at 6:08 AM

## 2025-03-17 NOTE — PLAN OF CARE
/65 (BP Location: Right arm)   Pulse 66   Temp (!) 95.6  F (35.3  C) (Temporal)   Resp (P) 18   Wt 72.4 kg (159 lb 9.8 oz)   SpO2 100%   BMI 28.27 kg/m      Care from: [id number]    Alert, disoriented to time, illogical speech at times. VSS on room air ex hypothermia- MD aware, tino hugger on. Poor appetite and oral intake. Urology changed suprapubic catheter, loose incontinent stool x1 this shift. R port is infusing with D10W 1/2NS at 75mL/hr. BG  79, 87. Dusky, tenderness, and 3+ edema in BLE. Writer changed bilateral thigh and leg dressings, provided perineal care, applied moisture barrier cream to scrum and groins, applied interdry in between both thighs. Mobility is significantly impaired, assist of 2 and lift. Sent urine sample to lab and sent pt down to CT. Call light within reach and bed alarm on. Continue to follow poc.    Goal Outcome Evaluation:    Plan of Care Reviewed With: patient    Overall Patient Progress: no change    Outcome Evaluation: wound dressing change was done, prn oxy x2 was given for severe pain, poor appetite and po intake

## 2025-03-17 NOTE — PLAN OF CARE
Goal Outcome Evaluation:      Plan of Care Reviewed With: patient    Overall Patient Progress: improvingOverall Patient Progress: improving    Outcome Evaluation: Pt cooperative to cares this shift    Reason for admission: 3/4 from home with increased confusion, poor appetite and increased generalized weakness.   Activity: AX2-3 . BA on  Pain: Managed by x1 prn oxycodone  Neuro: Oriented to self, place  Cardiac: BP 67/53, recheck 83/70, 130/64. Asymptomatic Provider notified  Respiratory: RA, no s/s of distress  GI/: Denies nausea, voiding via suprapubic catheter. X3 large formed bm  Diet: Regular, poor appetite . Pt took a cup of chocolate pudding  Lines: Port infusing dextrose 5%, 0.45% NaCl at 75ml/hr  Wounds: Sacral, perineal skin redness, tear. Skin weeping from ble edema  Labs/imaging: Reviewed. Na 141. . , 76(chocolate pudding given), 71      Pt repositioned appropriately, sometimes declined.   Plan: Psych consulted to assess if pt is decisional. Urology consulted, plan to exchange suprapubic catheter this week

## 2025-03-17 NOTE — PROGRESS NOTES
Brief Palliative Care Note    Returned to Aranza's room shortly after visit this afternoon to ensure daughter's phone number was clearly displayed. Aranza was awake and tearful, shared she is feeling very sad. When I asked why she is feeling sad, she said she is feeling very down. She described loneliness and missing her family when they are not here. Aranza also said she is upset because she cannot say everything she wants to in front of her family. I asked if she felt like her family wanted different things for her care than she wants, she said yes. Provided support and emphasized that we want to ensure Aranza's voice is heard. She expressed interest in seeing a , which I requested.    Aranza has reportedly had fluctuating mentation throughout admission as well as intermittent engagement with cares/providers. Earlier today she did not respond to our questions at all, when daughter was here she attempted to respond but was mumbling and somewhat tangential, then quite clear shortly after daughter left. During our initial visit, Joy repeatedly told her mom that she needs to eat and allow cares including CT scan. Aranza did not respond but was tearful throughout our visit.    I am concerned that family's goals for Aranza appear to be in conflict with her preferences and behavior, plan to continue to have Ethics involved, care conference later this week. Aranza's limited engagement and frequent denial of cares may also be due to depression, will to reach out to Psychiatry for additional recommendations.    Kamilah De La Torre PA-C  [facility], Palliative Care  Securely message with the [employer] Web Console (learn more here) or  Text page via [employer] Paging/Directory

## 2025-03-17 NOTE — PLAN OF CARE
Goal Outcome Evaluation:      Plan of Care Reviewed With: patient    Overall Patient Progress: no change    Outcome Evaluation: [id number]    Alert, disoriented to time, illogical speech at times. VSS on room air ex hypothermia- MD aware, tino adamsgger on. PRN oxy given x2 for pain. Poor appetite and oral intake. Urology changed suprapubic catheter this morning but still leaks occasionally- Dressing changed. Loose incontinent stool x2 this shift. R port is infusing with D10W 1/2NS at 75mL/hr.  and 117. Dusky, tenderness, and 3+ edema in BLE. RN provided perineal care, applied moisture barrier cream to sacrum and groins. Mobility is significantly impaired, assist of 2 and lift. Pt had some fruit and snacks that her daughter brought with. Call light within reach and bed alarm on. Continue to follow poc.

## 2025-03-17 NOTE — PROGRESS NOTES
Urology Progress Note    Interval History:  Patient due for suprapubic tube exchange.      Physical Exam  Temp:  [94.7  F (34.8  C)-97.9  F (36.6  C)] 95.4  F (35.2  C)  Pulse:  [62-68] 66  Resp:  [16-20] (P) 18  BP: ()/(43-73) 104/65  SpO2:  [95 %-100 %] 100 %    I/O last 3 completed shifts:  In: 1624.75 [P.O.:410; I.V.:1214.75]  Out: 515 [Urine:515]    Gen: Pt withdrawn, hunched, somnolent, speaking sparsely and softly  Abd:   SPT site is erythematous with obvious moisture-related dermatitis, now covered with barrier cream  SPT Os is patent.      PROCEDURE:  After taking down 10cc balloon the patient's existing 16Fr latex SPT was removed without difficult  SITE was cleansed with betadyne and a new 16Fr latex Lummi-tip catheter was introduced into the bladder via the SP tract using sterile precautions  Immediate output of TURBID yellow urine   10cc balloon inflated  Shahid secured tension-free to left leg.      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 An SPT was left in place post-op. At follow-up visit with Dr Monroy on 9/4/24, pt elected to continue with SPT because she felt it improved her QOL compared to her history of severe incontinence. She has been having monthly SPT exchanges by home health RNs without issues. In 11/2024 she was  admitted for sepsis due to likely urinary source and had a left PNT placed. The PNT was subsequently removed by IR in 12/2024 after passing a clamp trial. Renal ultrasound after PNT removal on 2/7/25 without hydronephrosis. Most recent renal ultrasound on 3/8/25 without hydronephrosis.     The patient was admitted 3/5 with confusion.  Yesterday Urology was consulted to assess SPT site.       Today:  Suprapubic tube routinely exchanged today.  Given the turbid nature of the urine, will send for culture  Regarding the skin around the SPT --> difficult to definitively rule out cellulitis given the chronic moisture dermatitis and Hx radiation to the site. Either way, agree with appropriate skin care and barrier creams.    Will be due for next SPT exchange in 1 month (~ 2/17/25).  OK for home health nurse to do if the patient has those resources.      Patient seen by me.   Urology will sign off.      Tiana Campbell PA-C  Urology Physician Assistant  Personal Pager: [phone]  Also available via [employer]       Contacting the Urology Team     Please use [employer] to page or text page the Urology Team.

## 2025-03-17 NOTE — PROGRESS NOTES
"[facility] Nurse Inpatient Assessment     Consulted for: coccyx  3/7: New consult for wounds to bilateral legs  3/17: New consult for wound around SP cath    Summary: known by [facility] from previous admission     [facility] nurse follow-up plan: weekly    Patient History (according to provider note(s):      The patient is a 69 year old female, with an extensive PMHx which is nicely documented in Dr. Khan and Mis's note from the ER.  Per their notes:  Ms. 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  The patient reportedly was feeling sick and her \"caretakers\" wanted her seen by a physician.  Apparently she has not been feeling well since her last discharge from the hospital with increased confusion, poor appetite and increased generalized weakness which now she is clearly confined to her bed because of this. Her urine output(UOP) has been foul smelling and lower amounts.  The patient refused to come to the ER 2 days ago.  The patient denies any other complaints but again has a difficult time offering a cogent history as to how she is feeling and what she's complaining of.  The remainder of the history of present illness is limited given the patient's altered mental status    Assessment:      Areas visualized during today's visit: Focused: and Abdomen    Wound location: Lower abdomen around SP catheter      Last photo: 3/17  Wound due to: Friction and Moisture Associated Skin Damage (MASD)  Wound history/plan of care: Skin break down noted by bedside RN. Pt has chronic indwelling SP cath. Tube was exchanged at bedside by Urology 3/17  Wound base: 100 % Erythema and superficial erosion     Palpation " of the wound bed: normal      Drainage: none     Description of drainage: none     Measurements (length x width x depth, in cm): See photos     Tunneling: N/A     Undermining: N/A  Periwound skin: Superficial erosion      Color: pink      Temperature: normal   Odor: none  Pain: moderate, tender  Pain interventions prior to dressing change: slow and gentle cares   Treatment goal: Heal  and Protection  STATUS: initial assessment  Supplies ordered: at bedside     Wound location: Bilateral legs - Not assessed 3/17    Left medial lower leg      Left posterior thigh      Right Calf      Right hip      Last photo: 3/13  Wound due to:  Edema blisters  Wound history/plan of care: Pt had large edema blisters on a previous admission that unroofed. Large wound on left medial leg 75% epithelialized.  Wound base: 50 % Dermis, 50 % new pink Epidermis -     Palpation of the wound bed: normal      Drainage: small     Description of drainage: serous     Measurements (length x width x depth, in cm): See photos     Tunneling: N/A     Undermining: N/A  Periwound skin: Intact, Ecchymosis, and Edematous - there is purple mottling scattered around bilateral legs      Color: normal and consistent with surrounding tissue      Temperature: normal   Odor: none  Pain: moderate and during dressing change, aching  Pain interventions prior to dressing change: slow and gentle cares   Treatment goal: Heal  and Protection  STATUS: healing  Supplies ordered: at bedside     Pressure Injury Location: Sacrum/coccyx - Not assessed 3/17                           2/18      Last photo: 3/13  Wound type: Pressure Injury and Shear     Pressure Injury Stage: site of previously healed pressure injury, present on admission     Wound history/plan of care:   unknown worst stage, currently appears to be a stage 2 but was possibly deeper. Was noted as a reinjury on her last assessment here 12/4/24 and 1/27/25  Wound base: 100 % Fibrin of open area with purple intact  "around.      Palpation of the wound bed: normal      Drainage: small     Description of drainage: none     Measurements (length x width x depth, in cm) total area 7 x 4 x 0.1 cm   Periwound skin: Intact and Erythema- blanchable      Color: normal and consistent with surrounding tissue      Temperature: normal   Odor: none  Pain: moderate, tender  Pain intervention prior to dressing change: slow and gentle cares   Treatment goal: Heal   STATUS: stable  Supplies ordered: discussed with RN and discussed with patient       Treatment Plan:     Lower abdomen around SP cath: BID and as needed.   Cleanse the area with Marry cleanse and protect, very gently with soft cloth.  Apply ostomy powder (#785550) on all open and denuded skin.  Apply thin layer of Barrier paste (ex: Critic aid) on top of it.  With repeat application, do not scrub the paste, only remove soiled paste and reapply.  If complete removal of paste is necessary use baby oil/mineral oil (#457441) and soft wash cloth.    sacrum wound(s): Every other day and PRN cleanse with wound cleanser and pat dry. Paint quang wound skin with no sting. Apply Triad paste(order#[id number]) over open wound and cover with sacral mepilex. AVOID supine position if able.     Bilateral lower legs wounds: Every other day: remove dressings and wash wounds with Vashe and gauze. Pat dry. Cover open areas with Vaseline gauze and secure with large Mepilex or ABD and stretch netting. If using Mepilex, note patient has very delicate skin so care should be taken to prevent worsening skin tears when removing.     Pressure Injury Prevention (PIP) Plan:  If patient is declining pressure injury prevention interventions: Explore reason why and address patient's concerns, Educate on pressure injury risk and prevention intervention(s), If patient is still declining, document \"informed refusal\" , and Ensure Care team is aware ( provider, charge nurse, etc)  Mattress: Follow bed algorithm, add Low Air Loss " (Air+) mattress pump if skin is very moist or constantly moist.   HOB: Maintain at or below 30 degrees, unless contraindicated  Repositioning in bed: Every 1-2 hours , Left/right positioning; avoid supine, Raise foot of bed prior to raising head of bed, to reduce patient sliding down (shear), and Frequent microturns using positioning wedges, as patient tolerates  Heels: Pillows under calves  Protective Dressing: Sacral Mepilex for prevention (#947601),  especially for the agitated patient   Positioning Equipment:None  Chair positioning: Chair cushion (#890444) , Assist patient to reposition hourly, and Do NOT use a donut for sitting (this increases pressure to smaller area and creates a higher potential for injury)    If patient has a buttock pressure injury, or high risk for PI use chair cushion or SPS.  Moisture Management: Perineal cleansing /protection: Follow Incontinence Protocol, Avoid brief in bed, Clean and dry skin folds with bathing , Consider InterDry (#218607) between folds, and Moisturize dry skin  Under Devices: Inspect skin under all medical devices during skin inspection , Ensure tubes are stabilized without tension, and Ensure patient is not lying on medical devices or equipment when repositioned  Ask provider to discontinue device when no longer needed.     Orders: Reviewed and Written    RECOMMEND PRIMARY TEAM ORDER: None, at this time  Education provided: importance of repositioning, plan of care, and wound progress  Discussed plan of care with: Patient and Nurse  Notify WOC if wound(s) deteriorate.  Nursing to notify the Provider(s) and re-consult the WOC Nurse if new skin concern.    DATA:     Current support surface: Standard  Standard gel mattress (Isoflex)  Containment of urine/stool: Incontinence Protocol and Indwelling catheter  BMI: Body mass index is 28.27 kg/m .   Active diet order: Orders Placed This Encounter      Combination Diet Regular Diet Adult     Output: I/O last 3 completed  shifts:  In: 1624.75 [P.O.:410; I.V.:1214.75]  Out: 515 [Urine:515]     Labs:   Recent Labs   Lab 03/17/25  0455 03/16/25  0502   ALBUMIN 2.6* 3.1*   HGB  --  8.8*   WBC  --  6.5     Pressure injury risk assessment:   Sensory Perception: 2-->very limited  Moisture: 3-->occasionally moist  Activity: 1-->bedfast  Mobility: 2-->very limited  Nutrition: 1-->very poor  Friction and Shear: 1-->problem  Yogi Score: 10    Gaurang Barba RN, CWOCN  Pager no longer in use, please contact through [employer] group: [facility] Nurse [place]    Dept. Office Number: [phone]

## 2025-03-17 NOTE — PROGRESS NOTES
"[facility] [place]    Medicine Progress Note - Hospitalist Service, GOLD TEAM 8    Date of Admission:  3/4/2025    Assessment & Plan   \"Aranza\" Alis Hartman is a 69 year old woman admitted on 2/5/2025. 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  Hospital course complicated by MADHU (resolved) and hypervolemia, acute on chronic pain, difficult neurocognitive status/likely dementia and psychiatric history, decreased oral intake with severe malnutrition and ongoing discussion of goals of care.  Current medical plan is to continue managing pain and schizophrenia, and reevaluate goals of care this week. Planned for 3/20.        Decreased oral intake/Refusal to eat   Hypoglycemia  Severe Protein-Calorie Malnutrition   Hypernatremia, likely secondary to decreased oral intake, resolved  Hypokalemia, likely secondary to decreased oral intake  Patient has been refusing to eat, has not had good nutrition for several days.  Calorie counts 3/11 to 3/15 showed 0 intake.  Likely secondary to difficult to manage psychiatric history, likely component of dementia.  Hypernatremia resolved with D5W.  - Monitor electrolytes on basic metabolic panel.  RN electrolyte replacement protocol ordered  - Monitor blood glucose every 4 hours   - Change fluids to D10 half NS   - Continue to encourage oral intake  - Nutrition consulted, appreciate recommendations.    - Attempted XR feeding tube placement 3/14. unable to be placed due to patient disorientation and difficulty laying flat (please see radiology " notes for details).    Delirium vs Metabolic Encephalopathy vs Unspecified neurocognitive Disorder: MOCA 25/30 9/22/22 vs. Schizophrenia, likely all contributing  RAFAEL   On admission patient's family was concerned about her confusion. Patient has paranoia with suspected hallucinations. Acute worsening was likely related to possible UTI v/s dehydration lack of PO. B12 FOLATE, ammonia all normal. Initially considered CT head but patient is unable to lie flat.  - Psychiatry consulted, saw patient 3/11, patient denied depression, hallucinations and psych could not elicit any paranoia symptoms.    -Zyprexa 2.5 mg twice daily started 03/13  - Delirium precautions  - Pain management per below    Acute on Chronic Pain  Thigh, low back pain related to her coccyx. Usually hunched over, screaming in the room, used to be sitting better 03/16.  - Oxycodone changed to 5-10mg from 10 mg q3h due to sedating effects  - IV dilaudid 0.5 mg Q2H prn for breakthrough pain   - Lidocaine patch  - Buprenorphine patch started 3/13, dose increased 3/17   - Scheduled Tylenol  - CT abd/pelvis given abdominal discomfort     Acute on chronic anemia likely secondary to chronic disease, stable  Thrombocytopenia, stable  Hemoglobin 5.6 03/13 morning, has been 7-8.  No obvious bleeding.  Likely due to malnutrition and acute illness.  Improved with 1 unit blood  Workup 3/15: Normal B12, normal ferritin, low iron, normal folate, unable to calculate TIBC  - Monitor on CBC  - Prophylactic Lovenox discontinued (plt <100)    MADHU, oliguric, likely secondary to poor oral intake/malnutrition, resolved 3/15  Anasarca  Dependent facial edema  Creatinine at baseline 0.8-0.9 and admission. Trended up from 3/06, then peaked at 1.29 on 2/08, trended down and starting to trend up again 3/10.  Elevated urine protein creatinine ratio and urine albumin. UA 3/11: 200 albumin, large blood, large leukocyte Estrace, greater than 182 WBC, moderate bacteria, many yeast,  hyaline casts.  Other differentials considered include deconditioning causing ischemic ATN, calcium oxalate nephropathy in the setting of gastric bypass surgery.  Resolved with 100 g of 25% albumin on 3/12 and 3/13.  Renal US normal  -Nephrology signed off 3/16  -Lymphedema consulted, appreciate assistance.  Wraps placed.  -Strict I's and O's, daily weights  - Suprapubic catheter in place, Exchanged 3/17 by Urology. Next exchange in one month. UA sent by urology after exchange due to appearing turbid.  - Monitor on BMP     - RLE ultrasound ordered 3/14 due to mild discrepancy in size however unable to be performed since patient cannot lay flat.     Goals of care   Advanced care planning Discussion  Recurrent hospitalizations with progressive step-wise decline  -3/17: Patient's daughter was updated in person with the palliative care team.She is motivated to convince her mother to eat and allow CT abdomen to be done. She expressed to the team that her mother is distrustful at times in the hospital and she is able to convince her. We did express to her that we do appreciate her advocating for her mother however as her mother's providers, we do have to find the balance of respecting the patient's wishes as well. Also informed her that unless she is in the hospital with her mother 24/7, it is difficult to await for her to be in the hospital to perform medical cares for her mother. Ethics will be very helpful in the coming goals of care discussion on 3/20 at 1pm with palliative care, patient's oncologist and the primary team to as patient appears to be more involved in discussions about her care today.      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 Trended down without antibiotics.  No leukocytosis.  Afebrile.  Hemodynamically  stable.  Low concern for cellulitis.  - WOC consulted  - Monitor for now, if febrile will start antibiotics. urology recommends gauze or drain sponge around the site.    --------------------------------------------------------------   Afib   Rate controlled   Prior admission had risk/benefit discussion and discontinued AC due to vaginal bleeding.     Serous endometrial carcinoma  Follows w/ heme/onc through [facility]. S/p SNEHA, BSO 07/2024 s/p cycle 3 carbo/taxel 10/15/2024, cycle 4 delayed in s/o recent admission, family ultimately decided to forgo any further treatment.   During care conference, it was expressed that, her cancer is likely to recur due to it being an aggressive type but when it will recur is unknown. However, should it recur, GynOnc team expressed that, because of her functional status (significant weakness) and malnutrition, treatment would not be recommended since that would worsen her health overall and benefits would not outweigh the risks.    Pyuria   Hx of ESBL urosepsis and bacteremia  Bladder dysfunction s/p cystostomy and suprapubic catheter  Recently admitted 11/26 - 12/8/2024 for ESBL urosepsis and bacteremia requiring ICU w/ L nephrostomy tube (removed 1/7) treated w/ ertapenem, returned 1/25-1/27 for concern for UTI but no clear infection at that time.  ID was consulted that admission and recommended avoiding frequent UA and Ucx checks in future unless patient w/ symptoms of UTI. This admission increased confusion, poor appetite and increased generalized weakness, foul smelling urine. CT abdomen unremarkable. Not septic on presentation. U/A showed growing [id number] mixed elisa. U/A collected from exchanged kent in the ED 3/4. S/p unasyn (3/4-3/5). S/p zosyn (3/5-3/6).             Diet: Combination Diet Regular Diet Adult  Snacks/Supplements Adult: Ensure Enlive; With Meals  Snacks/Supplements Adult: Boost Soothe; Between Meals    DVT Prophylaxis: Pneumatic Compression Devices  [place]ent  Catheter: Not present  Lines: PRESENT      Port a Cath 02/05/25 Single Lumen Right Chest wall-Site Assessment: WDL      Cardiac Monitoring: None  Code Status: Full Code      Clinically Significant Risk Factors          # Hyperchloremia: Highest Cl = 113 mmol/L in last 2 days, will monitor as appropriate          # Hypoalbuminemia: Lowest albumin = 2.3 g/dL at 3/12/2025  5:34 AM, will monitor as appropriate   # Thrombocytopenia: Lowest platelets = 63 in last 2 days, will monitor for bleeding   # Hypertension: Noted on problem list             # Severe Malnutrition: based on nutrition assessment and treatment provided per dietitian's recommendations.    # Financial/Environmental Concerns: none         Social Drivers of Health    Food Insecurity: Unknown (3/12/2025)    Food Insecurity     Within the past 12 months, did you worry that your food would run out before you got money to buy more?: Patient unable to answer     Within the past 12 months, did the food you bought just not last and you didn t have money to get more?: Patient unable to answer   Housing Stability: Unknown (3/12/2025)    Housing Stability     Do you have housing? : Patient unable to answer     Are you worried about losing your housing?: Patient unable to answer   Financial Resource Strain: Unknown (3/12/2025)    Financial Resource Strain     Within the past 12 months, have you or your family members you live with been unable to get utilities (heat, electricity) when it was really needed?: Patient unable to answer   Transportation Needs: Unknown (3/12/2025)    Transportation Needs     Within the past 12 months, has lack of transportation kept you from medical appointments, getting your medicines, non-medical meetings or appointments, work, or from getting things that you need?: Patient unable to answer   Interpersonal Safety: Unknown (3/12/2025)    Interpersonal Safety     Do you feel physically and emotionally safe where you currently live?: Patient  "unable to answer     Within the past 12 months, have you been hit, slapped, kicked or otherwise physically hurt by someone?: Patient unable to answer     Within the past 12 months, have you been humiliated or emotionally abused in other ways by your partner or ex-partner?: Patient unable to answer          Disposition Plan     Medically Ready for Discharge: Anticipated in 5+ Days             Deepali Negrete MD  Hospitalist Service, [address]  Securely message with [employer] (more info)  Text page via [facility] Paging/Directory   See signed in provider for up to date coverage information  ______________________________________________________________________    Interval History   This morning patient reports pain in her \"bottom\".  As well as abdominal pain.  Denying nausea.  Not answering questions about why she is not eating well.  In the AM she refusing to get CT scan done.  She is alert and oriented and knows her name, the , and knows that she is in the hospital.  She reports that she does not like CT scans and she does not want to be in the hospital.    Physical Exam   Vital Signs: Temp: (!) (P) 94.7  F (34.8  C) Temp src: (P) Tympanic BP: 106/73 Pulse: 66   Resp: (P) 18 SpO2: 100 % O2 Device: None (Room air)    Weight: 159 lbs 9.81 oz    General Appearance:  Awake, Alert, Oriented to self, location and time.   Chest: Right chest port catheter in place  Respiratory: Breathing comfortably on room air. Lungs are clear to auscultation bilaterally.    Cardiovascular: Regular rate and rhythm, extremities perfused.    GI: Normal bowel sounds, Soft, tender to palpation, nondistended.  Suprapubic catheter in place draining scant amount of urine, erythema around the catheter site  Extremities: Bilateral lower extremity edema wrapped, multiple lower extremity and digit wounds  Neurology:  moving all extremities appropriately     Medical " Decision Making       75 MINUTES SPENT BY ME on the date of service doing chart review, history, exam, documentation & further activities per the note.      Data   ------------------------- PAST 24 HR DATA REVIEWED -----------------------------------------------    I have personally reviewed the following data over the past 24 hrs:    N/A  \   N/A   / N/A     141 114 (H) 22.3 /  109 (H)   3.8 22 0.80 \     ALT: 16 AST: 27 AP: 113 TBILI: 0.4   ALB: 2.6 (L) TOT PROTEIN: 4.0 (L) LIPASE: N/A       Imaging results reviewed over the past 24 hrs:   Recent Results (from the past 24 hours)   CT Abdomen Pelvis w/o Contrast    Narrative    EXAM: CT ABDOMEN PELVIS W/O CONTRAST 3/17/2025 4:01 PM    HISTORY: 69 years Female abdominal pain, patient unable to further  characterize.    COMPARISON: CT abdomen/pelvis 3/4/2025.    TECHNIQUE: Axial CT images from the lung bases through the symphysis  pubis were obtained without IV contrast. Coronal and sagittal  reformats provided.     FINDINGS:    LINES/TUBES: Suprapubic catheter terminates in the bladder..    LUNG BASES: Small right and trace left pleural effusion. Right  middle/lower lobe dependent consolidation/atelectasis. Additional  probable compressive atelectasis in the left lung base. Left lower  lobe calcified granuloma. Multiple calcified mediastinal lymph nodes.  Mitral annular calcifications.    HEPATIC: No suspicious focal hepatic masses or lesions.    BILIARY: Cholelithiasis and sludge. No significant gallbladder wall  thickening or pericholecystic inflammatory change. No intra or  extrahepatic biliary dilatation.    SPLEEN: Multiple punctate splenic granulomas.    PANCREAS: No mass or peripancreatic fluid.    ADRENALS: Unremarkable.    RENAL: No hydronephrosis, calculi, or mass.    URINARY BLADDER: Decompressed with suprapubic catheter in place.    PELVIC ORGANS: Unremarkable.    GASTROINTESTINAL: Postoperative changes of gastric bypass. Small and  large bowel are  normal in caliber and without abnormal wall  thickening. No portal venous gas or pneumatosis.     MESENTERY/PERITONEUM: No ascites or pneumoperitoneum.    LYMPH NODES: No lymphadenopathy.    VASCULAR: Bilateral common iliac to common femoral artery stents.  Scattered atherosclerotic calcifications.    MUSCULOSKELETAL: No acute or suspicious osseous lesion. Degenerative  change throughout the spine, hips, and sacroiliac joints. Diffuse  muscular atrophy and anasarca.      Impression    IMPRESSION:     1. Small/moderate and trace left pleural effusions with bilateral  lower lobe consolidations, right greater than left. Consolidations may  represent dependent/compressive atelectasis, though underlying  infection is not excluded.  2. Otherwise no acute findings in the abdomen or pelvis.  3. Third spacing of fluid with marked anasarca.    I have personally reviewed the examination and initial interpretation  and I agree with the findings.    FORREST RAMIREZ MD         SYSTEM ID:  [id number]

## 2025-03-18 ENCOUNTER — APPOINTMENT (OUTPATIENT)
Dept: OCCUPATIONAL THERAPY | Facility: CLINIC | Age: 70
End: 2025-03-18
Payer: MEDICAID

## 2025-03-18 LAB
ALBUMIN SERPL BCG-MCNC: 2.4 G/DL (ref 3.5–5.2)
ALP SERPL-CCNC: 114 U/L (ref 40–150)
ALT SERPL W P-5'-P-CCNC: 22 U/L (ref 0–50)
ANION GAP SERPL CALCULATED.3IONS-SCNC: 7 MMOL/L (ref 7–15)
AST SERPL W P-5'-P-CCNC: 33 U/L (ref 0–45)
BASOPHILS # BLD AUTO: 0 10E3/UL (ref 0–0.2)
BASOPHILS NFR BLD AUTO: 0 %
BILIRUB SERPL-MCNC: 0.4 MG/DL
BUN SERPL-MCNC: 19.4 MG/DL (ref 8–23)
BURR CELLS BLD QL SMEAR: SLIGHT
CALCIUM SERPL-MCNC: 8 MG/DL (ref 8.8–10.4)
CHLORIDE SERPL-SCNC: 114 MMOL/L (ref 98–107)
CREAT SERPL-MCNC: 0.78 MG/DL (ref 0.51–0.95)
EGFRCR SERPLBLD CKD-EPI 2021: 81 ML/MIN/1.73M2
ELLIPTOCYTES BLD QL SMEAR: SLIGHT
EOSINOPHIL # BLD AUTO: 0 10E3/UL (ref 0–0.7)
EOSINOPHIL NFR BLD AUTO: 0 %
ERYTHROCYTE [DISTWIDTH] IN BLOOD BY AUTOMATED COUNT: 25.7 % (ref 10–15)
FRAGMENTS BLD QL SMEAR: SLIGHT
GLUCOSE BLDC GLUCOMTR-MCNC: 103 MG/DL (ref 70–99)
GLUCOSE BLDC GLUCOMTR-MCNC: 118 MG/DL (ref 70–99)
GLUCOSE BLDC GLUCOMTR-MCNC: 124 MG/DL (ref 70–99)
GLUCOSE BLDC GLUCOMTR-MCNC: 67 MG/DL (ref 70–99)
GLUCOSE BLDC GLUCOMTR-MCNC: 80 MG/DL (ref 70–99)
GLUCOSE BLDC GLUCOMTR-MCNC: 95 MG/DL (ref 70–99)
GLUCOSE SERPL-MCNC: 124 MG/DL (ref 70–99)
HCO3 SERPL-SCNC: 20 MMOL/L (ref 22–29)
HCT VFR BLD AUTO: 23.8 % (ref 35–47)
HGB BLD-MCNC: 7.9 G/DL (ref 11.7–15.7)
HOLD SPECIMEN HIT: NORMAL
IMM GRANULOCYTES # BLD: 0 10E3/UL
IMM GRANULOCYTES NFR BLD: 0 %
LYMPHOCYTES # BLD AUTO: 0.8 10E3/UL (ref 0.8–5.3)
LYMPHOCYTES NFR BLD AUTO: 18 %
MAGNESIUM SERPL-MCNC: 1.7 MG/DL (ref 1.7–2.3)
MCH RBC QN AUTO: 30.7 PG (ref 26.5–33)
MCHC RBC AUTO-ENTMCNC: 33.2 G/DL (ref 31.5–36.5)
MCV RBC AUTO: 93 FL (ref 78–100)
MONOCYTES # BLD AUTO: 0.2 10E3/UL (ref 0–1.3)
MONOCYTES NFR BLD AUTO: 5 %
NEUTROPHILS # BLD AUTO: 3.6 10E3/UL (ref 1.6–8.3)
NEUTROPHILS NFR BLD AUTO: 77 %
NRBC # BLD AUTO: 0 10E3/UL
NRBC BLD AUTO-RTO: 0 /100
PF4 HEPARIN CMPLX AB SER QL: NEGATIVE
PHOSPHATE SERPL-MCNC: 2.6 MG/DL (ref 2.5–4.5)
PLAT MORPH BLD: ABNORMAL
PLATELET # BLD AUTO: 34 10E3/UL (ref 150–450)
POTASSIUM SERPL-SCNC: 3.8 MMOL/L (ref 3.4–5.3)
PROT SERPL-MCNC: 3.7 G/DL (ref 6.4–8.3)
RBC # BLD AUTO: 2.57 10E6/UL (ref 3.8–5.2)
RBC MORPH BLD: ABNORMAL
SODIUM SERPL-SCNC: 141 MMOL/L (ref 135–145)
TARGETS BLD QL SMEAR: SLIGHT
WBC # BLD AUTO: 4.7 10E3/UL (ref 4–11)

## 2025-03-18 PROCEDURE — 99233 SBSQ HOSP IP/OBS HIGH 50: CPT | Performed by: PSYCHIATRY & NEUROLOGY

## 2025-03-18 PROCEDURE — 84100 ASSAY OF PHOSPHORUS: CPT | Performed by: STUDENT IN AN ORGANIZED HEALTH CARE EDUCATION/TRAINING PROGRAM

## 2025-03-18 PROCEDURE — 258N000003 HC RX IP 258 OP 636: Performed by: STUDENT IN AN ORGANIZED HEALTH CARE EDUCATION/TRAINING PROGRAM

## 2025-03-18 PROCEDURE — 250N000013 HC RX MED GY IP 250 OP 250 PS 637: Performed by: STUDENT IN AN ORGANIZED HEALTH CARE EDUCATION/TRAINING PROGRAM

## 2025-03-18 PROCEDURE — 80053 COMPREHEN METABOLIC PANEL: CPT

## 2025-03-18 PROCEDURE — 250N000013 HC RX MED GY IP 250 OP 250 PS 637: Performed by: PHYSICIAN ASSISTANT

## 2025-03-18 PROCEDURE — 97535 SELF CARE MNGMENT TRAINING: CPT | Mod: GO

## 2025-03-18 PROCEDURE — 99233 SBSQ HOSP IP/OBS HIGH 50: CPT | Performed by: STUDENT IN AN ORGANIZED HEALTH CARE EDUCATION/TRAINING PROGRAM

## 2025-03-18 PROCEDURE — 120N000002 HC R&B MED SURG/OB UMMC

## 2025-03-18 PROCEDURE — 99418 PROLNG IP/OBS E/M EA 15 MIN: CPT | Performed by: PSYCHIATRY & NEUROLOGY

## 2025-03-18 PROCEDURE — 99232 SBSQ HOSP IP/OBS MODERATE 35: CPT | Performed by: PHYSICIAN ASSISTANT

## 2025-03-18 PROCEDURE — 85018 HEMOGLOBIN: CPT

## 2025-03-18 PROCEDURE — 86022 PLATELET ANTIBODIES: CPT | Performed by: INTERNAL MEDICINE

## 2025-03-18 PROCEDURE — 250N000011 HC RX IP 250 OP 636: Mod: JZ | Performed by: PHYSICIAN ASSISTANT

## 2025-03-18 PROCEDURE — 83735 ASSAY OF MAGNESIUM: CPT | Performed by: STUDENT IN AN ORGANIZED HEALTH CARE EDUCATION/TRAINING PROGRAM

## 2025-03-18 RX ORDER — OXYCODONE HYDROCHLORIDE 5 MG/1
5 TABLET ORAL EVERY 4 HOURS PRN
Status: DISCONTINUED | OUTPATIENT
Start: 2025-03-18 | End: 2025-03-19

## 2025-03-18 RX ORDER — OXYCODONE HYDROCHLORIDE 10 MG/1
10 TABLET ORAL
Status: DISCONTINUED | OUTPATIENT
Start: 2025-03-18 | End: 2025-03-18

## 2025-03-18 RX ORDER — MAGNESIUM OXIDE 400 MG/1
400 TABLET ORAL EVERY 4 HOURS
Status: COMPLETED | OUTPATIENT
Start: 2025-03-18 | End: 2025-03-18

## 2025-03-18 RX ORDER — OXYCODONE HYDROCHLORIDE 5 MG/1
5 TABLET ORAL
Status: DISCONTINUED | OUTPATIENT
Start: 2025-03-18 | End: 2025-03-18

## 2025-03-18 RX ORDER — FONDAPARINUX SODIUM 7.5 MG/.6ML
7.5 INJECTION SUBCUTANEOUS EVERY 24 HOURS
Status: DISCONTINUED | OUTPATIENT
Start: 2025-03-18 | End: 2025-03-18

## 2025-03-18 RX ORDER — ACETAMINOPHEN 325 MG/1
975 TABLET ORAL EVERY 8 HOURS
Status: DISCONTINUED | OUTPATIENT
Start: 2025-03-18 | End: 2025-03-20

## 2025-03-18 RX ADMIN — OXYCODONE HYDROCHLORIDE 10 MG: 5 SOLUTION ORAL at 00:14

## 2025-03-18 RX ADMIN — Medication 2.5 MG: at 19:46

## 2025-03-18 RX ADMIN — HYDROMORPHONE HYDROCHLORIDE 0.5 MG: 1 INJECTION, SOLUTION INTRAMUSCULAR; INTRAVENOUS; SUBCUTANEOUS at 09:39

## 2025-03-18 RX ADMIN — Medication 2.5 MG: at 08:42

## 2025-03-18 RX ADMIN — THIAMINE HCL TAB 100 MG 100 MG: 100 TAB at 08:42

## 2025-03-18 RX ADMIN — Medication 1 SPRAY: at 08:55

## 2025-03-18 RX ADMIN — OXYCODONE HYDROCHLORIDE 5 MG: 5 TABLET ORAL at 19:46

## 2025-03-18 RX ADMIN — ACETAMINOPHEN 975 MG: 160 SOLUTION ORAL at 00:14

## 2025-03-18 RX ADMIN — Medication 1 SPRAY: at 19:46

## 2025-03-18 RX ADMIN — MAGNESIUM OXIDE TAB 400 MG (241.3 MG ELEMENTAL MG) 400 MG: 400 (241.3 MG) TAB at 08:42

## 2025-03-18 RX ADMIN — HYDROMORPHONE HYDROCHLORIDE 0.5 MG: 1 INJECTION, SOLUTION INTRAMUSCULAR; INTRAVENOUS; SUBCUTANEOUS at 16:14

## 2025-03-18 RX ADMIN — MAGNESIUM OXIDE TAB 400 MG (241.3 MG ELEMENTAL MG) 400 MG: 400 (241.3 MG) TAB at 12:58

## 2025-03-18 RX ADMIN — HYDROMORPHONE HYDROCHLORIDE 0.5 MG: 1 INJECTION, SOLUTION INTRAMUSCULAR; INTRAVENOUS; SUBCUTANEOUS at 22:04

## 2025-03-18 RX ADMIN — DEXTROSE AND SODIUM CHLORIDE: 10; .45 INJECTION, SOLUTION INTRAVENOUS at 15:53

## 2025-03-18 RX ADMIN — ACETAMINOPHEN 325 MG: 325 TABLET, FILM COATED ORAL at 11:30

## 2025-03-18 RX ADMIN — ACETAMINOPHEN 975 MG: 325 TABLET, FILM COATED ORAL at 19:02

## 2025-03-18 RX ADMIN — DEXTROSE AND SODIUM CHLORIDE: 10; .45 INJECTION, SOLUTION INTRAVENOUS at 01:50

## 2025-03-18 ASSESSMENT — ACTIVITIES OF DAILY LIVING (ADL)
ADLS_ACUITY_SCORE: 94
ADLS_ACUITY_SCORE: 96
ADLS_ACUITY_SCORE: 94
ADLS_ACUITY_SCORE: 96
ADLS_ACUITY_SCORE: 94
ADLS_ACUITY_SCORE: 96

## 2025-03-18 NOTE — PLAN OF CARE
Goal Outcome Evaluation:      Plan of Care Reviewed With: patient    Overall Patient Progress: no changeOverall Patient Progress: no change    Temp: 96  F (35.6  C) Temp src: Temporal BP: (!) 146/56 Pulse: 76   Resp: 20 SpO2: 100 % O2 Device: None (Room air)      Pt is A&O X2, disoriented to time and situation and intermittently confused and paranoid. Pt was more compliant with cares as the day progressed, but still required a lot of encouragement. VSS, on RA. Sacrum and LE pain adequately relieved with current pain regiment. Sacral and suprapubic wound cares completed per order. Suprapubic continuous to leak at the insertion site, dressing changed multiple times. BG Q4hrs, pt had a BG of 67 at 1600, MD notified. D10% + .45% NaCl rate increased to 100mL/hr. M.7, replaced. Recheck tomorrow morning. On regular diet, poor appetite. Pt was able to be feed by daughter few bites for dinner.

## 2025-03-18 NOTE — CONSULTS
"          Psychiatry Consultation; Follow up              Reason for Consult, requesting source:    Depression. She has been seen by psychiatry a number of times in recent months, most recently 3/11  Requesting source: Deepali Negrete    Labs and imaging reviewed, discussed with nursing and RNCC.     Total time spent in chart review, patient interview and coordination of care; 65 min            Interim history:    From medicine note 3/17:   \"\"Aranza\" Alis Hartman is a 69 year old woman admitted on 2/5/2025. 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  Hospital course complicated by MADHU (resolved) and hypervolemia, acute on chronic pain, difficult neurocognitive status/likely dementia and psychiatric history, decreased oral intake with severe malnutrition and ongoing discussion of goals of care.  Current medical plan is to continue managing pain and schizophrenia, and reevaluate goals of care this week. Planned for 3/20.\"  Per palliative note 3/17:   \"Aranza was reportedly more alert this morning and able to state preferences about her treatment (declined having suprapubic catheter changed, declined CT scan, doesn't want to be in the hospital, doesn't want to eat). Our team saw her twice today and while she was more alert this afternoon, she was not able to answer our questions. Given fluctuating mentation, suspect Aranza has limited capacity for complex decision making and will continue to require family support.\"    From my note 3/11:   \"During my visit today Aranza was slumped over in bed with her head down. She " "had asked for help sitting up, but even with the assistance of nursing we were unable to get her to sit up.   She denied being depressed, denied and voices and I could not elicit any paranoia symptoms.   She could not answer questions regarding orientation, etc.   I returned to see her later in the day and she was slumped over and would not respond to my questions.        Assessment:   I spoke with her daughter by phone about options for psych intervention. She is concerned that Aranza is frightened and hallucinating at times. Due to this and poor appetite she would be ok with us using low dose Zyprexa if it would help appetite, but she wants to see her again before we start anything.\"    She was started on Zyprexa 2.5 mg HS on the 13th   Per her nurse she has been minimally interactive, but with me she did say a few words like \"I want to get out of here; I hate it\". Although she looks very depressed she denies being depressed, just sad about being stuck in the hospital.I also her about voices or paranoia and she quietly admitted that this may be a problem. Is feeling helpless, no passive death wish. I asked about sleep, but she didn't answer, just turned away.         Current Medications:     Current Facility-Administered Medications   Medication Dose Route Frequency Provider Last Rate Last Admin    acetaminophen (TYLENOL) oral liquid 975 mg  975 mg Oral Q8H Deepali Negrete MD   975 mg at 03/18/25 0014    artificial saliva (BIOTENE MT) solution 1 spray  1 spray Mouth/Throat 4x Daily Salvador Dickerson MD   1 spray at 03/18/25 0855    buprenorphine (BUTRANS) 20 MCG/HR WK patch 1 patch  1 patch Transdermal Weekly Kamilah De La Torre PA-C   1 patch at 03/17/25 1757    buprenorphine (BUTRANS) Patch in Place   Transdermal Q8H Kamilah De La Torre PA-C        Lidocaine (LIDOCARE) 4 % Patch 1 patch  1 patch Transdermal Q24H Deepali Negrete MD   1 patch at 03/17/25 0832    magic mouthwash suspension (diphenhydramine, lidocaine, " "aluminum-magnesium & simethicone)  10 mL Swish & Spit TID AC Salvador Dickerson MD   10 mL at 03/17/25 1641    magnesium oxide (MAG-OX) tablet 400 mg  400 mg Oral Q4H Deepali Negrete MD   400 mg at 03/18/25 0842    OLANZapine zydis (zyPREXA) ODT half-tab 2.5 mg  2.5 mg Oral BID Deepali Negrete MD   2.5 mg at 03/18/25 0842    polyethylene glycol (MIRALAX) Packet 17 g  17 g Oral Daily Piter Webber MD   17 g at 03/15/25 0843    senna-docusate (SENOKOT-S/PERICOLACE) 8.6-50 MG per tablet 1 tablet  1 tablet Oral At Bedtime Piter Webber MD   1 tablet at 03/15/25 2105    sodium chloride (PF) 0.9% PF flush 10-20 mL  10-20 mL Intracatheter Q28 Days Deepali Negrete MD   10 mL at 03/13/25 1630    sodium chloride (PF) 0.9% PF flush 3 mL  3 mL Intracatheter Q8H Salvador Dickerson MD   3 mL at 03/16/25 0025    thiamine (B-1) tablet 100 mg  100 mg Oral or Feeding Tube Daily Deepali Negrete MD   100 mg at 03/18/25 0842              MSE:   Appearance: fatigued and cachectic  Attitude:  uncooperative  Eye Contact:  poor   Mood:  \"fair\"  Affect:  quite restricted   Speech:  sparse   Psychomotor Behavior:  no evidence of tardive dyskinesia, dystonia, or tics and physical retardation  Muscle strength and tone: intact   Thought Process:  sparse   Associations:  no loose associations  Thought Content:  no evidence of suicidal ideation or homicidal ideation, psychosis symptoms likely present   Insight:  limited  Judgement:  limited  Oriented to:  n/a  Attention Span and Concentration:  Unable to assess    Recent and Remote Memory:  unable to assess      MOCA 25/30 9/22    Vital signs:  Temp: 96  F (35.6  C) Temp src: Temporal BP: (!) 146/56 Pulse: 76   Resp: 20 SpO2: 100 % O2 Device: None (Room air) Oxygen Delivery: 2 LPM   Weight: 72.4 kg (159 lb 9.8 oz)  Estimated body mass index is 28.27 kg/m  as calculated from the following:    Height as of 2/5/25: 1.6 m (5' 3\").    Weight as of this encounter: 72.4 kg (159 lb 9.8 " "oz).    Qtc: 454 ms          DSM-5 Diagnosis:   Schizophrenia  RAFAEL   Unspecified depressive disorder   Unspecified neurocognitive Disorder: MOCA 25/30 9/22/22  Recent delirium          Assessment:   I think that it is likely that she is depressed, but I don't think that antidepressants would be of much help; it is primarily situational and I would be concerned about side effects (GI would be of concern).   She does appear to have symptoms of psychosis, so I think that the Zyprexa is needed, may make her more comfortable.   I understand that there is a care conference on the 20th; I don't think that psychiatric input is needed since this will primarily be directed at transition to comfort cares, no psychiatric issues (but contact me if I need to be there)..           Summary of Recommendations:   Would continue Zyprexa at 2.5 mg HS. If she is not sleeping you could increase to 5 mg.     Contact me as needed.  Mian Serna M.D.   Consult Liaison Psychiatry   [facility]   Contact on [facility]  If I am not available, then [facility] line ([phone]) should know who   Is covering our consult service.       \"Much or all of the text in this note was generated through the use of Dragon Dictate voice to text software. Errors in spelling or words which appear to be out of contact are unintentional, may be present due having escaped editing\"           "

## 2025-03-18 NOTE — PLAN OF CARE
Goal Outcome Evaluation:      Plan of Care Reviewed With: patient    Overall Patient Progress: no changeOverall Patient Progress: no change    Outcome Evaluation: 2300 - 0700    2300 - 0700    BP (!) 148/74 (BP Location: Left arm, Cuff Size: Adult Regular)   Pulse 65   Temp 96.1  F (35.6  C) (Temporal)   Resp 18   Wt 72.4 kg (159 lb 9.8 oz)   SpO2 100%   BMI 28.27 kg/m      Reason for admission: Admitted for increased confusion, poor appetite and increased generalized weakness. Course c/b MADHU, chronic pain, and severe malnutrition.  Activity: Assist x2 w/ lift. Q2hr repositioning.   Pain: Rated buttock pain a 10/10, given PRN Oxycodone x1.  Neuro: Disoriented to time, situation, and place. Confusion and intermittent illogical speech.   Cardiac: WDL, VSS w/ exception of htn within parameters.  Respiratory: WDL on RA  GI/: Suprapubic cath with minimal output, incontinent of bowel, last BM 3/17, denies nausea at this time.   Diet: Regular   Lines: Port   Wounds: Bilateral thighs, buttock, perineum, skin tears on all 4 extremities.   Labs/imaging: Plts 34, provider placed HIT orders.      Continue to monitor and follow POC

## 2025-03-18 NOTE — PROGRESS NOTES
"[facility]    Medicine Progress Note - Hospitalist Service, GOLD TEAM 8    Date of Admission:  3/4/2025    Assessment & Plan   \"Aranza\" Alis Hartman is a 69 year old woman admitted on 2/5/2025. 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  Hospital course complicated by MADHU, hypervolemia, acute on chronic pain, suspected dementia, and decreased oral intake with severe malnutrition. Current medical plan is to continue managing pain and schizophrenia, and reevaluate goals of care this week.    Interval updates:  - Platelet count dropped again this morning, HIT screening sent  - Holding anticoagulation with thrombocytopenia  - Psychiatry re-evaluated patient, recommending continuing current zyprexa  - Discussed with palliative care, they will follow up on her pain  - Updated daughter by phone  - Planning for care conference on 3/20     Severe Protein-Calorie Malnutrition   Decreased oral intake/Refusal to eat   Hypoglycemia  Hypernatremia, likely secondary to decreased oral intake, resolved  Hypokalemia, likely secondary to decreased oral intake  Patient has been refusing to eat, has not had good nutrition for several days.  Calorie counts 3/11 to 3/15 showed 0 intake.  Likely secondary to difficult to manage psychiatric history, component of dementia.  Hypernatremia resolved with D5W. Attempted XR feeding tube placement 3/14. unable to be placed due to patient disorientation and difficulty laying flat (details in radiology note). Ongoing discussions with patient's daughter given inability to place feeding " tube.  - Can continue to encourage PO intake  - Glucose checks q4h  - Continue D10  - Nutrition consulted, appreciate assistance    Mixed etiology cognitive impairment: contributions from delirium, metabolic encephalopathy, dementia, schizophrenia  On admission patient's family was concerned about her confusion. Patient has paranoia with suspected hallucinations. Acute worsening was likely related to possible UTI v/s dehydration lack of PO. Psychiatry consulted, saw patient 3/11, patient denied depression, hallucinations and psych could not elicit any paranoia symptoms.   - Continue zyprexa 2.5 mg twice daily (started 3/13)  - Delirium precautions    Acute on Chronic Pain  Thigh, low back pain related to her coccyx.  - Palliative care following, appreciate assistance  - Oxycodone 5-10mg q3h   - IV dilaudid 0.5 mg q2h prn for breakthrough pain   - Lidocaine patch  - Buprenorphine patch started 3/13, dose increased 3/17   - Scheduled Tylenol    Acute on chronic anemia likely secondary to chronic disease  Thrombocytopenia, concern for HIT  Did require pRBC transfusion on 3/13. No signs of bleeding. Platelets have now decreased significantly from ~150 to <50.   - Holding lovenox/heparin  - HIT screening panel sent    MADHU, oliguric, likely secondary to poor oral intake/malnutrition (now resolved)  Anasarca  Dependent facial edema  Creatinine at baseline 0.8-0.9. Resolved with 100 g of 25% albumin on 3/12 and 3/13. Renal US normal.  - Nephrology consulted this admission, signed off 3/16  - Lymphedema consulted, appreciate assistance  - Strict I's and O's, daily weights  - Monitor BMP       Goals of care   Recurrent hospitalizations with progressive step-wise decline  There have been several conversations throughout hospitalization due to the patient's declining treatments like NG tube and CT scan. Her daughter is motivated to continue encouraging the patient to eat and accept treatments/workup.  - Care conference on 3/20  with medicine, palliative care, ethics, and oncology    Sacral pressure ulcer  Lower extremity sores  Wound around suprapubic catheter site   Wound likely from chronic moisture dermatitis and history of radiation at that site. CRP elevated, likely due to inflammation in the setting chronic wounds. Trended down without antibiotics.  No leukocytosis, afebrile, hemodynamically stable.  Low concern for cellulitis.  - WOC consulted  - Monitor for now, if febrile will start antibiotics  - Urology recommends gauze or drain sponge around the site    Afib   Rate controlled. Prior admission had risk/benefit discussion and discontinued AC due to vaginal bleeding.    Serous endometrial carcinoma  Follows w/ heme/onc through [place]. S/p SNEHA, BSO 07/2024 s/p cycle 3 carbo/taxel 10/15/2024, cycle 4 delayed in s/o recent admission, family ultimately decided to forgo any further treatment. During a care conference this admission, it was expressed that her cancer is likely to recur due to it being an aggressive type but when it will recur is unknown. However, should it recur GynOnc team expressed that because of her functional status (significant weakness) and malnutrition, treatment would not be recommended since that would worsen her health overall and benefits would not outweigh the risks.    Pyuria   Hx of ESBL urosepsis and bacteremia  Bladder dysfunction s/p cystostomy and suprapubic catheter  Recently admitted 11/26 - 12/8/2024 for ESBL urosepsis and bacteremia requiring ICU w/ left nephrostomy tube (removed 1/7) treated w/ ertapenem, returned 1/25-1/27 for concern for UTI but no clear infection at that time.  ID was consulted that admission and recommended avoiding frequent UA and UCx checks in future unless patient w/ symptoms of UTI. This admission increased confusion, poor appetite and increased generalized weakness, foul smelling urine. CT abdomen unremarkable. Not septic on presentation. UA showed growing [id number]  mixed elisa.  - Suprapubic catheter exchanged by urology on 3/17  - Pending UCx from 3.17            Diet: Combination Diet Regular Diet Adult  Snacks/Supplements Adult: Ensure Enlive; With Meals  Snacks/Supplements Adult: Boost Soothe; Between Meals    DVT Prophylaxis: Pneumatic Compression Devices  Shahid Catheter: Not present  Lines: PRESENT      Port a Cath 02/05/25 Single Lumen Right Chest wall-Site Assessment: WDL      Cardiac Monitoring: None  Code Status: Full Code      Clinically Significant Risk Factors          # Hyperchloremia: Highest Cl = 114 mmol/L in last 2 days, will monitor as appropriate          # Hypoalbuminemia: Lowest albumin = 2.3 g/dL at 3/12/2025  5:34 AM, will monitor as appropriate   # Thrombocytopenia: Lowest platelets = 34 in last 2 days, will monitor for bleeding   # Hypertension: Noted on problem list             # Severe Malnutrition: based on nutrition assessment and treatment provided per dietitian's recommendations.    # Financial/Environmental Concerns: none         Social Drivers of Health    Food Insecurity: Unknown (3/12/2025)    Food Insecurity     Within the past 12 months, did you worry that your food would run out before you got money to buy more?: Patient unable to answer     Within the past 12 months, did the food you bought just not last and you didn t have money to get more?: Patient unable to answer   Housing Stability: Unknown (3/12/2025)    Housing Stability     Do you have housing? : Patient unable to answer     Are you worried about losing your housing?: Patient unable to answer   Financial Resource Strain: Unknown (3/12/2025)    Financial Resource Strain     Within the past 12 months, have you or your family members you live with been unable to get utilities (heat, electricity) when it was really needed?: Patient unable to answer   Transportation Needs: Unknown (3/12/2025)    Transportation Needs     Within the past 12 months, has lack of transportation kept you  from medical appointments, getting your medicines, non-medical meetings or appointments, work, or from getting things that you need?: Patient unable to answer   Interpersonal Safety: Unknown (3/12/2025)    Interpersonal Safety     Do you feel physically and emotionally safe where you currently live?: Patient unable to answer     Within the past 12 months, have you been hit, slapped, kicked or otherwise physically hurt by someone?: Patient unable to answer     Within the past 12 months, have you been humiliated or emotionally abused in other ways by your partner or ex-partner?: Patient unable to answer          Disposition Plan     Medically Ready for Discharge: Anticipated in 5+ Days             Jolanta Limon MD  Hospitalist Service, GOLD TEAM 8  [facility]  Securely message with [employer] (more info)  Text page via [employer] Paging/Directory   See signed in provider for up to date coverage information  ______________________________________________________________________    Interval History   No acute events. Patient in pain this morning per RN report. Seen after she got pain meds and she does confirm for me that they helped. She did not talk to me at length, would only answer yes/no questions.     Physical Exam   Vital Signs: Temp: 96.1  F (35.6  C) Temp src: Temporal BP: (!) 148/74 Pulse: 65   Resp: 18 SpO2: 100 % O2 Device: None (Room air)    Weight: 159 lbs 9.81 oz    General Appearance: Sitting up in bed, appears in NAD  Respiratory: Breathing non-labored on RA, CTAB  Cardiovascular: RRR  GI: Soft, non-tender  Other: Bilateral LE with 3+ edema, lymphedema team working on dressings     Medical Decision Making               Data     I have personally reviewed the following data over the past 24 hrs:    4.7  \   7.9 (L)   / 34 (LL)     141 114 (H) 19.4 /  95   3.8 20 (L) 0.78 \     ALT: 22 AST: 33 AP: 114 TBILI: 0.4   ALB: 2.4 (L) TOT PROTEIN: 3.7 (L) LIPASE: N/A        Imaging results reviewed over the past 24 hrs:   Recent Results (from the past 24 hours)   CT Abdomen Pelvis w/o Contrast    Narrative    EXAM: CT ABDOMEN PELVIS W/O CONTRAST 3/17/2025 4:01 PM    HISTORY: 69 years Female abdominal pain, patient unable to further  characterize.    COMPARISON: CT abdomen/pelvis 3/4/2025.    TECHNIQUE: Axial CT images from the lung bases through the symphysis  pubis were obtained without IV contrast. Coronal and sagittal  reformats provided.     FINDINGS:    LINES/TUBES: Suprapubic catheter terminates in the bladder..    LUNG BASES: Small right and trace left pleural effusion. Right  middle/lower lobe dependent consolidation/atelectasis. Additional  probable compressive atelectasis in the left lung base. Left lower  lobe calcified granuloma. Multiple calcified mediastinal lymph nodes.  Mitral annular calcifications.    HEPATIC: No suspicious focal hepatic masses or lesions.    BILIARY: Cholelithiasis and sludge. No significant gallbladder wall  thickening or pericholecystic inflammatory change. No intra or  extrahepatic biliary dilatation.    SPLEEN: Multiple punctate splenic granulomas.    PANCREAS: No mass or peripancreatic fluid.    ADRENALS: Unremarkable.    RENAL: No hydronephrosis, calculi, or mass.    URINARY BLADDER: Decompressed with suprapubic catheter in place.    PELVIC ORGANS: Unremarkable.    GASTROINTESTINAL: Postoperative changes of gastric bypass. Small and  large bowel are normal in caliber and without abnormal wall  thickening. No portal venous gas or pneumatosis.     MESENTERY/PERITONEUM: No ascites or pneumoperitoneum.    LYMPH NODES: No lymphadenopathy.    VASCULAR: Bilateral common iliac to common femoral artery stents.  Scattered atherosclerotic calcifications.    MUSCULOSKELETAL: No acute or suspicious osseous lesion. Degenerative  change throughout the spine, hips, and sacroiliac joints. Diffuse  muscular atrophy and anasarca.      Impression    IMPRESSION:      1. Small/moderate and trace left pleural effusions with bilateral  lower lobe consolidations, right greater than left. Consolidations may  represent dependent/compressive atelectasis, though underlying  infection is not excluded.  2. Otherwise no acute findings in the abdomen or pelvis.  3. Third spacing of fluid with marked anasarca.    I have personally reviewed the examination and initial interpretation  and I agree with the findings.    FORREST RAMIREZ MD         SYSTEM ID:  [id number]

## 2025-03-18 NOTE — PROGRESS NOTES
Care Management Follow Up    Length of Stay (days): 14    Expected Discharge Date: 03/21/2025     Concerns to be Addressed:       Patient plan of care discussed at interdisciplinary rounds: Yes    Anticipated Discharge Disposition:                Anticipated Discharge Services:    Anticipated Discharge DME:      Patient/family educated on Medicare website which has current facility and service quality ratings:    Education Provided on the Discharge Plan:    Patient/Family in Agreement with the Plan:      Referrals Placed by CM/SW:    Private pay costs discussed: Not applicable    Discussed  Partnership in Safe Discharge Planning  document with patient/family: No     Handoff Completed: No, handoff not indicated or clinically appropriate    Additional Information:  RNCC notified Ethics has not been confirmed for requested care conference on Thursday. RNCC paged the ethics general pager with request.     RNCC confirmed Emily Lao can attend, requesting a call in option. RNCC will set this up. [email or web address]    Lisas STEPHEN coordinator  [phone]     [facility] Conferance  Thursday, March 20th at 1 pm.     Ethics - confirmed with Emily Lao  Palliative-confirmed with Dr. Kamilah De La Torre  Oncology-confirmed with Zahira Sims that someone from team would be present  Medicine- confirmed with Deepali Negrete    Next Steps: confirm ethics will attend CC, update Team as needed     LUÍS Licea  Care Management Department  Covering 5A: [phone] & 5C: [phone] (non-BMT)   Phone: [phone]  Teams  Vocera: weekdays 8:00 am - 4:30 pm.   See Vocera Care Team for off-day coverage

## 2025-03-18 NOTE — PROGRESS NOTES
"  PALLIATIVE CARE PROGRESS NOTE  [facility]     Patient Name: Alis Hartman  Date of Admission: 3/4/2025   Today the patient was seen for: goals of care     Recommendations & Counseling     GOALS OF CARE:   Life-prolonging without limits  Per discussions with providers last week, plan for 1 week time limited trial of feeding tube while treating agitation and pain symptoms with goal to see if these treatments lead to improved mentation and participation in cares that would enable a life prolonging treatment pathway. Unfortunately unable to place NG tube 3/14 due to confusion and inability to lay flat, continuing with trial of symptom management alone.  Plan for care conference Thursday 3/20, Palliative plans to participate. We have communicated to Joy that it's very important for her to be physically present for this meeting and to have other family members join (by phone if needed).    ADVANCE CARE PLANNING:  Previously completed HCD naming Aranza (daughter) as HCA, appears this was not dated and deemed \"invalid.\" Aranza confirmed during last admission that Joy is her surrogate decision maker per notes in chart.   There is a POLST form on file, this was reviewed and current.  Code status: Full Code     MEDICAL MANAGEMENT:   #Pain, chronic, low back and abdominal after surgery in July. On chronic prn opioids in varying doses x >10 years. Was down to 5mg oxycodone q 6 hours, but in recent month dose increased up to 20mg q 6 hours prn during last hospitalization.  #Pain, L thigh  #Pain, sacral/coccyx wound and B arm and leg wounds  Pain appears to be improving, requiring fewer doses of PRN opioids today. Plan to continue to decrease full agonist opioids with goal of increased alertness/improved mentation.  Butrans patch 20mcg/hr (increased 3/17)  Acetaminophen 975mg q8hr  Decreased oxycodone to 5mg q4h PRN (done)  Hydromorphone to 0.5mg q2 hr prn for pain not " "controlled with PO meds   Encourage bowel regimen     #Agitation and confusion  #Depressed mood  #History of schizophrenia  Olanzapine increased to 2.5mg/5mg at bedtime 3/18 per Psych  Requested  support     PSYCHOSOCIAL/SPIRITUAL:  Family: Daughter (Joy) and grandchildren, son (Hemanth), niece (Armida)  Joy mentions things being \"God's plan\", though specific [employer] needs/affiliations not addressed     Palliative Care will continue to follow.     Kamilah De La Torre PA-C  [facility], Palliative Care  Securely message with the Vocera Web Console (learn more here) or  Text page via [facility] Paging/Directory      Assessment          Alis Hartman is a 69 year old female with a past medical history of cervical cancer s/p radiation, seriuos adenocarcinoma s/p SNEHA/BSO and cystotomy w/ suprapubic catheter placement 07/2024 and 3 cycles of carbo/taxol (10/2024), hx ESBL urosepsis and bacteremia, RNY gastric bypass, afib on apixaban, HTN, CKD stage 3, and multiple recent admissions (most recently 2/5-2/25 with MADHU, deconditioning, and malnutrition), re-admitted 3/4 with concern for UTI and failure to thrive/general weakness. Palliative following for symptom management and goals of care.      Interval History:     Multidisciplinary collaboration:  Underwent CT A/P without acute findings  Labs notable for low platelets, drifting down over several days. HIT panel ordered    Notable medications:  Acetaminophen 975 mg q8h (taking as scheduled)  Oxycodone 5-10mg q3h PRN - total 45mg past 24h  Butrans 20mcg/hr (increased 3/17)  Hydromorphone IV 0.5mg q2h PRN - x1 in past 24 hr  Lidocaine patch  Senna/miralax - not taking but having frequent loose stool  D5 0.45% NaCl infusion  Zyprexa 2.5mg BID    Patient/family narrative  Aranza is laying in bed on right side, eyes open and alert. Requests to be repositioned. Has a mostly eaten container of mashed potatoes on her tray. Reports pain is \"fine,\" remains localized to legs " and buttocks.     Physical Exam:   Temp:  [95.1  F (35.1  C)-96.1  F (35.6  C)] 96  F (35.6  C)  Pulse:  [65-76] 76  Resp:  [16-20] 20  BP: (125-148)/(56-79) 146/56  SpO2:  [100 %] 100 %  159 lbs 9.81 oz    Gen: Laying in bed in on right side, chronically ill appearing  Pulm: nonlabored breathing, comfortable on room air, no cough  MSK:  BLE edema  Neuro: eyes open and tracking, answering questions though difficult to understand due to mumbling        Data Reviewed:     Platelets 34 (downtrending since 3/11), hemoglobin stable    CT A/P 3/17  IMPRESSION:   1. Small/moderate and trace left pleural effusions with bilateral  lower lobe consolidations, right greater than left. Consolidations may  represent dependent/compressive atelectasis, though underlying  infection is not excluded.  2. Otherwise no acute findings in the abdomen or pelvis.  3. Third spacing of fluid with marked anasarca.    Medical Decision Making       MANAGEMENT DISCUSSED with the following over the past 24 hours: Medicine   NOTE(S)/MEDICAL RECORDS REVIEWED over the past 24 hours: Medicine  Medical complexity over the past 24 hours:  - Prescription DRUG MANAGEMENT performed

## 2025-03-18 NOTE — PROGRESS NOTES
Hospital Medicine / Cross Cover Note    Subjective  I was called ie / regarding low platelet(s) this morning - lower than her drifting down platelet(s) since 3/11/2025.  Appears that there has been no recent chemotherapy.  Still on enoxaparin (Lovenox ) though still on heparin flushes as well.  ? HIT.    Objective  BP (!) 148/74 (BP Location: Left arm, Cuff Size: Adult Regular)   Pulse 65   Temp 96.1  F (35.6  C) (Temporal)   Resp 18   Wt 72.4 kg (159 lb 9.8 oz)   SpO2 100%   BMI 28.27 kg/m    Not otherwise examined    Assessment  Precipitous dropping platelet(s) count for no apparent reason - no recent chemotherapy and other cell lines are not affected in the same time course.  This makes me wonder about heparin-induced thrombocytopenia (HIT).    Plan  I've stopped the heparin flushes and ordered the HIT panel.  Stop the enoxaparin (Lovenox ).  HIT panel ordered - noting that the 4T score appears 4-5 (intermediate risk).    Javi Dickerson MD  Hospitalist

## 2025-03-18 NOTE — PROVIDER NOTIFICATION
Paged Urology on [employer]:  Pt's suprapubic catheter was changed today but it is still leaking a moderate amount.   Sonia [phone]    Outcome: provider stated the catheter will leak and we just need to complete dressing changes frequently.

## 2025-03-19 ENCOUNTER — APPOINTMENT (OUTPATIENT)
Dept: OCCUPATIONAL THERAPY | Facility: CLINIC | Age: 70
End: 2025-03-19
Payer: COMMERCIAL

## 2025-03-19 LAB
ALBUMIN SERPL BCG-MCNC: 2.2 G/DL (ref 3.5–5.2)
ALP SERPL-CCNC: 115 U/L (ref 40–150)
ALT SERPL W P-5'-P-CCNC: 21 U/L (ref 0–50)
ANION GAP SERPL CALCULATED.3IONS-SCNC: 8 MMOL/L (ref 7–15)
AST SERPL W P-5'-P-CCNC: 24 U/L (ref 0–45)
BASOPHILS # BLD AUTO: 0 10E3/UL (ref 0–0.2)
BASOPHILS NFR BLD AUTO: 0 %
BILIRUB SERPL-MCNC: 0.4 MG/DL
BUN SERPL-MCNC: 18.5 MG/DL (ref 8–23)
CALCIUM SERPL-MCNC: 8.1 MG/DL (ref 8.8–10.4)
CHLORIDE SERPL-SCNC: 111 MMOL/L (ref 98–107)
CREAT SERPL-MCNC: 0.79 MG/DL (ref 0.51–0.95)
EGFRCR SERPLBLD CKD-EPI 2021: 80 ML/MIN/1.73M2
EOSINOPHIL # BLD AUTO: 0 10E3/UL (ref 0–0.7)
EOSINOPHIL NFR BLD AUTO: 0 %
ERYTHROCYTE [DISTWIDTH] IN BLOOD BY AUTOMATED COUNT: 25.8 % (ref 10–15)
GLUCOSE BLDC GLUCOMTR-MCNC: 109 MG/DL (ref 70–99)
GLUCOSE BLDC GLUCOMTR-MCNC: 111 MG/DL (ref 70–99)
GLUCOSE BLDC GLUCOMTR-MCNC: 116 MG/DL (ref 70–99)
GLUCOSE BLDC GLUCOMTR-MCNC: 122 MG/DL (ref 70–99)
GLUCOSE SERPL-MCNC: 127 MG/DL (ref 70–99)
HCO3 SERPL-SCNC: 20 MMOL/L (ref 22–29)
HCT VFR BLD AUTO: 24.4 % (ref 35–47)
HGB BLD-MCNC: 7.9 G/DL (ref 11.7–15.7)
IMM GRANULOCYTES # BLD: 0 10E3/UL
IMM GRANULOCYTES NFR BLD: 0 %
LYMPHOCYTES # BLD AUTO: 0.6 10E3/UL (ref 0.8–5.3)
LYMPHOCYTES NFR BLD AUTO: 14 %
MAGNESIUM SERPL-MCNC: 1.8 MG/DL (ref 1.7–2.3)
MCH RBC QN AUTO: 30.3 PG (ref 26.5–33)
MCHC RBC AUTO-ENTMCNC: 32.4 G/DL (ref 31.5–36.5)
MCV RBC AUTO: 94 FL (ref 78–100)
MONOCYTES # BLD AUTO: 0.1 10E3/UL (ref 0–1.3)
MONOCYTES NFR BLD AUTO: 3 %
NEUTROPHILS # BLD AUTO: 3.7 10E3/UL (ref 1.6–8.3)
NEUTROPHILS NFR BLD AUTO: 82 %
NRBC # BLD AUTO: 0 10E3/UL
NRBC BLD AUTO-RTO: 0 /100
PHOSPHATE SERPL-MCNC: 2.6 MG/DL (ref 2.5–4.5)
PLATELET # BLD AUTO: 26 10E3/UL (ref 150–450)
POTASSIUM SERPL-SCNC: 3.6 MMOL/L (ref 3.4–5.3)
PROT SERPL-MCNC: 3.6 G/DL (ref 6.4–8.3)
RBC # BLD AUTO: 2.61 10E6/UL (ref 3.8–5.2)
SODIUM SERPL-SCNC: 139 MMOL/L (ref 135–145)
WBC # BLD AUTO: 4.4 10E3/UL (ref 4–11)

## 2025-03-19 PROCEDURE — 120N000002 HC R&B MED SURG/OB UMMC

## 2025-03-19 PROCEDURE — 85049 AUTOMATED PLATELET COUNT: CPT

## 2025-03-19 PROCEDURE — 99233 SBSQ HOSP IP/OBS HIGH 50: CPT | Performed by: STUDENT IN AN ORGANIZED HEALTH CARE EDUCATION/TRAINING PROGRAM

## 2025-03-19 PROCEDURE — 250N000013 HC RX MED GY IP 250 OP 250 PS 637: Performed by: STUDENT IN AN ORGANIZED HEALTH CARE EDUCATION/TRAINING PROGRAM

## 2025-03-19 PROCEDURE — 97535 SELF CARE MNGMENT TRAINING: CPT | Mod: GO

## 2025-03-19 PROCEDURE — 250N000011 HC RX IP 250 OP 636: Mod: JZ | Performed by: PHYSICIAN ASSISTANT

## 2025-03-19 PROCEDURE — 84100 ASSAY OF PHOSPHORUS: CPT | Performed by: STUDENT IN AN ORGANIZED HEALTH CARE EDUCATION/TRAINING PROGRAM

## 2025-03-19 PROCEDURE — 258N000003 HC RX IP 258 OP 636: Performed by: STUDENT IN AN ORGANIZED HEALTH CARE EDUCATION/TRAINING PROGRAM

## 2025-03-19 PROCEDURE — 97535 SELF CARE MNGMENT TRAINING: CPT | Mod: GO | Performed by: STUDENT IN AN ORGANIZED HEALTH CARE EDUCATION/TRAINING PROGRAM

## 2025-03-19 PROCEDURE — 80053 COMPREHEN METABOLIC PANEL: CPT

## 2025-03-19 PROCEDURE — 250N000011 HC RX IP 250 OP 636: Mod: JZ

## 2025-03-19 PROCEDURE — 83735 ASSAY OF MAGNESIUM: CPT | Performed by: STUDENT IN AN ORGANIZED HEALTH CARE EDUCATION/TRAINING PROGRAM

## 2025-03-19 PROCEDURE — 99232 SBSQ HOSP IP/OBS MODERATE 35: CPT | Performed by: PHYSICIAN ASSISTANT

## 2025-03-19 RX ORDER — HYDROMORPHONE HYDROCHLORIDE 1 MG/ML
0.5 INJECTION, SOLUTION INTRAMUSCULAR; INTRAVENOUS; SUBCUTANEOUS EVERY 6 HOURS PRN
Status: DISCONTINUED | OUTPATIENT
Start: 2025-03-19 | End: 2025-03-21

## 2025-03-19 RX ORDER — LINEZOLID 600 MG/1
600 TABLET, FILM COATED ORAL EVERY 12 HOURS SCHEDULED
Status: DISCONTINUED | OUTPATIENT
Start: 2025-03-19 | End: 2025-03-20

## 2025-03-19 RX ORDER — MAGNESIUM OXIDE 400 MG/1
400 TABLET ORAL EVERY 4 HOURS
Status: COMPLETED | OUTPATIENT
Start: 2025-03-19 | End: 2025-03-19

## 2025-03-19 RX ORDER — OXYCODONE HYDROCHLORIDE 5 MG/1
5 TABLET ORAL
Status: DISCONTINUED | OUTPATIENT
Start: 2025-03-19 | End: 2025-03-20

## 2025-03-19 RX ADMIN — HYDROMORPHONE HYDROCHLORIDE 0.5 MG: 1 INJECTION, SOLUTION INTRAMUSCULAR; INTRAVENOUS; SUBCUTANEOUS at 20:36

## 2025-03-19 RX ADMIN — DEXTROSE AND SODIUM CHLORIDE: 10; .45 INJECTION, SOLUTION INTRAVENOUS at 23:50

## 2025-03-19 RX ADMIN — DEXTROSE AND SODIUM CHLORIDE: 10; .45 INJECTION, SOLUTION INTRAVENOUS at 14:26

## 2025-03-19 RX ADMIN — ACETAMINOPHEN 975 MG: 325 TABLET, FILM COATED ORAL at 09:20

## 2025-03-19 RX ADMIN — THIAMINE HCL TAB 100 MG 100 MG: 100 TAB at 09:21

## 2025-03-19 RX ADMIN — Medication 1 SPRAY: at 11:29

## 2025-03-19 RX ADMIN — Medication 1 SPRAY: at 09:22

## 2025-03-19 RX ADMIN — MAGNESIUM OXIDE TAB 400 MG (241.3 MG ELEMENTAL MG) 400 MG: 400 (241.3 MG) TAB at 09:21

## 2025-03-19 RX ADMIN — Medication 2.5 MG: at 09:21

## 2025-03-19 RX ADMIN — MAGNESIUM OXIDE TAB 400 MG (241.3 MG ELEMENTAL MG) 400 MG: 400 (241.3 MG) TAB at 11:28

## 2025-03-19 RX ADMIN — DEXTROSE AND SODIUM CHLORIDE: 10; .45 INJECTION, SOLUTION INTRAVENOUS at 02:55

## 2025-03-19 RX ADMIN — HYDROMORPHONE HYDROCHLORIDE 0.5 MG: 1 INJECTION, SOLUTION INTRAMUSCULAR; INTRAVENOUS; SUBCUTANEOUS at 02:36

## 2025-03-19 ASSESSMENT — ACTIVITIES OF DAILY LIVING (ADL)
ADLS_ACUITY_SCORE: 98
ADLS_ACUITY_SCORE: 96
ADLS_ACUITY_SCORE: 96
ADLS_ACUITY_SCORE: 94
ADLS_ACUITY_SCORE: 94
ADLS_ACUITY_SCORE: 98
ADLS_ACUITY_SCORE: 96
ADLS_ACUITY_SCORE: 98
ADLS_ACUITY_SCORE: 94
ADLS_ACUITY_SCORE: 96
ADLS_ACUITY_SCORE: 94
ADLS_ACUITY_SCORE: 96
ADLS_ACUITY_SCORE: 98
ADLS_ACUITY_SCORE: 96
ADLS_ACUITY_SCORE: 96
ADLS_ACUITY_SCORE: 98
ADLS_ACUITY_SCORE: 94
ADLS_ACUITY_SCORE: 96
ADLS_ACUITY_SCORE: 98

## 2025-03-19 NOTE — PLAN OF CARE
Goal Outcome Evaluation:      Plan of Care Reviewed With: patient    Overall Patient Progress: no changeOverall Patient Progress: no change    Outcome Evaluation: 2300 - 0700    2300 - 0700    /61 (BP Location: Right arm)   Pulse 70   Temp 97.4  F (36.3  C) (Axillary)   Resp 16   Wt 72.4 kg (159 lb 9.8 oz)   SpO2 95%   BMI 28.27 kg/m      Reason for admission: Admitted for increased confusion, poor appetite and increased generalized weakness. Course c/b MADHU, chronic pain, and severe malnutrition.  Activity: Assist x2 w/ lift. Q2hr repositioning, intermittent refusal.   Pain: Rated buttock pain a 8/10, given PRN Dilaudid x1.  Neuro: Disoriented to time and situation. Confusion and intermittent illogical speech.   Cardiac: WDL, VSS w/ exception of htn within parameters.  Respiratory: WDL on RA  GI/: Suprapubic cath with minimal output, incontinent of bowel, last BM 3/19, denies nausea at this time. BG q4hr.  Diet: Regular   Lines: Port   Wounds: Bilateral thighs, buttock, perineum, skin tears on all 4 extremities.   Labs/imaging: AM Platelet count is 26, provider aware     Continue to monitor and follow POC

## 2025-03-19 NOTE — PROGRESS NOTES
"[facility]    Medicine Progress Note - Hospitalist Service, GOLD TEAM 8    Date of Admission:  3/4/2025    Assessment & Plan   \"Aranza\" Alis Hartman is a 69 year old woman admitted on 2/5/2025. 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  Hospital course complicated by MADHU, hypervolemia, acute on chronic pain, suspected dementia, and decreased oral intake with severe malnutrition. Current medical plan is to continue managing pain and schizophrenia, and reevaluate goals of care this week.    Interval updates:  - UCx growing VRE, difficult to assess symptoms due to patient's mentation, starting linezolid to cover this  - IV dilaudid stopped per palliative care, remains on oxycodone for pain control  - Care conference planned for tomorrow afternoon    Severe Protein-Calorie Malnutrition   Decreased oral intake/Refusal to eat   Hypoglycemia  Hypernatremia, likely secondary to decreased oral intake, resolved  Hypokalemia, likely secondary to decreased oral intake  Patient has been refusing to eat, has not had good nutrition for several days.  Calorie counts 3/11 to 3/15 showed 0 intake.  Likely secondary to difficult to manage psychiatric history, component of dementia.  Hypernatremia resolved with D5W. Attempted XR feeding tube placement 3/14. unable to be placed due to patient disorientation and difficulty laying flat (details in radiology note). Ongoing discussions with patient's daughter given inability to place feeding tube.  - Can continue to encourage PO intake  - Glucose checks q4h  - Continue D10  - " Nutrition consulted, appreciate assistance    Mixed etiology cognitive impairment: contributions from delirium, metabolic encephalopathy, dementia, schizophrenia  On admission patient's family was concerned about her confusion. Patient has paranoia with suspected hallucinations. Acute worsening was likely related to possible UTI v/s dehydration lack of PO. Psychiatry consulted, saw patient 3/11, patient denied depression, hallucinations and psych could not elicit any paranoia symptoms.   - Continue zyprexa 2.5 mg twice daily (started 3/13)  - Delirium precautions    Acute on Chronic Pain  Thigh, low back pain related to her coccyx.  - Palliative care following, appreciate assistance  - Oxycodone 5mg q3h   - IV dilaudid stopped 3/19  - Lidocaine patch  - Buprenorphine patch started 3/13, dose increased 3/17   - Scheduled Tylenol    Acute on chronic anemia likely secondary to chronic disease  Thrombocytopenia, HIT ruled out  Did require pRBC transfusion on 3/13. No signs of bleeding. Platelets have now decreased significantly from ~150 to <50. There was concern for HIT, but screening panel returned negative.  - Holding lovenox/heparin    MADHU, oliguric, likely secondary to poor oral intake/malnutrition (now resolved)  Anasarca  Dependent facial edema  Creatinine at baseline 0.8-0.9. Resolved with 100 g of 25% albumin on 3/12 and 3/13. Renal US normal.  - Nephrology consulted this admission, signed off 3/16  - Lymphedema consulted, appreciate assistance  - Strict I's and O's, daily weights  - Monitor BMP       Goals of care   Recurrent hospitalizations with progressive step-wise decline  There have been several conversations throughout hospitalization due to the patient's declining treatments like NG tube and CT scan. Her daughter is motivated to continue encouraging the patient to eat and accept treatments/workup.  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 CRP elevated, likely due to inflammation in the setting chronic wounds. Trended down without antibiotics.  No leukocytosis, afebrile, hemodynamically stable.  Low concern for cellulitis.  - WOC consulted  - Monitor for now, if febrile will start antibiotics  - Urology recommends gauze or drain sponge around the site    Afib   Rate controlled. Prior admission had risk/benefit discussion and discontinued AC due to vaginal bleeding.    Serous endometrial carcinoma  Follows w/ heme/onc through [facility]. S/p SNEHA, BSO 07/2024 s/p cycle 3 carbo/taxel 10/15/2024, cycle 4 delayed in s/o recent admission, family ultimately decided to forgo any further treatment. During a care conference this admission, it was expressed that her cancer is likely to recur due to it being an aggressive type but when it will recur is unknown. However, should it recur GynOnc team expressed that because of her functional status (significant weakness) and malnutrition, treatment would not be recommended since that would worsen her health overall and benefits would not outweigh the risks.    Pyuria, VRE in urine culture   Hx of ESBL urosepsis and bacteremia  Bladder dysfunction s/p cystostomy and suprapubic catheter  Recently admitted 11/26 - 12/8/2024 for ESBL urosepsis and bacteremia requiring ICU w/ left nephrostomy tube (removed 1/7) treated w/ ertapenem, returned 1/25-1/27 for concern for UTI but no clear infection at that time. Urology collected UA during SPC exchange on 3/17 due to turbid urine and this is now growing VRE. Difficult to assess symptoms due to patient's mentation, so will err on the side of treating.  - Suprapubic catheter exchanged by urology on 3/17  - Start linezolid for VRE  - Follow up final culture/susceptibilities            Diet: Combination Diet Regular Diet  Adult  Snacks/Supplements Adult: Ensure Enlive; With Meals  Snacks/Supplements Adult: Boost Soothe; Between Meals    DVT Prophylaxis: Pneumatic Compression Devices  Shahid Catheter: Not present  Lines: PRESENT      Port a Cath 02/05/25 Single Lumen Right Chest wall-Site Assessment: WDL      Cardiac Monitoring: None  Code Status: Full Code      Clinically Significant Risk Factors          # Hyperchloremia: Highest Cl = 114 mmol/L in last 2 days, will monitor as appropriate          # Hypoalbuminemia: Lowest albumin = 2.2 g/dL at 3/19/2025  2:38 AM, will monitor as appropriate   # Thrombocytopenia: Lowest platelets = 26 in last 2 days, will monitor for bleeding   # Hypertension: Noted on problem list             # Severe Malnutrition: based on nutrition assessment and treatment provided per dietitian's recommendations.    # Financial/Environmental Concerns: none         Social Drivers of Health    Food Insecurity: Unknown (3/12/2025)    Food Insecurity     Within the past 12 months, did you worry that your food would run out before you got money to buy more?: Patient unable to answer     Within the past 12 months, did the food you bought just not last and you didn t have money to get more?: Patient unable to answer   Housing Stability: Unknown (3/12/2025)    Housing Stability     Do you have housing? : Patient unable to answer     Are you worried about losing your housing?: Patient unable to answer   Financial Resource Strain: Unknown (3/12/2025)    Financial Resource Strain     Within the past 12 months, have you or your family members you live with been unable to get utilities (heat, electricity) when it was really needed?: Patient unable to answer   Transportation Needs: Unknown (3/12/2025)    Transportation Needs     Within the past 12 months, has lack of transportation kept you from medical appointments, getting your medicines, non-medical meetings or appointments, work, or from getting things that you need?:  Patient unable to answer   Interpersonal Safety: Unknown (3/12/2025)    Interpersonal Safety     Do you feel physically and emotionally safe where you currently live?: Patient unable to answer     Within the past 12 months, have you been hit, slapped, kicked or otherwise physically hurt by someone?: Patient unable to answer     Within the past 12 months, have you been humiliated or emotionally abused in other ways by your partner or ex-partner?: Patient unable to answer          Disposition Plan     Medically Ready for Discharge: Anticipated in 2-4 Days             Jolanta Limon MD  Hospitalist Service, GOLD TEAM 8  [facility]  Securely message with [employer] (more info)  Text page via [facility] Paging/Directory   See signed in provider for up to date coverage information  ______________________________________________________________________    Interval History   No acute events. Frequently was given IV dilaudid instead of oxycodone. This morning denies pain after receiving tylenol from RN shortly before my visit. Otherwise largely non-interactive today.    Physical Exam   Vital Signs: Temp: 97.4  F (36.3  C) Temp src: Axillary BP: 117/61 Pulse: 70   Resp: 16 SpO2: 95 % O2 Device: None (Room air)    Weight: 159 lbs 9.81 oz    General Appearance: Sitting up in bed, NAD  Respiratory: Breathing non-labored on RA, CTAB  Cardiovascular: RRR  GI: Soft, non-tender  Other: Minimally responding to questions today     Medical Decision Making               Data     I have personally reviewed the following data over the past 24 hrs:    4.4  \   7.9 (L)   / 26 (LL)     139 111 (H) 18.5 /  109 (H)   3.6 20 (L) 0.79 \     ALT: 21 AST: 24 AP: 115 TBILI: 0.4   ALB: 2.2 (L) TOT PROTEIN: 3.6 (L) LIPASE: N/A       Imaging results reviewed over the past 24 hrs:   No results found for this or any previous visit (from the past 24 hours).

## 2025-03-19 NOTE — PROGRESS NOTES
"CLINICAL NUTRITION SERVICES - REASSESSMENT NOTE     RECOMMENDATIONS FOR MDs/PROVIDERS TO ORDER:  Ongoing nutrition POC pending overall C    Malnutrition Status:    Severe malnutrition in the context of chronic illness    Registered Dietitian Interventions:  None additional at this time        SUBJECTIVE INFORMATION  Assessed patient in room briefly (pt minimally interactive per provider notes today).    CURRENT NUTRITION ORDERS  Diet: Regular  - Supplements Ensure Enlive TID with meals; Boost Soothe between meals    Nutrition Support: Was consulted to start TF 3/13 and orders placed, but TF never started due to pt not tolerating NG tube placement on 3/14 per chart review.  Provider discontinued previous TF orders.    CURRENT INTAKE/TOLERANCE  Ongoing poor appetite documented the past week, eating 0-25% of meals documented in flowsheets.     NEW FINDINGS  Weight: wt up since admit, suspect fluid related and/or scale discrepancy  Date/Time Weight Weight Method   03/14/25 1510 72.4 kg (159 lb 9.8 oz) Bed scale   03/06/25 0902 61.2 kg (134 lb 14.7 oz) Bed scale     Skin/wounds: per last WOC note on 3/17, pt with wound on sacrum/coccyx (per WOC note, \"unknown worst stage, currently appears to be a stage 2 but was possibly deeper \"; status: \"stable\"); pt also with edema blisters on bilateral legs (status: healing\" and wound on lower abdomen around SP catheter due to friction and moisture associated skin damage (status: \"initial assessment\") per WOC note    GI symptoms: last BM 3/19 per RN note this morning    Nutrition-relevant labs: Reviewed  - K+, Mg++, and Phos WNL    Nutrition-relevant medications: Reviewed  - Thiamine (100 mg/day x 10 days from 3/13-3/23)    Other: plan for care conference tomorrow    MALNUTRITION  % Intake: </= 50% for >/= 5 days (severe)  % Weight Loss: > 5% in 1 month (severe)   Subcutaneous Fat Loss: Orbital: Severe and Triceps: Severe  Muscle Loss: Temples (temporalis muscle): Severe and " Shoulders (deltoids): Severe  Fluid Accumulation/Edema: Moderately severe, 3+  Malnutrition Diagnosis: Severe malnutrition in the context of chronic illness  Malnutrition Present on Admission: Yes      EVALUATION OF THE PROGRESS TOWARD GOALS   Previous Goals  Patient to consume % of nutritionally adequate meal trays TID, or the equivalent with supplements/snacks.   Evaluation: Not progressing    Previous Nutrition Diagnosis  Inadequate oral intake related to poor appetite and suspect AMS hindering PO as evidenced by eating 0-25% of meals documented in flowsheets and consistently poor appetite documented per progress notes the past week   Evaluation: No change    NUTRITION DIAGNOSIS  Inadequate oral intake related to poor appetite and suspect AMS hindering PO as evidenced by eating 0-25% of meals documented in flowsheets and consistently poor appetite documented per progress notes the past week     INTERVENTIONS  Chart review, no additional nutrition interventions at this time    Goals  Patient to consume % of nutritionally adequate meal trays TID, or the equivalent with supplements/snacks.     Monitoring/Evaluation      Progress toward goals will be monitored and evaluated per policy.     Temitope Vasquez RD, , LD  Weekday Units covered: 5A (non-Heme Onc pts) and 7B ([id number])  Available by 5A or 7B Clinical Dietitijennifer Garcia  Weekend Coverage: Weekend Clinical Dietitijennifer Garcia    Inpatient Clinical Dietitians no longer available via paging

## 2025-03-19 NOTE — PLAN OF CARE
Goal Outcome Evaluation: [id number]      Plan of Care Reviewed With: patient    Overall Patient Progress: no changeOverall Patient Progress: no change    Pain: Abdominal pain- controlled with scheduled tylenol, no PRNs given  Neuro:.WDL except, level of consciousness, mood/behavior, orientation, A&O X2, speech illogical and garbled at times  CV: WDL  Resp: WDL on RA  GI: WDL except, GI symptoms, stool- abdominal discomfort, diarrhea  : WDL except, voiding ability/characteristics, suprapubic catheter in place and patent  Diet: Regular, minimal to no PO intake  Skin: WDL except, integrity- peeling/flaking of hands and fingers  Activity Assistance: assistance, 2 or more people- not OOB this shift  Safety/Falls Risk: Level of Risk: High Risk- bed alarm on  Consults: Nephrology, Nutrition, Ethics, Spiritual health, lymph, palliative, health psych, WOC  Procedures/Imaging: None  Precautions: Bleeding Precautions, Fall Risk Precautions, and Isolation Precautions  Lines: SL port infusing D10 & 1/2 NS @ 100 mL/hr    Continue with current plan of care.

## 2025-03-19 NOTE — CONSULTS
"SPIRITUAL HEALTH SERVICES Consult Note (Palliative)  [facility] ([place]) 5A     Summary: Visit with patient Alis \"Aranza\" Washington at bedside. Aranza reached for my hand and nodded yes to offer of prayer, did not otherwise respond when I explained 's role. She held my hand during prayer and said Amen at the end of prayer. No family present at time of visit.     Plan: Chaplains will follow for spiritual support while Aranza is admitted.     Millie Pena M.Div., [facility]     To reach Spiritual Health, securely message with the Vocera Web Console or enter an ASAP/STAT consult in [employer], which will also page the on-call .    "

## 2025-03-19 NOTE — PROGRESS NOTES
Care Management Follow Up    Length of Stay (days): 15    Expected Discharge Date: 03/21/2025     Concerns to be Addressed:       Patient plan of care discussed at interdisciplinary rounds: Yes    Anticipated Discharge Disposition:  Pending [facility]  Anticipated Discharge Services:    Anticipated Discharge DME:      Patient/family educated on Medicare website which has current facility and service quality ratings:    Education Provided on the Discharge Plan:  Pending   Patient/Family in Agreement with the Plan:  TBD     Referrals Placed by CM/SW:    Private pay costs discussed: Not applicable    Discussed  Partnership in Safe Discharge Planning  document with patient/family: No     Handoff Completed: No, handoff not indicated or clinically appropriate    Additional Information:  RNCC spoke with Pt's daughter to ask if more family would like to attend the care conference tomorrow.     She was hesitant to ask additional family members stating they are not apart of the decision making. RNCC explained additional family is encouraged at care conferences to support her, as it will be a lot of information to hear and process and having extra family hear the information can help her process and recall what was said later. Joy stated she would ask her Brother Freedom but did not feel her cousin Armida or other family members needed to be apart of this.    RNCC provided call in information for her to share with Freedom. RNCC offered to call Freedom, Joy declined and ask RNCC not to contact additional family members.    [facility] Conferance  Thursday, March 20th at 1 pm.  Call In Phone:  [phone]  ID: [id number]  Pin:[phone]  Conference will be in the [address] [place] [address], Team pre-meeting will be at 12:50 in the  work room     Ethics - confirmed with Emily Lao  Palliative-confirmed with Dr. Kamilah De La Torre  Oncology-confirmed with Zahira Sims that someone from team would be present  Medicine- confirmed with Deepali Negrete    Next Steps:  follow for dispo planning    Lizbeth Klein RNCC  Care Management Department  Covering 5A: 7760-4256 & 5C: 9978-8334 (non-BMT)   Phone: [phone]  Teams  Viole[employer]: weekdays 8:00 am - 4:30 pm.   See Vocera Care Team for off-day coverage

## 2025-03-19 NOTE — PROGRESS NOTES
"  PALLIATIVE CARE PROGRESS NOTE  [facility]     Patient Name: Alis Hartman  Date of Admission: 3/4/2025   Today the patient was seen for: goals of care     Recommendations & Counseling     GOALS OF CARE:   Life-prolonging without limits  Per discussions with providers last week, plan for 1 week time limited trial of feeding tube while treating agitation and pain symptoms with goal to see if these treatments lead to improved mentation and participation in cares that would enable a life prolonging treatment pathway. Unfortunately unable to place NG tube 3/14 due to confusion and inability to lay flat, continuing with trial of symptom management alone.  Plan for care conference Thursday 3/20, Palliative plans to participate. We have communicated to Joy that it's very important for her to be physically present for this meeting and to have other family members join (by phone if needed).    ADVANCE CARE PLANNING:  Previously completed HCD naming Aranza (daughter) as HCA, appears this was not dated and deemed \"invalid.\" Raanza confirmed during last admission that Joy is her surrogate decision maker per notes in chart.   There is a POLST form on file, this was reviewed and current.  Code status: Full Code     MEDICAL MANAGEMENT:   #Pain, chronic, low back and abdominal after surgery in July. On chronic prn opioids in varying doses x >10 years. Was down to 5mg oxycodone q 6 hours, but in recent month dose increased up to 20mg q 6 hours prn during last hospitalization.  #Pain, L thigh  #Pain, sacral/coccyx wound and B arm and leg wounds  Pain appears to be improving, requiring fewer doses of PRN opioids today. Plan to continue to decrease full agonist opioids with goal of increased alertness/improved mentation.  Butrans patch 20mcg/hr (increased 3/17)  Acetaminophen 975mg q8hr  Changed oxycodone to 5mg q3h PRN  STOP Hydromorphone to 0.5mg q2 hr PRN (done) - able to " "take PO meds, no current indication for IV pain medication and want to encourage PO oxycodone   Bowel regimen: Senna/miralax - not taking but having frequent loose stool    #Agitation and confusion  #Depressed mood  #History of schizophrenia  Olanzapine increased to 2.5mg/5mg at bedtime 3/18 per Psych  Requested  support     PSYCHOSOCIAL/SPIRITUAL:  Family: Daughter (Joy) and grandchildren, son (Hemanth), niece (Armida)  Joy mentions things being \"God's plan\", though specific [employer] needs/affiliations not addressed     Palliative Care will continue to follow.     Kamilah De La Torre PA-C  [facility], Palliative Care  Securely message with the [employer] Web Console (learn more here) or  Text page via [facility] Paging/Directory      Assessment          Alis Hartman is a 69 year old female with a past medical history of cervical cancer s/p radiation, seriuos adenocarcinoma s/p SNEHA/BSO and cystotomy w/ suprapubic catheter placement 07/2024 and 3 cycles of carbo/taxol (10/2024), hx ESBL urosepsis and bacteremia, RNY gastric bypass, afib on apixaban, HTN, CKD stage 3, and multiple recent admissions (most recently 2/5-2/25 with MADHU, deconditioning, and malnutrition), re-admitted 3/4 with concern for UTI and failure to thrive/general weakness. Palliative following for symptom management and goals of care.      Interval History:     Multidisciplinary collaboration:  Platelets continue to drop  Plan for care conference tomorrow    Notable medications:  Acetaminophen 975 mg q8h (taking as scheduled)  Oxycodone 5mg q3h PRN - total 5mg past 24h  Butrans 20mcg/hr (increased 3/17)  Hydromorphone IV 0.5mg q2h PRN - x4 in past 24 hr  Lidocaine patch  Senna/miralax - not taking but having frequent loose stool  D5 0.45% NaCl infusion  Zyprexa 2.5mg BID    Patient/family narrative  Aranza is laying in bed on right side, eyes open and tracking but minimally interactive. Intermittently nods/shakes head in response to questions.   "   Physical Exam:   Temp:  [95.3  F (35.2  C)-98.7  F (37.1  C)] 98.7  F (37.1  C)  Pulse:  [55-88] 59  Resp:  [16-20] 16  BP: ()/(53-96) 130/80  SpO2:  [94 %-100 %] 99 %  159 lbs 9.81 oz    Gen: Laying in bed in on right side, chronically ill appearing  HEENT: PERRLA  Pulm: nonlabored breathing, comfortable on room air, no cough  MSK:  BLE edema  Neuro: eyes open and tracking, minimally interactive - slightly shaking or nodding head intermittently in response to questions        Data Reviewed:     Labs notable for:  Platelets 26 (downtrending since 3/11)    Medical Decision Making       MANAGEMENT DISCUSSED with the following over the past 24 hours: Medicine   NOTE(S)/MEDICAL RECORDS REVIEWED over the past 24 hours: Medicine  Medical complexity over the past 24 hours:  - Prescription DRUG MANAGEMENT performed

## 2025-03-20 ENCOUNTER — ANESTHESIA EVENT (OUTPATIENT)
Dept: INTENSIVE CARE | Facility: CLINIC | Age: 70
End: 2025-03-20
Payer: COMMERCIAL

## 2025-03-20 ENCOUNTER — APPOINTMENT (OUTPATIENT)
Dept: GENERAL RADIOLOGY | Facility: CLINIC | Age: 70
End: 2025-03-20
Attending: PHYSICIAN ASSISTANT
Payer: MEDICAID

## 2025-03-20 ENCOUNTER — ANESTHESIA (OUTPATIENT)
Dept: INTENSIVE CARE | Facility: CLINIC | Age: 70
End: 2025-03-20
Payer: COMMERCIAL

## 2025-03-20 LAB
ALBUMIN SERPL BCG-MCNC: 1.8 G/DL (ref 3.5–5.2)
ALBUMIN SERPL BCG-MCNC: 1.9 G/DL (ref 3.5–5.2)
ALP SERPL-CCNC: 109 U/L (ref 40–150)
ALP SERPL-CCNC: 113 U/L (ref 40–150)
ALT SERPL W P-5'-P-CCNC: 25 U/L (ref 0–50)
ALT SERPL W P-5'-P-CCNC: 27 U/L (ref 0–50)
ANION GAP SERPL CALCULATED.3IONS-SCNC: 7 MMOL/L (ref 7–15)
ANION GAP SERPL CALCULATED.3IONS-SCNC: 9 MMOL/L (ref 7–15)
APTT PPP: 51 SECONDS (ref 22–38)
AST SERPL W P-5'-P-CCNC: 30 U/L (ref 0–45)
AST SERPL W P-5'-P-CCNC: 36 U/L (ref 0–45)
ATRIAL RATE - MUSE: 80 BPM
BACTERIA UR CULT: ABNORMAL
BASE EXCESS BLDV CALC-SCNC: -4.1 MMOL/L (ref -3–3)
BASOPHILS # BLD AUTO: 0 10E3/UL (ref 0–0.2)
BASOPHILS # BLD AUTO: 0 10E3/UL (ref 0–0.2)
BASOPHILS NFR BLD AUTO: 0 %
BASOPHILS NFR BLD AUTO: 0 %
BILIRUB SERPL-MCNC: 0.3 MG/DL
BILIRUB SERPL-MCNC: 0.4 MG/DL
BUN SERPL-MCNC: 16.6 MG/DL (ref 8–23)
BUN SERPL-MCNC: 17.8 MG/DL (ref 8–23)
CA-I BLD-MCNC: 4.7 MG/DL (ref 4.4–5.2)
CALCIUM SERPL-MCNC: 7.4 MG/DL (ref 8.8–10.4)
CALCIUM SERPL-MCNC: ABNORMAL MG/DL
CHLORIDE SERPL-SCNC: 113 MMOL/L (ref 98–107)
CHLORIDE SERPL-SCNC: 115 MMOL/L (ref 98–107)
CREAT SERPL-MCNC: 0.69 MG/DL (ref 0.51–0.95)
CREAT SERPL-MCNC: 0.88 MG/DL (ref 0.51–0.95)
DIASTOLIC BLOOD PRESSURE - MUSE: NORMAL MMHG
EGFRCR SERPLBLD CKD-EPI 2021: 70 ML/MIN/1.73M2
EGFRCR SERPLBLD CKD-EPI 2021: >90 ML/MIN/1.73M2
EOSINOPHIL # BLD AUTO: 0 10E3/UL (ref 0–0.7)
EOSINOPHIL # BLD AUTO: 0 10E3/UL (ref 0–0.7)
EOSINOPHIL NFR BLD AUTO: 0 %
EOSINOPHIL NFR BLD AUTO: 0 %
ERYTHROCYTE [DISTWIDTH] IN BLOOD BY AUTOMATED COUNT: 25.9 % (ref 10–15)
ERYTHROCYTE [DISTWIDTH] IN BLOOD BY AUTOMATED COUNT: 26 % (ref 10–15)
ERYTHROCYTE [DISTWIDTH] IN BLOOD BY AUTOMATED COUNT: 26 % (ref 10–15)
GLUCOSE BLDC GLUCOMTR-MCNC: 111 MG/DL (ref 70–99)
GLUCOSE BLDC GLUCOMTR-MCNC: 117 MG/DL (ref 70–99)
GLUCOSE BLDC GLUCOMTR-MCNC: 117 MG/DL (ref 70–99)
GLUCOSE BLDC GLUCOMTR-MCNC: 128 MG/DL (ref 70–99)
GLUCOSE BLDC GLUCOMTR-MCNC: 137 MG/DL (ref 70–99)
GLUCOSE SERPL-MCNC: 113 MG/DL (ref 70–99)
GLUCOSE SERPL-MCNC: 121 MG/DL (ref 70–99)
HCO3 BLDV-SCNC: 20 MMOL/L (ref 21–28)
HCO3 SERPL-SCNC: 16 MMOL/L (ref 22–29)
HCO3 SERPL-SCNC: 18 MMOL/L (ref 22–29)
HCT VFR BLD AUTO: 22 % (ref 35–47)
HCT VFR BLD AUTO: 23.5 % (ref 35–47)
HCT VFR BLD AUTO: 24.2 % (ref 35–47)
HGB BLD-MCNC: 7 G/DL (ref 11.7–15.7)
HGB BLD-MCNC: 7.7 G/DL (ref 11.7–15.7)
HGB BLD-MCNC: 7.8 G/DL (ref 11.7–15.7)
IMM GRANULOCYTES # BLD: 0 10E3/UL
IMM GRANULOCYTES # BLD: 0 10E3/UL
IMM GRANULOCYTES NFR BLD: 1 %
IMM GRANULOCYTES NFR BLD: 1 %
INR PPP: 1.61 (ref 0.85–1.15)
INTERPRETATION ECG - MUSE: NORMAL
LACTATE SERPL-SCNC: 2 MMOL/L (ref 0.7–2)
LYMPHOCYTES # BLD AUTO: 0.4 10E3/UL (ref 0.8–5.3)
LYMPHOCYTES # BLD AUTO: 0.4 10E3/UL (ref 0.8–5.3)
LYMPHOCYTES NFR BLD AUTO: 12 %
LYMPHOCYTES NFR BLD AUTO: 13 %
MAGNESIUM SERPL-MCNC: 1.8 MG/DL (ref 1.7–2.3)
MAGNESIUM SERPL-MCNC: NORMAL MG/DL
MCH RBC QN AUTO: 30.4 PG (ref 26.5–33)
MCH RBC QN AUTO: 31 PG (ref 26.5–33)
MCH RBC QN AUTO: 31.3 PG (ref 26.5–33)
MCHC RBC AUTO-ENTMCNC: 31.8 G/DL (ref 31.5–36.5)
MCHC RBC AUTO-ENTMCNC: 32.2 G/DL (ref 31.5–36.5)
MCHC RBC AUTO-ENTMCNC: 32.8 G/DL (ref 31.5–36.5)
MCV RBC AUTO: 96 FL (ref 78–100)
MONOCYTES # BLD AUTO: 0.1 10E3/UL (ref 0–1.3)
MONOCYTES # BLD AUTO: 0.1 10E3/UL (ref 0–1.3)
MONOCYTES NFR BLD AUTO: 2 %
MONOCYTES NFR BLD AUTO: 3 %
NEUTROPHILS # BLD AUTO: 2.5 10E3/UL (ref 1.6–8.3)
NEUTROPHILS # BLD AUTO: 2.9 10E3/UL (ref 1.6–8.3)
NEUTROPHILS NFR BLD AUTO: 84 %
NEUTROPHILS NFR BLD AUTO: 85 %
NRBC # BLD AUTO: 0 10E3/UL
NRBC # BLD AUTO: 0 10E3/UL
NRBC BLD AUTO-RTO: 0 /100
NRBC BLD AUTO-RTO: 0 /100
O2/TOTAL GAS SETTING VFR VENT: 21 %
OXYHGB MFR BLDV: 71 % (ref 70–75)
P AXIS - MUSE: 24 DEGREES
PCO2 BLDV: 29 MM HG (ref 40–50)
PH BLDV: 7.43 [PH] (ref 7.32–7.43)
PHOSPHATE SERPL-MCNC: NORMAL MG/DL
PLATELET # BLD AUTO: 11 10E3/UL (ref 150–450)
PLATELET # BLD AUTO: 14 10E3/UL (ref 150–450)
PLATELET # BLD AUTO: 23 10E3/UL (ref 150–450)
PO2 BLDV: 37 MM HG (ref 25–47)
POTASSIUM SERPL-SCNC: 3 MMOL/L (ref 3.4–5.3)
POTASSIUM SERPL-SCNC: 3.4 MMOL/L (ref 3.4–5.3)
PR INTERVAL - MUSE: 140 MS
PROT SERPL-MCNC: 3.1 G/DL (ref 6.4–8.3)
PROT SERPL-MCNC: 3.1 G/DL (ref 6.4–8.3)
QRS DURATION - MUSE: 60 MS
QT - MUSE: 364 MS
QTC - MUSE: 419 MS
R AXIS - MUSE: 30 DEGREES
RBC # BLD AUTO: 2.3 10E6/UL (ref 3.8–5.2)
RBC # BLD AUTO: 2.46 10E6/UL (ref 3.8–5.2)
RBC # BLD AUTO: 2.52 10E6/UL (ref 3.8–5.2)
RETICS # AUTO: 0.06 10E6/UL (ref 0.03–0.1)
RETICS/RBC NFR AUTO: 2.2 % (ref 0.5–2)
SAO2 % BLDV: 72.2 % (ref 70–75)
SODIUM SERPL-SCNC: 138 MMOL/L (ref 135–145)
SODIUM SERPL-SCNC: 140 MMOL/L (ref 135–145)
SYSTOLIC BLOOD PRESSURE - MUSE: NORMAL MMHG
T AXIS - MUSE: 59 DEGREES
TROPONIN T SERPL HS-MCNC: 150 NG/L
VENTRICULAR RATE- MUSE: 80 BPM
WBC # BLD AUTO: 3 10E3/UL (ref 4–11)
WBC # BLD AUTO: 3.4 10E3/UL (ref 4–11)
WBC # BLD AUTO: 5.2 10E3/UL (ref 4–11)

## 2025-03-20 PROCEDURE — 258N000003 HC RX IP 258 OP 636

## 2025-03-20 PROCEDURE — 250N000011 HC RX IP 250 OP 636: Mod: JZ

## 2025-03-20 PROCEDURE — 93010 ELECTROCARDIOGRAM REPORT: CPT | Performed by: INTERNAL MEDICINE

## 2025-03-20 PROCEDURE — 71045 X-RAY EXAM CHEST 1 VIEW: CPT | Mod: 26 | Performed by: RADIOLOGY

## 2025-03-20 PROCEDURE — 84484 ASSAY OF TROPONIN QUANT: CPT | Performed by: PHYSICIAN ASSISTANT

## 2025-03-20 PROCEDURE — 83605 ASSAY OF LACTIC ACID: CPT | Performed by: PHYSICIAN ASSISTANT

## 2025-03-20 PROCEDURE — 5A1955Z RESPIRATORY VENTILATION, GREATER THAN 96 CONSECUTIVE HOURS: ICD-10-PCS | Performed by: INTERNAL MEDICINE

## 2025-03-20 PROCEDURE — 258N000003 HC RX IP 258 OP 636: Performed by: STUDENT IN AN ORGANIZED HEALTH CARE EDUCATION/TRAINING PROGRAM

## 2025-03-20 PROCEDURE — 85045 AUTOMATED RETICULOCYTE COUNT: CPT | Performed by: STUDENT IN AN ORGANIZED HEALTH CARE EDUCATION/TRAINING PROGRAM

## 2025-03-20 PROCEDURE — 85730 THROMBOPLASTIN TIME PARTIAL: CPT | Performed by: PHYSICIAN ASSISTANT

## 2025-03-20 PROCEDURE — 99418 PROLNG IP/OBS E/M EA 15 MIN: CPT | Performed by: STUDENT IN AN ORGANIZED HEALTH CARE EDUCATION/TRAINING PROGRAM

## 2025-03-20 PROCEDURE — 94660 CPAP INITIATION&MGMT: CPT

## 2025-03-20 PROCEDURE — 85060 BLOOD SMEAR INTERPRETATION: CPT | Performed by: STUDENT IN AN ORGANIZED HEALTH CARE EDUCATION/TRAINING PROGRAM

## 2025-03-20 PROCEDURE — 250N000013 HC RX MED GY IP 250 OP 250 PS 637: Performed by: STUDENT IN AN ORGANIZED HEALTH CARE EDUCATION/TRAINING PROGRAM

## 2025-03-20 PROCEDURE — 99418 PROLNG IP/OBS E/M EA 15 MIN: CPT | Performed by: PHYSICIAN ASSISTANT

## 2025-03-20 PROCEDURE — 71045 X-RAY EXAM CHEST 1 VIEW: CPT

## 2025-03-20 PROCEDURE — 370N000003 HC ANESTHESIA WARD SERVICE: Performed by: NURSE ANESTHETIST, CERTIFIED REGISTERED

## 2025-03-20 PROCEDURE — 82330 ASSAY OF CALCIUM: CPT | Performed by: PHYSICIAN ASSISTANT

## 2025-03-20 PROCEDURE — 99233 SBSQ HOSP IP/OBS HIGH 50: CPT | Performed by: STUDENT IN AN ORGANIZED HEALTH CARE EDUCATION/TRAINING PROGRAM

## 2025-03-20 PROCEDURE — 999N000248 HC STATISTIC IV INSERT WITH US BY RN

## 2025-03-20 PROCEDURE — 99233 SBSQ HOSP IP/OBS HIGH 50: CPT | Performed by: PHYSICIAN ASSISTANT

## 2025-03-20 PROCEDURE — 200N000002 HC R&B ICU UMMC

## 2025-03-20 PROCEDURE — 93005 ELECTROCARDIOGRAM TRACING: CPT

## 2025-03-20 PROCEDURE — 250N000013 HC RX MED GY IP 250 OP 250 PS 637: Performed by: PHYSICIAN ASSISTANT

## 2025-03-20 PROCEDURE — 250N000011 HC RX IP 250 OP 636

## 2025-03-20 PROCEDURE — 120N000003 HC R&B IMCU UMMC

## 2025-03-20 PROCEDURE — 99292 CRITICAL CARE ADDL 30 MIN: CPT | Performed by: PHYSICIAN ASSISTANT

## 2025-03-20 PROCEDURE — 82247 BILIRUBIN TOTAL: CPT | Performed by: PHYSICIAN ASSISTANT

## 2025-03-20 PROCEDURE — 85025 COMPLETE CBC W/AUTO DIFF WBC: CPT

## 2025-03-20 PROCEDURE — 80053 COMPREHEN METABOLIC PANEL: CPT

## 2025-03-20 PROCEDURE — 82805 BLOOD GASES W/O2 SATURATION: CPT | Performed by: PHYSICIAN ASSISTANT

## 2025-03-20 PROCEDURE — 99291 CRITICAL CARE FIRST HOUR: CPT | Performed by: PHYSICIAN ASSISTANT

## 2025-03-20 PROCEDURE — G0463 HOSPITAL OUTPT CLINIC VISIT: HCPCS

## 2025-03-20 PROCEDURE — 85610 PROTHROMBIN TIME: CPT | Performed by: PHYSICIAN ASSISTANT

## 2025-03-20 PROCEDURE — 83735 ASSAY OF MAGNESIUM: CPT | Performed by: PHYSICIAN ASSISTANT

## 2025-03-20 PROCEDURE — 250N000011 HC RX IP 250 OP 636: Performed by: STUDENT IN AN ORGANIZED HEALTH CARE EDUCATION/TRAINING PROGRAM

## 2025-03-20 PROCEDURE — 999N000157 HC STATISTIC RCP TIME EA 10 MIN

## 2025-03-20 PROCEDURE — 94002 VENT MGMT INPAT INIT DAY: CPT

## 2025-03-20 PROCEDURE — 85027 COMPLETE CBC AUTOMATED: CPT | Performed by: PHYSICIAN ASSISTANT

## 2025-03-20 PROCEDURE — 85025 COMPLETE CBC W/AUTO DIFF WBC: CPT | Performed by: STUDENT IN AN ORGANIZED HEALTH CARE EDUCATION/TRAINING PROGRAM

## 2025-03-20 RX ORDER — ROPIVACAINE IN 0.9% SOD CHL/PF 0.1 %
.01-.2 PLASTIC BAG, INJECTION (ML) EPIDURAL CONTINUOUS
Status: DISCONTINUED | OUTPATIENT
Start: 2025-03-20 | End: 2025-03-20 | Stop reason: DRUGHIGH

## 2025-03-20 RX ORDER — NOREPINEPHRINE BITARTRATE 0.06 MG/ML
.01-.6 INJECTION, SOLUTION INTRAVENOUS CONTINUOUS
Status: DISCONTINUED | OUTPATIENT
Start: 2025-03-20 | End: 2025-03-24

## 2025-03-20 RX ORDER — NICOTINE POLACRILEX 4 MG
15-30 LOZENGE BUCCAL
Status: DISCONTINUED | OUTPATIENT
Start: 2025-03-20 | End: 2025-03-28

## 2025-03-20 RX ORDER — OXYCODONE HCL 20 MG/ML
5 CONCENTRATE, ORAL ORAL EVERY 4 HOURS PRN
Status: DISCONTINUED | OUTPATIENT
Start: 2025-03-20 | End: 2025-03-21

## 2025-03-20 RX ORDER — LIDOCAINE 4 G/G
1 PATCH TOPICAL DAILY PRN
Status: DISCONTINUED | OUTPATIENT
Start: 2025-03-20 | End: 2025-04-04 | Stop reason: HOSPADM

## 2025-03-20 RX ORDER — LORAZEPAM 2 MG/ML
4 INJECTION INTRAMUSCULAR ONCE
Status: COMPLETED | OUTPATIENT
Start: 2025-03-21 | End: 2025-03-20

## 2025-03-20 RX ORDER — NOREPINEPHRINE BITARTRATE 0.06 MG/ML
INJECTION, SOLUTION INTRAVENOUS
Status: DISCONTINUED
Start: 2025-03-20 | End: 2025-03-20 | Stop reason: HOSPADM

## 2025-03-20 RX ORDER — LINEZOLID 2 MG/ML
600 INJECTION, SOLUTION INTRAVENOUS EVERY 12 HOURS
Status: DISCONTINUED | OUTPATIENT
Start: 2025-03-20 | End: 2025-03-27

## 2025-03-20 RX ORDER — ACETAMINOPHEN 325 MG/1
650 TABLET ORAL EVERY 6 HOURS PRN
Status: DISCONTINUED | OUTPATIENT
Start: 2025-03-20 | End: 2025-03-21

## 2025-03-20 RX ORDER — IOPAMIDOL 755 MG/ML
117 INJECTION, SOLUTION INTRAVASCULAR ONCE
Status: COMPLETED | OUTPATIENT
Start: 2025-03-21 | End: 2025-03-21

## 2025-03-20 RX ORDER — DIPHENHYDRAMINE HYDROCHLORIDE AND LIDOCAINE HYDROCHLORIDE AND ALUMINUM HYDROXIDE AND MAGNESIUM HYDRO
10 KIT EVERY 6 HOURS PRN
Status: DISCONTINUED | OUTPATIENT
Start: 2025-03-20 | End: 2025-03-21

## 2025-03-20 RX ORDER — PIPERACILLIN SODIUM, TAZOBACTAM SODIUM 4; .5 G/20ML; G/20ML
4.5 INJECTION, POWDER, LYOPHILIZED, FOR SOLUTION INTRAVENOUS EVERY 6 HOURS
Status: DISCONTINUED | OUTPATIENT
Start: 2025-03-20 | End: 2025-03-21 | Stop reason: DRUGHIGH

## 2025-03-20 RX ORDER — LORAZEPAM 2 MG/ML
2 INJECTION INTRAMUSCULAR
Status: DISCONTINUED | OUTPATIENT
Start: 2025-03-20 | End: 2025-03-21

## 2025-03-20 RX ORDER — CHLORHEXIDINE GLUCONATE ORAL RINSE 1.2 MG/ML
15 SOLUTION DENTAL EVERY 12 HOURS
Status: DISCONTINUED | OUTPATIENT
Start: 2025-03-20 | End: 2025-03-27

## 2025-03-20 RX ORDER — PROPOFOL 10 MG/ML
5-75 INJECTION, EMULSION INTRAVENOUS CONTINUOUS
Status: DISCONTINUED | OUTPATIENT
Start: 2025-03-20 | End: 2025-03-23

## 2025-03-20 RX ORDER — ALBUTEROL SULFATE 0.83 MG/ML
2.5 SOLUTION RESPIRATORY (INHALATION) EVERY 4 HOURS PRN
Status: DISCONTINUED | OUTPATIENT
Start: 2025-03-20 | End: 2025-04-04 | Stop reason: HOSPADM

## 2025-03-20 RX ORDER — DEXTROSE MONOHYDRATE 25 G/50ML
25-50 INJECTION, SOLUTION INTRAVENOUS
Status: DISCONTINUED | OUTPATIENT
Start: 2025-03-20 | End: 2025-03-28

## 2025-03-20 RX ADMIN — Medication 1 SPRAY: at 09:15

## 2025-03-20 RX ADMIN — Medication 1 SPRAY: at 16:07

## 2025-03-20 RX ADMIN — Medication 1 SPRAY: at 12:04

## 2025-03-20 RX ADMIN — LINEZOLID 600 MG: 600 INJECTION, SOLUTION INTRAVENOUS at 11:08

## 2025-03-20 RX ADMIN — DEXTROSE AND SODIUM CHLORIDE: 10; .45 INJECTION, SOLUTION INTRAVENOUS at 10:19

## 2025-03-20 RX ADMIN — OXYCODONE HYDROCHLORIDE 5 MG: 100 SOLUTION ORAL at 14:48

## 2025-03-20 RX ADMIN — NOREPINEPHRINE BITARTRATE 0.01 MCG/KG/MIN: 0.06 INJECTION, SOLUTION INTRAVENOUS at 23:40

## 2025-03-20 RX ADMIN — LEVETIRACETAM 3000 MG: 500 INJECTION, SOLUTION INTRAVENOUS at 22:55

## 2025-03-20 RX ADMIN — LORAZEPAM 4 MG: 2 INJECTION INTRAMUSCULAR; INTRAVENOUS at 23:52

## 2025-03-20 RX ADMIN — HYDROMORPHONE HYDROCHLORIDE 0.5 MG: 1 INJECTION, SOLUTION INTRAMUSCULAR; INTRAVENOUS; SUBCUTANEOUS at 02:55

## 2025-03-20 ASSESSMENT — ACTIVITIES OF DAILY LIVING (ADL)
ADLS_ACUITY_SCORE: 96

## 2025-03-20 NOTE — PROGRESS NOTES
PALLIATIVE CARE PROGRESS NOTE  [facility]     Patient Name: Alis Hartman  Date of Admission: 3/4/2025   Today the patient was seen for: care conference, symptom management     Recommendations & Counseling     GOALS OF CARE:   Life-prolonging without limits, plan for discharge home with hospice in coming days  Increased focus on Aranza's comfort when possible (optimizing pain control, limiting labs, eating and drinking as desired)  If Aranza's condition is deteriorating, recommend calling family (daughter Joy) to discuss transitioning to comfort care in the hospital.    Participated in care conference this afternoon with Joy (daughter), cousin, Medicine, Gyn Onc and Palliative SW:  Medical updates provided including that Aranza's pain and agitation have improved since admission but she continues to struggle with hypoglycemia and poor nutrition as well as hypothermia and fluctuating mentation. Shared that providers are concerned Aranza is dying despite our interventions.  Explained that we would like to focus on Aranza's comfort at this point and stop medical interventions that appear to be causing more suffering than benefit. Specifically discussed aggressive symptom management and stopping lab and glucose checks, vitals checks, IV antibiotics, IV fluids, etc.  Family asked if this means Aranza will die in the hospital. Discussed possibility of Aranza returning home with hospice care - this is what family believes Aranza would want. Reviewed prognosis in the range of days to weeks after stopping current supports.  Family requests that we continue with current life-prolonging care until Aranza is able to get home with hospice. All in agreement with this plan.  Family has consistently indicated they want Aranza to remain Full Code, even today with understanding she is approaching end of life. A DNR order is NOT a requirement for hospice enrollment.    ADVANCE CARE  "PLANNING:  Previously completed HCD naming Aranza (daughter) as HCA, appears this was not dated and deemed \"invalid.\" Aranza confirmed during last admission that Joy is her surrogate decision maker per notes in chart.   There is a POLST form on file indicating Full Code/full treatment. Consider revising prior to discharge vs with hospice team.  Code status: Full Code     MEDICAL MANAGEMENT:   #Pain, chronic, low back and abdominal after surgery in July. On chronic prn opioids in varying doses x >10 years. Was down to 5mg oxycodone q 6 hours, but in recent month dose increased up to 20mg q 6 hours prn during last hospitalization.  #Pain, L thigh  #Pain, sacral/coccyx wound and B arm and leg wounds  Difficult to assess pain in setting of decreased alertness 3/20, now unable to take oral tablets.  Butrans patch 20mcg/hr (increased 3/17)  Changed PO oxycodone to oxycodone intensol solution (sublingual) 5mg q4h PRN (given difficulty taking oral meds) - please prioritize giving sublingual oxycodone in preparation for discharge home with hospice  IV hydromorphone q6h PRN resumed 3/19 as unable to take PO, ok to continue for now, family understands she will not be able to receive IV medications on home hospice  Changed tylenol to PRN per family request (done)  Bowel regimen: Senna/miralax - not taking but having frequent loose stool    #Agitation and confusion  #Depressed mood  #History of schizophrenia  Olanzapine increased to 2.5mg/5mg at bedtime 3/18 per Psych  Requested  support     PSYCHOSOCIAL/SPIRITUAL:  Family: Daughter (Joy) and grandchildren, son (Hemanth), niece (Armida)  Joy mentions things being \"God's plan\", though specific Gnosticist needs/affiliations not addressed     Palliative Care will continue to follow.     Kamilah De La Torre PA-C  [facility], Palliative Care  Securely message with the Voc[employer] Web Console (learn more here) or  Text page via [facility] Paging/Directory      Assessment          Alis " AMINAH Hartman is a 69 year old female with a past medical history of cervical cancer s/p radiation, seriuos adenocarcinoma s/p SNEHA/BSO and cystotomy w/ suprapubic catheter placement 07/2024 and 3 cycles of carbo/taxol (10/2024), hx ESBL urosepsis and bacteremia, RNY gastric bypass, afib on apixaban, HTN, CKD stage 3, and multiple recent admissions (most recently 2/5-2/25 with MADHU, deconditioning, and malnutrition), re-admitted 3/4 with concern for UTI and failure to thrive/general weakness. Palliative following for symptom management and goals of care.      Interval History:     Multidisciplinary collaboration:  Worsening thrombocytopenia, hypothermic overnight  Reportedly refusing PO meds including oxycodone, PRN IV dilaudid resumed  Plan for care conference this afternoon    Notable medications:  Acetaminophen 975 mg q8h (no longer taking)  Oxycodone 5mg q3h PRN - total 5mg past 24h  Butrans 20mcg/hr (increased 3/17)  Hydromorphone IV 0.5mg q6h PRN - x3 in past 24 hr  Lidocaine patch (not using)  Magic mouthwash (not using)  Senna/miralax - not taking but having frequent loose stool  D5 0.45% NaCl infusion  Zyprexa 2.5mg BID (refused 3/19 PM and 3/20 AM)    Patient/family narrative  Saw Aranza prior to care conference today. She was laying in bed on right side, eyes open and tracking but not responding to questions. Squeezes hand on command.     Physical Exam:   Temp:  [95.3  F (35.2  C)-98.7  F (37.1  C)] 95.3  F (35.2  C)  Pulse:  [59-96] 65  Resp:  [16-20] 18  BP: (108-164)/() 154/118  SpO2:  [86 %-100 %] 100 %  159 lbs 9.81 oz    Gen: Laying in bed in on right side, chronically ill appearing  Pulm: nonlabored breathing, comfortable on room air, no cough  MSK:  BLE edema  Neuro: eyes open and tracking, minimally interactive        Data Reviewed:     Labs notable for:  Platelets 11 > 14 (downtrending since 3/11)    Medical Decision Making       90 MINUTES SPENT BY ME on the date of service doing chart  review, history, exam, documentation & further activities per the note.

## 2025-03-20 NOTE — CONSULTS
The purpose of this note, including any accompanying recommendation, is advisory only concerning ethical principles and considerations related to this case. Determination of appropriate patient care decisions is the responsibility of the clinical team in consultation with patients and their decision makers and relevant institutional policy. Legal and policy questions should be addressed with Risk Management.     The Clinical Ethics Consult service has been asked to help with a continuing goals of care discussion involving apparent lack of agreement between Aranza's daughter Joy, serving as her mother's surrogate decisionmaker, in light of Aranza's documented insufficient decisionmaking capacity, and the care team. My initial discussion with Dr. Negrete preceded last week's anticipated time trial of NG feeding, which subsequently was unable to proceed because Aranza was not physically or mentally able to have the NG tube passed, according to the documentation by the radiology team.     Dr. Ling Franco's (palliative) March 13 note discusses trying informed nondissent to resolve the medical team's assessment that, with Aranza's deteriorated condition and resistance to care c/w her July 2024 POLST, hospice is now in Aranza's best interest. This contrasts with Joy's preferences that align more with the July 29, 2024, valid and signed POLST orders for full code, tube feedings, and IM antibiotics. Previous notes indicate that Joy struggles with acceptance of her mother's condition. Reportedly, when Aranza is more lucid, she repeatedly says she does not want to eat, does not want to have a CT, does not want to be in the hospital, and gets tearful when discussing her daughter's insistence on restorative treatment. The care team does not want to force unwanted treatment on Aranza that it believes creates more burdens than benefits. There is a Jolanta Peralta medical note from today stating that at one point in  Aranza's cancer course, the family decided against further treatment. This perhaps leads to the informed non-dissent proposal.    The framework of informed nondissent (As outlined by Deandre Livingston in  The Shared Decision Making Continuum) is as follows:     With informed nondissent decision making, the physician, guided by the patient s values, determines the best course of action and fully informs the patient and/or surrogate. The response to the MD's action plan may either be silence, thereby allowing the physician s decision to stand, or a veto of the decision. In this approach the patient's surrogate decision maker must understand all pertinent information (as would be the case in any method of decision making). Furthermore, the patient and/or surrogate must appreciate that silence will be construed as tacit agreement. Decisionmakers must understand that they are welcome to veto the decision and if so, their wishes will be honored and they will receive excellent care.      Informed non-dissent can be useful in removing the awful burden placed on a loving surrogate to make treatment decisions that to them feel like they are, e.g., violating a strongly held Restoration belief or giving up on the person they love and have cared for. In the [facility], we have witnessed gratitude from resistant surrogates when the medical team has explicitly communicated medically appropriate care plans and, lacking a clear veto, have proceeded with that plan.    Thank you for consulting ethics. I plan to participate in the Thursday care conference. Please feel free to call or page the service with any further questions.    Emily Lao DNP, KARL, RN  [facility] Clinical Ethics Consult ([facility]) Service

## 2025-03-20 NOTE — PROGRESS NOTES
"[facility]    Medicine Progress Note - Hospitalist Service, GOLD TEAM 8    Date of Admission:  3/4/2025    Assessment & Plan   \"Aranza\" Alis Hartman is a 69 year old woman admitted on 2/5/2025. 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  Hospital course complicated by MADHU, hypervolemia, acute on chronic pain, suspected dementia, and decreased oral intake with severe malnutrition. After care conference on 3/20, we are planning for discharge with home hospice if possible and will continue with current medical cares while admitted to maximize the patient's remaining time.    Interval updates:  - Care conference this afternoon -- see GOC below for details  - Patient continues to decline, now with hypothermia and pancytopenia  - Smear sent to evaluate pancytopenia  - Medications changed to IV or sublingual formulations as able as patient not able to swallow    Severe Protein-Calorie Malnutrition   Decreased oral intake/Refusal to eat   Hypoglycemia  Hypernatremia, likely secondary to decreased oral intake, resolved  Hypokalemia, likely secondary to decreased oral intake  Patient has been refusing to eat, has not had good nutrition for several days.  Calorie counts 3/11 to 3/15 showed 0 intake.  Likely secondary to difficult to manage psychiatric history, component of dementia.  Hypernatremia resolved with D5W. Attempted XR feeding tube placement 3/14. unable to be placed due to patient disorientation and difficulty laying flat (details in radiology note).   - Can continue to encourage PO intake  - Glucose " checks q4h  - Continue D10, would continue until discharge per care conference on 3/20  - Nutrition consulted, appreciate assistance    Mixed etiology cognitive impairment: contributions from delirium, metabolic encephalopathy, dementia, schizophrenia  On admission patient's family was concerned about her confusion. Patient has paranoia with suspected hallucinations. Acute worsening was likely related to possible UTI v/s dehydration lack of PO. Psychiatry consulted, saw patient 3/11, patient denied depression, hallucinations and psych could not elicit any paranoia symptoms.   - Continue zyprexa 2.5 mg twice daily (started 3/13)  - Delirium precautions    Acute on Chronic Pain  Thigh, low back pain related to her coccyx.  - Palliative care following, appreciate assistance  - Oxycodone changed to sublingual  - IV dilaudid for breakthrough while admitted  - Lidocaine patch  - Buprenorphine patch started 3/13, dose increased 3/17   - Tylenol made prn per family request    Acute on chronic anemia likely secondary to chronic disease  Thrombocytopenia, HIT ruled out  Leukopenia  Did require pRBC transfusion on 3/13. No signs of bleeding. Platelets have now decreased significantly from ~150 to <50. There was concern for HIT, but screening panel returned negative. Now also with leukopenia. Likely this is related to overall clinical decline and malnutrition, but will investigate other causes.  - Holding lovenox/heparin  - Smear sent  - Transfuse as needed for Hgb <7, Plt <10k    Goals of care   Recurrent hospitalizations with progressive step-wise decline  Care conference today, 3/20 with [facility] care, medicine, gyn onc. Patient's daughter and one of her cousins attended in person. The teams discussed that we are worried the patient is in the process of dying despite all of the treatment that she has been getting this admission. We recommended that we focus on keeping her comfortable for the time that she has remaining. Her  daughter asked if this meant dying in the hospital and stated that she would want her mother at home. This led to a discussion about home hospice which the patient's daughter was agreeable to. She asked if we could continue the current therapies while the patient remains in the hospital which is reasonable to maximize time. We did broach the topic of code status and they were very clear that they want the patient to remain full code at this time.    MADHU, oliguric, likely secondary to poor oral intake/malnutrition (now resolved)  Anasarca  Dependent facial edema  Creatinine at baseline 0.8-0.9. Resolved with 100 g of 25% albumin on 3/12 and 3/13. Renal US normal.  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 CRP elevated, likely due to inflammation in the setting chronic wounds. Trended down without antibiotics.  No leukocytosis, afebrile, hemodynamically stable.  Low concern for cellulitis.  - WOC consulted  - Monitor for now, if febrile will start antibiotics  - Urology recommends gauze or drain sponge around the site    Afib   Rate controlled. Prior admission had risk/benefit discussion and discontinued AC due to vaginal bleeding.    Serous endometrial carcinoma  Follows w/ heme/onc through [place]. S/p SNEHA, BSO 07/2024 s/p cycle 3 carbo/taxel 10/15/2024, cycle 4 delayed in s/o recent admission, family ultimately decided to forgo any further treatment. During a care conference this admission, it was expressed that her cancer is likely to recur due to it being an aggressive type but when it will recur is unknown. However, should it recur GynOnc team expressed that because of her functional status (significant weakness) and  malnutrition, treatment would not be recommended since that would worsen her health overall and benefits would not outweigh the risks.    Pyuria, VRE in urine culture   Hx of ESBL urosepsis and bacteremia  Bladder dysfunction s/p cystostomy and suprapubic catheter  Recently admitted 11/26 - 12/8/2024 for ESBL urosepsis and bacteremia requiring ICU w/ left nephrostomy tube (removed 1/7) treated w/ ertapenem, returned 1/25-1/27 for concern for UTI but no clear infection at that time. Urology collected UA during SPC exchange on 3/17 due to turbid urine and this is now growing VRE. Difficult to assess symptoms due to patient's mentation, so will err on the side of treating.  - Suprapubic catheter exchanged by urology on 3/17  - Continue linezolid for VRE - changed to IV  - Follow up final culture/susceptibilities            Diet: Combination Diet Regular Diet Adult  Snacks/Supplements Adult: Ensure Enlive; With Meals  Snacks/Supplements Adult: Boost Soothe; Between Meals    DVT Prophylaxis: Pneumatic Compression Devices  Shahid Catheter: Not present  Lines: PRESENT      Port a Cath 02/05/25 Single Lumen Right Chest wall-Site Assessment: WDL      Cardiac Monitoring: None  Code Status: Full Code      Clinically Significant Risk Factors          # Hyperchloremia: Highest Cl = 111 mmol/L in last 2 days, will monitor as appropriate          # Hypoalbuminemia: Lowest albumin = 2.2 g/dL at 3/19/2025  2:38 AM, will monitor as appropriate   # Thrombocytopenia: Lowest platelets = 26 in last 2 days, will monitor for bleeding   # Hypertension: Noted on problem list             # Severe Malnutrition: based on nutrition assessment and treatment provided per dietitian's recommendations.    # Financial/Environmental Concerns: none         Social Drivers of Health    Food Insecurity: Unknown (3/12/2025)    Food Insecurity     Within the past 12 months, did you worry that your food would run out before you got money to buy more?: Patient  unable to answer     Within the past 12 months, did the food you bought just not last and you didn t have money to get more?: Patient unable to answer   Housing Stability: Unknown (3/12/2025)    Housing Stability     Do you have housing? : Patient unable to answer     Are you worried about losing your housing?: Patient unable to answer   Financial Resource Strain: Unknown (3/12/2025)    Financial Resource Strain     Within the past 12 months, have you or your family members you live with been unable to get utilities (heat, electricity) when it was really needed?: Patient unable to answer   Transportation Needs: Unknown (3/12/2025)    Transportation Needs     Within the past 12 months, has lack of transportation kept you from medical appointments, getting your medicines, non-medical meetings or appointments, work, or from getting things that you need?: Patient unable to answer   Interpersonal Safety: Unknown (3/12/2025)    Interpersonal Safety     Do you feel physically and emotionally safe where you currently live?: Patient unable to answer     Within the past 12 months, have you been hit, slapped, kicked or otherwise physically hurt by someone?: Patient unable to answer     Within the past 12 months, have you been humiliated or emotionally abused in other ways by your partner or ex-partner?: Patient unable to answer          Disposition Plan     Medically Ready for Discharge: Anticipated in 5+ Days             Jolanta Limon MD  Hospitalist Service, [address]  Securely message with [employer] (more info)  Text page via [facility] Paging/Directory   See signed in provider for up to date coverage information  ______________________________________________________________________    Interval History   Patient hypothermic throughout the night. This morning she is minimally responsive on exam. Care conference with daughter in person, details above.    Physical Exam    Vital Signs: Temp: 98  F (36.7  C) Temp src: Axillary BP: 108/82 Pulse: 60   Resp: 16 SpO2: 100 % O2 Device: None (Room air)    Weight: 159 lbs 9.81 oz    General Appearance: Lying in bed, NAD, minimally responsive  Respiratory: Breathing non-labored on RA, CTAB  Cardiovascular: RRR  GI: Soft, appears non-tender  Other: Did not answer any questions for me today     Medical Decision Making       120 MINUTES SPENT BY ME on the date of service doing chart review, history, exam, documentation & further activities per the note.        Data     I have personally reviewed the following data over the past 24 hrs:    3.4 (L)  \   7.8 (L)   / 14 (LL)     140 115 (H) 17.8 /  137 (H)   3.0 (L) 18 (L) 0.69 \     ALT: 27 AST: 36 AP: 109 TBILI: 0.3   ALB: 1.9 (L) TOT PROTEIN: 3.1 (L) LIPASE: N/A     Ferritin:  N/A % Retic:  2.2 (H) LDH:  N/A       Imaging results reviewed over the past 24 hrs:   No results found for this or any previous visit (from the past 24 hours).

## 2025-03-20 NOTE — CONSULTS
"[facility] Nurse Inpatient Assessment     Consulted for: coccyx  3/7: New consult for wounds to bilateral legs  3/17: New consult for wound around SP cath  3/20: New consult for PI on buttocks    Summary: known by [facility] from previous admission     [facility] nurse follow-up plan: weekly    Patient History (according to provider note(s):      The patient is a 69 year old female, with an extensive PMHx which is nicely documented in Dr. Khan and Mis's note from the ER.  Per their notes:  Ms. 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  The patient reportedly was feeling sick and her \"caretakers\" wanted her seen by a physician.  Apparently she has not been feeling well since her last discharge from the hospital with increased confusion, poor appetite and increased generalized weakness which now she is clearly confined to her bed because of this. Her urine output(UOP) has been foul smelling and lower amounts.  The patient refused to come to the ER 2 days ago.  The patient denies any other complaints but again has a difficult time offering a cogent history as to how she is feeling and what she's complaining of.  The remainder of the history of present illness is limited given the patient's altered mental status    Assessment:      Areas visualized during today's visit: Focused: and Abdomen    Wound location: Lower abdomen around SP catheter      Last photo: 3/20  Wound due to: Friction and Moisture Associated Skin Damage (MASD)  Wound history/plan of care: Skin break down noted by bedside RN. Pt has chronic indwelling SP cath. Tube was exchanged at bedside by Urology 3/17  Wound base: 100 % Erythema " and superficial erosion     Palpation of the wound bed: normal      Drainage: none     Description of drainage: none     Measurements (length x width x depth, in cm): See photos     Tunneling: N/A     Undermining: N/A  Periwound skin: Superficial erosion      Color: pink      Temperature: normal   Odor: none  Pain: moderate, tender  Pain interventions prior to dressing change: slow and gentle cares   Treatment goal: Heal  and Protection  STATUS: deteriorating  Supplies ordered: at bedside     Wound location: Bilateral legs     Left medial lower leg        Left posterior thigh      Right Calf        Right hip        Last photo: 3/20  Wound due to:  Edema blisters  Wound history/plan of care: Pt had large edema blisters on a previous admission that unroofed. Large wound on left medial leg 90% epithelialized.  3/20: Pt has a large amount of weeping edema from all open wounds.  Wound base: 50 % Dermis, 50 % new pink Epidermis -     Palpation of the wound bed: normal      Drainage: small     Description of drainage: serous     Measurements (length x width x depth, in cm): See photos     Tunneling: N/A     Undermining: N/A  Periwound skin: Intact, Ecchymosis, and Edematous - there is purple mottling scattered around bilateral legs      Color: normal and consistent with surrounding tissue      Temperature: normal   Odor: none  Pain: moderate and during dressing change, aching  Pain interventions prior to dressing change: slow and gentle cares   Treatment goal: Heal  and Protection  STATUS: healing  Supplies ordered: at bedside     Pressure Injury Location: Sacrum/coccyx                            2/18          Last photo: 3/20  Wound type: Pressure Injury, Shear, and Moisture Associated Skin Damage (MASD)     Pressure Injury Stage: site of previously healed pressure injury, present on admission     Wound history/plan of care:   unknown worst stage, currently appears to be a stage 2 but was possibly deeper. Was noted as a  reinjury on her last assessment here 12/4/24 and 1/27/25  Wound base: 80 % Fibrin, 20% dermis.      Palpation of the wound bed: normal      Drainage: small     Description of drainage: none     Measurements (length x width x depth, in cm) total area 7 x 4 x 0.1 cm   Periwound skin: Intact and Erythema- blanchable      Color: normal and consistent with surrounding tissue      Temperature: normal   Odor: none  Pain: moderate, tender  Pain intervention prior to dressing change: slow and gentle cares   Treatment goal: Heal  and Protection  STATUS: stable  Supplies ordered: discussed with RN and discussed with patient     Wound location: Yaa-anal area, inner thighs        Last photo: 3/20  Wound due to: Friction, Incontinence Associated Dermatitis (IAD), and Moisture Associated Skin Damage (MASD)  Wound history/plan of care: Pt has been non ambulatory. Per RN Pt had frequent loose stools overnight and has been incontinent of bowel this admission. Pt has a SP cath for urine. Wounds appear consistent with severe MASD. Pt has present on admission pressure injuries to sacrum/coccyx area. Pt also has weeping edema that is leaking out of exposed areas of dermis on inner thighs, exacerbating the moisture issue.   Wound base: 80 % Dermis, 20 % Fibrin     Palpation of the wound bed: normal      Drainage: copious     Description of drainage: serous     Measurements (length x width x depth, in cm): See photos     Tunneling: N/A     Undermining: N/A  Periwound skin: Edematous, Macerated, and Superficial erosion      Color: dusky, pink, and red      Temperature: normal   Odor: none  Pain: no grimacing or signs of discomfort, none  Pain interventions prior to dressing change: slow and gentle cares   Treatment goal: Decrease moisture and Protection  STATUS: initial assessment  Supplies ordered: at bedside and discussed with RN       Treatment Plan:     Inner thigh wounds: PRN, gently cleanse with saline and pat dry. Place one 9x11  "Mextra super absorbent pad (#711617) against each right and left inner thigh. Change pad PRN for drainage.    Lower abdomen around SP cath: BID and as needed.   Cleanse the area with Marry cleanse and protect, very gently with soft cloth.  Apply ostomy powder (#026784) on all open and denuded skin.  Apply thin layer of Barrier paste (ex: Critic aid) on top of it.  With repeat application, do not scrub the paste, only remove soiled paste and reapply.  If complete removal of paste is necessary use baby oil/mineral oil (#823921) and soft wash cloth.    Sacrum/Buttocks/Yaa-anal area: BID and as needed.   Cleanse the area with Marry cleanse and protect, very gently with soft cloth.  Apply ostomy powder (#810749) on all open and denuded skin.  Apply thin layer of Barrier paste (ex: Critic aid) on top of it.  With repeat application, do not scrub the paste, only remove soiled paste and reapply.  If complete removal of paste is necessary use baby oil/mineral oil (#287518) and soft wash cloth.  Ensure pt has chair cushion (#761311) while sitting up in the chair.  Use only one blue pad in between mattress and pt. No brief in bed.      Bilateral lower legs wounds: Every other day: remove dressings and wash wounds with Vashe and gauze. Pat dry. Cover open areas with Vaseline gauze and secure with large Mepilex or ABD and stretch netting. If using Mepilex, note patient has very delicate skin so care should be taken to prevent worsening skin tears when removing.     RLE shin wound: PRN Place a 5x5 Mextra super absorbent pad (919934) on small open area on right shin. Change PRN for drainage.    Pressure Injury Prevention (PIP) Plan:  If patient is declining pressure injury prevention interventions: Explore reason why and address patient's concerns, Educate on pressure injury risk and prevention intervention(s), If patient is still declining, document \"informed refusal\" , and Ensure Care team is aware ( provider, charge nurse, " etc)  Mattress: Follow bed algorithm, add Low Air Loss (Air+) mattress pump if skin is very moist or constantly moist.   HOB: Maintain at or below 30 degrees, unless contraindicated  Repositioning in bed: Every 1-2 hours , Left/right positioning; avoid supine, Raise foot of bed prior to raising head of bed, to reduce patient sliding down (shear), and Frequent microturns using positioning wedges, as patient tolerates  Heels: Pillows under calves  Protective Dressing: Sacral Mepilex for prevention (#553559),  especially for the agitated patient   Positioning Equipment:None  Chair positioning: Chair cushion (#227903) , Assist patient to reposition hourly, and Do NOT use a donut for sitting (this increases pressure to smaller area and creates a higher potential for injury)    If patient has a buttock pressure injury, or high risk for PI use chair cushion or SPS.  Moisture Management: Perineal cleansing /protection: Follow Incontinence Protocol, Avoid brief in bed, Clean and dry skin folds with bathing , Consider InterDry (#805240) between folds, and Moisturize dry skin  Under Devices: Inspect skin under all medical devices during skin inspection , Ensure tubes are stabilized without tension, and Ensure patient is not lying on medical devices or equipment when repositioned  Ask provider to discontinue device when no longer needed.     Orders: Reviewed and Written    RECOMMEND PRIMARY TEAM ORDER: None, at this time  Education provided: importance of repositioning, plan of care, and wound progress  Discussed plan of care with: Patient and Nurse  Notify WOC if wound(s) deteriorate.  Nursing to notify the Provider(s) and re-consult the WOC Nurse if new skin concern.    DATA:     Current support surface: Standard  Standard gel mattress (Isoflex)  Containment of urine/stool: Incontinence Protocol and Indwelling catheter  BMI: Body mass index is 28.27 kg/m .   Active diet order: Orders Placed This Encounter      Combination  Diet Regular Diet Adult     Output: I/O last 3 completed shifts:  In: 2520 [P.O.:120; I.V.:2400]  Out: 410 [Urine:410]     Labs:   Recent Labs   Lab 03/20/25  0638 03/19/25  0238   ALBUMIN  --  2.2*   HGB 7.0* 7.9*   WBC 3.0* 4.4     Pressure injury risk assessment:   Sensory Perception: 3-->slightly limited  Moisture: 2-->very moist  Activity: 1-->bedfast  Mobility: 2-->very limited  Nutrition: 1-->very poor  Friction and Shear: 1-->problem  Yogi Score: 10    Gaurang Barba RN, CWOCN  Pager no longer in use, please contact through [employer]   Radha group: [facility] Nurse [place]    Dept. Office Number: [phone]

## 2025-03-20 NOTE — PROGRESS NOTES
"Care Management Follow Up    Length of Stay (days): 16    Expected Discharge Date: 03/21/2025     Concerns to be Addressed:       Patient plan of care discussed at interdisciplinary rounds: Yes    Anticipated Discharge Disposition:  Home vs Hospice  Anticipated Discharge Services:  TBD  Anticipated Discharge DME:  TBD     Patient/family educated on Medicare website which has current facility and service quality ratings:  no  Education Provided on the Discharge Plan:  no  Patient/Family in Agreement with the Plan:  no    Referrals Placed by CM/SW:    Private pay costs discussed: Not applicable    Discussed  Partnership in Safe Discharge Planning  document with patient/family: No     Handoff Completed: No, handoff not indicated or clinically appropriate    Additional Information:  RNCC notified family would like to discuss a hospice discharge.     RNCC went to room however family had already left. RNCC left a hospice list from the medicare.gov website for family to review.     RNCC called Joy who stated she did not want to talk about hospice and stated \"I'm taking my mom home and will get her feeling better.\" RNCC asked Joy if she wanted hear how hospice supports a home discharge and she said no. Joy stated she does not want to discuss discharge planning in person and would prefer to keep communication over the  phone. \"When I come to the hospital I only want to see my mom.\"      RNCC stated she would call Joy in the morning to talk about a home discharge.    RNCC updated palliative and primary MD. Dr. Peralta will call Joy tomorrow.     At this time, a home discharge without hospice is not possible as Pt would not be medically safe to discharge.      Next Steps: follow for safe dispo planning    LUÍS Licea  Care Management Department  Covering 5A: [id number] & 5C: [id number] (non-BMT)   Phone: [phone]  Teams  Vocera: weekdays 8:00 am - 4:30 pm.   See Vocera Care Team for off-day " coverage

## 2025-03-20 NOTE — PLAN OF CARE
Goal Outcome Evaluation:      Plan of Care Reviewed With: patient    Overall Patient Progress: no changeOverall Patient Progress: no change       Summary:   Pain poorly controlled, pt restless, shaking; daughter came to bedside and was concerned about pts pain level + her refusal to take PO meds; paged doctor, gave PRN IV dilaudid x1 w/some improvement.     No meaningful PO intake this shift. Pt refused q2 turns.     Continuous dextrose IV. BG checks not completed due to difficulty with pt (pt's fingers tightly curled, moved away when trying to measure, etc.).     Significant 24 hour events:  Refusing to take most things PO; worsening pain, daughter worried about pain control    Level of Care: Acute    Summary Type: 5A Report   Pain:  Not controlled gave IV dilaudid, pt refused PO meds  Neuro:.WDL except, level of consciousness, mood/behavior, orientation, A&O X2  CV:WDL  Resp:WDL  GI:.WDL except, GI symptoms, stool, abdominal discomfort, diarrhea  :.WDL except, voiding ability/characteristics, suprapubic catheter in place and patent  Skin: .WDL except, integrity, peeling/flaking of hands and fingers  Activity Assistance: assistance, 3 or more people  Safety/Falls Risk: Level of Risk: High Risk, not OOB, bed alarm on  Consults: Nephrology, Nutrition, Ethics, Spiritual health, lymph, palliative, health psych, WOC  Procedures/Imaging: None  Precautions: Bleeding Precautions, Fall Risk Precautions, and Isolation Precautions

## 2025-03-20 NOTE — PROGRESS NOTES
Brief Medicine Progress Note    Notified by nursing that patient refusing to open mouth for oral medicines including pain medications. Daughter at bedside concerned about IV Dilaudid being discontinued in the day. Care conference is planned for tomorrow including palliative team.       Assessment/Plan  - Reorder Dilaudid 0.5mg IV q6 prn, though encourage oral options prior to offering IV.  -- Will need further discussion at care conference tomorrow re: pain medications going forward.      Lara Be PA-C, [facility] [place]  Hospitalist Service   [facility]  Securely message with Vocera   See AMCOM signed in provider for up to date coverage information

## 2025-03-20 NOTE — PROGRESS NOTES
"PALLIATIVE CARE SOCIAL WORK Progress Note   Location: [facility]      Care Conference    Dtr Joy and her cousin met this afternoon with Aranza's medical teams including medicine, palliative and  gyn/onc.    Medical update provided by Dr. Peralta with medicine team. Update elicited a list of concerns and frustrations from Joy related to her mother's care, some not from this hospitalization. Conversation re-directed by Kamilah De La Torre PA-C with palliative care team to readdress goals for care. Teams shared their concern that Aranza is dying, noted that time could be limited days to short weeks.     Joy verbalized her understanding and expressed concern related to how teams would continue to keep her mom comfortable; Joy also expressed concern that Aranza would now \"just starve to death.\" Education provided around comfort care interventions that are continued and invasive, bothersome or life-prolonging interventions that are discontinued.     Joy expressed that she does not want her mother to pass away in the hospital. Hospice at home discussed as an option by teams, Joy expressed it would be her wish to bring her mom home. Family asked that current interventions continue until discharge with hospice, including full code status.    Joy stated, \"I am done with these meetings\" and indicated she will await a phone call from unit SW or RNCC to discuss hospice intake and discharge planning.    Plan: PCSW will remain available while palliative care team continues to follow, offer support to dtr Joy if need identified.    Clinical Social Work Interventions:   Attended/participated in care conference  Facilitation of processing of thoughts/feelings  Family communication facilitated  Goals of care discussion/facilitation  Re-framing    WILLIAM Vargas, LICSW  [facility], Palliative Care  Securely message with the Vocera Web Console (learn more here) or  Text page via [employer] Paging/Directory "

## 2025-03-20 NOTE — PLAN OF CARE
Goal Outcome Evaluation: 0700-1930      Plan of Care Reviewed With: patient    Overall Patient Progress: decliningOverall Patient Progress: declining    Outcome Evaluation: Patient unable to verbalize    Pain: Controlled, patient appears comfortable. PRN oxy given X1  Neuro:.WDL except, level of consciousness, mood/behavior, orientation, unable to assess orientation due to patient inability to speak  CV: WDL, hypertensive, MDs aware  Resp: WDL on RA  GI: WDL except, stool- diarrhea  : WDL except, voiding ability/characteristics, suprapubic catheter in place and patent  Diet: Regular, no PO intake, patient not alert enough to safely take intake PO. Q4H BG checks  Skin: WDL except, integrity- peeling/flaking of hands and fingers  Activity Assistance: assistance, 2 or more people- not OOB  Safety/Falls Risk: Level of Risk: High Risk- bed alarm on  Consults: Nephrology, Nutrition, Ethics, Spiritual health, lymph, palliative, health psych, WOC  Procedures/Imaging: None  Precautions: Bleeding Precautions, Fall Risk Precautions, and Isolation Precautions  Lines: SL port infusing D10 & 1/2 NS @ 100 mL/hr     Continue with current plan of care.

## 2025-03-20 NOTE — PLAN OF CARE
Goal Outcome Evaluation:      Plan of Care Reviewed With: patient    Overall Patient Progress: no changeOverall Patient Progress: no change     Time: 9844-5461     Blood pressure 108/82, pulse 60, temperature 98  F (36.7  C), temperature source Axillary, resp. rate 16, weight 72.4 kg (159 lb 9.8 oz), SpO2 100%, not currently breastfeeding.  Neuros: Unable to asesss neuros fully. Pt moaning w/ difficult to decipher speech.   Cardiac: Bradycardic in 50s, Intermittently hypertensive w/ dialstolics >100   Respiratory: Non labored breathing. Satting >98% on RA.   GI/: Oliguric UOP via supraubic. Watery diarrhea persistent overnight. Linens changed x3   Diet/Nausea: Not taking any meds PO. No s/s of nausea   Skin: Cccyx/sacrum/buttocks w/ macerated skin, bleeding. Cleaned w/ wound cleanser, perineal lotion and dry cloth wipes w/ square mepilex covering wounds. MD paged and ordered updated WOC consult   LDA: R chest port infusing MIVF @ 100 mL/hr   Labs: Reviewed  Pain: IV dilaudid given x1 for generalized pain   Activity: Repositioned in bed w/ assist x2 q2-3hrs   Social: Daughter called during shift; updates given per Charge SHOAIB Montiel     New changes this shift: Pt hypothermic; unable to grab reliable temps. Skin cool to touch. Bear byroner placed ~0645     Plan: Plan for Care Conference today

## 2025-03-21 ENCOUNTER — APPOINTMENT (OUTPATIENT)
Dept: NEUROLOGY | Facility: CLINIC | Age: 70
End: 2025-03-21
Payer: MEDICAID

## 2025-03-21 ENCOUNTER — APPOINTMENT (OUTPATIENT)
Dept: CT IMAGING | Facility: CLINIC | Age: 70
End: 2025-03-21
Payer: MEDICAID

## 2025-03-21 ENCOUNTER — APPOINTMENT (OUTPATIENT)
Dept: GENERAL RADIOLOGY | Facility: CLINIC | Age: 70
End: 2025-03-21
Payer: MEDICAID

## 2025-03-21 ENCOUNTER — APPOINTMENT (OUTPATIENT)
Dept: CT IMAGING | Facility: CLINIC | Age: 70
End: 2025-03-21
Payer: COMMERCIAL

## 2025-03-21 ENCOUNTER — APPOINTMENT (OUTPATIENT)
Dept: CARDIOLOGY | Facility: CLINIC | Age: 70
End: 2025-03-21
Payer: COMMERCIAL

## 2025-03-21 ENCOUNTER — APPOINTMENT (OUTPATIENT)
Dept: CT IMAGING | Facility: CLINIC | Age: 70
End: 2025-03-21
Attending: PHYSICIAN ASSISTANT
Payer: COMMERCIAL

## 2025-03-21 VITALS
TEMPERATURE: 96.3 F | OXYGEN SATURATION: 100 % | BODY MASS INDEX: 28.27 KG/M2 | RESPIRATION RATE: 20 BRPM | HEART RATE: 90 BPM | SYSTOLIC BLOOD PRESSURE: 127 MMHG | DIASTOLIC BLOOD PRESSURE: 84 MMHG | WEIGHT: 159.61 LBS

## 2025-03-21 LAB
ABO + RH BLD: NORMAL
ACANTHOCYTES BLD QL SMEAR: SLIGHT
ALBUMIN SERPL BCG-MCNC: 1.5 G/DL (ref 3.5–5.2)
ALBUMIN SERPL BCG-MCNC: 1.8 G/DL (ref 3.5–5.2)
ALBUMIN SERPL BCG-MCNC: 1.8 G/DL (ref 3.5–5.2)
ALBUMIN SERPL BCG-MCNC: 2.2 G/DL (ref 3.5–5.2)
ALLEN'S TEST: ABNORMAL
ALP SERPL-CCNC: 101 U/L (ref 40–150)
ALP SERPL-CCNC: 102 U/L (ref 40–150)
ALP SERPL-CCNC: 113 U/L (ref 40–150)
ALP SERPL-CCNC: 126 U/L (ref 40–150)
ALT SERPL W P-5'-P-CCNC: 23 U/L (ref 0–50)
ALT SERPL W P-5'-P-CCNC: 25 U/L (ref 0–50)
ALT SERPL W P-5'-P-CCNC: 28 U/L (ref 0–50)
ALT SERPL W P-5'-P-CCNC: 32 U/L (ref 0–50)
ANION GAP SERPL CALCULATED.3IONS-SCNC: 10 MMOL/L (ref 7–15)
ANION GAP SERPL CALCULATED.3IONS-SCNC: 11 MMOL/L (ref 7–15)
ANION GAP SERPL CALCULATED.3IONS-SCNC: 6 MMOL/L (ref 7–15)
ANION GAP SERPL CALCULATED.3IONS-SCNC: 9 MMOL/L (ref 7–15)
APTT PPP: 128 SECONDS (ref 22–38)
APTT PPP: 67 SECONDS (ref 22–38)
AST SERPL W P-5'-P-CCNC: 30 U/L (ref 0–45)
AST SERPL W P-5'-P-CCNC: 38 U/L (ref 0–45)
AST SERPL W P-5'-P-CCNC: 57 U/L (ref 0–45)
AST SERPL W P-5'-P-CCNC: 58 U/L (ref 0–45)
ATRIAL RATE - MUSE: 108 BPM
ATRIAL RATE - MUSE: 68 BPM
BASE EXCESS BLDA CALC-SCNC: -5.3 MMOL/L (ref -3–3)
BASE EXCESS BLDA CALC-SCNC: -5.9 MMOL/L (ref -3–3)
BASE EXCESS BLDA CALC-SCNC: -7.1 MMOL/L (ref -3–3)
BASE EXCESS BLDV CALC-SCNC: 6.5 MMOL/L (ref -3–3)
BASOPHILS # BLD AUTO: 0 10E3/UL (ref 0–0.2)
BASOPHILS NFR BLD AUTO: 0 %
BILIRUB SERPL-MCNC: 0.4 MG/DL
BILIRUB SERPL-MCNC: 0.4 MG/DL
BILIRUB SERPL-MCNC: 0.8 MG/DL
BILIRUB SERPL-MCNC: 0.8 MG/DL
BLD GP AB SCN SERPL QL: NEGATIVE
BUN SERPL-MCNC: 10.6 MG/DL (ref 8–23)
BUN SERPL-MCNC: 15.1 MG/DL (ref 8–23)
BUN SERPL-MCNC: 16.6 MG/DL (ref 8–23)
BUN SERPL-MCNC: 16.9 MG/DL (ref 8–23)
BURR CELLS BLD QL SMEAR: SLIGHT
CA-I BLD-MCNC: 4.1 MG/DL (ref 4.4–5.2)
CALCIUM SERPL-MCNC: 6.1 MG/DL (ref 8.8–10.4)
CALCIUM SERPL-MCNC: 6.9 MG/DL (ref 8.8–10.4)
CALCIUM SERPL-MCNC: 7.3 MG/DL (ref 8.8–10.4)
CALCIUM SERPL-MCNC: 7.4 MG/DL (ref 8.8–10.4)
CF REDUC 30M P MA P HEP LENFR BLD TEG: 0 % (ref 0–8)
CF REDUC 60M P MA P HEPASE LENFR BLD TEG: 0 % (ref 0–15)
CFT P HPASE BLD TEG: 5.6 MINUTE (ref 1–3)
CHLORIDE SERPL-SCNC: 111 MMOL/L (ref 98–107)
CHLORIDE SERPL-SCNC: 112 MMOL/L (ref 98–107)
CHLORIDE SERPL-SCNC: 113 MMOL/L (ref 98–107)
CHLORIDE SERPL-SCNC: 117 MMOL/L (ref 98–107)
CI (COAGULATION INDEX)(Z): -7.3 (ref -3–3)
CLOT ANGLE P HPASE BLD TEG: 40.5 DEGREES (ref 53–72)
CLOT INIT P HPASE BLD TEG: 7.8 MINUTE (ref 5–10)
CLOT STRENGTH P HPASE BLD TEG: 3 KD/SC (ref 4.5–11)
CORTIS SERPL-MCNC: 24.4 UG/DL
CREAT SERPL-MCNC: 0.76 MG/DL (ref 0.51–0.95)
CREAT SERPL-MCNC: 0.88 MG/DL (ref 0.51–0.95)
CREAT SERPL-MCNC: 0.9 MG/DL (ref 0.51–0.95)
CREAT SERPL-MCNC: 0.91 MG/DL (ref 0.51–0.95)
CRP SERPL-MCNC: 70.4 MG/L
DIASTOLIC BLOOD PRESSURE - MUSE: NORMAL MMHG
DIASTOLIC BLOOD PRESSURE - MUSE: NORMAL MMHG
EGFRCR SERPLBLD CKD-EPI 2021: 67 ML/MIN/1.73M2
EGFRCR SERPLBLD CKD-EPI 2021: 68 ML/MIN/1.73M2
EGFRCR SERPLBLD CKD-EPI 2021: 70 ML/MIN/1.73M2
EGFRCR SERPLBLD CKD-EPI 2021: 83 ML/MIN/1.73M2
EOSINOPHIL # BLD AUTO: 0 10E3/UL (ref 0–0.7)
EOSINOPHIL NFR BLD AUTO: 0 %
ERYTHROCYTE [DISTWIDTH] IN BLOOD BY AUTOMATED COUNT: 25.9 % (ref 10–15)
ERYTHROCYTE [DISTWIDTH] IN BLOOD BY AUTOMATED COUNT: 26 % (ref 10–15)
FIBRINOGEN PPP-MCNC: 143 MG/DL (ref 170–510)
GLUCOSE BLDC GLUCOMTR-MCNC: 117 MG/DL (ref 70–99)
GLUCOSE BLDC GLUCOMTR-MCNC: 12 MG/DL (ref 70–99)
GLUCOSE BLDC GLUCOMTR-MCNC: 128 MG/DL (ref 70–99)
GLUCOSE BLDC GLUCOMTR-MCNC: 139 MG/DL (ref 70–99)
GLUCOSE BLDC GLUCOMTR-MCNC: 143 MG/DL (ref 70–99)
GLUCOSE BLDC GLUCOMTR-MCNC: 60 MG/DL (ref 70–99)
GLUCOSE BLDC GLUCOMTR-MCNC: 85 MG/DL (ref 70–99)
GLUCOSE BLDC GLUCOMTR-MCNC: 86 MG/DL (ref 70–99)
GLUCOSE SERPL-MCNC: 113 MG/DL (ref 70–99)
GLUCOSE SERPL-MCNC: 157 MG/DL (ref 70–99)
GLUCOSE SERPL-MCNC: 177 MG/DL (ref 70–99)
GLUCOSE SERPL-MCNC: 71 MG/DL (ref 70–99)
HCO3 BLD-SCNC: 16 MMOL/L (ref 21–28)
HCO3 BLD-SCNC: 17 MMOL/L (ref 21–28)
HCO3 BLD-SCNC: 18 MMOL/L (ref 21–28)
HCO3 BLDV-SCNC: 29 MMOL/L (ref 21–28)
HCO3 SERPL-SCNC: 14 MMOL/L (ref 22–29)
HCO3 SERPL-SCNC: 15 MMOL/L (ref 22–29)
HCO3 SERPL-SCNC: 16 MMOL/L (ref 22–29)
HCO3 SERPL-SCNC: 24 MMOL/L (ref 22–29)
HCT VFR BLD AUTO: 21.4 % (ref 35–47)
HCT VFR BLD AUTO: 24.4 % (ref 35–47)
HGB BLD-MCNC: 7.3 G/DL (ref 11.7–15.7)
HGB BLD-MCNC: 8.2 G/DL (ref 11.7–15.7)
IMM GRANULOCYTES # BLD: 0.4 10E3/UL
IMM GRANULOCYTES NFR BLD: 3 %
INR PPP: 2.27 (ref 0.85–1.15)
INR PPP: 2.39 (ref 0.85–1.15)
INTERPRETATION ECG - MUSE: NORMAL
INTERPRETATION ECG - MUSE: NORMAL
LACTATE SERPL-SCNC: 2.5 MMOL/L (ref 0.7–2)
LACTATE SERPL-SCNC: 2.7 MMOL/L (ref 0.7–2)
LACTATE SERPL-SCNC: 2.8 MMOL/L (ref 0.7–2)
LACTATE SERPL-SCNC: 3.2 MMOL/L (ref 0.7–2)
LACTATE SERPL-SCNC: 3.4 MMOL/L (ref 0.7–2)
LACTATE SERPL-SCNC: 3.9 MMOL/L (ref 0.7–2)
LVEF ECHO: NORMAL
LYMPHOCYTES # BLD AUTO: 0.7 10E3/UL (ref 0.8–5.3)
LYMPHOCYTES NFR BLD AUTO: 5 %
MAGNESIUM SERPL-MCNC: 1.6 MG/DL (ref 1.7–2.3)
MAGNESIUM SERPL-MCNC: 2.4 MG/DL (ref 1.7–2.3)
MCF P HPASE BLD TEG: 37.6 MM (ref 50–70)
MCH RBC QN AUTO: 30.9 PG (ref 26.5–33)
MCH RBC QN AUTO: 31.7 PG (ref 26.5–33)
MCHC RBC AUTO-ENTMCNC: 33.6 G/DL (ref 31.5–36.5)
MCHC RBC AUTO-ENTMCNC: 34.1 G/DL (ref 31.5–36.5)
MCV RBC AUTO: 91 FL (ref 78–100)
MCV RBC AUTO: 94 FL (ref 78–100)
MONOCYTES # BLD AUTO: 0.4 10E3/UL (ref 0–1.3)
MONOCYTES NFR BLD AUTO: 4 %
NEUTROPHILS # BLD AUTO: 10.8 10E3/UL (ref 1.6–8.3)
NEUTROPHILS NFR BLD AUTO: 88 %
NRBC # BLD AUTO: 0 10E3/UL
NRBC BLD AUTO-RTO: 0 /100
O2/TOTAL GAS SETTING VFR VENT: 100 %
O2/TOTAL GAS SETTING VFR VENT: 80 %
OXYHGB MFR BLDA: 100 % (ref 92–100)
OXYHGB MFR BLDA: 99 % (ref 92–100)
OXYHGB MFR BLDA: 99 % (ref 92–100)
OXYHGB MFR BLDV: 97 % (ref 70–75)
P AXIS - MUSE: -1 DEGREES
P AXIS - MUSE: NORMAL DEGREES
PATH REPORT.COMMENTS IMP SPEC: NORMAL
PATH REPORT.COMMENTS IMP SPEC: NORMAL
PATH REPORT.FINAL DX SPEC: NORMAL
PATH REPORT.MICROSCOPIC SPEC OTHER STN: NORMAL
PATH REPORT.MICROSCOPIC SPEC OTHER STN: NORMAL
PATH REPORT.RELEVANT HX SPEC: NORMAL
PCO2 BLD: 23 MM HG (ref 35–45)
PCO2 BLD: 24 MM HG (ref 35–45)
PCO2 BLD: 27 MM HG (ref 35–45)
PCO2 BLDV: 34 MM HG (ref 40–50)
PEEP: 5 CM H2O
PH BLD: 7.43 [PH] (ref 7.35–7.45)
PH BLD: 7.44 [PH] (ref 7.35–7.45)
PH BLD: 7.48 [PH] (ref 7.35–7.45)
PH BLDV: 7.54 [PH] (ref 7.32–7.43)
PHOSPHATE SERPL-MCNC: 2.1 MG/DL (ref 2.5–4.5)
PLAT MORPH BLD: ABNORMAL
PLATELET # BLD AUTO: 10 10E3/UL (ref 150–450)
PLATELET # BLD AUTO: 24 10E3/UL (ref 150–450)
PO2 BLD: 275 MM HG (ref 80–105)
PO2 BLD: 348 MM HG (ref 80–105)
PO2 BLD: 414 MM HG (ref 80–105)
PO2 BLDV: 95 MM HG (ref 25–47)
POTASSIUM SERPL-SCNC: 3.3 MMOL/L (ref 3.4–5.3)
POTASSIUM SERPL-SCNC: 3.4 MMOL/L (ref 3.4–5.3)
POTASSIUM SERPL-SCNC: 4 MMOL/L (ref 3.4–5.3)
POTASSIUM SERPL-SCNC: 4 MMOL/L (ref 3.4–5.3)
POTASSIUM SERPL-SCNC: 4.6 MMOL/L (ref 3.4–5.3)
PR INTERVAL - MUSE: 164 MS
PR INTERVAL - MUSE: NORMAL MS
PROCALCITONIN SERPL IA-MCNC: 0.27 NG/ML
PROT SERPL-MCNC: 2.6 G/DL (ref 6.4–8.3)
PROT SERPL-MCNC: 3.1 G/DL (ref 6.4–8.3)
PROT SERPL-MCNC: 3.1 G/DL (ref 6.4–8.3)
PROT SERPL-MCNC: 3.2 G/DL (ref 6.4–8.3)
QRS DURATION - MUSE: 56 MS
QRS DURATION - MUSE: 90 MS
QT - MUSE: 324 MS
QT - MUSE: 378 MS
QTC - MUSE: 401 MS
QTC - MUSE: 430 MS
R AXIS - MUSE: 106 DEGREES
R AXIS - MUSE: 38 DEGREES
RBC # BLD AUTO: 2.36 10E6/UL (ref 3.8–5.2)
RBC # BLD AUTO: 2.59 10E6/UL (ref 3.8–5.2)
RBC MORPH BLD: ABNORMAL
SAO2 % BLDA: >100 % (ref 95–96)
SAO2 % BLDV: 99.1 % (ref 70–75)
SODIUM SERPL-SCNC: 136 MMOL/L (ref 135–145)
SODIUM SERPL-SCNC: 137 MMOL/L (ref 135–145)
SODIUM SERPL-SCNC: 138 MMOL/L (ref 135–145)
SODIUM SERPL-SCNC: 147 MMOL/L (ref 135–145)
SPECIMEN EXP DATE BLD: NORMAL
SYSTOLIC BLOOD PRESSURE - MUSE: NORMAL MMHG
SYSTOLIC BLOOD PRESSURE - MUSE: NORMAL MMHG
T AXIS - MUSE: -34 DEGREES
T AXIS - MUSE: 39 DEGREES
TARGETS BLD QL SMEAR: SLIGHT
TROPONIN T SERPL HS-MCNC: 125 NG/L
TROPONIN T SERPL HS-MCNC: 152 NG/L
TROPONIN T SERPL HS-MCNC: 350 NG/L
TSH SERPL DL<=0.005 MIU/L-ACNC: 1.81 UIU/ML (ref 0.3–4.2)
VENTRICULAR RATE- MUSE: 106 BPM
VENTRICULAR RATE- MUSE: 68 BPM
WBC # BLD AUTO: 12.3 10E3/UL (ref 4–11)
WBC # BLD AUTO: 8.9 10E3/UL (ref 4–11)

## 2025-03-21 PROCEDURE — 71275 CT ANGIOGRAPHY CHEST: CPT | Mod: 26 | Performed by: RADIOLOGY

## 2025-03-21 PROCEDURE — 36415 COLL VENOUS BLD VENIPUNCTURE: CPT

## 2025-03-21 PROCEDURE — 86140 C-REACTIVE PROTEIN: CPT | Performed by: PHYSICIAN ASSISTANT

## 2025-03-21 PROCEDURE — 85730 THROMBOPLASTIN TIME PARTIAL: CPT

## 2025-03-21 PROCEDURE — 84484 ASSAY OF TROPONIN QUANT: CPT

## 2025-03-21 PROCEDURE — 258N000003 HC RX IP 258 OP 636

## 2025-03-21 PROCEDURE — 99232 SBSQ HOSP IP/OBS MODERATE 35: CPT | Performed by: PHYSICIAN ASSISTANT

## 2025-03-21 PROCEDURE — 82805 BLOOD GASES W/O2 SATURATION: CPT

## 2025-03-21 PROCEDURE — 3E043XZ INTRODUCTION OF VASOPRESSOR INTO CENTRAL VEIN, PERCUTANEOUS APPROACH: ICD-10-PCS | Performed by: EMERGENCY MEDICINE

## 2025-03-21 PROCEDURE — 3E0G76Z INTRODUCTION OF NUTRITIONAL SUBSTANCE INTO UPPER GI, VIA NATURAL OR ARTIFICIAL OPENING: ICD-10-PCS | Performed by: EMERGENCY MEDICINE

## 2025-03-21 PROCEDURE — 70496 CT ANGIOGRAPHY HEAD: CPT

## 2025-03-21 PROCEDURE — 999N000215 HC STATISTIC HFNC ADULT NON-CPAP

## 2025-03-21 PROCEDURE — 82330 ASSAY OF CALCIUM: CPT

## 2025-03-21 PROCEDURE — 84100 ASSAY OF PHOSPHORUS: CPT

## 2025-03-21 PROCEDURE — 3E043XZ INTRODUCTION OF VASOPRESSOR INTO CENTRAL VEIN, PERCUTANEOUS APPROACH: ICD-10-PCS | Performed by: INTERNAL MEDICINE

## 2025-03-21 PROCEDURE — 250N000011 HC RX IP 250 OP 636

## 2025-03-21 PROCEDURE — 85384 FIBRINOGEN ACTIVITY: CPT

## 2025-03-21 PROCEDURE — 0DHA7UZ INSERTION OF FEEDING DEVICE INTO JEJUNUM, VIA NATURAL OR ARTIFICIAL OPENING: ICD-10-PCS | Performed by: INTERNAL MEDICINE

## 2025-03-21 PROCEDURE — 36620 INSERTION CATHETER ARTERY: CPT | Mod: GC | Performed by: INTERNAL MEDICINE

## 2025-03-21 PROCEDURE — 3E0G76Z INTRODUCTION OF NUTRITIONAL SUBSTANCE INTO UPPER GI, VIA NATURAL OR ARTIFICIAL OPENING: ICD-10-PCS | Performed by: INTERNAL MEDICINE

## 2025-03-21 PROCEDURE — 82805 BLOOD GASES W/O2 SATURATION: CPT | Performed by: INTERNAL MEDICINE

## 2025-03-21 PROCEDURE — 83735 ASSAY OF MAGNESIUM: CPT

## 2025-03-21 PROCEDURE — 999N000197 HC STATISTIC WOC PT EDUCATION, 0-15 MIN

## 2025-03-21 PROCEDURE — 44500 INTRO GASTROINTESTINAL TUBE: CPT | Mod: GC | Performed by: STUDENT IN AN ORGANIZED HEALTH CARE EDUCATION/TRAINING PROGRAM

## 2025-03-21 PROCEDURE — 82533 TOTAL CORTISOL: CPT

## 2025-03-21 PROCEDURE — 250N000013 HC RX MED GY IP 250 OP 250 PS 637

## 2025-03-21 PROCEDURE — 250N000009 HC RX 250

## 2025-03-21 PROCEDURE — 94003 VENT MGMT INPAT SUBQ DAY: CPT

## 2025-03-21 PROCEDURE — 95720 EEG PHY/QHP EA INCR W/VEEG: CPT | Performed by: PSYCHIATRY & NEUROLOGY

## 2025-03-21 PROCEDURE — 83605 ASSAY OF LACTIC ACID: CPT | Performed by: INTERNAL MEDICINE

## 2025-03-21 PROCEDURE — 999N000185 HC STATISTIC TRANSPORT TIME EA 15 MIN

## 2025-03-21 PROCEDURE — 258N000003 HC RX IP 258 OP 636: Performed by: STUDENT IN AN ORGANIZED HEALTH CARE EDUCATION/TRAINING PROGRAM

## 2025-03-21 PROCEDURE — 87040 BLOOD CULTURE FOR BACTERIA: CPT

## 2025-03-21 PROCEDURE — 999N000026 HC STATISTIC CARDIOPULM RESUSCITATION

## 2025-03-21 PROCEDURE — 74177 CT ABD & PELVIS W/CONTRAST: CPT | Mod: 26 | Performed by: STUDENT IN AN ORGANIZED HEALTH CARE EDUCATION/TRAINING PROGRAM

## 2025-03-21 PROCEDURE — 93306 TTE W/DOPPLER COMPLETE: CPT

## 2025-03-21 PROCEDURE — P9045 ALBUMIN (HUMAN), 5%, 250 ML: HCPCS | Performed by: INTERNAL MEDICINE

## 2025-03-21 PROCEDURE — 93005 ELECTROCARDIOGRAM TRACING: CPT

## 2025-03-21 PROCEDURE — 258N000003 HC RX IP 258 OP 636: Performed by: INTERNAL MEDICINE

## 2025-03-21 PROCEDURE — 250N000011 HC RX IP 250 OP 636: Performed by: STUDENT IN AN ORGANIZED HEALTH CARE EDUCATION/TRAINING PROGRAM

## 2025-03-21 PROCEDURE — 999N000157 HC STATISTIC RCP TIME EA 10 MIN

## 2025-03-21 PROCEDURE — 85396 CLOTTING ASSAY WHOLE BLOOD: CPT | Performed by: INTERNAL MEDICINE

## 2025-03-21 PROCEDURE — 93010 ELECTROCARDIOGRAM REPORT: CPT | Performed by: INTERNAL MEDICINE

## 2025-03-21 PROCEDURE — 74340 X-RAY GUIDE FOR GI TUBE: CPT | Mod: 26 | Performed by: STUDENT IN AN ORGANIZED HEALTH CARE EDUCATION/TRAINING PROGRAM

## 2025-03-21 PROCEDURE — 250N000011 HC RX IP 250 OP 636: Mod: JZ | Performed by: INTERNAL MEDICINE

## 2025-03-21 PROCEDURE — 74340 X-RAY GUIDE FOR GI TUBE: CPT

## 2025-03-21 PROCEDURE — 95714 VEEG EA 12-26 HR UNMNTR: CPT

## 2025-03-21 PROCEDURE — 84443 ASSAY THYROID STIM HORMONE: CPT

## 2025-03-21 PROCEDURE — 99291 CRITICAL CARE FIRST HOUR: CPT | Mod: GC | Performed by: PSYCHIATRY & NEUROLOGY

## 2025-03-21 PROCEDURE — 999N000035 HC STATISTIC CODE BLUE NO ACCESS REQUIRED

## 2025-03-21 PROCEDURE — 86923 COMPATIBILITY TEST ELECTRIC: CPT

## 2025-03-21 PROCEDURE — 85610 PROTHROMBIN TIME: CPT

## 2025-03-21 PROCEDURE — 99292 CRITICAL CARE ADDL 30 MIN: CPT | Mod: 25 | Performed by: INTERNAL MEDICINE

## 2025-03-21 PROCEDURE — 93306 TTE W/DOPPLER COMPLETE: CPT | Mod: 26 | Performed by: STUDENT IN AN ORGANIZED HEALTH CARE EDUCATION/TRAINING PROGRAM

## 2025-03-21 PROCEDURE — 84132 ASSAY OF SERUM POTASSIUM: CPT | Performed by: STUDENT IN AN ORGANIZED HEALTH CARE EDUCATION/TRAINING PROGRAM

## 2025-03-21 PROCEDURE — 86901 BLOOD TYPING SEROLOGIC RH(D): CPT | Performed by: INTERNAL MEDICINE

## 2025-03-21 PROCEDURE — 70498 CT ANGIOGRAPHY NECK: CPT | Mod: 77

## 2025-03-21 PROCEDURE — 85025 COMPLETE CBC W/AUTO DIFF WBC: CPT

## 2025-03-21 PROCEDURE — 250N000009 HC RX 250: Performed by: STUDENT IN AN ORGANIZED HEALTH CARE EDUCATION/TRAINING PROGRAM

## 2025-03-21 PROCEDURE — 250N000011 HC RX IP 250 OP 636: Performed by: INTERNAL MEDICINE

## 2025-03-21 PROCEDURE — 85027 COMPLETE CBC AUTOMATED: CPT

## 2025-03-21 PROCEDURE — 83605 ASSAY OF LACTIC ACID: CPT | Performed by: STUDENT IN AN ORGANIZED HEALTH CARE EDUCATION/TRAINING PROGRAM

## 2025-03-21 PROCEDURE — 70496 CT ANGIOGRAPHY HEAD: CPT | Mod: 26 | Performed by: RADIOLOGY

## 2025-03-21 PROCEDURE — 999N000248 HC STATISTIC IV INSERT WITH US BY RN

## 2025-03-21 PROCEDURE — 83605 ASSAY OF LACTIC ACID: CPT

## 2025-03-21 PROCEDURE — 99291 CRITICAL CARE FIRST HOUR: CPT | Mod: 25 | Performed by: INTERNAL MEDICINE

## 2025-03-21 PROCEDURE — 74177 CT ABD & PELVIS W/CONTRAST: CPT

## 2025-03-21 PROCEDURE — 70450 CT HEAD/BRAIN W/O DYE: CPT

## 2025-03-21 PROCEDURE — 200N000002 HC R&B ICU UMMC

## 2025-03-21 PROCEDURE — 258N000001 HC RX 258

## 2025-03-21 PROCEDURE — 250N000011 HC RX IP 250 OP 636: Mod: JZ

## 2025-03-21 PROCEDURE — 70450 CT HEAD/BRAIN W/O DYE: CPT | Mod: 77

## 2025-03-21 PROCEDURE — 71275 CT ANGIOGRAPHY CHEST: CPT

## 2025-03-21 PROCEDURE — 84145 PROCALCITONIN (PCT): CPT

## 2025-03-21 PROCEDURE — 82805 BLOOD GASES W/O2 SATURATION: CPT | Performed by: STUDENT IN AN ORGANIZED HEALTH CARE EDUCATION/TRAINING PROGRAM

## 2025-03-21 PROCEDURE — 70498 CT ANGIOGRAPHY NECK: CPT | Mod: 26 | Performed by: RADIOLOGY

## 2025-03-21 PROCEDURE — 0DHA7UZ INSERTION OF FEEDING DEVICE INTO JEJUNUM, VIA NATURAL OR ARTIFICIAL OPENING: ICD-10-PCS | Performed by: EMERGENCY MEDICINE

## 2025-03-21 PROCEDURE — 999N000034 HC STATISTIC CODE BLUE ACCESS REQUIRED

## 2025-03-21 PROCEDURE — 5A1955Z RESPIRATORY VENTILATION, GREATER THAN 96 CONSECUTIVE HOURS: ICD-10-PCS | Performed by: EMERGENCY MEDICINE

## 2025-03-21 PROCEDURE — 84155 ASSAY OF PROTEIN SERUM: CPT

## 2025-03-21 PROCEDURE — 80053 COMPREHEN METABOLIC PANEL: CPT

## 2025-03-21 PROCEDURE — 36556 INSERT NON-TUNNEL CV CATH: CPT | Mod: GC | Performed by: INTERNAL MEDICINE

## 2025-03-21 RX ORDER — PIPERACILLIN SODIUM, TAZOBACTAM SODIUM 4; .5 G/20ML; G/20ML
4.5 INJECTION, POWDER, LYOPHILIZED, FOR SOLUTION INTRAVENOUS EVERY 6 HOURS
Status: DISCONTINUED | OUTPATIENT
Start: 2025-03-21 | End: 2025-03-27

## 2025-03-21 RX ORDER — AMOXICILLIN 250 MG
1 CAPSULE ORAL 2 TIMES DAILY PRN
Status: DISCONTINUED | OUTPATIENT
Start: 2025-03-21 | End: 2025-04-04 | Stop reason: HOSPADM

## 2025-03-21 RX ORDER — LIDOCAINE HYDROCHLORIDE 20 MG/ML
5 SOLUTION OROPHARYNGEAL ONCE
Status: DISCONTINUED | OUTPATIENT
Start: 2025-03-21 | End: 2025-04-04 | Stop reason: HOSPADM

## 2025-03-21 RX ORDER — DEXTROSE MONOHYDRATE 100 MG/ML
INJECTION, SOLUTION INTRAVENOUS CONTINUOUS PRN
Status: DISCONTINUED | OUTPATIENT
Start: 2025-03-21 | End: 2025-03-28

## 2025-03-21 RX ORDER — LIDOCAINE HYDROCHLORIDE 20 MG/ML
10 SOLUTION OROPHARYNGEAL ONCE
Status: COMPLETED | OUTPATIENT
Start: 2025-03-21 | End: 2025-03-21

## 2025-03-21 RX ORDER — AMOXICILLIN 250 MG
2 CAPSULE ORAL 2 TIMES DAILY PRN
Status: DISCONTINUED | OUTPATIENT
Start: 2025-03-21 | End: 2025-04-04 | Stop reason: HOSPADM

## 2025-03-21 RX ORDER — THIAMINE HYDROCHLORIDE 100 MG/ML
250 INJECTION, SOLUTION INTRAMUSCULAR; INTRAVENOUS DAILY
Status: DISCONTINUED | OUTPATIENT
Start: 2025-03-23 | End: 2025-03-21

## 2025-03-21 RX ORDER — MULTIPLE VITAMINS W/ MINERALS TAB 9MG-400MCG
1 TAB ORAL DAILY
Status: DISCONTINUED | OUTPATIENT
Start: 2025-03-21 | End: 2025-03-28

## 2025-03-21 RX ORDER — CALCIUM CARBONATE 500 MG/1
1000 TABLET, CHEWABLE ORAL 4 TIMES DAILY PRN
Status: DISCONTINUED | OUTPATIENT
Start: 2025-03-21 | End: 2025-04-04 | Stop reason: HOSPADM

## 2025-03-21 RX ORDER — IOPAMIDOL 755 MG/ML
174 INJECTION, SOLUTION INTRAVASCULAR ONCE
Status: COMPLETED | OUTPATIENT
Start: 2025-03-21 | End: 2025-03-21

## 2025-03-21 RX ORDER — POTASSIUM CHLORIDE 29.8 MG/ML
20 INJECTION INTRAVENOUS
Status: COMPLETED | OUTPATIENT
Start: 2025-03-21 | End: 2025-03-21

## 2025-03-21 RX ORDER — EPINEPHRINE IN 0.9 % SOD CHLOR 5 MG/250ML
.01-.3 PLASTIC BAG, INJECTION (ML) INTRAVENOUS CONTINUOUS
Status: DISCONTINUED | OUTPATIENT
Start: 2025-03-21 | End: 2025-03-21

## 2025-03-21 RX ORDER — THIAMINE HYDROCHLORIDE 100 MG/ML
500 INJECTION, SOLUTION INTRAMUSCULAR; INTRAVENOUS 3 TIMES DAILY
Status: COMPLETED | OUTPATIENT
Start: 2025-03-22 | End: 2025-03-22

## 2025-03-21 RX ORDER — MAGNESIUM SULFATE HEPTAHYDRATE 40 MG/ML
2 INJECTION, SOLUTION INTRAVENOUS ONCE
Status: COMPLETED | OUTPATIENT
Start: 2025-03-21 | End: 2025-03-21

## 2025-03-21 RX ORDER — THIAMINE HYDROCHLORIDE 100 MG/ML
500 INJECTION, SOLUTION INTRAMUSCULAR; INTRAVENOUS 3 TIMES DAILY
Status: DISCONTINUED | OUTPATIENT
Start: 2025-03-21 | End: 2025-03-21

## 2025-03-21 RX ORDER — IOPAMIDOL 755 MG/ML
164 INJECTION, SOLUTION INTRAVASCULAR ONCE
Status: COMPLETED | OUTPATIENT
Start: 2025-03-21 | End: 2025-03-21

## 2025-03-21 RX ORDER — PHENYLEPHRINE HCL IN 0.9% NACL 50MG/250ML
.1-6 PLASTIC BAG, INJECTION (ML) INTRAVENOUS CONTINUOUS
Status: DISCONTINUED | OUTPATIENT
Start: 2025-03-21 | End: 2025-03-27

## 2025-03-21 RX ORDER — CALCIUM GLUCONATE 20 MG/ML
1 INJECTION, SOLUTION INTRAVENOUS ONCE
Status: COMPLETED | OUTPATIENT
Start: 2025-03-21 | End: 2025-03-21

## 2025-03-21 RX ORDER — THIAMINE HYDROCHLORIDE 100 MG/ML
250 INJECTION, SOLUTION INTRAMUSCULAR; INTRAVENOUS DAILY
Status: COMPLETED | OUTPATIENT
Start: 2025-03-23 | End: 2025-03-27

## 2025-03-21 RX ADMIN — Medication 1 SPRAY: at 12:29

## 2025-03-21 RX ADMIN — LINEZOLID 600 MG: 600 INJECTION, SOLUTION INTRAVENOUS at 03:25

## 2025-03-21 RX ADMIN — DEXTROSE MONOHYDRATE 50 ML: 25 INJECTION, SOLUTION INTRAVENOUS at 16:19

## 2025-03-21 RX ADMIN — SODIUM CHLORIDE, SODIUM LACTATE, POTASSIUM CHLORIDE, AND CALCIUM CHLORIDE 1000 ML: .6; .31; .03; .02 INJECTION, SOLUTION INTRAVENOUS at 06:16

## 2025-03-21 RX ADMIN — Medication 25 MCG/HR: at 00:56

## 2025-03-21 RX ADMIN — CHLORHEXIDINE GLUCONATE 15 ML: 1.2 SOLUTION ORAL at 20:02

## 2025-03-21 RX ADMIN — PANTOPRAZOLE SODIUM 40 MG: 40 INJECTION, POWDER, FOR SOLUTION INTRAVENOUS at 08:47

## 2025-03-21 RX ADMIN — THIAMINE HYDROCHLORIDE 500 MG: 100 INJECTION, SOLUTION INTRAMUSCULAR; INTRAVENOUS at 08:44

## 2025-03-21 RX ADMIN — POTASSIUM CHLORIDE 20 MEQ: 29.8 INJECTION, SOLUTION INTRAVENOUS at 07:27

## 2025-03-21 RX ADMIN — Medication 1 SPRAY: at 16:40

## 2025-03-21 RX ADMIN — LINEZOLID 600 MG: 600 INJECTION, SOLUTION INTRAVENOUS at 16:43

## 2025-03-21 RX ADMIN — Medication 1.5 MCG/KG/MIN: at 22:09

## 2025-03-21 RX ADMIN — PIPERACILLIN AND TAZOBACTAM 4.5 G: 4; .5 INJECTION, POWDER, LYOPHILIZED, FOR SOLUTION INTRAVENOUS at 08:50

## 2025-03-21 RX ADMIN — Medication 1 TABLET: at 16:43

## 2025-03-21 RX ADMIN — LEVETIRACETAM 750 MG: 500 INJECTION, SOLUTION INTRAVENOUS at 12:25

## 2025-03-21 RX ADMIN — SODIUM CHLORIDE, PRESERVATIVE FREE 100 ML: 5 INJECTION INTRAVENOUS at 15:04

## 2025-03-21 RX ADMIN — SODIUM CHLORIDE, SODIUM LACTATE, POTASSIUM CHLORIDE, AND CALCIUM CHLORIDE 500 ML: .6; .31; .03; .02 INJECTION, SOLUTION INTRAVENOUS at 14:08

## 2025-03-21 RX ADMIN — LEVETIRACETAM 750 MG: 500 INJECTION, SOLUTION INTRAVENOUS at 20:53

## 2025-03-21 RX ADMIN — THIAMINE HYDROCHLORIDE 500 MG: 100 INJECTION, SOLUTION INTRAMUSCULAR; INTRAVENOUS at 14:10

## 2025-03-21 RX ADMIN — EPINEPHRINE 0.01 MCG/KG/MIN: 1 INJECTION INTRAMUSCULAR; INTRAVENOUS; SUBCUTANEOUS at 02:40

## 2025-03-21 RX ADMIN — IOPAMIDOL 221 ML: 755 INJECTION, SOLUTION INTRAVENOUS at 15:04

## 2025-03-21 RX ADMIN — POTASSIUM PHOSPHATE, MONOBASIC POTASSIUM PHOSPHATE, DIBASIC 9 MMOL: 224; 236 INJECTION, SOLUTION, CONCENTRATE INTRAVENOUS at 08:52

## 2025-03-21 RX ADMIN — MAGNESIUM SULFATE HEPTAHYDRATE 2 G: 2 INJECTION, SOLUTION INTRAVENOUS at 06:29

## 2025-03-21 RX ADMIN — LIDOCAINE HYDROCHLORIDE 5 ML: 20 SOLUTION ORAL at 16:09

## 2025-03-21 RX ADMIN — IOPAMIDOL 67 ML: 755 INJECTION, SOLUTION INTRAVENOUS at 01:49

## 2025-03-21 RX ADMIN — THIAMINE HYDROCHLORIDE 500 MG: 100 INJECTION, SOLUTION INTRAMUSCULAR; INTRAVENOUS at 20:02

## 2025-03-21 RX ADMIN — CALCIUM GLUCONATE 1 G: 20 INJECTION, SOLUTION INTRAVENOUS at 09:13

## 2025-03-21 RX ADMIN — ALBUMIN HUMAN 12.5 G: 0.05 INJECTION, SOLUTION INTRAVENOUS at 09:12

## 2025-03-21 RX ADMIN — SODIUM CHLORIDE, PRESERVATIVE FREE 180 ML: 5 INJECTION INTRAVENOUS at 15:04

## 2025-03-21 RX ADMIN — Medication 0.5 MCG/KG/MIN: at 09:05

## 2025-03-21 RX ADMIN — PROPOFOL 5 MCG/KG/MIN: 10 INJECTION, EMULSION INTRAVENOUS at 00:55

## 2025-03-21 RX ADMIN — ALBUMIN HUMAN 12.5 G: 0.05 INJECTION, SOLUTION INTRAVENOUS at 09:13

## 2025-03-21 RX ADMIN — CHLORHEXIDINE GLUCONATE 15 ML: 1.2 SOLUTION ORAL at 08:43

## 2025-03-21 RX ADMIN — PIPERACILLIN AND TAZOBACTAM 4.5 G: 4; .5 INJECTION, POWDER, LYOPHILIZED, FOR SOLUTION INTRAVENOUS at 02:48

## 2025-03-21 RX ADMIN — PIPERACILLIN AND TAZOBACTAM 4.5 G: 4; .5 INJECTION, POWDER, LYOPHILIZED, FOR SOLUTION INTRAVENOUS at 14:16

## 2025-03-21 RX ADMIN — Medication 1 SPRAY: at 09:41

## 2025-03-21 RX ADMIN — DEXTROSE MONOHYDRATE 50 ML: 25 INJECTION, SOLUTION INTRAVENOUS at 12:13

## 2025-03-21 RX ADMIN — SODIUM CHLORIDE, POTASSIUM CHLORIDE, SODIUM LACTATE AND CALCIUM CHLORIDE 1000 ML: 600; 310; 30; 20 INJECTION, SOLUTION INTRAVENOUS at 08:27

## 2025-03-21 RX ADMIN — POTASSIUM CHLORIDE 20 MEQ: 29.8 INJECTION, SOLUTION INTRAVENOUS at 06:30

## 2025-03-21 RX ADMIN — SODIUM CHLORIDE, SODIUM LACTATE, POTASSIUM CHLORIDE, AND CALCIUM CHLORIDE 1000 ML: .6; .31; .03; .02 INJECTION, SOLUTION INTRAVENOUS at 01:47

## 2025-03-21 RX ADMIN — SODIUM CHLORIDE 100 ML: 9 INJECTION, SOLUTION INTRAVENOUS at 01:50

## 2025-03-21 RX ADMIN — PIPERACILLIN AND TAZOBACTAM 4.5 G: 4; .5 INJECTION, POWDER, LYOPHILIZED, FOR SOLUTION INTRAVENOUS at 20:58

## 2025-03-21 ASSESSMENT — ACTIVITIES OF DAILY LIVING (ADL)
ADLS_ACUITY_SCORE: 96

## 2025-03-21 NOTE — CONSULTS
"[facility]  WO Nurse Inpatient Assessment     Consulted for: coccyx  3/7: New consult for wounds to bilateral legs  3/17: New consult for wound around SP cath  3/20: New consult for PI on buttocks  3/21: repeat consult for legs/labia after transfer to ICU, continue plan from yesterday below, WO will follow up next week     Summary: known by WO from previous admission     WOC nurse follow-up plan: weekly    Patient History (according to provider note(s):      The patient is a 69 year old female, with an extensive PMHx which is nicely documented in Dr. Khan and Mis's note from the ER.  Per their notes:  Ms. 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  The patient reportedly was feeling sick and her \"caretakers\" wanted her seen by a physician.  Apparently she has not been feeling well since her last discharge from the hospital with increased confusion, poor appetite and increased generalized weakness which now she is clearly confined to her bed because of this. Her urine output(UOP) has been foul smelling and lower amounts.  The patient refused to come to the ER 2 days ago.  The patient denies any other complaints but again has a difficult time offering a cogent history as to how she is feeling and what she's complaining of.  The remainder of the history of present illness is limited given the patient's altered mental status    Assessment:      Areas visualized during today's visit: Focused: and Abdomen    Wound location: Lower abdomen around SP catheter      Last photo: 3/20  Wound due to: Friction and Moisture Associated Skin Damage (MASD)  Wound history/plan of care: Skin break down " noted by bedside RN. Pt has chronic indwelling SP cath. Tube was exchanged at bedside by Urology 3/17  Wound base: 100 % Erythema and superficial erosion     Palpation of the wound bed: normal      Drainage: none     Description of drainage: none     Measurements (length x width x depth, in cm): See photos     Tunneling: N/A     Undermining: N/A  Periwound skin: Superficial erosion      Color: pink      Temperature: normal   Odor: none  Pain: moderate, tender  Pain interventions prior to dressing change: slow and gentle cares   Treatment goal: Heal  and Protection  STATUS: deteriorating  Supplies ordered: at bedside     Wound location: Bilateral legs     Left medial lower leg        Left posterior thigh      Right Calf        Right hip        Last photo: 3/20  Wound due to:  Edema blisters  Wound history/plan of care: Pt had large edema blisters on a previous admission that unroofed. Large wound on left medial leg 90% epithelialized.  3/20: Pt has a large amount of weeping edema from all open wounds.  Wound base: 50 % Dermis, 50 % new pink Epidermis -     Palpation of the wound bed: normal      Drainage: small     Description of drainage: serous     Measurements (length x width x depth, in cm): See photos     Tunneling: N/A     Undermining: N/A  Periwound skin: Intact, Ecchymosis, and Edematous - there is purple mottling scattered around bilateral legs      Color: normal and consistent with surrounding tissue      Temperature: normal   Odor: none  Pain: moderate and during dressing change, aching  Pain interventions prior to dressing change: slow and gentle cares   Treatment goal: Heal  and Protection  STATUS: healing  Supplies ordered: at bedside     Pressure Injury Location: Sacrum/coccyx                            2/18          Last photo: 3/20  Wound type: Pressure Injury, Shear, and Moisture Associated Skin Damage (MASD)     Pressure Injury Stage: site of previously healed pressure injury, present on admission      Wound history/plan of care:   unknown worst stage, currently appears to be a stage 2 but was possibly deeper. Was noted as a reinjury on her last assessment here 12/4/24 and 1/27/25  Wound base: 80 % Fibrin, 20% dermis.      Palpation of the wound bed: normal      Drainage: small     Description of drainage: none     Measurements (length x width x depth, in cm) total area 7 x 4 x 0.1 cm   Periwound skin: Intact and Erythema- blanchable      Color: normal and consistent with surrounding tissue      Temperature: normal   Odor: none  Pain: moderate, tender  Pain intervention prior to dressing change: slow and gentle cares   Treatment goal: Heal  and Protection  STATUS: stable  Supplies ordered: discussed with RN and discussed with patient     Wound location: Yaa-anal area, inner thighs        Last photo: 3/20  Wound due to: Friction, Incontinence Associated Dermatitis (IAD), and Moisture Associated Skin Damage (MASD)  Wound history/plan of care: Pt has been non ambulatory. Per RN Pt had frequent loose stools overnight and has been incontinent of bowel this admission. Pt has a SP cath for urine. Wounds appear consistent with severe MASD. Pt has present on admission pressure injuries to sacrum/coccyx area. Pt also has weeping edema that is leaking out of exposed areas of dermis on inner thighs, exacerbating the moisture issue.   Wound base: 80 % Dermis, 20 % Fibrin     Palpation of the wound bed: normal      Drainage: copious     Description of drainage: serous     Measurements (length x width x depth, in cm): See photos     Tunneling: N/A     Undermining: N/A  Periwound skin: Edematous, Macerated, and Superficial erosion      Color: dusky, pink, and red      Temperature: normal   Odor: none  Pain: no grimacing or signs of discomfort, none  Pain interventions prior to dressing change: slow and gentle cares   Treatment goal: Decrease moisture and Protection  STATUS: initial assessment  Supplies ordered: at bedside  and discussed with RN       Treatment Plan:     Inner thigh wounds: PRN, gently cleanse with saline and pat dry. Place one 9x11 Mextra super absorbent pad (#[id number]) against each right and left inner thigh. Change pad PRN for drainage.    Lower abdomen around SP cath: BID and as needed.   Cleanse the area with Marry cleanse and protect, very gently with soft cloth.  Apply ostomy powder (#402404) on all open and denuded skin.  Apply thin layer of Barrier paste (ex: Critic aid) on top of it.  With repeat application, do not scrub the paste, only remove soiled paste and reapply.  If complete removal of paste is necessary use baby oil/mineral oil (#207753) and soft wash cloth.    Sacrum/Buttocks/Yaa-anal area: BID and as needed.   Cleanse the area with Marry cleanse and protect, very gently with soft cloth.  Apply ostomy powder (#[id number]) on all open and denuded skin.  Apply thin layer of Barrier paste (ex: Critic aid) on top of it.  With repeat application, do not scrub the paste, only remove soiled paste and reapply.  If complete removal of paste is necessary use baby oil/mineral oil (#392249) and soft wash cloth.  Ensure pt has chair cushion (#270242) while sitting up in the chair.  Use only one blue pad in between mattress and pt. No brief in bed.      Bilateral lower legs wounds: Every other day: remove dressings and wash wounds with Vashe and gauze. Pat dry. Cover open areas with Vaseline gauze and secure with large Mepilex or ABD and stretch netting. If using Mepilex, note patient has very delicate skin so care should be taken to prevent worsening skin tears when removing.     RLE shin wound: PRN Place a 5x5 Mextra super absorbent pad (477600) on small open area on right shin. Change PRN for drainage.    Pressure Injury Prevention (PIP) Plan:  If patient is declining pressure injury prevention interventions: Explore reason why and address patient's concerns, Educate on pressure injury risk and prevention  "intervention(s), If patient is still declining, document \"informed refusal\" , and Ensure Care team is aware ( provider, charge nurse, etc)  Mattress: Follow bed algorithm, add Low Air Loss (Air+) mattress pump if skin is very moist or constantly moist.   HOB: Maintain at or below 30 degrees, unless contraindicated  Repositioning in bed: Every 1-2 hours , Left/right positioning; avoid supine, Raise foot of bed prior to raising head of bed, to reduce patient sliding down (shear), and Frequent microturns using positioning wedges, as patient tolerates  Heels: Pillows under calves  Protective Dressing: Sacral Mepilex for prevention (#770854),  especially for the agitated patient   Positioning Equipment:None  Chair positioning: Chair cushion (#[id number]) , Assist patient to reposition hourly, and Do NOT use a donut for sitting (this increases pressure to smaller area and creates a higher potential for injury)    If patient has a buttock pressure injury, or high risk for PI use chair cushion or SPS.  Moisture Management: Perineal cleansing /protection: Follow Incontinence Protocol, Avoid brief in bed, Clean and dry skin folds with bathing , Consider InterDry (#443715) between folds, and Moisturize dry skin  Under Devices: Inspect skin under all medical devices during skin inspection , Ensure tubes are stabilized without tension, and Ensure patient is not lying on medical devices or equipment when repositioned  Ask provider to discontinue device when no longer needed.     Orders: Reviewed and Written    RECOMMEND PRIMARY TEAM ORDER: None, at this time  Education provided: importance of repositioning, plan of care, and wound progress  Discussed plan of care with: Patient and Nurse  Notify [facility] if wound(s) deteriorate.  Nursing to notify the Provider(s) and re-consult the WO Nurse if new skin concern.    DATA:     Current support surface: Standard  Standard gel mattress (Isoflex)  Containment of urine/stool: Incontinence " Protocol and Indwelling catheter  BMI: Body mass index is 28.27 kg/m .   Active diet order: Orders Placed This Encounter      NPO for Medical/Clinical Reasons Except for: No Exceptions     Output: I/O last 3 completed shifts:  In: 3367.5 [I.V.:1367.5; IV Piggyback:2000]  Out: 110 [Urine:110]     Labs:   Recent Labs   Lab 03/21/25  0427 03/21/25  0010   ALBUMIN 1.8* 1.5*   HGB 8.2* 7.3*   INR  --  2.39*   WBC 12.3* 8.9     Pressure injury risk assessment:   Sensory Perception: 1-->completely limited  Moisture: 2-->very moist  Activity: 1-->bedfast  Mobility: 1-->completely immobile  Nutrition: 1-->very poor  Friction and Shear: 1-->problem  Yogi Score: 7    Gaurang Barba RN, CWOCN  Pager no longer in use, please contact through [employer] group: [facility] Nurse [place]    Dept. Office Number: [phone]

## 2025-03-21 NOTE — PROGRESS NOTES
CLINICAL NUTRITION SERVICES - BRIEF NOTE    See RD note 3/19 for full assessment.     Nutrition Prescription    RECOMMENDATIONS FOR MDs/PROVIDERS TO ORDER:  Patient is high risk for refeeding syndrome. Discussed in rounds.     Recommendations already ordered by Registered Dietitian (RD):  EN access: PPFT to be placed   Goal TF: Pivot 1.5 Aj (or equivalent) @ goal of  45ml/hr  (1080ml/day) provides: 1620 kcals, 101 g PRO, 810 ml free H20, 186 g CHO, and 8 g fiber daily.    Initiate @ 10 ml/hr and advance by 10 ml Q8H pending pt's tolerance.  Do not advance TF rate unless Mg++ > 1.5, K+ is > 3, and phos > 1.9  30 ml Q4H fluid flushes for tube patency. Additional fluids and/or adjustments per MD.    Multivitamin/minerals to help ensure micronutrient needs being met with suspected hypermetabolic demands and potential interruptions to TF infusions.   Continue 100 mg Thiamine x 5-7 days to prevent lyte depletion d/t at risk for refeeding syndrome    Ordered updated weight  Expedite supplement once daily provides 60 mL, 100 kcal, 10 g protein, 15 g carbohydrates - Liquid supplement to support wound healing (includes dipeptides and L-citrulline; aims to enhance collagen synthesis and driving nitric oxide production to support the healing process).   Tube feeding instructions:  Pour 1-60 mL bottle of Expedite into a cup. Mix with 30 mL water, stir to combine/disperse. Syringe mixture and infuse through feeding tube slowly. Flush tube with 30 mL water before and after administration.     Future/Additional Recommendations:  Monitor FT placement, TF initiation/advancement/tolerance  Monitor GOC       New findings:   Pt now in ICU, intubated. Plan for radiology to place PPFT (not done bedside d/t gastric bypass anatomy) and start TF.   Diet: NPO  Intake/Tolerance:  Ongoing poor PO prior to intubation. Not on nutrition support d/t inability to place feeding tube (patient did not tolerate).    Weight:   Most Recent Weight: 72.4  "kg (159 lb 9.8 oz)  on 3/14/25 via Bed scale  Body mass index is 28.27 kg/m .  GI symptoms:   Last BM: 03/20/25  Has PRN bowel med(s).  Nutrition-relevant labs:  Phos low (2.1) ; K intermittently low  Nutrition-relevant medications:  thiamine protocol. Drips: epinephrine stopped; propofol stopped; norepi stopped; phenylephrine @ 0.8 mcg/kg/min  Respiratory:  intubated     ASSESSED NUTRITION NEEDS   Dosing Weight: 61.2 kg, based on admission wt     Estimated Energy Needs: 3199-4621 kcals/day (25 - 30 kcals/kg)  Justification: Maintenance, Vented, and Critical illness  Estimated Protein Needs:  grams protein/day (1.5 - 2 grams of pro/kg)  Justification: Hypercatabolism with critical illness, Repletion, and Wound healing  Estimated Fluid Needs: 0602-2179 mL/day (1 mL/kcal)  Justification: Maintenance    Implementation  See nutrition interventions above      RD to follow per protocol    Lesley Juan, MS, RDN, LD  4C MICU RD  Vocera - \"4C Clinical Dietitian\"  Weekend/Holiday RD - \"Weekend Clinical Dietitian\"    "

## 2025-03-21 NOTE — PROGRESS NOTES
Preliminary EEG report:    First hour of video EEG was reviewed.  No well-organized posterior dominant rhythm was observed.  Diffuse polymorphic low amplitude 2 to 5 Hz delta-theta activities were present.  There were also intermittent brief generalized attenuation of the background.  No epileptiform discharges or electrographic seizures were recorded.    Findings are consistent with moderately severe diffuse nonspecific encephalopathy.    Elizabeth Sevilla MD

## 2025-03-21 NOTE — PROGRESS NOTES
"  PALLIATIVE CARE PROGRESS NOTE  [facility]     Patient Name: Alis Hartman  Date of Admission: 3/4/2025   Today the patient was seen for: goals of care     Recommendations & Counseling     GOALS OF CARE:   Life-prolonging without limits  Care conference held 3/20 during which providers expressed concern that Aranza is dying and shared plan to transition to comfort-focused care. Joy (daughter) did not want mom to die in the hospital but was open to taking her home with hospice. Joy requested we continue with all life-prolonging care in the interim (including remaining Full Code). After care conference, Joy changed course and indicated they are not interested in hospice.  When Aranza acutely decompensated night of 3/20-21, family was notified and ICU team repeatedly discussed concerns that continued aggressive cares may cause more harm than benefit. Joy indicated she wanted Aranza to be intubated and, after first PEA arrest with ROSC, indicated she wanted her to remain Full Code. She then had a second PEA arrest with ROSC, remains Full Code.  Aranza's current condition is in the setting of progressive decline for months after cancer treatment (most recently 10/2024) including recurrent hospitalizations for UTIs, severe malnutrition, severe deconditioning, multiple pressure wounds and fluctuating mentation with suspected dementia. There have been many goals of care conversations with family who have not been ready to change goals of care. Family has consistently indicated desire for Aranza to remain Full Code.  Discussed case with MICU and Ethics. No family at bedside and no plans to revisit goals of care today. Palliative remains available to assist with further goals of care discussions as needed.    ADVANCE CARE PLANNING:  Previously completed HCD naming Aranza (daughter) as HCA, appears this was not dated and deemed \"invalid.\" Aranza confirmed during " "last admission that Joy is her surrogate decision maker per notes in chart. Joy has a brother Freedom and reportedly communicates with him regarding decisions for Aranza.  There is a POLST form on file indicating Full Code/full treatment. Consider revising prior to discharge vs with hospice team.  Code status: Full Code     MEDICAL MANAGEMENT:   Not currently managing symptoms (prior regimen held in ICU)     PSYCHOSOCIAL/SPIRITUAL:  Family: Daughter (Joy) and grandchildren, son (Hemanth), niece (Armida)  Joy mentions things being \"God's plan\", though specific [employer] needs/affiliations not addressed     Palliative Care will continue to follow.     Kamilah De La Torre PA-C  [facility], Palliative Care  Securely message with the Vocera Web Console (learn more here) or  Text page via [facility] Paging/Directory      Assessment          Alis Hartman is a 69 year old female with a past medical history of cervical cancer s/p radiation, seriuos adenocarcinoma s/p SNEHA/BSO and cystotomy w/ suprapubic catheter placement 07/2024 and 3 cycles of carbo/taxol (10/2024), hx ESBL urosepsis and bacteremia, RNY gastric bypass, afib on apixaban, HTN, CKD stage 3, and multiple recent admissions (most recently 2/5-2/25 with MADHU, deconditioning, and malnutrition), re-admitted 3/4 with concern for UTI and failure to thrive/general weakness. Palliative following for symptom management and goals of care.      Interval History:     Multidisciplinary collaboration:  Acutely decompensated overnight requiring BiPAP then intubation, subsequently had PEA arrest x2 with ROSC, now in ICU. Family updated throughout and chose to proceed with Full Code status and aggressive care.  Code stroke called, CTH with small lacunar infarcts, CTA head incomplete due to code during scan    Patient/family narrative  Aranza is intubated, laying in bed and unresponsive to nursing at bedside.     Physical Exam:   Temp:  [91.1  F (32.8  C)-96.3  F (35.7  C)] 95.2 "  F (35.1  C)  Pulse:  [] 81  Resp:  [0-20] 16  BP: ()/() 77/60  MAP:  [64 mmHg-149 mmHg] 83 mmHg  Arterial Line BP: ()/() 110/61  FiO2 (%):  [80 %-100 %] 80 %  SpO2:  [57 %-100 %] 100 %  159 lbs 9.81 oz    Gen: laying in bed under Razia Hugger and blankets, off sedation but unresponsive        Data Reviewed:     [facility] 3/21  IMPRESSION:  1.  Small age-indeterminate though possibly recent lacunar infarcts in each thalamus.  2.  No hemorrhage or mass effect.    Lactic acid 2.7 > 3.9    CMP  Recent Labs   Lab 03/21/25 0427 03/21/25  0249 03/21/25  0010 03/20/25  2150 03/19/25  0414 03/19/25  0238     --  147* 138   < > 139   POTASSIUM 3.3*  --  4.0 3.4   < > 3.6   CHLORIDE 112*  --  117* 113*   < > 111*   CO2 15*  --  24 16*   < > 20*   ANIONGAP 10  --  6* 9   < > 8   * 85 71 113*   < > 127*   BUN 15.1  --  10.6 16.6   < > 18.5   CR 0.90  --  0.76 0.88   < > 0.79   GFRESTIMATED 68  --  83 70   < > 80   SAMUEL 6.9*  --  6.1* 7.3*   < > 8.1*   MAG  --   --  1.6* 1.8  --  1.8   PHOS  --   --  2.1*  --   --  2.6   PROTTOTAL 3.1*  --  2.6* 3.1*   < > 3.6*   ALBUMIN 1.8*  --  1.5* 1.8*   < > 2.2*   BILITOTAL 0.8  --  0.4 0.4   < > 0.4   ALKPHOS 126  --  102 113   < > 115   AST 58*  --  38 30   < > 24   ALT 32  --  23 25   < > 21    < > = values in this interval not displayed.     CBC  Recent Labs   Lab 03/21/25  0427 03/21/25  0010   WBC 12.3* 8.9   RBC 2.59* 2.36*   HGB 8.2* 7.3*   HCT 24.4* 21.4*   MCV 94 91   MCH 31.7 30.9   MCHC 33.6 34.1   RDW 26.0* 25.9*   PLT 24* 10*     Medical Decision Making       MANAGEMENT DISCUSSED with the following over the past 24 hours: MICU, Ethics   NOTE(S)/MEDICAL RECORDS REVIEWED over the past 24 hours: MICU, Neuro  40 MINUTES SPENT BY ME on the date of service doing chart review, history, exam, documentation & further activities per the note.

## 2025-03-21 NOTE — CODE/RAPID RESPONSE
Rapid Response Team Note    Assessment   A rapid response was called on Alis BURR [place] due to hypotension. Patient noted to have SBPs in the 50-70s, though labile upon recheck with SBPs in the 120-170s. Per bedside RN, patient with acute change in mental status for past ~ 24 hours. Upon bedside arrival, SBPs 70s, HR 80s, and on 4L O2 via NC with O2 sats 96%. Patient does not respond to verbal stimuli, does withdraw and grimace to noxious stimuli. BP stabilized, then patient with increasing O2 requirements --> HFNC,then patient noted to have left lateral gaze deviation and bilateral UE tremor R>L; STROKE CODE called. BG wnl. Neurology at bedside and w/ c/f possible seizure activity vs CVA. Labs, CXR, EKG, and STAT CT head ordered. Discussed with ICU who assessed patient at bedside and have accepted patient in transfer.     Plan   -  VBG, CBC, CMP, Mg, Procal, CRP, troponin   - Zosyn for suspected aspiration PNA  - Transfer to ICU- appreciate excellent care  -  The Internal Medicine primary team was at bedside  -  Disposition: The patient will remain on the current unit. We will continue to monitor this patient closely.  -  Reassessment and plan follow-up will be performed by the primary team    Wanda Carlson PA-C  Rapid Response Team [facility]  Securely message with [employer]     Medical Decision Making       75 MINUTES SPENT BY ME on the date of service doing chart review, history, exam, documentation & further activities per the note.      Alis is critically ill with acute hypoxic respiratory failure. These features are alarming and indicate that the patient is at imminent risk of life-threatening organ failure. Acute deterioration is being managed by the following critical interventions: assisted ventilation    Total Critical Care time spent by me, excluding procedures, was 75 minutes.    Hospital Course   Brief Summary of events leading to rapid response:   RRT called for Hypotension    Physical Exam    Vital Signs: Temp: 96.3  F (35.7  C) Temp src: Axillary BP: (!) 163/134 Pulse: 75   Resp: 20 SpO2: 94 % O2 Device: None (Room air)    Weight: 159 lbs 9.81 oz      Exam:     Physical Exam   Constitutional:   chronically ill-appearing female lying in bed.   HEENT:   Head: Normocephalic and atraumatic.   Eyes: Conjunctivae are normal. Pupils are equal, round, and sluggish. Left lateral gaze deviation  Pharynx has no erythema or exudate, mucous membranes are moist  Neck:   No adenopathy, no bony tenderness  Cardiovascular: Regular rate and rhythm without murmurs or gallops  Pulmonary/Chest: scant rales bilaterally; shallow respirations, increased WOB on HFNC and BiPAP  GI: Soft with good bowel sounds.  Non-tender, non-distended, with no guarding, no rebound, no peritoneal signs.   Back:  No bony or CVA tenderness   Musculoskeletal:  No edema or clubbing   Skin: Skin is warm and dry. No rash noted.   Neurological: Does not respond to verbal stimuli. Grimace and withdrawal to noxious stimuli. Moves bilateral UE, does not move bilateral LE.   Psychiatric; Unable to assess

## 2025-03-21 NOTE — PROGRESS NOTES
ECPR Evaluation:    Code Blue was called for Alis Hartman.  I evaluated the patient at the bedside for potential ECPR in collaboration with the on-call ECPR staff and standard guidelines.  This patient is not eligible for ECPR due to the following concerns: metastatic cancer with plans to discharge home with hospice in the coming days.    The patient was discussed with Dr. Rodrigues.    Gustavo Montes MD  Cardiology Fellow     March 21, 2025

## 2025-03-21 NOTE — PROGRESS NOTES
While taking vitals at 2120 pt presented with BP 42/27, BP retake was 43/26. Rapid response called at 2122 and RRT and primary came to bedside. RRT continued until patient was transferred to  after giving beside RN report at 2320.    Home

## 2025-03-21 NOTE — PROGRESS NOTES
Respond post RRT for HFNC due to unable to check O2 Sat. Later team decided to take her CT scan and placed on BiPAP (see flowsheet). CT canceled and was awaiting to transfer to ICU possible intubation.     Joselyn Castillo, RT

## 2025-03-21 NOTE — PROCEDURES
[facility]    Arterial line placement    Date/Time: 3/21/2025 12:37 AM    Performed by: Ellen Monzon MD  Authorized by: Storm Salcedo MD      UNIVERSAL PROTOCOL   Site Marked: NA  Prior Images Obtained and Reviewed:  NA  Required items: Required blood products, implants, devices and special equipment available    Patient identity confirmed:  Hospital-assigned identification number  NA - No sedation, light sedation, or local anesthesia  Confirmation Checklist:  Procedure was appropriate and matched the consent or emergent situation  Universal Protocol: the Joint Commission Universal Protocol was followed    Preparation: Patient was prepped and draped in usual sterile fashion    Indication:  respiratory failure hemodynamic monitoring  Location:  Left femoral      SEDATION    Patient Sedated: No      PROCEDURE DETAILS      Seldinger technique: Seldinger technique used    Number of Attempts:  1  Post-procedure:  Line sutured and dressing applied      PROCEDURE    Patient Tolerance:  Patient tolerated the procedure well with no immediate complications  Length of time physician/provider present for 1:1 monitoring during sedation: 10      Ellen Monzon MD, MD on 3/21/2025 at 12:38 AM

## 2025-03-21 NOTE — CODE/RAPID RESPONSE
[facility] Code Summary Note    Alis Hartman  MRN# [id number]  March 21, 2025, 12:39 AM    Location of event: ICU / CCU  Presumed cause: Hypotension.      Time the code started: 2359   Event location: ICU / CCU   Immediate cause: Unknown   Outcome of code: Survived   Transferred to: No transfer   Notification of family: Done   Who notified family: Medicine Resident   Disposition of belongings: No transfer   Comments: Patient transferred to unit for AHRF and labile blood pressures in setting of end-stage cancer. Patient normotensive post-intubation, subsequently developed hypotension and then PEA. CPR initiated. Epinephrine given x2, bicarb given x1. ROSC obtained on 3rd pulse check. Emergent arterial line and central line placed. Family notified       CODE Recorded by Betsy Swain    CODE leader Ellen Monzon    ATTENDING PRESENT: Storm Monzon MD, MD on 3/21/2025 at 12:52 AM

## 2025-03-21 NOTE — PLAN OF CARE
ICU End of Shift Summary. See flowsheets for vital signs and detailed assessment.    Changes this shift: Hypothermic 94-95 via esophageal temp probe despite tino hugger in place.  Remains HD unstable on Phenyl @0.8mcg/kg/min. Open eyes to painful stimuli and with some nursing cares;Pupils are reactive w/ NPI 4.s. Neuro cruit consulted, vEEG in place, CTH w/o contrast, CTA head neck w/contrast and CT chest PE abd,pelvis w/contrast done (Please see Epic). Right PIV infiltrated w/contrast, PIV removed, arm elevated and monitoring pulses closely.Intermittently hypoglycemic one amp of D50 given once. Post pyloric FT placed and TF's started. Neuro status. Multiple weeping wounds inner thighs, sacrum/coccyx following Woc rec's.   Plan: MAP goal of 65 titrate phenyl as tolerated. See Ethnic's note from today. Continue to monitor and w/ goals of care.    Goal Outcome Evaluation: Tolerated CT's.    Overall Patient Progress: no changeOverall Patient Progress: no change    Outcome Evaluation: Hypothermic via esophageal proble and remains HS unstable

## 2025-03-21 NOTE — CODE/RAPID RESPONSE
03/20/25 2200   Call Information   Date of Call 03/20/25   Time of Call 2125   Name of person requesting the team Zayra Saez   Title of person requesting team RN   RRT Arrival time 2128   Reason for call   Type of RRT Adult   Primary reason for call Cardiovascular   Cardiovascular SBP less than 90   Was patient transferred from the ED, ICU, or PACU within last 24 hours prior to RRT call? No   SBAR   Situation Pt hypotensive < 50, w/ MAPs in 40-50's.   Background 69 year old woman admitted on 2/5/2025. 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  Hospital course complicated by MADHU, hypervolemia, acute on chronic pain, suspected dementia, and decreased oral intake with severe malnutrition. After care conference on 3/20, we are planning for discharge with home hospice if possible and will continue with current medical cares while admitted to maximize the patient's remaining time.   Notable History/Conditions Cancer;End-Stage disease;Organ failure   Assessment Pt obtunded, not reactive to stimulus, not following commands, withdraws from all extremeties. SR 80's, hypotensive w/ MAPs in 40-50's, on 5L NC. Reliable sats challenging to obtain. Lung sounds diminished in bases, w/ expiratory wheezing. Later on assessment, eyes deviated leftwards bilaterally 30 min into RRT --> Stroke code called.   Interventions Blood glucose;CXR;ECG;IV fluids;Labs;O2 per N/C or mask  (Stroke code called, keppra load given. Placed on BiPAP.)   Patient Outcome   Patient Outcome Transferred to;Code blue called  (Transferred to 4A, upon intubation - pt coded.)   RRT Team    Attending/Primary/Covering Physician [facility] [place] 8   Date Attending Physician notified 03/20/25   Time Attending Physician notified 2128   Physician(s) Wanda Carlson PA-C   Lead RN Gilbert ZAMAN

## 2025-03-21 NOTE — PROGRESS NOTES
69 year old woman admitted on 2/5/2025 with increased confusion, poor appetite and generalized weakness.  Hospital course complicated by MADHU, hypervolemia, acute on chronic pain, suspected dementia, and decreased oral intake with severe malnutrition.     PMH notable for cervical cancer s/p radiation, serous endometrial adenocarcinoma s/p SNEHA/BSO and cystotomy w/ suprapubic catheter placement (7/2024) as well as several cycles of carbo/taxel (10/2024), hx ESBL urosepsis and bacteremia, RNY gastric bypass, afib not on apixaban (stopped due to vaginal bleeding), HTN, CKD3    Earlier tonight, the patient was noted to have worse mental status.  She would not respond to verbal commands.  Stroke code was called.  At the same time, the patient was also becoming hypotensive and hypoxemic.  She was initiated on BiPAP.  Neurology assessed the patient and are concerned about seizure activity versus CVA.    At this point, the patient hemodynamic status, and respiratory status is very tenuous.  She is working hard despite being supported by BiPAP.  Given her advanced age, multiple comorbid conditions, and prolonged hospitalization, I would not recommend aggressive medical interventions.  This was relayed to her daughter to my resident.  However, the daughter indicated she would like her mother to get full medical support.    Will transfer the patient to the intensive care unit.  She will likely need to be intubated given his hypoxemic respiratory failure.  This will also facilitate a CT scan of her head and a CTA/CTP of her head as well.

## 2025-03-21 NOTE — CONSULTS
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 Hospital course complicated by MADHU, hypervolemia, acute on chronic pain, suspected dementia, decreased oral intake with severe malnutrition.      A care conference was held on 3/20 with plan for discharge with home hospice if possible but plan to continue ongoing medical cares with admitted.      A stroke code was called for left gaze deviation on 3/20/25.  Per report, a rapid response was called for hypotension. The patient had SBP in 50-70s. Her blood pressure was stabilized but then the patient had increasing O2 requirements requiring HFNC, she then was noted to have left lateral gaze deviation and bilateral UE tremor and a stroke code was called. Last known well is unclear, the patient has had ongoing altered mental status for the last few days, worsening on 3/19     On arrival, the patient had a blood pressure of 113/92, . She had left gaze deviation, not responsive to voice or noxious stimuli. Her right arm was flexed and had bilateral shaking in her upper extremities, it was not consistently rhythmic but at times appears to be more rhythmic. Not purposefully moving her extremities.      Due to abnormal movements and left gaze deviation, considered seizure vs CVA. Due to " "concern for seizure gave 3000mg (40mg/kg) load of keppra. Considered administration of benzodiazepine but due to concerns for her respiratory status, held off on administration.      Additionally, due to concern for respiratory decompensation, imaging was delayed until the patient was more medically stable, the patient was transferred to ICU, following intubation, she had hypotension and then had PEA arrest, CPR initiated, Epinephrine given x2, bicarb given x1, ROSC obtained on 3rd pulse check.      The patient was stabilized and once felt to be more stable, was taken to CT for imaging.      While obtaining CT, the patient lost pulse, CPR was initiated and code blue was called. 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 Hospital course complicated by MADHU, hypervolemia, acute on chronic pain, suspected dementia, decreased oral intake with severe malnutrition. A stroke code was called for left gaze deviation on 3/20/25, in the setting of significant respiratory decompensation.     The patient was transferred to ICU, intubated, had PEA arrest with ROSC. Once more medically stable, she was taken to CT for imaging, unfortunately while obtaining imaging, noted to be without pulses, suspected PEA arrest. CPR was initiated and code blue was called. ROSC was achieved.  Over the course of this process, the patient received a Keppra load and was started on maintenance Keppra.  While it is " somewhat unclear what exactly occurred neurologically during the patient's event on the evening of 3/20- 3/21, there is some possibility of seizure and it is reasonable to continue Keppra.      CT imaging was eventually obtained, showing no acute intracranial pathology and relatively intact vasculature.     Neuro  #Encephalopathy  #Left Gaze deviation, abnormal movements in bilateral UE  -Continue maintenance Keppra 750 mg bid   -Continue vEEG monitoring.   -HOB > 30   -PT/OT/SLP  -limit CNS acting medications including opioids, benzodiazepines, anticholinergics; consider utilizing local measures or scheduled pain regimens that minimize opioids for pain.  -treat underlying infections, correct metabolic derangements as able and provide supportive medical cares, including correction of any electrolyte abnormalities, consider checking TSH, ammonia, thiamine levels  -utilize delirium precautions including frequent reorientation, normal day night cycles; blinds up and awake during day and sleeping at night, minimize frequent interruptions, clutter, and loud noises. Encourage family visitations and therapeutic interactions. Lastly, consider melatonin at 1900 nightly  -avoid sensory deprivation (provide patient with eyeglasses or hearing aids if using)    Clinically Significant Risk Factors        # Hypokalemia: Lowest K = 3 mmol/L in last 2 days, will replace as needed  # Hypernatremia: Highest Na = 147 mmol/L in last 2 days, will monitor as appropriate  # Hyperchloremia: Highest Cl = 117 mmol/L in last 2 days, will monitor as appropriate      # Hypocalcemia: Lowest iCa = 4.1 mg/dL in last 2 days, will monitor and replace as appropriate   # Hypomagnesemia: Lowest Mg = 1.6 mg/dL in last 2 days, will replace as needed   # Hypoalbuminemia: Lowest albumin = 1.5 g/dL at 3/21/2025 12:10 AM, will monitor as appropriate  # Coagulation Defect: INR = 2.39 (Ref range: 0.85 - 1.15) and/or PTT = 128 Seconds (Ref range: 22 - 38  Seconds), will monitor for bleeding  # Thrombocytopenia: Lowest platelets = 10 in last 2 days, will monitor for bleeding   # Hypertension: Noted on problem list             # Severe Malnutrition: based on nutrition assessment and treatment provided per dietitian's recommendations.    # Financial/Environmental Concerns: none          The patient was seen and discussed with the [facility] attending, Dr. Montgomery.    Gustavo Salamanca MD  Neurology, PGY3    Allergies   Allergen Reactions    Ibuprofen Nausea and Vomiting    Shrimp     Sulfa Antibiotics Rash     Patient started on bactrim 7/24/24 tolerating medication without rash on 7/25        Current Medications:  Current Facility-Administered Medications   Medication Dose Route Frequency Provider Last Rate Last Admin    artificial saliva (BIOTENE MT) solution 1 spray  1 spray Mouth/Throat 4x Daily Salvador Dickerson MD   1 spray at 03/21/25 1640    buprenorphine (BUTRANS) 20 MCG/HR WK patch 1 patch  1 patch Transdermal Weekly Kamilah De La Torre PA-C   1 patch at 03/17/25 1757    buprenorphine (BUTRANS) Patch in Place   Transdermal Q8H Kamilah De La Torre PA-C        chlorhexidine (PERIDEX) 0.12 % solution 15 mL  15 mL Mouth/Throat Q12H Anaya Paredes MD   15 mL at 03/21/25 0843    levETIRAcetam (KEPPRA) 750 mg in sodium chloride 0.9 % 117.5 mL intermittent infusion  750 mg Intravenous BID Gustavo Salamanca  mL/hr at 03/21/25 1225 750 mg at 03/21/25 1225    lidocaine (viscous) (XYLOCAINE) 2 % solution 5 mL  5 mL Mouth/Throat Once Jonathan Shen MD        linezolid (ZYVOX) infusion 600 mg  600 mg Intravenous Q12H Jolanta Peralta MD   600 mg at 03/21/25 1643    multivitamin w/minerals (THERA-VIT-M) tablet 1 tablet  1 tablet Per Feeding Tube Daily Jonathan Shen MD   1 tablet at 03/21/25 1643    OLANZapine zydis (zyPREXA) ODT half-tab 2.5 mg  2.5 mg Oral BID Deepali Negrete MD   2.5 mg at 03/19/25 0921    pantoprazole (PROTONIX) 2 mg/mL suspension  40 mg  40 mg Per Feeding Tube QAM Anaya Price MD        Or    pantoprazole (PROTONIX) IV push injection 40 mg  40 mg Intravenous QAM Anaya Price MD   40 mg at 03/21/25 0847    piperacillin-tazobactam (ZOSYN) 4.5 g vial to attach to  mL bag  4.5 g Intravenous Q6H Storm Salcedo MD   4.5 g at 03/21/25 1416    sodium chloride (PF) 0.9% PF flush 10-20 mL  10-20 mL Intracatheter Q28 Days Deepali Negrete MD   10 mL at 03/13/25 1630    sodium chloride (PF) 0.9% PF flush 3 mL  3 mL Intracatheter Q8H Salvador Dickerson MD   3 mL at 03/21/25 1640    thiamine (B-1) injection 500 mg  500 mg Intravenous TID Anaya Paredes MD   500 mg at 03/21/25 1410    Followed by    [START ON 3/23/2025] thiamine (B-1) injection 250 mg  250 mg Intravenous Daily Anaya Paredes MD        Followed by    [START ON 3/28/2025] thiamine (B-1) tablet 100 mg  100 mg Oral Daily Anaya Paredes MD        wound support modular (EXPEDITE) bottle 60 mL  60 mL Oral Daily Jonathan Shen MD           PRN Medications:  Current Facility-Administered Medications   Medication Dose Route Frequency Provider Last Rate Last Admin    albuterol (PROVENTIL HFA/VENTOLIN HFA) inhaler  1-2 puff Inhalation Q4H PRN Salvador Dickerson MD        albuterol (PROVENTIL) neb solution 2.5 mg  2.5 mg Nebulization Q4H PRN Anaya Paredes MD        bisacodyl (DULCOLAX) suppository 10 mg  10 mg Rectal Daily PRN Salvador Dickerson MD        calcium carbonate (TUMS) chewable tablet 1,000 mg  1,000 mg Oral 4x Daily PRN Salvador Dickerson MD        dextrose 10% infusion   Intravenous Continuous PRN Jonathan Shen MD        dextrose 10% infusion   Intravenous Continuous PRN Deepali Negrete MD   Stopped at 03/16/25 1455    glucose gel 15-30 g  15-30 g Oral Q15 Min PRN Anaya Paredes MD        Or    dextrose 50 % injection 25-50 mL  25-50 mL Intravenous Q15 Min PRN Anaya Paredes MD   50 mL at 03/21/25 1619    Or     glucagon injection 1 mg  1 mg Subcutaneous Q15 Min PRN Anaya Paredes MD        [Held by provider] fentaNYL (SUBLIMAZE) 50 mcg/mL bolus from pump  25 mcg Intravenous Q1H PRN Anaya Paredes MD        Lidocaine (LIDOCARE) 4 % Patch 1 patch  1 patch Transdermal Daily PRN Kamilah De La Torre PA-C        lidocaine (LMX4) cream   Topical Q1H PRN Salvador Dickerson MD   Given at 03/08/25 0948    lidocaine 1 % 0.1-1 mL  0.1-1 mL Other Q1H PRN Salvador Dickerson MD        [Held by provider] propofol (DIPRIVAN) bolus from bag or syringe pump  10 mg Intravenous Q15 Min PRN Anaya Paredes MD        And    Medication Instruction   Does not apply Continuous PRN Anaya Paredes MD        miconazole (MICATIN) 2 % powder   Topical BID PRN Salvador Dickerson MD        naloxone (NARCAN) injection 0.2 mg  0.2 mg Intravenous Q2 Min PRN Salvador Dickerson MD        Or    naloxone (NARCAN) injection 0.4 mg  0.4 mg Intravenous Q2 Min PRN Salvador Dickerson MD        Or    naloxone (NARCAN) injection 0.2 mg  0.2 mg Intramuscular Q2 Min PRN Salvador Dickerson MD        Or    naloxone (NARCAN) injection 0.4 mg  0.4 mg Intramuscular Q2 Min PRN Salvador Dickerson MD        ondansetron (ZOFRAN ODT) ODT tab 4 mg  4 mg Oral Q6H PRN Salvador Dickerson MD        Or    ondansetron (ZOFRAN) injection 4 mg  4 mg Intravenous Q6H PRN Salvador Dickerson MD        senna-docusate (SENOKOT-S/PERICOLACE) 8.6-50 MG per tablet 1 tablet  1 tablet Oral BID PRN Salvador Dickerson MD        Or    senna-docusate (SENOKOT-S/PERICOLACE) 8.6-50 MG per tablet 2 tablet  2 tablet Oral BID PRN Salvador Dickerson MD        sodium chloride (PF) 0.9% PF flush 10-20 mL  10-20 mL Intracatheter q1 min prn Deepali Negrete MD        sodium chloride (PF) 0.9% PF flush 10-20 mL  10-20 mL Intracatheter Q1H PRN Deepali Negrete MD        sodium chloride (PF) 0.9% PF flush 3 mL  3 mL Intracatheter q1 min prn Salvador Dickerson MD           Infusions:  Current Facility-Administered  Medications   Medication Dose Route Frequency Provider Last Rate Last Admin    dextrose 10% and 0.45% NaCl infusion   Intravenous Continuous Jolanta Peralta  mL/hr at 03/20/25 1019 New Bag at 03/20/25 1019    dextrose 10% infusion   Intravenous Continuous PRN Jonathan Shen MD        dextrose 10% infusion   Intravenous Continuous PRN Deepali Negrete MD   Stopped at 03/16/25 1455    [Held by provider] fentaNYL (SUBLIMAZE) infusion   mcg/hr Intravenous Continuous Anaya Paredes MD   Stopped at 03/21/25 0057    [Held by provider] propofol (DIPRIVAN) infusion  5-75 mcg/kg/min Intravenous Continuous Anaya Paredes MD   Stopped at 03/21/25 0056    And    Medication Instruction   Does not apply Continuous PRN Anaya Paredes MD        norepinephrine (LEVOPHED) 16 mg in  mL infusion MAX CONC CENTRAL LINE  0.01-0.6 mcg/kg/min Intravenous Continuous Storm Salcedo MD   Stopped at 03/21/25 1034    phenylephrine (MICK-SYNEPHRINE) 50 mg in NaCl 0.9 % 250 mL infusion  0.1-6 mcg/kg/min (Dosing Weight) Intravenous Continuous Padmini Ernst APRN CNP 17.4 mL/hr at 03/21/25 1639 0.8 mcg/kg/min at 03/21/25 1639       Physical Examination:  Vitals: BP (!) 77/60   Pulse 68   Temp 96.8  F (36  C) (Oral)   Resp 18   Wt 72.4 kg (159 lb 9.8 oz)   SpO2 100%   BMI 28.27 kg/m    General: Adult female patient, lying in bed, critically-ill  HEENT: ETT in place  Cardiac: RRR on monitor   Pulm: Intubated  Abdomen: Non-distended  Extremities: Warm  Skin: No rash or lesion  Psych: Calm and cooperative  Neuro:  Mental Status: Does not follow commands but does grimace in response to noxious stimuli and opens eyes in response to noxious stimuli.  Cranial Nerves:  PERRL, gaze conjugate and midline, not blinking to threat bilaterally, face appears symmetric, intact corneals, cough, and gag   Motor:  Not moving all extremities purposefully   Reflexes:  no clonus  Sensory:   does not withdraw to noxious  stimuli in all extremities  Coordination:   IGOR  Station/Gait:  deferred    Labs and Imaging:  All relevant imaging and laboratory values personally reviewed.

## 2025-03-21 NOTE — PROGRESS NOTES
"S: Brandeea \"Aranza\" Washington is a 69 year old female inpatient who presented to the imaging department for a CT pulmonary angiogram and CT abdomen/pelvis.    During injection of contrast, 97 mL of Isovue-370 contrast extravasated into the patient s right antecubital fossa. The patient was evaluated by Dr. Escobar on 3/21/2025 at 1520.    O:  General: A&O, NAD  Lungs: Intubated.  CV: Extremities WWP. Difficult to assess peripheral pulses manually due to patient movement. Pulses were intact on pulse/ox monitor on the right index finger.   Skin: Swelling about the extravasation site. No abnormalities of the distal right upper extremity.  Neuro: Unable to assess sensation due to patient being intubated and sedated.    A/P:  Luwanna \"Aranza\" Washington is status-post extravasation of 97 ml of Isovue-370 contrast in the right antecubital fossa during injection for CT pulmonary angiogram and CT abdomen/pelvis. Limited evaluation of sensation due to lack of patient responsiveness (intubated and sedated). Difficult to manually assess pulses due to patient moving her arms, but pulses were intact on pulse/ox monitor on the right index finger. Extravasation was discussed with Padmini Ernst CNP on 3/21/2025 at 1530. Provider acknowledged understanding.     Keep the right upper extremity elevated. May alternate hot and cold compresses to the area of extravasation. Monitor for decreased/loss of pulses, skin changes, or decreased sensation in the right upper extremity.     Lizbeth Escobar, DO  Diagnostic Radiology PGY-3  "

## 2025-03-21 NOTE — PROGRESS NOTES
Medical ICU PROGRESS NOTE  03/21/2025      Date of Service (when I saw the patient): 03/21/2025    ASSESSMENT: Alis Hartman is a 69 year old female with PMH of cervical cancer s/p radiation, serous endometrial adenocarcinoma s/p SNEHA/BSO (hysterectomy/ovarian removal) and cystotomy w/ suprapubic catheter placement (7/2024) as well as several cycles of carbo/taxel (10/2024), hx ESBL urosepsis and bacteremia, RNY gastric bypass, afib not on apixaban (stopped due to vaginal bleeding), HTN, CKD3 who was initially admitted with increased confusion, poor appetite and generalized weakness.  Her Hospital course complicated by MADHU, hypervolemia, acute on chronic pain, suspected dementia, and decreased oral intake with severe malnutrition on 3/20 there was plan for discharge with home hospice with continuation of medical cares to maximize patient's remaining time.  Patient began requiring higher level of oxygen support that was unable to be met by BiPAP on the evening of 3/20 and a rapid was called and after extensive discussion with family patient was intubated for airway protection. Patient had PEA arrest x2 following intubation.     CHANGES and MAJOR THINGS TODAY:   - Plan to have IR place feeding tube, will consider giving platelets prior to intervention if needed. Currently unable to get a hold of family for consent  - RD consult for tube feeds  - POCUS showed outflow obstruction, BP and pressor needs improved with 1L fluid and 500 ml of 5% albumin  - Neuro Crit consult d/t bilateral tremors, patient not tracking, c/f stroke last night and unable to complete head CT imaging, having continuous tremors, c/f seizure? currently awaiting recs.   - Will order head imaging if Neuro Crit recommends, would then also order CT PE and CT abdomen/pelvis   - send sputum and urine culture, had blood cultures drawn overnight  - continue linezolid and zosyn for now, could consider ID consult      PLAN:    Neuro:  # Pain and  sedation  -IV propofol  -IV fentanyl  - RASS goal 0 to -1  - buprenophine patch on hold     # C/f CVA  # C/f seizures  # New left eye deviation  # small age indeterminate (possibly recent) lacunar infarcts in bilateral thalami  # bilateral tremors of upper extremities  Patient with code stroke called due to acute mental status change and new left eye gaze deviation along with bilateral upper extremity tremors R>L.  Patient was unable to initially receive head CT due to increasing oxygen requirements. She then had a PEA arrest. Did start some head imaging, however then coded a second time during the scan.  -CT head w/o: small age indeterminate (possibly recent) lacunar infarcts in bilateral thalami, no hemorrhage or mass effect  -CT head perfusion w/ contrast: not able to be completed  -CTA head and neck when stable: poor study, patient was coding during scan  -Received 1x Ativan 4 mg  -Received Keppra loading dose  -Neurology consulted with initial recs being:               - S/p keppra 3000mg IV load  - no further workup at this time pending GOC  - agree with ongoing GOC conversations with family  -3/21: per overnight discussions, it appears family is desiring full cares, have been unable to reach the daughter this morning. Neurocrit is consulted to assess for seizures, EEG, and to recommend if we should get any further head imaging.    # Hypothermia  Possible due to shock, post cardiac arrest. Could be related to other etiology such as sepsis.   - has bare hugger on  - cortisol and TSH added on       # Mixed etiology cognitive impairment: contributions from delirium, metabolic encephalopathy, dementia, schizophrenia   Patient initially presenting with confusion.  Has history of paranoia with suspected hallucinations.  Has also had a general decline with refusing to eat and poor nutrition for the past several days.  Patient's encephalopathy likely multifactorial given her poor oral intake, psychiatric history, and  dementia component.  Feeding tube attempted on 3/14, but unable to be placed due to disorientation and difficulty with laying flat.  -Start IV thiamine   -Continue D10 fluids for now, nutrition plan as below  - zyprexa ordered earlier this admission, has been being held, unclear if patient was refusing or unable to take. Currently is intubated.      # Acute on Chronic Pain  Thigh, low back pain related to her coccyx.  Patient was started on buprenorphine patch.  Palliative following.  -Hold buprenorphine patch  -will monitor for signs of pain, currently would be ideal to not sedate patient to allow for an accurate neurological exam        Pulmonary:  # Acute hypoxemia respiratory failure  Patient with increasing acute hypoxemia on BiPAP requiring intubation. Etiology remains unclear. Could consider PE as CXR was relatively unremarkable. Bedside POCUS not strongly convincing for right heart strain.   - CT PE study will be ordered later today  - vent bundle    FiO2 (%): 80 %, Resp: 16, Vent Mode: CMV/AC, Resp Rate (Set): 16 breaths/min, Tidal Volume (Set, mL): 420 mL, PEEP (cm H2O): 5 cmH2O, Resp Rate (Set): 16 breaths/min, Tidal Volume (Set, mL): 420 mL, PEEP (cm H2O): 5 cmH2O       Cardiovascular:  # Shock, likely multifactorial, functional outflow tract obstruction noted on bedside POCUS in setting of hypovolemia, possible septic component as well  Patient requiring pressor support.  Had received fluid boluses during rapid response.  Bedside POCUS on 3/21 showed functional outflow tract obstruction in setting of hypovolemia  - Continue abx as discussed below  - formal TTE on 3/21 (done after an additional 1L of LR and 500 ml albumin) showed global and regional left ventricular function is normal with an EF of 60-65%, thickening of anterobasal septal is present, left ventricular filling pressures are increased. Global right ventricular function is normal, Mild to moderate tricuspid insufficiency is present. The  right ventricular systolic pressure is 31mmHg above the right atrial pressure.  - Discontinued epinephrine, switched to phenylephrine, is still on levophed with plan to wean off levophed before phenylephrine.   - MAP goal >65        # PEA arrest x2  Patient with PEA arrest that occurred after intubation and once while in the CT scanner.  Patient achieved ROSC after several rounds of CPR. Suspect arrest was related to hypoxia and functional outflow tract obstruction.   -Continue GOC discussion as discussed below        # Elevated troponin   Elevated troponins, likely in the setting of demand ischemia. ECG   -Trend troponin to  peak   - no wall motion abnormalities noted on TTE report     # Hx of Afib   Rate controlled. Prior admission had risk/benefit discussion and discontinued AC due to vaginal bleeding.         GI/Nutrition:  # Severe Protein-Calorie Malnutrition   # Decreased oral intake/Refusal to eat   # Hypoglycemia  # Hx Kiko en y  # Hypoalbuminemia  Patient has been refusing to eat, has not had good nutrition for several days. Calorie counts 3/11 to 3/15 showed 0 intake.   - Continue D10, is now intubated, will plan to start tube feeds, have consulted IR to place feeding tube  - Nutrition consulted, appreciate assistance  - IV PPI      Renal/Fluids/Electrolytes:  #MADHU, oliguric, likely secondary to poor oral intake/malnutrition, renal labs currently stable  #Anasarca  #Dependent facial edema  # Hypokalemia  Creatinine at baseline 0.8-0.9. Resolved with 100 g of 25% albumin on 3/12 and 3/13. Renal US normal.  - Nephrology consulted this admission, signed off 3/16  - Lymphedema consulted, appreciate assistance  - Strict I's and O's, daily weights  - Monitor BMP     - RN managed electrolyte replacements     Endocrine:  # Hypoglycemia   Previously patient has been hypoglycemic likely due to poor oral intake  -D10 fluids as above      ID:  # Pyuria, VRE in urine culture   # Hx of ESBL urosepsis and bacteremia  #  Bladder dysfunction s/p cystostomy and suprapubic catheter  Recently admitted 11/26 - 12/8/2024 for ESBL urosepsis and bacteremia requiring ICU w/ left nephrostomy tube (removed 1/7) treated w/ ertapenem, returned 1/25-1/27 for concern for UTI but no clear infection at that time. Urology collected UA during SPC exchange on 3/17 due to turbid urine and this is now growing VRE.   - Suprapubic catheter exchanged by urology on 3/17  - Continue linezolid for VRE   - zosyn also started overnight   - Follow up final culture/susceptibilities  - 3/21: will send sputum and urine cultures, had blood cultures drawn overnight        Hematology:    #Acute on chronic anemia likely secondary to chronic disease  #Thrombocytopenia, HIT ruled out  #Leukopenia (currently resolved)  # Leukocyctosis  Did require pRBC transfusion on 3/13. No signs of bleeding. Platelets have now decreased significantly from ~150 to <50. There was concern for HIT, but screening panel returned negative. Now also with leukopenia. Likely this is related to overall clinical decline and malnutrition, but will investigate other causes.  - Holding lovenox/heparin  - Smear sent  - Transfuse as needed for Hgb <7, Plt <10k  - 3/21: may give platelets today if IR would prefer patient receives a transfusion prior to cortrak placement        Oncology:  # Serous endometrial carcinoma  Follows w/ heme/onc through [place]. S/p SNEHA, BSO 07/2024 s/p cycle 3 carbo/taxel 10/15/2024, cycle 4 delayed in s/o recent admission, family ultimately decided to forgo any further treatment. During a care conference this admission, it was expressed that her cancer is likely to recur due to it being an aggressive type but when it will recur is unknown. However, should it recur GynOnc team expressed that because of her functional status (significant weakness) and malnutrition, treatment would not be recommended since that would worsen her health overall and benefits would not outweigh the  risks.           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 CRP elevated, likely due to inflammation in the setting chronic wounds. Low concern for cellulitis.  - WOC consulted  - Monitor for now, if febrile will start antibiotics  - Urology recommends gauze or drain sponge around the site        Goals of care/ Social:   #Goals of care   #Recurrent hospitalizations with progressive step-wise decline  Per Chart Review, Care conference 3/20 with [facility], medicine, gyn onc. Patient's daughter and one of her cousins attended in person. The teams discussed that we are worried the patient is in the process of dying despite all of the treatment that she has been getting this admission. We recommended that we focus on keeping her comfortable for the time that she has remaining. Her daughter asked if this meant dying in the hospital and stated that she would want her mother at home. This led to a discussion about home hospice which the patient's daughter was agreeable to. She asked if we could continue the current therapies while the patient remains in the hospital which is reasonable to maximize time. We did broach the topic of code status and they were very clear that they want the patient to remain full code at this time.  Discussed with patient's daughter regarding her current decline and necessity of intubation given that she was failing BiPAP and requiring intubation.  Extensively discussed with daughter Joy that intubating the patient was high risk with a high likelihood that she may die during the procedure and that if we were to intubate it would be very unlikely that she would be able to be extubated successfully, which would be discordant with with  "the [facility] conference that was held on 3/20 regarding transfer to hospice.  Discussed with daughter about discussions had at goals of care conference and asked if this would be within patient's goals..  Daughter stated that she \"wanted everything that could be done\" to be done clarifying with patient she stated that she would want her mother to be intubated.  Unfortunately patient experienced PEA arrest shortly after intubation and again after being sent down to the CT scanner.  Called family and discussed with them both times.  Explained to daughter that patient has nonreversible factors that are leading to her heart stopping.  Discussed that by continuing to keep her CODE STATUS as full code that we may in fact be doing more harm to the patient than providing meaningful benefit for her at this time during this course of her care.  Discussed with other family members present in the family waiting room.  Daughter states that at this time she would like to continue all interventions and to continue her mother's full CODE STATUS.  -Ethics consult in place           General Cares/Prophylaxis:    DVT Prophylaxis: Defer to primary service  GI Prophylaxis: PPI  Restraints: none  Family Communication: attempted to call patients daughter x2, no answer  Code Status: FULL code as discussed above     Lines/tubes/drains:  - Port  - Shahid  - Art line  - CVC triple lumen   - ET tube in place      Disposition:  - Medical ICU      Patient seen and findings/plan discussed with medical ICU staff, Dr. Cooepr.  Critical care time spent 45 minutes    ERINN Baires CNP    Clinically Significant Risk Factors        # Hypokalemia: Lowest K = 3 mmol/L in last 2 days, will replace as needed  # Hypernatremia: Highest Na = 147 mmol/L in last 2 days, will monitor as appropriate  # Hyperchloremia: Highest Cl = 117 mmol/L in last 2 days, will monitor as appropriate      # Hypocalcemia: Lowest iCa = 4.1 mg/dL in last 2 days, will monitor and " replace as appropriate   # Hypomagnesemia: Lowest Mg = 1.6 mg/dL in last 2 days, will replace as needed   # Hypoalbuminemia: Lowest albumin = 1.5 g/dL at 3/21/2025 12:10 AM, will monitor as appropriate  # Coagulation Defect: INR = 2.39 (Ref range: 0.85 - 1.15) and/or PTT = 128 Seconds (Ref range: 22 - 38 Seconds), will monitor for bleeding  # Thrombocytopenia: Lowest platelets = 10 in last 2 days, will monitor for bleeding   # Hypertension: Noted on problem list             # Severe Malnutrition: based on nutrition assessment and treatment provided per dietitian's recommendations.    # Financial/Environmental Concerns: none              ====================================  INTERVAL HISTORY:   Patient intubated overnight, had 2 PEA arrests. Received fluids this morning with subsequent improvement of blood pressure and pressor needs.      OBJECTIVE:   1. VITAL SIGNS:   Temp:  [91.1  F (32.8  C)-96.3  F (35.7  C)] 95.2  F (35.1  C)  Pulse:  [] 98  Resp:  [16-20] 16  BP: ()/() 77/60  MAP:  [64 mmHg-149 mmHg] 66 mmHg  Arterial Line BP: ()/() 86/58  FiO2 (%):  [80 %-100 %] 80 %  SpO2:  [57 %-100 %] 95 %  FiO2 (%): 80 %, Resp: 16, Vent Mode: CMV/AC, Resp Rate (Set): 16 breaths/min, Tidal Volume (Set, mL): 420 mL, PEEP (cm H2O): 5 cmH2O, Resp Rate (Set): 16 breaths/min, Tidal Volume (Set, mL): 420 mL, PEEP (cm H2O): 5 cmH2O  2. INTAKE/ OUTPUT:   I/O last 3 completed shifts:  In: 3367.5 [I.V.:1367.5; IV Piggyback:2000]  Out: 110 [Urine:110]    3. PHYSICAL EXAMINATION:    GEN: intubated, NAD  EYES: pupils small but round and reactive to light  HEENT:  Normocephalic, atraumatic, trachea midline, ETT secure  CV: RRR  PULM/CHEST: Clear breath sounds bilaterally, on vent  GI: abdomen semi firm, distended, bowel sounds hypoactive  : no urine to assess at this time, groin/thighs excoriated  EXTREMITIES: leg wraps in place  NEURO: bilateral hand tremors, no tracking, does not follow commands  SKIN:  groin excoriation, leg wounds, leg wraps in place. See media tab  PSYCH:  IGOR       4. LABS:   Arterial Blood Gases   Recent Labs   Lab 03/21/25 0427 03/21/25  0145 03/21/25  0045   PH 7.44 7.43 7.48*   PCO2 24* 27* 23*   PO2 275* 348* 414*   HCO3 16* 18* 17*     Complete Blood Count   Recent Labs   Lab 03/21/25 0427 03/21/25  0010 03/20/25 2150 03/20/25  0908   WBC 12.3* 8.9 5.2 3.4*   HGB 8.2* 7.3* 7.7* 7.8*   PLT 24* 10* 23* 14*     Basic Metabolic Panel  Recent Labs   Lab 03/21/25 0427 03/21/25  0249 03/21/25  0010 03/20/25 2150 03/20/25  0924 03/20/25  0908     --  147* 138  --  140   POTASSIUM 3.3*  --  4.0 3.4  --  3.0*   CHLORIDE 112*  --  117* 113*  --  115*   CO2 15*  --  24 16*  --  18*   BUN 15.1  --  10.6 16.6  --  17.8   CR 0.90  --  0.76 0.88  --  0.69   * 85 71 113*   < > 121*    < > = values in this interval not displayed.     Liver Function Tests  Recent Labs   Lab 03/21/25 0427 03/21/25  0010 03/20/25 2150 03/20/25  0908   AST 58* 38 30 36   ALT 32 23 25 27   ALKPHOS 126 102 113 109   BILITOTAL 0.8 0.4 0.4 0.3   ALBUMIN 1.8* 1.5* 1.8* 1.9*   INR  --  2.39* 1.61*  --      Coagulation Profile  Recent Labs   Lab 03/21/25 0010 03/20/25 2150   INR 2.39* 1.61*   * 51*       5. RADIOLOGY:   Recent Results (from the past 24 hours)   XR Chest Port 1 View    Impression    RESIDENT PRELIMINARY INTERPRETATION  IMPRESSION:   Moderate right, and small left pleural effusions with adjacent  consolidative opacities, as seen on CT 3/17/2025. Differential  diagnosis for consolidations continues to include relaxation  atelectasis, pulmonary edema and infection.   XR Chest Port 1 View    Impression    RESIDENT PRELIMINARY INTERPRETATION  Impression: Endotracheal tube tip terminates 3.0 cm from the sathya.  Otherwise no appreciable change from 2 hours prior given differences  in technique.   CT Head w/o Contrast    Narrative    EXAM: CT HEAD W/O CONTRAST  LOCATION: [facility]  [facility]  DATE: 3/21/2025    INDICATION: Change in mentation, severe thrombocytopenia.  COMPARISON: None.  TECHNIQUE: Routine CT Head without IV contrast. Multiplanar reformats. Dose reduction techniques were used.    FINDINGS:  INTRACRANIAL CONTENTS: No intracranial hemorrhage, extraaxial collection, or mass effect. Small age-indeterminate though possibly recent lacunar infarcts in each thalamus, slightly larger on the right. Mild volume loss and presumed chronic small vessel   ischemia.    VISUALIZED ORBITS/SINUSES/MASTOIDS: No intraorbital abnormality. No paranasal sinus mucosal disease. No middle ear or mastoid effusion.    BONES/SOFT TISSUES: No acute abnormality.      Impression    IMPRESSION:  1.  Small age-indeterminate though possibly recent lacunar infarcts in each thalamus.    2.  No hemorrhage or mass effect.    CT head findings were discussed with Dr. Wise at 0153 hours on 3/21/2025.   CTA Head Neck w Contrast    Narrative    EXAM: CTA HEAD NECK W CONTRAST  LOCATION: [facility]  DATE: 03/21/2025    INDICATION: Code Stroke, evaluate for LVO. PLEASE READ IMMEDIATELY  COMPARISON: None.  CONTRAST: 67 mL Isovue 370.  TECHNIQUE: Head and neck CT angiogram with IV contrast helical CT images of the head and neck vessels obtained during the arterial phase of intravenous contrast administration. Axial 2D reconstructed images and multiplanar 3D MIP reconstructed images of   the head and neck vessels were performed by the technologist. Dose reduction techniques were used. All stenosis measurements made according to NASCET criteria unless otherwise specified.    HEAD CT:  FINDINGS: The examination is essentially nondiagnostic. Contrast is seen within the left carotid artery extending intracranially. This artery appears grossly patent. 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   The remainder  of the intracranial and neck vessels are not well opacified. Large pleural effusions.      Impression    IMPRESSION:   1.  Essentially nondiagnostic study apart from patency of the left carotid artery. The remainder of the neck and intracranial vessels are not well opacified due to technical issues and the patient coding on the table.    2.  Large bilateral pleural effusions.    Findings were relayed to Dr. Wise at 0153 on 03/21/2025.

## 2025-03-21 NOTE — PROGRESS NOTES
Brief Medicine Cross-Cover Note:    RRT called for sudden hypotension, and this writer responded with RRT team. On arrival SBP 50-60 range in trendelenburg on multiple checks. Respirations labored and shallow. Bedside RN reports patient's level of responsiveness has declined throughout the day since this morning, more so since this afternoon. Patient does not respond to verbal stimuli, does withdraw and grimace to noxious stimuli. Reportedly yesterday she was alert and oriented x 2 and a bit more interactive.    Immediate interventions included LR bolus though she only received ~150cc and then IVF stopped when SBP improved to 120-130 range given tenuous respiratory status. She had been running maintenance fluids D10 1/2NS at 100cc/hr prior to RRT being called. 4L O2 applied due to work of breathing and initially satting 96%. Pulse oximetry waveform poor, difficult to get accurate reading after initial one. VBG w/ pH 7.43, CO2 29, PO2 37. High flow nasal cannula applied and patient ultimately placed on BIPAP for work of breathing per RRT for respiratory stabilization. Labs obtained: VBG (ABG deferred given severe thrombocytopenia), lactate, CBC, CMP, ionize ca, mag, troponin. EKG ordered. CXR ordered (prelim moderate right, and small left pleural effusions with adjacent consolidative opacities). CT Head wo ordered due to noted severe thrombocytopenia and mental status decline.    Called daughter Joy to give an update on patient's status, and she verified that she would like her mom to be full code and full restorative cares. We discussed likely probability of needing ICU level care, possibly intubation for respiratory stabilization to which she reported understanding. Joy also reported that there may have been an aspiration event earlier in the day on some applesauce.    As RRT progressed, patient was noted to have left eye gaze deviation and progressive tremors that were new (evolved during RRT, not present  at 9:30pm when rapid was called). Code stroke called and neurology team indicated high clinical suspicion for seizure, possible CVA. Benzodiazepine considered but very likely she would become obtunded with this and not be able to protect her airway. Given VBG w/ pH normal and lactate normal, decision was made to hold benzodiazepines for now, load patient with Keppra, and transfer emergently to ICU.    [facility] team notified and assessed patient at bedside promptly. Appreciate their assistance. They have graciously agreed to accept the patient to their service. Once patient is stabilized, neurology is planning for CT Head wo, CTA Head/Neck, CT perfusion. EEG also being considered.    Daughter Joy was updated by accepting ICU team and again by this writer to review clinical course. Patient remains full restorative cares, full code.    Anatoly Pond PA-C on 3/20/2025 at 11:44 PM

## 2025-03-21 NOTE — PLAN OF CARE
Transferred to ICU approximately 2300 for urgent intubation. First PEA arrest immediately after. After stabilization, patient was taken to CT to complete stroke code. Second PEA arrest in CT. Imaging unable to be completed.      Patient has no response to stimuli and is on no sedation.     HR is usually between . Occasional bradycardia episodes down into the 40's/50's. These are momentary and blood pressure does not seem affected. Currently on Levo and Epi to maintain maps above 65. 2 L LR bolus given overnight.      Lung sounds coarse, minimal secretions. Sats . Currently fi02 80%. , Peep 5, RR 16.     No bowel movement, active bowel sounds. Suprapubic catheter, 75 mL urine out since midnight.     Patient has multiple skin tears and ulcerations.     Lines: Left Femoral A line/ CVCx3             Right forearm PIV             Port accessed R chest   Suprapubic    Goal Outcome Evaluation:      Plan of Care Reviewed With: patient, family    Overall Patient Progress: decliningOverall Patient Progress: declining    Outcome Evaluation: 2 PEA arrests, intubation. Levo+Epi

## 2025-03-21 NOTE — CONSULTS
[facility]     Stroke Code Note          History of Present Illness     Chief Complaint: Rule out Urinary Tract Infection      Alis Hartman is a 69 year old female who ***    Delay in imaging due to concern for respiratory decompensation and          Past Medical History     Stroke risk factors: ***    Preadmission antithrombotic regimen: ***    {Pre-Morbid Modified [place]:728583}                   Assessment and Plan       1.  ***     Intravenous Thrombolysis  {given/not given/delayed:423938}     Endovascular Treatment  {initiated/not initiated:387715}     Plan:  {consult recommendations:941280}     ___________________________________________________________________    {Signature with AMCOM link:104431}  ___________________________________________________________________        Imaging/Labs   (personally reviewed ***)    CT head: ***  CTA head/neck: ***  CT Perfusion head: ***         Physical Examination     BP: (!) 137/122   Pulse: 77   Resp: 20   Temp: 96.3  F (35.7  C)   Temp src: Axillary   SpO2: 99 %   O2 Device: None (Room air)   Weight: 72.4 kg (159 lb 9.8 oz)    Wt Readings from Last 2 Encounters:   03/14/25 72.4 kg (159 lb 9.8 oz)   02/20/25 60.4 kg (133 lb 2.5 oz)       {Choice of physical and neuro exams:190694}        Stroke Scales     {NIHSS, Tavarez&Medina, ICH:603383}         Labs     CBC  Lab Results   Component Value Date    HGB 7.7 (L) 03/20/2025    HCT 23.5 (L) 03/20/2025    WBC 5.2 03/20/2025    PLT 23 (LL) 03/20/2025       BMP  Lab Results   Component Value Date     03/20/2025    POTASSIUM 3.0 (L) 03/20/2025    CHLORIDE 115 (H) 03/20/2025    CO2 18 (L) 03/20/2025    BUN 17.8 03/20/2025    CR 0.69 03/20/2025     (H) 03/20/2025    SAMUEL 7.4 (L) 03/20/2025       INR  INR   Date Value Ref Range Status   03/20/2025 1.61 (H) 0.85 - 1.15 Final   02/05/2025 1.24 (H) 0.85 - 1.15 Final   12/12/2024 1.33 (H) 0.85 - 1.15 Final       Data    {Stroke Code Response Times - NEED TO ANSWER, DO NOT ERASE:750371}     Clinically Significant Risk Factors   { TIP  Diagnoses will continue to appear throughout the admission.  :71480}     # Hypokalemia: Lowest K = 3 mmol/L in last 2 days, will replace as needed   # Hyperchloremia: Highest Cl = 115 mmol/L in last 2 days, will monitor as appropriate          # Hypoalbuminemia: Lowest albumin = 1.9 g/dL at 3/20/2025  9:08 AM, will monitor as appropriate  # Coagulation Defect: INR = 1.61 (Ref range: 0.85 - 1.15) and/or PTT = 51 Seconds (Ref range: 22 - 38 Seconds), will monitor for bleeding  # Thrombocytopenia: Lowest platelets = 11 in last 2 days, will monitor for bleeding   # Hypertension: Noted on problem list             # Severe Malnutrition: based on nutrition assessment and treatment provided per dietitian's recommendations.    # Financial/Environmental Concerns: none          Time Spent on this Encounter   Billing: {strokecritcare (stroke CODE or ICU consult/rounding):928524}    rapid response was called for hypotension. The patient had SBP in 50-70s. Her blood pressure was stabilized but then the patient had increasing O2 requirements requiring HFNC, she then was noted to have left lateral gaze deviation and bilateral UE tremor and a stroke code was called. Last known well is unclear, the patient has had ongoing altered mental status for the last few days, worsening on 3/19    On arrival, the patient had a blood pressure of 113/92, . She had left gaze deviation, not responsive to voice or noxious stimuli. Her right arm was flexed and had bilateral shaking in her upper extremities, it was not consistently rhythmic but at times appears to be more rhythmic. Not purposefully moving her extremities.     Decision was made to delay CT imaging due to concern for respiratory decompensation. We did recommend treatment of possible seizure with Keppra load 40mg/kg (about 3000mg), we also considered lorazepam but this was deferred in setting of concern for respiratory decompensation during the initial assessment of the stroke code.     The patient was transferred to ICU, intubated, had PEA arrest with ROSC. Once more medically stable, she was taken to CT for imaging, unfortunately while obtaining imaging, noted to be without pulses, suspected PEA arrest. CPR was initiated and code blue was called. ROSC was achieved.     CT imaging was limited, head CT was obtained with small age-indeterminate though possible recent lacunar infarcts in bilateral thalami. Given medical instability, would not recommend further workup acutely, pending goals of care conversations with family.      Intravenous Thrombolysis  Not given due to:   - unclear or unfavorable risk-benefit profile for extended window thrombolysis beyond the conventional 4.5 hour time window     Endovascular Treatment  Not initiated, unable to obtain diagnostic CTA H/N due to medical instability     Plan:  - S/p keppra 3000mg IV load  - no  further workup at this time pending GOC  - agree with ongoing GO conversations with family     ___________________________________________________________________    The patient was discussed with stroke neurology attending, Dr. Del Valle.     BETTY MAYA MD  Neurology Resident  Vocera: stroke first call resident  ___________________________________________________________________        Imaging/Labs   (personally reviewed)    CT head:   IMPRESSION:  1.  Small age-indeterminate though possibly recent lacunar infarcts in each thalamus.     2.  No hemorrhage or mass effect.    CTA head/neck:   IMPRESSION:   1.  Essentially nondiagnostic study apart from patency of the left carotid artery. The remainder of the neck and intracranial vessels are not well opacified due to technical issues and the patient coding on the table.     2.  Large bilateral pleural effusions.    CT Perfusion head: not able to obtain         Physical Examination     BP: (!) 137/122   Pulse: 77   Resp: 20   Temp: 96.3  F (35.7  C)   Temp src: Axillary   SpO2: 99 %   O2 Device: None (Room air)   Weight: 72.4 kg (159 lb 9.8 oz)    Wt Readings from Last 2 Encounters:   03/14/25 72.4 kg (159 lb 9.8 oz)   02/20/25 60.4 kg (133 lb 2.5 oz)       General Exam  General:  patient lying in bed, appears in respiratory distress  HEENT:  normocephalic/atraumatic  Cardio:  RRR  Pulmonary:   tachypnea; on high flow  Abdomen:  non-distended  Extremities:  no edema  Skin:  intact, warm/dry     Neuro Exam  Mental Status:  Obtunded, Not responsive to noxious stimuli, not following commands, not speaking  Cranial Nerves:  PERRL, left gaze deviation that is not able to be overcome, not blinking to threat bilaterally, face appears symmetric, unable to assess for dysarthria  Motor:  increased tone in bilateral UE, right arm flexed with abnormal movements in bilateral UE, appeared to be myoclonic like movements, not moving all extremities purposefully or  antigravity  Reflexes:  no clonus  Sensory:   does not withdraw to noxious stimuli in all extremities  Coordination:   IGOR  Station/Gait:  deferred        Stroke Scales     NIHSS  1a. Level of Consciousness 3-->Responds only with reflex motor or autonomic effects or totally unresponsive, flaccid, and areflexic   1b. LOC Questions 2-->Answers neither question correctly   1c. LOC Commands 2-->Performs neither task correctly   2.   Best Gaze 2-->Forced deviation, or total gaze paresis not overcome by the oculocephalic maneuver   3.   Visual 0-->No visual loss   4.   Facial Palsy 0-->Normal symmetrical movements   5a. Motor Arm, Left 3-->No effort against gravity, limb falls   5b. Motor Arm, Right 3-->No effort against gravity, limb falls   6a. Motor Leg, Left 3-->No effort against gravity, leg falls to bed immediately   6b. Motor Leg, right 3-->No effort against gravity, leg falls to bed immediately   7.   Limb Ataxia 0-->Absent   8.   Sensory 0-->Normal, no sensory loss   9.   Best Language 3-->Mute, global aphasia, no usable speech or auditory comprehension   10. Dysarthria 2-->Severe dysarthria, patients speech is so slurred as to be unintelligible in the absence of or out of proportion to any dysphasia, or is mute/anarthric   11. Extinction and Inattention  0-->No abnormality   Total 26 (03/20/25 2236)         Labs     CBC  Lab Results   Component Value Date    HGB 7.7 (L) 03/20/2025    HCT 23.5 (L) 03/20/2025    WBC 5.2 03/20/2025    PLT 23 (LL) 03/20/2025       BMP  Lab Results   Component Value Date     03/20/2025    POTASSIUM 3.0 (L) 03/20/2025    CHLORIDE 115 (H) 03/20/2025    CO2 18 (L) 03/20/2025    BUN 17.8 03/20/2025    CR 0.69 03/20/2025     (H) 03/20/2025    SAMUEL 7.4 (L) 03/20/2025       INR  INR   Date Value Ref Range Status   03/20/2025 1.61 (H) 0.85 - 1.15 Final   02/05/2025 1.24 (H) 0.85 - 1.15 Final   12/12/2024 1.33 (H) 0.85 - 1.15 Final       Data   Stroke Code Data  (for stroke code  without tele)  Stroke code activated 03/20/25 2208   First stroke provider response 03/21/25  0231   Last known normal        Time of discovery (or onset of symptoms)        Head CT read by Stroke Neuro Provider 03/21/25  0115   Was stroke code de-escalated? Yes  03/21/25  0201        Clinically Significant Risk Factors        # Hypokalemia: Lowest K = 3 mmol/L in last 2 days, will replace as needed   # Hyperchloremia: Highest Cl = 115 mmol/L in last 2 days, will monitor as appropriate          # Hypoalbuminemia: Lowest albumin = 1.9 g/dL at 3/20/2025  9:08 AM, will monitor as appropriate  # Coagulation Defect: INR = 1.61 (Ref range: 0.85 - 1.15) and/or PTT = 51 Seconds (Ref range: 22 - 38 Seconds), will monitor for bleeding  # Thrombocytopenia: Lowest platelets = 11 in last 2 days, will monitor for bleeding   # Hypertension: Noted on problem list             # Severe Malnutrition: based on nutrition assessment and treatment provided per dietitian's recommendations.    # Financial/Environmental Concerns: none          Time Spent on this Encounter

## 2025-03-21 NOTE — H&P
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  Her Hospital course complicated by MADHU, hypervolemia, acute on chronic pain, suspected dementia, and decreased oral intake with severe malnutrition on 3/20 there was plan for discharge with home hospice with continuation of medical cares to maximize patient's remaining time.  Patient began requiring higher level of oxygen support that was unable to be met by BiPAP on the evening of 3/21 a rapid was called and after extensive discussion with family patient was intubated for airway protection.      PLAN:    Neuro:  # Pain and sedation  -IV propofol  -IV fentanyl  - RASS goal 0 to -1    # C/f CVA  # C/f seizures  # New left eye deviation  Patient with code stroke called due to acute mental status change and new left eye gaze deviation along with bilateral upper extremity tremors R>L.  Patient was unable to initially receive head CT due to increasing oxygen requirements.  -CT head w/o when stable  -CT head perfusion w/ contrast when stable  -CTA head and neck when stable  -Received 1x Ativan 4 mg  -Received Keppra loading dose  -Neurology consulted appreciating recs;   - S/p keppra 3000mg IV load  - no further workup at this time pending GOC  - agree with ongoing GOC conversations with family          # Severe  Protein-Calorie Malnutrition   # Decreased oral intake/Refusal to eat   # Mixed etiology cognitive impairment: contributions from delirium, metabolic encephalopathy, dementia, schizophrenia   Patient initially presenting with confusion.  Has history of paranoia with suspected hallucinations.  Has also had a general decline with refusing to eat and poor nutrition for the past several days.  Patient's encephalopathy likely multifactorial given her poor oral intake, psychiatric history, and dementia component.  Feeding tube attempted on 3/14, but unable to be placed due to disorientation and difficulty with laying flat.  -Start IV thiamine   -Continue D10 fluids  -Nutrition consulted appreciating recs;    # Acute on Chronic Pain  Thigh, low back pain related to her coccyx.  Patient was started on buprenorphine patch.  Palliative following.  -IV fentanyl discussed above  -Hold buprenorphine patch      Pulmonary:  # Acute hypoxemia respiratory failure  Patient with increasing acute hypoxemia on BiPAP requiring intubation. Etiology remains unclear.       Cardiovascular:  # Shock  Patient requiring pressor support.  Had received fluid boluses during rapid response.  Unclear what is driving her current presentation of shock certainly could be 2/2 sepsis given concern for urinary tract infection vs possible obstructive given patients hx of afib and cancer and that patient has not been able to tolerate anticoagulation due to vaginal bleeding.    - Continue abx as discussed below  - Await urine culture results  - CT PE pending stability       # PEA arrest x2  Patient with PEA arrest that occurred after intubation and once while in the CT scanner.  Patient achieved ROSC after several rounds of CPR. No clear etiology regarding PEA arrest at this time.  -Continue GOC discussion as discussed below      # Elevated troponin   Elevated troponins, likely in the setting of demand ischemia. ECG   -Trend troponin to  peak   -pending  repeat ECG    # Hx of Afib   Rate controlled. Prior admission had risk/benefit discussion and discontinued AC due to vaginal bleeding.       GI/Nutrition:  # Severe Protein-Calorie Malnutrition   # Decreased oral intake/Refusal to eat   # Hypoglycemia  Patient has been refusing to eat, has not had good nutrition for several days. Calorie counts 3/11 to 3/15 showed 0 intake.   - Continue D10, would continue until discharge per care conference on 3/20  - Nutrition consulted, appreciate assistance  - IV PPI     Renal/Fluids/Electrolytes:  #MADHU, oliguric, likely secondary to poor oral intake/malnutrition (now resolved)  #Anasarca  #Dependent facial edema  Creatinine at baseline 0.8-0.9. Resolved with 100 g of 25% albumin on 3/12 and 3/13. Renal US normal.  - Nephrology consulted this admission, signed off 3/16  - Lymphedema consulted, appreciate assistance  - Strict I's and O's, daily weights  - Monitor BMP       Endocrine:  # Hypoglycemia   As discussed previously patient has been hypoglycemic likely due to poor oral intake  -D10 fluids as above     ID:  # Pyuria, VRE in urine culture   # Hx of ESBL urosepsis and bacteremia  # Bladder dysfunction s/p cystostomy and suprapubic catheter  Recently admitted 11/26 - 12/8/2024 for ESBL urosepsis and bacteremia requiring ICU w/ left nephrostomy tube (removed 1/7) treated w/ ertapenem, returned 1/25-1/27 for concern for UTI but no clear infection at that time. Urology collected UA during SPC exchange on 3/17 due to turbid urine and this is now growing VRE.   - Suprapubic catheter exchanged by urology on 3/17  - Continue linezolid for VRE   - Follow up final culture/susceptibilities       Hematology:    #Acute on chronic anemia likely secondary to chronic disease  #Thrombocytopenia, HIT ruled out  #Leukopenia  Did require pRBC transfusion on 3/13. No signs of bleeding. Platelets have now decreased significantly from ~150 to <50. There was concern for HIT, but screening panel  returned negative. Now also with leukopenia. Likely this is related to overall clinical decline and malnutrition, but will investigate other causes.  - Holding lovenox/heparin  - Smear sent  - Transfuse as needed for Hgb <7, Plt <10k        Oncology:  Serous endometrial carcinoma  Follows w/ heme/onc through [facility]. S/p SNEHA, BSO 07/2024 s/p cycle 3 carbo/taxel 10/15/2024, cycle 4 delayed in s/o recent admission, family ultimately decided to forgo any further treatment. During a care conference this admission, it was expressed that her cancer is likely to recur due to it being an aggressive type but when it will recur is unknown. However, should it recur GynOnc team expressed that because of her functional status (significant weakness) and malnutrition, treatment would not be recommended since that would worsen her health overall and benefits would not outweigh the risks.        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 CRP elevated, likely due to inflammation in the setting chronic wounds. Trended down without antibiotics.  No leukocytosis, afebrile, hemodynamically stable.  Low concern for cellulitis.  - WOC consulted  - Monitor for now, if febrile will start antibiotics  - Urology recommends gauze or drain sponge around the site        Goals of care/ Social:   #Goals of care   #Recurrent hospitalizations with progressive step-wise decline  Care conference today, 3/20 with [facility] care, medicine, gyn onc. Patient's daughter and one of her cousins attended in person. The teams discussed that we are worried the patient is in the process of dying despite all of the treatment that she has been getting this admission. We recommended that we focus on keeping her comfortable for the time that she has remaining. Her daughter asked  "if this meant dying in the hospital and stated that she would want her mother at home. This led to a discussion about home hospice which the patient's daughter was agreeable to. She asked if we could continue the current therapies while the patient remains in the hospital which is reasonable to maximize time. We did broach the topic of code status and they were very clear that they want the patient to remain full code at this time.  Discussed with patient's daughter regarding her current decline and necessity of intubation given that she was failing BiPAP and requiring intubation.  Extensively discussed with daughter Joy that intubating the patient was high risk with a high likelihood that she may die during the procedure and that if we were to intubate it would be very unlikely that she would be able to be extubated successfully, which would be discordant with with the [facility] conference that was held on 3/20 regarding transfer to hospice.  Discussed with daughter about discussions had at goals of care conference and asked if this would be within patient's goals..  Daughter stated that she \"wanted everything that could be done\" to be done clarifying with patient she stated that she would want her mother to be intubated.  Unfortunately patient experienced PEA arrest shortly after intubation and again after being sent down to the CT scanner.  Called family and discussed with them both times.  Explained to daughter that patient has nonreversible factors that are leading to her heart stopping.  Discussed that by continuing to keep her CODE STATUS as full code that we may in fact be doing more harm to the patient than providing meaningful benefit for her at this time during this course of her care.  Discussed with other family members present in the family waiting room.  Daughter states that at this time she would like to continue all interventions and to continue her mother's full CODE STATUS.  -Ethics consult in " place              General Cares/Prophylaxis:    DVT Prophylaxis: Defer to primary service  GI Prophylaxis: PPI  Restraints: none  Family Communication: Informed daughter Joy via phone and in person  Code Status: FULL code as discussed with patient at bedside     Lines/tubes/drains:  - Port  - Shahid  - Art line  - CVC triple lumen   - ET tube in place     Disposition:  - Medical ICU     Patient seen and findings/plan discussed with medical ICU staff, Dr. Salcedo.    Anaya Paredes MD      Clinically Significant Risk Factors        # Hypokalemia: Lowest K = 3 mmol/L in last 2 days, will replace as needed  # Hypernatremia: Highest Na = 147 mmol/L in last 2 days, will monitor as appropriate  # Hyperchloremia: Highest Cl = 117 mmol/L in last 2 days, will monitor as appropriate      # Hypocalcemia: Lowest iCa = 4.1 mg/dL in last 2 days, will monitor and replace as appropriate   # Hypomagnesemia: Lowest Mg = 1.6 mg/dL in last 2 days, will replace as needed   # Hypoalbuminemia: Lowest albumin = 1.5 g/dL at 3/21/2025 12:10 AM, will monitor as appropriate  # Coagulation Defect: INR = 2.39 (Ref range: 0.85 - 1.15) and/or PTT = 128 Seconds (Ref range: 22 - 38 Seconds), will monitor for bleeding  # Thrombocytopenia: Lowest platelets = 10 in last 2 days, will monitor for bleeding   # Hypertension: Noted on problem list             # Severe Malnutrition: based on nutrition assessment and treatment provided per dietitian's recommendations.    # Financial/Environmental Concerns: none                  -----------------------------------------------------------------------    HISTORY PRESENTING ILLNESS:     Patient with long history of stepwise decline in the setting of her cancer.  GOC done today with plans for patient to be discharged to home hospice.  Unfortunately, patient began to experience acute hypoxic respiratory failure and was seen to have left eye deviation concerning for stroke with upper extremity twitching  concerning for seizure.  Rapid response was called.  Patient was placed onto BiPAP without improvement in oxygen saturations.  Decision was made to intubate patient after lengthy discussion with daughter regarding the risks given patient's overall declining health.    REVIEW OF SYSTEMS: Unable to assess due to AMS and subsequent sedation     PAST MEDICAL HISTORY:   Past Medical History:   Diagnosis Date    Atrial fibrillation (H)     Depression     Hypertension     Malignant neoplasm of endocervix (H)     Tx with radiation    Near syncope 11/07/2024    Orthopnea 09/21/2024    Other chronic pain     Low back    Schizophrenia (H)     Urinary incontinence      SURGICAL HISTORY:  Past Surgical History:   Procedure Laterality Date    ABDOMINOPLASTY      BIOPSY CERVICAL, LOCAL EXCISION, SINGLE/MULTIPLE  10/26/2011    Procedure:BIOPSY CERVICAL, LOCAL EXCISION, SINGLE/MULTIPLE; EUA, cervical biopsies; Surgeon:BETTY TINEO; Location:[facility] OR    BIOPSY VAGINAL N/A 06/08/2021    Procedure: BIOPSY, VAGINA;  Surgeon: Dany Perez MD;  Location: [facility]    CYSTOSCOPY N/A 05/17/2024    Procedure: Cystoscopy;  Surgeon: Dany Perez MD;  Location: [facility]    CYSTOSTOMY, INSERT TUBE SUPRAPUBIC, COMBINED  07/12/2024    Procedure: Insertion of supra-pubic catheter;  Surgeon: Dany Perez MD;  [facility]: [facility]    DILATION AND CURETTAGE, WITH ULTRASOUND GUIDANCE N/A 05/17/2024    Procedure: Dilation and curettage of the uterus with drainage of uterine fluid under ultrasound guidance, lysis of vaginal adhesions;  Surgeon: Dany Perez MD;  Location: [facility]    EXAM UNDER ANESTHESIA PELVIC N/A 03/12/2020    Procedure: Exam under anesthsia, vaginal biopsies, possible CO2 laser of the vagina;  Surgeon: Lilliam Roy MD;  [facility]    GI SURGERY  2004    gastric bypass    HYSTERECTOMY TOTAL ABDOMINAL, BILATERAL SALPINGO-OOPHORECTOMY, COMBINED Bilateral 07/12/2024    Procedure: Diagnostic laparoscopy converted to  exploratory laparotomy, total abdominal hysterectomy, bilateral salpingo-oophorectomy, lysis of adhesions >60 min;  Surgeon: Dany Perez MD;  [facility]: [facility]    INSERT PORT VASCULAR ACCESS N/A 08/26/2024    Procedure: Insert port vascular access;  Surgeon: Avery Gregory MD;  [facility]    IR CHEST PORT PLACEMENT > 5 YRS OF AGE  08/26/2024    IR FOLLOW UP VISIT OUTPATIENT  1/7/2025    IR NEPHROSTOMY TUBE PLACEMENT LEFT  11/29/2024    LASER CO2 VAGINA N/A 03/02/2015    Procedure: LASER CO2 VAGINA;  Surgeon: Mariela Abdalla MD;  Location: [facility]    LASER CO2 VAGINA N/A 05/24/2019    Procedure: Exam under anesthesia, vaginal biopsies, CO2 laser of the vagina;  Surgeon: Lilliam Roy MD;  Location: [facility]    LASER CO2 VAGINA N/A 06/08/2021    Procedure: Exam under anesthesia, laser ablation of the upper vagina;  Surgeon: Dany Perez MD;  Location: [facility]    PICC DOUBLE LUMEN PLACEMENT Left 11/28/2024    left basilic 5 fr dl power picc 44 cm    REPAIR BLADDER N/A 07/12/2024    Procedure: Repair bladder;  Surgeon: Dany Perez MD;  Location: [facility]     SOCIAL HISTORY:  Social History     Socioeconomic History    Marital status: Single     Spouse name: None    Number of children: None    Years of education: None    Highest education level: None   Tobacco Use    Smoking status: Never    Smokeless tobacco: Never   Substance and Sexual Activity    Alcohol use: Not Currently    Drug use: No     Social Drivers of Health     Financial Resource Strain: Unknown (3/12/2025)    Financial Resource Strain     Within the past 12 months, have you or your family members you live with been unable to get utilities (heat, electricity) when it was really needed?: Patient unable to answer   Food Insecurity: Unknown (3/12/2025)    Food Insecurity     Within the past 12 months, did you worry that your food would run out before you got money to buy more?: Patient unable to answer     Within the past 12 months,  did the food you bought just not last and you didn t have money to get more?: Patient unable to answer   Transportation Needs: Unknown (3/12/2025)    Transportation Needs     Within the past 12 months, has lack of transportation kept you from medical appointments, getting your medicines, non-medical meetings or appointments, work, or from getting things that you need?: Patient unable to answer   Interpersonal Safety: Unknown (3/12/2025)    Interpersonal Safety     Do you feel physically and emotionally safe where you currently live?: Patient unable to answer     Within the past 12 months, have you been hit, slapped, kicked or otherwise physically hurt by someone?: Patient unable to answer     Within the past 12 months, have you been humiliated or emotionally abused in other ways by your partner or ex-partner?: Patient unable to answer   Housing Stability: Unknown (3/12/2025)    Housing Stability     Do you have housing? : Patient unable to answer     Are you worried about losing your housing?: Patient unable to answer     FAMILY HISTORY:   Family History   Problem Relation Age of Onset    Heart Disease Father     Heart Failure Father     No Known Problems Brother     No Known Problems Brother     No Known Problems Brother     Pancreatitis Brother     Hypertension Sister     Peripheral Vascular Disease Sister     No Known Problems Sister     No Known Problems Son     No Known Problems Daughter      ALLERGIES:   Allergies   Allergen Reactions    Ibuprofen Nausea and Vomiting    Shrimp     Sulfa Antibiotics Rash     Patient started on bactrim 7/24/24 tolerating medication without rash on 7/25      MEDICATIONS:  Current Facility-Administered Medications   Medication Dose Route Frequency Provider Last Rate Last Admin    albuterol (PROVENTIL HFA/VENTOLIN HFA) inhaler  1-2 puff Inhalation Q4H PRN Salvador Dickerson MD        albuterol (PROVENTIL) neb solution 2.5 mg  2.5 mg Nebulization Q4H PRN Anaya Paredes MD         artificial saliva (BIOTENE MT) solution 1 spray  1 spray Mouth/Throat 4x Daily Salvador Dickerson MD   1 spray at 03/20/25 1607    bisacodyl (DULCOLAX) suppository 10 mg  10 mg Rectal Daily PRN Salvador Dickerson MD        buprenorphine (BUTRANS) 20 MCG/HR WK patch 1 patch  1 patch Transdermal Weekly Kamilah De La Torre PA-C   1 patch at 03/17/25 1757    buprenorphine (BUTRANS) Patch in Place   Transdermal Q8H Kamilah De La Torre PA-C        calcium carbonate (TUMS) chewable tablet 1,000 mg  1,000 mg Oral 4x Daily PRN Salvador Dickerson MD        chlorhexidine (PERIDEX) 0.12 % solution 15 mL  15 mL Mouth/Throat Q12H Anaya Paredes MD        dextrose 10% and 0.45% NaCl infusion   Intravenous Continuous Jolanta Peralta  mL/hr at 03/20/25 1019 New Bag at 03/20/25 1019    dextrose 10% infusion   Intravenous Continuous PRN Deepali Negrete MD   Stopped at 03/16/25 1455    glucose gel 15-30 g  15-30 g Oral Q15 Min PRN Anaya Paredes MD        Or    dextrose 50 % injection 25-50 mL  25-50 mL Intravenous Q15 Min PRN Anaya Paredes MD        Or    glucagon injection 1 mg  1 mg Subcutaneous Q15 Min PRN Anaya Paredes MD        EPINEPHrine (ADRENALIN) 5 mg in  mL infusion  0.01-0.3 mcg/kg/min Intravenous Continuous Ellen Monzon MD 10.9 mL/hr at 03/21/25 0300 0.05 mcg/kg/min at 03/21/25 0300    [Held by provider] fentaNYL (SUBLIMAZE) 50 mcg/mL bolus from pump  25 mcg Intravenous Q1H PRN Anaya Paredes MD        [Held by provider] fentaNYL (SUBLIMAZE) infusion   mcg/hr Intravenous Continuous Anaya Paredes MD   Stopped at 03/21/25 0057    lactated ringers BOLUS 1,000 mL  1,000 mL Intravenous Once Ellen Monzon  mL/hr at 03/21/25 0147 1,000 mL at 03/21/25 0147    Lidocaine (LIDOCARE) 4 % Patch 1 patch  1 patch Transdermal Daily PRN Kamilah De La Torre PA-C        lidocaine (LMX4) cream   Topical Q1H PRN Salvador Dickerson MD   Given at 03/08/25 0948    lidocaine 1 % 0.1-1 mL  0.1-1 mL Other  Q1H PRN Salvador Dickerson MD        linezolid (ZYVOX) infusion 600 mg  600 mg Intravenous Q12H Jolanta Peralta MD   600 mg at 03/21/25 0325    [Held by provider] propofol (DIPRIVAN) infusion  5-75 mcg/kg/min Intravenous Continuous Anaya Paredes MD   Stopped at 03/21/25 0056    And    [Held by provider] propofol (DIPRIVAN) bolus from bag or syringe pump  10 mg Intravenous Q15 Min PRN Anaya Paredes MD        And    Medication Instruction   Does not apply Continuous PRN Anaya Paredes MD        miconazole (MICATIN) 2 % powder   Topical BID PRN Salvador Dickerson MD        naloxone (NARCAN) injection 0.2 mg  0.2 mg Intravenous Q2 Min PRN Salvador Dickerson MD        Or    naloxone (NARCAN) injection 0.4 mg  0.4 mg Intravenous Q2 Min PRN Salvador Dickerson MD        Or    naloxone (NARCAN) injection 0.2 mg  0.2 mg Intramuscular Q2 Min PRN Salvador Dickerson MD        Or    naloxone (NARCAN) injection 0.4 mg  0.4 mg Intramuscular Q2 Min PRN Salvador Dickerson MD        norepinephrine (LEVOPHED) 16 mg in  mL infusion MAX CONC CENTRAL LINE  0.01-0.6 mcg/kg/min Intravenous Continuous Storm Salcedo MD 6.8 mL/hr at 03/21/25 0300 0.1 mcg/kg/min at 03/21/25 0300    OLANZapine zydis (zyPREXA) ODT half-tab 2.5 mg  2.5 mg Oral BID Deepali Negrete MD   2.5 mg at 03/19/25 0921    ondansetron (ZOFRAN ODT) ODT tab 4 mg  4 mg Oral Q6H PRN Salvador Dickerson MD        Or    ondansetron (ZOFRAN) injection 4 mg  4 mg Intravenous Q6H PRN Salvador Dickerson MD        pantoprazole (PROTONIX) 2 mg/mL suspension 40 mg  40 mg Per Feeding Tube QAM AC Anaya Paredes MD        Or    pantoprazole (PROTONIX) IV push injection 40 mg  40 mg Intravenous QAM AC Anaya Paredes MD        piperacillin-tazobactam (ZOSYN) 4.5 g vial to attach to  mL bag  4.5 g Intravenous Q6H Storm Salcedo MD   4.5 g at 03/21/25 0248    senna-docusate (SENOKOT-S/PERICOLACE) 8.6-50 MG per tablet 1 tablet  1 tablet Oral BID PRN Salvador Dickerson,  MD        Or    senna-docusate (SENOKOT-S/PERICOLACE) 8.6-50 MG per tablet 2 tablet  2 tablet Oral BID PRN Salvador Dickerson MD        sodium chloride (PF) 0.9% PF flush 10-20 mL  10-20 mL Intracatheter q1 min prn Deepali Negrete MD        sodium chloride (PF) 0.9% PF flush 10-20 mL  10-20 mL Intracatheter Q1H PRN Deepali Negrete MD        sodium chloride (PF) 0.9% PF flush 10-20 mL  10-20 mL Intracatheter Q28 Days Deepali Negrete MD   10 mL at 03/13/25 1630    sodium chloride (PF) 0.9% PF flush 3 mL  3 mL Intracatheter Q8H Salvador Dickerson MD   3 mL at 03/16/25 0025    sodium chloride (PF) 0.9% PF flush 3 mL  3 mL Intracatheter q1 min prn Salvador Dickerson MD        sodium chloride 0.9 % bag 500mL for CT scan flush use  180 mL Intravenous Once Anaya Paredes MD        thiamine (B-1) injection 500 mg  500 mg Intravenous TID Anaya Paredes MD        Followed by    [START ON 3/23/2025] thiamine (B-1) injection 250 mg  250 mg Intravenous Daily Anaya Paredes MD        Followed by    [START ON 3/28/2025] thiamine (B-1) tablet 100 mg  100 mg Oral Daily Anaya Paredes MD           PHYSICAL EXAMINATION:  Temp:  [91.1  F (32.8  C)-96.3  F (35.7  C)] 93.7  F (34.3  C)  Pulse:  [] 85  Resp:  [16-20] 16  BP: ()/() 77/60  MAP:  [64 mmHg-149 mmHg] 71 mmHg  Arterial Line BP: ()/() 94/60  FiO2 (%):  [100 %] 100 %  SpO2:  [57 %-100 %] 95 %  General: Intubated, Sedated   Neuro: Unable to assess due to sedation  Pulm/Resp: Mechanical breath sounds  Cardiac: Pulses intact  :  kent catheter in place, urine yellow and clear    LABS: Reviewed.   Arterial Blood Gases   Recent Labs   Lab 03/21/25  0145 03/21/25  0045   PH 7.43 7.48*   PCO2 27* 23*   PO2 348* 414*   HCO3 18* 17*     Complete Blood Count   Recent Labs   Lab 03/21/25  0010 03/20/25  2150 03/20/25  0908 03/20/25  0638   WBC 8.9 5.2 3.4* 3.0*   HGB 7.3* 7.7* 7.8* 7.0*   PLT 10* 23* 14* 11*     Basic Metabolic Panel  Recent  Labs   Lab 03/21/25  0249 03/21/25  0010 03/20/25 2150 03/20/25 2135 03/20/25  0924 03/20/25  0908 03/19/25  0414 03/19/25  0238   NA  --  147* 138  --   --  140  --  139   POTASSIUM  --  4.0 3.4  --   --  3.0*  --  3.6   CHLORIDE  --  117* 113*  --   --  115*  --  111*   CO2  --  24 16*  --   --  18*  --  20*   BUN  --  10.6 16.6  --   --  17.8  --  18.5   CR  --  0.76 0.88  --   --  0.69  --  0.79   GLC 85 71 113* 111*   < > 121*   < > 127*    < > = values in this interval not displayed.     Liver Function Tests  Recent Labs   Lab 03/21/25  0010 03/20/25 2150 03/20/25  0908 03/19/25  0238   AST 38 30 36 24   ALT 23 25 27 21   ALKPHOS 102 113 109 115   BILITOTAL 0.4 0.4 0.3 0.4   ALBUMIN 1.5* 1.8* 1.9* 2.2*   INR 2.39* 1.61*  --   --      Coagulation Profile  Recent Labs   Lab 03/21/25 0010 03/20/25 2150   INR 2.39* 1.61*   * 51*       IMAGING:  Recent Results (from the past 24 hours)   XR Chest Port 1 View    Impression    RESIDENT PRELIMINARY INTERPRETATION  IMPRESSION:   Moderate right, and small left pleural effusions with adjacent  consolidative opacities, as seen on CT 3/17/2025. Differential  diagnosis for consolidations continues to include relaxation  atelectasis, pulmonary edema and infection.   XR Chest Port 1 View    Impression    RESIDENT PRELIMINARY INTERPRETATION  Impression: Endotracheal tube tip terminates 3.0 cm from the sathya.  Otherwise no appreciable change from 2 hours prior given differences  in technique.   CT Head w/o Contrast    Narrative    EXAM: CT HEAD W/O CONTRAST  LOCATION: [facility]  DATE: 3/21/2025    INDICATION: Change in mentation, severe thrombocytopenia.  COMPARISON: None.  TECHNIQUE: Routine CT Head without IV contrast. Multiplanar reformats. Dose reduction techniques were used.    FINDINGS:  INTRACRANIAL CONTENTS: No intracranial hemorrhage, extraaxial collection, or mass effect. Small age-indeterminate though  possibly recent lacunar infarcts in each thalamus, slightly larger on the right. Mild volume loss and presumed chronic small vessel   ischemia.    VISUALIZED ORBITS/SINUSES/MASTOIDS: No intraorbital abnormality. No paranasal sinus mucosal disease. No middle ear or mastoid effusion.    BONES/SOFT TISSUES: No acute abnormality.      Impression    IMPRESSION:  1.  Small age-indeterminate though possibly recent lacunar infarcts in each thalamus.    2.  No hemorrhage or mass effect.    CT head findings were discussed with Dr. Wise at 0153 hours on 3/21/2025.   CTA Head Neck w Contrast    Narrative    EXAM: CTA HEAD NECK W CONTRAST  LOCATION: [facility]  DATE: 03/21/2025    INDICATION: Code Stroke, evaluate for LVO. PLEASE READ IMMEDIATELY  COMPARISON: None.  CONTRAST: 67 mL Isovue 370.  TECHNIQUE: Head and neck CT angiogram with IV contrast helical CT images of the head and neck vessels obtained during the arterial phase of intravenous contrast administration. Axial 2D reconstructed images and multiplanar 3D MIP reconstructed images of   the head and neck vessels were performed by the technologist. Dose reduction techniques were used. All stenosis measurements made according to NASCET criteria unless otherwise specified.    HEAD CT:  FINDINGS: The examination is essentially nondiagnostic. Contrast is seen within the left carotid artery extending intracranially. This artery appears grossly patent. 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   The remainder of the intracranial and neck vessels are not well opacified. Large pleural effusions.      Impression    IMPRESSION:   1.  Essentially nondiagnostic study apart from patency of the left carotid artery. The remainder of the neck and intracranial vessels are not well opacified due to technical issues and the patient coding on the table.    2.  Large bilateral pleural  effusions.    Findings were relayed to Dr. Wise at 0153 on 03/21/2025.

## 2025-03-21 NOTE — CONSULTS
The purpose of this note, including any accompanying recommendation, is advisory only concerning ethical principles and considerations related to this case. Determination of appropriate patient care decisions is the responsibility of the clinical team in consultation with patients and their decision makers and relevant institutional policy. Legal and policy questions should be addressed with Risk Management.     This note documents the clinical ethics service's continued involvement following a consult request last week and several conversations with team members on our service and with Aranza's care team. See also my consult notes from March 14 and 19.    Aranza's medical course, insufficient decisional capacity, and increasingly aggressive life support in the face of her worsening disease course have previously been well described here. The clinical team's documented conversations with Aranza's daughter Joy, whom Aranza designated prior to this hospitalization as her surrogate decision maker (albeit on an invalid healthcare directive), indicate Joy wants her mother to be comfortable. Joy's choices have fluctuated between (a) a desire for full code and aggressive life support and (b) hospice, as well as between (a) wanting her mother to be cared for and even die at home under hospice care but (b) wanting the clinical care team to continue interventions that can only be done in the hospital. Multiple reports are that Aranza's daughter struggles with accepting her mother's condition, declines to involve other close family members with the clinical team for unclear reasons, does not want to participate in further GOC meetings with the staff, and believes the medical team could be doing more for her mother. There is documentation that prior to this hospitalization, the family decided against continued cancer treatment. It would be helpful to try and involve more family members in GOC conversations if Joy now  refuses to participate.    Conflicting GOC in a critically ill patient with a poor prognosis, whose decisionmaker either lacks understanding and appreciation of treatment decisions or refuses to participate, can create clinical challenges for the team. Professionals also have an interest in not being compelled to violate professional judgment and perform interventions they believe to be inappropriate or wrong. Communication with surrogates can be inclusive, collaborative and empathic while also being clear that the medical team has the professional obligations described above. Those obligations include maximizing spiritual and physical comfort.    Ethical obligations justify avoiding escalating to interventions that have no reasonable likelihood of leading to a patient ever surviving outside an acute care setting. The competing ethical considerations include that the burdens and risks of the intervention will not change the outcome, and thus either directly cause harm or cause increased opportunity for harm with no benefit (meaning survival outside an acute care setting.) Patients have an interest in not being harmed.     Non-escalation is ethically appropriate when:  Consensus judgment by medical providers concludes that there is no reasonable expectation that the patient will improve to survive outside an acute care setting.  Honoring patients' rights in avoiding the burdens of unwarranted treatment, and medical professionals obligations to not cause harm without proportionate benefit.  Fair process exists in which notice, in unambiguous and understandable terms, is given to patients' surrogates. And if they wish, they can seek an impartial second opinion, and/or arrange for transfer to an accepting alternate provider.    As Aranza is new to the current ICU team and is still undergoing a comprehensive multisystem assessment, the clinical team will have to be vigilant in continuously judging what constitutes  escalation to potentially warranted vs unwarranted treatment.     Thank you for continuing to involve us in ethical considerations for Aranza's care. Please reach out to us again with further issues.    Emily Lao DNP, KARL, RN  F Clinical Ethics Consult Service

## 2025-03-21 NOTE — PROGRESS NOTES
Pt intubated for airway protection by Anesthesia with an ETT Cuffed 7 mm-Secured at (cm): 21 cm at teeth/gums with an ETAD. ETT placement confirmed with color change on colormetric and bilateral breath sounds. Lung sounds are clear bilaterally.     Pt was placed on the following vent settings:    Vent Mode: (Continuous Mandatory Ventilation/ Assist Control)  Resp Rate (Set): 14 breaths/min  Tidal Volume (Set, mL): 400 mL   FiO2: 100 %   PEEP (cm H2O): 5 cmH2O      Inspiratory Time (sec): 1 sec  Sensitivity Flow Triggered (L/min): 2 L/min    Ambu bag, mask, and valve present at bedside.    Lulu Smith RT on 3/21/2025 at 1:35 AM

## 2025-03-21 NOTE — PROCEDURES
[facility]    Central line    Date/Time: 3/21/2025 12:36 AM    Performed by: Ellen Monzon MD  Authorized by: Storm Salcedo MD  Indications: vascular access      UNIVERSAL PROTOCOL   Site Marked: NA  Prior Images Obtained and Reviewed:  NA  Required items: Required blood products, implants, devices and special equipment available    Patient identity confirmed:  Hospital-assigned identification number  NA - No sedation, light sedation, or local anesthesia  Confirmation Checklist:  Procedure was appropriate and matched the consent or emergent situation  Universal Protocol: the Joint Commission Universal Protocol was followed    Preparation: Patient was prepped and draped in usual sterile fashion      SEDATION    Patient Sedated: No      Skin prep agent dried: skin prep agent completely dried prior to procedure  Patient position: flat  Catheter type: triple lumen  Ultrasound guidance: yes  Sterile ultrasound techniques: sterile gel and sterile probe covers were used  Number of attempts: 1  Successful placement: yes  Post-procedure: line sutured and dressing applied  Assessment: blood return through all ports      PROCEDURE    Patient Tolerance:  Patient tolerated the procedure well with no immediate complications  Length of time physician/provider present for 1:1 monitoring during sedation: 20      Ellen Monzon MD, MD on 3/21/2025 at 12:37 AM

## 2025-03-21 NOTE — CODE/RAPID RESPONSE
Stroke Code Nurse-Responder Note    Arrival Time to Stroke Code: 2208    Stroke Code Team interventions:   {interventions:668754}    ED/Bedside Nurse providing handoff: Liseth Saez    Time left for CT: Pt too unstable for CT due to respirtory status CT delayed until                   Okay'd per Neuro, primary, and MICU team    Time arrived to next location (ED/Unit/IR): Arrive to ICU @     ED/Bedside Nurse given handoff (name/time): ***    Eva Jimenez RN

## 2025-03-21 NOTE — ANESTHESIA PROCEDURE NOTES
Airway       Patient location during procedure: OR       Procedure Start/Stop Times: 3/20/2025 11:28 PM  Staff -        Anesthesiologist:  Adrianne Delgado MD       CRNA: Chelita Fabian APRN CRNA       Performed By: CRNA  Consent for Airway        Urgency: emergent       Consent: The procedure was performed in an emergent situation.  Indications and Patient Condition       Indications for airway management: quang-procedural       Induction type:RSI       Mask difficulty assessment: 1 - vent by mask    Final Airway Details       Final airway type: endotracheal airway       Successful airway: ETT - single  Endotracheal Airway Details        ETT size (mm): 7.0       Cuffed: yes       Successful intubation technique: direct laryngoscopy and video laryngoscopy       DL Blade Type: Other (comment)       VL Blade Size: Glidescope 3       Grade View of Cords: 1       Adjucts: stylet       Position: Right       Measured from: gums/teeth       Secured at (cm): 21       Bite block used: None    Post intubation assessment        Placement verified by: capnometry, equal breath sounds and chest rise        Number of attempts at approach: 1       Number of other approaches attempted: 0       Secured with: commercial tube agarwal (secured by RT)       Ease of procedure: easy       Dentition: Intact and Unchanged    Medication(s) Administered   Medication Administration Time: 3/20/2025 11:28 PM

## 2025-03-21 NOTE — PROGRESS NOTES
Patient was a RRT on 5A room 203 due to hypotension with less responsiveness and inability to obtain a good SpO2 reading with shallow respirations.  The patient was initially placed on HFNC,  later escalated to BIPAP, and subsequently transferred to 4A room 210. Shortly after arrival, the patient was intubated emergently. Later went into PEA arrest, CPR/BVM initiated resulting in ROSC. Patient coded again in CT room and achieved ROSC. Patient was brought up to 4A 210 and placed back on the ventilator.    Will continue to monitor closely    Greg Wilson RRT

## 2025-03-22 ENCOUNTER — APPOINTMENT (OUTPATIENT)
Dept: NEUROLOGY | Facility: CLINIC | Age: 70
End: 2025-03-22
Payer: MEDICAID

## 2025-03-22 ENCOUNTER — APPOINTMENT (OUTPATIENT)
Dept: GENERAL RADIOLOGY | Facility: CLINIC | Age: 70
End: 2025-03-22
Payer: COMMERCIAL

## 2025-03-22 LAB
ALBUMIN SERPL BCG-MCNC: 2.3 G/DL (ref 3.5–5.2)
ALLEN'S TEST: ABNORMAL
ALP SERPL-CCNC: 128 U/L (ref 40–150)
ALT SERPL W P-5'-P-CCNC: 34 U/L (ref 0–50)
ANION GAP SERPL CALCULATED.3IONS-SCNC: 13 MMOL/L (ref 7–15)
AST SERPL W P-5'-P-CCNC: 71 U/L (ref 0–45)
BASE EXCESS BLDA CALC-SCNC: -4.9 MMOL/L (ref -3–3)
BASE EXCESS BLDA CALC-SCNC: -5.4 MMOL/L (ref -3–3)
BASE EXCESS BLDA CALC-SCNC: -5.5 MMOL/L (ref -3–3)
BASE EXCESS BLDA CALC-SCNC: -8.3 MMOL/L (ref -3–3)
BASOPHILS # BLD AUTO: 0 10E3/UL (ref 0–0.2)
BASOPHILS NFR BLD AUTO: 0 %
BILIRUB SERPL-MCNC: 0.9 MG/DL
BLD PROD TYP BPU: NORMAL
BLOOD COMPONENT TYPE: NORMAL
BUN SERPL-MCNC: 18.5 MG/DL (ref 8–23)
BURR CELLS BLD QL SMEAR: ABNORMAL
CA-I BLD-MCNC: 4.4 MG/DL (ref 4.4–5.2)
CALCIUM SERPL-MCNC: 7.1 MG/DL (ref 8.8–10.4)
CHLORIDE SERPL-SCNC: 112 MMOL/L (ref 98–107)
CODING SYSTEM: NORMAL
CREAT SERPL-MCNC: 1.07 MG/DL (ref 0.51–0.95)
CROSSMATCH: NORMAL
EGFRCR SERPLBLD CKD-EPI 2021: 55 ML/MIN/1.73M2
EOSINOPHIL # BLD AUTO: 0 10E3/UL (ref 0–0.7)
EOSINOPHIL NFR BLD AUTO: 0 %
ERYTHROCYTE [DISTWIDTH] IN BLOOD BY AUTOMATED COUNT: 20.9 % (ref 10–15)
ERYTHROCYTE [DISTWIDTH] IN BLOOD BY AUTOMATED COUNT: 21.1 % (ref 10–15)
ERYTHROCYTE [DISTWIDTH] IN BLOOD BY AUTOMATED COUNT: 26.8 % (ref 10–15)
GLUCOSE BLDC GLUCOMTR-MCNC: 56 MG/DL (ref 70–99)
GLUCOSE BLDC GLUCOMTR-MCNC: 75 MG/DL (ref 70–99)
GLUCOSE BLDC GLUCOMTR-MCNC: 77 MG/DL (ref 70–99)
GLUCOSE BLDC GLUCOMTR-MCNC: 83 MG/DL (ref 70–99)
GLUCOSE BLDC GLUCOMTR-MCNC: 85 MG/DL (ref 70–99)
GLUCOSE SERPL-MCNC: 99 MG/DL (ref 70–99)
HCO3 BLD-SCNC: 15 MMOL/L (ref 21–28)
HCO3 BLD-SCNC: 17 MMOL/L (ref 21–28)
HCO3 BLD-SCNC: 17 MMOL/L (ref 21–28)
HCO3 BLD-SCNC: 18 MMOL/L (ref 21–28)
HCO3 SERPL-SCNC: 13 MMOL/L (ref 22–29)
HCT VFR BLD AUTO: 22 % (ref 35–47)
HCT VFR BLD AUTO: 24.9 % (ref 35–47)
HCT VFR BLD AUTO: 25.4 % (ref 35–47)
HGB BLD-MCNC: 6.2 G/DL (ref 11.7–15.7)
HGB BLD-MCNC: 7.3 G/DL (ref 11.7–15.7)
HGB BLD-MCNC: 8.3 G/DL (ref 11.7–15.7)
HGB BLD-MCNC: 8.7 G/DL (ref 11.7–15.7)
IMM GRANULOCYTES # BLD: 0.1 10E3/UL
IMM GRANULOCYTES # BLD: 0.1 10E3/UL
IMM GRANULOCYTES # BLD: 0.3 10E3/UL
IMM GRANULOCYTES NFR BLD: 1 %
IMM GRANULOCYTES NFR BLD: 1 %
IMM GRANULOCYTES NFR BLD: 3 %
ISSUE DATE AND TIME: NORMAL
LACTATE SERPL-SCNC: 2 MMOL/L (ref 0.7–2)
LACTATE SERPL-SCNC: 2 MMOL/L (ref 0.7–2)
LACTATE SERPL-SCNC: 2.4 MMOL/L (ref 0.7–2)
LACTATE SERPL-SCNC: 2.4 MMOL/L (ref 0.7–2)
LACTATE SERPL-SCNC: 3.1 MMOL/L (ref 0.7–2)
LACTATE SERPL-SCNC: 3.4 MMOL/L (ref 0.7–2)
LYMPHOCYTES # BLD AUTO: 1.1 10E3/UL (ref 0.8–5.3)
LYMPHOCYTES # BLD AUTO: 1.3 10E3/UL (ref 0.8–5.3)
LYMPHOCYTES # BLD AUTO: 1.4 10E3/UL (ref 0.8–5.3)
LYMPHOCYTES NFR BLD AUTO: 11 %
LYMPHOCYTES NFR BLD AUTO: 13 %
LYMPHOCYTES NFR BLD AUTO: 15 %
MAGNESIUM SERPL-MCNC: 2.2 MG/DL (ref 1.7–2.3)
MCH RBC QN AUTO: 30.3 PG (ref 26.5–33)
MCH RBC QN AUTO: 30.6 PG (ref 26.5–33)
MCH RBC QN AUTO: 30.7 PG (ref 26.5–33)
MCHC RBC AUTO-ENTMCNC: 33.2 G/DL (ref 31.5–36.5)
MCHC RBC AUTO-ENTMCNC: 33.3 G/DL (ref 31.5–36.5)
MCHC RBC AUTO-ENTMCNC: 34.3 G/DL (ref 31.5–36.5)
MCV RBC AUTO: 89 FL (ref 78–100)
MCV RBC AUTO: 91 FL (ref 78–100)
MCV RBC AUTO: 92 FL (ref 78–100)
MONOCYTES # BLD AUTO: 0.4 10E3/UL (ref 0–1.3)
MONOCYTES # BLD AUTO: 0.5 10E3/UL (ref 0–1.3)
MONOCYTES # BLD AUTO: 0.5 10E3/UL (ref 0–1.3)
MONOCYTES NFR BLD AUTO: 5 %
NEUTROPHILS # BLD AUTO: 7.7 10E3/UL (ref 1.6–8.3)
NEUTROPHILS # BLD AUTO: 8 10E3/UL (ref 1.6–8.3)
NEUTROPHILS # BLD AUTO: 8.3 10E3/UL (ref 1.6–8.3)
NEUTROPHILS NFR BLD AUTO: 80 %
NEUTROPHILS NFR BLD AUTO: 81 %
NEUTROPHILS NFR BLD AUTO: 81 %
NRBC # BLD AUTO: 0 10E3/UL
NRBC # BLD AUTO: 0 10E3/UL
NRBC # BLD AUTO: 0.1 10E3/UL
NRBC BLD AUTO-RTO: 0 /100
NRBC BLD AUTO-RTO: 0 /100
NRBC BLD AUTO-RTO: 1 /100
O2/TOTAL GAS SETTING VFR VENT: 40 %
O2/TOTAL GAS SETTING VFR VENT: 50 %
OXYHGB MFR BLDA: 99 % (ref 92–100)
PCO2 BLD: 21 MM HG (ref 35–45)
PCO2 BLD: 22 MM HG (ref 35–45)
PCO2 BLD: 23 MM HG (ref 35–45)
PCO2 BLD: 24 MM HG (ref 35–45)
PEEP: 5 CM H2O
PEEP: 5 CM H2O
PH BLD: 7.46 [PH] (ref 7.35–7.45)
PH BLD: 7.47 [PH] (ref 7.35–7.45)
PH BLD: 7.49 [PH] (ref 7.35–7.45)
PH BLD: 7.49 [PH] (ref 7.35–7.45)
PHOSPHATE SERPL-MCNC: 3.2 MG/DL (ref 2.5–4.5)
PLAT MORPH BLD: ABNORMAL
PLATELET # BLD AUTO: 28 10E3/UL (ref 150–450)
PLATELET # BLD AUTO: 30 10E3/UL (ref 150–450)
PLATELET # BLD AUTO: 31 10E3/UL (ref 150–450)
PO2 BLD: 178 MM HG (ref 80–105)
PO2 BLD: 180 MM HG (ref 80–105)
PO2 BLD: 182 MM HG (ref 80–105)
PO2 BLD: 229 MM HG (ref 80–105)
POTASSIUM SERPL-SCNC: 4.5 MMOL/L (ref 3.4–5.3)
PROT SERPL-MCNC: 3.7 G/DL (ref 6.4–8.3)
RBC # BLD AUTO: 2.38 10E6/UL (ref 3.8–5.2)
RBC # BLD AUTO: 2.74 10E6/UL (ref 3.8–5.2)
RBC # BLD AUTO: 2.84 10E6/UL (ref 3.8–5.2)
RBC MORPH BLD: ABNORMAL
SAO2 % BLDA: >100 % (ref 95–96)
SODIUM SERPL-SCNC: 138 MMOL/L (ref 135–145)
UNIT ABO/RH: NORMAL
UNIT NUMBER: NORMAL
UNIT STATUS: NORMAL
UNIT TYPE ISBT: 5100
WBC # BLD AUTO: 10.2 10E3/UL (ref 4–11)
WBC # BLD AUTO: 9.6 10E3/UL (ref 4–11)
WBC # BLD AUTO: 9.8 10E3/UL (ref 4–11)

## 2025-03-22 PROCEDURE — 250N000013 HC RX MED GY IP 250 OP 250 PS 637

## 2025-03-22 PROCEDURE — 258N000001 HC RX 258: Performed by: STUDENT IN AN ORGANIZED HEALTH CARE EDUCATION/TRAINING PROGRAM

## 2025-03-22 PROCEDURE — 99291 CRITICAL CARE FIRST HOUR: CPT | Mod: GC | Performed by: INTERNAL MEDICINE

## 2025-03-22 PROCEDURE — 83605 ASSAY OF LACTIC ACID: CPT

## 2025-03-22 PROCEDURE — 94003 VENT MGMT INPAT SUBQ DAY: CPT

## 2025-03-22 PROCEDURE — 82805 BLOOD GASES W/O2 SATURATION: CPT

## 2025-03-22 PROCEDURE — 95711 VEEG 2-12 HR UNMONITORED: CPT

## 2025-03-22 PROCEDURE — 250N000011 HC RX IP 250 OP 636

## 2025-03-22 PROCEDURE — 258N000001 HC RX 258

## 2025-03-22 PROCEDURE — 84100 ASSAY OF PHOSPHORUS: CPT | Performed by: STUDENT IN AN ORGANIZED HEALTH CARE EDUCATION/TRAINING PROGRAM

## 2025-03-22 PROCEDURE — 258N000003 HC RX IP 258 OP 636: Performed by: INTERNAL MEDICINE

## 2025-03-22 PROCEDURE — 200N000002 HC R&B ICU UMMC

## 2025-03-22 PROCEDURE — 95718 EEG PHYS/QHP 2-12 HR W/VEEG: CPT | Performed by: PSYCHIATRY & NEUROLOGY

## 2025-03-22 PROCEDURE — 71045 X-RAY EXAM CHEST 1 VIEW: CPT

## 2025-03-22 PROCEDURE — 250N000009 HC RX 250

## 2025-03-22 PROCEDURE — P9045 ALBUMIN (HUMAN), 5%, 250 ML: HCPCS | Performed by: INTERNAL MEDICINE

## 2025-03-22 PROCEDURE — P9016 RBC LEUKOCYTES REDUCED: HCPCS

## 2025-03-22 PROCEDURE — 82805 BLOOD GASES W/O2 SATURATION: CPT | Performed by: INTERNAL MEDICINE

## 2025-03-22 PROCEDURE — 250N000011 HC RX IP 250 OP 636: Performed by: INTERNAL MEDICINE

## 2025-03-22 PROCEDURE — 80053 COMPREHEN METABOLIC PANEL: CPT

## 2025-03-22 PROCEDURE — 99254 IP/OBS CNSLTJ NEW/EST MOD 60: CPT | Mod: GC | Performed by: INTERNAL MEDICINE

## 2025-03-22 PROCEDURE — 999N000157 HC STATISTIC RCP TIME EA 10 MIN

## 2025-03-22 PROCEDURE — 85025 COMPLETE CBC W/AUTO DIFF WBC: CPT

## 2025-03-22 PROCEDURE — 250N000011 HC RX IP 250 OP 636: Performed by: STUDENT IN AN ORGANIZED HEALTH CARE EDUCATION/TRAINING PROGRAM

## 2025-03-22 PROCEDURE — 82330 ASSAY OF CALCIUM: CPT

## 2025-03-22 PROCEDURE — 83735 ASSAY OF MAGNESIUM: CPT | Performed by: STUDENT IN AN ORGANIZED HEALTH CARE EDUCATION/TRAINING PROGRAM

## 2025-03-22 PROCEDURE — P9045 ALBUMIN (HUMAN), 5%, 250 ML: HCPCS

## 2025-03-22 PROCEDURE — 250N000009 HC RX 250: Performed by: INTERNAL MEDICINE

## 2025-03-22 PROCEDURE — 99291 CRITICAL CARE FIRST HOUR: CPT | Mod: GC | Performed by: PSYCHIATRY & NEUROLOGY

## 2025-03-22 PROCEDURE — 71045 X-RAY EXAM CHEST 1 VIEW: CPT | Mod: 26 | Performed by: RADIOLOGY

## 2025-03-22 RX ORDER — OLANZAPINE 2.5 MG/1
2.5 TABLET, FILM COATED ORAL 2 TIMES DAILY
Status: DISCONTINUED | OUTPATIENT
Start: 2025-03-22 | End: 2025-03-28

## 2025-03-22 RX ADMIN — THIAMINE HYDROCHLORIDE 500 MG: 100 INJECTION, SOLUTION INTRAMUSCULAR; INTRAVENOUS at 14:11

## 2025-03-22 RX ADMIN — Medication 2.5 MCG/KG/MIN: at 02:59

## 2025-03-22 RX ADMIN — Medication 1 SPRAY: at 16:09

## 2025-03-22 RX ADMIN — PIPERACILLIN AND TAZOBACTAM 4.5 G: 4; .5 INJECTION, POWDER, LYOPHILIZED, FOR SOLUTION INTRAVENOUS at 09:27

## 2025-03-22 RX ADMIN — THIAMINE HYDROCHLORIDE 500 MG: 100 INJECTION, SOLUTION INTRAMUSCULAR; INTRAVENOUS at 20:11

## 2025-03-22 RX ADMIN — LINEZOLID 600 MG: 600 INJECTION, SOLUTION INTRAVENOUS at 04:42

## 2025-03-22 RX ADMIN — Medication 1 SPRAY: at 20:22

## 2025-03-22 RX ADMIN — ALBUMIN HUMAN 12.5 G: 0.05 INJECTION, SOLUTION INTRAVENOUS at 05:03

## 2025-03-22 RX ADMIN — Medication 1 SPRAY: at 08:09

## 2025-03-22 RX ADMIN — ALBUMIN HUMAN 25 G: 0.05 INJECTION, SOLUTION INTRAVENOUS at 16:10

## 2025-03-22 RX ADMIN — DEXTROSE MONOHYDRATE 25 ML: 25 INJECTION, SOLUTION INTRAVENOUS at 14:29

## 2025-03-22 RX ADMIN — DEXTROSE MONOHYDRATE 1000 ML: 100 INJECTION, SOLUTION INTRAVENOUS at 14:32

## 2025-03-22 RX ADMIN — NOREPINEPHRINE BITARTRATE 0.08 MCG/KG/MIN: 0.06 INJECTION, SOLUTION INTRAVENOUS at 00:58

## 2025-03-22 RX ADMIN — CHLORHEXIDINE GLUCONATE 15 ML: 1.2 SOLUTION ORAL at 08:07

## 2025-03-22 RX ADMIN — Medication 1 MCG/KG/MIN: at 14:10

## 2025-03-22 RX ADMIN — Medication 1 SPRAY: at 14:10

## 2025-03-22 RX ADMIN — PANTOPRAZOLE SODIUM 40 MG: 40 INJECTION, POWDER, FOR SOLUTION INTRAVENOUS at 08:07

## 2025-03-22 RX ADMIN — THIAMINE HYDROCHLORIDE 500 MG: 100 INJECTION, SOLUTION INTRAMUSCULAR; INTRAVENOUS at 08:07

## 2025-03-22 RX ADMIN — PIPERACILLIN AND TAZOBACTAM 4.5 G: 4; .5 INJECTION, POWDER, LYOPHILIZED, FOR SOLUTION INTRAVENOUS at 03:00

## 2025-03-22 RX ADMIN — LEVETIRACETAM 750 MG: 500 INJECTION, SOLUTION INTRAVENOUS at 20:58

## 2025-03-22 RX ADMIN — LINEZOLID 600 MG: 600 INJECTION, SOLUTION INTRAVENOUS at 16:44

## 2025-03-22 RX ADMIN — LEVETIRACETAM 750 MG: 500 INJECTION, SOLUTION INTRAVENOUS at 09:29

## 2025-03-22 RX ADMIN — PIPERACILLIN AND TAZOBACTAM 4.5 G: 4; .5 INJECTION, POWDER, LYOPHILIZED, FOR SOLUTION INTRAVENOUS at 20:19

## 2025-03-22 RX ADMIN — CHLORHEXIDINE GLUCONATE 15 ML: 1.2 SOLUTION ORAL at 20:11

## 2025-03-22 RX ADMIN — ALBUMIN HUMAN 12.5 G: 0.05 INJECTION, SOLUTION INTRAVENOUS at 03:21

## 2025-03-22 RX ADMIN — PIPERACILLIN AND TAZOBACTAM 4.5 G: 4; .5 INJECTION, POWDER, LYOPHILIZED, FOR SOLUTION INTRAVENOUS at 16:08

## 2025-03-22 ASSESSMENT — ACTIVITIES OF DAILY LIVING (ADL)
ADLS_ACUITY_SCORE: 96

## 2025-03-22 NOTE — CONSULTS
Neurocritical Care Consultation    Reason for critical care consult: encephalopathy workup/concern for seizure  Consulting Team: MICU  Date of Service:  03/22/2025  Date of Admission:  3/4/2025  Hospital Day: 19    Interval History:  -EEG demonstrated a moderate to severe encephalopathy.  -Per primary team, multiple acute medical problems are evolving and they are recommending comfort measures to the family.    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 Hospital course complicated by MADHU, hypervolemia, acute on chronic pain, suspected dementia, decreased oral intake with severe malnutrition. A stroke code was called for left gaze deviation on 3/20/25, in the setting of significant respiratory decompensation.     The patient was transferred to ICU, intubated, had PEA arrest with ROSC. Once more medically stable, she was taken to CT for imaging, unfortunately while obtaining imaging, noted to be without pulses, suspected PEA arrest. CPR was initiated and code blue was called. ROSC was achieved.  Over the course of this process, the patient received a Keppra load and was started on maintenance Keppra.  While it is somewhat unclear what exactly occurred neurologically during the patient's event on the evening of 3/20- 3/21, there is some possibility of seizure and it is reasonable to continue Keppra.  CT imaging was eventually obtained, showing no acute intracranial pathology and relatively intact vasculature.     With regard to the cardiac arrests that occurred on 3/20-3/21, the  patient's neuroprognosis is indeterminate at this time.  However, we certainly do have deep concerns about the patient's capacity for recovery from any acute neurological injury that could have been associated with these cardiac arrests -- given her many underlying medical comorbidities and current acute illness.    Neuro  #Encephalopathy  #Left Gaze deviation, abnormal movements in bilateral UE  #Concern for hypoxic-ischemic injury associated with cardiac arrest  -Continue maintenance Keppra 750 mg bid   -Discontinue vEEG monitoring.  -Neurology will sign off at this time.  Please do not hesitate to reach out with further questions or concerns.      Clinically Significant Risk Factors        # Hypokalemia: Lowest K = 3.3 mmol/L in last 2 days, will replace as needed  # Hypernatremia: Highest Na = 147 mmol/L in last 2 days, will monitor as appropriate  # Hyperchloremia: Highest Cl = 117 mmol/L in last 2 days, will monitor as appropriate      # Hypocalcemia: Lowest iCa = 4.1 mg/dL in last 2 days, will monitor and replace as appropriate   # Hypomagnesemia: Lowest Mg = 1.6 mg/dL in last 2 days, will replace as needed   # Hypoalbuminemia: Lowest albumin = 1.5 g/dL at 3/21/2025 12:10 AM, will monitor as appropriate    # Coagulation Defect: INR = 2.27 (Ref range: 0.85 - 1.15) and/or PTT = 67 Seconds (Ref range: 22 - 38 Seconds), will monitor for bleeding  # Thrombocytopenia: Lowest platelets = 10 in last 2 days, will monitor for bleeding   # Hypertension: Noted on problem list             # Severe Malnutrition: based on nutrition assessment and treatment provided per dietitian's recommendations.    # Financial/Environmental Concerns: none          The patient was seen and discussed with the [facility] attending, Dr. Montgomery.    Gustavo Salamanca MD  Neurology, PGY3    Allergies   Allergen Reactions    Ibuprofen Nausea and Vomiting    Shrimp     Sulfa Antibiotics Rash     Patient started on bactrim 7/24/24 tolerating medication  without rash on 7/25        Current Medications:  Current Facility-Administered Medications   Medication Dose Route Frequency Provider Last Rate Last Admin    artificial saliva (BIOTENE MT) solution 1 spray  1 spray Mouth/Throat 4x Daily Salvador Dickerson MD   1 spray at 03/22/25 0809    buprenorphine (BUTRANS) 20 MCG/HR WK patch 1 patch  1 patch Transdermal Weekly Kamilah De La Torre PA-C   1 patch at 03/17/25 1757    buprenorphine (BUTRANS) Patch in Place   Transdermal Q8H Kamilah De La Torre PA-C        chlorhexidine (PERIDEX) 0.12 % solution 15 mL  15 mL Mouth/Throat Q12H Anaya Paredes MD   15 mL at 03/22/25 0807    levETIRAcetam (KEPPRA) 750 mg in sodium chloride 0.9 % 117.5 mL intermittent infusion  750 mg Intravenous BID Storm Salcedo  mL/hr at 03/22/25 0929 750 mg at 03/22/25 0929    lidocaine (viscous) (XYLOCAINE) 2 % solution 5 mL  5 mL Mouth/Throat Once Jonathan Shen MD        linezolid (ZYVOX) infusion 600 mg  600 mg Intravenous Q12H Jolanta Peralta  mL/hr at 03/22/25 0442 600 mg at 03/22/25 0442    [Held by provider] multivitamin w/minerals (THERA-VIT-M) tablet 1 tablet  1 tablet Per Feeding Tube Daily Jonathan Shen MD   1 tablet at 03/21/25 1643    [Held by provider] OLANZapine (zyPREXA) tablet 2.5 mg  2.5 mg Oral or Feeding Tube BID Storm Salcedo MD        pantoprazole (PROTONIX) 2 mg/mL suspension 40 mg  40 mg Per Feeding Tube QAM AC Anaya Paredes MD        Or    pantoprazole (PROTONIX) IV push injection 40 mg  40 mg Intravenous QAM Anaya Price MD   40 mg at 03/22/25 0807    piperacillin-tazobactam (ZOSYN) 4.5 g vial to attach to  mL bag  4.5 g Intravenous Q6H Storm Salcedo  mL/hr at 03/22/25 0300 4.5 g at 03/22/25 0927    sodium chloride (PF) 0.9% PF flush 10-20 mL  10-20 mL Intracatheter Q28 Days Deepali Negrete MD   10 mL at 03/13/25 1630    sodium chloride (PF) 0.9% PF flush 3 mL  3 mL Intracatheter Q8H Salvador Dickerson MD    3 mL at 03/21/25 1640    thiamine (B-1) injection 500 mg  500 mg Intravenous TID Storm Salcedo MD   500 mg at 03/22/25 0807    Followed by    [START ON 3/23/2025] thiamine (B-1) injection 250 mg  250 mg Intravenous Daily Storm Salcedo MD        Followed by    [START ON 3/28/2025] thiamine (B-1) tablet 100 mg  100 mg Oral or Feeding Tube Daily Storm Salcedo MD        wound support modular (EXPEDITE) bottle 60 mL  60 mL Oral or Feeding Tube Daily Storm Salcedo MD           PRN Medications:  Current Facility-Administered Medications   Medication Dose Route Frequency Provider Last Rate Last Admin    albuterol (PROVENTIL HFA/VENTOLIN HFA) inhaler  1-2 puff Inhalation Q4H PRN Salvador Dickerson MD        albuterol (PROVENTIL) neb solution 2.5 mg  2.5 mg Nebulization Q4H PRN Anaya Praedes MD        bisacodyl (DULCOLAX) suppository 10 mg  10 mg Rectal Daily PRN Salvador Dickerson MD        calcium carbonate (TUMS) chewable tablet 1,000 mg  1,000 mg Oral or Feeding Tube 4x Daily PRN Storm Salcedo MD        dextrose 10% infusion   Intravenous Continuous PRN Jonathan Shen MD        dextrose 10% infusion   Intravenous Continuous PRN Deepali Negrete MD   Stopped at 03/16/25 1455    glucose gel 15-30 g  15-30 g Oral Q15 Min PRN Anaya Paredes MD        Or    dextrose 50 % injection 25-50 mL  25-50 mL Intravenous Q15 Min PRN Anaya Paredes MD   50 mL at 03/21/25 1619    Or    glucagon injection 1 mg  1 mg Subcutaneous Q15 Min PRN Anaya Paredes MD        [Held by provider] fentaNYL (SUBLIMAZE) 50 mcg/mL bolus from pump  25 mcg Intravenous Q1H PRN Anaya Paredes MD        Lidocaine (LIDOCARE) 4 % Patch 1 patch  1 patch Transdermal Daily PRN Kamilah De La Torre PA-C        lidocaine (LMX4) cream   Topical Q1H PRN Salvador Dickerson MD   Given at 03/08/25 0948    lidocaine 1 % 0.1-1 mL  0.1-1 mL Other Q1H PRN Salvador Dickerson MD        [Held by provider] propofol (DIPRIVAN) bolus from bag or syringe  pump  10 mg Intravenous Q15 Min PRN Anaya Paredes MD        And    Medication Instruction   Does not apply Continuous PRN Anaya Paredes MD        miconazole (MICATIN) 2 % powder   Topical BID PRN Salvador Dickerson MD        naloxone (NARCAN) injection 0.2 mg  0.2 mg Intravenous Q2 Min PRN Salvador Dickerson MD        Or    naloxone (NARCAN) injection 0.4 mg  0.4 mg Intravenous Q2 Min PRN Salvador Dickerson MD        Or    naloxone (NARCAN) injection 0.2 mg  0.2 mg Intramuscular Q2 Min PRN Salvador Dickerson MD        Or    naloxone (NARCAN) injection 0.4 mg  0.4 mg Intramuscular Q2 Min PRN Salvador Dickerson MD        ondansetron (ZOFRAN ODT) ODT tab 4 mg  4 mg Oral Q6H PRN Salvador Dickerson MD        Or    ondansetron (ZOFRAN) injection 4 mg  4 mg Intravenous Q6H PRN Salvador Dickerson MD        senna-docusate (SENOKOT-S/PERICOLACE) 8.6-50 MG per tablet 1 tablet  1 tablet Oral or Feeding Tube BID PRN Sotrm Salcedo MD        Or    senna-docusate (SENOKOT-S/PERICOLACE) 8.6-50 MG per tablet 2 tablet  2 tablet Oral or Feeding Tube BID PRN Storm Salcedo MD        sodium chloride (PF) 0.9% PF flush 10-20 mL  10-20 mL Intracatheter q1 min prn Deepali Negrete MD        sodium chloride (PF) 0.9% PF flush 10-20 mL  10-20 mL Intracatheter Q1H PRN Deepali Negrete MD        sodium chloride (PF) 0.9% PF flush 3 mL  3 mL Intracatheter q1 min prn Salvador Dickerson MD           Infusions:  Current Facility-Administered Medications   Medication Dose Route Frequency Provider Last Rate Last Admin    dextrose 10% and 0.45% NaCl infusion   Intravenous Continuous Jolanta Peralta  mL/hr at 03/20/25 1019 New Bag at 03/20/25 1019    dextrose 10% infusion   Intravenous Continuous PRN Jonathan Shen MD        dextrose 10% infusion   Intravenous Continuous PRN Deepali Negrete MD   Stopped at 03/16/25 3183    [Held by provider] fentaNYL (SUBLIMAZE) infusion   mcg/hr Intravenous Continuous Anaya Paredes  MD JOYA   Stopped at 03/21/25 0057    [Held by provider] propofol (DIPRIVAN) infusion  5-75 mcg/kg/min Intravenous Continuous Anaya Paredes MD   Stopped at 03/21/25 0056    And    Medication Instruction   Does not apply Continuous PRN Anaya Paredes MD        norepinephrine (LEVOPHED) 16 mg in  mL infusion MAX CONC CENTRAL LINE  0.01-0.6 mcg/kg/min Intravenous Continuous Storm Salcedo MD 1.4 mL/hr at 03/22/25 0359 0.02 mcg/kg/min at 03/22/25 0359    phenylephrine (MICK-SYNEPHRINE) 50 mg in NaCl 0.9 % 250 mL infusion  0.1-6 mcg/kg/min (Dosing Weight) Intravenous Continuous Padmini Ernst APRN CNP 21.7 mL/hr at 03/22/25 1200 1 mcg/kg/min at 03/22/25 1200       Physical Examination:  Vitals: BP (!) 70/55   Pulse 70   Temp 98.1  F (36.7  C) (Esophageal)   Resp 16   Wt 70.3 kg (154 lb 15.7 oz)   SpO2 95%   BMI 27.45 kg/m    General: Adult female patient, lying in bed, critically-ill  HEENT: ETT in place  Cardiac: RRR on monitor   Pulm: Intubated  Abdomen: Non-distended  Extremities: Warm  Skin: No rash or lesion  Psych: Calm and cooperative  Neuro:  Mental Status: Does not follow commands but does grimace in response to noxious stimuli and opens eyes in response to noxious stimuli.  Cranial Nerves:  PERRL, gaze conjugate and midline, not blinking to threat bilaterally, face appears symmetric, intact corneals, cough, and gag   Motor:  Not moving all extremities purposefully   Reflexes:  no clonus  Sensory:   does not withdraw to noxious stimuli in all extremities  Coordination:   IGOR  Station/Gait:  deferred    Labs and Imaging:  All relevant imaging and laboratory values personally reviewed.

## 2025-03-22 NOTE — PLAN OF CARE
ICU End of Shift Summary. See flowsheets for vital signs and detailed assessment.    Changes this shift: Patient able to open eyes to voice and withdraw to pain. Pupils were reactive with NPI ranging between 3 to 4.SR with HR between 60s to 70; levo was started but able to be paused, phenyl currently at 1 mcg/kg/min. Due to MAP all over the place and not meeting goal of 65 albumin 5% was given and vasopressor was were titratable. TF was increased to 20 mL at 0200 next increase were placed as reminder on MAR. Changed dressing on inner thighs sacrum/coccyx due to multiple weeping wounds.    Plan: Titrate phenyl as tolerated to meet MAP goal of 65. Continue to monitor neuro status.     Problem: Pain Acute  Goal: Optimal Pain Control and Function  Outcome: Progressing  Intervention: Optimize Psychosocial Wellbeing  Recent Flowsheet Documentation  Taken 3/22/2025 0400 by Humberto Ogden RN  Supportive Measures:   active listening utilized   positive reinforcement provided   relaxation techniques promoted  Taken 3/22/2025 0000 by Humberto Ogden RN  Supportive Measures:   active listening utilized   positive reinforcement provided   relaxation techniques promoted  Intervention: Prevent or Manage Pain  Recent Flowsheet Documentation  Taken 3/22/2025 0400 by Humberto Ogden RN  Sensory Stimulation Regulation:   lighting decreased   care clustered  Sleep/Rest Enhancement:   family presence promoted   natural light exposure provided  Medication Review/Management:   medications reviewed   high-risk medications identified  Taken 3/22/2025 0000 by Humberto Ogden RN  Sensory Stimulation Regulation:   lighting decreased   care clustered  Sleep/Rest Enhancement:   family presence promoted   natural light exposure provided  Medication Review/Management:   medications reviewed   high-risk medications identified     Goal Outcome Evaluation:      Plan of Care Reviewed With: patient, family    Overall Patient Progress: decliningOverall Patient  Progress: declining    Outcome Evaluation: started a second pressor but able to tirtrate off; albumin added to help maintain MAP goal above 65

## 2025-03-22 NOTE — PLAN OF CARE
ICU End of Shift Summary. See flowsheets for vital signs and detailed assessment.    Changes this shift: Tube feeds stopped. MD discussed starting comfort care tomorrow. RR on vent changed to 14. 1 unit PRBCs and albumin given. CVC/art dressings changed. D10 started for hypoglycemia. Pt massively weeping globally. Pt remains on Jl.     Plan: Continue with plan of care.       Goal Outcome Evaluation:      Plan of Care Reviewed With: child    Overall Patient Progress: decliningOverall Patient Progress: declining

## 2025-03-22 NOTE — PHARMACY
Pharmacy Tube Feeding Consult    Medication reviewed for administration by feeding tube and for potential food/drug interactions.    Recommendation: No changes are needed at this time.     Pharmacy will continue to follow as new medications are ordered.      Maria Luz Louise, LaneD

## 2025-03-22 NOTE — CONSULTS
[facility]    Consult  - MIS/Bariatric Surgery Service       Date of Admission:  3/4/2025    Assessment & Plan: Surgery   Alis Hartman is a 69 year old female with history of cervical cancer s/p radiation, serous adenocarcinoma s/p SNEHA/BSO and cystotomy with suprapubic catheter placement on 7/2024 and three cycles of carbo/taxol (1-/2024), history of ESBL urosepsis and bacteremia, RNY gastric bypass, atrial fibrillation on apixaban, HTN, CKD stage 3, and multiple recent admissions for malnutrition and weakness. She subsequently had a PEA arrest x 2. On 3/21 she underwent a non-contrast CT scan on 3/21 for sepsis work up. Which revealed concern for questionable perforated marginal ulcer. MIS was consulted for further evaluation of potential perforation. CT scan scan reviewed with questionable perforation of a marginal ulcer. WBC has been downtrending and pressors are being weaned.     - No acute surgical intervention. Upon review of the scan and in conjunction with the patient's benign abdominal exam low suspicion for marginal ulcer perforation.  - Would not recommended pursuing further imaging studies given ongoing discussion with ethics and their recommendations to avoid escalating to interventions that have no reasonable likelihood of the patient surviving outside of an acute care setting  - Recommend further goals of care discussions with the family    The patient's care was discussed with the chief resident and staff.    Regla Piña DO  [facility]  All communications related to this note should be expressed to resident/THAO on call for this team at the time of your communication. Please be advised that not all members of this team utilize vocera.  ______________________________________________________________________    Chief Complaint   Concern for marginal ulcer perforation    History is obtained from the  patient    History of Present Illness   Alis Hartman is a 69 year old female with history of cervical cancer s/p radiation, serous adenocarcinoma s/p SNEHA/BSO and cystotomy with suprapubic catheter placement on 7/2024 and three cycles of carbo/taxol (1-/2024), history of ESBL urosepsis and bacteremia, RNY gastric bypass, atrial fibrillation on apixaban, HTN, CKD stage 3, and multiple recent admissions for malnutrition and weakness. She subsequently had a PEA arrest x 2. On 3/21 she underwent a non-contrast CT scan on 3/21 for sepsis work up. Which revealed concern for questionable perforated marginal ulcer. MIS was consulted for further evaluation of potential perforation.    Patient has been seen by the palliative care team and further goals of care in discussion with family. Ethics was consulted on 3/21 stating with recommendations to avoid escalating to interventions that have no reasonable likelihood of the patient surviving outside of an acute care setting. She was seen at bedside. Intubated and sedated in bed with EEG in place. WBC has been downtrending and pressors are being weaned.     Past Medical History    Past Medical History:   Diagnosis Date    Atrial fibrillation (H)     Depression     Hypertension     Malignant neoplasm of endocervix (H)     Tx with radiation    Near syncope 11/07/2024    Orthopnea 09/21/2024    Other chronic pain     Low back    Schizophrenia (H)     Urinary incontinence        Past Surgical History   Past Surgical History:   Procedure Laterality Date    ABDOMINOPLASTY      BIOPSY CERVICAL, LOCAL EXCISION, SINGLE/MULTIPLE  10/26/2011    Procedure:BIOPSY CERVICAL, LOCAL EXCISION, SINGLE/MULTIPLE; EUA, cervical biopsies; Surgeon:BETTY TINEO; Location:[facility] OR    BIOPSY VAGINAL N/A 06/08/2021    Procedure: BIOPSY, VAGINA;  Surgeon: Dany ePrez MD;  Location: [facility] OR    CYSTOSCOPY N/A 05/17/2024    Procedure: Cystoscopy;  Surgeon: Dany Perez MD;  Location: [facility] OR     CYSTOSTOMY, INSERT TUBE SUPRAPUBIC, COMBINED  07/12/2024    Procedure: Insertion of supra-pubic catheter;  Surgeon: Dany Perez MD;  Location: [facility]    DILATION AND CURETTAGE, WITH ULTRASOUND GUIDANCE N/A 05/17/2024    Procedure: Dilation and curettage of the uterus with drainage of uterine fluid under ultrasound guidance, lysis of vaginal adhesions;  Surgeon: Dany Perez MD;  Location: [facility]    EXAM UNDER ANESTHESIA PELVIC N/A 03/12/2020    Procedure: Exam under anesthsia, vaginal biopsies, possible CO2 laser of the vagina;  Surgeon: Lilliam Roy MD;  Location: [facility]    GI SURGERY  2004    gastric bypass    HYSTERECTOMY TOTAL ABDOMINAL, BILATERAL SALPINGO-OOPHORECTOMY, COMBINED Bilateral 07/12/2024    Procedure: Diagnostic laparoscopy converted to exploratory laparotomy, total abdominal hysterectomy, bilateral salpingo-oophorectomy, lysis of adhesions >60 min;  Surgeon: Dany Perez MD;  Location: [facility]    INSERT PORT VASCULAR ACCESS N/A 08/26/2024    Procedure: Insert port vascular access;  Surgeon: Avery Gregory MD;  Location: [facility]    IR CHEST PORT PLACEMENT > 5 YRS OF AGE  08/26/2024    IR FOLLOW UP VISIT OUTPATIENT  1/7/2025    IR NEPHROSTOMY TUBE PLACEMENT LEFT  11/29/2024    LASER CO2 VAGINA N/A 03/02/2015    Procedure: LASER CO2 VAGINA;  Surgeon: Mariela Abdalla MD;  Location: [facility]    LASER CO2 VAGINA N/A 05/24/2019    Procedure: Exam under anesthesia, vaginal biopsies, CO2 laser of the vagina;  Surgeon: Lilliam Roy MD;  Location: [facility]    LASER CO2 VAGINA N/A 06/08/2021    Procedure: Exam under anesthesia, laser ablation of the upper vagina;  Surgeon: Dany Perez MD;  Location: [facility]    PICC DOUBLE LUMEN PLACEMENT Left 11/28/2024    left basilic 5 fr dl power picc 44 cm    REPAIR BLADDER N/A 07/12/2024    Procedure: Repair bladder;  Surgeon: Dany Perez MD;  Location: [facility]       Prior to Admission Medications   Prior to Admission Medications  "  Prescriptions Last Dose Informant Patient Reported? Taking?   Skin Protectants, Misc. (INTERDRY 10\"X36\") SHEE  Self No Yes   Sig: Externally apply 10 each topically every 24 hours. Apply to skin folds on abdomen   acetaminophen (TYLENOL) 325 MG tablet 3/4/2025  No Yes   Sig: Take 3 tablets (975 mg) by mouth every 8 hours.   albuterol (PROAIR HFA/PROVENTIL HFA/VENTOLIN HFA) 108 (90 Base) MCG/ACT inhaler  Self Yes Yes   Sig: Inhale 1-2 puffs into the lungs every 4 hours as needed for shortness of breath   calcium Citrate-vitamin D 500-400 MG-UNIT CHEW Unknown Self Yes Yes   Sig: Take 1 tablet by mouth daily   cyanocobalamin (CYANOCOBALAMIN) 1000 MCG/ML injection Unknown Self Yes Yes   Sig: Inject 1 mL (1,000 mcg) into a muscle every month.   diclofenac (VOLTAREN) 1 % topical gel 3/3/2025  No Yes   Sig: Apply 4 g topically 4 times daily as needed for moderate pain.   ferrous sulfate (CASSANDRA-IN-SOL) 75 (15 FE) MG/ML oral drops Unknown Self Yes Yes   Sig: Take 15 mg by mouth daily   hydrocortisone 2.5 % cream  Self Yes Yes   Sig: Apply topically as needed   lidocaine (XYLOCAINE) 5 % external ointment  Self Yes Yes   Sig: Apply 1 Application topically as needed   magic mouthwash suspension, diphenhydrAMINE, lidocaine, aluminum-magnesium & simethicone, (FIRST-MOUTHWASH BLM) compounding kit 3/4/2025  No Yes   Sig: Swish and spit 10 mLs in mouth 3 times daily (before meals).   melatonin 1 MG TABS tablet 3/3/2025 Bedtime  No Yes   Sig: Take 1 tablet (1 mg) by mouth nightly as needed for sleep.   morphine 0.5 % in solosite gel   No Yes   Sig: Apply 8-16 clicks (4-8 g) topically daily as needed for pain (with dressing changes).   naloxone (NARCAN) 4 MG/0.1ML nasal spray  Self No Yes   Sig: Spray 1 spray (4 mg) into one nostril alternating nostrils as needed for opioid reversal every 2-3 minutes until assistance arrives   ondansetron (ZOFRAN) 8 MG tablet  Self No Yes   Sig: Take 1 tablet (8 mg) by mouth every 8 hours as needed " for nausea   oxyCODONE IR (ROXICODONE) 20 MG TABS immediate release tablet 3/4/2025  No Yes   Sig: Take 20 mg by mouth every 4 hours as needed for severe pain (Chronic Pain).   phenol (CHLORASEPTIC) 1.4 % spray   No Yes   Sig: Take 1-2 sprays (1-2 mLs) by mouth every hour as needed for sore throat.   polyethylene glycol (MIRALAX) 17 GM/Dose powder 3/3/2025 Self No Yes   Sig: Take 17 g by mouth daily as needed for constipation   prochlorperazine (COMPAZINE) 5 MG tablet  Self No Yes   Sig: Take 1-2 tablets (5-10 mg) by mouth every 6 hours as needed for nausea or vomiting   triamcinolone (KENALOG) 0.1 % external ointment  Self No Yes   Sig: Apply topically 3 times daily as needed for irritation.      Facility-Administered Medications: None        Review of Systems    Unable to be obtained    Social History   I have reviewed this patient's social history and updated it with pertinent information if needed.  Social History     Tobacco Use    Smoking status: Never    Smokeless tobacco: Never   Substance Use Topics    Alcohol use: Not Currently    Drug use: No         Family History   I have reviewed this patient's family history and updated it with pertinent information if needed.  Family History   Problem Relation Age of Onset    Heart Disease Father     Heart Failure Father     No Known Problems Brother     No Known Problems Brother     No Known Problems Brother     Pancreatitis Brother     Hypertension Sister     Peripheral Vascular Disease Sister     No Known Problems Sister     No Known Problems Son     No Known Problems Daughter          Allergies   Allergies   Allergen Reactions    Ibuprofen Nausea and Vomiting    Shrimp     Sulfa Antibiotics Rash     Patient started on bactrim 7/24/24 tolerating medication without rash on 7/25         Physical Exam   Vital Signs: Temp: 96.8  F (36  C) Temp src: Bladder BP: (!) 70/55 Pulse: 75   Resp: 18 SpO2: (!) 88 % O2 Device: Mechanical Ventilator    Weight: 154 lbs 15.73  oz  Intake/Output Summary (Last 24 hours) at 3/22/2025 0749  Last data filed at 3/22/2025 0700  Gross per 24 hour   Intake 2896.84 ml   Output 675 ml   Net 2221.84 ml        Gen: intubated and sedated, EEG in place  ENT: Mucous Membranes Moist  Pulm: Ventilated, no increased work of breathing  CV: RRR per monitor  Abd: Soft, non-distended, minimally tender to palpation. Suprapubic catheter is in place and draining urine  Extremities: warm and well perfused  Neuro: Sedated         Data     I have personally reviewed the following data over the past 24 hrs:    9.8  \   8.7 (L)   / 28 (LL)     138 112 (H) 18.5 /  75   4.5 13 (L) 1.07 (H) \     ALT: 34 AST: 71 (H) AP: 128 TBILI: 0.9   ALB: 2.3 (L) TOT PROTEIN: 3.7 (L) LIPASE: N/A     Procal: N/A CRP: N/A Lactic Acid: 2.0         Imaging results reviewed over the past 24 hrs:   Recent Results (from the past 24 hours)   XR Chest Port 1 View    Narrative    Examination:  XR CHEST PORT 1 VIEW    Date:  3/22/2025 10:48 AM     Clinical Information: intubated     Comparison: CT dated 3/21/2025    Findings:   Portable AP view of the chest. Stable positioning of endotracheal tube  tip in the midtrachea. Esophageal temperature probe is in the distal  esophagus. Partially visualized enteric tube. Stable positioning of  right central line tip in the SVC. Stable cardiomediastinal  silhouette. No significant change in bilateral pleural effusions,  right greater than left. Unchanged mixed bibasilar pulmonary  opacities. No discernible pneumothorax.      Impression    Impression:  1. Endotracheal tube tip terminates in the midthoracic trachea.  2. No significant change in bilateral pleural effusions, right greater  than left.  3. Unchanged mixed bibasilar pulmonary opacities, likely  edema/atelectasis.    RENNY SWANN MD         SYSTEM ID:  [id number]

## 2025-03-22 NOTE — CONSULTS
GASTROENTEROLOGY CONSULTATION      ------------------------------------------------------------------------------------------------------------------       Reason for Consultation:   C/f Gastric Ulcer perforation          ASSESSMENT AND RECOMMENDATIONS:   Assessment:  69 year old female with PMH of cervical cancer s/p radiation, serous endometrial adenocarcinoma s/p SNEHA/BSO (hysterectomy/ovarian removal) and cystotomy w/ suprapubic catheter placement (7/2024) as well as several cycles of carbo/taxel (10/2024), hx ESBL urosepsis and bacteremia, RNY gastric bypass, afib not on apixaban (stopped due to vaginal bleeding), HTN, CKD3 who was initially admitted with increased confusion, poor appetite and generalized weakness.     Hospital course complicated in recent days but CVA, respiratory failure requiring intubation, and PEA arrest x 2 following intubation. GI consulted due to CT imaging showing possible perforated marginal ulcer in the gastric pouch and question of whether endoscopy is warranted for further evaluation.      After chart review and clinical evaluation, this patient is very high risk for procedure related complications with regards to an endoscopy if there is concern for perforation. There is low clinical utility/efficacy of doing a procedure in this critically ill patient - very little we would be able to intervene upon. Additionally, no overt signs of GIB.     Plan:   - No indication for GI endoscopic evaluation, risks significantly outweigh any possible therapeutic benefit   - Recommend discussion with general surgery v bariatric surgery regarding concern for perforation  - Further cares per primary team  - Agree with ongoing [facility] discussions     Above was discussed in detail with primary team who agree with above assessment and plan.    GI Inpatient Service will sign off at this time. Please do not hesitate to reach out with any questions.     Discussed with attending GI physician   Carl Smiley MD  Gastroenterology Fellow     Attending Attestation:  I saw the patient with Dr. Smiley and agree with the findings and the plan of care as documented in the fellow's note. In summary, the patient is an 69 year old female with a history of cervical cancer s/p radiation, serous endometrial adenocarcinoma s/p SNEHA/BSO (hysterectomy/ovarian removal) and cystotomy w/ suprapubic catheter placement (7/2024) as well as several cycles of carbo/taxel (10/2024), hx ESBL urosepsis and bacteremia, RNY gastric bypass, afib not on apixaban (stopped due to vaginal bleeding), HTN, CKD3 who was initially admitted with increased confusion, poor appetite and generalized weakness.     Hospital course complicated in recent days but CVA, respiratory failure requiring intubation, and PEA arrest x 2 following intubation. GI consulted due to CT imaging showing possible perforated marginal ulcer in the gastric pouch and question of whether endoscopy is warranted for further evaluation.     The patient is significantly medically complex and critically ill in the ICU. There is no role for evaluation of a perforation endoscopically. No GI interventions planned.    60 minutes were spent on the date of the encounter performing chart review, reviewing of outside and inside available records, review of test results as well as interpretation of tests, the patient visit, documentation, and discussion with other provider(s) and/or discussion with family.     David Henry DO   of Medicine  Director, Esophageal Disorders Program  Director of Endoscopy, [facility]  Division of Gastroenterology, Hepatology, and Nutrition  [facility]             History of Present Illness:     Alis Hartman is a 69 year old female with PMH of cervical cancer s/p radiation, serous endometrial adenocarcinoma s/p SNEHA/BSO (hysterectomy/ovarian removal) and cystotomy w/  suprapubic catheter placement (7/2024) as well as several cycles of carbo/taxel (10/2024), hx ESBL urosepsis and bacteremia, RNY gastric bypass, afib not on apixaban (stopped due to vaginal bleeding), HTN, CKD3 who was initially admitted with increased confusion, poor appetite and generalized weakness.     Hospital course complicated in recent days but CVA, respiratory failure requiring intubation, and PEA arrest x 2 following intubation. GI consulted due to CT imaging showing possible perforated marginal ulcer in the gastric pouch and question of whether endoscopy is warranted for further evaluation.     History limited due to the patient's critically ill status.              Past Medical History:   Reviewed and edited as appropriate  Past Medical History:   Diagnosis Date    Atrial fibrillation (H)     Depression     Hypertension     Malignant neoplasm of endocervix (H)     Tx with radiation    Near syncope 11/07/2024    Orthopnea 09/21/2024    Other chronic pain     Low back    Schizophrenia (H)     Urinary incontinence             Past Surgical History:   Reviewed and edited as appropriate   Past Surgical History:   Procedure Laterality Date    ABDOMINOPLASTY      BIOPSY CERVICAL, LOCAL EXCISION, SINGLE/MULTIPLE  10/26/2011    Procedure:BIOPSY CERVICAL, LOCAL EXCISION, SINGLE/MULTIPLE; EUA, cervical biopsies; Surgeon:BETTY TINEO; Location:[facility] OR    BIOPSY VAGINAL N/A 06/08/2021    Procedure: BIOPSY, VAGINA;  Surgeon: Dany Perez MD;  Location: [facility] OR    CYSTOSCOPY N/A 05/17/2024    Procedure: Cystoscopy;  Surgeon: Dany Perez MD;  Location: [facility]    CYSTOSTOMY, INSERT TUBE SUPRAPUBIC, COMBINED  07/12/2024    Procedure: Insertion of supra-pubic catheter;  Surgeon: Dany Perez MD;  [facility]: [facility]    DILATION AND CURETTAGE, WITH ULTRASOUND GUIDANCE N/A 05/17/2024    Procedure: Dilation and curettage of the uterus with drainage of uterine fluid under ultrasound guidance, lysis of vaginal adhesions;   Surgeon: Dany Perez MD;  Location: [facility]    EXAM UNDER ANESTHESIA PELVIC N/A 03/12/2020    Procedure: Exam under anesthsia, vaginal biopsies, possible CO2 laser of the vagina;  Surgeon: Lilliam Roy MD;  [facility]    GI SURGERY  2004    gastric bypass    HYSTERECTOMY TOTAL ABDOMINAL, BILATERAL SALPINGO-OOPHORECTOMY, COMBINED Bilateral 07/12/2024    Procedure: Diagnostic laparoscopy converted to exploratory laparotomy, total abdominal hysterectomy, bilateral salpingo-oophorectomy, lysis of adhesions >60 min;  Surgeon: Dany Perez MD;  [facility]    INSERT PORT VASCULAR ACCESS N/A 08/26/2024    Procedure: Insert port vascular access;  Surgeon: Avery Gregory MD;  [facility]    IR CHEST PORT PLACEMENT > 5 YRS OF AGE  08/26/2024    IR FOLLOW UP VISIT OUTPATIENT  1/7/2025    IR NEPHROSTOMY TUBE PLACEMENT LEFT  11/29/2024    LASER CO2 VAGINA N/A 03/02/2015    Procedure: LASER CO2 VAGINA;  Surgeon: Mariela Abdalla MD;  Location: [facility]    LASER CO2 VAGINA N/A 05/24/2019    Procedure: Exam under anesthesia, vaginal biopsies, CO2 laser of the vagina;  Surgeon: Lilliam Roy MD;  Location: [facility]    LASER CO2 VAGINA N/A 06/08/2021    Procedure: Exam under anesthesia, laser ablation of the upper vagina;  Surgeon: Dany Perez MD;  Location: [facility]    PICC DOUBLE LUMEN PLACEMENT Left 11/28/2024    left basilic 5 fr dl power picc 44 cm    REPAIR BLADDER N/A 07/12/2024    Procedure: Repair bladder;  Surgeon: Dany Perez MD;  Location: [facility]              Social History:     Social History     Tobacco Use    Smoking status: Never    Smokeless tobacco: Never   Substance Use Topics    Alcohol use: Not Currently    Drug use: No              Family History:   Reviewed and edited as appropriate  Family History   Problem Relation Age of Onset    Heart Disease Father     Heart Failure Father     No Known Problems Brother     No Known Problems Brother     No Known Problems  Brother     Pancreatitis Brother     Hypertension Sister     Peripheral Vascular Disease Sister     No Known Problems Sister     No Known Problems Son     No Known Problems Daughter              Allergies:   Reviewed and edited as appropriate     Allergies   Allergen Reactions    Ibuprofen Nausea and Vomiting    Shrimp     Sulfa Antibiotics Rash     Patient started on bactrim 7/24/24 tolerating medication without rash on 7/25             Medications:     Medications Prior to Admission   Medication Sig Dispense Refill Last Dose/Taking    acetaminophen (TYLENOL) 325 MG tablet Take 3 tablets (975 mg) by mouth every 8 hours.   3/4/2025    albuterol (PROAIR HFA/PROVENTIL HFA/VENTOLIN HFA) 108 (90 Base) MCG/ACT inhaler Inhale 1-2 puffs into the lungs every 4 hours as needed for shortness of breath   Taking As Needed    calcium Citrate-vitamin D 500-400 MG-UNIT CHEW Take 1 tablet by mouth daily   Unknown    cyanocobalamin (CYANOCOBALAMIN) 1000 MCG/ML injection Inject 1 mL (1,000 mcg) into a muscle every month.   Unknown    diclofenac (VOLTAREN) 1 % topical gel Apply 4 g topically 4 times daily as needed for moderate pain. 50 g 0 3/3/2025    ferrous sulfate (CASSANDRA-IN-SOL) 75 (15 FE) MG/ML oral drops Take 15 mg by mouth daily   Unknown    hydrocortisone 2.5 % cream Apply topically as needed   Taking As Needed    lidocaine (XYLOCAINE) 5 % external ointment Apply 1 Application topically as needed   Taking As Needed    magic mouthwash suspension, diphenhydrAMINE, lidocaine, aluminum-magnesium & simethicone, (FIRST-MOUTHWASH BLM) compounding kit Swish and spit 10 mLs in mouth 3 times daily (before meals). 119 mL 1 3/4/2025    melatonin 1 MG TABS tablet Take 1 tablet (1 mg) by mouth nightly as needed for sleep. 30 tablet 1 3/3/2025 Bedtime    morphine 0.5 % in solosite gel Apply 8-16 clicks (4-8 g) topically daily as needed for pain (with dressing changes). 40 g 0 Taking As Needed    naloxone (NARCAN) 4 MG/0.1ML nasal spray Spray 1  "spray (4 mg) into one nostril alternating nostrils as needed for opioid reversal every 2-3 minutes until assistance arrives 2 each 0 Taking As Needed    ondansetron (ZOFRAN) 8 MG tablet Take 1 tablet (8 mg) by mouth every 8 hours as needed for nausea 20 tablet 0 Taking As Needed    oxyCODONE IR (ROXICODONE) 20 MG TABS immediate release tablet Take 20 mg by mouth every 4 hours as needed for severe pain (Chronic Pain). 30 tablet 0 3/4/2025    phenol (CHLORASEPTIC) 1.4 % spray Take 1-2 sprays (1-2 mLs) by mouth every hour as needed for sore throat. 118 mL 3 Taking As Needed    polyethylene glycol (MIRALAX) 17 GM/Dose powder Take 17 g by mouth daily as needed for constipation 510 g 0 3/3/2025    prochlorperazine (COMPAZINE) 5 MG tablet Take 1-2 tablets (5-10 mg) by mouth every 6 hours as needed for nausea or vomiting 40 tablet 0 Taking As Needed    Skin Protectants, Misc. (INTERDRY 10\"X36\") SHEE Externally apply 10 each topically every 24 hours. Apply to skin folds on abdomen 10 each 1 Taking    triamcinolone (KENALOG) 0.1 % external ointment Apply topically 3 times daily as needed for irritation. 30 g 1 Taking As Needed             Review of Systems:   ROS unable to be performed due to patient's critically ill status.          Physical Exam:   Temp: (!) 96.1  F (35.6  C) Temp src: Esophageal BP: (!) 70/55 Pulse: 67   Resp: 16 SpO2: (!) 87 % O2 Device: Mechanical Ventilator    Wt:   Wt Readings from Last 2 Encounters:   03/22/25 70.3 kg (154 lb 15.7 oz)   02/20/25 60.4 kg (133 lb 2.5 oz)      General: Critically ill female.   HEENT: Head is AT/NC.   Neck: Sedated  Lungs: Mechanically ventilated   Heart: RRR  Abdomen: Soft  Extremities: No pedal edema.     MSK: Sedate  Skin: No jaundice   Neurologic: Sedate           Data:   Labs and imaging below were independently reviewed and interpreted    LAB WORK:    Results for orders placed or performed during the hospital encounter of 03/04/25 (from the past 24 hours)   CTA Head " Neck with Contrast    Narrative    CT angiogram of the head with contrast  Head CT without contrast    History:  AMS, tremors, follow up from code stroke last night.  Comparison:  CT 3/21/2025, 7/29/2024      Technique: Initial noncontrast images from the skull base to the  vertex were obtained, and reviewed in bone, brain, and subdural  windows.    HEAD and NECK CTA: During rapid bolus intravenous injection of  nonionic contrast material, axial images were obtained using thin  collimation multidetector helical technique from the base of the upper  aortic arch through the [place]. This CT angiogram data was  reconstructed at thin intervals with mild overlap. Images were sent to  the [employer] workstation, and 3D reconstructions were obtained. The  axial source images, multiplanar reformations, 3D reconstructions in  both maximum intensity projection display and volume rendered models  were reviewed, with reconstructions performed by the technologist and  the radiologist    Findings:    Noncontrast head CT:  No acute intracranial hemorrhage, mass effect, or midline shift. No  acute loss of gray-white matter differentiation in the cerebral  hemispheres. Ventricles are proportionate to the cerebral sulci. Clear  basal cisterns. Mild to moderate generalized parenchymal atrophy.  Patchy hypoattenuation of the periventricular and subcortical white  matter which is nonspecific but likely representative of chronic small  vessel ischemic disease given patient's age    The bony calvaria and the bones of the skull base are normal. Trace  mucosal thickening of the paranasal sinuses. The mastoid air cells are  clear. Grossly normal orbits.     Head CTA demonstrates no definite aneurysm or stenosis of the major  intracranial arteries. The anterior communicating artery is patent.  Hypoplastic A1 segment of the right anterior cerebral artery. Fetal  origin of the right posterior cerebral artery.    Neck CTA demonstrates no  stenosis of the major cervical arteries. The  origins of the great vessels from the aortic arch are patent. The  normal distal right internal carotid artery measures 5 mm. The normal  distal left internal carotid artery measures 5 mm.     No mass within the visualized portions of the cervical soft tissues.  Diffuse body wall anasarca. Large bilateral pleural effusions with  mild overlying compressive atelectasis.       Impression    Impression:    1. No acute intracranial pathology on the noncontrast head CT.  2. Head CTA demonstrates no definite aneurysm or stenosis of the major  intracranial arteries.   3. Neck CTA demonstrates no stenosis of the major cervical arteries.  4. Large bilateral pleural effusions.  5. Diffuse body wall anasarca.      I have personally reviewed the examination and initial interpretation  and I agree with the findings.    RONALD MONTEZ MD         SYSTEM ID:  [id number]   CT Chest Pulmonary Embolism w Contrast    Narrative    CT chest pulmonary angiogram with contrast    INDICATION: Concern for pulmonary embolus. Concern for sepsis. Looking  for source of infection.    CONTRAST: 221 mL intravenous Isovue-370. Patient also underwent head  CT today and abdomen pelvis CT today.    COMPARISON: Most recent chest CT 11/27/2024    FINDINGS: Thyroid appears enlarged. Prominent pleural effusions  bilaterally the paranasal asymmetric. Associated atelectasis. Heart  size normal. No obvious pericardial effusion. Thoracic aorta normal  caliber. Pulmonary artery system well opacified with contrast. The  main pulmonary artery caliber was normal approximately 2.5 cm.  There is mild atherosclerotic calcification in the thoracic aorta. No  hypodense filling defect or wall-appearing hypodensity to indicate  obvious acute or chronic pulmonary and most. No weblike deformities of  the pulmonary artery system. As another possible secondary sign of  pulmonary embolus. Body wall edema suggestive of anasarca.  Catheter  tip SVC. No right heart enlargement.  Endotracheal tube tip approximately 2.1 cm above the sathya  The aerated portion of the lungs shows no nodule. No pneumothorax. No  pneumomediastinum. No pneumoperitoneum.  The included upper abdomen again is incompletely imaged comment please  review separate abdomen and pelvis CT for further detail. Left adrenal  does appear somewhat hyperplastic on this incomplete imaging set.  Nodules lateral to the right kidney and medial to the inferior right  hepatic lobe, there is a incompletely imaged soft tissue density which  on the abdomen CT appears to represent loop of bowel.  Bone detail shows osteopenia. Mild degenerative changes in the  thoracic spine. Degenerative changes of the left shoulder glenohumeral  joint with joint space narrowing.      Impression    IMPRESSION:  1. No CT evidence of pulmonary embolus.  2. Large bilateral pleural effusions with associated atelectasis.  3. Intubated.  4. Aortic atherosclerosis.    SHAYNA ADKINS MD         SYSTEM ID:  [id number]   CT Abdomen Pelvis w Contrast    Narrative    EXAM: CT ABDOMEN PELVIS W CONTRAST 3/21/2025 4:08 PM    HISTORY: 69 years Female c/f sepsis, looking for sources of infection.    COMPARISON: CT abdomen/pelvis 3/17/2025, 11/7/2024    TECHNIQUE: Axial CT images from the lung bases through the symphysis  pubis were obtained with IV contrast. Coronal and sagittal reformats  provided. Contrast dose: 154  ml Isovue 370.    FINDINGS:    LINES/TUBES: Suprapubic catheter terminates in the bladder.    LUNG BASES: Small/moderate bilateral pleural effusions with associated  compressive atelectasis.    HEPATIC: No suspicious focal hepatic masses or lesions. Subcentimeter  hypodensity in the left lobe of liver is too small to characterize,  likely a simple cyst.    BILIARY: Distended gallbladder. No calcified gallstones. No intra or  extrahepatic biliary dilatation. Small volume pericholecystic fluid.    SPLEEN:  Normal size. No focal lesions.    PANCREAS: No mass or peripancreatic fluid.    ADRENALS: Unremarkable.    RENAL: No hydronephrosis, calculi, or mass. Bilateral renal cortical  scarring, with several areas of wedge-shaped hypoattenuation in the  left kidney, likely secondary to prior vascular insults.    URINARY BLADDER: Suprapubic catheter in place. Contrast media within  the lumen..    PELVIC ORGANS: No pelvic masses.    GASTROINTESTINAL: Postoperative changes of gastric bypass. Question  possible perforated marginal ulcer of the anterior aspect of the  gastric pouch (series 4 image 72, series 7 image 73). Surrounding  fluid and stranding, though potentially confounded by extensive  mesenteric edema. Normal caliber large and small bowel. Rectal wall  thickening with somewhat lobulated contour.    MESENTERY/PERITONEUM: Small volume intra-abdominal ascites, with  diffuse mesenteric edema.    LYMPH NODES: No lymphadenopathy.    VASCULAR: Major abdominal vessels appear patent. Mild atherosclerotic  vascular calcifications.    MUSCULOSKELETAL: Degenerative changes throughout the spine, hips,  sacroiliac joints. No suspicious bony lesions. No acute osseous  abnormality. Diffuse muscular atrophy. Extensive anasarca. Moderate to  large fat-containing right inguinal hernia.      Impression    IMPRESSION:   1.  Questionable discontinuity of the anterior aspect of the gastric  pouch, concerning for perforated marginal ulcer. Consider further  evaluation with endoscopy.  2.  Diffuse rectal wall thickening, which may represent nonspecific  proctitis or mucosal edema secondary to fluid resuscitation.  3.  Sequela of volume overload including bilateral pleural effusions,  ascites, extensive mesenteric edema, and diffuse anasarca.  4.  Additional chronic and incidental findings as described in the  body of the report.    I have personally reviewed the examination and initial interpretation  and I agree with the findings.    SILVIA  DO ALEJANDRO         SYSTEM ID:  [id number]   XR Feeding Tube Placement (for bedside placement contraindication)    Narrative    EXAM: XR FEEDING TUBE PLACEMENT 3/21/2025 4:32 PM    COMPARISON: CT abdomen/pelvis 3/21/2025    HISTORY: 69 years Female kiko en y anatomy    Fluoroscopy time: 1 minutes.    TECHNIQUE: After injection of Xylocaine gel into the right nostril, a  feeding tube was advanced under fluoroscopic guidance.    FINDINGS: Patient has a history of Kiko-en-Y gastric bypass. The  feeding tube was advanced with the tip in the jejunum. A small amount  of barium was injected to define anatomy and demonstrate placement  within the small bowel. Feeding tube was then flushed with normal  saline. The feeding tube was secured using a nasal bridle device.      Impression    IMPRESSION: Uncomplicated feeding tube placement with tip in the  jejunum.    I, SILVIA ALLEN DO, attest that I was immediately available to provide  guidance and assistance during the entirety of the procedure.    I have personally reviewed the examination and initial interpretation  and I agree with the findings.    SILVIA ALLEN DO         SYSTEM ID:  [id number]   Glucose by meter   Result Value Ref Range    GLUCOSE BY METER POCT 139 (H) 70 - 99 mg/dL   Glucose by meter   Result Value Ref Range    GLUCOSE BY METER POCT 117 (H) 70 - 99 mg/dL   Lactic acid whole blood   Result Value Ref Range    Lactic Acid 3.2 (H) 0.7 - 2.0 mmol/L   Comprehensive metabolic panel   Result Value Ref Range    Sodium 136 135 - 145 mmol/L    Potassium 4.0 3.4 - 5.3 mmol/L    Carbon Dioxide (CO2) 14 (L) 22 - 29 mmol/L    Anion Gap 11 7 - 15 mmol/L    Urea Nitrogen 16.9 8.0 - 23.0 mg/dL    Creatinine 0.91 0.51 - 0.95 mg/dL    GFR Estimate 67 >60 mL/min/1.73m2    Calcium 7.4 (L) 8.8 - 10.4 mg/dL    Chloride 111 (H) 98 - 107 mmol/L    Glucose 157 (H) 70 - 99 mg/dL    Alkaline Phosphatase 101 40 - 150 U/L    AST 57 (H) 0 - 45 U/L    ALT 28 0 - 50 U/L    Protein Total 3.2  (L) 6.4 - 8.3 g/dL    Albumin 2.2 (L) 3.5 - 5.2 g/dL    Bilirubin Total 0.8 <=1.2 mg/dL   INR   Result Value Ref Range    INR 2.27 (H) 0.85 - 1.15   Partial thromboplastin time   Result Value Ref Range    aPTT 67 (H) 22 - 38 Seconds   Fibrinogen activity   Result Value Ref Range    Fibrinogen Activity 143 (L) 170 - 510 mg/dL   Glucose by meter   Result Value Ref Range    GLUCOSE BY METER POCT 128 (H) 70 - 99 mg/dL   Lactic acid whole blood   Result Value Ref Range    Lactic Acid 2.5 (H) 0.7 - 2.0 mmol/L   Glucose by meter   Result Value Ref Range    GLUCOSE BY METER POCT 12 (LL) 70 - 99 mg/dL   Glucose by meter   Result Value Ref Range    GLUCOSE BY METER POCT 86 70 - 99 mg/dL   EEG Video 12-26 hr Unmonitored    Narrative    EEG Video 12-26 hr Unmonitored Result    VIDEO EEG DATE: 3/21/25  VIDEO EEG [id number]  VIDEO EEG DAY#: 1  VIDEO EEG SOURCE FILE DURATION: 14 hours and 32 min    PATIENT INFORMATION: Alis Hartman is a 69 year old female with   history of cervical cancer s/p radiation, serous endometrial   adenocarcinoma s/p SNEHA/BSO and cystotomy w/ suprapubic catheter placement   (2024) as well as several cycles of carbo/taxel (10/01264), hx ESBL   urosepsis and bacteremia, RNY gastric bypass, afib not on anticoagulation   (stopped due to vaginal bleeding), HTN, CKD3 who was admitted with   increased confusion, poor appetite, and generalized weakness on 2025.   A stroke code was called for left gaze deviation on 3/20/25. EEG is being   done to evaluate for seizures.     MEDICATIONS:   Keppra 750mg bid   Thiamine   These medications and doses were derived from the medical record at the   time of this procedure.    TECHNICAL SUMMARY: EEG was recorded from 23 scalp electrodes placed   according to the 10-20 international system. Additional electrodes were   used for referencing, EKG, and to record from other cerebral regions as   appropriate. Video was continuously recorded and was reviewed for  clinical   correlation. Electrodes were attached and both video and EEG were   monitored and annotated by qualified EEG technologists. Video-EEG was   reviewed and report generated by qualified physician.    BACKGROUND ACTIVITY: No well-organized posterior dominant rhythm was   observed.  Background consisted of predominantly 1 to 4 Hz polymorphic   delta slowing with intermittent superimposed faster activities.  There   were brief intermittent generalized suppression of the background lasting   1 to 2 seconds.  No well-organized sleep architecture was observed.    ACTIVATION PROCEDURE: Was performed at 1326.  Patient withdrew to painful   stimulation to left hand and began to move right hand spontaneously.    There was increased faster activities during stimulation.    INTERICTAL EPILEPTIFORM DISCHARGES: No epileptiform discharges were   present.    ICTAL: No clinical events or electrographic seizures were recorded. Video   was reviewed intermittently by EEG technologist and physician for clinical   seizures.     IMPRESSION OF VIDEO EEG DAY # 1: This video EEG is abnormal due to the   presence of diffuse predominantly delta slowing and intermittent   generalized suppression of the background, consistent with severe diffuse   nonspecific encephalopathy.  No epileptiform discharges or electrographic   seizures were recorded.      Elizabeth Sevilla MD  EPILEPSY STAFF    Glucose by meter   Result Value Ref Range    GLUCOSE BY METER POCT 83 70 - 99 mg/dL   Blood gas arterial   Result Value Ref Range    pH Arterial 7.46 (H) 7.35 - 7.45    pCO2 Arterial 21 (L) 35 - 45 mm Hg    pO2 Arterial 229 (H) 80 - 105 mm Hg    FIO2 50     Bicarbonate Arterial 15 (L) 21 - 28 mmol/L    Base Excess/Deficit Arterial -8.3 (L) -3.0 - 3.0 mmol/L    Aly's Test Artline     Oxyhemoglobin Arterial 99 92 - 100 %    O2 Sat, Arterial >100.0 (H) 95.0 - 96.0 %    Peep 5 cm H2O    Narrative    In healthy individuals, oxyhemoglobin (O2Hb) and  oxygen saturation (SO2) are approximately equal. In the presence of dyshemoglobins, oxyhemoglobin can be considerably lower than oxygen saturation.   Lactic acid whole blood   Result Value Ref Range    Lactic Acid 3.4 (H) 0.7 - 2.0 mmol/L   Glucose by meter   Result Value Ref Range    GLUCOSE BY METER POCT 85 70 - 99 mg/dL   CBC with Platelets & Differential    Narrative    The following orders were created for panel order CBC with Platelets & Differential.  Procedure                               Abnormality         Status                     ---------                               -----------         ------                     CBC with platelets and ...[[id number]]  Abnormal            Final result               RBC and Platelet Morpho...[[id number]]  Abnormal            Final result                 Please view results for these tests on the individual orders.   Magnesium   Result Value Ref Range    Magnesium 2.2 1.7 - 2.3 mg/dL   Phosphorus   Result Value Ref Range    Phosphorus 3.2 2.5 - 4.5 mg/dL   Lactic acid whole blood   Result Value Ref Range    Lactic Acid 3.1 (H) 0.7 - 2.0 mmol/L   Comprehensive metabolic panel   Result Value Ref Range    Sodium 138 135 - 145 mmol/L    Potassium 4.5 3.4 - 5.3 mmol/L    Carbon Dioxide (CO2) 13 (L) 22 - 29 mmol/L    Anion Gap 13 7 - 15 mmol/L    Urea Nitrogen 18.5 8.0 - 23.0 mg/dL    Creatinine 1.07 (H) 0.51 - 0.95 mg/dL    GFR Estimate 55 (L) >60 mL/min/1.73m2    Calcium 7.1 (L) 8.8 - 10.4 mg/dL    Chloride 112 (H) 98 - 107 mmol/L    Glucose 99 70 - 99 mg/dL    Alkaline Phosphatase 128 40 - 150 U/L    AST 71 (H) 0 - 45 U/L    ALT 34 0 - 50 U/L    Protein Total 3.7 (L) 6.4 - 8.3 g/dL    Albumin 2.3 (L) 3.5 - 5.2 g/dL    Bilirubin Total 0.9 <=1.2 mg/dL   CBC with platelets and differential   Result Value Ref Range    WBC Count 10.2 4.0 - 11.0 10e3/uL    RBC Count 2.38 (L) 3.80 - 5.20 10e6/uL    Hemoglobin 7.3 (L) 11.7 - 15.7 g/dL    Hematocrit 22.0 (L) 35.0 - 47.0 %    MCV  92 78 - 100 fL    MCH 30.7 26.5 - 33.0 pg    MCHC 33.2 31.5 - 36.5 g/dL    RDW 26.8 (H) 10.0 - 15.0 %    Platelet Count 30 (LL) 150 - 450 10e3/uL    % Neutrophils 81 %    % Lymphocytes 11 %    % Monocytes 5 %    % Eosinophils 0 %    % Basophils 0 %    % Immature Granulocytes 3 %    NRBCs per 100 WBC 1 (H) <1 /100    Absolute Neutrophils 8.3 1.6 - 8.3 10e3/uL    Absolute Lymphocytes 1.1 0.8 - 5.3 10e3/uL    Absolute Monocytes 0.5 0.0 - 1.3 10e3/uL    Absolute Eosinophils 0.0 0.0 - 0.7 10e3/uL    Absolute Basophils 0.0 0.0 - 0.2 10e3/uL    Absolute Immature Granulocytes 0.3 <=0.4 10e3/uL    Absolute NRBCs 0.1 10e3/uL   RBC and Platelet Morphology   Result Value Ref Range    RBC Morphology Confirmed RBC Indices     Platelet Assessment  Automated Count Confirmed. Platelet morphology is normal.     Automated Count Confirmed. Platelet morphology is normal.    Aden Cells Moderate (A) None Seen   Lactic acid whole blood   Result Value Ref Range    Lactic Acid 2.4 (H) 0.7 - 2.0 mmol/L    Hemoglobin 6.2 (LL) 11.7 - 15.7 g/dL   Ionized Calcium   Result Value Ref Range    Calcium Ionized Whole Blood 4.4 4.4 - 5.2 mg/dL   Prepare red blood cells (unit)   Result Value Ref Range    Blood Component Type Red Blood Cells     Product Code [id number]     Unit Status Transfused     Unit Number [id number]     CROSSMATCH Compatible     CODING SYSTEM [id number]     ISSUE DATE AND TIME [id number]     UNIT ABO/RH O+     UNIT TYPE ISBT 5100    XR Chest Port 1 View    Narrative    Examination:  XR CHEST PORT 1 VIEW    Date:  3/22/2025 10:48 AM     Clinical Information: intubated     Comparison: CT dated 3/21/2025    Findings:   Portable AP view of the chest. Stable positioning of endotracheal tube  tip in the midtrachea. Esophageal temperature probe is in the distal  esophagus. Partially visualized enteric tube. Stable positioning of  right central line tip in the SVC. Stable cardiomediastinal  silhouette. No significant change in bilateral  pleural effusions,  right greater than left. Unchanged mixed bibasilar pulmonary  opacities. No discernible pneumothorax.      Impression    Impression:  1. Endotracheal tube tip terminates in the midthoracic trachea.  2. No significant change in bilateral pleural effusions, right greater  than left.  3. Unchanged mixed bibasilar pulmonary opacities, likely  edema/atelectasis.    RENNY SWANN MD         SYSTEM ID:  [id number]   Glucose by meter   Result Value Ref Range    GLUCOSE BY METER POCT 56 (L) 70 - 99 mg/dL         =======================================================================

## 2025-03-22 NOTE — PROGRESS NOTES
Gyn Onc Attending Note   I have tried multiple times to see Aranza and her family since transfer to the MICU, but I unfortunately have not been by when her family was present. I merely wanted to offer my support having been a care provider for Aranza for several years. Please do not hesitate to reach out directly to our team (Gyn Onc inpatient pager [phone]) or to me directly ([phone]) with any questions. As has previously been noted, she has been without evidence of cancer since her treatment but has had progressive decline over the last 6 mos since completing cancer therapy. Our team will follow peripherally but appreciate the care of Aranza at this challenging time.    Dany Perez MD  Gynecologic Oncology

## 2025-03-22 NOTE — PROGRESS NOTES
Medical ICU PROGRESS NOTE  03/22/2025      Date of Service (when I saw the patient): 03/22/2025    ASSESSMENT: Alis Hartman is a 69 year old female with PMH of cervical cancer s/p radiation, serous endometrial adenocarcinoma s/p SNEHA/BSO (hysterectomy/ovarian removal) and cystotomy w/ suprapubic catheter placement (7/2024) as well as several cycles of carbo/taxel (10/2024), hx ESBL urosepsis and bacteremia, RNY gastric bypass, afib not on apixaban (stopped due to vaginal bleeding), HTN, CKD3 who was initially admitted with increased confusion, poor appetite and generalized weakness.  Her Hospital course complicated by MADHU, hypervolemia, acute on chronic pain, suspected dementia, and decreased oral intake with severe malnutrition on 3/20 there was plan for discharge with home hospice with continuation of medical cares to maximize patient's remaining time.  Patient began requiring higher level of oxygen support that was unable to be met by BiPAP on the evening of 3/20 and a rapid was called and after extensive discussion with family patient was intubated for airway protection. Patient had PEA arrest x2 following intubation. Now with concern for gastric ulcer perforation.    CHANGES and MAJOR THINGS TODAY:   - Goals of care discussions ongoing w/ family  - Transfuse pRBC as needed    PLAN:    Neuro:  # Pain and sedation  -IV propofol  -IV fentanyl  - RASS goal 0 to -1  - buprenophine patch on hold     # C/f CVA  # C/f seizures  # New left eye deviation  # small age indeterminate (possibly recent) lacunar infarcts in bilateral thalami  # bilateral tremors of upper extremities  Patient with code stroke called due to acute mental status change and new left eye gaze deviation along with bilateral upper extremity tremors R>L.  Patient was unable to initially receive head CT due to increasing oxygen requirements. She then had a PEA arrest. Did start some head imaging, however then coded a second time during the  scan.  -CT head w/o: small age indeterminate (possibly recent) lacunar infarcts in bilateral thalami, no hemorrhage or mass effect  -CT head perfusion w/ contrast: not able to be completed  -CTA head and neck when stable: poor study, patient was coding during scan  -Received 1x Ativan 4 mg  -Received Keppra loading dose  -Neurology consulted with initial recs being:               - S/p keppra 3000mg IV load  - no further workup at this time pending GOC  - agree with ongoing GOC conversations with family  -Neurocrit is consulted to assess for seizures, EEG, and to recommend if we should get any further head imaging.   - Moderately diffuse nonspecific encephalopathy on EEG    # Hypothermia  Possible due to shock, post cardiac arrest. Could be related to other etiology such as sepsis.   - has bare hugger on  - cortisol and TSH added on       # Mixed etiology cognitive impairment: contributions from delirium, metabolic encephalopathy, dementia, schizophrenia   Patient initially presenting with confusion.  Has history of paranoia with suspected hallucinations.  Has also had a general decline with refusing to eat and poor nutrition for the past several days.  Patient's encephalopathy likely multifactorial given her poor oral intake, psychiatric history, and dementia component.  Feeding tube attempted on 3/14, but unable to be placed due to disorientation and difficulty with laying flat.  -Start IV thiamine   -Continue D10 fluids for now, nutrition plan as below  - zyprexa ordered earlier this admission, has been being held, unclear if patient was refusing or unable to take. Currently is intubated.      # Acute on Chronic Pain  Thigh, low back pain related to her coccyx.  Patient was started on buprenorphine patch.  Palliative following.  -Hold buprenorphine patch  -will monitor for signs of pain, currently would be ideal to not sedate patient to allow for an accurate neurological exam     Pulmonary:  # Acute hypoxemia  respiratory failure  Patient with increasing acute hypoxemia on BiPAP requiring intubation. Etiology remains unclear. Could consider PE as CXR was relatively unremarkable. Bedside POCUS not strongly convincing for right heart strain. CT chest shows no PE, but large bilateral pleural effusions present.  - vent bundle    FiO2 (%): 40 %, Resp: 18, Vent Mode: CMV/AC, Resp Rate (Set): 16 breaths/min, Tidal Volume (Set, mL): 420 mL, PEEP (cm H2O): 5 cmH2O, Resp Rate (Set): 16 breaths/min, Tidal Volume (Set, mL): 420 mL, PEEP (cm H2O): 5 cmH2O    Cardiovascular:  # Shock, likely multifactorial, functional outflow tract obstruction noted on bedside POCUS in setting of hypovolemia, possible septic component as well  Patient requiring pressor support.  Had received fluid boluses during rapid response.  Bedside POCUS on 3/21 showed functional outflow tract obstruction in setting of hypovolemia  - Continue abx as discussed below  - formal TTE on 3/21 (done after an additional 1L of LR and 500 ml albumin) showed global and regional left ventricular function is normal with an EF of 60-65%, thickening of anterobasal septal is present, left ventricular filling pressures are increased. Global right ventricular function is normal, Mild to moderate tricuspid insufficiency is present. The right ventricular systolic pressure is 31mmHg above the right atrial pressure.  - Discontinued epinephrine, switched to phenylephrine, is still on levophed with plan to wean off levophed before phenylephrine.   - MAP goal >65     # PEA arrest x2  Patient with PEA arrest that occurred after intubation and once while in the CT scanner.  Patient achieved ROSC after several rounds of CPR. Suspect arrest was related to hypoxia and functional outflow tract obstruction.   -Continue GOC discussion as discussed below     # Elevated troponin   Elevated troponins, likely in the setting of demand ischemia. ECG   -Trend troponin to  peak   - no wall motion  abnormalities noted on TTE report     # Hx of Afib   Rate controlled. Prior admission had risk/benefit discussion and discontinued AC due to vaginal bleeding.         GI/Nutrition:  #C/f gastric ulcer perforation  3/21 CT shows discontinuity of anterior aspect of the gastric pouch, concerning for perforated marginal ulcer. Patient has had a 2 gm Hgb drop within the past day. Discussed case with GI, who does not recommend EGD due to high risk and no efficacy/clinical utility. Per General surgery, patient is not a surgical candidate given their acute illness. Discussed with family and it was agreed to transfuse blood, but not escalate cares given overall clinical picture.  - Hgb checks Q8h, transfuse Hgb<7  - Current antibiotic coverage is sufficient     # Severe Protein-Calorie Malnutrition   # Decreased oral intake/Refusal to eat   # Hypoglycemia  # Hx Kiko en y  # Hypoalbuminemia  Patient has been refusing to eat, has not had good nutrition for several days. Calorie counts 3/11 to 3/15 showed 0 intake.   - Continue D10, is now intubated, will plan to start tube feeds, have consulted IR to place feeding tube  - Nutrition consulted, appreciate assistance  - IV PPI      Renal/Fluids/Electrolytes:  #MADHU, oliguric, likely secondary to poor oral intake/malnutrition, renal labs currently stable  #Anasarca  #Dependent facial edema  # Hypokalemia  Creatinine at baseline 0.8-0.9. Renal US normal.  - Nephrology consulted this admission, signed off 3/16  - Lymphedema consulted, appreciate assistance  - Strict I's and O's, daily weights  - Monitor BMP     - RN managed electrolyte replacements     Endocrine:  # Hypoglycemia   Previously patient has been hypoglycemic likely due to poor oral intake  -D10 fluids as above      ID:  # Pyuria, VRE in urine culture   # Hx of ESBL urosepsis and bacteremia  # Bladder dysfunction s/p cystostomy and suprapubic catheter  Recently admitted 11/26 - 12/8/2024 for ESBL urosepsis and bacteremia  requiring ICU w/ left nephrostomy tube (removed 1/7) treated w/ ertapenem, returned 1/25-1/27 for concern for UTI but no clear infection at that time. Urology collected UA during SPC exchange on 3/17 due to turbid urine and this is now growing VRE.   - Suprapubic catheter exchanged by urology on 3/17  - Continue linezolid for VRE   - zosyn also started overnight   - Follow up final culture/susceptibilities     Hematology:    #Acute on chronic anemia likely secondary to chronic disease  #Thrombocytopenia, HIT ruled out  #Leukopenia (currently resolved)  # Leukocyctosis  Did require pRBC transfusion on 3/13. No signs of bleeding. Platelets have now decreased significantly from ~150 to <50. There was concern for HIT, but screening panel returned negative. Now also with leukopenia. Likely this is related to overall clinical decline and malnutrition, but will investigate other causes. On 3/22, 2 gm Hgb drop in 24 hours, concern for bowel perforation. Will trend CBC labs and transfuse as indicated.  - Holding lovenox/heparin  - Smear sent  - Transfuse as needed for Hgb <7, Plt <10k     Oncology:  # Serous endometrial carcinoma  Follows w/ heme/onc through [facility]. S/p SNEHA, BSO 07/2024 s/p cycle 3 carbo/taxel 10/15/2024, cycle 4 delayed in s/o recent admission, family ultimately decided to forgo any further treatment. During a care conference this admission, it was expressed that her cancer is likely to recur due to it being an aggressive type but when it will recur is unknown. However, should it recur GynOnc team expressed that because of her functional status (significant weakness) and malnutrition, treatment would not be recommended since that would worsen her health overall and benefits would not outweigh the risks.     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 CRP elevated, likely due to inflammation in the setting chronic wounds. Low concern for cellulitis.  - WOC consulted  - Monitor for now, if febrile will start antibiotics  - Urology recommends gauze or drain sponge around the site        Goals of care/ Social:   #Goals of care   #Recurrent hospitalizations with progressive step-wise decline  Per Chart Review, Care conference 3/20 with [facility], medicine, gyn onc. Patient's daughter and one of her cousins attended in person. The teams discussed that we are worried the patient is in the process of dying despite all of the treatment that she has been getting this admission. We recommended that we focus on keeping her comfortable for the time that she has remaining. Her daughter asked if this meant dying in the hospital and stated that she would want her mother at home. This led to a discussion about home hospice which the patient's daughter was agreeable to. She asked if we could continue the current therapies while the patient remains in the hospital which is reasonable to maximize time. We did broach the topic of code status and they were very clear that they want the patient to remain full code at this time.  Discussed with patient's daughter regarding her current decline and necessity of intubation given that she was failing BiPAP and requiring intubation.  Extensively discussed with daughter Joy that intubating the patient was high risk with a high likelihood that she may die during the procedure and that if we were to intubate it would be very unlikely that she would be able to be extubated successfully, which would be discordant with with the [facility] conference that was held on 3/20 regarding transfer to hospice.  Discussed with daughter about discussions had at goals of care conference and asked if this would be within patient's goals..  Daughter  "stated that she \"wanted everything that could be done\" to be done clarifying with patient she stated that she would want her mother to be intubated.  Unfortunately patient experienced PEA arrest shortly after intubation and again after being sent down to the CT scanner.  Called family and discussed with them both times.  Explained to daughter that patient has nonreversible factors that are leading to her heart stopping.  Discussed that by continuing to keep her CODE STATUS as full code that we may in fact be doing more harm to the patient than providing meaningful benefit for her at this time during this course of her care.  Discussed with other family members present in the family waiting room.  Daughter states that at this time she would like to continue all interventions and to continue her mother's full CODE STATUS. On 3/22, this discussion was revisited after there was a new concern for bowel perforation. Daughter understands that patients prognosis is poor and would like patient to stay full code until she is able to visit patient within the next day.  -Ethics consult in place; recommend that there are competing ethic considerations and that we as professionals should not feel compelled to violate professional judgment and perform interventions inappropriately       General Cares/Prophylaxis:    DVT Prophylaxis: SCD's  GI Prophylaxis: PPI  Restraints: none  Family Communication: Daughter  Code Status: FULL code as discussed above     Lines/tubes/drains:  - Port  - Shahid  - Art line  - CVC triple lumen   - ET tube in place      Disposition:  - Medical ICU      Patient seen and findings/plan discussed with medical ICU staff, Dr. Cooper.  Critical care time spent 45 minutes    Philippe Stockton MD  PGY-1    ====================================  INTERVAL HISTORY:   CT overnight shows possible perforated gastric ulcer. 2 gm Hgb drop within the last 24 hours. Given complex ethical situation and avoiding escalation of cares, " night team appropriately deferred medical management. Lactate 2.5->3.4->3.1. Patient otherwise HD stable, but hypothermic.    OBJECTIVE:   1. VITAL SIGNS:   Temp:  [94.3  F (34.6  C)-98.2  F (36.8  C)] 96.8  F (36  C)  Pulse:  [64-91] 75  Resp:  [0-18] 18  BP: (70)/(55) 70/55  MAP:  [59 mmHg-126 mmHg] 70 mmHg  Arterial Line BP: ()/(48-96) 97/51  FiO2 (%):  [40 %-80 %] 40 %  SpO2:  [68 %-100 %] 88 %  FiO2 (%): 40 %, Resp: 18, Vent Mode: CMV/AC, Resp Rate (Set): 16 breaths/min, Tidal Volume (Set, mL): 420 mL, PEEP (cm H2O): 5 cmH2O, Resp Rate (Set): 16 breaths/min, Tidal Volume (Set, mL): 420 mL, PEEP (cm H2O): 5 cmH2O  2. INTAKE/ OUTPUT:   I/O last 3 completed shifts:  In: 3154.94 [I.V.:1474.94; NG/GT:60; IV Piggyback:1190]  Out: 675 [Urine:675]    3. PHYSICAL EXAMINATION:    GEN: intubated, NAD  EYES: pupils small but round and reactive to light  HEENT:  Normocephalic, atraumatic, trachea midline, ETT secure  CV: RRR  PULM/CHEST: Clear breath sounds bilaterally, on vent  GI: abdomen semi firm, distended, bowel sounds hypoactive  : groin/thighs excoriated  EXTREMITIES: leg wraps in place  NEURO: bilateral hand tremors, no tracking, does not follow commands  SKIN: groin excoriation, leg wounds, leg wraps in place. See media tab  PSYCH:  IGOR       4. LABS:   Arterial Blood Gases   Recent Labs   Lab 03/22/25  0102 03/21/25  0427 03/21/25  0145 03/21/25  0045   PH 7.46* 7.44 7.43 7.48*   PCO2 21* 24* 27* 23*   PO2 229* 275* 348* 414*   HCO3 15* 16* 18* 17*     Complete Blood Count   Recent Labs   Lab 03/22/25  0328 03/21/25  0427 03/21/25  0010 03/20/25  2150   WBC 10.2 12.3* 8.9 5.2   HGB 7.3* 8.2* 7.3* 7.7*   PLT 30* 24* 10* 23*     Basic Metabolic Panel  Recent Labs   Lab 03/22/25  0328 03/22/25  0327 03/22/25  0059 03/21/25  2321 03/21/25  1722 03/21/25  1717 03/21/25  1211 03/21/25  1203 03/21/25  0427 03/21/25  0249 03/21/25  0010     --   --   --   --  136  --   --  137  --  147*   POTASSIUM 4.5  --    --   --   --  4.0  --  4.6 3.3*  --  4.0   CHLORIDE 112*  --   --   --   --  111*  --   --  112*  --  117*   CO2 13*  --   --   --   --  14*  --   --  15*  --  24   BUN 18.5  --   --   --   --  16.9  --   --  15.1  --  10.6   CR 1.07*  --   --   --   --  0.91  --   --  0.90  --  0.76   GLC 99 85 83 86   < > 157*   < >  --  177*   < > 71    < > = values in this interval not displayed.     Liver Function Tests  Recent Labs   Lab 25  0328 25  0427 25  0010 25  2150   AST 71* 57* 58* 38 30   ALT 34 28 32 23 25   ALKPHOS 128 101 126 102 113   BILITOTAL 0.9 0.8 0.8 0.4 0.4   ALBUMIN 2.3* 2.2* 1.8* 1.5* 1.8*   INR  --  2.27*  --  2.39* 1.61*     Coagulation Profile  Recent Labs   Lab 25  1717 25  0010 25  2150   INR 2.27* 2.39* 1.61*   PTT 67* 128* 51*       5. RADIOLOGY:   Recent Results (from the past 24 hours)   Echo Complete   Result Value    LVEF  60-65%    Narrative    [id number]  [id number]  [id number]  [id number]^BRENDAN^EFREN^AUSTYN     [facility],[place]  Echocardiography Laboratory  [address] [place] [id number]     Name: QUIQUE WILLIS  MRN: [id number]  : 1955  Study Date: 2025 09:07 AM  Age: 69 yrs  Gender: Female  Patient Location: [facility]  Reason For Study: Shock  Ordering Physician: EFREN CARDONA  Performed By: Jaya Gregory     BSA: 1.8 m2  Height: 63 in  Weight: 159 lb  BP: 107/60 mmHg  ______________________________________________________________________________  Procedure  Echocardiogram with two-dimensional, color and spectral Doppler.  ______________________________________________________________________________  Interpretation Summary  Global and regional left ventricular function is normal with an EF of 60-65%.  Global right ventricular function is normal.  Mild to moderate tricuspid insufficiency is present. The right ventricular  systolic pressure is 31mmHg above the right atrial  pressure.  ______________________________________________________________________________  Left Ventricle  Global and regional left ventricular function is normal with an EF of 60-65%.  Left ventricular size is normal. Thickening of the anterobasal septum is  present. Diastolic Doppler findings (E/E' ratio and/or other parameters)  suggest left ventricular filling pressures are increased. No regional wall  motion abnormalities are seen.     Right Ventricle  The right ventricle is normal size. Global right ventricular function is  normal.     Mitral Valve  Mild mitral annular calcification is present. Trace mitral insufficiency is  present.     Aortic Valve  Mild aortic valve calcification is present.     Tricuspid Valve  Mild to moderate tricuspid insufficiency is present. The right ventricular  systolic pressure is 31mmHg above the right atrial pressure.     Pulmonic Valve  The valve leaflets are not well visualized. On Doppler interrogation, there is  no significant stenosis or regurgitation.     Vessels  Unable to assess mean RA pressure given the patient is on a ventilator.     Pericardium  Trivial pericardial effusion is present.     Compared to Previous Study  This study was compared with the study from 24 tricuspid regurgitation  has progressed. .     ______________________________________________________________________________  MMode/2D Measurements & Calculations  IVSd: 0.85 cm  LVIDd: 3.7 cm  LVIDs: 2.5 cm  LVPWd: 0.77 cm     FS: 33.2 %  LV mass(C)d: 83.3 grams  LV mass(C)dI: 47.5 grams/m2  LVOT diam: 2.0 cm  LVOT area: 3.2 cm2  RWT: 0.42     Doppler Measurements & Calculations  MV E max steve: 73.4 cm/sec  MV A max steve: 111.1 cm/sec  MV E/A: 0.66  MV dec slope: 401.0 cm/sec2  MV dec time: 0.18 sec  TR max steve: 279.3 cm/sec  TR max P.2 mmHg  E/E' av.0  Lateral E/e': 15.3  Medial E/e': 18.7  RV S Steve: 9.7 cm/sec      ______________________________________________________________________________  Report approved by: Tania Marks Dr on 03/21/2025 09:56 AM         CT Head w/o Contrast    Narrative    CT angiogram of the head with contrast  Head CT without contrast    History:  AMS, tremors, follow up from code stroke last night.  Comparison:  CT 3/21/2025, 7/29/2024      Technique: Initial noncontrast images from the skull base to the  vertex were obtained, and reviewed in bone, brain, and subdural  windows.    HEAD and NECK CTA: During rapid bolus intravenous injection of  nonionic contrast material, axial images were obtained using thin  collimation multidetector helical technique from the base of the upper  aortic arch through the [place]. This CT angiogram data was  reconstructed at thin intervals with mild overlap. Images were sent to  the [employer] workstation, and 3D reconstructions were obtained. The  axial source images, multiplanar reformations, 3D reconstructions in  both maximum intensity projection display and volume rendered models  were reviewed, with reconstructions performed by the technologist and  the radiologist    Findings:    Noncontrast head CT:  No acute intracranial hemorrhage, mass effect, or midline shift. No  acute loss of gray-white matter differentiation in the cerebral  hemispheres. Ventricles are proportionate to the cerebral sulci. Clear  basal cisterns. Mild to moderate generalized parenchymal atrophy.  Patchy hypoattenuation of the periventricular and subcortical white  matter which is nonspecific but likely representative of chronic small  vessel ischemic disease given patient's age    The bony calvaria and the bones of the skull base are normal. Trace  mucosal thickening of the paranasal sinuses. The mastoid air cells are  clear. Grossly normal orbits.     Head CTA demonstrates no definite aneurysm or stenosis of the major  intracranial arteries. The anterior communicating  artery is patent.  Hypoplastic A1 segment of the right anterior cerebral artery. Fetal  origin of the right posterior cerebral artery.    Neck CTA demonstrates no stenosis of the major cervical arteries. The  origins of the great vessels from the aortic arch are patent. The  normal distal right internal carotid artery measures 5 mm. The normal  distal left internal carotid artery measures 5 mm.     No mass within the visualized portions of the cervical soft tissues.  Diffuse body wall anasarca. Large bilateral pleural effusions with  mild overlying compressive atelectasis.       Impression    Impression:    1. No acute intracranial pathology on the noncontrast head CT.  2. Head CTA demonstrates no definite aneurysm or stenosis of the major  intracranial arteries.   3. Neck CTA demonstrates no stenosis of the major cervical arteries.  4. Large bilateral pleural effusions.  5. Diffuse body wall anasarca.      I have personally reviewed the examination and initial interpretation  and I agree with the findings.    RONALD MONTEZ MD         SYSTEM ID:  [id number]   CTA Head Neck with Contrast    Narrative    CT angiogram of the head with contrast  Head CT without contrast    History:  AMS, tremors, follow up from code stroke last night.  Comparison:  CT 3/21/2025, 7/29/2024      Technique: Initial noncontrast images from the skull base to the  vertex were obtained, and reviewed in bone, brain, and subdural  windows.    HEAD and NECK CTA: During rapid bolus intravenous injection of  nonionic contrast material, axial images were obtained using thin  collimation multidetector helical technique from the base of the upper  aortic arch through the [place]. This CT angiogram data was  reconstructed at thin intervals with mild overlap. Images were sent to  the [employer] workstation, and 3D reconstructions were obtained. The  axial source images, multiplanar reformations, 3D reconstructions in  both maximum intensity  projection display and volume rendered models  were reviewed, with reconstructions performed by the technologist and  the radiologist    Findings:    Noncontrast head CT:  No acute intracranial hemorrhage, mass effect, or midline shift. No  acute loss of gray-white matter differentiation in the cerebral  hemispheres. Ventricles are proportionate to the cerebral sulci. Clear  basal cisterns. Mild to moderate generalized parenchymal atrophy.  Patchy hypoattenuation of the periventricular and subcortical white  matter which is nonspecific but likely representative of chronic small  vessel ischemic disease given patient's age    The bony calvaria and the bones of the skull base are normal. Trace  mucosal thickening of the paranasal sinuses. The mastoid air cells are  clear. Grossly normal orbits.     Head CTA demonstrates no definite aneurysm or stenosis of the major  intracranial arteries. The anterior communicating artery is patent.  Hypoplastic A1 segment of the right anterior cerebral artery. Fetal  origin of the right posterior cerebral artery.    Neck CTA demonstrates no stenosis of the major cervical arteries. The  origins of the great vessels from the aortic arch are patent. The  normal distal right internal carotid artery measures 5 mm. The normal  distal left internal carotid artery measures 5 mm.     No mass within the visualized portions of the cervical soft tissues.  Diffuse body wall anasarca. Large bilateral pleural effusions with  mild overlying compressive atelectasis.       Impression    Impression:    1. No acute intracranial pathology on the noncontrast head CT.  2. Head CTA demonstrates no definite aneurysm or stenosis of the major  intracranial arteries.   3. Neck CTA demonstrates no stenosis of the major cervical arteries.  4. Large bilateral pleural effusions.  5. Diffuse body wall anasarca.      I have personally reviewed the examination and initial interpretation  and I agree with the  findings.    RONALD MONTEZ MD         SYSTEM ID:  [id number]   CT Chest Pulmonary Embolism w Contrast    Narrative    CT chest pulmonary angiogram with contrast    INDICATION: Concern for pulmonary embolus. Concern for sepsis. Looking  for source of infection.    CONTRAST: 221 mL intravenous Isovue-370. Patient also underwent head  CT today and abdomen pelvis CT today.    COMPARISON: Most recent chest CT 11/27/2024    FINDINGS: Thyroid appears enlarged. Prominent pleural effusions  bilaterally the paranasal asymmetric. Associated atelectasis. Heart  size normal. No obvious pericardial effusion. Thoracic aorta normal  caliber. Pulmonary artery system well opacified with contrast. The  main pulmonary artery caliber was normal approximately 2.5 cm.  There is mild atherosclerotic calcification in the thoracic aorta. No  hypodense filling defect or wall-appearing hypodensity to indicate  obvious acute or chronic pulmonary and most. No weblike deformities of  the pulmonary artery system. As another possible secondary sign of  pulmonary embolus. Body wall edema suggestive of anasarca. Catheter  tip SVC. No right heart enlargement.  Endotracheal tube tip approximately 2.1 cm above the sathya  The aerated portion of the lungs shows no nodule. No pneumothorax. No  pneumomediastinum. No pneumoperitoneum.  The included upper abdomen again is incompletely imaged comment please  review separate abdomen and pelvis CT for further detail. Left adrenal  does appear somewhat hyperplastic on this incomplete imaging set.  Nodules lateral to the right kidney and medial to the inferior right  hepatic lobe, there is a incompletely imaged soft tissue density which  on the abdomen CT appears to represent loop of bowel.  Bone detail shows osteopenia. Mild degenerative changes in the  thoracic spine. Degenerative changes of the left shoulder glenohumeral  joint with joint space narrowing.      Impression    IMPRESSION:  1. No CT evidence of  pulmonary embolus.  2. Large bilateral pleural effusions with associated atelectasis.  3. Intubated.  4. Aortic atherosclerosis.    SHAYNA ADKINS MD         SYSTEM ID:  [id number]   CT Abdomen Pelvis w Contrast    Narrative    EXAM: CT ABDOMEN PELVIS W CONTRAST 3/21/2025 4:08 PM    HISTORY: 69 years Female c/f sepsis, looking for sources of infection.    COMPARISON: CT abdomen/pelvis 3/17/2025, 11/7/2024    TECHNIQUE: Axial CT images from the lung bases through the symphysis  pubis were obtained with IV contrast. Coronal and sagittal reformats  provided. Contrast dose: 154  ml Isovue 370.    FINDINGS:    LINES/TUBES: Suprapubic catheter terminates in the bladder.    LUNG BASES: Small/moderate bilateral pleural effusions with associated  compressive atelectasis.    HEPATIC: No suspicious focal hepatic masses or lesions. Subcentimeter  hypodensity in the left lobe of liver is too small to characterize,  likely a simple cyst.    BILIARY: Distended gallbladder. No calcified gallstones. No intra or  extrahepatic biliary dilatation. Small volume pericholecystic fluid.    SPLEEN: Normal size. No focal lesions.    PANCREAS: No mass or peripancreatic fluid.    ADRENALS: Unremarkable.    RENAL: No hydronephrosis, calculi, or mass. Bilateral renal cortical  scarring, with several areas of wedge-shaped hypoattenuation in the  left kidney, likely secondary to prior vascular insults.    URINARY BLADDER: Suprapubic catheter in place. Contrast media within  the lumen..    PELVIC ORGANS: No pelvic masses.    GASTROINTESTINAL: Postoperative changes of gastric bypass. Question  possible perforated marginal ulcer of the anterior aspect of the  gastric pouch (series 4 image 72, series 7 image 73). Surrounding  fluid and stranding, though potentially confounded by extensive  mesenteric edema. Normal caliber large and small bowel. Rectal wall  thickening with somewhat lobulated contour.    MESENTERY/PERITONEUM: Small volume  intra-abdominal ascites, with  diffuse mesenteric edema.    LYMPH NODES: No lymphadenopathy.    VASCULAR: Major abdominal vessels appear patent. Mild atherosclerotic  vascular calcifications.    MUSCULOSKELETAL: Degenerative changes throughout the spine, hips,  sacroiliac joints. No suspicious bony lesions. No acute osseous  abnormality. Diffuse muscular atrophy. Extensive anasarca. Moderate to  large fat-containing right inguinal hernia.      Impression    IMPRESSION:   1.  Questionable discontinuity of the anterior aspect of the gastric  pouch, concerning for perforated marginal ulcer. Consider further  evaluation with endoscopy.  2.  Diffuse rectal wall thickening, which may represent nonspecific  proctitis or mucosal edema secondary to fluid resuscitation.  3.  Sequela of volume overload including bilateral pleural effusions,  ascites, extensive mesenteric edema, and diffuse anasarca.  4.  Additional chronic and incidental findings as described in the  body of the report.    I have personally reviewed the examination and initial interpretation  and I agree with the findings.    SILVIA ALLEN DO         SYSTEM ID:  [id number]   XR Feeding Tube Placement (for bedside placement contraindication)    Narrative    EXAM: XR FEEDING TUBE PLACEMENT 3/21/2025 4:32 PM    COMPARISON: CT abdomen/pelvis 3/21/2025    HISTORY: 69 years Female kiko en y anatomy    Fluoroscopy time: 1 minutes.    TECHNIQUE: After injection of Xylocaine gel into the right nostril, a  feeding tube was advanced under fluoroscopic guidance.    FINDINGS: Patient has a history of Kiko-en-Y gastric bypass. The  feeding tube was advanced with the tip in the jejunum. A small amount  of barium was injected to define anatomy and demonstrate placement  within the small bowel. Feeding tube was then flushed with normal  saline. The feeding tube was secured using a nasal bridle device.      Impression    IMPRESSION: Uncomplicated feeding tube placement with tip in  the  jejunum.    I, SILVIA ALLEN DO, attest that I was immediately available to provide  guidance and assistance during the entirety of the procedure.    I have personally reviewed the examination and initial interpretation  and I agree with the findings.    SILVIA ALLEN DO         SYSTEM ID:  [id number]

## 2025-03-23 ENCOUNTER — APPOINTMENT (OUTPATIENT)
Dept: GENERAL RADIOLOGY | Facility: CLINIC | Age: 70
End: 2025-03-23
Payer: COMMERCIAL

## 2025-03-23 LAB
ACANTHOCYTES BLD QL SMEAR: SLIGHT
ALBUMIN SERPL BCG-MCNC: 2.7 G/DL (ref 3.5–5.2)
ALP SERPL-CCNC: 132 U/L (ref 40–150)
ALT SERPL W P-5'-P-CCNC: 32 U/L (ref 0–50)
ANION GAP SERPL CALCULATED.3IONS-SCNC: 13 MMOL/L (ref 7–15)
AST SERPL W P-5'-P-CCNC: 69 U/L (ref 0–45)
BASOPHILS # BLD AUTO: 0 10E3/UL (ref 0–0.2)
BASOPHILS NFR BLD AUTO: 0 %
BILIRUB SERPL-MCNC: 1.4 MG/DL
BUN SERPL-MCNC: 19.1 MG/DL (ref 8–23)
BURR CELLS BLD QL SMEAR: SLIGHT
CALCIUM SERPL-MCNC: 8.2 MG/DL (ref 8.8–10.4)
CHLORIDE SERPL-SCNC: 111 MMOL/L (ref 98–107)
CREAT SERPL-MCNC: 1.14 MG/DL (ref 0.51–0.95)
EGFRCR SERPLBLD CKD-EPI 2021: 51 ML/MIN/1.73M2
EOSINOPHIL # BLD AUTO: 0 10E3/UL (ref 0–0.7)
EOSINOPHIL NFR BLD AUTO: 0 %
ERYTHROCYTE [DISTWIDTH] IN BLOOD BY AUTOMATED COUNT: 21.2 % (ref 10–15)
ERYTHROCYTE [DISTWIDTH] IN BLOOD BY AUTOMATED COUNT: 21.4 % (ref 10–15)
ERYTHROCYTE [DISTWIDTH] IN BLOOD BY AUTOMATED COUNT: 21.6 % (ref 10–15)
GLUCOSE BLDC GLUCOMTR-MCNC: 76 MG/DL (ref 70–99)
GLUCOSE BLDC GLUCOMTR-MCNC: 77 MG/DL (ref 70–99)
GLUCOSE BLDC GLUCOMTR-MCNC: 79 MG/DL (ref 70–99)
GLUCOSE BLDC GLUCOMTR-MCNC: 83 MG/DL (ref 70–99)
GLUCOSE BLDC GLUCOMTR-MCNC: 90 MG/DL (ref 70–99)
GLUCOSE BLDC GLUCOMTR-MCNC: 92 MG/DL (ref 70–99)
GLUCOSE SERPL-MCNC: 85 MG/DL (ref 70–99)
HCO3 SERPL-SCNC: 15 MMOL/L (ref 22–29)
HCT VFR BLD AUTO: 22.3 % (ref 35–47)
HCT VFR BLD AUTO: 23.5 % (ref 35–47)
HCT VFR BLD AUTO: 24.4 % (ref 35–47)
HGB BLD-MCNC: 7.5 G/DL (ref 11.7–15.7)
HGB BLD-MCNC: 7.7 G/DL (ref 11.7–15.7)
HGB BLD-MCNC: 8.3 G/DL (ref 11.7–15.7)
IMM GRANULOCYTES # BLD: 0.1 10E3/UL
IMM GRANULOCYTES NFR BLD: 1 %
LACTATE SERPL-SCNC: 1.4 MMOL/L (ref 0.7–2)
LACTATE SERPL-SCNC: 1.7 MMOL/L (ref 0.7–2)
LACTATE SERPL-SCNC: 1.7 MMOL/L (ref 0.7–2)
LACTATE SERPL-SCNC: 2.1 MMOL/L (ref 0.7–2)
LACTATE SERPL-SCNC: 2.4 MMOL/L (ref 0.7–2)
LYMPHOCYTES # BLD AUTO: 1.4 10E3/UL (ref 0.8–5.3)
LYMPHOCYTES # BLD AUTO: 1.5 10E3/UL (ref 0.8–5.3)
LYMPHOCYTES # BLD AUTO: 1.5 10E3/UL (ref 0.8–5.3)
LYMPHOCYTES NFR BLD AUTO: 16 %
LYMPHOCYTES NFR BLD AUTO: 18 %
LYMPHOCYTES NFR BLD AUTO: 20 %
MCH RBC QN AUTO: 30 PG (ref 26.5–33)
MCH RBC QN AUTO: 30.4 PG (ref 26.5–33)
MCH RBC QN AUTO: 30.9 PG (ref 26.5–33)
MCHC RBC AUTO-ENTMCNC: 32.8 G/DL (ref 31.5–36.5)
MCHC RBC AUTO-ENTMCNC: 33.6 G/DL (ref 31.5–36.5)
MCHC RBC AUTO-ENTMCNC: 34 G/DL (ref 31.5–36.5)
MCV RBC AUTO: 89 FL (ref 78–100)
MCV RBC AUTO: 91 FL (ref 78–100)
MCV RBC AUTO: 93 FL (ref 78–100)
MONOCYTES # BLD AUTO: 0.3 10E3/UL (ref 0–1.3)
MONOCYTES # BLD AUTO: 0.3 10E3/UL (ref 0–1.3)
MONOCYTES # BLD AUTO: 0.4 10E3/UL (ref 0–1.3)
MONOCYTES NFR BLD AUTO: 3 %
MONOCYTES NFR BLD AUTO: 4 %
MONOCYTES NFR BLD AUTO: 4 %
NEUTROPHILS # BLD AUTO: 5.6 10E3/UL (ref 1.6–8.3)
NEUTROPHILS # BLD AUTO: 6.6 10E3/UL (ref 1.6–8.3)
NEUTROPHILS # BLD AUTO: 6.8 10E3/UL (ref 1.6–8.3)
NEUTROPHILS NFR BLD AUTO: 76 %
NEUTROPHILS NFR BLD AUTO: 78 %
NEUTROPHILS NFR BLD AUTO: 79 %
NRBC # BLD AUTO: 0 10E3/UL
NRBC BLD AUTO-RTO: 0 /100
NRBC BLD AUTO-RTO: 0 /100
NRBC BLD AUTO-RTO: 1 /100
PLAT MORPH BLD: ABNORMAL
PLATELET # BLD AUTO: 28 10E3/UL (ref 150–450)
PLATELET # BLD AUTO: 29 10E3/UL (ref 150–450)
PLATELET # BLD AUTO: 31 10E3/UL (ref 150–450)
POTASSIUM SERPL-SCNC: 4.1 MMOL/L (ref 3.4–5.3)
PROT SERPL-MCNC: 4 G/DL (ref 6.4–8.3)
RBC # BLD AUTO: 2.5 10E6/UL (ref 3.8–5.2)
RBC # BLD AUTO: 2.53 10E6/UL (ref 3.8–5.2)
RBC # BLD AUTO: 2.69 10E6/UL (ref 3.8–5.2)
RBC MORPH BLD: ABNORMAL
SODIUM SERPL-SCNC: 139 MMOL/L (ref 135–145)
TARGETS BLD QL SMEAR: SLIGHT
WBC # BLD AUTO: 7.4 10E3/UL (ref 4–11)
WBC # BLD AUTO: 8.5 10E3/UL (ref 4–11)
WBC # BLD AUTO: 8.6 10E3/UL (ref 4–11)

## 2025-03-23 PROCEDURE — 999N000157 HC STATISTIC RCP TIME EA 10 MIN

## 2025-03-23 PROCEDURE — 250N000011 HC RX IP 250 OP 636: Performed by: INTERNAL MEDICINE

## 2025-03-23 PROCEDURE — 80053 COMPREHEN METABOLIC PANEL: CPT

## 2025-03-23 PROCEDURE — 200N000002 HC R&B ICU UMMC

## 2025-03-23 PROCEDURE — 250N000013 HC RX MED GY IP 250 OP 250 PS 637

## 2025-03-23 PROCEDURE — 94003 VENT MGMT INPAT SUBQ DAY: CPT

## 2025-03-23 PROCEDURE — 71045 X-RAY EXAM CHEST 1 VIEW: CPT | Mod: 26 | Performed by: RADIOLOGY

## 2025-03-23 PROCEDURE — 71045 X-RAY EXAM CHEST 1 VIEW: CPT

## 2025-03-23 PROCEDURE — 250N000011 HC RX IP 250 OP 636: Performed by: STUDENT IN AN ORGANIZED HEALTH CARE EDUCATION/TRAINING PROGRAM

## 2025-03-23 PROCEDURE — 258N000003 HC RX IP 258 OP 636: Performed by: INTERNAL MEDICINE

## 2025-03-23 PROCEDURE — 250N000009 HC RX 250

## 2025-03-23 PROCEDURE — 99291 CRITICAL CARE FIRST HOUR: CPT | Mod: FS | Performed by: NURSE PRACTITIONER

## 2025-03-23 PROCEDURE — 83605 ASSAY OF LACTIC ACID: CPT

## 2025-03-23 PROCEDURE — 85025 COMPLETE CBC W/AUTO DIFF WBC: CPT

## 2025-03-23 PROCEDURE — 250N000011 HC RX IP 250 OP 636

## 2025-03-23 RX ADMIN — LINEZOLID 600 MG: 600 INJECTION, SOLUTION INTRAVENOUS at 03:11

## 2025-03-23 RX ADMIN — Medication 1 SPRAY: at 20:17

## 2025-03-23 RX ADMIN — PANTOPRAZOLE SODIUM 40 MG: 40 INJECTION, POWDER, FOR SOLUTION INTRAVENOUS at 08:31

## 2025-03-23 RX ADMIN — LEVETIRACETAM 750 MG: 500 INJECTION, SOLUTION INTRAVENOUS at 10:09

## 2025-03-23 RX ADMIN — Medication 1 SPRAY: at 16:47

## 2025-03-23 RX ADMIN — Medication 1 SPRAY: at 11:10

## 2025-03-23 RX ADMIN — LINEZOLID 600 MG: 600 INJECTION, SOLUTION INTRAVENOUS at 16:46

## 2025-03-23 RX ADMIN — PIPERACILLIN AND TAZOBACTAM 4.5 G: 4; .5 INJECTION, POWDER, LYOPHILIZED, FOR SOLUTION INTRAVENOUS at 20:16

## 2025-03-23 RX ADMIN — PIPERACILLIN AND TAZOBACTAM 4.5 G: 4; .5 INJECTION, POWDER, LYOPHILIZED, FOR SOLUTION INTRAVENOUS at 02:13

## 2025-03-23 RX ADMIN — CHLORHEXIDINE GLUCONATE 15 ML: 1.2 SOLUTION ORAL at 08:28

## 2025-03-23 RX ADMIN — PIPERACILLIN AND TAZOBACTAM 4.5 G: 4; .5 INJECTION, POWDER, LYOPHILIZED, FOR SOLUTION INTRAVENOUS at 08:29

## 2025-03-23 RX ADMIN — Medication 1 SPRAY: at 08:28

## 2025-03-23 RX ADMIN — Medication 1 MCG/KG/MIN: at 02:40

## 2025-03-23 RX ADMIN — PIPERACILLIN AND TAZOBACTAM 4.5 G: 4; .5 INJECTION, POWDER, LYOPHILIZED, FOR SOLUTION INTRAVENOUS at 15:48

## 2025-03-23 RX ADMIN — CHLORHEXIDINE GLUCONATE 15 ML: 1.2 SOLUTION ORAL at 20:17

## 2025-03-23 RX ADMIN — LEVETIRACETAM 750 MG: 500 INJECTION, SOLUTION INTRAVENOUS at 22:21

## 2025-03-23 RX ADMIN — THIAMINE HYDROCHLORIDE 250 MG: 100 INJECTION, SOLUTION INTRAMUSCULAR; INTRAVENOUS at 08:27

## 2025-03-23 RX ADMIN — Medication 0.5 MCG/KG/MIN: at 18:26

## 2025-03-23 ASSESSMENT — ACTIVITIES OF DAILY LIVING (ADL)
ADLS_ACUITY_SCORE: 96

## 2025-03-23 NOTE — PLAN OF CARE
ICU End of Shift Summary. See flowsheets for vital signs and detailed assessment.    Changes this shift: Pt remains on Jl and intubated. Family arrived at bedside at 1817 to discuss withdrawing care.     Plan: Per family, withdraw care.        Goal Outcome Evaluation:      Plan of Care Reviewed With: child, caregiver, family    Overall Patient Progress: decliningOverall Patient Progress: declining

## 2025-03-23 NOTE — PROGRESS NOTES
Medical ICU PROGRESS NOTE  03/23/2025      Date of Service (when I saw the patient): 03/23/2025    ASSESSMENT: Alis Hartman is a 69 year old female with PMH of cervical cancer s/p radiation, serous endometrial adenocarcinoma s/p SNEHA/BSO (hysterectomy/ovarian removal) and cystotomy w/ suprapubic catheter placement (7/2024) as well as several cycles of carbo/taxel (10/2024), hx ESBL urosepsis and bacteremia, RNY gastric bypass, afib not on apixaban (stopped due to vaginal bleeding), HTN, CKD3 who was initially admitted with increased confusion, poor appetite and generalized weakness.  Her Hospital course complicated by MADHU, hypervolemia, acute on chronic pain, suspected dementia, and decreased oral intake with severe malnutrition on 3/20 there was plan for discharge with home hospice with continuation of medical cares to maximize patient's remaining time.  Patient began requiring higher level of oxygen support that was unable to be met by BiPAP on the evening of 3/20 and a rapid was called and after extensive discussion with family patient was intubated for airway protection. Patient had PEA arrest x2 following intubation. Now with concern for gastric ulcer perforation.    CHANGES and MAJOR THINGS TODAY:   - transition to comfort care based on family's arrival    PLAN:    Neuro:  # Pain and sedation  - RASS goal 0 to -1  - buprenophine patch on hold     # C/f CVA  # C/f seizures  # New left eye deviation  # small age indeterminate (possibly recent) lacunar infarcts in bilateral thalami  # bilateral tremors of upper extremities  Patient with code stroke called due to acute mental status change and new left eye gaze deviation along with bilateral upper extremity tremors R>L.  Patient was unable to initially receive head CT due to increasing oxygen requirements. She then had a PEA arrest. Did start some head imaging, however then coded a second time during the scan.  -CT head w/o: small age indeterminate  (possibly recent) lacunar infarcts in bilateral thalami, no hemorrhage or mass effect  -CT head perfusion w/ contrast: not able to be completed  -CTA head and neck when stable: poor study, patient was coding during scan  -Received 1x Ativan 4 mg  -Received Keppra loading dose  -Neurology consulted with initial recs being:               - S/p keppra 3000mg IV load  - no further workup at this time pending [facility]  -Neurocrit is consulted to assess for seizures, EEG, and to recommend if we should get any further head imaging.   - Moderately diffuse nonspecific encephalopathy on EEG   - signed off at this time     # Hypothermia  Possible due to shock, post cardiac arrest. Could be related to other etiology such as sepsis.   - has bare hugger on  - cortisol and TSH normal        # Mixed etiology cognitive impairment: contributions from delirium, metabolic encephalopathy, dementia, schizophrenia   Patient initially presenting with confusion.  Has history of paranoia with suspected hallucinations.  Has also had a general decline with refusing to eat and poor nutrition for the past several days.  Patient's encephalopathy likely multifactorial given her poor oral intake, psychiatric history, and dementia component.  Feeding tube attempted on 3/14, but unable to be placed due to disorientation and difficulty with laying flat.  -IV thiamine   -Continue D10 fluids for now, nutrition plan as below  - zyprexa on hold      # Acute on Chronic Pain  Thigh, low back pain related to her coccyx.  Patient was started on buprenorphine patch.  Palliative following.  -Hold buprenorphine patch  -will monitor for signs of pain, currently would be ideal to not sedate patient to allow for an accurate neurological exam     Pulmonary:  # Acute hypoxemia respiratory failure  Patient with increasing acute hypoxemia on BiPAP requiring intubation. Etiology remains unclear. Could consider PE as CXR was relatively unremarkable. Bedside POCUS not strongly  convincing for right heart strain. CT chest shows no PE, but large bilateral pleural effusions present.  - vent bundle    FiO2 (%): 40 %, Resp: 12, Vent Mode: CMV/AC, Resp Rate (Set): 12 breaths/min, Tidal Volume (Set, mL): 420 mL, PEEP (cm H2O): 5 cmH2O, Resp Rate (Set): 12 breaths/min, Tidal Volume (Set, mL): 420 mL, PEEP (cm H2O): 5 cmH2O    Cardiovascular:  # Shock, likely multifactorial, functional outflow tract obstruction noted on bedside POCUS in setting of hypovolemia, possible septic component as well  Patient requiring pressor support.  Had received fluid boluses during rapid response.  Bedside POCUS on 3/21 showed functional outflow tract obstruction in setting of hypovolemia  - Continue abx as discussed below  - formal TTE on 3/21 (done after an additional 1L of LR and 500 ml albumin) showed global and regional left ventricular function is normal with an EF of 60-65%, thickening of anterobasal septal is present, left ventricular filling pressures are increased. Global right ventricular function is normal, Mild to moderate tricuspid insufficiency is present. The right ventricular systolic pressure is 31mmHg above the right atrial pressure.  - Discontinued epinephrine, switched to phenylephrine, levophed off   - MAP goal >65     # PEA arrest x2  Patient with PEA arrest that occurred after intubation and once while in the CT scanner.  Patient achieved ROSC after several rounds of CPR. Suspect arrest was related to hypoxia and functional outflow tract obstruction.   -Plan for comfort care upon family's arrival.      # Elevated troponin   Elevated troponins, likely in the setting of demand ischemia. ECG   - no wall motion abnormalities noted on TTE report     # Hx of Afib   Rate controlled. Prior admission had risk/benefit discussion and discontinued AC due to vaginal bleeding.         GI/Nutrition:  #C/f gastric ulcer perforation  3/21 CT shows discontinuity of anterior aspect of the gastric pouch,  concerning for perforated marginal ulcer. Patient has had a 2 gm Hgb drop within the past day. Discussed case with GI, who does not recommend EGD due to high risk and no efficacy/clinical utility. Per General surgery, patient is not a surgical candidate given their acute illness. Discussed with family and it was agreed to transfuse blood, but not escalate cares given overall clinical picture.  - Hgb checks Q8h, transfuse Hgb<7  - Current antibiotic coverage is sufficient     # Severe Protein-Calorie Malnutrition   # Decreased oral intake/Refusal to eat   # Hypoglycemia  # Hx Kiko en y  # Hypoalbuminemia  Patient has been refusing to eat, has not had good nutrition for several days. Calorie counts 3/11 to 3/15 showed 0 intake.   - Continue D10,  with TF off, can discontinue if/when decision to transition to comfort   - Nutrition consulted, appreciate assistance  - IV PPI      Renal/Fluids/Electrolytes:  #MADHU, oliguric, likely secondary to poor oral intake/malnutrition, renal labs currently stable  #Anasarca  #Dependent facial edema  # Hypokalemia  Creatinine at baseline 0.8-0.9. Renal US normal.  - Nephrology consulted this admission, signed off 3/16  - Lymphedema consulted, appreciate assistance  - Strict I's and O's, daily weights  - Monitor BMP     - RN managed electrolyte replacements     Endocrine:  # Hypoglycemia   Previously patient has been hypoglycemic likely due to poor oral intake  -D10 fluids as above      ID:  # Pyuria, VRE in urine culture   # Hx of ESBL urosepsis and bacteremia  # Bladder dysfunction s/p cystostomy and suprapubic catheter  Recently admitted 11/26 - 12/8/2024 for ESBL urosepsis and bacteremia requiring ICU w/ left nephrostomy tube (removed 1/7) treated w/ ertapenem, returned 1/25-1/27 for concern for UTI but no clear infection at that time. Urology collected UA during SPC exchange on 3/17 due to turbid urine and this is now growing VRE.   - Suprapubic catheter exchanged by urology on  3/17  - Continue linezolid for VRE   - Continue zosyn for asp pna      Hematology:    #Acute on chronic anemia likely secondary to chronic disease  #Thrombocytopenia, HIT ruled out  #Leukopenia (currently resolved)  # Leukocyctosis  Did require pRBC transfusion on 3/13. No signs of bleeding. Platelets have now decreased significantly from ~150 to <50. There was concern for HIT, but screening panel returned negative. Now also with leukopenia. Likely this is related to overall clinical decline and malnutrition, but will investigate other causes. On 3/22, 2 gm Hgb drop in 24 hours, concern for bowel perforation. Not a surgical candidate  - Holding lovenox/heparin  - Smear sent  - Transfuse as needed for Hgb <7, Plt <10k     Oncology:  # Serous endometrial carcinoma  Follows w/ heme/onc through [facility]. S/p SNEHA, BSO 07/2024 s/p cycle 3 carbo/taxel 10/15/2024, cycle 4 delayed in s/o recent admission, family ultimately decided to forgo any further treatment. During a care conference this admission, it was expressed that her cancer is likely to recur due to it being an aggressive type but when it will recur is unknown. However, should it recur GynOnc team expressed that because of her functional status (significant weakness) and malnutrition, treatment would not be recommended since that would worsen her health overall and benefits would not outweigh the risks.     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 CRP elevated, likely due to inflammation in the setting chronic wounds. Low concern for cellulitis.  - WOC consulted  - Urology recommends gauze or drain sponge around the site        Goals of care/ Social:   #Goals of care   #Recurrent hospitalizations with  "progressive step-wise decline  Per Chart Review, Care conference 3/20 with [facility], medicine, gyn onc. Patient's daughter and one of her cousins attended in person. The teams discussed that we are worried the patient is in the process of dying despite all of the treatment that she has been getting this admission. We recommended that we focus on keeping her comfortable for the time that she has remaining. Her daughter asked if this meant dying in the hospital and stated that she would want her mother at home. This led to a discussion about home hospice which the patient's daughter was agreeable to. She asked if we could continue the current therapies while the patient remains in the hospital which is reasonable to maximize time. We did broach the topic of code status and they were very clear that they want the patient to remain full code at this time.  Discussed with patient's daughter regarding her current decline and necessity of intubation given that she was failing BiPAP and requiring intubation.  Extensively discussed with daughter Joy that intubating the patient was high risk with a high likelihood that she may die during the procedure and that if we were to intubate it would be very unlikely that she would be able to be extubated successfully, which would be discordant with with the [facility] conference that was held on 3/20 regarding transfer to hospice.  Discussed with daughter about discussions had at goals of care conference and asked if this would be within patient's goals..  Daughter stated that she \"wanted everything that could be done\" to be done clarifying with patient she stated that she would want her mother to be intubated.  Unfortunately patient experienced PEA arrest shortly after intubation and again after being sent down to the CT scanner.  Called family and discussed with them both times.  Explained to daughter that patient has nonreversible factors that are leading to her heart stopping.  " Discussed that by continuing to keep her CODE STATUS as full code that we may in fact be doing more harm to the patient than providing meaningful benefit for her at this time during this course of her care.  Discussed with other family members present in the family waiting room.  Daughter states that at this time she would like to continue all interventions and to continue her mother's full CODE STATUS. On 3/22, this discussion was revisited after there was a new concern for bowel perforation. Daughter understands that patients prognosis is poor and would like patient to stay full code until she is able to visit patient within the next day.  -Ethics consult in place; recommend that there are competing ethic considerations and that we as professionals should not feel compelled to violate professional judgment and perform interventions inappropriately       General Cares/Prophylaxis:    DVT Prophylaxis: SCD's  GI Prophylaxis: PPI  Restraints: none  Family Communication: Daughter  Code Status: FULL code as discussed above     Lines/tubes/drains:  - Port  - Shahid  - Art line  - CVC triple lumen   - ET tube in place      Disposition:  - Medical ICU      Patient seen and findings/plan discussed with medical ICU staff, Dr. Cooper.  Critical care time spent 34 minutes     ERINN Valdez CNP     ====================================  INTERVAL HISTORY:   No change overnight.       OBJECTIVE:   1. VITAL SIGNS:   Temp:  [96.1  F (35.6  C)-99.1  F (37.3  C)] 98.6  F (37  C)  Pulse:  [60-84] 64  Resp:  [0-18] 12  MAP:  [63 mmHg-93 mmHg] 80 mmHg  Arterial Line BP: ()/(44-67) 125/55  FiO2 (%):  [40 %] 40 %  SpO2:  [84 %-100 %] 100 %  FiO2 (%): 40 %, Resp: 12, Vent Mode: CMV/AC, Resp Rate (Set): 12 breaths/min, Tidal Volume (Set, mL): 420 mL, PEEP (cm H2O): 5 cmH2O, Resp Rate (Set): 12 breaths/min, Tidal Volume (Set, mL): 420 mL, PEEP (cm H2O): 5 cmH2O  2. INTAKE/ OUTPUT:   I/O last 3 completed shifts:  In: 9107.27  [I.V.:2745.27; IV Piggyback:120]  Out: 1080 [Urine:1080]    3. PHYSICAL EXAMINATION:    GEN: intubated, NAD  EYES: pupils small but round and reactive to light  HEENT:  Normocephalic, atraumatic, trachea midline, ETT secure  CV: RRR  PULM/CHEST: Clear breath sounds bilaterally, mechanically ventilated   GI: abdomen semi firm, distended, bowel sounds hypoactive  : groin/thighs excoriated  EXTREMITIES: leg wraps in place, 3+ edema   NEURO: bilateral hand tremors, no tracking, does not follow commands  SKIN: groin excoriation, leg wounds, leg wraps in place. See media tab  PSYCH:  IGOR       4. LABS:   Arterial Blood Gases   Recent Labs   Lab 03/22/25 2008 03/22/25 1802 03/22/25 1623 03/22/25  0102   PH 7.49* 7.47* 7.49* 7.46*   PCO2 23* 24* 22* 21*   PO2 180* 178* 182* 229*   HCO3 18* 17* 17* 15*     Complete Blood Count   Recent Labs   Lab 03/23/25 0318 03/22/25 2008 03/22/25 1623 03/22/25  0934 03/22/25  0328   WBC 8.6 9.6 9.8  --  10.2   HGB 8.3* 8.3* 8.7* 6.2* 7.3*   PLT 31* 31* 28*  --  30*     Basic Metabolic Panel  Recent Labs   Lab 03/23/25 0318 03/23/25  0316 03/22/25 2007 03/22/25  1621 03/22/25  1415 03/22/25  0328 03/21/25  1722 03/21/25  1717 03/21/25  1211 03/21/25  1203 03/21/25  0427     --   --   --   --  138  --  136  --   --  137   POTASSIUM 4.1  --   --   --   --  4.5  --  4.0  --  4.6 3.3*   CHLORIDE 111*  --   --   --   --  112*  --  111*  --   --  112*   CO2 15*  --   --   --   --  13*  --  14*  --   --  15*   BUN 19.1  --   --   --   --  18.5  --  16.9  --   --  15.1   CR 1.14*  --   --   --   --  1.07*  --  0.91  --   --  0.90   GLC 85 76 75 77   < > 99   < > 157*   < >  --  177*    < > = values in this interval not displayed.     Liver Function Tests  Recent Labs   Lab 03/23/25  0318 03/22/25  0328 03/21/25  1717 03/21/25  0427 03/21/25  0010 03/20/25  2150   AST 69* 71* 57* 58* 38 30   ALT 32 34 28 32 23 25   ALKPHOS 132 128 101 126 102 113   BILITOTAL 1.4* 0.9 0.8 0.8 0.4 0.4    ALBUMIN 2.7* 2.3* 2.2* 1.8* 1.5* 1.8*   INR  --   --  2.27*  --  2.39* 1.61*     Coagulation Profile  Recent Labs   Lab 03/21/25  1717 03/21/25  0010 03/20/25  2150   INR 2.27* 2.39* 1.61*   PTT 67* 128* 51*       5. RADIOLOGY:   Recent Results (from the past 24 hours)   XR Chest Port 1 View    Narrative    Examination:  XR CHEST PORT 1 VIEW    Date:  3/22/2025 10:48 AM     Clinical Information: intubated     Comparison: CT dated 3/21/2025    Findings:   Portable AP view of the chest. Stable positioning of endotracheal tube  tip in the midtrachea. Esophageal temperature probe is in the distal  esophagus. Partially visualized enteric tube. Stable positioning of  right central line tip in the SVC. Stable cardiomediastinal  silhouette. No significant change in bilateral pleural effusions,  right greater than left. Unchanged mixed bibasilar pulmonary  opacities. No discernible pneumothorax.      Impression    Impression:  1. Endotracheal tube tip terminates in the midthoracic trachea.  2. No significant change in bilateral pleural effusions, right greater  than left.  3. Unchanged mixed bibasilar pulmonary opacities, likely  edema/atelectasis.    RENNY SWANN MD         SYSTEM ID:  [id number]

## 2025-03-23 NOTE — PLAN OF CARE
ICU End of Shift Summary. See flowsheets for vital signs and detailed assessment.    Changes this shift: Patient able to withdrawn to pain and during cares will push away but allows for turns. SR with rates between 50s to 60s; phenyl currently at 0.5 mcg/kg/min to meet MAP goal of 65. CVC and ART dressing changed x 2 due to saturation from wounds. D10 currently at 45 mL/hr which was goal rate for tube feeds. Comfort cares will beginning once family is ready to transition.    Plan: Titrate vasopressors as tolerated. Continue to support family presence with patient.     Goal Outcome Evaluation:      Plan of Care Reviewed With: patient, child    Overall Patient Progress: decliningOverall Patient Progress: declining    Outcome Evaluation: kavin tirated between 0.5 to 1; wound dressing changed per wound rec

## 2025-03-24 ENCOUNTER — APPOINTMENT (OUTPATIENT)
Dept: GENERAL RADIOLOGY | Facility: CLINIC | Age: 70
End: 2025-03-24
Payer: COMMERCIAL

## 2025-03-24 LAB
ALBUMIN SERPL BCG-MCNC: 2.2 G/DL (ref 3.5–5.2)
ALP SERPL-CCNC: 129 U/L (ref 40–150)
ALT SERPL W P-5'-P-CCNC: 31 U/L (ref 0–50)
ANION GAP SERPL CALCULATED.3IONS-SCNC: 13 MMOL/L (ref 7–15)
AST SERPL W P-5'-P-CCNC: 57 U/L (ref 0–45)
BASOPHILS # BLD AUTO: 0 10E3/UL (ref 0–0.2)
BASOPHILS NFR BLD AUTO: 0 %
BILIRUB SERPL-MCNC: 1.3 MG/DL
BUN SERPL-MCNC: 18 MG/DL (ref 8–23)
BURR CELLS BLD QL SMEAR: SLIGHT
CALCIUM SERPL-MCNC: 7.8 MG/DL (ref 8.8–10.4)
CHLORIDE SERPL-SCNC: 112 MMOL/L (ref 98–107)
CREAT SERPL-MCNC: 1.13 MG/DL (ref 0.51–0.95)
EGFRCR SERPLBLD CKD-EPI 2021: 52 ML/MIN/1.73M2
EOSINOPHIL # BLD AUTO: 0 10E3/UL (ref 0–0.7)
EOSINOPHIL # BLD AUTO: 0 10E3/UL (ref 0–0.7)
EOSINOPHIL # BLD AUTO: 0.1 10E3/UL (ref 0–0.7)
EOSINOPHIL NFR BLD AUTO: 0 %
EOSINOPHIL NFR BLD AUTO: 0 %
EOSINOPHIL NFR BLD AUTO: 1 %
ERYTHROCYTE [DISTWIDTH] IN BLOOD BY AUTOMATED COUNT: 21.7 % (ref 10–15)
ERYTHROCYTE [DISTWIDTH] IN BLOOD BY AUTOMATED COUNT: 21.7 % (ref 10–15)
ERYTHROCYTE [DISTWIDTH] IN BLOOD BY AUTOMATED COUNT: 22.1 % (ref 10–15)
FRAGMENTS BLD QL SMEAR: SLIGHT
GLUCOSE BLDC GLUCOMTR-MCNC: 119 MG/DL (ref 70–99)
GLUCOSE BLDC GLUCOMTR-MCNC: 58 MG/DL (ref 70–99)
GLUCOSE BLDC GLUCOMTR-MCNC: 72 MG/DL (ref 70–99)
GLUCOSE BLDC GLUCOMTR-MCNC: 78 MG/DL (ref 70–99)
GLUCOSE BLDC GLUCOMTR-MCNC: 87 MG/DL (ref 70–99)
GLUCOSE BLDC GLUCOMTR-MCNC: 89 MG/DL (ref 70–99)
GLUCOSE SERPL-MCNC: 95 MG/DL (ref 70–99)
HCO3 SERPL-SCNC: 15 MMOL/L (ref 22–29)
HCT VFR BLD AUTO: 21.5 % (ref 35–47)
HCT VFR BLD AUTO: 22.4 % (ref 35–47)
HCT VFR BLD AUTO: 24.2 % (ref 35–47)
HGB BLD-MCNC: 6.9 G/DL (ref 11.7–15.7)
HGB BLD-MCNC: 7.4 G/DL (ref 11.7–15.7)
HGB BLD-MCNC: 8 G/DL (ref 11.7–15.7)
IMM GRANULOCYTES # BLD: 0.1 10E3/UL
IMM GRANULOCYTES NFR BLD: 1 %
LACTATE SERPL-SCNC: 2.4 MMOL/L (ref 0.7–2)
LACTATE SERPL-SCNC: 2.4 MMOL/L (ref 0.7–2)
LACTATE SERPL-SCNC: 2.6 MMOL/L (ref 0.7–2)
LACTATE SERPL-SCNC: 2.9 MMOL/L (ref 0.7–2)
LYMPHOCYTES # BLD AUTO: 1.3 10E3/UL (ref 0.8–5.3)
LYMPHOCYTES # BLD AUTO: 1.5 10E3/UL (ref 0.8–5.3)
LYMPHOCYTES # BLD AUTO: 1.6 10E3/UL (ref 0.8–5.3)
LYMPHOCYTES NFR BLD AUTO: 14 %
LYMPHOCYTES NFR BLD AUTO: 16 %
LYMPHOCYTES NFR BLD AUTO: 20 %
MCH RBC QN AUTO: 30.7 PG (ref 26.5–33)
MCH RBC QN AUTO: 30.7 PG (ref 26.5–33)
MCH RBC QN AUTO: 30.9 PG (ref 26.5–33)
MCHC RBC AUTO-ENTMCNC: 32.1 G/DL (ref 31.5–36.5)
MCHC RBC AUTO-ENTMCNC: 33 G/DL (ref 31.5–36.5)
MCHC RBC AUTO-ENTMCNC: 33.1 G/DL (ref 31.5–36.5)
MCV RBC AUTO: 93 FL (ref 78–100)
MCV RBC AUTO: 93 FL (ref 78–100)
MCV RBC AUTO: 96 FL (ref 78–100)
MONOCYTES # BLD AUTO: 0.3 10E3/UL (ref 0–1.3)
MONOCYTES NFR BLD AUTO: 3 %
MONOCYTES NFR BLD AUTO: 4 %
MONOCYTES NFR BLD AUTO: 4 %
NEUTROPHILS # BLD AUTO: 6 10E3/UL (ref 1.6–8.3)
NEUTROPHILS # BLD AUTO: 7.2 10E3/UL (ref 1.6–8.3)
NEUTROPHILS # BLD AUTO: 7.6 10E3/UL (ref 1.6–8.3)
NEUTROPHILS NFR BLD AUTO: 75 %
NEUTROPHILS NFR BLD AUTO: 79 %
NEUTROPHILS NFR BLD AUTO: 82 %
NRBC # BLD AUTO: 0 10E3/UL
NRBC BLD AUTO-RTO: 0 /100
NRBC BLD AUTO-RTO: 0 /100
NRBC BLD AUTO-RTO: 1 /100
PLAT MORPH BLD: ABNORMAL
PLATELET # BLD AUTO: 24 10E3/UL (ref 150–450)
PLATELET # BLD AUTO: 28 10E3/UL (ref 150–450)
PLATELET # BLD AUTO: 29 10E3/UL (ref 150–450)
POTASSIUM SERPL-SCNC: 3.7 MMOL/L (ref 3.4–5.3)
PROT SERPL-MCNC: 3.6 G/DL (ref 6.4–8.3)
RBC # BLD AUTO: 2.25 10E6/UL (ref 3.8–5.2)
RBC # BLD AUTO: 2.41 10E6/UL (ref 3.8–5.2)
RBC # BLD AUTO: 2.59 10E6/UL (ref 3.8–5.2)
RBC MORPH BLD: ABNORMAL
SODIUM SERPL-SCNC: 140 MMOL/L (ref 135–145)
WBC # BLD AUTO: 8 10E3/UL (ref 4–11)
WBC # BLD AUTO: 9.1 10E3/UL (ref 4–11)
WBC # BLD AUTO: 9.4 10E3/UL (ref 4–11)

## 2025-03-24 PROCEDURE — 83605 ASSAY OF LACTIC ACID: CPT

## 2025-03-24 PROCEDURE — 250N000013 HC RX MED GY IP 250 OP 250 PS 637: Performed by: PHYSICIAN ASSISTANT

## 2025-03-24 PROCEDURE — 250N000011 HC RX IP 250 OP 636

## 2025-03-24 PROCEDURE — 258N000003 HC RX IP 258 OP 636: Performed by: INTERNAL MEDICINE

## 2025-03-24 PROCEDURE — 250N000011 HC RX IP 250 OP 636: Performed by: INTERNAL MEDICINE

## 2025-03-24 PROCEDURE — 71045 X-RAY EXAM CHEST 1 VIEW: CPT | Mod: 26 | Performed by: RADIOLOGY

## 2025-03-24 PROCEDURE — 99291 CRITICAL CARE FIRST HOUR: CPT | Mod: GC | Performed by: INTERNAL MEDICINE

## 2025-03-24 PROCEDURE — 999N000157 HC STATISTIC RCP TIME EA 10 MIN

## 2025-03-24 PROCEDURE — 85025 COMPLETE CBC W/AUTO DIFF WBC: CPT

## 2025-03-24 PROCEDURE — 250N000011 HC RX IP 250 OP 636: Mod: JZ | Performed by: INTERNAL MEDICINE

## 2025-03-24 PROCEDURE — 999N000253 HC STATISTIC WEANING TRIALS

## 2025-03-24 PROCEDURE — 258N000003 HC RX IP 258 OP 636

## 2025-03-24 PROCEDURE — 99233 SBSQ HOSP IP/OBS HIGH 50: CPT | Performed by: PHYSICIAN ASSISTANT

## 2025-03-24 PROCEDURE — 250N000013 HC RX MED GY IP 250 OP 250 PS 637

## 2025-03-24 PROCEDURE — P9047 ALBUMIN (HUMAN), 25%, 50ML: HCPCS

## 2025-03-24 PROCEDURE — 71045 X-RAY EXAM CHEST 1 VIEW: CPT

## 2025-03-24 PROCEDURE — 258N000001 HC RX 258

## 2025-03-24 PROCEDURE — 80053 COMPREHEN METABOLIC PANEL: CPT

## 2025-03-24 PROCEDURE — 94003 VENT MGMT INPAT SUBQ DAY: CPT

## 2025-03-24 PROCEDURE — 250N000011 HC RX IP 250 OP 636: Performed by: STUDENT IN AN ORGANIZED HEALTH CARE EDUCATION/TRAINING PROGRAM

## 2025-03-24 PROCEDURE — 200N000002 HC R&B ICU UMMC

## 2025-03-24 RX ORDER — DEXTROSE MONOHYDRATE 100 MG/ML
INJECTION, SOLUTION INTRAVENOUS CONTINUOUS
Status: DISCONTINUED | OUTPATIENT
Start: 2025-03-24 | End: 2025-03-25

## 2025-03-24 RX ORDER — ALBUMIN (HUMAN) 12.5 G/50ML
25 SOLUTION INTRAVENOUS EVERY 8 HOURS
Status: DISCONTINUED | OUTPATIENT
Start: 2025-03-24 | End: 2025-03-27

## 2025-03-24 RX ADMIN — LINEZOLID 600 MG: 600 INJECTION, SOLUTION INTRAVENOUS at 03:20

## 2025-03-24 RX ADMIN — PIPERACILLIN AND TAZOBACTAM 4.5 G: 4; .5 INJECTION, POWDER, LYOPHILIZED, FOR SOLUTION INTRAVENOUS at 02:42

## 2025-03-24 RX ADMIN — Medication 1 SPRAY: at 08:51

## 2025-03-24 RX ADMIN — ALBUMIN HUMAN 25 G: 0.25 SOLUTION INTRAVENOUS at 18:48

## 2025-03-24 RX ADMIN — CHLORHEXIDINE GLUCONATE 15 ML: 1.2 SOLUTION ORAL at 19:22

## 2025-03-24 RX ADMIN — PANTOPRAZOLE SODIUM 40 MG: 40 INJECTION, POWDER, FOR SOLUTION INTRAVENOUS at 08:39

## 2025-03-24 RX ADMIN — Medication 1 SPRAY: at 16:19

## 2025-03-24 RX ADMIN — PIPERACILLIN AND TAZOBACTAM 4.5 G: 4; .5 INJECTION, POWDER, LYOPHILIZED, FOR SOLUTION INTRAVENOUS at 08:39

## 2025-03-24 RX ADMIN — Medication 25 MCG/HR: at 18:51

## 2025-03-24 RX ADMIN — PIPERACILLIN AND TAZOBACTAM 4.5 G: 4; .5 INJECTION, POWDER, LYOPHILIZED, FOR SOLUTION INTRAVENOUS at 16:17

## 2025-03-24 RX ADMIN — SODIUM CHLORIDE, POTASSIUM CHLORIDE, SODIUM LACTATE AND CALCIUM CHLORIDE 250 ML: 600; 310; 30; 20 INJECTION, SOLUTION INTRAVENOUS at 18:47

## 2025-03-24 RX ADMIN — THIAMINE HYDROCHLORIDE 250 MG: 100 INJECTION, SOLUTION INTRAMUSCULAR; INTRAVENOUS at 08:39

## 2025-03-24 RX ADMIN — Medication 1 SPRAY: at 11:25

## 2025-03-24 RX ADMIN — LEVETIRACETAM 750 MG: 500 INJECTION, SOLUTION INTRAVENOUS at 20:52

## 2025-03-24 RX ADMIN — Medication 1 SPRAY: at 19:22

## 2025-03-24 RX ADMIN — PIPERACILLIN AND TAZOBACTAM 4.5 G: 4; .5 INJECTION, POWDER, LYOPHILIZED, FOR SOLUTION INTRAVENOUS at 22:30

## 2025-03-24 RX ADMIN — BUPRENORPHINE 1 PATCH: 20 PATCH, EXTENDED RELEASE TRANSDERMAL at 16:19

## 2025-03-24 RX ADMIN — LINEZOLID 600 MG: 600 INJECTION, SOLUTION INTRAVENOUS at 16:20

## 2025-03-24 RX ADMIN — DEXTROSE 1000 ML: 10 SOLUTION INTRAVENOUS at 11:24

## 2025-03-24 RX ADMIN — CHLORHEXIDINE GLUCONATE 15 ML: 1.2 SOLUTION ORAL at 08:48

## 2025-03-24 RX ADMIN — LEVETIRACETAM 750 MG: 500 INJECTION, SOLUTION INTRAVENOUS at 10:11

## 2025-03-24 ASSESSMENT — ACTIVITIES OF DAILY LIVING (ADL)
ADLS_ACUITY_SCORE: 96
ADLS_ACUITY_SCORE: 100
ADLS_ACUITY_SCORE: 102
ADLS_ACUITY_SCORE: 96
ADLS_ACUITY_SCORE: 100
ADLS_ACUITY_SCORE: 102
ADLS_ACUITY_SCORE: 96
ADLS_ACUITY_SCORE: 96
ADLS_ACUITY_SCORE: 102
ADLS_ACUITY_SCORE: 100
ADLS_ACUITY_SCORE: 96
ADLS_ACUITY_SCORE: 96
ADLS_ACUITY_SCORE: 100
ADLS_ACUITY_SCORE: 96
ADLS_ACUITY_SCORE: 102
ADLS_ACUITY_SCORE: 96

## 2025-03-24 NOTE — PLAN OF CARE
ICU End of Shift Summary. See flowsheets for vital signs and detailed assessment.    Changes this shift: Patient able to withdraw to pain. Pupils are reactive with NPI's in the 4s. SR to SB with rates between 40s to 60s; phenyl currently at 0.3 mcg/kg/min to meet MAP goal of 65. CVC dressing changed x 1 due to saturation from wounds. Femoral ART line accidentally removed during nursing cares. D10 currently at 50 mL/hr to prevent hypoglycemia. Comfort cares will beginning once family is ready to transition.    Plan: Titrate vasopressors as tolerated. Continue to support family presence with patient.     Goal Outcome Evaluation:      Plan of Care Reviewed With: patient, child    Overall Patient Progress: decliningOverall Patient Progress: declining    Outcome Evaluation: kavin tirated between 0.5 to 1; wound dressing changed per wound rec

## 2025-03-24 NOTE — PROGRESS NOTES
"SPIRITUAL HEALTH SERVICES Follow Up Note (Palliative)  [facility] ([place]) 4C     Summary: Offered prayer with patient Alis \"Aranza\" Washington at bedside, as she had welcomed prayer earlier in hospitalization. No family present at time of visit; I spoke with Palliative Care medical provider and unit RN and reviewed chart.     Plan: Chaplains will follow for spiritual support while Aranza is admitted.     Millie Pena M.Div., [facility]     To reach Spiritual Health, securely message with the Vocera Web Console or enter an ASAP/STAT consult in [employer], which will also page the on-call .    "

## 2025-03-24 NOTE — PROGRESS NOTES
Medical ICU PROGRESS NOTE  03/24/2025      Date of Service (when I saw the patient): 03/24/2025    ASSESSMENT: Alis Hartman is a 69 year old female with PMH of cervical cancer s/p radiation, serous endometrial adenocarcinoma s/p SNEHA/BSO (hysterectomy/ovarian removal) and cystotomy w/ suprapubic catheter placement (7/2024) as well as several cycles of carbo/taxel (10/2024), hx ESBL urosepsis and bacteremia, RNY gastric bypass, afib not on apixaban (stopped due to vaginal bleeding), HTN, CKD3 who was initially admitted with increased confusion, poor appetite and generalized weakness.  Her Hospital course complicated by MADHU, hypervolemia, acute on chronic pain, suspected dementia, and decreased oral intake with severe malnutrition on 3/20 there was plan for discharge with home hospice with continuation of medical cares to maximize patient's remaining time.  Patient began requiring higher level of oxygen support that was unable to be met by BiPAP on the evening of 3/20 and a rapid was called and after extensive discussion with family patient was intubated for airway protection. Patient had PEA arrest x2 following intubation. Now with concern for gastric ulcer perforation.    CHANGES and MAJOR THINGS TODAY:   - Ongoing goals of care discussions with family prior to determining treatment course    PLAN:    Neuro:  # Pain and sedation  - RASS goal 0 to -1  - buprenophine patch on hold     # C/f CVA  # C/f seizures  # New left eye deviation  # small age indeterminate (possibly recent) lacunar infarcts in bilateral thalami  # bilateral tremors of upper extremities  Patient with code stroke called due to acute mental status change and new left eye gaze deviation along with bilateral upper extremity tremors R>L.  Patient was unable to initially receive head CT due to increasing oxygen requirements. She then had a PEA arrest. Did start some head imaging, however then coded a second time during the scan. Now responding  to commands, moving eyes.  -CT head w/o: small age indeterminate (possibly recent) lacunar infarcts in bilateral thalami, no hemorrhage or mass effect  -CT head perfusion w/ contrast: not able to be completed  -CTA head and neck when stable: poor study, patient was coding during scan  -Received 1x Ativan 4 mg  -Received Keppra loading dose  -Neurology consulted with initial recs being:               - S/p keppra 3000mg IV load  - no further workup at this time pending [facility]  -Neurocrit is consulted to assess for seizures, EEG, and to recommend if we should get any further head imaging.   - Moderately diffuse nonspecific encephalopathy on EEG   - signed off at this time     # Hypothermia  Possible due to shock, post cardiac arrest. Could be related to other etiology such as sepsis.   - has bare hugger on  - cortisol and TSH normal     # Mixed etiology cognitive impairment: contributions from delirium, metabolic encephalopathy, dementia, schizophrenia   Patient initially presenting with confusion.  Has history of paranoia with suspected hallucinations.  Has also had a general decline with refusing to eat and poor nutrition for the past several days.  Patient's encephalopathy likely multifactorial given her poor oral intake, psychiatric history, and dementia component.  Feeding tube attempted on 3/14, but unable to be placed due to disorientation and difficulty with laying flat.  -IV thiamine   -Continue D10 fluids for now, nutrition plan as below  - zyprexa on hold      # Acute on Chronic Pain  Thigh, low back pain related to her coccyx.  Patient was started on buprenorphine patch.  Palliative following.  -Hold buprenorphine patch  -will monitor for signs of pain, currently would be ideal to not sedate patient to allow for an accurate neurological exam     Pulmonary:  # Acute hypoxemia respiratory failure  # Bilateral pleural effusions  Patient with increasing acute hypoxemia on BiPAP requiring intubation. Etiology  remains unclear. Could consider PE as CXR was relatively unremarkable. Bedside POCUS not strongly convincing for right heart strain. CT chest shows no PE, but large bilateral pleural effusions present.  - vent bundle  - Attempt SBP now that patient is following commands  - Consider thoracentesis based on goals of care    FiO2 (%): 40 %, Resp: 12, Vent Mode: CMV/AC, Resp Rate (Set): 12 breaths/min, Tidal Volume (Set, mL): 420 mL, PEEP (cm H2O): 5 cmH2O, Resp Rate (Set): 12 breaths/min, Tidal Volume (Set, mL): 420 mL, PEEP (cm H2O): 5 cmH2O    Cardiovascular:  # Shock, likely multifactorial, functional outflow tract obstruction noted on bedside POCUS in setting of hypovolemia, possible septic component as well  Patient requiring pressor support.  Had received fluid boluses during rapid response.  Bedside POCUS on 3/21 showed functional outflow tract obstruction in setting of hypovolemia. Formal TTE on 3/21 (done after an additional 1L of LR and 500 ml albumin) showed global and regional left ventricular function is normal with an EF of 60-65%, thickening of anterobasal septal is present, left ventricular filling pressures are increased. Global right ventricular function is normal, Mild to moderate tricuspid insufficiency is present. The right ventricular systolic pressure is 31mmHg above the right atrial pressure.Has significant third spacing, however, given outflow tract obstruction, volume depletion with diuresis risks worsening hypotension. Will continue to weigh risks/benefits of diuresis.  - Continue abx as discussed below  - Phenylephrine  - MAP goal >65     # PEA arrest x2  Patient with PEA arrest that occurred after intubation and once while in the CT scanner.  Patient achieved ROSC after several rounds of CPR. Suspect arrest was related to hypoxia and functional outflow tract obstruction.   -Plan for comfort care upon family's arrival.      # Elevated troponin   Elevated troponins, likely in the setting of  demand ischemia. ECG   - no wall motion abnormalities noted on TTE report     # Hx of Afib   Rate controlled. Prior admission had risk/benefit discussion and discontinued AC due to vaginal bleeding.         GI/Nutrition:  #C/f gastric ulcer perforation  3/21 CT shows discontinuity of anterior aspect of the gastric pouch, concerning for perforated marginal ulcer. Patient has had a 2 gm Hgb drop within the past day. Discussed case with GI, who does not recommend EGD due to high risk and no efficacy/clinical utility. Per General surgery, patient is not a surgical candidate given their acute illness. Discussed with family and it was agreed to transfuse blood, but not escalate cares given overall clinical picture.  - Hgb checks Q8h, transfuse Hgb<7  - Current antibiotic coverage is sufficient     # Severe Protein-Calorie Malnutrition   # Decreased oral intake/Refusal to eat   # Hypoglycemia  # Hx Kiko en y  # Hypoalbuminemia  Patient has been refusing to eat, has not had good nutrition for several days. Calorie counts 3/11 to 3/15 showed 0 intake.   - Continue D10,  with TF off, can discontinue if/when decision to transition to comfort   - Nutrition consulted, appreciate assistance  - IV PPI      Renal/Fluids/Electrolytes:  #MADHU, oliguric, likely secondary to poor oral intake/malnutrition, renal labs currently stable  #Anasarca  #Dependent facial edema  # Hypokalemia  Creatinine at baseline 0.8-0.9. Renal US normal.  - Nephrology consulted this admission, signed off 3/16  - Lymphedema consulted, appreciate assistance  - Strict I's and O's, daily weights  - Monitor BMP     - RN managed electrolyte replacements     Endocrine:  # Hypoglycemia   Previously patient has been hypoglycemic likely due to poor oral intake  -D10 fluids as above      ID:  # Pyuria, VRE in urine culture   # Hx of ESBL urosepsis and bacteremia  # Bladder dysfunction s/p cystostomy and suprapubic catheter  Recently admitted 11/26 - 12/8/2024 for ESBL  urosepsis and bacteremia requiring ICU w/ left nephrostomy tube (removed 1/7) treated w/ ertapenem, returned 1/25-1/27 for concern for UTI but no clear infection at that time. Urology collected UA during SPC exchange on 3/17 due to turbid urine and this is now growing VRE.   - Suprapubic catheter exchanged by urology on 3/17  - Continue linezolid for VRE   - Continue zosyn for asp pna      Hematology:    #Acute on chronic anemia likely secondary to chronic disease  #Thrombocytopenia, HIT ruled out  #Leukopenia (currently resolved)  # Leukocyctosis  Did require pRBC transfusion on 3/13. No signs of bleeding. Platelets have now decreased significantly from ~150 to <50. There was concern for HIT, but screening panel returned negative. Now also with leukopenia. Likely this is related to overall clinical decline and malnutrition, but will investigate other causes. On 3/22, 2 gm Hgb drop in 24 hours, concern for bowel perforation. Not a surgical candidate.  - Holding lovenox/heparin  - Smear sent  - Transfuse as needed for Hgb <7, Plt <10k     Oncology:  # Serous endometrial carcinoma  Follows w/ heme/onc through [facility]. S/p SNEHA, BSO 07/2024 s/p cycle 3 carbo/taxel 10/15/2024, cycle 4 delayed in s/o recent admission, family ultimately decided to forgo any further treatment. During a care conference this admission, it was expressed that her cancer is likely to recur due to it being an aggressive type but when it will recur is unknown. However, should it recur GynOnc team expressed that because of her functional status (significant weakness) and malnutrition, treatment would not be recommended since that would worsen her health overall and benefits would not outweigh the risks.     Musculoskeletal:  # Deconditioning  Await goals of care, consider PT/OT if appropriate        Skin:  # Sacral pressure ulcer  # Lower extremity sores  # Wound around suprapubic catheter site   # Groin/Thigh excoriation  Wound likely from  "chronic moisture dermatitis and history of radiation at that site. CRP elevated, likely due to inflammation in the setting chronic wounds. Low concern for cellulitis.  - WOC consulted  - Urology recommends gauze or drain sponge around the site        Goals of care/ Social:   #Goals of care   #Recurrent hospitalizations with progressive step-wise decline  Per Chart Review, Care conference 3/20 with [facility], medicine, gyn onc. Patient's daughter and one of her cousins attended in person. The teams discussed that we are worried the patient is in the process of dying despite all of the treatment that she has been getting this admission. We recommended that we focus on keeping her comfortable for the time that she has remaining. Her daughter asked if this meant dying in the hospital and stated that she would want her mother at home. This led to a discussion about home hospice which the patient's daughter was agreeable to. Daughter stated that she \"wanted everything that could be done\" to be done clarifying with patient she stated that she would want her mother to be intubated.  Unfortunately patient experienced PEA arrest shortly after intubation and again after being sent down to the CT scanner.  Called family and discussed with them both times.  Discussed that by continuing to keep her CODE STATUS as full code that we may in fact be doing more harm to the patient than providing meaningful benefit for her at this time during this course of her care. Daughter states that at this time she would like to continue all interventions and to continue her mother's full CODE STATUS. On 3/22, this discussion was revisited after there was a new concern for bowel perforation. Daughter understands that patients prognosis is poor and would like patient to stay full code until she is able to visit patient within the next day.  -Ethics consult in place; recommend that there are competing ethic considerations and that we as " professionals should not feel compelled to violate professional judgment and perform interventions inappropriately  - Will pursue further goals of care discussions with family now that patient is responsive to commands.     General Cares/Prophylaxis:    DVT Prophylaxis: SCD's  GI Prophylaxis: PPI  Restraints: none  Family Communication: Daughter  Code Status: FULL code as discussed above     Lines/tubes/drains:  - Port  - Shahid  - Art line  - CVC triple lumen   - ET tube in place      Disposition:  - Medical ICU      Patient seen and findings/plan discussed with medical ICU staff, Dr. Ding.    Philippe Stockton MD  PGY-1    ====================================  INTERVAL HISTORY:   No changes overnight. Patient remains intubated and on 0.3-0.5 phenylephrine. In the afternoon 3/24, patient was noted to intermittently squeeze fingers on command and move eyes, which is a new finding.    OBJECTIVE:   1. VITAL SIGNS:   Temp:  [97.2  F (36.2  C)-98.6  F (37  C)] 98.6  F (37  C)  Pulse:  [47-73] 57  Resp:  [0-26] 12  BP: ()/(52-91) 98/69  MAP:  [4 mmHg-84 mmHg] 4 mmHg  Arterial Line BP: (102-128)/(47-58) 123/57  FiO2 (%):  [40 %] 40 %  SpO2:  [81 %-100 %] 99 %  FiO2 (%): 40 %, Resp: 12, Vent Mode: CMV/AC, Resp Rate (Set): 12 breaths/min, Tidal Volume (Set, mL): 420 mL, PEEP (cm H2O): 5 cmH2O, Resp Rate (Set): 12 breaths/min, Tidal Volume (Set, mL): 420 mL, PEEP (cm H2O): 5 cmH2O  2. INTAKE/ OUTPUT:   I/O last 3 completed shifts:  In: 2835.66 [I.V.:2835.66]  Out: 955 [Urine:955]    3. PHYSICAL EXAMINATION:    GEN: intubated, NAD  EYES: pupils small but round and reactive to light  HEENT:  Normocephalic, atraumatic, trachea midline, ETT secure  CV: RRR  PULM/CHEST: Clear breath sounds bilaterally, mechanically ventilated   GI: abdomen semi firm, distended, bowel sounds hypoactive  : groin/thighs excoriated  EXTREMITIES: leg wraps in place, 3+ edema   NEURO: Moves arms and legs spontaneously. Grabs fingers on command and  moves eyes.  SKIN: groin excoriation, leg wounds, leg wraps in place. See media tab       4. LABS:   Arterial Blood Gases   Recent Labs   Lab 03/22/25 2008 03/22/25  1802 03/22/25  1623 03/22/25  0102   PH 7.49* 7.47* 7.49* 7.46*   PCO2 23* 24* 22* 21*   PO2 180* 178* 182* 229*   HCO3 18* 17* 17* 15*     Complete Blood Count   Recent Labs   Lab 03/24/25 0327 03/23/25 2025 03/23/25  1108 03/23/25 0318   WBC 9.4 8.5 7.4 8.6   HGB 7.4* 7.7* 7.5* 8.3*   PLT 28* 29* 28* 31*     Basic Metabolic Panel  Recent Labs   Lab 03/24/25 0327 03/24/25 0326 03/23/25 2352 03/23/25 2024 03/23/25  0824 03/23/25 0318 03/22/25  1415 03/22/25 0328 03/21/25  1722 03/21/25  1717     --   --   --   --  139  --  138  --  136   POTASSIUM 3.7  --   --   --   --  4.1  --  4.5  --  4.0   CHLORIDE 112*  --   --   --   --  111*  --  112*  --  111*   CO2 15*  --   --   --   --  15*  --  13*  --  14*   BUN 18.0  --   --   --   --  19.1  --  18.5  --  16.9   CR 1.13*  --   --   --   --  1.14*  --  1.07*  --  0.91   GLC 95 119* 79 92   < > 85   < > 99   < > 157*    < > = values in this interval not displayed.     Liver Function Tests  Recent Labs   Lab 03/24/25 0327 03/23/25 0318 03/22/25 0328 03/21/25 1717 03/21/25  0427 03/21/25  0010 03/20/25  2150   AST 57* 69* 71* 57*   < > 38 30   ALT 31 32 34 28   < > 23 25   ALKPHOS 129 132 128 101   < > 102 113   BILITOTAL 1.3* 1.4* 0.9 0.8   < > 0.4 0.4   ALBUMIN 2.2* 2.7* 2.3* 2.2*   < > 1.5* 1.8*   INR  --   --   --  2.27*  --  2.39* 1.61*    < > = values in this interval not displayed.     Coagulation Profile  Recent Labs   Lab 03/21/25  1717 03/21/25  0010 03/20/25  2150   INR 2.27* 2.39* 1.61*   PTT 67* 128* 51*       5. RADIOLOGY:   No results found for this or any previous visit (from the past 24 hours).

## 2025-03-24 NOTE — PROGRESS NOTES
"  PALLIATIVE CARE PROGRESS NOTE  [facility]     Patient Name: Alis Hartman  Date of Admission: 3/4/2025   Today the patient was seen for: goals of care     Recommendations & Counseling     GOALS OF CARE:   Life-prolonging with plan to transition to comfort measures 3/25 PM  Participated in impromptu care conference this afternoon with Joy (daughter) and MICU team:  MICU team provided updates including that Aranza is more alert today, following commands, though overall remains critically ill in multisystem organ failure. Joy named that her mom \"is dying\" and said \"I know she isn't going to come home.\" She has been considering withdrawing life support but waiting for many family members to visit (more coming this evening). Reviewed current supports (ventilator, pressors, IV fluids, etc) and what it would look like to transition to comfort care. Joy voiced acceptance of her mom's condition and desire to avoid further suffering.   Developed plan for MICU team to call Joy tomorrow 3/25 afternoon to confirm family is ready to transition to comfort care including compassionate extubation. Joy does not want to be present for extubation, but wants to ensure her mom's sister is at bedside during the process. Discussed uncertainty around Aranza's prognosis after extubation (hours to days).  MICU team reviewed recommendation to change code status to DNR. Joy is in agreement with this but plans to check in with her brother Freedom by phone prior to changing code status. Recommend following up with Joy on code status before she leaves the hospital this evening.  Joy requested providers avoid calling her in the morning unless there are urgent needs.    ADVANCE CARE PLANNING:  Previously completed HCD naming Aranza (daughter) as HCA, appears this was not dated and deemed \"invalid.\" Aranza confirmed during last admission that Joy is her surrogate " decision maker per notes in chart. Joy has a brother Freedom and reportedly communicates with him regarding decisions for Aranza.  Code status: Full Code     PSYCHOSOCIAL/SPIRITUAL:  Family: Daughter (Joy) and grandchildren, son (Freedom), niece (Armida), siblings  Olivia: Mu-ism, appreciate  support    Palliative Care will continue to follow.     Kamilah De La Torre PA-C  [facility], Palliative Care  Securely message with the Vocera Web Console (learn more here) or  Text page via [facility] Paging/Directory      Assessment          Alis Hartman is a 69 year old female with a past medical history of cervical cancer s/p radiation, seriuos adenocarcinoma s/p SNEHA/BSO and cystotomy w/ suprapubic catheter placement 07/2024 and 3 cycles of carbo/taxol (10/2024), hx ESBL urosepsis and bacteremia, RNY gastric bypass, afib on apixaban, HTN, CKD stage 3, and multiple recent admissions (most recently 2/5-2/25 with MADHU, deconditioning, and malnutrition), re-admitted 3/4 with concern for UTI and failure to thrive/general weakness. Palliative following for symptom management and goals of care.      Interval History:     Multidisciplinary collaboration:  Remains in ICU, concern for gastric ulcer perforation on CT but no plans for endoscopy or surgical intervention given high risk  Discussed with MICU team who have been trying to connect with family. Reportedly there was discussion of transitioning to comfort measures over the weekend.    Patient/family narrative  Saw twice today - this morning Aranza was unresponsive, this afternoon her eyes were open and tracking when daughter was present. Reportedly following commands.     Physical Exam:   Temp:  [97.2  F (36.2  C)-99  F (37.2  C)] 97.2  F (36.2  C)  Pulse:  [47-78] 56  Resp:  [0-26] 12  BP: ()/(52-91) 94/67  MAP:  [4 mmHg-84 mmHg] 4 mmHg  Arterial Line BP: (102-128)/(47-58) 123/57  FiO2 (%):  [40 %] 40 %  SpO2:  [81 %-100 %] 92 %  145 lbs 11.58 oz    Gen: laying in  bed, eyes open and interactive with family        Data Reviewed:     CT abdomen/pelvis 3/21  IMPRESSION:   1.  Questionable discontinuity of the anterior aspect of the gastric  pouch, concerning for perforated marginal ulcer. Consider further  evaluation with endoscopy.  2.  Diffuse rectal wall thickening, which may represent nonspecific  proctitis or mucosal edema secondary to fluid resuscitation.  3.  Sequela of volume overload including bilateral pleural effusions,  ascites, extensive mesenteric edema, and diffuse anasarca.  4.  Additional chronic and incidental findings as described in the  body of the report.    CMP  Recent Labs   Lab 03/24/25  1148 03/24/25  0327 03/23/25  0824 03/23/25  0318 03/22/25  1415 03/22/25  0328 03/21/25  1211 03/21/25  1203 03/21/25  0249 03/21/25  0010   NA  --  140  --  139  --  138   < >  --    < > 147*   POTASSIUM  --  3.7  --  4.1  --  4.5   < > 4.6   < > 4.0   CHLORIDE  --  112*  --  111*  --  112*   < >  --    < > 117*   CO2  --  15*  --  15*  --  13*   < >  --    < > 24   ANIONGAP  --  13  --  13  --  13   < >  --    < > 6*   GLC 78 95   < > 85   < > 99   < >  --    < > 71   BUN  --  18.0  --  19.1  --  18.5   < >  --    < > 10.6   CR  --  1.13*  --  1.14*  --  1.07*   < >  --    < > 0.76   GFRESTIMATED  --  52*  --  51*  --  55*   < >  --    < > 83   SAMUEL  --  7.8*  --  8.2*  --  7.1*   < >  --    < > 6.1*   MAG  --   --   --   --   --  2.2  --  2.4*  --  1.6*   PHOS  --   --   --   --   --  3.2  --   --   --  2.1*   PROTTOTAL  --  3.6*  --  4.0*  --  3.7*   < >  --    < > 2.6*   ALBUMIN  --  2.2*  --  2.7*  --  2.3*   < >  --    < > 1.5*   BILITOTAL  --  1.3*  --  1.4*  --  0.9   < >  --    < > 0.4   ALKPHOS  --  129  --  132  --  128   < >  --    < > 102   AST  --  57*  --  69*  --  71*   < >  --    < > 38   ALT  --  31  --  32  --  34   < >  --    < > 23    < > = values in this interval not displayed.     CBC  Recent Labs   Lab 03/24/25  0327 03/23/25 2025   WBC 9.4 8.5    RBC 2.41* 2.53*   HGB 7.4* 7.7*   HCT 22.4* 23.5*   MCV 93 93   MCH 30.7 30.4   MCHC 33.0 32.8   RDW 21.7* 21.6*   PLT 28* 29*     Medical Decision Making       MANAGEMENT DISCUSSED with the following over the past 24 hours: MICU   NOTE(S)/MEDICAL RECORDS REVIEWED over the past 24 hours: MICU  60 MINUTES SPENT BY ME on the date of service doing chart review, history, exam, documentation & further activities per the note.

## 2025-03-25 DIAGNOSIS — N32.81 OAB (OVERACTIVE BLADDER): Primary | ICD-10-CM

## 2025-03-25 LAB
ABO + RH BLD: NORMAL
ALBUMIN SERPL BCG-MCNC: 2.9 G/DL (ref 3.5–5.2)
ALP SERPL-CCNC: 98 U/L (ref 40–150)
ALT SERPL W P-5'-P-CCNC: 25 U/L (ref 0–50)
ANION GAP SERPL CALCULATED.3IONS-SCNC: 14 MMOL/L (ref 7–15)
AST SERPL W P-5'-P-CCNC: 43 U/L (ref 0–45)
BASOPHILS # BLD AUTO: 0 10E3/UL (ref 0–0.2)
BASOPHILS NFR BLD AUTO: 0 %
BILIRUB SERPL-MCNC: 1.5 MG/DL
BLD GP AB SCN SERPL QL: NEGATIVE
BLD PROD TYP BPU: NORMAL
BLOOD COMPONENT TYPE: NORMAL
BUN SERPL-MCNC: 16.4 MG/DL (ref 8–23)
CALCIUM SERPL-MCNC: 7.6 MG/DL (ref 8.8–10.4)
CHLORIDE SERPL-SCNC: 106 MMOL/L (ref 98–107)
CODING SYSTEM: NORMAL
CREAT SERPL-MCNC: 1.04 MG/DL (ref 0.51–0.95)
CROSSMATCH: NORMAL
EGFRCR SERPLBLD CKD-EPI 2021: 57 ML/MIN/1.73M2
EOSINOPHIL # BLD AUTO: 0 10E3/UL (ref 0–0.7)
EOSINOPHIL NFR BLD AUTO: 1 %
ERYTHROCYTE [DISTWIDTH] IN BLOOD BY AUTOMATED COUNT: 21.2 % (ref 10–15)
GLUCOSE BLDC GLUCOMTR-MCNC: 138 MG/DL (ref 70–99)
GLUCOSE BLDC GLUCOMTR-MCNC: 82 MG/DL (ref 70–99)
GLUCOSE BLDC GLUCOMTR-MCNC: 82 MG/DL (ref 70–99)
GLUCOSE SERPL-MCNC: 146 MG/DL (ref 70–99)
HCO3 SERPL-SCNC: 15 MMOL/L (ref 22–29)
HCT VFR BLD AUTO: 18.9 % (ref 35–47)
HGB BLD-MCNC: 6.2 G/DL (ref 11.7–15.7)
IMM GRANULOCYTES # BLD: 0.1 10E3/UL
IMM GRANULOCYTES NFR BLD: 1 %
ISSUE DATE AND TIME: NORMAL
LACTATE SERPL-SCNC: 2.3 MMOL/L (ref 0.7–2)
LYMPHOCYTES # BLD AUTO: 0.9 10E3/UL (ref 0.8–5.3)
LYMPHOCYTES NFR BLD AUTO: 15 %
MAGNESIUM SERPL-MCNC: 1.9 MG/DL (ref 1.7–2.3)
MCH RBC QN AUTO: 31.3 PG (ref 26.5–33)
MCHC RBC AUTO-ENTMCNC: 32.8 G/DL (ref 31.5–36.5)
MCV RBC AUTO: 96 FL (ref 78–100)
MONOCYTES # BLD AUTO: 0.2 10E3/UL (ref 0–1.3)
MONOCYTES NFR BLD AUTO: 3 %
NEUTROPHILS # BLD AUTO: 5.2 10E3/UL (ref 1.6–8.3)
NEUTROPHILS NFR BLD AUTO: 82 %
NRBC # BLD AUTO: 0 10E3/UL
NRBC BLD AUTO-RTO: 0 /100
PHOSPHATE SERPL-MCNC: 2.6 MG/DL (ref 2.5–4.5)
PLATELET # BLD AUTO: 20 10E3/UL (ref 150–450)
POTASSIUM SERPL-SCNC: 3 MMOL/L (ref 3.4–5.3)
PROT SERPL-MCNC: 4 G/DL (ref 6.4–8.3)
RBC # BLD AUTO: 1.98 10E6/UL (ref 3.8–5.2)
SODIUM SERPL-SCNC: 135 MMOL/L (ref 135–145)
SODIUM SERPL-SCNC: 136 MMOL/L (ref 135–145)
SPECIMEN EXP DATE BLD: NORMAL
UNIT ABO/RH: NORMAL
UNIT NUMBER: NORMAL
UNIT STATUS: NORMAL
UNIT TYPE ISBT: 5100
WBC # BLD AUTO: 6.4 10E3/UL (ref 4–11)

## 2025-03-25 PROCEDURE — 250N000011 HC RX IP 250 OP 636: Mod: JZ

## 2025-03-25 PROCEDURE — 99291 CRITICAL CARE FIRST HOUR: CPT | Mod: GC | Performed by: INTERNAL MEDICINE

## 2025-03-25 PROCEDURE — 200N000002 HC R&B ICU UMMC

## 2025-03-25 PROCEDURE — 258N000003 HC RX IP 258 OP 636: Performed by: INTERNAL MEDICINE

## 2025-03-25 PROCEDURE — 86923 COMPATIBILITY TEST ELECTRIC: CPT

## 2025-03-25 PROCEDURE — 83735 ASSAY OF MAGNESIUM: CPT

## 2025-03-25 PROCEDURE — 84295 ASSAY OF SERUM SODIUM: CPT

## 2025-03-25 PROCEDURE — 250N000011 HC RX IP 250 OP 636: Performed by: INTERNAL MEDICINE

## 2025-03-25 PROCEDURE — 94003 VENT MGMT INPAT SUBQ DAY: CPT

## 2025-03-25 PROCEDURE — 86900 BLOOD TYPING SEROLOGIC ABO: CPT

## 2025-03-25 PROCEDURE — P9016 RBC LEUKOCYTES REDUCED: HCPCS

## 2025-03-25 PROCEDURE — 999N000157 HC STATISTIC RCP TIME EA 10 MIN

## 2025-03-25 PROCEDURE — P9047 ALBUMIN (HUMAN), 25%, 50ML: HCPCS | Mod: JZ

## 2025-03-25 PROCEDURE — 258N000001 HC RX 258

## 2025-03-25 PROCEDURE — 250N000011 HC RX IP 250 OP 636

## 2025-03-25 PROCEDURE — 250N000011 HC RX IP 250 OP 636: Performed by: NURSE PRACTITIONER

## 2025-03-25 PROCEDURE — 83605 ASSAY OF LACTIC ACID: CPT

## 2025-03-25 PROCEDURE — 258N000003 HC RX IP 258 OP 636

## 2025-03-25 PROCEDURE — 250N000013 HC RX MED GY IP 250 OP 250 PS 637

## 2025-03-25 PROCEDURE — 250N000009 HC RX 250

## 2025-03-25 PROCEDURE — 250N000011 HC RX IP 250 OP 636: Mod: JZ | Performed by: INTERNAL MEDICINE

## 2025-03-25 PROCEDURE — 84100 ASSAY OF PHOSPHORUS: CPT

## 2025-03-25 PROCEDURE — 999N000253 HC STATISTIC WEANING TRIALS

## 2025-03-25 PROCEDURE — 250N000011 HC RX IP 250 OP 636: Performed by: STUDENT IN AN ORGANIZED HEALTH CARE EDUCATION/TRAINING PROGRAM

## 2025-03-25 PROCEDURE — 99233 SBSQ HOSP IP/OBS HIGH 50: CPT | Performed by: PHYSICIAN ASSISTANT

## 2025-03-25 PROCEDURE — 85014 HEMATOCRIT: CPT

## 2025-03-25 RX ORDER — POTASSIUM CHLORIDE 29.8 MG/ML
20 INJECTION INTRAVENOUS
Status: COMPLETED | OUTPATIENT
Start: 2025-03-25 | End: 2025-03-25

## 2025-03-25 RX ORDER — MAGNESIUM SULFATE HEPTAHYDRATE 40 MG/ML
2 INJECTION, SOLUTION INTRAVENOUS ONCE
Status: COMPLETED | OUTPATIENT
Start: 2025-03-25 | End: 2025-03-25

## 2025-03-25 RX ORDER — DEXTROSE AND SODIUM CHLORIDE 10; .45 G/100ML; G/100ML
INJECTION, SOLUTION INTRAVENOUS CONTINUOUS
Status: DISCONTINUED | OUTPATIENT
Start: 2025-03-25 | End: 2025-03-29

## 2025-03-25 RX ADMIN — MAGNESIUM SULFATE HEPTAHYDRATE 2 G: 2 INJECTION, SOLUTION INTRAVENOUS at 08:02

## 2025-03-25 RX ADMIN — CHLORHEXIDINE GLUCONATE 15 ML: 1.2 SOLUTION ORAL at 07:57

## 2025-03-25 RX ADMIN — POTASSIUM CHLORIDE 20 MEQ: 29.8 INJECTION, SOLUTION INTRAVENOUS at 07:58

## 2025-03-25 RX ADMIN — ALBUMIN HUMAN 25 G: 0.25 SOLUTION INTRAVENOUS at 10:36

## 2025-03-25 RX ADMIN — Medication 25 MCG: at 21:39

## 2025-03-25 RX ADMIN — Medication 1 SPRAY: at 21:51

## 2025-03-25 RX ADMIN — LEVETIRACETAM 750 MG: 500 INJECTION, SOLUTION INTRAVENOUS at 23:25

## 2025-03-25 RX ADMIN — LINEZOLID 600 MG: 600 INJECTION, SOLUTION INTRAVENOUS at 04:44

## 2025-03-25 RX ADMIN — LEVETIRACETAM 750 MG: 500 INJECTION, SOLUTION INTRAVENOUS at 09:03

## 2025-03-25 RX ADMIN — POTASSIUM CHLORIDE 20 MEQ: 29.8 INJECTION, SOLUTION INTRAVENOUS at 09:39

## 2025-03-25 RX ADMIN — PIPERACILLIN AND TAZOBACTAM 4.5 G: 4; .5 INJECTION, POWDER, LYOPHILIZED, FOR SOLUTION INTRAVENOUS at 10:35

## 2025-03-25 RX ADMIN — POTASSIUM CHLORIDE 20 MEQ: 29.8 INJECTION, SOLUTION INTRAVENOUS at 06:28

## 2025-03-25 RX ADMIN — THIAMINE HYDROCHLORIDE 250 MG: 100 INJECTION, SOLUTION INTRAMUSCULAR; INTRAVENOUS at 07:57

## 2025-03-25 RX ADMIN — Medication 0.3 MCG/KG/MIN: at 01:56

## 2025-03-25 RX ADMIN — PANTOPRAZOLE SODIUM 40 MG: 40 INJECTION, POWDER, FOR SOLUTION INTRAVENOUS at 07:57

## 2025-03-25 RX ADMIN — PIPERACILLIN AND TAZOBACTAM 4.5 G: 4; .5 INJECTION, POWDER, LYOPHILIZED, FOR SOLUTION INTRAVENOUS at 03:47

## 2025-03-25 RX ADMIN — CHLORHEXIDINE GLUCONATE 15 ML: 1.2 SOLUTION ORAL at 21:58

## 2025-03-25 RX ADMIN — Medication 25 MCG: at 08:13

## 2025-03-25 RX ADMIN — DEXTROSE AND SODIUM CHLORIDE: 10; .45 INJECTION, SOLUTION INTRAVENOUS at 09:07

## 2025-03-25 RX ADMIN — PIPERACILLIN AND TAZOBACTAM 4.5 G: 4; .5 INJECTION, POWDER, LYOPHILIZED, FOR SOLUTION INTRAVENOUS at 21:58

## 2025-03-25 RX ADMIN — DEXTROSE: 10 SOLUTION INTRAVENOUS at 04:45

## 2025-03-25 RX ADMIN — ALBUMIN HUMAN 25 G: 0.25 SOLUTION INTRAVENOUS at 01:36

## 2025-03-25 ASSESSMENT — ACTIVITIES OF DAILY LIVING (ADL)
ADLS_ACUITY_SCORE: 96
ADLS_ACUITY_SCORE: 100
ADLS_ACUITY_SCORE: 96
ADLS_ACUITY_SCORE: 96
ADLS_ACUITY_SCORE: 102
ADLS_ACUITY_SCORE: 100
ADLS_ACUITY_SCORE: 100
ADLS_ACUITY_SCORE: 96
ADLS_ACUITY_SCORE: 100
ADLS_ACUITY_SCORE: 96
ADLS_ACUITY_SCORE: 96
ADLS_ACUITY_SCORE: 100
ADLS_ACUITY_SCORE: 96
ADLS_ACUITY_SCORE: 96
ADLS_ACUITY_SCORE: 100
ADLS_ACUITY_SCORE: 96
ADLS_ACUITY_SCORE: 100
ADLS_ACUITY_SCORE: 96
ADLS_ACUITY_SCORE: 96

## 2025-03-25 NOTE — INTERIM SUMMARY
Spoke with patient's Daughter, Joy, and Son, Hemanth. Discussed overall prognosis, agree with their desire for as much time with Aranza but to avoid suffering and pain. In light of this as well as her requests/wishes, plan is to extubate to comfort focused care this evening with family present. Family agreed that avoiding suffering is important to Aranza and that CPR would cause suffering, did not dissent to recommendation for DNR status upon transition to comfort care.     Anticipate that Aranza will survive to spend time with family but anticipate she has hours to days on comfort care.     Recommend:  -fentanyl and prn benzo for pain and air hunger - titrated for alertness but no dyspnea/distress  -PO intake as desired  -Nasal cannula for comfort  -discontinue pressors and do not escalate cares  -DNR/DNI    Ellen Monzon MD, MD on 3/25/2025 at 6:18 PM  PAC[employer] Fellow

## 2025-03-25 NOTE — PROGRESS NOTES
PALLIATIVE CARE PROGRESS NOTE  [facility]     Patient Name: Alis Hartman  Date of Admission: 3/4/2025   Today the patient was seen for: goals of care     Recommendations & Counseling     GOALS OF CARE:   Life-prolonging, previously planned to transition to comfort care 3/25 PM but daughter reportedly changed her mind and abruptly left the hospital  Discussed with MICU and Ethics. Recommend involving additional family in further decision making to support Joy. Hemanth (son) has reportedly been involved and was present for conversations 3/25 PM.  Plan for care conference tomorrow 3/27 with Joy and Hemanth either in person or by phone. Appreciate RNCC assistance in coordinating.    ADVANCE CARE PLANNING:  Previously completed HCD naming Joy (daughter) as HCA, appears this was not dated and deemed invalid. Joy has been serving as surrogate decision maker with input from her brother Hemanth (patient's son).  Code status: Full Code     PSYCHOSOCIAL/SPIRITUAL:  Family: Daughter (Joy) and grandchildren, son (Freedom), niece (Armida), siblings  Olivia: Protestant, appreciate  support    Palliative Care will continue to follow.     Kamilah De La Torre PA-C  [facility], Palliative Care  Securely message with the [employer] Web Console (learn more here) or  Text page via [facility] Paging/Directory      Assessment          Alis Hartman is a 69 year old female with a past medical history of cervical cancer s/p radiation, seriuos adenocarcinoma s/p SNEHA/BSO and cystotomy w/ suprapubic catheter placement 07/2024 and 3 cycles of carbo/taxol (10/2024), hx ESBL urosepsis and bacteremia, RNY gastric bypass, afib on apixaban, HTN, CKD stage 3, and multiple recent admissions (most recently 2/5-2/25 with [facility], deconditioning, and malnutrition), re-admitted 3/4 with concern for UTI and failure to thrive/general weakness. Palliative following for symptom management and goals of  care.      Interval History:     Multidisciplinary collaboration:  Discussed with MICU team and Ethics    Patient/family narrative  Seen morning with Palliative . Aranza is laying in bed, eyes open and tracking, remains on vent, lethargic. Holding white board in her hands but does not appear able to write on the board. Nods head when asked if she's in pain.      Physical Exam:   Temp:  [94.8  F (34.9  C)-97.7  F (36.5  C)] 97.4  F (36.3  C)  Pulse:  [43-82] 74  Resp:  [11-24] 12  BP: ()/(49-96) 83/49  FiO2 (%):  [30 %-40 %] 40 %  SpO2:  [75 %-100 %] 100 %  145 lbs 11.58 oz    Gen: laying in bed, eyes open and tracking but lethargic  Lungs: intubated and ventilated  Neuro: eyes open and tracking, lethargic, delayed responses, nodding very slightly in response to questions, following commands  Extremities: diffuse edema        Data Reviewed:     CMP  Recent Labs   Lab 03/25/25  0754 03/25/25  0527 03/25/25  0522 03/25/25  0240 03/24/25  1148 03/24/25  0327 03/22/25  1415 03/22/25  0328   NA  --   --  135 136  --  140   < > 138   POTASSIUM  --   --  3.0*  --   --  3.7   < > 4.5   CHLORIDE  --   --  106  --   --  112*   < > 112*   CO2  --   --  15*  --   --  15*   < > 13*   ANIONGAP  --   --  14  --   --  13   < > 13   GLC 82 138* 146*  --    < > 95   < > 99   BUN  --   --  16.4  --   --  18.0   < > 18.5   CR  --   --  1.04*  --   --  1.13*   < > 1.07*   GFRESTIMATED  --   --  57*  --   --  52*   < > 55*   SAMUEL  --   --  7.6*  --   --  7.8*   < > 7.1*   MAG  --   --  1.9  --   --   --   --  2.2   PHOS  --   --  2.6  --   --   --   --  3.2   PROTTOTAL  --   --  4.0*  --   --  3.6*   < > 3.7*   ALBUMIN  --   --  2.9*  --   --  2.2*   < > 2.3*   BILITOTAL  --   --  1.5*  --   --  1.3*   < > 0.9   ALKPHOS  --   --  98  --   --  129   < > 128   AST  --   --  43  --   --  57*   < > 71*   ALT  --   --  25  --   --  31   < > 34    < > = values in this interval not displayed.     CBC  Recent Labs   Lab 03/25/25  5558  03/24/25 2053   WBC 6.4 9.1   RBC 1.98* 2.25*   HGB 6.2* 6.9*   HCT 18.9* 21.5*   MCV 96 96   MCH 31.3 30.7   MCHC 32.8 32.1   RDW 21.2* 21.7*   PLT 20* 24*     Medical Decision Making       MANAGEMENT DISCUSSED with the following over the past 24 hours: MICU   NOTE(S)/MEDICAL RECORDS REVIEWED over the past 24 hours: MICU  60 MINUTES SPENT BY ME on the date of service doing chart review, history, exam, documentation & further activities per the note.

## 2025-03-25 NOTE — PLAN OF CARE
Lethargic, arouses to voice. Following commands. Sinus Mauro/Sinus Rhythm with HRs 60-80. Frequent turns for weeping wound cares, restless, fentanyl gtt at 50 mcg/hr. MAPs greater than 65 maintained with phenylephrine at 0.3 mcg/kg/min. Endotracheal tube with CMV settings: 40%, RR: 12, TV: 420, PEEP: 5.  Adequate UOP via suprapubic catheter. NJ clamped. D10 infusing at 50 mL/hr. LA down-trendin.3. Hgb: 6.2, platelets: 20; holding transfusions for now per team.     Plan: Titrate pressors as able. Awaiting response from family re: code status and possible extubation today.       Goal Outcome Evaluation:      Plan of Care Reviewed With: patient    Overall Patient Progress: decliningOverall Patient Progress: declining         Problem: Adult Inpatient Plan of Care  Goal: Plan of Care Review  Description: The Plan of Care Review/Shift note should be completed every shift.  The Outcome Evaluation is a brief statement about your assessment that the patient is improving, declining, or no change.  This information will be displayed automatically on your shift  note.  Outcome: Not Progressing  Flowsheets (Taken 3/25/2025 0532)  Plan of Care Reviewed With: patient  Overall Patient Progress: declining     Problem: Skin Injury Risk Increased  Goal: Skin Health and Integrity  Outcome: Not Progressing  Intervention: Plan: Nurse Driven Intervention: Positioning  Recent Flowsheet Documentation  Taken 3/25/2025 0000 by Luca Quintero, RN  Plan: Positioning Interventions:   REPOSITION Left/Right (No supine) q2h   HOB 30 degrees or less   OFF-LOAD HEELS with pillows  Intervention: Plan: Nurse Driven Intervention: Moisture Management  Recent Flowsheet Documentation  Taken 3/25/2025 0400 by Luca Quintero, RN  Bathing/Skin Care:   bath, partial   moisturizer applied   dressed/undressed   linen changed  Taken 3/25/2025 0000 by Luca Quintero, RN  Moisture Interventions:   No brief in bed   Incontinence pad   Urinary collection device   Barrier  ointment (CriticAid, Triad paste)   Wicking textile in skin fold (InterDry)  Bathing/Skin Care:   bath, partial   moisturizer applied   dressed/undressed   linen changed  Intervention: Plan: Nurse Driven Intervention: Friction and Shear  Recent Flowsheet Documentation  Taken 3/25/2025 0000 by Luca Quintero RN  Friction/Shear Interventions:   HOB 30 degrees or less   Assistive lifting device (portable/ceiling lift, etc.)  Intervention: Optimize Skin Protection  Recent Flowsheet Documentation  Taken 3/25/2025 0400 by Luca Quintero RN  Activity Management: bedrest  Head of Bed (HOB) Positioning: HOB at 30 degrees  Taken 3/25/2025 0200 by Luca Quintero RN  Head of Bed (HOB) Positioning: HOB at 30 degrees  Taken 3/25/2025 0000 by Luca Quintero RN  Pressure Reduction Techniques:   heels elevated off bed   weight shift assistance provided  Skin Protection:   incontinence pads utilized   skin sealant/moisture barrier applied  Activity Management: bedrest  Head of Bed (HOB) Positioning: HOB at 30 degrees     Problem: Skin Injury Risk Increased  Goal: Skin Health and Integrity  Intervention: Optimize Skin Protection  Recent Flowsheet Documentation  Taken 3/25/2025 0400 by Luca Quintero RN  Activity Management: bedrest  Head of Bed (HOB) Positioning: HOB at 30 degrees  Taken 3/25/2025 0200 by Luca Quintero RN  Head of Bed (HOB) Positioning: HOB at 30 degrees  Taken 3/25/2025 0000 by Luca Quintero RN  Pressure Reduction Techniques:   heels elevated off bed   weight shift assistance provided  Skin Protection:   incontinence pads utilized   skin sealant/moisture barrier applied  Activity Management: bedrest  Head of Bed (HOB) Positioning: HOB at 30 degrees     Problem: Delirium  Goal: Optimal Coping  Outcome: Not Progressing  Intervention: Optimize Psychosocial Adjustment to Delirium  Recent Flowsheet Documentation  Taken 3/25/2025 0400 by Luca Quintero RN  Supportive Measures: relaxation techniques promoted  Family/Support System Care: presence  promoted  Taken 3/25/2025 0000 by Luca Quintero RN  Supportive Measures: relaxation techniques promoted  Family/Support System Care: presence promoted     Problem: Pain Acute  Goal: Optimal Pain Control and Function  Outcome: Not Progressing  Intervention: Optimize Psychosocial Wellbeing  Recent Flowsheet Documentation  Taken 3/25/2025 0400 by Luca Quintero RN  Supportive Measures: relaxation techniques promoted  Taken 3/25/2025 0000 by Luca Quintero RN  Supportive Measures: relaxation techniques promoted  Intervention: Develop Pain Management Plan  Recent Flowsheet Documentation  Taken 3/25/2025 0400 by Luca Quintero RN  Pain Management Interventions:   care clustered   diversional activity provided   emotional support   distraction   environmental changes   massage provided   relaxation techniques promoted   repositioned   rest   therapeutic presence   therapeutic touch   medication (see MAR)  Taken 3/25/2025 0000 by Luca Quintero RN  Pain Management Interventions:   care clustered   diversional activity provided   emotional support   distraction   environmental changes   massage provided   relaxation techniques promoted   repositioned   rest   therapeutic presence   therapeutic touch   medication (see MAR)  Intervention: Prevent or Manage Pain  Recent Flowsheet Documentation  Taken 3/25/2025 0400 by Luca Quintero RN  Sensory Stimulation Regulation:   lighting decreased   care clustered   quiet environment promoted  Sleep/Rest Enhancement:   regular sleep/rest pattern promoted   relaxation techniques promoted   therapeutic touch utilized  Complementary Therapy: music therapy provided  Medication Review/Management:   medications reviewed   high-risk medications identified  Taken 3/25/2025 0000 by Luca Quintero RN  Sensory Stimulation Regulation:   lighting decreased   care clustered   quiet environment promoted  Sleep/Rest Enhancement:   regular sleep/rest pattern promoted   relaxation techniques promoted   therapeutic touch  utilized  Complementary Therapy: music therapy provided  Medication Review/Management:   medications reviewed   high-risk medications identified

## 2025-03-25 NOTE — PROGRESS NOTES
Medical ICU PROGRESS NOTE  03/25/2025      Date of Service (when I saw the patient): 03/25/2025    ASSESSMENT: Alis Hartman is a 69 year old female with PMH of cervical cancer s/p radiation, serous endometrial adenocarcinoma s/p SNEHA/BSO (hysterectomy/ovarian removal) and cystotomy w/ suprapubic catheter placement (7/2024) as well as several cycles of carbo/taxel (10/2024), hx ESBL urosepsis and bacteremia, RNY gastric bypass, afib not on apixaban (stopped due to vaginal bleeding), HTN, CKD3 who was initially admitted with increased confusion, poor appetite and generalized weakness.  Her Hospital course complicated by MADHU, hypervolemia, acute on chronic pain, suspected dementia, and decreased oral intake with severe malnutrition on 3/20 there was plan for discharge with home hospice with continuation of medical cares to maximize patient's remaining time.  Patient began requiring higher level of oxygen support that was unable to be met by BiPAP on the evening of 3/20 and a rapid was called and after extensive discussion with family patient was intubated for airway protection. Patient had PEA arrest x2 following intubation. Now with concern for gastric ulcer perforation. Now partial comfort cares.    CHANGES and MAJOR THINGS TODAY:   - Transitioning to comfort cares in the afternoon with family present  - Fentanyl infusion for light sedation/comfort    PLAN:    Neuro:  # Pain and sedation  - RASS goal 0 to -1  - Fentanyl infusion  - buprenophine patch on hold     # C/f CVA  # C/f seizures  # New left eye deviation  # small age indeterminate (possibly recent) lacunar infarcts in bilateral thalami  # bilateral tremors of upper extremities  Patient with code stroke called due to acute mental status change and new left eye gaze deviation along with bilateral upper extremity tremors R>L.  Patient was unable to initially receive head CT due to increasing oxygen requirements. She then had a PEA arrest. Did start  some head imaging, however then coded a second time during the scan. Now responding to commands, moving eyes.  -CT head w/o: small age indeterminate (possibly recent) lacunar infarcts in bilateral thalami, no hemorrhage or mass effect  -CT head perfusion w/ contrast: not able to be completed  -CTA head and neck when stable: poor study, patient was coding during scan  -Received 1x Ativan 4 mg  -Received Keppra loading dose  -Neurology consulted with initial recs being:               - S/p keppra 3000mg IV load  - no further workup at this time pending [facility]  -Neurocrit is consulted to assess for seizures, EEG, and to recommend if we should get any further head imaging.   - Moderately diffuse nonspecific encephalopathy on EEG   - signed off at this time     # Hypothermia  Possible due to shock, post cardiac arrest. Could be related to other etiology such as sepsis.   - has bare hugger on  - cortisol and TSH normal     # Mixed etiology cognitive impairment: contributions from delirium, metabolic encephalopathy, dementia, schizophrenia   Patient initially presenting with confusion.  Has history of paranoia with suspected hallucinations.  Has also had a general decline with refusing to eat and poor nutrition for the past several days.  Patient's encephalopathy likely multifactorial given her poor oral intake, psychiatric history, and dementia component.  Feeding tube attempted on 3/14, but unable to be placed due to disorientation and difficulty with laying flat.  -IV thiamine   -Continue D10 fluids for now, nutrition plan as below  - zyprexa on hold      # Acute on Chronic Pain  Thigh, low back pain related to her coccyx.  Patient was started on buprenorphine patch.  Palliative following.  -Hold buprenorphine patch  -will monitor for signs of pain, currently would be ideal to not sedate patient to allow for an accurate neurological exam     Pulmonary:  # Acute hypoxemia respiratory failure  # Bilateral pleural  effusions  Patient with increasing acute hypoxemia on BiPAP requiring intubation. Etiology remains unclear. Could consider PE as CXR was relatively unremarkable. Bedside POCUS not strongly convincing for right heart strain. CT chest shows no PE, but large bilateral pleural effusions present.  - vent bundle  - Attempt SBP now that patient is following commands  - Consider thoracentesis based on goals of care    FiO2 (%): 40 %, Resp: 13, Vent Mode: CMV/AC, Resp Rate (Set): 12 breaths/min, Tidal Volume (Set, mL): 420 mL, PEEP (cm H2O): 5 cmH2O, Pressure Support (cm H2O): 5 cmH2O, Resp Rate (Set): 12 breaths/min, Tidal Volume (Set, mL): 420 mL, PEEP (cm H2O): 5 cmH2O    Cardiovascular:  # Shock, likely multifactorial, functional outflow tract obstruction noted on bedside POCUS in setting of hypovolemia, possible septic component as well  Patient requiring pressor support.  Had received fluid boluses during rapid response.  Bedside POCUS on 3/21 showed functional outflow tract obstruction in setting of hypovolemia. Formal TTE on 3/21 (done after an additional 1L of LR and 500 ml albumin) showed global and regional left ventricular function is normal with an EF of 60-65%, thickening of anterobasal septal is present, left ventricular filling pressures are increased. Global right ventricular function is normal, Mild to moderate tricuspid insufficiency is present. The right ventricular systolic pressure is 31mmHg above the right atrial pressure.Has significant third spacing, however, given outflow tract obstruction, volume depletion with diuresis risks worsening hypotension. Will continue to weigh risks/benefits of diuresis.  - Continue abx as discussed below  - Phenylephrine  - MAP goal >65     # PEA arrest x2  Patient with PEA arrest that occurred after intubation and once while in the CT scanner.  Patient achieved ROSC after several rounds of CPR. Suspect arrest was related to hypoxia and functional outflow tract  obstruction.   -Plan for comfort care upon family's arrival.      # Elevated troponin   Elevated troponins, likely in the setting of demand ischemia. ECG   - no wall motion abnormalities noted on TTE report     # Hx of Afib   Rate controlled. Prior admission had risk/benefit discussion and discontinued AC due to vaginal bleeding.         GI/Nutrition:  #C/f gastric ulcer perforation  3/21 CT shows discontinuity of anterior aspect of the gastric pouch, concerning for perforated marginal ulcer. Patient has had a 2 gm Hgb drop within the past day. Discussed case with GI, who does not recommend EGD due to high risk and no efficacy/clinical utility. Per General surgery, patient is not a surgical candidate given their acute illness. Discussed with family and it was agreed to transfuse blood, but not escalate cares given overall clinical picture.  - Hgb checks Q8h, transfuse Hgb<7  - Current antibiotic coverage is sufficient     # Severe Protein-Calorie Malnutrition   # Decreased oral intake/Refusal to eat   # Hypoglycemia  # Hx Kiko en y  # Hypoalbuminemia  Patient has been refusing to eat, has not had good nutrition for several days. Calorie counts 3/11 to 3/15 showed 0 intake.   - Continue D10,  with TF off, can discontinue if/when decision to transition to comfort   - Nutrition consulted, appreciate assistance  - IV PPI      Renal/Fluids/Electrolytes:  #MADHU, oliguric, likely secondary to poor oral intake/malnutrition, renal labs currently stable  #Anasarca  #Dependent facial edema  # Hypokalemia  Creatinine at baseline 0.8-0.9. Renal US normal.  - Nephrology consulted this admission, signed off 3/16  - Lymphedema consulted, appreciate assistance  - Strict I's and O's, daily weights  - Monitor BMP     - RN managed electrolyte replacements     Endocrine:  # Hypoglycemia   Previously patient has been hypoglycemic likely due to poor oral intake  -D10 fluids as above      ID:  # Pyuria, VRE in urine culture   # Hx of ESBL  urosepsis and bacteremia  # Bladder dysfunction s/p cystostomy and suprapubic catheter  Recently admitted 11/26 - 12/8/2024 for ESBL urosepsis and bacteremia requiring ICU w/ left nephrostomy tube (removed 1/7) treated w/ ertapenem, returned 1/25-1/27 for concern for UTI but no clear infection at that time. Urology collected UA during SPC exchange on 3/17 due to turbid urine and this is now growing VRE.   - Suprapubic catheter exchanged by urology on 3/17  - Continue linezolid for VRE   - Continue zosyn for asp pna      Hematology:    #Acute on chronic anemia likely secondary to chronic disease  #Thrombocytopenia, HIT ruled out  #Leukopenia (currently resolved)  # Leukocyctosis  Did require pRBC transfusion on 3/13. No signs of bleeding. Platelets have now decreased significantly from ~150 to <50. There was concern for HIT, but screening panel returned negative. Now also with leukopenia. Likely this is related to overall clinical decline and malnutrition, but will investigate other causes. On 3/22, 2 gm Hgb drop in 24 hours, concern for bowel perforation. Not a surgical candidate.  - Holding lovenox/heparin  - Smear sent  - Transfuse as needed for Hgb <7, Plt <10k     Oncology:  # Serous endometrial carcinoma  Follows w/ heme/onc through [facility]. S/p SNEHA, BSO 07/2024 s/p cycle 3 carbo/taxel 10/15/2024, cycle 4 delayed in s/o recent admission, family ultimately decided to forgo any further treatment. During a care conference this admission, it was expressed that her cancer is likely to recur due to it being an aggressive type but when it will recur is unknown. However, should it recur GynOnc team expressed that because of her functional status (significant weakness) and malnutrition, treatment would not be recommended since that would worsen her health overall and benefits would not outweigh the risks.     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 CRP elevated, likely due to inflammation in the setting chronic wounds. Low concern for cellulitis.  - WOC consulted  - Urology recommends gauze or drain sponge around the site        Goals of care/ Social:   #Goals of care   #Recurrent hospitalizations with progressive step-wise decline  Per Chart Review, Care conference 3/20 with [facility], medicine, gyn onc. Patient's daughter and one of her cousins attended in person. The teams discussed that we are worried the patient is in the process of dying despite all of the treatment that she has been getting this admission. We recommended that we focus on keeping her comfortable for the time that she has remaining. Her daughter asked if this meant dying in the hospital and stated that she would want her mother at home. This led to a discussion about home hospice which the patient's daughter was agreeable to. Daughter stated that she \"wanted everything that could be done\" to be done clarifying with patient she stated that she would want her mother to be intubated.  Unfortunately patient experienced PEA arrest shortly after intubation and again after being sent down to the CT scanner.  Called family and discussed with them both times.  Discussed that by continuing to keep her CODE STATUS as full code that we may in fact be doing more harm to the patient than providing meaningful benefit for her at this time during this course of her care. Daughter states that at this time she would like to continue all interventions and to continue her mother's full CODE STATUS. On 3/22, this discussion was revisited after there was a new concern for bowel perforation. Daughter understands that patients prognosis is poor and would like patient to stay full code until she is able to visit patient " within the next day.  -Ethics consult in place; recommend that there are competing ethic considerations and that we as professionals should not feel compelled to violate professional judgment and perform interventions inappropriately  - Discussed goals of care with daughter; will continue with full code and partial comfort measures (fentanyl for pain) w/ plan to undergo compassionate extubation 3/25 in the afternoon.     General Cares/Prophylaxis:    DVT Prophylaxis: SCD's  GI Prophylaxis: PPI  Restraints: none  Family Communication: Daughter  Code Status: FULL code as discussed above     Lines/tubes/drains:  - Port  - Shahid  - Art line  - CVC triple lumen   - ET tube in place      Disposition:  - Medical ICU      Patient seen and findings/plan discussed with medical ICU staff, Dr. Ding.    Philippe Stockton MD  PGY-1    ====================================  INTERVAL HISTORY:   No changes overnight. Goals of cares discussion was completed yesterday evening with daughter Jeannie. Discussed patients multi-organ dysfunction and that patients prognosis is poor. Jeannie agrees to progress to full comfort cares 3/25, with partial comfort cares (pain management with fentanyl) until then. Code status was discussed and Jeannie stated she wants to discuss with brother prior to changing Aranza to DNR. Jeannie was still unable to discuss code status later in the evening. This AM, 3/25, Jeannie again agrees with transitioning Aranza to comfort cares today in the afternoon and change code status at that time. Hgb 6.2 this AM, patient given 1u pRBC.    OBJECTIVE:   1. VITAL SIGNS:   Temp:  [94.8  F (34.9  C)-99  F (37.2  C)] 97.5  F (36.4  C)  Pulse:  [43-82] 71  Resp:  [11-24] 13  BP: ()/(49-96) 108/55  FiO2 (%):  [30 %-40 %] 40 %  SpO2:  [75 %-100 %] 98 %  FiO2 (%): 40 %, Resp: 13, Vent Mode: CMV/AC, Resp Rate (Set): 12 breaths/min, Tidal Volume (Set, mL): 420 mL, PEEP (cm H2O): 5 cmH2O, Pressure Support (cm H2O): 5 cmH2O, Resp Rate  (Set): 12 breaths/min, Tidal Volume (Set, mL): 420 mL, PEEP (cm H2O): 5 cmH2O  2. INTAKE/ OUTPUT:   I/O last 3 completed shifts:  In: 3272.08 [I.V.:2252.08; NG/GT:30; IV Piggyback:880]  Out: 770 [Urine:770]    3. PHYSICAL EXAMINATION:    GEN: intubated, NAD  EYES: pupils small but round and reactive to light  HEENT:  Normocephalic, atraumatic, trachea midline, ETT secure  CV: RRR  PULM/CHEST: Clear breath sounds bilaterally, mechanically ventilated   GI: abdomen semi firm, distended, bowel sounds hypoactive  : groin/thighs excoriated  EXTREMITIES: leg wraps in place, 3+ edema   NEURO: Moves arms and legs spontaneously. Grabs fingers on command and moves eyes.  SKIN: groin excoriation, leg wounds, leg wraps in place. See media tab       4. LABS:   Arterial Blood Gases   Recent Labs   Lab 03/22/25  2008 03/22/25  1802 03/22/25  1623 03/22/25  0102   PH 7.49* 7.47* 7.49* 7.46*   PCO2 23* 24* 22* 21*   PO2 180* 178* 182* 229*   HCO3 18* 17* 17* 15*     Complete Blood Count   Recent Labs   Lab 03/25/25  0401 03/24/25 2053 03/24/25  1141 03/24/25  0327   WBC 6.4 9.1 8.0 9.4   HGB 6.2* 6.9* 8.0* 7.4*   PLT 20* 24* 29* 28*     Basic Metabolic Panel  Recent Labs   Lab 03/25/25  0527 03/25/25  0522 03/25/25  0240 03/25/25  0023 03/24/25  2056 03/24/25  1148 03/24/25  0327 03/23/25  0824 03/23/25  0318 03/22/25  1415 03/22/25  0328   NA  --  135 136  --   --   --  140  --  139  --  138   POTASSIUM  --  3.0*  --   --   --   --  3.7  --  4.1  --  4.5   CHLORIDE  --  106  --   --   --   --  112*  --  111*  --  112*   CO2  --  15*  --   --   --   --  15*  --  15*  --  13*   BUN  --  16.4  --   --   --   --  18.0  --  19.1  --  18.5   CR  --  1.04*  --   --   --   --  1.13*  --  1.14*  --  1.07*   * 146*  --  82 87   < > 95   < > 85   < > 99    < > = values in this interval not displayed.     Liver Function Tests  Recent Labs   Lab 03/25/25  0522 03/24/25  0327 03/23/25  0318 03/22/25  0328 03/21/25  1717 03/21/25  0427  03/21/25  0010 03/20/25  2150   AST 43 57* 69* 71* 57*   < > 38 30   ALT 25 31 32 34 28   < > 23 25   ALKPHOS 98 129 132 128 101   < > 102 113   BILITOTAL 1.5* 1.3* 1.4* 0.9 0.8   < > 0.4 0.4   ALBUMIN 2.9* 2.2* 2.7* 2.3* 2.2*   < > 1.5* 1.8*   INR  --   --   --   --  2.27*  --  2.39* 1.61*    < > = values in this interval not displayed.     Coagulation Profile  Recent Labs   Lab 03/21/25  1717 03/21/25  0010 03/20/25  2150   INR 2.27* 2.39* 1.61*   PTT 67* 128* 51*       5. RADIOLOGY:   No results found for this or any previous visit (from the past 24 hours).

## 2025-03-25 NOTE — PROGRESS NOTES
"SPIRITUAL HEALTH SERVICES Follow Up Note (Palliative)  [facility] ([place]) 4C     Summary: Visit with patient Alis \"Aranza\" Washington at bedside, with grandson Hemanth Gallegos.     Aranza was nodding and shaking her head to questions from Hemanth Gallegos and from me, and he was very hopeful that she might be able to recover since \"she's so much better today\". He and granddaughters plan to spend time with Aranza today, around their work schedules, and he said that her son Hemanth Whelan will be available this evening after work.    Hemanth Gallegos told stories of Aranza's love for family and emphasized again family's gratitude that she was improving. He welcomed prayer (Druze) with her, and Aranza also nodded yes to wanting prayer.     Plan: Chaplains will follow for spiritual support while Aranza is admitted.     Millie Pena M.Div., [facility]     To reach Spiritual Health, securely message with the [employer] Web Console or enter an ASAP/STAT consult in [employer], which will also page the on-call .    "

## 2025-03-25 NOTE — PROGRESS NOTES
This patient was exposed to a person with a known CP- and has the potential for having acquired this pathogen of concern. The [place] Department of Health ([facility]) and CDC recommend that we screen this patient to prevent the spread of these organisms within our healthcare facility. Infection Prevention has placed orders for collecting a rectal swab for CP- to evaluate if this patient is now a carrier.   This patient was identified to have a low risk exposure in which case Contact Precautions are not necessary unless the patient tests positive.    Screening is voluntary.  Please notify Infection Prevention if the patient declines testing.  Additional information and resources can be found on the Infection Prevention [employer] Sharepoint page.

## 2025-03-25 NOTE — PROGRESS NOTES
ICU End of Shift Summary. See flowsheets for vital signs and detailed assessment.    Changes this shift: Patient comfortable on fentanyl drip at 50mcg/hr. Intermittent boluses given for pain with nursing cares. Patient is calm and follows commands. Vent settings unchanged. Phenyl at 0.3. Suprapubic cath remains in place-pink/red urine noted. Continued copious weeping from thigh and buttock wounds, burn pads changed to keep patient dry and comfortable.     Family at bedside. Comfort cart available. Heart beat in a bottle provided to family.     Plan:  compassionate extubation to comfort cares when family is ready    No difficulties

## 2025-03-25 NOTE — PROGRESS NOTES
Pt lethargic this AM, but by afternoon, opening eyes and followed some commands between sleep. Fent gtt started for comfort (extensive leg maceration wounds needing frequent care).      Large watery stool, all dressings changed twice this shift. Temp dropped to 95 esop / 96 oral.  HR down to 40, but improving with tino hugger.     LA up to 2.9 with some oliguria. LR 250mL and 25g 25% albumin given. D10 continues at 50mL/hr for hypoglycemia, stable.     Awaiting daughter return for code status revision. Anticipating compassionate extubation tomorrow following care conference today.

## 2025-03-26 LAB
BACTERIA BLD CULT: NO GROWTH
BACTERIA BLD CULT: NO GROWTH
GLUCOSE BLDC GLUCOMTR-MCNC: 117 MG/DL (ref 70–99)
GLUCOSE BLDC GLUCOMTR-MCNC: 121 MG/DL (ref 70–99)
GLUCOSE BLDC GLUCOMTR-MCNC: 140 MG/DL (ref 70–99)
GLUCOSE BLDC GLUCOMTR-MCNC: 148 MG/DL (ref 70–99)
GLUCOSE BLDC GLUCOMTR-MCNC: 168 MG/DL (ref 70–99)
GLUCOSE BLDC GLUCOMTR-MCNC: 168 MG/DL (ref 70–99)
GLUCOSE BLDC GLUCOMTR-MCNC: 20 MG/DL (ref 70–99)
GLUCOSE BLDC GLUCOMTR-MCNC: 48 MG/DL (ref 70–99)
GLUCOSE BLDC GLUCOMTR-MCNC: 49 MG/DL (ref 70–99)
GLUCOSE BLDC GLUCOMTR-MCNC: 51 MG/DL (ref 70–99)
GLUCOSE BLDC GLUCOMTR-MCNC: 87 MG/DL (ref 70–99)
GLUCOSE BLDC GLUCOMTR-MCNC: 91 MG/DL (ref 70–99)
GLUCOSE BLDC GLUCOMTR-MCNC: 92 MG/DL (ref 70–99)
GLUCOSE BLDC GLUCOMTR-MCNC: 95 MG/DL (ref 70–99)
GLUCOSE BLDC GLUCOMTR-MCNC: 97 MG/DL (ref 70–99)
GLUCOSE BLDC GLUCOMTR-MCNC: 98 MG/DL (ref 70–99)

## 2025-03-26 PROCEDURE — 94003 VENT MGMT INPAT SUBQ DAY: CPT

## 2025-03-26 PROCEDURE — 99233 SBSQ HOSP IP/OBS HIGH 50: CPT | Performed by: PHYSICIAN ASSISTANT

## 2025-03-26 PROCEDURE — 250N000013 HC RX MED GY IP 250 OP 250 PS 637

## 2025-03-26 PROCEDURE — 99291 CRITICAL CARE FIRST HOUR: CPT | Mod: GC | Performed by: INTERNAL MEDICINE

## 2025-03-26 PROCEDURE — 258N000001 HC RX 258

## 2025-03-26 PROCEDURE — 250N000011 HC RX IP 250 OP 636: Performed by: INTERNAL MEDICINE

## 2025-03-26 PROCEDURE — 258N000003 HC RX IP 258 OP 636

## 2025-03-26 PROCEDURE — 200N000002 HC R&B ICU UMMC

## 2025-03-26 PROCEDURE — 250N000009 HC RX 250

## 2025-03-26 PROCEDURE — 258N000003 HC RX IP 258 OP 636: Performed by: INTERNAL MEDICINE

## 2025-03-26 PROCEDURE — 250N000011 HC RX IP 250 OP 636: Mod: JZ

## 2025-03-26 PROCEDURE — P9047 ALBUMIN (HUMAN), 25%, 50ML: HCPCS | Mod: JZ

## 2025-03-26 PROCEDURE — 250N000011 HC RX IP 250 OP 636: Performed by: STUDENT IN AN ORGANIZED HEALTH CARE EDUCATION/TRAINING PROGRAM

## 2025-03-26 PROCEDURE — 250N000013 HC RX MED GY IP 250 OP 250 PS 637: Performed by: INTERNAL MEDICINE

## 2025-03-26 PROCEDURE — 250N000011 HC RX IP 250 OP 636: Mod: JZ | Performed by: INTERNAL MEDICINE

## 2025-03-26 PROCEDURE — 999N000253 HC STATISTIC WEANING TRIALS

## 2025-03-26 PROCEDURE — 999N000157 HC STATISTIC RCP TIME EA 10 MIN

## 2025-03-26 RX ADMIN — CHLORHEXIDINE GLUCONATE 15 ML: 1.2 SOLUTION ORAL at 20:09

## 2025-03-26 RX ADMIN — Medication 25 MCG: at 18:05

## 2025-03-26 RX ADMIN — LINEZOLID 600 MG: 600 INJECTION, SOLUTION INTRAVENOUS at 17:14

## 2025-03-26 RX ADMIN — Medication 1 SPRAY: at 12:23

## 2025-03-26 RX ADMIN — CHLORHEXIDINE GLUCONATE 15 ML: 1.2 SOLUTION ORAL at 08:09

## 2025-03-26 RX ADMIN — DEXTROSE MONOHYDRATE 50 ML: 25 INJECTION, SOLUTION INTRAVENOUS at 05:11

## 2025-03-26 RX ADMIN — Medication 25 MCG: at 01:59

## 2025-03-26 RX ADMIN — Medication 25 MCG: at 16:39

## 2025-03-26 RX ADMIN — Medication 60 ML: at 16:33

## 2025-03-26 RX ADMIN — ALBUMIN HUMAN 25 G: 0.25 SOLUTION INTRAVENOUS at 09:21

## 2025-03-26 RX ADMIN — Medication 50 MCG/HR: at 13:57

## 2025-03-26 RX ADMIN — Medication 25 MCG: at 18:36

## 2025-03-26 RX ADMIN — PIPERACILLIN AND TAZOBACTAM 4.5 G: 4; .5 INJECTION, POWDER, LYOPHILIZED, FOR SOLUTION INTRAVENOUS at 21:20

## 2025-03-26 RX ADMIN — DEXTROSE MONOHYDRATE 25 ML: 25 INJECTION, SOLUTION INTRAVENOUS at 12:18

## 2025-03-26 RX ADMIN — LEVETIRACETAM 750 MG: 500 INJECTION, SOLUTION INTRAVENOUS at 08:10

## 2025-03-26 RX ADMIN — Medication 25 MCG: at 08:08

## 2025-03-26 RX ADMIN — ALBUMIN HUMAN 25 G: 0.25 SOLUTION INTRAVENOUS at 18:18

## 2025-03-26 RX ADMIN — Medication 1 SPRAY: at 08:09

## 2025-03-26 RX ADMIN — Medication 1 SPRAY: at 20:09

## 2025-03-26 RX ADMIN — PIPERACILLIN AND TAZOBACTAM 4.5 G: 4; .5 INJECTION, POWDER, LYOPHILIZED, FOR SOLUTION INTRAVENOUS at 04:43

## 2025-03-26 RX ADMIN — LEVETIRACETAM 750 MG: 500 INJECTION, SOLUTION INTRAVENOUS at 20:09

## 2025-03-26 RX ADMIN — Medication 25 MCG: at 12:15

## 2025-03-26 RX ADMIN — THIAMINE HYDROCHLORIDE 250 MG: 100 INJECTION, SOLUTION INTRAMUSCULAR; INTRAVENOUS at 08:08

## 2025-03-26 RX ADMIN — PIPERACILLIN AND TAZOBACTAM 4.5 G: 4; .5 INJECTION, POWDER, LYOPHILIZED, FOR SOLUTION INTRAVENOUS at 16:33

## 2025-03-26 RX ADMIN — DEXTROSE 1000 ML: 10 SOLUTION INTRAVENOUS at 20:59

## 2025-03-26 RX ADMIN — LINEZOLID 600 MG: 600 INJECTION, SOLUTION INTRAVENOUS at 05:18

## 2025-03-26 RX ADMIN — PIPERACILLIN AND TAZOBACTAM 4.5 G: 4; .5 INJECTION, POWDER, LYOPHILIZED, FOR SOLUTION INTRAVENOUS at 09:21

## 2025-03-26 RX ADMIN — Medication 25 MCG: at 14:30

## 2025-03-26 RX ADMIN — DEXTROSE AND SODIUM CHLORIDE: 10; .45 INJECTION, SOLUTION INTRAVENOUS at 05:41

## 2025-03-26 RX ADMIN — SENNOSIDES AND DOCUSATE SODIUM 1 TABLET: 50; 8.6 TABLET ORAL at 08:08

## 2025-03-26 RX ADMIN — DEXTROSE MONOHYDRATE 25 ML: 25 INJECTION, SOLUTION INTRAVENOUS at 23:39

## 2025-03-26 RX ADMIN — DEXTROSE MONOHYDRATE 25 ML: 25 INJECTION, SOLUTION INTRAVENOUS at 14:28

## 2025-03-26 RX ADMIN — ALBUMIN HUMAN 25 G: 0.25 SOLUTION INTRAVENOUS at 02:00

## 2025-03-26 RX ADMIN — PANTOPRAZOLE SODIUM 40 MG: 40 INJECTION, POWDER, FOR SOLUTION INTRAVENOUS at 08:09

## 2025-03-26 RX ADMIN — DEXTROSE MONOHYDRATE 50 ML: 25 INJECTION, SOLUTION INTRAVENOUS at 16:33

## 2025-03-26 ASSESSMENT — ACTIVITIES OF DAILY LIVING (ADL)
ADLS_ACUITY_SCORE: 96

## 2025-03-26 NOTE — PLAN OF CARE
ICU End of Shift Summary. See flowsheets for vital signs and detailed assessment.    Changes this shift: Alert. Follows commands. Attempts writing for communication. Endorses pain, 75 of fent continuous, with 25 bolus. PERRLA. SB/SR 50-80. Low temperature. PS 8 hours. CMV 30%, 12,420, 4.  Thick oral and inline secretions. NJ tf paused. No bm. Low sugars, d10 at 75 and 100mL of d50 administered. Suprapubic cath, purewick also in place. Sacral wound, bleeding. Bilateral thigh wounds, weeping. Vaseline gauze applied x4.  Fem cvc dressing changed x3.     Plan: Consider compassionate extubations, report changes to MICU 2.

## 2025-03-26 NOTE — PLAN OF CARE
ICU End of Shift Summary. See flowsheets for vital signs and detailed assessment.     Changes this shift: Patient comfortable on fentanyl drip at 50mcg/hr. Intermittent boluses given for pain with nursing cares. Patient is calm and follows commands. Vent settings unchanged, brown tinged secretions via ETT. Phenyl at 0.3. Suprapubic cath remains in place-yellow urine noted. Continued copious weeping from thigh and buttock wounds, burn pads changed to keep patient dry and comfortable.   Blood glucose 20 at 0500, D50 IV administered, recheck after 30 minutes was 140.      No family at bedside tonight. Family to decide today regarding comfort cares. Patient remains intubated and full code     Plan:  compassionate extubation to comfort cares when family is ready     Goal Outcome Evaluation:      Plan of Care Reviewed With: patient, family    Overall Patient Progress: decliningOverall Patient Progress: declining    Outcome Evaluation: Waiting for family to transition to comfort cares.

## 2025-03-26 NOTE — ETHICS CONSULT
"The purpose of this note, including any accompanying recommendation, is advisory only concerning ethical principles and considerations related to this case. Determination of appropriate patient care decisions is the responsibility of the clinical team in consultation with patients and their decision makers and relevant institutional policy. Legal and policy questions should be addressed with Risk Management.    Ethics paged to discuss surrogate decision-making dynamic with family. To this point, Aranza's daughter has operated as the main decision-maker and has fluctuated between transitioning to comfort care and pursuing restorative measures. At this point it would be ethically appropriate to involve more family members to give Joy the opportunity to share the challenge of end-of-life decision-making with those she trusts.     The care team should continue with an informed nondissent approach when it comes to medical decisions, including code status. Informed nondissent would slightly lessen the burden of decision-making on this family, however the family always has the opportunity to dissent to the proposed plan by stating no. In this case, it would also be ethically appropriate to use an informed nondissent approach to state the the team will not change the plan of care if the family agrees to transition to comfort care (still with the caveat that the family can dissent to this proposal and still change the plan of care). Informed nondissent is a helpful technique when families are facing challenging decisions that feel outside their ability, but it should never be used as contractually binding.     Ethics is available to assist as needed.  Corona Ocampo, PhD    Please contact the service if we can be of any further assistance: [facility]: \"Clinical Ethics Consultation Service\"      "

## 2025-03-26 NOTE — PROGRESS NOTES
Medical ICU PROGRESS NOTE  03/26/2025      Date of Service (when I saw the patient): 03/26/2025    ASSESSMENT: Alis Hartman is a 69 year old female with PMH of cervical cancer s/p radiation, serous endometrial adenocarcinoma s/p SNEHA/BSO (hysterectomy/ovarian removal) and cystotomy w/ suprapubic catheter placement (7/2024) as well as several cycles of carbo/taxel (10/2024), hx ESBL urosepsis and bacteremia, RNY gastric bypass, afib not on apixaban (stopped due to vaginal bleeding), HTN, CKD3 who was initially admitted with increased confusion, poor appetite and generalized weakness.  Her Hospital course complicated by MADHU, hypervolemia, acute on chronic pain, suspected dementia, and decreased oral intake with severe malnutrition on 3/20 there was plan for discharge with home hospice with continuation of medical cares to maximize patient's remaining time.  Patient began requiring higher level of oxygen support that was unable to be met by BiPAP on the evening of 3/20 and a rapid was called and after extensive discussion with family patient was intubated for airway protection. Patient had PEA arrest x2 following intubation. Now with concern for gastric ulcer perforation. Now partial comfort cares, discussing transition to full comfort cares with family.    CHANGES and MAJOR THINGS TODAY:   - Family meeting planned for tomorrow 3/27  - Fentanyl infusion for light sedation/comfort    PLAN:    Neuro:  # Pain and sedation  - RASS goal 0 to -1  - Fentanyl infusion  - buprenophine patch on hold     # C/f CVA  # C/f seizures  # New left eye deviation  # small age indeterminate (possibly recent) lacunar infarcts in bilateral thalami  # bilateral tremors of upper extremities  Patient with code stroke called due to acute mental status change and new left eye gaze deviation along with bilateral upper extremity tremors R>L.  Patient was unable to initially receive head CT due to increasing oxygen requirements. She then  had a PEA arrest. Did start some head imaging, however then coded a second time during the scan. Now responding to commands, moving eyes.  -CT head w/o: small age indeterminate (possibly recent) lacunar infarcts in bilateral thalami, no hemorrhage or mass effect  -CT head perfusion w/ contrast: not able to be completed  -CTA head and neck when stable: poor study, patient was coding during scan  -Received 1x Ativan 4 mg  -Received Keppra loading dose  -Neurology consulted with initial recs being:               - S/p keppra 3000mg IV load  - no further workup at this time pending [facility]  -Neurocrit is consulted to assess for seizures, EEG, and to recommend if we should get any further head imaging.   - Moderately diffuse nonspecific encephalopathy on EEG   - signed off at this time     # Hypothermia  Possible due to shock, post cardiac arrest. Could be related to other etiology such as sepsis.   - has bare hugger on  - cortisol and TSH normal     # Mixed etiology cognitive impairment: contributions from delirium, metabolic encephalopathy, dementia, schizophrenia   Patient initially presenting with confusion.  Has history of paranoia with suspected hallucinations.  Has also had a general decline with refusing to eat and poor nutrition for the past several days.  Patient's encephalopathy likely multifactorial given her poor oral intake, psychiatric history, and dementia component.  Feeding tube attempted on 3/14, but unable to be placed due to disorientation and difficulty with laying flat.  -IV thiamine   -Continue D10 fluids for now, nutrition plan as below  - zyprexa on hold      # Acute on Chronic Pain  Thigh, low back pain related to her coccyx.  Patient was started on buprenorphine patch.  Palliative following.  -Hold buprenorphine patch  -will monitor for signs of pain, currently would be ideal to not sedate patient to allow for an accurate neurological exam     Pulmonary:  # Acute hypoxemia respiratory failure  #  Bilateral pleural effusions  Patient with increasing acute hypoxemia on BiPAP requiring intubation. Etiology remains unclear. Could consider PE as CXR was relatively unremarkable. Bedside POCUS not strongly convincing for right heart strain. CT chest shows no PE, but large bilateral pleural effusions present. Patient now tolerating pressure support well.  - vent bundle  - Pressure support as tolerated    FiO2 (%): 30 %, Resp: 15, Vent Mode: CPAP/PS, Resp Rate (Set): 12 breaths/min, Tidal Volume (Set, mL): 420 mL, PEEP (cm H2O): 5 cmH2O, Pressure Support (cm H2O): 5 cmH2O, Resp Rate (Set): 12 breaths/min, Tidal Volume (Set, mL): 420 mL, PEEP (cm H2O): 5 cmH2O    Cardiovascular:  # Shock, likely multifactorial, functional outflow tract obstruction noted on bedside POCUS in setting of hypovolemia, possible septic component as well  Patient requiring pressor support.  Had received fluid boluses during rapid response.  Bedside POCUS on 3/21 showed functional outflow tract obstruction in setting of hypovolemia. Formal TTE on 3/21 (done after an additional 1L of LR and 500 ml albumin) showed global and regional left ventricular function is normal with an EF of 60-65%, thickening of anterobasal septal is present, left ventricular filling pressures are increased. Global right ventricular function is normal, Mild to moderate tricuspid insufficiency is present. The right ventricular systolic pressure is 31mmHg above the right atrial pressure.Has significant third spacing, however, given outflow tract obstruction, volume depletion with diuresis risks worsening hypotension. Will continue to weigh risks/benefits of diuresis.  - Continue abx as discussed below  - Phenylephrine  - MAP goal >65     # PEA arrest x2  Patient with PEA arrest that occurred after intubation and once while in the CT scanner.  Patient achieved ROSC after several rounds of CPR. Suspect arrest was related to hypoxia and functional outflow tract obstruction.    -Plan for comfort care upon family's arrival.      # Elevated troponin   Elevated troponins, likely in the setting of demand ischemia. ECG   - no wall motion abnormalities noted on TTE report     # Hx of Afib   Rate controlled. Prior admission had risk/benefit discussion and discontinued AC due to vaginal bleeding.         GI/Nutrition:  #C/f gastric ulcer perforation  3/21 CT shows discontinuity of anterior aspect of the gastric pouch, concerning for perforated marginal ulcer. Patient has had a 2 gm Hgb drop within the past day. Discussed case with GI, who does not recommend EGD due to high risk and no efficacy/clinical utility. Per General surgery, patient is not a surgical candidate given their acute illness. Discussed with family and it was agreed to transfuse blood, but not escalate cares given overall clinical picture.  - Current antibiotic coverage is sufficient     # Severe Protein-Calorie Malnutrition   # Decreased oral intake/Refusal to eat   # Hypoglycemia  # Hx Kiko en y  # Hypoalbuminemia  Patient has been refusing to eat, has not had good nutrition for several days. Calorie counts 3/11 to 3/15 showed 0 intake.   - Continue D10,  with TF off, can discontinue if/when decision to transition to comfort   - Nutrition consulted, appreciate assistance  - IV PPI      Renal/Fluids/Electrolytes:  #MADHU, oliguric, likely secondary to poor oral intake/malnutrition, renal labs currently stable  #Anasarca  #Dependent facial edema  # Hypokalemia  Creatinine at baseline 0.8-0.9. Renal US normal.  - Nephrology consulted this admission, signed off 3/16  - Lymphedema consulted, appreciate assistance  - Strict I's and O's, daily weights  - RN managed electrolyte replacements     Endocrine:  # Hypoglycemia   Previously patient has been hypoglycemic likely due to poor oral intake  -D10 fluids as above      ID:  # Pyuria, VRE in urine culture   # Hx of ESBL urosepsis and bacteremia  # Bladder dysfunction s/p cystostomy and  suprapubic catheter  Recently admitted 11/26 - 12/8/2024 for ESBL urosepsis and bacteremia requiring ICU w/ left nephrostomy tube (removed 1/7) treated w/ ertapenem, returned 1/25-1/27 for concern for UTI but no clear infection at that time. Urology collected UA during SPC exchange on 3/17 due to turbid urine and this is now growing VRE.   - Suprapubic catheter exchanged by urology on 3/17  - Continue linezolid for VRE   - Continue zosyn for asp pna      Hematology:    #Acute on chronic anemia likely secondary to chronic disease  #Thrombocytopenia, HIT ruled out  #Leukopenia (currently resolved)  # Leukocyctosis  Did require pRBC transfusion on 3/13. No signs of bleeding. Platelets have now decreased significantly from ~150 to <50. There was concern for HIT, but screening panel returned negative. Now also with leukopenia. Likely this is related to overall clinical decline and malnutrition, but will investigate other causes. On 3/22, 2 gm Hgb drop in 24 hours, concern for bowel perforation. Not a surgical candidate.  - Holding lovenox/heparin  - Smear sent     Oncology:  # Serous endometrial carcinoma  Follows w/ heme/onc through [place]. S/p SNEHA, BSO 07/2024 s/p cycle 3 carbo/taxel 10/15/2024, cycle 4 delayed in s/o recent admission, family ultimately decided to forgo any further treatment. During a care conference this admission, it was expressed that her cancer is likely to recur due to it being an aggressive type but when it will recur is unknown. However, should it recur GynOnc team expressed that because of her functional status (significant weakness) and malnutrition, treatment would not be recommended since that would worsen her health overall and benefits would not outweigh the risks.     Musculoskeletal:  # Deconditioning  Await goals of care, consider PT/OT if appropriate        Skin:  # Sacral pressure ulcer  # Lower extremity sores  # Wound around suprapubic catheter site   # Groin/Thigh  "excoriation  Wound likely from chronic moisture dermatitis and history of radiation at that site. CRP elevated, likely due to inflammation in the setting chronic wounds. Low concern for cellulitis.  - WOC consulted  - Urology recommends gauze or drain sponge around the site        Goals of care/ Social:   #Goals of care   #Recurrent hospitalizations with progressive step-wise decline  Per Chart Review, Care conference 3/20 with [facility] care, medicine, gyn onc. Patient's daughter and one of her cousins attended in person. The teams discussed that we are worried the patient is in the process of dying despite all of the treatment that she has been getting this admission. We recommended that we focus on keeping her comfortable for the time that she has remaining. Her daughter asked if this meant dying in the hospital and stated that she would want her mother at home. This led to a discussion about home hospice which the patient's daughter was agreeable to. Daughter stated that she \"wanted everything that could be done\" to be done clarifying with patient she stated that she would want her mother to be intubated.  Unfortunately patient experienced PEA arrest shortly after intubation and again after being sent down to the CT scanner.  Called family and discussed with them both times.  Discussed that by continuing to keep her CODE STATUS as full code that we may in fact be doing more harm to the patient than providing meaningful benefit for her at this time during this course of her care. Daughter states that at this time she would like to continue all interventions and to continue her mother's full CODE STATUS. On 3/22, this discussion was revisited after there was a new concern for bowel perforation. Transition to comfort cares was discussed and planned with Jeannie for 3/25, but she decided against this just prior to initiating cares. We now plan to have a care conference 3/27 with multiple family members to discuss " transition to comfort cares.  -Ethics consult in place; recommend that there are competing ethic considerations and that we as professionals should not feel compelled to violate professional judgment and perform interventions inappropriately  - Care conference with family members planned for 3/26; until then, we will continue patient on pressure support as tolerated, with mild sedation, and discontinue all lab draws.     General Cares/Prophylaxis:    DVT Prophylaxis: SCD's  GI Prophylaxis: PPI  Restraints: none  Family Communication: Daughter  Code Status: FULL code as discussed above     Lines/tubes/drains:  - Port  - Kent  - Art line  - CVC triple lumen   - ET tube in place      Disposition:  - Medical ICU      Patient seen and findings/plan discussed with medical ICU staff, Dr. Ding.    Philippe Stockton MD  PGY-1    ====================================  INTERVAL HISTORY:     Yesterday, initiation of comfort cares and compassionate extubation was planned in the afternoon per discussion with daughter Jeannie. Prior to initiation of cares, Jeannie expressed that this is no longer what she wants patient to be placed on comfort cares and remain full cares. This morning, patient remains full code with partial comfort measures in place. At the bedside, patient is tolerating pressure support well. Arousal to voice and interactive, following simple commands. Afebrile and otherwise hemodynamically stable. There was hematuria in kent yesterday afternoon; slight red tinge to urine this morning, but less red. Remains on 0.3 phenylephrine and light sedation with fentanyl.    OBJECTIVE:   1. VITAL SIGNS:   Temp:  [94.8  F (34.9  C)-97.3  F (36.3  C)] 94.8  F (34.9  C)  Pulse:  [49-80] 65  Resp:  [11-23] 15  BP: ()/() 116/63  FiO2 (%):  [30 %-40 %] 30 %  SpO2:  [83 %-100 %] 98 %  FiO2 (%): 30 %, Resp: 15, Vent Mode: CPAP/PS, Resp Rate (Set): 12 breaths/min, Tidal Volume (Set, mL): 420 mL, PEEP (cm H2O): 5 cmH2O, Pressure  Support (cm H2O): 5 cmH2O, Resp Rate (Set): 12 breaths/min, Tidal Volume (Set, mL): 420 mL, PEEP (cm H2O): 5 cmH2O  2. INTAKE/ OUTPUT:   I/O last 3 completed shifts:  In: 2964.05 [I.V.:1784.05; Other:400; IV Piggyback:380]  Out: 1050 [Urine:1050]    3. PHYSICAL EXAMINATION:    GEN: intubated, NAD  EYES: pupils small but round and reactive to light  HEENT:  Normocephalic, atraumatic, trachea midline, ETT secure  CV: RRR  PULM/CHEST: Clear breath sounds bilaterally, mechanically ventilated   GI: abdomen semi firm, distended, bowel sounds hypoactive  : groin/thighs excoriated  EXTREMITIES: leg wraps in place, 3+ edema   NEURO: Moves arms and legs spontaneously. Grabs fingers on command and moves eyes.  SKIN: groin excoriation, leg wounds, leg wraps in place. See media tab       4. LABS:   Arterial Blood Gases   Recent Labs   Lab 03/22/25  2008 03/22/25  1802 03/22/25  1623 03/22/25  0102   PH 7.49* 7.47* 7.49* 7.46*   PCO2 23* 24* 22* 21*   PO2 180* 178* 182* 229*   HCO3 18* 17* 17* 15*     Complete Blood Count   Recent Labs   Lab 03/25/25  0401 03/24/25  2053 03/24/25  1141 03/24/25  0327   WBC 6.4 9.1 8.0 9.4   HGB 6.2* 6.9* 8.0* 7.4*   PLT 20* 24* 29* 28*     Basic Metabolic Panel  Recent Labs   Lab 03/26/25  1236 03/26/25  1211 03/26/25  0938 03/26/25  0800 03/25/25  0527 03/25/25  0522 03/25/25  0240 03/24/25  1148 03/24/25  0327 03/23/25  0824 03/23/25  0318 03/22/25  1415 03/22/25  0328   NA  --   --   --   --   --  135 136  --  140  --  139  --  138   POTASSIUM  --   --   --   --   --  3.0*  --   --  3.7  --  4.1  --  4.5   CHLORIDE  --   --   --   --   --  106  --   --  112*  --  111*  --  112*   CO2  --   --   --   --   --  15*  --   --  15*  --  15*  --  13*   BUN  --   --   --   --   --  16.4  --   --  18.0  --  19.1  --  18.5   CR  --   --   --   --   --  1.04*  --   --  1.13*  --  1.14*  --  1.07*   * 49* 87 91   < > 146*  --    < > 95   < > 85   < > 99    < > = values in this interval not  displayed.     Liver Function Tests  Recent Labs   Lab 03/25/25  0522 03/24/25  0327 03/23/25  0318 03/22/25  0328 03/21/25  1717 03/21/25  0427 03/21/25  0010 03/20/25  2150   AST 43 57* 69* 71* 57*   < > 38 30   ALT 25 31 32 34 28   < > 23 25   ALKPHOS 98 129 132 128 101   < > 102 113   BILITOTAL 1.5* 1.3* 1.4* 0.9 0.8   < > 0.4 0.4   ALBUMIN 2.9* 2.2* 2.7* 2.3* 2.2*   < > 1.5* 1.8*   INR  --   --   --   --  2.27*  --  2.39* 1.61*    < > = values in this interval not displayed.     Coagulation Profile  Recent Labs   Lab 03/21/25 1717 03/21/25  0010 03/20/25  2150   INR 2.27* 2.39* 1.61*   PTT 67* 128* 51*       5. RADIOLOGY:   No results found for this or any previous visit (from the past 24 hours).

## 2025-03-26 NOTE — PROGRESS NOTES
Family Care Conference:      Time:  Thursday March 27th 2:00pm     Location:  Conference Room      Invitees:   MICU- MICU 1 Resident Néstor Stockton  Palliative: Kamilah De La Trore, THAO (confirmed)  : Millie Pena (confirmed)  Family: Joy, daughter (confirmed- she will reach out to the rest of the family). RNCC requested at minimum pt's son Freedom Whelan attend (P:[phone]).         Call into conference information:   [phone]   ID: [id number]   PIN:[phone]     Jaylin Villar RN, [facility]  Inpatient Care Management - FLOAT

## 2025-03-26 NOTE — PROGRESS NOTES
"  PALLIATIVE CARE PROGRESS NOTE  [facility]     Patient Name: Alis Hartman  Date of Admission: 3/4/2025   Today the patient was seen for: goals of care     Recommendations & Counseling     GOALS OF CARE:   Life-prolonging with plan to transition to comfort measures later today 3/25, tentative plan for compassionate extubation at 4pm  Participated in call to daughter Joy with MICU around 11:30am. Joy confirmed that multiple family members are coming in today and asks that we \"don't take anything out without us there.\" Confirmed plan to wait until family is at bedside before extubation, explained importance of setting a time to start transition to comfort as we worry Aranza is suffering. Joy reports family should be arriving around 3pm and reasonable to plan for extubation around 4pm.    ADVANCE CARE PLANNING:  Previously completed HCD naming Joy (daughter) as HCA, appears this was not dated and deemed invalid. Joy has been serving as surrogate decision maker with input from her brother Hemanth (patient's son).  Code status: Full Code    PSYCHOSOCIAL/SPIRITUAL:  Family: Daughter (Joy) and grandchildren, son (Freedom), niece (Armida), siblings  Olivia: Jew, appreciate  support    Palliative Care will continue to follow.     Kamilah De La Torre PA-C  [facility], Palliative Care  Securely message with the Vocera Web Console (learn more here) or  Text page via [facility] Paging/Directory        Assessment          Alis Hartman is a 69 year old female with a past medical history of cervical cancer s/p radiation, seriuos adenocarcinoma s/p SNEHA/BSO and cystotomy w/ suprapubic catheter placement 07/2024 and 3 cycles of carbo/taxol (10/2024), hx ESBL urosepsis and bacteremia, RNY gastric bypass, afib on apixaban, HTN, CKD stage 3, and multiple recent admissions (most recently 2/5-2/25 with MADHU, deconditioning, and malnutrition), re-admitted 3/4 with " concern for UTI and failure to thrive/general weakness. Palliative following for symptom management and goals of care.       Interval History:     Multidisciplinary collaboration:  Discussed with MICU team during rounds  Received 1u PRBC for hemoglobin drop     Patient/family narrative  Saw briefly this morning, Aranza is laying in bed, eyes open and interactive, remains on vent    Physical Exam:   Temp:  [96.3  F (35.7  C)-97.4  F (36.3  C)] 97  F (36.1  C)  Pulse:  [49-80] 64  Resp:  [11-23] 22  BP: ()/() 132/69  FiO2 (%):  [30 %-40 %] 30 %  SpO2:  [83 %-100 %] 99 %  136 lbs 14.49 oz    Physical Exam  Gen: laying in bed, eyes open and interactive          Data Reviewed:        CMP             Recent Labs   Lab 03/25/25  0754 03/25/25  0527 03/25/25  0522 03/25/25  0240 03/24/25  1148 03/24/25  0327 03/22/25  1415 03/22/25  0328   NA  --   --  135 136  --  140   < > 138   POTASSIUM  --   --  3.0*  --   --  3.7   < > 4.5   CHLORIDE  --   --  106  --   --  112*   < > 112*   CO2  --   --  15*  --   --  15*   < > 13*   ANIONGAP  --   --  14  --   --  13   < > 13   GLC 82 138* 146*  --    < > 95   < > 99   BUN  --   --  16.4  --   --  18.0   < > 18.5   CR  --   --  1.04*  --   --  1.13*   < > 1.07*   GFRESTIMATED  --   --  57*  --   --  52*   < > 55*   SAMUEL  --   --  7.6*  --   --  7.8*   < > 7.1*   MAG  --   --  1.9  --   --   --   --  2.2   PHOS  --   --  2.6  --   --   --   --  3.2   PROTTOTAL  --   --  4.0*  --   --  3.6*   < > 3.7*   ALBUMIN  --   --  2.9*  --   --  2.2*   < > 2.3*   BILITOTAL  --   --  1.5*  --   --  1.3*   < > 0.9   ALKPHOS  --   --  98  --   --  129   < > 128   AST  --   --  43  --   --  57*   < > 71*   ALT  --   --  25  --   --  31   < > 34    < > = values in this interval not displayed.      CBC       Recent Labs   Lab 03/25/25  0401 03/24/25  2053   WBC 6.4 9.1   RBC 1.98* 2.25*   HGB 6.2* 6.9*   HCT 18.9* 21.5*   MCV 96 96   MCH 31.3 30.7   MCHC 32.8 32.1   RDW 21.2* 21.7*   PLT  20* 24*       Medical Decision Making       25 MINUTES SPENT BY ME on the date of service doing chart review, history, exam, documentation & further activities per the note.

## 2025-03-26 NOTE — PROGRESS NOTES
"SPIRITUAL HEALTH SERVICES Follow Up Note (Palliative)  [facility] 4C     Summary: Co-visit with Palliative Care THAO Wil with patient Alis \"Aranza\" Washington, as well as telephone call to sister María Elena and conversations with interdisciplinary team and Ethics in coordination of care.    Family are concerned about Aranza and want to be supportive of her; they are stressed by her condition. RNCC setting up care conference for 2:00pm on 3/27/25.    Aranza herself communicated by nodded and shaking her head. She nodded yes to wanting prayer (Zoroastrianism) and affirmed also that belief in God is important to her, and she trusts in God to be present with her.    Aranza and family continue to benefit from spiritual support and including beliefs and values in goals of care discussions.     Plan: Chaplains will follow for spiritual support while Aranza is admitted.     Millie Pena M.Div., [facility]     To reach Spiritual Health, securely message with the [employer] Web Console or enter an ASAP/STAT consult in [employer], which will also page the on-call .    "

## 2025-03-26 NOTE — PROGRESS NOTES
Brief MICU progress note    Given patient's clinical decline today and grave prognosis we reached out to the patient's primary decision-maker, Joy (daughter), to inform her that we will be changing her CODE STATUS to DNR based on our obligations to not cause harm without proportionate benefit.  She verbalized understanding and did not dissent. Joy plans to come in today and is arranging for more family to come in as well.    Patient discussed with Dr. Ding.    Rudy Pritchett MD  Pulmonary and Critical Care Fellow

## 2025-03-26 NOTE — PROGRESS NOTES
"CLINICAL NUTRITION SERVICES    Informed decision made to change pt s status to comfort care in the coming days. Nutrition interventions discontinued per provider and no further interventions planned at this time. RD can be consulted if needed.    RD signing off on 3/26/2025.    Lesley Juan, MS, RDN, LD  4C MICU RD  Vocera - \"4C Clinical Dietitian\"  Weekend/Holiday RD - \"Weekend Clinical Dietitian\"    "

## 2025-03-26 NOTE — PROVIDER NOTIFICATION
Shift Summary:     Pt remained intubated and mechanically ventilated on the following vent settings:   FiO2 (%): 30 %, Resp: 21, Vent Mode: CMV/AC, Resp Rate (Set): 12 breaths/min, Tidal Volume (Set, mL): 420 mL, PEEP (cm H2O): 5 cmH2O, Pressure Support (cm H2O): 5 cmH2O, Resp Rate (Set): 12 breaths/min, Tidal Volume (Set, mL): 420 mL, PEEP (cm H2O): 5 cmH2O    Vent Mode:  cmv   SBT:5/530% for 8 hrs  tolerated well   Criteria met for SBT (Y/N): Y         Assessment: BS clear bilaterally.           Ambu bag, mask, and valve present at bedside.      RT will continue to follow and monitor pt as appropriate.     Delmar Steve RT on 3/26/2025 at 5:49 PM

## 2025-03-27 VITALS
OXYGEN SATURATION: 95 % | HEIGHT: 63 IN | TEMPERATURE: 96.4 F | BODY MASS INDEX: 24.26 KG/M2 | WEIGHT: 136.91 LBS | DIASTOLIC BLOOD PRESSURE: 57 MMHG | HEART RATE: 85 BPM | SYSTOLIC BLOOD PRESSURE: 98 MMHG | RESPIRATION RATE: 17 BRPM

## 2025-03-27 LAB
GLUCOSE BLDC GLUCOMTR-MCNC: 100 MG/DL (ref 70–99)
GLUCOSE BLDC GLUCOMTR-MCNC: 102 MG/DL (ref 70–99)
GLUCOSE BLDC GLUCOMTR-MCNC: 112 MG/DL (ref 70–99)
GLUCOSE BLDC GLUCOMTR-MCNC: 248 MG/DL (ref 70–99)
GLUCOSE BLDC GLUCOMTR-MCNC: 52 MG/DL (ref 70–99)
GLUCOSE BLDC GLUCOMTR-MCNC: 53 MG/DL (ref 70–99)
GLUCOSE BLDC GLUCOMTR-MCNC: 63 MG/DL (ref 70–99)
GLUCOSE BLDC GLUCOMTR-MCNC: 69 MG/DL (ref 70–99)
GLUCOSE BLDC GLUCOMTR-MCNC: 73 MG/DL (ref 70–99)
GLUCOSE BLDC GLUCOMTR-MCNC: 83 MG/DL (ref 70–99)
GLUCOSE BLDC GLUCOMTR-MCNC: 83 MG/DL (ref 70–99)
GLUCOSE BLDC GLUCOMTR-MCNC: 85 MG/DL (ref 70–99)
GLUCOSE BLDC GLUCOMTR-MCNC: 93 MG/DL (ref 70–99)
GLUCOSE BLDC GLUCOMTR-MCNC: 96 MG/DL (ref 70–99)
HBV SURFACE AG SERPL QL IA: NONREACTIVE
HIV 1+2 AB+HIV1 P24 AG SERPL QL IA: NONREACTIVE

## 2025-03-27 PROCEDURE — 250N000013 HC RX MED GY IP 250 OP 250 PS 637

## 2025-03-27 PROCEDURE — 999N000202 HC STATISTICAL VASC ACCESS NURSE TIME, 1-15 MINUTES

## 2025-03-27 PROCEDURE — 999N000248 HC STATISTIC IV INSERT WITH US BY RN

## 2025-03-27 PROCEDURE — 999N000128 HC STATISTIC PERIPHERAL IV START W/O US GUIDANCE

## 2025-03-27 PROCEDURE — 258N000001 HC RX 258

## 2025-03-27 PROCEDURE — 99291 CRITICAL CARE FIRST HOUR: CPT | Mod: GC | Performed by: INTERNAL MEDICINE

## 2025-03-27 PROCEDURE — 999N000104 HEPATITIS B SURFACE ANTIGEN: Performed by: INTERNAL MEDICINE

## 2025-03-27 PROCEDURE — 250N000009 HC RX 250

## 2025-03-27 PROCEDURE — 250N000011 HC RX IP 250 OP 636

## 2025-03-27 PROCEDURE — 250N000011 HC RX IP 250 OP 636: Mod: JZ | Performed by: INTERNAL MEDICINE

## 2025-03-27 PROCEDURE — 258N000003 HC RX IP 258 OP 636: Performed by: INTERNAL MEDICINE

## 2025-03-27 PROCEDURE — P9047 ALBUMIN (HUMAN), 25%, 50ML: HCPCS | Mod: JZ

## 2025-03-27 PROCEDURE — 250N000011 HC RX IP 250 OP 636: Performed by: STUDENT IN AN ORGANIZED HEALTH CARE EDUCATION/TRAINING PROGRAM

## 2025-03-27 PROCEDURE — 999N000197 HC STATISTIC WOC PT EDUCATION, 0-15 MIN

## 2025-03-27 PROCEDURE — 200N000002 HC R&B ICU UMMC

## 2025-03-27 PROCEDURE — 250N000011 HC RX IP 250 OP 636: Performed by: INTERNAL MEDICINE

## 2025-03-27 RX ORDER — CARBOXYMETHYLCELLULOSE SODIUM 5 MG/ML
1 SOLUTION/ DROPS OPHTHALMIC EVERY 8 HOURS PRN
Status: DISCONTINUED | OUTPATIENT
Start: 2025-03-27 | End: 2025-04-04 | Stop reason: HOSPADM

## 2025-03-27 RX ORDER — NOREPINEPHRINE BITARTRATE 0.02 MG/ML
INJECTION, SOLUTION INTRAVENOUS
Status: DISCONTINUED
Start: 2025-03-27 | End: 2025-03-27

## 2025-03-27 RX ORDER — ACETAMINOPHEN 325 MG/1
975 TABLET ORAL EVERY 6 HOURS PRN
Status: DISCONTINUED | OUTPATIENT
Start: 2025-03-27 | End: 2025-04-04 | Stop reason: HOSPADM

## 2025-03-27 RX ORDER — GLYCOPYRROLATE 0.2 MG/ML
0.1 INJECTION, SOLUTION INTRAMUSCULAR; INTRAVENOUS EVERY 4 HOURS PRN
Status: DISCONTINUED | OUTPATIENT
Start: 2025-03-27 | End: 2025-04-04 | Stop reason: HOSPADM

## 2025-03-27 RX ORDER — GLYCOPYRROLATE 0.2 MG/ML
0.2 INJECTION, SOLUTION INTRAMUSCULAR; INTRAVENOUS ONCE
Status: COMPLETED | OUTPATIENT
Start: 2025-03-27 | End: 2025-03-27

## 2025-03-27 RX ADMIN — Medication 25 MCG: at 18:13

## 2025-03-27 RX ADMIN — Medication 25 MCG: at 07:21

## 2025-03-27 RX ADMIN — Medication 1 SPRAY: at 18:29

## 2025-03-27 RX ADMIN — THIAMINE HYDROCHLORIDE 250 MG: 100 INJECTION, SOLUTION INTRAMUSCULAR; INTRAVENOUS at 09:34

## 2025-03-27 RX ADMIN — DEXTROSE MONOHYDRATE 50 ML: 25 INJECTION, SOLUTION INTRAVENOUS at 07:30

## 2025-03-27 RX ADMIN — ALBUMIN HUMAN 25 G: 0.25 SOLUTION INTRAVENOUS at 11:12

## 2025-03-27 RX ADMIN — Medication 25 MCG: at 13:36

## 2025-03-27 RX ADMIN — Medication 25 MCG: at 14:45

## 2025-03-27 RX ADMIN — Medication 3.5 MCG/KG/MIN: at 06:18

## 2025-03-27 RX ADMIN — Medication 1 MCG/KG/MIN: at 02:01

## 2025-03-27 RX ADMIN — Medication 25 MCG: at 12:30

## 2025-03-27 RX ADMIN — DEXTROSE MONOHYDRATE 50 ML: 25 INJECTION, SOLUTION INTRAVENOUS at 06:31

## 2025-03-27 RX ADMIN — LINEZOLID 600 MG: 600 INJECTION, SOLUTION INTRAVENOUS at 04:30

## 2025-03-27 RX ADMIN — PANTOPRAZOLE SODIUM 40 MG: 40 INJECTION, POWDER, FOR SOLUTION INTRAVENOUS at 09:34

## 2025-03-27 RX ADMIN — LEVETIRACETAM 750 MG: 500 INJECTION, SOLUTION INTRAVENOUS at 10:39

## 2025-03-27 RX ADMIN — PIPERACILLIN AND TAZOBACTAM 4.5 G: 4; .5 INJECTION, POWDER, LYOPHILIZED, FOR SOLUTION INTRAVENOUS at 12:00

## 2025-03-27 RX ADMIN — Medication 50 MCG: at 21:01

## 2025-03-27 RX ADMIN — PIPERACILLIN AND TAZOBACTAM 4.5 G: 4; .5 INJECTION, POWDER, LYOPHILIZED, FOR SOLUTION INTRAVENOUS at 03:51

## 2025-03-27 RX ADMIN — DEXTROSE MONOHYDRATE 50 ML: 25 INJECTION, SOLUTION INTRAVENOUS at 09:30

## 2025-03-27 RX ADMIN — ALBUMIN HUMAN 25 G: 0.25 SOLUTION INTRAVENOUS at 18:52

## 2025-03-27 RX ADMIN — Medication 25 MCG: at 17:30

## 2025-03-27 RX ADMIN — DEXTROSE MONOHYDRATE 25 ML: 25 INJECTION, SOLUTION INTRAVENOUS at 13:54

## 2025-03-27 RX ADMIN — Medication 25 MCG: at 09:30

## 2025-03-27 RX ADMIN — Medication 50 MCG: at 22:24

## 2025-03-27 RX ADMIN — Medication 25 MCG: at 15:44

## 2025-03-27 RX ADMIN — CHLORHEXIDINE GLUCONATE 15 ML: 1.2 SOLUTION ORAL at 09:34

## 2025-03-27 RX ADMIN — ACETAMINOPHEN 975 MG: 325 TABLET, FILM COATED ORAL at 03:53

## 2025-03-27 RX ADMIN — Medication 50 MCG/HR: at 07:21

## 2025-03-27 RX ADMIN — ALBUMIN HUMAN 25 G: 0.25 SOLUTION INTRAVENOUS at 01:57

## 2025-03-27 RX ADMIN — DEXTROSE MONOHYDRATE 25 ML: 25 INJECTION, SOLUTION INTRAVENOUS at 02:11

## 2025-03-27 RX ADMIN — Medication 25 MCG: at 11:30

## 2025-03-27 RX ADMIN — PIPERACILLIN AND TAZOBACTAM 4.5 G: 4; .5 INJECTION, POWDER, LYOPHILIZED, FOR SOLUTION INTRAVENOUS at 18:00

## 2025-03-27 RX ADMIN — DEXTROSE 1000 ML: 10 SOLUTION INTRAVENOUS at 06:29

## 2025-03-27 RX ADMIN — Medication 75 MCG/HR: at 07:43

## 2025-03-27 RX ADMIN — Medication 1 SPRAY: at 09:40

## 2025-03-27 RX ADMIN — DEXTROSE 1000 ML: 10 SOLUTION INTRAVENOUS at 17:47

## 2025-03-27 RX ADMIN — Medication 1 SPRAY: at 13:36

## 2025-03-27 ASSESSMENT — ACTIVITIES OF DAILY LIVING (ADL)
ADLS_ACUITY_SCORE: 96

## 2025-03-27 NOTE — PROGRESS NOTES
"SPIRITUAL HEALTH SERVICES Follow Up Note (Palliative)  [facility] ([place]) 4C     Summary: Spoke with MICU team and RN regarding support needs for patient Alis \"Aranza\" Washington's family, as well as visiting with Aranza at bedside. Aranza communicated by nodding; she welcomes prayer and reassurance of God's presence with her (Christianity). No family present at time of visit; I left a note for them on Aranza's whiteboard.     Plan: Chaplains will follow for spiritual support while Aranza is admitted.     Millie Pena M.Div., [facility]     To reach Spiritual Health, securely message with the Vocera Web Console or enter an ASAP/STAT consult in [employer], which will also page the on-call .    "

## 2025-03-27 NOTE — PROGRESS NOTES
Care Management Follow Up    Length of Stay (days): 23    Expected Discharge Date: 03/31/2025     Concerns to be Addressed:     Care Conference Calncellation   Patient plan of care discussed at interdisciplinary rounds: Yes    Anticipated Discharge Disposition:      RNCC was notified by Chaplain Millie, that Care Conference was cancelled due to plans to transition to comfort care.     Notified all involved providers.     Remedios Sierra MSN, MA, CCM, RN  4C/4A/4E ICU Care Coordinator  Phone:[phone]  Available via [employer]der     For Weekend & Holiday on call RN Care Coordinator:  [facility] ([phone]) Saturday & Sunday; ([phone])  Recognized Holidays   Weekend 4C/4A/4E ICUs /6A Care Coordinator  Available via Rudder

## 2025-03-27 NOTE — PROGRESS NOTES
Met with Joy at the bedside and explained patient's decline and poor prognosis as well as discomfort with the breathing tube. Patient clearly stating she would like the breathing tube out throughout the day. Joy agreed her mother was in pain and discomfort and agreed that the breathing tube needed to come out. Her brother and Aranza's son Hemanth was on the phone and heard that the plan was to remove the breathing tube. Joy requested that the team wait until Aranza's sister arrived, who was reportedly on the way. The team informed her that the plan was to remove the breathing tube at the very least once Aranza's sister arrived, Joy agreed that the tube needed to be removed. Team explained that the breathing tube was causing a lot of discomfort and should be removed, in addition her respiratory status appeared fairly stable today. Explained that the team would recommend comfort cares being initiated tonight to limit Aranza's pain and discomfort and allow the patient to pass peacefully. Family plans to arrive later and final decision about full comfort care will be made then.    Jonathan Shen MD, PhD  Internal Medicine and Pediatrics, PGY-3

## 2025-03-27 NOTE — PROGRESS NOTES
Brief ICU progress note    Central line has failed and thus phenylephrine must be administered via peripheral IV. However, it is unsafe to administer peripheral phenylephrine at her current dose. Thus based on our obligations to not cause harm without proportionate benefit, we informed Joy (daughter) that we will limit phenylephrine to 2 mcg/kg/min as this is the upper limit of what is safe in a peripheral IV. She verbalized understanding and did not dissent. She also provided consent for body fluid exposures from nursing aid who came in contact the patient's body fluid in her eye.    Rudy Pritchett MD  Pulmonary and Critical Care Fellow

## 2025-03-27 NOTE — PROGRESS NOTES
"[facility]  WO Nurse Inpatient Assessment     Consulted for: coccyx  3/7: New consult for wounds to bilateral legs  3/17: New consult for wound around SP cath  3/20: New consult for PI on buttocks  3/21: repeat consult for legs/labia after transfer to ICU, continue plan from yesterday below, [facility] will follow up next week   3/27: Per bedside RN, Pt condition declining rapidly. Unable to assess Pt at this time. Per RN wounds stable to deteriorating. Perineal area with very fragile skin.     Summary: known by [facility] from previous admission     WO nurse follow-up plan: weekly    Patient History (according to provider note(s):      The patient is a 69 year old female, with an extensive PMHx which is nicely documented in Dr. Khna and Mis's note from the ER.  Per their notes:  Ms. 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  The patient reportedly was feeling sick and her \"caretakers\" wanted her seen by a physician.  Apparently she has not been feeling well since her last discharge from the hospital with increased confusion, poor appetite and increased generalized weakness which now she is clearly confined to her bed because of this. Her urine output(UOP) has been foul smelling and lower amounts.  The patient refused to come to the ER 2 days ago.  The patient denies any other complaints but again has a difficult time offering a cogent history as to how she is feeling and what she's complaining of.  The remainder of the history of present illness is limited given the patient's altered mental status    Assessment:      Areas visualized during today's visit: Focused: and Abdomen    Wound location: " Lower abdomen around SP catheter      Last photo: 3/20  Wound due to: Friction and Moisture Associated Skin Damage (MASD)  Wound history/plan of care: Skin break down noted by bedside RN. Pt has chronic indwelling SP cath. Tube was exchanged at bedside by Urology 3/17  Wound base: 100 % Erythema and superficial erosion     Palpation of the wound bed: normal      Drainage: none     Description of drainage: none     Measurements (length x width x depth, in cm): See photos     Tunneling: N/A     Undermining: N/A  Periwound skin: Superficial erosion      Color: pink      Temperature: normal   Odor: none  Pain: moderate, tender  Pain interventions prior to dressing change: slow and gentle cares   Treatment goal: Heal  and Protection  STATUS: deteriorating  Supplies ordered: at bedside     Wound location: Bilateral legs     Left medial lower leg        Left posterior thigh      Right Calf        Right hip        Last photo: 3/20  Wound due to:  Edema blisters  Wound history/plan of care: Pt had large edema blisters on a previous admission that unroofed. Large wound on left medial leg 90% epithelialized.  3/20: Pt has a large amount of weeping edema from all open wounds.  Wound base: 50 % Dermis, 50 % new pink Epidermis -     Palpation of the wound bed: normal      Drainage: small     Description of drainage: serous     Measurements (length x width x depth, in cm): See photos     Tunneling: N/A     Undermining: N/A  Periwound skin: Intact, Ecchymosis, and Edematous - there is purple mottling scattered around bilateral legs      Color: normal and consistent with surrounding tissue      Temperature: normal   Odor: none  Pain: moderate and during dressing change, aching  Pain interventions prior to dressing change: slow and gentle cares   Treatment goal: Heal  and Protection  STATUS: healing  Supplies ordered: at bedside     Pressure Injury Location: Sacrum/coccyx                            2/18          Last photo:  3/20  Wound type: Pressure Injury, Shear, and Moisture Associated Skin Damage (MASD)     Pressure Injury Stage: site of previously healed pressure injury, present on admission     Wound history/plan of care:   unknown worst stage, currently appears to be a stage 2 but was possibly deeper. Was noted as a reinjury on her last assessment here 12/4/24 and 1/27/25  Wound base: 80 % Fibrin, 20% dermis.      Palpation of the wound bed: normal      Drainage: small     Description of drainage: none     Measurements (length x width x depth, in cm) total area 7 x 4 x 0.1 cm   Periwound skin: Intact and Erythema- blanchable      Color: normal and consistent with surrounding tissue      Temperature: normal   Odor: none  Pain: moderate, tender  Pain intervention prior to dressing change: slow and gentle cares   Treatment goal: Heal  and Protection  STATUS: stable  Supplies ordered: discussed with RN and discussed with patient     Wound location: Yaa-anal area, inner thighs        Last photo: 3/20  Wound due to: Friction, Incontinence Associated Dermatitis (IAD), and Moisture Associated Skin Damage (MASD)  Wound history/plan of care: Pt has been non ambulatory. Per RN Pt had frequent loose stools overnight and has been incontinent of bowel this admission. Pt has a SP cath for urine. Wounds appear consistent with severe MASD. Pt has present on admission pressure injuries to sacrum/coccyx area. Pt also has weeping edema that is leaking out of exposed areas of dermis on inner thighs, exacerbating the moisture issue.   Wound base: 80 % Dermis, 20 % Fibrin     Palpation of the wound bed: normal      Drainage: copious     Description of drainage: serous     Measurements (length x width x depth, in cm): See photos     Tunneling: N/A     Undermining: N/A  Periwound skin: Edematous, Macerated, and Superficial erosion      Color: dusky, pink, and red      Temperature: normal   Odor: none  Pain: no grimacing or signs of discomfort,  none  Pain interventions prior to dressing change: slow and gentle cares   Treatment goal: Decrease moisture and Protection  STATUS: initial assessment  Supplies ordered: at bedside and discussed with RN       Treatment Plan:     Inner thigh wounds: PRN, gently cleanse with saline and pat dry. Place one 9x11 Mextra super absorbent pad (#393382) against each right and left inner thigh. Change pad PRN for drainage.    Lower abdomen around SP cath: BID and as needed.   Cleanse the area with Marry cleanse and protect, very gently with soft cloth.  Apply ostomy powder (#031799) on all open and denuded skin.  Apply thin layer of Barrier paste (ex: Critic aid) on top of it.  With repeat application, do not scrub the paste, only remove soiled paste and reapply.  If complete removal of paste is necessary use baby oil/mineral oil (#475977) and soft wash cloth.    Sacrum/Buttocks/Yaa-anal area: BID and as needed.   Cleanse the area with Marry cleanse and protect, very gently with soft cloth.  Apply ostomy powder (#391443) on all open and denuded skin.  Apply thin layer of Barrier paste (ex: Critic aid) on top of it.  With repeat application, do not scrub the paste, only remove soiled paste and reapply.  If complete removal of paste is necessary use baby oil/mineral oil (#223725) and soft wash cloth.  Ensure pt has chair cushion (#224809) while sitting up in the chair.  Use only one blue pad in between mattress and pt. No brief in bed.      Bilateral lower legs wounds: Every other day: remove dressings and wash wounds with Vashe and gauze. Pat dry. Cover open areas with Vaseline gauze and secure with large Mepilex or ABD and stretch netting. If using Mepilex, note patient has very delicate skin so care should be taken to prevent worsening skin tears when removing.     RLE shin wound: PRN Place a 5x5 Mextra super absorbent pad (070133) on small open area on right shin. Change PRN for drainage.    Pressure Injury Prevention (PIP)  "Plan:  If patient is declining pressure injury prevention interventions: Explore reason why and address patient's concerns, Educate on pressure injury risk and prevention intervention(s), If patient is still declining, document \"informed refusal\" , and Ensure Care team is aware ( provider, charge nurse, etc)  Mattress: Follow bed algorithm, add Low Air Loss (Air+) mattress pump if skin is very moist or constantly moist.   HOB: Maintain at or below 30 degrees, unless contraindicated  Repositioning in bed: Every 1-2 hours , Left/right positioning; avoid supine, Raise foot of bed prior to raising head of bed, to reduce patient sliding down (shear), and Frequent microturns using positioning wedges, as patient tolerates  Heels: Pillows under calves  Protective Dressing: Sacral Mepilex for prevention (#246668),  especially for the agitated patient   Positioning Equipment:None  Chair positioning: Chair cushion (#420798) , Assist patient to reposition hourly, and Do NOT use a donut for sitting (this increases pressure to smaller area and creates a higher potential for injury)    If patient has a buttock pressure injury, or high risk for PI use chair cushion or SPS.  Moisture Management: Perineal cleansing /protection: Follow Incontinence Protocol, Avoid brief in bed, Clean and dry skin folds with bathing , Consider InterDry (#914526) between folds, and Moisturize dry skin  Under Devices: Inspect skin under all medical devices during skin inspection , Ensure tubes are stabilized without tension, and Ensure patient is not lying on medical devices or equipment when repositioned  Ask provider to discontinue device when no longer needed.     Orders: Reviewed and Written    RECOMMEND PRIMARY TEAM ORDER: None, at this time  Education provided: importance of repositioning, plan of care, and wound progress  Discussed plan of care with: Patient and Nurse  Notify WOC if wound(s) deteriorate.  Nursing to notify the Provider(s) and " re-consult the [facility] Nurse if new skin concern.    DATA:     Current support surface: Standard  Standard gel mattress (Isoflex)  Containment of urine/stool: Incontinence Protocol and Indwelling catheter  BMI: Body mass index is 24.25 kg/m .   Active diet order: Orders Placed This Encounter      NPO for Medical/Clinical Reasons Except for: No Exceptions     Output: I/O last 3 completed shifts:  In: 3284.87 [I.V.:1919.87; NG/GT:150; IV Piggyback:1215]  Out: 1150 [Urine:1150]     Labs:   Recent Labs   Lab 03/25/25  0522 03/25/25  0401 03/22/25  0328 03/21/25  1717   ALBUMIN 2.9*  --    < > 2.2*   HGB  --  6.2*   < >  --    INR  --   --   --  2.27*   WBC  --  6.4   < >  --     < > = values in this interval not displayed.     Pressure injury risk assessment:   Sensory Perception: 1-->completely limited  Moisture: 1-->constantly moist  Activity: 1-->bedfast  Mobility: 1-->completely immobile  Nutrition: 1-->very poor  Friction and Shear: 1-->problem  Yogi Score: 6    Gaurang Barba RN, CWOCN  Pager no longer in use, please contact through [employer] group: [facility] Nurse [place]    Dept. Office Number: [phone]

## 2025-03-27 NOTE — PROGRESS NOTES
Vascular Access Services Notes:    Ultrasound-guided lab draw done without complication. Attempted x2 in the right forearm with a 22 gauge x 1.75 inch B Dodd PIV needle.  was present to assist.    Time spent with pt - 15 minutes      KATHE BarriosN, RN [facility]  Vascular Access Services  [facility]  [phone]

## 2025-03-27 NOTE — PLAN OF CARE
Problem: Adult Inpatient Plan of Care  Goal: Absence of Hospital-Acquired Illness or Injury  Intervention: Prevent Skin Injury  Recent Flowsheet Documentation  Taken 3/27/2025 0400 by Salome Pryor RN  Body Position:   turned   right  Skin Protection: incontinence pads utilized  Taken 3/27/2025 0200 by Salome Pryor RN  Body Position:   turned   left  Taken 3/27/2025 0000 by Salome Pryor RN  Body Position:   turned   right  Skin Protection: incontinence pads utilized  Taken 3/26/2025 2000 by Salome Pryor RN  Body Position:   turned   right  Skin Protection: incontinence pads utilized     ICU End of Shift Summary. See flowsheets for vital signs and detailed assessment.    Changes this shift: Pt extubated this shift at 2105, sats 95+ on 8-15L oxymask. Oriented to self only, intermittently able to make needs known, following commands. SR/SB. Maintaining MAP >65, increased pressor needs overnight. Phenylephrine from .2-3.5mcg/kg/min. L groin central line site noted with extensive bleeding, pressure held for >30min, provider at bedside to assist. Bleeding resolved, dressing in place with shadow drainage. Hypoglycemic X3, D50 administered with improved BS, provider notified. Continues on D10 at 75cc/hr.      Plan: Monitor pt condition, update team with any changes.     Salome Pryor RN

## 2025-03-27 NOTE — CONSULTS
SPIRITUAL HEALTH SERVICES  SPIRITUAL ASSESSMENT consult Note  [facility] ([place]) 4C  ON-CALL     REFERRAL SOURCE: Routine consult    Met with Aranza and her adult son, I offered prayer, which was accepted.     PLAN: Spiritual health services remains available for any follow-up or requests. For further visits please place spiritual health consults.    Paula De La Torre [facility]  Associate   Pager: [phone]

## 2025-03-27 NOTE — PROGRESS NOTES
Medical ICU PROGRESS NOTE  03/27/2025      Date of Service (when I saw the patient): 03/27/2025    ASSESSMENT: Alis Hartman is a 69 year old female with PMH of cervical cancer s/p radiation, serous endometrial adenocarcinoma s/p SNEHA/BSO (hysterectomy/ovarian removal) and cystotomy w/ suprapubic catheter placement (7/2024) as well as several cycles of carbo/taxel (10/2024), hx ESBL urosepsis and bacteremia, RNY gastric bypass, afib not on apixaban (stopped due to vaginal bleeding), HTN, CKD3 who was initially admitted with increased confusion, poor appetite and generalized weakness.  Her Hospital course complicated by MADHU, hypervolemia, acute on chronic pain, suspected dementia, and decreased oral intake with severe malnutrition on 3/20 there was plan for discharge with home hospice with continuation of medical cares to maximize patient's remaining time.  Patient began requiring higher level of oxygen support that was unable to be met by BiPAP on the evening of 3/20 and a rapid was called and after extensive discussion with family patient was intubated for airway protection. Patient had PEA arrest x2 following intubation. Now with concern for gastric ulcer perforation. Now partial comfort cares, discussing transition to full comfort cares with family.    CHANGES and MAJOR THINGS TODAY:   - Ongoing goals of care discussions with family  - Run phenylephrine through peripheral IV  - Continue fentanyl ggt for pain/comfort    PLAN:    Neuro:  # Pain and sedation  - RASS goal 0 to -1  - Fentanyl infusion  - buprenophine patch on hold     # C/f CVA  # C/f seizures  # New left eye deviation  # small age indeterminate (possibly recent) lacunar infarcts in bilateral thalami  # bilateral tremors of upper extremities  Patient with code stroke called due to acute mental status change and new left eye gaze deviation along with bilateral upper extremity tremors R>L.  Patient was unable to initially receive head CT due to  increasing oxygen requirements. She then had a PEA arrest. Did start some head imaging, however then coded a second time during the scan. Now responding to commands, moving eyes.  -CT head w/o: small age indeterminate (possibly recent) lacunar infarcts in bilateral thalami, no hemorrhage or mass effect  -CT head perfusion w/ contrast: not able to be completed  -CTA head and neck when stable: poor study, patient was coding during scan  -Neurology consulted with initial recs being:               - S/p keppra 3000mg IV load  - no further workup at this time pending [facility]  -Neurocrit is consulted to assess for seizures, EEG, and to recommend if we should get any further head imaging.   - Moderately diffuse nonspecific encephalopathy on EEG   - signed off at this time     # Hypothermia  Possible due to shock, post cardiac arrest. Could be related to other etiology such as sepsis.   - has bare hugger on  - cortisol and TSH normal     # Mixed etiology cognitive impairment: contributions from delirium, metabolic encephalopathy, dementia, schizophrenia   Patient initially presenting with confusion.  Has history of paranoia with suspected hallucinations.  Has also had a general decline with refusing to eat and poor nutrition for the past several days.  Patient's encephalopathy likely multifactorial given her poor oral intake, psychiatric history, and dementia component.  Feeding tube attempted on 3/14, but unable to be placed due to disorientation and difficulty with laying flat.  -IV thiamine   -Continue D10 fluids for now, nutrition plan as below  - zyprexa on hold      # Acute on Chronic Pain  Thigh, low back pain related to her coccyx.  Patient was started on buprenorphine patch.  Palliative following.  -Hold buprenorphine patch  -will monitor for signs of pain, currently would be ideal to not sedate patient to allow for an accurate neurological exam     Pulmonary:  # Acute hypoxemia respiratory failure, improving  #  Bilateral pleural effusions  Patient with increasing acute hypoxemia on BiPAP requiring intubation. Etiology remains unclear. Could consider PE as CXR was relatively unremarkable. Bedside POCUS not strongly convincing for right heart strain. CT chest shows no PE, but large bilateral pleural effusions present. Patient now extubated on oxymask after tolerating pressure support well. Respirations between 9-12 while on fentanyl ggt. No accessory muscle use or signs of respiratory distress.  - Wean oxymask as able      Cardiovascular:  # Shock, likely multifactorial, functional outflow tract obstruction noted on bedside POCUS in setting of hypovolemia, possible septic component as well  Patient requiring pressor support.  Had received fluid boluses during rapid response.  Bedside POCUS on 3/21 showed functional outflow tract obstruction in setting of hypovolemia. Formal TTE on 3/21 (done after an additional 1L of LR and 500 ml albumin) showed global and regional left ventricular function is normal with an EF of 60-65%, thickening of anterobasal septal is present, left ventricular filling pressures are increased. Global right ventricular function is normal, Mild to moderate tricuspid insufficiency is present. The right ventricular systolic pressure is 31mmHg above the right atrial pressure.Has significant third spacing, however, given outflow tract obstruction, volume depletion with diuresis risks worsening hypotension. Will continue to weigh risks/benefits of diuresis.  - Continue abx as discussed below  - Phenylephrine  - MAP goal >65     # PEA arrest x2  Patient with PEA arrest that occurred after intubation and once while in the CT scanner.  Patient achieved ROSC after several rounds of CPR. Suspect arrest was related to hypoxia and functional outflow tract obstruction.   -Plan for comfort care upon family's arrival.      # Elevated troponin   Elevated troponins, likely in the setting of demand ischemia. ECG   - no  wall motion abnormalities noted on TTE report     # Hx of Afib   Rate controlled. Prior admission had risk/benefit discussion and discontinued AC due to vaginal bleeding.         GI/Nutrition:  #C/f gastric ulcer perforation  3/21 CT shows discontinuity of anterior aspect of the gastric pouch, concerning for perforated marginal ulcer. Patient has had a 2 gm Hgb drop within the past day. Discussed case with GI, who does not recommend EGD due to high risk and no efficacy/clinical utility. Per General surgery, patient is not a surgical candidate given their acute illness. Discussed with family and it was agreed to transfuse blood, but not escalate cares given overall clinical picture.  - Current antibiotic coverage is sufficient     # Severe Protein-Calorie Malnutrition   # Decreased oral intake/Refusal to eat   # Hypoglycemia  # Hx Kiko en y  # Hypoalbuminemia  Patient has been refusing to eat, has not had good nutrition for several days. Calorie counts 3/11 to 3/15 showed 0 intake.   - Continue D10,  with TF off, can discontinue if/when decision to transition to comfort   - Nutrition consulted, appreciate assistance  - IV PPI      Renal/Fluids/Electrolytes:  #MADHU, oliguric, likely secondary to poor oral intake/malnutrition, renal labs currently stable  #Anasarca  #Dependent facial edema  # Hypokalemia  Creatinine at baseline 0.8-0.9. Renal US normal.  - Nephrology consulted this admission, signed off 3/16  - Lymphedema consulted, appreciate assistance  - Strict I's and O's, daily weights  - RN managed electrolyte replacements     Endocrine:  # Hypoglycemia   Previously patient has been hypoglycemic likely due to poor oral intake  -D10 fluids as above      ID:  # Pyuria, VRE in urine culture   # Hx of ESBL urosepsis and bacteremia  # Bladder dysfunction s/p cystostomy and suprapubic catheter  Recently admitted 11/26 - 12/8/2024 for ESBL urosepsis and bacteremia requiring ICU w/ left nephrostomy tube (removed 1/7)  treated w/ ertapenem, returned 1/25-1/27 for concern for UTI but no clear infection at that time. Urology collected UA during SPC exchange on 3/17 due to turbid urine and this is now growing VRE.   - Suprapubic catheter exchanged by urology on 3/17  - Continue linezolid for VRE   - Continue zosyn for asp pna      Hematology:    #Acute on chronic anemia likely secondary to chronic disease  #Thrombocytopenia, HIT ruled out  #Leukopenia (currently resolved)  # Leukocyctosis  Did require pRBC transfusion on 3/13. No signs of bleeding. Platelets have now decreased significantly from ~150 to <50. There was concern for HIT, but screening panel returned negative. Now also with leukopenia. Likely this is related to overall clinical decline and malnutrition, but will investigate other causes. On 3/22, 2 gm Hgb drop in 24 hours, concern for bowel perforation. Not a surgical candidate.  - Holding lovenox/heparin  - Smear sent     Oncology:  # Serous endometrial carcinoma  Follows w/ heme/onc through [place]. S/p SNEHA, BSO 07/2024 s/p cycle 3 carbo/taxel 10/15/2024, cycle 4 delayed in s/o recent admission, family ultimately decided to forgo any further treatment. During a care conference this admission, it was expressed that her cancer is likely to recur due to it being an aggressive type but when it will recur is unknown. However, should it recur GynOnc team expressed that because of her functional status (significant weakness) and malnutrition, treatment would not be recommended since that would worsen her health overall and benefits would not outweigh the risks.     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 CRP elevated, likely due to inflammation in the setting  "chronic wounds. Low concern for cellulitis.  - WOC consulted  - Urology recommends gauze or drain sponge around the site        Goals of care/ Social:   #Goals of care   #Recurrent hospitalizations with progressive step-wise decline  Per Chart Review, Care conference 3/20 with [facility] care, medicine, gyn onc. Patient's daughter and one of her cousins attended in person. The teams discussed that we are worried the patient is in the process of dying despite all of the treatment that she has been getting this admission. We recommended that we focus on keeping her comfortable for the time that she has remaining. Her daughter asked if this meant dying in the hospital and stated that she would want her mother at home. This led to a discussion about home hospice which the patient's daughter was agreeable to. Daughter stated that she \"wanted everything that could be done\" to be done clarifying with patient she stated that she would want her mother to be intubated.  Unfortunately patient experienced PEA arrest shortly after intubation and again after being sent down to the CT scanner.  Called family and discussed with them both times.  Discussed that by continuing to keep her CODE STATUS as full code that we may in fact be doing more harm to the patient than providing meaningful benefit for her at this time during this course of her care. Daughter states that at this time she would like to continue all interventions and to continue her mother's full CODE STATUS. On 3/22, this discussion was revisited after there was a new concern for bowel perforation. Transition to comfort cares was discussed and planned with Jeannie for 3/25, but she decided against this just prior to initiating cares. Patient was made DNR on 3/26, then DNI 3/27 after patient extubated; daughter and son express understanding.  -Ethics consult in place; recommend that there are competing ethic considerations and that we as professionals should not feel " compelled to violate professional judgment and perform interventions inappropriately     General Cares/Prophylaxis:    DVT Prophylaxis: SCD's  GI Prophylaxis: PPI  Restraints: none  Family Communication: Daughter  Code Status: FULL code as discussed above     Lines/tubes/drains:  - Port  - Shahid  - Art line  - CVC triple lumen   - ET tube in place      Disposition:  - Medical ICU      Patient seen and findings/plan discussed with medical ICU staff, Dr. Ding.    Philippe Stockton MD  PGY-1    ====================================  INTERVAL HISTORY:   Yesterday evening patient was extubated. She has since been sating well on 10L oxymask. Patient was requiring increasing pressors up to 3 phenylephrine. Central line was slightly pulled out and blood was unable to be drawn from the line, concerning for infiltration. PIV x2 established and phenylephrine was run peripherally at 1 mcg. Based on discussions with daughter Joy, patient was transitioned to DNR on 3/26. Joy expressed understanding that patient would no longer undergo chest compressions if her heart was to stop. Post-extubation on 3/27 patient was made DNI; this was discussed with son who expressed understanding that patient would no longer be intubated if her respiratory status was to worsen. Patient continues to have hypoglycemic episodes requiring activation of hypoglycemia protocol. Continue to bleed through catheter sites.    OBJECTIVE:   1. VITAL SIGNS:   Temp:  [95.9  F (35.5  C)-97.4  F (36.3  C)] 96.8  F (36  C)  Pulse:  [] 84  Resp:  [9-24] 14  BP: ()/() 90/55  FiO2 (%):  [30 %] 30 %  SpO2:  [68 %-100 %] 100 %  FiO2 (%): 30 %, Resp: 14, Vent Mode: CMV/AC, Resp Rate (Set): 12 breaths/min, Tidal Volume (Set, mL): 420 mL, PEEP (cm H2O): 5 cmH2O, Pressure Support (cm H2O): 5 cmH2O, Resp Rate (Set): 12 breaths/min, Tidal Volume (Set, mL): 420 mL, PEEP (cm H2O): 5 cmH2O  2. INTAKE/ OUTPUT:   I/O last 3 completed shifts:  In: 3136.75  [I.V.:2116.75; NG/GT:120; IV Piggyback:900]  Out: 1250 [Urine:1250]    3. PHYSICAL EXAMINATION:    GEN: intubated, NAD  EYES: pupils small but round and reactive to light  HEENT:  Normocephalic, atraumatic, trachea midline, ETT secure  CV: RRR  PULM/CHEST: Clear breath sounds bilaterally, mechanically ventilated   GI: abdomen semi firm, distended, bowel sounds hypoactive  : groin/thighs excoriated  EXTREMITIES: leg wraps in place, 3+ edema   NEURO: Moves arms and legs spontaneously. Grabs fingers on command and moves eyes.  SKIN: groin excoriation, leg wounds, leg wraps in place. See media tab     4. LABS:   Arterial Blood Gases   Recent Labs   Lab 03/22/25  2008 03/22/25  1802 03/22/25  1623 03/22/25  0102   PH 7.49* 7.47* 7.49* 7.46*   PCO2 23* 24* 22* 21*   PO2 180* 178* 182* 229*   HCO3 18* 17* 17* 15*     Complete Blood Count   Recent Labs   Lab 03/25/25  0401 03/24/25  2053 03/24/25  1141 03/24/25  0327   WBC 6.4 9.1 8.0 9.4   HGB 6.2* 6.9* 8.0* 7.4*   PLT 20* 24* 29* 28*     Basic Metabolic Panel  Recent Labs   Lab 03/27/25  1509 03/27/25  1344 03/27/25  1241 03/27/25  1121 03/25/25  0527 03/25/25  0522 03/25/25  0240 03/24/25  1148 03/24/25  0327 03/23/25  0824 03/23/25  0318 03/22/25  1415 03/22/25  0328   NA  --   --   --   --   --  135 136  --  140  --  139  --  138   POTASSIUM  --   --   --   --   --  3.0*  --   --  3.7  --  4.1  --  4.5   CHLORIDE  --   --   --   --   --  106  --   --  112*  --  111*  --  112*   CO2  --   --   --   --   --  15*  --   --  15*  --  15*  --  13*   BUN  --   --   --   --   --  16.4  --   --  18.0  --  19.1  --  18.5   CR  --   --   --   --   --  1.04*  --   --  1.13*  --  1.14*  --  1.07*   * 85 100* 96   < > 146*  --    < > 95   < > 85   < > 99    < > = values in this interval not displayed.     Liver Function Tests  Recent Labs   Lab 03/25/25  0522 03/24/25  0327 03/23/25  0318 03/22/25  0328 03/21/25  1717 03/21/25  0427 03/21/25  0010 03/20/25  2150   AST 43  57* 69* 71* 57*   < > 38 30   ALT 25 31 32 34 28   < > 23 25   ALKPHOS 98 129 132 128 101   < > 102 113   BILITOTAL 1.5* 1.3* 1.4* 0.9 0.8   < > 0.4 0.4   ALBUMIN 2.9* 2.2* 2.7* 2.3* 2.2*   < > 1.5* 1.8*   INR  --   --   --   --  2.27*  --  2.39* 1.61*    < > = values in this interval not displayed.     Coagulation Profile  Recent Labs   Lab 03/21/25  1717 03/21/25  0010 03/20/25  2150   INR 2.27* 2.39* 1.61*   PTT 67* 128* 51*       5. RADIOLOGY:   No results found for this or any previous visit (from the past 24 hours).

## 2025-03-28 VITALS
SYSTOLIC BLOOD PRESSURE: 98 MMHG | HEIGHT: 63 IN | BODY MASS INDEX: 24.26 KG/M2 | WEIGHT: 136.91 LBS | TEMPERATURE: 96.7 F | OXYGEN SATURATION: 95 % | RESPIRATION RATE: 16 BRPM | DIASTOLIC BLOOD PRESSURE: 57 MMHG | HEART RATE: 80 BPM

## 2025-03-28 LAB — HCV RNA SERPL NAA+PROBE-ACNC: NOT DETECTED IU/ML

## 2025-03-28 PROCEDURE — 99233 SBSQ HOSP IP/OBS HIGH 50: CPT | Mod: GC | Performed by: INTERNAL MEDICINE

## 2025-03-28 PROCEDURE — 120N000002 HC R&B MED SURG/OB UMMC

## 2025-03-28 PROCEDURE — 999N000147 HC STATISTIC PT IP EVAL DEFER

## 2025-03-28 PROCEDURE — 250N000011 HC RX IP 250 OP 636

## 2025-03-28 PROCEDURE — 999N000111 HC STATISTIC OT IP EVAL DEFER

## 2025-03-28 PROCEDURE — 99231 SBSQ HOSP IP/OBS SF/LOW 25: CPT

## 2025-03-28 RX ORDER — CARBOXYMETHYLCELLULOSE SODIUM 5 MG/ML
1-2 SOLUTION/ DROPS OPHTHALMIC
Status: DISCONTINUED | OUTPATIENT
Start: 2025-03-28 | End: 2025-04-04 | Stop reason: HOSPADM

## 2025-03-28 RX ORDER — NALOXONE HYDROCHLORIDE 0.4 MG/ML
0.1 INJECTION, SOLUTION INTRAMUSCULAR; INTRAVENOUS; SUBCUTANEOUS
Status: DISCONTINUED | OUTPATIENT
Start: 2025-03-28 | End: 2025-04-04 | Stop reason: HOSPADM

## 2025-03-28 RX ORDER — LORAZEPAM 2 MG/ML
1 INJECTION INTRAMUSCULAR
Status: DISCONTINUED | OUTPATIENT
Start: 2025-03-28 | End: 2025-04-04 | Stop reason: HOSPADM

## 2025-03-28 RX ORDER — BISACODYL 10 MG
10 SUPPOSITORY, RECTAL RECTAL
Status: DISCONTINUED | OUTPATIENT
Start: 2025-03-31 | End: 2025-04-04 | Stop reason: HOSPADM

## 2025-03-28 RX ORDER — SENNOSIDES 8.6 MG
1 TABLET ORAL 2 TIMES DAILY PRN
Status: DISCONTINUED | OUTPATIENT
Start: 2025-03-28 | End: 2025-04-04 | Stop reason: HOSPADM

## 2025-03-28 RX ORDER — MINERAL OIL/HYDROPHIL PETROLAT
OINTMENT (GRAM) TOPICAL
Status: DISCONTINUED | OUTPATIENT
Start: 2025-03-28 | End: 2025-04-04 | Stop reason: HOSPADM

## 2025-03-28 RX ORDER — LORAZEPAM 1 MG/1
1 TABLET ORAL
Status: DISCONTINUED | OUTPATIENT
Start: 2025-03-28 | End: 2025-04-04 | Stop reason: HOSPADM

## 2025-03-28 RX ORDER — NALOXONE HYDROCHLORIDE 0.4 MG/ML
0.2 INJECTION, SOLUTION INTRAMUSCULAR; INTRAVENOUS; SUBCUTANEOUS
Status: DISCONTINUED | OUTPATIENT
Start: 2025-03-28 | End: 2025-04-04 | Stop reason: HOSPADM

## 2025-03-28 RX ADMIN — Medication 150 MCG/HR: at 14:16

## 2025-03-28 RX ADMIN — Medication 50 MCG: at 01:37

## 2025-03-28 RX ADMIN — Medication 75 MCG: at 14:53

## 2025-03-28 RX ADMIN — Medication 50 MCG: at 14:16

## 2025-03-28 RX ADMIN — Medication 50 MCG: at 10:00

## 2025-03-28 RX ADMIN — Medication 75 MCG: at 10:14

## 2025-03-28 RX ADMIN — Medication 150 MCG/HR: at 17:57

## 2025-03-28 RX ADMIN — Medication 50 MCG: at 08:26

## 2025-03-28 RX ADMIN — Medication 50 MCG: at 08:13

## 2025-03-28 RX ADMIN — Medication 100 MCG/HR: at 03:26

## 2025-03-28 ASSESSMENT — ACTIVITIES OF DAILY LIVING (ADL)
ADLS_ACUITY_SCORE: 96

## 2025-03-28 NOTE — PROGRESS NOTES
This writer at bedside, all relevant meds and interventions d/malorie at 2049 per orders, pt transitioned to comfort cares. Family at bedside, cares and procedures explained. Family verbalizes understanding.     Salome Pryor RN

## 2025-03-28 NOTE — PLAN OF CARE
Transferred to:7C at   Status at time of transfer:off telly, on comfort cares, RA  Belongings: send with patient   Shahid removed? (if no, why?): NO, supra pubic catheter   Chart and medications: send with patients  Family notified:  yes , daughter       Problem: Adult Inpatient Plan of Care  Goal: Absence of Hospital-Acquired Illness or Injury  Intervention: Prevent Skin Injury  Recent Flowsheet Documentation  Taken 3/28/2025 1400 by Pinky Granger RN  Body Position: turned  Taken 3/28/2025 1200 by Pinky Granger RN  Body Position: turned  Taken 3/28/2025 1000 by Pinky Granger RN  Body Position:   turned   supine, head elevated   sitting up in bed  Taken 3/28/2025 0800 by Pinky Granger RN  Body Position:   turned   supine, head elevated

## 2025-03-28 NOTE — PROGRESS NOTES
Medical ICU PROGRESS NOTE  03/28/2025      Date of Service (when I saw the patient): 03/28/2025    ASSESSMENT: Alis Hartman is a 69 year old female with PMH of cervical cancer s/p radiation, serous endometrial adenocarcinoma s/p SNEHA/BSO (hysterectomy/ovarian removal) and cystotomy w/ suprapubic catheter placement (7/2024) as well as several cycles of carbo/taxel (10/2024), hx ESBL urosepsis and bacteremia, RNY gastric bypass, afib not on apixaban (stopped due to vaginal bleeding), HTN, CKD3 who was initially admitted with increased confusion, poor appetite and generalized weakness.  Her Hospital course complicated by MADHU, hypervolemia, acute on chronic pain, suspected dementia, and decreased oral intake with severe malnutrition on 3/20 there was plan for discharge with home hospice with continuation of medical cares to maximize patient's remaining time.  Patient began requiring higher level of oxygen support that was unable to be met by BiPAP on the evening of 3/20 and a rapid was called and after extensive discussion with family patient was intubated for airway protection. Patient had PEA arrest x2 following intubation. Now with concern for gastric ulcer perforation. Now on comfort cares 3/28.    CHANGES and MAJOR THINGS TODAY:   - Comfort cares  - Transfer to floor    PLAN:    Neuro:  # Pain and sedation  - Fentanyl infusion, fentanyl push  - buprenophine patch on hold    # C/f CVA  # C/f seizures  # New left eye deviation  # small age indeterminate (possibly recent) lacunar infarcts in bilateral thalami  # bilateral tremors of upper extremities  Patient with code stroke called due to acute mental status change and new left eye gaze deviation along with bilateral upper extremity tremors R>L.  Patient was unable to initially receive head CT due to increasing oxygen requirements. She then had a PEA arrest. Did start some head imaging, however then coded a second time during the scan. Now responding to  commands, moving eyes.  -CT head w/o: small age indeterminate (possibly recent) lacunar infarcts in bilateral thalami, no hemorrhage or mass effect  -CT head perfusion w/ contrast: not able to be completed  -CTA head and neck when stable: poor study, patient was coding during scan  - No acute intervention now on comfort cares    Pulmonary:  # Acute hypoxemia respiratory failure, improving  # Bilateral pleural effusions  Patient with increasing acute hypoxemia on BiPAP requiring intubation. Etiology remains unclear. Could consider PE as CXR was relatively unremarkable. Bedside POCUS not strongly convincing for right heart strain. CT chest shows no PE, but large bilateral pleural effusions present. Patient now extubated on oxymask after tolerating pressure support well. Respirations between 9-12 while on fentanyl ggt. No accessory muscle use or signs of respiratory distress.  - No acute interventions now on comfort cares      Cardiovascular:  # Shock, likely multifactorial, functional outflow tract obstruction noted on bedside POCUS in setting of hypovolemia, possible septic component as well  Patient requiring pressor support.  Had received fluid boluses during rapid response.  Bedside POCUS on 3/21 showed functional outflow tract obstruction in setting of hypovolemia. Formal TTE on 3/21 (done after an additional 1L of LR and 500 ml albumin) showed global and regional left ventricular function is normal with an EF of 60-65%, thickening of anterobasal septal is present, left ventricular filling pressures are increased. Global right ventricular function is normal, Mild to moderate tricuspid insufficiency is present. The right ventricular systolic pressure is 31mmHg above the right atrial pressure.Has significant third spacing, however, given outflow tract obstruction, volume depletion with diuresis risks worsening hypotension. Will continue to weigh risks/benefits of diuresis.  - No acute interventions now on comfort  cares     GI/Nutrition:  #C/f gastric ulcer perforation  3/21 CT shows discontinuity of anterior aspect of the gastric pouch, concerning for perforated marginal ulcer. Patient has had a 2 gm Hgb drop within the past day. Discussed case with GI, who does not recommend EGD due to high risk and no efficacy/clinical utility. Per General surgery, patient is not a surgical candidate given their acute illness. Discussed with family and it was agreed to transfuse blood, but not escalate cares given overall clinical picture.  - No acute interventions now on comfort cares    # Severe Protein-Calorie Malnutrition   # Decreased oral intake/Refusal to eat   # Hypoglycemia  # Hx Kiko en y  # Hypoalbuminemia  Patient has been refusing to eat, has not had good nutrition for several days. Calorie counts 3/11 to 3/15 showed 0 intake.   - Continue D10, ongoing discussion about continuing while on comfort cares given frequent hypoglycemia      Renal/Fluids/Electrolytes:  #MADHU, oliguric, likely secondary to poor oral intake/malnutrition, renal labs currently stable  #Anasarca  #Dependent facial edema  # Hypokalemia  Creatinine at baseline 0.8-0.9. Renal US normal.  - No acute interventions now that patient is comfort cares     Endocrine:  # Hypoglycemia   Previously patient has been hypoglycemic likely due to poor oral intake  -D10 fluids as above      ID:  # Pyuria, VRE in urine culture   # Hx of ESBL urosepsis and bacteremia  # Bladder dysfunction s/p cystostomy and suprapubic catheter  Recently admitted 11/26 - 12/8/2024 for ESBL urosepsis and bacteremia requiring ICU w/ left nephrostomy tube (removed 1/7) treated w/ ertapenem, returned 1/25-1/27 for concern for UTI but no clear infection at that time. Urology collected UA during SPC exchange on 3/17 due to turbid urine and this is now growing VRE.   - No acute interventions now that patient is comfort cares     Hematology:    #Acute on chronic anemia likely secondary to chronic  disease  #Thrombocytopenia, HIT ruled out  #Leukopenia (currently resolved)  # Leukocyctosis  Did require pRBC transfusion on 3/13. No signs of bleeding. Platelets have now decreased significantly from ~150 to <50. There was concern for HIT, but screening panel returned negative. Now also with leukopenia. Likely this is related to overall clinical decline and malnutrition, but will investigate other causes. On 3/22, 2 gm Hgb drop in 24 hours, concern for bowel perforation. Not a surgical candidate.  - No acute interventions now that patient is comfort cares     Oncology:  # Serous endometrial carcinoma  Follows w/ heme/onc through [place]. S/p SNEHA, BSO 07/2024 s/p cycle 3 carbo/taxel 10/15/2024, cycle 4 delayed in s/o recent admission, family ultimately decided to forgo any further treatment. During a care conference this admission, it was expressed that her cancer is likely to recur due to it being an aggressive type but when it will recur is unknown. However, should it recur GynOnc team expressed that because of her functional status (significant weakness) and malnutrition, treatment would not be recommended since that would worsen her health overall and benefits would not outweigh the risks.      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 CRP elevated, likely due to inflammation in the setting chronic wounds. Low concern for cellulitis.  - WOC consulted  - Urology recommends gauze or drain sponge around the site        Goals of care/ Social:   #Goals of care   #Recurrent hospitalizations with progressive step-wise decline  Per Chart Review, Care conference 3/20 with [facility], medicine, gyn onc. Patient's daughter and one of her cousins attended in  "person. The teams discussed that we are worried the patient is in the process of dying despite all of the treatment that she has been getting this admission. We recommended that we focus on keeping her comfortable for the time that she has remaining. Her daughter asked if this meant dying in the hospital and stated that she would want her mother at home. This led to a discussion about home hospice which the patient's daughter was agreeable to. Daughter stated that she \"wanted everything that could be done\" to be done clarifying with patient she stated that she would want her mother to be intubated.  Unfortunately patient experienced PEA arrest shortly after intubation and again after being sent down to the CT scanner.  Called family and discussed with them both times.  Discussed that by continuing to keep her CODE STATUS as full code that we may in fact be doing more harm to the patient than providing meaningful benefit for her at this time during this course of her care. Daughter states that at this time she would like to continue all interventions and to continue her mother's full CODE STATUS. On 3/22, this discussion was revisited after there was a new concern for bowel perforation. Transition to comfort cares was discussed and planned with Jeannie for 3/25, but she decided against this just prior to initiating cares. Patient was made DNR on 3/26, then DNI 3/27 after patient extubated; daughter and son express understanding. Transitioned to comfor     General Cares/Prophylaxis:    DVT Prophylaxis: SCD's  GI Prophylaxis: PPI  Restraints: none  Family Communication: Daughter  Code Status: FULL code as discussed above     Lines/tubes/drains:  - Port  - Shahid  - Art line  - CVC triple lumen   - ET tube in place      Disposition:  - Medical ICU      Patient seen and findings/plan discussed with medical ICU staff, Dr. Ding.    Philippe Stockton MD  PGY-1    ====================================  INTERVAL HISTORY:   Patient " was transitioned to comfort cares overnight 3/28.    OBJECTIVE:   1. VITAL SIGNS:   Temp:  [96.4  F (35.8  C)-96.7  F (35.9  C)] 96.7  F (35.9  C)  Pulse:  [65-86] 80  Resp:  [9-22] 16  BP: ()/(48-74) 98/57  SpO2:  [95 %-100 %] 95 %  Resp: 16  2. INTAKE/ OUTPUT:   I/O last 3 completed shifts:  In: 659.38 [I.V.:659.38]  Out: 1000 [Urine:1000]    3. PHYSICAL EXAMINATION:    GEN: intubated, NAD  EYES: pupils small but round and reactive to light  HEENT:  Normocephalic, atraumatic, trachea midline, ETT secure  CV: RRR  PULM/CHEST: Clear breath sounds bilaterally, mechanically ventilated   GI: abdomen semi firm, distended, bowel sounds hypoactive  : groin/thighs excoriated  EXTREMITIES: leg wraps in place, 3+ edema   NEURO: Moves arms and legs spontaneously. Grabs fingers on command and moves eyes.  SKIN: groin excoriation, leg wounds, leg wraps in place. See media tab     4. LABS:   Arterial Blood Gases   Recent Labs   Lab 03/22/25  2008 03/22/25  1802 03/22/25  1623 03/22/25  0102   PH 7.49* 7.47* 7.49* 7.46*   PCO2 23* 24* 22* 21*   PO2 180* 178* 182* 229*   HCO3 18* 17* 17* 15*     Complete Blood Count   Recent Labs   Lab 03/25/25  0401 03/24/25  2053 03/24/25  1141 03/24/25  0327   WBC 6.4 9.1 8.0 9.4   HGB 6.2* 6.9* 8.0* 7.4*   PLT 20* 24* 29* 28*     Basic Metabolic Panel  Recent Labs   Lab 03/27/25  1829 03/27/25  1730 03/27/25  1633 03/27/25  1509 03/25/25  0527 03/25/25  0522 03/25/25  0240 03/24/25  1148 03/24/25  0327 03/23/25  0824 03/23/25 0318 03/22/25 1415 03/22/25 0328   NA  --   --   --   --   --  135 136  --  140  --  139  --  138   POTASSIUM  --   --   --   --   --  3.0*  --   --  3.7  --  4.1  --  4.5   CHLORIDE  --   --   --   --   --  106  --   --  112*  --  111*  --  112*   CO2  --   --   --   --   --  15*  --   --  15*  --  15*  --  13*   BUN  --   --   --   --   --  16.4  --   --  18.0  --  19.1  --  18.5   CR  --   --   --   --   --  1.04*  --   --  1.13*  --  1.14*  --  1.07*   GLC  83 83 93 102*   < > 146*  --    < > 95   < > 85   < > 99    < > = values in this interval not displayed.     Liver Function Tests  Recent Labs   Lab 03/25/25  0522 03/24/25 0327 03/23/25 0318 03/22/25 0328 03/21/25  1717   AST 43 57* 69* 71* 57*   ALT 25 31 32 34 28   ALKPHOS 98 129 132 128 101   BILITOTAL 1.5* 1.3* 1.4* 0.9 0.8   ALBUMIN 2.9* 2.2* 2.7* 2.3* 2.2*   INR  --   --   --   --  2.27*     Coagulation Profile  Recent Labs   Lab 03/21/25 1717   INR 2.27*   PTT 67*       5. RADIOLOGY:   No results found for this or any previous visit (from the past 24 hours).

## 2025-03-28 NOTE — PROGRESS NOTES
[facility]    Medicine Progress Note - Hospitalist Service, GOLD TEAM     Date of Admission:  3/4/2025    Assessment & Plan   Alis Hartman is a 69 year old female with PMH of cervical cancer s/p radiation, serous endometrial adenocarcinoma s/p SNEHA/BSO (hysterectomy/ovarian removal) and cystotomy w/ suprapubic catheter placement (7/2024) as well as several cycles of carbo/taxel (10/2024), hx ESBL urosepsis and bacteremia, RNY gastric bypass, afib not on apixaban (stopped due to vaginal bleeding), HTN, CKD3, who was initially admitted to [facility] for further evaluation and treatment of increased confusion, poor appetite and generalized weakness.  Hospital course has been complicated by MADHU, hypervolemia, acute on chronic pain, suspected dementia, and decreased oral intake. Transferred to the ICU 3/20 due to respiratory decline. Was intubated and had PEA arrest x2 following intubation. There was also concern for gastric perforation. Transitioned to comfort cares 3/27. Medicine assumed cares as primary 3/28.     Comfort Cares  Prior to intubation and ICU transfer, there was plans to discharge home on hospice on 3/20. After long discussion with ICU team and palliative care, patient transitioned to full comfort cares on 3/27. Transferred to medicine- still awake and breathing comfortably on room air. Will consult GIP to determine if appropriate for inpatient hospice vs home with hospice.   - GIP consult  - Continue supportive care   - Artificial saliva QID  - Butrans patch in place (applied weekly)  - Robinul PRN  - Fentanyl drip  - Zofran PRN        Diet: NPO for Medical/Clinical Reasons Except for: No Exceptions    DVT Prophylaxis: N/A  Shahid Catheter: Not present  Lines: PRESENT             Cardiac Monitoring: ACTIVE order. Indication: Stroke, acute (48 hours)  Code Status: No CPR- Do NOT Intubate      Clinically Significant Risk Factors               # Hypoalbuminemia:  Lowest albumin = 1.5 g/dL at 3/21/2025 12:10 AM, will monitor as appropriate     # Hypertension: Noted on problem list             # Severe Malnutrition: based on nutrition assessment and treatment provided per dietitian's recommendations.    # Financial/Environmental Concerns: none         Social Drivers of Health    Food Insecurity: Unknown (3/12/2025)    Food Insecurity     Within the past 12 months, did you worry that your food would run out before you got money to buy more?: Patient unable to answer     Within the past 12 months, did the food you bought just not last and you didn t have money to get more?: Patient unable to answer   Housing Stability: Unknown (3/12/2025)    Housing Stability     Do you have housing? : Patient unable to answer     Are you worried about losing your housing?: Patient unable to answer   Financial Resource Strain: Unknown (3/12/2025)    Financial Resource Strain     Within the past 12 months, have you or your family members you live with been unable to get utilities (heat, electricity) when it was really needed?: Patient unable to answer   Transportation Needs: Unknown (3/12/2025)    Transportation Needs     Within the past 12 months, has lack of transportation kept you from medical appointments, getting your medicines, non-medical meetings or appointments, work, or from getting things that you need?: Patient unable to answer   Interpersonal Safety: Unknown (3/12/2025)    Interpersonal Safety     Do you feel physically and emotionally safe where you currently live?: Patient unable to answer     Within the past 12 months, have you been hit, slapped, kicked or otherwise physically hurt by someone?: Patient unable to answer     Within the past 12 months, have you been humiliated or emotionally abused in other ways by your partner or ex-partner?: Patient unable to answer          Disposition Plan     Medically Ready for Discharge: TBD           The patient's care was discussed with the  Patient and Patient's Family.    Parul Das PA-C  Hospitalist Service, [facility]  Securely message with Sidecar (more info)  Text page via [employer] Paging/Directory   See signed in provider for up to date coverage information  ______________________________________________________________________    Interval History   Transferred to medicine for comfort cares. GIP consult. Updated daughter.     Physical Exam   Vital Signs: Temp: (!) 96.7  F (35.9  C) Temp src: Axillary BP: 98/57 Pulse: 80   Resp: 16 SpO2: 95 % O2 Device: None (Room air) Oxygen Delivery: 10 LPM  Weight: 136 lbs 14.49 oz    General Appearance: Comfortable, ill appearing female seen laying in bed.  HEENT: Atraumatic.  Respiratory: Breathing comfortably on supplemental oxygen.   Musculoskeletal: Moving all extremities spontaneously.  Neurologic: Unable to assess. Looks at writer, but does not respond.   Psychiatric: Unable to assess.       Medical Decision Making       45 MINUTES SPENT BY ME on the date of service doing chart review, history, exam, documentation & further activities per the note.      Data   Imaging results reviewed over the past 24 hrs:   No results found for this or any previous visit (from the past 24 hours).  Recent Labs   Lab 03/27/25  1829 03/27/25  1730 03/27/25  1633 03/25/25  0527 03/25/25  0522 03/25/25  0401 03/25/25  0240 03/24/25  2056 03/24/25  2053 03/24/25  1148 03/24/25  1141 03/24/25  0327 03/23/25  0824 03/23/25  0318 03/21/25  1722 03/21/25  1717   WBC  --   --   --   --   --  6.4  --   --  9.1  --  8.0 9.4   < > 8.6   < >  --    HGB  --   --   --   --   --  6.2*  --   --  6.9*  --  8.0* 7.4*   < > 8.3*   < >  --    MCV  --   --   --   --   --  96  --   --  96  --  93 93   < > 91   < >  --    PLT  --   --   --   --   --  20*  --   --  24*  --  29* 28*   < > 31*   < >  --    INR  --   --   --   --   --   --   --   --   --   --   --   --   --   --   --   2.27*   NA  --   --   --   --  135  --  136  --   --   --   --  140  --  139   < > 136   POTASSIUM  --   --   --   --  3.0*  --   --   --   --   --   --  3.7  --  4.1   < > 4.0   CHLORIDE  --   --   --   --  106  --   --   --   --   --   --  112*  --  111*   < > 111*   CO2  --   --   --   --  15*  --   --   --   --   --   --  15*  --  15*   < > 14*   BUN  --   --   --   --  16.4  --   --   --   --   --   --  18.0  --  19.1   < > 16.9   CR  --   --   --   --  1.04*  --   --   --   --   --   --  1.13*  --  1.14*   < > 0.91   ANIONGAP  --   --   --   --  14  --   --   --   --   --   --  13  --  13   < > 11   SAMUEL  --   --   --   --  7.6*  --   --   --   --   --   --  7.8*  --  8.2*   < > 7.4*   GLC 83 83 93   < > 146*  --   --    < >  --    < >  --  95   < > 85   < > 157*   ALBUMIN  --   --   --   --  2.9*  --   --   --   --   --   --  2.2*  --  2.7*   < > 2.2*   PROTTOTAL  --   --   --   --  4.0*  --   --   --   --   --   --  3.6*  --  4.0*   < > 3.2*   BILITOTAL  --   --   --   --  1.5*  --   --   --   --   --   --  1.3*  --  1.4*   < > 0.8   ALKPHOS  --   --   --   --  98  --   --   --   --   --   --  129  --  132   < > 101   ALT  --   --   --   --  25  --   --   --   --   --   --  31  --  32   < > 28   AST  --   --   --   --  43  --   --   --   --   --   --  57*  --  69*   < > 57*    < > = values in this interval not displayed.

## 2025-03-28 NOTE — PLAN OF CARE
Occupational Therapy Discharge Summary    Reason for therapy discharge:    Change in medical status. Pt transitioned to hospice cares.     Progress towards therapy goal(s). See goals on Care Plan in Epic electronic health record for goal details.  Goals not met.  Barriers to achieving goals:   limited tolerance for therapy.    Therapy recommendation(s):    No further therapy is recommended.

## 2025-03-28 NOTE — PLAN OF CARE
ICU End of Shift Summary. See flowsheets for vital signs and detailed assessment.    Changes this shift: Alert, confused. Endorses pain. 100 of fentanyl with frequent 25 bolus. SR 60-80. Low temp. T pump and warm blankets in place, kicks off bare hugger. Oxymask 15L. Diminished lung sounds. Low blood sugars, refused blood sugar after hypoglycemic episode, d/t confusion. D50 given frequently for hypoglycemia, d10 at 100/hour. Suprapubic cath and purewick in place. Thigh and sacral wounds, with copious weeping, vaseline applied q2 with turns.    Plan: anticipate initiating comfort cares. Report changes to MICU.

## 2025-03-28 NOTE — PROGRESS NOTES
Patient transferred from . Is alert soft spoken. Complains of pain in the butt crack. Rearranged all blankets and turned to side and patient better. Multiple areas from buttock to knees is just raw skin like that is draining. Patient asked to be oral suctioned and that helped on fentanyl pca.

## 2025-03-28 NOTE — PROGRESS NOTES
[facility]    Consult Note - [facility]  _________________________________________________________________    [facility] 24/7 Contact Number: [phone]    - Providers: Please contact [facility] with changes in orders or clinical plan of care   - Nursing: Please contact [facility] with significant changes in patient condition    Hospice will notify the care team (including the hospitalist) to confirm date of inpatient hospice (GIP) admission.    New Epic encounter will not be created until hospice completes admission.   ______________________________________________________________________        Summary of Visit (includes assessment, medications and any new orders):     Hospice RN spoke with patient's daughter, Joy on the phone regarding hospice. Joy would like tomorrow afternoon, around 1:30 p.m. to discuss hospice further and if patient would possibly be able to discharge home or to a facility on hospice care.    Thank you for the referral!     Abel Jackson RN

## 2025-03-28 NOTE — CONSULTS
SPIRITUAL HEALTH SERVICES - Consult Note [facility]C     Referral Source/Reason for Visit:  Emergency consult - move to comfort care      Summary and Recommendations -  * Aranza has appreciated prayer and asked for spiritual comfort as her family is here with her.  I provided a reading of Psalm 23 and a prayer.  Told the family that if they needed me further this evening they could call and I would come back in.         Kristy Zaman   Chaplain Resident  Pager [phone]    [facility] available 24/7 for emergent requests/referrals, either by paging the on-call  or by entering an ASAP/STAT consult in [employer], which will also page the on-call .         * [facility] remains available 24/7 for emergent requests/referrals, either by having the switchboard page the on-call  or by entering an ASAP/STAT consult in Epic (this will also page the on-call ). Routine Epic consults receive an initial respon      _________

## 2025-03-28 NOTE — PLAN OF CARE
Goal Outcome Evaluation:       Shift Hours: 1500 - 1900    Assessment:  Body systems assessments were at patient's baseline.        Activity     Fall Risk Score: 30   Bed alarm on? Yes     Activity Assistance Provided: assistance, 2 people      Assistive Device Utilized: lift device    Pain: throughout body  pca continous     Labs/RN Managed Protocols:     Lines/Drains: piv left     Nutrition: regular diet for comfort                Barriers to Discharge:   Jeanmarieshaina would like her to go home on hospice will meet with hospice 3/29

## 2025-03-29 PROCEDURE — 120N000002 HC R&B MED SURG/OB UMMC

## 2025-03-29 PROCEDURE — 99231 SBSQ HOSP IP/OBS SF/LOW 25: CPT | Mod: FS | Performed by: INTERNAL MEDICINE

## 2025-03-29 PROCEDURE — 250N000013 HC RX MED GY IP 250 OP 250 PS 637

## 2025-03-29 PROCEDURE — 99207 PR APP CREDIT; MD BILLING SHARED VISIT: CPT | Performed by: PHYSICIAN ASSISTANT

## 2025-03-29 PROCEDURE — 250N000011 HC RX IP 250 OP 636

## 2025-03-29 RX ADMIN — Medication 150 MCG/HR: at 08:45

## 2025-03-29 RX ADMIN — Medication 200 MCG/HR: at 23:41

## 2025-03-29 RX ADMIN — Medication 175 MCG/HR: at 12:12

## 2025-03-29 RX ADMIN — Medication 200 MCG/HR: at 12:50

## 2025-03-29 RX ADMIN — LORAZEPAM 1 MG: 1 TABLET ORAL at 12:25

## 2025-03-29 ASSESSMENT — ACTIVITIES OF DAILY LIVING (ADL)
ADLS_ACUITY_SCORE: 96

## 2025-03-29 NOTE — PROGRESS NOTES
[facility]    Medicine Progress Note - Hospitalist Service, GOLD TEAM 2    Date of Admission:  3/4/2025    Assessment & Plan   Alis Hartman is a 69 year old female with PMH of cervical cancer s/p radiation, serous endometrial adenocarcinoma s/p SNEHA/BSO (hysterectomy/ovarian removal) and cystotomy w/ suprapubic catheter placement (7/2024) as well as several cycles of carbo/taxel (10/2024), hx ESBL urosepsis and bacteremia, RNY gastric bypass, afib not on apixaban (stopped due to vaginal bleeding), HTN, CKD3, who was initially admitted to [facility] for further evaluation and treatment of increased confusion, poor appetite and generalized weakness.  Hospital course has been complicated by MADHU, hypervolemia, acute on chronic pain, suspected dementia, and decreased oral intake. Transferred to the ICU 3/20 due to respiratory decline. Was intubated and had PEA arrest x2 following intubation. There was also concern for gastric perforation. Transitioned to comfort cares 3/27. Medicine assumed cares as primary 3/28.      Comfort Cares: Prior to intubation and ICU transfer, there was plans to discharge home on hospice on 3/20. After long discussion with ICU team and palliative care, patient transitioned to full comfort cares on 3/27. Transferred to medicine- still awake and breathing comfortably on room air.   - GIP consult, meeting with family this afternoon to discuss disposition options. Would be medically stable for transport to home or facility pending logistics of Fentanyl infusion.  - Continue supportive care   - Artificial saliva QID  - Butrans patch in place (applied weekly)  - Robinul PRN  - Fentanyl gtt  - Zofran PRN          Diet: NPO for Medical/Clinical Reasons Except for: No Exceptions    DVT Prophylaxis: N/A  Shahid Catheter: Not present  Lines: PRESENT             Cardiac Monitoring: None  Code Status: No CPR- Do NOT Intubate      Clinically Significant Risk  Factors               # Hypoalbuminemia: Lowest albumin = 1.5 g/dL at 3/21/2025 12:10 AM, will monitor as appropriate     # Hypertension: Noted on problem list             # Severe Malnutrition: based on nutrition assessment and treatment provided per dietitian's recommendations.    # Financial/Environmental Concerns: none         Social Drivers of Health    Food Insecurity: Unknown (3/12/2025)    Food Insecurity     Within the past 12 months, did you worry that your food would run out before you got money to buy more?: Patient unable to answer     Within the past 12 months, did the food you bought just not last and you didn t have money to get more?: Patient unable to answer   Housing Stability: Unknown (3/12/2025)    Housing Stability     Do you have housing? : Patient unable to answer     Are you worried about losing your housing?: Patient unable to answer   Financial Resource Strain: Unknown (3/12/2025)    Financial Resource Strain     Within the past 12 months, have you or your family members you live with been unable to get utilities (heat, electricity) when it was really needed?: Patient unable to answer   Transportation Needs: Unknown (3/12/2025)    Transportation Needs     Within the past 12 months, has lack of transportation kept you from medical appointments, getting your medicines, non-medical meetings or appointments, work, or from getting things that you need?: Patient unable to answer   Interpersonal Safety: Unknown (3/12/2025)    Interpersonal Safety     Do you feel physically and emotionally safe where you currently live?: Patient unable to answer     Within the past 12 months, have you been hit, slapped, kicked or otherwise physically hurt by someone?: Patient unable to answer     Within the past 12 months, have you been humiliated or emotionally abused in other ways by your partner or ex-partner?: Patient unable to answer          Disposition Plan     Medically Ready for Discharge: Anticipated in  5+ Days           The patient's care was discussed with the Attending Physician, Dr. Leyva, Patient, and Patient's Family.    Jacquelin Winston PA-C  Hospitalist Service, [facility] TEAM [address] [place] [address]  Securely message with [employer] (more info)  Text page via [facility] Paging/Directory   See signed in provider for up to date coverage information  ______________________________________________________________________    Interval History   More restless and agitated later this morning. Fentanyl infusion increased to 200 mcg/hr with improvement in her discomfort. Daughter Joy updated via phone.    Physical Exam   Vital Signs:                    Weight: 136 lbs 14.49 oz    Awake, sitting up in bed, appears comfortable.      Medical Decision Making       55 MINUTES SPENT BY ME on the date of service doing chart review, history, exam, documentation & further activities per the note.      Data   ------------------------- PAST 24 HR DATA REVIEWED -----------------------------------------------        Imaging results reviewed over the past 24 hrs:   No results found for this or any previous visit (from the past 24 hours).

## 2025-03-29 NOTE — PROGRESS NOTES
"Care Management Follow Up    Length of Stay (days): 25    Expected Discharge Date: 03/31/2025     Concerns to be Addressed:  Ongoing POC, Family dynamics   Patient plan of care discussed at interdisciplinary rounds: Yes    Anticipated Discharge Disposition:  TBD    Anticipated Discharge Services:  TBD  Anticipated Discharge DME:  TBD    Patient/family educated on Medicare website which has current facility and service quality ratings:  no  Education Provided on the Discharge Plan:  ongoing  Patient/Family in Agreement with the Plan:  unable to assess    Referrals Placed by CM/SW:  none  Private pay costs discussed: Not applicable    Discussed  Partnership in Safe Discharge Planning  document with patient/family: No     Handoff Completed: No, handoff not indicated or clinically appropriate    Additional Information:  SW informed by bedside nurse that pt is going to meet with [facility] hosp[facility] this afternoon.  Per bedside nurse, pt's family is hoping to bring pt home on hospice but pt is not stable for transport.  SW placed pt on weekend list and chart reviewed, noting multiple Ethics Consultations re: comfort cares vs restorative measures and \"informed nondissent approach\" to lessen the burden of caregiver decisionmaking.    BANDAR noted, per chart review, [facility] to meet with pt's daughter today at 1330.     @1545- BANDAR called by bedside nurse to pt's bedside.  SW and bedside nurse met with pt's son, Hemanth, who was expressing concerns that information is no longer being shared with him from his sister.  Hemanth expressing that he is concerned that his cousin, Aranza, called him crying, as she came to visit pt and was reportedly told by the  that she is not allowed to visit.      BANDAR then received a call from RNCC Malia who shared pt's daughter, Joy, had called and told RNCC that she wanted to make patient confidential, she is the sole decision maker and that she does not want information shared with her " "brother, Hemanth, as he is \"sharing information.\"  SW reviewed pt's chart and noted that there is not valid or active health care directive on file and patient is not listed as confidential.     SW returned to speak with Hemanth and bedside nurse.  Nurse had paged Gold 2 Provider, ARIELLA Winston, requesting she call pt's son.  Son informed that provider will call him in 20 minutes and was grateful.  Son stated he does not want to cause problems but wants to know what is going on with his mom.  SW provided Hemanth with Unit 7C charge number and SW number to call.  Hemanth expressed gratitude and said he would be going over to his sister's house to talk with her.     @1620- SW messaged Provider and bedside nurse regarding next steps.    Per 3/26 Ethics Consult, pt's daughter, Aranza, has been operating as pt's main decision maker, however, it was suggested that it would be \"ethically appropriate\" to involve more family members in decision making.     Next Steps:   -Unit SW will follow up with family tomorrow.    - SW will follow up with [facility] to determine if they still plan to meet with pt and family, as this does not appear to have occurred today.   __________________________     WILLIAM Coffey, ALEKS  , [facility]  7C Medical Surgical Beds [id number]   Desk Phone: [phone]   Securely message with [employer] (Search Name or 7C Med Surg [id number] SW)  Text page via [employer] Paging/Directory   See signed in provider for up to date coverage information  Social Work & Care Management Department  ___________________________________    Unit 7C Care Management Weekend Contacts:    Searchable in [employer] - search SOCIAL WORK or CARE COORDINATOR  Searchable in [employer] - search 7C Med Surg SW, 7C Med Surg RNCC     7C Weekend SW Vocera; ([phone])  7C Weekend RNCC Vocera; ([phone])  After hours  Vocera;  (Weekends (1630 - 0000); Mon-Fri ([phone]); [facility] Recognized " Holidays  ([id number]))

## 2025-03-29 NOTE — PLAN OF CARE
Goal Outcome Evaluation:    Pt is alert and soft spoken. C/o generalized pain and pain in legs, pain managed with Fentanyl PCA. Repositioning Q2. Legs continue to drain, absorbent pad changed underneath legs. Pt is NPO. Family at bedside for beginning of shift.

## 2025-03-30 PROCEDURE — 120N000002 HC R&B MED SURG/OB UMMC

## 2025-03-30 PROCEDURE — 250N000011 HC RX IP 250 OP 636

## 2025-03-30 PROCEDURE — 99233 SBSQ HOSP IP/OBS HIGH 50: CPT | Performed by: PHYSICIAN ASSISTANT

## 2025-03-30 RX ADMIN — Medication 200 MCG/HR: at 10:28

## 2025-03-30 RX ADMIN — LORAZEPAM 1 MG: 2 INJECTION INTRAMUSCULAR; INTRAVENOUS at 16:47

## 2025-03-30 RX ADMIN — LORAZEPAM 1 MG: 2 INJECTION INTRAMUSCULAR; INTRAVENOUS at 11:56

## 2025-03-30 ASSESSMENT — ACTIVITIES OF DAILY LIVING (ADL)
ADLS_ACUITY_SCORE: 92
ADLS_ACUITY_SCORE: 96
ADLS_ACUITY_SCORE: 95
ADLS_ACUITY_SCORE: 96

## 2025-03-30 NOTE — PROGRESS NOTES
[facility]    Medicine Progress Note - Hospitalist Service, GOLD TEAM 2    Date of Admission:  3/4/2025    Assessment & Plan   Alis Hartman is a 69 year old female with PMH of cervical cancer s/p radiation, serous endometrial adenocarcinoma s/p SNEHA/BSO (hysterectomy/ovarian removal) and cystotomy w/ suprapubic catheter placement (7/2024) as well as several cycles of carbo/taxel (10/2024), hx ESBL urosepsis and bacteremia, RNY gastric bypass, afib not on apixaban (stopped due to vaginal bleeding), HTN, CKD3, who was initially admitted to [facility] for further evaluation and treatment of increased confusion, poor appetite and generalized weakness.  Hospital course has been complicated by MADHU, hypervolemia, acute on chronic pain, suspected dementia, and decreased oral intake. Transferred to the ICU 3/20 due to respiratory decline. Was intubated and had PEA arrest x2 following intubation. There was also concern for gastric perforation. Transitioned to comfort cares 3/27. Medicine assumed cares as primary 3/28.      Comfort Cares: Prior to intubation and ICU transfer, there were plans to discharge home on hospice on 3/20. After long discussion with ICU team and palliative care, patient transitioned to full comfort cares on 3/27. Transferred to medicine- still awake and breathing comfortably on room air.    - [facility] consult - will be completing assessment today   - Currently she would be medically stable for transport to home or facility pending logistics of Fentanyl infusion.    - Continue supportive care    - Artificial saliva QID   - Butrans patch in place (applied weekly)   - Robinul PRN   - Fentanyl gtt   - Zofran PRN             Diet: Regular Diet Adult    DVT Prophylaxis: N/A  Shahid Catheter: Not present  Lines: PRESENT             Cardiac Monitoring: None  Code Status: No CPR- Do NOT Intubate      Clinically Significant Risk Factors               #  Hypoalbuminemia: Lowest albumin = 1.5 g/dL at 3/21/2025 12:10 AM, will monitor as appropriate     # Hypertension: Noted on problem list             # Severe Malnutrition: based on nutrition assessment and treatment provided per dietitian's recommendations.    # Financial/Environmental Concerns: none         Social Drivers of Health    Food Insecurity: Unknown (3/12/2025)    Food Insecurity     Within the past 12 months, did you worry that your food would run out before you got money to buy more?: Patient unable to answer     Within the past 12 months, did the food you bought just not last and you didn t have money to get more?: Patient unable to answer   Housing Stability: Unknown (3/12/2025)    Housing Stability     Do you have housing? : Patient unable to answer     Are you worried about losing your housing?: Patient unable to answer   Financial Resource Strain: Unknown (3/12/2025)    Financial Resource Strain     Within the past 12 months, have you or your family members you live with been unable to get utilities (heat, electricity) when it was really needed?: Patient unable to answer   Transportation Needs: Unknown (3/12/2025)    Transportation Needs     Within the past 12 months, has lack of transportation kept you from medical appointments, getting your medicines, non-medical meetings or appointments, work, or from getting things that you need?: Patient unable to answer   Interpersonal Safety: Unknown (3/12/2025)    Interpersonal Safety     Do you feel physically and emotionally safe where you currently live?: Patient unable to answer     Within the past 12 months, have you been hit, slapped, kicked or otherwise physically hurt by someone?: Patient unable to answer     Within the past 12 months, have you been humiliated or emotionally abused in other ways by your partner or ex-partner?: Patient unable to answer          Disposition Plan     Medically Ready for Discharge: Anticipated in 2-4 Days           The  patient's care was discussed with the Attending Physician, Dr. Leyva and Bedside Nurse.    Jacquelin Winston PA-C  Hospitalist Service, [facility] TEAM [address] [place] [address]  Securely message with [employer] (more info)  Text page via [employer] Paging/Directory   See signed in provider for up to date coverage information  ______________________________________________________________________    Interval History   Comfortable on fentanyl infusion. Family visited yesterday.    Physical Exam   Vital Signs:                    Weight: 136 lbs 14.49 oz    Sleeping comfortably    Medical Decision Making       55 MINUTES SPENT BY ME on the date of service doing chart review, history, exam, documentation & further activities per the note.      Data   ------------------------- PAST 24 HR DATA REVIEWED -----------------------------------------------        Imaging results reviewed over the past 24 hrs:   No results found for this or any previous visit (from the past 24 hours).

## 2025-03-30 NOTE — PLAN OF CARE
Goal Outcome Evaluation:       Shift Hours: 0700 - 1900    Assessment:  Body systems assessments were at patient's baseline.        Activity     Fall Risk Score: 50   Bed alarm on? Yes     Activity Assistance Provided: assistance, 2 people      Assistive Device Utilized: lift device    Pain:  lower body butttocks     Labs/RN Managed Protocols:     Lines/Drains: piv left     Nutrition: reg    Goal Outcome Evaluation      Patient received ativan twice today once for air hunger and other restless. Daughter here to see hospice meeting and not ready to list for hospice not here for long. Son came and saw mom for a couple minutes and had to go home due to the snow no other family members here today. No urine out of suprapubic today. RR 12 continue to keep comfortable        Barriers to Discharge:    Barriers to discharge is facility or home with hospice

## 2025-03-30 NOTE — PLAN OF CARE
Goal Outcome Evaluation:    Pt resting comfortably throughout shift. Pain managed with Fentanyl PCA. Turning/repositioning Q2. Suprapubic catheter in place, minimal output. Loose, watery green stool overnight.

## 2025-03-30 NOTE — PLAN OF CARE
Goal Outcome Evaluation:         Patient comfortable.  Turn every two hours. Complete bed change and given bed bath. Afternoon was restless and asking to go home administered ativan that help calm down and put on music. Spoke with son josé and daughter jalyn who have different opinions. Daughter agreed that family members should be allowed to see her and she just wants mom to be comfortable . Daughter expressed that some family members could be loud and does not want mom to be upset. Educated both children that we are here to make her mom comfortable and if someone was making her uncomfortable or being loud we would ask the family member to leave. Daughter agreed to this. Educated son josé if he wanted his mother medical records he has to go through medical records. Educated both children they can call anytime to check on her.

## 2025-03-31 PROCEDURE — 250N000013 HC RX MED GY IP 250 OP 250 PS 637

## 2025-03-31 PROCEDURE — 99207 PR APP CREDIT; MD BILLING SHARED VISIT: CPT | Performed by: PHYSICIAN ASSISTANT

## 2025-03-31 PROCEDURE — 120N000002 HC R&B MED SURG/OB UMMC

## 2025-03-31 PROCEDURE — 99231 SBSQ HOSP IP/OBS SF/LOW 25: CPT | Mod: FS | Performed by: INTERNAL MEDICINE

## 2025-03-31 PROCEDURE — 250N000011 HC RX IP 250 OP 636

## 2025-03-31 PROCEDURE — 250N000013 HC RX MED GY IP 250 OP 250 PS 637: Performed by: PHYSICIAN ASSISTANT

## 2025-03-31 RX ADMIN — BUPRENORPHINE 1 PATCH: 20 PATCH, EXTENDED RELEASE TRANSDERMAL at 18:43

## 2025-03-31 RX ADMIN — LORAZEPAM 1 MG: 1 TABLET ORAL at 01:03

## 2025-03-31 RX ADMIN — Medication 200 MCG/HR: at 11:54

## 2025-03-31 RX ADMIN — Medication 200 MCG/HR: at 00:53

## 2025-03-31 RX ADMIN — Medication 200 MCG/HR: at 22:08

## 2025-03-31 RX ADMIN — LORAZEPAM 1 MG: 2 INJECTION INTRAMUSCULAR; INTRAVENOUS at 18:42

## 2025-03-31 ASSESSMENT — ACTIVITIES OF DAILY LIVING (ADL)
ADLS_ACUITY_SCORE: 96

## 2025-03-31 NOTE — PLAN OF CARE
Goal Outcome Evaluation:    Assumed cares from [id number]. Pt resting comfortably. Pain is being managed with Fentanyl PCA. Turning/repositioning completed. Suprapubic catheter in place.

## 2025-03-31 NOTE — PLAN OF CARE
Goal Outcome Evaluation:    Pt continues on comfort measures with shallow respirations. Repositioned for comfort, cleaned, and changed, exudry applied. Ativan given x 1. Left PIV. Pain is being managed with Fentanyl drip via PCA pump @200mcg/hr. Suprapubic catheter in place with minimal output. Continue with comfort measures.

## 2025-03-31 NOTE — PROGRESS NOTES
[facility]    Consult Note - [facility]  _________________________________________________________________    [facility]/7 Contact Number: [phone]    - Providers: Please contact [facility] with changes in orders or clinical plan of care   - Nursing: Please contact [facility] with significant changes in patient condition    Hospice will notify the care team (including the hospitalist) to confirm date of inpatient hospice (GIP) admission.    New Epic encounter will not be created until hospice completes admission.   ______________________________________________________________________        Summary of Visit (includes assessment, medications and any new orders):   Hospice RN attempted multiple times to make contact with patient's daughter, Ti, regarding signing patient up for hospice. The attempts were unsuccessful and hospice RN will attempt again tomorrow. Provider notified.      Abel Jackson RN

## 2025-03-31 NOTE — PROGRESS NOTES
[facility]    Medicine Progress Note - Hospitalist Service, GOLD TEAM 2    Date of Admission:  3/4/2025    Assessment & Plan   Alis Hartman is a 69 year old female with PMH of cervical cancer s/p radiation, serous endometrial adenocarcinoma s/p SNEHA/BSO (hysterectomy/ovarian removal) and cystotomy w/ suprapubic catheter placement (7/2024) as well as several cycles of carbo/taxel (10/2024), hx ESBL urosepsis and bacteremia, RNY gastric bypass, afib not on apixaban (stopped due to vaginal bleeding), HTN, CKD3, who was initially admitted to [facility] for further evaluation and treatment of increased confusion, poor appetite and generalized weakness.  Hospital course has been complicated by MADHU, hypervolemia, acute on chronic pain, suspected dementia, and decreased oral intake. Transferred to the ICU 3/20 due to respiratory decline. Was intubated and had PEA arrest x2 following intubation. There was also concern for gastric perforation. Transitioned to comfort cares 3/27. Medicine assumed cares as primary 3/28.      Comfort Cares: Prior to intubation and ICU transfer, there were plans to discharge home on hospice on 3/20. After long discussion with ICU team and palliative care, patient transitioned to full comfort cares on 3/27. She is much more somnolent today.   - [facility] consult - meeting with family today   - Continue supportive care    - Artificial saliva QID   - Butrans patch in place (applied weekly)   - Robinul PRN   - Fentanyl gtt   - Zofran PRN          Diet: Regular Diet Adult    DVT Prophylaxis: N/A  Shahid Catheter: Not present  Lines: PRESENT             Cardiac Monitoring: None  Code Status: No CPR- Do NOT Intubate      Clinically Significant Risk Factors               # Hypoalbuminemia: Lowest albumin = 1.5 g/dL at 3/21/2025 12:10 AM, will monitor as appropriate     # Hypertension: Noted on problem list             # Severe Malnutrition: based on  nutrition assessment and treatment provided per dietitian's recommendations.    # Financial/Environmental Concerns: none         Social Drivers of Health    Food Insecurity: Unknown (3/12/2025)    Food Insecurity     Within the past 12 months, did you worry that your food would run out before you got money to buy more?: Patient unable to answer     Within the past 12 months, did the food you bought just not last and you didn t have money to get more?: Patient unable to answer   Housing Stability: Unknown (3/12/2025)    Housing Stability     Do you have housing? : Patient unable to answer     Are you worried about losing your housing?: Patient unable to answer   Financial Resource Strain: Unknown (3/12/2025)    Financial Resource Strain     Within the past 12 months, have you or your family members you live with been unable to get utilities (heat, electricity) when it was really needed?: Patient unable to answer   Transportation Needs: Unknown (3/12/2025)    Transportation Needs     Within the past 12 months, has lack of transportation kept you from medical appointments, getting your medicines, non-medical meetings or appointments, work, or from getting things that you need?: Patient unable to answer   Interpersonal Safety: Unknown (3/12/2025)    Interpersonal Safety     Do you feel physically and emotionally safe where you currently live?: Patient unable to answer     Within the past 12 months, have you been hit, slapped, kicked or otherwise physically hurt by someone?: Patient unable to answer     Within the past 12 months, have you been humiliated or emotionally abused in other ways by your partner or ex-partner?: Patient unable to answer          Disposition Plan     Medically Ready for Discharge: Anticipated in 2-4 Days           The patient's care was discussed with the Attending Physician, Dr. Leyva and Patient.    Jacquelin Winston PA-C  Hospitalist Service, GOLD TEAM 2  [facility]  [facility]  Securely message with [employer] (more info)  Text page via [employer] Paging/Directory   See signed in provider for up to date coverage information  ______________________________________________________________________    Interval History   Less responsive today.     Physical Exam   Vital Signs:                    Weight: 136 lbs 14.49 oz    Somnolent, does not respond to voice. Extremities are cool.    Medical Decision Making       55 MINUTES SPENT BY ME on the date of service doing chart review, history, exam, documentation & further activities per the note.      Data   ------------------------- PAST 24 HR DATA REVIEWED -----------------------------------------------        Imaging results reviewed over the past 24 hrs:   No results found for this or any previous visit (from the past 24 hours).

## 2025-03-31 NOTE — PLAN OF CARE
Pt continues comfort care measures. Repositioned for comfort. PRN ativan given x1. L PIV infusing fentanyl gtt via PCA pump @200 mcg/hr. Suprapubic cath in place with minimal output. Continue with POC

## 2025-04-01 PROCEDURE — 250N000011 HC RX IP 250 OP 636

## 2025-04-01 PROCEDURE — 99233 SBSQ HOSP IP/OBS HIGH 50: CPT | Performed by: PHYSICIAN ASSISTANT

## 2025-04-01 PROCEDURE — 99207 PR NO BILLABLE SERVICE THIS VISIT: CPT | Performed by: INTERNAL MEDICINE

## 2025-04-01 PROCEDURE — 120N000002 HC R&B MED SURG/OB UMMC

## 2025-04-01 RX ADMIN — LORAZEPAM 1 MG: 2 INJECTION INTRAMUSCULAR; INTRAVENOUS at 17:55

## 2025-04-01 RX ADMIN — Medication 200 MCG/HR: at 22:52

## 2025-04-01 RX ADMIN — Medication 200 MCG/HR: at 09:45

## 2025-04-01 RX ADMIN — LORAZEPAM 1 MG: 2 INJECTION INTRAMUSCULAR; INTRAVENOUS at 05:46

## 2025-04-01 ASSESSMENT — ACTIVITIES OF DAILY LIVING (ADL)
ADLS_ACUITY_SCORE: 96

## 2025-04-01 NOTE — PROGRESS NOTES
Care Management Follow Up    Length of Stay (days): 28    Expected Discharge Date: 04/02/2025     Concerns to be Addressed: discharge planning     Patient plan of care discussed at interdisciplinary rounds: Yes    Anticipated Discharge Disposition:  (TBD)  Anticipated Discharge Services:  (TBD)  Anticipated Discharge DME: None    Patient/family educated on Medicare website which has current facility and service quality ratings: no  Education Provided on the Discharge Plan: No  Patient/Family in Agreement with the Plan: unable to assess    Referrals Placed by CM/SW:    Private pay costs discussed: Not applicable    Discussed  Partnership in Safe Discharge Planning  document with patient/family: No     Handoff Completed: No, handoff not indicated or clinically appropriate    Additional Information:  Jacquelin Winston PA-C requested SW arrange a care conference with the medicine team, [facility], and the patient's family to further discuss hospice and the plan. Jacquelin reported the patient's daughter Joy has failed to engage with the hospice team for the last 4-5 days.    BANDAR spoke with Iza Leach with [facility] who confirmed they can attend a CC tomorrow 4/2/25 @1300    @1053 BANDAR called the patient's daughter Joy and confirmed she can attend CC tomorrow. Joy reported she will attend over the phone. She stated she will be visiting the patient shortly and is agreeable to SW writing down the call in information and leaving it in the patient's room.  BANDAR wrote down call in information for CC and taped it on the patient's bedside table    @1135 BANDAR called the patient's son Hemanth and he confirmed he can attend CC tomorrow over the phone @1300. BANDAR provided Hemanth the call in information.     BANDAR updated the primary care team    Care conference scheduled for Wednesday 4/2/25 @1300  Call in information   Phone #: [phone]  Conference ID: [id number]    Next Steps: BANDAR will follow for discharge  planning and discharge needs.     BRIANNA Forbes  [address]   Office: [phone]  [email or web address]  Securely message with [employer] (Search Name or  Med Surg [phone] )  Text page via [facility] Paging/Directory   See signed in provider for up to date coverage information  Social Work & Care Management Department

## 2025-04-01 NOTE — PLAN OF CARE
Goal Outcome Evaluation:         Shift Hours: 0700 - 1900    Assessment:  Body systems assessments were at patient's baseline.        Activity     Fall Risk Score: 50   Bed alarm on? Yes     Activity Assistance Provided: assistance, 2 people      Assistive Device Utilized: lift device    Pain: comfortable fentalyl pca     Labs/RN Managed Protocols:    Lines/Drains: port    Nutrition: regular    Goal Outcome Evaluation      Patient comfortable. Ativan given this afternoon for air hunger.        Barriers to Discharge:   Plan is for family to have conference tomorrow afternoon

## 2025-04-01 NOTE — PROGRESS NOTES
[facility]    Medicine Progress Note - Hospitalist Service, GOLD TEAM 2    Date of Admission:  3/4/2025    Assessment & Plan   Alis Hartman is a 69 year old female with PMH of cervical cancer s/p radiation, serous endometrial adenocarcinoma s/p SNEHA/BSO (hysterectomy/ovarian removal) and cystotomy w/ suprapubic catheter placement (7/2024) as well as several cycles of carbo/taxel (10/2024), hx ESBL urosepsis and bacteremia, RNY gastric bypass, afib not on apixaban (stopped due to vaginal bleeding), HTN, CKD3, who was initially admitted to [facility] for further evaluation and treatment of increased confusion, poor appetite and generalized weakness.  Hospital course has been complicated by MADHU, hypervolemia, acute on chronic pain, suspected dementia, and decreased oral intake. Transferred to the ICU 3/20 due to respiratory decline. Was intubated and had PEA arrest x2 following intubation. There was also concern for gastric perforation. Transitioned to comfort cares 3/27. Medicine assumed cares as primary 3/28.      Comfort Cares: Prior to intubation and ICU transfer, there were plans to discharge home on hospice on 3/20. After long discussion with ICU team and palliative care, patient transitioned to full comfort cares on 3/27. She is much more somnolent today.   - [facility] consulted, family has been difficult to engage, not yet enrolled in [facility] hosp[facility]   - Will arrange for care conference with our team, hospice, and family this week    - Continue supportive care    - Artificial saliva QID   - Butrans patch in place (applied weekly)   - Robinul PRN   - Fentanyl gtt   - Zofran PRN          Diet: Regular Diet Adult    DVT Prophylaxis: N/A  Shahid Catheter: Not present  Lines: PRESENT      Port a Cath 02/05/25 Single Lumen Right Chest wall-Site Assessment: WDL      Cardiac Monitoring: None  Code Status: No CPR- Do NOT Intubate      Clinically Significant Risk  Factors               # Hypoalbuminemia: Lowest albumin = 1.5 g/dL at 3/21/2025 12:10 AM, will monitor as appropriate     # Hypertension: Noted on problem list             # Severe Malnutrition: based on nutrition assessment and treatment provided per dietitian's recommendations.    # Financial/Environmental Concerns: none         Social Drivers of Health    Food Insecurity: Unknown (3/12/2025)    Food Insecurity     Within the past 12 months, did you worry that your food would run out before you got money to buy more?: Patient unable to answer     Within the past 12 months, did the food you bought just not last and you didn t have money to get more?: Patient unable to answer   Housing Stability: Unknown (3/12/2025)    Housing Stability     Do you have housing? : Patient unable to answer     Are you worried about losing your housing?: Patient unable to answer   Financial Resource Strain: Unknown (3/12/2025)    Financial Resource Strain     Within the past 12 months, have you or your family members you live with been unable to get utilities (heat, electricity) when it was really needed?: Patient unable to answer   Transportation Needs: Unknown (3/12/2025)    Transportation Needs     Within the past 12 months, has lack of transportation kept you from medical appointments, getting your medicines, non-medical meetings or appointments, work, or from getting things that you need?: Patient unable to answer   Interpersonal Safety: Unknown (3/12/2025)    Interpersonal Safety     Do you feel physically and emotionally safe where you currently live?: Patient unable to answer     Within the past 12 months, have you been hit, slapped, kicked or otherwise physically hurt by someone?: Patient unable to answer     Within the past 12 months, have you been humiliated or emotionally abused in other ways by your partner or ex-partner?: Patient unable to answer          Disposition Plan     Medically Ready for Discharge: Anticipated in  2-4 Days Patient will remained hospitalized through end of life.            The patient's care was discussed with the Attending Physician, Dr. Payne, Bedside Nurse, and Patient.    Jacquelin Winston PA-C  Hospitalist Service, [address]  Securely message with [employer] (more info)  Text page via [facility] Paging/Directory   See signed in provider for up to date coverage information  ______________________________________________________________________    Interval History   No acute events overnight. Comfortable.     Physical Exam   Vital Signs:                    Weight: 136 lbs 14.49 oz    Somnolent, breathing slowing down, extremities are cool    Medical Decision Making       55 MINUTES SPENT BY ME on the date of service doing chart review, history, exam, documentation & further activities per the note.      Data   ------------------------- PAST 24 HR DATA REVIEWED -----------------------------------------------        Imaging results reviewed over the past 24 hrs:   No results found for this or any previous visit (from the past 24 hours).

## 2025-04-01 NOTE — PLAN OF CARE
Shift Hours: 1900 - 0700    Patient on Comfort Care Measures    Assessment:  Body systems that were not at patient's baseline Cognitive/Behavioral/Neuro, Respiratory, Cardiac, Peripheral Neurovascular, Gastrointestinal, Genitourinary, Skin, Musculoskeletal, and Safety. Focused body system assessments documented in flowsheets.        Activity     Fall Risk Score: 50   Bed alarm on? Yes     Activity Assistance Provided: assistance, 2 people      Assistive Device Utilized: lift device    Pain: Fentanyl Gtt 200mcg/hr      Lines/Drains: PIV, Chest port    Nutrition: Regular, no intake    Goal Outcome Evaluation     Patient somulent this shift. Fentanyl gtt continued. Repositioned as allowed for optimal comfort. No signs of death rattle this shift. RR continue to decline. No output from suprapubic, incontinent of urine and bowel. Lq black and red BM. Skin cool at extremities. No response to any stimuli. Oral cares completed/assessed Q2. Rounded frequently. CPOC and comfort cares.   Barriers to Discharge:     Hospice coordination

## 2025-04-02 ENCOUNTER — PATIENT OUTREACH (OUTPATIENT)
Dept: ONCOLOGY | Facility: CLINIC | Age: 70
End: 2025-04-02
Payer: MEDICAID

## 2025-04-02 PROCEDURE — 99418 PROLNG IP/OBS E/M EA 15 MIN: CPT | Mod: FS | Performed by: PHYSICIAN ASSISTANT

## 2025-04-02 PROCEDURE — 120N000002 HC R&B MED SURG/OB UMMC

## 2025-04-02 PROCEDURE — 99418 PROLNG IP/OBS E/M EA 15 MIN: CPT | Mod: FS | Performed by: INTERNAL MEDICINE

## 2025-04-02 PROCEDURE — 99233 SBSQ HOSP IP/OBS HIGH 50: CPT | Mod: FS | Performed by: PHYSICIAN ASSISTANT

## 2025-04-02 PROCEDURE — 99233 SBSQ HOSP IP/OBS HIGH 50: CPT | Mod: FS | Performed by: INTERNAL MEDICINE

## 2025-04-02 PROCEDURE — 250N000011 HC RX IP 250 OP 636

## 2025-04-02 RX ADMIN — LORAZEPAM 1 MG: 2 INJECTION INTRAMUSCULAR; INTRAVENOUS at 09:23

## 2025-04-02 RX ADMIN — Medication 200 MCG/HR: at 20:25

## 2025-04-02 RX ADMIN — Medication 200 MCG/HR: at 09:23

## 2025-04-02 RX ADMIN — LORAZEPAM 1 MG: 2 INJECTION INTRAMUSCULAR; INTRAVENOUS at 14:37

## 2025-04-02 ASSESSMENT — ACTIVITIES OF DAILY LIVING (ADL)
ADLS_ACUITY_SCORE: 96

## 2025-04-02 NOTE — PLAN OF CARE
Goal Outcome Evaluation:         Shift Hours: 0700 - 1500    Assessment:  Body systems assessments were at patient's baseline.        Activity     Fall Risk Score: 50   Bed alarm on? Yes     Activity Assistance Provided: assistance, 2 people      Assistive Device Utilized: lift device    Pain: comfort       Lines/Drains: port left piv    Nutrition: regular    Goal Outcome Evaluation      Patient received ativan x2 for air hunger        Barriers to Discharge:    Family is deciding

## 2025-04-02 NOTE — PLAN OF CARE
Goal Outcome Evaluation:    Pt continues on comfort measures. Repositioned for comfort. Suprapubic catheter in place, no output. Continued pain management with Fentanyl drip via PCA @ 200 mcg/hr. L PIV and R Port a cath intact.

## 2025-04-02 NOTE — PROGRESS NOTES
[facility]    Medicine Progress Note - Hospitalist Service, GOLD TEAM 2    Date of Admission:  3/4/2025    Assessment & Plan   Alis Hartman is a 69 year old female with PMH of cervical cancer s/p radiation, serous endometrial adenocarcinoma s/p SNEHA/BSO (hysterectomy/ovarian removal) and cystotomy w/ suprapubic catheter placement (7/2024) as well as several cycles of carbo/taxel (10/2024), hx ESBL urosepsis and bacteremia, RNY gastric bypass, afib not on apixaban (stopped due to vaginal bleeding), HTN, CKD3, who was initially admitted to [facility] for further evaluation and treatment of increased confusion, poor appetite and generalized weakness.  Hospital course has been complicated by MADHU, hypervolemia, acute on chronic pain, suspected dementia, and decreased oral intake. Transferred to the ICU 3/20 due to respiratory decline. Was intubated and had PEA arrest x2 following intubation. There was also concern for gastric perforation. Transitioned to comfort cares 3/27. Medicine assumed cares as primary 3/28.      Comfort Cares: Prior to intubation and ICU transfer, there were plans to discharge home on hospice on 3/20. After long discussion with ICU team and palliative care, patient transitioned to full comfort cares on 3/27. She is much more somnolent today.   - [facility] consulted, family has been difficult to engage, not yet enrolled in [facility] hospice   - Care conference today with myself, [facility], daughter Joy, and son Hemanth. Hemanth is struggling with acceptance of his mom's current condition. Daughter is feeling overwhelmed with frequent communication from hospital staff. Family has not yet decided on hospice enrollment, they would like some space at this time and will reach out if they would like to pursue hospice.   - Continue supportive care    - Artificial saliva QID   - Butrans patch in place (applied weekly)   - Robinul PRN   - Fentanyl gtt   -  Zofran PRN          Diet: Regular Diet Adult    DVT Prophylaxis: N/A  Shahid Catheter: Not present  Lines: PRESENT      Port a Cath 02/05/25 Single Lumen Right Chest wall-Site Assessment: WDL      Cardiac Monitoring: None  Code Status: No CPR- Do NOT Intubate      Clinically Significant Risk Factors               # Hypoalbuminemia: Lowest albumin = 1.5 g/dL at 3/21/2025 12:10 AM, will monitor as appropriate     # Hypertension: Noted on problem list             # Severe Malnutrition: based on nutrition assessment and treatment provided per dietitian's recommendations.    # Financial/Environmental Concerns: none         Social Drivers of Health    Food Insecurity: Unknown (3/12/2025)    Food Insecurity     Within the past 12 months, did you worry that your food would run out before you got money to buy more?: Patient unable to answer     Within the past 12 months, did the food you bought just not last and you didn t have money to get more?: Patient unable to answer   Housing Stability: Unknown (3/12/2025)    Housing Stability     Do you have housing? : Patient unable to answer     Are you worried about losing your housing?: Patient unable to answer   Financial Resource Strain: Unknown (3/12/2025)    Financial Resource Strain     Within the past 12 months, have you or your family members you live with been unable to get utilities (heat, electricity) when it was really needed?: Patient unable to answer   Transportation Needs: Unknown (3/12/2025)    Transportation Needs     Within the past 12 months, has lack of transportation kept you from medical appointments, getting your medicines, non-medical meetings or appointments, work, or from getting things that you need?: Patient unable to answer   Interpersonal Safety: Unknown (3/12/2025)    Interpersonal Safety     Do you feel physically and emotionally safe where you currently live?: Patient unable to answer     Within the past 12 months, have you been hit, slapped, kicked  or otherwise physically hurt by someone?: Patient unable to answer     Within the past 12 months, have you been humiliated or emotionally abused in other ways by your partner or ex-partner?: Patient unable to answer          Disposition Plan     Medically Ready for Discharge: Anticipated in 5+ Days At this time anticipating she will remain hospitalized through end of life           The patient's care was discussed with the Attending Physician, Dr. Payne, Bedside Nurse, Patient, and Patient's Family.    Jacquelin Winston PA-C  Hospitalist Service, [address]  Securely message with [employer] (more info)  Text page via [facility] Paging/Directory   See signed in provider for up to date coverage information  ______________________________________________________________________    Interval History   Comfortable overnight. Somnolent. Urine output declined significantly.     Physical Exam   Vital Signs:                    Weight: 136 lbs 14.49 oz    Somnolent. Extremities cool.    Medical Decision Making       55 MINUTES SPENT BY ME on the date of service doing chart review, history, exam, documentation & further activities per the note.      Data   ------------------------- PAST 24 HR DATA REVIEWED -----------------------------------------------        Imaging results reviewed over the past 24 hrs:   No results found for this or any previous visit (from the past 24 hours).

## 2025-04-02 NOTE — PROGRESS NOTES
"[facility]: Cancer Care Note                                                          Received telephone call this afternoon from patient's daughter, Joy, calling to request assistance from Dr. Perez regarding patient's social security payments.      Per daughter, patient did not receive her social security check recently, so she contacted the social security office - and was informed that someone had marked the patient as \"\" in their system.  Daughter verbalized frustration over this since her mom is not , and is confused as to how this could happen.  She also states that patient's rent is due soon, and daughter will not be able to afford this - so she was counting on the social security money to help cover this.      Daughter was in the social security office this afternoon, and had RN on the phone during her appointment.  It was decided that the office would schedule a video appointment (with both patient and daughter present) for tomorrow afternoon to try and get things sorted out.    Daughter asked writer to send a message to Dr. Perez to inform her of what has happened, in case the social security office needs a letter or some type of documentation from us.  Daughter also states she was told that a  would be contacting her soon regarding financial concerns, but she hasn't heard anything yet and wanted to follow up on this.  Note routed to Dr. Perez and Oncology Social Worker this afternoon, per daughter's request.      Nicholas Jarrell, RN, BSN, OCN  RN Care Coordinator - Oncology  [facility]    "

## 2025-04-03 PROCEDURE — 99207 PR NO BILLABLE SERVICE THIS VISIT: CPT | Performed by: INTERNAL MEDICINE

## 2025-04-03 PROCEDURE — 250N000013 HC RX MED GY IP 250 OP 250 PS 637: Performed by: INTERNAL MEDICINE

## 2025-04-03 PROCEDURE — 99233 SBSQ HOSP IP/OBS HIGH 50: CPT | Performed by: PHYSICIAN ASSISTANT

## 2025-04-03 PROCEDURE — 250N000011 HC RX IP 250 OP 636

## 2025-04-03 PROCEDURE — 120N000002 HC R&B MED SURG/OB UMMC

## 2025-04-03 PROCEDURE — G0463 HOSPITAL OUTPT CLINIC VISIT: HCPCS

## 2025-04-03 PROCEDURE — 99233 SBSQ HOSP IP/OBS HIGH 50: CPT | Performed by: CLINICAL NURSE SPECIALIST

## 2025-04-03 RX ADMIN — Medication 200 MCG/HR: at 19:30

## 2025-04-03 RX ADMIN — Medication 200 MCG/HR: at 09:01

## 2025-04-03 RX ADMIN — Medication 1 SPRAY: at 23:16

## 2025-04-03 ASSESSMENT — ACTIVITIES OF DAILY LIVING (ADL)
ADLS_ACUITY_SCORE: 96

## 2025-04-03 NOTE — PROGRESS NOTES
[facility]: Cancer Care Note                                                          Received voicemail message from patient's daughter, Joy, this afternoon, requesting to have a message passed on to Dr. Perez regarding the issues they are having with patient's social security benefits (see chart note from writer dated 4/2/25 for details).    Daughter is asking if Dr. Perez would be able to write a letter in patient's chart, advising that patient is still alive, and that she is under the care of Dr. Perez  / [facility].     Message sent to Dr. Perez this afternoon with daughter's request.    Nicholas Jarrell, RN, BSN, OCN  RN Care Coordinator - Oncology  [facility]

## 2025-04-03 NOTE — PROGRESS NOTES
[facility]    Medicine Progress Note - Hospitalist Service, GOLD TEAM 2    Date of Admission:  3/4/2025    Assessment & Plan   Alis Hartman is a 69 year old female with PMH of cervical cancer s/p radiation, serous endometrial adenocarcinoma s/p SNEHA/BSO (hysterectomy/ovarian removal) and cystotomy w/ suprapubic catheter placement (7/2024) as well as several cycles of carbo/taxel (10/2024), hx ESBL urosepsis and bacteremia, RNY gastric bypass, afib not on apixaban (stopped due to vaginal bleeding), HTN, CKD3, who was initially admitted to [facility] for further evaluation and treatment of increased confusion, poor appetite and generalized weakness.  Hospital course has been complicated by MADHU, hypervolemia, acute on chronic pain, suspected dementia, and decreased oral intake. Transferred to the ICU 3/20 due to respiratory decline. Was intubated and had PEA arrest x2 following intubation. There was also concern for gastric perforation. Transitioned to comfort cares 3/27. Medicine assumed cares as primary 3/28.      Comfort Cares: Prior to intubation and ICU transfer, there were plans to discharge home on hospice on 3/20. After long discussion with ICU team and palliative care, patient transitioned to full comfort cares on 3/27. She is much more somnolent today.   - Palliative Care will see patient today   - [facility] consulted, family will reach out if they wish to enroll   - Care conference on 4/2 with myself, [facility], children (Hemanth Hamilton). Family is feeling overwhelmed with frequent communication from hospital staff, they would like some space. We will plan to reach out to family if patient's condition changes, otherwise family will plan to contact us as needed.    - Continue supportive care   - Artificial saliva QID   - Butrans patch in place (applied weekly)   - Robinul PRN   - Fentanyl gtt   - Zofran PRN          Diet: Regular Diet Adult    DVT  Prophylaxis: N/A  Shahid Catheter: Not present  Lines: PRESENT      Port a Cath 02/05/25 Single Lumen Right Chest wall-Site Assessment: WDL      Cardiac Monitoring: None  Code Status: No CPR- Do NOT Intubate      Clinically Significant Risk Factors               # Hypoalbuminemia: Lowest albumin = 1.5 g/dL at 3/21/2025 12:10 AM, will monitor as appropriate     # Hypertension: Noted on problem list             # Severe Malnutrition: based on nutrition assessment and treatment provided per dietitian's recommendations.    # Financial/Environmental Concerns: none         Social Drivers of Health    Food Insecurity: Unknown (3/12/2025)    Food Insecurity     Within the past 12 months, did you worry that your food would run out before you got money to buy more?: Patient unable to answer     Within the past 12 months, did the food you bought just not last and you didn t have money to get more?: Patient unable to answer   Housing Stability: Unknown (3/12/2025)    Housing Stability     Do you have housing? : Patient unable to answer     Are you worried about losing your housing?: Patient unable to answer   Financial Resource Strain: Unknown (3/12/2025)    Financial Resource Strain     Within the past 12 months, have you or your family members you live with been unable to get utilities (heat, electricity) when it was really needed?: Patient unable to answer   Transportation Needs: Unknown (3/12/2025)    Transportation Needs     Within the past 12 months, has lack of transportation kept you from medical appointments, getting your medicines, non-medical meetings or appointments, work, or from getting things that you need?: Patient unable to answer   Interpersonal Safety: Unknown (3/12/2025)    Interpersonal Safety     Do you feel physically and emotionally safe where you currently live?: Patient unable to answer     Within the past 12 months, have you been hit, slapped, kicked or otherwise physically hurt by someone?: Patient  unable to answer     Within the past 12 months, have you been humiliated or emotionally abused in other ways by your partner or ex-partner?: Patient unable to answer          Disposition Plan     Medically Ready for Discharge: Anticipated in 5+ Days She will remain hospitalized through end of life.            The patient's care was discussed with the Attending Physician, Dr. Payne and Bedside Nurse.    Jacquelin Winston PA-C  Hospitalist Service, [facility] [place] TEAM [address] [place] [address]  Securely message with [employer] (more info)  Text page via [facility] Paging/Directory   See signed in provider for up to date coverage information  ______________________________________________________________________    Interval History   No acute events overnight. Sister and niece came to visit. Trace urine output. Seems comfortable.     Physical Exam   Vital Signs:                    Weight: 136 lbs 14.49 oz    Somnolent. Extremities cool. Appears comfortable.    Medical Decision Making       55 MINUTES SPENT BY ME on the date of service doing chart review, history, exam, documentation & further activities per the note.      Data   ------------------------- PAST 24 HR DATA REVIEWED -----------------------------------------------        Imaging results reviewed over the past 24 hrs:   No results found for this or any previous visit (from the past 24 hours).

## 2025-04-03 NOTE — PLAN OF CARE
Goal Outcome Evaluation:    Pt remains minimally responsive. rFLACC score 0, fentanyl PCA continues infusion @ 4 mL/h. Turning patient as tolerated, incontinence care provided x2. Shahid output negligible. No bowel movement on shift. Barrier cream applied to inner thighs, vaseline to lips. Continuing w/ comfort measures.

## 2025-04-03 NOTE — PLAN OF CARE
Goal Outcome Evaluation:    Shift Hours: 8816-0146    Comfort cares continues, shallow respirations with diminished lung sounds. Pt seemed calm throughout shift, music therapy provided. Skin is cool and pale, inner thigh and sacrum tends to be moist from incontinent status. Fentanyl gtt via PCA pump continues.     Student Nurse, Shantell Quevedo

## 2025-04-03 NOTE — PLAN OF CARE
Goal Outcome Evaluation:  Care from 3:30 pm to 7 pm  Pt is on comfort cares.  Pt is unresponsive. Breaths shallow and fast. PCA fentanyl is at 200 mcg/hr. Shahid with no output. Sister and niece were at bed side this afternoon.  Continue with POC

## 2025-04-03 NOTE — PROGRESS NOTES
PALLIATIVE CARE PROGRESS NOTE  [facility]     Patient Name: Alis Hartman  Date of Admission: 3/4/2025   Today the patient was seen for: goals of care and symptoms     Recommendations & Counseling       GOALS OF CARE:   Agree with DNR/DNI, continued comfort measures   Anticipate death in hospital and seems she is actively dying process where we expect about 0-2 days.    ADVANCE CARE PLANNING:  Previously completed HCD naming Joy (daughter) as HCA, appears this was not dated and deemed invalid. Joy has been serving as surrogate decision maker with input from her brother Hemanth (patient's son). Highly recommend both being included if there is a need for decision making. Additionally, in supporting them with decisions, an informed non-dissent approach is most appropriate. Also see Ethics note on 3/26.  Code status: No CPR- Do NOT Intubate    MEDICAL MANAGEMENT:   Comfort Care   #Pain  Continue fentanyl drip 200 mcg/hr  Would have PRN opioid available for breakthrough symptoms such as fentanyl -400 mcg q1h PRN.     #Air hunger/Dyspnea   Would use opioids above to also treat dyspnea    #Anxiety    Lorazepam IV 1 mg q 3 hour as needed    #Secretion burden   Robinul 0.1 mg (PO/IV) q 4 hours as needed. If ineffective, increase up to 0.3 mg   Consider atropine if Robinul ineffective after dose increase      #Nausea   Zofran 4 mg q 6 hours as needed. Can increase to 8 mg   Zyprexa 5 mg q 6 hours as needed     #Agitation  Aggressive control of other symptoms first, then  1st choice: Zyprexa 5 mg ODT or IM   2nd choice: Increase dose of Zyprexa to 10 mg or consider switch to Haldol   3rd choice: Lorazepam as above     PSYCHOSOCIAL/SPIRITUAL:  Children met with the team yesterday, having lots of distress. Placed  consult for support of family.    Palliative Care will continue to follow. Thank you for the consult and allowing us to aid in the care of  Alis Hartman.    These recommendations have been discussed with primary team.    ERINN Vela CNS  [facility], Palliative Care  Securely message with the [employer] Web [employer] (learn more here) or  Text page via [facility] Paging/Directory        Assessment          Alis Hartman is a 69 year old female with a past medical history of cervical cancer s/p radiation, seriuos adenocarcinoma s/p SNEHA/BSO and cystotomy w/ suprapubic catheter placement 07/2024 and 3 cycles of carbo/taxol (10/2024), hx ESBL urosepsis and bacteremia, RNY gastric bypass, afib on apixaban, HTN, CKD stage 3, and multiple recent admissions (most recently 2/5-2/25 with MADHU, deconditioning, and malnutrition), re-admitted 3/4 with concern for UTI and failure to thrive/general weakness. Palliative following for symptom management and goals of care.       Interval History:     Multidisciplinary collaboration:  Notes reviewed, the teams had a meeting with family yesterday in which family did not want to enroll her in hospice. At this time, daughter really doesn't want to be called on a regular basis. And son at the meeting requested a transfer to another hospital. However upon discussing the goal of comfort and that she is dying the children did not disagree with the current plan to keep her comfortable and allow a peaceful death in the hospital.    Notable medications:  Fentanyl drip 200 mcg/hr    Patient/family narrative  No family present.    Patient is somnolent and appears comfortable, no furrowing of her eyebrows and is not moving at all. Shallow breathing that is regular with intermittent big breaths. Cold extremities.     Review of Systems:     Besides above, ROS was reviewed and is unremarkable        Physical Exam:      136 lbs 14.49 oz    Constitutional: somnolent, no apparent distress, appears cachectic and is actively dying.  Lungs: No increased work of breathing, shallow breaths with an occasional deep  breath.  Musculoskeletal: cool extremities  Neurologic: somnolent  Skin: LE with some spoting purple areas, possible ecchymosis versus mottling.        Data Reviewed:     No results found for this or any previous visit (from the past 24 hours).  Recent Labs   Lab 03/27/25  1829 03/27/25  1730 03/27/25  1633   GLC 83 83 93       No results found for this or any previous visit (from the past 24 hours).      Medical Decision Making       55 MINUTES SPENT BY ME on the date of service doing chart review, history, exam, documentation & further activities per the note.

## 2025-04-03 NOTE — PLAN OF CARE
Goal Outcome Evaluation:         Patient comfortable.  Patient turned every two hours. Daughter called for update

## 2025-04-03 NOTE — PROGRESS NOTES
"[facility]  WO Nurse Inpatient Assessment     Consulted for: coccyx  3/7: New consult for wounds to bilateral legs  3/17: New consult for wound around SP cath  3/20: New consult for PI on buttocks  3/21: repeat consult for legs/labia after transfer to ICU, continue plan from yesterday below, [facility] will follow up next week   3/27: Per bedside RN, Pt condition declining rapidly. Unable to assess Pt at this time. Per RN wounds stable to deteriorating. Perineal area with very fragile skin.     Summary: known by WO from previous admission     WO nurse follow-up plan: weekly    Patient History (according to provider note(s):      The patient is a 69 year old female, with an extensive PMHx which is nicely documented in Dr. Khan and Mis's note from the ER.  Per their notes:  Ms. 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  The patient reportedly was feeling sick and her \"caretakers\" wanted her seen by a physician.  Apparently she has not been feeling well since her last discharge from the hospital with increased confusion, poor appetite and increased generalized weakness which now she is clearly confined to her bed because of this. Her urine output(UOP) has been foul smelling and lower amounts.  The patient refused to come to the ER 2 days ago.  The patient denies any other complaints but again has a difficult time offering a cogent history as to how she is feeling and what she's complaining of.  The remainder of the history of present illness is limited given the patient's altered mental status    Assessment:      Areas visualized during today's visit: Focused:, Lower extremities , and " Abdomen    Wound location: Lower abdomen around SP catheter      Last photo: 3/20  Wound due to: Friction and Moisture Associated Skin Damage (MASD)  Wound history/plan of care: Skin break down noted by bedside RN. Pt has chronic indwelling SP cath. Tube was exchanged at bedside by Urology 3/17  4/3: Pt on comfort cares. WOC only assessed anterior surfaces. Visible wounds stable.  Wound base: 100 % Erythema and superficial erosion     Palpation of the wound bed: normal      Drainage: none     Description of drainage: none     Measurements (length x width x depth, in cm): See photos     Tunneling: N/A     Undermining: N/A  Periwound skin: Superficial erosion      Color: pink      Temperature: normal   Odor: none  Pain: moderate, tender  Pain interventions prior to dressing change: slow and gentle cares   Treatment goal: Heal  and Protection  STATUS: stable  Supplies ordered: at bedside     Wound location: Bilateral legs     Left medial lower leg        Left posterior thigh      Right Calf        Right hip        Last photo: 3/20  Wound due to:  Edema blisters  Wound history/plan of care: Pt had large edema blisters on a previous admission that unroofed. Large wound on left medial leg 90% epithelialized.  3/20: Pt has a large amount of weeping edema from all open wounds.  4/3: Pt on comfort cares. WOC only assessed anterior surfaces. Visible wounds stable.  Wound base: 50 % Dermis, 50 % new pink Epidermis -     Palpation of the wound bed: normal      Drainage: small     Description of drainage: serous     Measurements (length x width x depth, in cm): See photos     Tunneling: N/A     Undermining: N/A  Periwound skin: Intact, Ecchymosis, and Edematous - there is purple mottling scattered around bilateral legs      Color: normal and consistent with surrounding tissue      Temperature: normal   Odor: none  Pain: moderate and during dressing change, aching  Pain interventions prior to dressing change: slow and gentle  cares   Treatment goal: Heal  and Protection  STATUS: stable  Supplies ordered: at bedside     Pressure Injury Location: Sacrum/coccyx                            2/18          Last photo: 3/20  Wound type: Pressure Injury, Shear, and Moisture Associated Skin Damage (MASD)     Pressure Injury Stage: site of previously healed pressure injury, present on admission     Wound history/plan of care:   unknown worst stage, currently appears to be a stage 2 but was possibly deeper. Was noted as a reinjury on her last assessment here 12/4/24 and 1/27/25  4/3: Pt on comfort cares. WOC only assessed anterior surfaces. Visible wounds stable.  Wound base: 80 % Fibrin, 20% dermis.      Palpation of the wound bed: normal      Drainage: small     Description of drainage: none     Measurements (length x width x depth, in cm) total area 7 x 4 x 0.1 cm   Periwound skin: Intact and Erythema- blanchable      Color: normal and consistent with surrounding tissue      Temperature: normal   Odor: none  Pain: moderate, tender  Pain intervention prior to dressing change: slow and gentle cares   Treatment goal: Heal  and Protection  STATUS: stable  Supplies ordered: discussed with RN and discussed with patient     Wound location: Yaa-anal area, inner thighs        Last photo: 3/20  Wound due to: Friction, Incontinence Associated Dermatitis (IAD), and Moisture Associated Skin Damage (MASD)  Wound history/plan of care: Pt has been non ambulatory. Per RN Pt had frequent loose stools overnight and has been incontinent of bowel this admission. Pt has a SP cath for urine. Wounds appear consistent with severe MASD. Pt has present on admission pressure injuries to sacrum/coccyx area. Pt also has weeping edema that is leaking out of exposed areas of dermis on inner thighs, exacerbating the moisture issue.   4/3: Pt on comfort cares. WOC only assessed anterior surfaces. Visible wounds stable.  Wound base: 80 % Dermis, 20 % Fibrin     Palpation of the  wound bed: normal      Drainage: copious     Description of drainage: serous     Measurements (length x width x depth, in cm): See photos     Tunneling: N/A     Undermining: N/A  Periwound skin: Edematous, Macerated, and Superficial erosion      Color: dusky, pink, and red      Temperature: normal   Odor: none  Pain: no grimacing or signs of discomfort, none  Pain interventions prior to dressing change: slow and gentle cares   Treatment goal: Decrease moisture and Protection  STATUS: initial assessment  Supplies ordered: at bedside and discussed with RN       Treatment Plan:     Inner thigh wounds: PRN, gently cleanse with saline and pat dry. Place one 9x11 Mextra super absorbent pad (#580089) against each right and left inner thigh. Change pad PRN for drainage.    Lower abdomen around SP cath: BID and as needed.   Cleanse the area with Marry cleanse and protect, very gently with soft cloth.  Apply ostomy powder (#116790) on all open and denuded skin.  Apply thin layer of Barrier paste (ex: Critic aid) on top of it.  With repeat application, do not scrub the paste, only remove soiled paste and reapply.  If complete removal of paste is necessary use baby oil/mineral oil (#415601) and soft wash cloth.    Sacrum/Buttocks/Yaa-anal area: BID and as needed.   Cleanse the area with Marry cleanse and protect, very gently with soft cloth.  Apply ostomy powder (#643685) on all open and denuded skin.  Apply thin layer of Barrier paste (ex: Critic aid) on top of it.  With repeat application, do not scrub the paste, only remove soiled paste and reapply.  If complete removal of paste is necessary use baby oil/mineral oil (#619366) and soft wash cloth.  Ensure pt has chair cushion (#824358) while sitting up in the chair.  Use only one blue pad in between mattress and pt. No brief in bed.      Bilateral lower legs wounds: Every other day: remove dressings and wash wounds with Vashe and gauze. Pat dry. Cover open areas with Vaseline  "gauze and secure with large Mepilex or ABD and stretch netting. If using Mepilex, note patient has very delicate skin so care should be taken to prevent worsening skin tears when removing.     RLE shin wound: PRN Place a 5x5 Mextra super absorbent pad (151578) on small open area on right shin. Change PRN for drainage.    Pressure Injury Prevention (PIP) Plan:  If patient is declining pressure injury prevention interventions: Explore reason why and address patient's concerns, Educate on pressure injury risk and prevention intervention(s), If patient is still declining, document \"informed refusal\" , and Ensure Care team is aware ( provider, charge nurse, etc)  Mattress: Follow bed algorithm, add Low Air Loss (Air+) mattress pump if skin is very moist or constantly moist.   HOB: Maintain at or below 30 degrees, unless contraindicated  Repositioning in bed: Every 1-2 hours , Left/right positioning; avoid supine, Raise foot of bed prior to raising head of bed, to reduce patient sliding down (shear), and Frequent microturns using positioning wedges, as patient tolerates  Heels: Pillows under calves  Protective Dressing: Sacral Mepilex for prevention (#512544),  especially for the agitated patient   Positioning Equipment:None  Chair positioning: Chair cushion (#544415) , Assist patient to reposition hourly, and Do NOT use a donut for sitting (this increases pressure to smaller area and creates a higher potential for injury)    If patient has a buttock pressure injury, or high risk for PI use chair cushion or SPS.  Moisture Management: Perineal cleansing /protection: Follow Incontinence Protocol, Avoid brief in bed, Clean and dry skin folds with bathing , Consider InterDry (#552560) between folds, and Moisturize dry skin  Under Devices: Inspect skin under all medical devices during skin inspection , Ensure tubes are stabilized without tension, and Ensure patient is not lying on medical devices or equipment when " "repositioned  Ask provider to discontinue device when no longer needed.     Orders: Reviewed    RECOMMEND PRIMARY TEAM ORDER: None, at this time  Education provided: importance of repositioning, plan of care, and wound progress  Discussed plan of care with: Patient and Nurse  Notify [facility] if wound(s) deteriorate.  Nursing to notify the Provider(s) and re-consult the [facility] Nurse if new skin concern.    DATA:     Current support surface: Standard  Standard gel mattress (Isoflex)  Containment of urine/stool: Incontinence Protocol and Indwelling catheter  BMI: Body mass index is 24.25 kg/m .   Active diet order: Orders Placed This Encounter      Regular Diet Adult     Output: No intake/output data recorded.     Labs:   No lab results found in last 7 days.    Invalid input(s): \"GLUCOMBO\"    Pressure injury risk assessment:   Sensory Perception: 1-->completely limited  Moisture: 1-->constantly moist  Activity: 1-->bedfast  Mobility: 1-->completely immobile  Nutrition: 1-->very poor  Friction and Shear: 1-->problem  Yogi Score: 6    Gaurang Barba RN, CWOCN  Pager no longer in use, please contact through [employer] group: [facility] Nurse [place]    Dept. Office Number: [phone]          "

## 2025-04-03 NOTE — PLAN OF CARE
Goal Outcome Evaluation:                      Assumed cares [id number]. Comfort cares continues. Shallow respirations. Fentanyl gtt continues. No PRNs given.

## 2025-04-03 NOTE — PROGRESS NOTES
"Care Management Follow Up    Length of Stay (days): 30    Expected Discharge Date: 2025     Concerns to be Addressed: discharge planning     Patient plan of care discussed at interdisciplinary rounds: Yes    Anticipated Discharge Disposition:  (TBD)  Anticipated Discharge Services:  (TBD)  Anticipated Discharge DME: None    Patient/family educated on Medicare website which has current facility and service quality ratings: no  Education Provided on the Discharge Plan: No  Patient/Family in Agreement with the Plan: unable to assess    Referrals Placed by CM/SW:  N/A  Private pay costs discussed: Not applicable    Discussed  Partnership in Safe Discharge Planning  document with patient/family: No     Handoff Completed: No, handoff not indicated or clinically appropriate    Additional Information:  Care conference took place yesterday with the patient's daughter Joy, patient's son Hemanth, GIP team, and primary medicine team. Ultimately the patient was not signed onto hospice and continues on comfort cares. The primary medicine team anticipates the patient will pass away in the hospital.    SW received a message from the patient's OP SW Ella Hernandez reporting Joy is reaching out for assistance with the social security office. Joy reported that the social security office has marked the patient \"\" in their system. Joy reported that the patient's rent is due soon and she is needing the money to cover the rent and is unable to pay rent without the money from social security. Joy reportedly has a video appointment with the social security office this afternoon.    IP team is unable to assist directly with the social security office as it appears to be an error in their system. SW can provide financial resources to Joy if needed.    SW printing out , burial, and cremation resources to provide to the patient's family. Per the primary team the patient is anticipated to pass away within " the next few days.     SW will plan to reach out to patient's daughter Jeannie tomorrow to discuss if she needs any financial resources.     Next Steps: Will discuss financial resources with Jeannie on Friday 4/4.  BANDAR will continue to follow for discharge needs.    BRIANNA Forbes  [address]   Office: [phone]  [email or web address]  Securely message with "[employer] (Search Name or 7[address] Med Surg [id number] )  Text page via [facility] Paging/Directory   See signed in provider for up to date coverage information  Social Work & Care Management Department

## 2025-04-04 PROCEDURE — 250N000011 HC RX IP 250 OP 636

## 2025-04-04 PROCEDURE — 99238 HOSP IP/OBS DSCHRG MGMT 30/<: CPT | Performed by: INTERNAL MEDICINE

## 2025-04-04 PROCEDURE — 99207 PR NO BILLABLE SERVICE THIS VISIT: CPT | Performed by: PHYSICIAN ASSISTANT

## 2025-04-04 RX ADMIN — Medication 1 SPRAY: at 07:48

## 2025-04-04 RX ADMIN — Medication 200 MCG/HR: at 07:46

## 2025-04-04 RX ADMIN — Medication 1 SPRAY: at 12:27

## 2025-04-04 ASSESSMENT — ACTIVITIES OF DAILY LIVING (ADL)
ADLS_ACUITY_SCORE: 96

## 2025-04-04 NOTE — PROGRESS NOTES
"Care Management Follow Up    Length of Stay (days): 31    Expected Discharge Date: 2025     Concerns to be Addressed: discharge planning     Patient plan of care discussed at interdisciplinary rounds: Yes    Anticipated Discharge Disposition:  (TBD)  Anticipated Discharge Services:  (TBD)  Anticipated Discharge DME: None    Patient/family educated on Medicare website which has current facility and service quality ratings: no  Education Provided on the Discharge Plan: No  Patient/Family in Agreement with the Plan: unable to assess    Referrals Placed by CM/SW:  N/A  Private pay costs discussed: Not applicable    Discussed  Partnership in Safe Discharge Planning  document with patient/family: No     Handoff Completed: No, handoff not indicated or clinically appropriate    Additional Information:  Patient continues on comfort cares. Primary team anticipates the patient will pass away in the hospital.    @1121 BANDAR called and spoke with the patient's daughter Joy regarding questions about social security and finances. Joy reported frustration with the patient being marked as  by Social Security. Patient was reportedly marked as  24. Joy reported she spoke with the Social Security office yesterday and they wanted to video chat with Joy and the patient. Joy stated \"I feel like that is a violation of HIPAA. That's a HIPAA violation!\" Other family members could be heard in the background yelling and agreeing. BANDAR stated the Social Security office needs some sort of proof that the patient is not . Joy asked \"well can't you guys just write a letter?\" BANDAR stated that would not be sufficient. BANDAR read off from the Social Security website pieces of identification that could be brought in to the Social Security office. BANDAR stated if done that way the patient would need to be present and that is not currently an option which leaves the alternative option of a video call. " "Joy asked if that is allowed. BANDAR stated yes as they need proof the patient is not . Joy stated \"I just dont know who told them that the patient is . It's not coming from family so it must be the hospital.\" BANDAR validated Joy's feelings of frustration over the situation. BANDAR stated the patient is currently not listed in their system, as  and unfortunately SW is unable to assist with the situation as Social Security is a government agency and has  process they follow to resolve these sort of issues. At this point in time Joy was speaking with other people with SW on the phone stating \"It was that  that reported the patient as  to Socials Security!\" Other inaudible conversation could be heard. BANDAR clarified with Joy that this SW does not know how the error occurred, however the patient is NOT listed as  in the [facility] system. Joy stated \"No not you, it was a  that we worked with in November or December\" Joy asked if the patient will continue to receive care. BANDAR stated yes the team will continue to care for the patient. Joy asked if she should further discuss with the Social Security office. This BANDAR encouraged that. Joy stated she will contact the Social Security office and will visit later in the day. Joy declined any financial resources    Next Steps: BANDAR will continue to follow for discharge needs and resources.     BRIANNA Forbes  [address]   Office: [phone]  [email or web address]  Securely message with [employer] (Search Name or 7C Med Surg [id number] BANDAR)  Text page via [facility] Paging/Directory   See signed in provider for up to date coverage information  Social Work & Care Management Department        "

## 2025-04-04 NOTE — PROGRESS NOTES
[facility]    Medicine Progress Note - Hospitalist Service, GOLD TEAM 2    Date of Admission:  3/4/2025    Assessment & Plan   Alis Hartman is a 69 year old female with PMH of cervical cancer s/p radiation, serous endometrial adenocarcinoma s/p SNEHA/BSO (hysterectomy/ovarian removal) and cystotomy w/ suprapubic catheter placement (7/2024) as well as several cycles of carbo/taxel (10/2024), hx ESBL urosepsis and bacteremia, RNY gastric bypass, afib not on apixaban (stopped due to vaginal bleeding), HTN, CKD3, who was initially admitted to [facility] for further evaluation and treatment of increased confusion, poor appetite and generalized weakness.  Hospital course has been complicated by MADHU, hypervolemia, acute on chronic pain, suspected dementia, and decreased oral intake. Transferred to the ICU 3/20 due to respiratory decline. Was intubated and had PEA arrest x2 following intubation. There was also concern for gastric perforation. Transitioned to comfort cares 3/27. Medicine assumed cares as primary 3/28.      Comfort Cares: Prior to intubation and ICU transfer, there were plans to discharge home on hospice on 3/20. After long discussion with ICU team and palliative care, patient transitioned to full comfort cares on 3/27. She is much more somnolent today.   - Palliative Care following   - [facility] consulted, family will reach out if they wish to enroll   - Care conference on 4/2 with myself, [facility], children (Hemanth Hamilton). Family is feeling overwhelmed with frequent communication from hospital staff, they would like some space. We will plan to reach out to family if patient's condition changes, otherwise family will plan to contact us as needed.    - Continue supportive care   - Artificial saliva QID   - Butrans patch in place (applied weekly)   - Robinul PRN   - Fentanyl gtt   - Zofran PRN             Diet: Regular Diet Adult    DVT Prophylaxis:  N/A  Shahid Catheter: Not present  Lines: PRESENT      Port a Cath 02/05/25 Single Lumen Right Chest wall-Site Assessment: WDL      Cardiac Monitoring: None  Code Status: No CPR- Do NOT Intubate      Clinically Significant Risk Factors               # Hypoalbuminemia: Lowest albumin = 1.5 g/dL at 3/21/2025 12:10 AM, will monitor as appropriate     # Hypertension: Noted on problem list             # Severe Malnutrition: based on nutrition assessment and treatment provided per dietitian's recommendations.    # Financial/Environmental Concerns: none         Social Drivers of Health    Food Insecurity: Unknown (3/12/2025)    Food Insecurity     Within the past 12 months, did you worry that your food would run out before you got money to buy more?: Patient unable to answer     Within the past 12 months, did the food you bought just not last and you didn t have money to get more?: Patient unable to answer   Housing Stability: Unknown (3/12/2025)    Housing Stability     Do you have housing? : Patient unable to answer     Are you worried about losing your housing?: Patient unable to answer   Financial Resource Strain: Unknown (3/12/2025)    Financial Resource Strain     Within the past 12 months, have you or your family members you live with been unable to get utilities (heat, electricity) when it was really needed?: Patient unable to answer   Transportation Needs: Unknown (3/12/2025)    Transportation Needs     Within the past 12 months, has lack of transportation kept you from medical appointments, getting your medicines, non-medical meetings or appointments, work, or from getting things that you need?: Patient unable to answer   Interpersonal Safety: Unknown (3/12/2025)    Interpersonal Safety     Do you feel physically and emotionally safe where you currently live?: Patient unable to answer     Within the past 12 months, have you been hit, slapped, kicked or otherwise physically hurt by someone?: Patient unable to  answer     Within the past 12 months, have you been humiliated or emotionally abused in other ways by your partner or ex-partner?: Patient unable to answer          Disposition Plan     Medically Ready for Discharge: Anticipated in 5+ Days She will remain hospitalized through end of life.            The patient's care was discussed with the Attending Physician, Dr. Payne and Bedside Nurse.    Jacquelin Winston PA-C  Hospitalist Service, [address]  Securely message with [employer]enta (more info)  Text page via [facility] Paging/Directory   See signed in provider for up to date coverage information  ______________________________________________________________________    Interval History   No acute events overnight. Unresponsive. No urinary output. Comfortable.    Physical Exam   Vital Signs:                    Weight: 136 lbs 14.49 oz    Somnolent. Extremities cool. Appears comfortable.     Medical Decision Making       55 MINUTES SPENT BY ME on the date of service doing chart review, history, exam, documentation & further activities per the note.      Data   ------------------------- PAST 24 HR DATA REVIEWED -----------------------------------------------        Imaging results reviewed over the past 24 hrs:   No results found for this or any previous visit (from the past 24 hours).

## 2025-04-04 NOTE — CONSULTS
"SPIRITUAL HEALTH SERVICES Consult Note (Palliative)  [facility] ([place] 7C     Summary: Prayer with patient Alis \"Aranza\" Washington's body as she is known to value her Jain nicholas. Per notes, Dr. Payne spoke with daughter Joy by phone to inform her of Aranza's death; no family present at time of visit.     Plan: Chaplains are available for further bereavement support if requested while Aranza is admitted, with no plans to follow unless consulted.     Milile Pena M.Div., [facility]     To reach Spiritual Health, securely message with the Vocera Web Console or enter an ASAP/STAT consult in [employer], which will also page the on-call .    "

## 2025-04-04 NOTE — PLAN OF CARE
Goal Outcome Evaluation:    rFLACC 0.Turning patient as tolerated--oral cares & face washed this evening. Fentanyl gtt continues. No BM on shift, no urine output. Continue w/ comfort measures.

## 2025-04-04 NOTE — DEATH PRONOUNCEMENT
MD DEATH PRONOUNCEMENT    Called to pronounce Alis Hartman dead.    Physical Exam: Unresponsive to noxious stimuli, Spontaneous respirations absent, Breath sounds absent, Carotid pulse absent, Heart sounds absent, Pupillary light reflex absent, Corneal blink reflex absent, and gag reflex absent.    Patient was pronounced dead at 1:10 PM, 2025.    Preliminary Cause of Death: metastatic endometrial adenocarcinoma    Active Problems:    History of urinary tract infection    Lower abdominal pain    Suprapubic catheter (H)    Delirium    Acute UTI       Infectious disease present?: YES    Communicable disease present? (examples: HIV, chicken pox, TB, Ebola, CJD) :  NO    Multi-drug resistant organism present? (example: MRSA): YES: MRSA, VRE, ESBL klebsiella    Please consider an autopsy if any of the following exist:  NO Unexpected or unexplained death during or following any dental, medical, or surgical diagnostic treatment procedures.   NO Death of mother at or up to seven days after delivery.     NO All  and pediatric deaths.     NO Death where the cause is sufficiently obscure to delay completion of the death certificate.   NO Deaths in which autopsy would confirm a suspected illness/condition that would affect surviving family members or recipients of transplanted organs.     The following deaths must be reported to the 's Office:  NO A death that may be due entirely or in part to any factors other than natural disease (recent surgery, recent trauma, suspected abuse/neglect).   NO A death that may be an accident, suicide, or homicide.     NO Any sudden, unexpected death in which there is no prior history of significant heart disease or any other condition associated with sudden death.   NO A death under suspicious, unusual, or unexpected circumstances.    NO Any death which is apparently due to natural causes but in which the  does not have a personal physician familiar with  the patient s medical history, social, or environmental situation or the circumstances of the terminal event.   NO Any death apparently due to Sudden Infant Death Syndrome.     NO Deaths that occur during, in association with, or as consequences of a diagnostic, therapeutic, or anesthetic procedure.   NO Any death in which a fracture of a major bone has occurred within the past (6) six months.   NO A death of persons note seen by their physician within 120 days of demise.     NO Any death in which the  was an inmate of a public institution or was in the custody of Law Enforcement personnel.   NO  All unexpected deaths of children   NO Solid organ donors   NO Unidentified bodies   NO Deaths of persons whose bodies are to be cremated or otherwise disposed of so that the bodies will later be unavailable for examination;   NO Deaths unattended by a physician outside of a licensed healthcare facility or licensed residential hospice program   NO Deaths occurring within 24 hours of arrival to a health care facility if death is unexpected.    NO Deaths associated with the decedent s employment.   NO Deaths attributed to acts of terrorism.   NO Any death in which there is uncertainty as to whether it is a medical examiner s care should be discussed with the medical investigator.        Body disposition: Autopsy was discussed with family member:  Daughter by phone.  Permission for autopsy was declined.

## 2025-04-04 NOTE — DISCHARGE SUMMARY
[facility]    Death Summary - Hospitalist Service, GOLD TEAM 2     Date of Admission:  3/4/2025  Date of Death: 4/4/2025  Discharging Provider: Jacquelin Winston PA-C    Discharge Diagnoses   Metastatic endometrial adenocarcinoma   Shock, multifactorial  Concern for perforated gastric ulcer  Acute hypoxic respiratory failure  Bilateral pleural effusions  Concern for seizures  Concern for CVA  Acute on chronic anemia likely secondary to chronic disease  Thrombocytopenia  Bladder dysfunction s/p cystostomy and suprapubic catheter  Oliguric MADHU  Physical deconditioning  Anasarca  Severe protein calorie malnutrition        Cause of death: metastatic endometrial adenocarcinoma     Hospital Course   Alis Hartman is a 69 year old female with PMH of cervical cancer s/p radiation, serous endometrial adenocarcinoma s/p SNEHA/BSO (hysterectomy/ovarian removal) and cystotomy w/ suprapubic catheter placement (7/2024) as well as several cycles of carbo/taxel (10/2024), hx ESBL urosepsis and bacteremia, RNY gastric bypass, afib not on apixaban (stopped due to vaginal bleeding), HTN, CKD3, who was initially admitted to [facility] for further evaluation and treatment of increased confusion, poor appetite and generalized weakness.  Hospital course has been complicated by MADHU, hypervolemia, acute on chronic pain, suspected dementia, and decreased oral intake. Transferred to the ICU 3/20 due to respiratory decline. Was intubated and had PEA arrest x2 following intubation. There was also concern for gastric perforation. Transitioned to comfort cares 3/27 and Palliative Care was consulted. She remained comfortable on a fentanyl infusion and passed peacefully on 4/4/25 at 1310.           ARIELLA Fernandez [facility]  ______________________________________________________________________      Significant Results and Procedures   Results  for orders placed or performed during the hospital encounter of 03/04/25   CT Abdomen Pelvis w/o Contrast    Narrative    EXAM: CT ABDOMEN PELVIS W/O CONTRAST  LOCATION: [facility]  DATE: 3/4/2025    INDICATION: Abdominal pain; decrease oral appetite  COMPARISON: Renal ultrasound 2/7/2025, CT abdomen/pelvis 12/12/2024  TECHNIQUE: CT scan of the abdomen and pelvis was performed without IV contrast. Multiplanar reformats were obtained. Dose reduction techniques were used.  CONTRAST: None.    FINDINGS:   LOWER CHEST: Calcified granuloma in the left lower lobe. No airspace consolidation or pleural effusion.    HEPATOBILIARY: Cholelithiasis without evidence of acute cholecystitis or biliary obstruction.    PANCREAS: Normal.    SPLEEN: Multiple calcified splenic granulomas, unchanged from prior exam.    ADRENAL GLANDS: Normal.    KIDNEYS/BLADDER: Interval removal of left percutaneous nephrostomy tube. No hydronephrosis. Urinary bladder is decompressed by a suprapubic catheter, similar to prior exam.    BOWEL: Prior gastric bypass with jejunojejunostomy in expected position in the left midabdomen. No obstruction or inflammatory change.    LYMPH NODES: No suspicious lymphadenopathy. No abdominopelvic free fluid.    VASCULATURE: Moderate calcified atherosclerosis.    PELVIC ORGANS: Hysterectomy.    MUSCULOSKELETAL: No acute bony abnormality. Moderate to severe body wall edema, similar to prior exam.      Impression    IMPRESSION:   1.  No definite acute abnormality in the abdomen or pelvis on this noncontrast exam.  2.  Cholelithiasis without evidence of acute cholecystitis or biliary obstruction.     US Renal Complete Non-Vascular    Narrative    EXAMINATION: US RENAL COMPLETE NON-VASCULAR, 3/8/2025 3:27 PM     COMPARISON: 2/7/2005.    HISTORY: MADHU rule out hydronephrosis    TECHNIQUE: The kidneys and bladder were scanned in the standard  fashion with specialized ultrasound  transducer(s) using both gray  scale and limited color/spectral Doppler techniques.    FINDINGS:    Right kidney: Measures 8.1 cm in length. . No focal mass. No  hydronephrosis.    Left kidney: Measures 8.1 cm in length. . No focal mass. No  hydronephrosis.     Bladder: Not visualized      Impression    IMPRESSION: Small bilateral kidneys without hydronephrosis.    I have personally reviewed the examination and initial interpretation  and I agree with the findings.    LUIS DESHPANDE MD         SYSTEM ID:  [id number]   CT Abdomen Pelvis w/o Contrast    Narrative    EXAM: CT ABDOMEN PELVIS W/O CONTRAST 3/17/2025 4:01 PM    HISTORY: 69 years Female abdominal pain, patient unable to further  characterize.    COMPARISON: CT abdomen/pelvis 3/4/2025.    TECHNIQUE: Axial CT images from the lung bases through the symphysis  pubis were obtained without IV contrast. Coronal and sagittal  reformats provided.     FINDINGS:    LINES/TUBES: Suprapubic catheter terminates in the bladder..    LUNG BASES: Small right and trace left pleural effusion. Right  middle/lower lobe dependent consolidation/atelectasis. Additional  probable compressive atelectasis in the left lung base. Left lower  lobe calcified granuloma. Multiple calcified mediastinal lymph nodes.  Mitral annular calcifications.    HEPATIC: No suspicious focal hepatic masses or lesions.    BILIARY: Cholelithiasis and sludge. No significant gallbladder wall  thickening or pericholecystic inflammatory change. No intra or  extrahepatic biliary dilatation.    SPLEEN: Multiple punctate splenic granulomas.    PANCREAS: No mass or peripancreatic fluid.    ADRENALS: Unremarkable.    RENAL: No hydronephrosis, calculi, or mass.    URINARY BLADDER: Decompressed with suprapubic catheter in place.    PELVIC ORGANS: Unremarkable.    GASTROINTESTINAL: Postoperative changes of gastric bypass. Small and  large bowel are normal in caliber and without abnormal wall  thickening. No portal  venous gas or pneumatosis.     MESENTERY/PERITONEUM: No ascites or pneumoperitoneum.    LYMPH NODES: No lymphadenopathy.    VASCULAR: Bilateral common iliac to common femoral artery stents.  Scattered atherosclerotic calcifications.    MUSCULOSKELETAL: No acute or suspicious osseous lesion. Degenerative  change throughout the spine, hips, and sacroiliac joints. Diffuse  muscular atrophy and anasarca.      Impression    IMPRESSION:     1. Small/moderate and trace left pleural effusions with bilateral  lower lobe consolidations, right greater than left. Consolidations may  represent dependent/compressive atelectasis, though underlying  infection is not excluded.  2. Otherwise no acute findings in the abdomen or pelvis.  3. Third spacing of fluid with marked anasarca.    I have personally reviewed the examination and initial interpretation  and I agree with the findings.    FORREST RAMIREZ MD         SYSTEM ID:  [id number]   CT Head w/o Contrast    Narrative    EXAM: CT HEAD W/O CONTRAST  LOCATION: [facility]  DATE: 3/21/2025    INDICATION: Change in mentation, severe thrombocytopenia.  COMPARISON: None.  TECHNIQUE: Routine CT Head without IV contrast. Multiplanar reformats. Dose reduction techniques were used.    FINDINGS:  INTRACRANIAL CONTENTS: No intracranial hemorrhage, extraaxial collection, or mass effect. Small age-indeterminate though possibly recent lacunar infarcts in each thalamus, slightly larger on the right. Mild volume loss and presumed chronic small vessel   ischemia.    VISUALIZED ORBITS/SINUSES/MASTOIDS: No intraorbital abnormality. No paranasal sinus mucosal disease. No middle ear or mastoid effusion.    BONES/SOFT TISSUES: No acute abnormality.      Impression    IMPRESSION:  1.  Small age-indeterminate though possibly recent lacunar infarcts in each thalamus.    2.  No hemorrhage or mass effect.    CT head findings were discussed with Dr. Wise at  0153 hours on 3/21/2025.   XR Chest Port 1 View    Narrative    EXAM: XR CHEST PORT 1 VIEW 3/20/2025 10:02 PM    DEMOGRAPHICS: 69 years Female    INDICATION: labored breathing, new hypoxia    COMPARISON: X-ray 2/5/2025    TECHNIQUE: Single portable AP view of the chest.    FINDINGS:   Right chest wall tunneled central line catheter tip in the high SVC.    Trachea is midline. Cardiac silhouette is within normal limits. Small  left, and moderate right pleural effusions. No pneumothorax. Streaky  perihilar and bibasilar opacities. The upper abdomen is unremarkable.  No acute osseous abnormality.      Impression    IMPRESSION:   Moderate right, and small left pleural effusions with adjacent  consolidative opacities, as seen on CT 3/17/2025. Differential  diagnosis for consolidations continues to include atelectasis,  pulmonary edema and infection.    I have personally reviewed the examination and initial interpretation  and I agree with the findings.    HOWIE ROBLEDO MD         SYSTEM ID:  [id number]   XR Chest Port 1 View    Narrative    Exam: XR CHEST PORT 1 VIEW, 3/21/2025 12:04 AM    Indication: Endotracheal tube positioning    Comparison: Same day at 2158 hours    Findings:   Portable, frontal, supine view of the chest taken at 2353 hours.  Endotracheal tube tip terminates 3.0 cm and the sathya. The remainder  of the exam is unchanged from 2 hours prior.      Impression    Impression: Endotracheal tube tip terminates 3.0 cm from the sathya.  Otherwise no appreciable change from 2 hours prior given differences  in technique.    I have personally reviewed the examination and initial interpretation  and I agree with the findings.    HOWIE ROBLEDO MD         SYSTEM ID:  [id number]   CTA Head Neck w Contrast    Narrative    EXAM: CTA HEAD NECK W CONTRAST  LOCATION: [facility]  DATE: 03/21/2025    INDICATION: Code Stroke, evaluate for LVO. PLEASE READ IMMEDIATELY  COMPARISON:  None.  CONTRAST: 67 mL Isovue 370.  TECHNIQUE: Head and neck CT angiogram with IV contrast helical CT images of the head and neck vessels obtained during the arterial phase of intravenous contrast administration. Axial 2D reconstructed images and multiplanar 3D MIP reconstructed images of   the head and neck vessels were performed by the technologist. Dose reduction techniques were used. All stenosis measurements made according to NASCET criteria unless otherwise specified.    HEAD CT:  FINDINGS: The examination is essentially nondiagnostic. Contrast is seen within the left carotid artery extending intracranially. This artery appears grossly patent. 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   The remainder of the intracranial and neck vessels are not well opacified. Large pleural effusions.      Impression    IMPRESSION:   1.  Essentially nondiagnostic study apart from patency of the left carotid artery. The remainder of the neck and intracranial vessels are not well opacified due to technical issues and the patient coding on the table.    2.  Large bilateral pleural effusions.    Findings were relayed to Dr. Wise at 0153 on 03/21/2025.   XR Feeding Tube Placement (for bedside placement contraindication)    Narrative    EXAM: XR FEEDING TUBE PLACEMENT 3/21/2025 4:32 PM    COMPARISON: CT abdomen/pelvis 3/21/2025    HISTORY: 69 years Female kiko en y anatomy    Fluoroscopy time: 1 minutes.    TECHNIQUE: After injection of Xylocaine gel into the right nostril, a  feeding tube was advanced under fluoroscopic guidance.    FINDINGS: Patient has a history of Kiko-en-Y gastric bypass. The  feeding tube was advanced with the tip in the jejunum. A small amount  of barium was injected to define anatomy and demonstrate placement  within the small bowel. Feeding tube was then flushed with normal  saline. The feeding tube was secured using a nasal bridle device.      Impression     IMPRESSION: Uncomplicated feeding tube placement with tip in the  jejunum.    I, SILVIA ALLEN DO, attest that I was immediately available to provide  guidance and assistance during the entirety of the procedure.    I have personally reviewed the examination and initial interpretation  and I agree with the findings.    SILVIA ALLEN DO         SYSTEM ID:  [id number]   CT Head w/o Contrast    Narrative    CT angiogram of the head with contrast  Head CT without contrast    History:  AMS, tremors, follow up from code stroke last night.  Comparison:  CT 3/21/2025, 7/29/2024      Technique: Initial noncontrast images from the skull base to the  vertex were obtained, and reviewed in bone, brain, and subdural  windows.    HEAD and NECK CTA: During rapid bolus intravenous injection of  nonionic contrast material, axial images were obtained using thin  collimation multidetector helical technique from the base of the upper  aortic arch through the [place]. This CT angiogram data was  reconstructed at thin intervals with mild overlap. Images were sent to  the [employer] workstation, and 3D reconstructions were obtained. The  axial source images, multiplanar reformations, 3D reconstructions in  both maximum intensity projection display and volume rendered models  were reviewed, with reconstructions performed by the technologist and  the radiologist    Findings:    Noncontrast head CT:  No acute intracranial hemorrhage, mass effect, or midline shift. No  acute loss of gray-white matter differentiation in the cerebral  hemispheres. Ventricles are proportionate to the cerebral sulci. Clear  basal cisterns. Mild to moderate generalized parenchymal atrophy.  Patchy hypoattenuation of the periventricular and subcortical white  matter which is nonspecific but likely representative of chronic small  vessel ischemic disease given patient's age    The bony calvaria and the bones of the skull base are normal. Trace  mucosal thickening  of the paranasal sinuses. The mastoid air cells are  clear. Grossly normal orbits.     Head CTA demonstrates no definite aneurysm or stenosis of the major  intracranial arteries. The anterior communicating artery is patent.  Hypoplastic A1 segment of the right anterior cerebral artery. Fetal  origin of the right posterior cerebral artery.    Neck CTA demonstrates no stenosis of the major cervical arteries. The  origins of the great vessels from the aortic arch are patent. The  normal distal right internal carotid artery measures 5 mm. The normal  distal left internal carotid artery measures 5 mm.     No mass within the visualized portions of the cervical soft tissues.  Diffuse body wall anasarca. Large bilateral pleural effusions with  mild overlying compressive atelectasis.       Impression    Impression:    1. No acute intracranial pathology on the noncontrast head CT.  2. Head CTA demonstrates no definite aneurysm or stenosis of the major  intracranial arteries.   3. Neck CTA demonstrates no stenosis of the major cervical arteries.  4. Large bilateral pleural effusions.  5. Diffuse body wall anasarca.      I have personally reviewed the examination and initial interpretation  and I agree with the findings.    RONALD MONTEZ MD         SYSTEM ID:  [id number]   CTA Head Neck with Contrast    Narrative    CT angiogram of the head with contrast  Head CT without contrast    History:  AMS, tremors, follow up from code stroke last night.  Comparison:  CT 3/21/2025, 7/29/2024      Technique: Initial noncontrast images from the skull base to the  vertex were obtained, and reviewed in bone, brain, and subdural  windows.    HEAD and NECK CTA: During rapid bolus intravenous injection of  nonionic contrast material, axial images were obtained using thin  collimation multidetector helical technique from the base of the upper  aortic arch through the [place]. This CT angiogram data was  reconstructed at thin intervals  with mild overlap. Images were sent to  the [employer] workstation, and 3D reconstructions were obtained. The  axial source images, multiplanar reformations, 3D reconstructions in  both maximum intensity projection display and volume rendered models  were reviewed, with reconstructions performed by the technologist and  the radiologist    Findings:    Noncontrast head CT:  No acute intracranial hemorrhage, mass effect, or midline shift. No  acute loss of gray-white matter differentiation in the cerebral  hemispheres. Ventricles are proportionate to the cerebral sulci. Clear  basal cisterns. Mild to moderate generalized parenchymal atrophy.  Patchy hypoattenuation of the periventricular and subcortical white  matter which is nonspecific but likely representative of chronic small  vessel ischemic disease given patient's age    The bony calvaria and the bones of the skull base are normal. Trace  mucosal thickening of the paranasal sinuses. The mastoid air cells are  clear. Grossly normal orbits.     Head CTA demonstrates no definite aneurysm or stenosis of the major  intracranial arteries. The anterior communicating artery is patent.  Hypoplastic A1 segment of the right anterior cerebral artery. Fetal  origin of the right posterior cerebral artery.    Neck CTA demonstrates no stenosis of the major cervical arteries. The  origins of the great vessels from the aortic arch are patent. The  normal distal right internal carotid artery measures 5 mm. The normal  distal left internal carotid artery measures 5 mm.     No mass within the visualized portions of the cervical soft tissues.  Diffuse body wall anasarca. Large bilateral pleural effusions with  mild overlying compressive atelectasis.       Impression    Impression:    1. No acute intracranial pathology on the noncontrast head CT.  2. Head CTA demonstrates no definite aneurysm or stenosis of the major  intracranial arteries.   3. Neck CTA demonstrates no stenosis of the  major cervical arteries.  4. Large bilateral pleural effusions.  5. Diffuse body wall anasarca.      I have personally reviewed the examination and initial interpretation  and I agree with the findings.    RONALD MONTEZ MD         SYSTEM ID:  [id number]   CT Abdomen Pelvis w Contrast    Narrative    EXAM: CT ABDOMEN PELVIS W CONTRAST 3/21/2025 4:08 PM    HISTORY: 69 years Female c/f sepsis, looking for sources of infection.    COMPARISON: CT abdomen/pelvis 3/17/2025, 11/7/2024    TECHNIQUE: Axial CT images from the lung bases through the symphysis  pubis were obtained with IV contrast. Coronal and sagittal reformats  provided. Contrast dose: 154  ml Isovue 370.    FINDINGS:    LINES/TUBES: Suprapubic catheter terminates in the bladder.    LUNG BASES: Small/moderate bilateral pleural effusions with associated  compressive atelectasis.    HEPATIC: No suspicious focal hepatic masses or lesions. Subcentimeter  hypodensity in the left lobe of liver is too small to characterize,  likely a simple cyst.    BILIARY: Distended gallbladder. No calcified gallstones. No intra or  extrahepatic biliary dilatation. Small volume pericholecystic fluid.    SPLEEN: Normal size. No focal lesions.    PANCREAS: No mass or peripancreatic fluid.    ADRENALS: Unremarkable.    RENAL: No hydronephrosis, calculi, or mass. Bilateral renal cortical  scarring, with several areas of wedge-shaped hypoattenuation in the  left kidney, likely secondary to prior vascular insults.    URINARY BLADDER: Suprapubic catheter in place. Contrast media within  the lumen..    PELVIC ORGANS: No pelvic masses.    GASTROINTESTINAL: Postoperative changes of gastric bypass. Question  possible perforated marginal ulcer of the anterior aspect of the  gastric pouch (series 4 image 72, series 7 image 73). Surrounding  fluid and stranding, though potentially confounded by extensive  mesenteric edema. Normal caliber large and small bowel. Rectal wall  thickening with  somewhat lobulated contour.    MESENTERY/PERITONEUM: Small volume intra-abdominal ascites, with  diffuse mesenteric edema.    LYMPH NODES: No lymphadenopathy.    VASCULAR: Major abdominal vessels appear patent. Mild atherosclerotic  vascular calcifications.    MUSCULOSKELETAL: Degenerative changes throughout the spine, hips,  sacroiliac joints. No suspicious bony lesions. No acute osseous  abnormality. Diffuse muscular atrophy. Extensive anasarca. Moderate to  large fat-containing right inguinal hernia.      Impression    IMPRESSION:   1.  Questionable discontinuity of the anterior aspect of the gastric  pouch, concerning for perforated marginal ulcer. Consider further  evaluation with endoscopy.  2.  Diffuse rectal wall thickening, which may represent nonspecific  proctitis or mucosal edema secondary to fluid resuscitation.  3.  Sequela of volume overload including bilateral pleural effusions,  ascites, extensive mesenteric edema, and diffuse anasarca.  4.  Additional chronic and incidental findings as described in the  body of the report.    I have personally reviewed the examination and initial interpretation  and I agree with the findings.    SILVIA ALLEN DO         SYSTEM ID:  [id number]   CT Chest Pulmonary Embolism w Contrast    Narrative    CT chest pulmonary angiogram with contrast    INDICATION: Concern for pulmonary embolus. Concern for sepsis. Looking  for source of infection.    CONTRAST: 221 mL intravenous Isovue-370. Patient also underwent head  CT today and abdomen pelvis CT today.    COMPARISON: Most recent chest CT 11/27/2024    FINDINGS: Thyroid appears enlarged. Prominent pleural effusions  bilaterally the paranasal asymmetric. Associated atelectasis. Heart  size normal. No obvious pericardial effusion. Thoracic aorta normal  caliber. Pulmonary artery system well opacified with contrast. The  main pulmonary artery caliber was normal approximately 2.5 cm.  There is mild atherosclerotic calcification  in the thoracic aorta. No  hypodense filling defect or wall-appearing hypodensity to indicate  obvious acute or chronic pulmonary and most. No weblike deformities of  the pulmonary artery system. As another possible secondary sign of  pulmonary embolus. Body wall edema suggestive of anasarca. Catheter  tip SVC. No right heart enlargement.  Endotracheal tube tip approximately 2.1 cm above the sathya  The aerated portion of the lungs shows no nodule. No pneumothorax. No  pneumomediastinum. No pneumoperitoneum.  The included upper abdomen again is incompletely imaged comment please  review separate abdomen and pelvis CT for further detail. Left adrenal  does appear somewhat hyperplastic on this incomplete imaging set.  Nodules lateral to the right kidney and medial to the inferior right  hepatic lobe, there is a incompletely imaged soft tissue density which  on the abdomen CT appears to represent loop of bowel.  Bone detail shows osteopenia. Mild degenerative changes in the  thoracic spine. Degenerative changes of the left shoulder glenohumeral  joint with joint space narrowing.      Impression    IMPRESSION:  1. No CT evidence of pulmonary embolus.  2. Large bilateral pleural effusions with associated atelectasis.  3. Intubated.  4. Aortic atherosclerosis.    SHAYNA ADKINS MD         SYSTEM ID:  [id number]   XR Chest Port 1 View    Narrative    Examination:  XR CHEST PORT 1 VIEW    Date:  3/22/2025 10:48 AM     Clinical Information: intubated     Comparison: CT dated 3/21/2025    Findings:   Portable AP view of the chest. Stable positioning of endotracheal tube  tip in the midtrachea. Esophageal temperature probe is in the distal  esophagus. Partially visualized enteric tube. Stable positioning of  right central line tip in the SVC. Stable cardiomediastinal  silhouette. No significant change in bilateral pleural effusions,  right greater than left. Unchanged mixed bibasilar pulmonary  opacities. No discernible  pneumothorax.      Impression    Impression:  1. Endotracheal tube tip terminates in the midthoracic trachea.  2. No significant change in bilateral pleural effusions, right greater  than left.  3. Unchanged mixed bibasilar pulmonary opacities, likely  edema/atelectasis.    RENNY SWANN MD         SYSTEM ID:  [id number]   XR Chest Port 1 View    Narrative    Examination:  XR CHEST PORT 1 VIEW    Date:  3/23/2025 6:52 AM     Clinical Information: intubated     Comparison: 3/22/2025    Findings:   Portable, frontal view of the chest at 30 degrees. Endotracheal tube  tip terminates in the mid thoracic trachea. Right chest wall donald  catheter terminates in the SVC. Partially included feeding tube.  Esophageal temperature probe terminates in the low thoracic esophagus.    Trachea is midline. Stable cardiomediastinal silhouette. The  costophrenic angles are collimated out of the field of view with  continued pleural effusions bilaterally. Similar mixed bibasilar  pulmonary opacities. No discernible pneumothorax. Degenerative changes  of the thoracic spine. No acute osseous abnormality.      Impression    Impression:  1. Endotracheal tube tip terminates in the midthoracic trachea.  2. Similar bilateral pleural effusions with bibasilar mixed opacities  compatible with atelectasis and/or edema.    I have personally reviewed the examination and initial interpretation  and I agree with the findings.    RENNY SWANN MD         SYSTEM ID:  [id number]   XR Chest Port 1 View    Narrative    Exam: XR CHEST PORT 1 VIEW, 3/24/2025 6:39 AM    Indication: intubated    Comparison: 3/23/2025.    Findings:   Portable semiupright AP view of the chest. Endotracheal tube tip  projects 3.8 cm above the sathya. Right chest wall Port-A-Cath tip  projects over the upper SVC. Esophageal temperature probe tip projects  over the distal esophagus. Enteric tube courses below the diaphragm  with tip out of the field of view. Trachea is  midline.  Cardiomediastinal silhouette is within normal limits. Streaky/hazy  perihilar and bibasilar opacities. No significant pneumothorax.  Similar to slightly increased blunting of the right costophrenic  angle. Left costophrenic angle is clear. Visualized portions of the  upper abdomen are unremarkable. No acute osseous abnormality.      Impression    Impression:  1. Stable positioning of support devices.  2. Similar to slightly increased moderate right pleural effusion.  3. Streaky/hazy perihilar and bibasilar opacities, likely  atelectasis/edema.    I have personally reviewed the examination and initial interpretation  and I agree with the findings.    SHAYNA ADKINS MD         SYSTEM ID:  [id number]   Echo Complete     Value    LVEF  60-65%    Narrative    [id number]  [id number]  [id number]  [id number]^BRENDAN^EFREN^AUSTYN     [facility],[place]  Echocardiography Laboratory  [address] [place] [id number]     Name: QUIQUE WILLIS  MRN: [id number]  : 1955  Study Date: 2025 09:07 AM  Age: 69 yrs  Gender: Female  Patient Location: [facility]  Reason For Study: Shock  Ordering Physician: EFREN CARDONA  Performed By: Jaya Gregory     BSA: 1.8 m2  Height: 63 in  Weight: 159 lb  BP: 107/60 mmHg  ______________________________________________________________________________  Procedure  Echocardiogram with two-dimensional, color and spectral Doppler.  ______________________________________________________________________________  Interpretation Summary  Global and regional left ventricular function is normal with an EF of 60-65%.  Global right ventricular function is normal.  Mild to moderate tricuspid insufficiency is present. The right ventricular  systolic pressure is 31mmHg above the right atrial pressure.  ______________________________________________________________________________  Left Ventricle  Global and regional left ventricular function is normal with an  EF of 60-65%.  Left ventricular size is normal. Thickening of the anterobasal septum is  present. Diastolic Doppler findings (E/E' ratio and/or other parameters)  suggest left ventricular filling pressures are increased. No regional wall  motion abnormalities are seen.     Right Ventricle  The right ventricle is normal size. Global right ventricular function is  normal.     Mitral Valve  Mild mitral annular calcification is present. Trace mitral insufficiency is  present.     Aortic Valve  Mild aortic valve calcification is present.     Tricuspid Valve  Mild to moderate tricuspid insufficiency is present. The right ventricular  systolic pressure is 31mmHg above the right atrial pressure.     Pulmonic Valve  The valve leaflets are not well visualized. On Doppler interrogation, there is  no significant stenosis or regurgitation.     Vessels  Unable to assess mean RA pressure given the patient is on a ventilator.     Pericardium  Trivial pericardial effusion is present.     Compared to Previous Study  This study was compared with the study from 24 tricuspid regurgitation  has progressed. .     ______________________________________________________________________________  MMode/2D Measurements & Calculations  IVSd: 0.85 cm  LVIDd: 3.7 cm  LVIDs: 2.5 cm  LVPWd: 0.77 cm     FS: 33.2 %  LV mass(C)d: 83.3 grams  LV mass(C)dI: 47.5 grams/m2  LVOT diam: 2.0 cm  LVOT area: 3.2 cm2  RWT: 0.42     Doppler Measurements & Calculations  MV E max steve: 73.4 cm/sec  MV A max steve: 111.1 cm/sec  MV E/A: 0.66  MV dec slope: 401.0 cm/sec2  MV dec time: 0.18 sec  TR max steve: 279.3 cm/sec  TR max P.2 mmHg  E/E' av.0  Lateral E/e': 15.3  Medial E/e': 18.7  RV S Steve: 9.7 cm/sec     ______________________________________________________________________________  Report approved by: Tania Marks Dr on 2025 09:56 AM             Consultations This Hospital Stay   CARE MANAGEMENT / SOCIAL WORK IP CONSULT  WOUND  OSTOMY CONTINENCE NURSE  IP CONSULT  PHYSICAL THERAPY ADULT IP CONSULT  OCCUPATIONAL THERAPY ADULT IP CONSULT  GYNECOLOGIC ONCOLOGY ADULT IP CONSULT  NUTRITION SERVICES ADULT IP CONSULT  PALLIATIVE CARE ADULT IP CONSULT  WOUND OSTOMY CONTINENCE NURSE  IP CONSULT  SOCIAL WORK IP CONSULT  PSYCHIATRY IP CONSULT  IP PALLIATIVE CARE SOCIAL WORK ADULT CONSULT  NEPHROLOGY GENERAL ADULT IP CONSULT  CARE MANAGEMENT / SOCIAL WORK IP CONSULT  PHARMACY LIAISON FOR MEDICATION COVERAGE CONSULT  LYMPHEDEMA THERAPY IP CONSULT  ETHICS IP CONSULT  ETHICS IP CONSULT  NUTRITION SERVICES ADULT IP CONSULT  PHARMACY IP CONSULT  UROLOGY IP CONSULT  UROLOGY IP CONSULT  PSYCHIATRY IP CONSULT  WOUND OSTOMY CONTINENCE NURSE  IP CONSULT  SPIRITUAL HEALTH SERVICES IP CONSULT  PSYCHIATRY IP CONSULT  PHARMACY IP CONSULT  WOUND OSTOMY CONTINENCE NURSE  IP CONSULT  PHYSICAL THERAPY ADULT IP CONSULT  OCCUPATIONAL THERAPY ADULT IP CONSULT  NURSING TO CONSULT FOR VASCULAR ACCESS CARE IP CONSULT  NEUROLOGY CRITICAL CARE ADULT IP CONSULT  NUTRITION SERVICES ADULT IP CONSULT  WOUND OSTOMY CONTINENCE NURSE  IP CONSULT  PHARMACY IP CONSULT  SOCIAL WORK IP CONSULT  GI LUMINAL ADULT IP CONSULT  SURGERY GENERAL ADULT IP CONSULT  NURSING TO CONSULT FOR VASCULAR ACCESS CARE IP CONSULT  NURSING TO CONSULT FOR VASCULAR ACCESS CARE IP CONSULT  NURSING TO CONSULT FOR VASCULAR ACCESS CARE IP CONSULT  SPIRITUAL HEALTH SERVICES IP CONSULT  SPIRITUAL HEALTH SERVICES IP CONSULT  [facility] ADULT CONSULT  SPIRITUAL HEALTH SERVICES IP CONSULT    Primary Care Physician   Maria Fernanda Eckert    Time Spent on this Encounter   I, Jacquelin Winston PA-C, personally saw the patient today and spent greater than 30 minutes discharging this patient.

## 2025-04-04 NOTE — PLAN OF CARE
Goal Outcome Evaluation:    Pt somnolent/unresponsive with shallow breathing all morning. Pain managed with fentanyl infusion. Repositioned and perineum cleansed with A2. Wound cares done on bottom and bilateral thighs. At around 1300, this writer came to check on pt and pt was not breathing and had no pulse. MD came and pronounced pt dead. Donor line called. PIV and chest port de-accessed. Suprapubic catheter in place. Family came to visit. Security number given to daughter and was asked to provide  home info when one is found. Body and death paperwork picked up by security.

## (undated) DEVICE — GLOVE PROTEXIS BLUE W/NEU-THERA 7.0  2D73EB70

## (undated) DEVICE — SUCTION MANIFOLD NEPTUNE 2 SYS 4 PORT 0702-020-000

## (undated) DEVICE — PREP SKIN SCRUB TRAY 4461A

## (undated) DEVICE — KIT PATIENT POSITIONING PIGAZZI LATEX FREE 40580

## (undated) DEVICE — SPECIMEN CONTAINER 4OZ

## (undated) DEVICE — DRAIN JACKSON PRATT CHANNEL 15FR ROUND HUBLESS SIL JP-2228

## (undated) DEVICE — GLOVE PROTEXIS W/NEU-THERA 6.5  2D73TE65

## (undated) DEVICE — TUBING IRRIG CYSTO/BLADDER SET 81" LF 2C4040

## (undated) DEVICE — BAG URINARY DRAIN 4000ML LF 153509

## (undated) DEVICE — SUCTION TIP YANKAUER W/O VENT K86

## (undated) DEVICE — SU VICRYL 2-0 CT-2 27" J333H

## (undated) DEVICE — Device

## (undated) DEVICE — SOL WATER IRRIG 1000ML BOTTLE 2F7114

## (undated) DEVICE — PREP CHLORAPREP 26ML TINTED HI-LITE ORANGE 930815

## (undated) DEVICE — SU SILK 2-0 TIE 12X30" A305H

## (undated) DEVICE — KOH COLPOTOMIZER OCCLUDER  CPO-6

## (undated) DEVICE — SUCTION TIP YANKAUER STR K87

## (undated) DEVICE — WIPES FOLEY CARE SURESTEP PROVON DFC100

## (undated) DEVICE — DRAPE SHEET HALF 40X60" 9358

## (undated) DEVICE — APPLICATORS COTTON TIP 6"X2 STERILE LF C15053-006

## (undated) DEVICE — PACK VAG HYST

## (undated) DEVICE — CATH FOLYSIL 16FR 15ML AA6116

## (undated) DEVICE — TUBING SMOKE EVAC 2.2CMX3M SEA3715

## (undated) DEVICE — PREP DYNA-HEX 4% CHG SCRUB 4OZ BOTTLE MDS098710

## (undated) DEVICE — ESU LIGASURE LAPAROSCOPIC BLUNT TIP SEALER 5MMX37CM LF1837

## (undated) DEVICE — DRAPE LEGGINGS 8421

## (undated) DEVICE — LINEN TOWEL PACK X5 5464

## (undated) DEVICE — NDL INSUFFLATION 13GA 120MM C2201

## (undated) DEVICE — COVER ULTRASOUND PROBE W/GEL FLEXI-FEEL 6"X58" LF  25-FF658

## (undated) DEVICE — DECANTER BAG 2002S

## (undated) DEVICE — TUBING SUCTION 10'X3/16" N510

## (undated) DEVICE — ESU ELEC BLADE 6" COATED E1450-6

## (undated) DEVICE — SU VICRYL+ 0 54 UNDYED VCP608H

## (undated) DEVICE — DRSG TELFA 3X8" 1238

## (undated) DEVICE — SPONGE LAP 18X18" 23250-400A

## (undated) DEVICE — SYR 50ML LL W/O NDL 309653

## (undated) DEVICE — SU VICRYL 2-0 SH 27" UND J417H

## (undated) DEVICE — LINEN GOWN XLG 5407

## (undated) DEVICE — SU MONOCRYL 3-0 SH 27" UND Y416H

## (undated) DEVICE — SOL NACL 0.9% IRRIG 1000ML BOTTLE 2F7124

## (undated) DEVICE — PACK MINOR SBA15MIFSE

## (undated) DEVICE — ESU GROUND PAD ADULT W/CORD E7507

## (undated) DEVICE — GLOVE BIOGEL PI MICRO SZ 6.5 48565

## (undated) DEVICE — PANTIES MESH LG/XLG 2PK 706M2

## (undated) DEVICE — SYR BULB IRRIG 50ML LATEX FREE 0035280

## (undated) DEVICE — KNIFE HANDLE W/15 BLADE 371615

## (undated) DEVICE — PAD CHUX UNDERPAD 23X24" 7136

## (undated) DEVICE — SU VICRYL 4-0 PS-2 18" UND J496H

## (undated) DEVICE — TUBING SMOKE EVAC 7/8"X6 UNSTERILE LVT-102

## (undated) DEVICE — PROTECTOR ARM ONE-STEP TRENDELENBURG 40418

## (undated) DEVICE — GOWN XLG DISP 9545

## (undated) DEVICE — PREP POVIDONE IODINE USP 10% TOPICAL SOL 64537161

## (undated) DEVICE — SWAB RAYON 8" 1 TIP LG STERILE 34-7022-50

## (undated) DEVICE — PROBE COVER INTRAOPERATIVE 5"X96" PC1308

## (undated) DEVICE — JELLY LUBRICATING SURGILUBE 2OZ TUBE

## (undated) DEVICE — SOL NACL 0.9% IRRIG 3000ML BAG 2B7477

## (undated) DEVICE — TUBE SMOKE EVAC 7/8"X10 (STERILE)

## (undated) DEVICE — ENDO TROCAR FIRST ENTRY KII FIOS Z-THRD 05X100MM CTF03

## (undated) DEVICE — ESU ENDO SCISSORS 5MM CVD 5DCS

## (undated) DEVICE — SOL WATER IRRIG 1000ML BOTTLE 07139-09

## (undated) DEVICE — SU VICRYL+ 0 8-27 CT1/CR UND VCPP4

## (undated) DEVICE — SWAB PROCTO 16" 2/PK 32-046

## (undated) DEVICE — GLOVE PROTEXIS W/NEU-THERA 7.0  2D73TE70

## (undated) DEVICE — TUBING SUCTION MEDI-VAC SOFT 3/16"X20' N520A

## (undated) DEVICE — KIT INTRODUCER FLUENT MICRO 5FRX10CM ECHO TIP KIT-038-04

## (undated) DEVICE — CATH TRAY FOLEY SURESTEP 16FR W/URNE MTR STLK LATEX A303316A

## (undated) DEVICE — SUCTION IRR STRYKERFLOW II W/TIP 250-070-520

## (undated) DEVICE — GLOVE BIOGEL PI MICRO INDICATOR UNDERGLOVE SZ 7.0 48970

## (undated) DEVICE — SU VICRYL 2-0 CTX 36" J369H

## (undated) DEVICE — SYR BULB IRRIG DOVER 60 ML LATEX FREE 67000

## (undated) DEVICE — SOL NACL 0.9% INJ 1000ML BAG 2B1324X

## (undated) DEVICE — PACK CENTRAL LINE INSERTION SAN32CLFCG

## (undated) DEVICE — CONNECTOR MALE TO MALE LL

## (undated) DEVICE — DRAPE SHEET REV FOLD 3/4 9349

## (undated) DEVICE — SU DERMABOND ADVANCED .7ML DNX12

## (undated) DEVICE — LINEN TOWEL PACK X6 WHITE 5487

## (undated) DEVICE — DRSG GAUZE 4X4" TRAY 6939

## (undated) DEVICE — DRAIN JACKSON PRATT RESERVOIR 100ML SU130-1305

## (undated) DEVICE — SU PDS II 0 TP-1 60" Z991G

## (undated) DEVICE — SU PDS II 3-0 SH 27" Z316H

## (undated) DEVICE — GLOVE BIOGEL PI ULTRATOUCH G SZ 8.0 42180

## (undated) DEVICE — TIP CAUTERY L HOOK 36CM E377336C

## (undated) DEVICE — DRAPE BACK TABLE  44X90" 8377

## (undated) DEVICE — SYR 10ML LL W/O NDL 302995

## (undated) DEVICE — SYR PISTON URETHRAL 60ML 68000

## (undated) DEVICE — ESU PENCIL SMOKE EVAC W/ROCKER SWITCH 0703-047-000

## (undated) DEVICE — GLOVE PROTEXIS POWDER FREE SMT 6.5  2D72PT65X

## (undated) DEVICE — TONGUE DEPRESSOR STERILE 6023

## (undated) DEVICE — PAD PERI W/WINGS 1580A

## (undated) DEVICE — PREP POVIDONE IODINE USP 7.5% CLEANING SOL 64538161

## (undated) DEVICE — DRAPE GYN/UROLOGY FLUID POUCH TUR 29455

## (undated) DEVICE — PREP TECHNI-CARE CHLOROXYLENOL 3% 4OZ BOTTLE C222-4ZWO

## (undated) DEVICE — APPLICATOR RAYON TIP 8" SWAB

## (undated) DEVICE — SUCTION TIP POOLE K770

## (undated) DEVICE — SPECIMEN TRAP MUCOUS 40ML LUKI C30200A

## (undated) DEVICE — SU VICRYL 4-0 P-3 18" UND  J494H

## (undated) DEVICE — SU ETHILON 2-0 FS 18" 664H

## (undated) DEVICE — LINEN TOWEL PACK X30 5481

## (undated) DEVICE — ENDO TROCAR SLEEVE KII Z-THREADED 05X100MM CTS02

## (undated) DEVICE — PREP CHLORAPREP 26ML TINTED ORANGE  260815

## (undated) DEVICE — SU WND CLOSURE VLOC 180 ABS 0 9" GS-21 VLOCL0346

## (undated) DEVICE — SU MONOCRYL 4-0 P-3 18" UND Y494G

## (undated) RX ORDER — CEFAZOLIN SODIUM/WATER 2 G/20 ML
SYRINGE (ML) INTRAVENOUS
Status: DISPENSED
Start: 2024-07-12

## (undated) RX ORDER — HYDROXYZINE HYDROCHLORIDE 25 MG/1
TABLET, FILM COATED ORAL
Status: DISPENSED
Start: 2020-03-12

## (undated) RX ORDER — AMPICILLIN 1 G/1
INJECTION, POWDER, FOR SOLUTION INTRAMUSCULAR; INTRAVENOUS
Status: DISPENSED
Start: 2025-01-07

## (undated) RX ORDER — LIDOCAINE HYDROCHLORIDE 10 MG/ML
INJECTION, SOLUTION EPIDURAL; INFILTRATION; INTRACAUDAL; PERINEURAL
Status: DISPENSED
Start: 2024-11-29

## (undated) RX ORDER — BUPIVACAINE HYDROCHLORIDE 2.5 MG/ML
INJECTION, SOLUTION INFILTRATION; PERINEURAL
Status: DISPENSED
Start: 2019-05-24

## (undated) RX ORDER — SILVER SULFADIAZINE 10 MG/G
CREAM TOPICAL
Status: DISPENSED
Start: 2021-06-08

## (undated) RX ORDER — PHENYLEPHRINE HCL IN 0.9% NACL 50MG/250ML
PLASTIC BAG, INJECTION (ML) INTRAVENOUS
Status: DISPENSED
Start: 2024-07-12

## (undated) RX ORDER — FENTANYL CITRATE 50 UG/ML
INJECTION, SOLUTION INTRAMUSCULAR; INTRAVENOUS
Status: DISPENSED
Start: 2020-03-12

## (undated) RX ORDER — PROPOFOL 10 MG/ML
INJECTION, EMULSION INTRAVENOUS
Status: DISPENSED
Start: 2021-06-08

## (undated) RX ORDER — FENTANYL CITRATE 50 UG/ML
INJECTION, SOLUTION INTRAMUSCULAR; INTRAVENOUS
Status: DISPENSED
Start: 2024-07-12

## (undated) RX ORDER — CEFAZOLIN SODIUM 2 G/50ML
SOLUTION INTRAVENOUS
Status: DISPENSED
Start: 2024-08-26

## (undated) RX ORDER — PROPOFOL 10 MG/ML
INJECTION, EMULSION INTRAVENOUS
Status: DISPENSED
Start: 2019-05-24

## (undated) RX ORDER — LIDOCAINE HYDROCHLORIDE 20 MG/ML
SOLUTION OROPHARYNGEAL
Status: DISPENSED
Start: 2025-03-14

## (undated) RX ORDER — SODIUM CHLORIDE, SODIUM LACTATE, POTASSIUM CHLORIDE, CALCIUM CHLORIDE 600; 310; 30; 20 MG/100ML; MG/100ML; MG/100ML; MG/100ML
INJECTION, SOLUTION INTRAVENOUS
Status: DISPENSED
Start: 2024-07-12

## (undated) RX ORDER — EPINEPHRINE 1 MG/ML
INJECTION, SOLUTION INTRAMUSCULAR; SUBCUTANEOUS
Status: DISPENSED
Start: 2019-05-24

## (undated) RX ORDER — HYDROMORPHONE HYDROCHLORIDE 1 MG/ML
INJECTION, SOLUTION INTRAMUSCULAR; INTRAVENOUS; SUBCUTANEOUS
Status: DISPENSED
Start: 2024-07-12

## (undated) RX ORDER — FENTANYL CITRATE 50 UG/ML
INJECTION, SOLUTION INTRAMUSCULAR; INTRAVENOUS
Status: DISPENSED
Start: 2024-11-29

## (undated) RX ORDER — BUPIVACAINE HYDROCHLORIDE AND EPINEPHRINE 5; 5 MG/ML; UG/ML
INJECTION, SOLUTION EPIDURAL; INTRACAUDAL; PERINEURAL
Status: DISPENSED
Start: 2021-06-08

## (undated) RX ORDER — CEFAZOLIN SODIUM 1 G/3ML
INJECTION, POWDER, FOR SOLUTION INTRAMUSCULAR; INTRAVENOUS
Status: DISPENSED
Start: 2024-07-12

## (undated) RX ORDER — ONDANSETRON 2 MG/ML
INJECTION INTRAMUSCULAR; INTRAVENOUS
Status: DISPENSED
Start: 2021-06-08

## (undated) RX ORDER — OXYCODONE HYDROCHLORIDE 5 MG/1
TABLET ORAL
Status: DISPENSED
Start: 2020-03-12

## (undated) RX ORDER — IODINE AND POTASSIUM IODIDE 50; 100 MG/ML; MG/ML
LIQUID ORAL
Status: DISPENSED
Start: 2021-06-08

## (undated) RX ORDER — HEPARIN SODIUM 5000 [USP'U]/.5ML
INJECTION, SOLUTION INTRAVENOUS; SUBCUTANEOUS
Status: DISPENSED
Start: 2024-07-12

## (undated) RX ORDER — LIDOCAINE HYDROCHLORIDE AND EPINEPHRINE 10; 10 MG/ML; UG/ML
INJECTION, SOLUTION INFILTRATION; PERINEURAL
Status: DISPENSED
Start: 2019-05-24

## (undated) RX ORDER — FENTANYL CITRATE-0.9 % NACL/PF 10 MCG/ML
PLASTIC BAG, INJECTION (ML) INTRAVENOUS
Status: DISPENSED
Start: 2021-06-08

## (undated) RX ORDER — FENTANYL CITRATE 50 UG/ML
INJECTION, SOLUTION INTRAMUSCULAR; INTRAVENOUS
Status: DISPENSED
Start: 2021-06-08

## (undated) RX ORDER — FENTANYL CITRATE 50 UG/ML
INJECTION, SOLUTION INTRAMUSCULAR; INTRAVENOUS
Status: DISPENSED
Start: 2024-08-26

## (undated) RX ORDER — ACETAMINOPHEN 325 MG/1
TABLET ORAL
Status: DISPENSED
Start: 2024-05-17

## (undated) RX ORDER — PROPOFOL 10 MG/ML
INJECTION, EMULSION INTRAVENOUS
Status: DISPENSED
Start: 2020-03-12

## (undated) RX ORDER — HYDROMORPHONE HCL IN WATER/PF 6 MG/30 ML
PATIENT CONTROLLED ANALGESIA SYRINGE INTRAVENOUS
Status: DISPENSED
Start: 2024-05-17

## (undated) RX ORDER — ACETAMINOPHEN 325 MG/1
TABLET ORAL
Status: DISPENSED
Start: 2021-06-08

## (undated) RX ORDER — AMPICILLIN 1 G/1
INJECTION, POWDER, FOR SOLUTION INTRAMUSCULAR; INTRAVENOUS
Status: DISPENSED
Start: 2024-12-30

## (undated) RX ORDER — DIPHENHYDRAMINE HYDROCHLORIDE 50 MG/ML
INJECTION INTRAMUSCULAR; INTRAVENOUS
Status: DISPENSED
Start: 2024-08-26

## (undated) RX ORDER — HEPARIN SODIUM 5000 [USP'U]/ML
INJECTION, SOLUTION INTRAVENOUS; SUBCUTANEOUS
Status: DISPENSED
Start: 2019-05-24

## (undated) RX ORDER — LIDOCAINE HYDROCHLORIDE 20 MG/ML
JELLY TOPICAL
Status: DISPENSED
Start: 2021-06-08

## (undated) RX ORDER — ONDANSETRON 2 MG/ML
INJECTION INTRAMUSCULAR; INTRAVENOUS
Status: DISPENSED
Start: 2024-08-26

## (undated) RX ORDER — ONDANSETRON 2 MG/ML
INJECTION INTRAMUSCULAR; INTRAVENOUS
Status: DISPENSED
Start: 2019-05-24

## (undated) RX ORDER — FENTANYL CITRATE 50 UG/ML
INJECTION, SOLUTION INTRAMUSCULAR; INTRAVENOUS
Status: DISPENSED
Start: 2024-05-17

## (undated) RX ORDER — OXYCODONE HYDROCHLORIDE 5 MG/1
TABLET ORAL
Status: DISPENSED
Start: 2024-05-17

## (undated) RX ORDER — ONDANSETRON 2 MG/ML
INJECTION INTRAMUSCULAR; INTRAVENOUS
Status: DISPENSED
Start: 2020-03-12

## (undated) RX ORDER — FENTANYL CITRATE-0.9 % NACL/PF 10 MCG/ML
PLASTIC BAG, INJECTION (ML) INTRAVENOUS
Status: DISPENSED
Start: 2024-07-12

## (undated) RX ORDER — PHENAZOPYRIDINE HYDROCHLORIDE 200 MG/1
TABLET, FILM COATED ORAL
Status: DISPENSED
Start: 2024-05-17

## (undated) RX ORDER — ONDANSETRON 2 MG/ML
INJECTION INTRAMUSCULAR; INTRAVENOUS
Status: DISPENSED
Start: 2024-07-12

## (undated) RX ORDER — HEPARIN SODIUM 5000 [USP'U]/.5ML
INJECTION, SOLUTION INTRAVENOUS; SUBCUTANEOUS
Status: DISPENSED
Start: 2024-05-17

## (undated) RX ORDER — ACETAMINOPHEN 325 MG/1
TABLET ORAL
Status: DISPENSED
Start: 2019-05-24

## (undated) RX ORDER — GLYCOPYRROLATE 0.2 MG/ML
INJECTION, SOLUTION INTRAMUSCULAR; INTRAVENOUS
Status: DISPENSED
Start: 2024-07-12

## (undated) RX ORDER — HEPARIN SODIUM 10000 [USP'U]/ML
INJECTION, SOLUTION INTRAVENOUS; SUBCUTANEOUS
Status: DISPENSED
Start: 2020-03-12

## (undated) RX ORDER — OXYCODONE HYDROCHLORIDE 5 MG/1
TABLET ORAL
Status: DISPENSED
Start: 2019-05-24

## (undated) RX ORDER — HYDROMORPHONE HCL IN WATER/PF 6 MG/30 ML
PATIENT CONTROLLED ANALGESIA SYRINGE INTRAVENOUS
Status: DISPENSED
Start: 2024-07-12

## (undated) RX ORDER — DEXAMETHASONE SODIUM PHOSPHATE 4 MG/ML
INJECTION, SOLUTION INTRA-ARTICULAR; INTRALESIONAL; INTRAMUSCULAR; INTRAVENOUS; SOFT TISSUE
Status: DISPENSED
Start: 2019-05-24

## (undated) RX ORDER — ONDANSETRON 2 MG/ML
INJECTION INTRAMUSCULAR; INTRAVENOUS
Status: DISPENSED
Start: 2024-05-17

## (undated) RX ORDER — CEFAZOLIN SODIUM/WATER 2 G/20 ML
SYRINGE (ML) INTRAVENOUS
Status: DISPENSED
Start: 2024-05-17

## (undated) RX ORDER — EPHEDRINE SULFATE 50 MG/ML
INJECTION, SOLUTION INTRAMUSCULAR; INTRAVENOUS; SUBCUTANEOUS
Status: DISPENSED
Start: 2024-07-12

## (undated) RX ORDER — FENTANYL CITRATE 50 UG/ML
INJECTION, SOLUTION INTRAMUSCULAR; INTRAVENOUS
Status: DISPENSED
Start: 2019-05-24

## (undated) RX ORDER — OXYCODONE HYDROCHLORIDE 5 MG/1
TABLET ORAL
Status: DISPENSED
Start: 2021-06-08

## (undated) RX ORDER — REGADENOSON 0.08 MG/ML
INJECTION, SOLUTION INTRAVENOUS
Status: DISPENSED
Start: 2024-07-10

## (undated) RX ORDER — LIDOCAINE HYDROCHLORIDE 20 MG/ML
SOLUTION OROPHARYNGEAL
Status: DISPENSED
Start: 2025-03-21